# Patient Record
Sex: MALE | Race: WHITE | NOT HISPANIC OR LATINO
[De-identification: names, ages, dates, MRNs, and addresses within clinical notes are randomized per-mention and may not be internally consistent; named-entity substitution may affect disease eponyms.]

---

## 2017-01-19 ENCOUNTER — TRANSCRIPTION ENCOUNTER (OUTPATIENT)
Age: 77
End: 2017-01-19

## 2018-07-31 ENCOUNTER — APPOINTMENT (OUTPATIENT)
Dept: ANESTHESIOLOGY | Facility: CLINIC | Age: 78
End: 2018-07-31

## 2018-07-31 ENCOUNTER — OUTPATIENT (OUTPATIENT)
Dept: OUTPATIENT SERVICES | Facility: HOSPITAL | Age: 78
LOS: 1 days | End: 2018-07-31
Payer: MEDICARE

## 2018-07-31 DIAGNOSIS — M54.17 RADICULOPATHY, LUMBOSACRAL REGION: ICD-10-CM

## 2018-07-31 DIAGNOSIS — Z98.890 OTHER SPECIFIED POSTPROCEDURAL STATES: Chronic | ICD-10-CM

## 2018-07-31 DIAGNOSIS — M54.16 RADICULOPATHY, LUMBAR REGION: ICD-10-CM

## 2018-07-31 DIAGNOSIS — Z90.79 ACQUIRED ABSENCE OF OTHER GENITAL ORGAN(S): Chronic | ICD-10-CM

## 2018-07-31 PROCEDURE — 64484 NJX AA&/STRD TFRM EPI L/S EA: CPT

## 2018-07-31 PROCEDURE — 64483 NJX AA&/STRD TFRM EPI L/S 1: CPT

## 2019-05-13 ENCOUNTER — APPOINTMENT (OUTPATIENT)
Dept: ORTHOPEDIC SURGERY | Facility: CLINIC | Age: 79
End: 2019-05-13
Payer: MEDICARE

## 2019-05-13 VITALS
BODY MASS INDEX: 29.73 KG/M2 | SYSTOLIC BLOOD PRESSURE: 171 MMHG | HEIGHT: 66 IN | HEART RATE: 62 BPM | DIASTOLIC BLOOD PRESSURE: 79 MMHG | WEIGHT: 185 LBS

## 2019-05-13 DIAGNOSIS — Z78.9 OTHER SPECIFIED HEALTH STATUS: ICD-10-CM

## 2019-05-13 PROCEDURE — 99214 OFFICE O/P EST MOD 30 MIN: CPT

## 2019-05-13 NOTE — PHYSICAL EXAM
[de-identified] : The patient is noted to have functional stable range of motion to the lumbar spine with forward flexion at 70°. Negative straight leg raise.\par \par However the patient is noted to have a radicular pain element running down the right leg with associated diminished sensation and weakness to the right lower extremity.\par \par Deep tendon reflexes at the knee on the left side is 2+ and on the right side is 1+. [de-identified] : repeat x-rays of the lumbosacral spine were deferred at this time.

## 2019-05-13 NOTE — ASSESSMENT
[FreeTextEntry1] : Since the patient is having renewed back pain with right-sided radiculopathy as before, he was advised of following up with his pain management specialist for consideration of further spinal epidural actions.\par \par The patient is advised to return to our office in 6-8 weeks to check his progress.

## 2019-05-13 NOTE — HISTORY OF PRESENT ILLNESS
[Pain Location] : pain [___ yrs] : [unfilled] year(s) ago [Stable] : stable [Bending] : worsened by bending [de-identified] : Pt presents with  pain to his left back  lifting heavy object.. Patient is pointing to his  back, took Tylenol extra strength. He has seen Dr Herman for SEIT injections  saw him over a year ago.

## 2020-08-26 ENCOUNTER — INPATIENT (INPATIENT)
Facility: HOSPITAL | Age: 80
LOS: 8 days | Discharge: HOME | DRG: 853 | End: 2020-09-04
Attending: INTERNAL MEDICINE | Admitting: INTERNAL MEDICINE
Payer: MEDICARE

## 2020-08-26 VITALS
DIASTOLIC BLOOD PRESSURE: 63 MMHG | HEIGHT: 70 IN | OXYGEN SATURATION: 99 % | SYSTOLIC BLOOD PRESSURE: 127 MMHG | TEMPERATURE: 99 F | RESPIRATION RATE: 18 BRPM | HEART RATE: 122 BPM | WEIGHT: 220.02 LBS

## 2020-08-26 DIAGNOSIS — N18.3 CHRONIC KIDNEY DISEASE, STAGE 3 (MODERATE): ICD-10-CM

## 2020-08-26 DIAGNOSIS — M79.605 PAIN IN LEFT LEG: ICD-10-CM

## 2020-08-26 DIAGNOSIS — A41.9 SEPSIS, UNSPECIFIED ORGANISM: ICD-10-CM

## 2020-08-26 DIAGNOSIS — M10.9 GOUT, UNSPECIFIED: ICD-10-CM

## 2020-08-26 DIAGNOSIS — D63.8 ANEMIA IN OTHER CHRONIC DISEASES CLASSIFIED ELSEWHERE: ICD-10-CM

## 2020-08-26 DIAGNOSIS — E78.00 PURE HYPERCHOLESTEROLEMIA, UNSPECIFIED: ICD-10-CM

## 2020-08-26 DIAGNOSIS — K80.20 CALCULUS OF GALLBLADDER WITHOUT CHOLECYSTITIS WITHOUT OBSTRUCTION: ICD-10-CM

## 2020-08-26 DIAGNOSIS — I10 ESSENTIAL (PRIMARY) HYPERTENSION: ICD-10-CM

## 2020-08-26 DIAGNOSIS — J18.9 PNEUMONIA, UNSPECIFIED ORGANISM: ICD-10-CM

## 2020-08-26 DIAGNOSIS — Z98.890 OTHER SPECIFIED POSTPROCEDURAL STATES: Chronic | ICD-10-CM

## 2020-08-26 DIAGNOSIS — Z90.79 ACQUIRED ABSENCE OF OTHER GENITAL ORGAN(S): Chronic | ICD-10-CM

## 2020-08-26 LAB
ALBUMIN SERPL ELPH-MCNC: 3.7 G/DL — SIGNIFICANT CHANGE UP (ref 3.3–5)
ALP SERPL-CCNC: 70 U/L — SIGNIFICANT CHANGE UP (ref 40–120)
ALT FLD-CCNC: 53 U/L — HIGH (ref 10–45)
ANION GAP SERPL CALC-SCNC: 15 MMOL/L — SIGNIFICANT CHANGE UP (ref 5–17)
ANISOCYTOSIS BLD QL: SLIGHT — SIGNIFICANT CHANGE UP
APPEARANCE UR: ABNORMAL
APTT BLD: 27 SEC — LOW (ref 27.5–35.5)
AST SERPL-CCNC: 60 U/L — HIGH (ref 10–40)
BACTERIA # UR AUTO: NEGATIVE — SIGNIFICANT CHANGE UP
BASOPHILS # BLD AUTO: 0 K/UL — SIGNIFICANT CHANGE UP (ref 0–0.2)
BASOPHILS NFR BLD AUTO: 0 % — SIGNIFICANT CHANGE UP (ref 0–2)
BILIRUB SERPL-MCNC: 0.8 MG/DL — SIGNIFICANT CHANGE UP (ref 0.2–1.2)
BILIRUB UR-MCNC: NEGATIVE — SIGNIFICANT CHANGE UP
BUN SERPL-MCNC: 44 MG/DL — HIGH (ref 7–23)
CALCIUM SERPL-MCNC: 8.5 MG/DL — SIGNIFICANT CHANGE UP (ref 8.4–10.5)
CHLORIDE SERPL-SCNC: 102 MMOL/L — SIGNIFICANT CHANGE UP (ref 96–108)
CO2 SERPL-SCNC: 19 MMOL/L — LOW (ref 22–31)
COLOR SPEC: YELLOW — SIGNIFICANT CHANGE UP
CREAT SERPL-MCNC: 2.61 MG/DL — HIGH (ref 0.5–1.3)
DACRYOCYTES BLD QL SMEAR: SLIGHT — SIGNIFICANT CHANGE UP
DIFF PNL FLD: ABNORMAL
ELLIPTOCYTES BLD QL SMEAR: SLIGHT — SIGNIFICANT CHANGE UP
EOSINOPHIL # BLD AUTO: 0.23 K/UL — SIGNIFICANT CHANGE UP (ref 0–0.5)
EOSINOPHIL NFR BLD AUTO: 3 % — SIGNIFICANT CHANGE UP (ref 0–6)
EPI CELLS # UR: 5 — SIGNIFICANT CHANGE UP
GLUCOSE SERPL-MCNC: 217 MG/DL — HIGH (ref 70–99)
GLUCOSE UR QL: NEGATIVE — SIGNIFICANT CHANGE UP
HCT VFR BLD CALC: 33.1 % — LOW (ref 39–50)
HGB BLD-MCNC: 11 G/DL — LOW (ref 13–17)
HYALINE CASTS # UR AUTO: 2 /LPF — SIGNIFICANT CHANGE UP (ref 0–7)
INR BLD: 1.47 RATIO — HIGH (ref 0.88–1.16)
KETONES UR-MCNC: NEGATIVE — SIGNIFICANT CHANGE UP
LACTATE BLDV-MCNC: 1.8 MMOL/L — SIGNIFICANT CHANGE UP (ref 0.7–2)
LEUKOCYTE ESTERASE UR-ACNC: NEGATIVE — SIGNIFICANT CHANGE UP
LYMPHOCYTES # BLD AUTO: 0.08 K/UL — LOW (ref 1–3.3)
LYMPHOCYTES # BLD AUTO: 1 % — LOW (ref 13–44)
MACROCYTES BLD QL: SIGNIFICANT CHANGE UP
MANUAL SMEAR VERIFICATION: SIGNIFICANT CHANGE UP
MCHC RBC-ENTMCNC: 33.2 GM/DL — SIGNIFICANT CHANGE UP (ref 32–36)
MCHC RBC-ENTMCNC: 35.9 PG — HIGH (ref 27–34)
MCV RBC AUTO: 108.2 FL — HIGH (ref 80–100)
MICROCYTES BLD QL: SLIGHT — SIGNIFICANT CHANGE UP
MONOCYTES # BLD AUTO: 0.23 K/UL — SIGNIFICANT CHANGE UP (ref 0–0.9)
MONOCYTES NFR BLD AUTO: 3 % — SIGNIFICANT CHANGE UP (ref 2–14)
NEUTROPHILS # BLD AUTO: 7.02 K/UL — SIGNIFICANT CHANGE UP (ref 1.8–7.4)
NEUTROPHILS NFR BLD AUTO: 86 % — HIGH (ref 43–77)
NEUTS BAND # BLD: 4 % — SIGNIFICANT CHANGE UP (ref 0–8)
NITRITE UR-MCNC: NEGATIVE — SIGNIFICANT CHANGE UP
NRBC # BLD: 0 /100 — SIGNIFICANT CHANGE UP (ref 0–0)
PH UR: 6 — SIGNIFICANT CHANGE UP (ref 5–8)
PLAT MORPH BLD: NORMAL — SIGNIFICANT CHANGE UP
PLATELET # BLD AUTO: 118 K/UL — LOW (ref 150–400)
POLYCHROMASIA BLD QL SMEAR: SLIGHT — SIGNIFICANT CHANGE UP
POTASSIUM SERPL-MCNC: 4 MMOL/L — SIGNIFICANT CHANGE UP (ref 3.5–5.3)
POTASSIUM SERPL-SCNC: 4 MMOL/L — SIGNIFICANT CHANGE UP (ref 3.5–5.3)
PROT SERPL-MCNC: 6.6 G/DL — SIGNIFICANT CHANGE UP (ref 6–8.3)
PROT UR-MCNC: ABNORMAL
PROTHROM AB SERPL-ACNC: 17.2 SEC — HIGH (ref 10.6–13.6)
RBC # BLD: 3.06 M/UL — LOW (ref 4.2–5.8)
RBC # FLD: 17.2 % — HIGH (ref 10.3–14.5)
RBC BLD AUTO: ABNORMAL
RBC CASTS # UR COMP ASSIST: 2 /HPF — SIGNIFICANT CHANGE UP (ref 0–4)
SARS-COV-2 RNA SPEC QL NAA+PROBE: SIGNIFICANT CHANGE UP
SODIUM SERPL-SCNC: 136 MMOL/L — SIGNIFICANT CHANGE UP (ref 135–145)
SP GR SPEC: 1.02 — SIGNIFICANT CHANGE UP (ref 1.01–1.02)
UROBILINOGEN FLD QL: NEGATIVE — SIGNIFICANT CHANGE UP
VARIANT LYMPHS # BLD: 3 % — SIGNIFICANT CHANGE UP (ref 0–6)
WBC # BLD: 7.8 K/UL — SIGNIFICANT CHANGE UP (ref 3.8–10.5)
WBC # FLD AUTO: 7.8 K/UL — SIGNIFICANT CHANGE UP (ref 3.8–10.5)
WBC UR QL: 5 /HPF — SIGNIFICANT CHANGE UP (ref 0–5)

## 2020-08-26 PROCEDURE — 93010 ELECTROCARDIOGRAM REPORT: CPT

## 2020-08-26 PROCEDURE — 99285 EMERGENCY DEPT VISIT HI MDM: CPT

## 2020-08-26 PROCEDURE — 74176 CT ABD & PELVIS W/O CONTRAST: CPT | Mod: 26

## 2020-08-26 PROCEDURE — 71045 X-RAY EXAM CHEST 1 VIEW: CPT | Mod: 26

## 2020-08-26 PROCEDURE — 71250 CT THORAX DX C-: CPT | Mod: 26

## 2020-08-26 PROCEDURE — 99222 1ST HOSP IP/OBS MODERATE 55: CPT

## 2020-08-26 RX ORDER — SODIUM CHLORIDE 9 MG/ML
1000 INJECTION INTRAMUSCULAR; INTRAVENOUS; SUBCUTANEOUS ONCE
Refills: 0 | Status: COMPLETED | OUTPATIENT
Start: 2020-08-26 | End: 2020-08-26

## 2020-08-26 RX ORDER — PIPERACILLIN AND TAZOBACTAM 4; .5 G/20ML; G/20ML
3.38 INJECTION, POWDER, LYOPHILIZED, FOR SOLUTION INTRAVENOUS ONCE
Refills: 0 | Status: COMPLETED | OUTPATIENT
Start: 2020-08-26 | End: 2020-08-26

## 2020-08-26 RX ORDER — LOSARTAN POTASSIUM 100 MG/1
100 TABLET, FILM COATED ORAL DAILY
Refills: 0 | Status: DISCONTINUED | OUTPATIENT
Start: 2020-08-26 | End: 2020-08-27

## 2020-08-26 RX ORDER — HEPARIN SODIUM 5000 [USP'U]/ML
5000 INJECTION INTRAVENOUS; SUBCUTANEOUS EVERY 12 HOURS
Refills: 0 | Status: DISCONTINUED | OUTPATIENT
Start: 2020-08-26 | End: 2020-09-04

## 2020-08-26 RX ORDER — AZITHROMYCIN 500 MG/1
500 TABLET, FILM COATED ORAL ONCE
Refills: 0 | Status: COMPLETED | OUTPATIENT
Start: 2020-08-26 | End: 2020-08-26

## 2020-08-26 RX ORDER — VANCOMYCIN HCL 1 G
1000 VIAL (EA) INTRAVENOUS ONCE
Refills: 0 | Status: COMPLETED | OUTPATIENT
Start: 2020-08-26 | End: 2020-08-26

## 2020-08-26 RX ORDER — ATORVASTATIN CALCIUM 80 MG/1
20 TABLET, FILM COATED ORAL AT BEDTIME
Refills: 0 | Status: DISCONTINUED | OUTPATIENT
Start: 2020-08-26 | End: 2020-08-30

## 2020-08-26 RX ORDER — CARVEDILOL PHOSPHATE 80 MG/1
6.25 CAPSULE, EXTENDED RELEASE ORAL EVERY 12 HOURS
Refills: 0 | Status: DISCONTINUED | OUTPATIENT
Start: 2020-08-26 | End: 2020-08-27

## 2020-08-26 RX ORDER — ASPIRIN AND DIPYRIDAMOLE 25; 200 MG/1; MG/1
1 CAPSULE, EXTENDED RELEASE ORAL
Refills: 0 | Status: DISCONTINUED | OUTPATIENT
Start: 2020-08-26 | End: 2020-09-01

## 2020-08-26 RX ORDER — CEFTRIAXONE 500 MG/1
1000 INJECTION, POWDER, FOR SOLUTION INTRAMUSCULAR; INTRAVENOUS ONCE
Refills: 0 | Status: COMPLETED | OUTPATIENT
Start: 2020-08-26 | End: 2020-08-26

## 2020-08-26 RX ORDER — CEFTRIAXONE 500 MG/1
1000 INJECTION, POWDER, FOR SOLUTION INTRAMUSCULAR; INTRAVENOUS EVERY 24 HOURS
Refills: 0 | Status: DISCONTINUED | OUTPATIENT
Start: 2020-08-26 | End: 2020-08-28

## 2020-08-26 RX ORDER — CALCITRIOL 0.5 UG/1
0.25 CAPSULE ORAL DAILY
Refills: 0 | Status: DISCONTINUED | OUTPATIENT
Start: 2020-08-26 | End: 2020-09-04

## 2020-08-26 RX ORDER — AZITHROMYCIN 500 MG/1
TABLET, FILM COATED ORAL
Refills: 0 | Status: DISCONTINUED | OUTPATIENT
Start: 2020-08-26 | End: 2020-08-28

## 2020-08-26 RX ORDER — AZITHROMYCIN 500 MG/1
500 TABLET, FILM COATED ORAL EVERY 24 HOURS
Refills: 0 | Status: DISCONTINUED | OUTPATIENT
Start: 2020-08-27 | End: 2020-08-28

## 2020-08-26 RX ORDER — COLCHICINE 0.6 MG
0.6 TABLET ORAL DAILY
Refills: 0 | Status: DISCONTINUED | OUTPATIENT
Start: 2020-08-26 | End: 2020-08-27

## 2020-08-26 RX ADMIN — AZITHROMYCIN 250 MILLIGRAM(S): 500 TABLET, FILM COATED ORAL at 22:46

## 2020-08-26 RX ADMIN — Medication 250 MILLIGRAM(S): at 20:19

## 2020-08-26 RX ADMIN — PIPERACILLIN AND TAZOBACTAM 200 GRAM(S): 4; .5 INJECTION, POWDER, LYOPHILIZED, FOR SOLUTION INTRAVENOUS at 19:19

## 2020-08-26 RX ADMIN — SODIUM CHLORIDE 1000 MILLILITER(S): 9 INJECTION INTRAMUSCULAR; INTRAVENOUS; SUBCUTANEOUS at 17:57

## 2020-08-26 RX ADMIN — ASPIRIN AND DIPYRIDAMOLE 1 CAPSULE(S): 25; 200 CAPSULE, EXTENDED RELEASE ORAL at 22:30

## 2020-08-26 RX ADMIN — CEFTRIAXONE 100 MILLIGRAM(S): 500 INJECTION, POWDER, FOR SOLUTION INTRAMUSCULAR; INTRAVENOUS at 17:54

## 2020-08-26 RX ADMIN — SODIUM CHLORIDE 1000 MILLILITER(S): 9 INJECTION INTRAMUSCULAR; INTRAVENOUS; SUBCUTANEOUS at 16:54

## 2020-08-26 RX ADMIN — ATORVASTATIN CALCIUM 20 MILLIGRAM(S): 80 TABLET, FILM COATED ORAL at 22:30

## 2020-08-26 NOTE — ED ADULT TRIAGE NOTE - CHIEF COMPLAINT QUOTE
Fever Poss urosepsis  Foul smelling urine Fever Poss urosepsis  Foul smelling urine Frequent urination Bilat leg redness

## 2020-08-26 NOTE — ED PROVIDER NOTE - PMH
Gout    HTN - Hypertension    Hypercholesterolemia    PC (prostate cancer)  s/p surgical resection 3 years ago

## 2020-08-26 NOTE — ED PROVIDER NOTE - OBJECTIVE STATEMENT
80yom pmhx of HTN HLD prostate CA bib ems for weakness and fever for past 2 days. unable to get off the couch since yesterday. No chest pain no sob, no nausea or vomiting. +incontinence of urine; seen by PMD this week for arthritis and started on Tramadol.

## 2020-08-26 NOTE — ED ADULT NURSE NOTE - OBJECTIVE STATEMENT
79 yo male presents to ED via EMS from home for fever, weakness x2 days. Per EMS, patient has had increasing weakness and fever tmax 103 at home for the past day. Upon arrival at home, EMS states patient was found with urine stained clothing. EMS states patient received 975 Tylenol approximately 1 hours prior to arrival. Upon arrival, patient is sweaty, airway patent, breathing spontaneously, bl clear lungs, abdomen nontender, +pulses, cap refill <2 seconds. Patient has small wound to L lower calf. Patient denies SOB, CP, nvd, sick contacts, cough, falls/loc. Patient resting in bed, side rails up, plan of care explained.  Cardiac monitor in place. Code Sepsis initiated. MD Rodriguez at bedside.

## 2020-08-26 NOTE — H&P ADULT - NEGATIVE CARDIOVASCULAR SYMPTOMS
no orthopnea/no peripheral edema/no chest pain/no palpitations/no paroxysmal nocturnal dyspnea/no dyspnea on exertion

## 2020-08-26 NOTE — ED PROVIDER NOTE - PHYSICAL EXAMINATION
Gen: AAO x 3, uncomfortable, unkept   Skin: No rashes or lesions  HEENT: NC/AT, PERRLA, EOMI, MMM  Resp: unlabored CTAB  Cardiac: tachy s1s2   GI: ND, +BS, Soft, NT  Ext: no pedal edema, FROM in all extremities; b/l shin skin changes and mild redness, No warmth.   Neuro: no focal deficits

## 2020-08-26 NOTE — ED PROVIDER NOTE - SHIFT CHANGE DETAILS
Attending MD Segura: Concern for urosepsis, ?renal dysfxn, abx'ed Ceftriaxone, cx, IVFs, will need admission

## 2020-08-26 NOTE — ED PROVIDER NOTE - PROGRESS NOTE DETAILS
Attending MD Segura: Labs noted, CXR noted, no clear etiology, will obtain CTCAP, discussed with Dr. Wiggins, will admit to his service.  No acute pathology on prelim read of CTs.

## 2020-08-26 NOTE — H&P ADULT - HISTORY OF PRESENT ILLNESS
80yom pmhx of HTN HLD prostate CA bib ems for weakness and fever for past 2 days. unable to get off the couch since yesterday. No chest pain no sob, no nausea or vomiting. +incontinence of urine; seen by PMD this week for arthritis and started on Tramadol. 80yom pmhx of HTN , HLD  ,prostate CA , PVD bib ems for weakness and fever for past 2 days. Unable to get off the couch since yesterday. No chest pain no sob, no nausea or vomiting. +incontinence of urine; seen by PMD this week for arthritis and started on Tramadol.

## 2020-08-26 NOTE — ED ADULT NURSE REASSESSMENT NOTE - NS ED NURSE REASSESS COMMENT FT1
Patient straight cathed for sterile urine sample with 2nd RN at bedside. Patient tolerated well, 100cc of dark yellow urine obtained. UA/UC sent to lab.

## 2020-08-26 NOTE — ED PROVIDER NOTE - ATTENDING CONTRIBUTION TO CARE
81 y/o m with pmhx gout, HTN, HLD,  prostate ca, presents for eval of fever. patient arrived by EMS who found him at home sitting on sofa, for the last 24 hrs. daughter called EMS for him concerned of infection. patient denies cough. no urinary symptoms. no  pain. no vomiting no diarrhea. no chest pain. no recent covid exp or hx pos test. HR fast by ems but improved with ivf.    Gen.  elderly male. no acute distress  HEENT:  dry mm  Lungs:  b/l bs  CVS: S1S2   Abd;  soft non tender no distention  Ext: no erythema  Neuro: aaox3 clear speech  MSK: moving all ext spon

## 2020-08-26 NOTE — ED ADULT NURSE NOTE - NSIMPLEMENTINTERV_GEN_ALL_ED
Implemented All Fall Risk Interventions:  [place] to call system. Call bell, personal items and telephone within reach. Instruct patient to call for assistance. Room bathroom lighting operational. Non-slip footwear when patient is off stretcher. Physically safe environment: no spills, clutter or unnecessary equipment. Stretcher in lowest position, wheels locked, appropriate side rails in place. Provide visual cue, wrist band, yellow gown, etc. Monitor gait and stability. Monitor for mental status changes and reorient to person, place, and time. Review medications for side effects contributing to fall risk. Reinforce activity limits and safety measures with patient and family.

## 2020-08-26 NOTE — H&P ADULT - ASSESSMENT
80yom pmhx of HTN HLD prostate CA bib ems for weakness and fever for past 2 days. unable to get off the couch since yesterday. No chest pain no sob, no nausea or vomiting. +incontinence of urine; seen by PMD this week for arthritis and started on Tramadol. 80yom pmhx of HTN HLD ,PVD prostate CA bib ems for weakness and fever for past 2 days. unable to get off the couch since yesterday. No chest pain no sob, no nausea or vomiting. +incontinence of urine; seen by PMD this week for arthritis and started on Tramadol.

## 2020-08-26 NOTE — H&P ADULT - NSICDXPASTMEDICALHX_GEN_ALL_CORE_FT
PAST MEDICAL HISTORY:  Gout     HTN - Hypertension     Hypercholesterolemia     PC (prostate cancer) s/p surgical resection 3 years ago

## 2020-08-26 NOTE — ED PROVIDER NOTE - NS ED ROS FT
Constitutional: +fever +chills  Eyes: No visual changes, eye pain or redness  HEENT: No throat pain, ear pain, nasal pain. No nose bleeding.  CV: No chest pain or lower extremity edema  Resp: No SOB no cough  GI: No abd pain. No nausea or vomiting. No diarrhea. No constipation.   : No dysuria, hematuria.   MSK: No musculoskeletal pain +weakness   Skin: No rash  Neuro: No headache. No numbness or tingling. No weakness.

## 2020-08-26 NOTE — ED PROVIDER NOTE - CLINICAL SUMMARY MEDICAL DECISION MAKING FREE TEXT BOX
ATTG: : fever concerns include pna / uti. check labs, check urinalysis, check xray chest, re eval for dispo

## 2020-08-26 NOTE — ED ADULT NURSE REASSESSMENT NOTE - NS ED NURSE REASSESS COMMENT FT1
Report received from PARIS Connell in purple. Pt AxOx3, observed sitting up in stretcher conversing with RN without difficulty. Breathing spontaneous and unlabored with pulse ox >95% on room air. Pt updated on plan of care awaiting bed assignment. Upon assessment, abdomen soft and nontender, +strong peripheral pulses, moving all extremities without difficulty, lungs clear, following commands with equal strength in all extremities. Pt repositioned and provided clean linen for comfort. No acute distress noted. Call bell within reach.

## 2020-08-27 DIAGNOSIS — I70.219 ATHEROSCLEROSIS OF NATIVE ARTERIES OF EXTREMITIES WITH INTERMITTENT CLAUDICATION, UNSPECIFIED EXTREMITY: ICD-10-CM

## 2020-08-27 DIAGNOSIS — I73.9 PERIPHERAL VASCULAR DISEASE, UNSPECIFIED: ICD-10-CM

## 2020-08-27 DIAGNOSIS — N18.9 CHRONIC KIDNEY DISEASE, UNSPECIFIED: ICD-10-CM

## 2020-08-27 LAB
ALBUMIN SERPL ELPH-MCNC: 3.4 G/DL — SIGNIFICANT CHANGE UP (ref 3.3–5)
ALP SERPL-CCNC: 68 U/L — SIGNIFICANT CHANGE UP (ref 40–120)
ALT FLD-CCNC: 63 U/L — HIGH (ref 10–45)
ANION GAP SERPL CALC-SCNC: 15 MMOL/L — SIGNIFICANT CHANGE UP (ref 5–17)
AST SERPL-CCNC: 64 U/L — HIGH (ref 10–40)
BILIRUB SERPL-MCNC: 0.7 MG/DL — SIGNIFICANT CHANGE UP (ref 0.2–1.2)
BUN SERPL-MCNC: 47 MG/DL — HIGH (ref 7–23)
CALCIUM SERPL-MCNC: 8.3 MG/DL — LOW (ref 8.4–10.5)
CHLORIDE SERPL-SCNC: 102 MMOL/L — SIGNIFICANT CHANGE UP (ref 96–108)
CO2 SERPL-SCNC: 19 MMOL/L — LOW (ref 22–31)
CREAT SERPL-MCNC: 3 MG/DL — HIGH (ref 0.5–1.3)
CULTURE RESULTS: NO GROWTH — SIGNIFICANT CHANGE UP
GLUCOSE SERPL-MCNC: 120 MG/DL — HIGH (ref 70–99)
HCT VFR BLD CALC: 32.6 % — LOW (ref 39–50)
HGB BLD-MCNC: 10.8 G/DL — LOW (ref 13–17)
MCHC RBC-ENTMCNC: 33.1 GM/DL — SIGNIFICANT CHANGE UP (ref 32–36)
MCHC RBC-ENTMCNC: 36.1 PG — HIGH (ref 27–34)
MCV RBC AUTO: 109 FL — HIGH (ref 80–100)
NRBC # BLD: 0 /100 WBCS — SIGNIFICANT CHANGE UP (ref 0–0)
PLATELET # BLD AUTO: 106 K/UL — LOW (ref 150–400)
POTASSIUM SERPL-MCNC: 4 MMOL/L — SIGNIFICANT CHANGE UP (ref 3.5–5.3)
POTASSIUM SERPL-SCNC: 4 MMOL/L — SIGNIFICANT CHANGE UP (ref 3.5–5.3)
PROT SERPL-MCNC: 6.3 G/DL — SIGNIFICANT CHANGE UP (ref 6–8.3)
RBC # BLD: 2.99 M/UL — LOW (ref 4.2–5.8)
RBC # FLD: 17.5 % — HIGH (ref 10.3–14.5)
SODIUM SERPL-SCNC: 136 MMOL/L — SIGNIFICANT CHANGE UP (ref 135–145)
SPECIMEN SOURCE: SIGNIFICANT CHANGE UP
WBC # BLD: 5.92 K/UL — SIGNIFICANT CHANGE UP (ref 3.8–10.5)
WBC # FLD AUTO: 5.92 K/UL — SIGNIFICANT CHANGE UP (ref 3.8–10.5)

## 2020-08-27 PROCEDURE — 93971 EXTREMITY STUDY: CPT | Mod: 26

## 2020-08-27 PROCEDURE — 99232 SBSQ HOSP IP/OBS MODERATE 35: CPT

## 2020-08-27 PROCEDURE — 93923 UPR/LXTR ART STDY 3+ LVLS: CPT | Mod: 26

## 2020-08-27 PROCEDURE — 99223 1ST HOSP IP/OBS HIGH 75: CPT

## 2020-08-27 RX ORDER — SODIUM CHLORIDE 9 MG/ML
1000 INJECTION, SOLUTION INTRAVENOUS
Refills: 0 | Status: DISCONTINUED | OUTPATIENT
Start: 2020-08-27 | End: 2020-08-28

## 2020-08-27 RX ORDER — COLCHICINE 0.6 MG
0.3 TABLET ORAL DAILY
Refills: 0 | Status: DISCONTINUED | OUTPATIENT
Start: 2020-08-27 | End: 2020-09-04

## 2020-08-27 RX ORDER — ACETAMINOPHEN 500 MG
650 TABLET ORAL EVERY 6 HOURS
Refills: 0 | Status: DISCONTINUED | OUTPATIENT
Start: 2020-08-27 | End: 2020-09-04

## 2020-08-27 RX ORDER — ACETAMINOPHEN 500 MG
1000 TABLET ORAL ONCE
Refills: 0 | Status: COMPLETED | OUTPATIENT
Start: 2020-08-27 | End: 2020-08-27

## 2020-08-27 RX ORDER — CARVEDILOL PHOSPHATE 80 MG/1
6.25 CAPSULE, EXTENDED RELEASE ORAL EVERY 12 HOURS
Refills: 0 | Status: DISCONTINUED | OUTPATIENT
Start: 2020-08-27 | End: 2020-08-28

## 2020-08-27 RX ADMIN — ATORVASTATIN CALCIUM 20 MILLIGRAM(S): 80 TABLET, FILM COATED ORAL at 21:28

## 2020-08-27 RX ADMIN — Medication 650 MILLIGRAM(S): at 17:35

## 2020-08-27 RX ADMIN — SODIUM CHLORIDE 50 MILLILITER(S): 9 INJECTION, SOLUTION INTRAVENOUS at 18:02

## 2020-08-27 RX ADMIN — LOSARTAN POTASSIUM 100 MILLIGRAM(S): 100 TABLET, FILM COATED ORAL at 05:13

## 2020-08-27 RX ADMIN — CARVEDILOL PHOSPHATE 6.25 MILLIGRAM(S): 80 CAPSULE, EXTENDED RELEASE ORAL at 17:11

## 2020-08-27 RX ADMIN — Medication 0.3 MILLIGRAM(S): at 11:47

## 2020-08-27 RX ADMIN — CARVEDILOL PHOSPHATE 6.25 MILLIGRAM(S): 80 CAPSULE, EXTENDED RELEASE ORAL at 05:13

## 2020-08-27 RX ADMIN — HEPARIN SODIUM 5000 UNIT(S): 5000 INJECTION INTRAVENOUS; SUBCUTANEOUS at 17:12

## 2020-08-27 RX ADMIN — CEFTRIAXONE 100 MILLIGRAM(S): 500 INJECTION, POWDER, FOR SOLUTION INTRAMUSCULAR; INTRAVENOUS at 16:58

## 2020-08-27 RX ADMIN — ASPIRIN AND DIPYRIDAMOLE 1 CAPSULE(S): 25; 200 CAPSULE, EXTENDED RELEASE ORAL at 17:12

## 2020-08-27 RX ADMIN — ASPIRIN AND DIPYRIDAMOLE 1 CAPSULE(S): 25; 200 CAPSULE, EXTENDED RELEASE ORAL at 05:13

## 2020-08-27 RX ADMIN — Medication 650 MILLIGRAM(S): at 17:11

## 2020-08-27 RX ADMIN — CALCITRIOL 0.25 MICROGRAM(S): 0.5 CAPSULE ORAL at 11:48

## 2020-08-27 RX ADMIN — Medication 400 MILLIGRAM(S): at 05:12

## 2020-08-27 RX ADMIN — HEPARIN SODIUM 5000 UNIT(S): 5000 INJECTION INTRAVENOUS; SUBCUTANEOUS at 05:13

## 2020-08-27 RX ADMIN — AZITHROMYCIN 250 MILLIGRAM(S): 500 TABLET, FILM COATED ORAL at 21:28

## 2020-08-27 NOTE — CONSULT NOTE ADULT - ASSESSMENT
80 year old with history of prostate ca, UTIs, prostatectomy, presented with weakness, leg pain which seems to have resolved, not walking well, and currently complains only of left flank pain  Denies SOB, fever, chills     CT scan reviewed  suggested right lower pna but clinically presentation is not really suggestive of pna.  He has left flank pain but UA is only modestly abnormal and ct scan does not demonstrate obstruction.     It is not clear what is the problem ( pyelo, pna, or PVD).    check legionella ag.  change ceftriaxone to cefepime to cover gram negatives in urine better.   dc Zithromax    await urine and bc.  attempt to walk ; no weakness on direct exam but he has not walked   ID to cover next 3 days . 80 year old with history of prostate ca, UTIs, prostatectomy, presented with weakness, leg pain which seems to have resolved, not walking well, and currently complains only of left flank pain  Denies SOB, fever, chills     CT scan reviewed  suggested right lower pna but clinically presentation is not really suggestive of pna.  He has left flank pain but UA is only modestly abnormal and ct scan does not demonstrate obstruction.     It is not clear what is the problem ( pyelo, pna, or PVD).    check legionella ag.  change ceftriaxone to cefepime to cover gram negatives in urine better.   dc Zithromax if legionella is negative     await urine and bc.  attempt to walk ; no weakness on direct exam but he has not walked   ID to cover next 3 days .

## 2020-08-27 NOTE — CONSULT NOTE ADULT - PROBLEM SELECTOR RECOMMENDATION 2
-Vascular f/u
I Agus Alarcon MD performed a history and physical exam of the patient and discussed  the findings and plan with the house officer. I reviewed the resident note and agree with the findings and plan

## 2020-08-27 NOTE — CONSULT NOTE ADULT - ASSESSMENT
81 y/o M with PMH of HTN, HLD, prostate CA, PVD. Presents to ED with weakness, L leg pain and fever for past 2 days. Unable to get off the couch since yesterday. Seen by PMD this week for arthritis and started on Tramadol. Found to have LLL consolidation concerning for PNA.

## 2020-08-27 NOTE — PROGRESS NOTE ADULT - SUBJECTIVE AND OBJECTIVE BOX
INTERVAL HPI/OVERNIGHT EVENTS: I feel much better.   Vital Signs Last 24 Hrs  T(C): 36.8 (27 Aug 2020 11:15), Max: 39.3 (27 Aug 2020 04:59)  T(F): 98.2 (27 Aug 2020 11:15), Max: 102.8 (27 Aug 2020 04:59)  HR: 82 (27 Aug 2020 11:15) (74 - 122)  BP: 107/63 (27 Aug 2020 11:15) (95/57 - 147/77)  BP(mean): 92 (26 Aug 2020 19:28) (87 - 92)  RR: 16 (27 Aug 2020 11:15) (16 - 23)  SpO2: 95% (27 Aug 2020 11:15) (91% - 99%)  I&O's Summary    26 Aug 2020 07:  -  27 Aug 2020 07:00  --------------------------------------------------------  IN: 340 mL / OUT: 200 mL / NET: 140 mL    27 Aug 2020 07:01  -  27 Aug 2020 13:43  --------------------------------------------------------  IN: 0 mL / OUT: 120 mL / NET: -120 mL      MEDICATIONS  (STANDING):  atorvastatin 20 milliGRAM(s) Oral at bedtime  azithromycin  IVPB      azithromycin  IVPB 500 milliGRAM(s) IV Intermittent every 24 hours  calcitriol   Capsule 0.25 MICROGram(s) Oral daily  carvedilol 6.25 milliGRAM(s) Oral every 12 hours  cefTRIAXone   IVPB 1000 milliGRAM(s) IV Intermittent every 24 hours  colchicine 0.3 milliGRAM(s) Oral daily  dipyridamole 200 mG/aspirin 25 mG 1 Capsule(s) Oral two times a day  heparin   Injectable 5000 Unit(s) SubCutaneous every 12 hours    MEDICATIONS  (PRN):    LABS:                        10.8   5.92  )-----------( 106      ( 27 Aug 2020 07:09 )             32.6     08    136  |  102  |  47<H>  ----------------------------<  120<H>  4.0   |  19<L>  |  3.00<H>    Ca    8.3<L>      27 Aug 2020 07:06    TPro  6.3  /  Alb  3.4  /  TBili  0.7  /  DBili  x   /  AST  64<H>  /  ALT  63<H>  /  AlkPhos  68      PT/INR - ( 26 Aug 2020 17:22 )   PT: 17.2 sec;   INR: 1.47 ratio         PTT - ( 26 Aug 2020 17:22 )  PTT:27.0 sec  Urinalysis Basic - ( 26 Aug 2020 17:22 )    Color: Yellow / Appearance: Slightly Turbid / S.018 / pH: x  Gluc: x / Ketone: Negative  / Bili: Negative / Urobili: Negative   Blood: x / Protein: 300 mg/dL / Nitrite: Negative   Leuk Esterase: Negative / RBC: 2 /hpf / WBC 5 /HPF   Sq Epi: x / Non Sq Epi: 5 / Bacteria: Negative      CAPILLARY BLOOD GLUCOSE            Urinalysis Basic - ( 26 Aug 2020 17:22 )    Color: Yellow / Appearance: Slightly Turbid / S.018 / pH: x  Gluc: x / Ketone: Negative  / Bili: Negative / Urobili: Negative   Blood: x / Protein: 300 mg/dL / Nitrite: Negative   Leuk Esterase: Negative / RBC: 2 /hpf / WBC 5 /HPF   Sq Epi: x / Non Sq Epi: 5 / Bacteria: Negative      REVIEW OF SYSTEMS:  CONSTITUTIONAL: No fever, weight loss, or fatigue  EYES: No eye pain, visual disturbances, or discharge  RESPIRATORY: No cough, wheezing, chills or hemoptysis; No shortness of breath  CARDIOVASCULAR: No chest pain, palpitations, dizziness, or leg swelling  GASTROINTESTINAL: No abdominal or epigastric pain. No nausea, vomiting, or hematemesis; No diarrhea or constipation. No melena or hematochezia.  GENITOURINARY: No dysuria, frequency, hematuria, or incontinence  NEUROLOGICAL: No headaches, memory loss, loss of strength, numbness, or tremors      Consultant(s) Notes Reviewed:  [x ] YES  [ ] NO    PHYSICAL EXAM:  GENERAL: NAD, well-groomed, well-developed ,not in any distress ,  HEAD:  Atraumatic, Normocephalic  EYES: EOMI, PERRLA, conjunctiva and sclera clear  ENMT: No tonsillar erythema, exudates, or enlargement; Moist mucous membranes, Good dentition, No lesions  NECK: Supple, No JVD, Normal thyroid  NERVOUS SYSTEM:  Alert & Oriented X3, No focal deficit   CHEST/LUNG: Good air entry bilateral with no  rales, rhonchi, wheezing, or rubs  HEART: Regular rate and rhythm; No murmurs, rubs, or gallops  ABDOMEN: Soft, Nontender, Nondistended; Bowel sounds present  EXTREMITIES: No clubbing, cyanosis, or edema  SKIN: No rashes or lesions    Care Discussed with Consultants/Other Providers [ x] YES  [ ] NO

## 2020-08-27 NOTE — PROGRESS NOTE ADULT - SUBJECTIVE AND OBJECTIVE BOX
Vascular Progress Note    S: Patient seen and examined. No acute events overnight.  O:  Physical Exam:  Gen: Laying in bed, NAD  HEENT: atrumatic, EMOI  Resp: Unlabored breathing  Abd: soft, NT,ND, no rebound or guarding.   Vascular: Left DP/PT palpable pulses. Warm to palpation. No wounds appreciated.       Vital Signs Last 24 Hrs  T(C): 36.8 (27 Aug 2020 11:15), Max: 39.3 (27 Aug 2020 04:59)  T(F): 98.2 (27 Aug 2020 11:15), Max: 102.8 (27 Aug 2020 04:59)  HR: 82 (27 Aug 2020 11:15) (74 - 122)  BP: 107/63 (27 Aug 2020 11:15) (95/57 - 147/77)  BP(mean): 92 (26 Aug 2020 19:28) (87 - 92)  RR: 16 (27 Aug 2020 11:15) (16 - 23)  SpO2: 95% (27 Aug 2020 11:15) (91% - 99%)    I&O's Detail    26 Aug 2020 07:01  -  27 Aug 2020 07:00  --------------------------------------------------------  IN:    IV PiggyBack: 100 mL    Oral Fluid: 240 mL  Total IN: 340 mL    OUT:    Voided: 200 mL  Total OUT: 200 mL    Total NET: 140 mL      27 Aug 2020 07:01  -  27 Aug 2020 12:33  --------------------------------------------------------  IN:  Total IN: 0 mL    OUT:    Voided: 120 mL  Total OUT: 120 mL    Total NET: -120 mL                                10.8   5.92  )-----------( 106      ( 27 Aug 2020 07:09 )             32.6       08-27    136  |  102  |  47<H>  ----------------------------<  120<H>  4.0   |  19<L>  |  3.00<H>    Ca    8.3<L>      27 Aug 2020 07:06    TPro  6.3  /  Alb  3.4  /  TBili  0.7  /  DBili  x   /  AST  64<H>  /  ALT  63<H>  /  AlkPhos  68  08-27 Vascular Progress Note    S: Patient seen and examined. No acute events overnight.  O:  Physical Exam:  Gen: Laying in bed, NAD  HEENT: atrumatic, EMOI  Resp: Unlabored breathing  Abd: soft, NT,ND, no rebound or guarding.   Vascular: raghu le warm w good perfusion  no acute changes       Vital Signs Last 24 Hrs  T(C): 36.8 (27 Aug 2020 11:15), Max: 39.3 (27 Aug 2020 04:59)  T(F): 98.2 (27 Aug 2020 11:15), Max: 102.8 (27 Aug 2020 04:59)  HR: 82 (27 Aug 2020 11:15) (74 - 122)  BP: 107/63 (27 Aug 2020 11:15) (95/57 - 147/77)  BP(mean): 92 (26 Aug 2020 19:28) (87 - 92)  RR: 16 (27 Aug 2020 11:15) (16 - 23)  SpO2: 95% (27 Aug 2020 11:15) (91% - 99%)    I&O's Detail    26 Aug 2020 07:01  -  27 Aug 2020 07:00  --------------------------------------------------------  IN:    IV PiggyBack: 100 mL    Oral Fluid: 240 mL  Total IN: 340 mL    OUT:    Voided: 200 mL  Total OUT: 200 mL    Total NET: 140 mL      27 Aug 2020 07:01  -  27 Aug 2020 12:33  --------------------------------------------------------  IN:  Total IN: 0 mL    OUT:    Voided: 120 mL  Total OUT: 120 mL    Total NET: -120 mL                            10.8   5.92  )-----------( 106      ( 27 Aug 2020 07:09 )             32.6       08-27    136  |  102  |  47<H>  ----------------------------<  120<H>  4.0   |  19<L>  |  3.00<H>    Ca    8.3<L>      27 Aug 2020 07:06    TPro  6.3  /  Alb  3.4  /  TBili  0.7  /  DBili  x   /  AST  64<H>  /  ALT  63<H>  /  AlkPhos  68  08-27

## 2020-08-27 NOTE — CHART NOTE - NSCHARTNOTEFT_GEN_A_CORE
CC: Fever    HPI: Asked by RN to evaluate patient for fever with oral temperature of . Patient seen and examined at the bedside. Denies rigors, diaphoresis, headaches, dizziness, syncope, visual changes, chest pain, cough, palpitations, SOB, abdominal pain, N/V/D.     Vital Signs Last 24 Hrs  T(C): 39.3 (27 Aug 2020 04:59), Max: 39.3 (27 Aug 2020 04:59)  T(F): 102.8 (27 Aug 2020 04:59), Max: 102.8 (27 Aug 2020 04:59)  HR: 98 (27 Aug 2020 04:59) (84 - 122)  BP: 139/75 (27 Aug 2020 04:59) (117/91 - 147/77)  BP(mean): 92 (26 Aug 2020 19:28) (87 - 92)  RR: 19 (27 Aug 2020 04:59) (18 - 23)  SpO2: 94% (27 Aug 2020 04:59) (94% - 99%)    PHYSICAL EXAM:  General: NAD, A&Ox3  Respiratory: Lungs clear to auscultation bilaterally. No wheezes, rales, rhonchi. Normal respiratory effort.   Cardiovascular: S1, S2 present. Regular rate and rhythm. No murmurs, rubs, or gallops  Gastrointestinal: BS x4 normoactive. Soft, non-tender, non-distended.   Extremities: 2+ peripheral pulses. No edema, cyanosis.    LABS:                        11.0   7.80  )-----------( 118      ( 26 Aug 2020 17:22 )             33.1     08-26    136  |  102  |  44<H>  ----------------------------<  217<H>  4.0   |  19<L>  |  2.61<H>    Ca    8.5      26 Aug 2020 17:22    TPro  6.6  /  Alb  3.7  /  TBili  0.8  /  DBili  x   /  AST  60<H>  /  ALT  53<H>  /  AlkPhos  70  08-26      A/P  HPI:  80yom pmhx of HTN , HLD  ,prostate CA , PVD bib ems for weakness and fever for past 2 days. Unable to get off the couch since yesterday. No chest pain no sob, no nausea or vomiting. +incontinence of urine; seen by PMD this week for arthritis and started on Tramadol. (26 Aug 2020 19:28)    Now, patient with neutropenic fever with an oral temperature of . Patient is currently asymptomatic.     #Fever  -Repeat vitals were stable  -Blood cultures (/): No growth to date   -CXR (/): Clear lungs   -Continue antipyretics/cooling measures    -Continue current antimicrobials  -Continue IVF for hydration  -Discussed with RN  -Will continue to monitor  -Will endorse to AM team      Erica Baer PA-C  #[id number] CC: Fever    HPI: Asked by RN to evaluate patient for fever with oral temperature of 102.8. Patient seen and examined at the bedside. Denies rigors, diaphoresis, headaches, dizziness, syncope, visual changes, chest pain, cough, palpitations, SOB, abdominal pain, N/V/D.     Vital Signs Last 24 Hrs  T(C): 39.3 (27 Aug 2020 04:59), Max: 39.3 (27 Aug 2020 04:59)  T(F): 102.8 (27 Aug 2020 04:59), Max: 102.8 (27 Aug 2020 04:59)  HR: 98 (27 Aug 2020 04:59) (84 - 122)  BP: 139/75 (27 Aug 2020 04:59) (117/91 - 147/77)  BP(mean): 92 (26 Aug 2020 19:28) (87 - 92)  RR: 19 (27 Aug 2020 04:59) (18 - 23)  SpO2: 94% (27 Aug 2020 04:59) (94% - 99%)    PHYSICAL EXAM:  General: NAD, A&Ox3  Respiratory: Lungs clear to auscultation bilaterally. No wheezes, rales, rhonchi. Normal respiratory effort.   Cardiovascular: S1, S2 present. Regular rate and rhythm. No murmurs, rubs, or gallops  Gastrointestinal: BS x4 normoactive. Soft, non-tender, non-distended.   Extremities: 2+ peripheral pulses. No edema, cyanosis.    LABS:                        11.0   7.80  )-----------( 118      ( 26 Aug 2020 17:22 )             33.1     08-26    136  |  102  |  44<H>  ----------------------------<  217<H>  4.0   |  19<L>  |  2.61<H>    Ca    8.5      26 Aug 2020 17:22    TPro  6.6  /  Alb  3.7  /  TBili  0.8  /  DBili  x   /  AST  60<H>  /  ALT  53<H>  /  AlkPhos  70  08-26    RADIOLOGY:  < from: CT Chest, Abdomen, Pelvis No Cont (08.26.20 @ 19:07) >    Left lower lobe pneumonia.    No acute intra-abdominal pathology.    Extensive atherosclerotic changes within the intra-abdominal vasculature.    Diminutive right kidney.    Cholelithiasis.    Follow-up official report in a.m.    < end of copied text >      A/P  81 y/o M with PMHx of HTN, HLD, PVD, CKD III, prostate CA presents with weakness and fever x 2 days.   Now, patient with fever with an oral temperature of 102.8. Patient is currently asymptomatic.     #Fever likely 2/2 LLL PNA  -Repeat vitals were stable  -F/u BCx, UCx  -Pre-howe CT C/A/P (8/26/2020): LLL PNA. Extensive atherosclerotic changes within the intra-abdominal vasculature. Cholelithiasis   -IV Tylenol 1gm x 1 STAT given   -Ice packs given  -Continue current antimicrobials (IV zithro and ceftriaxone)  -Discussed with RN  -Will continue to monitor  -Will endorse to AM team      KOJO ConnerC  #[id number]

## 2020-08-27 NOTE — PROVIDER CONTACT NOTE (OTHER) - ASSESSMENT
Patient manual BP 92/63. Patient asymptomatic. Other VSS. Denies Dizziness, nausea, vomiting, chest pain. Patient alert and oriented x4.

## 2020-08-27 NOTE — CONSULT NOTE ADULT - SUBJECTIVE AND OBJECTIVE BOX
HPI:  Mr. Moya is a 80 year-old man with history of multiple medical issues including hypertension, gout, peripheral vascular disease, and prostate cancer s/p resection, who presented last night to the [facility] ER with weakness and fever for 2 days, as well as urinary incontinence. He was started on Tramadol this past week by his PCP for arthritis.      PAST MEDICAL & SURGICAL HISTORY:  Gout  PVD  PC (prostate cancer): s/p surgical resection 3 years ago  Hypercholesterolemia  HTN - Hypertension  Arthritis  H/O carotid endarterectomy    Allergies  No Known Allergies    SOCIAL HISTORY:  Denies ETOh,Smoking,     FAMILY HISTORY:  No pertinent family history in first degree relatives    REVIEW OF SYSTEMS:  CONSTITUTIONAL: (+)weakness, (+)fever  EYES/ENT: No visual changes;  No vertigo or throat pain   NECK: No pain or stiffness  RESPIRATORY: No cough, wheezing, hemoptysis; No shortness of breath  CARDIOVASCULAR: No chest pain or palpitations  GASTROINTESTINAL: No abdominal or epigastric pain. No nausea, vomiting, or hematemesis; No diarrhea or constipation. No melena or hematochezia.  GENITOURINARY: No dysuria, frequency or hematuria  NEUROLOGICAL: No numbness; (+)arthritis  SKIN: No itching, burning, rashes, or lesions   All other review of systems is negative unless indicated above.    VITAL:  T(C): , Max: 39.3 (20 @ 04:59)  T(F): , Max: 102.8 (20 @ 04:59)  HR: 74 (20 @ 08:22)  BP: 95/57 (20 @ 08:22)  BP(mean): 92 (20 @ 19:28)  RR: 18 (20 @ 08:22)  SpO2: 91% (20 @ 08:22)    PHYSICAL EXAM:  Constitutional: NAD, Alert  HEENT: NCAT, MMM  Neck: Supple, No JVD  Respiratory: CTA-b/l  Cardiovascular: RRR s1s2, no m/r/g  Gastrointestinal: BS+, soft, NT/ND  Extremities: No peripheral edema b/l  Neurological: no focal deficits; strength grossly intact  Back: no CVAT b/l  Skin: No rashes, no nevi    LABS:                        10.8   5.92  )-----------( 106      ( 27 Aug 2020 07:09 )             32.6     Na(136)/K(4.0)/Cl(102)/HCO3(19)/BUN(47)/Cr(3.00)Glu(120)/Ca(8.3)/Mg(--)/PO4(--)     @ 07:06  Na(136)/K(4.0)/Cl(102)/HCO3(19)/BUN(44)/Cr(2.61)Glu(217)/Ca(8.5)/Mg(--)/PO4(--)     @ 17:22    Urinalysis Basic - ( 26 Aug 2020 17:22 )  Color: Yellow / Appearance: Slightly Turbid / S.018 / pH: x  Gluc: x / Ketone: Negative  / Bili: Negative / Urobili: Negative   Blood: x / Protein: 300 mg/dL / Nitrite: Negative   Leuk Esterase: Negative / RBC: 2 /hpf / WBC 5 /HPF   Sq Epi: x / Non Sq Epi: 5 / Bacteria: Negative      (2016)- BUN 40, Cr 1.82        IMAGING:  < from: CT Abdomen and Pelvis No Cont (20 @ 19:07) >  KIDNEYS/URETERS: Atrophic right kidney. Left renal cyst.  BLADDER: Contains a focus of air. Correlate for recent instrumentation.  IMPRESSION:  Lower lobe consolidation concerning for pneumonia.      ASSESSMENT:  (1)Renal - CKD stage 3-4; given how high his creatinine was back in 2016, there is reason to expect that his baseline would have further risen over the past 4 years - a creatinine in the mid-2s may very well be his baseline. Atrophic right kidney; no hydronephrosis of the left kidney based on imaging from yesterday. Notable proteinuria; likely chronic glomerulonephritis; unclear etiology. Creatinine up today relative to yesterday evening - likely hemodynamically mediated in association with his tenuous hemodynamics    (2)Metabolic acidosis - likely primarily renally mediated    (3)Fever/weakness - presumed due to pneumonia    RECOMMEND:  (1)D/C Losartan for now  (2)Meds for GFR 15ml/min:        (a)Reduce Colchicine from 0.6mg qd to 0.3mg po qd   (3)Gentle IVF - 1/2NS+75meq/L NaHCO3, 50cc/h x 1L  (4)BMP+Mg+PO4 daily  (5)Urine: protein, creatinine  (6)Hold Coreg for SBP<100      Thank you for involving [facility] in this patient's care.    With warm regards,    Edis Cabral MD   [facility]  Office: [phone]  Cell: [phone] HPI:  Mr. Moya is a 80 year-old man with history of multiple medical issues including hypertension, gout, peripheral vascular disease, and prostate cancer s/p resection, who presented last night to the [facility] ER with weakness and fever for 2 days, as well as urinary incontinence. He was started on Tramadol this past week by his PCP for arthritis.    Mr. Moya tells me that he has CKD, and that he follows with nephrologist Dr. Mares in [place]; he has been followed by Dr. Mares for approximately 1 year. He does not know the cause of his CKD. He does not use NSAIDs. He tells me that he has been advised in the past to limit potassium intake. He denies any notable urinary changes over the past few days.    PAST MEDICAL & SURGICAL HISTORY:  Gout  PVD  CKD  PC (prostate cancer): s/p surgical resection 3 years ago  Hypercholesterolemia  HTN - Hypertension  Arthritis  H/O carotid endarterectomy    Allergies  No Known Allergies    SOCIAL HISTORY:  Denies ETOh,Smoking,     FAMILY HISTORY:  No pertinent family history in first degree relatives    REVIEW OF SYSTEMS:  CONSTITUTIONAL: (+)weakness, (+)fever  EYES/ENT: No visual changes;  No vertigo or throat pain   NECK: No pain or stiffness  RESPIRATORY: No cough, wheezing, hemoptysis; No shortness of breath  CARDIOVASCULAR: No chest pain or palpitations  GASTROINTESTINAL: No abdominal or epigastric pain. No nausea, vomiting, or hematemesis; No diarrhea or constipation. No melena or hematochezia.  GENITOURINARY: No dysuria, frequency or hematuria  NEUROLOGICAL: No numbness; (+)arthritis  SKIN: No itching, burning, rashes, or lesions   All other review of systems is negative unless indicated above.    VITAL:  T(C): , Max: 39.3 (20 @ 04:59)  T(F): , Max: 102.8 (20 @ 04:59)  HR: 74 (20 @ 08:22)  BP: 95/57 (20 @ 08:22)  BP(mean): 92 (20 @ 19:28)  RR: 18 (20 @ 08:22)  SpO2: 91% (20 @ 08:22)    PHYSICAL EXAM:  Constitutional: NAD, Alert  HEENT: NCAT, DMM  Neck: Supple, No JVD  Respiratory: CTA-b/l  Cardiovascular: RRR s1s2, no m/r/g  Gastrointestinal: BS+, soft, NT/ND  Extremities: No peripheral edema b/l  Neurological: no focal deficits; strength grossly intact  Back: no CVAT b/l  Skin: No rashes, no nevi    LABS:                        10.8   5.92  )-----------( 106      ( 27 Aug 2020 07:09 )             32.6     Na(136)/K(4.0)/Cl(102)/HCO3(19)/BUN(47)/Cr(3.00)Glu(120)/Ca(8.3)/Mg(--)/PO4(--)     @ 07:06  Na(136)/K(4.0)/Cl(102)/HCO3(19)/BUN(44)/Cr(2.61)Glu(217)/Ca(8.5)/Mg(--)/PO4(--)     @ 17:22    Urinalysis Basic - ( 26 Aug 2020 17:22 )  Color: Yellow / Appearance: Slightly Turbid / S.018 / pH: x  Gluc: x / Ketone: Negative  / Bili: Negative / Urobili: Negative   Blood: x / Protein: 300 mg/dL / Nitrite: Negative   Leuk Esterase: Negative / RBC: 2 /hpf / WBC 5 /HPF   Sq Epi: x / Non Sq Epi: 5 / Bacteria: Negative      (2016)- BUN 40, Cr 1.82        IMAGING:  < from: CT Abdomen and Pelvis No Cont (20 @ 19:07) >  KIDNEYS/URETERS: Atrophic right kidney. Left renal cyst.  BLADDER: Contains a focus of air. Correlate for recent instrumentation.  IMPRESSION:  Lower lobe consolidation concerning for pneumonia.      ASSESSMENT:  (1)Renal - CKD stage 3-4; given how high his creatinine was back in 2016, there is reason to expect that his baseline would have further risen over the past 4 years - a creatinine in the mid-2s may very well be his baseline. Atrophic right kidney; no hydronephrosis of the left kidney based on imaging from yesterday. Notable proteinuria; likely chronic glomerulonephritis; unclear etiology. Creatinine up today relative to yesterday evening - likely hemodynamically mediated in association with his tenuous hemodynamics    (2)Metabolic acidosis - likely primarily renally mediated    (3)Fever/weakness - presumed due to pneumonia    RECOMMEND:  (1)D/C Losartan for now  (2)Meds for GFR 15ml/min:        (a)Reduce Colchicine from 0.6mg qd to 0.3mg po qd   (3)Gentle IVF - 1/2NS+75meq/L NaHCO3, 50cc/h x 1L  (4)BMP+Mg+PO4 daily  (5)Urine: protein, creatinine  (6)Hold Coreg for SBP<100      Thank you for involving [facility] in this patient's care.    With warm regards,    Edis Cabral MD   [facility]  Office: [phone]  Cell: [phone]

## 2020-08-27 NOTE — CONSULT NOTE ADULT - PROBLEM SELECTOR RECOMMENDATION 9
LLL consolidation on CT chest   -He is asymptomatic from a respiratory standpoint (no cough, dyspnea)  -Tmax 102.8 F oral overnight  -F/u blood cultures  -F/u urine legionella  -Abx as per ID LLL consolidation on CT chest   -He is asymptomatic from a respiratory standpoint (no cough, dyspnea) but has clear infiltrate LLL that corresponds to LLL rales.  -Tmax 102.8 F oral overnight  -F/u blood cultures  -F/u urine legionella  -Abx as per ID

## 2020-08-27 NOTE — CONSULT NOTE ADULT - SUBJECTIVE AND OBJECTIVE BOX
Patient is a 80y old  Male who presents with a chief complaint of weakness and fever (27 Aug 2020 01:08)    HPI:  80yom pmhx of HTN , HLD  ,prostate CA , PVD bib ems for weakness and fever for past 2 days. Unable to get off the couch since yesterday. No chest pain no sob, no nausea or vomiting. +incontinence of urine; seen by PMD this week for arthritis and started on Tramadol. (26 Aug 2020 19:28)      PAST MEDICAL & SURGICAL HISTORY:  Gout  PC (prostate cancer): s/p surgical resection 3 years ago  Hypercholesterolemia  HTN - Hypertension  H/O carotid endarterectomy  H/O prostatectomy      Social history:    FAMILY HISTORY:  No pertinent family history in first degree relatives            Allergic/Immunologic:	No hives or rash   Allergies    No Known Allergies    Intolerances        Antimicrobials:    azithromycin  IVPB      azithromycin  IVPB 500 milliGRAM(s) IV Intermittent every 24 hours  cefTRIAXone   IVPB 1000 milliGRAM(s) IV Intermittent every 24 hours        Vital Signs Last 24 Hrs  T(C): 36.9 (27 Aug 2020 08:22), Max: 39.3 (27 Aug 2020 04:59)  T(F): 98.5 (27 Aug 2020 08:22), Max: 102.8 (27 Aug 2020 04:59)  HR: 74 (27 Aug 2020 08:22) (74 - 122)  BP: 139/75 (27 Aug 2020 04:59) (117/91 - 147/77)  BP(mean): 92 (26 Aug 2020 19:28) (87 - 92)  RR: 18 (27 Aug 2020 08:22) (18 - 23)  SpO2: 91% (27 Aug 2020 08:22) (91% - 99%)         Eyes:PERRL EOMI.NO discharge or conjunctival injection    ENMT:No sinus tenderness.No thrush.No pharyngeal exudate or erythema.Fair dental hygiene    Neck:Supple,No LN,no JVD      Respiratory:Good air entry bilaterally,CTA    Cardiovascular:S1 S2 wnl, No murmurs,rub or gallops    Gastrointestinal:Soft BS(+) no tenderness no masses ,No rebound or guarding    Genitourinary:  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     Rectal:    Extremities:No cyanosis,clubbing or edema.                                          10.8   5.92  )-----------( 106      ( 27 Aug 2020 07:09 )             32.6         08-27    136  |  102  |  47<H>  ----------------------------<  120<H>  4.0   |  19<L>  |  3.00<H>    Ca    8.3<L>      27 Aug 2020 07:06    TPro  6.3  /  Alb  3.4  /  TBili  0.7  /  DBili  x   /  AST  64<H>  /  ALT  63<H>  /  AlkPhos  68  08-27      RECENT CULTURES:      MICROBIOLOGY:          Radiology:      Assessment:        Recommendations and Plan:    Pager [phone]  After 5 pm/weekends or if no response :[phone]

## 2020-08-27 NOTE — CONSULT NOTE ADULT - SUBJECTIVE AND OBJECTIVE BOX
VASCULAR SURGERY CONSULT NOTE  --------------------------------------------------------------------------------------------    HPI:   Patient is a 80y old  Male who presents with a chief complaint of weakness and fever (26 Aug 2020 19:28)    HPI:  Mr. Moya is a 81yo M pmhx of HTN , HLD, TIA/possible stroke in  s/p carotid endarterectomy, past inferior MI ,prostate CA s/p prostatectomy, OA of hips and legs, CKD3, gout, presented with inability to "move his legs". Patient states that pain is worse on the L than R, worsens with movement and is alleviated when still. Has seen outside vascular surgeon last year for evaluation --no procedures done then. Patient denies any history of claudication of the lower extremities.       PAST MEDICAL & SURGICAL HISTORY:  Gout  PC (prostate cancer): s/p surgical resection 3 years ago  Hypercholesterolemia  HTN - Hypertension  H/O carotid endarterectomy  H/O prostatectomy    FAMILY HISTORY:  No pertinent family history in first degree relatives    [] Family history not pertinent as reviewed with the patient and family    SOCIAL HISTORY:  ***    ALLERGIES: No Known Allergies      HOME MEDICATIONS:  ***    CURRENT MEDICATIONS  MEDICATIONS (STANDING): atorvastatin 20 milliGRAM(s) Oral at bedtime  azithromycin  IVPB      azithromycin  IVPB 500 milliGRAM(s) IV Intermittent every 24 hours  calcitriol   Capsule 0.25 MICROGram(s) Oral daily  carvedilol 6.25 milliGRAM(s) Oral every 12 hours  cefTRIAXone   IVPB 1000 milliGRAM(s) IV Intermittent every 24 hours  colchicine 0.6 milliGRAM(s) Oral daily  dipyridamole 200 mG/aspirin 25 mG 1 Capsule(s) Oral two times a day  heparin   Injectable 5000 Unit(s) SubCutaneous every 12 hours  losartan 100 milliGRAM(s) Oral daily    MEDICATIONS (PRN):  --------------------------------------------------------------------------------------------    Vitals:   T(C): 37.2 (20 @ 00:27), Max: 37.4 (20 @ 16:30)  HR: 94 (20 @ 00:27) (84 - 122)  BP: 145/80 (20 @ 00:27) (117/91 - 147/77)  RR: 20 (20 @ 00:27) (18 - 23)  SpO2: 96% (20 @ 00:27) (95% - 99%)  CAPILLARY BLOOD GLUCOSE       @ 07:01  -   @ 01:09  --------------------------------------------------------  IN:  Total IN: 0 mL    OUT:    Voided: 200 mL  Total OUT: 200 mL    Total NET: -200 mL        Height (cm): 177.8 ( @ 16:30)  Weight (kg): 99.8 ( @ 16:30)  BMI (kg/m2): 31.6 ( 16:30)  BSA (m2): 2.17 ( @ 16:30)    PHYSICAL EXAM:  General: NAD, Lying in bed comfortably  Neuro: A+Ox3  Cardio: RRR, nml S1/S2  Resp: Good effort  Vascular: All 4 extremities warm, B/l radial pulses palpable, AVF in UE with palpable thrill, b/l Femoral pulse palpable,  b/l DP/PT not palpable, + doppler signals at b/l DP/PT  Skin: b/l anterior shin pink, scaly patches, scab on anterior chin of L leg, no wounds on the feet or toes  Musculoskeletal: Lower extremity ROM decreased b/l L>R. No pain on palpation. Pain on movement of the legs L>R generally, focal point of pain behind the L knee at the upper calf with passive motion, no pain on palpation  --------------------------------------------------------------------------------------------    LABS  CBC (:22)                              11.0<L>                         7.80    )----------------(  118<L>     86.0<H>% Neutrophils, 1.0<L>% Lymphocytes, ANC: 7.02                                33.1<L>    BMP (:)             136     |  102     |  44<H> 		Ca++ --      Ca 8.5                ---------------------------------( 217<H>		Mg --                 4.0     |  19<L>   |  2.61<H>			Ph --        LFTs (:22)      TPro 6.6 / Alb 3.7 / TBili 0.8 / DBili -- / AST 60<H> / ALT 53<H> / AlkPhos 70    Coags ( 17:22)  aPTT 27.0<L> / INR 1.47<H> / PT 17.2<H>    Cardiac Markers (:22)     HSTrop: -- / CKMB: -- / CK: 322      VBG (:22)     -- / -- / -- / -- / -- / --%     Lactate: 1.8    --------------------------------------------------------------------------------------------    MICROBIOLOGY  Urinalysis ( 17:22):     Color: Yellow / Appearance: Slightly Turbid<!> / S.018 / pH: 6.0 / Gluc: Negative / Ketones: Negative / Bili: Negative / Urobili: Negative / Protein :300 mg/dL<!> / Nitrites: Negative / Leuk.Est: Negative / RBC: 2 / WBC: 5 / Sq Epi:  / Non Sq Epi: 5 / Bacteria Negative         --------------------------------------------------------------------------------------------    IMAGING VASCULAR SURGERY CONSULT NOTE  --------------------------------------------------------------------------------------------    HPI:   Patient is a 80y old  Male who presents with a chief complaint of weakness and fever (26 Aug 2020 19:28)    HPI:  Mr. Moya is a 81yo M pmhx of HTN , HLD, TIA/possible stroke in 2012 s/p carotid endarterectomy, past inferior MI ,prostate CA s/p prostatectomy, OA of hips and legs, CKD3, gout, presented with inability to "move his legs". Patient states that pain is worse on the L than R, worsens with movement and is alleviated when still. However, he describes the pain as more of an arthritic than claudication. Has seen outside vascular surgeon last year for evaluation --no procedures indicated/performed at that time. Patient denies any history of claudication of the lower extremities.       PAST MEDICAL & SURGICAL HISTORY:  Gout  PC (prostate cancer): s/p surgical resection 3 years ago  Hypercholesterolemia  HTN - Hypertension  H/O carotid endarterectomy  H/O prostatectomy    FAMILY HISTORY:  No pertinent family history in first degree relatives    [x] Family history not pertinent as reviewed with the patient and family    SOCIAL HISTORY:  Former smoker    ALLERGIES: No Known Allergies      CURRENT MEDICATIONS  MEDICATIONS (STANDING): atorvastatin 20 milliGRAM(s) Oral at bedtime  azithromycin  IVPB      azithromycin  IVPB 500 milliGRAM(s) IV Intermittent every 24 hours  calcitriol   Capsule 0.25 MICROGram(s) Oral daily  carvedilol 6.25 milliGRAM(s) Oral every 12 hours  cefTRIAXone   IVPB 1000 milliGRAM(s) IV Intermittent every 24 hours  colchicine 0.6 milliGRAM(s) Oral daily  dipyridamole 200 mG/aspirin 25 mG 1 Capsule(s) Oral two times a day  heparin   Injectable 5000 Unit(s) SubCutaneous every 12 hours  losartan 100 milliGRAM(s) Oral daily    MEDICATIONS (PRN):  --------------------------------------------------------------------------------------------    Vitals:   T(C): 37.2 (20 @ 00:27), Max: 37.4 (20 @ 16:30)  HR: 94 (20 @ 00:27) (84 - 122)  BP: 145/80 (20 @ 00:27) (117/91 - 147/77)  RR: 20 (20 @ 00:27) (18 - 23)  SpO2: 96% (20 @ 00:27) (95% - 99%)  CAPILLARY BLOOD GLUCOSE       @ 07:01  -   @ 01:09  --------------------------------------------------------  IN:  Total IN: 0 mL    OUT:    Voided: 200 mL  Total OUT: 200 mL    Total NET: -200 mL        Height (cm): 177.8 ( @ 16:30)  Weight (kg): 99.8 ( @ 16:30)  BMI (kg/m2): 31.6 ( @ 16:30)  BSA (m2): 2.17 ( 16:30)    PHYSICAL EXAM:  General: NAD, Lying in bed comfortably  Neuro: A+Ox3  Cardio: RRR, nml S1/S2  Resp: Good effort  Vascular: All 4 extremities warm, B/l radial pulses palpable, AVF in UE with palpable thrill, b/l Femoral pulse palpable,  b/l DP/PT not palpable, + doppler signals at b/l DP/PT  Skin: b/l anterior shin pink, scaly patches, scab on anterior chin of L leg, no wounds on the feet or toes  Musculoskeletal: Lower extremity ROM decreased b/l L>R. No pain on palpation. Pain on movement of the legs L>R generally, focal point of pain behind the L knee at the upper calf with passive motion, no pain on palpation  --------------------------------------------------------------------------------------------    LABS  CBC (:)                              11.0<L>                         7.80    )----------------(  118<L>     86.0<H>% Neutrophils, 1.0<L>% Lymphocytes, ANC: 7.02                                33.1<L>    BMP (:)             136     |  102     |  44<H> 		Ca++ --      Ca 8.5                ---------------------------------( 217<H>		Mg --                 4.0     |  19<L>   |  2.61<H>			Ph --        LFTs (:)      TPro 6.6 / Alb 3.7 / TBili 0.8 / DBili -- / AST 60<H> / ALT 53<H> / AlkPhos 70    Coags (:22)  aPTT 27.0<L> / INR 1.47<H> / PT 17.2<H>    Cardiac Markers (:22)     HSTrop: -- / CKMB: -- / CK: 322      VBG (:22)     -- / -- / -- / -- / -- / --%     Lactate: 1.8    --------------------------------------------------------------------------------------------    MICROBIOLOGY  Urinalysis ( @ 17:22):     Color: Yellow / Appearance: Slightly Turbid<!> / S.018 / pH: 6.0 / Gluc: Negative / Ketones: Negative / Bili: Negative / Urobili: Negative / Protein :300 mg/dL<!> / Nitrites: Negative / Leuk.Est: Negative / RBC: 2 / WBC: 5 / Sq Epi:  / Non Sq Epi: 5 / Bacteria Negative         --------------------------------------------------------------------------------------------    IMAGING    No relevant imaging VASCULAR SURGERY CONSULT NOTE  --------------------------------------------------------------------------------------------    HPI:   Patient is a 80y old  Male who presents with a chief complaint of weakness and fever (26 Aug 2020 19:28)    HPI:  Mr. Moya is a 81yo M pmhx of HTN , HLD, TIA/possible stroke in 2012 s/p carotid endarterectomy, past inferior MI ,prostate CA s/p prostatectomy, OA of hips and legs, CKD3, gout, presented with inability to "move his legs". Patient states that pain is worse on the L than R, worsens with movement and is alleviated when still. However, he describes the pain as more of an arthritic than claudication. Has seen outside vascular surgeon last year for evaluation --no procedures indicated/performed at that time. Patient denies any history of claudication of the lower extremities.       PAST MEDICAL & SURGICAL HISTORY:  Gout  PC (prostate cancer): s/p surgical resection 3 years ago  Hypercholesterolemia  HTN - Hypertension  H/O carotid endarterectomy  H/O prostatectomy    FAMILY HISTORY:  No pertinent family history in first degree relatives    [x] Family history not pertinent as reviewed with the patient and family    SOCIAL HISTORY:  Former smoker    ALLERGIES: No Known Allergies      CURRENT MEDICATIONS  MEDICATIONS (STANDING): atorvastatin 20 milliGRAM(s) Oral at bedtime  azithromycin  IVPB      azithromycin  IVPB 500 milliGRAM(s) IV Intermittent every 24 hours  calcitriol   Capsule 0.25 MICROGram(s) Oral daily  carvedilol 6.25 milliGRAM(s) Oral every 12 hours  cefTRIAXone   IVPB 1000 milliGRAM(s) IV Intermittent every 24 hours  colchicine 0.6 milliGRAM(s) Oral daily  dipyridamole 200 mG/aspirin 25 mG 1 Capsule(s) Oral two times a day  heparin   Injectable 5000 Unit(s) SubCutaneous every 12 hours  losartan 100 milliGRAM(s) Oral daily    MEDICATIONS (PRN):  --------------------------------------------------------------------------------------------    Vitals:   T(C): 37.2 (20 @ 00:27), Max: 37.4 (20 @ 16:30)  HR: 94 (20 @ 00:27) (84 - 122)  BP: 145/80 (20 @ 00:27) (117/91 - 147/77)  RR: 20 (20 @ 00:27) (18 - 23)  SpO2: 96% (20 @ 00:27) (95% - 99%)  CAPILLARY BLOOD GLUCOSE       @ 07:01  -   @ 01:09  --------------------------------------------------------  IN:  Total IN: 0 mL    OUT:    Voided: 200 mL  Total OUT: 200 mL    Total NET: -200 mL        Height (cm): 177.8 ( @ 16:30)  Weight (kg): 99.8 ( @ 16:30)  BMI (kg/m2): 31.6 ( 16:30)  BSA (m2): 2.17 ( 16:30)    PHYSICAL EXAM:  General: NAD, Lying in bed comfortably  Neuro: A+Ox3  Cardio: RRR, nml S1/S2  Resp: Good effort  Vascular: All 4 extremities warm, B/l radial pulses palpable, b/l Femoral pulse palpable,  b/l DP/PT not palpable, + doppler signals at b/l DP/PT  Skin: b/l anterior shin pink, scaly patches, scab on anterior chin of L leg, no wounds on the feet or toes  Musculoskeletal: Lower extremity ROM decreased b/l L>R. No pain on palpation. Pain on movement of the legs L>R generally, focal point of pain behind the L knee at the upper calf with passive motion, no pain on palpation  --------------------------------------------------------------------------------------------    LABS  CBC (:22)                              11.0<L>                         7.80    )----------------(  118<L>     86.0<H>% Neutrophils, 1.0<L>% Lymphocytes, ANC: 7.02                                33.1<L>    BMP (:)             136     |  102     |  44<H> 		Ca++ --      Ca 8.5                ---------------------------------( 217<H>		Mg --                 4.0     |  19<L>   |  2.61<H>			Ph --        LFTs (:22)      TPro 6.6 / Alb 3.7 / TBili 0.8 / DBili -- / AST 60<H> / ALT 53<H> / AlkPhos 70    Coags (:22)  aPTT 27.0<L> / INR 1.47<H> / PT 17.2<H>    Cardiac Markers (:22)     HSTrop: -- / CKMB: -- / CK: 322      VBG (:22)     -- / -- / -- / -- / -- / --%     Lactate: 1.8    --------------------------------------------------------------------------------------------    MICROBIOLOGY  Urinalysis (08-26 @ 17:22):     Color: Yellow / Appearance: Slightly Turbid<!> / S.018 / pH: 6.0 / Gluc: Negative / Ketones: Negative / Bili: Negative / Urobili: Negative / Protein :300 mg/dL<!> / Nitrites: Negative / Leuk.Est: Negative / RBC: 2 / WBC: 5 / Sq Epi:  / Non Sq Epi: 5 / Bacteria Negative         --------------------------------------------------------------------------------------------    IMAGING    No relevant imaging VASCULAR SURGERY CONSULT NOTE  --------------------------------------------------------------------------------------------    HPI:   Patient is a 80y old  Male who presents with a chief complaint of weakness and fever (26 Aug 2020 19:28)    HPI:  Mr. Moya is a 79yo M pmhx of HTN , HLD, TIA/possible stroke in 2012 s/p carotid endarterectomy, past inferior MI ,prostate CA s/p prostatectomy, OA of hips and legs, CKD3, gout, presented with inability to "move his legs". Patient states that pain is worse on the L than R, worsens with movement and is alleviated when still. However, he describes the pain as more of an arthritic than claudication. Has seen outside vascular surgeon last year for evaluation --no procedures indicated/performed at that time.   pt denies nocturnal leg and foot cramsp  pt c/o of raghu le  1/2 block intermittent claudication , and states that 12 mo ago he was able to ambulate pain free  at lest 5-6 blocks     PAST MEDICAL & SURGICAL HISTORY:  Gout  PC (prostate cancer): s/p surgical resection 3 years ago  Hypercholesterolemia  HTN - Hypertension  H/O carotid endarterectomy  H/O prostatectomy    FAMILY HISTORY:  No pertinent family history in first degree relatives    [x] Family history not pertinent as reviewed with the patient and family    SOCIAL HISTORY:  Former smoker    ALLERGIES: No Known Allergies      CURRENT MEDICATIONS  MEDICATIONS (STANDING): atorvastatin 20 milliGRAM(s) Oral at bedtime  azithromycin  IVPB      azithromycin  IVPB 500 milliGRAM(s) IV Intermittent every 24 hours  calcitriol   Capsule 0.25 MICROGram(s) Oral daily  carvedilol 6.25 milliGRAM(s) Oral every 12 hours  cefTRIAXone   IVPB 1000 milliGRAM(s) IV Intermittent every 24 hours  colchicine 0.6 milliGRAM(s) Oral daily  dipyridamole 200 mG/aspirin 25 mG 1 Capsule(s) Oral two times a day  heparin   Injectable 5000 Unit(s) SubCutaneous every 12 hours  losartan 100 milliGRAM(s) Oral daily    MEDICATIONS (PRN):  --------------------------------------------------------------------------------------------    Vitals:   T(C): 37.2 (20 @ 00:27), Max: 37.4 (20 @ 16:30)  HR: 94 (20 @ 00:27) (84 - 122)  BP: 145/80 (20 @ 00:27) (117/91 - 147/77)  RR: 20 (20 @ 00:27) (18 - 23)  SpO2: 96% (20 @ 00:27) (95% - 99%)  CAPILLARY BLOOD GLUCOSE       @ 07:01  -   01:09  --------------------------------------------------------  IN:  Total IN: 0 mL    OUT:    Voided: 200 mL  Total OUT: 200 mL    Total NET: -200 mL        Height (cm): 177.8 ( @ 16:30)  Weight (kg): 99.8 ( 16:30)  BMI (kg/m2): 31.6 ( 16:30)  BSA (m2): 2.17 ( 16:30)    PHYSICAL EXAM:  General: NAD, Lying in bed comfortably  Neuro: A+Ox3  Cardio: RRR, nml S1/S2  Resp: Good effort  Vascular: All 4 extremities warm, B/l radial pulses palpable, b/l Femoral pulse palpable,  b/l DP/PT not palpable, + doppler signals at b/l DP/PT  Skin: b/l anterior shin pink, scaly patches, scab on anterior chin of L leg, no wounds on the feet or toes  Musculoskeletal: Lower extremity ROM decreased b/l L>R. No pain on palpation. Pain on movement of the legs L>R generally, focal point of pain behind the L knee at the upper calf with passive motion, no pain on palpation  --------------------------------------------------------------------------------------------    LABS  CBC (:22)                              11.0<L>                         7.80    )----------------(  118<L>     86.0<H>% Neutrophils, 1.0<L>% Lymphocytes, ANC: 7.02                                33.1<L>    BMP ( 17:22)             136     |  102     |  44<H> 		Ca++ --      Ca 8.5                ---------------------------------( 217<H>		Mg --                 4.0     |  19<L>   |  2.61<H>			Ph --        LFTs (:22)      TPro 6.6 / Alb 3.7 / TBili 0.8 / DBili -- / AST 60<H> / ALT 53<H> / AlkPhos 70    Coags (:22)  aPTT 27.0<L> / INR 1.47<H> / PT 17.2<H>    Cardiac Markers (:22)     HSTrop: -- / CKMB: -- / CK: 322      VBG (:22)     -- / -- / -- / -- / -- / --%     Lactate: 1.8    --------------------------------------------------------------------------------------------    MICROBIOLOGY  Urinalysis ( @ 17:22):     Color: Yellow / Appearance: Slightly Turbid<!> / S.018 / pH: 6.0 / Gluc: Negative / Ketones: Negative / Bili: Negative / Urobili: Negative / Protein :300 mg/dL<!> / Nitrites: Negative / Leuk.Est: Negative / RBC: 2 / WBC: 5 / Sq Epi:  / Non Sq Epi: 5 / Bacteria Negative         --------------------------------------------------------------------------------------------    IMAGING    No relevant imaging

## 2020-08-27 NOTE — CONSULT NOTE ADULT - ASSESSMENT
ASSESSMENT: Patient is a 80y old m with ***    PLAN:   - CTAP done --official read in AM  - CHER/PVR  - r/o DVT with LLE doppler    - to be discussed with Vascular in AM ASSESSMENT: Pt is a 79yo M pmhx of HTN, HLD, TIA/possible stroke in 2012 s/p carotid endarterectomy, past inferior MI ,prostate CA s/p prostatectomy, OA of hips and legs, CKD3, gout, presented with LLE pain and difficulty ambulating. Given vasculopathy, consider PVD, however, clinical characteristics of pain     PLAN:   - CTAP done --official read in AM  - CHER/PVR  - r/o DVT with LLE doppler    - to be discussed with Vascular in AM ASSESSMENT: Pt is a 79yo M pmhx of HTN, HLD, TIA/possible stroke in 2012 s/p carotid endarterectomy, past inferior MI ,prostate CA s/p prostatectomy, OA of hips and legs, CKD3, gout, presented with LLE pain and difficulty ambulating. Given vasculopathy, consider PVD vs other etiology given the nature of his pain.     PLAN:   - CTAP done --official read in AM  - CHER/PVR  - r/o DVT with LLE doppler    - to be discussed with Vascular in AM ASSESSMENT: Pt is a 81yo M pmhx of HTN, HLD, TIA/possible stroke in 2012 s/p carotid endarterectomy, past inferior MI ,prostate CA s/p prostatectomy, OA of hips and legs, CKD3, gout, presented with LLE pain and difficulty ambulating. Given vasculopathy, consider PVD vs other etiology given the nature of his pain.     PLAN:   - CTAP done --official read in AM  - CHER/PVR    - to be discussed with Dr. Alarcon    Vascular Surgery #[id number] ASSESSMENT: Pt is a 81yo M pmhx of HTN, HLD, TIA/possible stroke in 2012 s/p carotid endarterectomy, past inferior MI ,prostate CA s/p prostatectomy, OA of hips and legs, CKD3, gout, presented with LLE pain and difficulty ambulating.       PLAN:   - CTAP done --official read in AM  - CHER/PVR    - to be discussed with Dr. Alarcon    Vascular Surgery #[id number]

## 2020-08-27 NOTE — CONSULT NOTE ADULT - SUBJECTIVE AND OBJECTIVE BOX
PULMONARY CONSULT    HPI: 81 y/o M with PMH of HTN, HLD, prostate CA, PVD. Presents to ED with weakness, L leg pain and fever for past 2 days. Unable to get off the couch since yesterday. Seen by PMD this week for arthritis and started on Tramadol. Found to have LLL consolidation concerning for PNA. C/o L flank pain. Denies SOB, cough, CP, pleuritic CP.    PAST MEDICAL & SURGICAL HISTORY:  Gout  PC (prostate cancer): s/p surgical resection 3 years ago  Hypercholesterolemia  HTN - Hypertension  H/O carotid endarterectomy  H/O prostatectomy    Allergies  No Known Allergies    FAMILY HISTORY:  No pertinent family history in first degree relatives    Social history: former smoker - quit about 54 years ago    Review of Systems:  CONSTITUTIONAL: No fever, chills, or fatigue  EYES: No eye pain, visual disturbances, or discharge  ENMT:  No difficulty hearing, tinnitus, vertigo; No sinus or throat pain  NECK: No pain or stiffness  RESPIRATORY: Per above  CARDIOVASCULAR: No chest pain, palpitations, dizziness, or leg swelling  GASTROINTESTINAL: No abdominal or epigastric pain. No nausea, vomiting, or hematemesis; No diarrhea or constipation. No melena or hematochezia.  GENITOURINARY: No dysuria, frequency, hematuria, or incontinence  NEUROLOGICAL: No headaches, memory loss, loss of strength, numbness, or tremors  SKIN: No itching, burning, rashes, or lesions   MUSCULOSKELETAL: No joint pain or swelling; No muscle, back, or extremity pain  PSYCHIATRIC: No depression, anxiety, mood swings, or difficulty sleeping      Medications:  MEDICATIONS  (STANDING):  atorvastatin 20 milliGRAM(s) Oral at bedtime  azithromycin  IVPB      azithromycin  IVPB 500 milliGRAM(s) IV Intermittent every 24 hours  calcitriol   Capsule 0.25 MICROGram(s) Oral daily  carvedilol 6.25 milliGRAM(s) Oral every 12 hours  cefTRIAXone   IVPB 1000 milliGRAM(s) IV Intermittent every 24 hours  colchicine 0.3 milliGRAM(s) Oral daily  dipyridamole 200 mG/aspirin 25 mG 1 Capsule(s) Oral two times a day  heparin   Injectable 5000 Unit(s) SubCutaneous every 12 hours      Vital Signs Last 24 Hrs  T(C): 36.8 (27 Aug 2020 11:15), Max: 39.3 (27 Aug 2020 04:59)  T(F): 98.2 (27 Aug 2020 11:15), Max: 102.8 (27 Aug 2020 04:59)  HR: 82 (27 Aug 2020 11:15) (74 - 122)  BP: 107/63 (27 Aug 2020 11:15) (95/57 - 147/77)  BP(mean): 92 (26 Aug 2020 19:28) (87 - 92)  RR: 16 (27 Aug 2020 11:15) (16 - 23)  SpO2: 95% (27 Aug 2020 11:15) (91% - 99%)      VBG pH --  @ 17:22  VBG pCO2 --  @ 17:22  VBG O2 sat --  @ 17:22  VBG lactate 1.8  @ 17:22         @ 07:01  -   @ 07:00  --------------------------------------------------------  IN: 340 mL / OUT: 200 mL / NET: 140 mL          LABS:                        10.8   5.92  )-----------( 106      ( 27 Aug 2020 07:09 )             32.6     0827    136  |  102  |  47<H>  ----------------------------<  120<H>  4.0   |  19<L>  |  3.00<H>    Ca    8.3<L>      27 Aug 2020 07:06    TPro  6.3  /  Alb  3.4  /  TBili  0.7  /  DBili  x   /  AST  64<H>  /  ALT  63<H>  /  AlkPhos  68  0827      CARDIAC MARKERS ( 26 Aug 2020 17:22 )  x     / x     / 322 U/L / x     / x            PT/INR - ( 26 Aug 2020 17:22 )   PT: 17.2 sec;   INR: 1.47 ratio         PTT - ( 26 Aug 2020 17:22 )  PTT:27.0 sec  Urinalysis Basic - ( 26 Aug 2020 17:22 )    Color: Yellow / Appearance: Slightly Turbid / S.018 / pH: x  Gluc: x / Ketone: Negative  / Bili: Negative / Urobili: Negative   Blood: x / Protein: 300 mg/dL / Nitrite: Negative   Leuk Esterase: Negative / RBC: 2 /hpf / WBC 5 /HPF   Sq Epi: x / Non Sq Epi: 5 / Bacteria: Negative          Physical Examination:    General: No acute distress.      HEENT: Pupils equal, reactive to light.  Symmetric.    PULM: Clear to auscultation bilaterally, no significant sputum production    CVS: RRR    ABD: Soft, nondistended, nontender, normoactive bowel sounds, no masses    EXT: No edema, nontender    SKIN: Warm and well perfused, no rashes noted.    NEURO: Alert, oriented, interactive, nonfocal      RADIOLOGY REVIEWED  CT chest: < from: CT Chest No Cont (20 @ 19:07) >  FINDINGS:  CHEST:  LUNGS AND LARGE AIRWAYS: Patent central airways. Left lower lobe consolidation.  PLEURA: No pleural effusion.  VESSELS: Atherosclerotic changes of the aorta and coronary arteries.  HEART: Heart size is normal. No pericardial effusion.  MEDIASTINUM AND CARLTON: No lymphadenopathy.  CHEST WALL AND LOWER NECK: Within normal limits.    ABDOMEN AND PELVIS:  LIVER: Within normal limits.  BILE DUCTS: Normal caliber.  GALLBLADDER: Cholelithiasis.  SPLEEN: Within normal limits.  PANCREAS: Within normal limits.  ADRENALS: Within normal limits.  KIDNEYS/URETERS: Atrophic right kidney. Left renal cyst.    BLADDER: Contains a focus of air. Correlate for recent instrumentation.  REPRODUCTIVE ORGANS: Prostate not visualized. Correlate with surgical history.    BOWEL: Colonic diverticulosis without diverticulitis. Nobowel obstruction. Appendix is normal.  PERITONEUM: No ascites.  VESSELS: Atherosclerotic changes.  RETROPERITONEUM/LYMPH NODES: No lymphadenopathy.  ABDOMINAL WALL: Within normal limits.  BONES: Degenerative changes.    IMPRESSION:  Lower lobe consolidation concerning for pneumonia.    No acute pathology in the abdomen or pelvis.    < end of copied text > PULMONARY CONSULT    HPI: 79 y/o M with PMH of HTN, HLD, prostate CA, PVD. Presents to ED with weakness, L leg pain and fever for past 2 days. Unable to get off the couch since yesterday. Seen by PMD this week for arthritis and started on Tramadol. Found to have LLL consolidation concerning for PNA. C/o L flank pain. Denies SOB, cough, CP, pleuritic CP.    PAST MEDICAL & SURGICAL HISTORY:  Gout  PC (prostate cancer): s/p surgical resection 3 years ago  Hypercholesterolemia  HTN - Hypertension  H/O carotid endarterectomy  H/O prostatectomy    Allergies  No Known Allergies    FAMILY HISTORY:  No pertinent family history in first degree relatives    Social history: former smoker - quit about 54 years ago    Review of Systems:  CONSTITUTIONAL: No fever, chills, or fatigue  EYES: No eye pain, visual disturbances, or discharge  ENMT:  No difficulty hearing, tinnitus, vertigo; No sinus or throat pain  NECK: No pain or stiffness  RESPIRATORY: Per above  CARDIOVASCULAR: No chest pain, palpitations, dizziness, or leg swelling  GASTROINTESTINAL: No abdominal or epigastric pain. No nausea, vomiting, or hematemesis; No diarrhea or constipation. No melena or hematochezia.  GENITOURINARY: No dysuria, frequency, hematuria  NEUROLOGICAL: No headaches, memory loss, loss of strength, numbness, or tremors  SKIN: No itching, burning, rashes, or lesions   MUSCULOSKELETAL: Per above  PSYCHIATRIC: No depression, anxiety, mood swings, or difficulty sleeping      Medications:  MEDICATIONS  (STANDING):  atorvastatin 20 milliGRAM(s) Oral at bedtime  azithromycin  IVPB      azithromycin  IVPB 500 milliGRAM(s) IV Intermittent every 24 hours  calcitriol   Capsule 0.25 MICROGram(s) Oral daily  carvedilol 6.25 milliGRAM(s) Oral every 12 hours  cefTRIAXone   IVPB 1000 milliGRAM(s) IV Intermittent every 24 hours  colchicine 0.3 milliGRAM(s) Oral daily  dipyridamole 200 mG/aspirin 25 mG 1 Capsule(s) Oral two times a day  heparin   Injectable 5000 Unit(s) SubCutaneous every 12 hours      Vital Signs Last 24 Hrs  T(C): 36.8 (27 Aug 2020 11:15), Max: 39.3 (27 Aug 2020 04:59)  T(F): 98.2 (27 Aug 2020 11:15), Max: 102.8 (27 Aug 2020 04:59)  HR: 82 (27 Aug 2020 11:15) (74 - 122)  BP: 107/63 (27 Aug 2020 11:15) (95/57 - 147/77)  BP(mean): 92 (26 Aug 2020 19:28) (87 - 92)  RR: 16 (27 Aug 2020 11:15) (16 - 23)  SpO2: 95% (27 Aug 2020 11:15) (91% - 99%)      VBG pH --  @ 17:22  VBG pCO2 --  @ 17:22  VBG O2 sat --  @ 17:22  VBG lactate 1.8  @ 17:22         @ 07:01  -   @ 07:00  --------------------------------------------------------  IN: 340 mL / OUT: 200 mL / NET: 140 mL          LABS:                        10.8   5.92  )-----------( 106      ( 27 Aug 2020 07:09 )             32.6     08-27    136  |  102  |  47<H>  ----------------------------<  120<H>  4.0   |  19<L>  |  3.00<H>    Ca    8.3<L>      27 Aug 2020 07:06    TPro  6.3  /  Alb  3.4  /  TBili  0.7  /  DBili  x   /  AST  64<H>  /  ALT  63<H>  /  AlkPhos  68        CARDIAC MARKERS ( 26 Aug 2020 17:22 )  x     / x     / 322 U/L / x     / x            PT/INR - ( 26 Aug 2020 17:22 )   PT: 17.2 sec;   INR: 1.47 ratio         PTT - ( 26 Aug 2020 17:22 )  PTT:27.0 sec  Urinalysis Basic - ( 26 Aug 2020 17:22 )    Color: Yellow / Appearance: Slightly Turbid / S.018 / pH: x  Gluc: x / Ketone: Negative  / Bili: Negative / Urobili: Negative   Blood: x / Protein: 300 mg/dL / Nitrite: Negative   Leuk Esterase: Negative / RBC: 2 /hpf / WBC 5 /HPF   Sq Epi: x / Non Sq Epi: 5 / Bacteria: Negative          Physical Examination:    General: No acute distress.      HEENT: Pupils equal, reactive to light.  Symmetric.    PULM: Clear to auscultation bilaterally, no significant sputum production    CVS: RRR    ABD: Soft, nondistended, nontender, normoactive bowel sounds, no masses    EXT: No edema, nontender    SKIN: Warm and well perfused, no rashes noted.    NEURO: Alert, oriented, interactive, nonfocal      RADIOLOGY REVIEWED  CT chest: < from: CT Chest No Cont (20 @ 19:07) >  FINDINGS:  CHEST:  LUNGS AND LARGE AIRWAYS: Patent central airways. Left lower lobe consolidation.  PLEURA: No pleural effusion.  VESSELS: Atherosclerotic changes of the aorta and coronary arteries.  HEART: Heart size is normal. No pericardial effusion.  MEDIASTINUM AND CARLTON: No lymphadenopathy.  CHEST WALL AND LOWER NECK: Within normal limits.    ABDOMEN AND PELVIS:  LIVER: Within normal limits.  BILE DUCTS: Normal caliber.  GALLBLADDER: Cholelithiasis.  SPLEEN: Within normal limits.  PANCREAS: Within normal limits.  ADRENALS: Within normal limits.  KIDNEYS/URETERS: Atrophic right kidney. Left renal cyst.    BLADDER: Contains a focus of air. Correlate for recent instrumentation.  REPRODUCTIVE ORGANS: Prostate not visualized. Correlate with surgical history.    BOWEL: Colonic diverticulosis without diverticulitis. Nobowel obstruction. Appendix is normal.  PERITONEUM: No ascites.  VESSELS: Atherosclerotic changes.  RETROPERITONEUM/LYMPH NODES: No lymphadenopathy.  ABDOMINAL WALL: Within normal limits.  BONES: Degenerative changes.    IMPRESSION:  Lower lobe consolidation concerning for pneumonia.    No acute pathology in the abdomen or pelvis.    < end of copied text > PULMONARY CONSULT    HPI: 81 y/o M with PMH of HTN, HLD, prostate CA, PVD. Presents to ED with weakness, L leg pain and fever for past 2 days. Unable to get off the couch since yesterday. Seen by PMD this week for arthritis and started on Tramadol. Found to have LLL consolidation concerning for PNA. C/o L flank pain. Denies SOB, cough, CP, pleuritic CP.    PAST MEDICAL & SURGICAL HISTORY:  Gout  PC (prostate cancer): s/p surgical resection 3 years ago  Hypercholesterolemia  HTN - Hypertension  H/O carotid endarterectomy  H/O prostatectomy    Allergies  No Known Allergies    FAMILY HISTORY:  No pertinent family history in first degree relatives    Social history: former smoker - quit about 54 years ago    Review of Systems:  CONSTITUTIONAL: No fever, chills, or fatigue  EYES: No eye pain, visual disturbances, or discharge  ENMT:  No difficulty hearing, tinnitus, vertigo; No sinus or throat pain  NECK: No pain or stiffness  RESPIRATORY: Per above  CARDIOVASCULAR: No chest pain, palpitations, dizziness, or leg swelling  GASTROINTESTINAL: No abdominal or epigastric pain. No nausea, vomiting, or hematemesis; No diarrhea or constipation. No melena or hematochezia.  GENITOURINARY: No dysuria, frequency, hematuria  NEUROLOGICAL: No headaches, memory loss, loss of strength, numbness, or tremors  SKIN: No itching, burning, rashes, or lesions   MUSCULOSKELETAL: Per above  PSYCHIATRIC: No depression, anxiety, mood swings, or difficulty sleeping      Medications:  MEDICATIONS  (STANDING):  atorvastatin 20 milliGRAM(s) Oral at bedtime  azithromycin  IVPB      azithromycin  IVPB 500 milliGRAM(s) IV Intermittent every 24 hours  calcitriol   Capsule 0.25 MICROGram(s) Oral daily  carvedilol 6.25 milliGRAM(s) Oral every 12 hours  cefTRIAXone   IVPB 1000 milliGRAM(s) IV Intermittent every 24 hours  colchicine 0.3 milliGRAM(s) Oral daily  dipyridamole 200 mG/aspirin 25 mG 1 Capsule(s) Oral two times a day  heparin   Injectable 5000 Unit(s) SubCutaneous every 12 hours      Vital Signs Last 24 Hrs  T(C): 36.8 (27 Aug 2020 11:15), Max: 39.3 (27 Aug 2020 04:59)  T(F): 98.2 (27 Aug 2020 11:15), Max: 102.8 (27 Aug 2020 04:59)  HR: 82 (27 Aug 2020 11:15) (74 - 122)  BP: 107/63 (27 Aug 2020 11:15) (95/57 - 147/77)  BP(mean): 92 (26 Aug 2020 19:28) (87 - 92)  RR: 16 (27 Aug 2020 11:15) (16 - 23)  SpO2: 95% (27 Aug 2020 11:15) (91% - 99%)      VBG pH --  @ 17:22  VBG pCO2 --  @ 17:22  VBG O2 sat --  @ 17:22  VBG lactate 1.8  @ 17:22         @ 07:01  -   @ 07:00  --------------------------------------------------------  IN: 340 mL / OUT: 200 mL / NET: 140 mL          LABS:                        10.8   5.92  )-----------( 106      ( 27 Aug 2020 07:09 )             32.6     08-27    136  |  102  |  47<H>  ----------------------------<  120<H>  4.0   |  19<L>  |  3.00<H>    Ca    8.3<L>      27 Aug 2020 07:06    TPro  6.3  /  Alb  3.4  /  TBili  0.7  /  DBili  x   /  AST  64<H>  /  ALT  63<H>  /  AlkPhos  68        CARDIAC MARKERS ( 26 Aug 2020 17:22 )  x     / x     / 322 U/L / x     / x            PT/INR - ( 26 Aug 2020 17:22 )   PT: 17.2 sec;   INR: 1.47 ratio         PTT - ( 26 Aug 2020 17:22 )  PTT:27.0 sec  Urinalysis Basic - ( 26 Aug 2020 17:22 )    Color: Yellow / Appearance: Slightly Turbid / S.018 / pH: x  Gluc: x / Ketone: Negative  / Bili: Negative / Urobili: Negative   Blood: x / Protein: 300 mg/dL / Nitrite: Negative   Leuk Esterase: Negative / RBC: 2 /hpf / WBC 5 /HPF   Sq Epi: x / Non Sq Epi: 5 / Bacteria: Negative          Physical Examination:    General: No acute distress.      HEENT: Pupils equal, reactive to light.  Symmetric.    PULM: crackles L base    CVS: RRR    ABD: Soft, nondistended, nontender, normoactive bowel sounds, no masses    EXT: No edema, nontender    SKIN: Warm and well perfused, no rashes noted.    NEURO: Alert, oriented, interactive, nonfocal      RADIOLOGY REVIEWED  CT chest: < from: CT Chest No Cont (20 @ 19:07) >  FINDINGS:  CHEST:  LUNGS AND LARGE AIRWAYS: Patent central airways. Left lower lobe consolidation.  PLEURA: No pleural effusion.  VESSELS: Atherosclerotic changes of the aorta and coronary arteries.  HEART: Heart size is normal. No pericardial effusion.  MEDIASTINUM AND CARLTON: No lymphadenopathy.  CHEST WALL AND LOWER NECK: Within normal limits.    ABDOMEN AND PELVIS:  LIVER: Within normal limits.  BILE DUCTS: Normal caliber.  GALLBLADDER: Cholelithiasis.  SPLEEN: Within normal limits.  PANCREAS: Within normal limits.  ADRENALS: Within normal limits.  KIDNEYS/URETERS: Atrophic right kidney. Left renal cyst.    BLADDER: Contains a focus of air. Correlate for recent instrumentation.  REPRODUCTIVE ORGANS: Prostate not visualized. Correlate with surgical history.    BOWEL: Colonic diverticulosis without diverticulitis. Nobowel obstruction. Appendix is normal.  PERITONEUM: No ascites.  VESSELS: Atherosclerotic changes.  RETROPERITONEUM/LYMPH NODES: No lymphadenopathy.  ABDOMINAL WALL: Within normal limits.  BONES: Degenerative changes.    IMPRESSION:  Lower lobe consolidation concerning for pneumonia.    No acute pathology in the abdomen or pelvis.    < end of copied text > PULMONARY CONSULT    HPI: 79 y/o M with PMH of HTN, HLD, prostate CA, PVD. Presents to ED with weakness, L leg pain and fever for past 2 days. Unable to get off the couch since yesterday. Seen by PMD this week for arthritis and started on Tramadol. Found to have LLL consolidation concerning for PNA. C/o L flank pain. Denies SOB, cough, CP, pleuritic CP.    PAST MEDICAL & SURGICAL HISTORY:  Gout  PC (prostate cancer): s/p surgical resection 3 years ago  Hypercholesterolemia  HTN - Hypertension  H/O carotid endarterectomy  H/O prostatectomy    Allergies  No Known Allergies    FAMILY HISTORY:  No pertinent family history in first degree relatives    Social history: former smoker - quit about 54 years ago    Review of Systems:  CONSTITUTIONAL: No fever, chills, or fatigue  EYES: No eye pain, visual disturbances, or discharge  ENMT:  No difficulty hearing, tinnitus, vertigo; No sinus or throat pain  NECK: No pain or stiffness  RESPIRATORY: Per above  CARDIOVASCULAR: No chest pain, palpitations, dizziness, or leg swelling  GASTROINTESTINAL: No abdominal or epigastric pain. No nausea, vomiting, or hematemesis; No diarrhea or constipation. No melena or hematochezia.  GENITOURINARY: No dysuria, frequency, hematuria  NEUROLOGICAL: No headaches, memory loss, loss of strength, numbness, or tremors  SKIN: No itching, burning, rashes, or lesions   MUSCULOSKELETAL: Per above  PSYCHIATRIC: No depression, anxiety, mood swings, or difficulty sleeping      Medications:  MEDICATIONS  (STANDING):  atorvastatin 20 milliGRAM(s) Oral at bedtime  azithromycin  IVPB      azithromycin  IVPB 500 milliGRAM(s) IV Intermittent every 24 hours  calcitriol   Capsule 0.25 MICROGram(s) Oral daily  carvedilol 6.25 milliGRAM(s) Oral every 12 hours  cefTRIAXone   IVPB 1000 milliGRAM(s) IV Intermittent every 24 hours  colchicine 0.3 milliGRAM(s) Oral daily  dipyridamole 200 mG/aspirin 25 mG 1 Capsule(s) Oral two times a day  heparin   Injectable 5000 Unit(s) SubCutaneous every 12 hours      Vital Signs Last 24 Hrs  T(C): 36.8 (27 Aug 2020 11:15), Max: 39.3 (27 Aug 2020 04:59)  T(F): 98.2 (27 Aug 2020 11:15), Max: 102.8 (27 Aug 2020 04:59)  HR: 82 (27 Aug 2020 11:15) (74 - 122)  BP: 107/63 (27 Aug 2020 11:15) (95/57 - 147/77)  BP(mean): 92 (26 Aug 2020 19:28) (87 - 92)  RR: 16 (27 Aug 2020 11:15) (16 - 23)  SpO2: 95% (27 Aug 2020 11:15) (91% - 99%)      VBG pH --  @ 17:22  VBG pCO2 --  @ 17:22  VBG O2 sat --  @ 17:22  VBG lactate 1.8  @ 17:22         @ 07:01  -   @ 07:00  --------------------------------------------------------  IN: 340 mL / OUT: 200 mL / NET: 140 mL          LABS:                        10.8   5.92  )-----------( 106      ( 27 Aug 2020 07:09 )             32.6     08-27    136  |  102  |  47<H>  ----------------------------<  120<H>  4.0   |  19<L>  |  3.00<H>    Ca    8.3<L>      27 Aug 2020 07:06    TPro  6.3  /  Alb  3.4  /  TBili  0.7  /  DBili  x   /  AST  64<H>  /  ALT  63<H>  /  AlkPhos  68        CARDIAC MARKERS ( 26 Aug 2020 17:22 )  x     / x     / 322 U/L / x     / x            PT/INR - ( 26 Aug 2020 17:22 )   PT: 17.2 sec;   INR: 1.47 ratio         PTT - ( 26 Aug 2020 17:22 )  PTT:27.0 sec  Urinalysis Basic - ( 26 Aug 2020 17:22 )    Color: Yellow / Appearance: Slightly Turbid / S.018 / pH: x  Gluc: x / Ketone: Negative  / Bili: Negative / Urobili: Negative   Blood: x / Protein: 300 mg/dL / Nitrite: Negative   Leuk Esterase: Negative / RBC: 2 /hpf / WBC 5 /HPF   Sq Epi: x / Non Sq Epi: 5 / Bacteria: Negative          Physical Examination:    General: No acute distress.      HEENT: Pupils equal, reactive to light.  Symmetric.    PULM: crackles L base    CVS: RRR    ABD: Soft, nondistended, nontender, normoactive bowel sounds, no masses    EXT: No edema, nontender    SKIN: Warm and well perfused, no rashes noted.    NEURO: Alert, oriented, interactive, nonfocal      RADIOLOGY REVIEWED  CT chest: < from: CT Chest No Cont (20 @ 19:07) >  FINDINGS:  CHEST:  LUNGS AND LARGE AIRWAYS: Patent central airways. Left lower lobe consolidation.  PLEURA: No pleural effusion.  VESSELS: Atherosclerotic changes of the aorta and coronary arteries.  HEART: Heart size is normal. No pericardial effusion.  MEDIASTINUM AND CARLTON: No lymphadenopathy.  CHEST WALL AND LOWER NECK: Within normal limits.    ABDOMEN AND PELVIS:  LIVER: Within normal limits.  BILE DUCTS: Normal caliber.  GALLBLADDER: Cholelithiasis.  SPLEEN: Within normal limits.  PANCREAS: Within normal limits.  ADRENALS: Within normal limits.  KIDNEYS/URETERS: Atrophic right kidney. Left renal cyst.    BLADDER: Contains a focus of air. Correlate for recent instrumentation.  REPRODUCTIVE ORGANS: Prostate not visualized. Correlate with surgical history.    BOWEL: Colonic diverticulosis without diverticulitis. Nobowel obstruction. Appendix is normal.  PERITONEUM: No ascites.  VESSELS: Atherosclerotic changes.  RETROPERITONEUM/LYMPH NODES: No lymphadenopathy.  ABDOMINAL WALL: Within normal limits.  BONES: Degenerative changes.    IMPRESSION:  Lower lobe pneumonia.    No acute pathology in the abdomen or pelvis.    < end of copied text >

## 2020-08-27 NOTE — PROGRESS NOTE ADULT - ASSESSMENT
81yo M pmhx of HTN, HLD, TIA/possible stroke in 2012 s/p carotid endarterectomy, past inferior MI ,prostate CA s/p prostatectomy, OA of hips and legs, CKD3, gout, presented with LLE pain and difficulty ambulating. Given vasculopathy, consider PVD vs other etiology given the nature of his pain. CHER/PVR completed.    Plan:  - Will review CHER/PVR results  - Care per primary team    Vascular Surgery  [id number] 81yo M pmhx of HTN, HLD, TIA/possible stroke in 2012 s/p carotid endarterectomy, past inferior MI ,prostate CA s/p prostatectomy, OA of hips and legs, CKD3, gout, presented with LLE pain and difficulty ambulating.   s/o art insuff       Plan:  -recommend himanshu/pvr  will follow

## 2020-08-27 NOTE — CONSULT NOTE ADULT - PROBLEM SELECTOR RECOMMENDATION 9
I Agus Alarcon MD performed a history and physical exam of the patient and discussed  the findings and plan with the house officer. I reviewed the resident note and agree with the findings and plan

## 2020-08-27 NOTE — PROGRESS NOTE ADULT - ASSESSMENT
80yom pmhx of HTN HLD ,PVD prostate CA bib ems for weakness and fever for past 2 days. unable to get off the couch since yesterday. No chest pain no sob, no nausea or vomiting. +incontinence of urine; seen by PMD this week for arthritis and started on Tramadol.     Problem/Plan - 1:  ·  Problem: Sepsis.  Plan: Hemodynamically stable. S/P cultures . IV Abxs. ID help appreciated.     Clinically better.      Problem/Plan - 2:  ·  Problem: Pneumonia.  Plan: IV Abxs.      Problem/Plan - 3:  ·  Problem: Left leg pain.  Plan: Has PVD . Vascular consulted. . Duplex pending.      Problem/Plan - 4:  ·  Problem: CKD (chronic kidney disease) stage 3, GFR 30-59 ml/min with Metabolic Acidosis .  Plan: Renal following.      Problem/Plan - 5:  ·  Problem: Hypertension.  Plan: BP meds with hold parameters.      Problem/Plan - 6:  Problem: Hypercholesterolemia. Plan: Statin.     Problem/Plan - 7:  ·  Problem: Gout.  Plan: Colchicine.      Problem/Plan - 8:  ·  Problem: Anemia, chronic disease.  Plan: Work up pending.      Problem/Plan - 9:  ·  Problem:  PC (prostate cancer).  Plan: Outpt follow up.      Problem/Plan - 10:  Problem: Cholelithiasis. Plan; No RUQ pain but elevated LFT and sepsis  so surgery consulted.

## 2020-08-28 LAB
ALBUMIN SERPL ELPH-MCNC: 3.2 G/DL — LOW (ref 3.3–5)
ALP SERPL-CCNC: 100 U/L — SIGNIFICANT CHANGE UP (ref 40–120)
ALT FLD-CCNC: 282 U/L — HIGH (ref 10–45)
ANION GAP SERPL CALC-SCNC: 17 MMOL/L — SIGNIFICANT CHANGE UP (ref 5–17)
AST SERPL-CCNC: 315 U/L — HIGH (ref 10–40)
BILIRUB SERPL-MCNC: 0.8 MG/DL — SIGNIFICANT CHANGE UP (ref 0.2–1.2)
BUN SERPL-MCNC: 68 MG/DL — HIGH (ref 7–23)
CALCIUM SERPL-MCNC: 8.1 MG/DL — LOW (ref 8.4–10.5)
CHLORIDE SERPL-SCNC: 100 MMOL/L — SIGNIFICANT CHANGE UP (ref 96–108)
CHLORIDE UR-SCNC: <35 MMOL/L — SIGNIFICANT CHANGE UP
CK SERPL-CCNC: 138 U/L — SIGNIFICANT CHANGE UP (ref 30–200)
CO2 SERPL-SCNC: 20 MMOL/L — LOW (ref 22–31)
CREAT ?TM UR-MCNC: 77 MG/DL — SIGNIFICANT CHANGE UP
CREAT ?TM UR-MCNC: 95 MG/DL — SIGNIFICANT CHANGE UP
CREAT SERPL-MCNC: 4.6 MG/DL — HIGH (ref 0.5–1.3)
GLUCOSE SERPL-MCNC: 94 MG/DL — SIGNIFICANT CHANGE UP (ref 70–99)
HCT VFR BLD CALC: 30.5 % — LOW (ref 39–50)
HGB BLD-MCNC: 10 G/DL — LOW (ref 13–17)
LEGIONELLA AG UR QL: NEGATIVE — SIGNIFICANT CHANGE UP
MCHC RBC-ENTMCNC: 32.8 GM/DL — SIGNIFICANT CHANGE UP (ref 32–36)
MCHC RBC-ENTMCNC: 36 PG — HIGH (ref 27–34)
MCV RBC AUTO: 109.7 FL — HIGH (ref 80–100)
NRBC # BLD: 0 /100 WBCS — SIGNIFICANT CHANGE UP (ref 0–0)
PLATELET # BLD AUTO: 95 K/UL — LOW (ref 150–400)
POTASSIUM SERPL-MCNC: 3.9 MMOL/L — SIGNIFICANT CHANGE UP (ref 3.5–5.3)
POTASSIUM SERPL-SCNC: 3.9 MMOL/L — SIGNIFICANT CHANGE UP (ref 3.5–5.3)
POTASSIUM UR-SCNC: 16 MMOL/L — SIGNIFICANT CHANGE UP
PROT ?TM UR-MCNC: 97 MG/DL — HIGH (ref 0–12)
PROT SERPL-MCNC: 6 G/DL — SIGNIFICANT CHANGE UP (ref 6–8.3)
PROT/CREAT UR-RTO: 1 RATIO — HIGH (ref 0–0.2)
RBC # BLD: 2.78 M/UL — LOW (ref 4.2–5.8)
RBC # FLD: 17.9 % — HIGH (ref 10.3–14.5)
SODIUM SERPL-SCNC: 137 MMOL/L — SIGNIFICANT CHANGE UP (ref 135–145)
SODIUM UR-SCNC: 50 MMOL/L — SIGNIFICANT CHANGE UP
WBC # BLD: 4.73 K/UL — SIGNIFICANT CHANGE UP (ref 3.8–10.5)
WBC # FLD AUTO: 4.73 K/UL — SIGNIFICANT CHANGE UP (ref 3.8–10.5)

## 2020-08-28 PROCEDURE — 73700 CT LOWER EXTREMITY W/O DYE: CPT | Mod: 26,LT

## 2020-08-28 PROCEDURE — 99233 SBSQ HOSP IP/OBS HIGH 50: CPT

## 2020-08-28 PROCEDURE — 99232 SBSQ HOSP IP/OBS MODERATE 35: CPT

## 2020-08-28 RX ORDER — SODIUM CHLORIDE 9 MG/ML
1000 INJECTION, SOLUTION INTRAVENOUS
Refills: 0 | Status: DISCONTINUED | OUTPATIENT
Start: 2020-08-28 | End: 2020-08-31

## 2020-08-28 RX ORDER — CARVEDILOL PHOSPHATE 80 MG/1
6.25 CAPSULE, EXTENDED RELEASE ORAL EVERY 12 HOURS
Refills: 0 | Status: DISCONTINUED | OUTPATIENT
Start: 2020-08-28 | End: 2020-09-03

## 2020-08-28 RX ORDER — CEFEPIME 1 G/1
1000 INJECTION, POWDER, FOR SOLUTION INTRAMUSCULAR; INTRAVENOUS EVERY 24 HOURS
Refills: 0 | Status: COMPLETED | OUTPATIENT
Start: 2020-08-28 | End: 2020-09-03

## 2020-08-28 RX ORDER — SODIUM CHLORIDE 9 MG/ML
1000 INJECTION, SOLUTION INTRAVENOUS
Refills: 0 | Status: DISCONTINUED | OUTPATIENT
Start: 2020-08-28 | End: 2020-08-28

## 2020-08-28 RX ADMIN — CEFEPIME 100 MILLIGRAM(S): 1 INJECTION, POWDER, FOR SOLUTION INTRAMUSCULAR; INTRAVENOUS at 11:48

## 2020-08-28 RX ADMIN — ASPIRIN AND DIPYRIDAMOLE 1 CAPSULE(S): 25; 200 CAPSULE, EXTENDED RELEASE ORAL at 05:24

## 2020-08-28 RX ADMIN — CARVEDILOL PHOSPHATE 6.25 MILLIGRAM(S): 80 CAPSULE, EXTENDED RELEASE ORAL at 19:27

## 2020-08-28 RX ADMIN — CARVEDILOL PHOSPHATE 6.25 MILLIGRAM(S): 80 CAPSULE, EXTENDED RELEASE ORAL at 05:24

## 2020-08-28 RX ADMIN — SODIUM CHLORIDE 100 MILLILITER(S): 9 INJECTION, SOLUTION INTRAVENOUS at 19:27

## 2020-08-28 RX ADMIN — HEPARIN SODIUM 5000 UNIT(S): 5000 INJECTION INTRAVENOUS; SUBCUTANEOUS at 05:26

## 2020-08-28 RX ADMIN — CARVEDILOL PHOSPHATE 6.25 MILLIGRAM(S): 80 CAPSULE, EXTENDED RELEASE ORAL at 17:23

## 2020-08-28 RX ADMIN — Medication 0.3 MILLIGRAM(S): at 11:30

## 2020-08-28 RX ADMIN — CALCITRIOL 0.25 MICROGRAM(S): 0.5 CAPSULE ORAL at 11:31

## 2020-08-28 RX ADMIN — ATORVASTATIN CALCIUM 20 MILLIGRAM(S): 80 TABLET, FILM COATED ORAL at 21:31

## 2020-08-28 RX ADMIN — ASPIRIN AND DIPYRIDAMOLE 1 CAPSULE(S): 25; 200 CAPSULE, EXTENDED RELEASE ORAL at 17:23

## 2020-08-28 RX ADMIN — HEPARIN SODIUM 5000 UNIT(S): 5000 INJECTION INTRAVENOUS; SUBCUTANEOUS at 17:23

## 2020-08-28 NOTE — PROGRESS NOTE ADULT - ASSESSMENT
80yom pmhx of HTN HLD ,PVD prostate CA bib ems for weakness and fever for past 2 days. unable to get off the couch since yesterday. No chest pain no sob, no nausea or vomiting. +incontinence of urine; seen by PMD this week for arthritis and started on Tramadol.     Problem/Plan - 1:  ·  Problem: Sepsis.  Plan: Hemodynamically stable. S/P cultures . IV Abxs. ID help appreciated.     Clinically better.      Problem/Plan - 2:  ·  Problem: Pneumonia.  Plan: IV Abxs. Pulmonary following.      Problem/Plan - 3:  ·  Problem: Left leg pain with PVD .  Plan:  Vascular helping.      Problem/Plan - 4:  ·  Problem: CKD (chronic kidney disease) stage 3, GFR 30-59 ml/min with Metabolic Acidosis with JUAN RAMON  .  Plan: Renal following. BMP worsening.      Problem/Plan - 5:  ·  Problem: Hypertension.  Plan: BP meds with hold parameters.      Problem/Plan - 6:  Problem: Hypercholesterolemia. Plan: Statin.     Problem/Plan - 7:  ·  Problem: Gout.  Plan: Colchicine.      Problem/Plan - 8:  ·  Problem: Anemia, chronic disease with Thrombocytopenia .  Plan: Work up pending. Hematology consulted.      Problem/Plan - 9:  ·  Problem:  PC (prostate cancer).  Plan: Outpt follow up.      Problem/Plan - 10:  Problem: Cholelithiasis. Plan; LFT trending Up so will hold statin and get US RUQ.

## 2020-08-28 NOTE — PROGRESS NOTE ADULT - SUBJECTIVE AND OBJECTIVE BOX
INTERVAL HPI/OVERNIGHT EVENTS: i feel fine and sitting in chair.   Vital Signs Last 24 Hrs  T(C): 36.8 (28 Aug 2020 17:20), Max: 37.1 (27 Aug 2020 20:41)  T(F): 98.3 (28 Aug 2020 17:20), Max: 98.7 (27 Aug 2020 20:41)  HR: 81 (28 Aug 2020 17:20) (73 - 87)  BP: 122/71 (28 Aug 2020 17:20) (92/63 - 122/71)  BP(mean): --  RR: 18 (28 Aug 2020 17:20) (16 - 18)  SpO2: 97% (28 Aug 2020 17:20) (93% - 98%)  I&O's Summary    27 Aug 2020 07:01  -  28 Aug 2020 07:00  --------------------------------------------------------  IN: 420 mL / OUT: 370 mL / NET: 50 mL    28 Aug 2020 07:01  -  28 Aug 2020 20:09  --------------------------------------------------------  IN: 1470 mL / OUT: 0 mL / NET: 1470 mL      MEDICATIONS  (STANDING):  atorvastatin 20 milliGRAM(s) Oral at bedtime  calcitriol   Capsule 0.25 MICROGram(s) Oral daily  carvedilol 6.25 milliGRAM(s) Oral every 12 hours  cefepime   IVPB 1000 milliGRAM(s) IV Intermittent every 24 hours  colchicine 0.3 milliGRAM(s) Oral daily  dipyridamole 200 mG/aspirin 25 mG 1 Capsule(s) Oral two times a day  heparin   Injectable 5000 Unit(s) SubCutaneous every 12 hours  sodium chloride 0.45% 1000 milliLiter(s) (100 mL/Hr) IV Continuous <Continuous>    MEDICATIONS  (PRN):  acetaminophen   Tablet .. 650 milliGRAM(s) Oral every 6 hours PRN Temp greater or equal to 38C (100.4F), Moderate Pain (4 - 6)    LABS:                        10.0   4.73  )-----------( 95       ( 28 Aug 2020 06:44 )             30.5     08-28    137  |  100  |  68<H>  ----------------------------<  94  3.9   |  20<L>  |  4.60<H>    Ca    8.1<L>      28 Aug 2020 06:44    TPro  6.0  /  Alb  3.2<L>  /  TBili  0.8  /  DBili  x   /  AST  315<H>  /  ALT  282<H>  /  AlkPhos  100  08-28        CAPILLARY BLOOD GLUCOSE              REVIEW OF SYSTEMS:  CONSTITUTIONAL: No fever, weight loss, or fatigue  EYES: No eye pain, visual disturbances, or discharge  ENMT:  No difficulty hearing, tinnitus, vertigo; No sinus or throat pain  NECK: No pain or stiffness  RESPIRATORY: No cough, wheezing, chills or hemoptysis; No shortness of breath  CARDIOVASCULAR: No chest pain, palpitations, dizziness, or leg swelling  GASTROINTESTINAL: No abdominal or epigastric pain. No nausea, vomiting, or hematemesis; No diarrhea or constipation. No melena or hematochezia.  GENITOURINARY: No dysuria, frequency, hematuria, or incontinence  NEUROLOGICAL: No headaches, memory loss, loss of strength, numbness, or tremors      Consultant(s) Notes Reviewed:  [x ] YES  [ ] NO    PHYSICAL EXAM:  GENERAL: NAD, well-groomed, well-developed, not in any distress ,  HEAD:  Atraumatic, Normocephalic  EYES: EOMI, PERRLA, conjunctiva and sclera clear  ENMT: No tonsillar erythema, exudates, or enlargement; Moist mucous membranes, Good dentition, No lesions  NECK: Supple, No JVD, Normal thyroid  NERVOUS SYSTEM:  Alert & Oriented X3, No focal deficit   CHEST/LUNG: Good air entry bilateral with no  rales, rhonchi, wheezing, or rubs  HEART: Regular rate and rhythm; No murmurs, rubs, or gallops  ABDOMEN: Soft, Nontender, Nondistended; Bowel sounds present  EXTREMITIES:   No clubbing, cyanosis, or edema  \    Care Discussed with Consultants/Other Providers [ x] YES  [ ] NO

## 2020-08-28 NOTE — CONSULT NOTE ADULT - SUBJECTIVE AND OBJECTIVE BOX
Surgery Consult Note  Pager     HPI:  80yom pmhx of HTN, HLD, prostate CA presented with fevers and weakness, found to have rising transaminitis on labs. General surgery consulted to evaluate for biliary etiology of abnormal labs. Patient initially complained of left leg pain and left flank pain. Denies nausea/vomiting, right sided abdominal pain.       PAST MEDICAL & SURGICAL HISTORY:  Gout  PC (prostate cancer): s/p surgical resection 3 years ago  Hypercholesterolemia  HTN - Hypertension  H/O carotid endarterectomy  H/O prostatectomy      ALLERGIES:  NKA      HOME MEDICATIONS:  aspirin-dipyridamole 25 mg-200 mg oral capsule, extended release: 1 cap(s) orally 2 times a day (26 Aug 2020 19:04)  calcitriol 0.25 mcg oral capsule: 1 cap(s) orally 3 times a week  note: last dispensed March 2020 as 90 day supply (26 Aug 2020 19:04)  Colcrys 0.6 mg oral tablet: 1 tab(s) orally once a day (26 Aug 2020 19:04)  Coreg 12.5 mg oral tablet: 1 tab(s) orally once a day (26 Aug 2020 19:04)  losartan 100 mg oral tablet: 1 tab(s) orally once a day (26 Aug 2020 19:04)  rosuvastatin 10 mg oral tablet: 1 tab(s) orally once a day (26 Aug 2020 19:04)  traMADol-acetaminophen 37.5mg-325mg oral tablet: 2 tab(s) orally 2 times a day, As Needed (26 Aug 2020 19:04)  Tylenol: as needed (26 Aug 2020 19:04)    MEDICATIONS  (STANDING):  atorvastatin 20 milliGRAM(s) Oral at bedtime  calcitriol   Capsule 0.25 MICROGram(s) Oral daily  carvedilol 6.25 milliGRAM(s) Oral every 12 hours  cefepime   IVPB 1000 milliGRAM(s) IV Intermittent every 24 hours  colchicine 0.3 milliGRAM(s) Oral daily  dipyridamole 200 mG/aspirin 25 mG 1 Capsule(s) Oral two times a day  heparin   Injectable 5000 Unit(s) SubCutaneous every 12 hours  sodium chloride 0.45% 1000 milliLiter(s) (100 mL/Hr) IV Continuous <Continuous>      SOCIAL HISTORY:  Denies smoking and ETOH use. Lives with family.    FAMILY HISTORY:  No pertinent history in first degree relatives.  ___________________________________________  REVIEW OF SYSTEMS:  Constitutional: No fevers, chills, no recent weight loss  ENMT: No changes in hearing, no changes in vision, no sore throat, no cough  Respiratory: No shortness of breath  Cardiovascular: No chest pain, palpitations  Gastrointestinal: No abdominal pain, no diarrhea/constipation  Genitourinary: No dysuria, frequency, or urgency    Extremities: No joint swelling, no limited range of movement  Neurological: No paresthesia  Skin: No rashes  ___________________________________________  PHYSICAL EXAM:  Vital Signs Last 24 Hrs  T(C): 36.8 (28 Aug 2020 17:20), Max: 37.1 (28 Aug 2020 08:26)  T(F): 98.3 (28 Aug 2020 17:20), Max: 98.7 (28 Aug 2020 08:26)  HR: 81 (28 Aug 2020 17:20) (73 - 87)  BP: 122/71 (28 Aug 2020 17:20) (94/59 - 122/71)  BP(mean): --  RR: 18 (28 Aug 2020 17:20) (17 - 18)  SpO2: 97% (28 Aug 2020 17:20) (93% - 98%)CAPILLARY BLOOD GLUCOSE        I&O's Detail    27 Aug 2020 07:01  -  28 Aug 2020 07:00  --------------------------------------------------------  IN:    Oral Fluid: 370 mL    sodium chloride 0.45%: 50 mL  Total IN: 420 mL    OUT:    Voided: 370 mL  Total OUT: 370 mL    Total NET: 50 mL      28 Aug 2020 07:01  -  28 Aug 2020 21:08  --------------------------------------------------------  IN:    IV PiggyBack: 50 mL    Oral Fluid: 720 mL    sodium chloride 0.45%: 200 mL    sodium chloride 0.45%: 500 mL  Total IN: 1470 mL    OUT:  Total OUT: 0 mL    Total NET: 1470 mL      General: A&Ox3, NAD.  Neuro: Motor and sensory grossly intact with no focal deficits.  HEENT: Anicteric sclerae.  Respiratory: Unlabored breathing.   CVS: Regular rate and rhythm.  Abdomen: Soft, obese abdomen. Nontender.   Extremities: Warm bilaterally w/ palpable pulses.   MSK: Intact ROM.  ____________________________________________  LABS:  CBC Full  -  ( 28 Aug 2020 06:44 )  WBC Count : 4.73 K/uL  RBC Count : 2.78 M/uL  Hemoglobin : 10.0 g/dL  Hematocrit : 30.5 %  Platelet Count - Automated : 95 K/uL  Mean Cell Volume : 109.7 fl  Mean Cell Hemoglobin : 36.0 pg  Mean Cell Hemoglobin Concentration : 32.8 gm/dL  Auto Neutrophil # : x  Auto Lymphocyte # : x  Auto Monocyte # : x  Auto Eosinophil # : x  Auto Basophil # : x  Auto Neutrophil % : x  Auto Lymphocyte % : x  Auto Monocyte % : x  Auto Eosinophil % : x  Auto Basophil % : x    08-28    137  |  100  |  68<H>  ----------------------------<  94  3.9   |  20<L>  |  4.60<H>    Ca    8.1<L>      28 Aug 2020 06:44    TPro  6.0  /  Alb  3.2<L>  /  TBili  0.8  /  DBili  x   /  AST  315<H>  /  ALT  282<H>  /  AlkPhos  100  08-28    LIVER FUNCTIONS - ( 28 Aug 2020 06:44 )  Alb: 3.2 g/dL / Pro: 6.0 g/dL / ALK PHOS: 100 U/L / ALT: 282 U/L / AST: 315 U/L / GGT: x               CARDIAC MARKERS ( 28 Aug 2020 06:44 )  x     / x     / 138 U/L / x     / x          ____________________________________________  RADIOLOGY:  CT Abdomen and Pelvis No Cont (08.26.20 @ 19:07)   CHEST:  LUNGS AND LARGE AIRWAYS: Patent central airways. Left lower lobe consolidation.  PLEURA: No pleural effusion.  VESSELS: Atherosclerotic changes of the aorta and coronary arteries.  HEART: Heart size is normal. No pericardial effusion.  MEDIASTINUM AND CARLTON: No lymphadenopathy.  CHEST WALL AND LOWER NECK: Within normal limits.    ABDOMEN AND PELVIS:  LIVER: Within normal limits.  BILE DUCTS: Normal caliber.  GALLBLADDER: Cholelithiasis.  SPLEEN: Within normal limits.  PANCREAS: Within normal limits.  ADRENALS: Within normal limits.  KIDNEYS/URETERS: Atrophic right kidney. Left renal cyst.    BLADDER: Contains a focus of air. Correlate for recent instrumentation.  REPRODUCTIVE ORGANS: Prostate not visualized. Correlate with surgical history.    BOWEL: Colonic diverticulosis without diverticulitis. Nobowel obstruction. Appendix is normal.  PERITONEUM: No ascites.  VESSELS: Atherosclerotic changes.  RETROPERITONEUM/LYMPH NODES: No lymphadenopathy.  ABDOMINAL WALL: Within normal limits.  BONES: Degenerative changes.    IMPRESSION:  Lower lobe consolidation concerning for pneumonia.    No acute pathology in the abdomen or pelvis.

## 2020-08-28 NOTE — PHYSICAL THERAPY INITIAL EVALUATION ADULT - ADDITIONAL COMMENTS
per 's note: Pt states he lives alone in a private home with 4 steps to enter and 1 flight of stairs inside home and states he was independent in ADLs prior to hospitalization and uses a cane/walker as needed. per pt: lives alone in private house with 6 steps to enter with bilateral rails, 1 flight inside with 1 rail, right hand dominant, sometimes uses a straight cane for ambulation, +glasses for distance    per 's note: Pt states he lives alone in a private home with 4 steps to enter and 1 flight of stairs inside home and states he was independent in ADLs prior to hospitalization and uses a cane/walker as needed.

## 2020-08-28 NOTE — PROGRESS NOTE ADULT - SUBJECTIVE AND OBJECTIVE BOX
Overnight events noted      VITAL:  T(C): , Max: 38.6 (20 @ 17:00)  T(F): , Max: 101.5 (20 @ 17:00)  HR: 73 (20 @ 13:11)  BP: 108/64 (20 @ 13:11)  BP(mean): --  RR: 18 (20 @ 13:11)  SpO2: 93% (20 @ 13:11)      PHYSICAL EXAM:  Constitutional: NAD, Alert  HEENT: NCAT, DMM  Neck: Supple, No JVD  Respiratory: CTA-b/l  Cardiovascular: RRR s1s2, no m/r/g  Gastrointestinal: BS+, soft, NT/ND  Extremities: No peripheral edema b/l  Neurological: no focal deficits; strength grossly intact  Back: no CVAT b/l  Skin: No rashes, no nevi    LABS:                        10.0   4.73  )-----------( 95       ( 28 Aug 2020 06:44 )             30.5     Na(137)/K(3.9)/Cl(100)/HCO3(20)/BUN(68)/Cr(4.60)Glu(94)/Ca(8.1)/Mg(--)/PO4(--)     @ 06:44  Na(136)/K(4.0)/Cl(102)/HCO3(19)/BUN(47)/Cr(3.00)Glu(120)/Ca(8.3)/Mg(--)/PO4(--)     @ 07:06  Na(136)/K(4.0)/Cl(102)/HCO3(19)/BUN(44)/Cr(2.61)Glu(217)/Ca(8.5)/Mg(--)/PO4(--)     @ 17:22    Urinalysis Basic - ( 26 Aug 2020 17:22 )  Color: Yellow / Appearance: Slightly Turbid / S.018 / pH: x  Gluc: x / Ketone: Negative  / Bili: Negative / Urobili: Negative   Blood: x / Protein: 300 mg/dL / Nitrite: Negative   Leuk Esterase: Negative / RBC: 2 /hpf / WBC 5 /HPF   Sq Epi: x / Non Sq Epi: 5 / Bacteria: Negative  Creatinine, Random Urine: 95 mg/dL ( @ 00:15)  Protein/Creatinine Ratio Calculation: 1.0 Ratio ( @ 00:15)      IMPRESSION: 80M w/ HTN, gout, PAD, and prostate CA s/p resection, 20 a/w PNA    (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    (2)JUAN RAMON -  unclear etiology - numbers rapidly worsening as of today, despite IVF yesterday. Numbers worsening too quickly for RPGN to be high on the differential here. His SBP (90s-110s) is significantly lower than it was upon admission (130s-140s). Seems most likely that his JUAN RAMON is hemodynamically mediated (prerenal azotemia and/or ischemic ATN). Given that he has only 1 functioning kidney, and given that his creatinine appears to start rising after admission (and potentially after the CT from admission), I would look to perform a renal ultrasound at this point to rule out obstruction on the left. Performance of renal artery/venous dopplers could be worthwhile here as well if the creatinine continues to climb; he has PAD and is at risk for acute disease of the renal vasculature as well. Is Cefepime causing JUAN RAMON here? Seems unlikely, but we check (another) UA to rule out AIN (ie large increase in urine WBC)      RECOMMEND:  (1)Continue IVF - 1/2NS+75meq/L NaHCO3, 50cc/h   (2)BMP+Mg+PO4 daily  (3)Check seurm uric acid in a.m.  (4)Urine: UA(repeat- for WBC/to rule out AIN) and urine lytes+creat  (5)Renal ultrasound+renal art/vein dopplers  (6)Hold antihypertensives for SBP<120  (7)Dose meds for GFR <15 (cefepime dosing is ok)            Edis Cabral MD  [facility]  Office: [phone]  Cell: [phone] (+)diarrhea; no pain. No SOB. Reduced urine output, per patient.     VITAL:  T(C): , Max: 38.6 (20 @ 17:00)  T(F): , Max: 101.5 (20 @ 17:00)  HR: 73 (20 @ 13:11)  BP: 108/64 (20 @ 13:11)  RR: 18 (20 @ 13:11)  SpO2: 93% (20 @ 13:11)      PHYSICAL EXAM:  Constitutional: NAD, Alert  HEENT: NCAT, DMM  Neck: Supple, No JVD  Respiratory: CTA-b/l  Cardiovascular: RRR s1s2, no m/r/g  Gastrointestinal: BS+, soft, NT/ND  Extremities: No peripheral edema b/l  Neurological: no focal deficits; strength grossly intact  Back: no CVAT b/l  Skin: No rashes, no nevi    LABS:                        10.0   4.73  )-----------( 95       ( 28 Aug 2020 06:44 )             30.5     Na(137)/K(3.9)/Cl(100)/HCO3(20)/BUN(68)/Cr(4.60)Glu(94)/Ca(8.1)/Mg(--)/PO4(--)     @ 06:44  Na(136)/K(4.0)/Cl(102)/HCO3(19)/BUN(47)/Cr(3.00)Glu(120)/Ca(8.3)/Mg(--)/PO4(--)     @ 07:06  Na(136)/K(4.0)/Cl(102)/HCO3(19)/BUN(44)/Cr(2.61)Glu(217)/Ca(8.5)/Mg(--)/PO4(--)     @ 17:22    Urinalysis Basic - ( 26 Aug 2020 17:22 )  Color: Yellow / Appearance: Slightly Turbid / S.018 / pH: x  Gluc: x / Ketone: Negative  / Bili: Negative / Urobili: Negative   Blood: x / Protein: 300 mg/dL / Nitrite: Negative   Leuk Esterase: Negative / RBC: 2 /hpf / WBC 5 /HPF   Sq Epi: x / Non Sq Epi: 5 / Bacteria: Negative  Creatinine, Random Urine: 95 mg/dL ( @ 00:15)  Protein/Creatinine Ratio Calculation: 1.0 Ratio ( @ 00:15)      IMPRESSION: 80M w/ HTN, gout, PAD, and prostate CA s/p resection, 20 a/w PNA    (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    (2)JUAN RAMON -  unclear etiology - numbers rapidly worsening as of today, despite IVF yesterday. Numbers worsening too quickly for RPGN to be high on the differential here. His SBP (90s-110s) is significantly lower than it was upon admission (130s-140s). Seems most likely that his JUAN RAMON is hemodynamically mediated (prerenal azotemia and/or ischemic ATN). Given that he has only 1 functioning kidney, and given that his creatinine appears to start rising after admission (and potentially after the CT from admission), I would look to perform a renal ultrasound at this point to rule out obstruction on the left. Performance of renal artery/venous dopplers could be worthwhile here as well if the creatinine continues to climb; he has PAD and is at risk for acute disease of the renal vasculature as well. Is Cefepime causing JUAN RAMON here? Seems unlikely, but we check (another) UA to rule out AIN (ie large increase in urine WBC)      RECOMMEND:  (1)Increase IVF (1/2NS+75meq/L NaHCO3) from 50cc/h to 100cc/h   (2)BMP+Mg+PO4 daily  (3)Check seurm uric acid in a.m.  (4)Urine: UA(repeat- for WBC/to rule out AIN) and urine lytes+creat  (5)Renal ultrasound+renal art/vein dopplers  (6)Hold antihypertensives for SBP<120  (7)Dose meds for GFR <15 (cefepime dosing is ok)            Edis Cabral MD  [facility]  Office: [phone]  Cell: [phone]

## 2020-08-28 NOTE — PROGRESS NOTE ADULT - ASSESSMENT
79 y/o M with PMH of HTN, HLD, prostate CA, PVD. Presents to ED with weakness, L leg pain and fever for past 2 days. Unable to get off the couch since yesterday. Seen by PMD this week for arthritis and started on Tramadol. Found to have LLL consolidation concerning for PNA.

## 2020-08-28 NOTE — PROGRESS NOTE ADULT - SUBJECTIVE AND OBJECTIVE BOX
80y old  Male who presents with a chief complaint of weakness and fever (28 Aug 2020 13:53)      Interval history:  Febrile yesterday, no cough, no SOB, no chest pain. Lt thigh and calf pain.       Allergies:   No Known Allergies      Antimicrobials:  azithromycin  IVPB 500 milliGRAM(s) IV Intermittent every 24 hours  cefepime   IVPB 1000 milliGRAM(s) IV Intermittent every 24 hours      REVIEW OF SYSTEMS:  No N/V/D, no abdominal pain  No dysuria  No rash.       Vital Signs Last 24 Hrs  T(C): 36.7 (08-28-20 @ 13:11), Max: 38.6 (08-27-20 @ 17:00)  T(F): 98.1 (08-28-20 @ 13:11), Max: 101.5 (08-27-20 @ 17:00)  HR: 73 (08-28-20 @ 13:11) (73 - 87)  BP: 108/64 (08-28-20 @ 13:11) (92/63 - 114/57)  BP(mean): --  RR: 18 (08-28-20 @ 13:11) (16 - 18)  SpO2: 93% (08-28-20 @ 13:11) (93% - 98%)      PHYSICAL EXAM:  Patient in no acute distress. AAOX3.  No icterus, no oral ulcers.  Cardiovascular: S1S2 normal.  Lungs: rales lt lung base.  Gastrointestinal: soft, nontender, nondistended.  Extremities: no edema. Induration lt thigh, no warmth or erythema.   IV sites not inflamed.                             10.0   4.73  )-----------( 95       ( 28 Aug 2020 06:44 )             30.5   08-28    137  |  100  |  68<H>  ----------------------------<  94  3.9   |  20<L>  |  4.60<H>    Ca    8.1<L>      28 Aug 2020 06:44    TPro  6.0  /  Alb  3.2<L>  /  TBili  0.8  /  DBili  x   /  AST  315<H>  /  ALT  282<H>  /  AlkPhos  100  08-28      LIVER FUNCTIONS - ( 28 Aug 2020 06:44 )  Alb: 3.2 g/dL / Pro: 6.0 g/dL / ALK PHOS: 100 U/L / ALT: 282 U/L / AST: 315 U/L / GGT: x             Culture - Urine (collected 27 Aug 2020 00:37)  Source: .Urine Clean Catch (Midstream)  Final Report (27 Aug 2020 20:27):    No growth    Culture - Blood (collected 26 Aug 2020 23:06)  Source: .Blood Blood-Peripheral  Preliminary Report (28 Aug 2020 01:02):    No growth to date.    Culture - Blood (collected 26 Aug 2020 23:06)  Source: .Blood Blood-Peripheral  Preliminary Report (28 Aug 2020 01:02):    No growth to date.      Radiology:  < from: CT Chest No Cont (08.26.20 @ 19:07) >  IMPRESSION:  Lower lobe consolidation concerning for pneumonia.    No acute pathology in the abdomen or pelvis. 80y old  Male who presents with a chief complaint of weakness and fever (28 Aug 2020 13:53)      Interval history:  Febrile yesterday, no cough, no SOB, no chest pain. Lt thigh and calf pain.       Allergies:   No Known Allergies      Antimicrobials:  azithromycin  IVPB 500 milliGRAM(s) IV Intermittent every 24 hours  cefepime   IVPB 1000 milliGRAM(s) IV Intermittent every 24 hours      REVIEW OF SYSTEMS:  No N/V/D, no abdominal pain  No dysuria  No rash.       Vital Signs Last 24 Hrs  T(C): 36.7 (08-28-20 @ 13:11), Max: 38.6 (08-27-20 @ 17:00)  T(F): 98.1 (08-28-20 @ 13:11), Max: 101.5 (08-27-20 @ 17:00)  HR: 73 (08-28-20 @ 13:11) (73 - 87)  BP: 108/64 (08-28-20 @ 13:11) (92/63 - 114/57)  BP(mean): --  RR: 18 (08-28-20 @ 13:11) (16 - 18)  SpO2: 93% (08-28-20 @ 13:11) (93% - 98%)      PHYSICAL EXAM:  Patient in no acute distress. Alert, awake, sitting in chair   No icterus, no oral ulcers.  Cardiovascular: S1S2 normal.  Lungs: rales lt lung base.  Gastrointestinal: soft, nontender, nondistended.  Extremities: no edema. Induration lt thigh, no warmth or erythema.   IV sites not inflamed.                             10.0   4.73  )-----------( 95       ( 28 Aug 2020 06:44 )             30.5   08-28    137  |  100  |  68<H>  ----------------------------<  94  3.9   |  20<L>  |  4.60<H>    Ca    8.1<L>      28 Aug 2020 06:44    TPro  6.0  /  Alb  3.2<L>  /  TBili  0.8  /  DBili  x   /  AST  315<H>  /  ALT  282<H>  /  AlkPhos  100  08-28      LIVER FUNCTIONS - ( 28 Aug 2020 06:44 )  Alb: 3.2 g/dL / Pro: 6.0 g/dL / ALK PHOS: 100 U/L / ALT: 282 U/L / AST: 315 U/L / GGT: x             Culture - Urine (collected 27 Aug 2020 00:37)  Source: .Urine Clean Catch (Midstream)  Final Report (27 Aug 2020 20:27):    No growth    Culture - Blood (collected 26 Aug 2020 23:06)  Source: .Blood Blood-Peripheral  Preliminary Report (28 Aug 2020 01:02):    No growth to date.    Culture - Blood (collected 26 Aug 2020 23:06)  Source: .Blood Blood-Peripheral  Preliminary Report (28 Aug 2020 01:02):    No growth to date.       Radiology: Imaging reviewed personally and interpretation as mentioned below.     < from: CT Chest No Cont (08.26.20 @ 19:07) >  IMPRESSION:  Lower lobe consolidation concerning for pneumonia.    No acute pathology in the abdomen or pelvis.

## 2020-08-28 NOTE — PROGRESS NOTE ADULT - SUBJECTIVE AND OBJECTIVE BOX
Vascular Surgery Team Daily Progress Note    SUBJECTIVE: Patient seen and examined during the morning round, no over night event, no acute complaint.     OBJECTIVE:   Vital Signs Last 24 Hrs  T(C): 36.8 (28 Aug 2020 05:23), Max: 38.6 (27 Aug 2020 17:00)  T(F): 98.3 (28 Aug 2020 05:23), Max: 101.5 (27 Aug 2020 17:00)  HR: 79 (28 Aug 2020 05:23) (74 - 86)  BP: 114/57 (28 Aug 2020 05:23) (92/63 - 114/57)  BP(mean): --  RR: 17 (28 Aug 2020 05:23) (16 - 18)  SpO2: 98% (28 Aug 2020 05:23) (91% - 98%)    PHYSICAL EXAM:  Constitutional: resting in bed with no acute distress  Respiratory:  unlabored breathing  Gastrointestinal: Abdomen soft, non distended, non tenderness   Extremities:  No edema, no calf tenderness; left DP palpable, rest pedal pulse signal only     RADIOLOGY & ADDITIONAL STUDIES:   EXAM:  PHYSIOL EXTREM LOW 3+ LEV BI                        PROCEDURE DATE:  08/27/2020      IMPRESSION:  Left: There is mild left-sided left femoral-popliteal disease. There is more severe disease noted at the left trifurcation.  Right: There is moderate right-sided femoral-popliteal disease. Moderate to severe right trifurcation disease is noted.  The right ankle-brachial index could not be obtained the left ankle-brachial index is erroneously elevated. Noncompressibility of the arteries of the right and left lower extremities limits this examination. Vascular Surgery Team Daily Progress Note    SUBJECTIVE: Patient seen and examined during the morning round, no over night event, no acute complaint.     OBJECTIVE:   Vital Signs Last 24 Hrs  T(C): 36.8 (28 Aug 2020 05:23), Max: 38.6 (27 Aug 2020 17:00)  T(F): 98.3 (28 Aug 2020 05:23), Max: 101.5 (27 Aug 2020 17:00)  HR: 79 (28 Aug 2020 05:23) (74 - 86)  BP: 114/57 (28 Aug 2020 05:23) (92/63 - 114/57)  BP(mean): --  RR: 17 (28 Aug 2020 05:23) (16 - 18)  SpO2: 98% (28 Aug 2020 05:23) (91% - 98%)    PHYSICAL EXAM:  Constitutional: resting in bed with no acute distress  Respiratory:  unlabored breathing  Gastrointestinal: Abdomen soft, non distended, non tenderness   Extremities:  No edema, no calf tenderness; left DP palpable, rest pedal pulse signal only     RADIOLOGY & ADDITIONAL STUDIES:   EXAM:  PHYSIOL EXTREM LOW 3+ LEV BI                        PROCEDURE DATE:  08/27/2020      IMPRESSION:  Left: There is mild left-sided left femoral-popliteal disease. There is more severe disease noted at the left trifurcation.  Right: There is moderate right-sided femoral-popliteal disease. Moderate to severe right trifurcation disease is noted.  The right ankle-brachial index could not be obtained the left ankle-brachial index is erroneously elevated. Noncompressibility of the arteries of the right and left lower extremities limits this examination.      CHER/PVR reviewed

## 2020-08-28 NOTE — PHYSICAL THERAPY INITIAL EVALUATION ADULT - PLANNED THERAPY INTERVENTIONS, PT EVAL
gait training/stair training: GOAL: (to be met in 4 wks) negotiate 1 flight of stairs with 1 rail and appropriate assistive device with step to step pattern independently/balance training/bed mobility training/strengthening/transfer training

## 2020-08-28 NOTE — PROGRESS NOTE ADULT - ASSESSMENT
81yo M pmhx of HTN, HLD, TIA/possible stroke in 2012 s/p carotid endarterectomy, past inferior MI ,prostate CA s/p prostatectomy, OA of hips and legs, CKD3, gout, presented with LLE pain and difficulty ambulating.     Plan:  - CHER/PVR completed with detailed report above, there is evidence of both femoral-popliteal as well as trifurcation disease. Patient may benefit from angiogram with intervention, though the option is limited given patient's CKD. Would recommend medical management with antiplatelet and statin as well as symptomatic management of claudication with trental   - plan discussed with Dr. Alarcon 79yo M pmhx of HTN, HLD, TIA/possible stroke in 2012 s/p carotid endarterectomy, past inferior MI ,prostate CA s/p prostatectomy, OA of hips and legs, CKD3, gout, presented with LLE pain and difficulty ambulating.     Plan:  - given pt's renal insuff  will hold off on le angio   recommend med/conserv managemnt   recommend pletal 50 bid and if pt is not cleared then recommend trental 400 tid  will follow

## 2020-08-28 NOTE — CONSULT NOTE ADULT - ASSESSMENT
Assessment/Plan: 80y Male presents with weakness found to have transaminitis.   Denies abdominal pain currently.   No signs of biliary obstruction on exam or labs.  Has gallstones on CT.     - RUQ US to evaluate for gallbladder etiology of abnormal LFTs  - If US does not show signs of cholecystitis, defer to GI for further workup     Seen with Dr. Garcia  Pager [phone] Assessment/Plan: 80y Male presents with weakness found to have transaminitis.   Denies abdominal pain currently.   No signs of biliary obstruction on exam or labs.  Has gallstones on CT.     - RUQ US to evaluate for gallbladder etiology of abnormal LFTs  - If US does not show signs of cholecystitis, defer to GI for further workup, ?MRCP    Seen with Dr. Garcia  Pager [phone]

## 2020-08-28 NOTE — PROGRESS NOTE ADULT - SUBJECTIVE AND OBJECTIVE BOX
Follow-up Pulm Progress Note    No new respiratory events overnight.  Denies SOB/CP.     Medications:  MEDICATIONS  (STANDING):  atorvastatin 20 milliGRAM(s) Oral at bedtime  azithromycin  IVPB      azithromycin  IVPB 500 milliGRAM(s) IV Intermittent every 24 hours  calcitriol   Capsule 0.25 MICROGram(s) Oral daily  carvedilol 6.25 milliGRAM(s) Oral every 12 hours  cefepime   IVPB 1000 milliGRAM(s) IV Intermittent every 24 hours  colchicine 0.3 milliGRAM(s) Oral daily  dipyridamole 200 mG/aspirin 25 mG 1 Capsule(s) Oral two times a day  heparin   Injectable 5000 Unit(s) SubCutaneous every 12 hours  sodium chloride 0.45% 1000 milliLiter(s) (50 mL/Hr) IV Continuous <Continuous>    MEDICATIONS  (PRN):  acetaminophen   Tablet .. 650 milliGRAM(s) Oral every 6 hours PRN Temp greater or equal to 38C (100.4F), Moderate Pain (4 - 6)          Vital Signs Last 24 Hrs  T(C): 36.7 (28 Aug 2020 13:11), Max: 38.6 (27 Aug 2020 17:00)  T(F): 98.1 (28 Aug 2020 13:11), Max: 101.5 (27 Aug 2020 17:00)  HR: 73 (28 Aug 2020 13:11) (73 - 87)  BP: 108/64 (28 Aug 2020 13:11) (92/63 - 114/57)  BP(mean): --  RR: 18 (28 Aug 2020 13:11) (16 - 18)  SpO2: 93% (28 Aug 2020 13:11) (93% - 98%)      VBG pH --  @ 17:22    VBG pCO2 --  @ 17:22    VBG O2 sat --  @ 17:22    VBG lactate 1.8  @ 17:22       @ 07:01  -   @ 07:00  --------------------------------------------------------  IN: 420 mL / OUT: 370 mL / NET: 50 mL          LABS:                        10.0   4.73  )-----------( 95       ( 28 Aug 2020 06:44 )             30.5         137  |  100  |  68<H>  ----------------------------<  94  3.9   |  20<L>  |  4.60<H>    Ca    8.1<L>      28 Aug 2020 06:44    TPro  6.0  /  Alb  3.2<L>  /  TBili  0.8  /  DBili  x   /  AST  315<H>  /  ALT  282<H>  /  AlkPhos  100        CARDIAC MARKERS ( 28 Aug 2020 06:44 )  x     / x     / 138 U/L / x     / x      CARDIAC MARKERS ( 26 Aug 2020 17:22 )  x     / x     / 322 U/L / x     / x          CAPILLARY BLOOD GLUCOSE        PT/INR - ( 26 Aug 2020 17:22 )   PT: 17.2 sec;   INR: 1.47 ratio         PTT - ( 26 Aug 2020 17:22 )  PTT:27.0 sec  Urinalysis Basic - ( 26 Aug 2020 17:22 )    Color: Yellow / Appearance: Slightly Turbid / S.018 / pH: x  Gluc: x / Ketone: Negative  / Bili: Negative / Urobili: Negative   Blood: x / Protein: 300 mg/dL / Nitrite: Negative   Leuk Esterase: Negative / RBC: 2 /hpf / WBC 5 /HPF   Sq Epi: x / Non Sq Epi: 5 / Bacteria: Negative            CULTURES:     Culture - Blood (collected 20 @ 23:06)  Source: .Blood Blood-Peripheral  Preliminary Report (20 @ 01:02):    No growth to date.    Culture - Blood (collected 20 @ 23:06)  Source: .Blood Blood-Peripheral  Preliminary Report (20 @ 01:02):    No growth to date.        Culture - Urine (collected 20 @ 00:37)  Source: .Urine Clean Catch (Midstream)  Final Report (20 @ 20:27):    No growth        Physical Examination:  PULM: crackles L base  CVS: RRR    RADIOLOGY REVIEWED  CT chest: < from: CT Chest No Cont (20 @ 19:07) >  FINDINGS:  CHEST:  LUNGS AND LARGE AIRWAYS: Patent central airways. Left lower lobe consolidation.  PLEURA: No pleural effusion.  VESSELS: Atherosclerotic changes of the aorta and coronary arteries.  HEART: Heart size is normal. No pericardial effusion.  MEDIASTINUM AND CARLTON: No lymphadenopathy.  CHEST WALL AND LOWER NECK: Within normal limits.    ABDOMEN AND PELVIS:  LIVER: Within normal limits.  BILE DUCTS: Normal caliber.  GALLBLADDER: Cholelithiasis.  SPLEEN: Within normal limits.  PANCREAS: Within normal limits.  ADRENALS: Within normal limits.  KIDNEYS/URETERS: Atrophic right kidney. Left renal cyst.    BLADDER: Contains a focus of air. Correlate for recent instrumentation.  REPRODUCTIVE ORGANS: Prostate not visualized. Correlate with surgical history.    BOWEL: Colonic diverticulosis without diverticulitis. Nobowel obstruction. Appendix is normal.  PERITONEUM: No ascites.  VESSELS: Atherosclerotic changes.  RETROPERITONEUM/LYMPH NODES: No lymphadenopathy.  ABDOMINAL WALL: Within normal limits.  BONES: Degenerative changes.    IMPRESSION:  Lower lobe pneumonia.    No acute pathology in the abdomen or pelvis.      < end of copied text >

## 2020-08-28 NOTE — PROGRESS NOTE ADULT - ASSESSMENT
80 year old with history of prostate ca, UTIs, prostatectomy, presented with weakness, leg pain which seems to have resolved, not walking well, and currently complains only of left flank pain  Denies SOB, fever, chills     CT scan reviewed  suggested right lower pna but clinically presentation is not really suggestive of pna.  He has left flank pain but UA is only modestly abnormal and ct scan does not demonstrate obstruction.     It is not clear what is the problem ( pyelo, pna, or PVD).    check legionella ag.  change ceftriaxone to cefepime to cover gram negatives in urine better.   dc Zithromax if legionella is negative     await urine and bc.  attempt to walk ; no weakness on direct exam but he has not walked   ID to cover next 3 days . 80 year old with history of prostate ca, UTIs, prostatectomy, presented with weakness, leg pain which seems to have resolved, not walking well, and currently complains only of left flank pain  Denies SOB, fever, chills.     It is not clear what is the problem ( pyelo, pna, or PVD).  leg pain can be due to intermittent claudication from PVD but that does not explain fever.     Overall fever, tachy, sepsis/sirs, ? etiology       Plan:   changed ceftriaxone to cefepime to cover gram negatives better.  urine cx negative   blood cx NTD   legionella is negative, stopped alayna  reviewed CT with radiology 3 differentials are either COVID,  or pulm infarct   covid ab ordered  d dimer ordered  consider lt LE CT to ensure no collections.

## 2020-08-28 NOTE — PHYSICAL THERAPY INITIAL EVALUATION ADULT - PERTINENT HX OF CURRENT PROBLEM, REHAB EVAL
PMHx: HTN , HLD, prostate CA , gout, PVD, CKD III. brought by EMS for weakness & fever x2 days. Unable to get off the couch since yesterday. +incontinence of urine; seen by PMD this week for arthritis & started on Tramadol. +fever. CT chest 8/26: LLL consolidation concerning for PNA. LLE doppler: neg. Arterial insufficiency of lower extremity, Intermittent claudication due to atherosclerosis of artery of extremity

## 2020-08-29 LAB
ALBUMIN SERPL ELPH-MCNC: 3.4 G/DL — SIGNIFICANT CHANGE UP (ref 3.3–5)
ALP SERPL-CCNC: 169 U/L — HIGH (ref 40–120)
ALT FLD-CCNC: 510 U/L — HIGH (ref 10–45)
ANION GAP SERPL CALC-SCNC: 19 MMOL/L — HIGH (ref 5–17)
APPEARANCE UR: ABNORMAL
AST SERPL-CCNC: 468 U/L — HIGH (ref 10–40)
BACTERIA # UR AUTO: NEGATIVE — SIGNIFICANT CHANGE UP
BILIRUB SERPL-MCNC: 0.7 MG/DL — SIGNIFICANT CHANGE UP (ref 0.2–1.2)
BILIRUB UR-MCNC: NEGATIVE — SIGNIFICANT CHANGE UP
BUN SERPL-MCNC: 84 MG/DL — HIGH (ref 7–23)
CALCIUM SERPL-MCNC: 8.3 MG/DL — LOW (ref 8.4–10.5)
CHLORIDE SERPL-SCNC: 101 MMOL/L — SIGNIFICANT CHANGE UP (ref 96–108)
CO2 SERPL-SCNC: 21 MMOL/L — LOW (ref 22–31)
COLOR SPEC: YELLOW — SIGNIFICANT CHANGE UP
COMMENT - URINE: SIGNIFICANT CHANGE UP
CREAT SERPL-MCNC: 5.04 MG/DL — HIGH (ref 0.5–1.3)
D DIMER BLD IA.RAPID-MCNC: 1279 NG/ML DDU — HIGH
DIFF PNL FLD: ABNORMAL
EPI CELLS # UR: 1 /HPF — SIGNIFICANT CHANGE UP (ref 0–5)
GLUCOSE SERPL-MCNC: 95 MG/DL — SIGNIFICANT CHANGE UP (ref 70–99)
GLUCOSE UR QL: NEGATIVE — SIGNIFICANT CHANGE UP
HCT VFR BLD CALC: 29.3 % — LOW (ref 39–50)
HGB BLD-MCNC: 9.7 G/DL — LOW (ref 13–17)
HYALINE CASTS # UR AUTO: 1 /LPF — SIGNIFICANT CHANGE UP (ref 0–7)
KETONES UR-MCNC: NEGATIVE — SIGNIFICANT CHANGE UP
LEUKOCYTE ESTERASE UR-ACNC: NEGATIVE — SIGNIFICANT CHANGE UP
MAGNESIUM SERPL-MCNC: 2.7 MG/DL — HIGH (ref 1.6–2.6)
MCHC RBC-ENTMCNC: 33.1 GM/DL — SIGNIFICANT CHANGE UP (ref 32–36)
MCHC RBC-ENTMCNC: 36.3 PG — HIGH (ref 27–34)
MCV RBC AUTO: 109.7 FL — HIGH (ref 80–100)
NITRITE UR-MCNC: NEGATIVE — SIGNIFICANT CHANGE UP
NRBC # BLD: 0 /100 WBCS — SIGNIFICANT CHANGE UP (ref 0–0)
PH UR: 6 — SIGNIFICANT CHANGE UP (ref 5–8)
PHOSPHATE SERPL-MCNC: 4.8 MG/DL — HIGH (ref 2.5–4.5)
PLATELET # BLD AUTO: 109 K/UL — LOW (ref 150–400)
POTASSIUM SERPL-MCNC: 3.7 MMOL/L — SIGNIFICANT CHANGE UP (ref 3.5–5.3)
POTASSIUM SERPL-SCNC: 3.7 MMOL/L — SIGNIFICANT CHANGE UP (ref 3.5–5.3)
PROT SERPL-MCNC: 6.2 G/DL — SIGNIFICANT CHANGE UP (ref 6–8.3)
PROT UR-MCNC: ABNORMAL
RBC # BLD: 2.67 M/UL — LOW (ref 4.2–5.8)
RBC # FLD: 18.1 % — HIGH (ref 10.3–14.5)
RBC CASTS # UR COMP ASSIST: 1 /HPF — SIGNIFICANT CHANGE UP (ref 0–4)
SODIUM SERPL-SCNC: 141 MMOL/L — SIGNIFICANT CHANGE UP (ref 135–145)
SP GR SPEC: 1.01 — SIGNIFICANT CHANGE UP (ref 1.01–1.02)
URATE SERPL-MCNC: 11.7 MG/DL — HIGH (ref 3.4–8.8)
UROBILINOGEN FLD QL: SIGNIFICANT CHANGE UP
WBC # BLD: 4.4 K/UL — SIGNIFICANT CHANGE UP (ref 3.8–10.5)
WBC # FLD AUTO: 4.4 K/UL — SIGNIFICANT CHANGE UP (ref 3.8–10.5)
WBC UR QL: 4 /HPF — SIGNIFICANT CHANGE UP (ref 0–5)

## 2020-08-29 PROCEDURE — 99233 SBSQ HOSP IP/OBS HIGH 50: CPT

## 2020-08-29 RX ADMIN — CEFEPIME 100 MILLIGRAM(S): 1 INJECTION, POWDER, FOR SOLUTION INTRAMUSCULAR; INTRAVENOUS at 10:42

## 2020-08-29 RX ADMIN — ATORVASTATIN CALCIUM 20 MILLIGRAM(S): 80 TABLET, FILM COATED ORAL at 21:34

## 2020-08-29 RX ADMIN — ASPIRIN AND DIPYRIDAMOLE 1 CAPSULE(S): 25; 200 CAPSULE, EXTENDED RELEASE ORAL at 17:30

## 2020-08-29 RX ADMIN — HEPARIN SODIUM 5000 UNIT(S): 5000 INJECTION INTRAVENOUS; SUBCUTANEOUS at 05:26

## 2020-08-29 RX ADMIN — CARVEDILOL PHOSPHATE 6.25 MILLIGRAM(S): 80 CAPSULE, EXTENDED RELEASE ORAL at 05:26

## 2020-08-29 RX ADMIN — Medication 0.3 MILLIGRAM(S): at 12:49

## 2020-08-29 RX ADMIN — SODIUM CHLORIDE 100 MILLILITER(S): 9 INJECTION, SOLUTION INTRAVENOUS at 07:58

## 2020-08-29 RX ADMIN — HEPARIN SODIUM 5000 UNIT(S): 5000 INJECTION INTRAVENOUS; SUBCUTANEOUS at 17:30

## 2020-08-29 RX ADMIN — ASPIRIN AND DIPYRIDAMOLE 1 CAPSULE(S): 25; 200 CAPSULE, EXTENDED RELEASE ORAL at 05:26

## 2020-08-29 RX ADMIN — CALCITRIOL 0.25 MICROGRAM(S): 0.5 CAPSULE ORAL at 12:48

## 2020-08-29 RX ADMIN — CARVEDILOL PHOSPHATE 6.25 MILLIGRAM(S): 80 CAPSULE, EXTENDED RELEASE ORAL at 17:30

## 2020-08-29 NOTE — PROGRESS NOTE ADULT - ASSESSMENT
80yom pmhx of HTN HLD ,PVD prostate CA bib ems for weakness and fever for past 2 days. unable to get off the couch since yesterday. No chest pain no sob, no nausea or vomiting. +incontinence of urine; seen by PMD this week for arthritis and started on Tramadol.     Problem/Plan - 1:  ·  Problem: Sepsis.  Plan: Hemodynamically stable. S/P cultures . IV Abxs. ID help appreciated.     Clinically better.      Problem/Plan - 2:  ·  Problem: Pneumonia.  Plan: IV Abxs. Pulmonary following.      Problem/Plan - 3:  ·  Problem: Left leg pain with PVD .  Plan:  Vascular helping.      Problem/Plan - 4:  ·  Problem: CKD (chronic kidney disease) stage 3, GFR 30-59 ml/min with Metabolic Acidosis with JUAN RAMON  .  Plan: Renal following. BMP worsening.      Problem/Plan - 5:  ·  Problem: Hypertension.  Plan: BP meds with hold parameters.      Problem/Plan - 6:  Problem: Hypercholesterolemia. Plan: Statin.     Problem/Plan - 7:  ·  Problem: Gout.  Plan: Colchicine.      Problem/Plan - 8:  ·  Problem: Anemia, chronic disease with Thrombocytopenia .  Plan: Work up pending. Hematology consulted.      Problem/Plan - 9:  ·  Problem:  PC (prostate cancer).  Plan: Outpt follow up.      Problem/Plan - 10:  Problem: Cholelithiasis. Plan; LFT trending Up so will hold statin and getting  US RUQ.   Surgery consult noted.  May need MRI .

## 2020-08-29 NOTE — CONSULT NOTE ADULT - SUBJECTIVE AND OBJECTIVE BOX
80yom pmhx of HTN , HLD  ,prostate CA , PVD bib ems for weakness and fevers. He was noted to have possible PNA. Heme consulted for anemia and thrombocytopenia. Hgb about 9.7, plts 100-110s. Pt feeling fine, no complaints.    PAST MEDICAL & SURGICAL HISTORY:  Gout  PC (prostate cancer): s/p surgical resection 3 years ago  Hypercholesterolemia  HTN - Hypertension  H/O carotid endarterectomy  H/O prostatectomy      FAMILY HISTORY:  No pertinent family history in first degree relatives      Alochol: Denied  Smoking: Nonsmoker  Drug Use: Denied  Marital Status:         Allergies    No Known Allergies    Intolerances        MEDICATIONS  (STANDING):  atorvastatin 20 milliGRAM(s) Oral at bedtime  calcitriol   Capsule 0.25 MICROGram(s) Oral daily  carvedilol 6.25 milliGRAM(s) Oral every 12 hours  cefepime   IVPB 1000 milliGRAM(s) IV Intermittent every 24 hours  colchicine 0.3 milliGRAM(s) Oral daily  dipyridamole 200 mG/aspirin 25 mG 1 Capsule(s) Oral two times a day  heparin   Injectable 5000 Unit(s) SubCutaneous every 12 hours  sodium chloride 0.45% 1000 milliLiter(s) (100 mL/Hr) IV Continuous <Continuous>    MEDICATIONS  (PRN):  acetaminophen   Tablet .. 650 milliGRAM(s) Oral every 6 hours PRN Temp greater or equal to 38C (100.4F), Moderate Pain (4 - 6)      ROS  no pain, no bleeding, limited 2/2 participation    T(C): 36.9 (08-29-20 @ 13:59), Max: 37.1 (08-29-20 @ 01:01)  HR: 74 (08-29-20 @ 13:59) (62 - 81)  BP: 143/73 (08-29-20 @ 13:59) (103/58 - 144/77)  RR: 18 (08-29-20 @ 13:59) (18 - 20)  SpO2: 96% (08-29-20 @ 13:59) (95% - 97%)  Wt(kg): --    PE  NAD  Awake, alert                          9.7    4.40  )-----------( 109      ( 29 Aug 2020 07:07 )             29.3       08-29    141  |  101  |  84<H>  ----------------------------<  95  3.7   |  21<L>  |  5.04<H>    Ca    8.3<L>      29 Aug 2020 07:05  Phos  4.8     08-29  Mg     2.7     08-29    TPro  6.2  /  Alb  3.4  /  TBili  0.7  /  DBili  x   /  AST  468<H>  /  ALT  510<H>  /  AlkPhos  169<H>  08-29

## 2020-08-29 NOTE — PROGRESS NOTE ADULT - SUBJECTIVE AND OBJECTIVE BOX
INTERVAL HPI/OVERNIGHT EVENTS: I feel fine and ate well.   Vital Signs Last 24 Hrs  T(C): 36.9 (29 Aug 2020 13:59), Max: 37.1 (29 Aug 2020 01:01)  T(F): 98.5 (29 Aug 2020 13:59), Max: 98.7 (29 Aug 2020 01:01)  HR: 74 (29 Aug 2020 13:59) (62 - 81)  BP: 143/73 (29 Aug 2020 13:59) (103/58 - 144/77)  BP(mean): --  RR: 18 (29 Aug 2020 13:59) (18 - 20)  SpO2: 96% (29 Aug 2020 13:59) (95% - 97%)  I&O's Summary    28 Aug 2020 07:  -  29 Aug 2020 07:00  --------------------------------------------------------  IN: 2990 mL / OUT: 300 mL / NET: 2690 mL    29 Aug 2020 07:01  -  29 Aug 2020 17:03  --------------------------------------------------------  IN: 360 mL / OUT: 0 mL / NET: 360 mL      MEDICATIONS  (STANDING):  atorvastatin 20 milliGRAM(s) Oral at bedtime  calcitriol   Capsule 0.25 MICROGram(s) Oral daily  carvedilol 6.25 milliGRAM(s) Oral every 12 hours  cefepime   IVPB 1000 milliGRAM(s) IV Intermittent every 24 hours  colchicine 0.3 milliGRAM(s) Oral daily  dipyridamole 200 mG/aspirin 25 mG 1 Capsule(s) Oral two times a day  heparin   Injectable 5000 Unit(s) SubCutaneous every 12 hours  sodium chloride 0.45% 1000 milliLiter(s) (100 mL/Hr) IV Continuous <Continuous>    MEDICATIONS  (PRN):  acetaminophen   Tablet .. 650 milliGRAM(s) Oral every 6 hours PRN Temp greater or equal to 38C (100.4F), Moderate Pain (4 - 6)    LABS:                        9.7    4.40  )-----------( 109      ( 29 Aug 2020 07:07 )             29.3         141  |  101  |  84<H>  ----------------------------<  95  3.7   |  21<L>  |  5.04<H>    Ca    8.3<L>      29 Aug 2020 07:05  Phos  4.8       Mg     2.7         TPro  6.2  /  Alb  3.4  /  TBili  0.7  /  DBili  x   /  AST  468<H>  /  ALT  510<H>  /  AlkPhos  169<H>        Urinalysis Basic - ( 29 Aug 2020 00:30 )    Color: Yellow / Appearance: Slightly Turbid / S.014 / pH: x  Gluc: x / Ketone: Negative  / Bili: Negative / Urobili: <2 mg/dL   Blood: x / Protein: 100 mg/dL / Nitrite: Negative   Leuk Esterase: Negative / RBC: 1 /HPF / WBC 4 /HPF   Sq Epi: x / Non Sq Epi: 1 /HPF / Bacteria: Negative      CAPILLARY BLOOD GLUCOSE            Urinalysis Basic - ( 29 Aug 2020 00:30 )    Color: Yellow / Appearance: Slightly Turbid / S.014 / pH: x  Gluc: x / Ketone: Negative  / Bili: Negative / Urobili: <2 mg/dL   Blood: x / Protein: 100 mg/dL / Nitrite: Negative   Leuk Esterase: Negative / RBC: 1 /HPF / WBC 4 /HPF   Sq Epi: x / Non Sq Epi: 1 /HPF / Bacteria: Negative      REVIEW OF SYSTEMS:  CONSTITUTIONAL: No fever, weight loss, or fatigue  EYES: No eye pain, visual disturbances, or discharge  ENMT:  No difficulty hearing, tinnitus, vertigo; No sinus or throat pain  RESPIRATORY: No cough, wheezing, chills or hemoptysis; No shortness of breath  CARDIOVASCULAR: No chest pain, palpitations, dizziness, or leg swelling  GASTROINTESTINAL: No abdominal or epigastric pain. No nausea, vomiting, or hematemesis; No diarrhea or constipation. No melena or hematochezia.  GENITOURINARY: No dysuria, frequency, hematuria, or incontinence  NEUROLOGICAL: No headaches, memory loss, loss of strength, numbness, or tremors      Consultant(s) Notes Reviewed:  [x ] YES  [ ] NO    PHYSICAL EXAM:  GENERAL: NAD, well-groomed, well-developed, not in any distress ,  HEAD:  Atraumatic, Normocephalic  EYES: EOMI, PERRLA, conjunctiva and sclera clear  ENMT: No tonsillar erythema, exudates, or enlargement; Moist mucous membranes, Good dentition, No lesions  NECK: Supple, No JVD, Normal thyroid  NERVOUS SYSTEM:  Alert & Oriented X3, No focal deficit   CHEST/LUNG: Good air entry bilateral with no  rales, rhonchi, wheezing, or rubs  HEART: Regular rate and rhythm; No murmurs, rubs, or gallops  ABDOMEN: Soft, Nontender, Nondistended; Bowel sounds present  EXTREMITIES: No clubbing, cyanosis, or edema    Care Discussed with Consultants/Other Providers [ x] YES  [ ] NO

## 2020-08-29 NOTE — PROGRESS NOTE ADULT - SUBJECTIVE AND OBJECTIVE BOX
INFECTIOUS DISEASES FOLLOW UP--Jhonatan Carpenter MD  Pager [phone]    This is a follow up note for this  80y Male with  resolved fevers.  worsening kidney function.  developing pancytopenia (w elevated mcv)   rising AST/ALT  feels weak.    Further ROS:  CONSTITUTIONAL:  No fever, good appetite  CARDIOVASCULAR:  No chest pain or palpitations  RESPIRATORY:  No dyspnea  GASTROINTESTINAL:  No nausea, vomiting, diarrhea, or abdominal pain  GENITOURINARY:  No dysuria  NEUROLOGIC:  No headache,     Allergies  No Known Allergies    ANTIBIOTICS/RELEVANT:  antimicrobials  cefepime   IVPB 1000 milliGRAM(s) IV Intermittent every 24 hours    OTHER:  acetaminophen   Tablet .. 650 milliGRAM(s) Oral every 6 hours PRN  atorvastatin 20 milliGRAM(s) Oral at bedtime  calcitriol   Capsule 0.25 MICROGram(s) Oral daily  carvedilol 6.25 milliGRAM(s) Oral every 12 hours  colchicine 0.3 milliGRAM(s) Oral daily  dipyridamole 200 mG/aspirin 25 mG 1 Capsule(s) Oral two times a day  heparin   Injectable 5000 Unit(s) SubCutaneous every 12 hours  sodium chloride 0.45% 1000 milliLiter(s) IV Continuous <Continuous>    Objective:  Vital Signs Last 24 Hrs  T(C): 36.8 (29 Aug 2020 04:24), Max: 37.1 (29 Aug 2020 01:01)  T(F): 98.3 (29 Aug 2020 04:24), Max: 98.7 (29 Aug 2020 01:01)  HR: 71 (29 Aug 2020 05:24) (62 - 81)  BP: 144/77 (29 Aug 2020 05:24) (103/58 - 144/77)  BP(mean): --  RR: 20 (29 Aug 2020 04:24) (18 - 20)  SpO2: 95% (29 Aug 2020 04:24) (93% - 97%)    PHYSICAL EXAM:  Constitutional:no acute distress  Ear/Nose/Throat: no oral lesions, 	  Respiratory: clear BL  Cardiovascular: S1S2  Gastrointestinal:soft, (+) BS, no tenderness  Extremities:no e/e/c  No Lymphadenopathy  IV sites not inflammed.    LABS:                        9.7    4.40  )-----------( 109      ( 29 Aug 2020 07:07 )             29.3     08-29    141  |  101  |  84<H>  ----------------------------<  95  3.7   |  21<L>  |  5.04<H>    Ca    8.3<L>      29 Aug 2020 07:05  Phos  4.8       Mg     2.7         TPro  6.2  /  Alb  3.4  /  TBili  0.7  /  DBili  x   /  AST  468<H>  /  ALT  510<H>  /  AlkPhos  169<H>      Urinalysis Basic - ( 29 Aug 2020 00:30 )    Color: Yellow / Appearance: Slightly Turbid / S.014 / pH: x  Gluc: x / Ketone: Negative  / Bili: Negative / Urobili: <2 mg/dL   Blood: x / Protein: 100 mg/dL / Nitrite: Negative   Leuk Esterase: Negative / RBC: 1 /HPF / WBC 4 /HPF   Sq Epi: x / Non Sq Epi: 1 /HPF / Bacteria: Negative    MICROBIOLOGY: no + cx    RADIOLOGY & ADDITIONAL STUDIES:    < from: CT Abdomen and Pelvis No Cont (.26.20 @ 19:07) >    Lower lobe consolidation concerning for pneumonia.    No acute pathology in the abdomen or pelvis.      < end of copied text >

## 2020-08-29 NOTE — PROGRESS NOTE ADULT - ASSESSMENT
79yo m presents with weakness and found to have elevated transaminitis and gallstones on CT 8/26.      Plan:   - F/u Liver and GB US to evaluate the etiology of elevated LFT's. IF no signs of cholecystitis, defer to GI for further workup. IF found to have biliary dilatation please complete evaluation by obtaining MRCP.       Team surgery [id number]

## 2020-08-29 NOTE — PROGRESS NOTE ADULT - ASSESSMENT
imp/rx:  systemic process leading to injury to kidneys, liver and marrow.  no skin changes.  possible pna.  sepsis could explain a multisystem change, but unclear if any infection.  Lungs noted and on cefepime empirically.  blood cx currently neg.    would renal biopsy be useful?  would check acute hepatitis panel.  check [place] serology.  is vasculitis possible, particularly with the multisystem involvement.    no recent travel or wooded exposure.  no rash to suggest disseminated vzv/hsv.    for now stay on the empiric cefepime.

## 2020-08-29 NOTE — PROGRESS NOTE ADULT - SUBJECTIVE AND OBJECTIVE BOX
No acute event reported over the past 24 h    Patient seen and examined at bedside, pleasant and cooperative. Denies abdominal pain, N/V. Patient with +BM/+Gaz    Physical Exam  General: NAD, pleasant, and cooperative  Respiratory: Nl chest expansion, no labored breathing, cyanosis, or CP.  Abdominal: RUQ tenderness, - Huynh sign, S, ND, no rebound or guarding.  Extremities: No ISABELL.     Vital Signs Last 24 Hrs  T(C): 36.6 (30 Aug 2020 06:15), Max: 36.9 (29 Aug 2020 13:59)  T(F): 97.8 (30 Aug 2020 06:15), Max: 98.5 (29 Aug 2020 13:59)  HR: 68 (30 Aug 2020 06:15) (68 - 74)  BP: 144/67 (30 Aug 2020 06:15) (121/75 - 159/74)  BP(mean): --  RR: 20 (30 Aug 2020 06:15) (18 - 20)  SpO2: 94% (30 Aug 2020 06:15) (94% - 96%)    I&O's Detail    29 Aug 2020 07:01  -  30 Aug 2020 07:00  --------------------------------------------------------  IN:    IV PiggyBack: 50 mL    Oral Fluid: 840 mL    sodium chloride 0.45%: 2400 mL  Total IN: 3290 mL    OUT:    Voided: 1950 mL  Total OUT: 1950 mL    Total NET: 1340 mL      30 Aug 2020 07:01  -  30 Aug 2020 10:44  --------------------------------------------------------  IN:    Oral Fluid: 360 mL  Total IN: 360 mL    OUT:    Voided: 100 mL  Total OUT: 100 mL    Total NET: 260 mL          08-29    141  |  101  |  84<H>  ----------------------------<  95  3.7   |  21<L>  |  5.04<H>    Ca    8.3<L>      29 Aug 2020 07:05  Phos  4.8     08-29  Mg     2.7     08-29    TPro  5.5<L>  /  Alb  x   /  TBili  x   /  DBili  x   /  AST  x   /  ALT  x   /  AlkPhos  x   08-30                 9.7    4.40  )-----------( 109      ( 29 Aug 2020 07:07 )             29.3   MEDICATIONS  (STANDING):  atorvastatin 20 milliGRAM(s) Oral at bedtime  calcitriol   Capsule 0.25 MICROGram(s) Oral daily  carvedilol 6.25 milliGRAM(s) Oral every 12 hours  cefepime   IVPB 1000 milliGRAM(s) IV Intermittent every 24 hours  colchicine 0.3 milliGRAM(s) Oral daily  dipyridamole 200 mG/aspirin 25 mG 1 Capsule(s) Oral two times a day  heparin   Injectable 5000 Unit(s) SubCutaneous every 12 hours  sodium chloride 0.45% 1000 milliLiter(s) (100 mL/Hr) IV Continuous <Continuous>    MEDICATIONS  (PRN):  acetaminophen   Tablet .. 650 milliGRAM(s) Oral every 6 hours PRN Temp greater or equal to 38C (100.4F), Moderate Pain (4 - 6)

## 2020-08-29 NOTE — CONSULT NOTE ADULT - ASSESSMENT
80yom pmhx of HTN , HLD  ,prostate CA , PVD bib ems for weakness and fevers. He was noted to have possible PNA. Heme consulted for anemia and thrombocytopenia    Anemia -- MCV elevated, ? MDS   -- check B12, folate, TSH  -- check SPEP, MARIO, hapto  -- transfuse for hgb <7, monitor CBC    thrombocytopenia -- ? MDS vs acute reactive process  -- check labs as above  -- check fibrinogen, low suspicion for DIC though  -- plts adequate, monitor for now    fever --  ID eval noted    prostate ca -- outpt f/u    Will follow, will d/w primary team, [phone] 80yom pmhx of HTN , HLD  ,prostate CA , PVD bib ems for weakness and fevers. He was noted to have possible PNA. Heme consulted for anemia and thrombocytopenia    Anemia -- MCV elevated, ? MDS   -- check B12, folate, TSH, Fe panel  -- check SPEP, MARIO, hapto  -- transfuse for hgb <7, monitor CBC    thrombocytopenia -- ? MDS vs acute reactive process  -- check labs as above  -- check fibrinogen, low suspicion for DIC though  -- plts adequate, monitor for now    fever --  ID eval noted    prostate ca -- outpt f/u    Will follow, will d/w primary team, [phone]

## 2020-08-30 LAB
FERRITIN SERPL-MCNC: 5333 NG/ML — HIGH (ref 30–400)
FIBRINOGEN PPP-MCNC: 894 MG/DL — HIGH (ref 290–520)
FOLATE SERPL-MCNC: 19.3 NG/ML — SIGNIFICANT CHANGE UP
HAPTOGLOB SERPL-MCNC: 345 MG/DL — HIGH (ref 34–200)
HAV IGM SER-ACNC: SIGNIFICANT CHANGE UP
HBV CORE IGM SER-ACNC: SIGNIFICANT CHANGE UP
HBV SURFACE AG SER-ACNC: SIGNIFICANT CHANGE UP
HCV AB S/CO SERPL IA: 0.09 S/CO — SIGNIFICANT CHANGE UP (ref 0–0.99)
HCV AB SERPL-IMP: SIGNIFICANT CHANGE UP
IRON SATN MFR SERPL: 52 % — SIGNIFICANT CHANGE UP (ref 16–55)
IRON SATN MFR SERPL: 93 UG/DL — SIGNIFICANT CHANGE UP (ref 45–165)
PROT SERPL-MCNC: 5.5 G/DL — LOW (ref 6–8.3)
PROT SERPL-MCNC: 5.5 G/DL — LOW (ref 6–8.3)
TIBC SERPL-MCNC: 179 UG/DL — LOW (ref 220–430)
TSH SERPL-MCNC: 2.6 UIU/ML — SIGNIFICANT CHANGE UP (ref 0.27–4.2)
UIBC SERPL-MCNC: 86 UG/DL — LOW (ref 110–370)
VIT B12 SERPL-MCNC: 1688 PG/ML — HIGH (ref 232–1245)

## 2020-08-30 RX ADMIN — CEFEPIME 100 MILLIGRAM(S): 1 INJECTION, POWDER, FOR SOLUTION INTRAMUSCULAR; INTRAVENOUS at 11:41

## 2020-08-30 RX ADMIN — CALCITRIOL 0.25 MICROGRAM(S): 0.5 CAPSULE ORAL at 11:41

## 2020-08-30 RX ADMIN — SODIUM CHLORIDE 100 MILLILITER(S): 9 INJECTION, SOLUTION INTRAVENOUS at 00:30

## 2020-08-30 RX ADMIN — Medication 0.3 MILLIGRAM(S): at 11:40

## 2020-08-30 RX ADMIN — HEPARIN SODIUM 5000 UNIT(S): 5000 INJECTION INTRAVENOUS; SUBCUTANEOUS at 06:30

## 2020-08-30 RX ADMIN — ASPIRIN AND DIPYRIDAMOLE 1 CAPSULE(S): 25; 200 CAPSULE, EXTENDED RELEASE ORAL at 17:14

## 2020-08-30 RX ADMIN — HEPARIN SODIUM 5000 UNIT(S): 5000 INJECTION INTRAVENOUS; SUBCUTANEOUS at 17:13

## 2020-08-30 RX ADMIN — ASPIRIN AND DIPYRIDAMOLE 1 CAPSULE(S): 25; 200 CAPSULE, EXTENDED RELEASE ORAL at 06:30

## 2020-08-30 RX ADMIN — CARVEDILOL PHOSPHATE 6.25 MILLIGRAM(S): 80 CAPSULE, EXTENDED RELEASE ORAL at 17:14

## 2020-08-30 RX ADMIN — CARVEDILOL PHOSPHATE 6.25 MILLIGRAM(S): 80 CAPSULE, EXTENDED RELEASE ORAL at 06:30

## 2020-08-30 NOTE — PROGRESS NOTE ADULT - ASSESSMENT
81yo m presents with weakness and found to have elevated transaminitis and gallstones on CT 8/26.      Plan:   - F/u Liver and GB US to evaluate the etiology of elevated LFT's. IF no signs of cholecystitis, defer to GI for further workup. IF found to have biliary dilatation please complete evaluation by obtaining MRCP.       Team surgery [id number]

## 2020-08-30 NOTE — PROGRESS NOTE ADULT - ASSESSMENT
80yom pmhx of HTN , HLD  ,prostate CA , PVD bib ems for weakness and fevers. He was noted to have possible PNA. Heme consulted for anemia and thrombocytopenia    Anemia -- MCV elevated, ? MDS   b12, folate, tsh, iron panel adeq  -ferritin extremely elevated- acute phase reactant  -- check SPEP, MARIO, hapto  -- transfuse for hgb <7, monitor CBC    thrombocytopenia -- ? MDS vs acute reactive process  -- check labs as above  -- fibrinogen also elev as it is acute phase reactant, no DIC  -- plts adequate, monitor for now    fever --  ID eval noted    prostate ca -- outpt f/u- check PSA    Elev LFT  -f/u GI, ID w/u    Thank you for the courtesy of this consultation and we will continue to follow.    Cruz Sanabria MD  [facility]  Cell: [phone]

## 2020-08-30 NOTE — PROGRESS NOTE ADULT - ASSESSMENT
80yom pmhx of HTN HLD ,PVD prostate CA bib ems for weakness and fever for past 2 days. unable to get off the couch since yesterday. No chest pain no sob, no nausea or vomiting. +incontinence of urine; seen by PMD this week for arthritis and started on Tramadol.     Problem/Plan - 1:  ·  Problem: Sepsis.  Plan: Hemodynamically stable. S/P cultures . IV Abxs. ID help appreciated.     Clinically better.      Problem/Plan - 2:  ·  Problem: Pneumonia.  Plan: IV Abxs. Pulmonary following.      Problem/Plan - 3:  ·  Problem: Left leg pain with PVD .  Plan:  Vascular helping.      Problem/Plan - 4:  ·  Problem: CKD (chronic kidney disease) stage 3, GFR 30-59 ml/min with Metabolic Acidosis with JUAN RAMON  .  Plan: Renal following.   BMP worsening.      Problem/Plan - 5:  ·  Problem: Hypertension.  Plan: BP meds with hold parameters.      Problem/Plan - 6:  Problem: Hypercholesterolemia. Plan: Statin.     Problem/Plan - 7:  ·  Problem: Gout.  Plan: Colchicine.      Problem/Plan - 8:  ·  Problem: Anemia, chronic disease with Thrombocytopenia .  Plan: Work up noted.  Hematology following.      Problem/Plan - 9:  ·  Problem:  PC (prostate cancer).  Plan: Outpt follow up.      Problem/Plan - 10:  Problem: Cholelithiasis. Plan; LFT trending Up so will hold statin and getting  US RUQ.   Surgery consult noted.  May need MRI .   Rpt labs pending.

## 2020-08-30 NOTE — PROGRESS NOTE ADULT - SUBJECTIVE AND OBJECTIVE BOX
Pt seen. comfortable      MEDICATIONS  (STANDING):  atorvastatin 20 milliGRAM(s) Oral at bedtime  calcitriol   Capsule 0.25 MICROGram(s) Oral daily  carvedilol 6.25 milliGRAM(s) Oral every 12 hours  cefepime   IVPB 1000 milliGRAM(s) IV Intermittent every 24 hours  colchicine 0.3 milliGRAM(s) Oral daily  dipyridamole 200 mG/aspirin 25 mG 1 Capsule(s) Oral two times a day  heparin   Injectable 5000 Unit(s) SubCutaneous every 12 hours  sodium chloride 0.45% 1000 milliLiter(s) (100 mL/Hr) IV Continuous <Continuous>    MEDICATIONS  (PRN):  acetaminophen   Tablet .. 650 milliGRAM(s) Oral every 6 hours PRN Temp greater or equal to 38C (100.4F), Moderate Pain (4 - 6)      ROS  No fever, sweats, chills  No epistaxis, HA, sore throat  No CP, SOB, cough, sputum  No n/v/d, abd pain, melena, hematochezia  No edema  No rash  No anxiety  No back pain, joint pain  No bleeding, bruising  No dysuria, hematuria    Vital Signs Last 24 Hrs  T(C): 36.6 (30 Aug 2020 06:15), Max: 36.9 (29 Aug 2020 13:59)  T(F): 97.8 (30 Aug 2020 06:15), Max: 98.5 (29 Aug 2020 13:59)  HR: 68 (30 Aug 2020 06:15) (68 - 74)  BP: 144/67 (30 Aug 2020 06:15) (121/75 - 159/74)  BP(mean): --  RR: 20 (30 Aug 2020 06:15) (18 - 20)  SpO2: 94% (30 Aug 2020 06:15) (94% - 96%)    PE  NAD  Awake, alert  Anicteric, MMM  RRR  CTAB  Abd soft, NT, ND  No c/c/e  No rash grossly  FROM                          9.7    4.40  )-----------( 109      ( 29 Aug 2020 07:07 )             29.3       08-29    141  |  101  |  84<H>  ----------------------------<  95  3.7   |  21<L>  |  5.04<H>    Ca    8.3<L>      29 Aug 2020 07:05  Phos  4.8     08-29  Mg     2.7     08-29    TPro  5.5<L>  /  Alb  x   /  TBili  x   /  DBili  x   /  AST  x   /  ALT  x   /  AlkPhos  x   08-30

## 2020-08-30 NOTE — PROGRESS NOTE ADULT - SUBJECTIVE AND OBJECTIVE BOX
INTERVAL HPI/OVERNIGHT EVENTS: I feel fine and eat well.   Vital Signs Last 24 Hrs  T(C): 36.6 (30 Aug 2020 06:15), Max: 36.7 (29 Aug 2020 19:22)  T(F): 97.8 (30 Aug 2020 06:15), Max: 98.1 (29 Aug 2020 19:22)  HR: 68 (30 Aug 2020 06:15) (68 - 74)  BP: 144/67 (30 Aug 2020 06:15) (121/75 - 159/74)  BP(mean): --  RR: 20 (30 Aug 2020 06:15) (18 - 20)  SpO2: 94% (30 Aug 2020 06:15) (94% - 95%)  I&O's Summary    29 Aug 2020 07:01  -  30 Aug 2020 07:00  --------------------------------------------------------  IN: 3290 mL / OUT: 1950 mL / NET: 1340 mL    30 Aug 2020 07:01  -  30 Aug 2020 16:07  --------------------------------------------------------  IN: 720 mL / OUT: 500 mL / NET: 220 mL      MEDICATIONS  (STANDING):  atorvastatin 20 milliGRAM(s) Oral at bedtime  calcitriol   Capsule 0.25 MICROGram(s) Oral daily  carvedilol 6.25 milliGRAM(s) Oral every 12 hours  cefepime   IVPB 1000 milliGRAM(s) IV Intermittent every 24 hours  colchicine 0.3 milliGRAM(s) Oral daily  dipyridamole 200 mG/aspirin 25 mG 1 Capsule(s) Oral two times a day  heparin   Injectable 5000 Unit(s) SubCutaneous every 12 hours  sodium chloride 0.45% 1000 milliLiter(s) (100 mL/Hr) IV Continuous <Continuous>    MEDICATIONS  (PRN):  acetaminophen   Tablet .. 650 milliGRAM(s) Oral every 6 hours PRN Temp greater or equal to 38C (100.4F), Moderate Pain (4 - 6)    LABS:                        9.7    4.40  )-----------( 109      ( 29 Aug 2020 07:07 )             29.3     08-    141  |  101  |  84<H>  ----------------------------<  95  3.7   |  21<L>  |  5.04<H>    Ca    8.3<L>      29 Aug 2020 07:05  Phos  4.8       Mg     2.7         TPro  5.5<L>  /  Alb  x   /  TBili  x   /  DBili  x   /  AST  x   /  ALT  x   /  AlkPhos  x         Urinalysis Basic - ( 29 Aug 2020 00:30 )    Color: Yellow / Appearance: Slightly Turbid / S.014 / pH: x  Gluc: x / Ketone: Negative  / Bili: Negative / Urobili: <2 mg/dL   Blood: x / Protein: 100 mg/dL / Nitrite: Negative   Leuk Esterase: Negative / RBC: 1 /HPF / WBC 4 /HPF   Sq Epi: x / Non Sq Epi: 1 /HPF / Bacteria: Negative      CAPILLARY BLOOD GLUCOSE            Urinalysis Basic - ( 29 Aug 2020 00:30 )    Color: Yellow / Appearance: Slightly Turbid / S.014 / pH: x  Gluc: x / Ketone: Negative  / Bili: Negative / Urobili: <2 mg/dL   Blood: x / Protein: 100 mg/dL / Nitrite: Negative   Leuk Esterase: Negative / RBC: 1 /HPF / WBC 4 /HPF   Sq Epi: x / Non Sq Epi: 1 /HPF / Bacteria: Negative      REVIEW OF SYSTEMS:  CONSTITUTIONAL: No fever, weight loss, or fatigue  EYES: No eye pain, visual disturbances, or discharge  ENMT:  No difficulty hearing, tinnitus, vertigo; No sinus or throat pain  NECK: No pain or stiffness  RESPIRATORY: No cough, wheezing, chills or hemoptysis; No shortness of breath  CARDIOVASCULAR: No chest pain, palpitations, dizziness, or leg swelling  GASTROINTESTINAL: No abdominal or epigastric pain. No nausea, vomiting, or hematemesis; No diarrhea or constipation. No melena or hematochezia.  GENITOURINARY: No dysuria, frequency, hematuria, or incontinence  NEUROLOGICAL: No headaches, memory loss, loss of strength, numbness, or tremors      RADIOLOGY & ADDITIONAL TESTS:    Consultant(s) Notes Reviewed:  [x ] YES  [ ] NO    PHYSICAL EXAM:  GENERAL: NAD, well-groomed, well-developed, not in any distress ,  HEAD:  Atraumatic, Normocephalic  EYES: EOMI, PERRLA, conjunctiva and sclera clear  ENMT: No tonsillar erythema, exudates, or enlargement; Moist mucous membranes, Good dentition, No lesions  NECK: Supple, No JVD, Normal thyroid  NERVOUS SYSTEM:  Alert & Oriented X3, No focal deficit   CHEST/LUNG: Good air entry bilateral with no  rales, rhonchi, wheezing, or rubs  HEART: Regular rate and rhythm; No murmurs, rubs, or gallops  ABDOMEN: Soft, Nontender, Nondistended; Bowel sounds present  EXTREMITIES:  No clubbing, cyanosis, or edema    Care Discussed with Consultants/Other Providers [ x] YES  [ ] NO

## 2020-08-31 DIAGNOSIS — R79.89 OTHER SPECIFIED ABNORMAL FINDINGS OF BLOOD CHEMISTRY: ICD-10-CM

## 2020-08-31 LAB
% ALBUMIN: 54.8 % — SIGNIFICANT CHANGE UP
% ALPHA 1: 8.7 % — SIGNIFICANT CHANGE UP
% ALPHA 2: 14.9 % — SIGNIFICANT CHANGE UP
% BETA: 11.1 % — SIGNIFICANT CHANGE UP
% GAMMA: 10.5 % — SIGNIFICANT CHANGE UP
ALBUMIN SERPL ELPH-MCNC: 3 G/DL — LOW (ref 3.6–5.5)
ALBUMIN SERPL ELPH-MCNC: 3.4 G/DL — SIGNIFICANT CHANGE UP (ref 3.3–5)
ALBUMIN/GLOB SERPL ELPH: 1.2 RATIO — SIGNIFICANT CHANGE UP
ALP SERPL-CCNC: 179 U/L — HIGH (ref 40–120)
ALPHA1 GLOB SERPL ELPH-MCNC: 0.5 G/DL — HIGH (ref 0.1–0.4)
ALPHA2 GLOB SERPL ELPH-MCNC: 0.8 G/DL — SIGNIFICANT CHANGE UP (ref 0.5–1)
ALT FLD-CCNC: 358 U/L — HIGH (ref 10–45)
ANION GAP SERPL CALC-SCNC: 14 MMOL/L — SIGNIFICANT CHANGE UP (ref 5–17)
AST SERPL-CCNC: 185 U/L — HIGH (ref 10–40)
B-GLOBULIN SERPL ELPH-MCNC: 0.6 G/DL — SIGNIFICANT CHANGE UP (ref 0.5–1)
BILIRUB SERPL-MCNC: 0.4 MG/DL — SIGNIFICANT CHANGE UP (ref 0.2–1.2)
BUN SERPL-MCNC: 69 MG/DL — HIGH (ref 7–23)
CALCIUM SERPL-MCNC: 8.3 MG/DL — LOW (ref 8.4–10.5)
CHLORIDE SERPL-SCNC: 102 MMOL/L — SIGNIFICANT CHANGE UP (ref 96–108)
CO2 SERPL-SCNC: 25 MMOL/L — SIGNIFICANT CHANGE UP (ref 22–31)
CREAT SERPL-MCNC: 3.13 MG/DL — HIGH (ref 0.5–1.3)
CRP SERPL-MCNC: 6.88 MG/DL — HIGH (ref 0–0.4)
ERYTHROCYTE [SEDIMENTATION RATE] IN BLOOD: 118 MM/HR — HIGH (ref 0–20)
GAMMA GLOBULIN: 0.6 G/DL — SIGNIFICANT CHANGE UP (ref 0.6–1.6)
GLUCOSE SERPL-MCNC: 122 MG/DL — HIGH (ref 70–99)
HAV IGM SER-ACNC: SIGNIFICANT CHANGE UP
HBV CORE IGM SER-ACNC: SIGNIFICANT CHANGE UP
HBV SURFACE AG SER-ACNC: SIGNIFICANT CHANGE UP
HCT VFR BLD CALC: 29.6 % — LOW (ref 39–50)
HCV AB S/CO SERPL IA: 0.11 S/CO — SIGNIFICANT CHANGE UP (ref 0–0.99)
HCV AB SERPL-IMP: SIGNIFICANT CHANGE UP
HGB BLD-MCNC: 9.7 G/DL — LOW (ref 13–17)
INTERPRETATION SERPL IFE-IMP: SIGNIFICANT CHANGE UP
MCHC RBC-ENTMCNC: 32.8 GM/DL — SIGNIFICANT CHANGE UP (ref 32–36)
MCHC RBC-ENTMCNC: 35.9 PG — HIGH (ref 27–34)
MCV RBC AUTO: 109.6 FL — HIGH (ref 80–100)
NRBC # BLD: 0 /100 WBCS — SIGNIFICANT CHANGE UP (ref 0–0)
PLATELET # BLD AUTO: 141 K/UL — LOW (ref 150–400)
POTASSIUM SERPL-MCNC: 3.7 MMOL/L — SIGNIFICANT CHANGE UP (ref 3.5–5.3)
POTASSIUM SERPL-SCNC: 3.7 MMOL/L — SIGNIFICANT CHANGE UP (ref 3.5–5.3)
PROT PATTERN SERPL ELPH-IMP: SIGNIFICANT CHANGE UP
PROT SERPL-MCNC: 6.2 G/DL — SIGNIFICANT CHANGE UP (ref 6–8.3)
RBC # BLD: 2.7 M/UL — LOW (ref 4.2–5.8)
RBC # FLD: 17.6 % — HIGH (ref 10.3–14.5)
RHEUMATOID FACT SERPL-ACNC: <10 IU/ML — SIGNIFICANT CHANGE UP (ref 0–13)
SARS-COV-2 IGG SERPL IA-ACNC: <0.3 RATIO — SIGNIFICANT CHANGE UP
SARS-COV-2 IGG SERPL QL IA: NEGATIVE — SIGNIFICANT CHANGE UP
SARS-COV-2 IGG SERPL QL IA: NEGATIVE — SIGNIFICANT CHANGE UP
SARS-COV-2 IGM SERPL IA-ACNC: 0.25 RATIO — SIGNIFICANT CHANGE UP
SODIUM SERPL-SCNC: 141 MMOL/L — SIGNIFICANT CHANGE UP (ref 135–145)
WBC # BLD: 2.94 K/UL — LOW (ref 3.8–10.5)
WBC # FLD AUTO: 2.94 K/UL — LOW (ref 3.8–10.5)

## 2020-08-31 PROCEDURE — 93975 VASCULAR STUDY: CPT | Mod: 26

## 2020-08-31 PROCEDURE — 99232 SBSQ HOSP IP/OBS MODERATE 35: CPT

## 2020-08-31 PROCEDURE — 99223 1ST HOSP IP/OBS HIGH 75: CPT

## 2020-08-31 PROCEDURE — 76700 US EXAM ABDOM COMPLETE: CPT | Mod: 26,59

## 2020-08-31 RX ORDER — SODIUM CHLORIDE 9 MG/ML
1000 INJECTION INTRAMUSCULAR; INTRAVENOUS; SUBCUTANEOUS
Refills: 0 | Status: DISCONTINUED | OUTPATIENT
Start: 2020-08-31 | End: 2020-08-31

## 2020-08-31 RX ORDER — SODIUM CHLORIDE 9 MG/ML
1000 INJECTION INTRAMUSCULAR; INTRAVENOUS; SUBCUTANEOUS
Refills: 0 | Status: DISCONTINUED | OUTPATIENT
Start: 2020-08-31 | End: 2020-09-03

## 2020-08-31 RX ADMIN — SODIUM CHLORIDE 100 MILLILITER(S): 9 INJECTION, SOLUTION INTRAVENOUS at 12:08

## 2020-08-31 RX ADMIN — HEPARIN SODIUM 5000 UNIT(S): 5000 INJECTION INTRAVENOUS; SUBCUTANEOUS at 17:11

## 2020-08-31 RX ADMIN — CARVEDILOL PHOSPHATE 6.25 MILLIGRAM(S): 80 CAPSULE, EXTENDED RELEASE ORAL at 17:11

## 2020-08-31 RX ADMIN — ASPIRIN AND DIPYRIDAMOLE 1 CAPSULE(S): 25; 200 CAPSULE, EXTENDED RELEASE ORAL at 17:12

## 2020-08-31 RX ADMIN — SODIUM CHLORIDE 100 MILLILITER(S): 9 INJECTION, SOLUTION INTRAVENOUS at 00:41

## 2020-08-31 RX ADMIN — ASPIRIN AND DIPYRIDAMOLE 1 CAPSULE(S): 25; 200 CAPSULE, EXTENDED RELEASE ORAL at 06:40

## 2020-08-31 RX ADMIN — Medication 0.3 MILLIGRAM(S): at 12:08

## 2020-08-31 RX ADMIN — SODIUM CHLORIDE 50 MILLILITER(S): 9 INJECTION INTRAMUSCULAR; INTRAVENOUS; SUBCUTANEOUS at 18:52

## 2020-08-31 RX ADMIN — CEFEPIME 100 MILLIGRAM(S): 1 INJECTION, POWDER, FOR SOLUTION INTRAMUSCULAR; INTRAVENOUS at 12:08

## 2020-08-31 RX ADMIN — CALCITRIOL 0.25 MICROGRAM(S): 0.5 CAPSULE ORAL at 12:08

## 2020-08-31 RX ADMIN — SODIUM CHLORIDE 100 MILLILITER(S): 9 INJECTION INTRAMUSCULAR; INTRAVENOUS; SUBCUTANEOUS at 23:13

## 2020-08-31 RX ADMIN — HEPARIN SODIUM 5000 UNIT(S): 5000 INJECTION INTRAVENOUS; SUBCUTANEOUS at 06:40

## 2020-08-31 RX ADMIN — CARVEDILOL PHOSPHATE 6.25 MILLIGRAM(S): 80 CAPSULE, EXTENDED RELEASE ORAL at 06:40

## 2020-08-31 NOTE — CONSULT NOTE ADULT - REASON FOR ADMISSION
Weakness and fever

## 2020-08-31 NOTE — PROGRESS NOTE ADULT - SUBJECTIVE AND OBJECTIVE BOX
infectious diseases progress note:    Patient is a 80y old  Male who presents with a chief complaint of weakness and fever (31 Aug 2020 06:39)        Sepsis             Allergies    No Known Allergies    Intolerances        ANTIBIOTICS/RELEVANT:  antimicrobials  cefepime   IVPB 1000 milliGRAM(s) IV Intermittent every 24 hours    immunologic:    OTHER:  acetaminophen   Tablet .. 650 milliGRAM(s) Oral every 6 hours PRN  calcitriol   Capsule 0.25 MICROGram(s) Oral daily  carvedilol 6.25 milliGRAM(s) Oral every 12 hours  colchicine 0.3 milliGRAM(s) Oral daily  dipyridamole 200 mG/aspirin 25 mG 1 Capsule(s) Oral two times a day  heparin   Injectable 5000 Unit(s) SubCutaneous every 12 hours  sodium chloride 0.45% 1000 milliLiter(s) IV Continuous <Continuous>      Objective:  Vital Signs Last 24 Hrs  T(C): 36.9 (31 Aug 2020 06:07), Max: 37 (30 Aug 2020 20:07)  T(F): 98.4 (31 Aug 2020 06:07), Max: 98.6 (30 Aug 2020 20:07)  HR: 67 (31 Aug 2020 06:07) (67 - 73)  BP: 168/79 (31 Aug 2020 06:07) (150/78 - 168/79)  BP(mean): --  RR: 20 (31 Aug 2020 06:07) (18 - 20)  SpO2: 95% (31 Aug 2020 06:07) (92% - 95%)       Eyes:CRESCENCIO, EOMI  Ear/Nose/Throat: no oral lesion, no sinus tenderness on percussion	  Neck:no JVD, no lymphadenopathy, supple  Respiratory: CTA raghu  Cardiovascular: S1S2 RRR, no murmurs  Gastrointestinal:soft, (+) BS, no HSM  Extremities:no e/e/c        LABS:                        9.7    2.94  )-----------( 141      ( 31 Aug 2020 06:49 )             29.6     08-31    141  |  102  |  69<H>  ----------------------------<  122<H>  3.7   |  25  |  3.13<H>    Ca    8.3<L>      31 Aug 2020 06:49    TPro  6.2  /  Alb  3.4  /  TBili  0.4  /  DBili  x   /  AST  185<H>  /  ALT  358<H>  /  AlkPhos  179<H>  08-31            MICROBIOLOGY:    RECENT CULTURES:  08-28 @ 15:19 .Blood Blood-Venous                No growth to date.    08-27 @ 00:37 .Urine Clean Catch (Midstream)                No growth    08-26 @ 23:06 .Blood Blood-Peripheral                No growth to date.          RESPIRATORY CULTURES:              RADIOLOGY & ADDITIONAL STUDIES:        Pager [phone]  After 5 pm/weekends or if no response :[phone]

## 2020-08-31 NOTE — PROGRESS NOTE ADULT - SUBJECTIVE AND OBJECTIVE BOX
Overnight events noted      VITAL:  T(C): , Max: 37 (08-30-20 @ 20:07)  T(F): , Max: 98.6 (08-30-20 @ 20:07)  HR: 68 (08-31-20 @ 12:28)  BP: 171/76 (08-31-20 @ 12:28)  RR: 18 (08-31-20 @ 12:28)  SpO2: 97% (08-31-20 @ 12:28)      PHYSICAL EXAM:  Constitutional: NAD, Alert  HEENT: NCAT, DMM  Neck: Supple, No JVD  Respiratory: CTA-b/l  Cardiovascular: RRR s1s2, no m/r/g  Gastrointestinal: BS+, soft, NT/ND  Extremities: No peripheral edema b/l  Neurological: no focal deficits; strength grossly intact  Back: no CVAT b/l  Skin: No rashes, no nevi    LABS:                        9.7    2.94  )-----------( 141      ( 31 Aug 2020 06:49 )             29.6     Na(141)/K(3.7)/Cl(102)/HCO3(25)/BUN(69)/Cr(3.13)Glu(122)/Ca(8.3)/Mg(--)/PO4(--)    08-31 @ 06:49  Na(141)/K(3.7)/Cl(101)/HCO3(21)/BUN(84)/Cr(5.04)Glu(95)/Ca(8.3)/Mg(2.7)/PO4(4.8)    08-29 @ 07:05    Uric Acid, Serum (08.29.20 @ 07:05)    Uric Acid, Serum: 11.7 mg/dL    Urine Microscopic-Add On (NC) (08.29.20 @ 00:30)    Epithelial Cells: 1 /HPF    Bacteria: Negative    White Blood Cell - Urine: 4 /HPF    Red Blood Cell - Urine: 1 /HPF    Hyaline Casts: 1 /LPF    Comment - Urine: Moderate Yeast      IMAGING:  < from: US Duplex Kidneys (08.31.20 @ 10:07) >  Right kidney:  5.7 cm. No renal mass, hydronephrosis or calculi.  Left kidney:  12.1 cm. increased cortical echogenicity.Fetal lobulations. No hydronephrosis. 1.9 cm upper pole cyst.  Urinary bladder: Underdistended.  RIGHTRenal Artery: Not measured.  LEFTRenal Artery: Peak systolic velocity is 323 cm/sec origin, 296 cm/sec proximal, 73 cm/sec mid, 30 cm/sec distal and 53 cm/sec hilum. Tardus parvus waveforms from the mid artery to hilum  UpperSegmental Artery:  RI = 1.0  Middle Segmental Artery: RI = 1.0  Lower Segmental Artery: RI = 1.0  IMPRESSION:  Hemodynamically significant stenosis of the left renal artery origin to proximal segments with downstream effects noted.        IMPRESSION: 80M w/ HTN, gout, PAD, and prostate CA s/p resection, 8/26/20 a/w PNA    (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 Atrophic right kidney; likely due to renal artery stenosis. Appears that he has significant renal artery stenosis on the left side as well.     (2)JUAN RAMON -  given his left renal artery stenosis, he likely tends to need high systolic BPs to allow for adequate perfusion of his left kidney. Most likely his low-normal BP days ago was too low to allow for the needed left renal perfusion, causing both prerenal azotemia, and a component of mild ischemic ATN as well. Of note, urinalysis not consistent with AIN. And whereas the serum uric acid level is high, it was not high enough to induce JUAN RAMON. Numbers significantly improved relative to 2 days ago.     (3)Metabolic acidosis - resolved    (4)Vasc - severe left renal artery stenosis. Given that he only has 1 functioning kidney, we must do all we can to preserve it...likely could benefit from intervention on the stenotic vessel.      RECOMMEND:  (1)Can d/c IVF at this point  (2)BMP+Mg+PO4 daily  (3)Renal artery interventionalist evaluation        Edis Cabral MD  [facility]  Office: [phone]  Cell: [phone] No pain, no sob      VITAL:  T(C): , Max: 37 (08-30-20 @ 20:07)  T(F): , Max: 98.6 (08-30-20 @ 20:07)  HR: 68 (08-31-20 @ 12:28)  BP: 171/76 (08-31-20 @ 12:28)  RR: 18 (08-31-20 @ 12:28)  SpO2: 97% (08-31-20 @ 12:28)      PHYSICAL EXAM:  Constitutional: NAD, Alert  HEENT: NCAT, DMM  Neck: Supple, No JVD  Respiratory: CTA-b/l  Cardiovascular: RRR s1s2, no m/r/g  Gastrointestinal: BS+, soft, NT/ND  Extremities: No peripheral edema b/l  Neurological: no focal deficits; strength grossly intact  Back: no CVAT b/l  Skin: No rashes, no nevi    LABS:                        9.7    2.94  )-----------( 141      ( 31 Aug 2020 06:49 )             29.6     Na(141)/K(3.7)/Cl(102)/HCO3(25)/BUN(69)/Cr(3.13)Glu(122)/Ca(8.3)/Mg(--)/PO4(--)    08-31 @ 06:49  Na(141)/K(3.7)/Cl(101)/HCO3(21)/BUN(84)/Cr(5.04)Glu(95)/Ca(8.3)/Mg(2.7)/PO4(4.8)    08-29 @ 07:05    Uric Acid, Serum (08.29.20 @ 07:05)    Uric Acid, Serum: 11.7 mg/dL    Urine Microscopic-Add On (NC) (08.29.20 @ 00:30)    Epithelial Cells: 1 /HPF    Bacteria: Negative    White Blood Cell - Urine: 4 /HPF    Red Blood Cell - Urine: 1 /HPF    Hyaline Casts: 1 /LPF    Comment - Urine: Moderate Yeast      IMAGING:  < from: US Duplex Kidneys (08.31.20 @ 10:07) >  Right kidney:  5.7 cm. No renal mass, hydronephrosis or calculi.  Left kidney:  12.1 cm. increased cortical echogenicity.Fetal lobulations. No hydronephrosis. 1.9 cm upper pole cyst.  Urinary bladder: Underdistended.  RIGHTRenal Artery: Not measured.  LEFTRenal Artery: Peak systolic velocity is 323 cm/sec origin, 296 cm/sec proximal, 73 cm/sec mid, 30 cm/sec distal and 53 cm/sec hilum. Tardus parvus waveforms from the mid artery to hilum  UpperSegmental Artery:  RI = 1.0  Middle Segmental Artery: RI = 1.0  Lower Segmental Artery: RI = 1.0  IMPRESSION:  Hemodynamically significant stenosis of the left renal artery origin to proximal segments with downstream effects noted.        IMPRESSION: 80M w/ HTN, gout, PAD, and prostate CA s/p resection, 8/26/20 a/w PNA    (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 Atrophic right kidney; likely due to renal artery stenosis. Appears that he has significant renal artery stenosis on the left side as well.     (2)JUAN RAMON -  given his left renal artery stenosis, he likely tends to need high systolic BPs to allow for adequate perfusion of his left kidney. Most likely his low-normal BP days ago was too low to allow for the needed left renal perfusion, causing both prerenal azotemia, and a component of mild ischemic ATN as well. Of note, urinalysis not consistent with AIN. And whereas the serum uric acid level is high, it was not high enough to induce JUAN RAMON. Numbers significantly improved relative to 2 days ago.     (3)Metabolic acidosis - resolved    (4)Vasc - severe left renal artery stenosis. Given that he only has 1 functioning kidney, we must do all we can to preserve it...likely could benefit from intervention on the stenotic vessel.      RECOMMEND:  (1)Can d/c IVF at this point  (2)BMP+Mg+PO4 daily  (3)Renal artery interventionalist evaluation        Edis Cabral MD  [facility]  Office: [phone]  Cell: [phone]

## 2020-08-31 NOTE — PROGRESS NOTE ADULT - ASSESSMENT
imp/rx:  systemic process leading to injury to kidneys, liver and marrow.  no skin changes.  possible pna.  sepsis could explain a multisystem change, but unclear if any infection.  Lungs noted and on cefepime empirically.  blood cx currently neg.     bi2bxhoy better and probably could complete ab with po but LFts abnormal and no clinical signs of cholecystitis   await ultrasound and decision of workup.  no uniforming diagnosis yet   possible liver biopsy ??

## 2020-08-31 NOTE — PROGRESS NOTE ADULT - ASSESSMENT
Assessment:  80M p/w weakness and found to have elevated transaminitis and gallstones on CT 8/26. Currently without abdominal complaints.     Plan:   - RUQ US to evaluate the etiology of elevated LFT's  - If no signs of cholecystitis, defer to GI for further workup  - Further recommendations pending above workup    Please contact [facility] (p. [phone]) with any questions.     Alma Power, PGY2  Surgery, Red Team   Pager [phone]  [facility]

## 2020-08-31 NOTE — PROGRESS NOTE ADULT - SUBJECTIVE AND OBJECTIVE BOX
GENERAL SURGERY PROGRESS NOTE    80yMale    SUBJECTIVE:  Patient seen and examined at bedside. No acute events overnight. Denies abdominal pain. Tolerating diet.     --------------------------------------------------------------------------------------------------  OBJECTIVE:     Vital Signs:  Vital Signs Last 24 Hrs  T(C): 36.9 (31 Aug 2020 06:07), Max: 37 (30 Aug 2020 20:07)  T(F): 98.4 (31 Aug 2020 06:07), Max: 98.6 (30 Aug 2020 20:07)  HR: 67 (31 Aug 2020 06:07) (67 - 73)  BP: 168/79 (31 Aug 2020 06:07) (150/78 - 168/79)  BP(mean): --  RR: 20 (31 Aug 2020 06:07) (18 - 20)  SpO2: 95% (31 Aug 2020 06:07) (92% - 95%)    --------------------------------------------------------------------------------------------------  Inputs/Outputs:    29 Aug 2020 07:01  -  30 Aug 2020 07:00  --------------------------------------------------------  IN:    IV PiggyBack: 50 mL    Oral Fluid: 840 mL    sodium chloride 0.45%: 2400 mL  Total IN: 3290 mL    OUT:    Voided: 1950 mL  Total OUT: 1950 mL    Total NET: 1340 mL      30 Aug 2020 07:01  -  31 Aug 2020 06:40  --------------------------------------------------------  IN:    Oral Fluid: 1080 mL  Total IN: 1080 mL    OUT:    Voided: 2050 mL  Total OUT: 2050 mL    Total NET: -970 mL        --------------------------------------------------------------------------------------------------  Laboratories:                        9.7    4.40  )-----------( 109      ( 29 Aug 2020 07:07 )             29.3     LIVER FUNCTIONS - ( 30 Aug 2020 09:37 )  Alb: x     / Pro: 5.5 g/dL / ALK PHOS: x     / ALT: x     / AST: x     / GGT: x             Culture - Blood (collected 28 Aug 2020 15:19)  Source: .Blood Blood-Peripheral  Preliminary Report (29 Aug 2020 16:01):    No growth to date.    Culture - Blood (collected 28 Aug 2020 15:19)  Source: .Blood Blood-Venous  Preliminary Report (29 Aug 2020 16:01):    No growth to date.      29 Aug 2020 07:05    141    |  101    |  84     ----------------------------<  95     3.7     |  21     |  5.04     Ca    8.3        29 Aug 2020 07:05  Phos  4.8       29 Aug 2020 07:05  Mg     2.7       29 Aug 2020 07:05    TPro  5.5    /  Alb  x      /  TBili  x      /  DBili  x      /  AST  x      /  ALT  x      /  AlkPhos  x      30 Aug 2020 09:37        --------------------------------------------------------------------------------------------------  Physical Exam:  General: AAOx3, NAD, resting comfortably   HEENT: NC/AT  Respiratory: no increased work of breathing   Abdomen: soft, nontender, nondistended, no rebound or guarding; negative callahan's sign  Extremities: warm and well perfused  --------------------------------------------------------------------------------------------------  Medications:  MEDICATIONS  (STANDING):  calcitriol   Capsule 0.25 MICROGram(s) Oral daily  carvedilol 6.25 milliGRAM(s) Oral every 12 hours  cefepime   IVPB 1000 milliGRAM(s) IV Intermittent every 24 hours  colchicine 0.3 milliGRAM(s) Oral daily  dipyridamole 200 mG/aspirin 25 mG 1 Capsule(s) Oral two times a day  heparin   Injectable 5000 Unit(s) SubCutaneous every 12 hours  sodium chloride 0.45% 1000 milliLiter(s) (100 mL/Hr) IV Continuous <Continuous>    MEDICATIONS  (PRN):  acetaminophen   Tablet .. 650 milliGRAM(s) Oral every 6 hours PRN Temp greater or equal to 38C (100.4F), Moderate Pain (4 - 6)

## 2020-08-31 NOTE — PROGRESS NOTE ADULT - PROBLEM SELECTOR PLAN 3
JUAN RAMON on CKD  -Creatinine now downtrending   -L renal artery stenosis seen on duplex. Possible percutaneous intervention.  -Renal f/u.

## 2020-08-31 NOTE — PROGRESS NOTE ADULT - ASSESSMENT
80yom pmhx of HTN HLD ,PVD prostate CA bib ems for weakness and fever for past 2 days. unable to get off the couch since yesterday. No chest pain no sob, no nausea or vomiting. +incontinence of urine; seen by PMD this week for arthritis and started on Tramadol.     Problem/Plan - 1:  ·  Problem: Sepsis.  Plan: Hemodynamically stable. S/P cultures . IV Abxs. ID help appreciated.     Clinically better.      Problem/Plan - 2:  ·  Problem: Pneumonia.  Plan: IV Abxs. Pulmonary following.      Problem/Plan - 3:  ·  Problem: Left leg pain with PVD .  Plan:  Vascular helping.      Problem/Plan - 4:  ·  Problem: CKD (chronic kidney disease) stage 3, GFR 30-59 ml/min with Metabolic Acidosis with JUAN RAMON  .  Plan: Renal following.   Creatinine  trending down.      Problem/Plan - 5:  ·  Problem: Hypertension.  Plan: BP meds with hold parameters. Doppler noted.  RA Stenosis so vascular consulted.      Problem/Plan - 6:  Problem: Hypercholesterolemia. Plan: Statin.     Problem/Plan - 7:  ·  Problem: Gout.  Plan: Colchicine.      Problem/Plan - 8:  ·  Problem: Anemia, chronic disease with Thrombocytopenia .  Plan: Work up noted.  Hematology following.      Problem/Plan - 9:  ·  Problem:  PC (prostate cancer).  Plan: Outpt follow up.      Problem/Plan - 10:  Problem: Cholelithiasis. Plan; LFT trending down . so will hold statin and getting  US RUQ.   Surgery consult noted.  May need MRI .   Rpt labs pending.

## 2020-08-31 NOTE — PROGRESS NOTE ADULT - ASSESSMENT
81yo M pmhx of HTN, HLD, TIA/possible stroke in 2012 s/p carotid endarterectomy, past inferior MI ,prostate CA s/p prostatectomy, OA of hips and legs, CKD3, gout, presented with LLE pain and difficulty ambulating.     Plan:  - recommend pletal 50 bid  bedside d/w pt and pt's daughter who inquired re risks and benefits of renal intervention   above reviewed w them and advised them to d/w renal the degree if acute vs chronic renal insuff that the pt currently has which will determine the indication for renal intervention  will follow

## 2020-08-31 NOTE — PROGRESS NOTE ADULT - SUBJECTIVE AND OBJECTIVE BOX
INTERVAL HPI/OVERNIGHT EVENTS: I feel fine.   Vital Signs Last 24 Hrs  T(C): 36.7 (31 Aug 2020 12:28), Max: 37 (30 Aug 2020 20:07)  T(F): 98.1 (31 Aug 2020 12:28), Max: 98.6 (30 Aug 2020 20:07)  HR: 68 (31 Aug 2020 12:28) (67 - 73)  BP: 171/76 (31 Aug 2020 12:28) (149/76 - 171/76)  BP(mean): --  RR: 18 (31 Aug 2020 12:28) (18 - 20)  SpO2: 97% (31 Aug 2020 12:28) (92% - 97%)  I&O's Summary    30 Aug 2020 07:01  -  31 Aug 2020 07:00  --------------------------------------------------------  IN: 2280 mL / OUT: 2050 mL / NET: 230 mL    31 Aug 2020 07:01  -  31 Aug 2020 13:16  --------------------------------------------------------  IN: 350 mL / OUT: 650 mL / NET: -300 mL      MEDICATIONS  (STANDING):  calcitriol   Capsule 0.25 MICROGram(s) Oral daily  carvedilol 6.25 milliGRAM(s) Oral every 12 hours  cefepime   IVPB 1000 milliGRAM(s) IV Intermittent every 24 hours  colchicine 0.3 milliGRAM(s) Oral daily  dipyridamole 200 mG/aspirin 25 mG 1 Capsule(s) Oral two times a day  heparin   Injectable 5000 Unit(s) SubCutaneous every 12 hours  sodium chloride 0.45% 1000 milliLiter(s) (100 mL/Hr) IV Continuous <Continuous>    MEDICATIONS  (PRN):  acetaminophen   Tablet .. 650 milliGRAM(s) Oral every 6 hours PRN Temp greater or equal to 38C (100.4F), Moderate Pain (4 - 6)    LABS:                        9.7    2.94  )-----------( 141      ( 31 Aug 2020 06:49 )             29.6     08-31    141  |  102  |  69<H>  ----------------------------<  122<H>  3.7   |  25  |  3.13<H>    Ca    8.3<L>      31 Aug 2020 06:49    TPro  6.2  /  Alb  3.4  /  TBili  0.4  /  DBili  x   /  AST  185<H>  /  ALT  358<H>  /  AlkPhos  179<H>  08-31        CAPILLARY BLOOD GLUCOSE              REVIEW OF SYSTEMS:  CONSTITUTIONAL: No fever, weight loss, or fatigue  EYES: No eye pain, visual disturbances, or discharge  ENMT:  No difficulty hearing, tinnitus, vertigo; No sinus or throat pain  NECK: No pain or stiffness  RESPIRATORY: No cough, wheezing, chills or hemoptysis; No shortness of breath  CARDIOVASCULAR: No chest pain, palpitations, dizziness, or leg swelling  GASTROINTESTINAL: No abdominal or epigastric pain. No nausea, vomiting, or hematemesis; No diarrhea or constipation. No melena or hematochezia.  GENITOURINARY: No dysuria, frequency, hematuria, or incontinence  NEUROLOGICAL: No headaches, memory loss, loss of strength, numbness, or tremors      Consultant(s) Notes Reviewed:  [x ] YES  [ ] NO    PHYSICAL EXAM:  GENERAL: NAD, well-groomed, well-developed,not in any distress ,  HEAD:  Atraumatic, Normocephalic  EYES: EOMI, PERRLA, conjunctiva and sclera clear  ENMT: No tonsillar erythema, exudates, or enlargement; Moist mucous membranes, Good dentition, No lesions  NECK: Supple, No JVD, Normal thyroid  NERVOUS SYSTEM:  Alert & Oriented X3, No focal deficit   CHEST/LUNG: Good air entry bilateral with no  rales, rhonchi, wheezing, or rubs  HEART: Regular rate and rhythm; No murmurs, rubs, or gallops  ABDOMEN: Soft, Nontender, Nondistended; Bowel sounds present  EXTREMITIES:  2+ Peripheral Pulses, No clubbing, cyanosis, or edema    Care Discussed with Consultants/Other Providers [ x] YES  [ ] NO

## 2020-08-31 NOTE — PROGRESS NOTE ADULT - SUBJECTIVE AND OBJECTIVE BOX
Patient is a 80y old  Male who presents with a chief complaint of weakness and fever (31 Aug 2020 14:37)      Vascular Surgery Attending Progress Note    Interval HPI: pt states no new c/o  pt states that he is festus w interv cardiology for renal intervention     Medications:  acetaminophen   Tablet .. 650 milliGRAM(s) Oral every 6 hours PRN  calcitriol   Capsule 0.25 MICROGram(s) Oral daily  carvedilol 6.25 milliGRAM(s) Oral every 12 hours  cefepime   IVPB 1000 milliGRAM(s) IV Intermittent every 24 hours  colchicine 0.3 milliGRAM(s) Oral daily  dipyridamole 200 mG/aspirin 25 mG 1 Capsule(s) Oral two times a day  heparin   Injectable 5000 Unit(s) SubCutaneous every 12 hours  sodium chloride 0.9%. 1000 milliLiter(s) IV Continuous <Continuous>      Vital Signs Last 24 Hrs  T(C): 36.8 (31 Aug 2020 16:26), Max: 37 (30 Aug 2020 20:07)  T(F): 98.3 (31 Aug 2020 16:26), Max: 98.6 (30 Aug 2020 20:07)  HR: 73 (31 Aug 2020 18:19) (62 - 73)  BP: 156/71 (31 Aug 2020 18:19) (149/76 - 178/82)  BP(mean): --  RR: 18 (31 Aug 2020 16:26) (18 - 20)  SpO2: 96% (31 Aug 2020 16:26) (92% - 97%)  I&O's Summary    30 Aug 2020 07:01  -  31 Aug 2020 07:00  --------------------------------------------------------  IN: 2280 mL / OUT: 2050 mL / NET: 230 mL    31 Aug 2020 07:01  -  31 Aug 2020 19:03  --------------------------------------------------------  IN: 1140 mL / OUT: 950 mL / NET: 190 mL        Physical Exam:  Neuro  A&Ox3 VSS  Vascular:  le vasc exam  stable     LABS:                        9.7    2.94  )-----------( 141      ( 31 Aug 2020 06:49 )             29.6     08-31    141  |  102  |  69<H>  ----------------------------<  122<H>  3.7   |  25  |  3.13<H>    Ca    8.3<L>      31 Aug 2020 06:49    TPro  6.2  /  Alb  3.4  /  TBili  0.4  /  DBili  x   /  AST  185<H>  /  ALT  358<H>  /  AlkPhos  179<H>  08-31        PRISCILLA BAINS MD  [phone] [phone]

## 2020-08-31 NOTE — CONSULT NOTE ADULT - ATTENDING COMMENTS
Unilateral renal artery stenosis in solitary functioning kidney with HTN and now renal dysfunction  D/W Dr. Cabral  Favor renal artery intervention/stent in attempt to salvage kidney  Scheduled for Wednesday   Continue IV fluid hydration for now    Barry
I Agus Alarcon MD performed a history and physical exam of the patient and discussed  the findings and plan with the house officer. I reviewed the resident note and agree with the findings and plan

## 2020-08-31 NOTE — PROGRESS NOTE ADULT - SUBJECTIVE AND OBJECTIVE BOX
Follow-up Pulm Progress Note    No new respiratory events overnight.  Denies SOB/CP.     Medications:  MEDICATIONS  (STANDING):  calcitriol   Capsule 0.25 MICROGram(s) Oral daily  carvedilol 6.25 milliGRAM(s) Oral every 12 hours  cefepime   IVPB 1000 milliGRAM(s) IV Intermittent every 24 hours  colchicine 0.3 milliGRAM(s) Oral daily  dipyridamole 200 mG/aspirin 25 mG 1 Capsule(s) Oral two times a day  heparin   Injectable 5000 Unit(s) SubCutaneous every 12 hours  sodium chloride 0.45% 1000 milliLiter(s) (100 mL/Hr) IV Continuous <Continuous>    MEDICATIONS  (PRN):  acetaminophen   Tablet .. 650 milliGRAM(s) Oral every 6 hours PRN Temp greater or equal to 38C (100.4F), Moderate Pain (4 - 6)          Vital Signs Last 24 Hrs  T(C): 36.7 (31 Aug 2020 12:28), Max: 37 (30 Aug 2020 20:07)  T(F): 98.1 (31 Aug 2020 12:28), Max: 98.6 (30 Aug 2020 20:07)  HR: 70 (31 Aug 2020 14:16) (67 - 73)  BP: 171/76 (31 Aug 2020 14:16) (149/76 - 171/76)  BP(mean): --  RR: 18 (31 Aug 2020 14:16) (18 - 20)  SpO2: 97% (31 Aug 2020 14:16) (92% - 97%)          08-30 @ 07:01  -  08-31 @ 07:00  --------------------------------------------------------  IN: 2280 mL / OUT: 2050 mL / NET: 230 mL          LABS:                        9.7    2.94  )-----------( 141      ( 31 Aug 2020 06:49 )             29.6     08-31    141  |  102  |  69<H>  ----------------------------<  122<H>  3.7   |  25  |  3.13<H>    Ca    8.3<L>      31 Aug 2020 06:49    TPro  6.2  /  Alb  3.4  /  TBili  0.4  /  DBili  x   /  AST  185<H>  /  ALT  358<H>  /  AlkPhos  179<H>  08-31        ECHO -- 08-31 @ 08:48  Anti SS-1 --  Anti SS-2 --  Anti RNP --  RF <10 08-31 @ 08:48    Atypical ANCA -- 08-31 @ 08:48  c-ANCA titer -- 08-31 @ 08:48  c-ANCA -- 08-31 @ 08:48  p-ANCA -- 08-31 @ 08:48        CULTURES:     Culture - Blood (collected 08-28-20 @ 15:19)  Source: .Blood Blood-Peripheral  Preliminary Report (08-29-20 @ 16:01):    No growth to date.    Culture - Blood (collected 08-28-20 @ 15:19)  Source: .Blood Blood-Venous  Preliminary Report (08-29-20 @ 16:01):    No growth to date.    Culture - Blood (collected 08-26-20 @ 23:06)  Source: .Blood Blood-Peripheral  Preliminary Report (08-28-20 @ 01:02):    No growth to date.    Culture - Blood (collected 08-26-20 @ 23:06)  Source: .Blood Blood-Peripheral  Preliminary Report (08-28-20 @ 01:02):    No growth to date.        Culture - Urine (collected 08-27-20 @ 00:37)  Source: .Urine Clean Catch (Midstream)  Final Report (08-27-20 @ 20:27):    No growth        Physical Examination:  PULM: improved crackles L base  CVS: RRR    RADIOLOGY REVIEWED  CT chest: < from: CT Chest No Cont (08.26.20 @ 19:07) >  FINDINGS:  CHEST:  LUNGS AND LARGE AIRWAYS: Patent central airways. Left lower lobe consolidation.  PLEURA: No pleural effusion.  VESSELS: Atherosclerotic changes of the aorta and coronary arteries.  HEART: Heart size is normal. No pericardial effusion.  MEDIASTINUM AND CARLTON: No lymphadenopathy.  CHEST WALL AND LOWER NECK: Within normal limits.    ABDOMEN AND PELVIS:  LIVER: Within normal limits.  BILE DUCTS: Normal caliber.  GALLBLADDER: Cholelithiasis.  SPLEEN: Within normal limits.  PANCREAS: Within normal limits.  ADRENALS: Within normal limits.  KIDNEYS/URETERS: Atrophic right kidney. Left renal cyst.    BLADDER: Contains a focus of air. Correlate for recent instrumentation.  REPRODUCTIVE ORGANS: Prostate not visualized. Correlate with surgical history.    BOWEL: Colonic diverticulosis without diverticulitis. Nobowel obstruction. Appendix is normal.  PERITONEUM: No ascites.  VESSELS: Atherosclerotic changes.  RETROPERITONEUM/LYMPH NODES: No lymphadenopathy.  ABDOMINAL WALL: Within normal limits.  BONES: Degenerative changes.    IMPRESSION:  Lower lobe consolidation concerning for pneumonia.    No acute pathology in the abdomen or pelvis.    < end of copied text >

## 2020-08-31 NOTE — CONSULT NOTE ADULT - SUBJECTIVE AND OBJECTIVE BOX
Vascular Cardiology Consult Note    DIRECT SERVICE NUMBER:  [phone]           EMAIL [email or web address]   OFFICE [phone]    CC: L leg weakness    HPI:    Briefly, 81 y/o M w/ PMH of HTN, HLD, prostate CA, PVD c/o LLE pain while attemping to walk. As per primary team, he was endorsing fevers and weakness x 2D, and w/u has revealed CT chest w/ L lung consolidation concerning for PNA for which he is receiving cefepime. As per pt, he came to ED because he was having severe LLE pain. Pt unable to ID any inciting cause/event. Pt states that pain was sharp. No relation to exertion; no obvious aggravating/alleviating factors. Pain was constant. He does report fatigue and aching after ambulating short distances; he states he was following w/ PMD who advised that correction of his condition could cause worsening kidney injury so he opted for conservative mgmt.    While admitted, pt noted to have JUAN RAMON and elev BPs. He had renal US w/ duplex, which revealed sig L renal A stenosis.       Allergies    No Known Allergies    Intolerances    	    MEDICATIONS:  carvedilol 6.25 milliGRAM(s) Oral every 12 hours  dipyridamole 200 mG/aspirin 25 mG 1 Capsule(s) Oral two times a day  heparin   Injectable 5000 Unit(s) SubCutaneous every 12 hours    cefepime   IVPB 1000 milliGRAM(s) IV Intermittent every 24 hours      acetaminophen   Tablet .. 650 milliGRAM(s) Oral every 6 hours PRN      colchicine 0.3 milliGRAM(s) Oral daily    calcitriol   Capsule 0.25 MICROGram(s) Oral daily  sodium chloride 0.45% 1000 milliLiter(s) IV Continuous <Continuous>      PAST MEDICAL & SURGICAL HISTORY:  Gout  PC (prostate cancer): s/p surgical resection 3 years ago  Hypercholesterolemia  HTN - Hypertension  H/O carotid endarterectomy  H/O prostatectomy      FAMILY HISTORY:  No pertinent family history in first degree relatives      SOCIAL HISTORY:  unchanged    REVIEW OF SYSTEMS:  CONSTITUTIONAL: No fever, weight loss, or fatigue  EYES: No eye pain, visual disturbances, or discharge  ENT:  No difficulty hearing, tinnitus, vertigo; No sinus or throat pain  NECK: No pain or stiffness  RESPIRATORY:    CARDIOVASCULAR:    GASTROINTESTINAL: No abdominal or epigastric pain. No nausea, vomiting, or hematemesis; No diarrhea or constipation. No melena or hematochezia.  GENITOURINARY: No dysuria, frequency, hematuria, or incontinence  NEUROLOGICAL: No headaches, memory loss, loss of strength, numbness, or tremors  SKIN:   LYMPH Nodes: No enlarged glands  ENDOCRINE: No heat or cold intolerance; No hair loss  MUSCULOSKELETAL: No joint pain or swelling; No muscle, back, or extremity pain  PSYCHIATRIC: No depression, anxiety, mood swings, or difficulty sleeping  HEME/LYMPH: No easy bruising, or bleeding gums  ALLERGY AND IMMUNOLOGIC: No hives or eczema	    [ x] All others negative	  [ ] Unable to obtain    PHYSICAL EXAM:  T(C): 36.7 (08-31-20 @ 12:28), Max: 37 (08-30-20 @ 20:07)  HR: 68 (08-31-20 @ 12:28) (67 - 73)  BP: 171/76 (08-31-20 @ 12:28) (149/76 - 171/76)  RR: 18 (08-31-20 @ 12:28) (18 - 20)  SpO2: 97% (08-31-20 @ 12:28) (92% - 97%)  Wt(kg): --  I&O's Summary    30 Aug 2020 07:01  -  31 Aug 2020 07:00  --------------------------------------------------------  IN: 2280 mL / OUT: 2050 mL / NET: 230 mL    31 Aug 2020 07:01  -  31 Aug 2020 14:02  --------------------------------------------------------  IN: 350 mL / OUT: 650 mL / NET: -300 mL        Appearance: NAD  HEENT:  Normal oral mucosa, PERRL, EOMI	  Carotid:   Right: No bruits Left: L carotid bruit  Lymphatic: No lymphadenopathy  Cardiovascular: Normal S1, S2. RRR. No MRG.  Respiratory: CTAB. No WRR.  Psychiatry:  AAO x 3  Gastrointestinal:  Soft, Non-tender, + BS	  Skin: No rashes, No ecchymoses, No cyanosis	  Neurologic: Non-focal  Extremities: WWP. No edema.    LABS:	    CBC Full  -  ( 31 Aug 2020 06:49 )  WBC Count : 2.94 K/uL  Hemoglobin : 9.7 g/dL  Hematocrit : 29.6 %  Platelet Count - Automated : 141 K/uL  Mean Cell Volume : 109.6 fl  Mean Cell Hemoglobin : 35.9 pg  Mean Cell Hemoglobin Concentration : 32.8 gm/dL  Auto Neutrophil # : x  Auto Lymphocyte # : x  Auto Monocyte # : x  Auto Eosinophil # : x  Auto Basophil # : x  Auto Neutrophil % : x  Auto Lymphocyte % : x  Auto Monocyte % : x  Auto Eosinophil % : x  Auto Basophil % : x    08-31    141  |  102  |  69<H>  ----------------------------<  122<H>  3.7   |  25  |  3.13<H>    Ca    8.3<L>      31 Aug 2020 06:49    TPro  6.2  /  Alb  3.4  /  TBili  0.4  /  DBili  x   /  AST  185<H>  /  ALT  358<H>  /  AlkPhos  179<H>  08-31  TPro  5.5<L>  /  Alb  x   /  TBili  x   /  DBili  x   /  AST  x   /  ALT  x   /  AlkPhos  x   08-30          Assessment:  81 y/o M w/ PMH of HTN, HLD, prostate CA, s/p L CEA, PVD c/o LLE pain while attempting to walk. Also found to have sig L renal A stenosis.           Plan:  1. L renal A stenosis - Sig L renal A stenosis. Pt w/ solitary functional kidney and sig JUAN RAMON; likely unable to control BP w/ RAASi given JUAN RAMON. Intervention w/ stent may be reasonable.  2. PAD - Pt w/ severe PAD on imaging that is symptomatic.    -Renal duplex results noted  -Favor percutaneous intervention; hopefully can optimize renal fxn prior to any planned procedure  -C/w Aggrenox  -Would start atorva 40 once liver tests normalize  -C/w trend Cr; renally dose meds. Avoid Ntoxins.  -No e/o CHF; if sxs persist, can consider cilostazol for symptomatic relief of PAD         Thank you      Vascular Cardiology Service    Please call with any questions:   DIRECT SERVICE NUMBER:  [phone]  Office [phone]  email:  [email or web address] Vascular Cardiology Consult Note    DIRECT SERVICE NUMBER:  [phone]           EMAIL [email or web address]   OFFICE [phone]    CC: L leg weakness    HPI:    Briefly, 81 y/o M w/ PMH of HTN, HLD, prostate CA, PVD c/o LLE pain while attemping to walk. As per primary team, he was endorsing fevers and weakness x 2D, and w/u has revealed CT chest w/ L lung consolidation concerning for PNA for which he is receiving cefepime. As per pt, he came to ED because he was having severe LLE pain. Pt unable to ID any inciting cause/event. Pt states that pain was sharp. No relation to exertion; no obvious aggravating/alleviating factors. Pain was constant. He does report fatigue and aching after ambulating short distances; he states he was following w/ PMD who advised that correction of his condition could cause worsening kidney injury so he opted for conservative mgmt.    While admitted, pt noted to have JUAN RAMON and elev BPs. He had renal US w/ duplex, which revealed sig L renal A stenosis.       Allergies    No Known Allergies    Intolerances    	    MEDICATIONS:  carvedilol 6.25 milliGRAM(s) Oral every 12 hours  dipyridamole 200 mG/aspirin 25 mG 1 Capsule(s) Oral two times a day  heparin   Injectable 5000 Unit(s) SubCutaneous every 12 hours    cefepime   IVPB 1000 milliGRAM(s) IV Intermittent every 24 hours      acetaminophen   Tablet .. 650 milliGRAM(s) Oral every 6 hours PRN      colchicine 0.3 milliGRAM(s) Oral daily    calcitriol   Capsule 0.25 MICROGram(s) Oral daily  sodium chloride 0.45% 1000 milliLiter(s) IV Continuous <Continuous>      PAST MEDICAL & SURGICAL HISTORY:  Gout  PC (prostate cancer): s/p surgical resection 3 years ago  Hypercholesterolemia  HTN - Hypertension  H/O carotid endarterectomy  H/O prostatectomy      FAMILY HISTORY:  No pertinent family history in first degree relatives      SOCIAL HISTORY:  unchanged    REVIEW OF SYSTEMS:  CONSTITUTIONAL: No fever, weight loss, or fatigue  EYES: No eye pain, visual disturbances, or discharge  ENT:  No difficulty hearing, tinnitus, vertigo; No sinus or throat pain  NECK: No pain or stiffness  RESPIRATORY:  No dyspnea  CARDIOVASCULAR:  No chest pain  GASTROINTESTINAL: No abdominal or epigastric pain. No nausea, vomiting, or hematemesis; No diarrhea or constipation. No melena or hematochezia.  GENITOURINARY: No dysuria, frequency, hematuria, or incontinence  NEUROLOGICAL: No headaches, memory loss, loss of strength, numbness, or tremors  SKIN: No rash  LYMPH Nodes: No enlarged glands  ENDOCRINE: No heat or cold intolerance; No hair loss  MUSCULOSKELETAL: No joint pain or swelling; No muscle, back, or extremity pain  PSYCHIATRIC: No depression, anxiety, mood swings, or difficulty sleeping  HEME/LYMPH: No easy bruising, or bleeding gums  ALLERGY AND IMMUNOLOGIC: No hives or eczema	    [ x] All others negative	  [ ] Unable to obtain    PHYSICAL EXAM:  T(C): 36.7 (08-31-20 @ 12:28), Max: 37 (08-30-20 @ 20:07)  HR: 68 (08-31-20 @ 12:28) (67 - 73)  BP: 171/76 (08-31-20 @ 12:28) (149/76 - 171/76)  RR: 18 (08-31-20 @ 12:28) (18 - 20)  SpO2: 97% (08-31-20 @ 12:28) (92% - 97%)  Wt(kg): --  I&O's Summary    30 Aug 2020 07:01  -  31 Aug 2020 07:00  --------------------------------------------------------  IN: 2280 mL / OUT: 2050 mL / NET: 230 mL    31 Aug 2020 07:01  -  31 Aug 2020 14:02  --------------------------------------------------------  IN: 350 mL / OUT: 650 mL / NET: -300 mL        Appearance: NAD  HEENT:  Normal oral mucosa, PERRL, EOMI	  Carotid: Right: No bruits Left: L carotid bruit  Lymphatic: No lymphadenopathy  Cardiovascular: Normal S1, S2. RRR. No MRG.  Respiratory: CTAB. No WRR.  Psychiatry:  AAO x 3  Gastrointestinal:  Soft, Non-tender, + BS	  Skin: No rashes, No ecchymoses, No cyanosis	  Neurologic: Non-focal  Extremities: WWP. No edema.    LABS:	    CBC Full  -  ( 31 Aug 2020 06:49 )  WBC Count : 2.94 K/uL  Hemoglobin : 9.7 g/dL  Hematocrit : 29.6 %  Platelet Count - Automated : 141 K/uL  Mean Cell Volume : 109.6 fl  Mean Cell Hemoglobin : 35.9 pg  Mean Cell Hemoglobin Concentration : 32.8 gm/dL  Auto Neutrophil # : x  Auto Lymphocyte # : x  Auto Monocyte # : x  Auto Eosinophil # : x  Auto Basophil # : x  Auto Neutrophil % : x  Auto Lymphocyte % : x  Auto Monocyte % : x  Auto Eosinophil % : x  Auto Basophil % : x    08-31    141  |  102  |  69<H>  ----------------------------<  122<H>  3.7   |  25  |  3.13<H>    Ca    8.3<L>      31 Aug 2020 06:49    TPro  6.2  /  Alb  3.4  /  TBili  0.4  /  DBili  x   /  AST  185<H>  /  ALT  358<H>  /  AlkPhos  179<H>  08-31  TPro  5.5<L>  /  Alb  x   /  TBili  x   /  DBili  x   /  AST  x   /  ALT  x   /  AlkPhos  x   08-30          Assessment:  81 y/o M w/ PMH of HTN, HLD, prostate CA, s/p L CEA, PVD c/o LLE pain while attempting to walk. Also found to have sig L renal A stenosis.           Plan:  1. L renal A stenosis - Sig L renal A stenosis. Pt w/ solitary functional kidney and sig JUAN RAMON; likely unable to control BP w/ RAASi given JUAN RAMON. Intervention w/ stent may be reasonable.  2. PAD - Pt w/ severe PAD on imaging that is symptomatic.    1. Favor percutaneous intervention on Wednesday 9/2; hopefully can optimize renal fxn prior to any planned procedure  2. Would order standing IVF to optimize renal fxn. Monitor resp status while receiving IVF  3. C/w Aggrenox  4. Would start atorva 40 once liver tests normalize  5. C/w trend Cr; renally dose meds. Avoid Ntoxins.  6. No e/o CHF; if LE sxs persist, can consider cilostazol for symptomatic relief of PAD         Thank you      Vascular Cardiology Service    Please call with any questions:   DIRECT SERVICE NUMBER:  [phone]  Office [phone]  email:  [email or web address]

## 2020-09-01 LAB
ALBUMIN SERPL ELPH-MCNC: 3.5 G/DL — SIGNIFICANT CHANGE UP (ref 3.3–5)
ALP SERPL-CCNC: 155 U/L — HIGH (ref 40–120)
ALT FLD-CCNC: 276 U/L — HIGH (ref 10–45)
ANA TITR SER: NEGATIVE — SIGNIFICANT CHANGE UP
ANION GAP SERPL CALC-SCNC: 13 MMOL/L — SIGNIFICANT CHANGE UP (ref 5–17)
AST SERPL-CCNC: 110 U/L — HIGH (ref 10–40)
BILIRUB SERPL-MCNC: 0.5 MG/DL — SIGNIFICANT CHANGE UP (ref 0.2–1.2)
BUN SERPL-MCNC: 60 MG/DL — HIGH (ref 7–23)
CALCIUM SERPL-MCNC: 8.1 MG/DL — LOW (ref 8.4–10.5)
CHLORIDE SERPL-SCNC: 107 MMOL/L — SIGNIFICANT CHANGE UP (ref 96–108)
CO2 SERPL-SCNC: 23 MMOL/L — SIGNIFICANT CHANGE UP (ref 22–31)
CREAT SERPL-MCNC: 2.77 MG/DL — HIGH (ref 0.5–1.3)
CULTURE RESULTS: SIGNIFICANT CHANGE UP
CULTURE RESULTS: SIGNIFICANT CHANGE UP
GLUCOSE SERPL-MCNC: 124 MG/DL — HIGH (ref 70–99)
HCT VFR BLD CALC: 29.4 % — LOW (ref 39–50)
HGB BLD-MCNC: 9.2 G/DL — LOW (ref 13–17)
MCHC RBC-ENTMCNC: 31.3 GM/DL — LOW (ref 32–36)
MCHC RBC-ENTMCNC: 34.8 PG — HIGH (ref 27–34)
MCV RBC AUTO: 111.4 FL — HIGH (ref 80–100)
NRBC # BLD: 0 /100 WBCS — SIGNIFICANT CHANGE UP (ref 0–0)
PLATELET # BLD AUTO: 169 K/UL — SIGNIFICANT CHANGE UP (ref 150–400)
POTASSIUM SERPL-MCNC: 4.1 MMOL/L — SIGNIFICANT CHANGE UP (ref 3.5–5.3)
POTASSIUM SERPL-SCNC: 4.1 MMOL/L — SIGNIFICANT CHANGE UP (ref 3.5–5.3)
PROT SERPL-MCNC: 6 G/DL — SIGNIFICANT CHANGE UP (ref 6–8.3)
RBC # BLD: 2.64 M/UL — LOW (ref 4.2–5.8)
RBC # FLD: 17.6 % — HIGH (ref 10.3–14.5)
SODIUM SERPL-SCNC: 143 MMOL/L — SIGNIFICANT CHANGE UP (ref 135–145)
SPECIMEN SOURCE: SIGNIFICANT CHANGE UP
SPECIMEN SOURCE: SIGNIFICANT CHANGE UP
WBC # BLD: 3.11 K/UL — LOW (ref 3.8–10.5)
WBC # FLD AUTO: 3.11 K/UL — LOW (ref 3.8–10.5)

## 2020-09-01 PROCEDURE — 99232 SBSQ HOSP IP/OBS MODERATE 35: CPT

## 2020-09-01 PROCEDURE — 99233 SBSQ HOSP IP/OBS HIGH 50: CPT

## 2020-09-01 RX ORDER — CLOPIDOGREL BISULFATE 75 MG/1
75 TABLET, FILM COATED ORAL DAILY
Refills: 0 | Status: DISCONTINUED | OUTPATIENT
Start: 2020-09-02 | End: 2020-09-04

## 2020-09-01 RX ORDER — CLOPIDOGREL BISULFATE 75 MG/1
600 TABLET, FILM COATED ORAL ONCE
Refills: 0 | Status: COMPLETED | OUTPATIENT
Start: 2020-09-01 | End: 2020-09-01

## 2020-09-01 RX ORDER — AMLODIPINE BESYLATE 2.5 MG/1
5 TABLET ORAL DAILY
Refills: 0 | Status: DISCONTINUED | OUTPATIENT
Start: 2020-09-01 | End: 2020-09-04

## 2020-09-01 RX ORDER — ASPIRIN/CALCIUM CARB/MAGNESIUM 324 MG
81 TABLET ORAL DAILY
Refills: 0 | Status: DISCONTINUED | OUTPATIENT
Start: 2020-09-01 | End: 2020-09-04

## 2020-09-01 RX ADMIN — CALCITRIOL 0.25 MICROGRAM(S): 0.5 CAPSULE ORAL at 13:17

## 2020-09-01 RX ADMIN — CEFEPIME 100 MILLIGRAM(S): 1 INJECTION, POWDER, FOR SOLUTION INTRAMUSCULAR; INTRAVENOUS at 13:18

## 2020-09-01 RX ADMIN — Medication 0.1 MILLIGRAM(S): at 18:44

## 2020-09-01 RX ADMIN — HEPARIN SODIUM 5000 UNIT(S): 5000 INJECTION INTRAVENOUS; SUBCUTANEOUS at 05:10

## 2020-09-01 RX ADMIN — Medication 81 MILLIGRAM(S): at 13:17

## 2020-09-01 RX ADMIN — SODIUM CHLORIDE 100 MILLILITER(S): 9 INJECTION INTRAMUSCULAR; INTRAVENOUS; SUBCUTANEOUS at 13:18

## 2020-09-01 RX ADMIN — AMLODIPINE BESYLATE 5 MILLIGRAM(S): 2.5 TABLET ORAL at 13:17

## 2020-09-01 RX ADMIN — CLOPIDOGREL BISULFATE 600 MILLIGRAM(S): 75 TABLET, FILM COATED ORAL at 13:17

## 2020-09-01 RX ADMIN — CARVEDILOL PHOSPHATE 6.25 MILLIGRAM(S): 80 CAPSULE, EXTENDED RELEASE ORAL at 04:15

## 2020-09-01 RX ADMIN — HEPARIN SODIUM 5000 UNIT(S): 5000 INJECTION INTRAVENOUS; SUBCUTANEOUS at 18:01

## 2020-09-01 RX ADMIN — CARVEDILOL PHOSPHATE 6.25 MILLIGRAM(S): 80 CAPSULE, EXTENDED RELEASE ORAL at 18:01

## 2020-09-01 RX ADMIN — ASPIRIN AND DIPYRIDAMOLE 1 CAPSULE(S): 25; 200 CAPSULE, EXTENDED RELEASE ORAL at 05:10

## 2020-09-01 RX ADMIN — Medication 0.3 MILLIGRAM(S): at 13:17

## 2020-09-01 RX ADMIN — SODIUM CHLORIDE 100 MILLILITER(S): 9 INJECTION INTRAMUSCULAR; INTRAVENOUS; SUBCUTANEOUS at 07:01

## 2020-09-01 NOTE — DIETITIAN INITIAL EVALUATION ADULT. - PERTINENT MEDS FT
MEDICATIONS  (STANDING):  amLODIPine   Tablet 5 milliGRAM(s) Oral daily  aspirin  chewable 81 milliGRAM(s) Oral daily  calcitriol   Capsule 0.25 MICROGram(s) Oral daily  carvedilol 6.25 milliGRAM(s) Oral every 12 hours  cefepime   IVPB 1000 milliGRAM(s) IV Intermittent every 24 hours  colchicine 0.3 milliGRAM(s) Oral daily  heparin   Injectable 5000 Unit(s) SubCutaneous every 12 hours  sodium chloride 0.9%. 1000 milliLiter(s) (100 mL/Hr) IV Continuous <Continuous>    MEDICATIONS  (PRN):  acetaminophen   Tablet .. 650 milliGRAM(s) Oral every 6 hours PRN Temp greater or equal to 38C (100.4F), Moderate Pain (4 - 6)

## 2020-09-01 NOTE — PROGRESS NOTE ADULT - ASSESSMENT
80yom pmhx of HTN HLD ,PVD prostate CA bib ems for weakness and fever for past 2 days. unable to get off the couch since yesterday. No chest pain no sob, no nausea or vomiting. +incontinence of urine; seen by PMD this week for arthritis and started on Tramadol.     Problem/Plan - 1:  ·  Problem: Sepsis.  Plan: Hemodynamically stable. S/P cultures . IV Abxs. ID help appreciated.     Clinically better.      Problem/Plan - 2:  ·  Problem: Pneumonia.  Plan: IV Abxs. Pulmonary following.      Problem/Plan - 3:  ·  Problem: Left leg pain with PVD .  Plan:  Vascular helping.      Problem/Plan - 4:  ·  Problem: CKD (chronic kidney disease) stage 3, GFR 30-59 ml/min with Metabolic Acidosis with JUAN RAMON  .  Plan: Renal following.   Creatinine  trending down.      Problem/Plan - 5:  ·  Problem: Hypertension.  Plan: BP meds with hold parameters. Doppler noted.  RA Stenosis so vascular consulted.      Problem/Plan - 6:  Problem: Hypercholesterolemia. Plan: Statin.     Problem/Plan - 7:  ·  Problem: Gout.  Plan: Colchicine.      Problem/Plan - 8:  ·  Problem: Anemia, chronic disease with Thrombocytopenia .  Plan: Work up noted.  Hematology following.      Problem/Plan - 9:  ·  Problem:  PC (prostate cancer).  Plan: Outpt follow up.      Problem/Plan - 10:  Problem: Cholelithiasis. Plan; LFT trending down . Holding statin and  US RUQ noted.    Surgery consult noted.       Problem/Plan - 11:  Problem: Renal Artery Stenosis . Plan; Awaiting Vascular intervention .

## 2020-09-01 NOTE — PROGRESS NOTE ADULT - SUBJECTIVE AND OBJECTIVE BOX
INTERVAL HPI/OVERNIGHT EVENTS: i feel fine and going for the procedure tomorrow.   Vital Signs Last 24 Hrs  T(C): 36.6 (01 Sep 2020 17:56), Max: 36.9 (01 Sep 2020 11:32)  T(F): 97.9 (01 Sep 2020 17:56), Max: 98.4 (01 Sep 2020 11:32)  HR: 73 (01 Sep 2020 17:56) (67 - 73)  BP: 185/66 (01 Sep 2020 17:56) (148/75 - 193/79)  BP(mean): --  RR: 18 (01 Sep 2020 17:56) (18 - 20)  SpO2: 97% (01 Sep 2020 17:56) (94% - 97%)  I&O's Summary    31 Aug 2020 07:01  -  01 Sep 2020 07:00  --------------------------------------------------------  IN: 2820 mL / OUT: 1900 mL / NET: 920 mL    01 Sep 2020 07:01  -  01 Sep 2020 20:00  --------------------------------------------------------  IN: 1700 mL / OUT: 950 mL / NET: 750 mL      MEDICATIONS  (STANDING):  amLODIPine   Tablet 5 milliGRAM(s) Oral daily  aspirin  chewable 81 milliGRAM(s) Oral daily  calcitriol   Capsule 0.25 MICROGram(s) Oral daily  carvedilol 6.25 milliGRAM(s) Oral every 12 hours  cefepime   IVPB 1000 milliGRAM(s) IV Intermittent every 24 hours  colchicine 0.3 milliGRAM(s) Oral daily  heparin   Injectable 5000 Unit(s) SubCutaneous every 12 hours  sodium chloride 0.9%. 1000 milliLiter(s) (100 mL/Hr) IV Continuous <Continuous>    MEDICATIONS  (PRN):  acetaminophen   Tablet .. 650 milliGRAM(s) Oral every 6 hours PRN Temp greater or equal to 38C (100.4F), Moderate Pain (4 - 6)  cloNIDine 0.1 milliGRAM(s) Oral every 6 hours PRN for sbp greater than 160    LABS:                        9.2    3.11  )-----------( 169      ( 01 Sep 2020 07:39 )             29.4     09-01    143  |  107  |  60<H>  ----------------------------<  124<H>  4.1   |  23  |  2.77<H>    Ca    8.1<L>      01 Sep 2020 07:39    TPro  6.0  /  Alb  3.5  /  TBili  0.5  /  DBili  x   /  AST  110<H>  /  ALT  276<H>  /  AlkPhos  155<H>  09-01        CAPILLARY BLOOD GLUCOSE              REVIEW OF SYSTEMS:  CONSTITUTIONAL: No fever, weight loss, or fatigue  EYES: No eye pain, visual disturbances, or discharge  ENMT:  No difficulty hearing, tinnitus, vertigo; No sinus or throat pain  NECK: No pain or stiffness  RESPIRATORY: No cough, wheezing, chills or hemoptysis; No shortness of breath  CARDIOVASCULAR: No chest pain, palpitations, dizziness, or leg swelling  GASTROINTESTINAL: No abdominal or epigastric pain. No nausea, vomiting, or hematemesis; No diarrhea or constipation. No melena or hematochezia.  GENITOURINARY: No dysuria, frequency, hematuria, or incontinence  NEUROLOGICAL: No headaches, memory loss, loss of strength, numbness, or tremors      RADIOLOGY & ADDITIONAL TESTS:    Consultant(s) Notes Reviewed:  [x ] YES  [ ] NO    PHYSICAL EXAM:  GENERAL: NAD, well-groomed, well-developed,not in any distress ,  HEAD:  Atraumatic, Normocephalic  EYES: EOMI, PERRLA, conjunctiva and sclera clear  ENMT: No tonsillar erythema, exudates, or enlargement; Moist mucous membranes, Good dentition, No lesions  NECK: Supple, No JVD, Normal thyroid  NERVOUS SYSTEM:  Alert & Oriented X3, No focal deficit   CHEST/LUNG: Good air entry bilateral with no  rales, rhonchi, wheezing, or rubs  HEART: Regular rate and rhythm; No murmurs, rubs, or gallops  ABDOMEN: Soft, Nontender, Nondistended; Bowel sounds present  EXTREMITIES:  2+ Peripheral Pulses, No clubbing, cyanosis, or edema  SKIN: No rashes or lesions    Care Discussed with Consultants/Other Providers [ x] YES  [ ] NO

## 2020-09-01 NOTE — DIETITIAN INITIAL EVALUATION ADULT. - PERTINENT LABORATORY DATA
09-01 Na 143 mmol/L Glu 124 mg/dL<H> K+ 4.1 mmol/L Cr  2.77 mg/dL<H> BUN 60 mg/dL<H> Phos n/a   Alb 3.5 g/dL PAB n/a   Hgb 9.2 g/dL<L> Hct 29.4 %<L>

## 2020-09-01 NOTE — DIETITIAN INITIAL EVALUATION ADULT. - PHYSICAL APPEARANCE
overweight/other (specify) Ht: 70in, Wt: 220lbs, BMI: 31.6kg/m2, IBW: 166lbs +/- 10%  Edema: 2+ (bilateral legs)   Skin per nursing flowsheets: no pressure injury documented

## 2020-09-01 NOTE — PROGRESS NOTE ADULT - SUBJECTIVE AND OBJECTIVE BOX
Follow-up Pulm Progress Note    No new respiratory events overnight.  Denies SOB/CP.     Medications:  MEDICATIONS  (STANDING):  amLODIPine   Tablet 5 milliGRAM(s) Oral daily  aspirin  chewable 81 milliGRAM(s) Oral daily  calcitriol   Capsule 0.25 MICROGram(s) Oral daily  carvedilol 6.25 milliGRAM(s) Oral every 12 hours  cefepime   IVPB 1000 milliGRAM(s) IV Intermittent every 24 hours  colchicine 0.3 milliGRAM(s) Oral daily  heparin   Injectable 5000 Unit(s) SubCutaneous every 12 hours  sodium chloride 0.9%. 1000 milliLiter(s) (100 mL/Hr) IV Continuous <Continuous>    MEDICATIONS  (PRN):  acetaminophen   Tablet .. 650 milliGRAM(s) Oral every 6 hours PRN Temp greater or equal to 38C (100.4F), Moderate Pain (4 - 6)          Vital Signs Last 24 Hrs  T(C): 36.8 (01 Sep 2020 13:09), Max: 36.9 (01 Sep 2020 11:32)  T(F): 98.3 (01 Sep 2020 13:09), Max: 98.4 (01 Sep 2020 11:32)  HR: 71 (01 Sep 2020 13:09) (62 - 73)  BP: 183/78 (01 Sep 2020 13:09) (137/76 - 193/79)  BP(mean): --  RR: 18 (01 Sep 2020 13:09) (18 - 20)  SpO2: 97% (01 Sep 2020 13:09) (94% - 97%)          08-31 @ 07:01  -  09-01 @ 07:00  --------------------------------------------------------  IN: 2820 mL / OUT: 1900 mL / NET: 920 mL          LABS:                        9.2    3.11  )-----------( 169      ( 01 Sep 2020 07:39 )             29.4     09-01    143  |  107  |  60<H>  ----------------------------<  124<H>  4.1   |  23  |  2.77<H>    Ca    8.1<L>      01 Sep 2020 07:39    TPro  6.0  /  Alb  3.5  /  TBili  0.5  /  DBili  x   /  AST  110<H>  /  ALT  276<H>  /  AlkPhos  155<H>  09-01              ECHO -- 08-31 @ 08:48  Anti SS-1 --  Anti SS-2 --  Anti RNP --  RF <10 08-31 @ 08:48    Atypical ANCA -- 08-31 @ 08:48  c-ANCA titer -- 08-31 @ 08:48  c-ANCA -- 08-31 @ 08:48  p-ANCA -- 08-31 @ 08:48        CULTURES:     Culture - Blood (collected 08-28-20 @ 15:19)  Source: .Blood Blood-Peripheral  Preliminary Report (08-29-20 @ 16:01):    No growth to date.    Culture - Blood (collected 08-28-20 @ 15:19)  Source: .Blood Blood-Venous  Preliminary Report (08-29-20 @ 16:01):    No growth to date.    Culture - Blood (collected 08-26-20 @ 23:06)  Source: .Blood Blood-Peripheral  Final Report (09-01-20 @ 01:00):    No Growth Final    Culture - Blood (collected 08-26-20 @ 23:06)  Source: .Blood Blood-Peripheral  Final Report (09-01-20 @ 01:00):    No Growth Final        Culture - Urine (collected 08-27-20 @ 00:37)  Source: .Urine Clean Catch (Midstream)  Final Report (08-27-20 @ 20:27):    No growth          Physical Examination:  PULM: Clear to auscultation bilaterally, no significant sputum production  CVS: RRR    RADIOLOGY REVIEWED  CT chest: < from: CT Chest No Cont (08.26.20 @ 19:07) >    FINDINGS:  CHEST:  LUNGS AND LARGE AIRWAYS: Patent central airways. Left lower lobe consolidation.  PLEURA: No pleural effusion.  VESSELS: Atherosclerotic changes of the aorta and coronary arteries.  HEART: Heart size is normal. No pericardial effusion.  MEDIASTINUM AND CARLTON: No lymphadenopathy.  CHEST WALL AND LOWER NECK: Within normal limits.    < end of copied text >

## 2020-09-01 NOTE — PROGRESS NOTE ADULT - ASSESSMENT
81yo M pmhx of HTN, HLD, TIA/possible stroke in 2012 s/p carotid endarterectomy, past inferior MI ,prostate CA s/p prostatectomy, OA of hips and legs, CKD3, gout, presented with LLE pain and difficulty ambulating.     Plan:  - recommend pletal 50 bid  will follow

## 2020-09-01 NOTE — DIETITIAN INITIAL EVALUATION ADULT. - OTHER INFO
Visited pt at bedside. Pt reports having a good appetite both PTA and in-house; consuming >75% of most meals. Pt denies any known food allergies or intolerances. Pt denies any chewing/swallowing difficulty, self-feeding difficulty, nausea/vomiting, constipation, or diarrhea. Last BM 9/1 per flowsheet and pt report. Pt Multivitamin and vitamin C supplementation at home, as well as CoQ10. Pt unable to recall UBW but endorses some possible weight gain.     Education: Encouraged heart healthy diet. Pt not interested in diet education at this time. Pt made aware that RD remains available.

## 2020-09-01 NOTE — PROGRESS NOTE ADULT - SUBJECTIVE AND OBJECTIVE BOX
Patient is a 80y old  Male who presents with a chief complaint of weakness and fever (01 Sep 2020 14:01)      Vascular Surgery Attending Progress Note    Interval HPI: pt w/o new c/o     Medications:  acetaminophen   Tablet .. 650 milliGRAM(s) Oral every 6 hours PRN  amLODIPine   Tablet 5 milliGRAM(s) Oral daily  aspirin  chewable 81 milliGRAM(s) Oral daily  calcitriol   Capsule 0.25 MICROGram(s) Oral daily  carvedilol 6.25 milliGRAM(s) Oral every 12 hours  cefepime   IVPB 1000 milliGRAM(s) IV Intermittent every 24 hours  cloNIDine 0.1 milliGRAM(s) Oral every 6 hours PRN  colchicine 0.3 milliGRAM(s) Oral daily  heparin   Injectable 5000 Unit(s) SubCutaneous every 12 hours  sodium chloride 0.9%. 1000 milliLiter(s) IV Continuous <Continuous>      Vital Signs Last 24 Hrs  T(C): 36.6 (01 Sep 2020 17:56), Max: 36.9 (01 Sep 2020 11:32)  T(F): 97.9 (01 Sep 2020 17:56), Max: 98.4 (01 Sep 2020 11:32)  HR: 73 (01 Sep 2020 17:56) (67 - 73)  BP: 185/66 (01 Sep 2020 17:56) (148/75 - 193/79)  BP(mean): --  RR: 18 (01 Sep 2020 17:56) (18 - 20)  SpO2: 97% (01 Sep 2020 17:56) (94% - 97%)  I&O's Summary    31 Aug 2020 07:01  -  01 Sep 2020 07:00  --------------------------------------------------------  IN: 2820 mL / OUT: 1900 mL / NET: 920 mL    01 Sep 2020 07:01  -  01 Sep 2020 20:19  --------------------------------------------------------  IN: 1700 mL / OUT: 950 mL / NET: 750 mL        Physical Exam:  Neuro  A&Ox3 VSS  Vascular:  stable le exam     LABS:                        9.2    3.11  )-----------( 169      ( 01 Sep 2020 07:39 )             29.4     09-01    143  |  107  |  60<H>  ----------------------------<  124<H>  4.1   |  23  |  2.77<H>    Ca    8.1<L>      01 Sep 2020 07:39    TPro  6.0  /  Alb  3.5  /  TBili  0.5  /  DBili  x   /  AST  110<H>  /  ALT  276<H>  /  AlkPhos  155<H>  09-01        PRISCILLA BAINS MD  [phone] Cell [phone]

## 2020-09-01 NOTE — PROGRESS NOTE ADULT - SUBJECTIVE AND OBJECTIVE BOX
infectious diseases progress note:    Patient is a 80y old  Male who presents with a chief complaint of weakness and fever (31 Aug 2020 19:03)        Sepsis        ROS:  CONSTITUTIONAL:  Negative fever or chills, feels well, good appetite  EYES:  Negative  blurry vision or double vision  CARDIOVASCULAR:  Negative for chest pain or palpitations  RESPIRATORY:  Negative for cough, wheezing, or SOB   GASTROINTESTINAL:  Negative for nausea, vomiting, diarrhea, constipation, or abdominal pain  GENITOURINARY:  Negative frequency, urgency or dysuria  NEUROLOGIC:  No headache, confusion, dizziness, lightheadedness    Allergies    No Known Allergies    Intolerances        ANTIBIOTICS/RELEVANT:  antimicrobials  cefepime   IVPB 1000 milliGRAM(s) IV Intermittent every 24 hours    immunologic:    OTHER:  acetaminophen   Tablet .. 650 milliGRAM(s) Oral every 6 hours PRN  calcitriol   Capsule 0.25 MICROGram(s) Oral daily  carvedilol 6.25 milliGRAM(s) Oral every 12 hours  colchicine 0.3 milliGRAM(s) Oral daily  dipyridamole 200 mG/aspirin 25 mG 1 Capsule(s) Oral two times a day  heparin   Injectable 5000 Unit(s) SubCutaneous every 12 hours  sodium chloride 0.9%. 1000 milliLiter(s) IV Continuous <Continuous>      Objective:  Vital Signs Last 24 Hrs  T(C): 36.5 (01 Sep 2020 04:11), Max: 36.8 (31 Aug 2020 16:26)  T(F): 97.7 (01 Sep 2020 04:11), Max: 98.3 (31 Aug 2020 16:26)  HR: 69 (01 Sep 2020 04:11) (62 - 73)  BP: 168/76 (01 Sep 2020 05:05) (137/76 - 193/79)  BP(mean): --  RR: 20 (01 Sep 2020 04:11) (18 - 20)  SpO2: 94% (01 Sep 2020 04:11) (94% - 97%)       Eyes:CRESCENCIO, EOMI  Ear/Nose/Throat: no oral lesion, no sinus tenderness on percussion	  Neck:no JVD, no lymphadenopathy, supple  Respiratory: CTA raghu  Cardiovascular: S1S2 RRR, no murmurs  Gastrointestinal:soft, (+) BS, no HSM  Extremities:no e/e/c        LABS:                        9.2    3.11  )-----------( 169      ( 01 Sep 2020 07:39 )             29.4     09-01    143  |  107  |  60<H>  ----------------------------<  124<H>  4.1   |  23  |  2.77<H>    Ca    8.1<L>      01 Sep 2020 07:39    TPro  6.0  /  Alb  3.5  /  TBili  0.5  /  DBili  x   /  AST  110<H>  /  ALT  276<H>  /  AlkPhos  155<H>  09-01            MICROBIOLOGY:    RECENT CULTURES:  08-28 @ 15:19 .Blood Blood-Venous                No growth to date.    08-27 @ 00:37 .Urine Clean Catch (Midstream)                No growth    08-26 @ 23:06 .Blood Blood-Peripheral                No Growth Final          RESPIRATORY CULTURES:              RADIOLOGY & ADDITIONAL STUDIES:        Pager [phone]  After 5 pm/weekends or if no response :[phone]

## 2020-09-01 NOTE — DIETITIAN INITIAL EVALUATION ADULT. - PROBLEM/PLAN-10
Patient scheduled 3/10/2020 via [facility]. [id number] desk staff flagged this appt due to symptoms: \"having a lot of anxiety, chest tightness, feeling down, no energy. \"    Call placed to patient for additional information on these symptoms.    LMOM to return ca DISPLAY PLAN FREE TEXT

## 2020-09-01 NOTE — PROGRESS NOTE ADULT - PROBLEM SELECTOR PLAN 3
JUAN RAMON on CKD  -Creatinine now downtrending   -L renal artery stenosis seen on duplex. Plan for intervention tomorrow.  -Renal f/u.

## 2020-09-01 NOTE — DIETITIAN INITIAL EVALUATION ADULT. - REASON INDICATOR FOR ASSESSMENT
Pt seen for length of stay.   Source: pt and EMR  Pertinent chart information: 80yom pmhx of HTN HLD ,PVD prostate CA bib ems for weakness and fever

## 2020-09-01 NOTE — PROGRESS NOTE ADULT - SUBJECTIVE AND OBJECTIVE BOX
Overnight events noted      VITAL:  T(C): , Max: 36.8 (08-31-20 @ 16:26)  T(F): , Max: 98.3 (08-31-20 @ 16:26)  HR: 69 (09-01-20 @ 04:11)  BP: 168/76 (09-01-20 @ 05:05)  RR: 20 (09-01-20 @ 04:11)  SpO2: 94% (09-01-20 @ 04:11)      PHYSICAL EXAM:  Constitutional: NAD, Alert  HEENT: NCAT, DMM  Neck: Supple, No JVD  Respiratory: CTA-b/l  Cardiovascular: RRR s1s2, no m/r/g  Gastrointestinal: BS+, soft, NT/ND  Extremities: No peripheral edema b/l  Neurological: no focal deficits; strength grossly intact  Back: no CVAT b/l  Skin: No rashes, no nevi      LABS:                        9.2    3.11  )-----------( 169      ( 01 Sep 2020 07:39 )             29.4     Na(143)/K(4.1)/Cl(107)/HCO3(23)/BUN(60)/Cr(2.77)Glu(124)/Ca(8.1)/Mg(--)/PO4(--)    09-01 @ 07:39  Na(141)/K(3.7)/Cl(102)/HCO3(25)/BUN(69)/Cr(3.13)Glu(122)/Ca(8.3)/Mg(--)/PO4(--)    08-31 @ 06:49      IMPRESSION: 80M w/ HTN, gout, PAD, and prostate CA s/p resection, 8/26/20 a/w PNA    (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 Atrophic right kidney; likely due to renal artery stenosis. Appears that he has significant renal artery stenosis on the left side as well.     (2)JUAN RAMON -  combination of prerenal azotemia and ischemic ATN. Resolving.    (3)Vasc - severe left renal artery stenosis. Given that he only has 1 functioning kidney, we must do all we can to preserve it. Appreciate input from Interventional Cardiology Dr. Gabriel Reddy - planned for renal artery angio tomorrow. Indicated that we continue the NS, 100cc/h for now, to optimize renal function for the procedure/minimize contrast nephropathy risk      RECOMMEND:  (1)NS 100cc/h as ordered - continue at least through 6 hours post-procedure tomorrow.  (2)BMP+Mg+PO4 daily  (3)No renal objection to proceeding with L renal artery angio tomorrow            Edis Cabral MD  [facility]  Office: [phone]  Cell: [phone] Overnight events noted      VITAL:  T(C): , Max: 36.8 (08-31-20 @ 16:26)  T(F): , Max: 98.3 (08-31-20 @ 16:26)  HR: 69 (09-01-20 @ 04:11)  BP: 168/76 (09-01-20 @ 05:05)  RR: 20 (09-01-20 @ 04:11)  SpO2: 94% (09-01-20 @ 04:11)      PHYSICAL EXAM:  Constitutional: NAD, Alert  HEENT: NCAT, DMM  Neck: Supple, No JVD  Respiratory: CTA-b/l  Cardiovascular: RRR s1s2, no m/r/g  Gastrointestinal: BS+, soft, NT/ND  Extremities: No peripheral edema b/l  Neurological: no focal deficits; strength grossly intact  Back: no CVAT b/l  Skin: No rashes, no nevi      LABS:                        9.2    3.11  )-----------( 169      ( 01 Sep 2020 07:39 )             29.4     Na(143)/K(4.1)/Cl(107)/HCO3(23)/BUN(60)/Cr(2.77)Glu(124)/Ca(8.1)/Mg(--)/PO4(--)    09-01 @ 07:39  Na(141)/K(3.7)/Cl(102)/HCO3(25)/BUN(69)/Cr(3.13)Glu(122)/Ca(8.3)/Mg(--)/PO4(--)    08-31 @ 06:49      IMPRESSION: 80M w/ HTN, gout, PAD, and prostate CA s/p resection, 8/26/20 a/w PNA    (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 Atrophic right kidney; likely due to renal artery stenosis. Appears that he has significant renal artery stenosis on the left side as well.     (2)JUAN RAMON -  combination of prerenal azotemia and ischemic ATN. Resolving.    (3)Vasc - severe left renal artery stenosis. Given that he only has 1 functioning kidney, we must do all we can to preserve it. Appreciate input from Interventional Cardiology Dr. Gabriel Reddy - planned for renal artery angio tomorrow. Indicated that we continue the NS, 100cc/h for now, to optimize renal function for the procedure/minimize contrast nephropathy risk    (4)CV - hypertensive - could benefit from addition of Norvasc today      RECOMMEND:  (1)NS 100cc/h as ordered - continue at least through 6 hours post-procedure tomorrow.  (2)Norvasc 5qd  (3)BMP+Mg+PO4 daily  (4)No renal objection to proceeding with L renal artery angio tomorrow            Edis Cabral MD  [facility]  Office: [phone]  Cell: [phone] No pain, no sob      VITAL:  T(C): , Max: 36.8 (08-31-20 @ 16:26)  T(F): , Max: 98.3 (08-31-20 @ 16:26)  HR: 69 (09-01-20 @ 04:11)  BP: 168/76 (09-01-20 @ 05:05)  RR: 20 (09-01-20 @ 04:11)  SpO2: 94% (09-01-20 @ 04:11)      PHYSICAL EXAM:  Constitutional: NAD, Alert  HEENT: NCAT, DMM  Neck: Supple, No JVD  Respiratory: CTA-b/l  Cardiovascular: RRR s1s2, no m/r/g  Gastrointestinal: BS+, soft, NT/ND  Extremities: No peripheral edema b/l  Neurological: no focal deficits; strength grossly intact  Back: no CVAT b/l  Skin: No rashes, no nevi      LABS:                        9.2    3.11  )-----------( 169      ( 01 Sep 2020 07:39 )             29.4     Na(143)/K(4.1)/Cl(107)/HCO3(23)/BUN(60)/Cr(2.77)Glu(124)/Ca(8.1)/Mg(--)/PO4(--)    09-01 @ 07:39  Na(141)/K(3.7)/Cl(102)/HCO3(25)/BUN(69)/Cr(3.13)Glu(122)/Ca(8.3)/Mg(--)/PO4(--)    08-31 @ 06:49      IMPRESSION: 80M w/ HTN, gout, PAD, and prostate CA s/p resection, 8/26/20 a/w PNA    (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 Atrophic right kidney; likely due to renal artery stenosis. Appears that he has significant renal artery stenosis on the left side as well.     (2)JUAN RAMON -  combination of prerenal azotemia and ischemic ATN. Resolving.    (3)Vasc - severe left renal artery stenosis. Given that he only has 1 functioning kidney, we must do all we can to preserve it. Appreciate input from Interventional Cardiology Dr. Gabriel Reddy - planned for renal artery angio tomorrow. Indicated that we continue the NS, 100cc/h for now, to optimize renal function for the procedure/minimize contrast nephropathy risk    (4)CV - hypertensive - could benefit from addition of Norvasc today      RECOMMEND:  (1)NS 100cc/h as ordered - continue at least through 6 hours post-procedure tomorrow.  (2)Norvasc 5qd  (3)BMP+Mg+PO4 daily  (4)No renal objection to proceeding with L renal artery angio tomorrow            Edis Cabral MD  [facility]  Office: [phone]  Cell: [phone]

## 2020-09-01 NOTE — PROGRESS NOTE ADULT - SUBJECTIVE AND OBJECTIVE BOX
Vascular Cardiology  Progress note  DIRECT SERVICE NUMBER: [phone]            EMAIL [email or web address]   OFFICE [phone]    CC: Weakness    INTERVAL HISTORY: NAEO. This AM, pt denies any complaints. No CP/dyspnea/palps. His renal fxn is improved today.            Allergies    No Known Allergies    Intolerances    	    MEDICATIONS:  carvedilol 6.25 milliGRAM(s) Oral every 12 hours  dipyridamole 200 mG/aspirin 25 mG 1 Capsule(s) Oral two times a day  heparin   Injectable 5000 Unit(s) SubCutaneous every 12 hours    cefepime   IVPB 1000 milliGRAM(s) IV Intermittent every 24 hours      acetaminophen   Tablet .. 650 milliGRAM(s) Oral every 6 hours PRN      colchicine 0.3 milliGRAM(s) Oral daily    calcitriol   Capsule 0.25 MICROGram(s) Oral daily  sodium chloride 0.9%. 1000 milliLiter(s) IV Continuous <Continuous>      PAST MEDICAL & SURGICAL HISTORY:  Gout  PC (prostate cancer): s/p surgical resection 3 years ago  Hypercholesterolemia  HTN - Hypertension  H/O carotid endarterectomy  H/O prostatectomy      FAMILY HISTORY:  No pertinent family history in first degree relatives      SOCIAL HISTORY:  unchanged    REVIEW OF SYSTEMS:  CONSTITUTIONAL: No fever, weight loss, or fatigue  EYES: No eye pain, visual disturbances, or discharge  ENMT:  No difficulty hearing, tinnitus, vertigo; No sinus or throat pain  NECK: No pain or stiffness  RESPIRATORY: No cough, wheezing, chills or hemoptysis; No Shortness of Breath  CARDIOVASCULAR: No chest pain, palpitations, passing out, dizziness, or leg swelling  GASTROINTESTINAL: No abdominal or epigastric pain. No nausea, vomiting, or hematemesis; No diarrhea or constipation. No melena or hematochezia.  GENITOURINARY: No dysuria, frequency, hematuria, or incontinence  NEUROLOGICAL: No headaches, memory loss, loss of strength, numbness, or tremors  SKIN: No itching, burning, rashes, or lesions   LYMPH Nodes: No enlarged glands  ENDOCRINE: No heat or cold intolerance; No hair loss  MUSCULOSKELETAL: No joint pain or swelling; No muscle, back, or extremity pain  PSYCHIATRIC: No depression, anxiety, mood swings, or difficulty sleeping  HEME/LYMPH: No easy bruising, or bleeding gums  ALLERY AND IMMUNOLOGIC: No hives or eczema	    [ x] All others negative	  [ ] Unable to obtain    PHYSICAL EXAM:  T(C): 36.5 (09-01-20 @ 04:11), Max: 36.8 (08-31-20 @ 16:26)  HR: 69 (09-01-20 @ 04:11) (62 - 73)  BP: 168/76 (09-01-20 @ 05:05) (137/76 - 193/79)  RR: 20 (09-01-20 @ 04:11) (18 - 20)  SpO2: 94% (09-01-20 @ 04:11) (94% - 97%)  Wt(kg): --  I&O's Summary    31 Aug 2020 07:01  -  01 Sep 2020 07:00  --------------------------------------------------------  IN: 2820 mL / OUT: 1900 mL / NET: 920 mL    01 Sep 2020 07:01  -  01 Sep 2020 09:21  --------------------------------------------------------  IN: 0 mL / OUT: 300 mL / NET: -300 mL        Appearance: Normal	  HEENT:   Normal oral mucosa, PERRL, EOMI	  Carotid:  Right: No bruit    Left:  +Bruit  Lymphatic: No lymphadenopathy  Cardiovascular: Normal S1 S2, No JVD, No murmurs, No edema  Respiratory: Lungs clear to auscultation	  Psychiatry: A & O x 3, Mood & affect appropriate  Gastrointestinal:  Soft, Non-tender, + BS	  Skin: No rashes, No ecchymoses, No cyanosis	  Neurologic: Non-focal  Extremities: Normal range of motion, No clubbing, cyanosis.  Vascular:   Right DP:  Palpable             Left DP:  Palpable      LABS:	 	    CBC Full  -  ( 01 Sep 2020 07:39 )  WBC Count : 3.11 K/uL  Hemoglobin : 9.2 g/dL  Hematocrit : 29.4 %  Platelet Count - Automated : 169 K/uL  Mean Cell Volume : 111.4 fl  Mean Cell Hemoglobin : 34.8 pg  Mean Cell Hemoglobin Concentration : 31.3 gm/dL  Auto Neutrophil # : x  Auto Lymphocyte # : x  Auto Monocyte # : x  Auto Eosinophil # : x  Auto Basophil # : x  Auto Neutrophil % : x  Auto Lymphocyte % : x  Auto Monocyte % : x  Auto Eosinophil % : x  Auto Basophil % : x    09-01    143  |  107  |  60<H>  ----------------------------<  124<H>  4.1   |  23  |  2.77<H>  08-31    141  |  102  |  69<H>  ----------------------------<  122<H>  3.7   |  25  |  3.13<H>    Ca    8.1<L>      01 Sep 2020 07:39  Ca    8.3<L>      31 Aug 2020 06:49    TPro  6.0  /  Alb  3.5  /  TBili  0.5  /  DBili  x   /  AST  110<H>  /  ALT  276<H>  /  AlkPhos  155<H>  09-01  TPro  6.2  /  Alb  3.4  /  TBili  0.4  /  DBili  x   /  AST  185<H>  /  ALT  358<H>  /  AlkPhos  179<H>  08-31          Assessment:  1. L renal A stenosis - Sig L renal A stenosis. Pt w/ solitary functional kidney and sig JUAN RAMON; likely unable to control BP w/ RAASi given JUAN RAMON. Intervention w/ stent may be reasonable.  2. PAD - Pt w/ severe PAD on imaging that is symptomatic.    1. Percutaneous intervention planned for tomorrow  2. Light breakfast only. NPO thereafter.  3. C/w IVF to optimize renal fxn. Monitor resp status while receiving IVF  4. C/w Aggrenox  5. Would start atorva 40 once liver tests normalize  6. C/w trend Cr; renally dose meds. Avoid Ntoxins.  7. No e/o CHF; if LE sxs persist, can consider cilostazol for symptomatic relief of PAD    Thank you      Vascular Cardiology Service  DIRECT SERVICE NUMBER [phone]  Office [phone]  email:   [email or web address]  Progress note  DIRECT SERVICE NUMBER: [phone]            EMAIL [email or web address]   OFFICE [phone]    CC: Weakness    INTERVAL HISTORY: NAEO. This AM, pt denies any complaints. No CP/dyspnea/palps. His renal fxn is improved today.            Allergies    No Known Allergies    Intolerances    	    MEDICATIONS:  carvedilol 6.25 milliGRAM(s) Oral every 12 hours  dipyridamole 200 mG/aspirin 25 mG 1 Capsule(s) Oral two times a day  heparin   Injectable 5000 Unit(s) SubCutaneous every 12 hours    cefepime   IVPB 1000 milliGRAM(s) IV Intermittent every 24 hours      acetaminophen   Tablet .. 650 milliGRAM(s) Oral every 6 hours PRN      colchicine 0.3 milliGRAM(s) Oral daily    calcitriol   Capsule 0.25 MICROGram(s) Oral daily  sodium chloride 0.9%. 1000 milliLiter(s) IV Continuous <Continuous>      PAST MEDICAL & SURGICAL HISTORY:  Gout  PC (prostate cancer): s/p surgical resection 3 years ago  Hypercholesterolemia  HTN - Hypertension  H/O carotid endarterectomy  H/O prostatectomy      FAMILY HISTORY:  No pertinent family history in first degree relatives      SOCIAL HISTORY:  unchanged    REVIEW OF SYSTEMS:  CONSTITUTIONAL: No fever, weight loss, or fatigue  EYES: No eye pain, visual disturbances, or discharge  ENMT:  No difficulty hearing, tinnitus, vertigo; No sinus or throat pain  NECK: No pain or stiffness  RESPIRATORY: No cough, wheezing, chills or hemoptysis; No Shortness of Breath  CARDIOVASCULAR: No chest pain, palpitations, passing out, dizziness, or leg swelling  GASTROINTESTINAL: No abdominal or epigastric pain. No nausea, vomiting, or hematemesis; No diarrhea or constipation. No melena or hematochezia.  GENITOURINARY: No dysuria, frequency, hematuria, or incontinence  NEUROLOGICAL: No headaches, memory loss, loss of strength, numbness, or tremors  SKIN: No itching, burning, rashes, or lesions   LYMPH Nodes: No enlarged glands  ENDOCRINE: No heat or cold intolerance; No hair loss  MUSCULOSKELETAL: No joint pain or swelling; No muscle, back, or extremity pain  PSYCHIATRIC: No depression, anxiety, mood swings, or difficulty sleeping  HEME/LYMPH: No easy bruising, or bleeding gums  ALLERY AND IMMUNOLOGIC: No hives or eczema	    [ x] All others negative	  [ ] Unable to obtain    PHYSICAL EXAM:  T(C): 36.5 (09-01-20 @ 04:11), Max: 36.8 (08-31-20 @ 16:26)  HR: 69 (09-01-20 @ 04:11) (62 - 73)  BP: 168/76 (09-01-20 @ 05:05) (137/76 - 193/79)  RR: 20 (09-01-20 @ 04:11) (18 - 20)  SpO2: 94% (09-01-20 @ 04:11) (94% - 97%)  Wt(kg): --  I&O's Summary    31 Aug 2020 07:01  -  01 Sep 2020 07:00  --------------------------------------------------------  IN: 2820 mL / OUT: 1900 mL / NET: 920 mL    01 Sep 2020 07:01  -  01 Sep 2020 09:21  --------------------------------------------------------  IN: 0 mL / OUT: 300 mL / NET: -300 mL        Appearance: Normal	  HEENT:   Normal oral mucosa, PERRL, EOMI	  Carotid:  Right: No bruit    Left:  +Bruit  Lymphatic: No lymphadenopathy  Cardiovascular: Normal S1 S2, No JVD, No murmurs, No edema  Respiratory: Lungs clear to auscultation	  Psychiatry: A & O x 3, Mood & affect appropriate  Gastrointestinal:  Soft, Non-tender, + BS	  Skin: No rashes, No ecchymoses, No cyanosis	  Neurologic: Non-focal  Extremities: Normal range of motion, No clubbing, cyanosis.  Vascular:   Right DP:  Palpable             Left DP:  Palpable      LABS:	 	    CBC Full  -  ( 01 Sep 2020 07:39 )  WBC Count : 3.11 K/uL  Hemoglobin : 9.2 g/dL  Hematocrit : 29.4 %  Platelet Count - Automated : 169 K/uL  Mean Cell Volume : 111.4 fl  Mean Cell Hemoglobin : 34.8 pg  Mean Cell Hemoglobin Concentration : 31.3 gm/dL  Auto Neutrophil # : x  Auto Lymphocyte # : x  Auto Monocyte # : x  Auto Eosinophil # : x  Auto Basophil # : x  Auto Neutrophil % : x  Auto Lymphocyte % : x  Auto Monocyte % : x  Auto Eosinophil % : x  Auto Basophil % : x    09-01    143  |  107  |  60<H>  ----------------------------<  124<H>  4.1   |  23  |  2.77<H>  08-31    141  |  102  |  69<H>  ----------------------------<  122<H>  3.7   |  25  |  3.13<H>    Ca    8.1<L>      01 Sep 2020 07:39  Ca    8.3<L>      31 Aug 2020 06:49    TPro  6.0  /  Alb  3.5  /  TBili  0.5  /  DBili  x   /  AST  110<H>  /  ALT  276<H>  /  AlkPhos  155<H>  09-01  TPro  6.2  /  Alb  3.4  /  TBili  0.4  /  DBili  x   /  AST  185<H>  /  ALT  358<H>  /  AlkPhos  179<H>  08-31          Assessment:  1. L renal A stenosis - Sig L renal A stenosis. Pt w/ solitary functional kidney and sig JUAN RAMON; likely unable to control BP w/ RAASi given JUAN RAMON. Intervention w/ stent may be reasonable.  2. PAD - Pt w/ severe PAD on imaging that is symptomatic.    1. Percutaneous intervention planned for tomorrow  2. Light breakfast only. NPO thereafter.  3. C/w IVF to optimize renal fxn. Monitor resp status while receiving IVF  4. C/w Aggrenox  5. Would start atorva 40 once liver tests normalize  6. C/w trend Cr; renally dose meds. Avoid Ntoxins.  7. No e/o CHF; if LE sxs persist, can consider cilostazol for symptomatic relief of PAD  8. Pt remains HTNsive, would add amlodipine 5mg daily    Thank you      Vascular Cardiology Service  DIRECT SERVICE NUMBER [phone]  Office [phone]  email:   [email or web address]  Progress note  DIRECT SERVICE NUMBER: [phone]            EMAIL [email or web address]   OFFICE [phone]    CC: Weakness    INTERVAL HISTORY: NAEO. This AM, pt denies any complaints. No CP/dyspnea/palps. His renal fxn is improved today.            Allergies    No Known Allergies    Intolerances    	    MEDICATIONS:  carvedilol 6.25 milliGRAM(s) Oral every 12 hours  dipyridamole 200 mG/aspirin 25 mG 1 Capsule(s) Oral two times a day  heparin   Injectable 5000 Unit(s) SubCutaneous every 12 hours    cefepime   IVPB 1000 milliGRAM(s) IV Intermittent every 24 hours      acetaminophen   Tablet .. 650 milliGRAM(s) Oral every 6 hours PRN      colchicine 0.3 milliGRAM(s) Oral daily    calcitriol   Capsule 0.25 MICROGram(s) Oral daily  sodium chloride 0.9%. 1000 milliLiter(s) IV Continuous <Continuous>      PAST MEDICAL & SURGICAL HISTORY:  Gout  PC (prostate cancer): s/p surgical resection 3 years ago  Hypercholesterolemia  HTN - Hypertension  H/O carotid endarterectomy  H/O prostatectomy      FAMILY HISTORY:  No pertinent family history in first degree relatives      SOCIAL HISTORY:  unchanged    REVIEW OF SYSTEMS:  CONSTITUTIONAL: No fever, weight loss, or fatigue  EYES: No eye pain, visual disturbances, or discharge  ENMT:  No difficulty hearing, tinnitus, vertigo; No sinus or throat pain  NECK: No pain or stiffness  RESPIRATORY: No cough, wheezing, chills or hemoptysis; No Shortness of Breath  CARDIOVASCULAR: No chest pain, palpitations, passing out, dizziness, or leg swelling  GASTROINTESTINAL: No abdominal or epigastric pain. No nausea, vomiting, or hematemesis; No diarrhea or constipation. No melena or hematochezia.  GENITOURINARY: No dysuria, frequency, hematuria, or incontinence  NEUROLOGICAL: No headaches, memory loss, loss of strength, numbness, or tremors  SKIN: No itching, burning, rashes, or lesions   LYMPH Nodes: No enlarged glands  ENDOCRINE: No heat or cold intolerance; No hair loss  MUSCULOSKELETAL: No joint pain or swelling; No muscle, back, or extremity pain  PSYCHIATRIC: No depression, anxiety, mood swings, or difficulty sleeping  HEME/LYMPH: No easy bruising, or bleeding gums  ALLERY AND IMMUNOLOGIC: No hives or eczema	    [ x] All others negative	  [ ] Unable to obtain    PHYSICAL EXAM:  T(C): 36.5 (09-01-20 @ 04:11), Max: 36.8 (08-31-20 @ 16:26)  HR: 69 (09-01-20 @ 04:11) (62 - 73)  BP: 168/76 (09-01-20 @ 05:05) (137/76 - 193/79)  RR: 20 (09-01-20 @ 04:11) (18 - 20)  SpO2: 94% (09-01-20 @ 04:11) (94% - 97%)  Wt(kg): --  I&O's Summary    31 Aug 2020 07:01  -  01 Sep 2020 07:00  --------------------------------------------------------  IN: 2820 mL / OUT: 1900 mL / NET: 920 mL    01 Sep 2020 07:01  -  01 Sep 2020 09:21  --------------------------------------------------------  IN: 0 mL / OUT: 300 mL / NET: -300 mL        Appearance: Normal	  HEENT:   Normal oral mucosa, PERRL, EOMI	  Carotid:  Right: No bruit    Left:  +Bruit  Lymphatic: No lymphadenopathy  Cardiovascular: Normal S1 S2, No JVD, No murmurs, No edema  Respiratory: Lungs clear to auscultation	  Psychiatry: A & O x 3, Mood & affect appropriate  Gastrointestinal:  Soft, Non-tender, + BS	  Skin: No rashes, No ecchymoses, No cyanosis	  Neurologic: Non-focal  Extremities: Normal range of motion, No clubbing, cyanosis.  Vascular:   Right DP:  Palpable             Left DP:  Palpable      LABS:	 	    CBC Full  -  ( 01 Sep 2020 07:39 )  WBC Count : 3.11 K/uL  Hemoglobin : 9.2 g/dL  Hematocrit : 29.4 %  Platelet Count - Automated : 169 K/uL  Mean Cell Volume : 111.4 fl  Mean Cell Hemoglobin : 34.8 pg  Mean Cell Hemoglobin Concentration : 31.3 gm/dL  Auto Neutrophil # : x  Auto Lymphocyte # : x  Auto Monocyte # : x  Auto Eosinophil # : x  Auto Basophil # : x  Auto Neutrophil % : x  Auto Lymphocyte % : x  Auto Monocyte % : x  Auto Eosinophil % : x  Auto Basophil % : x    09-01    143  |  107  |  60<H>  ----------------------------<  124<H>  4.1   |  23  |  2.77<H>  08-31    141  |  102  |  69<H>  ----------------------------<  122<H>  3.7   |  25  |  3.13<H>    Ca    8.1<L>      01 Sep 2020 07:39  Ca    8.3<L>      31 Aug 2020 06:49    TPro  6.0  /  Alb  3.5  /  TBili  0.5  /  DBili  x   /  AST  110<H>  /  ALT  276<H>  /  AlkPhos  155<H>  09-01  TPro  6.2  /  Alb  3.4  /  TBili  0.4  /  DBili  x   /  AST  185<H>  /  ALT  358<H>  /  AlkPhos  179<H>  08-31          Assessment:  1. L renal A stenosis - Sig L renal A stenosis. Pt w/ solitary functional kidney and sig JUAN RAMON; likely unable to control BP w/ RAASi given JUAN RAMON. Intervention w/ stent may be reasonable.  2. PAD - Pt w/ severe PAD on imaging that is symptomatic.    1. Percutaneous intervention to L renal A planned for tomorrow  2. Light breakfast only tomorrow AM  3. Given plan for renal A stent, would d/c Aggrenox now. Start ASA 81mg PO daily. Also load w/ clopidogrel 600mg x 1 now then start 75mg daily tomorrow.  4. C/w IVF to optimize renal fxn. Monitor resp status while receiving IVF  5. Would start atorva 40 once liver tests normalize  6. C/w trend Cr; renally dose meds. Avoid Ntoxins.  7. No e/o CHF; if LE sxs persist, can consider cilostazol for symptomatic relief of PAD  8. Pt remains HTNsive, would add amlodipine 5mg daily    Thank you      Vascular Cardiology Service  DIRECT SERVICE NUMBER [phone]  Office [phone]  email:   [email or web address]  Progress note  DIRECT SERVICE NUMBER: [phone]            EMAIL [email or web address]   OFFICE [phone]    CC: Weakness    INTERVAL HISTORY: NAEO. This AM, pt denies any complaints. No CP/dyspnea/palps. His renal fxn is improved today. He remains hypertensive.            Allergies    No Known Allergies    Intolerances    	    MEDICATIONS:  carvedilol 6.25 milliGRAM(s) Oral every 12 hours  dipyridamole 200 mG/aspirin 25 mG 1 Capsule(s) Oral two times a day  heparin   Injectable 5000 Unit(s) SubCutaneous every 12 hours    cefepime   IVPB 1000 milliGRAM(s) IV Intermittent every 24 hours      acetaminophen   Tablet .. 650 milliGRAM(s) Oral every 6 hours PRN      colchicine 0.3 milliGRAM(s) Oral daily    calcitriol   Capsule 0.25 MICROGram(s) Oral daily  sodium chloride 0.9%. 1000 milliLiter(s) IV Continuous <Continuous>      PAST MEDICAL & SURGICAL HISTORY:  Gout  PC (prostate cancer): s/p surgical resection 3 years ago  Hypercholesterolemia  HTN - Hypertension  H/O carotid endarterectomy  H/O prostatectomy      FAMILY HISTORY:  No pertinent family history in first degree relatives      SOCIAL HISTORY:  unchanged    REVIEW OF SYSTEMS:  CONSTITUTIONAL: No fever, weight loss, or fatigue  EYES: No eye pain, visual disturbances, or discharge  ENMT:  No difficulty hearing, tinnitus, vertigo; No sinus or throat pain  NECK: No pain or stiffness  RESPIRATORY: No cough, wheezing, chills or hemoptysis; No Shortness of Breath  CARDIOVASCULAR: No chest pain, palpitations, passing out, dizziness, or leg swelling  GASTROINTESTINAL: No abdominal or epigastric pain. No nausea, vomiting, or hematemesis; No diarrhea or constipation. No melena or hematochezia.  GENITOURINARY: No dysuria, frequency, hematuria, or incontinence  NEUROLOGICAL: No headaches, memory loss, loss of strength, numbness, or tremors  SKIN: No itching, burning, rashes, or lesions   LYMPH Nodes: No enlarged glands  ENDOCRINE: No heat or cold intolerance; No hair loss  MUSCULOSKELETAL: No joint pain or swelling; No muscle, back, or extremity pain  PSYCHIATRIC: No depression, anxiety, mood swings, or difficulty sleeping  HEME/LYMPH: No easy bruising, or bleeding gums  ALLERY AND IMMUNOLOGIC: No hives or eczema	    [ x] All others negative	  [ ] Unable to obtain    PHYSICAL EXAM:  T(C): 36.5 (09-01-20 @ 04:11), Max: 36.8 (08-31-20 @ 16:26)  HR: 69 (09-01-20 @ 04:11) (62 - 73)  BP: 168/76 (09-01-20 @ 05:05) (137/76 - 193/79)  RR: 20 (09-01-20 @ 04:11) (18 - 20)  SpO2: 94% (09-01-20 @ 04:11) (94% - 97%)  Wt(kg): --  I&O's Summary    31 Aug 2020 07:01  -  01 Sep 2020 07:00  --------------------------------------------------------  IN: 2820 mL / OUT: 1900 mL / NET: 920 mL    01 Sep 2020 07:01  -  01 Sep 2020 09:21  --------------------------------------------------------  IN: 0 mL / OUT: 300 mL / NET: -300 mL        Appearance: Normal	  HEENT:   Normal oral mucosa, PERRL, EOMI	  Carotid:  Right: No bruit    Left:  +Bruit  Lymphatic: No lymphadenopathy  Cardiovascular: Normal S1 S2, No JVD, No murmurs, No edema  Respiratory: Lungs clear to auscultation	  Psychiatry: A & O x 3, Mood & affect appropriate  Gastrointestinal:  Soft, Non-tender, + BS	  Skin: No rashes, No ecchymoses, No cyanosis	  Neurologic: Non-focal  Extremities: Normal range of motion, No clubbing, cyanosis.         LABS:	 	    CBC Full  -  ( 01 Sep 2020 07:39 )  WBC Count : 3.11 K/uL  Hemoglobin : 9.2 g/dL  Hematocrit : 29.4 %  Platelet Count - Automated : 169 K/uL  Mean Cell Volume : 111.4 fl  Mean Cell Hemoglobin : 34.8 pg  Mean Cell Hemoglobin Concentration : 31.3 gm/dL  Auto Neutrophil # : x  Auto Lymphocyte # : x  Auto Monocyte # : x  Auto Eosinophil # : x  Auto Basophil # : x  Auto Neutrophil % : x  Auto Lymphocyte % : x  Auto Monocyte % : x  Auto Eosinophil % : x  Auto Basophil % : x    09-01    143  |  107  |  60<H>  ----------------------------<  124<H>  4.1   |  23  |  2.77<H>  08-31    141  |  102  |  69<H>  ----------------------------<  122<H>  3.7   |  25  |  3.13<H>    Ca    8.1<L>      01 Sep 2020 07:39  Ca    8.3<L>      31 Aug 2020 06:49    TPro  6.0  /  Alb  3.5  /  TBili  0.5  /  DBili  x   /  AST  110<H>  /  ALT  276<H>  /  AlkPhos  155<H>  09-01  TPro  6.2  /  Alb  3.4  /  TBili  0.4  /  DBili  x   /  AST  185<H>  /  ALT  358<H>  /  AlkPhos  179<H>  08-31          Assessment:  1. L renal A stenosis - Sig L renal A stenosis. Pt w/ solitary functional kidney and sig JUAN ARMON; likely unable to control BP w/ RAASi given JUAN RAMON. Intervention w/ stent may be reasonable.  2. PAD - Pt w/ severe PAD on imaging that is symptomatic.    1. Percutaneous intervention to L renal A planned for tomorrow  2. Light breakfast only tomorrow AM  3. Given plan for renal A stent, would d/c Aggrenox now. Start ASA 81mg PO daily. Also load w/ clopidogrel 600mg x 1 now then start 75mg daily tomorrow.  4. C/w IVF to optimize renal fxn. Monitor resp status while receiving IVF  5. Would start atorva 40 once liver tests normalize  6. C/w trend Cr; renally dose meds. Avoid Ntoxins.  7.. Pt remains HTNsive, would add amlodipine 5mg daily    Thank you      Vascular Cardiology Service  DIRECT SERVICE NUMBER [phone]  Office [phone]  email:   [email or web address]

## 2020-09-01 NOTE — PROGRESS NOTE ADULT - ASSESSMENT
imp/rx:  systemic process leading to injury to kidneys, liver and marrow.  no skin changes.  possible pna.  sepsis could explain a multisystem change, but unclear if any infection.  Lungs noted and on cefepime empirically.  blood cx currently neg.     [id number] better      ultrasound noted   day 6/7 of cefepime  for possible vascular intervention this week   no signs of cholecystitis

## 2020-09-01 NOTE — CHART NOTE - NSCHARTNOTEFT_GEN_A_CORE
Patient with 's sbp , pt seen and examined , denies all complaints   - D/w /Misha + Clonidine 0.1mg po Q6hrs prn for sbp greater than 160   -  made aware

## 2020-09-02 LAB
ALBUMIN SERPL ELPH-MCNC: 3.2 G/DL — LOW (ref 3.3–5)
ALP SERPL-CCNC: 126 U/L — HIGH (ref 40–120)
ALT FLD-CCNC: 189 U/L — HIGH (ref 10–45)
ANION GAP SERPL CALC-SCNC: 11 MMOL/L — SIGNIFICANT CHANGE UP (ref 5–17)
AST SERPL-CCNC: 54 U/L — HIGH (ref 10–40)
BILIRUB SERPL-MCNC: 0.4 MG/DL — SIGNIFICANT CHANGE UP (ref 0.2–1.2)
BUN SERPL-MCNC: 52 MG/DL — HIGH (ref 7–23)
CALCIUM SERPL-MCNC: 8 MG/DL — LOW (ref 8.4–10.5)
CHLORIDE SERPL-SCNC: 110 MMOL/L — HIGH (ref 96–108)
CO2 SERPL-SCNC: 22 MMOL/L — SIGNIFICANT CHANGE UP (ref 22–31)
CREAT SERPL-MCNC: 2.29 MG/DL — HIGH (ref 0.5–1.3)
CULTURE RESULTS: SIGNIFICANT CHANGE UP
CULTURE RESULTS: SIGNIFICANT CHANGE UP
GLUCOSE SERPL-MCNC: 135 MG/DL — HIGH (ref 70–99)
HCT VFR BLD CALC: 27.8 % — LOW (ref 39–50)
HGB BLD-MCNC: 8.7 G/DL — LOW (ref 13–17)
MCHC RBC-ENTMCNC: 31.3 GM/DL — LOW (ref 32–36)
MCHC RBC-ENTMCNC: 35.4 PG — HIGH (ref 27–34)
MCV RBC AUTO: 113 FL — HIGH (ref 80–100)
NRBC # BLD: 0 /100 WBCS — SIGNIFICANT CHANGE UP (ref 0–0)
PLATELET # BLD AUTO: 194 K/UL — SIGNIFICANT CHANGE UP (ref 150–400)
POTASSIUM SERPL-MCNC: 4.4 MMOL/L — SIGNIFICANT CHANGE UP (ref 3.5–5.3)
POTASSIUM SERPL-SCNC: 4.4 MMOL/L — SIGNIFICANT CHANGE UP (ref 3.5–5.3)
PROT SERPL-MCNC: 5.6 G/DL — LOW (ref 6–8.3)
RBC # BLD: 2.46 M/UL — LOW (ref 4.2–5.8)
RBC # FLD: 17.3 % — HIGH (ref 10.3–14.5)
SARS-COV-2 RNA SPEC QL NAA+PROBE: SIGNIFICANT CHANGE UP
SODIUM SERPL-SCNC: 143 MMOL/L — SIGNIFICANT CHANGE UP (ref 135–145)
SPECIMEN SOURCE: SIGNIFICANT CHANGE UP
SPECIMEN SOURCE: SIGNIFICANT CHANGE UP
WBC # BLD: 3.82 K/UL — SIGNIFICANT CHANGE UP (ref 3.8–10.5)
WBC # FLD AUTO: 3.82 K/UL — SIGNIFICANT CHANGE UP (ref 3.8–10.5)
WNV AB SPEC QL: SIGNIFICANT CHANGE UP
WNV IGG TITR FLD: NEGATIVE — SIGNIFICANT CHANGE UP
WNV IGM SPEC QL: NEGATIVE — SIGNIFICANT CHANGE UP

## 2020-09-02 PROCEDURE — 36251 INS CATH REN ART 1ST UNILAT: CPT

## 2020-09-02 PROCEDURE — 99232 SBSQ HOSP IP/OBS MODERATE 35: CPT

## 2020-09-02 PROCEDURE — 37236 OPEN/PERQ PLACE STENT 1ST: CPT

## 2020-09-02 PROCEDURE — 99233 SBSQ HOSP IP/OBS HIGH 50: CPT

## 2020-09-02 RX ORDER — CILOSTAZOL 100 MG/1
50 TABLET ORAL EVERY 12 HOURS
Refills: 0 | Status: DISCONTINUED | OUTPATIENT
Start: 2020-09-02 | End: 2020-09-04

## 2020-09-02 RX ORDER — LABETALOL HCL 100 MG
10 TABLET ORAL ONCE
Refills: 0 | Status: COMPLETED | OUTPATIENT
Start: 2020-09-02 | End: 2020-09-02

## 2020-09-02 RX ADMIN — CARVEDILOL PHOSPHATE 6.25 MILLIGRAM(S): 80 CAPSULE, EXTENDED RELEASE ORAL at 06:24

## 2020-09-02 RX ADMIN — CLOPIDOGREL BISULFATE 75 MILLIGRAM(S): 75 TABLET, FILM COATED ORAL at 10:54

## 2020-09-02 RX ADMIN — CARVEDILOL PHOSPHATE 6.25 MILLIGRAM(S): 80 CAPSULE, EXTENDED RELEASE ORAL at 17:50

## 2020-09-02 RX ADMIN — Medication 0.1 MILLIGRAM(S): at 01:06

## 2020-09-02 RX ADMIN — CILOSTAZOL 50 MILLIGRAM(S): 100 TABLET ORAL at 20:10

## 2020-09-02 RX ADMIN — HEPARIN SODIUM 5000 UNIT(S): 5000 INJECTION INTRAVENOUS; SUBCUTANEOUS at 06:23

## 2020-09-02 RX ADMIN — AMLODIPINE BESYLATE 5 MILLIGRAM(S): 2.5 TABLET ORAL at 06:23

## 2020-09-02 RX ADMIN — HEPARIN SODIUM 5000 UNIT(S): 5000 INJECTION INTRAVENOUS; SUBCUTANEOUS at 17:49

## 2020-09-02 RX ADMIN — Medication 81 MILLIGRAM(S): at 10:34

## 2020-09-02 RX ADMIN — CALCITRIOL 0.25 MICROGRAM(S): 0.5 CAPSULE ORAL at 17:49

## 2020-09-02 RX ADMIN — SODIUM CHLORIDE 100 MILLILITER(S): 9 INJECTION INTRAMUSCULAR; INTRAVENOUS; SUBCUTANEOUS at 04:54

## 2020-09-02 RX ADMIN — Medication 10 MILLIGRAM(S): at 16:10

## 2020-09-02 RX ADMIN — CEFEPIME 100 MILLIGRAM(S): 1 INJECTION, POWDER, FOR SOLUTION INTRAMUSCULAR; INTRAVENOUS at 10:52

## 2020-09-02 RX ADMIN — Medication 0.3 MILLIGRAM(S): at 17:50

## 2020-09-02 NOTE — PROGRESS NOTE ADULT - SUBJECTIVE AND OBJECTIVE BOX
Overnight events noted      VITAL:  T(C): , Max: 36.9 (09-01-20 @ 11:32)  T(F): , Max: 98.4 (09-01-20 @ 11:32)  HR: 72 (09-02-20 @ 04:48)  BP: 157/78 (09-02-20 @ 04:48)  RR: 16 (09-02-20 @ 04:48)  SpO2: 95% (09-02-20 @ 04:48)      PHYSICAL EXAM:  Constitutional: NAD, Alert  HEENT: NCAT, DMM  Neck: Supple, No JVD  Respiratory: CTA-b/l  Cardiovascular: RRR s1s2, no m/r/g  Gastrointestinal: BS+, soft, NT/ND  Extremities: No peripheral edema b/l  Neurological: no focal deficits; strength grossly intact  Back: no CVAT b/l  Skin: No rashes, no nevi    LABS:                        8.7    3.82  )-----------( 194      ( 02 Sep 2020 08:01 )             27.8     Na(143)/K(4.4)/Cl(110)/HCO3(22)/BUN(52)/Cr(2.29)Glu(135)/Ca(8.0)/Mg(--)/PO4(--)    09-02 @ 08:01  Na(143)/K(4.1)/Cl(107)/HCO3(23)/BUN(60)/Cr(2.77)Glu(124)/Ca(8.1)/Mg(--)/PO4(--)    09-01 @ 07:39  Na(141)/K(3.7)/Cl(102)/HCO3(25)/BUN(69)/Cr(3.13)Glu(122)/Ca(8.3)/Mg(--)/PO4(--)    08-31 @ 06:49      IMPRESSION: 80M w/ HTN, gout, PAD, and prostate CA s/p resection, 8/26/20 a/w PNA    (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 Atrophic right kidney; likely due to renal artery stenosis. Appears that he has significant renal artery stenosis on the left side as well.     (2)JUAN RAMON -  combination of prerenal azotemia and ischemic ATN. Resolved    (3)Vasc - severe left renal artery stenosis. Planned for L renal arterogram/potential intervention today with Dr. Gabriel Reddy    (4)CV - BP mildly high, but acceptable for now      RECOMMEND:  (1)NS 100cc/h as ordered - continue at least through 6 hours post-procedure today  (2)Antihypertensives as ordered  (3)BMP+Mg+PO4 daily  (4)No renal objection to proceeding with L renal artery angio today              Edis Cabral MD  [facility]  Office: [phone]  Cell: [phone] No pain, no sob      VITAL:  T(C): , Max: 36.9 (09-01-20 @ 11:32)  T(F): , Max: 98.4 (09-01-20 @ 11:32)  HR: 72 (09-02-20 @ 04:48)  BP: 157/78 (09-02-20 @ 04:48)  RR: 16 (09-02-20 @ 04:48)  SpO2: 95% (09-02-20 @ 04:48)      PHYSICAL EXAM:  Constitutional: NAD, Alert  HEENT: NCAT, DMM  Neck: Supple, No JVD  Respiratory: CTA-b/l  Cardiovascular: RRR s1s2, no m/r/g  Gastrointestinal: BS+, soft, NT/ND  Extremities: No peripheral edema b/l  Neurological: no focal deficits; strength grossly intact  Back: no CVAT b/l  Skin: No rashes, no nevi    LABS:                        8.7    3.82  )-----------( 194      ( 02 Sep 2020 08:01 )             27.8     Na(143)/K(4.4)/Cl(110)/HCO3(22)/BUN(52)/Cr(2.29)Glu(135)/Ca(8.0)/Mg(--)/PO4(--)    09-02 @ 08:01  Na(143)/K(4.1)/Cl(107)/HCO3(23)/BUN(60)/Cr(2.77)Glu(124)/Ca(8.1)/Mg(--)/PO4(--)    09-01 @ 07:39  Na(141)/K(3.7)/Cl(102)/HCO3(25)/BUN(69)/Cr(3.13)Glu(122)/Ca(8.3)/Mg(--)/PO4(--)    08-31 @ 06:49      IMPRESSION: 80M w/ HTN, gout, PAD, and prostate CA s/p resection, 8/26/20 a/w PNA    (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 Atrophic right kidney; likely due to renal artery stenosis. Appears that he has significant renal artery stenosis on the left side as well.     (2)JUAN RAMON -  combination of prerenal azotemia and ischemic ATN. Resolved    (3)Vasc - severe left renal artery stenosis. Planned for L renal arterogram/potential intervention today with Dr. Gabriel Reddy    (4)CV - BP mildly high, but acceptable for now      RECOMMEND:  (1)NS 100cc/h as ordered - continue at least through 6 hours post-procedure today  (2)Antihypertensives as ordered  (3)BMP+Mg+PO4 daily  (4)No renal objection to proceeding with L renal artery angio today              Edis Cabral MD  [facility]  Office: [phone]  Cell: [phone]

## 2020-09-02 NOTE — PROGRESS NOTE ADULT - ASSESSMENT
79yo M pmhx of HTN, HLD, TIA/possible stroke in 2012 s/p carotid endarterectomy, past inferior MI ,prostate CA s/p prostatectomy, OA of hips and legs, CKD3, gout, presented with LLE pain and difficulty ambulating.     Plan:  - recommend pletal 50 bid for le art insuff  will follow

## 2020-09-02 NOTE — PROGRESS NOTE ADULT - ASSESSMENT
80yom pmhx of HTN HLD ,PVD prostate CA bib ems for weakness and fever for past 2 days. unable to get off the couch since yesterday. No chest pain no sob, no nausea or vomiting. +incontinence of urine; seen by PMD this week for arthritis and started on Tramadol.     Problem/Plan - 1:  ·  Problem: Sepsis.  Plan: Hemodynamically stable. S/P cultures . IV Abxs. ID help appreciated.     Clinically better.      Problem/Plan - 2:  ·  Problem: Pneumonia.  Plan: IV Abxs. Pulmonary following.      Problem/Plan - 3:  ·  Problem: Left leg pain with PVD .  Plan:  Vascular helping.      Problem/Plan - 4:  ·  Problem: CKD (chronic kidney disease) stage 3, GFR 30-59 ml/min with Metabolic Acidosis with JUAN RAMON  .  Plan: Renal following.   Creatinine  trending down.   s/p angiogram today     Problem/Plan - 5:  ·  Problem: Hypertension.  Plan: BP meds with hold parameters. Doppler noted.  RA Stenosis so vascular consulted.   s/p angiogram today     Problem/Plan - 6:  Problem: Hypercholesterolemia. Plan: Statin.     Problem/Plan - 7:  ·  Problem: Gout.  Plan: Colchicine.      Problem/Plan - 8:  ·  Problem: Anemia, chronic disease with Thrombocytopenia .  Plan: Work up noted.  Hematology following.      Problem/Plan - 9:  ·  Problem:  PC (prostate cancer).  Plan: Outpt follow up.      Problem/Plan - 10:  Problem: Cholelithiasis. Plan; LFT trending down . Holding statin and  US RUQ noted.    Surgery consult noted.       Problem/Plan - 11:  Problem: Renal Artery Stenosis . Plan; s/p angiogram today .

## 2020-09-02 NOTE — CHART NOTE - NSCHARTNOTEFT_GEN_A_CORE
Removal of Femoral Sheath    Pulses in the right lower extremity are palpable. The patient was placed in the supine position. The insertion site was identified and the sutures were removed per protocol.  The 6 Divehi femoral sheath was then removed by Raul GOLDBERG. Direct pressure was applied for  ___20___ minutes.     Monitoring of the right groin and both lower extremities including neuro-vascular checks and vital signs every 15 minutes x 4, then every 30 minutes x 2, then every 1 hour was ordered.    Complications: None    Comments: Activity restrictions and reportable symptoms discussed with patient.     Nisha Persaud ANP-C  [id number]

## 2020-09-02 NOTE — PROGRESS NOTE ADULT - SUBJECTIVE AND OBJECTIVE BOX
INTERVAL HPI/OVERNIGHT EVENTS:  s/p angiogram       Vital Signs Last 24 Hrs  T(C): 36.6 (02 Sep 2020 20:28), Max: 36.8 (02 Sep 2020 09:18)  T(F): 97.9 (02 Sep 2020 20:28), Max: 98.2 (02 Sep 2020 09:18)  HR: 76 (02 Sep 2020 20:28) (61 - 93)  BP: 147/75 (02 Sep 2020 20:28) (147/75 - 201/89)  BP(mean): --  RR: 18 (02 Sep 2020 20:28) (16 - 18)  SpO2: 94% (02 Sep 2020 20:28) (94% - 99%)      MEDICATIONS  (STANDING):  amLODIPine   Tablet 5 milliGRAM(s) Oral daily  aspirin  chewable 81 milliGRAM(s) Oral daily  calcitriol   Capsule 0.25 MICROGram(s) Oral daily  carvedilol 6.25 milliGRAM(s) Oral every 12 hours  cefepime   IVPB 1000 milliGRAM(s) IV Intermittent every 24 hours  cilostazol 50 milliGRAM(s) Oral every 12 hours  clopidogrel Tablet 75 milliGRAM(s) Oral daily  colchicine 0.3 milliGRAM(s) Oral daily  heparin   Injectable 5000 Unit(s) SubCutaneous every 12 hours  sodium chloride 0.9%. 1000 milliLiter(s) (100 mL/Hr) IV Continuous <Continuous>    MEDICATIONS  (PRN):  acetaminophen   Tablet .. 650 milliGRAM(s) Oral every 6 hours PRN Temp greater or equal to 38C (100.4F), Moderate Pain (4 - 6)  cloNIDine 0.1 milliGRAM(s) Oral every 6 hours PRN for sbp greater than 160      LABS:                                              8.7    3.82  )-----------( 194      ( 02 Sep 2020 08:01 )             27.8   09-02    143  |  110<H>  |  52<H>  ----------------------------<  135<H>  4.4   |  22  |  2.29<H>    Ca    8.0<L>      02 Sep 2020 08:01    TPro  5.6<L>  /  Alb  3.2<L>  /  TBili  0.4  /  DBili  x   /  AST  54<H>  /  ALT  189<H>  /  AlkPhos  126<H>  09-02      CAPILLARY BLOOD GLUCOSE              REVIEW OF SYSTEMS:  CONSTITUTIONAL: No fever, weight loss, or fatigue  EYES: No eye pain, visual disturbances, or discharge  ENMT:  No difficulty hearing, tinnitus, vertigo; No sinus or throat pain  NECK: No pain or stiffness  RESPIRATORY: No cough, wheezing, chills or hemoptysis; No shortness of breath  CARDIOVASCULAR: No chest pain, palpitations, dizziness, or leg swelling  GASTROINTESTINAL: No abdominal or epigastric pain. No nausea, vomiting, or hematemesis; No diarrhea or constipation. No melena or hematochezia.  GENITOURINARY: No dysuria, frequency, hematuria, or incontinence  NEUROLOGICAL: No headaches, memory loss, loss of strength, numbness, or tremors      RADIOLOGY & ADDITIONAL TESTS:    Consultant(s) Notes Reviewed:  [x ] YES  [ ] NO    PHYSICAL EXAM:  GENERAL: NAD, well-groomed, well-developed,not in any distress ,  HEAD:  Atraumatic, Normocephalic  EYES: EOMI, PERRLA, conjunctiva and sclera clear  ENMT: No tonsillar erythema, exudates, or enlargement; Moist mucous membranes, Good dentition, No lesions  NECK: Supple, No JVD, Normal thyroid  NERVOUS SYSTEM:  Alert & Oriented X3, No focal deficit   CHEST/LUNG: Good air entry bilateral with no  rales, rhonchi, wheezing, or rubs  HEART: Regular rate and rhythm; No murmurs, rubs, or gallops  ABDOMEN: Soft, Nontender, Nondistended; Bowel sounds present  EXTREMITIES:  2+ Peripheral Pulses, No clubbing, cyanosis, or edema  SKIN: No rashes or lesions    Care Discussed with Consultants/Other Providers [ x] YES  [ ] NO

## 2020-09-02 NOTE — PROGRESS NOTE ADULT - SUBJECTIVE AND OBJECTIVE BOX
Heme/onc brief note    Pt not seen, off the floor for study. Labs reviewed, hgb slightly downtrending but iron, B12, folate adequate, SPEP neg.     monitor CBC  transfuse prn  will follow

## 2020-09-02 NOTE — PROGRESS NOTE ADULT - SUBJECTIVE AND OBJECTIVE BOX
infectious diseases progress note:    Patient is a 80y old  Male who presents with a chief complaint of weakness and fever (01 Sep 2020 20:19)        Sepsis        R   Allergies    No Known Allergies    Intolerances        ANTIBIOTICS/RELEVANT:  antimicrobials  cefepime   IVPB 1000 milliGRAM(s) IV Intermittent every 24 hours    immunologic:    OTHER:  acetaminophen   Tablet .. 650 milliGRAM(s) Oral every 6 hours PRN  amLODIPine   Tablet 5 milliGRAM(s) Oral daily  aspirin  chewable 81 milliGRAM(s) Oral daily  calcitriol   Capsule 0.25 MICROGram(s) Oral daily  carvedilol 6.25 milliGRAM(s) Oral every 12 hours  cloNIDine 0.1 milliGRAM(s) Oral every 6 hours PRN  clopidogrel Tablet 75 milliGRAM(s) Oral daily  colchicine 0.3 milliGRAM(s) Oral daily  heparin   Injectable 5000 Unit(s) SubCutaneous every 12 hours  sodium chloride 0.9%. 1000 milliLiter(s) IV Continuous <Continuous>      Objective:  Vital Signs Last 24 Hrs  T(C): 36.4 (02 Sep 2020 04:48), Max: 36.9 (01 Sep 2020 11:32)  T(F): 97.6 (02 Sep 2020 04:48), Max: 98.4 (01 Sep 2020 11:32)  HR: 72 (02 Sep 2020 04:48) (68 - 100)  BP: 157/78 (02 Sep 2020 04:48) (133/76 - 185/66)  BP(mean): --  RR: 16 (02 Sep 2020 04:48) (16 - 20)  SpO2: 95% (02 Sep 2020 04:48) (95% - 97%)       Eyes:CRESCENCIO, EOMI  Ear/Nose/Throat: no oral lesion, no sinus tenderness on percussion	  Neck:no JVD, no lymphadenopathy, supple  Respiratory: CTA raghu  Cardiovascular: S1S2 RRR, no murmurs  Gastrointestinal:soft, (+) BS, no HSM  Extremities:no e/e/c        LABS:                        8.7    3.82  )-----------( 194      ( 02 Sep 2020 08:01 )             27.8     09-01    143  |  107  |  60<H>  ----------------------------<  124<H>  4.1   |  23  |  2.77<H>    Ca    8.1<L>      01 Sep 2020 07:39    TPro  6.0  /  Alb  3.5  /  TBili  0.5  /  DBili  x   /  AST  110<H>  /  ALT  276<H>  /  AlkPhos  155<H>  09-01            MICROBIOLOGY:    RECENT CULTURES:  08-28 @ 15:19 .Blood Blood-Venous                No growth to date.    08-27 @ 00:37 .Urine Clean Catch (Midstream)                No growth    08-26 @ 23:06 .Blood Blood-Peripheral                No Growth Final          RESPIRATORY CULTURES:              RADIOLOGY & ADDITIONAL STUDIES:        Pager [phone]  After 5 pm/weekends or if no response :[phone]

## 2020-09-02 NOTE — PROGRESS NOTE ADULT - ASSESSMENT
imp/rx:  systemic process leading to injury to kidneys, liver and marrow.  no skin changes.  possible pna.  sepsis could explain a multisystem change, but unclear if any infection.  Lungs noted and on cefepime empirically.  blood cx currently neg.     [id number] better      ultrasound noted   day 7/7 of cefepime     no signs of cholecystitis   for vascular procedure

## 2020-09-02 NOTE — PROGRESS NOTE ADULT - SUBJECTIVE AND OBJECTIVE BOX
Vascular Cardiology  Progress note  DIRECT SERVICE NUMBER: [phone]            EMAIL [email or web address]   OFFICE [phone]    CC: Weakness    Interval Events:  Denies C/P or SOB.  No LE pain at rest.  No LE edema.  -180s. Received Clonidine PRN.     Allergies  No Known Allergies    MEDICATIONS  (STANDING):  amLODIPine   Tablet 5 milliGRAM(s) Oral daily  aspirin  chewable 81 milliGRAM(s) Oral daily  calcitriol   Capsule 0.25 MICROGram(s) Oral daily  carvedilol 6.25 milliGRAM(s) Oral every 12 hours  cefepime   IVPB 1000 milliGRAM(s) IV Intermittent every 24 hours  clopidogrel Tablet 75 milliGRAM(s) Oral daily  colchicine 0.3 milliGRAM(s) Oral daily  heparin   Injectable 5000 Unit(s) SubCutaneous every 12 hours  sodium chloride 0.9%. 1000 milliLiter(s) (100 mL/Hr) IV Continuous <Continuous>      PAST MEDICAL & SURGICAL HISTORY:  Gout  PC (prostate cancer): s/p surgical resection 3 years ago  Hypercholesterolemia  HTN - Hypertension  H/O carotid endarterectomy  H/O prostatectomy    FAMILY HISTORY:  No pertinent family history in first degree relatives      SOCIAL HISTORY:  unchanged    REVIEW OF SYSTEMS:  CONSTITUTIONAL: No fever  EYES: No eye pain  ENT:  No difficulty hearing  NECK: No pain  RESPIRATORY: No SOB  CARDIOVASCULAR: No C/P  GASTROINTESTINAL: No abdominal or epigastric pain.  No melena or hematochezia.  GENITOURINARY: No dysuria  NEUROLOGICAL: No headaches, memory loss  SKIN: No rash  LYMPH Nodes: No enlarged glands noted  ENDOCRINE: No heat or cold intolerance noted  MUSCULOSKELETAL: No joint pain.   PSYCHIATRIC: No depression, anxiety noted   HEME/LYMPH: No bleeding gums  ALLERGY AND IMMUNOLOGIC: No hives    [ x] All others negative	    PHYSICAL EXAM:  T(C): 36.5 (09-01-20 @ 04:11), Max: 36.8 (08-31-20 @ 16:26)  HR: 69 (09-01-20 @ 04:11) (62 - 73)  BP: 168/76 (09-01-20 @ 05:05) (137/76 - 193/79)  RR: 20 (09-01-20 @ 04:11) (18 - 20)  SpO2: 94% (09-01-20 @ 04:11) (94% - 97%)  I&O's Summary    31 Aug 2020 07:01  -  01 Sep 2020 07:00  --------------------------------------------------------  IN: 2820 mL / OUT: 1900 mL / NET: 920 mL    01 Sep 2020 07:01  -  01 Sep 2020 09:21  --------------------------------------------------------  IN: 0 mL / OUT: 300 mL / NET: -300 mL    Appearance: NAD  HEENT: NC/AT  Cardiovascular: Normal S1 S2, No JVD,  Respiratory: Lungs clear to auscultation	  Psychiatry: A & O x 3, Mood & affect appropriate  Gastrointestinal:  Soft, Non-tender, + BS	  Skin: No rashes, No No cyanosis	  Neurologic: Non-focal  Extremities: No LE Edema  Vascular Pulse Exam: +Doppler B/L DP and PT      LABS:	 	                        8.7    3.82  )-----------( 194      ( 02 Sep 2020 08:01 )             27.8     09-02    143  |  110<H>  |  52<H>  ----------------------------<  135<H>  4.4   |  22  |  2.29<H>    Ca    8.0<L>      02 Sep 2020 08:01    TPro  5.6<L>  /  Alb  3.2<L>  /  TBili  0.4  /  DBili  x   /  AST  54<H>  /  ALT  189<H>  /  AlkPhos  126<H>  09-02    Assessment:  1. Severe L Renal Artery Stenosis     Solitary Functioning Kidney     JUAN RAMON     Uncontrolled BP  2. PAD      Bartholomew Classification 3 Claudication      No LE rest pain or Ulceration  3. HTN  4. HLD  5. PNA  6. CKD stage 3-4      Cr. down to 2.2    Plan:  1. Continue ASA 81mg and Plavix 75mg daily.  2. Plan for L Renal Angiogram today.      Informed consent obtained by MD and in chart.  3. Recommend Statin after LFTs normalize.   4. Renal function improving.      IV hydration as per Renal.   5. Continue Coreg / Norvasc.      Monitor BP post Renal Angioplasty.     Thank you  ASHLYN Garduno, [facility]    Vascular Cardiology Service  DIRECT SERVICE NUMBER [phone]  Office [phone]  email:   [email or web address] Vascular Cardiology  Progress note  DIRECT SERVICE NUMBER: [phone]            EMAIL [email or web address]   OFFICE [phone]    CC: Weakness    Interval Events:  Denies C/P or SOB.  No LE pain at rest.  No LE edema.  -180s. Received Clonidine PRN.     Allergies  No Known Allergies    MEDICATIONS  (STANDING):  amLODIPine   Tablet 5 milliGRAM(s) Oral daily  aspirin  chewable 81 milliGRAM(s) Oral daily  calcitriol   Capsule 0.25 MICROGram(s) Oral daily  carvedilol 6.25 milliGRAM(s) Oral every 12 hours  cefepime   IVPB 1000 milliGRAM(s) IV Intermittent every 24 hours  clopidogrel Tablet 75 milliGRAM(s) Oral daily  colchicine 0.3 milliGRAM(s) Oral daily  heparin   Injectable 5000 Unit(s) SubCutaneous every 12 hours  sodium chloride 0.9%. 1000 milliLiter(s) (100 mL/Hr) IV Continuous <Continuous>      PAST MEDICAL & SURGICAL HISTORY:  Gout  PC (prostate cancer): s/p surgical resection 3 years ago  Hypercholesterolemia  HTN - Hypertension  H/O carotid endarterectomy  H/O prostatectomy    FAMILY HISTORY:  No pertinent family history in first degree relatives      SOCIAL HISTORY:  unchanged    REVIEW OF SYSTEMS:  CONSTITUTIONAL: No fever  EYES: No eye pain  ENT:  No difficulty hearing  NECK: No pain  RESPIRATORY: No SOB  CARDIOVASCULAR: No C/P  GASTROINTESTINAL: No abdominal or epigastric pain.  No melena or hematochezia.  GENITOURINARY: No dysuria  NEUROLOGICAL: No headaches, memory loss  SKIN: No rash  LYMPH Nodes: No enlarged glands noted  ENDOCRINE: No heat or cold intolerance noted  MUSCULOSKELETAL: No joint pain.   PSYCHIATRIC: No depression, anxiety noted   HEME/LYMPH: No bleeding gums  ALLERGY AND IMMUNOLOGIC: No hives    [ x] All others negative	    PHYSICAL EXAM:  T(C): 36.5 (09-01-20 @ 04:11), Max: 36.8 (08-31-20 @ 16:26)  HR: 69 (09-01-20 @ 04:11) (62 - 73)  BP: 168/76 (09-01-20 @ 05:05) (137/76 - 193/79)  RR: 20 (09-01-20 @ 04:11) (18 - 20)  SpO2: 94% (09-01-20 @ 04:11) (94% - 97%)  I&O's Summary    31 Aug 2020 07:01  -  01 Sep 2020 07:00  --------------------------------------------------------  IN: 2820 mL / OUT: 1900 mL / NET: 920 mL    01 Sep 2020 07:01  -  01 Sep 2020 09:21  --------------------------------------------------------  IN: 0 mL / OUT: 300 mL / NET: -300 mL    Appearance: NAD  HEENT: NC/AT  Cardiovascular: Normal S1 S2, No JVD,  Respiratory: Lungs clear to auscultation	  Psychiatry: A & O x 3, Mood & affect appropriate  Gastrointestinal:  Soft, Non-tender, + BS	  Skin: No rashes, No No cyanosis	  Neurologic: Non-focal  Extremities: No LE Edema  Vascular Pulse Exam: +Doppler B/L DP and PT      LABS:	 	                        8.7    3.82  )-----------( 194      ( 02 Sep 2020 08:01 )             27.8     09-02    143  |  110<H>  |  52<H>  ----------------------------<  135<H>  4.4   |  22  |  2.29<H>    Ca    8.0<L>      02 Sep 2020 08:01    TPro  5.6<L>  /  Alb  3.2<L>  /  TBili  0.4  /  DBili  x   /  AST  54<H>  /  ALT  189<H>  /  AlkPhos  126<H>  09-02    Assessment:  1. Severe L Renal Artery Stenosis     Solitary Functioning Kidney     JUAN RAMON     Uncontrolled BP  2. PAD      Motley Classification 3 Claudication      No LE rest pain or Ulceration  3. HTN  4. HLD  5. PNA  6. CKD stage 3-4      Cr. down to 2.2    Plan:  1. Continue ASA 81mg and Plavix 75mg daily.  2. Plan for L Renal Angiogram today.      Informed consent obtained by MD and in chart.  3. Recommend Statin after LFTs normalize.   4. Renal function improving.      IV hydration as per Renal.   5. Continue Coreg / Norvasc.      Monitor BP post Renal Angioplasty.       BP likely to improve post Renal Artery Intervention.     Thank you  ASHLYN Garduno, [facility]    Vascular Cardiology Service  DIRECT SERVICE NUMBER [phone]  Office [phone]  email:   [email or web address]

## 2020-09-03 LAB
ALBUMIN SERPL ELPH-MCNC: 3.6 G/DL — SIGNIFICANT CHANGE UP (ref 3.3–5)
ALP SERPL-CCNC: 125 U/L — HIGH (ref 40–120)
ALT FLD-CCNC: 149 U/L — HIGH (ref 10–45)
ANION GAP SERPL CALC-SCNC: 11 MMOL/L — SIGNIFICANT CHANGE UP (ref 5–17)
AST SERPL-CCNC: 29 U/L — SIGNIFICANT CHANGE UP (ref 10–40)
BILIRUB SERPL-MCNC: 0.5 MG/DL — SIGNIFICANT CHANGE UP (ref 0.2–1.2)
BUN SERPL-MCNC: 46 MG/DL — HIGH (ref 7–23)
CALCIUM SERPL-MCNC: 8.6 MG/DL — SIGNIFICANT CHANGE UP (ref 8.4–10.5)
CHLORIDE SERPL-SCNC: 108 MMOL/L — SIGNIFICANT CHANGE UP (ref 96–108)
CO2 SERPL-SCNC: 21 MMOL/L — LOW (ref 22–31)
CREAT SERPL-MCNC: 2.09 MG/DL — HIGH (ref 0.5–1.3)
GLUCOSE SERPL-MCNC: 134 MG/DL — HIGH (ref 70–99)
HCT VFR BLD CALC: 30.6 % — LOW (ref 39–50)
HGB BLD-MCNC: 9.6 G/DL — LOW (ref 13–17)
MAGNESIUM SERPL-MCNC: 1.9 MG/DL — SIGNIFICANT CHANGE UP (ref 1.6–2.6)
MCHC RBC-ENTMCNC: 31.4 GM/DL — LOW (ref 32–36)
MCHC RBC-ENTMCNC: 35.7 PG — HIGH (ref 27–34)
MCV RBC AUTO: 113.8 FL — HIGH (ref 80–100)
NRBC # BLD: 0 /100 WBCS — SIGNIFICANT CHANGE UP (ref 0–0)
PHOSPHATE SERPL-MCNC: 3.8 MG/DL — SIGNIFICANT CHANGE UP (ref 2.5–4.5)
PLATELET # BLD AUTO: 274 K/UL — SIGNIFICANT CHANGE UP (ref 150–400)
POTASSIUM SERPL-MCNC: 4.4 MMOL/L — SIGNIFICANT CHANGE UP (ref 3.5–5.3)
POTASSIUM SERPL-SCNC: 4.4 MMOL/L — SIGNIFICANT CHANGE UP (ref 3.5–5.3)
PROT SERPL-MCNC: 6.2 G/DL — SIGNIFICANT CHANGE UP (ref 6–8.3)
RBC # BLD: 2.69 M/UL — LOW (ref 4.2–5.8)
RBC # FLD: 17.2 % — HIGH (ref 10.3–14.5)
SODIUM SERPL-SCNC: 140 MMOL/L — SIGNIFICANT CHANGE UP (ref 135–145)
WBC # BLD: 5.56 K/UL — SIGNIFICANT CHANGE UP (ref 3.8–10.5)
WBC # FLD AUTO: 5.56 K/UL — SIGNIFICANT CHANGE UP (ref 3.8–10.5)

## 2020-09-03 PROCEDURE — 93306 TTE W/DOPPLER COMPLETE: CPT | Mod: 26

## 2020-09-03 PROCEDURE — 99232 SBSQ HOSP IP/OBS MODERATE 35: CPT

## 2020-09-03 PROCEDURE — 71250 CT THORAX DX C-: CPT | Mod: 26

## 2020-09-03 PROCEDURE — 99233 SBSQ HOSP IP/OBS HIGH 50: CPT

## 2020-09-03 RX ORDER — CARVEDILOL PHOSPHATE 80 MG/1
12.5 CAPSULE, EXTENDED RELEASE ORAL EVERY 12 HOURS
Refills: 0 | Status: DISCONTINUED | OUTPATIENT
Start: 2020-09-03 | End: 2020-09-04

## 2020-09-03 RX ADMIN — HEPARIN SODIUM 5000 UNIT(S): 5000 INJECTION INTRAVENOUS; SUBCUTANEOUS at 17:57

## 2020-09-03 RX ADMIN — CARVEDILOL PHOSPHATE 6.25 MILLIGRAM(S): 80 CAPSULE, EXTENDED RELEASE ORAL at 06:25

## 2020-09-03 RX ADMIN — CILOSTAZOL 50 MILLIGRAM(S): 100 TABLET ORAL at 10:05

## 2020-09-03 RX ADMIN — CEFEPIME 100 MILLIGRAM(S): 1 INJECTION, POWDER, FOR SOLUTION INTRAMUSCULAR; INTRAVENOUS at 12:10

## 2020-09-03 RX ADMIN — AMLODIPINE BESYLATE 5 MILLIGRAM(S): 2.5 TABLET ORAL at 06:25

## 2020-09-03 RX ADMIN — Medication 81 MILLIGRAM(S): at 12:05

## 2020-09-03 RX ADMIN — CLOPIDOGREL BISULFATE 75 MILLIGRAM(S): 75 TABLET, FILM COATED ORAL at 12:06

## 2020-09-03 RX ADMIN — HEPARIN SODIUM 5000 UNIT(S): 5000 INJECTION INTRAVENOUS; SUBCUTANEOUS at 06:25

## 2020-09-03 RX ADMIN — Medication 0.3 MILLIGRAM(S): at 12:06

## 2020-09-03 RX ADMIN — CARVEDILOL PHOSPHATE 12.5 MILLIGRAM(S): 80 CAPSULE, EXTENDED RELEASE ORAL at 17:57

## 2020-09-03 RX ADMIN — CALCITRIOL 0.25 MICROGRAM(S): 0.5 CAPSULE ORAL at 12:06

## 2020-09-03 RX ADMIN — CILOSTAZOL 50 MILLIGRAM(S): 100 TABLET ORAL at 17:58

## 2020-09-03 NOTE — PROVIDER CONTACT NOTE (OTHER) - ASSESSMENT
pt AOx4, in no acute distress, VSS, pt initial dressing on right groin from procedure gauze saturated w/ blood, no oozing or drainage noted, pt denies pain at the site

## 2020-09-03 NOTE — PROGRESS NOTE ADULT - SUBJECTIVE AND OBJECTIVE BOX
Patient is a 80y old  Male who presents with a chief complaint of Weakness and fever (03 Sep 2020 09:14)      Vascular Surgery Attending Progress Note    Interval HPI: pt w/o new c/o   s/p RA BAS     Medications:  acetaminophen   Tablet .. 650 milliGRAM(s) Oral every 6 hours PRN  amLODIPine   Tablet 5 milliGRAM(s) Oral daily  aspirin  chewable 81 milliGRAM(s) Oral daily  calcitriol   Capsule 0.25 MICROGram(s) Oral daily  carvedilol 12.5 milliGRAM(s) Oral every 12 hours  cefepime   IVPB 1000 milliGRAM(s) IV Intermittent every 24 hours  cilostazol 50 milliGRAM(s) Oral every 12 hours  cloNIDine 0.1 milliGRAM(s) Oral every 6 hours PRN  clopidogrel Tablet 75 milliGRAM(s) Oral daily  colchicine 0.3 milliGRAM(s) Oral daily  heparin   Injectable 5000 Unit(s) SubCutaneous every 12 hours  sodium chloride 0.9%. 1000 milliLiter(s) IV Continuous <Continuous>      Vital Signs Last 24 Hrs  T(C): 36.7 (03 Sep 2020 06:22), Max: 36.7 (03 Sep 2020 06:22)  T(F): 98 (03 Sep 2020 06:22), Max: 98 (03 Sep 2020 06:22)  HR: 77 (03 Sep 2020 06:22) (61 - 77)  BP: 162/81 (03 Sep 2020 06:22) (146/79 - 201/89)  BP(mean): --  RR: 18 (03 Sep 2020 06:22) (16 - 18)  SpO2: 97% (03 Sep 2020 06:22) (94% - 99%)  I&O's Summary    02 Sep 2020 07:01  -  03 Sep 2020 07:00  --------------------------------------------------------  IN: 1840 mL / OUT: 1500 mL / NET: 340 mL    03 Sep 2020 07:01  -  03 Sep 2020 10:54  --------------------------------------------------------  IN: 240 mL / OUT: 0 mL / NET: 240 mL        Physical Exam:  Neuro  A&Ox3 VSS  Vascular:  stable no le acute changes           LABS:                        9.6    5.56  )-----------( 274      ( 03 Sep 2020 06:37 )             30.6     09-03    140  |  108  |  46<H>  ----------------------------<  134<H>  4.4   |  21<L>  |  2.09<H>    Ca    8.6      03 Sep 2020 06:37  Phos  3.8     09-03  Mg     1.9     09-03    TPro  6.2  /  Alb  3.6  /  TBili  0.5  /  DBili  x   /  AST  29  /  ALT  149<H>  /  AlkPhos  125<H>  09-03        PRISCILLA BAINS MD  [phone] Cell [phone]

## 2020-09-03 NOTE — PROGRESS NOTE ADULT - SUBJECTIVE AND OBJECTIVE BOX
Follow-up Pulm Progress Note    No new respiratory events overnight.  Denies SOB/CP.     Medications:  MEDICATIONS  (STANDING):  amLODIPine   Tablet 5 milliGRAM(s) Oral daily  aspirin  chewable 81 milliGRAM(s) Oral daily  calcitriol   Capsule 0.25 MICROGram(s) Oral daily  carvedilol 12.5 milliGRAM(s) Oral every 12 hours  cefepime   IVPB 1000 milliGRAM(s) IV Intermittent every 24 hours  cilostazol 50 milliGRAM(s) Oral every 12 hours  clopidogrel Tablet 75 milliGRAM(s) Oral daily  colchicine 0.3 milliGRAM(s) Oral daily  heparin   Injectable 5000 Unit(s) SubCutaneous every 12 hours  sodium chloride 0.9%. 1000 milliLiter(s) (100 mL/Hr) IV Continuous <Continuous>    MEDICATIONS  (PRN):  acetaminophen   Tablet .. 650 milliGRAM(s) Oral every 6 hours PRN Temp greater or equal to 38C (100.4F), Moderate Pain (4 - 6)  cloNIDine 0.1 milliGRAM(s) Oral every 6 hours PRN for sbp greater than 160          Vital Signs Last 24 Hrs  T(C): 36.7 (03 Sep 2020 06:22), Max: 36.7 (03 Sep 2020 06:22)  T(F): 98 (03 Sep 2020 06:22), Max: 98 (03 Sep 2020 06:22)  HR: 77 (03 Sep 2020 06:22) (61 - 77)  BP: 162/81 (03 Sep 2020 06:22) (146/79 - 201/89)  BP(mean): --  RR: 18 (03 Sep 2020 06:22) (16 - 18)  SpO2: 97% (03 Sep 2020 06:22) (94% - 99%)          09-02 @ 07:01  -  09-03 @ 07:00  --------------------------------------------------------  IN: 1840 mL / OUT: 1500 mL / NET: 340 mL          LABS:                        9.6    5.56  )-----------( 274      ( 03 Sep 2020 06:37 )             30.6     09-03    140  |  108  |  46<H>  ----------------------------<  134<H>  4.4   |  21<L>  |  2.09<H>    Ca    8.6      03 Sep 2020 06:37  Phos  3.8     09-03  Mg     1.9     09-03    TPro  6.2  /  Alb  3.6  /  TBili  0.5  /  DBili  x   /  AST  29  /  ALT  149<H>  /  AlkPhos  125<H>  09-03          ECHO Negative 08-31 @ 08:51  Anti SS-1 --  Anti SS-2 --  Anti RNP --  RF -- 08-31 @ 08:51    Atypical ANCA -- 08-31 @ 08:51  c-ANCA titer -- 08-31 @ 08:51  c-ANCA -- 08-31 @ 08:51  p-ANCA -- 08-31 @ 08:51  ECHO -- 08-31 @ 08:48  Anti SS-1 --  Anti SS-2 --  Anti RNP --  RF <10 08-31 @ 08:48    Atypical ANCA -- 08-31 @ 08:48  c-ANCA titer -- 08-31 @ 08:48  c-ANCA -- 08-31 @ 08:48  p-ANCA -- 08-31 @ 08:48              CULTURES:     Culture - Blood (collected 08-28-20 @ 15:19)  Source: .Blood Blood-Peripheral  Final Report (09-02-20 @ 16:01):    No Growth Final    Culture - Blood (collected 08-28-20 @ 15:19)  Source: .Blood Blood-Venous  Final Report (09-02-20 @ 16:01):    No Growth Final    Culture - Blood (collected 08-26-20 @ 23:06)  Source: .Blood Blood-Peripheral  Final Report (09-01-20 @ 01:00):    No Growth Final    Culture - Blood (collected 08-26-20 @ 23:06)  Source: .Blood Blood-Peripheral  Final Report (09-01-20 @ 01:00):    No Growth Final        Culture - Urine (collected 08-27-20 @ 00:37)  Source: .Urine Clean Catch (Midstream)  Final Report (08-27-20 @ 20:27):    No growth        Physical Examination:  PULM: Clear to auscultation bilaterally, no significant sputum production  CVS: RRR      RADIOLOGY REVIEWED  CT chest: < from: CT Chest No Cont (08.26.20 @ 19:07) >    FINDINGS:  CHEST:  LUNGS AND LARGE AIRWAYS: Patent central airways. Left lower lobe consolidation.  PLEURA: No pleural effusion.  VESSELS: Atherosclerotic changes of the aorta and coronary arteries.  HEART: Heart size is normal. No pericardial effusion.  MEDIASTINUM AND CARLTON: No lymphadenopathy.  CHEST WALL AND LOWER NECK: Within normal limits.    < end of copied text >

## 2020-09-03 NOTE — PROVIDER CONTACT NOTE (OTHER) - ACTION/TREATMENT ORDERED:
PA notified. Will monitor patient for now.
as per NP, leave dressing as is, no need to reinforce, assess dressing throughout shift
as per PA orders , IV tylenol will be ordered, apply ice packs as well. will continue to monitor

## 2020-09-03 NOTE — PROGRESS NOTE ADULT - ASSESSMENT
80yom pmhx of HTN HLD ,PVD prostate CA bib ems for weakness and fever for past 2 days. unable to get off the couch since yesterday. No chest pain no sob, no nausea or vomiting. +incontinence of urine; seen by PMD this week for arthritis and started on Tramadol.     Problem/Plan - 1:  ·  Problem: Sepsis.  Plan: Hemodynamically stable. S/P cultures . IV Abxs. ID help appreciated.     Clinically better.      Problem/Plan - 2:  ·  Problem: Pneumonia.  Plan: IV Abxs. Pulmonary following.      Problem/Plan - 3:  ·  Problem: Left leg pain with PVD .  Plan:  Vascular helping.      Problem/Plan - 4:  ·  Problem: CKD (chronic kidney disease) stage 3, GFR 30-59 ml/min with Metabolic Acidosis with JUAN RAMON  .  Plan: Renal following.   Creatinine  trending down.      Problem/Plan - 5:  ·  Problem: Uncontrolled Hypertension.  Plan: BP meds with hold parameters. Doppler noted.  RA Stenosis S/P successful PTRA of L renal A     Problem/Plan - 6:  Problem: Hypercholesterolemia. Plan: Statin.     Problem/Plan - 7:  ·  Problem: Gout.  Plan: Colchicine.      Problem/Plan - 8:  ·  Problem: Anemia, chronic disease with Thrombocytopenia .  Plan: CBC better.   Hematology following.      Problem/Plan - 9:  ·  Problem:  PC (prostate cancer).  Plan: Outpt follow up.      Problem/Plan - 10:  Problem: Cholelithiasis. Plan; LFT trending down . Holding statin and  US RUQ noted.    Surgery consult noted.       Problem/Plan - 11:  Problem: Renal Artery Stenosis . Plan;  S/p successful PTRA of L renal A and Vascular help appreciated.      Problem/Plan - 12:  ·  Problem:  PVD .  Plan: Management per Vascular .     Disposition : DC planning .

## 2020-09-03 NOTE — PROGRESS NOTE ADULT - ASSESSMENT
imp/rx:  systemic process leading to injury to kidneys, liver and marrow.  no skin changes.  possible pna.  sepsis could explain a multisystem change, but unclear if any infection.  Lungs noted and on cefepime empirically.  blood cx currently neg.     [id number] better      ultrasound noted         no signs of cholecystitis     sp renal angiogram\    to dc antibiotics

## 2020-09-03 NOTE — PROGRESS NOTE ADULT - PROBLEM SELECTOR PLAN 3
JUAN RAMON on CKD  -Creatinine now downtrending   -L renal artery stenosis seen on duplex, s/p PTRA 9/2  -Renal f/u.

## 2020-09-03 NOTE — PROGRESS NOTE ADULT - SUBJECTIVE AND OBJECTIVE BOX
Vascular Cardiology  Progress note  DIRECT SERVICE NUMBER: [phone]            EMAIL [email or web address]   OFFICE [phone]    CC: Fever, weakness    INTERVAL HISTORY: Yest, pt had successful PTRA of L renal A. This AM, pt denies any CP/dyspnea/palps. He denies any R groin pain/swelling. No RLE weakness/numbness.           Allergies    No Known Allergies    Intolerances    	    MEDICATIONS:  amLODIPine   Tablet 5 milliGRAM(s) Oral daily  aspirin  chewable 81 milliGRAM(s) Oral daily  carvedilol 6.25 milliGRAM(s) Oral every 12 hours  cilostazol 50 milliGRAM(s) Oral every 12 hours  cloNIDine 0.1 milliGRAM(s) Oral every 6 hours PRN  clopidogrel Tablet 75 milliGRAM(s) Oral daily  heparin   Injectable 5000 Unit(s) SubCutaneous every 12 hours    cefepime   IVPB 1000 milliGRAM(s) IV Intermittent every 24 hours      acetaminophen   Tablet .. 650 milliGRAM(s) Oral every 6 hours PRN      colchicine 0.3 milliGRAM(s) Oral daily    calcitriol   Capsule 0.25 MICROGram(s) Oral daily  sodium chloride 0.9%. 1000 milliLiter(s) IV Continuous <Continuous>      PAST MEDICAL & SURGICAL HISTORY:  Gout  PC (prostate cancer): s/p surgical resection 3 years ago  Hypercholesterolemia  HTN - Hypertension  H/O carotid endarterectomy  H/O prostatectomy      FAMILY HISTORY:  No pertinent family history in first degree relatives      SOCIAL HISTORY:  unchanged    REVIEW OF SYSTEMS:  CONSTITUTIONAL: No fever, weight loss, or fatigue  EYES: No eye pain, visual disturbances, or discharge  ENMT:  No difficulty hearing, tinnitus, vertigo; No sinus or throat pain  NECK: No pain or stiffness  RESPIRATORY: No cough, wheezing, chills or hemoptysis; No Shortness of Breath  CARDIOVASCULAR: No chest pain, palpitations, passing out, dizziness, or leg swelling  GASTROINTESTINAL: No abdominal or epigastric pain. No nausea, vomiting, or hematemesis; No diarrhea or constipation. No melena or hematochezia.  GENITOURINARY: No dysuria, frequency, hematuria, or incontinence  NEUROLOGICAL: No headaches, memory loss, loss of strength, numbness, or tremors  SKIN: No itching, burning, rashes, or lesions   LYMPH Nodes: No enlarged glands  ENDOCRINE: No heat or cold intolerance; No hair loss  MUSCULOSKELETAL: No joint pain or swelling; No muscle, back, or extremity pain  PSYCHIATRIC: No depression, anxiety, mood swings, or difficulty sleeping  HEME/LYMPH: No easy bruising, or bleeding gums  ALLERY AND IMMUNOLOGIC: No hives or eczema	    [ x] All others negative	  [ ] Unable to obtain    PHYSICAL EXAM:  T(C): 36.7 (09-03-20 @ 06:22), Max: 36.8 (09-02-20 @ 09:18)  HR: 77 (09-03-20 @ 06:22) (61 - 77)  BP: 162/81 (09-03-20 @ 06:22) (146/79 - 201/89)  RR: 18 (09-03-20 @ 06:22) (16 - 18)  SpO2: 97% (09-03-20 @ 06:22) (94% - 99%)  Wt(kg): --  I&O's Summary    02 Sep 2020 07:01  -  03 Sep 2020 07:00  --------------------------------------------------------  IN: 1840 mL / OUT: 1500 mL / NET: 340 mL        Appearance: Normal	  HEENT:   Normal oral mucosa, PERRL, EOMI	  Carotid:  Right: No bruit    Left: +L bruit  Lymphatic: No lymphadenopathy  Cardiovascular: Normal S1 S2, No JVD, No murmurs, No edema  Respiratory: Lungs clear to auscultation	  Psychiatry: A & O x 3, Mood & affect appropriate  Gastrointestinal:  Soft, Non-tender, + BS	  Skin: No rashes, No ecchymoses, No cyanosis	  Neurologic: Non-focal  Extremities: Normal range of motion, No clubbing, cyanosis.  Vascular:   Right DP:  Palpable             Left DP:  Palpable      LABS:	 	    CBC Full  -  ( 03 Sep 2020 06:37 )  WBC Count : 5.56 K/uL  Hemoglobin : 9.6 g/dL  Hematocrit : 30.6 %  Platelet Count - Automated : 274 K/uL  Mean Cell Volume : 113.8 fl  Mean Cell Hemoglobin : 35.7 pg  Mean Cell Hemoglobin Concentration : 31.4 gm/dL  Auto Neutrophil # : x  Auto Lymphocyte # : x  Auto Monocyte # : x  Auto Eosinophil # : x  Auto Basophil # : x  Auto Neutrophil % : x  Auto Lymphocyte % : x  Auto Monocyte % : x  Auto Eosinophil % : x  Auto Basophil % : x    09-03    140  |  108  |  46<H>  ----------------------------<  134<H>  4.4   |  21<L>  |  2.09<H>  09-02    143  |  110<H>  |  52<H>  ----------------------------<  135<H>  4.4   |  22  |  2.29<H>    Ca    8.6      03 Sep 2020 06:37  Ca    8.0<L>      02 Sep 2020 08:01  Phos  3.8     09-03  Mg     1.9     09-03    TPro  6.2  /  Alb  3.6  /  TBili  0.5  /  DBili  x   /  AST  29  /  ALT  149<H>  /  AlkPhos  125<H>  09-03  TPro  5.6<L>  /  Alb  3.2<L>  /  TBili  0.4  /  DBili  x   /  AST  54<H>  /  ALT  189<H>  /  AlkPhos  126<H>  09-02          Assessment:  1. Severe L Renal Artery Stenosis     Solitary Functioning Kidney     JUAN RAMON     Uncontrolled BP  2. PAD      [place] Classification 3 Claudication      No LE rest pain or Ulceration  3. HTN  4. HLD  5. PNA  6. CKD stage 3-4      Cr. down to 2.1    Plan:  1. Continue ASA 81mg and Plavix 75mg daily  2. S/p successful PTRA of L renal A yest. Groin CDI and RLE neurovasc intact.  3. Recommend Statin after LFTs normalize.   4. Renal function improving.      IV hydration as per Renal.   5. Continue Coreg / Norvasc; consider up-titration today.      Monitor BP post Renal Angioplasty.       BP likely to improve post Renal Artery Intervention.       Thank you      Vascular Cardiology Service  DIRECT SERVICE NUMBER [phone]  Office [phone]  email:   [email or web address] Vascular Cardiology  Progress note  DIRECT SERVICE NUMBER: [phone]            EMAIL [email or web address]   OFFICE [phone]    CC: Fever, weakness    INTERVAL HISTORY: Yest, pt had successful PTRA of L renal A. This AM, pt denies any CP/dyspnea/palps. He denies any R groin pain/swelling. No RLE weakness/numbness.           Allergies    No Known Allergies    Intolerances    	    MEDICATIONS:  amLODIPine   Tablet 5 milliGRAM(s) Oral daily  aspirin  chewable 81 milliGRAM(s) Oral daily  carvedilol 6.25 milliGRAM(s) Oral every 12 hours  cilostazol 50 milliGRAM(s) Oral every 12 hours  cloNIDine 0.1 milliGRAM(s) Oral every 6 hours PRN  clopidogrel Tablet 75 milliGRAM(s) Oral daily  heparin   Injectable 5000 Unit(s) SubCutaneous every 12 hours    cefepime   IVPB 1000 milliGRAM(s) IV Intermittent every 24 hours      acetaminophen   Tablet .. 650 milliGRAM(s) Oral every 6 hours PRN      colchicine 0.3 milliGRAM(s) Oral daily    calcitriol   Capsule 0.25 MICROGram(s) Oral daily  sodium chloride 0.9%. 1000 milliLiter(s) IV Continuous <Continuous>      PAST MEDICAL & SURGICAL HISTORY:  Gout  PC (prostate cancer): s/p surgical resection 3 years ago  Hypercholesterolemia  HTN - Hypertension  H/O carotid endarterectomy  H/O prostatectomy      FAMILY HISTORY:  No pertinent family history in first degree relatives      SOCIAL HISTORY:  unchanged    REVIEW OF SYSTEMS:  CONSTITUTIONAL: No fever, weight loss, or fatigue  EYES: No eye pain, visual disturbances, or discharge  ENMT:  No difficulty hearing, tinnitus, vertigo; No sinus or throat pain  NECK: No pain or stiffness  RESPIRATORY: No cough, wheezing, chills or hemoptysis; No Shortness of Breath  CARDIOVASCULAR: No chest pain, palpitations, passing out, dizziness, or leg swelling  GASTROINTESTINAL: No abdominal or epigastric pain. No nausea, vomiting, or hematemesis; No diarrhea or constipation. No melena or hematochezia.  GENITOURINARY: No dysuria, frequency, hematuria, or incontinence  NEUROLOGICAL: No headaches, memory loss, loss of strength, numbness, or tremors  SKIN: No itching, burning, rashes, or lesions   LYMPH Nodes: No enlarged glands  ENDOCRINE: No heat or cold intolerance; No hair loss  MUSCULOSKELETAL: No joint pain or swelling; No muscle, back, or extremity pain  PSYCHIATRIC: No depression, anxiety, mood swings, or difficulty sleeping  HEME/LYMPH: No easy bruising, or bleeding gums  ALLERY AND IMMUNOLOGIC: No hives or eczema	    [ x] All others negative	  [ ] Unable to obtain    PHYSICAL EXAM:  T(C): 36.7 (09-03-20 @ 06:22), Max: 36.8 (09-02-20 @ 09:18)  HR: 77 (09-03-20 @ 06:22) (61 - 77)  BP: 162/81 (09-03-20 @ 06:22) (146/79 - 201/89)  RR: 18 (09-03-20 @ 06:22) (16 - 18)  SpO2: 97% (09-03-20 @ 06:22) (94% - 99%)  Wt(kg): --  I&O's Summary    02 Sep 2020 07:01  -  03 Sep 2020 07:00  --------------------------------------------------------  IN: 1840 mL / OUT: 1500 mL / NET: 340 mL        Appearance: Normal	  HEENT:   Normal oral mucosa, PERRL, EOMI	  Carotid:  Right: No bruit    Left: +L bruit  Lymphatic: No lymphadenopathy  Cardiovascular: Normal S1 S2, No JVD, No murmurs, No edema  Respiratory: Lungs clear to auscultation	  Psychiatry: A & O x 3, Mood & affect appropriate  Gastrointestinal:  Soft, Non-tender, + BS	  Skin: No rashes, No ecchymoses, No cyanosis	  Neurologic: Non-focal  Extremities: Normal range of motion, No clubbing, cyanosis.  Vascular:   Right DP:  Palpable             Left DP:  Palpable      LABS:	 	    CBC Full  -  ( 03 Sep 2020 06:37 )  WBC Count : 5.56 K/uL  Hemoglobin : 9.6 g/dL  Hematocrit : 30.6 %  Platelet Count - Automated : 274 K/uL  Mean Cell Volume : 113.8 fl  Mean Cell Hemoglobin : 35.7 pg  Mean Cell Hemoglobin Concentration : 31.4 gm/dL  Auto Neutrophil # : x  Auto Lymphocyte # : x  Auto Monocyte # : x  Auto Eosinophil # : x  Auto Basophil # : x  Auto Neutrophil % : x  Auto Lymphocyte % : x  Auto Monocyte % : x  Auto Eosinophil % : x  Auto Basophil % : x    09-03    140  |  108  |  46<H>  ----------------------------<  134<H>  4.4   |  21<L>  |  2.09<H>  09-02    143  |  110<H>  |  52<H>  ----------------------------<  135<H>  4.4   |  22  |  2.29<H>    Ca    8.6      03 Sep 2020 06:37  Ca    8.0<L>      02 Sep 2020 08:01  Phos  3.8     09-03  Mg     1.9     09-03    TPro  6.2  /  Alb  3.6  /  TBili  0.5  /  DBili  x   /  AST  29  /  ALT  149<H>  /  AlkPhos  125<H>  09-03  TPro  5.6<L>  /  Alb  3.2<L>  /  TBili  0.4  /  DBili  x   /  AST  54<H>  /  ALT  189<H>  /  AlkPhos  126<H>  09-02          Assessment:  1. Severe L Renal Artery Stenosis     Solitary Functioning Kidney     JUAN RAMON     Uncontrolled BP  2. PAD      [place] Classification 3 Claudication      No LE rest pain or Ulceration  3. HTN  4. HLD  5. PNA  6. CKD stage 3-4      Cr. down to 2.1    Plan:  1. Continue ASA 81mg and Plavix 75mg daily  2. S/p successful PTRA of L renal A yest. Groin CDI and RLE neurovasc intact.  3. Recommend Statin after LFTs normalize.   4. Renal function improving.      IV hydration as per Renal.   5. Continue amlod 5mg daily. Would incr carvedilol to 12.5mg BID      Monitor BP post Renal Angioplasty.       BP likely to improve post Renal Artery Intervention.   6. Pt started on cilostazol for PAD. Would check TTE to lencho connors.      Thank you      Vascular Cardiology Service  DIRECT SERVICE NUMBER [phone]  Office [phone]  email:   [email or web address] [email or web address]  Progress note  DIRECT SERVICE NUMBER: [phone]            EMAIL [email or web address]   OFFICE [phone]    CC: Fever, weakness    INTERVAL HISTORY: Yest, pt had successful PTRA of L renal A. This AM, pt denies any CP/dyspnea/palps. He denies any R groin pain/swelling. No RLE weakness/numbness.           Allergies    No Known Allergies    Intolerances    	    MEDICATIONS:  amLODIPine   Tablet 5 milliGRAM(s) Oral daily  aspirin  chewable 81 milliGRAM(s) Oral daily  carvedilol 6.25 milliGRAM(s) Oral every 12 hours  cilostazol 50 milliGRAM(s) Oral every 12 hours  cloNIDine 0.1 milliGRAM(s) Oral every 6 hours PRN  clopidogrel Tablet 75 milliGRAM(s) Oral daily  heparin   Injectable 5000 Unit(s) SubCutaneous every 12 hours    cefepime   IVPB 1000 milliGRAM(s) IV Intermittent every 24 hours      acetaminophen   Tablet .. 650 milliGRAM(s) Oral every 6 hours PRN      colchicine 0.3 milliGRAM(s) Oral daily    calcitriol   Capsule 0.25 MICROGram(s) Oral daily  sodium chloride 0.9%. 1000 milliLiter(s) IV Continuous <Continuous>      PAST MEDICAL & SURGICAL HISTORY:  Gout  PC (prostate cancer): s/p surgical resection 3 years ago  Hypercholesterolemia  HTN - Hypertension  H/O carotid endarterectomy  H/O prostatectomy      FAMILY HISTORY:  No pertinent family history in first degree relatives      SOCIAL HISTORY:  unchanged    REVIEW OF SYSTEMS:  CONSTITUTIONAL: No fever, weight loss, or fatigue  EYES: No eye pain, visual disturbances, or discharge  ENMT:  No difficulty hearing, tinnitus, vertigo; No sinus or throat pain  NECK: No pain or stiffness  RESPIRATORY: No cough, wheezing, chills or hemoptysis; No Shortness of Breath  CARDIOVASCULAR: No chest pain, palpitations, passing out, dizziness, or leg swelling  GASTROINTESTINAL: No abdominal or epigastric pain. No nausea, vomiting, or hematemesis; No diarrhea or constipation. No melena or hematochezia.  GENITOURINARY: No dysuria, frequency, hematuria, or incontinence  NEUROLOGICAL: No headaches, memory loss, loss of strength, numbness, or tremors  SKIN: No itching, burning, rashes, or lesions   LYMPH Nodes: No enlarged glands  ENDOCRINE: No heat or cold intolerance; No hair loss  MUSCULOSKELETAL: No joint pain or swelling; No muscle, back, or extremity pain  PSYCHIATRIC: No depression, anxiety, mood swings, or difficulty sleeping  HEME/LYMPH: No easy bruising, or bleeding gums  ALLERY AND IMMUNOLOGIC: No hives or eczema	    [ x] All others negative	  [ ] Unable to obtain    PHYSICAL EXAM:  T(C): 36.7 (09-03-20 @ 06:22), Max: 36.8 (09-02-20 @ 09:18)  HR: 77 (09-03-20 @ 06:22) (61 - 77)  BP: 162/81 (09-03-20 @ 06:22) (146/79 - 201/89)  RR: 18 (09-03-20 @ 06:22) (16 - 18)  SpO2: 97% (09-03-20 @ 06:22) (94% - 99%)  Wt(kg): --  I&O's Summary    02 Sep 2020 07:01  -  03 Sep 2020 07:00  --------------------------------------------------------  IN: 1840 mL / OUT: 1500 mL / NET: 340 mL        Appearance: Normal	  HEENT:   Normal oral mucosa, PERRL, EOMI	  Carotid:  Right: No bruit    Left: +L bruit  Lymphatic: No lymphadenopathy  Cardiovascular: Normal S1 S2, No JVD, No murmurs, No edema  Respiratory: Lungs clear to auscultation	  Psychiatry: A & O x 3, Mood & affect appropriate  Gastrointestinal:  Soft, Non-tender, + BS	  Skin: No rashes, No ecchymoses, No cyanosis	  Neurologic: Non-focal  Extremities: Normal range of motion, No clubbing, cyanosis.         LABS:	 	    CBC Full  -  ( 03 Sep 2020 06:37 )  WBC Count : 5.56 K/uL  Hemoglobin : 9.6 g/dL  Hematocrit : 30.6 %  Platelet Count - Automated : 274 K/uL  Mean Cell Volume : 113.8 fl  Mean Cell Hemoglobin : 35.7 pg  Mean Cell Hemoglobin Concentration : 31.4 gm/dL  Auto Neutrophil # : x  Auto Lymphocyte # : x  Auto Monocyte # : x  Auto Eosinophil # : x  Auto Basophil # : x  Auto Neutrophil % : x  Auto Lymphocyte % : x  Auto Monocyte % : x  Auto Eosinophil % : x  Auto Basophil % : x    09-03    140  |  108  |  46<H>  ----------------------------<  134<H>  4.4   |  21<L>  |  2.09<H>  09-02    143  |  110<H>  |  52<H>  ----------------------------<  135<H>  4.4   |  22  |  2.29<H>    Ca    8.6      03 Sep 2020 06:37  Ca    8.0<L>      02 Sep 2020 08:01  Phos  3.8     09-03  Mg     1.9     09-03    TPro  6.2  /  Alb  3.6  /  TBili  0.5  /  DBili  x   /  AST  29  /  ALT  149<H>  /  AlkPhos  125<H>  09-03  TPro  5.6<L>  /  Alb  3.2<L>  /  TBili  0.4  /  DBili  x   /  AST  54<H>  /  ALT  189<H>  /  AlkPhos  126<H>  09-02          Assessment:  1. Severe L Renal Artery Stenosis     Solitary Functioning Kidney     JUAN RAMON     Uncontrolled BP  2. PAD      [place] Classification 3 Claudication      No LE rest pain or Ulceration  3. HTN  4. HLD  5. PNA  6. CKD stage 3-4      Cr. down to 2.1    Plan:  1. Continue ASA 81mg and Plavix 75mg daily  2. S/p successful PTRA of L renal A yest. Groin CDI and RLE neurovasc intact.  3. Recommend Statin after LFTs normalize.   4. Renal function improving.      IV hydration as per Renal.   5. Continue amlod 5mg daily. Would incr carvedilol to 12.5mg BID      Monitor BP post Renal Angioplasty.       BP likely to improve post Renal Artery Intervention.   6. Pt started on cilostazol for PAD. Would check TTE to eval LV fxn.      Thank you      Vascular Cardiology Service  DIRECT SERVICE NUMBER [phone]  Office [phone]  email:   [email or web address]

## 2020-09-03 NOTE — PROGRESS NOTE ADULT - SUBJECTIVE AND OBJECTIVE BOX
infectious diseases progress note:    Patient is a 80y old  Male who presents with a chief complaint of weakness and fever (02 Sep 2020 21:57)        Sepsis             No Known Allergies    Intolerances        ANTIBIOTICS/RELEVANT:  antimicrobials  cefepime   IVPB 1000 milliGRAM(s) IV Intermittent every 24 hours    immunologic:    OTHER:  acetaminophen   Tablet .. 650 milliGRAM(s) Oral every 6 hours PRN  amLODIPine   Tablet 5 milliGRAM(s) Oral daily  aspirin  chewable 81 milliGRAM(s) Oral daily  calcitriol   Capsule 0.25 MICROGram(s) Oral daily  carvedilol 6.25 milliGRAM(s) Oral every 12 hours  cilostazol 50 milliGRAM(s) Oral every 12 hours  cloNIDine 0.1 milliGRAM(s) Oral every 6 hours PRN  clopidogrel Tablet 75 milliGRAM(s) Oral daily  colchicine 0.3 milliGRAM(s) Oral daily  heparin   Injectable 5000 Unit(s) SubCutaneous every 12 hours  sodium chloride 0.9%. 1000 milliLiter(s) IV Continuous <Continuous>      Objective:  Vital Signs Last 24 Hrs  T(C): 36.7 (03 Sep 2020 06:22), Max: 36.8 (02 Sep 2020 09:18)  T(F): 98 (03 Sep 2020 06:22), Max: 98.2 (02 Sep 2020 09:18)  HR: 77 (03 Sep 2020 06:22) (61 - 77)  BP: 162/81 (03 Sep 2020 06:22) (146/79 - 201/89)  BP(mean): --  RR: 18 (03 Sep 2020 06:22) (16 - 18)  SpO2: 97% (03 Sep 2020 06:22) (94% - 99%)       Eyes:CRESCENCIO, EOMI  Ear/Nose/Throat: no oral lesion, no sinus tenderness on percussion	  Neck:no JVD, no lymphadenopathy, supple  Respiratory: CTA raghu  Cardiovascular: S1S2 RRR, no murmurs  Gastrointestinal:soft, (+) BS, no HSM  Extremities:no e/e/c        LABS:                        9.6    5.56  )-----------( 274      ( 03 Sep 2020 06:37 )             30.6     09-03    140  |  108  |  46<H>  ----------------------------<  134<H>  4.4   |  21<L>  |  2.09<H>    Ca    8.6      03 Sep 2020 06:37  Phos  3.8     09-03  Mg     1.9     09-03    TPro  6.2  /  Alb  3.6  /  TBili  0.5  /  DBili  x   /  AST  29  /  ALT  149<H>  /  AlkPhos  125<H>  09-03            MICROBIOLOGY:    RECENT CULTURES:  08-28 @ 15:19 .Blood Blood-Venous                No Growth Final          RESPIRATORY CULTURES:              RADIOLOGY & ADDITIONAL STUDIES:        Pager [phone]  After 5 pm/weekends or if no response :[phone]

## 2020-09-03 NOTE — PROGRESS NOTE ADULT - SUBJECTIVE AND OBJECTIVE BOX
Overnight events noted      VITAL:  T(C): , Max: 36.7 (09-03-20 @ 06:22)  T(F): , Max: 98 (09-03-20 @ 06:22)  HR: 77 (09-03-20 @ 06:22)  BP: 162/81 (09-03-20 @ 06:22)  BP(mean): --  RR: 18 (09-03-20 @ 06:22)  SpO2: 97% (09-03-20 @ 06:22)      PHYSICAL EXAM:  Constitutional: NAD, Alert  HEENT: NCAT, DMM  Neck: Supple, No JVD  Respiratory: CTA-b/l  Cardiovascular: RRR s1s2, no m/r/g  Gastrointestinal: BS+, soft, NT/ND  Extremities: No peripheral edema b/l  Neurological: no focal deficits; strength grossly intact  Back: no CVAT b/l  Skin: No rashes, no nevi    LABS:                        9.6    5.56  )-----------( 274      ( 03 Sep 2020 06:37 )             30.6     Na(140)/K(4.4)/Cl(108)/HCO3(21)/BUN(46)/Cr(2.09)Glu(134)/Ca(8.6)/Mg(1.9)/PO4(3.8)    09-03 @ 06:37  Na(143)/K(4.4)/Cl(110)/HCO3(22)/BUN(52)/Cr(2.29)Glu(135)/Ca(8.0)/Mg(--)/PO4(--)    09-02 @ 08:01  Na(143)/K(4.1)/Cl(107)/HCO3(23)/BUN(60)/Cr(2.77)Glu(124)/Ca(8.1)/Mg(--)/PO4(--)    09-01 @ 07:39      IMPRESSION: 80M w/ HTN, gout, PAD, and prostate CA s/p resection, 8/26/20 a/w PNA    (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 Atrophic right kidney; due to renal artery stenosis.    (2)JUAN RAMON -  hemodynamic/USHA - improving with time, IVF, and further improved as of today s/p left PTRA yesterday    (3)Vasc - severe left renal artery stenosis; s/p L-PTRA yesterday    (4)CV - BP high       RECOMMEND:  (1)Increase Coreg to 12.5mg po bid; maintain Norvasc 5qd as ordered  (2)No renal objection to discharge; would have him f/u at my office in 1-2 weeks          Edis Cabral MD  [facility]  Office: [phone]  Cell: [phone] No pain, no sob. Voiding well.      VITAL:  T(C): , Max: 36.7 (09-03-20 @ 06:22)  T(F): , Max: 98 (09-03-20 @ 06:22)  HR: 77 (09-03-20 @ 06:22)  BP: 162/81 (09-03-20 @ 06:22)  RR: 18 (09-03-20 @ 06:22)  SpO2: 97% (09-03-20 @ 06:22)      PHYSICAL EXAM:  Constitutional: NAD, Alert  HEENT: NCAT, DMM  Neck: Supple, No JVD  Respiratory: CTA-b/l  Cardiovascular: RRR s1s2, no m/r/g  Gastrointestinal: BS+, soft, NT/ND  Extremities: No peripheral edema b/l  Neurological: no focal deficits; strength grossly intact  Back: no CVAT b/l  Skin: No rashes, no nevi    LABS:                        9.6    5.56  )-----------( 274      ( 03 Sep 2020 06:37 )             30.6     Na(140)/K(4.4)/Cl(108)/HCO3(21)/BUN(46)/Cr(2.09)Glu(134)/Ca(8.6)/Mg(1.9)/PO4(3.8)    09-03 @ 06:37  Na(143)/K(4.4)/Cl(110)/HCO3(22)/BUN(52)/Cr(2.29)Glu(135)/Ca(8.0)/Mg(--)/PO4(--)    09-02 @ 08:01  Na(143)/K(4.1)/Cl(107)/HCO3(23)/BUN(60)/Cr(2.77)Glu(124)/Ca(8.1)/Mg(--)/PO4(--)    09-01 @ 07:39      IMPRESSION: 80M w/ HTN, gout, PAD, and prostate CA s/p resection, 8/26/20 a/w PNA    (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 Atrophic right kidney; due to renal artery stenosis.    (2)JUAN RAMON -  hemodynamic/USHA - improving with time, IVF, and further improved as of today s/p left PTRA yesterday    (3)Vasc - severe left renal artery stenosis; s/p L-PTRA yesterday    (4)CV - BP high       RECOMMEND:  (1)Increase Coreg to 12.5mg po bid; maintain Norvasc 5qd as ordered  (2)No renal objection to discharge; would have him f/u at my office in 1-2 weeks          Edis Cabral MD  [facility]  Office: [phone]  Cell: [phone]

## 2020-09-03 NOTE — PROGRESS NOTE ADULT - SUBJECTIVE AND OBJECTIVE BOX
INTERVAL HPI/OVERNIGHT EVENTS: I feel fine.   Vital Signs Last 24 Hrs  T(C): 36.8 (03 Sep 2020 16:54), Max: 36.9 (03 Sep 2020 12:58)  T(F): 98.3 (03 Sep 2020 16:54), Max: 98.5 (03 Sep 2020 12:58)  HR: 91 (03 Sep 2020 16:54) (69 - 91)  BP: 125/74 (03 Sep 2020 16:54) (125/74 - 173/79)  BP(mean): --  RR: 18 (03 Sep 2020 16:54) (18 - 18)  SpO2: 97% (03 Sep 2020 16:54) (94% - 98%)  I&O's Summary    02 Sep 2020 07:01  -  03 Sep 2020 07:00  --------------------------------------------------------  IN: 1840 mL / OUT: 1500 mL / NET: 340 mL    03 Sep 2020 07:01  -  03 Sep 2020 17:46  --------------------------------------------------------  IN: 530 mL / OUT: 0 mL / NET: 530 mL      MEDICATIONS  (STANDING):  amLODIPine   Tablet 5 milliGRAM(s) Oral daily  aspirin  chewable 81 milliGRAM(s) Oral daily  calcitriol   Capsule 0.25 MICROGram(s) Oral daily  carvedilol 12.5 milliGRAM(s) Oral every 12 hours  cilostazol 50 milliGRAM(s) Oral every 12 hours  clopidogrel Tablet 75 milliGRAM(s) Oral daily  colchicine 0.3 milliGRAM(s) Oral daily  heparin   Injectable 5000 Unit(s) SubCutaneous every 12 hours    MEDICATIONS  (PRN):  acetaminophen   Tablet .. 650 milliGRAM(s) Oral every 6 hours PRN Temp greater or equal to 38C (100.4F), Moderate Pain (4 - 6)  cloNIDine 0.1 milliGRAM(s) Oral every 6 hours PRN for sbp greater than 160    LABS:                        9.6    5.56  )-----------( 274      ( 03 Sep 2020 06:37 )             30.6     09-03    140  |  108  |  46<H>  ----------------------------<  134<H>  4.4   |  21<L>  |  2.09<H>    Ca    8.6      03 Sep 2020 06:37  Phos  3.8     09-03  Mg     1.9     09-03    TPro  6.2  /  Alb  3.6  /  TBili  0.5  /  DBili  x   /  AST  29  /  ALT  149<H>  /  AlkPhos  125<H>  09-03        CAPILLARY BLOOD GLUCOSE              REVIEW OF SYSTEMS:  CONSTITUTIONAL: No fever, weight loss, or fatigue  EYES: No eye pain, visual disturbances, or discharge  ENMT:  No difficulty hearing, tinnitus, vertigo; No sinus or throat pain  RESPIRATORY: No cough, wheezing, chills or hemoptysis; No shortness of breath  CARDIOVASCULAR: No chest pain, palpitations, dizziness, or leg swelling  GASTROINTESTINAL: No abdominal or epigastric pain. No nausea, vomiting, or hematemesis; No diarrhea or constipation. No melena or hematochezia.  GENITOURINARY: No dysuria, frequency, hematuria, or incontinence  NEUROLOGICAL: No headaches, memory loss, loss of strength, numbness, or tremors      RADIOLOGY & ADDITIONAL TESTS:    Consultant(s) Notes Reviewed:  [x ] YES  [ ] NO    PHYSICAL EXAM:  GENERAL: NAD, well-groomed, well-developed,not in any distress ,  HEAD:  Atraumatic, Normocephalic  EYES: EOMI, PERRLA, conjunctiva and sclera clear  ENMT: No tonsillar erythema, exudates, or enlargement; Moist mucous membranes, Good dentition, No lesions  NECK: Supple, No JVD, Normal thyroid  NERVOUS SYSTEM:  Alert & Oriented X3, No focal deficit   CHEST/LUNG: Good air entry bilateral with no  rales, rhonchi, wheezing, or rubs  HEART: Regular rate and rhythm; No murmurs, rubs, or gallops  ABDOMEN: Soft, Nontender, Nondistended; Bowel sounds present  EXTREMITIES:  2+ Peripheral Pulses, No clubbing, cyanosis, or edema  SKIN: No rashes or lesions    Care Discussed with Consultants/Other Providers [ x] YES  [ ] NO

## 2020-09-03 NOTE — PROGRESS NOTE ADULT - ASSESSMENT
81yo M pmhx of HTN, HLD, TIA/possible stroke in 2012 s/p carotid endarterectomy, past inferior MI ,prostate CA s/p prostatectomy, OA of hips and legs, CKD3, gout, presented with LLE pain and difficulty ambulating.     Plan:  - pletal 50 bid for le art insuff  stable from vasc surg standpoint for d/c   f/u as outpt in 2 mo  reconsult prn

## 2020-09-04 ENCOUNTER — TRANSCRIPTION ENCOUNTER (OUTPATIENT)
Age: 80
End: 2020-09-04

## 2020-09-04 VITALS
SYSTOLIC BLOOD PRESSURE: 155 MMHG | RESPIRATION RATE: 18 BRPM | HEART RATE: 78 BPM | TEMPERATURE: 98 F | DIASTOLIC BLOOD PRESSURE: 77 MMHG | OXYGEN SATURATION: 99 %

## 2020-09-04 LAB
ALBUMIN SERPL ELPH-MCNC: 3.5 G/DL — SIGNIFICANT CHANGE UP (ref 3.3–5)
ALP SERPL-CCNC: 103 U/L — SIGNIFICANT CHANGE UP (ref 40–120)
ALT FLD-CCNC: 106 U/L — HIGH (ref 10–45)
ANION GAP SERPL CALC-SCNC: 12 MMOL/L — SIGNIFICANT CHANGE UP (ref 5–17)
AST SERPL-CCNC: 21 U/L — SIGNIFICANT CHANGE UP (ref 10–40)
BILIRUB SERPL-MCNC: 0.4 MG/DL — SIGNIFICANT CHANGE UP (ref 0.2–1.2)
BUN SERPL-MCNC: 46 MG/DL — HIGH (ref 7–23)
CALCIUM SERPL-MCNC: 8.8 MG/DL — SIGNIFICANT CHANGE UP (ref 8.4–10.5)
CHLORIDE SERPL-SCNC: 109 MMOL/L — HIGH (ref 96–108)
CO2 SERPL-SCNC: 21 MMOL/L — LOW (ref 22–31)
CREAT SERPL-MCNC: 2.27 MG/DL — HIGH (ref 0.5–1.3)
GLUCOSE SERPL-MCNC: 132 MG/DL — HIGH (ref 70–99)
HCT VFR BLD CALC: 28.2 % — LOW (ref 39–50)
HGB BLD-MCNC: 8.9 G/DL — LOW (ref 13–17)
MCHC RBC-ENTMCNC: 31.6 GM/DL — LOW (ref 32–36)
MCHC RBC-ENTMCNC: 35.7 PG — HIGH (ref 27–34)
MCV RBC AUTO: 113.3 FL — HIGH (ref 80–100)
NRBC # BLD: 0 /100 WBCS — SIGNIFICANT CHANGE UP (ref 0–0)
PLATELET # BLD AUTO: 259 K/UL — SIGNIFICANT CHANGE UP (ref 150–400)
POTASSIUM SERPL-MCNC: 4.6 MMOL/L — SIGNIFICANT CHANGE UP (ref 3.5–5.3)
POTASSIUM SERPL-SCNC: 4.6 MMOL/L — SIGNIFICANT CHANGE UP (ref 3.5–5.3)
PROT SERPL-MCNC: 5.7 G/DL — LOW (ref 6–8.3)
RBC # BLD: 2.49 M/UL — LOW (ref 4.2–5.8)
RBC # FLD: 17.3 % — HIGH (ref 10.3–14.5)
SODIUM SERPL-SCNC: 142 MMOL/L — SIGNIFICANT CHANGE UP (ref 135–145)
WBC # BLD: 5.89 K/UL — SIGNIFICANT CHANGE UP (ref 3.8–10.5)
WBC # FLD AUTO: 5.89 K/UL — SIGNIFICANT CHANGE UP (ref 3.8–10.5)

## 2020-09-04 PROCEDURE — 86789 WEST NILE VIRUS ANTIBODY: CPT

## 2020-09-04 PROCEDURE — 93005 ELECTROCARDIOGRAM TRACING: CPT | Mod: XU

## 2020-09-04 PROCEDURE — 84133 ASSAY OF URINE POTASSIUM: CPT

## 2020-09-04 PROCEDURE — 82436 ASSAY OF URINE CHLORIDE: CPT

## 2020-09-04 PROCEDURE — 87040 BLOOD CULTURE FOR BACTERIA: CPT

## 2020-09-04 PROCEDURE — 80053 COMPREHEN METABOLIC PANEL: CPT

## 2020-09-04 PROCEDURE — 84100 ASSAY OF PHOSPHORUS: CPT

## 2020-09-04 PROCEDURE — 93923 UPR/LXTR ART STDY 3+ LVLS: CPT

## 2020-09-04 PROCEDURE — 71045 X-RAY EXAM CHEST 1 VIEW: CPT

## 2020-09-04 PROCEDURE — 85384 FIBRINOGEN ACTIVITY: CPT

## 2020-09-04 PROCEDURE — 73700 CT LOWER EXTREMITY W/O DYE: CPT

## 2020-09-04 PROCEDURE — 51701 INSERT BLADDER CATHETER: CPT

## 2020-09-04 PROCEDURE — 81001 URINALYSIS AUTO W/SCOPE: CPT

## 2020-09-04 PROCEDURE — 99232 SBSQ HOSP IP/OBS MODERATE 35: CPT

## 2020-09-04 PROCEDURE — 83540 ASSAY OF IRON: CPT

## 2020-09-04 PROCEDURE — 84550 ASSAY OF BLOOD/URIC ACID: CPT

## 2020-09-04 PROCEDURE — 80074 ACUTE HEPATITIS PANEL: CPT

## 2020-09-04 PROCEDURE — 99285 EMERGENCY DEPT VISIT HI MDM: CPT | Mod: 25

## 2020-09-04 PROCEDURE — 85027 COMPLETE CBC AUTOMATED: CPT

## 2020-09-04 PROCEDURE — 82607 VITAMIN B-12: CPT

## 2020-09-04 PROCEDURE — 93306 TTE W/DOPPLER COMPLETE: CPT

## 2020-09-04 PROCEDURE — 85730 THROMBOPLASTIN TIME PARTIAL: CPT

## 2020-09-04 PROCEDURE — 71250 CT THORAX DX C-: CPT

## 2020-09-04 PROCEDURE — 86431 RHEUMATOID FACTOR QUANT: CPT

## 2020-09-04 PROCEDURE — 86769 SARS-COV-2 COVID-19 ANTIBODY: CPT

## 2020-09-04 PROCEDURE — 86334 IMMUNOFIX E-PHORESIS SERUM: CPT

## 2020-09-04 PROCEDURE — 83010 ASSAY OF HAPTOGLOBIN QUANT: CPT

## 2020-09-04 PROCEDURE — 84155 ASSAY OF PROTEIN SERUM: CPT

## 2020-09-04 PROCEDURE — 74176 CT ABD & PELVIS W/O CONTRAST: CPT

## 2020-09-04 PROCEDURE — 82728 ASSAY OF FERRITIN: CPT

## 2020-09-04 PROCEDURE — C1769: CPT

## 2020-09-04 PROCEDURE — 82746 ASSAY OF FOLIC ACID SERUM: CPT

## 2020-09-04 PROCEDURE — U0003: CPT

## 2020-09-04 PROCEDURE — 83605 ASSAY OF LACTIC ACID: CPT

## 2020-09-04 PROCEDURE — 85379 FIBRIN DEGRADATION QUANT: CPT

## 2020-09-04 PROCEDURE — 84300 ASSAY OF URINE SODIUM: CPT

## 2020-09-04 PROCEDURE — C1887: CPT

## 2020-09-04 PROCEDURE — 83550 IRON BINDING TEST: CPT

## 2020-09-04 PROCEDURE — 82570 ASSAY OF URINE CREATININE: CPT

## 2020-09-04 PROCEDURE — 84156 ASSAY OF PROTEIN URINE: CPT

## 2020-09-04 PROCEDURE — 93971 EXTREMITY STUDY: CPT

## 2020-09-04 PROCEDURE — C1894: CPT

## 2020-09-04 PROCEDURE — 97116 GAIT TRAINING THERAPY: CPT

## 2020-09-04 PROCEDURE — 86788 WEST NILE VIRUS AB IGM: CPT

## 2020-09-04 PROCEDURE — 85652 RBC SED RATE AUTOMATED: CPT

## 2020-09-04 PROCEDURE — 96374 THER/PROPH/DIAG INJ IV PUSH: CPT | Mod: XU

## 2020-09-04 PROCEDURE — 97161 PT EVAL LOW COMPLEX 20 MIN: CPT

## 2020-09-04 PROCEDURE — 87086 URINE CULTURE/COLONY COUNT: CPT

## 2020-09-04 PROCEDURE — 96361 HYDRATE IV INFUSION ADD-ON: CPT | Mod: XU

## 2020-09-04 PROCEDURE — 86038 ANTINUCLEAR ANTIBODIES: CPT

## 2020-09-04 PROCEDURE — 86140 C-REACTIVE PROTEIN: CPT

## 2020-09-04 PROCEDURE — 85610 PROTHROMBIN TIME: CPT

## 2020-09-04 PROCEDURE — C1876: CPT

## 2020-09-04 PROCEDURE — 37236 OPEN/PERQ PLACE STENT 1ST: CPT

## 2020-09-04 PROCEDURE — 97530 THERAPEUTIC ACTIVITIES: CPT

## 2020-09-04 PROCEDURE — 82550 ASSAY OF CK (CPK): CPT

## 2020-09-04 PROCEDURE — 84165 PROTEIN E-PHORESIS SERUM: CPT

## 2020-09-04 PROCEDURE — 36251 INS CATH REN ART 1ST UNILAT: CPT

## 2020-09-04 PROCEDURE — 93975 VASCULAR STUDY: CPT

## 2020-09-04 PROCEDURE — 76700 US EXAM ABDOM COMPLETE: CPT

## 2020-09-04 PROCEDURE — 87449 NOS EACH ORGANISM AG IA: CPT

## 2020-09-04 PROCEDURE — 83735 ASSAY OF MAGNESIUM: CPT

## 2020-09-04 PROCEDURE — 84443 ASSAY THYROID STIM HORMONE: CPT

## 2020-09-04 RX ORDER — ACETAMINOPHEN 500 MG
0 TABLET ORAL
Qty: 0 | Refills: 0 | DISCHARGE

## 2020-09-04 RX ORDER — LOSARTAN POTASSIUM 100 MG/1
1 TABLET, FILM COATED ORAL
Qty: 0 | Refills: 0 | DISCHARGE

## 2020-09-04 RX ORDER — CARVEDILOL PHOSPHATE 80 MG/1
1 CAPSULE, EXTENDED RELEASE ORAL
Qty: 60 | Refills: 0
Start: 2020-09-04 | End: 2020-10-03

## 2020-09-04 RX ORDER — CILOSTAZOL 100 MG/1
1 TABLET ORAL
Qty: 60 | Refills: 0
Start: 2020-09-04 | End: 2020-10-03

## 2020-09-04 RX ADMIN — CARVEDILOL PHOSPHATE 12.5 MILLIGRAM(S): 80 CAPSULE, EXTENDED RELEASE ORAL at 06:33

## 2020-09-04 RX ADMIN — Medication 0.3 MILLIGRAM(S): at 12:04

## 2020-09-04 RX ADMIN — HEPARIN SODIUM 5000 UNIT(S): 5000 INJECTION INTRAVENOUS; SUBCUTANEOUS at 06:31

## 2020-09-04 RX ADMIN — HEPARIN SODIUM 5000 UNIT(S): 5000 INJECTION INTRAVENOUS; SUBCUTANEOUS at 17:37

## 2020-09-04 RX ADMIN — CILOSTAZOL 50 MILLIGRAM(S): 100 TABLET ORAL at 06:31

## 2020-09-04 RX ADMIN — AMLODIPINE BESYLATE 5 MILLIGRAM(S): 2.5 TABLET ORAL at 06:31

## 2020-09-04 RX ADMIN — CALCITRIOL 0.25 MICROGRAM(S): 0.5 CAPSULE ORAL at 12:04

## 2020-09-04 RX ADMIN — CLOPIDOGREL BISULFATE 75 MILLIGRAM(S): 75 TABLET, FILM COATED ORAL at 12:04

## 2020-09-04 RX ADMIN — Medication 81 MILLIGRAM(S): at 12:04

## 2020-09-04 RX ADMIN — CARVEDILOL PHOSPHATE 12.5 MILLIGRAM(S): 80 CAPSULE, EXTENDED RELEASE ORAL at 17:36

## 2020-09-04 RX ADMIN — CILOSTAZOL 50 MILLIGRAM(S): 100 TABLET ORAL at 17:36

## 2020-09-04 NOTE — DISCHARGE NOTE PROVIDER - NSDCCPCAREPLAN_GEN_ALL_CORE_FT
PRINCIPAL DISCHARGE DIAGNOSIS  Diagnosis: Sepsis  Assessment and Plan of Treatment: You completed course of  IV antibiotics.  Call you Health care provider upon arrival home to make a one week follow up appointment.  If you develop fever, chills, malaise, or change in mental status call your Health Care Provider or go to the Emergency Department.  Nutrition is important, eat small frequent meals to help ensure you get adequate calories.  Do not stay in bed all day!  Increase your activity daily as tolerated.        SECONDARY DISCHARGE DIAGNOSES  Diagnosis: Arterial insufficiency of lower extremity  Assessment and Plan of Treatment: Follow up with Dr Reddy in 1 week.    Diagnosis: CKD (chronic kidney disease)  Assessment and Plan of Treatment: Follow up with Dr Cabral in 1 week.    Diagnosis: Hypertension  Assessment and Plan of Treatment: Take your medication as prescribed.  Follow up with your medical doctor for routine blood pressure monitoring, and to establish long term blood pressure treatment goals.  Low salt diet  Activity as tolerated.  Notify your doctor if you have any of the following symptoms:   Dizziness, Lightheadedness, Blurry vision, Headache, Chest pain, Shortness of breath      Diagnosis: Gout  Assessment and Plan of Treatment: Take your medication as prescribed.   Follow up with your medical doctor for routine blood  work monitoring, and to establish long term treatment goals.

## 2020-09-04 NOTE — DISCHARGE NOTE PROVIDER - NSDCMRMEDTOKEN_GEN_ALL_CORE_FT
3:1 Commode:   aspirin-dipyridamole 25 mg-200 mg oral capsule, extended release: 1 cap(s) orally 2 times a day  calcitriol 0.25 mcg oral capsule: 1 cap(s) orally 3 times a week  note: last dispensed March 2020 as 90 day supply  Colcrys 0.6 mg oral tablet: 1 tab(s) orally once a day  Coreg 12.5 mg oral tablet: 1 tab(s) orally once a day  losartan 100 mg oral tablet: 1 tab(s) orally once a day  rosuvastatin 10 mg oral tablet: 1 tab(s) orally once a day  traMADol-acetaminophen 37.5mg-325mg oral tablet: 2 tab(s) orally 2 times a day, As Needed  Tylenol: as needed 3:1 Commode:   amLODIPine 5 mg oral tablet: 1 tab(s) orally once a day  aspirin 81 mg oral tablet, chewable: 1 tab(s) orally once a day  calcitriol 0.25 mcg oral capsule: 1 cap(s) orally 3 times a week  note: last dispensed March 2020 as 90 day supply  carvedilol 12.5 mg oral tablet: 1 tab(s) orally every 12 hours  cilostazol 50 mg oral tablet: 1 tab(s) orally every 12 hours  clopidogrel 75 mg oral tablet: 1 tab(s) orally once a day  Colcrys 0.6 mg oral tablet: 1 tab(s) orally once a day  rosuvastatin 10 mg oral tablet: 1 tab(s) orally once a day  traMADol-acetaminophen 37.5mg-325mg oral tablet: 2 tab(s) orally 2 times a day, As Needed

## 2020-09-04 NOTE — DISCHARGE NOTE PROVIDER - HOSPITAL COURSE
80yom pmhx of HTN HLD ,PVD prostate CA bib ems for weakness and fever for past 2 days. unable to get off the couch since yesterday. No chest pain no sob, no nausea or vomiting. +incontinence of urine; seen by PMD this week for arthritis and started on Tramadol.    Sepsis: Hemodynamically stable. S/P cultures . IV Abxs. ID help appreciated.      Clinically better.     Pneumonia: Pulmonary following. Completed course of IV Abxs.     Left leg pain with PVD:  Vascular helping.     CKD (chronic kidney disease) stage 3, GFR 30-59 ml/min with Metabolic Acidosis with JUAN RAMON  .  Plan: Renal following.     Creatinine  trending down.     Uncontrolled Hypertension: BP meds with hold parameters. Doppler noted.    RA Stenosis S/P successful PTRA of L renal A    Hypercholesterolemia: Statin.    Gout: Colchicine.     Anemia, chronic disease with Thrombocytopenia: CBC better.   Hematology following.         PC (prostate cancer): Outpt follow up.         Cholelithiasis. Plan; LFT trending down . Holding statin and  US RUQ noted.      Surgery consult noted.      Renal Artery Stenosis ;  S/p successful PTRA of L renal A and Vascular help appreciated.     PVD: Management per Vascular.    Pt medically stable for discharge per Med Attending Dr Wiggins.

## 2020-09-04 NOTE — PROGRESS NOTE ADULT - SUBJECTIVE AND OBJECTIVE BOX
Vascular Cardiology  Progress note  DIRECT SERVICE NUMBER: [phone]            EMAIL [email or web address]   OFFICE [phone]    CC: Weakness and fever    INTERVAL HISTORY: NAEO. This AM, pt remains well w/o complaints. His BP is much improved and normotensive range now. He denies any LH/dizziness w/ walking. He denies any CP/dyspnea/palps. No groin pain/swelling. RLE remains neurovasc intact.           Allergies    No Known Allergies    Intolerances    	    MEDICATIONS:  amLODIPine   Tablet 5 milliGRAM(s) Oral daily  aspirin  chewable 81 milliGRAM(s) Oral daily  carvedilol 12.5 milliGRAM(s) Oral every 12 hours  cilostazol 50 milliGRAM(s) Oral every 12 hours  clopidogrel Tablet 75 milliGRAM(s) Oral daily  heparin   Injectable 5000 Unit(s) SubCutaneous every 12 hours        acetaminophen   Tablet .. 650 milliGRAM(s) Oral every 6 hours PRN      colchicine 0.3 milliGRAM(s) Oral daily    calcitriol   Capsule 0.25 MICROGram(s) Oral daily      PAST MEDICAL & SURGICAL HISTORY:  Gout  PC (prostate cancer): s/p surgical resection 3 years ago  Hypercholesterolemia  HTN - Hypertension  H/O carotid endarterectomy  H/O prostatectomy      FAMILY HISTORY:  No pertinent family history in first degree relatives      SOCIAL HISTORY:  unchanged    REVIEW OF SYSTEMS:  CONSTITUTIONAL: No fever, weight loss, or fatigue  EYES: No eye pain, visual disturbances, or discharge  ENMT:  No difficulty hearing, tinnitus, vertigo; No sinus or throat pain  NECK: No pain or stiffness  RESPIRATORY: No cough, wheezing, chills or hemoptysis; No Shortness of Breath  CARDIOVASCULAR: No chest pain, palpitations, passing out, dizziness, or leg swelling  GASTROINTESTINAL: No abdominal or epigastric pain. No nausea, vomiting, or hematemesis; No diarrhea or constipation. No melena or hematochezia.  GENITOURINARY: No dysuria, frequency, hematuria, or incontinence  NEUROLOGICAL: No headaches, memory loss, loss of strength, numbness, or tremors  SKIN: No itching, burning, rashes, or lesions   LYMPH Nodes: No enlarged glands  ENDOCRINE: No heat or cold intolerance; No hair loss  MUSCULOSKELETAL: No joint pain or swelling; No muscle, back, or extremity pain  PSYCHIATRIC: No depression, anxiety, mood swings, or difficulty sleeping  HEME/LYMPH: No easy bruising, or bleeding gums  ALLERY AND IMMUNOLOGIC: No hives or eczema	    [ x] All others negative	  [ ] Unable to obtain    PHYSICAL EXAM:  T(C): 36.8 (09-04-20 @ 06:29), Max: 36.9 (09-03-20 @ 12:58)  HR: 85 (09-04-20 @ 06:29) (77 - 91)  BP: 136/78 (09-04-20 @ 06:29) (101/62 - 138/69)  RR: 18 (09-04-20 @ 06:29) (18 - 18)  SpO2: 94% (09-04-20 @ 06:29) (94% - 97%)  Wt(kg): --  I&O's Summary    03 Sep 2020 07:01  -  04 Sep 2020 07:00  --------------------------------------------------------  IN: 1010 mL / OUT: 850 mL / NET: 160 mL    04 Sep 2020 07:01  -  04 Sep 2020 09:37  --------------------------------------------------------  IN: 120 mL / OUT: 0 mL / NET: 120 mL        Appearance: Normal	  HEENT:   Normal oral mucosa, PERRL, EOMI	  Carotid:  Right: +bruit    Left:  +bruit  Lymphatic: No lymphadenopathy  Cardiovascular: Normal S1 S2, No JVD, No murmurs, No edema  Respiratory: Lungs clear to auscultation	  Psychiatry: A & O x 3, Mood & affect appropriate  Gastrointestinal:  Soft, Non-tender, + BS	  Skin: No rashes, No ecchymoses, No cyanosis	  Neurologic: Non-focal  Extremities: Normal range of motion, No clubbing, cyanosis.  Vascular:   Right DP:  Palpable             Left DP:  Palpable    LABS:	 	    CBC Full  -  ( 04 Sep 2020 07:15 )  WBC Count : 5.89 K/uL  Hemoglobin : 8.9 g/dL  Hematocrit : 28.2 %  Platelet Count - Automated : 259 K/uL  Mean Cell Volume : 113.3 fl  Mean Cell Hemoglobin : 35.7 pg  Mean Cell Hemoglobin Concentration : 31.6 gm/dL  Auto Neutrophil # : x  Auto Lymphocyte # : x  Auto Monocyte # : x  Auto Eosinophil # : x  Auto Basophil # : x  Auto Neutrophil % : x  Auto Lymphocyte % : x  Auto Monocyte % : x  Auto Eosinophil % : x  Auto Basophil % : x    09-04    142  |  109<H>  |  46<H>  ----------------------------<  132<H>  4.6   |  21<L>  |  2.27<H>  09-03    140  |  108  |  46<H>  ----------------------------<  134<H>  4.4   |  21<L>  |  2.09<H>    Ca    8.8      04 Sep 2020 07:14  Ca    8.6      03 Sep 2020 06:37  Phos  3.8     09-03  Mg     1.9     09-03    TPro  5.7<L>  /  Alb  3.5  /  TBili  0.4  /  DBili  x   /  AST  21  /  ALT  106<H>  /  AlkPhos  103  09-04  TPro  6.2  /  Alb  3.6  /  TBili  0.5  /  DBili  x   /  AST  29  /  ALT  149<H>  /  AlkPhos  125<H>  09-03          Assessment:  1. Severe L Renal Artery Stenosis     Solitary Functioning Kidney     JUAN RAMON     Uncontrolled BP     S/p L PTRA now  2. PAD      Chapis Classification 3 Claudication      No LE rest pain or Ulceration  3. HTN  4. HLD  5. PNA  6. CKD stage 3-4      Cr. down to 2.1    Plan:  1. Continue ASA 81mg and Plavix 75mg daily; will need new Rx for stent protection  2. Recommend Statin after LFTs normalize.   3. Trend Cr; renal fxn likely stabilized now w/ Cr ~2.1     IV hydration as per Renal.   4. Continue amlod 5mg daily. C/w carvedilol 12.5mg BID      Monitor BP post Renal Angioplasty.       BP normotensive now; will need to be cautious to avoid over-correction. Pt advised to ambulate cautiously.  5. Pt started on cilostazol for PAD. TTE w/ normal LV fxn  6. Will need b/l carotid duplex as outpt         Thank you      Vascular Cardiology Service  DIRECT SERVICE NUMBER [phone]  Office [phone]  email:   [email or web address] Vascular [email or web address]  Progress note  DIRECT SERVICE NUMBER: [phone]            EMAIL [email or web address]   OFFICE [phone]    CC: Weakness and fever    INTERVAL HISTORY: NAEO. This AM, pt remains well w/o complaints. His BP is much improved and normotensive range now. He denies any LH/dizziness w/ walking. He denies any CP/dyspnea/palps. No groin pain/swelling. RLE remains neurovasc intact.           Allergies    No Known Allergies    Intolerances    	    MEDICATIONS:  amLODIPine   Tablet 5 milliGRAM(s) Oral daily  aspirin  chewable 81 milliGRAM(s) Oral daily  carvedilol 12.5 milliGRAM(s) Oral every 12 hours  cilostazol 50 milliGRAM(s) Oral every 12 hours  clopidogrel Tablet 75 milliGRAM(s) Oral daily  heparin   Injectable 5000 Unit(s) SubCutaneous every 12 hours        acetaminophen   Tablet .. 650 milliGRAM(s) Oral every 6 hours PRN      colchicine 0.3 milliGRAM(s) Oral daily    calcitriol   Capsule 0.25 MICROGram(s) Oral daily      PAST MEDICAL & SURGICAL HISTORY:  Gout  PC (prostate cancer): s/p surgical resection 3 years ago  Hypercholesterolemia  HTN - Hypertension  H/O carotid endarterectomy  H/O prostatectomy      FAMILY HISTORY:  No pertinent family history in first degree relatives      SOCIAL HISTORY:  unchanged    REVIEW OF SYSTEMS:  CONSTITUTIONAL: No fever, weight loss, or fatigue  EYES: No eye pain, visual disturbances, or discharge  ENMT:  No difficulty hearing, tinnitus, vertigo; No sinus or throat pain  NECK: No pain or stiffness  RESPIRATORY: No cough, wheezing, chills or hemoptysis; No Shortness of Breath  CARDIOVASCULAR: No chest pain, palpitations, passing out, dizziness, or leg swelling  GASTROINTESTINAL: No abdominal or epigastric pain. No nausea, vomiting, or hematemesis; No diarrhea or constipation. No melena or hematochezia.  GENITOURINARY: No dysuria, frequency, hematuria, or incontinence  NEUROLOGICAL: No headaches, memory loss, loss of strength, numbness, or tremors  SKIN: No itching, burning, rashes, or lesions   LYMPH Nodes: No enlarged glands  ENDOCRINE: No heat or cold intolerance; No hair loss  MUSCULOSKELETAL: No joint pain or swelling; No muscle, back, or extremity pain  PSYCHIATRIC: No depression, anxiety, mood swings, or difficulty sleeping  HEME/LYMPH: No easy bruising, or bleeding gums  ALLERY AND IMMUNOLOGIC: No hives or eczema	    [ x] All others negative	  [ ] Unable to obtain    PHYSICAL EXAM:  T(C): 36.8 (09-04-20 @ 06:29), Max: 36.9 (09-03-20 @ 12:58)  HR: 85 (09-04-20 @ 06:29) (77 - 91)  BP: 136/78 (09-04-20 @ 06:29) (101/62 - 138/69)  RR: 18 (09-04-20 @ 06:29) (18 - 18)  SpO2: 94% (09-04-20 @ 06:29) (94% - 97%)  Wt(kg): --  I&O's Summary    03 Sep 2020 07:01  -  04 Sep 2020 07:00  --------------------------------------------------------  IN: 1010 mL / OUT: 850 mL / NET: 160 mL    04 Sep 2020 07:01  -  04 Sep 2020 09:37  --------------------------------------------------------  IN: 120 mL / OUT: 0 mL / NET: 120 mL        Appearance: Normal	  HEENT:   Normal oral mucosa, PERRL, EOMI	  Carotid:  Right: +bruit    Left:  +bruit  Lymphatic: No lymphadenopathy  Cardiovascular: Normal S1 S2, No JVD, No murmurs, No edema  Respiratory: Lungs clear to auscultation	  Psychiatry: A & O x 3, Mood & affect appropriate  Gastrointestinal:  Soft, Non-tender, + BS	  Skin: No rashes, No ecchymoses, No cyanosis	  Neurologic: Non-focal  Extremities: Normal range of motion, No clubbing, cyanosis.  Vascular:   Right DP:  Palpable             Left DP:  Palpable    LABS:	 	    CBC Full  -  ( 04 Sep 2020 07:15 )  WBC Count : 5.89 K/uL  Hemoglobin : 8.9 g/dL  Hematocrit : 28.2 %  Platelet Count - Automated : 259 K/uL  Mean Cell Volume : 113.3 fl  Mean Cell Hemoglobin : 35.7 pg  Mean Cell Hemoglobin Concentration : 31.6 gm/dL  Auto Neutrophil # : x  Auto Lymphocyte # : x  Auto Monocyte # : x  Auto Eosinophil # : x  Auto Basophil # : x  Auto Neutrophil % : x  Auto Lymphocyte % : x  Auto Monocyte % : x  Auto Eosinophil % : x  Auto Basophil % : x    09-04    142  |  109<H>  |  46<H>  ----------------------------<  132<H>  4.6   |  21<L>  |  2.27<H>  09-03    140  |  108  |  46<H>  ----------------------------<  134<H>  4.4   |  21<L>  |  2.09<H>    Ca    8.8      04 Sep 2020 07:14  Ca    8.6      03 Sep 2020 06:37  Phos  3.8     09-03  Mg     1.9     09-03    TPro  5.7<L>  /  Alb  3.5  /  TBili  0.4  /  DBili  x   /  AST  21  /  ALT  106<H>  /  AlkPhos  103  09-04  TPro  6.2  /  Alb  3.6  /  TBili  0.5  /  DBili  x   /  AST  29  /  ALT  149<H>  /  AlkPhos  125<H>  09-03          Assessment:  1. Severe L Renal Artery Stenosis     Solitary Functioning Kidney     JUAN RAMON     Uncontrolled BP     S/p L PTRA now  2. PAD      Chapis Classification 3 Claudication      No LE rest pain or Ulceration  3. HTN  4. HLD  5. PNA  6. CKD stage 3-4      Cr. down to 2.1    Plan:  1. Continue ASA 81mg and Plavix 75mg daily; will need new Rx for stent protection  2. Recommend Statin after LFTs normalize.   3. Trend Cr; renal fxn likely stabilized now w/ Cr ~2.1     IV hydration as per Renal.   4. Continue amlod 5mg daily. C/w carvedilol 12.5mg BID      Monitor BP post Renal Angioplasty.       BP normotensive now; will need to be cautious to avoid over-correction. Pt advised to ambulate cautiously.  5. Pt started on cilostazol for PAD. TTE w/ normal LV fxn  6. Will need b/l carotid duplex as outpt  7. No barriers to d/c from cards perspective. Pt will be arranged for f/u in 1-2 weeks.         Thank you      Vascular Cardiology Service  DIRECT SERVICE NUMBER [phone]  Office [phone]  email:   [email or web address] Vascular [email or web address]  Progress note  DIRECT SERVICE NUMBER: [phone]            EMAIL [email or web address]   OFFICE [phone]    CC: Weakness and fever    INTERVAL HISTORY: NAEO. This AM, pt remains well w/o complaints. His BP is much improved and normotensive range now. He denies any LH/dizziness w/ walking. He denies any CP/dyspnea/palps. No groin pain/swelling. RLE remains neurovasc intact.           Allergies    No Known Allergies    Intolerances    	    MEDICATIONS:  amLODIPine   Tablet 5 milliGRAM(s) Oral daily  aspirin  chewable 81 milliGRAM(s) Oral daily  carvedilol 12.5 milliGRAM(s) Oral every 12 hours  cilostazol 50 milliGRAM(s) Oral every 12 hours  clopidogrel Tablet 75 milliGRAM(s) Oral daily  heparin   Injectable 5000 Unit(s) SubCutaneous every 12 hours        acetaminophen   Tablet .. 650 milliGRAM(s) Oral every 6 hours PRN      colchicine 0.3 milliGRAM(s) Oral daily    calcitriol   Capsule 0.25 MICROGram(s) Oral daily      PAST MEDICAL & SURGICAL HISTORY:  Gout  PC (prostate cancer): s/p surgical resection 3 years ago  Hypercholesterolemia  HTN - Hypertension  H/O carotid endarterectomy  H/O prostatectomy      FAMILY HISTORY:  No pertinent family history in first degree relatives      SOCIAL HISTORY:  unchanged    REVIEW OF SYSTEMS:  CONSTITUTIONAL: No fever, weight loss, or fatigue  EYES: No eye pain, visual disturbances, or discharge  ENMT:  No difficulty hearing, tinnitus, vertigo; No sinus or throat pain  NECK: No pain or stiffness  RESPIRATORY: No cough, wheezing, chills or hemoptysis; No Shortness of Breath  CARDIOVASCULAR: No chest pain, palpitations, passing out, dizziness, or leg swelling  GASTROINTESTINAL: No abdominal or epigastric pain. No nausea, vomiting, or hematemesis; No diarrhea or constipation. No melena or hematochezia.  GENITOURINARY: No dysuria, frequency, hematuria, or incontinence  NEUROLOGICAL: No headaches, memory loss, loss of strength, numbness, or tremors  SKIN: No itching, burning, rashes, or lesions   LYMPH Nodes: No enlarged glands  ENDOCRINE: No heat or cold intolerance; No hair loss  MUSCULOSKELETAL: No joint pain or swelling; No muscle, back, or extremity pain  PSYCHIATRIC: No depression, anxiety, mood swings, or difficulty sleeping  HEME/LYMPH: No easy bruising, or bleeding gums  ALLERY AND IMMUNOLOGIC: No hives or eczema	    [ x] All others negative	  [ ] Unable to obtain    PHYSICAL EXAM:  T(C): 36.8 (09-04-20 @ 06:29), Max: 36.9 (09-03-20 @ 12:58)  HR: 85 (09-04-20 @ 06:29) (77 - 91)  BP: 136/78 (09-04-20 @ 06:29) (101/62 - 138/69)  RR: 18 (09-04-20 @ 06:29) (18 - 18)  SpO2: 94% (09-04-20 @ 06:29) (94% - 97%)  Wt(kg): --  I&O's Summary    03 Sep 2020 07:01  -  04 Sep 2020 07:00  --------------------------------------------------------  IN: 1010 mL / OUT: 850 mL / NET: 160 mL    04 Sep 2020 07:01  -  04 Sep 2020 09:37  --------------------------------------------------------  IN: 120 mL / OUT: 0 mL / NET: 120 mL        Appearance: Normal	  HEENT:   Normal oral mucosa, PERRL, EOMI	  Carotid:  Right: +bruit    Left:  +bruit  Lymphatic: No lymphadenopathy  Cardiovascular: Normal S1 S2, No JVD, No murmurs, No edema  Respiratory: Lungs clear to auscultation	  Psychiatry: A & O x 3, Mood & affect appropriate  Gastrointestinal:  Soft, Non-tender, + BS	  Skin: No rashes, No ecchymoses, No cyanosis	  Neurologic: Non-focal  Extremities: Normal range of motion, No clubbing, cyanosis.       LABS:	 	    CBC Full  -  ( 04 Sep 2020 07:15 )  WBC Count : 5.89 K/uL  Hemoglobin : 8.9 g/dL  Hematocrit : 28.2 %  Platelet Count - Automated : 259 K/uL  Mean Cell Volume : 113.3 fl  Mean Cell Hemoglobin : 35.7 pg  Mean Cell Hemoglobin Concentration : 31.6 gm/dL  Auto Neutrophil # : x  Auto Lymphocyte # : x  Auto Monocyte # : x  Auto Eosinophil # : x  Auto Basophil # : x  Auto Neutrophil % : x  Auto Lymphocyte % : x  Auto Monocyte % : x  Auto Eosinophil % : x  Auto Basophil % : x    09-04    142  |  109<H>  |  46<H>  ----------------------------<  132<H>  4.6   |  21<L>  |  2.27<H>  09-03    140  |  108  |  46<H>  ----------------------------<  134<H>  4.4   |  21<L>  |  2.09<H>    Ca    8.8      04 Sep 2020 07:14  Ca    8.6      03 Sep 2020 06:37  Phos  3.8     09-03  Mg     1.9     09-03    TPro  5.7<L>  /  Alb  3.5  /  TBili  0.4  /  DBili  x   /  AST  21  /  ALT  106<H>  /  AlkPhos  103  09-04  TPro  6.2  /  Alb  3.6  /  TBili  0.5  /  DBili  x   /  AST  29  /  ALT  149<H>  /  AlkPhos  125<H>  09-03          Assessment:  1. Severe L Renal Artery Stenosis     Solitary Functioning Kidney     JUAN RAMON     Uncontrolled BP     S/p L PTRA now  2. PAD      Chapis Classification 3 Claudication      No LE rest pain or Ulceration  3. HTN  4. HLD  5. PNA  6. CKD stage 3-4      Cr. down to 2.1    Plan:  1. Continue ASA 81mg and Plavix 75mg daily; will need new Rx for stent protection  2. Recommend Statin after LFTs normalize.   3. Trend Cr; renal fxn likely stabilized now w/ Cr ~2.1     IV hydration as per Renal.   4. Continue amlod 5mg daily. C/w carvedilol 12.5mg BID      Monitor BP post Renal Angioplasty.       BP normotensive now; will need to be cautious to avoid over-correction. Pt advised to ambulate cautiously.  5. Pt started on cilostazol for PAD. TTE w/ normal LV fxn  6. Will need b/l carotid duplex as outpt  7. No barriers to d/c from cards perspective. Pt will be arranged for f/u in 1-2 weeks.         Thank you      Vascular Cardiology Service  DIRECT SERVICE NUMBER [phone]  Office [phone]  email:   [email or web address]

## 2020-09-04 NOTE — DISCHARGE NOTE PROVIDER - CARE PROVIDER_API CALL
Gabriel Reddy  CARDIOVASCULAR DISEASE  [address] [place] [address]  Phone: [phone]  Fax: [phone]  Follow Up Time: 1 week    Edis Cabral)  Internal Medicine; Nephrology  [address] [facility] [address]  [place] [address]  Phone: [phone]  Fax: [phone]  Follow Up Time: 1 week    Issac Doyle  CRITICAL CARE MEDICINE  [address] [place] [facility] [address]  [place] [address]  Phone: [phone]  Fax: [phone]  Follow Up Time: 1 week

## 2020-09-04 NOTE — PROGRESS NOTE ADULT - SUBJECTIVE AND OBJECTIVE BOX
infectious diseases progress note:    Patient is a 80y old  Male who presents with a chief complaint of weakness and fever (03 Sep 2020 11:45)        Sepsis             Allergies    No Known Allergies    Intolerances        ANTIBIOTICS/RELEVANT:  antimicrobials    immunologic:    OTHER:  acetaminophen   Tablet .. 650 milliGRAM(s) Oral every 6 hours PRN  amLODIPine   Tablet 5 milliGRAM(s) Oral daily  aspirin  chewable 81 milliGRAM(s) Oral daily  calcitriol   Capsule 0.25 MICROGram(s) Oral daily  carvedilol 12.5 milliGRAM(s) Oral every 12 hours  cilostazol 50 milliGRAM(s) Oral every 12 hours  clopidogrel Tablet 75 milliGRAM(s) Oral daily  colchicine 0.3 milliGRAM(s) Oral daily  heparin   Injectable 5000 Unit(s) SubCutaneous every 12 hours      Objective:  Vital Signs Last 24 Hrs  T(C): 36.8 (04 Sep 2020 06:29), Max: 36.9 (03 Sep 2020 12:58)  T(F): 98.2 (04 Sep 2020 06:29), Max: 98.5 (03 Sep 2020 12:58)  HR: 85 (04 Sep 2020 06:29) (77 - 91)  BP: 136/78 (04 Sep 2020 06:29) (101/62 - 138/69)  BP(mean): --  RR: 18 (04 Sep 2020 06:29) (18 - 18)  SpO2: 94% (04 Sep 2020 06:29) (94% - 97%)       Eyes:CRESCENCIO, EOMI  Ear/Nose/Throat: no oral lesion, no sinus tenderness on percussion	  Neck:no JVD, no lymphadenopathy, supple  Respiratory: CTA raghu  Cardiovascular: S1S2 RRR, no murmurs  Gastrointestinal:soft, (+) BS, no HSM  Extremities:no e/e/c        LABS:                        8.9    5.89  )-----------( 259      ( 04 Sep 2020 07:15 )             28.2     09-04    142  |  109<H>  |  46<H>  ----------------------------<  132<H>  4.6   |  21<L>  |  2.27<H>    Ca    8.8      04 Sep 2020 07:14  Phos  3.8     09-03  Mg     1.9     09-03    TPro  5.7<L>  /  Alb  3.5  /  TBili  0.4  /  DBili  x   /  AST  21  /  ALT  106<H>  /  AlkPhos  103  09-04            MICROBIOLOGY:    RECENT CULTURES:  08-28 @ 15:19 .Blood Blood-Venous                No Growth Final          RESPIRATORY CULTURES:              RADIOLOGY & ADDITIONAL STUDIES:        Pager [phone]  After 5 pm/weekends or if no response :[phone]

## 2020-09-04 NOTE — PROGRESS NOTE ADULT - PROVIDER SPECIALTY LIST ADULT
Heme/Onc
Heme/Onc
Infectious Disease
Internal Medicine
Nephrology
Pulmonology
Surgery
Vascular Cardiology
Vascular Surgery
Surgery
Infectious Disease
Infectious Disease
Surgery
Internal Medicine
Internal Medicine
Vascular Surgery
Vascular Surgery

## 2020-09-04 NOTE — PROGRESS NOTE ADULT - ASSESSMENT
imp/rx:  systemic process leading to injury to kidneys, liver and marrow.  no skin changes.  possible pna.  sepsis could explain a multisystem change, but unclear if any infection.  Lungs noted and on cefepime empirically.  blood cx currently neg.     [id number] better      ultrasound noted         no signs of cholecystitis     sp renal angiogram\    off  antibiotics   discharge planning

## 2020-09-04 NOTE — DISCHARGE NOTE PROVIDER - CARE PROVIDERS DIRECT ADDRESSES
[email or web address]

## 2020-09-04 NOTE — PROGRESS NOTE ADULT - ASSESSMENT
80yom pmhx of HTN HLD ,PVD prostate CA bib ems for weakness and fever for past 2 days. unable to get off the couch since yesterday. No chest pain no sob, no nausea or vomiting. +incontinence of urine; seen by PMD this week for arthritis and started on Tramadol.     Problem/Plan - 1:  ·  Problem: Sepsis.  Plan: Likely sec to Pneumonia .Hemodynamically stable. S/P cultures . IV Abxs. ID help appreciated.     Clinically better.      Problem/Plan - 2:  ·  Problem: Pneumonia.  Plan: IV Abxs. Pulmonary following. Resolving .     Problem/Plan - 3:  ·  Problem: Left leg pain with PVD .  Plan:  Vascular helping.      Problem/Plan - 4:  ·  Problem: CKD (chronic kidney disease) stage 3, GFR 30-59 ml/min with Metabolic Acidosis with JUAN RAMON  .  Plan: Renal following.   Creatinine  trending down.      Problem/Plan - 5:  ·  Problem: Uncontrolled Hypertension.  Plan: BP meds with hold parameters. Doppler noted.  RA Stenosis S/P successful PTRA of L renal A     Problem/Plan - 6:  Problem: Hypercholesterolemia. Plan: Statin.     Problem/Plan - 7:  ·  Problem: Gout.  Plan: Colchicine.      Problem/Plan - 8:  ·  Problem: Anemia, chronic disease with Thrombocytopenia .  Plan: CBC better.   Hematology following.      Problem/Plan - 9:  ·  Problem:  PC (prostate cancer).  Plan: Outpt follow up.      Problem/Plan - 10:  Problem: Cholelithiasis. Plan; LFT trending down . Holding statin and  US RUQ noted.    Surgery consult noted.       Problem/Plan - 11:  Problem: Renal Artery Stenosis . Plan;  S/p successful PTRA of L renal A and Vascular help appreciated.      Problem/Plan - 12:  ·  Problem:  PVD .  Plan: Management per Vascular .     Disposition : DC planning home to f/u outpt with PCP,Urology Renal  and Vascular  .

## 2020-09-04 NOTE — PROGRESS NOTE ADULT - NSHPATTENDINGPLANDISCUSS_GEN_ALL_CORE
pt
pt and acp
pt
pt and NP
pt and acp
pt and renal attending.
surg ho
pt and acp
medicine np
surg ho

## 2020-09-04 NOTE — PROGRESS NOTE ADULT - SUBJECTIVE AND OBJECTIVE BOX
Follow-up Pulm Progress Note    No new respiratory events overnight.  Denies SOB/CP.     Medications:  MEDICATIONS  (STANDING):  amLODIPine   Tablet 5 milliGRAM(s) Oral daily  aspirin  chewable 81 milliGRAM(s) Oral daily  calcitriol   Capsule 0.25 MICROGram(s) Oral daily  carvedilol 12.5 milliGRAM(s) Oral every 12 hours  cilostazol 50 milliGRAM(s) Oral every 12 hours  clopidogrel Tablet 75 milliGRAM(s) Oral daily  colchicine 0.3 milliGRAM(s) Oral daily  heparin   Injectable 5000 Unit(s) SubCutaneous every 12 hours    MEDICATIONS  (PRN):  acetaminophen   Tablet .. 650 milliGRAM(s) Oral every 6 hours PRN Temp greater or equal to 38C (100.4F), Moderate Pain (4 - 6)          Vital Signs Last 24 Hrs  T(C): 36.8 (04 Sep 2020 06:29), Max: 36.9 (03 Sep 2020 12:58)  T(F): 98.2 (04 Sep 2020 06:29), Max: 98.5 (03 Sep 2020 12:58)  HR: 85 (04 Sep 2020 06:29) (77 - 91)  BP: 136/78 (04 Sep 2020 06:29) (101/62 - 138/69)  BP(mean): --  RR: 18 (04 Sep 2020 06:29) (18 - 18)  SpO2: 94% (04 Sep 2020 06:29) (94% - 97%)          09-03 @ 07:01  -  09-04 @ 07:00  --------------------------------------------------------  IN: 1010 mL / OUT: 850 mL / NET: 160 mL          LABS:                        8.9    5.89  )-----------( 259      ( 04 Sep 2020 07:15 )             28.2     09-04    142  |  109<H>  |  46<H>  ----------------------------<  132<H>  4.6   |  21<L>  |  2.27<H>    Ca    8.8      04 Sep 2020 07:14  Phos  3.8     09-03  Mg     1.9     09-03    TPro  5.7<L>  /  Alb  3.5  /  TBili  0.4  /  DBili  x   /  AST  21  /  ALT  106<H>  /  AlkPhos  103  09-04            ECHO Negative 08-31 @ 08:51  Anti SS-1 --  Anti SS-2 --  Anti RNP --  RF -- 08-31 @ 08:51    Atypical ANCA -- 08-31 @ 08:51  c-ANCA titer -- 08-31 @ 08:51  c-ANCA -- 08-31 @ 08:51  p-ANCA -- 08-31 @ 08:51  ECHO -- 08-31 @ 08:48  Anti SS-1 --  Anti SS-2 --  Anti RNP --  RF <10 08-31 @ 08:48    Atypical ANCA -- 08-31 @ 08:48  c-ANCA titer -- 08-31 @ 08:48  c-ANCA -- 08-31 @ 08:48  p-ANCA -- 08-31 @ 08:48              CULTURES:     Culture - Blood (collected 08-28-20 @ 15:19)  Source: .Blood Blood-Peripheral  Final Report (09-02-20 @ 16:01):    No Growth Final    Culture - Blood (collected 08-28-20 @ 15:19)  Source: .Blood Blood-Venous  Final Report (09-02-20 @ 16:01):    No Growth Final    Culture - Blood (collected 08-26-20 @ 23:06)  Source: .Blood Blood-Peripheral  Final Report (09-01-20 @ 01:00):    No Growth Final    Culture - Blood (collected 08-26-20 @ 23:06)  Source: .Blood Blood-Peripheral  Final Report (09-01-20 @ 01:00):    No Growth Final        Culture - Urine (collected 08-27-20 @ 00:37)  Source: .Urine Clean Catch (Midstream)  Final Report (08-27-20 @ 20:27):    No growth        Physical Examination:  PULM: Clear to auscultation bilaterally, no significant sputum production  CVS: RRR    RADIOLOGY REVIEWED  CT chest: < from: CT Chest No Cont (09.03.20 @ 18:16) >  FINDINGS:    AIRWAYS AND LUNGS: The central tracheobronchial tree is patent.  Patchy left lower lobe opacity is decreased but not resolved compared to the prior study.    MEDIASTINUM AND PLEURA: There are no enlarged mediastinal, hilar or axillary lymph nodes. The visualized portion of the thyroid gland is unremarkable. Small left pleural effusion. There is no pneumothorax.    HEART AND VESSELS: There is mild cardiomegaly.  There are atherosclerotic calcifications of the aorta and coronary arteries.  There is no pericardial effusion.    UPPER ABDOMEN: Images of the upper abdomen demonstrate cholelithiasis. Atrophic right kidney. Small hiatal hernia.    BONES AND SOFT TISSUES: There are mild degenerative changes of the spine.  The soft tissues are unremarkable.    TUBES/LINES: None.    IMPRESSION:  Patchy left lower lobe opacity is decreased but not resolved compared to the prior study.        < end of copied text >

## 2020-09-04 NOTE — PROGRESS NOTE ADULT - ATTENDING COMMENTS
Patient seen and examined  Denies nausea or vomiting  Denies abdominal pain    Abd is soft, not tender and not distended  No rebound, no guarding    - no emergent surgical intervention required at this time  - follow up US
I Agus Alarcon MD have personally  seen and examined the patient today and have noted the findings and formulated the plan of care.  I Agus Alarcon MD have personally seen and examined the patient at bedside today at  4pm
S/P L renal artery stent with good patency post  Watch BP and creatinine closely  Only ~25-30 cc of contrast used    d/w Rosalie Alarcon and Misha Reddy
Patient seen and examined  Denies nausea or vomiting  Denies abdominal pain    Abd is soft, not tender and not distended  No rebound, no guarding    - no emergent surgical intervention required at this time  - follow up US
Renal function improving  S/P successful L renal artery stent placement  Continue antiplatelet with aspirin and plavix    need to check EF if we are giving pletal - get echo today    R groin is stable  Increase coreg to 12.5mg BID    If he does well, then anticipate d/c tomorrow    Discussed with Misha Rojo Rosca and patient's son in detail    Barry
Watch for symptoms of hypotension  Followup in the office in 2-3 weeks    Barry
Discussed with Dr. Cabral, Dr. Wiggins, patient and his son  Plan for renal artery stenosis intervention tomorrow with Dr. Jimenez  He may have breakfast, then NPO  Load Plavix 600mg today  stop aggrenox  Start aspirin 81mg daily     Start norvasc 5mg daily     Barry
Kelly Sy  Pager: [phone]. If no response or past 5 pm call [phone].     Please call ID service for questions over weekend at [phone].
I Agus Alarcon MD have personally  seen and examined the patient today and have noted the findings and formulated the plan of care.  I Agus Alarcon MD have personally seen and examined the patient at bedside today at  9am
I Agus Alarcon MD have personally  seen and examined the patient today and have noted the findings and formulated the plan of care.  I Agus Alarcon MD have personally seen and examined the patient at bedside today at  6pm
I Agus Alarcon MD have personally  seen and examined the patient today and have noted the findings and formulated the plan of care.  I Agus Alarcon MD have personally seen and examined the patient at bedside today at  7pm
I Agus Alarcon MD have seen and examined the patient today and agree with  the  evaluation, assessment and plan of the surgical house officer  ALTAF Alarcon MD have personally seen and examined the patient at bedside today at  10 am
I Agus Alarcon MD have seen and examined the patient today and agree with  the  evaluation, assessment and plan of the surgical house officer  ALTAF Alarcon MD have personally seen and examined the patient at bedside today at  8 am
LLL pneumonia with significant improvement on repeat ct chest. Off abx.  FU in office in 2 weeks.

## 2020-09-04 NOTE — PROGRESS NOTE ADULT - PROBLEM SELECTOR PROBLEM 2
Intermittent claudication due to atherosclerosis of artery of extremity
Left leg pain
Intermittent claudication due to atherosclerosis of artery of extremity
Intermittent claudication due to atherosclerosis of artery of extremity

## 2020-09-04 NOTE — PROGRESS NOTE ADULT - REASON FOR ADMISSION
weakness and fever
Weakness
Weakness and fever

## 2020-09-04 NOTE — DISCHARGE NOTE NURSING/CASE MANAGEMENT/SOCIAL WORK - PATIENT PORTAL LINK FT
You can access the FollowMyHealth Patient Portal offered by [facility] by registering at the following website: [email or web address]. By joining [employer]’s FollowMyHealth portal, you will also be able to view your health information using other applications (apps) compatible with our system.

## 2020-09-04 NOTE — PROGRESS NOTE ADULT - SUBJECTIVE AND OBJECTIVE BOX
Overnight events noted      VITAL:  T(C): , Max: 36.9 (09-03-20 @ 12:58)  T(F): , Max: 98.5 (09-03-20 @ 12:58)  HR: 85 (09-04-20 @ 06:29)  BP: 136/78 (09-04-20 @ 06:29)  BP(mean): --  RR: 18 (09-04-20 @ 06:29)  SpO2: 94% (09-04-20 @ 06:29)      PHYSICAL EXAM:  Constitutional: NAD, Alert  HEENT: NCAT, DMM  Neck: Supple, No JVD  Respiratory: CTA-b/l  Cardiovascular: RRR s1s2, no m/r/g  Gastrointestinal: BS+, soft, NT/ND  Extremities: No peripheral edema b/l  Neurological: no focal deficits; strength grossly intact  Back: no CVAT b/l  Skin: No rashes, no nevi    LABS:                        8.9    5.89  )-----------( 259      ( 04 Sep 2020 07:15 )             28.2     Na(142)/K(4.6)/Cl(109)/HCO3(21)/BUN(46)/Cr(2.27)Glu(132)/Ca(8.8)/Mg(--)/PO4(--)    09-04 @ 07:14  Na(140)/K(4.4)/Cl(108)/HCO3(21)/BUN(46)/Cr(2.09)Glu(134)/Ca(8.6)/Mg(1.9)/PO4(3.8)    09-03 @ 06:37  Na(143)/K(4.4)/Cl(110)/HCO3(22)/BUN(52)/Cr(2.29)Glu(135)/Ca(8.0)/Mg(--)/PO4(--)    09-02 @ 08:01      IMPRESSION: 80M w/ HTN, gout, PAD, and prostate CA s/p resection, 8/26/20 a/w PNA    (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 Atrophic right kidney; due to renal artery stenosis.    (2)JUAN RAMON -  hemodynamic/USHA - now resolved    (3)Vasc - severe left renal artery stenosis; s/p L-PTRA 9/2/20    (4)CV - BP highly acceptable s/p increase in Coreg dosage yesterday. on Norvasc as well.       RECOMMEND:  (1)Antihypertensives as ordered  (2)No renal objection to discharge; would have him f/u at my office in 1-2 weeks              Edis Cabral MD  [facility]  Office: [phone]  Cell: [phone] No pain, no SOB      VITAL:  T(C): , Max: 36.9 (09-03-20 @ 12:58)  T(F): , Max: 98.5 (09-03-20 @ 12:58)  HR: 85 (09-04-20 @ 06:29)  BP: 136/78 (09-04-20 @ 06:29)  RR: 18 (09-04-20 @ 06:29)  SpO2: 94% (09-04-20 @ 06:29)      PHYSICAL EXAM:  Constitutional: NAD, Alert  HEENT: NCAT, DMM  Neck: Supple, No JVD  Respiratory: CTA-b/l  Cardiovascular: RRR s1s2, no m/r/g  Gastrointestinal: BS+, soft, NT/ND  Extremities: No peripheral edema b/l  Neurological: no focal deficits; strength grossly intact  Back: no CVAT b/l  Skin: No rashes, no nevi    LABS:                        8.9    5.89  )-----------( 259      ( 04 Sep 2020 07:15 )             28.2     Na(142)/K(4.6)/Cl(109)/HCO3(21)/BUN(46)/Cr(2.27)Glu(132)/Ca(8.8)/Mg(--)/PO4(--)    09-04 @ 07:14  Na(140)/K(4.4)/Cl(108)/HCO3(21)/BUN(46)/Cr(2.09)Glu(134)/Ca(8.6)/Mg(1.9)/PO4(3.8)    09-03 @ 06:37  Na(143)/K(4.4)/Cl(110)/HCO3(22)/BUN(52)/Cr(2.29)Glu(135)/Ca(8.0)/Mg(--)/PO4(--)    09-02 @ 08:01      IMPRESSION: 80M w/ HTN, gout, PAD, and prostate CA s/p resection, 8/26/20 a/w PNA    (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 Atrophic right kidney; due to renal artery stenosis.    (2)JUAN RAMON -  hemodynamic/USHA - now resolved    (3)Vasc - severe left renal artery stenosis; s/p L-PTRA 9/2/20    (4)CV - BP highly acceptable s/p increase in Coreg dosage yesterday. on Norvasc as well.       RECOMMEND:  (1)Antihypertensives as ordered  (2)No renal objection to discharge; would have him f/u at my office in 1-2 weeks              Edis Cabral MD  [facility]  Office: [phone]  Cell: [phone]

## 2020-09-04 NOTE — PROGRESS NOTE ADULT - PROBLEM SELECTOR PLAN 2
ALTAF Alarcon MD have personally  seen and examined the patient today and have noted the findings and formulated the plan of care.
ALTAF Alarcon MD have personally  seen and examined the patient today and have noted the findings and formulated the plan of care.
-Improving  -Vascular f/u.
-Vascular f/u.
ALTAF Alarcon MD have personally  seen and examined the patient today and have noted the findings and formulated the plan of care.
ALTAF Alarcon MD have personally  seen and examined the patient today and have noted the findings and formulated the plan of care.
I Agus Alarcon MD have seen and examined the patient today and agree with  the  evaluation, assessment and plan of the surgical house officer
I Agus Alarcon MD have seen and examined the patient today and agree with  the  evaluation, assessment and plan of the surgical house officer

## 2020-09-04 NOTE — PROGRESS NOTE ADULT - SUBJECTIVE AND OBJECTIVE BOX
INTERVAL HPI/OVERNIGHT EVENTS: No new concerns. I feel great so want to go home.   Vital Signs Last 24 Hrs  T(C): 36.9 (04 Sep 2020 11:03), Max: 36.9 (04 Sep 2020 11:03)  T(F): 98.4 (04 Sep 2020 11:03), Max: 98.4 (04 Sep 2020 11:03)  HR: 82 (04 Sep 2020 11:03) (78 - 91)  BP: 159/72 (04 Sep 2020 11:03) (101/62 - 159/72)  BP(mean): --  RR: 18 (04 Sep 2020 11:03) (18 - 18)  SpO2: 99% (04 Sep 2020 11:03) (94% - 99%)  I&O's Summary    03 Sep 2020 07:01  -  04 Sep 2020 07:00  --------------------------------------------------------  IN: 1010 mL / OUT: 850 mL / NET: 160 mL    04 Sep 2020 07:01  -  04 Sep 2020 16:37  --------------------------------------------------------  IN: 360 mL / OUT: 0 mL / NET: 360 mL      MEDICATIONS  (STANDING):  amLODIPine   Tablet 5 milliGRAM(s) Oral daily  aspirin  chewable 81 milliGRAM(s) Oral daily  calcitriol   Capsule 0.25 MICROGram(s) Oral daily  carvedilol 12.5 milliGRAM(s) Oral every 12 hours  cilostazol 50 milliGRAM(s) Oral every 12 hours  clopidogrel Tablet 75 milliGRAM(s) Oral daily  colchicine 0.3 milliGRAM(s) Oral daily  heparin   Injectable 5000 Unit(s) SubCutaneous every 12 hours    MEDICATIONS  (PRN):  acetaminophen   Tablet .. 650 milliGRAM(s) Oral every 6 hours PRN Temp greater or equal to 38C (100.4F), Moderate Pain (4 - 6)    LABS:                        8.9    5.89  )-----------( 259      ( 04 Sep 2020 07:15 )             28.2     09-04    142  |  109<H>  |  46<H>  ----------------------------<  132<H>  4.6   |  21<L>  |  2.27<H>    Ca    8.8      04 Sep 2020 07:14  Phos  3.8     09-03  Mg     1.9     09-03    TPro  5.7<L>  /  Alb  3.5  /  TBili  0.4  /  DBili  x   /  AST  21  /  ALT  106<H>  /  AlkPhos  103  09-04        CAPILLARY BLOOD GLUCOSE              REVIEW OF SYSTEMS:  CONSTITUTIONAL: No fever, weight loss, or fatigue  EYES: No eye pain, visual disturbances, or discharge  ENMT:  No difficulty hearing, tinnitus, vertigo; No sinus or throat pain  NECK: No pain or stiffness  RESPIRATORY: No cough, wheezing, chills or hemoptysis; No shortness of breath  CARDIOVASCULAR: No chest pain, palpitations, dizziness, or leg swelling  GASTROINTESTINAL: No abdominal or epigastric pain. No nausea, vomiting, or hematemesis; No diarrhea or constipation. No melena or hematochezia.  GENITOURINARY: No dysuria, frequency, hematuria, or incontinence  NEUROLOGICAL: No headaches, memory loss, loss of strength, numbness, or tremors      Consultant(s) Notes Reviewed:  [x ] YES  [ ] NO    PHYSICAL EXAM:  GENERAL: NAD, well-groomed, well-developed,not in any distress ,  HEAD:  Atraumatic, Normocephalic  EYES: EOMI, PERRLA, conjunctiva and sclera clear  ENMT: No tonsillar erythema, exudates, or enlargement; Moist mucous membranes, Good dentition, No lesions  NECK: Supple, No JVD, Normal thyroid  NERVOUS SYSTEM:  Alert & Oriented X3, No focal deficit   CHEST/LUNG: Good air entry bilateral with no  rales, rhonchi, wheezing, or rubs  HEART: Regular rate and rhythm; No murmurs, rubs, or gallops  ABDOMEN: Soft, Nontender, Nondistended; Bowel sounds present  EXTREMITIES:  2+ Peripheral Pulses, No clubbing, cyanosis, or edema  SKIN: No rashes or lesions    Care Discussed with Consultants/Other Providers [ x] YES  [ ] NO

## 2020-09-04 NOTE — PROGRESS NOTE ADULT - PROBLEM SELECTOR PROBLEM 1
Arterial insufficiency of lower extremity
Pneumonia
Arterial insufficiency of lower extremity

## 2020-09-04 NOTE — PROGRESS NOTE ADULT - PROBLEM SELECTOR PLAN 1
ALTAF Alarcon MD have personally  seen and examined the patient today and have noted the findings and formulated the plan of care.
LLL consolidation on CT chest   -He is asymptomatic from a respiratory standpoint (no cough, dyspnea) but has clear infiltrate LLL that corresponded to LLL rales.  -S/p 7 days cefempine  -F/u CT chest 9/3 with improvement in LLL opacity   -No objections from pulmonary perspective for d/c planning.   -F/u in office
LLL consolidation on CT chest   -He is asymptomatic from a respiratory standpoint (no cough, dyspnea) but has clear infiltrate LLL that corresponds to LLL rales.  -Last fever 8/27  -BC NGTD  -Abx as per ID.
LLL consolidation on CT chest   -He is asymptomatic from a respiratory standpoint (no cough, dyspnea) but has clear infiltrate LLL that corresponds to LLL rales.  -Last fever 8/27  -BC NGTD  -Abx as per ID.  -Will consider repeat CT chest during this admission
LLL consolidation on CT chest   -He is asymptomatic from a respiratory standpoint (no cough, dyspnea) but has clear infiltrate LLL that corresponds to LLL rales.  -Last fever 8/27  -BC NGTD  -Abx as per ID.  -f/u CT chest non contrast ordered
LLL consolidation on CT chest   -He is asymptomatic from a respiratory standpoint (no cough, dyspnea) but has clear infiltrate LLL that corresponds to LLL rales.  -Tmax 102.8 F oral overnight  -BC NGTD  -Urine legionella, consider d/c azithromycin   -Abx as per ID.
I Agus Alarcon MD have seen and examined the patient today and agree with  the  evaluation, assessment and plan of the surgical house officer
I Agus Alarcon MD have seen and examined the patient today and agree with  the  evaluation, assessment and plan of the surgical house officer
ALTAF Alarcon MD have personally  seen and examined the patient today and have noted the findings and formulated the plan of care.

## 2020-09-23 ENCOUNTER — APPOINTMENT (OUTPATIENT)
Dept: CARDIOLOGY | Facility: CLINIC | Age: 80
End: 2020-09-23
Payer: MEDICARE

## 2020-09-23 VITALS
TEMPERATURE: 99.1 F | BODY MASS INDEX: 31.39 KG/M2 | OXYGEN SATURATION: 98 % | SYSTOLIC BLOOD PRESSURE: 147 MMHG | WEIGHT: 194.5 LBS | DIASTOLIC BLOOD PRESSURE: 71 MMHG | HEART RATE: 70 BPM

## 2020-09-23 PROCEDURE — 99214 OFFICE O/P EST MOD 30 MIN: CPT

## 2020-09-23 NOTE — PHYSICAL EXAM
[General Appearance - Well Developed] : well developed [Normal Appearance] : normal appearance [Well Groomed] : well groomed [General Appearance - Well Nourished] : well nourished [No Deformities] : no deformities [General Appearance - In No Acute Distress] : no acute distress [Normal Conjunctiva] : the conjunctiva exhibited no abnormalities [Eyelids - No Xanthelasma] : the eyelids demonstrated no xanthelasmas [Normal Oral Mucosa] : normal oral mucosa [No Oral Pallor] : no oral pallor [No Oral Cyanosis] : no oral cyanosis [Normal Jugular Venous A Waves Present] : normal jugular venous A waves present [Normal Jugular Venous V Waves Present] : normal jugular venous V waves present [No Jugular Venous Stearns A Waves] : no jugular venous stearns A waves [Respiration, Rhythm And Depth] : normal respiratory rhythm and effort [Exaggerated Use Of Accessory Muscles For Inspiration] : no accessory muscle use [Auscultation Breath Sounds / Voice Sounds] : lungs were clear to auscultation bilaterally [Heart Rate And Rhythm] : heart rate and rhythm were normal [Heart Sounds] : normal S1 and S2 [Murmurs] : no murmurs present [Abdomen Soft] : soft [Abdomen Tenderness] : non-tender [Abdomen Mass (___ Cm)] : no abdominal mass palpated [Abnormal Walk] : normal gait [Gait - Sufficient For Exercise Testing] : the gait was sufficient for exercise testing [Nail Clubbing] : no clubbing of the fingernails [Cyanosis, Localized] : no localized cyanosis [Petechial Hemorrhages (___cm)] : no petechial hemorrhages [Skin Color & Pigmentation] : normal skin color and pigmentation [] : no rash [No Venous Stasis] : no venous stasis [Skin Lesions] : no skin lesions [No Skin Ulcers] : no skin ulcer [No Xanthoma] : no  xanthoma was observed [Oriented To Time, Place, And Person] : oriented to person, place, and time [Affect] : the affect was normal [Mood] : the mood was normal [No Anxiety] : not feeling anxious [FreeTextEntry1] : trace edema.  nonpalp pedal pulses.  no ulcerations.

## 2020-09-23 NOTE — REASON FOR VISIT
[Follow-Up - From Hospitalization] : follow-up of a recent hospitalization for [FreeTextEntry1] : Here for followup .\par Had followed up with nephrology Dr. Cabral\par \par Medicaitons:\par Amlodipine 5mg\par Aspirin 81\par Calcitriol \par Coreg 12.5mg BID\par Ciolostazol 50mg dialy \par Plavix 75mg daily \par Colcholicne 0.6mg daily \par crestor 10mg daily \par Furosemide 40mg (started last week)\par \par He statest that he needs a new primary care doctor.  \par Breathing is ok\par \par He does have some calf cramping with ambulation.\par He does have some lower back pain\par He can walk 1-2 blocks.  He stops due to fatigue.\par He is making urine.  \par The urine is yellow in color.  \par \par He had his creatinine checked with nephrology - was told renal function is stable.  \par \par Creat: 9/14 :  2.35\par Bun 37\par Hemoglobin:  9.8

## 2020-09-23 NOTE — ASSESSMENT
[FreeTextEntry1] : Assessment:\par 1.  Renal artery stenosis\par -s/p LRA stent\par 2.  HTN\par 3.  CKD\par 4.  PAD\par \par Plan\par 1.  Continue current medications\par 2.  Stop Cilostazol.  Decrease colchicine to every other day \par 3.  Daily walking, healthy diet, exercise\par 4.  Monitor BP at home\par 5.  Return in 3 months.  \par 6.  establish care with PCP.

## 2020-09-23 NOTE — REVIEW OF SYSTEMS
[Leg Claudication] : intermittent leg claudication [Negative] : Heme/Lymph [Lower Ext Edema] : no extremity edema

## 2020-12-02 ENCOUNTER — APPOINTMENT (OUTPATIENT)
Dept: CARDIOLOGY | Facility: CLINIC | Age: 80
End: 2020-12-02
Payer: MEDICARE

## 2020-12-02 VITALS
DIASTOLIC BLOOD PRESSURE: 68 MMHG | SYSTOLIC BLOOD PRESSURE: 124 MMHG | TEMPERATURE: 98.7 F | WEIGHT: 192 LBS | BODY MASS INDEX: 30.99 KG/M2 | OXYGEN SATURATION: 99 % | HEART RATE: 62 BPM

## 2020-12-02 PROCEDURE — 99072 ADDL SUPL MATRL&STAF TM PHE: CPT

## 2020-12-02 PROCEDURE — 99214 OFFICE O/P EST MOD 30 MIN: CPT

## 2020-12-02 NOTE — ASSESSMENT
[FreeTextEntry1] : Assessment:\par 1.  Renal artery stenosis\par -s/p LRA stent\par 2.  HTN\par 3.  CKD\par 4.  PAD\par \par Plan\par 1.  Continue current medications\par 2. 3.  Daily walking, healthy diet, exercise\par 3.  LE arterial duplex to assess for PAD and claudication. \par 4.  Monitor BP at home\par 5.  Return in 3 months.  \par 6.  establish care with PCP\par 7.  Consider stopping aspirin next visit (continue plavix)\par 8.  Echo in Sept 2020 was normal.  He may proceed with cataract procedure without any additional testing.  Must continue aspirin and plavix.  \par

## 2020-12-02 NOTE — REASON FOR VISIT
[Follow-Up - Clinic] : a clinic follow-up of [FreeTextEntry1] : Here for followup  12/2/2020\par \par Had followed up with nephrology Dr. Cabral 2 weeks ago\par All good.  Had labwork done, states all is ok\par Needs tingling of the right leg, bottom of the foot\par Hard to move it  sometimes\par Not worse with walking\par he gets tired with walking.  The right is worse than the left.  \par \par Urinating no problems\par \par \par Medicaitons:\par Amlodipine 5mg\par Aspirin 81\par Calcitriol \par Coreg 12.5mg BID\par Plavix 75mg daily \par Colcholicne 0.6mg every other day\par crestor 10mg daily \par Furosemide 40mg every other day. \par \par

## 2020-12-02 NOTE — PHYSICAL EXAM
[General Appearance - Well Developed] : well developed [Normal Appearance] : normal appearance [Well Groomed] : well groomed [General Appearance - Well Nourished] : well nourished [No Deformities] : no deformities [General Appearance - In No Acute Distress] : no acute distress [Normal Conjunctiva] : the conjunctiva exhibited no abnormalities [Eyelids - No Xanthelasma] : the eyelids demonstrated no xanthelasmas [Normal Oral Mucosa] : normal oral mucosa [No Oral Pallor] : no oral pallor [No Oral Cyanosis] : no oral cyanosis [Normal Jugular Venous A Waves Present] : normal jugular venous A waves present [Normal Jugular Venous V Waves Present] : normal jugular venous V waves present [No Jugular Venous Stearns A Waves] : no jugular venous stearns A waves [Respiration, Rhythm And Depth] : normal respiratory rhythm and effort [Exaggerated Use Of Accessory Muscles For Inspiration] : no accessory muscle use [Auscultation Breath Sounds / Voice Sounds] : lungs were clear to auscultation bilaterally [Heart Rate And Rhythm] : heart rate and rhythm were normal [Heart Sounds] : normal S1 and S2 [Murmurs] : no murmurs present [Abdomen Soft] : soft [Abdomen Tenderness] : non-tender [Abdomen Mass (___ Cm)] : no abdominal mass palpated [Abnormal Walk] : normal gait [Gait - Sufficient For Exercise Testing] : the gait was sufficient for exercise testing [Nail Clubbing] : no clubbing of the fingernails [Cyanosis, Localized] : no localized cyanosis [Petechial Hemorrhages (___cm)] : no petechial hemorrhages [Skin Color & Pigmentation] : normal skin color and pigmentation [] : no rash [No Venous Stasis] : no venous stasis [Skin Lesions] : no skin lesions [No Skin Ulcers] : no skin ulcer [No Xanthoma] : no  xanthoma was observed [Oriented To Time, Place, And Person] : oriented to person, place, and time [Affect] : the affect was normal [Mood] : the mood was normal [No Anxiety] : not feeling anxious [FreeTextEntry1] : nonpalp pedal pulses.  no ulcerations.

## 2020-12-04 ENCOUNTER — NON-APPOINTMENT (OUTPATIENT)
Age: 80
End: 2020-12-04

## 2021-01-15 ENCOUNTER — APPOINTMENT (OUTPATIENT)
Dept: ULTRASOUND IMAGING | Facility: CLINIC | Age: 81
End: 2021-01-15
Payer: MEDICARE

## 2021-01-15 ENCOUNTER — OUTPATIENT (OUTPATIENT)
Dept: OUTPATIENT SERVICES | Facility: HOSPITAL | Age: 81
LOS: 1 days | End: 2021-01-15
Payer: MEDICARE

## 2021-01-15 DIAGNOSIS — Z98.890 OTHER SPECIFIED POSTPROCEDURAL STATES: Chronic | ICD-10-CM

## 2021-01-15 DIAGNOSIS — M54.16 RADICULOPATHY, LUMBAR REGION: ICD-10-CM

## 2021-01-15 DIAGNOSIS — Z90.79 ACQUIRED ABSENCE OF OTHER GENITAL ORGAN(S): Chronic | ICD-10-CM

## 2021-01-15 PROCEDURE — 93925 LOWER EXTREMITY STUDY: CPT

## 2021-01-15 PROCEDURE — 93925 LOWER EXTREMITY STUDY: CPT | Mod: 26

## 2021-01-22 ENCOUNTER — APPOINTMENT (OUTPATIENT)
Dept: CARDIOLOGY | Facility: CLINIC | Age: 81
End: 2021-01-22
Payer: MEDICARE

## 2021-01-22 ENCOUNTER — LABORATORY RESULT (OUTPATIENT)
Age: 81
End: 2021-01-22

## 2021-01-22 VITALS
HEIGHT: 66 IN | BODY MASS INDEX: 28.93 KG/M2 | HEART RATE: 63 BPM | OXYGEN SATURATION: 100 % | DIASTOLIC BLOOD PRESSURE: 75 MMHG | SYSTOLIC BLOOD PRESSURE: 148 MMHG | WEIGHT: 180 LBS

## 2021-01-22 PROCEDURE — 99215 OFFICE O/P EST HI 40 MIN: CPT

## 2021-01-22 PROCEDURE — 99072 ADDL SUPL MATRL&STAF TM PHE: CPT

## 2021-01-22 NOTE — REASON FOR VISIT
[Follow-Up - Clinic] : a clinic follow-up of [FreeTextEntry1] : 1/22/2021\par Reports bilateral LE tiredness on ambulation of 1 block.\par Relieved with rest.\par Denies or SOB.\par No C/P or SOB.\par Has eschar / lesion to R lower shin and L mid shin. Scabbed.\par \par Noted to have coronary calcifications in 3 vessels on prior CT chest.\par Denies prior cardiac angiography. No recent stress test.\par \par S/p L Renal Artery Stent 9/2/20. \par SBP improved from 180s to 140s. \par Repeat labs pending.\par Creatinine above 2 during last admission. \par \par Medications:\par Amlodipine 5 mg\par Aspirin 81\par Calcitriol \par Coreg 12.5 mg BID\par Plavix 75 mg daily \par Colchicine 0.6 mg every other day\par Crestor 10 mg daily \par Furosemide 40 mg every other day \par \par \par 1/15/2021\par Discussed arterial duplex results with patient, diffuse disease noted.\par [place] Class 3 claudication.\par Also reporting bilateral healed / healing sores.\par Plans for labs and office visit next week.\par Decision if to proceed with peripheral angiogram on office visit. \par Call office if any worsening symptoms occurring. \par \par Here for followup  12/2/2020\par \par Had followed up with nephrology Dr. Cabral 2 weeks ago\par All good.  Had labwork done, states all is ok\par Needs tingling of the right leg, bottom of the foot\par Hard to move it  sometimes\par Not worse with walking\par he gets tired with walking.  The right is worse than the left.  \par \par Urinating no problems\par \par \par \par

## 2021-01-22 NOTE — ASSESSMENT
[FreeTextEntry1] : Assessment:\par 1.  Renal artery stenosis\par -s/p L RA stent\par 2.  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  PAD\par 5. 3 vessel coronary calcifications on CT\par \par Plan\par 1.  Continue current medications.\par      ASA/Plavix/ Statin / BB / CCB / Diuretic\par 2. Medical management for PAD at this time.\par      No rest pain. Eschar to bilateral shins.\par 3. Daily feet monitoring and moisturizing.\par 4. Dermatology follow-up.\par 5. Nuclear Stress Test\par 6. Home BP monitoring.\par 7. Daily walking, healthy diet, exercise\par 8. Labs pending from this AM\par 9. Continue to follow with Renal, Dr. Edis Cabral.\par 10. Will call with test results.\par     Follow-up in 3 months\par

## 2021-01-22 NOTE — PHYSICAL EXAM
[General Appearance - Well Developed] : well developed [Normal Appearance] : normal appearance [Well Groomed] : well groomed [General Appearance - Well Nourished] : well nourished [No Deformities] : no deformities [General Appearance - In No Acute Distress] : no acute distress [Normal Conjunctiva] : the conjunctiva exhibited no abnormalities [Eyelids - No Xanthelasma] : the eyelids demonstrated no xanthelasmas [Normal Oral Mucosa] : normal oral mucosa [No Oral Pallor] : no oral pallor [No Oral Cyanosis] : no oral cyanosis [Normal Jugular Venous A Waves Present] : normal jugular venous A waves present [Normal Jugular Venous V Waves Present] : normal jugular venous V waves present [No Jugular Venous Stearns A Waves] : no jugular venous stearns A waves [Respiration, Rhythm And Depth] : normal respiratory rhythm and effort [Exaggerated Use Of Accessory Muscles For Inspiration] : no accessory muscle use [Auscultation Breath Sounds / Voice Sounds] : lungs were clear to auscultation bilaterally [Heart Rate And Rhythm] : heart rate and rhythm were normal [Heart Sounds] : normal S1 and S2 [Murmurs] : no murmurs present [Abdomen Soft] : soft [Abdomen Tenderness] : non-tender [Abnormal Walk] : normal gait [Abdomen Mass (___ Cm)] : no abdominal mass palpated [Cyanosis, Localized] : no localized cyanosis [] : no rash [No Venous Stasis] : no venous stasis [Oriented To Time, Place, And Person] : oriented to person, place, and time [Affect] : the affect was normal [Mood] : the mood was normal [No Anxiety] : not feeling anxious [FreeTextEntry1] : Audible Biphasic to Triphasic Doppler Pulses B/L, Has eschar / lesion to R lower shin and L mid shin. Scabbed.

## 2021-01-25 LAB
ALBUMIN SERPL ELPH-MCNC: 4.8 G/DL
ALP BLD-CCNC: 66 U/L
ALT SERPL-CCNC: 16 U/L
ANION GAP SERPL CALC-SCNC: 15 MMOL/L
AST SERPL-CCNC: 14 U/L
BASOPHILS # BLD AUTO: 0.07 K/UL
BASOPHILS NFR BLD AUTO: 1 %
BILIRUB SERPL-MCNC: 0.6 MG/DL
BUN SERPL-MCNC: 35 MG/DL
CALCIUM SERPL-MCNC: 9.2 MG/DL
CHLORIDE SERPL-SCNC: 104 MMOL/L
CO2 SERPL-SCNC: 23 MMOL/L
CREAT SERPL-MCNC: 1.87 MG/DL
EOSINOPHIL # BLD AUTO: 0.21 K/UL
EOSINOPHIL NFR BLD AUTO: 3 %
GLUCOSE SERPL-MCNC: 124 MG/DL
HCT VFR BLD CALC: 33.5 %
HGB BLD-MCNC: 11.1 G/DL
IMM GRANULOCYTES NFR BLD AUTO: 1.6 %
LYMPHOCYTES # BLD AUTO: 1.25 K/UL
LYMPHOCYTES NFR BLD AUTO: 17.9 %
MAN DIFF?: NORMAL
MCHC RBC-ENTMCNC: 33.1 GM/DL
MCHC RBC-ENTMCNC: 36.9 PG
MCV RBC AUTO: 111.3 FL
MONOCYTES # BLD AUTO: 0.59 K/UL
MONOCYTES NFR BLD AUTO: 8.5 %
NEUTROPHILS # BLD AUTO: 4.75 K/UL
NEUTROPHILS NFR BLD AUTO: 68 %
PLATELET # BLD AUTO: 203 K/UL
POTASSIUM SERPL-SCNC: 4.4 MMOL/L
PROT SERPL-MCNC: 7.1 G/DL
RBC # BLD: 3.01 M/UL
RBC # FLD: 16.3 %
SODIUM SERPL-SCNC: 142 MMOL/L
WBC # FLD AUTO: 6.98 K/UL

## 2021-01-30 ENCOUNTER — APPOINTMENT (OUTPATIENT)
Dept: DISASTER EMERGENCY | Facility: CLINIC | Age: 81
End: 2021-01-30

## 2021-01-31 LAB — SARS-COV-2 N GENE NPH QL NAA+PROBE: NOT DETECTED

## 2021-02-02 ENCOUNTER — OUTPATIENT (OUTPATIENT)
Dept: OUTPATIENT SERVICES | Facility: HOSPITAL | Age: 81
LOS: 1 days | End: 2021-02-02
Payer: MEDICARE

## 2021-02-02 ENCOUNTER — APPOINTMENT (OUTPATIENT)
Dept: CV DIAGNOSTICS | Facility: HOSPITAL | Age: 81
End: 2021-02-02

## 2021-02-02 DIAGNOSIS — Z98.890 OTHER SPECIFIED POSTPROCEDURAL STATES: Chronic | ICD-10-CM

## 2021-02-02 DIAGNOSIS — I25.10 ATHEROSCLEROTIC HEART DISEASE OF NATIVE CORONARY ARTERY WITHOUT ANGINA PECTORIS: ICD-10-CM

## 2021-02-02 DIAGNOSIS — Z90.79 ACQUIRED ABSENCE OF OTHER GENITAL ORGAN(S): Chronic | ICD-10-CM

## 2021-02-02 PROCEDURE — 78452 HT MUSCLE IMAGE SPECT MULT: CPT

## 2021-02-02 PROCEDURE — 93018 CV STRESS TEST I&R ONLY: CPT

## 2021-02-02 PROCEDURE — 78452 HT MUSCLE IMAGE SPECT MULT: CPT | Mod: 26

## 2021-02-02 PROCEDURE — 93016 CV STRESS TEST SUPVJ ONLY: CPT

## 2021-02-02 PROCEDURE — A9500: CPT

## 2021-02-02 PROCEDURE — 93017 CV STRESS TEST TRACING ONLY: CPT

## 2021-03-03 ENCOUNTER — APPOINTMENT (OUTPATIENT)
Dept: CARDIOLOGY | Facility: CLINIC | Age: 81
End: 2021-03-03
Payer: MEDICARE

## 2021-03-03 VITALS
DIASTOLIC BLOOD PRESSURE: 82 MMHG | TEMPERATURE: 97.8 F | HEIGHT: 66 IN | SYSTOLIC BLOOD PRESSURE: 162 MMHG | HEART RATE: 71 BPM | OXYGEN SATURATION: 98 %

## 2021-03-03 PROCEDURE — 99072 ADDL SUPL MATRL&STAF TM PHE: CPT

## 2021-03-03 PROCEDURE — 99215 OFFICE O/P EST HI 40 MIN: CPT

## 2021-03-03 NOTE — REASON FOR VISIT
[Follow-Up - Clinic] : a clinic follow-up of [FreeTextEntry1] : 3/3/2021\par \par Had abnormal nuclear stress\par Looks like 3VD on non-con CT chest\par Legs are bothering him with walking\par We tried to call his son Will during the visit. \par He is limited in his mobility by his legs.  Cannot elicit dyspnea because he is limited by his caludicaiton. \par \par Medications:\par Amlodipine 5 mg\par Aspirin 81\par Calcitriol \par Coreg 12.5 mg BID\par Plavix 75 mg daily \par Colchicine 0.6 mg daily for gout.  \par Crestor 10 mg daily \par Lasix 40mg every other day\par Metoprolol 12.5 daily \par \par \par BP at home is 160-170 at home. \par His BP here is 148/75\par \par He complains of leg pains, both, entire legs, with walking.  \par \par \par Furosemide 40 mg every other day \par \par \par 1/15/2021\par Discussed arterial duplex results with patient, diffuse disease noted.\par [place] Class 3 claudication.\par Also reporting bilateral healed / healing sores.\par Plans for labs and office visit next week.\par Decision if to proceed with peripheral angiogram on office visit. \par Call office if any worsening symptoms occurring. \par \par Here for followup  12/2/2020\par \par Had followed up with nephrology Dr. Cabral 2 weeks ago\par All good.  Had labwork done, states all is ok\par Needs tingling of the right leg, bottom of the foot\par Hard to move it  sometimes\par Not worse with walking\par he gets tired with walking.  The right is worse than the left.  \par \par Urinating no problems\par \par \par \par

## 2021-03-03 NOTE — PHYSICAL EXAM
[General Appearance - Well Developed] : well developed [Normal Appearance] : normal appearance [Well Groomed] : well groomed [General Appearance - Well Nourished] : well nourished [No Deformities] : no deformities [General Appearance - In No Acute Distress] : no acute distress [Normal Conjunctiva] : the conjunctiva exhibited no abnormalities [Eyelids - No Xanthelasma] : the eyelids demonstrated no xanthelasmas [Normal Oral Mucosa] : normal oral mucosa [No Oral Pallor] : no oral pallor [No Oral Cyanosis] : no oral cyanosis [Normal Jugular Venous A Waves Present] : normal jugular venous A waves present [Normal Jugular Venous V Waves Present] : normal jugular venous V waves present [No Jugular Venous Stearns A Waves] : no jugular venous stearns A waves [Respiration, Rhythm And Depth] : normal respiratory rhythm and effort [Exaggerated Use Of Accessory Muscles For Inspiration] : no accessory muscle use [Auscultation Breath Sounds / Voice Sounds] : lungs were clear to auscultation bilaterally [Heart Rate And Rhythm] : heart rate and rhythm were normal [Heart Sounds] : normal S1 and S2 [Murmurs] : no murmurs present [Abdomen Soft] : soft [Abdomen Tenderness] : non-tender [Abdomen Mass (___ Cm)] : no abdominal mass palpated [Abnormal Walk] : normal gait [Cyanosis, Localized] : no localized cyanosis [] : no rash [No Venous Stasis] : no venous stasis [Oriented To Time, Place, And Person] : oriented to person, place, and time [Affect] : the affect was normal [Mood] : the mood was normal [No Anxiety] : not feeling anxious [FreeTextEntry1] : Audible Biphasic to Triphasic Doppler Pulses B/L, Has eschar / lesion to R lower shin and L mid shin. Scabbed.

## 2021-03-03 NOTE — ASSESSMENT
[FreeTextEntry1] : Assessment:\par 1.  Renal artery stenosis\par -s/p L RA stent\par 2.  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  PAD\par 5. 3 vessel coronary calcifications on CT\par \par Plan\par 1.  He has what looks like 3V on non-con CT chest and ischemia on his stress test, will arrange for a left heart cath to assess for coronary dz\par 2.  Stop metoprolol, now sure why he is on carvedilol AND metoprolol\par 3.  Instead will increase his coreg to 25mg BID\par 4.  Continue aspirin and plavix and statin \par 5.  Hold lasix on the day of angiogram\par 6.  He will see his nephrologist Dr. Cabral next week for renal clearance for angio.  \par \par discussed with his daughter Alyssa in [place].  message left for his son Will\par Also discussed with Dr. Edis Cabral nephrology

## 2021-03-15 ENCOUNTER — APPOINTMENT (OUTPATIENT)
Dept: DISASTER EMERGENCY | Facility: CLINIC | Age: 81
End: 2021-03-15

## 2021-03-15 ENCOUNTER — NON-APPOINTMENT (OUTPATIENT)
Age: 81
End: 2021-03-15

## 2021-03-16 LAB — SARS-COV-2 N GENE NPH QL NAA+PROBE: NOT DETECTED

## 2021-03-18 ENCOUNTER — OUTPATIENT (OUTPATIENT)
Dept: OUTPATIENT SERVICES | Facility: HOSPITAL | Age: 81
LOS: 1 days | End: 2021-03-18
Payer: MEDICARE

## 2021-03-18 VITALS
WEIGHT: 184.97 LBS | RESPIRATION RATE: 16 BRPM | TEMPERATURE: 98 F | SYSTOLIC BLOOD PRESSURE: 160 MMHG | HEIGHT: 67 IN | OXYGEN SATURATION: 99 % | HEART RATE: 73 BPM | DIASTOLIC BLOOD PRESSURE: 74 MMHG

## 2021-03-18 VITALS
DIASTOLIC BLOOD PRESSURE: 78 MMHG | RESPIRATION RATE: 15 BRPM | SYSTOLIC BLOOD PRESSURE: 148 MMHG | HEART RATE: 65 BPM | OXYGEN SATURATION: 97 %

## 2021-03-18 DIAGNOSIS — Z98.49 CATARACT EXTRACTION STATUS, UNSPECIFIED EYE: Chronic | ICD-10-CM

## 2021-03-18 DIAGNOSIS — Z98.890 OTHER SPECIFIED POSTPROCEDURAL STATES: Chronic | ICD-10-CM

## 2021-03-18 DIAGNOSIS — Z86.79 PERSONAL HISTORY OF OTHER DISEASES OF THE CIRCULATORY SYSTEM: Chronic | ICD-10-CM

## 2021-03-18 DIAGNOSIS — I25.10 ATHEROSCLEROTIC HEART DISEASE OF NATIVE CORONARY ARTERY WITHOUT ANGINA PECTORIS: ICD-10-CM

## 2021-03-18 DIAGNOSIS — Z90.79 ACQUIRED ABSENCE OF OTHER GENITAL ORGAN(S): Chronic | ICD-10-CM

## 2021-03-18 LAB
ANION GAP SERPL CALC-SCNC: 14 MMOL/L — SIGNIFICANT CHANGE UP (ref 5–17)
BUN SERPL-MCNC: 31 MG/DL — HIGH (ref 7–23)
CALCIUM SERPL-MCNC: 9.1 MG/DL — SIGNIFICANT CHANGE UP (ref 8.4–10.5)
CHLORIDE SERPL-SCNC: 104 MMOL/L — SIGNIFICANT CHANGE UP (ref 96–108)
CO2 SERPL-SCNC: 19 MMOL/L — LOW (ref 22–31)
CREAT SERPL-MCNC: 2.12 MG/DL — HIGH (ref 0.5–1.3)
GLUCOSE SERPL-MCNC: 148 MG/DL — HIGH (ref 70–99)
HCT VFR BLD CALC: 31 % — LOW (ref 39–50)
HGB BLD-MCNC: 10.2 G/DL — LOW (ref 13–17)
MCHC RBC-ENTMCNC: 32.9 GM/DL — SIGNIFICANT CHANGE UP (ref 32–36)
MCHC RBC-ENTMCNC: 36.2 PG — HIGH (ref 27–34)
MCV RBC AUTO: 109.9 FL — HIGH (ref 80–100)
NRBC # BLD: 0 /100 WBCS — SIGNIFICANT CHANGE UP (ref 0–0)
PLATELET # BLD AUTO: 197 K/UL — SIGNIFICANT CHANGE UP (ref 150–400)
POTASSIUM SERPL-MCNC: 4.1 MMOL/L — SIGNIFICANT CHANGE UP (ref 3.5–5.3)
POTASSIUM SERPL-SCNC: 4.1 MMOL/L — SIGNIFICANT CHANGE UP (ref 3.5–5.3)
RBC # BLD: 2.82 M/UL — LOW (ref 4.2–5.8)
RBC # FLD: 16.8 % — HIGH (ref 10.3–14.5)
SODIUM SERPL-SCNC: 137 MMOL/L — SIGNIFICANT CHANGE UP (ref 135–145)
WBC # BLD: 5.93 K/UL — SIGNIFICANT CHANGE UP (ref 3.8–10.5)
WBC # FLD AUTO: 5.93 K/UL — SIGNIFICANT CHANGE UP (ref 3.8–10.5)

## 2021-03-18 PROCEDURE — C1769: CPT

## 2021-03-18 PROCEDURE — 85027 COMPLETE CBC AUTOMATED: CPT

## 2021-03-18 PROCEDURE — 93010 ELECTROCARDIOGRAM REPORT: CPT

## 2021-03-18 PROCEDURE — C1894: CPT

## 2021-03-18 PROCEDURE — 80048 BASIC METABOLIC PNL TOTAL CA: CPT

## 2021-03-18 PROCEDURE — 93454 CORONARY ARTERY ANGIO S&I: CPT

## 2021-03-18 PROCEDURE — C1887: CPT

## 2021-03-18 PROCEDURE — 93005 ELECTROCARDIOGRAM TRACING: CPT

## 2021-03-18 PROCEDURE — 93454 CORONARY ARTERY ANGIO S&I: CPT | Mod: 26

## 2021-03-18 RX ORDER — SODIUM CHLORIDE 9 MG/ML
1000 INJECTION INTRAMUSCULAR; INTRAVENOUS; SUBCUTANEOUS
Refills: 0 | Status: DISCONTINUED | OUTPATIENT
Start: 2021-03-18 | End: 2021-04-01

## 2021-03-18 RX ORDER — SODIUM CHLORIDE 9 MG/ML
3 INJECTION INTRAMUSCULAR; INTRAVENOUS; SUBCUTANEOUS EVERY 8 HOURS
Refills: 0 | Status: DISCONTINUED | OUTPATIENT
Start: 2021-03-18 | End: 2021-04-01

## 2021-03-18 NOTE — ASU DISCHARGE PLAN (ADULT/PEDIATRIC) - PROCEDURE
You had a diagnostic left heart catheterization via your right radial artery with your RCA having a chronic total occlusion that will be managed medically

## 2021-03-18 NOTE — H&P CARDIOLOGY - HISTORY OF PRESENT ILLNESS
81 y/o  male with pmh of HTN, HLD, CAD (on imaging), USHA s/p Left RA POBA/Stent on Asa/Plavix (9/2020) c/b CKD Stage 3-4 (EGFR 26-29), prostate CA , PAD noted on [facility] 8/2020 (mild Left fem-pop disease and more severe disease at the trifurcation and moderate right-sided femoral-popliteal disease. Moderate to severe right trifurcation), Lumbar radiculopathy followed by Dr. Gabriel Reddy, Cardiology with cardiac workup revealing abnormal CT of chest revealing 3V calcification on 9/2020 now s/p Pharmacological NST on 2/2/21 with no ischemic changes on ECG from baseline, VPDs during rest, stress and recovery and there are medium-sized, moderate to severe defects in the inferior, basal to mid inferolateral, and basal septal walls that are partially reversible suggestive of infarct with moderate richard-infarct ischemia with LVEF 62 %.  He presents today for evaluation of his CAD and denies cp, sob or palpitations, fever, chills, or sick contacts.  His COVID 19 PCR is negative on 3/15/21.  79 y/o  male with pmh of HTN, HLD, CAD (on imaging), Carotid Artery disease s/p Left CEA, USHA s/p Left RA POBA/Stent on Asa/Plavix (9/2020) c/b CKD Stage 3-4 (EGFR 26-29), prostate CA s/p Prostatectomy, PAD noted on [facility] 8/2020 (mild Left fem-pop disease and more severe disease at the trifurcation and moderate right-sided femoral-popliteal disease. Moderate to severe right trifurcation), Lumbar radiculopathy followed by Dr. Gabriel Reddy, Cardiology with cardiac workup revealing abnormal CT of chest revealing 3V calcification on 9/2020 now s/p Pharmacological NST on 2/2/21 with no ischemic changes on ECG from baseline, VPDs during rest, stress and recovery and there are medium-sized, moderate to severe defects in the inferior, basal to mid inferolateral, and basal septal walls that are partially reversible suggestive of infarct with moderate richard-infarct ischemia with LVEF 62 %.  He presents today for evaluation of his CAD and denies cp, sob or palpitations, fever, chills, or sick contacts.  His COVID 19 PCR is negative on 3/15/21. He denies implantable devices.      Renal - Dr. Cabral    Of Note: Pt will be hydrated pre and post cardiac cath will start hydration at 200cc/hr.

## 2021-03-18 NOTE — H&P CARDIOLOGY - PSH
H/O carotid endarterectomy    H/O prostatectomy     H/O carotid endarterectomy    H/O prostatectomy    H/O renal artery stenosis  s/p stent in Left RA  Status post cataract extraction, unspecified laterality

## 2021-03-18 NOTE — ASU DISCHARGE PLAN (ADULT/PEDIATRIC) - CARE PROVIDER_API CALL
Edis Becker)  Cardiology; Internal Medicine  [address] [facility] [address]  [place] [address]  Phone: [phone]  Fax: [phone]  Established Patient  Follow Up Time: 1-3 days

## 2021-03-18 NOTE — ASU DISCHARGE PLAN (ADULT/PEDIATRIC) - ASU DC SPECIAL INSTRUCTIONSFT
Please followup up with your  PCP/Cardiologist/Kidney doctor for repeat labs to check your kidney function and when to resume your Lasix.      Wound Care:   the day AFTER your procedure remove bandage GENTLY, and clean using  mild soap and gentle warm, water stream, pat dry. leave OPEN to air. YOU MAY SHOWER   DO NOT apply lotions, creams, ointments, powder, perfumes to your incision site  DO NOT SOAK your site for 1 week ( no baths, no pools, no tubs, etc...)  Check  your groin and /or wrist daily. A small amount of bruising, and soreness are normal    ACTIVITY: for 24 hours   - DO NOT DRIVE  - DO NOT make any important decisions or sign legal documents   - DO NOT operate heavy machineries   - you may resume sexual activity in 48 hours, unless otherwise instructed by your cardiologist     If your procedure was done through the WRIST: for the NEXT 3DAYS:  - avoid pushing, pulling, with that affected wrist   - avoid repeated movement of that hand and wrist ( eg: typing, hammering)  - DO NOT LIFT anything more than 5 lbs     If your procedure was done through the GROIN: for the NEXT 5 DAYS  - Limit climbing stairs, DO NOT soak in bathtub or pool  - no strenous activities, pushing, pulling, straining  - Do not lift anything 10 lbs or heavier     MEDICATION:   take your medications as explained (see discharge paperwork)   If you received a STENT, you will be taking antiplatelet medications to KEEP YOUR STENT OPEN (eg: Aspirin, Plavix, Brilinta, Effient, etc).  Take as prescribed DO NOT STOP taking them without consulting with your cardiologist first.     Follow heart healthy diet recommended by your doctor,  if you smoke STOP SMOKING (may call [phone] for center of tobacco control if you need assistance)     CALL your doctor to make appointment in 2 WEEKS     ***CALL YOUR DOCTOR***  if you experience: fever, chills, body aches, or severe pain, swelling, redness, heat or yellow discharge at incision site  If you experience Bleeding or excruciating pain at the procedural site, swelling (golf ball size) at your procedural site  If you experience CHEST PAIN  If you experience extremity numbness, tingling, temperature change (of your procedural site)   If you are unable to reach your doctor, you may contact:   -Cardiology Office at [facility] at [phone] or   - [facility] [phone]  - [phone]

## 2021-03-31 ENCOUNTER — APPOINTMENT (OUTPATIENT)
Dept: CARDIOLOGY | Facility: CLINIC | Age: 81
End: 2021-03-31
Payer: MEDICARE

## 2021-03-31 VITALS
SYSTOLIC BLOOD PRESSURE: 127 MMHG | HEART RATE: 71 BPM | TEMPERATURE: 97.3 F | DIASTOLIC BLOOD PRESSURE: 65 MMHG | WEIGHT: 197 LBS | HEIGHT: 66 IN | BODY MASS INDEX: 31.66 KG/M2 | OXYGEN SATURATION: 98 %

## 2021-03-31 PROCEDURE — 99072 ADDL SUPL MATRL&STAF TM PHE: CPT

## 2021-03-31 PROCEDURE — 99214 OFFICE O/P EST MOD 30 MIN: CPT

## 2021-03-31 NOTE — PHYSICAL EXAM
[General Appearance - Well Developed] : well developed [Normal Appearance] : normal appearance [Well Groomed] : well groomed [General Appearance - Well Nourished] : well nourished [No Deformities] : no deformities [General Appearance - In No Acute Distress] : no acute distress [Normal Conjunctiva] : the conjunctiva exhibited no abnormalities [Eyelids - No Xanthelasma] : the eyelids demonstrated no xanthelasmas [Normal Oral Mucosa] : normal oral mucosa [No Oral Pallor] : no oral pallor [No Oral Cyanosis] : no oral cyanosis [Normal Jugular Venous A Waves Present] : normal jugular venous A waves present [Normal Jugular Venous V Waves Present] : normal jugular venous V waves present [No Jugular Venous Stearns A Waves] : no jugular venous stearns A waves [Respiration, Rhythm And Depth] : normal respiratory rhythm and effort [Exaggerated Use Of Accessory Muscles For Inspiration] : no accessory muscle use [Auscultation Breath Sounds / Voice Sounds] : lungs were clear to auscultation bilaterally [Heart Rate And Rhythm] : heart rate and rhythm were normal [Heart Sounds] : normal S1 and S2 [Murmurs] : no murmurs present [Abdomen Soft] : soft [Abdomen Tenderness] : non-tender [Abdomen Mass (___ Cm)] : no abdominal mass palpated [Abnormal Walk] : normal gait [Cyanosis, Localized] : no localized cyanosis [FreeTextEntry1] : Audible Biphasic to Triphasic Doppler Pulses B/L, Has eschar / lesion to R lower shin and L mid shin. Scabbed. [] : no rash [No Venous Stasis] : no venous stasis [Oriented To Time, Place, And Person] : oriented to person, place, and time [Affect] : the affect was normal [Mood] : the mood was normal [No Anxiety] : not feeling anxious

## 2021-03-31 NOTE — REVIEW OF SYSTEMS
[Lower Ext Edema] : no extremity edema [Leg Claudication] : intermittent leg claudication [Negative] : Heme/Lymph

## 2021-03-31 NOTE — REASON FOR VISIT
[Follow-Up - Clinic] : a clinic follow-up of [FreeTextEntry1] : 3/31/2021\par \par He had R Radial cath\par  of the RCA\par otherwise 60% stenosis\par No chest pain \par no angina\par he has mild to mod claudcation both legs, no rest pain.  not really active.  Will medically managae\par \par Seen by Mohsen Cabral nephrology, all stable.  \par \par Medications:\par Amlodipine 5 mg\par Aspirin 81\par Calcitriol \par Coreg 2 5mg BID\par Plavix 75 mg daily \par Colchicine 0.6 mg daily for gout.  \par Crestor 10 mg daily \par Lasix 40mg every other day\par  \par \par \par BP at home is 160-170 at home. \par His BP here is 148/75\par \par He complains of leg pains, both, entire legs, with walking.  \par \par \par Furosemide 40 mg every other day \par \par \par 1/15/2021\par Discussed arterial duplex results with patient, diffuse disease noted.\par [place] Class 3 claudication.\par Also reporting bilateral healed / healing sores.\par Plans for labs and office visit next week.\par Decision if to proceed with peripheral angiogram on office visit. \par Call office if any worsening symptoms occurring. \par \par Here for followup  12/2/2020\par \par Had followed up with nephrology Dr. Cabral 2 weeks ago\par All good.  Had labwork done, states all is ok\par Needs tingling of the right leg, bottom of the foot\par Hard to move it  sometimes\par Not worse with walking\par he gets tired with walking.  The right is worse than the left.  \par \par Urinating no problems\par \par \par \par

## 2021-03-31 NOTE — ASSESSMENT
[FreeTextEntry1] : Assessment:\par 1.  Renal artery stenosis\par -s/p L RA stent\par 2.  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  PAD\par 5. 3 vessel coronary calcifications on CT\par s/p CATH March 18th:  m LAD 60%, OM2 60%, RCA \par \par Plan\par 1.BP and HR well controlled, he has no coronary symptoms at the present moment\par 2.  Continue current meds, DAPT, BB, statin therapy\par 3.  increase his activity, daily walking, daily foot checks\par 4.  Must lose some weight\par 5.  Establish care with PCP, dr. Reddy\par 6.  RTC in 3 months\par 7.  Discussed red flags of angina, cp, pressure, which would warrant urgent attention\par

## 2021-05-03 ENCOUNTER — LABORATORY RESULT (OUTPATIENT)
Age: 81
End: 2021-05-03

## 2021-05-03 ENCOUNTER — NON-APPOINTMENT (OUTPATIENT)
Age: 81
End: 2021-05-03

## 2021-05-03 ENCOUNTER — APPOINTMENT (OUTPATIENT)
Dept: INTERNAL MEDICINE | Facility: CLINIC | Age: 81
End: 2021-05-03
Payer: MEDICARE

## 2021-05-03 VITALS
TEMPERATURE: 97.4 F | HEART RATE: 76 BPM | WEIGHT: 198 LBS | OXYGEN SATURATION: 98 % | SYSTOLIC BLOOD PRESSURE: 136 MMHG | HEIGHT: 63 IN | BODY MASS INDEX: 35.08 KG/M2 | DIASTOLIC BLOOD PRESSURE: 66 MMHG

## 2021-05-03 VITALS — DIASTOLIC BLOOD PRESSURE: 66 MMHG | SYSTOLIC BLOOD PRESSURE: 134 MMHG

## 2021-05-03 DIAGNOSIS — K76.0 FATTY (CHANGE OF) LIVER, NOT ELSEWHERE CLASSIFIED: ICD-10-CM

## 2021-05-03 DIAGNOSIS — Z85.46 PERSONAL HISTORY OF MALIGNANT NEOPLASM OF PROSTATE: ICD-10-CM

## 2021-05-03 DIAGNOSIS — Z01.818 ENCOUNTER FOR OTHER PREPROCEDURAL EXAMINATION: ICD-10-CM

## 2021-05-03 PROCEDURE — 36415 COLL VENOUS BLD VENIPUNCTURE: CPT

## 2021-05-03 PROCEDURE — 99072 ADDL SUPL MATRL&STAF TM PHE: CPT

## 2021-05-03 PROCEDURE — 99204 OFFICE O/P NEW MOD 45 MIN: CPT | Mod: 25

## 2021-05-03 PROCEDURE — 93000 ELECTROCARDIOGRAM COMPLETE: CPT

## 2021-05-03 RX ORDER — AMLODIPINE BESYLATE 5 MG/1
5 TABLET ORAL DAILY
Qty: 30 | Refills: 1 | Status: ACTIVE | COMMUNITY
Start: 2021-05-03

## 2021-05-07 ENCOUNTER — APPOINTMENT (OUTPATIENT)
Dept: ULTRASOUND IMAGING | Facility: CLINIC | Age: 81
End: 2021-05-07
Payer: MEDICARE

## 2021-05-07 ENCOUNTER — OUTPATIENT (OUTPATIENT)
Dept: OUTPATIENT SERVICES | Facility: HOSPITAL | Age: 81
LOS: 1 days | End: 2021-05-07
Payer: MEDICARE

## 2021-05-07 DIAGNOSIS — Z86.79 PERSONAL HISTORY OF OTHER DISEASES OF THE CIRCULATORY SYSTEM: Chronic | ICD-10-CM

## 2021-05-07 DIAGNOSIS — Z98.49 CATARACT EXTRACTION STATUS, UNSPECIFIED EYE: Chronic | ICD-10-CM

## 2021-05-07 DIAGNOSIS — Z90.79 ACQUIRED ABSENCE OF OTHER GENITAL ORGAN(S): Chronic | ICD-10-CM

## 2021-05-07 DIAGNOSIS — Z98.890 OTHER SPECIFIED POSTPROCEDURAL STATES: Chronic | ICD-10-CM

## 2021-05-07 DIAGNOSIS — I25.10 ATHEROSCLEROTIC HEART DISEASE OF NATIVE CORONARY ARTERY WITHOUT ANGINA PECTORIS: ICD-10-CM

## 2021-05-07 DIAGNOSIS — N18.32 CHRONIC KIDNEY DISEASE, STAGE 3B: ICD-10-CM

## 2021-05-07 PROCEDURE — 93880 EXTRACRANIAL BILAT STUDY: CPT

## 2021-05-07 PROCEDURE — 93880 EXTRACRANIAL BILAT STUDY: CPT | Mod: 26

## 2021-05-27 ENCOUNTER — RX RENEWAL (OUTPATIENT)
Age: 81
End: 2021-05-27

## 2021-05-28 LAB
25(OH)D3 SERPL-MCNC: 34 NG/ML
ALBUMIN MFR SERPL ELPH: 63.4 %
ALBUMIN SERPL ELPH-MCNC: 4.7 G/DL
ALBUMIN SERPL-MCNC: 4.5 G/DL
ALBUMIN/GLOB SERPL: 1.7 RATIO
ALP BLD-CCNC: 63 U/L
ALPHA1 GLOB MFR SERPL ELPH: 4.8 %
ALPHA1 GLOB SERPL ELPH-MCNC: 0.3 G/DL
ALPHA2 GLOB MFR SERPL ELPH: 8.2 %
ALPHA2 GLOB SERPL ELPH-MCNC: 0.6 G/DL
ALT SERPL-CCNC: 15 U/L
ANION GAP SERPL CALC-SCNC: 14 MMOL/L
APPEARANCE: CLEAR
AST SERPL-CCNC: 16 U/L
B-GLOBULIN MFR SERPL ELPH: 10.5 %
B-GLOBULIN SERPL ELPH-MCNC: 0.7 G/DL
BACTERIA: NEGATIVE
BASOPHILS # BLD AUTO: 0.05 K/UL
BASOPHILS NFR BLD AUTO: 0.9 %
BILIRUB SERPL-MCNC: 0.6 MG/DL
BILIRUBIN URINE: NEGATIVE
BLOOD URINE: NEGATIVE
BUN SERPL-MCNC: 37 MG/DL
CALCIUM SERPL-MCNC: 9.1 MG/DL
CALCIUM SERPL-MCNC: 9.2 MG/DL
CHLORIDE SERPL-SCNC: 109 MMOL/L
CHOLEST SERPL-MCNC: 105 MG/DL
CO2 SERPL-SCNC: 20 MMOL/L
COLOR: NORMAL
CREAT SERPL-MCNC: 1.96 MG/DL
EOSINOPHIL # BLD AUTO: 0.18 K/UL
EOSINOPHIL NFR BLD AUTO: 3.5 %
ESTIMATED AVERAGE GLUCOSE: 114 MG/DL
FERRITIN SERPL-MCNC: 139 NG/ML
FOLATE SERPL-MCNC: 18.3 NG/ML
GAMMA GLOB FLD ELPH-MCNC: 0.9 G/DL
GAMMA GLOB MFR SERPL ELPH: 13.1 %
GLUCOSE QUALITATIVE U: NEGATIVE
GLUCOSE SERPL-MCNC: 129 MG/DL
HBA1C MFR BLD HPLC: 5.6 %
HCT VFR BLD CALC: 32.9 %
HDLC SERPL-MCNC: 35 MG/DL
HGB BLD-MCNC: 10.8 G/DL
HYALINE CASTS: 0 /LPF
INTERPRETATION SERPL IEP-IMP: NORMAL
KAPPA TOTAL LIGHT CHAIN, URINE: 3.41 MG/DL
KAPPA/LAMBDA TOTAL LIGHT CHAIN RATIO, URINE: 2.49
KETONES URINE: NEGATIVE
LAMBDA TOTAL LIGHT CHAIN, URINE: 1.37 MG/DL
LDLC SERPL CALC-MCNC: 39 MG/DL
LEUKOCYTE ESTERASE URINE: NEGATIVE
LYMPHOCYTES # BLD AUTO: 1.06 K/UL
LYMPHOCYTES NFR BLD AUTO: 20.2 %
M PROTEIN MFR SERPL ELPH: NORMAL
MAN DIFF?: NORMAL
MCHC RBC-ENTMCNC: 32.8 GM/DL
MCHC RBC-ENTMCNC: 37.4 PG
MCV RBC AUTO: 113.8 FL
MICROSCOPIC-UA: NORMAL
MONOCLON BAND OBS SERPL: NORMAL
MONOCYTES # BLD AUTO: 0.55 K/UL
MONOCYTES NFR BLD AUTO: 10.5 %
NEUTROPHILS # BLD AUTO: 3.28 K/UL
NEUTROPHILS NFR BLD AUTO: 62.3 %
NITRITE URINE: NEGATIVE
NONHDLC SERPL-MCNC: 70 MG/DL
NT-PROBNP SERPL-MCNC: 424 PG/ML
PARATHYROID HORMONE INTACT: 42 PG/ML
PH URINE: 6
PLATELET # BLD AUTO: 187 K/UL
POTASSIUM SERPL-SCNC: 4.4 MMOL/L
PROT SERPL-MCNC: 7 G/DL
PROT SERPL-MCNC: 7.1 G/DL
PROT SERPL-MCNC: 7.1 G/DL
PROTEIN URINE: ABNORMAL
PSA SERPL-MCNC: <0.01 NG/ML
RBC # BLD: 2.89 M/UL
RBC # FLD: 18.1 %
RED BLOOD CELLS URINE: 1 /HPF
SODIUM SERPL-SCNC: 142 MMOL/L
SPECIFIC GRAVITY URINE: 1.01
SQUAMOUS EPITHELIAL CELLS: 0 /HPF
TRIGL SERPL-MCNC: 157 MG/DL
TSH SERPL-ACNC: 2.82 UIU/ML
URATE SERPL-MCNC: 8 MG/DL
UROBILINOGEN URINE: NORMAL
VIT B12 SERPL-MCNC: 539 PG/ML
WBC # FLD AUTO: 5.26 K/UL
WHITE BLOOD CELLS URINE: 0 /HPF

## 2021-06-08 ENCOUNTER — LABORATORY RESULT (OUTPATIENT)
Age: 81
End: 2021-06-08

## 2021-06-08 ENCOUNTER — APPOINTMENT (OUTPATIENT)
Dept: INTERNAL MEDICINE | Facility: CLINIC | Age: 81
End: 2021-06-08
Payer: MEDICARE

## 2021-06-08 VITALS
SYSTOLIC BLOOD PRESSURE: 120 MMHG | BODY MASS INDEX: 34.38 KG/M2 | HEIGHT: 63 IN | TEMPERATURE: 97.3 F | WEIGHT: 194 LBS | HEART RATE: 73 BPM | OXYGEN SATURATION: 96 % | DIASTOLIC BLOOD PRESSURE: 65 MMHG

## 2021-06-08 PROCEDURE — 99214 OFFICE O/P EST MOD 30 MIN: CPT | Mod: 25

## 2021-06-08 PROCEDURE — 36415 COLL VENOUS BLD VENIPUNCTURE: CPT

## 2021-06-08 PROCEDURE — 99072 ADDL SUPL MATRL&STAF TM PHE: CPT

## 2021-06-08 NOTE — HISTORY OF PRESENT ILLNESS
[FreeTextEntry1] : \par \par Follow-up for multiple medical reasons.\par \par  [de-identified] : Patient is a 80-year-old male with history of coronary arteries, hypertension, obesity, peripheral artery disease, chronic kidney disease IIIB, anemia, lumbar radiculopathy history of stroke/TIA, stasis dermatitis with edema who presents for follow-up patient feels well complains of dark-colored stools no diarrhea abdominal pain nausea vomiting chest pain palpitations. Patient feels otherwise well. Patient only able to walk half a block secondary to leg pain

## 2021-06-08 NOTE — HISTORY OF PRESENT ILLNESS
[FreeTextEntry1] : Establishment in care and follow-up for multiple medical issues [de-identified] : \par \par The patient is a 80-year-old male with history of coronary artery disease, peripheral vascular disease, right renal artery stenosis, chronic renal insufficiency stage III/4, macrocytic anemia, fatty liver disease, atrophic right kidney, gout, hypertension, hyperlipidemia, who presents for establishment of care. Patient feels well denies chest pain, shortness of breath distant exertion recent Gout flares has use colchicine noncompliant with allopurinol.

## 2021-06-08 NOTE — HEALTH RISK ASSESSMENT
[Fair] :  ~his/her~ mood as fair [No] : No [No falls in past year] : Patient reported no falls in the past year [0] : 2) Feeling down, depressed, or hopeless: Not at all (0) [HIV test declined] : HIV test declined [Hepatitis C test declined] : Hepatitis C test declined [None] : None [Alone] : lives alone [Retired] : retired [] :  [# Of Children ___] : has [unfilled] children [Feels Safe at Home] : Feels safe at home [Fully functional (bathing, dressing, toileting, transferring, walking, feeding)] : Fully functional (bathing, dressing, toileting, transferring, walking, feeding) [Fully functional (using the telephone, shopping, preparing meals, housekeeping, doing laundry, using] : Fully functional and needs no help or supervision to perform IADLs (using the telephone, shopping, preparing meals, housekeeping, doing laundry, using transportation, managing medications and managing finances) [Reports changes in vision] : Reports changes in vision [Smoke Detector] : smoke detector [Carbon Monoxide Detector] : carbon monoxide detector [Safety elements used in home] : safety elements used in home [Seat Belt] :  uses seat belt [Sunscreen] : uses sunscreen [] : No [Audit-CScore] : 0 [de-identified] : Occasionally walks [de-identified] : Fairly healthy [[id number]] : o [Change in mental status noted] : No change in mental status noted [Language] : denies difficulty with language [Behavior] : denies difficulty with behavior [Handling Complex Tasks] : denies difficulty handling complex tasks [Reasoning] : denies difficulty with reasoning [Spatial Ability and Orientation] : denies difficulty with spatial ability and orientation [Sexually Active] : not sexually active [High Risk Behavior] : no high risk behavior [Reports changes in hearing] : Reports no changes in hearing [Reports normal functional visual acuity (ie: able to read med bottle)] : Reports poor functional visual acuity.  [Reports changes in dental health] : Reports no changes in dental health [Guns at Home] : no guns at home [Travel to Developing Areas] : does not  travel to developing areas [TB Exposure] : is not being exposed to tuberculosis [Caregiver Concerns] : does not have caregiver concerns

## 2021-06-08 NOTE — REVIEW OF SYSTEMS
[Frequency] : frequency [Negative] : Gastrointestinal [Dysuria] : no dysuria [Incontinence] : no incontinence [Hesitancy] : no hesitancy [Nocturia] : no nocturia [Hematuria] : no hematuria [Impotence] : no impotency [Poor Libido] : libido not poor

## 2021-06-08 NOTE — ASSESSMENT
[FreeTextEntry1] : Patient is a 80-year-old male with history of coronary disease, renal artery stenosis status post stent, peripheral vascular disease, chronic kidney disease, prostate cancer by history status post prostatectomy, gout, hypertension, hyperlipidemia, fatty liver disease, obesity, atrophic right kidney, who presents for establishment of care and follow-up for multiple medical issues\par \par 1 coronary disease\par continue aggressive cholesterol-lowering and Plavix aspirin carvedilol\par follow-up with cardiology\par \par 2. Chronic renal disease\par check UA, CBC, kidney function test, urine from light change, serum electron protein paresis\par follow-up with renal\par \par 3  macrocytic anemia\par check CBC folate and vitamin B12 ferritin\par consider referral to hematology\par \par 4. Gout\par check BUN creatinine Watkins colchicine and albuterol\par \par 5. Peripheral vascular disease\par continue aggressive cholesterol-lowering never took parental\par follow-up with vascular check carotid Doppler's.\par \par 6. History of prostate cancer\par check PSA\par \par 7. Urinary frequency\par check electrolytes UA and PSA\par \par 8 stasis dermatitis\par elevation of legs unable to use elastic stockings secondary peripheral vascular disease\par trial Lamisil cream on the feet\par consider lack hydration mild steroid cream next visit\par \par 9. Hyperlipidemia\par continue Crestor 10 check lipid profile consider more aggressive treatment\par \par 10 hypertension\par well controlled with amlodipine five\par \par  11 health maintenance\par will update the future\par suggested covert vaccine\par follow-up in one month\par \par 12 history of fatty liver by sonogram\par check LFTs.

## 2021-06-08 NOTE — CURRENT MEDS
[Lack of understanding] : lack of understanding [No] : Did not review medication list for presence of high-risk medications. [Takes medication as prescribed] : does not take

## 2021-06-08 NOTE — REVIEW OF SYSTEMS
[Claudication] : leg claudication [Lower Ext Edema] : lower extremity edema [Melena] : meltaryn [Muscle Pain] : muscle pain [Negative] : Integumentary [Chest Pain] : no chest pain [Palpitations] : no palpitations [Orthopena] : no orthopnea [Paroxysmal Nocturnal Dyspnea] : no paroxysmal nocturnal dyspnea [Abdominal Pain] : no abdominal pain [Constipation] : no constipation [Diarrhea] : no diarrhea [Vomiting] : no vomiting [Heartburn] : no heartburn [Joint Pain] : no joint pain [Joint Stiffness] : no joint stiffness [Muscle Weakness] : no muscle weakness [Back Pain] : no back pain [Joint Swelling] : no joint swelling

## 2021-06-08 NOTE — ASSESSMENT
[FreeTextEntry1] : Patient is a 80-year-old male with history of obesity, coronary disease, peripheral artery disease, carotid artery disease, hyperlipidemia, hypertension, stasis dermatitis, gout, history of strokes who presents for follow-up\par \par 1. Stage IV kidney disease\par follow-up with renal Dr. Edis Cabral\par monitor closely we check BUN creatinine\par \par 2 anemia\par most likely secondary to chronic kidney disease\par \par 3 dark stools\par guaiac  negative\par check iron studies CBC\par \par 4 peripheral vascular coronary artery disease and carotid disease\par continue aspirin Plavix and aggressive cholesterol-lowering\par discuss with cardiology tomorrow\par \par 5 history of gout\par check uric acid\par \par 6 stasis dermatitis\par elevation\par \par 7 health maintenance\par patient states Mateo pneumococcal vaccine considering shingles vaccine\par

## 2021-06-08 NOTE — PHYSICAL EXAM
[Normal Sclera/Conjunctiva] : normal sclera/conjunctiva [Normal Outer Ear/Nose] : the outer ears and nose were normal in appearance [No JVD] : no jugular venous distention [Normal] : no CVA or spinal tenderness

## 2021-06-08 NOTE — PHYSICAL EXAM
[Normal Sclera/Conjunctiva] : normal sclera/conjunctiva [Normal Outer Ear/Nose] : the outer ears and nose were normal in appearance [Normal Rate] : normal rate  [Regular Rhythm] : with a regular rhythm [Normal S1, S2] : normal S1 and S2 [No Carotid Bruits] : no carotid bruits [No Abdominal Bruit] : a ~M bruit was not heard ~T in the abdomen [No Varicosities] : no varicosities [No Palpable Aorta] : no palpable aorta [No Extremity Clubbing/Cyanosis] : no extremity clubbing/cyanosis [Normal Appearance] : normal in appearance [No Masses] : no palpable masses [No Nipple Discharge] : no nipple discharge [No Axillary Lymphadenopathy] : no axillary lymphadenopathy [Normal] : affect was normal and insight and judgment were intact [de-identified] : 3/6 systolic murmur [de-identified] : bilateral edema decrease pedal pulses [de-identified] : stasis changes on legs rash on feet scaly

## 2021-06-16 ENCOUNTER — APPOINTMENT (OUTPATIENT)
Dept: CARDIOLOGY | Facility: CLINIC | Age: 81
End: 2021-06-16

## 2021-06-22 LAB
ANION GAP SERPL CALC-SCNC: 14 MMOL/L
BASOPHILS # BLD AUTO: 0.09 K/UL
BASOPHILS NFR BLD AUTO: 1.7 %
BUN SERPL-MCNC: 29 MG/DL
CALCIUM SERPL-MCNC: 8.7 MG/DL
CHLORIDE SERPL-SCNC: 104 MMOL/L
CO2 SERPL-SCNC: 22 MMOL/L
CREAT SERPL-MCNC: 2.39 MG/DL
EOSINOPHIL # BLD AUTO: 0.05 K/UL
EOSINOPHIL NFR BLD AUTO: 0.9 %
FERRITIN SERPL-MCNC: 109 NG/ML
GLUCOSE SERPL-MCNC: 185 MG/DL
HCT VFR BLD CALC: 31.5 %
HGB BLD-MCNC: 10.2 G/DL
IRON SATN MFR SERPL: 43 %
IRON SERPL-MCNC: 108 UG/DL
LYMPHOCYTES # BLD AUTO: 1.06 K/UL
LYMPHOCYTES NFR BLD AUTO: 19.1 %
MAN DIFF?: NORMAL
MCHC RBC-ENTMCNC: 32.4 GM/DL
MCHC RBC-ENTMCNC: 38.2 PG
MCV RBC AUTO: 118 FL
MONOCYTES # BLD AUTO: 0.24 K/UL
MONOCYTES NFR BLD AUTO: 4.4 %
NEUTROPHILS # BLD AUTO: 3.89 K/UL
NEUTROPHILS NFR BLD AUTO: 70.4 %
PLATELET # BLD AUTO: 163 K/UL
POTASSIUM SERPL-SCNC: 4.1 MMOL/L
RBC # BLD: 2.67 M/UL
RBC # BLD: 2.67 M/UL
RBC # FLD: 18 %
RETICS # AUTO: 3.3 %
RETICS AGGREG/RBC NFR: 87.6 K/UL
SODIUM SERPL-SCNC: 141 MMOL/L
TIBC SERPL-MCNC: 253 UG/DL
UIBC SERPL-MCNC: 145 UG/DL
URATE SERPL-MCNC: 9.3 MG/DL
WBC # FLD AUTO: 5.53 K/UL

## 2021-06-30 ENCOUNTER — APPOINTMENT (OUTPATIENT)
Dept: CARDIOLOGY | Facility: CLINIC | Age: 81
End: 2021-06-30
Payer: MEDICARE

## 2021-06-30 VITALS
DIASTOLIC BLOOD PRESSURE: 70 MMHG | HEIGHT: 63 IN | TEMPERATURE: 97.3 F | SYSTOLIC BLOOD PRESSURE: 132 MMHG | WEIGHT: 188 LBS | HEART RATE: 73 BPM | BODY MASS INDEX: 33.31 KG/M2 | OXYGEN SATURATION: 96 %

## 2021-06-30 PROCEDURE — 99215 OFFICE O/P EST HI 40 MIN: CPT

## 2021-06-30 PROCEDURE — 99072 ADDL SUPL MATRL&STAF TM PHE: CPT

## 2021-06-30 NOTE — REASON FOR VISIT
[Follow-Up - Clinic] : a clinic follow-up of [FreeTextEntry1] : 6/30/2021\par Complains of calf pain with walking\par R hip pains\par Pins and needs bottom of right foot\par most recent creatinine is 2.39\par He remains on cilostazol 50mg BID\par He remains on plavix\par BP is well controlled\par The left leg is not as bad as the right\par Complains of some ankle pains\par No open wounds or tissue loss on the feet. \par No chest pain \par no chest pressure.  \par \par 3/31/2021\par \par He had R Radial cath\par  of the RCA\par otherwise 60% stenosis\par No chest pain \par no angina\par he has mild to mod claudcation both legs, no rest pain.  not really active.  Will medically managae\par \par Seen by Mohsen Cabral nephrology, all stable.  \par \par Medications:\par Amlodipine 5 mg\par Aspirin 81\par Calcitriol \par Coreg 2 5mg BID\par Plavix 75 mg daily \par Colchicine 0.6 mg daily for gout.  \par Crestor 10 mg daily \par Lasix 40mg every other day\par  \par \par \par BP at home is 160-170 at home. \par His BP here is 148/75\par \par He complains of leg pains, both, entire legs, with walking.  \par \par \par Furosemide 40 mg every other day \par \par \par 1/15/2021\par Discussed arterial duplex results with patient, diffuse disease noted.\par [place] Class 3 claudication.\par Also reporting bilateral healed / healing sores.\par Plans for labs and office visit next week.\par Decision if to proceed with peripheral angiogram on office visit. \par Call office if any worsening symptoms occurring. \par \par Here for followup  12/2/2020\par \par Had followed up with nephrology Dr. Cabral 2 weeks ago\par All good.  Had labwork done, states all is ok\par Needs tingling of the right leg, bottom of the foot\par Hard to move it  sometimes\par Not worse with walking\par he gets tired with walking.  The right is worse than the left.  \par \par Urinating no problems\par \par \par \par

## 2021-06-30 NOTE — ASSESSMENT
[FreeTextEntry1] : Assessment:\par 1.  Renal artery stenosis\par -s/p L RA stent\par 2.  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  PAD\par 5. 3 vessel coronary calcifications on CT\par s/p CATH March 18th:  m LAD 60%, OM2 60%, RCA \par \par Plan\par 1. BP and HR well controlled, he has no coronary symptoms at the present moment\par 2.  Continue current meds, DAPT, BB, statin therapy\par 3.  increase his activity, daily walking, daily foot checks\par 4.  Must lose some weight\par 5.   For his PAD - will repeat arterial testing.  Increase cilostazol to 100mg BID, referral to supervised exercise therapy offered, however he does not want to do this.  I think that an intervention of the PAD will put him at risk for JUAN RAMON and favor conservative therapy for now.  \par 6.  repeat carotid duplex in 1 year.  Spring of 2022\par 7.  LDL is controlled \par 8.  Ortho referral - is there hip arthritis?  He states hes had injections in the past\par 9.  Return in 3 months.

## 2021-06-30 NOTE — PHYSICAL EXAM
[General Appearance - Well Developed] : well developed [Normal Appearance] : normal appearance [Well Groomed] : well groomed [General Appearance - Well Nourished] : well nourished [No Deformities] : no deformities [General Appearance - In No Acute Distress] : no acute distress [Normal Conjunctiva] : the conjunctiva exhibited no abnormalities [Eyelids - No Xanthelasma] : the eyelids demonstrated no xanthelasmas [Normal Oral Mucosa] : normal oral mucosa [No Oral Pallor] : no oral pallor [No Oral Cyanosis] : no oral cyanosis [Normal Jugular Venous A Waves Present] : normal jugular venous A waves present [Normal Jugular Venous V Waves Present] : normal jugular venous V waves present [No Jugular Venous Stearns A Waves] : no jugular venous stearns A waves [Respiration, Rhythm And Depth] : normal respiratory rhythm and effort [Exaggerated Use Of Accessory Muscles For Inspiration] : no accessory muscle use [Heart Rate And Rhythm] : heart rate and rhythm were normal [Auscultation Breath Sounds / Voice Sounds] : lungs were clear to auscultation bilaterally [Heart Sounds] : normal S1 and S2 [Murmurs] : no murmurs present [Abdomen Soft] : soft [Abdomen Tenderness] : non-tender [Abdomen Mass (___ Cm)] : no abdominal mass palpated [Abnormal Walk] : normal gait [Cyanosis, Localized] : no localized cyanosis [] : no rash [No Venous Stasis] : no venous stasis [Oriented To Time, Place, And Person] : oriented to person, place, and time [Affect] : the affect was normal [Mood] : the mood was normal [No Anxiety] : not feeling anxious [FreeTextEntry1] : Audible Biphasic to Triphasic Doppler Pulses B/L, Has eschar / lesion to R lower shin and L mid shin. Scabbed.

## 2021-07-07 ENCOUNTER — APPOINTMENT (OUTPATIENT)
Dept: ORTHOPEDIC SURGERY | Facility: CLINIC | Age: 81
End: 2021-07-07
Payer: MEDICARE

## 2021-07-07 VITALS
SYSTOLIC BLOOD PRESSURE: 138 MMHG | DIASTOLIC BLOOD PRESSURE: 66 MMHG | HEART RATE: 73 BPM | HEIGHT: 63 IN | BODY MASS INDEX: 33.31 KG/M2 | WEIGHT: 188 LBS

## 2021-07-07 DIAGNOSIS — M47.817 SPONDYLOSIS W/OUT MYELOPATHY OR RADICULOPATHY, LUMBOSACRAL REGION: ICD-10-CM

## 2021-07-07 PROCEDURE — 99214 OFFICE O/P EST MOD 30 MIN: CPT

## 2021-07-07 PROCEDURE — 99072 ADDL SUPL MATRL&STAF TM PHE: CPT

## 2021-07-07 PROCEDURE — 72100 X-RAY EXAM L-S SPINE 2/3 VWS: CPT

## 2021-07-07 NOTE — DISCUSSION/SUMMARY
[de-identified] : Given his worsening symptoms have failed to improve with physical therapy a lumbar spine MRI will be obtained.  Follow-up afterwards.

## 2021-07-07 NOTE — HISTORY OF PRESENT ILLNESS
[de-identified] : Mr. MASOOD MACARIO  is a 81 year old male who presents with a chronic history of low back pain and right leg tingling. Normal bowel and bladder control.   Denies any recent fevers, chills, sweats, weight loss, or infection.\par \par The patients past medical history, past surgical history, medications, allergies, and social history were reviewed by me today with the patient and documented accordingly.  In addition, the patient's family history, which is noncontributory to their visit, was also reviewed.\par

## 2021-07-07 NOTE — PHYSICAL EXAM
[Antalgic] : antalgic [Cane] : ambulates with cane [de-identified] : Examination of the lumbar spine reveals no midline tenderness palpation, step-offs, or skin lesions. Decreased range of motion with respect to flexion, extension, lateral bending, and rotation. No tenderness to palpation of the sciatic notch. No tenderness palpation of the bilateral greater trochanters. No pain with passive internal/external rotation of the hips. No instability of bilateral lower extremities.  Negative MICHAEL. Negative straight leg raise bilaterally. No bowstring. Negative femoral stretch. 5 out of 5 iliopsoas, hip abductors, hips adductors, quadriceps, hamstrings, gastrocsoleus, tibialis anterior, extensor hallucis longus, peroneals. Grossly intact sensation to light touch bilateral lower extremities. 1+ patellar and Achilles reflexes. Downgoing Babinski. No clonus. Intact proprioception. Palpable pulses. No skin lesion and no edema on the right and left lower extremities. [de-identified] : AP lateral lumbar x-rays reveals lumbar spondylosis and kyphosis.  He appears to have some blocked vertebrae as well

## 2021-07-20 ENCOUNTER — APPOINTMENT (OUTPATIENT)
Dept: INTERNAL MEDICINE | Facility: CLINIC | Age: 81
End: 2021-07-20
Payer: MEDICARE

## 2021-07-20 ENCOUNTER — LABORATORY RESULT (OUTPATIENT)
Age: 81
End: 2021-07-20

## 2021-07-20 VITALS
OXYGEN SATURATION: 99 % | DIASTOLIC BLOOD PRESSURE: 57 MMHG | WEIGHT: 200 LBS | HEIGHT: 63 IN | HEART RATE: 63 BPM | SYSTOLIC BLOOD PRESSURE: 117 MMHG | TEMPERATURE: 98 F | BODY MASS INDEX: 35.44 KG/M2

## 2021-07-20 DIAGNOSIS — Z23 ENCOUNTER FOR IMMUNIZATION: ICD-10-CM

## 2021-07-20 PROCEDURE — 99072 ADDL SUPL MATRL&STAF TM PHE: CPT

## 2021-07-20 PROCEDURE — 36415 COLL VENOUS BLD VENIPUNCTURE: CPT

## 2021-07-20 PROCEDURE — 99214 OFFICE O/P EST MOD 30 MIN: CPT | Mod: 25

## 2021-07-20 NOTE — HISTORY OF PRESENT ILLNESS
[FreeTextEntry1] : Follow-up for multiple medical issues [de-identified] : The patient is a 81-year-old male with history of hypertension, hyperlipidemia, obesity, fatty liver disease, coronary disease, peripheral vascular disease, stroke syndrome/TIA, gout, chronic kidney disease IIIB anemia, right external carotid artery disease who presents for follow-up patient feels well denies chest pain, shortness of breath distant exertion palpitation lightheadedness dizziness complains of right hip pain getting MRI and bilateral leg edema.

## 2021-07-20 NOTE — PHYSICAL EXAM
[Normal Sclera/Conjunctiva] : normal sclera/conjunctiva [Normal Outer Ear/Nose] : the outer ears and nose were normal in appearance [No JVD] : no jugular venous distention [No Lymphadenopathy] : no lymphadenopathy [Supple] : supple [Normal Rate] : normal rate  [Regular Rhythm] : with a regular rhythm [Normal S1, S2] : normal S1 and S2 [No Abdominal Bruit] : a ~M bruit was not heard ~T in the abdomen [No Palpable Aorta] : no palpable aorta [Normal] : no CVA or spinal tenderness [de-identified] : Right bruit [de-identified] : Bilateral one plus edema [de-identified] : 2/6 systolic murmur

## 2021-07-20 NOTE — REVIEW OF SYSTEMS
[Lower Ext Edema] : lower extremity edema [Muscle Pain] : muscle pain [Negative] : Genitourinary [Palpitations] : no palpitations [Claudication] : no  leg claudication [Orthopena] : no orthopnea [Paroxysmal Nocturnal Dyspnea] : no paroxysmal nocturnal dyspnea [Joint Pain] : no joint pain [Joint Stiffness] : no joint stiffness [Back Pain] : no back pain [Joint Swelling] : no joint swelling

## 2021-07-20 NOTE — ASSESSMENT
[FreeTextEntry1] : The patient is a 81-year-old male with history of chronic kidney disease stage III B, hypertension, hyperlipidemia, coronary disease, peripheral vascular disease, carotid artery disease, macrocytic anemia, obesity, gout, and history of stroke/TIA syndrome\par \par 1. High-grade right external carotid artery stenosis\par continue aggressive cholesterol and aspirin\par discuss with cardiology\par \par 2. Chronic kidney disease\par repeat BUN creatinine\par follow-up with nephrology\par \par 3. Anemia\par etiology unclear most likely related to kidney disease\par MCV microcytic\par \par 4. Peripheral vascular disease\par follow-up with vascular cardiology\par \par 5 coronary disease\par continue aggressive cholesterol and aspirin\par \par 6. Hypertension\par continue amlodipine five\par \par 7. Hyperlipidemia\par unclear whether on Lipitor 80 or Crestor 10\par \par 8 leg edema\par most likely venous insufficiency and amlodipine continue Lasix 40\par \par 9 gout/hyperuricemia.\par consider using allopurinol if develops\par follow-up in two months\par

## 2021-07-29 ENCOUNTER — OUTPATIENT (OUTPATIENT)
Dept: OUTPATIENT SERVICES | Facility: HOSPITAL | Age: 81
LOS: 1 days | End: 2021-07-29
Payer: MEDICARE

## 2021-07-29 ENCOUNTER — APPOINTMENT (OUTPATIENT)
Dept: MRI IMAGING | Facility: CLINIC | Age: 81
End: 2021-07-29
Payer: MEDICARE

## 2021-07-29 DIAGNOSIS — Z98.49 CATARACT EXTRACTION STATUS, UNSPECIFIED EYE: Chronic | ICD-10-CM

## 2021-07-29 DIAGNOSIS — Z90.79 ACQUIRED ABSENCE OF OTHER GENITAL ORGAN(S): Chronic | ICD-10-CM

## 2021-07-29 DIAGNOSIS — Z86.79 PERSONAL HISTORY OF OTHER DISEASES OF THE CIRCULATORY SYSTEM: Chronic | ICD-10-CM

## 2021-07-29 DIAGNOSIS — Z98.890 OTHER SPECIFIED POSTPROCEDURAL STATES: Chronic | ICD-10-CM

## 2021-07-29 DIAGNOSIS — M54.16 RADICULOPATHY, LUMBAR REGION: ICD-10-CM

## 2021-07-29 PROCEDURE — 72148 MRI LUMBAR SPINE W/O DYE: CPT

## 2021-07-29 PROCEDURE — 72148 MRI LUMBAR SPINE W/O DYE: CPT | Mod: 26

## 2021-08-06 LAB
ANION GAP SERPL CALC-SCNC: 17 MMOL/L
BASOPHILS # BLD AUTO: 0.04 K/UL
BASOPHILS NFR BLD AUTO: 0.7 %
BUN SERPL-MCNC: 36 MG/DL
CALCIUM SERPL-MCNC: 9.1 MG/DL
CHLORIDE SERPL-SCNC: 108 MMOL/L
CHOLEST SERPL-MCNC: 107 MG/DL
CO2 SERPL-SCNC: 19 MMOL/L
CREAT SERPL-MCNC: 2.5 MG/DL
EOSINOPHIL # BLD AUTO: 0.16 K/UL
EOSINOPHIL NFR BLD AUTO: 3 %
GLUCOSE SERPL-MCNC: 131 MG/DL
HCT VFR BLD CALC: 30.3 %
HDLC SERPL-MCNC: 37 MG/DL
HGB BLD-MCNC: 9.9 G/DL
IMM GRANULOCYTES NFR BLD AUTO: 0.9 %
LDLC SERPL CALC-MCNC: 46 MG/DL
LYMPHOCYTES # BLD AUTO: 0.93 K/UL
LYMPHOCYTES NFR BLD AUTO: 17.3 %
MAN DIFF?: NORMAL
MCHC RBC-ENTMCNC: 32.7 GM/DL
MCHC RBC-ENTMCNC: 39 PG
MCV RBC AUTO: 119.3 FL
MONOCYTES # BLD AUTO: 0.45 K/UL
MONOCYTES NFR BLD AUTO: 8.3 %
NEUTROPHILS # BLD AUTO: 3.76 K/UL
NEUTROPHILS NFR BLD AUTO: 69.8 %
NONHDLC SERPL-MCNC: 70 MG/DL
PLATELET # BLD AUTO: 167 K/UL
POTASSIUM SERPL-SCNC: 4.8 MMOL/L
RBC # BLD: 2.54 M/UL
RBC # FLD: 18.5 %
SODIUM SERPL-SCNC: 144 MMOL/L
TRIGL SERPL-MCNC: 119 MG/DL
WBC # FLD AUTO: 5.39 K/UL

## 2021-08-11 ENCOUNTER — APPOINTMENT (OUTPATIENT)
Dept: ORTHOPEDIC SURGERY | Facility: CLINIC | Age: 81
End: 2021-08-11
Payer: MEDICARE

## 2021-08-11 DIAGNOSIS — M40.209 UNSPECIFIED KYPHOSIS, SITE UNSPECIFIED: ICD-10-CM

## 2021-08-11 DIAGNOSIS — M43.16 SPONDYLOLISTHESIS, LUMBAR REGION: ICD-10-CM

## 2021-08-11 PROCEDURE — 99214 OFFICE O/P EST MOD 30 MIN: CPT

## 2021-08-11 NOTE — HISTORY OF PRESENT ILLNESS
[de-identified] : Mr. MASOOD MACARIO  is a 81 year old male who presents to the office for a follow-up visit.  He is here to review his MRI results.

## 2021-08-11 NOTE — DISCUSSION/SUMMARY
[de-identified] : We discussed further treatment options both nonsurgical and surgical.  At this point he wished to continue with nonsurgical treatment.  He will be evaluated for possible epidural injections.  Follow-up afterwards or sooner with any changes or worsening of his symptoms.

## 2021-08-11 NOTE — PHYSICAL EXAM
[Antalgic] : antalgic [Cane] : ambulates with cane [de-identified] : Examination of the lumbar spine reveals no midline tenderness palpation, step-offs, or skin lesions. Decreased range of motion with respect to flexion, extension, lateral bending, and rotation. No tenderness to palpation of the sciatic notch. No tenderness palpation of the bilateral greater trochanters. No pain with passive internal/external rotation of the hips. No instability of bilateral lower extremities.  Negative MICHAEL. Negative straight leg raise bilaterally. No bowstring. Negative femoral stretch. 5 out of 5 iliopsoas, hip abductors, hips adductors, quadriceps, hamstrings, gastrocsoleus, tibialis anterior, extensor hallucis longus, peroneals. Grossly intact sensation to light touch bilateral lower extremities. 1+ patellar and Achilles reflexes. Downgoing Babinski. No clonus. Intact proprioception. Palpable pulses. No skin lesion and no edema on the right and left lower extremities. [de-identified] : AP lateral lumbar x-rays reveals lumbar spondylosis and kyphosis.  He appears to have some blocked vertebrae as well\par \par Lumbar spine MRI does reveal moderate stenosis from L3-S1 centrally as well as some severe foraminal stenosis from L4-S1.  L4-5 spondylolisthesis.

## 2021-08-26 ENCOUNTER — RX RENEWAL (OUTPATIENT)
Age: 81
End: 2021-08-26

## 2021-09-21 ENCOUNTER — LABORATORY RESULT (OUTPATIENT)
Age: 81
End: 2021-09-21

## 2021-09-21 ENCOUNTER — APPOINTMENT (OUTPATIENT)
Dept: INTERNAL MEDICINE | Facility: CLINIC | Age: 81
End: 2021-09-21
Payer: MEDICARE

## 2021-09-21 VITALS
DIASTOLIC BLOOD PRESSURE: 87 MMHG | HEIGHT: 63 IN | HEART RATE: 68 BPM | SYSTOLIC BLOOD PRESSURE: 134 MMHG | BODY MASS INDEX: 35.08 KG/M2 | OXYGEN SATURATION: 98 % | WEIGHT: 198 LBS | TEMPERATURE: 98.7 F

## 2021-09-21 PROCEDURE — 36415 COLL VENOUS BLD VENIPUNCTURE: CPT

## 2021-09-21 PROCEDURE — G0008: CPT

## 2021-09-21 PROCEDURE — 90662 IIV NO PRSV INCREASED AG IM: CPT

## 2021-09-21 PROCEDURE — 99214 OFFICE O/P EST MOD 30 MIN: CPT | Mod: 25

## 2021-09-21 NOTE — PHYSICAL EXAM
[Normal Sclera/Conjunctiva] : normal sclera/conjunctiva [Normal Outer Ear/Nose] : the outer ears and nose were normal in appearance [Normal] : no CVA or spinal tenderness [de-identified] : Bilateral 2+ edema stasis changes

## 2021-09-21 NOTE — HISTORY OF PRESENT ILLNESS
[FreeTextEntry1] : Follow-up for hypertension, gout, kidney disease, anemia [de-identified] : Patient is an 81-year-old male with history of coronary disease, obesity, stage III B kidney disease, hypertension, hyperlipidemia, lumbar stenosis, history of fatty liver, peripheral vascular disease, venous insufficiency history of TIA stroke syndrome obesity history of renal artery stenosis status post stent prostate cancer who presents for follow-up. Patient feels well complains of back pain and leg weakness getting acupuncture physical therapy will discuss APIs with vascular cardiology denies chest pain, shortness of breath polyuria polydipsia

## 2021-09-21 NOTE — REVIEW OF SYSTEMS
[Muscle Pain] : muscle pain [Muscle Weakness] : muscle weakness [Negative] : Psychiatric [Joint Pain] : no joint pain [Joint Stiffness] : no joint stiffness [Back Pain] : no back pain [Joint Swelling] : no joint swelling

## 2021-09-21 NOTE — ASSESSMENT
[FreeTextEntry1] : Patient is a 81-year-old male with history of obesity, hypertension, hyperlipidemia, fatty liver, gout, chronic kidney disease stage III B, coronary disease, anemia, fatty liver, peripheral vascular disease, venous insufficiency, external carotid disease, stroke syndrome/TIA macrocytic anemia who presents for follow-up\par \par 1. Macrocytic  anemia\par etiology unclear has slight elevated retake count\par normal LFTs, most likely multifactorial from kidney disease and hemolysis of unclear etiology\par check have to globulins, CBC will discuss with renal\par \par 2. Chronic renal insufficiency\par avoid all nonsteroidal's colchicine\par use prednisone\par follow-up with renal check BUN creatinine\par \par 3 leg edema\par cannot tolerate stockings secondary peripheral vascular\par elevation\par Lasix 40 mg a day check electrolytes\par \par 4 hypertension\par continue amlodipine 5 mg carvedilol 25\par \par 5, coronary disease\par continue aspirin Plavix carvedilol 25 asymptomatic\par \par 6 hyperlipidemia \par unclear whether Lipitor 80 or Crestor 10 will discuss with cardiology\par \par 7 health maintenance\par patient received J&J in July\par hi dose flu shot\par follow-up in 3 to 4 months\par \par

## 2021-09-30 LAB
ALBUMIN SERPL ELPH-MCNC: 5 G/DL
ALP BLD-CCNC: 76 U/L
ALT SERPL-CCNC: 16 U/L
ANION GAP SERPL CALC-SCNC: 15 MMOL/L
AST SERPL-CCNC: 14 U/L
BASOPHILS # BLD AUTO: 0.06 K/UL
BASOPHILS NFR BLD AUTO: 1.2 %
BILIRUB SERPL-MCNC: 0.8 MG/DL
BUN SERPL-MCNC: 26 MG/DL
CA SERPL QL: NORMAL
CALCIUM SERPL-MCNC: 9.3 MG/DL
CHLORIDE SERPL-SCNC: 107 MMOL/L
CO2 SERPL-SCNC: 22 MMOL/L
CREAT SERPL-MCNC: 2.1 MG/DL
EOSINOPHIL # BLD AUTO: 0.21 K/UL
EOSINOPHIL NFR BLD AUTO: 4.2 %
GLUCOSE SERPL-MCNC: 145 MG/DL
HAPTOGLOB SERPL-MCNC: 68 MG/DL
HCT VFR BLD CALC: 35.6 %
HGB BLD-MCNC: 11.1 G/DL
IMM GRANULOCYTES NFR BLD AUTO: 1 %
LYMPHOCYTES # BLD AUTO: 1.04 K/UL
LYMPHOCYTES NFR BLD AUTO: 20.6 %
MAN DIFF?: NORMAL
MCHC RBC-ENTMCNC: 31.2 GM/DL
MCHC RBC-ENTMCNC: 37.8 PG
MCV RBC AUTO: 121.1 FL
MONOCYTES # BLD AUTO: 0.44 K/UL
MONOCYTES NFR BLD AUTO: 8.7 %
NEUTROPHILS # BLD AUTO: 3.25 K/UL
NEUTROPHILS NFR BLD AUTO: 64.3 %
PLATELET # BLD AUTO: 189 K/UL
POTASSIUM SERPL-SCNC: 4.8 MMOL/L
PROT SERPL-MCNC: 7 G/DL
RBC # BLD: 2.94 M/UL
RBC # FLD: 19.2 %
SODIUM SERPL-SCNC: 144 MMOL/L
WBC # FLD AUTO: 5.05 K/UL

## 2021-10-06 ENCOUNTER — APPOINTMENT (OUTPATIENT)
Dept: CARDIOLOGY | Facility: CLINIC | Age: 81
End: 2021-10-06
Payer: MEDICARE

## 2021-10-06 VITALS
DIASTOLIC BLOOD PRESSURE: 60 MMHG | HEIGHT: 63 IN | HEART RATE: 71 BPM | TEMPERATURE: 97.8 F | OXYGEN SATURATION: 97 % | SYSTOLIC BLOOD PRESSURE: 120 MMHG | BODY MASS INDEX: 34.91 KG/M2 | WEIGHT: 197 LBS

## 2021-10-06 PROCEDURE — 99214 OFFICE O/P EST MOD 30 MIN: CPT

## 2021-10-06 NOTE — PHYSICAL EXAM
[General Appearance - Well Developed] : well developed [Normal Appearance] : normal appearance [Well Groomed] : well groomed [General Appearance - Well Nourished] : well nourished [No Deformities] : no deformities [General Appearance - In No Acute Distress] : no acute distress [Normal Conjunctiva] : the conjunctiva exhibited no abnormalities [Eyelids - No Xanthelasma] : the eyelids demonstrated no xanthelasmas [Normal Oral Mucosa] : normal oral mucosa [No Oral Pallor] : no oral pallor [No Oral Cyanosis] : no oral cyanosis [Normal Jugular Venous A Waves Present] : normal jugular venous A waves present [Normal Jugular Venous V Waves Present] : normal jugular venous V waves present [No Jugular Venous Stearns A Waves] : no jugular venous stearns A waves [Respiration, Rhythm And Depth] : normal respiratory rhythm and effort [Exaggerated Use Of Accessory Muscles For Inspiration] : no accessory muscle use [Auscultation Breath Sounds / Voice Sounds] : lungs were clear to auscultation bilaterally [Heart Rate And Rhythm] : heart rate and rhythm were normal [Heart Sounds] : normal S1 and S2 [Murmurs] : no murmurs present [Abdomen Soft] : soft [Abdomen Tenderness] : non-tender [Abdomen Mass (___ Cm)] : no abdominal mass palpated [Abnormal Walk] : normal gait [Cyanosis, Localized] : no localized cyanosis [] : no rash [No Venous Stasis] : no venous stasis [Oriented To Time, Place, And Person] : oriented to person, place, and time [Affect] : the affect was normal [Mood] : the mood was normal [No Anxiety] : not feeling anxious [FreeTextEntry1] : Audible Biphasic to Triphasic Doppler Pulses B/L.  Mild edema

## 2021-10-06 NOTE — REVIEW OF SYSTEMS
[Leg Claudication] : intermittent leg claudication [Negative] : Heme/Lymph [Lower Ext Edema] : lower extremity edema

## 2021-10-06 NOTE — ASSESSMENT
[FreeTextEntry1] : Assessment:\par 1.  Renal artery stenosis\par -s/p L RA stent\par 2.  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  PAD\par 5. 3 vessel coronary calcifications on CT\par s/p CATH March 18th:  m LAD 60%, OM2 60%, RCA \par \par Plan\par 1. BP and HR well controlled, he has no coronary symptoms at the present moment\par 2.  Creatinine has stabliized\par 3.  Daily walking, daily foot checks\par 4.  Followup with ortho spine for his leg pains\par 5.  Conservative management of the PAD\par 6.  No changes to his medicaitons.\par 7.  Conitnue lasix every other day.\par 8.  Continue cilostazol 50mg BID\par 9.  Continue DAPT with aspirin and plavix.\par 10.  Will order echocardiogram to assess murmer on exam and LE edema (especially with his know CAD)\par 11.  Return in 6 months.

## 2021-10-06 NOTE — REASON FOR VISIT
[Follow-Up - Clinic] : a clinic follow-up of [FreeTextEntry1] : 10/6/2021\par \par He is going for accupuncture\par it is helping somewhat\par Seen by Dr. Medel - he has spinal dz L3-S1\par Undergoing accupunture\par no PT - he declined. \par Otherwise he has no chest pain\par Breathing ok .\par Diuretic every other day, helps with leg swelling\par no wounds on the feet or toes. \par Mild edema of the legs\par \par Labs :\par \par hemoglobin 11.1 (previously 9.9)\par Creatinine 2.10 (previously 2.5)\par \par \par \par \par 6/30/2021\par Complains of calf pain with walking\par R hip pains\par Pins and needs bottom of right foot\par most recent creatinine is 2.39\par He remains on cilostazol 50mg BID\par He remains on plavix\par BP is well controlled\par The left leg is not as bad as the right\par Complains of some ankle pains\par No open wounds or tissue loss on the feet. \par No chest pain \par no chest pressure.  \par \par 3/31/2021\par \par He had R Radial cath\par  of the RCA\par otherwise 60% stenosis\par No chest pain \par no angina\par he has mild to mod claudcation both legs, no rest pain.  not really active.  Will medically managae\par \par Seen by Mohsen Cabral nephrology, all stable.  \par \par Medications:\par Amlodipine 5 mg\par Aspirin 81\par Calcitriol \par Coreg 2 5mg BID\par Plavix 75 mg daily \par Colchicine 0.6 mg daily for gout.  \par Crestor 10 mg daily \par Lasix 40mg every other day\par  \par \par \par BP at home is 160-170 at home. \par His BP here is 148/75\par \par He complains of leg pains, both, entire legs, with walking.  \par \par \par Furosemide 40 mg every other day \par \par \par 1/15/2021\par Discussed arterial duplex results with patient, diffuse disease noted.\par Chap[place] Class 3 claudication.\par Also reporting bilateral healed / healing sores.\par Plans for labs and office visit next week.\par Decision if to proceed with peripheral angiogram on office visit. \par Call office if any worsening symptoms occurring. \par \par Here for followup  12/2/2020\par \par Had followed up with nephrology Dr. Cabral 2 weeks ago\par All good.  Had labwork done, states all is ok\par Needs tingling of the right leg, bottom of the foot\par Hard to move it  sometimes\par Not worse with walking\par he gets tired with walking.  The right is worse than the left.  \par \par Urinating no problems\par \par \par \par

## 2021-11-09 ENCOUNTER — APPOINTMENT (OUTPATIENT)
Dept: CARDIOLOGY | Facility: CLINIC | Age: 81
End: 2021-11-09
Payer: MEDICARE

## 2021-11-09 PROCEDURE — 93306 TTE W/DOPPLER COMPLETE: CPT

## 2021-11-12 ENCOUNTER — NON-APPOINTMENT (OUTPATIENT)
Age: 81
End: 2021-11-12

## 2021-11-12 DIAGNOSIS — I77.810 THORACIC AORTIC ECTASIA: ICD-10-CM

## 2021-12-26 ENCOUNTER — INPATIENT (INPATIENT)
Facility: HOSPITAL | Age: 81
LOS: 9 days | Discharge: NURSING, REHAB OR LONG-TERM CARE | DRG: 392 | End: 2022-01-05
Attending: STUDENT IN AN ORGANIZED HEALTH CARE EDUCATION/TRAINING PROGRAM | Admitting: HOSPITALIST
Payer: MEDICARE

## 2021-12-26 VITALS
WEIGHT: 190.04 LBS | DIASTOLIC BLOOD PRESSURE: 66 MMHG | SYSTOLIC BLOOD PRESSURE: 152 MMHG | HEART RATE: 70 BPM | HEIGHT: 67 IN | OXYGEN SATURATION: 98 %

## 2021-12-26 DIAGNOSIS — Z29.9 ENCOUNTER FOR PROPHYLACTIC MEASURES, UNSPECIFIED: ICD-10-CM

## 2021-12-26 DIAGNOSIS — Z98.49 CATARACT EXTRACTION STATUS, UNSPECIFIED EYE: Chronic | ICD-10-CM

## 2021-12-26 DIAGNOSIS — C61 MALIGNANT NEOPLASM OF PROSTATE: ICD-10-CM

## 2021-12-26 DIAGNOSIS — R11.10 VOMITING, UNSPECIFIED: ICD-10-CM

## 2021-12-26 DIAGNOSIS — E78.5 HYPERLIPIDEMIA, UNSPECIFIED: ICD-10-CM

## 2021-12-26 DIAGNOSIS — Z98.890 OTHER SPECIFIED POSTPROCEDURAL STATES: Chronic | ICD-10-CM

## 2021-12-26 DIAGNOSIS — R42 DIZZINESS AND GIDDINESS: ICD-10-CM

## 2021-12-26 DIAGNOSIS — I73.9 PERIPHERAL VASCULAR DISEASE, UNSPECIFIED: ICD-10-CM

## 2021-12-26 DIAGNOSIS — N18.30 CHRONIC KIDNEY DISEASE, STAGE 3 UNSPECIFIED: ICD-10-CM

## 2021-12-26 DIAGNOSIS — Z90.79 ACQUIRED ABSENCE OF OTHER GENITAL ORGAN(S): Chronic | ICD-10-CM

## 2021-12-26 DIAGNOSIS — I10 ESSENTIAL (PRIMARY) HYPERTENSION: ICD-10-CM

## 2021-12-26 DIAGNOSIS — Z86.79 PERSONAL HISTORY OF OTHER DISEASES OF THE CIRCULATORY SYSTEM: Chronic | ICD-10-CM

## 2021-12-26 LAB
ALBUMIN SERPL ELPH-MCNC: 5 G/DL — SIGNIFICANT CHANGE UP (ref 3.3–5)
ALP SERPL-CCNC: 71 U/L — SIGNIFICANT CHANGE UP (ref 40–120)
ALT FLD-CCNC: 11 U/L — SIGNIFICANT CHANGE UP (ref 10–45)
ANION GAP SERPL CALC-SCNC: 15 MMOL/L — SIGNIFICANT CHANGE UP (ref 5–17)
ANISOCYTOSIS BLD QL: SLIGHT — SIGNIFICANT CHANGE UP
APPEARANCE UR: CLEAR — SIGNIFICANT CHANGE UP
AST SERPL-CCNC: 12 U/L — SIGNIFICANT CHANGE UP (ref 10–40)
BACTERIA # UR AUTO: NEGATIVE — SIGNIFICANT CHANGE UP
BASE EXCESS BLDV CALC-SCNC: -1.6 MMOL/L — SIGNIFICANT CHANGE UP (ref -2–2)
BASOPHILS # BLD AUTO: 0.06 K/UL — SIGNIFICANT CHANGE UP (ref 0–0.2)
BASOPHILS NFR BLD AUTO: 0.9 % — SIGNIFICANT CHANGE UP (ref 0–2)
BILIRUB SERPL-MCNC: 0.8 MG/DL — SIGNIFICANT CHANGE UP (ref 0.2–1.2)
BILIRUB UR-MCNC: NEGATIVE — SIGNIFICANT CHANGE UP
BUN SERPL-MCNC: 35 MG/DL — HIGH (ref 7–23)
CA-I SERPL-SCNC: 1.24 MMOL/L — SIGNIFICANT CHANGE UP (ref 1.15–1.33)
CALCIUM SERPL-MCNC: 9.4 MG/DL — SIGNIFICANT CHANGE UP (ref 8.4–10.5)
CHLORIDE BLDV-SCNC: 105 MMOL/L — SIGNIFICANT CHANGE UP (ref 96–108)
CHLORIDE SERPL-SCNC: 104 MMOL/L — SIGNIFICANT CHANGE UP (ref 96–108)
CO2 BLDV-SCNC: 27 MMOL/L — HIGH (ref 22–26)
CO2 SERPL-SCNC: 22 MMOL/L — SIGNIFICANT CHANGE UP (ref 22–31)
COLOR SPEC: SIGNIFICANT CHANGE UP
CREAT SERPL-MCNC: 1.88 MG/DL — HIGH (ref 0.5–1.3)
DACRYOCYTES BLD QL SMEAR: SLIGHT — SIGNIFICANT CHANGE UP
DIFF PNL FLD: ABNORMAL
ELLIPTOCYTES BLD QL SMEAR: SLIGHT — SIGNIFICANT CHANGE UP
EOSINOPHIL # BLD AUTO: 0 K/UL — SIGNIFICANT CHANGE UP (ref 0–0.5)
EOSINOPHIL NFR BLD AUTO: 0 % — SIGNIFICANT CHANGE UP (ref 0–6)
EPI CELLS # UR: 1 /HPF — SIGNIFICANT CHANGE UP
GAS PNL BLDV: 140 MMOL/L — SIGNIFICANT CHANGE UP (ref 136–145)
GAS PNL BLDV: SIGNIFICANT CHANGE UP
GAS PNL BLDV: SIGNIFICANT CHANGE UP
GLUCOSE BLDV-MCNC: 254 MG/DL — HIGH (ref 70–99)
GLUCOSE SERPL-MCNC: 253 MG/DL — HIGH (ref 70–99)
GLUCOSE UR QL: ABNORMAL
HCO3 BLDV-SCNC: 25 MMOL/L — SIGNIFICANT CHANGE UP (ref 22–29)
HCT VFR BLD CALC: 35.3 % — LOW (ref 39–50)
HCT VFR BLDA CALC: 37 % — LOW (ref 39–51)
HGB BLD CALC-MCNC: 12.4 G/DL — LOW (ref 12.6–17.4)
HGB BLD-MCNC: 12.2 G/DL — LOW (ref 13–17)
HYALINE CASTS # UR AUTO: 0 /LPF — SIGNIFICANT CHANGE UP (ref 0–2)
KETONES UR-MCNC: NEGATIVE — SIGNIFICANT CHANGE UP
LACTATE BLDV-MCNC: 1.4 MMOL/L — SIGNIFICANT CHANGE UP (ref 0.7–2)
LEUKOCYTE ESTERASE UR-ACNC: NEGATIVE — SIGNIFICANT CHANGE UP
LIDOCAIN IGE QN: 36 U/L — SIGNIFICANT CHANGE UP (ref 7–60)
LYMPHOCYTES # BLD AUTO: 0.76 K/UL — LOW (ref 1–3.3)
LYMPHOCYTES # BLD AUTO: 11.5 % — LOW (ref 13–44)
MACROCYTES BLD QL: SLIGHT — SIGNIFICANT CHANGE UP
MAGNESIUM SERPL-MCNC: 2.3 MG/DL — SIGNIFICANT CHANGE UP (ref 1.6–2.6)
MANUAL SMEAR VERIFICATION: SIGNIFICANT CHANGE UP
MCHC RBC-ENTMCNC: 34.6 GM/DL — SIGNIFICANT CHANGE UP (ref 32–36)
MCHC RBC-ENTMCNC: 38 PG — HIGH (ref 27–34)
MCV RBC AUTO: 110 FL — HIGH (ref 80–100)
MONOCYTES # BLD AUTO: 0.11 K/UL — SIGNIFICANT CHANGE UP (ref 0–0.9)
MONOCYTES NFR BLD AUTO: 1.7 % — LOW (ref 2–14)
NEUTROPHILS # BLD AUTO: 5.68 K/UL — SIGNIFICANT CHANGE UP (ref 1.8–7.4)
NEUTROPHILS NFR BLD AUTO: 84.1 % — HIGH (ref 43–77)
NEUTS BAND # BLD: 1.8 % — SIGNIFICANT CHANGE UP (ref 0–8)
NITRITE UR-MCNC: NEGATIVE — SIGNIFICANT CHANGE UP
NT-PROBNP SERPL-SCNC: 334 PG/ML — HIGH (ref 0–300)
PCO2 BLDV: 50 MMHG — SIGNIFICANT CHANGE UP (ref 42–55)
PH BLDV: 7.31 — LOW (ref 7.32–7.43)
PH UR: 6.5 — SIGNIFICANT CHANGE UP (ref 5–8)
PLAT MORPH BLD: NORMAL — SIGNIFICANT CHANGE UP
PLATELET # BLD AUTO: 176 K/UL — SIGNIFICANT CHANGE UP (ref 150–400)
PO2 BLDV: 23 MMHG — LOW (ref 25–45)
POIKILOCYTOSIS BLD QL AUTO: SIGNIFICANT CHANGE UP
POLYCHROMASIA BLD QL SMEAR: SLIGHT — SIGNIFICANT CHANGE UP
POTASSIUM BLDV-SCNC: 4.3 MMOL/L — SIGNIFICANT CHANGE UP (ref 3.5–5.1)
POTASSIUM SERPL-MCNC: 4.4 MMOL/L — SIGNIFICANT CHANGE UP (ref 3.5–5.3)
POTASSIUM SERPL-SCNC: 4.4 MMOL/L — SIGNIFICANT CHANGE UP (ref 3.5–5.3)
PROT SERPL-MCNC: 7.5 G/DL — SIGNIFICANT CHANGE UP (ref 6–8.3)
PROT UR-MCNC: 100 — SIGNIFICANT CHANGE UP
RBC # BLD: 3.21 M/UL — LOW (ref 4.2–5.8)
RBC # FLD: 16.6 % — HIGH (ref 10.3–14.5)
RBC BLD AUTO: ABNORMAL
RBC CASTS # UR COMP ASSIST: 1 /HPF — SIGNIFICANT CHANGE UP (ref 0–4)
SAO2 % BLDV: 36.5 % — LOW (ref 67–88)
SARS-COV-2 RNA SPEC QL NAA+PROBE: SIGNIFICANT CHANGE UP
SODIUM SERPL-SCNC: 141 MMOL/L — SIGNIFICANT CHANGE UP (ref 135–145)
SP GR SPEC: 1.01 — SIGNIFICANT CHANGE UP (ref 1.01–1.02)
TROPONIN T, HIGH SENSITIVITY RESULT: 42 NG/L — SIGNIFICANT CHANGE UP (ref 0–51)
TROPONIN T, HIGH SENSITIVITY RESULT: 43 NG/L — SIGNIFICANT CHANGE UP (ref 0–51)
UROBILINOGEN FLD QL: NEGATIVE — SIGNIFICANT CHANGE UP
WBC # BLD: 6.61 K/UL — SIGNIFICANT CHANGE UP (ref 3.8–10.5)
WBC # FLD AUTO: 6.61 K/UL — SIGNIFICANT CHANGE UP (ref 3.8–10.5)
WBC UR QL: 2 /HPF — SIGNIFICANT CHANGE UP (ref 0–5)

## 2021-12-26 PROCEDURE — 70450 CT HEAD/BRAIN W/O DYE: CPT | Mod: 26,MA

## 2021-12-26 PROCEDURE — 71045 X-RAY EXAM CHEST 1 VIEW: CPT | Mod: 26

## 2021-12-26 PROCEDURE — 74176 CT ABD & PELVIS W/O CONTRAST: CPT | Mod: 26,MA

## 2021-12-26 PROCEDURE — 93010 ELECTROCARDIOGRAM REPORT: CPT

## 2021-12-26 PROCEDURE — 99223 1ST HOSP IP/OBS HIGH 75: CPT

## 2021-12-26 PROCEDURE — 99285 EMERGENCY DEPT VISIT HI MDM: CPT

## 2021-12-26 RX ORDER — POLYETHYLENE GLYCOL 3350 17 G/17G
17 POWDER, FOR SOLUTION ORAL DAILY
Refills: 0 | Status: DISCONTINUED | OUTPATIENT
Start: 2021-12-26 | End: 2022-01-05

## 2021-12-26 RX ORDER — ASPIRIN/CALCIUM CARB/MAGNESIUM 324 MG
81 TABLET ORAL DAILY
Refills: 0 | Status: DISCONTINUED | OUTPATIENT
Start: 2021-12-26 | End: 2022-01-05

## 2021-12-26 RX ORDER — CILOSTAZOL 100 MG/1
100 TABLET ORAL
Refills: 0 | Status: DISCONTINUED | OUTPATIENT
Start: 2021-12-26 | End: 2022-01-05

## 2021-12-26 RX ORDER — METOCLOPRAMIDE HCL 10 MG
10 TABLET ORAL ONCE
Refills: 0 | Status: COMPLETED | OUTPATIENT
Start: 2021-12-26 | End: 2021-12-26

## 2021-12-26 RX ORDER — ATORVASTATIN CALCIUM 80 MG/1
40 TABLET, FILM COATED ORAL AT BEDTIME
Refills: 0 | Status: DISCONTINUED | OUTPATIENT
Start: 2021-12-26 | End: 2022-01-05

## 2021-12-26 RX ORDER — AMLODIPINE BESYLATE 2.5 MG/1
5 TABLET ORAL DAILY
Refills: 0 | Status: DISCONTINUED | OUTPATIENT
Start: 2021-12-26 | End: 2022-01-05

## 2021-12-26 RX ORDER — CLOPIDOGREL BISULFATE 75 MG/1
75 TABLET, FILM COATED ORAL DAILY
Refills: 0 | Status: DISCONTINUED | OUTPATIENT
Start: 2021-12-26 | End: 2022-01-05

## 2021-12-26 RX ORDER — ACETAMINOPHEN 500 MG
650 TABLET ORAL EVERY 6 HOURS
Refills: 0 | Status: DISCONTINUED | OUTPATIENT
Start: 2021-12-26 | End: 2022-01-05

## 2021-12-26 RX ORDER — CALCITRIOL 0.5 UG/1
0.25 CAPSULE ORAL
Refills: 0 | Status: DISCONTINUED | OUTPATIENT
Start: 2021-12-26 | End: 2022-01-05

## 2021-12-26 RX ORDER — SENNA PLUS 8.6 MG/1
2 TABLET ORAL AT BEDTIME
Refills: 0 | Status: DISCONTINUED | OUTPATIENT
Start: 2021-12-26 | End: 2022-01-05

## 2021-12-26 RX ORDER — INFLUENZA VIRUS VACCINE 15; 15; 15; 15 UG/.5ML; UG/.5ML; UG/.5ML; UG/.5ML
0.7 SUSPENSION INTRAMUSCULAR ONCE
Refills: 0 | Status: COMPLETED | OUTPATIENT
Start: 2021-12-26 | End: 2021-12-26

## 2021-12-26 RX ORDER — HEPARIN SODIUM 5000 [USP'U]/ML
5000 INJECTION INTRAVENOUS; SUBCUTANEOUS EVERY 8 HOURS
Refills: 0 | Status: DISCONTINUED | OUTPATIENT
Start: 2021-12-26 | End: 2022-01-05

## 2021-12-26 RX ORDER — CARVEDILOL PHOSPHATE 80 MG/1
25 CAPSULE, EXTENDED RELEASE ORAL EVERY 12 HOURS
Refills: 0 | Status: DISCONTINUED | OUTPATIENT
Start: 2021-12-26 | End: 2022-01-05

## 2021-12-26 RX ORDER — SODIUM CHLORIDE 9 MG/ML
1000 INJECTION, SOLUTION INTRAVENOUS ONCE
Refills: 0 | Status: COMPLETED | OUTPATIENT
Start: 2021-12-26 | End: 2021-12-26

## 2021-12-26 RX ORDER — COLCHICINE 0.6 MG
1 TABLET ORAL
Qty: 0 | Refills: 0 | DISCHARGE

## 2021-12-26 RX ORDER — SODIUM CHLORIDE 9 MG/ML
1000 INJECTION INTRAMUSCULAR; INTRAVENOUS; SUBCUTANEOUS
Refills: 0 | Status: DISCONTINUED | OUTPATIENT
Start: 2021-12-26 | End: 2021-12-27

## 2021-12-26 RX ORDER — FUROSEMIDE 40 MG
40 TABLET ORAL
Refills: 0 | Status: DISCONTINUED | OUTPATIENT
Start: 2021-12-26 | End: 2021-12-30

## 2021-12-26 RX ORDER — ONDANSETRON 8 MG/1
4 TABLET, FILM COATED ORAL EVERY 8 HOURS
Refills: 0 | Status: DISCONTINUED | OUTPATIENT
Start: 2021-12-26 | End: 2022-01-05

## 2021-12-26 RX ADMIN — CILOSTAZOL 100 MILLIGRAM(S): 100 TABLET ORAL at 19:53

## 2021-12-26 RX ADMIN — CARVEDILOL PHOSPHATE 25 MILLIGRAM(S): 80 CAPSULE, EXTENDED RELEASE ORAL at 18:49

## 2021-12-26 RX ADMIN — SODIUM CHLORIDE 65 MILLILITER(S): 9 INJECTION INTRAMUSCULAR; INTRAVENOUS; SUBCUTANEOUS at 18:49

## 2021-12-26 RX ADMIN — SODIUM CHLORIDE 1000 MILLILITER(S): 9 INJECTION, SOLUTION INTRAVENOUS at 14:07

## 2021-12-26 RX ADMIN — Medication 10 MILLIGRAM(S): at 14:08

## 2021-12-26 NOTE — ED PROVIDER NOTE - AXIS
Normal
PAST MEDICAL HISTORY:  GERD (gastroesophageal reflux disease)     History of basal cell cancer     HTN (hypertension)     OAB (overactive bladder)     Thyroid disease

## 2021-12-26 NOTE — H&P ADULT - PROBLEM/PLAN-6
"-- DO NOT REPLY / DO NOT REPLY ALL --  -- Message is from the [employer]--    COVID-19 Universal Screening: N/A - Not about scheduling    General Patient Message      Reason for Call: PATIENT WOULD [address] OF HIS PORTABLE OXYGEN  TO  [address] [place] [address] MVJ:[phone]. HE WOULD LIKE A CALL FROM THE OFFICE ONCE THIS IS COMPLETE. Caller Information       Type Contact Phone    04/13/2021 02:35 PM CDT Phone (Incoming) Avelino Johnson (Self) [phone] (H)          Alternative phone number: N/A    Turnaround time given to caller: ""This message will be sent to [facility] Provider's name]. The clinical team will fulfill your request as soon as they review your message. \""    " DISPLAY PLAN FREE TEXT

## 2021-12-26 NOTE — PATIENT PROFILE ADULT - FALL HARM RISK - HARM RISK INTERVENTIONS
Assistance with ambulation/Assistance OOB with selected safe patient handling equipment/Communicate Risk of Fall with Harm to all staff/Reinforce activity limits and safety measures with patient and family/Sit up slowly, dangle for a short time, stand at bedside before walking/Tailored Fall Risk Interventions/Visual Cue: Yellow wristband and red socks/Bed in lowest position, wheels locked, appropriate side rails in place/Call bell, personal items and telephone in reach/Instruct patient to call for assistance before getting out of bed or chair/Non-slip footwear when patient is out of bed/[place] to call system/Physically safe environment - no spills, clutter or unnecessary equipment/Purposeful Proactive Rounding/Room/bathroom lighting operational, light cord in reach

## 2021-12-26 NOTE — ED PROVIDER NOTE - CLINICAL SUMMARY MEDICAL DECISION MAKING FREE TEXT BOX
Guille - Pt is a 80 yo M with a h/o prostate CA, HLD, HTN who presents with emesis, found down by EMS. broad workup, infectious vs metabolic most likely. Will check blood work, cxr, head ct, ua, uc, covid

## 2021-12-26 NOTE — ED ADULT NURSE REASSESSMENT NOTE - COMFORT CARE
plan of care explained/side rails up/wait time explained/warm blanket provided plan of care explained/repositioned/side rails up/warm blanket provided

## 2021-12-26 NOTE — ED PROVIDER NOTE - OBJECTIVE STATEMENT
Pt is a 82 yo M with a h/o prostate CA, HLD, HTN who presents with emesis, found down by EMS. Unclear what happened, pt was found covered in stool and vomiting. Pt does not remember what happened. denies any pain, SOB, chest pain, fevers, chills. Endorses mild abd pain. Pt is a 82 yo M with a h/o prostate CA, HLD, HTN who presents with emesis and dizziness, found down by EMS. pt says that yesterday evening he felt dizzy so he lay down but was unable to get up again due to dizziness. He waited to call his daughter until this am bc he hoped it would resolve. daughter called ems. pt was found covered in stool and vomiting. denies any pain, SOB, chest pain, fevers, chills. Endorses mild abd pain.

## 2021-12-26 NOTE — H&P ADULT - PROBLEM SELECTOR PLAN 8
Hep sq for dvt ppx   PT eval   D/w daughter 12/26 updated on full plan of care   Daughter thinks pt may have a MOLST form at home, to check/bring it in

## 2021-12-26 NOTE — ED PROVIDER NOTE - NSICDXPASTSURGICALHX_GEN_ALL_CORE_FT
PAST SURGICAL HISTORY:  H/O carotid endarterectomy     H/O prostatectomy     H/O renal artery stenosis s/p stent in Left RA    Status post cataract extraction, unspecified laterality

## 2021-12-26 NOTE — ED ADULT NURSE NOTE - NSIMPLEMENTINTERV_GEN_ALL_ED
Implemented All Fall Risk Interventions:  Joes to call system. Call bell, personal items and telephone within reach. Instruct patient to call for assistance. Room bathroom lighting operational. Non-slip footwear when patient is off stretcher. Physically safe environment: no spills, clutter or unnecessary equipment. Stretcher in lowest position, wheels locked, appropriate side rails in place. Provide visual cue, wrist band, yellow gown, etc. Monitor gait and stability. Monitor for mental status changes and reorient to person, place, and time. Review medications for side effects contributing to fall risk. Reinforce activity limits and safety measures with patient and family.

## 2021-12-26 NOTE — H&P ADULT - PROBLEM SELECTOR PLAN 3
Renal artery stenosis s/p left ptra  Cr at baseline  Avoid nephrotoxins  Trend cr  C/w calcitrol 0.25 mwf

## 2021-12-26 NOTE — ED ADULT NURSE REASSESSMENT NOTE - NS ED NURSE REASSESS COMMENT FT1
Blood pressure elevated (182/102), pt asymptomatic. Admitting doc at bedside, states will order pt's home antihypertensive.

## 2021-12-26 NOTE — H&P ADULT - HISTORY OF PRESENT ILLNESS
81M with hx prostate CA, HLD, HTN, PAD, renal artery stenosis s/p left ptra, CKD stage 3-4, hx of vertigo who presents from home with emesis and dizziness. History and collateral also obtained from pt's daughter over the phone. Per daughter family had a get together last night, pt had leftovers, salad, artichoke pie, no alcohol. Around 2am pt woke up with dizziness described as room spinning, nausea, fatigue, and feeling of malaise. Pt lives at home alone, uses a walker. Daughter went to his house the next morning as he felt weak. Daughter called EMS and when EMS tried to sit him up he had an episode of nbnb emesis. Pt reports the episode was similar to his prior episode of vertigo that happened 8 years ago, pt has never seen a neurologist. Denies chest pain, sob, palpitations, fevers, chills, loc during the episode. No known sick contacts or travel history.

## 2021-12-26 NOTE — H&P ADULT - PROBLEM SELECTOR PLAN 4
BP elevated on admission, pt did not take his meds this morning   Start home med norvasc 5mg, coreg 25mg bid

## 2021-12-26 NOTE — H&P ADULT - PROBLEM SELECTOR PLAN 1
Pt with emesis, weakness, nausea, vomiting now improved  ?Gastroenteritis from food   Troponins negative, EKG without st changes   CT A/P negative for SBO, with colonic diverticulosis, no acute changes   LFTs wnl, lipase normal   No diarrhea, ua, cxr negative for infectious etiology   Clears diet, advance as tolerated   IVF NS @ 60cc/hr   Send blood cultures  Trend wbc, temp curve, cmp Pt with emesis, weakness, nausea, vomiting now improved  ?Gastroenteritis from food   Troponins negative, EKG without st changes   CT A/P negative for SBO, with colonic diverticulosis, no acute changes   LFTs wnl, lipase normal   No diarrhea, ua, cxr negative for infectious etiology   Clears diet, advance as tolerated   IVF NS @ 65cc/hr   Send blood cultures  Trend wbc, temp curve, cmp

## 2021-12-26 NOTE — PATIENT PROFILE ADULT - FUNCTIONAL ASSESSMENT - BASIC MOBILITY 3.
Aware of PMR referral from [facility]. POD #4 s/p elective lumbar fusion posterior L3-5 redo - lumbar decompression.    2 = A lot of assistance

## 2021-12-26 NOTE — ED ADULT NURSE NOTE - OBJECTIVE STATEMENT
Pt A&OX4, NAD noted. BIBEMS for N/V and dizziness. Pt reports that he felt nauseous last night so he laid on the floor, denies fall/head injury/LOC. As per EMS, pt was found on the ground in vomit and stool. Pt endorses mild abdominal pain at this time. Respirations non-labored and easy. Abdomen softly distended. Skin cool, dry, color normal for race. Incontinence care performed. Warm blankets and socks provided. Plan of care discussed. Safety maintained. ED workup in progress.

## 2021-12-26 NOTE — ED ADULT TRIAGE NOTE - BMI (KG/M2)
[facility] Hospitalist Progress Note    Date of admission: 10/30/2020    Subjective: Speech and language continue to improve, though she is still nervous about swallowing.    Medications Prior to Admission   Medication Sig Dispense Refill   • acetaminophen (TYLENOL) 325 MG tablet Take 650 mg by mouth every 6 hours as needed for Pain or Fever.     • ascorbic acid (VITAMIN C) 250 MG tablet Take 250 mg by mouth daily.     • amoxicillin-clavulanate (AUGMENTIN) 875-125 MG per tablet Take 1 tablet by mouth 2 times daily. For UTI     • VITAMIN D, CHOLECALCIFEROL, PO Take 1.25 mg by mouth every 7 days. On Friday     • furosemide (LASIX) 20 MG tablet Take 20 mg by mouth daily.     • guaifenesin 100 MG/5ML Take 100 mg by mouth every 4 hours as needed (cough).     • hydroCORTisone (CORTIZONE) 2.5 % cream Apply 1 application topically 2 times daily. RUE     • loratadine (CLARITIN) 10 MG tablet Take 10 mg by mouth daily.     • losartan (COZAAR) 100 MG tablet Take 100 mg by mouth daily.     • Multiple Vitamins-Minerals (MULTIVITAL-M PO) Take 1 tablet by mouth daily.     • HYDROcodone-acetaminophen (NORCO) 5-325 MG per tablet Take 1 tablet by mouth every 8 hours as needed for Pain.     • oxybutynin (DITROPAN-XL) 5 MG 24 hr tablet Take 5 mg by mouth daily.     • potassium CHLORIDE (KLOR-CON M) 20 MEQ natalia ER tablet Take 20 mEq by mouth daily.     • pyridostigmine (MESTINON) 60 MG/5ML solution Take 90 mg by mouth 3 times daily.     • sildenafil (REVATIO) 20 MG tablet Take 20 mg by mouth daily.     • sotalol (BETAPACE) 80 MG tablet Take 40 mg by mouth 2 times daily.     • Cranberry-Vitamin C-Inulin (UTI-STAT PO) Take 30 mLs by mouth daily.     • Rhopressa 0.02 % Solution Place 1 drop into both eyes.        Current Facility-Administered Medications   Medication Dose Route Frequency Provider Last Rate Last Dose   • guaiFENesin-DM) (ROBITUSSIN DM) 100-10 MG/5ML syrup 10 mL  10 mL Oral Q4H PRN Carmela Funez MD       • ammonium lactate  (AMLACTIN) 12 % lotion   Topical PRN Carmela Funez MD       • NON FORMULARY    Daily Carmela Funez MD       • predniSONE (DELTASONE) tablet 60 mg  60 mg Oral Daily with breakfast Hubert Nichols MD   60 mg at 11/04/20 0902   • ketotifen (ZADITOR) 0.025 % ophthalmic solution 1 drop  1 drop Both Eyes BID PRN Hubert Nichols MD       • dextrose 50 % injection 25 g  25 g Intravenous PRN Carmela Funez MD       • dextrose 50 % injection 12.5 g  12.5 g Intravenous PRN Carmela Funez MD   50 mL at 11/01/20 0853   • glucagon (GLUCAGEN) injection 1 mg  1 mg Intramuscular PRN Carmela Funez MD       • dextrose (GLUTOSE) 40 % gel 15 g  15 g Oral PRN Carmela Funez MD       • dextrose (GLUTOSE) 40 % gel 30 g  30 g Oral PRN Carmela Funez MD       • potassium CHLORIDE (KLOR-CON) packet 20 mEq  20 mEq Oral Daily with breakfast Nain Hoffman MD   20 mEq at 11/04/20 0901   • latanoprost (XALATAN) 0.005 % ophthalmic solution 1 drop  1 drop Both Eyes Nightly Caitlin Purcell MD   1 drop at 11/03/20 2141   • oxybutynin (DITROPAN) tablet 5 mg  5 mg Oral Daily Caitlin Purcell MD   5 mg at 11/04/20 0901   • timolol (TIMOPTIC) 0.25 % ophthalmic solution 1 drop  1 drop Left Eye BID Caitlin Purcell MD   1 drop at 11/04/20 0902   • hydrALAZINE (APRESOLINE) injection 10 mg  10 mg Intravenous Q6H PRN Corry Tomas MD   10 mg at 11/02/20 0846   • metoPROLOL (LOPRESSOR) injection 5 mg  5 mg Intravenous Q6H PRN Caitlin Purcell MD       • acetaminophen (TYLENOL) suppository 650 mg  650 mg Rectal Q24H Asha Rees MD   650 mg at 11/03/20 2103   • pyridostigmine (REGONOL) injection 2 mg  2 mg Intravenous TID Asha Rees MD   2 mg at 11/04/20 0901   • sodium chloride 0.9 % flush bag 25 mL  25 mL Intravenous PRN Carmela Funez MD       • sodium chloride (PF) 0.9 % injection 2 mL  2 mL Intracatheter 2 times per day Carmela Funez MD   2 mL at 11/04/20 0905   • furosemide (LASIX)  tablet 20 mg  20 mg Oral Daily Nain Hoffman MD   20 mg at 11/04/20 0902   • losartan (COZAAR) tablet 100 mg  100 mg Oral Daily Nain Hoffman MD   100 mg at 11/04/20 0901   • [Held by provider] sotalol (BETAPACE) tablet 40 mg  40 mg Oral BID Nain Hoffman MD   Stopped at 10/31/20 0900   • acetaminophen (TYLENOL) tablet 650 mg  650 mg Oral Q6H PRN Nain Hoffman MD       • ascorbic acid (VITAMIN C) tablet 250 mg  250 mg Oral Daily Nain Hoffman MD   250 mg at 11/04/20 0901   • hydroCORTisone (CORTIZONE) 2.5 % cream 1 application  1 application Topical BID Nain Hoffman MD   1 application at 11/04/20 0902   • enoxaparin (LOVENOX) injection 40 mg  40 mg Subcutaneous Daily Nain Hoffman MD   40 mg at 11/04/20 0901   • sildenafil (REVATIO) tablet 20 mg  20 mg Oral Daily Nain Hoffman MD   20 mg at 11/04/20 0900       Objective:  Visit Vitals  BP (!) 154/105 (BP Location: RUE - Right upper extremity)   Pulse 94   Temp 97.3 °F (36.3 °C) (Oral)   Resp 16   Ht 5' 7\" (1.702 m)   Wt 76 kg (167 lb 8.8 oz)   SpO2 96%   BMI 26.24 kg/m²       I/O's    Intake/Output Summary (Last 24 hours) at 11/4/2020 1332  Last data filed at 11/3/2020 1800  Gross per 24 hour   Intake 200 ml   Output 150 ml   Net 50 ml     Physical Exam  Constitutional:       Appearance: Normal appearance.   Cardiovascular:      Rate and Rhythm: Normal rate and regular rhythm.      Pulses: Normal pulses.      Heart sounds: Normal heart sounds. No murmur. No friction rub. No gallop.    Pulmonary:      Breath sounds: Normal breath sounds. No wheezing, rhonchi or rales.   Abdominal:      General: Abdomen is flat. Bowel sounds are normal. There is no distension.      Palpations: Abdomen is soft.      Tenderness: There is no abdominal tenderness. There is no guarding.   Musculoskeletal: Normal range of motion.         General: No tenderness or deformity.   Skin:     General: Skin is warm and dry.      Capillary  Refill: Capillary refill takes less than 2 seconds.      Findings: No rash.      Comments: Large sacral decubitus ulcer   Neurological:      Mental Status: She is alert and oriented to person, place, and time.      Sensory: No sensory deficit.      Motor: No weakness.      Comments: Strength 3+/5  of lower extremities, upper extremities 4+/5   Psychiatric:         Mood and Affect: Mood normal.         Behavior: Behavior normal.         Thought Content: Thought content normal.         Judgment: Judgment normal.         Labs     Recent Results (from the past 24 hour(s))   CBC with Automated Differential    Collection Time: 11/04/20  4:48 AM   Result Value Ref Range    WBC 3.9 (L) 4.2 - 11.0 K/mcL    RBC 3.46 (L) 4.00 - 5.20 mil/mcL    HGB 8.9 (L) 12.0 - 15.5 g/dL    HCT 29.8 (L) 36.0 - 46.5 %    MCV 86.1 78.0 - 100.0 fl    MCH 25.7 (L) 26.0 - 34.0 pg    MCHC 29.9 (L) 32.0 - 36.5 g/dL    RDW-CV 16.1 (H) 11.0 - 15.0 %     140 - 450 K/mcL    NRBC 0 0 /100 WBC    DIFF TYPE AUTOMATED DIFFERENTIAL     Neutrophil 66 %    LYMPH 26 %    MONO 8 %    EOSIN 0 %    BASO 0 %    Percent Immature Granuloctyes 0 %    Absolute Neutrophil 2.5 1.8 - 7.7 K/mcL    Absolute Lymph 1.0 1.0 - 4.0 K/mcL    Absolute Mono 0.3 0.3 - 0.9 K/mcL    Absolute Eos 0.0 (L) 0.1 - 0.5 K/mcL    Absolute Baso 0.0 0.0 - 0.3 K/mcL    Absolute Immature Granulocytes 0.0 0 - 0.2 K/mcl   Comprehensive Metabolic Panel    Collection Time: 11/04/20  4:48 AM   Result Value Ref Range    Sodium 142 135 - 145 mmol/L    Potassium 3.6 3.4 - 5.1 mmol/L    Chloride 110 (H) 98 - 107 mmol/L    Carbon Dioxide 27 21 - 32 mmol/L    Anion Gap 9 (L) 10 - 20 mmol/L    Glucose 118 (H) 65 - 99 mg/dL    BUN 8 6 - 20 mg/dL    Creatinine 0.44 (L) 0.51 - 0.95 mg/dL    GFR Estimate,  >90     GFR Estimate, Non African American >90     BUN/Creatinine Ratio 18 7 - 25    CALCIUM 7.9 (L) 8.4 - 10.2 mg/dL    TOTAL BILIRUBIN 0.2 0.2 - 1.0 mg/dL    AST/SGOT 14 <38 Units/L     ALT/SGPT 10 <64 Units/L    ALK PHOSPHATASE 101 45 - 117 Units/L    TOTAL PROTEIN 7.0 6.4 - 8.2 g/dL    Albumin 2.0 (L) 3.6 - 5.1 g/dL    GLOBULIN 5.0 (H) 2.0 - 4.0 g/dL    A/G Ratio, Serum 0.4 (L) 1.0 - 2.4   Metered blood glucose    Collection Time: 11/04/20  7:57 AM   Result Value Ref Range    Glucose Bedside POC 90 70 - 99 mg/dL   Metered blood glucose    Collection Time: 11/04/20 12:10 PM   Result Value Ref Range    Glucose Bedside  (H) 70 - 99 mg/dL     Microbiology Results  (Last 10 results in the past 7 days)    Specimen   Gram Smear   Culture Result   Status      10/30/20  1834   10/30/20  1834  10/30/20  1834     URINE, CLEAN CATCH/MIDSTREAM   NO GROWTH. 11/01/2020 FINAL    10/30/20  1730   10/30/20  1730  10/30/20  1730     BLOOD BLOOD   NO GROWTH 3 DAYS. PENDING    10/30/20  1640   10/30/20  1640  10/30/20  1640     BLOOD BLOOD   NO GROWTH 3 DAYS. PENDING            Imaging  No results found.         Assessment/Plan:  78-year-old female with history of myasthenia gravis, pulmonary hypertension, paroxysmal atrial fibrillation who presented from her nursing home with confusion and decreased ability to swallow.  #.  Acute myasthenia gravis exacerbation.  The patient is now improving after initially getting neostigmine and IVIG.  She has passed a swallow eval for dysphagia level 2 diet with nectar thick liquids.  May ultimately still benefit from a PEG.  PT/OT/ST. Neurology also recommended adding prednisone, so that was done  #.  Recent VRE UTI.  She completed antibiotics.  Repeat urine culture negative.  #.  Pulmonary hypertension.  Continue sildenafil  #.  Paroxysmal atrial fibrillation.  We were holding sotalol due to prolonged QTC and inability to swallow.  Likely need to resume an antiarrhythmic.  #.  Hypertension with hypertensive heart disease.  Continue losartan  #.  Large sacral decubitus ulcer.  Due to quadriplegia from myasthenia gravis.  Dr. Jiménez from wound care is seeing.  She would  likely benefit from a diverging colostomy, and is contemplating this.  #.  Anemia.  Likely inflammatory anemia.  #.  Severe protein calorie malnutrition.  Encourage oral supplements    DVT Prophylaxis   Lovenox    Consultants  IP Consult Orders (From admission, onward)     Start     Ordered    11/02/20 1453  Inpatient Consult to Wound Care Medical Provider  (IP consult to Wound Care Medical Provider Panel)  ONE TIME     Provider:  Kyle Fontaine DO    11/02/20 1452    11/02/20 1341  Inpatient consult to General Surgery  ONE TIME     Provider:  Deep Cook MD    11/02/20 1342    10/31/20 1449  Inpatient consult to Infectious Diseases  ONE TIME     Provider:  Staci Mendez MD    10/31/20 1449    10/30/20 2214  Inpatient consult to Neurology  ONE TIME     Provider:  Jimmie Escalera MD    10/30/20 2214                Primary Care Physician:  MD PEDRO Pulliam MD  [facility] Hospitalist   29.8

## 2021-12-26 NOTE — H&P ADULT - PROBLEM SELECTOR PLAN 2
Pt with prior hx of vertigo, current episode of dizziness consistent with ?bpv  Troponins negative, EKG without st changes   Denies chest pain, no syncope   Check orthostatics   IVF  Dizziness has improved on exam   Neuro eval as outpt

## 2021-12-26 NOTE — ED PROVIDER NOTE - ATTENDING CONTRIBUTION TO CARE
81 M w/ hx of prostate CA, HLD, HTN here after being found down by EMS, pt complaining of n/v and dizziness, he states that last night he was dizzy so he laid down on the ground and this AM was unable to stand up, pt states he called daughter in AM, pt daughter called EMS, pt denies any cp, no head trauma, no fevers, no chills, when EMS arrived, pt in stool and emesis. Pt upon arrival ot the ER is aaox3, he is holding a blanket over his face, he doesn't want to change positions due to intense dizziness, he has clear lungs soft abdomen 5/5 upper and lower extremity strenght, no facial droop, no nystagmus pupils are 3 mm and reactive bilaterally, plan for labs imaging and reassessment, when pt reassessed still w/ persistent sx, but improving, will admit for cardiac eitiology ECHO and further monitoring, ?neurology consult, pt given tx for possible peripheral eitiology of dizziness. EKG nonischemic,

## 2021-12-26 NOTE — ED PROVIDER NOTE - PHYSICAL EXAMINATION
Cheryle Han MD  GENERAL: Patient awake alert NAD.  HEENT: NC/AT, Moist mucous membranes, PERRL, EOMI.  LUNGS: CTAB, no wheezes or crackles.   CARDIAC: RRR, no m/r/g.    ABDOMEN: Soft, NT, ND, No rebound, guarding. No CVA tenderness.   EXT: No edema. No calf tenderness.   MSK: No spinal tenderness, no pain with movement, no deformities.  NEURO: A&Ox3. Moving all extremities. cn 2-12 intact. 5/5 strength bilat  SKIN: Warm and dry. No rash.  PSYCH: Normal affect.

## 2021-12-26 NOTE — H&P ADULT - ASSESSMENT
81M with hx prostate CA, HLD, HTN, PAD, renal artery stenosis s/p left ptra, CKD stage 3-4, hx of vertigo who presents from home with emesis and dizziness

## 2021-12-27 LAB
A1C WITH ESTIMATED AVERAGE GLUCOSE RESULT: 6 % — HIGH (ref 4–5.6)
ALBUMIN SERPL ELPH-MCNC: 4 G/DL — SIGNIFICANT CHANGE UP (ref 3.3–5)
ALP SERPL-CCNC: 59 U/L — SIGNIFICANT CHANGE UP (ref 40–120)
ALT FLD-CCNC: 7 U/L — LOW (ref 10–45)
AMYLASE P1 CFR SERPL: 33 U/L — SIGNIFICANT CHANGE UP (ref 25–125)
ANION GAP SERPL CALC-SCNC: 12 MMOL/L — SIGNIFICANT CHANGE UP (ref 5–17)
AST SERPL-CCNC: 8 U/L — LOW (ref 10–40)
BASOPHILS # BLD AUTO: 0.02 K/UL — SIGNIFICANT CHANGE UP (ref 0–0.2)
BASOPHILS NFR BLD AUTO: 0.3 % — SIGNIFICANT CHANGE UP (ref 0–2)
BILIRUB SERPL-MCNC: 0.8 MG/DL — SIGNIFICANT CHANGE UP (ref 0.2–1.2)
BUN SERPL-MCNC: 28 MG/DL — HIGH (ref 7–23)
CALCIUM SERPL-MCNC: 8.8 MG/DL — SIGNIFICANT CHANGE UP (ref 8.4–10.5)
CHLORIDE SERPL-SCNC: 109 MMOL/L — HIGH (ref 96–108)
CO2 SERPL-SCNC: 20 MMOL/L — LOW (ref 22–31)
CREAT SERPL-MCNC: 1.75 MG/DL — HIGH (ref 0.5–1.3)
CULTURE RESULTS: SIGNIFICANT CHANGE UP
EOSINOPHIL # BLD AUTO: 0.13 K/UL — SIGNIFICANT CHANGE UP (ref 0–0.5)
EOSINOPHIL NFR BLD AUTO: 1.8 % — SIGNIFICANT CHANGE UP (ref 0–6)
ESTIMATED AVERAGE GLUCOSE: 126 MG/DL — HIGH (ref 68–114)
GLUCOSE SERPL-MCNC: 143 MG/DL — HIGH (ref 70–99)
HCT VFR BLD CALC: 30.6 % — LOW (ref 39–50)
HGB BLD-MCNC: 10.4 G/DL — LOW (ref 13–17)
IMM GRANULOCYTES NFR BLD AUTO: 0.7 % — SIGNIFICANT CHANGE UP (ref 0–1.5)
LIDOCAIN IGE QN: 25 U/L — SIGNIFICANT CHANGE UP (ref 7–60)
LYMPHOCYTES # BLD AUTO: 0.82 K/UL — LOW (ref 1–3.3)
LYMPHOCYTES # BLD AUTO: 11.3 % — LOW (ref 13–44)
MAGNESIUM SERPL-MCNC: 2 MG/DL — SIGNIFICANT CHANGE UP (ref 1.6–2.6)
MCHC RBC-ENTMCNC: 34 GM/DL — SIGNIFICANT CHANGE UP (ref 32–36)
MCHC RBC-ENTMCNC: 37.7 PG — HIGH (ref 27–34)
MCV RBC AUTO: 110.9 FL — HIGH (ref 80–100)
MONOCYTES # BLD AUTO: 0.42 K/UL — SIGNIFICANT CHANGE UP (ref 0–0.9)
MONOCYTES NFR BLD AUTO: 5.8 % — SIGNIFICANT CHANGE UP (ref 2–14)
NEUTROPHILS # BLD AUTO: 5.84 K/UL — SIGNIFICANT CHANGE UP (ref 1.8–7.4)
NEUTROPHILS NFR BLD AUTO: 80.1 % — HIGH (ref 43–77)
NRBC # BLD: 0 /100 WBCS — SIGNIFICANT CHANGE UP (ref 0–0)
PHOSPHATE SERPL-MCNC: 3.5 MG/DL — SIGNIFICANT CHANGE UP (ref 2.5–4.5)
PLATELET # BLD AUTO: 171 K/UL — SIGNIFICANT CHANGE UP (ref 150–400)
POTASSIUM SERPL-MCNC: 3.7 MMOL/L — SIGNIFICANT CHANGE UP (ref 3.5–5.3)
POTASSIUM SERPL-SCNC: 3.7 MMOL/L — SIGNIFICANT CHANGE UP (ref 3.5–5.3)
PROT SERPL-MCNC: 6.4 G/DL — SIGNIFICANT CHANGE UP (ref 6–8.3)
RBC # BLD: 2.76 M/UL — LOW (ref 4.2–5.8)
RBC # FLD: 17.6 % — HIGH (ref 10.3–14.5)
SODIUM SERPL-SCNC: 141 MMOL/L — SIGNIFICANT CHANGE UP (ref 135–145)
SPECIMEN SOURCE: SIGNIFICANT CHANGE UP
TSH SERPL-MCNC: 2.06 UIU/ML — SIGNIFICANT CHANGE UP (ref 0.27–4.2)
WBC # BLD: 7.28 K/UL — SIGNIFICANT CHANGE UP (ref 3.8–10.5)
WBC # FLD AUTO: 7.28 K/UL — SIGNIFICANT CHANGE UP (ref 3.8–10.5)

## 2021-12-27 PROCEDURE — 99223 1ST HOSP IP/OBS HIGH 75: CPT

## 2021-12-27 PROCEDURE — 99233 SBSQ HOSP IP/OBS HIGH 50: CPT

## 2021-12-27 RX ORDER — SODIUM CHLORIDE 9 MG/ML
1000 INJECTION, SOLUTION INTRAVENOUS
Refills: 0 | Status: DISCONTINUED | OUTPATIENT
Start: 2021-12-27 | End: 2022-01-02

## 2021-12-27 RX ADMIN — SODIUM CHLORIDE 75 MILLILITER(S): 9 INJECTION, SOLUTION INTRAVENOUS at 15:28

## 2021-12-27 RX ADMIN — Medication 40 MILLIGRAM(S): at 05:02

## 2021-12-27 RX ADMIN — HEPARIN SODIUM 5000 UNIT(S): 5000 INJECTION INTRAVENOUS; SUBCUTANEOUS at 05:02

## 2021-12-27 RX ADMIN — ONDANSETRON 4 MILLIGRAM(S): 8 TABLET, FILM COATED ORAL at 17:05

## 2021-12-27 RX ADMIN — CALCITRIOL 0.25 MICROGRAM(S): 0.5 CAPSULE ORAL at 05:02

## 2021-12-27 RX ADMIN — HEPARIN SODIUM 5000 UNIT(S): 5000 INJECTION INTRAVENOUS; SUBCUTANEOUS at 15:28

## 2021-12-27 RX ADMIN — CLOPIDOGREL BISULFATE 75 MILLIGRAM(S): 75 TABLET, FILM COATED ORAL at 12:33

## 2021-12-27 RX ADMIN — HEPARIN SODIUM 5000 UNIT(S): 5000 INJECTION INTRAVENOUS; SUBCUTANEOUS at 21:43

## 2021-12-27 RX ADMIN — AMLODIPINE BESYLATE 5 MILLIGRAM(S): 2.5 TABLET ORAL at 05:02

## 2021-12-27 RX ADMIN — CILOSTAZOL 100 MILLIGRAM(S): 100 TABLET ORAL at 05:02

## 2021-12-27 RX ADMIN — CILOSTAZOL 100 MILLIGRAM(S): 100 TABLET ORAL at 17:05

## 2021-12-27 RX ADMIN — CARVEDILOL PHOSPHATE 25 MILLIGRAM(S): 80 CAPSULE, EXTENDED RELEASE ORAL at 17:05

## 2021-12-27 RX ADMIN — Medication 81 MILLIGRAM(S): at 12:33

## 2021-12-27 RX ADMIN — ATORVASTATIN CALCIUM 40 MILLIGRAM(S): 80 TABLET, FILM COATED ORAL at 21:43

## 2021-12-27 RX ADMIN — CARVEDILOL PHOSPHATE 25 MILLIGRAM(S): 80 CAPSULE, EXTENDED RELEASE ORAL at 05:02

## 2021-12-27 NOTE — PROGRESS NOTE ADULT - SUBJECTIVE AND OBJECTIVE BOX
Vascular Cardiology Consult Note    DIRECT SERVICE NUMBER:  [phone]           EMAIL [email or web address]   OFFICE [phone]    CC:  hx of USHA s/p stents.   HPI:    81M with hx prostate CA, HLD, HTN, PAD, renal artery stenosis s/p left ptra, CKD stage 3-4, hx of vertigo who presents from home with emesis and dizziness. Patient still has some dizziness, but is improved since admission. Vascular cardiology consulted for history of renal stenosis. Patient currently has had no change in his urine output or creatinine. There are no complaint of leg pain, chest pain, dyspnea or extremity swelling.          Allergies    No Known Allergies    Intolerances    	    MEDICATIONS:  amLODIPine   Tablet 5 milliGRAM(s) Oral daily  aspirin enteric coated 81 milliGRAM(s) Oral daily  carvedilol 25 milliGRAM(s) Oral every 12 hours  cilostazol 100 milliGRAM(s) Oral two times a day  clopidogrel Tablet 75 milliGRAM(s) Oral daily  furosemide    Tablet 40 milliGRAM(s) Oral <User Schedule>  heparin   Injectable 5000 Unit(s) SubCutaneous every 8 hours        acetaminophen     Tablet .. 650 milliGRAM(s) Oral every 6 hours PRN  ondansetron Injectable 4 milliGRAM(s) IV Push every 8 hours PRN    aluminum hydroxide/magnesium hydroxide/simethicone Suspension 30 milliLiter(s) Oral every 6 hours PRN  polyethylene glycol 3350 17 Gram(s) Oral daily  senna 2 Tablet(s) Oral at bedtime    atorvastatin 40 milliGRAM(s) Oral at bedtime    calcitriol   Capsule 0.25 MICROGram(s) Oral <User Schedule>  influenza  Vaccine (HIGH DOSE) 0.7 milliLiter(s) IntraMuscular once  sodium chloride 0.9%. 1000 milliLiter(s) IV Continuous <Continuous>      PAST MEDICAL & SURGICAL HISTORY:  HTN - Hypertension    Hypercholesterolemia    PC (prostate cancer)  s/p surgical resection 3 years ago    Gout    H/O prostatectomy    H/O carotid endarterectomy    H/O renal artery stenosis  s/p stent in Left RA    Status post cataract extraction, unspecified laterality        FAMILY HISTORY:      SOCIAL HISTORY:  unchanged    REVIEW OF SYSTEMS:  CONSTITUTIONAL: No fever, weight loss. Fatigue present.   EYES: No eye pain, visual disturbances, or discharge  ENT:  No difficulty hearing, tinnitus; No sinus or throat pain. Vertigo present.   NECK: No pain or stiffness  RESPIRATORY:  no respiratory distress.   CARDIOVASCULAR:  no CP  GASTROINTESTINAL: No abdominal or epigastric pain. No nausea, vomiting, or hematemesis; No diarrhea or constipation. No melena or hematochezia.  GENITOURINARY: No dysuria, frequency, hematuria, or incontinence  NEUROLOGICAL: No headaches, memory loss, loss of strength, numbness, or tremors  SKIN: no rash   LYMPH Nodes: No enlarged glands  ENDOCRINE: No heat or cold intolerance; No hair loss  MUSCULOSKELETAL: No joint pain or swelling; No muscle, back, or extremity pain  PSYCHIATRIC: No depression, anxiety, mood swings, or difficulty sleeping  HEME/LYMPH: No easy bruising, or bleeding gums  ALLERGY AND IMMUNOLOGIC: No hives or eczema	    [ x] All others negative	  [ ] Unable to obtain    PHYSICAL EXAM:  T(C): 36.9 (12-27-21 @ 12:47), Max: 36.9 (12-27-21 @ 12:47)  HR: 69 (12-27-21 @ 12:47) (69 - 108)  BP: 121/70 (12-27-21 @ 12:47) (121/70 - 182/102)  RR: 17 (12-27-21 @ 12:47) (17 - 19)  SpO2: 94% (12-27-21 @ 12:47) (94% - 99%)  Wt(kg): --  I&O's Summary    26 Dec 2021 07:01  -  27 Dec 2021 07:00  --------------------------------------------------------  IN: 0 mL / OUT: 150 mL / NET: -150 mL    27 Dec 2021 07:01  -  27 Dec 2021 13:43  --------------------------------------------------------  IN: 800 mL / OUT: 100 mL / NET: 700 mL        Appearance:  	  HEENT:   Normal oral mucosa, PERRL, EOMI	  Carotid: no brut   Lymphatic: No lymphadenopathy  Cardiovascular:  RRR, (-) murmur  Respiratory: CTAB 	  Psychiatry:  AAO x3   Gastrointestinal:  Soft, Non-tender, + BS	  Skin: No rashes, No ecchymoses, No cyanosis	  Neurologic:  no focal deficit  Extremities:  no edema    Vascular Pulse Exam:  Right DP: [x]palpable []non-palpable []audible      Left DP :   [x]palpable []non-palpable []audible  Right PT: [x]palpable [] non-palpable []audible   Left PT:  [x] palpable [] non-palpable []audible           LABS:	 	    CBC Full  -  ( 27 Dec 2021 07:53 )  WBC Count : 7.28 K/uL  Hemoglobin : 10.4 g/dL  Hematocrit : 30.6 %  Platelet Count - Automated : 171 K/uL  Mean Cell Volume : 110.9 fl  Mean Cell Hemoglobin : 37.7 pg  Mean Cell Hemoglobin Concentration : 34.0 gm/dL  Auto Neutrophil # : 5.84 K/uL  Auto Lymphocyte # : 0.82 K/uL  Auto Monocyte # : 0.42 K/uL  Auto Eosinophil # : 0.13 K/uL  Auto Basophil # : 0.02 K/uL  Auto Neutrophil % : 80.1 %  Auto Lymphocyte % : 11.3 %  Auto Monocyte % : 5.8 %  Auto Eosinophil % : 1.8 %  Auto Basophil % : 0.3 %    12-27    141  |  109<H>  |  28<H>  ----------------------------<  143<H>  3.7   |  20<L>  |  1.75<H>  12-26    141  |  104  |  35<H>  ----------------------------<  253<H>  4.4   |  22  |  1.88<H>    Ca    8.8      27 Dec 2021 07:50  Ca    9.4      26 Dec 2021 11:49  Phos  3.5     12-27  Mg     2.0     12-27  Mg     2.3     12-26    TPro  6.4  /  Alb  4.0  /  TBili  0.8  /  DBili  x   /  AST  8<L>  /  ALT  7<L>  /  AlkPhos  59  12-27  TPro  7.5  /  Alb  5.0  /  TBili  0.8  /  DBili  x   /  AST  12  /  ALT  11  /  AlkPhos  71  12-26

## 2021-12-27 NOTE — PHYSICAL THERAPY INITIAL EVALUATION ADULT - ADDITIONAL COMMENTS
Pt ambulates independently w/o device in home. Will use straight cane for distance. Independent w/ ADLs.

## 2021-12-27 NOTE — PHYSICAL THERAPY INITIAL EVALUATION ADULT - IMPAIRMENTS FOUND, PT EVAL
aerobic capacity/endurance/gait, locomotion, and balance aerobic capacity/endurance/gait, locomotion, and balance/gross motor/muscle strength

## 2021-12-27 NOTE — PHYSICAL THERAPY INITIAL EVALUATION ADULT - PLANNED THERAPY INTERVENTIONS, PT EVAL
LTG 1: Stairs - Pt will be independent with negotiation of 6 steps within 4 weeks./balance training/bed mobility training/gait training/transfer training

## 2021-12-27 NOTE — PROGRESS NOTE ADULT - SUBJECTIVE AND OBJECTIVE BOX
PROGRESS NOTE:   Radha Mcclendon DO  Hospitalist  Pager [phone]  After 5pm/weekends or if no answer ext: [phone]      Patient is a 81y old  Male who presents with a chief complaint of Dizziness, nausea/vomiting (27 Dec 2021 13:42)      SUBJECTIVE / OVERNIGHT EVENTS:  Still having some nausea but no more vomiting.  Mild headache    ADDITIONAL REVIEW OF SYSTEMS:  no fever or chills    MEDICATIONS  (STANDING):  amLODIPine   Tablet 5 milliGRAM(s) Oral daily  aspirin enteric coated 81 milliGRAM(s) Oral daily  atorvastatin 40 milliGRAM(s) Oral at bedtime  calcitriol   Capsule 0.25 MICROGram(s) Oral <User Schedule>  carvedilol 25 milliGRAM(s) Oral every 12 hours  cilostazol 100 milliGRAM(s) Oral two times a day  clopidogrel Tablet 75 milliGRAM(s) Oral daily  furosemide    Tablet 40 milliGRAM(s) Oral <User Schedule>  heparin   Injectable 5000 Unit(s) SubCutaneous every 8 hours  influenza  Vaccine (HIGH DOSE) 0.7 milliLiter(s) IntraMuscular once  lactated ringers. 1000 milliLiter(s) (75 mL/Hr) IV Continuous <Continuous>  polyethylene glycol 3350 17 Gram(s) Oral daily  senna 2 Tablet(s) Oral at bedtime    MEDICATIONS  (PRN):  acetaminophen     Tablet .. 650 milliGRAM(s) Oral every 6 hours PRN Temp greater or equal to 38C (100.4F), Mild Pain (1 - 3)  aluminum hydroxide/magnesium hydroxide/simethicone Suspension 30 milliLiter(s) Oral every 6 hours PRN Dyspepsia  ondansetron Injectable 4 milliGRAM(s) IV Push every 8 hours PRN Nausea and/or Vomiting      CAPILLARY BLOOD GLUCOSE        I&O's Summary    26 Dec 2021 07:  -  27 Dec 2021 07:00  --------------------------------------------------------  IN: 0 mL / OUT: 150 mL / NET: -150 mL    27 Dec 2021 07:  -  27 Dec 2021 16:49  --------------------------------------------------------  IN: 800 mL / OUT: 100 mL / NET: 700 mL        PHYSICAL EXAM:  Vital Signs Last 24 Hrs  T(C): 36.9 (27 Dec 2021 12:47), Max: 36.9 (27 Dec 2021 12:47)  T(F): 98.5 (27 Dec 2021 12:47), Max: 98.5 (27 Dec 2021 12:47)  HR: 74 (27 Dec 2021 14:05) (69 - 108)  BP: 131/71 (27 Dec 2021 14:05) (121/70 - 182/102)  BP(mean): --  RR: 17 (27 Dec 2021 12:47) (17 - 19)  SpO2: 95% (27 Dec 2021 14:05) (94% - 99%)    CONSTITUTIONAL: NAD, well-developed; non toxic appearing; answering questions  RESPIRATORY: Normal respiratory effort; lungs are clear to auscultation bilaterally  CARDIOVASCULAR: Regular rate and rhythm, normal S1 and S2, no murmur/rub/gallop; No lower extremity edema; Peripheral pulses are 2+ bilaterally  ABDOMEN: Nontender to palpation, normoactive bowel sounds, no rebound/guarding; No hepatosplenomegaly  MUSCLOSKELETAL: no clubbing or cyanosis of digits; no joint swelling or tenderness to palpation  PSYCH: A+O to person, place, and time; affect appropriate    LABS:                        10.4   7.28  )-----------( 171      ( 27 Dec 2021 07:53 )             30.6         141  |  109<H>  |  28<H>  ----------------------------<  143<H>  3.7   |  20<L>  |  1.75<H>    Ca    8.8      27 Dec 2021 07:50  Phos  3.5       Mg     2.0         TPro  6.4  /  Alb  4.0  /  TBili  0.8  /  DBili  x   /  AST  8<L>  /  ALT  7<L>  /  AlkPhos  59            Urinalysis Basic - ( 26 Dec 2021 13:19 )    Color: Light Yellow / Appearance: Clear / S.013 / pH: x  Gluc: x / Ketone: Negative  / Bili: Negative / Urobili: Negative   Blood: x / Protein: 100 / Nitrite: Negative   Leuk Esterase: Negative / RBC: 1 /hpf / WBC 2 /HPF   Sq Epi: x / Non Sq Epi: 1 /hpf / Bacteria: Negative          RADIOLOGY & ADDITIONAL TESTS:  Results Reviewed:   Imaging Personally Reviewed:  Electrocardiogram Personally Reviewed:    COORDINATION OF CARE:  Care Discussed with Consultants/Other Providers [Y/N]:  Prior or Outpatient Records Reviewed [Y/N]:

## 2021-12-27 NOTE — CONSULT NOTE ADULT - SUBJECTIVE AND OBJECTIVE BOX
HPI: Mr. 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 He presented yesterday to the [facility] ER with nausea fatigue, dizziness, and vertigo for 1 day. He attests to an episode of vertigo 8 years ago; he did not see a neurologist back then. He is well-known to me; I last saw him at my office on 21. I see that overnight he was noted to have a severely elevated BP, at 182/102.      PAST MEDICAL & SURGICAL HISTORY:  HTN - Hypertension  Hypercholesterolemia  PC (prostate cancer) -s/p surgical resection 3 years ago  Gout  CKD 3-4  USHA - atrophic right kidney; L renal artery stent  H/O prostatectomy  H/O carotid endarterectomy  Status post cataract extraction, unspecified laterality      Allergies  No Known Allergies    SOCIAL HISTORY:  Denies ETOh,Smoking,     FAMILY HISTORY:  No CKD    REVIEW OF SYSTEMS:  CONSTITUTIONAL: (+)dizziness, (+)fatigue, (+)malaise  EYES/ENT: No visual changes;  No vertigo or throat pain   NECK: No pain or stiffness  RESPIRATORY: No cough, wheezing, hemoptysis; No shortness of breath  CARDIOVASCULAR: No chest pain or palpitations  GASTROINTESTINAL: (+)nausea, (+) vomiting, no abdominal pain  GENITOURINARY: No dysuria, frequency or hematuria  NEUROLOGICAL: No numbness or weakness  SKIN: No itching, burning, rashes, or lesions   All other review of systems is negative unless indicated above.    VITAL:  T(C): , Max: 36.7 (21 @ 04:36)  T(F): , Max: 98.1 (21 @ 04:36)  HR: 75 (21 @ 04:36)  BP: 129/67 (21 @ 04:36)  RR: 17 (21 @ 04:36)  SpO2: 95% (21 @ 04:36)    PHYSICAL EXAM:  Constitutional: NAD, Alert  HEENT: NCAT, MMM  Neck: Supple, No JVD  Respiratory: CTA-b/l  Cardiovascular: RRR s1s2, no m/r/g  Gastrointestinal: BS+, soft, NT/ND  Extremities: No peripheral edema b/l  Neurological: no focal deficits; strength grossly intact  Back: no CVAT b/l  Skin: No rashes, no nevi    LABS:                        12.2   6.61  )-----------( 176      ( 26 Dec 2021 11:49 )             35.3     Na(141)/K(4.4)/Cl(104)/HCO3(22)/BUN(35)/Cr(1.88)Glu(253)/Ca(9.4)/Mg(2.3)/PO4(--)     @ 11:49    Urinalysis Basic - ( 26 Dec 2021 13:19 )  Color: Light Yellow / Appearance: Clear / S.013 / pH: x  Gluc: x / Ketone: Negative  / Bili: Negative / Urobili: Negative   Blood: x / Protein: 100 / Nitrite: Negative   Leuk Esterase: Negative / RBC: 1 /hpf / WBC 2 /HPF   Sq Epi: x / Non Sq Epi: 1 /hpf / Bacteria: Negative    (21) - BUN 34, Cr 2.29, K 4.2, HCO3 23, Ca 8.9, Alb 4.8, U 9.1, PTH 53, Uprot 5.3mg/dL  (3/22/21) - BUN 33, Cr 1.98, K 4.2, HCO3 23  (21) - BUN 35, Cr 1.87, K 4.4, HCO3 23, eGFR 33  (20) - BUN 37, Cr 2.35, K 4.9, HCO3 23, Hb 9.8, TSat 55, Ferr 279, Uric 7.6, PO4 4.0, PTH 30  (20) - BUN 84, Cr 5.04, K 3.7, HCO3 21; Urine: Na 50, K 16, Cl <35, Cr 77, UA-100prot,mod blood      IMAGING:  < from: CT Abdomen and Pelvis No Cont (21 @ 14:28) >  No bowel obstruction.  Colonic diverticulosis, without associated inflammatory changes.  Status post interval placement of left renal artery stent since 2020.    (20) - renal artery duplex - L 12.1, R 5.7; Left renal artery origin with severe/hemodynamically significant stenosis    (3/18/21) - cardiac cath ==>medical management    (21) - NST - medium mod-severe defects - partially reversible       ASSESSMENT:  (1)Renal - CKD3-4 - due to renal artery stenosis. At/near baseline GFR    (2)Lytes - highly acceptable    (3)CV - hypertension due to USHA - BP high overnight - in setting of his antihypertensives not having been taken at their normally scheduled times? Or due to worsening of his USHA? I doubt that the latter is the case here; his creatinine is at baseline. Were his USHA worsening, I would have expected a bump in his creatinine.    (4)GI - nausea/vomiting - vertigo-associated?    RECOMMEND:  (1)Antihypertensives as taken at home, for now  (2)Antiemetics/Meclizine per primary team  (3)Neuro eval  (4)Dose new meds for GFR ~30ml/min    Thank you for involving [facility] in this patient's care.    With warm regards,    Edis Cabral MD   [facility]  Office: [phone]  Cell: [phone]               HPI: Mr. 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 He presented yesterday to the [facility] ER with nausea, vomiting, night sweats, fatigue, dizziness, and vertigo for 1 day. He attests to an episode of vertigo 8 years ago; he did not see a neurologist back then. He is well-known to me; I last saw him at my office on 21. I see that overnight he was noted to have a severely elevated BP, at 182/102.    Mr. Moya feels somewhat better today than he did 1-2 days ago. He denies any notable urinary changes.    PAST MEDICAL & SURGICAL HISTORY:  HTN - Hypertension  Hypercholesterolemia  PC (prostate cancer) -s/p surgical resection 3 years ago  Gout  CKD 3-4  USHA - atrophic right kidney; L renal artery stent  H/O prostatectomy  H/O carotid endarterectomy  Status post cataract extraction, unspecified laterality      Allergies  No Known Allergies    SOCIAL HISTORY:  Denies ETOh,Smoking,     FAMILY HISTORY:  No CKD    REVIEW OF SYSTEMS:  CONSTITUTIONAL: (+)dizziness, (+)fatigue, (+)malaise, (+)night sweats  EYES/ENT: No visual changes;  No vertigo or throat pain   NECK: No pain or stiffness  RESPIRATORY: No cough, wheezing, hemoptysis; No shortness of breath  CARDIOVASCULAR: No chest pain or palpitations  GASTROINTESTINAL: (+)nausea, (+) vomiting, no abdominal pain  GENITOURINARY: No dysuria, frequency or hematuria  NEUROLOGICAL: No numbness or weakness  SKIN: No itching, burning, rashes, or lesions   All other review of systems is negative unless indicated above.    VITAL:  T(C): , Max: 36.7 (21 @ 04:36)  T(F): , Max: 98.1 (21 @ 04:36)  HR: 75 (21 @ 04:36)  BP: 129/67 (21 @ 04:36)  RR: 17 (21 @ 04:36)  SpO2: 95% (21 @ 04:36)    PHYSICAL EXAM:  Constitutional: NAD, Alert  HEENT: NCAT, DMM  Neck: Supple, No JVD  Respiratory: CTA-b/l  Cardiovascular: RRR s1s2, no m/r/g  Gastrointestinal: BS+, soft, NT/ND  Extremities: No peripheral edema b/l  Neurological: no focal deficits; strength grossly intact  Back: no CVAT b/l  Skin: No rashes, no nevi    LABS:                        12.2   6.61  )-----------( 176      ( 26 Dec 2021 11:49 )             35.3     Na(141)/K(4.4)/Cl(104)/HCO3(22)/BUN(35)/Cr(1.88)Glu(253)/Ca(9.4)/Mg(2.3)/PO4(--)     @ 11:49    Urinalysis Basic - ( 26 Dec 2021 13:19 )  Color: Light Yellow / Appearance: Clear / S.013 / pH: x  Gluc: x / Ketone: Negative  / Bili: Negative / Urobili: Negative   Blood: x / Protein: 100 / Nitrite: Negative   Leuk Esterase: Negative / RBC: 1 /hpf / WBC 2 /HPF   Sq Epi: x / Non Sq Epi: 1 /hpf / Bacteria: Negative    (21) - BUN 34, Cr 2.29, K 4.2, HCO3 23, Ca 8.9, Alb 4.8, U 9.1, PTH 53, Uprot 5.3mg/dL  (3/22/21) - BUN 33, Cr 1.98, K 4.2, HCO3 23  (21) - BUN 35, Cr 1.87, K 4.4, HCO3 23, eGFR 33  (20) - BUN 37, Cr 2.35, K 4.9, HCO3 23, Hb 9.8, TSat 55, Ferr 279, Uric 7.6, PO4 4.0, PTH 30  (20) - BUN 84, Cr 5.04, K 3.7, HCO3 21; Urine: Na 50, K 16, Cl <35, Cr 77, UA-100prot,mod blood      IMAGING:  < from: CT Abdomen and Pelvis No Cont (21 @ 14:28) >  No bowel obstruction.  Colonic diverticulosis, without associated inflammatory changes.  Status post interval placement of left renal artery stent since 2020.    (8/31/20) - renal artery duplex - L 12.1, R 5.7; Left renal artery origin with severe/hemodynamically significant stenosis    (3/18/21) - cardiac cath ==>medical management    (21) - NST - medium mod-severe defects - partially reversible       ASSESSMENT:  (1)Renal - CKD3-4 - due to renal artery stenosis. At/near baseline GFR    (2)Lytes - highly acceptable    (3)CV - hypertension due to USHA - BP high overnight - in setting of his antihypertensives not having been taken at their normally scheduled times? Or due to worsening of his USHA? I doubt that the latter is the case here; his creatinine is at baseline. Were his USHA worsening, I would have expected a bump in his creatinine.    (4)GI/Neuro - nausea, vomiting, vertigo - are these all symptoms due to mild infection/associated hypovolemia? Clinically improving.    RECOMMEND:  (1)Antihypertensives as taken at home, for now  (2)IVF x 24h, as ordered  (3)Antiemetics/Meclizine per primary team  (4)Dose new meds for GFR ~30ml/min    Thank you for involving [facility] in this patient's care.    With warm regards,    Edis Cabral MD   [facility]  Office: [phone]  Cell: [phone]

## 2021-12-27 NOTE — PROGRESS NOTE ADULT - ASSESSMENT
Assessment:  1. hx prostate CA,D  2. HLD  3. HTN  4. PAD  5. Renal artery stenosis s/p left ptra  6. CKD stage 3-4    Plan:  1. Obtain echocardiogram  2. No issues with his renal stents- his renal function is stable.   3. Continue DAPT, atorvastatin and cilostazol at current doses.       Thank you  Vascular Cardiology Service  Please call with any questions:   DIRECT SERVICE NUMBER:  [phone]  Office [phone]  email:  [email or web address]

## 2021-12-27 NOTE — PHYSICAL THERAPY INITIAL EVALUATION ADULT - CRITERIA FOR SKILLED THERAPEUTIC INTERVENTIONS
impairments found/functional limitations in following categories impairments found/functional limitations in following categories/anticipated discharge recommendation

## 2021-12-27 NOTE — PHYSICAL THERAPY INITIAL EVALUATION ADULT - PERTINENT HX OF CURRENT PROBLEM, REHAB EVAL
81M with hx prostate CA, HLD, HTN, PAD, renal artery stenosis s/p left ptra, CKD stage 3-4, hx of vertigo who presents from home with emesis and dizziness. History and collateral also obtained from pt's daughter over the phone. Per daughter family had a get together last night, pt had leftovers, salad, artichoke pie, no alcohol. Around 2am pt woke up with dizziness described as room spinning, nausea, fatigue, and feeling of malaise. Pt lives at home alone, uses a walker.

## 2021-12-27 NOTE — PHYSICAL THERAPY INITIAL EVALUATION ADULT - PRECAUTIONS/LIMITATIONS, REHAB EVAL
June 5, 2020     Patient: Lindsey Zelaya   YOB: 1953   Date of Visit: 6/3/2020       To Whom it May Concern:       This is to certify Lindsey Zelaya was evaluated with Destinee Hilliard MD on 05/27/20 and is unable to return to work until reevaluation on 06/19/2020.     RESTRICTIONS: per physical therapy       Sincerely,         Destinee Hilliard MD    Medical information is confidential and cannot be disclosed without the written consent of the patient or her representative.       no known precautions/limitations fall precautions

## 2021-12-28 LAB
ANION GAP SERPL CALC-SCNC: 13 MMOL/L — SIGNIFICANT CHANGE UP (ref 5–17)
BLD GP AB SCN SERPL QL: NEGATIVE — SIGNIFICANT CHANGE UP
BUN SERPL-MCNC: 25 MG/DL — HIGH (ref 7–23)
CALCIUM SERPL-MCNC: 9.1 MG/DL — SIGNIFICANT CHANGE UP (ref 8.4–10.5)
CHLORIDE SERPL-SCNC: 108 MMOL/L — SIGNIFICANT CHANGE UP (ref 96–108)
CO2 SERPL-SCNC: 22 MMOL/L — SIGNIFICANT CHANGE UP (ref 22–31)
CREAT SERPL-MCNC: 1.7 MG/DL — HIGH (ref 0.5–1.3)
GLUCOSE SERPL-MCNC: 128 MG/DL — HIGH (ref 70–99)
GRAM STN FLD: SIGNIFICANT CHANGE UP
POTASSIUM SERPL-MCNC: 3.9 MMOL/L — SIGNIFICANT CHANGE UP (ref 3.5–5.3)
POTASSIUM SERPL-SCNC: 3.9 MMOL/L — SIGNIFICANT CHANGE UP (ref 3.5–5.3)
RH IG SCN BLD-IMP: NEGATIVE — SIGNIFICANT CHANGE UP
SODIUM SERPL-SCNC: 143 MMOL/L — SIGNIFICANT CHANGE UP (ref 135–145)

## 2021-12-28 PROCEDURE — 76770 US EXAM ABDO BACK WALL COMP: CPT | Mod: 26

## 2021-12-28 PROCEDURE — 99233 SBSQ HOSP IP/OBS HIGH 50: CPT

## 2021-12-28 PROCEDURE — 99232 SBSQ HOSP IP/OBS MODERATE 35: CPT

## 2021-12-28 PROCEDURE — 93306 TTE W/DOPPLER COMPLETE: CPT | Mod: 26

## 2021-12-28 RX ADMIN — SENNA PLUS 2 TABLET(S): 8.6 TABLET ORAL at 22:20

## 2021-12-28 RX ADMIN — POLYETHYLENE GLYCOL 3350 17 GRAM(S): 17 POWDER, FOR SOLUTION ORAL at 12:24

## 2021-12-28 RX ADMIN — HEPARIN SODIUM 5000 UNIT(S): 5000 INJECTION INTRAVENOUS; SUBCUTANEOUS at 12:24

## 2021-12-28 RX ADMIN — HEPARIN SODIUM 5000 UNIT(S): 5000 INJECTION INTRAVENOUS; SUBCUTANEOUS at 22:20

## 2021-12-28 RX ADMIN — AMLODIPINE BESYLATE 5 MILLIGRAM(S): 2.5 TABLET ORAL at 05:56

## 2021-12-28 RX ADMIN — CLOPIDOGREL BISULFATE 75 MILLIGRAM(S): 75 TABLET, FILM COATED ORAL at 12:23

## 2021-12-28 RX ADMIN — ATORVASTATIN CALCIUM 40 MILLIGRAM(S): 80 TABLET, FILM COATED ORAL at 22:20

## 2021-12-28 RX ADMIN — SODIUM CHLORIDE 75 MILLILITER(S): 9 INJECTION, SOLUTION INTRAVENOUS at 02:30

## 2021-12-28 RX ADMIN — CILOSTAZOL 100 MILLIGRAM(S): 100 TABLET ORAL at 17:36

## 2021-12-28 RX ADMIN — Medication 81 MILLIGRAM(S): at 12:24

## 2021-12-28 RX ADMIN — HEPARIN SODIUM 5000 UNIT(S): 5000 INJECTION INTRAVENOUS; SUBCUTANEOUS at 05:56

## 2021-12-28 RX ADMIN — CILOSTAZOL 100 MILLIGRAM(S): 100 TABLET ORAL at 05:55

## 2021-12-28 RX ADMIN — CARVEDILOL PHOSPHATE 25 MILLIGRAM(S): 80 CAPSULE, EXTENDED RELEASE ORAL at 05:55

## 2021-12-28 RX ADMIN — ONDANSETRON 4 MILLIGRAM(S): 8 TABLET, FILM COATED ORAL at 22:20

## 2021-12-28 RX ADMIN — CARVEDILOL PHOSPHATE 25 MILLIGRAM(S): 80 CAPSULE, EXTENDED RELEASE ORAL at 17:36

## 2021-12-28 RX ADMIN — SODIUM CHLORIDE 75 MILLILITER(S): 9 INJECTION, SOLUTION INTRAVENOUS at 22:20

## 2021-12-28 NOTE — PROGRESS NOTE ADULT - ASSESSMENT
Assessment:  1. hx prostate CA,D  2. HLD  3. HTN  4. PAD  5. Renal artery stenosis s/p left ptra  6. CKD stage 3-4    Plan:  1. Awaiting echocardiogram  2. No issues with his renal stents- his renal function is stable.   3. Continue DAPT, atorvastatin and cilostazol at current doses.       Thank you  Vascular Cardiology Service  Please call with any questions:   DIRECT SERVICE NUMBER:  [phone]  Office [phone]  email:  [email or web address]

## 2021-12-28 NOTE — PROGRESS NOTE ADULT - SUBJECTIVE AND OBJECTIVE BOX
Vascular Cardiology  Progress note  DIRECT SERVICE NUMBER: [phone]            EMAIL [email or web address]   OFFICE [phone]    CC:      INTERVAL HISTORY:  No acute events overnight.         Allergies    No Known Allergies    Intolerances    	    MEDICATIONS:  amLODIPine   Tablet 5 milliGRAM(s) Oral daily  aspirin enteric coated 81 milliGRAM(s) Oral daily  carvedilol 25 milliGRAM(s) Oral every 12 hours  cilostazol 100 milliGRAM(s) Oral two times a day  clopidogrel Tablet 75 milliGRAM(s) Oral daily  furosemide    Tablet 40 milliGRAM(s) Oral <User Schedule>  heparin   Injectable 5000 Unit(s) SubCutaneous every 8 hours        acetaminophen     Tablet .. 650 milliGRAM(s) Oral every 6 hours PRN  ondansetron Injectable 4 milliGRAM(s) IV Push every 8 hours PRN    aluminum hydroxide/magnesium hydroxide/simethicone Suspension 30 milliLiter(s) Oral every 6 hours PRN  polyethylene glycol 3350 17 Gram(s) Oral daily  senna 2 Tablet(s) Oral at bedtime    atorvastatin 40 milliGRAM(s) Oral at bedtime    calcitriol   Capsule 0.25 MICROGram(s) Oral <User Schedule>  influenza  Vaccine (HIGH DOSE) 0.7 milliLiter(s) IntraMuscular once  lactated ringers. 1000 milliLiter(s) IV Continuous <Continuous>      PAST MEDICAL & SURGICAL HISTORY:  HTN - Hypertension    Hypercholesterolemia    PC (prostate cancer)  s/p surgical resection 3 years ago    Gout    H/O prostatectomy    H/O carotid endarterectomy    H/O renal artery stenosis  s/p stent in Left RA    Status post cataract extraction, unspecified laterality        FAMILY HISTORY:      SOCIAL HISTORY:  unchanged    REVIEW OF SYSTEMS:  CONSTITUTIONAL: No fever, weight loss. Fatigue present.   EYES: No eye pain, visual disturbances, or discharge  ENT:  No difficulty hearing, tinnitus; No sinus or throat pain. Vertigo present.   NECK: No pain or stiffness  RESPIRATORY:  no respiratory distress.   CARDIOVASCULAR:  no CP  GASTROINTESTINAL: No abdominal or epigastric pain. No nausea, vomiting, or hematemesis; No diarrhea or constipation. No melena or hematochezia.  GENITOURINARY: No dysuria, frequency, hematuria, or incontinence  NEUROLOGICAL: No headaches, memory loss, loss of strength, numbness, or tremors  SKIN: no rash   LYMPH Nodes: No enlarged glands  ENDOCRINE: No heat or cold intolerance; No hair loss  MUSCULOSKELETAL: No joint pain or swelling; No muscle, back, or extremity pain  PSYCHIATRIC: No depression, anxiety, mood swings, or difficulty sleeping  HEME/LYMPH: No easy bruising, or bleeding gums  ALLERGY AND IMMUNOLOGIC: No hives or eczema    [ x] All others negative	  [ ] Unable to obtain    PHYSICAL EXAM:  T(C): 36.8 (12-28-21 @ 04:19), Max: 36.9 (12-27-21 @ 12:47)  HR: 74 (12-28-21 @ 04:19) (69 - 76)  BP: 133/71 (12-28-21 @ 04:19) (110/65 - 133/71)  RR: 17 (12-28-21 @ 04:19) (15 - 17)  SpO2: 94% (12-28-21 @ 04:19) (94% - 96%)  Wt(kg): --  I&O's Summary    27 Dec 2021 07:01  -  28 Dec 2021 07:00  --------------------------------------------------------  IN: 800 mL / OUT: 890 mL / NET: -90 mL        Appearance:  	  HEENT:   Normal oral mucosa, PERRL, EOMI	  Carotid: no brut   Lymphatic: No lymphadenopathy  Cardiovascular:  RRR, (-) murmur  Respiratory: CTAB 	  Psychiatry:  AAO x3   Gastrointestinal:  Soft, Non-tender, + BS	  Skin: No rashes, No ecchymoses, No cyanosis	  Neurologic:  no focal deficit  Extremities:  no edema    Vascular Pulse Exam:  Right DP: [x]palpable []non-palpable []audible      Left DP :   [x]palpable []non-palpable []audible  Right PT: [x]palpable [] non-palpable []audible   Left PT:  [x] palpable [] non-palpable []audible  [] Palpable      LABS:	 	    CBC Full  -  ( 27 Dec 2021 07:53 )  WBC Count : 7.28 K/uL  Hemoglobin : 10.4 g/dL  Hematocrit : 30.6 %  Platelet Count - Automated : 171 K/uL  Mean Cell Volume : 110.9 fl  Mean Cell Hemoglobin : 37.7 pg  Mean Cell Hemoglobin Concentration : 34.0 gm/dL  Auto Neutrophil # : 5.84 K/uL  Auto Lymphocyte # : 0.82 K/uL  Auto Monocyte # : 0.42 K/uL  Auto Eosinophil # : 0.13 K/uL  Auto Basophil # : 0.02 K/uL  Auto Neutrophil % : 80.1 %  Auto Lymphocyte % : 11.3 %  Auto Monocyte % : 5.8 %  Auto Eosinophil % : 1.8 %  Auto Basophil % : 0.3 %    12-27    141  |  109<H>  |  28<H>  ----------------------------<  143<H>  3.7   |  20<L>  |  1.75<H>  12-26    141  |  104  |  35<H>  ----------------------------<  253<H>  4.4   |  22  |  1.88<H>    Ca    8.8      27 Dec 2021 07:50  Ca    9.4      26 Dec 2021 11:49  Phos  3.5     12-27  Mg     2.0     12-27  Mg     2.3     12-26    TPro  6.4  /  Alb  4.0  /  TBili  0.8  /  DBili  x   /  AST  8<L>  /  ALT  7<L>  /  AlkPhos  59  12-27  TPro  7.5  /  Alb  5.0  /  TBili  0.8  /  DBili  x   /  AST  12  /  ALT  11  /  AlkPhos  71  12-26       Vascular Cardiology  Progress note  DIRECT SERVICE NUMBER: [phone]            EMAIL [email or web address]   OFFICE [phone]    CC:  dizzness. nausea    INTERVAL HISTORY:  No acute events overnight.         Allergies    No Known Allergies    Intolerances    	    MEDICATIONS:  amLODIPine   Tablet 5 milliGRAM(s) Oral daily  aspirin enteric coated 81 milliGRAM(s) Oral daily  carvedilol 25 milliGRAM(s) Oral every 12 hours  cilostazol 100 milliGRAM(s) Oral two times a day  clopidogrel Tablet 75 milliGRAM(s) Oral daily  furosemide    Tablet 40 milliGRAM(s) Oral <User Schedule>  heparin   Injectable 5000 Unit(s) SubCutaneous every 8 hours        acetaminophen     Tablet .. 650 milliGRAM(s) Oral every 6 hours PRN  ondansetron Injectable 4 milliGRAM(s) IV Push every 8 hours PRN    aluminum hydroxide/magnesium hydroxide/simethicone Suspension 30 milliLiter(s) Oral every 6 hours PRN  polyethylene glycol 3350 17 Gram(s) Oral daily  senna 2 Tablet(s) Oral at bedtime    atorvastatin 40 milliGRAM(s) Oral at bedtime    calcitriol   Capsule 0.25 MICROGram(s) Oral <User Schedule>  influenza  Vaccine (HIGH DOSE) 0.7 milliLiter(s) IntraMuscular once  lactated ringers. 1000 milliLiter(s) IV Continuous <Continuous>      PAST MEDICAL & SURGICAL HISTORY:  HTN - Hypertension    Hypercholesterolemia    PC (prostate cancer)  s/p surgical resection 3 years ago    Gout    H/O prostatectomy    H/O carotid endarterectomy    H/O renal artery stenosis  s/p stent in Left RA    Status post cataract extraction, unspecified laterality        FAMILY HISTORY:      SOCIAL HISTORY:  unchanged    REVIEW OF SYSTEMS:  CONSTITUTIONAL: No fever, weight loss. Fatigue present.   EYES: No eye pain, visual disturbances, or discharge  ENT:  No difficulty hearing, tinnitus; No sinus or throat pain. Vertigo present.   NECK: No pain or stiffness  RESPIRATORY:  no respiratory distress.   CARDIOVASCULAR:  no CP  GASTROINTESTINAL: No abdominal or epigastric pain. No nausea, vomiting, or hematemesis; No diarrhea or constipation. No melena or hematochezia.  GENITOURINARY: No dysuria, frequency, hematuria, or incontinence  NEUROLOGICAL: No headaches, memory loss, loss of strength, numbness, or tremors  SKIN: no rash   LYMPH Nodes: No enlarged glands  ENDOCRINE: No heat or cold intolerance; No hair loss  MUSCULOSKELETAL: No joint pain or swelling; No muscle, back, or extremity pain  PSYCHIATRIC: No depression, anxiety, mood swings, or difficulty sleeping  HEME/LYMPH: No easy bruising, or bleeding gums  ALLERGY AND IMMUNOLOGIC: No hives or eczema    [ x] All others negative	  [ ] Unable to obtain    PHYSICAL EXAM:  T(C): 36.8 (12-28-21 @ 04:19), Max: 36.9 (12-27-21 @ 12:47)  HR: 74 (12-28-21 @ 04:19) (69 - 76)  BP: 133/71 (12-28-21 @ 04:19) (110/65 - 133/71)  RR: 17 (12-28-21 @ 04:19) (15 - 17)  SpO2: 94% (12-28-21 @ 04:19) (94% - 96%)  Wt(kg): --  I&O's Summary    27 Dec 2021 07:01  -  28 Dec 2021 07:00  --------------------------------------------------------  IN: 800 mL / OUT: 890 mL / NET: -90 mL        Appearance:  	  HEENT:   Normal oral mucosa, PERRL, EOMI	  Carotid: no brut   Lymphatic: No lymphadenopathy  Cardiovascular:  RRR, (-) murmur  Respiratory: CTAB 	  Psychiatry:  AAO x3   Gastrointestinal:  Soft, Non-tender, + BS	  Skin: No rashes, No ecchymoses, No cyanosis	  Neurologic:  no focal deficit  Extremities:  no edema    Vascular Pulse Exam:  Right DP: [x]palpable []non-palpable []audible      Left DP :   [x]palpable []non-palpable []audible  Right PT: [x]palpable [] non-palpable []audible   Left PT:  [x] palpable [] non-palpable []audible  [] Palpable      LABS:	 	    CBC Full  -  ( 27 Dec 2021 07:53 )  WBC Count : 7.28 K/uL  Hemoglobin : 10.4 g/dL  Hematocrit : 30.6 %  Platelet Count - Automated : 171 K/uL  Mean Cell Volume : 110.9 fl  Mean Cell Hemoglobin : 37.7 pg  Mean Cell Hemoglobin Concentration : 34.0 gm/dL  Auto Neutrophil # : 5.84 K/uL  Auto Lymphocyte # : 0.82 K/uL  Auto Monocyte # : 0.42 K/uL  Auto Eosinophil # : 0.13 K/uL  Auto Basophil # : 0.02 K/uL  Auto Neutrophil % : 80.1 %  Auto Lymphocyte % : 11.3 %  Auto Monocyte % : 5.8 %  Auto Eosinophil % : 1.8 %  Auto Basophil % : 0.3 %    12-27    141  |  109<H>  |  28<H>  ----------------------------<  143<H>  3.7   |  20<L>  |  1.75<H>  12-26    141  |  104  |  35<H>  ----------------------------<  253<H>  4.4   |  22  |  1.88<H>    Ca    8.8      27 Dec 2021 07:50  Ca    9.4      26 Dec 2021 11:49  Phos  3.5     12-27  Mg     2.0     12-27  Mg     2.3     12-26    TPro  6.4  /  Alb  4.0  /  TBili  0.8  /  DBili  x   /  AST  8<L>  /  ALT  7<L>  /  AlkPhos  59  12-27  TPro  7.5  /  Alb  5.0  /  TBili  0.8  /  DBili  x   /  AST  12  /  ALT  11  /  AlkPhos  71  12-26

## 2021-12-28 NOTE — PROGRESS NOTE ADULT - SUBJECTIVE AND OBJECTIVE BOX
Overnight events noted      VITAL:  T(C): , Max: 36.9 (21 @ 12:47)  T(F): , Max: 98.5 (21 @ 12:47)  HR: 74 (21 @ 04:19)  BP: 133/71 (21 @ 04:19)  BP(mean): --  RR: 17 (21 @ 04:19)  SpO2: 94% (21 @ 04:19)      PHYSICAL EXAM:  Constitutional: NAD, Alert  HEENT: NCAT, DMM  Neck: Supple, No JVD  Respiratory: CTA-b/l  Cardiovascular: RRR s1s2, no m/r/g  Gastrointestinal: BS+, soft, NT/ND  Extremities: No peripheral edema b/l  Neurological: no focal deficits; strength grossly intact  Back: no CVAT b/l  Skin: No rashes, no nevi      LABS:                        10.4   7.28  )-----------( 171      ( 27 Dec 2021 07:53 )             30.6     Na(143)/K(3.9)/Cl(108)/HCO3(22)/BUN(25)/Cr(1.70)Glu(128)/Ca(9.1)/Mg(--)/PO4(--)     @ 06:31  Na(141)/K(3.7)/Cl(109)/HCO3(20)/BUN(28)/Cr(1.75)Glu(143)/Ca(8.8)/Mg(2.0)/PO4(3.5)     @ 07:50  Na(141)/K(4.4)/Cl(104)/HCO3(22)/BUN(35)/Cr(1.88)Glu(253)/Ca(9.4)/Mg(2.3)/PO4(--)     @ 11:49    Urinalysis Basic - ( 26 Dec 2021 13:19 )  Color: Light Yellow / Appearance: Clear / S.013 / pH: x  Gluc: x / Ketone: Negative  / Bili: Negative / Urobili: Negative   Blood: x / Protein: 100 / Nitrite: Negative   Leuk Esterase: Negative / RBC: 1 /hpf / WBC 2 /HPF   Sq Epi: x / Non Sq Epi: 1 /hpf / Bacteria: Negative      IMPRESSION: 81M w/ HTN, USHA, and CKD 3-4, 21 p/w night sweats, vomiting, and vertigo    (1)Renal - CKD3-4 - due to renal artery stenosis. At/near baseline GFR    (2)Lytes - highly acceptable    (3)CV - resolving hypovolemia    (4)GI/Neuro - nausea, vomiting, vertigo - are these all symptoms due to mild infection/associated hypovolemia? Clinically improving.      RECOMMEND:  (1)IVF as ordered  (2)Trend orthostatics  (3)Dose new meds for GFR ~30ml/min        Edis Cabral MD  [facility]  Office: [phone]  Cell: [phone]       Nausea improving; dizziness/vertigo improving      VITAL:  T(C): , Max: 36.9 (21 @ 12:47)  T(F): , Max: 98.5 (21 @ 12:47)  HR: 74 (21 @ 04:19)  BP: 133/71 (21 @ 04:19)  BP(mean): --  RR: 17 (21 @ 04:19)  SpO2: 94% (21 @ 04:19)      PHYSICAL EXAM:  Constitutional: NAD, Alert  HEENT: NCAT, DMM  Neck: Supple, No JVD  Respiratory: CTA-b/l  Cardiovascular: RRR s1s2, no m/r/g  Gastrointestinal: BS+, soft, NT/ND  Extremities: No peripheral edema b/l  Neurological: no focal deficits; strength grossly intact  Back: no CVAT b/l  Skin: No rashes, no nevi      LABS:                        10.4   7.28  )-----------( 171      ( 27 Dec 2021 07:53 )             30.6     Na(143)/K(3.9)/Cl(108)/HCO3(22)/BUN(25)/Cr(1.70)Glu(128)/Ca(9.1)/Mg(--)/PO4(--)     @ 06:31  Na(141)/K(3.7)/Cl(109)/HCO3(20)/BUN(28)/Cr(1.75)Glu(143)/Ca(8.8)/Mg(2.0)/PO4(3.5)     @ 07:50  Na(141)/K(4.4)/Cl(104)/HCO3(22)/BUN(35)/Cr(1.88)Glu(253)/Ca(9.4)/Mg(2.3)/PO4(--)     @ 11:49    Urinalysis Basic - ( 26 Dec 2021 13:19 )  Color: Light Yellow / Appearance: Clear / S.013 / pH: x  Gluc: x / Ketone: Negative  / Bili: Negative / Urobili: Negative   Blood: x / Protein: 100 / Nitrite: Negative   Leuk Esterase: Negative / RBC: 1 /hpf / WBC 2 /HPF   Sq Epi: x / Non Sq Epi: 1 /hpf / Bacteria: Negative      IMPRESSION: 81M w/ HTN, USHA, and CKD 3-4, 21 p/w night sweats, vomiting, and vertigo    (1)Renal - CKD3-4 - due to renal artery stenosis. At/near baseline GFR    (2)Lytes - highly acceptable    (3)CV - resolving hypovolemia    (4)ID - gastroenteritis? Clinically improving.      RECOMMEND:  (1)IVF as ordered  (2)Trend orthostatics  (3)Dose new meds for GFR ~30ml/min        Edis Cabral MD  [facility]  Office: [phone]  Cell: [phone]

## 2021-12-28 NOTE — PROGRESS NOTE ADULT - SUBJECTIVE AND OBJECTIVE BOX
PROGRESS NOTE:   Radha Mcclendon DO  Hospitalist  Pager [phone]  After 5pm/weekends or if no answer ext: [phone]      Patient is a 81y old  Male who presents with a chief complaint of Dizziness, nausea/vomiting (28 Dec 2021 08:58)      SUBJECTIVE / OVERNIGHT EVENTS:  Still having some nausea but no more headache and says overall improved. Still no appetite though.     ADDITIONAL REVIEW OF SYSTEMS:  no fever or chills  + nausea, vomited yesterday    MEDICATIONS  (STANDING):  amLODIPine   Tablet 5 milliGRAM(s) Oral daily  aspirin enteric coated 81 milliGRAM(s) Oral daily  atorvastatin 40 milliGRAM(s) Oral at bedtime  calcitriol   Capsule 0.25 MICROGram(s) Oral <User Schedule>  carvedilol 25 milliGRAM(s) Oral every 12 hours  cilostazol 100 milliGRAM(s) Oral two times a day  clopidogrel Tablet 75 milliGRAM(s) Oral daily  furosemide    Tablet 40 milliGRAM(s) Oral <User Schedule>  heparin   Injectable 5000 Unit(s) SubCutaneous every 8 hours  influenza  Vaccine (HIGH DOSE) 0.7 milliLiter(s) IntraMuscular once  lactated ringers. 1000 milliLiter(s) (75 mL/Hr) IV Continuous <Continuous>  polyethylene glycol 3350 17 Gram(s) Oral daily  senna 2 Tablet(s) Oral at bedtime    MEDICATIONS  (PRN):  acetaminophen     Tablet .. 650 milliGRAM(s) Oral every 6 hours PRN Temp greater or equal to 38C (100.4F), Mild Pain (1 - 3)  aluminum hydroxide/magnesium hydroxide/simethicone Suspension 30 milliLiter(s) Oral every 6 hours PRN Dyspepsia  ondansetron Injectable 4 milliGRAM(s) IV Push every 8 hours PRN Nausea and/or Vomiting      CAPILLARY BLOOD GLUCOSE      POCT Blood Glucose.: 152 mg/dL (27 Dec 2021 21:59)    I&O's Summary    27 Dec 2021 07:01  -  28 Dec 2021 07:00  --------------------------------------------------------  IN: 800 mL / OUT: 890 mL / NET: -90 mL        PHYSICAL EXAM:  Vital Signs Last 24 Hrs  T(C): 36.8 (28 Dec 2021 04:19), Max: 36.9 (27 Dec 2021 12:47)  T(F): 98.2 (28 Dec 2021 04:19), Max: 98.5 (27 Dec 2021 12:47)  HR: 74 (28 Dec 2021 04:19) (69 - 76)  BP: 133/71 (28 Dec 2021 04:19) (110/65 - 133/71)  BP(mean): --  RR: 17 (28 Dec 2021 04:19) (15 - 17)  SpO2: 94% (28 Dec 2021 04:19) (94% - 96%)    CONSTITUTIONAL: NAD, well-developed, non toxic appering  RESPIRATORY: Normal respiratory effort; lungs are clear to auscultation bilaterally  CARDIOVASCULAR: Regular rate and rhythm, normal S1 and S2, no murmur/rub/gallop; No lower extremity edema; Peripheral pulses are 2+ bilaterally  ABDOMEN: Nontender to palpation, normoactive bowel sounds, no rebound/guarding; No hepatosplenomegaly  MUSCLOSKELETAL: no clubbing or cyanosis of digits; no joint swelling or tenderness to palpation  PSYCH: A+O to person, place, and time; affect appropriate    LABS:                        10.4   7.28  )-----------( 171      ( 27 Dec 2021 07:53 )             30.6         143  |  108  |  25<H>  ----------------------------<  128<H>  3.9   |  22  |  1.70<H>    Ca    9.1      28 Dec 2021 06:31  Phos  3.5       Mg     2.0         TPro  6.4  /  Alb  4.0  /  TBili  0.8  /  DBili  x   /  AST  8<L>  /  ALT  7<L>  /  AlkPhos  59            Urinalysis Basic - ( 26 Dec 2021 13:19 )    Color: Light Yellow / Appearance: Clear / S.013 / pH: x  Gluc: x / Ketone: Negative  / Bili: Negative / Urobili: Negative   Blood: x / Protein: 100 / Nitrite: Negative   Leuk Esterase: Negative / RBC: 1 /hpf / WBC 2 /HPF   Sq Epi: x / Non Sq Epi: 1 /hpf / Bacteria: Negative        Culture - Urine (collected 27 Dec 2021 01:16)  Source: Clean Catch Clean Catch (Midstream)  Final Report (27 Dec 2021 22:13):    <10,000 CFU/mL Normal Urogenital Lori    Culture - Blood (collected 26 Dec 2021 23:04)  Source: .Blood Blood-Peripheral  Preliminary Report (28 Dec 2021 01:02):    No growth to date.    Culture - Blood (collected 26 Dec 2021 23:04)  Source: .Blood Blood-Peripheral  Preliminary Report (28 Dec 2021 01:02):    No growth to date.        RADIOLOGY & ADDITIONAL TESTS:  Results Reviewed:   Imaging Personally Reviewed:  Electrocardiogram Personally Reviewed:    COORDINATION OF CARE:  Care Discussed with Consultants/Other Providers [Y/N]:  Prior or Outpatient Records Reviewed [Y/N]:

## 2021-12-29 DIAGNOSIS — R78.81 BACTEREMIA: ICD-10-CM

## 2021-12-29 LAB
ANION GAP SERPL CALC-SCNC: 13 MMOL/L — SIGNIFICANT CHANGE UP (ref 5–17)
BUN SERPL-MCNC: 22 MG/DL — SIGNIFICANT CHANGE UP (ref 7–23)
CALCIUM SERPL-MCNC: 8.8 MG/DL — SIGNIFICANT CHANGE UP (ref 8.4–10.5)
CHLORIDE SERPL-SCNC: 108 MMOL/L — SIGNIFICANT CHANGE UP (ref 96–108)
CO2 SERPL-SCNC: 21 MMOL/L — LOW (ref 22–31)
CREAT SERPL-MCNC: 1.73 MG/DL — HIGH (ref 0.5–1.3)
CULTURE RESULTS: SIGNIFICANT CHANGE UP
GLUCOSE SERPL-MCNC: 124 MG/DL — HIGH (ref 70–99)
POTASSIUM SERPL-MCNC: 3.9 MMOL/L — SIGNIFICANT CHANGE UP (ref 3.5–5.3)
POTASSIUM SERPL-SCNC: 3.9 MMOL/L — SIGNIFICANT CHANGE UP (ref 3.5–5.3)
SODIUM SERPL-SCNC: 142 MMOL/L — SIGNIFICANT CHANGE UP (ref 135–145)
SPECIMEN SOURCE: SIGNIFICANT CHANGE UP

## 2021-12-29 PROCEDURE — 99233 SBSQ HOSP IP/OBS HIGH 50: CPT

## 2021-12-29 PROCEDURE — 99232 SBSQ HOSP IP/OBS MODERATE 35: CPT

## 2021-12-29 PROCEDURE — 99222 1ST HOSP IP/OBS MODERATE 55: CPT

## 2021-12-29 RX ORDER — AMPICILLIN TRIHYDRATE 250 MG
CAPSULE ORAL
Refills: 0 | Status: DISCONTINUED | OUTPATIENT
Start: 2021-12-29 | End: 2021-12-30

## 2021-12-29 RX ORDER — MECLIZINE HCL 12.5 MG
25 TABLET ORAL EVERY 12 HOURS
Refills: 0 | Status: DISCONTINUED | OUTPATIENT
Start: 2021-12-29 | End: 2022-01-05

## 2021-12-29 RX ORDER — AMPICILLIN TRIHYDRATE 250 MG
CAPSULE ORAL
Refills: 0 | Status: DISCONTINUED | OUTPATIENT
Start: 2021-12-29 | End: 2021-12-29

## 2021-12-29 RX ORDER — AMPICILLIN TRIHYDRATE 250 MG
2 CAPSULE ORAL EVERY 6 HOURS
Refills: 0 | Status: DISCONTINUED | OUTPATIENT
Start: 2021-12-29 | End: 2021-12-30

## 2021-12-29 RX ORDER — AMPICILLIN TRIHYDRATE 250 MG
2 CAPSULE ORAL ONCE
Refills: 0 | Status: COMPLETED | OUTPATIENT
Start: 2021-12-29 | End: 2021-12-29

## 2021-12-29 RX ADMIN — CILOSTAZOL 100 MILLIGRAM(S): 100 TABLET ORAL at 17:33

## 2021-12-29 RX ADMIN — Medication 216 GRAM(S): at 23:41

## 2021-12-29 RX ADMIN — POLYETHYLENE GLYCOL 3350 17 GRAM(S): 17 POWDER, FOR SOLUTION ORAL at 12:43

## 2021-12-29 RX ADMIN — ATORVASTATIN CALCIUM 40 MILLIGRAM(S): 80 TABLET, FILM COATED ORAL at 22:11

## 2021-12-29 RX ADMIN — HEPARIN SODIUM 5000 UNIT(S): 5000 INJECTION INTRAVENOUS; SUBCUTANEOUS at 05:40

## 2021-12-29 RX ADMIN — Medication 25 MILLIGRAM(S): at 12:42

## 2021-12-29 RX ADMIN — HEPARIN SODIUM 5000 UNIT(S): 5000 INJECTION INTRAVENOUS; SUBCUTANEOUS at 12:43

## 2021-12-29 RX ADMIN — CARVEDILOL PHOSPHATE 25 MILLIGRAM(S): 80 CAPSULE, EXTENDED RELEASE ORAL at 17:33

## 2021-12-29 RX ADMIN — CLOPIDOGREL BISULFATE 75 MILLIGRAM(S): 75 TABLET, FILM COATED ORAL at 12:43

## 2021-12-29 RX ADMIN — Medication 216 GRAM(S): at 17:32

## 2021-12-29 RX ADMIN — CILOSTAZOL 100 MILLIGRAM(S): 100 TABLET ORAL at 05:40

## 2021-12-29 RX ADMIN — Medication 216 GRAM(S): at 09:38

## 2021-12-29 RX ADMIN — HEPARIN SODIUM 5000 UNIT(S): 5000 INJECTION INTRAVENOUS; SUBCUTANEOUS at 22:11

## 2021-12-29 RX ADMIN — AMLODIPINE BESYLATE 5 MILLIGRAM(S): 2.5 TABLET ORAL at 05:40

## 2021-12-29 RX ADMIN — Medication 40 MILLIGRAM(S): at 05:39

## 2021-12-29 RX ADMIN — Medication 81 MILLIGRAM(S): at 12:43

## 2021-12-29 RX ADMIN — Medication 25 MILLIGRAM(S): at 23:52

## 2021-12-29 RX ADMIN — CALCITRIOL 0.25 MICROGRAM(S): 0.5 CAPSULE ORAL at 05:39

## 2021-12-29 RX ADMIN — CARVEDILOL PHOSPHATE 25 MILLIGRAM(S): 80 CAPSULE, EXTENDED RELEASE ORAL at 05:40

## 2021-12-29 NOTE — DIETITIAN INITIAL EVALUATION ADULT. - PHYSCIAL ASSESSMENT
Skin: pressure ulcer in raghu. heel stage 1 as per documentation.  No visual signs of muscle/fat loss noted. overweight

## 2021-12-29 NOTE — PROGRESS NOTE ADULT - SUBJECTIVE AND OBJECTIVE BOX
Overnight events noted      VITAL:  T(C): , Max: 37 (12-28-21 @ 19:53)  T(F): , Max: 98.6 (12-28-21 @ 19:53)  HR: 83 (12-29-21 @ 12:24)  BP: 128/78 (12-29-21 @ 12:24)  BP(mean): --  RR: 17 (12-29-21 @ 12:24)  SpO2: 97% (12-29-21 @ 12:24)      PHYSICAL EXAM:  Constitutional: NAD, Alert  HEENT: NCAT, DMM  Neck: Supple, No JVD  Respiratory: CTA-b/l  Cardiovascular: RRR s1s2, no m/r/g  Gastrointestinal: BS+, soft, NT/ND  Extremities: No peripheral edema b/l  Neurological: no focal deficits; strength grossly intact  Back: no CVAT b/l  Skin: No rashes, no nevi    LABS:    Na(142)/K(3.9)/Cl(108)/HCO3(21)/BUN(22)/Cr(1.73)Glu(124)/Ca(8.8)/Mg(--)/PO4(--)    12-29 @ 07:13  Na(143)/K(3.9)/Cl(108)/HCO3(22)/BUN(25)/Cr(1.70)Glu(128)/Ca(9.1)/Mg(--)/PO4(--)    12-28 @ 06:31  Na(141)/K(3.7)/Cl(109)/HCO3(20)/BUN(28)/Cr(1.75)Glu(143)/Ca(8.8)/Mg(2.0)/PO4(3.5)    12-27 @ 07:50      IMPRESSION: 81M w/ HTN, USHA, and CKD 3-4, 12/26/21 p/w night sweats, vomiting, and vertigo    (1)Renal - CKD3-4 - due to renal artery stenosis. At/near baseline GFR    (2)Lytes - highly acceptable    (3)CV - resolved hypovolemia. Not orthostatic    (4)ID - gastroenteritis? Clinically improved      RECOMMEND:  (1)D/C planning per primary team/followup at my office as previously planned          MD [facility]  Office: [phone]  Cell: [phone]       (+)dizziness upon standing      VITAL:  T(C): , Max: 37 (12-28-21 @ 19:53)  T(F): , Max: 98.6 (12-28-21 @ 19:53)  HR: 83 (12-29-21 @ 12:24)  BP: 128/78 (12-29-21 @ 12:24)  BP(mean): --  RR: 17 (12-29-21 @ 12:24)  SpO2: 97% (12-29-21 @ 12:24)      PHYSICAL EXAM:  Constitutional: NAD, Alert  HEENT: NCAT, DMM  Neck: Supple, No JVD  Respiratory: CTA-b/l  Cardiovascular: RRR s1s2, no m/r/g  Gastrointestinal: BS+, soft, NT/ND  Extremities: No peripheral edema b/l  Neurological: no focal deficits; strength grossly intact  Back: no CVAT b/l  Skin: No rashes, no nevi    LABS:    Na(142)/K(3.9)/Cl(108)/HCO3(21)/BUN(22)/Cr(1.73)Glu(124)/Ca(8.8)/Mg(--)/PO4(--)    12-29 @ 07:13  Na(143)/K(3.9)/Cl(108)/HCO3(22)/BUN(25)/Cr(1.70)Glu(128)/Ca(9.1)/Mg(--)/PO4(--)    12-28 @ 06:31  Na(141)/K(3.7)/Cl(109)/HCO3(20)/BUN(28)/Cr(1.75)Glu(143)/Ca(8.8)/Mg(2.0)/PO4(3.5)    12-27 @ 07:50      IMPRESSION: 81M w/ HTN, USHA, and CKD 3-4, 12/26/21 p/w night sweats, vomiting, and vertigo    (1)Renal - CKD3-4 - due to renal artery stenosis. At/near baseline GFR    (2)Lytes - highly acceptable    (3)CV - resolved hypovolemia. Not orthostatic    (4)ID - GPR from BCx 12/26 - contaminant? ID on board      RECOMMEND:  (1)Treatment of dizziness per primary team  (2)Dose new meds for GFR 30ml/min        Edis Cabral MD  [facility]  Office: [phone]  Cell: [phone]

## 2021-12-29 NOTE — DIETITIAN INITIAL EVALUATION ADULT. - REASON FOR ADMISSION
Pt 80 y/o M with PMH as per chart: prostate CA, HLD, HTN, PAD, renal artery stenosis S/P left PTA, CKD stage 3-4, hx of vertigo, gout, admitted with emesis, nausea, and dizziness, unclear etiology.

## 2021-12-29 NOTE — DIETITIAN INITIAL EVALUATION ADULT. - ADD RECOMMEND
1. Will continue to monitor PO intake when applicable, weight, labs, skin, GI status, diet. 2. Once applicable, gently encourage PO intake and honor food preferences. 3. Consider Multivitamin if medically feasible to optimize nutrient intake and help with pressure ulcer healing. 4. Provided recommendations to help with nausea and vomiting - made aware RD remains available.

## 2021-12-29 NOTE — PROGRESS NOTE ADULT - ASSESSMENT
Assessment:  1. Renal artery stenosis s/p left PTA  2. PAD  3. HTN  4. HLD  5. History of Prostate CA  6. CKD stage 3-4  7. Vertigo    Plan:  1. TTE reviewed showing normal wall motion, preserved LVEF, mild AS.  2. Remains hemodynamically stable.   3. No issues with his prior renal artery intervention - his renal function remains stable.   4. Continue ASA/Plavix, and Pletal. Continue Statin.  5. BP stable on Norvasc and Coreg.   6. Continue Heparin Sub. Cut. for DVT PPX.  7. Patient wants to work with PT today and will try to increase his PO intake.     Thank you  ASHLYN Garduno, MPAS  Vascular Cardiology Service  Please call with any questions:   [phone]

## 2021-12-29 NOTE — DIETITIAN INITIAL EVALUATION ADULT. - DIET TYPE
1. Recommend Ensure Clear 3x daily (540 saul and 24 gm protein) while on clear liquid diet. 2. Once medically feasible, consider advance to regular diet. Will continue to monitor as able and adjust as needed.

## 2021-12-29 NOTE — DIETITIAN INITIAL EVALUATION ADULT. - REASON INDICATOR FOR ASSESSMENT
Pt seen for clear liquid diet x 4 days.   Information obtained from: medical record, previous RD note, pt, and MD.

## 2021-12-29 NOTE — PROGRESS NOTE ADULT - SUBJECTIVE AND OBJECTIVE BOX
Vascular Cardiology  Progress note  DIRECT SERVICE NUMBER: [phone]           OFFICE [phone]    CC:  dizziness & N/V    INTERVAL HISTORY:  No acute events overnight.   No C/P or SOB.  No LE pain or edema.  Reports feeling better.  Wants to get OOB with PT.  Wants to try to increase PO intake.   TTE showed normal wall motion, preserved LVEF, mild AS.    Allergies  No Known Allergies    MEDICATIONS  (STANDING):  amLODIPine   Tablet 5 milliGRAM(s) Oral daily  ampicillin  IVPB 2 Gram(s) IV Intermittent every 6 hours  ampicillin  IVPB      aspirin enteric coated 81 milliGRAM(s) Oral daily  atorvastatin 40 milliGRAM(s) Oral at bedtime  calcitriol   Capsule 0.25 MICROGram(s) Oral <User Schedule>  carvedilol 25 milliGRAM(s) Oral every 12 hours  cilostazol 100 milliGRAM(s) Oral two times a day  clopidogrel Tablet 75 milliGRAM(s) Oral daily  furosemide    Tablet 40 milliGRAM(s) Oral <User Schedule>  heparin   Injectable 5000 Unit(s) SubCutaneous every 8 hours  influenza  Vaccine (HIGH DOSE) 0.7 milliLiter(s) IntraMuscular once  lactated ringers. 1000 milliLiter(s) (75 mL/Hr) IV Continuous <Continuous>  meclizine 25 milliGRAM(s) Oral every 12 hours  polyethylene glycol 3350 17 Gram(s) Oral daily  senna 2 Tablet(s) Oral at bedtime    PAST MEDICAL & SURGICAL HISTORY:  HTN - Hypertension  Hypercholesterolemia  PC (prostate cancer)  s/p surgical resection 3 years ago  Gout  H/O prostatectomy  H/O carotid endarterectomy  H/O renal artery stenosis  s/p stent in Left RA  Status post cataract extraction    FAMILY HISTORY: see HPI    SOCIAL HISTORY:  unchanged    REVIEW OF SYSTEMS:  CONSTITUTIONAL: No fever  EYES: No eye pain  ENT:  No throat pain. Vertigo present.   NECK: No pain  RESPIRATORY:  No SOB  CARDIOVASCULAR: No C/P  GASTROINTESTINAL: No abdominal pain. No melena or hematochezia.  GENITOURINARY: No hematuria  NEUROLOGICAL: No memory loss  SKIN: no rash   LYMPH Nodes: No enlarged glands noted  ENDOCRINE: No heat or cold intolerance noted  MUSCULOSKELETAL: No KE edema   PSYCHIATRIC: No depression, anxiety  HEME/LYMPH: No bleeding gums  ALLERGY AND IMMUNOLOGIC: No hives    [ x] All others negative	    PHYSICAL EXAM:  Vital Signs Last 24 Hrs  T(C): 36.8 (29 Dec 2021 04:59), Max: 37 (28 Dec 2021 19:53)  T(F): 98.3 (29 Dec 2021 04:59), Max: 98.6 (28 Dec 2021 19:53)  HR: 71 (29 Dec 2021 04:59) (64 - 77)  BP: 129/74 (29 Dec 2021 04:59) (129/74 - 139/76)  RR: 16 (29 Dec 2021 04:59) (15 - 16)  SpO2: 93% (29 Dec 2021 04:59) (93% - 94%)    Appearance: NAD	  HEENT: NC/AT  Cardiovascular:  RRR, S1 and S2  Respiratory: CTA B/L  Psychiatry:  AAO x3   Gastrointestinal:  Soft, Non-tender, + BS	  Skin: No rashes, No ecchymoses, No cyanosis	  Neurologic:  no focal deficit  Extremities:  no LE Edema, bilateral calves soft  Feet: no tissue loss or ulceration      LABS:	 	    12-29    142  |  108  |  22  ----------------------------<  124<H>  3.9   |  21<L>  |  1.73<H>    Ca    8.8      29 Dec 2021 07:13

## 2021-12-29 NOTE — DIETITIAN INITIAL EVALUATION ADULT. - OTHER INFO
Pt on clear liquid diet x ~4 days. As per MD, will advance diet once pt has no more nausea and vomiting. Pt reports tolerating clear liquid diet, reports last emesis episode was 2 days ago (12/27). Reports last BM was 2 days ago as well, denies diarrhea or constipation. Pt agreed to try Ensure Clear while on clear liquid diet.    Pt reports 5 pounds weight gain PTA from 185 to 190 pounds, unable to recall reason or time frames, states "recently" -?accuracy. Weight as per previous RD note (09/01/2020) 220 pounds with edema. Weight as per flow sheets (12/26) 190 pounds.    Provided recommendations to help with nausea and vomiting once diet is advanced. Pt denies having further questions/concerns about diet and nutrition at this time - made aware RD remains available. Pt on clear liquid diet x ~4 days. As per MD, will advance diet once nausea and vomiting are resolved. Pt reports tolerating clear liquid diet, reports last emesis episode was 2 days ago (12/27). Reports last BM was 2 days ago as well, denies diarrhea or constipation. Pt agreed to try Ensure Clear while on clear liquid diet.    Pt reports 5 pounds weight gain PTA from 185 to 190 pounds, unable to recall reason or time frames, states "recently". Weight as per previous RD note (09/01/2020) 220 pounds with edema. Weight as per flow sheets (12/26) 190 pounds.    Provided recommendations to help with nausea and vomiting. Pt denies having further questions/concerns about diet and nutrition at this time - made aware RD remains available.

## 2021-12-29 NOTE — DIETITIAN INITIAL EVALUATION ADULT. - PROBLEM SELECTOR PLAN 1
Pt with emesis, weakness, nausea, vomiting now improved  ?Gastroenteritis from food   Troponins negative, EKG without st changes   CT A/P negative for SBO, with colonic diverticulosis, no acute changes   LFTs wnl, lipase normal   No diarrhea, ua, cxr negative for infectious etiology   Clears diet, advance as tolerated   IVF NS @ 65cc/hr   Send blood cultures  Trend wbc, temp curve, cmp

## 2021-12-29 NOTE — DIETITIAN INITIAL EVALUATION ADULT. - ORAL INTAKE PTA/DIET HISTORY
Pt reports good appetite and PO intake at home, denies changes in PO intake. Confirms NKFA. Pt reports following a low K+ diet for "renal disease" at home and states consuming less portion sizes and no soda to lose weight. Denies hx of difficulty chewing/swallowing. Pt reports not taking any vitamins or nutritional supplements PTA. Pt reports good appetite and PO intake at home. Confirms NKFA. Pt reports following a low K+ diet for "renal disease" at home and states consuming less portion sizes and no soda to lose weight. Denies hx of difficulty chewing/swallowing. Pt reports not taking any vitamins or nutritional supplements PTA. Pt denies hx of DM or pre-DM - noted HbA1c (12/27) 6.0%.

## 2021-12-29 NOTE — CONSULT NOTE ADULT - SUBJECTIVE AND OBJECTIVE BOX
HPI:  81M with hx prostate CA, HLD, HTN, PAD, renal artery stenosis s/p left ptra, CKD stage 3-4, hx of vertigo who presents from home with emesis and dizziness. History and collateral also obtained from pt's daughter over the phone. Per daughter family had a get together last night, pt had leftovers, salad, artichoke pie, no alcohol. Around 2am pt woke up with dizziness described as room spinning, nausea, fatigue, and feeling of malaise. Pt lives at home alone, uses a walker. Daughter went to his house the next morning as he felt weak. Daughter called EMS and when EMS tried to sit him up he had an episode of nbnb emesis. Pt reports the episode was similar to his prior episode of vertigo that happened 8 years ago, pt has never seen a neurologist. Denies chest pain, sob, palpitations, fevers, chills, loc during the episode. No known sick contacts or travel history.      (26 Dec 2021 17:29)      PAST MEDICAL & SURGICAL HISTORY:  HTN - Hypertension    Hypercholesterolemia    PC (prostate cancer)  s/p surgical resection 3 years ago    Gout    H/O prostatectomy    H/O carotid endarterectomy    H/O renal artery stenosis  s/p stent in Left RA    Status post cataract extraction, unspecified laterality        Allergies    No Known Allergies    Intolerances        ANTIMICROBIALS:  ampicillin  IVPB 2 every 6 hours  ampicillin  IVPB        OTHER MEDS:  acetaminophen     Tablet .. 650 milliGRAM(s) Oral every 6 hours PRN  aluminum hydroxide/magnesium hydroxide/simethicone Suspension 30 milliLiter(s) Oral every 6 hours PRN  amLODIPine   Tablet 5 milliGRAM(s) Oral daily  aspirin enteric coated 81 milliGRAM(s) Oral daily  atorvastatin 40 milliGRAM(s) Oral at bedtime  calcitriol   Capsule 0.25 MICROGram(s) Oral <User Schedule>  carvedilol 25 milliGRAM(s) Oral every 12 hours  cilostazol 100 milliGRAM(s) Oral two times a day  clopidogrel Tablet 75 milliGRAM(s) Oral daily  furosemide    Tablet 40 milliGRAM(s) Oral <User Schedule>  heparin   Injectable 5000 Unit(s) SubCutaneous every 8 hours  influenza  Vaccine (HIGH DOSE) 0.7 milliLiter(s) IntraMuscular once  lactated ringers. 1000 milliLiter(s) IV Continuous <Continuous>  meclizine 25 milliGRAM(s) Oral every 12 hours  ondansetron Injectable 4 milliGRAM(s) IV Push every 8 hours PRN  polyethylene glycol 3350 17 Gram(s) Oral daily  senna 2 Tablet(s) Oral at bedtime      SOCIAL HISTORY:  lives alone,  has 3 children, former smoker  no  alcohol or drug abuse  no recent travel    FAMILY HISTORY:  no recent febrile illness in family members    ROS:  Unobtainable because:   All other systems negative     Constitutional: no fever, no chills  Eye: no eye pain, no redness, no vision changes  ENT:  no sore throat, no rhinorrhea  Cardiovascular:  no chest pain, no palpitation  Respiratory:  no SOB, no cough  GI:  no abd pain, had nausea, vomiting but not now, no diarrhea  urinary: no dysuria, no hematuria, no flank pain  : no  discharge or bleeding  musculoskeletal:  no joint pain, no joint swelling  skin:  no rash  neurology:  no headache, no seizure, +vertigo  psych: no anxiety, no depression     Physical Exam:    General:    NAD, non toxic  Head: atraumatic, normocephalic  Eyes: normal sclera and conjunctiva  ENT:   no oropharyngeal lesions, no LAD, neck supple  Cardio:    regular S1,S2  Respiratory:   clear b/l, no wheezing  abd:   soft, BS +, not tender  :     no CVAT, no suprapubic tenderness, no sandhu  Musculoskeletal : no joint swelling, no edema  Skin:    no rash  vascular: no phlebitis  Neurologic:     no focal deficits  psych: normal affect      Drug Dosing Weight  Height (cm): 170.2 (26 Dec 2021 10:42)  Weight (kg): 86.2 (26 Dec 2021 10:42)  BMI (kg/m2): 29.8 (26 Dec 2021 10:42)  BSA (m2): 1.98 (26 Dec 2021 10:42)    Vital Signs Last 24 Hrs  T(F): 97.9 (12-29-21 @ 12:24), Max: 98.6 (12-28-21 @ 19:53)    Vital Signs Last 24 Hrs  HR: 83 (12-29-21 @ 12:24) (64 - 83)  BP: 128/78 (12-29-21 @ 12:24) (128/78 - 139/76)  RR: 17 (12-29-21 @ 12:24)  SpO2: 97% (12-29-21 @ 12:24) (93% - 97%)  Wt(kg): --        12-29    142  |  108  |  22  ----------------------------<  124<H>  3.9   |  21<L>  |  1.73<H>    Ca    8.8      29 Dec 2021 07:13            MICROBIOLOGY:  v  Clean Catch Clean Catch (Midstream)  12-27-21   <10,000 CFU/mL Normal Urogenital Lori  --  --      .Blood Blood-Peripheral  12-26-21   No growth to date.  --    Growth in aerobic bottle: Gram Positive Rods                  RADIOLOGY:    Images independently visualized and reviewed personally,  findings as below    < from: US Kidney and Bladder (12.28.21 @ 20:33) >    IMPRESSION:  Atrophic right kidney.    Increased renal cortical echogenicity bilaterally, which may be secondary   to medical renal disease.    No hydronephrosis.    < end of copied text >  < from: CT Abdomen and Pelvis No Cont (12.26.21 @ 14:28) >  No bowel obstruction.    Colonic diverticulosis, without associated inflammatory changes.    Status post interval placement of left renal artery stent since 8/26/2020.      < end of copied text >  < from: CT Head No Cont (12.26.21 @ 14:28) >  IMPRESSION:    No acute intracranial hemorrhage or depressed calvarial fracture. Chronic   findings as above.      < end of copied text >

## 2021-12-29 NOTE — PROGRESS NOTE ADULT - SUBJECTIVE AND OBJECTIVE BOX
PROGRESS NOTE:   Radha Mcclendon DO  Hospitalist  Pager [phone]  After 5pm/weekends or if no answer ext: [phone]      Patient is a 81y old  Male who presents with a chief complaint of Pt 80 y/o M with PMH as per chart: prostate CA, HLD, HTN, PAD, renal artery stenosis S/P left PTA, CKD stage 3-4, hx of vertigo, gout, admitted with emesis, nausea, and dizziness, unclear etiology. (29 Dec 2021 10:54)      SUBJECTIVE / OVERNIGHT EVENTS: Still dizzy when he stands up.  Nauseated and doesnt want to advance diet just yet.     ADDITIONAL REVIEW OF SYSTEMS:  no fever or chills  no n/v/d    MEDICATIONS  (STANDING):  amLODIPine   Tablet 5 milliGRAM(s) Oral daily  ampicillin  IVPB 2 Gram(s) IV Intermittent every 6 hours  ampicillin  IVPB      aspirin enteric coated 81 milliGRAM(s) Oral daily  atorvastatin 40 milliGRAM(s) Oral at bedtime  calcitriol   Capsule 0.25 MICROGram(s) Oral <User Schedule>  carvedilol 25 milliGRAM(s) Oral every 12 hours  cilostazol 100 milliGRAM(s) Oral two times a day  clopidogrel Tablet 75 milliGRAM(s) Oral daily  furosemide    Tablet 40 milliGRAM(s) Oral <User Schedule>  heparin   Injectable 5000 Unit(s) SubCutaneous every 8 hours  influenza  Vaccine (HIGH DOSE) 0.7 milliLiter(s) IntraMuscular once  lactated ringers. 1000 milliLiter(s) (75 mL/Hr) IV Continuous <Continuous>  meclizine 25 milliGRAM(s) Oral every 12 hours  polyethylene glycol 3350 17 Gram(s) Oral daily  senna 2 Tablet(s) Oral at bedtime    MEDICATIONS  (PRN):  acetaminophen     Tablet .. 650 milliGRAM(s) Oral every 6 hours PRN Temp greater or equal to 38C (100.4F), Mild Pain (1 - 3)  aluminum hydroxide/magnesium hydroxide/simethicone Suspension 30 milliLiter(s) Oral every 6 hours PRN Dyspepsia  ondansetron Injectable 4 milliGRAM(s) IV Push every 8 hours PRN Nausea and/or Vomiting      CAPILLARY BLOOD GLUCOSE      POCT Blood Glucose.: 144 mg/dL (28 Dec 2021 21:57)  POCT Blood Glucose.: 117 mg/dL (28 Dec 2021 17:24)    I&O's Summary    28 Dec 2021 07:01  -  29 Dec 2021 07:00  --------------------------------------------------------  IN: 0 mL / OUT: 300 mL / NET: -300 mL    29 Dec 2021 07:01  -  29 Dec 2021 11:36  --------------------------------------------------------  IN: 360 mL / OUT: 300 mL / NET: 60 mL        PHYSICAL EXAM:  Vital Signs Last 24 Hrs  T(C): 36.8 (29 Dec 2021 04:59), Max: 37 (28 Dec 2021 19:53)  T(F): 98.3 (29 Dec 2021 04:59), Max: 98.6 (28 Dec 2021 19:53)  HR: 71 (29 Dec 2021 04:59) (64 - 77)  BP: 129/74 (29 Dec 2021 04:59) (129/74 - 139/76)  BP(mean): --  RR: 16 (29 Dec 2021 04:59) (15 - 16)  SpO2: 93% (29 Dec 2021 04:59) (93% - 94%)    CONSTITUTIONAL: NAD, well-developed, non toxic apeparing  RESPIRATORY: Normal respiratory effort; lungs are clear to auscultation bilaterally  CARDIOVASCULAR: Regular rate and rhythm, normal S1 and S2, no murmur/rub/gallop; No lower extremity edema; Peripheral pulses are 2+ bilaterally  ABDOMEN: Nontender to palpation, normoactive bowel sounds, no rebound/guarding; No hepatosplenomegaly  MUSCLOSKELETAL: no clubbing or cyanosis of digits; no joint swelling or tenderness to palpation  PSYCH: A+O to person, place, and time; affect appropriate    LABS:    12-29    142  |  108  |  22  ----------------------------<  124<H>  3.9   |  21<L>  |  1.73<H>    Ca    8.8      29 Dec 2021 07:13                Culture - Urine (collected 27 Dec 2021 01:16)  Source: Clean Catch Clean Catch (Midstream)  Final Report (27 Dec 2021 22:13):    <10,000 CFU/mL Normal Urogenital Lori    Culture - Blood (collected 26 Dec 2021 23:04)  Source: .Blood Blood-Peripheral  Gram Stain (28 Dec 2021 23:29):    Growth in aerobic bottle: Gram Positive Rods  Preliminary Report (28 Dec 2021 23:30):    Growth in aerobic bottle: Gram Positive Rods    **BCID performed. No targets detected.**    ***Blood Panel PCR results on this specimen are available    approximately 3 hours after the Gram stain result.***    Gram stain, PCR, and/or culture results may not always    correspond due to difference in methodologies.    ************************************************************    This PCR assay was performed by multiplex PCR. This    Assay tests for 66 bacterial and resistance gene targets.    Please refer to [facility] test directory    at [email or web address] for details.    Culture - Blood (collected 26 Dec 2021 23:04)  Source: .Blood Blood-Peripheral  Preliminary Report (28 Dec 2021 01:02):    No growth to date.        RADIOLOGY & ADDITIONAL TESTS:  Results Reviewed:   Imaging Personally Reviewed:  Electrocardiogram Personally Reviewed:  Conclusions:  1. Mitral annular calcification, otherwise normal mitral  valve.  2. Calcified trileaflet aortic valve with normal opening.  Peak transaortic valve gradient equals 19 mm Hg, mean  transaortic valve gradient equals 11 mm Hg, estimated  aortic valve area equals 1.8 sqcm (by continuity equation),  aortic valve velocity time integral equals 47 cm,  consistent with mild aortic stenosis.  3. Normal left ventricular systolic function. No segmental  wall motion abnormalities.  4. Normal right ventricular size and function.  *** Compared with echocardiogram of 9/3/2020, no significant changes noted.      COORDINATION OF CARE:  Care Discussed with Consultants/Other Providers [Y/N]:  Prior or Outpatient Records Reviewed [Y/N]:

## 2021-12-30 ENCOUNTER — TRANSCRIPTION ENCOUNTER (OUTPATIENT)
Age: 81
End: 2021-12-30

## 2021-12-30 LAB
ANION GAP SERPL CALC-SCNC: 14 MMOL/L — SIGNIFICANT CHANGE UP (ref 5–17)
BASOPHILS # BLD AUTO: 0.04 K/UL — SIGNIFICANT CHANGE UP (ref 0–0.2)
BASOPHILS NFR BLD AUTO: 0.6 % — SIGNIFICANT CHANGE UP (ref 0–2)
BUN SERPL-MCNC: 23 MG/DL — SIGNIFICANT CHANGE UP (ref 7–23)
CALCIUM SERPL-MCNC: 9.2 MG/DL — SIGNIFICANT CHANGE UP (ref 8.4–10.5)
CHLORIDE SERPL-SCNC: 102 MMOL/L — SIGNIFICANT CHANGE UP (ref 96–108)
CO2 SERPL-SCNC: 24 MMOL/L — SIGNIFICANT CHANGE UP (ref 22–31)
CREAT SERPL-MCNC: 1.94 MG/DL — HIGH (ref 0.5–1.3)
EOSINOPHIL # BLD AUTO: 0.15 K/UL — SIGNIFICANT CHANGE UP (ref 0–0.5)
EOSINOPHIL NFR BLD AUTO: 2.4 % — SIGNIFICANT CHANGE UP (ref 0–6)
GLUCOSE SERPL-MCNC: 118 MG/DL — HIGH (ref 70–99)
HCT VFR BLD CALC: 30.2 % — LOW (ref 39–50)
HGB BLD-MCNC: 10.5 G/DL — LOW (ref 13–17)
IMM GRANULOCYTES NFR BLD AUTO: 1.4 % — SIGNIFICANT CHANGE UP (ref 0–1.5)
LYMPHOCYTES # BLD AUTO: 1.33 K/UL — SIGNIFICANT CHANGE UP (ref 1–3.3)
LYMPHOCYTES # BLD AUTO: 21.2 % — SIGNIFICANT CHANGE UP (ref 13–44)
MCHC RBC-ENTMCNC: 34.8 GM/DL — SIGNIFICANT CHANGE UP (ref 32–36)
MCHC RBC-ENTMCNC: 36.8 PG — HIGH (ref 27–34)
MCV RBC AUTO: 106 FL — HIGH (ref 80–100)
MONOCYTES # BLD AUTO: 0.45 K/UL — SIGNIFICANT CHANGE UP (ref 0–0.9)
MONOCYTES NFR BLD AUTO: 7.2 % — SIGNIFICANT CHANGE UP (ref 2–14)
NEUTROPHILS # BLD AUTO: 4.2 K/UL — SIGNIFICANT CHANGE UP (ref 1.8–7.4)
NEUTROPHILS NFR BLD AUTO: 67.2 % — SIGNIFICANT CHANGE UP (ref 43–77)
NRBC # BLD: 0 /100 WBCS — SIGNIFICANT CHANGE UP (ref 0–0)
PLATELET # BLD AUTO: 153 K/UL — SIGNIFICANT CHANGE UP (ref 150–400)
POTASSIUM SERPL-MCNC: 3.6 MMOL/L — SIGNIFICANT CHANGE UP (ref 3.5–5.3)
POTASSIUM SERPL-SCNC: 3.6 MMOL/L — SIGNIFICANT CHANGE UP (ref 3.5–5.3)
RBC # BLD: 2.85 M/UL — LOW (ref 4.2–5.8)
RBC # FLD: 16.9 % — HIGH (ref 10.3–14.5)
SODIUM SERPL-SCNC: 140 MMOL/L — SIGNIFICANT CHANGE UP (ref 135–145)
WBC # BLD: 6.26 K/UL — SIGNIFICANT CHANGE UP (ref 3.8–10.5)
WBC # FLD AUTO: 6.26 K/UL — SIGNIFICANT CHANGE UP (ref 3.8–10.5)

## 2021-12-30 PROCEDURE — 99231 SBSQ HOSP IP/OBS SF/LOW 25: CPT

## 2021-12-30 PROCEDURE — 99233 SBSQ HOSP IP/OBS HIGH 50: CPT

## 2021-12-30 PROCEDURE — 99232 SBSQ HOSP IP/OBS MODERATE 35: CPT

## 2021-12-30 PROCEDURE — 99222 1ST HOSP IP/OBS MODERATE 55: CPT | Mod: GC

## 2021-12-30 RX ADMIN — SENNA PLUS 2 TABLET(S): 8.6 TABLET ORAL at 21:01

## 2021-12-30 RX ADMIN — Medication 81 MILLIGRAM(S): at 09:00

## 2021-12-30 RX ADMIN — HEPARIN SODIUM 5000 UNIT(S): 5000 INJECTION INTRAVENOUS; SUBCUTANEOUS at 05:51

## 2021-12-30 RX ADMIN — AMLODIPINE BESYLATE 5 MILLIGRAM(S): 2.5 TABLET ORAL at 05:50

## 2021-12-30 RX ADMIN — Medication 25 MILLIGRAM(S): at 21:00

## 2021-12-30 RX ADMIN — HEPARIN SODIUM 5000 UNIT(S): 5000 INJECTION INTRAVENOUS; SUBCUTANEOUS at 17:00

## 2021-12-30 RX ADMIN — Medication 216 GRAM(S): at 05:50

## 2021-12-30 RX ADMIN — CLOPIDOGREL BISULFATE 75 MILLIGRAM(S): 75 TABLET, FILM COATED ORAL at 09:00

## 2021-12-30 RX ADMIN — HEPARIN SODIUM 5000 UNIT(S): 5000 INJECTION INTRAVENOUS; SUBCUTANEOUS at 21:00

## 2021-12-30 RX ADMIN — ATORVASTATIN CALCIUM 40 MILLIGRAM(S): 80 TABLET, FILM COATED ORAL at 21:00

## 2021-12-30 RX ADMIN — Medication 25 MILLIGRAM(S): at 09:00

## 2021-12-30 RX ADMIN — CARVEDILOL PHOSPHATE 25 MILLIGRAM(S): 80 CAPSULE, EXTENDED RELEASE ORAL at 16:59

## 2021-12-30 RX ADMIN — CARVEDILOL PHOSPHATE 25 MILLIGRAM(S): 80 CAPSULE, EXTENDED RELEASE ORAL at 05:50

## 2021-12-30 RX ADMIN — CILOSTAZOL 100 MILLIGRAM(S): 100 TABLET ORAL at 05:50

## 2021-12-30 RX ADMIN — CILOSTAZOL 100 MILLIGRAM(S): 100 TABLET ORAL at 17:00

## 2021-12-30 RX ADMIN — POLYETHYLENE GLYCOL 3350 17 GRAM(S): 17 POWDER, FOR SOLUTION ORAL at 08:59

## 2021-12-30 NOTE — DISCHARGE NOTE NURSING/CASE MANAGEMENT/SOCIAL WORK - NSDPLANG ASIS_GEN_ALL_CORE
No Otolaryngologist Procedure Text (C): After obtaining clear surgical margins the patient was sent to otolaryngology for surgical repair.  The patient understands they will receive post-surgical care and follow-up from the referring physician's office.

## 2021-12-30 NOTE — PROGRESS NOTE ADULT - ASSESSMENT
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

## 2021-12-30 NOTE — DISCHARGE NOTE NURSING/CASE MANAGEMENT/SOCIAL WORK - NSDCPEFALRISK_GEN_ALL_CORE
For information on Fall & Injury Prevention, visit: [email or web address] OR  [email or web address] OR  https://www.cdc.gov/steadi/patient.html

## 2021-12-30 NOTE — PROGRESS NOTE ADULT - SUBJECTIVE AND OBJECTIVE BOX
PROGRESS NOTE:   Radha Mcclendon DO  Hospitalist  Pager [phone]  After 5pm/weekends or if no answer ext: [phone]      Patient is a 81y old  Male who presents with a chief complaint of Dizziness, nausea/vomiting (30 Dec 2021 09:11)      SUBJECTIVE / OVERNIGHT EVENTS: Still having severe vertigo on rising up.     ADDITIONAL REVIEW OF SYSTEMS:  no fever or chills  + n/v    MEDICATIONS  (STANDING):  amLODIPine   Tablet 5 milliGRAM(s) Oral daily  aspirin enteric coated 81 milliGRAM(s) Oral daily  atorvastatin 40 milliGRAM(s) Oral at bedtime  calcitriol   Capsule 0.25 MICROGram(s) Oral <User Schedule>  carvedilol 25 milliGRAM(s) Oral every 12 hours  cilostazol 100 milliGRAM(s) Oral two times a day  clopidogrel Tablet 75 milliGRAM(s) Oral daily  heparin   Injectable 5000 Unit(s) SubCutaneous every 8 hours  influenza  Vaccine (HIGH DOSE) 0.7 milliLiter(s) IntraMuscular once  lactated ringers. 1000 milliLiter(s) (75 mL/Hr) IV Continuous <Continuous>  meclizine 25 milliGRAM(s) Oral every 12 hours  polyethylene glycol 3350 17 Gram(s) Oral daily  senna 2 Tablet(s) Oral at bedtime    MEDICATIONS  (PRN):  acetaminophen     Tablet .. 650 milliGRAM(s) Oral every 6 hours PRN Temp greater or equal to 38C (100.4F), Mild Pain (1 - 3)  aluminum hydroxide/magnesium hydroxide/simethicone Suspension 30 milliLiter(s) Oral every 6 hours PRN Dyspepsia  ondansetron Injectable 4 milliGRAM(s) IV Push every 8 hours PRN Nausea and/or Vomiting      CAPILLARY BLOOD GLUCOSE      POCT Blood Glucose.: 146 mg/dL (29 Dec 2021 22:12)    I&O's Summary    29 Dec 2021 07:01  -  30 Dec 2021 07:00  --------------------------------------------------------  IN: 720 mL / OUT: 1300 mL / NET: -580 mL    30 Dec 2021 07:01  -  30 Dec 2021 12:33  --------------------------------------------------------  IN: 360 mL / OUT: 0 mL / NET: 360 mL        PHYSICAL EXAM:  Vital Signs Last 24 Hrs  T(C): 36.8 (30 Dec 2021 04:17), Max: 36.8 (30 Dec 2021 04:17)  T(F): 98.3 (30 Dec 2021 04:17), Max: 98.3 (30 Dec 2021 04:17)  HR: 77 (30 Dec 2021 04:17) (77 - 80)  BP: 128/76 (30 Dec 2021 04:17) (128/76 - 131/76)  BP(mean): --  RR: 18 (30 Dec 2021 04:17) (16 - 18)  SpO2: 95% (30 Dec 2021 04:17) (95% - 96%)    CONSTITUTIONAL: NAD, well-developed, non toxic appearing  RESPIRATORY: Normal respiratory effort; lungs are clear to auscultation bilaterally  CARDIOVASCULAR: Regular rate and rhythm, normal S1 and S2, no murmur/rub/gallop; No lower extremity edema; Peripheral pulses are 2+ bilaterally  ABDOMEN: Nontender to palpation, normoactive bowel sounds, no rebound/guarding; No hepatosplenomegaly  MUSCLOSKELETAL: no clubbing or cyanosis of digits; no joint swelling or tenderness to palpation  PSYCH: A+O to person, place, and time; affect appropriate    LABS:                        10.5   6.26  )-----------( 153      ( 30 Dec 2021 07:18 )             30.2     12-30    140  |  102  |  23  ----------------------------<  118<H>  3.6   |  24  |  1.94<H>    Ca    9.2      30 Dec 2021 07:18                Culture - Blood (collected 29 Dec 2021 03:56)  Source: .Blood Blood-Peripheral  Preliminary Report (30 Dec 2021 04:01):    No growth to date.    Culture - Blood (collected 29 Dec 2021 03:56)  Source: .Blood Blood-Peripheral  Preliminary Report (30 Dec 2021 04:01):    No growth to date.        RADIOLOGY & ADDITIONAL TESTS:  Results Reviewed:   Imaging Personally Reviewed:  Electrocardiogram Personally Reviewed:    COORDINATION OF CARE:  Care Discussed with Consultants/Other Providers [Y/N]:  Prior or Outpatient Records Reviewed [Y/N]:

## 2021-12-30 NOTE — DISCHARGE NOTE NURSING/CASE MANAGEMENT/SOCIAL WORK - NSDCPNINST_GEN_ALL_CORE
Education given regarding medication/ follow up appointments. Pt understood and was able to provide teach back.

## 2021-12-30 NOTE — PROGRESS NOTE ADULT - ASSESSMENT
Assessment:  1. Renal artery stenosis s/p left PTA  2. PAD  3. HTN  4. HLD  5. History of Prostate CA  6. CKD stage 3-4  7. Vertigo    Plan:  1. TTE reviewed showing normal wall motion, preserved LVEF, mild AS.  2. Remains hemodynamically stable.   3. No issues with his prior renal artery intervention - his renal function remains stable.   4. Continue ASA/Plavix, and Pletal. Continue Statin.  5. BP stable on Norvasc and Coreg.   6. Continue Heparin Sub. Cut. for DVT PPX.  7. Appreciate Neurology consultation for his persistent vertigo and disequilibrium.

## 2021-12-30 NOTE — PROGRESS NOTE ADULT - SUBJECTIVE AND OBJECTIVE BOX
Overnight events noted      VITAL:  T(C): , Max: 36.8 (12-30-21 @ 04:17)  T(F): , Max: 98.3 (12-30-21 @ 04:17)  HR: 77 (12-30-21 @ 04:17)  BP: 128/76 (12-30-21 @ 04:17)  BP(mean): --  RR: 18 (12-30-21 @ 04:17)  SpO2: 95% (12-30-21 @ 04:17)      PHYSICAL EXAM:  Constitutional: NAD, Alert  HEENT: NCAT, DMM  Neck: Supple, No JVD  Respiratory: CTA-b/l  Cardiovascular: RRR s1s2, no m/r/g  Gastrointestinal: BS+, soft, NT/ND  Extremities: No peripheral edema b/l  Neurological: no focal deficits; strength grossly intact  Back: no CVAT b/l  Skin: No rashes, no nevi    LABS:                        10.5   6.26  )-----------( 153      ( 30 Dec 2021 07:18 )             30.2     Na(142)/K(3.9)/Cl(108)/HCO3(21)/BUN(22)/Cr(1.73)Glu(124)/Ca(8.8)/Mg(--)/PO4(--)    12-29 @ 07:13  Na(143)/K(3.9)/Cl(108)/HCO3(22)/BUN(25)/Cr(1.70)Glu(128)/Ca(9.1)/Mg(--)/PO4(--)    12-28 @ 06:31      IMPRESSION: 81M w/ HTN, USHA, and CKD 3-4, 12/26/21 p/w night sweats, vomiting, and vertigo    (1)Renal - CKD3-4 - due to renal artery stenosis. At/near baseline GFR, based on bloodwork 12/29    (2CV - resolved hypovolemia. That being said, seems best that we discontinue the diuretic for now    (3ID - GPR from BCx 12/26 - contaminant? ID on board      RECOMMEND:  (1)D/C TIW Lasix  (2)Prn Meclizine  (3)Upon discharge, he can f/u at my office as previously scheduled              Edis Cabral MD  [facility]  Office: [phone]  Cell: [phone]       (+)dizziness upon standing  no other complaints      VITAL:  T(C): , Max: 36.8 (12-30-21 @ 04:17)  T(F): , Max: 98.3 (12-30-21 @ 04:17)  HR: 77 (12-30-21 @ 04:17)  BP: 128/76 (12-30-21 @ 04:17)  BP(mean): --  RR: 18 (12-30-21 @ 04:17)  SpO2: 95% (12-30-21 @ 04:17)      PHYSICAL EXAM:  Constitutional: NAD, Alert  HEENT: NCAT, DMM  Neck: Supple, No JVD  Respiratory: CTA-b/l  Cardiovascular: RRR s1s2, no m/r/g  Gastrointestinal: BS+, soft, NT/ND  Extremities: No peripheral edema b/l  Neurological: no focal deficits; strength grossly intact  Back: no CVAT b/l  Skin: No rashes, no nevi    LABS:                        10.5   6.26  )-----------( 153      ( 30 Dec 2021 07:18 )             30.2     Na(142)/K(3.9)/Cl(108)/HCO3(21)/BUN(22)/Cr(1.73)Glu(124)/Ca(8.8)/Mg(--)/PO4(--)    12-29 @ 07:13  Na(143)/K(3.9)/Cl(108)/HCO3(22)/BUN(25)/Cr(1.70)Glu(128)/Ca(9.1)/Mg(--)/PO4(--)    12-28 @ 06:31      IMPRESSION: 81M w/ HTN, USHA, and CKD 3-4, 12/26/21 p/w night sweats, vomiting, and vertigo    (1)Renal - CKD3-4 - due to renal artery stenosis. At/near baseline GFR, based on bloodwork 12/29    (2)CV - resolved hypovolemia. That being said, seems best that we discontinue the diuretic for now    (3ID - GPR from BCx 12/26 - contaminant? ID on board    (4)Neuro - vertigo - etiology?    RECOMMEND:  (1)D/C TIW Lasix  (2)Prn Meclizine  (3)Neuro input  (4)Upon discharge, he can f/u at my office as previously scheduled              Edis Cabral MD  [facility]  Office: [phone]  Cell: [phone]

## 2021-12-30 NOTE — DISCHARGE NOTE NURSING/CASE MANAGEMENT/SOCIAL WORK - PATIENT PORTAL LINK FT
You can access the FollowMyHealth Patient Portal offered by [facility] by registering at the following website: [email or web address]. By joining [employer]’s FollowMyHealth portal, you will also be able to view your health information using other applications (apps) compatible with our system.

## 2021-12-30 NOTE — CONSULT NOTE ADULT - ATTENDING COMMENTS
81M with hx prostate CA, HLD, HTN, PAD, renal artery stenosis s/p left ptra, CKD stage 3-4, chronic back pain and spondylosis p/w positional dizziness. Dizziness started when he rolled over in bed. Is worse when moving, better when lying down on his left side. On exam he has right beating nystagmus, slow phase to the left and none on left gaze. He feels the dizziness when sat  up but subsides after a few seconds. Unable to do eply and HIT exam due to back stiffness and pain.   Continue with zofran and meclizine or clonazepam PRN.   Would benefit from vestibular rehab outpatient.   Out of bed and ambulation encouraged as tolerated.

## 2021-12-30 NOTE — CONSULT NOTE ADULT - ASSESSMENT
Patient is a 81M with hx prostate CA, HLD, HTN, PAD, renal artery stenosis s/p left ptra, CKD stage 3-4, hx of vertigo who presents from home with emesis and dizziness. Neurology consulted for persistent dizziness.  Sudden onset at home after waking up. Exacerbated by positional changes. Neuro exam w/ + HINTS exam. Positional dizziness likely peripheral vestibular etiology     Recommendation   - Epley Maneuver   - Meclizine prn or Clonzepam 0.5mg Q8H PRN   - Reglan 4mg  PRN Q8H  - Vestibular therapy outpatient   - Follow up neurology outpatient: [address] [place] [address] [phone]    Case discussed with neurology attending Dr. Cordova 
81 m with HTN, HLD, CAD, carotid stenosis s/p CEA, renal artery stenosis s/p stent, prostate CA, s/p prostatectomy, CKD, vertigo, p/w vertigo  afebrile, normal WBC  u/a negative  abd/pelvis CT: diverticulosis  head CT: no acute findings  blood cx 1/4, GPR    1/4 gram positive pal bacteremia, with no fever or WBC, likely contaminant  vertigo    * f/u the repeat blood cx  * monitor off antibiotics  * monitor the WBC/diff and temp    The above assessment and plan was discussed with the primary team    Afsaneh Canada MD  Pager [phone]  After 5pm and on weekends call [phone]

## 2021-12-30 NOTE — PROGRESS NOTE ADULT - SUBJECTIVE AND OBJECTIVE BOX
Vascular Cardiology  Progress note  DIRECT SERVICE NUMBER: [phone]            EMAIL [email or web address]   OFFICE [phone]    CC:      INTERVAL HISTORY:  Patient continues feel severe vertigo.   No chest pain, dyspnea, leg pain.     Allergies  No Known Allergies  Intolerances      MEDICATIONS:  amLODIPine   Tablet 5 milliGRAM(s) Oral daily  aspirin enteric coated 81 milliGRAM(s) Oral daily  carvedilol 25 milliGRAM(s) Oral every 12 hours  cilostazol 100 milliGRAM(s) Oral two times a day  clopidogrel Tablet 75 milliGRAM(s) Oral daily  heparin   Injectable 5000 Unit(s) SubCutaneous every 8 hours  acetaminophen     Tablet .. 650 milliGRAM(s) Oral every 6 hours PRN  meclizine 25 milliGRAM(s) Oral every 12 hours  ondansetron Injectable 4 milliGRAM(s) IV Push every 8 hours PRN  aluminum hydroxide/magnesium hydroxide/simethicone Suspension 30 milliLiter(s) Oral every 6 hours PRN  polyethylene glycol 3350 17 Gram(s) Oral daily  senna 2 Tablet(s) Oral at bedtime  atorvastatin 40 milliGRAM(s) Oral at bedtime  calcitriol   Capsule 0.25 MICROGram(s) Oral <User Schedule>  influenza  Vaccine (HIGH DOSE) 0.7 milliLiter(s) IntraMuscular once  lactated ringers. 1000 milliLiter(s) IV Continuous <Continuous>      PAST MEDICAL & SURGICAL HISTORY:  HTN - Hypertension    Hypercholesterolemia    PC (prostate cancer)  s/p surgical resection 3 years ago    Gout    H/O prostatectomy    H/O carotid endarterectomy    H/O renal artery stenosis  s/p stent in Left RA    Status post cataract extraction, unspecified laterality        FAMILY HISTORY:      SOCIAL HISTORY:  unchanged    REVIEW OF SYSTEMS:  CONSTITUTIONAL: No fever, weight loss, or fatigue  EYES: No eye pain, visual disturbances, or discharge  ENMT:  No difficulty hearing, tinnitus, vertigo; No sinus or throat pain  NECK: No pain or stiffness  RESPIRATORY: No cough, wheezing, chills or hemoptysis; No Shortness of Breath  CARDIOVASCULAR: No chest pain, palpitations, passing out, dizziness, or leg swelling  GASTROINTESTINAL: No abdominal or epigastric pain. No nausea, vomiting, or hematemesis; No diarrhea or constipation. No melena or hematochezia.  GENITOURINARY: No dysuria, frequency, hematuria, or incontinence  NEUROLOGICAL: Disequilibrium and vertigo present.   SKIN: No itching, burning, rashes, or lesions   LYMPH Nodes: No enlarged glands  ENDOCRINE: No heat or cold intolerance; No hair loss  MUSCULOSKELETAL: No joint pain or swelling; No muscle, back, or extremity pain  PSYCHIATRIC: No depression, anxiety, mood swings, or difficulty sleeping  HEME/LYMPH: No easy bruising, or bleeding gums  ALLERY AND IMMUNOLOGIC: No hives or eczema	    [ x] All others negative	  [ ] Unable to obtain    PHYSICAL EXAM:  T(C): 36.8 (12-30-21 @ 04:17), Max: 36.8 (12-30-21 @ 04:17)  HR: 77 (12-30-21 @ 04:17) (77 - 83)  BP: 128/76 (12-30-21 @ 04:17) (128/76 - 131/76)  RR: 18 (12-30-21 @ 04:17) (16 - 18)  SpO2: 95% (12-30-21 @ 04:17) (95% - 97%)  Wt(kg): --  I&O's Summary    29 Dec 2021 07:01  -  30 Dec 2021 07:00  --------------------------------------------------------  IN: 720 mL / OUT: 1300 mL / NET: -580 mL    Appearance: NAD	  HEENT: NC/AT  Cardiovascular:  RRR, S1 and S2  Respiratory: CTA B/L  Psychiatry:  AAO x3   Gastrointestinal:  Soft, Non-tender, + BS	  Skin: No rashes, No ecchymoses, No cyanosis	  Neurologic:  no focal deficit  Extremities:  no LE Edema, bilateral calves soft  Feet: no tissue loss or ulceration          LABS:	 	    CBC Full  -  ( 30 Dec 2021 07:18 )  WBC Count : 6.26 K/uL  Hemoglobin : 10.5 g/dL  Hematocrit : 30.2 %  Platelet Count - Automated : 153 K/uL  Mean Cell Volume : 106.0 fl  Mean Cell Hemoglobin : 36.8 pg  Mean Cell Hemoglobin Concentration : 34.8 gm/dL  Auto Neutrophil # : 4.20 K/uL  Auto Lymphocyte # : 1.33 K/uL  Auto Monocyte # : 0.45 K/uL  Auto Eosinophil # : 0.15 K/uL  Auto Basophil # : 0.04 K/uL  Auto Neutrophil % : 67.2 %  Auto Lymphocyte % : 21.2 %  Auto Monocyte % : 7.2 %  Auto Eosinophil % : 2.4 %  Auto Basophil % : 0.6 %    12-30    140  |  102  |  23  ----------------------------<  118<H>  3.6   |  24  |  1.94<H>  12-29    142  |  108  |  22  ----------------------------<  124<H>  3.9   |  21<L>  |  1.73<H>    Ca    9.2      30 Dec 2021 07:18  Ca    8.8      29 Dec 2021 07:13     Vascular Cardiology  Progress note  DIRECT SERVICE NUMBER: [phone]            EMAIL [email or web address]   OFFICE [phone]    CC:  dizziness    INTERVAL HISTORY:  Patient continues feel severe vertigo.   No chest pain, dyspnea, leg pain.     Allergies  No Known Allergies  Intolerances      MEDICATIONS:  amLODIPine   Tablet 5 milliGRAM(s) Oral daily  aspirin enteric coated 81 milliGRAM(s) Oral daily  carvedilol 25 milliGRAM(s) Oral every 12 hours  cilostazol 100 milliGRAM(s) Oral two times a day  clopidogrel Tablet 75 milliGRAM(s) Oral daily  heparin   Injectable 5000 Unit(s) SubCutaneous every 8 hours  acetaminophen     Tablet .. 650 milliGRAM(s) Oral every 6 hours PRN  meclizine 25 milliGRAM(s) Oral every 12 hours  ondansetron Injectable 4 milliGRAM(s) IV Push every 8 hours PRN  aluminum hydroxide/magnesium hydroxide/simethicone Suspension 30 milliLiter(s) Oral every 6 hours PRN  polyethylene glycol 3350 17 Gram(s) Oral daily  senna 2 Tablet(s) Oral at bedtime  atorvastatin 40 milliGRAM(s) Oral at bedtime  calcitriol   Capsule 0.25 MICROGram(s) Oral <User Schedule>  influenza  Vaccine (HIGH DOSE) 0.7 milliLiter(s) IntraMuscular once  lactated ringers. 1000 milliLiter(s) IV Continuous <Continuous>      PAST MEDICAL & SURGICAL HISTORY:  HTN - Hypertension    Hypercholesterolemia    PC (prostate cancer)  s/p surgical resection 3 years ago    Gout    H/O prostatectomy    H/O carotid endarterectomy    H/O renal artery stenosis  s/p stent in Left RA    Status post cataract extraction, unspecified laterality        FAMILY HISTORY:      SOCIAL HISTORY:  unchanged    REVIEW OF SYSTEMS:  CONSTITUTIONAL: No fever, weight loss, or fatigue  EYES: No eye pain, visual disturbances, or discharge  ENMT:  No difficulty hearing, tinnitus, vertigo; No sinus or throat pain  NECK: No pain or stiffness  RESPIRATORY: No cough, wheezing, chills or hemoptysis; No Shortness of Breath  CARDIOVASCULAR: No chest pain, palpitations, passing out, dizziness, or leg swelling  GASTROINTESTINAL: No abdominal or epigastric pain. No nausea, vomiting, or hematemesis; No diarrhea or constipation. No melena or hematochezia.  GENITOURINARY: No dysuria, frequency, hematuria, or incontinence  NEUROLOGICAL: Disequilibrium and vertigo present.   SKIN: No itching, burning, rashes, or lesions   LYMPH Nodes: No enlarged glands  ENDOCRINE: No heat or cold intolerance; No hair loss  MUSCULOSKELETAL: No joint pain or swelling; No muscle, back, or extremity pain  PSYCHIATRIC: No depression, anxiety, mood swings, or difficulty sleeping  HEME/LYMPH: No easy bruising, or bleeding gums  ALLERY AND IMMUNOLOGIC: No hives or eczema	    [ x] All others negative	  [ ] Unable to obtain    PHYSICAL EXAM:  T(C): 36.8 (12-30-21 @ 04:17), Max: 36.8 (12-30-21 @ 04:17)  HR: 77 (12-30-21 @ 04:17) (77 - 83)  BP: 128/76 (12-30-21 @ 04:17) (128/76 - 131/76)  RR: 18 (12-30-21 @ 04:17) (16 - 18)  SpO2: 95% (12-30-21 @ 04:17) (95% - 97%)  Wt(kg): --  I&O's Summary    29 Dec 2021 07:01  -  30 Dec 2021 07:00  --------------------------------------------------------  IN: 720 mL / OUT: 1300 mL / NET: -580 mL    Appearance: NAD	  HEENT: NC/AT  Cardiovascular:  RRR, S1 and S2  Respiratory: CTA B/L  Psychiatry:  AAO x3   Gastrointestinal:  Soft, Non-tender, + BS	  Skin: No rashes, No ecchymoses, No cyanosis	  Neurologic:  no focal deficit  Extremities:  no LE Edema, bilateral calves soft  Feet: no tissue loss or ulceration          LABS:	 	    CBC Full  -  ( 30 Dec 2021 07:18 )  WBC Count : 6.26 K/uL  Hemoglobin : 10.5 g/dL  Hematocrit : 30.2 %  Platelet Count - Automated : 153 K/uL  Mean Cell Volume : 106.0 fl  Mean Cell Hemoglobin : 36.8 pg  Mean Cell Hemoglobin Concentration : 34.8 gm/dL  Auto Neutrophil # : 4.20 K/uL  Auto Lymphocyte # : 1.33 K/uL  Auto Monocyte # : 0.45 K/uL  Auto Eosinophil # : 0.15 K/uL  Auto Basophil # : 0.04 K/uL  Auto Neutrophil % : 67.2 %  Auto Lymphocyte % : 21.2 %  Auto Monocyte % : 7.2 %  Auto Eosinophil % : 2.4 %  Auto Basophil % : 0.6 %    12-30    140  |  102  |  23  ----------------------------<  118<H>  3.6   |  24  |  1.94<H>  12-29    142  |  108  |  22  ----------------------------<  124<H>  3.9   |  21<L>  |  1.73<H>    Ca    9.2      30 Dec 2021 07:18  Ca    8.8      29 Dec 2021 07:13

## 2021-12-30 NOTE — CONSULT NOTE ADULT - SUBJECTIVE AND OBJECTIVE BOX
HPI:  Patient is a 81M with hx prostate CA, HLD, HTN, PAD, renal artery stenosis s/p left ptra, CKD stage 3-4, hx of vertigo who presents from home with emesis and dizziness. Neurology consulted for persistent dizziness. Patient reports sudden onset dizziness while at home. He states that he fell asleep on his couch and then when he woke up and turned had sudden dizziness. Currently denies room spinning sensation but reports feeling of unsteadiness/imbalance with position changes. Reports hx of similar symptoms 20yrs ago. Does not recall how it was treated at the time. Denies vision changes.     Rest of collateral per chart review as follows: Per daughter family had a get together last night, pt had leftovers, salad, artichoke pie, no alcohol. Around 2am pt woke up with dizziness described as room spinning, nausea, fatigue, and feeling of malaise. Pt lives at home alone, uses a walker. Daughter went to his house the next morning as he felt weak. Daughter called EMS and when EMS tried to sit him up he had an episode of nbnb emesis. Pt reports the episode was similar to his prior episode of vertigo that happened 8 years ago, pt has never seen a neurologist. Denies chest pain, sob, palpitations, fevers, chills, loc during the episode. No known sick contacts or travel history.       ROS: A 10-system ROS was performed and is negative except for those items noted above and/or in the HPI.    PAST MEDICAL & SURGICAL HISTORY:  HTN - Hypertension    Hypercholesterolemia    PC (prostate cancer)  s/p surgical resection 3 years ago    Gout    H/O prostatectomy    H/O carotid endarterectomy    H/O renal artery stenosis  s/p stent in Left RA    Status post cataract extraction, unspecified laterality      FAMILY HISTORY:      SOCIAL HISTORY: SOCIAL HISTORY:     Marital Status: (  )   (  ) Single  (  )   (  )      Occupation:      Lives: (  ) alone  (  ) with children   (  ) with spouse  (  ) with parents  (  ) other     Illicit Drug Use: (  ) never used  (  ) other _____     Tobacco Use:  (  ) never smoked  (  ) former smoker  (  ) current smoker  (  ) pack year  (  ) last cigarette date     Alcohol Use:      Sexual History:        MEDICATIONS  Home Medications:  aspirin 81 mg oral tablet, chewable: 1 tab(s) orally once a day (26 Dec 2021 17:15)  calcitriol 0.25 mcg oral capsule: 1 cap(s) orally 3 times a week - MWF (26 Dec 2021 17:15)  carvedilol 25 mg oral tablet: 1 tab(s) orally 2 times a day (26 Dec 2021 17:15)  cilostazol 100 mg oral tablet: 1 tab(s) orally 2 times a day (26 Dec 2021 17:15)  furosemide 40 mg oral tablet: 1 tab(s) orally every other day  note: last dose unknown (26 Dec 2021 17:15)  rosuvastatin 10 mg oral tablet: 1 tab(s) orally once a day (26 Dec 2021 17:15)      MEDICATIONS  (STANDING):  amLODIPine   Tablet 5 milliGRAM(s) Oral daily  aspirin enteric coated 81 milliGRAM(s) Oral daily  atorvastatin 40 milliGRAM(s) Oral at bedtime  calcitriol   Capsule 0.25 MICROGram(s) Oral <User Schedule>  carvedilol 25 milliGRAM(s) Oral every 12 hours  cilostazol 100 milliGRAM(s) Oral two times a day  clopidogrel Tablet 75 milliGRAM(s) Oral daily  heparin   Injectable 5000 Unit(s) SubCutaneous every 8 hours  influenza  Vaccine (HIGH DOSE) 0.7 milliLiter(s) IntraMuscular once  lactated ringers. 1000 milliLiter(s) (75 mL/Hr) IV Continuous <Continuous>  meclizine 25 milliGRAM(s) Oral every 12 hours  polyethylene glycol 3350 17 Gram(s) Oral daily  senna 2 Tablet(s) Oral at bedtime    MEDICATIONS  (PRN):  acetaminophen     Tablet .. 650 milliGRAM(s) Oral every 6 hours PRN Temp greater or equal to 38C (100.4F), Mild Pain (1 - 3)  aluminum hydroxide/magnesium hydroxide/simethicone Suspension 30 milliLiter(s) Oral every 6 hours PRN Dyspepsia  ondansetron Injectable 4 milliGRAM(s) IV Push every 8 hours PRN Nausea and/or Vomiting      ALLERGIES/INTOLERANCES:  Allergies  No Known Allergies    Intolerances      OBJECTIVE:  VITALS   Vital Signs Last 24 Hrs  T(C): 36.7 (30 Dec 2021 12:59), Max: 36.8 (30 Dec 2021 04:17)  T(F): 98.1 (30 Dec 2021 12:59), Max: 98.3 (30 Dec 2021 04:17)  HR: 87 (30 Dec 2021 12:59) (77 - 87)  BP: 131/74 (30 Dec 2021 12:59) (128/76 - 131/76)  BP(mean): --  RR: 18 (30 Dec 2021 12:59) (16 - 18)  SpO2: 95% (30 Dec 2021 12:59) (95% - 96%)    PHYSICAL EXAM:  Neurological Exam:  Mental Status: Orientated to self, date and place.  Attention intact.  No dysarthria, aphasia or neglect.  Knowledge intact.  Registration intact.  Short and long term memory grossly intact.    Cranial Nerves: PERRL, EOMI, VFF, few beats, down and right beating nystagmus on R end gaze. No diplopia.  CN V1-3 intact to light touch.  No facial asymmetry.  Hearing intact.  Tongue midline.  Sternocleidomastoid and Trapezius intact bilaterally.  HINTS: No skew, few beats, down and right beating nystagmus on R end gaze. + Corrective saccade with head impulse to the L   Motor:   Tone: normal            Strength:     Upper extremity                      Delt       Bicep    Tricep                                               R         5/5        5/5        5/5       5/5                                            L          5/5        5/5        5/5       5/5  Lower extremity                       HF          KE          KF        DF         PF                                            R        5/5        5/5        5/5       5/5       5/5                                            L         5/5        5/5       5/5       5/5        5/5  Pronator drift: none                 Dysmetria: None to finger-nose-finger  No truncal ataxia.    Tremor: No resting, postural or action tremor.  No myoclonus.  Sensation: intact to light touch  Deep Tendon Reflexes: 1+ bilateral biceps, triceps, brachioradialis, knee and ankle  Toes flexor bilaterally  Gait: Deferred     LABORATORY:  CBC                       10.5   6.26  )-----------( 153      ( 30 Dec 2021 07:18 )             30.2     Chem 12-30    140  |  102  |  23  ----------------------------<  118<H>  3.6   |  24  |  1.94<H>    Ca    9.2      30 Dec 2021 07:18      LFTs   Coagulopathy   Lipid Panel   A1c   Cardiac enzymes     U/A   CSF  Immunological  Other    STUDIES & IMAGING:  Studies (EKG, EEG, EMG, etc):     Radiology (XR, CT, MR, U/S, TTE/JEAN): HPI:  Patient is a 81M with hx prostate CA, HLD, HTN, PAD, renal artery stenosis s/p left ptra, CKD stage 3-4, hx of vertigo who presents from home with emesis and dizziness. Neurology consulted for persistent dizziness. Patient reports sudden onset dizziness while at home. He states that he fell asleep on his couch and then when he woke up and turned had sudden dizziness. Currently denies room spinning sensation but reports feeling of unsteadiness/imbalance with position changes. Reports hx of similar symptoms 20yrs ago. Does not recall how it was treated at the time. Denies vision changes.     Rest of collateral per chart review as follows: Per daughter family had a get together last night, pt had leftovers, salad, artichoke pie, no alcohol. Around 2am pt woke up with dizziness described as room spinning, nausea, fatigue, and feeling of malaise. Pt lives at home alone, uses a walker. Daughter went to his house the next morning as he felt weak. Daughter called EMS and when EMS tried to sit him up he had an episode of nbnb emesis. Pt reports the episode was similar to his prior episode of vertigo that happened 8 years ago, pt has never seen a neurologist. Denies chest pain, sob, palpitations, fevers, chills, loc during the episode. No known sick contacts or travel history.       ROS: A 10-system ROS was performed and is negative except for those items noted above and/or in the HPI.    PAST MEDICAL & SURGICAL HISTORY:  HTN - Hypertension    Hypercholesterolemia    PC (prostate cancer)  s/p surgical resection 3 years ago    Gout    H/O prostatectomy    H/O carotid endarterectomy    H/O renal artery stenosis  s/p stent in Left RA    Status post cataract extraction, unspecified laterality      FAMILY HISTORY: no significant neurologic hx      SOCIAL HISTORY: Social History:  Denies smoking or etoh use (26 Dec 2021 17:29)         MEDICATIONS  Home Medications:  aspirin 81 mg oral tablet, chewable: 1 tab(s) orally once a day (26 Dec 2021 17:15)  calcitriol 0.25 mcg oral capsule: 1 cap(s) orally 3 times a week - MWF (26 Dec 2021 17:15)  carvedilol 25 mg oral tablet: 1 tab(s) orally 2 times a day (26 Dec 2021 17:15)  cilostazol 100 mg oral tablet: 1 tab(s) orally 2 times a day (26 Dec 2021 17:15)  furosemide 40 mg oral tablet: 1 tab(s) orally every other day  note: last dose unknown (26 Dec 2021 17:15)  rosuvastatin 10 mg oral tablet: 1 tab(s) orally once a day (26 Dec 2021 17:15)      MEDICATIONS  (STANDING):  amLODIPine   Tablet 5 milliGRAM(s) Oral daily  aspirin enteric coated 81 milliGRAM(s) Oral daily  atorvastatin 40 milliGRAM(s) Oral at bedtime  calcitriol   Capsule 0.25 MICROGram(s) Oral <User Schedule>  carvedilol 25 milliGRAM(s) Oral every 12 hours  cilostazol 100 milliGRAM(s) Oral two times a day  clopidogrel Tablet 75 milliGRAM(s) Oral daily  heparin   Injectable 5000 Unit(s) SubCutaneous every 8 hours  influenza  Vaccine (HIGH DOSE) 0.7 milliLiter(s) IntraMuscular once  lactated ringers. 1000 milliLiter(s) (75 mL/Hr) IV Continuous <Continuous>  meclizine 25 milliGRAM(s) Oral every 12 hours  polyethylene glycol 3350 17 Gram(s) Oral daily  senna 2 Tablet(s) Oral at bedtime    MEDICATIONS  (PRN):  acetaminophen     Tablet .. 650 milliGRAM(s) Oral every 6 hours PRN Temp greater or equal to 38C (100.4F), Mild Pain (1 - 3)  aluminum hydroxide/magnesium hydroxide/simethicone Suspension 30 milliLiter(s) Oral every 6 hours PRN Dyspepsia  ondansetron Injectable 4 milliGRAM(s) IV Push every 8 hours PRN Nausea and/or Vomiting      ALLERGIES/INTOLERANCES:  Allergies  No Known Allergies    Intolerances      OBJECTIVE:  VITALS   Vital Signs Last 24 Hrs  T(C): 36.7 (30 Dec 2021 12:59), Max: 36.8 (30 Dec 2021 04:17)  T(F): 98.1 (30 Dec 2021 12:59), Max: 98.3 (30 Dec 2021 04:17)  HR: 87 (30 Dec 2021 12:59) (77 - 87)  BP: 131/74 (30 Dec 2021 12:59) (128/76 - 131/76)  BP(mean): --  RR: 18 (30 Dec 2021 12:59) (16 - 18)  SpO2: 95% (30 Dec 2021 12:59) (95% - 96%)    PHYSICAL EXAM:      Neurological Exam:  Mental Status: Orientated to self, date and place.  Attention intact.  No dysarthria, aphasia or neglect.  Knowledge intact.  Registration intact.  Short and long term memory grossly intact.    Cranial Nerves: PERRL, EOMI, VFF, few beats, down and right beating nystagmus on R end gaze. No diplopia.  CN V1-3 intact to light touch.  No facial asymmetry.  Hearing intact.  Tongue midline.  Sternocleidomastoid and Trapezius intact bilaterally.  HINTS: No skew, few beats, down and right beating nystagmus on R end gaze. + Corrective saccade with head impulse to the L   Motor:   Tone: normal            Strength:     Upper extremity                      Delt       Bicep    Tricep                                               R         5/5        5/5        5/5       5/5                                            L          5/5        5/5        5/5       5/5  Lower extremity                       HF          KE          KF        DF         PF                                            R        5/5        5/5        5/5       5/5       5/5                                            L         5/5        5/5       5/5       5/5        5/5  Pronator drift: none                 Dysmetria: None to finger-nose-finger  No truncal ataxia.    Tremor: No resting, postural or action tremor.  No myoclonus.  Sensation: intact to light touch  Deep Tendon Reflexes: 1+ bilateral biceps, triceps, brachioradialis, knee and ankle  Toes flexor bilaterally  Gait: Deferred     LABORATORY:  CBC                       10.5   6.26  )-----------( 153      ( 30 Dec 2021 07:18 )             30.2     Chem 12-30    140  |  102  |  23  ----------------------------<  118<H>  3.6   |  24  |  1.94<H>    Ca    9.2      30 Dec 2021 07:18      LFTs   Coagulopathy   Lipid Panel   A1c   Cardiac enzymes     U/A   CSF  Immunological  Other    STUDIES & IMAGING:  Studies (EKG, EEG, EMG, etc):     Radiology (XR, CT, MR, U/S, TTE/JEAN):

## 2021-12-30 NOTE — PROGRESS NOTE ADULT - SUBJECTIVE AND OBJECTIVE BOX
Follow Up:  bacteremia     Interval History: pt afebrile and repeat blood cx negative, the GPR was identified as corynebacterium imitans     ROS:      All other systems negative    Constitutional: no fever, no chills  Cardiovascular:  no chest pain, no palpitation  Respiratory:  no SOB, no cough  GI:  no abd pain, no vomiting, no diarrhea  urinary: no dysuria, no hematuria, no flank pain  musculoskeletal:  no joint pain, no joint swelling  skin:  no rash  neurology:  + headache and vertigo, no seizure    Allergies  No Known Allergies        ANTIMICROBIALS:      OTHER MEDS:  acetaminophen     Tablet .. 650 milliGRAM(s) Oral every 6 hours PRN  aluminum hydroxide/magnesium hydroxide/simethicone Suspension 30 milliLiter(s) Oral every 6 hours PRN  amLODIPine   Tablet 5 milliGRAM(s) Oral daily  aspirin enteric coated 81 milliGRAM(s) Oral daily  atorvastatin 40 milliGRAM(s) Oral at bedtime  calcitriol   Capsule 0.25 MICROGram(s) Oral <User Schedule>  carvedilol 25 milliGRAM(s) Oral every 12 hours  cilostazol 100 milliGRAM(s) Oral two times a day  clopidogrel Tablet 75 milliGRAM(s) Oral daily  heparin   Injectable 5000 Unit(s) SubCutaneous every 8 hours  influenza  Vaccine (HIGH DOSE) 0.7 milliLiter(s) IntraMuscular once  lactated ringers. 1000 milliLiter(s) IV Continuous <Continuous>  meclizine 25 milliGRAM(s) Oral every 12 hours  ondansetron Injectable 4 milliGRAM(s) IV Push every 8 hours PRN  polyethylene glycol 3350 17 Gram(s) Oral daily  senna 2 Tablet(s) Oral at bedtime      Vital Signs Last 24 Hrs  T(C): 36.7 (30 Dec 2021 12:59), Max: 36.8 (30 Dec 2021 04:17)  T(F): 98.1 (30 Dec 2021 12:59), Max: 98.3 (30 Dec 2021 04:17)  HR: 87 (30 Dec 2021 12:59) (77 - 87)  BP: 131/74 (30 Dec 2021 12:59) (128/76 - 131/76)  BP(mean): --  RR: 18 (30 Dec 2021 12:59) (16 - 18)  SpO2: 95% (30 Dec 2021 12:59) (95% - 96%)    Physical Exam:  General:    NAD,  non toxic  Cardio:     regular S1, S2  Respiratory:    clear b/l,    no wheezing  abd:     soft,   BS +,   no tenderness  :   no CVAT,  no suprapubic tenderness,   no  sandhu  Musculoskeletal:   no joint swelling  vascular: no phlebitis  Skin:    no rash                        10.5   6.26  )-----------( 153      ( 30 Dec 2021 07:18 )             30.2       12-30    140  |  102  |  23  ----------------------------<  118<H>  3.6   |  24  |  1.94<H>    Ca    9.2      30 Dec 2021 07:18            MICROBIOLOGY:  v  .Blood Blood-Peripheral  12-29-21   No growth to date.  --  --      Clean Catch Clean Catch (Midstream)  12-27-21   <10,000 CFU/mL Normal Urogenital Lori  --  --      .Blood Blood-Peripheral  12-26-21   No growth to date.  --    Growth in aerobic bottle: Gram Positive Rods                RADIOLOGY:  Images independently visualized and reviewed personally, findings as below  < from: US Kidney and Bladder (12.28.21 @ 20:33) >  IMPRESSION:  Atrophic right kidney.    Increased renal cortical echogenicity bilaterally, which may be secondary   to medical renal disease.    No hydronephrosis.      < end of copied text >  < from: CT Abdomen and Pelvis No Cont (12.26.21 @ 14:28) >    IMPRESSION:  No bowel obstruction.    Colonic diverticulosis, without associated inflammatory changes.    Status post interval placement of left renal artery stent since 8/26/2020.    < end of copied text >  < from: CT Head No Cont (12.26.21 @ 14:28) >    IMPRESSION:    No acute intracranial hemorrhage or depressed calvarial fracture. Chronic   findings as above.    < end of copied text >

## 2021-12-31 LAB
ANION GAP SERPL CALC-SCNC: 14 MMOL/L — SIGNIFICANT CHANGE UP (ref 5–17)
BUN SERPL-MCNC: 25 MG/DL — HIGH (ref 7–23)
CALCIUM SERPL-MCNC: 8.4 MG/DL — SIGNIFICANT CHANGE UP (ref 8.4–10.5)
CHLORIDE SERPL-SCNC: 101 MMOL/L — SIGNIFICANT CHANGE UP (ref 96–108)
CO2 SERPL-SCNC: 21 MMOL/L — LOW (ref 22–31)
CREAT SERPL-MCNC: 2.01 MG/DL — HIGH (ref 0.5–1.3)
GLUCOSE SERPL-MCNC: 128 MG/DL — HIGH (ref 70–99)
HCT VFR BLD CALC: 27.2 % — LOW (ref 39–50)
HCT VFR BLD CALC: 28.3 % — LOW (ref 39–50)
HGB BLD-MCNC: 10.1 G/DL — LOW (ref 13–17)
HGB BLD-MCNC: 9.5 G/DL — LOW (ref 13–17)
MCHC RBC-ENTMCNC: 34.9 GM/DL — SIGNIFICANT CHANGE UP (ref 32–36)
MCHC RBC-ENTMCNC: 35.7 GM/DL — SIGNIFICANT CHANGE UP (ref 32–36)
MCHC RBC-ENTMCNC: 37.5 PG — HIGH (ref 27–34)
MCHC RBC-ENTMCNC: 37.8 PG — HIGH (ref 27–34)
MCV RBC AUTO: 106 FL — HIGH (ref 80–100)
MCV RBC AUTO: 107.5 FL — HIGH (ref 80–100)
NRBC # BLD: 0 /100 WBCS — SIGNIFICANT CHANGE UP (ref 0–0)
NRBC # BLD: 0 /100 WBCS — SIGNIFICANT CHANGE UP (ref 0–0)
PLATELET # BLD AUTO: 138 K/UL — LOW (ref 150–400)
PLATELET # BLD AUTO: 142 K/UL — LOW (ref 150–400)
POTASSIUM SERPL-MCNC: 3.5 MMOL/L — SIGNIFICANT CHANGE UP (ref 3.5–5.3)
POTASSIUM SERPL-SCNC: 3.5 MMOL/L — SIGNIFICANT CHANGE UP (ref 3.5–5.3)
RBC # BLD: 2.53 M/UL — LOW (ref 4.2–5.8)
RBC # BLD: 2.67 M/UL — LOW (ref 4.2–5.8)
RBC # FLD: 16.8 % — HIGH (ref 10.3–14.5)
RBC # FLD: 16.9 % — HIGH (ref 10.3–14.5)
SODIUM SERPL-SCNC: 136 MMOL/L — SIGNIFICANT CHANGE UP (ref 135–145)
WBC # BLD: 6.17 K/UL — SIGNIFICANT CHANGE UP (ref 3.8–10.5)
WBC # BLD: 6.45 K/UL — SIGNIFICANT CHANGE UP (ref 3.8–10.5)
WBC # FLD AUTO: 6.17 K/UL — SIGNIFICANT CHANGE UP (ref 3.8–10.5)
WBC # FLD AUTO: 6.45 K/UL — SIGNIFICANT CHANGE UP (ref 3.8–10.5)

## 2021-12-31 PROCEDURE — 99232 SBSQ HOSP IP/OBS MODERATE 35: CPT

## 2021-12-31 RX ORDER — ACETAMINOPHEN 500 MG
1000 TABLET ORAL ONCE
Refills: 0 | Status: COMPLETED | OUTPATIENT
Start: 2021-12-31 | End: 2021-12-31

## 2021-12-31 RX ADMIN — CILOSTAZOL 100 MILLIGRAM(S): 100 TABLET ORAL at 17:38

## 2021-12-31 RX ADMIN — Medication 400 MILLIGRAM(S): at 21:37

## 2021-12-31 RX ADMIN — HEPARIN SODIUM 5000 UNIT(S): 5000 INJECTION INTRAVENOUS; SUBCUTANEOUS at 13:25

## 2021-12-31 RX ADMIN — HEPARIN SODIUM 5000 UNIT(S): 5000 INJECTION INTRAVENOUS; SUBCUTANEOUS at 21:19

## 2021-12-31 RX ADMIN — SENNA PLUS 2 TABLET(S): 8.6 TABLET ORAL at 21:19

## 2021-12-31 RX ADMIN — HEPARIN SODIUM 5000 UNIT(S): 5000 INJECTION INTRAVENOUS; SUBCUTANEOUS at 06:27

## 2021-12-31 RX ADMIN — CLOPIDOGREL BISULFATE 75 MILLIGRAM(S): 75 TABLET, FILM COATED ORAL at 11:04

## 2021-12-31 RX ADMIN — CALCITRIOL 0.25 MICROGRAM(S): 0.5 CAPSULE ORAL at 06:26

## 2021-12-31 RX ADMIN — Medication 650 MILLIGRAM(S): at 11:30

## 2021-12-31 RX ADMIN — Medication 25 MILLIGRAM(S): at 08:44

## 2021-12-31 RX ADMIN — Medication 1000 MILLIGRAM(S): at 22:00

## 2021-12-31 RX ADMIN — Medication 650 MILLIGRAM(S): at 04:00

## 2021-12-31 RX ADMIN — ATORVASTATIN CALCIUM 40 MILLIGRAM(S): 80 TABLET, FILM COATED ORAL at 21:19

## 2021-12-31 RX ADMIN — AMLODIPINE BESYLATE 5 MILLIGRAM(S): 2.5 TABLET ORAL at 06:27

## 2021-12-31 RX ADMIN — Medication 81 MILLIGRAM(S): at 11:04

## 2021-12-31 RX ADMIN — CARVEDILOL PHOSPHATE 25 MILLIGRAM(S): 80 CAPSULE, EXTENDED RELEASE ORAL at 17:38

## 2021-12-31 RX ADMIN — Medication 25 MILLIGRAM(S): at 20:05

## 2021-12-31 RX ADMIN — Medication 650 MILLIGRAM(S): at 11:04

## 2021-12-31 RX ADMIN — CILOSTAZOL 100 MILLIGRAM(S): 100 TABLET ORAL at 06:26

## 2021-12-31 RX ADMIN — Medication 650 MILLIGRAM(S): at 03:57

## 2021-12-31 RX ADMIN — POLYETHYLENE GLYCOL 3350 17 GRAM(S): 17 POWDER, FOR SOLUTION ORAL at 11:04

## 2021-12-31 RX ADMIN — CARVEDILOL PHOSPHATE 25 MILLIGRAM(S): 80 CAPSULE, EXTENDED RELEASE ORAL at 06:26

## 2021-12-31 NOTE — PROGRESS NOTE ADULT - SUBJECTIVE AND OBJECTIVE BOX
[facility] Division of Hospital Medicine  Neno PerazaDO  Pager ([facility], 3J-2V): [phone]  Other Times:  [phone]    Patient is a 81y old  Male who presents with a chief complaint of Dizziness, nausea/vomiting (30 Dec 2021 15:55)    SUBJECTIVE / OVERNIGHT EVENTS: No acute events overnight. Patient seen and examined at bedside this morning, states that dizziness and nausea have improved Does endorse constipation today.    REVIEW OF SYSTEMS:    CONSTITUTIONAL: No weakness, fevers or chills  EYES/ENT: No visual changes;  No vertigo or throat pain   NECK: No pain or stiffness  RESPIRATORY: No cough, wheezing, hemoptysis; No shortness of breath  CARDIOVASCULAR: No chest pain or palpitations  GASTROINTESTINAL: No abdominal or epigastric pain. No nausea, vomiting, or hematemesis; Does endorse constipation  GENITOURINARY: No dysuria, frequency or hematuria  NEUROLOGICAL: No numbness or weakness  SKIN: No itching, burning, rashes, or lesions  MSK: No joint pain, no back pain  HEME: No easy bleeding, no easy bruising  All other review of systems is negative unless indicated above.    MEDICATIONS  (STANDING):  amLODIPine   Tablet 5 milliGRAM(s) Oral daily  aspirin enteric coated 81 milliGRAM(s) Oral daily  atorvastatin 40 milliGRAM(s) Oral at bedtime  calcitriol   Capsule 0.25 MICROGram(s) Oral <User Schedule>  carvedilol 25 milliGRAM(s) Oral every 12 hours  cilostazol 100 milliGRAM(s) Oral two times a day  clopidogrel Tablet 75 milliGRAM(s) Oral daily  heparin   Injectable 5000 Unit(s) SubCutaneous every 8 hours  influenza  Vaccine (HIGH DOSE) 0.7 milliLiter(s) IntraMuscular once  lactated ringers. 1000 milliLiter(s) (75 mL/Hr) IV Continuous <Continuous>  meclizine 25 milliGRAM(s) Oral every 12 hours  polyethylene glycol 3350 17 Gram(s) Oral daily  senna 2 Tablet(s) Oral at bedtime    MEDICATIONS  (PRN):  acetaminophen     Tablet .. 650 milliGRAM(s) Oral every 6 hours PRN Temp greater or equal to 38C (100.4F), Mild Pain (1 - 3)  aluminum hydroxide/magnesium hydroxide/simethicone Suspension 30 milliLiter(s) Oral every 6 hours PRN Dyspepsia  ondansetron Injectable 4 milliGRAM(s) IV Push every 8 hours PRN Nausea and/or Vomiting      CAPILLARY BLOOD GLUCOSE        I&O's Summary    30 Dec 2021 07:01  -  31 Dec 2021 07:00  --------------------------------------------------------  IN: 600 mL / OUT: 1040 mL / NET: -440 mL        PHYSICAL EXAM:  Vital Signs Last 24 Hrs  T(C): 36.7 (31 Dec 2021 12:09), Max: 36.8 (30 Dec 2021 20:43)  T(F): 98.1 (31 Dec 2021 12:09), Max: 98.3 (30 Dec 2021 20:43)  HR: 84 (31 Dec 2021 12:09) (81 - 87)  BP: 146/80 (31 Dec 2021 12:09) (123/67 - 146/80)  BP(mean): --  RR: 17 (31 Dec 2021 12:09) (17 - 18)  SpO2: 94% (31 Dec 2021 12:09) (94% - 97%)    CONSTITUTIONAL: Elderly male laying in bed in NAD, well-developed, well-groomed  EYES: EOMI; conjunctiva and sclera clear  RESPIRATORY: Normal respiratory effort; lungs are clear to auscultation bilaterally  CARDIOVASCULAR: Regular rate and rhythm, normal S1 and S2, no murmur/rub/gallop; No lower extremity edema  ABDOMEN: Nontender to palpation, normoactive bowel sounds, no rebound/guarding  MUSCULOSKELETAL: No clubbing or cyanosis of digits; no joint swelling or tenderness to palpation  PSYCH: A+O to person, place, and time; affect appropriate  NEUROLOGY: CN 2-12 are intact and symmetric; no gross sensory deficits   SKIN: No rashes; no palpable lesions    LABS:                        10.1   6.17  )-----------( 138      ( 31 Dec 2021 13:24 )             28.3     12-31    136  |  101  |  25<H>  ----------------------------<  128<H>  3.5   |  21<L>  |  2.01<H>    Ca    8.4      31 Dec 2021 06:58                Culture - Blood (collected 29 Dec 2021 03:56)  Source: .Blood Blood-Peripheral  Preliminary Report (30 Dec 2021 04:01):    No growth to date.    Culture - Blood (collected 29 Dec 2021 03:56)  Source: .Blood Blood-Peripheral  Preliminary Report (30 Dec 2021 04:01):    No growth to date.

## 2022-01-01 LAB
ANION GAP SERPL CALC-SCNC: 14 MMOL/L — SIGNIFICANT CHANGE UP (ref 5–17)
BUN SERPL-MCNC: 26 MG/DL — HIGH (ref 7–23)
CALCIUM SERPL-MCNC: 8.6 MG/DL — SIGNIFICANT CHANGE UP (ref 8.4–10.5)
CHLORIDE SERPL-SCNC: 102 MMOL/L — SIGNIFICANT CHANGE UP (ref 96–108)
CO2 SERPL-SCNC: 21 MMOL/L — LOW (ref 22–31)
CREAT SERPL-MCNC: 2.01 MG/DL — HIGH (ref 0.5–1.3)
CULTURE RESULTS: SIGNIFICANT CHANGE UP
GLUCOSE SERPL-MCNC: 139 MG/DL — HIGH (ref 70–99)
HCT VFR BLD CALC: 29.4 % — LOW (ref 39–50)
HGB BLD-MCNC: 10.3 G/DL — LOW (ref 13–17)
MCHC RBC-ENTMCNC: 35 GM/DL — SIGNIFICANT CHANGE UP (ref 32–36)
MCHC RBC-ENTMCNC: 37.2 PG — HIGH (ref 27–34)
MCV RBC AUTO: 106.1 FL — HIGH (ref 80–100)
NRBC # BLD: 0 /100 WBCS — SIGNIFICANT CHANGE UP (ref 0–0)
PLATELET # BLD AUTO: 160 K/UL — SIGNIFICANT CHANGE UP (ref 150–400)
POTASSIUM SERPL-MCNC: 3.8 MMOL/L — SIGNIFICANT CHANGE UP (ref 3.5–5.3)
POTASSIUM SERPL-SCNC: 3.8 MMOL/L — SIGNIFICANT CHANGE UP (ref 3.5–5.3)
RBC # BLD: 2.77 M/UL — LOW (ref 4.2–5.8)
RBC # FLD: 16.9 % — HIGH (ref 10.3–14.5)
SODIUM SERPL-SCNC: 137 MMOL/L — SIGNIFICANT CHANGE UP (ref 135–145)
SPECIMEN SOURCE: SIGNIFICANT CHANGE UP
WBC # BLD: 7.61 K/UL — SIGNIFICANT CHANGE UP (ref 3.8–10.5)
WBC # FLD AUTO: 7.61 K/UL — SIGNIFICANT CHANGE UP (ref 3.8–10.5)

## 2022-01-01 PROCEDURE — 99232 SBSQ HOSP IP/OBS MODERATE 35: CPT

## 2022-01-01 RX ORDER — OXYCODONE AND ACETAMINOPHEN 5; 325 MG/1; MG/1
1 TABLET ORAL ONCE
Refills: 0 | Status: DISCONTINUED | OUTPATIENT
Start: 2022-01-01 | End: 2022-01-01

## 2022-01-01 RX ADMIN — Medication 25 MILLIGRAM(S): at 08:59

## 2022-01-01 RX ADMIN — Medication 81 MILLIGRAM(S): at 12:01

## 2022-01-01 RX ADMIN — CILOSTAZOL 100 MILLIGRAM(S): 100 TABLET ORAL at 18:01

## 2022-01-01 RX ADMIN — OXYCODONE AND ACETAMINOPHEN 1 TABLET(S): 5; 325 TABLET ORAL at 19:09

## 2022-01-01 RX ADMIN — CLOPIDOGREL BISULFATE 75 MILLIGRAM(S): 75 TABLET, FILM COATED ORAL at 12:04

## 2022-01-01 RX ADMIN — CARVEDILOL PHOSPHATE 25 MILLIGRAM(S): 80 CAPSULE, EXTENDED RELEASE ORAL at 18:02

## 2022-01-01 RX ADMIN — CILOSTAZOL 100 MILLIGRAM(S): 100 TABLET ORAL at 05:49

## 2022-01-01 RX ADMIN — AMLODIPINE BESYLATE 5 MILLIGRAM(S): 2.5 TABLET ORAL at 05:49

## 2022-01-01 RX ADMIN — CARVEDILOL PHOSPHATE 25 MILLIGRAM(S): 80 CAPSULE, EXTENDED RELEASE ORAL at 05:48

## 2022-01-01 RX ADMIN — POLYETHYLENE GLYCOL 3350 17 GRAM(S): 17 POWDER, FOR SOLUTION ORAL at 12:02

## 2022-01-01 RX ADMIN — HEPARIN SODIUM 5000 UNIT(S): 5000 INJECTION INTRAVENOUS; SUBCUTANEOUS at 05:49

## 2022-01-01 RX ADMIN — ONDANSETRON 4 MILLIGRAM(S): 8 TABLET, FILM COATED ORAL at 06:44

## 2022-01-01 RX ADMIN — HEPARIN SODIUM 5000 UNIT(S): 5000 INJECTION INTRAVENOUS; SUBCUTANEOUS at 14:22

## 2022-01-01 RX ADMIN — OXYCODONE AND ACETAMINOPHEN 1 TABLET(S): 5; 325 TABLET ORAL at 20:14

## 2022-01-01 RX ADMIN — Medication 25 MILLIGRAM(S): at 20:08

## 2022-01-01 NOTE — PROGRESS NOTE ADULT - SUBJECTIVE AND OBJECTIVE BOX
[facility] Division of Hospital Medicine  Neno Peraza   Pager ([facility], 3U-2F): [phone]  Other Times:  [phone]    Patient is a 81y old  Male who presents with a chief complaint of Dizziness, nausea/vomiting (31 Dec 2021 15:27)    SUBJECTIVE / OVERNIGHT EVENTS: No acute events overnight. Patient seen and examined at bedside this morning, states that dizziness and nausea have improved. Still endorses constipation today.    REVIEW OF SYSTEMS:    CONSTITUTIONAL: No weakness, fevers or chills  EYES/ENT: No visual changes;  No vertigo or throat pain   NECK: No pain or stiffness  RESPIRATORY: No cough, wheezing, hemoptysis; No shortness of breath  CARDIOVASCULAR: No chest pain or palpitations  GASTROINTESTINAL: No abdominal or epigastric pain. No nausea, vomiting, or hematemesis; Does endorse constipation  GENITOURINARY: No dysuria, frequency or hematuria  NEUROLOGICAL: No numbness or weakness  SKIN: No itching, burning, rashes, or lesions  MSK: No joint pain, no back pain  HEME: No easy bleeding, no easy bruising  All other review of systems is negative unless indicated above.    MEDICATIONS  (STANDING):  amLODIPine   Tablet 5 milliGRAM(s) Oral daily  aspirin enteric coated 81 milliGRAM(s) Oral daily  atorvastatin 40 milliGRAM(s) Oral at bedtime  calcitriol   Capsule 0.25 MICROGram(s) Oral <User Schedule>  carvedilol 25 milliGRAM(s) Oral every 12 hours  cilostazol 100 milliGRAM(s) Oral two times a day  clopidogrel Tablet 75 milliGRAM(s) Oral daily  heparin   Injectable 5000 Unit(s) SubCutaneous every 8 hours  influenza  Vaccine (HIGH DOSE) 0.7 milliLiter(s) IntraMuscular once  lactated ringers. 1000 milliLiter(s) (75 mL/Hr) IV Continuous <Continuous>  meclizine 25 milliGRAM(s) Oral every 12 hours  polyethylene glycol 3350 17 Gram(s) Oral daily  senna 2 Tablet(s) Oral at bedtime    MEDICATIONS  (PRN):  acetaminophen     Tablet .. 650 milliGRAM(s) Oral every 6 hours PRN Temp greater or equal to 38C (100.4F), Mild Pain (1 - 3)  aluminum hydroxide/magnesium hydroxide/simethicone Suspension 30 milliLiter(s) Oral every 6 hours PRN Dyspepsia  ondansetron Injectable 4 milliGRAM(s) IV Push every 8 hours PRN Nausea and/or Vomiting      CAPILLARY BLOOD GLUCOSE        I&O's Summary    01 Jan 2022 07:01  -  01 Jan 2022 14:53  --------------------------------------------------------  IN: 240 mL / OUT: 0 mL / NET: 240 mL        PHYSICAL EXAM:  Vital Signs Last 24 Hrs  T(C): 36.9 (01 Jan 2022 11:07), Max: 36.9 (01 Jan 2022 11:07)  T(F): 98.4 (01 Jan 2022 11:07), Max: 98.4 (01 Jan 2022 11:07)  HR: 61 (01 Jan 2022 11:07) (61 - 86)  BP: 131/71 (01 Jan 2022 11:07) (105/63 - 164/86)  BP(mean): --  RR: 18 (01 Jan 2022 11:07) (18 - 18)  SpO2: 96% (01 Jan 2022 11:07) (93% - 96%)    CONSTITUTIONAL: Elderly male laying in bed in NAD, well-developed, well-groomed  EYES: EOMI; conjunctiva and sclera clear  RESPIRATORY: Normal respiratory effort; lungs are clear to auscultation bilaterally  CARDIOVASCULAR: Regular rate and rhythm, normal S1 and S2, no murmur/rub/gallop; No lower extremity edema  ABDOMEN: Nontender to palpation, normoactive bowel sounds, no rebound/guarding  MUSCULOSKELETAL: No clubbing or cyanosis of digits; no joint swelling or tenderness to palpation  PSYCH: A+O to person, place, and time; affect appropriate  NEUROLOGY: CN 2-12 are intact and symmetric; no gross sensory deficits   SKIN: No rashes; no palpable lesions    LABS:                        10.3   7.61  )-----------( 160      ( 01 Jan 2022 07:19 )             29.4     01-01    137  |  102  |  26<H>  ----------------------------<  139<H>  3.8   |  21<L>  |  2.01<H>    Ca    8.6      01 Jan 2022 07:23

## 2022-01-02 LAB
ALBUMIN SERPL ELPH-MCNC: 3.7 G/DL — SIGNIFICANT CHANGE UP (ref 3.3–5)
ALP SERPL-CCNC: 63 U/L — SIGNIFICANT CHANGE UP (ref 40–120)
ALT FLD-CCNC: 6 U/L — LOW (ref 10–45)
ANION GAP SERPL CALC-SCNC: 15 MMOL/L — SIGNIFICANT CHANGE UP (ref 5–17)
AST SERPL-CCNC: 9 U/L — LOW (ref 10–40)
BILIRUB SERPL-MCNC: 1 MG/DL — SIGNIFICANT CHANGE UP (ref 0.2–1.2)
BUN SERPL-MCNC: 31 MG/DL — HIGH (ref 7–23)
CALCIUM SERPL-MCNC: 8.4 MG/DL — SIGNIFICANT CHANGE UP (ref 8.4–10.5)
CHLORIDE SERPL-SCNC: 100 MMOL/L — SIGNIFICANT CHANGE UP (ref 96–108)
CO2 SERPL-SCNC: 20 MMOL/L — LOW (ref 22–31)
CREAT ?TM UR-MCNC: 137 MG/DL — SIGNIFICANT CHANGE UP
CREAT SERPL-MCNC: 2.3 MG/DL — HIGH (ref 0.5–1.3)
GLUCOSE SERPL-MCNC: 136 MG/DL — HIGH (ref 70–99)
HCT VFR BLD CALC: 28.2 % — LOW (ref 39–50)
HGB BLD-MCNC: 9.7 G/DL — LOW (ref 13–17)
MAGNESIUM SERPL-MCNC: 2 MG/DL — SIGNIFICANT CHANGE UP (ref 1.6–2.6)
MCHC RBC-ENTMCNC: 34.4 GM/DL — SIGNIFICANT CHANGE UP (ref 32–36)
MCHC RBC-ENTMCNC: 37.5 PG — HIGH (ref 27–34)
MCV RBC AUTO: 108.9 FL — HIGH (ref 80–100)
NRBC # BLD: 0 /100 WBCS — SIGNIFICANT CHANGE UP (ref 0–0)
PHOSPHATE SERPL-MCNC: 3.6 MG/DL — SIGNIFICANT CHANGE UP (ref 2.5–4.5)
PLATELET # BLD AUTO: 144 K/UL — LOW (ref 150–400)
POTASSIUM SERPL-MCNC: 4 MMOL/L — SIGNIFICANT CHANGE UP (ref 3.5–5.3)
POTASSIUM SERPL-SCNC: 4 MMOL/L — SIGNIFICANT CHANGE UP (ref 3.5–5.3)
PROT SERPL-MCNC: 6.4 G/DL — SIGNIFICANT CHANGE UP (ref 6–8.3)
RBC # BLD: 2.59 M/UL — LOW (ref 4.2–5.8)
RBC # FLD: 17.2 % — HIGH (ref 10.3–14.5)
SARS-COV-2 RNA SPEC QL NAA+PROBE: SIGNIFICANT CHANGE UP
SODIUM SERPL-SCNC: 135 MMOL/L — SIGNIFICANT CHANGE UP (ref 135–145)
SODIUM UR-SCNC: 16 MMOL/L — SIGNIFICANT CHANGE UP
WBC # BLD: 6.97 K/UL — SIGNIFICANT CHANGE UP (ref 3.8–10.5)
WBC # FLD AUTO: 6.97 K/UL — SIGNIFICANT CHANGE UP (ref 3.8–10.5)

## 2022-01-02 PROCEDURE — 99232 SBSQ HOSP IP/OBS MODERATE 35: CPT

## 2022-01-02 RX ORDER — ACETAMINOPHEN 500 MG
1000 TABLET ORAL ONCE
Refills: 0 | Status: COMPLETED | OUTPATIENT
Start: 2022-01-02 | End: 2022-01-02

## 2022-01-02 RX ORDER — SODIUM CHLORIDE 9 MG/ML
1000 INJECTION, SOLUTION INTRAVENOUS
Refills: 0 | Status: COMPLETED | OUTPATIENT
Start: 2022-01-02 | End: 2022-01-02

## 2022-01-02 RX ORDER — SODIUM CHLORIDE 0.65 %
1 AEROSOL, SPRAY (ML) NASAL EVERY 12 HOURS
Refills: 0 | Status: DISCONTINUED | OUTPATIENT
Start: 2022-01-02 | End: 2022-01-05

## 2022-01-02 RX ADMIN — SENNA PLUS 2 TABLET(S): 8.6 TABLET ORAL at 21:25

## 2022-01-02 RX ADMIN — ATORVASTATIN CALCIUM 40 MILLIGRAM(S): 80 TABLET, FILM COATED ORAL at 21:25

## 2022-01-02 RX ADMIN — HEPARIN SODIUM 5000 UNIT(S): 5000 INJECTION INTRAVENOUS; SUBCUTANEOUS at 05:28

## 2022-01-02 RX ADMIN — Medication 25 MILLIGRAM(S): at 21:24

## 2022-01-02 RX ADMIN — CILOSTAZOL 100 MILLIGRAM(S): 100 TABLET ORAL at 17:17

## 2022-01-02 RX ADMIN — Medication 1000 MILLIGRAM(S): at 06:54

## 2022-01-02 RX ADMIN — Medication 25 MILLIGRAM(S): at 08:32

## 2022-01-02 RX ADMIN — SODIUM CHLORIDE 100 MILLILITER(S): 9 INJECTION, SOLUTION INTRAVENOUS at 16:47

## 2022-01-02 RX ADMIN — Medication 400 MILLIGRAM(S): at 05:27

## 2022-01-02 RX ADMIN — CILOSTAZOL 100 MILLIGRAM(S): 100 TABLET ORAL at 05:28

## 2022-01-02 RX ADMIN — HEPARIN SODIUM 5000 UNIT(S): 5000 INJECTION INTRAVENOUS; SUBCUTANEOUS at 13:12

## 2022-01-02 RX ADMIN — CLOPIDOGREL BISULFATE 75 MILLIGRAM(S): 75 TABLET, FILM COATED ORAL at 12:11

## 2022-01-02 RX ADMIN — HEPARIN SODIUM 5000 UNIT(S): 5000 INJECTION INTRAVENOUS; SUBCUTANEOUS at 21:25

## 2022-01-02 RX ADMIN — Medication 81 MILLIGRAM(S): at 12:11

## 2022-01-02 RX ADMIN — CARVEDILOL PHOSPHATE 25 MILLIGRAM(S): 80 CAPSULE, EXTENDED RELEASE ORAL at 17:18

## 2022-01-02 RX ADMIN — POLYETHYLENE GLYCOL 3350 17 GRAM(S): 17 POWDER, FOR SOLUTION ORAL at 12:10

## 2022-01-02 RX ADMIN — Medication 1 SPRAY(S): at 17:18

## 2022-01-02 RX ADMIN — AMLODIPINE BESYLATE 5 MILLIGRAM(S): 2.5 TABLET ORAL at 05:28

## 2022-01-02 RX ADMIN — CARVEDILOL PHOSPHATE 25 MILLIGRAM(S): 80 CAPSULE, EXTENDED RELEASE ORAL at 05:28

## 2022-01-02 NOTE — PROGRESS NOTE ADULT - SUBJECTIVE AND OBJECTIVE BOX
NEPHROLOGY     Patient seen and examined.    MEDICATIONS  (STANDING):  amLODIPine   Tablet 5 milliGRAM(s) Oral daily  aspirin enteric coated 81 milliGRAM(s) Oral daily  atorvastatin 40 milliGRAM(s) Oral at bedtime  calcitriol   Capsule 0.25 MICROGram(s) Oral <User Schedule>  carvedilol 25 milliGRAM(s) Oral every 12 hours  cilostazol 100 milliGRAM(s) Oral two times a day  clopidogrel Tablet 75 milliGRAM(s) Oral daily  heparin   Injectable 5000 Unit(s) SubCutaneous every 8 hours  influenza  Vaccine (HIGH DOSE) 0.7 milliLiter(s) IntraMuscular once  lactated ringers. 1000 milliLiter(s) (100 mL/Hr) IV Continuous <Continuous>  meclizine 25 milliGRAM(s) Oral every 12 hours  polyethylene glycol 3350 17 Gram(s) Oral daily  senna 2 Tablet(s) Oral at bedtime  sodium chloride 0.65% Nasal 1 Spray(s) Both Nostrils every 12 hours    VITALS:  T(C): , Max: 37.1 (01-02-22 @ 12:43)  T(F): , Max: 98.7 (01-02-22 @ 12:43)  HR: 96 (01-02-22 @ 12:43)  BP: 118/67 (01-02-22 @ 12:43)  RR: 18 (01-02-22 @ 12:43)  SpO2: 92% (01-02-22 @ 12:43)    I and O's:    01-01 @ 07:01  -  01-02 @ 07:00  --------------------------------------------------------  IN: 360 mL / OUT: 700 mL / NET: -340 mL    PHYSICAL EXAM:      LABS:                        9.7    6.97  )-----------( 144      ( 02 Jan 2022 07:12 )             28.2     01-02    135  |  100  |  31<H>  ----------------------------<  136<H>  4.0   |  20<L>  |  2.30<H>    Ca    8.4      02 Jan 2022 07:11  Phos  3.6     01-02  Mg     2.0     01-02    TPro  6.4  /  Alb  3.7  /  TBili  1.0  /  DBili  x   /  AST  9<L>  /  ALT  6<L>  /  AlkPhos  63  01-02    Urine Studies:    Creatinine, Random Urine: 137 mg/dL (01-02 @ 10:00)  Sodium, Random Urine: 16 mmol/L (01-02 @ 10:00)   NEPHROLOGY     Patient seen and examined. Pt reports feeling ok, dizziness is improving, though continues to reports of leg pain, currently in no acute distress.     MEDICATIONS  (STANDING):  amLODIPine   Tablet 5 milliGRAM(s) Oral daily  aspirin enteric coated 81 milliGRAM(s) Oral daily  atorvastatin 40 milliGRAM(s) Oral at bedtime  calcitriol   Capsule 0.25 MICROGram(s) Oral <User Schedule>  carvedilol 25 milliGRAM(s) Oral every 12 hours  cilostazol 100 milliGRAM(s) Oral two times a day  clopidogrel Tablet 75 milliGRAM(s) Oral daily  heparin   Injectable 5000 Unit(s) SubCutaneous every 8 hours  influenza  Vaccine (HIGH DOSE) 0.7 milliLiter(s) IntraMuscular once  lactated ringers. 1000 milliLiter(s) (100 mL/Hr) IV Continuous <Continuous>  meclizine 25 milliGRAM(s) Oral every 12 hours  polyethylene glycol 3350 17 Gram(s) Oral daily  senna 2 Tablet(s) Oral at bedtime  sodium chloride 0.65% Nasal 1 Spray(s) Both Nostrils every 12 hours    VITALS:  T(C): , Max: 37.1 (01-02-22 @ 12:43)  T(F): , Max: 98.7 (01-02-22 @ 12:43)  HR: 96 (01-02-22 @ 12:43)  BP: 118/67 (01-02-22 @ 12:43)  RR: 18 (01-02-22 @ 12:43)  SpO2: 92% (01-02-22 @ 12:43)    I and O's:    01-01 @ 07:01  -  01-02 @ 07:00  --------------------------------------------------------  IN: 360 mL / OUT: 700 mL / NET: -340 mL    PHYSICAL EXAM:  Constitutional: NAD, Alert  HEENT: NCAT, DMM  Neck: Supple, No JVD  Respiratory: CTA-b/l  Cardiovascular: RRR s1s2, no m/r/g  Gastrointestinal: BS+, soft, NT/ND  Extremities: No peripheral edema b/l  Neurological: no focal deficits; strength grossly intact  Back: no CVAT b/l  Skin: No rashes, no nevi    LABS:                        9.7    6.97  )-----------( 144      ( 02 Jan 2022 07:12 )             28.2     01-02    135  |  100  |  31<H>  ----------------------------<  136<H>  4.0   |  20<L>  |  2.30<H>    Ca    8.4      02 Jan 2022 07:11  Phos  3.6     01-02  Mg     2.0     01-02    TPro  6.4  /  Alb  3.7  /  TBili  1.0  /  DBili  x   /  AST  9<L>  /  ALT  6<L>  /  AlkPhos  63  01-02    Urine Studies:    Creatinine, Random Urine: 137 mg/dL (01-02 @ 10:00)  Sodium, Random Urine: 16 mmol/L (01-02 @ 10:00)

## 2022-01-02 NOTE — PROGRESS NOTE ADULT - ASSESSMENT
IMPRESSION: 81M w/ HTN, USHA, and CKD 3-4, 12/26/21 p/w night sweats, vomiting, and vertigo    (1)Renal - CKD3-4 - due to renal artery stenosis. Creatinine slightly higher     (2)CV - resolved hypovolemia, diuretic discontinued     (3ID - GPR from [facility] 12/26 - contaminant? ID on board , monitor off abx    (4)Neuro - vertigo - etiology? - vestibular rehab outpatient    RECOMMEND:  (1)IVF: LR as ordered for now  (2)Prn Meclizine  (3)BMP in the am    D/w Dr. Zachariah Wilson, NP-C  [facility]  [phone]

## 2022-01-02 NOTE — PROGRESS NOTE ADULT - SUBJECTIVE AND OBJECTIVE BOX
[facility] Division of Hospital Medicine  Neno DestinyDO jeremiah  Pager ([facility], 9N-2W): [phone]  Other Times:  [phone]    Patient is a 81y old  Male who presents with a chief complaint of Dizziness, nausea/vomiting (01 Jan 2022 14:53)    SUBJECTIVE / OVERNIGHT EVENTS: No acute events overnight. Patient seen and examined at bedside this morning, complaining of back and leg pain (chronic). Dizziness has improved with meclizine.    REVIEW OF SYSTEMS:    CONSTITUTIONAL: No weakness, fevers or chills  EYES/ENT: No visual changes;  No vertigo or throat pain   NECK: No pain or stiffness  RESPIRATORY: No cough, wheezing, hemoptysis; No shortness of breath  CARDIOVASCULAR: No chest pain or palpitations  GASTROINTESTINAL: No abdominal or epigastric pain. No nausea, vomiting, or hematemesis; No diarrhea or constipation. No melena or hematochezia.  GENITOURINARY: No dysuria, frequency or hematuria  NEUROLOGICAL: No numbness or weakness  SKIN: No itching, burning, rashes, or lesions  MSK: Endorses back pain, bilateral lower extremity pain  HEME: No easy bleeding, no easy bruising  All other review of systems is negative unless indicated above.    MEDICATIONS  (STANDING):  amLODIPine   Tablet 5 milliGRAM(s) Oral daily  aspirin enteric coated 81 milliGRAM(s) Oral daily  atorvastatin 40 milliGRAM(s) Oral at bedtime  calcitriol   Capsule 0.25 MICROGram(s) Oral <User Schedule>  carvedilol 25 milliGRAM(s) Oral every 12 hours  cilostazol 100 milliGRAM(s) Oral two times a day  clopidogrel Tablet 75 milliGRAM(s) Oral daily  heparin   Injectable 5000 Unit(s) SubCutaneous every 8 hours  influenza  Vaccine (HIGH DOSE) 0.7 milliLiter(s) IntraMuscular once  lactated ringers. 1000 milliLiter(s) (100 mL/Hr) IV Continuous <Continuous>  meclizine 25 milliGRAM(s) Oral every 12 hours  polyethylene glycol 3350 17 Gram(s) Oral daily  senna 2 Tablet(s) Oral at bedtime  sodium chloride 0.65% Nasal 1 Spray(s) Both Nostrils every 12 hours    MEDICATIONS  (PRN):  acetaminophen     Tablet .. 650 milliGRAM(s) Oral every 6 hours PRN Temp greater or equal to 38C (100.4F), Mild Pain (1 - 3)  aluminum hydroxide/magnesium hydroxide/simethicone Suspension 30 milliLiter(s) Oral every 6 hours PRN Dyspepsia  ondansetron Injectable 4 milliGRAM(s) IV Push every 8 hours PRN Nausea and/or Vomiting      CAPILLARY BLOOD GLUCOSE        I&O's Summary    01 Jan 2022 07:01  -  02 Jan 2022 07:00  --------------------------------------------------------  IN: 360 mL / OUT: 700 mL / NET: -340 mL        PHYSICAL EXAM:  Vital Signs Last 24 Hrs  T(C): 37.1 (02 Jan 2022 12:43), Max: 37.1 (02 Jan 2022 12:43)  T(F): 98.7 (02 Jan 2022 12:43), Max: 98.7 (02 Jan 2022 12:43)  HR: 96 (02 Jan 2022 12:43) (84 - 96)  BP: 118/67 (02 Jan 2022 12:43) (118/67 - 148/76)  BP(mean): --  RR: 18 (02 Jan 2022 12:43) (18 - 19)  SpO2: 92% (02 Jan 2022 12:43) (92% - 95%)    CONSTITUTIONAL: Elderly male laying in bed in NAD, well-developed, well-groomed  EYES: EOMI; conjunctiva and sclera clear  RESPIRATORY: Normal respiratory effort; lungs are clear to auscultation bilaterally  CARDIOVASCULAR: Regular rate and rhythm, normal S1 and S2, no murmur/rub/gallop; No lower extremity edema  ABDOMEN: Nontender to palpation, normoactive bowel sounds, no rebound/guarding  MUSCULOSKELETAL: No clubbing or cyanosis of digits; no joint swelling or tenderness to palpation  PSYCH: A+O to person, place, and time; affect appropriate  NEUROLOGY: CN 2-12 are intact and symmetric; no gross sensory deficits   SKIN: No rashes; no palpable lesions    LABS:                        9.7    6.97  )-----------( 144      ( 02 Jan 2022 07:12 )             28.2     01-02    135  |  100  |  31<H>  ----------------------------<  136<H>  4.0   |  20<L>  |  2.30<H>    Ca    8.4      02 Jan 2022 07:11  Phos  3.6     01-02  Mg     2.0     01-02    TPro  6.4  /  Alb  3.7  /  TBili  1.0  /  DBili  x   /  AST  9<L>  /  ALT  6<L>  /  AlkPhos  63  01-02

## 2022-01-03 LAB
ALBUMIN SERPL ELPH-MCNC: 3.5 G/DL — SIGNIFICANT CHANGE UP (ref 3.3–5)
ALP SERPL-CCNC: 62 U/L — SIGNIFICANT CHANGE UP (ref 40–120)
ALT FLD-CCNC: 6 U/L — LOW (ref 10–45)
ANION GAP SERPL CALC-SCNC: 14 MMOL/L — SIGNIFICANT CHANGE UP (ref 5–17)
AST SERPL-CCNC: 5 U/L — LOW (ref 10–40)
BILIRUB SERPL-MCNC: 0.8 MG/DL — SIGNIFICANT CHANGE UP (ref 0.2–1.2)
BUN SERPL-MCNC: 33 MG/DL — HIGH (ref 7–23)
CALCIUM SERPL-MCNC: 8.4 MG/DL — SIGNIFICANT CHANGE UP (ref 8.4–10.5)
CHLORIDE SERPL-SCNC: 101 MMOL/L — SIGNIFICANT CHANGE UP (ref 96–108)
CO2 SERPL-SCNC: 21 MMOL/L — LOW (ref 22–31)
CREAT SERPL-MCNC: 2.35 MG/DL — HIGH (ref 0.5–1.3)
CULTURE RESULTS: SIGNIFICANT CHANGE UP
CULTURE RESULTS: SIGNIFICANT CHANGE UP
GLUCOSE SERPL-MCNC: 151 MG/DL — HIGH (ref 70–99)
HCT VFR BLD CALC: 25.1 % — LOW (ref 39–50)
HGB BLD-MCNC: 8.6 G/DL — LOW (ref 13–17)
MAGNESIUM SERPL-MCNC: 2.3 MG/DL — SIGNIFICANT CHANGE UP (ref 1.6–2.6)
MCHC RBC-ENTMCNC: 34.3 GM/DL — SIGNIFICANT CHANGE UP (ref 32–36)
MCHC RBC-ENTMCNC: 37.4 PG — HIGH (ref 27–34)
MCV RBC AUTO: 109.1 FL — HIGH (ref 80–100)
NRBC # BLD: 0 /100 WBCS — SIGNIFICANT CHANGE UP (ref 0–0)
PHOSPHATE SERPL-MCNC: 3.6 MG/DL — SIGNIFICANT CHANGE UP (ref 2.5–4.5)
PLATELET # BLD AUTO: 129 K/UL — LOW (ref 150–400)
POTASSIUM SERPL-MCNC: 4.1 MMOL/L — SIGNIFICANT CHANGE UP (ref 3.5–5.3)
POTASSIUM SERPL-SCNC: 4.1 MMOL/L — SIGNIFICANT CHANGE UP (ref 3.5–5.3)
PROT SERPL-MCNC: 6 G/DL — SIGNIFICANT CHANGE UP (ref 6–8.3)
RBC # BLD: 2.3 M/UL — LOW (ref 4.2–5.8)
RBC # FLD: 17 % — HIGH (ref 10.3–14.5)
SODIUM SERPL-SCNC: 136 MMOL/L — SIGNIFICANT CHANGE UP (ref 135–145)
SPECIMEN SOURCE: SIGNIFICANT CHANGE UP
SPECIMEN SOURCE: SIGNIFICANT CHANGE UP
WBC # BLD: 6.1 K/UL — SIGNIFICANT CHANGE UP (ref 3.8–10.5)
WBC # FLD AUTO: 6.1 K/UL — SIGNIFICANT CHANGE UP (ref 3.8–10.5)

## 2022-01-03 PROCEDURE — 99232 SBSQ HOSP IP/OBS MODERATE 35: CPT

## 2022-01-03 RX ORDER — LANOLIN ALCOHOL/MO/W.PET/CERES
3 CREAM (GRAM) TOPICAL ONCE
Refills: 0 | Status: COMPLETED | OUTPATIENT
Start: 2022-01-03 | End: 2022-01-03

## 2022-01-03 RX ADMIN — CARVEDILOL PHOSPHATE 25 MILLIGRAM(S): 80 CAPSULE, EXTENDED RELEASE ORAL at 05:27

## 2022-01-03 RX ADMIN — AMLODIPINE BESYLATE 5 MILLIGRAM(S): 2.5 TABLET ORAL at 05:27

## 2022-01-03 RX ADMIN — Medication 81 MILLIGRAM(S): at 11:55

## 2022-01-03 RX ADMIN — CILOSTAZOL 100 MILLIGRAM(S): 100 TABLET ORAL at 17:32

## 2022-01-03 RX ADMIN — CLOPIDOGREL BISULFATE 75 MILLIGRAM(S): 75 TABLET, FILM COATED ORAL at 11:55

## 2022-01-03 RX ADMIN — HEPARIN SODIUM 5000 UNIT(S): 5000 INJECTION INTRAVENOUS; SUBCUTANEOUS at 21:10

## 2022-01-03 RX ADMIN — Medication 1 SPRAY(S): at 17:32

## 2022-01-03 RX ADMIN — CALCITRIOL 0.25 MICROGRAM(S): 0.5 CAPSULE ORAL at 05:27

## 2022-01-03 RX ADMIN — ATORVASTATIN CALCIUM 40 MILLIGRAM(S): 80 TABLET, FILM COATED ORAL at 21:09

## 2022-01-03 RX ADMIN — CARVEDILOL PHOSPHATE 25 MILLIGRAM(S): 80 CAPSULE, EXTENDED RELEASE ORAL at 17:32

## 2022-01-03 RX ADMIN — CILOSTAZOL 100 MILLIGRAM(S): 100 TABLET ORAL at 05:27

## 2022-01-03 RX ADMIN — HEPARIN SODIUM 5000 UNIT(S): 5000 INJECTION INTRAVENOUS; SUBCUTANEOUS at 05:27

## 2022-01-03 RX ADMIN — Medication 25 MILLIGRAM(S): at 21:10

## 2022-01-03 RX ADMIN — Medication 25 MILLIGRAM(S): at 08:18

## 2022-01-03 RX ADMIN — HEPARIN SODIUM 5000 UNIT(S): 5000 INJECTION INTRAVENOUS; SUBCUTANEOUS at 13:30

## 2022-01-03 RX ADMIN — POLYETHYLENE GLYCOL 3350 17 GRAM(S): 17 POWDER, FOR SOLUTION ORAL at 11:56

## 2022-01-03 RX ADMIN — Medication 3 MILLIGRAM(S): at 22:24

## 2022-01-03 NOTE — CHART NOTE - NSCHARTNOTEFT_GEN_A_CORE
81M with hx prostate CA, HLD, HTN, PAD, renal artery stenosis s/p left ptra, CKD stage 3-4, chronic back pain and spondylosis p/w positional dizziness. Dizziness started when he rolled over in bed. Is worse when moving, better when lying down on his left side. On exam he has right beating nystagmus, slow phase to the left and none on left gaze. He feels the dizziness when sat  up but subsides after a few seconds. Unable to do Eply and HIT exam due to back stiffness and pain.     Impression: Pt with BPPV    Please send script for vestibular rehab.   Discharged planning as per primary team.

## 2022-01-03 NOTE — PROGRESS NOTE ADULT - SUBJECTIVE AND OBJECTIVE BOX
[facility] Division of Hospital Medicine  Neno DestinyDO jeremiah  Pager ([facility], 5I-0J): [phone]  Other Times:  [phone]    Patient is a 81y old  Male who presents with a chief complaint of Dizziness, nausea/vomiting (03 Jan 2022 10:23)    SUBJECTIVE / OVERNIGHT EVENTS: No acute events overnight. Patient seen and examined at bedside this morning, endorses chronic back and leg pain, states that dizziness and nausea has improved.    REVIEW OF SYSTEMS:    CONSTITUTIONAL: No weakness, fevers or chills  EYES/ENT: No visual changes;  No vertigo or throat pain   NECK: No pain or stiffness  RESPIRATORY: No cough, wheezing, hemoptysis; No shortness of breath  CARDIOVASCULAR: No chest pain or palpitations  GASTROINTESTINAL: No abdominal or epigastric pain. No nausea, vomiting, or hematemesis; No diarrhea or constipation. No melena or hematochezia.  GENITOURINARY: No dysuria, frequency or hematuria  NEUROLOGICAL: No numbness or weakness  SKIN: No itching, burning, rashes, or lesions  MSK: Endorses back pain, bilateral lower extremity pain  HEME: No easy bleeding, no easy bruising  All other review of systems is negative unless indicated above.    MEDICATIONS  (STANDING):  amLODIPine   Tablet 5 milliGRAM(s) Oral daily  aspirin enteric coated 81 milliGRAM(s) Oral daily  atorvastatin 40 milliGRAM(s) Oral at bedtime  calcitriol   Capsule 0.25 MICROGram(s) Oral <User Schedule>  carvedilol 25 milliGRAM(s) Oral every 12 hours  cilostazol 100 milliGRAM(s) Oral two times a day  clopidogrel Tablet 75 milliGRAM(s) Oral daily  heparin   Injectable 5000 Unit(s) SubCutaneous every 8 hours  influenza  Vaccine (HIGH DOSE) 0.7 milliLiter(s) IntraMuscular once  meclizine 25 milliGRAM(s) Oral every 12 hours  polyethylene glycol 3350 17 Gram(s) Oral daily  senna 2 Tablet(s) Oral at bedtime  sodium chloride 0.65% Nasal 1 Spray(s) Both Nostrils every 12 hours    MEDICATIONS  (PRN):  acetaminophen     Tablet .. 650 milliGRAM(s) Oral every 6 hours PRN Temp greater or equal to 38C (100.4F), Mild Pain (1 - 3)  aluminum hydroxide/magnesium hydroxide/simethicone Suspension 30 milliLiter(s) Oral every 6 hours PRN Dyspepsia  ondansetron Injectable 4 milliGRAM(s) IV Push every 8 hours PRN Nausea and/or Vomiting      CAPILLARY BLOOD GLUCOSE        I&O's Summary    02 Jan 2022 07:01  -  03 Jan 2022 07:00  --------------------------------------------------------  IN: 0 mL / OUT: 200 mL / NET: -200 mL        PHYSICAL EXAM:  Vital Signs Last 24 Hrs  T(C): 37.1 (03 Jan 2022 04:22), Max: 37.1 (03 Jan 2022 04:22)  T(F): 98.7 (03 Jan 2022 04:22), Max: 98.7 (03 Jan 2022 04:22)  HR: 91 (03 Jan 2022 04:22) (91 - 98)  BP: 147/74 (03 Jan 2022 04:22) (107/70 - 147/74)  BP(mean): --  RR: 18 (03 Jan 2022 04:22) (18 - 18)  SpO2: 95% (03 Jan 2022 04:22) (95% - 95%)    CONSTITUTIONAL: Elderly male laying in bed in NAD, well-developed, well-groomed  EYES: EOMI; conjunctiva and sclera clear  ENMT: Moist oral mucosa, no pharyngeal injection or exudates  RESPIRATORY: Normal respiratory effort; lungs are clear to auscultation bilaterally  CARDIOVASCULAR: Regular rate and rhythm, normal S1 and S2, systolic murmur noted; No lower extremity edema  ABDOMEN: Nontender to palpation, normoactive bowel sounds, no rebound/guarding  MUSCULOSKELETAL: No clubbing or cyanosis of digits; no joint swelling or tenderness to palpation  PSYCH: A+O to person, place, and time; affect appropriate  NEUROLOGY: CN 2-12 are intact and symmetric; no gross sensory deficits   SKIN: No rashes; no palpable lesions    LABS:                        8.6    6.10  )-----------( 129      ( 03 Jan 2022 07:11 )             25.1     01-03    136  |  101  |  33<H>  ----------------------------<  151<H>  4.1   |  21<L>  |  2.35<H>    Ca    8.4      03 Jan 2022 07:06  Phos  3.6     01-03  Mg     2.3     01-03    TPro  6.0  /  Alb  3.5  /  TBili  0.8  /  DBili  x   /  AST  5<L>  /  ALT  6<L>  /  AlkPhos  62  01-03

## 2022-01-03 NOTE — PROGRESS NOTE ADULT - ASSESSMENT
Assessment:  1. Renal artery stenosis s/p left PTA  2. PAD  3. HTN  4. HLD  5. History of Prostate CA  6. CKD stage 3-4  7. Vertigo    Plan:  1. TTE reviewed showing normal wall motion, preserved LVEF, mild AS.  2. Remains hemodynamically stable.   3. No issues with his prior renal artery intervention.  4. Continue ASA/Plavix, and Pletal. Continue Statin.  5. BP stable on Norvasc and Coreg.   6. Continue Heparin Sub. Cut. for DVT PPX.  7. Continue PT.  8. Further recommendations to follow during afternoon rounds.     Thank you  ASHLYN Garduno, MPAS  Vascular Cardiology Service  Please call with any questions:   [phone]   Assessment:  1. Renal artery stenosis s/p left PTA  2. PAD  3. HTN  4. HLD  5. History of Prostate CA  6. CKD stage 3-4  7. Vertigo    Plan:  1. TTE reviewed showing normal wall motion, preserved LVEF, mild AS.  2. Remains hemodynamically stable.   3. No issues with his prior renal artery intervention.  4. Continue ASA/Plavix, and Pletal. Continue Statin.  5. BP stable on Norvasc and Coreg.   6. Continue Heparin Sub. Cut. for DVT PPX.  7. Continue PT.  8. Dizziness / vertigo management as per neurology and primary team.     Thank you  ASHLYN Garduno, MPAS  Vascular Cardiology Service  Please call with any questions:   [phone]

## 2022-01-03 NOTE — PROGRESS NOTE ADULT - NSPROGADDITIONALINFOA_GEN_ALL_CORE
Discussed with patient, [facility] ACP, daughter Digna, Dr. Reddy and Dr. Cordova.

## 2022-01-03 NOTE — PROGRESS NOTE ADULT - SUBJECTIVE AND OBJECTIVE BOX
Overnight events noted      VITAL:  T(C): , Max: 37.1 (01-02-22 @ 12:43)  T(F): , Max: 98.7 (01-02-22 @ 12:43)  HR: 91 (01-03-22 @ 04:22)  BP: 147/74 (01-03-22 @ 04:22)  BP(mean): --  RR: 18 (01-03-22 @ 04:22)  SpO2: 95% (01-03-22 @ 04:22)    PHYSICAL EXAM:  Constitutional: NAD, Alert  HEENT: NCAT, DMM  Neck: Supple, No JVD  Respiratory: CTA-b/l  Cardiovascular: RRR s1s2, no m/r/g  Gastrointestinal: BS+, soft, NT/ND  Extremities: No peripheral edema b/l  Neurological: no focal deficits; strength grossly intact  Back: no CVAT b/l  Skin: No rashes, no nevi    LABS:                        8.6    6.10  )-----------( 129      ( 03 Jan 2022 07:11 )             25.1     Na(136)/K(4.1)/Cl(101)/HCO3(21)/BUN(33)/Cr(2.35)Glu(151)/Ca(8.4)/Mg(2.3)/PO4(3.6)    01-03 @ 07:06  Na(135)/K(4.0)/Cl(100)/HCO3(20)/BUN(31)/Cr(2.30)Glu(136)/Ca(8.4)/Mg(2.0)/PO4(3.6)    01-02 @ 07:11  Na(137)/K(3.8)/Cl(102)/HCO3(21)/BUN(26)/Cr(2.01)Glu(139)/Ca(8.6)/Mg(--)/PO4(--)    01-01 @ 07:23    Creatinine, Random Urine: 137 mg/dL (01-02 @ 10:00)  Sodium, Random Urine: 16 mmol/L (01-02 @ 10:00)        IMPRESSION: 81M w/ HTN, USHA, and CKD 3-4, 12/26/21 p/w night sweats, vomiting, and vertigo    (1)Renal - CKD3-4 - due to renal artery stenosis. Fluctuating numbers based on hemodynamic status    (2)Neuro - vertigo - improved with Meclizine    (3)HTN - BP acceptable    RECOMMEND:  (1)Meds as ordered  (2)D/C planning per primary team; f/u at my office as previously planned        Edis Cabral MD  [facility]  Office: [phone]  Cell: [phone]       No dizziness or nausea at rest; (+)symptoms triggered by movement      VITAL:  T(C): , Max: 37.1 (01-02-22 @ 12:43)  T(F): , Max: 98.7 (01-02-22 @ 12:43)  HR: 91 (01-03-22 @ 04:22)  BP: 147/74 (01-03-22 @ 04:22)  BP(mean): --  RR: 18 (01-03-22 @ 04:22)  SpO2: 95% (01-03-22 @ 04:22)    PHYSICAL EXAM:  Constitutional: NAD, Alert  HEENT: NCAT, DMM  Neck: Supple, No JVD  Respiratory: CTA-b/l  Cardiovascular: RRR s1s2, no m/r/g  Gastrointestinal: BS+, soft, NT/ND  Extremities: No peripheral edema b/l  Neurological: no focal deficits; strength grossly intact  Back: no CVAT b/l  Skin: No rashes, no nevi    LABS:                        8.6    6.10  )-----------( 129      ( 03 Jan 2022 07:11 )             25.1     Na(136)/K(4.1)/Cl(101)/HCO3(21)/BUN(33)/Cr(2.35)Glu(151)/Ca(8.4)/Mg(2.3)/PO4(3.6)    01-03 @ 07:06  Na(135)/K(4.0)/Cl(100)/HCO3(20)/BUN(31)/Cr(2.30)Glu(136)/Ca(8.4)/Mg(2.0)/PO4(3.6)    01-02 @ 07:11  Na(137)/K(3.8)/Cl(102)/HCO3(21)/BUN(26)/Cr(2.01)Glu(139)/Ca(8.6)/Mg(--)/PO4(--)    01-01 @ 07:23    Creatinine, Random Urine: 137 mg/dL (01-02 @ 10:00)  Sodium, Random Urine: 16 mmol/L (01-02 @ 10:00)        IMPRESSION: 81M w/ HTN, USHA, and CKD 3-4, 12/26/21 p/w night sweats, vomiting, and vertigo    (1)Renal - CKD3-4 - due to renal artery stenosis. Fluctuating numbers based on hemodynamic status    (2)Neuro - vertigo - improved with Meclizine    (3)HTN - BP acceptable      RECOMMEND:  (1)Meds as ordered  (2)D/C planning per primary team; f/u at my office as previously planned        Edis Cabral MD  [facility]  Office: [phone]  Cell: [phone]

## 2022-01-03 NOTE — PROGRESS NOTE ADULT - SUBJECTIVE AND OBJECTIVE BOX
Vascular Cardiology  Progress note  DIRECT SERVICE NUMBER: [phone]           OFFICE [phone]    CC:  dizziness & N/V    INTERVAL HISTORY:  No C/P or SOB.  No LE edema.  Worked with PT yesterday, reports sitting at the edge of the bed.  PO intake improved and without any issues.   Reports chronic numbness and paresthesias to bilateral feet.     Allergies  No Known Allergies    MEDICATIONS  (STANDING):  amLODIPine   Tablet 5 milliGRAM(s) Oral daily  aspirin enteric coated 81 milliGRAM(s) Oral daily  atorvastatin 40 milliGRAM(s) Oral at bedtime  calcitriol   Capsule 0.25 MICROGram(s) Oral <User Schedule>  carvedilol 25 milliGRAM(s) Oral every 12 hours  cilostazol 100 milliGRAM(s) Oral two times a day  clopidogrel Tablet 75 milliGRAM(s) Oral daily  heparin   Injectable 5000 Unit(s) SubCutaneous every 8 hours  influenza  Vaccine (HIGH DOSE) 0.7 milliLiter(s) IntraMuscular once  meclizine 25 milliGRAM(s) Oral every 12 hours  polyethylene glycol 3350 17 Gram(s) Oral daily  senna 2 Tablet(s) Oral at bedtime  sodium chloride 0.65% Nasal 1 Spray(s) Both Nostrils every 12 hours    PAST MEDICAL & SURGICAL HISTORY:  HTN - Hypertension  Hypercholesterolemia  PC (prostate cancer)  s/p surgical resection 3 years ago  Gout  H/O prostatectomy  H/O carotid endarterectomy  H/O renal artery stenosis  s/p stent in Left RA  Status post cataract extraction    FAMILY HISTORY: see HPI    SOCIAL HISTORY:  unchanged    REVIEW OF SYSTEMS:  CONSTITUTIONAL: No fever  EYES: No eye pain  ENT:  No throat pain. Vertigo present.   NECK: No pain  RESPIRATORY:  No SOB  CARDIOVASCULAR: No C/P  GASTROINTESTINAL: No abdominal pain. No melena or hematochezia.  GENITOURINARY: No hematuria  NEUROLOGICAL: No memory loss  SKIN: no rash   LYMPH Nodes: No enlarged glands noted  ENDOCRINE: No heat or cold intolerance noted  MUSCULOSKELETAL: No KE edema   PSYCHIATRIC: No depression, anxiety  HEME/LYMPH: No bleeding gums  ALLERGY AND IMMUNOLOGIC: No hives    [ x] All others negative	    PHYSICAL EXAM:  Vital Signs Last 24 Hrs  T(C): 37.1 (03 Jan 2022 04:22), Max: 37.1 (02 Jan 2022 12:43)  T(F): 98.7 (03 Jan 2022 04:22), Max: 98.7 (02 Jan 2022 12:43)  HR: 91 (03 Jan 2022 04:22) (91 - 98)  BP: 147/74 (03 Jan 2022 04:22) (107/70 - 147/74)  RR: 18 (03 Jan 2022 04:22) (18 - 18)  SpO2: 95% (03 Jan 2022 04:22) (92% - 95%)    Appearance: NAD	  HEENT: NC/AT  Cardiovascular:  RRR, S1 and S2  Respiratory: CTA B/L  Psychiatry:  AAO x3   Gastrointestinal:  Soft, Non-tender, + BS	  Skin: No rashes, No ecchymoses, No cyanosis	  Neurologic:  no focal deficit  Extremities:  no LE Edema, bilateral calves soft  Vascular: Audible DP and PT bilaterally       LABS:	 	                        8.6    6.10  )-----------( 129      ( 03 Jan 2022 07:11 )             25.1     01-03    136  |  101  |  33<H>  ----------------------------<  151<H>  4.1   |  21<L>  |  2.35<H>    Ca    8.4      03 Jan 2022 07:06  Phos  3.6     01-03  Mg     2.3     01-03    TPro  6.0  /  Alb  3.5  /  TBili  0.8  /  DBili  x   /  AST  5<L>  /  ALT  6<L>  /  AlkPhos  62  01-03 Vascular Cardiology  Progress note  DIRECT SERVICE NUMBER: [phone]           OFFICE [phone]    CC:  dizziness & N/V    INTERVAL HISTORY:  No C/P or SOB.  No LE edema.  Worked with PT yesterday, reports sitting at the edge of the bed.  PO intake improved and without any issues.   Reports chronic numbness and paresthesias to bilateral feet.   Still reports dizziness / vertigo when OOB, on meclizine.      Allergies  No Known Allergies    MEDICATIONS  (STANDING):  amLODIPine   Tablet 5 milliGRAM(s) Oral daily  aspirin enteric coated 81 milliGRAM(s) Oral daily  atorvastatin 40 milliGRAM(s) Oral at bedtime  calcitriol   Capsule 0.25 MICROGram(s) Oral <User Schedule>  carvedilol 25 milliGRAM(s) Oral every 12 hours  cilostazol 100 milliGRAM(s) Oral two times a day  clopidogrel Tablet 75 milliGRAM(s) Oral daily  heparin   Injectable 5000 Unit(s) SubCutaneous every 8 hours  influenza  Vaccine (HIGH DOSE) 0.7 milliLiter(s) IntraMuscular once  meclizine 25 milliGRAM(s) Oral every 12 hours  polyethylene glycol 3350 17 Gram(s) Oral daily  senna 2 Tablet(s) Oral at bedtime  sodium chloride 0.65% Nasal 1 Spray(s) Both Nostrils every 12 hours    PAST MEDICAL & SURGICAL HISTORY:  HTN - Hypertension  Hypercholesterolemia  PC (prostate cancer)  s/p surgical resection 3 years ago  Gout  H/O prostatectomy  H/O carotid endarterectomy  H/O renal artery stenosis  s/p stent in Left RA  Status post cataract extraction    FAMILY HISTORY: see HPI    SOCIAL HISTORY:  unchanged    REVIEW OF SYSTEMS:  CONSTITUTIONAL: No fever  EYES: No eye pain  ENT:  No throat pain. Vertigo present.   NECK: No pain  RESPIRATORY:  No SOB  CARDIOVASCULAR: No C/P  GASTROINTESTINAL: No abdominal pain. No melena or hematochezia.  GENITOURINARY: No hematuria  NEUROLOGICAL: No memory loss  SKIN: no rash   LYMPH Nodes: No enlarged glands noted  ENDOCRINE: No heat or cold intolerance noted  MUSCULOSKELETAL: No KE edema   PSYCHIATRIC: No depression, anxiety  HEME/LYMPH: No bleeding gums  ALLERGY AND IMMUNOLOGIC: No hives    [ x] All others negative	    PHYSICAL EXAM:  Vital Signs Last 24 Hrs  T(C): 37.1 (03 Jan 2022 04:22), Max: 37.1 (02 Jan 2022 12:43)  T(F): 98.7 (03 Jan 2022 04:22), Max: 98.7 (02 Jan 2022 12:43)  HR: 91 (03 Jan 2022 04:22) (91 - 98)  BP: 147/74 (03 Jan 2022 04:22) (107/70 - 147/74)  RR: 18 (03 Jan 2022 04:22) (18 - 18)  SpO2: 95% (03 Jan 2022 04:22) (92% - 95%)    Appearance: NAD	  HEENT: NC/AT  Cardiovascular:  RRR, S1 and S2  Respiratory: CTA B/L  Psychiatry:  AAO x3   Gastrointestinal:  Soft, Non-tender, + BS	  Skin: No rashes, No ecchymoses, No cyanosis	  Neurologic:  no focal deficit  Extremities:  no LE Edema, bilateral calves soft  Vascular: Audible DP and PT bilaterally       LABS:	 	                        8.6    6.10  )-----------( 129      ( 03 Jan 2022 07:11 )             25.1     01-03    136  |  101  |  33<H>  ----------------------------<  151<H>  4.1   |  21<L>  |  2.35<H>    Ca    8.4      03 Jan 2022 07:06  Phos  3.6     01-03  Mg     2.3     01-03    TPro  6.0  /  Alb  3.5  /  TBili  0.8  /  DBili  x   /  AST  5<L>  /  ALT  6<L>  /  AlkPhos  62  01-03

## 2022-01-04 PROCEDURE — 99232 SBSQ HOSP IP/OBS MODERATE 35: CPT

## 2022-01-04 RX ORDER — LIDOCAINE 4 G/100G
1 CREAM TOPICAL DAILY
Refills: 0 | Status: DISCONTINUED | OUTPATIENT
Start: 2022-01-04 | End: 2022-01-05

## 2022-01-04 RX ORDER — LANOLIN ALCOHOL/MO/W.PET/CERES
5 CREAM (GRAM) TOPICAL ONCE
Refills: 0 | Status: COMPLETED | OUTPATIENT
Start: 2022-01-04 | End: 2022-01-04

## 2022-01-04 RX ADMIN — POLYETHYLENE GLYCOL 3350 17 GRAM(S): 17 POWDER, FOR SOLUTION ORAL at 12:36

## 2022-01-04 RX ADMIN — CILOSTAZOL 100 MILLIGRAM(S): 100 TABLET ORAL at 17:29

## 2022-01-04 RX ADMIN — Medication 5 MILLIGRAM(S): at 22:38

## 2022-01-04 RX ADMIN — Medication 1 SPRAY(S): at 17:30

## 2022-01-04 RX ADMIN — Medication 25 MILLIGRAM(S): at 21:38

## 2022-01-04 RX ADMIN — Medication 1 SPRAY(S): at 05:34

## 2022-01-04 RX ADMIN — Medication 81 MILLIGRAM(S): at 12:37

## 2022-01-04 RX ADMIN — HEPARIN SODIUM 5000 UNIT(S): 5000 INJECTION INTRAVENOUS; SUBCUTANEOUS at 13:59

## 2022-01-04 RX ADMIN — Medication 25 MILLIGRAM(S): at 09:15

## 2022-01-04 RX ADMIN — AMLODIPINE BESYLATE 5 MILLIGRAM(S): 2.5 TABLET ORAL at 05:34

## 2022-01-04 RX ADMIN — HEPARIN SODIUM 5000 UNIT(S): 5000 INJECTION INTRAVENOUS; SUBCUTANEOUS at 21:37

## 2022-01-04 RX ADMIN — HEPARIN SODIUM 5000 UNIT(S): 5000 INJECTION INTRAVENOUS; SUBCUTANEOUS at 05:34

## 2022-01-04 RX ADMIN — CARVEDILOL PHOSPHATE 25 MILLIGRAM(S): 80 CAPSULE, EXTENDED RELEASE ORAL at 17:29

## 2022-01-04 RX ADMIN — CLOPIDOGREL BISULFATE 75 MILLIGRAM(S): 75 TABLET, FILM COATED ORAL at 12:36

## 2022-01-04 RX ADMIN — CARVEDILOL PHOSPHATE 25 MILLIGRAM(S): 80 CAPSULE, EXTENDED RELEASE ORAL at 05:33

## 2022-01-04 RX ADMIN — LIDOCAINE 1 PATCH: 4 CREAM TOPICAL at 17:28

## 2022-01-04 RX ADMIN — ATORVASTATIN CALCIUM 40 MILLIGRAM(S): 80 TABLET, FILM COATED ORAL at 21:37

## 2022-01-04 RX ADMIN — CILOSTAZOL 100 MILLIGRAM(S): 100 TABLET ORAL at 05:34

## 2022-01-04 RX ADMIN — Medication 650 MILLIGRAM(S): at 06:42

## 2022-01-04 NOTE — PROGRESS NOTE ADULT - SUBJECTIVE AND OBJECTIVE BOX
Vascular Cardiology  Progress note  DIRECT SERVICE NUMBER: [phone]           OFFICE [phone]    CC:  dizziness & N/V    INTERVAL HISTORY:  No C/P or SOB.  No LE edema.  PO intake improved.  Still reports dizziness / vertigo when OOB, on meclizine.      Allergies  No Known Allergies    MEDICATIONS  (STANDING):  amLODIPine   Tablet 5 milliGRAM(s) Oral daily  aspirin enteric coated 81 milliGRAM(s) Oral daily  atorvastatin 40 milliGRAM(s) Oral at bedtime  calcitriol   Capsule 0.25 MICROGram(s) Oral <User Schedule>  carvedilol 25 milliGRAM(s) Oral every 12 hours  cilostazol 100 milliGRAM(s) Oral two times a day  clopidogrel Tablet 75 milliGRAM(s) Oral daily  heparin   Injectable 5000 Unit(s) SubCutaneous every 8 hours  influenza  Vaccine (HIGH DOSE) 0.7 milliLiter(s) IntraMuscular once  lidocaine   4% Patch 1 Patch Transdermal daily  meclizine 25 milliGRAM(s) Oral every 12 hours  polyethylene glycol 3350 17 Gram(s) Oral daily  senna 2 Tablet(s) Oral at bedtime  sodium chloride 0.65% Nasal 1 Spray(s) Both Nostrils every 12 hours    PAST MEDICAL & SURGICAL HISTORY:  HTN - Hypertension  Hypercholesterolemia  PC (prostate cancer)  s/p surgical resection 3 years ago  Gout  H/O prostatectomy  H/O carotid endarterectomy  H/O renal artery stenosis  s/p stent in Left RA  Status post cataract extraction    FAMILY HISTORY: see HPI    SOCIAL HISTORY:  unchanged    REVIEW OF SYSTEMS:  CONSTITUTIONAL: No fever  EYES: No eye pain  ENT:  No throat pain. Vertigo present.   NECK: No pain  RESPIRATORY:  No SOB  CARDIOVASCULAR: No C/P  GASTROINTESTINAL: No abdominal pain. No melena or hematochezia.  GENITOURINARY: No hematuria  NEUROLOGICAL: No memory loss  SKIN: no rash   LYMPH Nodes: No enlarged glands noted  ENDOCRINE: No heat or cold intolerance noted  MUSCULOSKELETAL: No KE edema   PSYCHIATRIC: No depression, anxiety  HEME/LYMPH: No bleeding gums  ALLERGY AND IMMUNOLOGIC: No hives    [ x] All others negative	    PHYSICAL EXAM:  Vital Signs Last 24 Hrs  T(C): 36.8 (04 Jan 2022 05:13), Max: 37.6 (03 Jan 2022 21:37)  T(F): 98.3 (04 Jan 2022 05:13), Max: 99.6 (03 Jan 2022 21:37)  HR: 91 (04 Jan 2022 05:13) (82 - 91)  BP: 113/64 (04 Jan 2022 05:13) (113/64 - 122/67)  RR: 16 (04 Jan 2022 05:13) (16 - 16)  SpO2: 95% (04 Jan 2022 05:13) (95% - 95%)    Appearance: NAD	  HEENT: NC/AT  Cardiovascular:  RRR, S1 and S2  Respiratory: CTA B/L  Psychiatry:  AAO x3   Gastrointestinal:  Soft, Non-tender, + BS	  Skin: No rashes, No ecchymoses, No cyanosis	  Neurologic:  no focal deficit  Extremities:  no LE Edema, bilateral calves soft  Vascular: Audible DP and PT bilaterally       LABS:	 	                        8.6    6.10  )-----------( 129      ( 03 Jan 2022 07:11 )             25.1     01-03    136  |  101  |  33<H>  ----------------------------<  151<H>  4.1   |  21<L>  |  2.35<H>    Ca    8.4      03 Jan 2022 07:06  Phos  3.6     01-03  Mg     2.3     01-03    TPro  6.0  /  Alb  3.5  /  TBili  0.8  /  DBili  x   /  AST  5<L>  /  ALT  6<L>  /  AlkPhos  62  01-03

## 2022-01-04 NOTE — PROGRESS NOTE ADULT - SUBJECTIVE AND OBJECTIVE BOX
Overnight events noted      VITAL:  T(C): , Max: 37.6 (01-03-22 @ 21:37)  T(F): , Max: 99.6 (01-03-22 @ 21:37)  HR: 91 (01-04-22 @ 05:13)  BP: 113/64 (01-04-22 @ 05:13)  BP(mean): --  RR: 16 (01-04-22 @ 05:13)  SpO2: 95% (01-04-22 @ 05:13)      PHYSICAL EXAM:  Constitutional: NAD, Alert  HEENT: NCAT, DMM  Neck: Supple, No JVD  Respiratory: CTA-b/l  Cardiovascular: RRR s1s2, no m/r/g  Gastrointestinal: BS+, soft, NT/ND  Extremities: No peripheral edema b/l  Neurological: no focal deficits; strength grossly intact  Back: no CVAT b/l  Skin: No rashes, no nevi      LABS:                        8.6    6.10  )-----------( 129      ( 03 Jan 2022 07:11 )             25.1     Na(136)/K(4.1)/Cl(101)/HCO3(21)/BUN(33)/Cr(2.35)Glu(151)/Ca(8.4)/Mg(2.3)/PO4(3.6)    01-03 @ 07:06  Na(135)/K(4.0)/Cl(100)/HCO3(20)/BUN(31)/Cr(2.30)Glu(136)/Ca(8.4)/Mg(2.0)/PO4(3.6)    01-02 @ 07:11    Creatinine, Random Urine: 137 mg/dL (01-02 @ 10:00)  Sodium, Random Urine: 16 mmol/L (01-02 @ 10:00)      IMPRESSION: 81M w/ HTN, USHA, and CKD 3-4, 12/26/21 p/w night sweats, vomiting, and vertigo    (1)Renal - CKD3-4 - due to renal artery stenosis. Fluctuating numbers based on hemodynamic status    (2)Neuro - vertigo - improved with Meclizine    (3)HTN - BP acceptable      RECOMMEND:  (1)Meds as ordered  (2)D/C planning per primary team; f/u at my office as previously planned          Edis Cabral MD  [facility]  Office: [phone]  Cell: [phone]       (+)intermittent dizziness/vertigo; somewhat improved from days ago      VITAL:  T(C): , Max: 37.6 (01-03-22 @ 21:37)  T(F): , Max: 99.6 (01-03-22 @ 21:37)  HR: 91 (01-04-22 @ 05:13)  BP: 113/64 (01-04-22 @ 05:13)  BP(mean): --  RR: 16 (01-04-22 @ 05:13)  SpO2: 95% (01-04-22 @ 05:13)      PHYSICAL EXAM:  Constitutional: NAD, Alert  HEENT: NCAT, DMM  Neck: Supple, No JVD  Respiratory: CTA-b/l  Cardiovascular: RRR s1s2, no m/r/g  Gastrointestinal: BS+, soft, NT/ND  Extremities: No peripheral edema b/l  Neurological: no focal deficits; strength grossly intact  Back: no CVAT b/l  Skin: No rashes, no nevi      LABS:                        8.6    6.10  )-----------( 129      ( 03 Jan 2022 07:11 )             25.1     Na(136)/K(4.1)/Cl(101)/HCO3(21)/BUN(33)/Cr(2.35)Glu(151)/Ca(8.4)/Mg(2.3)/PO4(3.6)    01-03 @ 07:06  Na(135)/K(4.0)/Cl(100)/HCO3(20)/BUN(31)/Cr(2.30)Glu(136)/Ca(8.4)/Mg(2.0)/PO4(3.6)    01-02 @ 07:11    Creatinine, Random Urine: 137 mg/dL (01-02 @ 10:00)  Sodium, Random Urine: 16 mmol/L (01-02 @ 10:00)      IMPRESSION: 81M w/ HTN, USHA, and CKD 3-4, 12/26/21 p/w night sweats, vomiting, and vertigo    (1)Renal - CKD3-4 - due to renal artery stenosis. Fluctuating numbers based on hemodynamic status    (2)Neuro - vertigo - improved with Meclizine    (3)HTN - BP acceptable      RECOMMEND:  (1)Meds as ordered  (2)D/C planning per primary team; f/u at my office as previously planned          Edis Cabral MD  [facility]  Office: [phone]  Cell: [phone]

## 2022-01-04 NOTE — PROGRESS NOTE ADULT - ASSESSMENT
Assessment:  1. Renal artery stenosis s/p left PTA  2. PAD  3. HTN  4. HLD  5. History of Prostate CA  6. CKD stage 3-4  7. Vertigo    Plan:  1. TTE reviewed showing normal wall motion, preserved LVEF, mild AS.  2. Remains hemodynamically stable.   3. No issues with his prior renal artery intervention.  4. Continue ASA/Plavix, and Pletal. Continue Statin.  5. BP stable on Norvasc and Coreg.   6. Continue Heparin Sub. Cut. for DVT PPX.  7. Continue PT.  8. Dizziness / vertigo management as per neurology and primary team.     Thank you  ASHLYN Garduno, MPAS  Vascular Cardiology Service  Please call with any questions:   [phone]

## 2022-01-04 NOTE — PROGRESS NOTE ADULT - NSPROGADDITIONALINFOA_GEN_ALL_CORE
Discussed with patient, daughter and [facility] ACP. Discussed with patient and [facility] ACP.

## 2022-01-04 NOTE — PROGRESS NOTE ADULT - SUBJECTIVE AND OBJECTIVE BOX
[facility] Division of Hospital Medicine  Neno DestinyclovisthomasDO  Pager ([place], 0E-2H): [phone]  Other Times:  [phone]    Patient is a 81y old  Male who presents with a chief complaint of Dizziness, nausea/vomiting (04 Jan 2022 09:02)    SUBJECTIVE / OVERNIGHT EVENTS: No acute events overnight. Patient seen and examined at bedside this morning, denies acute complaints such as chest pain, shortness of breath, nausea, vomiting or diarrhea.    REVIEW OF SYSTEMS:    CONSTITUTIONAL: No weakness, fevers or chills  EYES/ENT: No visual changes;  No vertigo or throat pain   NECK: No pain or stiffness  RESPIRATORY: No cough, wheezing, hemoptysis; No shortness of breath  CARDIOVASCULAR: No chest pain or palpitations  GASTROINTESTINAL: No abdominal or epigastric pain. No nausea, vomiting, or hematemesis; No diarrhea or constipation. No melena or hematochezia.  GENITOURINARY: No dysuria, frequency or hematuria  NEUROLOGICAL: No numbness or weakness  SKIN: No itching, burning, rashes, or lesions  MSK: Endorses chronic back pain and bilateral lower extremity pain  HEME: No easy bleeding, no easy bruising  All other review of systems is negative unless indicated above.    MEDICATIONS  (STANDING):  amLODIPine   Tablet 5 milliGRAM(s) Oral daily  aspirin enteric coated 81 milliGRAM(s) Oral daily  atorvastatin 40 milliGRAM(s) Oral at bedtime  calcitriol   Capsule 0.25 MICROGram(s) Oral <User Schedule>  carvedilol 25 milliGRAM(s) Oral every 12 hours  cilostazol 100 milliGRAM(s) Oral two times a day  clopidogrel Tablet 75 milliGRAM(s) Oral daily  heparin   Injectable 5000 Unit(s) SubCutaneous every 8 hours  influenza  Vaccine (HIGH DOSE) 0.7 milliLiter(s) IntraMuscular once  lidocaine   4% Patch 1 Patch Transdermal daily  meclizine 25 milliGRAM(s) Oral every 12 hours  polyethylene glycol 3350 17 Gram(s) Oral daily  senna 2 Tablet(s) Oral at bedtime  sodium chloride 0.65% Nasal 1 Spray(s) Both Nostrils every 12 hours    MEDICATIONS  (PRN):  acetaminophen     Tablet .. 650 milliGRAM(s) Oral every 6 hours PRN Temp greater or equal to 38C (100.4F), Mild Pain (1 - 3)  aluminum hydroxide/magnesium hydroxide/simethicone Suspension 30 milliLiter(s) Oral every 6 hours PRN Dyspepsia  ondansetron Injectable 4 milliGRAM(s) IV Push every 8 hours PRN Nausea and/or Vomiting      CAPILLARY BLOOD GLUCOSE        I&O's Summary    03 Jan 2022 07:01  -  04 Jan 2022 07:00  --------------------------------------------------------  IN: 720 mL / OUT: 400 mL / NET: 320 mL    04 Jan 2022 07:01  -  04 Jan 2022 13:06  --------------------------------------------------------  IN: 0 mL / OUT: 300 mL / NET: -300 mL        PHYSICAL EXAM:  Vital Signs Last 24 Hrs  T(C): 36.8 (04 Jan 2022 05:13), Max: 37.6 (03 Jan 2022 21:37)  T(F): 98.3 (04 Jan 2022 05:13), Max: 99.6 (03 Jan 2022 21:37)  HR: 91 (04 Jan 2022 05:13) (82 - 91)  BP: 113/64 (04 Jan 2022 05:13) (113/64 - 122/67)  BP(mean): --  RR: 16 (04 Jan 2022 05:13) (16 - 16)  SpO2: 95% (04 Jan 2022 05:13) (95% - 95%)    CONSTITUTIONAL: Elderly male laying in bed in NAD, well-developed, well-groomed  EYES: EOMI; conjunctiva and sclera clear  ENMT: Moist oral mucosa, no pharyngeal injection or exudates  RESPIRATORY: Normal respiratory effort; lungs are clear to auscultation bilaterally  CARDIOVASCULAR: Regular rate and rhythm, normal S1 and S2, systolic murmur noted; No lower extremity edema  ABDOMEN: Nontender to palpation, normoactive bowel sounds, no rebound/guarding  MUSCULOSKELETAL: No clubbing or cyanosis of digits; no joint swelling or tenderness to palpation  PSYCH: A+O to person, place, and time; affect appropriate  NEUROLOGY: CN 2-12 are intact and symmetric; no gross sensory deficits   SKIN: No rashes; no palpable lesions    LABS:                        8.6    6.10  )-----------( 129      ( 03 Jan 2022 07:11 )             25.1     01-03    136  |  101  |  33<H>  ----------------------------<  151<H>  4.1   |  21<L>  |  2.35<H>    Ca    8.4      03 Jan 2022 07:06  Phos  3.6     01-03  Mg     2.3     01-03    TPro  6.0  /  Alb  3.5  /  TBili  0.8  /  DBili  x   /  AST  5<L>  /  ALT  6<L>  /  AlkPhos  62  01-03

## 2022-01-05 ENCOUNTER — TRANSCRIPTION ENCOUNTER (OUTPATIENT)
Age: 82
End: 2022-01-05

## 2022-01-05 VITALS
DIASTOLIC BLOOD PRESSURE: 61 MMHG | TEMPERATURE: 99 F | HEART RATE: 83 BPM | SYSTOLIC BLOOD PRESSURE: 109 MMHG | RESPIRATION RATE: 18 BRPM | OXYGEN SATURATION: 95 %

## 2022-01-05 PROCEDURE — 93005 ELECTROCARDIOGRAM TRACING: CPT

## 2022-01-05 PROCEDURE — 82947 ASSAY GLUCOSE BLOOD QUANT: CPT

## 2022-01-05 PROCEDURE — 81001 URINALYSIS AUTO W/SCOPE: CPT

## 2022-01-05 PROCEDURE — 70450 CT HEAD/BRAIN W/O DYE: CPT | Mod: MA

## 2022-01-05 PROCEDURE — 83036 HEMOGLOBIN GLYCOSYLATED A1C: CPT

## 2022-01-05 PROCEDURE — 80053 COMPREHEN METABOLIC PANEL: CPT

## 2022-01-05 PROCEDURE — 85014 HEMATOCRIT: CPT

## 2022-01-05 PROCEDURE — 82330 ASSAY OF CALCIUM: CPT

## 2022-01-05 PROCEDURE — 85027 COMPLETE CBC AUTOMATED: CPT

## 2022-01-05 PROCEDURE — 82803 BLOOD GASES ANY COMBINATION: CPT

## 2022-01-05 PROCEDURE — 87086 URINE CULTURE/COLONY COUNT: CPT

## 2022-01-05 PROCEDURE — 83880 ASSAY OF NATRIURETIC PEPTIDE: CPT

## 2022-01-05 PROCEDURE — 36415 COLL VENOUS BLD VENIPUNCTURE: CPT

## 2022-01-05 PROCEDURE — 76770 US EXAM ABDO BACK WALL COMP: CPT

## 2022-01-05 PROCEDURE — 96374 THER/PROPH/DIAG INJ IV PUSH: CPT

## 2022-01-05 PROCEDURE — 80048 BASIC METABOLIC PNL TOTAL CA: CPT

## 2022-01-05 PROCEDURE — 97110 THERAPEUTIC EXERCISES: CPT

## 2022-01-05 PROCEDURE — 82435 ASSAY OF BLOOD CHLORIDE: CPT

## 2022-01-05 PROCEDURE — U0005: CPT

## 2022-01-05 PROCEDURE — 87077 CULTURE AEROBIC IDENTIFY: CPT

## 2022-01-05 PROCEDURE — 86850 RBC ANTIBODY SCREEN: CPT

## 2022-01-05 PROCEDURE — 83735 ASSAY OF MAGNESIUM: CPT

## 2022-01-05 PROCEDURE — 87040 BLOOD CULTURE FOR BACTERIA: CPT

## 2022-01-05 PROCEDURE — 83605 ASSAY OF LACTIC ACID: CPT

## 2022-01-05 PROCEDURE — 84443 ASSAY THYROID STIM HORMONE: CPT

## 2022-01-05 PROCEDURE — 85025 COMPLETE CBC W/AUTO DIFF WBC: CPT

## 2022-01-05 PROCEDURE — 74176 CT ABD & PELVIS W/O CONTRAST: CPT | Mod: MA

## 2022-01-05 PROCEDURE — 99232 SBSQ HOSP IP/OBS MODERATE 35: CPT

## 2022-01-05 PROCEDURE — 82150 ASSAY OF AMYLASE: CPT

## 2022-01-05 PROCEDURE — 84100 ASSAY OF PHOSPHORUS: CPT

## 2022-01-05 PROCEDURE — 82570 ASSAY OF URINE CREATININE: CPT

## 2022-01-05 PROCEDURE — 84484 ASSAY OF TROPONIN QUANT: CPT

## 2022-01-05 PROCEDURE — 99285 EMERGENCY DEPT VISIT HI MDM: CPT

## 2022-01-05 PROCEDURE — 84132 ASSAY OF SERUM POTASSIUM: CPT

## 2022-01-05 PROCEDURE — 97530 THERAPEUTIC ACTIVITIES: CPT

## 2022-01-05 PROCEDURE — 84295 ASSAY OF SERUM SODIUM: CPT

## 2022-01-05 PROCEDURE — 85018 HEMOGLOBIN: CPT

## 2022-01-05 PROCEDURE — 86900 BLOOD TYPING SEROLOGIC ABO: CPT

## 2022-01-05 PROCEDURE — 97161 PT EVAL LOW COMPLEX 20 MIN: CPT

## 2022-01-05 PROCEDURE — 86901 BLOOD TYPING SEROLOGIC RH(D): CPT

## 2022-01-05 PROCEDURE — 82962 GLUCOSE BLOOD TEST: CPT

## 2022-01-05 PROCEDURE — U0003: CPT

## 2022-01-05 PROCEDURE — 84300 ASSAY OF URINE SODIUM: CPT

## 2022-01-05 PROCEDURE — 71045 X-RAY EXAM CHEST 1 VIEW: CPT

## 2022-01-05 PROCEDURE — 83690 ASSAY OF LIPASE: CPT

## 2022-01-05 PROCEDURE — 99239 HOSP IP/OBS DSCHRG MGMT >30: CPT

## 2022-01-05 PROCEDURE — 93306 TTE W/DOPPLER COMPLETE: CPT

## 2022-01-05 RX ORDER — FUROSEMIDE 40 MG
1 TABLET ORAL
Qty: 0 | Refills: 0 | DISCHARGE

## 2022-01-05 RX ORDER — MECLIZINE HCL 12.5 MG
1 TABLET ORAL
Qty: 0 | Refills: 0 | DISCHARGE
Start: 2022-01-05

## 2022-01-05 RX ORDER — JNJ-78436735 50000000000 [PFU]/.5ML
0.5 SUSPENSION INTRAMUSCULAR ONCE
Refills: 0 | Status: DISCONTINUED | OUTPATIENT
Start: 2022-01-05 | End: 2022-01-05

## 2022-01-05 RX ORDER — LIDOCAINE 4 G/100G
1 CREAM TOPICAL
Qty: 0 | Refills: 0 | DISCHARGE
Start: 2022-01-05

## 2022-01-05 RX ORDER — ACETAMINOPHEN 500 MG
2 TABLET ORAL
Qty: 0 | Refills: 0 | DISCHARGE
Start: 2022-01-05

## 2022-01-05 RX ORDER — POLYETHYLENE GLYCOL 3350 17 G/17G
17 POWDER, FOR SOLUTION ORAL
Qty: 0 | Refills: 0 | DISCHARGE
Start: 2022-01-05

## 2022-01-05 RX ORDER — SENNA PLUS 8.6 MG/1
2 TABLET ORAL
Qty: 0 | Refills: 0 | DISCHARGE
Start: 2022-01-05

## 2022-01-05 RX ADMIN — Medication 25 MILLIGRAM(S): at 08:31

## 2022-01-05 RX ADMIN — CALCITRIOL 0.25 MICROGRAM(S): 0.5 CAPSULE ORAL at 06:13

## 2022-01-05 RX ADMIN — AMLODIPINE BESYLATE 5 MILLIGRAM(S): 2.5 TABLET ORAL at 06:12

## 2022-01-05 RX ADMIN — Medication 650 MILLIGRAM(S): at 14:22

## 2022-01-05 RX ADMIN — CILOSTAZOL 100 MILLIGRAM(S): 100 TABLET ORAL at 06:13

## 2022-01-05 RX ADMIN — LIDOCAINE 1 PATCH: 4 CREAM TOPICAL at 06:19

## 2022-01-05 RX ADMIN — LIDOCAINE 1 PATCH: 4 CREAM TOPICAL at 12:21

## 2022-01-05 RX ADMIN — Medication 1 SPRAY(S): at 06:12

## 2022-01-05 RX ADMIN — Medication 81 MILLIGRAM(S): at 12:20

## 2022-01-05 RX ADMIN — CARVEDILOL PHOSPHATE 25 MILLIGRAM(S): 80 CAPSULE, EXTENDED RELEASE ORAL at 06:12

## 2022-01-05 RX ADMIN — Medication 650 MILLIGRAM(S): at 12:20

## 2022-01-05 RX ADMIN — CLOPIDOGREL BISULFATE 75 MILLIGRAM(S): 75 TABLET, FILM COATED ORAL at 12:20

## 2022-01-05 RX ADMIN — HEPARIN SODIUM 5000 UNIT(S): 5000 INJECTION INTRAVENOUS; SUBCUTANEOUS at 06:12

## 2022-01-05 NOTE — DISCHARGE NOTE PROVIDER - NSDCFUADDAPPT_GEN_ALL_CORE_FT
Please follow up with Dr. Cabral and Dr. Reddy within 1-2 weeks of discharge Please follow up with Dr. Cabral and Dr. Reddy within 1-2 weeks of discharge.  Please follow up with vestibular rehab as an outpatient. A list of [facility] rehabs has been provided to you.

## 2022-01-05 NOTE — PROGRESS NOTE ADULT - PROBLEM SELECTOR PLAN 8
Hep sq for dvt ppx   PT eval    Daughter thinks pt may have a MOLST form at home, to check/bring it in
No acute issues, outpt follow up
No acute issues, outpt follow up
Hep sq for dvt ppx   PT eval pending
No acute issues, outpt follow up

## 2022-01-05 NOTE — PROGRESS NOTE ADULT - PROBLEM SELECTOR PLAN 6
Lipitor 40mg interchange for crestor 10mg
Home meds asprin 81mg, plavix 75mg , cilostazol 100mg bid
Home meds asprin 81mg, plavix 75mg , cilostazol 100mg bid
Lipitor 40mg interchange for crestor 10mg
Home meds asprin 81mg, plavix 75mg , cilostazol 100mg bid

## 2022-01-05 NOTE — PROGRESS NOTE ADULT - ASSESSMENT
Assessment:  1. Renal artery stenosis s/p left PTA  2. PAD  3. HTN  4. HLD  5. History of Prostate CA  6. CKD stage 3-4  7. Vertigo    Plan:  1. TTE reviewed showing normal wall motion, preserved LVEF, mild AS.  2. Remains hemodynamically stable.   3. No issues with his prior renal artery intervention.  4. Continue ASA/Plavix, and Pletal. Continue Statin.  5. BP stable on Norvasc and Coreg.   6. Continue Heparin Sub. Cut. for DVT PPX.  7. Continue PT.  8. Dizziness / vertigo management as per neurology and primary team.     Thank you  ASHLYN Garduno, MPAS  Vascular Cardiology Service  Please call with any questions:   [phone]

## 2022-01-05 NOTE — PROGRESS NOTE ADULT - PROBLEM SELECTOR PROBLEM 8
Prostate CA
Prophylactic measure
Prophylactic measure
Prostate CA

## 2022-01-05 NOTE — PROGRESS NOTE ADULT - PROBLEM SELECTOR PLAN 1
Pt with emesis, weakness, nausea, vomiting now improved  ?Gastroenteritis from food - CT negative  Troponins negative, EKG without st changes    LFTs wnl, lipase normal   No diarrhea to send stool studies, ua, cxr negative for infectious etiology   Clears diet, advance as tolerated per pt wants to wait for now  IVF LR @ 75cc/hr     blood cultures negative
Gram positive rods  -Awaiting speciation but suspect possible Listeria or clostridium or Bacillus but could also be corynebacterium skin contaminant  -Started Ampicillin until ID consult  -TTE no vegetations 12/28
Gram positive rods- Corynebacterium so contaminated   -DC abx   -TTE no vegetations 12/28  -Repeat cultures are negative
Pt with emesis, weakness, nausea, vomiting now improved  ?Gastroenteritis from food - CT negative  Troponins negative, EKG without st changes    LFTs wnl, lipase normal   No diarrhea to send stool studies, ua, cxr negative for infectious etiology   Clears diet, advance as tolerated per pt wants to wait for now  IVF LR @ 75cc/hr   F/U blood cultures
Gram positive rods- Corynebacterium so contaminated   -DC abx   -TTE no vegetations 12/28  -Repeat cultures are negative

## 2022-01-05 NOTE — PROGRESS NOTE ADULT - PROBLEM SELECTOR PROBLEM 3
Vertigo
Stage 3 chronic kidney disease
Stage 3 chronic kidney disease
Vertigo

## 2022-01-05 NOTE — PROGRESS NOTE ADULT - PROBLEM SELECTOR PLAN 4
Renal artery stenosis s/p left ptra   Renal US shows chronic disease and renal cyst  Avoid nephrotoxins  Trend cr  C/w calcitrol 0.25 mwf
BP elevated on admission, pt did not take his meds this morning   Start home med norvasc 5mg, coreg 25mg bid
Renal artery stenosis s/p left ptra   Renal US shows chronic disease and renal cyst  Avoid nephrotoxins  Trend cr  C/w calcitrol 0.25 mwf
Renal artery stenosis s/p left ptra   Renal US shows chronic disease and renal cyst  Avoid nephrotoxins  Trend cr  C/w calcitrol 0.25 mwf
BP elevated on admission, pt did not take his meds this morning   resumed norvasc 5mg, coreg 25mg bid
Renal artery stenosis s/p left ptra   Renal US shows chronic disease   Avoid nephrotoxins  Trend cr  C/w calcitrol 0.25 mwf
Renal artery stenosis s/p left ptra   Renal US shows chronic disease and renal cyst  Avoid nephrotoxins  Trend cr  C/w calcitrol 0.25 mwf
Renal artery stenosis s/p left ptra   Renal US shows chronic disease and renal cyst  Avoid nephrotoxins  Trend cr  C/w calcitrol 0.25 mwf

## 2022-01-05 NOTE — PROGRESS NOTE ADULT - PROBLEM SELECTOR PLAN 3
Troponins negative, EKG without st changes, as above r/o atypical CP   Started Meclizine BID 12/29, dizziness improved  Denies chest pain, no syncope   orthostatics negative  Neuro eval, agree with meclizine and vestibular rehab
Renal artery stenosis s/p left ptra   Renal US pending  Avoid nephrotoxins  Trend cr  C/w calcitrol 0.25 mwf
Now he is saying it is more nausea than dizziness but not able to check orthostatics 2/2 nausea  Troponins negative, EKG without st changes, as above r/o atypical CP   Started Meclizine BID 12/29  Denies chest pain, no syncope   orthostatics negative  IVF   Neuro eval as outpt
Renal artery stenosis s/p left ptra  Cr at baseline  Avoid nephrotoxins  Trend cr  C/w calcitrol 0.25 mwf
Troponins negative, EKG without st changes, as above r/o atypical CP   Started Meclizine BID 12/29, dizziness improved  Denies chest pain, no syncope   orthostatics negative  Neuro eval, agree with meclizine and vestibular rehab
Troponins negative, EKG without st changes, as above r/o atypical CP   Started Meclizine BID 12/29, dizziness improved  Denies chest pain, no syncope   orthostatics negative  Neuro eval, agree with meclizine and vestibular rehab
Troponins negative, EKG without st changes, as above r/o atypical CP   Started Meclizine BID 12/29 w/o improvement  Denies chest pain, no syncope   orthostatics negative  IVF   Neuro eval here and MRI head to r/o cerebellar lesions/stroke
Troponins negative, EKG without st changes, as above r/o atypical CP   Started Meclizine BID 12/29, dizziness improved  Denies chest pain, no syncope   orthostatics negative  Neuro eval, agree with meclizine and vestibular rehab

## 2022-01-05 NOTE — PROGRESS NOTE ADULT - PROBLEM SELECTOR PROBLEM 1
Gram-positive bacteremia
Gram-positive bacteremia
Emesis
Gram-positive bacteremia
Gram-positive bacteremia
Emesis
Gram-positive bacteremia

## 2022-01-05 NOTE — PROGRESS NOTE ADULT - NSPROGADDITIONALINFOA_GEN_ALL_CORE
Patient medically ready for discharge to [facility] with outpatient nephrology and cardiology follow up as well as vestibular rehab. Total time spent discharge planning 43 minutes. Discussed with patient and [facility] ACP.

## 2022-01-05 NOTE — PROGRESS NOTE ADULT - REASON FOR ADMISSION
Dizziness, nausea/vomiting
dizziness & N/V
Dizziness, nausea/vomiting

## 2022-01-05 NOTE — DISCHARGE NOTE PROVIDER - HOSPITAL COURSE
81M with hx prostate CA, HLD, HTN, PAD, renal artery stenosis s/p left ptra, CKD stage 3-4, hx of vertigo who presents from home with emesis and dizziness  ·Gram-positive bacteremia: · Corynebacterium so contaminated, -DC abx ,-TTE no vegetations 12/28, -Repeat cultures are negative.   Emesis. Pt with emesis, weakness, nausea, vomiting now improved  ?Gastroenteritis from food - CT negative  Troponin negative, EKG without st changes and echo normal so not related to ACS  LFTs wnl, lipase normal   No diarrhea to send stool studies, ua, cxr negative for infectious etiology.  #Vertigo.   Troponins negative, EKG without st changes, as above r/o atypical CP   Started Meclizine BID 12/29, dizziness improved  Denies chest pain, no syncope   orthostatics negative  Neuro eval, agree with meclizine and vestibular rehab.  #Stage 3 chronic kidney disease.   ·  Plan: Renal artery stenosis s/p left ptra   Renal US shows chronic disease and renal cyst  Avoid nephrotoxins  Trend cr  C/w calcitrol 0.25 mwf.  #Hypertension.   BP elevated on admission,   resumed norvasc 5mg, coreg 25mg bid.  #PAD (peripheral artery disease).   ·Home meds asprin 81mg, plavix 75mg , cilostazol 100mg bid.  # Hyperlipidemia.   Lipitor 40mg interchange for crestor 10mg.  #Prostate CA.   ·  Plan: No acute issues, outpt follow up.

## 2022-01-05 NOTE — PROGRESS NOTE ADULT - ASSESSMENT
IMPRESSION: 81M w/ HTN, USHA, and CKD 3-4, 12/26/21 p/w night sweats, vomiting, and vertigo    (1)Renal - CKD3-4 - due to renal artery stenosis. Fluctuating numbers based on hemodynamic status    (2)Neuro - vertigo - improved with Meclizine    (3)HTN - BP acceptable/ stable     RECOMMEND:  (1)Meds as ordered  (2)D/C planning per primary team; f/u with Dr. Cabral in office as previously planned    Carlie Wilson NP-C  [facility]  [phone]              IMPRESSION: 81M w/ HTN, USHA, and CKD 3-4, 12/26/21 p/w night sweats, vomiting, and vertigo    (1)Renal - CKD3-4 - due to renal artery stenosis. Fluctuating numbers based on hemodynamic status    (2)Neuro - vertigo - improved with Meclizine    (3)HTN - BP acceptable/ stable     RECOMMEND:  (1)Meds as ordered  (2)D/C planning per primary team; f/u with Dr. Cabral in office as previously planned    Carlie Wilson NP-C  [facility]  [phone]       RENAL ATTENDING NOTE  Patient seen and examined with NP. Agree with assessment and plan as above.    Edis Cabral MD  [facility]  [phone]

## 2022-01-05 NOTE — DISCHARGE NOTE PROVIDER - NSDCCPCAREPLAN_GEN_ALL_CORE_FT
Pt's son Thalia Montoya called into the office  He needs written scripts for pt's medications to give to pt's new assisted living facility  I have queued up the script that pt is receiving from our office which pt would like to  today or tomorrow morning at the latest  The scripts are set to print and then will need to be signed by Dr Marlene Fragoso, please assist and call Thalia Montoya once scripts are signed and ready for    [phone] PRINCIPAL DISCHARGE DIAGNOSIS  Diagnosis: Emesis  Assessment and Plan of Treatment: emesis, weakness, nausea, vomiting now improved  ?Gastroenteritis from food - CT negative  Troponin negative, EKG without st changes and echo normal so not related to ACS  LFTs wnl, lipase normal   No diarrhea to send stool studies, ua, cxr negative for infectious etiology.      SECONDARY DISCHARGE DIAGNOSES  Diagnosis: PAD (peripheral artery disease)  Assessment and Plan of Treatment: Home meds asprin 81mg, plavix 75mg , cilostazol 100mg bid. cilostazol 100mg bid.    Diagnosis: Stage 3 chronic kidney disease  Assessment and Plan of Treatment: Stable   CKD3-4 - due to renal artery stenosis.  -Follow-up with renal outpt for further monitoring    Diagnosis: Gram-positive bacteremia  Assessment and Plan of Treatment: sitive bacteremia.·  Plan: Gram positive rods- Corynebacterium so contaminated   -TTE no vegetations 12/28  -Repeat cultures are negative.      Diagnosis: Hypertension  Assessment and Plan of Treatment: Low salt diet  Activity as tolerated.  Take all medication as prescribed.  Follow up with your medical doctor for routine blood pressure monitoring at your next visit.  Notify your doctor if you have any of the following symptoms:   Dizziness, Lightheadedness, Blurry vision, Headache, Chest pain, Shortness of breath    Diagnosis: Vertigo  Assessment and Plan of Treatment: Improved   -continue meclizine   -vestibular rehab.        PRINCIPAL DISCHARGE DIAGNOSIS  Diagnosis: Emesis  Assessment and Plan of Treatment: You presented due to emesis, weakness, nausea, vomiting now improved  Possible Gastroenteritis from food   Continue to eat and drink to stay hydrated      SECONDARY DISCHARGE DIAGNOSES  Diagnosis: Vertigo  Assessment and Plan of Treatment: Improved   -continue meclizine   -vestibular rehab.       Diagnosis: Stage 3 chronic kidney disease  Assessment and Plan of Treatment: Stable   CKD3-4 - due to renal artery stenosis.  -Follow-up with renal Dr. Cabral outpatient for further monitoring  -Do not take your lasix for now, discuss with Dr. Cabral when you can restart it    Diagnosis: PAD (peripheral artery disease)  Assessment and Plan of Treatment: Home meds asprin 81mg, plavix 75mg , cilostazol 100mg bid. cilostazol 100mg bid. Follow up with cardiologist Dr. Reddy    Diagnosis: Hypertension  Assessment and Plan of Treatment: Low salt diet  Activity as tolerated.  Take all medication as prescribed.  Follow up with your medical doctor for routine blood pressure monitoring at your next visit.  Notify your doctor if you have any of the following symptoms:   Dizziness, Lightheadedness, Blurry vision, Headache, Chest pain, Shortness of breath     PRINCIPAL DISCHARGE DIAGNOSIS  Diagnosis: Emesis  Assessment and Plan of Treatment: You presented due to emesis, weakness, nausea, vomiting now improved  Possible Gastroenteritis from food   Continue to eat and drink to stay hydrated      SECONDARY DISCHARGE DIAGNOSES  Diagnosis: Vertigo  Assessment and Plan of Treatment: Improved   -continue meclizine   -vestibular rehab. You have been provided a list of outpatient vestibular rehabs through [facility] and a prescription, please follow up upon your discharge from subacute rehab to go to outpatient vestibular rehab      Diagnosis: Stage 3 chronic kidney disease  Assessment and Plan of Treatment: Stable   CKD3-4 - due to renal artery stenosis.  -Follow-up with renal Dr. Cabral outpatient for further monitoring  -Do not take your lasix for now, discuss with Dr. Cabral when you can restart it    Diagnosis: PAD (peripheral artery disease)  Assessment and Plan of Treatment: Home meds asprin 81mg, plavix 75mg , cilostazol 100mg bid. cilostazol 100mg bid. Follow up with cardiologist Dr. Reddy    Diagnosis: Hypertension  Assessment and Plan of Treatment: Low salt diet  Activity as tolerated.  Take all medication as prescribed.  Follow up with your medical doctor for routine blood pressure monitoring at your next visit.  Notify your doctor if you have any of the following symptoms:   Dizziness, Lightheadedness, Blurry vision, Headache, Chest pain, Shortness of breath

## 2022-01-05 NOTE — PROGRESS NOTE ADULT - ATTENDING COMMENTS
Continue current meds  Await echo  primary for dizzness/vertigo    Barry [phone]
Continue current meds  BP well controlled  Outpatient followup      Barry [phone]
No cardiac/vascular cause of his symptoms  appreciate internal medicine care  ID laurenal for blood cultures      Barry [phone]
States he was able to stand with PT  no further cardiac / vascular workup needed  neurology followup for dizziness    Barry [phone]
Vertiginous symptoms  Will ask for echo to assure no cardiac cause  BP and renal function stable  D/W dr. Misha Reddy [phone]
Symptomatic dizziness  Would ask neurology to evaluate    Barry
No further cardiac or vascular testing needed  Appreciate Neuro lencho Reddy [phone]

## 2022-01-05 NOTE — PROGRESS NOTE ADULT - PROBLEM SELECTOR PLAN 5
BP elevated on admission, pt did not take his meds this morning   resumed norvasc 5mg, coreg 25mg bid
Home meds asprin 81mg, plavix 75mg , cilostazol 100mg bid
BP elevated on admission,   resumed norvasc 5mg, coreg 25mg bid
Home meds asprin 81mg, plavix 75mg , cilostazol 100mg bid
BP elevated on admission,   resumed norvasc 5mg, coreg 25mg bid
BP elevated on admission, pt did not take his meds this morning   resumed norvasc 5mg, coreg 25mg bid
BP elevated on admission,   resumed norvasc 5mg, coreg 25mg bid

## 2022-01-05 NOTE — PROGRESS NOTE ADULT - PROVIDER SPECIALTY LIST ADULT
Hospitalist
Infectious Disease
Nephrology
Vascular Cardiology
Vascular Cardiology
Nephrology
Nephrology
Vascular Cardiology
Hospitalist

## 2022-01-05 NOTE — PROGRESS NOTE ADULT - PROBLEM SELECTOR PLAN 2
Pt with emesis, weakness, nausea, vomiting now improved  ?Gastroenteritis from food - CT negative  Troponin negative, EKG without st changes and echo normal so not related to ACS  LFTs wnl, lipase normal   No diarrhea to send stool studies, ua, cxr negative for infectious etiology
Now he is saying it is more nausea than dizziness but not able to check orthostatics 2/2 nausea  Troponins negative, EKG without st changes, tte pending to r/o atypical CP   Denies chest pain, no syncope   Check orthostatics when able  IVF   Neuro eval as outpt
Pt with emesis, weakness, nausea, vomiting now improved  ?Gastroenteritis from food - CT negative  Troponin negative, EKG without st changes and echo normal so not related to ACS  LFTs wnl, lipase normal   No diarrhea to send stool studies, ua, cxr negative for infectious etiology
Pt with prior hx of vertigo, current episode of dizziness consistent with ?bpv  Troponins negative, EKG without st changes   Denies chest pain, no syncope   Check orthostatics   IVF  Dizziness has improved on exam   Neuro eval as outpt
Pt with emesis, weakness, nausea, vomiting now improved  ?Gastroenteritis from food - CT negative  Troponin negative, EKG without st changes and echo normal so not related to ACS  LFTs wnl, lipase normal   No diarrhea to send stool studies, ua, cxr negative for infectious etiology   Clears diet, advance as tolerated per pt wants to wait for now  IVF LR @ 75cc/hr
Pt with emesis, weakness, nausea, vomiting now improved  ?Gastroenteritis from food - CT negative  Troponin negative, EKG without st changes and echo normal so not related to ACS  LFTs wnl, lipase normal   No diarrhea to send stool studies, ua, cxr negative for infectious etiology
Pt with emesis, weakness, nausea, vomiting now improved  ?Gastroenteritis from food - CT negative  Troponin negative, EKG without st changes and echo normal so not related to ACS  LFTs wnl, lipase normal   No diarrhea to send stool studies, ua, cxr negative for infectious etiology   Clears diet, advance as tolerated per pt wants to wait for now  IVF LR @ 75cc/hr

## 2022-01-05 NOTE — PROGRESS NOTE ADULT - SUBJECTIVE AND OBJECTIVE BOX
[facility] Division of Hospital Medicine  Neno DestinyDO jeremiah  Pager ([place], 3U-0K): [phone]  Other Times:  [phone]    Patient is a 81y old  Male who presents with a chief complaint of Dizziness, nausea/vomiting (05 Jan 2022 09:32)    SUBJECTIVE / OVERNIGHT EVENTS: No acute events overnight. Patient seen and examined at bedside this morning, denies chest pain, shortness of breath, nausea, vomiting or diarrhea.    REVIEW OF SYSTEMS:    CONSTITUTIONAL: No weakness, fevers or chills  EYES/ENT: No visual changes;  No vertigo or throat pain   NECK: No pain or stiffness  RESPIRATORY: No cough, wheezing, hemoptysis; No shortness of breath  CARDIOVASCULAR: No chest pain or palpitations  GASTROINTESTINAL: No abdominal or epigastric pain. No nausea, vomiting, or hematemesis; No diarrhea or constipation. No melena or hematochezia.  GENITOURINARY: No dysuria, frequency or hematuria  NEUROLOGICAL: No numbness or weakness  SKIN: No itching, burning, rashes, or lesions  MSK: Endorses chronic back pain and bilateral lower extremity pain  HEME: No easy bleeding, no easy bruising  All other review of systems is negative unless indicated above.    MEDICATIONS  (STANDING):  amLODIPine   Tablet 5 milliGRAM(s) Oral daily  aspirin enteric coated 81 milliGRAM(s) Oral daily  atorvastatin 40 milliGRAM(s) Oral at bedtime  calcitriol   Capsule 0.25 MICROGram(s) Oral <User Schedule>  carvedilol 25 milliGRAM(s) Oral every 12 hours  cilostazol 100 milliGRAM(s) Oral two times a day  clopidogrel Tablet 75 milliGRAM(s) Oral daily  coronavirus (EUA) Booster Vaccine (Edufii) 0.5 milliLiter(s) IntraMuscular once  heparin   Injectable 5000 Unit(s) SubCutaneous every 8 hours  lidocaine   4% Patch 1 Patch Transdermal daily  meclizine 25 milliGRAM(s) Oral every 12 hours  polyethylene glycol 3350 17 Gram(s) Oral daily  senna 2 Tablet(s) Oral at bedtime  sodium chloride 0.65% Nasal 1 Spray(s) Both Nostrils every 12 hours    MEDICATIONS  (PRN):  acetaminophen     Tablet .. 650 milliGRAM(s) Oral every 6 hours PRN Temp greater or equal to 38C (100.4F), Mild Pain (1 - 3)  aluminum hydroxide/magnesium hydroxide/simethicone Suspension 30 milliLiter(s) Oral every 6 hours PRN Dyspepsia  ondansetron Injectable 4 milliGRAM(s) IV Push every 8 hours PRN Nausea and/or Vomiting      CAPILLARY BLOOD GLUCOSE        I&O's Summary    04 Jan 2022 07:01  -  05 Jan 2022 07:00  --------------------------------------------------------  IN: 0 mL / OUT: 625 mL / NET: -625 mL    05 Jan 2022 07:01  -  05 Jan 2022 13:29  --------------------------------------------------------  IN: 360 mL / OUT: 0 mL / NET: 360 mL        PHYSICAL EXAM:  Vital Signs Last 24 Hrs  T(C): 37 (05 Jan 2022 12:10), Max: 37.1 (04 Jan 2022 21:42)  T(F): 98.6 (05 Jan 2022 12:10), Max: 98.8 (04 Jan 2022 21:42)  HR: 83 (05 Jan 2022 12:10) (83 - 96)  BP: 109/61 (05 Jan 2022 12:10) (102/65 - 121/96)  BP(mean): --  RR: 18 (05 Jan 2022 12:10) (18 - 18)  SpO2: 95% (05 Jan 2022 12:10) (94% - 96%)    CONSTITUTIONAL: Elderly male laying in bed in NAD, well-developed, well-groomed  EYES: EOMI; conjunctiva and sclera clear  ENMT: Moist oral mucosa, no pharyngeal injection or exudates  RESPIRATORY: Normal respiratory effort; lungs are clear to auscultation bilaterally  CARDIOVASCULAR: Regular rate and rhythm, normal S1 and S2, systolic murmur noted; No lower extremity edema  ABDOMEN: Nontender to palpation, normoactive bowel sounds, no rebound/guarding  MUSCULOSKELETAL: No clubbing or cyanosis of digits; no joint swelling or tenderness to palpation  PSYCH: A+O to person, place, and time; affect appropriate  NEUROLOGY: CN 2-12 are intact and symmetric; no gross sensory deficits   SKIN: No rashes; no palpable lesions

## 2022-01-05 NOTE — PROGRESS NOTE ADULT - SUBJECTIVE AND OBJECTIVE BOX
NEPHROLOGY     Patient seen and examined. Pt reports feeling better, dizziness improved, no complaints of pain, no sob,  currently in no acute distress.     MEDICATIONS  (STANDING):  amLODIPine   Tablet 5 milliGRAM(s) Oral daily  aspirin enteric coated 81 milliGRAM(s) Oral daily  atorvastatin 40 milliGRAM(s) Oral at bedtime  calcitriol   Capsule 0.25 MICROGram(s) Oral <User Schedule>  carvedilol 25 milliGRAM(s) Oral every 12 hours  cilostazol 100 milliGRAM(s) Oral two times a day  clopidogrel Tablet 75 milliGRAM(s) Oral daily  coronavirus (EUA) Booster Vaccine (Veveo) 0.5 milliLiter(s) IntraMuscular once  heparin   Injectable 5000 Unit(s) SubCutaneous every 8 hours  lidocaine   4% Patch 1 Patch Transdermal daily  meclizine 25 milliGRAM(s) Oral every 12 hours  polyethylene glycol 3350 17 Gram(s) Oral daily  senna 2 Tablet(s) Oral at bedtime  sodium chloride 0.65% Nasal 1 Spray(s) Both Nostrils every 12 hours    VITALS:  T(C): , Max: 37.1 (01-04-22 @ 21:42)  T(F): , Max: 98.8 (01-04-22 @ 21:42)  HR: 83 (01-05-22 @ 12:10)  BP: 109/61 (01-05-22 @ 12:10)  RR: 18 (01-05-22 @ 12:10)  SpO2: 95% (01-05-22 @ 12:10)    I and O's:    01-04 @ 07:01  -  01-05 @ 07:00  --------------------------------------------------------  IN: 0 mL / OUT: 625 mL / NET: -625 mL    01-05 @ 07:01  -  01-05 @ 13:59  --------------------------------------------------------  IN: 360 mL / OUT: 0 mL / NET: 360 mL    PHYSICAL EXAM:  Constitutional: NAD, Alert  HEENT: NCAT, DMM  Neck: Supple, No JVD  Respiratory: CTA-b/l  Cardiovascular: RRR s1s2, no m/r/g  Gastrointestinal: BS+, soft, NT/ND  Extremities: No peripheral edema b/l  Neurological: no focal deficits; strength grossly intact  Back: no CVAT b/l  Skin: No rashes, no nevi      LABS:    No labs today

## 2022-01-05 NOTE — DISCHARGE NOTE PROVIDER - CARE PROVIDER_API CALL
Gabriel Reddy (DO)  Cardiovascular Disease; Internal Medicine; Nuclear Cardiology  [address] [place] [address]  Phone: [phone]  Fax: [phone]  Follow Up Time:     Edis Cabral)  Internal Medicine; Nephrology  [address] [facility] [facility] [address]  [place] [address]  Phone: [phone]  Fax: [phone]  Follow Up Time:

## 2022-01-05 NOTE — PROGRESS NOTE ADULT - SUBJECTIVE AND OBJECTIVE BOX
Vascular Cardiology  Progress note  DIRECT SERVICE NUMBER: [phone]           OFFICE [phone]    CC:  dizziness & N/V    INTERVAL HISTORY:  No C/P or SOB.  No LE edema.  PO intake improved.  Looking better.     Allergies  No Known Allergies    MEDICATIONS  (STANDING):  amLODIPine   Tablet 5 milliGRAM(s) Oral daily  aspirin enteric coated 81 milliGRAM(s) Oral daily  atorvastatin 40 milliGRAM(s) Oral at bedtime  calcitriol   Capsule 0.25 MICROGram(s) Oral <User Schedule>  carvedilol 25 milliGRAM(s) Oral every 12 hours  cilostazol 100 milliGRAM(s) Oral two times a day  clopidogrel Tablet 75 milliGRAM(s) Oral daily  heparin   Injectable 5000 Unit(s) SubCutaneous every 8 hours  influenza  Vaccine (HIGH DOSE) 0.7 milliLiter(s) IntraMuscular once  lidocaine   4% Patch 1 Patch Transdermal daily  meclizine 25 milliGRAM(s) Oral every 12 hours  polyethylene glycol 3350 17 Gram(s) Oral daily  senna 2 Tablet(s) Oral at bedtime  sodium chloride 0.65% Nasal 1 Spray(s) Both Nostrils every 12 hours    PAST MEDICAL & SURGICAL HISTORY:  HTN - Hypertension  Hypercholesterolemia  PC (prostate cancer)  s/p surgical resection 3 years ago  Gout  H/O prostatectomy  H/O carotid endarterectomy  H/O renal artery stenosis  s/p stent in Left RA  Status post cataract extraction    FAMILY HISTORY: see HPI    SOCIAL HISTORY:  unchanged    REVIEW OF SYSTEMS:  CONSTITUTIONAL: No fever  EYES: No eye pain  ENT:  No throat pain. Vertigo present.   NECK: No pain  RESPIRATORY:  No SOB  CARDIOVASCULAR: No C/P  GASTROINTESTINAL: No abdominal pain. No melena or hematochezia.  GENITOURINARY: No hematuria  NEUROLOGICAL: No memory loss  SKIN: no rash   LYMPH Nodes: No enlarged glands noted  ENDOCRINE: No heat or cold intolerance noted  MUSCULOSKELETAL: No KE edema   PSYCHIATRIC: No depression, anxiety  HEME/LYMPH: No bleeding gums  ALLERGY AND IMMUNOLOGIC: No hives    [ x] All others negative	    PHYSICAL EXAM:  Vital Signs Last 24 Hrs  T(C): 37.1 (05 Jan 2022 04:36), Max: 37.1 (04 Jan 2022 21:42)  T(F): 98.8 (05 Jan 2022 04:36), Max: 98.8 (04 Jan 2022 21:42)  HR: 84 (05 Jan 2022 04:36) (84 - 96)  BP: 102/65 (05 Jan 2022 04:36) (102/65 - 121/96)  RR: 18 (05 Jan 2022 04:36) (18 - 18)  SpO2: 96% (05 Jan 2022 04:36) (94% - 96%)    Appearance: NAD	  HEENT: NC/AT  Cardiovascular:  RRR, S1 and S2  Respiratory: CTA B/L  Psychiatry:  AAO x3   Gastrointestinal:  Soft, Non-tender, + BS	  Skin: No rashes, No ecchymoses, No cyanosis	  Neurologic:  no focal deficit  Extremities:  no LE Edema, bilateral calves soft  Vascular: Audible DP and PT bilaterally       LABS: see HPI

## 2022-01-05 NOTE — PROGRESS NOTE ADULT - PROBLEM SELECTOR PROBLEM 6
PAD (peripheral artery disease)
PAD (peripheral artery disease)
Hyperlipidemia
PAD (peripheral artery disease)
Hyperlipidemia
PAD (peripheral artery disease)

## 2022-01-05 NOTE — PROGRESS NOTE ADULT - PROBLEM SELECTOR PROBLEM 4
Stage 3 chronic kidney disease
Hypertension
Stage 3 chronic kidney disease
Hypertension
Stage 3 chronic kidney disease

## 2022-01-05 NOTE — PROGRESS NOTE ADULT - PROBLEM SELECTOR PROBLEM 7
Hyperlipidemia
Prostate CA
Hyperlipidemia
Hyperlipidemia
Prostate CA
Hyperlipidemia

## 2022-01-05 NOTE — PROGRESS NOTE ADULT - PROBLEM SELECTOR PROBLEM 5
Hypertension
PAD (peripheral artery disease)
PAD (peripheral artery disease)
Hypertension

## 2022-01-05 NOTE — DISCHARGE NOTE PROVIDER - NSDCMRMEDTOKEN_GEN_ALL_CORE_FT
amLODIPine 5 mg oral tablet: 1 tab(s) orally once a day  aspirin 81 mg oral tablet, chewable: 1 tab(s) orally once a day  calcitriol 0.25 mcg oral capsule: 1 cap(s) orally 3 times a week - [facility]  carvedilol 25 mg oral tablet: 1 tab(s) orally 2 times a day  cilostazol 100 mg oral tablet: 1 tab(s) orally 2 times a day  clopidogrel 75 mg oral tablet: 1 tab(s) orally once a day  furosemide 40 mg oral tablet: 1 tab(s) orally every other day  note: last dose unknown  rosuvastatin 10 mg oral tablet: 1 tab(s) orally once a day   acetaminophen 325 mg oral tablet: 2 tab(s) orally every 6 hours, As needed, Temp greater or equal to 38C (100.4F), Mild Pain (1 - 3)  aluminum hydroxide-magnesium hydroxide 200 mg-200 mg/5 mL oral suspension: 30 milliliter(s) orally every 6 hours, As needed, Dyspepsia  amLODIPine 5 mg oral tablet: 1 tab(s) orally once a day  aspirin 81 mg oral tablet, chewable: 1 tab(s) orally once a day  calcitriol 0.25 mcg oral capsule: 1 cap(s) orally 3 times a week - MWF  carvedilol 25 mg oral tablet: 1 tab(s) orally 2 times a day  cilostazol 100 mg oral tablet: 1 tab(s) orally 2 times a day  clopidogrel 75 mg oral tablet: 1 tab(s) orally once a day  lidocaine 4% topical film: Apply topically to affected area once a day  meclizine 25 mg oral tablet: 1 tab(s) orally every 12 hours  polyethylene glycol 3350 oral powder for reconstitution: 17 gram(s) orally once a day  rosuvastatin 10 mg oral tablet: 1 tab(s) orally once a day  senna oral tablet: 2 tab(s) orally once a day (at bedtime)    vestibular rehab. :   acetaminophen 325 mg oral tablet: 2 tab(s) orally every 6 hours, As needed, Temp greater or equal to 38C (100.4F), Mild Pain (1 - 3)  aluminum hydroxide-magnesium hydroxide 200 mg-200 mg/5 mL oral suspension: 30 milliliter(s) orally every 6 hours, As needed, Dyspepsia  amLODIPine 5 mg oral tablet: 1 tab(s) orally once a day  aspirin 81 mg oral tablet, chewable: 1 tab(s) orally once a day  calcitriol 0.25 mcg oral capsule: 1 cap(s) orally 3 times a week - [facility]  carvedilol 25 mg oral tablet: 1 tab(s) orally 2 times a day  cilostazol 100 mg oral tablet: 1 tab(s) orally 2 times a day  clopidogrel 75 mg oral tablet: 1 tab(s) orally once a day  lidocaine 4% topical film: Apply topically to affected area once a day  meclizine 25 mg oral tablet: 1 tab(s) orally every 12 hours  polyethylene glycol 3350 oral powder for reconstitution: 17 gram(s) orally once a day  rosuvastatin 10 mg oral tablet: 1 tab(s) orally once a day  senna oral tablet: 2 tab(s) orally once a day (at bedtime)

## 2022-01-05 NOTE — DISCHARGE NOTE PROVIDER - CARE PROVIDERS DIRECT ADDRESSES
[email or web address]

## 2022-01-05 NOTE — PROGRESS NOTE ADULT - PROBLEM SELECTOR PLAN 9
Hep sq for dvt ppx   DispO: PT eval pending
Hep sq for dvt ppx   DispO: PT eval pending, neuro eval and MRI head
Hep sq for dvt ppx   DispO: PT recommending [facility]
Hep sq for dvt ppx   PT eval pending
Hep sq for dvt ppx   DispO: PT eval pending
Hep sq for dvt ppx   DispO: PT eval pending
Hep sq for dvt ppx   DispO: PT recommending [facility]
Hep sq for dvt ppx   DispO: PT recommending [facility]-patient wants to go home, will discuss with family

## 2022-01-05 NOTE — PROGRESS NOTE ADULT - PROBLEM SELECTOR PLAN 7
Lipitor 40mg interchange for crestor 10mg
No acute issues, outpt follow up
Lipitor 40mg interchange for crestor 10mg
No acute issues, outpt follow up
Lipitor 40mg interchange for crestor 10mg

## 2022-01-06 ENCOUNTER — NON-APPOINTMENT (OUTPATIENT)
Age: 82
End: 2022-01-06

## 2022-02-14 ENCOUNTER — NON-APPOINTMENT (OUTPATIENT)
Age: 82
End: 2022-02-14

## 2022-02-14 NOTE — ED PROVIDER NOTE - NS ED MD DISPO SPECIAL CONSIDERATION1
RN called patient to check on how much medication patient has left, since he has a follow up on 2/18 with Breana Brian. Patient reports he only has one day left of medication. Medication refill for one month sent to patient's requested pharmacy. No changes. Patient last seen 11/5/21. Follow up on 2/18/22.       Gali Darden RN    
Low Suspicion of COVID-19

## 2022-03-01 ENCOUNTER — APPOINTMENT (OUTPATIENT)
Dept: INTERNAL MEDICINE | Facility: CLINIC | Age: 82
End: 2022-03-01
Payer: MEDICARE

## 2022-03-01 ENCOUNTER — LABORATORY RESULT (OUTPATIENT)
Age: 82
End: 2022-03-01

## 2022-03-01 VITALS
OXYGEN SATURATION: 98 % | DIASTOLIC BLOOD PRESSURE: 70 MMHG | HEART RATE: 72 BPM | TEMPERATURE: 98.4 F | SYSTOLIC BLOOD PRESSURE: 124 MMHG

## 2022-03-01 VITALS — DIASTOLIC BLOOD PRESSURE: 60 MMHG | SYSTOLIC BLOOD PRESSURE: 138 MMHG

## 2022-03-01 PROCEDURE — 36415 COLL VENOUS BLD VENIPUNCTURE: CPT

## 2022-03-01 PROCEDURE — 99495 TRANSJ CARE MGMT MOD F2F 14D: CPT | Mod: 25

## 2022-03-01 RX ORDER — ROSUVASTATIN CALCIUM 10 MG/1
10 TABLET, FILM COATED ORAL
Qty: 90 | Refills: 0 | Status: DISCONTINUED | COMMUNITY
Start: 2020-10-12 | End: 2022-03-01

## 2022-03-01 NOTE — PHYSICAL EXAM
[Normal Sclera/Conjunctiva] : normal sclera/conjunctiva [Normal Outer Ear/Nose] : the outer ears and nose were normal in appearance [No JVD] : no jugular venous distention [No Lymphadenopathy] : no lymphadenopathy [Supple] : supple [Thyroid Normal, No Nodules] : the thyroid was normal and there were no nodules present [Normal Rate] : normal rate  [Regular Rhythm] : with a regular rhythm [Normal S1, S2] : normal S1 and S2 [No Abdominal Bruit] : a ~M bruit was not heard ~T in the abdomen [Normal] : affect was normal and insight and judgment were intact [de-identified] : 3/6 systolic murmur [de-identified] : Bilateral one plus edema [de-identified] : Strength five out of five grossly intact

## 2022-03-01 NOTE — REVIEW OF SYSTEMS
[Lower Ext Edema] : lower extremity edema [Muscle Weakness] : muscle weakness [Negative] : Genitourinary [Chest Pain] : no chest pain [Palpitations] : no palpitations [Claudication] : no  leg claudication [Orthopena] : no orthopnea [Paroxysmal Nocturnal Dyspnea] : no paroxysmal nocturnal dyspnea [Joint Pain] : no joint pain [Joint Stiffness] : no joint stiffness [Muscle Pain] : no muscle pain [Back Pain] : no back pain [Joint Swelling] : no joint swelling

## 2022-03-01 NOTE — HISTORY OF PRESENT ILLNESS
[Post-hospitalization from ___ Hospital] : Post-hospitalization from [unfilled] Hospital [Admitted on: ___] : The patient was admitted on [unfilled] [Discharged on ___] : discharged on [unfilled] [Patient Contacted By: ____] : and contacted by [unfilled] [FreeTextEntry3] : Vertigo [FreeTextEntry2] : The patient is a 81-year-old male with history of coronary artery disease peripheral vascular artery renal stenosis status post stent, hypertension hyperlipidemia gout, lumbar disc disease, carotid artery disease, nonalcoholic fatty liver, anemia, aortic dilatation who presents for follow-up of hospitalization for vertigo. Patient presented to hospital unable to walk vomiting workup included CT negative rule out for MRI was referred to rehab short-term now presents for follow-up patient feels well feels weak of the legs denies dizziness, lightheadedness chest pain palpitation shortness of breath

## 2022-03-01 NOTE — ASSESSMENT
[FreeTextEntry1] : Patient is a 81-year-old male with history of recent hospitalization for vertigo vomiting, history of coronary disease peripheral vascular disease carotid artery disease renal artery disease, hypertension hyperlipidemia, fatty liver, lumbar disc disease who presents for follow-up of hospitalization for vertigo\par \par 1. Vertigo\par etiology unclear will out benign positional vertigo\par CT negative\par referral for vestibular rehab\par referral to ENT for evaluation\par \par 2. Renal insufficiency\par check BUN creatinine\par \par \par 3. Coronary disease/peripheral vascular disease\par /carotid artery disease/renal artery stenosis\par continue aspirin\par follow-up with cardiology\par \par 4. Hypertension\par blood pressure controlled on amlodipine and carvedilol\par Dami 60 C\par \par 5 edema\par elevation consider elastic stockings by cardiology\par patient instructions DC Lasix in hospital\par check BNP\par \par 7 gout\par check uric acid off allopurinol\par follow-up in six weeks

## 2022-03-15 LAB
ALBUMIN SERPL ELPH-MCNC: 4.5 G/DL
ALP BLD-CCNC: 74 U/L
ALT SERPL-CCNC: 8 U/L
ANION GAP SERPL CALC-SCNC: 14 MMOL/L
AST SERPL-CCNC: 9 U/L
BASOPHILS # BLD AUTO: 0.04 K/UL
BASOPHILS NFR BLD AUTO: 0.9 %
BILIRUB SERPL-MCNC: 0.7 MG/DL
BUN SERPL-MCNC: 32 MG/DL
CALCIUM SERPL-MCNC: 8.5 MG/DL
CHLORIDE SERPL-SCNC: 109 MMOL/L
CHOLEST SERPL-MCNC: 109 MG/DL
CO2 SERPL-SCNC: 18 MMOL/L
CREAT SERPL-MCNC: 2.18 MG/DL
EGFR: 30 ML/MIN/1.73M2
EOSINOPHIL # BLD AUTO: 0.16 K/UL
EOSINOPHIL NFR BLD AUTO: 3.7 %
GLUCOSE SERPL-MCNC: 124 MG/DL
HCT VFR BLD CALC: 25 %
HDLC SERPL-MCNC: 38 MG/DL
HGB BLD-MCNC: 8 G/DL
IMM GRANULOCYTES NFR BLD AUTO: 1.2 %
LDLC SERPL CALC-MCNC: 52 MG/DL
LYMPHOCYTES # BLD AUTO: 1.12 K/UL
LYMPHOCYTES NFR BLD AUTO: 26.2 %
MAN DIFF?: NORMAL
MCHC RBC-ENTMCNC: 32 GM/DL
MCHC RBC-ENTMCNC: 36.9 PG
MCV RBC AUTO: 115.2 FL
MONOCYTES # BLD AUTO: 0.4 K/UL
MONOCYTES NFR BLD AUTO: 9.3 %
NEUTROPHILS # BLD AUTO: 2.51 K/UL
NEUTROPHILS NFR BLD AUTO: 58.7 %
NONHDLC SERPL-MCNC: 71 MG/DL
NT-PROBNP SERPL-MCNC: 772 PG/ML
PLATELET # BLD AUTO: 189 K/UL
POTASSIUM SERPL-SCNC: 4.5 MMOL/L
PROT SERPL-MCNC: 6.2 G/DL
RBC # BLD: 2.17 M/UL
RBC # FLD: 22.8 %
SODIUM SERPL-SCNC: 140 MMOL/L
TRIGL SERPL-MCNC: 96 MG/DL
TSH SERPL-ACNC: 4.09 UIU/ML
URATE SERPL-MCNC: 10.1 MG/DL
WBC # FLD AUTO: 4.28 K/UL

## 2022-04-06 ENCOUNTER — LABORATORY RESULT (OUTPATIENT)
Age: 82
End: 2022-04-06

## 2022-04-06 ENCOUNTER — APPOINTMENT (OUTPATIENT)
Dept: CARDIOLOGY | Facility: CLINIC | Age: 82
End: 2022-04-06
Payer: MEDICARE

## 2022-04-06 VITALS
OXYGEN SATURATION: 98 % | TEMPERATURE: 98.5 F | DIASTOLIC BLOOD PRESSURE: 99 MMHG | HEART RATE: 74 BPM | SYSTOLIC BLOOD PRESSURE: 142 MMHG

## 2022-04-06 VITALS
TEMPERATURE: 99.4 F | OXYGEN SATURATION: 99 % | HEART RATE: 70 BPM | SYSTOLIC BLOOD PRESSURE: 137 MMHG | DIASTOLIC BLOOD PRESSURE: 85 MMHG

## 2022-04-06 PROCEDURE — 99214 OFFICE O/P EST MOD 30 MIN: CPT

## 2022-04-06 NOTE — ASSESSMENT
[FreeTextEntry1] : Assessment:\par 1.  Renal artery stenosis\par -s/p L RA stent\par 2.  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  PAD\par 5. 3 vessel coronary calcifications on CT\par s/p CATH March 18th:  m LAD 60%, OM2 60%, RCA \par \par Plan\par 1. BP and HR well controlled, he has no coronary symptoms at the present moment\par 2.  Creatinine has stabliized\par 3.  Daily walking, daily foot checks\par 4.  Conservative management of the PAD\par 5 No changes to his medicaitons.\par 6.  Conitnue lasix every other day.\par 7.  Continue cilostazol 50mg BID\par 8.  Continue DAPT with aspirin and plavix.\par 9.  Anemia labs today\par 10.  I reached out to Dr. Reddy to see if he needs more PT and also to address anemia

## 2022-04-06 NOTE — REASON FOR VISIT
[Follow-Up - Clinic] : a clinic follow-up of [FreeTextEntry2] : PAD [FreeTextEntry1] : 4/6/2022\par Recent admission - vertigo \par Labs with Dr. Reddy - Creatinine is  2.18, Sodium 140\par Hemoglobin was 8 *** (was anemic in hospital as well)\par BNP was 772\par Echo Dec 2021:  mild AS.  normal LV function .  normal RV function \par Cath March 2021:  LAD 60%, Om1 60%, pRCA 100% (chronic total occlusion )\par \par He is home now.\par Has a 24 hour aid name is [employer].\par Balance is ok, walks with a walker\par No falls\par Eating ok, no more vomiting\par \par No wounds on the feet or toes. \par No blood in the stool or the urine.\par He does not take iron\par he remains on aspirin and plavix. \par \par 10/6/2021\par \par He is going for accupuncture\par it is helping somewhat\par Seen by Dr. Medel - he has spinal dz L3-S1\par Undergoing accupunture\par no PT - he declined. \par Otherwise he has no chest pain\par Breathing ok .\par Diuretic every other day, helps with leg swelling\par no wounds on the feet or toes. \par Mild edema of the legs\par \par Labs :\par \par hemoglobin 11.1 (previously 9.9)\par Creatinine 2.10 (previously 2.5)\par \par \par \par \par 6/30/2021\par Complains of calf pain with walking\par R hip pains\par Pins and needs bottom of right foot\par most recent creatinine is 2.39\par He remains on cilostazol 50mg BID\par He remains on plavix\par BP is well controlled\par The left leg is not as bad as the right\par Complains of some ankle pains\par No open wounds or tissue loss on the feet. \par No chest pain \par no chest pressure.  \par \par 3/31/2021\par \par He had R Radial cath\par  of the RCA\par otherwise 60% stenosis\par No chest pain \par no angina\par he has mild to mod claudcation both legs, no rest pain.  not really active.  Will medically managae\par \par Seen by Mohsen Cabral nephrology, all stable.  \par \par Medications:\par Amlodipine 5 mg\par Aspirin 81\par Calcitriol \par Coreg 2 5mg BID\par Plavix 75 mg daily \par Colchicine 0.6 mg daily for gout.  \par Crestor 10 mg daily \par Lasix 40mg every other day\par  \par \par \par BP at home is 160-170 at home. \par His BP here is 148/75\par \par He complains of leg pains, both, entire legs, with walking.  \par \par \par Furosemide 40 mg every other day \par \par \par 1/15/2021\par Discussed arterial duplex results with patient, diffuse disease noted.\par [place] Class 3 claudication.\par Also reporting bilateral healed / healing sores.\par Plans for labs and office visit next week.\par Decision if to proceed with peripheral angiogram on office visit. \par Call office if any worsening symptoms occurring. \par \par Here for followup  12/2/2020\par \par Had followed up with nephrology Dr. Cabral 2 weeks ago\par All good.  Had labwork done, states all is ok\par Needs tingling of the right leg, bottom of the foot\par Hard to move it  sometimes\par Not worse with walking\par he gets tired with walking.  The right is worse than the left.  \par \par Urinating no problems\par \par \par \par

## 2022-04-11 LAB
BASOPHILS # BLD AUTO: 0.05 K/UL
BASOPHILS NFR BLD AUTO: 0.9 %
EOSINOPHIL # BLD AUTO: 0.24 K/UL
EOSINOPHIL NFR BLD AUTO: 4.5 %
FERRITIN SERPL-MCNC: 126 NG/ML
FOLATE SERPL-MCNC: >20 NG/ML
HCT VFR BLD CALC: 29.9 %
HGB BLD-MCNC: 9.5 G/DL
IMM GRANULOCYTES NFR BLD AUTO: 0.6 %
IRON SATN MFR SERPL: 42 %
IRON SERPL-MCNC: 117 UG/DL
LYMPHOCYTES # BLD AUTO: 1.39 K/UL
LYMPHOCYTES NFR BLD AUTO: 25.9 %
MAN DIFF?: NORMAL
MCHC RBC-ENTMCNC: 31.8 GM/DL
MCHC RBC-ENTMCNC: 38.2 PG
MCV RBC AUTO: 120.1 FL
MONOCYTES # BLD AUTO: 0.48 K/UL
MONOCYTES NFR BLD AUTO: 9 %
NEUTROPHILS # BLD AUTO: 3.17 K/UL
NEUTROPHILS NFR BLD AUTO: 59.1 %
PLATELET # BLD AUTO: 209 K/UL
RBC # BLD: 2.49 M/UL
RBC # FLD: 19.5 %
TIBC SERPL-MCNC: 276 UG/DL
TRANSFERRIN SERPL-MCNC: 206 MG/DL
UIBC SERPL-MCNC: 159 UG/DL
VIT B12 SERPL-MCNC: 539 PG/ML
WBC # FLD AUTO: 5.36 K/UL

## 2022-05-10 ENCOUNTER — OUTPATIENT (OUTPATIENT)
Dept: OUTPATIENT SERVICES | Facility: HOSPITAL | Age: 82
LOS: 1 days | Discharge: HOME | End: 2022-05-10

## 2022-05-10 ENCOUNTER — APPOINTMENT (OUTPATIENT)
Dept: OTOLARYNGOLOGY | Facility: CLINIC | Age: 82
End: 2022-05-10

## 2022-05-10 DIAGNOSIS — D64.9 ANEMIA, UNSPECIFIED: ICD-10-CM

## 2022-05-10 DIAGNOSIS — Z90.79 ACQUIRED ABSENCE OF OTHER GENITAL ORGAN(S): Chronic | ICD-10-CM

## 2022-05-10 DIAGNOSIS — Z98.49 CATARACT EXTRACTION STATUS, UNSPECIFIED EYE: Chronic | ICD-10-CM

## 2022-05-10 DIAGNOSIS — Z98.890 OTHER SPECIFIED POSTPROCEDURAL STATES: Chronic | ICD-10-CM

## 2022-05-10 DIAGNOSIS — Z86.79 PERSONAL HISTORY OF OTHER DISEASES OF THE CIRCULATORY SYSTEM: Chronic | ICD-10-CM

## 2022-05-12 ENCOUNTER — RESULT REVIEW (OUTPATIENT)
Age: 82
End: 2022-05-12

## 2022-05-12 ENCOUNTER — APPOINTMENT (OUTPATIENT)
Dept: HEMATOLOGY ONCOLOGY | Facility: CLINIC | Age: 82
End: 2022-05-12
Payer: MEDICARE

## 2022-05-12 ENCOUNTER — NON-APPOINTMENT (OUTPATIENT)
Age: 82
End: 2022-05-12

## 2022-05-12 ENCOUNTER — OUTPATIENT (OUTPATIENT)
Dept: OUTPATIENT SERVICES | Facility: HOSPITAL | Age: 82
LOS: 1 days | End: 2022-05-12
Payer: MEDICARE

## 2022-05-12 VITALS
HEIGHT: 64.96 IN | TEMPERATURE: 97.5 F | BODY MASS INDEX: 31.55 KG/M2 | SYSTOLIC BLOOD PRESSURE: 150 MMHG | OXYGEN SATURATION: 98 % | WEIGHT: 189.36 LBS | DIASTOLIC BLOOD PRESSURE: 72 MMHG | RESPIRATION RATE: 16 BRPM | HEART RATE: 74 BPM

## 2022-05-12 DIAGNOSIS — Z90.79 ACQUIRED ABSENCE OF OTHER GENITAL ORGAN(S): Chronic | ICD-10-CM

## 2022-05-12 DIAGNOSIS — D53.9 NUTRITIONAL ANEMIA, UNSPECIFIED: ICD-10-CM

## 2022-05-12 DIAGNOSIS — Z98.49 CATARACT EXTRACTION STATUS, UNSPECIFIED EYE: Chronic | ICD-10-CM

## 2022-05-12 DIAGNOSIS — Z98.890 OTHER SPECIFIED POSTPROCEDURAL STATES: Chronic | ICD-10-CM

## 2022-05-12 DIAGNOSIS — Z86.79 PERSONAL HISTORY OF OTHER DISEASES OF THE CIRCULATORY SYSTEM: Chronic | ICD-10-CM

## 2022-05-12 LAB
ANISOCYTOSIS BLD QL: SLIGHT — SIGNIFICANT CHANGE UP
BASOPHILS # BLD AUTO: 0.03 K/UL — SIGNIFICANT CHANGE UP (ref 0–0.2)
BASOPHILS NFR BLD AUTO: 0.6 % — SIGNIFICANT CHANGE UP (ref 0–2)
DACRYOCYTES BLD QL SMEAR: SLIGHT — SIGNIFICANT CHANGE UP
DAT POLY-SP REAG RBC QL: NEGATIVE — SIGNIFICANT CHANGE UP
ELLIPTOCYTES BLD QL SMEAR: SLIGHT — SIGNIFICANT CHANGE UP
EOSINOPHIL # BLD AUTO: 0.2 K/UL — SIGNIFICANT CHANGE UP (ref 0–0.5)
EOSINOPHIL NFR BLD AUTO: 3.7 % — SIGNIFICANT CHANGE UP (ref 0–6)
HCT VFR BLD CALC: 31.5 % — LOW (ref 39–50)
HGB BLD-MCNC: 10.6 G/DL — LOW (ref 13–17)
IMM GRANULOCYTES NFR BLD AUTO: 0.9 % — SIGNIFICANT CHANGE UP (ref 0–1.5)
LYMPHOCYTES # BLD AUTO: 1.11 K/UL — SIGNIFICANT CHANGE UP (ref 1–3.3)
LYMPHOCYTES # BLD AUTO: 20.7 % — SIGNIFICANT CHANGE UP (ref 13–44)
MACROCYTES BLD QL: SLIGHT — SIGNIFICANT CHANGE UP
MCHC RBC-ENTMCNC: 33.7 G/DL — SIGNIFICANT CHANGE UP (ref 32–36)
MCHC RBC-ENTMCNC: 37.7 PG — HIGH (ref 27–34)
MCV RBC AUTO: 112.1 FL — HIGH (ref 80–100)
MICROCYTES BLD QL: SLIGHT — SIGNIFICANT CHANGE UP
MONOCYTES # BLD AUTO: 0.42 K/UL — SIGNIFICANT CHANGE UP (ref 0–0.9)
MONOCYTES NFR BLD AUTO: 7.9 % — SIGNIFICANT CHANGE UP (ref 2–14)
NEUTROPHILS # BLD AUTO: 3.54 K/UL — SIGNIFICANT CHANGE UP (ref 1.8–7.4)
NEUTROPHILS NFR BLD AUTO: 66.2 % — SIGNIFICANT CHANGE UP (ref 43–77)
NRBC # BLD: 0 /100 WBCS — SIGNIFICANT CHANGE UP (ref 0–0)
PLAT MORPH BLD: NORMAL — SIGNIFICANT CHANGE UP
PLATELET # BLD AUTO: 180 K/UL — SIGNIFICANT CHANGE UP (ref 150–400)
POIKILOCYTOSIS BLD QL AUTO: SLIGHT — SIGNIFICANT CHANGE UP
RBC # BLD: 2.81 M/UL — LOW (ref 4.2–5.8)
RBC # FLD: 17.1 % — HIGH (ref 10.3–14.5)
RBC BLD AUTO: ABNORMAL
RETICS #: 71.5 K/UL — SIGNIFICANT CHANGE UP (ref 25–125)
RETICS/RBC NFR: 2.6 % — HIGH (ref 0.5–2.5)
SCHISTOCYTES BLD QL AUTO: SLIGHT — SIGNIFICANT CHANGE UP
WBC # BLD: 5.35 K/UL — SIGNIFICANT CHANGE UP (ref 3.8–10.5)
WBC # FLD AUTO: 5.35 K/UL — SIGNIFICANT CHANGE UP (ref 3.8–10.5)

## 2022-05-12 PROCEDURE — 86880 COOMBS TEST DIRECT: CPT

## 2022-05-12 PROCEDURE — 86901 BLOOD TYPING SEROLOGIC RH(D): CPT

## 2022-05-12 PROCEDURE — 99205 OFFICE O/P NEW HI 60 MIN: CPT

## 2022-05-12 PROCEDURE — 86850 RBC ANTIBODY SCREEN: CPT

## 2022-05-12 PROCEDURE — 86900 BLOOD TYPING SEROLOGIC ABO: CPT

## 2022-05-12 NOTE — HISTORY OF PRESENT ILLNESS
[de-identified] : 81 year-old Mr. MACARIO is seen in consult for macrocytic  anemia (Hgb 9.5, MCV ~125). Patient has multiple medical conditions including stage-3 CKD. He has no symptoms of anemia and has no complaints.

## 2022-05-12 NOTE — ASSESSMENT
[FreeTextEntry1] : 81 year-old Mr. MACARIO is seen in consult for macrocytic anemia (HGB ~9.5, MCV ~125), WBC and platelet counts are preserved. Patient is asymptomatic. The available labs reviewed, could not determine the cause of macrocytosis. The anemia is multifactorial -- AOCD/AORF and primary bone marrow disorder like MDS. It may also represent anemia of old age (age related clonal hematopoiesis, ARCH). He does not need any treatment yet for his anemia, but may be a candidate for EUGENIA later. He will need a bone marrow biopsy.  \par \par \par [] Anemia, macrocytosis\par - HGB ~9.5, MCV ~125\par - Uncertain etiology \par - Possible bone marrow disorder\par - WIll plan on a bone marrow biopsy \par - Repeat anemia w/u today \par - RTC in 2 months

## 2022-05-12 NOTE — RESULTS/DATA
[FreeTextEntry1] : 5/12/2022\par Available labs reviewed. \par Macrocytic anemia, no etiology could be determined. \par

## 2022-05-13 LAB
ALBUMIN SERPL ELPH-MCNC: 4.7 G/DL
ALP BLD-CCNC: 55 U/L
ALT SERPL-CCNC: 10 U/L
ANION GAP SERPL CALC-SCNC: 14 MMOL/L
AST SERPL-CCNC: 14 U/L
BILIRUB SERPL-MCNC: 0.7 MG/DL
BUN SERPL-MCNC: 38 MG/DL
CALCIUM SERPL-MCNC: 9.2 MG/DL
CHLORIDE SERPL-SCNC: 106 MMOL/L
CO2 SERPL-SCNC: 23 MMOL/L
CREAT SERPL-MCNC: 2.35 MG/DL
EGFR: 27 ML/MIN/1.73M2
EPO SERPL-MCNC: 75.2 MIU/ML
FERRITIN SERPL-MCNC: 118 NG/ML
FOLATE SERPL-MCNC: >20 NG/ML
GLUCOSE SERPL-MCNC: 94 MG/DL
HAPTOGLOB SERPL-MCNC: 107 MG/DL
HCYS SERPL-MCNC: 14.4 UMOL/L
IRON SATN MFR SERPL: 39 %
IRON SERPL-MCNC: 102 UG/DL
LDH SERPL-CCNC: 186 U/L
POTASSIUM SERPL-SCNC: 4.6 MMOL/L
PROT SERPL-MCNC: 7 G/DL
SODIUM SERPL-SCNC: 144 MMOL/L
TIBC SERPL-MCNC: 261 UG/DL
TSH SERPL-ACNC: 3.96 UIU/ML
UIBC SERPL-MCNC: 159 UG/DL
URATE SERPL-MCNC: 9.2 MG/DL
VIT B12 SERPL-MCNC: 572 PG/ML

## 2022-05-16 LAB — CRYOGLOB SERPL-MCNC: NEGATIVE

## 2022-05-17 LAB
CA SERPL QL: NORMAL
COPPER SERPL-MCNC: 87 UG/DL

## 2022-05-20 ENCOUNTER — RX RENEWAL (OUTPATIENT)
Age: 82
End: 2022-05-20

## 2022-05-31 ENCOUNTER — APPOINTMENT (OUTPATIENT)
Dept: INTERNAL MEDICINE | Facility: CLINIC | Age: 82
End: 2022-05-31
Payer: MEDICARE

## 2022-05-31 ENCOUNTER — LABORATORY RESULT (OUTPATIENT)
Age: 82
End: 2022-05-31

## 2022-05-31 ENCOUNTER — NON-APPOINTMENT (OUTPATIENT)
Age: 82
End: 2022-05-31

## 2022-05-31 VITALS — SYSTOLIC BLOOD PRESSURE: 136 MMHG | DIASTOLIC BLOOD PRESSURE: 70 MMHG

## 2022-05-31 DIAGNOSIS — I65.21 OCCLUSION AND STENOSIS OF RIGHT CAROTID ARTERY: ICD-10-CM

## 2022-05-31 PROCEDURE — 93000 ELECTROCARDIOGRAM COMPLETE: CPT

## 2022-05-31 PROCEDURE — 36415 COLL VENOUS BLD VENIPUNCTURE: CPT

## 2022-05-31 PROCEDURE — G0439: CPT

## 2022-05-31 NOTE — DIETITIAN INITIAL EVALUATION ADULT. - ENERGY INTAKE
MD Angela Blanchard RN  No need to see urology   Follow up in 12 months with US             Previous Messages       ----- Message -----   From: Angela Shannon RN   Sent: 5/27/2022   8:31 AM CDT   To: Josef Conte MD     Pt doesn't follow with urology. Cyst is simple based on ultrasound and has been present since 2016. Do you want a consult with urology for this?   ----- Message -----   From: Josef Conte MD   Sent: 5/26/2022   3:51 PM CDT   To: Maritza Hoffman RN     Hi ,   Can you please make sure urology evaluated this US      Good (>75%)

## 2022-06-06 ENCOUNTER — APPOINTMENT (OUTPATIENT)
Dept: ORTHOPEDIC SURGERY | Facility: CLINIC | Age: 82
End: 2022-06-06
Payer: MEDICARE

## 2022-06-06 ENCOUNTER — RX RENEWAL (OUTPATIENT)
Age: 82
End: 2022-06-06

## 2022-06-06 PROCEDURE — 99214 OFFICE O/P EST MOD 30 MIN: CPT

## 2022-06-07 NOTE — PHYSICAL EXAM
[Antalgic] : antalgic [Cane] : ambulates with cane [de-identified] : Examination of the lumbar spine reveals no midline tenderness palpation, step-offs, or skin lesions. Decreased range of motion with respect to flexion, extension, lateral bending, and rotation. No tenderness to palpation of the sciatic notch. No tenderness palpation of the bilateral greater trochanters.  He does have some pain today with range of motion of his right hip which appears to reproduce much of his symptoms. No instability of bilateral lower extremities.  Negative MICHAEL. Negative straight leg raise bilaterally. No bowstring. Negative femoral stretch. 5 out of 5 iliopsoas, hip abductors, hips adductors, quadriceps, hamstrings, gastrocsoleus, tibialis anterior, extensor hallucis longus, peroneals. Grossly intact sensation to light touch bilateral lower extremities. 1+ patellar and Achilles reflexes. Downgoing Babinski. No clonus. Intact proprioception. Palpable pulses. No skin lesion and no edema on the right and left lower extremities. [de-identified] : AP lateral lumbar x-rays reveals lumbar spondylosis and kyphosis.  He appears to have some blocked vertebrae as well.  He does have arthrosis of his hips bilaterally.\par \par Lumbar spine MRI does reveal moderate stenosis from L3-S1 centrally as well as some severe foraminal stenosis from L4-S1.  L4-5 spondylolisthesis.

## 2022-06-07 NOTE — HISTORY OF PRESENT ILLNESS
[de-identified] : Mr. MASOOD MACARIO  is a 81 year old male who presents to the office for a follow-up visit.  He is complaining of right buttock and groin pain/stiffness.  He also has tingling down his right leg.

## 2022-06-07 NOTE — DISCUSSION/SUMMARY
[de-identified] : We discussed further treatment options.  He has been evaluated for possible epidural injections but has been told that he is not a good candidate.  On today's exam symptomatology appears more related to his right hip.  We discussed the role of a therapeutic and diagnostic hip injection.  He would like to proceed with this option.  Referral was given.  Follow-up afterwards or sooner with any changes or worsening of his symptoms.

## 2022-06-09 LAB
ANION GAP SERPL CALC-SCNC: 14 MMOL/L
APPEARANCE: ABNORMAL
BASOPHILS # BLD AUTO: 0.14 K/UL
BASOPHILS NFR BLD AUTO: 2.6 %
BILIRUBIN URINE: NEGATIVE
BLOOD URINE: NEGATIVE
BUN SERPL-MCNC: 26 MG/DL
CALCIUM SERPL-MCNC: 8.8 MG/DL
CHLORIDE SERPL-SCNC: 109 MMOL/L
CHOLEST SERPL-MCNC: 103 MG/DL
CK SERPL-CCNC: 26 U/L
CO2 SERPL-SCNC: 21 MMOL/L
COLOR: YELLOW
CREAT SERPL-MCNC: 2.03 MG/DL
EGFR: 33 ML/MIN/1.73M2
EOSINOPHIL # BLD AUTO: 0.1 K/UL
EOSINOPHIL NFR BLD AUTO: 1.8 %
ESTIMATED AVERAGE GLUCOSE: 114 MG/DL
GLUCOSE QUALITATIVE U: NEGATIVE
GLUCOSE SERPL-MCNC: 121 MG/DL
HBA1C MFR BLD HPLC: 5.6 %
HCT VFR BLD CALC: 28.3 %
HDLC SERPL-MCNC: 39 MG/DL
HGB BLD-MCNC: 9.2 G/DL
KETONES URINE: NEGATIVE
LDLC SERPL CALC-MCNC: 51 MG/DL
LEUKOCYTE ESTERASE URINE: ABNORMAL
LYMPHOCYTES # BLD AUTO: 1.04 K/UL
LYMPHOCYTES NFR BLD AUTO: 19.1 %
MAN DIFF?: NORMAL
MCHC RBC-ENTMCNC: 32.5 GM/DL
MCHC RBC-ENTMCNC: 36.5 PG
MCV RBC AUTO: 112.3 FL
MONOCYTES # BLD AUTO: 0.28 K/UL
MONOCYTES NFR BLD AUTO: 5.2 %
NEUTROPHILS # BLD AUTO: 3.86 K/UL
NEUTROPHILS NFR BLD AUTO: 71.3 %
NITRITE URINE: POSITIVE
NONHDLC SERPL-MCNC: 64 MG/DL
PH URINE: 6
PLATELET # BLD AUTO: 187 K/UL
POTASSIUM SERPL-SCNC: 4.4 MMOL/L
PROTEIN URINE: ABNORMAL
RBC # BLD: 2.52 M/UL
RBC # FLD: 17.1 %
SODIUM SERPL-SCNC: 144 MMOL/L
SPECIFIC GRAVITY URINE: 1.02
TRIGL SERPL-MCNC: 67 MG/DL
UROBILINOGEN URINE: NORMAL
WBC # FLD AUTO: 5.42 K/UL

## 2022-06-30 ENCOUNTER — OUTPATIENT (OUTPATIENT)
Dept: OUTPATIENT SERVICES | Facility: HOSPITAL | Age: 82
LOS: 1 days | Discharge: HOME | End: 2022-06-30

## 2022-06-30 DIAGNOSIS — Z98.49 CATARACT EXTRACTION STATUS, UNSPECIFIED EYE: Chronic | ICD-10-CM

## 2022-06-30 DIAGNOSIS — Z86.79 PERSONAL HISTORY OF OTHER DISEASES OF THE CIRCULATORY SYSTEM: Chronic | ICD-10-CM

## 2022-06-30 DIAGNOSIS — Z90.79 ACQUIRED ABSENCE OF OTHER GENITAL ORGAN(S): Chronic | ICD-10-CM

## 2022-06-30 DIAGNOSIS — D64.9 ANEMIA, UNSPECIFIED: ICD-10-CM

## 2022-06-30 DIAGNOSIS — Z98.890 OTHER SPECIFIED POSTPROCEDURAL STATES: Chronic | ICD-10-CM

## 2022-08-17 ENCOUNTER — APPOINTMENT (OUTPATIENT)
Dept: OTOLARYNGOLOGY | Facility: CLINIC | Age: 82
End: 2022-08-17

## 2022-08-17 VITALS
WEIGHT: 185 LBS | SYSTOLIC BLOOD PRESSURE: 143 MMHG | HEART RATE: 65 BPM | BODY MASS INDEX: 29.73 KG/M2 | HEIGHT: 66 IN | DIASTOLIC BLOOD PRESSURE: 72 MMHG

## 2022-08-17 DIAGNOSIS — H90.3 SENSORINEURAL HEARING LOSS, BILATERAL: ICD-10-CM

## 2022-08-17 PROCEDURE — 92504 EAR MICROSCOPY EXAMINATION: CPT

## 2022-08-17 PROCEDURE — 99204 OFFICE O/P NEW MOD 45 MIN: CPT | Mod: 25

## 2022-08-17 PROCEDURE — 92567 TYMPANOMETRY: CPT

## 2022-08-17 PROCEDURE — 92557 COMPREHENSIVE HEARING TEST: CPT

## 2022-08-17 RX ORDER — AMOXICILLIN AND CLAVULANATE POTASSIUM 875; 125 MG/1; MG/1
875-125 TABLET, COATED ORAL
Qty: 20 | Refills: 0 | Status: COMPLETED | COMMUNITY
Start: 2022-06-07 | End: 2022-08-17

## 2022-08-17 RX ORDER — ALLOPURINOL 300 MG/1
300 TABLET ORAL DAILY
Qty: 30 | Refills: 2 | Status: COMPLETED | COMMUNITY
Start: 2021-09-21 | End: 2022-08-17

## 2022-08-18 PROBLEM — H90.3 SENSORINEURAL HEARING LOSS (SNHL) OF BOTH EARS: Status: ACTIVE | Noted: 2022-08-18

## 2022-08-18 NOTE — DATA REVIEWED
[de-identified] : An audiogram was ordered and performed including pure tones, tympanometry and speech testing for the patients complaint of hearing loss\par I have independently reviewed the patient's audiogram from today and my findings include AD Mild to moderate SNHL 250-8k hz. AS Mild to profound SNHL 250-8k hz. AU Tymp A

## 2022-08-18 NOTE — PHYSICAL EXAM
[Hearing Loss Right Only] : diminished [Hearing Loss Left Only] : diminished [Rinne Test Air Conduction Persists > Bone Conduction Right] : air conduction greater than bone conduction on the right [Rinne Test Air Conduction Persists > Bone Conduction Left] : air conduction greater than bone conduction on the left [Hearing Chou Test (Tuning Fork On Forehead)] : no lateralization of tone [FreeTextEntry8] : Cerumen Impaction. Removed [de-identified] : Bilateral Hypertropy [Normal] : mucosa is normal [Midline] : trachea located in midline position

## 2022-08-18 NOTE — ASSESSMENT
[FreeTextEntry1] : 82 year M with right cerumen impaction, bilateral SNHL and chronic nasal congestion. Patient tolerated cerumen removed without complaints. Audiogram shows AD Mild to moderate SNHL 250-8k hz. AS Mild to profound SNHL 250-8k hz. AU Tymp A\par \par Recommend:\par Cerumen Impaction\par -Discussed not using q-tips or instruments to remove wax\par -Olive or mineral oil 1x times every 2 week to keep ear canal lubricated. Discussed that the ear is a self cleaning structure and just allow it clean itself. If wax builds up can try debrox. Once it gets impacted recommend return to get it cleaned out.\par \par Nasal Congestion\par -Send to pharmacy Flonase nasal spray to be used after completing daily Sinus Rinse\par -Discussed the importance of rinsing the sinus before using nasal spray so that excess mucus lining the nasal cavity can be wash away so medication can penetration the nose.\par -If normal saline sinus rinse + nasal spray is not effective can discuss medicated nasal rinses and imaging for additional evaluation\par \par SNHL\par -Discussed Benefit of Hearings Aids and their value of helping keep brain stimulated by helping hear conversation which keeps a person active and socially connected. Stressed also the association with a lower risk of incident dementia and slower cognitive decline.\par -Clearance Hearing Aid Evaluation Given\par \par -Return to clinic in 2 months or sooner if new/worsen symptoms present

## 2022-08-18 NOTE — REASON FOR VISIT
[Initial Evaluation] : an initial evaluation for [Formal Caregiver] : formal caregiver [FreeTextEntry2] : bilateral clogged ears.

## 2022-08-18 NOTE — HISTORY OF PRESENT ILLNESS
[de-identified] : 82 year old male presents for bilateral clogged ears for the past several months\par States scheduled to get bilateral hearing aids. \par Patient also has noticed he has been having nasal congestion for several years\par Patient has not tried any nasal sprays or sinus rinse\par NO history of recurrent ear or sinus infections\par Denies otalgia, otorrhea, ear infections, dizziness, vertigo, headaches related to hearing.

## 2022-09-06 ENCOUNTER — LABORATORY RESULT (OUTPATIENT)
Age: 82
End: 2022-09-06

## 2022-09-06 ENCOUNTER — APPOINTMENT (OUTPATIENT)
Dept: INTERNAL MEDICINE | Facility: CLINIC | Age: 82
End: 2022-09-06

## 2022-09-06 VITALS — DIASTOLIC BLOOD PRESSURE: 60 MMHG | SYSTOLIC BLOOD PRESSURE: 130 MMHG

## 2022-09-06 VITALS
HEART RATE: 71 BPM | WEIGHT: 196 LBS | HEIGHT: 66 IN | SYSTOLIC BLOOD PRESSURE: 125 MMHG | TEMPERATURE: 97.9 F | BODY MASS INDEX: 31.5 KG/M2 | OXYGEN SATURATION: 98 % | DIASTOLIC BLOOD PRESSURE: 60 MMHG

## 2022-09-06 DIAGNOSIS — R42 DIZZINESS AND GIDDINESS: ICD-10-CM

## 2022-09-06 PROCEDURE — 36415 COLL VENOUS BLD VENIPUNCTURE: CPT

## 2022-09-06 PROCEDURE — 99214 OFFICE O/P EST MOD 30 MIN: CPT | Mod: 25

## 2022-09-06 NOTE — ASSESSMENT
[FreeTextEntry1] : Patient is a 82-year-old male with history of coronary disease, valvular heart disease, peripheral vascular disease, hypertension, hyperlipidemia, ascending aortic dilatation, chronic kidney disease stage III B, anemia possibly secondary to kidney disease, obesity, hyperlipidemia, gout, lumbar disc disease urinary frequency hearing loss vertigo who presents for follow-up\par \par 1. Anemia\par etiology unclear whether it's purely secondary renal disease\par follow-up with hematology\par check CBC\par \par 2 urinary frequency\par UA with reflect the culture await results.\par \par 3 vertigo\par etiology unclear non-orthostatic today\par maybe secondary benign positional vertigo\par continue Antivert referral for rehab gait training\par \par 4. Coronary disease/valvular heart disease/hypertension\par presently asymptomatic\par continue Plavix amlodipine carvedilol 25 BID and Lasix 40 mg once a day\par \par 5 hyperlipidemia\par continue Lipitor 80 mg once a day\par \par 6. Chronic kidney disease\par follow-up with nephrology\par check BUN creatinine\par \par 7. Carotid artery disease mainly external\par  on diet. And Plavix

## 2022-09-06 NOTE — PHYSICAL EXAM
[Normal Sclera/Conjunctiva] : normal sclera/conjunctiva [Normal Outer Ear/Nose] : the outer ears and nose were normal in appearance [Normal Rate] : normal rate  [Regular Rhythm] : with a regular rhythm [Normal S1, S2] : normal S1 and S2 [Normal] : affect was normal and insight and judgment were intact [de-identified] : Born six systolic murmur

## 2022-09-06 NOTE — REVIEW OF SYSTEMS
[Headache] : no headache [Dizziness] : dizziness [Fainting] : no fainting [Confusion] : no confusion [Unsteady Walk] : ataxia [Memory Loss] : no memory loss [Negative] : Musculoskeletal

## 2022-09-06 NOTE — HISTORY OF PRESENT ILLNESS
[FreeTextEntry1] : Hearing loss frequency urination and vertigo [de-identified] : Patient is a 82-year-old male with history of coronary artery disease, ascending aortic dilatation, valvular heart disease, hypertension, obesity, gait instability, gout, leg edema, lumbar radiculopathy, peripheral vascular disease, chronic kidney disease three stage III B, fatty liver disease, history of TIA, vertigo, stroke syndrome, right external carotid disease who presents for follow-up patient complains of mostly instability vertigo usually improved with Antivert denies chest pain, shortness of breath this exertion but has unsteady gait uses walker denies polyuria polydipsia tinnitus headache

## 2022-09-28 LAB
ANION GAP SERPL CALC-SCNC: 15 MMOL/L
APPEARANCE: ABNORMAL
BASOPHILS # BLD AUTO: 0.03 K/UL
BASOPHILS NFR BLD AUTO: 0.6 %
BILIRUBIN URINE: NEGATIVE
BLOOD URINE: ABNORMAL
BUN SERPL-MCNC: 40 MG/DL
CALCIUM SERPL-MCNC: 9 MG/DL
CHLORIDE SERPL-SCNC: 107 MMOL/L
CO2 SERPL-SCNC: 20 MMOL/L
COLOR: ABNORMAL
CREAT SERPL-MCNC: 2.36 MG/DL
EGFR: 27 ML/MIN/1.73M2
EOSINOPHIL # BLD AUTO: 0.14 K/UL
EOSINOPHIL NFR BLD AUTO: 2.6 %
GLUCOSE QUALITATIVE U: NEGATIVE
GLUCOSE SERPL-MCNC: 199 MG/DL
HCT VFR BLD CALC: 28.2 %
HGB BLD-MCNC: 9.5 G/DL
IMM GRANULOCYTES NFR BLD AUTO: 1.5 %
KETONES URINE: NEGATIVE
LEUKOCYTE ESTERASE URINE: ABNORMAL
LYMPHOCYTES # BLD AUTO: 0.85 K/UL
LYMPHOCYTES NFR BLD AUTO: 15.8 %
MAN DIFF?: NORMAL
MCHC RBC-ENTMCNC: 33.7 GM/DL
MCHC RBC-ENTMCNC: 38.3 PG
MCV RBC AUTO: 113.7 FL
MONOCYTES # BLD AUTO: 0.31 K/UL
MONOCYTES NFR BLD AUTO: 5.8 %
NEUTROPHILS # BLD AUTO: 3.97 K/UL
NEUTROPHILS NFR BLD AUTO: 73.7 %
NITRITE URINE: POSITIVE
PH URINE: 6
PLATELET # BLD AUTO: 180 K/UL
POTASSIUM SERPL-SCNC: 4 MMOL/L
PROTEIN URINE: ABNORMAL
RBC # BLD: 2.48 M/UL
RBC # FLD: 20.2 %
SODIUM SERPL-SCNC: 142 MMOL/L
SPECIFIC GRAVITY URINE: 1.02
UROBILINOGEN URINE: NORMAL
WBC # FLD AUTO: 5.38 K/UL

## 2022-10-03 ENCOUNTER — APPOINTMENT (OUTPATIENT)
Dept: CV DIAGNOSITCS | Facility: HOSPITAL | Age: 82
End: 2022-10-03

## 2022-10-03 ENCOUNTER — OUTPATIENT (OUTPATIENT)
Dept: OUTPATIENT SERVICES | Facility: HOSPITAL | Age: 82
LOS: 1 days | End: 2022-10-03
Payer: MEDICARE

## 2022-10-03 DIAGNOSIS — Z86.79 PERSONAL HISTORY OF OTHER DISEASES OF THE CIRCULATORY SYSTEM: Chronic | ICD-10-CM

## 2022-10-03 DIAGNOSIS — I77.810 THORACIC AORTIC ECTASIA: ICD-10-CM

## 2022-10-03 DIAGNOSIS — Z90.79 ACQUIRED ABSENCE OF OTHER GENITAL ORGAN(S): Chronic | ICD-10-CM

## 2022-10-03 DIAGNOSIS — Z98.49 CATARACT EXTRACTION STATUS, UNSPECIFIED EYE: Chronic | ICD-10-CM

## 2022-10-03 DIAGNOSIS — Z98.890 OTHER SPECIFIED POSTPROCEDURAL STATES: Chronic | ICD-10-CM

## 2022-10-03 PROCEDURE — 93306 TTE W/DOPPLER COMPLETE: CPT

## 2022-10-03 PROCEDURE — 93306 TTE W/DOPPLER COMPLETE: CPT | Mod: 26

## 2022-10-11 ENCOUNTER — LABORATORY RESULT (OUTPATIENT)
Age: 82
End: 2022-10-11

## 2022-10-11 ENCOUNTER — APPOINTMENT (OUTPATIENT)
Dept: INTERNAL MEDICINE | Facility: CLINIC | Age: 82
End: 2022-10-11

## 2022-10-11 VITALS
TEMPERATURE: 97.6 F | HEIGHT: 66 IN | WEIGHT: 194 LBS | HEART RATE: 84 BPM | DIASTOLIC BLOOD PRESSURE: 70 MMHG | BODY MASS INDEX: 31.18 KG/M2 | SYSTOLIC BLOOD PRESSURE: 110 MMHG | OXYGEN SATURATION: 96 %

## 2022-10-11 VITALS — DIASTOLIC BLOOD PRESSURE: 60 MMHG | SYSTOLIC BLOOD PRESSURE: 120 MMHG

## 2022-10-11 DIAGNOSIS — R09.81 NASAL CONGESTION: ICD-10-CM

## 2022-10-11 DIAGNOSIS — H93.8X3 OTHER SPECIFIED DISORDERS OF EAR, BILATERAL: ICD-10-CM

## 2022-10-11 DIAGNOSIS — R82.71 BACTERIURIA: ICD-10-CM

## 2022-10-11 DIAGNOSIS — R21 RASH AND OTHER NONSPECIFIC SKIN ERUPTION: ICD-10-CM

## 2022-10-11 DIAGNOSIS — N39.0 URINARY TRACT INFECTION, SITE NOT SPECIFIED: ICD-10-CM

## 2022-10-11 DIAGNOSIS — H61.21 IMPACTED CERUMEN, RIGHT EAR: ICD-10-CM

## 2022-10-11 DIAGNOSIS — M79.642 PAIN IN LEFT HAND: ICD-10-CM

## 2022-10-11 DIAGNOSIS — Z87.898 PERSONAL HISTORY OF OTHER SPECIFIED CONDITIONS: ICD-10-CM

## 2022-10-11 DIAGNOSIS — R82.81 BACTERIURIA: ICD-10-CM

## 2022-10-11 DIAGNOSIS — Z86.73 PERSONAL HISTORY OF TRANSIENT ISCHEMIC ATTACK (TIA), AND CEREBRAL INFARCTION W/OUT RESIDUAL DEFICITS: ICD-10-CM

## 2022-10-11 LAB
BILIRUB UR QL STRIP: NORMAL
CLARITY UR: CLEAR
COLLECTION METHOD: NORMAL
GLUCOSE UR-MCNC: NORMAL
HCG UR QL: 0.2 EU/DL
HGB UR QL STRIP.AUTO: NORMAL
KETONES UR-MCNC: NORMAL
LEUKOCYTE ESTERASE UR QL STRIP: NORMAL
NITRITE UR QL STRIP: NORMAL
PH UR STRIP: 7
PROT UR STRIP-MCNC: 100
SP GR UR STRIP: 1.02

## 2022-10-11 PROCEDURE — 81003 URINALYSIS AUTO W/O SCOPE: CPT | Mod: QW

## 2022-10-11 PROCEDURE — 99214 OFFICE O/P EST MOD 30 MIN: CPT | Mod: 25

## 2022-10-11 PROCEDURE — 36415 COLL VENOUS BLD VENIPUNCTURE: CPT

## 2022-10-11 PROCEDURE — G0008: CPT

## 2022-10-11 PROCEDURE — 90662 IIV NO PRSV INCREASED AG IM: CPT

## 2022-10-11 RX ORDER — FUROSEMIDE 40 MG/1
40 TABLET ORAL
Qty: 40 | Refills: 1 | Status: DISCONTINUED | COMMUNITY
Start: 2021-05-03 | End: 2022-10-11

## 2022-10-11 RX ORDER — SULFAMETHOXAZOLE AND TRIMETHOPRIM 800; 160 MG/1; MG/1
800-160 TABLET ORAL TWICE DAILY
Qty: 14 | Refills: 0 | Status: DISCONTINUED | COMMUNITY
Start: 2022-09-28 | End: 2022-10-11

## 2022-10-11 NOTE — PHYSICAL EXAM
[Normal Sclera/Conjunctiva] : normal sclera/conjunctiva [Normal Outer Ear/Nose] : the outer ears and nose were normal in appearance [Normal Rate] : normal rate  [Regular Rhythm] : with a regular rhythm [Normal S1, S2] : normal S1 and S2 [Normal Supraclavicular Nodes] : no supraclavicular lymphadenopathy [Normal Posterior Cervical Nodes] : no posterior cervical lymphadenopathy [Normal Anterior Cervical Nodes] : no anterior cervical lymphadenopathy [Normal] : affect was normal and insight and judgment were intact [de-identified] : 2/6 systolic murmur with occasional ectopic. [de-identified] : Bilateral rash on shins

## 2022-10-11 NOTE — HISTORY OF PRESENT ILLNESS
[FreeTextEntry1] : Follow-up for anemia, dysuria, coronary disease [de-identified] : The patient is a 82-year-old male with history of overweight, gait instability, coronary artery disease, ascending aortic dilatation, carotid artery disease, gout, venous insufficiency, anemia, hypertension, hyperlipidemia, nonalcoholic fatty liver disease, peripheral vascular disease, spinal stenosis lumbar disc disease history of TIA who presents for follow-up patient feels well notes decreased in urination also deseed furosemide denies chest pain, shortness of breath this exertion palpitation lightheadedness dizziness feels well

## 2022-10-11 NOTE — REVIEW OF SYSTEMS
[Constipation] : constipation [Dysuria] : dysuria [Incontinence] : incontinence [Frequency] : frequency [Joint Pain] : joint pain [Muscle Pain] : muscle pain [Negative] : Heme/Lymph [Abdominal Pain] : no abdominal pain [Nausea] : no nausea [Diarrhea] : no diarrhea [Vomiting] : no vomiting [Heartburn] : no heartburn [Melena] : no melena [Hesitancy] : no hesitancy [Nocturia] : no nocturia [Hematuria] : no hematuria [Impotence] : no impotency [Poor Libido] : libido not poor [Joint Stiffness] : no joint stiffness [Muscle Weakness] : no muscle weakness [Back Pain] : no back pain [Joint Swelling] : no joint swelling

## 2022-10-11 NOTE — ASSESSMENT
[FreeTextEntry1] : HyperPatient is a 82-year-old male with history of coronary disease, peripheral vascular disease, renal artery disease, carotid artery disease, tension, hyperlipidemia, obesity, ascending aortic dilatation, chronic renal disease stage III B gout, gait instability, lumbar disc disease, anemia, history of stroke, fatty liver disease, who presents for follow-up\par \par 1 polyuria/UTI\par patient symptoms have improved will check point-of-care urine\par repeat urine showed trace leukocytes protein\par \par 2 coronary disease peripheral vascular disease renal artery disease status post stent/carotid artery disease\par aggressive cholesterol-lowering\par follow-up with cardiology\par aspirin a day and Plavix 75 mg a day\par check lipid profile\par \par 3 chronic renal disease\par check BUN creatinine\par \par 4. Macrocytic anemia\par check CBC follow-up with hematology\par \par 5 hyperlipidemia\par continue statin follow-up with cardiology check lipid profile\par continue atorvastatin 80 mg once a day \par \par 6. Hypertension\par continue amlodipine five carvedilol 25 mg twice a day\par \par 7 bilateral rash on shins stasis dermatitis/eczema\par will start steroid cream bilaterally and elevation\par \par 8 health maintenance\par hi dose flu shot today\par follow-up in three months\par recommended COVID booster

## 2022-10-11 NOTE — HEALTH RISK ASSESSMENT
[Fair] :  ~his/her~ mood as fair [Never] : Never [No] : No [No falls in past year] : Patient reported no falls in the past year [0] : 2) Feeling down, depressed, or hopeless: Not at all (0) [PHQ-2 Negative - No further assessment needed] : PHQ-2 Negative - No further assessment needed [HIV test declined] : HIV test declined [Hepatitis C test declined] : Hepatitis C test declined [None] : None [With Family] : lives with family [# of Members in Household ___] :  household currently consist of [unfilled] member(s) [Retired] : retired [College] : College [Audit-CScore] : 0 [de-identified] : Minimal walking [de-identified] : Low sodium [[id number]] : 0 [Change in mental status noted] : No change in mental status noted [Language] : denies difficulty with language [Behavior] : denies difficulty with behavior [Learning/Retaining New Information] : denies difficulty learning/retaining new information [Handling Complex Tasks] : denies difficulty handling complex tasks [Reasoning] : denies difficulty with reasoning [Spatial Ability and Orientation] : denies difficulty with spatial ability and orientation

## 2022-10-11 NOTE — PHYSICAL EXAM
[Normal Sclera/Conjunctiva] : normal sclera/conjunctiva [Normal Outer Ear/Nose] : the outer ears and nose were normal in appearance [Normal Rate] : normal rate  [Regular Rhythm] : with a regular rhythm [No Carotid Bruits] : no carotid bruits [No Abdominal Bruit] : a ~M bruit was not heard ~T in the abdomen [No Varicosities] : no varicosities [No Palpable Aorta] : no palpable aorta [No Extremity Clubbing/Cyanosis] : no extremity clubbing/cyanosis [Normal Appearance] : normal in appearance [No Masses] : no palpable masses [No Axillary Lymphadenopathy] : no axillary lymphadenopathy [Normal Sphincter Tone] : normal sphincter tone [No Mass] : no mass [Penis Abnormality] : normal uncircumcised penis [Scrotum] : the scrotum was normal [Prostate Enlarged] : was enlarged [Normal] : affect was normal and insight and judgment were intact [Stool Occult Blood] : stool negative for occult blood [Prostate Nodule] : did not have a nodule [Prostate Tenderness] : was not tender [Prostate Fluctuant] : was not fluctuant [de-identified] : 3/6 systolic murmur [de-identified] : Bilateral edema with stasis changes [FreeTextEntry1] : Doc stools quiet negative [de-identified] : Some rash scaling the sun chest

## 2022-10-11 NOTE — HISTORY OF PRESENT ILLNESS
[FreeTextEntry1] : Comprehensive annual physical examination follow-up for multiple medical issues [de-identified] : The patient is a 81-year-old male with history of obesity, coronary disease, aortic dilatation ascending, peripheral vascular disease, carotid artery disease, renal artery disease status post stent, hypertension, hyperlipidemia, gout, nonalcoholic fatty liver, gout, chronic constipation, lumbar sacral disease and stasis dermatitis with edema Who presents for comprehensive annual physical examination patient main account complains of right groin and hip area pain with standing notes some left hand swelling pain denies chest pain, shortness of breath distant exertion palpitation lightheadedness dizziness patient notes using Pepto-Bismol for constipation over the last few days

## 2022-10-12 LAB
ANION GAP SERPL CALC-SCNC: 12 MMOL/L
BASOPHILS # BLD AUTO: 0.02 K/UL
BASOPHILS NFR BLD AUTO: 0.4 %
BUN SERPL-MCNC: 28 MG/DL
CALCIUM SERPL-MCNC: 8.6 MG/DL
CHLORIDE SERPL-SCNC: 107 MMOL/L
CHOLEST SERPL-MCNC: 98 MG/DL
CO2 SERPL-SCNC: 24 MMOL/L
CREAT SERPL-MCNC: 2.32 MG/DL
EGFR: 28 ML/MIN/1.73M2
EOSINOPHIL # BLD AUTO: 0.16 K/UL
EOSINOPHIL NFR BLD AUTO: 3.5 %
GLUCOSE SERPL-MCNC: 208 MG/DL
HCT VFR BLD CALC: 25.8 %
HDLC SERPL-MCNC: 38 MG/DL
HGB BLD-MCNC: 8.4 G/DL
IMM GRANULOCYTES NFR BLD AUTO: 0.9 %
LDLC SERPL CALC-MCNC: 37 MG/DL
LYMPHOCYTES # BLD AUTO: 0.88 K/UL
LYMPHOCYTES NFR BLD AUTO: 19.2 %
MAN DIFF?: NORMAL
MCHC RBC-ENTMCNC: 32.6 GM/DL
MCHC RBC-ENTMCNC: 37.7 PG
MCV RBC AUTO: 115.7 FL
MONOCYTES # BLD AUTO: 0.3 K/UL
MONOCYTES NFR BLD AUTO: 6.6 %
NEUTROPHILS # BLD AUTO: 3.18 K/UL
NEUTROPHILS NFR BLD AUTO: 69.4 %
NONHDLC SERPL-MCNC: 61 MG/DL
PLATELET # BLD AUTO: 163 K/UL
POTASSIUM SERPL-SCNC: 4.2 MMOL/L
RBC # BLD: 2.23 M/UL
RBC # FLD: 20 %
SODIUM SERPL-SCNC: 143 MMOL/L
TRIGL SERPL-MCNC: 117 MG/DL
WBC # FLD AUTO: 4.58 K/UL

## 2022-10-18 ENCOUNTER — LABORATORY RESULT (OUTPATIENT)
Age: 82
End: 2022-10-18

## 2022-10-27 ENCOUNTER — APPOINTMENT (OUTPATIENT)
Dept: HEMATOLOGY ONCOLOGY | Facility: CLINIC | Age: 82
End: 2022-10-27

## 2022-10-27 VITALS
BODY MASS INDEX: 29.73 KG/M2 | DIASTOLIC BLOOD PRESSURE: 56 MMHG | TEMPERATURE: 97 F | RESPIRATION RATE: 16 BRPM | HEART RATE: 77 BPM | OXYGEN SATURATION: 100 % | SYSTOLIC BLOOD PRESSURE: 123 MMHG | HEIGHT: 66 IN | WEIGHT: 184.99 LBS

## 2022-10-27 PROCEDURE — 99213 OFFICE O/P EST LOW 20 MIN: CPT

## 2022-10-28 ENCOUNTER — OUTPATIENT (OUTPATIENT)
Dept: OUTPATIENT SERVICES | Facility: HOSPITAL | Age: 82
LOS: 1 days | Discharge: HOME | End: 2022-10-28

## 2022-10-28 DIAGNOSIS — Z86.79 PERSONAL HISTORY OF OTHER DISEASES OF THE CIRCULATORY SYSTEM: Chronic | ICD-10-CM

## 2022-10-28 DIAGNOSIS — Z98.890 OTHER SPECIFIED POSTPROCEDURAL STATES: Chronic | ICD-10-CM

## 2022-10-28 DIAGNOSIS — Z98.49 CATARACT EXTRACTION STATUS, UNSPECIFIED EYE: Chronic | ICD-10-CM

## 2022-10-28 DIAGNOSIS — Z90.79 ACQUIRED ABSENCE OF OTHER GENITAL ORGAN(S): Chronic | ICD-10-CM

## 2022-10-28 DIAGNOSIS — D64.9 ANEMIA, UNSPECIFIED: ICD-10-CM

## 2022-10-28 NOTE — HISTORY OF PRESENT ILLNESS
[de-identified] : 81 year-old Mr. MACARIO is seen in consult for macrocytic  anemia (Hgb 9.5, MCV ~125). Patient has multiple medical conditions including stage-3 CKD. He has no symptoms of anemia and has no complaints.  [de-identified] : 05/12/22:\par 81 year-old Mr. MACARIO is seen in consult for macrocytic  anemia (Hgb 9.5, MCV ~125). Patient has multiple medical conditions including stage-3 CKD. He has no symptoms of anemia and has no complaints. \par \par 10/27/22:\par Patient presenting today for follow up for macrocytic anemia. Overall feels well. Endorses some fatigue and shortness of breath with activity. Denies headache, dizziness, Ambulates with walker. He is being followed by his nephrologist who manages his hypertension. Endorses taking diuretics every other day.

## 2022-10-28 NOTE — RESULTS/DATA
[FreeTextEntry1] : 10/27/2022\par (Labs on 10/18/2022): Hgb stable at 9.0, normal WBC, platelets\par Creatinine 2.32\par eGFR 28\par \par \par 5/12/2022\par Available labs reviewed. \par Macrocytic anemia, no etiology could be determined. \par

## 2022-10-28 NOTE — ASSESSMENT
[FreeTextEntry1] : 81 year-old Mr. MACARIO is seen in follow up for macrocytic anemia (HGB ~9.5, MCV ~125), WBC and platelet counts are preserved. Patient is asymptomatic. The available labs reviewed, could not determine the cause of macrocytosis. The anemia is multifactorial -- AOCD/AORF and primary bone marrow disorder like MDS. It may also represent anemia of old age (age related clonal hematopoiesis, ARCH). He is agreeable to bone marrow biopsy. He will be started on EUGENIA. \par \par \par [] Anemia, macrocytosis\par - 10/18/22: HGB ~9.0, MCV ~112.2\par - Anemia is likely AOCD/AORF  \par - Uncertain etiology of macrocytosis as nutrients are WNL \par - Possible bone marrow disorder - planned for bone marrow biopsy \par - In the mean time, will start him on EUGENIA\par - Discussed and reviewed bone marrow procedure and EUGENIA with the patient and daughter  \par - RTC in 2 months

## 2022-10-31 ENCOUNTER — RESULT REVIEW (OUTPATIENT)
Age: 82
End: 2022-10-31

## 2022-10-31 ENCOUNTER — LABORATORY RESULT (OUTPATIENT)
Age: 82
End: 2022-10-31

## 2022-10-31 ENCOUNTER — APPOINTMENT (OUTPATIENT)
Dept: HEMATOLOGY ONCOLOGY | Facility: CLINIC | Age: 82
End: 2022-10-31

## 2022-10-31 VITALS
OXYGEN SATURATION: 99 % | RESPIRATION RATE: 16 BRPM | DIASTOLIC BLOOD PRESSURE: 85 MMHG | HEART RATE: 74 BPM | TEMPERATURE: 98 F | BODY MASS INDEX: 29.73 KG/M2 | HEIGHT: 66 IN | SYSTOLIC BLOOD PRESSURE: 144 MMHG | WEIGHT: 184.99 LBS

## 2022-10-31 LAB
BASOPHILS # BLD AUTO: 0.08 K/UL — SIGNIFICANT CHANGE UP (ref 0–0.2)
BASOPHILS NFR BLD AUTO: 1.4 % — SIGNIFICANT CHANGE UP (ref 0–2)
EOSINOPHIL # BLD AUTO: 0.19 K/UL — SIGNIFICANT CHANGE UP (ref 0–0.5)
EOSINOPHIL NFR BLD AUTO: 3.3 % — SIGNIFICANT CHANGE UP (ref 0–6)
HCT VFR BLD CALC: 25.4 % — LOW (ref 39–50)
HGB BLD-MCNC: 8.3 G/DL — LOW (ref 13–17)
IMM GRANULOCYTES NFR BLD AUTO: 4.3 % — HIGH (ref 0–0.9)
LYMPHOCYTES # BLD AUTO: 1.18 K/UL — SIGNIFICANT CHANGE UP (ref 1–3.3)
LYMPHOCYTES # BLD AUTO: 20.5 % — SIGNIFICANT CHANGE UP (ref 13–44)
MCHC RBC-ENTMCNC: 32.7 G/DL — SIGNIFICANT CHANGE UP (ref 32–36)
MCHC RBC-ENTMCNC: 35.5 PG — HIGH (ref 27–34)
MCV RBC AUTO: 108.5 FL — HIGH (ref 80–100)
MONOCYTES # BLD AUTO: 0.4 K/UL — SIGNIFICANT CHANGE UP (ref 0–0.9)
MONOCYTES NFR BLD AUTO: 7 % — SIGNIFICANT CHANGE UP (ref 2–14)
NEUTROPHILS # BLD AUTO: 3.65 K/UL — SIGNIFICANT CHANGE UP (ref 1.8–7.4)
NEUTROPHILS NFR BLD AUTO: 63.5 % — SIGNIFICANT CHANGE UP (ref 43–77)
NRBC # BLD: 0 /100 WBCS — SIGNIFICANT CHANGE UP (ref 0–0)
PLATELET # BLD AUTO: 236 K/UL — SIGNIFICANT CHANGE UP (ref 150–400)
RBC # BLD: 2.34 M/UL — LOW (ref 4.2–5.8)
RBC # FLD: 18.5 % — HIGH (ref 10.3–14.5)
WBC # BLD: 5.75 K/UL — SIGNIFICANT CHANGE UP (ref 3.8–10.5)
WBC # FLD AUTO: 5.75 K/UL — SIGNIFICANT CHANGE UP (ref 3.8–10.5)

## 2022-10-31 PROCEDURE — 38221 DX BONE MARROW BIOPSIES: CPT | Mod: RT

## 2022-10-31 NOTE — REASON FOR VISIT
[Bone Marrow Biopsy] : bone marrow biopsy [Bone Marrow Aspiration] : bone marrow aspiration [Family Member] : family member [FreeTextEntry2] : Hx of macrocytic anemia with CKD, r/o primary bone marrow disorder.

## 2022-10-31 NOTE — PROCEDURE
[Bone Marrow Biopsy] : bone marrow biopsy [Bone Marrow Aspiration] : bone marrow aspiration  [Patient] : the patient [Verbal Consent Obtained] : verbal consent was obtained prior to the procedure [Patient identification verified] : patient identification verified [Procedure verified and consent obtained] : procedure verified and consent obtained [Laterality verified and correct site marked] : laterality verified and correct site marked [Right] : site: right [Correct positioning] : correct positioning [Prone] : prone [Superior iliac spine was identified] : the superior iliac spine was identified. [The right posterior iliac crest was prepped with betadine and draped, using sterile technique.] : The right posterior iliac crest was prepped with betadine and draped, using sterile technique. [Lidocaine was injected and into the periosteum overlying the site.] : Lidocaine was injected and into the periosteum overlying the site. [Aspirate] : aspirate [Cytogenetics] : cytogenetics [FISH] : FISH [Biopsy] : biopsy [Flow Cytometry] : flow cytometry [] : The patient was instructed to remove the bandage the following AM. The patient may bathe. Acetaminophen may be taken for discomfort, as per package directions.If there are any other problems, the patient was instructed to call the office. The patient verbalized understanding, and is aware of the office contact numbers. [FreeTextEntry1] : Hx of macrocytic anemia with CKD, r/o primary bone marrow disorder.  [FreeTextEntry2] : 6 cc of lidocaine was used for the procedure.\par \par WBC: 5.75\par Hgb: 8.3\par Hct: 25.4\par Plts: 236\par \par Bone marrow aspiration and biopsy were done. MDS panel sent, [facility] myeloid panel requested.\par On ASA< and plavix, post-procedure applied >20min of pressure.

## 2022-11-07 ENCOUNTER — NON-APPOINTMENT (OUTPATIENT)
Age: 82
End: 2022-11-07

## 2022-11-16 ENCOUNTER — APPOINTMENT (OUTPATIENT)
Dept: CARDIOLOGY | Facility: CLINIC | Age: 82
End: 2022-11-16

## 2022-11-29 NOTE — H&P ADULT - NS MD HP PULSE DORSALIS
Patient calling to check status. Please have provider review and sign form    right normal/left normal

## 2022-11-30 ENCOUNTER — RX RENEWAL (OUTPATIENT)
Age: 82
End: 2022-11-30

## 2022-12-16 ENCOUNTER — RESULT REVIEW (OUTPATIENT)
Age: 82
End: 2022-12-16

## 2022-12-16 ENCOUNTER — APPOINTMENT (OUTPATIENT)
Dept: HEMATOLOGY ONCOLOGY | Facility: CLINIC | Age: 82
End: 2022-12-16

## 2022-12-16 ENCOUNTER — NON-APPOINTMENT (OUTPATIENT)
Age: 82
End: 2022-12-16

## 2022-12-16 VITALS
HEART RATE: 109 BPM | TEMPERATURE: 97.9 F | SYSTOLIC BLOOD PRESSURE: 157 MMHG | WEIGHT: 193.34 LBS | BODY MASS INDEX: 31.21 KG/M2 | RESPIRATION RATE: 16 BRPM | OXYGEN SATURATION: 98 % | DIASTOLIC BLOOD PRESSURE: 79 MMHG

## 2022-12-16 LAB
ALBUMIN SERPL ELPH-MCNC: 4.9 G/DL
ALP BLD-CCNC: 63 U/L
ALT SERPL-CCNC: 11 U/L
ANION GAP SERPL CALC-SCNC: 12 MMOL/L
AST SERPL-CCNC: 15 U/L
BASOPHILS # BLD AUTO: 0.04 K/UL — SIGNIFICANT CHANGE UP (ref 0–0.2)
BASOPHILS NFR BLD AUTO: 0.7 % — SIGNIFICANT CHANGE UP (ref 0–2)
BILIRUB SERPL-MCNC: 0.9 MG/DL
BUN SERPL-MCNC: 28 MG/DL
CALCIUM SERPL-MCNC: 9.4 MG/DL
CHLORIDE SERPL-SCNC: 102 MMOL/L
CO2 SERPL-SCNC: 26 MMOL/L
CREAT SERPL-MCNC: 2.23 MG/DL
EGFR: 29 ML/MIN/1.73M2
EOSINOPHIL # BLD AUTO: 0.16 K/UL — SIGNIFICANT CHANGE UP (ref 0–0.5)
EOSINOPHIL NFR BLD AUTO: 2.7 % — SIGNIFICANT CHANGE UP (ref 0–6)
FERRITIN SERPL-MCNC: 193 NG/ML
GLUCOSE SERPL-MCNC: 139 MG/DL
HAPTOGLOB SERPL-MCNC: 92 MG/DL
HCT VFR BLD CALC: 27.4 % — LOW (ref 39–50)
HGB BLD-MCNC: 9.1 G/DL — LOW (ref 13–17)
IMM GRANULOCYTES NFR BLD AUTO: 0.8 % — SIGNIFICANT CHANGE UP (ref 0–0.9)
LDH SERPL-CCNC: 206 U/L
LYMPHOCYTES # BLD AUTO: 1.03 K/UL — SIGNIFICANT CHANGE UP (ref 1–3.3)
LYMPHOCYTES # BLD AUTO: 17.5 % — SIGNIFICANT CHANGE UP (ref 13–44)
MCHC RBC-ENTMCNC: 33.2 G/DL — SIGNIFICANT CHANGE UP (ref 32–36)
MCHC RBC-ENTMCNC: 36.5 PG — HIGH (ref 27–34)
MCV RBC AUTO: 110 FL — HIGH (ref 80–100)
MONOCYTES # BLD AUTO: 0.44 K/UL — SIGNIFICANT CHANGE UP (ref 0–0.9)
MONOCYTES NFR BLD AUTO: 7.5 % — SIGNIFICANT CHANGE UP (ref 2–14)
NEUTROPHILS # BLD AUTO: 4.18 K/UL — SIGNIFICANT CHANGE UP (ref 1.8–7.4)
NEUTROPHILS NFR BLD AUTO: 70.8 % — SIGNIFICANT CHANGE UP (ref 43–77)
NRBC # BLD: 0 /100 WBCS — SIGNIFICANT CHANGE UP (ref 0–0)
PLATELET # BLD AUTO: 191 K/UL — SIGNIFICANT CHANGE UP (ref 150–400)
POTASSIUM SERPL-SCNC: 4.7 MMOL/L
PROT SERPL-MCNC: 7.3 G/DL
RBC # BLD: 2.49 M/UL — LOW (ref 4.2–5.8)
RBC # FLD: 20.8 % — HIGH (ref 10.3–14.5)
SODIUM SERPL-SCNC: 140 MMOL/L
WBC # BLD: 5.9 K/UL — SIGNIFICANT CHANGE UP (ref 3.8–10.5)
WBC # FLD AUTO: 5.9 K/UL — SIGNIFICANT CHANGE UP (ref 3.8–10.5)

## 2022-12-16 PROCEDURE — 99213 OFFICE O/P EST LOW 20 MIN: CPT

## 2022-12-16 NOTE — RESULTS/DATA
[FreeTextEntry1] : 12/16/2022\par HGB on 10/31/2022; 8.3, \par Bone marrow biopsy: \par Patient:   MASOOD MACARIO\par \par \par Accession:                             [id number]\par \par Collected Date/Time:                   10/31/2022 16:49 EDT\par Received Date/Time:                    10/31/2022 16:50 EDT\par \par Hematopathology Addendum Report - Auth (Verified)\par \par Hematopathology Addendum\par Additional immunohistochemical stains (block 1A: p53, ) show\par  increased  positive interstitial mast cells without aggregates. TP53\par  shows less than 1% bright positive cells.\par \par There is no change in the diagnosis.\par \par Verified by: Brooklynn Erazo\par (Electronic Signature)\par Reported on: 11/17/22 09:24 EST, [facility], [address]  [facility] [address] [place] [address]\par Phone: [phone]   Fax: [phone]\par _________________________________________________________________\par \par Disclaimer\par Slide(s) with built in immunohistochemical study control(s) and negative\par  control associated with this case has/have been verified by the sign out\par  pathologist.\par \par For slide(s) without built in controls positive control slides has/have\par  been reviewed and approved by immunohistochemistry lab\par \par These immunohistochemical/ in-situ hybridization tests have been developed\par  and their performance characteristics determined by [facility], Department of Pathology, Division of Immunopathology,\par  [phone] [place] [address].  It has not been cleared\par  or approved by the U.S. Food and Drug Administration.  The FDA has\par  determined that such clearance or approval is not necessary.  This test\par  is used for clinical purposes.  The laboratory is certified under the\par  CLIA-88 as qualified to perform high complexity clinical testing.\par Hematopathology Addendum Report - Auth (Verified)\par \par Hematopathology Addendum\par       Comprehensive Hematopathology Report\par \par Final Diagnosis :\par 1, 2. 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 The bone marrow\par  shows hypercellularity with erythroid hyperplasia and increased ring\par  sideroblasts and dysplastic megakaryocytosis. Interstitial mast cells\par  are increased with normal morphology, few CD25 positive, negative CD30.\par  The findings show myelodysplastic syndrome with multilineage dysplasia\par  (hypolobulated megakaryocytes) and ring sideroblasts. Correlation with\par  cytogenetics, FISH, and somatic mutation analysis to evaluate for complex\par  karyotype, del(5q), -7, del(7q) SF3B1, TP53, other abnormalities is\par  done.\par Please note findings of an   abnormal male karyotype, 46,XY,del(5)(q15q33)\par [13]/46,XY[7],  a positive MDS FISH panel and [facility] Advanced NGS\par  Myeloid Report showing   Tier I: Variants of Strong Clinical Significance:\par  SF3B1 p.Sae577Spy (VAF: 39%),   Tier II: Variants of Potential Clinical\par \par  Significance: DNMT3A p.?  (VAF: 4%), Tier III: Variants of Unknown\par  Clinical Significance:   KIT p.Fcj604Cos  (VAF: 50%) . Based on the\par  additional findings, the MDS classification (ICC) is MDS with del(5q) and\par  with WHO is MDS with low blasts and 5q deletion.\par \par Dr. Mcmahon was notified of the diagnosis on 11/07/2022.\par \par Morphology:\par Microscopic description:\par 1. Biopsy:   Sections of bone marrow biopsy and bone marrow fragments in\par  clot show hypercellularity (50-85% cellularity), erythroid predominance\par  with maturation and increased pronormoblasts, maturing and mature\par  myeloid elements, megakaryocytes at least normal in number with abnormal\par  morphology (increased hypolobulated/small forms), increased interstitial\par  mast cells without aggregates, and iron stores increased.\par \par 2. Aspirate:   Cellular spicules are present, adequate for interpretation.\par   Maturing and mature myeloid and erythroid elements are present with\par  erythroid predominance (M:E ratio (1.16:1).  Megakaryocytes appear at\par  least normal in number with small/hypolobulated morphology. Mast cells\par  are increased in the spicules (round and normally granular).\par \par Bone Marrow Aspirate Differential: (100 Cells).\par Type  % Normal*\par Blast  0% 0-3\par Neutrophil and\par    Precursors   47% 33-63\par Eosinophil  3% 1-5\par Basophil  0% 0-1\par Pronormoblast  5% 0-2\par Normoblast  43% 15-25\par Monocyte  0% 0-2\par Lymphocyte  7% 10-15\par Plasma cell  0% 0-1\par   *Adult Range\par \par Comment\par Iron stain (examined to evaluate for iron stores; see microscopic\par  description) and Giemsa stain (shows appropriate staining pattern) are\par  performed and evaluated on block(s): 1A, 1B\par \par Ancillary Studies:\par Bone marrow aspirate iron stain:   Iron stores are increased; numerous ring\par  sideroblasts are present (greater than 15%).\par \par Flow cytometry:   The myeloid immunophenotypic findings show decreased\par  myeloid granularity, no increase in myeloid immaturity (myeloblasts,\par  0.32% of cells, with normal immunophenotype), normal myeloid antigen\par  maturation pattern, few mast cells, and the presence of hematogones\par  (which are reported to be low in myelodysplasia).\par The lymphocyte immunophenotypic findings show no diagnostic abnormalities.\par \par Immunohistochemical stains   (block 1A: CD34, , myeloperoxidase,\par  CD71, E-cadherin, factor VIII, CD15, CD61, CD25, CD30, p53) show no\par  increase in CD34 positive cells (less than 3%), erythroid predominance\par  (positive with CD71), with clusters of pronormoblasts (positive with\par  E-cadherin); diminished myeloid elements with maturation (positive\par \par  with myeloperoxidase and CD15), and increased megakaryocyte number with\par  dysplastic, small/hypolobulated morphology (positive with factor VIII,\par  CD61). CD30 is negative in mast cells; a few show dim CD25 positivity.\par \par Cytogenetics:  [id number]\par Date Collected:  10/31/2022\par Result:  Abnormal male karyotype\par Karyotype: 46,XY,del(5)(q15q33)[13]/46,XY[7]\par \par Fluorescence in situ Hybridization (FISH):    [id number]\par Result: ABNORMAL FISH MDS PANEL - 5q deletion detected (65%)\par Probe(s) and Location(s):   P8E822/ D5S23 (5p15.2), EGR1 (5q31), D7Z1\par  (7p11.1-q11.1), H3M748 (7q31), CEP-8/D8Z2   ? (8p11.1-q11.1), I74J262\par  (20q12)\par ISCN Nomenclature:  nuc clif(B0Q412/F6H54w9,EGR1x1)[130/200],\par  (D7Z1,F8T918)x2[200], (D8Z2,C06C603)x2[197/200], (TP53x2)[197/200]\par \par Molecular Analyses:\par [facility] Advanced NGS Myeloid Report\par Specimen ID:  [id number]\par Date Collected:  10/31/2022\par Specimen Source:  Bone Marrow\par \par RESULT SUMMARY:  ABNORMAL\par DETECTED GENOMIC ALTERATIONS:\par Tier I: Variants of Strong Clinical Significance\par SF3B1 p.Urq099Phg\par Tier II: Variants of Potential Clinical Significance\par DNMT3A p.?\par Tier III: Variants of Unknown Clinical Significance\par KIT p.Hzv718Nga\par \par PERTINENT NEGATIVE RESULTS:\par The following genes are NEGATIVE for clinically relevant mutations.\par  Mutational hotspots and surrounding exonic regions were interrogated for\par  DNA level point mutations and indels (fusions not assayed).\par ABL1, ANKRD26, ASXL1, ATRX, BCOR, BCORL1, BRAF, CALR, CBL, CCND2, CDKN2A,\par  CEBPA, CSF3R, CUX1, DDX41, ETNK1, ETV6, EZH2, FBXW7, FLT3, GATA2, HRAS,\par  IDH1, IDH2, JAK2, KDM6A, KMT2A, KRAS, MAP2K1, MPL, MYD88, NF1, NPM1,\par  NRAS, PDGFRA, PHF6, PTEN, PTPN11, RUNX1, SETBP1, SRSF2, STAG2, TET2,\par  TP53, U2AF1, WT1, ZRSR2\par TECHNICAL SUMMARY :\par DNMT3A\par p.?\par c.2174-1G>A\par 4% allele frequency\par Exon 19\par [id number]\par \par SF3B1\par p.Beh868Voo\par c.1998G>C\par 39% allele frequency\par Exon 14\par [id number]\par \par \par KIT\par p.Ppw678Brr\par c.1879C>T\par 50% allele frequency\par Exon 12\par [id number]\par \par Clinical History/Data  :\par History of macrocytic anemia with CKD rule out primary bone marrow\par  disorder\par \par CBC, 10/31/2022\par \par Test Code       Result         Reference\par                  Range\par WBC             5.75 K/uL      3.80 - 10.50\par RBC             2.34 M/uL L    4.20 - 5.80\par HGB             8.3 g/dL L     13.0 - 17.0\par HCT             25.4 % L       39.0 - 50.0\par MCV             108.5 fl H     80.0 - 100.0\par MCH             35.5 pg H      27.0 - 34.0\par MCHC            32.7 g/dL      32.0 - 36.0\par RDW             18.5 % H       10.3 - 14.5\par PLT             236 K/uL       150 - 400\par Auto NRBC       0 /100 WBCs    0 - 0\par NEUT%           63.5 %         43.0 - 77.0\par \par NEUT#           3.65 K/uL      1.80 - 7.40\par LYMPH%          20.5 %         13.0 - 44.0\par LYMPH#          1.18 K/uL      1.00 - 3.30\par MONO%           7.0 %          2.0 - 14.0\par MONO#           0.40 K/uL      0.00 - 0.90\par EOS%            3.3 %          0.0 - 6.0\par EOS#            0.19 K/uL      0.00 - 0.50\par BASO%           1.4 %          0.0 - 2.0\par BASO#           0.08 K/uL      0.00 - 0.20\par BUN             28 mg/dL H     7 - 23\par Creatinine      2.32 mg/dL H   0.50 - 1.30\par \par Verified by: Brooklynn Erazo\par (Electronic Signature)\par Reported on: 11/09/22 16:40 EST, [facility], [address]  [facility]. [address], [place] [address]\par Phone: [phone]   Fax: [phone]\par \par 10/27/2022\par (Labs on 10/18/2022): Hgb stable at 9.0, normal WBC, platelets\par Creatinine 2.32\par eGFR 28\par \par \par 5/12/2022\par Available labs reviewed. \par Macrocytic anemia, no etiology could be determined. \par

## 2022-12-16 NOTE — ASSESSMENT
[FreeTextEntry1] : 81 year-old Mr. MACARIO is seen in follow up for macrocytic anemia (initial evaluation: HGB ~9.5, MCV ~125), WBC and platelet counts are preserved. Patient now has some symptoms of anemia. 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

## 2022-12-16 NOTE — HISTORY OF PRESENT ILLNESS
[de-identified] : CHART REVIEW: 81 year-old Mr. MACAIRO is seen in consult for macrocytic  anemia (Hgb 9.5, MCV ~125). Patient has multiple medical conditions including stage-3 CKD. He has no symptoms of anemia and has no complaints.  [de-identified] : 05/12/22:\par 81 year-old Mr. MACARIO is seen in consult for macrocytic  anemia (Hgb 9.5, MCV ~125). Patient has multiple medical conditions including stage-3 CKD. He has no symptoms of anemia and has no complaints. \par \par 10/27/22:\par Patient presenting today for follow up for macrocytic anemia. Overall feels well. Endorses some fatigue and shortness of breath with activity. Denies headache, dizziness, Ambulates with walker. He is being followed by his nephrologist who manages his hypertension. Endorses taking diuretics every other day.  \par \par 12/16/2022\par Patient returns for a follow up. He endorses no change in his health status. 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 Patient has some symptoms of fatigue and tiredness.

## 2023-01-19 ENCOUNTER — OUTPATIENT (OUTPATIENT)
Dept: OUTPATIENT SERVICES | Facility: HOSPITAL | Age: 83
LOS: 1 days | Discharge: HOME | End: 2023-01-19

## 2023-01-19 DIAGNOSIS — Z90.79 ACQUIRED ABSENCE OF OTHER GENITAL ORGAN(S): Chronic | ICD-10-CM

## 2023-01-19 DIAGNOSIS — Z98.890 OTHER SPECIFIED POSTPROCEDURAL STATES: Chronic | ICD-10-CM

## 2023-01-19 DIAGNOSIS — Z86.79 PERSONAL HISTORY OF OTHER DISEASES OF THE CIRCULATORY SYSTEM: Chronic | ICD-10-CM

## 2023-01-19 DIAGNOSIS — D64.9 ANEMIA, UNSPECIFIED: ICD-10-CM

## 2023-01-19 DIAGNOSIS — Z98.49 CATARACT EXTRACTION STATUS, UNSPECIFIED EYE: Chronic | ICD-10-CM

## 2023-01-26 ENCOUNTER — RESULT REVIEW (OUTPATIENT)
Age: 83
End: 2023-01-26

## 2023-01-26 ENCOUNTER — APPOINTMENT (OUTPATIENT)
Dept: HEMATOLOGY ONCOLOGY | Facility: CLINIC | Age: 83
End: 2023-01-26
Payer: MEDICARE

## 2023-01-26 ENCOUNTER — NON-APPOINTMENT (OUTPATIENT)
Age: 83
End: 2023-01-26

## 2023-01-26 ENCOUNTER — OUTPATIENT (OUTPATIENT)
Dept: OUTPATIENT SERVICES | Facility: HOSPITAL | Age: 83
LOS: 1 days | End: 2023-01-26
Payer: MEDICARE

## 2023-01-26 VITALS
HEART RATE: 105 BPM | RESPIRATION RATE: 20 BRPM | DIASTOLIC BLOOD PRESSURE: 71 MMHG | SYSTOLIC BLOOD PRESSURE: 127 MMHG | TEMPERATURE: 97 F | OXYGEN SATURATION: 93 % | BODY MASS INDEX: 29.89 KG/M2 | WEIGHT: 185.19 LBS

## 2023-01-26 DIAGNOSIS — Z86.79 PERSONAL HISTORY OF OTHER DISEASES OF THE CIRCULATORY SYSTEM: Chronic | ICD-10-CM

## 2023-01-26 DIAGNOSIS — K21.9 GASTRO-ESOPHAGEAL REFLUX DISEASE W/OUT ESOPHAGITIS: ICD-10-CM

## 2023-01-26 DIAGNOSIS — D46.9 MYELODYSPLASTIC SYNDROME, UNSPECIFIED: ICD-10-CM

## 2023-01-26 DIAGNOSIS — Z98.49 CATARACT EXTRACTION STATUS, UNSPECIFIED EYE: Chronic | ICD-10-CM

## 2023-01-26 DIAGNOSIS — Z98.890 OTHER SPECIFIED POSTPROCEDURAL STATES: Chronic | ICD-10-CM

## 2023-01-26 DIAGNOSIS — Z90.79 ACQUIRED ABSENCE OF OTHER GENITAL ORGAN(S): Chronic | ICD-10-CM

## 2023-01-26 LAB
BASOPHILS # BLD AUTO: 0.03 K/UL — SIGNIFICANT CHANGE UP (ref 0–0.2)
BASOPHILS NFR BLD AUTO: 1.1 % — SIGNIFICANT CHANGE UP (ref 0–2)
EOSINOPHIL # BLD AUTO: 0.09 K/UL — SIGNIFICANT CHANGE UP (ref 0–0.5)
EOSINOPHIL NFR BLD AUTO: 3.3 % — SIGNIFICANT CHANGE UP (ref 0–6)
HCT VFR BLD CALC: 20.8 % — CRITICAL LOW (ref 39–50)
HGB BLD-MCNC: 6.8 G/DL — CRITICAL LOW (ref 13–17)
IMM GRANULOCYTES NFR BLD AUTO: 1.5 % — HIGH (ref 0–0.9)
LYMPHOCYTES # BLD AUTO: 0.4 K/UL — LOW (ref 1–3.3)
LYMPHOCYTES # BLD AUTO: 14.5 % — SIGNIFICANT CHANGE UP (ref 13–44)
MCHC RBC-ENTMCNC: 32.7 G/DL — SIGNIFICANT CHANGE UP (ref 32–36)
MCHC RBC-ENTMCNC: 36 PG — HIGH (ref 27–34)
MCV RBC AUTO: 110.1 FL — HIGH (ref 80–100)
MONOCYTES # BLD AUTO: 0.24 K/UL — SIGNIFICANT CHANGE UP (ref 0–0.9)
MONOCYTES NFR BLD AUTO: 8.7 % — SIGNIFICANT CHANGE UP (ref 2–14)
NEUTROPHILS # BLD AUTO: 1.95 K/UL — SIGNIFICANT CHANGE UP (ref 1.8–7.4)
NEUTROPHILS NFR BLD AUTO: 70.9 % — SIGNIFICANT CHANGE UP (ref 43–77)
NRBC # BLD: 0 /100 WBCS — SIGNIFICANT CHANGE UP (ref 0–0)
PLATELET # BLD AUTO: 123 K/UL — LOW (ref 150–400)
RBC # BLD: 1.89 M/UL — LOW (ref 4.2–5.8)
RBC # FLD: 19.9 % — HIGH (ref 10.3–14.5)
WBC # BLD: 2.75 K/UL — LOW (ref 3.8–10.5)
WBC # FLD AUTO: 2.75 K/UL — LOW (ref 3.8–10.5)

## 2023-01-26 PROCEDURE — 86900 BLOOD TYPING SEROLOGIC ABO: CPT

## 2023-01-26 PROCEDURE — 86901 BLOOD TYPING SEROLOGIC RH(D): CPT

## 2023-01-26 PROCEDURE — 86923 COMPATIBILITY TEST ELECTRIC: CPT

## 2023-01-26 PROCEDURE — 99213 OFFICE O/P EST LOW 20 MIN: CPT

## 2023-01-26 PROCEDURE — 86850 RBC ANTIBODY SCREEN: CPT

## 2023-01-27 ENCOUNTER — NON-APPOINTMENT (OUTPATIENT)
Age: 83
End: 2023-01-27

## 2023-01-27 ENCOUNTER — INPATIENT (INPATIENT)
Facility: HOSPITAL | Age: 83
LOS: 10 days | Discharge: HOME HEALTH | End: 2023-02-07
Attending: HOSPITALIST | Admitting: HOSPITALIST
Payer: MEDICARE

## 2023-01-27 ENCOUNTER — APPOINTMENT (OUTPATIENT)
Dept: HEMATOLOGY ONCOLOGY | Facility: CLINIC | Age: 83
End: 2023-01-27
Payer: MEDICARE

## 2023-01-27 ENCOUNTER — APPOINTMENT (OUTPATIENT)
Dept: HEMATOLOGY ONCOLOGY | Facility: CLINIC | Age: 83
End: 2023-01-27

## 2023-01-27 ENCOUNTER — APPOINTMENT (OUTPATIENT)
Dept: INFUSION THERAPY | Facility: HOSPITAL | Age: 83
End: 2023-01-27

## 2023-01-27 VITALS
RESPIRATION RATE: 18 BRPM | OXYGEN SATURATION: 99 % | HEART RATE: 122 BPM | SYSTOLIC BLOOD PRESSURE: 105 MMHG | TEMPERATURE: 98 F | DIASTOLIC BLOOD PRESSURE: 65 MMHG

## 2023-01-27 DIAGNOSIS — D64.9 ANEMIA, UNSPECIFIED: ICD-10-CM

## 2023-01-27 DIAGNOSIS — Z86.79 PERSONAL HISTORY OF OTHER DISEASES OF THE CIRCULATORY SYSTEM: Chronic | ICD-10-CM

## 2023-01-27 DIAGNOSIS — Z98.49 CATARACT EXTRACTION STATUS, UNSPECIFIED EYE: Chronic | ICD-10-CM

## 2023-01-27 DIAGNOSIS — Z90.79 ACQUIRED ABSENCE OF OTHER GENITAL ORGAN(S): Chronic | ICD-10-CM

## 2023-01-27 DIAGNOSIS — Z98.890 OTHER SPECIFIED POSTPROCEDURAL STATES: Chronic | ICD-10-CM

## 2023-01-27 LAB
ALBUMIN SERPL ELPH-MCNC: 3.6 G/DL — SIGNIFICANT CHANGE UP (ref 3.3–5)
ALP SERPL-CCNC: 138 U/L — HIGH (ref 40–120)
ALT FLD-CCNC: 195 U/L — HIGH (ref 4–41)
ANION GAP SERPL CALC-SCNC: 15 MMOL/L — HIGH (ref 7–14)
ANISOCYTOSIS BLD QL: SIGNIFICANT CHANGE UP
APTT BLD: 19.5 SEC — LOW (ref 27–36.3)
AST SERPL-CCNC: 213 U/L — HIGH (ref 4–40)
BASOPHILS # BLD AUTO: 0 K/UL — SIGNIFICANT CHANGE UP (ref 0–0.2)
BASOPHILS NFR BLD AUTO: 0 % — SIGNIFICANT CHANGE UP (ref 0–2)
BILIRUB SERPL-MCNC: 1.5 MG/DL — HIGH (ref 0.2–1.2)
BUN SERPL-MCNC: 62 MG/DL — HIGH (ref 7–23)
CALCIUM SERPL-MCNC: 8.7 MG/DL — SIGNIFICANT CHANGE UP (ref 8.4–10.5)
CHLORIDE SERPL-SCNC: 101 MMOL/L — SIGNIFICANT CHANGE UP (ref 98–107)
CO2 SERPL-SCNC: 22 MMOL/L — SIGNIFICANT CHANGE UP (ref 22–31)
CREAT SERPL-MCNC: 3.22 MG/DL — HIGH (ref 0.5–1.3)
DACRYOCYTES BLD QL SMEAR: SLIGHT — SIGNIFICANT CHANGE UP
EGFR: 19 ML/MIN/1.73M2 — LOW
ELLIPTOCYTES BLD QL SMEAR: SLIGHT — SIGNIFICANT CHANGE UP
EOSINOPHIL # BLD AUTO: 0.02 K/UL — SIGNIFICANT CHANGE UP (ref 0–0.5)
EOSINOPHIL NFR BLD AUTO: 0.9 % — SIGNIFICANT CHANGE UP (ref 0–6)
FLUAV AG NPH QL: SIGNIFICANT CHANGE UP
FLUBV AG NPH QL: SIGNIFICANT CHANGE UP
GIANT PLATELETS BLD QL SMEAR: PRESENT — SIGNIFICANT CHANGE UP
GLUCOSE SERPL-MCNC: 131 MG/DL — HIGH (ref 70–99)
HCT VFR BLD CALC: 19.3 % — CRITICAL LOW (ref 39–50)
HCT VFR BLD CALC: 20.1 % — CRITICAL LOW (ref 39–50)
HGB BLD-MCNC: 6.3 G/DL — CRITICAL LOW (ref 13–17)
HGB BLD-MCNC: 6.5 G/DL — CRITICAL LOW (ref 13–17)
HYPOCHROMIA BLD QL: SLIGHT — SIGNIFICANT CHANGE UP
IANC: 1.38 K/UL — LOW (ref 1.8–7.4)
INR BLD: 1.5 RATIO — HIGH (ref 0.88–1.16)
LYMPHOCYTES # BLD AUTO: 0.27 K/UL — LOW (ref 1–3.3)
LYMPHOCYTES # BLD AUTO: 13.8 % — SIGNIFICANT CHANGE UP (ref 13–44)
MACROCYTES BLD QL: SIGNIFICANT CHANGE UP
MAGNESIUM SERPL-MCNC: 2.6 MG/DL — SIGNIFICANT CHANGE UP (ref 1.6–2.6)
MCHC RBC-ENTMCNC: 32.3 GM/DL — SIGNIFICANT CHANGE UP (ref 32–36)
MCHC RBC-ENTMCNC: 32.6 GM/DL — SIGNIFICANT CHANGE UP (ref 32–36)
MCHC RBC-ENTMCNC: 34.2 PG — HIGH (ref 27–34)
MCHC RBC-ENTMCNC: 36.2 PG — HIGH (ref 27–34)
MCV RBC AUTO: 105.8 FL — HIGH (ref 80–100)
MCV RBC AUTO: 110.9 FL — HIGH (ref 80–100)
MONOCYTES # BLD AUTO: 0.12 K/UL — SIGNIFICANT CHANGE UP (ref 0–0.9)
MONOCYTES NFR BLD AUTO: 6 % — SIGNIFICANT CHANGE UP (ref 2–14)
NEUTROPHILS # BLD AUTO: 1.54 K/UL — LOW (ref 1.8–7.4)
NEUTROPHILS NFR BLD AUTO: 71.5 % — SIGNIFICANT CHANGE UP (ref 43–77)
NEUTS BAND # BLD: 7.8 % — HIGH (ref 0–6)
NRBC # BLD: 0 /100 WBCS — SIGNIFICANT CHANGE UP (ref 0–0)
NRBC # FLD: 0 K/UL — SIGNIFICANT CHANGE UP (ref 0–0)
NT-PROBNP SERPL-SCNC: HIGH PG/ML
OB PNL STL: POSITIVE
OVALOCYTES BLD QL SMEAR: SIGNIFICANT CHANGE UP
PLAT MORPH BLD: NORMAL — SIGNIFICANT CHANGE UP
PLATELET # BLD AUTO: 137 K/UL — LOW (ref 150–400)
PLATELET # BLD AUTO: 143 K/UL — LOW (ref 150–400)
PLATELET COUNT - ESTIMATE: NORMAL — SIGNIFICANT CHANGE UP
POIKILOCYTOSIS BLD QL AUTO: SIGNIFICANT CHANGE UP
POLYCHROMASIA BLD QL SMEAR: SIGNIFICANT CHANGE UP
POTASSIUM SERPL-MCNC: 3.4 MMOL/L — LOW (ref 3.5–5.3)
POTASSIUM SERPL-SCNC: 3.4 MMOL/L — LOW (ref 3.5–5.3)
PROT SERPL-MCNC: 6.7 G/DL — SIGNIFICANT CHANGE UP (ref 6–8.3)
PROTHROM AB SERPL-ACNC: 17.5 SEC — HIGH (ref 10.5–13.4)
RBC # BLD: 1.74 M/UL — LOW (ref 4.2–5.8)
RBC # BLD: 1.9 M/UL — LOW (ref 4.2–5.8)
RBC # FLD: 20.6 % — HIGH (ref 10.3–14.5)
RBC # FLD: 24.3 % — HIGH (ref 10.3–14.5)
RBC BLD AUTO: ABNORMAL
RSV RNA NPH QL NAA+NON-PROBE: SIGNIFICANT CHANGE UP
SARS-COV-2 RNA SPEC QL NAA+PROBE: SIGNIFICANT CHANGE UP
SCHISTOCYTES BLD QL AUTO: SLIGHT — SIGNIFICANT CHANGE UP
SODIUM SERPL-SCNC: 138 MMOL/L — SIGNIFICANT CHANGE UP (ref 135–145)
TROPONIN T, HIGH SENSITIVITY RESULT: 411 NG/L — CRITICAL HIGH
TROPONIN T, HIGH SENSITIVITY RESULT: 416 NG/L — CRITICAL HIGH
TSH SERPL-MCNC: 1.65 UIU/ML — SIGNIFICANT CHANGE UP (ref 0.27–4.2)
WBC # BLD: 1.94 K/UL — LOW (ref 3.8–10.5)
WBC # BLD: 2.08 K/UL — LOW (ref 3.8–10.5)
WBC # FLD AUTO: 1.94 K/UL — LOW (ref 3.8–10.5)
WBC # FLD AUTO: 2.08 K/UL — LOW (ref 3.8–10.5)

## 2023-01-27 PROCEDURE — 99215 OFFICE O/P EST HI 40 MIN: CPT

## 2023-01-27 PROCEDURE — 99223 1ST HOSP IP/OBS HIGH 75: CPT

## 2023-01-27 PROCEDURE — 93010 ELECTROCARDIOGRAM REPORT: CPT

## 2023-01-27 PROCEDURE — 71045 X-RAY EXAM CHEST 1 VIEW: CPT | Mod: 26

## 2023-01-27 PROCEDURE — 99291 CRITICAL CARE FIRST HOUR: CPT

## 2023-01-27 RX ORDER — CEFEPIME 1 G/1
1000 INJECTION, POWDER, FOR SOLUTION INTRAMUSCULAR; INTRAVENOUS EVERY 24 HOURS
Refills: 0 | Status: DISCONTINUED | OUTPATIENT
Start: 2023-01-27 | End: 2023-01-28

## 2023-01-27 RX ORDER — CEFEPIME 1 G/1
INJECTION, POWDER, FOR SOLUTION INTRAMUSCULAR; INTRAVENOUS
Refills: 0 | Status: DISCONTINUED | OUTPATIENT
Start: 2023-01-27 | End: 2023-01-27

## 2023-01-27 RX ORDER — CALCITRIOL 0.5 UG/1
1 CAPSULE ORAL
Qty: 0 | Refills: 0 | DISCHARGE

## 2023-01-27 RX ADMIN — CEFEPIME 100 MILLIGRAM(S): 1 INJECTION, POWDER, FOR SOLUTION INTRAMUSCULAR; INTRAVENOUS at 22:57

## 2023-01-27 NOTE — ED ADULT NURSE REASSESSMENT NOTE - NS ED NURSE REASSESS COMMENT FT1
Sleeping, but able to be aroused with verbal commands. Patient to receive 2 units of PRBCs. No signs of distress, n/v or SOB. Respirations even and unlabored. Safety precautions in place. Will continue to follow current plan of care.

## 2023-01-27 NOTE — H&P ADULT - NSICDXPASTMEDICALHX_GEN_ALL_CORE_FT
PAST MEDICAL HISTORY:  Gout     HTN - Hypertension     Hypercholesterolemia     PC (prostate cancer) s/p surgical resection 3 years ago     PAST MEDICAL HISTORY:  Aneurysm, ascending aorta     Gout     HTN - Hypertension     Hypercholesterolemia     PC (prostate cancer) s/p surgical resection 3 years ago

## 2023-01-27 NOTE — ED PROVIDER NOTE - PROGRESS NOTE DETAILS
Yang Nelson, DO PGY-3: new troponinemia likely 2/2 demand from nemia, radha, however iso new ekg changes, cardiology consulted for r/o nstemi

## 2023-01-27 NOTE — H&P ADULT - ASSESSMENT
[  ]  Lab studies personally reviewed  [  ]  Radiology personally reviewed  [  ]  Old records personally reviewed    82 year old male, with past history significant for MDS, Stage II diastolic dysfunction, HTN, HLD, Prostate cancer - s/p Prostatectomy, Carotid artery stenosis - s/p Endarterectomy, PAD, Renal artery stenosis, and CKD, and presented to the ED, after being sent by outpatient rheumatologist, secondary to new onset atrial fibrillation.  Diagnosed with Anemia in the ED. [ x ]  Lab studies personally reviewed  [ x ]  Radiology personally reviewed  [ x ]  Old records personally reviewed    82 year old male, with past history significant for MDS, Stage II diastolic dysfunction, HTN, HLD, Prostate cancer - s/p Prostatectomy, Carotid artery stenosis - s/p Endarterectomy, PAD, Renal artery stenosis, and CKD, and presented to the ED, after being sent by outpatient rheumatologist, secondary to new onset atrial fibrillation.  Diagnosed with Anemia in the ED.

## 2023-01-27 NOTE — H&P ADULT - PROBLEM SELECTOR PLAN 8
- history of NAFLD  - has mild tenderness over the RUQ area  - T-bili = 1.5, ALP = 138, AST = 213, ALT = 195; elevations likely impacted by current medical state  - f/u abdominal ultrasound (ordered)  - trend liver function in the Am

## 2023-01-27 NOTE — ED PROVIDER NOTE - SEVERE SEPSIS ALERT DETAILS
Dr. Harris: Noted elevated HR and LFTs which are more likely related to the anemia from MDS than an infectious etiology.

## 2023-01-27 NOTE — H&P ADULT - NSHPREVIEWOFSYSTEMS_GEN_ALL_CORE
REVIEW OF SYSTEMS:    CONSTITUTIONAL: Generalized weakness and fatigue.  Loss of appetite over the past month.  No fever, chills or sweating  EYES/ENT: No visual changes.  No dysphagia  NECK: No pain or stiffness  RESPIRATORY: No cough or hemoptysis.  No shortness of breath  CARDIOVASCULAR: No chest pain or palpitations.  (+) lower extremity edema  GASTROINTESTINAL:  Pain of the right abdomen over the recent past.  No nausea, vomiting or hematemesis.  Erratic bowel movement; constipated frequently and w/ diarrhea after taking stool softener.  No melena or hematochezia.  GENITOURINARY: No dysuria, frequency or hematuria  MUSCULOSKELETAL: Severe pain of the lower back related to herniated discs, spinal stenosis.  Reports sciatica.  (+) edema of the feet.  Decreased ROM of the back and lower extremities due to pain  NEUROLOGICAL: No numbness or weakness  PSYCHIATRY: Reports anger related to his medical condition.  No anxiety, or depression.  SKIN: No itching, burning, rashes, or lesions   All other review of systems is negative unless indicated above.

## 2023-01-27 NOTE — H&P ADULT - NSHPSOCIALHISTORY_GEN_ALL_CORE
SOCIAL HISTORY:    Marital Status:  (  )    (  ) Single        (  )        (  )        (  )   Lives with:         (  ) Alone       (  ) Spouse     (  ) Children         (  ) Parents              (  ) Other    No history of smoking  No history of alcohol abuse  No history of illegal drug use    Occupation: SOCIAL HISTORY:    Marital Status:  ( x )    (  ) Single        (  )        (  )        (  )   Lives with:         (  ) Alone       (  ) Spouse     (  ) Children         (  ) Parents              (  ) Other    History of smoking; stopped at age 26 years after smoking ~ 1ppd since age 14 years  No history of alcohol abuse  No history of illegal drug use    Occupation:

## 2023-01-27 NOTE — H&P ADULT - PROBLEM SELECTOR PLAN 3
- troponin = 416 --> 411.  Pro-BNP = 38244  - ECG = Sinus rhythm w/ occasional PVCs and PACs at 99 bpm, QTc = 503  - [id number] score = 5  - sent in for evaluation/management of same of new onset a-fib  - A-fib likely of multifactorial etiology; infection, dehydration, anemia.  Unlikely CHF (pro-BNP unlikely of cardiac origin)  - receiving antibiotic, being hydrated and being transfused p-RBCs  - has ~ II/VI systolic murmur (pronounced over aortic and mitral valve areas)  - being followed by Cardiology team (appreciated)  - continuing with reduced dose of carvedilol (from 25 mg to 6.25 mg), (reduced due to borderline low blood pressure)  - continuing w/ statin  - aspirin and Plavix on hold presently (pls d/w Cardiology team re restarting same)  - f/u TTE (ordered)

## 2023-01-27 NOTE — H&P ADULT - PROBLEM SELECTOR PLAN 6
- corrected QTc = 503  - may be due to medication side effect versus electrolyte imbalance  - ECG as noted above  - f/u electrolytes; mildly hypokalemic.  Giving 20 meq KCl (in the setting of acute on CKD)  - magnesium = 2.6  - f/u repeat ECG in the AM (ordered)

## 2023-01-27 NOTE — H&P ADULT - PROBLEM SELECTOR PLAN 5
- pro-BNP = 76913.  Troponin = 416 --> 411  - ECG = Sinus rhythm w/ occasional PVCs and PACs at 99 bpm, QTc = 503  - TTE of 10/2022 w/ Stage II diastolic dysfunction  - has pedal edema, but elevated pro-BNP unlikely of cardiac origin.  Edema more-so due to anasarca or, possibly amlodipine (held for now)  - intake/output Q6H, Weight daily  - holding off on fluid restriction presently; please f/u for initiation  - being followed by Cardiology team (appreciated)  - continues on cardioprotective meds as above

## 2023-01-27 NOTE — ED ADULT NURSE NOTE - OBJECTIVE STATEMENT
Lethargic but can be aroused with verbal commands. A&Ox4. Respirations even and unlabored. Patient was scheduled  for outpatient procedure to receive blood transfusion. On assessment they noticed that his heart rate was abnormal and sent him to the ED. Patient states feeling tired and pain in the LLQ of  the abdomen, on inspection patients abdomen appears to be distended, but soft. Skin yellow and pale. Redness noticed on shins and ankles swollen on inspection. Patient denies SOB, dizziness, or n/v. 20 gauge IV placed in left ac. Safety precautions in place. Labs drawn as per current care plan.

## 2023-01-27 NOTE — H&P ADULT - NSHPPHYSICALEXAM_GEN_ALL_CORE
Vital Signs Last 24 Hrs  T(C): 37.6 (27 Jan 2023 20:04), Max: 37.7 (27 Jan 2023 10:20)  T(F): 99.6 (27 Jan 2023 20:04), Max: 99.9 (27 Jan 2023 10:20)  HR: 94 (27 Jan 2023 20:04) (66 - 127)  BP: 124/84 (27 Jan 2023 20:04) (100/57 - 124/84)  BP(mean): --  RR: 18 (27 Jan 2023 20:04) (18 - 20)  SpO2: 96% (27 Jan 2023 20:04) (90% - 99%)    Parameters below as of 27 Jan 2023 20:04  Patient On (Oxygen Delivery Method): nasal cannula  O2 Flow (L/min): 2    ========================================================================================== Vital Signs Last 24 Hrs  T(C): 37.6 (27 Jan 2023 20:04), Max: 37.7 (27 Jan 2023 10:20)  T(F): 99.6 (27 Jan 2023 20:04), Max: 99.9 (27 Jan 2023 10:20)  HR: 94 (27 Jan 2023 20:04) (66 - 127)  BP: 124/84 (27 Jan 2023 20:04) (100/57 - 124/84)  BP(mean): --  RR: 18 (27 Jan 2023 20:04) (18 - 20)  SpO2: 96% (27 Jan 2023 20:04) (90% - 99%)    Parameters below as of 27 Jan 2023 20:04  Patient On (Oxygen Delivery Method): nasal cannula  O2 Flow (L/min): 2    ==========================================================================================    PHYSICAL EXAMINATION:    APPEARANCE: Adequately groomed male, ill appearing, lying supine in bed in guarded position; appears to experience significant painful discomfort of the lower back when trying to reposition in bed  HEENT: Mildly dry oral mucosa.  Pupils reactive to light.  EOMI  LYMPHATIC: No lymphadenopathy appreciated  CARDIOVASCULAR: (+) S1 S2.  (+) murmur - especially over the aortic and mitral valve areas (~ II/VI).  (+) edema of the pedal areas  RESPIRATORY: No wheezing, rhonchi, crackles appreciated  GASTROINTESTINAL: Soft.  Non-tender.  (+) BS  GENITOURINARY: No suprapubic tenderness.  No CVA tenderness B/L  EXTREMITIES: Normal range of motion.  No clubbing, cyanosis or edema  MUSCULOSKELETAL: Some generalized muscle wasting appreciated.  Decreased ROM of the lower back due to pain.  No asymmetry  SKIN: No rashes. No ecchymoses.  No cyanosis  PSYCHIATRIC: A&O x 3.  Mood & affect appropriate to situation  NEUROLOGICAL: Non-focal, RICHARDSON x 4 against gravity  VASCULAR: Peripheral pulses palpable

## 2023-01-27 NOTE — ASSESSMENT
[FreeTextEntry1] : 81 year-old Mr. MACARIO is seen in follow up for macrocytic anemia (initial evaluation: HGB ~9.5, MCV ~125), WBC and platelet counts are preserved. Patient now has some symptoms of anemia. 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 Will schedule PRBC transfusion. Will also give trial of Luspatercept (Reblozyl) \par \par

## 2023-01-27 NOTE — ED ADULT NURSE NOTE - NSIMPLEMENTINTERV_GEN_ALL_ED
Implemented All Universal Safety Interventions:  [place] to call system. Call bell, personal items and telephone within reach. Instruct patient to call for assistance. Room bathroom lighting operational. Non-slip footwear when patient is off stretcher. Physically safe environment: no spills, clutter or unnecessary equipment. Stretcher in lowest position, wheels locked, appropriate side rails in place.

## 2023-01-27 NOTE — H&P ADULT - SOCIAL HISTORY
-Keep dressings clean and dry. Change dressings once between wound clinic visits, and if they become over saturated, soiled or fall off.     -Avoid prolonged sitting.    -Should you experience any significant changes in your wound(s), such as signs of infection (increasing redness, swelling, localized heat, increased pain, fever > 101 F, chills) or have any questions regarding your home care instructions, please contact the wound center at [phone]. If after hours, contact your primary care physician or go to the hospital emergency room.     -If you are 5 or more minutes late for an appointment, we reserve the right to cancel and reschedule that appointment. Additionally, if you are habitually late or not showing (3 late cancellations and/or no shows), we reserve the right to cancel your remaining appointments and it will be your responsibility to obtain a new referral if services are still needed.         Yes

## 2023-01-27 NOTE — ED PROVIDER NOTE - OBJECTIVE STATEMENT
Gina DOBSON PGY-3:  82-year-old male past medical history of prostate cancer status post prostatectomy, hypertension, hyperlipidemia, PAD, renal artery stenosis, CKD and currently being treated by rheumatology for MDS presents from rheumatology office secondary to new atrial fibrillation.  Patient was scheduled to receive a blood transfusion today.  Patient denies any lightheadedness, chest pain, shortness of breath.  Patient denies melanotic stools.  Patient is on aspirin Plavix.

## 2023-01-27 NOTE — ED PROVIDER NOTE - CLINICAL SUMMARY MEDICAL DECISION MAKING FREE TEXT BOX
Gina DO PGY-3:  82-year-old male past medical history of prostate cancer status post prostatectomy, hypertension, hyperlipidemia, PAD, renal artery stenosis, CKD and currently being treated by rheumatology for MDS presents from rheumatology office secondary to new atrial fibrillation.  Patient was scheduled to receive a blood transfusion today.  Patient denies any lightheadedness, chest pain, shortness of breath.  Patient denies melanotic stools.  Patient is on aspirin Plavix.  Will check h/h again along w/ electrotype. Possible that level of anemia maybe contributing to new afib. Will obtain cardiac labs. Probable transfusion in the ED. Will reassess. Dispo pending workup.

## 2023-01-27 NOTE — ED PROVIDER NOTE - PHYSICAL EXAMINATION
CONSTITUTIONAL: Well-developed; well-nourished; in no acute distress.   SKIN: warm, dry, +pale   HEAD: Normocephalic; atraumatic.  EYES: no conjunctival injection. PERRL.   ENT: No nasal discharge; airway clear.  NECK: Supple; non tender.  CARD: S1, S2 normal; Regular rate and rhythm.   RESP: No wheezes, rales or rhonchi. Good air movement bilaterally.   ABD: soft ntnd, no guarding, no distention, no rigidity.   EXT: No cyanosis or edema.   NEURO: AOx3  PSYCH: Cooperative, appropriate. CONSTITUTIONAL: Well-developed; well-nourished; in no acute distress.   SKIN: warm, dry, +pale   HEAD: Normocephalic; atraumatic.  EYES: no conjunctival injection. PERRL.   ENT: No nasal discharge; airway clear.  NECK: Supple; non tender.  CARD: S1, S2 normal; Regular rate  RESP: No wheezes, rales or rhonchi. Good air movement bilaterally.   ABD: soft ntnd, no guarding, no distention, no rigidity.   EXT: No cyanosis or edema.   NEURO: AOx3  PSYCH: Cooperative, appropriate.

## 2023-01-27 NOTE — PROGRESS NOTE ADULT - SUBJECTIVE AND OBJECTIVE BOX
Patient seen and evaluated at bedside    Chief Complaint:    HPI:  81 yo M w/ PMH HTN, CKD, renal artery stenosis s/p stent, CAD (LAD 60%, OM2 60%, RCA ), PAD, MDS who presents with anemia and abnormal EKG. Patient was at his heme/onc office for blood transfusion and was found to have Afib on EKG. On admission to ED, EKG showed sinus rhythm with multiple PACs/atrial ectopy. Patient denies any chest pain, SOB, lightheadedness, dizziness, abd pain, LE edema, orthopnea.     In the ED, HR 120s although on tele, had multiple atrial ectopy and PVCs, rest of vitals stable.     PMHx:   HTN - Hypertension    Hypercholesterolemia    PC (prostate cancer)    Gout        PSHx:   H/O prostatectomy    H/O carotid endarterectomy    H/O renal artery stenosis    Status post cataract extraction, unspecified laterality        Allergies:  No Known Allergies      Home Meds:    Current Medications:       FAMILY HISTORY:  No pertinent family history in first degree relatives        Social History:  Smoking History:  Alcohol Use:  Drug Use:    REVIEW OF SYSTEMS:  As above       Physical Exam:  T(F): 98 (01-27), Max: 99.9 (01-27)  HR: 120 (01-27) (120 - 127)  BP: 100/57 (01-27) (100/57 - 112/70)  RR: 18 (01-27)  SpO2: 94% (01-27)  GENERAL: No acute distress   ENT: No JVD   CHEST/LUNG: Clear to auscultation bilaterally; No wheeze, equal breath sounds bilaterally   HEART: Regular rate and rhythm; No murmurs, rubs, or gallops  EXTREMITIES:  No clubbing, cyanosis, or edema  PSYCH: Nl behavior, nl affect  NEUROLOGY: AAOx3, non-focal   SKIN: Normal color, No rashes or lesions  LINES:    Cardiovascular Diagnostic Testing:    ECG: Personally reviewed:    Echo:   Echo in 8/2022 outpatient records showed normal EF, mild AS     Stress Testing:     Cath:    Imaging:    CXR:     Labs: Personally reviewed                        6.3    1.94  )-----------( 143      ( 27 Jan 2023 14:00 )             19.3     01-27    138  |  101  |  62<H>  ----------------------------<  131<H>  3.4<L>   |  22  |  3.22<H>    Ca    8.7      27 Jan 2023 14:00  Mg     2.60     01-27    TPro  6.7  /  Alb  3.6  /  TBili  1.5<H>  /  DBili  x   /  AST  213<H>  /  ALT  195<H>  /  AlkPhos  138<H>  01-27    PT/INR - ( 27 Jan 2023 14:00 )   PT: 17.5 sec;   INR: 1.50 ratio         PTT - ( 27 Jan 2023 14:00 )  PTT:19.5 sec  Serum Pro-Brain Natriuretic Peptide: 20464 pg/mL (01-27 @ 14:00)        Thyroid Stimulating Hormone, Serum: 1.65 uIU/mL (01-27 @ 14:00)

## 2023-01-27 NOTE — H&P ADULT - PROBLEM SELECTOR PLAN 7
- lack of appetite for the past month - after starting med - Lenalidomide  - consequently w/ decreased oral intake, leading to dehydration  - also complicated by periodic diarrheal episodes  - evaluate for IVF hydration after transfusion completed  - on clear liquid diet  - f/u renal function, electrolytes  - Nephrology consult in the AM (please call)

## 2023-01-27 NOTE — CONSULT NOTE ADULT - ASSESSMENT
83 yo M w/ PMH HTN, CKD, renal artery stenosis s/p stent, CAD (LAD 60%, OM2 60%, RCA ), PAD, MDS who presents with anemia and abnormal EKG. Cardiology called for NSTEMI.     #NSTEMI   Most likely type II given hx of CAD, cath in 2021 2/2 anemia. No anginal pain, EKG does show STD in I and II along with V5-6 which is new for patient. Very low suspicion for type I NSTEMI.   -Transfuse PRBC for goal >8   -Recommend echo   -c/w patient's aspirin, plavix   -c/w patient's atorvastatin and carvedilol     #Atrial Ectopy   -Tele   -c/w patient's carvedilol  81 yo M w/ PMH HTN, CKD, renal artery stenosis s/p stent, CAD (LAD 60%, OM2 60%, RCA ), PAD, MDS who presents with anemia and abnormal EKG. Cardiology called for NSTEMI.     #NSTEMI   Most likely type II given hx of CAD 2/2 anemia, cath in 2021. No anginal pain, EKG does show STD in I and II along with V5-6 which is new for patient. Very low suspicion for type I NSTEMI or PE.   -Transfuse PRBC for goal Hgb > 8   -Recommend echo   -c/w patient's aspirin, plavix   -c/w patient's atorvastatin and carvedilol   -Can trend troponin to peak     #Atrial Ectopy   Asymptomatic, likely in setting of anemia   -Tele   -c/w patient's carvedilol

## 2023-01-27 NOTE — ED ADULT TRIAGE NOTE - CHIEF COMPLAINT QUOTE
Patient brought to ER from MD office by EMS for blood transfusion but was noted to have new onset afib,

## 2023-01-27 NOTE — CONSULT NOTE ADULT - SUBJECTIVE AND OBJECTIVE BOX
HPI: Mr. Moya is an 82 year-old man with history of multiple medical issues including hypertension, gout, prostate cancer, renal artery stenosis, and stage 4 chronic kidney disease. He is well-known to me. He was noted as outpatient by his PCP Dr. Maksim Reddy to be anemia with Hb of <7g/dL; therefore he was sent to the ER for transfusion. He was noted in the ER to be in atrial fibrillation    PAST MEDICAL & SURGICAL HISTORY:  HTN - Hypertension  Hypercholesterolemia  PC (prostate cancer) -s/p surgical resection 3 years ago - prostatectomy  Gout  CKD4  H/O carotid endarterectomy  H/O renal artery stenosis - s/p stent in Left RA  Status post cataract extraction, unspecified laterality    Allergies  No Known Allergies    SOCIAL HISTORY:  Denies ETOh,Smoking,     FAMILY HISTORY:  No CKD    REVIEW OF SYSTEMS:  CONSTITUTIONAL: No weakness, fevers or chills  EYES/ENT: No visual changes;  No vertigo or throat pain   NECK: No pain or stiffness  RESPIRATORY: No cough, wheezing, hemoptysis; No shortness of breath  CARDIOVASCULAR: No chest pain or palpitations  GASTROINTESTINAL: No abdominal or epigastric pain. No nausea, vomiting, or hematemesis; No diarrhea or constipation. No melena or hematochezia.  GENITOURINARY: No dysuria, frequency or hematuria  NEUROLOGICAL: No numbness or weakness  SKIN: No itching, burning, rashes, or lesions   All other review of systems is negative unless indicated above.    VITAL:  T(C): , Max: 37.7 (01-27-23 @ 10:20)  T(F): , Max: 99.9 (01-27-23 @ 10:20)  HR: 122 (01-27-23 @ 12:40)  BP: 105/65 (01-27-23 @ 12:40)  RR: 18 (01-27-23 @ 12:40)  SpO2: 99% (01-27-23 @ 12:40)    PHYSICAL EXAM:  Constitutional: NAD, Alert  HEENT: NCAT, MMM  Neck: Supple, No JVD  Respiratory: CTA-b/l  Cardiovascular: RRR s1s2, no m/r/g  Gastrointestinal: BS+, soft, NT/ND  Extremities: No peripheral edema b/l  Neurological: no focal deficits; strength grossly intact  Back: no CVAT b/l  Skin: No rashes, no nevi    LABS:                        6.8    2.75  )-----------( 123      ( 26 Jan 2023 13:31 )             20.8       (12/16/22) - BUN 28, Cr 2.23, K 4.7, HCO3 26, Ca 9.4, Alb 4.9, Hb 9.1  (8/24/22) - A1c 5.4; UA-trace blood, 30prot, large LE, nit (+)  (5/25/22) - (+)faint band on SPEP  (5/12/22) - BUN38, Cr 2.35, K 4.6, HCO3 23, U 9.2, Ferr 118, TSat 39, Hb 10.6  (7/21/21) - BUN 34, Cr 2.29, K 4.2, HCO3 23, Ca 8.9, Alb 4.8, U 9.1, PTH 53, Uprot 5.3mg/dL  (3/22/21) - BUN 33, Cr 1.98, K 4.2, HCO3 23  (9/14/20) - BUN 37, Cr 2.35, K 4.9, HCO3 23, Hb 9.8, TSat 55, Ferr 279, Uric 7.6, PO4 4.0, PTH 30  (8/29/20) - BUN 84, Cr 5.04, K 3.7, HCO3 21; Urine: Na 50, K 16, Cl <35, Cr 77, UA-100prot,mod blood      IMAGING:  < from: US Kidney and Bladder (12.28.21 @ 20:33) >  Right kidney: 7.3 cm. Atrophic. No hydronephrosis. Increased cortical   echogenicity. Punctate nonobstructing renal calculus.  Left kidney: 11.1 cm. No hydronephrosis. Increased cortical echogenicity.   Upper pole cyst measuring 2.5 x 2.7 x 2.2 cm.  Urinary bladder: Within normal limits.    (10/3/22) - TTE - mild AS, mild MR, EF 65%, mod diast dysfunction  (3/18/21) - cardiac cath ==>medical management  (2/2/21) - NST - medium mod-severe defects - partially reversible  (8/31/20) - RAD - L 12.1, R 5.7; Left renal artery origin with severe/hemodynamically significant stenosis      ASSESSMENT:  (1)CKD - stage 4, with GFR 20-30ml/min - in setting of atrophic right kidney, from [facility].   (2)Anemia - planned for PRBCs today  (3)EP - rapid afib    RECOMMEND:      Thank you for involving [facility] in this patient's care.    With warm regards,    Edis Cabral MD   [facility]  Office: [phone]  Cell: [phone]             HPI: Mr. Moya is an 82 year-old man with history of multiple medical issues including hypertension, gout, prostate cancer, renal artery stenosis, and stage 4 chronic kidney disease. He is well-known to me. He was noted as outpatient by his PCP Dr. Maksim Reddy to be anemia with Hb of <7g/dL; therefore he was sent to the ER for transfusion. He was noted in the ER to be in atrial fibrillation. Labwork from the ER is pending.    Mr. Moya shares that he was in his usual state of health until 1 month ago when he was prescribed Lenalidomide by his hematologist. Since then, his energy and appetite has waned. He has hardly eaten at all of late. He attests to intermittent nausea; he denies vomiting. He attests to good urine output. He complains of pins/needles pain of feet.        PAST MEDICAL & SURGICAL HISTORY:  HTN - Hypertension  Hypercholesterolemia  PC (prostate cancer) -s/p surgical resection 3 years ago - prostatectomy  Gout  CKD4  H/O carotid endarterectomy  H/O renal artery stenosis - s/p stent in Left RA  Status post cataract extraction, unspecified laterality    Allergies  No Known Allergies    SOCIAL HISTORY:  Denies ETOh,Smoking,     FAMILY HISTORY:  No CKD    REVIEW OF SYSTEMS:  CONSTITUTIONAL: (+)weakness, (+)fatigue, (+)loss of appetite; no fever  EYES/ENT: No visual changes;  No vertigo or throat pain   NECK: No pain or stiffness  RESPIRATORY: No cough, wheezing, hemoptysis; No shortness of breath  CARDIOVASCULAR: No chest pain or palpitations  GASTROINTESTINAL: No abdominal or epigastric pain. No nausea, vomiting, or hematemesis; No diarrhea or constipation. No melena or hematochezia.  GENITOURINARY: No dysuria, frequency or hematuria  NEUROLOGICAL: (+)pins/needles pain of feet  SKIN: No itching, burning, rashes, or lesions   All other review of systems is negative unless indicated above.    VITAL:  T(C): , Max: 37.7 (01-27-23 @ 10:20)  T(F): , Max: 99.9 (01-27-23 @ 10:20)  HR: 122 (01-27-23 @ 12:40)  BP: 105/65 (01-27-23 @ 12:40)  RR: 18 (01-27-23 @ 12:40)  SpO2: 99% (01-27-23 @ 12:40)    PHYSICAL EXAM:  Constitutional: NAD, Alert  HEENT: NCAT, DMM  Neck: Supple, No JVD  Respiratory: CTA-b/l  Cardiovascular: irreg tachy s1s2  Gastrointestinal: BS+, soft, NT/ND  Extremities: 1+ b/l LE edema  Neurological: no focal deficits; strength grossly intact  Back: no CVAT b/l  Skin: (+)pallor    LABS:                        6.8    2.75  )-----------( 123      ( 26 Jan 2023 13:31 )             20.8       (12/16/22) - BUN 28, Cr 2.23, K 4.7, HCO3 26, Ca 9.4, Alb 4.9, Hb 9.1  (8/24/22) - A1c 5.4; UA-trace blood, 30prot, large LE, nit (+)  (5/25/22) - (+)faint band on SPEP  (5/12/22) - BUN38, Cr 2.35, K 4.6, HCO3 23, U 9.2, Ferr 118, TSat 39, Hb 10.6  (7/21/21) - BUN 34, Cr 2.29, K 4.2, HCO3 23, Ca 8.9, Alb 4.8, U 9.1, PTH 53, Uprot 5.3mg/dL  (3/22/21) - BUN 33, Cr 1.98, K 4.2, HCO3 23  (9/14/20) - BUN 37, Cr 2.35, K 4.9, HCO3 23, Hb 9.8, TSat 55, Ferr 279, Uric 7.6, PO4 4.0, PTH 30  (8/29/20) - BUN 84, Cr 5.04, K 3.7, HCO3 21; Urine: Na 50, K 16, Cl <35, Cr 77, UA-100prot,mod blood      IMAGING:  < from: US Kidney and Bladder (12.28.21 @ 20:33) >  Right kidney: 7.3 cm. Atrophic. No hydronephrosis. Increased cortical   echogenicity. Punctate nonobstructing renal calculus.  Left kidney: 11.1 cm. No hydronephrosis. Increased cortical echogenicity.   Upper pole cyst measuring 2.5 x 2.7 x 2.2 cm.  Urinary bladder: Within normal limits.    (10/3/22) - TTE - mild AS, mild MR, EF 65%, mod diast dysfunction  (3/18/21) - cardiac cath ==>medical management  (2/2/21) - NST - medium mod-severe defects - partially reversible  (8/31/20) - RAD - L 12.1, R 5.7; Left renal artery origin with severe/hemodynamically significant stenosis      ASSESSMENT:  (1)CKD - stage 4, with GFR 20-30ml/min - in setting of atrophic right kidney, from [facility].   (2)JUAN RAMON - is he in JUAN RAMON/is he uremic? He appears quiet ill.  (3)Anemia - planned for PRBCs today  (4)EP - rapid afib  (5)CV - dry    RECOMMEND:  (1)F/U pending metabolic parameters  (2)IVF; Is>Os  (3)PRBCs   (4)D/C Lenalidomide  (5)Meds for GFR <15  (6)Admit    will follow      Thank you for involving [facility] in this patient's care.    With warm regards,    Edis Cabral MD   [facility]  Office: [phone]  Cell: [phone]

## 2023-01-27 NOTE — H&P ADULT - PROBLEM SELECTOR PLAN 2
- low grade temp to 37.7 C (99.9 F).  Bands = 7.8  - IANC = 1.38  - started on cefepime 1 gram Q24H (modified dose due to CKD)  - f/u blood cultures  - ensure optimal hydration  - f/u electrolytes  - ID consult in the AM, please call - low grade temp to 37.7 C (99.9 F).  Bands = 7.8.  IANC = 1.38  - CXR = "Lower lobe predominant B/L opacities, right greater than left. Interstitial prominence..." (personally reviewed)  - started on cefepime 1 gram Q24H (modified dose due to CKD)  - f/u blood cultures  - f/u UA (ordered)  - ensure optimal hydration  - f/u electrolytes  - ID consult in the AM, please call

## 2023-01-27 NOTE — H&P ADULT - HISTORY OF PRESENT ILLNESS
82 year old male, with past history significant for MDS, Stage II diastolic dysfunction, HTN, HLD, Prostate cancer - s/p Prostatectomy, Carotid artery stenosis - s/p Endarterectomy, PAD, Renal artery stenosis, and CKD, and presented to the ED, after being sent by outpatient rheumatologist, secondary to new onset atrial fibrillation.  Seen and evaluated at bedside;    Vital signs upon ED presentation as follows: BP = 105/65, HR = 122, RR = 18, T = 36.7 C (98 F), O2 Sat = 99% on RA.  Diagnosed with Anemia and prescribed 2 units p-RBCs in the ED. 82 year old male, with past history significant for MDS, Stage II diastolic dysfunction, HTN, HLD, Prostate cancer - s/p Prostatectomy, Carotid artery stenosis - s/p Endarterectomy, PAD, Renal artery stenosis, and CKD, and presented to the ED, after being sent by outpatient rheumatologist, secondary to new onset atrial fibrillation.  Seen and evaluated at bedside; ill appearing, but in NAD.  Confirms being sent in by outpatient provider due to dysrhythmia, after presenting to undergo blood transfusion.  Patient reports complete loss of appetite and insomnia since taking Lenalidomide, which was prescribed ~ one month ago.  Has not noted any signs of blood in the urine or stools.  No hematemesis, hemoptysis or epistaxis.  States no chest pain, palpitations, shortness of breath, headache, dizziness or lightheadedness.  Complains of significant fatigue.  Has had some right sided abdominal pain.  Complains of erratic bowel movements; constipated at times for 2 to 3 days, then has diarrhea after taking stool softener.  Last bowel movement was approximately 2 days ago.  Reports severe lower back pain with difficulty ambulating due to pain of the legs, which is related to spinal stenosis, herniated discs and sciatica.    Vital signs upon ED presentation as follows: BP = 105/65, HR = 122, RR = 18, T = 36.7 C (98 F), O2 Sat = 99% on RA.  Diagnosed with Anemia and prescribed 2 units p-RBCs in the ED.

## 2023-01-27 NOTE — H&P ADULT - NSHPLABSRESULTS_GEN_ALL_CORE
LAB-WORK/STUDIES:                          6.3    1.94  )-----------( 143      ( 27 Jan 2023 14:00 )             19.3     27 Jan 2023 14:00    138    |  101    |  62     ----------------------------<  131    3.4     |  22     |  3.22     Ca    8.7        27 Jan 2023 14:00  Mg     2.60      27 Jan 2023 14:00    TPro  6.7    /  Alb  3.6    /  TBili  1.5    /  DBili  x      /  AST  213    /  ALT  195    /  AlkPhos  138    27 Jan 2023 14:00    LIVER FUNCTIONS - ( 27 Jan 2023 14:00 )  Alb: 3.6 g/dL / Pro: 6.7 g/dL / ALK PHOS: 138 U/L / ALT: 195 U/L / AST: 213 U/L / GGT: x           PT/INR - ( 27 Jan 2023 14:00 )   PT: 17.5 sec;   INR: 1.50 ratio         PTT - ( 27 Jan 2023 14:00 )  PTT:19.5 sec  CAPILLARY BLOOD GLUCOSE    =========================================================================        ========================================================================= LAB-WORK/STUDIES:                          6.3    1.94  )-----------( 143      ( 27 Jan 2023 14:00 )             19.3     27 Jan 2023 14:00    138    |  101    |  62     ----------------------------<  131    3.4     |  22     |  3.22     Ca    8.7        27 Jan 2023 14:00  Mg     2.60      27 Jan 2023 14:00    TPro  6.7    /  Alb  3.6    /  TBili  1.5    /  DBili  x      /  AST  213    /  ALT  195    /  AlkPhos  138    27 Jan 2023 14:00    LIVER FUNCTIONS - ( 27 Jan 2023 14:00 )  Alb: 3.6 g/dL / Pro: 6.7 g/dL / ALK PHOS: 138 U/L / ALT: 195 U/L / AST: 213 U/L / GGT: x           PT/INR - ( 27 Jan 2023 14:00 )   PT: 17.5 sec;   INR: 1.50 ratio         PTT - ( 27 Jan 2023 14:00 )  PTT:19.5 sec  CAPILLARY BLOOD GLUCOSE    =========================================================================    ECG = Sinus rhythm w/ occasional PVCs and PACs at 99 bpm, QTc = 503    =========================================================================

## 2023-01-27 NOTE — H&P ADULT - PROBLEM SELECTOR PLAN 1
- macrocytic.  Hgb = 6.3, MCV = 110.9, in the context of MDS  - presented for blood transfusion as outpatient and sent in due to new onset A-fib  - FOBT positive  - initially tachycardic to 122 in the ED  - being transfused 2 units of p-RBCs  - anemia is likely of multifactorial etiology; chronic disease, blood loss, dietary deficiency  - f/u anemia profile (added to initial lab-work)  - f/u PT-CBC  - limited doses of PPI prescribed (please f/u platelet trend; currently 143)  - folic acid started  - Hem/Onc consult in the AM (please call)  - GI consult in the AM (will e-mail)

## 2023-01-27 NOTE — CONSULT NOTE ADULT - SUBJECTIVE AND OBJECTIVE BOX
Patient seen and evaluated at bedside    Chief Complaint:    HPI:  83 yo M w/ PMH HTN, CKD, renal artery stenosis s/p stent, CAD (LAD 60%, OM2 60%, RCA ), PAD, MDS who presents with anemia and abnormal EKG. Patient was at his heme/onc office for blood transfusion and was found to have Afib on EKG. On admission to ED, EKG showed sinus rhythm with multiple PACs/atrial ectopy. Patient denies any chest pain, SOB, lightheadedness, dizziness, abd pain, LE edema, orthopnea.     In the ED, HR 120s although on tele, had multiple atrial ectopy and PVCs, rest of vitals stable.     PMHx:   HTN - Hypertension    Hypercholesterolemia    PC (prostate cancer)    Gout        PSHx:   H/O prostatectomy    H/O carotid endarterectomy    H/O renal artery stenosis    Status post cataract extraction, unspecified laterality        Allergies:  No Known Allergies      Home Meds:    Current Medications:       FAMILY HISTORY:  No pertinent family history in first degree relatives        Social History:  Smoking History:  Alcohol Use:  Drug Use:    REVIEW OF SYSTEMS:  As above       Physical Exam:  T(F): 98 (01-27), Max: 99.9 (01-27)  HR: 120 (01-27) (120 - 127)  BP: 100/57 (01-27) (100/57 - 112/70)  RR: 18 (01-27)  SpO2: 94% (01-27)  GENERAL: No acute distress   ENT: No JVD   CHEST/LUNG: Clear to auscultation bilaterally; No wheeze, equal breath sounds bilaterally   HEART: Regular rate and rhythm; No murmurs, rubs, or gallops  EXTREMITIES:  No clubbing, cyanosis, or edema  PSYCH: Nl behavior, nl affect  NEUROLOGY: AAOx3, non-focal   SKIN: Normal color, No rashes or lesions  LINES:    Cardiovascular Diagnostic Testing:    ECG: Personally reviewed:    Echo:   Echo in 8/2022 outpatient records showed normal EF, mild AS     Stress Testing:     Cath:    Imaging:    CXR:     Labs: Personally reviewed                        6.3    1.94  )-----------( 143      ( 27 Jan 2023 14:00 )             19.3     01-27    138  |  101  |  62<H>  ----------------------------<  131<H>  3.4<L>   |  22  |  3.22<H>    Ca    8.7      27 Jan 2023 14:00  Mg     2.60     01-27    TPro  6.7  /  Alb  3.6  /  TBili  1.5<H>  /  DBili  x   /  AST  213<H>  /  ALT  195<H>  /  AlkPhos  138<H>  01-27    PT/INR - ( 27 Jan 2023 14:00 )   PT: 17.5 sec;   INR: 1.50 ratio         PTT - ( 27 Jan 2023 14:00 )  PTT:19.5 sec  Serum Pro-Brain Natriuretic Peptide: 48327 pg/mL (01-27 @ 14:00)        Thyroid Stimulating Hormone, Serum: 1.65 uIU/mL (01-27 @ 14:00)

## 2023-01-27 NOTE — PROGRESS NOTE ADULT - ASSESSMENT
83 yo M w/ PMH HTN, CKD, renal artery stenosis s/p stent, CAD (LAD 60%, OM2 60%, RCA ), PAD, MDS who presents with anemia and abnormal EKG. Cardiology called for NSTEMI.     #NSTEMI   Most likely type II given hx of CAD, cath in 2021 2/2 anemia. No anginal pain, EKG does show STD in I and II along with V5-6 which is new for patient. Very low suspicion for type I NSTEMI.   -Transfuse PRBC for goal >8   -Recommend echo   -c/w patient's aspirin, plavix   -c/w patient's atorvastatin and carvedilol

## 2023-01-27 NOTE — H&P ADULT - PROBLEM SELECTOR PLAN 4
- acute on chronic  - patient reports herniated discs, spinal stenosis, sciatica  - has limitations in ROM - especially of the back and lower extremities  - Dilaudid 0.5 mg Q4H PRN mild pain and 1 mg Q4H PRN severe pain; modify therapy, as needed  - ensure optimal hydration  - may benefit from Physical therapy eval; please obtain when medical condition stabilizes  - fall precautions  - continues on calcitriol

## 2023-01-27 NOTE — ED PROVIDER NOTE - ATTENDING CONTRIBUTION TO CARE
Pt was seen and evaluated by me. Pt is a 81 y/o male with PMHx of Prostate cancer s/p proctectomy, HTN, HLD, PAD, renal artery stenosis, CKD, and MDS who presented to the ED for low Hg X days and abnormal EKG. Pt was being followed by rheumatology and was noted to have a Hg of 6.7 and was going to have a transfusion but it was noted his EKG was abnormal. Pt admits to weakness with some SOB with exertion. Pt denies any headache, fever, chills, nausea, vomiting, SOB, chest pain, or abd pain.   VITALS: Vitals have been reviewed.  GEN APPEARANCE: Alert and cooperative, non-toxic appearing and in NAD  HEAD: Atraumatic, normocephalic.   EYES: PERRL.  EARS: Gross hearing intact.   NOSE: No nasal discharge.   THROAT: MMM. Oral cavity and pharynx normal. Uvula midline. No swelling. No exudate.    NECK: Supple, no lymphadenopathy  CV: RRR, S1S2, no c/r/m/g. No cyanosis or pallor.   LUNGS: CTAB. No wheezing. No rales. No rhonchi. No diminished breath sounds.   ABDOMEN: Soft, NTND. No guarding or rebound.   MSK/EXT: Spine appears normal, no spine point tenderness.   NEURO: Alert, follows commands. Speech normal. Sensation and motor normal x4 extremities.   SKIN: Yellowish tinge to skin  81 y/o male with PMHx of Prostate cancer s/p proctectomy, HTN, HLD, PAD, renal artery stenosis, CKD, and MDS who presented to the ED for low Hg X days and abnormal EKG.  Concern for Symptomatic anemia/MDS/Cancer/Hemolysis  Labs, EKG, Pt was seen and evaluated by me. Pt is a 81 y/o male with PMHx of Prostate cancer s/p proctectomy, HTN, HLD, PAD, renal artery stenosis, CKD, and MDS who presented to the ED for low Hg X days and abnormal EKG. Pt was being followed by rheumatology and was noted to have a Hg of 6.7 and was going to have a transfusion but it was noted his EKG was abnormal. Pt admits to weakness with some SOB with exertion. Pt denies any headache, fever, chills, nausea, vomiting, SOB, chest pain, or abd pain.   VITALS: Vitals have been reviewed.  GEN APPEARANCE: Alert and cooperative, non-toxic appearing and in NAD  HEAD: Atraumatic, normocephalic.   EYES: PERRL.  EARS: Gross hearing intact.   NOSE: No nasal discharge.   THROAT: MMM. Oral cavity and pharynx normal. Uvula midline. No swelling. No exudate.    NECK: Supple, no lymphadenopathy  CV: RRR, S1S2, no c/r/m/g. No cyanosis or pallor.   LUNGS: CTAB. No wheezing. No rales. No rhonchi. No diminished breath sounds.   ABDOMEN: Soft, NTND. No guarding or rebound.   MSK/EXT: Spine appears normal, no spine point tenderness.   NEURO: Alert, follows commands. Speech normal. Sensation and motor normal x4 extremities.   SKIN: Yellowish tinge to skin  81 y/o male with PMHx of Prostate cancer s/p proctectomy, HTN, HLD, PAD, renal artery stenosis, CKD, and MDS who presented to the ED for low Hg X days and abnormal EKG.  Concern for Symptomatic anemia/MDS/Cancer/Hemolysis  Labs, EKG, Likely transfusion Pt was seen and evaluated by me. Pt is a 81 y/o male with PMHx of Prostate cancer s/p proctectomy, HTN, HLD, PAD, renal artery stenosis, CKD, and MDS who presented to the ED for low Hg X days and abnormal EKG. Pt was being followed by rheumatology and was noted to have a Hg of 6.7 and was going to have a transfusion but it was noted his EKG was abnormal. Pt admits to weakness with some SOB with exertion. Pt denies any headache, fever, chills, nausea, vomiting, SOB, chest pain, or abd pain.   VITALS: Vitals have been reviewed.  GEN APPEARANCE: Alert and cooperative, non-toxic appearing and in NAD  HEAD: Atraumatic, normocephalic.   EYES: PERRL.  EARS: Gross hearing intact.   NOSE: No nasal discharge.   THROAT: MMM. Oral cavity and pharynx normal. Uvula midline. No swelling. No exudate.    NECK: Supple, no lymphadenopathy  CV: Regular rate, irregular rhythm. No cyanosis or pallor.   LUNGS: CTAB. No wheezing. No rales. No rhonchi. No diminished breath sounds.   ABDOMEN: Soft, NTND. No guarding or rebound.   MSK/EXT: Spine appears normal, no spine point tenderness.   NEURO: Alert, follows commands. Speech normal. Sensation and motor normal x4 extremities.   SKIN: Yellowish tinge to skin  81 y/o male with PMHx of Prostate cancer s/p proctectomy, HTN, HLD, PAD, renal artery stenosis, CKD, and MDS who presented to the ED for low Hg X days and abnormal EKG.  Concern for Symptomatic anemia/MDS/Cancer/Hemolysis  Labs, EKG, Likely transfusion

## 2023-01-27 NOTE — HISTORY OF PRESENT ILLNESS
[de-identified] : CHART REVIEW: 81 year-old Mr. MACARIO is seen in consult for macrocytic  anemia (Hgb 9.5, MCV ~125). Patient has multiple medical conditions including stage-3 CKD. He has no symptoms of anemia and has no complaints.  [de-identified] : 05/12/22:\par 81 year-old Mr. MACARIO is seen in consult for macrocytic  anemia (Hgb 9.5, MCV ~125). Patient has multiple medical conditions including stage-3 CKD. He has no symptoms of anemia and has no complaints. \par \par 10/27/22:\par Patient presenting today for follow up for macrocytic anemia. Overall feels well. Endorses some fatigue and shortness of breath with activity. Denies headache, dizziness, Ambulates with walker. He is being followed by his nephrologist who manages his hypertension. Endorses taking diuretics every other day.  \par \par 12/16/2022\par Patient returns for a follow up. He endorses no change in his health status. 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 Patient has some symptoms of fatigue and tiredness. \par \par 1/26/2023\par Patient returns for a follow up. He started taking Revlimid on 12/30/2022. About 2 weeks later he started developing side effects like-- loss  of appetite, body ache, constipation, pins and needle sensation. He also appears to have developed Jaundice.

## 2023-01-28 DIAGNOSIS — R94.31 ABNORMAL ELECTROCARDIOGRAM [ECG] [EKG]: ICD-10-CM

## 2023-01-28 DIAGNOSIS — M54.50 LOW BACK PAIN, UNSPECIFIED: ICD-10-CM

## 2023-01-28 DIAGNOSIS — Z29.9 ENCOUNTER FOR PROPHYLACTIC MEASURES, UNSPECIFIED: ICD-10-CM

## 2023-01-28 DIAGNOSIS — D70.9 NEUTROPENIA, UNSPECIFIED: ICD-10-CM

## 2023-01-28 DIAGNOSIS — D64.9 ANEMIA, UNSPECIFIED: ICD-10-CM

## 2023-01-28 DIAGNOSIS — I48.91 UNSPECIFIED ATRIAL FIBRILLATION: ICD-10-CM

## 2023-01-28 DIAGNOSIS — R79.89 OTHER SPECIFIED ABNORMAL FINDINGS OF BLOOD CHEMISTRY: ICD-10-CM

## 2023-01-28 DIAGNOSIS — R62.7 ADULT FAILURE TO THRIVE: ICD-10-CM

## 2023-01-28 DIAGNOSIS — E87.6 HYPOKALEMIA: ICD-10-CM

## 2023-01-28 DIAGNOSIS — K52.9 NONINFECTIVE GASTROENTERITIS AND COLITIS, UNSPECIFIED: ICD-10-CM

## 2023-01-28 DIAGNOSIS — N17.9 ACUTE KIDNEY FAILURE, UNSPECIFIED: ICD-10-CM

## 2023-01-28 LAB
24R-OH-CALCIDIOL SERPL-MCNC: 25.5 NG/ML — LOW (ref 30–80)
ALBUMIN SERPL ELPH-MCNC: 3.1 G/DL — LOW (ref 3.3–5)
ALBUMIN SERPL ELPH-MCNC: 3.4 G/DL — SIGNIFICANT CHANGE UP (ref 3.3–5)
ALP SERPL-CCNC: 143 U/L — HIGH (ref 40–120)
ALP SERPL-CCNC: 156 U/L — HIGH (ref 40–120)
ALT FLD-CCNC: 272 U/L — HIGH (ref 4–41)
ALT FLD-CCNC: 292 U/L — HIGH (ref 4–41)
ANION GAP SERPL CALC-SCNC: 10 MMOL/L — SIGNIFICANT CHANGE UP (ref 7–14)
ANION GAP SERPL CALC-SCNC: 12 MMOL/L — SIGNIFICANT CHANGE UP (ref 7–14)
APPEARANCE UR: CLEAR — SIGNIFICANT CHANGE UP
AST SERPL-CCNC: 141 U/L — HIGH (ref 4–40)
AST SERPL-CCNC: 230 U/L — HIGH (ref 4–40)
BACTERIA # UR AUTO: NEGATIVE — SIGNIFICANT CHANGE UP
BASOPHILS # BLD AUTO: 0.03 K/UL — SIGNIFICANT CHANGE UP (ref 0–0.2)
BASOPHILS NFR BLD AUTO: 1.5 % — SIGNIFICANT CHANGE UP (ref 0–2)
BILIRUB SERPL-MCNC: 1.8 MG/DL — HIGH (ref 0.2–1.2)
BILIRUB SERPL-MCNC: 2.3 MG/DL — HIGH (ref 0.2–1.2)
BILIRUB UR-MCNC: NEGATIVE — SIGNIFICANT CHANGE UP
BUN SERPL-MCNC: 64 MG/DL — HIGH (ref 7–23)
BUN SERPL-MCNC: 69 MG/DL — HIGH (ref 7–23)
CALCIUM SERPL-MCNC: 8.2 MG/DL — LOW (ref 8.4–10.5)
CALCIUM SERPL-MCNC: 8.5 MG/DL — SIGNIFICANT CHANGE UP (ref 8.4–10.5)
CHLORIDE SERPL-SCNC: 107 MMOL/L — SIGNIFICANT CHANGE UP (ref 98–107)
CHLORIDE SERPL-SCNC: 108 MMOL/L — HIGH (ref 98–107)
CHOLEST SERPL-MCNC: 63 MG/DL — SIGNIFICANT CHANGE UP
CO2 SERPL-SCNC: 20 MMOL/L — LOW (ref 22–31)
CO2 SERPL-SCNC: 20 MMOL/L — LOW (ref 22–31)
COLOR SPEC: YELLOW — SIGNIFICANT CHANGE UP
CREAT SERPL-MCNC: 2.7 MG/DL — HIGH (ref 0.5–1.3)
CREAT SERPL-MCNC: 2.98 MG/DL — HIGH (ref 0.5–1.3)
DIFF PNL FLD: ABNORMAL
EGFR: 20 ML/MIN/1.73M2 — LOW
EGFR: 23 ML/MIN/1.73M2 — LOW
EOSINOPHIL # BLD AUTO: 0.07 K/UL — SIGNIFICANT CHANGE UP (ref 0–0.5)
EOSINOPHIL NFR BLD AUTO: 3.5 % — SIGNIFICANT CHANGE UP (ref 0–6)
EPI CELLS # UR: 0 /HPF — SIGNIFICANT CHANGE UP (ref 0–5)
GLUCOSE SERPL-MCNC: 124 MG/DL — HIGH (ref 70–99)
GLUCOSE SERPL-MCNC: 160 MG/DL — HIGH (ref 70–99)
GLUCOSE UR QL: NEGATIVE — SIGNIFICANT CHANGE UP
HCT VFR BLD CALC: 21.9 % — LOW (ref 39–50)
HCT VFR BLD CALC: 24.7 % — LOW (ref 39–50)
HCYS SERPL-MCNC: 8.7 UMOL/L — SIGNIFICANT CHANGE UP
HDLC SERPL-MCNC: 10 MG/DL — LOW
HGB BLD-MCNC: 7.1 G/DL — LOW (ref 13–17)
HGB BLD-MCNC: 8 G/DL — LOW (ref 13–17)
HYALINE CASTS # UR AUTO: 1 /LPF — SIGNIFICANT CHANGE UP (ref 0–7)
IANC: 1.32 K/UL — LOW (ref 1.8–7.4)
IMM GRANULOCYTES NFR BLD AUTO: 2 % — HIGH (ref 0–0.9)
KETONES UR-MCNC: NEGATIVE — SIGNIFICANT CHANGE UP
LEUKOCYTE ESTERASE UR-ACNC: ABNORMAL
LIPID PNL WITH DIRECT LDL SERPL: 16 MG/DL — SIGNIFICANT CHANGE UP
LYMPHOCYTES # BLD AUTO: 0.36 K/UL — LOW (ref 1–3.3)
LYMPHOCYTES # BLD AUTO: 18 % — SIGNIFICANT CHANGE UP (ref 13–44)
MAGNESIUM SERPL-MCNC: 2.4 MG/DL — SIGNIFICANT CHANGE UP (ref 1.6–2.6)
MAGNESIUM SERPL-MCNC: 2.6 MG/DL — SIGNIFICANT CHANGE UP (ref 1.6–2.6)
MCHC RBC-ENTMCNC: 32.4 GM/DL — SIGNIFICANT CHANGE UP (ref 32–36)
MCHC RBC-ENTMCNC: 32.4 GM/DL — SIGNIFICANT CHANGE UP (ref 32–36)
MCHC RBC-ENTMCNC: 33.5 PG — SIGNIFICANT CHANGE UP (ref 27–34)
MCHC RBC-ENTMCNC: 33.9 PG — SIGNIFICANT CHANGE UP (ref 27–34)
MCV RBC AUTO: 103.3 FL — HIGH (ref 80–100)
MCV RBC AUTO: 104.7 FL — HIGH (ref 80–100)
MONOCYTES # BLD AUTO: 0.18 K/UL — SIGNIFICANT CHANGE UP (ref 0–0.9)
MONOCYTES NFR BLD AUTO: 9 % — SIGNIFICANT CHANGE UP (ref 2–14)
MRSA PCR RESULT.: SIGNIFICANT CHANGE UP
NEUTROPHILS # BLD AUTO: 1.32 K/UL — LOW (ref 1.8–7.4)
NEUTROPHILS NFR BLD AUTO: 66 % — SIGNIFICANT CHANGE UP (ref 43–77)
NITRITE UR-MCNC: POSITIVE
NON HDL CHOLESTEROL: 53 MG/DL — SIGNIFICANT CHANGE UP
NRBC # BLD: 0 /100 WBCS — SIGNIFICANT CHANGE UP (ref 0–0)
NRBC # BLD: 0 /100 WBCS — SIGNIFICANT CHANGE UP (ref 0–0)
NRBC # FLD: 0 K/UL — SIGNIFICANT CHANGE UP (ref 0–0)
NRBC # FLD: 0 K/UL — SIGNIFICANT CHANGE UP (ref 0–0)
NT-PROBNP SERPL-SCNC: HIGH PG/ML
PH UR: 7.5 — SIGNIFICANT CHANGE UP (ref 5–8)
PHOSPHATE SERPL-MCNC: 3.1 MG/DL — SIGNIFICANT CHANGE UP (ref 2.5–4.5)
PHOSPHATE SERPL-MCNC: 4.1 MG/DL — SIGNIFICANT CHANGE UP (ref 2.5–4.5)
PLATELET # BLD AUTO: 122 K/UL — LOW (ref 150–400)
PLATELET # BLD AUTO: 122 K/UL — LOW (ref 150–400)
POTASSIUM SERPL-MCNC: 3.3 MMOL/L — LOW (ref 3.5–5.3)
POTASSIUM SERPL-MCNC: 3.9 MMOL/L — SIGNIFICANT CHANGE UP (ref 3.5–5.3)
POTASSIUM SERPL-SCNC: 3.3 MMOL/L — LOW (ref 3.5–5.3)
POTASSIUM SERPL-SCNC: 3.9 MMOL/L — SIGNIFICANT CHANGE UP (ref 3.5–5.3)
PROT SERPL-MCNC: 6.1 G/DL — SIGNIFICANT CHANGE UP (ref 6–8.3)
PROT SERPL-MCNC: 6.2 G/DL — SIGNIFICANT CHANGE UP (ref 6–8.3)
PROT UR-MCNC: ABNORMAL
RBC # BLD: 2.12 M/UL — LOW (ref 4.2–5.8)
RBC # BLD: 2.36 M/UL — LOW (ref 4.2–5.8)
RBC # FLD: SIGNIFICANT CHANGE UP (ref 10.3–14.5)
RBC # FLD: SIGNIFICANT CHANGE UP (ref 10.3–14.5)
RBC CASTS # UR COMP ASSIST: 2 /HPF — SIGNIFICANT CHANGE UP (ref 0–4)
S AUREUS DNA NOSE QL NAA+PROBE: SIGNIFICANT CHANGE UP
SODIUM SERPL-SCNC: 138 MMOL/L — SIGNIFICANT CHANGE UP (ref 135–145)
SODIUM SERPL-SCNC: 139 MMOL/L — SIGNIFICANT CHANGE UP (ref 135–145)
SP GR SPEC: 1.02 — SIGNIFICANT CHANGE UP (ref 1.01–1.05)
TRIGL SERPL-MCNC: 186 MG/DL — HIGH
TROPONIN T, HIGH SENSITIVITY RESULT: 478 NG/L — CRITICAL HIGH
TSH SERPL-MCNC: 1.6 UIU/ML — SIGNIFICANT CHANGE UP (ref 0.27–4.2)
UROBILINOGEN FLD QL: SIGNIFICANT CHANGE UP
WBC # BLD: 2 K/UL — LOW (ref 3.8–10.5)
WBC # BLD: 2.78 K/UL — LOW (ref 3.8–10.5)
WBC # FLD AUTO: 2 K/UL — LOW (ref 3.8–10.5)
WBC # FLD AUTO: 2.78 K/UL — LOW (ref 3.8–10.5)
WBC UR QL: 63 /HPF — HIGH (ref 0–5)

## 2023-01-28 PROCEDURE — 99222 1ST HOSP IP/OBS MODERATE 55: CPT

## 2023-01-28 PROCEDURE — 99222 1ST HOSP IP/OBS MODERATE 55: CPT | Mod: GC

## 2023-01-28 PROCEDURE — 93010 ELECTROCARDIOGRAM REPORT: CPT

## 2023-01-28 PROCEDURE — 99223 1ST HOSP IP/OBS HIGH 75: CPT

## 2023-01-28 PROCEDURE — 99233 SBSQ HOSP IP/OBS HIGH 50: CPT

## 2023-01-28 RX ORDER — POLYETHYLENE GLYCOL 3350 17 G/17G
17 POWDER, FOR SOLUTION ORAL DAILY
Refills: 0 | Status: DISCONTINUED | OUTPATIENT
Start: 2023-01-28 | End: 2023-02-07

## 2023-01-28 RX ORDER — POTASSIUM CHLORIDE 20 MEQ
20 PACKET (EA) ORAL ONCE
Refills: 0 | Status: COMPLETED | OUTPATIENT
Start: 2023-01-28 | End: 2023-01-28

## 2023-01-28 RX ORDER — ATORVASTATIN CALCIUM 80 MG/1
40 TABLET, FILM COATED ORAL AT BEDTIME
Refills: 0 | Status: DISCONTINUED | OUTPATIENT
Start: 2023-01-28 | End: 2023-01-30

## 2023-01-28 RX ORDER — SENNA PLUS 8.6 MG/1
2 TABLET ORAL AT BEDTIME
Refills: 0 | Status: DISCONTINUED | OUTPATIENT
Start: 2023-01-28 | End: 2023-02-07

## 2023-01-28 RX ORDER — CARVEDILOL PHOSPHATE 80 MG/1
6.25 CAPSULE, EXTENDED RELEASE ORAL EVERY 12 HOURS
Refills: 0 | Status: DISCONTINUED | OUTPATIENT
Start: 2023-01-28 | End: 2023-02-07

## 2023-01-28 RX ORDER — LANOLIN ALCOHOL/MO/W.PET/CERES
3 CREAM (GRAM) TOPICAL AT BEDTIME
Refills: 0 | Status: DISCONTINUED | OUTPATIENT
Start: 2023-01-28 | End: 2023-02-07

## 2023-01-28 RX ORDER — ALLOPURINOL 300 MG
100 TABLET ORAL DAILY
Refills: 0 | Status: DISCONTINUED | OUTPATIENT
Start: 2023-01-28 | End: 2023-02-07

## 2023-01-28 RX ORDER — CILOSTAZOL 100 MG/1
100 TABLET ORAL
Refills: 0 | Status: DISCONTINUED | OUTPATIENT
Start: 2023-01-28 | End: 2023-02-07

## 2023-01-28 RX ORDER — HYDROMORPHONE HYDROCHLORIDE 2 MG/ML
0.5 INJECTION INTRAMUSCULAR; INTRAVENOUS; SUBCUTANEOUS EVERY 4 HOURS
Refills: 0 | Status: DISCONTINUED | OUTPATIENT
Start: 2023-01-28 | End: 2023-02-01

## 2023-01-28 RX ORDER — FOLIC ACID 0.8 MG
1 TABLET ORAL DAILY
Refills: 0 | Status: DISCONTINUED | OUTPATIENT
Start: 2023-01-28 | End: 2023-02-07

## 2023-01-28 RX ORDER — PANTOPRAZOLE SODIUM 20 MG/1
40 TABLET, DELAYED RELEASE ORAL DAILY
Refills: 0 | Status: DISCONTINUED | OUTPATIENT
Start: 2023-01-29 | End: 2023-01-29

## 2023-01-28 RX ORDER — CHLORHEXIDINE GLUCONATE 213 G/1000ML
1 SOLUTION TOPICAL DAILY
Refills: 0 | Status: DISCONTINUED | OUTPATIENT
Start: 2023-01-28 | End: 2023-02-07

## 2023-01-28 RX ORDER — ACETAMINOPHEN 500 MG
650 TABLET ORAL EVERY 6 HOURS
Refills: 0 | Status: DISCONTINUED | OUTPATIENT
Start: 2023-01-28 | End: 2023-02-07

## 2023-01-28 RX ORDER — CALCITRIOL 0.5 UG/1
0.5 CAPSULE ORAL DAILY
Refills: 0 | Status: DISCONTINUED | OUTPATIENT
Start: 2023-01-28 | End: 2023-02-07

## 2023-01-28 RX ORDER — MECLIZINE HCL 12.5 MG
25 TABLET ORAL EVERY 12 HOURS
Refills: 0 | Status: DISCONTINUED | OUTPATIENT
Start: 2023-01-28 | End: 2023-02-07

## 2023-01-28 RX ORDER — POTASSIUM CHLORIDE 20 MEQ
40 PACKET (EA) ORAL ONCE
Refills: 0 | Status: COMPLETED | OUTPATIENT
Start: 2023-01-28 | End: 2023-01-28

## 2023-01-28 RX ORDER — HYDROMORPHONE HYDROCHLORIDE 2 MG/ML
1 INJECTION INTRAMUSCULAR; INTRAVENOUS; SUBCUTANEOUS EVERY 4 HOURS
Refills: 0 | Status: DISCONTINUED | OUTPATIENT
Start: 2023-01-28 | End: 2023-02-04

## 2023-01-28 RX ORDER — PANTOPRAZOLE SODIUM 20 MG/1
40 TABLET, DELAYED RELEASE ORAL ONCE
Refills: 0 | Status: COMPLETED | OUTPATIENT
Start: 2023-01-28 | End: 2023-01-28

## 2023-01-28 RX ADMIN — HYDROMORPHONE HYDROCHLORIDE 0.5 MILLIGRAM(S): 2 INJECTION INTRAMUSCULAR; INTRAVENOUS; SUBCUTANEOUS at 01:15

## 2023-01-28 RX ADMIN — HYDROMORPHONE HYDROCHLORIDE 0.5 MILLIGRAM(S): 2 INJECTION INTRAMUSCULAR; INTRAVENOUS; SUBCUTANEOUS at 02:15

## 2023-01-28 RX ADMIN — HYDROMORPHONE HYDROCHLORIDE 1 MILLIGRAM(S): 2 INJECTION INTRAMUSCULAR; INTRAVENOUS; SUBCUTANEOUS at 21:30

## 2023-01-28 RX ADMIN — HYDROMORPHONE HYDROCHLORIDE 0.5 MILLIGRAM(S): 2 INJECTION INTRAMUSCULAR; INTRAVENOUS; SUBCUTANEOUS at 06:25

## 2023-01-28 RX ADMIN — ATORVASTATIN CALCIUM 40 MILLIGRAM(S): 80 TABLET, FILM COATED ORAL at 21:07

## 2023-01-28 RX ADMIN — HYDROMORPHONE HYDROCHLORIDE 1 MILLIGRAM(S): 2 INJECTION INTRAMUSCULAR; INTRAVENOUS; SUBCUTANEOUS at 21:07

## 2023-01-28 RX ADMIN — Medication 40 MILLIEQUIVALENT(S): at 13:37

## 2023-01-28 RX ADMIN — CHLORHEXIDINE GLUCONATE 1 APPLICATION(S): 213 SOLUTION TOPICAL at 13:37

## 2023-01-28 RX ADMIN — HYDROMORPHONE HYDROCHLORIDE 0.5 MILLIGRAM(S): 2 INJECTION INTRAMUSCULAR; INTRAVENOUS; SUBCUTANEOUS at 05:25

## 2023-01-28 RX ADMIN — Medication 1 MILLIGRAM(S): at 13:36

## 2023-01-28 RX ADMIN — CILOSTAZOL 100 MILLIGRAM(S): 100 TABLET ORAL at 13:37

## 2023-01-28 RX ADMIN — Medication 100 MILLIGRAM(S): at 13:36

## 2023-01-28 RX ADMIN — Medication 20 MILLIEQUIVALENT(S): at 01:31

## 2023-01-28 RX ADMIN — PANTOPRAZOLE SODIUM 40 MILLIGRAM(S): 20 TABLET, DELAYED RELEASE ORAL at 01:31

## 2023-01-28 RX ADMIN — CILOSTAZOL 100 MILLIGRAM(S): 100 TABLET ORAL at 18:26

## 2023-01-28 RX ADMIN — CALCITRIOL 0.5 MICROGRAM(S): 0.5 CAPSULE ORAL at 13:36

## 2023-01-28 RX ADMIN — CARVEDILOL PHOSPHATE 6.25 MILLIGRAM(S): 80 CAPSULE, EXTENDED RELEASE ORAL at 18:25

## 2023-01-28 RX ADMIN — Medication 3 MILLIGRAM(S): at 21:08

## 2023-01-28 RX ADMIN — Medication 30 MILLIGRAM(S): at 18:25

## 2023-01-28 NOTE — PROGRESS NOTE ADULT - SUBJECTIVE AND OBJECTIVE BOX
Patient is a 82y old  Male who presents with a chief complaint of Anemia (2023 14:41)      SUBJECTIVE / OVERNIGHT EVENTS: Pt seen and examined at 12:30pm, no overnight events, denies any bleeding, reports chronic b/l leg pain, no dysuria, no other new issues reported.    MEDICATIONS  (STANDING):  allopurinol 100 milliGRAM(s) Oral daily  atorvastatin 40 milliGRAM(s) Oral at bedtime  calcitriol   Capsule 0.5 MICROGram(s) Oral daily  carvedilol 6.25 milliGRAM(s) Oral every 12 hours  cefepime   IVPB 1000 milliGRAM(s) IV Intermittent every 24 hours  chlorhexidine 2% Cloths 1 Application(s) Topical daily  cilostazol 100 milliGRAM(s) Oral two times a day  folic acid 1 milliGRAM(s) Oral daily    MEDICATIONS  (PRN):  acetaminophen     Tablet .. 650 milliGRAM(s) Oral every 6 hours PRN Temp greater or equal to 38C (100.4F), Mild Pain (1 - 3)  aluminum hydroxide/magnesium hydroxide/simethicone Suspension 30 milliLiter(s) Oral every 6 hours PRN Dyspepsia  HYDROmorphone  Injectable 1 milliGRAM(s) IV Push every 4 hours PRN Severe Pain (7 - 10)  HYDROmorphone  Injectable 0.5 milliGRAM(s) IV Push every 4 hours PRN Moderate Pain (4 - 6)  meclizine 25 milliGRAM(s) Oral every 12 hours PRN Dizziness  melatonin 3 milliGRAM(s) Oral at bedtime PRN Insomnia  polyethylene glycol 3350 17 Gram(s) Oral daily PRN Constipation  senna 2 Tablet(s) Oral at bedtime PRN Constipation      Vital Signs Last 24 Hrs  T(C): 37 (2023 12:31), Max: 37.7 (2023 00:00)  T(F): 98.6 (2023 12:31), Max: 99.9 (2023 00:00)  HR: 78 (2023 12:31) (66 - 120)  BP: 123/54 (2023 12:31) (100/59 - 124/84)  BP(mean): --  RR: 16 (2023 12:31) (16 - 19)  SpO2: 95% (2023 03:00) (94% - 96%)    Parameters below as of 2023 12:31  Patient On (Oxygen Delivery Method): nasal cannula  O2 Flow (L/min): 2    CAPILLARY BLOOD GLUCOSE        I&O's Summary    2023 07:01  -  2023 07:00  --------------------------------------------------------  IN: 850 mL / OUT: 400 mL / NET: 450 mL    2023 07:01  -  2023 16:15  --------------------------------------------------------  IN: 100 mL / OUT: 150 mL / NET: -50 mL        PHYSICAL EXAM:  GENERAL: NAD, well-developed  CHEST/LUNG: Clear to auscultation bilaterally; No wheeze  HEART: Regular rate and rhythm, + LESA  ABDOMEN: Soft, Nontender, Nondistended  EXTREMITIES: + b/l LE edema  PSYCH: Calm  NEUROLOGY: AAOx3  SKIN: No rashes or lesions    LABS:                        7.1    2.00  )-----------( 122      ( 2023 06:12 )             21.9     28    139  |  107  |  69<H>  ----------------------------<  124<H>  3.3<L>   |  20<L>  |  2.98<H>    Ca    8.2<L>      2023 06:12  Phos  4.1       Mg     2.60         TPro  6.1  /  Alb  3.1<L>  /  TBili  1.8<H>  /  DBili  x   /  AST  230<H>  /  ALT  292<H>  /  AlkPhos  143<H>      PT/INR - ( 2023 14:00 )   PT: 17.5 sec;   INR: 1.50 ratio         PTT - ( 2023 14:00 )  PTT:19.5 sec      Urinalysis Basic - ( 2023 09:15 )    Color: Yellow / Appearance: Clear / S.018 / pH: x  Gluc: x / Ketone: Negative  / Bili: Negative / Urobili: <2 mg/dL   Blood: x / Protein: 100 mg/dL / Nitrite: Positive   Leuk Esterase: Large / RBC: 2 /HPF / WBC 63 /HPF   Sq Epi: x / Non Sq Epi: 0 /HPF / Bacteria: Negative        RADIOLOGY & ADDITIONAL TESTS:    Imaging Personally Reviewed:    Consultant(s) Notes Reviewed:      Care Discussed with Consultants/Other Providers:

## 2023-01-28 NOTE — CONSULT NOTE ADULT - SUBJECTIVE AND OBJECTIVE BOX
Patient is a 82y old  Male who presents with a chief complaint of Anemia (2023 11:30)    HPI:  is a 82 year old male, with past history significant for MDS, Stage II diastolic dysfunction, HTN, HLD, Prostate cancer s/p Prostatectomy, Carotid artery stenosis s/p Endarterectomy, PAD, Renal artery stenosis, and CKD, who was sent by outpatient rheumatologist for new onset atrial fibrillation after presenting to undergo blood transfusion.  Patient reports complete loss of appetite and insomnia since taking Lenalidomide, which was prescribed ~ one month ago and complains of significant fatigue. He has had some right sided abdominal pain. He also reports severe lower back pain with difficulty ambulating due to pain of the legs, which is related to spinal stenosis, herniated discs and sciatica.  Here pt was admitted for management of A-fib and severe anemia s/p 2 units PRBC. Pt also noted to have JUAN RAMON on CKD and elevated Transaminases. He had a low grade temp to 37.7 C (99.9 F).  Bands = 7.8.  IANC = 1.38. Pt was started on cefepime 1 gram Q24H  and ID consulted for the same.      REVIEW OF SYSTEMS  [  ] ROS unobtainable because:    [ x ] All other systems negative except as noted below    Constitutional:  [ ] fever [ ] chills  [ ] weight loss  [ ]night sweat  [ ]poor appetite/PO intake [ ]fatigue   Skin:  [ ] rash [ ] phlebitis	  Eyes: [ ] icterus [ ] pain  [ ] discharge	  ENMT: [ ] sore throat  [ ] thrush [ ] ulcers [ ] exudates [ ]anosmia  Respiratory: [ ] dyspnea [ ] hemoptysis [ ] cough [ ] sputum	  Cardiovascular:  [ ] chest pain [ ] palpitations [ ] edema	  Gastrointestinal:  [ ] nausea [ ] vomiting [ ] diarrhea [ ] constipation [ ] pain	  Genitourinary:  [ ] dysuria [ ] frequency [ ] hematuria [ ] discharge [ ] flank pain  [ ] incontinence  Musculoskeletal:  [ ] myalgias [ ] arthralgias [ ] arthritis  [ ] back pain  Neurological:  [ ] headache [ ] weakness [ ] seizures  [ ] confusion/altered mental status    prior hospital charts reviewed [V]  primary team notes reviewed [V]  other consultant notes reviewed [V]    PAST MEDICAL & SURGICAL HISTORY:  HTN - Hypertension  Hypercholesterolemia  PC (prostate cancer) s/p surgical resection 3 years ago  Gout  Aneurysm, ascending aorta  H/O prostatectomy  H/O carotid endarterectomy  H/O renal artery stenosis s/p stent in Left RA  Status post cataract extraction, unspecified laterality    SOCIAL HISTORY:  - Denied smoking/vaping/alcohol/recreational drug use    FAMILY HISTORY:  No pertinent family history in first degree relatives        Allergies  No Known Allergies        ANTIMICROBIALS:  cefepime   IVPB 1000 every 24 hours      ANTIMICROBIALS (past 90 days):  MEDICATIONS  (STANDING):    cefepime   IVPB   100 mL/Hr IV Intermittent (23 @ 22:57)        OTHER MEDS:   MEDICATIONS  (STANDING):  acetaminophen     Tablet .. 650 every 6 hours PRN  allopurinol 100 daily  aluminum hydroxide/magnesium hydroxide/simethicone Suspension 30 every 6 hours PRN  atorvastatin 40 at bedtime  carvedilol 6.25 every 12 hours  cilostazol 100 two times a day  HYDROmorphone  Injectable 1 every 4 hours PRN  HYDROmorphone  Injectable 0.5 every 4 hours PRN  meclizine 25 every 12 hours PRN  melatonin 3 at bedtime PRN  polyethylene glycol 3350 17 daily PRN  senna 2 at bedtime PRN      VITALS:  Vital Signs Last 24 Hrs  T(F): 99 (23 @ 03:00), Max: 99.9 (23 @ 10:20)    Vital Signs Last 24 Hrs  HR: 83 (23 @ 03:00) (66 - 122)  BP: 111/70 (23 @ 03:00) (100/57 - 124/84)  RR: 19 (23 @ 03:00)  SpO2: 95% (23 @ 03:00) (94% - 99%)  Wt(kg): --    EXAM:    GA: NAD, AOx3  HEENT: oral cavity no lesion  CV: nl S1/S2, no RMG  Lungs: CTAB, No distress  Abd: BS+, soft, nontender, no rebounding pain  Ext: no edema  Neuro: No focal deficits  Skin: Intact  IV: no phlebitis    Labs:                        7.1    2.00  )-----------( 122      ( 2023 06:12 )             21.9         139  |  107  |  69<H>  ----------------------------<  124<H>  3.3<L>   |  20<L>  |  2.98<H>    Ca    8.2<L>      2023 06:12  Phos  4.1       Mg     2.60         TPro  6.1  /  Alb  3.1<L>  /  TBili  1.8<H>  /  DBili  x   /  AST  230<H>  /  ALT  292<H>  /  AlkPhos  143<H>        WBC Trend:  WBC Count: 2.00 (23 @ 06:12)  WBC Count: 2.08 (23 @ 23:00)  WBC Count: 1.94 (23 @ 14:00)  WBC Count: 2.75 (23 @ 13:31)      Auto Neutrophil #: 1.32 K/uL (23 @ 06:12)  Auto Neutrophil #: 1.54 K/uL (23 @ 14:00)  Band Neutrophils %: 7.8 % (23 @ 14:00)  Auto Neutrophil #: 1.95 K/uL (23 @ 13:31)  Auto Neutrophil #: 4.18 K/uL (22 @ 10:40)      Creatine Trend:  Creatinine, Serum: 2.98 ()  Creatinine, Serum: 3.22 ()      Liver Biochemical Testing Trend:  Alanine Aminotransferase (ALT/SGPT): 292 *H* ()  Alanine Aminotransferase (ALT/SGPT): 195 *H* ()  Aspartate Aminotransferase (AST/SGOT): 230 (23 @ 06:12)  Aspartate Aminotransferase (AST/SGOT): 213 (23 @ 14:00)  Bilirubin Total, Serum: 1.8 ()  Bilirubin Total, Serum: 1.5 ()      Trend LDH      Auto Eosinophil %: 3.5 % (23 @ 06:12)  Auto Eosinophil %: 0.9 % (23 @ 14:00)  Auto Eosinophil %: 3.3 % (23 @ 13:31)      Urinalysis Basic - ( 2023 09:15 )    Color: Yellow / Appearance: Clear / S.018 / pH: x  Gluc: x / Ketone: Negative  / Bili: Negative / Urobili: <2 mg/dL   Blood: x / Protein: 100 mg/dL / Nitrite: Positive   Leuk Esterase: Large / RBC: 2 /HPF / WBC 63 /HPF   Sq Epi: x / Non Sq Epi: 0 /HPF / Bacteria: Negative        MICROBIOLOGY:    Procalcitonin, Serum: 0.44 ()  Ferritin, Serum: 2281 ()  Serum Pro-Brain Natriuretic Peptide: 66586 ()  Serum Pro-Brain Natriuretic Peptide: 10897 ()    Troponin T, High Sensitivity Result: 478 ()  Troponin T, High Sensitivity Result: 411 ()  Troponin T, High Sensitivity Result: 416 ()      RADIOLOGY:  imaging below personally reviewed    < from: Xray Chest 1 View AP/PA (23 @ 19:04) >  Generalized hazy indistinct increased lung markings with slightly more localized confluent hazy opacity in medial right lung base suggesting degree of congestion with edema. No pleural effusions or pneumothorax. Heart size and mediastinal width inaccurately assessed on the projection. Scant aortic arch calcifications.  Trachea midline.  Mild spinal degenerative changes.   < end of copied text >       Patient is a 82y old  Male who presents with a chief complaint of Anemia (2023 11:30)    HPI:  is a 82 year old male, with past history significant for MDS, Stage II diastolic dysfunction, HTN, HLD, Prostate cancer s/p Prostatectomy, Carotid artery stenosis s/p Endarterectomy, PAD, Renal artery stenosis, and CKD, who was sent by outpatient rheumatologist for new onset atrial fibrillation after presenting to undergo blood transfusion.  Patient reports complete loss of appetite and insomnia since taking Lenalidomide, which was prescribed ~ one month ago and complains of significant fatigue. He has chronic pain of the legs, which is related to spinal stenosis, herniated discs and sciatica.  Here pt was admitted for management of A-fib and severe anemia s/p 2 units PRBC. Pt also noted to have JUAN RAMON on CKD and elevated Transaminases. He had a low grade temp to 37.7 C (99.9 F).  Bands = 7.8.  IANC = 1.38. Pt was started on cefepime 1 gram Q24H  and ID consulted for the same. on exam, pt reports significant LE pain on movement. pt noted to have right tow swelling. pt reports he has a hx of gout in his toe in the past      REVIEW OF SYSTEMS  [  ] ROS unobtainable because:    [ x ] All other systems negative except as noted below    Constitutional:  [ ] fever [ ] chills  [ ] weight loss  [ ]night sweat  [ ]poor appetite/PO intake [ ]fatigue   Skin:  [ ] rash [ ] phlebitis	  Eyes: [ ] icterus [ ] pain  [ ] discharge	  ENMT: [ ] sore throat  [ ] thrush [ ] ulcers [ ] exudates [ ]anosmia  Respiratory: [ ] dyspnea [ ] hemoptysis [ ] cough [ ] sputum	  Cardiovascular:  [ ] chest pain [ ] palpitations [ ] edema	  Gastrointestinal:  [ ] nausea [ ] vomiting [ ] diarrhea [ ] constipation [ ] pain	  Genitourinary:  [ ] dysuria [ ] frequency [ ] hematuria [ ] discharge [ ] flank pain  [ ] incontinence  Musculoskeletal:  [ ] myalgias [ ] arthralgias [ ] arthritis  [ ] back pain  Neurological:  [ ] headache [ ] weakness [ ] seizures  [ ] confusion/altered mental status    prior hospital charts reviewed [V]  primary team notes reviewed [V]  other consultant notes reviewed [V]    PAST MEDICAL & SURGICAL HISTORY:  HTN - Hypertension  Hypercholesterolemia  PC (prostate cancer) s/p surgical resection 3 years ago  Gout  Aneurysm, ascending aorta  H/O prostatectomy  H/O carotid endarterectomy  H/O renal artery stenosis s/p stent in Left RA  Status post cataract extraction, unspecified laterality    SOCIAL HISTORY:  - Denied smoking/vaping/alcohol/recreational drug use    FAMILY HISTORY:  No pertinent family history in first degree relatives        Allergies  No Known Allergies        ANTIMICROBIALS:  cefepime   IVPB 1000 every 24 hours      ANTIMICROBIALS (past 90 days):  MEDICATIONS  (STANDING):    cefepime   IVPB   100 mL/Hr IV Intermittent (23 @ 22:57)        OTHER MEDS:   MEDICATIONS  (STANDING):  acetaminophen     Tablet .. 650 every 6 hours PRN  allopurinol 100 daily  aluminum hydroxide/magnesium hydroxide/simethicone Suspension 30 every 6 hours PRN  atorvastatin 40 at bedtime  carvedilol 6.25 every 12 hours  cilostazol 100 two times a day  HYDROmorphone  Injectable 1 every 4 hours PRN  HYDROmorphone  Injectable 0.5 every 4 hours PRN  meclizine 25 every 12 hours PRN  melatonin 3 at bedtime PRN  polyethylene glycol 3350 17 daily PRN  senna 2 at bedtime PRN      VITALS:  Vital Signs Last 24 Hrs  T(F): 99 (23 @ 03:00), Max: 99.9 (23 @ 10:20)    Vital Signs Last 24 Hrs  HR: 83 (23 @ 03:00) (66 - 122)  BP: 111/70 (23 @ 03:00) (100/57 - 124/84)  RR: 19 (23 @ 03:00)  SpO2: 95% (23 @ 03:00) (94% - 99%)  Wt(kg): --    EXAM:    GA: NAD, AOx3  HEENT: oral cavity no lesion  CV: nl S1/S2, no RMG  Lungs: CTAB, No distress  Abd: BS+, soft, nontender, no rebounding pain  Ext: no edema  Neuro: No focal deficits  Skin: Intact  IV: no phlebitis    Labs:                        7.1    2.00  )-----------( 122      ( 2023 06:12 )             21.9         139  |  107  |  69<H>  ----------------------------<  124<H>  3.3<L>   |  20<L>  |  2.98<H>    Ca    8.2<L>      2023 06:12  Phos  4.1       Mg     2.60         TPro  6.1  /  Alb  3.1<L>  /  TBili  1.8<H>  /  DBili  x   /  AST  230<H>  /  ALT  292<H>  /  AlkPhos  143<H>        WBC Trend:  WBC Count: 2.00 (23 @ 06:12)  WBC Count: 2.08 (23 @ 23:00)  WBC Count: 1.94 (23 @ 14:00)  WBC Count: 2.75 (23 @ 13:31)      Auto Neutrophil #: 1.32 K/uL (23 @ 06:12)  Auto Neutrophil #: 1.54 K/uL (23 @ 14:00)  Band Neutrophils %: 7.8 % (23 @ 14:00)  Auto Neutrophil #: 1.95 K/uL (23 @ 13:31)  Auto Neutrophil #: 4.18 K/uL (22 @ 10:40)      Creatine Trend:  Creatinine, Serum: 2.98 ()  Creatinine, Serum: 3.22 ()      Liver Biochemical Testing Trend:  Alanine Aminotransferase (ALT/SGPT): 292 *H* ()  Alanine Aminotransferase (ALT/SGPT): 195 *H* ()  Aspartate Aminotransferase (AST/SGOT): 230 (23 @ 06:12)  Aspartate Aminotransferase (AST/SGOT): 213 (23 @ 14:00)  Bilirubin Total, Serum: 1.8 ()  Bilirubin Total, Serum: 1.5 ()      Trend LDH      Auto Eosinophil %: 3.5 % (23 @ 06:12)  Auto Eosinophil %: 0.9 % (23 @ 14:00)  Auto Eosinophil %: 3.3 % (23 @ 13:31)      Urinalysis Basic - ( 2023 09:15 )    Color: Yellow / Appearance: Clear / S.018 / pH: x  Gluc: x / Ketone: Negative  / Bili: Negative / Urobili: <2 mg/dL   Blood: x / Protein: 100 mg/dL / Nitrite: Positive   Leuk Esterase: Large / RBC: 2 /HPF / WBC 63 /HPF   Sq Epi: x / Non Sq Epi: 0 /HPF / Bacteria: Negative        MICROBIOLOGY:    Procalcitonin, Serum: 0.44 ()  Ferritin, Serum: 2281 ()  Serum Pro-Brain Natriuretic Peptide: 24118 ()  Serum Pro-Brain Natriuretic Peptide: 36872 ()    Troponin T, High Sensitivity Result: 478 ()  Troponin T, High Sensitivity Result: 411 ()  Troponin T, High Sensitivity Result: 416 ()      RADIOLOGY:  imaging below personally reviewed    < from: Xray Chest 1 View AP/PA (23 @ 19:04) >  Generalized hazy indistinct increased lung markings with slightly more localized confluent hazy opacity in medial right lung base suggesting degree of congestion with edema. No pleural effusions or pneumothorax. Heart size and mediastinal width inaccurately assessed on the projection. Scant aortic arch calcifications.  Trachea midline.  Mild spinal degenerative changes.   < end of copied text >       Patient is a 82y old  Male who presents with a chief complaint of Anemia (2023 11:30)    HPI:  is a 82 year old male, with past history significant for MDS, Stage II diastolic dysfunction, HTN, HLD, Prostate cancer s/p Prostatectomy, Carotid artery stenosis s/p Endarterectomy, PAD, Renal artery stenosis, and CKD, who was sent by outpatient rheumatologist for new onset atrial fibrillation after presenting to undergo blood transfusion.  Patient reports complete loss of appetite and insomnia since taking Lenalidomide, which was prescribed ~ one month ago and complains of significant fatigue. He has chronic pain of the legs, which is related to spinal stenosis, herniated discs and sciatica.  Here pt was admitted for management of A-fib and severe anemia s/p 2 units PRBC. Pt also noted to have JUAN RAMON on CKD and elevated Transaminases. He had a low grade temp to 37.7 C (99.9 F).  Bands = 7.8.  IANC = 1.38. Pt was started on cefepime 1 gram Q24H  and ID consulted for the same. on exam, pt reports significant LE pain on movement. pt noted to have right toe swelling. pt reports he has a hx of gout in his toe in the past      REVIEW OF SYSTEMS  [  ] ROS unobtainable because:    [ x ] All other systems negative except as noted below    Constitutional:  [ ] fever [ ] chills  [ ] weight loss  [ ]night sweat  [ ]poor appetite/PO intake [ ]fatigue   Skin:  [ ] rash [ ] phlebitis	  Eyes: [ ] icterus [ ] pain  [ ] discharge	  ENMT: [ ] sore throat  [ ] thrush [ ] ulcers [ ] exudates [ ]anosmia  Respiratory: [ ] dyspnea [ ] hemoptysis [ ] cough [ ] sputum	  Cardiovascular:  [ ] chest pain [ ] palpitations [ ] edema	  Gastrointestinal:  [ ] nausea [ ] vomiting [ ] diarrhea [ ] constipation [ ] pain	  Genitourinary:  [ ] dysuria [ ] frequency [ ] hematuria [ ] discharge [ ] flank pain  [ ] incontinence  Musculoskeletal:  [ ] myalgias [ ] arthralgias [ ] arthritis  [ ] back pain  Neurological:  [ ] headache [ ] weakness [ ] seizures  [ ] confusion/altered mental status    prior hospital charts reviewed [V]  primary team notes reviewed [V]  other consultant notes reviewed [V]    PAST MEDICAL & SURGICAL HISTORY:  HTN - Hypertension  Hypercholesterolemia  PC (prostate cancer) s/p surgical resection 3 years ago  Gout  Aneurysm, ascending aorta  H/O prostatectomy  H/O carotid endarterectomy  H/O renal artery stenosis s/p stent in Left RA  Status post cataract extraction, unspecified laterality    SOCIAL HISTORY:  - Denied smoking/vaping/alcohol/recreational drug use    FAMILY HISTORY:  No pertinent family history in first degree relatives        Allergies  No Known Allergies        ANTIMICROBIALS:  cefepime   IVPB 1000 every 24 hours      ANTIMICROBIALS (past 90 days):  MEDICATIONS  (STANDING):    cefepime   IVPB   100 mL/Hr IV Intermittent (23 @ 22:57)        OTHER MEDS:   MEDICATIONS  (STANDING):  acetaminophen     Tablet .. 650 every 6 hours PRN  allopurinol 100 daily  aluminum hydroxide/magnesium hydroxide/simethicone Suspension 30 every 6 hours PRN  atorvastatin 40 at bedtime  carvedilol 6.25 every 12 hours  cilostazol 100 two times a day  HYDROmorphone  Injectable 1 every 4 hours PRN  HYDROmorphone  Injectable 0.5 every 4 hours PRN  meclizine 25 every 12 hours PRN  melatonin 3 at bedtime PRN  polyethylene glycol 3350 17 daily PRN  senna 2 at bedtime PRN      VITALS:  Vital Signs Last 24 Hrs  T(F): 99 (23 @ 03:00), Max: 99.9 (23 @ 10:20)    Vital Signs Last 24 Hrs  HR: 83 (23 @ 03:00) (66 - 122)  BP: 111/70 (23 @ 03:00) (100/57 - 124/84)  RR: 19 (23 @ 03:00)  SpO2: 95% (23 @ 03:00) (94% - 99%)  Wt(kg): --    EXAM:    GA: NAD, AOx3  HEENT: oral cavity no lesion  CV: nl S1/S2, no RMG  Lungs: CTAB, No distress  Abd: BS+, soft, nontender, no rebounding pain  Ext: Right great toe is swollen red and tender  Neuro: No focal deficits  Skin: Intact  IV: no phlebitis    Labs:                        7.1    2.00  )-----------( 122      ( 2023 06:12 )             21.9         139  |  107  |  69<H>  ----------------------------<  124<H>  3.3<L>   |  20<L>  |  2.98<H>    Ca    8.2<L>      2023 06:12  Phos  4.1       Mg     2.60         TPro  6.1  /  Alb  3.1<L>  /  TBili  1.8<H>  /  DBili  x   /  AST  230<H>  /  ALT  292<H>  /  AlkPhos  143<H>        WBC Trend:  WBC Count: 2.00 (23 @ 06:12)  WBC Count: 2.08 (23 @ 23:00)  WBC Count: 1.94 (23 @ 14:00)  WBC Count: 2.75 (23 @ 13:31)      Auto Neutrophil #: 1.32 K/uL (23 @ 06:12)  Auto Neutrophil #: 1.54 K/uL (23 @ 14:00)  Band Neutrophils %: 7.8 % (23 @ 14:00)  Auto Neutrophil #: 1.95 K/uL (23 @ 13:31)  Auto Neutrophil #: 4.18 K/uL (22 @ 10:40)      Creatine Trend:  Creatinine, Serum: 2.98 ()  Creatinine, Serum: 3.22 ()      Liver Biochemical Testing Trend:  Alanine Aminotransferase (ALT/SGPT): 292 *H* ()  Alanine Aminotransferase (ALT/SGPT): 195 *H* ()  Aspartate Aminotransferase (AST/SGOT): 230 (23 @ 06:12)  Aspartate Aminotransferase (AST/SGOT): 213 (23 @ 14:00)  Bilirubin Total, Serum: 1.8 ()  Bilirubin Total, Serum: 1.5 ()      Trend LDH      Auto Eosinophil %: 3.5 % (23 @ 06:12)  Auto Eosinophil %: 0.9 % (23 @ 14:00)  Auto Eosinophil %: 3.3 % (23 @ 13:31)      Urinalysis Basic - ( 2023 09:15 )    Color: Yellow / Appearance: Clear / S.018 / pH: x  Gluc: x / Ketone: Negative  / Bili: Negative / Urobili: <2 mg/dL   Blood: x / Protein: 100 mg/dL / Nitrite: Positive   Leuk Esterase: Large / RBC: 2 /HPF / WBC 63 /HPF   Sq Epi: x / Non Sq Epi: 0 /HPF / Bacteria: Negative        MICROBIOLOGY:    Procalcitonin, Serum: 0.44 ()  Ferritin, Serum: 2281 ()  Serum Pro-Brain Natriuretic Peptide: 06129 ()  Serum Pro-Brain Natriuretic Peptide: 62482 ()    Troponin T, High Sensitivity Result: 478 ()  Troponin T, High Sensitivity Result: 411 ()  Troponin T, High Sensitivity Result: 416 ()      RADIOLOGY:  imaging below personally reviewed    < from: Xray Chest 1 View AP/PA (23 @ 19:04) >  Generalized hazy indistinct increased lung markings with slightly more localized confluent hazy opacity in medial right lung base suggesting degree of congestion with edema. No pleural effusions or pneumothorax. Heart size and mediastinal width inaccurately assessed on the projection. Scant aortic arch calcifications.  Trachea midline.  Mild spinal degenerative changes.   < end of copied text >

## 2023-01-28 NOTE — CONSULT NOTE ADULT - ASSESSMENT
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 Mcmahon), Stage II diastolic dysfunction, HTN, HLD, Prostate cancer - s/p Prostatectomy, Carotid artery stenosis - s/p Endarterectomy, PAD, Renal artery stenosis, and CKD, and presented to the ED, after being sent for new onset atrial fibrillation.  Seen and evaluated at bedside; ill appearing, but in NAD.  Confirms being sent in by outpatient provider due to dysrhythmia, after presenting to undergo blood transfusion.  Patient reports complete loss of appetite and insomnia since taking Lenalidomide, which was prescribed ~ one month ago.  Has not noted any signs of blood in the urine or stools.  No hematemesis, hemoptysis or epistaxis.  States no chest pain, palpitations, shortness of breath, headache, dizziness or lightheadedness.  Complains of significant fatigue.  Has had some right sided abdominal pain.  Complains of erratic bowel movements; constipated at times for 2 to 3 days, then has diarrhea after taking stool softener.  Last bowel movement was approximately 2 days ago.  Reports severe lower back pain with difficulty ambulating due to pain of the legs, which is related to spinal stenosis, herniated discs and sciatica.    Vital signs upon ED presentation as follows: BP = 105/65, HR = 122, RR = 18, T = 36.7 C (98 F), O2 Sat = 99% on RA.  Diagnosed with Anemia and prescribed 2 units p-RBCs in the ED. 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 Follows with Dr. Mcmahon.  Started on Revlimid 5 mg daily 12/30/22. Revlimid and Allopurinol was on hold for 2 week   #Anemia, macrocytic-worsening, s/p 2U PRBC. FOBT is positive. Retic 0.8%. likely combined etiology of MDS with blood loss   #PNA, on cefepime. IANC 1.32. 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 Mcmahon), Stage II diastolic dysfunction, HTN, HLD, Prostate cancer - s/p Prostatectomy, Carotid artery stenosis - s/p Endarterectomy, PAD, Renal artery stenosis, and CKD, and presented to the ED, after being sent for new onset atrial fibrillation.  Seen and evaluated at bedside; ill appearing, but in NAD.  Confirms being sent in by outpatient provider due to dysrhythmia, after presenting to undergo blood transfusion.  Patient reports complete loss of appetite and insomnia since taking Lenalidomide, which was prescribed ~ one month ago.  Has not noted any signs of blood in the urine or stools.  No hematemesis, hemoptysis or epistaxis.  States no chest pain, palpitations, shortness of breath, headache, dizziness or lightheadedness.  Complains of significant fatigue.  Has had some right sided abdominal pain.  Complains of erratic bowel movements; constipated at times for 2 to 3 days, then has diarrhea after taking stool softener.  Last bowel movement was approximately 2 days ago.  Reports severe lower back pain with difficulty ambulating due to pain of the legs, which is related to spinal stenosis, herniated discs and sciatica.    Vital signs upon ED presentation as follows: BP = 105/65, HR = 122, RR = 18, T = 36.7 C (98 F), O2 Sat = 99% on RA.  Diagnosed with Anemia and prescribed 2 units p-RBCs in the ED. 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 Follows with Dr. Mcmahon.  Started on Revlimid 5 mg daily 12/30/22. Revlimid and Allopurinol was on hold for 2 week   #Anemia (acute worsening from ~9 to 5), macrocytic, s/p 2U PRBC. FOBT is posimbtive. Retic 0.8%. likely combined etiology of MDS with blood loss.    #PNA, on cefepime. IANC 1.32. Not neutropenic. Leukopenia may  2/2 to acute infection     Plan:  -Monitor daily CBC+Diff  -If ANC<500 will consider Zarxio  -Maintain Hb >7, PLT>20K if no bleeding  -Active type and screen   -Cont hold Lenalidomide   -Would obtain GI on board   -Would obtain cardiology on board in view of PAF  and high CHADS-VASC score, with acute bleeding. Unclear appropriateness of AC   -Patient to follow with Dr. Mcmahon       Case discussed with Dr. 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 Mcmahon), Stage II diastolic dysfunction, HTN, HLD, Prostate cancer - s/p Prostatectomy, Carotid artery stenosis - s/p Endarterectomy, PAD, Renal artery stenosis, and CKD, and presented to the ED, after being sent for new onset atrial fibrillation.  Seen and evaluated at bedside; ill appearing, but in NAD.  Confirms being sent in by outpatient provider due to dysrhythmia, after presenting to undergo blood transfusion.  Patient reports complete loss of appetite and insomnia since taking Lenalidomide, which was prescribed ~ one month ago.  Has not noted any signs of blood in the urine or stools.  No hematemesis, hemoptysis or epistaxis.  States no chest pain, palpitations, shortness of breath, headache, dizziness or lightheadedness.  Complains of significant fatigue.  Has had some right sided abdominal pain.  Complains of erratic bowel movements; constipated at times for 2 to 3 days, then has diarrhea after taking stool softener.  Last bowel movement was approximately 2 days ago.  Reports severe lower back pain with difficulty ambulating due to pain of the legs, which is related to spinal stenosis, herniated discs and sciatica.        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 Follows with Dr. Mcmahon.  Started on Revlimid 5 mg daily 12/30/22. Revlimid and Allopurinol was on hold for 2 week   #Anemia (acute worsening from ~9 to 6.8), macrocytic, s/p 2U PRBC. FOBT is posimbtive. Retic 0.8%. likely combined etiology of MDS with blood loss.    #PNA, on cefepime. IANC 1.32. Not neutropenic. Leukopenia may  2/2 to acute infection     Plan:  -Monitor daily CBC+Diff  -If ANC<500 will consider Zarxio  -Maintain Hb >7, PLT>20K if no bleeding  -Active type and screen   -Cont hold Lenalidomide   -Would obtain GI on board   -Would obtain cardiology on board in view of PAF  and high CHADS-VASC score, with acute bleeding. Unclear appropriateness of AC   -Patient to follow with Dr. Mcmahon       Case discussed with Dr. 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 Mcmahon), Stage II diastolic dysfunction, HTN, HLD, Prostate cancer - s/p Prostatectomy, Carotid artery stenosis - s/p Endarterectomy, PAD, Renal artery stenosis, and CKD, and presented to the ED, after being sent for new onset atrial fibrillation.  Seen and evaluated at bedside; ill appearing, but in NAD.  Confirms being sent in by outpatient provider due to dysrhythmia, after presenting to undergo blood transfusion.  Patient reports complete loss of appetite and insomnia since taking Lenalidomide, which was prescribed ~ one month ago.  Has not noted any signs of blood in the urine or stools.  No hematemesis, hemoptysis or epistaxis.  States no chest pain, palpitations, shortness of breath, headache, dizziness or lightheadedness.  Complains of significant fatigue.  Has had some right sided abdominal pain.  Complains of erratic bowel movements; constipated at times for 2 to 3 days, then has diarrhea after taking stool softener.  Last bowel movement was approximately 2 days ago.  Reports severe lower back pain with difficulty ambulating due to pain of the legs, which is related to spinal stenosis, herniated discs and sciatica.    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 Follows with Dr. Mcmahon.  Started on Revlimid 5 mg daily 12/30/22. Revlimid and Allopurinol was on hold for 2 week   #Anemia (acute worsening from ~9 to 6.8), macrocytic, s/p 2U PRBC. FOBT is posimbtive. Retic 0.8%. likely combined etiology of MDS with blood loss.    #PNA, on cefepime. IANC 1.32. Not neutropenic. Leukopenia may  2/2 to acute infection     Plan:  -Monitor daily CBC+Diff  -If ANC<500 will consider Zarxio  -Maintain Hb >7, PLT>20K if no bleeding  -Active type and screen   -Cont hold Lenalidomide   -Would obtain GI on board   -Would obtain cardiology on board in view of PAF  and high CHADS-VASC score, with acute bleeding. Unclear appropriateness of AC   -Patient to follow with Dr. Mcmahon       Case discussed with Dr. Hanks

## 2023-01-28 NOTE — PATIENT PROFILE ADULT - FALL HARM RISK - HARM RISK INTERVENTIONS

## 2023-01-28 NOTE — PROGRESS NOTE ADULT - ASSESSMENT
[ x ]  Lab studies personally reviewed  [ x ]  Radiology personally reviewed  [ x ]  Old records personally reviewed    82 year old male, with past history significant for MDS, Stage II diastolic dysfunction, HTN, HLD, Prostate cancer - s/p Prostatectomy, Carotid artery stenosis - s/p Endarterectomy, PAD, Renal artery stenosis, and CKD, and presented to the ED, after being sent by outpatient rheumatologist, secondary to new onset atrial fibrillation.  Diagnosed with Anemia in the ED.

## 2023-01-28 NOTE — PROGRESS NOTE ADULT - PROBLEM SELECTOR PLAN 2
- low grade temp to 37.7 C (99.9 F).  Bands = 7.8.  IANC = 1.38  - CXR = "Lower lobe predominant B/L opacities, right greater than left. Interstitial prominence..." (personally reviewed)  - started on cefepime 1 gram Q24H (modified dose due to CKD), discussed with ID, per ID pt with rt toe pain, likely gout flare rec to stop abx and start treatment for gout  - will give steroids as pt with gib and unable to give colchicine due to elevated creatinine  - f/u blood cultures  - +UA but pt asymptomatic, no need to treat with abx  - ensure optimal hydration  - f/u electrolytes  - ID consulted, f/u consult recs

## 2023-01-28 NOTE — CONSULT NOTE ADULT - SUBJECTIVE AND OBJECTIVE BOX
Hematology Oncology Consult Note    HPI:  82 year old male, with past history significant for MDS (, mds, Stage II diastolic dysfunction, HTN, HLD, Prostate cancer - s/p Prostatectomy, Carotid artery stenosis - s/p Endarterectomy, PAD, Renal artery stenosis, and CKD, and presented to the ED, after being sent by outpatient rheumatologist, secondary to new onset atrial fibrillation.  Seen and evaluated at bedside; ill appearing, but in NAD.  Confirms being sent in by outpatient provider due to dysrhythmia, after presenting to undergo blood transfusion.  Patient reports complete loss of appetite and insomnia since taking Lenalidomide, which was prescribed ~ one month ago.  Has not noted any signs of blood in the urine or stools.  No hematemesis, hemoptysis or epistaxis.  States no chest pain, palpitations, shortness of breath, headache, dizziness or lightheadedness.  Complains of significant fatigue.  Has had some right sided abdominal pain.  Complains of erratic bowel movements; constipated at times for 2 to 3 days, then has diarrhea after taking stool softener.  Last bowel movement was approximately 2 days ago.  Reports severe lower back pain with difficulty ambulating due to pain of the legs, which is related to spinal stenosis, herniated discs and sciatica.    Vital signs upon ED presentation as follows: BP = 105/65, HR = 122, RR = 18, T = 36.7 C (98 F), O2 Sat = 99% on RA.  Diagnosed with Anemia and prescribed 2 units p-RBCs in the ED. (27 Jan 2023 21:49)      PAST MEDICAL & SURGICAL HISTORY:  HTN - Hypertension      Hypercholesterolemia      PC (prostate cancer)  s/p surgical resection 3 years ago      Gout      Aneurysm, ascending aorta      H/O prostatectomy      H/O carotid endarterectomy      H/O renal artery stenosis  s/p stent in Left RA      Status post cataract extraction, unspecified laterality          FAMILY HISTORY:  No pertinent family history in first degree relatives        MEDICATIONS  (STANDING):  allopurinol 100 milliGRAM(s) Oral daily  atorvastatin 40 milliGRAM(s) Oral at bedtime  calcitriol   Capsule 0.5 MICROGram(s) Oral daily  carvedilol 6.25 milliGRAM(s) Oral every 12 hours  cefepime   IVPB 1000 milliGRAM(s) IV Intermittent every 24 hours  chlorhexidine 2% Cloths 1 Application(s) Topical daily  cilostazol 100 milliGRAM(s) Oral two times a day  folic acid 1 milliGRAM(s) Oral daily  potassium chloride    Tablet ER 40 milliEquivalent(s) Oral once    MEDICATIONS  (PRN):  acetaminophen     Tablet .. 650 milliGRAM(s) Oral every 6 hours PRN Temp greater or equal to 38C (100.4F), Mild Pain (1 - 3)  aluminum hydroxide/magnesium hydroxide/simethicone Suspension 30 milliLiter(s) Oral every 6 hours PRN Dyspepsia  HYDROmorphone  Injectable 1 milliGRAM(s) IV Push every 4 hours PRN Severe Pain (7 - 10)  HYDROmorphone  Injectable 0.5 milliGRAM(s) IV Push every 4 hours PRN Moderate Pain (4 - 6)  meclizine 25 milliGRAM(s) Oral every 12 hours PRN Dizziness  melatonin 3 milliGRAM(s) Oral at bedtime PRN Insomnia  polyethylene glycol 3350 17 Gram(s) Oral daily PRN Constipation  senna 2 Tablet(s) Oral at bedtime PRN Constipation      Allergies    No Known Allergies    Intolerances        SOCIAL HISTORY: No EtOH, no tobacco    Review of Systems:  General: denies fevers/chills  Respiratory: denies cough, shortness of breath  Cardiovascular: denies chest pain, palpitations  Gastrointestinal: denies nausea, vomiting, abdominal pain, constipation, diarrhea, melena, hematochezia  MSK: denies joint pain or muscle pain  Neuro: denies headache, weakness, or parasthesias  Skin: denies rash, petichiae, echymoses  Psych: denies anxiety or sleep disturbances        T(F): 99 (01-28-23 @ 03:00), Max: 99.9 (01-28-23 @ 00:00)  HR: 83 (01-28-23 @ 03:00)  BP: 111/70 (01-28-23 @ 03:00)  RR: 19 (01-28-23 @ 03:00)  SpO2: 95% (01-28-23 @ 03:00)  Wt(kg): --    PHYSICAL EXAM:    Constitutional: NAD  Respiratory: CTA b/l, symmetric chest rise, with normal respiratory effort  Cardiovascular: RRR  Gastrointestinal: soft, NTND  Extremities:  no edema  MSK: no obvious abnormalities  Neurological: Grossly intact  Skin: no rash, no echymoses, no petichiae  Psych: normal affect                          7.1    2.00  )-----------( 122      ( 28 Jan 2023 06:12 )             21.9       01-28    139  |  107  |  69<H>  ----------------------------<  124<H>  3.3<L>   |  20<L>  |  2.98<H>    Ca    8.2<L>      28 Jan 2023 06:12  Phos  4.1     01-28  Mg     2.60     01-28    TPro  6.1  /  Alb  3.1<L>  /  TBili  1.8<H>  /  DBili  x   /  AST  230<H>  /  ALT  292<H>  /  AlkPhos  143<H>  01-28      Magnesium, Serum: 2.60 mg/dL (01-28 @ 06:12)  Phosphorus Level, Serum: 4.1 mg/dL (01-28 @ 06:12)  Magnesium, Serum: 2.60 mg/dL (01-27 @ 14:00)       Hematology Oncology Consult Note    HPI:  82 year old male, with past history significant for MDS ( 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 Mcmahon), Stage II diastolic dysfunction, HTN, HLD, Prostate cancer - s/p Prostatectomy, Carotid artery stenosis - s/p Endarterectomy, PAD, Renal artery stenosis, and CKD, and presented to the ED, after being sent for new onset atrial fibrillation.  Seen and evaluated at bedside; ill appearing, but in NAD.  Confirms being sent in by outpatient provider due to dysrhythmia, after presenting to undergo blood transfusion.  Patient reports complete loss of appetite and insomnia since taking Lenalidomide, which was prescribed ~ one month ago.  Has not noted any signs of blood in the urine or stools.  No hematemesis, hemoptysis or epistaxis.  States no chest pain, palpitations, shortness of breath, headache, dizziness or lightheadedness.  Complains of significant fatigue.  Has had some right sided abdominal pain.  Complains of erratic bowel movements; constipated at times for 2 to 3 days, then has diarrhea after taking stool softener.  Last bowel movement was approximately 2 days ago.  Reports severe lower back pain with difficulty ambulating due to pain of the legs, which is related to spinal stenosis, herniated discs and sciatica.    Vital signs upon ED presentation as follows: BP = 105/65, HR = 122, RR = 18, T = 36.7 C (98 F), O2 Sat = 99% on RA.  Diagnosed with Anemia and prescribed 2 units p-RBCs in the ED. 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 Revlimid and Allopurinol was on hold for 2 week            PAST MEDICAL & SURGICAL HISTORY:  HTN - Hypertension      Hypercholesterolemia      PC (prostate cancer)  s/p surgical resection 3 years ago      Gout      Aneurysm, ascending aorta      H/O prostatectomy      H/O carotid endarterectomy      H/O renal artery stenosis  s/p stent in Left RA      Status post cataract extraction, unspecified laterality          FAMILY HISTORY:  No pertinent family history in first degree relatives        MEDICATIONS  (STANDING):  allopurinol 100 milliGRAM(s) Oral daily  atorvastatin 40 milliGRAM(s) Oral at bedtime  calcitriol   Capsule 0.5 MICROGram(s) Oral daily  carvedilol 6.25 milliGRAM(s) Oral every 12 hours  cefepime   IVPB 1000 milliGRAM(s) IV Intermittent every 24 hours  chlorhexidine 2% Cloths 1 Application(s) Topical daily  cilostazol 100 milliGRAM(s) Oral two times a day  folic acid 1 milliGRAM(s) Oral daily  potassium chloride    Tablet ER 40 milliEquivalent(s) Oral once    MEDICATIONS  (PRN):  acetaminophen     Tablet .. 650 milliGRAM(s) Oral every 6 hours PRN Temp greater or equal to 38C (100.4F), Mild Pain (1 - 3)  aluminum hydroxide/magnesium hydroxide/simethicone Suspension 30 milliLiter(s) Oral every 6 hours PRN Dyspepsia  HYDROmorphone  Injectable 1 milliGRAM(s) IV Push every 4 hours PRN Severe Pain (7 - 10)  HYDROmorphone  Injectable 0.5 milliGRAM(s) IV Push every 4 hours PRN Moderate Pain (4 - 6)  meclizine 25 milliGRAM(s) Oral every 12 hours PRN Dizziness  melatonin 3 milliGRAM(s) Oral at bedtime PRN Insomnia  polyethylene glycol 3350 17 Gram(s) Oral daily PRN Constipation  senna 2 Tablet(s) Oral at bedtime PRN Constipation      Allergies    No Known Allergies    Intolerances        SOCIAL HISTORY: No EtOH, no tobacco    Review of Systems:  General: denies fevers/chills  Respiratory: denies cough, shortness of breath  Cardiovascular: denies chest pain, palpitations  Gastrointestinal: denies nausea, vomiting, abdominal pain, constipation, diarrhea, melena, hematochezia  MSK: denies joint pain or muscle pain  Neuro: denies headache, weakness, or parasthesias  Skin: denies rash, petichiae, echymoses  Psych: denies anxiety or sleep disturbances        T(F): 99 (01-28-23 @ 03:00), Max: 99.9 (01-28-23 @ 00:00)  HR: 83 (01-28-23 @ 03:00)  BP: 111/70 (01-28-23 @ 03:00)  RR: 19 (01-28-23 @ 03:00)  SpO2: 95% (01-28-23 @ 03:00)  Wt(kg): --    PHYSICAL EXAM:    Constitutional: NAD  Respiratory: CTA b/l, symmetric chest rise, with normal respiratory effort  Cardiovascular: RRR  Gastrointestinal: soft, NTND  Extremities:  no edema  MSK: no obvious abnormalities                            7.1    2.00  )-----------( 122      ( 28 Jan 2023 06:12 )             21.9       01-28    139  |  107  |  69<H>  ----------------------------<  124<H>  3.3<L>   |  20<L>  |  2.98<H>    Ca    8.2<L>      28 Jan 2023 06:12  Phos  4.1     01-28  Mg     2.60     01-28    TPro  6.1  /  Alb  3.1<L>  /  TBili  1.8<H>  /  DBili  x   /  AST  230<H>  /  ALT  292<H>  /  AlkPhos  143<H>  01-28      Magnesium, Serum: 2.60 mg/dL (01-28 @ 06:12)  Phosphorus Level, Serum: 4.1 mg/dL (01-28 @ 06:12)  Magnesium, Serum: 2.60 mg/dL (01-27 @ 14:00)       Hematology Oncology Consult Note    HPI:  82 year old male, with past history significant for MDS ( 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 Mcmahon), Stage II diastolic dysfunction, HTN, HLD, Prostate cancer - s/p Prostatectomy, Carotid artery stenosis - s/p Endarterectomy, PAD, Renal artery stenosis, and CKD, and presented to the ED, after being sent for new onset atrial fibrillation.  Seen and evaluated at bedside; ill appearing, but in NAD.  Confirms being sent in by outpatient provider due to dysrhythmia, after presenting to undergo blood transfusion.  Patient reports complete loss of appetite and insomnia since taking Lenalidomide, which was prescribed ~ one month ago.  Has not noted any signs of blood in the urine or stools.  No hematemesis, hemoptysis or epistaxis.  States no chest pain, palpitations, shortness of breath, headache, dizziness or lightheadedness.  Complains of significant fatigue.  Has had some right sided abdominal pain.  Complains of erratic bowel movements; constipated at times for 2 to 3 days, then has diarrhea after taking stool softener.  Last bowel movement was approximately 2 days ago.  Reports severe lower back pain with difficulty ambulating due to pain of the legs, which is related to spinal stenosis, herniated discs and sciatica.    Vital signs upon ED presentation as follows: BP = 105/65, HR = 122, RR = 18, T = 36.7 C (98 F), O2 Sat = 99% on RA.  Diagnosed with Anemia and prescribed 2 units p-RBCs in the ED. 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 Revlimid and Allopurinol was on hold for 2 week        PAST MEDICAL & SURGICAL HISTORY:  HTN - Hypertension      Hypercholesterolemia      PC (prostate cancer)  s/p surgical resection 3 years ago      Gout      Aneurysm, ascending aorta      H/O prostatectomy      H/O carotid endarterectomy      H/O renal artery stenosis  s/p stent in Left RA      Status post cataract extraction, unspecified laterality          FAMILY HISTORY:  No pertinent family history in first degree relatives        MEDICATIONS  (STANDING):  allopurinol 100 milliGRAM(s) Oral daily  atorvastatin 40 milliGRAM(s) Oral at bedtime  calcitriol   Capsule 0.5 MICROGram(s) Oral daily  carvedilol 6.25 milliGRAM(s) Oral every 12 hours  cefepime   IVPB 1000 milliGRAM(s) IV Intermittent every 24 hours  chlorhexidine 2% Cloths 1 Application(s) Topical daily  cilostazol 100 milliGRAM(s) Oral two times a day  folic acid 1 milliGRAM(s) Oral daily  potassium chloride    Tablet ER 40 milliEquivalent(s) Oral once    MEDICATIONS  (PRN):  acetaminophen     Tablet .. 650 milliGRAM(s) Oral every 6 hours PRN Temp greater or equal to 38C (100.4F), Mild Pain (1 - 3)  aluminum hydroxide/magnesium hydroxide/simethicone Suspension 30 milliLiter(s) Oral every 6 hours PRN Dyspepsia  HYDROmorphone  Injectable 1 milliGRAM(s) IV Push every 4 hours PRN Severe Pain (7 - 10)  HYDROmorphone  Injectable 0.5 milliGRAM(s) IV Push every 4 hours PRN Moderate Pain (4 - 6)  meclizine 25 milliGRAM(s) Oral every 12 hours PRN Dizziness  melatonin 3 milliGRAM(s) Oral at bedtime PRN Insomnia  polyethylene glycol 3350 17 Gram(s) Oral daily PRN Constipation  senna 2 Tablet(s) Oral at bedtime PRN Constipation      Allergies    No Known Allergies    Intolerances        SOCIAL HISTORY: No EtOH, no tobacco    Review of Systems:  General: denies fevers/chills  Respiratory: denies cough, shortness of breath  Cardiovascular: denies chest pain, palpitations  Gastrointestinal: denies nausea, vomiting, abdominal pain, constipation, diarrhea, melena, hematochezia  MSK: denies joint pain or muscle pain  Neuro: denies headache, weakness, or parasthesias  Skin: denies rash, petichiae, echymoses  Psych: denies anxiety or sleep disturbances        T(F): 99 (01-28-23 @ 03:00), Max: 99.9 (01-28-23 @ 00:00)  HR: 83 (01-28-23 @ 03:00)  BP: 111/70 (01-28-23 @ 03:00)  RR: 19 (01-28-23 @ 03:00)  SpO2: 95% (01-28-23 @ 03:00)  Wt(kg): --    PHYSICAL EXAM:    Constitutional: NAD  Respiratory: CTA b/l, symmetric chest rise, with normal respiratory effort  Cardiovascular: RRR  Gastrointestinal: soft, NTND  Extremities:  no edema  MSK: no obvious abnormalities                            7.1    2.00  )-----------( 122      ( 28 Jan 2023 06:12 )             21.9       01-28    139  |  107  |  69<H>  ----------------------------<  124<H>  3.3<L>   |  20<L>  |  2.98<H>    Ca    8.2<L>      28 Jan 2023 06:12  Phos  4.1     01-28  Mg     2.60     01-28    TPro  6.1  /  Alb  3.1<L>  /  TBili  1.8<H>  /  DBili  x   /  AST  230<H>  /  ALT  292<H>  /  AlkPhos  143<H>  01-28      Magnesium, Serum: 2.60 mg/dL (01-28 @ 06:12)  Phosphorus Level, Serum: 4.1 mg/dL (01-28 @ 06:12)  Magnesium, Serum: 2.60 mg/dL (01-27 @ 14:00)       Hematology Oncology Consult Note    HPI:  82 year old male, with past history significant for MDS ( 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 Mcmahon), Stage II diastolic dysfunction, HTN, HLD, Prostate cancer - s/p Prostatectomy, Carotid artery stenosis - s/p Endarterectomy, PAD, Renal artery stenosis, and CKD, and presented to the ED, after being sent for new onset atrial fibrillation.  Seen and evaluated at bedside; ill appearing, but in NAD.  Confirms being sent in by outpatient provider due to dysrhythmia, after presenting to undergo blood transfusion.  Patient reports complete loss of appetite and insomnia since taking Lenalidomide, which was prescribed ~ one month ago.  Has not noted any signs of blood in the urine or stools.  No hematemesis, hemoptysis or epistaxis.  States no chest pain, palpitations, shortness of breath, headache, dizziness or lightheadedness.  Complains of significant fatigue.  Has had some right sided abdominal pain.  Complains of erratic bowel movements; constipated at times for 2 to 3 days, then has diarrhea after taking stool softener.  Last bowel movement was approximately 2 days ago.  Reports severe lower back pain with difficulty ambulating due to pain of the legs, which is related to spinal stenosis, herniated discs and sciatica.    Vital signs upon ED presentation as follows: BP = 105/65, HR = 122, RR = 18, T = 36.7 C (98 F), O2 Sat = 99% on RA.  Hb 6.8 from ~9 and prescribed 2 units p-RBCs in the ED. 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 Revlimid and Allopurinol was on hold for 2 week        PAST MEDICAL & SURGICAL HISTORY:  HTN - Hypertension      Hypercholesterolemia      PC (prostate cancer)  s/p surgical resection 3 years ago      Gout      Aneurysm, ascending aorta      H/O prostatectomy      H/O carotid endarterectomy      H/O renal artery stenosis  s/p stent in Left RA      Status post cataract extraction, unspecified laterality          FAMILY HISTORY:  No pertinent family history in first degree relatives        MEDICATIONS  (STANDING):  allopurinol 100 milliGRAM(s) Oral daily  atorvastatin 40 milliGRAM(s) Oral at bedtime  calcitriol   Capsule 0.5 MICROGram(s) Oral daily  carvedilol 6.25 milliGRAM(s) Oral every 12 hours  cefepime   IVPB 1000 milliGRAM(s) IV Intermittent every 24 hours  chlorhexidine 2% Cloths 1 Application(s) Topical daily  cilostazol 100 milliGRAM(s) Oral two times a day  folic acid 1 milliGRAM(s) Oral daily  potassium chloride    Tablet ER 40 milliEquivalent(s) Oral once    MEDICATIONS  (PRN):  acetaminophen     Tablet .. 650 milliGRAM(s) Oral every 6 hours PRN Temp greater or equal to 38C (100.4F), Mild Pain (1 - 3)  aluminum hydroxide/magnesium hydroxide/simethicone Suspension 30 milliLiter(s) Oral every 6 hours PRN Dyspepsia  HYDROmorphone  Injectable 1 milliGRAM(s) IV Push every 4 hours PRN Severe Pain (7 - 10)  HYDROmorphone  Injectable 0.5 milliGRAM(s) IV Push every 4 hours PRN Moderate Pain (4 - 6)  meclizine 25 milliGRAM(s) Oral every 12 hours PRN Dizziness  melatonin 3 milliGRAM(s) Oral at bedtime PRN Insomnia  polyethylene glycol 3350 17 Gram(s) Oral daily PRN Constipation  senna 2 Tablet(s) Oral at bedtime PRN Constipation      Allergies    No Known Allergies    Intolerances        SOCIAL HISTORY: No EtOH, no tobacco    Review of Systems:  General: denies fevers/chills  Respiratory: denies cough, shortness of breath  Cardiovascular: denies chest pain, palpitations  Gastrointestinal: denies nausea, vomiting, abdominal pain, constipation, diarrhea, melena, hematochezia  MSK: denies joint pain or muscle pain  Neuro: denies headache, weakness, or parasthesias  Skin: denies rash, petichiae, echymoses  Psych: denies anxiety or sleep disturbances        T(F): 99 (01-28-23 @ 03:00), Max: 99.9 (01-28-23 @ 00:00)  HR: 83 (01-28-23 @ 03:00)  BP: 111/70 (01-28-23 @ 03:00)  RR: 19 (01-28-23 @ 03:00)  SpO2: 95% (01-28-23 @ 03:00)  Wt(kg): --    PHYSICAL EXAM:    Constitutional: NAD  Respiratory: CTA b/l, symmetric chest rise, with normal respiratory effort  Cardiovascular: RRR  Gastrointestinal: soft, NTND  Extremities:  no edema  MSK: no obvious abnormalities                            7.1    2.00  )-----------( 122      ( 28 Jan 2023 06:12 )             21.9       01-28    139  |  107  |  69<H>  ----------------------------<  124<H>  3.3<L>   |  20<L>  |  2.98<H>    Ca    8.2<L>      28 Jan 2023 06:12  Phos  4.1     01-28  Mg     2.60     01-28    TPro  6.1  /  Alb  3.1<L>  /  TBili  1.8<H>  /  DBili  x   /  AST  230<H>  /  ALT  292<H>  /  AlkPhos  143<H>  01-28      Magnesium, Serum: 2.60 mg/dL (01-28 @ 06:12)  Phosphorus Level, Serum: 4.1 mg/dL (01-28 @ 06:12)  Magnesium, Serum: 2.60 mg/dL (01-27 @ 14:00)

## 2023-01-28 NOTE — CONSULT NOTE ADULT - ASSESSMENT
is a 82 year old male, with past history significant for MDS, Stage II diastolic dysfunction, HTN, HLD, Prostate cancer s/p Prostatectomy, Carotid artery stenosis s/p Endarterectomy, PAD, Renal artery stenosis, and CKD, who was sent by outpatient rheumatologist for new onset atrial fibrillation after presenting to undergo blood transfusion. Here pt was admitted for management of A-fib and severe anemia s/p 2 units PRBC. He had a low grade temp to 37.7 C (99.9 F).  Bands = 7.8.  IANC = 1.38. Pt was started on cefepime 1 gram Q24H  and ID consulted for the same.    WORKUP  UA (1/27):  Nitrite: Positive, LE Large/ WBC 63 /HPF  Bacteria: Negative  Xray Chest (01.27): Generalized hazy indistinct increased lung markings with slightly more localized confluent hazy opacity in medial right lung base suggesting degree of congestion with edema.   Procalcitonin, Serum: 0.44 (01-27)    DIAGNOSIS and IMPRESSION  ·	Pancytopenia 2/2 MDS  ·	Elevated Transaminases  ·	JUAN RAMON/CKD  ·	Pyuria    ANC is 1.4k today and Tamx is 99.9  Pt is currently on cefepime   IVPB 1000 every 24 hours (1/27)    RECOMMENDATIONS  F/u US abd  f/u Blood cx      PT TO BE SEEN. PRELIM NOTE  PENDING RECS. PLEASE WAIT FOR FINAL RECS AFTER DISCUSSION WITH ATTENDINGJanis Young MD, PGY5  ID fellow  [employer] Teams Preferred  After 5pm/weekends call [phone]    is a 82 year old male, with past history significant for MDS, Stage II diastolic dysfunction, HTN, HLD, Prostate cancer s/p Prostatectomy, Carotid artery stenosis s/p Endarterectomy, PAD, Renal artery stenosis, and CKD, who was sent by outpatient rheumatologist for new onset atrial fibrillation after presenting to undergo blood transfusion. Here pt was admitted for management of A-fib and severe anemia s/p 2 units PRBC. He had a low grade temp to 37.7 C (99.9 F).  Bands = 7.8.  IANC = 1.38. Pt was started on cefepime 1 gram Q24H  and ID consulted for the same.    WORKUP  UA (1/27):  Nitrite: Positive, LE Large/ WBC 63 /HPF  Bacteria: Negative  Xray Chest (01.27): Generalized hazy indistinct increased lung markings with slightly more localized confluent hazy opacity in medial right lung base suggesting degree of congestion with edema.   Procalcitonin, Serum: 0.44 (01-27)    DIAGNOSIS and IMPRESSION  ·	Right toe Gout  ·	Pancytopenia 2/2 MDS  ·	Elevated Transaminases  ·	JUAN RAMON/CKD  ·	Pyuria    ANC is 1.4k today and Tamx is 99.9  Pt is currently on cefepime   IVPB 1000 every 24 hours (1/27)  NO urinary symptoms  Hx of gout in toes and allopurinol was recently stopped few weeks back and right toe is red, swollen and tender    RECOMMENDATIONS  F/u US abd  f/u Blood cx  Recommend Xray of foot  Gout treatment as per medicine  Recommend monitor off abx    Pt seen and examined. Case d/w attending   Recommendation provided to primary team    Christian Young MD, PGY5  ID fellow  [employer] Teams Preferred  After 5pm/weekends call [phone]

## 2023-01-28 NOTE — PROGRESS NOTE ADULT - ASSESSMENT
ASSESSMENT:  (1)CKD - stage 4, with GFR 20-30ml/min - in setting of atrophic right kidney, from [facility].   (2)JUAN RAMON - is he in JUAN RAMON/is he uremic? He appears quiet ill.  (3)Anemia - s/p 2 units PRBC   (4)EP - rapid afib  (5)CV - dry     ASSESSMENT:  (1)CKD - stage 4, with GFR 20-30ml/min - in setting of atrophic right kidney, from [facility]  (2)JUAN RAMON - is he in JUAN RAMON/is he uremic? He appears quiet ill; creatinine slightly lower   (3)Anemia - s/p 2 units PRBCs   (4)EP - rapid afib   (5)CV - BP stable/ acceptable    RECOMMEND:  (1)Defer IVF for now, trend renal fxn  (2)Monitor I/Os  (3)PRBCs per primary team  (4)GI eval pending  (5)F/u echo  (6)D/C Lenalidomide  (7)Meds for GFR <15  (8)BMP, Mg, Phos     D/w Dr. Michael Wilson, NP-C  [facility]  [phone]

## 2023-01-28 NOTE — CONSULT NOTE ADULT - ATTENDING COMMENTS
Here for Afib.   Unclear significance of 99.9F and mild bandemia.   Clinically nontoxic, no acute complaints, would not have come to the hospital if not referred.   Monitor off antibiotics.   Looks like he has gout though. Was he seeing his Rheumatologist for this?   Associated cellulitis? Will monitor.   Monitor ANC - downtrending, has MDS.     Demario Aviles MD   Infectious Disease   Available on TEAMS. After 5PM and on weekends please page fellow on call or call [phone]

## 2023-01-28 NOTE — CONSULT NOTE ADULT - ATTENDING COMMENTS
82-year-old female with hypertension/hyperlipidemia/diabetes/CHF with EF of 45%/CAD status post PCI/CABG/A. fib/pacemaker found with recent upper GI bleed 10/2022 with ulcerated gastric antral polyp status post hot snare polypectomy presented with cholangitis on this admission 1/17/2023 status post ERCP with plastic biliary stent placement for decompression, hospital course complicated by volume overload, development of post ERCP pancreatitis, status post flexible sigmoidoscopy/EGD yesterday.  EGD demonstrated oozing at the cardia gastric polyp intervened on previously seen, status post polypectomy with hemostatic clip placement, epinephrine injection, bipolar cautery and hemospray injection.    1.  Clear liquid diet  2.  PPI IV twice daily  3.  Trend hemoglobin Q 24h, transfuse if hemoglobin less than 7.5 addendum- attestation placed on the incorrect patient addendum- attestation placed on the incorrect patient    This is a 82 year old male with MDS receiving transfusions as outpt here with worsening anemia  83 yo M, w/ PMHx significant for MDS, Stage II diastolic dysfunction, HTN, HLD, Prostate cancer - s/p Prostatectomy, Carotid artery stenosis - s/p Endarterectomy, PAD, Renal artery stenosis, and CKD, and presented to the ED, after being sent by outpatient rheumatologist, secondary to new onset atrial fibrillation. Pt found to have pancytopenia, elevated TBili/transaminases, JUAN RAMON. GI consulted for anemia without overt bleeding.    No evidence of acute GI bleed  baseline 8-9, but found with 6.3 on admission  FOBT+  Neutropenic fever on admission    Rec  1- acute on chronic anemia with history of MDS, no overt bleed with pos FOBT  2- needs medical management in setting of neutropenia, no planned procedures given risk of translocation of bacteria in this settig  3- regular diet  4- PPI daily

## 2023-01-28 NOTE — PROGRESS NOTE ADULT - PROBLEM SELECTOR PLAN 1
- macrocytic.  Hgb = 6.3, MCV = 110.9, in the context of MDS  - presented for blood transfusion as outpatient and sent in due to new onset A-fib  - FOBT positive  - initially tachycardic to 122 in the ED  - s/p 2 units of p-RBCs  - anemia is likely of multifactorial etiology; chronic disease, blood loss, dietary deficiency  - ferritin elevated  - GI consulted, cont liquid diet, ppi  - monitor H&H and tranfuse prn  - folic acid started  - Hem/Onc consulted, per heme hold revlimid  - check abd sono with doppler - macrocytic.  Hgb = 6.3, MCV = 110.9, in the context of MDS  - presented for blood transfusion as outpatient and sent in due to new onset A-fib  - FOBT positive  - initially tachycardic to 122 in the ED  - s/p 2 units of p-RBCs  - anemia is likely of multifactorial etiology; chronic disease, blood loss, dietary deficiency  - ferritin elevated  - GI consulted, cont liquid diet, ppi  - monitor H&H and tranfuse prn if hb<7.5, rpt hb if less <7.5 transfuse prbc  - folic acid started  - Hem/Onc consulted, per heme hold revlimid  - check abd sono with doppler

## 2023-01-28 NOTE — CONSULT NOTE ADULT - SUBJECTIVE AND OBJECTIVE BOX
HPI:  81 yo M, w/ PMHx significant for MDS, Stage II diastolic dysfunction, HTN, HLD, Prostate cancer - s/p Prostatectomy, Carotid artery stenosis - s/p Endarterectomy, PAD, Renal artery stenosis, and CKD, and presented to the ED, after being sent by outpatient rheumatologist, secondary to new onset atrial fibrillation.  Seen and evaluated at bedside; ill appearing, but in NAD. Confirms being sent in by outpatient provider due to dysrhythmia, after presenting to undergo blood transfusion. Patient reports complete loss of appetite and insomnia since taking Lenalidomide, which was prescribed about one month ago for his MDS. He stopped taking it and the allopurinol about 2 weeks ago. He denies melena, hematochezia, abdominal pain, nausea, vomiting. States he suffers from constipation, will take stool softener and subsequently have diarrhea. Denies taking nsaids. Takes plavix and cilostazol    Patient tachycardic to 120s on admission. Noted to have pancytopenia. Pt also found to have JUAN RAMON, elevated pro-BNP and HST. Elevated Tbili and transaminases    Allergies:  No Known Allergies    Home Medications:  acetaminophen 325 mg oral tablet: 2 tab(s) orally every 6 hours, As needed, Temp greater or equal to 38C (100.4F), Mild Pain (1 - 3) (2023 23:57)  allopurinol 100 mg oral tablet: orally once a day (2023 23:57)  aluminum hydroxide-magnesium hydroxide 200 mg-200 mg/5 mL oral suspension: 30 milliliter(s) orally every 6 hours, As needed, Dyspepsia (2023 23:57)  aspirin 81 mg oral tablet, chewable: 1 tab(s) orally once a day (2023 23:57)  calcitriol 0.5 mcg oral capsule: 1 cap(s) orally once a day (2023 23:57)  carvedilol 25 mg oral tablet: 1 tab(s) orally 2 times a day (2023 23:57)  cilostazol 100 mg oral tablet: 1 tab(s) orally 2 times a day (2023 23:57)  ketoconazole 2% topical cream: Apply topically to affected area 2 times a day (2023 23:57)  Lenalidomide 5 mg oral capsule: 1 cap(s) orally once a day (2023 23:57)  meclizine 25 mg oral tablet: 1 tab(s) orally every 12 hours (2023 23:57)  mometasone 0.1% topical cream: Apply topically to affected area 2 times a day (apply sparingly0 (2023 23:57)  polyethylene glycol 3350 oral powder for reconstitution: 17 gram(s) orally once a day (2023 23:57)  rosuvastatin 10 mg oral tablet: 1 tab(s) orally once a day (2023 23:57)  senna oral tablet: 2 tab(s) orally once a day (at bedtime) (2023 23:57)    Hospital Medications:  acetaminophen     Tablet .. 650 milliGRAM(s) Oral every 6 hours PRN  allopurinol 100 milliGRAM(s) Oral daily  aluminum hydroxide/magnesium hydroxide/simethicone Suspension 30 milliLiter(s) Oral every 6 hours PRN  atorvastatin 40 milliGRAM(s) Oral at bedtime  calcitriol   Capsule 0.5 MICROGram(s) Oral daily  carvedilol 6.25 milliGRAM(s) Oral every 12 hours  cefepime   IVPB 1000 milliGRAM(s) IV Intermittent every 24 hours  chlorhexidine 2% Cloths 1 Application(s) Topical daily  cilostazol 100 milliGRAM(s) Oral two times a day  folic acid 1 milliGRAM(s) Oral daily  HYDROmorphone  Injectable 1 milliGRAM(s) IV Push every 4 hours PRN  HYDROmorphone  Injectable 0.5 milliGRAM(s) IV Push every 4 hours PRN  meclizine 25 milliGRAM(s) Oral every 12 hours PRN  melatonin 3 milliGRAM(s) Oral at bedtime PRN  polyethylene glycol 3350 17 Gram(s) Oral daily PRN  senna 2 Tablet(s) Oral at bedtime PRN      PMHX/PSHX:  HTN - Hypertension    Hypercholesterolemia    PC (prostate cancer)    Gout    Aneurysm, ascending aorta    H/O prostatectomy    H/O carotid endarterectomy    H/O renal artery stenosis    Status post cataract extraction, unspecified laterality        Family history:  No pertinent family history in first degree relatives    No pertinent family history in first degree relatives        Denies family history of colon cancer/polyps, stomach cancer/polyps, pancreatic cancer/masses, liver cancer/disease, ovarian cancer and endometrial cancer.    Social History:   Tob: Denies  EtOH: Denies  Illicit Drugs: Denies    ROS:     General:  No wt loss, fevers, chills, night sweats, fatigue  Eyes:  Good vision, no reported pain  ENT:  No sore throat, pain, runny nose, dysphagia  CV:  No pain, palpitations, hypo/hypertension  Pulm:  No dyspnea, cough, tachypnea, wheezing  GI:  see HPI  :  No pain, bleeding, incontinence, nocturia  Muscle:  No pain, weakness  Neuro:  No weakness, tingling, memory problems  Psych:  No fatigue, insomnia, mood problems, depression  Endocrine:  No polyuria, polydipsia, cold/heat intolerance  Heme:  No petechiae, ecchymosis, easy bruisability  Skin:  No rash, tattoos, scars, edema    PHYSICAL EXAM:   GENERAL:  on NC   HEENT:  NCAT, no scleral icterus   CHEST:  no respiratory distress  HEART:  Regular rate and rhythm  ABDOMEN:  Soft, non-tender, non-distended   EXTREMITIES: No edema  SKIN:  No rash/erythema/ecchymoses   NEURO:  Alert and oriented x 3     Vital Signs:  Vital Signs Last 24 Hrs  T(C): 37 (2023 12:31), Max: 37.7 (2023 00:00)  T(F): 98.6 (2023 12:31), Max: 99.9 (2023 00:00)  HR: 78 (2023 12:31) (66 - 120)  BP: 123/54 (2023 12:31) (100/59 - 124/84)  BP(mean): --  RR: 16 (2023 12:31) (16 - 19)  SpO2: 95% (2023 03:00) (94% - 96%)    Parameters below as of 2023 12:31  Patient On (Oxygen Delivery Method): nasal cannula  O2 Flow (L/min): 2    Daily     Daily     LABS:                        7.1    2.00  )-----------( 122      ( 2023 06:12 )             21.9     Mean Cell Volume: 103.3 fL (-23 @ 06:12)        139  |  107  |  69<H>  ----------------------------<  124<H>  3.3<L>   |  20<L>  |  2.98<H>    Ca    8.2<L>      2023 06:12  Phos  4.1       Mg     2.60         TPro  6.1  /  Alb  3.1<L>  /  TBili  1.8<H>  /  DBili  x   /  AST  230<H>  /  ALT  292<H>  /  AlkPhos  143<H>      LIVER FUNCTIONS - ( 2023 06:12 )  Alb: 3.1 g/dL / Pro: 6.1 g/dL / ALK PHOS: 143 U/L / ALT: 292 U/L / AST: 230 U/L / GGT: x           PT/INR - ( 2023 14:00 )   PT: 17.5 sec;   INR: 1.50 ratio         PTT - ( 2023 14:00 )  PTT:19.5 sec  Urinalysis Basic - ( 2023 09:15 )    Color: Yellow / Appearance: Clear / S.018 / pH: x  Gluc: x / Ketone: Negative  / Bili: Negative / Urobili: <2 mg/dL   Blood: x / Protein: 100 mg/dL / Nitrite: Positive   Leuk Esterase: Large / RBC: 2 /HPF / WBC 63 /HPF   Sq Epi: x / Non Sq Epi: 0 /HPF / Bacteria: Negative                              7.1    2.00  )-----------( 122      ( 2023 06:12 )             21.9                         6.5    2.08  )-----------( 137      ( 2023 23:00 )             20.1                         6.3    1.94  )-----------( 143      ( 2023 14:00 )             19.3                         6.8    2.75  )-----------( 123      ( 2023 13:31 )             20.8       Imaging:

## 2023-01-28 NOTE — PROGRESS NOTE ADULT - SUBJECTIVE AND OBJECTIVE BOX
Patient seen and examined at bedside.    Overnight Events: no events, issues or complaints    Review of Systems:  REVIEW OF SYSTEMS:  CONSTITUTIONAL: No weakness, fevers or chills  EYES/ENT: No visual changes;  No dysphagia  NECK: No pain or stiffness  RESPIRATORY: No cough, wheezing, hemoptysis; No shortness of breath  CARDIOVASCULAR: No chest pain or palpitations; lower extremity edema  GASTROINTESTINAL: No abdominal or epigastric pain. No nausea, vomiting, or hematemesis; No diarrhea or constipation. No melena or hematochezia.  BACK: No back pain  GENITOURINARY: No dysuria, frequency or hematuria  NEUROLOGICAL: No numbness or weakness  SKIN: No itching, burning, rashes, or lesions   All other review of systems is negative unless indicated above.    [ ] All other systems negative  [ ] Unable to assess ROS due to    Current Meds:  acetaminophen     Tablet .. 650 milliGRAM(s) Oral every 6 hours PRN  allopurinol 100 milliGRAM(s) Oral daily  aluminum hydroxide/magnesium hydroxide/simethicone Suspension 30 milliLiter(s) Oral every 6 hours PRN  atorvastatin 40 milliGRAM(s) Oral at bedtime  calcitriol   Capsule 0.5 MICROGram(s) Oral daily  carvedilol 6.25 milliGRAM(s) Oral every 12 hours  cefepime   IVPB 1000 milliGRAM(s) IV Intermittent every 24 hours  chlorhexidine 2% Cloths 1 Application(s) Topical daily  cilostazol 100 milliGRAM(s) Oral two times a day  folic acid 1 milliGRAM(s) Oral daily  HYDROmorphone  Injectable 1 milliGRAM(s) IV Push every 4 hours PRN  HYDROmorphone  Injectable 0.5 milliGRAM(s) IV Push every 4 hours PRN  meclizine 25 milliGRAM(s) Oral every 12 hours PRN  melatonin 3 milliGRAM(s) Oral at bedtime PRN  polyethylene glycol 3350 17 Gram(s) Oral daily PRN  potassium chloride    Tablet ER 40 milliEquivalent(s) Oral once  senna 2 Tablet(s) Oral at bedtime PRN      PAST MEDICAL & SURGICAL HISTORY:  HTN - Hypertension      Hypercholesterolemia      PC (prostate cancer)  s/p surgical resection 3 years ago      Gout      Aneurysm, ascending aorta      H/O prostatectomy      H/O carotid endarterectomy      H/O renal artery stenosis  s/p stent in Left RA      Status post cataract extraction, unspecified laterality          Vitals:  T(F): 99 (), Max: 99.9 ()  HR: 83 () (66 - 127)  BP: 111/70 () (100/57 - 124/84)  RR: 19 ()  SpO2: 95% ()  I&O's Summary    2023 07:  -  2023 07:00  --------------------------------------------------------  IN: 850 mL / OUT: 400 mL / NET: 450 mL        Physical Exam:  Appearance: No acute distress; well appearing  Eyes: PERRL, EOMI, pink conjunctiva  HENT: Normal oral mucosa  Cardiovascular: irregularly irregular, S1, S2, 4/6 ezio-decres murmur at AV position; 1+ edema b/l to knees; JVD to mid neck  Respiratory: Crackles at bases  Gastrointestinal: soft, non-tender, non-distended with normal bowel sounds  Musculoskeletal: No clubbing; no joint deformity   Neurologic: Non-focal  Lymphatic: No lymphadenopathy  Psychiatry: AAOx3, mood & affect appropriate  Skin: No rashes, ecchymoses, or cyanosis                          7.1    2.00  )-----------( 122      ( 2023 06:12 )             21.9         139  |  107  |  69<H>  ----------------------------<  124<H>  3.3<L>   |  20<L>  |  2.98<H>    Ca    8.2<L>      2023 06:12  Phos  4.1       Mg     2.60         TPro  6.1  /  Alb  3.1<L>  /  TBili  1.8<H>  /  DBili  x   /  AST  230<H>  /  ALT  292<H>  /  AlkPhos  143<H>      PT/INR - ( 2023 14:00 )   PT: 17.5 sec;   INR: 1.50 ratio         PTT - ( 2023 14:00 )  PTT:19.5 sec      Serum Pro-Brain Natriuretic Peptide: 51905 pg/mL ( @ 06:12)  Serum Pro-Brain Natriuretic Peptide: 77435 pg/mL ( @ 14:00)    Total Cholesterol: 63  LDL: --  HDL: 10  T           Patient seen and examined at bedside.    Overnight Events: no events, issues or complaints  Tele: brief episodes of atrial tach (<10 seconds)    Review of Systems:  REVIEW OF SYSTEMS:  CONSTITUTIONAL: No weakness, fevers or chills  EYES/ENT: No visual changes;  No dysphagia  NECK: No pain or stiffness  RESPIRATORY: No cough, wheezing, hemoptysis; No shortness of breath  CARDIOVASCULAR: No chest pain or palpitations; lower extremity edema  GASTROINTESTINAL: No abdominal or epigastric pain. No nausea, vomiting, or hematemesis; No diarrhea or constipation. No melena or hematochezia.  BACK: No back pain  GENITOURINARY: No dysuria, frequency or hematuria  NEUROLOGICAL: No numbness or weakness  SKIN: No itching, burning, rashes, or lesions   All other review of systems is negative unless indicated above.    [ ] All other systems negative  [ ] Unable to assess ROS due to    Current Meds:  acetaminophen     Tablet .. 650 milliGRAM(s) Oral every 6 hours PRN  allopurinol 100 milliGRAM(s) Oral daily  aluminum hydroxide/magnesium hydroxide/simethicone Suspension 30 milliLiter(s) Oral every 6 hours PRN  atorvastatin 40 milliGRAM(s) Oral at bedtime  calcitriol   Capsule 0.5 MICROGram(s) Oral daily  carvedilol 6.25 milliGRAM(s) Oral every 12 hours  cefepime   IVPB 1000 milliGRAM(s) IV Intermittent every 24 hours  chlorhexidine 2% Cloths 1 Application(s) Topical daily  cilostazol 100 milliGRAM(s) Oral two times a day  folic acid 1 milliGRAM(s) Oral daily  HYDROmorphone  Injectable 1 milliGRAM(s) IV Push every 4 hours PRN  HYDROmorphone  Injectable 0.5 milliGRAM(s) IV Push every 4 hours PRN  meclizine 25 milliGRAM(s) Oral every 12 hours PRN  melatonin 3 milliGRAM(s) Oral at bedtime PRN  polyethylene glycol 3350 17 Gram(s) Oral daily PRN  potassium chloride    Tablet ER 40 milliEquivalent(s) Oral once  senna 2 Tablet(s) Oral at bedtime PRN      PAST MEDICAL & SURGICAL HISTORY:  HTN - Hypertension      Hypercholesterolemia      PC (prostate cancer)  s/p surgical resection 3 years ago      Gout      Aneurysm, ascending aorta      H/O prostatectomy      H/O carotid endarterectomy      H/O renal artery stenosis  s/p stent in Left RA      Status post cataract extraction, unspecified laterality          Vitals:  T(F): 99 (), Max: 99.9 ()  HR: 83 () (66 - 127)  BP: 111/70 () (100/57 - 124/84)  RR: 19 ()  SpO2: 95% ()  I&O's Summary    2023 07:  -  2023 07:00  --------------------------------------------------------  IN: 850 mL / OUT: 400 mL / NET: 450 mL        Physical Exam:  Appearance: No acute distress; well appearing  Eyes: PERRL, EOMI, pink conjunctiva  HENT: Normal oral mucosa  Cardiovascular:, S1, S2, 4/6 ezio-decres murmur at AV position; 1+ edema b/l to knees; JVD to mid neck  Respiratory: Crackles at bases  Gastrointestinal: soft, non-tender, non-distended with normal bowel sounds  Musculoskeletal: No clubbing; no joint deformity   Neurologic: Non-focal  Lymphatic: No lymphadenopathy  Psychiatry: AAOx3, mood & affect appropriate  Skin: No rashes, ecchymoses, or cyanosis                          7.1    2.00  )-----------( 122      ( 2023 06:12 )             21.9         139  |  107  |  69<H>  ----------------------------<  124<H>  3.3<L>   |  20<L>  |  2.98<H>    Ca    8.2<L>      2023 06:12  Phos  4.1       Mg     2.60         TPro  6.1  /  Alb  3.1<L>  /  TBili  1.8<H>  /  DBili  x   /  AST  230<H>  /  ALT  292<H>  /  AlkPhos  143<H>      PT/INR - ( 2023 14:00 )   PT: 17.5 sec;   INR: 1.50 ratio         PTT - ( 2023 14:00 )  PTT:19.5 sec      Serum Pro-Brain Natriuretic Peptide: 89649 pg/mL ( @ 06:12)  Serum Pro-Brain Natriuretic Peptide: 28274 pg/mL ( @ 14:00)    Total Cholesterol: 63  LDL: --  HDL: 10  T

## 2023-01-28 NOTE — PROGRESS NOTE ADULT - SUBJECTIVE AND OBJECTIVE BOX
NEPHROLOGY     Patient seen and examined.    MEDICATIONS  (STANDING):  allopurinol 100 milliGRAM(s) Oral daily  atorvastatin 40 milliGRAM(s) Oral at bedtime  calcitriol   Capsule 0.5 MICROGram(s) Oral daily  carvedilol 6.25 milliGRAM(s) Oral every 12 hours  cefepime   IVPB 1000 milliGRAM(s) IV Intermittent every 24 hours  chlorhexidine 2% Cloths 1 Application(s) Topical daily  cilostazol 100 milliGRAM(s) Oral two times a day  folic acid 1 milliGRAM(s) Oral daily  potassium chloride    Tablet ER 40 milliEquivalent(s) Oral once    VITALS:  T(C): , Max: 37.7 (01-27-23 @ 10:20)  T(F): , Max: 99.9 (01-27-23 @ 10:20)  HR: 83 (01-28-23 @ 03:00)  BP: 111/70 (01-28-23 @ 03:00)  RR: 19 (01-28-23 @ 03:00)  SpO2: 95% (01-28-23 @ 03:00)    I and O's:    01-27 @ 07:01 - 01-28 @ 07:00  --------------------------------------------------------  IN: 850 mL / OUT: 400 mL / NET: 450 mL    PHYSICAL EXAM:  Constitutional: NAD, Alert  HEENT: NCAT, DMM  Neck: Supple, No JVD  Respiratory: CTA-b/l  Cardiovascular: irreg s1s2  Gastrointestinal: BS+, soft, NT/ND  Extremities: 1+ b/l LE edema  Neurological: no focal deficits; strength grossly intact  Back: no CVAT b/l  Skin: (+)pallor    LABS:                        6.5    2.08  )-----------( 137      ( 27 Jan 2023 23:00 )             20.1     01-28    139  |  107  |  69<H>  ----------------------------<  124<H>  3.3<L>   |  20<L>  |  2.98<H>    Ca    8.2<L>      28 Jan 2023 06:12  Phos  4.1     01-28  Mg     2.60     01-28    TPro  6.1  /  Alb  3.1<L>  /  TBili  1.8<H>  /  DBili  x   /  AST  230<H>  /  ALT  292<H>  /  AlkPhos  143<H>  01-28    RADIOLOGY & ADDITIONAL STUDIES:    ACC: [id number] EXAM:  XR CHEST AP OR PA 1V   ORDERED BY: EYAD CLEMENTS     PROCEDURE DATE:  01/27/2023          INTERPRETATION:  EXAMINATION: XR CHEST    CLINICAL INDICATION: Elevated HR    TECHNIQUE: Single frontal view of the chest was obtained.    COMPARISON: Chest x-ray 12/26/2021.    IMPRESSION:  Generalized hazy indistinct increased lung markings with slightly more   localized confluent hazy opacity in medial right lung base suggesting   degree of congestion with edema. No pleural effusions or pneumothorax.    Heart size and mediastinal width inaccurately assessed on the projection.   Scant aortic arch calcifications.    Trachea midline.    Mild spinal degenerative changes.    --- End of Report ---    PATRICA WIGGINS MD; Resident Radiologist  This document has been electronically signed.  ALLA LYON MD; Attending Radiologist  This document has been electronically signed. Jan 28 2023  8:33AM   NEPHROLOGY     Patient seen and examined resting comfortably, no complaints, denies pain or sob, comfortable on 2L NC, in no acute distress.     MEDICATIONS  (STANDING):  allopurinol 100 milliGRAM(s) Oral daily  atorvastatin 40 milliGRAM(s) Oral at bedtime  calcitriol   Capsule 0.5 MICROGram(s) Oral daily  carvedilol 6.25 milliGRAM(s) Oral every 12 hours  cefepime   IVPB 1000 milliGRAM(s) IV Intermittent every 24 hours  chlorhexidine 2% Cloths 1 Application(s) Topical daily  cilostazol 100 milliGRAM(s) Oral two times a day  folic acid 1 milliGRAM(s) Oral daily  potassium chloride    Tablet ER 40 milliEquivalent(s) Oral once    VITALS:  T(C): , Max: 37.7 (01-27-23 @ 10:20)  T(F): , Max: 99.9 (01-27-23 @ 10:20)  HR: 83 (01-28-23 @ 03:00)  BP: 111/70 (01-28-23 @ 03:00)  RR: 19 (01-28-23 @ 03:00)  SpO2: 95% (01-28-23 @ 03:00)    I and O's:    01-27 @ 07:01  -  01-28 @ 07:00  --------------------------------------------------------  IN: 850 mL / OUT: 400 mL / NET: 450 mL    PHYSICAL EXAM:  Constitutional: NAD, Alert  HEENT: NCAT, DMM  Neck: Supple, No JVD  Respiratory: CTA-b/l  Cardiovascular: irreg s1s2  Gastrointestinal: BS+, soft, NT/ND  Extremities: 1+ b/l LE edema  Neurological: no focal deficits; strength grossly intact  Back: no CVAT b/l  Skin: (+)pallor    LABS:                        6.5    2.08  )-----------( 137      ( 27 Jan 2023 23:00 )             20.1     01-28    139  |  107  |  69<H>  ----------------------------<  124<H>  3.3<L>   |  20<L>  |  2.98<H>    Ca    8.2<L>      28 Jan 2023 06:12  Phos  4.1     01-28  Mg     2.60     01-28    TPro  6.1  /  Alb  3.1<L>  /  TBili  1.8<H>  /  DBili  x   /  AST  230<H>  /  ALT  292<H>  /  AlkPhos  143<H>  01-28    RADIOLOGY & ADDITIONAL STUDIES:    ACC: [id number] EXAM:  XR CHEST AP OR PA 1V   ORDERED BY: EYAD CLEMENTS     PROCEDURE DATE:  01/27/2023          INTERPRETATION:  EXAMINATION: XR CHEST    CLINICAL INDICATION: Elevated HR    TECHNIQUE: Single frontal view of the chest was obtained.    COMPARISON: Chest x-ray 12/26/2021.    IMPRESSION:  Generalized hazy indistinct increased lung markings with slightly more   localized confluent hazy opacity in medial right lung base suggesting   degree of congestion with edema. No pleural effusions or pneumothorax.    Heart size and mediastinal width inaccurately assessed on the projection.   Scant aortic arch calcifications.    Trachea midline.    Mild spinal degenerative changes.    --- End of Report ---    PATRICA WIGGINS MD; Resident Radiologist  This document has been electronically signed.  ALLA LYON MD; Attending Radiologist  This document has been electronically signed. Jan 28 2023  8:33AM   [Cervical Pap Smear] : cervical Pap smear [Liquid Base] : liquid base [GC & Chlamydia via Pap] : GC & Chlamydia via Pap [Tolerated Well] : the patient tolerated the procedure well [No Complications] : there were no complications

## 2023-01-28 NOTE — PROGRESS NOTE ADULT - PROBLEM SELECTOR PLAN 9
- on clear liquid diet presently due to suspected GIB (FOBT positive0  - would benefit from Nutrition Consult  - may benefit from appetite stimulating medication  - - on clear liquid diet presently due to suspected GIB (FOBT positive0  - would benefit from Nutrition Consult  - may benefit from appetite stimulating medication

## 2023-01-28 NOTE — CONSULT NOTE ADULT - ASSESSMENT
81 yo M, w/ PMHx significant for MDS, Stage II diastolic dysfunction, HTN, HLD, Prostate cancer - s/p Prostatectomy, Carotid artery stenosis - s/p Endarterectomy, PAD, Renal artery stenosis, and CKD, and presented to the ED, after being sent by outpatient rheumatologist, secondary to new onset atrial fibrillation. Pt found to have pancytopenia, elevated TBili/transaminases, JUAN RAMON. GI consulted for anemia without overt bleeding.    #Pancytopenia: Pt denies any hematochezia, melena, BRBPR. Hgb 6.3 on admission, s/p 2U pRBC with inadequate response. Pancytopenia likely in setting of his MDS, low likelihood for underlying GI bleed  #MDS: On revlimid and allopurinol as outpatient  #Elevated Tbili, transaminases: may be 2/2 DILI (revlimid can cause toxicity in cholestatic pattern) in background of underlying existing hepatic steatosis (seen on past US)  #Elevated pro-BNP  #Elevated HST  #JUAN RAMON    Recommendations  - patient without evidence of GI bleeding at this time. Endoscopic intervention not indicated at this time given a likely alternate etiology (MDS) and his current acute illness  - RUQ US w/ doppler as work   - trend CBC, CMP  - appreciate hematology recs    All recommendations are tentative until note is attested by attending.     Brittney Drake, PGY5  Gastroenterology/Hepatology Fellow  Available on [employer] Teams  [phone] ([employer] Short Range Pager)  [phone] (Long Range Pager)    After 5pm, please contact the on-call GI fellow. [phone]

## 2023-01-29 DIAGNOSIS — M10.9 GOUT, UNSPECIFIED: ICD-10-CM

## 2023-01-29 LAB
ALBUMIN SERPL ELPH-MCNC: 3.4 G/DL — SIGNIFICANT CHANGE UP (ref 3.3–5)
ALP SERPL-CCNC: 148 U/L — HIGH (ref 40–120)
ALT FLD-CCNC: 266 U/L — HIGH (ref 4–41)
ANION GAP SERPL CALC-SCNC: 14 MMOL/L — SIGNIFICANT CHANGE UP (ref 7–14)
AST SERPL-CCNC: 101 U/L — HIGH (ref 4–40)
BASOPHILS # BLD AUTO: 0.04 K/UL — SIGNIFICANT CHANGE UP (ref 0–0.2)
BASOPHILS NFR BLD AUTO: 1.1 % — SIGNIFICANT CHANGE UP (ref 0–2)
BILIRUB SERPL-MCNC: 1.8 MG/DL — HIGH (ref 0.2–1.2)
BUN SERPL-MCNC: 65 MG/DL — HIGH (ref 7–23)
CALCIUM SERPL-MCNC: 8.6 MG/DL — SIGNIFICANT CHANGE UP (ref 8.4–10.5)
CHLORIDE SERPL-SCNC: 105 MMOL/L — SIGNIFICANT CHANGE UP (ref 98–107)
CO2 SERPL-SCNC: 20 MMOL/L — LOW (ref 22–31)
CREAT SERPL-MCNC: 2.76 MG/DL — HIGH (ref 0.5–1.3)
EGFR: 22 ML/MIN/1.73M2 — LOW
EOSINOPHIL # BLD AUTO: 0.03 K/UL — SIGNIFICANT CHANGE UP (ref 0–0.5)
EOSINOPHIL NFR BLD AUTO: 0.8 % — SIGNIFICANT CHANGE UP (ref 0–6)
GLUCOSE SERPL-MCNC: 242 MG/DL — HIGH (ref 70–99)
HCT VFR BLD CALC: 24.9 % — LOW (ref 39–50)
HGB BLD-MCNC: 8 G/DL — LOW (ref 13–17)
IANC: 2.91 K/UL — SIGNIFICANT CHANGE UP (ref 1.8–7.4)
IMM GRANULOCYTES NFR BLD AUTO: 2.2 % — HIGH (ref 0–0.9)
LYMPHOCYTES # BLD AUTO: 0.35 K/UL — LOW (ref 1–3.3)
LYMPHOCYTES # BLD AUTO: 9.7 % — LOW (ref 13–44)
MAGNESIUM SERPL-MCNC: 2.7 MG/DL — HIGH (ref 1.6–2.6)
MCHC RBC-ENTMCNC: 32.1 GM/DL — SIGNIFICANT CHANGE UP (ref 32–36)
MCHC RBC-ENTMCNC: 34 PG — SIGNIFICANT CHANGE UP (ref 27–34)
MCV RBC AUTO: 106 FL — HIGH (ref 80–100)
MONOCYTES # BLD AUTO: 0.18 K/UL — SIGNIFICANT CHANGE UP (ref 0–0.9)
MONOCYTES NFR BLD AUTO: 5 % — SIGNIFICANT CHANGE UP (ref 2–14)
NEUTROPHILS # BLD AUTO: 2.91 K/UL — SIGNIFICANT CHANGE UP (ref 1.8–7.4)
NEUTROPHILS NFR BLD AUTO: 81.2 % — HIGH (ref 43–77)
NRBC # BLD: 0 /100 WBCS — SIGNIFICANT CHANGE UP (ref 0–0)
NRBC # FLD: 0 K/UL — SIGNIFICANT CHANGE UP (ref 0–0)
PHOSPHATE SERPL-MCNC: 4.3 MG/DL — SIGNIFICANT CHANGE UP (ref 2.5–4.5)
PLATELET # BLD AUTO: 112 K/UL — LOW (ref 150–400)
POTASSIUM SERPL-MCNC: 4.1 MMOL/L — SIGNIFICANT CHANGE UP (ref 3.5–5.3)
POTASSIUM SERPL-SCNC: 4.1 MMOL/L — SIGNIFICANT CHANGE UP (ref 3.5–5.3)
PROT SERPL-MCNC: 6.5 G/DL — SIGNIFICANT CHANGE UP (ref 6–8.3)
RBC # BLD: 2.35 M/UL — LOW (ref 4.2–5.8)
RBC # FLD: SIGNIFICANT CHANGE UP (ref 10.3–14.5)
SODIUM SERPL-SCNC: 139 MMOL/L — SIGNIFICANT CHANGE UP (ref 135–145)
URATE SERPL-MCNC: 8.4 MG/DL — SIGNIFICANT CHANGE UP (ref 3.4–8.8)
WBC # BLD: 3.59 K/UL — LOW (ref 3.8–10.5)
WBC # FLD AUTO: 3.59 K/UL — LOW (ref 3.8–10.5)

## 2023-01-29 PROCEDURE — 99232 SBSQ HOSP IP/OBS MODERATE 35: CPT | Mod: GC

## 2023-01-29 PROCEDURE — 73630 X-RAY EXAM OF FOOT: CPT | Mod: 26,RT

## 2023-01-29 PROCEDURE — 76700 US EXAM ABDOM COMPLETE: CPT | Mod: 26

## 2023-01-29 PROCEDURE — 99233 SBSQ HOSP IP/OBS HIGH 50: CPT

## 2023-01-29 RX ORDER — PANTOPRAZOLE SODIUM 20 MG/1
40 TABLET, DELAYED RELEASE ORAL DAILY
Refills: 0 | Status: DISCONTINUED | OUTPATIENT
Start: 2023-01-29 | End: 2023-02-07

## 2023-01-29 RX ORDER — CHOLECALCIFEROL (VITAMIN D3) 125 MCG
2000 CAPSULE ORAL DAILY
Refills: 0 | Status: DISCONTINUED | OUTPATIENT
Start: 2023-01-29 | End: 2023-02-07

## 2023-01-29 RX ADMIN — CHLORHEXIDINE GLUCONATE 1 APPLICATION(S): 213 SOLUTION TOPICAL at 14:11

## 2023-01-29 RX ADMIN — Medication 3 MILLIGRAM(S): at 22:57

## 2023-01-29 RX ADMIN — Medication 30 MILLIGRAM(S): at 05:16

## 2023-01-29 RX ADMIN — PANTOPRAZOLE SODIUM 40 MILLIGRAM(S): 20 TABLET, DELAYED RELEASE ORAL at 14:12

## 2023-01-29 RX ADMIN — Medication 100 MILLIGRAM(S): at 14:10

## 2023-01-29 RX ADMIN — CALCITRIOL 0.5 MICROGRAM(S): 0.5 CAPSULE ORAL at 14:10

## 2023-01-29 RX ADMIN — Medication 1 MILLIGRAM(S): at 14:10

## 2023-01-29 RX ADMIN — ATORVASTATIN CALCIUM 40 MILLIGRAM(S): 80 TABLET, FILM COATED ORAL at 21:59

## 2023-01-29 RX ADMIN — CILOSTAZOL 100 MILLIGRAM(S): 100 TABLET ORAL at 05:15

## 2023-01-29 RX ADMIN — CARVEDILOL PHOSPHATE 6.25 MILLIGRAM(S): 80 CAPSULE, EXTENDED RELEASE ORAL at 18:23

## 2023-01-29 RX ADMIN — CARVEDILOL PHOSPHATE 6.25 MILLIGRAM(S): 80 CAPSULE, EXTENDED RELEASE ORAL at 05:15

## 2023-01-29 RX ADMIN — CILOSTAZOL 100 MILLIGRAM(S): 100 TABLET ORAL at 18:23

## 2023-01-29 NOTE — PROGRESS NOTE ADULT - PROBLEM SELECTOR PLAN 2
Rt great toe with pain, erythema and edema  - will give steroids as pt with gib and unable to give colchicine due to elevated creatinine  - cont steroids, ppi ppx  - blood cultures neg to date Rt great toe with pain, erythema and edema  - will give steroids as pt with gib and unable to give colchicine due to elevated creatinine  - cont steroids, ppi ppx  - blood cultures neg to date  - f/u rt foot xray

## 2023-01-29 NOTE — PROGRESS NOTE ADULT - PROBLEM SELECTOR PLAN 9
- history of NAFLD  - has mild tenderness over the RUQ area  - T-bili = 1.5, ALP = 138, AST = 213, ALT = 195; elevations likely impacted by current medical state  - f/u abdominal ultrasound showed Cholelithiasis withoutultrasound evidence of acute cholecystitis. Right renal atrophy, unchanged. Small right pleural effusion..  - trend liver function in the Am

## 2023-01-29 NOTE — PROGRESS NOTE ADULT - ASSESSMENT
83 yo M, w/ PMHx significant for MDS, Stage II diastolic dysfunction, HTN, HLD, Prostate cancer - s/p Prostatectomy, Carotid artery stenosis - s/p Endarterectomy, PAD, Renal artery stenosis, and CKD, and presented to the ED, after being sent by outpatient rheumatologist, secondary to new onset atrial fibrillation. Pt found to have pancytopenia, elevated TBili/transaminases, JUAN RAMON. GI consulted for anemia without overt bleeding.    #Pancytopenia: Pt denies any hematochezia, melena, BRBPR. Hgb 6.3 on admission, s/p 2U pRBC with inadequate response. Pancytopenia likely in setting of his MDS, low likelihood for underlying GI bleed  #MDS: On revlimid and allopurinol as outpatient  #Elevated Tbili, transaminases: may be 2/2 DILI (revlimid can cause toxicity in cholestatic pattern) in background of underlying existing hepatic steatosis (seen on past US)  #Elevated pro-BNP  #Elevated HST  #JUAN RAMON  #New afib    Recommendations  - patient without evidence of GI bleeding at this time   - RUQ US w/ doppler    - trend CBC, CMP  - can consider endoscopy to evaluate drop in Hgb when patient is medically optimized    All recommendations are tentative until note is attested by attending.     Brittney Drake, PGY5  Gastroenterology/Hepatology Fellow  Available on [employer] Teams  [phone] ([employer] Short Range Pager)  [phone] (Long Range Pager)    After 5pm, please contact the on-call GI fellow. [phone]

## 2023-01-29 NOTE — PROGRESS NOTE ADULT - PROBLEM SELECTOR PLAN 4
- troponin = 416 --> 411.  Pro-BNP = 80064  - ECG = Sinus rhythm w/ occasional PVCs and PACs at 99 bpm, QTc = 503  - [id number] score = 5  - sent in for evaluation/management of same of new onset a-fib  - A-fib likely of multifactorial etiology; infection, dehydration, anemia.  Unlikely CHF (pro-BNP unlikely of cardiac origin)  - receiving antibiotic, being hydrated and being transfused p-RBCs  - has ~ II/VI systolic murmur (pronounced over aortic and mitral valve areas)  - being followed by Cardiology team (appreciated)  - continuing with reduced dose of carvedilol (from 25 mg to 6.25 mg), (reduced due to borderline low blood pressure)  - continuing w/ statin  - aspirin and Plavix on hold presently (pls d/w Cardiology team re restarting same)  - f/u TTE (ordered)

## 2023-01-29 NOTE — PROGRESS NOTE ADULT - SUBJECTIVE AND OBJECTIVE BOX
Gastroenterology/Hepatology Progress Note      Interval Events:     Allergies:  No Known Allergies      Hospital Medications:  acetaminophen     Tablet .. 650 milliGRAM(s) Oral every 6 hours PRN  allopurinol 100 milliGRAM(s) Oral daily  aluminum hydroxide/magnesium hydroxide/simethicone Suspension 30 milliLiter(s) Oral every 6 hours PRN  atorvastatin 40 milliGRAM(s) Oral at bedtime  calcitriol   Capsule 0.5 MICROGram(s) Oral daily  carvedilol 6.25 milliGRAM(s) Oral every 12 hours  chlorhexidine 2% Cloths 1 Application(s) Topical daily  cilostazol 100 milliGRAM(s) Oral two times a day  folic acid 1 milliGRAM(s) Oral daily  HYDROmorphone  Injectable 1 milliGRAM(s) IV Push every 4 hours PRN  HYDROmorphone  Injectable 0.5 milliGRAM(s) IV Push every 4 hours PRN  meclizine 25 milliGRAM(s) Oral every 12 hours PRN  melatonin 3 milliGRAM(s) Oral at bedtime PRN  pantoprazole  Injectable 40 milliGRAM(s) IV Push daily  polyethylene glycol 3350 17 Gram(s) Oral daily PRN  predniSONE   Tablet 30 milliGRAM(s) Oral daily  senna 2 Tablet(s) Oral at bedtime PRN      ROS: 14 point ROS negative unless otherwise state in subjective    PHYSICAL EXAM:   Vital Signs:  Vital Signs Last 24 Hrs  T(C): 36.6 (2023 10:48), Max: 37.2 (2023 20:00)  T(F): 97.8 (2023 10:48), Max: 99 (2023 20:00)  HR: 72 (2023 10:48) (72 - 89)  BP: 117/55 (2023 10:48) (110/47 - 138/73)  BP(mean): --  RR: 17 (2023 10:48) (17 - 17)  SpO2: 95% (2023 10:48) (95% - 97%)    Parameters below as of 2023 10:48  Patient On (Oxygen Delivery Method): nasal cannula  O2 Flow (L/min): 2    Daily     Daily     GENERAL:  No acute distress  HEENT:  NCAT, no scleral icterus  CHEST: no resp distress  HEART:  RRR  ABDOMEN:  Soft, non-tender, non-distended, normoactive bowel sounds, no masses  EXTREMITIES:  No cyanosis, clubbing, or edema  SKIN:  No rash/erythema/ecchymoses/petechiae/wounds/abscess/warm/dry  NEURO:  Alert and oriented x 3, no asterixis, no tremor    LABS:                        8.0    3.59  )-----------( 112      ( 2023 05:48 )             24.9     Mean Cell Volume: 106.0 fL (23 @ 05:48)        139  |  105  |  65<H>  ----------------------------<  242<H>  4.1   |  20<L>  |  2.76<H>    Ca    8.6      2023 05:48  Phos  4.3       Mg     2.70         TPro  6.5  /  Alb  3.4  /  TBili  1.8<H>  /  DBili  x   /  AST  101<H>  /  ALT  266<H>  /  AlkPhos  148<H>      LIVER FUNCTIONS - ( 2023 05:48 )  Alb: 3.4 g/dL / Pro: 6.5 g/dL / ALK PHOS: 148 U/L / ALT: 266 U/L / AST: 101 U/L / GGT: x             Urinalysis Basic - ( 2023 09:15 )    Color: Yellow / Appearance: Clear / S.018 / pH: x  Gluc: x / Ketone: Negative  / Bili: Negative / Urobili: <2 mg/dL   Blood: x / Protein: 100 mg/dL / Nitrite: Positive   Leuk Esterase: Large / RBC: 2 /HPF / WBC 63 /HPF   Sq Epi: x / Non Sq Epi: 0 /HPF / Bacteria: Negative            Imaging:             Gastroenterology/Hepatology Progress Note    Interval Events: No events overnight, no GI bleeding    Allergies:  No Known Allergies      Hospital Medications:  acetaminophen     Tablet .. 650 milliGRAM(s) Oral every 6 hours PRN  allopurinol 100 milliGRAM(s) Oral daily  aluminum hydroxide/magnesium hydroxide/simethicone Suspension 30 milliLiter(s) Oral every 6 hours PRN  atorvastatin 40 milliGRAM(s) Oral at bedtime  calcitriol   Capsule 0.5 MICROGram(s) Oral daily  carvedilol 6.25 milliGRAM(s) Oral every 12 hours  chlorhexidine 2% Cloths 1 Application(s) Topical daily  cilostazol 100 milliGRAM(s) Oral two times a day  folic acid 1 milliGRAM(s) Oral daily  HYDROmorphone  Injectable 1 milliGRAM(s) IV Push every 4 hours PRN  HYDROmorphone  Injectable 0.5 milliGRAM(s) IV Push every 4 hours PRN  meclizine 25 milliGRAM(s) Oral every 12 hours PRN  melatonin 3 milliGRAM(s) Oral at bedtime PRN  pantoprazole  Injectable 40 milliGRAM(s) IV Push daily  polyethylene glycol 3350 17 Gram(s) Oral daily PRN  predniSONE   Tablet 30 milliGRAM(s) Oral daily  senna 2 Tablet(s) Oral at bedtime PRN      ROS: 14 point ROS negative unless otherwise state in subjective    PHYSICAL EXAM:   Vital Signs:  Vital Signs Last 24 Hrs  T(C): 36.6 (2023 10:48), Max: 37.2 (2023 20:00)  T(F): 97.8 (2023 10:48), Max: 99 (2023 20:00)  HR: 72 (2023 10:48) (72 - 89)  BP: 117/55 (2023 10:48) (110/47 - 138/73)  BP(mean): --  RR: 17 (2023 10:48) (17 - 17)  SpO2: 95% (2023 10:48) (95% - 97%)    Parameters below as of 2023 10:48  Patient On (Oxygen Delivery Method): nasal cannula  O2 Flow (L/min): 2    Daily     Daily     GENERAL:  No acute distress  HEENT:  NCAT, no scleral icterus  CHEST: no resp distress  HEART:  RRR  ABDOMEN:  Soft, non-tender, non-distended   EXTREMITIES:  No cyanosis, clubbing, or edema  SKIN:  No rash/erythema/ecchymoses   NEURO:  Alert and oriented x 3     LABS:                        8.0    3.59  )-----------( 112      ( 2023 05:48 )             24.9     Mean Cell Volume: 106.0 fL (23 @ 05:48)        139  |  105  |  65<H>  ----------------------------<  242<H>  4.1   |  20<L>  |  2.76<H>    Ca    8.6      2023 05:48  Phos  4.3       Mg     2.70         TPro  6.5  /  Alb  3.4  /  TBili  1.8<H>  /  DBili  x   /  AST  101<H>  /  ALT  266<H>  /  AlkPhos  148<H>      LIVER FUNCTIONS - ( 2023 05:48 )  Alb: 3.4 g/dL / Pro: 6.5 g/dL / ALK PHOS: 148 U/L / ALT: 266 U/L / AST: 101 U/L / GGT: x             Urinalysis Basic - ( 2023 09:15 )    Color: Yellow / Appearance: Clear / S.018 / pH: x  Gluc: x / Ketone: Negative  / Bili: Negative / Urobili: <2 mg/dL   Blood: x / Protein: 100 mg/dL / Nitrite: Positive   Leuk Esterase: Large / RBC: 2 /HPF / WBC 63 /HPF   Sq Epi: x / Non Sq Epi: 0 /HPF / Bacteria: Negative            Imaging:

## 2023-01-29 NOTE — PROGRESS NOTE ADULT - PROBLEM SELECTOR PLAN 1
- macrocytic.  Hgb = 6.3, MCV = 110.9, in the context of MDS  - presented for blood transfusion as outpatient and sent in due to new onset A-fib  - FOBT positive  - initially tachycardic to 122 in the ED  - s/p 2 units of p-RBCs  - anemia is likely of multifactorial etiology; chronic disease, blood loss, dietary deficiency  - ferritin elevated  - GI consulted and follg, per gi to start on regular diet, cont ppi  - monitor H&H and tranfuse prn if hb<7.5, rpt hb if less <7.5 transfuse prbc  - folic acid started  - Hem/Onc consulted, per heme hold revlimid  - check abd sono with doppler - macrocytic.  Hgb = 6.3, MCV = 110.9, in the context of MDS  - presented for blood transfusion as outpatient and sent in due to new onset A-fib  - FOBT positive  - initially tachycardic to 122 in the ED  - s/p 2 units of p-RBCs  - anemia is likely of multifactorial etiology; chronic disease, blood loss, dietary deficiency  - ferritin elevated  - GI consulted and follg, per gi to start on regular diet, cont ppi  - monitor H&H and tranfuse prn if hb<7.5, rpt hb if less <7.5 transfuse prbc  - folic acid started  - Hem/Onc consulted, per heme hold revlimid

## 2023-01-29 NOTE — PROGRESS NOTE ADULT - SUBJECTIVE AND OBJECTIVE BOX
Patient is a 82y old  Male who presents with a chief complaint of Anemia (2023 14:55)      SUBJECTIVE / OVERNIGHT EVENTS: Pt seen and examined at 11:45am, no overnight events, denies any bleeding, reports improvement in his b/l leg pain and rt toe pain, no other new issues reported.    MEDICATIONS  (STANDING):  allopurinol 100 milliGRAM(s) Oral daily  atorvastatin 40 milliGRAM(s) Oral at bedtime  calcitriol   Capsule 0.5 MICROGram(s) Oral daily  carvedilol 6.25 milliGRAM(s) Oral every 12 hours  chlorhexidine 2% Cloths 1 Application(s) Topical daily  cilostazol 100 milliGRAM(s) Oral two times a day  folic acid 1 milliGRAM(s) Oral daily  pantoprazole  Injectable 40 milliGRAM(s) IV Push daily  predniSONE   Tablet 30 milliGRAM(s) Oral daily    MEDICATIONS  (PRN):  acetaminophen     Tablet .. 650 milliGRAM(s) Oral every 6 hours PRN Temp greater or equal to 38C (100.4F), Mild Pain (1 - 3)  aluminum hydroxide/magnesium hydroxide/simethicone Suspension 30 milliLiter(s) Oral every 6 hours PRN Dyspepsia  HYDROmorphone  Injectable 1 milliGRAM(s) IV Push every 4 hours PRN Severe Pain (7 - 10)  HYDROmorphone  Injectable 0.5 milliGRAM(s) IV Push every 4 hours PRN Moderate Pain (4 - 6)  meclizine 25 milliGRAM(s) Oral every 12 hours PRN Dizziness  melatonin 3 milliGRAM(s) Oral at bedtime PRN Insomnia  polyethylene glycol 3350 17 Gram(s) Oral daily PRN Constipation  senna 2 Tablet(s) Oral at bedtime PRN Constipation      Vital Signs Last 24 Hrs  T(C): 36.6 (2023 10:48), Max: 37.2 (2023 20:00)  T(F): 97.8 (2023 10:48), Max: 99 (2023 20:00)  HR: 72 (2023 10:48) (72 - 89)  BP: 117/55 (2023 10:48) (110/47 - 138/73)  BP(mean): --  RR: 17 (2023 10:48) (17 - 17)  SpO2: 95% (2023 10:48) (95% - 97%)    Parameters below as of 2023 10:48  Patient On (Oxygen Delivery Method): nasal cannula  O2 Flow (L/min): 2    CAPILLARY BLOOD GLUCOSE        I&O's Summary    2023 07:01  -  2023 07:00  --------------------------------------------------------  IN: 100 mL / OUT: 150 mL / NET: -50 mL        PHYSICAL EXAM:  GENERAL: NAD, well-developed  CHEST/LUNG: Clear to auscultation bilaterally; No wheeze  HEART: Regular rate and rhythm, + LESA  ABDOMEN: Soft, Nontender, Nondistended  EXTREMITIES: + b/l LE edema, rt great toe with erythema, edema, ttp  PSYCH: Calm  NEUROLOGY: AAOx3  SKIN: No rashes or lesions      LABS:                        8.0    3.59  )-----------( 112      ( 2023 05:48 )             24.9         139  |  105  |  65<H>  ----------------------------<  242<H>  4.1   |  20<L>  |  2.76<H>    Ca    8.6      2023 05:48  Phos  4.3       Mg     2.70         TPro  6.5  /  Alb  3.4  /  TBili  1.8<H>  /  DBili  x   /  AST  101<H>  /  ALT  266<H>  /  AlkPhos  148<H>            Urinalysis Basic - ( 2023 09:15 )    Color: Yellow / Appearance: Clear / S.018 / pH: x  Gluc: x / Ketone: Negative  / Bili: Negative / Urobili: <2 mg/dL   Blood: x / Protein: 100 mg/dL / Nitrite: Positive   Leuk Esterase: Large / RBC: 2 /HPF / WBC 63 /HPF   Sq Epi: x / Non Sq Epi: 0 /HPF / Bacteria: Negative        RADIOLOGY & ADDITIONAL TESTS:  < from: US Abdomen Complete (23 @ 13:26) >   US ABDOMEN COMPLETE     < end of copied text >  < from: US Abdomen Complete (23 @ 13:26) >  Cholelithiasis withoutultrasound evidence of acute cholecystitis.    Right renal atrophy, unchanged.    Small right pleural effusion..      < end of copied text >    Imaging Personally Reviewed:    Consultant(s) Notes Reviewed:      Care Discussed with Consultants/Other Providers:

## 2023-01-29 NOTE — PROGRESS NOTE ADULT - SUBJECTIVE AND OBJECTIVE BOX
NEPHROLOGY     Patient seen and examined.    MEDICATIONS  (STANDING):  allopurinol 100 milliGRAM(s) Oral daily  atorvastatin 40 milliGRAM(s) Oral at bedtime  calcitriol   Capsule 0.5 MICROGram(s) Oral daily  carvedilol 6.25 milliGRAM(s) Oral every 12 hours  chlorhexidine 2% Cloths 1 Application(s) Topical daily  cilostazol 100 milliGRAM(s) Oral two times a day  folic acid 1 milliGRAM(s) Oral daily  pantoprazole  Injectable 40 milliGRAM(s) IV Push daily  predniSONE   Tablet 30 milliGRAM(s) Oral daily    VITALS:  T(C): , Max: 37.2 (23 @ 20:00)  T(F): , Max: 99 (23 @ 20:00)  HR: 72 (23 @ 10:48)  BP: 117/55 (23 @ 10:48)  RR: 17 (23 @ 10:48)  SpO2: 95% (23 @ 10:48)    I and O's:     @ 07:  -   @ 07:00  --------------------------------------------------------  IN: 100 mL / OUT: 150 mL / NET: -50 mL    PHYSICAL EXAM:  Constitutional: NAD, Alert  HEENT: NCAT, DMM  Neck: Supple, No JVD  Respiratory: CTA-b/l  Cardiovascular: irreg s1s2  Gastrointestinal: BS+, soft, NT/ND  Extremities: 1+ b/l LE edema  Neurological: no focal deficits; strength grossly intact  Back: no CVAT b/l  Skin: (+)pallor  LABS:                        8.0    3.59  )-----------( 112      ( 2023 05:48 )             24.9         139  |  105  |  65<H>  ----------------------------<  242<H>  4.1   |  20<L>  |  2.76<H>    Ca    8.6      2023 05:48  Phos  4.3       Mg     2.70         TPro  6.5  /  Alb  3.4  /  TBili  1.8<H>  /  DBili  x   /  AST  101<H>  /  ALT  266<H>  /  AlkPhos  148<H>      Urine Studies:  Urinalysis Basic - ( 2023 09:15 )    Color: Yellow / Appearance: Clear / S.018 / pH: x  Gluc: x / Ketone: Negative  / Bili: Negative / Urobili: <2 mg/dL   Blood: x / Protein: 100 mg/dL / Nitrite: Positive   Leuk Esterase: Large / RBC: 2 /HPF / WBC 63 /HPF   Sq Epi: x / Non Sq Epi: 0 /HPF / Bacteria: Negative     NEPHROLOGY     Patient seen and examined resting comfortably, no new complaints, no sob, comfortable on 2L NC, currently in no acute distress.     MEDICATIONS  (STANDING):  allopurinol 100 milliGRAM(s) Oral daily  atorvastatin 40 milliGRAM(s) Oral at bedtime  calcitriol   Capsule 0.5 MICROGram(s) Oral daily  carvedilol 6.25 milliGRAM(s) Oral every 12 hours  chlorhexidine 2% Cloths 1 Application(s) Topical daily  cilostazol 100 milliGRAM(s) Oral two times a day  folic acid 1 milliGRAM(s) Oral daily  pantoprazole  Injectable 40 milliGRAM(s) IV Push daily  predniSONE   Tablet 30 milliGRAM(s) Oral daily    VITALS:  T(C): , Max: 37.2 (23 @ 20:00)  T(F): , Max: 99 (23 @ 20:00)  HR: 72 (23 @ 10:48)  BP: 117/55 (23 @ 10:48)  RR: 17 (23 @ 10:48)  SpO2: 95% (23 @ 10:48)    I and O's:     @ 07:  -   @ 07:00  --------------------------------------------------------  IN: 100 mL / OUT: 150 mL / NET: -50 mL    PHYSICAL EXAM:  Constitutional: NAD, Alert  HEENT: NCAT, DMM  Neck: Supple, No JVD  Respiratory: CTA-b/l  Cardiovascular: irreg s1s2  Gastrointestinal: BS+, soft, NT/ND  Extremities: 1+ b/l LE edema  Neurological: no focal deficits; strength grossly intact  Back: no CVAT b/l  Skin: (+)pallor  LABS:                        8.0    3.59  )-----------( 112      ( 2023 05:48 )             24.9         139  |  105  |  65<H>  ----------------------------<  242<H>  4.1   |  20<L>  |  2.76<H>    Ca    8.6      2023 05:48  Phos  4.3       Mg     2.70         TPro  6.5  /  Alb  3.4  /  TBili  1.8<H>  /  DBili  x   /  AST  101<H>  /  ALT  266<H>  /  AlkPhos  148<H>      Urine Studies:  Urinalysis Basic - ( 2023 09:15 )    Color: Yellow / Appearance: Clear / S.018 / pH: x  Gluc: x / Ketone: Negative  / Bili: Negative / Urobili: <2 mg/dL   Blood: x / Protein: 100 mg/dL / Nitrite: Positive   Leuk Esterase: Large / RBC: 2 /HPF / WBC 63 /HPF   Sq Epi: x / Non Sq Epi: 0 /HPF / Bacteria: Negative

## 2023-01-29 NOTE — PROGRESS NOTE ADULT - ASSESSMENT
ASSESSMENT:  (1)CKD - stage 4, with GFR 20-30ml/min - in setting of atrophic right kidney, from [facility]  (2)JUAN RAMON - is he in JUAN RAMON/is he uremic? He appears quiet ill; creatinine trending down  (3)Anemia - hgb improved s/p 2 units PRBCs   (4)EP - rapid afib, improved  (5)CV - BP stable/ acceptable  (6)ID - s/p IV abx, afebrile   (7)R toe pain - gout flare - on po steroids     RECOMMEND:  (1)Defer IVF, continue to trend renal fxn  (2)Monitor I/Os  (3)PRBCs per primary team  (5)F/u echo  (6)continue to hold Lenalidomide  (7)Meds for GFR <15  (8)BMP, Mg, Phos     Carlie Wilson NP-C  [facility]  [phone]

## 2023-01-30 ENCOUNTER — APPOINTMENT (OUTPATIENT)
Dept: HEMATOLOGY ONCOLOGY | Facility: CLINIC | Age: 83
End: 2023-01-30

## 2023-01-30 ENCOUNTER — TRANSCRIPTION ENCOUNTER (OUTPATIENT)
Age: 83
End: 2023-01-30

## 2023-01-30 LAB
ALBUMIN SERPL ELPH-MCNC: 3 G/DL — LOW (ref 3.3–5)
ALBUMIN SERPL ELPH-MCNC: 3.7 G/DL
ALP BLD-CCNC: 134 U/L
ALP SERPL-CCNC: 132 U/L — HIGH (ref 40–120)
ALT FLD-CCNC: 255 U/L — HIGH (ref 4–41)
ALT SERPL-CCNC: 111 U/L
ANION GAP SERPL CALC-SCNC: 13 MMOL/L — SIGNIFICANT CHANGE UP (ref 7–14)
ANION GAP SERPL CALC-SCNC: 16 MMOL/L
AST SERPL-CCNC: 130 U/L — HIGH (ref 4–40)
AST SERPL-CCNC: 67 U/L
BASOPHILS # BLD AUTO: 0.04 K/UL — SIGNIFICANT CHANGE UP (ref 0–0.2)
BASOPHILS NFR BLD AUTO: 1 % — SIGNIFICANT CHANGE UP (ref 0–2)
BILIRUB SERPL-MCNC: 1.1 MG/DL
BILIRUB SERPL-MCNC: 1.2 MG/DL — SIGNIFICANT CHANGE UP (ref 0.2–1.2)
BLD GP AB SCN SERPL QL: NEGATIVE — SIGNIFICANT CHANGE UP
BUN SERPL-MCNC: 55 MG/DL
BUN SERPL-MCNC: 74 MG/DL — HIGH (ref 7–23)
CALCIUM SERPL-MCNC: 8.4 MG/DL — SIGNIFICANT CHANGE UP (ref 8.4–10.5)
CALCIUM SERPL-MCNC: 8.8 MG/DL
CHLORIDE SERPL-SCNC: 101 MMOL/L
CHLORIDE SERPL-SCNC: 104 MMOL/L — SIGNIFICANT CHANGE UP (ref 98–107)
CO2 SERPL-SCNC: 19 MMOL/L — LOW (ref 22–31)
CO2 SERPL-SCNC: 22 MMOL/L
CREAT SERPL-MCNC: 2.65 MG/DL — HIGH (ref 0.5–1.3)
CREAT SERPL-MCNC: 3.32 MG/DL
EGFR: 18 ML/MIN/1.73M2
EGFR: 23 ML/MIN/1.73M2 — LOW
EOSINOPHIL # BLD AUTO: 0.22 K/UL — SIGNIFICANT CHANGE UP (ref 0–0.5)
EOSINOPHIL NFR BLD AUTO: 5.6 % — SIGNIFICANT CHANGE UP (ref 0–6)
FERRITIN SERPL-MCNC: 1040 NG/ML
GLUCOSE SERPL-MCNC: 155 MG/DL — HIGH (ref 70–99)
GLUCOSE SERPL-MCNC: 162 MG/DL
HAPTOGLOB SERPL-MCNC: 313 MG/DL
HCT VFR BLD CALC: 22.1 % — LOW (ref 39–50)
HCT VFR BLD CALC: 25.4 % — LOW (ref 39–50)
HGB BLD-MCNC: 7.1 G/DL — LOW (ref 13–17)
HGB BLD-MCNC: 8 G/DL — LOW (ref 13–17)
IANC: 2.8 K/UL — SIGNIFICANT CHANGE UP (ref 1.8–7.4)
IMM GRANULOCYTES NFR BLD AUTO: 1.3 % — HIGH (ref 0–0.9)
LDH SERPL-CCNC: 244 U/L
LYMPHOCYTES # BLD AUTO: 0.54 K/UL — LOW (ref 1–3.3)
LYMPHOCYTES # BLD AUTO: 13.7 % — SIGNIFICANT CHANGE UP (ref 13–44)
MAGNESIUM SERPL-MCNC: 2.5 MG/DL — SIGNIFICANT CHANGE UP (ref 1.6–2.6)
MCHC RBC-ENTMCNC: 31.5 GM/DL — LOW (ref 32–36)
MCHC RBC-ENTMCNC: 32.1 GM/DL — SIGNIFICANT CHANGE UP (ref 32–36)
MCHC RBC-ENTMCNC: 33.1 PG — SIGNIFICANT CHANGE UP (ref 27–34)
MCHC RBC-ENTMCNC: 33.6 PG — SIGNIFICANT CHANGE UP (ref 27–34)
MCV RBC AUTO: 104.7 FL — HIGH (ref 80–100)
MCV RBC AUTO: 105 FL — HIGH (ref 80–100)
MONOCYTES # BLD AUTO: 0.3 K/UL — SIGNIFICANT CHANGE UP (ref 0–0.9)
MONOCYTES NFR BLD AUTO: 7.6 % — SIGNIFICANT CHANGE UP (ref 2–14)
NEUTROPHILS # BLD AUTO: 2.8 K/UL — SIGNIFICANT CHANGE UP (ref 1.8–7.4)
NEUTROPHILS NFR BLD AUTO: 70.8 % — SIGNIFICANT CHANGE UP (ref 43–77)
NRBC # BLD: 0 /100 WBCS — SIGNIFICANT CHANGE UP (ref 0–0)
NRBC # BLD: 0 /100 WBCS — SIGNIFICANT CHANGE UP (ref 0–0)
NRBC # FLD: 0 K/UL — SIGNIFICANT CHANGE UP (ref 0–0)
NRBC # FLD: 0 K/UL — SIGNIFICANT CHANGE UP (ref 0–0)
PHOSPHATE SERPL-MCNC: 3.7 MG/DL — SIGNIFICANT CHANGE UP (ref 2.5–4.5)
PLATELET # BLD AUTO: 104 K/UL — LOW (ref 150–400)
PLATELET # BLD AUTO: 122 K/UL — LOW (ref 150–400)
POTASSIUM SERPL-MCNC: 3.6 MMOL/L — SIGNIFICANT CHANGE UP (ref 3.5–5.3)
POTASSIUM SERPL-SCNC: 3.6 MMOL/L — SIGNIFICANT CHANGE UP (ref 3.5–5.3)
POTASSIUM SERPL-SCNC: 3.8 MMOL/L
PROT SERPL-MCNC: 5.8 G/DL — LOW (ref 6–8.3)
PROT SERPL-MCNC: 6.4 G/DL
RBC # BLD: 2.11 M/UL — LOW (ref 4.2–5.8)
RBC # BLD: 2.42 M/UL — LOW (ref 4.2–5.8)
RBC # FLD: 25.4 % — HIGH (ref 10.3–14.5)
RBC # FLD: SIGNIFICANT CHANGE UP (ref 10.3–14.5)
RH IG SCN BLD-IMP: NEGATIVE — SIGNIFICANT CHANGE UP
SODIUM SERPL-SCNC: 136 MMOL/L — SIGNIFICANT CHANGE UP (ref 135–145)
SODIUM SERPL-SCNC: 140 MMOL/L
URATE SERPL-MCNC: 8.4 MG/DL
WBC # BLD: 3.95 K/UL — SIGNIFICANT CHANGE UP (ref 3.8–10.5)
WBC # BLD: 4.57 K/UL — SIGNIFICANT CHANGE UP (ref 3.8–10.5)
WBC # FLD AUTO: 3.95 K/UL — SIGNIFICANT CHANGE UP (ref 3.8–10.5)
WBC # FLD AUTO: 4.57 K/UL — SIGNIFICANT CHANGE UP (ref 3.8–10.5)

## 2023-01-30 PROCEDURE — 99232 SBSQ HOSP IP/OBS MODERATE 35: CPT

## 2023-01-30 PROCEDURE — 99232 SBSQ HOSP IP/OBS MODERATE 35: CPT | Mod: GC

## 2023-01-30 PROCEDURE — 99233 SBSQ HOSP IP/OBS HIGH 50: CPT

## 2023-01-30 RX ORDER — COLCHICINE 0.6 MG
0.3 TABLET ORAL DAILY
Refills: 0 | Status: DISCONTINUED | OUTPATIENT
Start: 2023-01-30 | End: 2023-02-01

## 2023-01-30 RX ADMIN — CHLORHEXIDINE GLUCONATE 1 APPLICATION(S): 213 SOLUTION TOPICAL at 12:06

## 2023-01-30 RX ADMIN — Medication 2000 UNIT(S): at 12:05

## 2023-01-30 RX ADMIN — Medication 1 MILLIGRAM(S): at 12:05

## 2023-01-30 RX ADMIN — HYDROMORPHONE HYDROCHLORIDE 0.5 MILLIGRAM(S): 2 INJECTION INTRAMUSCULAR; INTRAVENOUS; SUBCUTANEOUS at 04:56

## 2023-01-30 RX ADMIN — CILOSTAZOL 100 MILLIGRAM(S): 100 TABLET ORAL at 05:04

## 2023-01-30 RX ADMIN — CALCITRIOL 0.5 MICROGRAM(S): 0.5 CAPSULE ORAL at 12:05

## 2023-01-30 RX ADMIN — Medication 30 MILLIGRAM(S): at 05:04

## 2023-01-30 RX ADMIN — HYDROMORPHONE HYDROCHLORIDE 0.5 MILLIGRAM(S): 2 INJECTION INTRAMUSCULAR; INTRAVENOUS; SUBCUTANEOUS at 04:24

## 2023-01-30 RX ADMIN — CARVEDILOL PHOSPHATE 6.25 MILLIGRAM(S): 80 CAPSULE, EXTENDED RELEASE ORAL at 17:48

## 2023-01-30 RX ADMIN — Medication 100 MILLIGRAM(S): at 12:06

## 2023-01-30 RX ADMIN — CARVEDILOL PHOSPHATE 6.25 MILLIGRAM(S): 80 CAPSULE, EXTENDED RELEASE ORAL at 05:03

## 2023-01-30 RX ADMIN — CILOSTAZOL 100 MILLIGRAM(S): 100 TABLET ORAL at 17:47

## 2023-01-30 RX ADMIN — PANTOPRAZOLE SODIUM 40 MILLIGRAM(S): 20 TABLET, DELAYED RELEASE ORAL at 12:05

## 2023-01-30 NOTE — PHYSICAL THERAPY INITIAL EVALUATION ADULT - PASSIVE RANGE OF MOTION EXAMINATION, REHAB EVAL
unable to check RLE ROM due to pt with increased pain with ROM/Left LE Passive ROM was WFL (within functional limits)

## 2023-01-30 NOTE — DISCHARGE NOTE PROVIDER - NSDCCPCAREPLAN_GEN_ALL_CORE_FT
PRINCIPAL DISCHARGE DIAGNOSIS  Diagnosis: Anemia  Assessment and Plan of Treatment: LIkely contributed by your MDS. You were given multiple units of packed red blood cells during your stay.      SECONDARY DISCHARGE DIAGNOSES  Diagnosis: Gout  Assessment and Plan of Treatment: You were treated with Colchicine while inpatient.     PRINCIPAL DISCHARGE DIAGNOSIS  Diagnosis: Anemia  Assessment and Plan of Treatment: LIkely contributed by your MDS. You were given multiple units of packed red blood cells during your stay. You were evaluated by gastroenterologist and feels that GI bleed was not contributory to your anemia at this time.  Please follow up with your outpatient doctor for routine colonoscopy. Hematologist was managing your MDS while inpatient. Continue to follow up with them as outpatient.      SECONDARY DISCHARGE DIAGNOSES  Diagnosis: Gout  Assessment and Plan of Treatment: You were treated with Colchicine while inpatient.     PRINCIPAL DISCHARGE DIAGNOSIS  Diagnosis: Anemia  Assessment and Plan of Treatment: LIkely contributed by your MDS. You were given multiple units of packed red blood cells during your stay. You were evaluated by gastroenterologist and feels that GI bleed was not contributory to your anemia at this time.  Please follow up with your outpatient doctor for routine colonoscopy. Hematologist was managing your MDS while inpatient. Continue to follow up with them as outpatient.  STOP aspirin and Plavix due to low blood count and low platelet count. Follow up [facility] PCP and Cardiologist when to resume.      SECONDARY DISCHARGE DIAGNOSES  Diagnosis: Gout  Assessment and Plan of Treatment: You were treated with Colchicine while inpatient. Continue Colchicine and Allopurinol. You had an incision and drainage with cultures taken that came back with no bacteria growth.  - Follow up: Please follow up with Dr. Hatfield within 1 week of discharge from the hospital, please call [phone] for appointment and discuss that you recently were seen in the hospital.  - Wound Care: Please apply aquacell, 4x4 gauze and fernando to 6 foot daily.  - Weight bearing: Please weight bearing as tolerated in a surgical to R foot.   - Antibiotics: Please continue as instructed.  follow up w/ Rheumatology Dr. Duams    Diagnosis: Acute renal failure superimposed on stage 4 chronic kidney disease  Assessment and Plan of Treatment: Kidney function was elevated on admission now stable.  Follow up with Nephrologist Dr. Cabral in 2-6 weeks.    Diagnosis: Elevated liver function tests  Assessment and Plan of Treatment: STOP your Rosuvastatin due to elevated liver enzymes. Please get repeat labs (CBC, BMP) in 1 week to check your blood count, kidney function, and liver enzymes.    Diagnosis: NSTEMI (non-ST elevation myocardial infarction)  Assessment and Plan of Treatment: You had elevated cardiac enzymes likely due to your anemia therefore no intervention was needed. Echo showed moderate aortic stenosis which will need to be monitored closely with repeat echos.  Continue medical management with Carvedilol however dose reduced due to low blood pressures.  STOP aspirin and Plavix due to low blood count and low platelet count. Follow up [facility] PCP and Cardiologist when to resume.  You have an appointment with your PCP Dr. Reddy 2/13/23 at 10:40 AM.  You have an appointment with your Cardiologist Dr. Meraz 03/08/23 at 11:00 AM.    Diagnosis: MDS (myelodysplastic syndrome)  Assessment and Plan of Treatment: Continue to hold Revlimid then follow up with Dr. Mcmahon at the [facility].    Diagnosis: Acute low back pain  Assessment and Plan of Treatment: Pain control with Oxycodone x5 days as needed for severe pain. FOllow up with PCP for further pain management.

## 2023-01-30 NOTE — PHYSICAL THERAPY INITIAL EVALUATION ADULT - GENERAL OBSERVATIONS, REHAB EVAL
Pt received semi supine in bed , +R hip/knee in flexed position - unable to extend R hip/R knee due to pain +swelling +tightness of RLE, pain on LLE with movements/activities/palpation - PARIS Hung made aware of pt's bilateral leg pain/swelling /stiffness

## 2023-01-30 NOTE — DISCHARGE NOTE PROVIDER - NSDCMRMEDTOKEN_GEN_ALL_CORE_FT
vestibular rehab. :   acetaminophen 325 mg oral tablet: 2 tab(s) orally every 6 hours, As needed, Temp greater or equal to 38C (100.4F), Mild Pain (1 - 3)  allopurinol 100 mg oral tablet: orally once a day  aluminum hydroxide-magnesium hydroxide 200 mg-200 mg/5 mL oral suspension: 30 milliliter(s) orally every 6 hours, As needed, Dyspepsia  amLODIPine 5 mg oral tablet: 1 tab(s) orally once a day  aspirin 81 mg oral tablet, chewable: 1 tab(s) orally once a day  calcitriol 0.5 mcg oral capsule: 1 cap(s) orally once a day  carvedilol 25 mg oral tablet: 1 tab(s) orally 2 times a day  cilostazol 100 mg oral tablet: 1 tab(s) orally 2 times a day  clopidogrel 75 mg oral tablet: 1 tab(s) orally once a day  ketoconazole 2% topical cream: Apply topically to affected area 2 times a day  Lenalidomide 5 mg oral capsule: 1 cap(s) orally once a day  meclizine 25 mg oral tablet: 1 tab(s) orally every 12 hours  mometasone 0.1% topical cream: Apply topically to affected area 2 times a day (apply sparingly0  polyethylene glycol 3350 oral powder for reconstitution: 17 gram(s) orally once a day  rosuvastatin 10 mg oral tablet: 1 tab(s) orally once a day  senna oral tablet: 2 tab(s) orally once a day (at bedtime)   allopurinol 100 mg oral tablet: 1 tab(s) orally once a day   aluminum hydroxide-magnesium hydroxide 200 mg-200 mg/5 mL oral suspension: 30 milliliter(s) orally every 6 hours, As needed, Dyspepsia  calcitriol 0.5 mcg oral capsule: 1 cap(s) orally once a day  carvedilol 6.25 mg oral tablet: 1 tab(s) orally every 12 hours  cholecalciferol oral tablet: 2000 unit(s) orally once a day  cilostazol 100 mg oral tablet: 1 tab(s) orally 2 times a day  colchicine 0.6 mg oral tablet: 1 tab(s) orally every other day  folic acid 1 mg oral tablet: 1 tab(s) orally once a day  meclizine 25 mg oral tablet: 1 tab(s) orally every 12 hours, As needed, Dizziness  oxyCODONE 5 mg oral tablet: 1 tab(s) orally every 6 hours, As needed, Severe Pain (7 - 10) MDD:4 tabs  polyethylene glycol 3350 oral powder for reconstitution: 17 gram(s) orally once a day  Protonix 40 mg oral delayed release tablet: 1 tab(s) orally once a day   senna oral tablet: 2 tab(s) orally once a day (at bedtime)

## 2023-01-30 NOTE — DISCHARGE NOTE PROVIDER - CARE PROVIDERS DIRECT ADDRESSES
,DirectAddress_Unknown ,DirectAddress_Unknown,[email or web address]_Unknown,[email or web address]

## 2023-01-30 NOTE — PROGRESS NOTE ADULT - ASSESSMENT
82M MDS, dCHF, PAD, CKD, Prostate cancer s/p prostatectomy.   Here 89 for new onset Afib and acute on chronic anemia.   Unclear significance of 99.9F and bands 7.8%.   Started empirically on Cefepime but clinically not infectious and workup for one unrevealing.   Has right hallux gout, low suspicion for secondary infection at this time.   Elevated LFTs with reassuring US.     Suggest  -monitor off antibiotics     Will sign off, call back if needed    Demario Aviles MD   Infectious Disease   Available on TEAMS. After 5PM and on weekends please page fellow on call or call [phone]

## 2023-01-30 NOTE — DISCHARGE NOTE PROVIDER - NSDCCPTREATMENT_GEN_ALL_CORE_FT
PRINCIPAL PROCEDURE  Procedure: Complete x-ray of foot  Findings and Treatment:   < end of copied text >  IMPRESSION:  Generalized soft tissue swelling. No tracking gas collections or   radiopaque foreign bodies or gross radiographic evidence for   osteomyelitis.  No fractures or dislocations.  Tarsometatarsal alignment maintained without evidence for a Lisfranc   injury.  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 Preserved remaining visualized joint spaces.  Plantar and posterior calcaneal enthesophytes.  Generalized osteopenia otherwise no discrete lytic or blastic lesions.  Posterior tibial distribution vascular calcifications.< from: Xray Foot AP + Lateral + Oblique, Right (01.29.23 @ 19:36) >         PRINCIPAL PROCEDURE  Procedure: Complete x-ray of foot  Findings and Treatment:   < end of copied text >  IMPRESSION:  Generalized soft tissue swelling. No tracking gas collections or   radiopaque foreign bodies or gross radiographic evidence for   osteomyelitis.  No fractures or dislocations.  Tarsometatarsal alignment maintained without evidence for a Lisfranc   injury.  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 Preserved remaining visualized joint spaces.  Plantar and posterior calcaneal enthesophytes.  Generalized osteopenia otherwise no discrete lytic or blastic lesions.  Posterior tibial distribution vascular calcifications.< from: Xray Foot AP + Lateral + Oblique, Right (01.29.23 @ 19:36) >        SECONDARY PROCEDURE  Procedure: Transthoracic echo  Findings and Treatment: Mitral annular calcification, otherwise normal mitral  valve. Mild-moderate mitral regurgitation.  2. Calcified trileaflet aortic valve with decreased  opening. Peak transaortic valve gradient equals 29 mm Hg,  mean transaortic valve gradient equals 20 mm Hg, estimated  aortic valve area equals 1.4 sqcm (by continuity equation),  aortic valve velocity time integral equals 59 cm,  consistent with moderate aortic stenosis. Minimal aortic  regurgitation.  3. Normal left ventricular internal dimensions and wall  thicknesses.  4. Normal left ventricular systolic function. No segmental  wall motion abnormalities.  5. Normal right ventricular size and function.

## 2023-01-30 NOTE — CHART NOTE - NSCHARTNOTEFT_GEN_A_CORE
Notified by RN that patients IV infiltrated while receiving blood transfusion (received 2 hours of blood transfusion). Transfusion was stopped and arm elevated. Upon exam, +ecchymosis LUE with associated swelling and mild tenderness. Palpable passes. RN instructed to monitor for compartment syndrome and pt instructed to let the team know if his pain gets worse. If pt develops any concerning signs or symptoms of compartment syndrome, will consult vascular STAT. RN to place new IV in RUE to finish the last hour of the blood transfusion. Will get post transfusion CBC. Discussed with Dr. Aldrich.

## 2023-01-30 NOTE — PROGRESS NOTE ADULT - ASSESSMENT
82M MDS, chronic diastolic CHF, HTN, Prostate CA s/p Prostectomy, Carotid Art stenosis s/p CEA, PAD, Renal Art Stenosis s/p stent, CKD, p/w Acute on chronic anemia from MDS c/b New Afib, Gout flare, JUAN RAMON on CKD.

## 2023-01-30 NOTE — PROGRESS NOTE ADULT - PROBLEM SELECTOR PLAN 4
- ECG = Sinus rhythm w/ occasional PVCs and PACs at 99 bpm, QTc = 503  - [id number] score = 5  - A-fib likely of multifactorial etiology; infection, dehydration, anemia.  Unlikely CHF (pro-BNP unlikely of cardiac origin)  - receiving antibiotic, being hydrated and being transfused PRBCs  - has ~ II/VI systolic murmur (pronounced over aortic and mitral valve areas)  - being followed by Cardiology team (appreciated)  - continuing with reduced dose of carvedilol (from 25 mg to 6.25 mg), (reduced due to borderline low blood pressure)  - continuing w/ statin  - aspirin and Plavix on hold presently (pls d/w Cardiology team re restarting same)  - f/u TTE (ordered)

## 2023-01-30 NOTE — DISCHARGE NOTE PROVIDER - CARE PROVIDER_API CALL
Erica Hatfield  FOOT AND ANKLE SURGERY  [address] [facility], [address]  [place] [address]  Phone: [phone]  Fax: [phone]  Follow Up Time:    Erica Hatfield  FOOT AND ANKLE SURGERY  [address] [facility] [address] [facility], [address]  [place] [address]  Phone: [phone]  Fax: [phone]  Follow Up Time:     Maksim Reddy)  Internal Medicine  [address] [place] [address]  Phone: [phone]  Fax: [phone]  Follow Up Time:     Zita Mcmahon; PhD)  Hematology; Internal Medicine; Oncology  [address] [place] [address]  Phone: [phone]  Fax: [phone]  Follow Up Time:     Gabriel Reddy (DO)  Cardiovascular Disease; Internal Medicine; Nuclear Cardiology  [address] [place] [address]  Phone: [phone]  Fax: [phone]  Follow Up Time:     Edis Cabral)  Internal Medicine; Nephrology  [address] [facility], [facility] [address]  [place] [address]  Phone: [phone]  Fax: [phone]  Follow Up Time:     Jenny Dumas)  Internal Medicine; Rheumatology  [address] [facility], [facility] [address]  [place] [address]  Phone: [phone]  Fax: [phone]  Follow Up Time:

## 2023-01-30 NOTE — PROGRESS NOTE ADULT - PROBLEM SELECTOR PLAN 2
Rt great toe with pain, erythema and edema  - Prednisone started as pt with gib and unable to give colchicine due to elevated creatinine  - cont steroids, ppi ppx  - blood cultures neg to date  - PT eval once pain is improved enough to ambulate. Rt great toe with pain, erythema and edema  - ineffective improvement with Prednisone  - d/w Renal - ok for Colchicine. Will start on 1/30.  - PT eval once pain is improved enough to ambulate.

## 2023-01-30 NOTE — DISCHARGE NOTE PROVIDER - NSDCFUADDAPPT_GEN_ALL_CORE_FT
Podiatry Discharge Instructions:  - Follow up: Please follow up with Dr. Hatfield within 1 week of discharge from the hospital, please call [phone] for appointment and discuss that you recently were seen in the hospital.  - Wound Care: Please apply packing to R  foot wound daily, followed by 4x4 gauze and Kerlex.   - Weight bearing: Please weight bearing as tolerated in a surgical to R foot.   - Antibiotics: Please continue as instructed.    ======================== Podiatry Discharge Instructions:  - Follow up: Please follow up with Dr. Hatfield within 1 week of discharge from the hospital, please call [phone] for appointment and discuss that you recently were seen in the hospital.  - Wound Care: Please apply aquacell, 4x4 gauze and fernando to 6 foot daily.  - Weight bearing: Please weight bearing as tolerated in a surgical to R foot.   - Antibiotics: Please continue as instructed.    ======================== You have an appointment with your PCP Dr. Reddy 2/13/23 at 10:40 AM. You have an appointment with your Cardiologist Dr. Reddy 03/08/23 at 11:00 AM.  Follow up with Hematology/oncology Dr. Mcmahon in 1-2 weeks. You have an appointment on 2/23/23 at 11:00 AM.  Follow up with Nephrologist Dr. Cabral in 2-6 weeks.  Follow up with Rheumatology Dr. Dumas    Podiatry Discharge Instructions:  - Follow up: Please follow up with Dr. Hatfield within 1 week of discharge from the hospital, please call [phone] for appointment and discuss that you recently were seen in the hospital.  - Wound Care: Please apply aquacell, 4x4 gauze and fernando to 6 foot daily.  - Weight bearing: Please weight bearing as tolerated in a surgical to R foot.   - Antibiotics: Please continue as instructed.    ========================

## 2023-01-30 NOTE — PROGRESS NOTE ADULT - SUBJECTIVE AND OBJECTIVE BOX
Overnight events noted      VITAL:  T(C): , Max: 36.8 (01-29-23 @ 18:20)  T(F): , Max: 98.3 (01-30-23 @ 10:06)  HR: 82 (01-30-23 @ 10:30)  BP: 116/71 (01-30-23 @ 10:30)  BP(mean): --  RR: 18 (01-30-23 @ 10:30)  SpO2: 97% (01-30-23 @ 10:30)      PHYSICAL EXAM:  Constitutional: NAD, Alert  HEENT: NCAT, DMM  Neck: Supple, No JVD  Respiratory: CTA-b/l  Cardiovascular: irreg tachy s1s2  Gastrointestinal: BS+, soft, NT/ND  Extremities: 1+ b/l LE edema  Neurological: no focal deficits; strength grossly intact  Back: no CVAT b/l  Skin: (+)pallor    LABS:                        7.1    3.95  )-----------( 104      ( 30 Jan 2023 05:30 )             22.1     Na(136)/K(3.6)/Cl(104)/HCO3(19)/BUN(74)/Cr(2.65)Glu(155)/Ca(8.4)/Mg(2.50)/PO4(3.7)    01-30 @ 05:30  Na(139)/K(4.1)/Cl(105)/HCO3(20)/BUN(65)/Cr(2.76)Glu(242)/Ca(8.6)/Mg(2.70)/PO4(4.3)    01-29 @ 05:48  Na(138)/K(3.9)/Cl(108)/HCO3(20)/BUN(64)/Cr(2.70)Glu(160)/Ca(8.5)/Mg(2.40)/PO4(3.1)    01-28 @ 19:36  Na(139)/K(3.3)/Cl(107)/HCO3(20)/BUN(69)/Cr(2.98)Glu(124)/Ca(8.2)/Mg(2.60)/PO4(4.1)    01-28 @ 06:12  Na(138)/K(3.4)/Cl(101)/HCO3(22)/BUN(62)/Cr(3.22)Glu(131)/Ca(8.7)/Mg(2.60)/PO4(--)    01-27 @ 14:00      IMPRESSION: 82M w/ HTN, gout, prostate CA, USHA, and CKD4, 1/27/23 p/w anemia/new AFib    (1)CKD - stage 4, with GFR 20-30ml/min - largely due to renal artery stenosis  (2)JUAN RAMON - prerenal - improved relative to admission  (3)Anemia - ?GIB - GI on board  (4)EP - rapid afib on admission  (5)Gout - on steroids - I would be okay with using low-dose Colchicine here, provided it is okay from a Heme standpoint (risk of further myelosuppression from use)    RECOMMEND:  (1)PRBCs per primary team/Heme/GI  (2)Endoscopic workup per GI  (3)No objection to adding Colchicine 0.3mg po qd, provided it is okay from Heme standpoint  (4)Dose new meds for GFR 20-30ml/min      Edis Cabral MD  [facility]  Office: [phone]  Cell: [phone]       (+)R foot pain  No SOB    VITAL:  T(C): , Max: 36.8 (01-29-23 @ 18:20)  T(F): , Max: 98.3 (01-30-23 @ 10:06)  HR: 82 (01-30-23 @ 10:30)  BP: 116/71 (01-30-23 @ 10:30)  RR: 18 (01-30-23 @ 10:30)  SpO2: 97% (01-30-23 @ 10:30)      PHYSICAL EXAM:  Constitutional: NAD, Alert  HEENT: NCAT, DMM  Neck: Supple, No JVD  Respiratory: CTA-b/l  Cardiovascular: s1s2  Gastrointestinal: BS+, soft, NT/ND  Extremities: 1+ b/l LE edema  Neurological: no focal deficits; strength grossly intact  Back: no CVAT b/l  Skin: (+)pallor; (+)erythema of R 1st toe    LABS:                        7.1    3.95  )-----------( 104      ( 30 Jan 2023 05:30 )             22.1     Na(136)/K(3.6)/Cl(104)/HCO3(19)/BUN(74)/Cr(2.65)Glu(155)/Ca(8.4)/Mg(2.50)/PO4(3.7)    01-30 @ 05:30  Na(139)/K(4.1)/Cl(105)/HCO3(20)/BUN(65)/Cr(2.76)Glu(242)/Ca(8.6)/Mg(2.70)/PO4(4.3)    01-29 @ 05:48  Na(138)/K(3.9)/Cl(108)/HCO3(20)/BUN(64)/Cr(2.70)Glu(160)/Ca(8.5)/Mg(2.40)/PO4(3.1)    01-28 @ 19:36  Na(139)/K(3.3)/Cl(107)/HCO3(20)/BUN(69)/Cr(2.98)Glu(124)/Ca(8.2)/Mg(2.60)/PO4(4.1)    01-28 @ 06:12  Na(138)/K(3.4)/Cl(101)/HCO3(22)/BUN(62)/Cr(3.22)Glu(131)/Ca(8.7)/Mg(2.60)/PO4(--)    01-27 @ 14:00      IMPRESSION: 82M w/ HTN, gout, prostate CA, USHA, and CKD4, 1/27/23 p/w anemia/new AFib    (1)CKD - stage 4, with GFR 20-30ml/min - largely due to renal artery stenosis  (2)JUAN RAMON - prerenal - improved relative to admission  (3)Anemia - ?GIB - GI on board  (4)EP - rapid afib on admission  (5)Gout - on steroids - I would be okay with using low-dose Colchicine here, provided it is okay from a Heme standpoint (risk of further myelosuppression from use)    RECOMMEND:  (1)PRBCs per primary team/Heme/GI  (2)Endoscopic workup per GI  (3)No objection to adding Colchicine 0.3mg po qd, provided it is okay from Heme standpoint  (4)Dose new meds for GFR 20-30ml/min      Edis Cabral MD  [facility]  Office: [phone]  Cell: [phone]

## 2023-01-30 NOTE — PROGRESS NOTE ADULT - SUBJECTIVE AND OBJECTIVE BOX
Patient seen and examined at bedside.    Overnight Events:     No AEON     Denies any chest pain, SOB, palpitations     Reporting R LLE/toe pain     Review Of Systems: No chest pain, shortness of breath, or palpitations            Current Meds:  acetaminophen     Tablet .. 650 milliGRAM(s) Oral every 6 hours PRN  allopurinol 100 milliGRAM(s) Oral daily  aluminum hydroxide/magnesium hydroxide/simethicone Suspension 30 milliLiter(s) Oral every 6 hours PRN  atorvastatin 40 milliGRAM(s) Oral at bedtime  calcitriol   Capsule 0.5 MICROGram(s) Oral daily  carvedilol 6.25 milliGRAM(s) Oral every 12 hours  chlorhexidine 2% Cloths 1 Application(s) Topical daily  cholecalciferol 2000 Unit(s) Oral daily  cilostazol 100 milliGRAM(s) Oral two times a day  folic acid 1 milliGRAM(s) Oral daily  HYDROmorphone  Injectable 1 milliGRAM(s) IV Push every 4 hours PRN  HYDROmorphone  Injectable 0.5 milliGRAM(s) IV Push every 4 hours PRN  meclizine 25 milliGRAM(s) Oral every 12 hours PRN  melatonin 3 milliGRAM(s) Oral at bedtime PRN  pantoprazole  Injectable 40 milliGRAM(s) IV Push daily  polyethylene glycol 3350 17 Gram(s) Oral daily PRN  predniSONE   Tablet 30 milliGRAM(s) Oral daily  senna 2 Tablet(s) Oral at bedtime PRN      Vitals:  T(F): 98 (01-30), Max: 98.2 (01-29)  HR: 73 (01-30) (71 - 74)  BP: 125/63 (01-30) (117/55 - 129/60)  RR: 18 (01-30)  SpO2: 95% (01-30)  I&O's Summary      Physical Exam:  Appearance: In mild distress   Cardiovascular: RRR, S1, S2, systolic murmur; LE edmea   Respiratory: Clear to auscultation bilaterally  Musculoskeletal: R first toe swelling, erythema   Neurologic: Non-focal  Psychiatry: AAOx3, mood & affect appropriate                            7.1    3.95  )-----------( 104      ( 30 Jan 2023 05:30 )             22.1     01-29    139  |  105  |  65<H>  ----------------------------<  242<H>  4.1   |  20<L>  |  2.76<H>    Ca    8.6      29 Jan 2023 05:48  Phos  4.3     01-29  Mg     2.70     01-29    TPro  6.5  /  Alb  3.4  /  TBili  1.8<H>  /  DBili  x   /  AST  101<H>  /  ALT  266<H>  /  AlkPhos  148<H>  01-29      CARDIAC MARKERS ( 28 Jan 2023 06:12 )  478 ng/L / x     / x     / x     / x     / x      CARDIAC MARKERS ( 27 Jan 2023 16:20 )  411 ng/L / x     / x     / x     / x     / x      CARDIAC MARKERS ( 27 Jan 2023 14:00 )  416 ng/L / x     / x     / x     / x     / x          Serum Pro-Brain Natriuretic Peptide: 01482 pg/mL (01-28 @ 06:12)  Serum Pro-Brain Natriuretic Peptide: 57777 pg/mL (01-27 @ 14:00)          New ECG(s):     Echo:    Stress Testing:     Cath:    Imaging:    Interpretation of Telemetry:

## 2023-01-30 NOTE — DISCHARGE NOTE PROVIDER - HOSPITAL COURSE
82M MDS, chronic diastolic CHF, HTN, Prostate CA s/p Prostectomy, Carotid Art stenosis s/p CEA, PAD, Renal Art Stenosis s/p stent, CKD, p/w Acute on chronic anemia from MDS c/b New Afib, acute Gout flare, JUAN RAMON on CKD.     Problem/Plan :  ·  Problem: Acute gout.   ·  Plan: Appreciate podiatry consult - s/p I+D of the tophi  - likely acute gout with delayed clinical improvement, low clinical suspicion for infection, recent Foot xray did not yield obvious infectious etiology.  - Continue Colchicine qOD, cont allopurinol  - Rheum consult requested for assistance in management.  - PT eval for safe disposition - patient and family prefers home.     Problem/Plan   ·  Problem: Acute on chronic anemia.   ·  Plan: - macrocytic.  Hgb = 6.3, MCV = 110.9, in the context of MDS  - s/p 3 units of p-RBCs  - anemia is likely of multifactorial etiology; chronic disease, blood loss, dietary deficiency  - ferritin elevated  - GI consulted - on regular diet, cont ppi - low clinical suspicion for GI bleed  - folic acid started, Hem/Onc consulted, per heme hold revlimid  - IV infiltration of the PRBC transfusion on 1/30 - will elevate and monitor clinical status.  - another 1u PRBC transfusion on 2/2 with good response.     Problem/Plan   ·  Problem: Neutropenic fever.   ·  Plan: - started on cefepime 1 gram Q24H (modified dose due to CKD), discussed with ID, per ID pt with rt toe pain, likely gout flare rec to stop abx and start treatment for gout  - now improving, on colchicine for gout  - blood cultures neg to date  - +UA but pt asymptomatic, no need to treat with abx     Problem/Plan   ·  Problem: Atrial fibrillation, new onset.   ·  Plan: - ECG = Sinus rhythm w/ occasional PVCs and PACs at 99 bpm, QTc = 503  - [id number] score = 5  - A-fib likely of multifactorial etiology; infection, dehydration, anemia.  Unlikely CHF (pro-BNP unlikely of cardiac origin)  - receiving antibiotic, being hydrated and being transfused PRBCs  - has ~ II/VI systolic murmur (pronounced over aortic and mitral valve areas)  - being followed by Cardiology team (appreciated)  - continuing with reduced dose of carvedilol (from 25 mg to 6.25 mg), (reduced due to borderline low blood pressure)  - continuing w/ statin  - aspirin and Plavix on hold presently, pt to f/u as outpt to assess for restarting it, discussed with daughter Digna  -  TTE showed . Normal left ventricular systolic function. No segmental wall motion abnormalities.     Problem/Plan   ·  Problem: Acute low back pain.   ·  Plan: - acute on chronic  - patient reports herniated discs, spinal stenosis, sciatica  - has limitations in ROM - especially of the back and lower extremities  - cont pain control  - continues on calcitriol.     Problem/Plan   ·  Problem: Elevated brain natriuretic peptide (BNP) level.   ·  Plan: - pro-BNP = 47638.  Troponin = 416 --> 411  - ECG = Sinus rhythm w/ occasional PVCs and PACs at 99 bpm, QTc = 503  - TTE of 10/2022 w/ Stage II diastolic dysfunction  - has pedal edema, but elevated pro-BNP unlikely of cardiac origin.  Edema more-so due to anasarca or, possibly amlodipine (held for now)  -echo results as above.       Problem/Plan   ·  Problem: Acute renal failure superimposed on stage 4 chronic kidney disease.   ·  Plan: - lack of appetite for the past month - after starting med - Lenalidomide  - consequently w/ decreased oral intake, leading to dehydration  - also complicated by periodic diarrheal episodes  - evaluate for IVF hydration after transfusion completed  - f/u renal function, electrolytes   Nephrology consulted    ·  Problem: Elevated liver function tests.   ·  Plan: - history of NAFLD  - has mild tenderness over the RUQ area  - T-bili = 1.5, ALP = 138, AST = 213, ALT = 195; elevations likely impacted by current medical state  - f/u abdominal ultrasound showed Cholelithiasis withoutultrasound evidence of acute cholecystitis. Right renal atrophy, unchanged. Small right pleural effusion..     Problem/Plan   ·  Problem: Adult failure to thrive.   ·  Plan; - Nutrition Consult  - may benefit from appetite stimulating medication.    - aspirin and Plavix on hold due to suspected GIB and low hb and low plt count, discussed with daughter to f/u as outpt soon for restarting it if hb and plts are stable  - stable for dc, advised outpt f/u

## 2023-01-30 NOTE — DISCHARGE NOTE PROVIDER - PROVIDER TOKENS
PROVIDER:[TOKEN:[[id number]]] PROVIDER:[TOKEN:[[id number]]],PROVIDER:[TOKEN:[[id number]]],PROVIDER:[TOKEN:[[id number]]],PROVIDER:[TOKEN:[[id number]]],PROVIDER:[TOKEN:[[id number]]],PROVIDER:[TOKEN:[[id number]]]

## 2023-01-30 NOTE — PROGRESS NOTE ADULT - PROBLEM SELECTOR PLAN 1
- macrocytic.  Hgb = 6.3, MCV = 110.9, in the context of MDS  - s/p 2 units of p-RBCs  - anemia is likely of multifactorial etiology; chronic disease, blood loss, dietary deficiency  - ferritin elevated  - GI consulted and follg, per gi to start on regular diet, cont ppi  - monitor H&H and tranfuse prn if hb<7.5, rpt hb if less <7.5 transfuse prbc  - folic acid started  - Hem/Onc consulted, per heme hold revlimid  - IV infiltration of the PRBC transfusion on 1/30 - will elevate and monitor clinical status.

## 2023-01-30 NOTE — PROGRESS NOTE ADULT - SUBJECTIVE AND OBJECTIVE BOX
Follow Up: bandemia    Interval History/ROS: Afebrile. Feels the same. Chronic aches and pains. Right hallux still painful, unclear if better. No cough, diarrhea or dysuria.     Allergies  No Known Allergies        ANTIMICROBIALS:      OTHER MEDS:  acetaminophen     Tablet .. 650 milliGRAM(s) Oral every 6 hours PRN  allopurinol 100 milliGRAM(s) Oral daily  aluminum hydroxide/magnesium hydroxide/simethicone Suspension 30 milliLiter(s) Oral every 6 hours PRN  calcitriol   Capsule 0.5 MICROGram(s) Oral daily  carvedilol 6.25 milliGRAM(s) Oral every 12 hours  chlorhexidine 2% Cloths 1 Application(s) Topical daily  cholecalciferol 2000 Unit(s) Oral daily  cilostazol 100 milliGRAM(s) Oral two times a day  colchicine 0.3 milliGRAM(s) Oral daily  folic acid 1 milliGRAM(s) Oral daily  HYDROmorphone  Injectable 1 milliGRAM(s) IV Push every 4 hours PRN  HYDROmorphone  Injectable 0.5 milliGRAM(s) IV Push every 4 hours PRN  meclizine 25 milliGRAM(s) Oral every 12 hours PRN  melatonin 3 milliGRAM(s) Oral at bedtime PRN  pantoprazole  Injectable 40 milliGRAM(s) IV Push daily  polyethylene glycol 3350 17 Gram(s) Oral daily PRN  predniSONE   Tablet 30 milliGRAM(s) Oral daily  senna 2 Tablet(s) Oral at bedtime PRN      Vital Signs Last 24 Hrs  T(C): 37 (30 Jan 2023 12:05), Max: 37 (30 Jan 2023 12:05)  T(F): 98.6 (30 Jan 2023 12:05), Max: 98.6 (30 Jan 2023 12:05)  HR: 79 (30 Jan 2023 12:05) (71 - 83)  BP: 124/60 (30 Jan 2023 12:05) (101/59 - 129/60)  BP(mean): --  RR: 18 (30 Jan 2023 12:05) (17 - 18)  SpO2: 96% (30 Jan 2023 12:05) (94% - 97%)    Parameters below as of 30 Jan 2023 12:05  Patient On (Oxygen Delivery Method): nasal cannula        Physical Exam:  General: non toxic   Cardio: regular rate   Respiratory: nonlabored on room air  abd: nondistended, soft  Musculoskeletal: right hallux tophi, associated erythema appears less,    Skin: no rash                          7.1    3.95  )-----------( 104      ( 30 Jan 2023 05:30 )             22.1       01-30    136  |  104  |  74<H>  ----------------------------<  155<H>  3.6   |  19<L>  |  2.65<H>    Ca    8.4      30 Jan 2023 05:30  Phos  3.7     01-30  Mg     2.50     01-30    TPro  5.8<L>  /  Alb  3.0<L>  /  TBili  1.2  /  DBili  x   /  AST  130<H>  /  ALT  255<H>  /  AlkPhos  132<H>  01-30          MICROBIOLOGY:  Culture - Blood (collected 01-27-23 @ 23:10)  Source: .Blood Blood-Peripheral  Preliminary Report (01-29-23 @ 02:02):    No growth to date.    Culture - Blood (collected 01-27-23 @ 23:00)  Source: .Blood Blood-Venous  Preliminary Report (01-29-23 @ 02:02):    No growth to date.    RADIOLOGY:  Images below reviewed personally  Xray Foot AP + Lateral + Oblique, Right (01.29.23 @ 19:36)   Generalized soft tissue swelling. No tracking gas collections or   radiopaque foreign bodies or gross radiographic evidence for   osteomyelitis.  No fractures or dislocations.  Tarsometatarsal alignment maintained without evidence for a Lisfranc injury.  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 Preserved remaining visualized joint spaces.  Plantar and posterior calcaneal enthesophytes.  Generalized osteopenia otherwise no discrete lytic or blastic lesions.  Posterior tibial distribution vascular calcifications.    US Abdomen Complete (01.29.23 @ 13:26)   Cholelithiasis withoutultrasound evidence of acute cholecystitis.  Right renal atrophy, unchanged.  Small right pleural effusion..    Xray Chest 1 View AP/PA (01.27.23 @ 19:04)   Generalized hazy indistinct increased lung markings with slightly more   localized confluent hazy opacity in medial right lung base suggesting   degree of congestion with edema. No pleural effusions or pneumothorax.  Heart size and mediastinal width inaccurately assessed on the projection.   Scant aortic arch calcifications.  Trachea midline.  Mild spinal degenerative changes.

## 2023-01-30 NOTE — PROGRESS NOTE ADULT - ASSESSMENT
81 yo M, w/ PMHx significant for MDS, Stage II diastolic dysfunction, HTN, HLD, Prostate cancer - s/p Prostatectomy, Carotid artery stenosis - s/p Endarterectomy, PAD, Renal artery stenosis, and CKD, and presented to the ED, after being sent by outpatient rheumatologist, secondary to new onset atrial fibrillation. Pt found to have pancytopenia, elevated TBili/transaminases, JUAN RAMON. GI consulted for anemia without overt bleeding.    #Pancytopenia: Pt denies any hematochezia, melena, BRBPR. Hgb 6.3 on admission, s/p 2U pRBC with inadequate response. Pancytopenia likely in setting of his MDS, low likelihood for underlying GI bleed  #MDS: On revlimid and allopurinol as outpatient  #Elevated Tbili, transaminases: may be 2/2 DILI (revlimid can cause toxicity in cholestatic pattern) in background of underlying existing hepatic steatosis (seen on past US)  #Elevated pro-BNP  #Elevated HST  #JUAN RAMON  #New afib    Recommendations  - patient without evidence of GI bleeding at this time   - RUQ US w/ doppler    - trend CBC, CMP  - can consider endoscopy to evaluate drop in Hgb when patient is medically optimized    All recommendations are tentative until note is attested by attending.     Brittney Drake, PGY5  Gastroenterology/Hepatology Fellow  Available on [employer] Teams  [phone] ([email or web address] Short Range Pager)  [phone] (Long Range Pager)    After 5pm, please contact the on-call GI fellow. [phone] 81 yo M, w/ PMHx significant for MDS, Stage II diastolic dysfunction, HTN, HLD, Prostate cancer - s/p Prostatectomy, Carotid artery stenosis - s/p Endarterectomy, PAD, Renal artery stenosis, and CKD, and presented to the ED, after being sent by outpatient rheumatologist, secondary to new onset atrial fibrillation. Pt found to have pancytopenia, elevated TBili/transaminases, JUAN RAMON. GI consulted for anemia without overt bleeding.    #Pancytopenia: Pt denies any hematochezia, melena, BRBPR. Hgb 6.3 on admission, s/p 2U pRBC with inadequate response. Pancytopenia likely in setting of his MDS, low likelihood for underlying GI bleed  #MDS: On revlimid and allopurinol as outpatient  #Elevated Tbili, transaminases: may be 2/2 DILI (revlimid can cause toxicity in cholestatic pattern) in background of underlying existing hepatic steatosis (seen on past US)  #Elevated pro-BNP  #Elevated HST, NSTEMI  #JUAN RAMON  #New afib    Recommendations  - patient without evidence of GI bleeding at this time   - if counts remain stable and patient continues not to have any overt bleeding, then no absolute contraindication from GI standpoint to starting antiplatelet agent  - RUQ US w/ doppler    - trend CBC, CMP  - can consider endoscopy to evaluate drop in Hgb when patient is medically optimized, however this can also occur in the outpatient setting since patient has no overt GI bleed    All recommendations are tentative until note is attested by attending.     Brittney Drake, PGY5  Gastroenterology/Hepatology Fellow  Available on Microsoft Teams  [phone] ([employer] Short Range Pager)  [phone] (Long Range Pager)    After 5pm, please contact the on-call GI fellow. [phone] 83 yo M, w/ PMHx significant for MDS, Stage II diastolic dysfunction, HTN, HLD, Prostate cancer - s/p Prostatectomy, Carotid artery stenosis - s/p Endarterectomy, PAD, Renal artery stenosis, and CKD, and presented to the ED, after being sent by outpatient rheumatologist, secondary to new onset atrial fibrillation. Pt found to have pancytopenia, elevated TBili/transaminases, JUAN RAMON. GI consulted for anemia without overt bleeding.    #Pancytopenia: Pt denies any hematochezia, melena, BRBPR. Hgb 6.3 on admission, s/p 2U pRBC with inadequate response. Pancytopenia likely in setting of his MDS, low likelihood for underlying GI bleed  #MDS: On revlimid and allopurinol as outpatient  #Elevated Tbili, transaminases: may be 2/2 DILI (revlimid can cause toxicity in cholestatic pattern) in background of underlying existing hepatic steatosis (seen on past US)  #Elevated pro-BNP  #Elevated HST, NSTEMI  #JUAN RAMON  #New afib    Recommendations  - patient without evidence of GI bleeding at this time   - if counts remain stable and patient continues not to have any overt bleeding, then no absolute contraindication from GI standpoint to starting antiplatelet agent  - RUQ US w/ doppler    - trend CBC, CMP  - can consider endoscopy to evaluate drop in Hgb pending clinical course and optimization, but given absence of overt bleeding and other active issues, would defer at this time  - If liver tests continue to rise, would consult hepatology    All recommendations are tentative until note is attested by attending.     Brittney Drake, PGY5  Gastroenterology/Hepatology Fellow  Available on Microsoft Teams  [phone] ([employer] Short Range Pager)  [phone] (Long Range Pager)    After 5pm, please contact the on-call GI fellow. [phone]

## 2023-01-30 NOTE — PROGRESS NOTE ADULT - SUBJECTIVE AND OBJECTIVE BOX
Gastroenterology/Hepatology Progress Note    Interval Events: No melena, hematochezia. Continues to have fluctuating Hgb.    Allergies:  No Known Allergies    Hospital Medications:  acetaminophen     Tablet .. 650 milliGRAM(s) Oral every 6 hours PRN  allopurinol 100 milliGRAM(s) Oral daily  aluminum hydroxide/magnesium hydroxide/simethicone Suspension 30 milliLiter(s) Oral every 6 hours PRN  atorvastatin 40 milliGRAM(s) Oral at bedtime  calcitriol   Capsule 0.5 MICROGram(s) Oral daily  carvedilol 6.25 milliGRAM(s) Oral every 12 hours  chlorhexidine 2% Cloths 1 Application(s) Topical daily  cholecalciferol 2000 Unit(s) Oral daily  cilostazol 100 milliGRAM(s) Oral two times a day  folic acid 1 milliGRAM(s) Oral daily  HYDROmorphone  Injectable 1 milliGRAM(s) IV Push every 4 hours PRN  HYDROmorphone  Injectable 0.5 milliGRAM(s) IV Push every 4 hours PRN  meclizine 25 milliGRAM(s) Oral every 12 hours PRN  melatonin 3 milliGRAM(s) Oral at bedtime PRN  pantoprazole  Injectable 40 milliGRAM(s) IV Push daily  polyethylene glycol 3350 17 Gram(s) Oral daily PRN  predniSONE   Tablet 30 milliGRAM(s) Oral daily  senna 2 Tablet(s) Oral at bedtime PRN      ROS: 14 point ROS negative unless otherwise state in subjective    PHYSICAL EXAM:   Vital Signs:  Vital Signs Last 24 Hrs  T(C): 36.7 (2023 04:14), Max: 36.8 (2023 18:20)  T(F): 98 (2023 04:14), Max: 98.2 (2023 18:20)  HR: 73 (2023 04:14) (71 - 74)  BP: 125/63 (2023 04:14) (117/55 - 129/60)  BP(mean): --  RR: 18 (2023 04:14) (17 - 18)  SpO2: 95% (2023 04:14) (94% - 95%)    Parameters below as of 2023 20:08  Patient On (Oxygen Delivery Method): nasal cannula      Daily     Daily     GENERAL:  No acute distress  HEENT:  NCAT, no scleral icterus  CHEST: no resp distress  HEART:  RRR  ABDOMEN:  Soft, non-tender, non-distended   EXTREMITIES:  No cyanosis, clubbing, or edema  SKIN:  No rash/erythema/ecchymoses   NEURO:  Alert and oriented x 3     LABS:                        7.1    3.95  )-----------( 104      ( 2023 05:30 )             22.1     Mean Cell Volume: 104.7 fL (-23 @ 05:30)        136  |  104  |  74<H>  ----------------------------<  155<H>  3.6   |  19<L>  |  2.65<H>    Ca    8.4      2023 05:30  Phos  3.7       Mg     2.50         TPro  5.8<L>  /  Alb  3.0<L>  /  TBili  1.2  /  DBili  x   /  AST  130<H>  /  ALT  255<H>  /  AlkPhos  132<H>      LIVER FUNCTIONS - ( 2023 05:30 )  Alb: 3.0 g/dL / Pro: 5.8 g/dL / ALK PHOS: 132 U/L / ALT: 255 U/L / AST: 130 U/L / GGT: x             Urinalysis Basic - ( 2023 09:15 )    Color: Yellow / Appearance: Clear / S.018 / pH: x  Gluc: x / Ketone: Negative  / Bili: Negative / Urobili: <2 mg/dL   Blood: x / Protein: 100 mg/dL / Nitrite: Positive   Leuk Esterase: Large / RBC: 2 /HPF / WBC 63 /HPF   Sq Epi: x / Non Sq Epi: 0 /HPF / Bacteria: Negative            Imaging:  reviewed   Gastroenterology/Hepatology Progress Note    Interval Events: No melena, hematochezia. Continues to have fluctuating Hgb.    Allergies:  No Known Allergies    Hospital Medications:  acetaminophen     Tablet .. 650 milliGRAM(s) Oral every 6 hours PRN  allopurinol 100 milliGRAM(s) Oral daily  aluminum hydroxide/magnesium hydroxide/simethicone Suspension 30 milliLiter(s) Oral every 6 hours PRN  atorvastatin 40 milliGRAM(s) Oral at bedtime  calcitriol   Capsule 0.5 MICROGram(s) Oral daily  carvedilol 6.25 milliGRAM(s) Oral every 12 hours  chlorhexidine 2% Cloths 1 Application(s) Topical daily  cholecalciferol 2000 Unit(s) Oral daily  cilostazol 100 milliGRAM(s) Oral two times a day  folic acid 1 milliGRAM(s) Oral daily  HYDROmorphone  Injectable 1 milliGRAM(s) IV Push every 4 hours PRN  HYDROmorphone  Injectable 0.5 milliGRAM(s) IV Push every 4 hours PRN  meclizine 25 milliGRAM(s) Oral every 12 hours PRN  melatonin 3 milliGRAM(s) Oral at bedtime PRN  pantoprazole  Injectable 40 milliGRAM(s) IV Push daily  polyethylene glycol 3350 17 Gram(s) Oral daily PRN  predniSONE   Tablet 30 milliGRAM(s) Oral daily  senna 2 Tablet(s) Oral at bedtime PRN      ROS: 14 point ROS negative unless otherwise state in subjective    PHYSICAL EXAM:   Vital Signs:  Vital Signs Last 24 Hrs  T(C): 36.7 (2023 04:14), Max: 36.8 (2023 18:20)  T(F): 98 (2023 04:14), Max: 98.2 (2023 18:20)  HR: 73 (2023 04:14) (71 - 74)  BP: 125/63 (2023 04:14) (117/55 - 129/60)  BP(mean): --  RR: 18 (2023 04:14) (17 - 18)  SpO2: 95% (2023 04:14) (94% - 95%)    Parameters below as of 2023 20:08  Patient On (Oxygen Delivery Method): nasal cannula      Daily     Daily     GENERAL:  No acute distress  HEENT:  NCAT, no scleral icterus  CHEST: no resp distress  HEART:  RRR  ABDOMEN:  Soft, non-tender, non-distended   EXTREMITIES:  No cyanosis, clubbing, or edema  SKIN:  No rash/erythema/ecchymoses   NEURO:  Awake and alert    LABS:                        7.1    3.95  )-----------( 104      ( 2023 05:30 )             22.1     Mean Cell Volume: 104.7 fL (-23 @ 05:30)        136  |  104  |  74<H>  ----------------------------<  155<H>  3.6   |  19<L>  |  2.65<H>    Ca    8.4      2023 05:30  Phos  3.7       Mg     2.50         TPro  5.8<L>  /  Alb  3.0<L>  /  TBili  1.2  /  DBili  x   /  AST  130<H>  /  ALT  255<H>  /  AlkPhos  132<H>      LIVER FUNCTIONS - ( 2023 05:30 )  Alb: 3.0 g/dL / Pro: 5.8 g/dL / ALK PHOS: 132 U/L / ALT: 255 U/L / AST: 130 U/L / GGT: x             Urinalysis Basic - ( 2023 09:15 )    Color: Yellow / Appearance: Clear / S.018 / pH: x  Gluc: x / Ketone: Negative  / Bili: Negative / Urobili: <2 mg/dL   Blood: x / Protein: 100 mg/dL / Nitrite: Positive   Leuk Esterase: Large / RBC: 2 /HPF / WBC 63 /HPF   Sq Epi: x / Non Sq Epi: 0 /HPF / Bacteria: Negative

## 2023-01-30 NOTE — CHART NOTE - NSCHARTNOTEFT_GEN_A_CORE
Pt w/ no improvement with prednisone for his gout. Colchicine was on hold for his JUAN RAMON however per renal, no objection to adding Colchicine as long as it is okay with Heme. Spoke with Heme fellow who is OK with this. Colchicine 0.3mg daily started. Discussed with Dr. Aldrich.

## 2023-01-30 NOTE — PROGRESS NOTE ADULT - SUBJECTIVE AND OBJECTIVE BOX
Gastroenterology/Hepatology Progress Note    Interval Events:     Allergies:  No Known Allergies      Hospital Medications:  acetaminophen     Tablet .. 650 milliGRAM(s) Oral every 6 hours PRN  allopurinol 100 milliGRAM(s) Oral daily  aluminum hydroxide/magnesium hydroxide/simethicone Suspension 30 milliLiter(s) Oral every 6 hours PRN  calcitriol   Capsule 0.5 MICROGram(s) Oral daily  carvedilol 6.25 milliGRAM(s) Oral every 12 hours  chlorhexidine 2% Cloths 1 Application(s) Topical daily  cholecalciferol 2000 Unit(s) Oral daily  cilostazol 100 milliGRAM(s) Oral two times a day  folic acid 1 milliGRAM(s) Oral daily  HYDROmorphone  Injectable 1 milliGRAM(s) IV Push every 4 hours PRN  HYDROmorphone  Injectable 0.5 milliGRAM(s) IV Push every 4 hours PRN  meclizine 25 milliGRAM(s) Oral every 12 hours PRN  melatonin 3 milliGRAM(s) Oral at bedtime PRN  pantoprazole  Injectable 40 milliGRAM(s) IV Push daily  polyethylene glycol 3350 17 Gram(s) Oral daily PRN  predniSONE   Tablet 30 milliGRAM(s) Oral daily  senna 2 Tablet(s) Oral at bedtime PRN      ROS: 14 point ROS negative unless otherwise state in subjective    PHYSICAL EXAM:   Vital Signs:  Vital Signs Last 24 Hrs  T(C): 36.7 (2023 04:14), Max: 36.8 (2023 18:20)  T(F): 98 (2023 04:14), Max: 98.2 (2023 18:20)  HR: 73 (2023 04:14) (71 - 74)  BP: 125/63 (2023 04:14) (117/55 - 129/60)  BP(mean): --  RR: 18 (2023 04:14) (17 - 18)  SpO2: 95% (2023 04:14) (94% - 95%)    Parameters below as of 2023 20:08  Patient On (Oxygen Delivery Method): nasal cannula      Daily     Daily     GENERAL:  No acute distress  HEENT:  NCAT, no scleral icterus  CHEST: no resp distress  HEART:  RRR  ABDOMEN:  Soft, non-tender, non-distended, normoactive bowel sounds, no masses  EXTREMITIES:  No cyanosis, clubbing, or edema  SKIN:  No rash/erythema/ecchymoses/petechiae/wounds/abscess/warm/dry  NEURO:  Alert and oriented x 3, no asterixis, no tremor    LABS:                        7.1    3.95  )-----------( 104      ( 2023 05:30 )             22.1     Mean Cell Volume: 104.7 fL (-23 @ 05:30)        136  |  104  |  74<H>  ----------------------------<  155<H>  3.6   |  19<L>  |  2.65<H>    Ca    8.4      2023 05:30  Phos  3.7       Mg     2.50         TPro  5.8<L>  /  Alb  3.0<L>  /  TBili  1.2  /  DBili  x   /  AST  130<H>  /  ALT  255<H>  /  AlkPhos  132<H>      LIVER FUNCTIONS - ( 2023 05:30 )  Alb: 3.0 g/dL / Pro: 5.8 g/dL / ALK PHOS: 132 U/L / ALT: 255 U/L / AST: 130 U/L / GGT: x             Urinalysis Basic - ( 2023 09:15 )    Color: Yellow / Appearance: Clear / S.018 / pH: x  Gluc: x / Ketone: Negative  / Bili: Negative / Urobili: <2 mg/dL   Blood: x / Protein: 100 mg/dL / Nitrite: Positive   Leuk Esterase: Large / RBC: 2 /HPF / WBC 63 /HPF   Sq Epi: x / Non Sq Epi: 0 /HPF / Bacteria: Negative            Imaging:  reviewed

## 2023-01-30 NOTE — PHYSICAL THERAPY INITIAL EVALUATION ADULT - PATIENT PROFILE REVIEW, REHAB EVAL
activity order- bed rest 1/27, ok for activities in bed only today per RN Anabell as pt 's Hgb is 7.1./yes

## 2023-01-30 NOTE — PHYSICAL THERAPY INITIAL EVALUATION ADULT - PERTINENT HX OF CURRENT PROBLEM, REHAB EVAL
This is an 82 year old male, with past history significant for MDS, presented to the ED, after being sent by outpatient rheumatologist, secondary to new onset atrial fibrillation.  Diagnosed with Anemia in the ED.X-ray R foot- no fracture or dislocation.

## 2023-01-30 NOTE — PROGRESS NOTE ADULT - SUBJECTIVE AND OBJECTIVE BOX
[facility] Division of Hospital Medicine  Jonathon Aldrich MD  Pager (M-F, 8A-5P): [phone]  Other Times:  [phone]    Patient is a 82y old  Male who presents with a chief complaint of Anemia (30 Jan 2023 10:35)    SUBJECTIVE / OVERNIGHT EVENTS:  Patient receiving PRBC transfusion. Noted infiltration of IV on LUE while receiving PRBC. No F/C, N/V, CP, SOB, Cough, lightheadedness, dizziness, abdominal pain, diarrhea, dysuria.    MEDICATIONS  (STANDING):  allopurinol 100 milliGRAM(s) Oral daily  calcitriol   Capsule 0.5 MICROGram(s) Oral daily  carvedilol 6.25 milliGRAM(s) Oral every 12 hours  chlorhexidine 2% Cloths 1 Application(s) Topical daily  cholecalciferol 2000 Unit(s) Oral daily  cilostazol 100 milliGRAM(s) Oral two times a day  folic acid 1 milliGRAM(s) Oral daily  pantoprazole  Injectable 40 milliGRAM(s) IV Push daily  predniSONE   Tablet 30 milliGRAM(s) Oral daily    MEDICATIONS  (PRN):  acetaminophen     Tablet .. 650 milliGRAM(s) Oral every 6 hours PRN Temp greater or equal to 38C (100.4F), Mild Pain (1 - 3)  aluminum hydroxide/magnesium hydroxide/simethicone Suspension 30 milliLiter(s) Oral every 6 hours PRN Dyspepsia  HYDROmorphone  Injectable 1 milliGRAM(s) IV Push every 4 hours PRN Severe Pain (7 - 10)  HYDROmorphone  Injectable 0.5 milliGRAM(s) IV Push every 4 hours PRN Moderate Pain (4 - 6)  meclizine 25 milliGRAM(s) Oral every 12 hours PRN Dizziness  melatonin 3 milliGRAM(s) Oral at bedtime PRN Insomnia  polyethylene glycol 3350 17 Gram(s) Oral daily PRN Constipation  senna 2 Tablet(s) Oral at bedtime PRN Constipation      Vital Signs Last 24 Hrs  T(C): 37 (30 Jan 2023 12:05), Max: 37 (30 Jan 2023 12:05)  T(F): 98.6 (30 Jan 2023 12:05), Max: 98.6 (30 Jan 2023 12:05)  HR: 79 (30 Jan 2023 12:05) (71 - 83)  BP: 124/60 (30 Jan 2023 12:05) (101/59 - 129/60)  BP(mean): --  RR: 18 (30 Jan 2023 12:05) (17 - 18)  SpO2: 96% (30 Jan 2023 12:05) (94% - 97%)    Parameters below as of 30 Jan 2023 12:05  Patient On (Oxygen Delivery Method): nasal cannula      CAPILLARY BLOOD GLUCOSE        I&O's Summary      PHYSICAL EXAM:  CONSTITUTIONAL: NAD  EYES: PERRLA; conjunctiva and sclera clear  ENMT: Moist oral mucosa, no pharyngeal injection or exudates; normal dentition  NECK: Supple, no palpable masses; no thyromegaly  RESPIRATORY: Normal respiratory effort; lungs are clear to auscultation bilaterally  CARDIOVASCULAR: Regular rate and rhythm, normal S1 and S2, no murmur/rub/gallop; No lower extremity edema; Peripheral pulses are 2+ bilaterally; LUE edema and erythema at the IV access site.  ABDOMEN: Nontender to palpation, normoactive bowel sounds, no rebound/guarding; No hepatosplenomegaly  MUSCULOSKELETAL:  Normal gait; no clubbing or cyanosis of digits; no joint swelling or tenderness to palpation; limited ROM of RLE due to pain.  PSYCH: A+O to person, place, and time; affect appropriate  NEUROLOGY: CN 2-12 are intact and symmetric; no gross sensory deficits   SKIN: No rashes; no palpable lesions    LABS:                        7.1    3.95  )-----------( 104      ( 30 Jan 2023 05:30 )             22.1     01-30    136  |  104  |  74<H>  ----------------------------<  155<H>  3.6   |  19<L>  |  2.65<H>    Ca    8.4      30 Jan 2023 05:30  Phos  3.7     01-30  Mg     2.50     01-30    TPro  5.8<L>  /  Alb  3.0<L>  /  TBili  1.2  /  DBili  x   /  AST  130<H>  /  ALT  255<H>  /  AlkPhos  132<H>  01-30              RADIOLOGY & ADDITIONAL TESTS:    Imaging Personally Reviewed:    Care Discussed with Consultants/Other Providers:

## 2023-01-30 NOTE — PROGRESS NOTE ADULT - ASSESSMENT
83 yo M w/ PMH HTN, CKD, renal artery stenosis s/p stent, CAD (LAD 60%, OM2 60%, RCA ), PAD, MDS who presents with anemia and abnormal EKG. Cardiology called for NSTEMI.     #NSTEMI   Most likely type II given hx of CAD 2/2 anemia, cath in 2021. No anginal pain, EKG does show STD in I and II along with V5-6 which is new for patient. Very low suspicion for type I NSTEMI or PE.   -Transfuse PRBC for goal Hgb > 8   -Recommend echo   -Would recommend continuing plavix given patient's hx of renal stent but holding at this time given possible GIB   -c/w patient's atorvastatin and carvedilol   -Can trend troponin to peak     #Atrial Ectopy   Asymptomatic, likely in setting of anemia, reportedly Afib in outpatient but cannot view EKG, has not had Afib during admission   -Please obtain outside record of EKG to determine if patient had Afib   -Tele   -c/w patient's carvedilol  83 yo M w/ PMH HTN, CKD, renal artery stenosis s/p stent, CAD (LAD 60%, OM2 60%, RCA ), PAD, MDS who presents with anemia and abnormal EKG. Cardiology called for NSTEMI.     #NSTEMI   Most likely type II given hx of CAD 2/2 anemia, cath in 2021. No anginal pain, EKG does show STD in I and II along with V5-6 which is new for patient. Very low suspicion for type I NSTEMI or PE.   -Transfuse PRBC for goal Hgb > 8   -Recommend echo   -Would recommend continuing plavix given patient's hx of renal stent but holding at this time given possible GIB   -c/w patient's carvedilol   -Hold statin given elevated LFT   -Can trend troponin to peak     #Atrial Ectopy   Asymptomatic, likely in setting of anemia, reportedly Afib in outpatient but cannot view EKG, has not had Afib during admission   -Please obtain outside record of EKG to determine if patient had Afib   -Tele   -c/w patient's carvedilol

## 2023-01-30 NOTE — DISCHARGE NOTE PROVIDER - NSDCFUSCHEDAPPT_GEN_ALL_CORE_FT
Maksim Reddy  [facility]ician [facility]  INTMED [address]  Scheduled Appointment: 02/13/2023    Zita Mcmahon  [facility]ician [facility]  [facility]  Scheduled Appointment: 02/23/2023    Gabriel Reddy  [facility]ician [facility]  CARDIOLOGY [address]  Scheduled Appointment: 03/08/2023

## 2023-01-31 LAB
ALBUMIN SERPL ELPH-MCNC: 3 G/DL — LOW (ref 3.3–5)
ALP SERPL-CCNC: 141 U/L — HIGH (ref 40–120)
ALT FLD-CCNC: 249 U/L — HIGH (ref 4–41)
ANION GAP SERPL CALC-SCNC: 13 MMOL/L — SIGNIFICANT CHANGE UP (ref 7–14)
AST SERPL-CCNC: 112 U/L — HIGH (ref 4–40)
BILIRUB SERPL-MCNC: 1.6 MG/DL — HIGH (ref 0.2–1.2)
BUN SERPL-MCNC: 70 MG/DL — HIGH (ref 7–23)
CALCIUM SERPL-MCNC: 8.3 MG/DL — LOW (ref 8.4–10.5)
CHLORIDE SERPL-SCNC: 107 MMOL/L — SIGNIFICANT CHANGE UP (ref 98–107)
CO2 SERPL-SCNC: 17 MMOL/L — LOW (ref 22–31)
CREAT SERPL-MCNC: 2.31 MG/DL — HIGH (ref 0.5–1.3)
EGFR: 28 ML/MIN/1.73M2 — LOW
GLUCOSE SERPL-MCNC: 162 MG/DL — HIGH (ref 70–99)
HCT VFR BLD CALC: 23.1 % — LOW (ref 39–50)
HCT VFR BLD CALC: 24.7 % — LOW (ref 39–50)
HGB BLD-MCNC: 7.5 G/DL — LOW (ref 13–17)
HGB BLD-MCNC: 7.9 G/DL — LOW (ref 13–17)
MAGNESIUM SERPL-MCNC: 2.3 MG/DL — SIGNIFICANT CHANGE UP (ref 1.6–2.6)
MCHC RBC-ENTMCNC: 32 GM/DL — SIGNIFICANT CHANGE UP (ref 32–36)
MCHC RBC-ENTMCNC: 32.5 GM/DL — SIGNIFICANT CHANGE UP (ref 32–36)
MCHC RBC-ENTMCNC: 33.3 PG — SIGNIFICANT CHANGE UP (ref 27–34)
MCHC RBC-ENTMCNC: 34.1 PG — HIGH (ref 27–34)
MCV RBC AUTO: 104.2 FL — HIGH (ref 80–100)
MCV RBC AUTO: 105 FL — HIGH (ref 80–100)
NRBC # BLD: 0 /100 WBCS — SIGNIFICANT CHANGE UP (ref 0–0)
NRBC # BLD: 0 /100 WBCS — SIGNIFICANT CHANGE UP (ref 0–0)
NRBC # FLD: 0 K/UL — SIGNIFICANT CHANGE UP (ref 0–0)
NRBC # FLD: 0 K/UL — SIGNIFICANT CHANGE UP (ref 0–0)
PHOSPHATE SERPL-MCNC: 4 MG/DL — SIGNIFICANT CHANGE UP (ref 2.5–4.5)
PLATELET # BLD AUTO: 103 K/UL — LOW (ref 150–400)
PLATELET # BLD AUTO: 116 K/UL — LOW (ref 150–400)
POTASSIUM SERPL-MCNC: 4 MMOL/L — SIGNIFICANT CHANGE UP (ref 3.5–5.3)
POTASSIUM SERPL-SCNC: 4 MMOL/L — SIGNIFICANT CHANGE UP (ref 3.5–5.3)
PROT SERPL-MCNC: 5.9 G/DL — LOW (ref 6–8.3)
RBC # BLD: 2.2 M/UL — LOW (ref 4.2–5.8)
RBC # BLD: 2.37 M/UL — LOW (ref 4.2–5.8)
RBC # FLD: 24.7 % — HIGH (ref 10.3–14.5)
RBC # FLD: 25.2 % — HIGH (ref 10.3–14.5)
SODIUM SERPL-SCNC: 137 MMOL/L — SIGNIFICANT CHANGE UP (ref 135–145)
WBC # BLD: 4.26 K/UL — SIGNIFICANT CHANGE UP (ref 3.8–10.5)
WBC # BLD: 5.18 K/UL — SIGNIFICANT CHANGE UP (ref 3.8–10.5)
WBC # FLD AUTO: 4.26 K/UL — SIGNIFICANT CHANGE UP (ref 3.8–10.5)
WBC # FLD AUTO: 5.18 K/UL — SIGNIFICANT CHANGE UP (ref 3.8–10.5)

## 2023-01-31 PROCEDURE — 99233 SBSQ HOSP IP/OBS HIGH 50: CPT

## 2023-01-31 PROCEDURE — 93306 TTE W/DOPPLER COMPLETE: CPT | Mod: 26

## 2023-01-31 PROCEDURE — 99232 SBSQ HOSP IP/OBS MODERATE 35: CPT

## 2023-01-31 RX ADMIN — Medication 0.3 MILLIGRAM(S): at 12:28

## 2023-01-31 RX ADMIN — CHLORHEXIDINE GLUCONATE 1 APPLICATION(S): 213 SOLUTION TOPICAL at 12:28

## 2023-01-31 RX ADMIN — CARVEDILOL PHOSPHATE 6.25 MILLIGRAM(S): 80 CAPSULE, EXTENDED RELEASE ORAL at 05:55

## 2023-01-31 RX ADMIN — HYDROMORPHONE HYDROCHLORIDE 1 MILLIGRAM(S): 2 INJECTION INTRAMUSCULAR; INTRAVENOUS; SUBCUTANEOUS at 07:03

## 2023-01-31 RX ADMIN — Medication 1 MILLIGRAM(S): at 12:27

## 2023-01-31 RX ADMIN — CALCITRIOL 0.5 MICROGRAM(S): 0.5 CAPSULE ORAL at 12:27

## 2023-01-31 RX ADMIN — Medication 2000 UNIT(S): at 12:25

## 2023-01-31 RX ADMIN — CARVEDILOL PHOSPHATE 6.25 MILLIGRAM(S): 80 CAPSULE, EXTENDED RELEASE ORAL at 17:53

## 2023-01-31 RX ADMIN — Medication 100 MILLIGRAM(S): at 12:27

## 2023-01-31 RX ADMIN — CILOSTAZOL 100 MILLIGRAM(S): 100 TABLET ORAL at 05:56

## 2023-01-31 RX ADMIN — Medication 30 MILLIGRAM(S): at 05:56

## 2023-01-31 RX ADMIN — CILOSTAZOL 100 MILLIGRAM(S): 100 TABLET ORAL at 17:53

## 2023-01-31 RX ADMIN — HYDROMORPHONE HYDROCHLORIDE 1 MILLIGRAM(S): 2 INJECTION INTRAMUSCULAR; INTRAVENOUS; SUBCUTANEOUS at 17:53

## 2023-01-31 RX ADMIN — PANTOPRAZOLE SODIUM 40 MILLIGRAM(S): 20 TABLET, DELAYED RELEASE ORAL at 12:39

## 2023-01-31 NOTE — PROGRESS NOTE ADULT - SUBJECTIVE AND OBJECTIVE BOX
[facility] Division of Hospital Medicine  Jonathon Aldrich MD  Pager (ANGELO-F, 8A-5P): [phone]  Other Times:  [phone]    Patient is a 82y old  Male who presents with a chief complaint of Anemia (31 Jan 2023 07:39)    SUBJECTIVE / OVERNIGHT EVENTS:  Daughter by the bedside. Patient with still significant pain at the Rt foot from the gout.  Unable to ambulate due to pain. Left UE ecchymoses but no pain. No F/C, N/V, CP, SOB, Cough, lightheadedness, dizziness, abdominal pain, diarrhea, dysuria.    MEDICATIONS  (STANDING):  allopurinol 100 milliGRAM(s) Oral daily  calcitriol   Capsule 0.5 MICROGram(s) Oral daily  carvedilol 6.25 milliGRAM(s) Oral every 12 hours  chlorhexidine 2% Cloths 1 Application(s) Topical daily  cholecalciferol 2000 Unit(s) Oral daily  cilostazol 100 milliGRAM(s) Oral two times a day  colchicine 0.3 milliGRAM(s) Oral daily  folic acid 1 milliGRAM(s) Oral daily  pantoprazole  Injectable 40 milliGRAM(s) IV Push daily    MEDICATIONS  (PRN):  acetaminophen     Tablet .. 650 milliGRAM(s) Oral every 6 hours PRN Temp greater or equal to 38C (100.4F), Mild Pain (1 - 3)  aluminum hydroxide/magnesium hydroxide/simethicone Suspension 30 milliLiter(s) Oral every 6 hours PRN Dyspepsia  HYDROmorphone  Injectable 1 milliGRAM(s) IV Push every 4 hours PRN Severe Pain (7 - 10)  HYDROmorphone  Injectable 0.5 milliGRAM(s) IV Push every 4 hours PRN Moderate Pain (4 - 6)  meclizine 25 milliGRAM(s) Oral every 12 hours PRN Dizziness  melatonin 3 milliGRAM(s) Oral at bedtime PRN Insomnia  polyethylene glycol 3350 17 Gram(s) Oral daily PRN Constipation  senna 2 Tablet(s) Oral at bedtime PRN Constipation      Vital Signs Last 24 Hrs  T(C): 36.8 (31 Jan 2023 12:05), Max: 37.2 (31 Jan 2023 05:00)  T(F): 98.2 (31 Jan 2023 12:05), Max: 98.9 (31 Jan 2023 05:00)  HR: 87 (31 Jan 2023 12:05) (79 - 87)  BP: 106/53 (31 Jan 2023 12:05) (106/53 - 138/69)  BP(mean): --  RR: 18 (31 Jan 2023 12:05) (18 - 18)  SpO2: 97% (31 Jan 2023 12:05) (96% - 98%)    Parameters below as of 31 Jan 2023 12:05  Patient On (Oxygen Delivery Method): room air      CAPILLARY BLOOD GLUCOSE        I&O's Summary      PHYSICAL EXAM:  CONSTITUTIONAL: NAD  EYES: PERRLA; conjunctiva and sclera clear  ENMT: Moist oral mucosa, no pharyngeal injection or exudates; normal dentition  NECK: Supple, no palpable masses; no thyromegaly  RESPIRATORY: Normal respiratory effort; lungs are clear to auscultation bilaterally  CARDIOVASCULAR: Regular rate and rhythm, normal S1 and S2, no murmur/rub/gallop; No lower extremity edema; Peripheral pulses are 2+ bilaterally; LUE edema and erythema at the IV access site.  ABDOMEN: Nontender to palpation, normoactive bowel sounds, no rebound/guarding; No hepatosplenomegaly  MUSCULOSKELETAL:  Unable to assess gait; no clubbing or cyanosis of digits; no joint swelling or tenderness to palpation; limited ROM of RLE due to pain; Rt hallux erythematous, edema, tender to palpation.  PSYCH: A+O to person, place, and time; affect appropriate  NEUROLOGY: CN 2-12 are intact and symmetric; no gross sensory deficits   SKIN: No rashes; no palpable lesions    LABS:                        7.9    4.26  )-----------( 103      ( 31 Jan 2023 06:48 )             24.7     01-31    137  |  107  |  70<H>  ----------------------------<  162<H>  4.0   |  17<L>  |  2.31<H>    Ca    8.3<L>      31 Jan 2023 06:48  Phos  4.0     01-31  Mg     2.30     01-31    TPro  5.9<L>  /  Alb  3.0<L>  /  TBili  1.6<H>  /  DBili  x   /  AST  112<H>  /  ALT  249<H>  /  AlkPhos  141<H>  01-31              RADIOLOGY & ADDITIONAL TESTS:    Imaging Personally Reviewed:    Care Discussed with Consultants/Other Providers:

## 2023-01-31 NOTE — PROGRESS NOTE ADULT - ASSESSMENT
81 yo M w/ PMH HTN, CKD, renal artery stenosis s/p stent, CAD (LAD 60%, OM2 60%, RCA ), PAD, MDS who presents with anemia and abnormal EKG. Cardiology called for NSTEMI.     #NSTEMI   Most likely type II given hx of CAD 2/2 anemia, cath in 2021. No anginal pain, EKG does show STD in I and II along with V5-6 which is new for patient. Very low suspicion for type I NSTEMI or PE.   -Recommend echo   -Would recommend continuing plavix given patient's hx of renal stent but holding at this time given possible GIB   -c/w patient's carvedilol   -Hold statin given elevated LFT   -Can trend troponin to peak     #Atrial Ectopy   Asymptomatic, likely in setting of anemia, reportedly Afib in outpatient but cannot view EKG, has not had Afib during admission   -Please obtain outside record of EKG to determine if patient had Afib   -Tele   -c/w patient's carvedilol  81 yo M w/ PMH HTN, CKD, renal artery stenosis s/p stent, CAD (LAD 60%, OM2 60%, RCA ), PAD, MDS who presents with anemia and abnormal EKG. Cardiology called for NSTEMI.     #NSTEMI   Most likely type II given hx of CAD 2/2 anemia, cath in 2021. No anginal pain, EKG does show STD in I and II along with V5-6 which is new for patient. Very low suspicion for type I NSTEMI or PE.   -Recommend echo-moderate AS noted on echo, will need outpatient cardiology f/u with repeat echo   -Would recommend continuing plavix given patient's hx of renal stent but holding at this time given possible GIB   -c/w patient's carvedilol   -Hold statin given elevated LFT   -Can trend troponin to peak     #Atrial Ectopy   Asymptomatic, likely in setting of anemia, reportedly Afib in outpatient but cannot view EKG, has not had Afib during admission   -Please obtain outside record of EKG to determine if patient had Afib   -Tele   -c/w patient's carvedilol

## 2023-01-31 NOTE — PROGRESS NOTE ADULT - SUBJECTIVE AND OBJECTIVE BOX
Patient seen and examined at bedside.    Overnight Events:     No AEON     Reporting LE pain     Review Of Systems: No chest pain, shortness of breath, or palpitations            Current Meds:  acetaminophen     Tablet .. 650 milliGRAM(s) Oral every 6 hours PRN  allopurinol 100 milliGRAM(s) Oral daily  aluminum hydroxide/magnesium hydroxide/simethicone Suspension 30 milliLiter(s) Oral every 6 hours PRN  calcitriol   Capsule 0.5 MICROGram(s) Oral daily  carvedilol 6.25 milliGRAM(s) Oral every 12 hours  chlorhexidine 2% Cloths 1 Application(s) Topical daily  cholecalciferol 2000 Unit(s) Oral daily  cilostazol 100 milliGRAM(s) Oral two times a day  colchicine 0.3 milliGRAM(s) Oral daily  folic acid 1 milliGRAM(s) Oral daily  HYDROmorphone  Injectable 1 milliGRAM(s) IV Push every 4 hours PRN  HYDROmorphone  Injectable 0.5 milliGRAM(s) IV Push every 4 hours PRN  meclizine 25 milliGRAM(s) Oral every 12 hours PRN  melatonin 3 milliGRAM(s) Oral at bedtime PRN  pantoprazole  Injectable 40 milliGRAM(s) IV Push daily  polyethylene glycol 3350 17 Gram(s) Oral daily PRN  predniSONE   Tablet 30 milliGRAM(s) Oral daily  senna 2 Tablet(s) Oral at bedtime PRN      Vitals:  T(F): 98.9 (01-31), Max: 98.9 (01-31)  HR: 81 (01-31) (75 - 83)  BP: 125/60 (01-31) (101/59 - 138/69)  RR: 18 (01-31)  SpO2: 98% (01-31)  I&O's Summary      Physical Exam:  Appearance: In mild distress   Cardiovascular: RRR, S1, S2, no murmurs, rubs, or gallops; no edema; no JVD  Respiratory: Clear to auscultation bilaterally  Neurologic: Non-focal  Psychiatry: AAOx3, mood & affect appropriate                            7.9    4.26  )-----------( 103      ( 31 Jan 2023 06:48 )             24.7     01-30    136  |  104  |  74<H>  ----------------------------<  155<H>  3.6   |  19<L>  |  2.65<H>    Ca    8.4      30 Jan 2023 05:30  Phos  3.7     01-30  Mg     2.50     01-30    TPro  5.8<L>  /  Alb  3.0<L>  /  TBili  1.2  /  DBili  x   /  AST  130<H>  /  ALT  255<H>  /  AlkPhos  132<H>  01-30      CARDIAC MARKERS ( 28 Jan 2023 06:12 )  478 ng/L / x     / x     / x     / x     / x      CARDIAC MARKERS ( 27 Jan 2023 16:20 )  411 ng/L / x     / x     / x     / x     / x      CARDIAC MARKERS ( 27 Jan 2023 14:00 )  416 ng/L / x     / x     / x     / x     / x          Serum Pro-Brain Natriuretic Peptide: 09937 pg/mL (01-28 @ 06:12)  Serum Pro-Brain Natriuretic Peptide: 38027 pg/mL (01-27 @ 14:00)          New ECG(s):     Echo:    Stress Testing:     Cath:    Imaging:    Interpretation of Telemetry:

## 2023-01-31 NOTE — PROGRESS NOTE ADULT - SUBJECTIVE AND OBJECTIVE BOX
NEPHROLOGY     Patient seen and examined resting comfortably, reports right foot pain improving, denies sob, in no acute distress.     MEDICATIONS  (STANDING):  allopurinol 100 milliGRAM(s) Oral daily  calcitriol   Capsule 0.5 MICROGram(s) Oral daily  carvedilol 6.25 milliGRAM(s) Oral every 12 hours  chlorhexidine 2% Cloths 1 Application(s) Topical daily  cholecalciferol 2000 Unit(s) Oral daily  cilostazol 100 milliGRAM(s) Oral two times a day  colchicine 0.3 milliGRAM(s) Oral daily  folic acid 1 milliGRAM(s) Oral daily  pantoprazole  Injectable 40 milliGRAM(s) IV Push daily    VITALS:  T(C): , Max: 37.2 (01-31-23 @ 05:00)  T(F): , Max: 98.9 (01-31-23 @ 05:00)  HR: 87 (01-31-23 @ 12:05)  BP: 106/53 (01-31-23 @ 12:05)  RR: 18 (01-31-23 @ 12:05)  SpO2: 97% (01-31-23 @ 12:05)    I and O's:    Height (cm): 170.2 (01-31 @ 12:45)  Weight (kg): 89.1 (01-31 @ 12:45)  BMI (kg/m2): 30.8 (01-31 @ 12:45)  BSA (m2): 2.01 (01-31 @ 12:45)    PHYSICAL EXAM:  Constitutional: NAD, Alert  HEENT: NCAT, DMM  Neck: Supple, No JVD  Respiratory: CTA-b/l  Cardiovascular: s1s2  Gastrointestinal: BS+, soft, NT/ND  Extremities: 1+ b/l LE edema  Neurological: no focal deficits; strength grossly intact  Back: no CVAT b/l  Skin: (+)pallor; (+)erythema of R 1st toe    LABS:                        7.9    4.26  )-----------( 103      ( 31 Jan 2023 06:48 )             24.7     01-31    137  |  107  |  70<H>  ----------------------------<  162<H>  4.0   |  17<L>  |  2.31<H>    Ca    8.3<L>      31 Jan 2023 06:48  Phos  4.0     01-31  Mg     2.30     01-31    TPro  5.9<L>  /  Alb  3.0<L>  /  TBili  1.6<H>  /  DBili  x   /  AST  112<H>  /  ALT  249<H>  /  AlkPhos  141<H>  01-31

## 2023-01-31 NOTE — PROGRESS NOTE ADULT - PROBLEM SELECTOR PLAN 4
- ECG = Sinus rhythm w/ occasional PVCs and PACs at 99 bpm, QTc = 503  - [id number] score = 5  - A-fib likely of multifactorial etiology; infection, dehydration, anemia.  Unlikely CHF (pro-BNP unlikely of cardiac origin)  - receiving antibiotic, being hydrated and being transfused PRBCs  - has ~ II/VI systolic murmur (pronounced over aortic and mitral valve areas)  - being followed by Cardiology team (appreciated)  - continuing with reduced dose of carvedilol (from 25 mg to 6.25 mg), (reduced due to borderline low blood pressure)  - continuing w/ statin  - aspirin and Plavix on hold presently (pls d/w Cardiology team re restarting same)  - f/u TTE (ordered)

## 2023-01-31 NOTE — PROGRESS NOTE ADULT - ASSESSMENT
IMPRESSION: 82M w/ HTN, gout, prostate CA, USHA, and CKD4, 1/27/23 p/w anemia/new AFib    (1)CKD - stage 4, with GFR 20-30ml/min - largely due to renal artery stenosis  (2)JUAN RAMON - prerenal - improved relative to admission  (3)Anemia - ?GIB - GI on board - no endoscopic workup for now   (4)EP - rapid afib on admission  (5)Gout - s/p po steroids - started on colchicine 0.3mg po daily (ok with heme)    RECOMMEND:  (1)PRBCs per primary team/Heme/G  (2)No objection Colchicine 0.3mg po qd  (3)Dose new meds for GFR 20-30ml/min    MARIA DEL CARMEN CarrilloC  [facility]  [phone]    IMPRESSION: 82M w/ HTN, gout, prostate CA, USHA, and CKD4, 1/27/23 p/w anemia/new AFib    (1)CKD - stage 4, with GFR 20-30ml/min - largely due to renal artery stenosis  (2)JUAN RAMON - prerenal - improved relative to admission  (3)Anemia - ?GIB - GI on board - no endoscopic workup for now   (4)EP - rapid afib on admission  (5)Gout - s/p po steroids - started on colchicine 0.3mg po daily (ok with heme)    RECOMMEND:  (1)PRBCs per primary team/Heme/G  (2)No objection Colchicine 0.3mg po qd  (3)Dose new meds for GFR 20-30ml/min    Carlie Wilson NP-C  [facility]  [phone]       RENAL ATTENDING NOTE  Patient seen and examined with NP. Agree with assessment and plan as above.    Edis Cabral MD  [facility]  [phone]

## 2023-01-31 NOTE — PROGRESS NOTE ADULT - PROBLEM SELECTOR PLAN 2
Rt great toe with pain, erythema and edema  - ineffective improvement with Prednisone  - d/w Renal - ok for Colchicine. Started on 1/30.  - PT eval once pain is improved enough to ambulate.

## 2023-02-01 LAB
ALBUMIN SERPL ELPH-MCNC: 3 G/DL — LOW (ref 3.3–5)
ALP SERPL-CCNC: 134 U/L — HIGH (ref 40–120)
ALT FLD-CCNC: 201 U/L — HIGH (ref 4–41)
ANION GAP SERPL CALC-SCNC: 11 MMOL/L — SIGNIFICANT CHANGE UP (ref 7–14)
AST SERPL-CCNC: 47 U/L — HIGH (ref 4–40)
BILIRUB SERPL-MCNC: 1.6 MG/DL — HIGH (ref 0.2–1.2)
BUN SERPL-MCNC: 74 MG/DL — HIGH (ref 7–23)
CALCIUM SERPL-MCNC: 8.5 MG/DL — SIGNIFICANT CHANGE UP (ref 8.4–10.5)
CHLORIDE SERPL-SCNC: 107 MMOL/L — SIGNIFICANT CHANGE UP (ref 98–107)
CO2 SERPL-SCNC: 20 MMOL/L — LOW (ref 22–31)
CREAT SERPL-MCNC: 2.24 MG/DL — HIGH (ref 0.5–1.3)
EGFR: 29 ML/MIN/1.73M2 — LOW
GLUCOSE SERPL-MCNC: 168 MG/DL — HIGH (ref 70–99)
HCT VFR BLD CALC: 24.2 % — LOW (ref 39–50)
HGB BLD-MCNC: 7.6 G/DL — LOW (ref 13–17)
MCHC RBC-ENTMCNC: 31.4 GM/DL — LOW (ref 32–36)
MCHC RBC-ENTMCNC: 33.3 PG — SIGNIFICANT CHANGE UP (ref 27–34)
MCV RBC AUTO: 106.1 FL — HIGH (ref 80–100)
NRBC # BLD: 0 /100 WBCS — SIGNIFICANT CHANGE UP (ref 0–0)
NRBC # FLD: 0 K/UL — SIGNIFICANT CHANGE UP (ref 0–0)
PLATELET # BLD AUTO: 74 K/UL — LOW (ref 150–400)
POTASSIUM SERPL-MCNC: 4.3 MMOL/L — SIGNIFICANT CHANGE UP (ref 3.5–5.3)
POTASSIUM SERPL-SCNC: 4.3 MMOL/L — SIGNIFICANT CHANGE UP (ref 3.5–5.3)
PROT SERPL-MCNC: 6 G/DL — SIGNIFICANT CHANGE UP (ref 6–8.3)
RBC # BLD: 2.28 M/UL — LOW (ref 4.2–5.8)
RBC # FLD: SIGNIFICANT CHANGE UP (ref 10.3–14.5)
SODIUM SERPL-SCNC: 138 MMOL/L — SIGNIFICANT CHANGE UP (ref 135–145)
WBC # BLD: 4.61 K/UL — SIGNIFICANT CHANGE UP (ref 3.8–10.5)
WBC # FLD AUTO: 4.61 K/UL — SIGNIFICANT CHANGE UP (ref 3.8–10.5)

## 2023-02-01 PROCEDURE — 93971 EXTREMITY STUDY: CPT | Mod: 26

## 2023-02-01 PROCEDURE — 99232 SBSQ HOSP IP/OBS MODERATE 35: CPT | Mod: GC

## 2023-02-01 PROCEDURE — 99233 SBSQ HOSP IP/OBS HIGH 50: CPT

## 2023-02-01 PROCEDURE — 99232 SBSQ HOSP IP/OBS MODERATE 35: CPT

## 2023-02-01 RX ORDER — HEPARIN SODIUM 5000 [USP'U]/ML
5000 INJECTION INTRAVENOUS; SUBCUTANEOUS EVERY 8 HOURS
Refills: 0 | Status: DISCONTINUED | OUTPATIENT
Start: 2023-02-01 | End: 2023-02-07

## 2023-02-01 RX ORDER — COLCHICINE 0.6 MG
0.6 TABLET ORAL DAILY
Refills: 0 | Status: DISCONTINUED | OUTPATIENT
Start: 2023-02-01 | End: 2023-02-04

## 2023-02-01 RX ADMIN — HEPARIN SODIUM 5000 UNIT(S): 5000 INJECTION INTRAVENOUS; SUBCUTANEOUS at 22:46

## 2023-02-01 RX ADMIN — Medication 650 MILLIGRAM(S): at 20:17

## 2023-02-01 RX ADMIN — HYDROMORPHONE HYDROCHLORIDE 0.5 MILLIGRAM(S): 2 INJECTION INTRAMUSCULAR; INTRAVENOUS; SUBCUTANEOUS at 06:55

## 2023-02-01 RX ADMIN — HYDROMORPHONE HYDROCHLORIDE 1 MILLIGRAM(S): 2 INJECTION INTRAMUSCULAR; INTRAVENOUS; SUBCUTANEOUS at 15:22

## 2023-02-01 RX ADMIN — Medication 650 MILLIGRAM(S): at 06:12

## 2023-02-01 RX ADMIN — CARVEDILOL PHOSPHATE 6.25 MILLIGRAM(S): 80 CAPSULE, EXTENDED RELEASE ORAL at 06:13

## 2023-02-01 RX ADMIN — HYDROMORPHONE HYDROCHLORIDE 0.5 MILLIGRAM(S): 2 INJECTION INTRAMUSCULAR; INTRAVENOUS; SUBCUTANEOUS at 01:24

## 2023-02-01 RX ADMIN — Medication 1 MILLIGRAM(S): at 17:18

## 2023-02-01 RX ADMIN — HYDROMORPHONE HYDROCHLORIDE 1 MILLIGRAM(S): 2 INJECTION INTRAMUSCULAR; INTRAVENOUS; SUBCUTANEOUS at 15:37

## 2023-02-01 RX ADMIN — Medication 2000 UNIT(S): at 17:18

## 2023-02-01 RX ADMIN — PANTOPRAZOLE SODIUM 40 MILLIGRAM(S): 20 TABLET, DELAYED RELEASE ORAL at 17:17

## 2023-02-01 RX ADMIN — CARVEDILOL PHOSPHATE 6.25 MILLIGRAM(S): 80 CAPSULE, EXTENDED RELEASE ORAL at 17:17

## 2023-02-01 RX ADMIN — CALCITRIOL 0.5 MICROGRAM(S): 0.5 CAPSULE ORAL at 17:17

## 2023-02-01 RX ADMIN — Medication 650 MILLIGRAM(S): at 06:16

## 2023-02-01 RX ADMIN — CHLORHEXIDINE GLUCONATE 1 APPLICATION(S): 213 SOLUTION TOPICAL at 17:18

## 2023-02-01 RX ADMIN — CILOSTAZOL 100 MILLIGRAM(S): 100 TABLET ORAL at 20:17

## 2023-02-01 RX ADMIN — Medication 0.6 MILLIGRAM(S): at 17:19

## 2023-02-01 RX ADMIN — Medication 100 MILLIGRAM(S): at 17:19

## 2023-02-01 NOTE — PROGRESS NOTE ADULT - ASSESSMENT
81 yo M w/ PMH HTN, CKD, renal artery stenosis s/p stent, CAD (LAD 60%, OM2 60%, RCA ), PAD, MDS who presents with anemia and abnormal EKG. Cardiology called for NSTEMI.     #NSTEMI   Most likely type II given hx of CAD 2/2 anemia, cath in 2021. No anginal pain, EKG does show STD in I and II along with V5-6 which is new for patient. Very low suspicion for type I NSTEMI or PE.   -Recommend echo-moderate AS noted on echo, will need outpatient cardiology f/u with repeat echo   -Would recommend continuing plavix given patient's hx of renal stent but holding at this time given anemia/thrombocytopenia  -c/w patient's carvedilol   -Hold statin given elevated LFT       #Atrial Ectopy   Asymptomatic, likely in setting of anemia, reportedly Afib in outpatient but cannot view EKG, has not had Afib during admission though has had AT  -Tele   -c/w patient's carvedilol     will follow as needed    All Cardiology service information can be found 24/7 on [email or web address], password: [email or web address]

## 2023-02-01 NOTE — PROGRESS NOTE ADULT - SUBJECTIVE AND OBJECTIVE BOX
Overnight events noted      VITAL:  T(C): , Max: 37.8 (02-01-23 @ 04:40)  T(F): , Max: 100 (02-01-23 @ 04:40)  HR: 77 (02-01-23 @ 04:40)  BP: 127/99 (02-01-23 @ 04:40)  BP(mean): --  RR: 17 (02-01-23 @ 04:40)  SpO2: 96% (02-01-23 @ 04:40)  Wt(kg): --      PHYSICAL EXAM:  Constitutional: NAD, Alert  HEENT: NCAT, DMM  Neck: Supple, No JVD  Respiratory: CTA-b/l  Cardiovascular: s1s2  Gastrointestinal: BS+, soft, NT/ND  Extremities: 1+ b/l LE edema  Neurological: no focal deficits; strength grossly intact  Back: no CVAT b/l  Skin: (+)pallor; (+)erythema of R 1st toe        LABS:                        7.6    4.61  )-----------( 74       ( 01 Feb 2023 05:44 )             24.2     Na(138)/K(4.3)/Cl(107)/HCO3(20)/BUN(74)/Cr(2.24)Glu(168)/Ca(8.5)/Mg(--)/PO4(--)    02-01 @ 05:44  Na(137)/K(4.0)/Cl(107)/HCO3(17)/BUN(70)/Cr(2.31)Glu(162)/Ca(8.3)/Mg(2.30)/PO4(4.0)    01-31 @ 06:48  Na(136)/K(3.6)/Cl(104)/HCO3(19)/BUN(74)/Cr(2.65)Glu(155)/Ca(8.4)/Mg(2.50)/PO4(3.7)    01-30 @ 05:30        · Assessment	    IMPRESSION: 82M w/ HTN, gout, prostate CA, USHA, and CKD4, 1/27/23 p/w anemia/new AFib    (1)CKD - stage 4, with GFR 20-30ml/min - largely due to renal artery stenosis  (2)JUAN RAMON - prerenal - improved relative to admission  (3)Anemia - ?GIB - GI on board - no endoscopic workup for now   (4)EP - rapid afib on admission  (5)Gout - s/p po steroids - started on colchicine 0.3mg po daily (ok with heme)    RECOMMEND:  (1)PRBCs per primary team/Heme/G  (2)No objection Colchicine 0.3mg po qd  (3)Dose new meds for GFR 20-30ml/min                       VITAL:  T(C): , Max: 37.8 (02-01-23 @ 04:40)  T(F): , Max: 100 (02-01-23 @ 04:40)  HR: 77 (02-01-23 @ 04:40)  BP: 127/99 (02-01-23 @ 04:40)  BP(mean): --  RR: 17 (02-01-23 @ 04:40)  SpO2: 96% (02-01-23 @ 04:40)  Wt(kg): --      PHYSICAL EXAM:  Constitutional: NAD, Alert  HEENT: NCAT, DMM  Neck: Supple, No JVD  Respiratory: CTA-b/l  Cardiovascular: s1s2  Gastrointestinal: BS+, soft, NT/ND  Extremities: 1+ b/l LE edema  Neurological: no focal deficits; strength grossly intact  Back: no CVAT b/l  Skin: (+)pallor; (+)erythema of R 1st toe        LABS:                        7.6    4.61  )-----------( 74       ( 01 Feb 2023 05:44 )             24.2     Na(138)/K(4.3)/Cl(107)/HCO3(20)/BUN(74)/Cr(2.24)Glu(168)/Ca(8.5)/Mg(--)/PO4(--)    02-01 @ 05:44  Na(137)/K(4.0)/Cl(107)/HCO3(17)/BUN(70)/Cr(2.31)Glu(162)/Ca(8.3)/Mg(2.30)/PO4(4.0)    01-31 @ 06:48  Na(136)/K(3.6)/Cl(104)/HCO3(19)/BUN(74)/Cr(2.65)Glu(155)/Ca(8.4)/Mg(2.50)/PO4(3.7)    01-30 @ 05:30        · Assessment	    IMPRESSION: 82M w/ HTN, gout, prostate CA, USHA, and CKD4, 1/27/23 p/w anemia/new AFib    (1)CKD - stage 4, with GFR 20-30ml/min - largely due to renal artery stenosis  (2)JUAN RAMON - prerenal - improved relative to admission  (3)Anemia - ?GIB - GI on board - no endoscopic workup for now   (4)EP - rapid afib on admission  (5)Gout - s/p po steroids - started on colchicine 0.3mg po daily (ok with heme)    RECOMMEND:  (1)PRBCs per primary team/Heme/G  (2)No objection Colchicine 0.3mg po qd  (3)Dose new meds for GFR 20-30ml/min                   Resting comfortably, still complains of R foot pain     VITAL:  T(C): , Max: 37.8 (02-01-23 @ 04:40)  T(F): , Max: 100 (02-01-23 @ 04:40)  HR: 77 (02-01-23 @ 04:40)  BP: 127/99 (02-01-23 @ 04:40)  BP(mean): --  RR: 17 (02-01-23 @ 04:40)  SpO2: 96% (02-01-23 @ 04:40)  Wt(kg): --      PHYSICAL EXAM:  Constitutional: NAD, Alert  HEENT: NCAT, DMM  Neck: Supple, No JVD  Respiratory: CTA-b/l  Cardiovascular: s1s2  Gastrointestinal: BS+, soft, NT/ND  Extremities: 1+ b/l LE edema  Neurological: no focal deficits; strength grossly intact  Back: no CVAT b/l  Skin: (+)pallor; (+)erythema of R 1st toe        LABS:                        7.6    4.61  )-----------( 74       ( 01 Feb 2023 05:44 )             24.2     Na(138)/K(4.3)/Cl(107)/HCO3(20)/BUN(74)/Cr(2.24)Glu(168)/Ca(8.5)/Mg(--)/PO4(--)    02-01 @ 05:44  Na(137)/K(4.0)/Cl(107)/HCO3(17)/BUN(70)/Cr(2.31)Glu(162)/Ca(8.3)/Mg(2.30)/PO4(4.0)    01-31 @ 06:48  Na(136)/K(3.6)/Cl(104)/HCO3(19)/BUN(74)/Cr(2.65)Glu(155)/Ca(8.4)/Mg(2.50)/PO4(3.7)    01-30 @ 05:30          IMPRESSION: 82M w/ HTN, gout, prostate CA, USHA, and CKD4, 1/27/23 p/w anemia/new AFib    (1)CKD - stage 4, with GFR 20-30ml/min - largely due to renal artery stenosis  (2)JUAN RAMON - prerenal - improved relative to admission  (3)Anemia - ?GIB - GI on board - no endoscopic workup for now   (4)EP - rapid afib on admission  (5)Gout - s/p po steroids - started on colchicine 0.3mg po daily (ok with heme)    RECOMMEND:  (1)PRBCs per primary team/Heme/G  (2)No objection Colchicine 0.3mg po qd  (3)Dose new meds for GFR 20-30ml/min                   Resting comfortably, still complains of R foot pain     VITAL:  T(C): , Max: 37.8 (02-01-23 @ 04:40)  T(F): , Max: 100 (02-01-23 @ 04:40)  HR: 77 (02-01-23 @ 04:40)  BP: 127/99 (02-01-23 @ 04:40)  BP(mean): --  RR: 17 (02-01-23 @ 04:40)  SpO2: 96% (02-01-23 @ 04:40)  Wt(kg): --      PHYSICAL EXAM:  Constitutional: NAD, Alert  HEENT: NCAT, DMM  Neck: Supple, No JVD  Respiratory: CTA-b/l  Cardiovascular: s1s2  Gastrointestinal: BS+, soft, NT/ND  Extremities: 1+ b/l LE edema  Neurological: no focal deficits; strength grossly intact  Back: no CVAT b/l  Skin: (+)pallor; (+)erythema of R 1st toe        LABS:                        7.6    4.61  )-----------( 74       ( 01 Feb 2023 05:44 )             24.2     Na(138)/K(4.3)/Cl(107)/HCO3(20)/BUN(74)/Cr(2.24)Glu(168)/Ca(8.5)/Mg(--)/PO4(--)    02-01 @ 05:44  Na(137)/K(4.0)/Cl(107)/HCO3(17)/BUN(70)/Cr(2.31)Glu(162)/Ca(8.3)/Mg(2.30)/PO4(4.0)    01-31 @ 06:48  Na(136)/K(3.6)/Cl(104)/HCO3(19)/BUN(74)/Cr(2.65)Glu(155)/Ca(8.4)/Mg(2.50)/PO4(3.7)    01-30 @ 05:30          IMPRESSION: 82M w/ HTN, gout, prostate CA, USHA, and CKD4, 1/27/23 p/w anemia/new AFib    (1)CKD - stage 4, with GFR 20-30ml/min - largely due to renal artery stenosis  (2)JUAN RAMON - prerenal - improved relative to admission  (3)Anemia - ?GIB - GI on board - no endoscopic workup for now   (4)EP - rapid afib on admission  (5)Gout - s/p po steroids - started on colchicine 0.3mg po daily (ok with heme)    RECOMMEND:  (1)PRBCs per primary team/Heme/GI  (2)No objection Colchicine 0.3mg po qd  (3)Dose new meds for GFR 20-30ml/min      RENAL ATTENDING NOTE  Patient seen and examined with NP. Agree with assessment and plan as above.  still with foot pain - may benefit patient to increase Colchicine to 0.6qd at this point. Would not object from renal standpoint    Edis Cabral MD  [facility]  [phone]

## 2023-02-01 NOTE — PROGRESS NOTE ADULT - PROBLEM SELECTOR PLAN 1
- macrocytic.  Hgb = 6.3, MCV = 110.9, in the context of MDS  - s/p 3 units of p-RBCs  - anemia is likely of multifactorial etiology; chronic disease, blood loss, dietary deficiency  - ferritin elevated  - GI consulted and follg, per gi to start on regular diet, cont ppi  - monitor H&H and tranfuse prn if hb<7.5, rpt hb if less <7.5 transfuse prbc  - folic acid started  - Hem/Onc consulted, per heme hold revlimid  - IV infiltration of the PRBC transfusion on 1/30 - will elevate and monitor clinical status.

## 2023-02-01 NOTE — PROGRESS NOTE ADULT - ASSESSMENT
82M MDS, chronic diastolic CHF, HTN, Prostate CA s/p Prostectomy, Carotid Art stenosis s/p CEA, PAD, Renal Art Stenosis s/p stent, CKD, p/w Acute on chronic anemia from MDS c/b New Afib, acute Gout flare, JUAN RAMON on CKD.

## 2023-02-01 NOTE — PROGRESS NOTE ADULT - ASSESSMENT
83 yo M, w/ PMHx significant for MDS, Stage II diastolic dysfunction, HTN, HLD, Prostate cancer - s/p Prostatectomy, Carotid artery stenosis - s/p Endarterectomy, PAD, Renal artery stenosis, and CKD, and presented to the ED, after being sent by outpatient rheumatologist, secondary to new onset atrial fibrillation. Pt found to have pancytopenia, elevated TBili/transaminases, JUAN RAMON. GI consulted for anemia without overt bleeding.    #Pancytopenia: Pt denies any hematochezia, melena, BRBPR. Hgb 6.3 on admission, s/p 2U pRBC with inadequate response. Pancytopenia likely in setting of his MDS, low likelihood for underlying GI bleed  #MDS: On revlimid and allopurinol as outpatient  #Elevated Tbili, transaminases: may be 2/2 DILI (revlimid can cause toxicity in cholestatic pattern) in background of underlying existing hepatic steatosis (seen on past US)  #Elevated pro-BNP  #Elevated HST, NSTEMI  #JUAN RAMON  #New afib    Recommendations  - patient without evidence of GI bleeding at this time   - if counts remain stable and patient continues not to have any overt bleeding, then no absolute contraindication from GI standpoint to starting antiplatelet agent  - RUQ US w/ doppler    - trend CBC, CMP  - can consider endoscopy to evaluate drop in Hgb pending clinical course and optimization, but given absence of overt bleeding and other active issues, would defer at this time  - If liver tests continue to rise, would consult hepatology    All recommendations are tentative until note is attested by attending.     Brittney Drake, PGY5  Gastroenterology/Hepatology Fellow  Available on [employer] Teams  [phone] ([employer] Short Range Pager)  [phone] (Long Range Pager)    After 5pm, please contact the on-call GI fellow. [phone] 81 yo M, w/ PMHx significant for MDS, Stage II diastolic dysfunction, HTN, HLD, Prostate cancer - s/p Prostatectomy, Carotid artery stenosis - s/p Endarterectomy, PAD, Renal artery stenosis, and CKD, and presented to the ED, after being sent by outpatient rheumatologist, secondary to new onset atrial fibrillation. Pt found to have pancytopenia, elevated TBili/transaminases, JUAN RAMON. GI consulted for anemia without overt bleeding.    #Pancytopenia: Pt denies any hematochezia, melena, BRBPR. Hgb 6.3 on admission, s/p 2U pRBC with inadequate response. Pancytopenia likely in setting of his MDS, low likelihood for underlying GI bleed  #MDS: On revlimid and allopurinol as outpatient  #Elevated Tbili, transaminases: may be 2/2 DILI (revlimid can cause toxicity in cholestatic pattern) in background of underlying existing hepatic steatosis (seen on past US)  #Elevated pro-BNP  #Elevated HST, NSTEMI  #JUAN RAMON  #New afib    Recommendations  - patient without evidence of GI bleeding at this time   - if counts remain stable and patient continues not to have any overt bleeding, then no absolute contraindication from GI standpoint to starting antiplatelet agent  - can consider endoscopy to evaluate drop in Hgb pending clinical course and optimization, but given absence of overt bleeding and other active issues, would defer at this time  - If liver tests continue to rise, would consult hepatology    GI will sign off at this time. Please reconsult as needed.    All recommendations are tentative until note is attested by attending.     Brittney Drake, PGY5  Gastroenterology/Hepatology Fellow  Available on [employer] Teams  [phone] ([employer] Short Range Pager)  [phone] (Long Range Pager)    After 5pm, please contact the on-call GI fellow. [phone]

## 2023-02-01 NOTE — PROGRESS NOTE ADULT - SUBJECTIVE AND OBJECTIVE BOX
[facility] Progress Note    Interval Events:  No significant events      MEDICATIONS:  carvedilol 6.25 milliGRAM(s) Oral every 12 hours  cilostazol 100 milliGRAM(s) Oral two times a day  acetaminophen     Tablet .. 650 milliGRAM(s) Oral every 6 hours PRN  HYDROmorphone  Injectable 1 milliGRAM(s) IV Push every 4 hours PRN  HYDROmorphone  Injectable 0.5 milliGRAM(s) IV Push every 4 hours PRN  meclizine 25 milliGRAM(s) Oral every 12 hours PRN  melatonin 3 milliGRAM(s) Oral at bedtime PRN  aluminum hydroxide/magnesium hydroxide/simethicone Suspension 30 milliLiter(s) Oral every 6 hours PRN  pantoprazole  Injectable 40 milliGRAM(s) IV Push daily  polyethylene glycol 3350 17 Gram(s) Oral daily PRN  senna 2 Tablet(s) Oral at bedtime PRN  allopurinol 100 milliGRAM(s) Oral daily  colchicine 0.3 milliGRAM(s) Oral daily  calcitriol   Capsule 0.5 MICROGram(s) Oral daily  chlorhexidine 2% Cloths 1 Application(s) Topical daily  cholecalciferol 2000 Unit(s) Oral daily  folic acid 1 milliGRAM(s) Oral daily    PHYSICAL EXAM:  T(C): 36.3 (02-01-23 @ 12:05), Max: 37.8 (02-01-23 @ 04:40)  HR: 74 (02-01-23 @ 12:05) (74 - 81)  BP: 117/54 (02-01-23 @ 12:05) (117/54 - 140/69)  RR: 18 (02-01-23 @ 12:05) (17 - 20)  SpO2: 98% (02-01-23 @ 12:05) (96% - 100%)  Wt(kg): --  I&O's Summary    Appearance: No acute distress  HEENT:   mmm  Cardiovascular: Normal S1 S2, no elevated JVP, no murmurs, no edema  Respiratory: Lungs clear to auscultation	, good air movement  Psychiatry: mood & affect appropriate  Gastrointestinal:  soft nt    LABS:	 	  CBC Full  -  ( 01 Feb 2023 05:44 )  WBC Count : 4.61 K/uL  Hemoglobin : 7.6 g/dL  Hematocrit : 24.2 %  Platelet Count - Automated : 74 K/uL    02-01    138  |  107  |  74<H>  ----------------------------<  168<H>  4.3   |  20<L>  |  2.24<H>  01-31    137  |  107  |  70<H>  ----------------------------<  162<H>  4.0   |  17<L>  |  2.31<H>    Ca    8.5      01 Feb 2023 05:44  Ca    8.3<L>      31 Jan 2023 06:48  Phos  4.0     01-31  Mg     2.30     01-31    TPro  6.0  /  Alb  3.0<L>  /  TBili  1.6<H>  /  DBili  x   /  AST  47<H>  /  ALT  201<H>  /  AlkPhos  134<H>  02-01  TPro  5.9<L>  /  Alb  3.0<L>  /  TBili  1.6<H>  /  DBili  x   /  AST  112<H>  /  ALT  249<H>  /  AlkPhos  141<H>  01-31      proBNP:   Lipid Profile:   HgA1c:   TSH:     CARDIAC MARKERS:      TELEMETRY: 	  SR, PAT  ECG:  	  RADIOLOGY:  OTHER: 	    PREVIOUS DIAGNOSTIC TESTING:    [x] Echocardiogram: < from: Transthoracic Echocardiogram (01.31.23 @ 08:45) >  CONCLUSIONS:  1. Mitral annular calcification, otherwise normal mitral  valve. Mild-moderate mitral regurgitation.  2. Calcified trileaflet aortic valve with decreased  opening. Peak transaortic valve gradient equals 29 mm Hg,  mean transaortic valve gradient equals 20 mm Hg, estimated  aortic valve area equals 1.4 sqcm (by continuity equation),  aortic valve velocity time integral equals 59 cm,  consistent with moderate aortic stenosis. Minimal aortic  regurgitation.  3. Normal left ventricular internal dimensions and wall  thicknesses.  4. Normal left ventricular systolic function. No segmental  wall motion abnormalities.  5. Normal right ventricular size and function.    < end of copied text >    [ ] Catheterization:  [ ] Stress Test:

## 2023-02-01 NOTE — PROGRESS NOTE ADULT - SUBJECTIVE AND OBJECTIVE BOX
[facility] Division of Hospital Medicine  Jonathon Aldrich MD  Pager (M-F, 8A-5P): [phone]  Other Times:  [phone]    Patient is a 82y old  Male who presents with a chief complaint of Anemia (01 Feb 2023 14:13)    SUBJECTIVE / OVERNIGHT EVENTS:  Patient still with significant pain at right hallux - unable to weight bear from the pain. But overall improved. No F/C, N/V, CP, SOB, Cough, lightheadedness, dizziness, abdominal pain, diarrhea, dysuria.    MEDICATIONS  (STANDING):  allopurinol 100 milliGRAM(s) Oral daily  calcitriol   Capsule 0.5 MICROGram(s) Oral daily  carvedilol 6.25 milliGRAM(s) Oral every 12 hours  chlorhexidine 2% Cloths 1 Application(s) Topical daily  cholecalciferol 2000 Unit(s) Oral daily  cilostazol 100 milliGRAM(s) Oral two times a day  colchicine 0.3 milliGRAM(s) Oral daily  folic acid 1 milliGRAM(s) Oral daily  pantoprazole  Injectable 40 milliGRAM(s) IV Push daily    MEDICATIONS  (PRN):  acetaminophen     Tablet .. 650 milliGRAM(s) Oral every 6 hours PRN Temp greater or equal to 38C (100.4F), Mild Pain (1 - 3)  aluminum hydroxide/magnesium hydroxide/simethicone Suspension 30 milliLiter(s) Oral every 6 hours PRN Dyspepsia  HYDROmorphone  Injectable 1 milliGRAM(s) IV Push every 4 hours PRN Severe Pain (7 - 10)  HYDROmorphone  Injectable 0.5 milliGRAM(s) IV Push every 4 hours PRN Moderate Pain (4 - 6)  meclizine 25 milliGRAM(s) Oral every 12 hours PRN Dizziness  melatonin 3 milliGRAM(s) Oral at bedtime PRN Insomnia  polyethylene glycol 3350 17 Gram(s) Oral daily PRN Constipation  senna 2 Tablet(s) Oral at bedtime PRN Constipation      Vital Signs Last 24 Hrs  T(C): 36.3 (01 Feb 2023 12:05), Max: 37.8 (01 Feb 2023 04:40)  T(F): 97.4 (01 Feb 2023 12:05), Max: 100 (01 Feb 2023 04:40)  HR: 74 (01 Feb 2023 12:05) (74 - 81)  BP: 117/54 (01 Feb 2023 12:05) (117/54 - 140/69)  BP(mean): --  RR: 18 (01 Feb 2023 12:05) (17 - 20)  SpO2: 98% (01 Feb 2023 12:05) (96% - 100%)    Parameters below as of 01 Feb 2023 12:05  Patient On (Oxygen Delivery Method): nasal cannula      CAPILLARY BLOOD GLUCOSE        I&O's Summary      PHYSICAL EXAM:  CONSTITUTIONAL: NAD  EYES: PERRLA; conjunctiva and sclera clear  ENMT: Moist oral mucosa, no pharyngeal injection or exudates; normal dentition  NECK: Supple, no palpable masses; no thyromegaly  RESPIRATORY: Normal respiratory effort; lungs are clear to auscultation bilaterally  CARDIOVASCULAR: Regular rate and rhythm, normal S1 and S2, no murmur/rub/gallop; No lower extremity edema; Peripheral pulses are 2+ bilaterally; LUE edema and erythema at the IV access site.  ABDOMEN: Nontender to palpation, normoactive bowel sounds, no rebound/guarding; No hepatosplenomegaly  MUSCULOSKELETAL:  Unable to assess gait; no clubbing or cyanosis of digits; no joint swelling or tenderness to palpation; limited ROM of RLE due to pain; Rt hallux erythematous, edema, tender to palpation.  PSYCH: A+O to person, place, and time; affect appropriate  NEUROLOGY: CN 2-12 are intact and symmetric; no gross sensory deficits   SKIN: No rashes; no palpable lesions    LABS:                        7.6    4.61  )-----------( 74       ( 01 Feb 2023 05:44 )             24.2     02-01    138  |  107  |  74<H>  ----------------------------<  168<H>  4.3   |  20<L>  |  2.24<H>    Ca    8.5      01 Feb 2023 05:44  Phos  4.0     01-31  Mg     2.30     01-31    TPro  6.0  /  Alb  3.0<L>  /  TBili  1.6<H>  /  DBili  x   /  AST  47<H>  /  ALT  201<H>  /  AlkPhos  134<H>  02-01              RADIOLOGY & ADDITIONAL TESTS:    Imaging Personally Reviewed:    Care Discussed with Consultants/Other Providers:

## 2023-02-01 NOTE — PROGRESS NOTE ADULT - SUBJECTIVE AND OBJECTIVE BOX
Gastroenterology/Hepatology Progress Note      Interval Events:     Allergies:  No Known Allergies      Hospital Medications:  acetaminophen     Tablet .. 650 milliGRAM(s) Oral every 6 hours PRN  allopurinol 100 milliGRAM(s) Oral daily  aluminum hydroxide/magnesium hydroxide/simethicone Suspension 30 milliLiter(s) Oral every 6 hours PRN  calcitriol   Capsule 0.5 MICROGram(s) Oral daily  carvedilol 6.25 milliGRAM(s) Oral every 12 hours  chlorhexidine 2% Cloths 1 Application(s) Topical daily  cholecalciferol 2000 Unit(s) Oral daily  cilostazol 100 milliGRAM(s) Oral two times a day  colchicine 0.3 milliGRAM(s) Oral daily  folic acid 1 milliGRAM(s) Oral daily  HYDROmorphone  Injectable 1 milliGRAM(s) IV Push every 4 hours PRN  HYDROmorphone  Injectable 0.5 milliGRAM(s) IV Push every 4 hours PRN  meclizine 25 milliGRAM(s) Oral every 12 hours PRN  melatonin 3 milliGRAM(s) Oral at bedtime PRN  pantoprazole  Injectable 40 milliGRAM(s) IV Push daily  polyethylene glycol 3350 17 Gram(s) Oral daily PRN  senna 2 Tablet(s) Oral at bedtime PRN      ROS: 14 point ROS negative unless otherwise state in subjective    PHYSICAL EXAM:   Vital Signs:  Vital Signs Last 24 Hrs  T(C): 36.3 (01 Feb 2023 12:05), Max: 37.8 (01 Feb 2023 04:40)  T(F): 97.4 (01 Feb 2023 12:05), Max: 100 (01 Feb 2023 04:40)  HR: 74 (01 Feb 2023 12:05) (74 - 81)  BP: 117/54 (01 Feb 2023 12:05) (117/54 - 140/69)  BP(mean): --  RR: 18 (01 Feb 2023 12:05) (17 - 20)  SpO2: 98% (01 Feb 2023 12:05) (96% - 100%)    Parameters below as of 01 Feb 2023 12:05  Patient On (Oxygen Delivery Method): nasal cannula      Daily     Daily     GENERAL:  No acute distress  HEENT:  NCAT, no scleral icterus  CHEST: no resp distress  HEART:  RRR  ABDOMEN:  Soft, non-tender, non-distended, normoactive bowel sounds, no masses  EXTREMITIES:  No cyanosis, clubbing, or edema  SKIN:  No rash/erythema/ecchymoses/petechiae/wounds/abscess/warm/dry  NEURO:  Alert and oriented x 3, no asterixis, no tremor    LABS:                        7.6    4.61  )-----------( 74       ( 01 Feb 2023 05:44 )             24.2     Mean Cell Volume: 106.1 fL (02-01-23 @ 05:44)    02-01    138  |  107  |  74<H>  ----------------------------<  168<H>  4.3   |  20<L>  |  2.24<H>    Ca    8.5      01 Feb 2023 05:44  Phos  4.0     01-31  Mg     2.30     01-31    TPro  6.0  /  Alb  3.0<L>  /  TBili  1.6<H>  /  DBili  x   /  AST  47<H>  /  ALT  201<H>  /  AlkPhos  134<H>  02-01    LIVER FUNCTIONS - ( 01 Feb 2023 05:44 )  Alb: 3.0 g/dL / Pro: 6.0 g/dL / ALK PHOS: 134 U/L / ALT: 201 U/L / AST: 47 U/L / GGT: x                     Imaging:           Gastroenterology/Hepatology Progress Note    Interval Events: Hgb stable, no BMs in a number of days. No abdominal pain    Allergies:  No Known Allergies    Hospital Medications:  acetaminophen     Tablet .. 650 milliGRAM(s) Oral every 6 hours PRN  allopurinol 100 milliGRAM(s) Oral daily  aluminum hydroxide/magnesium hydroxide/simethicone Suspension 30 milliLiter(s) Oral every 6 hours PRN  calcitriol   Capsule 0.5 MICROGram(s) Oral daily  carvedilol 6.25 milliGRAM(s) Oral every 12 hours  chlorhexidine 2% Cloths 1 Application(s) Topical daily  cholecalciferol 2000 Unit(s) Oral daily  cilostazol 100 milliGRAM(s) Oral two times a day  colchicine 0.3 milliGRAM(s) Oral daily  folic acid 1 milliGRAM(s) Oral daily  HYDROmorphone  Injectable 1 milliGRAM(s) IV Push every 4 hours PRN  HYDROmorphone  Injectable 0.5 milliGRAM(s) IV Push every 4 hours PRN  meclizine 25 milliGRAM(s) Oral every 12 hours PRN  melatonin 3 milliGRAM(s) Oral at bedtime PRN  pantoprazole  Injectable 40 milliGRAM(s) IV Push daily  polyethylene glycol 3350 17 Gram(s) Oral daily PRN  senna 2 Tablet(s) Oral at bedtime PRN      ROS: 14 point ROS negative unless otherwise state in subjective    PHYSICAL EXAM:   Vital Signs:  Vital Signs Last 24 Hrs  T(C): 36.3 (01 Feb 2023 12:05), Max: 37.8 (01 Feb 2023 04:40)  T(F): 97.4 (01 Feb 2023 12:05), Max: 100 (01 Feb 2023 04:40)  HR: 74 (01 Feb 2023 12:05) (74 - 81)  BP: 117/54 (01 Feb 2023 12:05) (117/54 - 140/69)  BP(mean): --  RR: 18 (01 Feb 2023 12:05) (17 - 20)  SpO2: 98% (01 Feb 2023 12:05) (96% - 100%)    Parameters below as of 01 Feb 2023 12:05  Patient On (Oxygen Delivery Method): nasal cannula      Daily     Daily     GENERAL:  No acute distress, on NC  HEENT:  NCAT, no scleral icterus  CHEST: no resp distress  HEART:  RRR  ABDOMEN:  Soft, non-tender, non-distended   EXTREMITIES:  No cyanosis, clubbing. +LE erythema  SKIN:  No rash/erythema/ecchymoses   NEURO:  Alert and oriented x 3     LABS:                        7.6    4.61  )-----------( 74       ( 01 Feb 2023 05:44 )             24.2     Mean Cell Volume: 106.1 fL (02-01-23 @ 05:44)    02-01    138  |  107  |  74<H>  ----------------------------<  168<H>  4.3   |  20<L>  |  2.24<H>    Ca    8.5      01 Feb 2023 05:44  Phos  4.0     01-31  Mg     2.30     01-31    TPro  6.0  /  Alb  3.0<L>  /  TBili  1.6<H>  /  DBili  x   /  AST  47<H>  /  ALT  201<H>  /  AlkPhos  134<H>  02-01    LIVER FUNCTIONS - ( 01 Feb 2023 05:44 )  Alb: 3.0 g/dL / Pro: 6.0 g/dL / ALK PHOS: 134 U/L / ALT: 201 U/L / AST: 47 U/L / GGT: x             Imaging:  reviewed           Gastroenterology/Hepatology Progress Note    Interval Events: Hgb stable, no BMs in a number of days. No abdominal pain    Allergies:  No Known Allergies    Hospital Medications:  acetaminophen     Tablet .. 650 milliGRAM(s) Oral every 6 hours PRN  allopurinol 100 milliGRAM(s) Oral daily  aluminum hydroxide/magnesium hydroxide/simethicone Suspension 30 milliLiter(s) Oral every 6 hours PRN  calcitriol   Capsule 0.5 MICROGram(s) Oral daily  carvedilol 6.25 milliGRAM(s) Oral every 12 hours  chlorhexidine 2% Cloths 1 Application(s) Topical daily  cholecalciferol 2000 Unit(s) Oral daily  cilostazol 100 milliGRAM(s) Oral two times a day  colchicine 0.3 milliGRAM(s) Oral daily  folic acid 1 milliGRAM(s) Oral daily  HYDROmorphone  Injectable 1 milliGRAM(s) IV Push every 4 hours PRN  HYDROmorphone  Injectable 0.5 milliGRAM(s) IV Push every 4 hours PRN  meclizine 25 milliGRAM(s) Oral every 12 hours PRN  melatonin 3 milliGRAM(s) Oral at bedtime PRN  pantoprazole  Injectable 40 milliGRAM(s) IV Push daily  polyethylene glycol 3350 17 Gram(s) Oral daily PRN  senna 2 Tablet(s) Oral at bedtime PRN      ROS: 14 point ROS negative unless otherwise state in subjective    PHYSICAL EXAM:   Vital Signs:  Vital Signs Last 24 Hrs  T(C): 36.3 (01 Feb 2023 12:05), Max: 37.8 (01 Feb 2023 04:40)  T(F): 97.4 (01 Feb 2023 12:05), Max: 100 (01 Feb 2023 04:40)  HR: 74 (01 Feb 2023 12:05) (74 - 81)  BP: 117/54 (01 Feb 2023 12:05) (117/54 - 140/69)  BP(mean): --  RR: 18 (01 Feb 2023 12:05) (17 - 20)  SpO2: 98% (01 Feb 2023 12:05) (96% - 100%)    Parameters below as of 01 Feb 2023 12:05  Patient On (Oxygen Delivery Method): nasal cannula      Daily     Daily     GENERAL:  No acute distress, on NC  HEENT:  NCAT, no scleral icterus  CHEST: no resp distress  HEART:  RRR  ABDOMEN:  Soft, non-tender, non-distended   EXTREMITIES:  No cyanosis, clubbing. +LE erythema  SKIN:  No rash/erythema/ecchymoses   NEURO:  Alert and oriented x 3     LABS:                        7.6    4.61  )-----------( 74       ( 01 Feb 2023 05:44 )             24.2     Mean Cell Volume: 106.1 fL (02-01-23 @ 05:44)    02-01    138  |  107  |  74<H>  ----------------------------<  168<H>  4.3   |  20<L>  |  2.24<H>    Ca    8.5      01 Feb 2023 05:44  Phos  4.0     01-31  Mg     2.30     01-31    TPro  6.0  /  Alb  3.0<L>  /  TBili  1.6<H>  /  DBili  x   /  AST  47<H>  /  ALT  201<H>  /  AlkPhos  134<H>  02-01    LIVER FUNCTIONS - ( 01 Feb 2023 05:44 )  Alb: 3.0 g/dL / Pro: 6.0 g/dL / ALK PHOS: 134 U/L / ALT: 201 U/L / AST: 47 U/L / GGT: x

## 2023-02-01 NOTE — PROGRESS NOTE ADULT - PROBLEM SELECTOR PLAN 4
- ECG = Sinus rhythm w/ occasional PVCs and PACs at 99 bpm, QTc = 503  - [id number] score = 5  - A-fib likely of multifactorial etiology; infection, dehydration, anemia.  Unlikely CHF (pro-BNP unlikely of cardiac origin)  - receiving antibiotic, being hydrated and being transfused PRBCs  - has ~ II/VI systolic murmur (pronounced over aortic and mitral valve areas)  - being followed by Cardiology team (appreciated)  - continuing with reduced dose of carvedilol (from 25 mg to 6.25 mg), (reduced due to borderline low blood pressure)  - continuing w/ statin  - aspirin and Plavix on hold presently (pls d/w Cardiology team re restarting same)  - f/u TTE (ordered)

## 2023-02-02 ENCOUNTER — RESULT REVIEW (OUTPATIENT)
Age: 83
End: 2023-02-02

## 2023-02-02 DIAGNOSIS — L08.9 LOCAL INFECTION OF THE SKIN AND SUBCUTANEOUS TISSUE, UNSPECIFIED: ICD-10-CM

## 2023-02-02 LAB
ANION GAP SERPL CALC-SCNC: 13 MMOL/L — SIGNIFICANT CHANGE UP (ref 7–14)
APPEARANCE UR: ABNORMAL
BACTERIA # UR AUTO: NEGATIVE — SIGNIFICANT CHANGE UP
BILIRUB UR-MCNC: NEGATIVE — SIGNIFICANT CHANGE UP
BLD GP AB SCN SERPL QL: NEGATIVE — SIGNIFICANT CHANGE UP
BUN SERPL-MCNC: 74 MG/DL — HIGH (ref 7–23)
CALCIUM SERPL-MCNC: 8.4 MG/DL — SIGNIFICANT CHANGE UP (ref 8.4–10.5)
CHLORIDE SERPL-SCNC: 109 MMOL/L — HIGH (ref 98–107)
CO2 SERPL-SCNC: 19 MMOL/L — LOW (ref 22–31)
COLOR SPEC: YELLOW — SIGNIFICANT CHANGE UP
CREAT SERPL-MCNC: 2.3 MG/DL — HIGH (ref 0.5–1.3)
CRP SERPL-MCNC: 40.4 MG/L — HIGH
CULTURE RESULTS: SIGNIFICANT CHANGE UP
CULTURE RESULTS: SIGNIFICANT CHANGE UP
DIFF PNL FLD: ABNORMAL
EGFR: 28 ML/MIN/1.73M2 — LOW
EPI CELLS # UR: 2 /HPF — SIGNIFICANT CHANGE UP (ref 0–5)
ERYTHROCYTE [SEDIMENTATION RATE] IN BLOOD: 13 MM/HR — SIGNIFICANT CHANGE UP (ref 1–15)
GLUCOSE SERPL-MCNC: 167 MG/DL — HIGH (ref 70–99)
GLUCOSE UR QL: ABNORMAL
HCT VFR BLD CALC: 23.3 % — LOW (ref 39–50)
HGB BLD-MCNC: 7.3 G/DL — LOW (ref 13–17)
HYALINE CASTS # UR AUTO: 2 /LPF — SIGNIFICANT CHANGE UP (ref 0–7)
KETONES UR-MCNC: NEGATIVE — SIGNIFICANT CHANGE UP
LEUKOCYTE ESTERASE UR-ACNC: NEGATIVE — SIGNIFICANT CHANGE UP
MAGNESIUM SERPL-MCNC: 2.4 MG/DL — SIGNIFICANT CHANGE UP (ref 1.6–2.6)
MCHC RBC-ENTMCNC: 31.3 GM/DL — LOW (ref 32–36)
MCHC RBC-ENTMCNC: 33.3 PG — SIGNIFICANT CHANGE UP (ref 27–34)
MCV RBC AUTO: 106.4 FL — HIGH (ref 80–100)
NITRITE UR-MCNC: NEGATIVE — SIGNIFICANT CHANGE UP
NRBC # BLD: 0 /100 WBCS — SIGNIFICANT CHANGE UP (ref 0–0)
NRBC # FLD: 0 K/UL — SIGNIFICANT CHANGE UP (ref 0–0)
PH UR: 6 — SIGNIFICANT CHANGE UP (ref 5–8)
PHOSPHATE SERPL-MCNC: 5.2 MG/DL — HIGH (ref 2.5–4.5)
PLATELET # BLD AUTO: 62 K/UL — LOW (ref 150–400)
POTASSIUM SERPL-MCNC: 4.5 MMOL/L — SIGNIFICANT CHANGE UP (ref 3.5–5.3)
POTASSIUM SERPL-SCNC: 4.5 MMOL/L — SIGNIFICANT CHANGE UP (ref 3.5–5.3)
PROT UR-MCNC: ABNORMAL
RBC # BLD: 2.19 M/UL — LOW (ref 4.2–5.8)
RBC # FLD: 24.3 % — HIGH (ref 10.3–14.5)
RBC CASTS # UR COMP ASSIST: 23 /HPF — HIGH (ref 0–4)
RH IG SCN BLD-IMP: NEGATIVE — SIGNIFICANT CHANGE UP
SODIUM SERPL-SCNC: 141 MMOL/L — SIGNIFICANT CHANGE UP (ref 135–145)
SP GR SPEC: 1.02 — SIGNIFICANT CHANGE UP (ref 1.01–1.05)
SPECIMEN SOURCE: SIGNIFICANT CHANGE UP
SPECIMEN SOURCE: SIGNIFICANT CHANGE UP
UROBILINOGEN FLD QL: ABNORMAL
WBC # BLD: 3.78 K/UL — LOW (ref 3.8–10.5)
WBC # FLD AUTO: 3.78 K/UL — LOW (ref 3.8–10.5)
WBC UR QL: 2 /HPF — SIGNIFICANT CHANGE UP (ref 0–5)

## 2023-02-02 PROCEDURE — 88304 TISSUE EXAM BY PATHOLOGIST: CPT | Mod: 26

## 2023-02-02 PROCEDURE — 99233 SBSQ HOSP IP/OBS HIGH 50: CPT

## 2023-02-02 RX ORDER — VANCOMYCIN HCL 1 G
1000 VIAL (EA) INTRAVENOUS ONCE
Refills: 0 | Status: COMPLETED | OUTPATIENT
Start: 2023-02-02 | End: 2023-02-02

## 2023-02-02 RX ORDER — CEFEPIME 1 G/1
1000 INJECTION, POWDER, FOR SOLUTION INTRAMUSCULAR; INTRAVENOUS EVERY 12 HOURS
Refills: 0 | Status: DISCONTINUED | OUTPATIENT
Start: 2023-02-03 | End: 2023-02-03

## 2023-02-02 RX ORDER — AMPICILLIN SODIUM AND SULBACTAM SODIUM 250; 125 MG/ML; MG/ML
INJECTION, POWDER, FOR SUSPENSION INTRAMUSCULAR; INTRAVENOUS
Refills: 0 | Status: DISCONTINUED | OUTPATIENT
Start: 2023-02-02 | End: 2023-02-02

## 2023-02-02 RX ORDER — CEFEPIME 1 G/1
INJECTION, POWDER, FOR SOLUTION INTRAMUSCULAR; INTRAVENOUS
Refills: 0 | Status: DISCONTINUED | OUTPATIENT
Start: 2023-02-02 | End: 2023-02-03

## 2023-02-02 RX ORDER — CEFEPIME 1 G/1
1000 INJECTION, POWDER, FOR SOLUTION INTRAMUSCULAR; INTRAVENOUS ONCE
Refills: 0 | Status: COMPLETED | OUTPATIENT
Start: 2023-02-02 | End: 2023-02-02

## 2023-02-02 RX ADMIN — Medication 250 MILLIGRAM(S): at 16:25

## 2023-02-02 RX ADMIN — Medication 650 MILLIGRAM(S): at 02:07

## 2023-02-02 RX ADMIN — Medication 3 MILLIGRAM(S): at 22:36

## 2023-02-02 RX ADMIN — PANTOPRAZOLE SODIUM 40 MILLIGRAM(S): 20 TABLET, DELAYED RELEASE ORAL at 18:08

## 2023-02-02 RX ADMIN — CHLORHEXIDINE GLUCONATE 1 APPLICATION(S): 213 SOLUTION TOPICAL at 18:10

## 2023-02-02 RX ADMIN — CEFEPIME 100 MILLIGRAM(S): 1 INJECTION, POWDER, FOR SOLUTION INTRAMUSCULAR; INTRAVENOUS at 15:01

## 2023-02-02 RX ADMIN — Medication 2000 UNIT(S): at 18:09

## 2023-02-02 RX ADMIN — HEPARIN SODIUM 5000 UNIT(S): 5000 INJECTION INTRAVENOUS; SUBCUTANEOUS at 13:48

## 2023-02-02 RX ADMIN — CILOSTAZOL 100 MILLIGRAM(S): 100 TABLET ORAL at 05:44

## 2023-02-02 RX ADMIN — CARVEDILOL PHOSPHATE 6.25 MILLIGRAM(S): 80 CAPSULE, EXTENDED RELEASE ORAL at 18:10

## 2023-02-02 RX ADMIN — CALCITRIOL 0.5 MICROGRAM(S): 0.5 CAPSULE ORAL at 18:10

## 2023-02-02 RX ADMIN — Medication 100 MILLIGRAM(S): at 18:10

## 2023-02-02 RX ADMIN — Medication 1 MILLIGRAM(S): at 18:10

## 2023-02-02 RX ADMIN — HEPARIN SODIUM 5000 UNIT(S): 5000 INJECTION INTRAVENOUS; SUBCUTANEOUS at 05:44

## 2023-02-02 RX ADMIN — HEPARIN SODIUM 5000 UNIT(S): 5000 INJECTION INTRAVENOUS; SUBCUTANEOUS at 22:35

## 2023-02-02 RX ADMIN — Medication 0.6 MILLIGRAM(S): at 18:09

## 2023-02-02 RX ADMIN — CILOSTAZOL 100 MILLIGRAM(S): 100 TABLET ORAL at 18:09

## 2023-02-02 RX ADMIN — HYDROMORPHONE HYDROCHLORIDE 1 MILLIGRAM(S): 2 INJECTION INTRAMUSCULAR; INTRAVENOUS; SUBCUTANEOUS at 14:36

## 2023-02-02 RX ADMIN — HYDROMORPHONE HYDROCHLORIDE 1 MILLIGRAM(S): 2 INJECTION INTRAMUSCULAR; INTRAVENOUS; SUBCUTANEOUS at 14:21

## 2023-02-02 NOTE — ADVANCED PRACTICE NURSE CONSULT - REASON FOR CONSULT
Patient seen on skin care rounds after wound care referral received for assessment of skin impairment and recommendations of topical management for bilateral foot wounds. Chart reviewed: WBC 3.78, H/H 7.3/23.3, platelets 62, INR 1.50, BMI 30.8, Xray right foot (+) tophi gout and Posterior tibial distribution vascular calcifications, Reyes 12. Patient H/O of MDS, chronic diastolic CHF, HTN, Prostate CA s/p Prostectomy, Carotid Art stenosis s/p CEA, PAD, Renal Art Stenosis s/p stent, CKD, p/w Acute on chronic anemia from MDS c/b New Afib, acute Gout flare, JUAN RAMON on CKD. Patient seen by nephrology for azotemia, Cardiology for NSTEMI, Hematology/Oncology for MDS, Infectious disease for neutropenic fever and GI for anemia.

## 2023-02-02 NOTE — H&P ADULT - NSHPSOURCEINFORD_GEN_ALL_CORE
PROCEDURE: US Thyroid.



TECHNIQUE: Multiple real-time grayscale images were obtained of

the thyroid in various projections.



INDICATION:  Thyroid mass.



COMPARISON with study date 09/12/2016.



Dominant hypoechoic right lobe mass is well-defined, vascularized

and solid without calcification. It measures 3.8 x 1.5 x 2.9 cm,

previously 1.6 x 1.4 x 1.4 cm. This appears to have been biopsied

back in 2016; however, given its significant increase in size,

consideration for resampling with fine-needle aspiration is

suggested for this TI-RADS 4 lesion. The left thyroid lobe

contains some tiny cyst in the lower pole, has benign findings,

otherwise normal. 



IMPRESSION: Large TI-RADS 4 hypoechoic solid vascularized right

thyroid lobe masses 3.8 cm today and previously measured 1.6 cm.

Fine-needle aspiration biopsy recommended. 



Dictated by: 



  Dictated on workstation # [id number]

## 2023-02-02 NOTE — PROGRESS NOTE ADULT - PROBLEM SELECTOR PLAN 1
Had treated with assumption of Acute Gout for foot pain and erythema - was on Prednisone and Colchicine with minimal response. However, prior to Prednisone/Colchicine, he was already on Cefepime for some period of time.  - Given fever overnight, concern for foot infection.  - however, recent Foot xray did not yield obvious infectious etiology.  - podiatry consult  - D/C Colchicine  - pain control  - initiate Cefepime/Vanco IV after BCx, UA/Ucx, and CXR are obtained.

## 2023-02-02 NOTE — PROGRESS NOTE ADULT - PROBLEM SELECTOR PLAN 2
- macrocytic.  Hgb = 6.3, MCV = 110.9, in the context of MDS  - s/p 3 units of p-RBCs  - anemia is likely of multifactorial etiology; chronic disease, blood loss, dietary deficiency  - ferritin elevated  - GI consulted - on regular diet, cont ppi  - monitor H&H and tranfuse prn if hb<7.5, rpt hb if less <7.5 transfuse prbc  - folic acid started  - Hem/Onc consulted, per heme hold revlimid  - IV infiltration of the PRBC transfusion on 1/30 - will elevate and monitor clinical status.  - another 1u PRBC transfusion on 2/2.

## 2023-02-02 NOTE — PROGRESS NOTE ADULT - SUBJECTIVE AND OBJECTIVE BOX
[facility] Division of Hospital Medicine  Jonathon Aldrich MD  Pager (ANGEL[facility], 8A-5P): [phone]  Other Times:  [phone]    Patient is a 82y old  Male who presents with a chief complaint of Anemia (02 Feb 2023 08:38)    SUBJECTIVE / OVERNIGHT EVENTS:  Episode of fever overnight Tm 102. Patient unable to recall if he had febrile episode but still with significant discomfort of the Rt foot. No chills, N/V, CP, SOB, Cough, lightheadedness, dizziness, abdominal pain, diarrhea, dysuria.    MEDICATIONS  (STANDING):  allopurinol 100 milliGRAM(s) Oral daily  ampicillin/sulbactam  IVPB      calcitriol   Capsule 0.5 MICROGram(s) Oral daily  carvedilol 6.25 milliGRAM(s) Oral every 12 hours  chlorhexidine 2% Cloths 1 Application(s) Topical daily  cholecalciferol 2000 Unit(s) Oral daily  cilostazol 100 milliGRAM(s) Oral two times a day  colchicine 0.6 milliGRAM(s) Oral daily  folic acid 1 milliGRAM(s) Oral daily  heparin   Injectable 5000 Unit(s) SubCutaneous every 8 hours  pantoprazole  Injectable 40 milliGRAM(s) IV Push daily    MEDICATIONS  (PRN):  acetaminophen     Tablet .. 650 milliGRAM(s) Oral every 6 hours PRN Temp greater or equal to 38C (100.4F), Mild Pain (1 - 3)  aluminum hydroxide/magnesium hydroxide/simethicone Suspension 30 milliLiter(s) Oral every 6 hours PRN Dyspepsia  HYDROmorphone  Injectable 1 milliGRAM(s) IV Push every 4 hours PRN Severe Pain (7 - 10)  HYDROmorphone  Injectable 0.5 milliGRAM(s) IV Push every 4 hours PRN Moderate Pain (4 - 6)  meclizine 25 milliGRAM(s) Oral every 12 hours PRN Dizziness  melatonin 3 milliGRAM(s) Oral at bedtime PRN Insomnia  polyethylene glycol 3350 17 Gram(s) Oral daily PRN Constipation  senna 2 Tablet(s) Oral at bedtime PRN Constipation      Vital Signs Last 24 Hrs  T(C): 36.8 (02 Feb 2023 13:05), Max: 38.7 (01 Feb 2023 19:38)  T(F): 98.2 (02 Feb 2023 13:05), Max: 101.7 (01 Feb 2023 19:38)  HR: 119 (02 Feb 2023 13:05) (74 - 119)  BP: 123/62 (02 Feb 2023 13:05) (110/54 - 145/60)  BP(mean): 64 (02 Feb 2023 04:43) (64 - 64)  RR: 16 (02 Feb 2023 13:05) (15 - 17)  SpO2: 98% (02 Feb 2023 13:05) (95% - 99%)    Parameters below as of 02 Feb 2023 13:05  Patient On (Oxygen Delivery Method): room air      CAPILLARY BLOOD GLUCOSE        I&O's Summary    01 Feb 2023 07:01  -  02 Feb 2023 07:00  --------------------------------------------------------  IN: 375 mL / OUT: 600 mL / NET: -225 mL        PHYSICAL EXAM:  CONSTITUTIONAL: NAD  EYES: PERRLA; conjunctiva and sclera clear  ENMT: Moist oral mucosa, no pharyngeal injection or exudates; normal dentition  NECK: Supple, no palpable masses; no thyromegaly  RESPIRATORY: Normal respiratory effort; lungs are clear to auscultation bilaterally  CARDIOVASCULAR: Regular rate and rhythm, normal S1 and S2, no murmur/rub/gallop; No lower extremity edema; Peripheral pulses are 2+ bilaterally; LUE edema and erythema at the IV access site.  ABDOMEN: Nontender to palpation, normoactive bowel sounds, no rebound/guarding; No hepatosplenomegaly  MUSCULOSKELETAL:  Unable to assess gait; no clubbing or cyanosis of digits; no joint swelling or tenderness to palpation; limited ROM of RLE due to pain; Rt foot erythematous, edema, tender to palpation.  PSYCH: A+O to person, place, and time; affect appropriate  NEUROLOGY: CN 2-12 are intact and symmetric; no gross sensory deficits   SKIN: No rashes; no palpable lesions    LABS:                        7.3    3.78  )-----------( 62       ( 02 Feb 2023 06:35 )             23.3     02-02    141  |  109<H>  |  74<H>  ----------------------------<  167<H>  4.5   |  19<L>  |  2.30<H>    Ca    8.4      02 Feb 2023 06:35  Phos  5.2     02-02  Mg     2.40     02-02    TPro  6.0  /  Alb  3.0<L>  /  TBili  1.6<H>  /  DBili  x   /  AST  47<H>  /  ALT  201<H>  /  AlkPhos  134<H>  02-01              RADIOLOGY & ADDITIONAL TESTS:    Imaging Personally Reviewed:    Care Discussed with Consultants/Other Providers:

## 2023-02-02 NOTE — CONSULT NOTE ADULT - SUBJECTIVE AND OBJECTIVE BOX
Patient is a 82y old  Male who presents with a chief complaint of Anemia (02 Feb 2023 13:18)      HPI:  82 year old male, with past history significant for MDS, Stage II diastolic dysfunction, HTN, HLD, Prostate cancer - s/p Prostatectomy, Carotid artery stenosis - s/p Endarterectomy, PAD, Renal artery stenosis, and CKD, and presented to the ED, after being sent by outpatient rheumatologist, secondary to new onset atrial fibrillation.  Seen and evaluated at bedside; ill appearing, but in NAD.  Confirms being sent in by outpatient provider due to dysrhythmia, after presenting to undergo blood transfusion.  Patient reports complete loss of appetite and insomnia since taking Lenalidomide, which was prescribed ~ one month ago.  Has not noted any signs of blood in the urine or stools.  No hematemesis, hemoptysis or epistaxis.  States no chest pain, palpitations, shortness of breath, headache, dizziness or lightheadedness.  Complains of significant fatigue.  Has had some right sided abdominal pain.  Complains of erratic bowel movements; constipated at times for 2 to 3 days, then has diarrhea after taking stool softener.  Last bowel movement was approximately 2 days ago.  Reports severe lower back pain with difficulty ambulating due to pain of the legs, which is related to spinal stenosis, herniated discs and sciatica.    Vital signs upon ED presentation as follows: BP = 105/65, HR = 122, RR = 18, T = 36.7 C (98 F), O2 Sat = 99% on RA.  Diagnosed with Anemia and prescribed 2 units p-RBCs in the ED. (27 Jan 2023 21:49)      PAST MEDICAL & SURGICAL HISTORY:  HTN - Hypertension      Hypercholesterolemia      PC (prostate cancer)  s/p surgical resection 3 years ago      Gout      Aneurysm, ascending aorta      H/O prostatectomy      H/O carotid endarterectomy      H/O renal artery stenosis  s/p stent in Left RA      Status post cataract extraction, unspecified laterality          MEDICATIONS  (STANDING):  allopurinol 100 milliGRAM(s) Oral daily  calcitriol   Capsule 0.5 MICROGram(s) Oral daily  carvedilol 6.25 milliGRAM(s) Oral every 12 hours  cefepime   IVPB      chlorhexidine 2% Cloths 1 Application(s) Topical daily  cholecalciferol 2000 Unit(s) Oral daily  cilostazol 100 milliGRAM(s) Oral two times a day  colchicine 0.6 milliGRAM(s) Oral daily  folic acid 1 milliGRAM(s) Oral daily  heparin   Injectable 5000 Unit(s) SubCutaneous every 8 hours  pantoprazole  Injectable 40 milliGRAM(s) IV Push daily    MEDICATIONS  (PRN):  acetaminophen     Tablet .. 650 milliGRAM(s) Oral every 6 hours PRN Temp greater or equal to 38C (100.4F), Mild Pain (1 - 3)  aluminum hydroxide/magnesium hydroxide/simethicone Suspension 30 milliLiter(s) Oral every 6 hours PRN Dyspepsia  HYDROmorphone  Injectable 1 milliGRAM(s) IV Push every 4 hours PRN Severe Pain (7 - 10)  HYDROmorphone  Injectable 0.5 milliGRAM(s) IV Push every 4 hours PRN Moderate Pain (4 - 6)  meclizine 25 milliGRAM(s) Oral every 12 hours PRN Dizziness  melatonin 3 milliGRAM(s) Oral at bedtime PRN Insomnia  polyethylene glycol 3350 17 Gram(s) Oral daily PRN Constipation  senna 2 Tablet(s) Oral at bedtime PRN Constipation      Allergies    No Known Allergies    Intolerances        VITALS:    Vital Signs Last 24 Hrs  T(C): 36.8 (02 Feb 2023 20:05), Max: 37.4 (02 Feb 2023 04:43)  T(F): 98.2 (02 Feb 2023 20:05), Max: 99.4 (02 Feb 2023 04:43)  HR: 83 (02 Feb 2023 20:05) (74 - 119)  BP: 135/57 (02 Feb 2023 20:05) (110/54 - 135/57)  BP(mean): 64 (02 Feb 2023 04:43) (64 - 64)  RR: 17 (02 Feb 2023 20:05) (15 - 18)  SpO2: 97% (02 Feb 2023 20:05) (95% - 98%)    Parameters below as of 02 Feb 2023 20:05  Patient On (Oxygen Delivery Method): nasal cannula  O2 Flow (L/min): 3      LABS:                          7.3    3.78  )-----------( 62       ( 02 Feb 2023 06:35 )             23.3       02-02    141  |  109<H>  |  74<H>  ----------------------------<  167<H>  4.5   |  19<L>  |  2.30<H>    Ca    8.4      02 Feb 2023 06:35  Phos  5.2     02-02  Mg     2.40     02-02    TPro  6.0  /  Alb  3.0<L>  /  TBili  1.6<H>  /  DBili  x   /  AST  47<H>  /  ALT  201<H>  /  AlkPhos  134<H>  02-01      CAPILLARY BLOOD GLUCOSE              LOWER EXTREMITY PHYSICAL EXAM:    Vascular: DP/PT _/4, B/L, CFT <_ seconds B/L, Temperature gradient _, B/L.   Neuro: Epicritic sensation _ to the level of _, B/L.  Musculoskeletal/Ortho:  Skin:  Wound #1:   Location:  Size:  Depth:  Wound bed:   Drainage:   Odor:   Periwound:  Etiology:     RADIOLOGY & ADDITIONAL STUDIES:     Patient is a 82y old  Male who presents with a chief complaint of right foot pain       HPI:  82 year old male, with past history significant for MDS, Stage II diastolic dysfunction, HTN, HLD, Prostate cancer - s/p Prostatectomy, Carotid artery stenosis - s/p Endarterectomy, PAD, Renal artery stenosis, and CKD, and presented to the ED, after being sent by outpatient rheumatologist, secondary to new onset atrial fibrillation.  Seen and evaluated at bedside; ill appearing, but in NAD.  Confirms being sent in by outpatient provider due to dysrhythmia, after presenting to undergo blood transfusion.  Patient reports complete loss of appetite and insomnia since taking Lenalidomide, which was prescribed ~ one month ago.  Has not noted any signs of blood in the urine or stools.  No hematemesis, hemoptysis or epistaxis.  States no chest pain, palpitations, shortness of breath, headache, dizziness or lightheadedness.  Complains of significant fatigue.  Has had some right sided abdominal pain.  Complains of erratic bowel movements; constipated at times for 2 to 3 days, then has diarrhea after taking stool softener.  Last bowel movement was approximately 2 days ago.  Reports severe lower back pain with difficulty ambulating due to pain of the legs, which is related to spinal stenosis, herniated discs and sciatica.    Vital signs upon ED presentation as follows: BP = 105/65, HR = 122, RR = 18, T = 36.7 C (98 F), O2 Sat = 99% on RA.  Diagnosed with Anemia and prescribed 2 units p-RBCs in the ED. (27 Jan 2023 21:49)      PAST MEDICAL & SURGICAL HISTORY:  HTN - Hypertension      Hypercholesterolemia      PC (prostate cancer)  s/p surgical resection 3 years ago      Gout      Aneurysm, ascending aorta      H/O prostatectomy      H/O carotid endarterectomy      H/O renal artery stenosis  s/p stent in Left RA      Status post cataract extraction, unspecified laterality          MEDICATIONS  (STANDING):  allopurinol 100 milliGRAM(s) Oral daily  calcitriol   Capsule 0.5 MICROGram(s) Oral daily  carvedilol 6.25 milliGRAM(s) Oral every 12 hours  cefepime   IVPB      chlorhexidine 2% Cloths 1 Application(s) Topical daily  cholecalciferol 2000 Unit(s) Oral daily  cilostazol 100 milliGRAM(s) Oral two times a day  colchicine 0.6 milliGRAM(s) Oral daily  folic acid 1 milliGRAM(s) Oral daily  heparin   Injectable 5000 Unit(s) SubCutaneous every 8 hours  pantoprazole  Injectable 40 milliGRAM(s) IV Push daily    MEDICATIONS  (PRN):  acetaminophen     Tablet .. 650 milliGRAM(s) Oral every 6 hours PRN Temp greater or equal to 38C (100.4F), Mild Pain (1 - 3)  aluminum hydroxide/magnesium hydroxide/simethicone Suspension 30 milliLiter(s) Oral every 6 hours PRN Dyspepsia  HYDROmorphone  Injectable 1 milliGRAM(s) IV Push every 4 hours PRN Severe Pain (7 - 10)  HYDROmorphone  Injectable 0.5 milliGRAM(s) IV Push every 4 hours PRN Moderate Pain (4 - 6)  meclizine 25 milliGRAM(s) Oral every 12 hours PRN Dizziness  melatonin 3 milliGRAM(s) Oral at bedtime PRN Insomnia  polyethylene glycol 3350 17 Gram(s) Oral daily PRN Constipation  senna 2 Tablet(s) Oral at bedtime PRN Constipation      Allergies    No Known Allergies    Intolerances        VITALS:    Vital Signs Last 24 Hrs  T(C): 36.8 (02 Feb 2023 20:05), Max: 37.4 (02 Feb 2023 04:43)  T(F): 98.2 (02 Feb 2023 20:05), Max: 99.4 (02 Feb 2023 04:43)  HR: 83 (02 Feb 2023 20:05) (74 - 119)  BP: 135/57 (02 Feb 2023 20:05) (110/54 - 135/57)  BP(mean): 64 (02 Feb 2023 04:43) (64 - 64)  RR: 17 (02 Feb 2023 20:05) (15 - 18)  SpO2: 97% (02 Feb 2023 20:05) (95% - 98%)    Parameters below as of 02 Feb 2023 20:05  Patient On (Oxygen Delivery Method): nasal cannula  O2 Flow (L/min): 3      LABS:                          7.3    3.78  )-----------( 62       ( 02 Feb 2023 06:35 )             23.3       02-02    141  |  109<H>  |  74<H>  ----------------------------<  167<H>  4.5   |  19<L>  |  2.30<H>    Ca    8.4      02 Feb 2023 06:35  Phos  5.2     02-02  Mg     2.40     02-02    TPro  6.0  /  Alb  3.0<L>  /  TBili  1.6<H>  /  DBili  x   /  AST  47<H>  /  ALT  201<H>  /  AlkPhos  134<H>  02-01      CAPILLARY BLOOD GLUCOSE              LOWER EXTREMITY PHYSICAL EXAM:    Vascular: DP/PT 2/4, B/L, CFT <3 seconds B/L, Temperature gradient warm to cool B/L.   Neuro: Epicritic sensation to level of digits   Musculoskeletal/Ortho: 1st MPJ HAV, lesser digit contractures B/L  Skin: Right foot 1st MPJ and DIPJ localized erythema, no open wounds. Left foot with no signs of infection       RADIOLOGY & ADDITIONAL STUDIES:    < from: Xray Foot AP + Lateral + Oblique, Right (01.29.23 @ 19:36) >    ACC: [id number] EXAM:  XR FOOT COMP MIN 3 VIEWS RT   ORDERED BY: MARTHA ARNDT     PROCEDURE DATE:  01/29/2023          INTERPRETATION:  CLINICAL INDICATION: right foot pain    EXAM:  Frontal oblique lateral right foot from 1/29/2023 at 1936. Compared prior   study from 12/10/2016.    IMPRESSION:  Generalized soft tissue swelling. No tracking gas collections or   radiopaque foreign bodies or gross radiographic evidence for   osteomyelitis.    No fractures or dislocations.    Tarsometatarsal alignment maintained without evidence for a Lisfranc   injury.    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 Preserved remaining visualized joint spaces.    Plantar and posterior calcaneal enthesophytes.    Generalized osteopenia otherwise no discrete lytic or blastic lesions.    Posterior tibial distribution vascular calcifications.    --- End of Report ---            ALLA LYON MD; Attending Radiologist  This document has been electronically signed. Jan 30 2    < end of copied text >

## 2023-02-02 NOTE — PROGRESS NOTE ADULT - SUBJECTIVE AND OBJECTIVE BOX
Overnight events noted      VITAL:  T(C): , Max: 38.7 (02-01-23 @ 19:38)  T(F): , Max: 101.7 (02-01-23 @ 19:38)  HR: 74 (02-02-23 @ 04:43)  BP: 115/49 (02-02-23 @ 04:43)  BP(mean): 64 (02-02-23 @ 04:43)  RR: 15 (02-02-23 @ 04:43)  SpO2: 97% (02-02-23 @ 04:43)      PHYSICAL EXAM:  Constitutional: NAD, Alert  HEENT: NCAT, DMM  Neck: Supple, No JVD  Respiratory: CTA-b/l  Cardiovascular: s1s2  Gastrointestinal: BS+, soft, NT/ND  Extremities: 1+ b/l LE edema  Neurological: no focal deficits; strength grossly intact  Back: no CVAT b/l  Skin: (+)pallor; (+)erythema of R 1st toe      LABS:                        7.3    3.78  )-----------( 62       ( 02 Feb 2023 06:35 )             23.3     Na(141)/K(4.5)/Cl(109)/HCO3(19)/BUN(74)/Cr(2.30)Glu(167)/Ca(8.4)/Mg(2.40)/PO4(--)    02-02 @ 06:35  Na(138)/K(4.3)/Cl(107)/HCO3(20)/BUN(74)/Cr(2.24)Glu(168)/Ca(8.5)/Mg(--)/PO4(--)    02-01 @ 05:44  Na(137)/K(4.0)/Cl(107)/HCO3(17)/BUN(70)/Cr(2.31)Glu(162)/Ca(8.3)/Mg(2.30)/PO4(4.0)    01-31 @ 06:48      IMPRESSION: 82M w/ HTN, gout, prostate CA, MDS, USHA, and CKD4, 1/27/23 p/w anemia/new AFib    (1)Renal - CKD4 - USHA; resolved prerenally mediated JUAN RAMON from admission   (2)Anemia - Heme+GI on board - off Revlimid for now  (3)Metabolic acidosis - renally mediated - mild/okay for now  (4)EP - Cards on board - unclear if patient truly had AFib - not in AFib at present  (5)Gout - on Colchicine 0.6qd    RECOMMEND:  (1)Colchicine as ordered  (2)Dose new meds for GFR 20-30ml/min            Edis Cabral MD  [facility]  Office: [phone]  Cell: [phone]       still with b/l foot pain      VITAL:  T(C): , Max: 38.7 (02-01-23 @ 19:38)  T(F): , Max: 101.7 (02-01-23 @ 19:38)  HR: 74 (02-02-23 @ 04:43)  BP: 115/49 (02-02-23 @ 04:43)  BP(mean): 64 (02-02-23 @ 04:43)  RR: 15 (02-02-23 @ 04:43)  SpO2: 97% (02-02-23 @ 04:43)      PHYSICAL EXAM:  Constitutional: NAD, Alert  HEENT: NCAT, DMM  Neck: Supple, No JVD  Respiratory: CTA-b/l  Cardiovascular: s1s2  Gastrointestinal: BS+, soft, NT/ND  Extremities: 1+ b/l LE edema; (+)b/l foot tenderness  Neurological: no focal deficits; strength grossly intact  Back: no CVAT b/l      LABS:                        7.3    3.78  )-----------( 62       ( 02 Feb 2023 06:35 )             23.3     Na(141)/K(4.5)/Cl(109)/HCO3(19)/BUN(74)/Cr(2.30)Glu(167)/Ca(8.4)/Mg(2.40)/PO4(--)    02-02 @ 06:35  Na(138)/K(4.3)/Cl(107)/HCO3(20)/BUN(74)/Cr(2.24)Glu(168)/Ca(8.5)/Mg(--)/PO4(--)    02-01 @ 05:44  Na(137)/K(4.0)/Cl(107)/HCO3(17)/BUN(70)/Cr(2.31)Glu(162)/Ca(8.3)/Mg(2.30)/PO4(4.0)    01-31 @ 06:48      IMPRESSION: 82M w/ HTN, gout, prostate CA, MDS, USHA, and CKD4, 1/27/23 p/w anemia/new AFib    (1)Renal - CKD4 - USHA; resolved prerenally mediated JUAN RAMON from admission   (2)Anemia - Heme+GI on board - off Revlimid for now  (3)Metabolic acidosis - renally mediated - mild/okay for now  (4)EP - Cards on board - unclear if patient truly had AFib - not in AFib at present  (5)Foot pain - is this gout? High fever overnight - septic arthritis?    RECOMMEND:  (1)ID f/u regarding ?foot infection  (2)Colchicine as ordered  (3)Dose new meds for GFR 20-30ml/min            Edis Cabral MD  [facility]  Office: [phone]  Cell: [phone]

## 2023-02-02 NOTE — PROGRESS NOTE ADULT - PROBLEM SELECTOR PLAN 4
- ECG = Sinus rhythm w/ occasional PVCs and PACs at 99 bpm, QTc = 503  - [id number] score = 5  - A-fib likely of multifactorial etiology; infection, dehydration, anemia.  Unlikely CHF (pro-BNP unlikely of cardiac origin)  - receiving antibiotic, being hydrated and being transfused PRBCs  - has ~ II/VI systolic murmur (pronounced over aortic and mitral valve areas)  - being followed by Cardiology team (appreciated)  - continuing with reduced dose of carvedilol (from 25 mg to 6.25 mg), (reduced due to borderline low blood pressure)  - continuing w/ statin  - aspirin and Plavix on hold presently (pls d/w Cardiology team re restarting same)  - f/u TTE (ordered)

## 2023-02-02 NOTE — ADVANCED PRACTICE NURSE CONSULT - RECOMMEDATIONS
Recommend CHER/PVRs - monophasic doppler sounds, h/o PAD.    Recommend outpatient follow up with Podiatry.     Topical recommendations:     Right lateral fifth toe, left lateral malleolus and bilateral heels- Apply liquid barrier film daily, monitor for tissue type changes, continue to offload.     Continue low air loss bed therapy,  heel elevation with offloading boots, turn & reposition q2h with Z-flow fluidized pillow, continue moisture management with barrier creams, single breathable pad, continue measures to decrease friction/shear.   Plan discussed with patient.     Please contact Wound/Ostomy Care Service Line if we can be of further assistance (ext [phone]).

## 2023-02-02 NOTE — PROGRESS NOTE ADULT - PROBLEM SELECTOR PLAN 9
Pt's wife states pt has sinus congestion and request RX to RA(W).             Health Maintenance   Topic Date Due    Varicella vaccine (1 of 2 - 2-dose childhood series) 06/23/1984    HIV screen  06/23/1998    DTaP/Tdap/Td vaccine (1 - Tdap) 06/23/2002    Flu vaccine (1) 09/01/2020    Potassium monitoring  09/25/2021    Creatinine monitoring  09/25/2021    Hepatitis A vaccine  Aged Out    Hepatitis B vaccine  Aged Out    Hib vaccine  Aged Out    Meningococcal (ACWY) vaccine  Aged Out    Pneumococcal 0-64 years Vaccine  Aged Out             (applicable per patient's age: Cancer Screenings, Depression Screening, Fall Risk Screening, Immunizations)    LDL Cholesterol (mg/dL)   Date Value   09/25/2020 112     LDL Calculated (mg/dL)   Date Value   10/10/2018 113     AST (U/L)   Date Value   09/25/2020 35     ALT (U/L)   Date Value   09/25/2020 34     BUN (mg/dL)   Date Value   09/25/2020 14      (goal A1C is < 7)   (goal LDL is <100) need 30-50% reduction from baseline     BP Readings from Last 3 Encounters:   10/16/20 (!) 152/98   09/25/20 132/87   08/27/19 134/82    (goal /80)      All Future Testing planned in [facility]:  Lab Frequency Next Occurrence       Next Visit Date:  Future Appointments   Date Time Provider Amy Cifuentes   3/26/2021  1:15 PM Clementina Ventura MD [facility] [address]            Patient Active Problem List:     Hypertension     Hearing decreased     Atrial fibrillation (HCC)     Vitamin D deficiency     Hyperglycemia     SHERIE on CPAP - history of NAFLD  - has mild tenderness over the RUQ area  - T-bili = 1.5, ALP = 138, AST = 213, ALT = 195; elevations likely impacted by current medical state  - f/u abdominal ultrasound showed Cholelithiasis withoutultrasound evidence of acute cholecystitis. Right renal atrophy, unchanged. Small right pleural effusion..  - trend liver function in the Am

## 2023-02-02 NOTE — CONSULT NOTE ADULT - ASSESSMENT
- Pt was seen and evaluated.   - Afebrile,   - X-Ray: Cortical erosions noted on ?. No signs of gas in the ? first toe, foot, or leg.  - Right foot dorsal PIPJ erythema, right foot 1st mpj erythema, no open wounds. Left foot with no signs of infection   - Using Sterile suture removal kit and #15 blade, the wound was explored and stab incision was made to area of fluctuance to the right foot 1st MPJ to the level of subQ and not beyond, 5cc of tophi expressed, no probe to bone, no purulence.   - The wound was flushed and packed, and dressed with dry sterile dressing   - Pt tolerated the procedure well  - Right foot wound cultured  and sent to cytology   - Right foot tophi sent to pathology   - Ordered MRI of the right foot   - Pod Plan: Local wound care pending Rheum recs   - Discussed with attending.   82 y.o M w/ Right foot pain   - Pt was seen and evaluated.   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   - Right foot dorsal PIPJ erythema, right foot 1st mpj erythema, no open wounds. Left foot with no signs of infection   - Using Sterile suture removal kit and #15 blade, the wound was explored and stab incision was made to area of fluctuance to the right foot 1st MPJ to the level of subQ and not beyond, 5cc of tophi expressed, no probe to bone, no purulence.   - The wound was flushed and packed, and dressed with dry sterile dressing   - Pt tolerated the procedure well  - Right foot wound cultured  and sent to cytology   - Right foot tophi sent to pathology   - Ordered MRI of the right foot   - Recommend rheum consult   - Pod Plan: Local wound care pending Rheum recs   - Discussed with attending.   82 y.o M w/ Right foot pain   - Pt was seen and evaluated.   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   - Right foot dorsal PIPJ erythema, right foot 1st mpj erythema, no open wounds. Left foot with no signs of infection   - Using Sterile suture removal kit and #15 blade, the wound was explored and stab incision was made to area of fluctuance to the right foot 1st MPJ to the level of subQ and not beyond, 5cc of tophi expressed, no probe to bone, no purulence.   - The wound was flushed and packed, and dressed with dry sterile dressing   - Pt tolerated the procedure well  - Right foot wound cultured  and sent to cytology   - Right foot tophi sent to pathology   - Recommend rheum consult   - Pod Plan: Local wound care pending Rheum recs   - Discussed with attending.

## 2023-02-02 NOTE — ADVANCED PRACTICE NURSE CONSULT - ASSESSMENT
General: A&Ox4, obese with large abdominal pannus, continent of urine and stool. Blood transfusion in progress. Skin warm, dry,  adequate skin turgor, Blanchable erythema on bilateral heels.     Vascular: Bilateral lower extremities with scattered areas of hyper and hypopigmentation. Thickened toenails. Multiple wounds present. No temperature changes noted. Trace Edema. Capillary refill >3 seconds. Nonpalable DP/PT pulses, with monophasic doppler sounds. Bilateral lower legs with pain upon movement and/or light palpation.     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     Right lateral fifth toe arterial wound complicated by pressure measuring [id number]. Well demarcated, punched out in appearance. Periwound skin with erythema circumferentially extending 0.3cm. No s/s of soft tissue infection. No induration, no increased warmth. Goals of care: Monitor for tissue type changes, continue to offload.     Left lateral malleolus- arterial wound complicated by pressure measuring [id number] presenting with 100% purple maroon discoloration, well demarcated, regular borders, no tissue type changes. Periwound skin intact. No s/s of soft tissue infection. No induration, no increased warmth. Goals of care: Monitor for tissue type changes, continue to offload.     Patient continues to be at high risk for skin breakdown. On assessment patient on a low air loss surface, heel offloading boots in place, Z-flow positioning pillow utilized, moisture management in place with use of one incontinence pad. Turning and positioning Q2hrs.

## 2023-02-03 DIAGNOSIS — M10.9 GOUT, UNSPECIFIED: ICD-10-CM

## 2023-02-03 LAB
ALBUMIN SERPL ELPH-MCNC: 2.8 G/DL — LOW (ref 3.3–5)
ALP SERPL-CCNC: 152 U/L — HIGH (ref 40–120)
ALT FLD-CCNC: 371 U/L — HIGH (ref 4–41)
ANION GAP SERPL CALC-SCNC: 13 MMOL/L — SIGNIFICANT CHANGE UP (ref 7–14)
AST SERPL-CCNC: 106 U/L — HIGH (ref 4–40)
BASOPHILS # BLD AUTO: 0.06 K/UL — SIGNIFICANT CHANGE UP (ref 0–0.2)
BASOPHILS NFR BLD AUTO: 1.5 % — SIGNIFICANT CHANGE UP (ref 0–2)
BILIRUB SERPL-MCNC: 2.3 MG/DL — HIGH (ref 0.2–1.2)
BUN SERPL-MCNC: 72 MG/DL — HIGH (ref 7–23)
CALCIUM SERPL-MCNC: 8.7 MG/DL — SIGNIFICANT CHANGE UP (ref 8.4–10.5)
CHLORIDE SERPL-SCNC: 110 MMOL/L — HIGH (ref 98–107)
CLOSURE TME COLL+EPINEP BLD: SIGNIFICANT CHANGE UP K/UL (ref 150–400)
CO2 SERPL-SCNC: 18 MMOL/L — LOW (ref 22–31)
CREAT SERPL-MCNC: 2.17 MG/DL — HIGH (ref 0.5–1.3)
EGFR: 30 ML/MIN/1.73M2 — LOW
EOSINOPHIL # BLD AUTO: 0.22 K/UL — SIGNIFICANT CHANGE UP (ref 0–0.5)
EOSINOPHIL NFR BLD AUTO: 5.4 % — SIGNIFICANT CHANGE UP (ref 0–6)
GLUCOSE SERPL-MCNC: 187 MG/DL — HIGH (ref 70–99)
HAPTOGLOB SERPL-MCNC: 327 MG/DL — HIGH (ref 34–200)
HCT VFR BLD CALC: 26.3 % — LOW (ref 39–50)
HGB BLD-MCNC: 8.2 G/DL — LOW (ref 13–17)
IANC: 2.91 K/UL — SIGNIFICANT CHANGE UP (ref 1.8–7.4)
IMM GRANULOCYTES NFR BLD AUTO: 0.7 % — SIGNIFICANT CHANGE UP (ref 0–0.9)
LDH SERPL L TO P-CCNC: 201 U/L — SIGNIFICANT CHANGE UP (ref 135–225)
LYMPHOCYTES # BLD AUTO: 0.58 K/UL — LOW (ref 1–3.3)
LYMPHOCYTES # BLD AUTO: 14.4 % — SIGNIFICANT CHANGE UP (ref 13–44)
MAGNESIUM SERPL-MCNC: 2.5 MG/DL — SIGNIFICANT CHANGE UP (ref 1.6–2.6)
MCHC RBC-ENTMCNC: 31.2 GM/DL — LOW (ref 32–36)
MCHC RBC-ENTMCNC: 33.2 PG — SIGNIFICANT CHANGE UP (ref 27–34)
MCV RBC AUTO: 106.5 FL — HIGH (ref 80–100)
MONOCYTES # BLD AUTO: 0.24 K/UL — SIGNIFICANT CHANGE UP (ref 0–0.9)
MONOCYTES NFR BLD AUTO: 5.9 % — SIGNIFICANT CHANGE UP (ref 2–14)
NEUTROPHILS # BLD AUTO: 2.91 K/UL — SIGNIFICANT CHANGE UP (ref 1.8–7.4)
NEUTROPHILS NFR BLD AUTO: 72.1 % — SIGNIFICANT CHANGE UP (ref 43–77)
NRBC # BLD: 0 /100 WBCS — SIGNIFICANT CHANGE UP (ref 0–0)
NRBC # FLD: 0 K/UL — SIGNIFICANT CHANGE UP (ref 0–0)
PHOSPHATE SERPL-MCNC: 5 MG/DL — HIGH (ref 2.5–4.5)
PLATELET # BLD AUTO: 56 K/UL — LOW (ref 150–400)
POTASSIUM SERPL-MCNC: 4.2 MMOL/L — SIGNIFICANT CHANGE UP (ref 3.5–5.3)
POTASSIUM SERPL-SCNC: 4.2 MMOL/L — SIGNIFICANT CHANGE UP (ref 3.5–5.3)
PROT SERPL-MCNC: 6 G/DL — SIGNIFICANT CHANGE UP (ref 6–8.3)
RBC # BLD: 2.47 M/UL — LOW (ref 4.2–5.8)
RBC # BLD: 2.47 M/UL — LOW (ref 4.2–5.8)
RBC # FLD: 23.9 % — HIGH (ref 10.3–14.5)
RETICS #: 24.9 K/UL — LOW (ref 25–125)
RETICS/RBC NFR: 1 % — SIGNIFICANT CHANGE UP (ref 0.5–2.5)
SODIUM SERPL-SCNC: 141 MMOL/L — SIGNIFICANT CHANGE UP (ref 135–145)
VANCOMYCIN FLD-MCNC: 6.9 UG/ML — SIGNIFICANT CHANGE UP
WBC # BLD: 4.04 K/UL — SIGNIFICANT CHANGE UP (ref 3.8–10.5)
WBC # FLD AUTO: 4.04 K/UL — SIGNIFICANT CHANGE UP (ref 3.8–10.5)

## 2023-02-03 PROCEDURE — 99233 SBSQ HOSP IP/OBS HIGH 50: CPT

## 2023-02-03 PROCEDURE — 99232 SBSQ HOSP IP/OBS MODERATE 35: CPT | Mod: GC

## 2023-02-03 RX ADMIN — Medication 100 MILLIGRAM(S): at 11:44

## 2023-02-03 RX ADMIN — CALCITRIOL 0.5 MICROGRAM(S): 0.5 CAPSULE ORAL at 11:44

## 2023-02-03 RX ADMIN — HYDROMORPHONE HYDROCHLORIDE 1 MILLIGRAM(S): 2 INJECTION INTRAMUSCULAR; INTRAVENOUS; SUBCUTANEOUS at 02:00

## 2023-02-03 RX ADMIN — Medication 1 MILLIGRAM(S): at 11:44

## 2023-02-03 RX ADMIN — Medication 0.6 MILLIGRAM(S): at 11:44

## 2023-02-03 RX ADMIN — HEPARIN SODIUM 5000 UNIT(S): 5000 INJECTION INTRAVENOUS; SUBCUTANEOUS at 16:10

## 2023-02-03 RX ADMIN — HEPARIN SODIUM 5000 UNIT(S): 5000 INJECTION INTRAVENOUS; SUBCUTANEOUS at 21:58

## 2023-02-03 RX ADMIN — Medication 2000 UNIT(S): at 11:44

## 2023-02-03 RX ADMIN — PANTOPRAZOLE SODIUM 40 MILLIGRAM(S): 20 TABLET, DELAYED RELEASE ORAL at 12:59

## 2023-02-03 RX ADMIN — CEFEPIME 100 MILLIGRAM(S): 1 INJECTION, POWDER, FOR SOLUTION INTRAMUSCULAR; INTRAVENOUS at 05:47

## 2023-02-03 RX ADMIN — CARVEDILOL PHOSPHATE 6.25 MILLIGRAM(S): 80 CAPSULE, EXTENDED RELEASE ORAL at 05:47

## 2023-02-03 RX ADMIN — HEPARIN SODIUM 5000 UNIT(S): 5000 INJECTION INTRAVENOUS; SUBCUTANEOUS at 05:47

## 2023-02-03 RX ADMIN — CILOSTAZOL 100 MILLIGRAM(S): 100 TABLET ORAL at 11:44

## 2023-02-03 NOTE — PROGRESS NOTE ADULT - SUBJECTIVE AND OBJECTIVE BOX
Subjective:     Pt seen and examined at bedside. Denies any fevers, chills, nausea.       MEDICATIONS  (STANDING):  allopurinol 100 milliGRAM(s) Oral daily  calcitriol   Capsule 0.5 MICROGram(s) Oral daily  carvedilol 6.25 milliGRAM(s) Oral every 12 hours  chlorhexidine 2% Cloths 1 Application(s) Topical daily  cholecalciferol 2000 Unit(s) Oral daily  cilostazol 100 milliGRAM(s) Oral two times a day  colchicine 0.6 milliGRAM(s) Oral daily  folic acid 1 milliGRAM(s) Oral daily  heparin   Injectable 5000 Unit(s) SubCutaneous every 8 hours  pantoprazole  Injectable 40 milliGRAM(s) IV Push daily    MEDICATIONS  (PRN):  acetaminophen     Tablet .. 650 milliGRAM(s) Oral every 6 hours PRN Temp greater or equal to 38C (100.4F), Mild Pain (1 - 3)  aluminum hydroxide/magnesium hydroxide/simethicone Suspension 30 milliLiter(s) Oral every 6 hours PRN Dyspepsia  HYDROmorphone  Injectable 1 milliGRAM(s) IV Push every 4 hours PRN Severe Pain (7 - 10)  HYDROmorphone  Injectable 0.5 milliGRAM(s) IV Push every 4 hours PRN Moderate Pain (4 - 6)  meclizine 25 milliGRAM(s) Oral every 12 hours PRN Dizziness  melatonin 3 milliGRAM(s) Oral at bedtime PRN Insomnia  polyethylene glycol 3350 17 Gram(s) Oral daily PRN Constipation  senna 2 Tablet(s) Oral at bedtime PRN Constipation      Allergies    No Known Allergies    Intolerances        DVT Prophylaxis: [ ] YES [ ] NO      Antibiotics: [ ] YES [ ] NO    Pain Scale (1-10):       Location:    Vital Signs Last 24 Hrs  T(C): 36.7 (2023 12:26), Max: 37.1 (2023 05:20)  T(F): 98.1 (2023 12:26), Max: 98.7 (2023 05:20)  HR: 72 (2023 12:26) (70 - 83)  BP: 120/63 (2023 12:26) (120/63 - 135/57)  BP(mean): --  RR: 19 (2023 12:26) (17 - 19)  SpO2: 99% (2023 12:26) (97% - 99%)    Parameters below as of 2023 12:26  Patient On (Oxygen Delivery Method): room air        Drug Dosing Weight  Height (cm): 170.2 (2023 12:45)  Weight (kg): 89.1 (2023 12:45)  BMI (kg/m2): 30.8 (2023 12:45)  BSA (m2): 2.01 (2023 12:45)      PHYSICAL EXAM:  GENERAL: NAD, well-developed  HEAD:  Atraumatic, Normocephalic  EYES: EOMI, PERRLA, conjunctiva and sclera clear  NECK: Supple, No JVD  CHEST/LUNG: Clear to auscultation bilaterally; No wheeze  HEART: Regular rate and rhythm; No murmurs, rubs, or gallops  ABDOMEN: Soft, Nontender, Nondistended; Bowel sounds present  EXTREMITIES:  right leg swollen and erythematous  PSYCH: AAOx3  NEUROLOGY: non-focal  SKIN: No rashes or lesions    URINARY CATHETER: [ ] YES [ ] NO     LABS:  CBC Full  -  ( 2023 05:26 )  WBC Count : 4.04 K/uL  RBC Count : 2.47 M/uL  Hemoglobin : 8.2 g/dL  Hematocrit : 26.3 %  Platelet Count - Automated : 56 K/uL  Mean Cell Volume : 106.5 fL  Mean Cell Hemoglobin : 33.2 pg  Mean Cell Hemoglobin Concentration : 31.2 gm/dL  Auto Neutrophil # : 2.91 K/uL  Auto Lymphocyte # : 0.58 K/uL  Auto Monocyte # : 0.24 K/uL  Auto Eosinophil # : 0.22 K/uL  Auto Basophil # : 0.06 K/uL  Auto Neutrophil % : 72.1 %  Auto Lymphocyte % : 14.4 %  Auto Monocyte % : 5.9 %  Auto Eosinophil % : 5.4 %  Auto Basophil % : 1.5 %    02    141  |  110<H>  |  72<H>  ----------------------------<  187<H>  4.2   |  18<L>  |  2.17<H>    Ca    8.7      2023 05:26  Phos  5.0     02-03  Mg     2.50     02-03    TPro  6.0  /  Alb  2.8<L>  /  TBili  2.3<H>  /  DBili  x   /  AST  106<H>  /  ALT  371<H>  /  AlkPhos  152<H>  -      Urinalysis Basic - ( 2023 17:00 )    Color: Yellow / Appearance: Slightly Turbid / S.018 / pH: x  Gluc: x / Ketone: Negative  / Bili: Negative / Urobili: 3 mg/dL   Blood: x / Protein: 30 mg/dL / Nitrite: Negative   Leuk Esterase: Negative / RBC: 23 /HPF / WBC 2 /HPF   Sq Epi: x / Non Sq Epi: 2 /HPF / Bacteria: Negative        CULTURES:    RADIOLOGY & ADDITIONAL STUDIES: Subjective:     Pt seen and examined at bedside. Denies any fevers, chills, nausea.       MEDICATIONS  (STANDING):  allopurinol 100 milliGRAM(s) Oral daily  calcitriol   Capsule 0.5 MICROGram(s) Oral daily  carvedilol 6.25 milliGRAM(s) Oral every 12 hours  chlorhexidine 2% Cloths 1 Application(s) Topical daily  cholecalciferol 2000 Unit(s) Oral daily  cilostazol 100 milliGRAM(s) Oral two times a day  colchicine 0.6 milliGRAM(s) Oral daily  folic acid 1 milliGRAM(s) Oral daily  heparin   Injectable 5000 Unit(s) SubCutaneous every 8 hours  pantoprazole  Injectable 40 milliGRAM(s) IV Push daily    MEDICATIONS  (PRN):  acetaminophen     Tablet .. 650 milliGRAM(s) Oral every 6 hours PRN Temp greater or equal to 38C (100.4F), Mild Pain (1 - 3)  aluminum hydroxide/magnesium hydroxide/simethicone Suspension 30 milliLiter(s) Oral every 6 hours PRN Dyspepsia  HYDROmorphone  Injectable 1 milliGRAM(s) IV Push every 4 hours PRN Severe Pain (7 - 10)  HYDROmorphone  Injectable 0.5 milliGRAM(s) IV Push every 4 hours PRN Moderate Pain (4 - 6)  meclizine 25 milliGRAM(s) Oral every 12 hours PRN Dizziness  melatonin 3 milliGRAM(s) Oral at bedtime PRN Insomnia  polyethylene glycol 3350 17 Gram(s) Oral daily PRN Constipation  senna 2 Tablet(s) Oral at bedtime PRN Constipation      Allergies    No Known Allergies    Intolerances        DVT Prophylaxis: [ ] YES [ ] NO      Antibiotics: [ ] YES [ ] NO    Pain Scale (1-10):       Location:    Vital Signs Last 24 Hrs  T(C): 36.7 (2023 12:26), Max: 37.1 (2023 05:20)  T(F): 98.1 (2023 12:26), Max: 98.7 (2023 05:20)  HR: 72 (2023 12:26) (70 - 83)  BP: 120/63 (2023 12:26) (120/63 - 135/57)  BP(mean): --  RR: 19 (2023 12:26) (17 - 19)  SpO2: 99% (2023 12:26) (97% - 99%)    Parameters below as of 2023 12:26  Patient On (Oxygen Delivery Method): room air        Drug Dosing Weight  Height (cm): 170.2 (2023 12:45)  Weight (kg): 89.1 (2023 12:45)  BMI (kg/m2): 30.8 (2023 12:45)  BSA (m2): 2.01 (2023 12:45)      PHYSICAL EXAM:  GENERAL: NAD, well-developed  HEAD:  Atraumatic, Normocephalic  EYES: EOMI, PERRLA, conjunctiva and sclera clear  NECK: Supple, No JVD  CHEST/LUNG: Clear to auscultation bilaterally; No wheeze  HEART: Regular rate and rhythm; No murmurs, rubs, or gallops  ABDOMEN: Soft, Nontender, Nondistended; Bowel sounds present  EXTREMITIES:  right leg swollen and erythematous  PSYCH: AAOx3  NEUROLOGY: non-focal  SKIN: No rashes or lesions    URINARY CATHETER: [ ] YES [ ] NO     LABS:  CBC Full  -  ( 2023 05:26 )  WBC Count : 4.04 K/uL  RBC Count : 2.47 M/uL  Hemoglobin : 8.2 g/dL  Hematocrit : 26.3 %  Platelet Count - Automated : 56 K/uL  Mean Cell Volume : 106.5 fL  Mean Cell Hemoglobin : 33.2 pg  Mean Cell Hemoglobin Concentration : 31.2 gm/dL  Auto Neutrophil # : 2.91 K/uL  Auto Lymphocyte # : 0.58 K/uL  Auto Monocyte # : 0.24 K/uL  Auto Eosinophil # : 0.22 K/uL  Auto Basophil # : 0.06 K/uL  Auto Neutrophil % : 72.1 %  Auto Lymphocyte % : 14.4 %  Auto Monocyte % : 5.9 %  Auto Eosinophil % : 5.4 %  Auto Basophil % : 1.5 %    02    141  |  110<H>  |  72<H>  ----------------------------<  187<H>  4.2   |  18<L>  |  2.17<H>    Ca    8.7      2023 05:26  Phos  5.0     02-03  Mg     2.50     02-03    TPro  6.0  /  Alb  2.8<L>  /  TBili  2.3<H>  /  DBili  x   /  AST  106<H>  /  ALT  371<H>  /  AlkPhos  152<H>  02-03    LDH - 201  Hapto - 327    Urinalysis Basic - ( 2023 17:00 )    Color: Yellow / Appearance: Slightly Turbid / S.018 / pH: x  Gluc: x / Ketone: Negative  / Bili: Negative / Urobili: 3 mg/dL   Blood: x / Protein: 30 mg/dL / Nitrite: Negative   Leuk Esterase: Negative / RBC: 23 /HPF / WBC 2 /HPF   Sq Epi: x / Non Sq Epi: 2 /HPF / Bacteria: Negative

## 2023-02-03 NOTE — PROGRESS NOTE ADULT - PROBLEM SELECTOR PLAN 11
- Venodyne boots  - aspirin and Plavix on hold due to suspected GIB    Plan discussed with ACP    Encourage OOB with assist.

## 2023-02-03 NOTE — PROGRESS NOTE ADULT - SUBJECTIVE AND OBJECTIVE BOX
Patient is a 82y old  Male who presents with a chief complaint of Anemia (2023 20:39)       INTERVAL HPI/OVERNIGHT EVENTS:  Patient seen and evaluated at bedside.  Pt is resting comfortable in NAD. Denies N/V/F/C.    Allergies    No Known Allergies    Intolerances        Vital Signs Last 24 Hrs  T(C): 37.1 (2023 05:20), Max: 37.2 (2023 15:47)  T(F): 98.7 (2023 05:20), Max: 99 (2023 15:47)  HR: 70 (2023 05:20) (70 - 119)  BP: 135/50 (2023 05:20) (110/54 - 135/57)  BP(mean): --  RR: 19 (2023 05:20) (16 - 19)  SpO2: 97% (2023 05:20) (95% - 98%)    Parameters below as of 2023 05:20  Patient On (Oxygen Delivery Method): room air        LABS:                        8.2    4.04  )-----------( 56       ( 2023 05:26 )             26.3     02-03    141  |  110<H>  |  72<H>  ----------------------------<  187<H>  4.2   |  18<L>  |  2.17<H>    Ca    8.7      2023 05:26  Phos  5.0     02-  Mg     2.50     -    TPro  6.0  /  Alb  2.8<L>  /  TBili  2.3<H>  /  DBili  x   /  AST  106<H>  /  ALT  371<H>  /  AlkPhos  152<H>  02-03      Urinalysis Basic - ( 2023 17:00 )    Color: Yellow / Appearance: Slightly Turbid / S.018 / pH: x  Gluc: x / Ketone: Negative  / Bili: Negative / Urobili: 3 mg/dL   Blood: x / Protein: 30 mg/dL / Nitrite: Negative   Leuk Esterase: Negative / RBC: 23 /HPF / WBC 2 /HPF   Sq Epi: x / Non Sq Epi: 2 /HPF / Bacteria: Negative      CAPILLARY BLOOD GLUCOSE      POCT Blood Glucose.: 193 mg/dL (2023 08:57)      Lower Extremity Physical Exam:    Vascular: DP/PT 2/4, B/L, CFT <3 seconds B/L, Temperature gradient warm to cool B/L.   Neuro: Epicritic sensation to level of digits   Musculoskeletal/Ortho: 1st MPJ HAV, lesser digit contractures B/L  Skin: Right foot 1st MPJ and DIPJ localized erythema, no open wounds. Left foot with no signs of infection       RADIOLOGY & ADDITIONAL TESTS:

## 2023-02-03 NOTE — PROGRESS NOTE ADULT - PROBLEM SELECTOR PLAN 1
Appreciate podiatry consult - s/p I+D of the tophi  - likely acute gout with delayed clinical improvement  - low clinical suspicion for infection.   - recent Foot xray did not yield obvious infectious etiology.  - Continue Colchicine  - pain control

## 2023-02-03 NOTE — PROGRESS NOTE ADULT - ASSESSMENT
IMPRESSION: 82M w/ HTN, gout, prostate CA, MDS, USHA, and CKD4, 1/27/23 p/w anemia/new AFib    (1)Renal - CKD4 - USHA; resolved prerenally mediated JUAN RAMON from admission   (2)Anemia - Heme+GI on board - off Revlimid for now  (3)Metabolic acidosis - renally mediated - mild/okay for now  (4)EP - Cards on board - unclear if patient truly had AFib - not in AFib at present  (5)Foot pain - is this gout? High fever overnight - septic arthritis?   IMPRESSION: 82M w/ HTN, gout, prostate CA, MDS, USAH, and CKD4, 1/27/23 p/w anemia/new AFib    (1)Renal - CKD4 - USHA; resolved prerenally mediated JUAN RAMON from admission   (2)Anemia - Heme+GI on board - off Revlimid for now  (3)Metabolic acidosis - renally mediated - mild/okay for now  (4)EP - Cards on board - unclear if patient truly had AFib - not in AFib at present  (5)Foot pain - is this gout? septic arthritis? Afebrile;  s/p IV Cefepime and Vanco    RECOMMEND:  (1)ID f/u regarding ?foot infection  (2)Colchicine as ordered  (3)Dose new meds for GFR 20-30ml/min    MARIA DEL CARMEN CarrilloC  [facility]  [phone]    IMPRESSION: 82M w/ HTN, gout, prostate CA, MDS, USHA, and CKD4, 1/27/23 p/w anemia/new AFib    (1)Renal - CKD4 - USHA; resolved prerenally mediated JUAN RAMON from admission   (2)Anemia - Heme+GI on board - off Revlimid for now  (3)Metabolic acidosis - renally mediated - mild/okay for now  (4)EP - Cards on board - unclear if patient truly had AFib - not in AFib at present  (5)Foot pain - is this gout? septic arthritis? Afebrile;  s/p IV Cefepime and Vanco    RECOMMEND:  (1)ID f/u regarding ?foot infection  (2)Colchicine as ordered  (3)Dose new meds for GFR 20-30ml/min    SACHIN Carrillo  [facility]  [phone]     RENAL ATTENDING NOTE  Patient seen and examined with NP. Agree with assessment and plan as above.  Medicine and Podiatry input noted - s/p biopsy of R foot tophus. Starting to improve clinically, on Colchicine.    Edis Cabral MD  [facility]  [phone]

## 2023-02-03 NOTE — DIETITIAN INITIAL EVALUATION ADULT - NSICDXPASTMEDICALHX_GEN_ALL_CORE_FT
PAST MEDICAL HISTORY:  Aneurysm, ascending aorta     Gout     HTN - Hypertension     Hypercholesterolemia     PC (prostate cancer) s/p surgical resection 3 years ago

## 2023-02-03 NOTE — DIETITIAN INITIAL EVALUATION ADULT - PERTINENT LABORATORY DATA
02-03 Na 141 mmol/L Glu 187 mg/dL<H> K+ 4.2 mmol/L Cr 2.17 mg/dL<H> BUN 72 mg/dL<H> Phos 5.0 mg/dL<H>  02-03 @ 08:57 POCT 193 mg/dL    POCT Blood Glucose.: 193 mg/dL (02-03-23 @ 08:57)

## 2023-02-03 NOTE — DIETITIAN INITIAL EVALUATION ADULT - PERTINENT MEDS FT
MEDICATIONS  (STANDING):  allopurinol 100 milliGRAM(s) Oral daily  calcitriol   Capsule 0.5 MICROGram(s) Oral daily  carvedilol 6.25 milliGRAM(s) Oral every 12 hours  chlorhexidine 2% Cloths 1 Application(s) Topical daily  cholecalciferol 2000 Unit(s) Oral daily  cilostazol 100 milliGRAM(s) Oral two times a day  colchicine 0.6 milliGRAM(s) Oral daily  folic acid 1 milliGRAM(s) Oral daily  heparin   Injectable 5000 Unit(s) SubCutaneous every 8 hours  pantoprazole  Injectable 40 milliGRAM(s) IV Push daily    MEDICATIONS  (PRN):  acetaminophen     Tablet .. 650 milliGRAM(s) Oral every 6 hours PRN Temp greater or equal to 38C (100.4F), Mild Pain (1 - 3)  aluminum hydroxide/magnesium hydroxide/simethicone Suspension 30 milliLiter(s) Oral every 6 hours PRN Dyspepsia  HYDROmorphone  Injectable 1 milliGRAM(s) IV Push every 4 hours PRN Severe Pain (7 - 10)  HYDROmorphone  Injectable 0.5 milliGRAM(s) IV Push every 4 hours PRN Moderate Pain (4 - 6)  meclizine 25 milliGRAM(s) Oral every 12 hours PRN Dizziness  melatonin 3 milliGRAM(s) Oral at bedtime PRN Insomnia  polyethylene glycol 3350 17 Gram(s) Oral daily PRN Constipation  senna 2 Tablet(s) Oral at bedtime PRN Constipation

## 2023-02-03 NOTE — DIETITIAN INITIAL EVALUATION ADULT - OTHER INFO
Per chart, 81 y/o male with MDS, chronic diastolic CHF, HTN, Prostate CA s/p Prostectomy, Carotid Art stenosis s/p CEA, PAD, Renal Art Stenosis s/p stent, CKD, p/w Acute on chronic anemia from MDS c/b New Afib, acute Gout flare, JUAN RAMON on CKD.      Pt asleep during time of interview unable to arouse. No recent episodes of nausea, vomiting, diarrhea or constipation per EMR, BM noted on 1/30 per RN flowsheets. No reports of any chewing/swallowing difficulties. No food allergies per EMR. No intake reported per RN flowsheets, continue to monitor and document PO intake in flowsheets. Feeding skills: set up help required. RD to remain available for nutrition interventions as warranted.  Per chart, 83 y/o male with MDS, chronic diastolic CHF, HTN, Prostate CA s/p Prostectomy, Carotid Art stenosis s/p CEA, PAD, Renal Art Stenosis s/p stent, CKD, p/w Acute on chronic anemia from MDS c/b New Afib, acute Gout flare, JUAN RAMON on CKD.    Pt asleep during time of interview unable to arouse. No recent episodes of nausea, vomiting, diarrhea or constipation per EMR, BM noted on 1/30 per RN flowsheets. No reports of any chewing/swallowing difficulties. No food allergies per EMR. Per HIE Pt previous weights fluctuate between 83-89kg since May 2022. No significant loss or gain x9 months. No intake reported per RN flowsheets, continue to monitor and document PO intake in flowsheets. Feeding skills: set up help required. RD to remain available for nutrition interventions as warranted.

## 2023-02-03 NOTE — PROGRESS NOTE ADULT - SUBJECTIVE AND OBJECTIVE BOX
[facility] Division of Hospital Medicine  Jonathon Aldrich MD  Pager ([facility], 8A-5P): [phone]  Other Times:  [phone]    Patient is a 82y old  Male who presents with a chief complaint of Anemia (2023 09:19)    SUBJECTIVE / OVERNIGHT EVENTS:  States that his foot pain feels better after podiatry procedure yesterday.  No new complaints. No F/C, N/V, CP, SOB, Cough, lightheadedness, dizziness, abdominal pain, diarrhea, dysuria.    MEDICATIONS  (STANDING):  allopurinol 100 milliGRAM(s) Oral daily  calcitriol   Capsule 0.5 MICROGram(s) Oral daily  carvedilol 6.25 milliGRAM(s) Oral every 12 hours  chlorhexidine 2% Cloths 1 Application(s) Topical daily  cholecalciferol 2000 Unit(s) Oral daily  cilostazol 100 milliGRAM(s) Oral two times a day  colchicine 0.6 milliGRAM(s) Oral daily  folic acid 1 milliGRAM(s) Oral daily  heparin   Injectable 5000 Unit(s) SubCutaneous every 8 hours  pantoprazole  Injectable 40 milliGRAM(s) IV Push daily    MEDICATIONS  (PRN):  acetaminophen     Tablet .. 650 milliGRAM(s) Oral every 6 hours PRN Temp greater or equal to 38C (100.4F), Mild Pain (1 - 3)  aluminum hydroxide/magnesium hydroxide/simethicone Suspension 30 milliLiter(s) Oral every 6 hours PRN Dyspepsia  HYDROmorphone  Injectable 1 milliGRAM(s) IV Push every 4 hours PRN Severe Pain (7 - 10)  HYDROmorphone  Injectable 0.5 milliGRAM(s) IV Push every 4 hours PRN Moderate Pain (4 - 6)  meclizine 25 milliGRAM(s) Oral every 12 hours PRN Dizziness  melatonin 3 milliGRAM(s) Oral at bedtime PRN Insomnia  polyethylene glycol 3350 17 Gram(s) Oral daily PRN Constipation  senna 2 Tablet(s) Oral at bedtime PRN Constipation      Vital Signs Last 24 Hrs  T(C): 36.7 (2023 12:26), Max: 37.2 (2023 15:47)  T(F): 98.1 (2023 12:26), Max: 99 (2023 15:47)  HR: 72 (2023 12:26) (70 - 83)  BP: 120/63 (2023 12:26) (120/63 - 135/57)  BP(mean): --  RR: 19 (2023 12:26) (17 - 19)  SpO2: 99% (2023 12:26) (96% - 99%)    Parameters below as of 2023 12:26  Patient On (Oxygen Delivery Method): room air      CAPILLARY BLOOD GLUCOSE      POCT Blood Glucose.: 193 mg/dL (2023 08:57)    I&O's Summary      PHYSICAL EXAM:  CONSTITUTIONAL: NAD  EYES: PERRLA; conjunctiva and sclera clear  ENMT: Moist oral mucosa, no pharyngeal injection or exudates; normal dentition  NECK: Supple, no palpable masses; no thyromegaly  RESPIRATORY: Normal respiratory effort; lungs are clear to auscultation bilaterally  CARDIOVASCULAR: Regular rate and rhythm, normal S1 and S2, no murmur/rub/gallop; No lower extremity edema; Peripheral pulses are 2+ bilaterally; LUE edema and erythema at the IV access site.  ABDOMEN: Nontender to palpation, normoactive bowel sounds, no rebound/guarding; No hepatosplenomegaly  MUSCULOSKELETAL:  Unable to assess gait; no clubbing or cyanosis of digits; no joint swelling or tenderness to palpation; limited ROM of RLE due to pain; Rt foot erythematous, edema, tender to palpation but improving.  PSYCH: A+O to person, place, and time; affect appropriate  NEUROLOGY: CN 2-12 are intact and symmetric; no gross sensory deficits   SKIN: No rashes; no palpable lesions    LABS:                        8.2    4.04  )-----------( 56       ( 2023 05:26 )             26.3     02-03    141  |  110<H>  |  72<H>  ----------------------------<  187<H>  4.2   |  18<L>  |  2.17<H>    Ca    8.7      2023 05:26  Phos  5.0     02-03  Mg     2.50     02-03    TPro  6.0  /  Alb  2.8<L>  /  TBili  2.3<H>  /  DBili  x   /  AST  106<H>  /  ALT  371<H>  /  AlkPhos  152<H>            Urinalysis Basic - ( 2023 17:00 )    Color: Yellow / Appearance: Slightly Turbid / S.018 / pH: x  Gluc: x / Ketone: Negative  / Bili: Negative / Urobili: 3 mg/dL   Blood: x / Protein: 30 mg/dL / Nitrite: Negative   Leuk Esterase: Negative / RBC: 23 /HPF / WBC 2 /HPF   Sq Epi: x / Non Sq Epi: 2 /HPF / Bacteria: Negative        RADIOLOGY & ADDITIONAL TESTS:    Imaging Personally Reviewed:    Care Discussed with Consultants/Other Providers:

## 2023-02-03 NOTE — PROGRESS NOTE ADULT - PROBLEM SELECTOR PLAN 4
- ECG = Sinus rhythm w/ occasional PVCs and PACs at 99 bpm, QTc = 503  - [id number] score = 5  - A-fib likely of multifactorial etiology; infection, dehydration, anemia.  Unlikely CHF (pro-BNP unlikely of cardiac origin)  - receiving antibiotic, being hydrated and being transfused PRBCs  - has ~ II/VI systolic murmur (pronounced over aortic and mitral valve areas)  - being followed by Cardiology team (appreciated)  - continuing with reduced dose of carvedilol (from 25 mg to 6.25 mg), (reduced due to borderline low blood pressure)  - continuing w/ statin  - aspirin and Plavix on hold presently (pls d/w Cardiology team re restarting same)  - f/u TTE (ordered)

## 2023-02-03 NOTE — PROGRESS NOTE ADULT - PROBLEM SELECTOR PLAN 2
"Chief complaint:   Chief Complaint   Patient presents with   â¢ Follow-up     no concerns, needs refills all meds       Vitals:  Visit Vitals  /74 (BP Location: RUE - Right upper extremity, Patient Position: Sitting, Cuff Size: Large Adult)   Pulse 65   Temp 98.4 Â°F (36.9 Â°C) (Temporal)   Resp 16   Ht 5' 2.5"" (1.588 m)   Wt 59 kg   SpO2 92%   BMI 23.40 kg/mÂ²       HISTORY OF PRESENT ILLNESS     HPI  This is an 79-year-old lady with history of HTN, DM 2, CKD Stage 3, diabetic neuropathy, COPD, osteoporosis, history of lung cancer, S/P lobectomy, history of breast cancer, S/P left mastectomy, presented for follow-up. Patient has been doing well, she has not had any swelling of the legs, she cut back on the furosemide to 2 tablets once a day. She has started metformin but only to 100 mg b.i.d.. Her blood sugars has been running in 150s 160s, she forgot to bring her log book but her average has been 150s. She gets some help for bathing but otherwise she is independent in her activities of daily living. She is no longer using any oxygen, and denies any shortness of breath recently. She is accompanied by her daughter, who support to her with her needs.   Other significant problems:  Patient Active Problem List    Diagnosis Date Noted   â¢ Dermatophytosis of nail 11/29/2012     Priority: Medium   â¢ Essential hypertension      Priority: Medium   â¢ Creatinine elevation 08/25/2020     Priority: Low   â¢ Acute exacerbation of CHF (congestive heart failure) (CMS/[facility]) 07/01/2020     Priority: Low   â¢ Transient neurological symptoms 12/16/2019     Priority: Low   â¢ Facial droop 12/16/2019     Priority: Low   â¢ Ulnar neuropathy of both upper extremities 08/07/2019     Priority: Low   â¢ Depression 05/10/2019     Priority: Low   â¢ Influenza B 05/01/2018     Priority: Low   â¢ Hyperkalemia 04/13/2018     Priority: Low   â¢ Renal insufficiency 04/13/2018     Priority: Low   â¢ COPD exacerbation (CMS/[facility]) 04/13/2018     Priority: " Low   â¢ Diabetes mellitus without complication (CMS/HCC) 58/99/6312     Priority: Low   â¢ Onychomycosis 08/20/2015     Priority: Low   â¢ Bilateral pneumonia 06/25/2015     Priority: Low   â¢ Leukocytosis 06/25/2015     Priority: Low   â¢ Pain in both feet 06/11/2015     Priority: Low   â¢ Leg swelling 02/27/2015     Priority: Low   â¢ Microalbuminuria 08/19/2014     Priority: Low   â¢ History of tobacco use 07/14/2014     Priority: Low   â¢ Pleural effusion 07/14/2014     Priority: Low   â¢ Adenocarcinoma of lung (CMS/HCC) 03/04/2013     Priority: Low   â¢ Polymyalgia rheumatica (CMS/HCC) 01/23/2013     Priority: Low   â¢ Breast cancer (CMS/HCC) 01/23/2013     Priority: Low   â¢ Atrophic vaginitis 01/23/2013     Priority: Low   â¢ COPD, severe (CMS/HCC)      Priority: Low   â¢ Osteoporosis      Priority: Low   â¢ Hyperlipidemia      Priority: Low   â¢ Acquired hypothyroidism      Priority: Low   â¢ Coarse tremors      Priority: Low       PAST MEDICAL, FAMILY AND SOCIAL HISTORY     Medications:  Current Outpatient Medications   Medication   â¢ carvedilol (COREG) 25 MG tablet   â¢ furosemide (LASIX) 20 MG tablet   â¢ gabapentin (NEURONTIN) 100 MG capsule   â¢ insulin glargine (Lantus SoloStar) 100 UNIT/ML pen-injector   â¢ levothyroxine 125 MCG tablet   â¢ losartan (COZAAR) 25 MG tablet   â¢ mirtazapine (REMERON) 15 MG tablet   â¢ rosuvastatin (CRESTOR) 10 MG tablet   â¢ DISPENSE   â¢ oxygen (O2) gas   â¢ vitamin B-12 (CYANOCOBALAMIN) 1000 MCG tablet   â¢ Insulin Pen Needle (PEN NEEDLES) 32G X 6 MM Misc   â¢ Glucose Blood (BLOOD GLUCOSE TEST STRIPS) Strip   â¢ blood glucose test strip   â¢ ipratropium-albuterol (DUONEB) 0.5-2.5 (3) MG/3ML nebulizer solution   â¢ albuterol 108 (90 Base) MCG/ACT inhaler   â¢ fluticasone-salmeterol (ADVAIR DISKUS) 500-50 MCG/DOSE inhaler   â¢ HYDROcodone-acetaminophen (NORCO) 5-325 MG per tablet   â¢ Blood Glucose Monitoring Suppl (BLOOD GLUCOSE MONITOR SYSTEM) w/Device Kit   â¢ Acetaminophen (TYLENOL ARTHRITIS PAIN PO) â¢ Multiple Vitamins-Minerals (HAIR SKIN AND NAILS FORMULA PO)   â¢ fluticasone (FLONASE) 50 MCG/ACT nasal spray   â¢ Blood Glucose Monitoring Suppl ( BLOOD GLUCOSE MONITOR) Device   â¢ fluocinolone (SYNALAR) 0.025 % ointment   â¢ Cholecalciferol (VITAMIN D) 2000 UNITS capsule   â¢ Multiple Vitamins-Minerals (MULTIVITAMIN PO)   â¢ aspirin 81 MG tablet   â¢ metFORMIN (GLUCOPHAGE) 500 MG tablet     No current facility-administered medications for this visit. Allergies:  ALLERGIES:   Allergen Reactions   â¢ Avandia [Rosiglitazone Maleate] SHORTNESS OF BREATH   â¢ Tarceva [Erlotinib] SHORTNESS OF BREATH     Sores all over body   â¢ Ace Inhibitors Cough   â¢ Ativan RASH and Other (See Comments)     Altered mental status   â¢ Lorazepam RASH     Swollen eyes and rash all over face   â¢ Phenobarbital RASH   â¢ Vicodin [Hydrocodone-Acetaminophen] Other (See Comments)     Extreme confusion       Past Medical  History/Surgeries:  Past Medical History:   Diagnosis Date   â¢ Age-related macular degeneration    â¢ Anemia    â¢ Anxiety    â¢ Arthritis    â¢ Bilateral cataracts    â¢ Breast Cancer     Left   â¢ Bronchitis    â¢ Callus    â¢ Chronic respiratory failure with hypoxia (CMS/HCC)     1 L O2 at rest, 4 L O2 w/ exertion (Oxygen One). Followed by Dr. Sherry Clement [phone]   â¢ Claustrophobia    â¢ Congestive cardiac failure (CMS/HCC)    â¢ COPD, severe (CMS/HCC)     Followed by Dr. Sherry Clement [phone]    â¢ Diabetes mellitus, type 2 (CMS/HCC)    â¢ Difficulty in walking(189.7)    â¢ Environmental allergies    â¢ H/O: lung cancer 11/06/2009    KATERIN, nonsmall cell.  Followed by Dr. Sherry Clement [phone]    â¢ Hyperlipidemia    â¢ Hypertension    â¢ Hypothyroidism    â¢ Liver mass    â¢ Onychomycosis    â¢ Osteoporosis    â¢ Peripheral edema    â¢ Pleural effusion     Bilateral; Followed by Dr. Sherry Clement [phone]   â¢ Pneumonia    â¢ Polymyalgia rheumatica (CMS/HCC)    â¢ PONV (postoperative nausea and vomiting)    â¢ Postmastectomy lymphedema syndrome    â¢ Pseudophakia    â¢ Pulmonary nodule 2007    [facility], Followed by Dr. Obie Miranda [phone]   â¢ Rash    â¢ Sinusitis, chronic    â¢ TREMORS ESSENTIAL    â¢ Vegetarian diet        Past Surgical History:   Procedure Laterality Date   â¢ Appendectomy     â¢ Breast surgery Left    â¢ Cyber knife lung Left 2009   â¢ Eye surgery      Cataracts, bilaterally   â¢ Mastec,mod radical     â¢ Removal of tonsils,<14 y/o     â¢ Temporal artery ligatn or bx  10/14/2011   â¢ Thoracentesis  2011   â¢ Tonsillectomy and adenoidectomy     â¢ Us guided breast core biopsy  2009    Lung       Family History:  Family History   Problem Relation Age of Onset   â¢ Respiratory Mother         chronic bronchitis   â¢ High blood pressure Father    â¢ Kidney disease Father    â¢ Endocrine Disorder Daughter    â¢ Diabetes Daughter    â¢ Heart disease Daughter    â¢ Endocrine Disorder Daughter    â¢ Endocrine Disorder Daughter        Social History:  Social History     Tobacco Use   â¢ Smoking status: Former Smoker     Packs/day: 2.00     Years: 44.00     Pack years: 88.00     Types: Cigarettes     Start date: 5/10/1949     Quit date: 1994     Years since quittin.9   â¢ Smokeless tobacco: Never Used   â¢ Tobacco comment: Lives in a senior apartment, daughter checks up on her   Substance Use Topics   â¢ Alcohol use: No     Alcohol/week: 0.0 standard drinks     Frequency: Never     Binge frequency: Never       REVIEW OF SYSTEMS     Review of Systems  Constitutional:  Denies fever, chills. Eyes:  She does have slight blurriness with the vision, denies red eye. HENT:  Denies nasal congestion or sore throat. Respiratory:  Denies cough or shortness of breath. Cardiovascular:  Denies chest pain or edema. Gastrointestinal:  Denies abdominal pain, N/V, constipation or diarrhea, melena. Genitourinary:  Denies dysuria, frequency of urination, hematuria.      PHYSICAL EXAM     Physical Exam  General: Well developed, well nourished. In no apparent distress. Eyes:  PERRL, EOMI. Conjunctivae pink. Sclerae anicteric. HENT:  Normocephalic. Bilateral external ears are normal.  Mucosal membranes moist.  External nose is normal.  Oropharynx is clear. Neck:  Supple. No masses. No thyromegaly. Trachea midline. Respiratory:  Normal respiratory effort. Lungs clear to auscultation bilaterally. Symmetrical chest expansion. No dullness on percussion. Cardiovascular:  Regular rate and rhythm. Normal S1 and S2. No JVD. No carotid bruits. No peripheral edema. Gastrointestinal:  Soft. Nontender. Normal bowel sounds. No  abdominal masses. No hepatosplenomegaly. Genitourinary:  No costovertebral angle tenderness. Musculoskeletal:  No clubbing or cyanosis. Integumentary:  Warm. Dry. Pink. No rashes or lesions. Lymphatic:  No lymphadenopathy in submental, submandibular or cervical chain. No supraclavicular or infraclavicular lymphadenopathy. Psychiatric:  Cooperative. Appropriate mood and affect. Normal judgment. ASSESSMENT/PLAN     1. Essential hypertension  Well-controlled, continue losartan 25 mg daily with carvedilol 25 mg b.i.d.  - carvedilol (COREG) 25 MG tablet; Take 1 tablet by mouth 2 times daily (with meals). Dispense: 180 tablet; Refill: 3    2. Type 2 diabetes mellitus with diabetic autonomic neuropathy, with long-term current use of insulin (CMS/[facility])  Following labs ordered, she takes Lantus 10 units daily, metformin 500 mg b.i.d.  - CBC WITH DIFFERENTIAL; Future  - COMPREHENSIVE METABOLIC PANEL; Future  - LIPID PANEL WITH REFLEX; Future  - GLYCOHEMOGLOBIN; Future  - MICROALBUMIN URINE RANDOM; Future  - insulin glargine (Lantus SoloStar) 100 UNIT/ML pen-injector; Inject 10 Units into the skin every morning. Prime 2 units before each dose. Dispense: 15 mL; Refill: 12    3.  Chronic diastolic CHF (congestive heart failure) (CMS/HCC)  Well-compensated, she will continue furosemide 40 mg daily. Medication refilled. 4. COPD, severe (CMS/HCC)  Stable, following with pulmonologist, she was weaned off of the oxygen, currently feeling good. 5. Hypothyroidism, unspecified type  Currently on levothyroxine 125 mcg daily, TSH will be repeated. - THYROID STIMULATING HORMONE; Future    6. Iron deficiency anemia, unspecified iron deficiency anemia type  Repeat CBC. 7. Polymyalgia rheumatica (CMS/HCC)  Asymptomatic. She is not currently taking any medication. 8. Need for influenza vaccination  - INFLUENZA QUADRIVALENT HIGH DOSE PRES FREE 0.7 ML VACC,IM    Follow-up in 5 months.     Perez Yoder MD - macrocytic.  Hgb = 6.3, MCV = 110.9, in the context of MDS  - s/p 3 units of p-RBCs  - anemia is likely of multifactorial etiology; chronic disease, blood loss, dietary deficiency  - ferritin elevated  - GI consulted - on regular diet, cont ppi  - monitor H&H and tranfuse prn if hb<7.5, rpt hb if less <7.5 transfuse prbc  - folic acid started  - Hem/Onc consulted, per heme hold revlimid  - IV infiltration of the PRBC transfusion on 1/30 - will elevate and monitor clinical status.  - another 1u PRBC transfusion on 2/2 with good response

## 2023-02-03 NOTE — DIETITIAN INITIAL EVALUATION ADULT - NS FNS DIET ORDER
Diet, Regular:   DASH/TLC {Sodium & Cholesterol Restricted} (DASH) (01-29-23 @ 16:07) [Active]

## 2023-02-03 NOTE — PROGRESS NOTE ADULT - ASSESSMENT
82 y.o M w/ Right foot pain   - Pt was seen and evaluated.   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   - s/p bedside Incision and Drainage to R foot Hallux IPJ: today 3cc tophaceous / serosanguineous drainage mix expressed, no pus, no malodor, Right foot dorsal PIPJ erythema, right foot 1st MPJ erythema.   - Right foot wound cultured, pending   - Right foot Tophi sent to pathology, pending   - Recommend rheum consult   - Pod Plan: Local wound care pending Rheum Recs   - Seen with attending.   82 y.o M w/ Right foot pain   - Pt was seen and evaluated.   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   - s/p bedside Incision and Drainage to R foot Hallux IPJ: today 3cc tophaceous / serosanguineous drainage mix expressed, no pus, no malodor, Right foot dorsal PIPJ erythema, right foot 1st MPJ erythema.   - Right foot wound cultured, pending   - Right foot Tophi sent to pathology, pending   - Pod Plan: Local wound care  - Seen with attending.

## 2023-02-04 LAB
ALBUMIN SERPL ELPH-MCNC: 2.7 G/DL — LOW (ref 3.3–5)
ALP SERPL-CCNC: 148 U/L — HIGH (ref 40–120)
ALT FLD-CCNC: 279 U/L — HIGH (ref 4–41)
ANION GAP SERPL CALC-SCNC: 15 MMOL/L — HIGH (ref 7–14)
ANISOCYTOSIS BLD QL: SLIGHT — SIGNIFICANT CHANGE UP
AST SERPL-CCNC: 102 U/L — HIGH (ref 4–40)
BASOPHILS # BLD AUTO: 0.09 K/UL — SIGNIFICANT CHANGE UP (ref 0–0.2)
BASOPHILS NFR BLD AUTO: 3.5 % — HIGH (ref 0–2)
BILIRUB SERPL-MCNC: 1.7 MG/DL — HIGH (ref 0.2–1.2)
BUN SERPL-MCNC: 69 MG/DL — HIGH (ref 7–23)
BURR CELLS BLD QL SMEAR: PRESENT — SIGNIFICANT CHANGE UP
CALCIUM SERPL-MCNC: 8.4 MG/DL — SIGNIFICANT CHANGE UP (ref 8.4–10.5)
CHLORIDE SERPL-SCNC: 107 MMOL/L — SIGNIFICANT CHANGE UP (ref 98–107)
CO2 SERPL-SCNC: 17 MMOL/L — LOW (ref 22–31)
CREAT SERPL-MCNC: 1.92 MG/DL — HIGH (ref 0.5–1.3)
EGFR: 35 ML/MIN/1.73M2 — LOW
ELLIPTOCYTES BLD QL SMEAR: SIGNIFICANT CHANGE UP
EOSINOPHIL # BLD AUTO: 0.19 K/UL — SIGNIFICANT CHANGE UP (ref 0–0.5)
EOSINOPHIL NFR BLD AUTO: 7.8 % — HIGH (ref 0–6)
GIANT PLATELETS BLD QL SMEAR: PRESENT — SIGNIFICANT CHANGE UP
GLUCOSE SERPL-MCNC: 216 MG/DL — HIGH (ref 70–99)
HCT VFR BLD CALC: 25.3 % — LOW (ref 39–50)
HGB BLD-MCNC: 7.9 G/DL — LOW (ref 13–17)
IANC: 1.51 K/UL — LOW (ref 1.8–7.4)
LYMPHOCYTES # BLD AUTO: 0.21 K/UL — LOW (ref 1–3.3)
LYMPHOCYTES # BLD AUTO: 8.6 % — LOW (ref 13–44)
MACROCYTES BLD QL: SIGNIFICANT CHANGE UP
MAGNESIUM SERPL-MCNC: 2.3 MG/DL — SIGNIFICANT CHANGE UP (ref 1.6–2.6)
MCHC RBC-ENTMCNC: 31.2 GM/DL — LOW (ref 32–36)
MCHC RBC-ENTMCNC: 32.8 PG — SIGNIFICANT CHANGE UP (ref 27–34)
MCV RBC AUTO: 105 FL — HIGH (ref 80–100)
MONOCYTES # BLD AUTO: 0.08 K/UL — SIGNIFICANT CHANGE UP (ref 0–0.9)
MONOCYTES NFR BLD AUTO: 3.4 % — SIGNIFICANT CHANGE UP (ref 2–14)
NEUTROPHILS # BLD AUTO: 1.87 K/UL — SIGNIFICANT CHANGE UP (ref 1.8–7.4)
NEUTROPHILS NFR BLD AUTO: 72.4 % — SIGNIFICANT CHANGE UP (ref 43–77)
NEUTS BAND # BLD: 3.4 % — SIGNIFICANT CHANGE UP (ref 0–6)
OVALOCYTES BLD QL SMEAR: SIGNIFICANT CHANGE UP
PHOSPHATE SERPL-MCNC: 4 MG/DL — SIGNIFICANT CHANGE UP (ref 2.5–4.5)
PLAT MORPH BLD: NORMAL — SIGNIFICANT CHANGE UP
PLATELET # BLD AUTO: 72 K/UL — LOW (ref 150–400)
PLATELET COUNT - ESTIMATE: ABNORMAL
POIKILOCYTOSIS BLD QL AUTO: SIGNIFICANT CHANGE UP
POLYCHROMASIA BLD QL SMEAR: SLIGHT — SIGNIFICANT CHANGE UP
POTASSIUM SERPL-MCNC: 4.6 MMOL/L — SIGNIFICANT CHANGE UP (ref 3.5–5.3)
POTASSIUM SERPL-SCNC: 4.6 MMOL/L — SIGNIFICANT CHANGE UP (ref 3.5–5.3)
PROT SERPL-MCNC: 5.9 G/DL — LOW (ref 6–8.3)
RBC # BLD: 2.41 M/UL — LOW (ref 4.2–5.8)
RBC # FLD: 23.3 % — HIGH (ref 10.3–14.5)
RBC BLD AUTO: ABNORMAL
SODIUM SERPL-SCNC: 139 MMOL/L — SIGNIFICANT CHANGE UP (ref 135–145)
VARIANT LYMPHS # BLD: 0.9 % — SIGNIFICANT CHANGE UP (ref 0–6)
WBC # BLD: 2.47 K/UL — LOW (ref 3.8–10.5)
WBC # FLD AUTO: 2.47 K/UL — LOW (ref 3.8–10.5)

## 2023-02-04 PROCEDURE — 99222 1ST HOSP IP/OBS MODERATE 55: CPT

## 2023-02-04 PROCEDURE — 99233 SBSQ HOSP IP/OBS HIGH 50: CPT

## 2023-02-04 RX ORDER — COLCHICINE 0.6 MG
0.6 TABLET ORAL EVERY OTHER DAY
Refills: 0 | Status: DISCONTINUED | OUTPATIENT
Start: 2023-02-06 | End: 2023-02-07

## 2023-02-04 RX ADMIN — HYDROMORPHONE HYDROCHLORIDE 1 MILLIGRAM(S): 2 INJECTION INTRAMUSCULAR; INTRAVENOUS; SUBCUTANEOUS at 16:40

## 2023-02-04 RX ADMIN — CILOSTAZOL 100 MILLIGRAM(S): 100 TABLET ORAL at 05:15

## 2023-02-04 RX ADMIN — HEPARIN SODIUM 5000 UNIT(S): 5000 INJECTION INTRAVENOUS; SUBCUTANEOUS at 21:11

## 2023-02-04 RX ADMIN — Medication 0.6 MILLIGRAM(S): at 13:16

## 2023-02-04 RX ADMIN — HYDROMORPHONE HYDROCHLORIDE 1 MILLIGRAM(S): 2 INJECTION INTRAMUSCULAR; INTRAVENOUS; SUBCUTANEOUS at 07:44

## 2023-02-04 RX ADMIN — HYDROMORPHONE HYDROCHLORIDE 1 MILLIGRAM(S): 2 INJECTION INTRAMUSCULAR; INTRAVENOUS; SUBCUTANEOUS at 07:04

## 2023-02-04 RX ADMIN — CHLORHEXIDINE GLUCONATE 1 APPLICATION(S): 213 SOLUTION TOPICAL at 13:16

## 2023-02-04 RX ADMIN — CARVEDILOL PHOSPHATE 6.25 MILLIGRAM(S): 80 CAPSULE, EXTENDED RELEASE ORAL at 18:04

## 2023-02-04 RX ADMIN — Medication 100 MILLIGRAM(S): at 13:16

## 2023-02-04 RX ADMIN — HYDROMORPHONE HYDROCHLORIDE 1 MILLIGRAM(S): 2 INJECTION INTRAMUSCULAR; INTRAVENOUS; SUBCUTANEOUS at 16:16

## 2023-02-04 RX ADMIN — Medication 1 MILLIGRAM(S): at 13:16

## 2023-02-04 RX ADMIN — HEPARIN SODIUM 5000 UNIT(S): 5000 INJECTION INTRAVENOUS; SUBCUTANEOUS at 13:14

## 2023-02-04 RX ADMIN — CILOSTAZOL 100 MILLIGRAM(S): 100 TABLET ORAL at 18:07

## 2023-02-04 RX ADMIN — CILOSTAZOL 100 MILLIGRAM(S): 100 TABLET ORAL at 00:13

## 2023-02-04 RX ADMIN — CALCITRIOL 0.5 MICROGRAM(S): 0.5 CAPSULE ORAL at 13:15

## 2023-02-04 RX ADMIN — PANTOPRAZOLE SODIUM 40 MILLIGRAM(S): 20 TABLET, DELAYED RELEASE ORAL at 13:15

## 2023-02-04 RX ADMIN — CARVEDILOL PHOSPHATE 6.25 MILLIGRAM(S): 80 CAPSULE, EXTENDED RELEASE ORAL at 05:15

## 2023-02-04 RX ADMIN — HEPARIN SODIUM 5000 UNIT(S): 5000 INJECTION INTRAVENOUS; SUBCUTANEOUS at 05:16

## 2023-02-04 RX ADMIN — Medication 2000 UNIT(S): at 13:16

## 2023-02-04 NOTE — CONSULT NOTE ADULT - ATTENDING COMMENTS
Agree with above  continue renally dosed colchicine.   Will follow with you.       Jenny Dumas MD  [phone]

## 2023-02-04 NOTE — PROGRESS NOTE ADULT - SUBJECTIVE AND OBJECTIVE BOX
[facility] Division of Hospital Medicine  Jonathon Aldrich MD  Pager ([place], 8A-5P): [phone]  Other Times:  [phone]    Patient is a 82y old  Male who presents with a chief complaint of Anemia     (2023 17:29)    SUBJECTIVE / OVERNIGHT EVENTS:  Patient offers no new complaints.  When pressed about his foot pain, he states that it feels better. Promised to try to work with PT to see if he weight bear. No F/C, N/V, CP, SOB, Cough, lightheadedness, dizziness, abdominal pain, diarrhea, dysuria.    MEDICATIONS  (STANDING):  allopurinol 100 milliGRAM(s) Oral daily  calcitriol   Capsule 0.5 MICROGram(s) Oral daily  carvedilol 6.25 milliGRAM(s) Oral every 12 hours  chlorhexidine 2% Cloths 1 Application(s) Topical daily  cholecalciferol 2000 Unit(s) Oral daily  cilostazol 100 milliGRAM(s) Oral two times a day  colchicine 0.6 milliGRAM(s) Oral daily  folic acid 1 milliGRAM(s) Oral daily  heparin   Injectable 5000 Unit(s) SubCutaneous every 8 hours  pantoprazole  Injectable 40 milliGRAM(s) IV Push daily    MEDICATIONS  (PRN):  acetaminophen     Tablet .. 650 milliGRAM(s) Oral every 6 hours PRN Temp greater or equal to 38C (100.4F), Mild Pain (1 - 3)  aluminum hydroxide/magnesium hydroxide/simethicone Suspension 30 milliLiter(s) Oral every 6 hours PRN Dyspepsia  HYDROmorphone  Injectable 1 milliGRAM(s) IV Push every 4 hours PRN Severe Pain (7 - 10)  HYDROmorphone  Injectable 0.5 milliGRAM(s) IV Push every 4 hours PRN Moderate Pain (4 - 6)  meclizine 25 milliGRAM(s) Oral every 12 hours PRN Dizziness  melatonin 3 milliGRAM(s) Oral at bedtime PRN Insomnia  polyethylene glycol 3350 17 Gram(s) Oral daily PRN Constipation  senna 2 Tablet(s) Oral at bedtime PRN Constipation      Vital Signs Last 24 Hrs  T(C): 36.7 (2023 07:04), Max: 36.8 (2023 22:00)  T(F): 98.1 (2023 07:04), Max: 98.2 (2023 22:00)  HR: 82 (2023 07:04) (74 - 82)  BP: 126/62 (2023 07:04) (126/62 - 143/66)  BP(mean): --  RR: 18 (2023 07:04) (18 - 18)  SpO2: 97% (2023 07:04) (93% - 100%)    Parameters below as of 2023 07:04  Patient On (Oxygen Delivery Method): room air      CAPILLARY BLOOD GLUCOSE        I&O's Summary    2023 07:01  -  2023 07:00  --------------------------------------------------------  IN: 450 mL / OUT: 600 mL / NET: -150 mL        PHYSICAL EXAM:  CONSTITUTIONAL: NAD  EYES: PERRLA; conjunctiva and sclera clear  ENMT: Moist oral mucosa, no pharyngeal injection or exudates; normal dentition  NECK: Supple, no palpable masses; no thyromegaly  RESPIRATORY: Normal respiratory effort; lungs are clear to auscultation bilaterally  CARDIOVASCULAR: Regular rate and rhythm, normal S1 and S2, no murmur/rub/gallop; No lower extremity edema; Peripheral pulses are 2+ bilaterally; LUE edema and erythema at the IV access site.  ABDOMEN: Nontender to palpation, normoactive bowel sounds, no rebound/guarding; No hepatosplenomegaly  MUSCULOSKELETAL:  Unable to assess gait; no clubbing or cyanosis of digits; no joint swelling or tenderness to palpation; limited ROM of RLE due to pain; Rt foot erythematous, edema, tender to palpation but improving.  PSYCH: A+O to person, place, and time; affect appropriate  NEUROLOGY: CN 2-12 are intact and symmetric; no gross sensory deficits   SKIN: No rashes; no palpable lesions      LABS:                        7.9    2.47  )-----------( 72       ( 2023 03:30 )             25.3     02-04    139  |  107  |  69<H>  ----------------------------<  216<H>  4.6   |  17<L>  |  1.92<H>    Ca    8.4      2023 03:30  Phos  4.0       Mg     2.30         TPro  5.9<L>  /  Alb  2.7<L>  /  TBili  1.7<H>  /  DBili  x   /  AST  102<H>  /  ALT  279<H>  /  AlkPhos  148<H>            Urinalysis Basic - ( 2023 17:00 )    Color: Yellow / Appearance: Slightly Turbid / S.018 / pH: x  Gluc: x / Ketone: Negative  / Bili: Negative / Urobili: 3 mg/dL   Blood: x / Protein: 30 mg/dL / Nitrite: Negative   Leuk Esterase: Negative / RBC: 23 /HPF / WBC 2 /HPF   Sq Epi: x / Non Sq Epi: 2 /HPF / Bacteria: Negative        RADIOLOGY & ADDITIONAL TESTS:    Imaging Personally Reviewed:    Care Discussed with Consultants/Other Providers:

## 2023-02-04 NOTE — PROGRESS NOTE ADULT - SUBJECTIVE AND OBJECTIVE BOX
Patient is a 82y old  Male who presents with a chief complaint of Anemia (2023 13:01)       INTERVAL HPI/OVERNIGHT EVENTS:  Patient seen and evaluated at bedside.  Pt is resting comfortable in NAD. Denies N/V/F/C.    Allergies    No Known Allergies    Intolerances        Vital Signs Last 24 Hrs  T(C): 36.7 (2023 07:04), Max: 36.8 (2023 22:00)  T(F): 98.1 (2023 07:04), Max: 98.2 (2023 22:00)  HR: 82 (2023 07:04) (74 - 82)  BP: 126/62 (2023 07:04) (126/62 - 143/66)  BP(mean): --  RR: 18 (2023 07:04) (18 - 18)  SpO2: 97% (2023 07:04) (93% - 100%)    Parameters below as of 2023 07:04  Patient On (Oxygen Delivery Method): room air        LABS:                        7.9    2.47  )-----------( 72       ( 2023 03:30 )             25.3     02-04    139  |  107  |  69<H>  ----------------------------<  216<H>  4.6   |  17<L>  |  1.92<H>    Ca    8.4      2023 03:30  Phos  4.0     02-04  Mg     2.30     -04    TPro  5.9<L>  /  Alb  2.7<L>  /  TBili  1.7<H>  /  DBili  x   /  AST  102<H>  /  ALT  279<H>  /  AlkPhos  148<H>  02-04      Urinalysis Basic - ( 2023 17:00 )    Color: Yellow / Appearance: Slightly Turbid / S.018 / pH: x  Gluc: x / Ketone: Negative  / Bili: Negative / Urobili: 3 mg/dL   Blood: x / Protein: 30 mg/dL / Nitrite: Negative   Leuk Esterase: Negative / RBC: 23 /HPF / WBC 2 /HPF   Sq Epi: x / Non Sq Epi: 2 /HPF / Bacteria: Negative      CAPILLARY BLOOD GLUCOSE          Lower Extremity Physical Exam:  Vascular: DP/PT 2/4, B/L, CFT <3 seconds B/L, Temperature gradient warm to cool B/L.   Neuro: Epicritic sensation to level of digits   Musculoskeletal/Ortho: 1st MPJ HAV, lesser digit contractures B/L  Skin: Right foot 1st MPJ and DIPJ localized erythema, no open wounds. Left foot with no signs of infection

## 2023-02-04 NOTE — PROGRESS NOTE ADULT - NSPROGADDITIONALINFOA_GEN_ALL_CORE
Discussed with daughter Digna on 2/3 for 20 minutes on the phone. Answered all the questions.
Discussed with daughter Digna by the bedside for 20 minutes on 1/31.  Answered all the questions.
Discussed with daughter Digna by the bedside for 20 minutes on 1/31.  Answered all the questions.
Discussed with daughter Digna on 2/3 for 20 minutes on the phone. Answered all the questions.
Discussed with daughter Digna on the phone for 20 minutes on 1/30.  Answered all the questions.

## 2023-02-04 NOTE — CONSULT NOTE ADULT - SUBJECTIVE AND OBJECTIVE BOX
MASOOD MACARIO  [id number]    HISTORY OF PRESENT ILLNESS:  82M MDS, chronic diastolic CHF, HTN, Prostate CA s/p Prostectomy, Carotid Art stenosis s/p CEA, PAD, Renal Art Stenosis s/p stent, CKD, p/w Acute on chronic anemia from MDS c/b New Afib, acute Gout flare, and JUAN RAMON on CKD.     Per pt, has hx of Gout for many years, was on medications for gout ( unable to recall names), medication was stopped and resumed 1 week ago by PCP      last fever on 2/1, Tmax 101.7    BCx negative         PAST MEDICAL & SURGICAL HISTORY:  HTN - Hypertension      Hypercholesterolemia      PC (prostate cancer)  s/p surgical resection 3 years ago      Gout      Aneurysm, ascending aorta      H/O prostatectomy      H/O carotid endarterectomy      H/O renal artery stenosis  s/p stent in Left RA      Status post cataract extraction, unspecified laterality          Review of Systems:  Gen:  No fevers/chills, weight loss  HEENT: No blurry vision, no difficulty swallowing, no oral or nasal ulcers  CVS: No chest pain/palpitations  Resp: No SOB/wheezing  GI: No N/V/C/D/abdominal pain  MSK:  Skin: No new rashes  Neuro: No headaches    MEDICATIONS  (STANDING):  allopurinol 100 milliGRAM(s) Oral daily  calcitriol   Capsule 0.5 MICROGram(s) Oral daily  carvedilol 6.25 milliGRAM(s) Oral every 12 hours  chlorhexidine 2% Cloths 1 Application(s) Topical daily  cholecalciferol 2000 Unit(s) Oral daily  cilostazol 100 milliGRAM(s) Oral two times a day  colchicine 0.6 milliGRAM(s) Oral daily  folic acid 1 milliGRAM(s) Oral daily  heparin   Injectable 5000 Unit(s) SubCutaneous every 8 hours  pantoprazole  Injectable 40 milliGRAM(s) IV Push daily    MEDICATIONS  (PRN):  acetaminophen     Tablet .. 650 milliGRAM(s) Oral every 6 hours PRN Temp greater or equal to 38C (100.4F), Mild Pain (1 - 3)  aluminum hydroxide/magnesium hydroxide/simethicone Suspension 30 milliLiter(s) Oral every 6 hours PRN Dyspepsia  HYDROmorphone  Injectable 1 milliGRAM(s) IV Push every 4 hours PRN Severe Pain (7 - 10)  HYDROmorphone  Injectable 0.5 milliGRAM(s) IV Push every 4 hours PRN Moderate Pain (4 - 6)  meclizine 25 milliGRAM(s) Oral every 12 hours PRN Dizziness  melatonin 3 milliGRAM(s) Oral at bedtime PRN Insomnia  polyethylene glycol 3350 17 Gram(s) Oral daily PRN Constipation  senna 2 Tablet(s) Oral at bedtime PRN Constipation      Allergies    No Known Allergies    Intolerances        PERTINENT MEDICATION HISTORY:    SOCIAL HISTORY:  OCCUPATION:  TRAVEL HISTORY:    FAMILY HISTORY:  No pertinent family history in first degree relatives        Vital Signs Last 24 Hrs  T(C): 36.8 (04 Feb 2023 14:20), Max: 36.8 (03 Feb 2023 22:00)  T(F): 98.3 (04 Feb 2023 14:20), Max: 98.3 (04 Feb 2023 14:20)  HR: 78 (04 Feb 2023 14:20) (74 - 82)  BP: 134/70 (04 Feb 2023 14:20) (126/62 - 143/66)  BP(mean): --  RR: 18 (04 Feb 2023 14:20) (18 - 18)  SpO2: 100% (04 Feb 2023 14:20) (93% - 100%)    Parameters below as of 04 Feb 2023 14:20  Patient On (Oxygen Delivery Method): room air        Physical Exam:  General: No apparent distress  HEENT: EOMI, MMM  CVS: +S1/S2, RRR, no murmurs/rubs/gallops  Resp: CTA b/l. No crackles/wheezing  GI: Soft, NT/ND +BS  MSK:  + tophi on L. ear,   s/p drain on R foot 1st DIP joint    Neuro: AAOx3  Skin: no visible rashes    LABS:                        7.9    2.47  )-----------( 72       ( 04 Feb 2023 03:30 )             25.3     02-04    139  |  107  |  69<H>  ----------------------------<  216<H>  4.6   |  17<L>  |  1.92<H>    Ca    8.4      04 Feb 2023 03:30  Phos  4.0     02-04  Mg     2.30     02-04    TPro  5.9<L>  /  Alb  2.7<L>  /  TBili  1.7<H>  /  DBili  x   /  AST  102<H>  /  ALT  279<H>  /  AlkPhos  148<H>  02-04          RADIOLOGY & ADDITIONAL STUDIES:     MASOOD AMIRAH  [id number]    HISTORY OF PRESENT ILLNESS:  82M MDS, chronic diastolic CHF, HTN, Prostate CA s/p Prostectomy, Carotid Art stenosis s/p CEA, PAD, Renal Art Stenosis s/p stent, CKD, p/w Acute on chronic anemia from MDS c/b New Afib, acute Gout flare, and JUAN RAMON on CKD.     Gout hx:    Per pt, has hx of Gout for many years, was Allopurinol for gout,  Allopurinol was stopped and resumed 1 week ago by PCP    pt cannot recall last gout flare - pt also a poor historian   last fever on 2/1, Tmax 101.7    BCx negative 2/3    Risk factors for gout: CKD, high red meat consumption       PAST MEDICAL & SURGICAL HISTORY:  HTN - Hypertension  Hypercholesterolemia  PC (prostate cancer)  s/p surgical resection 3 years ago    Gout  Aneurysm, ascending aorta    H/O prostatectomy    H/O carotid endarterectomy    H/O renal artery stenosis  s/p stent in Left RA    Status post cataract extraction, unspecified laterality      Review of Systems:  Gen:  No fevers/chills, weight loss  HEENT: No blurry vision, no difficulty swallowing, no oral or nasal ulcers  CVS: No chest pain/palpitations  Resp: No SOB/wheezing  GI: No N/V/C/D/abdominal pain  MSK: + pain in b/l foot    Skin: No new rashes  Neuro: No headaches    MEDICATIONS  (STANDING):  allopurinol 100 milliGRAM(s) Oral daily  calcitriol   Capsule 0.5 MICROGram(s) Oral daily  carvedilol 6.25 milliGRAM(s) Oral every 12 hours  chlorhexidine 2% Cloths 1 Application(s) Topical daily  cholecalciferol 2000 Unit(s) Oral daily  cilostazol 100 milliGRAM(s) Oral two times a day  colchicine 0.6 milliGRAM(s) Oral daily  folic acid 1 milliGRAM(s) Oral daily  heparin   Injectable 5000 Unit(s) SubCutaneous every 8 hours  pantoprazole  Injectable 40 milliGRAM(s) IV Push daily    MEDICATIONS  (PRN):  acetaminophen     Tablet .. 650 milliGRAM(s) Oral every 6 hours PRN Temp greater or equal to 38C (100.4F), Mild Pain (1 - 3)  aluminum hydroxide/magnesium hydroxide/simethicone Suspension 30 milliLiter(s) Oral every 6 hours PRN Dyspepsia  HYDROmorphone  Injectable 1 milliGRAM(s) IV Push every 4 hours PRN Severe Pain (7 - 10)  HYDROmorphone  Injectable 0.5 milliGRAM(s) IV Push every 4 hours PRN Moderate Pain (4 - 6)  meclizine 25 milliGRAM(s) Oral every 12 hours PRN Dizziness  melatonin 3 milliGRAM(s) Oral at bedtime PRN Insomnia  polyethylene glycol 3350 17 Gram(s) Oral daily PRN Constipation  senna 2 Tablet(s) Oral at bedtime PRN Constipation      Allergies  No Known Allergies  Intolerances    FAMILY HISTORY:  No pertinent family history in first degree relatives    Vital Signs Last 24 Hrs  T(C): 36.8 (04 Feb 2023 14:20), Max: 36.8 (03 Feb 2023 22:00)  T(F): 98.3 (04 Feb 2023 14:20), Max: 98.3 (04 Feb 2023 14:20)  HR: 78 (04 Feb 2023 14:20) (74 - 82)  BP: 134/70 (04 Feb 2023 14:20) (126/62 - 143/66)  BP(mean): --  RR: 18 (04 Feb 2023 14:20) (18 - 18)  SpO2: 100% (04 Feb 2023 14:20) (93% - 100%)    Parameters below as of 04 Feb 2023 14:20  Patient On (Oxygen Delivery Method): room air    Physical Exam:  General: NAD  HEENT: EOMI, MMM,   + tophi on L. ear   GI: Soft, NT/ND +BS  MSK: b/l knee hypertrophy with limited ROM,   limited ROM also at b/l hip    s/p drain on R foot 1st DIP joint    Neuro: AAOx3  Skin: no visible rashes    LABS:                        7.9    2.47  )-----------( 72       ( 04 Feb 2023 03:30 )             25.3     02-04    139  |  107  |  69<H>  ----------------------------<  216<H>  4.6   |  17<L>  |  1.92<H>    Ca    8.4      04 Feb 2023 03:30  Phos  4.0     02-04  Mg     2.30     02-04    TPro  5.9<L>  /  Alb  2.7<L>  /  TBili  1.7<H>  /  DBili  x   /  AST  102<H>  /  ALT  279<H>  /  AlkPhos  148<H>  02-04      RADIOLOGY & ADDITIONAL STUDIES:  x< from: Xray Foot AP + Lateral + Oblique, Right (01.29.23 @ 19:36) >  IMPRESSION:  Generalized soft tissue swelling. No tracking gas collections or   radiopaque foreign bodies or gross radiographic evidence for   osteomyelitis.    No fractures or dislocations.    Tarsometatarsal alignment maintained without evidence for a Lisfranc   injury.    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 Preserved remaining visualized joint spaces.    Plantar and posterior calcaneal enthesophytes.    Generalized osteopenia otherwise no discrete lytic or blastic lesions.    Posterior tibial distribution vascular calcifications.    --- End of Report ---    < end of copied text >     MASOOD AMIRAH  [id number]    HISTORY OF PRESENT ILLNESS:  82M MDS, chronic diastolic CHF, HTN, Prostate CA s/p Prostectomy, Carotid Art stenosis s/p CEA, PAD, Renal Art Stenosis s/p stent, CKD, p/w Acute on chronic anemia from MDS c/b New Afib, acute Gout flare, and JUAN RAMON on CKD.     Gout hx:    Per pt, has hx of Gout for many years, was Allopurinol for gout,  Allopurinol was stopped and resumed 1 week ago by PCP    pt cannot recall last gout flare - pt also a poor historian   last fever on 2/1, Tmax 101.7    BCx negative 2/3    Risk factors for gout: CKD, high red meat consumption     PAST MEDICAL & SURGICAL HISTORY:  HTN - Hypertension  Hypercholesterolemia  PC (prostate cancer)  s/p surgical resection 3 years ago  Gout  Aneurysm, ascending aorta  H/O prostatectomy  H/O carotid endarterectomy  H/O renal artery stenosis  s/p stent in Left RA  Status post cataract extraction, unspecified laterality      Review of Systems:  Gen:  No fevers/chills, weight loss  HEENT: No blurry vision, no difficulty swallowing, no oral or nasal ulcers  CVS: No chest pain/palpitations  Resp: No SOB/wheezing  GI: No N/V/C/D/abdominal pain  MSK: + pain in b/l foot    Skin: No new rashes  Neuro: No headaches    MEDICATIONS  (STANDING):  allopurinol 100 milliGRAM(s) Oral daily  calcitriol   Capsule 0.5 MICROGram(s) Oral daily  carvedilol 6.25 milliGRAM(s) Oral every 12 hours  chlorhexidine 2% Cloths 1 Application(s) Topical daily  cholecalciferol 2000 Unit(s) Oral daily  cilostazol 100 milliGRAM(s) Oral two times a day  colchicine 0.6 milliGRAM(s) Oral daily  folic acid 1 milliGRAM(s) Oral daily  heparin   Injectable 5000 Unit(s) SubCutaneous every 8 hours  pantoprazole  Injectable 40 milliGRAM(s) IV Push daily    MEDICATIONS  (PRN):  acetaminophen     Tablet .. 650 milliGRAM(s) Oral every 6 hours PRN Temp greater or equal to 38C (100.4F), Mild Pain (1 - 3)  aluminum hydroxide/magnesium hydroxide/simethicone Suspension 30 milliLiter(s) Oral every 6 hours PRN Dyspepsia  HYDROmorphone  Injectable 1 milliGRAM(s) IV Push every 4 hours PRN Severe Pain (7 - 10)  HYDROmorphone  Injectable 0.5 milliGRAM(s) IV Push every 4 hours PRN Moderate Pain (4 - 6)  meclizine 25 milliGRAM(s) Oral every 12 hours PRN Dizziness  melatonin 3 milliGRAM(s) Oral at bedtime PRN Insomnia  polyethylene glycol 3350 17 Gram(s) Oral daily PRN Constipation  senna 2 Tablet(s) Oral at bedtime PRN Constipation      Allergies  No Known Allergies  Intolerances    FAMILY HISTORY:  No pertinent family history in first degree relatives    Vital Signs Last 24 Hrs  T(C): 36.8 (04 Feb 2023 14:20), Max: 36.8 (03 Feb 2023 22:00)  T(F): 98.3 (04 Feb 2023 14:20), Max: 98.3 (04 Feb 2023 14:20)  HR: 78 (04 Feb 2023 14:20) (74 - 82)  BP: 134/70 (04 Feb 2023 14:20) (126/62 - 143/66)  BP(mean): --  RR: 18 (04 Feb 2023 14:20) (18 - 18)  SpO2: 100% (04 Feb 2023 14:20) (93% - 100%)    Parameters below as of 04 Feb 2023 14:20  Patient On (Oxygen Delivery Method): room air    Physical Exam:  General: NAD  HEENT: EOMI, MMM,   + tophi on L. ear   GI: Soft, NT/ND +BS  MSK: b/l knee hypertrophy with limited ROM,   limited ROM also at b/l hip    s/p drain on R foot 1st DIP joint    Neuro: AAOx3  Skin: no visible rashes    LABS:                        7.9    2.47  )-----------( 72       ( 04 Feb 2023 03:30 )             25.3     02-04    139  |  107  |  69<H>  ----------------------------<  216<H>  4.6   |  17<L>  |  1.92<H>    Ca    8.4      04 Feb 2023 03:30  Phos  4.0     02-04  Mg     2.30     02-04    TPro  5.9<L>  /  Alb  2.7<L>  /  TBili  1.7<H>  /  DBili  x   /  AST  102<H>  /  ALT  279<H>  /  AlkPhos  148<H>  02-04      RADIOLOGY & ADDITIONAL STUDIES:  x< from: Xray Foot AP + Lateral + Oblique, Right (01.29.23 @ 19:36) >  IMPRESSION:  Generalized soft tissue swelling. No tracking gas collections or   radiopaque foreign bodies or gross radiographic evidence for   osteomyelitis.    No fractures or dislocations.    Tarsometatarsal alignment maintained without evidence for a Lisfranc   injury.    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 Preserved remaining visualized joint spaces.    Plantar and posterior calcaneal enthesophytes.    Generalized osteopenia otherwise no discrete lytic or blastic lesions.    Posterior tibial distribution vascular calcifications.    --- End of Report ---    < end of copied text >

## 2023-02-04 NOTE — PROGRESS NOTE ADULT - PROBLEM SELECTOR PLAN 4
- ECG = Sinus rhythm w/ occasional PVCs and PACs at 99 bpm, QTc = 503  - [id number] score = 5  - A-fib likely of multifactorial etiology; infection, dehydration, anemia.  Unlikely CHF (pro-BNP unlikely of cardiac origin)  - receiving antibiotic, being hydrated and being transfused PRBCs  - has ~ II/VI systolic murmur (pronounced over aortic and mitral valve areas)  - being followed by Cardiology team (appreciated)  - continuing with reduced dose of carvedilol (from 25 mg to 6.25 mg), (reduced due to borderline low blood pressure)  - continuing w/ statin  - aspirin and Plavix on hold presently (pls d/w Cardiology team re restarting same)  - f/u TTE (ordered)

## 2023-02-04 NOTE — PROGRESS NOTE ADULT - ASSESSMENT
on room air  afebrile  attest to foot pain much better- podiatry on board    acetaminophen     Tablet .. 650 milliGRAM(s) Oral every 6 hours PRN  allopurinol 100 milliGRAM(s) Oral daily  aluminum hydroxide/magnesium hydroxide/simethicone Suspension 30 milliLiter(s) Oral every 6 hours PRN  calcitriol   Capsule 0.5 MICROGram(s) Oral daily  carvedilol 6.25 milliGRAM(s) Oral every 12 hours  chlorhexidine 2% Cloths 1 Application(s) Topical daily  cholecalciferol 2000 Unit(s) Oral daily  cilostazol 100 milliGRAM(s) Oral two times a day  colchicine 0.6 milliGRAM(s) Oral daily  folic acid 1 milliGRAM(s) Oral daily  heparin   Injectable 5000 Unit(s) SubCutaneous every 8 hours  HYDROmorphone  Injectable 1 milliGRAM(s) IV Push every 4 hours PRN  HYDROmorphone  Injectable 0.5 milliGRAM(s) IV Push every 4 hours PRN  meclizine 25 milliGRAM(s) Oral every 12 hours PRN  melatonin 3 milliGRAM(s) Oral at bedtime PRN  pantoprazole  Injectable 40 milliGRAM(s) IV Push daily  polyethylene glycol 3350 17 Gram(s) Oral daily PRN  senna 2 Tablet(s) Oral at bedtime PRN      VITAL:  T(C): , Max: 36.8 (02-03-23 @ 22:00)  T(F): , Max: 98.3 (02-04-23 @ 14:20)  HR: 78 (02-04-23 @ 14:20)  BP: 134/70 (02-04-23 @ 14:20)  BP(mean): --  RR: 18 (02-04-23 @ 14:20)  SpO2: 100% (02-04-23 @ 14:20)  Wt(kg): --    02-03-23 @ 07:01  -  02-04-23 @ 07:00  --------------------------------------------------------  IN: 450 mL / OUT: 600 mL / NET: -150 mL        PHYSICAL EXAM:  Constitutional: NAD, Alert  HEENT: NCAT, DMM  Neck: Supple, No JVD  Respiratory: CTA-b/l  Cardiovascular: s1s2  Gastrointestinal: BS+, soft, NT/ND  Extremities: 1+ b/l LE edema; (+)b/l foot tenderness, R foot dressed  Neurological: no focal deficits; strength grossly intact  Back: no CVAT b/l    LABS:                          7.9    2.47  )-----------( 72       ( 04 Feb 2023 03:30 )             25.3     Na(139)/K(4.6)/Cl(107)/HCO3(17)/BUN(69)/Cr(1.92)Glu(216)/Ca(8.4)/Mg(2.30)/PO4(4.0)    02-04 @ 03:30  Na(141)/K(4.2)/Cl(110)/HCO3(18)/BUN(72)/Cr(2.17)Glu(187)/Ca(8.7)/Mg(2.50)/PO4(5.0)    02-03 @ 05:26  Na(141)/K(4.5)/Cl(109)/HCO3(19)/BUN(74)/Cr(2.30)Glu(167)/Ca(8.4)/Mg(2.40)/PO4(5.2)    02-02 @ 06:35            IMPRESSION: 82M w/ HTN, gout, prostate CA, MDS, USHA, and CKD4, 1/27/23 p/w anemia/new AFib    (1)Renal - CKD4 - USHA; resolved prerenally mediated JUAN RAMON from admission   (2)Anemia - hgb down relative to yesterday; Heme+GI on board - off Revlimid for now  (3)Metabolic acidosis - renally mediated - mild/okay for now  (4)EP - Cards on board - unclear if patient truly had AFib - not in AFib at present  (5)Foot pain - is this gout? septic arthritis? Afebrile;  s/p IV Cefepime and Vanco; podiatry on board    RECOMMEND:  (1)ID f/u regarding ?foot infection  (2)Colchicine as ordered  (3)Dose new meds for GFR 20-30ml/min      Moises Grant NP-[facility] [employer] [id number]  [phone]

## 2023-02-04 NOTE — PROGRESS NOTE ADULT - ASSESSMENT
82 y.o M w/ Right foot pain   - Pt was seen and evaluated.   - Afebrile, no leukocytosis, ESR 13, CRP 4.04  - Right X-Ray: Suspected gouty tophi with underlying well marginated erosions/pressure remodeling involving medial and lateral aspects of hallux proximal phalanx head and medial 1st metatarsal head.   - s/p bedside Incision and Drainage to R foot Hallux IPJ: today additional 4cc tophaceous / serosanguineous drainage mix expressed, no pus, no malodor, Right foot dorsal PIPJ erythema, right foot 1st MPJ erythema.   - Right foot wound culture showing no growth (prelim)  - Right foot Tophi sent to pathology, pending   - Pod Plan: Local wound care  - Discussed w/ Attending        82 y.o M w/ Right foot pain   - Pt was seen and evaluated.   - Afebrile, no leukocytosis, ESR 13, CRP 4.04  - Right X-Ray: Suspected gouty tophi with underlying well marginated erosions/pressure remodeling involving medial and lateral aspects of hallux proximal phalanx head and medial 1st metatarsal head.   - s/p bedside Incision and Drainage to R foot Hallux IPJ: today additional 4cc tophi mixed w/ serosanguineous drainage mix expressed, no pus, no malodor, Right foot dorsal PIPJ erythema, right foot 1st MPJ erythema.   - Right foot wound culture showing no growth (prelim)  - Right foot Tophi sent to pathology, pending   - Pod Plan: Local wound care  - Discussed w/ Attending

## 2023-02-04 NOTE — PROGRESS NOTE ADULT - PROBLEM SELECTOR PLAN 2
- macrocytic.  Hgb = 6.3, MCV = 110.9, in the context of MDS  - s/p 3 units of p-RBCs  - anemia is likely of multifactorial etiology; chronic disease, blood loss, dietary deficiency  - ferritin elevated  - GI consulted - on regular diet, cont ppi - unlikely to have continued GI bleed.  - monitor H&H and tranfuse prn if hb<7.5, rpt hb if less <7.5 transfuse prbc  - folic acid started  - Hem/Onc consulted, per heme hold revlimid  - IV infiltration of the PRBC transfusion on 1/30 - will elevate and monitor clinical status.  - another 1u PRBC transfusion on 2/2 with good response

## 2023-02-04 NOTE — PROGRESS NOTE ADULT - PROBLEM SELECTOR PLAN 1
Appreciate podiatry consult - s/p I+D of the tophi  - likely acute gout with delayed clinical improvement  - low clinical suspicion for infection.   - recent Foot xray did not yield obvious infectious etiology.  - Continue Colchicine - however improvement is very slow  - pain control  - Rheum consult requested for assistance in management.  - PT eval for safe disposition.

## 2023-02-04 NOTE — CONSULT NOTE ADULT - CONSULT REQUESTED DATE/TIME
02-Feb-2023 20:40
27-Jan-2023
28-Jan-2023
27-Jan-2023 14:01
04-Feb-2023 17:57
28-Jan-2023 12:14
28-Jan-2023 14:41
46 y/o F presents ambulatory to ED c/o 2 days dry nonproductive cough, no with pleuritic chest pain when coughing, 10/10. Pt has been using Robitussin at home with no relief. Pt currently denies SOB. Pt denies headache, dizziness, palpitations, fevers, chills, weakness at this time. No sick contacts. Safety maintained, family member at bedside.

## 2023-02-05 LAB
ALBUMIN SERPL ELPH-MCNC: 2.7 G/DL — LOW (ref 3.3–5)
ALP SERPL-CCNC: 145 U/L — HIGH (ref 40–120)
ALT FLD-CCNC: 198 U/L — HIGH (ref 4–41)
ANION GAP SERPL CALC-SCNC: 11 MMOL/L — SIGNIFICANT CHANGE UP (ref 7–14)
ANISOCYTOSIS BLD QL: SIGNIFICANT CHANGE UP
AST SERPL-CCNC: 57 U/L — HIGH (ref 4–40)
BASOPHILS # BLD AUTO: 0.25 K/UL — HIGH (ref 0–0.2)
BASOPHILS NFR BLD AUTO: 10.7 % — HIGH (ref 0–2)
BILIRUB SERPL-MCNC: 1.3 MG/DL — HIGH (ref 0.2–1.2)
BUN SERPL-MCNC: 59 MG/DL — HIGH (ref 7–23)
CALCIUM SERPL-MCNC: 8.4 MG/DL — SIGNIFICANT CHANGE UP (ref 8.4–10.5)
CHLORIDE SERPL-SCNC: 112 MMOL/L — HIGH (ref 98–107)
CO2 SERPL-SCNC: 19 MMOL/L — LOW (ref 22–31)
CREAT SERPL-MCNC: 1.85 MG/DL — HIGH (ref 0.5–1.3)
EGFR: 36 ML/MIN/1.73M2 — LOW
EOSINOPHIL # BLD AUTO: 0.31 K/UL — SIGNIFICANT CHANGE UP (ref 0–0.5)
EOSINOPHIL NFR BLD AUTO: 13.4 % — HIGH (ref 0–6)
GIANT PLATELETS BLD QL SMEAR: PRESENT — SIGNIFICANT CHANGE UP
GLUCOSE SERPL-MCNC: 117 MG/DL — HIGH (ref 70–99)
HCT VFR BLD CALC: 25.6 % — LOW (ref 39–50)
HGB BLD-MCNC: 7.9 G/DL — LOW (ref 13–17)
IANC: 1.18 K/UL — LOW (ref 1.8–7.4)
LYMPHOCYTES # BLD AUTO: 0.31 K/UL — LOW (ref 1–3.3)
LYMPHOCYTES # BLD AUTO: 13.4 % — SIGNIFICANT CHANGE UP (ref 13–44)
MACROCYTES BLD QL: SLIGHT — SIGNIFICANT CHANGE UP
MAGNESIUM SERPL-MCNC: 2.2 MG/DL — SIGNIFICANT CHANGE UP (ref 1.6–2.6)
MCHC RBC-ENTMCNC: 30.9 GM/DL — LOW (ref 32–36)
MCHC RBC-ENTMCNC: 32.9 PG — SIGNIFICANT CHANGE UP (ref 27–34)
MCV RBC AUTO: 106.7 FL — HIGH (ref 80–100)
MONOCYTES # BLD AUTO: 0.06 K/UL — SIGNIFICANT CHANGE UP (ref 0–0.9)
MONOCYTES NFR BLD AUTO: 2.7 % — SIGNIFICANT CHANGE UP (ref 2–14)
NEUTROPHILS # BLD AUTO: 1.41 K/UL — LOW (ref 1.8–7.4)
NEUTROPHILS NFR BLD AUTO: 59.8 % — SIGNIFICANT CHANGE UP (ref 43–77)
OVALOCYTES BLD QL SMEAR: SLIGHT — SIGNIFICANT CHANGE UP
PHOSPHATE SERPL-MCNC: 4.5 MG/DL — SIGNIFICANT CHANGE UP (ref 2.5–4.5)
PLAT MORPH BLD: NORMAL — SIGNIFICANT CHANGE UP
PLATELET # BLD AUTO: 53 K/UL — LOW (ref 150–400)
PLATELET COUNT - ESTIMATE: ABNORMAL
POIKILOCYTOSIS BLD QL AUTO: SLIGHT — SIGNIFICANT CHANGE UP
POLYCHROMASIA BLD QL SMEAR: SLIGHT — SIGNIFICANT CHANGE UP
POTASSIUM SERPL-MCNC: 4.7 MMOL/L — SIGNIFICANT CHANGE UP (ref 3.5–5.3)
POTASSIUM SERPL-SCNC: 4.7 MMOL/L — SIGNIFICANT CHANGE UP (ref 3.5–5.3)
PROT SERPL-MCNC: 5.7 G/DL — LOW (ref 6–8.3)
RBC # BLD: 2.4 M/UL — LOW (ref 4.2–5.8)
RBC # FLD: 22.7 % — HIGH (ref 10.3–14.5)
RBC BLD AUTO: NORMAL — SIGNIFICANT CHANGE UP
SCHISTOCYTES BLD QL AUTO: SLIGHT — SIGNIFICANT CHANGE UP
SODIUM SERPL-SCNC: 142 MMOL/L — SIGNIFICANT CHANGE UP (ref 135–145)
WBC # BLD: 2.35 K/UL — LOW (ref 3.8–10.5)
WBC # FLD AUTO: 2.35 K/UL — LOW (ref 3.8–10.5)

## 2023-02-05 PROCEDURE — 99233 SBSQ HOSP IP/OBS HIGH 50: CPT

## 2023-02-05 RX ORDER — HYDROMORPHONE HYDROCHLORIDE 2 MG/ML
1 INJECTION INTRAMUSCULAR; INTRAVENOUS; SUBCUTANEOUS EVERY 4 HOURS
Refills: 0 | Status: DISCONTINUED | OUTPATIENT
Start: 2023-02-05 | End: 2023-02-06

## 2023-02-05 RX ADMIN — HYDROMORPHONE HYDROCHLORIDE 1 MILLIGRAM(S): 2 INJECTION INTRAMUSCULAR; INTRAVENOUS; SUBCUTANEOUS at 20:44

## 2023-02-05 RX ADMIN — Medication 2000 UNIT(S): at 18:37

## 2023-02-05 RX ADMIN — HEPARIN SODIUM 5000 UNIT(S): 5000 INJECTION INTRAVENOUS; SUBCUTANEOUS at 13:32

## 2023-02-05 RX ADMIN — CARVEDILOL PHOSPHATE 6.25 MILLIGRAM(S): 80 CAPSULE, EXTENDED RELEASE ORAL at 05:40

## 2023-02-05 RX ADMIN — CILOSTAZOL 100 MILLIGRAM(S): 100 TABLET ORAL at 05:40

## 2023-02-05 RX ADMIN — Medication 100 MILLIGRAM(S): at 18:37

## 2023-02-05 RX ADMIN — CHLORHEXIDINE GLUCONATE 1 APPLICATION(S): 213 SOLUTION TOPICAL at 18:38

## 2023-02-05 RX ADMIN — HYDROMORPHONE HYDROCHLORIDE 1 MILLIGRAM(S): 2 INJECTION INTRAMUSCULAR; INTRAVENOUS; SUBCUTANEOUS at 00:44

## 2023-02-05 RX ADMIN — HYDROMORPHONE HYDROCHLORIDE 1 MILLIGRAM(S): 2 INJECTION INTRAMUSCULAR; INTRAVENOUS; SUBCUTANEOUS at 00:24

## 2023-02-05 RX ADMIN — CILOSTAZOL 100 MILLIGRAM(S): 100 TABLET ORAL at 18:37

## 2023-02-05 RX ADMIN — HYDROMORPHONE HYDROCHLORIDE 1 MILLIGRAM(S): 2 INJECTION INTRAMUSCULAR; INTRAVENOUS; SUBCUTANEOUS at 09:15

## 2023-02-05 RX ADMIN — HYDROMORPHONE HYDROCHLORIDE 1 MILLIGRAM(S): 2 INJECTION INTRAMUSCULAR; INTRAVENOUS; SUBCUTANEOUS at 08:49

## 2023-02-05 RX ADMIN — CALCITRIOL 0.5 MICROGRAM(S): 0.5 CAPSULE ORAL at 21:03

## 2023-02-05 RX ADMIN — PANTOPRAZOLE SODIUM 40 MILLIGRAM(S): 20 TABLET, DELAYED RELEASE ORAL at 18:38

## 2023-02-05 RX ADMIN — HYDROMORPHONE HYDROCHLORIDE 1 MILLIGRAM(S): 2 INJECTION INTRAMUSCULAR; INTRAVENOUS; SUBCUTANEOUS at 20:59

## 2023-02-05 RX ADMIN — HEPARIN SODIUM 5000 UNIT(S): 5000 INJECTION INTRAVENOUS; SUBCUTANEOUS at 05:41

## 2023-02-05 RX ADMIN — HEPARIN SODIUM 5000 UNIT(S): 5000 INJECTION INTRAVENOUS; SUBCUTANEOUS at 21:03

## 2023-02-05 RX ADMIN — Medication 1 MILLIGRAM(S): at 18:38

## 2023-02-05 NOTE — PROGRESS NOTE ADULT - SUBJECTIVE AND OBJECTIVE BOX
Patient is a 82y old  Male who presents with a chief complaint of Anemia (04 Feb 2023 17:57)       INTERVAL HPI/OVERNIGHT EVENTS:  Patient seen and evaluated at bedside.  Pt is resting comfortable in NAD. Denies N/V/F/C.    Allergies    No Known Allergies    Intolerances        Vital Signs Last 24 Hrs  T(C): 36.6 (05 Feb 2023 05:40), Max: 36.9 (05 Feb 2023 00:24)  T(F): 97.9 (05 Feb 2023 05:40), Max: 98.4 (05 Feb 2023 00:24)  HR: 113 (05 Feb 2023 05:40) (70 - 113)  BP: 139/74 (05 Feb 2023 05:40) (120/67 - 151/75)  BP(mean): --  RR: 18 (05 Feb 2023 05:40) (18 - 18)  SpO2: 98% (05 Feb 2023 05:40) (96% - 100%)    Parameters below as of 05 Feb 2023 05:40  Patient On (Oxygen Delivery Method): nasal cannula  O2 Flow (L/min): 2      LABS:                        7.9    2.35  )-----------( 53       ( 05 Feb 2023 05:39 )             25.6     02-05    142  |  112<H>  |  59<H>  ----------------------------<  117<H>  4.7   |  19<L>  |  1.85<H>    Ca    8.4      05 Feb 2023 05:39  Phos  4.5     02-05  Mg     2.20     02-05    TPro  5.7<L>  /  Alb  2.7<L>  /  TBili  1.3<H>  /  DBili  x   /  AST  57<H>  /  ALT  198<H>  /  AlkPhos  145<H>  02-05        CAPILLARY BLOOD GLUCOSE          Lower Extremity Physical Exam:    Lower Extremity Physical Exam:  Vascular: DP/PT 2/4, B/L, CFT <3 seconds B/L, Temperature gradient warm to cool B/L.   Neuro: Epicritic sensation to level of digits   Musculoskeletal/Ortho: 1st MPJ HAV, lesser digit contractures B/L  Skin:  s/p bedside Incision and Drainage to R foot Hallux IPJ 2/2/23: today additional 7cc tophi mixed w/ serosanguineous drainage mix expressed, no pus, no malodor, Right foot dorsal PIPJ erythema, right foot 1st MPJ erythema.      RADIOLOGY & ADDITIONAL TESTS:

## 2023-02-05 NOTE — PROGRESS NOTE ADULT - PROBLEM SELECTOR PLAN 2
- macrocytic.  Hgb = 6.3, MCV = 110.9, in the context of MDS  - s/p 3 units of p-RBCs  - anemia is likely of multifactorial etiology; chronic disease, blood loss, dietary deficiency  - ferritin elevated  - GI consulted - on regular diet, cont ppi - low clinical suspicion for GI bleed; GI signed off.  - monitor H&H and tranfuse prn if hb<7.5, rpt hb if less <7.5 transfuse prbc  - folic acid started  - Hem/Onc consulted, per heme hold revlimid  - IV infiltration of the PRBC transfusion on 1/30 - will elevate and monitor clinical status.  - another 1u PRBC transfusion on 2/2 with good response

## 2023-02-05 NOTE — PROGRESS NOTE ADULT - SUBJECTIVE AND OBJECTIVE BOX
[facility] Division of Hospital Medicine  Jonathon Aldrich MD  Pager ([facility], 8A-5P): [phone]  Other Times:  [phone]    Patient is a 82y old  Male who presents with a chief complaint of Anemia (05 Feb 2023 09:49)    SUBJECTIVE / OVERNIGHT EVENTS:  Patient still not motivated to get OOB with PT yesterday.  Advised patient to really get himself OOB as his foot pain is improved. No F/C, N/V, CP, SOB, Cough, lightheadedness, dizziness, abdominal pain, diarrhea, dysuria.    MEDICATIONS  (STANDING):  allopurinol 100 milliGRAM(s) Oral daily  calcitriol   Capsule 0.5 MICROGram(s) Oral daily  carvedilol 6.25 milliGRAM(s) Oral every 12 hours  chlorhexidine 2% Cloths 1 Application(s) Topical daily  cholecalciferol 2000 Unit(s) Oral daily  cilostazol 100 milliGRAM(s) Oral two times a day  folic acid 1 milliGRAM(s) Oral daily  heparin   Injectable 5000 Unit(s) SubCutaneous every 8 hours  pantoprazole  Injectable 40 milliGRAM(s) IV Push daily    MEDICATIONS  (PRN):  acetaminophen     Tablet .. 650 milliGRAM(s) Oral every 6 hours PRN Temp greater or equal to 38C (100.4F), Mild Pain (1 - 3)  aluminum hydroxide/magnesium hydroxide/simethicone Suspension 30 milliLiter(s) Oral every 6 hours PRN Dyspepsia  HYDROmorphone  Injectable 1 milliGRAM(s) IV Push every 4 hours PRN Severe Pain (7 - 10)  meclizine 25 milliGRAM(s) Oral every 12 hours PRN Dizziness  melatonin 3 milliGRAM(s) Oral at bedtime PRN Insomnia  polyethylene glycol 3350 17 Gram(s) Oral daily PRN Constipation  senna 2 Tablet(s) Oral at bedtime PRN Constipation      Vital Signs Last 24 Hrs  T(C): 36.6 (05 Feb 2023 05:40), Max: 36.9 (05 Feb 2023 00:24)  T(F): 97.9 (05 Feb 2023 05:40), Max: 98.4 (05 Feb 2023 00:24)  HR: 113 (05 Feb 2023 05:40) (70 - 113)  BP: 139/74 (05 Feb 2023 05:40) (120/67 - 151/75)  BP(mean): --  RR: 18 (05 Feb 2023 05:40) (18 - 18)  SpO2: 98% (05 Feb 2023 05:40) (96% - 100%)    Parameters below as of 05 Feb 2023 05:40  Patient On (Oxygen Delivery Method): nasal cannula  O2 Flow (L/min): 2    CAPILLARY BLOOD GLUCOSE        I&O's Summary    04 Feb 2023 07:01  -  05 Feb 2023 07:00  --------------------------------------------------------  IN: 400 mL / OUT: 400 mL / NET: 0 mL        PHYSICAL EXAM:  CONSTITUTIONAL: NAD  EYES: PERRLA; conjunctiva and sclera clear  ENMT: Moist oral mucosa, no pharyngeal injection or exudates; normal dentition  NECK: Supple, no palpable masses; no thyromegaly  RESPIRATORY: Normal respiratory effort; lungs are clear to auscultation bilaterally  CARDIOVASCULAR: Regular rate and rhythm, normal S1 and S2, no murmur/rub/gallop; No lower extremity edema; Peripheral pulses are 2+ bilaterally; LUE edema and erythema at the IV access site.  ABDOMEN: Nontender to palpation, normoactive bowel sounds, no rebound/guarding; No hepatosplenomegaly  MUSCULOSKELETAL:  Unable to assess gait; no clubbing or cyanosis of digits; no joint swelling or tenderness to palpation; limited ROM of RLE due to pain; Rt foot erythematous, edema, tender to palpation but improving.  PSYCH: A+O to person, place, and time; affect appropriate  NEUROLOGY: CN 2-12 are intact and symmetric; no gross sensory deficits   SKIN: No rashes; no palpable lesions    LABS:                        7.9    2.35  )-----------( 53       ( 05 Feb 2023 05:39 )             25.6     02-05    142  |  112<H>  |  59<H>  ----------------------------<  117<H>  4.7   |  19<L>  |  1.85<H>    Ca    8.4      05 Feb 2023 05:39  Phos  4.5     02-05  Mg     2.20     02-05    TPro  5.7<L>  /  Alb  2.7<L>  /  TBili  1.3<H>  /  DBili  x   /  AST  57<H>  /  ALT  198<H>  /  AlkPhos  145<H>  02-05              RADIOLOGY & ADDITIONAL TESTS:    Imaging Personally Reviewed:    Care Discussed with Consultants/Other Providers:

## 2023-02-05 NOTE — PROGRESS NOTE ADULT - ASSESSMENT
82 y.o M w/ Right foot pain   - Pt was seen and evaluated.   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   - s/p bedside Incision and Drainage to R foot Hallux IPJ 2/2/23: today additional 7cc tophi mixed w/ serosanguineous drainage mix expressed, no pus, no malodor, Right foot dorsal PIPJ erythema, right foot 1st MPJ erythema.   - Right foot wound culture showing no growth (prelim)  - Right foot Tophi sent to pathology, pending  - Rheumatology recs:   - recommend pt to continue Colchicine 0.6mg every other day (renally dose) - avoid NSAID  - Chronic management:  pt is on uric acid lowering agents, continue home Allopurinol 100mg qD, goal uric acid should be < 5 . appreciatd.   - Pod Plan: Local wound care.  - Discussed w/ Attending    82 y.o M w/ Right foot pain   - Pt was seen and evaluated.   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   - s/p bedside Incision and Drainage to R foot Hallux IPJ 2/2/23: today additional 7cc tophi mixed w/ serosanguineous drainage mix expressed, no pus, no malodor, Right foot dorsal PIPJ erythema, right foot 1st MPJ erythema.   - Right foot wound culture showing no growth (prelim)  - Right foot Tophi sent to pathology, pending  - Rheumatology recs:   - recommend pt to continue Colchicine 0.6mg every other day (renally dose) - avoid NSAID  - Chronic management:  pt is on uric acid lowering agents, continue home Allopurinol 100mg qD, goal uric acid should be < 5 . appreciatd.   - Podiatry stable for D/C pending FINAL Rheum recs,  follow up info in Discharge provider note.   - Discussed w/ Attending

## 2023-02-05 NOTE — PROGRESS NOTE ADULT - PROBLEM SELECTOR PLAN 1
Appreciate podiatry consult - s/p I+D of the tophi  - likely acute gout with delayed clinical improvement  - low clinical suspicion for infection.   - recent Foot xray did not yield obvious infectious etiology.  - Continue Colchicine qOD  - pain control  - Rheum consult requested for assistance in management.  - PT eval for safe disposition - patient and family prefers home.

## 2023-02-05 NOTE — PROGRESS NOTE ADULT - SUBJECTIVE AND OBJECTIVE BOX
Foot pain improved, resting comfortably in bed.       VITAL:  T(C): , Max: 36.9 (02-05-23 @ 00:24)  T(F): , Max: 98.4 (02-05-23 @ 00:24)  HR: 112 (02-05-23 @ 12:44)  BP: 142/75 (02-05-23 @ 12:44)  BP(mean): --  RR: 18 (02-05-23 @ 12:44)  SpO2: 100% (02-05-23 @ 12:44)  Wt(kg): --      PHYSICAL EXAM:  Constitutional: NAD, Alert  HEENT: NCAT, DMM  Neck: Supple, No JVD  Respiratory: CTA-b/l  Cardiovascular: s1s2  Gastrointestinal: BS+, soft, NT/ND  Extremities: 1+ b/l LE edema; (+)b/l foot tenderness, R foot dressed  Neurological: no focal deficits; strength grossly intact  Back: no CVAT b/l      LABS:                        7.9    2.35  )-----------( 53       ( 05 Feb 2023 05:39 )             25.6     Na(142)/K(4.7)/Cl(112)/HCO3(19)/BUN(59)/Cr(1.85)Glu(117)/Ca(8.4)/Mg(2.20)/PO4(4.5)    02-05 @ 05:39  Na(139)/K(4.6)/Cl(107)/HCO3(17)/BUN(69)/Cr(1.92)Glu(216)/Ca(8.4)/Mg(2.30)/PO4(4.0)    02-04 @ 03:30  Na(141)/K(4.2)/Cl(110)/HCO3(18)/BUN(72)/Cr(2.17)Glu(187)/Ca(8.7)/Mg(2.50)/PO4(5.0)    02-03 @ 05:26      IMPRESSION: 82M w/ HTN, gout, prostate CA, MDS, USHA, and CKD4, 1/27/23 p/w anemia/new AFib    (1)Renal - CKD4 - USHA; resolved prerenally mediated JUAN RAMON from admission   (2)Anemia - hgb down relative to yesterday; Heme+GI on board - off Revlimid for now  (3)Metabolic acidosis - renally mediated - mild/okay for now  (4)EP - Cards on board - unclear if patient truly had AFib - not in AFib at present  (5)Foot pain - is this gout? septic arthritis? Afebrile;  s/p IV Cefepime and Vanco; podiatry on board    RECOMMEND:  Colchicine as ordered)Dose new meds for GFR 20-30ml/min  no concern for infectious etiology of foot pain on xray per primary team   prbc transfusion per primary team  physical therapy, discharge planning  no renal objection to discharge       darien wardc   [facility]  Office: [phone]

## 2023-02-05 NOTE — PROGRESS NOTE ADULT - PROBLEM SELECTOR PLAN 4
- ECG = Sinus rhythm w/ occasional PVCs and PACs at 99 bpm, QTc = 503  - [id number] score = 5  - A-fib likely of multifactorial etiology; infection, dehydration, anemia.  Unlikely CHF (pro-BNP unlikely of cardiac origin)  - receiving antibiotic, being hydrated and being transfused PRBCs  - has ~ II/VI systolic murmur (pronounced over aortic and mitral valve areas)  - being followed by Cardiology team (appreciated)  - continuing with reduced dose of carvedilol (from 25 mg to 6.25 mg), (reduced due to borderline low blood pressure)  - continuing w/ statin  - aspirin and Plavix on hold presently (pls d/w Cardiology team re restarting same)  - f/u TTE (ordered)

## 2023-02-06 ENCOUNTER — NON-APPOINTMENT (OUTPATIENT)
Age: 83
End: 2023-02-06

## 2023-02-06 LAB
ALBUMIN SERPL ELPH-MCNC: 2.6 G/DL — LOW (ref 3.3–5)
ALP SERPL-CCNC: 123 U/L — HIGH (ref 40–120)
ALT FLD-CCNC: 136 U/L — HIGH (ref 4–41)
ANION GAP SERPL CALC-SCNC: 10 MMOL/L — SIGNIFICANT CHANGE UP (ref 7–14)
AST SERPL-CCNC: 33 U/L — SIGNIFICANT CHANGE UP (ref 4–40)
BASOPHILS # BLD AUTO: 0.07 K/UL — SIGNIFICANT CHANGE UP (ref 0–0.2)
BASOPHILS NFR BLD AUTO: 2.6 % — HIGH (ref 0–2)
BILIRUB SERPL-MCNC: 1 MG/DL — SIGNIFICANT CHANGE UP (ref 0.2–1.2)
BUN SERPL-MCNC: 64 MG/DL — HIGH (ref 7–23)
CALCIUM SERPL-MCNC: 8.7 MG/DL — SIGNIFICANT CHANGE UP (ref 8.4–10.5)
CHLORIDE SERPL-SCNC: 113 MMOL/L — HIGH (ref 98–107)
CO2 SERPL-SCNC: 18 MMOL/L — LOW (ref 22–31)
CREAT SERPL-MCNC: 1.86 MG/DL — HIGH (ref 0.5–1.3)
EGFR: 36 ML/MIN/1.73M2 — LOW
EOSINOPHIL # BLD AUTO: 0.33 K/UL — SIGNIFICANT CHANGE UP (ref 0–0.5)
EOSINOPHIL NFR BLD AUTO: 12.2 % — HIGH (ref 0–6)
GLUCOSE SERPL-MCNC: 131 MG/DL — HIGH (ref 70–99)
HCT VFR BLD CALC: 27.2 % — LOW (ref 39–50)
HGB BLD-MCNC: 8.5 G/DL — LOW (ref 13–17)
IANC: 1.55 K/UL — LOW (ref 1.8–7.4)
IMM GRANULOCYTES NFR BLD AUTO: 0.7 % — SIGNIFICANT CHANGE UP (ref 0–0.9)
LYMPHOCYTES # BLD AUTO: 0.62 K/UL — LOW (ref 1–3.3)
LYMPHOCYTES # BLD AUTO: 22.9 % — SIGNIFICANT CHANGE UP (ref 13–44)
MAGNESIUM SERPL-MCNC: 2.1 MG/DL — SIGNIFICANT CHANGE UP (ref 1.6–2.6)
MCHC RBC-ENTMCNC: 31.3 GM/DL — LOW (ref 32–36)
MCHC RBC-ENTMCNC: 33.2 PG — SIGNIFICANT CHANGE UP (ref 27–34)
MCV RBC AUTO: 106.3 FL — HIGH (ref 80–100)
MONOCYTES # BLD AUTO: 0.12 K/UL — SIGNIFICANT CHANGE UP (ref 0–0.9)
MONOCYTES NFR BLD AUTO: 4.4 % — SIGNIFICANT CHANGE UP (ref 2–14)
NEUTROPHILS # BLD AUTO: 1.55 K/UL — LOW (ref 1.8–7.4)
NEUTROPHILS NFR BLD AUTO: 57.2 % — SIGNIFICANT CHANGE UP (ref 43–77)
NRBC # BLD: 0 /100 WBCS — SIGNIFICANT CHANGE UP (ref 0–0)
NRBC # FLD: 0 K/UL — SIGNIFICANT CHANGE UP (ref 0–0)
PHOSPHATE SERPL-MCNC: 4.7 MG/DL — HIGH (ref 2.5–4.5)
PLATELET # BLD AUTO: 74 K/UL — LOW (ref 150–400)
POTASSIUM SERPL-MCNC: 4.5 MMOL/L — SIGNIFICANT CHANGE UP (ref 3.5–5.3)
POTASSIUM SERPL-SCNC: 4.5 MMOL/L — SIGNIFICANT CHANGE UP (ref 3.5–5.3)
PROT SERPL-MCNC: 5.6 G/DL — LOW (ref 6–8.3)
RBC # BLD: 2.56 M/UL — LOW (ref 4.2–5.8)
RBC # FLD: 21.8 % — HIGH (ref 10.3–14.5)
SODIUM SERPL-SCNC: 141 MMOL/L — SIGNIFICANT CHANGE UP (ref 135–145)
SURGICAL PATHOLOGY STUDY: SIGNIFICANT CHANGE UP
WBC # BLD: 2.71 K/UL — LOW (ref 3.8–10.5)
WBC # FLD AUTO: 2.71 K/UL — LOW (ref 3.8–10.5)

## 2023-02-06 PROCEDURE — 99232 SBSQ HOSP IP/OBS MODERATE 35: CPT | Mod: GC

## 2023-02-06 PROCEDURE — 99232 SBSQ HOSP IP/OBS MODERATE 35: CPT

## 2023-02-06 RX ORDER — OXYCODONE HYDROCHLORIDE 5 MG/1
5 TABLET ORAL EVERY 6 HOURS
Refills: 0 | Status: DISCONTINUED | OUTPATIENT
Start: 2023-02-06 | End: 2023-02-07

## 2023-02-06 RX ADMIN — Medication 3 MILLIGRAM(S): at 21:25

## 2023-02-06 RX ADMIN — Medication 0.6 MILLIGRAM(S): at 17:40

## 2023-02-06 RX ADMIN — HEPARIN SODIUM 5000 UNIT(S): 5000 INJECTION INTRAVENOUS; SUBCUTANEOUS at 21:22

## 2023-02-06 RX ADMIN — HYDROMORPHONE HYDROCHLORIDE 1 MILLIGRAM(S): 2 INJECTION INTRAMUSCULAR; INTRAVENOUS; SUBCUTANEOUS at 10:29

## 2023-02-06 RX ADMIN — CALCITRIOL 0.5 MICROGRAM(S): 0.5 CAPSULE ORAL at 17:40

## 2023-02-06 RX ADMIN — Medication 100 MILLIGRAM(S): at 17:39

## 2023-02-06 RX ADMIN — Medication 2000 UNIT(S): at 17:40

## 2023-02-06 RX ADMIN — HEPARIN SODIUM 5000 UNIT(S): 5000 INJECTION INTRAVENOUS; SUBCUTANEOUS at 15:05

## 2023-02-06 RX ADMIN — CHLORHEXIDINE GLUCONATE 1 APPLICATION(S): 213 SOLUTION TOPICAL at 17:42

## 2023-02-06 RX ADMIN — CILOSTAZOL 100 MILLIGRAM(S): 100 TABLET ORAL at 17:39

## 2023-02-06 RX ADMIN — CILOSTAZOL 100 MILLIGRAM(S): 100 TABLET ORAL at 06:34

## 2023-02-06 RX ADMIN — CARVEDILOL PHOSPHATE 6.25 MILLIGRAM(S): 80 CAPSULE, EXTENDED RELEASE ORAL at 00:02

## 2023-02-06 RX ADMIN — PANTOPRAZOLE SODIUM 40 MILLIGRAM(S): 20 TABLET, DELAYED RELEASE ORAL at 17:40

## 2023-02-06 RX ADMIN — OXYCODONE HYDROCHLORIDE 5 MILLIGRAM(S): 5 TABLET ORAL at 21:00

## 2023-02-06 RX ADMIN — OXYCODONE HYDROCHLORIDE 5 MILLIGRAM(S): 5 TABLET ORAL at 20:00

## 2023-02-06 RX ADMIN — Medication 1 MILLIGRAM(S): at 17:40

## 2023-02-06 RX ADMIN — CARVEDILOL PHOSPHATE 6.25 MILLIGRAM(S): 80 CAPSULE, EXTENDED RELEASE ORAL at 06:34

## 2023-02-06 RX ADMIN — CARVEDILOL PHOSPHATE 6.25 MILLIGRAM(S): 80 CAPSULE, EXTENDED RELEASE ORAL at 17:39

## 2023-02-06 RX ADMIN — HEPARIN SODIUM 5000 UNIT(S): 5000 INJECTION INTRAVENOUS; SUBCUTANEOUS at 06:35

## 2023-02-06 RX ADMIN — HYDROMORPHONE HYDROCHLORIDE 1 MILLIGRAM(S): 2 INJECTION INTRAMUSCULAR; INTRAVENOUS; SUBCUTANEOUS at 10:07

## 2023-02-06 NOTE — PROGRESS NOTE ADULT - SUBJECTIVE AND OBJECTIVE BOX
[facility] [phone]        Patient seen and examined no new complaints, reports he just received pain medication for right foot.         MEDICATIONS  (STANDING):  allopurinol 100 milliGRAM(s) Oral daily  calcitriol   Capsule 0.5 MICROGram(s) Oral daily  carvedilol 6.25 milliGRAM(s) Oral every 12 hours  chlorhexidine 2% Cloths 1 Application(s) Topical daily  cholecalciferol 2000 Unit(s) Oral daily  cilostazol 100 milliGRAM(s) Oral two times a day  colchicine 0.6 milliGRAM(s) Oral every other day  folic acid 1 milliGRAM(s) Oral daily  heparin   Injectable 5000 Unit(s) SubCutaneous every 8 hours  pantoprazole  Injectable 40 milliGRAM(s) IV Push daily      VITAL:  T(C): , Max: 37.2 (02-05-23 @ 20:50)  T(F): , Max: 99 (02-05-23 @ 20:50)  HR: 72 (02-06-23 @ 06:30)  BP: 120/57 (02-06-23 @ 06:30)  BP(mean): --  RR: 18 (02-06-23 @ 06:30)  SpO2: 99% (02-06-23 @ 06:30)  Wt(kg): --    I and O's:    02-05 @ 07:01  -  02-06 @ 07:00  --------------------------------------------------------  IN: 250 mL / OUT: 800 mL / NET: -550 mL          PHYSICAL EXAM:    Constitutional: NAD  Neck:  No JVD  Respiratory: CTAB/L  Cardiovascular: S1 and S2  Gastrointestinal: BS+, soft, NT/ND  Extremities: No peripheral edema + R foot dressing   Neurological: A/O x 3, no focal deficits  Psychiatric: Normal mood, normal affect  : No Min  Skin: No rashes  Access: Not applicable    LABS:                        8.5    2.71  )-----------( 74       ( 06 Feb 2023 05:55 )             27.2     02-06    141  |  113<H>  |  64<H>  ----------------------------<  131<H>  4.5   |  18<L>  |  1.86<H>    Ca    8.7      06 Feb 2023 05:55  Phos  4.7     02-06  Mg     2.10     02-06    TPro  5.6<L>  /  Alb  2.6<L>  /  TBili  1.0  /  DBili  x   /  AST  33  /  ALT  136<H>  /  AlkPhos  123<H>  02-06        IMPRESSION: 82M w/ HTN, gout, prostate CA, MDS, USHA, and CKD4, 1/27/23 p/w anemia/new AFib    (1)Renal - CKD4 - USHA; resolved prerenally mediated JUAN RAMON from admission stable   (2)Anemia - Heme+GI on board - off Revlimid for now  (3)Metabolic acidosis - renally mediated - mild/okay for now  (4)EP - Cards on board - unclear if patient truly had AFib - not in AFib at present  (5)Foot pain - is this gout? septic arthritis? s/p I&D follow up cx,  Afebrile;  s/p IV Cefepime and Vanco  (6)Hyperphosphatemia- due to JUAN RAMON, mild     RECOMMEND:  (1)Colchicine as ordered  (2)Dose new meds for GFR 35- 40 ml/min    Karina Agarwal NP   [facility]  [phone]          [facility] [phone]        Patient seen and examined no new complaints, reports he just received pain medication for right foot.         MEDICATIONS  (STANDING):  allopurinol 100 milliGRAM(s) Oral daily  calcitriol   Capsule 0.5 MICROGram(s) Oral daily  carvedilol 6.25 milliGRAM(s) Oral every 12 hours  chlorhexidine 2% Cloths 1 Application(s) Topical daily  cholecalciferol 2000 Unit(s) Oral daily  cilostazol 100 milliGRAM(s) Oral two times a day  colchicine 0.6 milliGRAM(s) Oral every other day  folic acid 1 milliGRAM(s) Oral daily  heparin   Injectable 5000 Unit(s) SubCutaneous every 8 hours  pantoprazole  Injectable 40 milliGRAM(s) IV Push daily      VITAL:  T(C): , Max: 37.2 (02-05-23 @ 20:50)  T(F): , Max: 99 (02-05-23 @ 20:50)  HR: 72 (02-06-23 @ 06:30)  BP: 120/57 (02-06-23 @ 06:30)  BP(mean): --  RR: 18 (02-06-23 @ 06:30)  SpO2: 99% (02-06-23 @ 06:30)  Wt(kg): --    I and O's:    02-05 @ 07:01  -  02-06 @ 07:00  --------------------------------------------------------  IN: 250 mL / OUT: 800 mL / NET: -550 mL          PHYSICAL EXAM:    Constitutional: NAD  Neck:  No JVD  Respiratory: CTAB/L  Cardiovascular: S1 and S2  Gastrointestinal: BS+, soft, NT/ND  Extremities: No peripheral edema + R foot dressing   Neurological: A/O x 3, no focal deficits  Psychiatric: Normal mood, normal affect  : No Min  Skin: No rashes  Access: Not applicable    LABS:                        8.5    2.71  )-----------( 74       ( 06 Feb 2023 05:55 )             27.2     02-06    141  |  113<H>  |  64<H>  ----------------------------<  131<H>  4.5   |  18<L>  |  1.86<H>    Ca    8.7      06 Feb 2023 05:55  Phos  4.7     02-06  Mg     2.10     02-06    TPro  5.6<L>  /  Alb  2.6<L>  /  TBili  1.0  /  DBili  x   /  AST  33  /  ALT  136<H>  /  AlkPhos  123<H>  02-06        IMPRESSION: 82M w/ HTN, gout, prostate CA, MDS, USHA, and CKD4, 1/27/23 p/w anemia/new AFib    (1)Renal - CKD4 - USHA; resolved prerenally mediated JUAN RAMON from admission stable   (2)Anemia - Heme+GI on board - off Revlimid for now  (3)Metabolic acidosis - renally mediated - mild/okay for now  (4)EP - Cards on board - unclear if patient truly had AFib - not in AFib at present  (5)Foot pain - is this gout? septic arthritis? s/p I&D follow up cx,  Afebrile;  s/p IV Cefepime and Vanco  (6)Hyperphosphatemia- due to JUAN RAMON, mild     RECOMMEND:  (1)Colchicine as ordered  (2)Dose new meds for GFR 35- 40 ml/min    Karina Agarwal NP   [facility]  [phone]       RENAL ATTENDING NOTE  Patient seen and examined with NP. Agree with assessment and plan as above.    Edis Cabral MD  [facility]  [phone]     [facility] [phone]        Patient seen and examined no new complaints, reports he just received pain medication for right foot.         MEDICATIONS  (STANDING):  allopurinol 100 milliGRAM(s) Oral daily  calcitriol   Capsule 0.5 MICROGram(s) Oral daily  carvedilol 6.25 milliGRAM(s) Oral every 12 hours  chlorhexidine 2% Cloths 1 Application(s) Topical daily  cholecalciferol 2000 Unit(s) Oral daily  cilostazol 100 milliGRAM(s) Oral two times a day  colchicine 0.6 milliGRAM(s) Oral every other day  folic acid 1 milliGRAM(s) Oral daily  heparin   Injectable 5000 Unit(s) SubCutaneous every 8 hours  pantoprazole  Injectable 40 milliGRAM(s) IV Push daily      VITAL:  T(C): , Max: 37.2 (02-05-23 @ 20:50)  T(F): , Max: 99 (02-05-23 @ 20:50)  HR: 72 (02-06-23 @ 06:30)  BP: 120/57 (02-06-23 @ 06:30)  BP(mean): --  RR: 18 (02-06-23 @ 06:30)  SpO2: 99% (02-06-23 @ 06:30)  Wt(kg): --    I and O's:    02-05 @ 07:01  -  02-06 @ 07:00  --------------------------------------------------------  IN: 250 mL / OUT: 800 mL / NET: -550 mL          PHYSICAL EXAM:    Constitutional: NAD  Neck:  No JVD  Respiratory: CTAB/L  Cardiovascular: S1 and S2  Gastrointestinal: BS+, soft, NT/ND  Extremities: No peripheral edema + R foot dressing   Neurological: A/O x 3, no focal deficits  Psychiatric: Normal mood, normal affect  : No Min  Skin: No rashes  Access: Not applicable    LABS:                        8.5    2.71  )-----------( 74       ( 06 Feb 2023 05:55 )             27.2     02-06    141  |  113<H>  |  64<H>  ----------------------------<  131<H>  4.5   |  18<L>  |  1.86<H>    Ca    8.7      06 Feb 2023 05:55  Phos  4.7     02-06  Mg     2.10     02-06    TPro  5.6<L>  /  Alb  2.6<L>  /  TBili  1.0  /  DBili  x   /  AST  33  /  ALT  136<H>  /  AlkPhos  123<H>  02-06        IMPRESSION: 82M w/ HTN, gout, prostate CA, MDS, USHA, and CKD4, 1/27/23 p/w anemia/new AFib    (1)Renal - CKD4 - USHA; resolved prerenally mediated JUAN RAMON from admission stable   (2)Anemia - Heme+GI on board - off Revlimid for now  (3)Metabolic acidosis - renally mediated - mild/okay for now  (4)EP - Cards on board - unclear if patient truly had AFib - not in AFib at present  (5)Foot pain - is this gout? septic arthritis? s/p I&D follow up cx,  Afebrile;  s/p IV Cefepime and Vanco  (6)Hyperphosphatemia- due to JUAN RAMON, mild     RECOMMEND:  (1)Colchicine as ordered  (2)Dose new meds for GFR 35- 40 ml/min    Karina Agarwal NP   [facility]  [phone]       RENAL ATTENDING NOTE  Patient seen and examined with NP. Agree with assessment and plan as above.  If for discharge, patient can f/u at my office in 2-6 weeks    Edis Cabral MD  [facility]  [phone]

## 2023-02-06 NOTE — CHART NOTE - NSCHARTNOTEFT_GEN_A_CORE
Patient with a diagnosis of ICD 10: M10.9 R26.7 is in need of standard wheelchair. Pt cannot perform ADLs with the use of walker, cane, or crutches. Pt can self propell to move about in home. PATRIZIA: 99 months.

## 2023-02-06 NOTE — PROVIDER CONTACT NOTE (OTHER) - RECOMMENDATIONS
notify provider
notify provider
Notify provider.
notify provider and pause transfusion immediately
Notify provider.

## 2023-02-06 NOTE — PROGRESS NOTE ADULT - PROBLEM SELECTOR PLAN 4
- ECG = Sinus rhythm w/ occasional PVCs and PACs at 99 bpm, QTc = 503  - [id number] score = 5  - A-fib likely of multifactorial etiology; infection, dehydration, anemia.  Unlikely CHF (pro-BNP unlikely of cardiac origin)  - receiving antibiotic, being hydrated and being transfused PRBCs  - has ~ II/VI systolic murmur (pronounced over aortic and mitral valve areas)  - being followed by Cardiology team (appreciated)  - continuing with reduced dose of carvedilol (from 25 mg to 6.25 mg), (reduced due to borderline low blood pressure)  - continuing w/ statin  - aspirin and Plavix on hold presently (pls d/w Cardiology team re restarting same)  -  TTE showed . Normal left ventricular systolic function. No segmental wall motion abnormalities.

## 2023-02-06 NOTE — PROGRESS NOTE ADULT - SUBJECTIVE AND OBJECTIVE BOX
Patient is a 82y old  Male who presents with a chief complaint of Anemia (05 Feb 2023 15:02)       INTERVAL HPI/OVERNIGHT EVENTS:  Patient seen and evaluated at bedside.  Pt is resting comfortable in NAD. Denies N/V/F/C.    Allergies    No Known Allergies    Intolerances        Vital Signs Last 24 Hrs  T(C): 36.3 (06 Feb 2023 06:30), Max: 37.2 (05 Feb 2023 20:50)  T(F): 97.4 (06 Feb 2023 06:30), Max: 99 (05 Feb 2023 20:50)  HR: 72 (06 Feb 2023 06:30) (69 - 112)  BP: 120/57 (06 Feb 2023 06:30) (114/60 - 142/75)  BP(mean): --  RR: 18 (06 Feb 2023 06:30) (18 - 18)  SpO2: 99% (06 Feb 2023 06:30) (95% - 100%)    Parameters below as of 06 Feb 2023 06:30  Patient On (Oxygen Delivery Method): nasal cannula  O2 Flow (L/min): 2      LABS:                        8.5    2.71  )-----------( 74       ( 06 Feb 2023 05:55 )             27.2     02-06    141  |  113<H>  |  64<H>  ----------------------------<  131<H>  4.5   |  18<L>  |  1.86<H>    Ca    8.7      06 Feb 2023 05:55  Phos  4.7     02-06  Mg     2.10     02-06    TPro  5.6<L>  /  Alb  2.6<L>  /  TBili  1.0  /  DBili  x   /  AST  33  /  ALT  136<H>  /  AlkPhos  123<H>  02-06        CAPILLARY BLOOD GLUCOSE      POCT Blood Glucose.: 156 mg/dL (05 Feb 2023 22:43)      Lower Extremity Physical Exam:  Vascular: DP/PT 2/4, B/L, CFT <3 seconds B/L, Temperature gradient warm to cool B/L.   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  RADIOLOGY & ADDITIONAL TESTS:

## 2023-02-06 NOTE — PROVIDER CONTACT NOTE (OTHER) - NAME OF MD/NP/PA/DO NOTIFIED:
Ayana Meng
ASHLYN Herrera
Artemio Meng
Meenakshi Eller
ASHLYN Santiago
Meenakshi Eller ACP
Ayana Meng

## 2023-02-06 NOTE — PROGRESS NOTE ADULT - SUBJECTIVE AND OBJECTIVE BOX
MASOOD AMIRAH  [id number]    INTERVAL HPI/OVERNIGHT EVENTS: Pt says he is feeling better. Pain in foot improved. Says he wants to go home. Denies fever, chills, chest pain, sob, rash.    MEDICATIONS  (STANDING):  allopurinol 100 milliGRAM(s) Oral daily  calcitriol   Capsule 0.5 MICROGram(s) Oral daily  carvedilol 6.25 milliGRAM(s) Oral every 12 hours  chlorhexidine 2% Cloths 1 Application(s) Topical daily  cholecalciferol 2000 Unit(s) Oral daily  cilostazol 100 milliGRAM(s) Oral two times a day  colchicine 0.6 milliGRAM(s) Oral every other day  folic acid 1 milliGRAM(s) Oral daily  heparin   Injectable 5000 Unit(s) SubCutaneous every 8 hours  pantoprazole  Injectable 40 milliGRAM(s) IV Push daily    MEDICATIONS  (PRN):  acetaminophen     Tablet .. 650 milliGRAM(s) Oral every 6 hours PRN Temp greater or equal to 38C (100.4F), Mild Pain (1 - 3)  aluminum hydroxide/magnesium hydroxide/simethicone Suspension 30 milliLiter(s) Oral every 6 hours PRN Dyspepsia  meclizine 25 milliGRAM(s) Oral every 12 hours PRN Dizziness  melatonin 3 milliGRAM(s) Oral at bedtime PRN Insomnia  oxyCODONE    IR 5 milliGRAM(s) Oral every 6 hours PRN Severe Pain (7 - 10)  polyethylene glycol 3350 17 Gram(s) Oral daily PRN Constipation  senna 2 Tablet(s) Oral at bedtime PRN Constipation      Allergies    No Known Allergies    Intolerances        Review of Systems: As above    Vital Signs Last 24 Hrs  T(C): 36.8 (06 Feb 2023 17:38), Max: 37.2 (05 Feb 2023 20:50)  T(F): 98.2 (06 Feb 2023 17:38), Max: 99 (05 Feb 2023 20:50)  HR: 80 (06 Feb 2023 17:38) (69 - 80)  BP: 118/61 (06 Feb 2023 17:38) (110/60 - 130/75)  BP(mean): --  RR: 18 (06 Feb 2023 17:38) (18 - 18)  SpO2: 99% (06 Feb 2023 17:38) (95% - 99%)    Parameters below as of 06 Feb 2023 17:38  Patient On (Oxygen Delivery Method): room air        Physical Exam:  General: Breathing comfortably on room air, no distress  HEENT: EOMI, MMM, no oral ulcers  CVS: +S1/S2, RRR  Resp: CTA b/l. No crackles/wheezing  GI: Soft, NT/ND +BS  MSK: no synovitis. L toe wrapped in gauze.  Skin: no visible rashes. No visible tophi    LABS:                        8.5    2.71  )-----------( 74       ( 06 Feb 2023 05:55 )             27.2     02-06    141  |  113<H>  |  64<H>  ----------------------------<  131<H>  4.5   |  18<L>  |  1.86<H>    Ca    8.7      06 Feb 2023 05:55  Phos  4.7     02-06  Mg     2.10     02-06    TPro  5.6<L>  /  Alb  2.6<L>  /  TBili  1.0  /  DBili  x   /  AST  33  /  ALT  136<H>  /  AlkPhos  123<H>  02-06         MASOOD AMIRAH  [id number]    INTERVAL HPI/OVERNIGHT EVENTS: Pt says he is feeling better. Pain in foot improved. Says he wants to go home. Denies fever, chills, chest pain, sob, rash.    MEDICATIONS  (STANDING):  allopurinol 100 milliGRAM(s) Oral daily  calcitriol   Capsule 0.5 MICROGram(s) Oral daily  carvedilol 6.25 milliGRAM(s) Oral every 12 hours  chlorhexidine 2% Cloths 1 Application(s) Topical daily  cholecalciferol 2000 Unit(s) Oral daily  cilostazol 100 milliGRAM(s) Oral two times a day  colchicine 0.6 milliGRAM(s) Oral every other day  folic acid 1 milliGRAM(s) Oral daily  heparin   Injectable 5000 Unit(s) SubCutaneous every 8 hours  pantoprazole  Injectable 40 milliGRAM(s) IV Push daily    MEDICATIONS  (PRN):  acetaminophen     Tablet .. 650 milliGRAM(s) Oral every 6 hours PRN Temp greater or equal to 38C (100.4F), Mild Pain (1 - 3)  aluminum hydroxide/magnesium hydroxide/simethicone Suspension 30 milliLiter(s) Oral every 6 hours PRN Dyspepsia  meclizine 25 milliGRAM(s) Oral every 12 hours PRN Dizziness  melatonin 3 milliGRAM(s) Oral at bedtime PRN Insomnia  oxyCODONE    IR 5 milliGRAM(s) Oral every 6 hours PRN Severe Pain (7 - 10)  polyethylene glycol 3350 17 Gram(s) Oral daily PRN Constipation  senna 2 Tablet(s) Oral at bedtime PRN Constipation      Allergies    No Known Allergies    Intolerances        Review of Systems: As above    Vital Signs Last 24 Hrs  T(C): 36.8 (06 Feb 2023 17:38), Max: 37.2 (05 Feb 2023 20:50)  T(F): 98.2 (06 Feb 2023 17:38), Max: 99 (05 Feb 2023 20:50)  HR: 80 (06 Feb 2023 17:38) (69 - 80)  BP: 118/61 (06 Feb 2023 17:38) (110/60 - 130/75)  BP(mean): --  RR: 18 (06 Feb 2023 17:38) (18 - 18)  SpO2: 99% (06 Feb 2023 17:38) (95% - 99%)    Parameters below as of 06 Feb 2023 17:38  Patient On (Oxygen Delivery Method): room air    Physical Exam:  General: Breathing comfortably on room air, no distress  HEENT: EOMI, MMM, no oral ulcers  CVS: +S1/S2, RRR  Resp: CTA b/l. No crackles/wheezing  GI: Soft, NT/ND +BS  MSK: no synovitis. L toe wrapped in gauze.  Skin: no visible rashes. No visible tophi    LABS:                        8.5    2.71  )-----------( 74       ( 06 Feb 2023 05:55 )             27.2     02-06    141  |  113<H>  |  64<H>  ----------------------------<  131<H>  4.5   |  18<L>  |  1.86<H>    Ca    8.7      06 Feb 2023 05:55  Phos  4.7     02-06  Mg     2.10     02-06    TPro  5.6<L>  /  Alb  2.6<L>  /  TBili  1.0  /  DBili  x   /  AST  33  /  ALT  136<H>  /  AlkPhos  123<H>  02-06    RADIOLOGY   Xray Foot AP + Lateral + Oblique, Right (01.29.23 @ 19:36) >  Generalized soft tissue swelling. No tracking gas collections or   radiopaque foreign bodies or gross radiographic evidence for   osteomyelitis.    No fractures or dislocations.    Tarsometatarsal alignment maintained without evidence for a Lisfranc   injury.    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 Preserved remaining visualized joint spaces.    Plantar and posterior calcaneal enthesophytes.    Generalized osteopenia otherwise no discrete lytic or blastic lesions.    Posterior tibial distribution vascular calcifications.      < end of copied text >

## 2023-02-06 NOTE — PROGRESS NOTE ADULT - SUBJECTIVE AND OBJECTIVE BOX
Patient is a 82y old  Male who presents with a chief complaint of Anemia (06 Feb 2023 10:47)      SUBJECTIVE / OVERNIGHT EVENTS: Pt seen and examined at 11:35am, overnight events noted ( per nsg had blood on the pad), Pt reports improvement in his rt leg/toe pain, had bm yesterday, does not want to go to rehab, wants to go home, waiting for Physical therapy, no other new issues reported.        MEDICATIONS  (STANDING):  allopurinol 100 milliGRAM(s) Oral daily  calcitriol   Capsule 0.5 MICROGram(s) Oral daily  carvedilol 6.25 milliGRAM(s) Oral every 12 hours  chlorhexidine 2% Cloths 1 Application(s) Topical daily  cholecalciferol 2000 Unit(s) Oral daily  cilostazol 100 milliGRAM(s) Oral two times a day  colchicine 0.6 milliGRAM(s) Oral every other day  folic acid 1 milliGRAM(s) Oral daily  heparin   Injectable 5000 Unit(s) SubCutaneous every 8 hours  pantoprazole  Injectable 40 milliGRAM(s) IV Push daily    MEDICATIONS  (PRN):  acetaminophen     Tablet .. 650 milliGRAM(s) Oral every 6 hours PRN Temp greater or equal to 38C (100.4F), Mild Pain (1 - 3)  aluminum hydroxide/magnesium hydroxide/simethicone Suspension 30 milliLiter(s) Oral every 6 hours PRN Dyspepsia  HYDROmorphone  Injectable 1 milliGRAM(s) IV Push every 4 hours PRN Severe Pain (7 - 10)  meclizine 25 milliGRAM(s) Oral every 12 hours PRN Dizziness  melatonin 3 milliGRAM(s) Oral at bedtime PRN Insomnia  polyethylene glycol 3350 17 Gram(s) Oral daily PRN Constipation  senna 2 Tablet(s) Oral at bedtime PRN Constipation      Vital Signs Last 24 Hrs  T(C): 36.3 (06 Feb 2023 06:30), Max: 37.2 (05 Feb 2023 20:50)  T(F): 97.4 (06 Feb 2023 06:30), Max: 99 (05 Feb 2023 20:50)  HR: 72 (06 Feb 2023 06:30) (69 - 74)  BP: 120/57 (06 Feb 2023 06:30) (114/60 - 130/75)  BP(mean): --  RR: 18 (06 Feb 2023 06:30) (18 - 18)  SpO2: 99% (06 Feb 2023 06:30) (95% - 99%)    Parameters below as of 06 Feb 2023 06:30  Patient On (Oxygen Delivery Method): nasal cannula  O2 Flow (L/min): 2    CAPILLARY BLOOD GLUCOSE      POCT Blood Glucose.: 156 mg/dL (05 Feb 2023 22:43)    I&O's Summary    05 Feb 2023 07:01  -  06 Feb 2023 07:00  --------------------------------------------------------  IN: 250 mL / OUT: 800 mL / NET: -550 mL        PHYSICAL EXAM:   GENERAL: NAD, well-developed  CHEST/LUNG: Clear to auscultation bilaterally; No wheeze  HEART: Regular rate and rhythm, + LESA  ABDOMEN: Soft, Nontender, Nondistended  EXTREMITIES: + b/l LE edema, rt foot wrapped, noted rt great toe erythema improving  PSYCH: Calm  NEUROLOGY: AAOx3  SKIN: No rashes or lesions                       8.5    2.71  )-----------( 74       ( 06 Feb 2023 05:55 )             27.2     02-06    141  |  113<H>  |  64<H>  ----------------------------<  131<H>  4.5   |  18<L>  |  1.86<H>    Ca    8.7      06 Feb 2023 05:55  Phos  4.7     02-06  Mg     2.10     02-06    TPro  5.6<L>  /  Alb  2.6<L>  /  TBili  1.0  /  DBili  x   /  AST  33  /  ALT  136<H>  /  AlkPhos  123<H>  02-06              RADIOLOGY & ADDITIONAL TESTS:  < from: Transthoracic Echocardiogram (01.31.23 @ 08:45) >   Mitral annular calcification, otherwise normal mitral  valve. Mild-moderate mitral regurgitation.  2. Calcified trileaflet aortic valve with decreased  opening. Peak transaortic valve gradient equals 29 mm Hg,  mean transaortic valve gradient equals 20 mm Hg, estimated  aortic valve area equals 1.4 sqcm (by continuity equation),  aortic valve velocity time integral equals 59 cm,  consistent with moderate aortic stenosis. Minimal aortic  regurgitation.  3. Normal left ventricular internal dimensions and wall  thicknesses.  4. Normal left ventricular systolic function. No segmental  wall motion abnormalities.  5. Normal right ventricular size and function.    < end of copied text >    Imaging Personally Reviewed:    Consultant(s) Notes Reviewed:      Care Discussed with Consultants/Other Providers:

## 2023-02-06 NOTE — PROVIDER CONTACT NOTE (OTHER) - ACTION/TREATMENT ORDERED:
PA made aware
transfusion resumed as per ACP Ayana Meng
PA made aware
Provider notified and aware, ordered to give scheduled AM Carvedilol.
provider notified.
continuing to monitor patient on telemetry . no new orders at this time
Provider notified and aware. As per ACP to just do am labs in the morning and continue to monitor.

## 2023-02-06 NOTE — PROGRESS NOTE ADULT - ASSESSMENT
82M MDS, chronic diastolic CHF, HTN, Prostate CA s/p Prostectomy, Carotid Art stenosis s/p CEA, PAD, Renal Art Stenosis s/p stent, CKD, p/w Acute on chronic anemia from MDS c/b New Afib, acute Gout flare, and JUAN RAMON on CKD. Rheumatology consult for gout management      Tophaceous erosive gout   - Risk factors for gout flare: CKD, high red meat consumption  -s/p drain of R. foot 1st PIPJ - 3cc of tophaceous fluid, culture growing staph - however is s/p treatment per podiatry  - already received prednisone 30mg x 4 days (1/28-1/31), received colchicine 0.6mg x 3 days  - avoid NSAID    - uric acid=6.7    Plan:  -c/w Colchicine 0.6mg every other day (renally dose) for gout prophylaxis  -continue home Allopurinol 100mg qD, goal uric acid should be < 5   -patient can follow up w/ Rheumatology Dr. Dumas. We will arrange follow up    Jenny Dumas MD  [address] [facility] [address], [place] [address]  Phone: [phone]    Will sign off. Please call/TEAMS if any questions  Discussed with Attending Dr. Piter Mora DO  Rheumatology Fellow  Pager: [phone]  Available on TEAMS

## 2023-02-06 NOTE — PROVIDER CONTACT NOTE (OTHER) - DATE AND TIME:
01-Feb-2023 11:10
30-Jan-2023 12:15
29-Jan-2023 10:50
06-Feb-2023 03:00
02-Feb-2023 10:47
02-Feb-2023 13:05
05-Feb-2023 05:35

## 2023-02-06 NOTE — PROGRESS NOTE ADULT - PROBLEM SELECTOR PLAN 1
Appreciate podiatry consult - s/p I+D of the tophi  - likely acute gout with delayed clinical improvement  - low clinical suspicion for infection.   - recent Foot xray did not yield obvious infectious etiology.  - Continue Colchicine qOD, cont allopurinol  - pain control  - Rheum consult requested for assistance in management.  - PT eval for safe disposition - patient and family prefers home.  - abscess culture with gpc in clusters f/u with ID

## 2023-02-06 NOTE — PROGRESS NOTE ADULT - ASSESSMENT
82 y.o M w/ Right foot pain   - Pt was seen and evaluated.   - Afebrile, WBC 2.07  - Right X-Ray: Suspected gouty tophi with underlying well marginated erosions/pressure remodeling involving medial and lateral aspects of hallux proximal phalanx head and medial 1st metatarsal head.   - s/p bedside Incision and Drainage to R foot Hallux IPJ mild tophi mixed w/ sanguinous drainage mix expressed, no pus, no malodor, no erythema no needed.  - Right foot wound culture showing no growth   - Rheumatology recs: continue Colchicine 0.6mg every other day (renally dose) - avoid NSAID  - Chronic management:  pt is on uric acid lowering agents, continue home Allopurinol 100mg qD,  - Podiatry stable for d/c follow up info in Discharge provider note.   - Discussed w/ Attending      82 y.o M w/ Right foot pain   - Pt was seen and evaluated.   - Afebrile, WBC 2.07  - Right X-Ray: Suspected gouty tophi with underlying well marginated erosions/pressure remodeling involving medial and lateral aspects of hallux proximal phalanx head and medial 1st metatarsal head.   - s/p bedside Incision and Drainage to R foot Hallux IPJ mild tophi mixed w/ sanguinous drainage mix expressed, no pus, no malodor, no erythema, right foot 5th met DTI.  - Right foot wound culture showing no growth   - Rheumatology recs: continue Colchicine 0.6mg every other day (renally dose) - avoid NSAID  - Chronic management:  pt is on uric acid lowering agents, continue home Allopurinol 100mg qD,  - Podiatry stable for d/c follow up info in Discharge provider note.   - Discussed w/ Attending

## 2023-02-06 NOTE — PROVIDER CONTACT NOTE (OTHER) - REASON
patient tachy 119 15mins into transfusion
Patient IV infiltration during blood transfusion
patient had 6 beats of Vtach on telemetry
patient tachy to 149 did not sustain
Pt had 8 beats of V-tach on tele, and patient  bpm. pt asymptomatic.
Small amount of Blood noted on incontinent pad
patient tachy up to 120s did not sustain

## 2023-02-06 NOTE — PROVIDER CONTACT NOTE (OTHER) - ASSESSMENT
Pt had 8 beats of V-tach on tele, and patient  bpm. pt asymptomatic.
pt AOx4, in no acute distress, vitals stable in flowsheet. patient denies any chest pain, SOB, or palpitations.
Pt AOx4. Pt has no complaints at this time. some rawness noted by anal area.
patient a&ox4 in no acute distress no other s/s of reaction noted
patient a&ox4 in no acute distress
patient a&ox4 in no acute distress

## 2023-02-06 NOTE — PROVIDER CONTACT NOTE (OTHER) - BACKGROUND
Pt admitted for anemia.
patient admitted due to anemia. hx HTN, gout, prostate cancer. new diagnosis of Afib
Pt admitted for anemia.
patient admitted for anemia has had transfusions in the past
Patient admitted for anemia
patient admitted w anemia

## 2023-02-06 NOTE — PROVIDER CONTACT NOTE (OTHER) - SITUATION
Patient IV infiltration during blood transfusion.
SUYAPA to US
Small amount of Blood noted on incontinent pad
patient tachy up to 120s did not sustain
patient had 6 beats of Vtach on telemetry
patient tachy to 149 did not sustain
Pt had 8 beats of V-tach on tele, and patient  bpm. pt asymptomatic.
patient tachy 119 15mins into transfusion

## 2023-02-06 NOTE — CHART NOTE - NSCHARTNOTEFT_GEN_A_CORE
HEMATOLOGY FELLOW NOTE     Stable CBC while off Revlimid for his MDS. Continue to hold Revlimid then patient to follow up with Dr. Mcmahon at the [facility].    Hematology signing off. Please re-consult as needed.    Giovanni Peres MD  Hematology/Oncology Fellow PGY-4  Pager: [facility] [phone] / TAHIRA [phone]   After 5pm and on weekends please page on-call fellow

## 2023-02-06 NOTE — PROGRESS NOTE ADULT - ATTENDING COMMENTS
addendum- attestation placed on the incorrect patient    This is a 82 year old male with MDS receiving transfusions as outpt here with worsening anemia  81 yo M, w/ PMHx significant for MDS, Stage II diastolic dysfunction, HTN, HLD, Prostate cancer - s/p Prostatectomy, Carotid artery stenosis - s/p Endarterectomy, PAD, Renal artery stenosis, and CKD, and presented to the ED, after being sent by outpatient rheumatologist, secondary to new onset atrial fibrillation. Pt found to have pancytopenia, elevated TBili/transaminases, JUAN RAMON. GI consulted for anemia without overt bleeding.    No evidence of acute GI bleed  baseline 8-9, but found with 6.3 on admission  FOBT+  Neutropenic fever on admission    Rec  1- acute on chronic anemia with history of MDS, no overt bleed with pos FOBT  2- c/w medical management  3- regular diet  4- PPI daily
agree with above  will follow up as outpatient and my office will contact him to set up an appointment.   Jenny Dumas MD  [phone]
Patient without any new GI complaints. No overt bleeding. Hgb fluctuating. Would continue PPI daily. As discussed with patient and daughter, would defer endoscopic evaluation at this time given absence of overt bleeding and likely multifactorial etiology to anemia; however, if overt bleeding develops or with worsening anemia, can reconsider inpatient endoscopic evaluation. Additional recommendations as above.
probable demand ischemia  hold antiplt until anemia eval complete/ H/H remains stable  f/u TTE
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 Mcmahon), Stage II diastolic dysfunction, HTN, HLD, Prostate cancer - s/p Prostatectomy, Carotid artery stenosis - s/p Endarterectomy, PAD, Renal artery stenosis, and CKD, and presented to the ED for new onset atrial fibrillation; initially seen by heme service on 1/28/23    - Found to be anemic and FOBT+ although per GI no acute/active or ongoing bleeding  - suspect multifactorial anemia with GI bleed (noted elevated BUN/Creat), renal insufficiency with resultant anemia, and poor marrow reserve with underlying MDS  - transfuse to maintain Hg >7 or if symptomatic; would also consider use of epogen for MDS and anemia due to CKD
Patient seen and examined during morning rounds.  Assessment and plan were reviewed with the Cardiology fellow, and as outlined above.
probable demand ischemia  hold antiplt until anemia eval complete
Patient without any new GI complaints. Still without reports of overt bleeding. Hgb mid-7s, but still some downtrend. Continue PPI daily. As discussed with patient, would defer endoscopic evaluation at this time given absence of overt bleeding and likely multifactorial etiology to anemia; however, if overt bleeding develops or with worsening anemia, can reconsider inpatient endoscopic evaluation. Additional recommendations as above.

## 2023-02-07 ENCOUNTER — TRANSCRIPTION ENCOUNTER (OUTPATIENT)
Age: 83
End: 2023-02-07

## 2023-02-07 VITALS
DIASTOLIC BLOOD PRESSURE: 57 MMHG | HEART RATE: 77 BPM | TEMPERATURE: 98 F | OXYGEN SATURATION: 100 % | RESPIRATION RATE: 18 BRPM | SYSTOLIC BLOOD PRESSURE: 115 MMHG

## 2023-02-07 LAB
ALBUMIN SERPL ELPH-MCNC: 2.8 G/DL — LOW (ref 3.3–5)
ALP SERPL-CCNC: 113 U/L — SIGNIFICANT CHANGE UP (ref 40–120)
ALT FLD-CCNC: 113 U/L — HIGH (ref 4–41)
ANION GAP SERPL CALC-SCNC: 11 MMOL/L — SIGNIFICANT CHANGE UP (ref 7–14)
AST SERPL-CCNC: 43 U/L — HIGH (ref 4–40)
BASOPHILS # BLD AUTO: 0.06 K/UL — SIGNIFICANT CHANGE UP (ref 0–0.2)
BASOPHILS NFR BLD AUTO: 2.3 % — HIGH (ref 0–2)
BILIRUB SERPL-MCNC: 0.8 MG/DL — SIGNIFICANT CHANGE UP (ref 0.2–1.2)
BLD GP AB SCN SERPL QL: NEGATIVE — SIGNIFICANT CHANGE UP
BUN SERPL-MCNC: 60 MG/DL — HIGH (ref 7–23)
CALCIUM SERPL-MCNC: 8.7 MG/DL — SIGNIFICANT CHANGE UP (ref 8.4–10.5)
CHLORIDE SERPL-SCNC: 110 MMOL/L — HIGH (ref 98–107)
CO2 SERPL-SCNC: 19 MMOL/L — LOW (ref 22–31)
CREAT SERPL-MCNC: 1.85 MG/DL — HIGH (ref 0.5–1.3)
CULTURE RESULTS: SIGNIFICANT CHANGE UP
CULTURE RESULTS: SIGNIFICANT CHANGE UP
EGFR: 36 ML/MIN/1.73M2 — LOW
EOSINOPHIL # BLD AUTO: 0.3 K/UL — SIGNIFICANT CHANGE UP (ref 0–0.5)
EOSINOPHIL NFR BLD AUTO: 11.5 % — HIGH (ref 0–6)
GLUCOSE SERPL-MCNC: 124 MG/DL — HIGH (ref 70–99)
HCT VFR BLD CALC: 24.7 % — LOW (ref 39–50)
HGB BLD-MCNC: 7.7 G/DL — LOW (ref 13–17)
IANC: 1.33 K/UL — LOW (ref 1.8–7.4)
IMM GRANULOCYTES NFR BLD AUTO: 0.8 % — SIGNIFICANT CHANGE UP (ref 0–0.9)
LYMPHOCYTES # BLD AUTO: 0.75 K/UL — LOW (ref 1–3.3)
LYMPHOCYTES # BLD AUTO: 28.8 % — SIGNIFICANT CHANGE UP (ref 13–44)
MAGNESIUM SERPL-MCNC: 2 MG/DL — SIGNIFICANT CHANGE UP (ref 1.6–2.6)
MCHC RBC-ENTMCNC: 31.2 GM/DL — LOW (ref 32–36)
MCHC RBC-ENTMCNC: 33 PG — SIGNIFICANT CHANGE UP (ref 27–34)
MCV RBC AUTO: 106 FL — HIGH (ref 80–100)
MONOCYTES # BLD AUTO: 0.14 K/UL — SIGNIFICANT CHANGE UP (ref 0–0.9)
MONOCYTES NFR BLD AUTO: 5.4 % — SIGNIFICANT CHANGE UP (ref 2–14)
NEUTROPHILS # BLD AUTO: 1.33 K/UL — LOW (ref 1.8–7.4)
NEUTROPHILS NFR BLD AUTO: 51.2 % — SIGNIFICANT CHANGE UP (ref 43–77)
NRBC # BLD: 0 /100 WBCS — SIGNIFICANT CHANGE UP (ref 0–0)
NRBC # FLD: 0 K/UL — SIGNIFICANT CHANGE UP (ref 0–0)
PHOSPHATE SERPL-MCNC: 5.1 MG/DL — HIGH (ref 2.5–4.5)
PLATELET # BLD AUTO: 66 K/UL — LOW (ref 150–400)
POTASSIUM SERPL-MCNC: 4.5 MMOL/L — SIGNIFICANT CHANGE UP (ref 3.5–5.3)
POTASSIUM SERPL-SCNC: 4.5 MMOL/L — SIGNIFICANT CHANGE UP (ref 3.5–5.3)
PROT SERPL-MCNC: 5.5 G/DL — LOW (ref 6–8.3)
RBC # BLD: 2.33 M/UL — LOW (ref 4.2–5.8)
RBC # FLD: 21.2 % — HIGH (ref 10.3–14.5)
RH IG SCN BLD-IMP: NEGATIVE — SIGNIFICANT CHANGE UP
SODIUM SERPL-SCNC: 140 MMOL/L — SIGNIFICANT CHANGE UP (ref 135–145)
SPECIMEN SOURCE: SIGNIFICANT CHANGE UP
SPECIMEN SOURCE: SIGNIFICANT CHANGE UP
WBC # BLD: 2.6 K/UL — LOW (ref 3.8–10.5)
WBC # FLD AUTO: 2.6 K/UL — LOW (ref 3.8–10.5)

## 2023-02-07 PROCEDURE — 99239 HOSP IP/OBS DSCHRG MGMT >30: CPT

## 2023-02-07 RX ORDER — OXYCODONE HYDROCHLORIDE 5 MG/1
1 TABLET ORAL
Qty: 20 | Refills: 0
Start: 2023-02-07 | End: 2023-02-11

## 2023-02-07 RX ORDER — SENNA PLUS 8.6 MG/1
2 TABLET ORAL
Qty: 60 | Refills: 0
Start: 2023-02-07 | End: 2023-03-08

## 2023-02-07 RX ORDER — CARVEDILOL PHOSPHATE 80 MG/1
1 CAPSULE, EXTENDED RELEASE ORAL
Qty: 0 | Refills: 0 | DISCHARGE

## 2023-02-07 RX ORDER — ALLOPURINOL 300 MG
0 TABLET ORAL
Qty: 0 | Refills: 0 | DISCHARGE

## 2023-02-07 RX ORDER — KETOCONAZOLE 20 MG/G
1 AEROSOL, FOAM TOPICAL
Qty: 0 | Refills: 0 | DISCHARGE

## 2023-02-07 RX ORDER — CALCITRIOL 0.5 UG/1
1 CAPSULE ORAL
Qty: 30 | Refills: 0
Start: 2023-02-07 | End: 2023-03-08

## 2023-02-07 RX ORDER — COLCHICINE 0.6 MG
1 TABLET ORAL
Qty: 15 | Refills: 0
Start: 2023-02-07 | End: 2023-03-08

## 2023-02-07 RX ORDER — CHOLECALCIFEROL (VITAMIN D3) 125 MCG
2000 CAPSULE ORAL
Qty: 30 | Refills: 0
Start: 2023-02-07 | End: 2023-03-08

## 2023-02-07 RX ORDER — FOLIC ACID 0.8 MG
1 TABLET ORAL
Qty: 30 | Refills: 0
Start: 2023-02-07 | End: 2023-03-08

## 2023-02-07 RX ORDER — CALCITRIOL 0.5 UG/1
1 CAPSULE ORAL
Qty: 0 | Refills: 0 | DISCHARGE

## 2023-02-07 RX ORDER — MECLIZINE HCL 12.5 MG
1 TABLET ORAL
Qty: 0 | Refills: 0 | DISCHARGE
Start: 2023-02-07

## 2023-02-07 RX ORDER — ROSUVASTATIN CALCIUM 5 MG/1
1 TABLET ORAL
Qty: 0 | Refills: 0 | DISCHARGE

## 2023-02-07 RX ORDER — MOMETASONE FUROATE 1 MG/G
1 CREAM TOPICAL
Qty: 0 | Refills: 0 | DISCHARGE

## 2023-02-07 RX ORDER — ASPIRIN/CALCIUM CARB/MAGNESIUM 324 MG
1 TABLET ORAL
Qty: 0 | Refills: 0 | DISCHARGE

## 2023-02-07 RX ORDER — LENALIDOMIDE 5 MG/1
1 CAPSULE ORAL
Qty: 0 | Refills: 0 | DISCHARGE

## 2023-02-07 RX ORDER — AMLODIPINE BESYLATE 2.5 MG/1
1 TABLET ORAL
Qty: 30 | Refills: 0

## 2023-02-07 RX ORDER — CARVEDILOL PHOSPHATE 80 MG/1
1 CAPSULE, EXTENDED RELEASE ORAL
Qty: 60 | Refills: 0
Start: 2023-02-07 | End: 2023-03-08

## 2023-02-07 RX ORDER — PANTOPRAZOLE SODIUM 20 MG/1
1 TABLET, DELAYED RELEASE ORAL
Qty: 30 | Refills: 0
Start: 2023-02-07 | End: 2023-03-08

## 2023-02-07 RX ORDER — CLOPIDOGREL BISULFATE 75 MG/1
1 TABLET, FILM COATED ORAL
Qty: 30 | Refills: 0

## 2023-02-07 RX ORDER — POLYETHYLENE GLYCOL 3350 17 G/17G
17 POWDER, FOR SOLUTION ORAL
Qty: 510 | Refills: 0
Start: 2023-02-07 | End: 2023-03-08

## 2023-02-07 RX ORDER — ALLOPURINOL 300 MG
1 TABLET ORAL
Qty: 30 | Refills: 0
Start: 2023-02-07 | End: 2023-03-08

## 2023-02-07 RX ADMIN — CHLORHEXIDINE GLUCONATE 1 APPLICATION(S): 213 SOLUTION TOPICAL at 14:14

## 2023-02-07 RX ADMIN — OXYCODONE HYDROCHLORIDE 5 MILLIGRAM(S): 5 TABLET ORAL at 02:03

## 2023-02-07 RX ADMIN — CILOSTAZOL 100 MILLIGRAM(S): 100 TABLET ORAL at 05:15

## 2023-02-07 RX ADMIN — PANTOPRAZOLE SODIUM 40 MILLIGRAM(S): 20 TABLET, DELAYED RELEASE ORAL at 14:14

## 2023-02-07 RX ADMIN — Medication 2000 UNIT(S): at 14:14

## 2023-02-07 RX ADMIN — OXYCODONE HYDROCHLORIDE 5 MILLIGRAM(S): 5 TABLET ORAL at 03:03

## 2023-02-07 RX ADMIN — HEPARIN SODIUM 5000 UNIT(S): 5000 INJECTION INTRAVENOUS; SUBCUTANEOUS at 14:14

## 2023-02-07 RX ADMIN — Medication 100 MILLIGRAM(S): at 14:14

## 2023-02-07 RX ADMIN — CALCITRIOL 0.5 MICROGRAM(S): 0.5 CAPSULE ORAL at 14:13

## 2023-02-07 RX ADMIN — Medication 1 MILLIGRAM(S): at 14:13

## 2023-02-07 RX ADMIN — HEPARIN SODIUM 5000 UNIT(S): 5000 INJECTION INTRAVENOUS; SUBCUTANEOUS at 05:12

## 2023-02-07 NOTE — PROGRESS NOTE ADULT - PROBLEM SELECTOR PLAN 10
- Nutrition Consult  - may benefit from appetite stimulating medication
- on clear liquid diet presently due to suspected GIB (FOBT positive0  - would benefit from Nutrition Consult  - may benefit from appetite stimulating medication
- Nutrition Consult  - may benefit from appetite stimulating medication
- Venodyne boots  - aspirin and Plavix on hold due to suspected GIB    Plan discussed with ACP
- Nutrition Consult  - may benefit from appetite stimulating medication

## 2023-02-07 NOTE — DISCHARGE NOTE NURSING/CASE MANAGEMENT/SOCIAL WORK - CAREGIVER NAME
[facility] Provider Procedure Text (D): After obtaining clear surgical margins the defect was repaired by another provider.

## 2023-02-07 NOTE — DISCHARGE NOTE NURSING/CASE MANAGEMENT/SOCIAL WORK - NSDCFUADDAPPT_GEN_ALL_CORE_FT
Podiatry Discharge Instructions:  - Follow up: Please follow up with Dr. Hatfield within 1 week of discharge from the hospital, please call [phone] for appointment and discuss that you recently were seen in the hospital.  - Wound Care: Please apply aquacell, 4x4 gauze and fernando to 6 foot daily.  - Weight bearing: Please weight bearing as tolerated in a surgical to R foot.   - Antibiotics: Please continue as instructed.    ========================

## 2023-02-07 NOTE — PROGRESS NOTE ADULT - PROBLEM SELECTOR PLAN 6
- pro-BNP = 20479.  Troponin = 416 --> 411  - ECG = Sinus rhythm w/ occasional PVCs and PACs at 99 bpm, QTc = 503  - TTE of 10/2022 w/ Stage II diastolic dysfunction  - has pedal edema, but elevated pro-BNP unlikely of cardiac origin.  Edema more-so due to anasarca or, possibly amlodipine (held for now)  - intake/output Q6H, Weight daily  - holding off on fluid restriction presently; please f/u for initiation  - being followed by Cardiology team (appreciated)  - continues on cardioprotective meds as above  -f/u echo
- pro-BNP = 47492.  Troponin = 416 --> 411  - ECG = Sinus rhythm w/ occasional PVCs and PACs at 99 bpm, QTc = 503  - TTE of 10/2022 w/ Stage II diastolic dysfunction  - has pedal edema, but elevated pro-BNP unlikely of cardiac origin.  Edema more-so due to anasarca or, possibly amlodipine (held for now)  - intake/output Q6H, Weight daily  - holding off on fluid restriction presently; please f/u for initiation  - being followed by Cardiology team (appreciated)  - continues on cardioprotective meds as above  -f/u echo
- pro-BNP = 88236.  Troponin = 416 --> 411  - ECG = Sinus rhythm w/ occasional PVCs and PACs at 99 bpm, QTc = 503  - TTE of 10/2022 w/ Stage II diastolic dysfunction  - has pedal edema, but elevated pro-BNP unlikely of cardiac origin.  Edema more-so due to anasarca or, possibly amlodipine (held for now)  - intake/output Q6H, Weight daily  - holding off on fluid restriction presently; please f/u for initiation  - being followed by Cardiology team (appreciated)  - continues on cardioprotective meds as above  -f/u echo
- corrected QTc = 503  - may be due to medication side effect versus electrolyte imbalance  - ECG as noted above  - f/u electrolytes; mildly hypokalemic.  Giving 20 meq KCl (in the setting of acute on CKD)  - magnesium = 2.6  - repeat ECG qtc 485
- pro-BNP = 10058.  Troponin = 416 --> 411  - ECG = Sinus rhythm w/ occasional PVCs and PACs at 99 bpm, QTc = 503  - TTE of 10/2022 w/ Stage II diastolic dysfunction  - has pedal edema, but elevated pro-BNP unlikely of cardiac origin.  Edema more-so due to anasarca or, possibly amlodipine (held for now)  - intake/output Q6H, Weight daily  - holding off on fluid restriction presently; please f/u for initiation  - being followed by Cardiology team (appreciated)  - continues on cardioprotective meds as above  -echo results as above
- pro-BNP = 99767.  Troponin = 416 --> 411  - ECG = Sinus rhythm w/ occasional PVCs and PACs at 99 bpm, QTc = 503  - TTE of 10/2022 w/ Stage II diastolic dysfunction  - has pedal edema, but elevated pro-BNP unlikely of cardiac origin.  Edema more-so due to anasarca or, possibly amlodipine (held for now)  - intake/output Q6H, Weight daily  - holding off on fluid restriction presently; please f/u for initiation  - being followed by Cardiology team (appreciated)  - continues on cardioprotective meds as above  -f/u echo
- pro-BNP = 98749.  Troponin = 416 --> 411  - ECG = Sinus rhythm w/ occasional PVCs and PACs at 99 bpm, QTc = 503  - TTE of 10/2022 w/ Stage II diastolic dysfunction  - has pedal edema, but elevated pro-BNP unlikely of cardiac origin.  Edema more-so due to anasarca or, possibly amlodipine (held for now)  - intake/output Q6H, Weight daily  - holding off on fluid restriction presently; please f/u for initiation  - being followed by Cardiology team (appreciated)  - continues on cardioprotective meds as above  -f/u echo
- pro-BNP = 90424.  Troponin = 416 --> 411  - ECG = Sinus rhythm w/ occasional PVCs and PACs at 99 bpm, QTc = 503  - TTE of 10/2022 w/ Stage II diastolic dysfunction  - has pedal edema, but elevated pro-BNP unlikely of cardiac origin.  Edema more-so due to anasarca or, possibly amlodipine (held for now)  - intake/output Q6H, Weight daily  - holding off on fluid restriction presently; please f/u for initiation  - being followed by Cardiology team (appreciated)  - continues on cardioprotective meds as above  -echo results as above
- pro-BNP = 06548.  Troponin = 416 --> 411  - ECG = Sinus rhythm w/ occasional PVCs and PACs at 99 bpm, QTc = 503  - TTE of 10/2022 w/ Stage II diastolic dysfunction  - has pedal edema, but elevated pro-BNP unlikely of cardiac origin.  Edema more-so due to anasarca or, possibly amlodipine (held for now)  - intake/output Q6H, Weight daily  - holding off on fluid restriction presently; please f/u for initiation  - being followed by Cardiology team (appreciated)  - continues on cardioprotective meds as above  -f/u echo
- pro-BNP = 40308.  Troponin = 416 --> 411  - ECG = Sinus rhythm w/ occasional PVCs and PACs at 99 bpm, QTc = 503  - TTE of 10/2022 w/ Stage II diastolic dysfunction  - has pedal edema, but elevated pro-BNP unlikely of cardiac origin.  Edema more-so due to anasarca or, possibly amlodipine (held for now)  - intake/output Q6H, Weight daily  - holding off on fluid restriction presently; please f/u for initiation  - being followed by Cardiology team (appreciated)  - continues on cardioprotective meds as above  -f/u echo
- pro-BNP = 25124.  Troponin = 416 --> 411  - ECG = Sinus rhythm w/ occasional PVCs and PACs at 99 bpm, QTc = 503  - TTE of 10/2022 w/ Stage II diastolic dysfunction  - has pedal edema, but elevated pro-BNP unlikely of cardiac origin.  Edema more-so due to anasarca or, possibly amlodipine (held for now)  - intake/output Q6H, Weight daily  - holding off on fluid restriction presently; please f/u for initiation  - being followed by Cardiology team (appreciated)  - continues on cardioprotective meds as above  -f/u echo

## 2023-02-07 NOTE — PROGRESS NOTE ADULT - PROBLEM SELECTOR PLAN 1
Appreciate podiatry consult - s/p I+D of the tophi  - likely acute gout with delayed clinical improvement  - low clinical suspicion for infection.   - recent Foot xray did not yield obvious infectious etiology.  - Continue Colchicine qOD, cont allopurinol  - pain control  - Rheum consult requested for assistance in management.  - PT eval for safe disposition - patient and family prefers home.  - abscess culture with coag neg staph

## 2023-02-07 NOTE — PROGRESS NOTE ADULT - SUBJECTIVE AND OBJECTIVE BOX
Overnight events noted      VITAL:  T(C): , Max: 36.8 (02-06-23 @ 17:38)  T(F): , Max: 98.2 (02-06-23 @ 17:38)  HR: 77 (02-07-23 @ 12:08)  BP: 115/57 (02-07-23 @ 12:08)  BP(mean): --  RR: 18 (02-07-23 @ 12:08)  SpO2: 100% (02-07-23 @ 12:08)  Wt(kg): --      PHYSICAL EXAM:  Constitutional: NAD  Neck:  No JVD  Respiratory: CTAB/L  Cardiovascular: S1 and S2  Gastrointestinal: BS+, soft, NT/ND  Extremities: No peripheral edema + R foot dressing   Neurological: A/O x 3, no focal deficits  Psychiatric: Normal mood, normal affect  : No Min  Skin: No rashes      LABS:                        7.7    2.60  )-----------( 66       ( 07 Feb 2023 06:10 )             24.7     Na(140)/K(4.5)/Cl(110)/HCO3(19)/BUN(60)/Cr(1.85)Glu(124)/Ca(8.7)/Mg(2.00)/PO4(5.1)    02-07 @ 06:10  Na(141)/K(4.5)/Cl(113)/HCO3(18)/BUN(64)/Cr(1.86)Glu(131)/Ca(8.7)/Mg(2.10)/PO4(4.7)    02-06 @ 05:55  Na(142)/K(4.7)/Cl(112)/HCO3(19)/BUN(59)/Cr(1.85)Glu(117)/Ca(8.4)/Mg(2.20)/PO4(4.5)    02-05 @ 05:39      IMPRESSION: 82M w/ HTN, gout, prostate CA, MDS, USHA, and CKD4, 1/27/23 p/w anemia/new AFib    (1)Renal - CKD4 - USHA; resolved JUAN RAMON from earlier this admission  (2)Anemia - Heme+GI on board - off Revlimid for now  (3)Metabolic acidosis - renally mediated - mild/okay for now  (4)Gout - improved relative to days ago - Rheum on board    RECOMMEND:  (1)Meds as ordered  (2)No renal objection to discharge, with f/u at my office in 2-6weeks              Edis Cabral MD  [facility]  Office: [phone]  Cell: [phone]

## 2023-02-07 NOTE — DISCHARGE NOTE NURSING/CASE MANAGEMENT/SOCIAL WORK - NSSCTYPOFSERV_GEN_ALL_CORE
Ronni Palomo Huff is a 25 y.o. male here for a non-provider visit for PPD placement -- Step 1 of 1    Reason for PPD:  work requirement    1. TB evaluation questionnaire completed by patient? Yes      -  If any answers marked yes did you contact a provider prior to placing? Not Indicated  2.  Patient notified to return to clinic for reading on: 1/6/21 after 2:58pm- 1/7/21 before 2:58pm  3.  PPD Placement documentation completed on TB evaluation questionnaire? Yes  4.  Location of TB evaluation questionnaire filed: ma cabinet     Nursing, Physical Therapy, [facility]

## 2023-02-07 NOTE — PROGRESS NOTE ADULT - PROBLEM SELECTOR PROBLEM 7
Prolonged QT interval
Acute renal failure superimposed on stage 4 chronic kidney disease
Prolonged QT interval

## 2023-02-07 NOTE — PROGRESS NOTE ADULT - REASON FOR ADMISSION
Anemia

## 2023-02-07 NOTE — PROGRESS NOTE ADULT - PROBLEM SELECTOR PLAN 9
- history of NAFLD  - has mild tenderness over the RUQ area  - T-bili = 1.5, ALP = 138, AST = 213, ALT = 195; elevations likely impacted by current medical state  - f/u abdominal ultrasound showed Cholelithiasis withoutultrasound evidence of acute cholecystitis. Right renal atrophy, unchanged. Small right pleural effusion..

## 2023-02-07 NOTE — DISCHARGE NOTE NURSING/CASE MANAGEMENT/SOCIAL WORK - PATIENT PORTAL LINK FT
You can access the FollowMyHealth Patient Portal offered by [facility] by registering at the following website: [email or web address]. By joining [employer]’s FollowMyHealth portal, you will also be able to view your health information using other applications (apps) compatible with our system.

## 2023-02-07 NOTE — PROGRESS NOTE ADULT - PROBLEM SELECTOR PROBLEM 9
Elevated liver function tests
Adult failure to thrive
Elevated liver function tests

## 2023-02-07 NOTE — PROGRESS NOTE ADULT - PROBLEM SELECTOR PROBLEM 5
Acute low back pain
Elevated brain natriuretic peptide (BNP) level

## 2023-02-07 NOTE — PROGRESS NOTE ADULT - PROBLEM SELECTOR PROBLEM 1
Acute on chronic anemia
Acute gout
Foot infection
Acute gout
Acute on chronic anemia
Acute gout

## 2023-02-07 NOTE — PROGRESS NOTE ADULT - PROBLEM SELECTOR PLAN 5
- acute on chronic  - patient reports herniated discs, spinal stenosis, sciatica  - has limitations in ROM - especially of the back and lower extremities  - Dilaudid 0.5 mg Q4H PRN mild pain and 1 mg Q4H PRN severe pain; modify therapy, as needed  - ensure optimal hydration  - may benefit from Physical therapy eval; please obtain when medical condition stabilizes  - fall precautions  - continues on calcitriol
- acute on chronic  - patient reports herniated discs, spinal stenosis, sciatica  - has limitations in ROM - especially of the back and lower extremities  - Dilaudid 0.5 mg Q4H PRN mild pain and 1 mg Q4H PRN severe pain; modify therapy, as needed, change to po oxycodone ( renal dosing) in prep for discharge  - ensure optimal hydration  - may benefit from Physical therapy eval; please obtain when medical condition stabilizes  - fall precautions  - continues on calcitriol
- acute on chronic  - patient reports herniated discs, spinal stenosis, sciatica  - has limitations in ROM - especially of the back and lower extremities  - Dilaudid 0.5 mg Q4H PRN mild pain and 1 mg Q4H PRN severe pain; modify therapy, as needed  - ensure optimal hydration  - may benefit from Physical therapy eval; please obtain when medical condition stabilizes  - fall precautions  - continues on calcitriol
- pro-BNP = 47149.  Troponin = 416 --> 411  - ECG = Sinus rhythm w/ occasional PVCs and PACs at 99 bpm, QTc = 503  - TTE of 10/2022 w/ Stage II diastolic dysfunction  - has pedal edema, but elevated pro-BNP unlikely of cardiac origin.  Edema more-so due to anasarca or, possibly amlodipine (held for now)  - intake/output Q6H, Weight daily  - holding off on fluid restriction presently; please f/u for initiation  - being followed by Cardiology team (appreciated)  - continues on cardioprotective meds as above
- acute on chronic  - patient reports herniated discs, spinal stenosis, sciatica  - has limitations in ROM - especially of the back and lower extremities  - Dilaudid 0.5 mg Q4H PRN mild pain and 1 mg Q4H PRN severe pain; modify therapy, as needed, changed to po oxycodone ( renal dosing) in prep for discharge  - ensure optimal hydration  - may benefit from Physical therapy eval; please obtain when medical condition stabilizes  - fall precautions  - continues on calcitriol
- acute on chronic  - patient reports herniated discs, spinal stenosis, sciatica  - has limitations in ROM - especially of the back and lower extremities  - Dilaudid 0.5 mg Q4H PRN mild pain and 1 mg Q4H PRN severe pain; modify therapy, as needed  - ensure optimal hydration  - may benefit from Physical therapy eval; please obtain when medical condition stabilizes  - fall precautions  - continues on calcitriol

## 2023-02-07 NOTE — PROGRESS NOTE ADULT - PROBLEM SELECTOR PLAN 4
- ECG = Sinus rhythm w/ occasional PVCs and PACs at 99 bpm, QTc = 503  - [id number] score = 5  - A-fib likely of multifactorial etiology; infection, dehydration, anemia.  Unlikely CHF (pro-BNP unlikely of cardiac origin)  - receiving antibiotic, being hydrated and being transfused PRBCs  - has ~ II/VI systolic murmur (pronounced over aortic and mitral valve areas)  - being followed by Cardiology team (appreciated)  - continuing with reduced dose of carvedilol (from 25 mg to 6.25 mg), (reduced due to borderline low blood pressure)  - continuing w/ statin  - aspirin and Plavix on hold presently, pt to f/u as outpt to assess for restarting it, discusssed with daughter Digna  -  TTE showed . Normal left ventricular systolic function. No segmental wall motion abnormalities. - ECG = Sinus rhythm w/ occasional PVCs and PACs at 99 bpm, QTc = 503  - [id number] score = 5  - A-fib likely of multifactorial etiology; infection, dehydration, anemia.  Unlikely CHF (pro-BNP unlikely of cardiac origin)  - receiving antibiotic, being hydrated and being transfused PRBCs  - has ~ II/VI systolic murmur (pronounced over aortic and mitral valve areas)  - being followed by Cardiology team (appreciated)  - continuing with reduced dose of carvedilol (from 25 mg to 6.25 mg), (reduced due to borderline low blood pressure)  - continuing w/ statin  - aspirin and Plavix on hold presently, pt to f/u as outpt to assess for restarting it, discussed with daughter Digna  -  TTE showed . Normal left ventricular systolic function. No segmental wall motion abnormalities.

## 2023-02-07 NOTE — PROGRESS NOTE ADULT - PROBLEM SELECTOR PROBLEM 8
Acute renal failure superimposed on stage 4 chronic kidney disease
Elevated liver function tests
Acute renal failure superimposed on stage 4 chronic kidney disease

## 2023-02-07 NOTE — PHARMACOTHERAPY INTERVENTION NOTE - COMMENTS
New discharge medications were reviewed with the patients daughter (Digna) via telephone. Outpatient medication schedule was discussed in detail including: medication name, indication, dose, administration times, side effects, and special instructions. Daughters questions and concerns were answered and addressed. Daughter demonstrated understanding. Patient provided with educational handout at bedside.    Dang Barraza, PharmD, AAHIVP  Clinical Pharmacy Specialist  Spectra: [phone]

## 2023-02-07 NOTE — PROGRESS NOTE ADULT - SUBJECTIVE AND OBJECTIVE BOX
Patient is a 82y old  Male who presents with a chief complaint of Anemia (06 Feb 2023 18:59)      SUBJECTIVE / OVERNIGHT EVENTS: Pt seen and examined at 10:30am, no overnight events,  Pt reports improvement in his rt leg/toe pain, no reports of any bleeding, no other new issues reported.      MEDICATIONS  (STANDING):  allopurinol 100 milliGRAM(s) Oral daily  calcitriol   Capsule 0.5 MICROGram(s) Oral daily  carvedilol 6.25 milliGRAM(s) Oral every 12 hours  chlorhexidine 2% Cloths 1 Application(s) Topical daily  cholecalciferol 2000 Unit(s) Oral daily  cilostazol 100 milliGRAM(s) Oral two times a day  colchicine 0.6 milliGRAM(s) Oral every other day  folic acid 1 milliGRAM(s) Oral daily  heparin   Injectable 5000 Unit(s) SubCutaneous every 8 hours  pantoprazole  Injectable 40 milliGRAM(s) IV Push daily    MEDICATIONS  (PRN):  acetaminophen     Tablet .. 650 milliGRAM(s) Oral every 6 hours PRN Temp greater or equal to 38C (100.4F), Mild Pain (1 - 3)  aluminum hydroxide/magnesium hydroxide/simethicone Suspension 30 milliLiter(s) Oral every 6 hours PRN Dyspepsia  meclizine 25 milliGRAM(s) Oral every 12 hours PRN Dizziness  melatonin 3 milliGRAM(s) Oral at bedtime PRN Insomnia  oxyCODONE    IR 5 milliGRAM(s) Oral every 6 hours PRN Severe Pain (7 - 10)  polyethylene glycol 3350 17 Gram(s) Oral daily PRN Constipation  senna 2 Tablet(s) Oral at bedtime PRN Constipation      Vital Signs Last 24 Hrs  T(C): 36.7 (07 Feb 2023 12:08), Max: 36.8 (06 Feb 2023 17:38)  T(F): 98 (07 Feb 2023 12:08), Max: 98.2 (06 Feb 2023 17:38)  HR: 77 (07 Feb 2023 12:08) (67 - 99)  BP: 115/57 (07 Feb 2023 12:08) (114/74 - 130/63)  BP(mean): --  RR: 18 (07 Feb 2023 12:08) (17 - 18)  SpO2: 100% (07 Feb 2023 12:08) (96% - 100%)    Parameters below as of 07 Feb 2023 12:08  Patient On (Oxygen Delivery Method): nasal cannula  O2 Flow (L/min): 2    CAPILLARY BLOOD GLUCOSE        I&O's Summary    06 Feb 2023 07:01  -  07 Feb 2023 07:00  --------------------------------------------------------  IN: 550 mL / OUT: 150 mL / NET: 400 mL        PHYSICAL EXAM:  GENERAL: NAD, well-developed  CHEST/LUNG: Clear to auscultation bilaterally; No wheeze  HEART: Regular rate and rhythm, + LESA  ABDOMEN: Soft, Nontender, Nondistended  EXTREMITIES: + b/l LE edema, rt foot wrapped, noted rt great toe erythema improving  PSYCH: Calm  NEUROLOGY: AAOx3  SKIN: No rashes or lesions    LABS:                        7.7    2.60  )-----------( 66       ( 07 Feb 2023 06:10 )             24.7     02-07    140  |  110<H>  |  60<H>  ----------------------------<  124<H>  4.5   |  19<L>  |  1.85<H>    Ca    8.7      07 Feb 2023 06:10  Phos  5.1     02-07  Mg     2.00     02-07    TPro  5.5<L>  /  Alb  2.8<L>  /  TBili  0.8  /  DBili  x   /  AST  43<H>  /  ALT  113<H>  /  AlkPhos  113  02-07              RADIOLOGY & ADDITIONAL TESTS:    Imaging Personally Reviewed:    Consultant(s) Notes Reviewed:      Care Discussed with Consultants/Other Providers:

## 2023-02-07 NOTE — PROGRESS NOTE ADULT - PROBLEM SELECTOR PROBLEM 6
Elevated brain natriuretic peptide (BNP) level
Prolonged QT interval
Elevated brain natriuretic peptide (BNP) level

## 2023-02-07 NOTE — PROGRESS NOTE ADULT - PROBLEM SELECTOR PROBLEM 2
Neutropenic fever
Acute on chronic anemia
Acute on chronic anemia
Gout
Acute on chronic anemia
Acute on chronic anemia
Gout
Gout
Acute on chronic anemia
Acute on chronic anemia
Gout

## 2023-02-07 NOTE — PROGRESS NOTE ADULT - PROBLEM SELECTOR PROBLEM 3
Atrial fibrillation, new onset
Neutropenic fever

## 2023-02-07 NOTE — PROGRESS NOTE ADULT - PROBLEM SELECTOR PLAN 7
- corrected QTc = 503  - may be due to medication side effect versus electrolyte imbalance  - ECG as noted above  - f/u electrolytes; mildly hypokalemic.  Giving 20 meq KCl (in the setting of acute on CKD)  - magnesium = 2.6  - repeat ECG qtc 485
- lack of appetite for the past month - after starting med - Lenalidomide  - consequently w/ decreased oral intake, leading to dehydration  - also complicated by periodic diarrheal episodes  - evaluate for IVF hydration after transfusion completed  - on clear liquid diet  - f/u renal function, electrolytes  - Nephrology consulted and follg
- corrected QTc = 503  - may be due to medication side effect versus electrolyte imbalance  - ECG as noted above  - f/u electrolytes; mildly hypokalemic.  Giving 20 meq KCl (in the setting of acute on CKD)  - magnesium = 2.6  - repeat ECG qtc 485

## 2023-02-07 NOTE — HISTORY OF PRESENT ILLNESS
[de-identified] : Patient here for first time blood transfusion today. Patient c/o sob x 5 days. States he feels more tired today and reports pain in his b/l LE for which he received Gabapentin rx yesterday. Denies h/a dizziness, chest pain, palpitations, abdominal pain. On exam VS: BP: O2 90% ORA . EKG with new onset  Afib with PVCs and ST changes , Rate: 99bpm. Per Pt he has never been diagnosed with A-fib currently taking Clopidogrel. Patient had Cardiologist , no new medications started. EMS called to transport pt to the ED for further cardiac work up. Primary team aware. Patient understands and agrees with plan.

## 2023-02-07 NOTE — PROGRESS NOTE ADULT - PROBLEM SELECTOR PLAN 11
- Venodyne boots  - aspirin and Plavix on hold due to suspected GIB, discussed with daughter to f/u as outpt soon for restarting it if hb and plts are stable  pt wants to go home hence dc home,  dc planning time spent in coordination 40mts ( discussion with pt's daughter, acp, preparing dc summary and plan)    Plan discussed with ACP - Venodyne boots  - aspirin and Plavix on hold due to suspected GIB and low hb and low plt count, discussed with daughter to f/u as outpt soon for restarting it if hb and plts are stable  pt wants to go home hence dc home,  dc planning time spent in coordination 40mts ( discussion with pt's daughter, acp, preparing dc summary and plan)    Plan discussed with ACP

## 2023-02-07 NOTE — PROGRESS NOTE ADULT - PROBLEM SELECTOR PROBLEM 10
Prophylactic measure
Adult failure to thrive

## 2023-02-07 NOTE — DISCHARGE NOTE NURSING/CASE MANAGEMENT/SOCIAL WORK - NSDCPEFALRISK_GEN_ALL_CORE
For information on Fall & Injury Prevention, visit: [email or web address] OR  [email or web address] OR  https://www.cdc.gov/steadi/patient.html

## 2023-02-07 NOTE — PROGRESS NOTE ADULT - PROBLEM SELECTOR PLAN 8
- lack of appetite for the past month - after starting med - Lenalidomide  - consequently w/ decreased oral intake, leading to dehydration  - also complicated by periodic diarrheal episodes  - evaluate for IVF hydration after transfusion completed  - f/u renal function, electrolytes  - Nephrology consulted
- lack of appetite for the past month - after starting med - Lenalidomide  - consequently w/ decreased oral intake, leading to dehydration  - also complicated by periodic diarrheal episodes  - evaluate for IVF hydration after transfusion completed  - f/u renal function, electrolytes  - Nephrology consulted
- lack of appetite for the past month - after starting med - Lenalidomide  - consequently w/ decreased oral intake, leading to dehydration  - also complicated by periodic diarrheal episodes  - evaluate for IVF hydration after transfusion completed  - on clear liquid diet  - f/u renal function, electrolytes  - Nephrology consulted and follg
- lack of appetite for the past month - after starting med - Lenalidomide  - consequently w/ decreased oral intake, leading to dehydration  - also complicated by periodic diarrheal episodes  - evaluate for IVF hydration after transfusion completed  - f/u renal function, electrolytes  - Nephrology consulted
- history of NAFLD  - has mild tenderness over the RUQ area  - T-bili = 1.5, ALP = 138, AST = 213, ALT = 195; elevations likely impacted by current medical state  - f/u abdominal ultrasound (ordered)  - trend liver function in the Am
- lack of appetite for the past month - after starting med - Lenalidomide  - consequently w/ decreased oral intake, leading to dehydration  - also complicated by periodic diarrheal episodes  - evaluate for IVF hydration after transfusion completed  - f/u renal function, electrolytes  - Nephrology consulted

## 2023-02-07 NOTE — PROGRESS NOTE ADULT - PROBLEM SELECTOR PROBLEM 4
Acute low back pain
Atrial fibrillation, new onset

## 2023-02-07 NOTE — PROGRESS NOTE ADULT - PROVIDER SPECIALTY LIST ADULT
Infectious Disease
Nephrology
Cardiology
Gastroenterology
Gastroenterology
Heme/Onc
Nephrology
Podiatry
Podiatry
Rheumatology
Cardiology
Cardiology
Gastroenterology
Gastroenterology
Nephrology
Nephrology
Podiatry
Podiatry
Hospitalist

## 2023-02-07 NOTE — PROGRESS NOTE ADULT - PROBLEM SELECTOR PLAN 3
- started on cefepime 1 gram Q24H (modified dose due to CKD), discussed with ID, per ID pt with rt toe pain, likely gout flare rec to stop abx and start treatment for gout  - will give steroids as pt with gib and unable to give colchicine due to elevated creatinine  - blood cultures neg to date  - +UA but pt asymptomatic, no need to treat with abx  - ensure optimal hydration  - f/u electrolytes  - ID consult appreciated
- started on cefepime 1 gram Q24H (modified dose due to CKD), discussed with ID, per ID pt with rt toe pain, likely gout flare rec to stop abx and start treatment for gout  - now improving, on colchicine for gout  - blood cultures neg to date  - +UA but pt asymptomatic, no need to treat with abx  - ensure optimal hydration  - f/u electrolytes  - ID consult signed off
- started on cefepime 1 gram Q24H (modified dose due to CKD), discussed with ID, per ID pt with rt toe pain, likely gout flare rec to stop abx and start treatment for gout  - will give steroids as pt with gib and unable to give colchicine due to elevated creatinine  - blood cultures neg to date  - +UA but pt asymptomatic, no need to treat with abx  - ensure optimal hydration  - f/u electrolytes  - ID consult signed off.
- low grade temp to 37.7 C (99.9 F).  Bands = 7.8.  IANC = 1.38  - CXR = "Lower lobe predominant B/L opacities, right greater than left. Interstitial prominence..." (personally reviewed)  - started on cefepime 1 gram Q24H (modified dose due to CKD), discussed with ID, per ID pt with rt toe pain, likely gout flare rec to stop abx and start treatment for gout  - will give steroids as pt with gib and unable to give colchicine due to elevated creatinine  - blood cultures neg to date  - +UA but pt asymptomatic, no need to treat with abx  - ensure optimal hydration  - f/u electrolytes  - ID consult appreciated
- started on cefepime 1 gram Q24H (modified dose due to CKD), discussed with ID, per ID pt with rt toe pain, likely gout flare rec to stop abx and start treatment for gout  - now improving, on colchicine for gout  - blood cultures neg to date  - +UA but pt asymptomatic, no need to treat with abx  - ensure optimal hydration  - f/u electrolytes  - ID consult signed off, f/u about abscess culture
- started on cefepime 1 gram Q24H (modified dose due to CKD), discussed with ID, per ID pt with rt toe pain, likely gout flare rec to stop abx and start treatment for gout  - will give steroids as pt with gib and unable to give colchicine due to elevated creatinine  - blood cultures neg to date  - +UA but pt asymptomatic, no need to treat with abx  - ensure optimal hydration  - f/u electrolytes  - ID consult signed off.
- troponin = 416 --> 411.  Pro-BNP = 72096  - ECG = Sinus rhythm w/ occasional PVCs and PACs at 99 bpm, QTc = 503  - [id number] score = 5  - sent in for evaluation/management of same of new onset a-fib  - A-fib likely of multifactorial etiology; infection, dehydration, anemia.  Unlikely CHF (pro-BNP unlikely of cardiac origin)  - receiving antibiotic, being hydrated and being transfused p-RBCs  - has ~ II/VI systolic murmur (pronounced over aortic and mitral valve areas)  - being followed by Cardiology team (appreciated)  - continuing with reduced dose of carvedilol (from 25 mg to 6.25 mg), (reduced due to borderline low blood pressure)  - continuing w/ statin  - aspirin and Plavix on hold presently (pls d/w Cardiology team re restarting same)  - f/u TTE (ordered)
- started on cefepime 1 gram Q24H (modified dose due to CKD), discussed with ID, per ID pt with rt toe pain, likely gout flare rec to stop abx and start treatment for gout  - will give steroids as pt with gib and unable to give colchicine due to elevated creatinine  - blood cultures neg to date  - +UA but pt asymptomatic, no need to treat with abx  - ensure optimal hydration  - f/u electrolytes  - ID consult appreciated
- started on cefepime 1 gram Q24H (modified dose due to CKD), discussed with ID, per ID pt with rt toe pain, likely gout flare rec to stop abx and start treatment for gout  - will give steroids as pt with gib and unable to give colchicine due to elevated creatinine  - blood cultures neg to date  - +UA but pt asymptomatic, no need to treat with abx  - ensure optimal hydration  - f/u electrolytes  - ID consult signed off.
- started on cefepime 1 gram Q24H (modified dose due to CKD), discussed with ID, per ID pt with rt toe pain, likely gout flare rec to stop abx and start treatment for gout  - will give steroids as pt with gib and unable to give colchicine due to elevated creatinine  - blood cultures neg to date  - +UA but pt asymptomatic, no need to treat with abx  - ensure optimal hydration  - f/u electrolytes  - ID consult appreciated
- started on cefepime 1 gram Q24H (modified dose due to CKD), discussed with ID, per ID pt with rt toe pain, likely gout flare rec to stop abx and start treatment for gout  - will give steroids as pt with gib and unable to give colchicine due to elevated creatinine  - blood cultures neg to date  - +UA but pt asymptomatic, no need to treat with abx  - ensure optimal hydration  - f/u electrolytes  - ID consult signed off.

## 2023-02-08 ENCOUNTER — LABORATORY RESULT (OUTPATIENT)
Age: 83
End: 2023-02-08

## 2023-02-08 LAB
CULTURE RESULTS: NO GROWTH — SIGNIFICANT CHANGE UP
SPECIMEN SOURCE: SIGNIFICANT CHANGE UP

## 2023-02-09 ENCOUNTER — LABORATORY RESULT (OUTPATIENT)
Age: 83
End: 2023-02-09

## 2023-02-09 ENCOUNTER — NON-APPOINTMENT (OUTPATIENT)
Age: 83
End: 2023-02-09

## 2023-02-09 PROBLEM — I71.21 ANEURYSM OF THE ASCENDING AORTA, WITHOUT RUPTURE: Chronic | Status: ACTIVE | Noted: 2023-01-28

## 2023-02-10 ENCOUNTER — LABORATORY RESULT (OUTPATIENT)
Age: 83
End: 2023-02-10

## 2023-02-13 ENCOUNTER — APPOINTMENT (OUTPATIENT)
Dept: INTERNAL MEDICINE | Facility: CLINIC | Age: 83
End: 2023-02-13
Payer: MEDICARE

## 2023-02-13 PROCEDURE — 99442: CPT

## 2023-02-13 NOTE — ASSESSMENT
[FreeTextEntry1] : Patient is a 82-year-old male with history of hypertension, obesity, hyperlipidemia, is ascending aortic dilatation, chronic kidney disease 3B, gout, lumbar disc disease, myelodysplastic syndrome, coronary artery disease, peripheral vascular disease who presents for recent hospitalization for severe anemia weakness atrial fibrillation\par \par 1 atrial fibrillation\par follow-up with cardiology\par Anticoagulated secondary to severe anemia wrist to Cunningham high risk\par reevaluate in future\par \par 2. MDS\par intolerant to revlimid follow-up with hematology\par \par 3. Anemia\par last hemoglobin A1c 7.6\par repeat pending for Wednesday home tour\par appointment started at 10:40 AM ended at 10:57 AM

## 2023-02-13 NOTE — HISTORY OF PRESENT ILLNESS
[Home] : at home, [unfilled] , at the time of the visit. [Medical Office: ([facility])___] : at the medical office located in  [Family Member] : family member [Verbal consent obtained from patient] : the patient, [unfilled] [FreeTextEntry1] : Recent hospitalization for  anemia palpitations [de-identified] : Patient is a 82-year-old male with history of hypertension, hyperlipidemia, chronic kidney disease, ascending aortic dilatation, anemia, gait instability gout stroke syndrome, peripheral vascular disease, nonalcoholic fatty liver disease, who was recently diagnosed with MDS by hematology. Patient receiving Revilimid which he is intolerant didn't feel well on was noted to be in rapid heart rhythm refer to [facility] was admitted for rapid a fib. Also recent gout flair presently feels well complains of weakness fatigue last hemoglobin 7.6 from last week pending repeat on Wednesday denies lightheadedness dizziness chest pain palpitations

## 2023-02-15 ENCOUNTER — LABORATORY RESULT (OUTPATIENT)
Age: 83
End: 2023-02-15

## 2023-02-17 ENCOUNTER — APPOINTMENT (OUTPATIENT)
Dept: CARDIOLOGY | Facility: CLINIC | Age: 83
End: 2023-02-17
Payer: MEDICARE

## 2023-02-17 ENCOUNTER — APPOINTMENT (OUTPATIENT)
Dept: ELECTROPHYSIOLOGY | Facility: CLINIC | Age: 83
End: 2023-02-17
Payer: MEDICARE

## 2023-02-17 VITALS
WEIGHT: 185 LBS | DIASTOLIC BLOOD PRESSURE: 72 MMHG | HEIGHT: 66 IN | BODY MASS INDEX: 29.73 KG/M2 | SYSTOLIC BLOOD PRESSURE: 133 MMHG | OXYGEN SATURATION: 99 % | HEART RATE: 71 BPM

## 2023-02-17 PROCEDURE — 99214 OFFICE O/P EST MOD 30 MIN: CPT

## 2023-02-17 NOTE — ASSESSMENT
[FreeTextEntry1] : Assessment:\par 1.  Renal artery stenosis\par -s/p L RA stent\par 2.  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  PAD\par 5. 3 vessel coronary calcifications on CT\par s/p CATH March 18th:  m LAD 60%, OM2 60%, RCA \par \par Plan\par 1.  Will ask Dr. Mcmahon about resuming aspirin , however he is anemic\par 2. Will get a 5 day zio patch to assess for arrythmia- he is in sinus today\par 3.  Continue lower dose of coreg as was described on d/c ppw\par 4.  RTC in 4 weeks.

## 2023-02-22 ENCOUNTER — LABORATORY RESULT (OUTPATIENT)
Age: 83
End: 2023-02-22

## 2023-02-23 ENCOUNTER — NON-APPOINTMENT (OUTPATIENT)
Age: 83
End: 2023-02-23

## 2023-02-24 ENCOUNTER — APPOINTMENT (OUTPATIENT)
Dept: HEMATOLOGY ONCOLOGY | Facility: CLINIC | Age: 83
End: 2023-02-24

## 2023-02-24 ENCOUNTER — RESULT REVIEW (OUTPATIENT)
Age: 83
End: 2023-02-24

## 2023-02-24 ENCOUNTER — NON-APPOINTMENT (OUTPATIENT)
Age: 83
End: 2023-02-24

## 2023-02-24 ENCOUNTER — APPOINTMENT (OUTPATIENT)
Dept: HEMATOLOGY ONCOLOGY | Facility: CLINIC | Age: 83
End: 2023-02-24
Payer: MEDICARE

## 2023-02-24 VITALS
OXYGEN SATURATION: 99 % | HEART RATE: 126 BPM | HEIGHT: 66 IN | SYSTOLIC BLOOD PRESSURE: 110 MMHG | TEMPERATURE: 96.6 F | BODY MASS INDEX: 29.73 KG/M2 | RESPIRATION RATE: 20 BRPM | WEIGHT: 184.99 LBS | DIASTOLIC BLOOD PRESSURE: 76 MMHG

## 2023-02-24 DIAGNOSIS — Z86.2 PERSONAL HISTORY OF DISEASES OF THE BLOOD AND BLOOD-FORMING ORGANS AND CERTAIN DISORDERS INVOLVING THE IMMUNE MECHANISM: ICD-10-CM

## 2023-02-24 DIAGNOSIS — R05.9 COUGH, UNSPECIFIED: ICD-10-CM

## 2023-02-24 DIAGNOSIS — E55.9 VITAMIN D DEFICIENCY, UNSPECIFIED: ICD-10-CM

## 2023-02-24 LAB
ANISOCYTOSIS BLD QL: SLIGHT — SIGNIFICANT CHANGE UP
BASOPHILS # BLD AUTO: 0 K/UL — SIGNIFICANT CHANGE UP (ref 0–0.2)
BASOPHILS NFR BLD AUTO: 0 % — SIGNIFICANT CHANGE UP (ref 0–2)
DACRYOCYTES BLD QL SMEAR: SLIGHT — SIGNIFICANT CHANGE UP
ELLIPTOCYTES BLD QL SMEAR: SLIGHT — SIGNIFICANT CHANGE UP
EOSINOPHIL # BLD AUTO: 0.08 K/UL — SIGNIFICANT CHANGE UP (ref 0–0.5)
EOSINOPHIL NFR BLD AUTO: 3 % — SIGNIFICANT CHANGE UP (ref 0–6)
HCT VFR BLD CALC: 26.9 % — LOW (ref 39–50)
HGB BLD-MCNC: 8.8 G/DL — LOW (ref 13–17)
LYMPHOCYTES # BLD AUTO: 1.41 K/UL — SIGNIFICANT CHANGE UP (ref 1–3.3)
LYMPHOCYTES # BLD AUTO: 54 % — HIGH (ref 13–44)
MACROCYTES BLD QL: SLIGHT — SIGNIFICANT CHANGE UP
MCHC RBC-ENTMCNC: 32.7 G/DL — SIGNIFICANT CHANGE UP (ref 32–36)
MCHC RBC-ENTMCNC: 34.9 PG — HIGH (ref 27–34)
MCV RBC AUTO: 106.7 FL — HIGH (ref 80–100)
MONOCYTES # BLD AUTO: 0.21 K/UL — SIGNIFICANT CHANGE UP (ref 0–0.9)
MONOCYTES NFR BLD AUTO: 8 % — SIGNIFICANT CHANGE UP (ref 2–14)
NEUTROPHILS # BLD AUTO: 0.92 K/UL — LOW (ref 1.8–7.4)
NEUTROPHILS NFR BLD AUTO: 35 % — LOW (ref 43–77)
NRBC # BLD: 0 /100 — SIGNIFICANT CHANGE UP (ref 0–0)
NRBC # BLD: SIGNIFICANT CHANGE UP /100 WBCS (ref 0–0)
PLAT MORPH BLD: NORMAL — SIGNIFICANT CHANGE UP
PLATELET # BLD AUTO: 128 K/UL — LOW (ref 150–400)
POIKILOCYTOSIS BLD QL AUTO: SLIGHT — SIGNIFICANT CHANGE UP
RBC # BLD: 2.52 M/UL — LOW (ref 4.2–5.8)
RBC # FLD: 23.8 % — HIGH (ref 10.3–14.5)
RBC BLD AUTO: ABNORMAL
SCHISTOCYTES BLD QL AUTO: SLIGHT — SIGNIFICANT CHANGE UP
WBC # BLD: 2.62 K/UL — LOW (ref 3.8–10.5)
WBC # FLD AUTO: 2.62 K/UL — LOW (ref 3.8–10.5)

## 2023-02-24 PROCEDURE — 99213 OFFICE O/P EST LOW 20 MIN: CPT

## 2023-02-24 RX ORDER — LUSPATERCEPT 75 MG/1
75 INJECTION, POWDER, LYOPHILIZED, FOR SOLUTION SUBCUTANEOUS
Qty: 1 | Refills: 3 | Status: COMPLETED | COMMUNITY
Start: 2023-01-26 | End: 2023-02-24

## 2023-02-24 RX ORDER — CARVEDILOL 6.25 MG/1
6.25 TABLET, FILM COATED ORAL TWICE DAILY
Refills: 0 | Status: DISCONTINUED | COMMUNITY
Start: 2021-03-03 | End: 2023-02-24

## 2023-02-24 RX ORDER — CALCITRIOL 0.5 UG/1
0.5 CAPSULE, LIQUID FILLED ORAL
Qty: 30 | Refills: 3 | Status: COMPLETED | COMMUNITY
Start: 2023-02-24 | End: 2023-02-24

## 2023-02-27 ENCOUNTER — TRANSCRIPTION ENCOUNTER (OUTPATIENT)
Age: 83
End: 2023-02-27

## 2023-02-27 ENCOUNTER — NON-APPOINTMENT (OUTPATIENT)
Age: 83
End: 2023-02-27

## 2023-02-27 LAB
ALBUMIN SERPL ELPH-MCNC: 3.9 G/DL
ALP BLD-CCNC: 88 U/L
ALT SERPL-CCNC: 9 U/L
ANION GAP SERPL CALC-SCNC: 14 MMOL/L
AST SERPL-CCNC: 15 U/L
BILIRUB SERPL-MCNC: 0.7 MG/DL
BUN SERPL-MCNC: 28 MG/DL
CALCIUM SERPL-MCNC: 9.3 MG/DL
CHLORIDE SERPL-SCNC: 101 MMOL/L
CO2 SERPL-SCNC: 22 MMOL/L
CREAT SERPL-MCNC: 2.42 MG/DL
EGFR: 26 ML/MIN/1.73M2
FERRITIN SERPL-MCNC: 910 NG/ML
FOLATE SERPL-MCNC: >20 NG/ML
GLUCOSE SERPL-MCNC: 112 MG/DL
IRON SATN MFR SERPL: 19 %
IRON SERPL-MCNC: 37 UG/DL
LDH SERPL-CCNC: 255 U/L
POTASSIUM SERPL-SCNC: 4.6 MMOL/L
PROT SERPL-MCNC: 6.2 G/DL
SODIUM SERPL-SCNC: 137 MMOL/L
TIBC SERPL-MCNC: 190 UG/DL
UIBC SERPL-MCNC: 153 UG/DL
URATE SERPL-MCNC: 8.2 MG/DL
VIT B12 SERPL-MCNC: 749 PG/ML

## 2023-02-27 RX ORDER — ASPIRIN 81 MG/1
81 TABLET, CHEWABLE ORAL DAILY
Qty: 30 | Refills: 6 | Status: ACTIVE | COMMUNITY
Start: 2022-12-28 | End: 1900-01-01

## 2023-02-27 RX ORDER — DARBEPOETIN ALFA 100 UG/.5ML
100 INJECTION, SOLUTION INTRAVENOUS; SUBCUTANEOUS
Qty: 5 | Refills: 3 | Status: DISCONTINUED | COMMUNITY
Start: 2023-02-24 | End: 2023-02-27

## 2023-02-27 NOTE — REVIEW OF SYSTEMS
[Fatigue] : fatigue [Cough] : cough [Negative] : Allergic/Immunologic [Fever] : no fever [Chills] : no chills [Night Sweats] : no night sweats [Recent Change In Weight] : ~T no recent weight change [Chest Pain] : no chest pain [Palpitations] : no palpitations [Leg Claudication] : no intermittent leg claudication [Lower Ext Edema] : no lower extremity edema [Shortness Of Breath] : no shortness of breath [Wheezing] : no wheezing

## 2023-02-27 NOTE — RESULTS/DATA
[FreeTextEntry1] : 2/24/2023\par WBC 2.62\par Hgb 8.8\par Platelet 128\par Pending CMP, LDH, UA\par \par 1/26/2023\par Last Hgb 9.1, Cr 2.3 (12/16/2022) \par \par \par 12/16/2022\par HGB on 10/31/2022; 8.3, \par Bone marrow biopsy: \par Patient:   MASOOD MACARIO\par \par \par Accession:                             [id number]\par \par Collected Date/Time:                   10/31/2022 16:49 EDT\par Received Date/Time:                    10/31/2022 16:50 EDT\par \par Hematopathology Addendum Report - Auth (Verified)\par \par Hematopathology Addendum\par Additional immunohistochemical stains (block 1A: p53, ) show\par  increased  positive interstitial mast cells without aggregates. TP53\par  shows less than 1% bright positive cells.\par \par There is no change in the diagnosis.\par \par Verified by: Brooklynn Erazo\par (Electronic Signature)\par Reported on: 11/17/22 09:24 EST, [facility], [address]\[place]  [place] [address]\par Phone: [phone]   Fax: [phone]\par _________________________________________________________________\par \par Disclaimer\par Slide(s) with built in immunohistochemical study control(s) and negative\par  control associated with this case has/have been verified by the sign out\par  pathologist.\par \par For slide(s) without built in controls positive control slides has/have\par  been reviewed and approved by immunohistochemistry lab\par \par These immunohistochemical/ in-situ hybridization tests have been developed\par  and their performance characteristics determined by [facility], Department of Pathology, Division of Immunopathology,\par  [phone] [place] [address].  It has not been cleared\par  or approved by the U.S. Food and Drug Administration.  The FDA has\par  determined that such clearance or approval is not necessary.  This test\par  is used for clinical purposes.  The laboratory is certified under the\par  CLIA-88 as qualified to perform high complexity clinical testing.\par Hematopathology Addendum Report - Auth (Verified)\par \par Hematopathology Addendum\par       Comprehensive Hematopathology Report\par \par Final Diagnosis :\par 1, 2. 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 The bone marrow\par  shows hypercellularity with erythroid hyperplasia and increased ring\par  sideroblasts and dysplastic megakaryocytosis. Interstitial mast cells\par  are increased with normal morphology, few CD25 positive, negative CD30.\par  The findings show myelodysplastic syndrome with multilineage dysplasia\par  (hypolobulated megakaryocytes) and ring sideroblasts. Correlation with\par  cytogenetics, FISH, and somatic mutation analysis to evaluate for complex\par  karyotype, del(5q), -7, del(7q) SF3B1, TP53, other abnormalities is\par  done.\par Please note findings of an   abnormal male karyotype, 46,XY,del(5)(q15q33)\par [13]/46,XY[7],  a positive MDS FISH panel and [facility] NGS\par  Myeloid Report showing   Tier I: Variants of Strong Clinical Significance:\par  SF3B1 p.Aje182Oot (VAF: 39%),   Tier II: Variants of Potential Clinical\par \par  Significance: DNMT3A p.?  (VAF: 4%), Tier III: Variants of Unknown\par  Clinical Significance:   KIT p.Mzi430Yjj  (VAF: 50%) . Based on the\par  additional findings, the MDS classification (ICC) is MDS with del(5q) and\par  with WHO is MDS with low blasts and 5q deletion.\par \par Dr. Mcmahon was notified of the diagnosis on 11/07/2022.\par \par Morphology:\par Microscopic description:\par 1. Biopsy:   Sections of bone marrow biopsy and bone marrow fragments in\par  clot show hypercellularity (50-85% cellularity), erythroid predominance\par  with maturation and increased pronormoblasts, maturing and mature\par  myeloid elements, megakaryocytes at least normal in number with abnormal\par  morphology (increased hypolobulated/small forms), increased interstitial\par  mast cells without aggregates, and iron stores increased.\par \par 2. Aspirate:   Cellular spicules are present, adequate for interpretation.\par   Maturing and mature myeloid and erythroid elements are present with\par  erythroid predominance (M:E ratio (1.16:1).  Megakaryocytes appear at\par  least normal in number with small/hypolobulated morphology. Mast cells\par  are increased in the spicules (round and normally granular).\par \par Bone Marrow Aspirate Differential: (100 Cells).\par Type  % Normal*\par Blast  0% 0-3\par Neutrophil and\par    Precursors   47% 33-63\par Eosinophil  3% 1-5\par Basophil  0% 0-1\par Pronormoblast  5% 0-2\par Normoblast  43% 15-25\par Monocyte  0% 0-2\par Lymphocyte  7% 10-15\par Plasma cell  0% 0-1\par   *Adult Range\par \par Comment\par Iron stain (examined to evaluate for iron stores; see microscopic\par  description) and Giemsa stain (shows appropriate staining pattern) are\par  performed and evaluated on block(s): 1A, 1B\par \par Ancillary Studies:\par Bone marrow aspirate iron stain:   Iron stores are increased; numerous ring\par  sideroblasts are present (greater than 15%).\par \par Flow cytometry:   The myeloid immunophenotypic findings show decreased\par  myeloid granularity, no increase in myeloid immaturity (myeloblasts,\par  0.32% of cells, with normal immunophenotype), normal myeloid antigen\par  maturation pattern, few mast cells, and the presence of hematogones\par  (which are reported to be low in myelodysplasia).\par The lymphocyte immunophenotypic findings show no diagnostic abnormalities.\par \par Immunohistochemical stains   (block 1A: CD34, , myeloperoxidase,\par  CD71, E-cadherin, factor VIII, CD15, CD61, CD25, CD30, p53) show no\par  increase in CD34 positive cells (less than 3%), erythroid predominance\par  (positive with CD71), with clusters of pronormoblasts (positive with\par  E-cadherin); diminished myeloid elements with maturation (positive\par \par  with myeloperoxidase and CD15), and increased megakaryocyte number with\par  dysplastic, small/hypolobulated morphology (positive with factor VIII,\par  CD61). CD30 is negative in mast cells; a few show dim CD25 positivity.\par \par Cytogenetics:  [id number]\par Date Collected:  10/31/2022\par Result:  Abnormal male karyotype\par Karyotype: 46,XY,del(5)(q15q33)[13]/46,XY[7]\par \par Fluorescence in situ Hybridization (FISH):    [id number]\par Result: ABNORMAL FISH MDS PANEL - 5q deletion detected (65%)\par Probe(s) and Location(s):   H3Y224/ D5S23 (5p15.2), EGR1 (5q31), D7Z1\par  (7p11.1-q11.1), L8R959 (7q31), CEP-8/D8Z2   ? (8p11.1-q11.1), E17O948\par  (20q12)\par ISCN Nomenclature:  nuc clif(V6N724/T0Y30m6,EGR1x1)[130/200],\par  (D7Z1,Z9M279)x2[200], (D8Z2,S59E350)x2[197/200], (TP53x2)[197/200]\par \par Molecular Analyses:\par [facility] Myeloid Report\par Specimen ID:  [id number]\par Date Collected:  10/31/2022\par Specimen Source:  Bone Marrow\par \par RESULT SUMMARY:  ABNORMAL\par DETECTED GENOMIC ALTERATIONS:\par Tier I: Variants of Strong Clinical Significance\par SF3B1 p.Vlg452Hag\par Tier II: Variants of Potential Clinical Significance\par DNMT3A p.?\par Tier III: Variants of Unknown Clinical Significance\par KIT p.Epk998Ksv\par \par PERTINENT NEGATIVE RESULTS:\par The following genes are NEGATIVE for clinically relevant mutations.\par  Mutational hotspots and surrounding exonic regions were interrogated for\par  DNA level point mutations and indels (fusions not assayed).\par ABL1, ANKRD26, ASXL1, ATRX, BCOR, BCORL1, BRAF, CALR, CBL, CCND2, CDKN2A,\par  CEBPA, CSF3R, CUX1, DDX41, ETNK1, ETV6, EZH2, FBXW7, FLT3, GATA2, HRAS,\par  IDH1, IDH2, JAK2, KDM6A, KMT2A, KRAS, MAP2K1, MPL, MYD88, NF1, NPM1,\par  NRAS, PDGFRA, PHF6, PTEN, PTPN11, RUNX1, SETBP1, SRSF2, STAG2, TET2,\par  TP53, U2AF1, WT1, ZRSR2\par TECHNICAL SUMMARY :\par DNMT3A\par p.?\par c.2174-1G>A\par 4% allele frequency\par Exon 19\par [id number]\par \par SF3B1\par p.Juw864Uwm\par c.1998G>C\par 39% allele frequency\par Exon 14\par [id number]\par \par \par KIT\par p.Llf049Ibs\par c.1879C>T\par 50% allele frequency\par Exon 12\par [id number]\par \par Clinical History/Data  :\par History of macrocytic anemia with CKD rule out primary bone marrow\par  disorder\par \par CBC, 10/31/2022\par \par Test Code       Result         Reference\par                  Range\par WBC             5.75 K/uL      3.80 - 10.50\par RBC             2.34 M/uL L    4.20 - 5.80\par HGB             8.3 g/dL L     13.0 - 17.0\par HCT             25.4 % L       39.0 - 50.0\par MCV             108.5 fl H     80.0 - 100.0\par MCH             35.5 pg H      27.0 - 34.0\par MCHC            32.7 g/dL      32.0 - 36.0\par RDW             18.5 % H       10.3 - 14.5\par PLT             236 K/uL       150 - 400\par Auto NRBC       0 /100 WBCs    0 - 0\par NEUT%           63.5 %         43.0 - 77.0\par \par NEUT#           3.65 K/uL      1.80 - 7.40\par LYMPH%          20.5 %         13.0 - 44.0\par LYMPH#          1.18 K/uL      1.00 - 3.30\par MONO%           7.0 %          2.0 - 14.0\par MONO#           0.40 K/uL      0.00 - 0.90\par EOS%            3.3 %          0.0 - 6.0\par EOS#            0.19 K/uL      0.00 - 0.50\par BASO%           1.4 %          0.0 - 2.0\par BASO#           0.08 K/uL      0.00 - 0.20\par BUN             28 mg/dL H     7 - 23\par Creatinine      2.32 mg/dL H   0.50 - 1.30\par \par Verified by: Brooklynn Erazo\par (Electronic Signature)\par Reported on: 11/09/22 16:40 EST, [facility], [address]\par  [facility] [address], [place] [address]\par Phone: [phone]   Fax: [phone]\par \par 10/27/2022\par (Labs on 10/18/2022): Hgb stable at 9.0, normal WBC, platelets\par Creatinine 2.32\par eGFR 28\par \par \par 5/12/2022\par Available labs reviewed. \par Macrocytic anemia, no etiology could be determined. \par

## 2023-02-27 NOTE — HISTORY OF PRESENT ILLNESS
[de-identified] : CHART REVIEW: 81 year-old Mr. MACARIO is seen in consult for macrocytic  anemia (Hgb 9.5, MCV ~125). Patient has multiple medical conditions including stage-3 CKD. He has no symptoms of anemia and has no complaints.  [de-identified] : 05/12/22:\par 81 year-old Mr. MACARIO is seen in consult for macrocytic  anemia (Hgb 9.5, MCV ~125). Patient has multiple medical conditions including stage-3 CKD. He has no symptoms of anemia and has no complaints. \par \par 10/27/22:\par Patient presenting today for follow up for macrocytic anemia. Overall feels well. Endorses some fatigue and shortness of breath with activity. Denies headache, dizziness, Ambulates with walker. He is being followed by his nephrologist who manages his hypertension. Endorses taking diuretics every other day.  \par \par 12/16/2022\par Patient returns for a follow up. He endorses no change in his health status. 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 Patient has some symptoms of fatigue and tiredness. \par \par 1/26/2023\par Patient returns for a follow up. He started taking Revlimid on 12/30/2022. About 2 weeks later he started developing side effects like-- loss  of appetite, body ache, constipation, pins and needle sensation. He also appears to have developed Jaundice. \par \par 2/24/2023\par Patient seen in follow up, accompanied by Preeti ceja, at bedside. He had a recent hospitalization from 1/27 - 2/7 for new onset afib that was detected in the treatment room prior to his blood transfusion appointment. He was found to be in afib, anemic (Hgb 6.3) requiring 4 PRBCs throughout his stay. His course was c/b acute gout flare, neutropenic fever, transaminitis, and JUAN RAMON on CKD. 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 Had an episode of neutropenic fever started on Cefepime but stopped as per ID, most likely due to gout flare. UA + but asymptomatic, not treated. Bld cx neg x2. Syncopal episode while being on the commode yesterday 2/24. As per Preeti, EKG revealed rate controlled afib. Today, his HR was 127 in office. Denies dizziness, SOB, chest pain, palpitations. As per pt, was on a holter monitor that was submitted yesterday. He is f/u with Dr. Reddy (cards). He also have been having a dry cough for few days. + chills. No fever, body aches, night sweats. No sick contacts. Working with PT. Still feels weak, ambulating via wheelchair. Appetite is okay. Weight is stable.

## 2023-02-27 NOTE — PHYSICAL EXAM
[Ambulatory and capable of all self care but unable to carry out any work activities] : Status 2- Ambulatory and capable of all self care but unable to carry out any work activities. Up and about more than 50% of waking hours [Normal] : affect appropriate [de-identified] : R big toe slightly swollen - I&D incision site non erythematous, without warmth to touch. No drainage. No lower ext edema. Palpable pulses, DP/PT +1

## 2023-02-27 NOTE — ASSESSMENT
[FreeTextEntry1] : 81 year-old Mr. MACARIO is seen in follow up for macrocytic anemia (initial evaluation: HGB ~9.5, MCV ~125), WBC and platelet counts are preserved. Patient now has some symptoms of anemia. 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 He was started on Revlimid in December but was unable to tolerate due to multiple side effects. Recent hospitalization for new onset afib + anemia. Course c/b acute gout flare, neutropenic fever, transaminitis, and JUAN RAMON on CKD. He is following up with cards since discharge for afib (not on AC at this time). Will start him on Aranesp injections and support with transfusions when needed. Continue to monitor with treatment and consider restarting Revlimid once he is more stable. 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 along with primary bone marrow disorder \par - Bone marrow biopsy: MDS with 5q deletion (65% cells) and MDS-RS with SF3B1 (39% allele frequency) \par - Discussed and reviewed bone marrow findings with the patient  \par - Started on Revlimid 5 mg daily (recommended dose 10 mg) \par - Side effects from medication noted \par - Revlimid held since January 2023\par - Start 100 mcg of Aranesp every other week (ordered)\par - C/w trending CBC qw (order in)\par - Transfusion support per protocol \par - PT referral in\par - CBC, CMP, LDH, UA, RVP panel sent and pending\par - RTC in 1 month \par \par [ ] Afib\par - New onset afib\par - Likely multifactorial etiology of dehydration and anemia \par - [id number] score = 5\par - On 6.25mg of Carvedilol but held ASA and Plavix due to anemia\par - Continue f/u with Dr. Reddy \par \par [ ] Gout\par - C/w Allopurinol and Colchicine \par - Right toe without S&S of infection\par - UA sent and pending\par \par Patient seen and examined with Dr. Mcmahon \par

## 2023-02-28 ENCOUNTER — APPOINTMENT (OUTPATIENT)
Dept: RHEUMATOLOGY | Facility: CLINIC | Age: 83
End: 2023-02-28
Payer: MEDICARE

## 2023-02-28 VITALS
RESPIRATION RATE: 16 BRPM | TEMPERATURE: 97.6 F | SYSTOLIC BLOOD PRESSURE: 113 MMHG | BODY MASS INDEX: 29.73 KG/M2 | OXYGEN SATURATION: 98 % | HEIGHT: 66 IN | HEART RATE: 81 BPM | WEIGHT: 185 LBS | DIASTOLIC BLOOD PRESSURE: 74 MMHG

## 2023-02-28 PROCEDURE — 99214 OFFICE O/P EST MOD 30 MIN: CPT

## 2023-02-28 NOTE — ASSESSMENT
[FreeTextEntry1] : 82yM with erosive tophaceous gout here for post-hospitalization follow up \par \par risk factors: CKD, diuretic use, Hx of MDS\par uric acid 8.4 in Feb 2023  \par will switch colchicine to 0.6mg 3x per week  (due to interaction with carvedilol) \par increase allopurinol to 200mg qD\par goal uric acid < 5  \par Xray foot 1/ 2023 - erosive disease \par encourage f/u Podiatry, continue PT \par \par due for repeat labs in 2 weeks \par f/u in 1 month   \par \par Case discussed with Dr. Dumas \par \par Can Hu\par PGY-5 \par More than 50% of the encounter was spent counselling  MASOOD MACARIO on differential, workup, disease course, and treatment/management.   Education was provided to MASOOD MACARIO during this encounter. All questions and concerns were answered and addressed in detail.  MASOOD MACARIO verbalized understanding and agreed to plan\par \par MASOOD MACARIO has been instructed to call for an earlier appointment if new symptoms develop \par MASOOD MACARIO has been instructed to make a follow up appointment 1 month

## 2023-02-28 NOTE — DATA REVIEWED
[FreeTextEntry1] : Laboratory and radiology studies that were personally reviewed at today's visit are summarized in above

## 2023-02-28 NOTE — HISTORY OF PRESENT ILLNESS
[History of Tophi] : the patient has a history of tophi [Erosions on Xrays] : erosions on xrays [FreeTextEntry1] : 82yM with hx of MDS, chronic diastolic CHF, HTN, Prostate CA s/p Prostectomy, Carotid Art stenosis s/p CEA, PAD, Renal Art Stenosis s/p stent, CKD, p/w Acute on chronic anemia from MDS c/b New Afib, acute Gout flare, and JUAN RAMON on CKD, here for post hospitalization follow up \par \par Gout hx\par risk factors: CKD, diuretic use, Hx of MDS\par SH: denies EtOH usage, reports little red meat/sea food intake  \par recent hospitalization at [facility] from Jan-Feb 2023 for acute on chronic anemia, and acute gout flare involving R. 1st toe  \par last gout flare prior to Feb 2023 was 7-8 years ago involving toes \par maintained on Allopurinol 100mg qD  \par \par current meds: \par Allopurinol 100mg qD \par colchicine 0.6mg every other day \par Lasix 40mg qD (new - added by cardiology)  \par Metoprolol 25ng BID \par \par no pain in the R. toe, but has pins and needles sensation in b/l LE \par no complaint with other joints \par  [Current Joint Pain] : no current joint pain [FreeTextEntry7] : 1-2023 [FreeTextEntry4] : a

## 2023-02-28 NOTE — PHYSICAL EXAM
[General Appearance - Alert] : alert [General Appearance - In No Acute Distress] : in no acute distress [FreeTextEntry1] : + tophi in L ear and R. elbow,  possible tophi on the R. 1st toe,  NTP

## 2023-02-28 NOTE — REASON FOR VISIT
[Post Hospitalization] : a post hospitalization visit [Formal Caregiver] : formal caregiver [Source: ______] : History obtained from [unfilled] [FreeTextEntry1] : gout management

## 2023-03-01 ENCOUNTER — LABORATORY RESULT (OUTPATIENT)
Age: 83
End: 2023-03-01

## 2023-03-01 PROCEDURE — 93244 EXT ECG>48HR<7D REV&INTERPJ: CPT

## 2023-03-03 ENCOUNTER — APPOINTMENT (OUTPATIENT)
Dept: INFUSION THERAPY | Facility: HOSPITAL | Age: 83
End: 2023-03-03

## 2023-03-08 ENCOUNTER — LABORATORY RESULT (OUTPATIENT)
Age: 83
End: 2023-03-08

## 2023-03-09 ENCOUNTER — TRANSCRIPTION ENCOUNTER (OUTPATIENT)
Age: 83
End: 2023-03-09

## 2023-03-10 ENCOUNTER — NON-APPOINTMENT (OUTPATIENT)
Age: 83
End: 2023-03-10

## 2023-03-10 ENCOUNTER — APPOINTMENT (OUTPATIENT)
Dept: ELECTROPHYSIOLOGY | Facility: CLINIC | Age: 83
End: 2023-03-10
Payer: MEDICARE

## 2023-03-10 VITALS
BODY MASS INDEX: 29.73 KG/M2 | HEART RATE: 71 BPM | OXYGEN SATURATION: 98 % | WEIGHT: 185 LBS | HEIGHT: 66 IN | SYSTOLIC BLOOD PRESSURE: 123 MMHG | DIASTOLIC BLOOD PRESSURE: 68 MMHG

## 2023-03-10 PROCEDURE — 93000 ELECTROCARDIOGRAM COMPLETE: CPT

## 2023-03-10 PROCEDURE — 99203 OFFICE O/P NEW LOW 30 MIN: CPT | Mod: 25

## 2023-03-10 NOTE — CARDIOLOGY SUMMARY
[de-identified] : ECG from 3/10/2023: Sinus rhythm at 79bpm with APCs\par ECG from 5/31/2022: Sinus with PA: 162ms, QRSd: 102ms, normal axis, no LVH [de-identified] : [facility] XT from 2/17/2023-2/22/2023: min HR: 59, max HR: 176, mean HR: 90, no patient triggered events, no AF, 8 SVT events - fastest 4 beats at 176bpm, longest 16 beats at 129bpm, APCs: 9.2% [de-identified] : TTE from 10/3/2022: LA: 4.7, PABLITO: 40 (mild dilation), EF: 65%, stage II diastolic dysfunction, no pericardial effusion, normal RA, normal RV size and function, min PI, no PFO, normal PA pressures\par \par JEAN from 7/23/2012: mild MR, Lambl's excrescence, no AI, severe ulcerated complex plaque in the mid to distal aortic arch and descending aorta 0.5-0.8cm in thickness, concentric LV remodeling, EF: 55%, stageI diastolic dysfunction, borderline pHTN, no PFO [de-identified] : [facility] from 3/2021: LAD 60%, OM1 60%, pRCA 100% (chronic total occlusion) [de-identified] : Labs from 2/24/2023: WBC: 2.62, Hb: 8.8, Plts: 128 (prior CBCs in [place] demonstrate thrombocytopenia - jens 53), Cr: 1.85 (from 2/7/2023 GFR: 36)

## 2023-03-10 NOTE — REASON FOR VISIT
[Family Member] : family member [Other: _____] : [unfilled] [FreeTextEntry3] : Dr. Gabriel Reddy [FreeTextEntry1] : Renal: Dr. Mohsen Cabral

## 2023-03-10 NOTE — PHYSICAL EXAM
[Well Developed] : well developed [Well Nourished] : well nourished [No Acute Distress] : no acute distress [Normal Venous Pressure] : normal venous pressure [Normal S1, S2] : normal S1, S2 [Clear Lung Fields] : clear lung fields [Good Air Entry] : good air entry [Soft] : abdomen soft [Non Tender] : non-tender [No Edema] : no edema [No Rash] : no rash [No Skin Lesions] : no skin lesions [Moves all extremities] : moves all extremities [No Focal Deficits] : no focal deficits [Alert and Oriented] : alert and oriented [Normal memory] : normal memory [de-identified] : Was in wheelchair [de-identified] : 2/6 AS murmur

## 2023-03-10 NOTE — DISCUSSION/SUMMARY
[FreeTextEntry1] : Mr. Richi Moya is a 82 year old man with gout, myelodysplastic syndrome, coronary artery disease, carotid artery stenosis, hypertension, hyperlipidemia, chronic kidney disease stage III, renal artery stenosis (status post a left renal artery stent). He presents today for an initial evaluation of reported atrial fibrillation. There are no strips available for my review. In the setting of his gait instability, anemia requiring transfusions and multiple other medications that predispose to bleeding it would be prudent to confirm the diagnosis of atrial fibrillation prior to consideration of therapeutic anticoagulation. With regards to the patient's reported episodes of syncope, the history is consistent with likely vagally mediated events. \par \par Given that an outpatient monitor did not reveal any atrial fibrillation or bradyarrhythmic events, we discussed the role of an implantable loop recorder for extended monitoring. The patient will call the office if he wishes to move forward with the loop recorder. We discussed that atrial fibrillation can cause thromboembolic events including cerebrovascular accidents. We also discussed that his current medications would not be sufficient for thromboembolic prophylaxis in the event his reported diagnosis of atrial fibrillation is confirmed. In the event atrial fibrillation was detected, we would have to consider the risks and adding an oral anticoagulant to Cilostazol and Aspirin. \par \par CHADS2-VASC: 4 (Age: 2, CAD: 1, HTN: 1) [EKG obtained to assist in diagnosis and management of assessed problem(s)] : EKG obtained to assist in diagnosis and management of assessed problem(s)

## 2023-03-10 NOTE — HISTORY OF PRESENT ILLNESS
[FreeTextEntry1] : Mr. Richi Moya is a 82 year old man with gout, myelodysplastic syndrome, coronary artery disease, carotid artery stenosis, hypertension, hyperlipidemia, chronic kidney disease stage III, renal artery stenosis (status post a left renal artery stent). He presents today for an initial evaluation of reported atrial fibrillation.\par \par The patient's atrial fibrillation was reportedly diagnosed at his Hematologist's office. There are no electrocardiograms or strips available for my review. The patient was given a Tilsono XT monitor from 2/17/2023-2/22/2023 that did not demonstrate atrial fibrillation. The patient's current medication list includes Cilostazol, Plavix, Aspirin and Metoprolol 25mg twice daily. He reports that he is not taking Plavix.\par \par The patient also has also had two episodes of syncope. He describes these events as "nodding out" while on the toilet. He has not had any other events but was reportedly difficult to arouse while on the toilet. He denies chest pain, palpitations or shortness of breath. He uses a walker when moving around his home and a wheelchair when he leaves the house. He notes mild shortness of breath while getting dressed but not while walking.  \par \par CHADS2-VASC: 4 (Age: 2, CAD: 1, HTN: 1)

## 2023-03-14 ENCOUNTER — TRANSCRIPTION ENCOUNTER (OUTPATIENT)
Age: 83
End: 2023-03-14

## 2023-03-15 ENCOUNTER — LABORATORY RESULT (OUTPATIENT)
Age: 83
End: 2023-03-15

## 2023-03-16 ENCOUNTER — NON-APPOINTMENT (OUTPATIENT)
Age: 83
End: 2023-03-16

## 2023-03-20 ENCOUNTER — TRANSCRIPTION ENCOUNTER (OUTPATIENT)
Age: 83
End: 2023-03-20

## 2023-03-20 ENCOUNTER — OUTPATIENT (OUTPATIENT)
Dept: OUTPATIENT SERVICES | Facility: HOSPITAL | Age: 83
LOS: 1 days | Discharge: HOME | End: 2023-03-20

## 2023-03-20 DIAGNOSIS — Z98.49 CATARACT EXTRACTION STATUS, UNSPECIFIED EYE: Chronic | ICD-10-CM

## 2023-03-20 DIAGNOSIS — D64.9 ANEMIA, UNSPECIFIED: ICD-10-CM

## 2023-03-20 DIAGNOSIS — Z98.890 OTHER SPECIFIED POSTPROCEDURAL STATES: Chronic | ICD-10-CM

## 2023-03-20 DIAGNOSIS — Z90.79 ACQUIRED ABSENCE OF OTHER GENITAL ORGAN(S): Chronic | ICD-10-CM

## 2023-03-20 DIAGNOSIS — Z86.79 PERSONAL HISTORY OF OTHER DISEASES OF THE CIRCULATORY SYSTEM: Chronic | ICD-10-CM

## 2023-03-22 ENCOUNTER — LABORATORY RESULT (OUTPATIENT)
Age: 83
End: 2023-03-22

## 2023-03-22 ENCOUNTER — RX RENEWAL (OUTPATIENT)
Age: 83
End: 2023-03-22

## 2023-03-24 ENCOUNTER — APPOINTMENT (OUTPATIENT)
Dept: HEMATOLOGY ONCOLOGY | Facility: CLINIC | Age: 83
End: 2023-03-24
Payer: MEDICARE

## 2023-03-24 ENCOUNTER — RESULT REVIEW (OUTPATIENT)
Age: 83
End: 2023-03-24

## 2023-03-24 ENCOUNTER — APPOINTMENT (OUTPATIENT)
Dept: CARDIOLOGY | Facility: CLINIC | Age: 83
End: 2023-03-24

## 2023-03-24 VITALS
SYSTOLIC BLOOD PRESSURE: 111 MMHG | RESPIRATION RATE: 16 BRPM | BODY MASS INDEX: 30.05 KG/M2 | DIASTOLIC BLOOD PRESSURE: 58 MMHG | HEIGHT: 65.98 IN | OXYGEN SATURATION: 97 % | HEART RATE: 70 BPM | WEIGHT: 186.97 LBS | TEMPERATURE: 97 F

## 2023-03-24 LAB
BASOPHILS # BLD AUTO: 0.03 K/UL — SIGNIFICANT CHANGE UP (ref 0–0.2)
BASOPHILS NFR BLD AUTO: 0.6 % — SIGNIFICANT CHANGE UP (ref 0–2)
EOSINOPHIL # BLD AUTO: 0.14 K/UL — SIGNIFICANT CHANGE UP (ref 0–0.5)
EOSINOPHIL NFR BLD AUTO: 2.6 % — SIGNIFICANT CHANGE UP (ref 0–6)
HCT VFR BLD CALC: 26.9 % — LOW (ref 39–50)
HGB BLD-MCNC: 9 G/DL — LOW (ref 13–17)
IMM GRANULOCYTES NFR BLD AUTO: 1.1 % — HIGH (ref 0–0.9)
LYMPHOCYTES # BLD AUTO: 1.59 K/UL — SIGNIFICANT CHANGE UP (ref 1–3.3)
LYMPHOCYTES # BLD AUTO: 29.4 % — SIGNIFICANT CHANGE UP (ref 13–44)
MCHC RBC-ENTMCNC: 33.5 G/DL — SIGNIFICANT CHANGE UP (ref 32–36)
MCHC RBC-ENTMCNC: 37.7 PG — HIGH (ref 27–34)
MCV RBC AUTO: 112.6 FL — HIGH (ref 80–100)
MONOCYTES # BLD AUTO: 0.41 K/UL — SIGNIFICANT CHANGE UP (ref 0–0.9)
MONOCYTES NFR BLD AUTO: 7.6 % — SIGNIFICANT CHANGE UP (ref 2–14)
NEUTROPHILS # BLD AUTO: 3.17 K/UL — SIGNIFICANT CHANGE UP (ref 1.8–7.4)
NEUTROPHILS NFR BLD AUTO: 58.7 % — SIGNIFICANT CHANGE UP (ref 43–77)
NRBC # BLD: 0 /100 WBCS — SIGNIFICANT CHANGE UP (ref 0–0)
PLATELET # BLD AUTO: 160 K/UL — SIGNIFICANT CHANGE UP (ref 150–400)
RBC # BLD: 2.39 M/UL — LOW (ref 4.2–5.8)
RBC # FLD: 23.1 % — HIGH (ref 10.3–14.5)
WBC # BLD: 5.4 K/UL — SIGNIFICANT CHANGE UP (ref 3.8–10.5)
WBC # FLD AUTO: 5.4 K/UL — SIGNIFICANT CHANGE UP (ref 3.8–10.5)

## 2023-03-24 PROCEDURE — 99213 OFFICE O/P EST LOW 20 MIN: CPT

## 2023-03-24 NOTE — HISTORY OF PRESENT ILLNESS
[de-identified] : CHART REVIEW: 81 year-old Mr. MACARIO is seen in consult for macrocytic  anemia (Hgb 9.5, MCV ~125). Patient has multiple medical conditions including stage-3 CKD. He has no symptoms of anemia and has no complaints.  [de-identified] : 05/12/22:\par 81 year-old Mr. MACARIO is seen in consult for macrocytic  anemia (Hgb 9.5, MCV ~125). Patient has multiple medical conditions including stage-3 CKD. He has no symptoms of anemia and has no complaints. \par \par 10/27/22:\par Patient presenting today for follow up for macrocytic anemia. Overall feels well. Endorses some fatigue and shortness of breath with activity. Denies headache, dizziness, Ambulates with walker. He is being followed by his nephrologist who manages his hypertension. Endorses taking diuretics every other day.  \par \par 12/16/2022\par Patient returns for a follow up. He endorses no change in his health status. 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 Patient has some symptoms of fatigue and tiredness. \par \par 1/26/2023\par Patient returns for a follow up. He started taking Revlimid on 12/30/2022. About 2 weeks later he started developing side effects like-- loss  of appetite, body ache, constipation, pins and needle sensation. He also appears to have developed Jaundice. \par \par 2/24/2023\par Patient seen in follow up, accompanied by Preeti ceja, at bedside. He had a recent hospitalization from 1/27 - 2/7 for new onset afib that was detected in the treatment room prior to his blood transfusion appointment. He was found to be in afib, anemic (Hgb 6.3) requiring 4 PRBCs throughout his stay. His course was c/b acute gout flare, neutropenic fever, transaminitis, and JUAN RAMON on CKD. 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 Had an episode of neutropenic fever started on Cefepime but stopped as per ID, most likely due to gout flare. UA + but asymptomatic, not treated. Bld cx neg x2. Syncopal episode while being on the commode yesterday 2/24. As per Preeti, EKG revealed rate controlled afib. Today, his HR was 127 in office. Denies dizziness, SOB, chest pain, palpitations. As per pt, was on a holter monitor that was submitted yesterday. He is f/u with Dr. Reddy (cards). He also have been having a dry cough for few days. + chills. No fever, body aches, night sweats. No sick contacts. Working with PT. Still feels weak, ambulating via wheelchair. Appetite is okay. Weight is stable. \par \par 3/24/2023\par Patient seen in follow up. Accompanied by Preeti ceja at bedside. He has been responding well to 20k U Procrit weekly injections. Last Hgb 10.2, BP stable 111/58. Since his last visit, has been evaluated by cards for his afib (now rate controlled) currently on Metoprolol 25mg BID. He reports feeling much better since last encounter - he is now able to ambulate longer distances with a walker. Endorses unsteady gait (vertigo). Appetite is good, weight is stable. \par

## 2023-03-24 NOTE — RESULTS/DATA
[FreeTextEntry1] : 3/24/2023\par WBC 6.28\par Hgb 10.2\par .7\par Platelet 224\par Last Cr (2/24/23) - 2.42\par Pending repeat CBC, CMP, LDH, UA\par \par 2/24/2023\par WBC 2.62\par Hgb 8.8\par Platelet 128\par Pending CMP, LDH, UA\par \par 1/26/2023\par Last Hgb 9.1, Cr 2.3 (12/16/2022) \par \par \par 12/16/2022\par HGB on 10/31/2022; 8.3, \par Bone marrow biopsy: \par Patient:   MASOOD MACARIO\par \par \par Accession:                             [id number]\par \par Collected Date/Time:                   10/31/2022 16:49 EDT\par Received Date/Time:                    10/31/2022 16:50 EDT\par \par Hematopathology Addendum Report - Auth (Verified)\par \par Hematopathology Addendum\par Additional immunohistochemical stains (block 1A: p53, ) show\par  increased  positive interstitial mast cells without aggregates. TP53\par  shows less than 1% bright positive cells.\par \par There is no change in the diagnosis.\par \par Verified by: Brooklynn Erazo\par (Electronic Signature)\par Reported on: 11/17/22 09:24 EST, [facility], [address]\[place]  [place] [address]\par Phone: [phone]   Fax: [phone]\par _________________________________________________________________\par \par Disclaimer\par Slide(s) with built in immunohistochemical study control(s) and negative\par  control associated with this case has/have been verified by the sign out\par  pathologist.\par \par For slide(s) without built in controls positive control slides has/have\par  been reviewed and approved by immunohistochemistry lab\par \par These immunohistochemical/ in-situ hybridization tests have been developed\par  and their performance characteristics determined by [facility], Department of Pathology, Division of Immunopathology,\par  [id number] [place] [address].  It has not been cleared\par  or approved by the U.S. Food and Drug Administration.  The FDA has\par  determined that such clearance or approval is not necessary.  This test\par  is used for clinical purposes.  The laboratory is certified under the\par  CLIA-88 as qualified to perform high complexity clinical testing.\par Hematopathology Addendum Report - Auth (Verified)\par \par Hematopathology Addendum\par       Comprehensive Hematopathology Report\par \par Final Diagnosis :\par 1, 2. 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 The bone marrow\par  shows hypercellularity with erythroid hyperplasia and increased ring\par  sideroblasts and dysplastic megakaryocytosis. Interstitial mast cells\par  are increased with normal morphology, few CD25 positive, negative CD30.\par  The findings show myelodysplastic syndrome with multilineage dysplasia\par  (hypolobulated megakaryocytes) and ring sideroblasts. Correlation with\par  cytogenetics, FISH, and somatic mutation analysis to evaluate for complex\par  karyotype, del(5q), -7, del(7q) SF3B1, TP53, other abnormalities is\par  done.\par Please note findings of an   abnormal male karyotype, 46,XY,del(5)(q15q33)\par [13]/46,XY[7],  a positive MDS FISH panel and [facility] Advanced NGS\par  Myeloid Report showing   Tier I: Variants of Strong Clinical Significance:\par  SF3B1 p.[id number] (VAF: 39%),   Tier II: Variants of Potential Clinical\par \par  Significance: DNMT3A p.?  (VAF: 4%), Tier III: Variants of Unknown\par  Clinical Significance:   KIT p.[id number]  (VAF: 50%) . Based on the\par  additional findings, the MDS classification (ICC) is MDS with del(5q) and\par  with WHO is MDS with low blasts and 5q deletion.\par \par Dr. Mcmahon was notified of the diagnosis on 11/07/2022.\par \par Morphology:\par Microscopic description:\par 1. Biopsy:   Sections of bone marrow biopsy and bone marrow fragments in\par  clot show hypercellularity (50-85% cellularity), erythroid predominance\par  with maturation and increased pronormoblasts, maturing and mature\par  myeloid elements, megakaryocytes at least normal in number with abnormal\par  morphology (increased hypolobulated/small forms), increased interstitial\par  mast cells without aggregates, and iron stores increased.\par \par 2. Aspirate:   Cellular spicules are present, adequate for interpretation.\par   Maturing and mature myeloid and erythroid elements are present with\par  erythroid predominance (M:E ratio (1.16:1).  Megakaryocytes appear at\par  least normal in number with small/hypolobulated morphology. Mast cells\par  are increased in the spicules (round and normally granular).\par \par Bone Marrow Aspirate Differential: (100 Cells).\par Type  % Normal*\par Blast  0% 0-3\par Neutrophil and\par    Precursors   47% 33-63\par Eosinophil  3% 1-5\par Basophil  0% 0-1\par Pronormoblast  5% 0-2\par Normoblast  43% 15-25\par Monocyte  0% 0-2\par Lymphocyte  7% 10-15\par Plasma cell  0% 0-1\par   *Adult Range\par \par Comment\par Iron stain (examined to evaluate for iron stores; see microscopic\par  description) and Giemsa stain (shows appropriate staining pattern) are\par  performed and evaluated on block(s): 1A, 1B\par \par Ancillary Studies:\par Bone marrow aspirate iron stain:   Iron stores are increased; numerous ring\par  sideroblasts are present (greater than 15%).\par \par Flow cytometry:   The myeloid immunophenotypic findings show decreased\par  myeloid granularity, no increase in myeloid immaturity (myeloblasts,\par  0.32% of cells, with normal immunophenotype), normal myeloid antigen\par  maturation pattern, few mast cells, and the presence of hematogones\par  (which are reported to be low in myelodysplasia).\par The lymphocyte immunophenotypic findings show no diagnostic abnormalities.\par \par Immunohistochemical stains   (block 1A: CD34, , myeloperoxidase,\par  CD71, E-cadherin, factor VIII, CD15, CD61, CD25, CD30, p53) show no\par  increase in CD34 positive cells (less than 3%), erythroid predominance\par  (positive with CD71), with clusters of pronormoblasts (positive with\par  E-cadherin); diminished myeloid elements with maturation (positive\par \par  with myeloperoxidase and CD15), and increased megakaryocyte number with\par  dysplastic, small/hypolobulated morphology (positive with factor VIII,\par  CD61). CD30 is negative in mast cells; a few show dim CD25 positivity.\par \par Cytogenetics:  [id number]\par Date Collected:  10/31/2022\par Result:  Abnormal male karyotype\par Karyotype: 46,XY,del(5)(q15q33)[13]/46,XY[7]\par \par Fluorescence in situ Hybridization (FISH):    [id number]\par Result: ABNORMAL FISH MDS PANEL - 5q deletion detected (65%)\par Probe(s) and Location(s):   V1B904/ D5S23 (5p15.2), EGR1 (5q31), D7Z1\par  (7p11.1-q11.1), V7J208 (7q31), CEP-8/D8Z2   ? (8p11.1-q11.1), L43O692\par  (20q12)\par ISCN Nomenclature:  nuc clif(U3H620/E8S39w8,EGR1x1)[130/200],\par  (D7Z1,D1R570)x2[200], (D8Z2,U79U588)x2[197/200], (TP53x2)[197/200]\par \par Molecular Analyses:\par [facility] Myeloid Report\par Specimen ID:  [id number]\par Date Collected:  10/31/2022\par Specimen Source:  Bone Marrow\par \par RESULT SUMMARY:  ABNORMAL\par DETECTED GENOMIC ALTERATIONS:\par Tier I: Variants of Strong Clinical Significance\par SF3B1 p.Wbh404Qtn\par Tier II: Variants of Potential Clinical Significance\par DNMT3A p.?\par Tier III: Variants of Unknown Clinical Significance\par KIT p.[id number]\par \par PERTINENT NEGATIVE RESULTS:\par The following genes are NEGATIVE for clinically relevant mutations.\par  Mutational hotspots and surrounding exonic regions were interrogated for\par  DNA level point mutations and indels (fusions not assayed).\par ABL1, ANKRD26, ASXL1, ATRX, BCOR, BCORL1, BRAF, CALR, CBL, CCND2, CDKN2A,\par  CEBPA, CSF3R, CUX1, DDX41, ETNK1, ETV6, EZH2, FBXW7, FLT3, GATA2, HRAS,\par  IDH1, IDH2, JAK2, KDM6A, KMT2A, KRAS, MAP2K1, MPL, MYD88, NF1, NPM1,\par  NRAS, PDGFRA, PHF6, PTEN, PTPN11, RUNX1, SETBP1, SRSF2, STAG2, TET2,\par  TP53, U2AF1, WT1, ZRSR2\par TECHNICAL SUMMARY :\par DNMT3A\par p.?\par c.2174-1G>A\par 4% allele frequency\par Exon 19\par [id number]\par \par SF3B1\par p.[id number]\par c.1998G>C\par 39% allele frequency\par Exon 14\par [id number]\par \par \par KIT\par p.Zvb891Xqd\par c.1879C>T\par 50% allele frequency\par Exon 12\par [id number]\par \par Clinical History/Data  :\par History of macrocytic anemia with CKD rule out primary bone marrow\par  disorder\par \par CBC, 10/31/2022\par \par Test Code       Result         Reference\par                  Range\par WBC             5.75 K/uL      3.80 - 10.50\par RBC             2.34 M/uL L    4.20 - 5.80\par HGB             8.3 g/dL L     13.0 - 17.0\par HCT             25.4 % L       39.0 - 50.0\par MCV             108.5 fl H     80.0 - 100.0\par MCH             35.5 pg H      27.0 - 34.0\par MCHC            32.7 g/dL      32.0 - 36.0\par RDW             18.5 % H       10.3 - 14.5\par PLT             236 K/uL       150 - 400\par Auto NRBC       0 /100 WBCs    0 - 0\par NEUT%           63.5 %         43.0 - 77.0\par \par NEUT#           3.65 K/uL      1.80 - 7.40\par LYMPH%          20.5 %         13.0 - 44.0\par LYMPH#          1.18 K/uL      1.00 - 3.30\par MONO%           7.0 %          2.0 - 14.0\par MONO#           0.40 K/uL      0.00 - 0.90\par EOS%            3.3 %          0.0 - 6.0\par EOS#            0.19 K/uL      0.00 - 0.50\par BASO%           1.4 %          0.0 - 2.0\par BASO#           0.08 K/uL      0.00 - 0.20\par BUN             28 mg/dL H     7 - 23\par Creatinine      2.32 mg/dL H   0.50 - 1.30\par \par Verified by: Brooklynn Erazo\par (Electronic Signature)\par Reported on: 11/09/22 16:40 EST, [facility], [address]\par  [facility] [address], [place] [address]\par Phone: [phone]   Fax: [phone]\par \par 10/27/2022\par (Labs on 10/18/2022): Hgb stable at 9.0, normal WBC, platelets\par Creatinine 2.32\par eGFR 28\par \par \par 5/12/2022\par Available labs reviewed. \par Macrocytic anemia, no etiology could be determined. \par

## 2023-03-24 NOTE — REVIEW OF SYSTEMS
[Negative] : Respiratory [Chest Pain] : no chest pain [Palpitations] : no palpitations [Leg Claudication] : no intermittent leg claudication [Lower Ext Edema] : no lower extremity edema

## 2023-03-24 NOTE — PHYSICAL EXAM
[Ambulatory and capable of all self care but unable to carry out any work activities] : Status 2- Ambulatory and capable of all self care but unable to carry out any work activities. Up and about more than 50% of waking hours [Normal] : affect appropriate [de-identified] : afib rate controlled HR 70 [de-identified] : seen in wheelchair, when off the wheel chair unsteady gait but is able to walk from door to the wheelchair  [de-identified] : unsteady gait

## 2023-03-24 NOTE — ASSESSMENT
[FreeTextEntry1] : 81 year-old Mr. MACARIO is seen in follow up for macrocytic anemia (initial evaluation: HGB ~9.5, MCV ~125), WBC and platelet counts are preserved. Patient now has some symptoms of anemia. 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 He was started on Revlimid in December but was unable to tolerate due to multiple side effects. Recent hospitalization for new onset afib + anemia. Course c/b acute gout flare, neutropenic fever, transaminitis, and JUAN RAMON on CKD. Since his discharge from the hospital, he has had two vagal episodes with afib RVR which is now being controlled by 25mg Metoprolol BID. Clinically, he is responding well to the Procrit injections with his Hgb ~10 and appears with more energy and strength. Will consider re-starting Revlimid when his Hgb continues to be stable (Hgb >10). 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 along with primary bone marrow disorder \par - Bone marrow biopsy: MDS with 5q deletion (65% cells) and MDS-RS with SF3B1 (39% allele frequency) \par - Discussed and reviewed bone marrow findings with the patient  \par - Started on Revlimid 5 mg daily (recommended dose 10 mg) \par - Side effects from medication noted requiring hospitalization\par - Revlimid held since January 2023\par - Started 20,000U Procrit injections since February with great response\par - Hgb ~10\par - Continue 20,000U Procrit weekly injections (hold for Hgb >/= 11)\par - C/w trending CBC qw (order in)\par - Transfusion support per protocol \par - PT transportation referral arranged with  \par - CBC, CMP, LDH, UA sent and pending\par - RTC in 1 month \par \par [ ] Afib, rate controlled \par - New onset afib\par - Likely multifactorial etiology of dehydration and anemia \par - [id number] score = 5\par - On 6.25mg of Carvedilol but held ASA and Plavix due to anemia\par - Evaluated by cards and started on Metoprolol \par - Now afib rate controlled\par \par [ ] Gout\par - C/w Allopurinol and Colchicine \par - Right toe without S&S of infection\par - Last uric acid (2/24/23) 8.2 - continue Allopurinol\par \par Patient seen and examined with Dr. Mcmahon \par

## 2023-03-27 ENCOUNTER — NON-APPOINTMENT (OUTPATIENT)
Age: 83
End: 2023-03-27

## 2023-03-27 LAB
ALBUMIN SERPL ELPH-MCNC: 4 G/DL
ALP BLD-CCNC: 74 U/L
ALT SERPL-CCNC: 8 U/L
ANION GAP SERPL CALC-SCNC: 13 MMOL/L
AST SERPL-CCNC: 13 U/L
BILIRUB SERPL-MCNC: 0.5 MG/DL
BUN SERPL-MCNC: 23 MG/DL
CALCIUM SERPL-MCNC: 8.7 MG/DL
CHLORIDE SERPL-SCNC: 101 MMOL/L
CO2 SERPL-SCNC: 26 MMOL/L
CREAT SERPL-MCNC: 2.29 MG/DL
EGFR: 28 ML/MIN/1.73M2
GLUCOSE SERPL-MCNC: 169 MG/DL
HAPTOGLOB SERPL-MCNC: 78 MG/DL
LDH SERPL-CCNC: 209 U/L
MAGNESIUM SERPL-MCNC: 1.7 MG/DL
PHOSPHATE SERPL-MCNC: 3 MG/DL
POTASSIUM SERPL-SCNC: 3.7 MMOL/L
PROT SERPL-MCNC: 6.3 G/DL
SODIUM SERPL-SCNC: 141 MMOL/L
URATE SERPL-MCNC: 8.2 MG/DL

## 2023-03-28 ENCOUNTER — APPOINTMENT (OUTPATIENT)
Dept: RHEUMATOLOGY | Facility: CLINIC | Age: 83
End: 2023-03-28

## 2023-03-29 ENCOUNTER — LABORATORY RESULT (OUTPATIENT)
Age: 83
End: 2023-03-29

## 2023-04-05 ENCOUNTER — LABORATORY RESULT (OUTPATIENT)
Age: 83
End: 2023-04-05

## 2023-04-06 ENCOUNTER — LABORATORY RESULT (OUTPATIENT)
Age: 83
End: 2023-04-06

## 2023-04-12 ENCOUNTER — LABORATORY RESULT (OUTPATIENT)
Age: 83
End: 2023-04-12

## 2023-04-12 NOTE — ED ADULT TRIAGE NOTE - NS ED NURSE AMBULANCES
"BOTULINUM TOXIN PROCEDURE - CERVICAL DYSTONIA - NOTE       PHYSICAL EXAM:  Head is tilted to the right     CD PHYSICAL EXAM:  HEAD, NECK AND TRUNK PATTERN:   Tremor:   Not Observed  Head & Neck Flexion:  Not Present  Head & Neck Extension:   Present  Sub-Occipital Extension:   Not Present  Head & Neck Rotation:  limited turning to the right   Head & Neck Lateral Bend:  left lateral bending is limited by tight muscles at the base of the left neck   Shoulder Elevation:   left        SPASTICITY PATTERN, HISTORY & PHYSICAL:  Christina Tietz presents with history of chronic migraines which were periodic. She started having migraines at age 25 years. She has a history of TBI       Mechanism of injury: she notes that she was at home, when she slipped on spilled juice. She had LOC, she woke up and heard her children screaming 'mommy don't die\". She also noted swelling on the right temple area. She did got up and drove the children to school. She noted she had pain in the neck/head and speech was slurred. She developed nausea. She also realized that she could not do math homework.     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 Since last surgery, she notes that her migraines have gotten worse, she gets more than 2 migraines a week.     She was diagnosed with Lyme's disease     HPI:  We reviewed the recommended safety guidelines for  Botox from any vaccine injection, such as the seasonal flu vaccine, by a minimum of 10-14 days with Christina Tietz. She acknowledged understanding.    She has had severe dizziness, and spasms that go down her neck. She has numbness in both hands.     DX for Botox: G24.3  Cervical dystonia    RESPONSE TO PREVIOUS TREATMENT:  Last injection on 01/19/2023  CESAR 04/13/2023    She notes her last few weeks have excellent response.  Prior to that her headcahes were much better. However her tightness have improved in the neck with easier ROM " with activities of daily living.     She had an ED visit on 3/18/23 for vomiting and black outs.         BOTULINUM NEUROTOXIN INJECTION PROCEDURES:      VERIFICATION OF PATIENT IDENTIFICATION AND PROCEDURE     Initials   Patient Name fi   Patient  fi   Procedure Verified by: trung     Prior to the start of the procedure and with procedural staff participation, I verbally confirmed the patient s identity using two indicators, relevant allergies, that the procedure was appropriate and matched the consent or emergent situation, and that the correct equipment/implants were available. Immediately prior to starting the procedure I conducted the Time Out with the procedural staff and re-confirmed the patient s name, procedure, and site/side. (The Joint Commission universal protocol was followed.)  Yes    Sedation (Moderate or Deep): None    Above assessments performed by:  Brenda Lewis MD, [facility]   Department of Rehabilitation         INDICATIONS FOR PROCEDURES:  Christina Tietz is a 45 year old patient with cervical dystonia associated with oromandibular components.     Her baseline symptoms have been recalcitrant to oral medications and conservative therapy.  She is here today for reinjection with Botox.    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      GOAL OF PROCEDURE:  The goal of this procedure is to increase active range of motion and decrease pain .    TOTAL DOSE ADMINISTERED:  Dose Administered:  245 units  Botox (Botulinum Toxin Type A)       2:1 Dilution   Unavoidable waste 55 units     Diluent Used: Bupivacaine 0.5%   Total Volume of Diluent Used:  4 ml  Please see MAR for Lot # and Expiration Date information   NDC #: Botox 100u ([address]-80)     Bupivacaine 0.5%   Batch number [id number]  Exp date 24    Medication guide was offered to patient and was accepted.        CONSENT:  The risks, benefits,  and treatment options were discussed with Christina Tietz and she agreed to proceed.    Written consent was obtained by .         EQUIPMENT USED:  Needle-25mm stimulating/recording  EMG/NCS Machine        SKIN PREPARATION:  Skin preparation was performed using an alcohol wipe.        GUIDANCE DESCRIPTION:  Electro-myographic guidance was necessary throughout the procedure to accurately identify all areas of dystonic muscles while avoiding injection of non-dystonic muscles, neighboring nerves and nearby vascular structures.       AREA/MUSCLE INJECTED:    1 & 2. SHOULDER GIRDLE & NECK MUSCLES: 160 units Botox = Total Dose, 2:1 Dilution      Right lateral upper Trapezius - 10 units of Botox at 3 site/s.   Left lateral upper Trapezius -  15 units of Botox at 3 site/s.    Right longissimus 5 units in 1 site  Left longissimus 5 units in 1 site    Right splenius 10 units in 1 site  Left splenius 10 units in 1 site    Right Levator scapulae 15 units in 1 site  Left Levator scapulae 10 units in 1 site    Right semispinalis 10 units in 1 site  Left semispinalis 10 units in 1 site    Right rhomboid  10 units in 1 site    Left rhomboid 10 units in 1 site    Left ant scalene  15 units in 2 sites   Right ant scalene 15 units in 2 sites     Right SCM 5 units in 1 site  Left SCM 5 units in 1 site    3. JAW, HEAD & SCALP MUSCLES: 85 units Botox = Total Dose, 2:1 Dilution\        Right Temporalis - 20 units of Botox at 4 site/s.  Left Temporalis - 20 units of Botox at 5 site/s.     Right Frontalis - 10 units of Botox at 2 site/s.  Left Frontalis - 10 units of Botox at 2 site/s.    Right  - 2.5 units of Botox at 1 site/s.              Left  - 2.5 units of Botox at 1 site/s.     Procerus - 5 units of Botox at 1 site/s.    Right  Masseter 7.5 units    Left Masseter 7.5 units           RESPONSE TO PROCEDURE:  Christina Tietz tolerated the procedure well and there were no immediate complications.   She was allowed to  recover for an appropriate period of time and was discharged home in stable condition.        FOLLOW UP:  Christina Tietz was asked to follow up by phone in 7-14 days with Mirta Beltran RN, Care Coordinator, to report her response to this series of injections.  Based on the patient's previous response to this therapy, Christina Tietz was rescheduled for the next series of injections in 12 weeks.        PLAN (Medication Changes, Therapy Orders, Work or Disability Issues, etc.): Patient will continue to monitor response to today's injections.     Patient reports tightness along the entire side of the left body, which is keeping her awake at night. We discussed baclofen, her ANNETTE has been negative but will defer baclofen.   Will prescribe flexeril at bedtime prn which will also help with insomnia. Will not change valium usage for now.        [employer]

## 2023-04-19 ENCOUNTER — LABORATORY RESULT (OUTPATIENT)
Age: 83
End: 2023-04-19

## 2023-04-20 ENCOUNTER — LABORATORY RESULT (OUTPATIENT)
Age: 83
End: 2023-04-20

## 2023-04-24 ENCOUNTER — RX RENEWAL (OUTPATIENT)
Age: 83
End: 2023-04-24

## 2023-04-27 ENCOUNTER — LABORATORY RESULT (OUTPATIENT)
Age: 83
End: 2023-04-27

## 2023-04-28 NOTE — ED ADULT TRIAGE NOTE - PATIENT ON (OXYGEN DELIVERY METHOD)
Date of Consult:    4/28/2023    INDICATION(s) FOR CARDIOLOGY CONSULT:    Elevated troponin , cardiomyopathy, acute HFrEF    Referring Physician:    Dr. Gonzalez     HPI:    25 year old female without significant pmh , presented 3/27/23 with week hx of URI sx, fevers, weakness, passed out today at home. EMS found BP 60/40s. CXR and CT scan with multifocal pneumonia. She remained hypotensive and hypoxic in ED, requiring Levophed, vasopressin and epinephrine gtts and BiPap. She did receive 1 mg of Epi in ED, HR increased to 160s and BP 200s temporarily. Admitted to ICU with acute hypoxic respiratory failure, shock, pneumonia. Troponin was negative on presentation, subsequent 1,201. pBNP 2,192. WBC 13. Lactate 6.2 -> 4.9. EKGs reviewed, ST anterior lateral ST depression. Diffuse ST depression (was after epi 1 mg dose in ED). Subsequent EKG NSR, improved ST changes. Echo EF 31% global. She remains in ICU on BiPap, currently on vasopressin and Levophed.       Patient Active Problem List    Diagnosis Date Noted   • Pneumonia of both lungs due to infectious organism, unspecified part of lung 04/28/2023     Priority: Low   • Encounter for female birth control 11/11/2016     Priority: Low   • Acne vulgaris 07/01/2016     Priority: Low     Sees [facility]  Was on Doxycycline, currently on minocycline as well as topicals     • Exercise-induced asthma 07/01/2016     Albuterol PRN with cardio       Past Medical History:   Diagnosis Date   • Anxiety    • Encounter for female birth control 11/11/2016   • Melanoma (CMD) 2005   • Scoliosis      Past Surgical History:   Procedure Laterality Date   • Mole removal  2005     Prior to Admission medications    Medication Sig Start Date End Date Taking? Authorizing Provider   norgestimate-ethinyl estradiol, triphasic, (Tri Femynor) 0.18/0.215/0.25 MG-35 MCG tablet Take 1 tablet by mouth daily.   Yes Provider, Outside   Probiotic Product (PROBIOTIC PO) Take 1 Capful by mouth  daily.   Yes Provider, Outside   TRI-PREVIFEM 0.18/0.215/0.25 MG-35 MCG tablet TAKE 1 TABLET BY MOUTH DAILY 8/8/18 4/28/23  Ana Monsalve PA   albuterol 108 (90 BASE) MCG/ACT inhaler Inhale 2 puffs into the lungs every 4 hours as needed for Shortness of Breath or Wheezing. 7/1/16   Nirmal Rodriges, APNP   minocycline (MINOCIN,DYNACIN) 100 MG capsule Take 1 capsule by mouth 2 times daily. 7/1/16 4/28/23  AntelmoNirmal resendiz APNP   clindamycin (CLEOCIN-T) 1 % lotion  7/1/16 4/28/23  Nirmal Rodriges APNP   tazarotene (TAZORAC) 0.1 % gel  7/1/16 4/28/23  Nirmal Rodriges, APNP       ALLERGIES:  No Known Allergies  Social History     Tobacco Use   • Smoking status: Never   • Smokeless tobacco: Never   Vaping Use   • Vaping status: never used   Substance Use Topics   • Alcohol use: Not Currently     History reviewed. No pertinent family history.    Review of Systems  Constitutional:  Negative for fever, chills, diaphoresis, activity change, appetite change, fatigue and unexpected weight change.  HENT:  Negative for hearing loss, nosebleeds, congestion and neck pain.  Eyes:  Negative for photophobia and visual disturbance.  Respiratory:  Negative for apnea, cough, choking, chest tightness, shortness of breath, wheezing and stridor.  Cardiovascular:  Negative for chest pain, palpitations, lightheadedness, near-syncope, syncope or leg swelling.  Gastrointestinal:  Negative for nausea, vomiting, abdominal pain, abdominal distention, diarrhea, constipation, blood in stool, throwing up blood and anal bleeding.  Genitourinary:  Negative for dysuria, frequency, hematuria and difficulty urinating.  Musculoskeletal:  Negative for myalgias, back pain, joint swelling, arthralgias and gait problem.  Skin:  Negative for color change, pallor, rash and wound.  Neurological:  Negative for dizziness, vision changes, seizures, numbness, new motor or sensory deficits or headaches.  Hematological:  Negative for adenopathy.  Does not bruise/bleed  easily.  Psychiatric/Behavioral:  Negative for hallucinations, behavioral problems, confusion, sleep disturbance, dysphoric mood, decreased concentration and agitation.    Objective:    Vitals Last Value 24-Hour Range   Temperature 98.6 °F (37 °C) (04/28/23 0400) Temp  Min: 97.7 °F (36.5 °C)  Max: 98.6 °F (37 °C)   Pulse 93 (04/28/23 0706) Pulse  Min: 51  Max: 168   Respiratory (!) 25 (04/28/23 0706) Resp  Min: 10  Max: 43   Non-Invasive  Blood Pressure 100/55 (04/28/23 0705) BP  Min: 61/31  Max: 215/141   Arterial  Blood Pressure   No data recorded   Pulse Oximetry 92 % (04/28/23 0706) SpO2  Min: 61 %  Max: 100 %     Vitals Today Admission   Weight 71.4 kg (157 lb 6.5 oz) (04/28/23 0500) Weight: 71.4 kg (157 lb 6.5 oz) (04/28/23 0500)     Weight over the past 48 hours:  Patient Vitals for the past 48 hrs:   Weight   04/28/23 0500 71.4 kg (157 lb 6.5 oz)        Intake/Output:  I/O last 3 completed shifts:  In: 5305.6 [I.V.:4305.6; IV Piggyback:1000]  Out: 0     Intake/Output Summary (Last 24 hours) at 4/28/2023 0826  Last data filed at 4/28/2023 0600  Gross per 24 hour   Intake 5305.56 ml   Output 0 ml   Net 5305.56 ml       Physical Exam  Constitutional:  In icu, ill appearing   HENT:  No obvious deformity.  Head:  Normocephalic and atraumatic.  Eyes:  Conjunctivae are normal.  Pupils are equal, round.  No scleral pallor, icterus or cyanosis.  Neck:  Normal range of motion.  Neck supple.  No JVD (jugular venous distention) present.  Carotid bruit is not present.  No tracheal deviation present.  No thyromegaly present.  Cardiovascular:  Normal rate, regular rhythm and intact distal pulses.  Exam reveals no gallop and no friction rub. No murmur heard.   Pulmonary/Chest:    No wheezes.  No rales.  Diminished   Abdominal:  Soft.  Bowel sounds are normal.  No distention and no mass.  There is no tenderness.  There is no rebound and no guarding.  Musculoskeletal:  Normal range of motion.  No edema and no  tenderness.  Lymphadenopathy:  No cervical adenopathy.  Neurological:  Alert and oriented to person, place, and time.  Skin:  Skin is warm and dry.  No rash noted.  Not diaphoretic.  No erythema.  No pallor.  Psychiatric:  Normal mood and affect.  Behavior is normal.  Judgment and thought content normal.    Laboratory Results:  Lab Results   Component Value Date    SODIUM 138 04/27/2023    POTASSIUM 3.1 (L) 04/27/2023    BUN 10 04/27/2023    CREATININE 0.70 04/27/2023    WBC 13.3 (H) 04/27/2023    HCT 35.2 (L) 04/27/2023    HGB 11.2 (L) 04/27/2023    GLUCOSE 140 (H) 04/27/2023    MG 1.6 (L) 04/27/2023    DDIMER 0.87 (H) 04/27/2023     Echo 4/28/23  Final Impressions  Dilated Cardiomyopathy. LVEF:31%. There is global hypokinesia with mid ventricular akinesia which can be seen in stress induced  midventricular variant Takotsubo cardiomyopathy, provided coronary disease is ruled out.  Normal LV filling pressures.  No prior studies.    Assessment:  25 year old female without significant pmh , presented 3/27/23 with week hx of URI sx, fevers, weakness, passed out today at home. EMS found BP 60/40s. CXR and CT scan with multifocal pneumonia. She remained hypotensive and hypoxic in ED, requiring Levophed, vasopressin and epinephrine gtts and BiPap. She did receive 1 mg of Epi in ED, HR increased to 160s and BP 200s temporarily. Admitted to ICU with acute hypoxic respiratory failure, shock, pneumonia. Troponin was negative on presentation, subsequent 1,201. pBNP 2,192. WBC 13. Lactate 6.2 -> 4.9. EKGs reviewed, ST anterior lateral ST depression. Diffuse ST depression (was after epi 1 mg dose in ED). Subsequent EKG NSR, improved ST changes. Echo EF 31% global. She remains in ICU on BiPap, currently on vasopressin and Levophed.     Acute cardiomyopathy / elevated troponin   Echo EF 31% global, likely stress induced CMP with severe sepsis / pneumonia, hypotension, hypoxia    Troponin 1,201 - trend to peak   EKGs reviewed,  ischemic changes noted and have normalized on subsequent EKG  Continued treatment for pneumonia, vasopressor support as needed, IVF  Start asa, heparin drip     MARJORIE Domingo seen in conjunction with the attending cardiologist, Dr. Whelan    Patient seen and examined with Ms Julia Rooney NP. Agree with the history and physical, past medical history, surgical history, social history, and review of systems. Medication list, vitals, and labs reviewed. I have reviewed and edited the above note as needed. I have personaly formulated the clinical impression and recommendations as stated.      Thank you, Ravi Gaytan DO, for the opportunity to participate in her care. Please call me if any questions or concerns at [phone], any time of the day.     Joni Whelan MD, MS, [facility], [facility]  Interventional Cardiologist   Pager [phone]    Likely stress induced cardiomyopathy with low left sided filling pressures. Agree with IV hydration and Heparin for 48 hours. Tn has already peaked. Risk averse to angiography. Will follow along     room air

## 2023-05-02 ENCOUNTER — APPOINTMENT (OUTPATIENT)
Dept: INTERNAL MEDICINE | Facility: CLINIC | Age: 83
End: 2023-05-02
Payer: MEDICARE

## 2023-05-02 VITALS
HEART RATE: 72 BPM | OXYGEN SATURATION: 96 % | TEMPERATURE: 97.2 F | HEIGHT: 65 IN | DIASTOLIC BLOOD PRESSURE: 77 MMHG | SYSTOLIC BLOOD PRESSURE: 138 MMHG | WEIGHT: 185 LBS | BODY MASS INDEX: 30.82 KG/M2

## 2023-05-02 VITALS — DIASTOLIC BLOOD PRESSURE: 70 MMHG | SYSTOLIC BLOOD PRESSURE: 126 MMHG

## 2023-05-02 PROCEDURE — 36415 COLL VENOUS BLD VENIPUNCTURE: CPT

## 2023-05-02 PROCEDURE — 99214 OFFICE O/P EST MOD 30 MIN: CPT | Mod: 25

## 2023-05-02 NOTE — PHYSICAL EXAM
[Normal Sclera/Conjunctiva] : normal sclera/conjunctiva [Normal Outer Ear/Nose] : the outer ears and nose were normal in appearance [Normal] : affect was normal and insight and judgment were intact [de-identified] : Bilateral edema one plus

## 2023-05-02 NOTE — REVIEW OF SYSTEMS
[Joint Pain] : no joint pain [Joint Stiffness] : no joint stiffness [Muscle Pain] : no muscle pain [Muscle Weakness] : muscle weakness [Back Pain] : no back pain [Joint Swelling] : no joint swelling [Headache] : no headache [Dizziness] : no dizziness [Fainting] : no fainting [Confusion] : no confusion [Memory Loss] : no memory loss [Unsteady Walk] : ataxia [Negative] : Psychiatric

## 2023-05-02 NOTE — ASSESSMENT
[FreeTextEntry1] : Patient is a 82-year-old male with history of obesity, mild dysplastic syndrome, carotid artery disease, coronary artery disease, peripheral vascular disease, renal artery stenosis, hyperlipidemia, hypertension, history of atrial fibrillation, gout, venous insufficiency, who presents for follow-up\par \par 1. Mild dysplastic syndrome\par continue Procrit\par last hemoglobin A1c five days ago 11.6 follow-up with hematology\par \par 2. Chronic kidney disease\par less creatinine 2.36\par recheck basic metabolic panel and UA if possible\par follow-up with renal\par \par 3. Carotid artery disease/coronary disease/peripheral vascular disease\par continue aggressive cholesterol-lowering\par follow-up with cardiology\par continue aspirin 81 mg and cilostazol three times a day\par \par 4. Hyperlipidemia\par continue Lipitor 80 mg\par check lipid profile\par \par 5. Hypertension\par continue amlodipine 5 mg once a day and furosemide\par \par 6 gout\par continue allopurinol hundred milligrams two tabs daily check uric acid\par and colchicine as Monday Wednesday Saturday\par \par 7 history of prostate cancer\par continue lenalidomide follow-up with neurology\par \par 8 possible atrial fibrillation\par weight confirmation loop recorder follow-up cardiology\par \par 9 health maintenance CPE in one month\par

## 2023-05-02 NOTE — HISTORY OF PRESENT ILLNESS
[FreeTextEntry1] : Follow-up for multiple medical problems [de-identified] : The patient is an 82-year-old male with history of mild dysplastic syndrome on Procrit last hemoglobin 11.6, history of recurrent prostate cancer on lenalidomide, peripheral vascular disease, venous insufficiency renal artery stenosis status post stent, carotid artery disease, possible atrial fibrillation, hypercholesterolemia, topherus gout, chronic kidney disease stage III B, fatty liver disease, spinal issues who presents for follow-up. Patient feels weak unable to walk long distances denies chest pain, shortness of breath palpitation lightheadedness

## 2023-05-03 ENCOUNTER — LABORATORY RESULT (OUTPATIENT)
Age: 83
End: 2023-05-03

## 2023-05-03 LAB — URATE SERPL-MCNC: 7.1 MG/DL

## 2023-05-04 LAB
ALBUMIN SERPL ELPH-MCNC: 4.7 G/DL
ALP BLD-CCNC: 92 U/L
ALT SERPL-CCNC: 10 U/L
ANION GAP SERPL CALC-SCNC: 17 MMOL/L
APPEARANCE: CLEAR
AST SERPL-CCNC: 17 U/L
BACTERIA: ABNORMAL /HPF
BILIRUB SERPL-MCNC: 0.6 MG/DL
BILIRUBIN URINE: NEGATIVE
BLOOD URINE: NEGATIVE
BUN SERPL-MCNC: 36 MG/DL
CALCIUM SERPL-MCNC: 9.3 MG/DL
CAST: 1 /LPF
CHLORIDE SERPL-SCNC: 103 MMOL/L
CHOLEST SERPL-MCNC: 147 MG/DL
CO2 SERPL-SCNC: 22 MMOL/L
COLOR: NORMAL
CREAT SERPL-MCNC: 1.79 MG/DL
EGFR: 38 ML/MIN/1.73M2
EPITHELIAL CELLS: 1 /HPF
GLUCOSE QUALITATIVE U: NEGATIVE MG/DL
GLUCOSE SERPL-MCNC: 115 MG/DL
HDLC SERPL-MCNC: 32 MG/DL
KETONES URINE: NEGATIVE MG/DL
LDLC SERPL CALC-MCNC: 59 MG/DL
LEUKOCYTE ESTERASE URINE: ABNORMAL
MICROSCOPIC-UA: NORMAL
NITRITE URINE: NEGATIVE
NONHDLC SERPL-MCNC: 115 MG/DL
PH URINE: 6
POTASSIUM SERPL-SCNC: 4.8 MMOL/L
PROT SERPL-MCNC: 7.2 G/DL
PROTEIN URINE: 100 MG/DL
RED BLOOD CELLS URINE: 5 /HPF
REVIEW: NORMAL
SODIUM SERPL-SCNC: 142 MMOL/L
SPECIFIC GRAVITY URINE: 1.02
TRIGL SERPL-MCNC: 279 MG/DL
UROBILINOGEN URINE: 0.2 MG/DL
WHITE BLOOD CELLS URINE: 193 /HPF

## 2023-05-05 ENCOUNTER — LABORATORY RESULT (OUTPATIENT)
Age: 83
End: 2023-05-05

## 2023-05-08 ENCOUNTER — APPOINTMENT (OUTPATIENT)
Dept: HEMATOLOGY ONCOLOGY | Facility: CLINIC | Age: 83
End: 2023-05-08

## 2023-05-11 ENCOUNTER — NON-APPOINTMENT (OUTPATIENT)
Age: 83
End: 2023-05-11

## 2023-05-11 ENCOUNTER — LABORATORY RESULT (OUTPATIENT)
Age: 83
End: 2023-05-11

## 2023-05-12 ENCOUNTER — NON-APPOINTMENT (OUTPATIENT)
Age: 83
End: 2023-05-12

## 2023-05-16 ENCOUNTER — OUTPATIENT (OUTPATIENT)
Dept: OUTPATIENT SERVICES | Facility: HOSPITAL | Age: 83
LOS: 1 days | Discharge: HOME | End: 2023-05-16

## 2023-05-16 DIAGNOSIS — Z86.79 PERSONAL HISTORY OF OTHER DISEASES OF THE CIRCULATORY SYSTEM: Chronic | ICD-10-CM

## 2023-05-16 DIAGNOSIS — Z98.890 OTHER SPECIFIED POSTPROCEDURAL STATES: Chronic | ICD-10-CM

## 2023-05-16 DIAGNOSIS — Z98.49 CATARACT EXTRACTION STATUS, UNSPECIFIED EYE: Chronic | ICD-10-CM

## 2023-05-16 DIAGNOSIS — D64.9 ANEMIA, UNSPECIFIED: ICD-10-CM

## 2023-05-16 DIAGNOSIS — Z90.79 ACQUIRED ABSENCE OF OTHER GENITAL ORGAN(S): Chronic | ICD-10-CM

## 2023-05-17 ENCOUNTER — LABORATORY RESULT (OUTPATIENT)
Age: 83
End: 2023-05-17

## 2023-05-22 ENCOUNTER — RESULT REVIEW (OUTPATIENT)
Age: 83
End: 2023-05-22

## 2023-05-22 ENCOUNTER — RX RENEWAL (OUTPATIENT)
Age: 83
End: 2023-05-22

## 2023-05-22 ENCOUNTER — APPOINTMENT (OUTPATIENT)
Dept: HEMATOLOGY ONCOLOGY | Facility: CLINIC | Age: 83
End: 2023-05-22
Payer: MEDICARE

## 2023-05-22 VITALS
HEART RATE: 79 BPM | WEIGHT: 184.99 LBS | TEMPERATURE: 98.4 F | DIASTOLIC BLOOD PRESSURE: 72 MMHG | OXYGEN SATURATION: 99 % | RESPIRATION RATE: 16 BRPM | SYSTOLIC BLOOD PRESSURE: 115 MMHG | BODY MASS INDEX: 30.79 KG/M2

## 2023-05-22 LAB
ALBUMIN SERPL ELPH-MCNC: 4.6 G/DL
ALP BLD-CCNC: 97 U/L
ALT SERPL-CCNC: 10 U/L
ANION GAP SERPL CALC-SCNC: 11 MMOL/L
AST SERPL-CCNC: 12 U/L
BASOPHILS # BLD AUTO: 0.05 K/UL — SIGNIFICANT CHANGE UP (ref 0–0.2)
BASOPHILS NFR BLD AUTO: 0.7 % — SIGNIFICANT CHANGE UP (ref 0–2)
BILIRUB SERPL-MCNC: 0.6 MG/DL
BUN SERPL-MCNC: 40 MG/DL
CALCIUM SERPL-MCNC: 9.6 MG/DL
CHLORIDE SERPL-SCNC: 102 MMOL/L
CO2 SERPL-SCNC: 29 MMOL/L
CREAT SERPL-MCNC: 1.99 MG/DL
EGFR: 33 ML/MIN/1.73M2
EOSINOPHIL # BLD AUTO: 0.11 K/UL — SIGNIFICANT CHANGE UP (ref 0–0.5)
EOSINOPHIL NFR BLD AUTO: 1.6 % — SIGNIFICANT CHANGE UP (ref 0–6)
GLUCOSE SERPL-MCNC: 120 MG/DL
HCT VFR BLD CALC: 34.4 % — LOW (ref 39–50)
HGB BLD-MCNC: 11.6 G/DL — LOW (ref 13–17)
IMM GRANULOCYTES NFR BLD AUTO: 1.8 % — HIGH (ref 0–0.9)
LDH SERPL-CCNC: 191 U/L
LYMPHOCYTES # BLD AUTO: 1.68 K/UL — SIGNIFICANT CHANGE UP (ref 1–3.3)
LYMPHOCYTES # BLD AUTO: 25.1 % — SIGNIFICANT CHANGE UP (ref 13–44)
MCHC RBC-ENTMCNC: 33.7 G/DL — SIGNIFICANT CHANGE UP (ref 32–36)
MCHC RBC-ENTMCNC: 37.3 PG — HIGH (ref 27–34)
MCV RBC AUTO: 110.6 FL — HIGH (ref 80–100)
MONOCYTES # BLD AUTO: 0.58 K/UL — SIGNIFICANT CHANGE UP (ref 0–0.9)
MONOCYTES NFR BLD AUTO: 8.7 % — SIGNIFICANT CHANGE UP (ref 2–14)
NEUTROPHILS # BLD AUTO: 4.14 K/UL — SIGNIFICANT CHANGE UP (ref 1.8–7.4)
NEUTROPHILS NFR BLD AUTO: 62.1 % — SIGNIFICANT CHANGE UP (ref 43–77)
NRBC # BLD: 0 /100 WBCS — SIGNIFICANT CHANGE UP (ref 0–0)
PLATELET # BLD AUTO: 204 K/UL — SIGNIFICANT CHANGE UP (ref 150–400)
POTASSIUM SERPL-SCNC: 4.8 MMOL/L
PROT SERPL-MCNC: 7.2 G/DL
RBC # BLD: 3.11 M/UL — LOW (ref 4.2–5.8)
RBC # FLD: 14.7 % — HIGH (ref 10.3–14.5)
SODIUM SERPL-SCNC: 142 MMOL/L
URATE SERPL-MCNC: 7 MG/DL
WBC # BLD: 6.68 K/UL — SIGNIFICANT CHANGE UP (ref 3.8–10.5)
WBC # FLD AUTO: 6.68 K/UL — SIGNIFICANT CHANGE UP (ref 3.8–10.5)

## 2023-05-22 PROCEDURE — 99214 OFFICE O/P EST MOD 30 MIN: CPT

## 2023-05-23 ENCOUNTER — LABORATORY RESULT (OUTPATIENT)
Age: 83
End: 2023-05-23

## 2023-05-23 ENCOUNTER — APPOINTMENT (OUTPATIENT)
Dept: INTERNAL MEDICINE | Facility: CLINIC | Age: 83
End: 2023-05-23
Payer: MEDICARE

## 2023-05-23 VITALS
HEART RATE: 81 BPM | BODY MASS INDEX: 30.79 KG/M2 | OXYGEN SATURATION: 96 % | WEIGHT: 185 LBS | TEMPERATURE: 97.7 F | SYSTOLIC BLOOD PRESSURE: 128 MMHG | DIASTOLIC BLOOD PRESSURE: 79 MMHG

## 2023-05-23 DIAGNOSIS — G62.9 POLYNEUROPATHY, UNSPECIFIED: ICD-10-CM

## 2023-05-23 DIAGNOSIS — D63.1 PERSONAL HISTORY OF DISEASES OF THE BLOOD AND BLOOD-FORMING ORGANS AND CERTAIN DISORDERS INVOLVING THE IMMUNE MECHANISM: ICD-10-CM

## 2023-05-23 DIAGNOSIS — M54.16 RADICULOPATHY, LUMBAR REGION: ICD-10-CM

## 2023-05-23 DIAGNOSIS — Z86.2 PERSONAL HISTORY OF DISEASES OF THE BLOOD AND BLOOD-FORMING ORGANS AND CERTAIN DISORDERS INVOLVING THE IMMUNE MECHANISM: ICD-10-CM

## 2023-05-23 PROCEDURE — 99214 OFFICE O/P EST MOD 30 MIN: CPT

## 2023-05-23 NOTE — ASSESSMENT
[FreeTextEntry1] : 81 year-old Mr. MACARIO is seen in follow up for macrocytic anemia (initial evaluation: HGB ~9.5, MCV ~125), WBC and platelet counts are preserved. Patient now has some symptoms of anemia. 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 He was started on Revlimid in December but was unable to tolerate due to multiple side effects. Recent hospitalization for new onset afib + anemia. Course c/b acute gout flare, neutropenic fever, transaminitis, and JUAN RAMON on CKD. Since his discharge from the hospital, he has had two vagal episodes with afib RVR which is now being controlled by 25mg Metoprolol BID. Clinically, he is responding well to the Procrit injections with his Hgb ~10 and appears with more energy and strength. We will restart Revlimid 5mg daily with weekly CBC and CMP to monitor his blood counts as well as liver/kidney function. Continue Procrit injections weekly to maintain Hgb > 11. 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 along with primary bone marrow disorder \par - Bone marrow biopsy: MDS with 5q deletion (65% cells) and MDS-RS with SF3B1 (39% allele frequency) \par - Discussed and reviewed bone marrow findings with the patient  \par - Started on Revlimid 5 mg daily (recommended dose 10 mg) and held in January 2023 due to cytopenias req hospitalization\par - Started 20,000U Procrit injections since February with great response (Hgb 10+)\par - Continue 20,000U Procrit weekly injections (hold for Hgb >/= 11)\par - Restart Revlimid 5 mg daily \par - C/w trending CBC, CMP, LDH weekly home draws (order in)\par - Transfusion support per protocol \par - Check CBC, CMP, LDH, UA today\par - RTC in 1 month \par \par [ ] Afib, rate controlled \par - New onset afib\par - Likely multifactorial etiology of dehydration and anemia \par - [id number] score = 5\par - On 6.25mg of Carvedilol but held ASA and Plavix due to anemia\par - Evaluated by cards and started on Metoprolol \par - Now afib rate controlled\par \par [ ] Gout\par - C/w Allopurinol and Colchicine \par - Right toe without S&S of infection\par - Last uric acid (5/3/2023) 7.1 - continue Allopurinol\par \par [ ] Back pain \par - Last MRI in 2021 with lumbar spondylosis, with spinal canal narrowing \par - Left flank pain may be r/t the stenosis causing pinched nerve pains\par - Currently on Gabapentin 200mg daily\par - Increased to Gabapentin 300mg TID (renewed)\par - Repeat MRI with no contrast to evaluate back pain and refer to neuro (order in) \par \par Patient seen and examined with Dr. Mcmahon

## 2023-05-23 NOTE — HISTORY OF PRESENT ILLNESS
[FreeTextEntry8] : \par \par CC:  left sided back pain for the past week\par \par HPI the patient is a 82-year-old male with history of obesity, coronary disease, atrial fibrillation, ascending aortic dilatation, myelodysplastic syndrome, chronic kidney disease stage III B, lumbar radiculopathy with spinal stenosis, nonalcoholic fatty liver disease, hyperlipidemia peripheral vascular disease peripheral neuropathy who presents for acute back pain. Patient describes the pain as severe localized to the left side nonradiating no weakness pain happens mainly once start standing or bending improved with walking.\par \par Patient also notes persistent frequency of urination in spite of treatment for urinary tract infection\par \par Also complains of bilateral leg weakness

## 2023-05-23 NOTE — REVIEW OF SYSTEMS
[Negative] : Allergic/Immunologic [Chest Pain] : no chest pain [Palpitations] : no palpitations [Leg Claudication] : no intermittent leg claudication [Lower Ext Edema] : no lower extremity edema

## 2023-05-23 NOTE — ASSESSMENT
[FreeTextEntry1] : The patient is a 82-year-old male with history of coronary disease, dilated order, peripheral vascular disease, coronary artery disease, atrial fibrillation, obesity, hyperlipidemia hypertension lumbar disc disease lumbar stenosis, peripheral vascular disease, peripheral neuropathy gout, chronic kidney disease, bilateral carotid disease who presents for complaint of back pain frequency urination and weakness of legs\par \par 1.  back pain\par sounds most consistent with musculoskeletal deconditioning\par referral physical therapy Tylenol three times a day trial of short muscle relaxing 1\par \par . 2  frequency urination\par rule out check point-of-care UA with reflex to culture\par \par 3 Legs weakness\par continue physical therapy multifactorial from lumbar stenosis to peripheral vascular disease to peripheral neuropathy\par observe for now\par

## 2023-05-23 NOTE — PHYSICAL EXAM
[Ambulatory and capable of all self care but unable to carry out any work activities] : Status 2- Ambulatory and capable of all self care but unable to carry out any work activities. Up and about more than 50% of waking hours [Normal] : affect appropriate [de-identified] : afib rate controlled  [de-identified] : seen in wheelchair, when off the wheel chair unsteady gait but is able to walk from door to the wheelchair  [de-identified] : unsteady gait

## 2023-05-23 NOTE — RESULTS/DATA
[FreeTextEntry1] : 5/22/2023\par WBC and platelet WNL\par Hgb 11.1 \par .9\par CMP from 5/4- \par Cr 1.79, eGFR 38\par Pending CMP LDH UA today\par \par 3/24/2023\par WBC 6.28\par Hgb 10.2\par .7\par Platelet 224\par Last Cr (2/24/23) - 2.42\par Pending repeat CBC, CMP, LDH, UA\par \par 2/24/2023\par WBC 2.62\par Hgb 8.8\par Platelet 128\par Pending CMP, LDH, UA\par \par 1/26/2023\par Last Hgb 9.1, Cr 2.3 (12/16/2022) \par \par \par 12/16/2022\par HGB on 10/31/2022; 8.3, \par Bone marrow biopsy: \par Patient:   MASOOD MACARIO\par \par \par Accession:                             [id number]\par \par Collected Date/Time:                   10/31/2022 16:49 EDT\par Received Date/Time:                    10/31/2022 16:50 EDT\par \par Hematopathology Addendum Report - Auth (Verified)\par \par Hematopathology Addendum\par Additional immunohistochemical stains (block 1A: p53, ) show\par  increased  positive interstitial mast cells without aggregates. TP53\par  shows less than 1% bright positive cells.\par \par There is no change in the diagnosis.\par \par Verified by: Brooklynn Erazo\par (Electronic Signature)\par Reported on: 11/17/22 09:24 EST, [facility], [address] [address], [place] [address]\par Phone: [phone]   Fax: [phone]\par _________________________________________________________________\par \par Disclaimer\par Slide(s) with built in immunohistochemical study control(s) and negative\par  control associated with this case has/have been verified by the sign out\par  pathologist.\par \par For slide(s) without built in controls positive control slides has/have\par  been reviewed and approved by immunohistochemistry lab\par \par These immunohistochemical/ in-situ hybridization tests have been developed\par  and their performance characteristics determined by [facility], Department of Pathology, Division of Immunopathology,\par  [phone] [place] [address].  It has not been cleared\par  or approved by the U.S. Food and Drug Administration.  The FDA has\par  determined that such clearance or approval is not necessary.  This test\par  is used for clinical purposes.  The laboratory is certified under the\par  CLIA-88 as qualified to perform high complexity clinical testing.\par Hematopathology Addendum Report - Auth (Verified)\par \par Hematopathology Addendum\par       Comprehensive Hematopathology Report\par \par Final Diagnosis :\par 1, 2. 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 The bone marrow\par  shows hypercellularity with erythroid hyperplasia and increased ring\par  sideroblasts and dysplastic megakaryocytosis. Interstitial mast cells\par  are increased with normal morphology, few CD25 positive, negative CD30.\par  The findings show myelodysplastic syndrome with multilineage dysplasia\par  (hypolobulated megakaryocytes) and ring sideroblasts. Correlation with\par  cytogenetics, FISH, and somatic mutation analysis to evaluate for complex\par  karyotype, del(5q), -7, del(7q) SF3B1, TP53, other abnormalities is\par  done.\par Please note findings of an   abnormal male karyotype, 46,XY,del(5)(q15q33)\par [13]/46,XY[7],  a positive MDS FISH panel and [facility] Advanced NGS\par  Myeloid Report showing   Tier I: Variants of Strong Clinical Significance:\par  SF3B1 p.[id number] (VAF: 39%),   Tier II: Variants of Potential Clinical\par \par  Significance: DNMT3A p.?  (VAF: 4%), Tier III: Variants of Unknown\par  Clinical Significance:   KIT p.Igg775Ldc  (VAF: 50%) . Based on the\par  additional findings, the MDS classification (ICC) is MDS with del(5q) and\par  with WHO is MDS with low blasts and 5q deletion.\par \par Dr. Mcmahon was notified of the diagnosis on 11/07/2022.\par \par Morphology:\par Microscopic description:\par 1. Biopsy:   Sections of bone marrow biopsy and bone marrow fragments in\par  clot show hypercellularity (50-85% cellularity), erythroid predominance\par  with maturation and increased pronormoblasts, maturing and mature\par  myeloid elements, megakaryocytes at least normal in number with abnormal\par  morphology (increased hypolobulated/small forms), increased interstitial\par  mast cells without aggregates, and iron stores increased.\par \par 2. Aspirate:   Cellular spicules are present, adequate for interpretation.\par   Maturing and mature myeloid and erythroid elements are present with\par  erythroid predominance (M:E ratio (1.16:1).  Megakaryocytes appear at\par  least normal in number with small/hypolobulated morphology. Mast cells\par  are increased in the spicules (round and normally granular).\par \par Bone Marrow Aspirate Differential: (100 Cells).\par Type  % Normal*\par Blast  0% 0-3\par Neutrophil and\par    Precursors   47% 33-63\par Eosinophil  3% 1-5\par Basophil  0% 0-1\par Pronormoblast  5% 0-2\par Normoblast  43% 15-25\par Monocyte  0% 0-2\par Lymphocyte  7% 10-15\par Plasma cell  0% 0-1\par   *Adult Range\par \par Comment\par Iron stain (examined to evaluate for iron stores; see microscopic\par  description) and Giemsa stain (shows appropriate staining pattern) are\par  performed and evaluated on block(s): 1A, 1B\par \par Ancillary Studies:\par Bone marrow aspirate iron stain:   Iron stores are increased; numerous ring\par  sideroblasts are present (greater than 15%).\par \par Flow cytometry:   The myeloid immunophenotypic findings show decreased\par  myeloid granularity, no increase in myeloid immaturity (myeloblasts,\par  0.32% of cells, with normal immunophenotype), normal myeloid antigen\par  maturation pattern, few mast cells, and the presence of hematogones\par  (which are reported to be low in myelodysplasia).\par The lymphocyte immunophenotypic findings show no diagnostic abnormalities.\par \par Immunohistochemical stains   (block 1A: CD34, , myeloperoxidase,\par  CD71, E-cadherin, factor VIII, CD15, CD61, CD25, CD30, p53) show no\par  increase in CD34 positive cells (less than 3%), erythroid predominance\par  (positive with CD71), with clusters of pronormoblasts (positive with\par  E-cadherin); diminished myeloid elements with maturation (positive\par \par  with myeloperoxidase and CD15), and increased megakaryocyte number with\par  dysplastic, small/hypolobulated morphology (positive with factor VIII,\par  CD61). CD30 is negative in mast cells; a few show dim CD25 positivity.\par \par Cytogenetics:  [id number]\par Date Collected:  10/31/2022\par Result:  Abnormal male karyotype\par Karyotype: 46,XY,del(5)(q15q33)[13]/46,XY[7]\par \par Fluorescence in situ Hybridization (FISH):    [id number]\par Result: ABNORMAL FISH MDS PANEL - 5q deletion detected (65%)\par Probe(s) and Location(s):   R2D183/ D5S23 (5p15.2), EGR1 (5q31), D7Z1\par  (7p11.1-q11.1), H2N212 (7q31), CEP-8/D8Z2   ? (8p11.1-q11.1), V66Y376\par  (20q12)\par ISCN Nomenclature:  nuc clif(K1L100/V4K38i7,EGR1x1)[130/200],\par  (D7Z1,W8O042)x2[200], (D8Z2,B11J195)x2[197/200], (TP53x2)[197/200]\par \par Molecular Analyses:\par [facility] Advanced NGS Myeloid Report\par Specimen ID:  [id number]\par Date Collected:  10/31/2022\par Specimen Source:  Bone Marrow\par \par RESULT SUMMARY:  ABNORMAL\par DETECTED GENOMIC ALTERATIONS:\par Tier I: Variants of Strong Clinical Significance\par SF3B1 p.Dmo987Mfs\par Tier II: Variants of Potential Clinical Significance\par DNMT3A p.?\par Tier III: Variants of Unknown Clinical Significance\par KIT p.Ewn682Jbj\par \par PERTINENT NEGATIVE RESULTS:\par The following genes are NEGATIVE for clinically relevant mutations.\par  Mutational hotspots and surrounding exonic regions were interrogated for\par  DNA level point mutations and indels (fusions not assayed).\par ABL1, ANKRD26, ASXL1, ATRX, BCOR, BCORL1, BRAF, CALR, CBL, CCND2, CDKN2A,\par  CEBPA, CSF3R, CUX1, DDX41, ETNK1, ETV6, EZH2, FBXW7, FLT3, GATA2, HRAS,\par  IDH1, IDH2, JAK2, KDM6A, KMT2A, KRAS, MAP2K1, MPL, MYD88, NF1, NPM1,\par  NRAS, PDGFRA, PHF6, PTEN, PTPN11, RUNX1, SETBP1, SRSF2, STAG2, TET2,\par  TP53, U2AF1, WT1, ZRSR2\par TECHNICAL SUMMARY :\par DNMT3A\par p.?\par c.2174-1G>A\par 4% allele frequency\par Exon 19\par [id number]\par \par SF3B1\par p.Akt692Kvg\par c.1998G>C\par 39% allele frequency\par Exon 14\par [id number]\par \par \par KIT\par p.Iae495Nly\par c.1879C>T\par 50% allele frequency\par Exon 12\par [id number]\par \par Clinical History/Data  :\par History of macrocytic anemia with CKD rule out primary bone marrow\par  disorder\par \par CBC, 10/31/2022\par \par Test Code       Result         Reference\par                  Range\par WBC             5.75 K/uL      3.80 - 10.50\par RBC             2.34 M/uL L    4.20 - 5.80\par HGB             8.3 g/dL L     13.0 - 17.0\par HCT             25.4 % L       39.0 - 50.0\par MCV             108.5 fl H     80.0 - 100.0\par MCH             35.5 pg H      27.0 - 34.0\par MCHC            32.7 g/dL      32.0 - 36.0\par RDW             18.5 % H       10.3 - 14.5\par PLT             236 K/uL       150 - 400\par Auto NRBC       0 /100 WBCs    0 - 0\par NEUT%           63.5 %         43.0 - 77.0\par \par NEUT#           3.65 K/uL      1.80 - 7.40\par LYMPH%          20.5 %         13.0 - 44.0\par LYMPH#          1.18 K/uL      1.00 - 3.30\par MONO%           7.0 %          2.0 - 14.0\par MONO#           0.40 K/uL      0.00 - 0.90\par EOS%            3.3 %          0.0 - 6.0\par EOS#            0.19 K/uL      0.00 - 0.50\par BASO%           1.4 %          0.0 - 2.0\par BASO#           0.08 K/uL      0.00 - 0.20\par BUN             28 mg/dL H     7 - 23\par Creatinine      2.32 mg/dL H   0.50 - 1.30\par \par Verified by: Brooklynn Erazo\par (Electronic Signature)\par Reported on: 11/09/22 16:40 EST, [facility], [address]  [facility]. [address], [place] [address]\par Phone: [phone]   Fax: [phone]\par \par 10/27/2022\par (Labs on 10/18/2022): Hgb stable at 9.0, normal WBC, platelets\par Creatinine 2.32\par eGFR 28\par \par \par 5/12/2022\par Available labs reviewed. \par Macrocytic anemia, no etiology could be determined. \par

## 2023-05-23 NOTE — HISTORY OF PRESENT ILLNESS
[de-identified] : CHART REVIEW: 81 year-old Mr. MACARIO is seen in consult for macrocytic  anemia (Hgb 9.5, MCV ~125). Patient has multiple medical conditions including stage-3 CKD. He has no symptoms of anemia and has no complaints.  [de-identified] : 05/12/22:\par 81 year-old Mr. MACARIO is seen in consult for macrocytic  anemia (Hgb 9.5, MCV ~125). Patient has multiple medical conditions including stage-3 CKD. He has no symptoms of anemia and has no complaints. \par \par 10/27/22:\par Patient presenting today for follow up for macrocytic anemia. Overall feels well. Endorses some fatigue and shortness of breath with activity. Denies headache, dizziness, Ambulates with walker. He is being followed by his nephrologist who manages his hypertension. Endorses taking diuretics every other day.  \par \par 12/16/2022\par Patient returns for a follow up. He endorses no change in his health status. 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 Patient has some symptoms of fatigue and tiredness. \par \par 1/26/2023\par Patient returns for a follow up. He started taking Revlimid on 12/30/2022. About 2 weeks later he started developing side effects like-- loss  of appetite, body ache, constipation, pins and needle sensation. He also appears to have developed Jaundice. \par \par 2/24/2023\par Patient seen in follow up, accompanied by Preeti ceja, at bedside. He had a recent hospitalization from 1/27 - 2/7 for new onset afib that was detected in the treatment room prior to his blood transfusion appointment. He was found to be in afib, anemic (Hgb 6.3) requiring 4 PRBCs throughout his stay. His course was c/b acute gout flare, neutropenic fever, transaminitis, and JUAN RAMON on CKD. 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 Had an episode of neutropenic fever started on Cefepime but stopped as per ID, most likely due to gout flare. UA + but asymptomatic, not treated. Bld cx neg x2. Syncopal episode while being on the commode yesterday 2/24. As per Preeti, EKG revealed rate controlled afib. Today, his HR was 127 in office. Denies dizziness, SOB, chest pain, palpitations. As per pt, was on a holter monitor that was submitted yesterday. He is f/u with Dr. Reddy (cards). He also have been having a dry cough for few days. + chills. No fever, body aches, night sweats. No sick contacts. Working with PT. Still feels weak, ambulating via wheelchair. Appetite is okay. Weight is stable. \par \par 3/24/2023\par Patient seen in follow up. Accompanied by Preeti ceja at bedside. He has been responding well to 20k U Procrit weekly injections. Last Hgb 10.2, BP stable 111/58. Since his last visit, has been evaluated by cards for his afib (now rate controlled) currently on Metoprolol 25mg BID. He reports feeling much better since last encounter - he is now able to ambulate longer distances with a walker. Endorses unsteady gait (vertigo). Appetite is good, weight is stable. \par \par 5/22/2023\par Patient seen in follow up. Accompanied by aide and daughter at bedside. Had a recent UTI, treated with Amoxicillin. He'll be following up with his PCP tomorrow. He endorses left flank pain that started after his UTI that is hindering him from ambulating. Describes the pain to be "shocking" and sharp in nature, pain reproduced by movements. His last MRI was in 2021 which revealed lumbar spondylosis with mild to moderate spinal canal narrowing. + peripheral neuropathy of his right lower ext. Currently on Gabapentin 200mg daily. His Hgb has been stable since April 27th. Last Hgb 11.1 last week. Otherwise, no complaints. Appetite is good and weight is stable.

## 2023-05-23 NOTE — PHYSICAL EXAM
[Normal Sclera/Conjunctiva] : normal sclera/conjunctiva [Normal Outer Ear/Nose] : the outer ears and nose were normal in appearance [Normal] : soft, non-tender, non-distended, no masses palpated, no HSM and normal bowel sounds [No CVA Tenderness] : no CVA  tenderness [No Spinal Tenderness] : no spinal tenderness [de-identified] : No CVA tenderness

## 2023-05-24 ENCOUNTER — LABORATORY RESULT (OUTPATIENT)
Age: 83
End: 2023-05-24

## 2023-05-25 ENCOUNTER — LABORATORY RESULT (OUTPATIENT)
Age: 83
End: 2023-05-25

## 2023-05-30 LAB
APPEARANCE: CLEAR
BILIRUBIN URINE: NEGATIVE
BLOOD URINE: NEGATIVE
COLOR: YELLOW
GLUCOSE QUALITATIVE U: NEGATIVE MG/DL
KETONES URINE: NEGATIVE MG/DL
LEUKOCYTE ESTERASE URINE: ABNORMAL
NITRITE URINE: POSITIVE
PH URINE: 5.5
PROTEIN URINE: 30 MG/DL
SPECIFIC GRAVITY URINE: 1.01
UROBILINOGEN URINE: 0.2 MG/DL

## 2023-05-31 ENCOUNTER — LABORATORY RESULT (OUTPATIENT)
Age: 83
End: 2023-05-31

## 2023-06-02 ENCOUNTER — NON-APPOINTMENT (OUTPATIENT)
Age: 83
End: 2023-06-02

## 2023-06-06 ENCOUNTER — LABORATORY RESULT (OUTPATIENT)
Age: 83
End: 2023-06-06

## 2023-06-07 ENCOUNTER — LABORATORY RESULT (OUTPATIENT)
Age: 83
End: 2023-06-07

## 2023-06-12 ENCOUNTER — RX RENEWAL (OUTPATIENT)
Age: 83
End: 2023-06-12

## 2023-06-14 ENCOUNTER — LABORATORY RESULT (OUTPATIENT)
Age: 83
End: 2023-06-14

## 2023-06-15 ENCOUNTER — LABORATORY RESULT (OUTPATIENT)
Age: 83
End: 2023-06-15

## 2023-06-20 ENCOUNTER — LABORATORY RESULT (OUTPATIENT)
Age: 83
End: 2023-06-20

## 2023-06-20 ENCOUNTER — OUTPATIENT (OUTPATIENT)
Dept: OUTPATIENT SERVICES | Facility: HOSPITAL | Age: 83
LOS: 1 days | End: 2023-06-20
Payer: MEDICARE

## 2023-06-20 ENCOUNTER — APPOINTMENT (OUTPATIENT)
Dept: MRI IMAGING | Facility: IMAGING CENTER | Age: 83
End: 2023-06-20
Payer: MEDICARE

## 2023-06-20 DIAGNOSIS — Z98.890 OTHER SPECIFIED POSTPROCEDURAL STATES: Chronic | ICD-10-CM

## 2023-06-20 DIAGNOSIS — Z90.79 ACQUIRED ABSENCE OF OTHER GENITAL ORGAN(S): Chronic | ICD-10-CM

## 2023-06-20 DIAGNOSIS — Z98.49 CATARACT EXTRACTION STATUS, UNSPECIFIED EYE: Chronic | ICD-10-CM

## 2023-06-20 DIAGNOSIS — Z86.79 PERSONAL HISTORY OF OTHER DISEASES OF THE CIRCULATORY SYSTEM: Chronic | ICD-10-CM

## 2023-06-20 DIAGNOSIS — M54.9 DORSALGIA, UNSPECIFIED: ICD-10-CM

## 2023-06-20 PROCEDURE — 72148 MRI LUMBAR SPINE W/O DYE: CPT

## 2023-06-20 PROCEDURE — 72148 MRI LUMBAR SPINE W/O DYE: CPT | Mod: 26

## 2023-06-23 NOTE — DISCHARGE NOTE PROVIDER - PROVIDER TOKENS
PROVIDER:[TOKEN:[[id number]],FOLLOWUP:[1 week]],PROVIDER:[TOKEN:[[id number]],FOLLOWUP:[1 week]],PROVIDER:[TOKEN:[[id number]],FOLLOWUP:[1 week]] High Dose Vitamin A Counseling: Side effects reviewed, pt to contact office should one occur.

## 2023-06-27 ENCOUNTER — TRANSCRIPTION ENCOUNTER (OUTPATIENT)
Age: 83
End: 2023-06-27

## 2023-06-28 ENCOUNTER — TRANSCRIPTION ENCOUNTER (OUTPATIENT)
Age: 83
End: 2023-06-28

## 2023-06-28 ENCOUNTER — LABORATORY RESULT (OUTPATIENT)
Age: 83
End: 2023-06-28

## 2023-07-05 ENCOUNTER — LABORATORY RESULT (OUTPATIENT)
Age: 83
End: 2023-07-05

## 2023-07-05 ENCOUNTER — APPOINTMENT (OUTPATIENT)
Dept: HEMATOLOGY ONCOLOGY | Facility: CLINIC | Age: 83
End: 2023-07-05

## 2023-07-05 ENCOUNTER — RESULT REVIEW (OUTPATIENT)
Age: 83
End: 2023-07-05

## 2023-07-05 ENCOUNTER — APPOINTMENT (OUTPATIENT)
Dept: HEMATOLOGY ONCOLOGY | Facility: CLINIC | Age: 83
End: 2023-07-05
Payer: MEDICARE

## 2023-07-05 VITALS
DIASTOLIC BLOOD PRESSURE: 64 MMHG | WEIGHT: 185.19 LBS | RESPIRATION RATE: 16 BRPM | OXYGEN SATURATION: 97 % | TEMPERATURE: 98.2 F | BODY MASS INDEX: 30.82 KG/M2 | HEART RATE: 109 BPM | SYSTOLIC BLOOD PRESSURE: 124 MMHG

## 2023-07-05 DIAGNOSIS — E87.6 HYPOKALEMIA: ICD-10-CM

## 2023-07-05 LAB
ALBUMIN SERPL ELPH-MCNC: 4.2 G/DL
ALP BLD-CCNC: 105 U/L
ALT SERPL-CCNC: 14 U/L
ANION GAP SERPL CALC-SCNC: 14 MMOL/L
ANISOCYTOSIS BLD QL: SLIGHT — SIGNIFICANT CHANGE UP
AST SERPL-CCNC: 22 U/L
BASOPHILS # BLD AUTO: 0.04 K/UL — SIGNIFICANT CHANGE UP (ref 0–0.2)
BASOPHILS NFR BLD AUTO: 1 % — SIGNIFICANT CHANGE UP (ref 0–2)
BILIRUB SERPL-MCNC: 0.7 MG/DL
BUN SERPL-MCNC: 23 MG/DL
CALCIUM SERPL-MCNC: 8.9 MG/DL
CHLORIDE SERPL-SCNC: 99 MMOL/L
CO2 SERPL-SCNC: 27 MMOL/L
CREAT SERPL-MCNC: 2.3 MG/DL
DACRYOCYTES BLD QL SMEAR: SLIGHT — SIGNIFICANT CHANGE UP
EGFR: 28 ML/MIN/1.73M2
EOSINOPHIL # BLD AUTO: 0.24 K/UL — SIGNIFICANT CHANGE UP (ref 0–0.5)
EOSINOPHIL NFR BLD AUTO: 6 % — SIGNIFICANT CHANGE UP (ref 0–6)
GLUCOSE SERPL-MCNC: 146 MG/DL
HCT VFR BLD CALC: 28.3 % — LOW (ref 39–50)
HGB BLD-MCNC: 9.9 G/DL — LOW (ref 13–17)
LDH SERPL-CCNC: 219 U/L
LYMPHOCYTES # BLD AUTO: 0.99 K/UL — LOW (ref 1–3.3)
LYMPHOCYTES # BLD AUTO: 25 % — SIGNIFICANT CHANGE UP (ref 13–44)
MACROCYTES BLD QL: SLIGHT — SIGNIFICANT CHANGE UP
MCHC RBC-ENTMCNC: 35 G/DL — SIGNIFICANT CHANGE UP (ref 32–36)
MCHC RBC-ENTMCNC: 36.5 PG — HIGH (ref 27–34)
MCV RBC AUTO: 104.4 FL — HIGH (ref 80–100)
METAMYELOCYTES # FLD: 1 % — HIGH (ref 0–0)
MONOCYTES # BLD AUTO: 0.32 K/UL — SIGNIFICANT CHANGE UP (ref 0–0.9)
MONOCYTES NFR BLD AUTO: 8 % — SIGNIFICANT CHANGE UP (ref 2–14)
MYELOCYTES NFR BLD: 4 % — HIGH (ref 0–0)
NEUTROPHILS # BLD AUTO: 2.17 K/UL — SIGNIFICANT CHANGE UP (ref 1.8–7.4)
NEUTROPHILS NFR BLD AUTO: 55 % — SIGNIFICANT CHANGE UP (ref 43–77)
NRBC # BLD: 0 /100 — SIGNIFICANT CHANGE UP (ref 0–0)
NRBC # BLD: SIGNIFICANT CHANGE UP /100 WBCS (ref 0–0)
PLAT MORPH BLD: NORMAL — SIGNIFICANT CHANGE UP
PLATELET # BLD AUTO: 158 K/UL — SIGNIFICANT CHANGE UP (ref 150–400)
POIKILOCYTOSIS BLD QL AUTO: SLIGHT — SIGNIFICANT CHANGE UP
POTASSIUM SERPL-SCNC: 3.6 MMOL/L
PROT SERPL-MCNC: 6.5 G/DL
RBC # BLD: 2.71 M/UL — LOW (ref 4.2–5.8)
RBC # FLD: 15.6 % — HIGH (ref 10.3–14.5)
RBC BLD AUTO: ABNORMAL
SODIUM SERPL-SCNC: 140 MMOL/L
WBC # BLD: 3.94 K/UL — SIGNIFICANT CHANGE UP (ref 3.8–10.5)
WBC # FLD AUTO: 3.94 K/UL — SIGNIFICANT CHANGE UP (ref 3.8–10.5)

## 2023-07-05 PROCEDURE — 99213 OFFICE O/P EST LOW 20 MIN: CPT

## 2023-07-06 ENCOUNTER — LABORATORY RESULT (OUTPATIENT)
Age: 83
End: 2023-07-06

## 2023-07-07 NOTE — PHYSICAL EXAM
[Ambulatory and capable of all self care but unable to carry out any work activities] : Status 2- Ambulatory and capable of all self care but unable to carry out any work activities. Up and about more than 50% of waking hours [Normal] : affect appropriate [de-identified] : seen in wheelchair [de-identified] : afib rate controlled  [de-identified] : unsteady gait, 2 ppl assist

## 2023-07-07 NOTE — ASSESSMENT
[FreeTextEntry1] : 81 year-old Mr. MACARIO is seen in follow up for macrocytic anemia (initial evaluation: HGB ~9.5, MCV ~125), WBC and platelet counts are preserved. Patient now has some symptoms of anemia. 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 He was started on Revlimid in December but was unable to tolerate due to multiple side effects. Recent hospitalization for new onset afib + anemia. Course c/b acute gout flare, neutropenic fever, transaminitis, and JUAN RAMON on CKD. Since his discharge from the hospital, he has had two vagal episodes with afib RVR which is now being controlled by 25mg Metoprolol BID. Clinically, he is responding well to the Procrit injections with his Hgb ~10 and appears with more energy and strength. Restarted Revlimid 5mg daily, tolerating well. Weekly CBC and CMP to monitor his blood counts as well as liver/kidney function. Continue Procrit injections weekly to maintain Hgb > 11. 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 along with primary bone marrow disorder \par - Bone marrow biopsy: MDS with 5q deletion (65% cells) and MDS-RS with SF3B1 (39% allele frequency) \par - Discussed and reviewed bone marrow findings with the patient  \par - Started on Revlimid 5 mg daily (recommended dose 10 mg) and held in January 2023 due to cytopenias req hospitalization\par - Started 20,000U Procrit injections since February with great response (Hgb 10+)\par - Continue 20,000U Procrit weekly injections (hold for Hgb >/= 11)\par - Restarted Revlimid 5 mg daily (June 2023), tolerating well\par - C/w trending CBC, CMP, LDH weekly home draws (order in)\par - Transfusion support per protocol \par - Check CBC, CMP, LDH, UA today\par \par [ ] Afib, rate controlled\par - New onset afib\par - Likely multifactorial etiology of dehydration and anemia \par - [id number] score = 5\par - On 6.25mg of Carvedilol but held ASA and Plavix due to anemia\par - Evaluated by cards and started on Metoprolol \par - Now afib rate controlled\par \par [ ] Gout\par - C/w Allopurinol and Colchicine \par - Right toe without S&S of infection\par - Continue Allopurinol\par - Continue f/u with rheum \par \par [ ] Back pain \par - Last MRI in 2021 with lumbar spondylosis, with spinal canal narrowing \par - Left flank pain may be r/t the stenosis causing pinched nerve pains\par - Currently on Gabapentin 200mg daily\par - Increased to Gabapentin 300mg TID \par - Repeat MRI with no contrast (6/20) - chronic lumbar spondylosis\par - Recommended spine specialist - pt deferred at this time and will f/u w/ rheum\par \par [ ] Hypokalemia\par - K of 3.3 on 6/28 \par - Rx sent for 20meq klor x2 to be taken every other day reevaluate next week\par - Caution with potassium replenishment as pt has CKD  \par \par Case and management discussed with Dr. Mcmahon

## 2023-07-07 NOTE — RESULTS/DATA
[FreeTextEntry1] : 7/2/2023\par Hgb 9.9, .4\par WBC platelet normal \par Cr 2.3 eGFR 28 \par Hypokalemic on 6/28 3.3\par \par 5/22/2023\par WBC and platelet WNL\par Hgb 11.1 \par .9\par CMP from 5/4- \par Cr 1.79, eGFR 38\par Pending CMP LDH UA today\par \par 3/24/2023\par WBC 6.28\par Hgb 10.2\par .7\par Platelet 224\par Last Cr (2/24/23) - 2.42\par Pending repeat CBC, CMP, LDH, UA\par \par 2/24/2023\par WBC 2.62\par Hgb 8.8\par Platelet 128\par Pending CMP, LDH, UA\par \par 1/26/2023\par Last Hgb 9.1, Cr 2.3 (12/16/2022) \par \par \par 12/16/2022\par HGB on 10/31/2022; 8.3, \par Bone marrow biopsy: \par Patient:   MASOOD MACARIO\par \par \par Accession:                             [id number]\par \par Collected Date/Time:                   10/31/2022 16:49 EDT\par Received Date/Time:                    10/31/2022 16:50 EDT\par \par Hematopathology Addendum Report - Auth (Verified)\par \par Hematopathology Addendum\par Additional immunohistochemical stains (block 1A: p53, ) show\par  increased  positive interstitial mast cells without aggregates. TP53\par  shows less than 1% bright positive cells.\par \par There is no change in the diagnosis.\par \par Verified by: Brooklynn Erazo\par (Electronic Signature)\par Reported on: 11/17/22 09:24 EST, [facility] [address], [address]\par  [address] [address]\par Phone: [phone]   Fax: [phone]\par _________________________________________________________________\par \par Disclaimer\par Slide(s) with built in immunohistochemical study control(s) and negative\par  control associated with this case has/have been verified by the sign out\par  pathologist.\par \par For slide(s) without built in controls positive control slides has/have\par  been reviewed and approved by immunohistochemistry lab\par \par These immunohistochemical/ in-situ hybridization tests have been developed\par  and their performance characteristics determined by [facility], Department of Pathology, Division of Immunopathology,\par  [address] [place] [address].  It has not been cleared\par  or approved by the U.S. Food and Drug Administration.  The FDA has\par  determined that such clearance or approval is not necessary.  This test\par  is used for clinical purposes.  The laboratory is certified under the\par  CLIA-88 as qualified to perform high complexity clinical testing.\par Hematopathology Addendum Report - Auth (Verified)\par \par Hematopathology Addendum\par       Comprehensive Hematopathology Report\par \par Final Diagnosis :\par 1, 2. 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 The bone marrow\par  shows hypercellularity with erythroid hyperplasia and increased ring\par  sideroblasts and dysplastic megakaryocytosis. Interstitial mast cells\par  are increased with normal morphology, few CD25 positive, negative CD30.\par  The findings show myelodysplastic syndrome with multilineage dysplasia\par  (hypolobulated megakaryocytes) and ring sideroblasts. Correlation with\par  cytogenetics, FISH, and somatic mutation analysis to evaluate for complex\par  karyotype, del(5q), -7, del(7q) SF3B1, TP53, other abnormalities is\par  done.\par Please note findings of an   abnormal male karyotype, 46,XY,del(5)(q15q33)\par [13]/46,XY[7],  a positive MDS FISH panel and [facility] Advanced NGS\par  Myeloid Report showing   Tier I: Variants of Strong Clinical Significance:\par  SF3B1 p.[id number] (VAF: 39%),   Tier II: Variants of Potential Clinical\par \par  Significance: DNMT3A p.?  (VAF: 4%), Tier III: Variants of Unknown\par  Clinical Significance:   KIT p.Jzt387Ccd  (VAF: 50%) . Based on the\par  additional findings, the MDS classification (ICC) is MDS with del(5q) and\par  with WHO is MDS with low blasts and 5q deletion.\par \par Dr. Mcmahon was notified of the diagnosis on 11/07/2022.\par \par Morphology:\par Microscopic description:\par 1. Biopsy:   Sections of bone marrow biopsy and bone marrow fragments in\par  clot show hypercellularity (50-85% cellularity), erythroid predominance\par  with maturation and increased pronormoblasts, maturing and mature\par  myeloid elements, megakaryocytes at least normal in number with abnormal\par  morphology (increased hypolobulated/small forms), increased interstitial\par  mast cells without aggregates, and iron stores increased.\par \par 2. Aspirate:   Cellular spicules are present, adequate for interpretation.\par   Maturing and mature myeloid and erythroid elements are present with\par  erythroid predominance (M:E ratio (1.16:1).  Megakaryocytes appear at\par  least normal in number with small/hypolobulated morphology. Mast cells\par  are increased in the spicules (round and normally granular).\par \par Bone Marrow Aspirate Differential: (100 Cells).\par Type  % Normal*\par Blast  0% 0-3\par Neutrophil and\par    Precursors   47% 33-63\par Eosinophil  3% 1-5\par Basophil  0% 0-1\par Pronormoblast  5% 0-2\par Normoblast  43% 15-25\par Monocyte  0% 0-2\par Lymphocyte  7% 10-15\par Plasma cell  0% 0-1\par   *Adult Range\par \par Comment\par Iron stain (examined to evaluate for iron stores; see microscopic\par  description) and Giemsa stain (shows appropriate staining pattern) are\par  performed and evaluated on block(s): 1A, 1B\par \par Ancillary Studies:\par Bone marrow aspirate iron stain:   Iron stores are increased; numerous ring\par  sideroblasts are present (greater than 15%).\par \par Flow cytometry:   The myeloid immunophenotypic findings show decreased\par  myeloid granularity, no increase in myeloid immaturity (myeloblasts,\par  0.32% of cells, with normal immunophenotype), normal myeloid antigen\par  maturation pattern, few mast cells, and the presence of hematogones\par  (which are reported to be low in myelodysplasia).\par The lymphocyte immunophenotypic findings show no diagnostic abnormalities.\par \par Immunohistochemical stains   (block 1A: CD34, , myeloperoxidase,\par  CD71, E-cadherin, factor VIII, CD15, CD61, CD25, CD30, p53) show no\par  increase in CD34 positive cells (less than 3%), erythroid predominance\par  (positive with CD71), with clusters of pronormoblasts (positive with\par  E-cadherin); diminished myeloid elements with maturation (positive\par \par  with myeloperoxidase and CD15), and increased megakaryocyte number with\par  dysplastic, small/hypolobulated morphology (positive with factor VIII,\par  CD61). CD30 is negative in mast cells; a few show dim CD25 positivity.\par \par Cytogenetics:  [id number]\par Date Collected:  10/31/2022\par Result:  Abnormal male karyotype\par Karyotype: 46,XY,del(5)(q15q33)[13]/46,XY[7]\par \par Fluorescence in situ Hybridization (FISH):    [id number]\par Result: ABNORMAL FISH MDS PANEL - 5q deletion detected (65%)\par Probe(s) and Location(s):   G0M487/ D5S23 (5p15.2), EGR1 (5q31), D7Z1\par  (7p11.1-q11.1), W4D464 (7q31), CEP-8/D8Z2   ? (8p11.1-q11.1), X54R681\par  (20q12)\par ISCN Nomenclature:  nuc clif(Y6P138/V8G81b7,EGR1x1)[130/200],\par  (D7Z1,V4S398)x2[200], (D8Z2,U31A193)x2[197/200], (TP53x2)[197/200]\par \par Molecular Analyses:\par [facility] Advanced NGS Myeloid Report\par Specimen ID:  [id number]\par Date Collected:  10/31/2022\par Specimen Source:  Bone Marrow\par \par RESULT SUMMARY:  ABNORMAL\par DETECTED GENOMIC ALTERATIONS:\par Tier I: Variants of Strong Clinical Significance\par SF3B1 p.Qlz431Zgj\par Tier II: Variants of Potential Clinical Significance\par DNMT3A p.?\par Tier III: Variants of Unknown Clinical Significance\par KIT p.Ubz344Cfs\par \par PERTINENT NEGATIVE RESULTS:\par The following genes are NEGATIVE for clinically relevant mutations.\par  Mutational hotspots and surrounding exonic regions were interrogated for\par  DNA level point mutations and indels (fusions not assayed).\par ABL1, ANKRD26, ASXL1, ATRX, BCOR, BCORL1, BRAF, CALR, CBL, CCND2, CDKN2A,\par  CEBPA, CSF3R, CUX1, DDX41, ETNK1, ETV6, EZH2, FBXW7, FLT3, GATA2, HRAS,\par  IDH1, IDH2, JAK2, KDM6A, KMT2A, KRAS, MAP2K1, MPL, MYD88, NF1, NPM1,\par  NRAS, PDGFRA, PHF6, PTEN, PTPN11, RUNX1, SETBP1, SRSF2, STAG2, TET2,\par  TP53, U2AF1, WT1, ZRSR2\par TECHNICAL SUMMARY :\par DNMT3A\par p.?\par c.2174-1G>A\par 4% allele frequency\par Exon 19\par [id number]\par \par SF3B1\par p.Myl371Rgp\par c.1998G>C\par 39% allele frequency\par Exon 14\par [id number]\par \par \par KIT\par p.Cej825Ppf\par c.1879C>T\par 50% allele frequency\par Exon 12\par [id number]\par \par Clinical History/Data  :\par History of macrocytic anemia with CKD rule out primary bone marrow\par  disorder\par \par CBC, 10/31/2022\par \par Test Code       Result         Reference\par                  Range\par WBC             5.75 K/uL      3.80 - 10.50\par RBC             2.34 M/uL L    4.20 - 5.80\par HGB             8.3 g/dL L     13.0 - 17.0\par HCT             25.4 % L       39.0 - 50.0\par MCV             108.5 fl H     80.0 - 100.0\par MCH             35.5 pg H      27.0 - 34.0\par MCHC            32.7 g/dL      32.0 - 36.0\par RDW             18.5 % H       10.3 - 14.5\par PLT             236 K/uL       150 - 400\par Auto NRBC       0 /100 WBCs    0 - 0\par NEUT%           63.5 %         43.0 - 77.0\par \par NEUT#           3.65 K/uL      1.80 - 7.40\par LYMPH%          20.5 %         13.0 - 44.0\par LYMPH#          1.18 K/uL      1.00 - 3.30\par MONO%           7.0 %          2.0 - 14.0\par MONO#           0.40 K/uL      0.00 - 0.90\par EOS%            3.3 %          0.0 - 6.0\par EOS#            0.19 K/uL      0.00 - 0.50\par BASO%           1.4 %          0.0 - 2.0\par BASO#           0.08 K/uL      0.00 - 0.20\par BUN             28 mg/dL H     7 - 23\par Creatinine      2.32 mg/dL H   0.50 - 1.30\par \par Verified by: Brooklynn Erazo\par (Electronic Signature)\par Reported on: 11/09/22 16:40 EST, [facility] [address], [address]\par  [facility]. [address], [place] [address]\par Phone: [phone]   Fax: [phone]\par \par 10/27/2022\par (Labs on 10/18/2022): Hgb stable at 9.0, normal WBC, platelets\par Creatinine 2.32\par eGFR 28\par \par \par 5/12/2022\par Available labs reviewed. \par Macrocytic anemia, no etiology could be determined. \par

## 2023-07-07 NOTE — HISTORY OF PRESENT ILLNESS
[de-identified] : CHART REVIEW: 81 year-old Mr. MACARIO is seen in consult for macrocytic  anemia (Hgb 9.5, MCV ~125). Patient has multiple medical conditions including stage-3 CKD. He has no symptoms of anemia and has no complaints.  [de-identified] : 05/12/22:\par 81 year-old Mr. MACARIO is seen in consult for macrocytic  anemia (Hgb 9.5, MCV ~125). Patient has multiple medical conditions including stage-3 CKD. He has no symptoms of anemia and has no complaints. \par \par 10/27/22:\par Patient presenting today for follow up for macrocytic anemia. Overall feels well. Endorses some fatigue and shortness of breath with activity. Denies headache, dizziness, Ambulates with walker. He is being followed by his nephrologist who manages his hypertension. Endorses taking diuretics every other day.  \par \par 12/16/2022\par Patient returns for a follow up. He endorses no change in his health status. 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 Patient has some symptoms of fatigue and tiredness. \par \par 1/26/2023\par Patient returns for a follow up. He started taking Revlimid on 12/30/2022. About 2 weeks later he started developing side effects like-- loss  of appetite, body ache, constipation, pins and needle sensation. He also appears to have developed Jaundice. \par \par 2/24/2023\par Patient seen in follow up, accompanied by Preeti ceja, at bedside. He had a recent hospitalization from 1/27 - 2/7 for new onset afib that was detected in the treatment room prior to his blood transfusion appointment. He was found to be in afib, anemic (Hgb 6.3) requiring 4 PRBCs throughout his stay. His course was c/b acute gout flare, neutropenic fever, transaminitis, and JUAN RAMON on CKD. 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 Had an episode of neutropenic fever started on Cefepime but stopped as per ID, most likely due to gout flare. UA + but asymptomatic, not treated. Bld cx neg x2. Syncopal episode while being on the commode yesterday 2/24. As per Preeti, EKG revealed rate controlled afib. Today, his HR was 127 in office. Denies dizziness, SOB, chest pain, palpitations. As per pt, was on a holter monitor that was submitted yesterday. He is f/u with Dr. Reddy (cards). He also have been having a dry cough for few days. + chills. No fever, body aches, night sweats. No sick contacts. Working with PT. Still feels weak, ambulating via wheelchair. Appetite is okay. Weight is stable. \par \par 3/24/2023\par Patient seen in follow up. Accompanied by Preeti ceja at bedside. He has been responding well to 20k U Procrit weekly injections. Last Hgb 10.2, BP stable 111/58. Since his last visit, has been evaluated by cards for his afib (now rate controlled) currently on Metoprolol 25mg BID. He reports feeling much better since last encounter - he is now able to ambulate longer distances with a walker. Endorses unsteady gait (vertigo). Appetite is good, weight is stable. \par \par 5/22/2023\par Patient seen in follow up. Accompanied by aide and daughter at bedside. Had a recent UTI, treated with Amoxicillin. He'll be following up with his PCP tomorrow. He endorses left flank pain that started after his UTI that is hindering him from ambulating. Describes the pain to be "shocking" and sharp in nature, pain reproduced by movements. His last MRI was in 2021 which revealed lumbar spondylosis with mild to moderate spinal canal narrowing. + peripheral neuropathy of his right lower ext. Currently on Gabapentin 200mg daily. His Hgb has been stable since April 27th. Last Hgb 11.1 last week. Otherwise, no complaints. Appetite is good and weight is stable.\par \par 7/5/2023\par Patient seen in follow up. With aide and daughter at bedside. Interim, he's been feeling well. He completed first month of starting Revlimid without any side effects. His most recent CBC from last week 9.7, he plans on self administering Procrit today after the visit. He had MRI done 6/20 which showed degenerative vs inflammation of his lumbar spine. He continues to experience back pain and some scapular pain from pulled muscle last week. Recommended ortho / pain management for chronic back pain but pt denied at this time. He reports feeling weaker in his lower extremities but does not want to participate in PT at this time. Otherwise, feeling well. Appetite is good, weight is stable.

## 2023-07-10 ENCOUNTER — RX RENEWAL (OUTPATIENT)
Age: 83
End: 2023-07-10

## 2023-07-12 ENCOUNTER — LABORATORY RESULT (OUTPATIENT)
Age: 83
End: 2023-07-12

## 2023-07-18 ENCOUNTER — LABORATORY RESULT (OUTPATIENT)
Age: 83
End: 2023-07-18

## 2023-07-19 ENCOUNTER — LABORATORY RESULT (OUTPATIENT)
Age: 83
End: 2023-07-19

## 2023-07-26 ENCOUNTER — LABORATORY RESULT (OUTPATIENT)
Age: 83
End: 2023-07-26

## 2023-08-02 ENCOUNTER — LABORATORY RESULT (OUTPATIENT)
Age: 83
End: 2023-08-02

## 2023-08-02 ENCOUNTER — RX RENEWAL (OUTPATIENT)
Age: 83
End: 2023-08-02

## 2023-08-03 ENCOUNTER — OUTPATIENT (OUTPATIENT)
Dept: OUTPATIENT SERVICES | Facility: HOSPITAL | Age: 83
LOS: 1 days | Discharge: HOME | End: 2023-08-03

## 2023-08-03 DIAGNOSIS — Z86.79 PERSONAL HISTORY OF OTHER DISEASES OF THE CIRCULATORY SYSTEM: Chronic | ICD-10-CM

## 2023-08-03 DIAGNOSIS — Z90.79 ACQUIRED ABSENCE OF OTHER GENITAL ORGAN(S): Chronic | ICD-10-CM

## 2023-08-03 DIAGNOSIS — Z98.49 CATARACT EXTRACTION STATUS, UNSPECIFIED EYE: Chronic | ICD-10-CM

## 2023-08-03 DIAGNOSIS — D64.9 ANEMIA, UNSPECIFIED: ICD-10-CM

## 2023-08-03 DIAGNOSIS — Z98.890 OTHER SPECIFIED POSTPROCEDURAL STATES: Chronic | ICD-10-CM

## 2023-08-09 ENCOUNTER — LABORATORY RESULT (OUTPATIENT)
Age: 83
End: 2023-08-09

## 2023-08-10 ENCOUNTER — APPOINTMENT (OUTPATIENT)
Dept: HEMATOLOGY ONCOLOGY | Facility: CLINIC | Age: 83
End: 2023-08-10
Payer: MEDICARE

## 2023-08-10 VITALS
RESPIRATION RATE: 16 BRPM | TEMPERATURE: 96.7 F | SYSTOLIC BLOOD PRESSURE: 119 MMHG | HEART RATE: 94 BPM | BODY MASS INDEX: 30.79 KG/M2 | WEIGHT: 185 LBS | OXYGEN SATURATION: 97 % | DIASTOLIC BLOOD PRESSURE: 57 MMHG

## 2023-08-10 PROCEDURE — 99213 OFFICE O/P EST LOW 20 MIN: CPT

## 2023-08-10 NOTE — ASSESSMENT
[FreeTextEntry1] : 81 year-old Mr. MACARIO is seen in follow up for macrocytic anemia (initial evaluation: HGB ~9.5, MCV ~125), WBC and platelet counts are preserved. Patient now has some symptoms of anemia. 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 He was started on Revlimid in December but was unable to tolerate due to multiple side effects. Recent hospitalization for new onset afib + anemia. Course c/b acute gout flare, neutropenic fever, transaminitis, and JUAN RAMON on CKD. Since his discharge from the hospital, he has had two vagal episodes with afib RVR which is now being controlled by 25mg Metoprolol BID. Clinically, he is responding well to the Procrit injections with his Hgb ~10 and appears with more energy and strength. Restarted Revlimid 5mg daily, tolerating well. Weekly CBC and CMP to monitor his blood counts as well as liver/kidney function. Continue Procrit injections weekly to maintain Hgb > 11. 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 along with primary bone marrow disorder  - Bone marrow biopsy: MDS with 5q deletion (65% cells) and MDS-RS with SF3B1 (39% allele frequency)  - Discussed and reviewed bone marrow findings with the patient   - Started on Revlimid 5 mg daily (recommended dose 10 mg) and held in January 2023 due to cytopenias req hospitalization - Started 20,000U Procrit injections since February with great response (Hgb 10+)  - Increase Procrit to 40,000 units QW (8/10/2023); hold for Hgb >/= 11 - Restarted Revlimid 5 mg daily (June 2023), tolerating well - Will hold Revlimid as of today (8/10/2023) for worsening pancytopenia - C/w trending CBC, CMP, LDH weekly home draws (order in) - Transfusion support per protocol  - RTC in 2 months  [ ] Afib, rate controlled - New onset afib - Likely multifactorial etiology of dehydration and anemia  - [id number] score = 5 - On 6.25mg of Carvedilol but held ASA and Plavix due to anemia - Evaluated by cards and started on Metoprolol  - Now afib rate controlled  [ ] Gout - C/w Allopurinol and Colchicine  - Right toe without S&S of infection - Continue Allopurinol - Continue f/u with rheum   [ ] Back pain  - Last MRI in 2021 with lumbar spondylosis, with spinal canal narrowing  - Left flank pain may be r/t the stenosis causing pinched nerve pains - Currently on Gabapentin 200mg daily - Increased to Gabapentin 300mg TID  - Repeat MRI with no contrast (6/20) - chronic lumbar spondylosis - Recommended spine specialist - pt deferred at this time and will f/u w/ rheum  [ ] Hypokalemia - K of 3.3 on 6/28  - Rx sent for 20meq klor x2 to be taken every other day reevaluate next week - Caution with potassium replenishment as pt has CKD

## 2023-08-10 NOTE — HISTORY OF PRESENT ILLNESS
[de-identified] : CHART REVIEW: 81-year-old Mr. MACARIO is seen in consult for macrocytic anemia (Hgb 9.5, MCV ~125). Patient has multiple medical conditions including stage-3 CKD. He has no symptoms of anemia and has no complaints.  [de-identified] : 05/12/22: 81 year-old Mr. MACARIO is seen in consult for macrocytic  anemia (Hgb 9.5, MCV ~125). Patient has multiple medical conditions including stage-3 CKD. He has no symptoms of anemia and has no complaints.   10/27/22: Patient presenting today for follow up for macrocytic anemia. Overall feels well. Endorses some fatigue and shortness of breath with activity. Denies headache, dizziness, Ambulates with walker. He is being followed by his nephrologist who manages his hypertension. Endorses taking diuretics every other day.    12/16/2022 Patient returns for a follow up. He endorses no change in his health status. 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 Patient has some symptoms of fatigue and tiredness.   1/26/2023 Patient returns for a follow up. He started taking Revlimid on 12/30/2022. About 2 weeks later he started developing side effects like-- loss  of appetite, body ache, constipation, pins and needle sensation. He also appears to have developed Jaundice.   2/24/2023 Patient seen in follow up, accompanied by Preeti ceja, at bedside. He had a recent hospitalization from 1/27 - 2/7 for new onset afib that was detected in the treatment room prior to his blood transfusion appointment. He was found to be in afib, anemic (Hgb 6.3) requiring 4 PRBCs throughout his stay. His course was c/b acute gout flare, neutropenic fever, transaminitis, and JUAN RAMON on CKD. 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 Had an episode of neutropenic fever started on Cefepime but stopped as per ID, most likely due to gout flare. UA + but asymptomatic, not treated. Bld cx neg x2. Syncopal episode while being on the commode yesterday 2/24. As per Preeti, EKG revealed rate controlled afib. Today, his HR was 127 in office. Denies dizziness, SOB, chest pain, palpitations. As per pt, was on a holter monitor that was submitted yesterday. He is f/u with Dr. Reddy (cards). He also have been having a dry cough for few days. + chills. No fever, body aches, night sweats. No sick contacts. Working with PT. Still feels weak, ambulating via wheelchair. Appetite is okay. Weight is stable.   3/24/2023 Patient seen in follow up. Accompanied by Preeti ceja at bedside. He has been responding well to 20k U Procrit weekly injections. Last Hgb 10.2, BP stable 111/58. Since his last visit, has been evaluated by cards for his afib (now rate controlled) currently on Metoprolol 25mg BID. He reports feeling much better since last encounter - he is now able to ambulate longer distances with a walker. Endorses unsteady gait (vertigo). Appetite is good, weight is stable.   5/22/2023 Patient seen in follow up. Accompanied by aide and daughter at bedside. Had a recent UTI, treated with Amoxicillin. He'll be following up with his PCP tomorrow. He endorses left flank pain that started after his UTI that is hindering him from ambulating. Describes the pain to be "shocking" and sharp in nature, pain reproduced by movements. His last MRI was in 2021 which revealed lumbar spondylosis with mild to moderate spinal canal narrowing. + peripheral neuropathy of his right lower ext. Currently on Gabapentin 200mg daily. His Hgb has been stable since April 27th. Last Hgb 11.1 last week. Otherwise, no complaints. Appetite is good and weight is stable.  7/5/2023 Patient seen in follow up. With aide and daughter at bedside. Interim, he's been feeling well. He completed first month of starting Revlimid without any side effects. His most recent CBC from last week 9.7, he plans on self administering Procrit today after the visit. He had MRI done 6/20 which showed degenerative vs inflammation of his lumbar spine. He continues to experience back pain and some scapular pain from pulled muscle last week. Recommended ortho / pain management for chronic back pain but pt denied at this time. He reports feeling weaker in his lower extremities but does not want to participate in PT at this time. Otherwise, feeling well. Appetite is good, weight is stable.   8/10/2023 Patient presents for a follow up. He appears pale. He feels weak and sleepy after taking the Lenalidomide pill. He is not doing any PT. He is not able to walk by himself yet. Hs anemia has become worse despite EUGENIA. Lenalidomide may not be helping to improve his disease but worsening the pancytopenia.

## 2023-08-10 NOTE — RESULTS/DATA
[FreeTextEntry1] : 8/9/2023 Most recent blood work from 8/9/2023 WBC 1.14  Hgb 7.3     7/2/2023 Hgb 9.9, .4 WBC platelet normal  Cr 2.3 eGFR 28  Hypokalemic on 6/28 3.3  5/22/2023 WBC and platelet WNL Hgb 11.1  .9 CMP from 5/4-  Cr 1.79, eGFR 38 Pending CMP LDH UA today  3/24/2023 WBC 6.28 Hgb 10.2 .7 Platelet 224 Last Cr (2/24/23) - 2.42 Pending repeat CBC, CMP, LDH, UA  2/24/2023 WBC 2.62 Hgb 8.8 Platelet 128 Pending CMP, LDH, UA  1/26/2023 Last Hgb 9.1, Cr 2.3 (12/16/2022)    12/16/2022 HGB on 10/31/2022; 8.3,  Bone marrow biopsy:  Patient:   MASOOD MACARIO   Accession:                             [id number]  Collected Date/Time:                   10/31/2022 16:49 EDT Received Date/Time:                    10/31/2022 16:50 EDT  Hematopathology Addendum Report - Auth (Verified)  Hematopathology Addendum Additional immunohistochemical stains (block 1A: p53, ) show  increased  positive interstitial mast cells without aggregates. TP53  shows less than 1% bright positive cells.  There is no change in the diagnosis.  Verified by: Brooklynn Erazo (Electronic Signature) Reported on: 11/17/22 09:24 [facility], [facility], [address] [place] [address] Phone: [phone]   Fax: [phone] _________________________________________________________________  Disclaimer Slide(s) with built in immunohistochemical study control(s) and negative  control associated with this case has/have been verified by the sign out  pathologist.  For slide(s) without built in controls positive control slides has/have  been reviewed and approved by immunohistochemistry lab  These immunohistochemical/ in-situ hybridization tests have been developed  and their performance characteristics determined by [facility], Department of Pathology, Division of Immunopathology,  [facility] [place] [address].  It has not been cleared  or approved by the U.S. Food and Drug Administration.  The FDA has  determined that such clearance or approval is not necessary.  This test  is used for clinical purposes.  The laboratory is certified under the  CLIA-88 as qualified to perform high complexity clinical testing. Hematopathology Addendum Report - Auth (Verified)  Hematopathology Addendum       Comprehensive Hematopathology Report  Final Diagnosis : 1, 2. 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 The bone marrow  shows hypercellularity with erythroid hyperplasia and increased ring  sideroblasts and dysplastic megakaryocytosis. Interstitial mast cells  are increased with normal morphology, few CD25 positive, negative CD30.  The findings show myelodysplastic syndrome with multilineage dysplasia  (hypolobulated megakaryocytes) and ring sideroblasts. Correlation with  cytogenetics, FISH, and somatic mutation analysis to evaluate for complex  karyotype, del(5q), -7, del(7q) SF3B1, TP53, other abnormalities is  done. Please note findings of an   abnormal male karyotype, 46,XY,del(5)(q15q33) [13]/46,XY[7],  a positive MDS FISH panel and [facility]  Myeloid Report showing   Tier I: Variants of Strong Clinical Significance:  SF3B1 p.Iqh737Vjd (VAF: 39%),   Tier II: Variants of Potential Clinical   Significance: DNMT3A p.?  (VAF: 4%), Tier III: Variants of Unknown  Clinical Significance:   KIT p.Pdn506Zlh  (VAF: 50%) . Based on the  additional findings, the MDS classification (ICC) is MDS with del(5q) and  with WHO is MDS with low blasts and 5q deletion.  Dr. Mcmahon was notified of the diagnosis on 11/07/2022.  Morphology: Microscopic description: 1. Biopsy:   Sections of bone marrow biopsy and bone marrow fragments in  clot show hypercellularity (50-85% cellularity), erythroid predominance  with maturation and increased pronormoblasts, maturing and mature  myeloid elements, megakaryocytes at least normal in number with abnormal  morphology (increased hypolobulated/small forms), increased interstitial  mast cells without aggregates, and iron stores increased.  2. Aspirate:   Cellular spicules are present, adequate for interpretation.   Maturing and mature myeloid and erythroid elements are present with  erythroid predominance (M:E ratio (1.16:1).  Megakaryocytes appear at  least normal in number with small/hypolobulated morphology. Mast cells  are increased in the spicules (round and normally granular).  Bone Marrow Aspirate Differential: (100 Cells). Type  % Normal* Blast  0% 0-3 Neutrophil and    Precursors   47% 33-63 Eosinophil  3% 1-5 Basophil  0% 0-1 Pronormoblast  5% 0-2 Normoblast  43% 15-25 Monocyte  0% 0-2 Lymphocyte  7% 10-15 Plasma cell  0% 0-1   *Adult Range  Comment Iron stain (examined to evaluate for iron stores; see microscopic  description) and Giemsa stain (shows appropriate staining pattern) are  performed and evaluated on block(s): 1A, 1B  Ancillary Studies: Bone marrow aspirate iron stain:   Iron stores are increased; numerous ring  sideroblasts are present (greater than 15%).  Flow cytometry:   The myeloid immunophenotypic findings show decreased  myeloid granularity, no increase in myeloid immaturity (myeloblasts,  0.32% of cells, with normal immunophenotype), normal myeloid antigen  maturation pattern, few mast cells, and the presence of hematogones  (which are reported to be low in myelodysplasia). The lymphocyte immunophenotypic findings show no diagnostic abnormalities.  Immunohistochemical stains   (block 1A: CD34, , myeloperoxidase,  CD71, E-cadherin, factor VIII, CD15, CD61, CD25, CD30, p53) show no  increase in CD34 positive cells (less than 3%), erythroid predominance  (positive with CD71), with clusters of pronormoblasts (positive with  E-cadherin); diminished myeloid elements with maturation (positive   with myeloperoxidase and CD15), and increased megakaryocyte number with  dysplastic, small/hypolobulated morphology (positive with factor VIII,  CD61). CD30 is negative in mast cells; a few show dim CD25 positivity.  Cytogenetics:  [id number] Date Collected:  10/31/2022 Result:  Abnormal male karyotype Karyotype: 46,XY,del(5)(q15q33)[13]/46,XY[7]  Fluorescence in situ Hybridization (FISH):    [id number] Result: ABNORMAL FISH MDS PANEL - 5q deletion detected (65%) Probe(s) and Location(s):   N1A659/ D5S23 (5p15.2), EGR1 (5q31), D7Z1  (7p11.1-q11.1), U0H597 (7q31), CEP-8/D8Z2   ? (8p11.1-q11.1), P63F869  (20q12) ISCN Nomenclature:  nuc clif(M5V248/V4Y56f2,EGR1x1)[130/200],  (D7Z1,E3K996)x2[200], (D8Z2,Q54E137)x2[197/200], (TP53x2)[197/200]  Molecular Analyses: [employer] Advanced NGS Myeloid Report Specimen ID:  [id number] Date Collected:  10/31/2022 Specimen Source:  Bone Marrow  RESULT SUMMARY:  ABNORMAL DETECTED GENOMIC ALTERATIONS: Tier I: Variants of Strong Clinical Significance SF3B1 p.Qgz389Rwc Tier II: Variants of Potential Clinical Significance DNMT3A p.? Tier III: Variants of Unknown Clinical Significance KIT p.Zxk206Uzk  PERTINENT NEGATIVE RESULTS: The following genes are NEGATIVE for clinically relevant mutations.  Mutational hotspots and surrounding exonic regions were interrogated for  DNA level point mutations and indels (fusions not assayed). ABL1, ANKRD26, ASXL1, ATRX, BCOR, BCORL1, BRAF, CALR, CBL, CCND2, CDKN2A,  CEBPA, CSF3R, CUX1, DDX41, ETNK1, ETV6, EZH2, FBXW7, FLT3, GATA2, HRAS,  IDH1, IDH2, JAK2, KDM6A, KMT2A, KRAS, MAP2K1, MPL, MYD88, NF1, NPM1,  NRAS, PDGFRA, PHF6, PTEN, PTPN11, RUNX1, SETBP1, SRSF2, STAG2, TET2,  TP53, U2AF1, WT1, ZRSR2 TECHNICAL SUMMARY : DNMT3A p.? c.2174-1G>A 4% allele frequency Exon 19 [id number]  SF3B1 p.Msl223Ggs c.1998G>C 39% allele frequency Exon 14 [id number]   KIT p.Vig774Aqp c.1879C>T 50% allele frequency Exon 12 [id number]  Clinical History/Data  : History of macrocytic anemia with CKD rule out primary bone marrow  disorder  CBC, 10/31/2022  Test Code       Result         Reference                  Range WBC             5.75 K/uL      3.80 - 10.50 RBC             2.34 M/uL L    4.20 - 5.80 HGB             8.3 g/dL L     13.0 - 17.0 HCT             25.4 % L       39.0 - 50.0 MCV             108.5 fl H     80.0 - 100.0 MCH             35.5 pg H      27.0 - 34.0 MCHC            32.7 g/dL      32.0 - 36.0 RDW             18.5 % H       10.3 - 14.5 PLT             236 K/uL       150 - 400 Auto NRBC       0 /100 WBCs    0 - 0 NEUT%           63.5 %         43.0 - 77.0  NEUT#           3.65 K/uL      1.80 - 7.40 LYMPH%          20.5 %         13.0 - 44.0 LYMPH#          1.18 K/uL      1.00 - 3.30 MONO%           7.0 %          2.0 - 14.0 MONO#           0.40 K/uL      0.00 - 0.90 EOS%            3.3 %          0.0 - 6.0 EOS#            0.19 K/uL      0.00 - 0.50 BASO%           1.4 %          0.0 - 2.0 BASO#           0.08 K/uL      0.00 - 0.20 BUN             28 mg/dL H     7 - 23 Creatinine      2.32 mg/dL H   0.50 - 1.30  Verified by: Brooklynn Erazo (Electronic Signature) Reported on: 11/09/22 16:40 [facility], [facility], [address] [place] [address] Phone: [phone]   Fax: [phone]  10/27/2022 (Labs on 10/18/2022): Hgb stable at 9.0, normal WBC, platelets Creatinine 2.32 eGFR 28   5/12/2022 Available labs reviewed.  Macrocytic anemia, no etiology could be determined.

## 2023-08-10 NOTE — PHYSICAL EXAM
[Ambulatory and capable of all self care but unable to carry out any work activities] : Status 2- Ambulatory and capable of all self care but unable to carry out any work activities. Up and about more than 50% of waking hours [Normal] : affect appropriate [de-identified] : seen in wheelchair [de-identified] : afib rate controlled  [de-identified] : unsteady gait, 2 ppl assist

## 2023-08-16 ENCOUNTER — LABORATORY RESULT (OUTPATIENT)
Age: 83
End: 2023-08-16

## 2023-08-18 ENCOUNTER — RESULT REVIEW (OUTPATIENT)
Age: 83
End: 2023-08-18

## 2023-08-18 ENCOUNTER — OUTPATIENT (OUTPATIENT)
Dept: OUTPATIENT SERVICES | Facility: HOSPITAL | Age: 83
LOS: 1 days | End: 2023-08-18
Payer: MEDICARE

## 2023-08-18 ENCOUNTER — APPOINTMENT (OUTPATIENT)
Dept: HEMATOLOGY ONCOLOGY | Facility: CLINIC | Age: 83
End: 2023-08-18

## 2023-08-18 ENCOUNTER — NON-APPOINTMENT (OUTPATIENT)
Age: 83
End: 2023-08-18

## 2023-08-18 DIAGNOSIS — Z86.79 PERSONAL HISTORY OF OTHER DISEASES OF THE CIRCULATORY SYSTEM: Chronic | ICD-10-CM

## 2023-08-18 DIAGNOSIS — D64.9 ANEMIA, UNSPECIFIED: ICD-10-CM

## 2023-08-18 DIAGNOSIS — Z98.890 OTHER SPECIFIED POSTPROCEDURAL STATES: Chronic | ICD-10-CM

## 2023-08-18 DIAGNOSIS — Z90.79 ACQUIRED ABSENCE OF OTHER GENITAL ORGAN(S): Chronic | ICD-10-CM

## 2023-08-18 DIAGNOSIS — Z98.49 CATARACT EXTRACTION STATUS, UNSPECIFIED EYE: Chronic | ICD-10-CM

## 2023-08-18 LAB
ANISOCYTOSIS BLD QL: SLIGHT — SIGNIFICANT CHANGE UP
BASOPHILS # BLD AUTO: 0.03 K/UL — SIGNIFICANT CHANGE UP (ref 0–0.2)
BASOPHILS NFR BLD AUTO: 1 % — SIGNIFICANT CHANGE UP (ref 0–2)
DACRYOCYTES BLD QL SMEAR: SLIGHT — SIGNIFICANT CHANGE UP
ELLIPTOCYTES BLD QL SMEAR: SLIGHT — SIGNIFICANT CHANGE UP
EOSINOPHIL # BLD AUTO: 0 K/UL — SIGNIFICANT CHANGE UP (ref 0–0.5)
EOSINOPHIL NFR BLD AUTO: 0 % — SIGNIFICANT CHANGE UP (ref 0–6)
HCT VFR BLD CALC: 20.8 % — CRITICAL LOW (ref 39–50)
HGB BLD-MCNC: 7.1 G/DL — LOW (ref 13–17)
HYPOCHROMIA BLD QL: SLIGHT — SIGNIFICANT CHANGE UP
LYMPHOCYTES # BLD AUTO: 1.15 K/UL — SIGNIFICANT CHANGE UP (ref 1–3.3)
LYMPHOCYTES # BLD AUTO: 44 % — SIGNIFICANT CHANGE UP (ref 13–44)
MACROCYTES BLD QL: SLIGHT — SIGNIFICANT CHANGE UP
MCHC RBC-ENTMCNC: 34.1 G/DL — SIGNIFICANT CHANGE UP (ref 32–36)
MCHC RBC-ENTMCNC: 38.2 PG — HIGH (ref 27–34)
MCV RBC AUTO: 111.8 FL — HIGH (ref 80–100)
MONOCYTES # BLD AUTO: 0.26 K/UL — SIGNIFICANT CHANGE UP (ref 0–0.9)
MONOCYTES NFR BLD AUTO: 10 % — SIGNIFICANT CHANGE UP (ref 2–14)
NEUTROPHILS # BLD AUTO: 1.17 K/UL — LOW (ref 1.8–7.4)
NEUTROPHILS NFR BLD AUTO: 45 % — SIGNIFICANT CHANGE UP (ref 43–77)
NRBC # BLD: 1 /100 — HIGH (ref 0–0)
NRBC # BLD: SIGNIFICANT CHANGE UP /100 WBCS (ref 0–0)
PLAT MORPH BLD: NORMAL — SIGNIFICANT CHANGE UP
PLATELET # BLD AUTO: 161 K/UL — SIGNIFICANT CHANGE UP (ref 150–400)
POIKILOCYTOSIS BLD QL AUTO: SLIGHT — SIGNIFICANT CHANGE UP
RBC # BLD: 1.86 M/UL — LOW (ref 4.2–5.8)
RBC # FLD: 21.1 % — HIGH (ref 10.3–14.5)
RBC BLD AUTO: ABNORMAL
WBC # BLD: 2.61 K/UL — LOW (ref 3.8–10.5)
WBC # FLD AUTO: 2.61 K/UL — LOW (ref 3.8–10.5)

## 2023-08-19 ENCOUNTER — APPOINTMENT (OUTPATIENT)
Dept: INFUSION THERAPY | Facility: HOSPITAL | Age: 83
End: 2023-08-19

## 2023-08-21 DIAGNOSIS — Z51.89 ENCOUNTER FOR OTHER SPECIFIED AFTERCARE: ICD-10-CM

## 2023-08-21 DIAGNOSIS — D46.9 MYELODYSPLASTIC SYNDROME, UNSPECIFIED: ICD-10-CM

## 2023-08-24 ENCOUNTER — LABORATORY RESULT (OUTPATIENT)
Age: 83
End: 2023-08-24

## 2023-08-29 ENCOUNTER — TRANSCRIPTION ENCOUNTER (OUTPATIENT)
Age: 83
End: 2023-08-29

## 2023-08-30 ENCOUNTER — LABORATORY RESULT (OUTPATIENT)
Age: 83
End: 2023-08-30

## 2023-08-30 DIAGNOSIS — R39.89 OTHER SYMPTOMS AND SIGNS INVOLVING THE GENITOURINARY SYSTEM: ICD-10-CM

## 2023-08-31 ENCOUNTER — TRANSCRIPTION ENCOUNTER (OUTPATIENT)
Age: 83
End: 2023-08-31

## 2023-08-31 NOTE — H&P ADULT - PROBLEM SELECTOR PROBLEM 2
EDMD at bedside - mother/guardian of Pt requesting to leave. Guardian aware that Pt advised to stay in ED until his gait returns to baseline. Guardian accepts all risks and is willing to sign AMA paperwork. Wishes to leave now   Neutropenic fever

## 2023-09-01 ENCOUNTER — LABORATORY RESULT (OUTPATIENT)
Age: 83
End: 2023-09-01

## 2023-09-05 ENCOUNTER — LABORATORY RESULT (OUTPATIENT)
Age: 83
End: 2023-09-05

## 2023-09-06 ENCOUNTER — LABORATORY RESULT (OUTPATIENT)
Age: 83
End: 2023-09-06

## 2023-09-07 ENCOUNTER — APPOINTMENT (OUTPATIENT)
Dept: INTERNAL MEDICINE | Facility: CLINIC | Age: 83
End: 2023-09-07
Payer: MEDICARE

## 2023-09-07 ENCOUNTER — RESULT REVIEW (OUTPATIENT)
Age: 83
End: 2023-09-07

## 2023-09-07 ENCOUNTER — APPOINTMENT (OUTPATIENT)
Dept: HEMATOLOGY ONCOLOGY | Facility: CLINIC | Age: 83
End: 2023-09-07

## 2023-09-07 VITALS — SYSTOLIC BLOOD PRESSURE: 130 MMHG | DIASTOLIC BLOOD PRESSURE: 70 MMHG

## 2023-09-07 VITALS
HEART RATE: 91 BPM | DIASTOLIC BLOOD PRESSURE: 77 MMHG | TEMPERATURE: 98.1 F | SYSTOLIC BLOOD PRESSURE: 144 MMHG | OXYGEN SATURATION: 97 %

## 2023-09-07 DIAGNOSIS — F41.9 ANXIETY DISORDER, UNSPECIFIED: ICD-10-CM

## 2023-09-07 LAB
BASOPHILS # BLD AUTO: 0.04 K/UL — SIGNIFICANT CHANGE UP (ref 0–0.2)
BASOPHILS NFR BLD AUTO: 0.8 % — SIGNIFICANT CHANGE UP (ref 0–2)
EOSINOPHIL # BLD AUTO: 0.12 K/UL — SIGNIFICANT CHANGE UP (ref 0–0.5)
EOSINOPHIL NFR BLD AUTO: 2.3 % — SIGNIFICANT CHANGE UP (ref 0–6)
HCT VFR BLD CALC: 28.4 % — LOW (ref 39–50)
HGB BLD-MCNC: 9.3 G/DL — LOW (ref 13–17)
IMM GRANULOCYTES NFR BLD AUTO: 1.8 % — HIGH (ref 0–0.9)
LYMPHOCYTES # BLD AUTO: 1.23 K/UL — SIGNIFICANT CHANGE UP (ref 1–3.3)
LYMPHOCYTES # BLD AUTO: 24 % — SIGNIFICANT CHANGE UP (ref 13–44)
MCHC RBC-ENTMCNC: 32.7 G/DL — SIGNIFICANT CHANGE UP (ref 32–36)
MCHC RBC-ENTMCNC: 38.8 PG — HIGH (ref 27–34)
MCV RBC AUTO: 118.3 FL — HIGH (ref 80–100)
MONOCYTES # BLD AUTO: 0.32 K/UL — SIGNIFICANT CHANGE UP (ref 0–0.9)
MONOCYTES NFR BLD AUTO: 6.2 % — SIGNIFICANT CHANGE UP (ref 2–14)
NEUTROPHILS # BLD AUTO: 3.33 K/UL — SIGNIFICANT CHANGE UP (ref 1.8–7.4)
NEUTROPHILS NFR BLD AUTO: 64.9 % — SIGNIFICANT CHANGE UP (ref 43–77)
NRBC # BLD: 0 /100 WBCS — SIGNIFICANT CHANGE UP (ref 0–0)
PLATELET # BLD AUTO: 180 K/UL — SIGNIFICANT CHANGE UP (ref 150–400)
RBC # BLD: 2.4 M/UL — LOW (ref 4.2–5.8)
RBC # FLD: 22.8 % — HIGH (ref 10.3–14.5)
WBC # BLD: 5.13 K/UL — SIGNIFICANT CHANGE UP (ref 3.8–10.5)
WBC # FLD AUTO: 5.13 K/UL — SIGNIFICANT CHANGE UP (ref 3.8–10.5)

## 2023-09-07 PROCEDURE — 86901 BLOOD TYPING SEROLOGIC RH(D): CPT

## 2023-09-07 PROCEDURE — 86900 BLOOD TYPING SEROLOGIC ABO: CPT

## 2023-09-07 PROCEDURE — 86923 COMPATIBILITY TEST ELECTRIC: CPT

## 2023-09-07 PROCEDURE — 99214 OFFICE O/P EST MOD 30 MIN: CPT

## 2023-09-07 PROCEDURE — 86850 RBC ANTIBODY SCREEN: CPT

## 2023-09-07 NOTE — REVIEW OF SYSTEMS
[Dysuria] : dysuria [Incontinence] : no incontinence [Hesitancy] : hesitancy [Nocturia] : nocturia [Hematuria] : no hematuria [Frequency] : frequency [Impotence] : no impotency [Poor Libido] : libido not poor [Joint Pain] : no joint pain [Joint Stiffness] : no joint stiffness [Muscle Pain] : no muscle pain [Muscle Weakness] : muscle weakness [Back Pain] : no back pain [Joint Swelling] : no joint swelling [Negative] : Neurological

## 2023-09-07 NOTE — PHYSICAL EXAM
[Normal Sclera/Conjunctiva] : normal sclera/conjunctiva [Normal Outer Ear/Nose] : the outer ears and nose were normal in appearance [No Carotid Bruits] : no carotid bruits [No Abdominal Bruit] : a ~M bruit was not heard ~T in the abdomen [No Palpable Aorta] : no palpable aorta [Normal] : affect was normal and insight and judgment were intact [de-identified] : 2/6 systolic murmur regular with frequent irregularity [de-identified] : ,Bilateral 1+ edema

## 2023-09-07 NOTE — ASSESSMENT
[FreeTextEntry1] : Patient is a 83-year-old male with history of obesity, atrial fibrillation chronic not a candidate for anticoagulation, hypertension, hyperlipidemia coronary artery disease aortic dilatation, gout, mild dysplastic syndrome, chronic kidney disease 3B, GERD, who presents for complaint of anxiety and dysuria  1 urinary tract infection rule out prostatitis Await culture large leukocytes clinically symptomatic  2.  Anxiety We will start Zoloft 25 mg once a day follow-up in 1 month  3.  Hypertension Adequate controlled on amlodipine 5 mg May contribute to peripheral edema  4 hyperlipidemia Continue Lipitor 80 mg next visit check labs  5.  Peripheral vascular disease Getting gabapentin May use nightly  6.  Coronary artery disease Follow-up with cardiology continue aspirin and Plavix  7 gout Continue allopurinol 100 mg once a day  8 myelodysplastic  syndrome/anemia Negative transfusion last hemoglobin 8 over weekend  9.  Atrial fibrillation Not a candidate for anticoagulation  #10 chronic kidney disease Creatinine stable at 2.4

## 2023-09-07 NOTE — HISTORY OF PRESENT ILLNESS
[FreeTextEntry8] :  CC: Anxiety and follow-up for urinary frequency HPI the patient is a 83-year-old male with history of mild dysplastic syndrome anemia obesity, ascending aortic dilatation, hypertension hyperlipidemia chronic atrial fibrillation not a candidate for anticoagulation, coronary artery disease, chronic kidney disease 3B, fatty liver, peripheral vascular disease, lumbar stenosis radiculopathy bilateral leg edema stroke syndrome stenosis right carotid who presents for complaint of urinary frequency denies fever, chills flank pain and anxiety interfering with sleep worries about his health especially with diagnosis of MDS [Formal Caregiver] : formal caregiver

## 2023-09-09 ENCOUNTER — APPOINTMENT (OUTPATIENT)
Dept: INFUSION THERAPY | Facility: HOSPITAL | Age: 83
End: 2023-09-09

## 2023-09-12 NOTE — CONSULT NOTE ADULT - ASSESSMENT
82M MDS, chronic diastolic CHF, HTN, Prostate CA s/p Prostectomy, Carotid Art stenosis s/p CEA, PAD, Renal Art Stenosis s/p stent, CKD, p/w Acute on chronic anemia from MDS c/b New Afib, acute Gout flare, and JUAN RAMON on CKD. Rheumatology consult for gout management      Tophaceous erosive gout   - Risk factors for gout flare: CKD, high red meat consumption  -s/p drain of R. foot 1st PIPJ - 3cc of tophaceous fluid, culture pending    - will f/u Cx    - already received prednisone 30mg x 4 days (1/28-1/31), received colchicine 0.6mg x 3 days  - recommend pt to continue Colchicine 0.6mg daily (renally dose, if CrCl< 30mg, should be giving colchicine every other day)    - Chronic management:  pt is on uric acid lowering agents, continue home Allopurinol 100mg qD, goal uric acid should be < 5     Case discussed with attending     Christo Hercules, PGY-5  Rheumatology Fellow   Pager: [phone], also available on TEAMS   please enter a full 10 digit number for call back   During weekends, please page on call fellow     82M MDS, chronic diastolic CHF, HTN, Prostate CA s/p Prostectomy, Carotid Art stenosis s/p CEA, PAD, Renal Art Stenosis s/p stent, CKD, p/w Acute on chronic anemia from MDS c/b New Afib, acute Gout flare, and JUAN RAMON on CKD. Rheumatology consult for gout management      Tophaceous erosive gout   - Risk factors for gout flare: CKD, high red meat consumption  -s/p drain of R. foot 1st PIPJ - 3cc of tophaceous fluid, culture pending    - will f/u Cx    - already received prednisone 30mg x 4 days (1/28-1/31), received colchicine 0.6mg x 3 days  - recommend pt to continue Colchicine 0.6mg every other day (renally dose)   - avoid NSAID    - Chronic management:  pt is on uric acid lowering agents, continue home Allopurinol 100mg qD, goal uric acid should be < 5     Case discussed with attending     Christo Hercules, PGY-5  Rheumatology Fellow   Pager: [phone], also available on TEAMS   please enter a full 10 digit number for call back   During weekends, please page on call fellow     Information: Selecting Yes will display possible errors in your note based on the variables you have selected. This validation is only offered as a suggestion for you. PLEASE NOTE THAT THE VALIDATION TEXT WILL BE REMOVED WHEN YOU FINALIZE YOUR NOTE. IF YOU WANT TO FAX A PRELIMINARY NOTE YOU WILL NEED TO TOGGLE THIS TO 'NO' IF YOU DO NOT WANT IT IN YOUR FAXED NOTE.

## 2023-09-13 ENCOUNTER — LABORATORY RESULT (OUTPATIENT)
Age: 83
End: 2023-09-13

## 2023-09-14 ENCOUNTER — NON-APPOINTMENT (OUTPATIENT)
Age: 83
End: 2023-09-14

## 2023-09-18 ENCOUNTER — APPOINTMENT (OUTPATIENT)
Dept: HEMATOLOGY ONCOLOGY | Facility: CLINIC | Age: 83
End: 2023-09-18
Payer: MEDICARE

## 2023-09-18 VITALS
RESPIRATION RATE: 16 BRPM | WEIGHT: 184.99 LBS | SYSTOLIC BLOOD PRESSURE: 131 MMHG | TEMPERATURE: 97.5 F | HEART RATE: 88 BPM | DIASTOLIC BLOOD PRESSURE: 78 MMHG | HEIGHT: 65 IN | OXYGEN SATURATION: 96 % | BODY MASS INDEX: 30.82 KG/M2

## 2023-09-18 PROCEDURE — 99214 OFFICE O/P EST MOD 30 MIN: CPT

## 2023-09-20 ENCOUNTER — LABORATORY RESULT (OUTPATIENT)
Age: 83
End: 2023-09-20

## 2023-09-20 ENCOUNTER — TRANSCRIPTION ENCOUNTER (OUTPATIENT)
Age: 83
End: 2023-09-20

## 2023-09-22 ENCOUNTER — LABORATORY RESULT (OUTPATIENT)
Age: 83
End: 2023-09-22

## 2023-09-22 NOTE — PHYSICAL THERAPY INITIAL EVALUATION ADULT - STRENGTHENING, PT EVAL
GOAL: (to be met in 4 wks) increased BUE/BLE strength to 5/5 to decrease assistance with functional activities Bed/Stretcher in lowest position, wheels locked, appropriate side rails in place/Call bell, personal items and telephone in reach/Instruct patient to call for assistance before getting out of bed/chair/stretcher/Non-slip footwear applied when patient is off stretcher/[place] to call system/Physically safe environment - no spills, clutter or unnecessary equipment/Purposeful proactive rounding/Room/bathroom lighting operational, light cord in reach

## 2023-09-25 ENCOUNTER — TRANSCRIPTION ENCOUNTER (OUTPATIENT)
Age: 83
End: 2023-09-25

## 2023-09-26 ENCOUNTER — TRANSCRIPTION ENCOUNTER (OUTPATIENT)
Age: 83
End: 2023-09-26

## 2023-09-27 ENCOUNTER — LABORATORY RESULT (OUTPATIENT)
Age: 83
End: 2023-09-27

## 2023-10-02 ENCOUNTER — APPOINTMENT (OUTPATIENT)
Dept: INFUSION THERAPY | Facility: HOSPITAL | Age: 83
End: 2023-10-02

## 2023-10-02 ENCOUNTER — RESULT REVIEW (OUTPATIENT)
Age: 83
End: 2023-10-02

## 2023-10-02 ENCOUNTER — NON-APPOINTMENT (OUTPATIENT)
Age: 83
End: 2023-10-02

## 2023-10-02 ENCOUNTER — OUTPATIENT (OUTPATIENT)
Dept: OUTPATIENT SERVICES | Facility: HOSPITAL | Age: 83
LOS: 1 days | Discharge: HOME | End: 2023-10-02

## 2023-10-02 DIAGNOSIS — D64.9 ANEMIA, UNSPECIFIED: ICD-10-CM

## 2023-10-02 DIAGNOSIS — Z90.79 ACQUIRED ABSENCE OF OTHER GENITAL ORGAN(S): Chronic | ICD-10-CM

## 2023-10-02 DIAGNOSIS — Z98.890 OTHER SPECIFIED POSTPROCEDURAL STATES: Chronic | ICD-10-CM

## 2023-10-02 DIAGNOSIS — Z86.79 PERSONAL HISTORY OF OTHER DISEASES OF THE CIRCULATORY SYSTEM: Chronic | ICD-10-CM

## 2023-10-02 DIAGNOSIS — D46.9 MYELODYSPLASTIC SYNDROME, UNSPECIFIED: ICD-10-CM

## 2023-10-02 DIAGNOSIS — Z98.49 CATARACT EXTRACTION STATUS, UNSPECIFIED EYE: Chronic | ICD-10-CM

## 2023-10-02 LAB
APTT BLD: 27.6 SEC — SIGNIFICANT CHANGE UP (ref 24.5–35.6)
BASOPHILS # BLD AUTO: 0.03 K/UL — SIGNIFICANT CHANGE UP (ref 0–0.2)
BASOPHILS NFR BLD AUTO: 0.5 % — SIGNIFICANT CHANGE UP (ref 0–2)
EOSINOPHIL # BLD AUTO: 0.1 K/UL — SIGNIFICANT CHANGE UP (ref 0–0.5)
EOSINOPHIL NFR BLD AUTO: 1.7 % — SIGNIFICANT CHANGE UP (ref 0–6)
HCT VFR BLD CALC: 29.6 % — LOW (ref 39–50)
HGB BLD-MCNC: 10.1 G/DL — LOW (ref 13–17)
IMM GRANULOCYTES NFR BLD AUTO: 1.7 % — HIGH (ref 0–0.9)
INR BLD: 1.02 RATIO — SIGNIFICANT CHANGE UP (ref 0.85–1.18)
LYMPHOCYTES # BLD AUTO: 1.56 K/UL — SIGNIFICANT CHANGE UP (ref 1–3.3)
LYMPHOCYTES # BLD AUTO: 25.8 % — SIGNIFICANT CHANGE UP (ref 13–44)
MCHC RBC-ENTMCNC: 34.1 G/DL — SIGNIFICANT CHANGE UP (ref 32–36)
MCHC RBC-ENTMCNC: 39.3 PG — HIGH (ref 27–34)
MCV RBC AUTO: 115.2 FL — HIGH (ref 80–100)
MONOCYTES # BLD AUTO: 0.46 K/UL — SIGNIFICANT CHANGE UP (ref 0–0.9)
MONOCYTES NFR BLD AUTO: 7.6 % — SIGNIFICANT CHANGE UP (ref 2–14)
NEUTROPHILS # BLD AUTO: 3.79 K/UL — SIGNIFICANT CHANGE UP (ref 1.8–7.4)
NEUTROPHILS NFR BLD AUTO: 62.7 % — SIGNIFICANT CHANGE UP (ref 43–77)
NRBC # BLD: 0 /100 WBCS — SIGNIFICANT CHANGE UP (ref 0–0)
PLATELET # BLD AUTO: 153 K/UL — SIGNIFICANT CHANGE UP (ref 150–400)
PROTHROM AB SERPL-ACNC: 11.5 SEC — SIGNIFICANT CHANGE UP (ref 9.5–13)
RBC # BLD: 2.57 M/UL — LOW (ref 4.2–5.8)
RBC # FLD: 18.4 % — HIGH (ref 10.3–14.5)
WBC # BLD: 6.04 K/UL — SIGNIFICANT CHANGE UP (ref 3.8–10.5)
WBC # FLD AUTO: 6.04 K/UL — SIGNIFICANT CHANGE UP (ref 3.8–10.5)

## 2023-10-03 ENCOUNTER — APPOINTMENT (OUTPATIENT)
Dept: INFUSION THERAPY | Facility: HOSPITAL | Age: 83
End: 2023-10-03

## 2023-10-03 DIAGNOSIS — Z51.11 ENCOUNTER FOR ANTINEOPLASTIC CHEMOTHERAPY: ICD-10-CM

## 2023-10-03 LAB
ALBUMIN SERPL ELPH-MCNC: 4.3 G/DL — SIGNIFICANT CHANGE UP (ref 3.3–5)
ALP SERPL-CCNC: 95 U/L — SIGNIFICANT CHANGE UP (ref 40–120)
ALT FLD-CCNC: 12 U/L — SIGNIFICANT CHANGE UP (ref 10–45)
ANION GAP SERPL CALC-SCNC: 17 MMOL/L — SIGNIFICANT CHANGE UP (ref 5–17)
AST SERPL-CCNC: 20 U/L — SIGNIFICANT CHANGE UP (ref 10–40)
BILIRUB SERPL-MCNC: 0.7 MG/DL — SIGNIFICANT CHANGE UP (ref 0.2–1.2)
BUN SERPL-MCNC: 24 MG/DL — HIGH (ref 7–23)
CALCIUM SERPL-MCNC: 9.1 MG/DL — SIGNIFICANT CHANGE UP (ref 8.4–10.5)
CHLORIDE SERPL-SCNC: 101 MMOL/L — SIGNIFICANT CHANGE UP (ref 96–108)
CO2 SERPL-SCNC: 22 MMOL/L — SIGNIFICANT CHANGE UP (ref 22–31)
CREAT SERPL-MCNC: 1.84 MG/DL — HIGH (ref 0.5–1.3)
EGFR: 36 ML/MIN/1.73M2 — LOW
GLUCOSE SERPL-MCNC: 88 MG/DL — SIGNIFICANT CHANGE UP (ref 70–99)
HBV CORE AB SER-ACNC: SIGNIFICANT CHANGE UP
HBV SURFACE AB SER-ACNC: SIGNIFICANT CHANGE UP
HBV SURFACE AG SER-ACNC: SIGNIFICANT CHANGE UP
HCV AB S/CO SERPL IA: 0.07 S/CO — SIGNIFICANT CHANGE UP (ref 0–0.99)
HCV AB SERPL-IMP: SIGNIFICANT CHANGE UP
LDH SERPL L TO P-CCNC: 279 U/L — HIGH (ref 50–242)
POTASSIUM SERPL-MCNC: 4.1 MMOL/L — SIGNIFICANT CHANGE UP (ref 3.5–5.3)
POTASSIUM SERPL-SCNC: 4.1 MMOL/L — SIGNIFICANT CHANGE UP (ref 3.5–5.3)
PROT SERPL-MCNC: 7.2 G/DL — SIGNIFICANT CHANGE UP (ref 6–8.3)
SODIUM SERPL-SCNC: 140 MMOL/L — SIGNIFICANT CHANGE UP (ref 135–145)
URATE SERPL-MCNC: 6.2 MG/DL — SIGNIFICANT CHANGE UP (ref 3.4–8.8)

## 2023-10-04 ENCOUNTER — LABORATORY RESULT (OUTPATIENT)
Age: 83
End: 2023-10-04

## 2023-10-04 ENCOUNTER — NON-APPOINTMENT (OUTPATIENT)
Age: 83
End: 2023-10-04

## 2023-10-04 ENCOUNTER — APPOINTMENT (OUTPATIENT)
Dept: ELECTROPHYSIOLOGY | Facility: CLINIC | Age: 83
End: 2023-10-04
Payer: MEDICARE

## 2023-10-04 ENCOUNTER — APPOINTMENT (OUTPATIENT)
Dept: CARDIOLOGY | Facility: CLINIC | Age: 83
End: 2023-10-04
Payer: MEDICARE

## 2023-10-04 ENCOUNTER — APPOINTMENT (OUTPATIENT)
Dept: INFUSION THERAPY | Facility: HOSPITAL | Age: 83
End: 2023-10-04

## 2023-10-04 VITALS
DIASTOLIC BLOOD PRESSURE: 82 MMHG | WEIGHT: 184 LBS | HEIGHT: 65 IN | BODY MASS INDEX: 30.66 KG/M2 | HEART RATE: 88 BPM | SYSTOLIC BLOOD PRESSURE: 168 MMHG | OXYGEN SATURATION: 100 %

## 2023-10-04 PROCEDURE — 99214 OFFICE O/P EST MOD 30 MIN: CPT

## 2023-10-05 ENCOUNTER — TRANSCRIPTION ENCOUNTER (OUTPATIENT)
Age: 83
End: 2023-10-05

## 2023-10-05 ENCOUNTER — RESULT REVIEW (OUTPATIENT)
Age: 83
End: 2023-10-05

## 2023-10-05 ENCOUNTER — OUTPATIENT (OUTPATIENT)
Dept: OUTPATIENT SERVICES | Facility: HOSPITAL | Age: 83
LOS: 1 days | End: 2023-10-05
Payer: MEDICARE

## 2023-10-05 ENCOUNTER — APPOINTMENT (OUTPATIENT)
Dept: INFUSION THERAPY | Facility: HOSPITAL | Age: 83
End: 2023-10-05

## 2023-10-05 VITALS
HEIGHT: 66 IN | OXYGEN SATURATION: 100 % | WEIGHT: 179.9 LBS | DIASTOLIC BLOOD PRESSURE: 74 MMHG | SYSTOLIC BLOOD PRESSURE: 124 MMHG | RESPIRATION RATE: 18 BRPM | HEART RATE: 52 BPM | TEMPERATURE: 99 F

## 2023-10-05 DIAGNOSIS — Z98.890 OTHER SPECIFIED POSTPROCEDURAL STATES: Chronic | ICD-10-CM

## 2023-10-05 DIAGNOSIS — Z98.49 CATARACT EXTRACTION STATUS, UNSPECIFIED EYE: Chronic | ICD-10-CM

## 2023-10-05 DIAGNOSIS — Z90.79 ACQUIRED ABSENCE OF OTHER GENITAL ORGAN(S): Chronic | ICD-10-CM

## 2023-10-05 DIAGNOSIS — D46.C MYELODYSPLASTIC SYNDROME WITH ISOLATED DEL(5Q) CHROMOSOMAL ABNORMALITY: ICD-10-CM

## 2023-10-05 DIAGNOSIS — Z86.79 PERSONAL HISTORY OF OTHER DISEASES OF THE CIRCULATORY SYSTEM: Chronic | ICD-10-CM

## 2023-10-05 LAB
BASOPHILS # BLD AUTO: 0.03 K/UL — SIGNIFICANT CHANGE UP (ref 0–0.2)
BASOPHILS NFR BLD AUTO: 0.5 % — SIGNIFICANT CHANGE UP (ref 0–2)
EOSINOPHIL # BLD AUTO: 0.14 K/UL — SIGNIFICANT CHANGE UP (ref 0–0.5)
EOSINOPHIL NFR BLD AUTO: 2.5 % — SIGNIFICANT CHANGE UP (ref 0–6)
HCT VFR BLD CALC: 29.1 % — LOW (ref 39–50)
HGB BLD-MCNC: 10 G/DL — LOW (ref 13–17)
IMM GRANULOCYTES NFR BLD AUTO: 1.1 % — HIGH (ref 0–0.9)
LYMPHOCYTES # BLD AUTO: 1.37 K/UL — SIGNIFICANT CHANGE UP (ref 1–3.3)
LYMPHOCYTES # BLD AUTO: 24.1 % — SIGNIFICANT CHANGE UP (ref 13–44)
MCHC RBC-ENTMCNC: 34.4 G/DL — SIGNIFICANT CHANGE UP (ref 32–36)
MCHC RBC-ENTMCNC: 38.9 PG — HIGH (ref 27–34)
MCV RBC AUTO: 113.2 FL — HIGH (ref 80–100)
MONOCYTES # BLD AUTO: 0.35 K/UL — SIGNIFICANT CHANGE UP (ref 0–0.9)
MONOCYTES NFR BLD AUTO: 6.2 % — SIGNIFICANT CHANGE UP (ref 2–14)
NEUTROPHILS # BLD AUTO: 3.73 K/UL — SIGNIFICANT CHANGE UP (ref 1.8–7.4)
NEUTROPHILS NFR BLD AUTO: 65.6 % — SIGNIFICANT CHANGE UP (ref 43–77)
NRBC # BLD: 0 /100 WBCS — SIGNIFICANT CHANGE UP (ref 0–0)
PLATELET # BLD AUTO: 153 K/UL — SIGNIFICANT CHANGE UP (ref 150–400)
RBC # BLD: 2.57 M/UL — LOW (ref 4.2–5.8)
RBC # FLD: 17.8 % — HIGH (ref 10.3–14.5)
WBC # BLD: 5.68 K/UL — SIGNIFICANT CHANGE UP (ref 3.8–10.5)
WBC # FLD AUTO: 5.68 K/UL — SIGNIFICANT CHANGE UP (ref 3.8–10.5)

## 2023-10-05 PROCEDURE — C1751: CPT

## 2023-10-05 PROCEDURE — 36573 INSJ PICC RS&I 5 YR+: CPT

## 2023-10-05 NOTE — ASU DISCHARGE PLAN (ADULT/PEDIATRIC) - ASU DC SPECIAL INSTRUCTIONSFT
Paracentesis    Discharge Instructions  - You have had a paracentesis.  - You may shower in 24 hours.  - Keep the area covered and dry for the next 24 hours.  - Do not perform any heavy lifting until the site is healed.  - You may resume your normal diet.  - You may resume your normal medications however you should wait 24 hours before restarting aspirin, Plavix, or blood thinners.  - It is normal to experience some pain over the site for the next few days. You may take apply ice to the area (20 minutes on, 20 minutes off) and take Tylenol for that pain. Do not take more frequently than every 6 hours and do not exceed more than 3000mg of Tylenol in a 24 hour period.    Notify your primary physician and/or Interventional Radiology IMMEDIATELY if you experience any of the following       - Fever of 100.4F or 38C       - Chills or Rigors/ Shakes       - Swelling and/or Redness in the area around the biopsy site       - Worsening Pain       - Blood soaked bandages or worsening bleeding       - Lightheadedness and/or dizziness upon standing       - Chest Pain/ Tightness       - Shortness of Breath       - Difficulty walking    If you have a problem that you believe requires IMMEDIATE attention, please go to your NEAREST Emergency Room. If you believe your problem can safely wait until you speak to a physician, please call Interventional Radiology for any concerns.    During Normal Weekday Business Hours- You can contact the Interventional Radiology department during normal business hours via telephone.  During Evenings and Weekends- If you need to contact Interventional Radiology during off hours, do so by calling the hospital and requesting to be connected to the Interventional Radiologist on call. PICC Placement    Discharge Instructions  - You have had a PICC implanted in your arm.   - The PICC is ready for use.  - You may shower as long as the PICC and dressing remains dry.  -  No soaking or swimming until the PICC is removed or when the site is completely healed.  - Keep the area covered and dry for as long as the PICC remains in. It may be removed and changed by a nurse as needed.  - Do not perform any heavy lifting or put tension on the area for the next week or until the site is healed.  - You may resume your normal diet.  - You may resume your normal medications however you should wait 48 hours before restarting aspirin, plavix, or blood thinners.  - It is normal to experience some pain over the site for the next few days. You may take apply ice to the area (20 minutes on, 20 minutes off) and take Tylenol for that pain. Do not take more frequently than every 6 hours and do not exceed more than 3000mg of Tylenol in a 24 hour period.    Notify your primary physician and/or Interventional Radiology IMMEDIATELY if you experience any of the following       - Fever of 100.4F or 38C       - Chills or Rigors/ Shakes       - Swelling and/or Redness in the area around the port       - Worsening Pain       - Blood soaked bandages or worsening bleeding       - Lightheadedness and/or dizziness upon standing       - Chest Pain/ Tightness       - Shortness of Breath       - Difficulty walking    If you have a problem that you believe requires IMMEDIATE attention, please go to your NEAREST Emergency Room. If you believe your problem can safely wait until you speak to a physician, please call Interventional Radiology for any concerns.      Please feel free to contact us at [phone] if any problems arise. After 6PM, Monday through Friday, on weekends and on holidays, please call [phone] and ask for the radiology resident on call to be paged.

## 2023-10-05 NOTE — H&P ADULT - HISTORY OF PRESENT ILLNESS
Interventional Radiology    HPI: 83y Male with multiple comorbidities including HTN, afib, macrocytic anemia, stage-3 CKD, myelodysplastic syndrome in need of  PICC for chemo.  Discussed with NP Oriana Woodward from Dr. Mcmahon's office who confirmed request is for double lumen PICC.  Home aid reports patient is taking aspirin and plavix.    Review of Systems:   Constitutional: No fever  Respiratory: No shortness of breath  Cardiovascular: No chest pain  Gastrointestinal: No abdominal or epigastric pain.     Allergies: No Known Allergies    Medications (Abx/Cardiac/Anticoagulation/Blood Products)      Data:  167.6  81.6  T(C): 37.1  HR: 52  BP: 124/74  RR: 18  SpO2: 100%    -WBC 6.04 / HgB 10.1 / Hct 29.6 / Plt 153  -Na 140 / Cl 101 / BUN 24 / Glucose 88  -K 4.1 / CO2 22 / Cr 1.84  -ALT 12 / Alk Phos 95 / T.Bili 0.7  -INR1.02      Physical Exam  General: No acute distress, nontoxic, A&Ox3  Chest: Non labored breathing  Abdomen: Non-distended      RADIOLOGY & ADDITIONAL TESTS:    H & P Note Reviewed from: __hem/onc 9/18/23_____    Plan: 83y Male presents for PICC line insertion  -Risks/Benefits/alternatives explained with the patient and/or healthcare proxy and witnessed informed consent obtained.

## 2023-10-05 NOTE — ASU PATIENT PROFILE, ADULT - FALL HARM RISK - HARM RISK INTERVENTIONS
Assistance with ambulation/Assistance OOB with selected safe patient handling equipment/Communicate Risk of Fall with Harm to all staff/Discuss with provider need for PT consult/Monitor gait and stability/Provide patient with walking aids - walker, cane, crutches/Reinforce activity limits and safety measures with patient and family/Tailored Fall Risk Interventions/Use of alarms - bed, chair and/or voice tab/Visual Cue: Yellow wristband and red socks/Bed in lowest position, wheels locked, appropriate side rails in place/Call bell, personal items and telephone in reach/Instruct patient to call for assistance before getting out of bed or chair/Non-slip footwear when patient is out of bed/[place] to call system/Physically safe environment - no spills, clutter or unnecessary equipment/Purposeful Proactive Rounding/Room/bathroom lighting operational, light cord in reach

## 2023-10-05 NOTE — ASU PREOP CHECKLIST - ALLERGIES REVIEWED
Impression: Exposure keratoconjunctivitis, bilateral
 -0.8mm PP applied in IA LLL, removed RLL due to excessive tearing Plan: Patient has been noticing irritation with punctal plug OS. Discussed punctal plug in sitting well with no signs of infection, however does appear to be inflamed in area. Offered the option of removing plug, patient declined; as it significant helps KARISHMA. Will start Maxitrol ointment to see if symptoms improve. Recommend the following at home dry eye regimen:
1) Restasis BID OU 
2) PF AT's at least 3-4 times per day or more OU 3) Gel/ ointment in the evenings before going to bed 4) Warm compresses for 8-10 minutes per day if no improvement in symptoms 5) Avoid direct air flow from ceiling fans 6) Sleep with sleep mask or goggles 7) START Maxitrol ointment BID OS over puncta (ERx'd to pharmacy) RTC if no improvement in symptoms. If no improvement in symptoms can discuss removing punctal plug and referring for cauterization with [employer].
done

## 2023-10-05 NOTE — ASU PATIENT PROFILE, ADULT - ABILITY TO HEAR (WITH HEARING AID OR HEARING APPLIANCE IF NORMALLY USED):
[place]/Mildly to Moderately Impaired: difficulty hearing in some environments or speaker may need to increase volume or speak distinctly

## 2023-10-06 ENCOUNTER — APPOINTMENT (OUTPATIENT)
Dept: INFUSION THERAPY | Facility: HOSPITAL | Age: 83
End: 2023-10-06

## 2023-10-06 ENCOUNTER — NON-APPOINTMENT (OUTPATIENT)
Age: 83
End: 2023-10-06

## 2023-10-09 ENCOUNTER — OUTPATIENT (OUTPATIENT)
Dept: OUTPATIENT SERVICES | Facility: HOSPITAL | Age: 83
LOS: 1 days | End: 2023-10-09
Payer: MEDICARE

## 2023-10-09 ENCOUNTER — RESULT REVIEW (OUTPATIENT)
Age: 83
End: 2023-10-09

## 2023-10-09 ENCOUNTER — APPOINTMENT (OUTPATIENT)
Dept: INFUSION THERAPY | Facility: HOSPITAL | Age: 83
End: 2023-10-09

## 2023-10-09 ENCOUNTER — NON-APPOINTMENT (OUTPATIENT)
Age: 83
End: 2023-10-09

## 2023-10-09 DIAGNOSIS — Z98.49 CATARACT EXTRACTION STATUS, UNSPECIFIED EYE: Chronic | ICD-10-CM

## 2023-10-09 DIAGNOSIS — D64.9 ANEMIA, UNSPECIFIED: ICD-10-CM

## 2023-10-09 DIAGNOSIS — Z98.890 OTHER SPECIFIED POSTPROCEDURAL STATES: Chronic | ICD-10-CM

## 2023-10-09 DIAGNOSIS — Z86.79 PERSONAL HISTORY OF OTHER DISEASES OF THE CIRCULATORY SYSTEM: Chronic | ICD-10-CM

## 2023-10-09 DIAGNOSIS — Z90.79 ACQUIRED ABSENCE OF OTHER GENITAL ORGAN(S): Chronic | ICD-10-CM

## 2023-10-09 LAB
ANISOCYTOSIS BLD QL: SLIGHT — SIGNIFICANT CHANGE UP
BASOPHILS # BLD AUTO: 0 K/UL — SIGNIFICANT CHANGE UP (ref 0–0.2)
BASOPHILS NFR BLD AUTO: 0 % — SIGNIFICANT CHANGE UP (ref 0–2)
DACRYOCYTES BLD QL SMEAR: SLIGHT — SIGNIFICANT CHANGE UP
ELLIPTOCYTES BLD QL SMEAR: SLIGHT — SIGNIFICANT CHANGE UP
EOSINOPHIL # BLD AUTO: 0.05 K/UL — SIGNIFICANT CHANGE UP (ref 0–0.5)
EOSINOPHIL NFR BLD AUTO: 1 % — SIGNIFICANT CHANGE UP (ref 0–6)
HCT VFR BLD CALC: 27.7 % — LOW (ref 39–50)
HGB BLD-MCNC: 9.5 G/DL — LOW (ref 13–17)
LYMPHOCYTES # BLD AUTO: 1.1 K/UL — SIGNIFICANT CHANGE UP (ref 1–3.3)
LYMPHOCYTES # BLD AUTO: 23 % — SIGNIFICANT CHANGE UP (ref 13–44)
MACROCYTES BLD QL: SLIGHT — SIGNIFICANT CHANGE UP
MCHC RBC-ENTMCNC: 34.3 G/DL — SIGNIFICANT CHANGE UP (ref 32–36)
MCHC RBC-ENTMCNC: 38 PG — HIGH (ref 27–34)
MCV RBC AUTO: 110.8 FL — HIGH (ref 80–100)
METAMYELOCYTES # FLD: 1 % — HIGH (ref 0–0)
MONOCYTES # BLD AUTO: 0.19 K/UL — SIGNIFICANT CHANGE UP (ref 0–0.9)
MONOCYTES NFR BLD AUTO: 4 % — SIGNIFICANT CHANGE UP (ref 2–14)
NEUTROPHILS # BLD AUTO: 3.39 K/UL — SIGNIFICANT CHANGE UP (ref 1.8–7.4)
NEUTROPHILS NFR BLD AUTO: 71 % — SIGNIFICANT CHANGE UP (ref 43–77)
NRBC # BLD: 0 /100 — SIGNIFICANT CHANGE UP (ref 0–0)
NRBC # BLD: SIGNIFICANT CHANGE UP /100 WBCS (ref 0–0)
PLAT MORPH BLD: NORMAL — SIGNIFICANT CHANGE UP
PLATELET # BLD AUTO: 128 K/UL — LOW (ref 150–400)
POIKILOCYTOSIS BLD QL AUTO: SLIGHT — SIGNIFICANT CHANGE UP
RBC # BLD: 2.5 M/UL — LOW (ref 4.2–5.8)
RBC # FLD: 17.1 % — HIGH (ref 10.3–14.5)
RBC BLD AUTO: ABNORMAL
WBC # BLD: 4.78 K/UL — SIGNIFICANT CHANGE UP (ref 3.8–10.5)
WBC # FLD AUTO: 4.78 K/UL — SIGNIFICANT CHANGE UP (ref 3.8–10.5)

## 2023-10-10 ENCOUNTER — NON-APPOINTMENT (OUTPATIENT)
Age: 83
End: 2023-10-10

## 2023-10-11 ENCOUNTER — APPOINTMENT (OUTPATIENT)
Dept: INFUSION THERAPY | Facility: HOSPITAL | Age: 83
End: 2023-10-11

## 2023-10-11 DIAGNOSIS — Z45.2 ENCOUNTER FOR ADJUSTMENT AND MANAGEMENT OF VASCULAR ACCESS DEVICE: ICD-10-CM

## 2023-10-11 DIAGNOSIS — D46.9 MYELODYSPLASTIC SYNDROME, UNSPECIFIED: ICD-10-CM

## 2023-10-13 ENCOUNTER — RESULT REVIEW (OUTPATIENT)
Age: 83
End: 2023-10-13

## 2023-10-13 ENCOUNTER — APPOINTMENT (OUTPATIENT)
Dept: INFUSION THERAPY | Facility: HOSPITAL | Age: 83
End: 2023-10-13

## 2023-10-13 LAB
BASOPHILS # BLD AUTO: 0.03 K/UL — SIGNIFICANT CHANGE UP (ref 0–0.2)
BASOPHILS NFR BLD AUTO: 0.7 % — SIGNIFICANT CHANGE UP (ref 0–2)
EOSINOPHIL # BLD AUTO: 0.1 K/UL — SIGNIFICANT CHANGE UP (ref 0–0.5)
EOSINOPHIL NFR BLD AUTO: 2.4 % — SIGNIFICANT CHANGE UP (ref 0–6)
HCT VFR BLD CALC: 24.6 % — LOW (ref 39–50)
HGB BLD-MCNC: 8.9 G/DL — LOW (ref 13–17)
IMM GRANULOCYTES NFR BLD AUTO: 1.4 % — HIGH (ref 0–0.9)
LYMPHOCYTES # BLD AUTO: 1.29 K/UL — SIGNIFICANT CHANGE UP (ref 1–3.3)
LYMPHOCYTES # BLD AUTO: 30.7 % — SIGNIFICANT CHANGE UP (ref 13–44)
MCHC RBC-ENTMCNC: 36.2 G/DL — HIGH (ref 32–36)
MCHC RBC-ENTMCNC: 38.9 PG — HIGH (ref 27–34)
MCV RBC AUTO: 107.4 FL — HIGH (ref 80–100)
MONOCYTES # BLD AUTO: 0.17 K/UL — SIGNIFICANT CHANGE UP (ref 0–0.9)
MONOCYTES NFR BLD AUTO: 4 % — SIGNIFICANT CHANGE UP (ref 2–14)
NEUTROPHILS # BLD AUTO: 2.55 K/UL — SIGNIFICANT CHANGE UP (ref 1.8–7.4)
NEUTROPHILS NFR BLD AUTO: 60.8 % — SIGNIFICANT CHANGE UP (ref 43–77)
NRBC # BLD: 0 /100 WBCS — SIGNIFICANT CHANGE UP (ref 0–0)
PLATELET # BLD AUTO: 79 K/UL — LOW (ref 150–400)
RBC # BLD: 2.29 M/UL — LOW (ref 4.2–5.8)
RBC # FLD: 16.8 % — HIGH (ref 10.3–14.5)
WBC # BLD: 4.2 K/UL — SIGNIFICANT CHANGE UP (ref 3.8–10.5)
WBC # FLD AUTO: 4.2 K/UL — SIGNIFICANT CHANGE UP (ref 3.8–10.5)

## 2023-10-16 ENCOUNTER — NON-APPOINTMENT (OUTPATIENT)
Age: 83
End: 2023-10-16

## 2023-10-16 ENCOUNTER — APPOINTMENT (OUTPATIENT)
Dept: INFUSION THERAPY | Facility: HOSPITAL | Age: 83
End: 2023-10-16

## 2023-10-16 ENCOUNTER — RESULT REVIEW (OUTPATIENT)
Age: 83
End: 2023-10-16

## 2023-10-16 ENCOUNTER — LABORATORY RESULT (OUTPATIENT)
Age: 83
End: 2023-10-16

## 2023-10-16 ENCOUNTER — APPOINTMENT (OUTPATIENT)
Dept: INTERNAL MEDICINE | Facility: CLINIC | Age: 83
End: 2023-10-16
Payer: MEDICARE

## 2023-10-16 VITALS
WEIGHT: 185 LBS | DIASTOLIC BLOOD PRESSURE: 68 MMHG | HEART RATE: 80 BPM | HEIGHT: 65 IN | OXYGEN SATURATION: 97 % | BODY MASS INDEX: 30.82 KG/M2 | SYSTOLIC BLOOD PRESSURE: 124 MMHG

## 2023-10-16 DIAGNOSIS — L21.9 SEBORRHEIC DERMATITIS, UNSPECIFIED: ICD-10-CM

## 2023-10-16 DIAGNOSIS — R73.09 OTHER ABNORMAL GLUCOSE: ICD-10-CM

## 2023-10-16 LAB
BASOPHILS # BLD AUTO: 0.03 K/UL — SIGNIFICANT CHANGE UP (ref 0–0.2)
BASOPHILS NFR BLD AUTO: 1.1 % — SIGNIFICANT CHANGE UP (ref 0–2)
EOSINOPHIL # BLD AUTO: 0.05 K/UL — SIGNIFICANT CHANGE UP (ref 0–0.5)
EOSINOPHIL NFR BLD AUTO: 1.8 % — SIGNIFICANT CHANGE UP (ref 0–6)
HCT VFR BLD CALC: 22.8 % — LOW (ref 39–50)
HGB BLD-MCNC: 8 G/DL — LOW (ref 13–17)
IMM GRANULOCYTES NFR BLD AUTO: 1.8 % — HIGH (ref 0–0.9)
LYMPHOCYTES # BLD AUTO: 1.18 K/UL — SIGNIFICANT CHANGE UP (ref 1–3.3)
LYMPHOCYTES # BLD AUTO: 42.8 % — SIGNIFICANT CHANGE UP (ref 13–44)
MCHC RBC-ENTMCNC: 35.1 G/DL — SIGNIFICANT CHANGE UP (ref 32–36)
MCHC RBC-ENTMCNC: 38.5 PG — HIGH (ref 27–34)
MCV RBC AUTO: 109.6 FL — HIGH (ref 80–100)
MONOCYTES # BLD AUTO: 0.14 K/UL — SIGNIFICANT CHANGE UP (ref 0–0.9)
MONOCYTES NFR BLD AUTO: 5.1 % — SIGNIFICANT CHANGE UP (ref 2–14)
NEUTROPHILS # BLD AUTO: 1.31 K/UL — LOW (ref 1.8–7.4)
NEUTROPHILS NFR BLD AUTO: 47.4 % — SIGNIFICANT CHANGE UP (ref 43–77)
NRBC # BLD: 0 /100 WBCS — SIGNIFICANT CHANGE UP (ref 0–0)
PLATELET # BLD AUTO: 41 K/UL — LOW (ref 150–400)
RBC # BLD: 2.08 M/UL — LOW (ref 4.2–5.8)
RBC # FLD: 17.2 % — HIGH (ref 10.3–14.5)
WBC # BLD: 2.76 K/UL — LOW (ref 3.8–10.5)
WBC # FLD AUTO: 2.76 K/UL — LOW (ref 3.8–10.5)

## 2023-10-16 PROCEDURE — 86850 RBC ANTIBODY SCREEN: CPT

## 2023-10-16 PROCEDURE — 86900 BLOOD TYPING SEROLOGIC ABO: CPT

## 2023-10-16 PROCEDURE — 86901 BLOOD TYPING SEROLOGIC RH(D): CPT

## 2023-10-16 PROCEDURE — 99214 OFFICE O/P EST MOD 30 MIN: CPT

## 2023-10-16 PROCEDURE — 86923 COMPATIBILITY TEST ELECTRIC: CPT

## 2023-10-16 RX ORDER — LEVOFLOXACIN 500 MG/1
500 TABLET, FILM COATED ORAL DAILY
Qty: 30 | Refills: 0 | Status: DISCONTINUED | COMMUNITY
Start: 2023-09-29 | End: 2023-10-16

## 2023-10-17 ENCOUNTER — NON-APPOINTMENT (OUTPATIENT)
Age: 83
End: 2023-10-17

## 2023-10-17 RX ORDER — AMLODIPINE BESYLATE 5 MG/1
5 TABLET ORAL
Qty: 90 | Refills: 1 | Status: ACTIVE | COMMUNITY

## 2023-10-17 RX ORDER — LENALIDOMIDE 5 MG/1
5 CAPSULE ORAL
Qty: 30 | Refills: 3 | Status: DISCONTINUED | COMMUNITY
Start: 2022-12-28 | End: 2023-10-17

## 2023-10-17 RX ORDER — LIDOCAINE 5% 700 MG/1
5 PATCH TOPICAL
Qty: 1 | Refills: 1 | Status: DISCONTINUED | COMMUNITY
Start: 2023-05-12 | End: 2023-10-17

## 2023-10-17 RX ORDER — ONDANSETRON 8 MG/1
8 TABLET ORAL
Qty: 20 | Refills: 3 | Status: DISCONTINUED | COMMUNITY
Start: 2023-09-29 | End: 2023-10-17

## 2023-10-17 RX ORDER — FLUCONAZOLE 200 MG/1
200 TABLET ORAL DAILY
Qty: 30 | Refills: 0 | Status: DISCONTINUED | COMMUNITY
Start: 2023-09-29 | End: 2023-10-17

## 2023-10-17 RX ORDER — SULFAMETHOXAZOLE AND TRIMETHOPRIM 800; 160 MG/1; MG/1
800-160 TABLET ORAL TWICE DAILY
Qty: 28 | Refills: 0 | Status: DISCONTINUED | COMMUNITY
Start: 2023-09-11 | End: 2023-10-17

## 2023-10-17 RX ORDER — CIPROFLOXACIN HYDROCHLORIDE 500 MG/1
500 TABLET, FILM COATED ORAL
Qty: 28 | Refills: 0 | Status: DISCONTINUED | COMMUNITY
Start: 2023-05-30 | End: 2023-10-17

## 2023-10-17 RX ORDER — FOLIC ACID 1 MG/1
1 TABLET ORAL DAILY
Refills: 0 | Status: DISCONTINUED | COMMUNITY
Start: 2023-02-24 | End: 2023-10-17

## 2023-10-17 RX ORDER — KETOCONAZOLE 20 MG/G
2 CREAM TOPICAL TWICE DAILY
Qty: 1 | Refills: 1 | Status: DISCONTINUED | COMMUNITY
Start: 2022-05-31 | End: 2023-10-17

## 2023-10-17 RX ORDER — POTASSIUM CHLORIDE 1500 MG/1
20 TABLET, FILM COATED, EXTENDED RELEASE ORAL
Qty: 30 | Refills: 3 | Status: DISCONTINUED | COMMUNITY
Start: 2023-07-05 | End: 2023-10-17

## 2023-10-17 RX ORDER — COLCHICINE 0.6 MG/1
0.6 TABLET ORAL
Qty: 36 | Refills: 0 | Status: DISCONTINUED | COMMUNITY
Start: 2023-02-28 | End: 2023-10-17

## 2023-10-17 RX ORDER — ROSUVASTATIN CALCIUM 10 MG/1
10 TABLET, FILM COATED ORAL
Qty: 90 | Refills: 3 | Status: ACTIVE | COMMUNITY

## 2023-10-17 RX ORDER — SENNOSIDES 8.6 MG/1
CAPSULE, GELATIN COATED ORAL
Refills: 0 | Status: ACTIVE | COMMUNITY

## 2023-10-17 RX ORDER — MULTIVIT WITH MIN/MFOLATE/K2 340-15/3 G
50 MCG POWDER (GRAM) ORAL
Qty: 30 | Refills: 0 | Status: DISCONTINUED | COMMUNITY
Start: 2023-02-24 | End: 2023-10-17

## 2023-10-17 RX ORDER — PANTOPRAZOLE SODIUM 40 MG/1
40 TABLET, DELAYED RELEASE ORAL DAILY
Refills: 0 | Status: DISCONTINUED | COMMUNITY
Start: 2023-02-24 | End: 2023-10-17

## 2023-10-17 RX ORDER — CILOSTAZOL 100 MG/1
100 TABLET ORAL
Qty: 180 | Refills: 2 | Status: DISCONTINUED | COMMUNITY
Start: 2021-05-03 | End: 2023-10-17

## 2023-10-17 RX ORDER — METOPROLOL TARTRATE 25 MG/1
25 TABLET, FILM COATED ORAL
Qty: 60 | Refills: 2 | Status: DISCONTINUED | COMMUNITY
Start: 2023-02-24 | End: 2023-10-17

## 2023-10-17 RX ORDER — AMOXICILLIN AND CLAVULANATE POTASSIUM 875; 125 MG/1; MG/1
875-125 TABLET, COATED ORAL TWICE DAILY
Qty: 14 | Refills: 0 | Status: DISCONTINUED | COMMUNITY
Start: 2023-05-09 | End: 2023-10-17

## 2023-10-17 RX ORDER — MOMETASONE FUROATE 1 MG/G
0.1 CREAM TOPICAL
Qty: 15 | Refills: 0 | Status: DISCONTINUED | COMMUNITY
Start: 2022-05-31 | End: 2023-10-17

## 2023-10-18 ENCOUNTER — RESULT REVIEW (OUTPATIENT)
Age: 83
End: 2023-10-18

## 2023-10-18 ENCOUNTER — APPOINTMENT (OUTPATIENT)
Dept: INFUSION THERAPY | Facility: HOSPITAL | Age: 83
End: 2023-10-18

## 2023-10-18 LAB
ANISOCYTOSIS BLD QL: SLIGHT — SIGNIFICANT CHANGE UP
ANISOCYTOSIS BLD QL: SLIGHT — SIGNIFICANT CHANGE UP
BASO STIPL BLD QL SMEAR: PRESENT — SIGNIFICANT CHANGE UP
BASO STIPL BLD QL SMEAR: PRESENT — SIGNIFICANT CHANGE UP
BASOPHILS # BLD AUTO: 0.01 K/UL — SIGNIFICANT CHANGE UP (ref 0–0.2)
BASOPHILS # BLD AUTO: 0.01 K/UL — SIGNIFICANT CHANGE UP (ref 0–0.2)
BASOPHILS NFR BLD AUTO: 0.5 % — SIGNIFICANT CHANGE UP (ref 0–2)
BASOPHILS NFR BLD AUTO: 0.5 % — SIGNIFICANT CHANGE UP (ref 0–2)
DACRYOCYTES BLD QL SMEAR: SLIGHT — SIGNIFICANT CHANGE UP
DACRYOCYTES BLD QL SMEAR: SLIGHT — SIGNIFICANT CHANGE UP
ELLIPTOCYTES BLD QL SMEAR: SLIGHT — SIGNIFICANT CHANGE UP
ELLIPTOCYTES BLD QL SMEAR: SLIGHT — SIGNIFICANT CHANGE UP
EOSINOPHIL # BLD AUTO: 0.03 K/UL — SIGNIFICANT CHANGE UP (ref 0–0.5)
EOSINOPHIL # BLD AUTO: 0.03 K/UL — SIGNIFICANT CHANGE UP (ref 0–0.5)
EOSINOPHIL NFR BLD AUTO: 1.5 % — SIGNIFICANT CHANGE UP (ref 0–6)
EOSINOPHIL NFR BLD AUTO: 1.5 % — SIGNIFICANT CHANGE UP (ref 0–6)
HCT VFR BLD CALC: 21.7 % — LOW (ref 39–50)
HCT VFR BLD CALC: 21.7 % — LOW (ref 39–50)
HGB BLD-MCNC: 7.7 G/DL — LOW (ref 13–17)
HGB BLD-MCNC: 7.7 G/DL — LOW (ref 13–17)
IMM GRANULOCYTES NFR BLD AUTO: 1 % — HIGH (ref 0–0.9)
IMM GRANULOCYTES NFR BLD AUTO: 1 % — HIGH (ref 0–0.9)
LYMPHOCYTES # BLD AUTO: 0.87 K/UL — LOW (ref 1–3.3)
LYMPHOCYTES # BLD AUTO: 0.87 K/UL — LOW (ref 1–3.3)
LYMPHOCYTES # BLD AUTO: 43.5 % — SIGNIFICANT CHANGE UP (ref 13–44)
LYMPHOCYTES # BLD AUTO: 43.5 % — SIGNIFICANT CHANGE UP (ref 13–44)
MACROCYTES BLD QL: SLIGHT — SIGNIFICANT CHANGE UP
MACROCYTES BLD QL: SLIGHT — SIGNIFICANT CHANGE UP
MCHC RBC-ENTMCNC: 35.5 G/DL — SIGNIFICANT CHANGE UP (ref 32–36)
MCHC RBC-ENTMCNC: 35.5 G/DL — SIGNIFICANT CHANGE UP (ref 32–36)
MCHC RBC-ENTMCNC: 39.1 PG — HIGH (ref 27–34)
MCHC RBC-ENTMCNC: 39.1 PG — HIGH (ref 27–34)
MCV RBC AUTO: 110.2 FL — HIGH (ref 80–100)
MCV RBC AUTO: 110.2 FL — HIGH (ref 80–100)
MONOCYTES # BLD AUTO: 0.11 K/UL — SIGNIFICANT CHANGE UP (ref 0–0.9)
MONOCYTES # BLD AUTO: 0.11 K/UL — SIGNIFICANT CHANGE UP (ref 0–0.9)
MONOCYTES NFR BLD AUTO: 5.5 % — SIGNIFICANT CHANGE UP (ref 2–14)
MONOCYTES NFR BLD AUTO: 5.5 % — SIGNIFICANT CHANGE UP (ref 2–14)
NEUTROPHILS # BLD AUTO: 0.96 K/UL — LOW (ref 1.8–7.4)
NEUTROPHILS # BLD AUTO: 0.96 K/UL — LOW (ref 1.8–7.4)
NEUTROPHILS NFR BLD AUTO: 48 % — SIGNIFICANT CHANGE UP (ref 43–77)
NEUTROPHILS NFR BLD AUTO: 48 % — SIGNIFICANT CHANGE UP (ref 43–77)
NRBC # BLD: 0 /100 WBCS — SIGNIFICANT CHANGE UP (ref 0–0)
NRBC # BLD: 0 /100 WBCS — SIGNIFICANT CHANGE UP (ref 0–0)
PLAT MORPH BLD: NORMAL — SIGNIFICANT CHANGE UP
PLAT MORPH BLD: NORMAL — SIGNIFICANT CHANGE UP
PLATELET # BLD AUTO: 32 K/UL — LOW (ref 150–400)
PLATELET # BLD AUTO: 32 K/UL — LOW (ref 150–400)
POIKILOCYTOSIS BLD QL AUTO: SLIGHT — SIGNIFICANT CHANGE UP
POIKILOCYTOSIS BLD QL AUTO: SLIGHT — SIGNIFICANT CHANGE UP
RBC # BLD: 1.97 M/UL — LOW (ref 4.2–5.8)
RBC # BLD: 1.97 M/UL — LOW (ref 4.2–5.8)
RBC # FLD: 18 % — HIGH (ref 10.3–14.5)
RBC # FLD: 18 % — HIGH (ref 10.3–14.5)
RBC BLD AUTO: ABNORMAL
RBC BLD AUTO: ABNORMAL
WBC # BLD: 2 K/UL — LOW (ref 3.8–10.5)
WBC # BLD: 2 K/UL — LOW (ref 3.8–10.5)
WBC # FLD AUTO: 2 K/UL — LOW (ref 3.8–10.5)
WBC # FLD AUTO: 2 K/UL — LOW (ref 3.8–10.5)

## 2023-10-18 PROCEDURE — 93248 EXT ECG>7D<15D REV&INTERPJ: CPT

## 2023-10-19 ENCOUNTER — TRANSCRIPTION ENCOUNTER (OUTPATIENT)
Age: 83
End: 2023-10-19

## 2023-10-20 ENCOUNTER — RESULT REVIEW (OUTPATIENT)
Age: 83
End: 2023-10-20

## 2023-10-20 ENCOUNTER — APPOINTMENT (OUTPATIENT)
Dept: INFUSION THERAPY | Facility: HOSPITAL | Age: 83
End: 2023-10-20

## 2023-10-20 ENCOUNTER — TRANSCRIPTION ENCOUNTER (OUTPATIENT)
Age: 83
End: 2023-10-20

## 2023-10-20 LAB
ANISOCYTOSIS BLD QL: SLIGHT — SIGNIFICANT CHANGE UP
ANISOCYTOSIS BLD QL: SLIGHT — SIGNIFICANT CHANGE UP
BASOPHILS # BLD AUTO: 0.02 K/UL — SIGNIFICANT CHANGE UP (ref 0–0.2)
BASOPHILS # BLD AUTO: 0.02 K/UL — SIGNIFICANT CHANGE UP (ref 0–0.2)
BASOPHILS NFR BLD AUTO: 1 % — SIGNIFICANT CHANGE UP (ref 0–2)
BASOPHILS NFR BLD AUTO: 1 % — SIGNIFICANT CHANGE UP (ref 0–2)
DACRYOCYTES BLD QL SMEAR: SLIGHT — SIGNIFICANT CHANGE UP
DACRYOCYTES BLD QL SMEAR: SLIGHT — SIGNIFICANT CHANGE UP
ELLIPTOCYTES BLD QL SMEAR: SLIGHT — SIGNIFICANT CHANGE UP
ELLIPTOCYTES BLD QL SMEAR: SLIGHT — SIGNIFICANT CHANGE UP
EOSINOPHIL # BLD AUTO: 0.04 K/UL — SIGNIFICANT CHANGE UP (ref 0–0.5)
EOSINOPHIL # BLD AUTO: 0.04 K/UL — SIGNIFICANT CHANGE UP (ref 0–0.5)
EOSINOPHIL NFR BLD AUTO: 2 % — SIGNIFICANT CHANGE UP (ref 0–6)
EOSINOPHIL NFR BLD AUTO: 2 % — SIGNIFICANT CHANGE UP (ref 0–6)
HCT VFR BLD CALC: 25.7 % — LOW (ref 39–50)
HCT VFR BLD CALC: 25.7 % — LOW (ref 39–50)
HGB BLD-MCNC: 9.4 G/DL — LOW (ref 13–17)
HGB BLD-MCNC: 9.4 G/DL — LOW (ref 13–17)
LYMPHOCYTES # BLD AUTO: 1.15 K/UL — SIGNIFICANT CHANGE UP (ref 1–3.3)
LYMPHOCYTES # BLD AUTO: 1.15 K/UL — SIGNIFICANT CHANGE UP (ref 1–3.3)
LYMPHOCYTES # BLD AUTO: 62 % — HIGH (ref 13–44)
LYMPHOCYTES # BLD AUTO: 62 % — HIGH (ref 13–44)
MACROCYTES BLD QL: SLIGHT — SIGNIFICANT CHANGE UP
MACROCYTES BLD QL: SLIGHT — SIGNIFICANT CHANGE UP
MCHC RBC-ENTMCNC: 36.6 G/DL — HIGH (ref 32–36)
MCHC RBC-ENTMCNC: 36.6 G/DL — HIGH (ref 32–36)
MCHC RBC-ENTMCNC: 38.4 PG — HIGH (ref 27–34)
MCHC RBC-ENTMCNC: 38.4 PG — HIGH (ref 27–34)
MCV RBC AUTO: 104.9 FL — HIGH (ref 80–100)
MCV RBC AUTO: 104.9 FL — HIGH (ref 80–100)
MONOCYTES # BLD AUTO: 0.07 K/UL — SIGNIFICANT CHANGE UP (ref 0–0.9)
MONOCYTES # BLD AUTO: 0.07 K/UL — SIGNIFICANT CHANGE UP (ref 0–0.9)
MONOCYTES NFR BLD AUTO: 4 % — SIGNIFICANT CHANGE UP (ref 2–14)
MONOCYTES NFR BLD AUTO: 4 % — SIGNIFICANT CHANGE UP (ref 2–14)
NEUTROPHILS # BLD AUTO: 0.58 K/UL — LOW (ref 1.8–7.4)
NEUTROPHILS # BLD AUTO: 0.58 K/UL — LOW (ref 1.8–7.4)
NEUTROPHILS NFR BLD AUTO: 31 % — LOW (ref 43–77)
NEUTROPHILS NFR BLD AUTO: 31 % — LOW (ref 43–77)
NRBC # BLD: 1 /100 — HIGH (ref 0–0)
NRBC # BLD: 1 /100 — HIGH (ref 0–0)
NRBC # BLD: SIGNIFICANT CHANGE UP /100 WBCS (ref 0–0)
NRBC # BLD: SIGNIFICANT CHANGE UP /100 WBCS (ref 0–0)
PLAT MORPH BLD: NORMAL — SIGNIFICANT CHANGE UP
PLAT MORPH BLD: NORMAL — SIGNIFICANT CHANGE UP
PLATELET # BLD AUTO: 43 K/UL — LOW (ref 150–400)
PLATELET # BLD AUTO: 43 K/UL — LOW (ref 150–400)
POIKILOCYTOSIS BLD QL AUTO: SLIGHT — SIGNIFICANT CHANGE UP
POIKILOCYTOSIS BLD QL AUTO: SLIGHT — SIGNIFICANT CHANGE UP
RBC # BLD: 2.45 M/UL — LOW (ref 4.2–5.8)
RBC # BLD: 2.45 M/UL — LOW (ref 4.2–5.8)
RBC # FLD: 22.2 % — HIGH (ref 10.3–14.5)
RBC # FLD: 22.2 % — HIGH (ref 10.3–14.5)
RBC BLD AUTO: ABNORMAL
RBC BLD AUTO: ABNORMAL
WBC # BLD: 1.86 K/UL — LOW (ref 3.8–10.5)
WBC # BLD: 1.86 K/UL — LOW (ref 3.8–10.5)
WBC # FLD AUTO: 1.86 K/UL — LOW (ref 3.8–10.5)
WBC # FLD AUTO: 1.86 K/UL — LOW (ref 3.8–10.5)

## 2023-10-23 ENCOUNTER — RESULT REVIEW (OUTPATIENT)
Age: 83
End: 2023-10-23

## 2023-10-23 ENCOUNTER — APPOINTMENT (OUTPATIENT)
Dept: INFUSION THERAPY | Facility: HOSPITAL | Age: 83
End: 2023-10-23

## 2023-10-23 ENCOUNTER — NON-APPOINTMENT (OUTPATIENT)
Age: 83
End: 2023-10-23

## 2023-10-23 LAB
ANISOCYTOSIS BLD QL: SLIGHT — SIGNIFICANT CHANGE UP
ANISOCYTOSIS BLD QL: SLIGHT — SIGNIFICANT CHANGE UP
BASO STIPL BLD QL SMEAR: PRESENT — SIGNIFICANT CHANGE UP
BASO STIPL BLD QL SMEAR: PRESENT — SIGNIFICANT CHANGE UP
BASOPHILS # BLD AUTO: 0 K/UL — SIGNIFICANT CHANGE UP (ref 0–0.2)
BASOPHILS # BLD AUTO: 0 K/UL — SIGNIFICANT CHANGE UP (ref 0–0.2)
BASOPHILS NFR BLD AUTO: 0 % — SIGNIFICANT CHANGE UP (ref 0–2)
BASOPHILS NFR BLD AUTO: 0 % — SIGNIFICANT CHANGE UP (ref 0–2)
DACRYOCYTES BLD QL SMEAR: SLIGHT — SIGNIFICANT CHANGE UP
DACRYOCYTES BLD QL SMEAR: SLIGHT — SIGNIFICANT CHANGE UP
ELLIPTOCYTES BLD QL SMEAR: SLIGHT — SIGNIFICANT CHANGE UP
ELLIPTOCYTES BLD QL SMEAR: SLIGHT — SIGNIFICANT CHANGE UP
EOSINOPHIL # BLD AUTO: 0.08 K/UL — SIGNIFICANT CHANGE UP (ref 0–0.5)
EOSINOPHIL # BLD AUTO: 0.08 K/UL — SIGNIFICANT CHANGE UP (ref 0–0.5)
EOSINOPHIL NFR BLD AUTO: 6 % — SIGNIFICANT CHANGE UP (ref 0–6)
EOSINOPHIL NFR BLD AUTO: 6 % — SIGNIFICANT CHANGE UP (ref 0–6)
HCT VFR BLD CALC: 25.7 % — LOW (ref 39–50)
HCT VFR BLD CALC: 25.7 % — LOW (ref 39–50)
HGB BLD-MCNC: 9.2 G/DL — LOW (ref 13–17)
HGB BLD-MCNC: 9.2 G/DL — LOW (ref 13–17)
LYMPHOCYTES # BLD AUTO: 0.81 K/UL — LOW (ref 1–3.3)
LYMPHOCYTES # BLD AUTO: 0.81 K/UL — LOW (ref 1–3.3)
LYMPHOCYTES # BLD AUTO: 58 % — HIGH (ref 13–44)
LYMPHOCYTES # BLD AUTO: 58 % — HIGH (ref 13–44)
MACROCYTES BLD QL: SLIGHT — SIGNIFICANT CHANGE UP
MACROCYTES BLD QL: SLIGHT — SIGNIFICANT CHANGE UP
MCHC RBC-ENTMCNC: 35.8 G/DL — SIGNIFICANT CHANGE UP (ref 32–36)
MCHC RBC-ENTMCNC: 35.8 G/DL — SIGNIFICANT CHANGE UP (ref 32–36)
MCHC RBC-ENTMCNC: 37.9 PG — HIGH (ref 27–34)
MCHC RBC-ENTMCNC: 37.9 PG — HIGH (ref 27–34)
MCV RBC AUTO: 105.8 FL — HIGH (ref 80–100)
MCV RBC AUTO: 105.8 FL — HIGH (ref 80–100)
MONOCYTES # BLD AUTO: 0.03 K/UL — SIGNIFICANT CHANGE UP (ref 0–0.9)
MONOCYTES # BLD AUTO: 0.03 K/UL — SIGNIFICANT CHANGE UP (ref 0–0.9)
MONOCYTES NFR BLD AUTO: 2 % — SIGNIFICANT CHANGE UP (ref 2–14)
MONOCYTES NFR BLD AUTO: 2 % — SIGNIFICANT CHANGE UP (ref 2–14)
NEUTROPHILS # BLD AUTO: 0.48 K/UL — LOW (ref 1.8–7.4)
NEUTROPHILS # BLD AUTO: 0.48 K/UL — LOW (ref 1.8–7.4)
NEUTROPHILS NFR BLD AUTO: 34 % — LOW (ref 43–77)
NEUTROPHILS NFR BLD AUTO: 34 % — LOW (ref 43–77)
NRBC # BLD: 0 /100 — SIGNIFICANT CHANGE UP (ref 0–0)
NRBC # BLD: 0 /100 — SIGNIFICANT CHANGE UP (ref 0–0)
NRBC # BLD: SIGNIFICANT CHANGE UP /100 WBCS (ref 0–0)
NRBC # BLD: SIGNIFICANT CHANGE UP /100 WBCS (ref 0–0)
PLAT MORPH BLD: NORMAL — SIGNIFICANT CHANGE UP
PLAT MORPH BLD: NORMAL — SIGNIFICANT CHANGE UP
PLATELET # BLD AUTO: 98 K/UL — LOW (ref 150–400)
PLATELET # BLD AUTO: 98 K/UL — LOW (ref 150–400)
POIKILOCYTOSIS BLD QL AUTO: SLIGHT — SIGNIFICANT CHANGE UP
POIKILOCYTOSIS BLD QL AUTO: SLIGHT — SIGNIFICANT CHANGE UP
POLYCHROMASIA BLD QL SMEAR: SLIGHT — SIGNIFICANT CHANGE UP
POLYCHROMASIA BLD QL SMEAR: SLIGHT — SIGNIFICANT CHANGE UP
RBC # BLD: 2.43 M/UL — LOW (ref 4.2–5.8)
RBC # BLD: 2.43 M/UL — LOW (ref 4.2–5.8)
RBC # FLD: 21.7 % — HIGH (ref 10.3–14.5)
RBC # FLD: 21.7 % — HIGH (ref 10.3–14.5)
RBC BLD AUTO: ABNORMAL
RBC BLD AUTO: ABNORMAL
WBC # BLD: 1.4 K/UL — LOW (ref 3.8–10.5)
WBC # BLD: 1.4 K/UL — LOW (ref 3.8–10.5)
WBC # FLD AUTO: 1.4 K/UL — LOW (ref 3.8–10.5)
WBC # FLD AUTO: 1.4 K/UL — LOW (ref 3.8–10.5)

## 2023-10-25 ENCOUNTER — APPOINTMENT (OUTPATIENT)
Dept: INFUSION THERAPY | Facility: HOSPITAL | Age: 83
End: 2023-10-25

## 2023-10-26 ENCOUNTER — APPOINTMENT (OUTPATIENT)
Dept: ELECTROPHYSIOLOGY | Facility: CLINIC | Age: 83
End: 2023-10-26
Payer: MEDICARE

## 2023-10-26 ENCOUNTER — NON-APPOINTMENT (OUTPATIENT)
Age: 83
End: 2023-10-26

## 2023-10-26 VITALS
HEART RATE: 109 BPM | WEIGHT: 180 LBS | BODY MASS INDEX: 29.95 KG/M2 | SYSTOLIC BLOOD PRESSURE: 110 MMHG | DIASTOLIC BLOOD PRESSURE: 70 MMHG | OXYGEN SATURATION: 98 %

## 2023-10-26 DIAGNOSIS — I47.10 SUPRAVENTRICULAR TACHYCARDIA, UNSPECIFIED: ICD-10-CM

## 2023-10-26 PROCEDURE — 93000 ELECTROCARDIOGRAM COMPLETE: CPT

## 2023-10-26 PROCEDURE — 99214 OFFICE O/P EST MOD 30 MIN: CPT | Mod: 25

## 2023-10-27 ENCOUNTER — RESULT REVIEW (OUTPATIENT)
Age: 83
End: 2023-10-27

## 2023-10-27 ENCOUNTER — APPOINTMENT (OUTPATIENT)
Dept: INFUSION THERAPY | Facility: HOSPITAL | Age: 83
End: 2023-10-27

## 2023-10-27 LAB
ANISOCYTOSIS BLD QL: SLIGHT — SIGNIFICANT CHANGE UP
ANISOCYTOSIS BLD QL: SLIGHT — SIGNIFICANT CHANGE UP
BASO STIPL BLD QL SMEAR: PRESENT — SIGNIFICANT CHANGE UP
BASO STIPL BLD QL SMEAR: PRESENT — SIGNIFICANT CHANGE UP
BASOPHILS # BLD AUTO: 0 K/UL — SIGNIFICANT CHANGE UP (ref 0–0.2)
BASOPHILS # BLD AUTO: 0 K/UL — SIGNIFICANT CHANGE UP (ref 0–0.2)
BASOPHILS NFR BLD AUTO: 0 % — SIGNIFICANT CHANGE UP (ref 0–2)
BASOPHILS NFR BLD AUTO: 0 % — SIGNIFICANT CHANGE UP (ref 0–2)
DACRYOCYTES BLD QL SMEAR: SLIGHT — SIGNIFICANT CHANGE UP
DACRYOCYTES BLD QL SMEAR: SLIGHT — SIGNIFICANT CHANGE UP
ELLIPTOCYTES BLD QL SMEAR: SLIGHT — SIGNIFICANT CHANGE UP
ELLIPTOCYTES BLD QL SMEAR: SLIGHT — SIGNIFICANT CHANGE UP
EOSINOPHIL # BLD AUTO: 0.03 K/UL — SIGNIFICANT CHANGE UP (ref 0–0.5)
EOSINOPHIL # BLD AUTO: 0.03 K/UL — SIGNIFICANT CHANGE UP (ref 0–0.5)
EOSINOPHIL NFR BLD AUTO: 2 % — SIGNIFICANT CHANGE UP (ref 0–6)
EOSINOPHIL NFR BLD AUTO: 2 % — SIGNIFICANT CHANGE UP (ref 0–6)
HCT VFR BLD CALC: 27.9 % — LOW (ref 39–50)
HCT VFR BLD CALC: 27.9 % — LOW (ref 39–50)
HGB BLD-MCNC: 9.9 G/DL — LOW (ref 13–17)
HGB BLD-MCNC: 9.9 G/DL — LOW (ref 13–17)
LYMPHOCYTES # BLD AUTO: 1.07 K/UL — SIGNIFICANT CHANGE UP (ref 1–3.3)
LYMPHOCYTES # BLD AUTO: 1.07 K/UL — SIGNIFICANT CHANGE UP (ref 1–3.3)
LYMPHOCYTES # BLD AUTO: 72 % — HIGH (ref 13–44)
LYMPHOCYTES # BLD AUTO: 72 % — HIGH (ref 13–44)
MACROCYTES BLD QL: SLIGHT — SIGNIFICANT CHANGE UP
MACROCYTES BLD QL: SLIGHT — SIGNIFICANT CHANGE UP
MCHC RBC-ENTMCNC: 35.5 G/DL — SIGNIFICANT CHANGE UP (ref 32–36)
MCHC RBC-ENTMCNC: 35.5 G/DL — SIGNIFICANT CHANGE UP (ref 32–36)
MCHC RBC-ENTMCNC: 37.9 PG — HIGH (ref 27–34)
MCHC RBC-ENTMCNC: 37.9 PG — HIGH (ref 27–34)
MCV RBC AUTO: 106.9 FL — HIGH (ref 80–100)
MCV RBC AUTO: 106.9 FL — HIGH (ref 80–100)
MONOCYTES # BLD AUTO: 0.04 K/UL — SIGNIFICANT CHANGE UP (ref 0–0.9)
MONOCYTES # BLD AUTO: 0.04 K/UL — SIGNIFICANT CHANGE UP (ref 0–0.9)
MONOCYTES NFR BLD AUTO: 3 % — SIGNIFICANT CHANGE UP (ref 2–14)
MONOCYTES NFR BLD AUTO: 3 % — SIGNIFICANT CHANGE UP (ref 2–14)
NEUTROPHILS # BLD AUTO: 0.34 K/UL — LOW (ref 1.8–7.4)
NEUTROPHILS # BLD AUTO: 0.34 K/UL — LOW (ref 1.8–7.4)
NEUTROPHILS NFR BLD AUTO: 23 % — LOW (ref 43–77)
NEUTROPHILS NFR BLD AUTO: 23 % — LOW (ref 43–77)
NRBC # BLD: 0 /100 — SIGNIFICANT CHANGE UP (ref 0–0)
NRBC # BLD: 0 /100 — SIGNIFICANT CHANGE UP (ref 0–0)
NRBC # BLD: SIGNIFICANT CHANGE UP /100 WBCS (ref 0–0)
NRBC # BLD: SIGNIFICANT CHANGE UP /100 WBCS (ref 0–0)
PLAT MORPH BLD: NORMAL — SIGNIFICANT CHANGE UP
PLAT MORPH BLD: NORMAL — SIGNIFICANT CHANGE UP
PLATELET # BLD AUTO: 200 K/UL — SIGNIFICANT CHANGE UP (ref 150–400)
PLATELET # BLD AUTO: 200 K/UL — SIGNIFICANT CHANGE UP (ref 150–400)
POIKILOCYTOSIS BLD QL AUTO: SLIGHT — SIGNIFICANT CHANGE UP
POIKILOCYTOSIS BLD QL AUTO: SLIGHT — SIGNIFICANT CHANGE UP
POLYCHROMASIA BLD QL SMEAR: SLIGHT — SIGNIFICANT CHANGE UP
POLYCHROMASIA BLD QL SMEAR: SLIGHT — SIGNIFICANT CHANGE UP
RBC # BLD: 2.61 M/UL — LOW (ref 4.2–5.8)
RBC # BLD: 2.61 M/UL — LOW (ref 4.2–5.8)
RBC # FLD: 21.3 % — HIGH (ref 10.3–14.5)
RBC # FLD: 21.3 % — HIGH (ref 10.3–14.5)
RBC BLD AUTO: ABNORMAL
RBC BLD AUTO: ABNORMAL
WBC # BLD: 1.48 K/UL — LOW (ref 3.8–10.5)
WBC # BLD: 1.48 K/UL — LOW (ref 3.8–10.5)
WBC # FLD AUTO: 1.48 K/UL — LOW (ref 3.8–10.5)
WBC # FLD AUTO: 1.48 K/UL — LOW (ref 3.8–10.5)

## 2023-10-30 ENCOUNTER — RESULT REVIEW (OUTPATIENT)
Age: 83
End: 2023-10-30

## 2023-10-30 ENCOUNTER — APPOINTMENT (OUTPATIENT)
Dept: INFUSION THERAPY | Facility: HOSPITAL | Age: 83
End: 2023-10-30

## 2023-10-30 ENCOUNTER — APPOINTMENT (OUTPATIENT)
Dept: HEMATOLOGY ONCOLOGY | Facility: CLINIC | Age: 83
End: 2023-10-30

## 2023-10-30 LAB
ANISOCYTOSIS BLD QL: SLIGHT — SIGNIFICANT CHANGE UP
ANISOCYTOSIS BLD QL: SLIGHT — SIGNIFICANT CHANGE UP
BASOPHILS # BLD AUTO: 0 K/UL — SIGNIFICANT CHANGE UP (ref 0–0.2)
BASOPHILS # BLD AUTO: 0 K/UL — SIGNIFICANT CHANGE UP (ref 0–0.2)
BASOPHILS NFR BLD AUTO: 0 % — SIGNIFICANT CHANGE UP (ref 0–2)
BASOPHILS NFR BLD AUTO: 0 % — SIGNIFICANT CHANGE UP (ref 0–2)
DACRYOCYTES BLD QL SMEAR: SLIGHT — SIGNIFICANT CHANGE UP
DACRYOCYTES BLD QL SMEAR: SLIGHT — SIGNIFICANT CHANGE UP
ELLIPTOCYTES BLD QL SMEAR: SLIGHT — SIGNIFICANT CHANGE UP
ELLIPTOCYTES BLD QL SMEAR: SLIGHT — SIGNIFICANT CHANGE UP
EOSINOPHIL # BLD AUTO: 0.01 K/UL — SIGNIFICANT CHANGE UP (ref 0–0.5)
EOSINOPHIL # BLD AUTO: 0.01 K/UL — SIGNIFICANT CHANGE UP (ref 0–0.5)
EOSINOPHIL NFR BLD AUTO: 1 % — SIGNIFICANT CHANGE UP (ref 0–6)
EOSINOPHIL NFR BLD AUTO: 1 % — SIGNIFICANT CHANGE UP (ref 0–6)
HCT VFR BLD CALC: 28.8 % — LOW (ref 39–50)
HCT VFR BLD CALC: 28.8 % — LOW (ref 39–50)
HGB BLD-MCNC: 10.3 G/DL — LOW (ref 13–17)
HGB BLD-MCNC: 10.3 G/DL — LOW (ref 13–17)
LYMPHOCYTES # BLD AUTO: 1.13 K/UL — SIGNIFICANT CHANGE UP (ref 1–3.3)
LYMPHOCYTES # BLD AUTO: 1.13 K/UL — SIGNIFICANT CHANGE UP (ref 1–3.3)
LYMPHOCYTES # BLD AUTO: 81 % — HIGH (ref 13–44)
LYMPHOCYTES # BLD AUTO: 81 % — HIGH (ref 13–44)
MACROCYTES BLD QL: SLIGHT — SIGNIFICANT CHANGE UP
MACROCYTES BLD QL: SLIGHT — SIGNIFICANT CHANGE UP
MCHC RBC-ENTMCNC: 35.8 G/DL — SIGNIFICANT CHANGE UP (ref 32–36)
MCHC RBC-ENTMCNC: 35.8 G/DL — SIGNIFICANT CHANGE UP (ref 32–36)
MCHC RBC-ENTMCNC: 38.1 PG — HIGH (ref 27–34)
MCHC RBC-ENTMCNC: 38.1 PG — HIGH (ref 27–34)
MCV RBC AUTO: 106.7 FL — HIGH (ref 80–100)
MCV RBC AUTO: 106.7 FL — HIGH (ref 80–100)
MONOCYTES # BLD AUTO: 0.08 K/UL — SIGNIFICANT CHANGE UP (ref 0–0.9)
MONOCYTES # BLD AUTO: 0.08 K/UL — SIGNIFICANT CHANGE UP (ref 0–0.9)
MONOCYTES NFR BLD AUTO: 6 % — SIGNIFICANT CHANGE UP (ref 2–14)
MONOCYTES NFR BLD AUTO: 6 % — SIGNIFICANT CHANGE UP (ref 2–14)
NEUTROPHILS # BLD AUTO: 0.17 K/UL — LOW (ref 1.8–7.4)
NEUTROPHILS # BLD AUTO: 0.17 K/UL — LOW (ref 1.8–7.4)
NEUTROPHILS NFR BLD AUTO: 12 % — LOW (ref 43–77)
NEUTROPHILS NFR BLD AUTO: 12 % — LOW (ref 43–77)
NRBC # BLD: 0 /100 — SIGNIFICANT CHANGE UP (ref 0–0)
NRBC # BLD: 0 /100 — SIGNIFICANT CHANGE UP (ref 0–0)
NRBC # BLD: SIGNIFICANT CHANGE UP /100 WBCS (ref 0–0)
NRBC # BLD: SIGNIFICANT CHANGE UP /100 WBCS (ref 0–0)
PLAT MORPH BLD: NORMAL — SIGNIFICANT CHANGE UP
PLAT MORPH BLD: NORMAL — SIGNIFICANT CHANGE UP
PLATELET # BLD AUTO: 218 K/UL — SIGNIFICANT CHANGE UP (ref 150–400)
PLATELET # BLD AUTO: 218 K/UL — SIGNIFICANT CHANGE UP (ref 150–400)
POIKILOCYTOSIS BLD QL AUTO: SLIGHT — SIGNIFICANT CHANGE UP
POIKILOCYTOSIS BLD QL AUTO: SLIGHT — SIGNIFICANT CHANGE UP
POLYCHROMASIA BLD QL SMEAR: SLIGHT — SIGNIFICANT CHANGE UP
POLYCHROMASIA BLD QL SMEAR: SLIGHT — SIGNIFICANT CHANGE UP
RBC # BLD: 2.7 M/UL — LOW (ref 4.2–5.8)
RBC # BLD: 2.7 M/UL — LOW (ref 4.2–5.8)
RBC # FLD: 21.3 % — HIGH (ref 10.3–14.5)
RBC # FLD: 21.3 % — HIGH (ref 10.3–14.5)
RBC BLD AUTO: ABNORMAL
RBC BLD AUTO: ABNORMAL
WBC # BLD: 1.4 K/UL — LOW (ref 3.8–10.5)
WBC # BLD: 1.4 K/UL — LOW (ref 3.8–10.5)
WBC # FLD AUTO: 1.4 K/UL — LOW (ref 3.8–10.5)
WBC # FLD AUTO: 1.4 K/UL — LOW (ref 3.8–10.5)

## 2023-10-31 ENCOUNTER — APPOINTMENT (OUTPATIENT)
Dept: INFUSION THERAPY | Facility: HOSPITAL | Age: 83
End: 2023-10-31

## 2023-11-01 ENCOUNTER — APPOINTMENT (OUTPATIENT)
Dept: INFUSION THERAPY | Facility: HOSPITAL | Age: 83
End: 2023-11-01

## 2023-11-02 ENCOUNTER — RESULT REVIEW (OUTPATIENT)
Age: 83
End: 2023-11-02

## 2023-11-02 ENCOUNTER — APPOINTMENT (OUTPATIENT)
Dept: HEMATOLOGY ONCOLOGY | Facility: CLINIC | Age: 83
End: 2023-11-02

## 2023-11-02 ENCOUNTER — APPOINTMENT (OUTPATIENT)
Dept: INFUSION THERAPY | Facility: HOSPITAL | Age: 83
End: 2023-11-02

## 2023-11-02 LAB
ANISOCYTOSIS BLD QL: SLIGHT — SIGNIFICANT CHANGE UP
ANISOCYTOSIS BLD QL: SLIGHT — SIGNIFICANT CHANGE UP
BASOPHILS # BLD AUTO: 0 K/UL — SIGNIFICANT CHANGE UP (ref 0–0.2)
BASOPHILS # BLD AUTO: 0 K/UL — SIGNIFICANT CHANGE UP (ref 0–0.2)
BASOPHILS NFR BLD AUTO: 0 % — SIGNIFICANT CHANGE UP (ref 0–2)
BASOPHILS NFR BLD AUTO: 0 % — SIGNIFICANT CHANGE UP (ref 0–2)
DACRYOCYTES BLD QL SMEAR: SLIGHT — SIGNIFICANT CHANGE UP
DACRYOCYTES BLD QL SMEAR: SLIGHT — SIGNIFICANT CHANGE UP
ELLIPTOCYTES BLD QL SMEAR: SLIGHT — SIGNIFICANT CHANGE UP
ELLIPTOCYTES BLD QL SMEAR: SLIGHT — SIGNIFICANT CHANGE UP
EOSINOPHIL # BLD AUTO: 0.05 K/UL — SIGNIFICANT CHANGE UP (ref 0–0.5)
EOSINOPHIL # BLD AUTO: 0.05 K/UL — SIGNIFICANT CHANGE UP (ref 0–0.5)
EOSINOPHIL NFR BLD AUTO: 3 % — SIGNIFICANT CHANGE UP (ref 0–6)
EOSINOPHIL NFR BLD AUTO: 3 % — SIGNIFICANT CHANGE UP (ref 0–6)
HCT VFR BLD CALC: 29.1 % — LOW (ref 39–50)
HCT VFR BLD CALC: 29.1 % — LOW (ref 39–50)
HGB BLD-MCNC: 10 G/DL — LOW (ref 13–17)
HGB BLD-MCNC: 10 G/DL — LOW (ref 13–17)
LYMPHOCYTES # BLD AUTO: 1.01 K/UL — SIGNIFICANT CHANGE UP (ref 1–3.3)
LYMPHOCYTES # BLD AUTO: 1.01 K/UL — SIGNIFICANT CHANGE UP (ref 1–3.3)
LYMPHOCYTES # BLD AUTO: 65 % — HIGH (ref 13–44)
LYMPHOCYTES # BLD AUTO: 65 % — HIGH (ref 13–44)
MACROCYTES BLD QL: SLIGHT — SIGNIFICANT CHANGE UP
MACROCYTES BLD QL: SLIGHT — SIGNIFICANT CHANGE UP
MCHC RBC-ENTMCNC: 34.4 G/DL — SIGNIFICANT CHANGE UP (ref 32–36)
MCHC RBC-ENTMCNC: 34.4 G/DL — SIGNIFICANT CHANGE UP (ref 32–36)
MCHC RBC-ENTMCNC: 37.5 PG — HIGH (ref 27–34)
MCHC RBC-ENTMCNC: 37.5 PG — HIGH (ref 27–34)
MCV RBC AUTO: 109 FL — HIGH (ref 80–100)
MCV RBC AUTO: 109 FL — HIGH (ref 80–100)
MONOCYTES # BLD AUTO: 0.12 K/UL — SIGNIFICANT CHANGE UP (ref 0–0.9)
MONOCYTES # BLD AUTO: 0.12 K/UL — SIGNIFICANT CHANGE UP (ref 0–0.9)
MONOCYTES NFR BLD AUTO: 8 % — SIGNIFICANT CHANGE UP (ref 2–14)
MONOCYTES NFR BLD AUTO: 8 % — SIGNIFICANT CHANGE UP (ref 2–14)
MYELOCYTES NFR BLD: 1 % — HIGH (ref 0–0)
MYELOCYTES NFR BLD: 1 % — HIGH (ref 0–0)
NEUTROPHILS # BLD AUTO: 0.36 K/UL — LOW (ref 1.8–7.4)
NEUTROPHILS # BLD AUTO: 0.36 K/UL — LOW (ref 1.8–7.4)
NEUTROPHILS NFR BLD AUTO: 23 % — LOW (ref 43–77)
NEUTROPHILS NFR BLD AUTO: 23 % — LOW (ref 43–77)
NRBC # BLD: 0 /100 — SIGNIFICANT CHANGE UP (ref 0–0)
NRBC # BLD: 0 /100 — SIGNIFICANT CHANGE UP (ref 0–0)
NRBC # BLD: SIGNIFICANT CHANGE UP /100 WBCS (ref 0–0)
NRBC # BLD: SIGNIFICANT CHANGE UP /100 WBCS (ref 0–0)
PLAT MORPH BLD: NORMAL — SIGNIFICANT CHANGE UP
PLAT MORPH BLD: NORMAL — SIGNIFICANT CHANGE UP
PLATELET # BLD AUTO: 176 K/UL — SIGNIFICANT CHANGE UP (ref 150–400)
PLATELET # BLD AUTO: 176 K/UL — SIGNIFICANT CHANGE UP (ref 150–400)
POIKILOCYTOSIS BLD QL AUTO: SLIGHT — SIGNIFICANT CHANGE UP
POIKILOCYTOSIS BLD QL AUTO: SLIGHT — SIGNIFICANT CHANGE UP
POLYCHROMASIA BLD QL SMEAR: SLIGHT — SIGNIFICANT CHANGE UP
POLYCHROMASIA BLD QL SMEAR: SLIGHT — SIGNIFICANT CHANGE UP
RBC # BLD: 2.67 M/UL — LOW (ref 4.2–5.8)
RBC # BLD: 2.67 M/UL — LOW (ref 4.2–5.8)
RBC # FLD: 21.7 % — HIGH (ref 10.3–14.5)
RBC # FLD: 21.7 % — HIGH (ref 10.3–14.5)
RBC BLD AUTO: ABNORMAL
RBC BLD AUTO: ABNORMAL
WBC # BLD: 1.56 K/UL — LOW (ref 3.8–10.5)
WBC # BLD: 1.56 K/UL — LOW (ref 3.8–10.5)
WBC # FLD AUTO: 1.56 K/UL — LOW (ref 3.8–10.5)
WBC # FLD AUTO: 1.56 K/UL — LOW (ref 3.8–10.5)

## 2023-11-03 ENCOUNTER — APPOINTMENT (OUTPATIENT)
Dept: INFUSION THERAPY | Facility: HOSPITAL | Age: 83
End: 2023-11-03

## 2023-11-06 ENCOUNTER — APPOINTMENT (OUTPATIENT)
Dept: INFUSION THERAPY | Facility: HOSPITAL | Age: 83
End: 2023-11-06

## 2023-11-08 ENCOUNTER — APPOINTMENT (OUTPATIENT)
Dept: INFUSION THERAPY | Facility: HOSPITAL | Age: 83
End: 2023-11-08

## 2023-11-08 ENCOUNTER — RESULT REVIEW (OUTPATIENT)
Age: 83
End: 2023-11-08

## 2023-11-08 LAB
ANISOCYTOSIS BLD QL: SLIGHT — SIGNIFICANT CHANGE UP
ANISOCYTOSIS BLD QL: SLIGHT — SIGNIFICANT CHANGE UP
BASOPHILS # BLD AUTO: 0 K/UL — SIGNIFICANT CHANGE UP (ref 0–0.2)
BASOPHILS # BLD AUTO: 0 K/UL — SIGNIFICANT CHANGE UP (ref 0–0.2)
BASOPHILS NFR BLD AUTO: 0 % — SIGNIFICANT CHANGE UP (ref 0–2)
BASOPHILS NFR BLD AUTO: 0 % — SIGNIFICANT CHANGE UP (ref 0–2)
DACRYOCYTES BLD QL SMEAR: SLIGHT — SIGNIFICANT CHANGE UP
DACRYOCYTES BLD QL SMEAR: SLIGHT — SIGNIFICANT CHANGE UP
ELLIPTOCYTES BLD QL SMEAR: SLIGHT — SIGNIFICANT CHANGE UP
ELLIPTOCYTES BLD QL SMEAR: SLIGHT — SIGNIFICANT CHANGE UP
EOSINOPHIL # BLD AUTO: 0.07 K/UL — SIGNIFICANT CHANGE UP (ref 0–0.5)
EOSINOPHIL # BLD AUTO: 0.07 K/UL — SIGNIFICANT CHANGE UP (ref 0–0.5)
EOSINOPHIL NFR BLD AUTO: 3 % — SIGNIFICANT CHANGE UP (ref 0–6)
EOSINOPHIL NFR BLD AUTO: 3 % — SIGNIFICANT CHANGE UP (ref 0–6)
HCT VFR BLD CALC: 25.1 % — LOW (ref 39–50)
HCT VFR BLD CALC: 25.1 % — LOW (ref 39–50)
HGB BLD-MCNC: 9 G/DL — LOW (ref 13–17)
HGB BLD-MCNC: 9 G/DL — LOW (ref 13–17)
LYMPHOCYTES # BLD AUTO: 0.74 K/UL — LOW (ref 1–3.3)
LYMPHOCYTES # BLD AUTO: 0.74 K/UL — LOW (ref 1–3.3)
LYMPHOCYTES # BLD AUTO: 33 % — SIGNIFICANT CHANGE UP (ref 13–44)
LYMPHOCYTES # BLD AUTO: 33 % — SIGNIFICANT CHANGE UP (ref 13–44)
MACROCYTES BLD QL: SLIGHT — SIGNIFICANT CHANGE UP
MACROCYTES BLD QL: SLIGHT — SIGNIFICANT CHANGE UP
MCHC RBC-ENTMCNC: 35.9 G/DL — SIGNIFICANT CHANGE UP (ref 32–36)
MCHC RBC-ENTMCNC: 35.9 G/DL — SIGNIFICANT CHANGE UP (ref 32–36)
MCHC RBC-ENTMCNC: 38.3 PG — HIGH (ref 27–34)
MCHC RBC-ENTMCNC: 38.3 PG — HIGH (ref 27–34)
MCV RBC AUTO: 106.8 FL — HIGH (ref 80–100)
MCV RBC AUTO: 106.8 FL — HIGH (ref 80–100)
MONOCYTES # BLD AUTO: 0.16 K/UL — SIGNIFICANT CHANGE UP (ref 0–0.9)
MONOCYTES # BLD AUTO: 0.16 K/UL — SIGNIFICANT CHANGE UP (ref 0–0.9)
MONOCYTES NFR BLD AUTO: 7 % — SIGNIFICANT CHANGE UP (ref 2–14)
MONOCYTES NFR BLD AUTO: 7 % — SIGNIFICANT CHANGE UP (ref 2–14)
NEUTROPHILS # BLD AUTO: 1.28 K/UL — LOW (ref 1.8–7.4)
NEUTROPHILS # BLD AUTO: 1.28 K/UL — LOW (ref 1.8–7.4)
NEUTROPHILS NFR BLD AUTO: 57 % — SIGNIFICANT CHANGE UP (ref 43–77)
NEUTROPHILS NFR BLD AUTO: 57 % — SIGNIFICANT CHANGE UP (ref 43–77)
NRBC # BLD: 0 /100 — SIGNIFICANT CHANGE UP (ref 0–0)
NRBC # BLD: 0 /100 — SIGNIFICANT CHANGE UP (ref 0–0)
NRBC # BLD: SIGNIFICANT CHANGE UP /100 WBCS (ref 0–0)
NRBC # BLD: SIGNIFICANT CHANGE UP /100 WBCS (ref 0–0)
PLAT MORPH BLD: NORMAL — SIGNIFICANT CHANGE UP
PLAT MORPH BLD: NORMAL — SIGNIFICANT CHANGE UP
PLATELET # BLD AUTO: 92 K/UL — LOW (ref 150–400)
PLATELET # BLD AUTO: 92 K/UL — LOW (ref 150–400)
POIKILOCYTOSIS BLD QL AUTO: SLIGHT — SIGNIFICANT CHANGE UP
POIKILOCYTOSIS BLD QL AUTO: SLIGHT — SIGNIFICANT CHANGE UP
RBC # BLD: 2.35 M/UL — LOW (ref 4.2–5.8)
RBC # BLD: 2.35 M/UL — LOW (ref 4.2–5.8)
RBC # FLD: 20.4 % — HIGH (ref 10.3–14.5)
RBC # FLD: 20.4 % — HIGH (ref 10.3–14.5)
RBC BLD AUTO: ABNORMAL
RBC BLD AUTO: ABNORMAL
WBC # BLD: 2.25 K/UL — LOW (ref 3.8–10.5)
WBC # BLD: 2.25 K/UL — LOW (ref 3.8–10.5)
WBC # FLD AUTO: 2.25 K/UL — LOW (ref 3.8–10.5)
WBC # FLD AUTO: 2.25 K/UL — LOW (ref 3.8–10.5)

## 2023-11-10 ENCOUNTER — APPOINTMENT (OUTPATIENT)
Dept: INFUSION THERAPY | Facility: HOSPITAL | Age: 83
End: 2023-11-10

## 2023-11-10 ENCOUNTER — NON-APPOINTMENT (OUTPATIENT)
Age: 83
End: 2023-11-10

## 2023-11-10 LAB
APPEARANCE: CLEAR
BILIRUBIN URINE: NEGATIVE
BLOOD URINE: NEGATIVE
COLOR: YELLOW
GLUCOSE QUALITATIVE U: NEGATIVE MG/DL
KETONES URINE: NEGATIVE MG/DL
LEUKOCYTE ESTERASE URINE: ABNORMAL
NITRITE URINE: NEGATIVE
PH URINE: 7
PROTEIN URINE: 30 MG/DL
SPECIFIC GRAVITY URINE: 1.02
UROBILINOGEN URINE: 1 MG/DL

## 2023-11-13 ENCOUNTER — LABORATORY RESULT (OUTPATIENT)
Age: 83
End: 2023-11-13

## 2023-11-13 ENCOUNTER — RESULT REVIEW (OUTPATIENT)
Age: 83
End: 2023-11-13

## 2023-11-13 ENCOUNTER — APPOINTMENT (OUTPATIENT)
Dept: INTERNAL MEDICINE | Facility: CLINIC | Age: 83
End: 2023-11-13
Payer: MEDICARE

## 2023-11-13 ENCOUNTER — OUTPATIENT (OUTPATIENT)
Dept: OUTPATIENT SERVICES | Facility: HOSPITAL | Age: 83
LOS: 1 days | End: 2023-11-13
Payer: MEDICARE

## 2023-11-13 ENCOUNTER — APPOINTMENT (OUTPATIENT)
Dept: INFUSION THERAPY | Facility: HOSPITAL | Age: 83
End: 2023-11-13

## 2023-11-13 VITALS
BODY MASS INDEX: 28.25 KG/M2 | WEIGHT: 180 LBS | DIASTOLIC BLOOD PRESSURE: 74 MMHG | HEIGHT: 67 IN | SYSTOLIC BLOOD PRESSURE: 126 MMHG | HEART RATE: 73 BPM | OXYGEN SATURATION: 96 %

## 2023-11-13 DIAGNOSIS — Z86.79 PERSONAL HISTORY OF OTHER DISEASES OF THE CIRCULATORY SYSTEM: Chronic | ICD-10-CM

## 2023-11-13 DIAGNOSIS — D64.9 ANEMIA, UNSPECIFIED: ICD-10-CM

## 2023-11-13 DIAGNOSIS — Z90.79 ACQUIRED ABSENCE OF OTHER GENITAL ORGAN(S): Chronic | ICD-10-CM

## 2023-11-13 DIAGNOSIS — Z98.49 CATARACT EXTRACTION STATUS, UNSPECIFIED EYE: Chronic | ICD-10-CM

## 2023-11-13 DIAGNOSIS — Z98.890 OTHER SPECIFIED POSTPROCEDURAL STATES: Chronic | ICD-10-CM

## 2023-11-13 DIAGNOSIS — R60.0 LOCALIZED EDEMA: ICD-10-CM

## 2023-11-13 LAB
ANISOCYTOSIS BLD QL: SLIGHT — SIGNIFICANT CHANGE UP
ANISOCYTOSIS BLD QL: SLIGHT — SIGNIFICANT CHANGE UP
BASOPHILS # BLD AUTO: 0 K/UL — SIGNIFICANT CHANGE UP (ref 0–0.2)
BASOPHILS # BLD AUTO: 0 K/UL — SIGNIFICANT CHANGE UP (ref 0–0.2)
BASOPHILS NFR BLD AUTO: 0 % — SIGNIFICANT CHANGE UP (ref 0–2)
BASOPHILS NFR BLD AUTO: 0 % — SIGNIFICANT CHANGE UP (ref 0–2)
DACRYOCYTES BLD QL SMEAR: SLIGHT — SIGNIFICANT CHANGE UP
DACRYOCYTES BLD QL SMEAR: SLIGHT — SIGNIFICANT CHANGE UP
EOSINOPHIL # BLD AUTO: 0.16 K/UL — SIGNIFICANT CHANGE UP (ref 0–0.5)
EOSINOPHIL # BLD AUTO: 0.16 K/UL — SIGNIFICANT CHANGE UP (ref 0–0.5)
EOSINOPHIL NFR BLD AUTO: 7 % — HIGH (ref 0–6)
EOSINOPHIL NFR BLD AUTO: 7 % — HIGH (ref 0–6)
HCT VFR BLD CALC: 25.4 % — LOW (ref 39–50)
HCT VFR BLD CALC: 25.4 % — LOW (ref 39–50)
HGB BLD-MCNC: 9.2 G/DL — LOW (ref 13–17)
HGB BLD-MCNC: 9.2 G/DL — LOW (ref 13–17)
LYMPHOCYTES # BLD AUTO: 1.06 K/UL — SIGNIFICANT CHANGE UP (ref 1–3.3)
LYMPHOCYTES # BLD AUTO: 1.06 K/UL — SIGNIFICANT CHANGE UP (ref 1–3.3)
LYMPHOCYTES # BLD AUTO: 45 % — HIGH (ref 13–44)
LYMPHOCYTES # BLD AUTO: 45 % — HIGH (ref 13–44)
MACROCYTES BLD QL: SLIGHT — SIGNIFICANT CHANGE UP
MACROCYTES BLD QL: SLIGHT — SIGNIFICANT CHANGE UP
MCHC RBC-ENTMCNC: 36.2 G/DL — HIGH (ref 32–36)
MCHC RBC-ENTMCNC: 36.2 G/DL — HIGH (ref 32–36)
MCHC RBC-ENTMCNC: 39.1 PG — HIGH (ref 27–34)
MCHC RBC-ENTMCNC: 39.1 PG — HIGH (ref 27–34)
MCV RBC AUTO: 108.1 FL — HIGH (ref 80–100)
MCV RBC AUTO: 108.1 FL — HIGH (ref 80–100)
MONOCYTES # BLD AUTO: 0.12 K/UL — SIGNIFICANT CHANGE UP (ref 0–0.9)
MONOCYTES # BLD AUTO: 0.12 K/UL — SIGNIFICANT CHANGE UP (ref 0–0.9)
MONOCYTES NFR BLD AUTO: 5 % — SIGNIFICANT CHANGE UP (ref 2–14)
MONOCYTES NFR BLD AUTO: 5 % — SIGNIFICANT CHANGE UP (ref 2–14)
NEUTROPHILS # BLD AUTO: 1.01 K/UL — LOW (ref 1.8–7.4)
NEUTROPHILS # BLD AUTO: 1.01 K/UL — LOW (ref 1.8–7.4)
NEUTROPHILS NFR BLD AUTO: 43 % — SIGNIFICANT CHANGE UP (ref 43–77)
NEUTROPHILS NFR BLD AUTO: 43 % — SIGNIFICANT CHANGE UP (ref 43–77)
NRBC # BLD: 0 /100 — SIGNIFICANT CHANGE UP (ref 0–0)
NRBC # BLD: 0 /100 — SIGNIFICANT CHANGE UP (ref 0–0)
NRBC # BLD: SIGNIFICANT CHANGE UP /100 WBCS (ref 0–0)
NRBC # BLD: SIGNIFICANT CHANGE UP /100 WBCS (ref 0–0)
PLAT MORPH BLD: NORMAL — SIGNIFICANT CHANGE UP
PLAT MORPH BLD: NORMAL — SIGNIFICANT CHANGE UP
PLATELET # BLD AUTO: 58 K/UL — LOW (ref 150–400)
PLATELET # BLD AUTO: 58 K/UL — LOW (ref 150–400)
POIKILOCYTOSIS BLD QL AUTO: SLIGHT — SIGNIFICANT CHANGE UP
POIKILOCYTOSIS BLD QL AUTO: SLIGHT — SIGNIFICANT CHANGE UP
RBC # BLD: 2.35 M/UL — LOW (ref 4.2–5.8)
RBC # BLD: 2.35 M/UL — LOW (ref 4.2–5.8)
RBC # FLD: 20.1 % — HIGH (ref 10.3–14.5)
RBC # FLD: 20.1 % — HIGH (ref 10.3–14.5)
RBC BLD AUTO: ABNORMAL
RBC BLD AUTO: ABNORMAL
WBC # BLD: 2.35 K/UL — LOW (ref 3.8–10.5)
WBC # BLD: 2.35 K/UL — LOW (ref 3.8–10.5)
WBC # FLD AUTO: 2.35 K/UL — LOW (ref 3.8–10.5)
WBC # FLD AUTO: 2.35 K/UL — LOW (ref 3.8–10.5)

## 2023-11-13 PROCEDURE — 36415 COLL VENOUS BLD VENIPUNCTURE: CPT

## 2023-11-13 PROCEDURE — 99214 OFFICE O/P EST MOD 30 MIN: CPT | Mod: 25

## 2023-11-15 ENCOUNTER — APPOINTMENT (OUTPATIENT)
Dept: INFUSION THERAPY | Facility: HOSPITAL | Age: 83
End: 2023-11-15

## 2023-11-16 LAB
ANION GAP SERPL CALC-SCNC: 14 MMOL/L
APPEARANCE: CLEAR
BILIRUBIN URINE: NEGATIVE
BLOOD URINE: NEGATIVE
BUN SERPL-MCNC: 24 MG/DL
CALCIUM SERPL-MCNC: 8.5 MG/DL
CHLORIDE SERPL-SCNC: 105 MMOL/L
CO2 SERPL-SCNC: 21 MMOL/L
COLOR: YELLOW
CREAT SERPL-MCNC: 2.19 MG/DL
EGFR: 29 ML/MIN/1.73M2
ESTIMATED AVERAGE GLUCOSE: 105 MG/DL
GLUCOSE QUALITATIVE U: NEGATIVE MG/DL
GLUCOSE SERPL-MCNC: 151 MG/DL
HBA1C MFR BLD HPLC: 5.3 %
KETONES URINE: NEGATIVE MG/DL
LEUKOCYTE ESTERASE URINE: ABNORMAL
NITRITE URINE: NEGATIVE
PH URINE: 6.5
POTASSIUM SERPL-SCNC: 5.5 MMOL/L
PROTEIN URINE: NEGATIVE MG/DL
SODIUM SERPL-SCNC: 140 MMOL/L
SPECIFIC GRAVITY URINE: 1.01
UROBILINOGEN URINE: 0.2 MG/DL

## 2023-11-16 RX ORDER — SULFAMETHOXAZOLE AND TRIMETHOPRIM 800; 160 MG/1; MG/1
800-160 TABLET ORAL TWICE DAILY
Qty: 14 | Refills: 0 | Status: DISCONTINUED | COMMUNITY
Start: 2023-11-16 | End: 2023-11-16

## 2023-11-17 ENCOUNTER — APPOINTMENT (OUTPATIENT)
Dept: INFUSION THERAPY | Facility: HOSPITAL | Age: 83
End: 2023-11-17

## 2023-11-17 ENCOUNTER — RESULT REVIEW (OUTPATIENT)
Age: 83
End: 2023-11-17

## 2023-11-17 ENCOUNTER — TRANSCRIPTION ENCOUNTER (OUTPATIENT)
Age: 83
End: 2023-11-17

## 2023-11-17 LAB
ANISOCYTOSIS BLD QL: SLIGHT — SIGNIFICANT CHANGE UP
ANISOCYTOSIS BLD QL: SLIGHT — SIGNIFICANT CHANGE UP
BASOPHILS # BLD AUTO: 0.02 K/UL — SIGNIFICANT CHANGE UP (ref 0–0.2)
BASOPHILS # BLD AUTO: 0.02 K/UL — SIGNIFICANT CHANGE UP (ref 0–0.2)
BASOPHILS NFR BLD AUTO: 1 % — SIGNIFICANT CHANGE UP (ref 0–2)
BASOPHILS NFR BLD AUTO: 1 % — SIGNIFICANT CHANGE UP (ref 0–2)
DACRYOCYTES BLD QL SMEAR: SLIGHT — SIGNIFICANT CHANGE UP
DACRYOCYTES BLD QL SMEAR: SLIGHT — SIGNIFICANT CHANGE UP
ELLIPTOCYTES BLD QL SMEAR: SLIGHT — SIGNIFICANT CHANGE UP
ELLIPTOCYTES BLD QL SMEAR: SLIGHT — SIGNIFICANT CHANGE UP
EOSINOPHIL # BLD AUTO: 0.03 K/UL — SIGNIFICANT CHANGE UP (ref 0–0.5)
EOSINOPHIL # BLD AUTO: 0.03 K/UL — SIGNIFICANT CHANGE UP (ref 0–0.5)
EOSINOPHIL NFR BLD AUTO: 2 % — SIGNIFICANT CHANGE UP (ref 0–6)
EOSINOPHIL NFR BLD AUTO: 2 % — SIGNIFICANT CHANGE UP (ref 0–6)
HCT VFR BLD CALC: 21.5 % — LOW (ref 39–50)
HCT VFR BLD CALC: 21.5 % — LOW (ref 39–50)
HGB BLD-MCNC: 7.8 G/DL — LOW (ref 13–17)
HGB BLD-MCNC: 7.8 G/DL — LOW (ref 13–17)
HYPOCHROMIA BLD QL: SLIGHT — SIGNIFICANT CHANGE UP
HYPOCHROMIA BLD QL: SLIGHT — SIGNIFICANT CHANGE UP
LYMPHOCYTES # BLD AUTO: 1.04 K/UL — SIGNIFICANT CHANGE UP (ref 1–3.3)
LYMPHOCYTES # BLD AUTO: 1.04 K/UL — SIGNIFICANT CHANGE UP (ref 1–3.3)
LYMPHOCYTES # BLD AUTO: 63 % — HIGH (ref 13–44)
LYMPHOCYTES # BLD AUTO: 63 % — HIGH (ref 13–44)
MACROCYTES BLD QL: SLIGHT — SIGNIFICANT CHANGE UP
MACROCYTES BLD QL: SLIGHT — SIGNIFICANT CHANGE UP
MCHC RBC-ENTMCNC: 36.3 G/DL — HIGH (ref 32–36)
MCHC RBC-ENTMCNC: 36.3 G/DL — HIGH (ref 32–36)
MCHC RBC-ENTMCNC: 39 PG — HIGH (ref 27–34)
MCHC RBC-ENTMCNC: 39 PG — HIGH (ref 27–34)
MCV RBC AUTO: 107.5 FL — HIGH (ref 80–100)
MCV RBC AUTO: 107.5 FL — HIGH (ref 80–100)
MONOCYTES # BLD AUTO: 0.15 K/UL — SIGNIFICANT CHANGE UP (ref 0–0.9)
MONOCYTES # BLD AUTO: 0.15 K/UL — SIGNIFICANT CHANGE UP (ref 0–0.9)
MONOCYTES NFR BLD AUTO: 9 % — SIGNIFICANT CHANGE UP (ref 2–14)
MONOCYTES NFR BLD AUTO: 9 % — SIGNIFICANT CHANGE UP (ref 2–14)
NEUTROPHILS # BLD AUTO: 0.41 K/UL — LOW (ref 1.8–7.4)
NEUTROPHILS # BLD AUTO: 0.41 K/UL — LOW (ref 1.8–7.4)
NEUTROPHILS NFR BLD AUTO: 25 % — LOW (ref 43–77)
NEUTROPHILS NFR BLD AUTO: 25 % — LOW (ref 43–77)
NRBC # BLD: 0 /100 — SIGNIFICANT CHANGE UP (ref 0–0)
NRBC # BLD: 0 /100 — SIGNIFICANT CHANGE UP (ref 0–0)
NRBC # BLD: SIGNIFICANT CHANGE UP /100 WBCS (ref 0–0)
NRBC # BLD: SIGNIFICANT CHANGE UP /100 WBCS (ref 0–0)
PLAT MORPH BLD: NORMAL — SIGNIFICANT CHANGE UP
PLAT MORPH BLD: NORMAL — SIGNIFICANT CHANGE UP
PLATELET # BLD AUTO: 55 K/UL — LOW (ref 150–400)
PLATELET # BLD AUTO: 55 K/UL — LOW (ref 150–400)
POIKILOCYTOSIS BLD QL AUTO: SLIGHT — SIGNIFICANT CHANGE UP
POIKILOCYTOSIS BLD QL AUTO: SLIGHT — SIGNIFICANT CHANGE UP
RBC # BLD: 2 M/UL — LOW (ref 4.2–5.8)
RBC # BLD: 2 M/UL — LOW (ref 4.2–5.8)
RBC # FLD: 20.7 % — HIGH (ref 10.3–14.5)
RBC # FLD: 20.7 % — HIGH (ref 10.3–14.5)
RBC BLD AUTO: ABNORMAL
RBC BLD AUTO: ABNORMAL
WBC # BLD: 1.65 K/UL — LOW (ref 3.8–10.5)
WBC # BLD: 1.65 K/UL — LOW (ref 3.8–10.5)
WBC # FLD AUTO: 1.65 K/UL — LOW (ref 3.8–10.5)
WBC # FLD AUTO: 1.65 K/UL — LOW (ref 3.8–10.5)

## 2023-11-17 PROCEDURE — 86923 COMPATIBILITY TEST ELECTRIC: CPT

## 2023-11-17 PROCEDURE — 86901 BLOOD TYPING SEROLOGIC RH(D): CPT

## 2023-11-17 PROCEDURE — 86850 RBC ANTIBODY SCREEN: CPT

## 2023-11-17 PROCEDURE — 86900 BLOOD TYPING SEROLOGIC ABO: CPT

## 2023-11-20 ENCOUNTER — RESULT REVIEW (OUTPATIENT)
Age: 83
End: 2023-11-20

## 2023-11-20 ENCOUNTER — INPATIENT (INPATIENT)
Facility: HOSPITAL | Age: 83
LOS: 7 days | Discharge: HOME HEALTH | DRG: 280 | End: 2023-11-28
Attending: STUDENT IN AN ORGANIZED HEALTH CARE EDUCATION/TRAINING PROGRAM | Admitting: HOSPITALIST
Payer: MEDICARE

## 2023-11-20 ENCOUNTER — APPOINTMENT (OUTPATIENT)
Dept: INFUSION THERAPY | Facility: HOSPITAL | Age: 83
End: 2023-11-20

## 2023-11-20 ENCOUNTER — OUTPATIENT (OUTPATIENT)
Dept: OUTPATIENT SERVICES | Facility: HOSPITAL | Age: 83
LOS: 1 days | Discharge: HOME | End: 2023-11-20

## 2023-11-20 VITALS
HEIGHT: 67 IN | OXYGEN SATURATION: 92 % | RESPIRATION RATE: 22 BRPM | HEART RATE: 110 BPM | TEMPERATURE: 98 F | WEIGHT: 184.97 LBS | DIASTOLIC BLOOD PRESSURE: 71 MMHG | SYSTOLIC BLOOD PRESSURE: 130 MMHG

## 2023-11-20 DIAGNOSIS — Z98.49 CATARACT EXTRACTION STATUS, UNSPECIFIED EYE: Chronic | ICD-10-CM

## 2023-11-20 DIAGNOSIS — J96.01 ACUTE RESPIRATORY FAILURE WITH HYPOXIA: ICD-10-CM

## 2023-11-20 DIAGNOSIS — Z98.890 OTHER SPECIFIED POSTPROCEDURAL STATES: Chronic | ICD-10-CM

## 2023-11-20 DIAGNOSIS — D64.9 ANEMIA, UNSPECIFIED: ICD-10-CM

## 2023-11-20 DIAGNOSIS — Z86.79 PERSONAL HISTORY OF OTHER DISEASES OF THE CIRCULATORY SYSTEM: Chronic | ICD-10-CM

## 2023-11-20 DIAGNOSIS — D46.9 MYELODYSPLASTIC SYNDROME, UNSPECIFIED: ICD-10-CM

## 2023-11-20 DIAGNOSIS — Z90.79 ACQUIRED ABSENCE OF OTHER GENITAL ORGAN(S): Chronic | ICD-10-CM

## 2023-11-20 LAB
ALBUMIN SERPL ELPH-MCNC: 4.1 G/DL — SIGNIFICANT CHANGE UP (ref 3.3–5)
ALBUMIN SERPL ELPH-MCNC: 4.1 G/DL — SIGNIFICANT CHANGE UP (ref 3.3–5)
ALBUMIN SERPL ELPH-MCNC: 4.2 G/DL — SIGNIFICANT CHANGE UP (ref 3.3–5)
ALBUMIN SERPL ELPH-MCNC: 4.2 G/DL — SIGNIFICANT CHANGE UP (ref 3.3–5)
ALP SERPL-CCNC: 97 U/L — SIGNIFICANT CHANGE UP (ref 40–120)
ALT FLD-CCNC: 11 U/L — SIGNIFICANT CHANGE UP (ref 10–45)
ALT FLD-CCNC: 11 U/L — SIGNIFICANT CHANGE UP (ref 10–45)
ALT FLD-CCNC: 9 U/L — LOW (ref 10–45)
ALT FLD-CCNC: 9 U/L — LOW (ref 10–45)
ANION GAP SERPL CALC-SCNC: 11 MMOL/L — SIGNIFICANT CHANGE UP (ref 5–17)
ANION GAP SERPL CALC-SCNC: 11 MMOL/L — SIGNIFICANT CHANGE UP (ref 5–17)
ANION GAP SERPL CALC-SCNC: 13 MMOL/L — SIGNIFICANT CHANGE UP (ref 5–17)
ANION GAP SERPL CALC-SCNC: 13 MMOL/L — SIGNIFICANT CHANGE UP (ref 5–17)
ANISOCYTOSIS BLD QL: SIGNIFICANT CHANGE UP
ANISOCYTOSIS BLD QL: SIGNIFICANT CHANGE UP
ANISOCYTOSIS BLD QL: SLIGHT — SIGNIFICANT CHANGE UP
ANISOCYTOSIS BLD QL: SLIGHT — SIGNIFICANT CHANGE UP
APTT BLD: 26.8 SEC — SIGNIFICANT CHANGE UP (ref 24.5–35.6)
APTT BLD: 26.8 SEC — SIGNIFICANT CHANGE UP (ref 24.5–35.6)
AST SERPL-CCNC: 10 U/L — SIGNIFICANT CHANGE UP (ref 10–40)
AST SERPL-CCNC: 10 U/L — SIGNIFICANT CHANGE UP (ref 10–40)
AST SERPL-CCNC: 15 U/L — SIGNIFICANT CHANGE UP (ref 10–40)
AST SERPL-CCNC: 15 U/L — SIGNIFICANT CHANGE UP (ref 10–40)
BASE EXCESS BLDV CALC-SCNC: -4.8 MMOL/L — LOW (ref -2–3)
BASOPHILS # BLD AUTO: 0 K/UL — SIGNIFICANT CHANGE UP (ref 0–0.2)
BASOPHILS # BLD AUTO: 0 K/UL — SIGNIFICANT CHANGE UP (ref 0–0.2)
BASOPHILS # BLD AUTO: 0.02 K/UL — SIGNIFICANT CHANGE UP (ref 0–0.2)
BASOPHILS # BLD AUTO: 0.02 K/UL — SIGNIFICANT CHANGE UP (ref 0–0.2)
BASOPHILS NFR BLD AUTO: 0 % — SIGNIFICANT CHANGE UP (ref 0–2)
BASOPHILS NFR BLD AUTO: 0 % — SIGNIFICANT CHANGE UP (ref 0–2)
BASOPHILS NFR BLD AUTO: 2 % — SIGNIFICANT CHANGE UP (ref 0–2)
BASOPHILS NFR BLD AUTO: 2 % — SIGNIFICANT CHANGE UP (ref 0–2)
BILIRUB SERPL-MCNC: 0.5 MG/DL — SIGNIFICANT CHANGE UP (ref 0.2–1.2)
BILIRUB SERPL-MCNC: 0.5 MG/DL — SIGNIFICANT CHANGE UP (ref 0.2–1.2)
BILIRUB SERPL-MCNC: 0.6 MG/DL — SIGNIFICANT CHANGE UP (ref 0.2–1.2)
BILIRUB SERPL-MCNC: 0.6 MG/DL — SIGNIFICANT CHANGE UP (ref 0.2–1.2)
BLD GP AB SCN SERPL QL: NEGATIVE — SIGNIFICANT CHANGE UP
BLD GP AB SCN SERPL QL: NEGATIVE — SIGNIFICANT CHANGE UP
BUN SERPL-MCNC: 35 MG/DL — HIGH (ref 7–23)
BUN SERPL-MCNC: 35 MG/DL — HIGH (ref 7–23)
BUN SERPL-MCNC: 36 MG/DL — HIGH (ref 7–23)
BUN SERPL-MCNC: 36 MG/DL — HIGH (ref 7–23)
CA-I SERPL-SCNC: 1.18 MMOL/L — SIGNIFICANT CHANGE UP (ref 1.15–1.33)
CA-I SERPL-SCNC: 1.18 MMOL/L — SIGNIFICANT CHANGE UP (ref 1.15–1.33)
CA-I SERPL-SCNC: 1.19 MMOL/L — SIGNIFICANT CHANGE UP (ref 1.15–1.33)
CA-I SERPL-SCNC: 1.19 MMOL/L — SIGNIFICANT CHANGE UP (ref 1.15–1.33)
CALCIUM SERPL-MCNC: 9 MG/DL — SIGNIFICANT CHANGE UP (ref 8.4–10.5)
CHLORIDE BLDV-SCNC: 104 MMOL/L — SIGNIFICANT CHANGE UP (ref 96–108)
CHLORIDE BLDV-SCNC: 104 MMOL/L — SIGNIFICANT CHANGE UP (ref 96–108)
CHLORIDE BLDV-SCNC: 105 MMOL/L — SIGNIFICANT CHANGE UP (ref 96–108)
CHLORIDE BLDV-SCNC: 105 MMOL/L — SIGNIFICANT CHANGE UP (ref 96–108)
CHLORIDE SERPL-SCNC: 104 MMOL/L — SIGNIFICANT CHANGE UP (ref 96–108)
CHLORIDE SERPL-SCNC: 104 MMOL/L — SIGNIFICANT CHANGE UP (ref 96–108)
CHLORIDE SERPL-SCNC: 105 MMOL/L — SIGNIFICANT CHANGE UP (ref 96–108)
CHLORIDE SERPL-SCNC: 105 MMOL/L — SIGNIFICANT CHANGE UP (ref 96–108)
CO2 BLDV-SCNC: 22 MMOL/L — SIGNIFICANT CHANGE UP (ref 22–26)
CO2 SERPL-SCNC: 20 MMOL/L — LOW (ref 22–31)
CO2 SERPL-SCNC: 20 MMOL/L — LOW (ref 22–31)
CO2 SERPL-SCNC: 21 MMOL/L — LOW (ref 22–31)
CO2 SERPL-SCNC: 21 MMOL/L — LOW (ref 22–31)
CREAT SERPL-MCNC: 2.62 MG/DL — HIGH (ref 0.5–1.3)
CREAT SERPL-MCNC: 2.62 MG/DL — HIGH (ref 0.5–1.3)
CREAT SERPL-MCNC: 2.64 MG/DL — HIGH (ref 0.5–1.3)
CREAT SERPL-MCNC: 2.64 MG/DL — HIGH (ref 0.5–1.3)
DACRYOCYTES BLD QL SMEAR: SLIGHT — SIGNIFICANT CHANGE UP
EGFR: 23 ML/MIN/1.73M2 — LOW
EGFR: 23 ML/MIN/1.73M2 — LOW
EGFR: 24 ML/MIN/1.73M2 — LOW
EGFR: 24 ML/MIN/1.73M2 — LOW
ELLIPTOCYTES BLD QL SMEAR: SLIGHT — SIGNIFICANT CHANGE UP
EOSINOPHIL # BLD AUTO: 0 K/UL — SIGNIFICANT CHANGE UP (ref 0–0.5)
EOSINOPHIL # BLD AUTO: 0 K/UL — SIGNIFICANT CHANGE UP (ref 0–0.5)
EOSINOPHIL # BLD AUTO: 0.05 K/UL — SIGNIFICANT CHANGE UP (ref 0–0.5)
EOSINOPHIL # BLD AUTO: 0.05 K/UL — SIGNIFICANT CHANGE UP (ref 0–0.5)
EOSINOPHIL NFR BLD AUTO: 0 % — SIGNIFICANT CHANGE UP (ref 0–6)
EOSINOPHIL NFR BLD AUTO: 0 % — SIGNIFICANT CHANGE UP (ref 0–6)
EOSINOPHIL NFR BLD AUTO: 4 % — SIGNIFICANT CHANGE UP (ref 0–6)
EOSINOPHIL NFR BLD AUTO: 4 % — SIGNIFICANT CHANGE UP (ref 0–6)
FLUAV AG NPH QL: SIGNIFICANT CHANGE UP
FLUAV AG NPH QL: SIGNIFICANT CHANGE UP
FLUBV AG NPH QL: SIGNIFICANT CHANGE UP
FLUBV AG NPH QL: SIGNIFICANT CHANGE UP
GAS PNL BLDV: 135 MMOL/L — LOW (ref 136–145)
GAS PNL BLDV: SIGNIFICANT CHANGE UP
GLUCOSE BLDV-MCNC: 111 MG/DL — HIGH (ref 70–99)
GLUCOSE BLDV-MCNC: 111 MG/DL — HIGH (ref 70–99)
GLUCOSE BLDV-MCNC: 129 MG/DL — HIGH (ref 70–99)
GLUCOSE BLDV-MCNC: 129 MG/DL — HIGH (ref 70–99)
GLUCOSE SERPL-MCNC: 114 MG/DL — HIGH (ref 70–99)
GLUCOSE SERPL-MCNC: 114 MG/DL — HIGH (ref 70–99)
GLUCOSE SERPL-MCNC: 131 MG/DL — HIGH (ref 70–99)
GLUCOSE SERPL-MCNC: 131 MG/DL — HIGH (ref 70–99)
HCO3 BLDV-SCNC: 20 MMOL/L — LOW (ref 22–29)
HCT VFR BLD CALC: 22.9 % — LOW (ref 39–50)
HCT VFR BLD CALC: 22.9 % — LOW (ref 39–50)
HCT VFR BLD CALC: 23.7 % — LOW (ref 39–50)
HCT VFR BLD CALC: 23.7 % — LOW (ref 39–50)
HCT VFR BLDA CALC: 25 % — LOW (ref 39–51)
HCT VFR BLDA CALC: 25 % — LOW (ref 39–51)
HCT VFR BLDA CALC: 26 % — LOW (ref 39–51)
HCT VFR BLDA CALC: 26 % — LOW (ref 39–51)
HGB BLD CALC-MCNC: 8.2 G/DL — LOW (ref 12.6–17.4)
HGB BLD CALC-MCNC: 8.2 G/DL — LOW (ref 12.6–17.4)
HGB BLD CALC-MCNC: 8.5 G/DL — LOW (ref 12.6–17.4)
HGB BLD CALC-MCNC: 8.5 G/DL — LOW (ref 12.6–17.4)
HGB BLD-MCNC: 7.9 G/DL — LOW (ref 13–17)
HGB BLD-MCNC: 7.9 G/DL — LOW (ref 13–17)
HGB BLD-MCNC: 8.1 G/DL — LOW (ref 13–17)
HGB BLD-MCNC: 8.1 G/DL — LOW (ref 13–17)
HYPOCHROMIA BLD QL: SLIGHT — SIGNIFICANT CHANGE UP
HYPOCHROMIA BLD QL: SLIGHT — SIGNIFICANT CHANGE UP
INR BLD: 1.2 RATIO — HIGH (ref 0.85–1.18)
INR BLD: 1.2 RATIO — HIGH (ref 0.85–1.18)
LACTATE BLDV-MCNC: 1 MMOL/L — SIGNIFICANT CHANGE UP (ref 0.5–2)
LACTATE BLDV-MCNC: 1 MMOL/L — SIGNIFICANT CHANGE UP (ref 0.5–2)
LACTATE BLDV-MCNC: 1.6 MMOL/L — SIGNIFICANT CHANGE UP (ref 0.5–2)
LACTATE BLDV-MCNC: 1.6 MMOL/L — SIGNIFICANT CHANGE UP (ref 0.5–2)
LYMPHOCYTES # BLD AUTO: 0.79 K/UL — LOW (ref 1–3.3)
LYMPHOCYTES # BLD AUTO: 0.79 K/UL — LOW (ref 1–3.3)
LYMPHOCYTES # BLD AUTO: 1.19 K/UL — SIGNIFICANT CHANGE UP (ref 1–3.3)
LYMPHOCYTES # BLD AUTO: 1.19 K/UL — SIGNIFICANT CHANGE UP (ref 1–3.3)
LYMPHOCYTES # BLD AUTO: 64 % — HIGH (ref 13–44)
LYMPHOCYTES # BLD AUTO: 64 % — HIGH (ref 13–44)
LYMPHOCYTES # BLD AUTO: 73 % — HIGH (ref 13–44)
LYMPHOCYTES # BLD AUTO: 73 % — HIGH (ref 13–44)
MACROCYTES BLD QL: SIGNIFICANT CHANGE UP
MACROCYTES BLD QL: SIGNIFICANT CHANGE UP
MACROCYTES BLD QL: SLIGHT — SIGNIFICANT CHANGE UP
MACROCYTES BLD QL: SLIGHT — SIGNIFICANT CHANGE UP
MANUAL SMEAR VERIFICATION: SIGNIFICANT CHANGE UP
MANUAL SMEAR VERIFICATION: SIGNIFICANT CHANGE UP
MCHC RBC-ENTMCNC: 33.3 GM/DL — SIGNIFICANT CHANGE UP (ref 32–36)
MCHC RBC-ENTMCNC: 33.3 GM/DL — SIGNIFICANT CHANGE UP (ref 32–36)
MCHC RBC-ENTMCNC: 35.4 G/DL — SIGNIFICANT CHANGE UP (ref 32–36)
MCHC RBC-ENTMCNC: 35.4 G/DL — SIGNIFICANT CHANGE UP (ref 32–36)
MCHC RBC-ENTMCNC: 37.3 PG — HIGH (ref 27–34)
MCHC RBC-ENTMCNC: 37.3 PG — HIGH (ref 27–34)
MCHC RBC-ENTMCNC: 38.4 PG — HIGH (ref 27–34)
MCHC RBC-ENTMCNC: 38.4 PG — HIGH (ref 27–34)
MCV RBC AUTO: 108.5 FL — HIGH (ref 80–100)
MCV RBC AUTO: 108.5 FL — HIGH (ref 80–100)
MCV RBC AUTO: 111.8 FL — HIGH (ref 80–100)
MCV RBC AUTO: 111.8 FL — HIGH (ref 80–100)
MICROCYTES BLD QL: SLIGHT — SIGNIFICANT CHANGE UP
MICROCYTES BLD QL: SLIGHT — SIGNIFICANT CHANGE UP
MONOCYTES # BLD AUTO: 0.01 K/UL — SIGNIFICANT CHANGE UP (ref 0–0.9)
MONOCYTES # BLD AUTO: 0.01 K/UL — SIGNIFICANT CHANGE UP (ref 0–0.9)
MONOCYTES # BLD AUTO: 0.03 K/UL — SIGNIFICANT CHANGE UP (ref 0–0.9)
MONOCYTES # BLD AUTO: 0.03 K/UL — SIGNIFICANT CHANGE UP (ref 0–0.9)
MONOCYTES NFR BLD AUTO: 1 % — LOW (ref 2–14)
MONOCYTES NFR BLD AUTO: 1 % — LOW (ref 2–14)
MONOCYTES NFR BLD AUTO: 2 % — SIGNIFICANT CHANGE UP (ref 2–14)
MONOCYTES NFR BLD AUTO: 2 % — SIGNIFICANT CHANGE UP (ref 2–14)
NEUTROPHILS # BLD AUTO: 0.36 K/UL — LOW (ref 1.8–7.4)
NEUTROPHILS # BLD AUTO: 0.36 K/UL — LOW (ref 1.8–7.4)
NEUTROPHILS # BLD AUTO: 0.41 K/UL — LOW (ref 1.8–7.4)
NEUTROPHILS # BLD AUTO: 0.41 K/UL — LOW (ref 1.8–7.4)
NEUTROPHILS NFR BLD AUTO: 25 % — LOW (ref 43–77)
NEUTS BAND # BLD: 4 % — SIGNIFICANT CHANGE UP (ref 0–8)
NEUTS BAND # BLD: 4 % — SIGNIFICANT CHANGE UP (ref 0–8)
NRBC # BLD: 0 /100 — SIGNIFICANT CHANGE UP (ref 0–0)
NRBC # BLD: 0 /100 — SIGNIFICANT CHANGE UP (ref 0–0)
NRBC # BLD: 2 /100 — HIGH (ref 0–0)
NRBC # BLD: 2 /100 — HIGH (ref 0–0)
NRBC # BLD: SIGNIFICANT CHANGE UP /100 WBCS (ref 0–0)
NRBC # BLD: SIGNIFICANT CHANGE UP /100 WBCS (ref 0–0)
OVALOCYTES BLD QL SMEAR: SLIGHT — SIGNIFICANT CHANGE UP
OVALOCYTES BLD QL SMEAR: SLIGHT — SIGNIFICANT CHANGE UP
PCO2 BLDV: 37 MMHG — LOW (ref 42–55)
PH BLDV: 7.35 — SIGNIFICANT CHANGE UP (ref 7.32–7.43)
PLAT MORPH BLD: NORMAL — SIGNIFICANT CHANGE UP
PLATELET # BLD AUTO: 100 K/UL — LOW (ref 150–400)
PLATELET # BLD AUTO: 100 K/UL — LOW (ref 150–400)
PLATELET # BLD AUTO: 103 K/UL — LOW (ref 150–400)
PLATELET # BLD AUTO: 103 K/UL — LOW (ref 150–400)
PO2 BLDV: 49 MMHG — HIGH (ref 25–45)
PO2 BLDV: 49 MMHG — HIGH (ref 25–45)
PO2 BLDV: 52 MMHG — HIGH (ref 25–45)
PO2 BLDV: 52 MMHG — HIGH (ref 25–45)
POIKILOCYTOSIS BLD QL AUTO: SLIGHT — SIGNIFICANT CHANGE UP
POLYCHROMASIA BLD QL SMEAR: SLIGHT — SIGNIFICANT CHANGE UP
POLYCHROMASIA BLD QL SMEAR: SLIGHT — SIGNIFICANT CHANGE UP
POTASSIUM BLDV-SCNC: 4.7 MMOL/L — SIGNIFICANT CHANGE UP (ref 3.5–5.1)
POTASSIUM BLDV-SCNC: 4.7 MMOL/L — SIGNIFICANT CHANGE UP (ref 3.5–5.1)
POTASSIUM BLDV-SCNC: 5.3 MMOL/L — HIGH (ref 3.5–5.1)
POTASSIUM BLDV-SCNC: 5.3 MMOL/L — HIGH (ref 3.5–5.1)
POTASSIUM SERPL-MCNC: 4.6 MMOL/L — SIGNIFICANT CHANGE UP (ref 3.5–5.3)
POTASSIUM SERPL-MCNC: 4.6 MMOL/L — SIGNIFICANT CHANGE UP (ref 3.5–5.3)
POTASSIUM SERPL-MCNC: 4.8 MMOL/L — SIGNIFICANT CHANGE UP (ref 3.5–5.3)
POTASSIUM SERPL-MCNC: 4.8 MMOL/L — SIGNIFICANT CHANGE UP (ref 3.5–5.3)
POTASSIUM SERPL-SCNC: 4.6 MMOL/L — SIGNIFICANT CHANGE UP (ref 3.5–5.3)
POTASSIUM SERPL-SCNC: 4.6 MMOL/L — SIGNIFICANT CHANGE UP (ref 3.5–5.3)
POTASSIUM SERPL-SCNC: 4.8 MMOL/L — SIGNIFICANT CHANGE UP (ref 3.5–5.3)
POTASSIUM SERPL-SCNC: 4.8 MMOL/L — SIGNIFICANT CHANGE UP (ref 3.5–5.3)
PROT SERPL-MCNC: 6.7 G/DL — SIGNIFICANT CHANGE UP (ref 6–8.3)
PROT SERPL-MCNC: 6.7 G/DL — SIGNIFICANT CHANGE UP (ref 6–8.3)
PROT SERPL-MCNC: 7 G/DL — SIGNIFICANT CHANGE UP (ref 6–8.3)
PROT SERPL-MCNC: 7 G/DL — SIGNIFICANT CHANGE UP (ref 6–8.3)
PROTHROM AB SERPL-ACNC: 12.5 SEC — SIGNIFICANT CHANGE UP (ref 9.5–13)
PROTHROM AB SERPL-ACNC: 12.5 SEC — SIGNIFICANT CHANGE UP (ref 9.5–13)
RBC # BLD: 2.11 M/UL — LOW (ref 4.2–5.8)
RBC # BLD: 2.11 M/UL — LOW (ref 4.2–5.8)
RBC # BLD: 2.12 M/UL — LOW (ref 4.2–5.8)
RBC # BLD: 2.12 M/UL — LOW (ref 4.2–5.8)
RBC # FLD: 21.6 % — HIGH (ref 10.3–14.5)
RBC # FLD: 21.6 % — HIGH (ref 10.3–14.5)
RBC # FLD: 21.8 % — HIGH (ref 10.3–14.5)
RBC # FLD: 21.8 % — HIGH (ref 10.3–14.5)
RBC BLD AUTO: ABNORMAL
RH IG SCN BLD-IMP: NEGATIVE — SIGNIFICANT CHANGE UP
RH IG SCN BLD-IMP: NEGATIVE — SIGNIFICANT CHANGE UP
RSV RNA NPH QL NAA+NON-PROBE: SIGNIFICANT CHANGE UP
RSV RNA NPH QL NAA+NON-PROBE: SIGNIFICANT CHANGE UP
SAO2 % BLDV: 72.5 % — SIGNIFICANT CHANGE UP (ref 67–88)
SAO2 % BLDV: 72.5 % — SIGNIFICANT CHANGE UP (ref 67–88)
SAO2 % BLDV: 74.7 % — SIGNIFICANT CHANGE UP (ref 67–88)
SAO2 % BLDV: 74.7 % — SIGNIFICANT CHANGE UP (ref 67–88)
SARS-COV-2 RNA SPEC QL NAA+PROBE: SIGNIFICANT CHANGE UP
SARS-COV-2 RNA SPEC QL NAA+PROBE: SIGNIFICANT CHANGE UP
SODIUM SERPL-SCNC: 137 MMOL/L — SIGNIFICANT CHANGE UP (ref 135–145)
TROPONIN T, HIGH SENSITIVITY RESULT: 145 NG/L — HIGH (ref 0–51)
TROPONIN T, HIGH SENSITIVITY RESULT: 145 NG/L — HIGH (ref 0–51)
TROPONIN T, HIGH SENSITIVITY RESULT: 154 NG/L — HIGH (ref 0–51)
TROPONIN T, HIGH SENSITIVITY RESULT: 154 NG/L — HIGH (ref 0–51)
WBC # BLD: 1.24 K/UL — LOW (ref 3.8–10.5)
WBC # BLD: 1.24 K/UL — LOW (ref 3.8–10.5)
WBC # BLD: 1.63 K/UL — LOW (ref 3.8–10.5)
WBC # BLD: 1.63 K/UL — LOW (ref 3.8–10.5)
WBC # FLD AUTO: 1.24 K/UL — LOW (ref 3.8–10.5)
WBC # FLD AUTO: 1.24 K/UL — LOW (ref 3.8–10.5)
WBC # FLD AUTO: 1.63 K/UL — LOW (ref 3.8–10.5)
WBC # FLD AUTO: 1.63 K/UL — LOW (ref 3.8–10.5)

## 2023-11-20 PROCEDURE — 74174 CTA ABD&PLVS W/CONTRAST: CPT | Mod: 26,MA

## 2023-11-20 PROCEDURE — 71045 X-RAY EXAM CHEST 1 VIEW: CPT | Mod: 26

## 2023-11-20 PROCEDURE — 71275 CT ANGIOGRAPHY CHEST: CPT | Mod: 26,MA

## 2023-11-20 PROCEDURE — 99291 CRITICAL CARE FIRST HOUR: CPT

## 2023-11-20 RX ORDER — CEFEPIME 1 G/1
1000 INJECTION, POWDER, FOR SOLUTION INTRAMUSCULAR; INTRAVENOUS ONCE
Refills: 0 | Status: COMPLETED | OUTPATIENT
Start: 2023-11-20 | End: 2023-11-20

## 2023-11-20 RX ORDER — FUROSEMIDE 40 MG
60 TABLET ORAL ONCE
Refills: 0 | Status: COMPLETED | OUTPATIENT
Start: 2023-11-20 | End: 2023-11-20

## 2023-11-20 RX ADMIN — CEFEPIME 1000 MILLIGRAM(S): 1 INJECTION, POWDER, FOR SOLUTION INTRAMUSCULAR; INTRAVENOUS at 20:24

## 2023-11-20 RX ADMIN — Medication 60 MILLIGRAM(S): at 19:46

## 2023-11-20 RX ADMIN — CEFEPIME 100 MILLIGRAM(S): 1 INJECTION, POWDER, FOR SOLUTION INTRAMUSCULAR; INTRAVENOUS at 19:46

## 2023-11-20 NOTE — ED ADULT NURSE NOTE - SEPSIS REFERENCE DATA FOR CRITERIA 1 WBC
0300 - Reassessment completed. Vital signs documented. Lung sounds clear/diminished t/o. Patient sleeping with respirations easy and unlabored. Will continue to monitor. Abnormal WBC: < 4,000 OR > 12,000

## 2023-11-20 NOTE — ED ADULT NURSE REASSESSMENT NOTE - NS ED NURSE REASSESS COMMENT FT1
Report received from PARIS Leyva. Pt A&Ox3, IV intact and patent, sinus tachy, stating at 88% on 6L NC, MD Baker at bedside, pt c/o "acid reflux". Safety and comfort measures maintained.

## 2023-11-20 NOTE — ED ADULT NURSE REASSESSMENT NOTE - NS ED NURSE REASSESS COMMENT FT1
Report taken from MAU TOLEDO. Pt and aide at bedside introduced to oncoming RN and updated on plan of care. pt is A&OX4, VSS, bipap. Call bell in reach, pt educated on use. Bed locked and in lowest position. Denies any needs or complaints at this time.

## 2023-11-20 NOTE — ED PROVIDER NOTE - OBJECTIVE STATEMENT
83 y old morbidly obese male with history of hypertension ,HLD ,Gout prostatic cancer ,sp resection 3 y ago ,ascending aortic aneurysm  chronic AFIB and CAD ,GERD , ,9 mo ago  was diagnosed with MDS and macrocytic anemia  on chemo and blood transfusion ,stage 3 renal failure ,send today from hematology clinic with hypoxia ,ps oxymetry 88 % ,was started on oxygen and send to ED   He denies any chest pain or SOB , no fever chills ,co of difficulty walking because of numbness and parastases of both legs 83 y old morbidly obese male with history of hypertension ,HLD ,Gout prostatic cancer ,sp resection 3 y ago ,ascending aortic aneurysm  chronic AFIB and CAD ,GERD ,9 mo ago  was diagnosed with MDS and macrocytic anemia  on chemo and blood transfusion ,stage 3 renal failure ,send today from hematology clinic with hypoxia ,ps oxymetry 88 % ,was started on oxygen and send to ED   He denies any chest pain or SOB , no fever chills ,co of difficulty walking because of numbness and parastases of both legs

## 2023-11-20 NOTE — ED PROVIDER NOTE - PHYSICAL EXAMINATION
Gen: NAD, on 5L NC  HEENT: mucous membranes moist, conjunctival palpebral pallor  CV: tachycardic, +S1/S2, no M/R/G, 2+ radial pulses b/l  Resp: CTAB, no W/R/R, no accessory muscle use, no increased work of breathing  GI: Abdomen soft non-distended, NTTP  MSK: no LE edema  Neuro: A&Ox4, following commands, moving all four extremities spontaneously  Psych: appropriate mood

## 2023-11-20 NOTE — ED ADULT TRIAGE NOTE - CHIEF COMPLAINT QUOTE
shortness of breath   sent from [facility] for room air pulse ox of 82%, placed on 6L NC  was at [facility] for blood transfusion

## 2023-11-20 NOTE — ED ADULT NURSE NOTE - CHIEF COMPLAINT QUOTE
shortness of breath   sent from [facility] for room air pulse ox of 82%, placed on 6L NC  was at [facility] for blood transfusion

## 2023-11-20 NOTE — ED ADULT NURSE REASSESSMENT NOTE - NS ED NURSE REASSESS COMMENT FT1
Pt O2 stat 84% on 6LNC, pt lethargic, sleepy, diaphoretic, MD Baker at bedside. Pads placed on pt, crash cart pulled in room, nonrebreather 15L, second peripheral line placed labs drawn, respiratory called for BiPAP.     2015: Respiratory, 2 ED RNs, ED Tech, and ED MD at bedside. Pt placed on BiPAP stating 96%, talking and responding A&Ox3    2040: Pt at CT scan on pads, monitor, and BiPAP.

## 2023-11-20 NOTE — ED ADULT TRIAGE NOTE - NS ED NURSE AMBULANCES
Render Path Notes In Note?: No Anesthesia Type: 1% lidocaine with epinephrine Notification Instructions: Patient will be notified of biopsy results. However, patient instructed to call the office if not contacted within 2 weeks. Billing Type: Third-Party Bill Hemostasis: Drysol Was A Bandage Applied: Yes X Size Of Lesion In Cm: 0 Biopsy Method: Dermablade Anesthesia Volume In Cc (Will Not Render If 0): 0.5 Information: Selecting Yes will display possible errors in your note based on the variables you have selected. This validation is only offered as a suggestion for you. PLEASE NOTE THAT THE VALIDATION TEXT WILL BE REMOVED WHEN YOU FINALIZE YOUR NOTE. IF YOU WANT TO FAX A PRELIMINARY NOTE YOU WILL NEED TO TOGGLE THIS TO 'NO' IF YOU DO NOT WANT IT IN YOUR FAXED NOTE. Cryotherapy Text: The wound bed was treated with cryotherapy after the biopsy was performed. Detail Level: Detailed Consent: Written consent was obtained and risks were reviewed including but not limited to scarring, infection, bleeding, scabbing, incomplete removal, nerve damage and allergy to anesthesia. Wound Care: Petrolatum [facility] Dressing: bandage Type Of Destruction Used: Curettage Silver Nitrate Text: The wound bed was treated with silver nitrate after the biopsy was performed. Electrodesiccation Text: The wound bed was treated with electrodesiccation after the biopsy was performed. Biopsy Type: H and E Electrodesiccation And Curettage Text: The wound bed was treated with electrodesiccation and curettage after the biopsy was performed. Post-Care Instructions: I reviewed with the patient in detail post-care instructions. Patient is to keep the biopsy site dry overnight, and then apply bacitracin twice daily until healed. Patient may apply hydrogen peroxide soaks to remove any crusting. Curettage Text: The wound bed was treated with curettage after the biopsy was performed. Depth Of Biopsy: dermis

## 2023-11-20 NOTE — ED PROVIDER NOTE - CARE PLAN
Principal Discharge DX:	Acute respiratory failure with hypoxia   1 Principal Discharge DX:	Acute respiratory failure with hypoxia  Secondary Diagnosis:	Bilateral pleural effusion

## 2023-11-20 NOTE — ED PROVIDER NOTE - PROGRESS NOTE DETAILS
Jessie Wang, PGY-2 DO:  Patient CR elevated, patient in respiratory distress, concern for PE vs dissection, will scan the patient despite elevated CR, patient lethargic, discussion had with family and patient and agreeable to scan. Dr. Baker:  Pt s/o to me pending CTs for r/o PE and dissection. Had been put on hold due to Cr, but had not been discussed w ED provider to my knowledge. Pt treated for poss infection and poss CHF exac. Bedside US w BL plural effusions, plethoric IVC. Pt noting epigastric chest discomfort, however not initially in resp distress despite O2 sat 83-88% on 4L. Initial EKG non ischemic. Trop indet range. Will send repeat. Pt placed on NRB as he later started to show signs of resp distress. Repeat labs sent and resp called for BIPAP. Pt placed on monitor and expedited to CT. Pt and family aware of plan and concerns. Jessie Wang, PGY-2 DO:  Ctr negative for dissection and PE. patient to be admitted for hypoxic respiratory failure.

## 2023-11-20 NOTE — ED ADULT NURSE REASSESSMENT NOTE - NS ED NURSE REASSESS COMMENT FT1
Condom catheter unsuccessful. Indwelling sandhu cathter order placed by MD Baker for monitoring I's & O's of critically ill patient. Procedure explained to pt, pt verbalized understanding. Sandhu placed with RN Friend to ensure sterility. Pt tolerated procedure well, 500 cc of urine drained.

## 2023-11-20 NOTE — ED ADULT NURSE NOTE - NSFALLHARMRISKINTERV_ED_ALL_ED
Assistance OOB with selected safe patient handling equipment if applicable/Communicate risk of Fall with Harm to all staff, patient, and family/Provide visual cue: red socks, yellow wristband, yellow gown, etc/Reinforce activity limits and safety measures with patient and family/Bed in lowest position, wheels locked, appropriate side rails in place/Call bell, personal items and telephone in reach/Instruct patient to call for assistance before getting out of bed/chair/stretcher/Non-slip footwear applied when patient is off stretcher/[place] to call system/Physically safe environment - no spills, clutter or unnecessary equipment/Purposeful Proactive Rounding/Room/bathroom lighting operational, light cord in reach

## 2023-11-20 NOTE — ED ADULT NURSE NOTE - OBJECTIVE STATEMENT
Patient BIBA, accompanied by HHA. As per EMS patient was supposed to receive blood transfusion & clinic staff noted Oxygen saturation at 82% RA. Placed on 5-6 liters, increasing 02 sat to 90% & to 95%. Upon received patient alert & oriented x4, respiration at 22/min, easy & non-labored. Denies difficulty breathing, chest pain, dizziness. ST HR-114/min. On continuous cardiac monitoring. Seen & examined by MD. Afebrile- 98.4/ oral.

## 2023-11-20 NOTE — ED PROVIDER NOTE - CLINICAL SUMMARY MEDICAL DECISION MAKING FREE TEXT BOX
Ortega Otoole MD, PGY2  83-year-old male with past medical history of hypertension, hyperlipidemia, ascending aortic aneurysm, atrial fibrillation, CAD, GERD, MDS that was diagnosed 9 months ago on chemotherapy presenting to emergency department sent in by outpatient clinic for hypoxia to the low 80s on room air.  Patient was put on 5 L nasal cannula with oxygen saturations of 88%.  Was planned to have a blood transfusion today, patient unsure what his hemoglobin was.  Patient otherwise currently asymptomatic.  Denies fever, headache, cough, chest pain, shortness of breath, abdominal pain, nausea, vomiting, diarrhea, extremity edema.    In emergency department patient was saturating 88% on 5 L nasal cannula.  Rest of vital signs showing tachycardia to 115, normotensive, afebrile.  On exam patient has clear lung sounds bilaterally.  No extremity edema.  Differential including but not limited to pulmonary embolism, aortic dissection, anemia, electrolyte abnormalities, pneumonia, viral illness, ACS.  Went to obtain labs, CTA chest and abdomen, chest x-ray.  Will reassess.  Will likely require admission given oxygen requirement. 83-year-old male morbid obesity  with past medical history of hypertension, hyperlipidemia, ascending aortic aneurysm, atrial fibrillation, CAD, GERD, MDS that was diagnosed 9 months ago on chemotherapy presenting to emergency department sent in by outpatient clinic for hypoxia to the low 80s on room air.  Patient was put on 5 L nasal cannula with oxygen saturations of 88%.  Was planned to have a blood transfusion today, patient unsure what his hemoglobin was.  Patient otherwise currently asymptomatic.  Denies fever, headache, cough, chest pain, shortness of breath, abdominal pain, nausea, vomiting, diarrhea, extremity edema.    In emergency department patient was saturating 88% on 5 L nasal cannula.  Rest of vital signs showing tachycardia to 115, normotensive, afebrile.  On exam patient has clear lung sounds bilaterally.  No extremity edema.  Differential including but not limited to pulmonary embolism, aortic dissection, anemia, electrolyte abnormalities, pneumonia, viral illness, ACS.  Went to obtain labs, CTA chest and abdomen, chest x-ray.  Will reassess.  Will likely require admission given oxygen requirement. ZR

## 2023-11-21 ENCOUNTER — APPOINTMENT (OUTPATIENT)
Dept: ULTRASOUND IMAGING | Facility: CLINIC | Age: 83
End: 2023-11-21

## 2023-11-21 DIAGNOSIS — I48.20 CHRONIC ATRIAL FIBRILLATION, UNSPECIFIED: ICD-10-CM

## 2023-11-21 DIAGNOSIS — I73.9 PERIPHERAL VASCULAR DISEASE, UNSPECIFIED: ICD-10-CM

## 2023-11-21 DIAGNOSIS — Z29.9 ENCOUNTER FOR PROPHYLACTIC MEASURES, UNSPECIFIED: ICD-10-CM

## 2023-11-21 DIAGNOSIS — M10.9 GOUT, UNSPECIFIED: ICD-10-CM

## 2023-11-21 DIAGNOSIS — J96.01 ACUTE RESPIRATORY FAILURE WITH HYPOXIA: ICD-10-CM

## 2023-11-21 DIAGNOSIS — I47.19 OTHER SUPRAVENTRICULAR TACHYCARDIA: ICD-10-CM

## 2023-11-21 DIAGNOSIS — I10 ESSENTIAL (PRIMARY) HYPERTENSION: ICD-10-CM

## 2023-11-21 DIAGNOSIS — N17.9 ACUTE KIDNEY FAILURE, UNSPECIFIED: ICD-10-CM

## 2023-11-21 DIAGNOSIS — L03.116 CELLULITIS OF LEFT LOWER LIMB: ICD-10-CM

## 2023-11-21 DIAGNOSIS — R94.31 ABNORMAL ELECTROCARDIOGRAM [ECG] [EKG]: ICD-10-CM

## 2023-11-21 DIAGNOSIS — R79.89 OTHER SPECIFIED ABNORMAL FINDINGS OF BLOOD CHEMISTRY: ICD-10-CM

## 2023-11-21 DIAGNOSIS — D46.9 MYELODYSPLASTIC SYNDROME, UNSPECIFIED: ICD-10-CM

## 2023-11-21 LAB
A1C WITH ESTIMATED AVERAGE GLUCOSE RESULT: 5.5 % — SIGNIFICANT CHANGE UP (ref 4–5.6)
A1C WITH ESTIMATED AVERAGE GLUCOSE RESULT: 5.5 % — SIGNIFICANT CHANGE UP (ref 4–5.6)
ALBUMIN SERPL ELPH-MCNC: 3.9 G/DL — SIGNIFICANT CHANGE UP (ref 3.3–5)
ALBUMIN SERPL ELPH-MCNC: 3.9 G/DL — SIGNIFICANT CHANGE UP (ref 3.3–5)
ALP SERPL-CCNC: 94 U/L — SIGNIFICANT CHANGE UP (ref 40–120)
ALP SERPL-CCNC: 94 U/L — SIGNIFICANT CHANGE UP (ref 40–120)
ALT FLD-CCNC: 7 U/L — LOW (ref 10–45)
ALT FLD-CCNC: 7 U/L — LOW (ref 10–45)
ANION GAP SERPL CALC-SCNC: 16 MMOL/L — SIGNIFICANT CHANGE UP (ref 5–17)
ANION GAP SERPL CALC-SCNC: 16 MMOL/L — SIGNIFICANT CHANGE UP (ref 5–17)
ANISOCYTOSIS BLD QL: SIGNIFICANT CHANGE UP
ANISOCYTOSIS BLD QL: SIGNIFICANT CHANGE UP
APPEARANCE UR: CLEAR — SIGNIFICANT CHANGE UP
APPEARANCE UR: CLEAR — SIGNIFICANT CHANGE UP
AST SERPL-CCNC: 13 U/L — SIGNIFICANT CHANGE UP (ref 10–40)
AST SERPL-CCNC: 13 U/L — SIGNIFICANT CHANGE UP (ref 10–40)
BACTERIA # UR AUTO: NEGATIVE /HPF — SIGNIFICANT CHANGE UP
BACTERIA # UR AUTO: NEGATIVE /HPF — SIGNIFICANT CHANGE UP
BASOPHILS # BLD AUTO: 0.02 K/UL — SIGNIFICANT CHANGE UP (ref 0–0.2)
BASOPHILS # BLD AUTO: 0.02 K/UL — SIGNIFICANT CHANGE UP (ref 0–0.2)
BASOPHILS NFR BLD AUTO: 1.7 % — SIGNIFICANT CHANGE UP (ref 0–2)
BASOPHILS NFR BLD AUTO: 1.7 % — SIGNIFICANT CHANGE UP (ref 0–2)
BILIRUB SERPL-MCNC: 0.4 MG/DL — SIGNIFICANT CHANGE UP (ref 0.2–1.2)
BILIRUB SERPL-MCNC: 0.4 MG/DL — SIGNIFICANT CHANGE UP (ref 0.2–1.2)
BILIRUB UR-MCNC: NEGATIVE — SIGNIFICANT CHANGE UP
BILIRUB UR-MCNC: NEGATIVE — SIGNIFICANT CHANGE UP
BUN SERPL-MCNC: 35 MG/DL — HIGH (ref 7–23)
BUN SERPL-MCNC: 35 MG/DL — HIGH (ref 7–23)
CALCIUM SERPL-MCNC: 8.8 MG/DL — SIGNIFICANT CHANGE UP (ref 8.4–10.5)
CALCIUM SERPL-MCNC: 8.8 MG/DL — SIGNIFICANT CHANGE UP (ref 8.4–10.5)
CAST: 9 /LPF — HIGH (ref 0–4)
CAST: 9 /LPF — HIGH (ref 0–4)
CHLORIDE SERPL-SCNC: 103 MMOL/L — SIGNIFICANT CHANGE UP (ref 96–108)
CHLORIDE SERPL-SCNC: 103 MMOL/L — SIGNIFICANT CHANGE UP (ref 96–108)
CHLORIDE UR-SCNC: 94 MMOL/L — SIGNIFICANT CHANGE UP
CHLORIDE UR-SCNC: 94 MMOL/L — SIGNIFICANT CHANGE UP
CO2 SERPL-SCNC: 19 MMOL/L — LOW (ref 22–31)
CO2 SERPL-SCNC: 19 MMOL/L — LOW (ref 22–31)
COARSE GRAN CASTS #/AREA URNS AUTO: PRESENT
COARSE GRAN CASTS #/AREA URNS AUTO: PRESENT
COLOR SPEC: YELLOW — SIGNIFICANT CHANGE UP
COLOR SPEC: YELLOW — SIGNIFICANT CHANGE UP
CREAT ?TM UR-MCNC: 39 MG/DL — SIGNIFICANT CHANGE UP
CREAT ?TM UR-MCNC: 39 MG/DL — SIGNIFICANT CHANGE UP
CREAT SERPL-MCNC: 2.84 MG/DL — HIGH (ref 0.5–1.3)
CREAT SERPL-MCNC: 2.84 MG/DL — HIGH (ref 0.5–1.3)
DACRYOCYTES BLD QL SMEAR: SLIGHT — SIGNIFICANT CHANGE UP
DACRYOCYTES BLD QL SMEAR: SLIGHT — SIGNIFICANT CHANGE UP
DIFF PNL FLD: ABNORMAL
DIFF PNL FLD: ABNORMAL
EGFR: 21 ML/MIN/1.73M2 — LOW
EGFR: 21 ML/MIN/1.73M2 — LOW
ELLIPTOCYTES BLD QL SMEAR: SLIGHT — SIGNIFICANT CHANGE UP
ELLIPTOCYTES BLD QL SMEAR: SLIGHT — SIGNIFICANT CHANGE UP
EOSINOPHIL # BLD AUTO: 0.05 K/UL — SIGNIFICANT CHANGE UP (ref 0–0.5)
EOSINOPHIL # BLD AUTO: 0.05 K/UL — SIGNIFICANT CHANGE UP (ref 0–0.5)
EOSINOPHIL NFR BLD AUTO: 5.3 % — SIGNIFICANT CHANGE UP (ref 0–6)
EOSINOPHIL NFR BLD AUTO: 5.3 % — SIGNIFICANT CHANGE UP (ref 0–6)
ESTIMATED AVERAGE GLUCOSE: 111 MG/DL — SIGNIFICANT CHANGE UP (ref 68–114)
ESTIMATED AVERAGE GLUCOSE: 111 MG/DL — SIGNIFICANT CHANGE UP (ref 68–114)
GLUCOSE SERPL-MCNC: 123 MG/DL — HIGH (ref 70–99)
GLUCOSE SERPL-MCNC: 123 MG/DL — HIGH (ref 70–99)
GLUCOSE UR QL: NEGATIVE MG/DL — SIGNIFICANT CHANGE UP
GLUCOSE UR QL: NEGATIVE MG/DL — SIGNIFICANT CHANGE UP
HCT VFR BLD CALC: 22.2 % — LOW (ref 39–50)
HCT VFR BLD CALC: 22.2 % — LOW (ref 39–50)
HGB BLD-MCNC: 7.5 G/DL — LOW (ref 13–17)
HGB BLD-MCNC: 7.5 G/DL — LOW (ref 13–17)
HYALINE CASTS # UR AUTO: PRESENT
HYALINE CASTS # UR AUTO: PRESENT
KETONES UR-MCNC: NEGATIVE MG/DL — SIGNIFICANT CHANGE UP
KETONES UR-MCNC: NEGATIVE MG/DL — SIGNIFICANT CHANGE UP
LEUKOCYTE ESTERASE UR-ACNC: ABNORMAL
LEUKOCYTE ESTERASE UR-ACNC: ABNORMAL
LYMPHOCYTES # BLD AUTO: 0.64 K/UL — LOW (ref 1–3.3)
LYMPHOCYTES # BLD AUTO: 0.64 K/UL — LOW (ref 1–3.3)
LYMPHOCYTES # BLD AUTO: 66.4 % — HIGH (ref 13–44)
LYMPHOCYTES # BLD AUTO: 66.4 % — HIGH (ref 13–44)
MACROCYTES BLD QL: SLIGHT — SIGNIFICANT CHANGE UP
MACROCYTES BLD QL: SLIGHT — SIGNIFICANT CHANGE UP
MAGNESIUM SERPL-MCNC: 2.4 MG/DL — SIGNIFICANT CHANGE UP (ref 1.6–2.6)
MAGNESIUM SERPL-MCNC: 2.4 MG/DL — SIGNIFICANT CHANGE UP (ref 1.6–2.6)
MANUAL SMEAR VERIFICATION: SIGNIFICANT CHANGE UP
MANUAL SMEAR VERIFICATION: SIGNIFICANT CHANGE UP
MCHC RBC-ENTMCNC: 33.8 GM/DL — SIGNIFICANT CHANGE UP (ref 32–36)
MCHC RBC-ENTMCNC: 33.8 GM/DL — SIGNIFICANT CHANGE UP (ref 32–36)
MCHC RBC-ENTMCNC: 37.5 PG — HIGH (ref 27–34)
MCHC RBC-ENTMCNC: 37.5 PG — HIGH (ref 27–34)
MCV RBC AUTO: 111 FL — HIGH (ref 80–100)
MCV RBC AUTO: 111 FL — HIGH (ref 80–100)
MONOCYTES # BLD AUTO: 0.02 K/UL — SIGNIFICANT CHANGE UP (ref 0–0.9)
MONOCYTES # BLD AUTO: 0.02 K/UL — SIGNIFICANT CHANGE UP (ref 0–0.9)
MONOCYTES NFR BLD AUTO: 1.8 % — LOW (ref 2–14)
MONOCYTES NFR BLD AUTO: 1.8 % — LOW (ref 2–14)
NEUTROPHILS # BLD AUTO: 0.24 K/UL — LOW (ref 1.8–7.4)
NEUTROPHILS # BLD AUTO: 0.24 K/UL — LOW (ref 1.8–7.4)
NEUTROPHILS NFR BLD AUTO: 24.8 % — LOW (ref 43–77)
NEUTROPHILS NFR BLD AUTO: 24.8 % — LOW (ref 43–77)
NITRITE UR-MCNC: NEGATIVE — SIGNIFICANT CHANGE UP
NITRITE UR-MCNC: NEGATIVE — SIGNIFICANT CHANGE UP
NRBC # BLD: 3 /100 — HIGH (ref 0–0)
NRBC # BLD: 3 /100 — HIGH (ref 0–0)
NT-PROBNP SERPL-SCNC: 6702 PG/ML — HIGH (ref 0–300)
NT-PROBNP SERPL-SCNC: 6702 PG/ML — HIGH (ref 0–300)
PH UR: 5 — SIGNIFICANT CHANGE UP (ref 5–8)
PH UR: 5 — SIGNIFICANT CHANGE UP (ref 5–8)
PHOSPHATE SERPL-MCNC: 4.3 MG/DL — SIGNIFICANT CHANGE UP (ref 2.5–4.5)
PHOSPHATE SERPL-MCNC: 4.3 MG/DL — SIGNIFICANT CHANGE UP (ref 2.5–4.5)
PLAT MORPH BLD: NORMAL — SIGNIFICANT CHANGE UP
PLAT MORPH BLD: NORMAL — SIGNIFICANT CHANGE UP
PLATELET # BLD AUTO: 100 K/UL — LOW (ref 150–400)
PLATELET # BLD AUTO: 100 K/UL — LOW (ref 150–400)
POIKILOCYTOSIS BLD QL AUTO: SIGNIFICANT CHANGE UP
POIKILOCYTOSIS BLD QL AUTO: SIGNIFICANT CHANGE UP
POLYCHROMASIA BLD QL SMEAR: SLIGHT — SIGNIFICANT CHANGE UP
POLYCHROMASIA BLD QL SMEAR: SLIGHT — SIGNIFICANT CHANGE UP
POTASSIUM SERPL-MCNC: 4.5 MMOL/L — SIGNIFICANT CHANGE UP (ref 3.5–5.3)
POTASSIUM SERPL-MCNC: 4.5 MMOL/L — SIGNIFICANT CHANGE UP (ref 3.5–5.3)
POTASSIUM SERPL-SCNC: 4.5 MMOL/L — SIGNIFICANT CHANGE UP (ref 3.5–5.3)
POTASSIUM SERPL-SCNC: 4.5 MMOL/L — SIGNIFICANT CHANGE UP (ref 3.5–5.3)
POTASSIUM UR-SCNC: 37 MMOL/L — SIGNIFICANT CHANGE UP
POTASSIUM UR-SCNC: 37 MMOL/L — SIGNIFICANT CHANGE UP
PROT SERPL-MCNC: 6.4 G/DL — SIGNIFICANT CHANGE UP (ref 6–8.3)
PROT SERPL-MCNC: 6.4 G/DL — SIGNIFICANT CHANGE UP (ref 6–8.3)
PROT UR-MCNC: 30 MG/DL
PROT UR-MCNC: 30 MG/DL
RBC # BLD: 2 M/UL — LOW (ref 4.2–5.8)
RBC # BLD: 2 M/UL — LOW (ref 4.2–5.8)
RBC # FLD: 22.1 % — HIGH (ref 10.3–14.5)
RBC # FLD: 22.1 % — HIGH (ref 10.3–14.5)
RBC BLD AUTO: ABNORMAL
RBC BLD AUTO: ABNORMAL
RBC CASTS # UR COMP ASSIST: 1 /HPF — SIGNIFICANT CHANGE UP (ref 0–4)
RBC CASTS # UR COMP ASSIST: 1 /HPF — SIGNIFICANT CHANGE UP (ref 0–4)
REVIEW: SIGNIFICANT CHANGE UP
REVIEW: SIGNIFICANT CHANGE UP
SODIUM SERPL-SCNC: 138 MMOL/L — SIGNIFICANT CHANGE UP (ref 135–145)
SODIUM SERPL-SCNC: 138 MMOL/L — SIGNIFICANT CHANGE UP (ref 135–145)
SODIUM UR-SCNC: 80 MMOL/L — SIGNIFICANT CHANGE UP
SODIUM UR-SCNC: 80 MMOL/L — SIGNIFICANT CHANGE UP
SP GR SPEC: 1.02 — SIGNIFICANT CHANGE UP (ref 1–1.03)
SP GR SPEC: 1.02 — SIGNIFICANT CHANGE UP (ref 1–1.03)
SQUAMOUS # UR AUTO: 2 /HPF — SIGNIFICANT CHANGE UP (ref 0–5)
SQUAMOUS # UR AUTO: 2 /HPF — SIGNIFICANT CHANGE UP (ref 0–5)
UROBILINOGEN FLD QL: 0.2 MG/DL — SIGNIFICANT CHANGE UP (ref 0.2–1)
UROBILINOGEN FLD QL: 0.2 MG/DL — SIGNIFICANT CHANGE UP (ref 0.2–1)
WBC # BLD: 0.96 K/UL — CRITICAL LOW (ref 3.8–10.5)
WBC # BLD: 0.96 K/UL — CRITICAL LOW (ref 3.8–10.5)
WBC # FLD AUTO: 0.96 K/UL — CRITICAL LOW (ref 3.8–10.5)
WBC # FLD AUTO: 0.96 K/UL — CRITICAL LOW (ref 3.8–10.5)
WBC UR QL: 3 /HPF — SIGNIFICANT CHANGE UP (ref 0–5)
WBC UR QL: 3 /HPF — SIGNIFICANT CHANGE UP (ref 0–5)

## 2023-11-21 PROCEDURE — 99497 ADVNCD CARE PLAN 30 MIN: CPT | Mod: 25

## 2023-11-21 PROCEDURE — 99223 1ST HOSP IP/OBS HIGH 75: CPT | Mod: GC

## 2023-11-21 PROCEDURE — 93306 TTE W/DOPPLER COMPLETE: CPT | Mod: 26

## 2023-11-21 PROCEDURE — 99223 1ST HOSP IP/OBS HIGH 75: CPT | Mod: GC,25

## 2023-11-21 RX ORDER — METOPROLOL TARTRATE 50 MG
50 TABLET ORAL DAILY
Refills: 0 | Status: DISCONTINUED | OUTPATIENT
Start: 2023-11-21 | End: 2023-11-21

## 2023-11-21 RX ORDER — GABAPENTIN 400 MG/1
300 CAPSULE ORAL
Refills: 0 | Status: DISCONTINUED | OUTPATIENT
Start: 2023-11-21 | End: 2023-11-23

## 2023-11-21 RX ORDER — PANTOPRAZOLE SODIUM 20 MG/1
40 TABLET, DELAYED RELEASE ORAL
Refills: 0 | Status: DISCONTINUED | OUTPATIENT
Start: 2023-11-21 | End: 2023-11-28

## 2023-11-21 RX ORDER — CYCLOBENZAPRINE HYDROCHLORIDE 10 MG/1
5 TABLET, FILM COATED ORAL THREE TIMES A DAY
Refills: 0 | Status: DISCONTINUED | OUTPATIENT
Start: 2023-11-21 | End: 2023-11-28

## 2023-11-21 RX ORDER — GABAPENTIN 400 MG/1
300 CAPSULE ORAL THREE TIMES A DAY
Refills: 0 | Status: DISCONTINUED | OUTPATIENT
Start: 2023-11-21 | End: 2023-11-21

## 2023-11-21 RX ORDER — ACYCLOVIR SODIUM 500 MG
400 VIAL (EA) INTRAVENOUS
Refills: 0 | Status: DISCONTINUED | OUTPATIENT
Start: 2023-11-21 | End: 2023-11-23

## 2023-11-21 RX ORDER — CEFEPIME 1 G/1
INJECTION, POWDER, FOR SOLUTION INTRAMUSCULAR; INTRAVENOUS
Refills: 0 | Status: DISCONTINUED | OUTPATIENT
Start: 2023-11-21 | End: 2023-11-22

## 2023-11-21 RX ORDER — FLUCONAZOLE 150 MG/1
200 TABLET ORAL DAILY
Refills: 0 | Status: DISCONTINUED | OUTPATIENT
Start: 2023-11-21 | End: 2023-11-21

## 2023-11-21 RX ORDER — CEFEPIME 1 G/1
2000 INJECTION, POWDER, FOR SOLUTION INTRAMUSCULAR; INTRAVENOUS EVERY 24 HOURS
Refills: 0 | Status: DISCONTINUED | OUTPATIENT
Start: 2023-11-22 | End: 2023-11-22

## 2023-11-21 RX ORDER — ASPIRIN/CALCIUM CARB/MAGNESIUM 324 MG
81 TABLET ORAL DAILY
Refills: 0 | Status: DISCONTINUED | OUTPATIENT
Start: 2023-11-21 | End: 2023-11-28

## 2023-11-21 RX ORDER — SERTRALINE 25 MG/1
25 TABLET, FILM COATED ORAL DAILY
Refills: 0 | Status: DISCONTINUED | OUTPATIENT
Start: 2023-11-21 | End: 2023-11-28

## 2023-11-21 RX ORDER — ATORVASTATIN CALCIUM 80 MG/1
40 TABLET, FILM COATED ORAL AT BEDTIME
Refills: 0 | Status: DISCONTINUED | OUTPATIENT
Start: 2023-11-21 | End: 2023-11-23

## 2023-11-21 RX ORDER — ALLOPURINOL 300 MG
100 TABLET ORAL DAILY
Refills: 0 | Status: DISCONTINUED | OUTPATIENT
Start: 2023-11-21 | End: 2023-11-21

## 2023-11-21 RX ORDER — FUROSEMIDE 40 MG
40 TABLET ORAL EVERY 12 HOURS
Refills: 0 | Status: DISCONTINUED | OUTPATIENT
Start: 2023-11-21 | End: 2023-11-22

## 2023-11-21 RX ORDER — ROSUVASTATIN CALCIUM 5 MG/1
1 TABLET ORAL
Refills: 0 | DISCHARGE

## 2023-11-21 RX ORDER — CEFEPIME 1 G/1
2000 INJECTION, POWDER, FOR SOLUTION INTRAMUSCULAR; INTRAVENOUS ONCE
Refills: 0 | Status: COMPLETED | OUTPATIENT
Start: 2023-11-21 | End: 2023-11-21

## 2023-11-21 RX ORDER — ACYCLOVIR SODIUM 500 MG
1 VIAL (EA) INTRAVENOUS
Refills: 0 | DISCHARGE

## 2023-11-21 RX ORDER — SERTRALINE 25 MG/1
1 TABLET, FILM COATED ORAL
Refills: 0 | DISCHARGE

## 2023-11-21 RX ORDER — CILOSTAZOL 100 MG/1
100 TABLET ORAL
Refills: 0 | Status: DISCONTINUED | OUTPATIENT
Start: 2023-11-21 | End: 2023-11-21

## 2023-11-21 RX ORDER — CLOPIDOGREL BISULFATE 75 MG/1
75 TABLET, FILM COATED ORAL DAILY
Refills: 0 | Status: DISCONTINUED | OUTPATIENT
Start: 2023-11-21 | End: 2023-11-28

## 2023-11-21 RX ORDER — METOPROLOL TARTRATE 50 MG
50 TABLET ORAL ONCE
Refills: 0 | Status: COMPLETED | OUTPATIENT
Start: 2023-11-21 | End: 2023-11-21

## 2023-11-21 RX ORDER — FUROSEMIDE 40 MG
80 TABLET ORAL
Refills: 0 | Status: DISCONTINUED | OUTPATIENT
Start: 2023-11-21 | End: 2023-11-21

## 2023-11-21 RX ADMIN — Medication 30 MILLILITER(S): at 22:10

## 2023-11-21 RX ADMIN — Medication 50 MILLIGRAM(S): at 05:35

## 2023-11-21 RX ADMIN — CLOPIDOGREL BISULFATE 75 MILLIGRAM(S): 75 TABLET, FILM COATED ORAL at 12:22

## 2023-11-21 RX ADMIN — Medication 80 MILLIGRAM(S): at 05:35

## 2023-11-21 RX ADMIN — CILOSTAZOL 100 MILLIGRAM(S): 100 TABLET ORAL at 05:35

## 2023-11-21 RX ADMIN — PANTOPRAZOLE SODIUM 40 MILLIGRAM(S): 20 TABLET, DELAYED RELEASE ORAL at 05:36

## 2023-11-21 RX ADMIN — Medication 40 MILLIGRAM(S): at 18:01

## 2023-11-21 RX ADMIN — Medication 400 MILLIGRAM(S): at 18:24

## 2023-11-21 RX ADMIN — Medication 81 MILLIGRAM(S): at 12:22

## 2023-11-21 RX ADMIN — GABAPENTIN 300 MILLIGRAM(S): 400 CAPSULE ORAL at 18:01

## 2023-11-21 RX ADMIN — CEFEPIME 100 MILLIGRAM(S): 1 INJECTION, POWDER, FOR SOLUTION INTRAMUSCULAR; INTRAVENOUS at 18:00

## 2023-11-21 RX ADMIN — Medication 50 MILLIGRAM(S): at 19:53

## 2023-11-21 RX ADMIN — GABAPENTIN 300 MILLIGRAM(S): 400 CAPSULE ORAL at 05:35

## 2023-11-21 RX ADMIN — SERTRALINE 25 MILLIGRAM(S): 25 TABLET, FILM COATED ORAL at 12:22

## 2023-11-21 RX ADMIN — Medication 400 MILLIGRAM(S): at 05:35

## 2023-11-21 RX ADMIN — ATORVASTATIN CALCIUM 40 MILLIGRAM(S): 80 TABLET, FILM COATED ORAL at 22:10

## 2023-11-21 NOTE — H&P ADULT - PROBLEM SELECTOR PLAN 8
Patient on home amlodipine, metoprolol    Plan:  > continue with home meds Patient on home allopurinol    Plan:  > hold for now given JUAN RAMON on CKD Patient with symptoms suggestive of claudication  - blisters may be sequelae of PAD  - severe functional limitation    Plan:  > continue with home cilostazol  > follow up CHER/PVR

## 2023-11-21 NOTE — H&P ADULT - PROBLEM SELECTOR PLAN 6
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 Cabral (private nephro)    Plan:  > continue to monitor, expect improvement with continued diuresis  > avoid nephrotoxic meds Patient with known atrial tachycardia, diagnosed during admission 1/2023   - patient followed by Dr. Reddy for cardiology, and Dr. Feldman for EP  - recently had event monitor outpatient revealing significant runs of supraventricular tachycardia, though asymptomatic during these events  - restarted on BB recently by EP    Plan:  > continue with home beta blocker, can inc if needed

## 2023-11-21 NOTE — H&P ADULT - PROBLEM SELECTOR PLAN 9
DVT PPx: SCDs given pancytopenia  Diet: regular  Code: DNR/DNI  Dispo: medically active  Communication:    Discharge information:  Pharmacy:  PCP: Patient on home amlodipine, metoprolol    Plan:  > continue with home meds Patient on home allopurinol    Plan:  > hold for now given JUNA RAMON on CKD  > Recommend starting uloric on discharge

## 2023-11-21 NOTE — PROGRESS NOTE ADULT - PROBLEM SELECTOR PLAN 3
Patient presented to outpt clinic 1 week ago for feet blisters with initial concern for cellulitis  - blisters appear distal, may be representative of underlying PAD (especially given subjective symptoms of claudication)  - may have superimposed cellulitis, though is now s/p 3 days of augmentin and 4 days of bactrim outpatient  - erythema of anterior shins is chronic per patient and HHA; likely venous stasis changes    Plan:  > patient likely adequately treated outpatient given a total of 7 days of abx  > currently afebrile, no other evidence of infection, will monitor off abx  > wound care consult

## 2023-11-21 NOTE — PROGRESS NOTE ADULT - PROBLEM SELECTOR PLAN 4
Patient follows with Dr. Zita Mcmahon at [facility]  - was previously on lenolidamide but held given pancytopenia  - patient currently pancytopenic. but appears to be near baseline labs per outpt review  - receiving procrit injections with improvement in pancytopenia    Plan:  > continue with acyclovir for ppx  > fluconazole ppx on hold given prolonged QTc, restart if QTc improves  > hematology on board, follow up recs Patient follows with Dr. Zita Mcmahon at [facility]  - was previously on lenolidamide but held given pancytopenia  - patient currently pancytopenic. but appears to be near baseline labs per outpt review  - receiving procrit injections with improvement in pancytopenia    Plan:  > continue with acyclovir for ppx  > fluconazole ppx on hold given prolonged QTc, restart if QTc improves  > hematology on board, appreciate recs

## 2023-11-21 NOTE — PROGRESS NOTE ADULT - PROBLEM SELECTOR PLAN 7
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 Cabral (private nephro)    Plan:  > continue to monitor, expect improvement with continued diuresis  > avoid nephrotoxic meds

## 2023-11-21 NOTE — CONSULT NOTE ADULT - ASSESSMENT
***Note Incomplete*** This is an 83 year old M with gout, myelodysplastic syndrome, coronary artery disease, carotid artery stenosis, hypertension, hyperlipidemia, chronic kidney disease stage III, renal artery stenosis (s/p left renal artery stent), spinal stenosis, and atrial tachycardia who presents for hypoxia noted at his hematology clinic, admitted for acute hypoxic respiratory failure 2/2 to CHF exacerbation from HFpEF. Hematology consulted for management of MDS.     #MDS  #Pancytopenia  On admission, WBC count 0.96, ANC 0.24, Hgb 7.5, Plt 100, close to baseline on outpatient lab review.   - daily CBC w/ diff       ***Note Incomplete*** This is an 83 year old M with gout, myelodysplastic syndrome, coronary artery disease, carotid artery stenosis, hypertension, hyperlipidemia, chronic kidney disease stage III, renal artery stenosis (s/p left renal artery stent), spinal stenosis, and atrial tachycardia who presents for hypoxia noted at his hematology clinic, admitted for acute hypoxic respiratory failure 2/2 to CHF exacerbation from HFpEF. Hematology consulted for management of MDS.     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 On/off Revlimid due to pancytopenia, also received 2 cycles of Decitabine. On admission, WBC count 0.96, ANC 0.24, Hgb 7.5, Plt 100. Pancytopenia currently stable on review of outpatient labs.   - will review peripheral smear   - daily CBC w/ diff     #AHRF  - hypoxia workup per primary team   - symptoms unlikely related to recent chemo       ***Note Incomplete*** This is an 83 year old M with gout, myelodysplastic syndrome, coronary artery disease, carotid artery stenosis, hypertension, hyperlipidemia, chronic kidney disease stage III, renal artery stenosis (s/p left renal artery stent), spinal stenosis, and atrial tachycardia who presents for hypoxia noted at his hematology clinic, admitted for acute hypoxic respiratory failure 2/2 to CHF exacerbation from HFpEF. Hematology consulted for management of MDS.     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 On/off Revlimid due to pancytopenia, also received 2 cycles of Decitabine (10/2-6, 10/3-11/3). On admission, WBC count 0.96, ANC 0.24, Hgb 7.5, Plt 100. Pancytopenia currently stable on review of outpatient labs.   - will review peripheral smear   - daily CBC w/ diff   - no plan for inpatient chemo, f/u with Dr. Mcmahon at [facility] after discharge     #AHRF  - hypoxia workup per primary team   - symptoms unlikely related to recent chemo       ***Note Incomplete*** This is an 83 year old M with gout, myelodysplastic syndrome, coronary artery disease, carotid artery stenosis, hypertension, hyperlipidemia, chronic kidney disease stage III, renal artery stenosis (s/p left renal artery stent), spinal stenosis, and atrial tachycardia who presents for hypoxia noted at his hematology clinic, admitted for acute hypoxic respiratory failure 2/2 to CHF exacerbation from HFpEF. Hematology consulted for management of MDS.     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 On/off Revlimid due to pancytopenia, also received 2 cycles of Decitabine (10/2-6, 10/3-11/3). On admission, WBC count 0.96, ANC 0.24, Hgb 7.5, Plt 100. Pancytopenia currently stable on review of outpatient labs.   - will review peripheral smear   - daily CBC w/ diff   - maintain active type and screen, transfuse for goal hgb >7   - continue acyclovir for HSV ppx  - tranfuse plt if <50k w/ active bleeding, <20k w/ fever, <10k   - no plan for inpatient chemo, f/u with Dr. Mcmahon at [facility] [place] after discharge     #AHRF  - hypoxia workup per primary team   - symptoms unlikely related to recent chemo       ***Note Incomplete*** This is an 83 year old M with gout, myelodysplastic syndrome, coronary artery disease, carotid artery stenosis, hypertension, hyperlipidemia, chronic kidney disease stage III, renal artery stenosis (s/p left renal artery stent), spinal stenosis, and atrial tachycardia who presents for hypoxia noted at his hematology clinic, admitted for acute hypoxic respiratory failure 2/2 to CHF exacerbation from HFpEF. Hematology consulted for management of MDS.     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 On/off Revlimid due to pancytopenia, also received 2 cycles of Decitabine (10/2-6, 10/30-11/3). On admission, WBC count 0.96, ANC 0.24, Hgb 7.5, Plt 100. Pancytopenia currently stable on review of outpatient labs.   - will review peripheral smear   - daily CBC w/ diff   - maintain active type and screen, transfuse for goal hgb >7   - continue acyclovir for HSV ppx  - tranfuse plt if <50k w/ active bleeding, <20k w/ fever, <10k   - no plan for inpatient chemo, f/u with Dr. Mcmahon at [facility] after discharge     #AHRF  - hypoxia workup per primary team   - symptoms unlikely related to recent chemo       ***Note Incomplete*** This is an 83 year old M with gout, myelodysplastic syndrome, coronary artery disease, carotid artery stenosis, hypertension, hyperlipidemia, chronic kidney disease stage III, renal artery stenosis (s/p left renal artery stent), spinal stenosis, and atrial tachycardia who presents for hypoxia noted at his hematology clinic, admitted for acute hypoxic respiratory failure 2/2 to CHF exacerbation from HFpEF. Hematology consulted for management of MDS.     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 On/off Revlimid due to pancytopenia, also received 2 cycles of Decitabine (10/2-6, 10/30-11/3). On admission, WBC count 0.96, ANC 0.24, Hgb 7.5, Plt 100. Pancytopenia currently stable on review of outpatient labs.   Peripheral smear reviewed, which was notable for anisocytosis. Over 10 platelets seen per high power field. Marked leukopenia noted.   Continue supportive care for now for cytopenias:  Maintain active type and screen, transfuse for goal hgb >7. Transfuse plt if <50k w/ active bleeding, <20k w/ fever, <10k   Trend daily CBC with differential We suspect that cytopenias are likely related to infection in setting of known MDS, and will monitor for count recovery as he improves clinically.   - Continue acyclovir for HSV ppx  - no plan for inpatient chemo, f/u with Dr. Mcmahon at [facility] after discharge     #AHRF  - hypoxia workup per primary team   - symptoms unlikely related to recent chemo       ***Note Incomplete***

## 2023-11-21 NOTE — PROGRESS NOTE ADULT - PROBLEM SELECTOR PLAN 2
Troponins elevated, downtrending  - Likely due to Type 2 MI from demand ischemia in the setting of hypoxia and reduced clearance in the setting of CKD  - No chest pain Troponins elevated, downtrending  - Likely due to Type 2 MI from demand ischemia in the setting of hypoxia and reduced clearance in the setting of CKD  - No chest pain  - f/u TTE

## 2023-11-21 NOTE — CONSULT NOTE ADULT - SUBJECTIVE AND OBJECTIVE BOX
Hematology/Oncology Consult Note    HPI:  Patient is an 83 year old M with gout, myelodysplastic syndrome, coronary artery disease, carotid artery stenosis, hypertension, hyperlipidemia, chronic kidney disease stage III, renal artery stenosis (s/p left renal artery stent), spinal stenosis, and atrial tachycardia who presents for hypoxia noted at his hematology clinic. The patient reports that he went to the hematology clinic for a regular platelet transfusion, and was incidentally noted to be hypoxic without any respiratory symptoms. He reports that a couple of nights ago, he had a night where he had persistent epigastric pain that kept him up at night, and for which he took several tums. Aside from that instance, the patient has been feeling well and has no acute complaints. He reports that he continues to have lower extremity edema, which he states is chronic, in addition to the chronic bilateral LE "tightness" with ambulation. The patient also reports that nearly 10 days ago he began having blistering of his feet with associated redness and some drainage. He was seen by his PCP on 11/13 for swollen feet with blistering and found to have suspected cellulitis of the legs. The patient was initially prescribed augmentin, but continued to have symptoms so was prescribed bactrim on 11/16. The patient reports that since then, he has felt well and has no acute complaints. He denies any orthopnea, PND, worsening LE edema, chest pain or shortness of breath.     ED Course:  VS: -120, -140/60-80s, Tmax 36.9C, SpO2 88% on 5L NC with tachypnea, improved on BIPAP. Patient given 1g cefepime and 60 of IV lasix.  (21 Nov 2023 00:54)      Oncology Hx:      Allergies    No Known Allergies    Intolerances        MEDICATIONS  (STANDING):  acyclovir   Oral Tab/Cap 400 milliGRAM(s) Oral two times a day  aspirin enteric coated 81 milliGRAM(s) Oral daily  atorvastatin 40 milliGRAM(s) Oral at bedtime  cilostazol 100 milliGRAM(s) Oral two times a day  clopidogrel Tablet 75 milliGRAM(s) Oral daily  furosemide   Injectable 80 milliGRAM(s) IV Push two times a day  gabapentin 300 milliGRAM(s) Oral three times a day  metoprolol succinate ER 50 milliGRAM(s) Oral daily  pantoprazole    Tablet 40 milliGRAM(s) Oral before breakfast  sertraline 25 milliGRAM(s) Oral daily    MEDICATIONS  (PRN):  cyclobenzaprine 5 milliGRAM(s) Oral three times a day PRN Muscle Spasm      PAST MEDICAL & SURGICAL HISTORY:  HTN - Hypertension      Hypercholesterolemia      PC (prostate cancer)  s/p surgical resection 3 years ago      Gout      Aneurysm, ascending aorta      H/O prostatectomy      H/O carotid endarterectomy      H/O renal artery stenosis  s/p stent in Left RA      Status post cataract extraction, unspecified laterality          FAMILY HISTORY:      SOCIAL HISTORY: No EtOH, no tobacco    REVIEW OF SYSTEMS:    CONSTITUTIONAL: No weakness, fevers or chills  EYES/ENT: No visual changes;  No vertigo or throat pain   NECK: No pain or stiffness  RESPIRATORY: No cough, wheezing, hemoptysis; No shortness of breath  CARDIOVASCULAR: No chest pain or palpitations  GASTROINTESTINAL: No abdominal or epigastric pain. No nausea, vomiting, or hematemesis; No diarrhea or constipation. No melena or hematochezia.  GENITOURINARY: No dysuria, frequency or hematuria  NEUROLOGICAL: No numbness or weakness  SKIN: No itching, burning, rashes, or lesions   All other review of systems is negative unless indicated above.    Height (cm): 170.2 (11-20 @ 17:09)  Weight (kg): 83.9 (11-20 @ 17:09)  BMI (kg/m2): 29 (11-20 @ 17:09)  BSA (m2): 1.96 (11-20 @ 17:09)    T(F): 97.4 (11-21-23 @ 10:42), Max: 98.5 (11-20-23 @ 22:01)  HR: 89 (11-21-23 @ 10:42)  BP: 130/68 (11-21-23 @ 10:42)  RR: 17 (11-21-23 @ 10:43)  SpO2: 95% (11-21-23 @ 10:43)  Wt(kg): --    GENERAL: NAD, well-developed  HEAD:  Atraumatic, Normocephalic  EYES: EOMI, PERRLA, conjunctiva and sclera clear  NECK: Supple, No JVD  CHEST/LUNG: Clear to auscultation bilaterally; No wheeze  HEART: Regular rate and rhythm; No murmurs, rubs, or gallops  ABDOMEN: Soft, Nontender, Nondistended; Bowel sounds present  EXTREMITIES:  2+ Peripheral Pulses, No clubbing, cyanosis, or edema  NEUROLOGY: non-focal  SKIN: No rashes or lesions                          7.5    0.96  )-----------( 100      ( 21 Nov 2023 07:49 )             22.2       11-21    138  |  103  |  35<H>  ----------------------------<  123<H>  4.5   |  19<L>  |  2.84<H>    Ca    8.8      21 Nov 2023 07:25  Phos  4.3     11-21  Mg     2.4     11-21    TPro  6.4  /  Alb  3.9  /  TBili  0.4  /  DBili  x   /  AST  13  /  ALT  7<L>  /  AlkPhos  94  11-21      Magnesium: 2.4 mg/dL (11-21 @ 07:25)  Phosphorus: 4.3 mg/dL (11-21 @ 07:25)

## 2023-11-21 NOTE — H&P ADULT - NSHPREVIEWOFSYSTEMS_GEN_ALL_CORE
REVIEW OF SYSTEMS:    CONSTITUTIONAL: No weakness, fevers or chills  EYES/ENT: No visual changes;  No vertigo or throat pain   NECK: No pain or stiffness  RESPIRATORY: No cough, wheezing, hemoptysis; No shortness of breath  CARDIOVASCULAR: No chest pain or palpitations  GASTROINTESTINAL: +epigastric pain; No nausea, vomiting, or hematemesis; No diarrhea or constipation. No melena or hematochezia.  GENITOURINARY: No dysuria, frequency or hematuria  NEUROLOGICAL: No numbness or weakness  SKIN: + blistering

## 2023-11-21 NOTE — PROGRESS NOTE ADULT - SUBJECTIVE AND OBJECTIVE BOX
PROGRESS NOTE:   Authored by Dr. Michelle Bravo MD (PGY-3). Pager [facility] [phone]/ [id number]    Patient is a 83y old  Male who presents with a chief complaint of acute hypoxic respiratory failure (21 Nov 2023 10:34)      SUBJECTIVE / OVERNIGHT EVENTS:  No acute events overnight.     MEDICATIONS  (STANDING):  acyclovir   Oral Tab/Cap 400 milliGRAM(s) Oral two times a day  aspirin enteric coated 81 milliGRAM(s) Oral daily  atorvastatin 40 milliGRAM(s) Oral at bedtime  clopidogrel Tablet 75 milliGRAM(s) Oral daily  furosemide   Injectable 40 milliGRAM(s) IV Push every 12 hours  gabapentin 300 milliGRAM(s) Oral two times a day  pantoprazole    Tablet 40 milliGRAM(s) Oral before breakfast  sertraline 25 milliGRAM(s) Oral daily    MEDICATIONS  (PRN):  cyclobenzaprine 5 milliGRAM(s) Oral three times a day PRN Muscle Spasm      CAPILLARY BLOOD GLUCOSE        I&O's Summary    20 Nov 2023 07:01  -  21 Nov 2023 07:00  --------------------------------------------------------  IN: 0 mL / OUT: 1500 mL / NET: -1500 mL    21 Nov 2023 07:01  -  21 Nov 2023 14:34  --------------------------------------------------------  IN: 0 mL / OUT: 1100 mL / NET: -1100 mL        PHYSICAL EXAM:  Vital Signs Last 24 Hrs  T(C): 36.3 (21 Nov 2023 10:42), Max: 36.9 (20 Nov 2023 17:41)  T(F): 97.4 (21 Nov 2023 10:42), Max: 98.5 (20 Nov 2023 22:01)  HR: 89 (21 Nov 2023 10:42) (89 - 127)  BP: 130/68 (21 Nov 2023 10:42) (116/70 - 141/74)  BP(mean): 95 (20 Nov 2023 23:30) (81 - 97)  RR: 17 (21 Nov 2023 10:43) (16 - 23)  SpO2: 95% (21 Nov 2023 10:43) (84% - 100%)    Parameters below as of 21 Nov 2023 10:43  Patient On (Oxygen Delivery Method): nasal cannula  O2 Flow (L/min): 3      CONSTITUTIONAL: NAD  HEENT: MMM, EOMI, PERRLA  NECK: supple  RESPIRATORY: Normal respiratory effort; lungs are clear to auscultation bilaterally  CARDIOVASCULAR: Regular rate and rhythm, normal S1 and S2, no murmur/rub/gallop; No lower extremity edema  ABDOMEN: Nontender to palpation, normoactive bowel sounds, no rebound/guarding; No hepatosplenomegaly  MUSCULOSKELETAL: no clubbing or cyanosis of digits; no joint swelling or tenderness to palpation  NEURO: alert and oriented to person, place, and time. Moving all four extremities, sensation grossly intact  PSYCH: alert and oriented to person, place, and time; affect appropriate  SKIN: No rash    LABS:                        7.5    0.96  )-----------( 100      ( 21 Nov 2023 07:49 )             22.2     11-21    138  |  103  |  35<H>  ----------------------------<  123<H>  4.5   |  19<L>  |  2.84<H>    Ca    8.8      21 Nov 2023 07:25  Phos  4.3     11-21  Mg     2.4     11-21    TPro  6.4  /  Alb  3.9  /  TBili  0.4  /  DBili  x   /  AST  13  /  ALT  7<L>  /  AlkPhos  94  11-21    PT/INR - ( 20 Nov 2023 18:51 )   PT: 12.5 sec;   INR: 1.20 ratio         PTT - ( 20 Nov 2023 18:51 )  PTT:26.8 sec      Urinalysis Basic - ( 21 Nov 2023 07:25 )    Color: x / Appearance: x / SG: x / pH: x  Gluc: 123 mg/dL / Ketone: x  / Bili: x / Urobili: x   Blood: x / Protein: x / Nitrite: x   Leuk Esterase: x / RBC: x / WBC x   Sq Epi: x / Non Sq Epi: x / Bacteria: x          Tele Reviewed:    RADIOLOGY & ADDITIONAL TESTS:  Results Reviewed:   Imaging Personally Reviewed:  Electrocardiogram Personally Reviewed:     PROGRESS NOTE:   Authored by Dr. Michelle Bravo MD (PGY-3). Pager [facility] [phone]/ [facility] [id number]    Patient is a 83y old  Male who presents with a chief complaint of acute hypoxic respiratory failure (21 Nov 2023 10:34)      SUBJECTIVE / OVERNIGHT EVENTS:  No acute events overnight.     Pt denies epigastric pain, chest pain, SOB.     MEDICATIONS  (STANDING):  acyclovir   Oral Tab/Cap 400 milliGRAM(s) Oral two times a day  aspirin enteric coated 81 milliGRAM(s) Oral daily  atorvastatin 40 milliGRAM(s) Oral at bedtime  clopidogrel Tablet 75 milliGRAM(s) Oral daily  furosemide   Injectable 40 milliGRAM(s) IV Push every 12 hours  gabapentin 300 milliGRAM(s) Oral two times a day  pantoprazole    Tablet 40 milliGRAM(s) Oral before breakfast  sertraline 25 milliGRAM(s) Oral daily    MEDICATIONS  (PRN):  cyclobenzaprine 5 milliGRAM(s) Oral three times a day PRN Muscle Spasm      CAPILLARY BLOOD GLUCOSE        I&O's Summary    20 Nov 2023 07:01  -  21 Nov 2023 07:00  --------------------------------------------------------  IN: 0 mL / OUT: 1500 mL / NET: -1500 mL    21 Nov 2023 07:01  -  21 Nov 2023 14:34  --------------------------------------------------------  IN: 0 mL / OUT: 1100 mL / NET: -1100 mL        PHYSICAL EXAM:  Vital Signs Last 24 Hrs  T(C): 36.3 (21 Nov 2023 10:42), Max: 36.9 (20 Nov 2023 17:41)  T(F): 97.4 (21 Nov 2023 10:42), Max: 98.5 (20 Nov 2023 22:01)  HR: 89 (21 Nov 2023 10:42) (89 - 127)  BP: 130/68 (21 Nov 2023 10:42) (116/70 - 141/74)  BP(mean): 95 (20 Nov 2023 23:30) (81 - 97)  RR: 17 (21 Nov 2023 10:43) (16 - 23)  SpO2: 95% (21 Nov 2023 10:43) (84% - 100%)    Parameters below as of 21 Nov 2023 10:43  Patient On (Oxygen Delivery Method): nasal cannula  O2 Flow (L/min): 3      CONSTITUTIONAL: NAD  HEENT: MMM, EOMI, PERRLA  NECK: supple  RESPIRATORY: Normal respiratory effort; lungs are clear to auscultation bilaterally  CARDIOVASCULAR: Regular rate and rhythm, normal S1 and S2, no murmur/rub/gallop; No lower extremity edema  ABDOMEN: Nontender to palpation, normoactive bowel sounds, no rebound/guarding; No hepatosplenomegaly  MUSCULOSKELETAL: no clubbing or cyanosis of digits; no joint swelling or tenderness to palpation  NEURO: A&Ox3. Moving all four extremities, sensation grossly intact  PSYCH: affect appropriate  SKIN: venous stasis of bilateral LE. No rash    LABS:                        7.5    0.96  )-----------( 100      ( 21 Nov 2023 07:49 )             22.2     11-21    138  |  103  |  35<H>  ----------------------------<  123<H>  4.5   |  19<L>  |  2.84<H>    Ca    8.8      21 Nov 2023 07:25  Phos  4.3     11-21  Mg     2.4     11-21    TPro  6.4  /  Alb  3.9  /  TBili  0.4  /  DBili  x   /  AST  13  /  ALT  7<L>  /  AlkPhos  94  11-21    PT/INR - ( 20 Nov 2023 18:51 )   PT: 12.5 sec;   INR: 1.20 ratio         PTT - ( 20 Nov 2023 18:51 )  PTT:26.8 sec      Urinalysis Basic - ( 21 Nov 2023 07:25 )    Color: x / Appearance: x / SG: x / pH: x  Gluc: 123 mg/dL / Ketone: x  / Bili: x / Urobili: x   Blood: x / Protein: x / Nitrite: x   Leuk Esterase: x / RBC: x / WBC x   Sq Epi: x / Non Sq Epi: x / Bacteria: x          Tele Reviewed:    RADIOLOGY & ADDITIONAL TESTS:  Results Reviewed:   Imaging Personally Reviewed:  Electrocardiogram Personally Reviewed:

## 2023-11-21 NOTE — CONSULT NOTE ADULT - SUBJECTIVE AND OBJECTIVE BOX
HEMATOLOGY ONCOLOGY CONSULT     Patient is a 83y old  Male who presents with a chief complaint of acute hypoxic respiratory failure (21 Nov 2023 09:20)      HPI:  Patient is an 83 year old M with gout, myelodysplastic syndrome, coronary artery disease, carotid artery stenosis, hypertension, hyperlipidemia, chronic kidney disease stage III, renal artery stenosis (s/p left renal artery stent), spinal stenosis, and atrial tachycardia who presents for hypoxia noted at his hematology clinic. The patient reports that he went to the hematology clinic for a regular platelet transfusion, and was incidentally noted to be hypoxic without any respiratory symptoms. He reports that a couple of nights ago, he had a night where he had persistent epigastric pain that kept him up at night, and for which he took several tums. Aside from that instance, the patient has been feeling well and has no acute complaints. He reports that he continues to have lower extremity edema, which he states is chronic, in addition to the chronic bilateral LE "tightness" with ambulation. The patient also reports that nearly 10 days ago he began having blistering of his feet with associated redness and some drainage. He was seen by his PCP on 11/13 for swollen feet with blistering and found to have suspected cellulitis of the legs. The patient was initially prescribed augmentin, but continued to have symptoms so was prescribed bactrim on 11/16. The patient reports that since then, he has felt well and has no acute complaints. He denies any orthopnea, PND, worsening LE edema, chest pain or shortness of breath.     ED Course:  VS: -120, -140/60-80s, Tmax 36.9C, SpO2 88% on 5L NC with tachypnea, improved on BIPAP. Patient given 1g cefepime and 60 of IV lasix.  (21 Nov 2023 00:54)       ROS:  Negative except for:    PAST MEDICAL & SURGICAL HISTORY:  HTN - Hypertension      Hypercholesterolemia      PC (prostate cancer)  s/p surgical resection 3 years ago      Gout      Aneurysm, ascending aorta      H/O prostatectomy      H/O carotid endarterectomy      H/O renal artery stenosis  s/p stent in Left RA      Status post cataract extraction, unspecified laterality          SOCIAL HISTORY:    FAMILY HISTORY:      MEDICATIONS  (STANDING):  acyclovir   Oral Tab/Cap 400 milliGRAM(s) Oral two times a day  aspirin enteric coated 81 milliGRAM(s) Oral daily  atorvastatin 40 milliGRAM(s) Oral at bedtime  cilostazol 100 milliGRAM(s) Oral two times a day  clopidogrel Tablet 75 milliGRAM(s) Oral daily  furosemide   Injectable 80 milliGRAM(s) IV Push two times a day  gabapentin 300 milliGRAM(s) Oral three times a day  metoprolol succinate ER 50 milliGRAM(s) Oral daily  pantoprazole    Tablet 40 milliGRAM(s) Oral before breakfast  sertraline 25 milliGRAM(s) Oral daily    MEDICATIONS  (PRN):  cyclobenzaprine 5 milliGRAM(s) Oral three times a day PRN Muscle Spasm      Allergies    No Known Allergies    Intolerances        Vital Signs Last 24 Hrs  T(C): 36.6 (21 Nov 2023 05:06), Max: 36.9 (20 Nov 2023 17:41)  T(F): 97.9 (21 Nov 2023 05:06), Max: 98.5 (20 Nov 2023 22:01)  HR: 109 (21 Nov 2023 09:55) (99 - 127)  BP: 118/79 (21 Nov 2023 05:06) (116/70 - 141/74)  BP(mean): 95 (20 Nov 2023 23:30) (81 - 97)  RR: 20 (21 Nov 2023 08:33) (16 - 23)  SpO2: 95% (21 Nov 2023 09:55) (84% - 100%)    Parameters below as of 21 Nov 2023 08:33  Patient On (Oxygen Delivery Method): nasal cannula  O2 Flow (L/min): 4      PHYSICAL EXAM  General: adult in NAD  HEENT: clear oropharynx, anicteric sclera, pink conjunctiva  Neck: supple  CV: normal S1/S2 with no murmur rubs or gallops  Lungs: positive air movement b/l ant lungs,clear to auscultation, no wheezes, no rales  Abdomen: soft non-tender non-distended, no hepatosplenomegaly  Ext: no clubbing cyanosis or edema  Skin: no rashes and no petechiae  Neuro: alert and oriented X 4, no focal deficits      11-20-23 @ 07:01  -  11-21-23 @ 07:00  --------------------------------------------------------  IN: 0 mL / OUT: 1500 mL / NET: -1500 mL    11-21-23 @ 07:01  -  11-21-23 @ 10:34  --------------------------------------------------------  IN: 0 mL / OUT: 1100 mL / NET: -1100 mL      LABS:                          7.5    0.96  )-----------( 100      ( 21 Nov 2023 07:49 )             22.2         Mean Cell Volume : 111.0 fl  Mean Cell Hemoglobin : 37.5 pg  Mean Cell Hemoglobin Concentration : 33.8 gm/dL  Auto Neutrophil # : 0.24 K/uL  Auto Lymphocyte # : 0.64 K/uL  Auto Monocyte # : 0.02 K/uL  Auto Eosinophil # : 0.05 K/uL  Auto Basophil # : 0.02 K/uL  Auto Neutrophil % : 24.8 %  Auto Lymphocyte % : 66.4 %  Auto Monocyte % : 1.8 %  Auto Eosinophil % : 5.3 %  Auto Basophil % : 1.7 %      11-21    138  |  103  |  35<H>  ----------------------------<  123<H>  4.5   |  19<L>  |  2.84<H>    Ca    8.8      21 Nov 2023 07:25  Phos  4.3     11-21  Mg     2.4     11-21    TPro  6.4  /  Alb  3.9  /  TBili  0.4  /  DBili  x   /  AST  13  /  ALT  7<L>  /  AlkPhos  94  11-21      PT/INR - ( 20 Nov 2023 18:51 )   PT: 12.5 sec;   INR: 1.20 ratio         PTT - ( 20 Nov 2023 18:51 )  PTT:26.8 sec                BLOOD SMEAR INTERPRETATION:       RADIOLOGY & ADDITIONAL STUDIES:       HEMATOLOGY ONCOLOGY CONSULT     Patient is a 83y old  Male who presents with a chief complaint of acute hypoxic respiratory failure (21 Nov 2023 09:20)    HPI:  Patient is an 83 year old M with gout, myelodysplastic syndrome, coronary artery disease, carotid artery stenosis, hypertension, hyperlipidemia, chronic kidney disease stage III, renal artery stenosis (s/p left renal artery stent), spinal stenosis, and atrial tachycardia who presents for hypoxia noted at his hematology clinic. The patient reports that he went to the hematology clinic for a regular platelet transfusion, and was incidentally noted to be hypoxic without any respiratory symptoms. He reports that a couple of nights ago, he had a night where he had persistent epigastric pain that kept him up at night, and for which he took several tums. Aside from that instance, the patient has been feeling well and has no acute complaints. He reports that he continues to have lower extremity edema, which he states is chronic, in addition to the chronic bilateral LE "tightness" with ambulation. The patient also reports that nearly 10 days ago he began having blistering of his feet with associated redness and some drainage. He was seen by his PCP on 11/13 for swollen feet with blistering and found to have suspected cellulitis of the legs. The patient was initially prescribed augmentin, but continued to have symptoms so was prescribed bactrim on 11/16. The patient reports that since then, he has felt well and has no acute complaints. He denies any orthopnea, PND, worsening LE edema, chest pain or shortness of breath.     ED Course:  VS: -120, -140/60-80s, Tmax 36.9C, SpO2 88% on 5L NC with tachypnea, improved on BIPAP. Patient given 1g cefepime and 60 of IV lasix.  (21 Nov 2023 00:54)    Onc Hx:   10/31/22: Bone marrow biopsy: MDS with 5q deletion (65% cells) and MDS-RS with SF3B1 (39% allele frequency)  12/30/2022: started on revlimid   1/2023: revlimid held in setting to cytopenia requiring hospitalization   2/2023: started 20,000U procrit injections  6/2023: restarted revlimid  8/2023: held revlimid again due to worsening cytopenias,  increased procrit injections to 40,000u qweekly  10/2-10/6/2023: cycle 1 of decitibine  10/30-11/3/2023: cycle 2 of decitibine    ROS:  Negative except for:    PAST MEDICAL & SURGICAL HISTORY:  HTN - Hypertension      Hypercholesterolemia      PC (prostate cancer)  s/p surgical resection 3 years ago      Gout      Aneurysm, ascending aorta      H/O prostatectomy      H/O carotid endarterectomy      H/O renal artery stenosis  s/p stent in Left RA      Status post cataract extraction, unspecified laterality          SOCIAL HISTORY:    FAMILY HISTORY:      MEDICATIONS  (STANDING):  acyclovir   Oral Tab/Cap 400 milliGRAM(s) Oral two times a day  aspirin enteric coated 81 milliGRAM(s) Oral daily  atorvastatin 40 milliGRAM(s) Oral at bedtime  cilostazol 100 milliGRAM(s) Oral two times a day  clopidogrel Tablet 75 milliGRAM(s) Oral daily  furosemide   Injectable 80 milliGRAM(s) IV Push two times a day  gabapentin 300 milliGRAM(s) Oral three times a day  metoprolol succinate ER 50 milliGRAM(s) Oral daily  pantoprazole    Tablet 40 milliGRAM(s) Oral before breakfast  sertraline 25 milliGRAM(s) Oral daily    MEDICATIONS  (PRN):  cyclobenzaprine 5 milliGRAM(s) Oral three times a day PRN Muscle Spasm      Allergies    No Known Allergies    Intolerances        Vital Signs Last 24 Hrs  T(C): 36.6 (21 Nov 2023 05:06), Max: 36.9 (20 Nov 2023 17:41)  T(F): 97.9 (21 Nov 2023 05:06), Max: 98.5 (20 Nov 2023 22:01)  HR: 109 (21 Nov 2023 09:55) (99 - 127)  BP: 118/79 (21 Nov 2023 05:06) (116/70 - 141/74)  BP(mean): 95 (20 Nov 2023 23:30) (81 - 97)  RR: 20 (21 Nov 2023 08:33) (16 - 23)  SpO2: 95% (21 Nov 2023 09:55) (84% - 100%)    Parameters below as of 21 Nov 2023 08:33  Patient On (Oxygen Delivery Method): nasal cannula  O2 Flow (L/min): 4      PHYSICAL EXAM  General: adult in NAD  HEENT: clear oropharynx, anicteric sclera, pink conjunctiva  Neck: supple  CV: normal S1/S2 with no murmur rubs or gallops  Lungs: positive air movement b/l ant lungs,clear to auscultation, no wheezes, no rales  Abdomen: soft non-tender non-distended, no hepatosplenomegaly  Ext: no clubbing cyanosis or edema  Skin: no rashes and no petechiae  Neuro: alert and oriented X 4, no focal deficits      11-20-23 @ 07:01  -  11-21-23 @ 07:00  --------------------------------------------------------  IN: 0 mL / OUT: 1500 mL / NET: -1500 mL    11-21-23 @ 07:01  -  11-21-23 @ 10:34  --------------------------------------------------------  IN: 0 mL / OUT: 1100 mL / NET: -1100 mL      LABS:                          7.5    0.96  )-----------( 100      ( 21 Nov 2023 07:49 )             22.2         Mean Cell Volume : 111.0 fl  Mean Cell Hemoglobin : 37.5 pg  Mean Cell Hemoglobin Concentration : 33.8 gm/dL  Auto Neutrophil # : 0.24 K/uL  Auto Lymphocyte # : 0.64 K/uL  Auto Monocyte # : 0.02 K/uL  Auto Eosinophil # : 0.05 K/uL  Auto Basophil # : 0.02 K/uL  Auto Neutrophil % : 24.8 %  Auto Lymphocyte % : 66.4 %  Auto Monocyte % : 1.8 %  Auto Eosinophil % : 5.3 %  Auto Basophil % : 1.7 %      11-21    138  |  103  |  35<H>  ----------------------------<  123<H>  4.5   |  19<L>  |  2.84<H>    Ca    8.8      21 Nov 2023 07:25  Phos  4.3     11-21  Mg     2.4     11-21    TPro  6.4  /  Alb  3.9  /  TBili  0.4  /  DBili  x   /  AST  13  /  ALT  7<L>  /  AlkPhos  94  11-21      PT/INR - ( 20 Nov 2023 18:51 )   PT: 12.5 sec;   INR: 1.20 ratio         PTT - ( 20 Nov 2023 18:51 )  PTT:26.8 sec                BLOOD SMEAR INTERPRETATION:       RADIOLOGY & ADDITIONAL STUDIES:       HEMATOLOGY ONCOLOGY CONSULT     Patient is a 83y old  Male who presents with a chief complaint of acute hypoxic respiratory failure (21 Nov 2023 09:20)    HPI:  Patient is an 83 year old M with gout, myelodysplastic syndrome, coronary artery disease, carotid artery stenosis, hypertension, hyperlipidemia, chronic kidney disease stage III, renal artery stenosis (s/p left renal artery stent), spinal stenosis, and atrial tachycardia who presents for hypoxia noted at his hematology clinic. The patient reports that he went to the hematology clinic for a regular platelet transfusion, and was incidentally noted to be hypoxic without any respiratory symptoms. He reports that a couple of nights ago, he had a night where he had persistent epigastric pain that kept him up at night, and for which he took several tums. Aside from that instance, the patient has been feeling well and has no acute complaints. He reports that he continues to have lower extremity edema, which he states is chronic, in addition to the chronic bilateral LE "tightness" with ambulation. The patient also reports that nearly 10 days ago he began having blistering of his feet with associated redness and some drainage. He was seen by his PCP on 11/13 for swollen feet with blistering and found to have suspected cellulitis of the legs. The patient was initially prescribed augmentin, but continued to have symptoms so was prescribed bactrim on 11/16. The patient reports that since then, he has felt well and has no acute complaints. He denies any orthopnea, PND, worsening LE edema, chest pain or shortness of breath.     ED Course:  VS: -120, -140/60-80s, Tmax 36.9C, SpO2 88% on 5L NC with tachypnea, improved on BIPAP. Patient given 1g cefepime and 60 of IV lasix.  (21 Nov 2023 00:54)    Interval Hx:     Onc Hx:   Patient follows with Dr. 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% cells) and MDS-RS with SF3B1 (39% allele frequency)  12/30/2022: started on revlimid   1/2023: revlimid held in setting of cytopenia requiring hospitalization   2/2023: started 20,000U procrit injections  6/2023: restarted revlimid  8/2023: held revlimid again due to worsening cytopenias,  increased procrit injections to 40,000u qweekly  10/2-10/6/2023: cycle 1 of decitibine  10/30-11/3/2023: cycle 2 of decitibine    ROS:  Negative except for:    PAST MEDICAL & SURGICAL HISTORY:  HTN - Hypertension  Hypercholesterolemia  PC (prostate cancer) s/p prostatectomy 3 years ago  Gout  Aneurysm, ascending aorta  H/O carotid endarterectomy  H/O renal artery stenosis s/p stent in Left RA  Status post cataract extraction, unspecified laterality    SOCIAL HISTORY:    FAMILY HISTORY:    MEDICATIONS  (STANDING):  acyclovir   Oral Tab/Cap 400 milliGRAM(s) Oral two times a day  aspirin enteric coated 81 milliGRAM(s) Oral daily  atorvastatin 40 milliGRAM(s) Oral at bedtime  cilostazol 100 milliGRAM(s) Oral two times a day  clopidogrel Tablet 75 milliGRAM(s) Oral daily  furosemide   Injectable 80 milliGRAM(s) IV Push two times a day  gabapentin 300 milliGRAM(s) Oral three times a day  metoprolol succinate ER 50 milliGRAM(s) Oral daily  pantoprazole    Tablet 40 milliGRAM(s) Oral before breakfast  sertraline 25 milliGRAM(s) Oral daily    MEDICATIONS  (PRN):  cyclobenzaprine 5 milliGRAM(s) Oral three times a day PRN Muscle Spasm    Allergies  No Known Allergies    Intolerances    Vital Signs Last 24 Hrs  T(C): 36.6 (21 Nov 2023 05:06), Max: 36.9 (20 Nov 2023 17:41)  T(F): 97.9 (21 Nov 2023 05:06), Max: 98.5 (20 Nov 2023 22:01)  HR: 109 (21 Nov 2023 09:55) (99 - 127)  BP: 118/79 (21 Nov 2023 05:06) (116/70 - 141/74)  BP(mean): 95 (20 Nov 2023 23:30) (81 - 97)  RR: 20 (21 Nov 2023 08:33) (16 - 23)  SpO2: 95% (21 Nov 2023 09:55) (84% - 100%)    Parameters below as of 21 Nov 2023 08:33  Patient On (Oxygen Delivery Method): nasal cannula  O2 Flow (L/min): 4    PHYSICAL EXAM  General: adult in NAD  HEENT: clear oropharynx, anicteric sclera, pink conjunctiva  Neck: supple  CV: normal S1/S2 with no murmur rubs or gallops  Lungs: positive air movement b/l ant lungs,clear to auscultation, no wheezes, no rales  Abdomen: soft non-tender non-distended, no hepatosplenomegaly  Ext: no clubbing cyanosis or edema  Skin: no rashes and no petechiae  Neuro: alert and oriented X 4, no focal deficits      11-20-23 @ 07:01  -  11-21-23 @ 07:00  --------------------------------------------------------  IN: 0 mL / OUT: 1500 mL / NET: -1500 mL    11-21-23 @ 07:01  -  11-21-23 @ 10:34  --------------------------------------------------------  IN: 0 mL / OUT: 1100 mL / NET: -1100 mL      LABS:                      7.5    0.96  )-----------( 100      ( 21 Nov 2023 07:49 )             22.2   Mean Cell Volume : 111.0 fl  Mean Cell Hemoglobin : 37.5 pg  Mean Cell Hemoglobin Concentration : 33.8 gm/dL  Auto Neutrophil # : 0.24 K/uL  Auto Lymphocyte # : 0.64 K/uL  Auto Monocyte # : 0.02 K/uL  Auto Eosinophil # : 0.05 K/uL  Auto Basophil # : 0.02 K/uL  Auto Neutrophil % : 24.8 %  Auto Lymphocyte % : 66.4 %  Auto Monocyte % : 1.8 %  Auto Eosinophil % : 5.3 %  Auto Basophil % : 1.7 %    11-21    138  |  103  |  35<H>  ----------------------------<  123<H>  4.5   |  19<L>  |  2.84<H>    Ca    8.8      21 Nov 2023 07:25  Phos  4.3     11-21  Mg     2.4     11-21    TPro  6.4  /  Alb  3.9  /  TBili  0.4  /  DBili  x   /  AST  13  /  ALT  7<L>  /  AlkPhos  94  11-21  PT/INR - ( 20 Nov 2023 18:51 )   PT: 12.5 sec;   INR: 1.20 ratio    PTT - ( 20 Nov 2023 18:51 )  PTT:26.8 sec    BLOOD SMEAR INTERPRETATION:     RADIOLOGY & ADDITIONAL STUDIES:       HEMATOLOGY ONCOLOGY CONSULT     Patient is a 83y old  Male who presents with a chief complaint of acute hypoxic respiratory failure (21 Nov 2023 09:20)    HPI:  Patient is an 83 year old M with gout, myelodysplastic syndrome, coronary artery disease, carotid artery stenosis, hypertension, hyperlipidemia, chronic kidney disease stage III, renal artery stenosis (s/p left renal artery stent), spinal stenosis, and atrial tachycardia who presents for hypoxia noted at his hematology clinic. The patient reports that he went to the hematology clinic for a regular platelet transfusion, and was incidentally noted to be hypoxic without any respiratory symptoms. He reports that a couple of nights ago, he had a night where he had persistent epigastric pain that kept him up at night, and for which he took several tums. Aside from that instance, the patient has been feeling well and has no acute complaints. He reports that he continues to have lower extremity edema, which he states is chronic, in addition to the chronic bilateral LE "tightness" with ambulation. The patient also reports that nearly 10 days ago he began having blistering of his feet with associated redness and some drainage. He was seen by his PCP on 11/13 for swollen feet with blistering and found to have suspected cellulitis of the legs. The patient was initially prescribed augmentin, but continued to have symptoms so was prescribed bactrim on 11/16. The patient reports that since then, he has felt well and has no acute complaints. He denies any orthopnea, PND, worsening LE edema, chest pain or shortness of breath.     ED Course:  VS: -120, -140/60-80s, Tmax 36.9C, SpO2 88% on 5L NC with tachypnea, improved on BIPAP. Patient given 1g cefepime and 60 of IV lasix.  (21 Nov 2023 00:54)    Interval Hx:     Onc Hx:   Patient follows with Dr. Zita Mcmahon at 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% cells) and MDS-RS with SF3B1 (39% allele frequency)  12/30/2022: started on Revlimid   1/2023: Revlimid held in setting of cytopenia requiring hospitalization   2/2023: started 20,000U procrit injections  6/2023: restarted Revlimid  8/2023: held Revlimid again due to worsening cytopenias, increased procrit injections to 40,000U  10/2-10/6/2023: cycle 1 of Decitabine  10/30-11/3/2023: cycle 2 of Decitabine    ROS:  Negative except for:    PAST MEDICAL & SURGICAL HISTORY:  HTN - Hypertension  Hypercholesterolemia  PC (prostate cancer) s/p prostatectomy 3 years ago  Gout  Aneurysm, ascending aorta  H/O carotid endarterectomy  H/O renal artery stenosis s/p stent in Left RA  Status post cataract extraction, unspecified laterality    SOCIAL HISTORY:    FAMILY HISTORY:    MEDICATIONS  (STANDING):  acyclovir   Oral Tab/Cap 400 milliGRAM(s) Oral two times a day  aspirin enteric coated 81 milliGRAM(s) Oral daily  atorvastatin 40 milliGRAM(s) Oral at bedtime  cilostazol 100 milliGRAM(s) Oral two times a day  clopidogrel Tablet 75 milliGRAM(s) Oral daily  furosemide   Injectable 80 milliGRAM(s) IV Push two times a day  gabapentin 300 milliGRAM(s) Oral three times a day  metoprolol succinate ER 50 milliGRAM(s) Oral daily  pantoprazole    Tablet 40 milliGRAM(s) Oral before breakfast  sertraline 25 milliGRAM(s) Oral daily    MEDICATIONS  (PRN):  cyclobenzaprine 5 milliGRAM(s) Oral three times a day PRN Muscle Spasm    Allergies  No Known Allergies    Intolerances    Vital Signs Last 24 Hrs  T(C): 36.6 (21 Nov 2023 05:06), Max: 36.9 (20 Nov 2023 17:41)  T(F): 97.9 (21 Nov 2023 05:06), Max: 98.5 (20 Nov 2023 22:01)  HR: 109 (21 Nov 2023 09:55) (99 - 127)  BP: 118/79 (21 Nov 2023 05:06) (116/70 - 141/74)  BP(mean): 95 (20 Nov 2023 23:30) (81 - 97)  RR: 20 (21 Nov 2023 08:33) (16 - 23)  SpO2: 95% (21 Nov 2023 09:55) (84% - 100%)    Parameters below as of 21 Nov 2023 08:33  Patient On (Oxygen Delivery Method): nasal cannula  O2 Flow (L/min): 4    PHYSICAL EXAM  General: adult in NAD  HEENT: clear oropharynx, anicteric sclera, pink conjunctiva  Neck: supple  CV: normal S1/S2 with no murmur rubs or gallops  Lungs: positive air movement b/l ant lungs,clear to auscultation, no wheezes, no rales  Abdomen: soft non-tender non-distended, no hepatosplenomegaly  Ext: no clubbing cyanosis or edema  Skin: no rashes and no petechiae  Neuro: alert and oriented X 4, no focal deficits      11-20-23 @ 07:01  -  11-21-23 @ 07:00  --------------------------------------------------------  IN: 0 mL / OUT: 1500 mL / NET: -1500 mL    11-21-23 @ 07:01  -  11-21-23 @ 10:34  --------------------------------------------------------  IN: 0 mL / OUT: 1100 mL / NET: -1100 mL      LABS:                      7.5    0.96  )-----------( 100      ( 21 Nov 2023 07:49 )             22.2   Mean Cell Volume : 111.0 fl  Mean Cell Hemoglobin : 37.5 pg  Mean Cell Hemoglobin Concentration : 33.8 gm/dL  Auto Neutrophil # : 0.24 K/uL  Auto Lymphocyte # : 0.64 K/uL  Auto Monocyte # : 0.02 K/uL  Auto Eosinophil # : 0.05 K/uL  Auto Basophil # : 0.02 K/uL  Auto Neutrophil % : 24.8 %  Auto Lymphocyte % : 66.4 %  Auto Monocyte % : 1.8 %  Auto Eosinophil % : 5.3 %  Auto Basophil % : 1.7 %    11-21    138  |  103  |  35<H>  ----------------------------<  123<H>  4.5   |  19<L>  |  2.84<H>    Ca    8.8      21 Nov 2023 07:25  Phos  4.3     11-21  Mg     2.4     11-21    TPro  6.4  /  Alb  3.9  /  TBili  0.4  /  DBili  x   /  AST  13  /  ALT  7<L>  /  AlkPhos  94  11-21  PT/INR - ( 20 Nov 2023 18:51 )   PT: 12.5 sec;   INR: 1.20 ratio    PTT - ( 20 Nov 2023 18:51 )  PTT:26.8 sec    BLOOD SMEAR INTERPRETATION:     RADIOLOGY & ADDITIONAL STUDIES:       HEMATOLOGY ONCOLOGY CONSULT     Patient is a 83y old  Male who presents with a chief complaint of acute hypoxic respiratory failure (21 Nov 2023 09:20)    HPI:  Patient is an 83 year old M with gout, myelodysplastic syndrome, coronary artery disease, carotid artery stenosis, hypertension, hyperlipidemia, chronic kidney disease stage III, renal artery stenosis (s/p left renal artery stent), spinal stenosis, and atrial tachycardia who presents for hypoxia noted at his hematology clinic. The patient reports that he went to the hematology clinic for a regular platelet transfusion, and was incidentally noted to be hypoxic without any respiratory symptoms. He reports that a couple of nights ago, he had a night where he had persistent epigastric pain that kept him up at night, and for which he took several tums. Aside from that instance, the patient has been feeling well and has no acute complaints. He reports that he continues to have lower extremity edema, which he states is chronic, in addition to the chronic bilateral LE "tightness" with ambulation. The patient also reports that nearly 10 days ago he began having blistering of his feet with associated redness and some drainage. He was seen by his PCP on 11/13 for swollen feet with blistering and found to have suspected cellulitis of the legs. The patient was initially prescribed augmentin, but continued to have symptoms so was prescribed bactrim on 11/16. The patient reports that since then, he has felt well and has no acute complaints. He denies any orthopnea, PND, worsening LE edema, chest pain or shortness of breath.     ED Course:  VS: -120, -140/60-80s, Tmax 36.9C, SpO2 88% on 5L NC with tachypnea, improved on BIPAP. Patient given 1g cefepime and 60 of IV lasix.  (21 Nov 2023 00:54)    Interval Hx:   Patient seen at bedside this morning. Reported occasional epigastric pain for the past few days, relieved w/ tums and omeprazole. Denied any fever, chills, chest pain, sob, n/v.     Onc Hx:   Patient follows with Dr. 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)  12/30/2022: started on Revlimid   1/2023: Revlimid held in setting of cytopenia requiring hospitalization   2/2023: started 20,000U procrit injections  6/2023: restarted Revlimid  8/2023: held Revlimid again due to worsening cytopenias, increased procrit injections to 40,000U  10/2-10/6/2023: cycle 1 of Decitabine  10/30-11/3/2023: cycle 2 of Decitabine    ROS:  Negative     PAST MEDICAL & SURGICAL HISTORY:  HTN - Hypertension  Hypercholesterolemia  PC (prostate cancer) s/p prostatectomy 3 years ago  Gout  Aneurysm, ascending aorta  H/O carotid endarterectomy  H/O renal artery stenosis s/p stent in Left RA  Status post cataract extraction, unspecified laterality    SOCIAL HISTORY:  Lives at home w/ 24/7 HHA. Ambulates w/ cane at baseline.     FAMILY HISTORY:  No pertinent family history.     MEDICATIONS  (STANDING):  acyclovir   Oral Tab/Cap 400 milliGRAM(s) Oral two times a day  aspirin enteric coated 81 milliGRAM(s) Oral daily  atorvastatin 40 milliGRAM(s) Oral at bedtime  cilostazol 100 milliGRAM(s) Oral two times a day  clopidogrel Tablet 75 milliGRAM(s) Oral daily  furosemide   Injectable 80 milliGRAM(s) IV Push two times a day  gabapentin 300 milliGRAM(s) Oral three times a day  metoprolol succinate ER 50 milliGRAM(s) Oral daily  pantoprazole    Tablet 40 milliGRAM(s) Oral before breakfast  sertraline 25 milliGRAM(s) Oral daily    MEDICATIONS  (PRN):  cyclobenzaprine 5 milliGRAM(s) Oral three times a day PRN Muscle Spasm    Allergies  No Known Allergies    Intolerances    Vital Signs Last 24 Hrs  T(C): 36.6 (21 Nov 2023 05:06), Max: 36.9 (20 Nov 2023 17:41)  T(F): 97.9 (21 Nov 2023 05:06), Max: 98.5 (20 Nov 2023 22:01)  HR: 109 (21 Nov 2023 09:55) (99 - 127)  BP: 118/79 (21 Nov 2023 05:06) (116/70 - 141/74)  BP(mean): 95 (20 Nov 2023 23:30) (81 - 97)  RR: 20 (21 Nov 2023 08:33) (16 - 23)  SpO2: 95% (21 Nov 2023 09:55) (84% - 100%)    Parameters below as of 21 Nov 2023 08:33  Patient On (Oxygen Delivery Method): nasal cannula  O2 Flow (L/min): 4    PHYSICAL EXAM  General: adult in NAD  HEENT: clear oropharynx, anicteric sclera, pink conjunctiva  Neck: supple, trachea midline   CV: sinus tachycardia, normal S1/S2, systolic murmur  Lungs: on 4L NC, positive air movement b/l ant lungs, clear to auscultation, no wheezes, no rales  Abdomen: obese, soft, nondistended, nontender   Ext: able to move all extremities, erythematous venous stasis on b/l shins, no clubbing cyanosis or edema, bandage over R big toe   Skin: no rashes and no petechiae  Neuro: alert and oriented X 4, no focal deficits      11-20-23 @ 07:01  -  11-21-23 @ 07:00  --------------------------------------------------------  IN: 0 mL / OUT: 1500 mL / NET: -1500 mL    11-21-23 @ 07:01  -  11-21-23 @ 10:34  --------------------------------------------------------  IN: 0 mL / OUT: 1100 mL / NET: -1100 mL      LABS:                      7.5    0.96  )-----------( 100      ( 21 Nov 2023 07:49 )             22.2   Mean Cell Volume : 111.0 fl  Mean Cell Hemoglobin : 37.5 pg  Mean Cell Hemoglobin Concentration : 33.8 gm/dL  Auto Neutrophil # : 0.24 K/uL  Auto Lymphocyte # : 0.64 K/uL  Auto Monocyte # : 0.02 K/uL  Auto Eosinophil # : 0.05 K/uL  Auto Basophil # : 0.02 K/uL  Auto Neutrophil % : 24.8 %  Auto Lymphocyte % : 66.4 %  Auto Monocyte % : 1.8 %  Auto Eosinophil % : 5.3 %  Auto Basophil % : 1.7 %    11-21    138  |  103  |  35<H>  ----------------------------<  123<H>  4.5   |  19<L>  |  2.84<H>    Ca    8.8      21 Nov 2023 07:25  Phos  4.3     11-21  Mg     2.4     11-21    TPro  6.4  /  Alb  3.9  /  TBili  0.4  /  DBili  x   /  AST  13  /  ALT  7<L>  /  AlkPhos  94  11-21  PT/INR - ( 20 Nov 2023 18:51 )   PT: 12.5 sec;   INR: 1.20 ratio    PTT - ( 20 Nov 2023 18:51 )  PTT:26.8 sec    BLOOD SMEAR INTERPRETATION:     RADIOLOGY & ADDITIONAL STUDIES:  < from: CT Angio Abdomen and Pelvis w/ IV Cont (11.20.23 @ 21:02) >  IMPRESSION:  No thoracic or abdominal aortic aneurysm or dissection.    Small bilateral pleural effusions with mild interlobular septal   thickening which may reflect mild pulmonary interstitial edema.   Compressive bilateral lower lobe atelectasis    < end of copied text >

## 2023-11-21 NOTE — CONSULT NOTE ADULT - SUBJECTIVE AND OBJECTIVE BOX
HPI: Mr. Moya is an 83 year-old man with history of multiple medical issues including hypertension, renal artery stenosis s/p left renal artery stenting, stage 4 chronic kidney disease, gout, and myelodysplastic syndrome. He was sent last night to the [facility] ER after being noted at Hematology clinic to be hypoxic. He denied associated shortness of breath/other respiratory symptoms. He admitted to epigastric pain for which he took TUMS. He also shared that he had blistering of his feet with associated redness/drainage, for which he was prescribed Augmentin.     In the ER, his SpO2 was 885 on 5LNC; improved on BIPAP. He received IV Cefepime and 60mg of IV Lasix in the ER.    I last saw Mr. Moya at my office last week (11/15/23). I found out that he was taking Diclofenac cream for back pain; I advised that he discontinue it, especially given that it was not alleviating his pain. I advised him at that time to sell his car, as his strength was not improving to the point where it would be reasonable for him to drive a car anytime soon.      PAST MEDICAL & SURGICAL HISTORY:  HTN  HLD  Coronary artery disease  Renal artery stenosis - left renal artery stent  CKD4  Carotid stenosis - CEA  Gout  Spinal stenosis  Myelodysplastic syndrome  Prostate CA - surgical resection   Ascending aortic aneurysm  Cataract extraction    Allergies  No Known Allergies    SOCIAL HISTORY:  Denies ETOh,Smoking,     FAMILY HISTORY:  No CKD    REVIEW OF SYSTEMS:  CONSTITUTIONAL: No weakness, fevers or chills  EYES/ENT: No visual changes;  No vertigo or throat pain   NECK: No pain or stiffness  RESPIRATORY: No cough, wheezing, hemoptysis; No shortness of breath  CARDIOVASCULAR: No chest pain or palpitations  GASTROINTESTINAL: (+)abdominal pain  GENITOURINARY: No dysuria, frequency or hematuria  NEUROLOGICAL: (+)back pain; (+)LE weakness  SKIN: (+)blistering of feet  All other review of systems is negative unless indicated above.    VITAL:  T(C): , Max: 36.9 (11-20-23 @ 17:41)  T(F): , Max: 98.5 (11-20-23 @ 22:01)  HR: 121 (11-21-23 @ 05:06)  BP: 118/79 (11-21-23 @ 05:06)  BP(mean): 95 (11-20-23 @ 23:30)  RR: 20 (11-21-23 @ 08:33)  SpO2: 96% (11-21-23 @ 08:33)    PHYSICAL EXAM:  Constitutional: NAD, Alert  HEENT: NCAT, MMM  Neck: Supple, No JVD  Respiratory: CTA-b/l  Cardiovascular: tachy s1s2  Gastrointestinal: BS+, soft, NT/ND  Extremities: No peripheral edema b/l  Neurological: no focal deficits; strength grossly intact  Back: no CVAT b/l  Skin: No rashes, no nevi    LABS:                        7.5    0.96  )-----------( 100      ( 21 Nov 2023 07:49 )             22.2     Na(138)/K(4.5)/Cl(103)/HCO3(19)/BUN(35)/Cr(2.84)Glu(123)/Ca(8.8)/Mg(2.4)/PO4(4.3)    11-21 @ 07:25  Na(137)/K(4.6)/Cl(105)/HCO3(21)/BUN(35)/Cr(2.64)Glu(131)/Ca(9.0)/Mg(--)/PO4(--)    11-20 @ 20:24  Na(137)/K(4.8)/Cl(104)/HCO3(20)/BUN(36)/Cr(2.62)Glu(114)/Ca(9.0)/Mg(--)/PO4(--)    11-20 @ 18:51    (11/13/23) - BUN 24, Cr 2.19, K 5.5, HCO3 21, Ca 8.5, Alb 4.3, WBC 2.35, Hb 9.2, Plt 58  (10/2/23) - BUN/creatinine: 24/1.84      IMAGING:  < from: CT Angio Abdomen and Pelvis w/ IV Cont (11.20.23 @ 21:02) >  No thoracic or abdominal aortic aneurysm or dissection.  Small bilateral pleural effusions with mild interlobular septal   thickening which may reflect mild pulmonary interstitial edema.   Compressive bilateral lower lobe atelectasis  KIDNEYS/URETERS: No hydronephrosis. Bilateral renal cortical scarring.   Right renal atrophy. Punctate nonobstructing right renal stone. Left   renal cyst.        ASSESSMENT:  (1)CKD - nonproteinuric CKD4 - largely due to renal artery stenosis; nephrolithiasis, hypertensive nephrosclerosis, and renal atheroembolic disease also may be playing roles here     (2)JUAN RAMON - hemodynamic, from decompensated CHF? Does not appear to be from [facility] - the CT was performed after the renal insult had already taken place.    (3)Metabolic acidosis - mild - renally mediated    (4)Pancytopenia - MDS    (5)Hypoxia - cardiogenic pulmonary edema? CT not overly impressive. On high-dose IV Lasix for now        RECOMMEND:  (1)Can continue IV Lasix as ordered for now  (2)Strict I/Os  (3)BMP+Mg+PO4 daily  (4)Check CPK and uric acid with a.m. labwork  (5)Check urine: UA, lytes, urea nitrogen, creatinine  (6)Dose new meds for GFR 20-25ml/min (present dosing is acceptable)    Thank you for involving [facility] in this patient's care.    With warm regards,    Edis Cabral MD   [facility]  Office: [phone]  Cell: [phone]             HPI: Mr. Moya is an 83 year-old man with history of multiple medical issues including hypertension, renal artery stenosis s/p left renal artery stenting, stage 4 chronic kidney disease, gout, and myelodysplastic syndrome. He was sent last night to the [facility] ER after being noted at Hematology clinic to be hypoxic. He denied associated shortness of breath/other respiratory symptoms. He admitted to epigastric pain for which he took TUMS. He also shared that he had blistering of his feet with associated redness/drainage, for which he was prescribed Augmentin.     In the ER, his SpO2 was 885 on 5LNC; improved on BIPAP. He received IV Cefepime and 60mg of IV Lasix in the ER.    I last saw Mr. Moya at my office last week (11/15/23). I found out that he was taking Diclofenac cream for back pain; I advised that he discontinue it, especially given that it was not alleviating his pain. I advised him at that time to sell his car, as his strength was not improving to the point where it would be reasonable for him to drive a car anytime soon.    Mr. Moya attests to good urine output of late, and he denies any recent urinary changes. He has not used NSAIDs since I saw him at the office last week.    PAST MEDICAL & SURGICAL HISTORY:  HTN  HLD  Coronary artery disease  Renal artery stenosis - left renal artery stent  CKD4  Carotid stenosis - CEA  Gout  Spinal stenosis  Myelodysplastic syndrome  Prostate CA - surgical resection   Ascending aortic aneurysm  Cataract extraction    Allergies  No Known Allergies    SOCIAL HISTORY:  Denies ETOh,Smoking,     FAMILY HISTORY:  No CKD    REVIEW OF SYSTEMS:  CONSTITUTIONAL: No weakness, fevers or chills  EYES/ENT: No visual changes;  No vertigo or throat pain   NECK: No pain or stiffness  RESPIRATORY: No cough, wheezing, hemoptysis; No shortness of breath  CARDIOVASCULAR: No chest pain or palpitations  GASTROINTESTINAL: (+)abdominal pain  GENITOURINARY: No dysuria, frequency or hematuria  NEUROLOGICAL: (+)back pain; (+)LE weakness  SKIN: (+)blistering of feet  All other review of systems is negative unless indicated above.    VITAL:  T(C): , Max: 36.9 (11-20-23 @ 17:41)  T(F): , Max: 98.5 (11-20-23 @ 22:01)  HR: 121 (11-21-23 @ 05:06)  BP: 118/79 (11-21-23 @ 05:06)  BP(mean): 95 (11-20-23 @ 23:30)  RR: 20 (11-21-23 @ 08:33)  SpO2: 96% (11-21-23 @ 08:33)    PHYSICAL EXAM:  Constitutional: NAD at rest on NCO2  HEENT: NCAT, DMM  Neck: Supple, No JVD  Respiratory: (+)bibasilar rales  Cardiovascular: tachy s1s2, (+)1/6 LESA  Gastrointestinal: BS+, soft, NT/ND  Extremities: No peripheral edema b/l  Neurological: reduced generalized strength  Back: no CVAT b/l  Skin: (+)erythema b/l shins    LABS:                        7.5    0.96  )-----------( 100      ( 21 Nov 2023 07:49 )             22.2     Na(138)/K(4.5)/Cl(103)/HCO3(19)/BUN(35)/Cr(2.84)Glu(123)/Ca(8.8)/Mg(2.4)/PO4(4.3)    11-21 @ 07:25  Na(137)/K(4.6)/Cl(105)/HCO3(21)/BUN(35)/Cr(2.64)Glu(131)/Ca(9.0)/Mg(--)/PO4(--)    11-20 @ 20:24  Na(137)/K(4.8)/Cl(104)/HCO3(20)/BUN(36)/Cr(2.62)Glu(114)/Ca(9.0)/Mg(--)/PO4(--)    11-20 @ 18:51    (11/13/23) - BUN 24, Cr 2.19, K 5.5, HCO3 21, Ca 8.5, Alb 4.3, WBC 2.35, Hb 9.2, Plt 58  (10/2/23) - BUN/creatinine: 24/1.84      IMAGING:  < from: CT Angio Abdomen and Pelvis w/ IV Cont (11.20.23 @ 21:02) >  No thoracic or abdominal aortic aneurysm or dissection.  Small bilateral pleural effusions with mild interlobular septal   thickening which may reflect mild pulmonary interstitial edema.   Compressive bilateral lower lobe atelectasis  KIDNEYS/URETERS: No hydronephrosis. Bilateral renal cortical scarring.   Right renal atrophy. Punctate nonobstructing right renal stone. Left   renal cyst.        ASSESSMENT:  (1)CKD - nonproteinuric CKD4 - largely due to renal artery stenosis; nephrolithiasis, hypertensive nephrosclerosis, and renal atheroembolic disease also may be playing roles here     (2)JUAN RAMON - hemodynamic, from decompensated CHF? Does not appear to be from [facility] - the CT was performed after the renal insult had already taken place.    (3)Metabolic acidosis - mild - renally mediated    (4)Pancytopenia - MDS    (5)Hypoxia - cardiogenic pulmonary edema? CT not overly impressive. On high-dose IV Lasix for now        RECOMMEND:  (1)Can continue IV Lasix as ordered for now  (2)Strict I/Os  (3)BMP+Mg+PO4 daily  (4)Check CPK and uric acid with a.m. labwork  (5)Check urine: UA, lytes, urea nitrogen, creatinine  (6)Dose new meds for GFR 20-25ml/min (present dosing is acceptable)    Thank you for involving [facility] in this patient's care.    With warm regards,    Edis Cabral MD   [facility]  Office: [phone]  Cell: [phone]             HPI: Mr. Moya is an 83 year-old man with history of multiple medical issues including hypertension, renal artery stenosis s/p left renal artery stenting, stage 4 chronic kidney disease, gout, and myelodysplastic syndrome. He was sent last night to the [facility] ER after being noted at Hematology clinic to be hypoxic. He denied associated shortness of breath/other respiratory symptoms. He admitted to epigastric pain for which he took TUMS. He also shared that he had blistering of his feet with associated redness/drainage, for which he was prescribed Augmentin.     In the ER, his SpO2 was 88% on 5LNC; improved on BIPAP. He received IV Cefepime and 60mg of IV Lasix in the ER.    I last saw Mr. Moya at my office last week (11/15/23). I found out that he was taking Diclofenac cream for back pain; I advised that he discontinue it, especially given that it was not alleviating his pain. I advised him at that time to sell his car, as his strength was not improving to the point where it would be reasonable for him to drive a car anytime soon.    Mr. Moya attests to good urine output of late, and he denies any recent urinary changes. He has not used NSAIDs since I saw him at the office last week. He shares that his heart murmur is chronic.    PAST MEDICAL & SURGICAL HISTORY:  HTN  HLD  Coronary artery disease  Renal artery stenosis - left renal artery stent  CKD4  Carotid stenosis - CEA  Gout  Spinal stenosis  Myelodysplastic syndrome  Prostate CA - surgical resection   Ascending aortic aneurysm  Cataract extraction    Allergies  No Known Allergies    SOCIAL HISTORY:  Denies ETOh,Smoking,     FAMILY HISTORY:  No CKD    REVIEW OF SYSTEMS:  CONSTITUTIONAL: No weakness, fevers or chills  EYES/ENT: No visual changes;  No vertigo or throat pain   NECK: No pain or stiffness  RESPIRATORY: No cough, wheezing, hemoptysis; No shortness of breath  CARDIOVASCULAR: No chest pain or palpitations  GASTROINTESTINAL: (+)abdominal pain  GENITOURINARY: No dysuria, frequency or hematuria  NEUROLOGICAL: (+)back pain; (+)LE weakness  SKIN: (+)blistering of feet  All other review of systems is negative unless indicated above.    VITAL:  T(C): , Max: 36.9 (11-20-23 @ 17:41)  T(F): , Max: 98.5 (11-20-23 @ 22:01)  HR: 121 (11-21-23 @ 05:06)  BP: 118/79 (11-21-23 @ 05:06)  BP(mean): 95 (11-20-23 @ 23:30)  RR: 20 (11-21-23 @ 08:33)  SpO2: 96% (11-21-23 @ 08:33)    PHYSICAL EXAM:  Constitutional: NAD at rest on NCO2  HEENT: NCAT, DMM  Neck: Supple, No JVD  Respiratory: (+)bibasilar rales  Cardiovascular: tachy s1s2, (+)1/6 LESA  Gastrointestinal: BS+, soft, NT/ND  Extremities: No peripheral edema b/l  Neurological: reduced generalized strength  Back: no CVAT b/l  Skin: (+)erythema b/l shins    LABS:                        7.5    0.96  )-----------( 100      ( 21 Nov 2023 07:49 )             22.2     Na(138)/K(4.5)/Cl(103)/HCO3(19)/BUN(35)/Cr(2.84)Glu(123)/Ca(8.8)/Mg(2.4)/PO4(4.3)    11-21 @ 07:25  Na(137)/K(4.6)/Cl(105)/HCO3(21)/BUN(35)/Cr(2.64)Glu(131)/Ca(9.0)/Mg(--)/PO4(--)    11-20 @ 20:24  Na(137)/K(4.8)/Cl(104)/HCO3(20)/BUN(36)/Cr(2.62)Glu(114)/Ca(9.0)/Mg(--)/PO4(--)    11-20 @ 18:51    (11/13/23) - BUN 24, Cr 2.19, K 5.5, HCO3 21, Ca 8.5, Alb 4.3, WBC 2.35, Hb 9.2, Plt 58  (10/2/23) - BUN/creatinine: 24/1.84      IMAGING:  < from: CT Angio Abdomen and Pelvis w/ IV Cont (11.20.23 @ 21:02) >  No thoracic or abdominal aortic aneurysm or dissection.  Small bilateral pleural effusions with mild interlobular septal   thickening which may reflect mild pulmonary interstitial edema.   Compressive bilateral lower lobe atelectasis  KIDNEYS/URETERS: No hydronephrosis. Bilateral renal cortical scarring.   Right renal atrophy. Punctate nonobstructing right renal stone. Left   renal cyst.        ASSESSMENT:  (1)CKD - nonproteinuric CKD4 - largely due to renal artery stenosis; nephrolithiasis, hypertensive nephrosclerosis, and renal atheroembolic disease also may be playing roles here     (2)JUAN RAMON - hemodynamic, from decompensated CHF? Does not appear to be from [facility] - the CT was performed after the renal insult had already taken place.    (3)Metabolic acidosis - mild - renally mediated    (4)Pancytopenia - MDS    (5)Hypoxia - cardiogenic pulmonary edema? CT not overly impressive. On high-dose IV Lasix for now        RECOMMEND:  (1)Can continue IV Lasix as ordered for now  (2)Strict I/Os  (3)BMP+Mg+PO4 daily  (4)Check CPK and uric acid with a.m. labwork  (5)Check urine: UA, lytes, urea nitrogen, creatinine  (6)Dose new meds for GFR 20-25ml/min (present dosing is acceptable)    Thank you for involving [facility] in this patient's care.    With warm regards,    Edis Cabral MD   [facility]  Office: [phone]  Cell: [phone]

## 2023-11-21 NOTE — H&P ADULT - HISTORY OF PRESENT ILLNESS
Patient is an 83 year old M with gout, myelodysplastic syndrome, coronary artery disease, carotid artery stenosis, hypertension, hyperlipidemia, chronic kidney disease stage III, renal artery stenosis (s/p left renal artery stent), spinal stenosis, and chronic atrial fibrillation who presents for hypoxia noted at his hematology clinic.      The patient was seen by his PCP on 11/13 for swollen feet with blistering and found to have suspected cellulitis of the legs. The patient was initially prescribed augmentin, but continued to have symptoms so was prescribed bactrim on 11/16.     ED Course:  VS: -120, -140/60-80s, Tmax 36.9C, SpO2 88% on 5L NC with tachypnea, improved on BIPAP. Patient 1g cefepime and 60 of IV lasix.  Patient is an 83 year old M with gout, myelodysplastic syndrome, coronary artery disease, carotid artery stenosis, hypertension, hyperlipidemia, chronic kidney disease stage III, renal artery stenosis (s/p left renal artery stent), spinal stenosis, and atrial tachycardia who presents for hypoxia noted at his hematology clinic. The patient reports that he went to the hematology clinic . He reports that a couple of nights ago, he had a night where he had persistent epigastric pain that kept him up at night, and for which he took several tums. Aside from that instance, the patient has been feeling well and has no acute complaints. He reports that he continues to have lower extremity edema, which he states is chronic, in addition to the chronic bilateral LE "tightness" with ambulation. The patient also reports that nearly 10 days ago he began having blistering of his feet with associated redness and some drainage. He was seen by his PCP on 11/13 for swollen feet with blistering and found to have suspected cellulitis of the legs. The patient was initially prescribed augmentin, but continued to have symptoms so was prescribed bactrim on 11/16. The patient reports that since then, he has felt well and has no acute complaints. He denies any orthopnea, PND, worsening LE edema, chest pain or shortness of breath.     ED Course:  VS: -120, -140/60-80s, Tmax 36.9C, SpO2 88% on 5L NC with tachypnea, improved on BIPAP. Patient given 1g cefepime and 60 of IV lasix.  Patient is an 83 year old M with gout, myelodysplastic syndrome, coronary artery disease, carotid artery stenosis, hypertension, hyperlipidemia, chronic kidney disease stage III, renal artery stenosis (s/p left renal artery stent), spinal stenosis, and atrial tachycardia who presents for hypoxia noted at his hematology clinic. The patient reports that he went to the hematology clinic for a regular platelet transfusion, and was incidentally noted to be hypoxic without any respiratory symptoms. He reports that a couple of nights ago, he had a night where he had persistent epigastric pain that kept him up at night, and for which he took several tums. Aside from that instance, the patient has been feeling well and has no acute complaints. He reports that he continues to have lower extremity edema, which he states is chronic, in addition to the chronic bilateral LE "tightness" with ambulation. The patient also reports that nearly 10 days ago he began having blistering of his feet with associated redness and some drainage. He was seen by his PCP on 11/13 for swollen feet with blistering and found to have suspected cellulitis of the legs. The patient was initially prescribed augmentin, but continued to have symptoms so was prescribed bactrim on 11/16. The patient reports that since then, he has felt well and has no acute complaints. He denies any orthopnea, PND, worsening LE edema, chest pain or shortness of breath.     ED Course:  VS: -120, -140/60-80s, Tmax 36.9C, SpO2 88% on 5L NC with tachypnea, improved on BIPAP. Patient given 1g cefepime and 60 of IV lasix.

## 2023-11-21 NOTE — H&P ADULT - ASSESSMENT
Patient is an 83 year old M with gout, myelodysplastic syndrome, coronary artery disease, carotid artery stenosis, hypertension, hyperlipidemia, chronic kidney disease stage III, renal artery stenosis (s/p left renal artery stent), spinal stenosis, and atrial tachycardia who presents for hypoxia noted at his hematology clinic. Patient admitted for acute hypoxemic respiratory failure likely 2/2 to pulmonary edema from acute heart failure.

## 2023-11-21 NOTE — PROGRESS NOTE ADULT - PROBLEM SELECTOR PLAN 9
Patient on home allopurinol    Plan:  > hold for now given JUAN RAMON on CKD  > Recommend starting uloric on discharge

## 2023-11-21 NOTE — H&P ADULT - PROBLEM SELECTOR PLAN 10
DVT PPx: SCDs given pancytopenia  Diet: regular  Code: DNR/DNI  Dispo: medically active  Communication:    Discharge information:  Pharmacy:  PCP: Dr. Maksim Reddy Patient on home amlodipine, metoprolol    Plan:  > continue with home meds

## 2023-11-21 NOTE — PROGRESS NOTE ADULT - PROBLEM SELECTOR PLAN 1
Patient presented with incidental, asymptomatic hypoxemia at hematology clinic, down to 88% on room air  - in the ED, patient noted to be persistently hypoxemic to 88% on 5L NC with tachypnea, requiring BIPAP  - CT chest without PE, small bilateral pleural effusions, mild pulmonary edema  - ProBNP elevated to 6700, higher in previous admission  - had episode of epigastric "lower chest" pain in ED with associated diaphoresis and respiratory distress, may be atypical presentation of cardiac ischemic pain, but since resolved  - TTE 1/2023: moderate AS, normal RV/LV function  - suspect current presentation may be from volume overload from HF and CKD     Plan:  > continue with diuresis, assess response to lasix 80 mg IV and downtitrate as tolerated  > obtain repeat TTE  > consider cards consult in AM for potential ischemic eval given severe epigastric pain with associated diaphoresis and respiratrory distress, possibly atypical ischemic symptoms + questionable new HF vs. HFpEF  > continue home metoprolol given significant ectopy  > incentive spirometer given atelectasis on CT  > wean off BIPAP as tolerated, currently back on NC Patient presented with incidental, asymptomatic hypoxemia at hematology clinic, down to 88% on room air  - in the ED, patient noted to be persistently hypoxemic to 88% on 5L NC with tachypnea, requiring BIPAP  - CT chest without PE, small bilateral pleural effusions, mild pulmonary edema  - ProBNP elevated to 6700, higher in previous admission  - had episode of epigastric "lower chest" pain in ED with associated diaphoresis and respiratory distress, may be atypical presentation of cardiac ischemic pain, but since resolved  - TTE 1/2023: moderate AS, normal RV/LV function  - suspect current presentation may be from volume overload from HF and CKD     Plan:  > continue with diuresis, assess response to lasix 80 mg IV and downtitrate as tolerated  > f/u TTE  > consider cards consult in AM for potential ischemic eval given severe epigastric pain with associated diaphoresis and respiratrory distress, possibly atypical ischemic symptoms + questionable new HF vs. HFpEF  > continue home metoprolol given significant ectopy  > incentive spirometer given atelectasis on CT  > wean off BIPAP as tolerated, currently back on NC

## 2023-11-21 NOTE — H&P ADULT - PROBLEM SELECTOR PLAN 11
DVT PPx: SCDs given pancytopenia  Diet: regular  Code: DNR/DNI  Dispo: medically active  Communication:    Discharge information:  Pharmacy:  PCP: Dr. Maksim Reddy

## 2023-11-21 NOTE — H&P ADULT - PROBLEM SELECTOR PLAN 2
Patient presented to outpt clinic 1 week ago for feet blisters with initial concern for cellulitis  - blisters appear distal, may be representative of underlying PAD (especially given subjective symptoms of claudication)  - may have superimposed cellulitis, though is now s/p 3 days of augmentin and 4 days of bactrim outpatient  - erythema of anterior shins is chronic per patient and HHA    Plan:  > given high risk and neutropenia, will continue with antibiotics?  > wound care consult Patient presented to outpt clinic 1 week ago for feet blisters with initial concern for cellulitis  - blisters appear distal, may be representative of underlying PAD (especially given subjective symptoms of claudication)  - may have superimposed cellulitis, though is now s/p 3 days of augmentin and 4 days of bactrim outpatient  - erythema of anterior shins is chronic per patient and HHA; likely venous stasis changes    Plan:  > patient likely adequately treated outpatient given a total of 7 days of abx  > currently afebrile, no other evidence of infection, will monitor off abx  > wound care consult Troponins elevated, downtrending  - Likely due to Type 2 MI from demand ischemia in the setting of hypoxia and reduced clearance in the setting of CKD  - No chest pain

## 2023-11-21 NOTE — PROGRESS NOTE ADULT - PROBLEM SELECTOR PLAN 8
Patient with symptoms suggestive of claudication  - blisters may be sequelae of PAD  - severe functional limitation    Plan:  > continue with home cilostazol  > follow up CHER/PVR

## 2023-11-21 NOTE — H&P ADULT - PROBLEM SELECTOR PLAN 5
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 Cabral (private nephro)    Plan:  > continue to monitor, expect improvement with continued diuresis  > avoid nephrotoxic meds Patient with known atrial tachycardia, diagnosed during admission 1/2023   - patient followed by Dr. Reddy for cardiology, and Dr. Feldman for EP  - recently had event monitor outpatient revealing significant runs of supraventricular tachycardia, though asymptomatic during these events  - restarted on BB recently by EP    Plan:  > continue with home beta blocker, can inc if needed Patient found to have prolonged QTc to 505  - may be falsely elevated in the setting of tachycardia with significant ectopic beats  - on fluconazole at home    Plan:  > recheck EKG in AM  > Optimize K, Mg, Ca  > avoid QTc prolonging meds, (fluconazole currently held)

## 2023-11-21 NOTE — H&P ADULT - NSHPSOCIALHISTORY_GEN_ALL_CORE
Lives at home alone with his 24/7 HHA. Ambulates with a walker at baseline. Denies substance use. Used to work as a .

## 2023-11-21 NOTE — H&P ADULT - PROBLEM SELECTOR PLAN 7
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 Cabral (private nephro)    Plan:  > continue to monitor, expect improvement with continued diuresis  > avoid nephrotoxic meds

## 2023-11-21 NOTE — H&P ADULT - ATTENDING COMMENTS
Patient seen and observed at bedside. Admitted for acute hypoxic respiratory failure. Downtitrated to NC and respirating comfortably now. Likely 2/2 to CHF exacerbation from HFpEF. Will diurese, obtain TTE and monitor. QTc prolonged, will hold alexander fluconazole until improved. F/u repeat EKG. Consier cardiology consult if no improvement or pending TTE results. Patient with venous stasis changes on b/l LE. Previous cellulitis seems to have resolved from 7 day course of augmentin and bactrim, asymptomatic now, received cefepime in ED. Will hold off on abx for now.

## 2023-11-21 NOTE — H&P ADULT - NSHPPHYSICALEXAM_GEN_ALL_CORE
VITALS:   T(C): 36.4 (11-21-23 @ 02:00), Max: 36.9 (11-20-23 @ 17:41)  HR: 118 (11-21-23 @ 02:00) (99 - 124)  BP: 139/76 (11-21-23 @ 02:00) (116/70 - 141/74)  RR: 18 (11-21-23 @ 02:00) (16 - 23)  SpO2: 94% (11-21-23 @ 02:00) (84% - 100%)    GENERAL: NAD, lying in bed comfortably on BIPAP  HEAD:  Atraumatic, normocephalic  EYES: EOMI, PERRLA, conjunctiva and sclera clear  ENT: Moist mucous membranes  NECK: Supple, no JVD  HEART: Tachycardic (sinus w PACs on tele at bedside), III/VI systolic murmur  LUNGS: Unlabored respirations.  Clear to auscultation bilaterally, no crackles, wheezing, or rhonchi  ABDOMEN: Distended, but soft, nontender  EXTREMITIES: 2+ lower extremity edema extending to the shins, warm and well perfused, faintly palpable DP pulses equal bilaterally  NERVOUS SYSTEM: A&Ox3, no focal deficits   SKIN: bilateral erythematous skin changes along the shins without skin breaks, L foot: dark, crusted lesion along big toe with multiple small flat blisters along 2nd through 4th digits, R foot: dark crusted lesion along 2nd toe VITALS:   T(C): 36.4 (11-21-23 @ 02:00), Max: 36.9 (11-20-23 @ 17:41)  HR: 118 (11-21-23 @ 02:00) (99 - 124)  BP: 139/76 (11-21-23 @ 02:00) (116/70 - 141/74)  RR: 18 (11-21-23 @ 02:00) (16 - 23)  SpO2: 94% (11-21-23 @ 02:00) (84% - 100%)    GENERAL: NAD, lying in bed comfortably on BIPAP  HEAD:  Atraumatic, normocephalic  EYES: EOMI, PERRLA, conjunctiva and sclera clear  ENT: Moist mucous membranes  NECK: Supple, no JVD  HEART: Tachycardic (sinus w PACs on tele at bedside), III/VI systolic murmur  LUNGS: Unlabored respirations.  Clear to auscultation bilaterally, no crackles, wheezing, or rhonchi  ABDOMEN: Distended, but soft, nontender  EXTREMITIES: 2+ lower extremity edema extending to the shins, warm and well perfused, faintly palpable DP pulses equal bilaterally  NERVOUS SYSTEM: A&Ox3, no focal deficits   SKIN: bilateral erythematous venous stasis skin changes along the shins without skin breaks, L foot: dark, crusted lesion along big toe with multiple small flat blisters along 2nd through 4th digits, R foot: dark crusted lesion along 2nd toe

## 2023-11-21 NOTE — H&P ADULT - PROBLEM SELECTOR PLAN 3
Patient follows with Dr. Zita Mcmahon at [facility]  - was previously on lenolidamide but held given pancytopenia  - patient currently pancytopenic. but appears to be near baseline labs per outpt review  - receiving procrit injections with improvement in pancytopenia    Plan:  > continue with acyclovir and fluconazole ppx  > hematology on board, follow up recs Patient follows with Dr. Zita Mcmahon at [facility]  - was previously on lenolidamide but held given pancytopenia  - patient currently pancytopenic. but appears to be near baseline labs per outpt review  - receiving procrit injections with improvement in pancytopenia    Plan:  > continue with acyclovir for ppx  > fluconazole ppx on hold given prolonged QTc  > hematology on board, follow up recs Patient presented to outpt clinic 1 week ago for feet blisters with initial concern for cellulitis  - blisters appear distal, may be representative of underlying PAD (especially given subjective symptoms of claudication)  - may have superimposed cellulitis, though is now s/p 3 days of augmentin and 4 days of bactrim outpatient  - erythema of anterior shins is chronic per patient and HHA; likely venous stasis changes    Plan:  > patient likely adequately treated outpatient given a total of 7 days of abx  > currently afebrile, no other evidence of infection, will monitor off abx  > wound care consult

## 2023-11-21 NOTE — H&P ADULT - PROBLEM SELECTOR PLAN 4
Patient with known atrial tachycardia, diagnosed during admission 1/2023   - patient followed by Dr. Reddy for cardiology, and Dr. Feldman for EP  - recently had event monitor outpatient revealing significant runs of supraventricular tachycardia, though asymptomatic during these events  - restarted on BB recently by EP    Plan:  > continue with home beta blocker, can inc if needed Patient found to have prolonged QTc to 505  - may be falsely elevated in the setting of tachycardia with significant ectopic beats  - on fluconazole at home    Plan:  > recheck EKG in AM  > avoid QTc prolonging meds, (fluconazole currently held) Patient follows with Dr. Zita Mcmahon at [facility]  - was previously on lenolidamide but held given pancytopenia  - patient currently pancytopenic. but appears to be near baseline labs per outpt review  - receiving procrit injections with improvement in pancytopenia    Plan:  > continue with acyclovir for ppx  > fluconazole ppx on hold given prolonged QTc, restart if QTc improves  > hematology on board, follow up recs

## 2023-11-21 NOTE — H&P ADULT - TIME BILLING
Independently obtaining a history, performing a physical examination, discussing the plan with the patient, ordering medications/tests, documenting clinical information, and coordinating care.

## 2023-11-21 NOTE — PROGRESS NOTE ADULT - PROBLEM SELECTOR PLAN 6
Patient with known atrial tachycardia, diagnosed during admission 1/2023   - patient followed by Dr. Reddy for cardiology, and Dr. Feldman for EP  - recently had event monitor outpatient revealing significant runs of supraventricular tachycardia, though asymptomatic during these events  - restarted on BB recently by EP    Plan:  > continue with home beta blocker, can inc if needed

## 2023-11-21 NOTE — H&P ADULT - CONVERSATION DETAILS
Patient reports that he has 2 children (son and daughter) whom he would like to make decisions in the event that he could not do so. He reports having paperwork to designate them as HCPs. He also reports that he would not like to receive CPR in the event of cardiac arrest or be intubated in the event of respiratory distress not improving with NIPPV.    Patient made DNR/DNI. MOLST in chart.

## 2023-11-21 NOTE — H&P ADULT - NSHPLABSRESULTS_GEN_ALL_CORE
LABS:                          7.9    1.24  )-----------( 103      ( 20 Nov 2023 18:51 )             23.7     11-20    137  |  105  |  35<H>  ----------------------------<  131<H>  4.6   |  21<L>  |  2.64<H>    Ca    9.0      20 Nov 2023 20:24    TPro  6.7  /  Alb  4.1  /  TBili  0.5  /  DBili  x   /  AST  10  /  ALT  9<L>  /  AlkPhos  97  11-20    PT/INR - ( 20 Nov 2023 18:51 )   PT: 12.5 sec;   INR: 1.20 ratio         PTT - ( 20 Nov 2023 18:51 )  PTT:26.8 sec      ECG: sinus rhythm with significant atrial ectopy, no new ST changes      < from: CT Angio Abdomen and Pelvis w/ IV Cont (11.20.23 @ 21:02) >      IMPRESSION:  No thoracic or abdominal aortic aneurysm or dissection.    Small bilateral pleural effusions with mild interlobular septal   thickening which may reflect mild pulmonary interstitial edema.   Compressive bilateral lower lobe atelectasis    --- End of Report ---    < end of copied text >

## 2023-11-21 NOTE — PROGRESS NOTE ADULT - PROBLEM SELECTOR PLAN 5
Patient found to have prolonged QTc to 505  - may be falsely elevated in the setting of tachycardia with significant ectopic beats  - on fluconazole at home    Plan:  > recheck EKG in AM  > Optimize K, Mg, Ca  > avoid QTc prolonging meds, (fluconazole currently held)

## 2023-11-21 NOTE — H&P ADULT - PROBLEM SELECTOR PLAN 1
Patient presented with incidental, asymptomatic hypoxemia at hematology clinic, down to 88% on room air  - in the ED, patient noted to be persistently hypoxemic to 88% on 5L NC with tachypnea, requiring BIPAP  - CT chest without PE, small bilateral pleural effusions, mild pulmonary edema  - ProBNP elevated to 6700, higher in previous admission for afib RVR  - had episode of epigastric "lower chest" pain in ED with associated diaphoresis and respiratory distress, may be atypical presentation of cardiac ischemic pain  - TTE 1/2023: moderate AS, normal RV/LV function  - suspect current presentation may be from volume overload from HF and CKD     Plan:  > continue with diuresis, assess response to lasix 80 mg IV  > obtain repeat TTE  > consider cards consult in AM for potential ischemic eval given severe epigastric pain with associated diaphoresis and respiratrory distress, possibly atypical ischemic symptoms + questionable new HF  > continue home metoprolol given significant ectopy  > incentive spirometer given atelectasis on CT  > wean off BIPAP as tolerated, currently back on NC Patient presented with incidental, asymptomatic hypoxemia at hematology clinic, down to 88% on room air  - in the ED, patient noted to be persistently hypoxemic to 88% on 5L NC with tachypnea, requiring BIPAP  - CT chest without PE, small bilateral pleural effusions, mild pulmonary edema  - ProBNP elevated to 6700, higher in previous admission  - had episode of epigastric "lower chest" pain in ED with associated diaphoresis and respiratory distress, may be atypical presentation of cardiac ischemic pain, but since resolved  - TTE 1/2023: moderate AS, normal RV/LV function  - suspect current presentation may be from volume overload from HF and CKD     Plan:  > continue with diuresis, assess response to lasix 80 mg IV and downtitrate as tolerated  > obtain repeat TTE  > consider cards consult in AM for potential ischemic eval given severe epigastric pain with associated diaphoresis and respiratrory distress, possibly atypical ischemic symptoms + questionable new HF vs. HFpEF  > continue home metoprolol given significant ectopy  > incentive spirometer given atelectasis on CT  > wean off BIPAP as tolerated, currently back on NC

## 2023-11-21 NOTE — H&P ADULT - NSHPOUTPATIENTPROVIDERS_GEN_ALL_CORE
PCP: Dr. Maksim Reddy  Cardiology: Dr. Gabriel Reddy   Hematologist: Dr. Zita Mcmahon  EP: Dr. Luisito Feldman

## 2023-11-22 ENCOUNTER — APPOINTMENT (OUTPATIENT)
Dept: INFUSION THERAPY | Facility: HOSPITAL | Age: 83
End: 2023-11-22

## 2023-11-22 LAB
ALBUMIN SERPL ELPH-MCNC: 3.4 G/DL — SIGNIFICANT CHANGE UP (ref 3.3–5)
ALBUMIN SERPL ELPH-MCNC: 3.4 G/DL — SIGNIFICANT CHANGE UP (ref 3.3–5)
ALP SERPL-CCNC: 94 U/L — SIGNIFICANT CHANGE UP (ref 40–120)
ALP SERPL-CCNC: 94 U/L — SIGNIFICANT CHANGE UP (ref 40–120)
ALT FLD-CCNC: 9 U/L — LOW (ref 10–45)
ALT FLD-CCNC: 9 U/L — LOW (ref 10–45)
ANION GAP SERPL CALC-SCNC: 14 MMOL/L — SIGNIFICANT CHANGE UP (ref 5–17)
ANION GAP SERPL CALC-SCNC: 14 MMOL/L — SIGNIFICANT CHANGE UP (ref 5–17)
ANION GAP SERPL CALC-SCNC: 15 MMOL/L — SIGNIFICANT CHANGE UP (ref 5–17)
ANION GAP SERPL CALC-SCNC: 15 MMOL/L — SIGNIFICANT CHANGE UP (ref 5–17)
AST SERPL-CCNC: 16 U/L — SIGNIFICANT CHANGE UP (ref 10–40)
AST SERPL-CCNC: 16 U/L — SIGNIFICANT CHANGE UP (ref 10–40)
BASOPHILS # BLD AUTO: 0.01 K/UL — SIGNIFICANT CHANGE UP (ref 0–0.2)
BASOPHILS # BLD AUTO: 0.01 K/UL — SIGNIFICANT CHANGE UP (ref 0–0.2)
BASOPHILS NFR BLD AUTO: 1 % — SIGNIFICANT CHANGE UP (ref 0–2)
BASOPHILS NFR BLD AUTO: 1 % — SIGNIFICANT CHANGE UP (ref 0–2)
BILIRUB SERPL-MCNC: 0.5 MG/DL — SIGNIFICANT CHANGE UP (ref 0.2–1.2)
BILIRUB SERPL-MCNC: 0.5 MG/DL — SIGNIFICANT CHANGE UP (ref 0.2–1.2)
BUN SERPL-MCNC: 40 MG/DL — HIGH (ref 7–23)
CALCIUM SERPL-MCNC: 8.7 MG/DL — SIGNIFICANT CHANGE UP (ref 8.4–10.5)
CALCIUM SERPL-MCNC: 8.7 MG/DL — SIGNIFICANT CHANGE UP (ref 8.4–10.5)
CALCIUM SERPL-MCNC: 8.8 MG/DL — SIGNIFICANT CHANGE UP (ref 8.4–10.5)
CALCIUM SERPL-MCNC: 8.8 MG/DL — SIGNIFICANT CHANGE UP (ref 8.4–10.5)
CHLORIDE SERPL-SCNC: 100 MMOL/L — SIGNIFICANT CHANGE UP (ref 96–108)
CHLORIDE SERPL-SCNC: 100 MMOL/L — SIGNIFICANT CHANGE UP (ref 96–108)
CHLORIDE SERPL-SCNC: 102 MMOL/L — SIGNIFICANT CHANGE UP (ref 96–108)
CHLORIDE SERPL-SCNC: 102 MMOL/L — SIGNIFICANT CHANGE UP (ref 96–108)
CHOLEST SERPL-MCNC: 124 MG/DL — SIGNIFICANT CHANGE UP
CHOLEST SERPL-MCNC: 124 MG/DL — SIGNIFICANT CHANGE UP
CK SERPL-CCNC: 45 U/L — SIGNIFICANT CHANGE UP (ref 30–200)
CK SERPL-CCNC: 45 U/L — SIGNIFICANT CHANGE UP (ref 30–200)
CO2 SERPL-SCNC: 21 MMOL/L — LOW (ref 22–31)
CO2 SERPL-SCNC: 21 MMOL/L — LOW (ref 22–31)
CO2 SERPL-SCNC: 22 MMOL/L — SIGNIFICANT CHANGE UP (ref 22–31)
CO2 SERPL-SCNC: 22 MMOL/L — SIGNIFICANT CHANGE UP (ref 22–31)
CREAT SERPL-MCNC: 3.37 MG/DL — HIGH (ref 0.5–1.3)
CREAT SERPL-MCNC: 3.37 MG/DL — HIGH (ref 0.5–1.3)
CREAT SERPL-MCNC: 3.58 MG/DL — HIGH (ref 0.5–1.3)
CREAT SERPL-MCNC: 3.58 MG/DL — HIGH (ref 0.5–1.3)
EGFR: 16 ML/MIN/1.73M2 — LOW
EGFR: 16 ML/MIN/1.73M2 — LOW
EGFR: 17 ML/MIN/1.73M2 — LOW
EGFR: 17 ML/MIN/1.73M2 — LOW
EOSINOPHIL # BLD AUTO: 0.03 K/UL — SIGNIFICANT CHANGE UP (ref 0–0.5)
EOSINOPHIL # BLD AUTO: 0.03 K/UL — SIGNIFICANT CHANGE UP (ref 0–0.5)
EOSINOPHIL NFR BLD AUTO: 2.9 % — SIGNIFICANT CHANGE UP (ref 0–6)
EOSINOPHIL NFR BLD AUTO: 2.9 % — SIGNIFICANT CHANGE UP (ref 0–6)
GLUCOSE SERPL-MCNC: 128 MG/DL — HIGH (ref 70–99)
GLUCOSE SERPL-MCNC: 128 MG/DL — HIGH (ref 70–99)
GLUCOSE SERPL-MCNC: 134 MG/DL — HIGH (ref 70–99)
GLUCOSE SERPL-MCNC: 134 MG/DL — HIGH (ref 70–99)
HCT VFR BLD CALC: 21.5 % — LOW (ref 39–50)
HCT VFR BLD CALC: 21.5 % — LOW (ref 39–50)
HDLC SERPL-MCNC: 27 MG/DL — LOW
HDLC SERPL-MCNC: 27 MG/DL — LOW
HGB BLD-MCNC: 7.3 G/DL — LOW (ref 13–17)
HGB BLD-MCNC: 7.3 G/DL — LOW (ref 13–17)
LIPID PNL WITH DIRECT LDL SERPL: 66 MG/DL — SIGNIFICANT CHANGE UP
LIPID PNL WITH DIRECT LDL SERPL: 66 MG/DL — SIGNIFICANT CHANGE UP
LYMPHOCYTES # BLD AUTO: 0.68 K/UL — LOW (ref 1–3.3)
LYMPHOCYTES # BLD AUTO: 0.68 K/UL — LOW (ref 1–3.3)
LYMPHOCYTES # BLD AUTO: 65.4 % — HIGH (ref 13–44)
LYMPHOCYTES # BLD AUTO: 65.4 % — HIGH (ref 13–44)
MAGNESIUM SERPL-MCNC: 2.4 MG/DL — SIGNIFICANT CHANGE UP (ref 1.6–2.6)
MAGNESIUM SERPL-MCNC: 2.4 MG/DL — SIGNIFICANT CHANGE UP (ref 1.6–2.6)
MCHC RBC-ENTMCNC: 34 GM/DL — SIGNIFICANT CHANGE UP (ref 32–36)
MCHC RBC-ENTMCNC: 34 GM/DL — SIGNIFICANT CHANGE UP (ref 32–36)
MCHC RBC-ENTMCNC: 37.8 PG — HIGH (ref 27–34)
MCHC RBC-ENTMCNC: 37.8 PG — HIGH (ref 27–34)
MCV RBC AUTO: 111.4 FL — HIGH (ref 80–100)
MCV RBC AUTO: 111.4 FL — HIGH (ref 80–100)
MONOCYTES # BLD AUTO: 0.01 K/UL — SIGNIFICANT CHANGE UP (ref 0–0.9)
MONOCYTES # BLD AUTO: 0.01 K/UL — SIGNIFICANT CHANGE UP (ref 0–0.9)
MONOCYTES NFR BLD AUTO: 1 % — LOW (ref 2–14)
MONOCYTES NFR BLD AUTO: 1 % — LOW (ref 2–14)
MRSA PCR RESULT.: SIGNIFICANT CHANGE UP
MRSA PCR RESULT.: SIGNIFICANT CHANGE UP
NEUTROPHILS # BLD AUTO: 0.31 K/UL — LOW (ref 1.8–7.4)
NEUTROPHILS # BLD AUTO: 0.31 K/UL — LOW (ref 1.8–7.4)
NEUTROPHILS NFR BLD AUTO: 29.7 % — LOW (ref 43–77)
NEUTROPHILS NFR BLD AUTO: 29.7 % — LOW (ref 43–77)
NON HDL CHOLESTEROL: 97 MG/DL — SIGNIFICANT CHANGE UP
NON HDL CHOLESTEROL: 97 MG/DL — SIGNIFICANT CHANGE UP
NRBC # BLD: 2 /100 WBCS — HIGH (ref 0–0)
NRBC # BLD: 2 /100 WBCS — HIGH (ref 0–0)
PHOSPHATE SERPL-MCNC: 4.4 MG/DL — SIGNIFICANT CHANGE UP (ref 2.5–4.5)
PHOSPHATE SERPL-MCNC: 4.4 MG/DL — SIGNIFICANT CHANGE UP (ref 2.5–4.5)
PLATELET # BLD AUTO: 117 K/UL — LOW (ref 150–400)
PLATELET # BLD AUTO: 117 K/UL — LOW (ref 150–400)
POTASSIUM SERPL-MCNC: 4.3 MMOL/L — SIGNIFICANT CHANGE UP (ref 3.5–5.3)
POTASSIUM SERPL-MCNC: 4.3 MMOL/L — SIGNIFICANT CHANGE UP (ref 3.5–5.3)
POTASSIUM SERPL-MCNC: 4.5 MMOL/L — SIGNIFICANT CHANGE UP (ref 3.5–5.3)
POTASSIUM SERPL-MCNC: 4.5 MMOL/L — SIGNIFICANT CHANGE UP (ref 3.5–5.3)
POTASSIUM SERPL-SCNC: 4.3 MMOL/L — SIGNIFICANT CHANGE UP (ref 3.5–5.3)
POTASSIUM SERPL-SCNC: 4.3 MMOL/L — SIGNIFICANT CHANGE UP (ref 3.5–5.3)
POTASSIUM SERPL-SCNC: 4.5 MMOL/L — SIGNIFICANT CHANGE UP (ref 3.5–5.3)
POTASSIUM SERPL-SCNC: 4.5 MMOL/L — SIGNIFICANT CHANGE UP (ref 3.5–5.3)
PROT SERPL-MCNC: 6.4 G/DL — SIGNIFICANT CHANGE UP (ref 6–8.3)
PROT SERPL-MCNC: 6.4 G/DL — SIGNIFICANT CHANGE UP (ref 6–8.3)
RBC # BLD: 1.93 M/UL — LOW (ref 4.2–5.8)
RBC # BLD: 1.93 M/UL — LOW (ref 4.2–5.8)
RBC # FLD: 21.8 % — HIGH (ref 10.3–14.5)
RBC # FLD: 21.8 % — HIGH (ref 10.3–14.5)
S AUREUS DNA NOSE QL NAA+PROBE: SIGNIFICANT CHANGE UP
S AUREUS DNA NOSE QL NAA+PROBE: SIGNIFICANT CHANGE UP
SODIUM SERPL-SCNC: 136 MMOL/L — SIGNIFICANT CHANGE UP (ref 135–145)
SODIUM SERPL-SCNC: 136 MMOL/L — SIGNIFICANT CHANGE UP (ref 135–145)
SODIUM SERPL-SCNC: 138 MMOL/L — SIGNIFICANT CHANGE UP (ref 135–145)
SODIUM SERPL-SCNC: 138 MMOL/L — SIGNIFICANT CHANGE UP (ref 135–145)
TRIGL SERPL-MCNC: 183 MG/DL — HIGH
TRIGL SERPL-MCNC: 183 MG/DL — HIGH
URATE SERPL-MCNC: 7.8 MG/DL — SIGNIFICANT CHANGE UP (ref 3.4–8.8)
URATE SERPL-MCNC: 7.8 MG/DL — SIGNIFICANT CHANGE UP (ref 3.4–8.8)
UUN UR-MCNC: 254 MG/DL — SIGNIFICANT CHANGE UP
UUN UR-MCNC: 254 MG/DL — SIGNIFICANT CHANGE UP
WBC # BLD: 1.1 K/UL — LOW (ref 3.8–10.5)
WBC # BLD: 1.1 K/UL — LOW (ref 3.8–10.5)
WBC # FLD AUTO: 1.1 K/UL — LOW (ref 3.8–10.5)
WBC # FLD AUTO: 1.1 K/UL — LOW (ref 3.8–10.5)

## 2023-11-22 PROCEDURE — 99223 1ST HOSP IP/OBS HIGH 75: CPT

## 2023-11-22 PROCEDURE — 99233 SBSQ HOSP IP/OBS HIGH 50: CPT | Mod: GC

## 2023-11-22 RX ORDER — FUROSEMIDE 40 MG
40 TABLET ORAL DAILY
Refills: 0 | Status: DISCONTINUED | OUTPATIENT
Start: 2023-11-22 | End: 2023-11-23

## 2023-11-22 RX ORDER — FLUCONAZOLE 150 MG/1
200 TABLET ORAL DAILY
Refills: 0 | Status: DISCONTINUED | OUTPATIENT
Start: 2023-11-23 | End: 2023-11-23

## 2023-11-22 RX ORDER — FLUCONAZOLE 150 MG/1
200 TABLET ORAL ONCE
Refills: 0 | Status: COMPLETED | OUTPATIENT
Start: 2023-11-22 | End: 2023-11-22

## 2023-11-22 RX ORDER — CHLORHEXIDINE GLUCONATE 213 G/1000ML
1 SOLUTION TOPICAL DAILY
Refills: 0 | Status: DISCONTINUED | OUTPATIENT
Start: 2023-11-22 | End: 2023-11-28

## 2023-11-22 RX ORDER — ISAVUCONAZONIUM SULFATE 40 MG/1
CAPSULE ORAL
Refills: 0 | Status: DISCONTINUED | OUTPATIENT
Start: 2023-11-22 | End: 2023-11-22

## 2023-11-22 RX ORDER — FLUCONAZOLE 150 MG/1
200 TABLET ORAL EVERY 24 HOURS
Refills: 0 | Status: DISCONTINUED | OUTPATIENT
Start: 2023-11-22 | End: 2023-11-22

## 2023-11-22 RX ORDER — CEFEPIME 1 G/1
500 INJECTION, POWDER, FOR SOLUTION INTRAMUSCULAR; INTRAVENOUS DAILY
Refills: 0 | Status: DISCONTINUED | OUTPATIENT
Start: 2023-11-22 | End: 2023-11-22

## 2023-11-22 RX ORDER — METOPROLOL TARTRATE 50 MG
100 TABLET ORAL
Refills: 0 | Status: DISCONTINUED | OUTPATIENT
Start: 2023-11-22 | End: 2023-11-23

## 2023-11-22 RX ADMIN — Medication 400 MILLIGRAM(S): at 05:46

## 2023-11-22 RX ADMIN — Medication 40 MILLIGRAM(S): at 05:46

## 2023-11-22 RX ADMIN — CEFEPIME 100 MILLIGRAM(S): 1 INJECTION, POWDER, FOR SOLUTION INTRAMUSCULAR; INTRAVENOUS at 12:17

## 2023-11-22 RX ADMIN — CHLORHEXIDINE GLUCONATE 1 APPLICATION(S): 213 SOLUTION TOPICAL at 22:04

## 2023-11-22 RX ADMIN — Medication 100 MILLIGRAM(S): at 10:48

## 2023-11-22 RX ADMIN — PANTOPRAZOLE SODIUM 40 MILLIGRAM(S): 20 TABLET, DELAYED RELEASE ORAL at 05:46

## 2023-11-22 RX ADMIN — Medication 40 MILLIGRAM(S): at 22:12

## 2023-11-22 RX ADMIN — SERTRALINE 25 MILLIGRAM(S): 25 TABLET, FILM COATED ORAL at 10:47

## 2023-11-22 RX ADMIN — FLUCONAZOLE 200 MILLIGRAM(S): 150 TABLET ORAL at 16:13

## 2023-11-22 RX ADMIN — GABAPENTIN 300 MILLIGRAM(S): 400 CAPSULE ORAL at 17:38

## 2023-11-22 RX ADMIN — CLOPIDOGREL BISULFATE 75 MILLIGRAM(S): 75 TABLET, FILM COATED ORAL at 10:56

## 2023-11-22 RX ADMIN — Medication 81 MILLIGRAM(S): at 10:48

## 2023-11-22 RX ADMIN — ATORVASTATIN CALCIUM 40 MILLIGRAM(S): 80 TABLET, FILM COATED ORAL at 22:06

## 2023-11-22 RX ADMIN — Medication 400 MILLIGRAM(S): at 17:38

## 2023-11-22 RX ADMIN — GABAPENTIN 300 MILLIGRAM(S): 400 CAPSULE ORAL at 05:46

## 2023-11-22 NOTE — PROGRESS NOTE ADULT - SUBJECTIVE AND OBJECTIVE BOX
Overnight events noted      VITAL:  T(C): , Max: 36.5 (11-21-23 @ 19:48)  T(F): , Max: 97.7 (11-21-23 @ 19:48)  HR: 86 (11-22-23 @ 05:07)  BP: 115/61 (11-22-23 @ 05:07)  BP(mean): --  RR: 18 (11-22-23 @ 05:07)  SpO2: 95% (11-22-23 @ 05:07)  Urine output 4050cc/24h      PHYSICAL EXAM:  Constitutional: NAD at rest on NCO2  HEENT: NCAT, DMM  Neck: Supple, No JVD  Respiratory: (+)bibasilar rales  Cardiovascular: tachy s1s2, (+)1/6 LESA  Gastrointestinal: BS+, soft, NT/ND  Extremities: No peripheral edema b/l  Neurological: reduced generalized strength  Back: no CVAT b/l  Skin: (+)erythema b/l shins    LABS:                        7.3    1.10  )-----------( 117      ( 22 Nov 2023 06:46 )             21.5     Na(136)/K(4.3)/Cl(100)/HCO3(22)/BUN(40)/Cr(3.58)Glu(134)/Ca(8.7)/Mg(2.4)/PO4(4.4)    11-22 @ 06:46  Na(138)/K(4.5)/Cl(103)/HCO3(19)/BUN(35)/Cr(2.84)Glu(123)/Ca(8.8)/Mg(2.4)/PO4(4.3)    11-21 @ 07:25  Na(137)/K(4.6)/Cl(105)/HCO3(21)/BUN(35)/Cr(2.64)Glu(131)/Ca(9.0)/Mg(--)/PO4(--)    11-20 @ 20:24  Na(137)/K(4.8)/Cl(104)/HCO3(20)/BUN(36)/Cr(2.62)Glu(114)/Ca(9.0)/Mg(--)/PO4(--)    11-20 @ 18:51    CPK 45  Uric 7.8    Sodium, Random Urine: 80 mmol/L (11-21 @ 22:30)  Potassium, Random Urine: 37 mmol/L (11-21 @ 22:30)  Chloride, Random Urine: 94 mmol/L (11-21 @ 22:30)  Creatinine, Random Urine: 39 mg/dL (11-21 @ 22:30)    IMAGING:  < from: TTE W or WO Ultrasound Enhancing Agent (11.21.23 @ 09:58) >   1. Technically difficult image quality.   2. Left ventricular systolic function is moderately decreased with an ejection fraction of 40 % by Andersen's method of disks.   3. Basal and mid anterolateral wall, basal and mid inferior wall, basal and mid inferolateral wall, and basal anteroseptal segment are abnormal.   4. There is moderate (grade 2) left ventricular diastolic dysfunction, with elevated filling pressure.   5. Normal right ventricular cavity size and systolic function.   6. There is moderate aortic stenosis. The peak transaortic velocity is 3.00 m/s, peak transaortic gradient is 36.0 mmHg and mean transaortic gradient is 23.0 mmHg with an LVOT/aortic valve VTI ratio of 0.34. The aortic valve area is estimated at 1.06 cm² by the continuity equation. There is no evidence of aortic regurgitation.              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 He had a CT with IV contrast on admission; whereas this could not have accounted for the initial rise in creatinine, it highly likely accounts for the further rise in creatinine from today (as well as the granular casts on UA). Nonoliguric with superb urine output (over 4L) - we should cut back on the diuretics, as the high urine output may be further exacerbating the JUAN RAMON here    (3)Metabolic acidosis - improving, with diuresis    (4)Pancytopenia - MDS    (5)Hypoxia - cardiogenic pulmonary edema, it appears. Improving, with diuresis    (6)ID - on empiric Cefepime - is this needed here? If so, we must reduce the dose, given the JUAN RAMON      RECOMMEND:  (1)If Cefepime is to be continued, then would reduce dose to 500mg iv q24h  (2)Reduce Lasix to 40mg iv q24h  (3)Reduce Gabapentin to 300mg po q24  (4)Continue strict I/Os  (5)BMP+Mg+PO4 daily  (6)No HD for now    Edis Cabral MD  [facility]  Office/on call physician: [phone]  Cell (7a-7p): [phone]       No pain, no sob  shares that he is set for transfusion today      VITAL:  T(C): , Max: 36.5 (11-21-23 @ 19:48)  T(F): , Max: 97.7 (11-21-23 @ 19:48)  HR: 86 (11-22-23 @ 05:07)  BP: 115/61 (11-22-23 @ 05:07)  BP(mean): --  RR: 18 (11-22-23 @ 05:07)  SpO2: 95% (11-22-23 @ 05:07)  Urine output 4050cc/24h      PHYSICAL EXAM:  Constitutional: NAD at rest on NCO2  HEENT: NCAT, DMM  Neck: Supple, No JVD  Respiratory: (+)bibasilar rales  Cardiovascular: reg s1s2, (+)1/6 LESA  Gastrointestinal: BS+, soft, NT/ND  Extremities: No peripheral edema b/l  Neurological: reduced generalized strength  Back: no CVAT b/l  Skin: (+)erythema b/l shins    LABS:                        7.3    1.10  )-----------( 117      ( 22 Nov 2023 06:46 )             21.5     Na(136)/K(4.3)/Cl(100)/HCO3(22)/BUN(40)/Cr(3.58)Glu(134)/Ca(8.7)/Mg(2.4)/PO4(4.4)    11-22 @ 06:46  Na(138)/K(4.5)/Cl(103)/HCO3(19)/BUN(35)/Cr(2.84)Glu(123)/Ca(8.8)/Mg(2.4)/PO4(4.3)    11-21 @ 07:25  Na(137)/K(4.6)/Cl(105)/HCO3(21)/BUN(35)/Cr(2.64)Glu(131)/Ca(9.0)/Mg(--)/PO4(--)    11-20 @ 20:24  Na(137)/K(4.8)/Cl(104)/HCO3(20)/BUN(36)/Cr(2.62)Glu(114)/Ca(9.0)/Mg(--)/PO4(--)    11-20 @ 18:51    CPK 45  Uric 7.8    Sodium, Random Urine: 80 mmol/L (11-21 @ 22:30)  Potassium, Random Urine: 37 mmol/L (11-21 @ 22:30)  Chloride, Random Urine: 94 mmol/L (11-21 @ 22:30)  Creatinine, Random Urine: 39 mg/dL (11-21 @ 22:30)    IMAGING:  < from: TTE W or WO Ultrasound Enhancing Agent (11.21.23 @ 09:58) >   1. Technically difficult image quality.   2. Left ventricular systolic function is moderately decreased with an ejection fraction of 40 % by Andersen's method of disks.   3. Basal and mid anterolateral wall, basal and mid inferior wall, basal and mid inferolateral wall, and basal anteroseptal segment are abnormal.   4. There is moderate (grade 2) left ventricular diastolic dysfunction, with elevated filling pressure.   5. Normal right ventricular cavity size and systolic function.   6. There is moderate aortic stenosis. The peak transaortic velocity is 3.00 m/s, peak transaortic gradient is 36.0 mmHg and mean transaortic gradient is 23.0 mmHg with an LVOT/aortic valve VTI ratio of 0.34. The aortic valve area is estimated at 1.06 cm² by the continuity equation. There is no evidence of aortic regurgitation.              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 He had a CT with IV contrast on admission; whereas this could not have accounted for the initial rise in creatinine, it highly likely accounts for the further rise in creatinine from today (as well as the granular casts on UA). Nonoliguric with superb urine output (over 4L) - we should cut back on the diuretics, as the high urine output may be further exacerbating the JUAN RAMON here    (3)Metabolic acidosis - improving, with diuresis    (4)Pancytopenia - MDS    (5)Hypoxia - cardiogenic pulmonary edema, it appears. Improving, with diuresis    (6)ID - on empiric Cefepime - is this needed here? If so, we must reduce the dose, given the JUAN RAMON      RECOMMEND:  (1)If Cefepime is to be continued, then would reduce dose to 500mg iv q24h  (2)Reduce Lasix to 40mg iv q24h  (3)Reduce Gabapentin to 300mg po q24  (4)Continue strict I/Os  (5)BMP+Mg+PO4 daily  (6)No HD for now    Edis Cabral MD  [facility]  Office/on call physician: [phone]  Cell (7a-7p): [phone]

## 2023-11-22 NOTE — CONSULT NOTE ADULT - SUBJECTIVE AND OBJECTIVE BOX
Cardiology Consult Note   Shayan Ortiz, PGY3  Internal Medicine Resident on Cardiology Consult Service   -[phone] - [facility] [id number]    Patient is a 83y old  Male who presents with a chief complaint of acute hypoxic respiratory failure (22 Nov 2023 08:38)      HPI:  Patient is an 83 year old M with gout, myelodysplastic syndrome, coronary artery disease, carotid artery stenosis, hypertension, hyperlipidemia, chronic kidney disease stage III, renal artery stenosis (s/p left renal artery stent), spinal stenosis, and atrial tachycardia who presents for hypoxia noted at his hematology clinic. The patient reports that he went to the hematology clinic for a regular platelet transfusion, and was incidentally noted to be hypoxic without any respiratory symptoms. He reports that a couple of nights ago, he had a night where he had persistent epigastric pain that kept him up at night, and for which he took several tums. Aside from that instance, the patient has been feeling well and has no acute complaints. He reports that he continues to have lower extremity edema, which he states is chronic, in addition to the chronic bilateral LE "tightness" with ambulation. The patient also reports that nearly 10 days ago he began having blistering of his feet with associated redness and some drainage. He was seen by his PCP on 11/13 for swollen feet with blistering and found to have suspected cellulitis of the legs. The patient was initially prescribed augmentin, but continued to have symptoms so was prescribed bactrim on 11/16. The patient reports that since then, he has felt well and has no acute complaints. He denies any orthopnea, PND, worsening LE edema, chest pain or shortness of breath.     ED Course:  VS: -120, -140/60-80s, Tmax 36.9C, SpO2 88% on 5L NC with tachypnea, improved on BIPAP. Patient given 1g cefepime and 60 of IV lasix.  (21 Nov 2023 00:54)      PAST MEDICAL & SURGICAL HISTORY:  HTN - Hypertension      Hypercholesterolemia      PC (prostate cancer)  s/p surgical resection 3 years ago      Gout      Aneurysm, ascending aorta      H/O prostatectomy      H/O carotid endarterectomy      H/O renal artery stenosis  s/p stent in Left RA      Status post cataract extraction, unspecified laterality             MEDICATIONS  (STANDING):  acyclovir   Oral Tab/Cap 400 milliGRAM(s) Oral two times a day  aspirin enteric coated 81 milliGRAM(s) Oral daily  atorvastatin 40 milliGRAM(s) Oral at bedtime  cefepime   IVPB 500 milliGRAM(s) IV Intermittent daily  chlorhexidine 2% Cloths 1 Application(s) Topical daily  clopidogrel Tablet 75 milliGRAM(s) Oral daily  furosemide   Injectable 40 milliGRAM(s) IV Push daily  gabapentin 300 milliGRAM(s) Oral two times a day  metoprolol succinate  milliGRAM(s) Oral <User Schedule>  pantoprazole    Tablet 40 milliGRAM(s) Oral before breakfast  sertraline 25 milliGRAM(s) Oral daily    MEDICATIONS  (PRN):  aluminum hydroxide/magnesium hydroxide/simethicone Suspension 30 milliLiter(s) Oral every 6 hours PRN Dyspepsia  cyclobenzaprine 5 milliGRAM(s) Oral three times a day PRN Muscle Spasm      FAMILY HISTORY:      SOCIAL HISTORY:    Vital Signs Last 24 Hrs  T(C): 36.5 (22 Nov 2023 05:07), Max: 36.5 (21 Nov 2023 19:48)  T(F): 97.7 (22 Nov 2023 05:07), Max: 97.7 (21 Nov 2023 19:48)  HR: 82 (22 Nov 2023 09:55) (82 - 120)  BP: 115/61 (22 Nov 2023 05:07) (115/61 - 128/75)  BP(mean): --  RR: 18 (22 Nov 2023 05:07) (18 - 18)  SpO2: 94% (22 Nov 2023 09:55) (91% - 96%)    Parameters below as of 22 Nov 2023 05:07  Patient On (Oxygen Delivery Method): nasal cannula  O2 Flow (L/min): 3      General: No acute distress.  HEENT: NCAT.  PERRL.  EOMI.  No scleral icterus or injection.  Moist MM.  No oropharyngeal exudates.    Neck: Supple.  Full ROM.  No JVD.  No thyromegaly. No lymphadenopathy.   Heart: RRR.  Normal S1 and S2.  No murmurs, rubs, or gallops.   Lungs: CTAB. No wheezes, crackles, or rhonchi.    Abdomen: BS+, soft, NT/ND.  No organomegaly.  Skin: Warm and dry.  No rashes.  Extremities: No edema, clubbing, or cyanosis.  2+ peripheral pulses b/l.  Musculoskeletal: No deformities.  No spinal or paraspinal tenderness.  Neuro: A&Ox3.  CN II-XII intact.  5/5 strength in UE and LE b/l.  Tactile sensation intact in UE and LE b/l.  Cerebellar function intact         LABS:                        7.3    1.10  )-----------( 117      ( 22 Nov 2023 06:46 )             21.5     11-22    136  |  100  |  40<H>  ----------------------------<  134<H>  4.3   |  22  |  3.58<H>    Ca    8.7      22 Nov 2023 06:46  Phos  4.4     11-22  Mg     2.4     11-22    TPro  6.4  /  Alb  3.4  /  TBili  0.5  /  DBili  x   /  AST  16  /  ALT  9<L>  /  AlkPhos  94  11-22    CARDIAC MARKERS ( 22 Nov 2023 06:46 )  x     / x     / 45 U/L / x     / x          PT/INR - ( 20 Nov 2023 18:51 )   PT: 12.5 sec;   INR: 1.20 ratio         PTT - ( 20 Nov 2023 18:51 )  PTT:26.8 sec  Urinalysis Basic - ( 22 Nov 2023 06:46 )    Color: x / Appearance: x / SG: x / pH: x  Gluc: 134 mg/dL / Ketone: x  / Bili: x / Urobili: x   Blood: x / Protein: x / Nitrite: x   Leuk Esterase: x / RBC: x / WBC x   Sq Epi: x / Non Sq Epi: x / Bacteria: x      I&O's Summary    21 Nov 2023 07:01  -  22 Nov 2023 07:00  --------------------------------------------------------  IN: 0 mL / OUT: 4050 mL / NET: -4050 mL      BNP  RADIOLOGY & ADDITIONAL STUDIES: Cardiology Consult Note   Shayan Ortiz, PGY3  Internal Medicine Resident on Cardiology Consult Service   -[phone] - [facility] [address]    Patient is a 83y old  Male who presents with a chief complaint of acute hypoxic respiratory failure (22 Nov 2023 08:38)      HPI:  Patient is an 83 year old M with gout, myelodysplastic syndrome, coronary artery disease, carotid artery stenosis, hypertension, hyperlipidemia, chronic kidney disease stage III, renal artery stenosis (s/p left renal artery stent 2020), spinal stenosis, and atrial tachycardia who presents for hypoxia noted at his hematology clinic. The patient reports that he went to the hematology clinic for a regular platelet transfusion, and was incidentally noted to be hypoxic without any respiratory symptoms. He reports that a couple of nights ago, he had a night where he had persistent epigastric pain that kept him up at night, and for which he took several tums. Aside from that instance, the patient has been feeling well and has no acute complaints. He reports that he continues to have lower extremity edema, which he states is chronic, in addition to the chronic bilateral LE "tightness" with ambulation. The patient also reports that nearly 10 days ago he began having blistering of his feet with associated redness and some drainage. He was seen by his PCP on 11/13 for swollen feet with blistering and found to have suspected cellulitis of the legs. The patient was initially prescribed augmentin, but continued to have symptoms so was prescribed bactrim on 11/16. The patient reports that since then, he has felt well and has no acute complaints. He denies any orthopnea, PND, worsening LE edema, chest pain or shortness of breath.     ED Course:  VS: -120, -140/60-80s, Tmax 36.9C, SpO2 88% on 5L NC with tachypnea, improved on BIPAP. Patient given 1g cefepime and 60 of IV lasix.  (21 Nov 2023 00:54)      PAST MEDICAL & SURGICAL HISTORY:  HTN - Hypertension      Hypercholesterolemia      PC (prostate cancer)  s/p surgical resection 3 years ago      Gout      Aneurysm, ascending aorta      H/O prostatectomy      H/O carotid endarterectomy      H/O renal artery stenosis  s/p stent in Left RA      Status post cataract extraction, unspecified laterality             MEDICATIONS  (STANDING):  acyclovir   Oral Tab/Cap 400 milliGRAM(s) Oral two times a day  aspirin enteric coated 81 milliGRAM(s) Oral daily  atorvastatin 40 milliGRAM(s) Oral at bedtime  cefepime   IVPB 500 milliGRAM(s) IV Intermittent daily  chlorhexidine 2% Cloths 1 Application(s) Topical daily  clopidogrel Tablet 75 milliGRAM(s) Oral daily  furosemide   Injectable 40 milliGRAM(s) IV Push daily  gabapentin 300 milliGRAM(s) Oral two times a day  metoprolol succinate  milliGRAM(s) Oral <User Schedule>  pantoprazole    Tablet 40 milliGRAM(s) Oral before breakfast  sertraline 25 milliGRAM(s) Oral daily    MEDICATIONS  (PRN):  aluminum hydroxide/magnesium hydroxide/simethicone Suspension 30 milliLiter(s) Oral every 6 hours PRN Dyspepsia  cyclobenzaprine 5 milliGRAM(s) Oral three times a day PRN Muscle Spasm      FAMILY HISTORY:      SOCIAL HISTORY:    Vital Signs Last 24 Hrs  T(C): 36.5 (22 Nov 2023 05:07), Max: 36.5 (21 Nov 2023 19:48)  T(F): 97.7 (22 Nov 2023 05:07), Max: 97.7 (21 Nov 2023 19:48)  HR: 82 (22 Nov 2023 09:55) (82 - 120)  BP: 115/61 (22 Nov 2023 05:07) (115/61 - 128/75)  BP(mean): --  RR: 18 (22 Nov 2023 05:07) (18 - 18)  SpO2: 94% (22 Nov 2023 09:55) (91% - 96%)    Parameters below as of 22 Nov 2023 05:07  Patient On (Oxygen Delivery Method): nasal cannula  O2 Flow (L/min): 3      General: No acute distress.  HEENT: NCAT.  Moist MM.   Heart: RRR.  Normal S1 and S2.  systolic murmur   Lungs: decreased breath sounds at bases, wearing nasal cannula O2  Abdomen: BS+, soft, NT/ND.  Skin: Warm and dry.  No rash. blisters on underside of toes, healing  Extremities: No edema, clubbing, or cyanosis.  2+ peripheral pulses b/l.  Neuro: A&Ox3.        LABS:                        7.3    1.10  )-----------( 117      ( 22 Nov 2023 06:46 )             21.5     11-22    136  |  100  |  40<H>  ----------------------------<  134<H>  4.3   |  22  |  3.58<H>    Ca    8.7      22 Nov 2023 06:46  Phos  4.4     11-22  Mg     2.4     11-22    TPro  6.4  /  Alb  3.4  /  TBili  0.5  /  DBili  x   /  AST  16  /  ALT  9<L>  /  AlkPhos  94  11-22    CARDIAC MARKERS ( 22 Nov 2023 06:46 )  x     / x     / 45 U/L / x     / x          PT/INR - ( 20 Nov 2023 18:51 )   PT: 12.5 sec;   INR: 1.20 ratio         PTT - ( 20 Nov 2023 18:51 )  PTT:26.8 sec  Urinalysis Basic - ( 22 Nov 2023 06:46 )    Color: x / Appearance: x / SG: x / pH: x  Gluc: 134 mg/dL / Ketone: x  / Bili: x / Urobili: x   Blood: x / Protein: x / Nitrite: x   Leuk Esterase: x / RBC: x / WBC x   Sq Epi: x / Non Sq Epi: x / Bacteria: x      I&O's Summary    21 Nov 2023 07:01  -  22 Nov 2023 07:00  --------------------------------------------------------  IN: 0 mL / OUT: 4050 mL / NET: -4050 mL      BNP  RADIOLOGY & ADDITIONAL STUDIES:

## 2023-11-22 NOTE — PROGRESS NOTE ADULT - PROBLEM SELECTOR PLAN 6
Patient with known atrial tachycardia, diagnosed during admission 1/2023   - patient followed by Dr. Reddy for cardiology, and Dr. 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 Cabral (private nephro)  - FeUrea 58.3%, c/w instrinsic kidney disease    Plan:  > continue to monitor, expect improvement with continued diuresis  > avoid nephrotoxic meds  -decrease lasix to 40 mg qD

## 2023-11-22 NOTE — PROGRESS NOTE ADULT - SUBJECTIVE AND OBJECTIVE BOX
PROGRESS NOTE:   Authored by Dr. Michelle Bravo MD (PGY-3). Pager [facility] [phone]/ [facility] [id number]    Patient is a 83y old  Male who presents with a chief complaint of acute hypoxic respiratory failure (22 Nov 2023 10:50)      SUBJECTIVE / OVERNIGHT EVENTS:  No acute events overnight.     MEDICATIONS  (STANDING):  acyclovir   Oral Tab/Cap 400 milliGRAM(s) Oral two times a day  aspirin enteric coated 81 milliGRAM(s) Oral daily  atorvastatin 40 milliGRAM(s) Oral at bedtime  chlorhexidine 2% Cloths 1 Application(s) Topical daily  clopidogrel Tablet 75 milliGRAM(s) Oral daily  fluconAZOLE   Tablet 200 milliGRAM(s) Oral every 24 hours  furosemide   Injectable 40 milliGRAM(s) IV Push daily  gabapentin 300 milliGRAM(s) Oral two times a day  metoprolol succinate  milliGRAM(s) Oral <User Schedule>  pantoprazole    Tablet 40 milliGRAM(s) Oral before breakfast  sertraline 25 milliGRAM(s) Oral daily    MEDICATIONS  (PRN):  aluminum hydroxide/magnesium hydroxide/simethicone Suspension 30 milliLiter(s) Oral every 6 hours PRN Dyspepsia  cyclobenzaprine 5 milliGRAM(s) Oral three times a day PRN Muscle Spasm      CAPILLARY BLOOD GLUCOSE        I&O's Summary    21 Nov 2023 07:01  -  22 Nov 2023 07:00  --------------------------------------------------------  IN: 0 mL / OUT: 4050 mL / NET: -4050 mL        PHYSICAL EXAM:  Vital Signs Last 24 Hrs  T(C): 36.8 (22 Nov 2023 11:05), Max: 36.8 (22 Nov 2023 11:05)  T(F): 98.2 (22 Nov 2023 11:05), Max: 98.2 (22 Nov 2023 11:05)  HR: 115 (22 Nov 2023 11:05) (82 - 120)  BP: 123/69 (22 Nov 2023 11:05) (115/61 - 128/75)  BP(mean): --  RR: 18 (22 Nov 2023 11:05) (18 - 18)  SpO2: 99% (22 Nov 2023 11:05) (91% - 99%)    Parameters below as of 22 Nov 2023 11:05  Patient On (Oxygen Delivery Method): nasal cannula  O2 Flow (L/min): 3      CONSTITUTIONAL: NAD  HEENT: MMM, EOMI, PERRLA  NECK: supple  RESPIRATORY: Normal respiratory effort; lungs are clear to auscultation bilaterally  CARDIOVASCULAR: Regular rate and rhythm, normal S1 and S2, no murmur/rub/gallop; No lower extremity edema  ABDOMEN: Nontender to palpation, normoactive bowel sounds, no rebound/guarding; No hepatosplenomegaly  MUSCULOSKELETAL: no clubbing or cyanosis of digits; no joint swelling or tenderness to palpation  NEURO: alert and oriented to person, place, and time. Moving all four extremities, sensation grossly intact  PSYCH: alert and oriented to person, place, and time; affect appropriate  SKIN: No rash    LABS:                        7.3    1.10  )-----------( 117      ( 22 Nov 2023 06:46 )             21.5     11-22    136  |  100  |  40<H>  ----------------------------<  134<H>  4.3   |  22  |  3.58<H>    Ca    8.7      22 Nov 2023 06:46  Phos  4.4     11-22  Mg     2.4     11-22    TPro  6.4  /  Alb  3.4  /  TBili  0.5  /  DBili  x   /  AST  16  /  ALT  9<L>  /  AlkPhos  94  11-22    PT/INR - ( 20 Nov 2023 18:51 )   PT: 12.5 sec;   INR: 1.20 ratio         PTT - ( 20 Nov 2023 18:51 )  PTT:26.8 sec  CARDIAC MARKERS ( 22 Nov 2023 06:46 )  x     / x     / 45 U/L / x     / x          Urinalysis Basic - ( 22 Nov 2023 06:46 )    Color: x / Appearance: x / SG: x / pH: x  Gluc: 134 mg/dL / Ketone: x  / Bili: x / Urobili: x   Blood: x / Protein: x / Nitrite: x   Leuk Esterase: x / RBC: x / WBC x   Sq Epi: x / Non Sq Epi: x / Bacteria: x        Culture - Blood (collected 20 Nov 2023 19:50)  Source: .Blood Blood  Preliminary Report (22 Nov 2023 01:03):    No growth at 24 hours    Culture - Blood (collected 20 Nov 2023 19:30)  Source: .Blood Blood  Preliminary Report (22 Nov 2023 01:03):    No growth at 24 hours        Tele Reviewed:    RADIOLOGY & ADDITIONAL TESTS:  Results Reviewed:   Imaging Personally Reviewed:  Electrocardiogram Personally Reviewed:     PROGRESS NOTE:   Authored by Dr. Michelle Bravo MD (PGY-3). Pager [facility] [phone]/ [facility] [id number]    Patient is a 83y old  Male who presents with a chief complaint of acute hypoxic respiratory failure (22 Nov 2023 10:50)    SUBJECTIVE / OVERNIGHT EVENTS:  No acute events overnight.     Patient states he feels well. He denies chest pain, heartburn, SOB, orthopnea.    MEDICATIONS  (STANDING):  acyclovir   Oral Tab/Cap 400 milliGRAM(s) Oral two times a day  aspirin enteric coated 81 milliGRAM(s) Oral daily  atorvastatin 40 milliGRAM(s) Oral at bedtime  chlorhexidine 2% Cloths 1 Application(s) Topical daily  clopidogrel Tablet 75 milliGRAM(s) Oral daily  fluconAZOLE   Tablet 200 milliGRAM(s) Oral every 24 hours  furosemide   Injectable 40 milliGRAM(s) IV Push daily  gabapentin 300 milliGRAM(s) Oral two times a day  metoprolol succinate  milliGRAM(s) Oral <User Schedule>  pantoprazole    Tablet 40 milliGRAM(s) Oral before breakfast  sertraline 25 milliGRAM(s) Oral daily    MEDICATIONS  (PRN):  aluminum hydroxide/magnesium hydroxide/simethicone Suspension 30 milliLiter(s) Oral every 6 hours PRN Dyspepsia  cyclobenzaprine 5 milliGRAM(s) Oral three times a day PRN Muscle Spasm      CAPILLARY BLOOD GLUCOSE        I&O's Summary    21 Nov 2023 07:01  -  22 Nov 2023 07:00  --------------------------------------------------------  IN: 0 mL / OUT: 4050 mL / NET: -4050 mL        PHYSICAL EXAM:  Vital Signs Last 24 Hrs  T(C): 36.8 (22 Nov 2023 11:05), Max: 36.8 (22 Nov 2023 11:05)  T(F): 98.2 (22 Nov 2023 11:05), Max: 98.2 (22 Nov 2023 11:05)  HR: 115 (22 Nov 2023 11:05) (82 - 120)  BP: 123/69 (22 Nov 2023 11:05) (115/61 - 128/75)  BP(mean): --  RR: 18 (22 Nov 2023 11:05) (18 - 18)  SpO2: 99% (22 Nov 2023 11:05) (91% - 99%)    Parameters below as of 22 Nov 2023 11:05  Patient On (Oxygen Delivery Method): nasal cannula  O2 Flow (L/min): 3      CONSTITUTIONAL: NAD  HEENT: MMM, EOMI, PERRLA  NECK: supple, +JVD  RESPIRATORY: Normal respiratory effort; lungs are clear to auscultation bilaterally  CARDIOVASCULAR: Regular rate and rhythm, normal S1 and S2, no murmur/rub/gallop; No LE edema  ABDOMEN: Nontender to palpation, normoactive bowel sounds, no rebound/guarding; No hepatosplenomegaly  MUSCULOSKELETAL: no clubbing or cyanosis of digits; no joint swelling or tenderness to palpation  NEURO: A&Ox4, no focal neuro deficits, moving all four extremities, sensation grossly intact  PSYCH:  affect appropriate  SKIN: venous stasis on b/l shins. No rash    LABS:                        7.3    1.10  )-----------( 117      ( 22 Nov 2023 06:46 )             21.5     11-22    136  |  100  |  40<H>  ----------------------------<  134<H>  4.3   |  22  |  3.58<H>    Ca    8.7      22 Nov 2023 06:46  Phos  4.4     11-22  Mg     2.4     11-22    TPro  6.4  /  Alb  3.4  /  TBili  0.5  /  DBili  x   /  AST  16  /  ALT  9<L>  /  AlkPhos  94  11-22    PT/INR - ( 20 Nov 2023 18:51 )   PT: 12.5 sec;   INR: 1.20 ratio         PTT - ( 20 Nov 2023 18:51 )  PTT:26.8 sec  CARDIAC MARKERS ( 22 Nov 2023 06:46 )  x     / x     / 45 U/L / x     / x          Urinalysis Basic - ( 22 Nov 2023 06:46 )    Color: x / Appearance: x / SG: x / pH: x  Gluc: 134 mg/dL / Ketone: x  / Bili: x / Urobili: x   Blood: x / Protein: x / Nitrite: x   Leuk Esterase: x / RBC: x / WBC x   Sq Epi: x / Non Sq Epi: x / Bacteria: x        Culture - Blood (collected 20 Nov 2023 19:50)  Source: .Blood Blood  Preliminary Report (22 Nov 2023 01:03):    No growth at 24 hours    Culture - Blood (collected 20 Nov 2023 19:30)  Source: .Blood Blood  Preliminary Report (22 Nov 2023 01:03):    No growth at 24 hours        Tele Reviewed:    RADIOLOGY & ADDITIONAL TESTS:  Results Reviewed:   Imaging Personally Reviewed:  Electrocardiogram Personally Reviewed:

## 2023-11-22 NOTE — PROGRESS NOTE ADULT - PROBLEM SELECTOR PLAN 7
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 Cabral (private nephro)    Plan:  > continue to monitor, expect improvement with continued diuresis  > avoid nephrotoxic meds Patient with symptoms suggestive of claudication  - blisters may be sequelae of PAD  - severe functional limitation    Plan:  > hold home cilostazol due to    > follow up CH[facility]/PVR

## 2023-11-22 NOTE — PROGRESS NOTE ADULT - PROBLEM SELECTOR PLAN 4
Patient follows with Dr. Zita Mcmahon at [facility]  - was previously on lenolidamide but held given pancytopenia  - patient currently pancytopenic. but appears to be near baseline labs per outpt review  - receiving procrit injections with improvement in pancytopenia    Plan:  > continue with acyclovir for ppx  > fluconazole ppx on hold given prolonged QTc, restart if QTc improves  > hematology on board, appreciate recs RESOLVED   Patient found to have prolonged QTc to 505  - may be falsely elevated in the setting of tachycardia with significant ectopic beats  - on fluconazole at home    Plan:  > QTc 470 today. resume home fluconazole for fungal ppx  > Optimize K, Mg, Ca  > avoid QTc prolonging meds

## 2023-11-22 NOTE — CONSULT NOTE ADULT - ASSESSMENT
84 y/o M with PMH of gout, myelodysplastic syndrome, coronary artery disease, carotid artery stenosis, hypertension, hyperlipidemia, chronic kidney disease stage III, renal artery stenosis (s/p left renal artery stent), spinal stenosis, and atrial tachycardia initially presenting with AHRF, concern for volume overload with episodic lower chest pain. Cardiology consulted for new HFrEF with WMA.      #HFrEF likely 2/2 known CAD  Previous TTE 1/2023: moderate AS, normal RV/LV function  TTE 11/2023: LVEF 40% - basal and mid anterolateral, basal and mid inferior, basal and mid inferolateral, and basal anteroseptal WMAs noted. Grade II diastolic dysfunction. Moderate AS, KEENA 1.06 cm.  pBNP 6702, trop 154 -> 145  [facility] 3/18/2021: pRCA 100% , mLAD 60%, OM1 60% - medical management, PCI of  deferred at that time      Shayan Ortiz MD MPH  PGY-3. Internal Medicine      ** Recommendations are not final until note is cosigned by attending ** 84 y/o M with PMH of gout, myelodysplastic syndrome, coronary artery disease, carotid artery stenosis, hypertension, hyperlipidemia, chronic kidney disease stage III, renal artery stenosis (s/p left renal artery stent), spinal stenosis, and atrial tachycardia initially presenting with AHRF, concern for volume overload with episodic lower chest pain. Cardiology consulted for new HFrEF with WMA.      #HFrEF likely 2/2 known CAD  Previous TTE 1/2023: moderate AS, normal RV/LV function  TTE 11/2023: LVEF 40% - basal and mid anterolateral, basal and mid inferior, basal and mid inferolateral, and basal anteroseptal WMAs noted. Grade II diastolic dysfunction. Moderate AS, KEENA 1.06 cm.  pBNP 6702, trop 154 -> 145  C 3/18/2021: pRCA 100% , mLAD 60%, OM1 60% - medical management, PCI of  deferred at that time    -would c/w toprol  mg daily for now  -increase atorvastatin 40 mg to 80 mg  -c/w aspirin and plavix  -trend SCr, avoid nephrotoxins, would like to see improvement in kidney function prior to further ischemic workup  -c/w lasix 40 mg IV daily for diuresis  -replete K > 4, Mg > 2  -daily standing weights, strict I/Os      Shayan Ortiz MD MPH  PGY-3. Internal Medicine      ** Recommendations are not final until note is cosigned by attending ** 84 y/o M with PMH of gout, myelodysplastic syndrome, coronary artery disease, carotid artery stenosis, hypertension, hyperlipidemia, chronic kidney disease stage III, renal artery stenosis (s/p left renal artery stent), spinal stenosis, and atrial tachycardia initially presenting with AHRF, concern for volume overload with episodic lower chest pain. Cardiology consulted for new HFrEF with WMA.      #HFrEF likely 2/2 known CAD  Previous TTE 1/2023: moderate AS, normal RV/LV function  TTE 11/2023: LVEF 40% - basal and mid anterolateral, basal and mid inferior, basal and mid inferolateral, and basal anteroseptal WMAs noted. Grade II diastolic dysfunction. Moderate AS, KEENA 1.06 cm.  pBNP 6702, trop 154 -> 145  C 3/18/2021: pRCA 100% , mLAD 60%, OM1 60% - medical management, PCI of  deferred at that time    -would c/w toprol  mg daily for now  -increase atorvastatin 40 mg to 80 mg  -c/w aspirin and plavix  -trend SCr, avoid nephrotoxins, would like to see improvement in kidney function prior to further ischemic workup  -c/w lasix 40 mg IV daily for diuresis  -replete K > 4, Mg > 2  -daily standing weights, strict I/Os      Shayan Ortiz MD MPH  PGY-3. Internal Medicine

## 2023-11-22 NOTE — PROGRESS NOTE ADULT - PROBLEM SELECTOR PLAN 1
Patient presented with incidental, asymptomatic hypoxemia at hematology clinic, down to 88% on room air  - in the ED, patient noted to be persistently hypoxemic to 88% on 5L NC with tachypnea, requiring BIPAP  - CT chest without PE, small bilateral pleural effusions, mild pulmonary edema  - ProBNP elevated to 6700, higher in previous admission  - had episode of epigastric "lower chest" pain in ED with associated diaphoresis and respiratory distress, may be atypical presentation of cardiac ischemic pain, but since resolved  - TTE 1/2023: moderate AS, normal RV/LV function  - suspect current presentation may be from volume overload from HF and CKD     Plan:  > continue with diuresis, assess response to lasix 80 mg IV and downtitrate as tolerated  > f/u TTE  > consider cards consult in AM for potential ischemic eval given severe epigastric pain with associated diaphoresis and respiratrory distress, possibly atypical ischemic symptoms + questionable new HF vs. HFpEF  > continue home metoprolol given significant ectopy  > incentive spirometer given atelectasis on CT  > wean off BIPAP as tolerated, currently back on NC Patient presented with incidental, asymptomatic hypoxemia at hematology clinic, down to 88% on room air  - in the ED, patient noted to be persistently hypoxemic to 88% on 5L NC with tachypnea, requiring BIPAP  - CT chest without PE, small bilateral pleural effusions, mild pulmonary edema  - ProBNP elevated to 6700, higher in previous admission  - had episode of epigastric "lower chest" pain in ED with associated diaphoresis and respiratory distress, may be atypical presentation of cardiac ischemic pain, but since resolved  - TTE 1/2023: moderate AS, normal RV/LV function    Plan:  > continue with lasix   > TTE 11/21 EF 40%, moderate LV diastolic dysfunction, basal and mid anterolateral wall, basal and mid inferior wall, basal and mid inferolateral wall, and basal anteroseptal segment are abnormal, moderate AS  > cards consulted for newly-reduced EF with new wall motional abnl and atypical angina  > increase home metoprolol to 100 mg ER qD given significant ectopy and atrial tachycardia  > incentive spirometer given atelectasis on CT  > wean off supplemental O2 as tolerated

## 2023-11-22 NOTE — PROGRESS NOTE ADULT - PROBLEM SELECTOR PLAN 3
Patient presented to outpt clinic 1 week ago for feet blisters with initial concern for cellulitis  - blisters appear distal, may be representative of underlying PAD (especially given subjective symptoms of claudication)  - may have superimposed cellulitis, though is now s/p 3 days of augmentin and 4 days of bactrim outpatient  - erythema of anterior shins is chronic per patient and HHA; likely venous stasis changes    Plan:  > patient likely adequately treated outpatient given a total of 7 days of abx  > currently afebrile, no other evidence of infection, will monitor off abx  > wound care consult Patient follows with Dr. Zita Mcmahon at [facility]  - was previously on lenolidamide but held given pancytopenia  - patient currently pancytopenic. but appears to be near baseline labs per outpt review  - receiving procrit injections with improvement in pancytopenia    Plan:  > continue with acyclovir for ppx  > fluconazole ppx  > hematology on board, appreciate recs  > maintain Hgb>8 given history of CAD

## 2023-11-22 NOTE — PROGRESS NOTE ADULT - PROBLEM SELECTOR PLAN 5
Patient found to have prolonged QTc to 505  - may be falsely elevated in the setting of tachycardia with significant ectopic beats  - on fluconazole at home    Plan:  > recheck EKG in AM  > Optimize K, Mg, Ca  > avoid QTc prolonging meds, (fluconazole currently held) Patient with known atrial tachycardia, diagnosed during admission 1/2023   - patient followed by Dr. Reddy for cardiology, and Dr. Feldman for EP  - recently had event monitor outpatient revealing significant runs of supraventricular tachycardia, though asymptomatic during these events  - restarted on BB recently by EP    Plan:  > increase metoprolol succinate to 100 mg qD

## 2023-11-22 NOTE — PROGRESS NOTE ADULT - PROBLEM SELECTOR PLAN 2
Troponins elevated, downtrending  - Likely due to Type 2 MI from demand ischemia in the setting of hypoxia and reduced clearance in the setting of CKD  - No chest pain  - f/u TTE Troponins elevated, downtrending  - Likely due to Type 2 MI from demand ischemia in the setting of hypoxia and reduced clearance in the setting of CKD  - ?atypical angina. 2 week history of heartburn and epigastric pain, not worsened by exertion

## 2023-11-22 NOTE — PROGRESS NOTE ADULT - PROBLEM SELECTOR PLAN 10
Patient on home amlodipine, metoprolol    Plan:  > continue with home meds
Patient on home amlodipine, metoprolol    Plan:  > continue with home meds

## 2023-11-22 NOTE — PROGRESS NOTE ADULT - PROBLEM SELECTOR PLAN 9
Patient on home allopurinol    Plan:  > hold for now given JUAN RAMON on CKD  > Recommend starting uloric on discharge Patient on home amlodipine, metoprolol    Plan:  > continue with home meds

## 2023-11-22 NOTE — CONSULT NOTE ADULT - ATTENDING COMMENTS
83 year old man with MDS and atherosclerotic vascular disease with prior coronary, carotid, and renal artery interventions (no history of resistant hypertension) who is seen of acute decompensated heart failure with pulmonary edema in the setting of new reduction in LVEF and rising creatinine.    He is improving with diuresis.    He denies chest pain, though recent gastritis may be anginal equivalent.  MDS has been managed with lenalidomide and supportive transfusions. Decitabine was recently added.    To continue to optimize volume status with IV furosemide for now  Optimize medical therapy for atherosclerosis and HF (intensify statin)  Would benefit from and SGLT-2 inhibitor given CKD and HF  Would repeat chest imaging (non-contrast) in a day or two to re-assess pleural effusions    UBALDO Dennis MD [facility]  Cardio-Oncology
SHOAIB Moya is a 83 year old male with myelodysplasia seen in the [place] ER for evaluation of hypoxia and pulmonary edema. He is comfortable at bed rest on nasal oxygen and use of diuresis has improved his blood oxygen levels. Treatment of packed red cells may optimize his therapy ; would not exceed HGB of HgB given his limited cardiac reserve.

## 2023-11-22 NOTE — PROGRESS NOTE ADULT - PROBLEM SELECTOR PLAN 8
Patient with symptoms suggestive of claudication  - blisters may be sequelae of PAD  - severe functional limitation    Plan:  > continue with home cilostazol  > follow up [facility] Patient on home allopurinol    Plan:  > hold for now given JUAN RAMON on CKD  > Recommend starting uloric on discharge

## 2023-11-23 DIAGNOSIS — I50.9 HEART FAILURE, UNSPECIFIED: ICD-10-CM

## 2023-11-23 LAB
ALBUMIN SERPL ELPH-MCNC: 3.7 G/DL — SIGNIFICANT CHANGE UP (ref 3.3–5)
ALBUMIN SERPL ELPH-MCNC: 3.7 G/DL — SIGNIFICANT CHANGE UP (ref 3.3–5)
ALP SERPL-CCNC: 92 U/L — SIGNIFICANT CHANGE UP (ref 40–120)
ALP SERPL-CCNC: 92 U/L — SIGNIFICANT CHANGE UP (ref 40–120)
ALT FLD-CCNC: 9 U/L — LOW (ref 10–45)
ALT FLD-CCNC: 9 U/L — LOW (ref 10–45)
ANION GAP SERPL CALC-SCNC: 13 MMOL/L — SIGNIFICANT CHANGE UP (ref 5–17)
ANION GAP SERPL CALC-SCNC: 13 MMOL/L — SIGNIFICANT CHANGE UP (ref 5–17)
APTT BLD: 27.2 SEC — SIGNIFICANT CHANGE UP (ref 24.5–35.6)
APTT BLD: 27.2 SEC — SIGNIFICANT CHANGE UP (ref 24.5–35.6)
AST SERPL-CCNC: 17 U/L — SIGNIFICANT CHANGE UP (ref 10–40)
AST SERPL-CCNC: 17 U/L — SIGNIFICANT CHANGE UP (ref 10–40)
BASOPHILS # BLD AUTO: 0.02 K/UL — SIGNIFICANT CHANGE UP (ref 0–0.2)
BASOPHILS # BLD AUTO: 0.02 K/UL — SIGNIFICANT CHANGE UP (ref 0–0.2)
BASOPHILS NFR BLD AUTO: 1.7 % — SIGNIFICANT CHANGE UP (ref 0–2)
BASOPHILS NFR BLD AUTO: 1.7 % — SIGNIFICANT CHANGE UP (ref 0–2)
BILIRUB SERPL-MCNC: 0.4 MG/DL — SIGNIFICANT CHANGE UP (ref 0.2–1.2)
BILIRUB SERPL-MCNC: 0.4 MG/DL — SIGNIFICANT CHANGE UP (ref 0.2–1.2)
BUN SERPL-MCNC: 46 MG/DL — HIGH (ref 7–23)
BUN SERPL-MCNC: 46 MG/DL — HIGH (ref 7–23)
CALCIUM SERPL-MCNC: 8.5 MG/DL — SIGNIFICANT CHANGE UP (ref 8.4–10.5)
CALCIUM SERPL-MCNC: 8.5 MG/DL — SIGNIFICANT CHANGE UP (ref 8.4–10.5)
CHLORIDE SERPL-SCNC: 102 MMOL/L — SIGNIFICANT CHANGE UP (ref 96–108)
CHLORIDE SERPL-SCNC: 102 MMOL/L — SIGNIFICANT CHANGE UP (ref 96–108)
CO2 SERPL-SCNC: 23 MMOL/L — SIGNIFICANT CHANGE UP (ref 22–31)
CO2 SERPL-SCNC: 23 MMOL/L — SIGNIFICANT CHANGE UP (ref 22–31)
CREAT SERPL-MCNC: 3.76 MG/DL — HIGH (ref 0.5–1.3)
CREAT SERPL-MCNC: 3.76 MG/DL — HIGH (ref 0.5–1.3)
EGFR: 15 ML/MIN/1.73M2 — LOW
EGFR: 15 ML/MIN/1.73M2 — LOW
EOSINOPHIL # BLD AUTO: 0.04 K/UL — SIGNIFICANT CHANGE UP (ref 0–0.5)
EOSINOPHIL # BLD AUTO: 0.04 K/UL — SIGNIFICANT CHANGE UP (ref 0–0.5)
EOSINOPHIL NFR BLD AUTO: 3.5 % — SIGNIFICANT CHANGE UP (ref 0–6)
EOSINOPHIL NFR BLD AUTO: 3.5 % — SIGNIFICANT CHANGE UP (ref 0–6)
GIANT PLATELETS BLD QL SMEAR: PRESENT — SIGNIFICANT CHANGE UP
GIANT PLATELETS BLD QL SMEAR: PRESENT — SIGNIFICANT CHANGE UP
GLUCOSE SERPL-MCNC: 175 MG/DL — HIGH (ref 70–99)
GLUCOSE SERPL-MCNC: 175 MG/DL — HIGH (ref 70–99)
HCT VFR BLD CALC: 23.6 % — LOW (ref 39–50)
HCT VFR BLD CALC: 23.6 % — LOW (ref 39–50)
HGB BLD-MCNC: 8.3 G/DL — LOW (ref 13–17)
HGB BLD-MCNC: 8.3 G/DL — LOW (ref 13–17)
INR BLD: 1.17 RATIO — SIGNIFICANT CHANGE UP (ref 0.85–1.18)
INR BLD: 1.17 RATIO — SIGNIFICANT CHANGE UP (ref 0.85–1.18)
LYMPHOCYTES # BLD AUTO: 0.64 K/UL — LOW (ref 1–3.3)
LYMPHOCYTES # BLD AUTO: 0.64 K/UL — LOW (ref 1–3.3)
LYMPHOCYTES # BLD AUTO: 61.4 % — HIGH (ref 13–44)
LYMPHOCYTES # BLD AUTO: 61.4 % — HIGH (ref 13–44)
MAGNESIUM SERPL-MCNC: 2.4 MG/DL — SIGNIFICANT CHANGE UP (ref 1.6–2.6)
MAGNESIUM SERPL-MCNC: 2.4 MG/DL — SIGNIFICANT CHANGE UP (ref 1.6–2.6)
MANUAL SMEAR VERIFICATION: SIGNIFICANT CHANGE UP
MANUAL SMEAR VERIFICATION: SIGNIFICANT CHANGE UP
MCHC RBC-ENTMCNC: 35.2 GM/DL — SIGNIFICANT CHANGE UP (ref 32–36)
MCHC RBC-ENTMCNC: 35.2 GM/DL — SIGNIFICANT CHANGE UP (ref 32–36)
MCHC RBC-ENTMCNC: 37.4 PG — HIGH (ref 27–34)
MCHC RBC-ENTMCNC: 37.4 PG — HIGH (ref 27–34)
MCV RBC AUTO: 106.3 FL — HIGH (ref 80–100)
MCV RBC AUTO: 106.3 FL — HIGH (ref 80–100)
MONOCYTES # BLD AUTO: 0.02 K/UL — SIGNIFICANT CHANGE UP (ref 0–0.9)
MONOCYTES # BLD AUTO: 0.02 K/UL — SIGNIFICANT CHANGE UP (ref 0–0.9)
MONOCYTES NFR BLD AUTO: 1.8 % — LOW (ref 2–14)
MONOCYTES NFR BLD AUTO: 1.8 % — LOW (ref 2–14)
NEUTROPHILS # BLD AUTO: 0.33 K/UL — LOW (ref 1.8–7.4)
NEUTROPHILS # BLD AUTO: 0.33 K/UL — LOW (ref 1.8–7.4)
NEUTROPHILS NFR BLD AUTO: 30.7 % — LOW (ref 43–77)
NEUTROPHILS NFR BLD AUTO: 30.7 % — LOW (ref 43–77)
NEUTS BAND # BLD: 0.9 % — SIGNIFICANT CHANGE UP (ref 0–8)
NEUTS BAND # BLD: 0.9 % — SIGNIFICANT CHANGE UP (ref 0–8)
PHOSPHATE SERPL-MCNC: 4.7 MG/DL — HIGH (ref 2.5–4.5)
PHOSPHATE SERPL-MCNC: 4.7 MG/DL — HIGH (ref 2.5–4.5)
PLAT MORPH BLD: NORMAL — SIGNIFICANT CHANGE UP
PLAT MORPH BLD: NORMAL — SIGNIFICANT CHANGE UP
PLATELET # BLD AUTO: 127 K/UL — LOW (ref 150–400)
PLATELET # BLD AUTO: 127 K/UL — LOW (ref 150–400)
POTASSIUM SERPL-MCNC: 4.1 MMOL/L — SIGNIFICANT CHANGE UP (ref 3.5–5.3)
POTASSIUM SERPL-MCNC: 4.1 MMOL/L — SIGNIFICANT CHANGE UP (ref 3.5–5.3)
POTASSIUM SERPL-SCNC: 4.1 MMOL/L — SIGNIFICANT CHANGE UP (ref 3.5–5.3)
POTASSIUM SERPL-SCNC: 4.1 MMOL/L — SIGNIFICANT CHANGE UP (ref 3.5–5.3)
PROT SERPL-MCNC: 6.4 G/DL — SIGNIFICANT CHANGE UP (ref 6–8.3)
PROT SERPL-MCNC: 6.4 G/DL — SIGNIFICANT CHANGE UP (ref 6–8.3)
PROTHROM AB SERPL-ACNC: 12.8 SEC — SIGNIFICANT CHANGE UP (ref 9.5–13)
PROTHROM AB SERPL-ACNC: 12.8 SEC — SIGNIFICANT CHANGE UP (ref 9.5–13)
RBC # BLD: 2.22 M/UL — LOW (ref 4.2–5.8)
RBC # BLD: 2.22 M/UL — LOW (ref 4.2–5.8)
RBC # FLD: 23.8 % — HIGH (ref 10.3–14.5)
RBC # FLD: 23.8 % — HIGH (ref 10.3–14.5)
RBC BLD AUTO: SIGNIFICANT CHANGE UP
RBC BLD AUTO: SIGNIFICANT CHANGE UP
SODIUM SERPL-SCNC: 138 MMOL/L — SIGNIFICANT CHANGE UP (ref 135–145)
SODIUM SERPL-SCNC: 138 MMOL/L — SIGNIFICANT CHANGE UP (ref 135–145)
WBC # BLD: 1.05 K/UL — LOW (ref 3.8–10.5)
WBC # BLD: 1.05 K/UL — LOW (ref 3.8–10.5)
WBC # FLD AUTO: 1.05 K/UL — LOW (ref 3.8–10.5)
WBC # FLD AUTO: 1.05 K/UL — LOW (ref 3.8–10.5)

## 2023-11-23 PROCEDURE — 99233 SBSQ HOSP IP/OBS HIGH 50: CPT | Mod: GC

## 2023-11-23 PROCEDURE — 99232 SBSQ HOSP IP/OBS MODERATE 35: CPT | Mod: GC

## 2023-11-23 PROCEDURE — 93010 ELECTROCARDIOGRAM REPORT: CPT

## 2023-11-23 RX ORDER — MAGNESIUM SULFATE 500 MG/ML
1 VIAL (ML) INJECTION ONCE
Refills: 0 | Status: COMPLETED | OUTPATIENT
Start: 2023-11-23 | End: 2023-11-23

## 2023-11-23 RX ORDER — METOPROLOL TARTRATE 50 MG
150 TABLET ORAL DAILY
Refills: 0 | Status: DISCONTINUED | OUTPATIENT
Start: 2023-11-24 | End: 2023-11-28

## 2023-11-23 RX ORDER — GABAPENTIN 400 MG/1
300 CAPSULE ORAL DAILY
Refills: 0 | Status: DISCONTINUED | OUTPATIENT
Start: 2023-11-24 | End: 2023-11-27

## 2023-11-23 RX ORDER — METOPROLOL TARTRATE 50 MG
50 TABLET ORAL ONCE
Refills: 0 | Status: COMPLETED | OUTPATIENT
Start: 2023-11-23 | End: 2023-11-23

## 2023-11-23 RX ORDER — ATORVASTATIN CALCIUM 80 MG/1
80 TABLET, FILM COATED ORAL AT BEDTIME
Refills: 0 | Status: DISCONTINUED | OUTPATIENT
Start: 2023-11-23 | End: 2023-11-28

## 2023-11-23 RX ADMIN — GABAPENTIN 300 MILLIGRAM(S): 400 CAPSULE ORAL at 05:38

## 2023-11-23 RX ADMIN — Medication 100 GRAM(S): at 11:44

## 2023-11-23 RX ADMIN — CHLORHEXIDINE GLUCONATE 1 APPLICATION(S): 213 SOLUTION TOPICAL at 11:23

## 2023-11-23 RX ADMIN — CLOPIDOGREL BISULFATE 75 MILLIGRAM(S): 75 TABLET, FILM COATED ORAL at 11:24

## 2023-11-23 RX ADMIN — SERTRALINE 25 MILLIGRAM(S): 25 TABLET, FILM COATED ORAL at 11:24

## 2023-11-23 RX ADMIN — Medication 81 MILLIGRAM(S): at 11:23

## 2023-11-23 RX ADMIN — Medication 40 MILLIGRAM(S): at 05:38

## 2023-11-23 RX ADMIN — Medication 400 MILLIGRAM(S): at 05:38

## 2023-11-23 RX ADMIN — Medication 100 MILLIGRAM(S): at 09:09

## 2023-11-23 RX ADMIN — PANTOPRAZOLE SODIUM 40 MILLIGRAM(S): 20 TABLET, DELAYED RELEASE ORAL at 05:38

## 2023-11-23 RX ADMIN — Medication 50 MILLIGRAM(S): at 11:24

## 2023-11-23 RX ADMIN — ATORVASTATIN CALCIUM 80 MILLIGRAM(S): 80 TABLET, FILM COATED ORAL at 21:30

## 2023-11-23 NOTE — PATIENT PROFILE ADULT - FALL HARM RISK - HARM RISK INTERVENTIONS

## 2023-11-23 NOTE — PROGRESS NOTE ADULT - PROBLEM SELECTOR PLAN 2
Troponins elevated, downtrending  - Likely due to Type 2 MI from demand ischemia in the setting of hypoxia and reduced clearance in the setting of CKD  - ?atypical angina. 2 week history of heartburn and epigastric pain, not worsened by exertion Troponins elevated, downtrending  - Likely due to Type 2 MI from demand ischemia in the setting of hypoxia and reduced clearance in the setting of CKD  - r/o atypical angina given 2 week history of heartburn and epigastric pain, not worsened by exertion

## 2023-11-23 NOTE — PROGRESS NOTE ADULT - SUBJECTIVE AND OBJECTIVE BOX
PROGRESS NOTE:   Authored by Dr. Michelle Bravo MD (PGY-3). Pager [facility] [phone]/ [facility] [id number]    Patient is a 83y old  Male who presents with a chief complaint of acute hypoxic respiratory failure (23 Nov 2023 06:59)      SUBJECTIVE / OVERNIGHT EVENTS:  No acute events overnight.     MEDICATIONS  (STANDING):  acyclovir   Oral Tab/Cap 400 milliGRAM(s) Oral two times a day  aspirin enteric coated 81 milliGRAM(s) Oral daily  atorvastatin 80 milliGRAM(s) Oral at bedtime  chlorhexidine 2% Cloths 1 Application(s) Topical daily  clopidogrel Tablet 75 milliGRAM(s) Oral daily  fluconAZOLE   Tablet 200 milliGRAM(s) Oral daily  furosemide   Injectable 40 milliGRAM(s) IV Push daily  gabapentin 300 milliGRAM(s) Oral two times a day  metoprolol succinate  milliGRAM(s) Oral <User Schedule>  pantoprazole    Tablet 40 milliGRAM(s) Oral before breakfast  sertraline 25 milliGRAM(s) Oral daily    MEDICATIONS  (PRN):  aluminum hydroxide/magnesium hydroxide/simethicone Suspension 30 milliLiter(s) Oral every 6 hours PRN Dyspepsia  cyclobenzaprine 5 milliGRAM(s) Oral three times a day PRN Muscle Spasm      CAPILLARY BLOOD GLUCOSE        I&O's Summary    22 Nov 2023 07:01  -  23 Nov 2023 07:00  --------------------------------------------------------  IN: 0 mL / OUT: 1540 mL / NET: -1540 mL        PHYSICAL EXAM:  Vital Signs Last 24 Hrs  T(C): 36.6 (23 Nov 2023 04:28), Max: 37.2 (22 Nov 2023 22:09)  T(F): 97.8 (23 Nov 2023 04:28), Max: 98.9 (22 Nov 2023 22:09)  HR: 85 (23 Nov 2023 04:28) (70 - 115)  BP: 117/72 (23 Nov 2023 04:28) (113/66 - 136/80)  BP(mean): --  RR: 18 (23 Nov 2023 04:28) (18 - 19)  SpO2: 91% (23 Nov 2023 04:28) (91% - 99%)    Parameters below as of 23 Nov 2023 04:28  Patient On (Oxygen Delivery Method): nasal cannula  O2 Flow (L/min): 2      CONSTITUTIONAL: NAD  HEENT: MMM, EOMI, PERRLA  NECK: supple  RESPIRATORY: Normal respiratory effort; lungs are clear to auscultation bilaterally  CARDIOVASCULAR: Regular rate and rhythm, normal S1 and S2, no murmur/rub/gallop; No lower extremity edema  ABDOMEN: Nontender to palpation, normoactive bowel sounds, no rebound/guarding; No hepatosplenomegaly  MUSCULOSKELETAL: no clubbing or cyanosis of digits; no joint swelling or tenderness to palpation  NEURO: alert and oriented to person, place, and time. Moving all four extremities, sensation grossly intact  PSYCH: alert and oriented to person, place, and time; affect appropriate  SKIN: No rash    LABS:                        7.3    1.10  )-----------( 117      ( 22 Nov 2023 06:46 )             21.5     11-22    136  |  100  |  40<H>  ----------------------------<  134<H>  4.3   |  22  |  3.58<H>    Ca    8.7      22 Nov 2023 06:46  Phos  4.4     11-22  Mg     2.4     11-22    TPro  6.4  /  Alb  3.4  /  TBili  0.5  /  DBili  x   /  AST  16  /  ALT  9<L>  /  AlkPhos  94  11-22      CARDIAC MARKERS ( 22 Nov 2023 06:46 )  x     / x     / 45 U/L / x     / x          Urinalysis Basic - ( 22 Nov 2023 06:46 )    Color: x / Appearance: x / SG: x / pH: x  Gluc: 134 mg/dL / Ketone: x  / Bili: x / Urobili: x   Blood: x / Protein: x / Nitrite: x   Leuk Esterase: x / RBC: x / WBC x   Sq Epi: x / Non Sq Epi: x / Bacteria: x        Culture - Blood (collected 20 Nov 2023 19:50)  Source: .Blood Blood  Preliminary Report (23 Nov 2023 01:02):    No growth at 48 Hours    Culture - Blood (collected 20 Nov 2023 19:30)  Source: .Blood Blood  Preliminary Report (23 Nov 2023 01:02):    No growth at 48 Hours        Tele Reviewed:    RADIOLOGY & ADDITIONAL TESTS:  Results Reviewed:   Imaging Personally Reviewed:  Electrocardiogram Personally Reviewed:     PROGRESS NOTE:   Authored by Dr. Michelle Bravo MD (PGY-3). Pager [facility] [phone]/ [facility] [id number]    Patient is a 83y old  Male who presents with a chief complaint of acute hypoxic respiratory failure (23 Nov 2023 06:59)      SUBJECTIVE / OVERNIGHT EVENTS:  No acute events overnight. 30 beats of Vtach this AM.     Patient complaining of fatigue. Denies chest pain, epigastric pain, SOB, palpitations. Has not gotten out of bed    MEDICATIONS  (STANDING):  acyclovir   Oral Tab/Cap 400 milliGRAM(s) Oral two times a day  aspirin enteric coated 81 milliGRAM(s) Oral daily  atorvastatin 80 milliGRAM(s) Oral at bedtime  chlorhexidine 2% Cloths 1 Application(s) Topical daily  clopidogrel Tablet 75 milliGRAM(s) Oral daily  fluconAZOLE   Tablet 200 milliGRAM(s) Oral daily  furosemide   Injectable 40 milliGRAM(s) IV Push daily  gabapentin 300 milliGRAM(s) Oral two times a day  metoprolol succinate  milliGRAM(s) Oral <User Schedule>  pantoprazole    Tablet 40 milliGRAM(s) Oral before breakfast  sertraline 25 milliGRAM(s) Oral daily    MEDICATIONS  (PRN):  aluminum hydroxide/magnesium hydroxide/simethicone Suspension 30 milliLiter(s) Oral every 6 hours PRN Dyspepsia  cyclobenzaprine 5 milliGRAM(s) Oral three times a day PRN Muscle Spasm      CAPILLARY BLOOD GLUCOSE        I&O's Summary    22 Nov 2023 07:01  -  23 Nov 2023 07:00  --------------------------------------------------------  IN: 0 mL / OUT: 1540 mL / NET: -1540 mL        PHYSICAL EXAM:  Vital Signs Last 24 Hrs  T(C): 36.6 (23 Nov 2023 04:28), Max: 37.2 (22 Nov 2023 22:09)  T(F): 97.8 (23 Nov 2023 04:28), Max: 98.9 (22 Nov 2023 22:09)  HR: 85 (23 Nov 2023 04:28) (70 - 115)  BP: 117/72 (23 Nov 2023 04:28) (113/66 - 136/80)  BP(mean): --  RR: 18 (23 Nov 2023 04:28) (18 - 19)  SpO2: 91% (23 Nov 2023 04:28) (91% - 99%)    Parameters below as of 23 Nov 2023 04:28  Patient On (Oxygen Delivery Method): nasal cannula  O2 Flow (L/min): 2      CONSTITUTIONAL: NAD  HEENT: MMM, EOMI, PERRLA  NECK: supple, no JVD  RESPIRATORY: Normal respiratory effort; lungs are clear to auscultation bilaterally  CARDIOVASCULAR: Regular rate and rhythm, normal S1 and S2, no murmur/rub/gallop; systolic murmur 3/5 best heard at RUSB. No LE edema  ABDOMEN: Nontender to palpation, normoactive bowel sounds, no rebound/guarding; No hepatosplenomegaly  MUSCULOSKELETAL: no clubbing or cyanosis of digits; no joint swelling or tenderness to palpation  NEURO: alert and oriented to person, place, and time. Moving all four extremities, sensation grossly intact  PSYCH: alert and oriented to person, place, and time; affect appropriate  SKIN: No rash    LABS:                        7.3    1.10  )-----------( 117      ( 22 Nov 2023 06:46 )             21.5     11-22    136  |  100  |  40<H>  ----------------------------<  134<H>  4.3   |  22  |  3.58<H>    Ca    8.7      22 Nov 2023 06:46  Phos  4.4     11-22  Mg     2.4     11-22    TPro  6.4  /  Alb  3.4  /  TBili  0.5  /  DBili  x   /  AST  16  /  ALT  9<L>  /  AlkPhos  94  11-22      CARDIAC MARKERS ( 22 Nov 2023 06:46 )  x     / x     / 45 U/L / x     / x          Urinalysis Basic - ( 22 Nov 2023 06:46 )    Color: x / Appearance: x / SG: x / pH: x  Gluc: 134 mg/dL / Ketone: x  / Bili: x / Urobili: x   Blood: x / Protein: x / Nitrite: x   Leuk Esterase: x / RBC: x / WBC x   Sq Epi: x / Non Sq Epi: x / Bacteria: x        Culture - Blood (collected 20 Nov 2023 19:50)  Source: .Blood Blood  Preliminary Report (23 Nov 2023 01:02):    No growth at 48 Hours    Culture - Blood (collected 20 Nov 2023 19:30)  Source: .Blood Blood  Preliminary Report (23 Nov 2023 01:02):    No growth at 48 Hours        Tele Reviewed:    RADIOLOGY & ADDITIONAL TESTS:  Results Reviewed:   Imaging Personally Reviewed:  Electrocardiogram Personally Reviewed:

## 2023-11-23 NOTE — PROGRESS NOTE ADULT - PROBLEM SELECTOR PLAN 6
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 Cabral (private nephro)  - FeUrea 58.3%, c/w intrinsic kidney disease    Plan:  > continue to monitor, expect improvement with continued diuresis  > avoid nephrotoxic meds  > hold lasix  > f/u kidney and bladder US

## 2023-11-23 NOTE — PROGRESS NOTE ADULT - SUBJECTIVE AND OBJECTIVE BOX
Subjective    Patient seen and examined at bedside.  No chest pain, shortness of breath, or palpitations            Telemetry review:     Current Meds:  acyclovir   Oral Tab/Cap 400 milliGRAM(s) Oral two times a day  aluminum hydroxide/magnesium hydroxide/simethicone Suspension 30 milliLiter(s) Oral every 6 hours PRN  aspirin enteric coated 81 milliGRAM(s) Oral daily  atorvastatin 40 milliGRAM(s) Oral at bedtime  chlorhexidine 2% Cloths 1 Application(s) Topical daily  clopidogrel Tablet 75 milliGRAM(s) Oral daily  cyclobenzaprine 5 milliGRAM(s) Oral three times a day PRN  fluconAZOLE   Tablet 200 milliGRAM(s) Oral daily  furosemide   Injectable 40 milliGRAM(s) IV Push daily  gabapentin 300 milliGRAM(s) Oral two times a day  metoprolol succinate  milliGRAM(s) Oral <User Schedule>  pantoprazole    Tablet 40 milliGRAM(s) Oral before breakfast  sertraline 25 milliGRAM(s) Oral daily      Vitals:  T(F): 97.8 (11-23), Max: 98.9 (11-22)  HR: 85 (11-23) (70 - 115)  BP: 117/72 (11-23) (113/66 - 136/80)  RR: 18 (11-23)  SpO2: 91% (11-23)  I&O's Summary    22 Nov 2023 07:01  -  23 Nov 2023 07:00  --------------------------------------------------------  IN: 0 mL / OUT: 1540 mL / NET: -1540 mL        Physical Exam:  General: No acute distress; well appearing  Eyes: PERRL, EOMI, pink conjunctiva  HEENT: Normal oral mucosa  Cardiovascular: RRR, S1, S2, no murmurs, rubs, or gallops; no edema; no JVD  Respiratory: Clear to auscultation bilaterally, speaking full sentences  Gastrointestinal: soft, non-tender, non-distended with normal bowel sounds  Extremities: 2+ pulses, no clubbing; no joint deformity, or edema  Neurologic: AAOx3,  Non-focal  Skin: No rashes, ecchymoses, or cyanosis                          7.3    1.10  )-----------( 117      ( 22 Nov 2023 06:46 )             21.5     11-22    136  |  100  |  40<H>  ----------------------------<  134<H>  4.3   |  22  |  3.58<H>    Ca    8.7      22 Nov 2023 06:46  Phos  4.4     11-22  Mg     2.4     11-22    TPro  6.4  /  Alb  3.4  /  TBili  0.5  /  DBili  x   /  AST  16  /  ALT  9<L>  /  AlkPhos  94  11-22      CARDIAC MARKERS ( 22 Nov 2023 06:46 )  x     / x     / x     / 45 U/L / x     / x      CARDIAC MARKERS ( 20 Nov 2023 20:24 )  145 ng/L / x     / x     / x     / x     / x      CARDIAC MARKERS ( 20 Nov 2023 18:51 )  154 ng/L / x     / x     / x     / x     / x                   Subjective    Patient seen and examined at bedside.  No chest pain, shortness of breath, or palpitations            Telemetry review: Sinus rhythm with PACs and occasional PVCs    Current Meds:  acyclovir   Oral Tab/Cap 400 milliGRAM(s) Oral two times a day  aluminum hydroxide/magnesium hydroxide/simethicone Suspension 30 milliLiter(s) Oral every 6 hours PRN  aspirin enteric coated 81 milliGRAM(s) Oral daily  atorvastatin 40 milliGRAM(s) Oral at bedtime  chlorhexidine 2% Cloths 1 Application(s) Topical daily  clopidogrel Tablet 75 milliGRAM(s) Oral daily  cyclobenzaprine 5 milliGRAM(s) Oral three times a day PRN  fluconAZOLE   Tablet 200 milliGRAM(s) Oral daily  furosemide   Injectable 40 milliGRAM(s) IV Push daily  gabapentin 300 milliGRAM(s) Oral two times a day  metoprolol succinate  milliGRAM(s) Oral <User Schedule>  pantoprazole    Tablet 40 milliGRAM(s) Oral before breakfast  sertraline 25 milliGRAM(s) Oral daily    Vitals:  T(F): 97.8 (11-23), Max: 98.9 (11-22)  HR: 85 (11-23) (70 - 115)  BP: 117/72 (11-23) (113/66 - 136/80)  RR: 18 (11-23)  SpO2: 91% (11-23)  I&O's Summary    22 Nov 2023 07:01  -  23 Nov 2023 07:00  --------------------------------------------------------  IN: 0 mL / OUT: 1540 mL / NET: -1540 mL    Physical Exam:  General: No acute distress; well appearing  Cardiovascular: RRR, S1, S2, no murmurs, rubs, or gallops; no edema; no JVD  Respiratory: Speaking full sentences, very mild bibasila crackles  Gastrointestinal: soft, non-tender, non-distended with normal bowel sounds  Extremities: 2+ pulses, no clubbing; no joint deformity, or edema  Neurologic: AAOx3,  Non-focal  Skin: No rashes, ecchymoses, or cyanosis                        7.3    1.10  )-----------( 117      ( 22 Nov 2023 06:46 )             21.5     11-22    136  |  100  |  40<H>  ----------------------------<  134<H>  4.3   |  22  |  3.58<H>    Ca    8.7      22 Nov 2023 06:46  Phos  4.4     11-22  Mg     2.4     11-22    TPro  6.4  /  Alb  3.4  /  TBili  0.5  /  DBili  x   /  AST  16  /  ALT  9<L>  /  AlkPhos  94  11-22    CARDIAC MARKERS ( 22 Nov 2023 06:46 )  x     / x     / x     / 45 U/L / x     / x      CARDIAC MARKERS ( 20 Nov 2023 20:24 )  145 ng/L / x     / x     / x     / x     / x      CARDIAC MARKERS ( 20 Nov 2023 18:51 )  154 ng/L / x     / x     / x     / x     / x        TTE 11/21  CONCLUSIONS:      1. Technically difficult image quality.   2. Left ventricular systolic function is moderately decreased with an ejection fraction of 40 % by Andersen's method of disks.   3. Basal and mid anterolateral wall, basal and mid inferior wall, basal and mid inferolateral wall, and basal anteroseptal segment are abnormal.   4. There is moderate (grade 2) left ventricular diastolic dysfunction, with elevated filling pressure.   5. Normal right ventricular cavity size and systolic function.   6. There is moderate aortic stenosis. The peak transaortic velocity is 3.00 m/s, peak transaortic gradient is 36.0 mmHg and mean transaortic gradient is 23.0 mmHg with an LVOT/aortic valve VTI ratio of 0.34. The aortic valve area is estimated at 1.06 cm² by the continuity equation. There is no evidence of aortic regurgitation.

## 2023-11-23 NOTE — PROGRESS NOTE ADULT - PROBLEM SELECTOR PLAN 5
Patient with known atrial tachycardia, diagnosed during admission 1/2023   - patient followed by Dr. Reddy for cardiology, and Dr. Feldman for EP  - recently had event monitor outpatient revealing significant runs of supraventricular tachycardia, though asymptomatic during these events  - restarted on BB recently by EP    Plan:  > increase metoprolol succinate to 100 mg qD Patient with known atrial tachycardia, diagnosed during admission 1/2023   - patient followed by Dr. Reddy for cardiology, and Dr. Feldman for EP  - recently had event monitor outpatient revealing significant runs of supraventricular tachycardia, though asymptomatic during these events  - restarted on BB recently by EP    Plan:  - increase metoprolol succinate to 150 mg qD  - EKG today: frequent PACs

## 2023-11-23 NOTE — PROGRESS NOTE ADULT - PROBLEM SELECTOR PLAN 1
Patient presented with incidental, asymptomatic hypoxemia at hematology clinic, down to 88% on room air  - in the ED, patient noted to be persistently hypoxemic to 88% on 5L NC with tachypnea, requiring BIPAP  - CT chest without PE, small bilateral pleural effusions, mild pulmonary edema  - ProBNP elevated to 6700, higher in previous admission  - had episode of epigastric "lower chest" pain in ED with associated diaphoresis and respiratory distress, may be atypical presentation of cardiac ischemic pain, but since resolved  - TTE 1/2023: moderate AS, normal RV/LV function    Plan:  > continue with lasix   > TTE 11/21 EF 40%, moderate LV diastolic dysfunction, basal and mid anterolateral wall, basal and mid inferior wall, basal and mid inferolateral wall, and basal anteroseptal segment are abnormal, moderate AS  > cards consulted for newly-reduced EF with new wall motional abnl and atypical angina  > increase home metoprolol to 100 mg ER qD given significant ectopy and atrial tachycardia  > incentive spirometer given atelectasis on CT  > wean off supplemental O2 as tolerated Patient presented with incidental, asymptomatic hypoxemia at hematology clinic, down to 88% on room air  - in the ED, patient noted to be persistently hypoxemic to 88% on 5L NC with tachypnea, requiring BIPAP  - CT chest without PE, small bilateral pleural effusions, mild pulmonary edema  - ProBNP elevated to 6700, higher in previous admission  - had episode of epigastric "lower chest" pain in ED with associated diaphoresis and respiratory distress, may be atypical presentation of cardiac ischemic pain, but since resolved  - TTE 1/2023: moderate AS, normal RV/LV function    Plan:  > hold lasix due to JUAN RAMON. Pt euvolemic  > TTE 11/21 EF 40%, moderate LV diastolic dysfunction, basal and mid anterolateral wall, basal and mid inferior wall, basal and mid inferolateral wall, and basal anteroseptal segment are abnormal, moderate AS  > cards consulted for ischemic eval for newly-reduced EF with new wall motional abnl and atypical angina  > increase home metoprolol to 150 mg ER qD given significant ectopy (PAC, PVCs, Vtach) on tele  > incentive spirometer given atelectasis on CT  > wean off supplemental O2 as tolerated

## 2023-11-23 NOTE — PROGRESS NOTE ADULT - ASSESSMENT
84 y/o M with PMH of gout, myelodysplastic syndrome, coronary artery disease, carotid artery stenosis, hypertension, hyperlipidemia, chronic kidney disease stage III, renal artery stenosis (s/p left renal artery stent), spinal stenosis, and atrial tachycardia initially presenting with AHRF, concern for volume overload with episodic lower chest pain. Cardiology consulted for new HFrEF with WMA.      #HFrEF likely 2/2 known CAD  Previous TTE 1/2023: moderate AS, normal RV/LV function  TTE 11/2023: LVEF 40% - basal and mid anterolateral, basal and mid inferior, basal and mid inferolateral, and basal anteroseptal WMAs noted. Grade II diastolic dysfunction. Moderate AS, KEENA 1.06 cm.  pBNP 6702, trop 154 -> 145  C 3/18/2021: pRCA 100% , mLAD 60%, OM1 60% - medical management, PCI of  deferred at that time   84 y/o M with PMH of gout, myelodysplastic syndrome, coronary artery disease, carotid artery stenosis, hypertension, hyperlipidemia, chronic kidney disease stage III, renal artery stenosis (s/p left renal artery stent), spinal stenosis, and atrial tachycardia initially presenting with AHRF, concern for volume overload with episodic lower chest pain. Cardiology consulted for new HFrEF with WMA.      #HFrEF likely 2/2 known CAD  Previous TTE 1/2023: moderate AS, normal RV/LV function  TTE 11/2023: LVEF 40% - basal and mid anterolateral, basal and mid inferior, basal and mid inferolateral, and basal anteroseptal WMAs noted. Grade II diastolic dysfunction. Moderate AS, KEENA 1.06 cm.  pBNP 6702, trop 154 -> 145  LHC 3/18/2021: pRCA 100% , mLAD 60%, OM1 60% - medical management, PCI of  deferred at that time    - Asprin 81mg daily  - Plavix 75mg daily  - Toprolol 100mg QD  - Appears euvolemic can transition to PO lasix 40mg daily starting 11/24  - Replete K > 4, Mg > 2  - Daily standing weights, strict I/Os  - Holding off further ischemic work up pending improvement in renal function    Recommendations are preliminary until attending attestation.    Samantha Smiley MD  Cardiology Fellow

## 2023-11-23 NOTE — PROGRESS NOTE ADULT - PROBLEM SELECTOR PLAN 4
RESOLVED   Patient found to have prolonged QTc to 505  - may be falsely elevated in the setting of tachycardia with significant ectopic beats  - on fluconazole at home    Plan:  > QTc 470 today. resume home fluconazole for fungal ppx  > Optimize K, Mg, Ca  > avoid QTc prolonging meds Patient found to have prolonged QTc to 505  - may be falsely elevated in the setting of tachycardia with significant ectopic beats  - on fluconazole at home    Plan:  > QTc 517 today. hold home fluconazole  > Optimize K, Mg, Ca  > avoid QTc prolonging meds

## 2023-11-23 NOTE — PROGRESS NOTE ADULT - PROBLEM SELECTOR PLAN 3
Patient follows with Dr. Zita Mcmahon at [facility]  - was previously on lenolidamide but held given pancytopenia  - patient currently pancytopenic. but appears to be near baseline labs per outpt review  - receiving procrit injections with improvement in pancytopenia    Plan:  > continue with acyclovir for ppx  > fluconazole ppx  > hematology on board, appreciate recs  > maintain Hgb>8 given history of CAD Patient follows with Dr. Zita Mcmahon at [facility]  - was previously on lenolidamide but held given pancytopenia. Last chemo on 10/30 with decitabine   - patient currently pancytopenic. but appears to be near baseline labs per outpt review  - receiving procrit injections with improvement in pancytopenia    Plan:  > continue with acyclovir for ppx  > fluconazole ppx  > hematology on board, appreciate recs  > maintain Hgb>8 given history of CAD. s/p 1 unit of pRBC

## 2023-11-23 NOTE — PATIENT PROFILE ADULT - FUNCTIONAL ASSESSMENT - BASIC MOBILITY 6.
2-calculated by average/Not able to assess (calculate score using [facility] averaging method)

## 2023-11-23 NOTE — PROVIDER CONTACT NOTE (OTHER) - ACTION/TREATMENT ORDERED:
T6 Michelle Bravo notified. Do ECG.
Monitor and notify if any changes
provider notified, no interventions at this time, pt previously had 30 beats wct. will continue to monitor

## 2023-11-23 NOTE — PROVIDER CONTACT NOTE (OTHER) - ASSESSMENT
Pt A&Ox4, able to make needs known. Pt asymptomatic. VSS. No s/s of bleeding. Pt denies cp, denies SOB, denies pain.
Pt is A&Ox4. Vitals are stable, but pulse was slightly high, 123.
Pt A&Ox4, able to make needs known. Pt asymptomatic. VSS. No s/s of bleeding. Pt denies cp, denies SOB, denies pain.

## 2023-11-23 NOTE — PROGRESS NOTE ADULT - PROBLEM SELECTOR PLAN 7
Patient with symptoms suggestive of claudication  - blisters may be sequelae of PAD  - severe functional limitation    Plan:  > hold home cilostazol due to    > follow up CHER/PVR Patient with symptoms suggestive of claudication  - blisters may be sequelae of PAD  - severe functional limitation    Plan:  > hold home cilostazol due to ADHF  > follow up CHER/PVR

## 2023-11-23 NOTE — PROVIDER CONTACT NOTE (OTHER) - BACKGROUND
Patient is an 83 year old M with gout, myelodysplastic syndrome, coronary artery disease, carotid artery stenosis, hypertension, hyperlipidemia, chronic kidney disease stage III
DX: acute respiratory failure with hypoxia
PT came in from hematology office diagnosed with acute resp failure w/ hypoxia. 82% on RA. BiPap was put on then 6LNC.

## 2023-11-23 NOTE — PROGRESS NOTE ADULT - SUBJECTIVE AND OBJECTIVE BOX
No pain, no sob. doing fine today. decent UOP.       VITAL:  T(C): , Max: 36.5 (11-21-23 @ 19:48)  T(F): , Max: 97.7 (11-21-23 @ 19:48)  HR: 86 (11-22-23 @ 05:07)  BP: 115/61 (11-22-23 @ 05:07)  BP(mean): --  RR: 18 (11-22-23 @ 05:07)  SpO2: 95% (11-22-23 @ 05:07)  Urine output 4050cc/24h      PHYSICAL EXAM:  Constitutional: NAD at rest on NCO2  HEENT: NCAT, DMM  Neck: Supple, No JVD  Respiratory: (+)bibasilar rales  Cardiovascular: reg s1s2, (+)1/6 LESA  Gastrointestinal: BS+, soft, NT/ND  Extremities: No peripheral edema b/l  Neurological: reduced generalized strength  Back: no CVAT b/l  Skin: (+)erythema b/l shins    LABS:                        7.3    1.10  )-----------( 117      ( 22 Nov 2023 06:46 )             21.5     Na(136)/K(4.3)/Cl(100)/HCO3(22)/BUN(40)/Cr(3.58)Glu(134)/Ca(8.7)/Mg(2.4)/PO4(4.4)    11-22 @ 06:46  Na(138)/K(4.5)/Cl(103)/HCO3(19)/BUN(35)/Cr(2.84)Glu(123)/Ca(8.8)/Mg(2.4)/PO4(4.3)    11-21 @ 07:25  Na(137)/K(4.6)/Cl(105)/HCO3(21)/BUN(35)/Cr(2.64)Glu(131)/Ca(9.0)/Mg(--)/PO4(--)    11-20 @ 20:24  Na(137)/K(4.8)/Cl(104)/HCO3(20)/BUN(36)/Cr(2.62)Glu(114)/Ca(9.0)/Mg(--)/PO4(--)    11-20 @ 18:51    CPK 45  Uric 7.8    Sodium, Random Urine: 80 mmol/L (11-21 @ 22:30)  Potassium, Random Urine: 37 mmol/L (11-21 @ 22:30)  Chloride, Random Urine: 94 mmol/L (11-21 @ 22:30)  Creatinine, Random Urine: 39 mg/dL (11-21 @ 22:30)    IMAGING:  < from: TTE W or WO Ultrasound Enhancing Agent (11.21.23 @ 09:58) >   1. Technically difficult image quality.   2. Left ventricular systolic function is moderately decreased with an ejection fraction of 40 % by Andersen's method of disks.   3. Basal and mid anterolateral wall, basal and mid inferior wall, basal and mid inferolateral wall, and basal anteroseptal segment are abnormal.   4. There is moderate (grade 2) left ventricular diastolic dysfunction, with elevated filling pressure.   5. Normal right ventricular cavity size and systolic function.   6. There is moderate aortic stenosis. The peak transaortic velocity is 3.00 m/s, peak transaortic gradient is 36.0 mmHg and mean transaortic gradient is 23.0 mmHg with an LVOT/aortic valve VTI ratio of 0.34. The aortic valve area is estimated at 1.06 cm² by the continuity equation. There is no evidence of aortic regurgitation.              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 He had a CT with IV contrast on admission; whereas this could not have accounted for the initial rise in creatinine, it highly likely accounts for the further rise in creatinine from today (as well as the granular casts on UA). still awaiting recovery from ATN/CA-JUAN RAMON> consider holding diuresis x1-2 days while RF attempts to recover    (3)Metabolic acidosis - improving, with diuresis    (4)Pancytopenia - MDS    (5)Hypoxia - cardiogenic pulmonary edema, it appears. Improving, with diuresis    (6)ID - on empiric Cefepime - is this needed here? recommend dose reduction       RECOMMEND:  (1)If Cefepime is to be continued, then would reduce dose to 500mg iv q24h, eGFR is 15  (2) Lasix to 40mg iv q24h  (3)Gabapentin to 300mg po q24  (4)Continue strict I/Os  (5)BMP+Mg+PO4 daily  (6)No HD for now, conservative management for now in context of ATN and CA-JUAN RAMON.     Anibal Saini DO  [facility]  Office/on call physician: [phone]       No pain, no sob. doing fine today. decent UOP.       VITAL:  T(C): , Max: 36.5 (11-21-23 @ 19:48)  T(F): , Max: 97.7 (11-21-23 @ 19:48)  HR: 86 (11-22-23 @ 05:07)  BP: 115/61 (11-22-23 @ 05:07)  BP(mean): --  RR: 18 (11-22-23 @ 05:07)  SpO2: 95% (11-22-23 @ 05:07)  Urine output 4050cc/24h      PHYSICAL EXAM:  Constitutional: NAD at rest on NCO2  HEENT: NCAT, DMM  Neck: Supple, No JVD  Respiratory: (+)bibasilar rales  Cardiovascular: reg s1s2, (+)1/6 LESA  Gastrointestinal: BS+, soft, NT/ND  Extremities: No peripheral edema b/l  Neurological: reduced generalized strength  Back: no CVAT b/l  Skin: (+)erythema b/l shins    LABS:                        7.3    1.10  )-----------( 117      ( 22 Nov 2023 06:46 )             21.5     Na(136)/K(4.3)/Cl(100)/HCO3(22)/BUN(40)/Cr(3.58)Glu(134)/Ca(8.7)/Mg(2.4)/PO4(4.4)    11-22 @ 06:46  Na(138)/K(4.5)/Cl(103)/HCO3(19)/BUN(35)/Cr(2.84)Glu(123)/Ca(8.8)/Mg(2.4)/PO4(4.3)    11-21 @ 07:25  Na(137)/K(4.6)/Cl(105)/HCO3(21)/BUN(35)/Cr(2.64)Glu(131)/Ca(9.0)/Mg(--)/PO4(--)    11-20 @ 20:24  Na(137)/K(4.8)/Cl(104)/HCO3(20)/BUN(36)/Cr(2.62)Glu(114)/Ca(9.0)/Mg(--)/PO4(--)    11-20 @ 18:51    CPK 45  Uric 7.8    Sodium, Random Urine: 80 mmol/L (11-21 @ 22:30)  Potassium, Random Urine: 37 mmol/L (11-21 @ 22:30)  Chloride, Random Urine: 94 mmol/L (11-21 @ 22:30)  Creatinine, Random Urine: 39 mg/dL (11-21 @ 22:30)    IMAGING:  < from: TTE W or WO Ultrasound Enhancing Agent (11.21.23 @ 09:58) >   1. Technically difficult image quality.   2. Left ventricular systolic function is moderately decreased with an ejection fraction of 40 % by Andersen's method of disks.   3. Basal and mid anterolateral wall, basal and mid inferior wall, basal and mid inferolateral wall, and basal anteroseptal segment are abnormal.   4. There is moderate (grade 2) left ventricular diastolic dysfunction, with elevated filling pressure.   5. Normal right ventricular cavity size and systolic function.   6. There is moderate aortic stenosis. The peak transaortic velocity is 3.00 m/s, peak transaortic gradient is 36.0 mmHg and mean transaortic gradient is 23.0 mmHg with an LVOT/aortic valve VTI ratio of 0.34. The aortic valve area is estimated at 1.06 cm² by the continuity equation. There is no evidence of aortic regurgitation.              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 He had a CT with IV contrast on admission; whereas this could not have accounted for the initial rise in creatinine, it highly likely accounts for the further rise in creatinine from today (as well as the granular casts on UA). still awaiting recovery from ATN/CA-JUAN RAMON> consider holding diuresis x1-2 days while RF attempts to recover    (3)Metabolic acidosis - improving, with diuresis    (4)Pancytopenia - MDS    (5)Hypoxia - cardiogenic pulmonary edema, it appears. Improving, with diuresis    (6)ID - on empiric Cefepime - is this needed here? recommend dose reduction       RECOMMEND:  (1)If Cefepime is to be continued, then would reduce dose to 500mg iv q24h, eGFR is 15  (2) would like to hold diuresis if possible for now.   (3)Gabapentin to 300mg po q24  (4)Continue strict I/Os  (5)BMP+Mg+PO4 daily  (6)No HD for now, conservative management for now in context of ATN and CA-JUAN RAMON.     Anibal Saini DO  [facility]  Office/on call physician: [phone]

## 2023-11-24 ENCOUNTER — APPOINTMENT (OUTPATIENT)
Dept: HEMATOLOGY ONCOLOGY | Facility: CLINIC | Age: 83
End: 2023-11-24

## 2023-11-24 ENCOUNTER — APPOINTMENT (OUTPATIENT)
Dept: INFUSION THERAPY | Facility: HOSPITAL | Age: 83
End: 2023-11-24

## 2023-11-24 LAB
ALBUMIN SERPL ELPH-MCNC: 3.6 G/DL — SIGNIFICANT CHANGE UP (ref 3.3–5)
ALBUMIN SERPL ELPH-MCNC: 3.6 G/DL — SIGNIFICANT CHANGE UP (ref 3.3–5)
ALP SERPL-CCNC: 86 U/L — SIGNIFICANT CHANGE UP (ref 40–120)
ALP SERPL-CCNC: 86 U/L — SIGNIFICANT CHANGE UP (ref 40–120)
ALT FLD-CCNC: 8 U/L — LOW (ref 10–45)
ALT FLD-CCNC: 8 U/L — LOW (ref 10–45)
ANION GAP SERPL CALC-SCNC: 13 MMOL/L — SIGNIFICANT CHANGE UP (ref 5–17)
ANION GAP SERPL CALC-SCNC: 13 MMOL/L — SIGNIFICANT CHANGE UP (ref 5–17)
ANISOCYTOSIS BLD QL: SLIGHT — SIGNIFICANT CHANGE UP
ANISOCYTOSIS BLD QL: SLIGHT — SIGNIFICANT CHANGE UP
AST SERPL-CCNC: 14 U/L — SIGNIFICANT CHANGE UP (ref 10–40)
AST SERPL-CCNC: 14 U/L — SIGNIFICANT CHANGE UP (ref 10–40)
BASOPHILS # BLD AUTO: 0.02 K/UL — SIGNIFICANT CHANGE UP (ref 0–0.2)
BASOPHILS # BLD AUTO: 0.02 K/UL — SIGNIFICANT CHANGE UP (ref 0–0.2)
BASOPHILS NFR BLD AUTO: 2 % — SIGNIFICANT CHANGE UP (ref 0–2)
BASOPHILS NFR BLD AUTO: 2 % — SIGNIFICANT CHANGE UP (ref 0–2)
BILIRUB SERPL-MCNC: 0.4 MG/DL — SIGNIFICANT CHANGE UP (ref 0.2–1.2)
BILIRUB SERPL-MCNC: 0.4 MG/DL — SIGNIFICANT CHANGE UP (ref 0.2–1.2)
BUN SERPL-MCNC: 47 MG/DL — HIGH (ref 7–23)
BUN SERPL-MCNC: 47 MG/DL — HIGH (ref 7–23)
CALCIUM SERPL-MCNC: 8.4 MG/DL — SIGNIFICANT CHANGE UP (ref 8.4–10.5)
CALCIUM SERPL-MCNC: 8.4 MG/DL — SIGNIFICANT CHANGE UP (ref 8.4–10.5)
CHLORIDE SERPL-SCNC: 104 MMOL/L — SIGNIFICANT CHANGE UP (ref 96–108)
CHLORIDE SERPL-SCNC: 104 MMOL/L — SIGNIFICANT CHANGE UP (ref 96–108)
CO2 SERPL-SCNC: 22 MMOL/L — SIGNIFICANT CHANGE UP (ref 22–31)
CO2 SERPL-SCNC: 22 MMOL/L — SIGNIFICANT CHANGE UP (ref 22–31)
CREAT SERPL-MCNC: 3.52 MG/DL — HIGH (ref 0.5–1.3)
CREAT SERPL-MCNC: 3.52 MG/DL — HIGH (ref 0.5–1.3)
DACRYOCYTES BLD QL SMEAR: SLIGHT — SIGNIFICANT CHANGE UP
DACRYOCYTES BLD QL SMEAR: SLIGHT — SIGNIFICANT CHANGE UP
EGFR: 17 ML/MIN/1.73M2 — LOW
EGFR: 17 ML/MIN/1.73M2 — LOW
EOSINOPHIL # BLD AUTO: 0.05 K/UL — SIGNIFICANT CHANGE UP (ref 0–0.5)
EOSINOPHIL # BLD AUTO: 0.05 K/UL — SIGNIFICANT CHANGE UP (ref 0–0.5)
EOSINOPHIL NFR BLD AUTO: 5.1 % — SIGNIFICANT CHANGE UP (ref 0–6)
EOSINOPHIL NFR BLD AUTO: 5.1 % — SIGNIFICANT CHANGE UP (ref 0–6)
GLUCOSE SERPL-MCNC: 133 MG/DL — HIGH (ref 70–99)
GLUCOSE SERPL-MCNC: 133 MG/DL — HIGH (ref 70–99)
HCT VFR BLD CALC: 24 % — LOW (ref 39–50)
HCT VFR BLD CALC: 24 % — LOW (ref 39–50)
HGB BLD-MCNC: 8.2 G/DL — LOW (ref 13–17)
HGB BLD-MCNC: 8.2 G/DL — LOW (ref 13–17)
LYMPHOCYTES # BLD AUTO: 0.64 K/UL — LOW (ref 1–3.3)
LYMPHOCYTES # BLD AUTO: 0.64 K/UL — LOW (ref 1–3.3)
LYMPHOCYTES # BLD AUTO: 62.6 % — HIGH (ref 13–44)
LYMPHOCYTES # BLD AUTO: 62.6 % — HIGH (ref 13–44)
MACROCYTES BLD QL: SLIGHT — SIGNIFICANT CHANGE UP
MACROCYTES BLD QL: SLIGHT — SIGNIFICANT CHANGE UP
MAGNESIUM SERPL-MCNC: 2.7 MG/DL — HIGH (ref 1.6–2.6)
MAGNESIUM SERPL-MCNC: 2.7 MG/DL — HIGH (ref 1.6–2.6)
MANUAL SMEAR VERIFICATION: SIGNIFICANT CHANGE UP
MANUAL SMEAR VERIFICATION: SIGNIFICANT CHANGE UP
MCHC RBC-ENTMCNC: 34.2 GM/DL — SIGNIFICANT CHANGE UP (ref 32–36)
MCHC RBC-ENTMCNC: 34.2 GM/DL — SIGNIFICANT CHANGE UP (ref 32–36)
MCHC RBC-ENTMCNC: 37.1 PG — HIGH (ref 27–34)
MCHC RBC-ENTMCNC: 37.1 PG — HIGH (ref 27–34)
MCV RBC AUTO: 108.6 FL — HIGH (ref 80–100)
MCV RBC AUTO: 108.6 FL — HIGH (ref 80–100)
MONOCYTES # BLD AUTO: 0.04 K/UL — SIGNIFICANT CHANGE UP (ref 0–0.9)
MONOCYTES # BLD AUTO: 0.04 K/UL — SIGNIFICANT CHANGE UP (ref 0–0.9)
MONOCYTES NFR BLD AUTO: 4 % — SIGNIFICANT CHANGE UP (ref 2–14)
MONOCYTES NFR BLD AUTO: 4 % — SIGNIFICANT CHANGE UP (ref 2–14)
NEUTROPHILS # BLD AUTO: 0.27 K/UL — LOW (ref 1.8–7.4)
NEUTROPHILS # BLD AUTO: 0.27 K/UL — LOW (ref 1.8–7.4)
NEUTROPHILS NFR BLD AUTO: 26.3 % — LOW (ref 43–77)
NEUTROPHILS NFR BLD AUTO: 26.3 % — LOW (ref 43–77)
OVALOCYTES BLD QL SMEAR: SLIGHT — SIGNIFICANT CHANGE UP
OVALOCYTES BLD QL SMEAR: SLIGHT — SIGNIFICANT CHANGE UP
PHOSPHATE SERPL-MCNC: 5.2 MG/DL — HIGH (ref 2.5–4.5)
PHOSPHATE SERPL-MCNC: 5.2 MG/DL — HIGH (ref 2.5–4.5)
PLAT MORPH BLD: NORMAL — SIGNIFICANT CHANGE UP
PLAT MORPH BLD: NORMAL — SIGNIFICANT CHANGE UP
PLATELET # BLD AUTO: 146 K/UL — LOW (ref 150–400)
PLATELET # BLD AUTO: 146 K/UL — LOW (ref 150–400)
POIKILOCYTOSIS BLD QL AUTO: SLIGHT — SIGNIFICANT CHANGE UP
POIKILOCYTOSIS BLD QL AUTO: SLIGHT — SIGNIFICANT CHANGE UP
POTASSIUM SERPL-MCNC: 4.3 MMOL/L — SIGNIFICANT CHANGE UP (ref 3.5–5.3)
POTASSIUM SERPL-MCNC: 4.3 MMOL/L — SIGNIFICANT CHANGE UP (ref 3.5–5.3)
POTASSIUM SERPL-SCNC: 4.3 MMOL/L — SIGNIFICANT CHANGE UP (ref 3.5–5.3)
POTASSIUM SERPL-SCNC: 4.3 MMOL/L — SIGNIFICANT CHANGE UP (ref 3.5–5.3)
PROT SERPL-MCNC: 6.2 G/DL — SIGNIFICANT CHANGE UP (ref 6–8.3)
PROT SERPL-MCNC: 6.2 G/DL — SIGNIFICANT CHANGE UP (ref 6–8.3)
RBC # BLD: 2.21 M/UL — LOW (ref 4.2–5.8)
RBC # BLD: 2.21 M/UL — LOW (ref 4.2–5.8)
RBC # FLD: 23.8 % — HIGH (ref 10.3–14.5)
RBC # FLD: 23.8 % — HIGH (ref 10.3–14.5)
RBC BLD AUTO: ABNORMAL
RBC BLD AUTO: ABNORMAL
SODIUM SERPL-SCNC: 139 MMOL/L — SIGNIFICANT CHANGE UP (ref 135–145)
SODIUM SERPL-SCNC: 139 MMOL/L — SIGNIFICANT CHANGE UP (ref 135–145)
WBC # BLD: 1.02 K/UL — LOW (ref 3.8–10.5)
WBC # BLD: 1.02 K/UL — LOW (ref 3.8–10.5)
WBC # FLD AUTO: 1.02 K/UL — LOW (ref 3.8–10.5)
WBC # FLD AUTO: 1.02 K/UL — LOW (ref 3.8–10.5)

## 2023-11-24 PROCEDURE — 71045 X-RAY EXAM CHEST 1 VIEW: CPT | Mod: 26

## 2023-11-24 PROCEDURE — 99233 SBSQ HOSP IP/OBS HIGH 50: CPT | Mod: GC

## 2023-11-24 PROCEDURE — 99233 SBSQ HOSP IP/OBS HIGH 50: CPT

## 2023-11-24 PROCEDURE — 76770 US EXAM ABDO BACK WALL COMP: CPT | Mod: 26

## 2023-11-24 RX ORDER — FUROSEMIDE 40 MG
40 TABLET ORAL DAILY
Refills: 0 | Status: DISCONTINUED | OUTPATIENT
Start: 2023-11-25 | End: 2023-11-25

## 2023-11-24 RX ORDER — FUROSEMIDE 40 MG
20 TABLET ORAL DAILY
Refills: 0 | Status: DISCONTINUED | OUTPATIENT
Start: 2023-11-24 | End: 2023-11-24

## 2023-11-24 RX ADMIN — SERTRALINE 25 MILLIGRAM(S): 25 TABLET, FILM COATED ORAL at 13:47

## 2023-11-24 RX ADMIN — PANTOPRAZOLE SODIUM 40 MILLIGRAM(S): 20 TABLET, DELAYED RELEASE ORAL at 05:16

## 2023-11-24 RX ADMIN — GABAPENTIN 300 MILLIGRAM(S): 400 CAPSULE ORAL at 13:46

## 2023-11-24 RX ADMIN — ATORVASTATIN CALCIUM 80 MILLIGRAM(S): 80 TABLET, FILM COATED ORAL at 21:01

## 2023-11-24 RX ADMIN — Medication 150 MILLIGRAM(S): at 05:16

## 2023-11-24 RX ADMIN — Medication 81 MILLIGRAM(S): at 13:46

## 2023-11-24 RX ADMIN — CLOPIDOGREL BISULFATE 75 MILLIGRAM(S): 75 TABLET, FILM COATED ORAL at 13:46

## 2023-11-24 RX ADMIN — CHLORHEXIDINE GLUCONATE 1 APPLICATION(S): 213 SOLUTION TOPICAL at 11:20

## 2023-11-24 NOTE — PHYSICAL THERAPY INITIAL EVALUATION ADULT - NSPTDISCHREC_GEN_A_CORE
If pt goes home, recommend Home PT, caregiver assist with all OOB mobility/ADLs. 3:1 commode, ambulance transport home for safe home entry, encourage energy conservation and reduce fall risk. Patient is confined to a single room without a bathroom and requires a commode for safe home discharge./Sub-acute Rehab

## 2023-11-24 NOTE — PROGRESS NOTE ADULT - PROBLEM SELECTOR PLAN 7
Patient with symptoms suggestive of claudication  - blisters may be sequelae of PAD  - severe functional limitation    Plan:  > hold home cilostazol due to ADHF  > follow up CHER/PVR

## 2023-11-24 NOTE — PROGRESS NOTE ADULT - ASSESSMENT
82 y/o M with PMH of gout, myelodysplastic syndrome, coronary artery disease, carotid artery stenosis, hypertension, hyperlipidemia, chronic kidney disease stage III, renal artery stenosis (s/p left renal artery stent), spinal stenosis, and atrial tachycardia initially presenting with AHRF, concern for volume overload with episodic lower chest pain. Cardiology consulted for new HFrEF with WMA.    #HFrEF likely 2/2 known CAD  Previous TTE 1/2023: moderate AS, normal RV/LV function  TTE 11/2023: LVEF 40% - basal and mid anterolateral, basal and mid inferior, basal and mid inferolateral, and basal anteroseptal WMAs noted. Grade II diastolic dysfunction. Moderate AS, KEENA 1.06 cm.  pBNP 6702, trop 154 -> 145  LHC 3/18/2021: pRCA 100% , mLAD 60%, OM1 60% - medical management, PCI of  deferred at that time    - please repeat CXR today  - Asprin 81mg daily  - Plavix 75mg daily  - Toprol 100mg QD  - would resume diuresis- would dose at least 40mg based on renal function  - Replete K > 4, Mg > 2  - Daily standing weights, strict I/Os  - Holding off further ischemic work up pending improvement in renal function    Janet Cortes MD  PGY-4, Cardiology Fellow    Please check [email or web address]: [email or web address] for cardiology service schedule and contact information.   *Please note that all recommendations are incomplete until attending attestation* 82 y/o M with PMH of gout, myelodysplastic syndrome, coronary artery disease, carotid artery stenosis, hypertension, hyperlipidemia, chronic kidney disease stage III, renal artery stenosis (s/p left renal artery stent), spinal stenosis, and atrial tachycardia initially presenting with AHRF, concern for volume overload with episodic lower chest pain. Cardiology consulted for new HFrEF with WMA.    #HFrEF likely 2/2 known CAD  Previous TTE 1/2023: moderate AS, normal RV/LV function  TTE 11/2023: LVEF 40% - basal and mid anterolateral, basal and mid inferior, basal and mid inferolateral, and basal anteroseptal WMAs noted. Grade II diastolic dysfunction. Moderate AS, KEENA 1.06 cm.  pBNP 6702, trop 154 -> 145  LHC 3/18/2021: pRCA 100% , mLAD 60%, OM1 60% - medical management, PCI of  deferred at that time    - please repeat CXR today  - Asprin 81mg daily  - Plavix 75mg daily  - Toprol 100mg QD  - would resume diuresis- would dose at least 40mg based on renal function  - Replete K > 4, Mg > 2  - Daily standing weights, strict I/Os  - Holding off further ischemic work up pending improvement in renal function    Janet Cortes MD  PGY-4, Cardiology Fellow    Please check [email or web address]: [email or web address] for cardiology service schedule and contact information.

## 2023-11-24 NOTE — PROGRESS NOTE ADULT - PROBLEM SELECTOR PLAN 2
Troponins elevated, downtrending  - Likely due to Type 2 MI from demand ischemia in the setting of hypoxia and reduced clearance in the setting of CKD  - r/o atypical angina given 2 week history of heartburn and epigastric pain, not worsened by exertion

## 2023-11-24 NOTE — PROGRESS NOTE ADULT - SUBJECTIVE AND OBJECTIVE BOX
Cardiology Progress Note  ------------------------------------------------------------------------------------------  SUBJECTIVE:   - Tele: NSR with run of NSVT this AM as well as last night  - No events overnight. Denies CP, SOB or Palpitations.     VS:  T(F): 98.2 (), Max: 98.2 ()  HR: 68 () (65 - 87)  BP: 136/71 () (130/73 - 136/71)  RR: 18 ()  SpO2: 90% ()  I&O's Summary    2023 07: 07:00  --------------------------------------------------------  IN: 680 mL / OUT: 1600 mL / NET: -920 mL    2023 07:  -  2023 11:14  --------------------------------------------------------  IN: 310 mL / OUT: 0 mL / NET: 310 mL      PHYSICAL EXAM:  GENERAL: NAD  HEAD:  Atraumatic, Normocephalic.  EYES: EOMI, PERRLA, conjunctiva and sclera clear.  ENT: Moist mucous membranes.  NECK: Supple, No JVD.  CHEST/LUNG: Clear to auscultation bilaterally; No rales, rhonchi, wheezing, or rubs. Unlabored respirations.  HEART: Regular rate and rhythm; No murmurs, rubs, or gallops.  ABDOMEN: Bowel sounds present; Soft, Nontender, Nondistended.   EXTREMITIES: trace edema in LE. necrotic wound on left great toe  PSYCH: Normal affect.  SKIN: No rashes or lesions.  -------------------------------------------------------------------------------------------  LABS:                          8.2    1.02  )-----------( 146      ( 2023 07:02 )             24.0     11-24    139  |  104  |  47<H>  ----------------------------<  133<H>  4.3   |  22  |  3.52<H>    Ca    8.4      2023 07:02  Phos  5.2     11-24  Mg     2.7     11-24    TPro  6.2  /  Alb  3.6  /  TBili  0.4  /  DBili  x   /  AST  14  /  ALT  8<L>  /  AlkPhos  86  11-24    PT/INR - ( 2023 09:15 )   PT: 12.8 sec;   INR: 1.17 ratio         PTT - ( 2023 09:15 )  PTT:27.2 sec  CARDIAC MARKERS ( 2023 06:46 )  x     / x     / x     / 45 U/L / x     / x      CARDIAC MARKERS ( 2023 20:24 )  145 ng/L / x     / x     / x     / x     / x      CARDIAC MARKERS ( 2023 18:51 )  154 ng/L / x     / x     / x     / x     / x          Total Cholesterol: 124  LDL: --  HDL: 27  T        -------------------------------------------------------------------------------------------  Meds:  aluminum hydroxide/magnesium hydroxide/simethicone Suspension 30 milliLiter(s) Oral every 6 hours PRN  aspirin enteric coated 81 milliGRAM(s) Oral daily  atorvastatin 80 milliGRAM(s) Oral at bedtime  chlorhexidine 2% Cloths 1 Application(s) Topical daily  clopidogrel Tablet 75 milliGRAM(s) Oral daily  cyclobenzaprine 5 milliGRAM(s) Oral three times a day PRN  gabapentin 300 milliGRAM(s) Oral daily  metoprolol succinate  milliGRAM(s) Oral daily  pantoprazole    Tablet 40 milliGRAM(s) Oral before breakfast  sertraline 25 milliGRAM(s) Oral daily

## 2023-11-24 NOTE — PROGRESS NOTE ADULT - PROBLEM SELECTOR PLAN 3
Patient follows with Dr. Zita Mcmahon at [facility]  - was previously on lenolidamide but held given pancytopenia. Last chemo on 10/30 with decitabine   - patient currently pancytopenic. but appears to be near baseline labs per outpt review  - receiving procrit injections with improvement in pancytopenia    Plan:  > continue with acyclovir for ppx  > fluconazole ppx  > hematology on board, appreciate recs  > maintain Hgb>8 given history of CAD. s/p 1 unit of pRBC

## 2023-11-24 NOTE — PHYSICAL THERAPY INITIAL EVALUATION ADULT - PERTINENT HX OF CURRENT PROBLEM, REHAB EVAL
Pt is a 83 year old M with gout, myelodysplastic syndrome, coronary artery disease, carotid artery stenosis, hypertension, hyperlipidemia, chronic kidney disease stage III, renal artery stenosis (s/p left renal artery stent), spinal stenosis, and atrial tachycardia who presents for hypoxia noted at his hematology clinic. The patient reports that he went to the hematology clinic for a regular platelet transfusion, and was incidentally noted to be hypoxic without any respiratory symptoms. He reports that a couple of nights ago, he had a night where he had persistent epigastric pain that kept him up at night, and for which he took several tums. Aside from that instance, the patient has been feeling well and has no acute complaints. He reports that he continues to have lower extremity edema, which he states is chronic, in addition to the chronic bilateral LE "tightness" with ambulation. The patient also reports that nearly 10 days ago he began having blistering of his feet with associated redness and some drainage. He was seen by his PCP on 11/13 for swollen feet with blistering and found to have suspected cellulitis of the legs. The patient was initially prescribed augmentin, but continued to have symptoms so was prescribed bactrim on 11/16. The patient reports that since then, he has felt well and has no acute complaints. He denies any orthopnea, PND, worsening LE edema, chest pain or shortness of breath.  IMPRESSION: No thoracic or abdominal aortic aneurysm or dissection. Small bilateral pleural effusions with mild interlobular septal thickening which may reflect mild pulmonary interstitial edema. Compressive bilateral lower lobe atelectasis  CXR: Layering small bilateral pleural effusions with or without components of atelectasis. Mild pulmonary edema.

## 2023-11-24 NOTE — PHYSICAL THERAPY INITIAL EVALUATION ADULT - GENERAL OBSERVATIONS, REHAB EVAL
Pt received supine in NAD, with PIV, RUE PICC, 2L NC, sandhu, A&Ox4, VS screened and stable. H/H/PLT stable.

## 2023-11-24 NOTE — PROGRESS NOTE ADULT - PROBLEM SELECTOR PLAN 5
Patient with known atrial tachycardia, diagnosed during admission 1/2023   - patient followed by Dr. Reddy for cardiology, and Dr. Feldman for EP  - recently had event monitor outpatient revealing significant runs of supraventricular tachycardia, though asymptomatic during these events  - restarted on BB recently by EP    Plan:  - increase metoprolol succinate to 150 mg qD  - EKG today: frequent PACs

## 2023-11-24 NOTE — PROGRESS NOTE ADULT - PROBLEM SELECTOR PLAN 4
Patient found to have prolonged QTc to 505  - may be falsely elevated in the setting of tachycardia with significant ectopic beats  - on fluconazole at home    Plan:  > QTc 517 today. hold home fluconazole  > Optimize K, Mg, Ca  > avoid QTc prolonging meds

## 2023-11-24 NOTE — PROGRESS NOTE ADULT - PROBLEM SELECTOR PLAN 6
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 Cabral (private nephro)  - FeUrea 58.3%, c/w intrinsic kidney disease    Plan:  > continue to monitor, expect improvement with continued diuresis  > avoid nephrotoxic meds  > hold lasix  > f/u kidney and bladder US

## 2023-11-24 NOTE — PROGRESS NOTE ADULT - PROBLEM SELECTOR PLAN 1
Patient presented with incidental, asymptomatic hypoxemia at hematology clinic, down to 88% on room air  - in the ED, patient noted to be persistently hypoxemic to 88% on 5L NC with tachypnea, requiring BIPAP  - CT chest without PE, small bilateral pleural effusions, mild pulmonary edema  - ProBNP elevated to 6700, higher in previous admission  - had episode of epigastric "lower chest" pain in ED with associated diaphoresis and respiratory distress, may be atypical presentation of cardiac ischemic pain, but since resolved  - TTE 1/2023: moderate AS, normal RV/LV function    Plan:  > hold lasix due to JUAN RAMON. Pt euvolemic  > TTE 11/21 EF 40%, moderate LV diastolic dysfunction, basal and mid anterolateral wall, basal and mid inferior wall, basal and mid inferolateral wall, and basal anteroseptal segment are abnormal, moderate AS  > cards consulted for ischemic eval for newly-reduced EF with new wall motional abnl and atypical angina  > increase home metoprolol to 150 mg ER qD given significant ectopy (PAC, PVCs, Vtach) on tele  > incentive spirometer given atelectasis on CT  > wean off supplemental O2 as tolerated

## 2023-11-24 NOTE — PHYSICAL THERAPY INITIAL EVALUATION ADULT - ADDITIONAL COMMENTS
Pt lives alone in private house with 6 steps to enter +handrail and 12 steps to bed/bath - no bathroom on first floor. Pt performs ADLs and ambulates with RW, has Home Health Aide 24/7 per pt, assisting with all ADLs and ambulation. Per pt he was able to negotiate stairs but had difficulty. Pt lives alone in private house with 6 steps to enter +handrail and 12 steps to bed/bath - no bathroom on first floor. Pt performs ADLs and ambulates with RW, has Home Health Aide 24/7 per pt, assisting with all ADLs and ambulation. Per pt he was able to negotiate stairs but had difficulty. Owns: rolling walker, cane, w/c, tub shower with shower chair, grab bars in shower and by toilet.

## 2023-11-24 NOTE — PROGRESS NOTE ADULT - SUBJECTIVE AND OBJECTIVE BOX
Overnight events noted      VITAL:  T(C): , Max: 36.8 (11-24-23 @ 06:30)  T(F): , Max: 98.2 (11-24-23 @ 06:30)  HR: 70 (11-24-23 @ 06:30)  BP: 136/71 (11-24-23 @ 06:30)  BP(mean): --  RR: 18 (11-24-23 @ 06:30)  SpO2: 95% (11-24-23 @ 06:30)  urine output 1600cc/24h      PHYSICAL EXAM:  Constitutional: NAD at rest on NCO2  HEENT: NCAT, DMM  Neck: Supple, No JVD  Respiratory: (+)bibasilar rales  Cardiovascular: reg s1s2, (+)1/6 LESA  Gastrointestinal: BS+, soft, NT/ND  Extremities: No peripheral edema b/l  Neurological: reduced generalized strength  Back: no CVAT b/l  Skin: (+)erythema b/l shins    LABS:                        8.2    1.02  )-----------( 146      ( 24 Nov 2023 07:02 )             24.0     Na(139)/K(4.3)/Cl(104)/HCO3(22)/BUN(47)/Cr(3.52)Glu(133)/Ca(8.4)/Mg(2.7)/PO4(5.2)    11-24 @ 07:02  Na(138)/K(4.1)/Cl(102)/HCO3(23)/BUN(46)/Cr(3.76)Glu(175)/Ca(8.5)/Mg(2.4)/PO4(4.7)    11-23 @ 09:16  Na(136)/K(4.3)/Cl(100)/HCO3(22)/BUN(40)/Cr(3.58)Glu(134)/Ca(8.7)/Mg(2.4)/PO4(4.4)    11-22 @ 06:46        IMPRESSION: 83M w/ HTN, gout, MDS, USHA, and CKD, 11/20/23 p/w hypoxia    (1)CKD - nonproteinuric CKD4 - largely due to renal artery stenosis; nephrolithiasis, hypertensive nephrosclerosis, and renal atheroembolic disease also 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 Improved, with diuresis      RECOMMEND:  (1)Can add back his home dose of Lasix (40mg po qd)  (2)BMP+Mg+PO4 daily while admitted  (3)Dose new meds for GFR 15-20ml/min          Edis Cabral MD  [facility]  Office/on call physician: [phone]  Cell (7a-7p): [phone]       No pain, no sob      VITAL:  T(C): , Max: 36.8 (11-24-23 @ 06:30)  T(F): , Max: 98.2 (11-24-23 @ 06:30)  HR: 70 (11-24-23 @ 06:30)  BP: 136/71 (11-24-23 @ 06:30)  BP(mean): --  RR: 18 (11-24-23 @ 06:30)  SpO2: 95% (11-24-23 @ 06:30)  urine output 1600cc/24h      PHYSICAL EXAM:  Constitutional: NAD at rest on NCO2  HEENT: NCAT, DMM  Neck: Supple, No JVD  Respiratory: (+)bibasilar rales  Cardiovascular: reg s1s2, (+)1/6 LESA  Gastrointestinal: BS+, soft, NT/ND  Extremities: No peripheral edema b/l  Neurological: reduced generalized strength  Back: no CVAT b/l  Skin: (+)erythema b/l shins    LABS:                        8.2    1.02  )-----------( 146      ( 24 Nov 2023 07:02 )             24.0     Na(139)/K(4.3)/Cl(104)/HCO3(22)/BUN(47)/Cr(3.52)Glu(133)/Ca(8.4)/Mg(2.7)/PO4(5.2)    11-24 @ 07:02  Na(138)/K(4.1)/Cl(102)/HCO3(23)/BUN(46)/Cr(3.76)Glu(175)/Ca(8.5)/Mg(2.4)/PO4(4.7)    11-23 @ 09:16  Na(136)/K(4.3)/Cl(100)/HCO3(22)/BUN(40)/Cr(3.58)Glu(134)/Ca(8.7)/Mg(2.4)/PO4(4.4)    11-22 @ 06:46        IMPRESSION: 83M w/ HTN, gout, MDS, USHA, and CKD, 11/20/23 p/w hypoxia    (1)CKD - nonproteinuric CKD4 - largely due to renal artery stenosis; nephrolithiasis, hypertensive nephrosclerosis, and renal atheroembolic disease also 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 Improved, with diuresis      RECOMMEND:  (1)Can add back his home dose of Lasix (40mg po qd)  (2)BMP+Mg+PO4 daily while admitted  (3)Dose new meds for GFR 15-20ml/min          Edis Cabral MD  [facility]  Office/on call physician: [phone]  Cell (7a-7p): [phone]       No pain, no sob      VITAL:  T(C): , Max: 36.8 (11-24-23 @ 06:30)  T(F): , Max: 98.2 (11-24-23 @ 06:30)  HR: 70 (11-24-23 @ 06:30)  BP: 136/71 (11-24-23 @ 06:30)  BP(mean): --  RR: 18 (11-24-23 @ 06:30)  SpO2: 95% (11-24-23 @ 06:30)  urine output 1600cc/24h      PHYSICAL EXAM:  Constitutional: NAD at rest on NCO2  HEENT: NCAT, DMM  Neck: Supple, No JVD  Respiratory: (+)bibasilar rales  Cardiovascular: reg s1s2, (+)1/6 LESA  Gastrointestinal: BS+, soft, NT/ND  Extremities: No peripheral edema b/l  Neurological: reduced generalized strength  Back: no CVAT b/l  Skin: (+)erythema b/l shins    LABS:                        8.2    1.02  )-----------( 146      ( 24 Nov 2023 07:02 )             24.0     Na(139)/K(4.3)/Cl(104)/HCO3(22)/BUN(47)/Cr(3.52)Glu(133)/Ca(8.4)/Mg(2.7)/PO4(5.2)    11-24 @ 07:02  Na(138)/K(4.1)/Cl(102)/HCO3(23)/BUN(46)/Cr(3.76)Glu(175)/Ca(8.5)/Mg(2.4)/PO4(4.7)    11-23 @ 09:16  Na(136)/K(4.3)/Cl(100)/HCO3(22)/BUN(40)/Cr(3.58)Glu(134)/Ca(8.7)/Mg(2.4)/PO4(4.4)    11-22 @ 06:46        IMPRESSION: 83M w/ HTN, gout, MDS, USHA, and CKD, 11/20/23 p/w hypoxia    (1)CKD - nonproteinuric CKD4 - largely due to renal artery stenosis; nephrolithiasis, hypertensive nephrosclerosis, and renal atheroembolic disease also 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 Improved, with diuresis. Potentially for eventual cardiac cath this admission? Would hold off until creatinine trends down below 2.5      RECOMMEND:  (1)Can add back his home dose of Lasix (40mg po qd)  (2)BMP+Mg+PO4 daily while admitted  (3)Dose new meds for GFR 15-20ml/min  (4)No cardiac cath until creat trends down below 2.5 (unless urgently needed)        Edis Cabral MD  [facility]  Office/on call physician: [phone]  Cell (7a-7p): [phone]

## 2023-11-25 LAB
ALBUMIN SERPL ELPH-MCNC: 3.3 G/DL — SIGNIFICANT CHANGE UP (ref 3.3–5)
ALBUMIN SERPL ELPH-MCNC: 3.3 G/DL — SIGNIFICANT CHANGE UP (ref 3.3–5)
ALP SERPL-CCNC: 87 U/L — SIGNIFICANT CHANGE UP (ref 40–120)
ALP SERPL-CCNC: 87 U/L — SIGNIFICANT CHANGE UP (ref 40–120)
ALT FLD-CCNC: 9 U/L — LOW (ref 10–45)
ALT FLD-CCNC: 9 U/L — LOW (ref 10–45)
ANION GAP SERPL CALC-SCNC: 13 MMOL/L — SIGNIFICANT CHANGE UP (ref 5–17)
ANION GAP SERPL CALC-SCNC: 13 MMOL/L — SIGNIFICANT CHANGE UP (ref 5–17)
AST SERPL-CCNC: 13 U/L — SIGNIFICANT CHANGE UP (ref 10–40)
AST SERPL-CCNC: 13 U/L — SIGNIFICANT CHANGE UP (ref 10–40)
BILIRUB SERPL-MCNC: 0.4 MG/DL — SIGNIFICANT CHANGE UP (ref 0.2–1.2)
BILIRUB SERPL-MCNC: 0.4 MG/DL — SIGNIFICANT CHANGE UP (ref 0.2–1.2)
BUN SERPL-MCNC: 42 MG/DL — HIGH (ref 7–23)
BUN SERPL-MCNC: 42 MG/DL — HIGH (ref 7–23)
CALCIUM SERPL-MCNC: 8.5 MG/DL — SIGNIFICANT CHANGE UP (ref 8.4–10.5)
CALCIUM SERPL-MCNC: 8.5 MG/DL — SIGNIFICANT CHANGE UP (ref 8.4–10.5)
CHLORIDE SERPL-SCNC: 105 MMOL/L — SIGNIFICANT CHANGE UP (ref 96–108)
CHLORIDE SERPL-SCNC: 105 MMOL/L — SIGNIFICANT CHANGE UP (ref 96–108)
CO2 SERPL-SCNC: 22 MMOL/L — SIGNIFICANT CHANGE UP (ref 22–31)
CO2 SERPL-SCNC: 22 MMOL/L — SIGNIFICANT CHANGE UP (ref 22–31)
CREAT SERPL-MCNC: 3.16 MG/DL — HIGH (ref 0.5–1.3)
CREAT SERPL-MCNC: 3.16 MG/DL — HIGH (ref 0.5–1.3)
EGFR: 19 ML/MIN/1.73M2 — LOW
EGFR: 19 ML/MIN/1.73M2 — LOW
GLUCOSE SERPL-MCNC: 96 MG/DL — SIGNIFICANT CHANGE UP (ref 70–99)
GLUCOSE SERPL-MCNC: 96 MG/DL — SIGNIFICANT CHANGE UP (ref 70–99)
HCT VFR BLD CALC: 25.6 % — LOW (ref 39–50)
HCT VFR BLD CALC: 25.6 % — LOW (ref 39–50)
HGB BLD-MCNC: 8.5 G/DL — LOW (ref 13–17)
HGB BLD-MCNC: 8.5 G/DL — LOW (ref 13–17)
MAGNESIUM SERPL-MCNC: 2.7 MG/DL — HIGH (ref 1.6–2.6)
MAGNESIUM SERPL-MCNC: 2.7 MG/DL — HIGH (ref 1.6–2.6)
MCHC RBC-ENTMCNC: 33.2 GM/DL — SIGNIFICANT CHANGE UP (ref 32–36)
MCHC RBC-ENTMCNC: 33.2 GM/DL — SIGNIFICANT CHANGE UP (ref 32–36)
MCHC RBC-ENTMCNC: 37.1 PG — HIGH (ref 27–34)
MCHC RBC-ENTMCNC: 37.1 PG — HIGH (ref 27–34)
MCV RBC AUTO: 111.8 FL — HIGH (ref 80–100)
MCV RBC AUTO: 111.8 FL — HIGH (ref 80–100)
NRBC # BLD: 0 /100 WBCS — SIGNIFICANT CHANGE UP (ref 0–0)
NRBC # BLD: 0 /100 WBCS — SIGNIFICANT CHANGE UP (ref 0–0)
PHOSPHATE SERPL-MCNC: 4.9 MG/DL — HIGH (ref 2.5–4.5)
PHOSPHATE SERPL-MCNC: 4.9 MG/DL — HIGH (ref 2.5–4.5)
PLATELET # BLD AUTO: 170 K/UL — SIGNIFICANT CHANGE UP (ref 150–400)
PLATELET # BLD AUTO: 170 K/UL — SIGNIFICANT CHANGE UP (ref 150–400)
POTASSIUM SERPL-MCNC: 4.5 MMOL/L — SIGNIFICANT CHANGE UP (ref 3.5–5.3)
POTASSIUM SERPL-MCNC: 4.5 MMOL/L — SIGNIFICANT CHANGE UP (ref 3.5–5.3)
POTASSIUM SERPL-SCNC: 4.5 MMOL/L — SIGNIFICANT CHANGE UP (ref 3.5–5.3)
POTASSIUM SERPL-SCNC: 4.5 MMOL/L — SIGNIFICANT CHANGE UP (ref 3.5–5.3)
PROT SERPL-MCNC: 6.3 G/DL — SIGNIFICANT CHANGE UP (ref 6–8.3)
PROT SERPL-MCNC: 6.3 G/DL — SIGNIFICANT CHANGE UP (ref 6–8.3)
RBC # BLD: 2.29 M/UL — LOW (ref 4.2–5.8)
RBC # BLD: 2.29 M/UL — LOW (ref 4.2–5.8)
RBC # FLD: 23.7 % — HIGH (ref 10.3–14.5)
RBC # FLD: 23.7 % — HIGH (ref 10.3–14.5)
SODIUM SERPL-SCNC: 140 MMOL/L — SIGNIFICANT CHANGE UP (ref 135–145)
SODIUM SERPL-SCNC: 140 MMOL/L — SIGNIFICANT CHANGE UP (ref 135–145)
WBC # BLD: 1.24 K/UL — LOW (ref 3.8–10.5)
WBC # BLD: 1.24 K/UL — LOW (ref 3.8–10.5)
WBC # FLD AUTO: 1.24 K/UL — LOW (ref 3.8–10.5)
WBC # FLD AUTO: 1.24 K/UL — LOW (ref 3.8–10.5)

## 2023-11-25 PROCEDURE — 99233 SBSQ HOSP IP/OBS HIGH 50: CPT | Mod: GC

## 2023-11-25 RX ORDER — ACYCLOVIR SODIUM 500 MG
200 VIAL (EA) INTRAVENOUS EVERY 12 HOURS
Refills: 0 | Status: DISCONTINUED | OUTPATIENT
Start: 2023-11-26 | End: 2023-11-28

## 2023-11-25 RX ORDER — FUROSEMIDE 40 MG
40 TABLET ORAL DAILY
Refills: 0 | Status: DISCONTINUED | OUTPATIENT
Start: 2023-11-26 | End: 2023-11-28

## 2023-11-25 RX ADMIN — ATORVASTATIN CALCIUM 80 MILLIGRAM(S): 80 TABLET, FILM COATED ORAL at 21:07

## 2023-11-25 RX ADMIN — CHLORHEXIDINE GLUCONATE 1 APPLICATION(S): 213 SOLUTION TOPICAL at 11:42

## 2023-11-25 RX ADMIN — Medication 81 MILLIGRAM(S): at 11:43

## 2023-11-25 RX ADMIN — CLOPIDOGREL BISULFATE 75 MILLIGRAM(S): 75 TABLET, FILM COATED ORAL at 11:43

## 2023-11-25 RX ADMIN — Medication 150 MILLIGRAM(S): at 05:34

## 2023-11-25 RX ADMIN — SERTRALINE 25 MILLIGRAM(S): 25 TABLET, FILM COATED ORAL at 11:44

## 2023-11-25 RX ADMIN — PANTOPRAZOLE SODIUM 40 MILLIGRAM(S): 20 TABLET, DELAYED RELEASE ORAL at 05:42

## 2023-11-25 RX ADMIN — Medication 40 MILLIGRAM(S): at 05:34

## 2023-11-25 RX ADMIN — GABAPENTIN 300 MILLIGRAM(S): 400 CAPSULE ORAL at 11:43

## 2023-11-25 NOTE — PROGRESS NOTE ADULT - PROBLEM SELECTOR PLAN 7
Patient with symptoms suggestive of claudication  - blisters may be sequelae of PAD  - severe functional limitation    Plan:  > hold home cilostazol due to ADHF  > follow up CHER/PVR Patient with symptoms suggestive of claudication  - blisters may be sequelae of PAD  - severe functional limitation    Plan:  > hold home cilostazol due to ADHF

## 2023-11-25 NOTE — PROGRESS NOTE ADULT - SUBJECTIVE AND OBJECTIVE BOX
PROGRESS NOTE:   Authored by Dr. Michelle Bravo MD (PGY-3). Pager [facility] [phone]/ [facility] [id number]    Patient is a 83y old  Male who presents with a chief complaint of acute hypoxic respiratory failure (24 Nov 2023 11:13)      SUBJECTIVE / OVERNIGHT EVENTS:  No acute events overnight.     MEDICATIONS  (STANDING):  aspirin enteric coated 81 milliGRAM(s) Oral daily  atorvastatin 80 milliGRAM(s) Oral at bedtime  chlorhexidine 2% Cloths 1 Application(s) Topical daily  clopidogrel Tablet 75 milliGRAM(s) Oral daily  furosemide   Injectable 40 milliGRAM(s) IV Push daily  gabapentin 300 milliGRAM(s) Oral daily  metoprolol succinate  milliGRAM(s) Oral daily  pantoprazole    Tablet 40 milliGRAM(s) Oral before breakfast  sertraline 25 milliGRAM(s) Oral daily    MEDICATIONS  (PRN):  aluminum hydroxide/magnesium hydroxide/simethicone Suspension 30 milliLiter(s) Oral every 6 hours PRN Dyspepsia  cyclobenzaprine 5 milliGRAM(s) Oral three times a day PRN Muscle Spasm      CAPILLARY BLOOD GLUCOSE        I&O's Summary    24 Nov 2023 07:01  -  25 Nov 2023 07:00  --------------------------------------------------------  IN: 810 mL / OUT: 975 mL / NET: -165 mL        PHYSICAL EXAM:  Vital Signs Last 24 Hrs  T(C): 36.4 (25 Nov 2023 04:30), Max: 36.8 (24 Nov 2023 20:30)  T(F): 97.6 (25 Nov 2023 04:30), Max: 98.2 (24 Nov 2023 20:30)  HR: 67 (25 Nov 2023 04:30) (67 - 72)  BP: 143/81 (25 Nov 2023 04:30) (114/64 - 143/81)  BP(mean): --  RR: 18 (25 Nov 2023 04:30) (18 - 18)  SpO2: 94% (25 Nov 2023 04:30) (90% - 98%)    Parameters below as of 25 Nov 2023 04:30  Patient On (Oxygen Delivery Method): nasal cannula  O2 Flow (L/min): 2      CONSTITUTIONAL: NAD  HEENT: MMM, EOMI, PERRLA  NECK: supple  RESPIRATORY: Normal respiratory effort; lungs are clear to auscultation bilaterally  CARDIOVASCULAR: Regular rate and rhythm, normal S1 and S2, no murmur/rub/gallop; No lower extremity edema  ABDOMEN: Nontender to palpation, normoactive bowel sounds, no rebound/guarding; No hepatosplenomegaly  MUSCULOSKELETAL: no clubbing or cyanosis of digits; no joint swelling or tenderness to palpation  NEURO: alert and oriented to person, place, and time. Moving all four extremities, sensation grossly intact  PSYCH: alert and oriented to person, place, and time; affect appropriate  SKIN: No rash    LABS:                        8.5    1.24  )-----------( 170      ( 25 Nov 2023 06:49 )             25.6     11-25    140  |  105  |  42<H>  ----------------------------<  96  4.5   |  22  |  3.16<H>    Ca    8.5      25 Nov 2023 06:47  Phos  4.9     11-25  Mg     2.7     11-25    TPro  6.3  /  Alb  3.3  /  TBili  0.4  /  DBili  x   /  AST  13  /  ALT  9<L>  /  AlkPhos  87  11-25    PT/INR - ( 23 Nov 2023 09:15 )   PT: 12.8 sec;   INR: 1.17 ratio         PTT - ( 23 Nov 2023 09:15 )  PTT:27.2 sec      Urinalysis Basic - ( 25 Nov 2023 06:47 )    Color: x / Appearance: x / SG: x / pH: x  Gluc: 96 mg/dL / Ketone: x  / Bili: x / Urobili: x   Blood: x / Protein: x / Nitrite: x   Leuk Esterase: x / RBC: x / WBC x   Sq Epi: x / Non Sq Epi: x / Bacteria: x          Tele Reviewed:    RADIOLOGY & ADDITIONAL TESTS:  Results Reviewed:   Imaging Personally Reviewed:  Electrocardiogram Personally Reviewed:     PROGRESS NOTE:   Authored by Dr. Michelle Bravo MD (PGY-3). Pager [facility] [phone]/ [facility] [id number]    Patient is a 83y old  Male who presents with a chief complaint of acute hypoxic respiratory failure (24 Nov 2023 11:13)      SUBJECTIVE / OVERNIGHT EVENTS:  No acute events overnight.    Pt had episode of lightheadedness and sweating accompanied by bradycardia and hypotension while having a bowel movement this morning. Feeling better now. States he has had syncopal episodes while straining in the past. Currently without chest pain, SOB, dizziness, or lightheadedness    MEDICATIONS  (STANDING):  aspirin enteric coated 81 milliGRAM(s) Oral daily  atorvastatin 80 milliGRAM(s) Oral at bedtime  chlorhexidine 2% Cloths 1 Application(s) Topical daily  clopidogrel Tablet 75 milliGRAM(s) Oral daily  furosemide   Injectable 40 milliGRAM(s) IV Push daily  gabapentin 300 milliGRAM(s) Oral daily  metoprolol succinate  milliGRAM(s) Oral daily  pantoprazole    Tablet 40 milliGRAM(s) Oral before breakfast  sertraline 25 milliGRAM(s) Oral daily    MEDICATIONS  (PRN):  aluminum hydroxide/magnesium hydroxide/simethicone Suspension 30 milliLiter(s) Oral every 6 hours PRN Dyspepsia  cyclobenzaprine 5 milliGRAM(s) Oral three times a day PRN Muscle Spasm      CAPILLARY BLOOD GLUCOSE        I&O's Summary    24 Nov 2023 07:01  -  25 Nov 2023 07:00  --------------------------------------------------------  IN: 810 mL / OUT: 975 mL / NET: -165 mL        PHYSICAL EXAM:  Vital Signs Last 24 Hrs  T(C): 36.4 (25 Nov 2023 04:30), Max: 36.8 (24 Nov 2023 20:30)  T(F): 97.6 (25 Nov 2023 04:30), Max: 98.2 (24 Nov 2023 20:30)  HR: 67 (25 Nov 2023 04:30) (67 - 72)  BP: 143/81 (25 Nov 2023 04:30) (114/64 - 143/81)  BP(mean): --  RR: 18 (25 Nov 2023 04:30) (18 - 18)  SpO2: 94% (25 Nov 2023 04:30) (90% - 98%)    Parameters below as of 25 Nov 2023 04:30  Patient On (Oxygen Delivery Method): nasal cannula  O2 Flow (L/min): 2      CONSTITUTIONAL: NAD  HEENT: MMM, EOMI, PERRLA  NECK: supple, no JVD  RESPIRATORY: Normal respiratory effort; decreased BS at both lung bases, bibasilar crackles. No wheezing or   CARDIOVASCULAR: Regular rate and rhythm, normal S1 and S2, no murmur/rub/gallop; No lower extremity edema  ABDOMEN: Nontender to palpation, normoactive bowel sounds, no rebound/guarding; No hepatosplenomegaly  MUSCULOSKELETAL: no clubbing or cyanosis of digits; no joint swelling or tenderness to palpation  NEURO: alert and oriented to person, place, and time. Moving all four extremities, sensation grossly intact  SKIN: Dry. Pale. No rash    LABS:                        8.5    1.24  )-----------( 170      ( 25 Nov 2023 06:49 )             25.6     11-25    140  |  105  |  42<H>  ----------------------------<  96  4.5   |  22  |  3.16<H>    Ca    8.5      25 Nov 2023 06:47  Phos  4.9     11-25  Mg     2.7     11-25    TPro  6.3  /  Alb  3.3  /  TBili  0.4  /  DBili  x   /  AST  13  /  ALT  9<L>  /  AlkPhos  87  11-25    PT/INR - ( 23 Nov 2023 09:15 )   PT: 12.8 sec;   INR: 1.17 ratio         PTT - ( 23 Nov 2023 09:15 )  PTT:27.2 sec      Urinalysis Basic - ( 25 Nov 2023 06:47 )    Color: x / Appearance: x / SG: x / pH: x  Gluc: 96 mg/dL / Ketone: x  / Bili: x / Urobili: x   Blood: x / Protein: x / Nitrite: x   Leuk Esterase: x / RBC: x / WBC x   Sq Epi: x / Non Sq Epi: x / Bacteria: x          Tele Reviewed:    RADIOLOGY & ADDITIONAL TESTS:  Results Reviewed:   Imaging Personally Reviewed:  Electrocardiogram Personally Reviewed:

## 2023-11-25 NOTE — PROGRESS NOTE ADULT - PROBLEM SELECTOR PLAN 4
Patient found to have prolonged QTc to 505  - may be falsely elevated in the setting of tachycardia with significant ectopic beats  - on fluconazole at home    Plan:  > QTc 517 today. hold home fluconazole  > Optimize K, Mg, Ca  > avoid QTc prolonging meds Patient found to have prolonged QTc to 505  - may be falsely elevated in the setting of tachycardia with significant ectopic beats  - on fluconazole at home    Plan:  > QTc 517 11/23. hold home fluconazole  > Maintain K>4, Mg >2  > avoid QTc prolonging meds

## 2023-11-25 NOTE — PROVIDER CONTACT NOTE (CHANGE IN STATUS NOTIFICATION) - SITUATION
Pt had episodes of  reported by tele tech
patient felt light headed while having a bowel movement in the bathroom

## 2023-11-25 NOTE — PROVIDER CONTACT NOTE (CHANGE IN STATUS NOTIFICATION) - ASSESSMENT
initial Bp 62/48, patient pale and cool to touch   patient put back in bed /42  patient denies chest pain
Pt is AOx4, no CP or SOB. VS as charted. Tele tech reported pt with episodes of .

## 2023-11-25 NOTE — PROGRESS NOTE ADULT - ASSESSMENT
on room air  afebrile  Denies n/v/d,cp, sob  earlier event in Am noted      aluminum hydroxide/magnesium hydroxide/simethicone Suspension 30 milliLiter(s) Oral every 6 hours PRN  aspirin enteric coated 81 milliGRAM(s) Oral daily  atorvastatin 80 milliGRAM(s) Oral at bedtime  chlorhexidine 2% Cloths 1 Application(s) Topical daily  clopidogrel Tablet 75 milliGRAM(s) Oral daily  cyclobenzaprine 5 milliGRAM(s) Oral three times a day PRN  furosemide   Injectable 40 milliGRAM(s) IV Push daily  gabapentin 300 milliGRAM(s) Oral daily  metoprolol succinate  milliGRAM(s) Oral daily  pantoprazole    Tablet 40 milliGRAM(s) Oral before breakfast  sertraline 25 milliGRAM(s) Oral daily      VITAL:  T(C): , Max: 36.8 (11-24-23 @ 20:30)  T(F): , Max: 98.2 (11-24-23 @ 20:30)  HR: 53 (11-25-23 @ 08:20)  BP: 108/65 (11-25-23 @ 08:20)  BP(mean): --  RR: 18 (11-25-23 @ 08:20)  SpO2: 97% (11-25-23 @ 08:20)  Wt(kg): --    11-24-23 @ 07:01  -  11-25-23 @ 07:00  --------------------------------------------------------  IN: 810 mL / OUT: 975 mL / NET: -165 mL    11-25-23 @ 07:01  -  11-25-23 @ 10:46  --------------------------------------------------------  IN: 310 mL / OUT: 0 mL / NET: 310 mL        PHYSICAL EXAM:  Constitutional: NAD   HEENT: NCAT, DMM  Neck: Supple, No JVD  Respiratory: (+)bibasilar rales  Cardiovascular: reg s1s2, (+)1/6 LESA  Gastrointestinal: BS+, soft, NT/ND  Extremities: No peripheral edema b/l  Neurological: reduced generalized strength  Back: no CVAT b/l  Skin: (+)erythema b/l shins  LABS:                          8.5    1.24  )-----------( 170      ( 25 Nov 2023 06:49 )             25.6     Na(140)/K(4.5)/Cl(105)/HCO3(22)/BUN(42)/Cr(3.16)Glu(96)/Ca(8.5)/Mg(2.7)/PO4(4.9)    11-25 @ 06:47  Na(139)/K(4.3)/Cl(104)/HCO3(22)/BUN(47)/Cr(3.52)Glu(133)/Ca(8.4)/Mg(2.7)/PO4(5.2)    11-24 @ 07:02  Na(138)/K(4.1)/Cl(102)/HCO3(23)/BUN(46)/Cr(3.76)Glu(175)/Ca(8.5)/Mg(2.4)/PO4(4.7)    11-23 @ 09:16    Urinalysis Basic - ( 25 Nov 2023 06:47 )    Color: x / Appearance: x / SG: x / pH: x  Gluc: 96 mg/dL / Ketone: x  / Bili: x / Urobili: x   Blood: x / Protein: x / Nitrite: x   Leuk Esterase: x / RBC: x / WBC x   Sq Epi: x / Non Sq Epi: x / Bacteria: x        Imaging  ACC: [id number] EXAM:  US KIDNEYS AND BLADDER   ORDERED BY:  PARAMJIT NOGUEIRA     PROCEDURE DATE:  11/24/2023      INTERPRETATION:  CLINICAL INFORMATION: JUAN RAMON    COMPARISON: CT abdomen and pelvis 11/20/2023    TECHNIQUE: Sonography of the kidneys andbladder.    FINDINGS:  Right kidney: 6.2 cm. Atrophic. No renal mass, hydronephrosis or calculi.    Left kidney: 11.3 cm. No hydronephrosis or calculi. Upper pole cyst   measuring 2.3 x 2.3 x 2.3 cm.    Urinary bladder: Min catheter in place.    IMPRESSION:  Atrophic right kidney.  No hydronephrosis.        ACC: [id number] EXAM:  XR CHEST PORTABLE URGENT 1V   ORDERED BY:  PARAMJIT NOGUEIRA     PROCEDURE DATE:  11/24/2023          INTERPRETATION:  EXAMINATION: XR CHEST URGENT    CLINICAL INDICATION: Hypoxia    TECHNIQUE: Single frontal, portable view of the chest was obtained.    COMPARISON: Chest x-ray 11/20/2023.    FINDINGS:  Right PICC with tip overlying SVC.  The heart is normal in size.  Bibasilar patchy opacities, likely atelectasis and/or layering effusion.  Right lower mid lung field patchy opacity, likely subsegmental right   middle lobe atelectasis seen on CT.  There is no pneumothorax.  No acute bony abnormality.    IMPRESSION:  Bibasilar atelectasis and/or layering effusion.      ASSESSMENT/PLAN  IMPRESSION: 83M w/ HTN, gout, MDS, USHA, and CKD, 11/20/23 p/w hypoxia    (1)CKD - nonproteinuric CKD4 - largely due to renal artery stenosis; nephrolithiasis, hypertensive nephrosclerosis, and renal atheroembolic disease also may be playing roles here     (2)JUAN RAMON - scr improving  relative to yesterday    (3)Lytes - controlled at present    (4)Pancytopenia - MDS    (5)Hypoxia - cardiogenic pulmonary edema, it appears. Improving , with diuresis. on room air at present.  Potentially for eventual cardiac cath this admission? Would hold off until creatinine trends down below 2.5    (6)CV- BP acceptable at present    RECOMMEND:  (1)Continue  Lasix as prescribed for now  ( can change to the po route. he was taking 40mg qd  at home)  (2)BMP+Mg+PO4 daily while admitted  (3)Dose new meds for GFR 15-20ml/min  (4)No cardiac cath until creat trends down below 2.5 (unless urgently needed)          Moises Grant NP-BC  [facility]  [phone]

## 2023-11-25 NOTE — PROGRESS NOTE ADULT - PROBLEM SELECTOR PLAN 1
Patient presented with incidental, asymptomatic hypoxemia at hematology clinic, down to 88% on room air  - in the ED, patient noted to be persistently hypoxemic to 88% on 5L NC with tachypnea, requiring BIPAP  - CT chest without PE, small bilateral pleural effusions, mild pulmonary edema  - ProBNP elevated to 6700, higher in previous admission  - had episode of epigastric "lower chest" pain in ED with associated diaphoresis and respiratory distress, may be atypical presentation of cardiac ischemic pain, but since resolved  - TTE 1/2023: moderate AS, normal RV/LV function    Plan:  > hold lasix due to JUAN RAMON. Pt euvolemic  > TTE 11/21 EF 40%, moderate LV diastolic dysfunction, basal and mid anterolateral wall, basal and mid inferior wall, basal and mid inferolateral wall, and basal anteroseptal segment are abnormal, moderate AS  > cards consulted for ischemic eval for newly-reduced EF with new wall motional abnl and atypical angina  > increase home metoprolol to 150 mg ER qD given significant ectopy (PAC, PVCs, Vtach) on tele  > incentive spirometer given atelectasis on CT  > wean off supplemental O2 as tolerated Patient presented with incidental, asymptomatic hypoxemia at hematology clinic, down to 88% on room air  - in the ED, patient noted to be persistently hypoxemic to 88% on 5L NC with tachypnea, requiring BIPAP  - CT chest without PE, small bilateral pleural effusions, mild pulmonary edema  - ProBNP elevated to 6700, higher in previous admission  - had episode of epigastric "lower chest" pain in ED with associated diaphoresis and respiratory distress, may be atypical presentation of cardiac ischemic pain, but since resolved  - TTE 1/2023: moderate AS, normal RV/LV function    Plan:  > CXR 11/24 with bibasilar atelectasis vs layering effusion. Pt appears euvolemic and is now on room air. Resumed lasix 40 mg IV qD today in setting of improving JUAN RAMON, will transition to lasix 40 mg PO qD tomorrow  > TTE 11/21 EF 40%, moderate LV diastolic dysfunction, basal and mid anterolateral wall, basal and mid inferior wall, basal and mid inferolateral wall, and basal anteroseptal segment are abnormal, moderate AS  > cards consulted for ischemic eval for newly-reduced EF with new wall motional abnl and atypical angina. Plan for possible LHC, pending improvement in JUAN RAMON  > continue metoprolol 150 mg ER qD  > incentive spirometer given atelectasis on CT  > wean off supplemental O2 as tolerated

## 2023-11-25 NOTE — PROVIDER CONTACT NOTE (CHANGE IN STATUS NOTIFICATION) - ACTION/TREATMENT ORDERED:
MD examined patient at bedside, no new orders at this time
Provider notified, provider aware of Pt BP, keep monitoring pt and will notify provider if pt sustains as ordered.

## 2023-11-25 NOTE — PROVIDER CONTACT NOTE (CHANGE IN STATUS NOTIFICATION) - BACKGROUND
patient admitted for acute respiratory failure
Pt with PMH of atrial tachycardia, admitted for acute hypoxemic respiratory failure likely 2/2 to pulmonary edema from acute heart failure

## 2023-11-25 NOTE — PROGRESS NOTE ADULT - PROBLEM SELECTOR PLAN 3
Patient follows with Dr. Zita Mcmahon at [facility]  - was previously on lenolidamide but held given pancytopenia. Last chemo on 10/30 with decitabine   - patient currently pancytopenic. but appears to be near baseline labs per outpt review  - receiving procrit injections with improvement in pancytopenia    Plan:  > continue with acyclovir for ppx  > fluconazole ppx  > hematology on board, appreciate recs  > maintain Hgb>8 given history of CAD. s/p 1 unit of pRBC Patient follows with Dr. Zita Mcmahon at [facility]  - was previously on lenolidamide but held given pancytopenia. Last chemo on 10/30 with decitabine   - patient currently pancytopenic. but appears to be near baseline labs per outpt review  - receiving procrit injections with improvement in pancytopenia    Plan:  > continue with acyclovir for ppx  > holding fluconazole due to prolonged QTC  > hematology on board, appreciate recs  > maintain Hgb>8 given history of CAD. s/p 1 unit of pRBC

## 2023-11-25 NOTE — PROGRESS NOTE ADULT - PROBLEM SELECTOR PLAN 6
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 Cabral (private nephro)  - FeUrea 58.3%, c/w intrinsic kidney disease    Plan:  > improving  > transition to lasix 40 mg PO qD  in AM  > kidney and bladder US: atrophic right kidney, no hydronephrosis  > monitor urine output  > avoid nephrotoxic meds

## 2023-11-25 NOTE — PROGRESS NOTE ADULT - PROBLEM SELECTOR PLAN 5
Patient with known atrial tachycardia, diagnosed during admission 1/2023   - patient followed by Dr. Reddy for cardiology, and Dr. Feldman for EP  - recently had event monitor outpatient revealing significant runs of supraventricular tachycardia, though asymptomatic during these events  - restarted on BB recently by EP    Plan:  - increase metoprolol succinate to 150 mg qD  - EKG today: frequent PACs Patient with known atrial tachycardia, diagnosed during admission 1/2023   - patient followed by Dr. Reddy for cardiology, and Dr. Feldman for EP  - recently had event monitor outpatient revealing significant runs of supraventricular tachycardia, though asymptomatic during these events  - restarted on BB recently by EP    Plan:  - improved  - continue metoprolol succinate 150 mg qD

## 2023-11-25 NOTE — PROGRESS NOTE ADULT - ASSESSMENT
Patient is an 83 year old M with gout, myelodysplastic syndrome, coronary artery disease, carotid artery stenosis, hypertension, hyperlipidemia, chronic kidney disease stage III, renal artery stenosis (s/p left renal artery stent), spinal stenosis, and atrial tachycardia who presents for hypoxia noted at his hematology clinic. Patient admitted for acute hypoxemic respiratory failure likely 2/2 to pulmonary edema from acute heart failure.  Patient is an 83 year old M with gout, myelodysplastic syndrome, coronary artery disease, carotid artery stenosis, hypertension, hyperlipidemia, chronic kidney disease stage III, renal artery stenosis (s/p left renal artery stent), spinal stenosis, and atrial tachycardia who presents for hypoxia noted at his hematology clinic. Patient admitted for acute hypoxemic respiratory failure likely 2/2 to pulmonary edema from acute heart failure. Course c/b JUAN RAMON

## 2023-11-26 LAB
ALBUMIN SERPL ELPH-MCNC: 3.7 G/DL — SIGNIFICANT CHANGE UP (ref 3.3–5)
ALBUMIN SERPL ELPH-MCNC: 3.7 G/DL — SIGNIFICANT CHANGE UP (ref 3.3–5)
ALP SERPL-CCNC: 86 U/L — SIGNIFICANT CHANGE UP (ref 40–120)
ALP SERPL-CCNC: 86 U/L — SIGNIFICANT CHANGE UP (ref 40–120)
ALT FLD-CCNC: 7 U/L — LOW (ref 10–45)
ALT FLD-CCNC: 7 U/L — LOW (ref 10–45)
ANION GAP SERPL CALC-SCNC: 14 MMOL/L — SIGNIFICANT CHANGE UP (ref 5–17)
ANION GAP SERPL CALC-SCNC: 14 MMOL/L — SIGNIFICANT CHANGE UP (ref 5–17)
AST SERPL-CCNC: 13 U/L — SIGNIFICANT CHANGE UP (ref 10–40)
AST SERPL-CCNC: 13 U/L — SIGNIFICANT CHANGE UP (ref 10–40)
BILIRUB SERPL-MCNC: 0.4 MG/DL — SIGNIFICANT CHANGE UP (ref 0.2–1.2)
BILIRUB SERPL-MCNC: 0.4 MG/DL — SIGNIFICANT CHANGE UP (ref 0.2–1.2)
BUN SERPL-MCNC: 43 MG/DL — HIGH (ref 7–23)
BUN SERPL-MCNC: 43 MG/DL — HIGH (ref 7–23)
CALCIUM SERPL-MCNC: 8.1 MG/DL — LOW (ref 8.4–10.5)
CALCIUM SERPL-MCNC: 8.1 MG/DL — LOW (ref 8.4–10.5)
CHLORIDE SERPL-SCNC: 105 MMOL/L — SIGNIFICANT CHANGE UP (ref 96–108)
CHLORIDE SERPL-SCNC: 105 MMOL/L — SIGNIFICANT CHANGE UP (ref 96–108)
CO2 SERPL-SCNC: 20 MMOL/L — LOW (ref 22–31)
CO2 SERPL-SCNC: 20 MMOL/L — LOW (ref 22–31)
CREAT SERPL-MCNC: 2.67 MG/DL — HIGH (ref 0.5–1.3)
CREAT SERPL-MCNC: 2.67 MG/DL — HIGH (ref 0.5–1.3)
CULTURE RESULTS: SIGNIFICANT CHANGE UP
EGFR: 23 ML/MIN/1.73M2 — LOW
EGFR: 23 ML/MIN/1.73M2 — LOW
GLUCOSE SERPL-MCNC: 171 MG/DL — HIGH (ref 70–99)
GLUCOSE SERPL-MCNC: 171 MG/DL — HIGH (ref 70–99)
HCT VFR BLD CALC: 26.4 % — LOW (ref 39–50)
HCT VFR BLD CALC: 26.4 % — LOW (ref 39–50)
HGB BLD-MCNC: 8.8 G/DL — LOW (ref 13–17)
HGB BLD-MCNC: 8.8 G/DL — LOW (ref 13–17)
MAGNESIUM SERPL-MCNC: 2.5 MG/DL — SIGNIFICANT CHANGE UP (ref 1.6–2.6)
MAGNESIUM SERPL-MCNC: 2.5 MG/DL — SIGNIFICANT CHANGE UP (ref 1.6–2.6)
MCHC RBC-ENTMCNC: 33.3 GM/DL — SIGNIFICANT CHANGE UP (ref 32–36)
MCHC RBC-ENTMCNC: 33.3 GM/DL — SIGNIFICANT CHANGE UP (ref 32–36)
MCHC RBC-ENTMCNC: 37.8 PG — HIGH (ref 27–34)
MCHC RBC-ENTMCNC: 37.8 PG — HIGH (ref 27–34)
MCV RBC AUTO: 113.3 FL — HIGH (ref 80–100)
MCV RBC AUTO: 113.3 FL — HIGH (ref 80–100)
NRBC # BLD: 0 /100 WBCS — SIGNIFICANT CHANGE UP (ref 0–0)
NRBC # BLD: 0 /100 WBCS — SIGNIFICANT CHANGE UP (ref 0–0)
PHOSPHATE SERPL-MCNC: 5.1 MG/DL — HIGH (ref 2.5–4.5)
PHOSPHATE SERPL-MCNC: 5.1 MG/DL — HIGH (ref 2.5–4.5)
PLATELET # BLD AUTO: 195 K/UL — SIGNIFICANT CHANGE UP (ref 150–400)
PLATELET # BLD AUTO: 195 K/UL — SIGNIFICANT CHANGE UP (ref 150–400)
POTASSIUM SERPL-MCNC: 3.9 MMOL/L — SIGNIFICANT CHANGE UP (ref 3.5–5.3)
POTASSIUM SERPL-MCNC: 3.9 MMOL/L — SIGNIFICANT CHANGE UP (ref 3.5–5.3)
POTASSIUM SERPL-SCNC: 3.9 MMOL/L — SIGNIFICANT CHANGE UP (ref 3.5–5.3)
POTASSIUM SERPL-SCNC: 3.9 MMOL/L — SIGNIFICANT CHANGE UP (ref 3.5–5.3)
PROT SERPL-MCNC: 6.3 G/DL — SIGNIFICANT CHANGE UP (ref 6–8.3)
PROT SERPL-MCNC: 6.3 G/DL — SIGNIFICANT CHANGE UP (ref 6–8.3)
RBC # BLD: 2.33 M/UL — LOW (ref 4.2–5.8)
RBC # BLD: 2.33 M/UL — LOW (ref 4.2–5.8)
RBC # FLD: 23.7 % — HIGH (ref 10.3–14.5)
RBC # FLD: 23.7 % — HIGH (ref 10.3–14.5)
SODIUM SERPL-SCNC: 139 MMOL/L — SIGNIFICANT CHANGE UP (ref 135–145)
SODIUM SERPL-SCNC: 139 MMOL/L — SIGNIFICANT CHANGE UP (ref 135–145)
SPECIMEN SOURCE: SIGNIFICANT CHANGE UP
WBC # BLD: 1.48 K/UL — LOW (ref 3.8–10.5)
WBC # BLD: 1.48 K/UL — LOW (ref 3.8–10.5)
WBC # FLD AUTO: 1.48 K/UL — LOW (ref 3.8–10.5)
WBC # FLD AUTO: 1.48 K/UL — LOW (ref 3.8–10.5)

## 2023-11-26 PROCEDURE — 99233 SBSQ HOSP IP/OBS HIGH 50: CPT

## 2023-11-26 RX ADMIN — CYCLOBENZAPRINE HYDROCHLORIDE 5 MILLIGRAM(S): 10 TABLET, FILM COATED ORAL at 22:41

## 2023-11-26 RX ADMIN — Medication 40 MILLIGRAM(S): at 05:07

## 2023-11-26 RX ADMIN — Medication 150 MILLIGRAM(S): at 05:07

## 2023-11-26 RX ADMIN — ATORVASTATIN CALCIUM 80 MILLIGRAM(S): 80 TABLET, FILM COATED ORAL at 22:41

## 2023-11-26 RX ADMIN — GABAPENTIN 300 MILLIGRAM(S): 400 CAPSULE ORAL at 12:06

## 2023-11-26 RX ADMIN — SERTRALINE 25 MILLIGRAM(S): 25 TABLET, FILM COATED ORAL at 12:06

## 2023-11-26 RX ADMIN — Medication 81 MILLIGRAM(S): at 12:05

## 2023-11-26 RX ADMIN — PANTOPRAZOLE SODIUM 40 MILLIGRAM(S): 20 TABLET, DELAYED RELEASE ORAL at 05:07

## 2023-11-26 RX ADMIN — Medication 200 MILLIGRAM(S): at 05:06

## 2023-11-26 RX ADMIN — CHLORHEXIDINE GLUCONATE 1 APPLICATION(S): 213 SOLUTION TOPICAL at 12:06

## 2023-11-26 RX ADMIN — CLOPIDOGREL BISULFATE 75 MILLIGRAM(S): 75 TABLET, FILM COATED ORAL at 12:06

## 2023-11-26 RX ADMIN — Medication 200 MILLIGRAM(S): at 18:05

## 2023-11-26 NOTE — PROGRESS NOTE ADULT - PROBLEM SELECTOR PLAN 6
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 Cabral (private nephro)  - FeUrea 58.3%, c/w intrinsic kidney disease    Plan:  > improving  > transition to lasix 40 mg PO qD  in AM  > kidney and bladder US: atrophic right kidney, no hydronephrosis  > monitor urine output  > avoid nephrotoxic meds Patient with known atrial tachycardia, diagnosed during admission 1/2023   - patient followed by Dr. Reddy for cardiology, and Dr. Feldman for EP  - recently had event monitor outpatient revealing significant runs of supraventricular tachycardia, though asymptomatic during these events  - restarted on BB recently by EP    Plan:  - improved  - continue metoprolol succinate 150 mg qD

## 2023-11-26 NOTE — PROGRESS NOTE ADULT - PROBLEM SELECTOR PLAN 5
Patient with known atrial tachycardia, diagnosed during admission 1/2023   - patient followed by Dr. Reddy for cardiology, and Dr. Feldman for EP  - recently had event monitor outpatient revealing significant runs of supraventricular tachycardia, though asymptomatic during these events  - restarted on BB recently by EP    Plan:  - improved  - continue metoprolol succinate 150 mg qD Patient found to have prolonged QTc to 505  - may be falsely elevated in the setting of tachycardia with significant ectopic beats  - on fluconazole at home    Plan:  > QTc 517 11/23. hold home fluconazole  > Maintain K>4, Mg >2  > avoid QTc prolonging meds

## 2023-11-26 NOTE — PROGRESS NOTE ADULT - PROBLEM SELECTOR PLAN 2
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 Cabral (private nephro)  - FeUrea 58.3%, c/w intrinsic kidney disease    Plan:  > improving. Cr 3.76-->3.52-->3.16-->2.67  > transition to lasix 40 mg PO qD 11/26  > kidney and bladder US: atrophic right kidney, no hydronephrosis  > monitor urine output  > avoid nephrotoxic meds

## 2023-11-26 NOTE — PROGRESS NOTE ADULT - PROBLEM SELECTOR PLAN 1
Patient presented with incidental, asymptomatic hypoxemia at hematology clinic, down to 88% on room air  - in the ED, patient noted to be persistently hypoxemic to 88% on 5L NC with tachypnea, requiring BIPAP  - CT chest without PE, small bilateral pleural effusions, mild pulmonary edema  - ProBNP elevated to 6700, higher in previous admission  - had episode of epigastric "lower chest" pain in ED with associated diaphoresis and respiratory distress, may be atypical presentation of cardiac ischemic pain, but since resolved  - TTE 1/2023: moderate AS, normal RV/LV function    Plan:  > CXR 11/24 with bibasilar atelectasis vs layering effusion. Pt appears euvolemic and is now on room air. Resumed lasix 40 mg IV qD today in setting of improving JUAN RAMON, will transition to lasix 40 mg PO qD tomorrow  > TTE 11/21 EF 40%, moderate LV diastolic dysfunction, basal and mid anterolateral wall, basal and mid inferior wall, basal and mid inferolateral wall, and basal anteroseptal segment are abnormal, moderate AS  > cards consulted for ischemic eval for newly-reduced EF with new wall motional abnl and atypical angina. Plan for possible LHC, pending improvement in JUAN RAMON  > continue metoprolol 150 mg ER qD  > incentive spirometer given atelectasis on CT  > wean off supplemental O2 as tolerated Patient presented with incidental, asymptomatic hypoxemia at hematology clinic, down to 88% on room air  - In the ED, patient noted to be persistently hypoxemic to 88% on 5L NC with tachypnea, requiring BIPAP  - CT chest without PE, small bilateral pleural effusions, mild pulmonary edema  - ProBNP elevated to 6700, higher in previous admission  - Had episode of epigastric "lower chest" pain in ED with associated diaphoresis and respiratory distress, may be atypical presentation of cardiac ischemic pain, but since resolved  - TTE 1/2023: moderate AS, normal RV/LV function    Plan:  > CXR 11/24 with bibasilar atelectasis vs layering effusion. Pt appears euvolemic and is now on room air. Resumed lasix 40 mg IV qD 11/25 in setting of improving JUAN RAMON, will transition to lasix 40 mg PO 11/26  > TTE 11/21 EF 40%, moderate LV diastolic dysfunction, basal and mid anterolateral wall, basal and mid inferior wall, basal and mid inferolateral wall, and basal anteroseptal segment are abnormal, moderate AS  > cards consulted for ischemic eval for newly-reduced EF with new wall motional abnl and atypical angina. Plan for possible LHC, pending improvement in JUAN RAMON  > continue metoprolol 150 mg ER qD. Continue DAPT  > incentive spirometer given atelectasis on CT  - Now off of O2

## 2023-11-26 NOTE — PROGRESS NOTE ADULT - PROBLEM SELECTOR PLAN 4
Patient found to have prolonged QTc to 505  - may be falsely elevated in the setting of tachycardia with significant ectopic beats  - on fluconazole at home    Plan:  > QTc 517 11/23. hold home fluconazole  > Maintain K>4, Mg >2  > avoid QTc prolonging meds - Troponins elevated, downtrending  - Likely due to Type 2 MI from demand ischemia in the setting of hypoxia and reduced clearance in the setting of CKD  - Need for cath as above

## 2023-11-26 NOTE — PROGRESS NOTE ADULT - PROBLEM SELECTOR PLAN 9
Patient on home amlodipine, metoprolol    Plan:  > continue with home meds Diet: Dash  DVT Proph: Dapt + SCD  Dispo: Pending course

## 2023-11-26 NOTE — PROGRESS NOTE ADULT - PROBLEM SELECTOR PLAN 7
Patient with symptoms suggestive of claudication  - blisters may be sequelae of PAD  - severe functional limitation    Plan:  > hold home cilostazol due to ADHF

## 2023-11-26 NOTE — PROGRESS NOTE ADULT - PROBLEM SELECTOR PROBLEM 4
Discharge Summary  Beverley Schroeder 2 m o  female MRN: [id number]  Unit/Bed#: [facility] 125-43 Encounter: [id number]      Admit date:  Discharge date:    Diagnosis: BRUE, GERD       Disposition: Pt is medically stable and able to home with parents today  Follow up at outpt  Procedures Performed: CXR  Complications: None  Consultations: None  Pending Labs: Resp pathogen Profile    Hospital Course:  Patient presented to the ED last night due to change in color and difficulty breathing, ordered CMP UA respiratory pathogen profile CBC with diff, performed CXR, transferred to [facility]  It was found that the family was feeding patient 6 8 oz of formula every 3-4 hours  The amount of each feed was decreased to 3-4 oz, patient was placed on continuous cardiac monitoring, continuous pulse ox, and reflux precautions  Also monitored I/O's, weight, patient was also scheduled for Zantac 12 mg t i d  patient did not have any additional BRUE episodes, spO2's wnl on room air, no indications of hypoxia or cyanosis  Joaquin Encarnacioncassidy most likely secondary to overfeeding  Educated family on formula feeding, advice to feed 3-4 oz every 3 hours  Advised to follow up with PCP within 3-5 days advised to return to ED if any indications of increased work of breathing, SOB, cyanosis  Physical Exam:    Temp:  [98 °F (36 7 °C)-98 4 °F (36 9 °C)] 98 3 °F (36 8 °C)  HR:  [100-184] 100  Resp:  [30-48] 42  BP: (107-124)/(55-67) 114/55  Gen  : Well-appearing child, no acute distress  Head: Normocephalic  Eyes: PERRLA, red reflex b/l, no conjunctival injection  Ears: Tympanic membranes gray bilaterally, normal light reflex b/l, ear canals normal  Mouth: Mucous membranes moist, no lesions  Throat: No lesions, no erythema  Heart: Regular rate and rhythm, no murmurs, rubs, or gallops  Lungs: Clear to auscultation bilaterally, no wheezing, rales, or rhonchi, no accessory muscle use  Abdomen: Soft, nontender, nondistended, bowel sounds positive  Extremities: Warm and well perfused ×4, cap refill less than 2 seconds  Skin: No rashes  Neuro: Awake, alert, and active    Labs:  Recent Results (from the past 24 hour(s))   CBC and differential    Collection Time: 08/19/18  8:42 PM   Result Value Ref Range    WBC 6 80 5 50 - 18 00 Thousand/uL    RBC 3 64 2 70 - 4 90 Million/uL    Hemoglobin 11 0 9 0 - 14 0 g/dL    Hematocrit 32 8 28 0 - 42 0 %    MCV 90 77 - 115 fL    MCH 30 3 23 0 - 35 0 pg    MCHC 33 6 29 0 - 36 0 g/dL    RDW 14 0 <15 3 %    MPV 7 6 (L) 8 9 - 12 7 fL    Platelets 580 965 - 966 Thousands/uL   Comprehensive metabolic panel    Collection Time: 08/19/18  8:42 PM   Result Value Ref Range    Sodium 135 132 - 142 mmol/L    Potassium 5 1 3 4 - 6 0 mmol/L    Chloride 105 95 - 105 mmol/L    CO2 22 18 - 27 mmol/L    Anion Gap 8 5 - 14 mmol/L    BUN 8 5 - 23 mg/dL    Creatinine 0 16 (L) 0 20 - 0 40 mg/dL    Glucose 93 55 - 117 mg/dL    Calcium 10 1 (H) 8 7 - 9 8 mg/dL    AST 62 (H) 14 - 36 U/L    ALT 27 9 - 52 U/L    Alkaline Phosphatase 246 70 - 250 U/L    Total Protein 6 4 5 9 - 8 4 g/dL    Albumin 3 7 3 0 - 5 2 g/dL    Total Bilirubin 0 70 <1 30 mg/dL    eGFR  >60 ml/min/1 73sq m   Manual Differential (Non Wam)    Collection Time: 08/19/18  8:42 PM   Result Value Ref Range    Segmented % 20 (L) 45 - 65 %    Lymphocytes % 54 (H) 20 - 50 %    Monocytes % 5 1 - 10 %    Eosinophils % 4 0 - 6 %    Basophils % 1 0 - 1 %    Atypical Lymphocytes % 16 (H) 0 - 0 %    Neutrophils Absolute 1 36 (L) 1 80 - 7 80 Thousand/uL    Lymphocytes Absolute 3 67 0 50 - 4 00 Thousand/uL    Monocytes Absolute 0 34 0 20 - 0 90 Thousand/uL    Eosinophils Absolute 0 27 0 00 - 0 40 Thousand/uL    Basophils Absolute 0 07 0 00 - 0 10 Thousand/uL    Total Counted 100     RBC Morphology Present     Anisocytosis Present     Hypochromia Present     Poikilocytes Present     Platelet Estimate Adequate Adequate   UA w Reflex to Microscopic    Collection Time: 08/19/18 10:33 PM   Result Value Ref Range Color, UA Straw Straw, Yellow, Pale Yellow    Clarity, UA Clear Clear, Other    Specific [place], UA 1 010 1 003 - 1 040    pH, UA 7 0 4 5 - 8 0    Leukocytes,  0 (A) Negative    Nitrite, UA Negative Negative    Protein, UA 15 (Trace) (A) Negative mg/dl    Glucose, UA Negative Negative mg/dl    Ketones, UA Negative Negative mg/dl    Bilirubin, UA Negative Negative    Blood, UA Negative Negative    UROBILINOGEN UA Negative 1 0, Negative mg/dL   Urine Microscopic    Collection Time: 08/19/18 10:33 PM   Result Value Ref Range    RBC, UA None Seen None Seen, 0-5 /hpf    WBC, UA None Seen None Seen, 0-5, 5-55, 5-65 /hpf    Epithelial Cells Occasional None Seen, Occasional /hpf    Bacteria, UA None Seen None Seen, Occasional /hpf       Discharge instructions/Information to patient and family:   See after visit summary for information provided to patient and family  Discharge Statement   I spent  30 minutes discharging the patient  This time was spent on the day of discharge  I had direct contact with the patient on the day of discharge  Additional documentation is required if more than 30 minutes were spent on discharge  Discharge Medications:  See after visit summary for reconciled discharge medications provided to patient and family        Yina Chavez MD  [facility] PGY1  2018  9:42 AM Prolonged QT interval Elevated troponin level

## 2023-11-26 NOTE — PROGRESS NOTE ADULT - ASSESSMENT
Patient is an 83 year old M with gout, myelodysplastic syndrome, coronary artery disease, carotid artery stenosis, hypertension, hyperlipidemia, chronic kidney disease stage III, renal artery stenosis (s/p left renal artery stent), spinal stenosis, and atrial tachycardia who presents for hypoxia noted at his hematology clinic. Patient admitted for acute hypoxemic respiratory failure likely 2/2 to pulmonary edema from acute heart failure. Course c/b JUAN RAMON Patient is an 83 year old M with gout, myelodysplastic syndrome, coronary artery disease, carotid artery stenosis, hypertension, hyperlipidemia, chronic kidney disease stage III, renal artery stenosis (s/p left renal artery stent), spinal stenosis, and atrial tachycardia who presents for hypoxia noted at his hematology clinic. Patient admitted for acute hypoxemic respiratory failure likely 2/2 to pulmonary edema from new acute heart failure with EF 40%. Transitioned from IV to PO lasix and Cr decreasing.

## 2023-11-26 NOTE — PROGRESS NOTE ADULT - SUBJECTIVE AND OBJECTIVE BOX
PROGRESS NOTE:   Authored by: Andi Hernandez M.D.   Internal Medicine PGY-2  Please Contact Via Teams    Patient is a 83y old  Male who presents with a chief complaint of acute hypoxic respiratory failure (25 Nov 2023 10:46)      SUBJECTIVE / OVERNIGHT EVENTS:  No acute events overnight.     Tele:    Brief Daily Plan:    MEDICATIONS  (STANDING):  acyclovir   Oral Tab/Cap 200 milliGRAM(s) Oral every 12 hours  aspirin enteric coated 81 milliGRAM(s) Oral daily  atorvastatin 80 milliGRAM(s) Oral at bedtime  chlorhexidine 2% Cloths 1 Application(s) Topical daily  clopidogrel Tablet 75 milliGRAM(s) Oral daily  furosemide    Tablet 40 milliGRAM(s) Oral daily  gabapentin 300 milliGRAM(s) Oral daily  metoprolol succinate  milliGRAM(s) Oral daily  pantoprazole    Tablet 40 milliGRAM(s) Oral before breakfast  sertraline 25 milliGRAM(s) Oral daily    MEDICATIONS  (PRN):  aluminum hydroxide/magnesium hydroxide/simethicone Suspension 30 milliLiter(s) Oral every 6 hours PRN Dyspepsia  cyclobenzaprine 5 milliGRAM(s) Oral three times a day PRN Muscle Spasm      CAPILLARY BLOOD GLUCOSE        I&O's Summary    25 Nov 2023 07:01  -  26 Nov 2023 07:00  --------------------------------------------------------  IN: 860 mL / OUT: 150 mL / NET: 710 mL        PHYSICAL EXAM:  Vital Signs Last 24 Hrs  T(C): 36.5 (26 Nov 2023 04:33), Max: 36.6 (25 Nov 2023 11:52)  T(F): 97.7 (26 Nov 2023 04:33), Max: 97.9 (25 Nov 2023 11:52)  HR: 71 (26 Nov 2023 04:33) (53 - 72)  BP: 144/68 (26 Nov 2023 04:33) (108/65 - 150/68)  BP(mean): --  RR: 18 (26 Nov 2023 04:33) (18 - 18)  SpO2: 95% (26 Nov 2023 04:33) (95% - 97%)    Parameters below as of 26 Nov 2023 04:33  Patient On (Oxygen Delivery Method): room air        CONSTITUTIONAL: NAD, well-developed  RESPIRATORY: Normal respiratory effort; lungs are clear to auscultation bilaterally  CARDIOVASCULAR: Regular rate and rhythm, normal S1 and S2, no murmur/rub/gallop; No lower extremity edema; Peripheral pulses are 2+ bilaterally  ABDOMEN: Nontender to palpation, normoactive bowel sounds, no rebound/guarding; No hepatosplenomegaly  MUSCLOSKELETAL: no clubbing or cyanosis of digits; no joint swelling or tenderness to palpation  PSYCH: A+O to person, place, and time; affect appropriate    LABS:                        8.8    1.48  )-----------( 195      ( 26 Nov 2023 05:49 )             26.4     11-26    139  |  105  |  43<H>  ----------------------------<  171<H>  3.9   |  20<L>  |  2.67<H>    Ca    8.1<L>      26 Nov 2023 05:48  Phos  5.1     11-26  Mg     2.5     11-26    TPro  6.3  /  Alb  3.7  /  TBili  0.4  /  DBili  x   /  AST  13  /  ALT  7<L>  /  AlkPhos  86  11-26            MICRO:  Urinalysis Basic - ( 26 Nov 2023 05:48 )    Color: x / Appearance: x / SG: x / pH: x  Gluc: 171 mg/dL / Ketone: x  / Bili: x / Urobili: x   Blood: x / Protein: x / Nitrite: x   Leuk Esterase: x / RBC: x / WBC x   Sq Epi: x / Non Sq Epi: x / Bacteria: x          IMAGING: PROGRESS NOTE:   Authored by: Andi Hernandez M.D.   Internal Medicine PGY-2  Please Contact Via Teams    Patient is a 83y old  Male who presents with a chief complaint of acute hypoxic respiratory failure (25 Nov 2023 10:46)      SUBJECTIVE / OVERNIGHT EVENTS:  No acute events overnight. Pt feeling well this am with no complaints. Able to lay flat. Cr decreasing    Brief Daily Plan:  - F/u Cr  - Cards f/u    MEDICATIONS  (STANDING):  acyclovir   Oral Tab/Cap 200 milliGRAM(s) Oral every 12 hours  aspirin enteric coated 81 milliGRAM(s) Oral daily  atorvastatin 80 milliGRAM(s) Oral at bedtime  chlorhexidine 2% Cloths 1 Application(s) Topical daily  clopidogrel Tablet 75 milliGRAM(s) Oral daily  furosemide    Tablet 40 milliGRAM(s) Oral daily  gabapentin 300 milliGRAM(s) Oral daily  metoprolol succinate  milliGRAM(s) Oral daily  pantoprazole    Tablet 40 milliGRAM(s) Oral before breakfast  sertraline 25 milliGRAM(s) Oral daily    MEDICATIONS  (PRN):  aluminum hydroxide/magnesium hydroxide/simethicone Suspension 30 milliLiter(s) Oral every 6 hours PRN Dyspepsia  cyclobenzaprine 5 milliGRAM(s) Oral three times a day PRN Muscle Spasm      CAPILLARY BLOOD GLUCOSE        I&O's Summary    25 Nov 2023 07:01  -  26 Nov 2023 07:00  --------------------------------------------------------  IN: 860 mL / OUT: 150 mL / NET: 710 mL        PHYSICAL EXAM:  Vital Signs Last 24 Hrs  T(C): 36.5 (26 Nov 2023 04:33), Max: 36.6 (25 Nov 2023 11:52)  T(F): 97.7 (26 Nov 2023 04:33), Max: 97.9 (25 Nov 2023 11:52)  HR: 71 (26 Nov 2023 04:33) (53 - 72)  BP: 144/68 (26 Nov 2023 04:33) (108/65 - 150/68)  BP(mean): --  RR: 18 (26 Nov 2023 04:33) (18 - 18)  SpO2: 95% (26 Nov 2023 04:33) (95% - 97%)    Parameters below as of 26 Nov 2023 04:33  Patient On (Oxygen Delivery Method): room air        CONSTITUTIONAL: NAD, well-developed  RESPIRATORY: Normal respiratory effort; lungs are clear to auscultation bilaterally  CARDIOVASCULAR: Regular rate and rhythm, normal S1 and S2, no murmurs. No LE edema  ABDOMEN: Large, soft, nontender  PSYCH: A+O to person, place, and time; affect appropriate    LABS:                        8.8    1.48  )-----------( 195      ( 26 Nov 2023 05:49 )             26.4     11-26    139  |  105  |  43<H>  ----------------------------<  171<H>  3.9   |  20<L>  |  2.67<H>    Ca    8.1<L>      26 Nov 2023 05:48  Phos  5.1     11-26  Mg     2.5     11-26    TPro  6.3  /  Alb  3.7  /  TBili  0.4  /  DBili  x   /  AST  13  /  ALT  7<L>  /  AlkPhos  86  11-26            MICRO:  Urinalysis Basic - ( 26 Nov 2023 05:48 )    Color: x / Appearance: x / SG: x / pH: x  Gluc: 171 mg/dL / Ketone: x  / Bili: x / Urobili: x   Blood: x / Protein: x / Nitrite: x   Leuk Esterase: x / RBC: x / WBC x   Sq Epi: x / Non Sq Epi: x / Bacteria: x

## 2023-11-26 NOTE — PROGRESS NOTE ADULT - PROBLEM SELECTOR PLAN 3
Patient follows with Dr. Zita Mcmahon at [facility]  - was previously on lenolidamide but held given pancytopenia. Last chemo on 10/30 with decitabine   - patient currently pancytopenic. but appears to be near baseline labs per outpt review  - receiving procrit injections with improvement in pancytopenia    Plan:  > continue with acyclovir for ppx  > holding fluconazole due to prolonged QTC  > hematology on board, appreciate recs  > maintain Hgb>8 given history of CAD. s/p 1 unit of pRBC

## 2023-11-26 NOTE — PROGRESS NOTE ADULT - ASSESSMENT
on room air   afebrile  denies complaints    acyclovir   Oral Tab/Cap 200 milliGRAM(s) Oral every 12 hours  aluminum hydroxide/magnesium hydroxide/simethicone Suspension 30 milliLiter(s) Oral every 6 hours PRN  aspirin enteric coated 81 milliGRAM(s) Oral daily  atorvastatin 80 milliGRAM(s) Oral at bedtime  chlorhexidine 2% Cloths 1 Application(s) Topical daily  clopidogrel Tablet 75 milliGRAM(s) Oral daily  cyclobenzaprine 5 milliGRAM(s) Oral three times a day PRN  furosemide    Tablet 40 milliGRAM(s) Oral daily  gabapentin 300 milliGRAM(s) Oral daily  metoprolol succinate  milliGRAM(s) Oral daily  pantoprazole    Tablet 40 milliGRAM(s) Oral before breakfast  sertraline 25 milliGRAM(s) Oral daily      VITAL:  T(C): , Max: 36.6 (11-25-23 @ 11:52)  T(F): , Max: 97.9 (11-25-23 @ 11:52)  HR: 71 (11-26-23 @ 04:33)  BP: 144/68 (11-26-23 @ 04:33)  BP(mean): --  RR: 18 (11-26-23 @ 04:33)  SpO2: 95% (11-26-23 @ 04:33)  Wt(kg): --    11-25-23 @ 07:01  -  11-26-23 @ 07:00  --------------------------------------------------------  IN: 860 mL / OUT: 150 mL / NET: 710 mL        PHYSICAL EXAM:  Constitutional: NAD   HEENT: NCAT, DMM  Neck: Supple, No JVD  Respiratory: (+)bibasilar rales  Cardiovascular: reg s1s2, (+)1/6 LESA  Gastrointestinal: BS+, soft, NT/ND  Extremities: No peripheral edema b/l  Neurological: reduced generalized strength  Back: no CVAT b/l  Skin: (+)erythema b/l shins  LABS:                          8.8    1.48  )-----------( 195      ( 26 Nov 2023 05:49 )             26.4     Na(139)/K(3.9)/Cl(105)/HCO3(20)/BUN(43)/Cr(2.67)Glu(171)/Ca(8.1)/Mg(2.5)/PO4(5.1)    11-26 @ 05:48  Na(140)/K(4.5)/Cl(105)/HCO3(22)/BUN(42)/Cr(3.16)Glu(96)/Ca(8.5)/Mg(2.7)/PO4(4.9)    11-25 @ 06:47  Na(139)/K(4.3)/Cl(104)/HCO3(22)/BUN(47)/Cr(3.52)Glu(133)/Ca(8.4)/Mg(2.7)/PO4(5.2)    11-24 @ 07:02    Urinalysis Basic - ( 26 Nov 2023 05:48 )    Color: x / Appearance: x / SG: x / pH: x  Gluc: 171 mg/dL / Ketone: x  / Bili: x / Urobili: x   Blood: x / Protein: x / Nitrite: x   Leuk Esterase: x / RBC: x / WBC x   Sq Epi: x / Non Sq Epi: x / Bacteria: x            ASSESSMENT/PLAN  IMPRESSION: 83M w/ HTN, gout, MDS, USHA, and CKD, 11/20/23 p/w hypoxia    (1)CKD - nonproteinuric CKD4 - largely due to renal artery stenosis; nephrolithiasis, hypertensive nephrosclerosis, and renal atheroembolic disease also may be playing roles here     (2)JUAN RAMON - improving renal fxn     (3)Lytes - accepatble    (4)Pancytopenia - MDS    (5)Hypoxia - cardiogenic pulmonary edema, it appears. Improving , with diuresis. on room air at present.  Potentially for eventual cardiac cath this admission? Would hold off until creatinine trends down below 2.5    (6)CV- BP acceptable at present    (7)Anemia- hgb improving slightly    (8)Hyperphosphatemia- trend for now    RECOMMEND:  (1)Continue  Lasix as prescribed for now   (2)BMP+Mg+PO4 daily while admitted  (3)Dose new meds for GFR 15-20ml/min  (4)No cardiac cath until creat trends down below 2.5 (unless urgently needed)    Moises Grant NP-BC  [employer]  [phone]

## 2023-11-27 ENCOUNTER — APPOINTMENT (OUTPATIENT)
Dept: INFUSION THERAPY | Facility: HOSPITAL | Age: 83
End: 2023-11-27

## 2023-11-27 ENCOUNTER — TRANSCRIPTION ENCOUNTER (OUTPATIENT)
Age: 83
End: 2023-11-27

## 2023-11-27 LAB
ALBUMIN SERPL ELPH-MCNC: 3.7 G/DL — SIGNIFICANT CHANGE UP (ref 3.3–5)
ALBUMIN SERPL ELPH-MCNC: 3.7 G/DL — SIGNIFICANT CHANGE UP (ref 3.3–5)
ALP SERPL-CCNC: 210 U/L — HIGH (ref 40–120)
ALP SERPL-CCNC: 210 U/L — HIGH (ref 40–120)
ALT FLD-CCNC: 54 U/L — HIGH (ref 10–45)
ALT FLD-CCNC: 54 U/L — HIGH (ref 10–45)
ANION GAP SERPL CALC-SCNC: 12 MMOL/L — SIGNIFICANT CHANGE UP (ref 5–17)
ANION GAP SERPL CALC-SCNC: 12 MMOL/L — SIGNIFICANT CHANGE UP (ref 5–17)
ANISOCYTOSIS BLD QL: SIGNIFICANT CHANGE UP
ANISOCYTOSIS BLD QL: SIGNIFICANT CHANGE UP
AST SERPL-CCNC: 49 U/L — HIGH (ref 10–40)
AST SERPL-CCNC: 49 U/L — HIGH (ref 10–40)
BASOPHILS # BLD AUTO: 0.07 K/UL — SIGNIFICANT CHANGE UP (ref 0–0.2)
BASOPHILS # BLD AUTO: 0.07 K/UL — SIGNIFICANT CHANGE UP (ref 0–0.2)
BASOPHILS NFR BLD AUTO: 4.5 % — HIGH (ref 0–2)
BASOPHILS NFR BLD AUTO: 4.5 % — HIGH (ref 0–2)
BILIRUB SERPL-MCNC: 0.4 MG/DL — SIGNIFICANT CHANGE UP (ref 0.2–1.2)
BILIRUB SERPL-MCNC: 0.4 MG/DL — SIGNIFICANT CHANGE UP (ref 0.2–1.2)
BLD GP AB SCN SERPL QL: NEGATIVE — SIGNIFICANT CHANGE UP
BLD GP AB SCN SERPL QL: NEGATIVE — SIGNIFICANT CHANGE UP
BUN SERPL-MCNC: 40 MG/DL — HIGH (ref 7–23)
BUN SERPL-MCNC: 40 MG/DL — HIGH (ref 7–23)
CALCIUM SERPL-MCNC: 8.3 MG/DL — LOW (ref 8.4–10.5)
CALCIUM SERPL-MCNC: 8.3 MG/DL — LOW (ref 8.4–10.5)
CHLORIDE SERPL-SCNC: 107 MMOL/L — SIGNIFICANT CHANGE UP (ref 96–108)
CHLORIDE SERPL-SCNC: 107 MMOL/L — SIGNIFICANT CHANGE UP (ref 96–108)
CO2 SERPL-SCNC: 22 MMOL/L — SIGNIFICANT CHANGE UP (ref 22–31)
CO2 SERPL-SCNC: 22 MMOL/L — SIGNIFICANT CHANGE UP (ref 22–31)
CREAT SERPL-MCNC: 2.51 MG/DL — HIGH (ref 0.5–1.3)
CREAT SERPL-MCNC: 2.51 MG/DL — HIGH (ref 0.5–1.3)
DACRYOCYTES BLD QL SMEAR: SIGNIFICANT CHANGE UP
DACRYOCYTES BLD QL SMEAR: SIGNIFICANT CHANGE UP
EGFR: 25 ML/MIN/1.73M2 — LOW
EGFR: 25 ML/MIN/1.73M2 — LOW
ELLIPTOCYTES BLD QL SMEAR: SLIGHT — SIGNIFICANT CHANGE UP
ELLIPTOCYTES BLD QL SMEAR: SLIGHT — SIGNIFICANT CHANGE UP
EOSINOPHIL # BLD AUTO: 0.07 K/UL — SIGNIFICANT CHANGE UP (ref 0–0.5)
EOSINOPHIL # BLD AUTO: 0.07 K/UL — SIGNIFICANT CHANGE UP (ref 0–0.5)
EOSINOPHIL NFR BLD AUTO: 4.5 % — SIGNIFICANT CHANGE UP (ref 0–6)
EOSINOPHIL NFR BLD AUTO: 4.5 % — SIGNIFICANT CHANGE UP (ref 0–6)
GIANT PLATELETS BLD QL SMEAR: PRESENT — SIGNIFICANT CHANGE UP
GIANT PLATELETS BLD QL SMEAR: PRESENT — SIGNIFICANT CHANGE UP
GLUCOSE SERPL-MCNC: 94 MG/DL — SIGNIFICANT CHANGE UP (ref 70–99)
GLUCOSE SERPL-MCNC: 94 MG/DL — SIGNIFICANT CHANGE UP (ref 70–99)
HCT VFR BLD CALC: 27.3 % — LOW (ref 39–50)
HCT VFR BLD CALC: 27.3 % — LOW (ref 39–50)
HGB BLD-MCNC: 9 G/DL — LOW (ref 13–17)
HGB BLD-MCNC: 9 G/DL — LOW (ref 13–17)
LYMPHOCYTES # BLD AUTO: 1.02 K/UL — SIGNIFICANT CHANGE UP (ref 1–3.3)
LYMPHOCYTES # BLD AUTO: 1.02 K/UL — SIGNIFICANT CHANGE UP (ref 1–3.3)
LYMPHOCYTES # BLD AUTO: 68.7 % — HIGH (ref 13–44)
LYMPHOCYTES # BLD AUTO: 68.7 % — HIGH (ref 13–44)
MACROCYTES BLD QL: SIGNIFICANT CHANGE UP
MACROCYTES BLD QL: SIGNIFICANT CHANGE UP
MAGNESIUM SERPL-MCNC: 2.4 MG/DL — SIGNIFICANT CHANGE UP (ref 1.6–2.6)
MAGNESIUM SERPL-MCNC: 2.4 MG/DL — SIGNIFICANT CHANGE UP (ref 1.6–2.6)
MANUAL SMEAR VERIFICATION: SIGNIFICANT CHANGE UP
MANUAL SMEAR VERIFICATION: SIGNIFICANT CHANGE UP
MCHC RBC-ENTMCNC: 33 GM/DL — SIGNIFICANT CHANGE UP (ref 32–36)
MCHC RBC-ENTMCNC: 33 GM/DL — SIGNIFICANT CHANGE UP (ref 32–36)
MCHC RBC-ENTMCNC: 36.7 PG — HIGH (ref 27–34)
MCHC RBC-ENTMCNC: 36.7 PG — HIGH (ref 27–34)
MCV RBC AUTO: 111.4 FL — HIGH (ref 80–100)
MCV RBC AUTO: 111.4 FL — HIGH (ref 80–100)
MONOCYTES # BLD AUTO: 0.08 K/UL — SIGNIFICANT CHANGE UP (ref 0–0.9)
MONOCYTES # BLD AUTO: 0.08 K/UL — SIGNIFICANT CHANGE UP (ref 0–0.9)
MONOCYTES NFR BLD AUTO: 5.3 % — SIGNIFICANT CHANGE UP (ref 2–14)
MONOCYTES NFR BLD AUTO: 5.3 % — SIGNIFICANT CHANGE UP (ref 2–14)
NEUTROPHILS # BLD AUTO: 0.25 K/UL — LOW (ref 1.8–7.4)
NEUTROPHILS # BLD AUTO: 0.25 K/UL — LOW (ref 1.8–7.4)
NEUTROPHILS NFR BLD AUTO: 17 % — LOW (ref 43–77)
NEUTROPHILS NFR BLD AUTO: 17 % — LOW (ref 43–77)
NRBC # BLD: 0 /100 WBCS — SIGNIFICANT CHANGE UP (ref 0–0)
NRBC # BLD: 0 /100 WBCS — SIGNIFICANT CHANGE UP (ref 0–0)
NRBC # BLD: 1 /100 — HIGH (ref 0–0)
NRBC # BLD: 1 /100 — HIGH (ref 0–0)
PHOSPHATE SERPL-MCNC: 4.5 MG/DL — SIGNIFICANT CHANGE UP (ref 2.5–4.5)
PHOSPHATE SERPL-MCNC: 4.5 MG/DL — SIGNIFICANT CHANGE UP (ref 2.5–4.5)
PLAT MORPH BLD: NORMAL — SIGNIFICANT CHANGE UP
PLAT MORPH BLD: NORMAL — SIGNIFICANT CHANGE UP
PLATELET # BLD AUTO: 210 K/UL — SIGNIFICANT CHANGE UP (ref 150–400)
PLATELET # BLD AUTO: 210 K/UL — SIGNIFICANT CHANGE UP (ref 150–400)
POIKILOCYTOSIS BLD QL AUTO: SLIGHT — SIGNIFICANT CHANGE UP
POIKILOCYTOSIS BLD QL AUTO: SLIGHT — SIGNIFICANT CHANGE UP
POLYCHROMASIA BLD QL SMEAR: SLIGHT — SIGNIFICANT CHANGE UP
POLYCHROMASIA BLD QL SMEAR: SLIGHT — SIGNIFICANT CHANGE UP
POTASSIUM SERPL-MCNC: 4.4 MMOL/L — SIGNIFICANT CHANGE UP (ref 3.5–5.3)
POTASSIUM SERPL-MCNC: 4.4 MMOL/L — SIGNIFICANT CHANGE UP (ref 3.5–5.3)
POTASSIUM SERPL-SCNC: 4.4 MMOL/L — SIGNIFICANT CHANGE UP (ref 3.5–5.3)
POTASSIUM SERPL-SCNC: 4.4 MMOL/L — SIGNIFICANT CHANGE UP (ref 3.5–5.3)
PROT SERPL-MCNC: 6.8 G/DL — SIGNIFICANT CHANGE UP (ref 6–8.3)
PROT SERPL-MCNC: 6.8 G/DL — SIGNIFICANT CHANGE UP (ref 6–8.3)
RBC # BLD: 2.45 M/UL — LOW (ref 4.2–5.8)
RBC # BLD: 2.45 M/UL — LOW (ref 4.2–5.8)
RBC # FLD: 23.9 % — HIGH (ref 10.3–14.5)
RBC # FLD: 23.9 % — HIGH (ref 10.3–14.5)
RBC BLD AUTO: ABNORMAL
RBC BLD AUTO: ABNORMAL
RH IG SCN BLD-IMP: NEGATIVE — SIGNIFICANT CHANGE UP
RH IG SCN BLD-IMP: NEGATIVE — SIGNIFICANT CHANGE UP
SODIUM SERPL-SCNC: 141 MMOL/L — SIGNIFICANT CHANGE UP (ref 135–145)
SODIUM SERPL-SCNC: 141 MMOL/L — SIGNIFICANT CHANGE UP (ref 135–145)
WBC # BLD: 1.48 K/UL — LOW (ref 3.8–10.5)
WBC # BLD: 1.48 K/UL — LOW (ref 3.8–10.5)
WBC # FLD AUTO: 1.48 K/UL — LOW (ref 3.8–10.5)
WBC # FLD AUTO: 1.48 K/UL — LOW (ref 3.8–10.5)

## 2023-11-27 PROCEDURE — 71045 X-RAY EXAM CHEST 1 VIEW: CPT | Mod: 26

## 2023-11-27 PROCEDURE — 93010 ELECTROCARDIOGRAM REPORT: CPT

## 2023-11-27 PROCEDURE — 99233 SBSQ HOSP IP/OBS HIGH 50: CPT | Mod: GC

## 2023-11-27 PROCEDURE — 99232 SBSQ HOSP IP/OBS MODERATE 35: CPT | Mod: GC

## 2023-11-27 RX ORDER — GABAPENTIN 400 MG/1
300 CAPSULE ORAL
Refills: 0 | Status: DISCONTINUED | OUTPATIENT
Start: 2023-11-27 | End: 2023-11-28

## 2023-11-27 RX ORDER — HEPARIN SODIUM 5000 [USP'U]/ML
5000 INJECTION INTRAVENOUS; SUBCUTANEOUS EVERY 8 HOURS
Refills: 0 | Status: DISCONTINUED | OUTPATIENT
Start: 2023-11-27 | End: 2023-11-28

## 2023-11-27 RX ADMIN — Medication 200 MILLIGRAM(S): at 17:43

## 2023-11-27 RX ADMIN — GABAPENTIN 300 MILLIGRAM(S): 400 CAPSULE ORAL at 17:43

## 2023-11-27 RX ADMIN — Medication 40 MILLIGRAM(S): at 09:00

## 2023-11-27 RX ADMIN — Medication 81 MILLIGRAM(S): at 12:33

## 2023-11-27 RX ADMIN — SERTRALINE 25 MILLIGRAM(S): 25 TABLET, FILM COATED ORAL at 12:36

## 2023-11-27 RX ADMIN — PANTOPRAZOLE SODIUM 40 MILLIGRAM(S): 20 TABLET, DELAYED RELEASE ORAL at 09:02

## 2023-11-27 RX ADMIN — CLOPIDOGREL BISULFATE 75 MILLIGRAM(S): 75 TABLET, FILM COATED ORAL at 12:33

## 2023-11-27 RX ADMIN — CHLORHEXIDINE GLUCONATE 1 APPLICATION(S): 213 SOLUTION TOPICAL at 12:36

## 2023-11-27 RX ADMIN — Medication 150 MILLIGRAM(S): at 09:00

## 2023-11-27 RX ADMIN — Medication 200 MILLIGRAM(S): at 09:01

## 2023-11-27 RX ADMIN — HEPARIN SODIUM 5000 UNIT(S): 5000 INJECTION INTRAVENOUS; SUBCUTANEOUS at 14:36

## 2023-11-27 RX ADMIN — ATORVASTATIN CALCIUM 80 MILLIGRAM(S): 80 TABLET, FILM COATED ORAL at 22:15

## 2023-11-27 RX ADMIN — HEPARIN SODIUM 5000 UNIT(S): 5000 INJECTION INTRAVENOUS; SUBCUTANEOUS at 22:15

## 2023-11-27 NOTE — DISCHARGE NOTE PROVIDER - NSDCCPCAREPLAN_GEN_ALL_CORE_FT
PRINCIPAL DISCHARGE DIAGNOSIS  Diagnosis: Acute decompensated heart failure  Assessment and Plan of Treatment: You came to the hospital because you were found to have a low oxygen level at your hematologist's office. Your low oxygen level was due to heart failure, which occurs when you have a weak heart. The most common cause of a weak heart is due to blockages in the arteries that supply blood to the heart. In order to evaluate the arteries of your heart for blockages, a left heart catheterization or coronary angiogram is typically performed. We were unable to perform a left heart catheterization because of your worsening kidney function. This procedure requires intravenous contrast which could further damage your kidneys. Please follow up with your cardiologist within 1 week.   You should STOP taking cilastozol at home. This medication can contribute to flare-ups of your heart failure.   You were started on a medication called lasix to help prevent the build-up of fluid in your body. Please continue to take your aspirin and Plavix daily. It is important that you not miss any doses of these medications  If you develop shortness of breath, chest pain, palpitaitons, weight gain, or  leg swelling, please seek medical attention immediately. You should minimize your salt intake (<2000 mg/day) and follow a low cholesterol diet.      SECONDARY DISCHARGE DIAGNOSES  Diagnosis: Acute kidney injury superimposed on CKD  Assessment and Plan of Treatment: You kidney function worsened while you were in the hospital. Several of your medications required dosage adjustments due to your worsening kidney function. The doses of your allopurinol, acylovir, gabapentin were reduced. Please follow up with a nephrologist within 1 week.    Diagnosis: Myelodysplastic syndrome  Assessment and Plan of Treatment: Your blood counts in the hospital were low. The hematology team evaluated you and determined that there was no need for you to receive chemo while in the hospital.  You required 1 unit of blood for a low hemoglobin level. Please follow up with your hematologist within 1 week.    Diagnosis: Atrial tachycardia  Assessment and Plan of Treatment: You had several episodes of an irregular heart rhythm while you were in the hospital. Your metoprolol succinate was increased to 150 mg daily     PRINCIPAL DISCHARGE DIAGNOSIS  Diagnosis: Acute decompensated heart failure  Assessment and Plan of Treatment: You came to the hospital because you were found to have a low oxygen level at your hematologist's office. Your low oxygen level was due to heart failure, which occurs when you have a weak heart. The most common cause of a weak heart is due to blockages in the arteries that supply blood to the heart. In order to evaluate the arteries of your heart for blockages, a left heart catheterization or coronary angiogram is typically performed. We were unable to perform a left heart catheterization because of your worsening kidney function. This procedure requires intravenous contrast which could further damage your kidneys. Please follow up with your cardiologist within 1 week.   You should STOP taking cilastozol at home. This medication can contribute to flare-ups of your heart failure.   You were started on a medication called lasix to help prevent the build-up of fluid in your body. Please continue to take your aspirin and Plavix daily. It is important that you not miss any doses of these medications  If you develop shortness of breath, chest pain, palpitaitons, weight gain, or  leg swelling, please seek medical attention immediately. You should minimize your salt intake (<2000 mg/day) and follow a low cholesterol diet.      SECONDARY DISCHARGE DIAGNOSES  Diagnosis: Acute kidney injury superimposed on CKD  Assessment and Plan of Treatment: You kidney function worsened while you were in the hospital. Several of your medications required dosage adjustments due to your worsening kidney function.  Please follow up with your nephrologist within 1 week.    Diagnosis: Myelodysplastic syndrome  Assessment and Plan of Treatment: Your blood counts in the hospital were low. The hematology team evaluated you and determined that there was no need for you to receive chemo while in the hospital.  You required 1 unit of blood for a low hemoglobin level. Please follow up with your hematologist within 1 week.    Diagnosis: Atrial tachycardia  Assessment and Plan of Treatment: You had several episodes of an irregular heart rhythm while you were in the hospital. Your metoprolol succinate was increased to 150 mg daily

## 2023-11-27 NOTE — DISCHARGE NOTE PROVIDER - CARE PROVIDERS DIRECT ADDRESSES
,[email or web address]

## 2023-11-27 NOTE — PROGRESS NOTE ADULT - PROBLEM SELECTOR PLAN 1
Patient presented with incidental, asymptomatic hypoxemia at hematology clinic, down to 88% on room air  - In the ED, patient noted to be persistently hypoxemic to 88% on 5L NC with tachypnea, requiring BIPAP  - CT chest without PE, small bilateral pleural effusions, mild pulmonary edema  - ProBNP elevated to 6700, higher in previous admission  - Had episode of epigastric "lower chest" pain in ED with associated diaphoresis and respiratory distress, may be atypical presentation of cardiac ischemic pain, but since resolved  - TTE 1/2023: moderate AS, normal RV/LV function    Plan:  > CXR 11/24 with bibasilar atelectasis vs layering effusion. Pt appears euvolemic and is now on room air. Resumed lasix 40 mg IV qD 11/25 in setting of improving JUAN RAMON, will transition to lasix 40 mg PO 11/26  > TTE 11/21 EF 40%, moderate LV diastolic dysfunction, basal and mid anterolateral wall, basal and mid inferior wall, basal and mid inferolateral wall, and basal anteroseptal segment are abnormal, moderate AS  > cards consulted for ischemic eval for newly-reduced EF with new wall motional abnl and atypical angina. Plan for possible LHC, pending improvement in JUAN RAMON  > continue metoprolol 150 mg ER qD. Continue DAPT  > incentive spirometer given atelectasis on CT  - Now off of O2 Patient presented with incidental, asymptomatic hypoxemia at hematology clinic, down to 88% on room air  - In the ED, patient noted to be persistently hypoxemic to 88% on 5L NC with tachypnea, requiring BIPAP  - CT chest without PE, small bilateral pleural effusions, mild pulmonary edema  - ProBNP elevated to 6700, higher in previous admission  - Had episode of epigastric "lower chest" pain in ED with associated diaphoresis and respiratory distress, may be atypical presentation of cardiac ischemic pain, but since resolved  - TTE 1/2023: moderate AS, normal RV/LV function    Plan:  > CXR 11/24 with bibasilar atelectasis vs layering effusion. Pt appears euvolemic and is now on room air  > TTE 11/21 EF 40%, moderate LV diastolic dysfunction, basal and mid anterolateral wall, basal and mid inferior wall, basal and mid inferolateral wall, and basal anteroseptal segment are abnormal, moderate AS  > cards consulted for ischemic eval for newly-reduced EF with new wall motional abnl and atypical angina. Given JUAN RAMON, plan for outpatient LHC  > continue metoprolol 150 mg ER qD. Continue DAPT  > incentive spirometer given atelectasis on CT

## 2023-11-27 NOTE — PROGRESS NOTE ADULT - PROBLEM SELECTOR PLAN 6
Patient with known atrial tachycardia, diagnosed during admission 1/2023   - patient followed by Dr. Reddy for cardiology, and Dr. Feldman for EP  - recently had event monitor outpatient revealing significant runs of supraventricular tachycardia, though asymptomatic during these events  - restarted on BB recently by EP    Plan:  - improved  - continue metoprolol succinate 150 mg qD

## 2023-11-27 NOTE — PROGRESS NOTE ADULT - SUBJECTIVE AND OBJECTIVE BOX
Overnight events noted      VITAL:  T(C): , Max: 37.1 (11-27-23 @ 07:55)  T(F): , Max: 98.7 (11-27-23 @ 07:55)  HR: 63 (11-27-23 @ 08:55)  BP: 129/73 (11-27-23 @ 08:55)  BP(mean): --  RR: 18 (11-27-23 @ 08:55)  SpO2: 97% (11-27-23 @ 08:55)  Wt(kg): --      PHYSICAL EXAM:  Constitutional: NAD at rest on NCO2  HEENT: NCAT, DMM  Neck: Supple, No JVD  Respiratory: (+)bibasilar rales  Cardiovascular: reg s1s2, (+)1/6 LESA  Gastrointestinal: BS+, soft, NT/ND  Extremities: No peripheral edema b/l  Neurological: reduced generalized strength  Back: no CVAT b/l  Skin: (+)erythema b/l shins    LABS:                        9.0    1.48  )-----------( 210      ( 27 Nov 2023 05:10 )             27.3     Na(141)/K(4.4)/Cl(107)/HCO3(22)/BUN(40)/Cr(2.51)Glu(94)/Ca(8.3)/Mg(2.4)/PO4(4.5)    11-27 @ 05:09  Na(139)/K(3.9)/Cl(105)/HCO3(20)/BUN(43)/Cr(2.67)Glu(171)/Ca(8.1)/Mg(2.5)/PO4(5.1)    11-26 @ 05:48  Na(140)/K(4.5)/Cl(105)/HCO3(22)/BUN(42)/Cr(3.16)Glu(96)/Ca(8.5)/Mg(2.7)/PO4(4.9)    11-25 @ 06:47      IMPRESSION: 83M w/ HTN, gout, MDS, USHA, and CKD, 11/20/23 p/w hypoxia    (1)CKD - nonproteinuric CKD4 - largely due to renal artery stenosis; nephrolithiasis, hypertensive nephrosclerosis, and renal atheroembolic disease also may be playing roles here     (2)JUAN RAMON -ATN from contrast nephropathy - resolving    (3)Lytes - acceptable for now    (4)Pancytopenia - MDS    (5)Hypoxia - cardiogenic pulmonary edema, it appears. Improved, with diuresis. Potentially for eventual cardiac cath this admission? We should be able to move forward later this week, now that the creatinine is downtrending      RECOMMEND:  (1)Lasix as ordered  (2)No objection to cath once creatinine <2.5  (3)Dose new meds for GFR 20-30ml/min                Edis Cabral MD  [facility]  Office/on call physician: [phone]  Cell (7a-7p): [phone]       Overnight events noted      VITAL:  T(C): , Max: 37.1 (11-27-23 @ 07:55)  T(F): , Max: 98.7 (11-27-23 @ 07:55)  HR: 63 (11-27-23 @ 08:55)  BP: 129/73 (11-27-23 @ 08:55)  BP(mean): --  RR: 18 (11-27-23 @ 08:55)  SpO2: 97% (11-27-23 @ 08:55)  Wt(kg): --      PHYSICAL EXAM:  Constitutional: NAD at rest on NCO2  HEENT: NCAT, DMM  Neck: Supple, No JVD  Respiratory: (+)bibasilar rales  Cardiovascular: reg s1s2, (+)1/6 LESA  Gastrointestinal: BS+, soft, NT/ND  Extremities: No peripheral edema b/l  Neurological: reduced generalized strength  Back: no CVAT b/l  Skin: (+)erythema b/l shins    LABS:                        9.0    1.48  )-----------( 210      ( 27 Nov 2023 05:10 )             27.3     Na(141)/K(4.4)/Cl(107)/HCO3(22)/BUN(40)/Cr(2.51)Glu(94)/Ca(8.3)/Mg(2.4)/PO4(4.5)    11-27 @ 05:09  Na(139)/K(3.9)/Cl(105)/HCO3(20)/BUN(43)/Cr(2.67)Glu(171)/Ca(8.1)/Mg(2.5)/PO4(5.1)    11-26 @ 05:48  Na(140)/K(4.5)/Cl(105)/HCO3(22)/BUN(42)/Cr(3.16)Glu(96)/Ca(8.5)/Mg(2.7)/PO4(4.9)    11-25 @ 06:47      IMPRESSION: 83M w/ HTN, gout, MDS, USHA, and CKD, 11/20/23 p/w hypoxia    (1)CKD - nonproteinuric CKD4 - largely due to renal artery stenosis; nephrolithiasis, hypertensive nephrosclerosis, and renal atheroembolic disease also may be playing roles here     (2)JUAN RAMON -ATN from contrast nephropathy - resolving    (3)Lytes - acceptable for now    (4)Pancytopenia - MDS    (5)Hypoxia - cardiogenic pulmonary edema, it appears. Improved, with diuresis. Potentially for eventual cardiac cath this admission? We should be able to move forward later this week, now that the creatinine is downtrending      RECOMMEND:  (1)Lasix as ordered  (2)No objection to cath once creatinine <2.5  (3)Meds for GFR 20-30ml/min - can increase Gabapentin to 300bid                Edis Cabral MD  [facility]  Office/on call physician: [phone]  Cell (7a-7p): [phone]       No pain, no sob      VITAL:  T(C): , Max: 37.1 (11-27-23 @ 07:55)  T(F): , Max: 98.7 (11-27-23 @ 07:55)  HR: 63 (11-27-23 @ 08:55)  BP: 129/73 (11-27-23 @ 08:55)  BP(mean): --  RR: 18 (11-27-23 @ 08:55)  SpO2: 97% (11-27-23 @ 08:55)  Wt(kg): --      PHYSICAL EXAM:  Constitutional: NAD at rest on NCO2  HEENT: NCAT, DMM  Neck: Supple, No JVD  Respiratory: (+)bibasilar rales  Cardiovascular: reg s1s2, (+)1/6 LESA  Gastrointestinal: BS+, soft, NT/ND  Extremities: No peripheral edema b/l  Neurological: reduced generalized strength  Back: no CVAT b/l  Skin: (+)erythema b/l shins    LABS:                        9.0    1.48  )-----------( 210      ( 27 Nov 2023 05:10 )             27.3     Na(141)/K(4.4)/Cl(107)/HCO3(22)/BUN(40)/Cr(2.51)Glu(94)/Ca(8.3)/Mg(2.4)/PO4(4.5)    11-27 @ 05:09  Na(139)/K(3.9)/Cl(105)/HCO3(20)/BUN(43)/Cr(2.67)Glu(171)/Ca(8.1)/Mg(2.5)/PO4(5.1)    11-26 @ 05:48  Na(140)/K(4.5)/Cl(105)/HCO3(22)/BUN(42)/Cr(3.16)Glu(96)/Ca(8.5)/Mg(2.7)/PO4(4.9)    11-25 @ 06:47      IMPRESSION: 83M w/ HTN, gout, MDS, USHA, and CKD, 11/20/23 p/w hypoxia    (1)CKD - nonproteinuric CKD4 - largely due to renal artery stenosis; nephrolithiasis, hypertensive nephrosclerosis, and renal atheroembolic disease also may be playing roles here     (2)JUAN RAMON -ATN from contrast nephropathy - resolving    (3)Lytes - acceptable for now    (4)Pancytopenia - MDS    (5)Hypoxia - cardiogenic pulmonary edema, it appears. Improved, with diuresis. Potentially for eventual cardiac cath this admission? We should be able to move forward later this week, now that the creatinine is downtrending      RECOMMEND:  (1)Lasix as ordered  (2)No objection to cath once creatinine <2.5  (3)Meds for GFR 20-30ml/min - can increase Gabapentin to 300bid  (4)If for discharge, can f/u at my office in 1-4 weeks              Edis Cabral MD  [facility]  Office/on call physician: [phone]  Cell (7a-7p): [phone]

## 2023-11-27 NOTE — DISCHARGE NOTE PROVIDER - CARE PROVIDER_API CALL
Gabriel Reddy  Vascular Medicine  [address], [address] [place] [address]  Phone: [phone]  Fax: [phone]  Established Patient  Follow Up Time: 1 week    Maksim Reddy  Internal Medicine  [address], [address]  [place] [address]  Phone: [phone]  Fax: [phone]  Established Patient  Follow Up Time: 1 week    Zita Mcmahon  Hematology  [address] [place] [address]  Phone: [phone]  Fax: [phone]  Established Patient  Follow Up Time: 1 week   Gabriel Reddy  Vascular Medicine  [address], [address] [place] [address]  Phone: [phone]  Fax: [phone]  Established Patient  Follow Up Time: 1 week    Maksim Reddy  Internal Medicine  [address], [address]  [place] [address]  Phone: [phone]  Fax: [phone]  Established Patient  Follow Up Time: 1 week    Zita Mcmahon  Hematology  [address] [place] [address]  Phone: [phone]  Fax: [phone]  Established Patient  Follow Up Time: 1 week    Edis Cabral  Nephrology  [address] [facility] [address]  [place] [address]  Phone: [phone]  Fax: [phone]  Established Patient  Follow Up Time: 1 week

## 2023-11-27 NOTE — PROGRESS NOTE ADULT - ASSESSMENT
84 y/o M with PMH of gout, myelodysplastic syndrome, coronary artery disease, carotid artery stenosis, hypertension, hyperlipidemia, chronic kidney disease stage III, renal artery stenosis (s/p left renal artery stent), spinal stenosis, and atrial tachycardia initially presenting with AHRF, concern for volume overload with episodic lower chest pain. Cardiology consulted for new HFrEF with WMA.    #HFrEF likely 2/2 known CAD  Previous TTE 1/2023: moderate AS, normal RV/LV function  TTE 11/2023: LVEF 40% - basal and mid anterolateral, basal and mid inferior, basal and mid inferolateral, and basal anteroseptal WMAs noted. Grade II diastolic dysfunction. Moderate AS, KEENA 1.06 cm.  pBNP 6702, trop 154 -> 145  LHC 3/18/2021: pRCA 100% , mLAD 60%, OM1 60% - medical management, PCI of  deferred at that time      - Asprin 81mg daily  - Plavix 75mg daily  - Toprol 100mg QD  - would resume diuresis- would dose at least 40mg based on renal function  - Replete K > 4, Mg > 2  - Daily standing weights, strict I/Os  - Holding off further ischemic work up pending improvement in renal function        Please check [email or web address] password: [email or web address]" for cardiology service schedule and contact information.    82 y/o M with PMH of gout, myelodysplastic syndrome, coronary artery disease, carotid artery stenosis, hypertension, hyperlipidemia, chronic kidney disease stage III, renal artery stenosis (s/p left renal artery stent), spinal stenosis, and atrial tachycardia initially presenting with AHRF, concern for volume overload with episodic lower chest pain. Cardiology consulted for new HFrEF with WMA.    #HFrEF likely 2/2 known CAD  Previous TTE 1/2023: moderate AS, normal RV/LV function  TTE 11/2023: LVEF 40% - basal and mid anterolateral, basal and mid inferior, basal and mid inferolateral, and basal anteroseptal WMAs noted. Grade II diastolic dysfunction. Moderate AS, KEENA 1.06 cm.  pBNP 6702, trop 154 -> 145  LHC 3/18/2021: pRCA 100% , mLAD 60%, OM1 60% - medical management, PCI of  deferred at that time      - Asprin 81mg daily  - Plavix 75mg daily  - Toprol 100mg QD  - Now on RA, oxygenation status significantly improved from admission, CXR 11/27 with significant improvement in pleural effusions.  - can d/c on Lasix 40 mg daily PO, follow up with Cardiology outpatient  - Replete K > 4, Mg > 2  - Daily standing weights, strict I/Os  - Holding off further ischemic work up pending improvement in renal function, can follow up outpatient as pt likely high risk for PCI/CABG.      Will sign off, please call us back with questions.    Shayan Ortiz MD MPH  PGY-3, Internal Medicine    ** Recommendations are not final until note is cosigned by attending. **     84 y/o M with PMH of gout, myelodysplastic syndrome, coronary artery disease, carotid artery stenosis, hypertension, hyperlipidemia, chronic kidney disease stage III, renal artery stenosis (s/p left renal artery stent), spinal stenosis, and atrial tachycardia initially presenting with AHRF, concern for volume overload with episodic lower chest pain. Cardiology consulted for new HFrEF with WMA.    #HFrEF likely 2/2 known CAD  Previous TTE 1/2023: moderate AS, normal RV/LV function  TTE 11/2023: LVEF 40% - basal and mid anterolateral, basal and mid inferior, basal and mid inferolateral, and basal anteroseptal WMAs noted. Grade II diastolic dysfunction. Moderate AS, KEENA 1.06 cm.  pBNP 6702, trop 154 -> 145  LHC 3/18/2021: pRCA 100% , mLAD 60%, OM1 60% - medical management, PCI of  deferred at that time      - Asprin 81mg daily  - Plavix 75mg daily  - Toprol 100mg QD  - Now on RA, oxygenation status significantly improved from admission, CXR 11/27 with significant improvement in pleural effusions.  - can d/c on Lasix 40 mg daily PO, follow up with Cardiology outpatient, can follow up with Dr. Kaitlyn Jimenez  - Replete K > 4, Mg > 2  - Daily standing weights, strict I/Os  - Holding off further ischemic work up pending improvement in renal function, can follow up outpatient as pt likely high risk for PCI/CABG.      Will sign off, please call us back with questions.    Shayan Ortiz MD MPH  PGY-3, Internal Medicine    ** Recommendations are not final until note is cosigned by attending. **     82 y/o M with PMH of gout, myelodysplastic syndrome, coronary artery disease, carotid artery stenosis, hypertension, hyperlipidemia, chronic kidney disease stage III, renal artery stenosis (s/p left renal artery stent), spinal stenosis, and atrial tachycardia.  Initially presented with AHRF, concern for volume overload with episodic lower chest pain.   Cardiology consulted for new HFrEF with WMA.      #HFrEF likely 2/2 known CAD  Previous TTE 1/2023: moderate AS, normal RV/LV function  TTE 11/2023: LVEF 40% - basal and mid anterolateral, basal and mid inferior, basal and mid inferolateral, and basal anteroseptal WMAs noted. Grade II diastolic dysfunction. Moderate AS, KEENA 1.06 cm.  pBNP 6702, trop 154 -> 145  LHC 3/18/2021: pRCA 100% , mLAD 60%, OM1 60% - medical management, PCI of  deferred at that time    REC:  - Asprin 81mg daily  - Plavix 75mg daily  - Toprol XL 100mg QD  - Now on RA, oxygenation status significantly improved from admission, CXR 11/27 with significant improvement in pleural effusions.  - can d/c on Lasix 40 mg daily PO, follow up with Cardiology outpatient, can follow up with Dr. Kaitlyn Jimenez  - Replete K > 4, Mg > 2  - Daily standing weights, strict I/Os  - Holding off further ischemic work up pending improvement in renal function, can follow up outpatient as pt likely high risk for PCI/CABG.      Will sign off, please call us back with questions as needed.    Shayan Ortiz MD MPH  PGY-3, Internal Medicine    Plan discussed with cardiology fellow and resident.  Patient seen and examined.  Hx., exam and labs as above.  I agree with the assessment and recommendations, which I have reviewed and edited where appropriate.  Demario Ortega M.D.  Cardiology Attending, Consult Service    For Cardiology consults and questions, all Cardiology service information can be found 24/7 on [email or web address] - use password: [email or web address] to log in.

## 2023-11-27 NOTE — PROGRESS NOTE ADULT - SUBJECTIVE AND OBJECTIVE BOX
PROGRESS NOTE:   Authored by Dr. Michelle Bravo MD (PGY-3). Pager [facility] [phone]/ [facility] [id number]    Patient is a 83y old  Male who presents with a chief complaint of acute hypoxic respiratory failure (26 Nov 2023 11:28)      SUBJECTIVE / OVERNIGHT EVENTS:  No acute events overnight.     MEDICATIONS  (STANDING):  acyclovir   Oral Tab/Cap 200 milliGRAM(s) Oral every 12 hours  aspirin enteric coated 81 milliGRAM(s) Oral daily  atorvastatin 80 milliGRAM(s) Oral at bedtime  chlorhexidine 2% Cloths 1 Application(s) Topical daily  clopidogrel Tablet 75 milliGRAM(s) Oral daily  furosemide    Tablet 40 milliGRAM(s) Oral daily  gabapentin 300 milliGRAM(s) Oral daily  metoprolol succinate  milliGRAM(s) Oral daily  pantoprazole    Tablet 40 milliGRAM(s) Oral before breakfast  sertraline 25 milliGRAM(s) Oral daily    MEDICATIONS  (PRN):  aluminum hydroxide/magnesium hydroxide/simethicone Suspension 30 milliLiter(s) Oral every 6 hours PRN Dyspepsia  cyclobenzaprine 5 milliGRAM(s) Oral three times a day PRN Muscle Spasm      CAPILLARY BLOOD GLUCOSE        I&O's Summary    26 Nov 2023 07:01  -  27 Nov 2023 07:00  --------------------------------------------------------  IN: 650 mL / OUT: 1475 mL / NET: -825 mL        PHYSICAL EXAM:  Vital Signs Last 24 Hrs  T(C): 36.4 (27 Nov 2023 04:39), Max: 36.6 (26 Nov 2023 12:07)  T(F): 97.6 (27 Nov 2023 04:39), Max: 97.9 (26 Nov 2023 12:07)  HR: 67 (27 Nov 2023 04:39) (55 - 73)  BP: 130/80 (27 Nov 2023 04:39) (130/80 - 147/70)  BP(mean): --  RR: 18 (27 Nov 2023 04:39) (18 - 18)  SpO2: 94% (27 Nov 2023 04:39) (94% - 99%)    Parameters below as of 27 Nov 2023 04:39  Patient On (Oxygen Delivery Method): room air        CONSTITUTIONAL: NAD  HEENT: MMM, EOMI, PERRLA  NECK: supple  RESPIRATORY: Normal respiratory effort; lungs are clear to auscultation bilaterally  CARDIOVASCULAR: Regular rate and rhythm, normal S1 and S2, no murmur/rub/gallop; No lower extremity edema  ABDOMEN: Nontender to palpation, normoactive bowel sounds, no rebound/guarding; No hepatosplenomegaly  MUSCULOSKELETAL: no clubbing or cyanosis of digits; no joint swelling or tenderness to palpation  NEURO: alert and oriented to person, place, and time. Moving all four extremities, sensation grossly intact  PSYCH: alert and oriented to person, place, and time; affect appropriate  SKIN: No rash    LABS:                        9.0    1.48  )-----------( 210      ( 27 Nov 2023 05:10 )             27.3     11-27    141  |  107  |  40<H>  ----------------------------<  94  4.4   |  22  |  2.51<H>    Ca    8.3<L>      27 Nov 2023 05:09  Phos  4.5     11-27  Mg     2.4     11-27    TPro  6.8  /  Alb  3.7  /  TBili  0.4  /  DBili  x   /  AST  49<H>  /  ALT  54<H>  /  AlkPhos  210<H>  11-27          Urinalysis Basic - ( 27 Nov 2023 05:09 )    Color: x / Appearance: x / SG: x / pH: x  Gluc: 94 mg/dL / Ketone: x  / Bili: x / Urobili: x   Blood: x / Protein: x / Nitrite: x   Leuk Esterase: x / RBC: x / WBC x   Sq Epi: x / Non Sq Epi: x / Bacteria: x          Tele Reviewed:    RADIOLOGY & ADDITIONAL TESTS:  Results Reviewed:   Imaging Personally Reviewed:  Electrocardiogram Personally Reviewed:     PROGRESS NOTE:   Authored by Dr. Michelle Bravo MD (PGY-3). Pager [facility] [phone]/ [facility] [id number]    Patient is a 83y old  Male who presents with a chief complaint of acute hypoxic respiratory failure (26 Nov 2023 11:28)      SUBJECTIVE / OVERNIGHT EVENTS:  No acute events overnight.     Feeling well. Denies chest pain, SOB, palpitations. Last BM yesterday. No longer having diarrhea    tele: NSR, HR 50-60s  MEDICATIONS  (STANDING):  acyclovir   Oral Tab/Cap 200 milliGRAM(s) Oral every 12 hours  aspirin enteric coated 81 milliGRAM(s) Oral daily  atorvastatin 80 milliGRAM(s) Oral at bedtime  chlorhexidine 2% Cloths 1 Application(s) Topical daily  clopidogrel Tablet 75 milliGRAM(s) Oral daily  furosemide    Tablet 40 milliGRAM(s) Oral daily  gabapentin 300 milliGRAM(s) Oral daily  metoprolol succinate  milliGRAM(s) Oral daily  pantoprazole    Tablet 40 milliGRAM(s) Oral before breakfast  sertraline 25 milliGRAM(s) Oral daily    MEDICATIONS  (PRN):  aluminum hydroxide/magnesium hydroxide/simethicone Suspension 30 milliLiter(s) Oral every 6 hours PRN Dyspepsia  cyclobenzaprine 5 milliGRAM(s) Oral three times a day PRN Muscle Spasm      CAPILLARY BLOOD GLUCOSE        I&O's Summary    26 Nov 2023 07:01  -  27 Nov 2023 07:00  --------------------------------------------------------  IN: 650 mL / OUT: 1475 mL / NET: -825 mL        PHYSICAL EXAM:  Vital Signs Last 24 Hrs  T(C): 36.4 (27 Nov 2023 04:39), Max: 36.6 (26 Nov 2023 12:07)  T(F): 97.6 (27 Nov 2023 04:39), Max: 97.9 (26 Nov 2023 12:07)  HR: 67 (27 Nov 2023 04:39) (55 - 73)  BP: 130/80 (27 Nov 2023 04:39) (130/80 - 147/70)  BP(mean): --  RR: 18 (27 Nov 2023 04:39) (18 - 18)  SpO2: 94% (27 Nov 2023 04:39) (94% - 99%)    Parameters below as of 27 Nov 2023 04:39  Patient On (Oxygen Delivery Method): room air        CONSTITUTIONAL: NAD  HEENT: MMM, EOMI, PERRLA  NECK: supple  RESPIRATORY: Normal respiratory effort; decreased BS at bilateral bases with crackles, no wheezing or rhonchi  CARDIOVASCULAR: Regular rate and rhythm, normal S1 and S2, no murmur/rub/gallop; No lower extremity edema  ABDOMEN: Nontender to palpation, normoactive bowel sounds, no rebound/guarding; No hepatosplenomegaly  MUSCULOSKELETAL: no clubbing or cyanosis of digits; no joint swelling or tenderness to palpation  NEURO: alert and oriented to person, place, and time. Moving all four extremities, sensation grossly intact  SKIN: No rash    LABS:                        9.0    1.48  )-----------( 210      ( 27 Nov 2023 05:10 )             27.3     11-27    141  |  107  |  40<H>  ----------------------------<  94  4.4   |  22  |  2.51<H>    Ca    8.3<L>      27 Nov 2023 05:09  Phos  4.5     11-27  Mg     2.4     11-27    TPro  6.8  /  Alb  3.7  /  TBili  0.4  /  DBili  x   /  AST  49<H>  /  ALT  54<H>  /  AlkPhos  210<H>  11-27          Urinalysis Basic - ( 27 Nov 2023 05:09 )    Color: x / Appearance: x / SG: x / pH: x  Gluc: 94 mg/dL / Ketone: x  / Bili: x / Urobili: x   Blood: x / Protein: x / Nitrite: x   Leuk Esterase: x / RBC: x / WBC x   Sq Epi: x / Non Sq Epi: x / Bacteria: x          Tele Reviewed:    RADIOLOGY & ADDITIONAL TESTS:  Results Reviewed:   Imaging Personally Reviewed:  Electrocardiogram Personally Reviewed:

## 2023-11-27 NOTE — DISCHARGE NOTE PROVIDER - NSDCFUSCHEDAPPT_GEN_ALL_CORE_FT
[facility]  [facility]  Scheduled Appointment: 12/05/2023    [facility]  [facility]  Scheduled Appointment: 12/08/2023    [facility]  [facility]  Scheduled Appointment: 12/12/2023    [facility]  [facility]  Scheduled Appointment: 12/15/2023    [facility]  [facility]  Scheduled Appointment: 12/19/2023    [facility]  [facility]  Scheduled Appointment: 12/22/2023    Gabriel Reddy  [facility]  CARDIOLOGY [address]  Scheduled Appointment: 01/12/2024    Maksim Reddy  [facility]  [facility] [address]  Scheduled Appointment: 01/16/2024     [facility]  [facility]  Scheduled Appointment: 12/05/2023    [facility]  Scheduled Appointment: 12/07/2023    Zita Mcmahon  [facility]  [facility]  Scheduled Appointment: 12/07/2023    [facility]  [facility]  Scheduled Appointment: 12/08/2023    [facility]  [facility]  Scheduled Appointment: 12/12/2023    [facility]  Scheduled Appointment: 12/15/2023    [facility]  Scheduled Appointment: 12/19/2023    [facility]  Scheduled Appointment: 12/22/2023    Gabriel Reddy  [facility]  CARDIOLOGY [address]  Scheduled Appointment: 01/12/2024    Maksim Reddy  [facility]  [facility] [address]  Scheduled Appointment: 01/16/2024

## 2023-11-27 NOTE — DISCHARGE NOTE PROVIDER - NSDCFUADDAPPT_GEN_ALL_CORE_FT
APPTS ARE READY TO BE MADE: [ ] YES    Best Family or Patient Contact (if needed):    Additional Information about above appointments (if needed):    1: Dr. Gabriel Reddy, within 1 week  2: Dr. Maksim Reddy, within 2 weeks  3:     Other comments or requests:    APPTS ARE READY TO BE MADE: [X] YES    Best Family or Patient Contact (if needed):    Additional Information about above appointments (if needed):    1: Dr. Gabriel Reddy, within 1 week  2: Dr. Edis Cabral, within 1 week  3. Dr. Maksim Reddy, within 2 weeks  3:     Other comments or requests:    APPTS ARE READY TO BE MADE: [X] YES    Best Family or Patient Contact (if needed):    Additional Information about above appointments (if needed):    1: Dr. Gabriel Reddy, within 1 week  2: Dr. Edis Cabral, within 1 week  3. Dr. Maksim Reddy, within 2 weeks  3:     Other comments or requests:   Patient was previously scheduled for an appointment on 12/05 2:40p at [address] [place] [address] with Hem/ Onc  Patient/Caregiver was provided with follow up request details and prefers to call the providers office on their own to schedule.

## 2023-11-27 NOTE — PROGRESS NOTE ADULT - ASSESSMENT
Patient is an 83 year old M with gout, myelodysplastic syndrome, coronary artery disease, carotid artery stenosis, hypertension, hyperlipidemia, chronic kidney disease stage III, renal artery stenosis (s/p left renal artery stent), spinal stenosis, and atrial tachycardia who presents for hypoxia noted at his hematology clinic. Patient admitted for acute hypoxemic respiratory failure likely 2/2 to pulmonary edema from new acute heart failure with EF 40%. Transitioned from IV to PO lasix and Cr decreasing.

## 2023-11-27 NOTE — PROGRESS NOTE ADULT - PROBLEM SELECTOR PLAN 2
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 Cabral (private nephro)  - FeUrea 58.3%, c/w intrinsic kidney disease    Plan:  > improving. 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 Cabral (private nephro)  - FeUrea 58.3%, c/w intrinsic kidney disease    Plan:  > improving. Cr 3.76-->3.52-->3.16-->2.67  > transitioned to lasix 40 mg PO qD 11/25  > kidney and bladder US: atrophic right kidney, no hydronephrosis  > monitor urine output  > avoid nephrotoxic meds

## 2023-11-27 NOTE — DISCHARGE NOTE PROVIDER - NSDCCPTREATMENT_GEN_ALL_CORE_FT
PRINCIPAL PROCEDURE  Procedure: Transthoracic echo  Findings and Treatment: CONCLUSIONS:      1. Technically difficult image quality.   2. Left ventricular systolic function is moderately decreased with an ejection fraction of 40 % by Andersen's method of disks.   3. Basal and mid anterolateral wall, basal and mid inferior wall, basal and mid inferolateral wall, and basal anteroseptal segment are abnormal.   4. There is moderate (grade 2) left ventricular diastolic dysfunction, with elevated filling pressure.   5. Normal right ventricular cavity size and systolic function.   6. There is moderate aortic stenosis. The peak transaortic velocity is 3.00 m/s, peak transaortic gradient is 36.0 mmHg and mean transaortic gradient is 23.0 mmHg with an LVOT/aortic valve VTI ratio of 0.34. The aortic valve area is estimated at 1.06 cm² by the continuity equation. There is no evidence of aortic regurgitation.        ________________________________________________________________________________________

## 2023-11-27 NOTE — PROGRESS NOTE ADULT - SUBJECTIVE AND OBJECTIVE BOX
SUBJ:      MEDICATIONS  (STANDING):  acyclovir   Oral Tab/Cap 200 milliGRAM(s) Oral every 12 hours  aspirin enteric coated 81 milliGRAM(s) Oral daily  atorvastatin 80 milliGRAM(s) Oral at bedtime  chlorhexidine 2% Cloths 1 Application(s) Topical daily  clopidogrel Tablet 75 milliGRAM(s) Oral daily  furosemide    Tablet 40 milliGRAM(s) Oral daily  gabapentin 300 milliGRAM(s) Oral daily  heparin   Injectable 5000 Unit(s) SubCutaneous every 8 hours  metoprolol succinate  milliGRAM(s) Oral daily  pantoprazole    Tablet 40 milliGRAM(s) Oral before breakfast  sertraline 25 milliGRAM(s) Oral daily    MEDICATIONS  (PRN):  aluminum hydroxide/magnesium hydroxide/simethicone Suspension 30 milliLiter(s) Oral every 6 hours PRN Dyspepsia  cyclobenzaprine 5 milliGRAM(s) Oral three times a day PRN Muscle Spasm            Vital Signs Last 24 Hrs  T(C): 37.1 (27 Nov 2023 07:55), Max: 37.1 (27 Nov 2023 07:55)  T(F): 98.7 (27 Nov 2023 07:55), Max: 98.7 (27 Nov 2023 07:55)  HR: 69 (27 Nov 2023 07:55) (55 - 73)  BP: 132/76 (27 Nov 2023 07:55) (130/80 - 147/70)  BP(mean): --  RR: 18 (27 Nov 2023 07:55) (18 - 18)  SpO2: 97% (27 Nov 2023 07:55) (94% - 99%)    Parameters below as of 27 Nov 2023 07:55  Patient On (Oxygen Delivery Method): room air        REVIEW OF SYSTEMS:  CONSTITUTIONAL: No fever, weight loss, or fatigue  EYES: No eye pain, visual disturbances, or discharge  ENMT:  No difficulty hearing, tinnitus, vertigo; No sinus or throat pain  NECK: No pain or stiffness  RESPIRATORY: No cough, wheezing, chills or hemoptysis; No shortness of breath  CARDIOVASCULAR: No chest pain, palpitations, dizziness, or leg swelling  GASTROINTESTINAL: No abdominal or epigastric pain. No nausea, vomiting, or hematemesis; No diarrhea or constipation. No melena or hematochezia.  GENITOURINARY: No dysuria, frequency, hematuria, or incontinence  NEUROLOGICAL: No headaches, memory loss, loss of strength, numbness, or tremors  SKIN: No itching, burning, rashes, or lesions   LYMPH NODES: No enlarged glands  ENDOCRINE: No heat or cold intolerance; No hair loss  MUSCULOSKELETAL: No joint pain or swelling; No muscle, back, or extremity pain  PSYCHIATRIC: No depression, anxiety, mood swings, or difficulty sleeping  HEME/LYMPH: No easy bruising, or bleeding gums  ALLERY AND IMMUNOLOGIC: No hives or eczema    PHYSICAL EXAM:  · CONSTITUTIONAL:	Well-developed, well nourished    BMI-  · EYES:	EOMI; PERRL; no drainage or redness  · ENMT	No oral lesions; no gross abnormalities  · NECK:	No bruits; no thyromegaly or nodules  ·BACK:	No deformity or limitation of movement  ·RESPIRATORY:   airway patent; breath sounds equal; good air movement; respirations non-labored; clear to auscultation bilaterally; no chest wall tenderness; no intercostal retractions; no rales,rhonchi or wheeze  · CARDIOVASCULAR	regular rate and rhythm  no rub  no murmur  normal PMI  . GASTROINTESTINAL:  no distention; no masses palpable; bowel sounds normal; no rebound tenderness; no guarding; no rigidity; no organomegaly  · EXTREMITIES: No cyanosis, clubbing or edema  · VASCULAR: 	Equal and normal pulses (carotid, femoral, dorsalis pedis)  ·NEUROLOGICAL:   alert and oriented x 3; sensation intact; deep reflexes intact; cranial nerves intact; no spontaneous movement; superficial reflexes intact; normal strength  · SKIN:	No lesions; no rash  . LYMPH NODES:	No lymphadedenopathy  · MUSCULOSKELETAL:   No calf tenderness  no joint swelling	  TELEMETRY:    ECG:    TTE:    LABS:                        9.0    1.48  )-----------( 210      ( 27 Nov 2023 05:10 )             27.3     11-27    141  |  107  |  40<H>  ----------------------------<  94  4.4   |  22  |  2.51<H>    Ca    8.3<L>      27 Nov 2023 05:09  Phos  4.5     11-27  Mg     2.4     11-27    TPro  6.8  /  Alb  3.7  /  TBili  0.4  /  DBili  x   /  AST  49<H>  /  ALT  54<H>  /  AlkPhos  210<H>  11-27          Creatinine Trend: 2.51<--, 2.67<--, 3.16<--, 3.52<--, 3.76<--, 3.58<--  I&O's Summary    26 Nov 2023 07:01  -  27 Nov 2023 07:00  --------------------------------------------------------  IN: 650 mL / OUT: 1475 mL / NET: -825 mL      BNP  RADIOLOGY & ADDITIONAL STUDIES:    IMPRESSION AND PLAN:         SUBJ: Tele: no acute events  Overnight: No acute events. Pt reports feeling well, now off nasal cannula. Denied CP, SOB, palpitations, leg edema. Reports feeling well.      MEDICATIONS  (STANDING):  acyclovir   Oral Tab/Cap 200 milliGRAM(s) Oral every 12 hours  aspirin enteric coated 81 milliGRAM(s) Oral daily  atorvastatin 80 milliGRAM(s) Oral at bedtime  chlorhexidine 2% Cloths 1 Application(s) Topical daily  clopidogrel Tablet 75 milliGRAM(s) Oral daily  furosemide    Tablet 40 milliGRAM(s) Oral daily  gabapentin 300 milliGRAM(s) Oral daily  heparin   Injectable 5000 Unit(s) SubCutaneous every 8 hours  metoprolol succinate  milliGRAM(s) Oral daily  pantoprazole    Tablet 40 milliGRAM(s) Oral before breakfast  sertraline 25 milliGRAM(s) Oral daily    MEDICATIONS  (PRN):  aluminum hydroxide/magnesium hydroxide/simethicone Suspension 30 milliLiter(s) Oral every 6 hours PRN Dyspepsia  cyclobenzaprine 5 milliGRAM(s) Oral three times a day PRN Muscle Spasm            Vital Signs Last 24 Hrs  T(C): 37.1 (27 Nov 2023 07:55), Max: 37.1 (27 Nov 2023 07:55)  T(F): 98.7 (27 Nov 2023 07:55), Max: 98.7 (27 Nov 2023 07:55)  HR: 69 (27 Nov 2023 07:55) (55 - 73)  BP: 132/76 (27 Nov 2023 07:55) (130/80 - 147/70)  BP(mean): --  RR: 18 (27 Nov 2023 07:55) (18 - 18)  SpO2: 97% (27 Nov 2023 07:55) (94% - 99%)    Parameters below as of 27 Nov 2023 07:55  Patient On (Oxygen Delivery Method): room air        REVIEW OF SYSTEMS:  CONSTITUTIONAL: No fever, weight loss, or fatigue  EYES: No eye pain, visual disturbances, or discharge  ENMT:  No difficulty hearing, tinnitus, vertigo; No sinus or throat pain  NECK: No pain or stiffness  RESPIRATORY: No cough, wheezing, chills or hemoptysis; No shortness of breath  CARDIOVASCULAR: No chest pain, palpitations, dizziness, or leg swelling  GASTROINTESTINAL: No abdominal or epigastric pain. No nausea, vomiting, or hematemesis; No diarrhea or constipation. No melena or hematochezia.  GENITOURINARY: No dysuria, frequency, hematuria, or incontinence  NEUROLOGICAL: No headaches, memory loss, loss of strength, numbness, or tremors  SKIN: No itching, burning, rashes, or lesions   LYMPH NODES: No enlarged glands  ENDOCRINE: No heat or cold intolerance; No hair loss  MUSCULOSKELETAL: No joint pain or swelling; No muscle, back, or extremity pain  PSYCHIATRIC: No depression, anxiety, mood swings, or difficulty sleeping  HEME/LYMPH: No easy bruising, or bleeding gums  ALLERY AND IMMUNOLOGIC: No hives or eczema    PHYSICAL EXAM:  · CONSTITUTIONAL:	Well-developed, well nourished    BMI-  · EYES:	EOMI; PERRL; no drainage or redness  · ENMT	No oral lesions; no gross abnormalities  · NECK:	No bruits; no thyromegaly or nodules  ·BACK:	No deformity or limitation of movement  ·RESPIRATORY:   airway patent; breath sounds equal; good air movement; respirations non-labored; clear to auscultation bilaterally; no chest wall tenderness; no intercostal retractions; no rales,rhonchi or wheeze  · CARDIOVASCULAR	regular rate and rhythm  no rub  no murmur  normal PMI  . GASTROINTESTINAL:  no distention; no masses palpable; bowel sounds normal; no rebound tenderness; no guarding; no rigidity; no organomegaly  · EXTREMITIES: No cyanosis, clubbing or edema  · VASCULAR: 	Equal and normal pulses (carotid, femoral, dorsalis pedis)  ·NEUROLOGICAL:   alert and oriented x 3; sensation intact; deep reflexes intact; cranial nerves intact; no spontaneous movement; superficial reflexes intact; normal strength  · SKIN:	No lesions; no rash  . LYMPH NODES:	No lymphadedenopathy  · MUSCULOSKELETAL:   No calf tenderness  no joint swelling	  TELEMETRY:    ECG:    TTE:    LABS:                        9.0    1.48  )-----------( 210      ( 27 Nov 2023 05:10 )             27.3     11-27    141  |  107  |  40<H>  ----------------------------<  94  4.4   |  22  |  2.51<H>    Ca    8.3<L>      27 Nov 2023 05:09  Phos  4.5     11-27  Mg     2.4     11-27    TPro  6.8  /  Alb  3.7  /  TBili  0.4  /  DBili  x   /  AST  49<H>  /  ALT  54<H>  /  AlkPhos  210<H>  11-27          Creatinine Trend: 2.51<--, 2.67<--, 3.16<--, 3.52<--, 3.76<--, 3.58<--  I&O's Summary    26 Nov 2023 07:01  -  27 Nov 2023 07:00  --------------------------------------------------------  IN: 650 mL / OUT: 1475 mL / NET: -825 mL      BNP  RADIOLOGY & ADDITIONAL STUDIES:    IMPRESSION AND PLAN:         SUBJ: Tele: no acute events  Overnight: No acute events. Pt reports feeling well, now off nasal cannula. Denied CP, SOB, palpitations, leg edema. Reports feeling well.      MEDICATIONS  (STANDING):  acyclovir   Oral Tab/Cap 200 milliGRAM(s) Oral every 12 hours  aspirin enteric coated 81 milliGRAM(s) Oral daily  atorvastatin 80 milliGRAM(s) Oral at bedtime  chlorhexidine 2% Cloths 1 Application(s) Topical daily  clopidogrel Tablet 75 milliGRAM(s) Oral daily  furosemide    Tablet 40 milliGRAM(s) Oral daily  gabapentin 300 milliGRAM(s) Oral daily  heparin   Injectable 5000 Unit(s) SubCutaneous every 8 hours  metoprolol succinate  milliGRAM(s) Oral daily  pantoprazole    Tablet 40 milliGRAM(s) Oral before breakfast  sertraline 25 milliGRAM(s) Oral daily    MEDICATIONS  (PRN):  aluminum hydroxide/magnesium hydroxide/simethicone Suspension 30 milliLiter(s) Oral every 6 hours PRN Dyspepsia  cyclobenzaprine 5 milliGRAM(s) Oral three times a day PRN Muscle Spasm          REVIEW OF SYSTEMS:  CONSTITUTIONAL: No fever, weight loss, or fatigue  EYES: No eye pain, visual disturbances, or discharge  ENMT:  No difficulty hearing, tinnitus, vertigo; No sinus or throat pain  NECK: No pain or stiffness  RESPIRATORY: No cough, wheezing, chills or hemoptysis; No shortness of breath  CARDIOVASCULAR: No chest pain, palpitations, dizziness, or leg swelling  GASTROINTESTINAL: No abdominal or epigastric pain. No nausea, vomiting, or hematemesis; No diarrhea or constipation. No melena or hematochezia.  GENITOURINARY: No dysuria, frequency, hematuria, or incontinence  NEUROLOGICAL: No headaches, memory loss, loss of strength, numbness, or tremors  SKIN: No itching, burning, rashes, or lesions   LYMPH NODES: No enlarged glands  ENDOCRINE: No heat or cold intolerance; No hair loss  MUSCULOSKELETAL: No joint pain or swelling; No muscle, back, or extremity pain  PSYCHIATRIC: No depression, anxiety, mood swings, or difficulty sleeping  HEME/LYMPH: No easy bruising, or bleeding gums  ALLERGY AND IMMUNOLOGIC: No hives or eczema        Vital Signs Last 24 Hrs  T(C): 37.1 (27 Nov 2023 07:55), Max: 37.1 (27 Nov 2023 07:55)  T(F): 98.7 (27 Nov 2023 07:55), Max: 98.7 (27 Nov 2023 07:55)  HR: 69 (27 Nov 2023 07:55) (55 - 73)  BP: 132/76 (27 Nov 2023 07:55) (130/80 - 147/70)  RR: 18 (27 Nov 2023 07:55) (18 - 18)  SpO2: 97% (27 Nov 2023 07:55) (94% - 99%)    PHYSICAL EXAM:  · CONSTITUTIONAL:	Well-developed, well nourished    BMI-  · EYES:	EOMI; PERRL; no drainage or redness  · ENMT	No oral lesions; no gross abnormalities  · NECK:	No bruits; no thyromegaly or nodules  ·BACK:	No deformity or limitation of movement  ·RESPIRATORY:   airway patent; breath sounds equal; good air movement; respirations non-labored; clear to auscultation bilaterally; no chest wall tenderness; no intercostal retractions; no rales,rhonchi or wheeze  · CARDIOVASCULAR	regular rate and rhythm  no rub  no murmur  normal PMI  . GASTROINTESTINAL:  no distention; no masses palpable; bowel sounds normal; no rebound tenderness; no guarding; no rigidity; no organomegaly  · EXTREMITIES: No cyanosis, clubbing or edema  · VASCULAR: 	Equal and normal pulses (carotid, femoral, dorsalis pedis)  ·NEUROLOGICAL:   alert and oriented x 3; sensation intact; deep reflexes intact; cranial nerves intact; no spontaneous movement; superficial reflexes intact; normal strength  · SKIN:	No lesions; no rash  . LYMPH NODES:	No lymphadedenopathy  · MUSCULOSKELETAL:   No calf tenderness  no joint swelling      LABS:                      9.0    1.48  )-----------( 210      ( 27 Nov 2023 05:10 )             27.3     11-27  141  |  107  |  40<H>  ----------------------------<  94  4.4   |  22  |  2.51<H>    Ca    8.3<L>      27 Nov 2023 05:09  Phos  4.5     11-27  Mg     2.4     11-27    TPro  6.8  /  Alb  3.7  /  TBili  0.4  /  DBili  x   /  AST  49<H>  /  ALT  54<H>  /  AlkPhos  210<H>  11-27    Creatinine Trend: 2.51<--, 2.67<--, 3.16<--, 3.52<--, 3.76<--, 3.58<--    I&O's Summary  26 Nov 2023 07:01  -  27 Nov 2023 07:00  --------------------------------------------------------  IN: 650 mL / OUT: 1475 mL / NET: -825 mL

## 2023-11-27 NOTE — PROGRESS NOTE ADULT - PROBLEM SELECTOR PLAN 3
Patient follows with Dr. Zita Mcmahon at [facility]  - was previously on lenolidamide but held given pancytopenia. Last chemo on 10/30 with decitabine   - patient currently pancytopenic. but appears to be near baseline labs per outpt review  - receiving procrit injections with improvement in pancytopenia    Plan:  > continue with acyclovir for ppx  > holding fluconazole due to prolonged QTC  > hematology on board, appreciate recs  > maintain Hgb>8 given history of CAD. s/p 1 unit of pRBC

## 2023-11-27 NOTE — PROGRESS NOTE ADULT - PROBLEM SELECTOR PLAN 5
Patient found to have prolonged QTc to 505  - may be falsely elevated in the setting of tachycardia with significant ectopic beats  - on fluconazole at home    Plan:  > QTc 517 11/23. hold home fluconazole  > Maintain K>4, Mg >2  > avoid QTc prolonging meds

## 2023-11-27 NOTE — PROGRESS NOTE ADULT - PROBLEM SELECTOR PLAN 4
- Troponins elevated, downtrending  - Likely due to Type 2 MI from demand ischemia in the setting of hypoxia and reduced clearance in the setting of CKD  - Need for cath as above

## 2023-11-27 NOTE — DISCHARGE NOTE PROVIDER - NSDCMRMEDTOKEN_GEN_ALL_CORE_FT
acyclovir 400 mg oral tablet: 1 tab(s) orally 2 times a day  allopurinol 100 mg oral tablet: 1 tab(s) orally once a day   amLODIPine 5 mg oral tablet: 1 tab(s) orally once a day  aspirin 81 mg oral capsule: 1 cap(s) orally once a day  cholecalciferol oral tablet: 2000 unit(s) orally once a day  cilostazol 100 mg oral tablet: 1 tab(s) orally 2 times a day  cyclobenzaprine 5 mg oral tablet: 1 tab(s) orally 3 times a day as needed for  back pain  fluconazole 200 mg oral tablet: 1 tab(s) orally once a day  furosemide 40 mg oral tablet: 1 tab(s) orally once a day  gabapentin 100 mg oral capsule: 3 cap(s) orally 3 times a day  metoprolol succinate 50 mg oral tablet, extended release: 1 tab(s) orally once a day  omeprazole 20 mg oral delayed release tablet: 1 tab(s) orally once a day  Plavix 75 mg oral tablet: 1 tab(s) orally once a day  rosuvastatin 10 mg oral tablet: 1 tab(s) orally once a day  sertraline 25 mg oral tablet: 1 tab(s) orally once a day (at bedtime)   acyclovir 400 mg oral tablet: 1 tab(s) orally 2 times a day  allopurinol 100 mg oral tablet: 1 tab(s) orally once a day   amLODIPine 5 mg oral tablet: 1 tab(s) orally once a day  aspirin 81 mg oral capsule: 1 cap(s) orally once a day  cholecalciferol oral tablet: 2000 unit(s) orally once a day  cyclobenzaprine 5 mg oral tablet: 1 tab(s) orally 3 times a day as needed for  back pain  fluconazole 200 mg oral tablet: 1 tab(s) orally once a day  furosemide 40 mg oral tablet: 1 tab(s) orally once a day  gabapentin 100 mg oral capsule: 3 cap(s) orally 3 times a day  metoprolol succinate 50 mg oral tablet, extended release: 3 tab(s) orally once a day  omeprazole 20 mg oral delayed release tablet: 1 tab(s) orally once a day  Plavix 75 mg oral tablet: 1 tab(s) orally once a day  rosuvastatin 10 mg oral tablet: 1 tab(s) orally once a day  sertraline 25 mg oral tablet: 1 tab(s) orally once a day (at bedtime)

## 2023-11-27 NOTE — DISCHARGE NOTE PROVIDER - PROVIDER TOKENS
PROVIDER:[TOKEN:[[id number]],FOLLOWUP:[1 week],ESTABLISHEDPATIENT:[T]],PROVIDER:[TOKEN:[[id number]],FOLLOWUP:[1 week],ESTABLISHEDPATIENT:[T]],PROVIDER:[TOKEN:[[id number]],FOLLOWUP:[1 week],ESTABLISHEDPATIENT:[T]] PROVIDER:[TOKEN:[[id number]],FOLLOWUP:[1 week],ESTABLISHEDPATIENT:[T]],PROVIDER:[TOKEN:[[id number]],FOLLOWUP:[1 week],ESTABLISHEDPATIENT:[T]],PROVIDER:[TOKEN:[[id number]],FOLLOWUP:[1 week],ESTABLISHEDPATIENT:[T]],PROVIDER:[TOKEN:[[id number]],FOLLOWUP:[1 week],ESTABLISHEDPATIENT:[T]]

## 2023-11-27 NOTE — DISCHARGE NOTE PROVIDER - HOSPITAL COURSE
HPI    Patient is an 83 year old M with gout, myelodysplastic syndrome, coronary artery disease, carotid artery stenosis, hypertension, hyperlipidemia, chronic kidney disease stage III, renal artery stenosis (s/p left renal artery stent), spinal stenosis, and atrial tachycardia who presents for hypoxia noted at his hematology clinic. The patient reports that he went to the hematology clinic for a regular platelet transfusion, and was incidentally noted to be hypoxic without any respiratory symptoms. He reports that a couple of nights ago, he had a night where he had persistent epigastric pain that kept him up at night, and for which he took several tums. Aside from that instance, the patient has been feeling well and has no acute complaints. He reports that he continues to have lower extremity edema, which he states is chronic, in addition to the chronic bilateral LE "tightness" with ambulation. The patient also reports that nearly 10 days ago he began having blistering of his feet with associated redness and some drainage. He was seen by his PCP on 11/13 for swollen feet with blistering and found to have suspected cellulitis of the legs. The patient was initially prescribed augmentin, but continued to have symptoms so was prescribed bactrim on 11/16. The patient reports that since then, he has felt well and has no acute complaints. He denies any orthopnea, PND, worsening LE edema, chest pain or shortness of breath.     ED Course:  VS: -120, -140/60-80s, Tmax 36.9C, SpO2 88% on 5L NC with tachypnea, improved on BIPAP. Patient given 1g cefepime and 60 of IV lasix.       Hospital Course   HPI    Patient is an 83 year old M with gout, myelodysplastic syndrome, coronary artery disease, carotid artery stenosis, hypertension, hyperlipidemia, chronic kidney disease stage III, renal artery stenosis (s/p left renal artery stent), spinal stenosis, and atrial tachycardia who presents for hypoxia noted at his hematology clinic. The patient reports that he went to the hematology clinic for a regular platelet transfusion, and was incidentally noted to be hypoxic without any respiratory symptoms. He reports that a couple of nights ago, he had a night where he had persistent epigastric pain that kept him up at night, and for which he took several tums. Aside from that instance, the patient has been feeling well and has no acute complaints. He reports that he continues to have lower extremity edema, which he states is chronic, in addition to the chronic bilateral LE "tightness" with ambulation. The patient also reports that nearly 10 days ago he began having blistering of his feet with associated redness and some drainage. He was seen by his PCP on 11/13 for swollen feet with blistering and found to have suspected cellulitis of the legs. The patient was initially prescribed augmentin, but continued to have symptoms so was prescribed bactrim on 11/16. The patient reports that since then, he has felt well and has no acute complaints. He denies any orthopnea, PND, worsening LE edema, chest pain or shortness of breath.     ED Course:  VS: -120, -140/60-80s, Tmax 36.9C, SpO2 88% on 5L NC with tachypnea, improved on BIPAP. Patient given 1g cefepime and 60 of IV lasix.       Hospital Course  Patient admitted for acute hypoxic respiratory failure due to decompensated heart failure. Labs remarkable for pancytopenia (QBC 1.63, Hgb 8.1, Plt 100) and proBNP 6700. TTE showed newly reduced EF of 40%, moderate LV diastolic dysfunction, moderate AS, normal RV size and function, basal and mid anterolateral wall, basal and mid inferior wall, basal and mid inferolateral wall, and basal anteroseptal segment are abnormal. CTA C/A/P showed small bilateral pleural effusions with mild interlobular septal thickening which may reflect mild pulmonary interstitial edema and compressive bilateral lower lobe atelectasis    Patient initially required BIPAP for increased work of breathing and tachypnea. He was started on IV diuretics and was eventually weaned to room air. Cardiology consulted for ischemic evaluation due to newly reduced EF seen on echocardiogram. Patient developed JUAN RAMON on CKD so LHC was deferred. JUAN RAMON thought to be secondary to contrast induced nephropathy. Kidney and bladder US showed atrophic right kidney, no hydronephrosis. On discharge, serum creatinine was 2.51.     Patient had intermittent episodes of atrial tachycardia while hospitalized. Home metoprolol succinate was increased to 150 mg XL.     Heme was consulted for pancytopenia and recommended no inpatient chemo. Patient received 1 unit of pRBCs to maintain Hgb>8. He showed no signs or symptoms of bleeding. Fluconazole for fungal ppx was held due to prolonged QTc. Acyclovir ppx dose was adjusted due to JUAN RAMON.     PT evaluated patient and recommended NERI. Patient declined NERI and is being discharged home with home PT.     Patient should follow up with his cardiologist, nephrologist, hematologist, and PCP within 1 week.     Code Status: DNR/DNI    Medication Changes:  -Gabapentin dose reduced to 300 mg twice daily due to worsening renal function  -Allopurinol dose reduced to 50 mg every other day due to worsening renal function  -Acyclovir dose reduced to 200 mg twice daily due to worsening kidney function  -Fluconazole stopped due to prolonged QTc  -Increase metoprolol succinate to 150 mg XL daily  -STOP cilostazol due to black box warning for heart failure HPI    Patient is an 83 year old M with gout, myelodysplastic syndrome, coronary artery disease, carotid artery stenosis, hypertension, hyperlipidemia, chronic kidney disease stage III, renal artery stenosis (s/p left renal artery stent), spinal stenosis, and atrial tachycardia who presents for hypoxia noted at his hematology clinic. The patient reports that he went to the hematology clinic for a regular platelet transfusion, and was incidentally noted to be hypoxic without any respiratory symptoms. He reports that a couple of nights ago, he had a night where he had persistent epigastric pain that kept him up at night, and for which he took several tums. Aside from that instance, the patient has been feeling well and has no acute complaints. He reports that he continues to have lower extremity edema, which he states is chronic, in addition to the chronic bilateral LE "tightness" with ambulation. The patient also reports that nearly 10 days ago he began having blistering of his feet with associated redness and some drainage. He was seen by his PCP on 11/13 for swollen feet with blistering and found to have suspected cellulitis of the legs. The patient was initially prescribed augmentin, but continued to have symptoms so was prescribed bactrim on 11/16. The patient reports that since then, he has felt well and has no acute complaints. He denies any orthopnea, PND, worsening LE edema, chest pain or shortness of breath.     ED Course:  VS: -120, -140/60-80s, Tmax 36.9C, SpO2 88% on 5L NC with tachypnea, improved on BIPAP. Patient given 1g cefepime and 60 of IV lasix.       Hospital Course  Patient admitted for acute hypoxic respiratory failure due to decompensated heart failure. Labs remarkable for pancytopenia (QBC 1.63, Hgb 8.1, Plt 100) and proBNP 6700. TTE showed newly reduced EF of 40%, moderate LV diastolic dysfunction, moderate AS, normal RV size and function, basal and mid anterolateral wall, basal and mid inferior wall, basal and mid inferolateral wall, and basal anteroseptal segment are abnormal. CTA C/A/P showed small bilateral pleural effusions with mild interlobular septal thickening which may reflect mild pulmonary interstitial edema and compressive bilateral lower lobe atelectasis    Patient initially required BIPAP for increased work of breathing and tachypnea. He was started on IV diuretics and was eventually weaned to room air. Cardiology consulted for ischemic evaluation due to newly reduced EF seen on echocardiogram. Patient developed JUAN RAMON on CKD so LHC was deferred. JUAN RAMON thought to be secondary to contrast induced nephropathy. Kidney and bladder US showed atrophic right kidney, no hydronephrosis. On discharge, serum creatinine was 2.24.     Patient had intermittent episodes of atrial tachycardia while hospitalized. Home metoprolol succinate was increased to 150 mg XL.     Heme was consulted for pancytopenia and recommended no inpatient chemo. Patient received 1 unit of pRBCs to maintain Hgb>8. He showed no signs or symptoms of bleeding.    PT evaluated patient and recommended NERI. Patient declined NERI and is being discharged home with home PT.     Patient should follow up with his cardiologist, nephrologist, hematologist, and PCP within 1 week.     Code Status: DNR/DNI    Medication Changes:  -Increase metoprolol succinate to 150 mg XL daily  -STOP cilostazol due to black box warning for heart failure

## 2023-11-28 ENCOUNTER — TRANSCRIPTION ENCOUNTER (OUTPATIENT)
Age: 83
End: 2023-11-28

## 2023-11-28 ENCOUNTER — APPOINTMENT (OUTPATIENT)
Dept: INFUSION THERAPY | Facility: HOSPITAL | Age: 83
End: 2023-11-28

## 2023-11-28 VITALS
RESPIRATION RATE: 18 BRPM | DIASTOLIC BLOOD PRESSURE: 66 MMHG | SYSTOLIC BLOOD PRESSURE: 111 MMHG | HEART RATE: 63 BPM | TEMPERATURE: 98 F | OXYGEN SATURATION: 96 %

## 2023-11-28 LAB
ALBUMIN SERPL ELPH-MCNC: 3.8 G/DL — SIGNIFICANT CHANGE UP (ref 3.3–5)
ALBUMIN SERPL ELPH-MCNC: 3.8 G/DL — SIGNIFICANT CHANGE UP (ref 3.3–5)
ALP SERPL-CCNC: 182 U/L — HIGH (ref 40–120)
ALP SERPL-CCNC: 182 U/L — HIGH (ref 40–120)
ALT FLD-CCNC: 33 U/L — SIGNIFICANT CHANGE UP (ref 10–45)
ALT FLD-CCNC: 33 U/L — SIGNIFICANT CHANGE UP (ref 10–45)
ANION GAP SERPL CALC-SCNC: 11 MMOL/L — SIGNIFICANT CHANGE UP (ref 5–17)
ANION GAP SERPL CALC-SCNC: 11 MMOL/L — SIGNIFICANT CHANGE UP (ref 5–17)
AST SERPL-CCNC: 20 U/L — SIGNIFICANT CHANGE UP (ref 10–40)
AST SERPL-CCNC: 20 U/L — SIGNIFICANT CHANGE UP (ref 10–40)
BILIRUB SERPL-MCNC: 0.5 MG/DL — SIGNIFICANT CHANGE UP (ref 0.2–1.2)
BILIRUB SERPL-MCNC: 0.5 MG/DL — SIGNIFICANT CHANGE UP (ref 0.2–1.2)
BUN SERPL-MCNC: 41 MG/DL — HIGH (ref 7–23)
BUN SERPL-MCNC: 41 MG/DL — HIGH (ref 7–23)
CALCIUM SERPL-MCNC: 8.9 MG/DL — SIGNIFICANT CHANGE UP (ref 8.4–10.5)
CALCIUM SERPL-MCNC: 8.9 MG/DL — SIGNIFICANT CHANGE UP (ref 8.4–10.5)
CHLORIDE SERPL-SCNC: 106 MMOL/L — SIGNIFICANT CHANGE UP (ref 96–108)
CHLORIDE SERPL-SCNC: 106 MMOL/L — SIGNIFICANT CHANGE UP (ref 96–108)
CO2 SERPL-SCNC: 23 MMOL/L — SIGNIFICANT CHANGE UP (ref 22–31)
CO2 SERPL-SCNC: 23 MMOL/L — SIGNIFICANT CHANGE UP (ref 22–31)
CREAT SERPL-MCNC: 2.24 MG/DL — HIGH (ref 0.5–1.3)
CREAT SERPL-MCNC: 2.24 MG/DL — HIGH (ref 0.5–1.3)
EGFR: 29 ML/MIN/1.73M2 — LOW
EGFR: 29 ML/MIN/1.73M2 — LOW
GLUCOSE SERPL-MCNC: 104 MG/DL — HIGH (ref 70–99)
GLUCOSE SERPL-MCNC: 104 MG/DL — HIGH (ref 70–99)
HCT VFR BLD CALC: 31 % — LOW (ref 39–50)
HCT VFR BLD CALC: 31 % — LOW (ref 39–50)
HGB BLD-MCNC: 10.3 G/DL — LOW (ref 13–17)
HGB BLD-MCNC: 10.3 G/DL — LOW (ref 13–17)
MAGNESIUM SERPL-MCNC: 2.3 MG/DL — SIGNIFICANT CHANGE UP (ref 1.6–2.6)
MAGNESIUM SERPL-MCNC: 2.3 MG/DL — SIGNIFICANT CHANGE UP (ref 1.6–2.6)
MCHC RBC-ENTMCNC: 33.2 GM/DL — SIGNIFICANT CHANGE UP (ref 32–36)
MCHC RBC-ENTMCNC: 33.2 GM/DL — SIGNIFICANT CHANGE UP (ref 32–36)
MCHC RBC-ENTMCNC: 37.2 PG — HIGH (ref 27–34)
MCHC RBC-ENTMCNC: 37.2 PG — HIGH (ref 27–34)
MCV RBC AUTO: 111.9 FL — HIGH (ref 80–100)
MCV RBC AUTO: 111.9 FL — HIGH (ref 80–100)
NRBC # BLD: 0 /100 WBCS — SIGNIFICANT CHANGE UP (ref 0–0)
NRBC # BLD: 0 /100 WBCS — SIGNIFICANT CHANGE UP (ref 0–0)
PHOSPHATE SERPL-MCNC: 4.1 MG/DL — SIGNIFICANT CHANGE UP (ref 2.5–4.5)
PHOSPHATE SERPL-MCNC: 4.1 MG/DL — SIGNIFICANT CHANGE UP (ref 2.5–4.5)
PLATELET # BLD AUTO: 242 K/UL — SIGNIFICANT CHANGE UP (ref 150–400)
PLATELET # BLD AUTO: 242 K/UL — SIGNIFICANT CHANGE UP (ref 150–400)
POTASSIUM SERPL-MCNC: 4.4 MMOL/L — SIGNIFICANT CHANGE UP (ref 3.5–5.3)
POTASSIUM SERPL-MCNC: 4.4 MMOL/L — SIGNIFICANT CHANGE UP (ref 3.5–5.3)
POTASSIUM SERPL-SCNC: 4.4 MMOL/L — SIGNIFICANT CHANGE UP (ref 3.5–5.3)
POTASSIUM SERPL-SCNC: 4.4 MMOL/L — SIGNIFICANT CHANGE UP (ref 3.5–5.3)
PROT SERPL-MCNC: 6.8 G/DL — SIGNIFICANT CHANGE UP (ref 6–8.3)
PROT SERPL-MCNC: 6.8 G/DL — SIGNIFICANT CHANGE UP (ref 6–8.3)
RBC # BLD: 2.77 M/UL — LOW (ref 4.2–5.8)
RBC # BLD: 2.77 M/UL — LOW (ref 4.2–5.8)
RBC # FLD: 23.5 % — HIGH (ref 10.3–14.5)
RBC # FLD: 23.5 % — HIGH (ref 10.3–14.5)
SODIUM SERPL-SCNC: 140 MMOL/L — SIGNIFICANT CHANGE UP (ref 135–145)
SODIUM SERPL-SCNC: 140 MMOL/L — SIGNIFICANT CHANGE UP (ref 135–145)
WBC # BLD: 1.48 K/UL — LOW (ref 3.8–10.5)
WBC # BLD: 1.48 K/UL — LOW (ref 3.8–10.5)
WBC # FLD AUTO: 1.48 K/UL — LOW (ref 3.8–10.5)
WBC # FLD AUTO: 1.48 K/UL — LOW (ref 3.8–10.5)

## 2023-11-28 PROCEDURE — 84484 ASSAY OF TROPONIN QUANT: CPT

## 2023-11-28 PROCEDURE — 84132 ASSAY OF SERUM POTASSIUM: CPT

## 2023-11-28 PROCEDURE — 82436 ASSAY OF URINE CHLORIDE: CPT

## 2023-11-28 PROCEDURE — 86901 BLOOD TYPING SEROLOGIC RH(D): CPT

## 2023-11-28 PROCEDURE — 87040 BLOOD CULTURE FOR BACTERIA: CPT

## 2023-11-28 PROCEDURE — 86850 RBC ANTIBODY SCREEN: CPT

## 2023-11-28 PROCEDURE — P9040: CPT

## 2023-11-28 PROCEDURE — 81001 URINALYSIS AUTO W/SCOPE: CPT

## 2023-11-28 PROCEDURE — 97530 THERAPEUTIC ACTIVITIES: CPT

## 2023-11-28 PROCEDURE — 86923 COMPATIBILITY TEST ELECTRIC: CPT

## 2023-11-28 PROCEDURE — 76770 US EXAM ABDO BACK WALL COMP: CPT

## 2023-11-28 PROCEDURE — C8929: CPT

## 2023-11-28 PROCEDURE — 84540 ASSAY OF URINE/UREA-N: CPT

## 2023-11-28 PROCEDURE — 84100 ASSAY OF PHOSPHORUS: CPT

## 2023-11-28 PROCEDURE — 82435 ASSAY OF BLOOD CHLORIDE: CPT

## 2023-11-28 PROCEDURE — 71045 X-RAY EXAM CHEST 1 VIEW: CPT

## 2023-11-28 PROCEDURE — 85610 PROTHROMBIN TIME: CPT

## 2023-11-28 PROCEDURE — 83880 ASSAY OF NATRIURETIC PEPTIDE: CPT

## 2023-11-28 PROCEDURE — 36415 COLL VENOUS BLD VENIPUNCTURE: CPT

## 2023-11-28 PROCEDURE — 85018 HEMOGLOBIN: CPT

## 2023-11-28 PROCEDURE — 99239 HOSP IP/OBS DSCHRG MGMT >30: CPT | Mod: GC

## 2023-11-28 PROCEDURE — 84300 ASSAY OF URINE SODIUM: CPT

## 2023-11-28 PROCEDURE — 80048 BASIC METABOLIC PNL TOTAL CA: CPT

## 2023-11-28 PROCEDURE — 93005 ELECTROCARDIOGRAM TRACING: CPT

## 2023-11-28 PROCEDURE — 97116 GAIT TRAINING THERAPY: CPT

## 2023-11-28 PROCEDURE — 86900 BLOOD TYPING SEROLOGIC ABO: CPT

## 2023-11-28 PROCEDURE — 82803 BLOOD GASES ANY COMBINATION: CPT

## 2023-11-28 PROCEDURE — 96365 THER/PROPH/DIAG IV INF INIT: CPT

## 2023-11-28 PROCEDURE — 87637 SARSCOV2&INF A&B&RSV AMP PRB: CPT

## 2023-11-28 PROCEDURE — 80061 LIPID PANEL: CPT

## 2023-11-28 PROCEDURE — 85025 COMPLETE CBC W/AUTO DIFF WBC: CPT

## 2023-11-28 PROCEDURE — 87641 MR-STAPH DNA AMP PROBE: CPT

## 2023-11-28 PROCEDURE — 85730 THROMBOPLASTIN TIME PARTIAL: CPT

## 2023-11-28 PROCEDURE — 74174 CTA ABD&PLVS W/CONTRAST: CPT | Mod: MA

## 2023-11-28 PROCEDURE — 83605 ASSAY OF LACTIC ACID: CPT

## 2023-11-28 PROCEDURE — 99285 EMERGENCY DEPT VISIT HI MDM: CPT

## 2023-11-28 PROCEDURE — 82550 ASSAY OF CK (CPK): CPT

## 2023-11-28 PROCEDURE — 83036 HEMOGLOBIN GLYCOSYLATED A1C: CPT

## 2023-11-28 PROCEDURE — 85027 COMPLETE CBC AUTOMATED: CPT

## 2023-11-28 PROCEDURE — 71275 CT ANGIOGRAPHY CHEST: CPT | Mod: MA

## 2023-11-28 PROCEDURE — 83735 ASSAY OF MAGNESIUM: CPT

## 2023-11-28 PROCEDURE — 80053 COMPREHEN METABOLIC PANEL: CPT

## 2023-11-28 PROCEDURE — 97161 PT EVAL LOW COMPLEX 20 MIN: CPT

## 2023-11-28 PROCEDURE — 82947 ASSAY GLUCOSE BLOOD QUANT: CPT

## 2023-11-28 PROCEDURE — 84295 ASSAY OF SERUM SODIUM: CPT

## 2023-11-28 PROCEDURE — 87640 STAPH A DNA AMP PROBE: CPT

## 2023-11-28 PROCEDURE — 85014 HEMATOCRIT: CPT

## 2023-11-28 PROCEDURE — 82330 ASSAY OF CALCIUM: CPT

## 2023-11-28 PROCEDURE — 84550 ASSAY OF BLOOD/URIC ACID: CPT

## 2023-11-28 PROCEDURE — 82570 ASSAY OF URINE CREATININE: CPT

## 2023-11-28 PROCEDURE — 94660 CPAP INITIATION&MGMT: CPT

## 2023-11-28 PROCEDURE — 36430 TRANSFUSION BLD/BLD COMPNT: CPT

## 2023-11-28 PROCEDURE — 84133 ASSAY OF URINE POTASSIUM: CPT

## 2023-11-28 PROCEDURE — 96375 TX/PRO/DX INJ NEW DRUG ADDON: CPT

## 2023-11-28 RX ORDER — FLUCONAZOLE 150 MG/1
200 TABLET ORAL DAILY
Refills: 0 | Status: DISCONTINUED | OUTPATIENT
Start: 2023-11-28 | End: 2023-11-28

## 2023-11-28 RX ORDER — FUROSEMIDE 40 MG
1 TABLET ORAL
Refills: 0 | DISCHARGE

## 2023-11-28 RX ORDER — ACYCLOVIR SODIUM 500 MG
400 VIAL (EA) INTRAVENOUS
Refills: 0 | Status: DISCONTINUED | OUTPATIENT
Start: 2023-11-28 | End: 2023-11-28

## 2023-11-28 RX ORDER — GABAPENTIN 400 MG/1
300 CAPSULE ORAL THREE TIMES A DAY
Refills: 0 | Status: DISCONTINUED | OUTPATIENT
Start: 2023-11-28 | End: 2023-11-28

## 2023-11-28 RX ORDER — ALLOPURINOL 300 MG
50 TABLET ORAL
Refills: 0 | Status: DISCONTINUED | OUTPATIENT
Start: 2023-11-28 | End: 2023-11-28

## 2023-11-28 RX ORDER — ALLOPURINOL 300 MG
100 TABLET ORAL DAILY
Refills: 0 | Status: DISCONTINUED | OUTPATIENT
Start: 2023-11-28 | End: 2023-11-28

## 2023-11-28 RX ORDER — METOPROLOL TARTRATE 50 MG
1 TABLET ORAL
Refills: 0 | DISCHARGE

## 2023-11-28 RX ORDER — FUROSEMIDE 40 MG
1 TABLET ORAL
Qty: 30 | Refills: 0
Start: 2023-11-28 | End: 2023-12-27

## 2023-11-28 RX ORDER — CILOSTAZOL 100 MG/1
1 TABLET ORAL
Qty: 0 | Refills: 0 | DISCHARGE

## 2023-11-28 RX ORDER — METOPROLOL TARTRATE 50 MG
3 TABLET ORAL
Qty: 90 | Refills: 0
Start: 2023-11-28 | End: 2023-12-27

## 2023-11-28 RX ADMIN — CLOPIDOGREL BISULFATE 75 MILLIGRAM(S): 75 TABLET, FILM COATED ORAL at 12:04

## 2023-11-28 RX ADMIN — Medication 200 MILLIGRAM(S): at 05:56

## 2023-11-28 RX ADMIN — Medication 40 MILLIGRAM(S): at 05:56

## 2023-11-28 RX ADMIN — GABAPENTIN 300 MILLIGRAM(S): 400 CAPSULE ORAL at 05:56

## 2023-11-28 RX ADMIN — CHLORHEXIDINE GLUCONATE 1 APPLICATION(S): 213 SOLUTION TOPICAL at 12:04

## 2023-11-28 RX ADMIN — SERTRALINE 25 MILLIGRAM(S): 25 TABLET, FILM COATED ORAL at 12:05

## 2023-11-28 RX ADMIN — Medication 100 MILLIGRAM(S): at 12:07

## 2023-11-28 RX ADMIN — PANTOPRAZOLE SODIUM 40 MILLIGRAM(S): 20 TABLET, DELAYED RELEASE ORAL at 05:56

## 2023-11-28 RX ADMIN — FLUCONAZOLE 200 MILLIGRAM(S): 150 TABLET ORAL at 13:05

## 2023-11-28 RX ADMIN — Medication 150 MILLIGRAM(S): at 05:55

## 2023-11-28 RX ADMIN — GABAPENTIN 300 MILLIGRAM(S): 400 CAPSULE ORAL at 13:05

## 2023-11-28 RX ADMIN — Medication 81 MILLIGRAM(S): at 12:04

## 2023-11-28 RX ADMIN — HEPARIN SODIUM 5000 UNIT(S): 5000 INJECTION INTRAVENOUS; SUBCUTANEOUS at 05:56

## 2023-11-28 NOTE — PROGRESS NOTE ADULT - PROVIDER SPECIALTY LIST ADULT
Cardiology
Nephrology
Cardiology
Nephrology
Cardiology
Internal Medicine
Nephrology
Internal Medicine

## 2023-11-28 NOTE — PROGRESS NOTE ADULT - ATTENDING COMMENTS
--------------  Myelodysplastic syndrome on lenalidomide and decitabine  Obstructive CAD  Acute systolic heart failure  Pulmonary edema  Acute on chronic renal insufficiency with elevated creatinine    Symptomatically, he has improved and feels less short of breath.  To continue diuresis with monitoring of Creatinine  To discuss role of revascularization given complex CAD, MDS pending stabilization of renal function  Repeat chest CXR or non-contrast CT    UBALDO Dennis MD [facility]  Cardiology
84 y/o man with MDS, CAD, carotid artery stenosis, hypertension, hyperlipidemia, CKD, USHA, s/p stent, presenting with volume overload with episodic lower chest pain with new WMA on TTE and reduced LVEF.   --Patient appears comfortable lying flat in bed with nasal cannula.  --Continue with diuresis, strict Is and Os and daily weights.  --Optimization of GDMT somewhat limited by declining renal function with Cr up to 3.76 today; Renal input noted--continue to monitor.  --Patient with known extensive CAD with prior LHC revealing pRCA 100% , mLAD 60%, OM1 60% - medically managed at the time.    --Optimize HF and Cr before assessing ischemic evaluation.  --replete lytes as needed and monitor on telemetry.  --Will follow.
# acute respiratory failure with hypoxemia -- improved. Now on room air  # acute on chronic HFrEF  # MDS, transfusion dependent  # JUAN RAMON on CKD3    - acute onset of dyspnea/hypoxemia likely in setting of acute on chronic HFrEF  - TTE on this admission with reduced EF to 40% and WMA, concern for possible ischemic events leading to worsening heart failure  - cardiology consulted for further ischemic workups: [facility] as outpatient  - pt appears euvolemic on exam: continue PO lasix 40mg daily  - JUAN RAMON now improving; likely component of MINA. Creatinine 2.2 today (baseline 1.8). Will continue to trend  - appreciate heme recs: no plan for inpatient chemoRx  - PT consult given progressive deconditioning: home PT    Rest of plan as above.    updated son at bedside  stable for discharge  46 minutes spent on discharge process    Pratima Mazariegos MD  Division of Hospital Medicine  Contact via Microsoft Teams  Office: [phone]
# acute respiratory failure with hypoxemia  # acute on chronic HFrEF  # MDS, transfusion dependent  # JUAN RAMON on CKD3    - acute onset of dyspnea/hypoxemia likely in setting of acute on chronic HFrEF  - TTE on this admission with reduced EF to 40% and WMA  - concern for possible ischemic events leading to worsening heart failure  - cardiology consulted for further ischemic workups: timing TBD - inpatient vs. outpatient  - diuretics has been hold 2/2 JUAN RAMON; euvolemic on exam - resume lasix 40mg daily in AM  - JUAN RAMON now improving; likely component of MINA  - appreciate heme recs: no plan for inpatient chemoRx  - PT consult given progressive deconditioning  - wean off O2 as tolerated    Pratima Mazariegos MD  Division of Hospital Medicine  Contact via [employer] Teams  Office: [phone]
# acute respiratory failure with hypoxemia -- improved. Now on room air  # acute on chronic HFrEF  # MDS, transfusion dependent  # JUAN RAMON on CKD3    - acute onset of dyspnea/hypoxemia likely in setting of acute on chronic HFrEF  - TTE on this admission with reduced EF to 40% and WMA, concern for possible ischemic events leading to worsening heart failure  - cardiology consulted for further ischemic workups: [facility] as outpatient  - pt appears euvolemic on exam: continue PO lasix 40mg daily  - JUAN RAMON now improving; likely component of MINA. Creatinine 2.5 today (baseline 1.8). Will continue to trend  - appreciate heme recs: no plan for inpatient chemoRx  - PT consult given progressive deconditioning: home PT    Rest of plan as above.    dc planning once [facility] setup    Pratima Mazariegos MD  Division of Hospital Medicine  Contact via Microsoft Teams  Office: [phone]
# acute respiratory failure with hypoxemia  # acute on chronic HFrEF  # MDS, transfusion dependent  # JUAN RAMON on CKD3    - acute onset of dyspnea/hypoxemia likely in setting of acute on chronic HFrEF  - TTE on this admission with reduced EF to 40% and WMA  - concern for possible ischemic events leading to worsening heart failure  - cardiology consulted for further ischemic workups  - continue IV lasix 80mg BID for now and monitor volume status closely  - given neutropenia, start cefepime for ppx coverage  - monitor SCr closely, likely cardiorenal at this time  - appreciate heme recs: no plan for inpatient chemoRx  - PT consult given progressive deconditioning    will likely need LHC given TTE findings  d/w pt in detail
# acute respiratory failure with hypoxemia  # acute on chronic HFrEF  # MDS, transfusion dependent  # JUAN RAMON on CKD3    - acute onset of dyspnea/hypoxemia likely in setting of acute on chronic HFrEF  - TTE on this admission with reduced EF to 40% and WMA  - concern for possible ischemic events leading to worsening heart failure  - cardiology consulted for further ischemic workups  - continue IV lasix 80mg BID for now and monitor volume status closely  - given neutropenia, start cefepime for ppx coverage  - monitor SCr closely, likely cardiorenal at this time  - appreciate heme recs: no plan for inpatient chemoRx  - PT consult given progressive deconditioning    updated daughter at bedside      Pratima Mazariegos MD  Division of Hospital Medicine  Contact via [employer] Teams  Office: [phone]
# acute respiratory failure with hypoxemia -- improved. Now on room air  # acute on chronic HFrEF  # MDS, transfusion dependent  # JUAN RAMON on CKD3    - acute onset of dyspnea/hypoxemia likely in setting of acute on chronic HFrEF  - TTE on this admission with reduced EF to 40% and WMA  - concern for possible ischemic events leading to worsening heart failure  - cardiology consulted for further ischemic workups: timing TBD - inpatient vs. outpatient  - diuretics has been hold 2/2 JUAN RAMON; euvolemic on exam; however CXR shows bibasilar atelectasis v layering fluid  - agree with giving the patient lasix 40mg IV x1 today given the CXR findings  - tomorrow, would resume home dose of PO lasix 40mg daily   - JUAN RAMON now improving; likely component of MINA  - appreciate heme recs: no plan for inpatient chemoRx  - PT consult given progressive deconditioning    Rest of plan as above.
# acute respiratory failure with hypoxemia  # acute on chronic HFrEF  # MDS, transfusion dependent  # JUAN RAMON on CKD3    - acute onset of dyspnea/hypoxemia likely in setting of acute on chronic HFrEF  - TTE on this admission with reduced EF to 40% and WMA  - concern for possible ischemic events leading to worsening heart failure  - cardiology consulted for further ischemic workups  - pt now developing ATN, likely contrast induced  - appears near-euvolemic today, will hold further diuretics for now  - given neutropenia, on cefepime for ppx coverage  - monitor SCr closely, likely cardiorenal at this time  - obtain renal ultrasound  - appreciate heme recs: no plan for inpatient chemoRx  - PT consult given progressive deconditioning    will likely need LHC given TTE findings; however, now developing ATN. Will need to follow up with cardiology and nephrology in regards to best optimization prior to cath.    Rest of plan as per resident note above.
# acute respiratory failure with hypoxemia -- improved. Now on room air  # acute on chronic HFrEF  # MDS, transfusion dependent  # JUAN RAMON on CKD3    - acute onset of dyspnea/hypoxemia likely in setting of acute on chronic HFrEF  - TTE on this admission with reduced EF to 40% and WMA  - concern for possible ischemic events leading to worsening heart failure  - cardiology consulted for further ischemic workups: timing TBD - inpatient vs. outpatient  - diuretics has been hold 2/2 JUAN RAMON; euvolemic on exam; however CXR shows bibasilar atelectasis v layering fluid  - transition from IV lasix to PO lasix today, 40mg daily   - JUAN RAMON now improving; likely component of MINA. Creatinine 2.67 today. Will continue to trend  - appreciate heme recs: no plan for inpatient chemoRx  - PT consult given progressive deconditioning    Rest of plan as above.

## 2023-11-28 NOTE — DISCHARGE NOTE NURSING/CASE MANAGEMENT/SOCIAL WORK - NSDCPEFALRISK_GEN_ALL_CORE
For information on Fall & Injury Prevention, visit: [email or web address] OR  [email or web address] OR  https://www.cdc.gov/steadi/patient.html

## 2023-11-28 NOTE — PROGRESS NOTE ADULT - SUBJECTIVE AND OBJECTIVE BOX
NEPHROLOGY     Patient seen and examined no new complaints, in no acute distress.     MEDICATIONS  (STANDING):  acyclovir   Oral Tab/Cap 400 milliGRAM(s) Oral two times a day  allopurinol 100 milliGRAM(s) Oral daily  aspirin enteric coated 81 milliGRAM(s) Oral daily  atorvastatin 80 milliGRAM(s) Oral at bedtime  chlorhexidine 2% Cloths 1 Application(s) Topical daily  clopidogrel Tablet 75 milliGRAM(s) Oral daily  fluconAZOLE   Tablet 200 milliGRAM(s) Oral daily  furosemide    Tablet 40 milliGRAM(s) Oral daily  gabapentin 300 milliGRAM(s) Oral three times a day  heparin   Injectable 5000 Unit(s) SubCutaneous every 8 hours  metoprolol succinate  milliGRAM(s) Oral daily  pantoprazole    Tablet 40 milliGRAM(s) Oral before breakfast  sertraline 25 milliGRAM(s) Oral daily    VITALS:  T(C): , Max: 36.8 (11-27-23 @ 20:22)  T(F): , Max: 98.3 (11-27-23 @ 20:22)  HR: 63 (11-28-23 @ 12:21)  BP: 111/66 (11-28-23 @ 12:21)  RR: 18 (11-28-23 @ 12:21)  SpO2: 96% (11-28-23 @ 12:21)    I and O's:    11-27 @ 07:01  -  11-28 @ 07:00  --------------------------------------------------------  IN: 720 mL / OUT: 400 mL / NET: 320 mL    11-28 @ 07:01  -  11-28 @ 12:54  --------------------------------------------------------  IN: 0 mL / OUT: 200 mL / NET: -200 mL    PHYSICAL EXAM:  Constitutional: NAD at  HEENT: NCAT, DMM  Neck: Supple, No JVD  Respiratory: (+)bibasilar rales  Cardiovascular: reg s1s2, (+)1/6 LESA  Gastrointestinal: BS+, soft, NT/ND  Extremities: No peripheral edema b/l  Neurological: reduced generalized strength  Back: no CVAT b/l    Skin: (+)erythema b/l shins    LABS:                        10.3   1.48  )-----------( 242      ( 28 Nov 2023 06:29 )             31.0     11-28    140  |  106  |  41<H>  ----------------------------<  104<H>  4.4   |  23  |  2.24<H>    Ca    8.9      28 Nov 2023 06:29  Phos  4.1     11-28  Mg     2.3     11-28    TPro  6.8  /  Alb  3.8  /  TBili  0.5  /  DBili  x   /  AST  20  /  ALT  33  /  AlkPhos  182<H>  11-28    RADIOLOGY & ADDITIONAL STUDIES:  < from: Xray Chest 1 View- PORTABLE-Urgent (Xray Chest 1 View- PORTABLE-Urgent .) (11.27.23 @ 09:25) >  IMPRESSION:  Bibasilar atelectasis and/or small layering effusions. No pulmonary edema.    --- End of Report ---     JOSE SCHMDIT MD; Resident Radiologist  This document has been electronically signed.  LUCINDA CHE MD; Attending Radiologist  This document has been electronically signed. Nov 27 2023  9:53AM    < end of copied text >

## 2023-11-28 NOTE — DISCHARGE NOTE NURSING/CASE MANAGEMENT/SOCIAL WORK - PATIENT PORTAL LINK FT
You can access the FollowMyHealth Patient Portal offered by [facility] by registering at the following website: [email or web address] By joining [email or web address] FollowMyHealth portal, you will also be able to view your health information using other applications (apps) compatible with our system.

## 2023-11-28 NOTE — PROGRESS NOTE ADULT - PROBLEM SELECTOR PLAN 2
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 Cabral (private nephro)  - FeUrea 58.3%, c/w intrinsic kidney disease    Plan:  > improving. Cr 3.76-->3.52-->3.16-->2.67  > transitioned to lasix 40 mg PO qD 11/25  > kidney and bladder US: atrophic right kidney, no hydronephrosis  > monitor urine output  > avoid nephrotoxic meds

## 2023-11-28 NOTE — PROGRESS NOTE ADULT - PROBLEM SELECTOR PLAN 1
Patient presented with incidental, asymptomatic hypoxemia at hematology clinic, down to 88% on room air  - In the ED, patient noted to be persistently hypoxemic to 88% on 5L NC with tachypnea, requiring BIPAP  - CT chest without PE, small bilateral pleural effusions, mild pulmonary edema  - ProBNP elevated to 6700, higher in previous admission  - Had episode of epigastric "lower chest" pain in ED with associated diaphoresis and respiratory distress, may be atypical presentation of cardiac ischemic pain, but since resolved  - TTE 1/2023: moderate AS, normal RV/LV function    Plan:  > CXR 11/24 with bibasilar atelectasis vs layering effusion. Pt appears euvolemic and is now on room air  > TTE 11/21 EF 40%, moderate LV diastolic dysfunction, basal and mid anterolateral wall, basal and mid inferior wall, basal and mid inferolateral wall, and basal anteroseptal segment are abnormal, moderate AS  > cards consulted for ischemic eval for newly-reduced EF with new wall motional abnl and atypical angina. Given JUAN RAMON, plan for outpatient LHC  > continue metoprolol 150 mg ER qD. Continue DAPT  > incentive spirometer given atelectasis on CT

## 2023-11-28 NOTE — DISCHARGE NOTE NURSING/CASE MANAGEMENT/SOCIAL WORK - NSSCNAMETXT_GEN_ALL_CORE
A Nurse From [facility] will Contact You To Schedule A Visit 1 Day Post Discharge

## 2023-11-28 NOTE — PROGRESS NOTE ADULT - REASON FOR ADMISSION
acute hypoxic respiratory failure

## 2023-11-28 NOTE — DISCHARGE NOTE NURSING/CASE MANAGEMENT/SOCIAL WORK - NSDCFUADDAPPT_GEN_ALL_CORE_FT
APPTS ARE READY TO BE MADE: [ ] YES    Best Family or Patient Contact (if needed):    Additional Information about above appointments (if needed):    1: Dr. Gabriel Reddy, within 1 week  2: Dr. Maksim Reddy, within 2 weeks  3:     Other comments or requests:

## 2023-11-28 NOTE — PROGRESS NOTE ADULT - PROBLEM SELECTOR PLAN 3
Patient follows with Dr. Zita Mcmahon at [facility]  - was previously on lenolidamide but held given pancytopenia. Last chemo on 10/30 with decitabine   - patient currently pancytopenic. but appears to be near baseline labs per outpt review  - receiving procrit injections with improvement in pancytopenia    Plan:  > continue with acyclovir for ppx  > holding fluconazole due to prolonged QTC  > hematology on board, appreciate recs  > maintain Hgb>8 given history of CAD. s/p 1 unit of pRBC

## 2023-11-28 NOTE — PROGRESS NOTE ADULT - ASSESSMENT
IMPRESSION: 83M w/ HTN, gout, MDS, USHA, and CKD, 11/20/23 p/w hypoxia    (1)CKD - nonproteinuric CKD4 - largely due to renal artery stenosis; nephrolithiasis, hypertensive nephrosclerosis, and renal atheroembolic disease also may be playing roles here     (2)JUAN RAMON -ATN from contrast nephropathy - improving     (3)Lytes - acceptable for now    (4)Pancytopenia - MDS    (5)Hypoxia - cardiogenic pulmonary edema, it appears. Improved, with diuresis. Potentially for [facility] as outpatient     RECOMMEND:  (1)Lasix as ordered  (2)Meds for GFR 20-30ml/min  (3)If for discharge, can f/u in office with Dr. Cabral in 1-4 weeks    Carlie Wilson DNP  [facility]  [phone]

## 2023-11-28 NOTE — PROGRESS NOTE ADULT - SUBJECTIVE AND OBJECTIVE BOX
PROGRESS NOTE:   Authored by Dr. Michelle Bravo MD (PGY-3). Pager [facility] [phone]/ [id number]    Patient is a 83y old  Male who presents with a chief complaint of acute hypoxic respiratory failure (27 Nov 2023 14:08)      SUBJECTIVE / OVERNIGHT EVENTS:  No acute events overnight.     MEDICATIONS  (STANDING):  acyclovir   Oral Tab/Cap 200 milliGRAM(s) Oral every 12 hours  allopurinol 50 milliGRAM(s) Oral <User Schedule>  aspirin enteric coated 81 milliGRAM(s) Oral daily  atorvastatin 80 milliGRAM(s) Oral at bedtime  chlorhexidine 2% Cloths 1 Application(s) Topical daily  clopidogrel Tablet 75 milliGRAM(s) Oral daily  furosemide    Tablet 40 milliGRAM(s) Oral daily  gabapentin 300 milliGRAM(s) Oral two times a day  heparin   Injectable 5000 Unit(s) SubCutaneous every 8 hours  metoprolol succinate  milliGRAM(s) Oral daily  pantoprazole    Tablet 40 milliGRAM(s) Oral before breakfast  sertraline 25 milliGRAM(s) Oral daily    MEDICATIONS  (PRN):  aluminum hydroxide/magnesium hydroxide/simethicone Suspension 30 milliLiter(s) Oral every 6 hours PRN Dyspepsia  cyclobenzaprine 5 milliGRAM(s) Oral three times a day PRN Muscle Spasm      CAPILLARY BLOOD GLUCOSE        I&O's Summary    26 Nov 2023 07:01  -  27 Nov 2023 07:00  --------------------------------------------------------  IN: 650 mL / OUT: 1475 mL / NET: -825 mL    27 Nov 2023 07:01  -  28 Nov 2023 06:18  --------------------------------------------------------  IN: 720 mL / OUT: 400 mL / NET: 320 mL        PHYSICAL EXAM:  Vital Signs Last 24 Hrs  T(C): 36.7 (28 Nov 2023 04:21), Max: 37.2 (27 Nov 2023 11:33)  T(F): 98 (28 Nov 2023 04:21), Max: 98.9 (27 Nov 2023 11:33)  HR: 61 (28 Nov 2023 04:21) (58 - 69)  BP: 151/76 (28 Nov 2023 04:21) (119/70 - 151/76)  BP(mean): --  RR: 18 (28 Nov 2023 04:21) (18 - 18)  SpO2: 96% (28 Nov 2023 04:21) (96% - 97%)    Parameters below as of 28 Nov 2023 04:21  Patient On (Oxygen Delivery Method): room air        CONSTITUTIONAL: NAD  HEENT: MMM, EOMI, PERRLA  NECK: supple  RESPIRATORY: Normal respiratory effort; lungs are clear to auscultation bilaterally  CARDIOVASCULAR: Regular rate and rhythm, normal S1 and S2, no murmur/rub/gallop; No lower extremity edema  ABDOMEN: Nontender to palpation, normoactive bowel sounds, no rebound/guarding; No hepatosplenomegaly  MUSCULOSKELETAL: no clubbing or cyanosis of digits; no joint swelling or tenderness to palpation  NEURO: alert and oriented to person, place, and time. Moving all four extremities, sensation grossly intact  PSYCH: alert and oriented to person, place, and time; affect appropriate  SKIN: No rash    LABS:                        9.0    1.48  )-----------( 210      ( 27 Nov 2023 05:10 )             27.3     11-27    141  |  107  |  40<H>  ----------------------------<  94  4.4   |  22  |  2.51<H>    Ca    8.3<L>      27 Nov 2023 05:09  Phos  4.5     11-27  Mg     2.4     11-27    TPro  6.8  /  Alb  3.7  /  TBili  0.4  /  DBili  x   /  AST  49<H>  /  ALT  54<H>  /  AlkPhos  210<H>  11-27          Urinalysis Basic - ( 27 Nov 2023 05:09 )    Color: x / Appearance: x / SG: x / pH: x  Gluc: 94 mg/dL / Ketone: x  / Bili: x / Urobili: x   Blood: x / Protein: x / Nitrite: x   Leuk Esterase: x / RBC: x / WBC x   Sq Epi: x / Non Sq Epi: x / Bacteria: x          Tele Reviewed:    RADIOLOGY & ADDITIONAL TESTS:  Results Reviewed:   Imaging Personally Reviewed:  Electrocardiogram Personally Reviewed:

## 2023-11-29 ENCOUNTER — APPOINTMENT (OUTPATIENT)
Dept: INFUSION THERAPY | Facility: HOSPITAL | Age: 83
End: 2023-11-29

## 2023-11-29 ENCOUNTER — NON-APPOINTMENT (OUTPATIENT)
Age: 83
End: 2023-11-29

## 2023-11-30 ENCOUNTER — APPOINTMENT (OUTPATIENT)
Dept: INFUSION THERAPY | Facility: HOSPITAL | Age: 83
End: 2023-11-30

## 2023-11-30 ENCOUNTER — TRANSCRIPTION ENCOUNTER (OUTPATIENT)
Age: 83
End: 2023-11-30

## 2023-12-01 ENCOUNTER — APPOINTMENT (OUTPATIENT)
Dept: INFUSION THERAPY | Facility: HOSPITAL | Age: 83
End: 2023-12-01

## 2023-12-05 ENCOUNTER — APPOINTMENT (OUTPATIENT)
Dept: INFUSION THERAPY | Facility: HOSPITAL | Age: 83
End: 2023-12-05

## 2023-12-07 ENCOUNTER — APPOINTMENT (OUTPATIENT)
Dept: HEMATOLOGY ONCOLOGY | Facility: CLINIC | Age: 83
End: 2023-12-07
Payer: MEDICARE

## 2023-12-07 ENCOUNTER — NON-APPOINTMENT (OUTPATIENT)
Age: 83
End: 2023-12-07

## 2023-12-07 ENCOUNTER — RESULT REVIEW (OUTPATIENT)
Age: 83
End: 2023-12-07

## 2023-12-07 VITALS
WEIGHT: 152.69 LBS | DIASTOLIC BLOOD PRESSURE: 74 MMHG | BODY MASS INDEX: 24.54 KG/M2 | HEIGHT: 66 IN | OXYGEN SATURATION: 99 % | SYSTOLIC BLOOD PRESSURE: 111 MMHG | TEMPERATURE: 97.3 F | RESPIRATION RATE: 16 BRPM | HEART RATE: 53 BPM

## 2023-12-07 LAB
BASOPHILS # BLD AUTO: 0.04 K/UL — SIGNIFICANT CHANGE UP (ref 0–0.2)
BASOPHILS # BLD AUTO: 0.04 K/UL — SIGNIFICANT CHANGE UP (ref 0–0.2)
BASOPHILS NFR BLD AUTO: 1.2 % — SIGNIFICANT CHANGE UP (ref 0–2)
BASOPHILS NFR BLD AUTO: 1.2 % — SIGNIFICANT CHANGE UP (ref 0–2)
EOSINOPHIL # BLD AUTO: 0.08 K/UL — SIGNIFICANT CHANGE UP (ref 0–0.5)
EOSINOPHIL # BLD AUTO: 0.08 K/UL — SIGNIFICANT CHANGE UP (ref 0–0.5)
EOSINOPHIL NFR BLD AUTO: 2.3 % — SIGNIFICANT CHANGE UP (ref 0–6)
EOSINOPHIL NFR BLD AUTO: 2.3 % — SIGNIFICANT CHANGE UP (ref 0–6)
HCT VFR BLD CALC: 30.2 % — LOW (ref 39–50)
HCT VFR BLD CALC: 30.2 % — LOW (ref 39–50)
HGB BLD-MCNC: 10.4 G/DL — LOW (ref 13–17)
HGB BLD-MCNC: 10.4 G/DL — LOW (ref 13–17)
IMM GRANULOCYTES NFR BLD AUTO: 3.5 % — HIGH (ref 0–0.9)
IMM GRANULOCYTES NFR BLD AUTO: 3.5 % — HIGH (ref 0–0.9)
LYMPHOCYTES # BLD AUTO: 1.28 K/UL — SIGNIFICANT CHANGE UP (ref 1–3.3)
LYMPHOCYTES # BLD AUTO: 1.28 K/UL — SIGNIFICANT CHANGE UP (ref 1–3.3)
LYMPHOCYTES # BLD AUTO: 36.9 % — SIGNIFICANT CHANGE UP (ref 13–44)
LYMPHOCYTES # BLD AUTO: 36.9 % — SIGNIFICANT CHANGE UP (ref 13–44)
MCHC RBC-ENTMCNC: 34.4 G/DL — SIGNIFICANT CHANGE UP (ref 32–36)
MCHC RBC-ENTMCNC: 34.4 G/DL — SIGNIFICANT CHANGE UP (ref 32–36)
MCHC RBC-ENTMCNC: 38.2 PG — HIGH (ref 27–34)
MCHC RBC-ENTMCNC: 38.2 PG — HIGH (ref 27–34)
MCV RBC AUTO: 111 FL — HIGH (ref 80–100)
MCV RBC AUTO: 111 FL — HIGH (ref 80–100)
MONOCYTES # BLD AUTO: 0.37 K/UL — SIGNIFICANT CHANGE UP (ref 0–0.9)
MONOCYTES # BLD AUTO: 0.37 K/UL — SIGNIFICANT CHANGE UP (ref 0–0.9)
MONOCYTES NFR BLD AUTO: 10.7 % — SIGNIFICANT CHANGE UP (ref 2–14)
MONOCYTES NFR BLD AUTO: 10.7 % — SIGNIFICANT CHANGE UP (ref 2–14)
NEUTROPHILS # BLD AUTO: 1.58 K/UL — LOW (ref 1.8–7.4)
NEUTROPHILS # BLD AUTO: 1.58 K/UL — LOW (ref 1.8–7.4)
NEUTROPHILS NFR BLD AUTO: 45.4 % — SIGNIFICANT CHANGE UP (ref 43–77)
NEUTROPHILS NFR BLD AUTO: 45.4 % — SIGNIFICANT CHANGE UP (ref 43–77)
NRBC # BLD: 0 /100 WBCS — SIGNIFICANT CHANGE UP (ref 0–0)
NRBC # BLD: 0 /100 WBCS — SIGNIFICANT CHANGE UP (ref 0–0)
PLATELET # BLD AUTO: 175 K/UL — SIGNIFICANT CHANGE UP (ref 150–400)
PLATELET # BLD AUTO: 175 K/UL — SIGNIFICANT CHANGE UP (ref 150–400)
RBC # BLD: 2.72 M/UL — LOW (ref 4.2–5.8)
RBC # BLD: 2.72 M/UL — LOW (ref 4.2–5.8)
RBC # FLD: 23.2 % — HIGH (ref 10.3–14.5)
RBC # FLD: 23.2 % — HIGH (ref 10.3–14.5)
WBC # BLD: 3.47 K/UL — LOW (ref 3.8–10.5)
WBC # BLD: 3.47 K/UL — LOW (ref 3.8–10.5)
WBC # FLD AUTO: 3.47 K/UL — LOW (ref 3.8–10.5)
WBC # FLD AUTO: 3.47 K/UL — LOW (ref 3.8–10.5)

## 2023-12-07 PROCEDURE — 99214 OFFICE O/P EST MOD 30 MIN: CPT

## 2023-12-11 ENCOUNTER — RESULT REVIEW (OUTPATIENT)
Age: 83
End: 2023-12-11

## 2023-12-11 ENCOUNTER — LABORATORY RESULT (OUTPATIENT)
Age: 83
End: 2023-12-11

## 2023-12-11 ENCOUNTER — APPOINTMENT (OUTPATIENT)
Dept: INTERNAL MEDICINE | Facility: CLINIC | Age: 83
End: 2023-12-11
Payer: MEDICARE

## 2023-12-11 ENCOUNTER — APPOINTMENT (OUTPATIENT)
Dept: INFUSION THERAPY | Facility: HOSPITAL | Age: 83
End: 2023-12-11

## 2023-12-11 VITALS
OXYGEN SATURATION: 99 % | DIASTOLIC BLOOD PRESSURE: 72 MMHG | TEMPERATURE: 97.5 F | WEIGHT: 152 LBS | SYSTOLIC BLOOD PRESSURE: 126 MMHG | BODY MASS INDEX: 24.43 KG/M2 | HEIGHT: 66 IN | HEART RATE: 55 BPM

## 2023-12-11 DIAGNOSIS — N41.9 INFLAMMATORY DISEASE OF PROSTATE, UNSPECIFIED: ICD-10-CM

## 2023-12-11 DIAGNOSIS — I65.23 OCCLUSION AND STENOSIS OF BILATERAL CAROTID ARTERIES: ICD-10-CM

## 2023-12-11 DIAGNOSIS — E78.5 HYPERLIPIDEMIA, UNSPECIFIED: ICD-10-CM

## 2023-12-11 DIAGNOSIS — L03.119 CELLULITIS OF UNSPECIFIED PART OF LIMB: ICD-10-CM

## 2023-12-11 DIAGNOSIS — N39.0 URINARY TRACT INFECTION, SITE NOT SPECIFIED: ICD-10-CM

## 2023-12-11 LAB
ALBUMIN SERPL ELPH-MCNC: 4.4 G/DL — SIGNIFICANT CHANGE UP (ref 3.3–5)
ALBUMIN SERPL ELPH-MCNC: 4.4 G/DL — SIGNIFICANT CHANGE UP (ref 3.3–5)
ALP SERPL-CCNC: 128 U/L — HIGH (ref 40–120)
ALP SERPL-CCNC: 128 U/L — HIGH (ref 40–120)
ALT FLD-CCNC: <5 U/L — LOW (ref 10–45)
ALT FLD-CCNC: <5 U/L — LOW (ref 10–45)
ANION GAP SERPL CALC-SCNC: 15 MMOL/L — SIGNIFICANT CHANGE UP (ref 5–17)
ANION GAP SERPL CALC-SCNC: 15 MMOL/L — SIGNIFICANT CHANGE UP (ref 5–17)
AST SERPL-CCNC: 23 U/L — SIGNIFICANT CHANGE UP (ref 10–40)
AST SERPL-CCNC: 23 U/L — SIGNIFICANT CHANGE UP (ref 10–40)
BASOPHILS # BLD AUTO: 0.04 K/UL — SIGNIFICANT CHANGE UP (ref 0–0.2)
BASOPHILS # BLD AUTO: 0.04 K/UL — SIGNIFICANT CHANGE UP (ref 0–0.2)
BASOPHILS NFR BLD AUTO: 0.8 % — SIGNIFICANT CHANGE UP (ref 0–2)
BASOPHILS NFR BLD AUTO: 0.8 % — SIGNIFICANT CHANGE UP (ref 0–2)
BILIRUB SERPL-MCNC: 0.5 MG/DL — SIGNIFICANT CHANGE UP (ref 0.2–1.2)
BILIRUB SERPL-MCNC: 0.5 MG/DL — SIGNIFICANT CHANGE UP (ref 0.2–1.2)
BUN SERPL-MCNC: 30 MG/DL — HIGH (ref 7–23)
BUN SERPL-MCNC: 30 MG/DL — HIGH (ref 7–23)
CALCIUM SERPL-MCNC: 9.4 MG/DL — SIGNIFICANT CHANGE UP (ref 8.4–10.5)
CALCIUM SERPL-MCNC: 9.4 MG/DL — SIGNIFICANT CHANGE UP (ref 8.4–10.5)
CHLORIDE SERPL-SCNC: 103 MMOL/L — SIGNIFICANT CHANGE UP (ref 96–108)
CHLORIDE SERPL-SCNC: 103 MMOL/L — SIGNIFICANT CHANGE UP (ref 96–108)
CO2 SERPL-SCNC: 22 MMOL/L — SIGNIFICANT CHANGE UP (ref 22–31)
CO2 SERPL-SCNC: 22 MMOL/L — SIGNIFICANT CHANGE UP (ref 22–31)
CREAT SERPL-MCNC: 1.86 MG/DL — HIGH (ref 0.5–1.3)
CREAT SERPL-MCNC: 1.86 MG/DL — HIGH (ref 0.5–1.3)
EGFR: 36 ML/MIN/1.73M2 — LOW
EGFR: 36 ML/MIN/1.73M2 — LOW
EOSINOPHIL # BLD AUTO: 0.09 K/UL — SIGNIFICANT CHANGE UP (ref 0–0.5)
EOSINOPHIL # BLD AUTO: 0.09 K/UL — SIGNIFICANT CHANGE UP (ref 0–0.5)
EOSINOPHIL NFR BLD AUTO: 1.8 % — SIGNIFICANT CHANGE UP (ref 0–6)
EOSINOPHIL NFR BLD AUTO: 1.8 % — SIGNIFICANT CHANGE UP (ref 0–6)
GLUCOSE SERPL-MCNC: 102 MG/DL — HIGH (ref 70–99)
GLUCOSE SERPL-MCNC: 102 MG/DL — HIGH (ref 70–99)
HCT VFR BLD CALC: 30.6 % — LOW (ref 39–50)
HCT VFR BLD CALC: 30.6 % — LOW (ref 39–50)
HGB BLD-MCNC: 10.5 G/DL — LOW (ref 13–17)
HGB BLD-MCNC: 10.5 G/DL — LOW (ref 13–17)
IMM GRANULOCYTES NFR BLD AUTO: 3.6 % — HIGH (ref 0–0.9)
IMM GRANULOCYTES NFR BLD AUTO: 3.6 % — HIGH (ref 0–0.9)
LDH SERPL L TO P-CCNC: 264 U/L — HIGH (ref 50–242)
LDH SERPL L TO P-CCNC: 264 U/L — HIGH (ref 50–242)
LYMPHOCYTES # BLD AUTO: 1.44 K/UL — SIGNIFICANT CHANGE UP (ref 1–3.3)
LYMPHOCYTES # BLD AUTO: 1.44 K/UL — SIGNIFICANT CHANGE UP (ref 1–3.3)
LYMPHOCYTES # BLD AUTO: 28.5 % — SIGNIFICANT CHANGE UP (ref 13–44)
LYMPHOCYTES # BLD AUTO: 28.5 % — SIGNIFICANT CHANGE UP (ref 13–44)
MCHC RBC-ENTMCNC: 34.3 G/DL — SIGNIFICANT CHANGE UP (ref 32–36)
MCHC RBC-ENTMCNC: 34.3 G/DL — SIGNIFICANT CHANGE UP (ref 32–36)
MCHC RBC-ENTMCNC: 38.5 PG — HIGH (ref 27–34)
MCHC RBC-ENTMCNC: 38.5 PG — HIGH (ref 27–34)
MCV RBC AUTO: 112.1 FL — HIGH (ref 80–100)
MCV RBC AUTO: 112.1 FL — HIGH (ref 80–100)
MONOCYTES # BLD AUTO: 0.42 K/UL — SIGNIFICANT CHANGE UP (ref 0–0.9)
MONOCYTES # BLD AUTO: 0.42 K/UL — SIGNIFICANT CHANGE UP (ref 0–0.9)
MONOCYTES NFR BLD AUTO: 8.3 % — SIGNIFICANT CHANGE UP (ref 2–14)
MONOCYTES NFR BLD AUTO: 8.3 % — SIGNIFICANT CHANGE UP (ref 2–14)
NEUTROPHILS # BLD AUTO: 2.88 K/UL — SIGNIFICANT CHANGE UP (ref 1.8–7.4)
NEUTROPHILS # BLD AUTO: 2.88 K/UL — SIGNIFICANT CHANGE UP (ref 1.8–7.4)
NEUTROPHILS NFR BLD AUTO: 57 % — SIGNIFICANT CHANGE UP (ref 43–77)
NEUTROPHILS NFR BLD AUTO: 57 % — SIGNIFICANT CHANGE UP (ref 43–77)
NRBC # BLD: 0 /100 WBCS — SIGNIFICANT CHANGE UP (ref 0–0)
NRBC # BLD: 0 /100 WBCS — SIGNIFICANT CHANGE UP (ref 0–0)
PLATELET # BLD AUTO: 150 K/UL — SIGNIFICANT CHANGE UP (ref 150–400)
PLATELET # BLD AUTO: 150 K/UL — SIGNIFICANT CHANGE UP (ref 150–400)
POTASSIUM SERPL-MCNC: 5 MMOL/L — SIGNIFICANT CHANGE UP (ref 3.5–5.3)
POTASSIUM SERPL-MCNC: 5 MMOL/L — SIGNIFICANT CHANGE UP (ref 3.5–5.3)
POTASSIUM SERPL-SCNC: 5 MMOL/L — SIGNIFICANT CHANGE UP (ref 3.5–5.3)
POTASSIUM SERPL-SCNC: 5 MMOL/L — SIGNIFICANT CHANGE UP (ref 3.5–5.3)
PROT SERPL-MCNC: 7.6 G/DL — SIGNIFICANT CHANGE UP (ref 6–8.3)
PROT SERPL-MCNC: 7.6 G/DL — SIGNIFICANT CHANGE UP (ref 6–8.3)
RBC # BLD: 2.73 M/UL — LOW (ref 4.2–5.8)
RBC # BLD: 2.73 M/UL — LOW (ref 4.2–5.8)
RBC # FLD: 23.3 % — HIGH (ref 10.3–14.5)
RBC # FLD: 23.3 % — HIGH (ref 10.3–14.5)
SODIUM SERPL-SCNC: 139 MMOL/L — SIGNIFICANT CHANGE UP (ref 135–145)
SODIUM SERPL-SCNC: 139 MMOL/L — SIGNIFICANT CHANGE UP (ref 135–145)
WBC # BLD: 5.05 K/UL — SIGNIFICANT CHANGE UP (ref 3.8–10.5)
WBC # BLD: 5.05 K/UL — SIGNIFICANT CHANGE UP (ref 3.8–10.5)
WBC # FLD AUTO: 5.05 K/UL — SIGNIFICANT CHANGE UP (ref 3.8–10.5)
WBC # FLD AUTO: 5.05 K/UL — SIGNIFICANT CHANGE UP (ref 3.8–10.5)

## 2023-12-11 PROCEDURE — 99495 TRANSJ CARE MGMT MOD F2F 14D: CPT | Mod: 25

## 2023-12-11 PROCEDURE — 36415 COLL VENOUS BLD VENIPUNCTURE: CPT

## 2023-12-11 NOTE — PROGRESS NOTE ADULT - PROBLEM SELECTOR PROBLEM 7
Airway    Performed by: Gagan Garcia DO  Authorized by: Gagan Garcia DO    Final Airway Type:  Endotracheal airway  Final Endotracheal Airway*:  ETT  ETT Size (mm)*:  7.5  Cuff*:  Regular  Technique Used for Successful ETT Placement:  Direct laryngoscopy  Devices/Methods Used in Placement*:  Mask  Intubation Procedure*:  Preoxygenation, ETCO2, Atraumatic, Dentition Unchanged and Pharynx Clear  Insertion Site:  Oral  Blade Type*:  MAC  Blade Size*:  3  Cuff Volume (mL):  8  Secured at (cm)*:  22  Placement Verified by: auscultation and capnometry    Glottic View*:  1 - full view of glottis  Attempts*:  1  Number of Other Approaches Attempted:  0   Patient Identified, Procedure confirmed, Emergency equipment available and Safety protocols followed  Location:  OR  Urgency:  Elective  Difficult Airway: No    Indications for Airway Management:  Anesthesia  Spontaneous Ventilation: absent    Sedation Level:  Anesthetized  MILS Maintained Throughout: No    Mask Difficulty Assessment:  1 - vent by mask  Start Time: 12/11/2023 4:23 PM       Acute kidney injury superimposed on CKD

## 2023-12-12 ENCOUNTER — APPOINTMENT (OUTPATIENT)
Dept: INFUSION THERAPY | Facility: HOSPITAL | Age: 83
End: 2023-12-12

## 2023-12-12 DIAGNOSIS — R11.2 NAUSEA WITH VOMITING, UNSPECIFIED: ICD-10-CM

## 2023-12-12 DIAGNOSIS — Z51.11 ENCOUNTER FOR ANTINEOPLASTIC CHEMOTHERAPY: ICD-10-CM

## 2023-12-13 ENCOUNTER — APPOINTMENT (OUTPATIENT)
Dept: CARDIOLOGY | Facility: CLINIC | Age: 83
End: 2023-12-13
Payer: MEDICARE

## 2023-12-13 ENCOUNTER — APPOINTMENT (OUTPATIENT)
Dept: INFUSION THERAPY | Facility: HOSPITAL | Age: 83
End: 2023-12-13

## 2023-12-13 VITALS
DIASTOLIC BLOOD PRESSURE: 65 MMHG | BODY MASS INDEX: 24.43 KG/M2 | HEART RATE: 50 BPM | WEIGHT: 152 LBS | OXYGEN SATURATION: 100 % | SYSTOLIC BLOOD PRESSURE: 119 MMHG | HEIGHT: 66 IN

## 2023-12-13 DIAGNOSIS — Z00.00 ENCOUNTER FOR GENERAL ADULT MEDICAL EXAMINATION W/OUT ABNORMAL FINDINGS: ICD-10-CM

## 2023-12-13 PROCEDURE — 99215 OFFICE O/P EST HI 40 MIN: CPT

## 2023-12-13 NOTE — ASSESSMENT
[FreeTextEntry1] : Assessment: 1. Renal artery stenosis -s/p L RA stent 2. HTN 3. CKD stage 3-4 4. PAD 5. CAD 6. History of Afib 7. MDS 8. Lower extremity wounds. 9. Recent admission for HFrEF  Plan: 1. LE arterial duplex to assess PAD. 2. Repeat TTE. 3. Repeat CXR to re-evaluation of chest congestion. 4. Continue antiplatelet therapy and statin. 5. Continue excellent care with Heme. 6. Return in 6 weeks - will dicsuss role for [facility] at that time

## 2023-12-13 NOTE — REASON FOR VISIT
[Follow-Up - Clinic] : a clinic follow-up of [FreeTextEntry2] : PAD [FreeTextEntry1] : 12/13  Patient currently without C/P or SOB. No LE edema. R hallux and 3rd digit wounds. L hallux wound.   Cr. improved to 1.8. Platelet count currently 150. Last Hgb above 10.  Hospital Course 11/20-11/27  Patient admitted for acute hypoxic respiratory failure due to decompensated heart failure. Labs remarkable for pancytopenia (QBC 1.63, Hgb 8.1, Plt 100) and proBNP 6700. TTE showed newly reduced EF of 40%, moderate LV diastolic dysfunction, moderate AS, normal RV size and function, basal and mid anterolateral wall, basal and mid inferior wall, basal and mid inferolateral wall, and basal anteroseptal segment are abnormal. CTA C/A/P showed small bilateral pleural effusions with mild interlobular septal thickening which may reflect mild pulmonary interstitial edema and compressive bilateral lower lobe atelectasis  Patient initially required BIPAP for increased work of breathing and tachypnea. He was started on IV diuretics and was eventually weaned to room air. Cardiology consulted for ischemic evaluation due to newly reduced EF seen on echocardiogram. Patient developed JUAN RAMON on CKD so LHC was deferred. JUAN RAMON thought to be secondary to contrast induced nephropathy. Kidney and bladder US showed atrophic right kidney, no hydronephrosis. On discharge, serum creatinine was 2.24.  Patient had intermittent episodes of atrial tachycardia while hospitalized. Home metoprolol succinate was increased to 150 mg XL.  Heme was consulted for pancytopenia and recommended no inpatient chemo. Patient received 1 unit of pRBCs to maintain Hgb>8. He showed no signs or symptoms of bleeding.  PT evaluated patient and recommended NERI. Patient declined NERI and is being discharged home with home PT.   Medication Changes: -Increased metoprolol succinate to 150 mg XL daily. -Stopped cilostazol due to black box warning for heart failure.   Medications:  acyclovir 400 mg oral tablet: 1 tab(s) orally 2 times a day allopurinol 100 mg oral tablet: 1 tab(s) orally once a day amLODIPine 5 mg oral tablet: 1 tab(s) orally once a day aspirin 81 mg oral capsule: 1 cap(s) orally once a day cholecalciferol oral tablet: 2000 unit(s) orally once a day cyclobenzaprine 5 mg oral tablet: 1 tab(s) orally 3 times a day as needed for back pain fluconazole 200 mg oral tablet: 1 tab(s) orally once a day furosemide 40 mg oral tablet: 1 tab(s) orally once a day gabapentin 100 mg oral capsule: 3 cap(s) orally 3 times a day metoprolol succinate 50 mg oral tablet, extended release: 3 tab(s) orally once a day omeprazole 20 mg oral delayed release tablet: 1 tab(s) orally once a day Plavix 75 mg oral tablet: 1 tab(s) orally once a day rosuvastatin 10 mg oral tablet: 1 tab(s) orally once a day sertraline 25 mg oral tablet: 1 tab(s) orally once a day (at bedtime)   11/15  Patient with wounds to the toes. Recommend ER admission (patient refusing). Recommend LE arterial duplex and CHER this week. Podiatry evaluation this week.  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 Since last visit he was hospitalized for new onset Afib and anemia, course complicated acute gout flare, neutropenic fever, transaminitis, and JUAN RAMON on CKD.  Since his discharge from the hospital, he has had two vagal episodes with Afib RVR which is now being controlled by Metoprolol.  On Procrit for anemia.  Currently denies C/P or SOB, at rest or on ambulation.  Denies recent LOC. Reports history of LE edema which is improved with elevation. No lower extremity wounds. Uses a walker for ambulation.  Zio patch 2/17 showed: 8 runs SVT, longest 16 beats, frequent PVCs 5.5%, ventricular bigeminy and trigeminy.  LE venous duplex in 2/2023 showed no DVT.  TTE in 1/2023 showed: 1. Mitral annular calcification, otherwise normal mitral valve. Mild-moderate mitral regurgitation. 2. Calcified trileaflet aortic valve with decreased opening. Peak transaortic valve gradient equals 29 mm Hg, mean transaortic valve gradient equals 20 mm Hg, estimated aortic valve area equals 1.4 sqcm (by continuity equation), aortic valve velocity time integral equals 59 cm, consistent with moderate aortic stenosis. Minimal aortic regurgitation.  3. Normal left ventricular internal dimensions and wall thicknesses. 4. Normal left ventricular systolic function. No segmental wall motion abnormalities. 5. Normal right ventricular size and function.  Last Cr. 1.8 on 10/2 (improved from prior) Last Hgb 10.1  Reports portacath being placed tomorrow for treatment of MDS.  Medications: Patient unaware to verify medications   2/27/23  Daughter called and noted patient with a second syncopal episode while on the toilet. No head injury. Currently VSS. Currently asymptomatic. Recommend ER admission.   2/17/2023 He was having a blood transfusion with hematology end of jan Was noted to have a fast heart rate Was admitted to [facility], for 1 week Had 3-4 blood transfusions there. ? afib  Meds: Allopurinol calcitriol coreg 6.25mg BID Cilostazol 100mg BID Colchicine 0.6 Folic acid Protonix senna   Aspirin and plavix stopped norvasc stopped  crestor stopped   4/6/2022 Recent admission - vertigo  Labs with Dr. Reddy - Creatinine is  2.18, Sodium 140 Hemoglobin was 8 *** (was anemic in hospital as well) BNP was 772 Echo Dec 2021:  mild AS.  normal LV function .  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 Has a 24 hour aid name is [employer]. Balance is ok, walks with a walker No falls Eating ok, no more vomiting  No wounds on the feet or toes.  No blood in the stool or the urine. He does not take iron he remains on aspirin and plavix.   10/6/2021  He is going for accupuncture it is helping somewhat Seen by Dr. Medel - he has spinal dz L3-S1 Undergoing accupunture no PT - he declined.  Otherwise he has no chest pain Breathing ok . Diuretic every other day, helps with leg swelling no wounds on the feet or toes.  Mild edema of the legs  Labs :  hemoglobin 11.1 (previously 9.9) Creatinine 2.10 (previously 2.5)     6/30/2021 Complains of calf pain with walking R hip pains Pins and needs bottom of right foot most recent creatinine is 2.39 He remains on cilostazol 50mg BID He remains on plavix BP is well controlled The left leg is not as bad as the right Complains of some ankle pains No open wounds or tissue loss on the feet.  No chest pain  no chest pressure.    3/31/2021  He had R Radial cath  of the RCA otherwise 60% stenosis No chest pain  no angina he has mild to mod claudcation both legs, no rest pain.  not really active.  Will medically managae  Seen by Mohsen Cabral nephrology, all stable.    Medications: Amlodipine 5 mg Aspirin 81 Calcitriol  Coreg 2 5mg BID Plavix 75 mg daily  Colchicine 0.6 mg daily for gout.   Crestor 10 mg daily  Lasix 40mg every other day     BP at home is 160-170 at home.  His BP here is 148/75  He complains of leg pains, both, entire legs, with walking.     Furosemide 40 mg every other day    1/15/2021 Discussed arterial duplex results with patient, diffuse disease noted. Chapis Class 3 claudication. Also reporting bilateral healed / healing sores. Plans for labs and office visit next week. Decision if to proceed with peripheral angiogram on office visit.  Call office if any worsening symptoms occurring.   Here for followup  12/2/2020  Had followed up with nephrology Dr. Cabral 2 weeks ago All good.  Had labwork done, states all is ok Needs tingling of the right leg, bottom of the foot Hard to move it  sometimes Not worse with walking he gets tired with walking.  The right is worse than the left.    Urinating no problems

## 2023-12-13 NOTE — PHYSICAL EXAM
[General Appearance - Well Developed] : well developed [Normal Appearance] : normal appearance [Well Groomed] : well groomed [General Appearance - Well Nourished] : well nourished [No Deformities] : no deformities [General Appearance - In No Acute Distress] : no acute distress [Normal Conjunctiva] : the conjunctiva exhibited no abnormalities [Eyelids - No Xanthelasma] : the eyelids demonstrated no xanthelasmas [Normal Oral Mucosa] : normal oral mucosa [No Oral Pallor] : no oral pallor [No Oral Cyanosis] : no oral cyanosis [Normal Jugular Venous A Waves Present] : normal jugular venous A waves present [Normal Jugular Venous V Waves Present] : normal jugular venous V waves present [No Jugular Venous Stearns A Waves] : no jugular venous stearns A waves [Respiration, Rhythm And Depth] : normal respiratory rhythm and effort [Exaggerated Use Of Accessory Muscles For Inspiration] : no accessory muscle use [Auscultation Breath Sounds / Voice Sounds] : lungs were clear to auscultation bilaterally [Heart Rate And Rhythm] : heart rate and rhythm were normal [Heart Sounds] : normal S1 and S2 [Abdomen Soft] : soft [Abdomen Tenderness] : non-tender [Abdomen Mass (___ Cm)] : no abdominal mass palpated [Cyanosis, Localized] : no localized cyanosis [] : no rash [No Skin Ulcers] : no skin ulcer [Oriented To Time, Place, And Person] : oriented to person, place, and time [Affect] : the affect was normal [Mood] : the mood was normal [No Anxiety] : not feeling anxious [FreeTextEntry1] : No LE edema bilaterally, R hallux and 3rd digit wounds, L hallux wound

## 2023-12-14 ENCOUNTER — APPOINTMENT (OUTPATIENT)
Dept: INFUSION THERAPY | Facility: HOSPITAL | Age: 83
End: 2023-12-14

## 2023-12-15 ENCOUNTER — RESULT REVIEW (OUTPATIENT)
Age: 83
End: 2023-12-15

## 2023-12-15 ENCOUNTER — OUTPATIENT (OUTPATIENT)
Dept: OUTPATIENT SERVICES | Facility: HOSPITAL | Age: 83
LOS: 1 days | End: 2023-12-15

## 2023-12-15 ENCOUNTER — APPOINTMENT (OUTPATIENT)
Dept: INFUSION THERAPY | Facility: HOSPITAL | Age: 83
End: 2023-12-15

## 2023-12-15 DIAGNOSIS — Z98.890 OTHER SPECIFIED POSTPROCEDURAL STATES: Chronic | ICD-10-CM

## 2023-12-15 DIAGNOSIS — Z86.79 PERSONAL HISTORY OF OTHER DISEASES OF THE CIRCULATORY SYSTEM: Chronic | ICD-10-CM

## 2023-12-15 DIAGNOSIS — Z98.49 CATARACT EXTRACTION STATUS, UNSPECIFIED EYE: Chronic | ICD-10-CM

## 2023-12-15 DIAGNOSIS — D64.9 ANEMIA, UNSPECIFIED: ICD-10-CM

## 2023-12-15 LAB
ALBUMIN SERPL ELPH-MCNC: 4.1 G/DL — SIGNIFICANT CHANGE UP (ref 3.3–5)
ALBUMIN SERPL ELPH-MCNC: 4.1 G/DL — SIGNIFICANT CHANGE UP (ref 3.3–5)
ALP SERPL-CCNC: 113 U/L — SIGNIFICANT CHANGE UP (ref 40–120)
ALP SERPL-CCNC: 113 U/L — SIGNIFICANT CHANGE UP (ref 40–120)
ALT FLD-CCNC: <5 U/L — LOW (ref 10–45)
ALT FLD-CCNC: <5 U/L — LOW (ref 10–45)
ANION GAP SERPL CALC-SCNC: 11 MMOL/L — SIGNIFICANT CHANGE UP (ref 5–17)
ANION GAP SERPL CALC-SCNC: 11 MMOL/L — SIGNIFICANT CHANGE UP (ref 5–17)
AST SERPL-CCNC: 18 U/L — SIGNIFICANT CHANGE UP (ref 10–40)
AST SERPL-CCNC: 18 U/L — SIGNIFICANT CHANGE UP (ref 10–40)
BASOPHILS # BLD AUTO: 0.03 K/UL — SIGNIFICANT CHANGE UP (ref 0–0.2)
BASOPHILS # BLD AUTO: 0.03 K/UL — SIGNIFICANT CHANGE UP (ref 0–0.2)
BASOPHILS NFR BLD AUTO: 0.7 % — SIGNIFICANT CHANGE UP (ref 0–2)
BASOPHILS NFR BLD AUTO: 0.7 % — SIGNIFICANT CHANGE UP (ref 0–2)
BILIRUB SERPL-MCNC: 0.4 MG/DL — SIGNIFICANT CHANGE UP (ref 0.2–1.2)
BILIRUB SERPL-MCNC: 0.4 MG/DL — SIGNIFICANT CHANGE UP (ref 0.2–1.2)
BUN SERPL-MCNC: 30 MG/DL — HIGH (ref 7–23)
BUN SERPL-MCNC: 30 MG/DL — HIGH (ref 7–23)
CALCIUM SERPL-MCNC: 8.4 MG/DL — SIGNIFICANT CHANGE UP (ref 8.4–10.5)
CALCIUM SERPL-MCNC: 8.4 MG/DL — SIGNIFICANT CHANGE UP (ref 8.4–10.5)
CHLORIDE SERPL-SCNC: 107 MMOL/L — SIGNIFICANT CHANGE UP (ref 96–108)
CHLORIDE SERPL-SCNC: 107 MMOL/L — SIGNIFICANT CHANGE UP (ref 96–108)
CO2 SERPL-SCNC: 24 MMOL/L — SIGNIFICANT CHANGE UP (ref 22–31)
CO2 SERPL-SCNC: 24 MMOL/L — SIGNIFICANT CHANGE UP (ref 22–31)
CREAT SERPL-MCNC: 1.85 MG/DL — HIGH (ref 0.5–1.3)
CREAT SERPL-MCNC: 1.85 MG/DL — HIGH (ref 0.5–1.3)
EGFR: 36 ML/MIN/1.73M2 — LOW
EGFR: 36 ML/MIN/1.73M2 — LOW
EOSINOPHIL # BLD AUTO: 0.09 K/UL — SIGNIFICANT CHANGE UP (ref 0–0.5)
EOSINOPHIL # BLD AUTO: 0.09 K/UL — SIGNIFICANT CHANGE UP (ref 0–0.5)
EOSINOPHIL NFR BLD AUTO: 2.1 % — SIGNIFICANT CHANGE UP (ref 0–6)
EOSINOPHIL NFR BLD AUTO: 2.1 % — SIGNIFICANT CHANGE UP (ref 0–6)
GLUCOSE SERPL-MCNC: 79 MG/DL — SIGNIFICANT CHANGE UP (ref 70–99)
GLUCOSE SERPL-MCNC: 79 MG/DL — SIGNIFICANT CHANGE UP (ref 70–99)
HCT VFR BLD CALC: 27.8 % — LOW (ref 39–50)
HCT VFR BLD CALC: 27.8 % — LOW (ref 39–50)
HGB BLD-MCNC: 9.3 G/DL — LOW (ref 13–17)
HGB BLD-MCNC: 9.3 G/DL — LOW (ref 13–17)
IMM GRANULOCYTES NFR BLD AUTO: 1.9 % — HIGH (ref 0–0.9)
IMM GRANULOCYTES NFR BLD AUTO: 1.9 % — HIGH (ref 0–0.9)
LYMPHOCYTES # BLD AUTO: 1.12 K/UL — SIGNIFICANT CHANGE UP (ref 1–3.3)
LYMPHOCYTES # BLD AUTO: 1.12 K/UL — SIGNIFICANT CHANGE UP (ref 1–3.3)
LYMPHOCYTES # BLD AUTO: 26 % — SIGNIFICANT CHANGE UP (ref 13–44)
LYMPHOCYTES # BLD AUTO: 26 % — SIGNIFICANT CHANGE UP (ref 13–44)
MCHC RBC-ENTMCNC: 33.5 G/DL — SIGNIFICANT CHANGE UP (ref 32–36)
MCHC RBC-ENTMCNC: 33.5 G/DL — SIGNIFICANT CHANGE UP (ref 32–36)
MCHC RBC-ENTMCNC: 38.4 PG — HIGH (ref 27–34)
MCHC RBC-ENTMCNC: 38.4 PG — HIGH (ref 27–34)
MCV RBC AUTO: 114.9 FL — HIGH (ref 80–100)
MCV RBC AUTO: 114.9 FL — HIGH (ref 80–100)
MONOCYTES # BLD AUTO: 0.24 K/UL — SIGNIFICANT CHANGE UP (ref 0–0.9)
MONOCYTES # BLD AUTO: 0.24 K/UL — SIGNIFICANT CHANGE UP (ref 0–0.9)
MONOCYTES NFR BLD AUTO: 5.6 % — SIGNIFICANT CHANGE UP (ref 2–14)
MONOCYTES NFR BLD AUTO: 5.6 % — SIGNIFICANT CHANGE UP (ref 2–14)
NEUTROPHILS # BLD AUTO: 2.74 K/UL — SIGNIFICANT CHANGE UP (ref 1.8–7.4)
NEUTROPHILS # BLD AUTO: 2.74 K/UL — SIGNIFICANT CHANGE UP (ref 1.8–7.4)
NEUTROPHILS NFR BLD AUTO: 63.7 % — SIGNIFICANT CHANGE UP (ref 43–77)
NEUTROPHILS NFR BLD AUTO: 63.7 % — SIGNIFICANT CHANGE UP (ref 43–77)
NRBC # BLD: 0 /100 WBCS — SIGNIFICANT CHANGE UP (ref 0–0)
NRBC # BLD: 0 /100 WBCS — SIGNIFICANT CHANGE UP (ref 0–0)
PLATELET # BLD AUTO: 168 K/UL — SIGNIFICANT CHANGE UP (ref 150–400)
PLATELET # BLD AUTO: 168 K/UL — SIGNIFICANT CHANGE UP (ref 150–400)
POTASSIUM SERPL-MCNC: 4.2 MMOL/L — SIGNIFICANT CHANGE UP (ref 3.5–5.3)
POTASSIUM SERPL-MCNC: 4.2 MMOL/L — SIGNIFICANT CHANGE UP (ref 3.5–5.3)
POTASSIUM SERPL-SCNC: 4.2 MMOL/L — SIGNIFICANT CHANGE UP (ref 3.5–5.3)
POTASSIUM SERPL-SCNC: 4.2 MMOL/L — SIGNIFICANT CHANGE UP (ref 3.5–5.3)
PROT SERPL-MCNC: 6.8 G/DL — SIGNIFICANT CHANGE UP (ref 6–8.3)
PROT SERPL-MCNC: 6.8 G/DL — SIGNIFICANT CHANGE UP (ref 6–8.3)
RBC # BLD: 2.42 M/UL — LOW (ref 4.2–5.8)
RBC # BLD: 2.42 M/UL — LOW (ref 4.2–5.8)
RBC # FLD: 22.6 % — HIGH (ref 10.3–14.5)
RBC # FLD: 22.6 % — HIGH (ref 10.3–14.5)
SODIUM SERPL-SCNC: 142 MMOL/L — SIGNIFICANT CHANGE UP (ref 135–145)
SODIUM SERPL-SCNC: 142 MMOL/L — SIGNIFICANT CHANGE UP (ref 135–145)
WBC # BLD: 4.3 K/UL — SIGNIFICANT CHANGE UP (ref 3.8–10.5)
WBC # BLD: 4.3 K/UL — SIGNIFICANT CHANGE UP (ref 3.8–10.5)
WBC # FLD AUTO: 4.3 K/UL — SIGNIFICANT CHANGE UP (ref 3.8–10.5)
WBC # FLD AUTO: 4.3 K/UL — SIGNIFICANT CHANGE UP (ref 3.8–10.5)

## 2023-12-16 ENCOUNTER — RX RENEWAL (OUTPATIENT)
Age: 83
End: 2023-12-16

## 2023-12-18 ENCOUNTER — RESULT REVIEW (OUTPATIENT)
Age: 83
End: 2023-12-18

## 2023-12-18 ENCOUNTER — APPOINTMENT (OUTPATIENT)
Dept: HEMATOLOGY ONCOLOGY | Facility: CLINIC | Age: 83
End: 2023-12-18

## 2023-12-18 ENCOUNTER — APPOINTMENT (OUTPATIENT)
Dept: INFUSION THERAPY | Facility: HOSPITAL | Age: 83
End: 2023-12-18

## 2023-12-18 LAB
BASOPHILS # BLD AUTO: 0.03 K/UL — SIGNIFICANT CHANGE UP (ref 0–0.2)
BASOPHILS # BLD AUTO: 0.03 K/UL — SIGNIFICANT CHANGE UP (ref 0–0.2)
BASOPHILS NFR BLD AUTO: 0.7 % — SIGNIFICANT CHANGE UP (ref 0–2)
BASOPHILS NFR BLD AUTO: 0.7 % — SIGNIFICANT CHANGE UP (ref 0–2)
EOSINOPHIL # BLD AUTO: 0.1 K/UL — SIGNIFICANT CHANGE UP (ref 0–0.5)
EOSINOPHIL # BLD AUTO: 0.1 K/UL — SIGNIFICANT CHANGE UP (ref 0–0.5)
EOSINOPHIL NFR BLD AUTO: 2.5 % — SIGNIFICANT CHANGE UP (ref 0–6)
EOSINOPHIL NFR BLD AUTO: 2.5 % — SIGNIFICANT CHANGE UP (ref 0–6)
HCT VFR BLD CALC: 26.2 % — LOW (ref 39–50)
HCT VFR BLD CALC: 26.2 % — LOW (ref 39–50)
HGB BLD-MCNC: 9.2 G/DL — LOW (ref 13–17)
HGB BLD-MCNC: 9.2 G/DL — LOW (ref 13–17)
IMM GRANULOCYTES NFR BLD AUTO: 1 % — HIGH (ref 0–0.9)
IMM GRANULOCYTES NFR BLD AUTO: 1 % — HIGH (ref 0–0.9)
LYMPHOCYTES # BLD AUTO: 1.25 K/UL — SIGNIFICANT CHANGE UP (ref 1–3.3)
LYMPHOCYTES # BLD AUTO: 1.25 K/UL — SIGNIFICANT CHANGE UP (ref 1–3.3)
LYMPHOCYTES # BLD AUTO: 30.7 % — SIGNIFICANT CHANGE UP (ref 13–44)
LYMPHOCYTES # BLD AUTO: 30.7 % — SIGNIFICANT CHANGE UP (ref 13–44)
MCHC RBC-ENTMCNC: 35.1 G/DL — SIGNIFICANT CHANGE UP (ref 32–36)
MCHC RBC-ENTMCNC: 35.1 G/DL — SIGNIFICANT CHANGE UP (ref 32–36)
MCHC RBC-ENTMCNC: 39.5 PG — HIGH (ref 27–34)
MCHC RBC-ENTMCNC: 39.5 PG — HIGH (ref 27–34)
MCV RBC AUTO: 112.4 FL — HIGH (ref 80–100)
MCV RBC AUTO: 112.4 FL — HIGH (ref 80–100)
MONOCYTES # BLD AUTO: 0.11 K/UL — SIGNIFICANT CHANGE UP (ref 0–0.9)
MONOCYTES # BLD AUTO: 0.11 K/UL — SIGNIFICANT CHANGE UP (ref 0–0.9)
MONOCYTES NFR BLD AUTO: 2.7 % — SIGNIFICANT CHANGE UP (ref 2–14)
MONOCYTES NFR BLD AUTO: 2.7 % — SIGNIFICANT CHANGE UP (ref 2–14)
NEUTROPHILS # BLD AUTO: 2.54 K/UL — SIGNIFICANT CHANGE UP (ref 1.8–7.4)
NEUTROPHILS # BLD AUTO: 2.54 K/UL — SIGNIFICANT CHANGE UP (ref 1.8–7.4)
NEUTROPHILS NFR BLD AUTO: 62.4 % — SIGNIFICANT CHANGE UP (ref 43–77)
NEUTROPHILS NFR BLD AUTO: 62.4 % — SIGNIFICANT CHANGE UP (ref 43–77)
NRBC # BLD: 0 /100 WBCS — SIGNIFICANT CHANGE UP (ref 0–0)
NRBC # BLD: 0 /100 WBCS — SIGNIFICANT CHANGE UP (ref 0–0)
PLATELET # BLD AUTO: 149 K/UL — LOW (ref 150–400)
PLATELET # BLD AUTO: 149 K/UL — LOW (ref 150–400)
RBC # BLD: 2.33 M/UL — LOW (ref 4.2–5.8)
RBC # BLD: 2.33 M/UL — LOW (ref 4.2–5.8)
RBC # FLD: 21.9 % — HIGH (ref 10.3–14.5)
RBC # FLD: 21.9 % — HIGH (ref 10.3–14.5)
WBC # BLD: 4.07 K/UL — SIGNIFICANT CHANGE UP (ref 3.8–10.5)
WBC # BLD: 4.07 K/UL — SIGNIFICANT CHANGE UP (ref 3.8–10.5)
WBC # FLD AUTO: 4.07 K/UL — SIGNIFICANT CHANGE UP (ref 3.8–10.5)
WBC # FLD AUTO: 4.07 K/UL — SIGNIFICANT CHANGE UP (ref 3.8–10.5)

## 2023-12-19 ENCOUNTER — TRANSCRIPTION ENCOUNTER (OUTPATIENT)
Age: 83
End: 2023-12-19

## 2023-12-20 ENCOUNTER — RESULT REVIEW (OUTPATIENT)
Age: 83
End: 2023-12-20

## 2023-12-20 ENCOUNTER — APPOINTMENT (OUTPATIENT)
Dept: INFUSION THERAPY | Facility: HOSPITAL | Age: 83
End: 2023-12-20

## 2023-12-20 ENCOUNTER — APPOINTMENT (OUTPATIENT)
Dept: HEMATOLOGY ONCOLOGY | Facility: CLINIC | Age: 83
End: 2023-12-20

## 2023-12-20 LAB
BASOPHILS # BLD AUTO: 0.02 K/UL — SIGNIFICANT CHANGE UP (ref 0–0.2)
BASOPHILS # BLD AUTO: 0.02 K/UL — SIGNIFICANT CHANGE UP (ref 0–0.2)
BASOPHILS NFR BLD AUTO: 0.5 % — SIGNIFICANT CHANGE UP (ref 0–2)
BASOPHILS NFR BLD AUTO: 0.5 % — SIGNIFICANT CHANGE UP (ref 0–2)
EOSINOPHIL # BLD AUTO: 0.16 K/UL — SIGNIFICANT CHANGE UP (ref 0–0.5)
EOSINOPHIL # BLD AUTO: 0.16 K/UL — SIGNIFICANT CHANGE UP (ref 0–0.5)
EOSINOPHIL NFR BLD AUTO: 3.6 % — SIGNIFICANT CHANGE UP (ref 0–6)
EOSINOPHIL NFR BLD AUTO: 3.6 % — SIGNIFICANT CHANGE UP (ref 0–6)
HCT VFR BLD CALC: 27.2 % — LOW (ref 39–50)
HCT VFR BLD CALC: 27.2 % — LOW (ref 39–50)
HGB BLD-MCNC: 9.6 G/DL — LOW (ref 13–17)
HGB BLD-MCNC: 9.6 G/DL — LOW (ref 13–17)
IMM GRANULOCYTES NFR BLD AUTO: 1.6 % — HIGH (ref 0–0.9)
IMM GRANULOCYTES NFR BLD AUTO: 1.6 % — HIGH (ref 0–0.9)
LYMPHOCYTES # BLD AUTO: 1.2 K/UL — SIGNIFICANT CHANGE UP (ref 1–3.3)
LYMPHOCYTES # BLD AUTO: 1.2 K/UL — SIGNIFICANT CHANGE UP (ref 1–3.3)
LYMPHOCYTES # BLD AUTO: 27.1 % — SIGNIFICANT CHANGE UP (ref 13–44)
LYMPHOCYTES # BLD AUTO: 27.1 % — SIGNIFICANT CHANGE UP (ref 13–44)
MCHC RBC-ENTMCNC: 35.3 G/DL — SIGNIFICANT CHANGE UP (ref 32–36)
MCHC RBC-ENTMCNC: 35.3 G/DL — SIGNIFICANT CHANGE UP (ref 32–36)
MCHC RBC-ENTMCNC: 39.2 PG — HIGH (ref 27–34)
MCHC RBC-ENTMCNC: 39.2 PG — HIGH (ref 27–34)
MCV RBC AUTO: 111 FL — HIGH (ref 80–100)
MCV RBC AUTO: 111 FL — HIGH (ref 80–100)
MONOCYTES # BLD AUTO: 0.13 K/UL — SIGNIFICANT CHANGE UP (ref 0–0.9)
MONOCYTES # BLD AUTO: 0.13 K/UL — SIGNIFICANT CHANGE UP (ref 0–0.9)
MONOCYTES NFR BLD AUTO: 2.9 % — SIGNIFICANT CHANGE UP (ref 2–14)
MONOCYTES NFR BLD AUTO: 2.9 % — SIGNIFICANT CHANGE UP (ref 2–14)
NEUTROPHILS # BLD AUTO: 2.85 K/UL — SIGNIFICANT CHANGE UP (ref 1.8–7.4)
NEUTROPHILS # BLD AUTO: 2.85 K/UL — SIGNIFICANT CHANGE UP (ref 1.8–7.4)
NEUTROPHILS NFR BLD AUTO: 64.3 % — SIGNIFICANT CHANGE UP (ref 43–77)
NEUTROPHILS NFR BLD AUTO: 64.3 % — SIGNIFICANT CHANGE UP (ref 43–77)
NRBC # BLD: 0 /100 WBCS — SIGNIFICANT CHANGE UP (ref 0–0)
NRBC # BLD: 0 /100 WBCS — SIGNIFICANT CHANGE UP (ref 0–0)
PLATELET # BLD AUTO: 127 K/UL — LOW (ref 150–400)
PLATELET # BLD AUTO: 127 K/UL — LOW (ref 150–400)
RBC # BLD: 2.45 M/UL — LOW (ref 4.2–5.8)
RBC # BLD: 2.45 M/UL — LOW (ref 4.2–5.8)
RBC # FLD: 21.4 % — HIGH (ref 10.3–14.5)
RBC # FLD: 21.4 % — HIGH (ref 10.3–14.5)
WBC # BLD: 4.43 K/UL — SIGNIFICANT CHANGE UP (ref 3.8–10.5)
WBC # BLD: 4.43 K/UL — SIGNIFICANT CHANGE UP (ref 3.8–10.5)
WBC # FLD AUTO: 4.43 K/UL — SIGNIFICANT CHANGE UP (ref 3.8–10.5)
WBC # FLD AUTO: 4.43 K/UL — SIGNIFICANT CHANGE UP (ref 3.8–10.5)

## 2023-12-22 ENCOUNTER — RESULT REVIEW (OUTPATIENT)
Age: 83
End: 2023-12-22

## 2023-12-22 ENCOUNTER — APPOINTMENT (OUTPATIENT)
Dept: INFUSION THERAPY | Facility: HOSPITAL | Age: 83
End: 2023-12-22

## 2023-12-22 ENCOUNTER — APPOINTMENT (OUTPATIENT)
Dept: HEMATOLOGY ONCOLOGY | Facility: CLINIC | Age: 83
End: 2023-12-22

## 2023-12-22 LAB
ANISOCYTOSIS BLD QL: SLIGHT — SIGNIFICANT CHANGE UP
ANISOCYTOSIS BLD QL: SLIGHT — SIGNIFICANT CHANGE UP
BASOPHILS # BLD AUTO: 0.05 K/UL — SIGNIFICANT CHANGE UP (ref 0–0.2)
BASOPHILS # BLD AUTO: 0.05 K/UL — SIGNIFICANT CHANGE UP (ref 0–0.2)
BASOPHILS NFR BLD AUTO: 1 % — SIGNIFICANT CHANGE UP (ref 0–2)
BASOPHILS NFR BLD AUTO: 1 % — SIGNIFICANT CHANGE UP (ref 0–2)
DACRYOCYTES BLD QL SMEAR: SLIGHT — SIGNIFICANT CHANGE UP
DACRYOCYTES BLD QL SMEAR: SLIGHT — SIGNIFICANT CHANGE UP
ELLIPTOCYTES BLD QL SMEAR: SLIGHT — SIGNIFICANT CHANGE UP
ELLIPTOCYTES BLD QL SMEAR: SLIGHT — SIGNIFICANT CHANGE UP
EOSINOPHIL # BLD AUTO: 0.15 K/UL — SIGNIFICANT CHANGE UP (ref 0–0.5)
EOSINOPHIL # BLD AUTO: 0.15 K/UL — SIGNIFICANT CHANGE UP (ref 0–0.5)
EOSINOPHIL NFR BLD AUTO: 3 % — SIGNIFICANT CHANGE UP (ref 0–6)
EOSINOPHIL NFR BLD AUTO: 3 % — SIGNIFICANT CHANGE UP (ref 0–6)
HCT VFR BLD CALC: 25.8 % — LOW (ref 39–50)
HCT VFR BLD CALC: 25.8 % — LOW (ref 39–50)
HGB BLD-MCNC: 9.2 G/DL — LOW (ref 13–17)
HGB BLD-MCNC: 9.2 G/DL — LOW (ref 13–17)
LYMPHOCYTES # BLD AUTO: 1.85 K/UL — SIGNIFICANT CHANGE UP (ref 1–3.3)
LYMPHOCYTES # BLD AUTO: 1.85 K/UL — SIGNIFICANT CHANGE UP (ref 1–3.3)
LYMPHOCYTES # BLD AUTO: 38 % — SIGNIFICANT CHANGE UP (ref 13–44)
LYMPHOCYTES # BLD AUTO: 38 % — SIGNIFICANT CHANGE UP (ref 13–44)
MACROCYTES BLD QL: SLIGHT — SIGNIFICANT CHANGE UP
MACROCYTES BLD QL: SLIGHT — SIGNIFICANT CHANGE UP
MCHC RBC-ENTMCNC: 35.7 G/DL — SIGNIFICANT CHANGE UP (ref 32–36)
MCHC RBC-ENTMCNC: 35.7 G/DL — SIGNIFICANT CHANGE UP (ref 32–36)
MCHC RBC-ENTMCNC: 39.7 PG — HIGH (ref 27–34)
MCHC RBC-ENTMCNC: 39.7 PG — HIGH (ref 27–34)
MCV RBC AUTO: 111.2 FL — HIGH (ref 80–100)
MCV RBC AUTO: 111.2 FL — HIGH (ref 80–100)
MONOCYTES # BLD AUTO: 0.19 K/UL — SIGNIFICANT CHANGE UP (ref 0–0.9)
MONOCYTES # BLD AUTO: 0.19 K/UL — SIGNIFICANT CHANGE UP (ref 0–0.9)
MONOCYTES NFR BLD AUTO: 4 % — SIGNIFICANT CHANGE UP (ref 2–14)
MONOCYTES NFR BLD AUTO: 4 % — SIGNIFICANT CHANGE UP (ref 2–14)
MYELOCYTES NFR BLD: 1 % — HIGH (ref 0–0)
MYELOCYTES NFR BLD: 1 % — HIGH (ref 0–0)
NEUTROPHILS # BLD AUTO: 2.58 K/UL — SIGNIFICANT CHANGE UP (ref 1.8–7.4)
NEUTROPHILS # BLD AUTO: 2.58 K/UL — SIGNIFICANT CHANGE UP (ref 1.8–7.4)
NEUTROPHILS NFR BLD AUTO: 53 % — SIGNIFICANT CHANGE UP (ref 43–77)
NEUTROPHILS NFR BLD AUTO: 53 % — SIGNIFICANT CHANGE UP (ref 43–77)
NRBC # BLD: 0 /100 — SIGNIFICANT CHANGE UP (ref 0–0)
NRBC # BLD: 0 /100 — SIGNIFICANT CHANGE UP (ref 0–0)
NRBC # BLD: SIGNIFICANT CHANGE UP /100 WBCS (ref 0–0)
NRBC # BLD: SIGNIFICANT CHANGE UP /100 WBCS (ref 0–0)
PLAT MORPH BLD: NORMAL — SIGNIFICANT CHANGE UP
PLAT MORPH BLD: NORMAL — SIGNIFICANT CHANGE UP
PLATELET # BLD AUTO: 102 K/UL — LOW (ref 150–400)
PLATELET # BLD AUTO: 102 K/UL — LOW (ref 150–400)
POIKILOCYTOSIS BLD QL AUTO: SLIGHT — SIGNIFICANT CHANGE UP
POIKILOCYTOSIS BLD QL AUTO: SLIGHT — SIGNIFICANT CHANGE UP
RBC # BLD: 2.32 M/UL — LOW (ref 4.2–5.8)
RBC # BLD: 2.32 M/UL — LOW (ref 4.2–5.8)
RBC # FLD: 21.5 % — HIGH (ref 10.3–14.5)
RBC # FLD: 21.5 % — HIGH (ref 10.3–14.5)
RBC BLD AUTO: ABNORMAL
RBC BLD AUTO: ABNORMAL
SCHISTOCYTES BLD QL AUTO: SLIGHT — SIGNIFICANT CHANGE UP
SCHISTOCYTES BLD QL AUTO: SLIGHT — SIGNIFICANT CHANGE UP
WBC # BLD: 4.87 K/UL — SIGNIFICANT CHANGE UP (ref 3.8–10.5)
WBC # BLD: 4.87 K/UL — SIGNIFICANT CHANGE UP (ref 3.8–10.5)
WBC # FLD AUTO: 4.87 K/UL — SIGNIFICANT CHANGE UP (ref 3.8–10.5)
WBC # FLD AUTO: 4.87 K/UL — SIGNIFICANT CHANGE UP (ref 3.8–10.5)

## 2023-12-26 ENCOUNTER — APPOINTMENT (OUTPATIENT)
Dept: INFUSION THERAPY | Facility: HOSPITAL | Age: 83
End: 2023-12-26

## 2023-12-26 ENCOUNTER — RESULT REVIEW (OUTPATIENT)
Age: 83
End: 2023-12-26

## 2023-12-26 ENCOUNTER — APPOINTMENT (OUTPATIENT)
Dept: HEMATOLOGY ONCOLOGY | Facility: CLINIC | Age: 83
End: 2023-12-26

## 2023-12-26 ENCOUNTER — NON-APPOINTMENT (OUTPATIENT)
Age: 83
End: 2023-12-26

## 2023-12-26 LAB
ANISOCYTOSIS BLD QL: SLIGHT — SIGNIFICANT CHANGE UP
ANISOCYTOSIS BLD QL: SLIGHT — SIGNIFICANT CHANGE UP
BASOPHILS # BLD AUTO: 0 K/UL — SIGNIFICANT CHANGE UP (ref 0–0.2)
BASOPHILS # BLD AUTO: 0 K/UL — SIGNIFICANT CHANGE UP (ref 0–0.2)
BASOPHILS NFR BLD AUTO: 0 % — SIGNIFICANT CHANGE UP (ref 0–2)
BASOPHILS NFR BLD AUTO: 0 % — SIGNIFICANT CHANGE UP (ref 0–2)
DACRYOCYTES BLD QL SMEAR: SLIGHT — SIGNIFICANT CHANGE UP
DACRYOCYTES BLD QL SMEAR: SLIGHT — SIGNIFICANT CHANGE UP
ELLIPTOCYTES BLD QL SMEAR: SLIGHT — SIGNIFICANT CHANGE UP
ELLIPTOCYTES BLD QL SMEAR: SLIGHT — SIGNIFICANT CHANGE UP
EOSINOPHIL # BLD AUTO: 0.16 K/UL — SIGNIFICANT CHANGE UP (ref 0–0.5)
EOSINOPHIL # BLD AUTO: 0.16 K/UL — SIGNIFICANT CHANGE UP (ref 0–0.5)
EOSINOPHIL NFR BLD AUTO: 4 % — SIGNIFICANT CHANGE UP (ref 0–6)
EOSINOPHIL NFR BLD AUTO: 4 % — SIGNIFICANT CHANGE UP (ref 0–6)
HCT VFR BLD CALC: 24.6 % — LOW (ref 39–50)
HCT VFR BLD CALC: 24.6 % — LOW (ref 39–50)
HGB BLD-MCNC: 8.7 G/DL — LOW (ref 13–17)
HGB BLD-MCNC: 8.7 G/DL — LOW (ref 13–17)
LYMPHOCYTES # BLD AUTO: 1.88 K/UL — SIGNIFICANT CHANGE UP (ref 1–3.3)
LYMPHOCYTES # BLD AUTO: 1.88 K/UL — SIGNIFICANT CHANGE UP (ref 1–3.3)
LYMPHOCYTES # BLD AUTO: 47 % — HIGH (ref 13–44)
LYMPHOCYTES # BLD AUTO: 47 % — HIGH (ref 13–44)
MACROCYTES BLD QL: SLIGHT — SIGNIFICANT CHANGE UP
MACROCYTES BLD QL: SLIGHT — SIGNIFICANT CHANGE UP
MCHC RBC-ENTMCNC: 35.4 G/DL — SIGNIFICANT CHANGE UP (ref 32–36)
MCHC RBC-ENTMCNC: 35.4 G/DL — SIGNIFICANT CHANGE UP (ref 32–36)
MCHC RBC-ENTMCNC: 39.7 PG — HIGH (ref 27–34)
MCHC RBC-ENTMCNC: 39.7 PG — HIGH (ref 27–34)
MCV RBC AUTO: 112.3 FL — HIGH (ref 80–100)
MCV RBC AUTO: 112.3 FL — HIGH (ref 80–100)
MONOCYTES # BLD AUTO: 0.2 K/UL — SIGNIFICANT CHANGE UP (ref 0–0.9)
MONOCYTES # BLD AUTO: 0.2 K/UL — SIGNIFICANT CHANGE UP (ref 0–0.9)
MONOCYTES NFR BLD AUTO: 5 % — SIGNIFICANT CHANGE UP (ref 2–14)
MONOCYTES NFR BLD AUTO: 5 % — SIGNIFICANT CHANGE UP (ref 2–14)
NEUTROPHILS # BLD AUTO: 1.76 K/UL — LOW (ref 1.8–7.4)
NEUTROPHILS # BLD AUTO: 1.76 K/UL — LOW (ref 1.8–7.4)
NEUTROPHILS NFR BLD AUTO: 44 % — SIGNIFICANT CHANGE UP (ref 43–77)
NEUTROPHILS NFR BLD AUTO: 44 % — SIGNIFICANT CHANGE UP (ref 43–77)
NRBC # BLD: 0 /100 WBCS — SIGNIFICANT CHANGE UP (ref 0–0)
NRBC # BLD: 0 /100 WBCS — SIGNIFICANT CHANGE UP (ref 0–0)
NRBC # BLD: SIGNIFICANT CHANGE UP /100 WBCS (ref 0–0)
NRBC # BLD: SIGNIFICANT CHANGE UP /100 WBCS (ref 0–0)
PLAT MORPH BLD: NORMAL — SIGNIFICANT CHANGE UP
PLAT MORPH BLD: NORMAL — SIGNIFICANT CHANGE UP
PLATELET # BLD AUTO: 56 K/UL — LOW (ref 150–400)
PLATELET # BLD AUTO: 56 K/UL — LOW (ref 150–400)
POIKILOCYTOSIS BLD QL AUTO: SLIGHT — SIGNIFICANT CHANGE UP
POIKILOCYTOSIS BLD QL AUTO: SLIGHT — SIGNIFICANT CHANGE UP
RBC # BLD: 2.19 M/UL — LOW (ref 4.2–5.8)
RBC # BLD: 2.19 M/UL — LOW (ref 4.2–5.8)
RBC # FLD: 22.2 % — HIGH (ref 10.3–14.5)
RBC # FLD: 22.2 % — HIGH (ref 10.3–14.5)
RBC BLD AUTO: ABNORMAL
RBC BLD AUTO: ABNORMAL
SCHISTOCYTES BLD QL AUTO: SLIGHT — SIGNIFICANT CHANGE UP
SCHISTOCYTES BLD QL AUTO: SLIGHT — SIGNIFICANT CHANGE UP
WBC # BLD: 3.99 K/UL — SIGNIFICANT CHANGE UP (ref 3.8–10.5)
WBC # BLD: 3.99 K/UL — SIGNIFICANT CHANGE UP (ref 3.8–10.5)
WBC # FLD AUTO: 3.99 K/UL — SIGNIFICANT CHANGE UP (ref 3.8–10.5)
WBC # FLD AUTO: 3.99 K/UL — SIGNIFICANT CHANGE UP (ref 3.8–10.5)

## 2023-12-28 ENCOUNTER — APPOINTMENT (OUTPATIENT)
Dept: INFUSION THERAPY | Facility: HOSPITAL | Age: 83
End: 2023-12-28

## 2023-12-28 ENCOUNTER — APPOINTMENT (OUTPATIENT)
Dept: HEMATOLOGY ONCOLOGY | Facility: CLINIC | Age: 83
End: 2023-12-28

## 2023-12-30 ENCOUNTER — APPOINTMENT (OUTPATIENT)
Dept: HEMATOLOGY ONCOLOGY | Facility: CLINIC | Age: 83
End: 2023-12-30

## 2023-12-30 ENCOUNTER — RESULT REVIEW (OUTPATIENT)
Age: 83
End: 2023-12-30

## 2023-12-30 ENCOUNTER — APPOINTMENT (OUTPATIENT)
Dept: INFUSION THERAPY | Facility: HOSPITAL | Age: 83
End: 2023-12-30

## 2023-12-30 LAB
ANISOCYTOSIS BLD QL: SLIGHT — SIGNIFICANT CHANGE UP
ANISOCYTOSIS BLD QL: SLIGHT — SIGNIFICANT CHANGE UP
BASOPHILS # BLD AUTO: 0 K/UL — SIGNIFICANT CHANGE UP (ref 0–0.2)
BASOPHILS # BLD AUTO: 0 K/UL — SIGNIFICANT CHANGE UP (ref 0–0.2)
BASOPHILS NFR BLD AUTO: 0 % — SIGNIFICANT CHANGE UP (ref 0–2)
BASOPHILS NFR BLD AUTO: 0 % — SIGNIFICANT CHANGE UP (ref 0–2)
DACRYOCYTES BLD QL SMEAR: SLIGHT — SIGNIFICANT CHANGE UP
DACRYOCYTES BLD QL SMEAR: SLIGHT — SIGNIFICANT CHANGE UP
ELLIPTOCYTES BLD QL SMEAR: SLIGHT — SIGNIFICANT CHANGE UP
ELLIPTOCYTES BLD QL SMEAR: SLIGHT — SIGNIFICANT CHANGE UP
EOSINOPHIL # BLD AUTO: 0.04 K/UL — SIGNIFICANT CHANGE UP (ref 0–0.5)
EOSINOPHIL # BLD AUTO: 0.04 K/UL — SIGNIFICANT CHANGE UP (ref 0–0.5)
EOSINOPHIL NFR BLD AUTO: 2 % — SIGNIFICANT CHANGE UP (ref 0–6)
EOSINOPHIL NFR BLD AUTO: 2 % — SIGNIFICANT CHANGE UP (ref 0–6)
HCT VFR BLD CALC: 24.2 % — LOW (ref 39–50)
HCT VFR BLD CALC: 24.2 % — LOW (ref 39–50)
HGB BLD-MCNC: 8.3 G/DL — LOW (ref 13–17)
HGB BLD-MCNC: 8.3 G/DL — LOW (ref 13–17)
HYPOCHROMIA BLD QL: SLIGHT — SIGNIFICANT CHANGE UP
HYPOCHROMIA BLD QL: SLIGHT — SIGNIFICANT CHANGE UP
LYMPHOCYTES # BLD AUTO: 1.2 K/UL — SIGNIFICANT CHANGE UP (ref 1–3.3)
LYMPHOCYTES # BLD AUTO: 1.2 K/UL — SIGNIFICANT CHANGE UP (ref 1–3.3)
LYMPHOCYTES # BLD AUTO: 54 % — HIGH (ref 13–44)
LYMPHOCYTES # BLD AUTO: 54 % — HIGH (ref 13–44)
MACROCYTES BLD QL: SLIGHT — SIGNIFICANT CHANGE UP
MACROCYTES BLD QL: SLIGHT — SIGNIFICANT CHANGE UP
MCHC RBC-ENTMCNC: 34.3 G/DL — SIGNIFICANT CHANGE UP (ref 32–36)
MCHC RBC-ENTMCNC: 34.3 G/DL — SIGNIFICANT CHANGE UP (ref 32–36)
MCHC RBC-ENTMCNC: 39.5 PG — HIGH (ref 27–34)
MCHC RBC-ENTMCNC: 39.5 PG — HIGH (ref 27–34)
MCV RBC AUTO: 115.2 FL — HIGH (ref 80–100)
MCV RBC AUTO: 115.2 FL — HIGH (ref 80–100)
MONOCYTES # BLD AUTO: 0.11 K/UL — SIGNIFICANT CHANGE UP (ref 0–0.9)
MONOCYTES # BLD AUTO: 0.11 K/UL — SIGNIFICANT CHANGE UP (ref 0–0.9)
MONOCYTES NFR BLD AUTO: 5 % — SIGNIFICANT CHANGE UP (ref 2–14)
MONOCYTES NFR BLD AUTO: 5 % — SIGNIFICANT CHANGE UP (ref 2–14)
NEUTROPHILS # BLD AUTO: 0.87 K/UL — LOW (ref 1.8–7.4)
NEUTROPHILS # BLD AUTO: 0.87 K/UL — LOW (ref 1.8–7.4)
NEUTROPHILS NFR BLD AUTO: 39 % — LOW (ref 43–77)
NEUTROPHILS NFR BLD AUTO: 39 % — LOW (ref 43–77)
NRBC # BLD: 0 /100 WBCS — SIGNIFICANT CHANGE UP (ref 0–0)
NRBC # BLD: 0 /100 WBCS — SIGNIFICANT CHANGE UP (ref 0–0)
NRBC # BLD: SIGNIFICANT CHANGE UP /100 WBCS (ref 0–0)
NRBC # BLD: SIGNIFICANT CHANGE UP /100 WBCS (ref 0–0)
PLAT MORPH BLD: NORMAL — SIGNIFICANT CHANGE UP
PLAT MORPH BLD: NORMAL — SIGNIFICANT CHANGE UP
PLATELET # BLD AUTO: 54 K/UL — LOW (ref 150–400)
PLATELET # BLD AUTO: 54 K/UL — LOW (ref 150–400)
POIKILOCYTOSIS BLD QL AUTO: SLIGHT — SIGNIFICANT CHANGE UP
POIKILOCYTOSIS BLD QL AUTO: SLIGHT — SIGNIFICANT CHANGE UP
RBC # BLD: 2.1 M/UL — LOW (ref 4.2–5.8)
RBC # BLD: 2.1 M/UL — LOW (ref 4.2–5.8)
RBC # FLD: 22.9 % — HIGH (ref 10.3–14.5)
RBC # FLD: 22.9 % — HIGH (ref 10.3–14.5)
RBC BLD AUTO: ABNORMAL
RBC BLD AUTO: ABNORMAL
SCHISTOCYTES BLD QL AUTO: SLIGHT — SIGNIFICANT CHANGE UP
SCHISTOCYTES BLD QL AUTO: SLIGHT — SIGNIFICANT CHANGE UP
WBC # BLD: 2.22 K/UL — LOW (ref 3.8–10.5)
WBC # BLD: 2.22 K/UL — LOW (ref 3.8–10.5)
WBC # FLD AUTO: 2.22 K/UL — LOW (ref 3.8–10.5)
WBC # FLD AUTO: 2.22 K/UL — LOW (ref 3.8–10.5)

## 2024-01-02 ENCOUNTER — APPOINTMENT (OUTPATIENT)
Dept: HEMATOLOGY ONCOLOGY | Facility: CLINIC | Age: 84
End: 2024-01-02

## 2024-01-02 ENCOUNTER — RESULT REVIEW (OUTPATIENT)
Age: 84
End: 2024-01-02

## 2024-01-02 ENCOUNTER — APPOINTMENT (OUTPATIENT)
Dept: INFUSION THERAPY | Facility: HOSPITAL | Age: 84
End: 2024-01-02

## 2024-01-02 LAB
ANION GAP SERPL CALC-SCNC: 10 MMOL/L
ANISOCYTOSIS BLD QL: SLIGHT — SIGNIFICANT CHANGE UP
ANISOCYTOSIS BLD QL: SLIGHT — SIGNIFICANT CHANGE UP
APPEARANCE: CLEAR
BASOPHILS # BLD AUTO: 0 K/UL
BASOPHILS # BLD AUTO: 0.02 K/UL — SIGNIFICANT CHANGE UP (ref 0–0.2)
BASOPHILS # BLD AUTO: 0.02 K/UL — SIGNIFICANT CHANGE UP (ref 0–0.2)
BASOPHILS NFR BLD AUTO: 0 %
BASOPHILS NFR BLD AUTO: 0.9 % — SIGNIFICANT CHANGE UP (ref 0–2)
BASOPHILS NFR BLD AUTO: 0.9 % — SIGNIFICANT CHANGE UP (ref 0–2)
BILIRUBIN URINE: NEGATIVE
BLOOD URINE: NEGATIVE
BUN SERPL-MCNC: 32 MG/DL
CALCIUM SERPL-MCNC: 8.8 MG/DL
CHLORIDE SERPL-SCNC: 105 MMOL/L
CO2 SERPL-SCNC: 25 MMOL/L
COLOR: YELLOW
CREAT SERPL-MCNC: 1.91 MG/DL
DACRYOCYTES BLD QL SMEAR: SLIGHT — SIGNIFICANT CHANGE UP
DACRYOCYTES BLD QL SMEAR: SLIGHT — SIGNIFICANT CHANGE UP
EGFR: 35 ML/MIN/1.73M2
ELLIPTOCYTES BLD QL SMEAR: SLIGHT — SIGNIFICANT CHANGE UP
ELLIPTOCYTES BLD QL SMEAR: SLIGHT — SIGNIFICANT CHANGE UP
EOSINOPHIL # BLD AUTO: 0 K/UL
EOSINOPHIL # BLD AUTO: 0.04 K/UL — SIGNIFICANT CHANGE UP (ref 0–0.5)
EOSINOPHIL # BLD AUTO: 0.04 K/UL — SIGNIFICANT CHANGE UP (ref 0–0.5)
EOSINOPHIL NFR BLD AUTO: 0 %
EOSINOPHIL NFR BLD AUTO: 1.9 % — SIGNIFICANT CHANGE UP (ref 0–6)
EOSINOPHIL NFR BLD AUTO: 1.9 % — SIGNIFICANT CHANGE UP (ref 0–6)
GLUCOSE QUALITATIVE U: NEGATIVE MG/DL
GLUCOSE SERPL-MCNC: 96 MG/DL
HCT VFR BLD CALC: 24.3 % — LOW (ref 39–50)
HCT VFR BLD CALC: 24.3 % — LOW (ref 39–50)
HCT VFR BLD CALC: 31.9 %
HGB BLD-MCNC: 10.3 G/DL
HGB BLD-MCNC: 8.6 G/DL — LOW (ref 13–17)
HGB BLD-MCNC: 8.6 G/DL — LOW (ref 13–17)
IMM GRANULOCYTES NFR BLD AUTO: 1.9 % — HIGH (ref 0–0.9)
IMM GRANULOCYTES NFR BLD AUTO: 1.9 % — HIGH (ref 0–0.9)
KETONES URINE: NEGATIVE MG/DL
LEUKOCYTE ESTERASE URINE: ABNORMAL
LYMPHOCYTES # BLD AUTO: 0.99 K/UL
LYMPHOCYTES # BLD AUTO: 1.26 K/UL — SIGNIFICANT CHANGE UP (ref 1–3.3)
LYMPHOCYTES # BLD AUTO: 1.26 K/UL — SIGNIFICANT CHANGE UP (ref 1–3.3)
LYMPHOCYTES # BLD AUTO: 59.7 % — HIGH (ref 13–44)
LYMPHOCYTES # BLD AUTO: 59.7 % — HIGH (ref 13–44)
LYMPHOCYTES NFR BLD AUTO: 20.9 %
MACROCYTES BLD QL: SLIGHT — SIGNIFICANT CHANGE UP
MACROCYTES BLD QL: SLIGHT — SIGNIFICANT CHANGE UP
MAN DIFF?: NORMAL
MCHC RBC-ENTMCNC: 32.3 GM/DL
MCHC RBC-ENTMCNC: 35.4 G/DL — SIGNIFICANT CHANGE UP (ref 32–36)
MCHC RBC-ENTMCNC: 35.4 G/DL — SIGNIFICANT CHANGE UP (ref 32–36)
MCHC RBC-ENTMCNC: 38.4 PG
MCHC RBC-ENTMCNC: 41.1 PG — HIGH (ref 27–34)
MCHC RBC-ENTMCNC: 41.1 PG — HIGH (ref 27–34)
MCV RBC AUTO: 116.3 FL — HIGH (ref 80–100)
MCV RBC AUTO: 116.3 FL — HIGH (ref 80–100)
MCV RBC AUTO: 119 FL
MONOCYTES # BLD AUTO: 0.1 K/UL — SIGNIFICANT CHANGE UP (ref 0–0.9)
MONOCYTES # BLD AUTO: 0.1 K/UL — SIGNIFICANT CHANGE UP (ref 0–0.9)
MONOCYTES # BLD AUTO: 0.54 K/UL
MONOCYTES NFR BLD AUTO: 11.3 %
MONOCYTES NFR BLD AUTO: 4.7 % — SIGNIFICANT CHANGE UP (ref 2–14)
MONOCYTES NFR BLD AUTO: 4.7 % — SIGNIFICANT CHANGE UP (ref 2–14)
NEUTROPHILS # BLD AUTO: 0.65 K/UL — LOW (ref 1.8–7.4)
NEUTROPHILS # BLD AUTO: 0.65 K/UL — LOW (ref 1.8–7.4)
NEUTROPHILS # BLD AUTO: 3.1 K/UL
NEUTROPHILS NFR BLD AUTO: 30.9 % — LOW (ref 43–77)
NEUTROPHILS NFR BLD AUTO: 30.9 % — LOW (ref 43–77)
NEUTROPHILS NFR BLD AUTO: 63.5 %
NITRITE URINE: NEGATIVE
NRBC # BLD: 0 /100 WBCS — SIGNIFICANT CHANGE UP (ref 0–0)
NRBC # BLD: 0 /100 WBCS — SIGNIFICANT CHANGE UP (ref 0–0)
PH URINE: 5.5
PLAT MORPH BLD: NORMAL — SIGNIFICANT CHANGE UP
PLAT MORPH BLD: NORMAL — SIGNIFICANT CHANGE UP
PLATELET # BLD AUTO: 171 K/UL
PLATELET # BLD AUTO: 96 K/UL — LOW (ref 150–400)
PLATELET # BLD AUTO: 96 K/UL — LOW (ref 150–400)
POIKILOCYTOSIS BLD QL AUTO: SLIGHT — SIGNIFICANT CHANGE UP
POIKILOCYTOSIS BLD QL AUTO: SLIGHT — SIGNIFICANT CHANGE UP
POTASSIUM SERPL-SCNC: 5.4 MMOL/L
PROTEIN URINE: NORMAL MG/DL
RBC # BLD: 2.09 M/UL — LOW (ref 4.2–5.8)
RBC # BLD: 2.09 M/UL — LOW (ref 4.2–5.8)
RBC # BLD: 2.68 M/UL
RBC # FLD: 23.3 % — HIGH (ref 10.3–14.5)
RBC # FLD: 23.3 % — HIGH (ref 10.3–14.5)
RBC # FLD: 24.2 %
RBC BLD AUTO: ABNORMAL
RBC BLD AUTO: ABNORMAL
SCHISTOCYTES BLD QL AUTO: SLIGHT — SIGNIFICANT CHANGE UP
SCHISTOCYTES BLD QL AUTO: SLIGHT — SIGNIFICANT CHANGE UP
SODIUM SERPL-SCNC: 140 MMOL/L
SPECIFIC GRAVITY URINE: 1.01
UROBILINOGEN URINE: 0.2 MG/DL
WBC # BLD: 2.11 K/UL — LOW (ref 3.8–10.5)
WBC # BLD: 2.11 K/UL — LOW (ref 3.8–10.5)
WBC # FLD AUTO: 2.11 K/UL — LOW (ref 3.8–10.5)
WBC # FLD AUTO: 2.11 K/UL — LOW (ref 3.8–10.5)
WBC # FLD AUTO: 4.76 K/UL

## 2024-01-04 ENCOUNTER — APPOINTMENT (OUTPATIENT)
Dept: HEMATOLOGY ONCOLOGY | Facility: CLINIC | Age: 84
End: 2024-01-04

## 2024-01-04 ENCOUNTER — APPOINTMENT (OUTPATIENT)
Dept: INFUSION THERAPY | Facility: HOSPITAL | Age: 84
End: 2024-01-04

## 2024-01-06 ENCOUNTER — RESULT REVIEW (OUTPATIENT)
Age: 84
End: 2024-01-06

## 2024-01-06 ENCOUNTER — APPOINTMENT (OUTPATIENT)
Dept: INFUSION THERAPY | Facility: HOSPITAL | Age: 84
End: 2024-01-06

## 2024-01-06 ENCOUNTER — APPOINTMENT (OUTPATIENT)
Dept: HEMATOLOGY ONCOLOGY | Facility: CLINIC | Age: 84
End: 2024-01-06

## 2024-01-06 LAB
ANISOCYTOSIS BLD QL: SLIGHT — SIGNIFICANT CHANGE UP
ANISOCYTOSIS BLD QL: SLIGHT — SIGNIFICANT CHANGE UP
BASOPHILS # BLD AUTO: 0.06 K/UL — SIGNIFICANT CHANGE UP (ref 0–0.2)
BASOPHILS # BLD AUTO: 0.06 K/UL — SIGNIFICANT CHANGE UP (ref 0–0.2)
BASOPHILS NFR BLD AUTO: 2.4 % — HIGH (ref 0–2)
BASOPHILS NFR BLD AUTO: 2.4 % — HIGH (ref 0–2)
DACRYOCYTES BLD QL SMEAR: SLIGHT — SIGNIFICANT CHANGE UP
DACRYOCYTES BLD QL SMEAR: SLIGHT — SIGNIFICANT CHANGE UP
ELLIPTOCYTES BLD QL SMEAR: SLIGHT — SIGNIFICANT CHANGE UP
ELLIPTOCYTES BLD QL SMEAR: SLIGHT — SIGNIFICANT CHANGE UP
EOSINOPHIL # BLD AUTO: 0.04 K/UL — SIGNIFICANT CHANGE UP (ref 0–0.5)
EOSINOPHIL # BLD AUTO: 0.04 K/UL — SIGNIFICANT CHANGE UP (ref 0–0.5)
EOSINOPHIL NFR BLD AUTO: 1.6 % — SIGNIFICANT CHANGE UP (ref 0–6)
EOSINOPHIL NFR BLD AUTO: 1.6 % — SIGNIFICANT CHANGE UP (ref 0–6)
HCT VFR BLD CALC: 28 % — LOW (ref 39–50)
HCT VFR BLD CALC: 28 % — LOW (ref 39–50)
HGB BLD-MCNC: 9.5 G/DL — LOW (ref 13–17)
HGB BLD-MCNC: 9.5 G/DL — LOW (ref 13–17)
IMM GRANULOCYTES NFR BLD AUTO: 2 % — HIGH (ref 0–0.9)
IMM GRANULOCYTES NFR BLD AUTO: 2 % — HIGH (ref 0–0.9)
LYMPHOCYTES # BLD AUTO: 1.37 K/UL — SIGNIFICANT CHANGE UP (ref 1–3.3)
LYMPHOCYTES # BLD AUTO: 1.37 K/UL — SIGNIFICANT CHANGE UP (ref 1–3.3)
LYMPHOCYTES # BLD AUTO: 55.2 % — HIGH (ref 13–44)
LYMPHOCYTES # BLD AUTO: 55.2 % — HIGH (ref 13–44)
MACROCYTES BLD QL: SLIGHT — SIGNIFICANT CHANGE UP
MACROCYTES BLD QL: SLIGHT — SIGNIFICANT CHANGE UP
MCHC RBC-ENTMCNC: 33.9 G/DL — SIGNIFICANT CHANGE UP (ref 32–36)
MCHC RBC-ENTMCNC: 33.9 G/DL — SIGNIFICANT CHANGE UP (ref 32–36)
MCHC RBC-ENTMCNC: 39.6 PG — HIGH (ref 27–34)
MCHC RBC-ENTMCNC: 39.6 PG — HIGH (ref 27–34)
MCV RBC AUTO: 116.7 FL — HIGH (ref 80–100)
MCV RBC AUTO: 116.7 FL — HIGH (ref 80–100)
MONOCYTES # BLD AUTO: 0.1 K/UL — SIGNIFICANT CHANGE UP (ref 0–0.9)
MONOCYTES # BLD AUTO: 0.1 K/UL — SIGNIFICANT CHANGE UP (ref 0–0.9)
MONOCYTES NFR BLD AUTO: 4 % — SIGNIFICANT CHANGE UP (ref 2–14)
MONOCYTES NFR BLD AUTO: 4 % — SIGNIFICANT CHANGE UP (ref 2–14)
NEUTROPHILS # BLD AUTO: 0.86 K/UL — LOW (ref 1.8–7.4)
NEUTROPHILS # BLD AUTO: 0.86 K/UL — LOW (ref 1.8–7.4)
NEUTROPHILS NFR BLD AUTO: 34.8 % — LOW (ref 43–77)
NEUTROPHILS NFR BLD AUTO: 34.8 % — LOW (ref 43–77)
NRBC # BLD: 0 /100 WBCS — SIGNIFICANT CHANGE UP (ref 0–0)
NRBC # BLD: 0 /100 WBCS — SIGNIFICANT CHANGE UP (ref 0–0)
PLAT MORPH BLD: NORMAL — SIGNIFICANT CHANGE UP
PLAT MORPH BLD: NORMAL — SIGNIFICANT CHANGE UP
PLATELET # BLD AUTO: 164 K/UL — SIGNIFICANT CHANGE UP (ref 150–400)
PLATELET # BLD AUTO: 164 K/UL — SIGNIFICANT CHANGE UP (ref 150–400)
POIKILOCYTOSIS BLD QL AUTO: SLIGHT — SIGNIFICANT CHANGE UP
POIKILOCYTOSIS BLD QL AUTO: SLIGHT — SIGNIFICANT CHANGE UP
RBC # BLD: 2.4 M/UL — LOW (ref 4.2–5.8)
RBC # BLD: 2.4 M/UL — LOW (ref 4.2–5.8)
RBC # FLD: 22.3 % — HIGH (ref 10.3–14.5)
RBC # FLD: 22.3 % — HIGH (ref 10.3–14.5)
RBC BLD AUTO: ABNORMAL
RBC BLD AUTO: ABNORMAL
SCHISTOCYTES BLD QL AUTO: SLIGHT — SIGNIFICANT CHANGE UP
SCHISTOCYTES BLD QL AUTO: SLIGHT — SIGNIFICANT CHANGE UP
WBC # BLD: 2.48 K/UL — LOW (ref 3.8–10.5)
WBC # BLD: 2.48 K/UL — LOW (ref 3.8–10.5)
WBC # FLD AUTO: 2.48 K/UL — LOW (ref 3.8–10.5)
WBC # FLD AUTO: 2.48 K/UL — LOW (ref 3.8–10.5)

## 2024-01-08 ENCOUNTER — RESULT REVIEW (OUTPATIENT)
Age: 84
End: 2024-01-08

## 2024-01-08 ENCOUNTER — APPOINTMENT (OUTPATIENT)
Dept: HEMATOLOGY ONCOLOGY | Facility: CLINIC | Age: 84
End: 2024-01-08

## 2024-01-08 ENCOUNTER — APPOINTMENT (OUTPATIENT)
Dept: INFUSION THERAPY | Facility: HOSPITAL | Age: 84
End: 2024-01-08

## 2024-01-08 LAB
ANISOCYTOSIS BLD QL: SIGNIFICANT CHANGE UP
ANISOCYTOSIS BLD QL: SIGNIFICANT CHANGE UP
BASO STIPL BLD QL SMEAR: PRESENT — SIGNIFICANT CHANGE UP
BASO STIPL BLD QL SMEAR: PRESENT — SIGNIFICANT CHANGE UP
BASOPHILS # BLD AUTO: 0.06 K/UL — SIGNIFICANT CHANGE UP (ref 0–0.2)
BASOPHILS NFR BLD AUTO: 2 % — SIGNIFICANT CHANGE UP (ref 0–2)
BASOPHILS NFR BLD AUTO: 2 % — SIGNIFICANT CHANGE UP (ref 0–2)
BASOPHILS NFR BLD AUTO: 2.4 % — HIGH (ref 0–2)
BASOPHILS NFR BLD AUTO: 2.4 % — HIGH (ref 0–2)
DACRYOCYTES BLD QL SMEAR: SLIGHT — SIGNIFICANT CHANGE UP
DACRYOCYTES BLD QL SMEAR: SLIGHT — SIGNIFICANT CHANGE UP
ELLIPTOCYTES BLD QL SMEAR: SLIGHT — SIGNIFICANT CHANGE UP
ELLIPTOCYTES BLD QL SMEAR: SLIGHT — SIGNIFICANT CHANGE UP
EOSINOPHIL # BLD AUTO: 0.03 K/UL — SIGNIFICANT CHANGE UP (ref 0–0.5)
EOSINOPHIL # BLD AUTO: 0.03 K/UL — SIGNIFICANT CHANGE UP (ref 0–0.5)
EOSINOPHIL # BLD AUTO: 0.05 K/UL — SIGNIFICANT CHANGE UP (ref 0–0.5)
EOSINOPHIL # BLD AUTO: 0.05 K/UL — SIGNIFICANT CHANGE UP (ref 0–0.5)
EOSINOPHIL NFR BLD AUTO: 1 % — SIGNIFICANT CHANGE UP (ref 0–6)
EOSINOPHIL NFR BLD AUTO: 1 % — SIGNIFICANT CHANGE UP (ref 0–6)
EOSINOPHIL NFR BLD AUTO: 2 % — SIGNIFICANT CHANGE UP (ref 0–6)
EOSINOPHIL NFR BLD AUTO: 2 % — SIGNIFICANT CHANGE UP (ref 0–6)
HCT VFR BLD CALC: 28.9 % — LOW (ref 39–50)
HCT VFR BLD CALC: 28.9 % — LOW (ref 39–50)
HCT VFR BLD CALC: 30.4 % — LOW (ref 39–50)
HCT VFR BLD CALC: 30.4 % — LOW (ref 39–50)
HGB BLD-MCNC: 10 G/DL — LOW (ref 13–17)
IMM GRANULOCYTES NFR BLD AUTO: 1.6 % — HIGH (ref 0–0.9)
IMM GRANULOCYTES NFR BLD AUTO: 1.6 % — HIGH (ref 0–0.9)
LYMPHOCYTES # BLD AUTO: 1.18 K/UL — SIGNIFICANT CHANGE UP (ref 1–3.3)
LYMPHOCYTES # BLD AUTO: 1.18 K/UL — SIGNIFICANT CHANGE UP (ref 1–3.3)
LYMPHOCYTES # BLD AUTO: 1.53 K/UL — SIGNIFICANT CHANGE UP (ref 1–3.3)
LYMPHOCYTES # BLD AUTO: 1.53 K/UL — SIGNIFICANT CHANGE UP (ref 1–3.3)
LYMPHOCYTES # BLD AUTO: 46.3 % — HIGH (ref 13–44)
LYMPHOCYTES # BLD AUTO: 46.3 % — HIGH (ref 13–44)
LYMPHOCYTES # BLD AUTO: 55 % — HIGH (ref 13–44)
LYMPHOCYTES # BLD AUTO: 55 % — HIGH (ref 13–44)
MACROCYTES BLD QL: SIGNIFICANT CHANGE UP
MACROCYTES BLD QL: SIGNIFICANT CHANGE UP
MCHC RBC-ENTMCNC: 32.9 G/DL — SIGNIFICANT CHANGE UP (ref 32–36)
MCHC RBC-ENTMCNC: 32.9 G/DL — SIGNIFICANT CHANGE UP (ref 32–36)
MCHC RBC-ENTMCNC: 34.6 G/DL — SIGNIFICANT CHANGE UP (ref 32–36)
MCHC RBC-ENTMCNC: 34.6 G/DL — SIGNIFICANT CHANGE UP (ref 32–36)
MCHC RBC-ENTMCNC: 39.7 PG — HIGH (ref 27–34)
MCHC RBC-ENTMCNC: 39.7 PG — HIGH (ref 27–34)
MCHC RBC-ENTMCNC: 40.5 PG — HIGH (ref 27–34)
MCHC RBC-ENTMCNC: 40.5 PG — HIGH (ref 27–34)
MCV RBC AUTO: 117 FL — HIGH (ref 80–100)
MCV RBC AUTO: 117 FL — HIGH (ref 80–100)
MCV RBC AUTO: 120.6 FL — HIGH (ref 80–100)
MCV RBC AUTO: 120.6 FL — HIGH (ref 80–100)
MONOCYTES # BLD AUTO: 0.16 K/UL — SIGNIFICANT CHANGE UP (ref 0–0.9)
MONOCYTES # BLD AUTO: 0.16 K/UL — SIGNIFICANT CHANGE UP (ref 0–0.9)
MONOCYTES # BLD AUTO: 0.2 K/UL — SIGNIFICANT CHANGE UP (ref 0–0.9)
MONOCYTES # BLD AUTO: 0.2 K/UL — SIGNIFICANT CHANGE UP (ref 0–0.9)
MONOCYTES NFR BLD AUTO: 6.3 % — SIGNIFICANT CHANGE UP (ref 2–14)
MONOCYTES NFR BLD AUTO: 6.3 % — SIGNIFICANT CHANGE UP (ref 2–14)
MONOCYTES NFR BLD AUTO: 7 % — SIGNIFICANT CHANGE UP (ref 2–14)
MONOCYTES NFR BLD AUTO: 7 % — SIGNIFICANT CHANGE UP (ref 2–14)
NEUTROPHILS # BLD AUTO: 0.98 K/UL — LOW (ref 1.8–7.4)
NEUTROPHILS # BLD AUTO: 0.98 K/UL — LOW (ref 1.8–7.4)
NEUTROPHILS # BLD AUTO: 1.06 K/UL — LOW (ref 1.8–7.4)
NEUTROPHILS # BLD AUTO: 1.06 K/UL — LOW (ref 1.8–7.4)
NEUTROPHILS NFR BLD AUTO: 35 % — LOW (ref 43–77)
NEUTROPHILS NFR BLD AUTO: 35 % — LOW (ref 43–77)
NEUTROPHILS NFR BLD AUTO: 41.4 % — LOW (ref 43–77)
NEUTROPHILS NFR BLD AUTO: 41.4 % — LOW (ref 43–77)
NRBC # BLD: 1 /100 WBCS — HIGH (ref 0–0)
NRBC # BLD: 1 /100 WBCS — HIGH (ref 0–0)
NRBC # BLD: SIGNIFICANT CHANGE UP /100 WBCS (ref 0–0)
NRBC # BLD: SIGNIFICANT CHANGE UP /100 WBCS (ref 0–0)
PLAT MORPH BLD: NORMAL — SIGNIFICANT CHANGE UP
PLAT MORPH BLD: NORMAL — SIGNIFICANT CHANGE UP
PLATELET # BLD AUTO: 208 K/UL — SIGNIFICANT CHANGE UP (ref 150–400)
PLATELET # BLD AUTO: 208 K/UL — SIGNIFICANT CHANGE UP (ref 150–400)
PLATELET # BLD AUTO: 223 K/UL — SIGNIFICANT CHANGE UP (ref 150–400)
PLATELET # BLD AUTO: 223 K/UL — SIGNIFICANT CHANGE UP (ref 150–400)
POIKILOCYTOSIS BLD QL AUTO: SIGNIFICANT CHANGE UP
POIKILOCYTOSIS BLD QL AUTO: SIGNIFICANT CHANGE UP
POLYCHROMASIA BLD QL SMEAR: SLIGHT — SIGNIFICANT CHANGE UP
POLYCHROMASIA BLD QL SMEAR: SLIGHT — SIGNIFICANT CHANGE UP
RBC # BLD: 2.47 M/UL — LOW (ref 4.2–5.8)
RBC # BLD: 2.47 M/UL — LOW (ref 4.2–5.8)
RBC # BLD: 2.52 M/UL — LOW (ref 4.2–5.8)
RBC # BLD: 2.52 M/UL — LOW (ref 4.2–5.8)
RBC # FLD: 21.9 % — HIGH (ref 10.3–14.5)
RBC # FLD: 21.9 % — HIGH (ref 10.3–14.5)
RBC # FLD: 22.3 % — HIGH (ref 10.3–14.5)
RBC # FLD: 22.3 % — HIGH (ref 10.3–14.5)
RBC BLD AUTO: ABNORMAL
RBC BLD AUTO: ABNORMAL
SCHISTOCYTES BLD QL AUTO: SLIGHT — SIGNIFICANT CHANGE UP
SCHISTOCYTES BLD QL AUTO: SLIGHT — SIGNIFICANT CHANGE UP
WBC # BLD: 2.54 K/UL — LOW (ref 3.8–10.5)
WBC # BLD: 2.54 K/UL — LOW (ref 3.8–10.5)
WBC # BLD: 2.79 K/UL — LOW (ref 3.8–10.5)
WBC # BLD: 2.79 K/UL — LOW (ref 3.8–10.5)
WBC # FLD AUTO: 2.54 K/UL — LOW (ref 3.8–10.5)
WBC # FLD AUTO: 2.54 K/UL — LOW (ref 3.8–10.5)
WBC # FLD AUTO: 2.79 K/UL — LOW (ref 3.8–10.5)
WBC # FLD AUTO: 2.79 K/UL — LOW (ref 3.8–10.5)

## 2024-01-09 ENCOUNTER — APPOINTMENT (OUTPATIENT)
Dept: INFUSION THERAPY | Facility: HOSPITAL | Age: 84
End: 2024-01-09

## 2024-01-09 LAB
ALBUMIN SERPL ELPH-MCNC: 4.5 G/DL — SIGNIFICANT CHANGE UP (ref 3.3–5)
ALBUMIN SERPL ELPH-MCNC: 4.5 G/DL — SIGNIFICANT CHANGE UP (ref 3.3–5)
ALP SERPL-CCNC: 103 U/L — SIGNIFICANT CHANGE UP (ref 40–120)
ALP SERPL-CCNC: 103 U/L — SIGNIFICANT CHANGE UP (ref 40–120)
ALT FLD-CCNC: 9 U/L — LOW (ref 10–45)
ALT FLD-CCNC: 9 U/L — LOW (ref 10–45)
ANION GAP SERPL CALC-SCNC: 18 MMOL/L — HIGH (ref 5–17)
ANION GAP SERPL CALC-SCNC: 18 MMOL/L — HIGH (ref 5–17)
AST SERPL-CCNC: 19 U/L — SIGNIFICANT CHANGE UP (ref 10–40)
AST SERPL-CCNC: 19 U/L — SIGNIFICANT CHANGE UP (ref 10–40)
BILIRUB SERPL-MCNC: 0.4 MG/DL — SIGNIFICANT CHANGE UP (ref 0.2–1.2)
BILIRUB SERPL-MCNC: 0.4 MG/DL — SIGNIFICANT CHANGE UP (ref 0.2–1.2)
BUN SERPL-MCNC: 25 MG/DL — HIGH (ref 7–23)
BUN SERPL-MCNC: 25 MG/DL — HIGH (ref 7–23)
CALCIUM SERPL-MCNC: 8.9 MG/DL — SIGNIFICANT CHANGE UP (ref 8.4–10.5)
CALCIUM SERPL-MCNC: 8.9 MG/DL — SIGNIFICANT CHANGE UP (ref 8.4–10.5)
CHLORIDE SERPL-SCNC: 101 MMOL/L — SIGNIFICANT CHANGE UP (ref 96–108)
CHLORIDE SERPL-SCNC: 101 MMOL/L — SIGNIFICANT CHANGE UP (ref 96–108)
CO2 SERPL-SCNC: 20 MMOL/L — LOW (ref 22–31)
CO2 SERPL-SCNC: 20 MMOL/L — LOW (ref 22–31)
CREAT SERPL-MCNC: 1.78 MG/DL — HIGH (ref 0.5–1.3)
CREAT SERPL-MCNC: 1.78 MG/DL — HIGH (ref 0.5–1.3)
EGFR: 38 ML/MIN/1.73M2 — LOW
EGFR: 38 ML/MIN/1.73M2 — LOW
GLUCOSE SERPL-MCNC: 88 MG/DL — SIGNIFICANT CHANGE UP (ref 70–99)
GLUCOSE SERPL-MCNC: 88 MG/DL — SIGNIFICANT CHANGE UP (ref 70–99)
POTASSIUM SERPL-MCNC: 4.2 MMOL/L — SIGNIFICANT CHANGE UP (ref 3.5–5.3)
POTASSIUM SERPL-MCNC: 4.2 MMOL/L — SIGNIFICANT CHANGE UP (ref 3.5–5.3)
POTASSIUM SERPL-SCNC: 4.2 MMOL/L — SIGNIFICANT CHANGE UP (ref 3.5–5.3)
POTASSIUM SERPL-SCNC: 4.2 MMOL/L — SIGNIFICANT CHANGE UP (ref 3.5–5.3)
PROT SERPL-MCNC: 7.1 G/DL — SIGNIFICANT CHANGE UP (ref 6–8.3)
PROT SERPL-MCNC: 7.1 G/DL — SIGNIFICANT CHANGE UP (ref 6–8.3)
SODIUM SERPL-SCNC: 139 MMOL/L — SIGNIFICANT CHANGE UP (ref 135–145)
SODIUM SERPL-SCNC: 139 MMOL/L — SIGNIFICANT CHANGE UP (ref 135–145)

## 2024-01-10 ENCOUNTER — APPOINTMENT (OUTPATIENT)
Dept: INFUSION THERAPY | Facility: HOSPITAL | Age: 84
End: 2024-01-10

## 2024-01-11 ENCOUNTER — APPOINTMENT (OUTPATIENT)
Dept: INFUSION THERAPY | Facility: HOSPITAL | Age: 84
End: 2024-01-11

## 2024-01-11 ENCOUNTER — APPOINTMENT (OUTPATIENT)
Dept: INTERNAL MEDICINE | Facility: CLINIC | Age: 84
End: 2024-01-11

## 2024-01-12 ENCOUNTER — RESULT REVIEW (OUTPATIENT)
Age: 84
End: 2024-01-12

## 2024-01-12 ENCOUNTER — APPOINTMENT (OUTPATIENT)
Dept: INFUSION THERAPY | Facility: HOSPITAL | Age: 84
End: 2024-01-12

## 2024-01-13 LAB
ALBUMIN SERPL ELPH-MCNC: 4.2 G/DL — SIGNIFICANT CHANGE UP (ref 3.3–5)
ALBUMIN SERPL ELPH-MCNC: 4.2 G/DL — SIGNIFICANT CHANGE UP (ref 3.3–5)
ALP SERPL-CCNC: 94 U/L — SIGNIFICANT CHANGE UP (ref 40–120)
ALP SERPL-CCNC: 94 U/L — SIGNIFICANT CHANGE UP (ref 40–120)
ALT FLD-CCNC: 10 U/L — SIGNIFICANT CHANGE UP (ref 10–45)
ALT FLD-CCNC: 10 U/L — SIGNIFICANT CHANGE UP (ref 10–45)
ANION GAP SERPL CALC-SCNC: 17 MMOL/L — SIGNIFICANT CHANGE UP (ref 5–17)
ANION GAP SERPL CALC-SCNC: 17 MMOL/L — SIGNIFICANT CHANGE UP (ref 5–17)
AST SERPL-CCNC: 17 U/L — SIGNIFICANT CHANGE UP (ref 10–40)
AST SERPL-CCNC: 17 U/L — SIGNIFICANT CHANGE UP (ref 10–40)
BILIRUB SERPL-MCNC: 0.4 MG/DL — SIGNIFICANT CHANGE UP (ref 0.2–1.2)
BILIRUB SERPL-MCNC: 0.4 MG/DL — SIGNIFICANT CHANGE UP (ref 0.2–1.2)
BUN SERPL-MCNC: 22 MG/DL — SIGNIFICANT CHANGE UP (ref 7–23)
BUN SERPL-MCNC: 22 MG/DL — SIGNIFICANT CHANGE UP (ref 7–23)
CALCIUM SERPL-MCNC: 8.6 MG/DL — SIGNIFICANT CHANGE UP (ref 8.4–10.5)
CALCIUM SERPL-MCNC: 8.6 MG/DL — SIGNIFICANT CHANGE UP (ref 8.4–10.5)
CHLORIDE SERPL-SCNC: 101 MMOL/L — SIGNIFICANT CHANGE UP (ref 96–108)
CHLORIDE SERPL-SCNC: 101 MMOL/L — SIGNIFICANT CHANGE UP (ref 96–108)
CO2 SERPL-SCNC: 22 MMOL/L — SIGNIFICANT CHANGE UP (ref 22–31)
CO2 SERPL-SCNC: 22 MMOL/L — SIGNIFICANT CHANGE UP (ref 22–31)
CREAT SERPL-MCNC: 1.81 MG/DL — HIGH (ref 0.5–1.3)
CREAT SERPL-MCNC: 1.81 MG/DL — HIGH (ref 0.5–1.3)
EGFR: 37 ML/MIN/1.73M2 — LOW
EGFR: 37 ML/MIN/1.73M2 — LOW
GLUCOSE SERPL-MCNC: 161 MG/DL — HIGH (ref 70–99)
GLUCOSE SERPL-MCNC: 161 MG/DL — HIGH (ref 70–99)
HCT VFR BLD CALC: 29.9 % — LOW (ref 39–50)
HCT VFR BLD CALC: 29.9 % — LOW (ref 39–50)
HGB BLD-MCNC: 9.8 G/DL — LOW (ref 13–17)
HGB BLD-MCNC: 9.8 G/DL — LOW (ref 13–17)
MCHC RBC-ENTMCNC: 32.8 GM/DL — SIGNIFICANT CHANGE UP (ref 32–36)
MCHC RBC-ENTMCNC: 32.8 GM/DL — SIGNIFICANT CHANGE UP (ref 32–36)
MCHC RBC-ENTMCNC: 39.8 PG — HIGH (ref 27–34)
MCHC RBC-ENTMCNC: 39.8 PG — HIGH (ref 27–34)
MCV RBC AUTO: 121.5 FL — HIGH (ref 80–100)
MCV RBC AUTO: 121.5 FL — HIGH (ref 80–100)
PLATELET # BLD AUTO: 200 K/UL — SIGNIFICANT CHANGE UP (ref 150–400)
PLATELET # BLD AUTO: 200 K/UL — SIGNIFICANT CHANGE UP (ref 150–400)
POTASSIUM SERPL-MCNC: 4 MMOL/L — SIGNIFICANT CHANGE UP (ref 3.5–5.3)
POTASSIUM SERPL-MCNC: 4 MMOL/L — SIGNIFICANT CHANGE UP (ref 3.5–5.3)
POTASSIUM SERPL-SCNC: 4 MMOL/L — SIGNIFICANT CHANGE UP (ref 3.5–5.3)
POTASSIUM SERPL-SCNC: 4 MMOL/L — SIGNIFICANT CHANGE UP (ref 3.5–5.3)
PROT SERPL-MCNC: 6.7 G/DL — SIGNIFICANT CHANGE UP (ref 6–8.3)
PROT SERPL-MCNC: 6.7 G/DL — SIGNIFICANT CHANGE UP (ref 6–8.3)
RBC # BLD: 2.46 M/UL — LOW (ref 4.2–5.8)
RBC # BLD: 2.46 M/UL — LOW (ref 4.2–5.8)
RBC # FLD: 20.9 % — HIGH (ref 10.3–14.5)
RBC # FLD: 20.9 % — HIGH (ref 10.3–14.5)
SODIUM SERPL-SCNC: 140 MMOL/L — SIGNIFICANT CHANGE UP (ref 135–145)
SODIUM SERPL-SCNC: 140 MMOL/L — SIGNIFICANT CHANGE UP (ref 135–145)
WBC # BLD: 2.45 K/UL — LOW (ref 3.8–10.5)
WBC # BLD: 2.45 K/UL — LOW (ref 3.8–10.5)
WBC # FLD AUTO: 2.45 K/UL — LOW (ref 3.8–10.5)
WBC # FLD AUTO: 2.45 K/UL — LOW (ref 3.8–10.5)

## 2024-01-15 NOTE — DIETITIAN INITIAL EVALUATION ADULT. - FACTORS AFF FOOD INTAKE
"Scheduled date of colonoscopy (as of today):03/04/2024  Physician performing colonoscopy: YOSI  Location of colonoscopy: [facility]  Bowel prep reviewed with patient:  MIRALAX/DULCOLAX  Instructions reviewed with patient by: Nadira via telephone. Procedure directions sent via [employer].  Clearances: n/a      HT: 5'3\"  WT: 140  BMI: 24.8  "
ASC Screening    ASC Screening  BMI > than 45: No  Are you currently pregnant?: No  Do you rely on a wheelchair for mobility?: No  Do you need oxygen during the day?: No  Have you ever been informed by anesthesia that you have a difficult airway?: No  Have you been diagnosed with End Stage Renal Disease (ESRD)?: No  Are you actively on dialysis?: No  Have you been diagnosed with Pulmonary Hypertension?: No  Do you have a pacemaker or an Automatic Implantable Cardioverter Defibrillator (AICD)?: No  Have you ever had an organ transplant?: No  Have you had a stroke, heart attack, myocardial infarction (MI) within the last 6 months?: No  Have you ever been diagnosed with Aortic Stenosis?: No  Have you ever been diagnosed  with Congestive Heart Failure?: No  Have you ever been diagnosed with a heart valve disease?: No  Are you Diabetic?: No  If you are Diabetic, has your A1C been greater than 12 within the last six months?: N/A       
none

## 2024-01-16 ENCOUNTER — OUTPATIENT (OUTPATIENT)
Dept: OUTPATIENT SERVICES | Facility: HOSPITAL | Age: 84
LOS: 1 days | End: 2024-01-16

## 2024-01-16 ENCOUNTER — APPOINTMENT (OUTPATIENT)
Dept: INTERNAL MEDICINE | Facility: CLINIC | Age: 84
End: 2024-01-16

## 2024-01-16 DIAGNOSIS — D64.9 ANEMIA, UNSPECIFIED: ICD-10-CM

## 2024-01-16 DIAGNOSIS — Z98.890 OTHER SPECIFIED POSTPROCEDURAL STATES: Chronic | ICD-10-CM

## 2024-01-16 DIAGNOSIS — Z86.79 PERSONAL HISTORY OF OTHER DISEASES OF THE CIRCULATORY SYSTEM: Chronic | ICD-10-CM

## 2024-01-16 DIAGNOSIS — Z98.49 CATARACT EXTRACTION STATUS, UNSPECIFIED EYE: Chronic | ICD-10-CM

## 2024-01-16 DIAGNOSIS — Z90.79 ACQUIRED ABSENCE OF OTHER GENITAL ORGAN(S): Chronic | ICD-10-CM

## 2024-01-17 ENCOUNTER — RESULT REVIEW (OUTPATIENT)
Age: 84
End: 2024-01-17

## 2024-01-17 ENCOUNTER — APPOINTMENT (OUTPATIENT)
Dept: HEMATOLOGY ONCOLOGY | Facility: CLINIC | Age: 84
End: 2024-01-17

## 2024-01-17 ENCOUNTER — APPOINTMENT (OUTPATIENT)
Dept: INFUSION THERAPY | Facility: HOSPITAL | Age: 84
End: 2024-01-17

## 2024-01-17 LAB
BASOPHILS # BLD AUTO: 0.05 K/UL — SIGNIFICANT CHANGE UP (ref 0–0.2)
BASOPHILS NFR BLD AUTO: 1.4 % — SIGNIFICANT CHANGE UP (ref 0–2)
EOSINOPHIL # BLD AUTO: 0.13 K/UL — SIGNIFICANT CHANGE UP (ref 0–0.5)
EOSINOPHIL NFR BLD AUTO: 3.7 % — SIGNIFICANT CHANGE UP (ref 0–6)
HCT VFR BLD CALC: 29.1 % — LOW (ref 39–50)
HGB BLD-MCNC: 10.3 G/DL — LOW (ref 13–17)
IMM GRANULOCYTES NFR BLD AUTO: 2.6 % — HIGH (ref 0–0.9)
LYMPHOCYTES # BLD AUTO: 1.18 K/UL — SIGNIFICANT CHANGE UP (ref 1–3.3)
LYMPHOCYTES # BLD AUTO: 33.6 % — SIGNIFICANT CHANGE UP (ref 13–44)
MCHC RBC-ENTMCNC: 35.4 G/DL — SIGNIFICANT CHANGE UP (ref 32–36)
MCHC RBC-ENTMCNC: 40.6 PG — HIGH (ref 27–34)
MCV RBC AUTO: 114.6 FL — HIGH (ref 80–100)
MONOCYTES # BLD AUTO: 0.13 K/UL — SIGNIFICANT CHANGE UP (ref 0–0.9)
MONOCYTES NFR BLD AUTO: 3.7 % — SIGNIFICANT CHANGE UP (ref 2–14)
NEUTROPHILS # BLD AUTO: 1.93 K/UL — SIGNIFICANT CHANGE UP (ref 1.8–7.4)
NEUTROPHILS NFR BLD AUTO: 55 % — SIGNIFICANT CHANGE UP (ref 43–77)
NRBC # BLD: 0 /100 WBCS — SIGNIFICANT CHANGE UP (ref 0–0)
PLATELET # BLD AUTO: 147 K/UL — LOW (ref 150–400)
RBC # BLD: 2.54 M/UL — LOW (ref 4.2–5.8)
RBC # FLD: 19.3 % — HIGH (ref 10.3–14.5)
WBC # BLD: 3.51 K/UL — LOW (ref 3.8–10.5)
WBC # FLD AUTO: 3.51 K/UL — LOW (ref 3.8–10.5)

## 2024-01-19 ENCOUNTER — OUTPATIENT (OUTPATIENT)
Dept: OUTPATIENT SERVICES | Facility: HOSPITAL | Age: 84
LOS: 1 days | Discharge: HOME | End: 2024-01-19
Payer: MEDICARE

## 2024-01-19 ENCOUNTER — APPOINTMENT (OUTPATIENT)
Dept: HEMATOLOGY ONCOLOGY | Facility: CLINIC | Age: 84
End: 2024-01-19

## 2024-01-19 ENCOUNTER — APPOINTMENT (OUTPATIENT)
Dept: INFUSION THERAPY | Facility: HOSPITAL | Age: 84
End: 2024-01-19

## 2024-01-19 DIAGNOSIS — Z98.49 CATARACT EXTRACTION STATUS, UNSPECIFIED EYE: Chronic | ICD-10-CM

## 2024-01-19 DIAGNOSIS — Z98.890 OTHER SPECIFIED POSTPROCEDURAL STATES: Chronic | ICD-10-CM

## 2024-01-19 DIAGNOSIS — Z86.79 PERSONAL HISTORY OF OTHER DISEASES OF THE CIRCULATORY SYSTEM: Chronic | ICD-10-CM

## 2024-01-19 DIAGNOSIS — D46.9 MYELODYSPLASTIC SYNDROME, UNSPECIFIED: ICD-10-CM

## 2024-01-19 DIAGNOSIS — D64.9 ANEMIA, UNSPECIFIED: ICD-10-CM

## 2024-01-19 DIAGNOSIS — Z90.79 ACQUIRED ABSENCE OF OTHER GENITAL ORGAN(S): Chronic | ICD-10-CM

## 2024-01-22 ENCOUNTER — APPOINTMENT (OUTPATIENT)
Dept: INFUSION THERAPY | Facility: HOSPITAL | Age: 84
End: 2024-01-22

## 2024-01-22 ENCOUNTER — RESULT REVIEW (OUTPATIENT)
Age: 84
End: 2024-01-22

## 2024-01-22 ENCOUNTER — APPOINTMENT (OUTPATIENT)
Dept: HEMATOLOGY ONCOLOGY | Facility: CLINIC | Age: 84
End: 2024-01-22

## 2024-01-22 LAB
BASOPHILS # BLD AUTO: 0.04 K/UL — SIGNIFICANT CHANGE UP (ref 0–0.2)
BASOPHILS NFR BLD AUTO: 1.1 % — SIGNIFICANT CHANGE UP (ref 0–2)
EOSINOPHIL # BLD AUTO: 0.15 K/UL — SIGNIFICANT CHANGE UP (ref 0–0.5)
EOSINOPHIL NFR BLD AUTO: 4.1 % — SIGNIFICANT CHANGE UP (ref 0–6)
HCT VFR BLD CALC: 29.2 % — LOW (ref 39–50)
HGB BLD-MCNC: 10.2 G/DL — LOW (ref 13–17)
IMM GRANULOCYTES NFR BLD AUTO: 2.7 % — HIGH (ref 0–0.9)
LYMPHOCYTES # BLD AUTO: 1.24 K/UL — SIGNIFICANT CHANGE UP (ref 1–3.3)
LYMPHOCYTES # BLD AUTO: 34.1 % — SIGNIFICANT CHANGE UP (ref 13–44)
MCHC RBC-ENTMCNC: 34.9 G/DL — SIGNIFICANT CHANGE UP (ref 32–36)
MCHC RBC-ENTMCNC: 39.8 PG — HIGH (ref 27–34)
MCV RBC AUTO: 114.1 FL — HIGH (ref 80–100)
MONOCYTES # BLD AUTO: 0.21 K/UL — SIGNIFICANT CHANGE UP (ref 0–0.9)
MONOCYTES NFR BLD AUTO: 5.8 % — SIGNIFICANT CHANGE UP (ref 2–14)
NEUTROPHILS # BLD AUTO: 1.9 K/UL — SIGNIFICANT CHANGE UP (ref 1.8–7.4)
NEUTROPHILS NFR BLD AUTO: 52.2 % — SIGNIFICANT CHANGE UP (ref 43–77)
NRBC # BLD: 0 /100 WBCS — SIGNIFICANT CHANGE UP (ref 0–0)
PLATELET # BLD AUTO: 71 K/UL — LOW (ref 150–400)
RBC # BLD: 2.56 M/UL — LOW (ref 4.2–5.8)
RBC # FLD: 19.1 % — HIGH (ref 10.3–14.5)
WBC # BLD: 3.64 K/UL — LOW (ref 3.8–10.5)
WBC # FLD AUTO: 3.64 K/UL — LOW (ref 3.8–10.5)

## 2024-01-24 ENCOUNTER — APPOINTMENT (OUTPATIENT)
Dept: INFUSION THERAPY | Facility: HOSPITAL | Age: 84
End: 2024-01-24

## 2024-01-24 ENCOUNTER — APPOINTMENT (OUTPATIENT)
Dept: HEMATOLOGY ONCOLOGY | Facility: CLINIC | Age: 84
End: 2024-01-24

## 2024-01-26 ENCOUNTER — RESULT REVIEW (OUTPATIENT)
Age: 84
End: 2024-01-26

## 2024-01-26 ENCOUNTER — APPOINTMENT (OUTPATIENT)
Dept: HEMATOLOGY ONCOLOGY | Facility: CLINIC | Age: 84
End: 2024-01-26

## 2024-01-26 ENCOUNTER — APPOINTMENT (OUTPATIENT)
Dept: INFUSION THERAPY | Facility: HOSPITAL | Age: 84
End: 2024-01-26

## 2024-01-26 LAB
ANISOCYTOSIS BLD QL: SIGNIFICANT CHANGE UP
BASOPHILS # BLD AUTO: 0.02 K/UL — SIGNIFICANT CHANGE UP (ref 0–0.2)
BASOPHILS NFR BLD AUTO: 0.7 % — SIGNIFICANT CHANGE UP (ref 0–2)
DACRYOCYTES BLD QL SMEAR: SLIGHT — SIGNIFICANT CHANGE UP
ELLIPTOCYTES BLD QL SMEAR: SLIGHT — SIGNIFICANT CHANGE UP
EOSINOPHIL # BLD AUTO: 0.1 K/UL — SIGNIFICANT CHANGE UP (ref 0–0.5)
EOSINOPHIL NFR BLD AUTO: 3.5 % — SIGNIFICANT CHANGE UP (ref 0–6)
HCT VFR BLD CALC: 28.5 % — LOW (ref 39–50)
HGB BLD-MCNC: 9.9 G/DL — LOW (ref 13–17)
IMM GRANULOCYTES NFR BLD AUTO: 1.4 % — HIGH (ref 0–0.9)
LYMPHOCYTES # BLD AUTO: 1.39 K/UL — SIGNIFICANT CHANGE UP (ref 1–3.3)
LYMPHOCYTES # BLD AUTO: 48.3 % — HIGH (ref 13–44)
MACROCYTES BLD QL: SIGNIFICANT CHANGE UP
MCHC RBC-ENTMCNC: 34.7 G/DL — SIGNIFICANT CHANGE UP (ref 32–36)
MCHC RBC-ENTMCNC: 39.9 PG — HIGH (ref 27–34)
MCV RBC AUTO: 114.9 FL — HIGH (ref 80–100)
MONOCYTES # BLD AUTO: 0.21 K/UL — SIGNIFICANT CHANGE UP (ref 0–0.9)
MONOCYTES NFR BLD AUTO: 7.3 % — SIGNIFICANT CHANGE UP (ref 2–14)
NEUTROPHILS # BLD AUTO: 1.12 K/UL — LOW (ref 1.8–7.4)
NEUTROPHILS NFR BLD AUTO: 38.8 % — LOW (ref 43–77)
NRBC # BLD: 0 /100 WBCS — SIGNIFICANT CHANGE UP (ref 0–0)
PLAT MORPH BLD: NORMAL — SIGNIFICANT CHANGE UP
PLATELET # BLD AUTO: 77 K/UL — LOW (ref 150–400)
POIKILOCYTOSIS BLD QL AUTO: SIGNIFICANT CHANGE UP
POLYCHROMASIA BLD QL SMEAR: SLIGHT — SIGNIFICANT CHANGE UP
RBC # BLD: 2.48 M/UL — LOW (ref 4.2–5.8)
RBC # FLD: 19.6 % — HIGH (ref 10.3–14.5)
RBC BLD AUTO: ABNORMAL
SCHISTOCYTES BLD QL AUTO: SLIGHT — SIGNIFICANT CHANGE UP
WBC # BLD: 2.88 K/UL — LOW (ref 3.8–10.5)
WBC # FLD AUTO: 2.88 K/UL — LOW (ref 3.8–10.5)

## 2024-01-29 ENCOUNTER — RESULT REVIEW (OUTPATIENT)
Age: 84
End: 2024-01-29

## 2024-01-29 ENCOUNTER — APPOINTMENT (OUTPATIENT)
Dept: HEMATOLOGY ONCOLOGY | Facility: CLINIC | Age: 84
End: 2024-01-29

## 2024-01-29 ENCOUNTER — APPOINTMENT (OUTPATIENT)
Dept: INFUSION THERAPY | Facility: HOSPITAL | Age: 84
End: 2024-01-29

## 2024-01-29 LAB
ANISOCYTOSIS BLD QL: SIGNIFICANT CHANGE UP
BASOPHILS # BLD AUTO: 0.03 K/UL — SIGNIFICANT CHANGE UP (ref 0–0.2)
BASOPHILS NFR BLD AUTO: 1.6 % — SIGNIFICANT CHANGE UP (ref 0–2)
DACRYOCYTES BLD QL SMEAR: SLIGHT — SIGNIFICANT CHANGE UP
ELLIPTOCYTES BLD QL SMEAR: SIGNIFICANT CHANGE UP
EOSINOPHIL # BLD AUTO: 0.09 K/UL — SIGNIFICANT CHANGE UP (ref 0–0.5)
EOSINOPHIL NFR BLD AUTO: 4.7 % — SIGNIFICANT CHANGE UP (ref 0–6)
HCT VFR BLD CALC: 30.1 % — LOW (ref 39–50)
HGB BLD-MCNC: 10.3 G/DL — LOW (ref 13–17)
IMM GRANULOCYTES NFR BLD AUTO: 1.6 % — HIGH (ref 0–0.9)
LYMPHOCYTES # BLD AUTO: 1.1 K/UL — SIGNIFICANT CHANGE UP (ref 1–3.3)
LYMPHOCYTES # BLD AUTO: 57 % — HIGH (ref 13–44)
MACROCYTES BLD QL: SIGNIFICANT CHANGE UP
MCHC RBC-ENTMCNC: 34.2 G/DL — SIGNIFICANT CHANGE UP (ref 32–36)
MCHC RBC-ENTMCNC: 40.1 PG — HIGH (ref 27–34)
MCV RBC AUTO: 117.1 FL — HIGH (ref 80–100)
MONOCYTES # BLD AUTO: 0.1 K/UL — SIGNIFICANT CHANGE UP (ref 0–0.9)
MONOCYTES NFR BLD AUTO: 5.2 % — SIGNIFICANT CHANGE UP (ref 2–14)
NEUTROPHILS # BLD AUTO: 0.58 K/UL — LOW (ref 1.8–7.4)
NEUTROPHILS NFR BLD AUTO: 29.9 % — LOW (ref 43–77)
NRBC # BLD: 0 /100 WBCS — SIGNIFICANT CHANGE UP (ref 0–0)
PLAT MORPH BLD: NORMAL — SIGNIFICANT CHANGE UP
PLATELET # BLD AUTO: 111 K/UL — LOW (ref 150–400)
POIKILOCYTOSIS BLD QL AUTO: SIGNIFICANT CHANGE UP
POLYCHROMASIA BLD QL SMEAR: SLIGHT — SIGNIFICANT CHANGE UP
RBC # BLD: 2.57 M/UL — LOW (ref 4.2–5.8)
RBC # FLD: 20 % — HIGH (ref 10.3–14.5)
RBC BLD AUTO: ABNORMAL
SCHISTOCYTES BLD QL AUTO: SLIGHT — SIGNIFICANT CHANGE UP
WBC # BLD: 1.93 K/UL — LOW (ref 3.8–10.5)
WBC # FLD AUTO: 1.93 K/UL — LOW (ref 3.8–10.5)

## 2024-01-30 ENCOUNTER — NON-APPOINTMENT (OUTPATIENT)
Age: 84
End: 2024-01-30

## 2024-01-31 ENCOUNTER — APPOINTMENT (OUTPATIENT)
Dept: HEMATOLOGY ONCOLOGY | Facility: CLINIC | Age: 84
End: 2024-01-31

## 2024-01-31 ENCOUNTER — APPOINTMENT (OUTPATIENT)
Dept: INFUSION THERAPY | Facility: HOSPITAL | Age: 84
End: 2024-01-31

## 2024-02-02 ENCOUNTER — LABORATORY RESULT (OUTPATIENT)
Age: 84
End: 2024-02-02

## 2024-02-02 ENCOUNTER — RESULT REVIEW (OUTPATIENT)
Age: 84
End: 2024-02-02

## 2024-02-02 ENCOUNTER — APPOINTMENT (OUTPATIENT)
Dept: INFUSION THERAPY | Facility: HOSPITAL | Age: 84
End: 2024-02-02

## 2024-02-02 ENCOUNTER — APPOINTMENT (OUTPATIENT)
Dept: HEMATOLOGY ONCOLOGY | Facility: CLINIC | Age: 84
End: 2024-02-02

## 2024-02-02 ENCOUNTER — APPOINTMENT (OUTPATIENT)
Dept: HEMATOLOGY ONCOLOGY | Facility: CLINIC | Age: 84
End: 2024-02-02
Payer: MEDICARE

## 2024-02-02 VITALS
BODY MASS INDEX: 27.64 KG/M2 | DIASTOLIC BLOOD PRESSURE: 83 MMHG | SYSTOLIC BLOOD PRESSURE: 131 MMHG | WEIGHT: 171.98 LBS | HEART RATE: 62 BPM | TEMPERATURE: 97.7 F | OXYGEN SATURATION: 99 % | RESPIRATION RATE: 16 BRPM | HEIGHT: 66 IN

## 2024-02-02 LAB
ANISOCYTOSIS BLD QL: SLIGHT — SIGNIFICANT CHANGE UP
BASOPHILS # BLD AUTO: 0.02 K/UL — SIGNIFICANT CHANGE UP (ref 0–0.2)
BASOPHILS NFR BLD AUTO: 1 % — SIGNIFICANT CHANGE UP (ref 0–2)
DACRYOCYTES BLD QL SMEAR: SLIGHT — SIGNIFICANT CHANGE UP
ELLIPTOCYTES BLD QL SMEAR: SIGNIFICANT CHANGE UP
EOSINOPHIL # BLD AUTO: 0.02 K/UL — SIGNIFICANT CHANGE UP (ref 0–0.5)
EOSINOPHIL NFR BLD AUTO: 1 % — SIGNIFICANT CHANGE UP (ref 0–6)
HCT VFR BLD CALC: 29.6 % — LOW (ref 39–50)
HGB BLD-MCNC: 10.1 G/DL — LOW (ref 13–17)
LYMPHOCYTES # BLD AUTO: 1.26 K/UL — SIGNIFICANT CHANGE UP (ref 1–3.3)
LYMPHOCYTES # BLD AUTO: 51 % — HIGH (ref 13–44)
MACROCYTES BLD QL: SIGNIFICANT CHANGE UP
MCHC RBC-ENTMCNC: 34.1 G/DL — SIGNIFICANT CHANGE UP (ref 32–36)
MCHC RBC-ENTMCNC: 39.6 PG — HIGH (ref 27–34)
MCV RBC AUTO: 116.1 FL — HIGH (ref 80–100)
MONOCYTES # BLD AUTO: 0.02 K/UL — SIGNIFICANT CHANGE UP (ref 0–0.9)
MONOCYTES NFR BLD AUTO: 1 % — LOW (ref 2–14)
NEUTROPHILS # BLD AUTO: 1.14 K/UL — LOW (ref 1.8–7.4)
NEUTROPHILS NFR BLD AUTO: 46 % — SIGNIFICANT CHANGE UP (ref 43–77)
NRBC # BLD: 0 /100 WBCS — SIGNIFICANT CHANGE UP (ref 0–0)
NRBC # BLD: SIGNIFICANT CHANGE UP /100 WBCS (ref 0–0)
PLAT MORPH BLD: NORMAL — SIGNIFICANT CHANGE UP
PLATELET # BLD AUTO: 169 K/UL — SIGNIFICANT CHANGE UP (ref 150–400)
POIKILOCYTOSIS BLD QL AUTO: SIGNIFICANT CHANGE UP
POLYCHROMASIA BLD QL SMEAR: SLIGHT — SIGNIFICANT CHANGE UP
RBC # BLD: 2.55 M/UL — LOW (ref 4.2–5.8)
RBC # FLD: 19.1 % — HIGH (ref 10.3–14.5)
RBC BLD AUTO: ABNORMAL
SCHISTOCYTES BLD QL AUTO: SLIGHT — SIGNIFICANT CHANGE UP
WBC # BLD: 2.48 K/UL — LOW (ref 3.8–10.5)
WBC # FLD AUTO: 2.48 K/UL — LOW (ref 3.8–10.5)

## 2024-02-02 PROCEDURE — 38222 DX BONE MARROW BX & ASPIR: CPT | Mod: RT

## 2024-02-02 RX ORDER — CYCLOBENZAPRINE HYDROCHLORIDE 5 MG/1
5 TABLET, FILM COATED ORAL 3 TIMES DAILY
Qty: 21 | Refills: 1 | Status: COMPLETED | COMMUNITY
Start: 2023-05-23 | End: 2024-02-02

## 2024-02-02 RX ORDER — AMOXICILLIN AND CLAVULANATE POTASSIUM 875; 125 MG/1; MG/1
875-125 TABLET, COATED ORAL TWICE DAILY
Qty: 14 | Refills: 0 | Status: COMPLETED | COMMUNITY
Start: 2023-11-13 | End: 2024-02-02

## 2024-02-02 RX ORDER — SULFAMETHOXAZOLE AND TRIMETHOPRIM 800; 160 MG/1; MG/1
800-160 TABLET ORAL DAILY
Qty: 7 | Refills: 0 | Status: COMPLETED | COMMUNITY
Start: 2023-11-16 | End: 2024-02-02

## 2024-02-02 NOTE — PROCEDURE
[Bone Marrow Biopsy] : bone marrow biopsy [Bone Marrow Aspiration] : bone marrow aspiration  [Patient] : the patient [Verbal Consent Obtained] : verbal consent was obtained prior to the procedure [Patient identification verified] : patient identification verified [Procedure verified and consent obtained] : procedure verified and consent obtained [Laterality verified and correct site marked] : laterality verified and correct site marked [Right] : site: right [Correct positioning] : correct positioning [Prone] : prone [Superior iliac spine was identified] : the superior iliac spine was identified. [The right posterior iliac crest was prepped with betadine and draped, using sterile technique.] : The right posterior iliac crest was prepped with betadine and draped, using sterile technique. [Lidocaine was injected and into the periosteum overlying the site.] : Lidocaine was injected and into the periosteum overlying the site. [Aspirate] : aspirate [Cytogenetics] : cytogenetics [FISH] : FISH [Biopsy] : biopsy [Flow Cytometry] : flow cytometry [] : The patient was instructed to remove the bandage the following AM. The patient may bathe. Acetaminophen may be taken for discomfort, as per package directions.If there are any other problems, the patient was instructed to call the office. The patient verbalized understanding, and is aware of the office contact numbers. 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 Assess response to tx [FreeTextEntry2] : 7 cc of 1% Lidocaine was used for the procedure   WBC: 2.48K/ul Hgb: 10.1g/dL Hct: 29.6% Plts: 169K/uL   Bone marrow aspiration and biopsy were done. MDS, onkosight myeloid panel requested. Pressure applied > 10 mins - no S&S of bleeding noted.

## 2024-02-02 NOTE — REASON FOR VISIT
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 Assess response to tx

## 2024-02-05 ENCOUNTER — TRANSCRIPTION ENCOUNTER (OUTPATIENT)
Age: 84
End: 2024-02-05

## 2024-02-07 ENCOUNTER — APPOINTMENT (OUTPATIENT)
Dept: HEMATOLOGY ONCOLOGY | Facility: CLINIC | Age: 84
End: 2024-02-07

## 2024-02-09 ENCOUNTER — APPOINTMENT (OUTPATIENT)
Dept: CV DIAGNOSITCS | Facility: HOSPITAL | Age: 84
End: 2024-02-09

## 2024-02-09 ENCOUNTER — RESULT REVIEW (OUTPATIENT)
Age: 84
End: 2024-02-09

## 2024-02-09 ENCOUNTER — OUTPATIENT (OUTPATIENT)
Dept: OUTPATIENT SERVICES | Facility: HOSPITAL | Age: 84
LOS: 1 days | End: 2024-02-09
Payer: MEDICARE

## 2024-02-09 DIAGNOSIS — I50.20 UNSPECIFIED SYSTOLIC (CONGESTIVE) HEART FAILURE: ICD-10-CM

## 2024-02-09 DIAGNOSIS — Z98.49 CATARACT EXTRACTION STATUS, UNSPECIFIED EYE: Chronic | ICD-10-CM

## 2024-02-09 DIAGNOSIS — Z90.79 ACQUIRED ABSENCE OF OTHER GENITAL ORGAN(S): Chronic | ICD-10-CM

## 2024-02-09 DIAGNOSIS — Z98.890 OTHER SPECIFIED POSTPROCEDURAL STATES: Chronic | ICD-10-CM

## 2024-02-09 DIAGNOSIS — Z86.79 PERSONAL HISTORY OF OTHER DISEASES OF THE CIRCULATORY SYSTEM: Chronic | ICD-10-CM

## 2024-02-09 PROCEDURE — 93306 TTE W/DOPPLER COMPLETE: CPT | Mod: 26

## 2024-02-09 PROCEDURE — 93306 TTE W/DOPPLER COMPLETE: CPT

## 2024-02-12 ENCOUNTER — RESULT REVIEW (OUTPATIENT)
Age: 84
End: 2024-02-12

## 2024-02-12 ENCOUNTER — APPOINTMENT (OUTPATIENT)
Dept: HEMATOLOGY ONCOLOGY | Facility: CLINIC | Age: 84
End: 2024-02-12
Payer: MEDICARE

## 2024-02-12 ENCOUNTER — APPOINTMENT (OUTPATIENT)
Dept: INFUSION THERAPY | Facility: HOSPITAL | Age: 84
End: 2024-02-12

## 2024-02-12 ENCOUNTER — APPOINTMENT (OUTPATIENT)
Dept: HEMATOLOGY ONCOLOGY | Facility: CLINIC | Age: 84
End: 2024-02-12

## 2024-02-12 VITALS
SYSTOLIC BLOOD PRESSURE: 122 MMHG | DIASTOLIC BLOOD PRESSURE: 70 MMHG | RESPIRATION RATE: 16 BRPM | HEART RATE: 57 BPM | BODY MASS INDEX: 28.89 KG/M2 | TEMPERATURE: 97.6 F | WEIGHT: 178.99 LBS | OXYGEN SATURATION: 98 %

## 2024-02-12 LAB
ALBUMIN SERPL ELPH-MCNC: 4.1 G/DL — SIGNIFICANT CHANGE UP (ref 3.3–5)
ALP SERPL-CCNC: 99 U/L — SIGNIFICANT CHANGE UP (ref 40–120)
ALT FLD-CCNC: <5 U/L — LOW (ref 10–45)
ANION GAP SERPL CALC-SCNC: 13 MMOL/L — SIGNIFICANT CHANGE UP (ref 5–17)
AST SERPL-CCNC: 16 U/L — SIGNIFICANT CHANGE UP (ref 10–40)
BASOPHILS # BLD AUTO: 0.09 K/UL — SIGNIFICANT CHANGE UP (ref 0–0.2)
BASOPHILS NFR BLD AUTO: 2.8 % — HIGH (ref 0–2)
BILIRUB SERPL-MCNC: 0.3 MG/DL — SIGNIFICANT CHANGE UP (ref 0.2–1.2)
BUN SERPL-MCNC: 33 MG/DL — HIGH (ref 7–23)
CALCIUM SERPL-MCNC: 8.8 MG/DL — SIGNIFICANT CHANGE UP (ref 8.4–10.5)
CHLORIDE SERPL-SCNC: 103 MMOL/L — SIGNIFICANT CHANGE UP (ref 96–108)
CO2 SERPL-SCNC: 24 MMOL/L — SIGNIFICANT CHANGE UP (ref 22–31)
CREAT SERPL-MCNC: 1.97 MG/DL — HIGH (ref 0.5–1.3)
EGFR: 33 ML/MIN/1.73M2 — LOW
EOSINOPHIL # BLD AUTO: 0.12 K/UL — SIGNIFICANT CHANGE UP (ref 0–0.5)
EOSINOPHIL NFR BLD AUTO: 3.8 % — SIGNIFICANT CHANGE UP (ref 0–6)
GLUCOSE SERPL-MCNC: 82 MG/DL — SIGNIFICANT CHANGE UP (ref 70–99)
HCT VFR BLD CALC: 33.8 % — LOW (ref 39–50)
HGB BLD-MCNC: 11.3 G/DL — LOW (ref 13–17)
IMM GRANULOCYTES NFR BLD AUTO: 1.6 % — HIGH (ref 0–0.9)
LDH SERPL L TO P-CCNC: 209 U/L — SIGNIFICANT CHANGE UP (ref 50–242)
LYMPHOCYTES # BLD AUTO: 1.32 K/UL — SIGNIFICANT CHANGE UP (ref 1–3.3)
LYMPHOCYTES # BLD AUTO: 41.4 % — SIGNIFICANT CHANGE UP (ref 13–44)
MCHC RBC-ENTMCNC: 33.4 G/DL — SIGNIFICANT CHANGE UP (ref 32–36)
MCHC RBC-ENTMCNC: 39.2 PG — HIGH (ref 27–34)
MCV RBC AUTO: 117.4 FL — HIGH (ref 80–100)
MONOCYTES # BLD AUTO: 0.34 K/UL — SIGNIFICANT CHANGE UP (ref 0–0.9)
MONOCYTES NFR BLD AUTO: 10.7 % — SIGNIFICANT CHANGE UP (ref 2–14)
NEUTROPHILS # BLD AUTO: 1.27 K/UL — LOW (ref 1.8–7.4)
NEUTROPHILS NFR BLD AUTO: 39.7 % — LOW (ref 43–77)
NRBC # BLD: 0 /100 WBCS — SIGNIFICANT CHANGE UP (ref 0–0)
PLATELET # BLD AUTO: 216 K/UL — SIGNIFICANT CHANGE UP (ref 150–400)
POTASSIUM SERPL-MCNC: 4 MMOL/L — SIGNIFICANT CHANGE UP (ref 3.5–5.3)
POTASSIUM SERPL-SCNC: 4 MMOL/L — SIGNIFICANT CHANGE UP (ref 3.5–5.3)
PROT SERPL-MCNC: 6.9 G/DL — SIGNIFICANT CHANGE UP (ref 6–8.3)
RBC # BLD: 2.88 M/UL — LOW (ref 4.2–5.8)
RBC # FLD: 18.3 % — HIGH (ref 10.3–14.5)
SODIUM SERPL-SCNC: 140 MMOL/L — SIGNIFICANT CHANGE UP (ref 135–145)
WBC # BLD: 3.19 K/UL — LOW (ref 3.8–10.5)
WBC # FLD AUTO: 3.19 K/UL — LOW (ref 3.8–10.5)

## 2024-02-12 PROCEDURE — 99214 OFFICE O/P EST MOD 30 MIN: CPT

## 2024-02-12 NOTE — PHYSICAL EXAM
[Ambulatory and capable of all self care but unable to carry out any work activities] : Status 2- Ambulatory and capable of all self care but unable to carry out any work activities. Up and about more than 50% of waking hours [Normal] : affect appropriate [de-identified] : seen in wheelchair [de-identified] : afib rate controlled  [de-identified] : unsteady gait, 2 ppl assist

## 2024-02-12 NOTE — RESULTS/DATA
[FreeTextEntry1] : 2/12/2024  HGB 11.3   WBC 3.19  BONE MARROW  	 Patient:     MASOOD MACARIO   Accession:                             [id number]  Collected Date/Time:                   2/2/2024 11:08 EST Received Date/Time:                    2/3/2024 11:15 EST  Hematopathology Report - Auth (Verified)  Specimen(s) Submitted 1. Bone marrow biopsy MH 2. Bone marrow aspirate for Flow OP  Final Diagnosis 1, 2. Bone marrow biopsy and bone marrow aspirate      - Cellular bone marrow with   erythroid predominant  trilineage  hematopoiesis, reduced  myelopoiesis and  adequate  megakaryopoiesis See note and description.  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  Current biopsy shows cellular bone marrow with    erythroid predominant  trilineage  hematopoiesis, reduced  myelopoiesis and adequate  megakaryopoiesis  with few small  hypolobated forms. Aspirate smears show  dysplastic  features in erythroid  elements  including nuclear irregularities, budding and   megloblastoid  changes.  Suggest correlation with pending   cytogenetics , FISH and myeloid  NGS panel. Comprehensive report with results of pending ancillary studies to follow.  Ancillary studies Bone marrow aspirate iron stain:   Iron stores are increased; ring  sideroblasts are not increased. Flow  cytometry ([id number]):     -  The lymphocyte   immunophenotypic findings show no diagnostic abnormalities.     -  Myeloblasts (0.65% of cells), positive for   myeloperoxidase , HLA-DR, CD34, , CD33, CD13, partial dim CD7; negative for CD15, CD16, CD64, CD2, CD4.      - The  myeloid immunophenotypic  findings show normal  myeloid granularity, no increase in   myeloid immaturity, and normal  myeloid antigen maturation pattern, and the presence of    hematogones (which are reported to be low in   myelodysplasia). Immunohistochemical  stains:  Immunostains CD34, ,  Myeloperoxidase , CD15, CD71, E- cadherin , CD61, Factor VIII, p53 are performed on block 1A. CD34 stains less than 1% cells.  stains increased scattered mast cells.  Myeloperoxidase , CD15 highlight  myeloid elements. CD71 stains many clusters of   erythroid elements and show  erythroid predominance. E- cadherin  stains few pronormoblasts . CD61, factor VIII stains  megakaryocytes  and highlight small  hypolobated forms. p53 (dim) stains few scattered cells. Cytogenetics : Pending FISH:  Pending  Microscopic description: 1. Biopsy:  Sections of bone marrow biopsy and bone marrow fragments in clot show variable  cellularity  (5% -50% cellularity ) in a fragmented and hemorrhagic core biopsy,   erythroid predominant  trilineage hematopoiesis with maturation, reduced   myelopoiesis. M:E ratio is reduced.  Megakaryocytes  are normal in number with occasional small  hypolobated forms. Iron stores are increased.  2. Aspirate:  Spicular  and cellular; adequate for interpretation. Maturing and mature  myeloid and  erythroid elements are present.  Erythroid  elements are markedly increased.   Erythroid elements show progressive maturation with  dysplastic features including nuclear irregularities, budding and  megaloblastoid changes. M:E ratio (0.3:1) is reduced.    Megakaryocytes appear normal in number and occasional small forms are present. Many scattered mast cells are present.  	 Flow Cytometry Final Report ________________________________________________________________________ Specimen: Bone marrow aspirate Date Collected: 02/02/2024 Date Received: 02/02/2024 Date Processed: 02/02/2024 Date Reported: 02/07/2024 16:45 Accession #: [id number] ________________________________________________________________________ CLINICAL DATA: Clinical history of myelodysplastic syndrome, rule out acute myeloid leukemia  ________________________________________________________________________ CD45/Side Scatter Differential Granulocytes: 45 % ;    Lymphocytes: 29 % ;    Monocytes: 4 % ;    Dim CD45 and/or blast gate: 5 % ;    Erythroid/debris: 15 % ________________________________________________________________________ DIAGNOSIS: Bone marrow aspirate:     -  The lymphocyte immunophenotypic findings show no diagnostic abnormalities.     - Myeloblasts (0.65% of cells), positive for myeloperoxidase, HLA-DR, CD34, , CD33, CD13, partial dim CD7; negative for CD15, CD16, CD64, CD2, CD4.      - The myeloid immunophenotypic findings show normal myeloid granularity, no increase in myeloid immaturity, and normal myeloid antigen maturation pattern, and the presence of hematogones (which are reported to be low in myelodysplasia). Please see interpretation.  INTERPRETATION: MORPHOLOGY: Maturing trilineage hematopoiesis CYTOSPIN: Maturing and mature myeloid elements with no significant lymphocytosis or immaturity; pronormoblasts present.  IMMUNOPHENOTYPE: Lymphocytes (29% of cells): Heterogeneous population of T-cells (with normal CD4 to CD8 ratio, including CD57+ natural killer-like T-cells, gamma/delta T-cells), natural killer cells, and polytypic B-cells. The lymphocyte immunophenotypic findings show no diagnostic abnormalities.  Myeloblasts (0.65% of cells), positive for myeloperoxidase, HLA-DR, CD34, , CD33, CD13, partial dim, CD7; negative for CD15, CD16, CD64, CD2, CD4.  CD45/side scatter shows 0.65% myeloblast population and normal myeloid granularity. There is no increase in CD34, CD14/CD64 or  positive cells. Myeloid antigen pattern is normal with CD13, CD16, CD11b, CD15, and CD33. Granulocytes express CD56.  Hematogones are present. The myeloid immunophenotypic findings show normal myeloid granularity, no increase in myeloid immaturity, and normal myeloid antigen maturation pattern, and the presence of hematogones (which are reported to be low in myelodysplasia). _____________________________________________________________________  PATHOLOGY: MOLECULAR   [employer] Advanced NGS Myeloid Panel Final Report  RESULT SUMMARY: ABNORMAL  DETECTED GENOMIC ALTERATIONS:   Tier I: Variants of Strong Clinical Significance   SF3B1 p.Hep887Oxm   Tier II: Variants of Potential Clinical Significance   DNMT3A p.?   Tier III: Variants of Unknown Clinical Significance   KIT p.Hcl222Anf   12/7/2023 HGB 10.4 WBC 3.47    9/18/2023  (Before and after stopping Revlimid)  WBC 1.14 > 6.08 Hgb 7.3  > 8.8  > 210  8/9/2023 Most recent blood work from 8/9/2023 WBC 1.14  Hgb 7.3     7/2/2023 Hgb 9.9, .4 WBC platelet normal  Cr 2.3 eGFR 28  Hypokalemic on 6/28 3.3  5/22/2023 WBC and platelet WNL Hgb 11.1  .9 CMP from 5/4-  Cr 1.79, eGFR 38 Pending CMP LDH UA today  3/24/2023 WBC 6.28 Hgb 10.2 .7 Platelet 224 Last Cr (2/24/23) - 2.42 Pending repeat CBC, CMP, LDH, UA  2/24/2023 WBC 2.62 Hgb 8.8 Platelet 128 Pending CMP, LDH, UA  1/26/2023 Last Hgb 9.1, Cr 2.3 (12/16/2022)    12/16/2022 HGB on 10/31/2022; 8.3,  Bone marrow biopsy:  Patient:   MASOOD MACARIO   Accession:                             [id number]  Collected Date/Time:                   10/31/2022 16:49 EDT Received Date/Time:                    10/31/2022 16:50 EDT  Hematopathology Addendum Report - Auth (Verified)  Hematopathology Addendum Additional immunohistochemical stains (block 1A: p53, ) show  increased  positive interstitial mast cells without aggregates. TP53  shows less than 1% bright positive cells.  There is no change in the diagnosis.  Verified by: Brooklynn Erazo (Electronic Signature) Reported on: 11/17/22 09:[facility], [address] [address] Phone: [phone]   Fax: [phone] _________________________________________________________________  Disclaimer Slide(s) with built in immunohistochemical study control(s) and negative  control associated with this case has/have been verified by the sign out  pathologist.  For slide(s) without built in controls positive control slides has/have  been reviewed and approved by immunohistochemistry lab  These immunohistochemical/ in-situ hybridization tests have been developed  and their performance characteristics determined by [facility], Department of Pathology, Division of Immunopathology,  [facility] [place] [address].  It has not been cleared  or approved by the U.S. Food and Drug Administration.  The FDA has  determined that such clearance or approval is not necessary.  This test  is used for clinical purposes.  The laboratory is certified under the  CLIA-88 as qualified to perform high complexity clinical testing. Hematopathology Addendum Report - Auth (Verified)  Hematopathology Addendum       Comprehensive Hematopathology Report  Final Diagnosis : 1, 2. 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 The bone marrow  shows hypercellularity with erythroid hyperplasia and increased ring  sideroblasts and dysplastic megakaryocytosis. Interstitial mast cells  are increased with normal morphology, few CD25 positive, negative CD30.  The findings show myelodysplastic syndrome with multilineage dysplasia  (hypolobulated megakaryocytes) and ring sideroblasts. Correlation with  cytogenetics, FISH, and somatic mutation analysis to evaluate for complex  karyotype, del(5q), -7, del(7q) SF3B1, TP53, other abnormalities is  done. Please note findings of an   abnormal male karyotype, 46,XY,del(5)(q15q33) [13]/46,XY[7],  a positive MDS FISH panel and [employer] Advanced NGS  Myeloid Report showing   Tier I: Variants of Strong Clinical Significance:  SF3B1 p.Szb231Art (VAF: 39%),   Tier II: Variants of Potential Clinical   Significance: DNMT3A p.?  (VAF: 4%), Tier III: Variants of Unknown  Clinical Significance:   KIT p.Wuf174Ayq  (VAF: 50%) . Based on the  additional findings, the MDS classification (ICC) is MDS with del(5q) and  with WHO is MDS with low blasts and 5q deletion.  Dr. Mcmahon was notified of the diagnosis on 11/07/2022.  Morphology: Microscopic description: 1. Biopsy:   Sections of bone marrow biopsy and bone marrow fragments in  clot show hypercellularity (50-85% cellularity), erythroid predominance  with maturation and increased pronormoblasts, maturing and mature  myeloid elements, megakaryocytes at least normal in number with abnormal  morphology (increased hypolobulated/small forms), increased interstitial  mast cells without aggregates, and iron stores increased.  2. Aspirate:   Cellular spicules are present, adequate for interpretation.   Maturing and mature myeloid and erythroid elements are present with  erythroid predominance (M:E ratio (1.16:1).  Megakaryocytes appear at  least normal in number with small/hypolobulated morphology. Mast cells  are increased in the spicules (round and normally granular).  Bone Marrow Aspirate Differential: (100 Cells). Type  % Normal* Blast  0% 0-3 Neutrophil and    Precursors   47% 33-63 Eosinophil  3% 1-5 Basophil  0% 0-1 Pronormoblast  5% 0-2 Normoblast  43% 15-25 Monocyte  0% 0-2 Lymphocyte  7% 10-15 Plasma cell  0% 0-1   *Adult Range  Comment Iron stain (examined to evaluate for iron stores; see microscopic  description) and Giemsa stain (shows appropriate staining pattern) are  performed and evaluated on block(s): 1A, 1B  Ancillary Studies: Bone marrow aspirate iron stain:   Iron stores are increased; numerous ring  sideroblasts are present (greater than 15%).  Flow cytometry:   The myeloid immunophenotypic findings show decreased  myeloid granularity, no increase in myeloid immaturity (myeloblasts,  0.32% of cells, with normal immunophenotype), normal myeloid antigen  maturation pattern, few mast cells, and the presence of hematogones  (which are reported to be low in myelodysplasia). The lymphocyte immunophenotypic findings show no diagnostic abnormalities.  Immunohistochemical stains   (block 1A: CD34, , myeloperoxidase,  CD71, E-cadherin, factor VIII, CD15, CD61, CD25, CD30, p53) show no  increase in CD34 positive cells (less than 3%), erythroid predominance  (positive with CD71), with clusters of pronormoblasts (positive with  E-cadherin); diminished myeloid elements with maturation (positive   with myeloperoxidase and CD15), and increased megakaryocyte number with  dysplastic, small/hypolobulated morphology (positive with factor VIII,  CD61). CD30 is negative in mast cells; a few show dim CD25 positivity.  Cytogenetics:  [id number] Date Collected:  10/31/2022 Result:  Abnormal male karyotype Karyotype: 46,XY,del(5)(q15q33)[13]/46,XY[7]  Fluorescence in situ Hybridization (FISH):    [id number] Result: ABNORMAL FISH MDS PANEL - 5q deletion detected (65%) Probe(s) and Location(s):   Z2O629/ D5S23 (5p15.2), EGR1 (5q31), D7Z1  (7p11.1-q11.1), K3X637 (7q31), CEP-8/D8Z2   ? (8p11.1-q11.1), F27J640  (20q12) ISCN Nomenclature:  nuc clif(B8A910/I8F44d2,EGR1x1)[130/200],  (D7Z1,U6D770)x2[200], (D8Z2,Q79L818)x2[197/200], (TP53x2)[197/200]  Molecular Analyses: [employer] Advanced NGS Myeloid Report Specimen ID:  [id number] Date Collected:  10/31/2022 Specimen Source:  Bone Marrow  RESULT SUMMARY:  ABNORMAL DETECTED GENOMIC ALTERATIONS: Tier I: Variants of Strong Clinical Significance SF3B1 p.Rqz529Vhe Tier II: Variants of Potential Clinical Significance DNMT3A p.? Tier III: Variants of Unknown Clinical Significance KIT p.Ghk825Lma  PERTINENT NEGATIVE RESULTS: The following genes are NEGATIVE for clinically relevant mutations.  Mutational hotspots and surrounding exonic regions were interrogated for  DNA level point mutations and indels (fusions not assayed). ABL1, ANKRD26, ASXL1, ATRX, BCOR, BCORL1, BRAF, CALR, CBL, CCND2, CDKN2A,  CEBPA, CSF3R, CUX1, DDX41, ETNK1, ETV6, EZH2, FBXW7, FLT3, GATA2, HRAS,  IDH1, IDH2, JAK2, KDM6A, KMT2A, KRAS, MAP2K1, MPL, MYD88, NF1, NPM1,  NRAS, PDGFRA, PHF6, PTEN, PTPN11, RUNX1, SETBP1, SRSF2, STAG2, TET2,  TP53, U2AF1, WT1, ZRSR2 TECHNICAL SUMMARY : DNMT3A p.? c.2174-1G>A 4% allele frequency Exon 19 [id number]  SF3B1 p.Jnf329Pbz c.1998G>C 39% allele frequency Exon 14 [id number]_012433.3   KIT p.Jhy471Ptx c.1879C>T 50% allele frequency Exon 12 [id number]_000222.2  Clinical History/Data  : History of macrocytic anemia with CKD rule out primary bone marrow  disorder  CBC, 10/31/2022  Test Code       Result         Reference                  Range WBC             5.75 K/uL      3.80 - 10.50 RBC             2.34 M/uL L    4.20 - 5.80 HGB             8.3 g/dL L     13.0 - 17.0 HCT             25.4 % L       39.0 - 50.0 MCV             108.5 fl H     80.0 - 100.0 MCH             35.5 pg H      27.0 - 34.0 MCHC            32.7 g/dL      32.0 - 36.0 RDW             18.5 % H       10.3 - 14.5 PLT             236 K/uL       150 - 400 Auto NRBC       0 /100 WBCs    0 - 0 NEUT%           63.5 %         43.0 - 77.0  NEUT#           3.65 K/uL      1.80 - 7.40 LYMPH%          20.5 %         13.0 - 44.0 LYMPH#          1.18 K/uL      1.00 - 3.30 MONO%           7.0 %          2.0 - 14.0 MONO#           0.40 K/uL      0.00 - 0.90 EOS%            3.3 %          0.0 - 6.0 EOS#            0.19 K/uL      0.00 - 0.50 BASO%           1.4 %          0.0 - 2.0 BASO#           0.08 K/uL      0.00 - 0.20 BUN             28 mg/dL H     7 - 23 Creatinine      2.32 mg/dL H   0.50 - 1.30  Verified by: Brooklynn Erazo (Electronic Signature) Reported on: 11/09/22 16:40 [facility], [facility], [address] [place] [address]. [place] [address], [place] [address] Phone: [phone]   Fax: [phone]  10/27/2022 (Labs on 10/18/2022): Hgb stable at 9.0, normal WBC, platelets Creatinine 2.32 eGFR 28   5/12/2022 Available labs reviewed.  Macrocytic anemia, no etiology could be determined.

## 2024-02-12 NOTE — ASSESSMENT
[FreeTextEntry1] : 81-year-old Mr. MACARIO is seen in follow up for macrocytic anemia (initial evaluation: HGB ~9.5, MCV ~125), WBC and platelet counts are preserved. Patient now has some symptoms of anemia. 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 He was started on Revlimid in December but was unable to tolerate due to multiple side effects. Recent hospitalization for new onset afib + anemia. Course c/b acute gout flare, neutropenic fever, transaminitis, and JUAN RAMON on CKD. Since his discharge from the hospital, he has had two vagal episodes with afib RVR which is now being controlled by 25mg Metoprolol BID. Clinically, he is responding well to the Procrit injections with his Hgb ~10 and appears with more energy and strength. Restarted Revlimid 5mg daily, tolerating well. Weekly CBC and CMP to monitor his blood counts as well as liver/kidney function. Continue Procrit injections weekly to maintain Hgb > 11. 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) - Discussed and reviewed bone marrow findings with the patient - Started on Revlimid 5 mg daily (recommended dose 10 mg) and held in January 2023 due to cytopenias req hospitalization - Started 20,000U Procrit injections since February with great response (Hgb 10+)  - Restarted Revlimid 5 mg daily (June 2023), tolerating well but cytopenia worsened - Increase Procrit to 40,000 units QW (8/10/2023); hold for Hgb > 11 - Held Revlimid as of today (8/10/2023) for worsening pancytopenia - Cytopenia including HGB improved - Discussed and planned to start HMA (S/Q) - Patient consented (9/18/2023)  - C1D1 on 10/1/2023, C2 D1 on 10/30/2023 - Admitted to hospital for sepsis, Cycle 3 delayed - Last transfusion was in 11/2023 - Today (2/12/2024): Hgb 11.3, Plt 216, WBC 3.19, ANC 1.27 - Post C4 bone marrow: Discussed; response noted; Tri-lineage hematopoiesis, no increase in blasts  - He is cleared for the C5 treatment  - Transfusion support per protocol  - C/w trending CBC, CMP, LDH weekly home draws (order in)  - RTC in 1 month before C6 treatment   [] Afib, rate controlled - New onset afib - Likely multifactorial etiology of dehydration and anemia - [id number] score = 5 - On 6.25mg of Carvedilol - Cardiology held ASA and Plavix due to anemia - Evaluated by cards and started on Metoprolol - Now afib rate controlled  [] Gout - C/w Allopurinol and Colchicine - Right toe without S&S of infection - Continue Allopurinol - Continue f/u with rheum  [] Back pain - Last MRI in 2021 with lumbar spondylosis, with spinal canal narrowing - Left flank pain may be r/t the stenosis causing pinched nerve pains - Currently on Gabapentin 200mg daily - Increased to Gabapentin 300mg TID - Repeat MRI with no contrast (6/20) - chronic lumbar spondylosis - Recommended spine specialist - pt deferred at this time and will f/u w/ rheum  [] Hypokalemia - K of 3.3 on 6/28 - Rx sent for 20meq klor x2 to be taken every other day reevaluate next week - Caution with potassium replenishment as pt has CKD.

## 2024-02-12 NOTE — HISTORY OF PRESENT ILLNESS
[de-identified] : CHART REVIEW: 81-year-old Mr. MACARIO is seen in consult for macrocytic anemia (Hgb 9.5, MCV ~125). Patient has multiple medical conditions including stage-3 CKD. He has no symptoms of anemia and has no complaints.  [de-identified] : 05/12/22: 81 year-old Mr. MACARIO is seen in consult for macrocytic  anemia (Hgb 9.5, MCV ~125). Patient has multiple medical conditions including stage-3 CKD. He has no symptoms of anemia and has no complaints.   10/27/22: Patient presenting today for follow up for macrocytic anemia. Overall feels well. Endorses some fatigue and shortness of breath with activity. Denies headache, dizziness, Ambulates with walker. He is being followed by his nephrologist who manages his hypertension. Endorses taking diuretics every other day.    12/16/2022 Patient returns for a follow up. He endorses no change in his health status. 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 Patient has some symptoms of fatigue and tiredness.   1/26/2023 Patient returns for a follow up. He started taking Revlimid on 12/30/2022. About 2 weeks later he started developing side effects like-- loss  of appetite, body ache, constipation, pins and needle sensation. He also appears to have developed Jaundice.   2/24/2023 Patient seen in follow up, accompanied by Preeti ceja, at bedside. He had a recent hospitalization from 1/27 - 2/7 for new onset afib that was detected in the treatment room prior to his blood transfusion appointment. He was found to be in afib, anemic (Hgb 6.3) requiring 4 PRBCs throughout his stay. His course was c/b acute gout flare, neutropenic fever, transaminitis, and JUAN RAMON on CKD. 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 Had an episode of neutropenic fever started on Cefepime but stopped as per ID, most likely due to gout flare. UA + but asymptomatic, not treated. Bld cx neg x2. Syncopal episode while being on the commode yesterday 2/24. As per Preeti, EKG revealed rate controlled afib. Today, his HR was 127 in office. Denies dizziness, SOB, chest pain, palpitations. As per pt, was on a holter monitor that was submitted yesterday. He is f/u with Dr. Reddy (cards). He also have been having a dry cough for few days. + chills. No fever, body aches, night sweats. No sick contacts. Working with PT. Still feels weak, ambulating via wheelchair. Appetite is okay. Weight is stable.   3/24/2023 Patient seen in follow up. Accompanied by Preeti ceja at bedside. He has been responding well to 20k U Procrit weekly injections. Last Hgb 10.2, BP stable 111/58. Since his last visit, has been evaluated by cards for his afib (now rate controlled) currently on Metoprolol 25mg BID. He reports feeling much better since last encounter - he is now able to ambulate longer distances with a walker. Endorses unsteady gait (vertigo). Appetite is good, weight is stable.   5/22/2023 Patient seen in follow up. Accompanied by aide and daughter at bedside. Had a recent UTI, treated with Amoxicillin. He'll be following up with his PCP tomorrow. He endorses left flank pain that started after his UTI that is hindering him from ambulating. Describes the pain to be "shocking" and sharp in nature, pain reproduced by movements. His last MRI was in 2021 which revealed lumbar spondylosis with mild to moderate spinal canal narrowing. + peripheral neuropathy of his right lower ext. Currently on Gabapentin 200mg daily. His Hgb has been stable since April 27th. Last Hgb 11.1 last week. Otherwise, no complaints. Appetite is good and weight is stable.  7/5/2023 Patient seen in follow up. With aide and daughter at bedside. Interim, he's been feeling well. He completed first month of starting Revlimid without any side effects. His most recent CBC from last week 9.7, he plans on self administering Procrit today after the visit. He had MRI done 6/20 which showed degenerative vs inflammation of his lumbar spine. He continues to experience back pain and some scapular pain from pulled muscle last week. Recommended ortho / pain management for chronic back pain but pt denied at this time. He reports feeling weaker in his lower extremities but does not want to participate in PT at this time. Otherwise, feeling well. Appetite is good, weight is stable.   8/10/2023 Patient presents for a follow up. He appears pale. He feels weak and sleepy after taking the Lenalidomide pill. He is not doing any PT. He is not able to walk by himself yet. Hs anemia has become worse despite EUGENIA. Lenalidomide may not be helping to improve his disease but worsening the pancytopenia.    9/18/2023 Patient seen in follow up. He was taken off Revlimid in his last visit and his EUGENIA dose was increased to 40KU. His HGB increased to ~9g (from 7) and neutropenia and thrombocytopenia resolved. He looks and feels better. We discussed starting HMA.   12/7/2023 Patient presents for a follow up and evaluation for treatment. He completed 2 cycles of HMA (Dacogen). Before the 3rd cycle, he was admitted to the hospital for low O2Sat. He received IV abx for suspected sepsis. He was discharged a week ago. His physical condition has improved. His last transfusion (PRBC) was 3 weeks ago (11/2023). His Hgb is 10.4, platelets 175, WBC 3.47. He is continuing on Retacrit at home. He is holding his counts without transfusions. It appears that he is responding to the treatment.   2/12/2024

## 2024-02-13 ENCOUNTER — APPOINTMENT (OUTPATIENT)
Dept: INFUSION THERAPY | Facility: HOSPITAL | Age: 84
End: 2024-02-13

## 2024-02-13 DIAGNOSIS — Z51.11 ENCOUNTER FOR ANTINEOPLASTIC CHEMOTHERAPY: ICD-10-CM

## 2024-02-13 DIAGNOSIS — R11.2 NAUSEA WITH VOMITING, UNSPECIFIED: ICD-10-CM

## 2024-02-14 ENCOUNTER — APPOINTMENT (OUTPATIENT)
Dept: INFUSION THERAPY | Facility: HOSPITAL | Age: 84
End: 2024-02-14

## 2024-02-15 ENCOUNTER — APPOINTMENT (OUTPATIENT)
Dept: HEMATOLOGY ONCOLOGY | Facility: CLINIC | Age: 84
End: 2024-02-15

## 2024-02-15 ENCOUNTER — RESULT REVIEW (OUTPATIENT)
Age: 84
End: 2024-02-15

## 2024-02-15 ENCOUNTER — APPOINTMENT (OUTPATIENT)
Dept: INFUSION THERAPY | Facility: HOSPITAL | Age: 84
End: 2024-02-15

## 2024-02-15 LAB
BASOPHILS # BLD AUTO: 0.07 K/UL — SIGNIFICANT CHANGE UP (ref 0–0.2)
BASOPHILS NFR BLD AUTO: 1.5 % — SIGNIFICANT CHANGE UP (ref 0–2)
EOSINOPHIL # BLD AUTO: 0.27 K/UL — SIGNIFICANT CHANGE UP (ref 0–0.5)
EOSINOPHIL NFR BLD AUTO: 6 % — SIGNIFICANT CHANGE UP (ref 0–6)
HCT VFR BLD CALC: 33.4 % — LOW (ref 39–50)
HGB BLD-MCNC: 11.4 G/DL — LOW (ref 13–17)
IMM GRANULOCYTES NFR BLD AUTO: 3.1 % — HIGH (ref 0–0.9)
LYMPHOCYTES # BLD AUTO: 1.42 K/UL — SIGNIFICANT CHANGE UP (ref 1–3.3)
LYMPHOCYTES # BLD AUTO: 31.3 % — SIGNIFICANT CHANGE UP (ref 13–44)
MCHC RBC-ENTMCNC: 34.1 G/DL — SIGNIFICANT CHANGE UP (ref 32–36)
MCHC RBC-ENTMCNC: 39.4 PG — HIGH (ref 27–34)
MCV RBC AUTO: 115.6 FL — HIGH (ref 80–100)
MONOCYTES # BLD AUTO: 0.28 K/UL — SIGNIFICANT CHANGE UP (ref 0–0.9)
MONOCYTES NFR BLD AUTO: 6.2 % — SIGNIFICANT CHANGE UP (ref 2–14)
NEUTROPHILS # BLD AUTO: 2.35 K/UL — SIGNIFICANT CHANGE UP (ref 1.8–7.4)
NEUTROPHILS NFR BLD AUTO: 51.9 % — SIGNIFICANT CHANGE UP (ref 43–77)
NRBC # BLD: 0 /100 WBCS — SIGNIFICANT CHANGE UP (ref 0–0)
PLATELET # BLD AUTO: 181 K/UL — SIGNIFICANT CHANGE UP (ref 150–400)
RBC # BLD: 2.89 M/UL — LOW (ref 4.2–5.8)
RBC # FLD: 18.2 % — HIGH (ref 10.3–14.5)
WBC # BLD: 4.53 K/UL — SIGNIFICANT CHANGE UP (ref 3.8–10.5)
WBC # FLD AUTO: 4.53 K/UL — SIGNIFICANT CHANGE UP (ref 3.8–10.5)

## 2024-02-16 ENCOUNTER — RESULT REVIEW (OUTPATIENT)
Age: 84
End: 2024-02-16

## 2024-02-16 ENCOUNTER — APPOINTMENT (OUTPATIENT)
Dept: INFUSION THERAPY | Facility: HOSPITAL | Age: 84
End: 2024-02-16

## 2024-02-16 LAB
ALBUMIN SERPL ELPH-MCNC: 4.2 G/DL — SIGNIFICANT CHANGE UP (ref 3.3–5)
ALP SERPL-CCNC: 95 U/L — SIGNIFICANT CHANGE UP (ref 40–120)
ALT FLD-CCNC: <5 U/L — LOW (ref 10–45)
ANION GAP SERPL CALC-SCNC: 13 MMOL/L — SIGNIFICANT CHANGE UP (ref 5–17)
AST SERPL-CCNC: 18 U/L — SIGNIFICANT CHANGE UP (ref 10–40)
BASOPHILS # BLD AUTO: 0.07 K/UL — SIGNIFICANT CHANGE UP (ref 0–0.2)
BASOPHILS NFR BLD AUTO: 1.5 % — SIGNIFICANT CHANGE UP (ref 0–2)
BILIRUB SERPL-MCNC: 0.6 MG/DL — SIGNIFICANT CHANGE UP (ref 0.2–1.2)
BUN SERPL-MCNC: 30 MG/DL — HIGH (ref 7–23)
CALCIUM SERPL-MCNC: 8.8 MG/DL — SIGNIFICANT CHANGE UP (ref 8.4–10.5)
CHLORIDE SERPL-SCNC: 103 MMOL/L — SIGNIFICANT CHANGE UP (ref 96–108)
CO2 SERPL-SCNC: 26 MMOL/L — SIGNIFICANT CHANGE UP (ref 22–31)
CREAT SERPL-MCNC: 1.85 MG/DL — HIGH (ref 0.5–1.3)
EGFR: 36 ML/MIN/1.73M2 — LOW
EOSINOPHIL # BLD AUTO: 0.28 K/UL — SIGNIFICANT CHANGE UP (ref 0–0.5)
EOSINOPHIL NFR BLD AUTO: 6.1 % — HIGH (ref 0–6)
GLUCOSE SERPL-MCNC: 88 MG/DL — SIGNIFICANT CHANGE UP (ref 70–99)
HCT VFR BLD CALC: 34 % — LOW (ref 39–50)
HGB BLD-MCNC: 11.1 G/DL — LOW (ref 13–17)
IMM GRANULOCYTES NFR BLD AUTO: 2.4 % — HIGH (ref 0–0.9)
LYMPHOCYTES # BLD AUTO: 1.3 K/UL — SIGNIFICANT CHANGE UP (ref 1–3.3)
LYMPHOCYTES # BLD AUTO: 28.4 % — SIGNIFICANT CHANGE UP (ref 13–44)
MCHC RBC-ENTMCNC: 32.6 G/DL — SIGNIFICANT CHANGE UP (ref 32–36)
MCHC RBC-ENTMCNC: 39.1 PG — HIGH (ref 27–34)
MCV RBC AUTO: 119.7 FL — HIGH (ref 80–100)
MONOCYTES # BLD AUTO: 0.32 K/UL — SIGNIFICANT CHANGE UP (ref 0–0.9)
MONOCYTES NFR BLD AUTO: 7 % — SIGNIFICANT CHANGE UP (ref 2–14)
NEUTROPHILS # BLD AUTO: 2.49 K/UL — SIGNIFICANT CHANGE UP (ref 1.8–7.4)
NEUTROPHILS NFR BLD AUTO: 54.6 % — SIGNIFICANT CHANGE UP (ref 43–77)
NRBC # BLD: 0 /100 WBCS — SIGNIFICANT CHANGE UP (ref 0–0)
PLATELET # BLD AUTO: 191 K/UL — SIGNIFICANT CHANGE UP (ref 150–400)
POTASSIUM SERPL-MCNC: 4.3 MMOL/L — SIGNIFICANT CHANGE UP (ref 3.5–5.3)
POTASSIUM SERPL-SCNC: 4.3 MMOL/L — SIGNIFICANT CHANGE UP (ref 3.5–5.3)
PROT SERPL-MCNC: 7 G/DL — SIGNIFICANT CHANGE UP (ref 6–8.3)
RBC # BLD: 2.84 M/UL — LOW (ref 4.2–5.8)
RBC # FLD: 18.1 % — HIGH (ref 10.3–14.5)
SODIUM SERPL-SCNC: 142 MMOL/L — SIGNIFICANT CHANGE UP (ref 135–145)
WBC # BLD: 4.57 K/UL — SIGNIFICANT CHANGE UP (ref 3.8–10.5)
WBC # FLD AUTO: 4.57 K/UL — SIGNIFICANT CHANGE UP (ref 3.8–10.5)

## 2024-02-20 ENCOUNTER — LABORATORY RESULT (OUTPATIENT)
Age: 84
End: 2024-02-20

## 2024-02-20 ENCOUNTER — APPOINTMENT (OUTPATIENT)
Dept: INFUSION THERAPY | Facility: HOSPITAL | Age: 84
End: 2024-02-20

## 2024-02-20 ENCOUNTER — RX RENEWAL (OUTPATIENT)
Age: 84
End: 2024-02-20

## 2024-02-20 ENCOUNTER — APPOINTMENT (OUTPATIENT)
Dept: HEMATOLOGY ONCOLOGY | Facility: CLINIC | Age: 84
End: 2024-02-20

## 2024-02-22 ENCOUNTER — APPOINTMENT (OUTPATIENT)
Dept: INFUSION THERAPY | Facility: HOSPITAL | Age: 84
End: 2024-02-22

## 2024-02-22 ENCOUNTER — APPOINTMENT (OUTPATIENT)
Dept: HEMATOLOGY ONCOLOGY | Facility: CLINIC | Age: 84
End: 2024-02-22

## 2024-02-23 ENCOUNTER — LABORATORY RESULT (OUTPATIENT)
Age: 84
End: 2024-02-23

## 2024-02-26 ENCOUNTER — LABORATORY RESULT (OUTPATIENT)
Age: 84
End: 2024-02-26

## 2024-02-29 ENCOUNTER — LABORATORY RESULT (OUTPATIENT)
Age: 84
End: 2024-02-29

## 2024-03-04 ENCOUNTER — LABORATORY RESULT (OUTPATIENT)
Age: 84
End: 2024-03-04

## 2024-03-05 ENCOUNTER — LABORATORY RESULT (OUTPATIENT)
Age: 84
End: 2024-03-05

## 2024-03-05 ENCOUNTER — APPOINTMENT (OUTPATIENT)
Dept: INTERNAL MEDICINE | Facility: CLINIC | Age: 84
End: 2024-03-05
Payer: MEDICARE

## 2024-03-05 VITALS
HEIGHT: 66 IN | WEIGHT: 178 LBS | BODY MASS INDEX: 28.61 KG/M2 | OXYGEN SATURATION: 99 % | HEART RATE: 101 BPM | DIASTOLIC BLOOD PRESSURE: 75 MMHG | SYSTOLIC BLOOD PRESSURE: 126 MMHG

## 2024-03-05 DIAGNOSIS — I10 ESSENTIAL (PRIMARY) HYPERTENSION: ICD-10-CM

## 2024-03-05 DIAGNOSIS — M48.07 SPINAL STENOSIS, LUMBOSACRAL REGION: ICD-10-CM

## 2024-03-05 DIAGNOSIS — M54.9 DORSALGIA, UNSPECIFIED: ICD-10-CM

## 2024-03-05 DIAGNOSIS — E66.9 OBESITY, UNSPECIFIED: ICD-10-CM

## 2024-03-05 DIAGNOSIS — R26.81 UNSTEADINESS ON FEET: ICD-10-CM

## 2024-03-05 PROCEDURE — 36415 COLL VENOUS BLD VENIPUNCTURE: CPT

## 2024-03-05 PROCEDURE — 99214 OFFICE O/P EST MOD 30 MIN: CPT

## 2024-03-05 NOTE — PHYSICAL EXAM
[Normal Sclera/Conjunctiva] : normal sclera/conjunctiva [Normal Outer Ear/Nose] : the outer ears and nose were normal in appearance [Normal Rate] : normal rate  [Normal S1, S2] : normal S1 and S2 [Normal Supraclavicular Nodes] : no supraclavicular lymphadenopathy [Normal Posterior Cervical Nodes] : no posterior cervical lymphadenopathy [Normal Anterior Cervical Nodes] : no anterior cervical lymphadenopathy [Normal] : no joint swelling and grossly normal strength and tone [de-identified] : Irregular irregular with a 2/6 systolic murmur

## 2024-03-05 NOTE — REVIEW OF SYSTEMS
[Dysuria] : no dysuria [Hesitancy] : no hesitancy [Incontinence] : no incontinence [Nocturia] : nocturia [Hematuria] : no hematuria [Impotence] : no impotency [Frequency] : frequency [Poor Libido] : libido not poor [Joint Stiffness] : no joint stiffness [Joint Pain] : no joint pain [Muscle Pain] : no muscle pain [Muscle Weakness] : no muscle weakness [Back Pain] : back pain [Joint Swelling] : no joint swelling [Negative] : Heme/Lymph

## 2024-03-05 NOTE — HISTORY OF PRESENT ILLNESS
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 deletion on Revlimid developed cytopenia on hold, atrial fibrillation off anticoagulation due to anemia, peripheral vascular disease, mitral and aortic valve disease, coronary artery disease, chronic kidney disease, overweight, gout, venous insufficiency, lumbar sacral stenosis, fatty liver disease, peripheral neuropathy, carotid stenosis right, history of stroke syndrome, urinary frequency, vitamin D deficiency who presents for follow-up patient feels well complains of left-sided back pain with movement nonradiating denies fever, chills, chest pain, palpitations also complains of frequency urination fell out of bed several weeks ago. [FreeTextEntry1] : Follow-up for multiple medical issues and complaint of left-sided back pain and urinary frequency

## 2024-03-05 NOTE — ASSESSMENT
[FreeTextEntry1] : Patient is a 83-year-old male with history of obesity, MDS, coronary artery disease, peripheral artery disease, renal artery stent, hypertension, hyperlipidemia, peripheral neuropathy, gout, lumbar sacral stenosis, atrial fibrillation chronic, gait instability, who presents for follow-up and complains of frequency of urination back pain  1.  Back pain Most likely consistent with musculoskeletal pain Trial of Tylenol 2 tabs 3 times a day Home PT if can be arranged Follow-up in 6 weeks  2.  Urinary frequency Check UA rule out infection If develops fever go to ER due to pancytopenia  3.  Pancytopenia Secondary to Revlimid MDS Follow-up with hematology has repeat blood counts on March 7  4.  Gait instability uses walker or wheelchair getting around Referral for physical therapy for gait training  5 hyperlipidemia Continue Crestor 10 mg check lipid profile  #6 hypertension Continue amlodipine to 5 mg   7.  Gout Continue allopurinol 100 mg 2 tabs a day check uric acid  8 chronic A-fib Continue Plavix 75 mg unable to tolerate Eliquis Continue metoprolol ER 50 mg a day rate controlled  Follow-up in 4 to 6 weeks Labs drawn in office

## 2024-03-06 ENCOUNTER — NON-APPOINTMENT (OUTPATIENT)
Age: 84
End: 2024-03-06

## 2024-03-07 ENCOUNTER — LABORATORY RESULT (OUTPATIENT)
Age: 84
End: 2024-03-07

## 2024-03-11 ENCOUNTER — RESULT REVIEW (OUTPATIENT)
Age: 84
End: 2024-03-11

## 2024-03-11 ENCOUNTER — APPOINTMENT (OUTPATIENT)
Dept: HEMATOLOGY ONCOLOGY | Facility: CLINIC | Age: 84
End: 2024-03-11
Payer: MEDICARE

## 2024-03-11 ENCOUNTER — APPOINTMENT (OUTPATIENT)
Dept: HEMATOLOGY ONCOLOGY | Facility: CLINIC | Age: 84
End: 2024-03-11

## 2024-03-11 ENCOUNTER — APPOINTMENT (OUTPATIENT)
Dept: INFUSION THERAPY | Facility: HOSPITAL | Age: 84
End: 2024-03-11

## 2024-03-11 LAB
BASOPHILS # BLD AUTO: 0.1 K/UL — SIGNIFICANT CHANGE UP (ref 0–0.2)
BASOPHILS NFR BLD AUTO: 3.3 % — HIGH (ref 0–2)
EOSINOPHIL # BLD AUTO: 0.06 K/UL — SIGNIFICANT CHANGE UP (ref 0–0.5)
EOSINOPHIL NFR BLD AUTO: 2 % — SIGNIFICANT CHANGE UP (ref 0–6)
HCT VFR BLD CALC: 34.7 % — LOW (ref 39–50)
HGB BLD-MCNC: 11.6 G/DL — LOW (ref 13–17)
IMM GRANULOCYTES NFR BLD AUTO: 2.3 % — HIGH (ref 0–0.9)
LYMPHOCYTES # BLD AUTO: 1.37 K/UL — SIGNIFICANT CHANGE UP (ref 1–3.3)
LYMPHOCYTES # BLD AUTO: 44.9 % — HIGH (ref 13–44)
MCHC RBC-ENTMCNC: 33.4 G/DL — SIGNIFICANT CHANGE UP (ref 32–36)
MCHC RBC-ENTMCNC: 37.8 PG — HIGH (ref 27–34)
MCV RBC AUTO: 113 FL — HIGH (ref 80–100)
MONOCYTES # BLD AUTO: 0.18 K/UL — SIGNIFICANT CHANGE UP (ref 0–0.9)
MONOCYTES NFR BLD AUTO: 5.9 % — SIGNIFICANT CHANGE UP (ref 2–14)
NEUTROPHILS # BLD AUTO: 1.27 K/UL — LOW (ref 1.8–7.4)
NEUTROPHILS NFR BLD AUTO: 41.6 % — LOW (ref 43–77)
NRBC # BLD: 0 /100 WBCS — SIGNIFICANT CHANGE UP (ref 0–0)
PLATELET # BLD AUTO: 265 K/UL — SIGNIFICANT CHANGE UP (ref 150–400)
RBC # BLD: 3.07 M/UL — LOW (ref 4.2–5.8)
RBC # FLD: 18.2 % — HIGH (ref 10.3–14.5)
WBC # BLD: 3.05 K/UL — LOW (ref 3.8–10.5)
WBC # FLD AUTO: 3.05 K/UL — LOW (ref 3.8–10.5)

## 2024-03-11 PROCEDURE — 99213 OFFICE O/P EST LOW 20 MIN: CPT

## 2024-03-11 RX ORDER — SERTRALINE 25 MG/1
25 TABLET, FILM COATED ORAL
Qty: 30 | Refills: 3 | Status: COMPLETED | COMMUNITY
Start: 2023-09-07 | End: 2024-03-11

## 2024-03-12 ENCOUNTER — APPOINTMENT (OUTPATIENT)
Dept: INFUSION THERAPY | Facility: HOSPITAL | Age: 84
End: 2024-03-12

## 2024-03-13 ENCOUNTER — APPOINTMENT (OUTPATIENT)
Dept: INFUSION THERAPY | Facility: HOSPITAL | Age: 84
End: 2024-03-13

## 2024-03-13 NOTE — RESULTS/DATA
[FreeTextEntry1] : 3/13/2024 WBC 3.05 Hgb 11.6 Platelets 265 Normal LDH and UA Cr 1.99, eGFR 33  2/12/2024  HGB 11.3   WBC 3.19  BONE MARROW  	 Patient:     MASOOD MACARIO   Accession:                             [id number]  Collected Date/Time:                   2/2/2024 11:08 EST Received Date/Time:                    2/3/2024 11:15 EST  Hematopathology Report - Auth (Verified)  Specimen(s) Submitted 1. Bone marrow biopsy MH 2. Bone marrow aspirate for Flow OP  Final Diagnosis 1, 2. Bone marrow biopsy and bone marrow aspirate      - Cellular bone marrow with   erythroid predominant  trilineage  hematopoiesis, reduced  myelopoiesis and  adequate  megakaryopoiesis See note and description.  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  Current biopsy shows cellular bone marrow with    erythroid predominant  trilineage  hematopoiesis, reduced  myelopoiesis and adequate  megakaryopoiesis  with few small  hypolobated forms. Aspirate smears show  dysplastic  features in erythroid  elements  including nuclear irregularities, budding and   megloblastoid  changes.  Suggest correlation with pending   cytogenetics , FISH and myeloid  NGS panel. Comprehensive report with results of pending ancillary studies to follow.  Ancillary studies Bone marrow aspirate iron stain:   Iron stores are increased; ring  sideroblasts are not increased. Flow  cytometry ([id number]):     -  The lymphocyte   immunophenotypic findings show no diagnostic abnormalities.     -  Myeloblasts (0.65% of cells), positive for   myeloperoxidase , HLA-DR, CD34, , CD33, CD13, partial dim CD7; negative for CD15, CD16, CD64, CD2, CD4.      - The  myeloid immunophenotypic  findings show normal  myeloid granularity, no increase in   myeloid immaturity, and normal  myeloid antigen maturation pattern, and the presence of    hematogones (which are reported to be low in   myelodysplasia). Immunohistochemical  stains:  Immunostains CD34, ,  Myeloperoxidase , CD15, CD71, E- cadherin , CD61, Factor VIII, p53 are performed on block 1A. CD34 stains less than 1% cells.  stains increased scattered mast cells.  Myeloperoxidase , CD15 highlight  myeloid elements. CD71 stains many clusters of   erythroid elements and show  erythroid predominance. E- cadherin  stains few pronormoblasts . CD61, factor VIII stains  megakaryocytes  and highlight small  hypolobated forms. p53 (dim) stains few scattered cells. Cytogenetics : Pending FISH:  Pending  Microscopic description: 1. Biopsy:  Sections of bone marrow biopsy and bone marrow fragments in clot show variable  cellularity  (5% -50% cellularity ) in a fragmented and hemorrhagic core biopsy,   erythroid predominant  trilineage hematopoiesis with maturation, reduced   myelopoiesis. M:E ratio is reduced.  Megakaryocytes  are normal in number with occasional small  hypolobated forms. Iron stores are increased.  2. Aspirate:  Spicular  and cellular; adequate for interpretation. Maturing and mature  myeloid and  erythroid elements are present.  Erythroid  elements are markedly increased.   Erythroid elements show progressive maturation with  dysplastic features including nuclear irregularities, budding and  megaloblastoid changes. M:E ratio (0.3:1) is reduced.    Megakaryocytes appear normal in number and occasional small forms are present. Many scattered mast cells are present.  	 Flow Cytometry Final Report ________________________________________________________________________ Specimen: Bone marrow aspirate Date Collected: 02/02/2024 Date Received: 02/02/2024 Date Processed: 02/02/2024 Date Reported: 02/07/2024 16:45 Accession #: [id number] ________________________________________________________________________ CLINICAL DATA: Clinical history of myelodysplastic syndrome, rule out acute myeloid leukemia  ________________________________________________________________________ CD45/Side Scatter Differential Granulocytes: 45 % ;    Lymphocytes: 29 % ;    Monocytes: 4 % ;    Dim CD45 and/or blast gate: 5 % ;    Erythroid/debris: 15 % ________________________________________________________________________ DIAGNOSIS: Bone marrow aspirate:     -  The lymphocyte immunophenotypic findings show no diagnostic abnormalities.     - Myeloblasts (0.65% of cells), positive for myeloperoxidase, HLA-DR, CD34, , CD33, CD13, partial dim CD7; negative for CD15, CD16, CD64, CD2, CD4.      - The myeloid immunophenotypic findings show normal myeloid granularity, no increase in myeloid immaturity, and normal myeloid antigen maturation pattern, and the presence of hematogones (which are reported to be low in myelodysplasia). Please see interpretation.  INTERPRETATION: MORPHOLOGY: Maturing trilineage hematopoiesis CYTOSPIN: Maturing and mature myeloid elements with no significant lymphocytosis or immaturity; pronormoblasts present.  IMMUNOPHENOTYPE: Lymphocytes (29% of cells): Heterogeneous population of T-cells (with normal CD4 to CD8 ratio, including CD57+ natural killer-like T-cells, gamma/delta T-cells), natural killer cells, and polytypic B-cells. The lymphocyte immunophenotypic findings show no diagnostic abnormalities.  Myeloblasts (0.65% of cells), positive for myeloperoxidase, HLA-DR, CD34, , CD33, CD13, partial dim, CD7; negative for CD15, CD16, CD64, CD2, CD4.  CD45/side scatter shows 0.65% myeloblast population and normal myeloid granularity. There is no increase in CD34, CD14/CD64 or  positive cells. Myeloid antigen pattern is normal with CD13, CD16, CD11b, CD15, and CD33. Granulocytes express CD56.  Hematogones are present. The myeloid immunophenotypic findings show normal myeloid granularity, no increase in myeloid immaturity, and normal myeloid antigen maturation pattern, and the presence of hematogones (which are reported to be low in myelodysplasia). _____________________________________________________________________  PATHOLOGY: MOLECULAR   [employer] Advanced NGS Myeloid Panel Final Report  RESULT SUMMARY: ABNORMAL  DETECTED GENOMIC ALTERATIONS:   Tier I: Variants of Strong Clinical Significance   SF3B1 p.[id number]   Tier II: Variants of Potential Clinical Significance   DNMT3A p.?   Tier III: Variants of Unknown Clinical Significance   KIT p.[id number]   12/7/2023 HGB 10.4 WBC 3.47    9/18/2023  (Before and after stopping Revlimid)  WBC 1.14 > 6.08 Hgb 7.3  > 8.8  > 210  8/9/2023 Most recent blood work from 8/9/2023 WBC 1.14  Hgb 7.3     7/2/2023 Hgb 9.9, .4 WBC platelet normal  Cr 2.3 eGFR 28  Hypokalemic on 6/28 3.3  5/22/2023 WBC and platelet WNL Hgb 11.1  .9 CMP from 5/4-  Cr 1.79, eGFR 38 Pending CMP LDH UA today  3/24/2023 WBC 6.28 Hgb 10.2 .7 Platelet 224 Last Cr (2/24/23) - 2.42 Pending repeat CBC, CMP, LDH, UA  2/24/2023 WBC 2.62 Hgb 8.8 Platelet 128 Pending CMP, LDH, UA  1/26/2023 Last Hgb 9.1, Cr 2.3 (12/16/2022)    12/16/2022 HGB on 10/31/2022; 8.3,  Bone marrow biopsy:  Patient:   MASOOD MACARIO   Accession:                             [id number]  Collected Date/Time:                   10/31/2022 16:49 EDT Received Date/Time:                    10/31/2022 16:50 EDT  Hematopathology Addendum Report - Auth (Verified)  Hematopathology Addendum Additional immunohistochemical stains (block 1A: p53, ) show  increased  positive interstitial mast cells without aggregates. TP53  shows less than 1% bright positive cells.  There is no change in the diagnosis.  Verified by: Brooklynn Erazo (Electronic Signature) Reported on: 11/17/22 09:24 [facility], [facility], [address], [place] [address] Phone: [phone]   Fax: [phone] _________________________________________________________________  Disclaimer Slide(s) with built in immunohistochemical study control(s) and negative  control associated with this case has/have been verified by the sign out  pathologist.  For slide(s) without built in controls positive control slides has/have  been reviewed and approved by immunohistochemistry lab  These immunohistochemical/ in-situ hybridization tests have been developed  and their performance characteristics determined by [facility], Department of Pathology, Division of Immunopathology,  [address] [place] [address].  It has not been cleared  or approved by the U.S. Food and Drug Administration.  The FDA has  determined that such clearance or approval is not necessary.  This test  is used for clinical purposes.  The laboratory is certified under the  CLIA-88 as qualified to perform high complexity clinical testing. Hematopathology Addendum Report - Auth (Verified)  Hematopathology Addendum       Comprehensive Hematopathology Report  Final Diagnosis : 1, 2. 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 The bone marrow  shows hypercellularity with erythroid hyperplasia and increased ring  sideroblasts and dysplastic megakaryocytosis. Interstitial mast cells  are increased with normal morphology, few CD25 positive, negative CD30.  The findings show myelodysplastic syndrome with multilineage dysplasia  (hypolobulated megakaryocytes) and ring sideroblasts. Correlation with  cytogenetics, FISH, and somatic mutation analysis to evaluate for complex  karyotype, del(5q), -7, del(7q) SF3B1, TP53, other abnormalities is  done. Please note findings of an   abnormal male karyotype, 46,XY,del(5)(q15q33) [13]/46,XY[7],  a positive MDS FISH panel and [facility]  Myeloid Report showing   Tier I: Variants of Strong Clinical Significance:  SF3B1 p.Wdi232Owd (VAF: 39%),   Tier II: Variants of Potential Clinical   Significance: DNMT3A p.?  (VAF: 4%), Tier III: Variants of Unknown  Clinical Significance:   KIT p.Ytt484Gjd  (VAF: 50%) . Based on the  additional findings, the MDS classification (ICC) is MDS with del(5q) and  with WHO is MDS with low blasts and 5q deletion.  Dr. Mcmahon was notified of the diagnosis on 11/07/2022.  Morphology: Microscopic description: 1. Biopsy:   Sections of bone marrow biopsy and bone marrow fragments in  clot show hypercellularity (50-85% cellularity), erythroid predominance  with maturation and increased pronormoblasts, maturing and mature  myeloid elements, megakaryocytes at least normal in number with abnormal  morphology (increased hypolobulated/small forms), increased interstitial  mast cells without aggregates, and iron stores increased.  2. Aspirate:   Cellular spicules are present, adequate for interpretation.   Maturing and mature myeloid and erythroid elements are present with  erythroid predominance (M:E ratio (1.16:1).  Megakaryocytes appear at  least normal in number with small/hypolobulated morphology. Mast cells  are increased in the spicules (round and normally granular).  Bone Marrow Aspirate Differential: (100 Cells). Type  % Normal* Blast  0% 0-3 Neutrophil and    Precursors   47% 33-63 Eosinophil  3% 1-5 Basophil  0% 0-1 Pronormoblast  5% 0-2 Normoblast  43% 15-25 Monocyte  0% 0-2 Lymphocyte  7% 10-15 Plasma cell  0% 0-1   *Adult Range  Comment Iron stain (examined to evaluate for iron stores; see microscopic  description) and Giemsa stain (shows appropriate staining pattern) are  performed and evaluated on block(s): 1A, 1B  Ancillary Studies: Bone marrow aspirate iron stain:   Iron stores are increased; numerous ring  sideroblasts are present (greater than 15%).  Flow cytometry:   The myeloid immunophenotypic findings show decreased  myeloid granularity, no increase in myeloid immaturity (myeloblasts,  0.32% of cells, with normal immunophenotype), normal myeloid antigen  maturation pattern, few mast cells, and the presence of hematogones  (which are reported to be low in myelodysplasia). The lymphocyte immunophenotypic findings show no diagnostic abnormalities.  Immunohistochemical stains   (block 1A: CD34, , myeloperoxidase,  CD71, E-cadherin, factor VIII, CD15, CD61, CD25, CD30, p53) show no  increase in CD34 positive cells (less than 3%), erythroid predominance  (positive with CD71), with clusters of pronormoblasts (positive with  E-cadherin); diminished myeloid elements with maturation (positive   with myeloperoxidase and CD15), and increased megakaryocyte number with  dysplastic, small/hypolobulated morphology (positive with factor VIII,  CD61). CD30 is negative in mast cells; a few show dim CD25 positivity.  Cytogenetics:  [id number] Date Collected:  10/31/2022 Result:  Abnormal male karyotype Karyotype: 46,XY,del(5)(q15q33)[13]/46,XY[7]  Fluorescence in situ Hybridization (FISH):    [id number] Result: ABNORMAL FISH MDS PANEL - 5q deletion detected (65%) Probe(s) and Location(s):   Z3R617/ D5S23 (5p15.2), EGR1 (5q31), D7Z1  (7p11.1-q11.1), K5K477 (7q31), CEP-8/D8Z2   ? (8p11.1-q11.1), S44C835  (20q12) ISCN Nomenclature:  nuc clif(V6H627/K2K54u4,EGR1x1)[130/200],  (D7Z1,J6H873)x2[200], (D8Z2,G45W971)x2[197/200], (TP53x2)[197/200]  Molecular Analyses: [facility] Advanced NGS Myeloid Report Specimen ID:  [id number] Date Collected:  10/31/2022 Specimen Source:  Bone Marrow  RESULT SUMMARY:  ABNORMAL DETECTED GENOMIC ALTERATIONS: Tier I: Variants of Strong Clinical Significance SF3B1 p.Cpm466Bdd Tier II: Variants of Potential Clinical Significance DNMT3A p.? Tier III: Variants of Unknown Clinical Significance KIT p.Qzr104Fkg  PERTINENT NEGATIVE RESULTS: The following genes are NEGATIVE for clinically relevant mutations.  Mutational hotspots and surrounding exonic regions were interrogated for  DNA level point mutations and indels (fusions not assayed). ABL1, ANKRD26, ASXL1, ATRX, BCOR, BCORL1, BRAF, CALR, CBL, CCND2, CDKN2A,  CEBPA, CSF3R, CUX1, DDX41, ETNK1, ETV6, EZH2, FBXW7, FLT3, GATA2, HRAS,  IDH1, IDH2, JAK2, KDM6A, KMT2A, KRAS, MAP2K1, MPL, MYD88, NF1, NPM1,  NRAS, PDGFRA, PHF6, PTEN, PTPN11, RUNX1, SETBP1, SRSF2, STAG2, TET2,  TP53, U2AF1, WT1, ZRSR2 TECHNICAL SUMMARY : DNMT3A p.? c.2174-1G>A 4% allele frequency Exon 19 [id number]  SF3B1 p.Stl507Eoi c.1998G>C 39% allele frequency Exon 14 [id number]   KIT p.Gqi527Qdi c.1879C>T 50% allele frequency Exon 12 [id number]  Clinical History/Data  : History of macrocytic anemia with CKD rule out primary bone marrow  disorder  CBC, 10/31/2022  Test Code       Result         Reference                  Range WBC             5.75 K/uL      3.80 - 10.50 RBC             2.34 M/uL L    4.20 - 5.80 HGB             8.3 g/dL L     13.0 - 17.0 HCT             25.4 % L       39.0 - 50.0 MCV             108.5 fl H     80.0 - 100.0 MCH             35.5 pg H      27.0 - 34.0 MCHC            32.7 g/dL      32.0 - 36.0 RDW             18.5 % H       10.3 - 14.5 PLT             236 K/uL       150 - 400 Auto NRBC       0 /100 WBCs    0 - 0 NEUT%           63.5 %         43.0 - 77.0  NEUT#           3.65 K/uL      1.80 - 7.40 LYMPH%          20.5 %         13.0 - 44.0 LYMPH#          1.18 K/uL      1.00 - 3.30 MONO%           7.0 %          2.0 - 14.0 MONO#           0.40 K/uL      0.00 - 0.90 EOS%            3.3 %          0.0 - 6.0 EOS#            0.19 K/uL      0.00 - 0.50 BASO%           1.4 %          0.0 - 2.0 BASO#           0.08 K/uL      0.00 - 0.20 BUN             28 mg/dL H     7 - 23 Creatinine      2.32 mg/dL H   0.50 - 1.30  Verified by: Brooklynn Erazo (Electronic Signature) Reported on: 11/09/22 16:40 [facility], [facility], [address] [place] [address] Phone: [phone]   Fax: [phone]  10/27/2022 (Labs on 10/18/2022): Hgb stable at 9.0, normal WBC, platelets Creatinine 2.32 eGFR 28   5/12/2022 Available labs reviewed.  Macrocytic anemia, no etiology could be determined.

## 2024-03-13 NOTE — ASSESSMENT
[FreeTextEntry1] : 83-year-old Mr. MACARIO is seen in follow up for macrocytic anemia (initial evaluation: HGB ~9.5, MCV ~125), WBC and platelet counts are preserved. Patient now has some symptoms of anemia. 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 He was started on Revlimid in December but was unable to tolerate due to multiple side effects. Recent hospitalization for new onset afib + anemia. Course c/b acute gout flare, neutropenic fever, transaminitis, and JUAN RAMON on CKD. Since his discharge from the hospital, he has had two vagal episodes with afib RVR which is now being controlled by 25mg Metoprolol BID. Clinically, he is responding well to the Procrit injections with his Hgb ~10 and appears with more energy and strength. Restarted Revlimid 5mg daily, tolerating well. Weekly CBC and CMP to monitor his blood counts as well as liver/kidney function. Continue Procrit injections weekly to maintain Hgb > 11. 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) - Discussed and reviewed bone marrow findings with the patient - Started on Revlimid 5 mg daily (recommended dose 10 mg) and held in January 2023 due to cytopenias req hospitalization - Started 20,000U Procrit injections since February with great response (Hgb 10+)  - Restarted Revlimid 5 mg daily (June 2023), tolerating well but cytopenia worsened - Increase Procrit to 40,000 units QW (8/10/2023); hold for Hgb > 11 - Held Revlimid as of today (8/10/2023) for worsening pancytopenia - Cytopenia including HGB improved - Discussed and planned to start HMA (S/Q) - Patient consented (9/18/2023)  - C1D1 on 10/1/2023, C2 D1 on 10/30/2023 - Admitted to hospital for sepsis, Cycle 3 delayed - Last transfusion was in 11/2023 - Today (2/12/2024): Hgb 11.3, Plt 216, WBC 3.19, ANC 1.27 - Post C4 bone marrow: Discussed; response noted; Tri-lineage hematopoiesis, no increase in blasts  - He is cleared for the C6 treatment  - Transfuse for Hgb < 8 (if symptomatic) and platelets < 20k  - Home draw labs on Mondays and Thursdays for poss platelet transfusion on Tuesdays and Fridays - So far has not required transfusions since starting Dacogen tx - C/w trending CBC, CMP, LDH weekly home draws (order in)  - Self administer 40k U Procrit when Hgb < 11 - HOLD FOR HGb > 11**** - RTC in 1 month before C7 treatment   [ ] Afib, rate controlled, resolved - New onset afib - Likely multifactorial etiology of dehydration and anemia - [id number] score = 5 - On 6.25mg of Carvedilol - Cardiology held ASA and Plavix due to anemia - Evaluated by cards and started on Metoprolol - Now afib rate controlled  [ ] Gout - C/w Allopurinol and Colchicine - Right toe without S&S of infection - Continue Allopurinol - Continue f/u with rheum  [ ] Back pain - Last MRI in 2021 with lumbar spondylosis, with spinal canal narrowing - Left flank pain may be r/t the stenosis causing pinched nerve pains - Currently on Gabapentin 200mg daily - Increased to Gabapentin 300mg TID - Repeat MRI with no contrast (6/20) - chronic lumbar spondylosis - Recommended spine specialist - pt deferred at this time and will f/u w/ rheum  [ ] Hypokalemia - K of 3.3 on 6/28 - Rx sent for 20meq klor x2 to be taken every other day reevaluate next week - Caution with potassium replenishment as pt has CKD.

## 2024-03-13 NOTE — PHYSICAL EXAM
[Ambulatory and capable of all self care but unable to carry out any work activities] : Status 2- Ambulatory and capable of all self care but unable to carry out any work activities. Up and about more than 50% of waking hours [Normal] : affect appropriate [de-identified] : seen in wheelchair [de-identified] : afib rate controlled  [de-identified] : unsteady gait, 2 ppl assist

## 2024-03-13 NOTE — HISTORY OF PRESENT ILLNESS
[de-identified] : CHART REVIEW: 81-year-old Mr. MACARIO is seen in consult for macrocytic anemia (Hgb 9.5, MCV ~125). Patient has multiple medical conditions including stage-3 CKD. He has no symptoms of anemia and has no complaints.  [de-identified] : 05/12/22: 81 year-old Mr. MACARIO is seen in consult for macrocytic  anemia (Hgb 9.5, MCV ~125). Patient has multiple medical conditions including stage-3 CKD. He has no symptoms of anemia and has no complaints.   10/27/22: Patient presenting today for follow up for macrocytic anemia. Overall feels well. Endorses some fatigue and shortness of breath with activity. Denies headache, dizziness, Ambulates with walker. He is being followed by his nephrologist who manages his hypertension. Endorses taking diuretics every other day.    12/16/2022 Patient returns for a follow up. He endorses no change in his health status. 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 Patient has some symptoms of fatigue and tiredness.   1/26/2023 Patient returns for a follow up. He started taking Revlimid on 12/30/2022. About 2 weeks later he started developing side effects like-- loss  of appetite, body ache, constipation, pins and needle sensation. He also appears to have developed Jaundice.   2/24/2023 Patient seen in follow up, accompanied by Preeti ceja, at bedside. He had a recent hospitalization from 1/27 - 2/7 for new onset afib that was detected in the treatment room prior to his blood transfusion appointment. He was found to be in afib, anemic (Hgb 6.3) requiring 4 PRBCs throughout his stay. His course was c/b acute gout flare, neutropenic fever, transaminitis, and JUAN RAMON on CKD. 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 Had an episode of neutropenic fever started on Cefepime but stopped as per ID, most likely due to gout flare. UA + but asymptomatic, not treated. Bld cx neg x2. Syncopal episode while being on the commode yesterday 2/24. As per Preeti, EKG revealed rate controlled afib. Today, his HR was 127 in office. Denies dizziness, SOB, chest pain, palpitations. As per pt, was on a holter monitor that was submitted yesterday. He is f/u with Dr. Reddy (cards). He also have been having a dry cough for few days. + chills. No fever, body aches, night sweats. No sick contacts. Working with PT. Still feels weak, ambulating via wheelchair. Appetite is okay. Weight is stable.   3/24/2023 Patient seen in follow up. Accompanied by Preeti ceja at bedside. He has been responding well to 20k U Procrit weekly injections. Last Hgb 10.2, BP stable 111/58. Since his last visit, has been evaluated by cards for his afib (now rate controlled) currently on Metoprolol 25mg BID. He reports feeling much better since last encounter - he is now able to ambulate longer distances with a walker. Endorses unsteady gait (vertigo). Appetite is good, weight is stable.   5/22/2023 Patient seen in follow up. Accompanied by aide and daughter at bedside. Had a recent UTI, treated with Amoxicillin. He'll be following up with his PCP tomorrow. He endorses left flank pain that started after his UTI that is hindering him from ambulating. Describes the pain to be "shocking" and sharp in nature, pain reproduced by movements. His last MRI was in 2021 which revealed lumbar spondylosis with mild to moderate spinal canal narrowing. + peripheral neuropathy of his right lower ext. Currently on Gabapentin 200mg daily. His Hgb has been stable since April 27th. Last Hgb 11.1 last week. Otherwise, no complaints. Appetite is good and weight is stable.  7/5/2023 Patient seen in follow up. With aide and daughter at bedside. Interim, he's been feeling well. He completed first month of starting Revlimid without any side effects. His most recent CBC from last week 9.7, he plans on self administering Procrit today after the visit. He had MRI done 6/20 which showed degenerative vs inflammation of his lumbar spine. He continues to experience back pain and some scapular pain from pulled muscle last week. Recommended ortho / pain management for chronic back pain but pt denied at this time. He reports feeling weaker in his lower extremities but does not want to participate in PT at this time. Otherwise, feeling well. Appetite is good, weight is stable.   8/10/2023 Patient presents for a follow up. He appears pale. He feels weak and sleepy after taking the Lenalidomide pill. He is not doing any PT. He is not able to walk by himself yet. Hs anemia has become worse despite EUGENIA. Lenalidomide may not be helping to improve his disease but worsening the pancytopenia.    9/18/2023 Patient seen in follow up. He was taken off Revlimid in his last visit and his EUGENIA dose was increased to 40KU. His HGB increased to ~9g (from 7) and neutropenia and thrombocytopenia resolved. He looks and feels better. We discussed starting HMA.   12/7/2023 Patient presents for a follow up and evaluation for treatment. He completed 2 cycles of HMA (Dacogen). Before the 3rd cycle, he was admitted to the hospital for low O2Sat. He received IV abx for suspected sepsis. He was discharged a week ago. His physical condition has improved. His last transfusion (PRBC) was 3 weeks ago (11/2023). His Hgb is 10.4, platelets 175, WBC 3.47. He is continuing on Retacrit at home. He is holding his counts without transfusions. It appears that he is responding to the treatment.   2/12/2024  3/11/2024 Patient seen the tx room for a follow up appt. Today is C6D1. He is accompanied by Preeti, his aide and his son at bedside. Interim, he's been in good health. He is now able to climb up the stairs. He is slowly regaining his strength. He has been giving himself the Procrit despite his Hgb being > 11, we had a lengthy discussion again today going over the parameters for Procrit injections (ONLY ADMINISTER WHEN HGB < 11). The patient, as well as his aide and his son verbalized full understanding

## 2024-03-14 ENCOUNTER — APPOINTMENT (OUTPATIENT)
Dept: INFUSION THERAPY | Facility: HOSPITAL | Age: 84
End: 2024-03-14

## 2024-03-14 ENCOUNTER — RESULT REVIEW (OUTPATIENT)
Age: 84
End: 2024-03-14

## 2024-03-14 ENCOUNTER — APPOINTMENT (OUTPATIENT)
Dept: HEMATOLOGY ONCOLOGY | Facility: CLINIC | Age: 84
End: 2024-03-14

## 2024-03-14 LAB
BASOPHILS # BLD AUTO: 0.13 K/UL — SIGNIFICANT CHANGE UP (ref 0–0.2)
BASOPHILS NFR BLD AUTO: 4.1 % — HIGH (ref 0–2)
EOSINOPHIL # BLD AUTO: 0.08 K/UL — SIGNIFICANT CHANGE UP (ref 0–0.5)
EOSINOPHIL NFR BLD AUTO: 2.5 % — SIGNIFICANT CHANGE UP (ref 0–6)
HCT VFR BLD CALC: 33.7 % — LOW (ref 39–50)
HGB BLD-MCNC: 11.6 G/DL — LOW (ref 13–17)
IMM GRANULOCYTES NFR BLD AUTO: 1.3 % — HIGH (ref 0–0.9)
LYMPHOCYTES # BLD AUTO: 1.35 K/UL — SIGNIFICANT CHANGE UP (ref 1–3.3)
LYMPHOCYTES # BLD AUTO: 42.9 % — SIGNIFICANT CHANGE UP (ref 13–44)
MCHC RBC-ENTMCNC: 34.4 G/DL — SIGNIFICANT CHANGE UP (ref 32–36)
MCHC RBC-ENTMCNC: 38 PG — HIGH (ref 27–34)
MCV RBC AUTO: 110.5 FL — HIGH (ref 80–100)
MONOCYTES # BLD AUTO: 0.22 K/UL — SIGNIFICANT CHANGE UP (ref 0–0.9)
MONOCYTES NFR BLD AUTO: 7 % — SIGNIFICANT CHANGE UP (ref 2–14)
NEUTROPHILS # BLD AUTO: 1.33 K/UL — LOW (ref 1.8–7.4)
NEUTROPHILS NFR BLD AUTO: 42.2 % — LOW (ref 43–77)
NRBC # BLD: 0 /100 WBCS — SIGNIFICANT CHANGE UP (ref 0–0)
PLATELET # BLD AUTO: 270 K/UL — SIGNIFICANT CHANGE UP (ref 150–400)
RBC # BLD: 3.05 M/UL — LOW (ref 4.2–5.8)
RBC # FLD: 17.9 % — HIGH (ref 10.3–14.5)
WBC # BLD: 3.15 K/UL — LOW (ref 3.8–10.5)
WBC # FLD AUTO: 3.15 K/UL — LOW (ref 3.8–10.5)

## 2024-03-15 ENCOUNTER — APPOINTMENT (OUTPATIENT)
Dept: INFUSION THERAPY | Facility: HOSPITAL | Age: 84
End: 2024-03-15

## 2024-03-15 LAB
ALBUMIN SERPL ELPH-MCNC: 4.3 G/DL
ALP BLD-CCNC: 107 U/L
ALT SERPL-CCNC: 7 U/L
ANION GAP SERPL CALC-SCNC: 14 MMOL/L
AST SERPL-CCNC: 11 U/L
BILIRUB SERPL-MCNC: 0.6 MG/DL
BUN SERPL-MCNC: 22 MG/DL
CALCIUM SERPL-MCNC: 9.1 MG/DL
CHLORIDE SERPL-SCNC: 100 MMOL/L
CO2 SERPL-SCNC: 28 MMOL/L
CREAT SERPL-MCNC: 1.99 MG/DL
EGFR: 33 ML/MIN/1.73M2
GLUCOSE SERPL-MCNC: 103 MG/DL
LDH SERPL-CCNC: 235 U/L
POTASSIUM SERPL-SCNC: 4.4 MMOL/L
PROT SERPL-MCNC: 7.2 G/DL
SODIUM SERPL-SCNC: 142 MMOL/L
URATE SERPL-MCNC: 6.3 MG/DL

## 2024-03-18 ENCOUNTER — LABORATORY RESULT (OUTPATIENT)
Age: 84
End: 2024-03-18

## 2024-03-18 ENCOUNTER — RX RENEWAL (OUTPATIENT)
Age: 84
End: 2024-03-18

## 2024-03-18 ENCOUNTER — OUTPATIENT (OUTPATIENT)
Dept: OUTPATIENT SERVICES | Facility: HOSPITAL | Age: 84
LOS: 1 days | End: 2024-03-18

## 2024-03-18 ENCOUNTER — OUTPATIENT (OUTPATIENT)
Dept: OUTPATIENT SERVICES | Facility: HOSPITAL | Age: 84
LOS: 1 days | Discharge: HOME | End: 2024-03-18

## 2024-03-18 DIAGNOSIS — Z98.49 CATARACT EXTRACTION STATUS, UNSPECIFIED EYE: Chronic | ICD-10-CM

## 2024-03-18 DIAGNOSIS — D64.9 ANEMIA, UNSPECIFIED: ICD-10-CM

## 2024-03-18 DIAGNOSIS — Z98.890 OTHER SPECIFIED POSTPROCEDURAL STATES: Chronic | ICD-10-CM

## 2024-03-18 DIAGNOSIS — Z90.79 ACQUIRED ABSENCE OF OTHER GENITAL ORGAN(S): Chronic | ICD-10-CM

## 2024-03-18 DIAGNOSIS — Z86.79 PERSONAL HISTORY OF OTHER DISEASES OF THE CIRCULATORY SYSTEM: Chronic | ICD-10-CM

## 2024-03-18 DIAGNOSIS — D46.9 MYELODYSPLASTIC SYNDROME, UNSPECIFIED: ICD-10-CM

## 2024-03-19 ENCOUNTER — NON-APPOINTMENT (OUTPATIENT)
Age: 84
End: 2024-03-19

## 2024-03-19 ENCOUNTER — APPOINTMENT (OUTPATIENT)
Dept: INFUSION THERAPY | Facility: HOSPITAL | Age: 84
End: 2024-03-19

## 2024-03-19 ENCOUNTER — APPOINTMENT (OUTPATIENT)
Dept: HEMATOLOGY ONCOLOGY | Facility: CLINIC | Age: 84
End: 2024-03-19

## 2024-03-21 ENCOUNTER — LABORATORY RESULT (OUTPATIENT)
Age: 84
End: 2024-03-21

## 2024-03-22 ENCOUNTER — APPOINTMENT (OUTPATIENT)
Dept: INFUSION THERAPY | Facility: HOSPITAL | Age: 84
End: 2024-03-22

## 2024-03-22 ENCOUNTER — APPOINTMENT (OUTPATIENT)
Dept: HEMATOLOGY ONCOLOGY | Facility: CLINIC | Age: 84
End: 2024-03-22

## 2024-03-25 ENCOUNTER — LABORATORY RESULT (OUTPATIENT)
Age: 84
End: 2024-03-25

## 2024-03-26 ENCOUNTER — APPOINTMENT (OUTPATIENT)
Dept: INFUSION THERAPY | Facility: HOSPITAL | Age: 84
End: 2024-03-26

## 2024-03-26 ENCOUNTER — APPOINTMENT (OUTPATIENT)
Dept: HEMATOLOGY ONCOLOGY | Facility: CLINIC | Age: 84
End: 2024-03-26

## 2024-03-27 ENCOUNTER — NON-APPOINTMENT (OUTPATIENT)
Age: 84
End: 2024-03-27

## 2024-03-27 ENCOUNTER — APPOINTMENT (OUTPATIENT)
Dept: CARDIOLOGY | Facility: CLINIC | Age: 84
End: 2024-03-27
Payer: MEDICARE

## 2024-03-27 VITALS
WEIGHT: 180 LBS | DIASTOLIC BLOOD PRESSURE: 84 MMHG | HEIGHT: 66 IN | BODY MASS INDEX: 28.93 KG/M2 | OXYGEN SATURATION: 99 % | SYSTOLIC BLOOD PRESSURE: 153 MMHG | HEART RATE: 77 BPM

## 2024-03-27 DIAGNOSIS — I73.9 PERIPHERAL VASCULAR DISEASE, UNSPECIFIED: ICD-10-CM

## 2024-03-27 PROCEDURE — G2211 COMPLEX E/M VISIT ADD ON: CPT

## 2024-03-27 PROCEDURE — 99214 OFFICE O/P EST MOD 30 MIN: CPT

## 2024-03-27 NOTE — PHYSICAL EXAM
[General Appearance - Well Developed] : well developed [Normal Appearance] : normal appearance [Well Groomed] : well groomed [General Appearance - Well Nourished] : well nourished [No Deformities] : no deformities [General Appearance - In No Acute Distress] : no acute distress [Normal Conjunctiva] : the conjunctiva exhibited no abnormalities [Eyelids - No Xanthelasma] : the eyelids demonstrated no xanthelasmas [Normal Oral Mucosa] : normal oral mucosa [No Oral Pallor] : no oral pallor [No Oral Cyanosis] : no oral cyanosis [Normal Jugular Venous A Waves Present] : normal jugular venous A waves present [Normal Jugular Venous V Waves Present] : normal jugular venous V waves present [No Jugular Venous Stearns A Waves] : no jugular venous stearns A waves [Respiration, Rhythm And Depth] : normal respiratory rhythm and effort [Exaggerated Use Of Accessory Muscles For Inspiration] : no accessory muscle use [Auscultation Breath Sounds / Voice Sounds] : lungs were clear to auscultation bilaterally [Heart Rate And Rhythm] : heart rate and rhythm were normal [Heart Sounds] : normal S1 and S2 [Abdomen Soft] : soft [Abdomen Tenderness] : non-tender [] : no hepato-splenomegaly [Abdomen Mass (___ Cm)] : no abdominal mass palpated [Cyanosis, Localized] : no localized cyanosis [Oriented To Time, Place, And Person] : oriented to person, place, and time [Affect] : the affect was normal [Mood] : the mood was normal [No Anxiety] : not feeling anxious [FreeTextEntry1] : see above

## 2024-03-27 NOTE — REASON FOR VISIT
[Follow-Up - Clinic] : a clinic follow-up of [FreeTextEntry2] : PAD [FreeTextEntry1] : 3/27  Since last visit patient reports injured right shin last weekend.  Also has eschar to R 2-4 digits, and L 3rd digit. Denies fever / chills. LE arterial duplex ordered last visit, was not performed.   TTE 2/2024  1. Left ventricular wall thickness is normal. Left ventricular systolic function is mildly decreased with an ejection fraction of 46 % by Andersen's method of disks. Regional wall motion abnormalities present.  2. Basal and mid inferior wall, basal and mid inferolateral wall, and basal anterolateral segment are abnormal.  3. There is calcification of the aortic valve leaflets. There is moderate aortic stenosis. The peak transaortic velocity is 2.30 m/s, peak transaortic gradient is 21.2 mmHg and mean transaortic gradient is 12.0 mmHg with an LVOT/aortic valve VTI ratio of 0.31. The aortic valve area is estimated at 1.06 cm by the continuity equation. There is mild aortic regurgitation.  4. There is mild (grade 1) left ventricular diastolic dysfunction, with normal filling pressure.  5. Normal right ventricular cavity size, wall thickness, and normal systolic function.  6. Mild mitral regurgitation.  7. No pericardial effusion seen.  8. Compared to the transthoracic echocardiogram performed on 10/3/2022, The aortic stenosis is now moderate.  3/19 Patient reports feeling improved. Reports residual LE edema. Continue Lasix daily. Office visit next week. Will check labs during that visit.  3/6 BNP elevated. Last CXR with pulmonary congestion noted. Currently reports taking Lasix 40 mg three times weekly. Increase Lasix 40 mg daily.  12/13  Patient currently without C/P or SOB. No LE edema. R hallux and 3rd digit wounds. L hallux wound.   Cr. improved to 1.8. Platelet count currently 150. Last Hgb above 10.  Hospital Course 11/20-11/27  Patient admitted for acute hypoxic respiratory failure due to decompensated heart failure. Labs remarkable for pancytopenia (QBC 1.63, Hgb 8.1, Plt 100) and proBNP 6700. TTE showed newly reduced EF of 40%, moderate LV diastolic dysfunction, moderate AS, normal RV size and function, basal and mid anterolateral wall, basal and mid inferior wall, basal and mid inferolateral wall, and basal anteroseptal segment are abnormal. CTA C/A/P showed small bilateral pleural effusions with mild interlobular septal thickening which may reflect mild pulmonary interstitial edema and compressive bilateral lower lobe atelectasis  Patient initially required BIPAP for increased work of breathing and tachypnea. He was started on IV diuretics and was eventually weaned to room air. Cardiology consulted for ischemic evaluation due to newly reduced EF seen on echocardiogram. Patient developed JUAN RAMON on CKD so LHC was deferred. JUAN RAMON thought to be secondary to contrast induced nephropathy. Kidney and bladder US showed atrophic right kidney, no hydronephrosis. On discharge, serum creatinine was 2.24.  Patient had intermittent episodes of atrial tachycardia while hospitalized. Home metoprolol succinate was increased to 150 mg XL.  Heme was consulted for pancytopenia and recommended no inpatient chemo. Patient received 1 unit of pRBCs to maintain Hgb>8. He showed no signs or symptoms of bleeding.  PT evaluated patient and recommended NERI. Patient declined NERI and is being discharged home with home PT.   Medication Changes: -Increased metoprolol succinate to 150 mg XL daily. -Stopped cilostazol due to black box warning for heart failure.   Medications:  acyclovir 400 mg oral tablet: 1 tab(s) orally 2 times a day allopurinol 100 mg oral tablet: 1 tab(s) orally once a day amLODIPine 5 mg oral tablet: 1 tab(s) orally once a day aspirin 81 mg oral capsule: 1 cap(s) orally once a day cholecalciferol oral tablet: 2000 unit(s) orally once a day cyclobenzaprine 5 mg oral tablet: 1 tab(s) orally 3 times a day as needed for back pain fluconazole 200 mg oral tablet: 1 tab(s) orally once a day furosemide 40 mg oral tablet: 1 tab(s) orally once a day gabapentin 100 mg oral capsule: 3 cap(s) orally 3 times a day metoprolol succinate 50 mg oral tablet, extended release: 3 tab(s) orally once a day omeprazole 20 mg oral delayed release tablet: 1 tab(s) orally once a day Plavix 75 mg oral tablet: 1 tab(s) orally once a day rosuvastatin 10 mg oral tablet: 1 tab(s) orally once a day sertraline 25 mg oral tablet: 1 tab(s) orally once a day (at bedtime)   11/15  Patient with wounds to the toes. Recommend ER admission (patient refusing). Recommend LE arterial duplex and CHER this week. Podiatry evaluation this week.  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 Since last visit he was hospitalized for new onset Afib and anemia, course complicated acute gout flare, neutropenic fever, transaminitis, and JUAN RAMON on CKD.  Since his discharge from the hospital, he has had two vagal episodes with Afib RVR which is now being controlled by Metoprolol.  On Procrit for anemia.  Currently denies C/P or SOB, at rest or on ambulation.  Denies recent LOC. Reports history of LE edema which is improved with elevation. No lower extremity wounds. Uses a walker for ambulation.  Zio patch 2/17 showed: 8 runs SVT, longest 16 beats, frequent PVCs 5.5%, ventricular bigeminy and trigeminy.  LE venous duplex in 2/2023 showed no DVT.  TTE in 1/2023 showed: 1. Mitral annular calcification, otherwise normal mitral valve. Mild-moderate mitral regurgitation. 2. Calcified trileaflet aortic valve with decreased opening. Peak transaortic valve gradient equals 29 mm Hg, mean transaortic valve gradient equals 20 mm Hg, estimated aortic valve area equals 1.4 sqcm (by continuity equation), aortic valve velocity time integral equals 59 cm, consistent with moderate aortic stenosis. Minimal aortic regurgitation.  3. Normal left ventricular internal dimensions and wall thicknesses. 4. Normal left ventricular systolic function. No segmental wall motion abnormalities. 5. Normal right ventricular size and function.  Last Cr. 1.8 on 10/2 (improved from prior) Last Hgb 10.1  Reports portacath being placed tomorrow for treatment of MDS.  Medications: Patient unaware to verify medications   2/27/23  Daughter called and noted patient with a second syncopal episode while on the toilet. No head injury. Currently VSS. Currently asymptomatic. Recommend ER admission.   2/17/2023 He was having a blood transfusion with hematology end of jan Was noted to have a fast heart rate Was admitted to [facility], for 1 week Had 3-4 blood transfusions there. ? afib  Meds: Allopurinol calcitriol coreg 6.25mg BID Cilostazol 100mg BID Colchicine 0.6 Folic acid Protonix senna   Aspirin and plavix stopped norvasc stopped  crestor stopped   4/6/2022 Recent admission - vertigo  Labs with Dr. Reddy - Creatinine is  2.18, Sodium 140 Hemoglobin was 8 *** (was anemic in hospital as well) BNP was 772 Echo Dec 2021:  mild AS.  normal LV function .  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 Has a 24 hour aid name is Trinity. Balance is ok, walks with a walker No falls Eating ok, no more vomiting  No wounds on the feet or toes.  No blood in the stool or the urine. He does not take iron he remains on aspirin and plavix.   10/6/2021  He is going for accupuncture it is helping somewhat Seen by Dr. Medel - he has spinal dz L3-S1 Undergoing accupunture no PT - he declined.  Otherwise he has no chest pain Breathing ok . Diuretic every other day, helps with leg swelling no wounds on the feet or toes.  Mild edema of the legs  Labs :  hemoglobin 11.1 (previously 9.9) Creatinine 2.10 (previously 2.5)     6/30/2021 Complains of calf pain with walking R hip pains Pins and needs bottom of right foot most recent creatinine is 2.39 He remains on cilostazol 50mg BID He remains on plavix BP is well controlled The left leg is not as bad as the right Complains of some ankle pains No open wounds or tissue loss on the feet.  No chest pain  no chest pressure.    3/31/2021  He had R Radial cath  of the RCA otherwise 60% stenosis No chest pain  no angina he has mild to mod claudcation both legs, no rest pain.  not really active.  Will medically managae  Seen by Mohsen Cabral nephrology, all stable.    Medications: Amlodipine 5 mg Aspirin 81 Calcitriol  Coreg 2 5mg BID Plavix 75 mg daily  Colchicine 0.6 mg daily for gout.   Crestor 10 mg daily  Lasix 40mg every other day     BP at home is 160-170 at home.  His BP here is 148/75  He complains of leg pains, both, entire legs, with walking.     Furosemide 40 mg every other day    1/15/2021 Discussed arterial duplex results with patient, diffuse disease noted. [place] Class 3 claudication. Also reporting bilateral healed / healing sores. Plans for labs and office visit next week. Decision if to proceed with peripheral angiogram on office visit.  Call office if any worsening symptoms occurring.   Here for followup  12/2/2020  Had followed up with nephrology Dr. Cabral 2 weeks ago All good.  Had labwork done, states all is ok Needs tingling of the right leg, bottom of the foot Hard to move it  sometimes Not worse with walking he gets tired with walking.  The right is worse than the left.    Urinating no problems

## 2024-03-27 NOTE — ASSESSMENT
[FreeTextEntry1] : Assessment: 1. Renal artery stenosis -s/p L RA stent 2. HTN 3. CKD stage 3-4 4. PAD 5. CAD 6. History of Afib 7. MDS 8. Lower extremity wounds. 9. Recent admission for HFrEF  Plan: 1. LE arterial duplex to assess PAD in setting of lower extremity wounds. 2. Continue Lasix. 3. Continue antiplatelet therapy and statin. 4. Continue excellent care with Heme. 5. Return in 6 -8 weeks.  6. Due to wall motion abnormalities on TTE and reduced EF, will consider LHC later in time, will hold off for now due to CKD and he is asymptomatic from a cardiac perspective.  7. Notify office if progression of foot wounds and R shin wound (non-healing).     I, Dr. Gabriel Reddy, personally performed the evaluation and management (E/M) services for this established patient who presents today.  That E/M includes conducting the examination, assessing all new/exacerbated conditions, and establishing a new plan of care.  Today, my ACP, Sylvia Rincon, was here to observe my evaluation and management services for this new problem/exacerbated condition to be followed going forward.

## 2024-03-29 ENCOUNTER — APPOINTMENT (OUTPATIENT)
Dept: HEMATOLOGY ONCOLOGY | Facility: CLINIC | Age: 84
End: 2024-03-29

## 2024-03-29 ENCOUNTER — APPOINTMENT (OUTPATIENT)
Dept: INFUSION THERAPY | Facility: HOSPITAL | Age: 84
End: 2024-03-29

## 2024-04-02 ENCOUNTER — APPOINTMENT (OUTPATIENT)
Dept: INFUSION THERAPY | Facility: HOSPITAL | Age: 84
End: 2024-04-02

## 2024-04-02 ENCOUNTER — APPOINTMENT (OUTPATIENT)
Dept: HEMATOLOGY ONCOLOGY | Facility: CLINIC | Age: 84
End: 2024-04-02

## 2024-04-05 ENCOUNTER — APPOINTMENT (OUTPATIENT)
Dept: HEMATOLOGY ONCOLOGY | Facility: CLINIC | Age: 84
End: 2024-04-05

## 2024-04-05 ENCOUNTER — APPOINTMENT (OUTPATIENT)
Dept: INFUSION THERAPY | Facility: HOSPITAL | Age: 84
End: 2024-04-05

## 2024-04-06 ENCOUNTER — APPOINTMENT (OUTPATIENT)
Dept: ULTRASOUND IMAGING | Facility: CLINIC | Age: 84
End: 2024-04-06
Payer: MEDICARE

## 2024-04-06 ENCOUNTER — OUTPATIENT (OUTPATIENT)
Dept: OUTPATIENT SERVICES | Facility: HOSPITAL | Age: 84
LOS: 1 days | End: 2024-04-06
Payer: MEDICARE

## 2024-04-06 DIAGNOSIS — Z86.79 PERSONAL HISTORY OF OTHER DISEASES OF THE CIRCULATORY SYSTEM: Chronic | ICD-10-CM

## 2024-04-06 DIAGNOSIS — Z98.49 CATARACT EXTRACTION STATUS, UNSPECIFIED EYE: Chronic | ICD-10-CM

## 2024-04-06 DIAGNOSIS — Z98.890 OTHER SPECIFIED POSTPROCEDURAL STATES: Chronic | ICD-10-CM

## 2024-04-06 DIAGNOSIS — Z90.79 ACQUIRED ABSENCE OF OTHER GENITAL ORGAN(S): Chronic | ICD-10-CM

## 2024-04-06 DIAGNOSIS — I73.9 PERIPHERAL VASCULAR DISEASE, UNSPECIFIED: ICD-10-CM

## 2024-04-06 PROCEDURE — 93925 LOWER EXTREMITY STUDY: CPT | Mod: 26

## 2024-04-06 PROCEDURE — 93925 LOWER EXTREMITY STUDY: CPT

## 2024-04-08 ENCOUNTER — APPOINTMENT (OUTPATIENT)
Dept: HEMATOLOGY ONCOLOGY | Facility: CLINIC | Age: 84
End: 2024-04-08
Payer: MEDICARE

## 2024-04-08 ENCOUNTER — RESULT REVIEW (OUTPATIENT)
Age: 84
End: 2024-04-08

## 2024-04-08 ENCOUNTER — APPOINTMENT (OUTPATIENT)
Dept: INFUSION THERAPY | Facility: HOSPITAL | Age: 84
End: 2024-04-08

## 2024-04-08 LAB
ALBUMIN SERPL ELPH-MCNC: 4.6 G/DL — SIGNIFICANT CHANGE UP (ref 3.3–5)
ALP SERPL-CCNC: 108 U/L — SIGNIFICANT CHANGE UP (ref 40–120)
ALT FLD-CCNC: <5 U/L — LOW (ref 10–45)
ANION GAP SERPL CALC-SCNC: 14 MMOL/L — SIGNIFICANT CHANGE UP (ref 5–17)
AST SERPL-CCNC: 16 U/L — SIGNIFICANT CHANGE UP (ref 10–40)
BASOPHILS # BLD AUTO: 0.09 K/UL — SIGNIFICANT CHANGE UP (ref 0–0.2)
BASOPHILS # BLD AUTO: 0.11 K/UL — SIGNIFICANT CHANGE UP (ref 0–0.2)
BASOPHILS NFR BLD AUTO: 2.4 % — HIGH (ref 0–2)
BASOPHILS NFR BLD AUTO: 3.1 % — HIGH (ref 0–2)
BILIRUB SERPL-MCNC: 0.6 MG/DL — SIGNIFICANT CHANGE UP (ref 0.2–1.2)
BUN SERPL-MCNC: 20 MG/DL — SIGNIFICANT CHANGE UP (ref 7–23)
CALCIUM SERPL-MCNC: 9.1 MG/DL — SIGNIFICANT CHANGE UP (ref 8.4–10.5)
CHLORIDE SERPL-SCNC: 100 MMOL/L — SIGNIFICANT CHANGE UP (ref 96–108)
CO2 SERPL-SCNC: 26 MMOL/L — SIGNIFICANT CHANGE UP (ref 22–31)
CREAT SERPL-MCNC: 1.9 MG/DL — HIGH (ref 0.5–1.3)
EGFR: 35 ML/MIN/1.73M2 — LOW
EOSINOPHIL # BLD AUTO: 0.05 K/UL — SIGNIFICANT CHANGE UP (ref 0–0.5)
EOSINOPHIL # BLD AUTO: 0.06 K/UL — SIGNIFICANT CHANGE UP (ref 0–0.5)
EOSINOPHIL NFR BLD AUTO: 1.4 % — SIGNIFICANT CHANGE UP (ref 0–6)
EOSINOPHIL NFR BLD AUTO: 1.6 % — SIGNIFICANT CHANGE UP (ref 0–6)
GLUCOSE SERPL-MCNC: 125 MG/DL — HIGH (ref 70–99)
HCT VFR BLD CALC: 32.5 % — LOW (ref 39–50)
HCT VFR BLD CALC: 33.4 % — LOW (ref 39–50)
HGB BLD-MCNC: 11.2 G/DL — LOW (ref 13–17)
HGB BLD-MCNC: 11.4 G/DL — LOW (ref 13–17)
IMM GRANULOCYTES NFR BLD AUTO: 1.9 % — HIGH (ref 0–0.9)
IMM GRANULOCYTES NFR BLD AUTO: 2 % — HIGH (ref 0–0.9)
LDH SERPL L TO P-CCNC: 205 U/L — SIGNIFICANT CHANGE UP (ref 50–242)
LYMPHOCYTES # BLD AUTO: 1.22 K/UL — SIGNIFICANT CHANGE UP (ref 1–3.3)
LYMPHOCYTES # BLD AUTO: 1.29 K/UL — SIGNIFICANT CHANGE UP (ref 1–3.3)
LYMPHOCYTES # BLD AUTO: 33.1 % — SIGNIFICANT CHANGE UP (ref 13–44)
LYMPHOCYTES # BLD AUTO: 36.4 % — SIGNIFICANT CHANGE UP (ref 13–44)
MCHC RBC-ENTMCNC: 34.1 G/DL — SIGNIFICANT CHANGE UP (ref 32–36)
MCHC RBC-ENTMCNC: 34.5 G/DL — SIGNIFICANT CHANGE UP (ref 32–36)
MCHC RBC-ENTMCNC: 38.1 PG — HIGH (ref 27–34)
MCHC RBC-ENTMCNC: 38.3 PG — HIGH (ref 27–34)
MCV RBC AUTO: 110.5 FL — HIGH (ref 80–100)
MCV RBC AUTO: 112.1 FL — HIGH (ref 80–100)
MONOCYTES # BLD AUTO: 0.24 K/UL — SIGNIFICANT CHANGE UP (ref 0–0.9)
MONOCYTES # BLD AUTO: 0.25 K/UL — SIGNIFICANT CHANGE UP (ref 0–0.9)
MONOCYTES NFR BLD AUTO: 6.8 % — SIGNIFICANT CHANGE UP (ref 2–14)
MONOCYTES NFR BLD AUTO: 6.8 % — SIGNIFICANT CHANGE UP (ref 2–14)
NEUTROPHILS # BLD AUTO: 1.78 K/UL — LOW (ref 1.8–7.4)
NEUTROPHILS # BLD AUTO: 2 K/UL — SIGNIFICANT CHANGE UP (ref 1.8–7.4)
NEUTROPHILS NFR BLD AUTO: 50.3 % — SIGNIFICANT CHANGE UP (ref 43–77)
NEUTROPHILS NFR BLD AUTO: 54.2 % — SIGNIFICANT CHANGE UP (ref 43–77)
NRBC # BLD: 0 /100 WBCS — SIGNIFICANT CHANGE UP (ref 0–0)
NRBC # BLD: 0 /100 WBCS — SIGNIFICANT CHANGE UP (ref 0–0)
PLATELET # BLD AUTO: 299 K/UL — SIGNIFICANT CHANGE UP (ref 150–400)
PLATELET # BLD AUTO: 301 K/UL — SIGNIFICANT CHANGE UP (ref 150–400)
POTASSIUM SERPL-MCNC: 3.6 MMOL/L — SIGNIFICANT CHANGE UP (ref 3.5–5.3)
POTASSIUM SERPL-SCNC: 3.6 MMOL/L — SIGNIFICANT CHANGE UP (ref 3.5–5.3)
PROT SERPL-MCNC: 7.5 G/DL — SIGNIFICANT CHANGE UP (ref 6–8.3)
RBC # BLD: 2.94 M/UL — LOW (ref 4.2–5.8)
RBC # BLD: 2.98 M/UL — LOW (ref 4.2–5.8)
RBC # FLD: 18.5 % — HIGH (ref 10.3–14.5)
RBC # FLD: 18.6 % — HIGH (ref 10.3–14.5)
SODIUM SERPL-SCNC: 140 MMOL/L — SIGNIFICANT CHANGE UP (ref 135–145)
WBC # BLD: 3.54 K/UL — LOW (ref 3.8–10.5)
WBC # BLD: 3.69 K/UL — LOW (ref 3.8–10.5)
WBC # FLD AUTO: 3.54 K/UL — LOW (ref 3.8–10.5)
WBC # FLD AUTO: 3.69 K/UL — LOW (ref 3.8–10.5)

## 2024-04-08 PROCEDURE — 99213 OFFICE O/P EST LOW 20 MIN: CPT

## 2024-04-08 RX ORDER — METOPROLOL SUCCINATE 50 MG/1
50 TABLET, EXTENDED RELEASE ORAL DAILY
Qty: 90 | Refills: 3 | Status: COMPLETED | COMMUNITY
Start: 2021-02-03 | End: 2024-04-08

## 2024-04-09 ENCOUNTER — APPOINTMENT (OUTPATIENT)
Dept: INFUSION THERAPY | Facility: HOSPITAL | Age: 84
End: 2024-04-09

## 2024-04-09 DIAGNOSIS — Z94.84 STEM CELLS TRANSPLANT STATUS: ICD-10-CM

## 2024-04-09 DIAGNOSIS — Z51.11 ENCOUNTER FOR ANTINEOPLASTIC CHEMOTHERAPY: ICD-10-CM

## 2024-04-09 DIAGNOSIS — Z51.89 ENCOUNTER FOR OTHER SPECIFIED AFTERCARE: ICD-10-CM

## 2024-04-09 DIAGNOSIS — R11.2 NAUSEA WITH VOMITING, UNSPECIFIED: ICD-10-CM

## 2024-04-09 NOTE — RESULTS/DATA
[FreeTextEntry1] : 4/8/2024 WBC 3.69 Hgb 11.4 Platelet 301 Normal LDH CMP w/ Cr 1.9, eGFR 35   3/13/2024 WBC 3.05 Hgb 11.6 Platelets 265 Normal LDH and UA Cr 1.99, eGFR 33  2/12/2024  HGB 11.3   WBC 3.19  BONE MARROW  	 Patient:     MASOOD MACARIO   Accession:                             [id number]  Collected Date/Time:                   2/2/2024 11:08 EST Received Date/Time:                    2/3/2024 11:15 EST  Hematopathology Report - Auth (Verified)  Specimen(s) Submitted 1. Bone marrow biopsy MH 2. Bone marrow aspirate for Flow OP  Final Diagnosis 1, 2. Bone marrow biopsy and bone marrow aspirate      - Cellular bone marrow with   erythroid predominant  trilineage  hematopoiesis, reduced  myelopoiesis and  adequate  megakaryopoiesis See note and description.  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  Current biopsy shows cellular bone marrow with    erythroid predominant  trilineage  hematopoiesis, reduced  myelopoiesis and adequate  megakaryopoiesis  with few small  hypolobated forms. Aspirate smears show  dysplastic  features in erythroid  elements  including nuclear irregularities, budding and   megloblastoid  changes.  Suggest correlation with pending   cytogenetics , FISH and myeloid  NGS panel. Comprehensive report with results of pending ancillary studies to follow.  Ancillary studies Bone marrow aspirate iron stain:   Iron stores are increased; ring  sideroblasts are not increased. Flow  cytometry ([id number]):     -  The lymphocyte   immunophenotypic findings show no diagnostic abnormalities.     -  Myeloblasts (0.65% of cells), positive for   myeloperoxidase , HLA-DR, CD34, , CD33, CD13, partial dim CD7; negative for CD15, CD16, CD64, CD2, CD4.      - The  myeloid immunophenotypic  findings show normal  myeloid granularity, no increase in   myeloid immaturity, and normal  myeloid antigen maturation pattern, and the presence of    hematogones (which are reported to be low in   myelodysplasia). Immunohistochemical  stains:  Immunostains CD34, ,  Myeloperoxidase , CD15, CD71, E- cadherin , CD61, Factor VIII, p53 are performed on block 1A. CD34 stains less than 1% cells.  stains increased scattered mast cells.  Myeloperoxidase , CD15 highlight  myeloid elements. CD71 stains many clusters of   erythroid elements and show  erythroid predominance. E- cadherin  stains few pronormoblasts . CD61, factor VIII stains  megakaryocytes  and highlight small  hypolobated forms. p53 (dim) stains few scattered cells. Cytogenetics : Pending FISH:  Pending  Microscopic description: 1. Biopsy:  Sections of bone marrow biopsy and bone marrow fragments in clot show variable  cellularity  (5% -50% cellularity ) in a fragmented and hemorrhagic core biopsy,   erythroid predominant  trilineage hematopoiesis with maturation, reduced   myelopoiesis. M:E ratio is reduced.  Megakaryocytes  are normal in number with occasional small  hypolobated forms. Iron stores are increased.  2. Aspirate:  Spicular  and cellular; adequate for interpretation. Maturing and mature  myeloid and  erythroid elements are present.  Erythroid  elements are markedly increased.   Erythroid elements show progressive maturation with  dysplastic features including nuclear irregularities, budding and  megaloblastoid changes. M:E ratio (0.3:1) is reduced.    Megakaryocytes appear normal in number and occasional small forms are present. Many scattered mast cells are present.  	 Flow Cytometry Final Report ________________________________________________________________________ Specimen: Bone marrow aspirate Date Collected: 02/02/2024 Date Received: 02/02/2024 Date Processed: 02/02/2024 Date Reported: 02/07/2024 16:45 Accession #: [id number] ________________________________________________________________________ CLINICAL DATA: Clinical history of myelodysplastic syndrome, rule out acute myeloid leukemia  ________________________________________________________________________ CD45/Side Scatter Differential Granulocytes: 45 % ;    Lymphocytes: 29 % ;    Monocytes: 4 % ;    Dim CD45 and/or blast gate: 5 % ;    Erythroid/debris: 15 % ________________________________________________________________________ DIAGNOSIS: Bone marrow aspirate:     -  The lymphocyte immunophenotypic findings show no diagnostic abnormalities.     - Myeloblasts (0.65% of cells), positive for myeloperoxidase, HLA-DR, CD34, , CD33, CD13, partial dim CD7; negative for CD15, CD16, CD64, CD2, CD4.      - The myeloid immunophenotypic findings show normal myeloid granularity, no increase in myeloid immaturity, and normal myeloid antigen maturation pattern, and the presence of hematogones (which are reported to be low in myelodysplasia). Please see interpretation.  INTERPRETATION: MORPHOLOGY: Maturing trilineage hematopoiesis CYTOSPIN: Maturing and mature myeloid elements with no significant lymphocytosis or immaturity; pronormoblasts present.  IMMUNOPHENOTYPE: Lymphocytes (29% of cells): Heterogeneous population of T-cells (with normal CD4 to CD8 ratio, including CD57+ natural killer-like T-cells, gamma/delta T-cells), natural killer cells, and polytypic B-cells. The lymphocyte immunophenotypic findings show no diagnostic abnormalities.  Myeloblasts (0.65% of cells), positive for myeloperoxidase, HLA-DR, CD34, , CD33, CD13, partial dim, CD7; negative for CD15, CD16, CD64, CD2, CD4.  CD45/side scatter shows 0.65% myeloblast population and normal myeloid granularity. There is no increase in CD34, CD14/CD64 or  positive cells. Myeloid antigen pattern is normal with CD13, CD16, CD11b, CD15, and CD33. Granulocytes express CD56.  Hematogones are present. The myeloid immunophenotypic findings show normal myeloid granularity, no increase in myeloid immaturity, and normal myeloid antigen maturation pattern, and the presence of hematogones (which are reported to be low in myelodysplasia). _____________________________________________________________________  PATHOLOGY: MOLECULAR   [facility] Advanced NGS Myeloid Panel Final Report  RESULT SUMMARY: ABNORMAL  DETECTED GENOMIC ALTERATIONS:   Tier I: Variants of Strong Clinical Significance   SF3B1 p.[id number]   Tier II: Variants of Potential Clinical Significance   DNMT3A p.?   Tier III: Variants of Unknown Clinical Significance   KIT p.[id number]   12/7/2023 HGB 10.4 WBC 3.47    9/18/2023  (Before and after stopping Revlimid)  WBC 1.14 > 6.08 Hgb 7.3  > 8.8  > 210  8/9/2023 Most recent blood work from 8/9/2023 WBC 1.14  Hgb 7.3     7/2/2023 Hgb 9.9, .4 WBC platelet normal  Cr 2.3 eGFR 28  Hypokalemic on 6/28 3.3  5/22/2023 WBC and platelet WNL Hgb 11.1  .9 CMP from 5/4-  Cr 1.79, eGFR 38 Pending CMP LDH UA today  3/24/2023 WBC 6.28 Hgb 10.2 .7 Platelet 224 Last Cr (2/24/23) - 2.42 Pending repeat CBC, CMP, LDH, UA  2/24/2023 WBC 2.62 Hgb 8.8 Platelet 128 Pending CMP, LDH, UA  1/26/2023 Last Hgb 9.1, Cr 2.3 (12/16/2022)    12/16/2022 HGB on 10/31/2022; 8.3,  Bone marrow biopsy:  Patient:   MASOOD MACARIO   Accession:                             [id number]  Collected Date/Time:                   10/31/2022 16:49 EDT Received Date/Time:                    10/31/2022 16:50 EDT  Hematopathology Addendum Report - Auth (Verified)  Hematopathology Addendum Additional immunohistochemical stains (block 1A: p53, ) show  increased  positive interstitial mast cells without aggregates. TP53  shows less than 1% bright positive cells.  There is no change in the diagnosis.  Verified by: Brooklynn Erazo (Electronic Signature) Reported on: 11/17/22 09:24 EST, [facility], [address] [place] [address]. [place] [address] Phone: [phone]   Fax: [phone] _________________________________________________________________  Disclaimer Slide(s) with built in immunohistochemical study control(s) and negative  control associated with this case has/have been verified by the sign out  pathologist.  For slide(s) without built in controls positive control slides has/have  been reviewed and approved by immunohistochemistry lab  These immunohistochemical/ in-situ hybridization tests have been developed  and their performance characteristics determined by [facility], Department of Pathology, Division of Immunopathology,  [address] [place] [address].  It has not been cleared  or approved by the U.S. Food and Drug Administration.  The FDA has  determined that such clearance or approval is not necessary.  This test  is used for clinical purposes.  The laboratory is certified under the  CLIA-88 as qualified to perform high complexity clinical testing. Hematopathology Addendum Report - Auth (Verified)  Hematopathology Addendum       Comprehensive Hematopathology Report  Final Diagnosis : 1, 2. 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 The bone marrow  shows hypercellularity with erythroid hyperplasia and increased ring  sideroblasts and dysplastic megakaryocytosis. Interstitial mast cells  are increased with normal morphology, few CD25 positive, negative CD30.  The findings show myelodysplastic syndrome with multilineage dysplasia  (hypolobulated megakaryocytes) and ring sideroblasts. Correlation with  cytogenetics, FISH, and somatic mutation analysis to evaluate for complex  karyotype, del(5q), -7, del(7q) SF3B1, TP53, other abnormalities is  done. Please note findings of an   abnormal male karyotype, 46,XY,del(5)(q15q33) [13]/46,XY[7],  a positive MDS FISH panel and [facility] Advanced [facility]  Myeloid Report showing   Tier I: Variants of Strong Clinical Significance:  SF3B1 p.Ryy983Vro (VAF: 39%),   Tier II: Variants of Potential Clinical   Significance: DNMT3A p.?  (VAF: 4%), Tier III: Variants of Unknown  Clinical Significance:   KIT p.Koa782Hsd  (VAF: 50%) . Based on the  additional findings, the MDS classification (ICC) is MDS with del(5q) and  with WHO is MDS with low blasts and 5q deletion.  Dr. Mcmahon was notified of the diagnosis on 11/07/2022.  Morphology: Microscopic description: 1. Biopsy:   Sections of bone marrow biopsy and bone marrow fragments in  clot show hypercellularity (50-85% cellularity), erythroid predominance  with maturation and increased pronormoblasts, maturing and mature  myeloid elements, megakaryocytes at least normal in number with abnormal  morphology (increased hypolobulated/small forms), increased interstitial  mast cells without aggregates, and iron stores increased.  2. Aspirate:   Cellular spicules are present, adequate for interpretation.   Maturing and mature myeloid and erythroid elements are present with  erythroid predominance (M:E ratio (1.16:1).  Megakaryocytes appear at  least normal in number with small/hypolobulated morphology. Mast cells  are increased in the spicules (round and normally granular).  Bone Marrow Aspirate Differential: (100 Cells). Type  % Normal* Blast  0% 0-3 Neutrophil and    Precursors   47% 33-63 Eosinophil  3% 1-5 Basophil  0% 0-1 Pronormoblast  5% 0-2 Normoblast  43% 15-25 Monocyte  0% 0-2 Lymphocyte  7% 10-15 Plasma cell  0% 0-1   *Adult Range  Comment Iron stain (examined to evaluate for iron stores; see microscopic  description) and Giemsa stain (shows appropriate staining pattern) are  performed and evaluated on block(s): 1A, 1B  Ancillary Studies: Bone marrow aspirate iron stain:   Iron stores are increased; numerous ring  sideroblasts are present (greater than 15%).  Flow cytometry:   The myeloid immunophenotypic findings show decreased  myeloid granularity, no increase in myeloid immaturity (myeloblasts,  0.32% of cells, with normal immunophenotype), normal myeloid antigen  maturation pattern, few mast cells, and the presence of hematogones  (which are reported to be low in myelodysplasia). The lymphocyte immunophenotypic findings show no diagnostic abnormalities.  Immunohistochemical stains   (block 1A: CD34, , myeloperoxidase,  CD71, E-cadherin, factor VIII, CD15, CD61, CD25, CD30, p53) show no  increase in CD34 positive cells (less than 3%), erythroid predominance  (positive with CD71), with clusters of pronormoblasts (positive with  E-cadherin); diminished myeloid elements with maturation (positive   with myeloperoxidase and CD15), and increased megakaryocyte number with  dysplastic, small/hypolobulated morphology (positive with factor VIII,  CD61). CD30 is negative in mast cells; a few show dim CD25 positivity.  Cytogenetics:  [id number] Date Collected:  10/31/2022 Result:  Abnormal male karyotype Karyotype: 46,XY,del(5)(q15q33)[13]/46,XY[7]  Fluorescence in situ Hybridization (FISH):    [id number] Result: ABNORMAL FISH MDS PANEL - 5q deletion detected (65%) Probe(s) and Location(s):   J4N961/ D5S23 (5p15.2), EGR1 (5q31), D7Z1  (7p11.1-q11.1), L7L118 (7q31), CEP-8/D8Z2   ? (8p11.1-q11.1), A71K469  (20q12) ISCN Nomenclature:  nuc clif(G1B471/U7G95j2,EGR1x1)[130/200],  (D7Z1,F9H419)x2[200], (D8Z2,W50W832)x2[197/200], (TP53x2)[197/200]  Molecular Analyses: [facility] Advanced NGS Myeloid Report Specimen ID:  [id number] Date Collected:  10/31/2022 Specimen Source:  Bone Marrow  RESULT SUMMARY:  ABNORMAL DETECTED GENOMIC ALTERATIONS: Tier I: Variants of Strong Clinical Significance SF3B1 p.[id number] Tier II: Variants of Potential Clinical Significance DNMT3A p.? Tier III: Variants of Unknown Clinical Significance KIT p.Jsp821Coo  PERTINENT NEGATIVE RESULTS: The following genes are NEGATIVE for clinically relevant mutations.  Mutational hotspots and surrounding exonic regions were interrogated for  DNA level point mutations and indels (fusions not assayed). ABL1, ANKRD26, ASXL1, ATRX, BCOR, BCORL1, BRAF, CALR, CBL, CCND2, CDKN2A,  CEBPA, CSF3R, CUX1, DDX41, ETNK1, ETV6, EZH2, FBXW7, FLT3, GATA2, HRAS,  IDH1, IDH2, JAK2, KDM6A, KMT2A, KRAS, MAP2K1, MPL, MYD88, NF1, NPM1,  NRAS, PDGFRA, PHF6, PTEN, PTPN11, RUNX1, SETBP1, SRSF2, STAG2, TET2,  TP53, U2AF1, WT1, ZRSR2 TECHNICAL SUMMARY : DNMT3A p.? c.2174-1G>A 4% allele frequency Exon 19 [id number]  SF3B1 p.Ihl308Odm c.1998G>C 39% allele frequency Exon 14 [id number]_012433.3   KIT p.Bpi448Yjf c.1879C>T 50% allele frequency Exon 12 [id number]_000222.2  Clinical History/Data  : History of macrocytic anemia with CKD rule out primary bone marrow  disorder  CBC, 10/31/2022  Test Code       Result         Reference                  Range WBC             5.75 K/uL      3.80 - 10.50 RBC             2.34 M/uL L    4.20 - 5.80 HGB             8.3 g/dL L     13.0 - 17.0 HCT             25.4 % L       39.0 - 50.0 MCV             108.5 fl H     80.0 - 100.0 MCH             35.5 pg H      27.0 - 34.0 MCHC            32.7 g/dL      32.0 - 36.0 RDW             18.5 % H       10.3 - 14.5 PLT             236 K/uL       150 - 400 Auto NRBC       0 /100 WBCs    0 - 0 NEUT%           63.5 %         43.0 - 77.0  NEUT#           3.65 K/uL      1.80 - 7.40 LYMPH%          20.5 %         13.0 - 44.0 LYMPH#          1.18 K/uL      1.00 - 3.30 MONO%           7.0 %          2.0 - 14.0 MONO#           0.40 K/uL      0.00 - 0.90 EOS%            3.3 %          0.0 - 6.0 EOS#            0.19 K/uL      0.00 - 0.50 BASO%           1.4 %          0.0 - 2.0 BASO#           0.08 K/uL      0.00 - 0.20 BUN             28 mg/dL H     7 - 23 Creatinine      2.32 mg/dL H   0.50 - 1.30  Verified by: Brooklynn Erazo (Electronic Signature) Reported on: 11/09/22 16:40 [facility], [facility], [address]  [facility]. [facility] [address], [place] [address] Phone: [phone]   Fax: [phone]  10/27/2022 (Labs on 10/18/2022): Hgb stable at 9.0, normal WBC, platelets Creatinine 2.32 eGFR 28   5/12/2022 Available labs reviewed.  Macrocytic anemia, no etiology could be determined.

## 2024-04-09 NOTE — PHYSICAL EXAM
[Ambulatory and capable of all self care but unable to carry out any work activities] : Status 2- Ambulatory and capable of all self care but unable to carry out any work activities. Up and about more than 50% of waking hours [Normal] : affect appropriate [de-identified] : seen in wheelchair [de-identified] : afib rate controlled  [de-identified] : unsteady gait, 2 ppl assist

## 2024-04-09 NOTE — ASSESSMENT
[FreeTextEntry1] : 83-year-old Mr. MACARIO is seen in follow up for macrocytic anemia (initial evaluation: HGB ~9.5, MCV ~125), WBC and platelet counts are preserved. Patient now has some symptoms of anemia. 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 He was started on Revlimid in December but was unable to tolerate due to multiple side effects. Recent hospitalization for new onset afib + anemia. Course c/b acute gout flare, neutropenic fever, transaminitis, and JUAN RAMON on CKD. Since his discharge from the hospital, he has had two vagal episodes with afib RVR which is now being controlled by 25mg Metoprolol BID. Clinically, he is responding well to the Procrit injections with his Hgb ~10 and appears with more energy and strength. Restarted Revlimid 5mg daily, tolerating well. Weekly CBC and CMP to monitor his blood counts as well as liver/kidney function. Continue Procrit injections weekly to maintain Hgb > 11. 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 along with primary bone marrow disorder - Bone marrow biopsy: MDS with 5q deletion (65% cells) and MDS-RS with SF3B1 (39% allele frequency) - Discussed and reviewed bone marrow findings with the patient - Started on Revlimid 5 mg daily (recommended dose 10 mg) and held in January 2023 due to cytopenias req hospitalization - Started 20,000U Procrit injections since February with great response (Hgb 10+)  - Restarted Revlimid 5 mg daily (June 2023), tolerating well but cytopenia worsened - Increase Procrit to 40,000 units QW (8/10/2023); hold for Hgb > 11 - Held Revlimid as of today (8/10/2023) for worsening pancytopenia - Cytopenia including HGB improved - Discussed and planned to start HMA (S/Q) - Patient consented (9/18/2023)  - C1D1 on 10/1/2023, C2 D1 on 10/30/2023 - Admitted to hospital for sepsis, Cycle 3 delayed - Last transfusion was in 11/2023 - Post C4 bone marrow: Discussed; response noted; Tri-lineage hematopoiesis, no increase in blasts  - He is cleared for the C7 treatment today - Transfuse for Hgb < 8 (if symptomatic) and platelets < 20k  - Home draw labs on Mondays and Thursdays for poss platelet transfusion on Tuesdays and Fridays - So far has not required transfusions since starting Dacogen tx - C/w trending CBC, CMP, LDH weekly home draws (order in)  - Self administer 40k U Procrit when Hgb < 11 - HOLD FOR HGb > 11****  [ ] Afib, rate controlled, resolved - New onset afib - Likely multifactorial etiology of dehydration and anemia - [id number] score = 5 - On 6.25mg of Carvedilol - Cardiology held ASA and Plavix due to anemia - Evaluated by cards and started on Metoprolol - Now afib rate controlled  [ ] Gout, resolved - C/w Allopurinol and Colchicine - Right toe without S&S of infection - Continue Allopurinol - Continue f/u with rheum  [ ] Back pain - Last MRI in 2021 with lumbar spondylosis, with spinal canal narrowing - Left flank pain may be r/t the stenosis causing pinched nerve pains - Currently on Gabapentin 200mg daily - Increased to Gabapentin 300mg TID - Repeat MRI with no contrast (6/20) - chronic lumbar spondylosis - Recommended spine specialist - pt deferred at this time and will f/u w/ rheum  RTC in 1 month before C8 treatment  Case and management discussed wIsabel Mcmahon

## 2024-04-09 NOTE — HISTORY OF PRESENT ILLNESS
[Treatment Protocol] : Treatment Protocol [de-identified] : CHART REVIEW: 81-year-old Mr. MACARIO is seen in consult for macrocytic anemia (Hgb 9.5, MCV ~125). Patient has multiple medical conditions including stage-3 CKD. He has no symptoms of anemia and has no complaints.  [FreeTextEntry1] : Dacogen q28 days. C1: 10/2/23 - 10/6/23. C2: 10/30/23 - 11/3/23. C3:12/11/23 - 12/15/23 (delayed; cancelled). C4: 1/8/24-1/12/24. C5: 2/12/24-2/16/24. C6: 3/11/23-3/15/24. C7: 4/8/24-4/12/24 [de-identified] : 05/12/22: 81 year-old Mr. MACARIO is seen in consult for macrocytic  anemia (Hgb 9.5, MCV ~125). Patient has multiple medical conditions including stage-3 CKD. He has no symptoms of anemia and has no complaints.   10/27/22: Patient presenting today for follow up for macrocytic anemia. Overall feels well. Endorses some fatigue and shortness of breath with activity. Denies headache, dizziness, Ambulates with walker. He is being followed by his nephrologist who manages his hypertension. Endorses taking diuretics every other day.    12/16/2022 Patient returns for a follow up. He endorses no change in his health status. 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 Patient has some symptoms of fatigue and tiredness.   1/26/2023 Patient returns for a follow up. He started taking Revlimid on 12/30/2022. About 2 weeks later he started developing side effects like-- loss  of appetite, body ache, constipation, pins and needle sensation. He also appears to have developed Jaundice.   2/24/2023 Patient seen in follow up, accompanied by Preeti ceja, at bedside. He had a recent hospitalization from 1/27 - 2/7 for new onset afib that was detected in the treatment room prior to his blood transfusion appointment. He was found to be in afib, anemic (Hgb 6.3) requiring 4 PRBCs throughout his stay. His course was c/b acute gout flare, neutropenic fever, transaminitis, and JUAN RAMON on CKD. 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 Had an episode of neutropenic fever started on Cefepime but stopped as per ID, most likely due to gout flare. UA + but asymptomatic, not treated. Bld cx neg x2. Syncopal episode while being on the commode yesterday 2/24. As per Preeti, EKG revealed rate controlled afib. Today, his HR was 127 in office. Denies dizziness, SOB, chest pain, palpitations. As per pt, was on a holter monitor that was submitted yesterday. He is f/u with Dr. Reddy (cards). He also have been having a dry cough for few days. + chills. No fever, body aches, night sweats. No sick contacts. Working with PT. Still feels weak, ambulating via wheelchair. Appetite is okay. Weight is stable.   3/24/2023 Patient seen in follow up. Accompanied by Preeti ceja at bedside. He has been responding well to 20k U Procrit weekly injections. Last Hgb 10.2, BP stable 111/58. Since his last visit, has been evaluated by cards for his afib (now rate controlled) currently on Metoprolol 25mg BID. He reports feeling much better since last encounter - he is now able to ambulate longer distances with a walker. Endorses unsteady gait (vertigo). Appetite is good, weight is stable.   5/22/2023 Patient seen in follow up. Accompanied by aide and daughter at bedside. Had a recent UTI, treated with Amoxicillin. He'll be following up with his PCP tomorrow. He endorses left flank pain that started after his UTI that is hindering him from ambulating. Describes the pain to be "shocking" and sharp in nature, pain reproduced by movements. His last MRI was in 2021 which revealed lumbar spondylosis with mild to moderate spinal canal narrowing. + peripheral neuropathy of his right lower ext. Currently on Gabapentin 200mg daily. His Hgb has been stable since April 27th. Last Hgb 11.1 last week. Otherwise, no complaints. Appetite is good and weight is stable.  7/5/2023 Patient seen in follow up. With aide and daughter at bedside. Interim, he's been feeling well. He completed first month of starting Revlimid without any side effects. His most recent CBC from last week 9.7, he plans on self administering Procrit today after the visit. He had MRI done 6/20 which showed degenerative vs inflammation of his lumbar spine. He continues to experience back pain and some scapular pain from pulled muscle last week. Recommended ortho / pain management for chronic back pain but pt denied at this time. He reports feeling weaker in his lower extremities but does not want to participate in PT at this time. Otherwise, feeling well. Appetite is good, weight is stable.   8/10/2023 Patient presents for a follow up. He appears pale. He feels weak and sleepy after taking the Lenalidomide pill. He is not doing any PT. He is not able to walk by himself yet. Hs anemia has become worse despite EUGENIA. Lenalidomide may not be helping to improve his disease but worsening the pancytopenia.    9/18/2023 Patient seen in follow up. He was taken off Revlimid in his last visit and his EUGENIA dose was increased to 40KU. His HGB increased to ~9g (from 7) and neutropenia and thrombocytopenia resolved. He looks and feels better. We discussed starting HMA.   12/7/2023 Patient presents for a follow up and evaluation for treatment. He completed 2 cycles of HMA (Dacogen). Before the 3rd cycle, he was admitted to the hospital for low O2Sat. He received IV abx for suspected sepsis. He was discharged a week ago. His physical condition has improved. His last transfusion (PRBC) was 3 weeks ago (11/2023). His Hgb is 10.4, platelets 175, WBC 3.47. He is continuing on Retacrit at home. He is holding his counts without transfusions. It appears that he is responding to the treatment.   2/12/2024  3/11/2024 Patient seen the tx room for a follow up appt. Today is C6D1. He is accompanied by Preeti, his aide and his son at bedside. Interim, he's been in good health. He is now able to climb up the stairs. He is slowly regaining his strength. He has been giving himself the Procrit despite his Hgb being > 11, we had a lengthy discussion again today going over the parameters for Procrit injections (ONLY ADMINISTER WHEN HGB < 11). The patient, as well as his aide and his son verbalized full understanding  4/8/2024 Patient seen in the tx room for a f/u. Today is C7D1 of Dacogen. He is accompanied by Preeti, his aide. Interim, he's been in good health. He has stopped his Procrit since his Hgb has been > 11. He reports that his legs feel weak and cannot climb the stairs well without falling. He plans to resume PT to regain his strength

## 2024-04-10 ENCOUNTER — APPOINTMENT (OUTPATIENT)
Dept: INFUSION THERAPY | Facility: HOSPITAL | Age: 84
End: 2024-04-10

## 2024-04-11 ENCOUNTER — RESULT REVIEW (OUTPATIENT)
Age: 84
End: 2024-04-11

## 2024-04-11 ENCOUNTER — APPOINTMENT (OUTPATIENT)
Dept: INFUSION THERAPY | Facility: HOSPITAL | Age: 84
End: 2024-04-11

## 2024-04-11 ENCOUNTER — APPOINTMENT (OUTPATIENT)
Dept: HEMATOLOGY ONCOLOGY | Facility: CLINIC | Age: 84
End: 2024-04-11

## 2024-04-11 LAB
BASOPHILS # BLD AUTO: 0.1 K/UL — SIGNIFICANT CHANGE UP (ref 0–0.2)
BASOPHILS NFR BLD AUTO: 2.8 % — HIGH (ref 0–2)
EOSINOPHIL # BLD AUTO: 0.09 K/UL — SIGNIFICANT CHANGE UP (ref 0–0.5)
EOSINOPHIL NFR BLD AUTO: 2.5 % — SIGNIFICANT CHANGE UP (ref 0–6)
HCT VFR BLD CALC: 31.7 % — LOW (ref 39–50)
HGB BLD-MCNC: 11 G/DL — LOW (ref 13–17)
IMM GRANULOCYTES NFR BLD AUTO: 2 % — HIGH (ref 0–0.9)
LYMPHOCYTES # BLD AUTO: 1.16 K/UL — SIGNIFICANT CHANGE UP (ref 1–3.3)
LYMPHOCYTES # BLD AUTO: 32.4 % — SIGNIFICANT CHANGE UP (ref 13–44)
MCHC RBC-ENTMCNC: 34.7 G/DL — SIGNIFICANT CHANGE UP (ref 32–36)
MCHC RBC-ENTMCNC: 38.7 PG — HIGH (ref 27–34)
MCV RBC AUTO: 111.6 FL — HIGH (ref 80–100)
MONOCYTES # BLD AUTO: 0.25 K/UL — SIGNIFICANT CHANGE UP (ref 0–0.9)
MONOCYTES NFR BLD AUTO: 7 % — SIGNIFICANT CHANGE UP (ref 2–14)
NEUTROPHILS # BLD AUTO: 1.91 K/UL — SIGNIFICANT CHANGE UP (ref 1.8–7.4)
NEUTROPHILS NFR BLD AUTO: 53.3 % — SIGNIFICANT CHANGE UP (ref 43–77)
NRBC # BLD: 0 /100 WBCS — SIGNIFICANT CHANGE UP (ref 0–0)
PLATELET # BLD AUTO: 307 K/UL — SIGNIFICANT CHANGE UP (ref 150–400)
RBC # BLD: 2.84 M/UL — LOW (ref 4.2–5.8)
RBC # FLD: 17.9 % — HIGH (ref 10.3–14.5)
WBC # BLD: 3.58 K/UL — LOW (ref 3.8–10.5)
WBC # FLD AUTO: 3.58 K/UL — LOW (ref 3.8–10.5)

## 2024-04-11 RX ORDER — GABAPENTIN 400 MG/1
3 CAPSULE ORAL
Refills: 0 | DISCHARGE

## 2024-04-11 RX ORDER — ACYCLOVIR SODIUM 500 MG
1 VIAL (EA) INTRAVENOUS
Refills: 0 | DISCHARGE

## 2024-04-11 RX ORDER — FLUCONAZOLE 150 MG/1
1 TABLET ORAL
Refills: 0 | DISCHARGE

## 2024-04-11 RX ORDER — CYCLOBENZAPRINE HYDROCHLORIDE 10 MG/1
1 TABLET, FILM COATED ORAL
Refills: 0 | DISCHARGE

## 2024-04-11 RX ORDER — SERTRALINE 25 MG/1
1 TABLET, FILM COATED ORAL
Refills: 0 | DISCHARGE

## 2024-04-11 RX ORDER — CLOPIDOGREL BISULFATE 75 MG/1
1 TABLET, FILM COATED ORAL
Refills: 0 | DISCHARGE

## 2024-04-11 RX ORDER — ROSUVASTATIN CALCIUM 5 MG/1
1 TABLET ORAL
Refills: 0 | DISCHARGE

## 2024-04-11 RX ORDER — AMLODIPINE BESYLATE 2.5 MG/1
1 TABLET ORAL
Refills: 0 | DISCHARGE

## 2024-04-11 RX ORDER — OMEPRAZOLE 10 MG/1
1 CAPSULE, DELAYED RELEASE ORAL
Refills: 0 | DISCHARGE

## 2024-04-11 RX ORDER — ASPIRIN/CALCIUM CARB/MAGNESIUM 324 MG
1 TABLET ORAL
Refills: 0 | DISCHARGE

## 2024-04-12 ENCOUNTER — APPOINTMENT (OUTPATIENT)
Dept: INFUSION THERAPY | Facility: HOSPITAL | Age: 84
End: 2024-04-12

## 2024-04-12 ENCOUNTER — RESULT REVIEW (OUTPATIENT)
Age: 84
End: 2024-04-12

## 2024-04-12 DIAGNOSIS — D46.C MYELODYSPLASTIC SYNDROME WITH ISOLATED DEL(5Q) CHROMOSOMAL ABNORMALITY: ICD-10-CM

## 2024-04-12 LAB
BASOPHILS # BLD AUTO: 0.09 K/UL — SIGNIFICANT CHANGE UP (ref 0–0.2)
BASOPHILS NFR BLD AUTO: 3.3 % — HIGH (ref 0–2)
EOSINOPHIL # BLD AUTO: 0.08 K/UL — SIGNIFICANT CHANGE UP (ref 0–0.5)
EOSINOPHIL NFR BLD AUTO: 2.9 % — SIGNIFICANT CHANGE UP (ref 0–6)
HCT VFR BLD CALC: 31.6 % — LOW (ref 39–50)
HGB BLD-MCNC: 10.7 G/DL — LOW (ref 13–17)
IMM GRANULOCYTES NFR BLD AUTO: 2.2 % — HIGH (ref 0–0.9)
LYMPHOCYTES # BLD AUTO: 0.91 K/UL — LOW (ref 1–3.3)
LYMPHOCYTES # BLD AUTO: 33 % — SIGNIFICANT CHANGE UP (ref 13–44)
MCHC RBC-ENTMCNC: 33.9 G/DL — SIGNIFICANT CHANGE UP (ref 32–36)
MCHC RBC-ENTMCNC: 38.4 PG — HIGH (ref 27–34)
MCV RBC AUTO: 113.3 FL — HIGH (ref 80–100)
MONOCYTES # BLD AUTO: 0.19 K/UL — SIGNIFICANT CHANGE UP (ref 0–0.9)
MONOCYTES NFR BLD AUTO: 6.9 % — SIGNIFICANT CHANGE UP (ref 2–14)
NEUTROPHILS # BLD AUTO: 1.43 K/UL — LOW (ref 1.8–7.4)
NEUTROPHILS NFR BLD AUTO: 51.7 % — SIGNIFICANT CHANGE UP (ref 43–77)
NRBC # BLD: 0 /100 WBCS — SIGNIFICANT CHANGE UP (ref 0–0)
PLATELET # BLD AUTO: 308 K/UL — SIGNIFICANT CHANGE UP (ref 150–400)
RBC # BLD: 2.79 M/UL — LOW (ref 4.2–5.8)
RBC # FLD: 17.6 % — HIGH (ref 10.3–14.5)
WBC # BLD: 2.76 K/UL — LOW (ref 3.8–10.5)
WBC # FLD AUTO: 2.76 K/UL — LOW (ref 3.8–10.5)

## 2024-04-13 LAB
ALBUMIN SERPL ELPH-MCNC: 4 G/DL — SIGNIFICANT CHANGE UP (ref 3.3–5)
ALP SERPL-CCNC: 94 U/L — SIGNIFICANT CHANGE UP (ref 40–120)
ALT FLD-CCNC: 11 U/L — SIGNIFICANT CHANGE UP (ref 10–45)
ANION GAP SERPL CALC-SCNC: 13 MMOL/L — SIGNIFICANT CHANGE UP (ref 5–17)
AST SERPL-CCNC: 19 U/L — SIGNIFICANT CHANGE UP (ref 10–40)
BILIRUB SERPL-MCNC: 0.4 MG/DL — SIGNIFICANT CHANGE UP (ref 0.2–1.2)
BUN SERPL-MCNC: 20 MG/DL — SIGNIFICANT CHANGE UP (ref 7–23)
CALCIUM SERPL-MCNC: 8.8 MG/DL — SIGNIFICANT CHANGE UP (ref 8.4–10.5)
CHLORIDE SERPL-SCNC: 102 MMOL/L — SIGNIFICANT CHANGE UP (ref 96–108)
CO2 SERPL-SCNC: 25 MMOL/L — SIGNIFICANT CHANGE UP (ref 22–31)
CREAT SERPL-MCNC: 1.73 MG/DL — HIGH (ref 0.5–1.3)
EGFR: 39 ML/MIN/1.73M2 — LOW
GLUCOSE SERPL-MCNC: 109 MG/DL — HIGH (ref 70–99)
POTASSIUM SERPL-MCNC: 3.9 MMOL/L — SIGNIFICANT CHANGE UP (ref 3.5–5.3)
POTASSIUM SERPL-SCNC: 3.9 MMOL/L — SIGNIFICANT CHANGE UP (ref 3.5–5.3)
PROT SERPL-MCNC: 7.1 G/DL — SIGNIFICANT CHANGE UP (ref 6–8.3)
SODIUM SERPL-SCNC: 141 MMOL/L — SIGNIFICANT CHANGE UP (ref 135–145)

## 2024-04-15 ENCOUNTER — LABORATORY RESULT (OUTPATIENT)
Age: 84
End: 2024-04-15

## 2024-04-17 ENCOUNTER — RX RENEWAL (OUTPATIENT)
Age: 84
End: 2024-04-17

## 2024-04-17 RX ORDER — DICLOFENAC SODIUM 1% 10 MG/G
1 GEL TOPICAL 4 TIMES DAILY
Qty: 100 | Refills: 1 | Status: ACTIVE | COMMUNITY
Start: 2023-05-12 | End: 1900-01-01

## 2024-04-17 RX ORDER — OMEPRAZOLE 20 MG/1
20 CAPSULE, DELAYED RELEASE ORAL DAILY
Qty: 30 | Refills: 3 | Status: ACTIVE | COMMUNITY
Start: 2023-01-26 | End: 1900-01-01

## 2024-04-18 ENCOUNTER — LABORATORY RESULT (OUTPATIENT)
Age: 84
End: 2024-04-18

## 2024-04-22 ENCOUNTER — LABORATORY RESULT (OUTPATIENT)
Age: 84
End: 2024-04-22

## 2024-04-24 RX ORDER — GABAPENTIN 300 MG/1
300 CAPSULE ORAL 3 TIMES DAILY
Qty: 90 | Refills: 3 | Status: ACTIVE | COMMUNITY
Start: 2023-01-26 | End: 1900-01-01

## 2024-04-25 ENCOUNTER — NON-APPOINTMENT (OUTPATIENT)
Age: 84
End: 2024-04-25

## 2024-04-25 ENCOUNTER — APPOINTMENT (OUTPATIENT)
Dept: VASCULAR SURGERY | Facility: CLINIC | Age: 84
End: 2024-04-25
Payer: MEDICARE

## 2024-04-25 VITALS
HEIGHT: 67 IN | TEMPERATURE: 97.6 F | HEART RATE: 114 BPM | BODY MASS INDEX: 28.25 KG/M2 | SYSTOLIC BLOOD PRESSURE: 149 MMHG | WEIGHT: 180 LBS | DIASTOLIC BLOOD PRESSURE: 90 MMHG

## 2024-04-25 PROCEDURE — 99202 OFFICE O/P NEW SF 15 MIN: CPT

## 2024-04-26 ENCOUNTER — APPOINTMENT (OUTPATIENT)
Dept: HEMATOLOGY ONCOLOGY | Facility: CLINIC | Age: 84
End: 2024-04-26
Payer: MEDICARE

## 2024-04-26 ENCOUNTER — RESULT REVIEW (OUTPATIENT)
Age: 84
End: 2024-04-26

## 2024-04-26 ENCOUNTER — LABORATORY RESULT (OUTPATIENT)
Age: 84
End: 2024-04-26

## 2024-04-26 VITALS
TEMPERATURE: 97.3 F | DIASTOLIC BLOOD PRESSURE: 75 MMHG | HEART RATE: 78 BPM | OXYGEN SATURATION: 98 % | WEIGHT: 178.57 LBS | RESPIRATION RATE: 13 BRPM | SYSTOLIC BLOOD PRESSURE: 122 MMHG | BODY MASS INDEX: 27.97 KG/M2

## 2024-04-26 DIAGNOSIS — D53.9 NUTRITIONAL ANEMIA, UNSPECIFIED: ICD-10-CM

## 2024-04-26 DIAGNOSIS — N18.32 CHRONIC KIDNEY DISEASE, STAGE 3B: ICD-10-CM

## 2024-04-26 LAB
ALBUMIN SERPL ELPH-MCNC: 4.4 G/DL
ALP BLD-CCNC: 103 U/L
ALT SERPL-CCNC: 8 U/L
ANION GAP SERPL CALC-SCNC: 14 MMOL/L
APPEARANCE: CLEAR
AST SERPL-CCNC: 12 U/L
BASOPHILS # BLD AUTO: 0.02 K/UL — SIGNIFICANT CHANGE UP (ref 0–0.2)
BASOPHILS NFR BLD AUTO: 0.7 % — SIGNIFICANT CHANGE UP (ref 0–2)
BILIRUB SERPL-MCNC: 0.7 MG/DL
BILIRUBIN URINE: NEGATIVE
BLOOD URINE: NEGATIVE
BUN SERPL-MCNC: 26 MG/DL
CALCIUM SERPL-MCNC: 8.9 MG/DL
CHLORIDE SERPL-SCNC: 104 MMOL/L
CHOLEST SERPL-MCNC: 125 MG/DL
CO2 SERPL-SCNC: 24 MMOL/L
COLOR: NORMAL
CREAT SERPL-MCNC: 1.86 MG/DL
EGFR: 36 ML/MIN/1.73M2
EOSINOPHIL # BLD AUTO: 0.12 K/UL — SIGNIFICANT CHANGE UP (ref 0–0.5)
EOSINOPHIL NFR BLD AUTO: 4.1 % — SIGNIFICANT CHANGE UP (ref 0–6)
GLUCOSE QUALITATIVE U: NEGATIVE MG/DL
GLUCOSE SERPL-MCNC: 121 MG/DL
HCT VFR BLD CALC: 29.5 % — LOW (ref 39–50)
HDLC SERPL-MCNC: 32 MG/DL
HGB BLD-MCNC: 9.9 G/DL — LOW (ref 13–17)
IMM GRANULOCYTES NFR BLD AUTO: 0.7 % — SIGNIFICANT CHANGE UP (ref 0–0.9)
KETONES URINE: NEGATIVE MG/DL
LDLC SERPL CALC-MCNC: 52 MG/DL
LEUKOCYTE ESTERASE URINE: ABNORMAL
LYMPHOCYTES # BLD AUTO: 1.03 K/UL — SIGNIFICANT CHANGE UP (ref 1–3.3)
LYMPHOCYTES # BLD AUTO: 35.3 % — SIGNIFICANT CHANGE UP (ref 13–44)
MCHC RBC-ENTMCNC: 33.6 G/DL — SIGNIFICANT CHANGE UP (ref 32–36)
MCHC RBC-ENTMCNC: 39 PG — HIGH (ref 27–34)
MCV RBC AUTO: 116.1 FL — HIGH (ref 80–100)
MONOCYTES # BLD AUTO: 0.22 K/UL — SIGNIFICANT CHANGE UP (ref 0–0.9)
MONOCYTES NFR BLD AUTO: 7.5 % — SIGNIFICANT CHANGE UP (ref 2–14)
NEUTROPHILS # BLD AUTO: 1.51 K/UL — LOW (ref 1.8–7.4)
NEUTROPHILS NFR BLD AUTO: 51.7 % — SIGNIFICANT CHANGE UP (ref 43–77)
NITRITE URINE: NEGATIVE
NONHDLC SERPL-MCNC: 93 MG/DL
NRBC # BLD: 0 /100 WBCS — SIGNIFICANT CHANGE UP (ref 0–0)
NT-PROBNP SERPL-MCNC: 1968 PG/ML
PH URINE: 6
PLATELET # BLD AUTO: 132 K/UL — LOW (ref 150–400)
POTASSIUM SERPL-SCNC: 4.2 MMOL/L
PROT SERPL-MCNC: 6.8 G/DL
PROTEIN URINE: 100 MG/DL
RBC # BLD: 2.54 M/UL — LOW (ref 4.2–5.8)
RBC # FLD: 19.3 % — HIGH (ref 10.3–14.5)
SODIUM SERPL-SCNC: 142 MMOL/L
SPECIFIC GRAVITY URINE: 1.02
TRIGL SERPL-MCNC: 260 MG/DL
URATE SERPL-MCNC: 5.8 MG/DL
UROBILINOGEN URINE: 1 MG/DL
WBC # BLD: 2.92 K/UL — LOW (ref 3.8–10.5)
WBC # FLD AUTO: 2.92 K/UL — LOW (ref 3.8–10.5)

## 2024-04-26 PROCEDURE — 99214 OFFICE O/P EST MOD 30 MIN: CPT

## 2024-04-26 NOTE — RESULTS/DATA
[FreeTextEntry1] : 4/46/2024 Most recent labs from 4/22/2024 HGB 10.5 <10.2 <...11.1 WBC 4.26  < 180   CMP form  WNL except Cr 1.73   4/8/2024 WBC 3.69 Hgb 11.4 Platelet 301 Normal LDH CMP w/ Cr 1.9, eGFR 35   3/13/2024 WBC 3.05 Hgb 11.6 Platelets 265 Normal LDH and UA Cr 1.99, eGFR 33  2/12/2024  HGB 11.3   WBC 3.19  BONE MARROW  	 Patient:     MASOOD MACARIO   Accession:                             [id number]  Collected Date/Time:                   2/2/2024 11:08 EST Received Date/Time:                    2/3/2024 11:15 EST  Hematopathology Report - Auth (Verified)  Specimen(s) Submitted 1. Bone marrow biopsy MH 2. Bone marrow aspirate for Flow OP  Final Diagnosis 1, 2. Bone marrow biopsy and bone marrow aspirate      - Cellular bone marrow with   erythroid predominant  trilineage  hematopoiesis, reduced  myelopoiesis and  adequate  megakaryopoiesis See note and description.  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  Current biopsy shows cellular bone marrow with    erythroid predominant  trilineage  hematopoiesis, reduced  myelopoiesis and adequate  megakaryopoiesis  with few small  hypolobated forms. Aspirate smears show  dysplastic  features in erythroid  elements  including nuclear irregularities, budding and   megloblastoid  changes.  Suggest correlation with pending   cytogenetics , FISH and myeloid  NGS panel. Comprehensive report with results of pending ancillary studies to follow.  Ancillary studies Bone marrow aspirate iron stain:   Iron stores are increased; ring  sideroblasts are not increased. Flow  cytometry ([id number]):     -  The lymphocyte   immunophenotypic findings show no diagnostic abnormalities.     -  Myeloblasts (0.65% of cells), positive for   myeloperoxidase , HLA-DR, CD34, , CD33, CD13, partial dim CD7; negative for CD15, CD16, CD64, CD2, CD4.      - The  myeloid immunophenotypic  findings show normal  myeloid granularity, no increase in   myeloid immaturity, and normal  myeloid antigen maturation pattern, and the presence of    hematogones (which are reported to be low in   myelodysplasia). Immunohistochemical  stains:  Immunostains CD34, ,  Myeloperoxidase , CD15, CD71, E- cadherin , CD61, Factor VIII, p53 are performed on block 1A. CD34 stains less than 1% cells.  stains increased scattered mast cells.  Myeloperoxidase , CD15 highlight  myeloid elements. CD71 stains many clusters of   erythroid elements and show  erythroid predominance. E- cadherin  stains few pronormoblasts . CD61, factor VIII stains  megakaryocytes  and highlight small  hypolobated forms. p53 (dim) stains few scattered cells. Cytogenetics : Pending FISH:  Pending  Microscopic description: 1. Biopsy:  Sections of bone marrow biopsy and bone marrow fragments in clot show variable  cellularity  (5% -50% cellularity ) in a fragmented and hemorrhagic core biopsy,   erythroid predominant  trilineage hematopoiesis with maturation, reduced   myelopoiesis. M:E ratio is reduced.  Megakaryocytes  are normal in number with occasional small  hypolobated forms. Iron stores are increased.  2. Aspirate:  Spicular  and cellular; adequate for interpretation. Maturing and mature  myeloid and  erythroid elements are present.  Erythroid  elements are markedly increased.   Erythroid elements show progressive maturation with  dysplastic features including nuclear irregularities, budding and  megaloblastoid changes. M:E ratio (0.3:1) is reduced.    Megakaryocytes appear normal in number and occasional small forms are present. Many scattered mast cells are present.  	 Flow Cytometry Final Report ________________________________________________________________________ Specimen: Bone marrow aspirate Date Collected: 02/02/2024 Date Received: 02/02/2024 Date Processed: 02/02/2024 Date Reported: 02/07/2024 16:45 Accession #: [id number] ________________________________________________________________________ CLINICAL DATA: Clinical history of myelodysplastic syndrome, rule out acute myeloid leukemia  ________________________________________________________________________ CD45/Side Scatter Differential Granulocytes: 45 % ;    Lymphocytes: 29 % ;    Monocytes: 4 % ;    Dim CD45 and/or blast gate: 5 % ;    Erythroid/debris: 15 % ________________________________________________________________________ DIAGNOSIS: Bone marrow aspirate:     -  The lymphocyte immunophenotypic findings show no diagnostic abnormalities.     - Myeloblasts (0.65% of cells), positive for myeloperoxidase, HLA-DR, CD34, , CD33, CD13, partial dim CD7; negative for CD15, CD16, CD64, CD2, CD4.      - The myeloid immunophenotypic findings show normal myeloid granularity, no increase in myeloid immaturity, and normal myeloid antigen maturation pattern, and the presence of hematogones (which are reported to be low in myelodysplasia). Please see interpretation.  INTERPRETATION: MORPHOLOGY: Maturing trilineage hematopoiesis CYTOSPIN: Maturing and mature myeloid elements with no significant lymphocytosis or immaturity; pronormoblasts present.  IMMUNOPHENOTYPE: Lymphocytes (29% of cells): Heterogeneous population of T-cells (with normal CD4 to CD8 ratio, including CD57+ natural killer-like T-cells, gamma/delta T-cells), natural killer cells, and polytypic B-cells. The lymphocyte immunophenotypic findings show no diagnostic abnormalities.  Myeloblasts (0.65% of cells), positive for myeloperoxidase, HLA-DR, CD34, , CD33, CD13, partial dim, CD7; negative for CD15, CD16, CD64, CD2, CD4.  CD45/side scatter shows 0.65% myeloblast population and normal myeloid granularity. There is no increase in CD34, CD14/CD64 or  positive cells. Myeloid antigen pattern is normal with CD13, CD16, CD11b, CD15, and CD33. Granulocytes express CD56.  Hematogones are present. The myeloid immunophenotypic findings show normal myeloid granularity, no increase in myeloid immaturity, and normal myeloid antigen maturation pattern, and the presence of hematogones (which are reported to be low in myelodysplasia). _____________________________________________________________________  PATHOLOGY: MOLECULAR   [employer] Advanced NGS Myeloid Panel Final Report  RESULT SUMMARY: ABNORMAL  DETECTED GENOMIC ALTERATIONS:   Tier I: Variants of Strong Clinical Significance   SF3B1 p.[id number]   Tier II: Variants of Potential Clinical Significance   DNMT3A p.?   Tier III: Variants of Unknown Clinical Significance   KIT p.Oqg332Pob   12/7/2023 HGB 10.4 WBC 3.47    9/18/2023  (Before and after stopping Revlimid)  WBC 1.14 > 6.08 Hgb 7.3  > 8.8  > 210  8/9/2023 Most recent blood work from 8/9/2023 WBC 1.14  Hgb 7.3     7/2/2023 Hgb 9.9, .4 WBC platelet normal  Cr 2.3 eGFR 28  Hypokalemic on 6/28 3.3  5/22/2023 WBC and platelet WNL Hgb 11.1  .9 CMP from 5/4-  Cr 1.79, eGFR 38 Pending CMP LDH UA today  3/24/2023 WBC 6.28 Hgb 10.2 .7 Platelet 224 Last Cr (2/24/23) - 2.42 Pending repeat CBC, CMP, LDH, UA  2/24/2023 WBC 2.62 Hgb 8.8 Platelet 128 Pending CMP, LDH, UA  1/26/2023 Last Hgb 9.1, Cr 2.3 (12/16/2022)    12/16/2022 HGB on 10/31/2022; 8.3,  Bone marrow biopsy:  Patient:   MASOOD MACARIO   Accession:                             [id number]  Collected Date/Time:                   10/31/2022 16:49 EDT Received Date/Time:                    10/31/2022 16:50 EDT  Hematopathology Addendum Report - Auth (Verified)  Hematopathology Addendum Additional immunohistochemical stains (block 1A: p53, ) show  increased  positive interstitial mast cells without aggregates. TP53  shows less than 1% bright positive cells.  There is no change in the diagnosis.  Verified by: Brooklynn Erazo (Electronic Signature) Reported on: 11/17/22 09:24 [facility], [facility], [address] [place] [address]. [place] [address] Phone: [phone]   Fax: [phone] _________________________________________________________________  Disclaimer Slide(s) with built in immunohistochemical study control(s) and negative  control associated with this case has/have been verified by the sign out  pathologist.  For slide(s) without built in controls positive control slides has/have  been reviewed and approved by immunohistochemistry lab  These immunohistochemical/ in-situ hybridization tests have been developed  and their performance characteristics determined by [facility], Department of Pathology, Division of Immunopathology,  [address] [place] [address].  It has not been cleared  or approved by the U.S. Food and Drug Administration.  The FDA has  determined that such clearance or approval is not necessary.  This test  is used for clinical purposes.  The laboratory is certified under the  CLIA-88 as qualified to perform high complexity clinical testing. Hematopathology Addendum Report - Auth (Verified)  Hematopathology Addendum       Comprehensive Hematopathology Report  Final Diagnosis : 1, 2. 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 The bone marrow  shows hypercellularity with erythroid hyperplasia and increased ring  sideroblasts and dysplastic megakaryocytosis. Interstitial mast cells  are increased with normal morphology, few CD25 positive, negative CD30.  The findings show myelodysplastic syndrome with multilineage dysplasia  (hypolobulated megakaryocytes) and ring sideroblasts. Correlation with  cytogenetics, FISH, and somatic mutation analysis to evaluate for complex  karyotype, del(5q), -7, del(7q) SF3B1, TP53, other abnormalities is  done. Please note findings of an   abnormal male karyotype, 46,XY,del(5)(q15q33) [13]/46,XY[7],  a positive MDS FISH panel and [email or web address] Advanced NGS  Myeloid Report showing   Tier I: Variants of Strong Clinical Significance:  SF3B1 p.Umg137Ybx (VAF: 39%),   Tier II: Variants of Potential Clinical   Significance: DNMT3A p.?  (VAF: 4%), Tier III: Variants of Unknown  Clinical Significance:   KIT p.Ojv877Qma  (VAF: 50%) . Based on the  additional findings, the MDS classification (ICC) is MDS with del(5q) and  with WHO is MDS with low blasts and 5q deletion.  Dr. Mcmahon was notified of the diagnosis on 11/07/2022.  Morphology: Microscopic description: 1. Biopsy:   Sections of bone marrow biopsy and bone marrow fragments in  clot show hypercellularity (50-85% cellularity), erythroid predominance  with maturation and increased pronormoblasts, maturing and mature  myeloid elements, megakaryocytes at least normal in number with abnormal  morphology (increased hypolobulated/small forms), increased interstitial  mast cells without aggregates, and iron stores increased.  2. Aspirate:   Cellular spicules are present, adequate for interpretation.   Maturing and mature myeloid and erythroid elements are present with  erythroid predominance (M:E ratio (1.16:1).  Megakaryocytes appear at  least normal in number with small/hypolobulated morphology. Mast cells  are increased in the spicules (round and normally granular).  Bone Marrow Aspirate Differential: (100 Cells). Type  % Normal* Blast  0% 0-3 Neutrophil and    Precursors   47% 33-63 Eosinophil  3% 1-5 Basophil  0% 0-1 Pronormoblast  5% 0-2 Normoblast  43% 15-25 Monocyte  0% 0-2 Lymphocyte  7% 10-15 Plasma cell  0% 0-1   *Adult Range  Comment Iron stain (examined to evaluate for iron stores; see microscopic  description) and Giemsa stain (shows appropriate staining pattern) are  performed and evaluated on block(s): 1A, 1B  Ancillary Studies: Bone marrow aspirate iron stain:   Iron stores are increased; numerous ring  sideroblasts are present (greater than 15%).  Flow cytometry:   The myeloid immunophenotypic findings show decreased  myeloid granularity, no increase in myeloid immaturity (myeloblasts,  0.32% of cells, with normal immunophenotype), normal myeloid antigen  maturation pattern, few mast cells, and the presence of hematogones  (which are reported to be low in myelodysplasia). The lymphocyte immunophenotypic findings show no diagnostic abnormalities.  Immunohistochemical stains   (block 1A: CD34, , myeloperoxidase,  CD71, E-cadherin, factor VIII, CD15, CD61, CD25, CD30, p53) show no  increase in CD34 positive cells (less than 3%), erythroid predominance  (positive with CD71), with clusters of pronormoblasts (positive with  E-cadherin); diminished myeloid elements with maturation (positive   with myeloperoxidase and CD15), and increased megakaryocyte number with  dysplastic, small/hypolobulated morphology (positive with factor VIII,  CD61). CD30 is negative in mast cells; a few show dim CD25 positivity.  Cytogenetics:  [id number] Date Collected:  10/31/2022 Result:  Abnormal male karyotype Karyotype: 46,XY,del(5)(q15q33)[13]/46,XY[7]  Fluorescence in situ Hybridization (FISH):    [id number] Result: ABNORMAL FISH MDS PANEL - 5q deletion detected (65%) Probe(s) and Location(s):   T0P475/ D5S23 (5p15.2), EGR1 (5q31), D7Z1  (7p11.1-q11.1), Z3X617 (7q31), CEP-8/D8Z2   ? (8p11.1-q11.1), M70J744  (20q12) ISCN Nomenclature:  nuc clif(H6J764/C4N16u2,EGR1x1)[130/200],  (D7Z1,Y2K026)x2[200], (D8Z2,I15O905)x2[197/200], (TP53x2)[197/200]  Molecular Analyses: [facility] Advanced NGS Myeloid Report Specimen ID:  [id number] Date Collected:  10/31/2022 Specimen Source:  Bone Marrow  RESULT SUMMARY:  ABNORMAL DETECTED GENOMIC ALTERATIONS: Tier I: Variants of Strong Clinical Significance SF3B1 p.Cdu086Lsa Tier II: Variants of Potential Clinical Significance DNMT3A p.? Tier III: Variants of Unknown Clinical Significance KIT p.Fkx655Ign  PERTINENT NEGATIVE RESULTS: The following genes are NEGATIVE for clinically relevant mutations.  Mutational hotspots and surrounding exonic regions were interrogated for  DNA level point mutations and indels (fusions not assayed). ABL1, ANKRD26, ASXL1, ATRX, BCOR, BCORL1, BRAF, CALR, CBL, CCND2, CDKN2A,  CEBPA, CSF3R, CUX1, DDX41, ETNK1, ETV6, EZH2, FBXW7, FLT3, GATA2, HRAS,  IDH1, IDH2, JAK2, KDM6A, KMT2A, KRAS, MAP2K1, MPL, MYD88, NF1, NPM1,  NRAS, PDGFRA, PHF6, PTEN, PTPN11, RUNX1, SETBP1, SRSF2, STAG2, TET2,  TP53, U2AF1, WT1, ZRSR2 TECHNICAL SUMMARY : DNMT3A p.? c.2174-1G>A 4% allele frequency Exon 19 [id number]  SF3B1 p.Tni984Ull c.1998G>C 39% allele frequency Exon 14 [id number]   KIT p.Wbh963Rpl c.1879C>T 50% allele frequency Exon 12 [id number]  Clinical History/Data  : History of macrocytic anemia with CKD rule out primary bone marrow  disorder  CBC, 10/31/2022  Test Code       Result         Reference                  Range WBC             5.75 K/uL      3.80 - 10.50 RBC             2.34 M/uL L    4.20 - 5.80 HGB             8.3 g/dL L     13.0 - 17.0 HCT             25.4 % L       39.0 - 50.0 MCV             108.5 fl H     80.0 - 100.0 MCH             35.5 pg H      27.0 - 34.0 MCHC            32.7 g/dL      32.0 - 36.0 RDW             18.5 % H       10.3 - 14.5 PLT             236 K/uL       150 - 400 Auto NRBC       0 /100 WBCs    0 - 0 NEUT%           63.5 %         43.0 - 77.0  NEUT#           3.65 K/uL      1.80 - 7.40 LYMPH%          20.5 %         13.0 - 44.0 LYMPH#          1.18 K/uL      1.00 - 3.30 MONO%           7.0 %          2.0 - 14.0 MONO#           0.40 K/uL      0.00 - 0.90 EOS%            3.3 %          0.0 - 6.0 EOS#            0.19 K/uL      0.00 - 0.50 BASO%           1.4 %          0.0 - 2.0 BASO#           0.08 K/uL      0.00 - 0.20 BUN             28 mg/dL H     7 - 23 Creatinine      2.32 mg/dL H   0.50 - 1.30  Verified by: Brooklynn Erazo (Electronic Signature) Reported on: 11/09/22 16:40 [facility], [facility], [address] [place] [address] Phone: [phone]   Fax: [phone]  10/27/2022 (Labs on 10/18/2022): Hgb stable at 9.0, normal WBC, platelets Creatinine 2.32 eGFR 28   5/12/2022 Available labs reviewed.  Macrocytic anemia, no etiology could be determined.

## 2024-04-26 NOTE — PHYSICAL EXAM
[Ambulatory and capable of all self care but unable to carry out any work activities] : Status 2- Ambulatory and capable of all self care but unable to carry out any work activities. Up and about more than 50% of waking hours [Normal] : affect appropriate [de-identified] : seen in wheelchair [de-identified] : afib rate controlled  [de-identified] : unsteady gait, 2 ppl assist

## 2024-04-26 NOTE — ASSESSMENT
[FreeTextEntry1] : 83-year-old Mr. MACARIO is seen in follow up for macrocytic anemia (initial evaluation: HGB ~9.5, MCV ~125), WBC and platelet counts are preserved. Patient now has some symptoms of anemia. 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 He was started on Revlimid in December but was unable to tolerate due to multiple side effects. Recent hospitalization for new onset afib + anemia. Course c/b acute gout flare, neutropenic fever, transaminitis, and JUAN RAMON on CKD. Since his discharge from the hospital, he has had two vagal episodes with afib RVR which is now being controlled by 25mg Metoprolol BID. Clinically, he is responding well to the Procrit injections with his Hgb ~10 and appears with more energy and strength. Restarted Revlimid 5mg daily but discontinued on 8/10/2023, and started on HMA. Weekly CBC and CMP to monitor his blood counts as well as liver/kidney function. Procrit injections discontinued before C7 treatment.   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) - Discussed and reviewed bone marrow findings with the patient - Started on Revlimid 5 mg daily (recommended dose 10 mg) and held in January 2023 due to cytopenias req hospitalization - Started 20,000U Procrit injections since February with great response (Hgb 10+)  - Restarted Revlimid 5 mg daily (June 2023), tolerating well but cytopenia worsened - Increase Procrit to 40,000 units QW (8/10/2023); hold for Hgb > 11 - Held Revlimid as of today (8/10/2023) for worsening pancytopenia - Cytopenia including HGB improved - Discussed and planned to start HMA (S/Q) - Patient consented (9/18/2023)  - C1D1 on 10/1/2023, C2 D1 on 10/30/2023 - Admitted to hospital for sepsis, Cycle 3 delayed - Last transfusion was in 11/2023 - Post C4 bone marrow: Discussed; response noted; Tri-lineage hematopoiesis, no increase in blasts  - He is cleared for the C7 treatment today - Transfuse for Hgb < 8 (if symptomatic) and platelets < 20k  - Home draw labs on Mondays and Thursdays for poss platelet transfusion on Tuesdays and Fridays - So far has not required transfusions since starting Dacogen tx - C/w trending CBC, CMP, LDH weekly home draws (order in)  - Proceed C8 treatment with Decitabine as scheduled - Self-administer 40k U Procrit when Hgb < 11 - HOLD FOR HGB > 11 (DISCONTINUED 2 months ago)   [] Afib, rate controlled, resolved - New onset afib - Likely multifactorial etiology of dehydration and anemia - [id number] score = 5 - On 6.25mg of Carvedilol - Cardiology held ASA and Plavix due to anemia - Evaluated by cards and started on Metoprolol - Now afib rate controlled  [] Gout, resolved - C/w Allopurinol and Colchicine - Right toe without S&S of infection - Continue Allopurinol - Continue f/u with rheum  [] Back pain - Last MRI in 2021 with lumbar spondylosis, with spinal canal narrowing - Left flank pain may be r/t the stenosis causing pinched nerve pains - Currently on Gabapentin 200mg daily - Increased to Gabapentin 300mg TID - Repeat MRI with no contrast (6/20) - chronic lumbar spondylosis - Recommended spine specialist - pt deferred at this time and will f/u w/ rheum  RTC in 6 weeks before C9 treatment

## 2024-04-26 NOTE — HISTORY OF PRESENT ILLNESS
[Treatment Protocol] : Treatment Protocol [de-identified] : CHART REVIEW: 81-year-old Mr. MACARIO is seen in consult for macrocytic anemia (Hgb 9.5, MCV ~125). Patient has multiple medical conditions including stage-3 CKD. He has no symptoms of anemia and has no complaints.  [FreeTextEntry1] : Dacogen q28 days. C1: 10/2/23 - 10/6/23. C2: 10/30/23 - 11/3/23. C3:12/11/23 - 12/15/23 (delayed; cancelled). C4: 1/8/24-1/12/24. C5: 2/12/24-2/16/24. C6: 3/11/23-3/15/24. C7: 4/8/24-4/12/24 [de-identified] : 05/12/22: 81 year-old Mr. MACARIO is seen in consult for macrocytic  anemia (Hgb 9.5, MCV ~125). Patient has multiple medical conditions including stage-3 CKD. He has no symptoms of anemia and has no complaints.   10/27/22: Patient presenting today for follow up for macrocytic anemia. Overall feels well. Endorses some fatigue and shortness of breath with activity. Denies headache, dizziness, Ambulates with walker. He is being followed by his nephrologist who manages his hypertension. Endorses taking diuretics every other day.    12/16/2022 Patient returns for a follow up. He endorses no change in his health status. 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 Patient has some symptoms of fatigue and tiredness.   1/26/2023 Patient returns for a follow up. He started taking Revlimid on 12/30/2022. About 2 weeks later he started developing side effects like-- loss  of appetite, body ache, constipation, pins and needle sensation. He also appears to have developed Jaundice.   2/24/2023 Patient seen in follow up, accompanied by Preeti ceja, at bedside. He had a recent hospitalization from 1/27 - 2/7 for new onset afib that was detected in the treatment room prior to his blood transfusion appointment. He was found to be in afib, anemic (Hgb 6.3) requiring 4 PRBCs throughout his stay. His course was c/b acute gout flare, neutropenic fever, transaminitis, and JUAN RAMON on CKD. 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 Had an episode of neutropenic fever started on Cefepime but stopped as per ID, most likely due to gout flare. UA + but asymptomatic, not treated. Bld cx neg x2. Syncopal episode while being on the commode yesterday 2/24. As per Preeti, EKG revealed rate controlled afib. Today, his HR was 127 in office. Denies dizziness, SOB, chest pain, palpitations. As per pt, was on a holter monitor that was submitted yesterday. He is f/u with Dr. Reddy (cards). He also have been having a dry cough for few days. + chills. No fever, body aches, night sweats. No sick contacts. Working with PT. Still feels weak, ambulating via wheelchair. Appetite is okay. Weight is stable.   3/24/2023 Patient seen in follow up. Accompanied by Preeti ceja at bedside. He has been responding well to 20k U Procrit weekly injections. Last Hgb 10.2, BP stable 111/58. Since his last visit, has been evaluated by cards for his afib (now rate controlled) currently on Metoprolol 25mg BID. He reports feeling much better since last encounter - he is now able to ambulate longer distances with a walker. Endorses unsteady gait (vertigo). Appetite is good, weight is stable.   5/22/2023 Patient seen in follow up. Accompanied by aide and daughter at bedside. Had a recent UTI, treated with Amoxicillin. He'll be following up with his PCP tomorrow. He endorses left flank pain that started after his UTI that is hindering him from ambulating. Describes the pain to be "shocking" and sharp in nature, pain reproduced by movements. His last MRI was in 2021 which revealed lumbar spondylosis with mild to moderate spinal canal narrowing. + peripheral neuropathy of his right lower ext. Currently on Gabapentin 200mg daily. His Hgb has been stable since April 27th. Last Hgb 11.1 last week. Otherwise, no complaints. Appetite is good and weight is stable.  7/5/2023 Patient seen in follow up. With aide and daughter at bedside. Interim, he's been feeling well. He completed first month of starting Revlimid without any side effects. His most recent CBC from last week 9.7, he plans on self administering Procrit today after the visit. He had MRI done 6/20 which showed degenerative vs inflammation of his lumbar spine. He continues to experience back pain and some scapular pain from pulled muscle last week. Recommended ortho / pain management for chronic back pain but pt denied at this time. He reports feeling weaker in his lower extremities but does not want to participate in PT at this time. Otherwise, feeling well. Appetite is good, weight is stable.   8/10/2023 Patient presents for a follow up. He appears pale. He feels weak and sleepy after taking the Lenalidomide pill. He is not doing any PT. He is not able to walk by himself yet. Hs anemia has become worse despite EUGENIA. Lenalidomide may not be helping to improve his disease but worsening the pancytopenia.    9/18/2023 Patient seen in follow up. He was taken off Revlimid in his last visit and his EUGENIA dose was increased to 40KU. His HGB increased to ~9g (from 7) and neutropenia and thrombocytopenia resolved. He looks and feels better. We discussed starting HMA.   12/7/2023 Patient presents for a follow up and evaluation for treatment. He completed 2 cycles of HMA (Dacogen). Before the 3rd cycle, he was admitted to the hospital for low O2Sat. He received IV abx for suspected sepsis. He was discharged a week ago. His physical condition has improved. His last transfusion (PRBC) was 3 weeks ago (11/2023). His Hgb is 10.4, platelets 175, WBC 3.47. He is continuing on Retacrit at home. He is holding his counts without transfusions. It appears that he is responding to the treatment.   2/12/2024  3/11/2024 Patient seen the tx room for a follow up appt. Today is C6D1. He is accompanied by Preeti, his aide and his son at bedside. Interim, he's been in good health. He is now able to climb up the stairs. He is slowly regaining his strength. He has been giving himself the Procrit despite his Hgb being > 11, we had a lengthy discussion again today going over the parameters for Procrit injections (ONLY ADMINISTER WHEN HGB < 11). The patient, as well as his aide and his son verbalized full understanding  4/8/2024 Patient seen in the tx room for a f/u. Today is C7D1 of Dacogen. He is accompanied by Preeti, his aide. Interim, he's been in good health. He has stopped his Procrit since his Hgb has been > 11. He reports that his legs feel weak and cannot climb the stairs well without falling. He plans to resume PT to regain his strength.  4/26/2024  Patient presents for a follow up for MDS on HMA. He also has CHK (eGFR 39) He has been on Decitabine monthly. He responded well to the treatment. He became transfusion independent, his counts normalized, He is not on EUGENIA or GCSF or TPO-RA. He slowly gained strength and now able to ambulate with a walker. He is going for a vascular surgery evaluation for possible stent in the LE vessels. He has no complaints.    -

## 2024-04-29 ENCOUNTER — LABORATORY RESULT (OUTPATIENT)
Age: 84
End: 2024-04-29

## 2024-04-30 RX ORDER — FUROSEMIDE 40 MG/1
40 TABLET ORAL
Qty: 90 | Refills: 1 | Status: ACTIVE | COMMUNITY

## 2024-05-01 ENCOUNTER — APPOINTMENT (OUTPATIENT)
Dept: HEMATOLOGY ONCOLOGY | Facility: CLINIC | Age: 84
End: 2024-05-01

## 2024-05-01 LAB
ALBUMIN SERPL ELPH-MCNC: 4.4 G/DL
ALP BLD-CCNC: 98 U/L
ALT SERPL-CCNC: 8 U/L
ANION GAP SERPL CALC-SCNC: 14 MMOL/L
AST SERPL-CCNC: 10 U/L
BILIRUB SERPL-MCNC: 0.6 MG/DL
BUN SERPL-MCNC: 28 MG/DL
CALCIUM SERPL-MCNC: 8.9 MG/DL
CHLORIDE SERPL-SCNC: 103 MMOL/L
CO2 SERPL-SCNC: 26 MMOL/L
CREAT SERPL-MCNC: 1.94 MG/DL
EGFR: 34 ML/MIN/1.73M2
GLUCOSE SERPL-MCNC: 165 MG/DL
LDH SERPL-CCNC: 196 U/L
POTASSIUM SERPL-SCNC: 4.7 MMOL/L
PROT SERPL-MCNC: 6.8 G/DL
SODIUM SERPL-SCNC: 143 MMOL/L

## 2024-05-02 ENCOUNTER — LABORATORY RESULT (OUTPATIENT)
Age: 84
End: 2024-05-02

## 2024-05-02 NOTE — PHYSICAL EXAM
[JVD] : no jugular venous distention  [Normal Breath Sounds] : Normal breath sounds [Normal Heart Sounds] : normal heart sounds [FreeTextEntry1] : Palpable bilateral femoral pulses.  Left lower extremity swelling present

## 2024-05-02 NOTE — HISTORY OF PRESENT ILLNESS
[FreeTextEntry1] : Patient with complex past medical history as listed above presented for left lower extremity peripheral arterial disease examination.  He has a left lower extremity swelling and also nonhealing toe ulcers.  Past medical history as listed.

## 2024-05-02 NOTE — ASSESSMENT
[FreeTextEntry1] : 83-year-old with complex past medical history with left lower extremity swelling and toe ulcers.  Will perform left lower extremity arterial duplex examination and follow-up in 1 month.

## 2024-05-06 ENCOUNTER — LABORATORY RESULT (OUTPATIENT)
Age: 84
End: 2024-05-06

## 2024-05-06 ENCOUNTER — RESULT REVIEW (OUTPATIENT)
Age: 84
End: 2024-05-06

## 2024-05-06 ENCOUNTER — APPOINTMENT (OUTPATIENT)
Dept: INFUSION THERAPY | Facility: HOSPITAL | Age: 84
End: 2024-05-06

## 2024-05-06 ENCOUNTER — APPOINTMENT (OUTPATIENT)
Dept: HEMATOLOGY ONCOLOGY | Facility: CLINIC | Age: 84
End: 2024-05-06

## 2024-05-06 LAB
ALBUMIN SERPL ELPH-MCNC: 4.2 G/DL — SIGNIFICANT CHANGE UP (ref 3.3–5)
ALP SERPL-CCNC: 89 U/L — SIGNIFICANT CHANGE UP (ref 40–120)
ALT FLD-CCNC: <5 U/L — LOW (ref 10–45)
ANION GAP SERPL CALC-SCNC: 8 MMOL/L — SIGNIFICANT CHANGE UP (ref 5–17)
AST SERPL-CCNC: 15 U/L — SIGNIFICANT CHANGE UP (ref 10–40)
BASOPHILS # BLD AUTO: 0.09 K/UL — SIGNIFICANT CHANGE UP (ref 0–0.2)
BASOPHILS NFR BLD AUTO: 3.1 % — HIGH (ref 0–2)
BILIRUB SERPL-MCNC: 0.6 MG/DL — SIGNIFICANT CHANGE UP (ref 0.2–1.2)
BUN SERPL-MCNC: 22 MG/DL — SIGNIFICANT CHANGE UP (ref 7–23)
CALCIUM SERPL-MCNC: 8.9 MG/DL — SIGNIFICANT CHANGE UP (ref 8.4–10.5)
CHLORIDE SERPL-SCNC: 107 MMOL/L — SIGNIFICANT CHANGE UP (ref 96–108)
CO2 SERPL-SCNC: 24 MMOL/L — SIGNIFICANT CHANGE UP (ref 22–31)
CREAT SERPL-MCNC: 1.78 MG/DL — HIGH (ref 0.5–1.3)
EGFR: 38 ML/MIN/1.73M2 — LOW
EOSINOPHIL # BLD AUTO: 0.05 K/UL — SIGNIFICANT CHANGE UP (ref 0–0.5)
EOSINOPHIL NFR BLD AUTO: 1.7 % — SIGNIFICANT CHANGE UP (ref 0–6)
GLUCOSE SERPL-MCNC: 113 MG/DL — HIGH (ref 70–99)
HCT VFR BLD CALC: 29.3 % — LOW (ref 39–50)
HGB BLD-MCNC: 10 G/DL — LOW (ref 13–17)
IMM GRANULOCYTES NFR BLD AUTO: 2.8 % — HIGH (ref 0–0.9)
LDH SERPL L TO P-CCNC: 201 U/L — SIGNIFICANT CHANGE UP (ref 50–242)
LYMPHOCYTES # BLD AUTO: 1.07 K/UL — SIGNIFICANT CHANGE UP (ref 1–3.3)
LYMPHOCYTES # BLD AUTO: 37.4 % — SIGNIFICANT CHANGE UP (ref 13–44)
MCHC RBC-ENTMCNC: 34.1 G/DL — SIGNIFICANT CHANGE UP (ref 32–36)
MCHC RBC-ENTMCNC: 38.6 PG — HIGH (ref 27–34)
MCV RBC AUTO: 113.1 FL — HIGH (ref 80–100)
MONOCYTES # BLD AUTO: 0.19 K/UL — SIGNIFICANT CHANGE UP (ref 0–0.9)
MONOCYTES NFR BLD AUTO: 6.6 % — SIGNIFICANT CHANGE UP (ref 2–14)
NEUTROPHILS # BLD AUTO: 1.38 K/UL — LOW (ref 1.8–7.4)
NEUTROPHILS NFR BLD AUTO: 48.4 % — SIGNIFICANT CHANGE UP (ref 43–77)
NRBC # BLD: 0 /100 WBCS — SIGNIFICANT CHANGE UP (ref 0–0)
PLATELET # BLD AUTO: 262 K/UL — SIGNIFICANT CHANGE UP (ref 150–400)
POTASSIUM SERPL-MCNC: 4.4 MMOL/L — SIGNIFICANT CHANGE UP (ref 3.5–5.3)
POTASSIUM SERPL-SCNC: 4.4 MMOL/L — SIGNIFICANT CHANGE UP (ref 3.5–5.3)
PROT SERPL-MCNC: 6.8 G/DL — SIGNIFICANT CHANGE UP (ref 6–8.3)
RBC # BLD: 2.59 M/UL — LOW (ref 4.2–5.8)
RBC # FLD: 19.3 % — HIGH (ref 10.3–14.5)
SODIUM SERPL-SCNC: 138 MMOL/L — SIGNIFICANT CHANGE UP (ref 135–145)
WBC # BLD: 2.86 K/UL — LOW (ref 3.8–10.5)
WBC # FLD AUTO: 2.86 K/UL — LOW (ref 3.8–10.5)

## 2024-05-07 ENCOUNTER — LABORATORY RESULT (OUTPATIENT)
Age: 84
End: 2024-05-07

## 2024-05-07 ENCOUNTER — APPOINTMENT (OUTPATIENT)
Dept: INFUSION THERAPY | Facility: HOSPITAL | Age: 84
End: 2024-05-07

## 2024-05-08 ENCOUNTER — APPOINTMENT (OUTPATIENT)
Dept: INFUSION THERAPY | Facility: HOSPITAL | Age: 84
End: 2024-05-08

## 2024-05-09 ENCOUNTER — RESULT REVIEW (OUTPATIENT)
Age: 84
End: 2024-05-09

## 2024-05-09 ENCOUNTER — APPOINTMENT (OUTPATIENT)
Dept: HEMATOLOGY ONCOLOGY | Facility: CLINIC | Age: 84
End: 2024-05-09

## 2024-05-09 ENCOUNTER — APPOINTMENT (OUTPATIENT)
Dept: INFUSION THERAPY | Facility: HOSPITAL | Age: 84
End: 2024-05-09

## 2024-05-09 LAB
ANISOCYTOSIS BLD QL: SLIGHT — SIGNIFICANT CHANGE UP
BASOPHILS # BLD AUTO: 0.11 K/UL — SIGNIFICANT CHANGE UP (ref 0–0.2)
BASOPHILS NFR BLD AUTO: 3.7 % — HIGH (ref 0–2)
DACRYOCYTES BLD QL SMEAR: SLIGHT — SIGNIFICANT CHANGE UP
ELLIPTOCYTES BLD QL SMEAR: SLIGHT — SIGNIFICANT CHANGE UP
EOSINOPHIL # BLD AUTO: 0.06 K/UL — SIGNIFICANT CHANGE UP (ref 0–0.5)
EOSINOPHIL NFR BLD AUTO: 2 % — SIGNIFICANT CHANGE UP (ref 0–6)
HCT VFR BLD CALC: 30.5 % — LOW (ref 39–50)
HGB BLD-MCNC: 10.2 G/DL — LOW (ref 13–17)
IMM GRANULOCYTES NFR BLD AUTO: 2.7 % — HIGH (ref 0–0.9)
LYMPHOCYTES # BLD AUTO: 0.94 K/UL — LOW (ref 1–3.3)
LYMPHOCYTES # BLD AUTO: 31.5 % — SIGNIFICANT CHANGE UP (ref 13–44)
MACROCYTES BLD QL: SLIGHT — SIGNIFICANT CHANGE UP
MCHC RBC-ENTMCNC: 33.4 G/DL — SIGNIFICANT CHANGE UP (ref 32–36)
MCHC RBC-ENTMCNC: 38.1 PG — HIGH (ref 27–34)
MCV RBC AUTO: 113.8 FL — HIGH (ref 80–100)
MONOCYTES # BLD AUTO: 0.18 K/UL — SIGNIFICANT CHANGE UP (ref 0–0.9)
MONOCYTES NFR BLD AUTO: 6 % — SIGNIFICANT CHANGE UP (ref 2–14)
NEUTROPHILS # BLD AUTO: 1.61 K/UL — LOW (ref 1.8–7.4)
NEUTROPHILS NFR BLD AUTO: 54.1 % — SIGNIFICANT CHANGE UP (ref 43–77)
NRBC # BLD: 0 /100 WBCS — SIGNIFICANT CHANGE UP (ref 0–0)
PLAT MORPH BLD: NORMAL — SIGNIFICANT CHANGE UP
PLATELET # BLD AUTO: 242 K/UL — SIGNIFICANT CHANGE UP (ref 150–400)
POIKILOCYTOSIS BLD QL AUTO: SLIGHT — SIGNIFICANT CHANGE UP
RBC # BLD: 2.68 M/UL — LOW (ref 4.2–5.8)
RBC # FLD: 19.1 % — HIGH (ref 10.3–14.5)
RBC BLD AUTO: ABNORMAL
SCHISTOCYTES BLD QL AUTO: SLIGHT — SIGNIFICANT CHANGE UP
WBC # BLD: 2.98 K/UL — LOW (ref 3.8–10.5)
WBC # FLD AUTO: 2.98 K/UL — LOW (ref 3.8–10.5)

## 2024-05-10 ENCOUNTER — RESULT REVIEW (OUTPATIENT)
Age: 84
End: 2024-05-10

## 2024-05-10 ENCOUNTER — APPOINTMENT (OUTPATIENT)
Dept: INFUSION THERAPY | Facility: HOSPITAL | Age: 84
End: 2024-05-10

## 2024-05-10 LAB
ALBUMIN SERPL ELPH-MCNC: 4.4 G/DL — SIGNIFICANT CHANGE UP (ref 3.3–5)
ALP SERPL-CCNC: 91 U/L — SIGNIFICANT CHANGE UP (ref 40–120)
ALT FLD-CCNC: <5 U/L — LOW (ref 10–45)
ANION GAP SERPL CALC-SCNC: 12 MMOL/L — SIGNIFICANT CHANGE UP (ref 5–17)
AST SERPL-CCNC: 23 U/L — SIGNIFICANT CHANGE UP (ref 10–40)
BASOPHILS # BLD AUTO: 0.14 K/UL — SIGNIFICANT CHANGE UP (ref 0–0.2)
BASOPHILS NFR BLD AUTO: 3.8 % — HIGH (ref 0–2)
BILIRUB SERPL-MCNC: 0.7 MG/DL — SIGNIFICANT CHANGE UP (ref 0.2–1.2)
BUN SERPL-MCNC: 22 MG/DL — SIGNIFICANT CHANGE UP (ref 7–23)
CALCIUM SERPL-MCNC: 9 MG/DL — SIGNIFICANT CHANGE UP (ref 8.4–10.5)
CHLORIDE SERPL-SCNC: 105 MMOL/L — SIGNIFICANT CHANGE UP (ref 96–108)
CO2 SERPL-SCNC: 23 MMOL/L — SIGNIFICANT CHANGE UP (ref 22–31)
CREAT SERPL-MCNC: 1.61 MG/DL — HIGH (ref 0.5–1.3)
EGFR: 42 ML/MIN/1.73M2 — LOW
EOSINOPHIL # BLD AUTO: 0.06 K/UL — SIGNIFICANT CHANGE UP (ref 0–0.5)
EOSINOPHIL NFR BLD AUTO: 1.6 % — SIGNIFICANT CHANGE UP (ref 0–6)
GLUCOSE SERPL-MCNC: 75 MG/DL — SIGNIFICANT CHANGE UP (ref 70–99)
HCT VFR BLD CALC: 31.8 % — LOW (ref 39–50)
HGB BLD-MCNC: 10.4 G/DL — LOW (ref 13–17)
IMM GRANULOCYTES NFR BLD AUTO: 2.5 % — HIGH (ref 0–0.9)
LYMPHOCYTES # BLD AUTO: 1.02 K/UL — SIGNIFICANT CHANGE UP (ref 1–3.3)
LYMPHOCYTES # BLD AUTO: 27.9 % — SIGNIFICANT CHANGE UP (ref 13–44)
MCHC RBC-ENTMCNC: 32.7 G/DL — SIGNIFICANT CHANGE UP (ref 32–36)
MCHC RBC-ENTMCNC: 38 PG — HIGH (ref 27–34)
MCV RBC AUTO: 116.1 FL — HIGH (ref 80–100)
MONOCYTES # BLD AUTO: 0.21 K/UL — SIGNIFICANT CHANGE UP (ref 0–0.9)
MONOCYTES NFR BLD AUTO: 5.7 % — SIGNIFICANT CHANGE UP (ref 2–14)
NEUTROPHILS # BLD AUTO: 2.14 K/UL — SIGNIFICANT CHANGE UP (ref 1.8–7.4)
NEUTROPHILS NFR BLD AUTO: 58.5 % — SIGNIFICANT CHANGE UP (ref 43–77)
NRBC # BLD: 0 /100 WBCS — SIGNIFICANT CHANGE UP (ref 0–0)
PLATELET # BLD AUTO: 266 K/UL — SIGNIFICANT CHANGE UP (ref 150–400)
POTASSIUM SERPL-MCNC: 4.3 MMOL/L — SIGNIFICANT CHANGE UP (ref 3.5–5.3)
POTASSIUM SERPL-SCNC: 4.3 MMOL/L — SIGNIFICANT CHANGE UP (ref 3.5–5.3)
PROT SERPL-MCNC: 7 G/DL — SIGNIFICANT CHANGE UP (ref 6–8.3)
RBC # BLD: 2.74 M/UL — LOW (ref 4.2–5.8)
RBC # FLD: 19.2 % — HIGH (ref 10.3–14.5)
SODIUM SERPL-SCNC: 141 MMOL/L — SIGNIFICANT CHANGE UP (ref 135–145)
WBC # BLD: 3.66 K/UL — LOW (ref 3.8–10.5)
WBC # FLD AUTO: 3.66 K/UL — LOW (ref 3.8–10.5)

## 2024-05-10 RX ORDER — ACYCLOVIR 400 MG/1
400 TABLET ORAL
Qty: 60 | Refills: 3 | Status: ACTIVE | COMMUNITY
Start: 2023-09-29 | End: 1900-01-01

## 2024-05-13 ENCOUNTER — LABORATORY RESULT (OUTPATIENT)
Age: 84
End: 2024-05-13

## 2024-05-16 ENCOUNTER — LABORATORY RESULT (OUTPATIENT)
Age: 84
End: 2024-05-16

## 2024-05-19 ENCOUNTER — RX RENEWAL (OUTPATIENT)
Age: 84
End: 2024-05-19

## 2024-05-19 RX ORDER — ALLOPURINOL 100 MG/1
100 TABLET ORAL DAILY
Qty: 60 | Refills: 3 | Status: ACTIVE | COMMUNITY
Start: 2022-12-19 | End: 1900-01-01

## 2024-05-20 ENCOUNTER — LABORATORY RESULT (OUTPATIENT)
Age: 84
End: 2024-05-20

## 2024-05-23 ENCOUNTER — OUTPATIENT (OUTPATIENT)
Dept: OUTPATIENT SERVICES | Facility: HOSPITAL | Age: 84
LOS: 1 days | Discharge: HOME | End: 2024-05-23

## 2024-05-23 ENCOUNTER — LABORATORY RESULT (OUTPATIENT)
Age: 84
End: 2024-05-23

## 2024-05-23 DIAGNOSIS — D46.9 MYELODYSPLASTIC SYNDROME, UNSPECIFIED: ICD-10-CM

## 2024-05-23 DIAGNOSIS — Z98.890 OTHER SPECIFIED POSTPROCEDURAL STATES: Chronic | ICD-10-CM

## 2024-05-23 DIAGNOSIS — Z86.79 PERSONAL HISTORY OF OTHER DISEASES OF THE CIRCULATORY SYSTEM: Chronic | ICD-10-CM

## 2024-05-24 ENCOUNTER — NON-APPOINTMENT (OUTPATIENT)
Age: 84
End: 2024-05-24

## 2024-05-24 ENCOUNTER — APPOINTMENT (OUTPATIENT)
Dept: CARDIOLOGY | Facility: CLINIC | Age: 84
End: 2024-05-24
Payer: MEDICARE

## 2024-05-24 VITALS
HEIGHT: 67 IN | SYSTOLIC BLOOD PRESSURE: 126 MMHG | DIASTOLIC BLOOD PRESSURE: 84 MMHG | WEIGHT: 178 LBS | HEART RATE: 85 BPM | OXYGEN SATURATION: 99 % | BODY MASS INDEX: 27.94 KG/M2

## 2024-05-24 DIAGNOSIS — Z87.891 PERSONAL HISTORY OF NICOTINE DEPENDENCE: ICD-10-CM

## 2024-05-24 PROCEDURE — 99214 OFFICE O/P EST MOD 30 MIN: CPT

## 2024-05-24 PROCEDURE — G2211 COMPLEX E/M VISIT ADD ON: CPT

## 2024-05-24 NOTE — ED PROVIDER NOTE - SKIN, MLM
Received call from patient.  Patient stated she forgot to discuss with you yesterday that she would like to come off of her primidone.  Patient stated it was originally ordered by a neurologist for essential tremors and seizure prevention.  Patient stated she hasn't had a seizure in 10-20 years.  Patient stated now that she is not working she doesn't mind if she has tremors, stating she wants to try to get off some more of her medications and would like to start with primidone.  Patient would like to know if Dr. Pinto thinks it safe for her to come off primidone.    Patient stated she was also reviewing her note from yesterday's appointment and patient wanted to point out that she does not take trazodone, but does take tramadol.  Patient stated she hasn't taken trazodone in years.    Patient also stated she needed refill of levothyroxine, sent in refill encounter.    Please advise.   Yellowish tinge to skin

## 2024-05-28 ENCOUNTER — LABORATORY RESULT (OUTPATIENT)
Age: 84
End: 2024-05-28

## 2024-05-28 NOTE — PHYSICAL EXAM
[Normal Breath Sounds] : Normal breath sounds [Normal Heart Sounds] : normal heart sounds [General Appearance - Well Developed] : well developed [Normal Appearance] : normal appearance [Well Groomed] : well groomed [General Appearance - Well Nourished] : well nourished [No Deformities] : no deformities [General Appearance - In No Acute Distress] : no acute distress [Normal Oral Mucosa] : normal oral mucosa [No Oral Pallor] : no oral pallor [No Oral Cyanosis] : no oral cyanosis [Normal Jugular Venous A Waves Present] : normal jugular venous A waves present [Normal Jugular Venous V Waves Present] : normal jugular venous V waves present [No Jugular Venous Stearns A Waves] : no jugular venous stearns A waves [Exaggerated Use Of Accessory Muscles For Inspiration] : no accessory muscle use [Respiration, Rhythm And Depth] : normal respiratory rhythm and effort [Auscultation Breath Sounds / Voice Sounds] : lungs were clear to auscultation bilaterally [Heart Rate And Rhythm] : heart rate and rhythm were normal [Heart Sounds] : normal S1 and S2 [Abdomen Soft] : soft [Abdomen Tenderness] : non-tender [] : no hepato-splenomegaly [Abdomen Mass (___ Cm)] : no abdominal mass palpated [Cyanosis, Localized] : no localized cyanosis [Oriented To Time, Place, And Person] : oriented to person, place, and time [Affect] : the affect was normal [Mood] : the mood was normal [No Anxiety] : not feeling anxious [JVD] : no jugular venous distention  [FreeTextEntry1] : see above

## 2024-05-28 NOTE — REASON FOR VISIT
[Follow-Up - Clinic] : a clinic follow-up of [FreeTextEntry2] : [facility] [FreeTextEntry1] : 5/24  Since last visit patient reports R medial foot near posterior heel with wound (reports began 2-3 weeks ago)  L 4th digit wound.  Healed R 2nd to 4th digit eschar. Healed L 3rd digit wound.    R PICC line for MDS treatment.   LE arterial duplex 4/2024: *  Significant disease below the knees with bilateral monophasic and tardus parvus waveforms. *  Collateral vessel formation is noted suggesting nondocumented occlusive disease.  Medications: Crestor 10 mg daily Gabapentin Omeprazole Acyclovir Norvasc 5 mg daily Furosemide 40 mg daily Allopurinol ASA  Plavix   3/27  Since last visit patient reports injured right shin last weekend.  Also has eschar to R 2-4 digits, and L 3rd digit. Denies fever / chills. LE arterial duplex ordered last visit, was not performed.   TTE 2/2024  1. Left ventricular wall thickness is normal. Left ventricular systolic function is mildly decreased with an ejection fraction of 46 % by Andersen's method of disks. Regional wall motion abnormalities present.  2. Basal and mid inferior wall, basal and mid inferolateral wall, and basal anterolateral segment are abnormal.  3. There is calcification of the aortic valve leaflets. There is moderate aortic stenosis. The peak transaortic velocity is 2.30 m/s, peak transaortic gradient is 21.2 mmHg and mean transaortic gradient is 12.0 mmHg with an LVOT/aortic valve VTI ratio of 0.31. The aortic valve area is estimated at 1.06 cm by the continuity equation. There is mild aortic regurgitation.  4. There is mild (grade 1) left ventricular diastolic dysfunction, with normal filling pressure.  5. Normal right ventricular cavity size, wall thickness, and normal systolic function.  6. Mild mitral regurgitation.  7. No pericardial effusion seen.  8. Compared to the transthoracic echocardiogram performed on 10/3/2022, The aortic stenosis is now moderate.  3/19 Patient reports feeling improved. Reports residual LE edema. Continue Lasix daily. Office visit next week. Will check labs during that visit.  3/6 BNP elevated. Last CXR with pulmonary congestion noted. Currently reports taking Lasix 40 mg three times weekly. Increase Lasix 40 mg daily.  12/13  Patient currently without C/P or SOB. No LE edema. R hallux and 3rd digit wounds. L hallux wound.   Cr. improved to 1.8. Platelet count currently 150. Last Hgb above 10.  Hospital Course 11/20-11/27  Patient admitted for acute hypoxic respiratory failure due to decompensated heart failure. Labs remarkable for pancytopenia (QBC 1.63, Hgb 8.1, Plt 100) and proBNP 6700. TTE showed newly reduced EF of 40%, moderate LV diastolic dysfunction, moderate AS, normal RV size and function, basal and mid anterolateral wall, basal and mid inferior wall, basal and mid inferolateral wall, and basal anteroseptal segment are abnormal. CTA C/A/P showed small bilateral pleural effusions with mild interlobular septal thickening which may reflect mild pulmonary interstitial edema and compressive bilateral lower lobe atelectasis  Patient initially required BIPAP for increased work of breathing and tachypnea. He was started on IV diuretics and was eventually weaned to room air. Cardiology consulted for ischemic evaluation due to newly reduced EF seen on echocardiogram. Patient developed JUAN RAMON on CKD so LHC was deferred. JUAN RAMON thought to be secondary to contrast induced nephropathy. Kidney and bladder US showed atrophic right kidney, no hydronephrosis. On discharge, serum creatinine was 2.24.  Patient had intermittent episodes of atrial tachycardia while hospitalized. Home metoprolol succinate was increased to 150 mg XL.  Heme was consulted for pancytopenia and recommended no inpatient chemo. Patient received 1 unit of pRBCs to maintain Hgb>8. He showed no signs or symptoms of bleeding.  PT evaluated patient and recommended NERI. Patient declined NERI and is being discharged home with home PT.   Medication Changes: -Increased metoprolol succinate to 150 mg XL daily. -Stopped cilostazol due to black box warning for heart failure.   Medications:  acyclovir 400 mg oral tablet: 1 tab(s) orally 2 times a day allopurinol 100 mg oral tablet: 1 tab(s) orally once a day amLODIPine 5 mg oral tablet: 1 tab(s) orally once a day aspirin 81 mg oral capsule: 1 cap(s) orally once a day cholecalciferol oral tablet: 2000 unit(s) orally once a day cyclobenzaprine 5 mg oral tablet: 1 tab(s) orally 3 times a day as needed for back pain fluconazole 200 mg oral tablet: 1 tab(s) orally once a day furosemide 40 mg oral tablet: 1 tab(s) orally once a day gabapentin 100 mg oral capsule: 3 cap(s) orally 3 times a day metoprolol succinate 50 mg oral tablet, extended release: 3 tab(s) orally once a day omeprazole 20 mg oral delayed release tablet: 1 tab(s) orally once a day Plavix 75 mg oral tablet: 1 tab(s) orally once a day rosuvastatin 10 mg oral tablet: 1 tab(s) orally once a day sertraline 25 mg oral tablet: 1 tab(s) orally once a day (at bedtime)   11/15  Patient with wounds to the toes. Recommend ER admission (patient refusing). Recommend LE arterial duplex and CHER this week. Podiatry evaluation this week.  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 Since last visit he was hospitalized for new onset Afib and anemia, course complicated acute gout flare, neutropenic fever, transaminitis, and JUAN RAMON on CKD.  Since his discharge from the hospital, he has had two vagal episodes with Afib RVR which is now being controlled by Metoprolol.  On Procrit for anemia.  Currently denies C/P or SOB, at rest or on ambulation.  Denies recent LOC. Reports history of LE edema which is improved with elevation. No lower extremity wounds. Uses a walker for ambulation.  Zio patch 2/17 showed: 8 runs SVT, longest 16 beats, frequent PVCs 5.5%, ventricular bigeminy and trigeminy.  LE venous duplex in 2/2023 showed no DVT.  TTE in 1/2023 showed: 1. Mitral annular calcification, otherwise normal mitral valve. Mild-moderate mitral regurgitation. 2. Calcified trileaflet aortic valve with decreased opening. Peak transaortic valve gradient equals 29 mm Hg, mean transaortic valve gradient equals 20 mm Hg, estimated aortic valve area equals 1.4 sqcm (by continuity equation), aortic valve velocity time integral equals 59 cm, consistent with moderate aortic stenosis. Minimal aortic regurgitation.  3. Normal left ventricular internal dimensions and wall thicknesses. 4. Normal left ventricular systolic function. No segmental wall motion abnormalities. 5. Normal right ventricular size and function.  Last Cr. 1.8 on 10/2 (improved from prior) Last Hgb 10.1  Reports portacath being placed tomorrow for treatment of MDS.  Medications: Patient unaware to verify medications   2/27/23  Daughter called and noted patient with a second syncopal episode while on the toilet. No head injury. Currently VSS. Currently asymptomatic. Recommend ER admission.   2/17/2023 He was having a blood transfusion with hematology end of jan Was noted to have a fast heart rate Was admitted to [facility], for 1 week Had 3-4 blood transfusions there. ? afib  Meds: Allopurinol calcitriol coreg 6.25mg BID Cilostazol 100mg BID Colchicine 0.6 Folic acid Protonix senna   Aspirin and plavix stopped norvasc stopped  crestor stopped   4/6/2022 Recent admission - vertigo  Labs with Dr. Reddy - Creatinine is  2.18, Sodium 140 Hemoglobin was 8 *** (was anemic in hospital as well) BNP was 772 Echo Dec 2021:  mild AS.  normal LV function .  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 Has a 24 hour aid name is Trinity. Balance is ok, walks with a walker No falls Eating ok, no more vomiting  No wounds on the feet or toes.  No blood in the stool or the urine. He does not take iron he remains on aspirin and plavix.   10/6/2021  He is going for accupuncture it is helping somewhat Seen by Dr. Medel - he has spinal dz L3-S1 Undergoing accupunture no PT - he declined.  Otherwise he has no chest pain Breathing ok . Diuretic every other day, helps with leg swelling no wounds on the feet or toes.  Mild edema of the legs  Labs :  hemoglobin 11.1 (previously 9.9) Creatinine 2.10 (previously 2.5)     6/30/2021 Complains of calf pain with walking R hip pains Pins and needs bottom of right foot most recent creatinine is 2.39 He remains on cilostazol 50mg BID He remains on plavix BP is well controlled The left leg is not as bad as the right Complains of some ankle pains No open wounds or tissue loss on the feet.  No chest pain  no chest pressure.    3/31/2021  He had R Radial cath  of the RCA otherwise 60% stenosis No chest pain  no angina he has mild to mod claudcation both legs, no rest pain.  not really active.  Will medically managae  Seen by Mohsen Cabral nephrology, all stable.    Medications: Amlodipine 5 mg Aspirin 81 Calcitriol  Coreg 2 5mg BID Plavix 75 mg daily  Colchicine 0.6 mg daily for gout.   Crestor 10 mg daily  Lasix 40mg every other day     BP at home is 160-170 at home.  His BP here is 148/75  He complains of leg pains, both, entire legs, with walking.     Furosemide 40 mg every other day    1/15/2021 Discussed arterial duplex results with patient, diffuse disease noted. [place] Class 3 claudication. Also reporting bilateral healed / healing sores. Plans for labs and office visit next week. Decision if to proceed with peripheral angiogram on office visit.  Call office if any worsening symptoms occurring.   Here for followup  12/2/2020  Had followed up with nephrology Dr. Cabral 2 weeks ago All good.  Had labwork done, states all is ok Needs tingling of the right leg, bottom of the foot Hard to move it  sometimes Not worse with walking he gets tired with walking.  The right is worse than the left.    Urinating no problems

## 2024-05-28 NOTE — ASSESSMENT
[FreeTextEntry1] : Assessment: 1. Renal artery stenosis -s/p L RA stent 2. HTN 3. CKD stage 3-4 4. PAD 5. CAD 6. History of Afib 7. MDS 8. Lower extremity wounds. 9. G/o admission for HFrEF  Plan: 1. Continue Lasix. 2. Continue antiplatelet therapy and statin. 3. Appreciate care by Dr. Park.      He has an office visit in 2 weeks for vascular follow-up and LE arterial duplex.     Healed R 2nd to 4th digit eschar. Healed L 3rd digit wound.     His R posterior near medial heel wound is superficial.      Monitor L 4th digit wound.     Avoid trauma to feet.  4. Continue care with Heme. 5. Due to wall motion abnormalities on TTE and reduced EF, will consider LHC later in time, will hold off for now due to CKD. If he needs a vascular procedure, we will reconsider cardiac evaluation. 6. Moderate AS. Plan for repeat TTE in 3 months at next visit.  7. Notify office if progression of lower extremity wounds. 8. Follow-up in 3 months. Call with any questions.   I, Dr. Gabriel Reddy, personally performed the evaluation and management (E/M) services for this established patient who presents today.  That E/M includes conducting the examination, assessing all new/exacerbated conditions, and establishing a new plan of care.  Today, my ACP, Sylvia Rincon, was here to observe my evaluation and management services for this new problem/exacerbated condition to be followed going forward.

## 2024-05-29 ENCOUNTER — APPOINTMENT (OUTPATIENT)
Dept: HEMATOLOGY ONCOLOGY | Facility: CLINIC | Age: 84
End: 2024-05-29
Payer: MEDICARE

## 2024-05-29 ENCOUNTER — RESULT REVIEW (OUTPATIENT)
Age: 84
End: 2024-05-29

## 2024-05-29 VITALS
RESPIRATION RATE: 16 BRPM | WEIGHT: 185.85 LBS | BODY MASS INDEX: 29.11 KG/M2 | DIASTOLIC BLOOD PRESSURE: 82 MMHG | TEMPERATURE: 98.6 F | SYSTOLIC BLOOD PRESSURE: 148 MMHG | HEART RATE: 93 BPM | OXYGEN SATURATION: 97 %

## 2024-05-29 LAB
ANISOCYTOSIS BLD QL: SLIGHT — SIGNIFICANT CHANGE UP
BASOPHILS # BLD AUTO: 0.03 K/UL — SIGNIFICANT CHANGE UP (ref 0–0.2)
BASOPHILS NFR BLD AUTO: 1.1 % — SIGNIFICANT CHANGE UP (ref 0–2)
DACRYOCYTES BLD QL SMEAR: SLIGHT — SIGNIFICANT CHANGE UP
ELLIPTOCYTES BLD QL SMEAR: SLIGHT — SIGNIFICANT CHANGE UP
EOSINOPHIL # BLD AUTO: 0.09 K/UL — SIGNIFICANT CHANGE UP (ref 0–0.5)
EOSINOPHIL NFR BLD AUTO: 3.4 % — SIGNIFICANT CHANGE UP (ref 0–6)
HCT VFR BLD CALC: 30.4 % — LOW (ref 39–50)
HGB BLD-MCNC: 10.3 G/DL — LOW (ref 13–17)
IMM GRANULOCYTES NFR BLD AUTO: 1.5 % — HIGH (ref 0–0.9)
LYMPHOCYTES # BLD AUTO: 1.09 K/UL — SIGNIFICANT CHANGE UP (ref 1–3.3)
LYMPHOCYTES # BLD AUTO: 41.6 % — SIGNIFICANT CHANGE UP (ref 13–44)
MACROCYTES BLD QL: SLIGHT — SIGNIFICANT CHANGE UP
MCHC RBC-ENTMCNC: 33.9 G/DL — SIGNIFICANT CHANGE UP (ref 32–36)
MCHC RBC-ENTMCNC: 39 PG — HIGH (ref 27–34)
MCV RBC AUTO: 115.2 FL — HIGH (ref 80–100)
MONOCYTES # BLD AUTO: 0.21 K/UL — SIGNIFICANT CHANGE UP (ref 0–0.9)
MONOCYTES NFR BLD AUTO: 8 % — SIGNIFICANT CHANGE UP (ref 2–14)
NEUTROPHILS # BLD AUTO: 1.16 K/UL — LOW (ref 1.8–7.4)
NEUTROPHILS NFR BLD AUTO: 44.4 % — SIGNIFICANT CHANGE UP (ref 43–77)
NRBC # BLD: 0 /100 WBCS — SIGNIFICANT CHANGE UP (ref 0–0)
PLAT MORPH BLD: NORMAL — SIGNIFICANT CHANGE UP
PLATELET # BLD AUTO: 156 K/UL — SIGNIFICANT CHANGE UP (ref 150–400)
POIKILOCYTOSIS BLD QL AUTO: SLIGHT — SIGNIFICANT CHANGE UP
RBC # BLD: 2.64 M/UL — LOW (ref 4.2–5.8)
RBC # FLD: 19.9 % — HIGH (ref 10.3–14.5)
RBC BLD AUTO: ABNORMAL
WBC # BLD: 2.62 K/UL — LOW (ref 3.8–10.5)
WBC # FLD AUTO: 2.62 K/UL — LOW (ref 3.8–10.5)

## 2024-05-29 PROCEDURE — 99213 OFFICE O/P EST LOW 20 MIN: CPT

## 2024-05-29 RX ORDER — CALCITRIOL 0.5 UG/1
0.5 CAPSULE, LIQUID FILLED ORAL
Qty: 30 | Refills: 0 | Status: COMPLETED | COMMUNITY
Start: 2021-05-03 | End: 2024-05-29

## 2024-05-29 NOTE — PROGRESS NOTE ADULT - ASSESSMENT
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 Hematology is consulted for MDS on Lenalidomide    # MDS with   - Follows with Dr. 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 Follows with Dr. Mcmahon.    - Recently started on Revlimid 5 mg daily 12/30/22. Revlimid and Allopurinol was on hold for 2 week   - Continue to hold Revlimid.    #Anemia  #Thrombocytopenia  Likely combined etiology of MDS (decreased production of both) and blood loss consumptive thrombocytopenia)  - (acute worsening from ~9 to 6.8), macrocytic,   - s/p 2U PRBC.  - FOBT is positive. Retic 0.8%.   - Monitor daily CBC+Diff  - Maintain Hb >7, PLT>20K if no bleeding  -Active type and screen   - Continue to hold lenalidomide  - Appreciate GI input.    Case d/w Dr. Ashby.    Purnima Johnson M.D.  Hematology and Medical Oncology Fellow  Pager: [phone]  For weekends and evenings (5 pm - 8 am), please page Heme/Onc fellow on call.   [FreeTextEntry1] : 9y/o M with papular diffuse pruritic rash with some pustules.  Unclear origin.  Possibly amoxicillin drug rash given that it started 4d after a 10d amoxicillin course.  At that time was negative for strep.  Rapid strep is negative today.  - Discontinue all other creams.  Use fragrance free soap and Vaseline.  May use cool compress or keep topicals in refrigerator.  Zyrtec daily.  Benadryl once daily as needed.  Hydrocortisone ointment prn no more than 5 days. - Allergist referral - Return precautions given including for fever, vesicular eruption, worsening of symptoms, or other concerns.  Seek immediate medical attention for any SOB or emesis. - Return/ED precautions given.  RTC routine and prn.  Caretaker expressed understanding and all questions answered.

## 2024-05-30 ENCOUNTER — LABORATORY RESULT (OUTPATIENT)
Age: 84
End: 2024-05-30

## 2024-05-30 LAB
ALBUMIN SERPL ELPH-MCNC: 4.3 G/DL
ALP BLD-CCNC: 98 U/L
ALT SERPL-CCNC: 11 U/L
ANION GAP SERPL CALC-SCNC: 12 MMOL/L
AST SERPL-CCNC: 15 U/L
BILIRUB SERPL-MCNC: 0.6 MG/DL
BUN SERPL-MCNC: 20 MG/DL
CALCIUM SERPL-MCNC: 8.9 MG/DL
CHLORIDE SERPL-SCNC: 101 MMOL/L
CO2 SERPL-SCNC: 26 MMOL/L
CREAT SERPL-MCNC: 1.78 MG/DL
EGFR: 38 ML/MIN/1.73M2
GLUCOSE SERPL-MCNC: 161 MG/DL
LDH SERPL-CCNC: 211 U/L
POTASSIUM SERPL-SCNC: 4.9 MMOL/L
PROT SERPL-MCNC: 6.8 G/DL
SODIUM SERPL-SCNC: 140 MMOL/L

## 2024-06-03 ENCOUNTER — LABORATORY RESULT (OUTPATIENT)
Age: 84
End: 2024-06-03

## 2024-06-04 NOTE — RESULTS/DATA
[FreeTextEntry1] : 5/30/2024 WBC 2.62 Hgb 10.3 Platelet 156k  4/46/2024 Most recent labs from 4/22/2024 HGB 10.5 <10.2 <...11.1 WBC 4.26  < 180   CMP form  WNL except Cr 1.73   4/8/2024 WBC 3.69 Hgb 11.4 Platelet 301 Normal LDH CMP w/ Cr 1.9, eGFR 35   3/13/2024 WBC 3.05 Hgb 11.6 Platelets 265 Normal LDH and UA Cr 1.99, eGFR 33  2/12/2024  HGB 11.3   WBC 3.19  BONE MARROW  	 Patient:     MASOOD MACARIO   Accession:                             [id number]  Collected Date/Time:                   2/2/2024 11:08 EST Received Date/Time:                    2/3/2024 11:15 EST  Hematopathology Report - Auth (Verified)  Specimen(s) Submitted 1. Bone marrow biopsy MH 2. Bone marrow aspirate for Flow OP  Final Diagnosis 1, 2. Bone marrow biopsy and bone marrow aspirate      - Cellular bone marrow with   erythroid predominant  trilineage  hematopoiesis, reduced  myelopoiesis and  adequate  megakaryopoiesis See note and description.  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  Current biopsy shows cellular bone marrow with    erythroid predominant  trilineage  hematopoiesis, reduced  myelopoiesis and adequate  megakaryopoiesis  with few small  hypolobated forms. Aspirate smears show  dysplastic  features in erythroid  elements  including nuclear irregularities, budding and   megloblastoid  changes.  Suggest correlation with pending   cytogenetics , FISH and myeloid  NGS panel. Comprehensive report with results of pending ancillary studies to follow.  Ancillary studies Bone marrow aspirate iron stain:   Iron stores are increased; ring  sideroblasts are not increased. Flow  cytometry ([id number]):     -  The lymphocyte   immunophenotypic findings show no diagnostic abnormalities.     -  Myeloblasts (0.65% of cells), positive for   myeloperoxidase , HLA-DR, CD34, , CD33, CD13, partial dim CD7; negative for CD15, CD16, CD64, CD2, CD4.      - The  myeloid immunophenotypic  findings show normal  myeloid granularity, no increase in   myeloid immaturity, and normal  myeloid antigen maturation pattern, and the presence of    hematogones (which are reported to be low in   myelodysplasia). Immunohistochemical  stains:  Immunostains CD34, ,  Myeloperoxidase , CD15, CD71, E- cadherin , CD61, Factor VIII, p53 are performed on block 1A. CD34 stains less than 1% cells.  stains increased scattered mast cells.  Myeloperoxidase , CD15 highlight  myeloid elements. CD71 stains many clusters of   erythroid elements and show  erythroid predominance. E- cadherin  stains few pronormoblasts . CD61, factor VIII stains  megakaryocytes  and highlight small  hypolobated forms. p53 (dim) stains few scattered cells. Cytogenetics : Pending FISH:  Pending  Microscopic description: 1. Biopsy:  Sections of bone marrow biopsy and bone marrow fragments in clot show variable  cellularity  (5% -50% cellularity ) in a fragmented and hemorrhagic core biopsy,   erythroid predominant  trilineage hematopoiesis with maturation, reduced   myelopoiesis. M:E ratio is reduced.  Megakaryocytes  are normal in number with occasional small  hypolobated forms. Iron stores are increased.  2. Aspirate:  Spicular  and cellular; adequate for interpretation. Maturing and mature  myeloid and  erythroid elements are present.  Erythroid  elements are markedly increased.   Erythroid elements show progressive maturation with  dysplastic features including nuclear irregularities, budding and  megaloblastoid changes. M:E ratio (0.3:1) is reduced.    Megakaryocytes appear normal in number and occasional small forms are present. Many scattered mast cells are present.  	 Flow Cytometry Final Report ________________________________________________________________________ Specimen: Bone marrow aspirate Date Collected: 02/02/2024 Date Received: 02/02/2024 Date Processed: 02/02/2024 Date Reported: 02/07/2024 16:45 Accession #: [id number] ________________________________________________________________________ CLINICAL DATA: Clinical history of myelodysplastic syndrome, rule out acute myeloid leukemia  ________________________________________________________________________ CD45/Side Scatter Differential Granulocytes: 45 % ;    Lymphocytes: 29 % ;    Monocytes: 4 % ;    Dim CD45 and/or blast gate: 5 % ;    Erythroid/debris: 15 % ________________________________________________________________________ DIAGNOSIS: Bone marrow aspirate:     -  The lymphocyte immunophenotypic findings show no diagnostic abnormalities.     - Myeloblasts (0.65% of cells), positive for myeloperoxidase, HLA-DR, CD34, , CD33, CD13, partial dim CD7; negative for CD15, CD16, CD64, CD2, CD4.      - The myeloid immunophenotypic findings show normal myeloid granularity, no increase in myeloid immaturity, and normal myeloid antigen maturation pattern, and the presence of hematogones (which are reported to be low in myelodysplasia). Please see interpretation.  INTERPRETATION: MORPHOLOGY: Maturing trilineage hematopoiesis CYTOSPIN: Maturing and mature myeloid elements with no significant lymphocytosis or immaturity; pronormoblasts present.  IMMUNOPHENOTYPE: Lymphocytes (29% of cells): Heterogeneous population of T-cells (with normal CD4 to CD8 ratio, including CD57+ natural killer-like T-cells, gamma/delta T-cells), natural killer cells, and polytypic B-cells. The lymphocyte immunophenotypic findings show no diagnostic abnormalities.  Myeloblasts (0.65% of cells), positive for myeloperoxidase, HLA-DR, CD34, , CD33, CD13, partial dim, CD7; negative for CD15, CD16, CD64, CD2, CD4.  CD45/side scatter shows 0.65% myeloblast population and normal myeloid granularity. There is no increase in CD34, CD14/CD64 or  positive cells. Myeloid antigen pattern is normal with CD13, CD16, CD11b, CD15, and CD33. Granulocytes express CD56.  Hematogones are present. The myeloid immunophenotypic findings show normal myeloid granularity, no increase in myeloid immaturity, and normal myeloid antigen maturation pattern, and the presence of hematogones (which are reported to be low in myelodysplasia). _____________________________________________________________________  PATHOLOGY: MOLECULAR   [employer] Advanced NGS Myeloid Panel Final Report  RESULT SUMMARY: ABNORMAL  DETECTED GENOMIC ALTERATIONS:   Tier I: Variants of Strong Clinical Significance   SF3B1 p.[id number]   Tier II: Variants of Potential Clinical Significance   DNMT3A p.?   Tier III: Variants of Unknown Clinical Significance   KIT p.[id number]   12/7/2023 HGB 10.4 WBC 3.47    9/18/2023  (Before and after stopping Revlimid)  WBC 1.14 > 6.08 Hgb 7.3  > 8.8  > 210  8/9/2023 Most recent blood work from 8/9/2023 WBC 1.14  Hgb 7.3     7/2/2023 Hgb 9.9, .4 WBC platelet normal  Cr 2.3 eGFR 28  Hypokalemic on 6/28 3.3  5/22/2023 WBC and platelet WNL Hgb 11.1  .9 CMP from 5/4-  Cr 1.79, eGFR 38 Pending CMP LDH UA today  3/24/2023 WBC 6.28 Hgb 10.2 .7 Platelet 224 Last Cr (2/24/23) - 2.42 Pending repeat CBC, CMP, LDH, UA  2/24/2023 WBC 2.62 Hgb 8.8 Platelet 128 Pending CMP, LDH, UA  1/26/2023 Last Hgb 9.1, Cr 2.3 (12/16/2022)    12/16/2022 HGB on 10/31/2022; 8.3,  Bone marrow biopsy:  Patient:   AMIRAH MASOOD   Accession:                             [id number]  Collected Date/Time:                   10/31/2022 16:49 EDT Received Date/Time:                    10/31/2022 16:50 EDT  Hematopathology Addendum Report - Auth (Verified)  Hematopathology Addendum Additional immunohistochemical stains (block 1A: p53, ) show  increased  positive interstitial mast cells without aggregates. TP53  shows less than 1% bright positive cells.  There is no change in the diagnosis.  Verified by: Brooklynn Erazo (Electronic Signature) Reported on: 11/17/22 09:24 [facility], [facility], [address] [place] [address]. [place] [address] Phone: [phone]   Fax: [phone] _________________________________________________________________  Disclaimer Slide(s) with built in immunohistochemical study control(s) and negative  control associated with this case has/have been verified by the sign out  pathologist.  For slide(s) without built in controls positive control slides has/have  been reviewed and approved by immunohistochemistry lab  These immunohistochemical/ in-situ hybridization tests have been developed  and their performance characteristics determined by [facility], Department of Pathology, Division of Immunopathology,  [address] [place] [address].  It has not been cleared  or approved by the U.S. Food and Drug Administration.  The FDA has  determined that such clearance or approval is not necessary.  This test  is used for clinical purposes.  The laboratory is certified under the  CLIA-88 as qualified to perform high complexity clinical testing. Hematopathology Addendum Report - Auth (Verified)  Hematopathology Addendum       Comprehensive Hematopathology Report  Final Diagnosis : 1, 2. 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 The bone marrow  shows hypercellularity with erythroid hyperplasia and increased ring  sideroblasts and dysplastic megakaryocytosis. Interstitial mast cells  are increased with normal morphology, few CD25 positive, negative CD30.  The findings show myelodysplastic syndrome with multilineage dysplasia  (hypolobulated megakaryocytes) and ring sideroblasts. Correlation with  cytogenetics, FISH, and somatic mutation analysis to evaluate for complex  karyotype, del(5q), -7, del(7q) SF3B1, TP53, other abnormalities is  done. Please note findings of an   abnormal male karyotype, 46,XY,del(5)(q15q33) [13]/46,XY[7],  a positive MDS FISH panel and [employer] Advanced NGS  Myeloid Report showing   Tier I: Variants of Strong Clinical Significance:  SF3B1 p.[id number] (VAF: 39%),   Tier II: Variants of Potential Clinical   Significance: DNMT3A p.?  (VAF: 4%), Tier III: Variants of Unknown  Clinical Significance:   KIT p.Yhq319Qcl  (VAF: 50%) . Based on the  additional findings, the MDS classification (ICC) is MDS with del(5q) and  with WHO is MDS with low blasts and 5q deletion.  Dr. Mcmahon was notified of the diagnosis on 11/07/2022.  Morphology: Microscopic description: 1. Biopsy:   Sections of bone marrow biopsy and bone marrow fragments in  clot show hypercellularity (50-85% cellularity), erythroid predominance  with maturation and increased pronormoblasts, maturing and mature  myeloid elements, megakaryocytes at least normal in number with abnormal  morphology (increased hypolobulated/small forms), increased interstitial  mast cells without aggregates, and iron stores increased.  2. Aspirate:   Cellular spicules are present, adequate for interpretation.   Maturing and mature myeloid and erythroid elements are present with  erythroid predominance (M:E ratio (1.16:1).  Megakaryocytes appear at  least normal in number with small/hypolobulated morphology. Mast cells  are increased in the spicules (round and normally granular).  Bone Marrow Aspirate Differential: (100 Cells). Type  % Normal* Blast  0% 0-3 Neutrophil and    Precursors   47% 33-63 Eosinophil  3% 1-5 Basophil  0% 0-1 Pronormoblast  5% 0-2 Normoblast  43% 15-25 Monocyte  0% 0-2 Lymphocyte  7% 10-15 Plasma cell  0% 0-1   *Adult Range  Comment Iron stain (examined to evaluate for iron stores; see microscopic  description) and Giemsa stain (shows appropriate staining pattern) are  performed and evaluated on block(s): 1A, 1B  Ancillary Studies: Bone marrow aspirate iron stain:   Iron stores are increased; numerous ring  sideroblasts are present (greater than 15%).  Flow cytometry:   The myeloid immunophenotypic findings show decreased  myeloid granularity, no increase in myeloid immaturity (myeloblasts,  0.32% of cells, with normal immunophenotype), normal myeloid antigen  maturation pattern, few mast cells, and the presence of hematogones  (which are reported to be low in myelodysplasia). The lymphocyte immunophenotypic findings show no diagnostic abnormalities.  Immunohistochemical stains   (block 1A: CD34, , myeloperoxidase,  CD71, E-cadherin, factor VIII, CD15, CD61, CD25, CD30, p53) show no  increase in CD34 positive cells (less than 3%), erythroid predominance  (positive with CD71), with clusters of pronormoblasts (positive with  E-cadherin); diminished myeloid elements with maturation (positive   with myeloperoxidase and CD15), and increased megakaryocyte number with  dysplastic, small/hypolobulated morphology (positive with factor VIII,  CD61). CD30 is negative in mast cells; a few show dim CD25 positivity.  Cytogenetics:  [id number] Date Collected:  10/31/2022 Result:  Abnormal male karyotype Karyotype: 46,XY,del(5)(q15q33)[13]/46,XY[7]  Fluorescence in situ Hybridization (FISH):    [id number] Result: ABNORMAL FISH MDS PANEL - 5q deletion detected (65%) Probe(s) and Location(s):   E8T963/ D5S23 (5p15.2), EGR1 (5q31), D7Z1  (7p11.1-q11.1), U4L080 (7q31), CEP-8/D8Z2   ? (8p11.1-q11.1), Z29L953  (20q12) ISCN Nomenclature:  nuc clif(J3X160/P6O51w1,EGR1x1)[130/200],  (D7Z1,W7V007)x2[200], (D8Z2,U47K850)x2[197/200], (TP53x2)[197/200]  Molecular Analyses: [facility] Advanced NGS Myeloid Report Specimen ID:  [id number] Date Collected:  10/31/2022 Specimen Source:  Bone Marrow  RESULT SUMMARY:  ABNORMAL DETECTED GENOMIC ALTERATIONS: Tier I: Variants of Strong Clinical Significance SF3B1 p.[id number] Tier II: Variants of Potential Clinical Significance DNMT3A p.? Tier III: Variants of Unknown Clinical Significance KIT p.Uvy228Xeg  PERTINENT NEGATIVE RESULTS: The following genes are NEGATIVE for clinically relevant mutations.  Mutational hotspots and surrounding exonic regions were interrogated for  DNA level point mutations and indels (fusions not assayed). ABL1, ANKRD26, ASXL1, ATRX, BCOR, BCORL1, BRAF, CALR, CBL, CCND2, CDKN2A,  CEBPA, CSF3R, CUX1, DDX41, ETNK1, ETV6, EZH2, FBXW7, FLT3, GATA2, HRAS,  IDH1, IDH2, JAK2, KDM6A, KMT2A, KRAS, MAP2K1, MPL, MYD88, NF1, NPM1,  NRAS, PDGFRA, PHF6, PTEN, PTPN11, RUNX1, SETBP1, SRSF2, STAG2, TET2,  TP53, U2AF1, WT1, ZRSR2 TECHNICAL SUMMARY : DNMT3A p.? c.2174-1G>A 4% allele frequency Exon 19 [id number]  SF3B1 p.Zir543Sjz c.1998G>C 39% allele frequency Exon 14 [id number]   KIT p.Txm628Tuv c.1879C>T 50% allele frequency Exon 12 [id number]_[id number]  Clinical History/Data  : History of macrocytic anemia with CKD rule out primary bone marrow  disorder  CBC, 10/31/2022  Test Code       Result         Reference                  Range WBC             5.75 K/uL      3.80 - 10.50 RBC             2.34 M/uL L    4.20 - 5.80 HGB             8.3 g/dL L     13.0 - 17.0 HCT             25.4 % L       39.0 - 50.0 MCV             108.5 fl H     80.0 - 100.0 MCH             35.5 pg H      27.0 - 34.0 MCHC            32.7 g/dL      32.0 - 36.0 RDW             18.5 % H       10.3 - 14.5 PLT             236 K/uL       150 - 400 Auto NRBC       0 /100 WBCs    0 - 0 NEUT%           63.5 %         43.0 - 77.0  NEUT#           3.65 K/uL      1.80 - 7.40 LYMPH%          20.5 %         13.0 - 44.0 LYMPH#          1.18 K/uL      1.00 - 3.30 MONO%           7.0 %          2.0 - 14.0 MONO#           0.40 K/uL      0.00 - 0.90 EOS%            3.3 %          0.0 - 6.0 EOS#            0.19 K/uL      0.00 - 0.50 BASO%           1.4 %          0.0 - 2.0 BASO#           0.08 K/uL      0.00 - 0.20 BUN             28 mg/dL H     7 - 23 Creatinine      2.32 mg/dL H   0.50 - 1.30  Verified by: Brooklynn Erazo (Electronic Signature) Reported on: 11/09/22 16:40 [facility], [facility], [address] [place] [address] Phone: [phone]   Fax: [phone]  10/27/2022 (Labs on 10/18/2022): Hgb stable at 9.0, normal WBC, platelets Creatinine 2.32 eGFR 28   5/12/2022 Available labs reviewed.  Macrocytic anemia, no etiology could be determined.

## 2024-06-04 NOTE — HISTORY OF PRESENT ILLNESS
[Treatment Protocol] : Treatment Protocol [de-identified] : CHART REVIEW: 81-year-old Mr. MACARIO is seen in consult for macrocytic anemia (Hgb 9.5, MCV ~125). Patient has multiple medical conditions including stage-3 CKD. He has no symptoms of anemia and has no complaints.  [FreeTextEntry1] : Dacogen q28 days. C1: 10/2/23 - 10/6/23. C2: 10/30/23 - 11/3/23. C3:12/11/23 - 12/15/23 (delayed; cancelled). C4: 1/8/24-1/12/24. C5: 2/12/24-2/16/24. C6: 3/11/23-3/15/24. C7: 4/8/24-4/12/24. C8: 5/6-5/10. [de-identified] : 05/12/22: 81 year-old Mr. MACARIO is seen in consult for macrocytic  anemia (Hgb 9.5, MCV ~125). Patient has multiple medical conditions including stage-3 CKD. He has no symptoms of anemia and has no complaints.   10/27/22: Patient presenting today for follow up for macrocytic anemia. Overall feels well. Endorses some fatigue and shortness of breath with activity. Denies headache, dizziness, Ambulates with walker. He is being followed by his nephrologist who manages his hypertension. Endorses taking diuretics every other day.    12/16/2022 Patient returns for a follow up. He endorses no change in his health status. 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 Patient has some symptoms of fatigue and tiredness.   1/26/2023 Patient returns for a follow up. He started taking Revlimid on 12/30/2022. About 2 weeks later he started developing side effects like-- loss  of appetite, body ache, constipation, pins and needle sensation. He also appears to have developed Jaundice.   2/24/2023 Patient seen in follow up, accompanied by Preeti ceja, at bedside. He had a recent hospitalization from 1/27 - 2/7 for new onset afib that was detected in the treatment room prior to his blood transfusion appointment. He was found to be in afib, anemic (Hgb 6.3) requiring 4 PRBCs throughout his stay. His course was c/b acute gout flare, neutropenic fever, transaminitis, and JUAN RAMON on CKD. 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 Had an episode of neutropenic fever started on Cefepime but stopped as per ID, most likely due to gout flare. UA + but asymptomatic, not treated. Bld cx neg x2. Syncopal episode while being on the commode yesterday 2/24. As per Preeti, EKG revealed rate controlled afib. Today, his HR was 127 in office. Denies dizziness, SOB, chest pain, palpitations. As per pt, was on a holter monitor that was submitted yesterday. He is f/u with Dr. Reddy (cards). He also have been having a dry cough for few days. + chills. No fever, body aches, night sweats. No sick contacts. Working with PT. Still feels weak, ambulating via wheelchair. Appetite is okay. Weight is stable.   3/24/2023 Patient seen in follow up. Accompanied by Preeti ceja at bedside. He has been responding well to 20k U Procrit weekly injections. Last Hgb 10.2, BP stable 111/58. Since his last visit, has been evaluated by cards for his afib (now rate controlled) currently on Metoprolol 25mg BID. He reports feeling much better since last encounter - he is now able to ambulate longer distances with a walker. Endorses unsteady gait (vertigo). Appetite is good, weight is stable.   5/22/2023 Patient seen in follow up. Accompanied by aide and daughter at bedside. Had a recent UTI, treated with Amoxicillin. He'll be following up with his PCP tomorrow. He endorses left flank pain that started after his UTI that is hindering him from ambulating. Describes the pain to be "shocking" and sharp in nature, pain reproduced by movements. His last MRI was in 2021 which revealed lumbar spondylosis with mild to moderate spinal canal narrowing. + peripheral neuropathy of his right lower ext. Currently on Gabapentin 200mg daily. His Hgb has been stable since April 27th. Last Hgb 11.1 last week. Otherwise, no complaints. Appetite is good and weight is stable.  7/5/2023 Patient seen in follow up. With aide and daughter at bedside. Interim, he's been feeling well. He completed first month of starting Revlimid without any side effects. His most recent CBC from last week 9.7, he plans on self administering Procrit today after the visit. He had MRI done 6/20 which showed degenerative vs inflammation of his lumbar spine. He continues to experience back pain and some scapular pain from pulled muscle last week. Recommended ortho / pain management for chronic back pain but pt denied at this time. He reports feeling weaker in his lower extremities but does not want to participate in PT at this time. Otherwise, feeling well. Appetite is good, weight is stable.   8/10/2023 Patient presents for a follow up. He appears pale. He feels weak and sleepy after taking the Lenalidomide pill. He is not doing any PT. He is not able to walk by himself yet. Hs anemia has become worse despite EUGENIA. Lenalidomide may not be helping to improve his disease but worsening the pancytopenia.    9/18/2023 Patient seen in follow up. He was taken off Revlimid in his last visit and his EUGENIA dose was increased to 40KU. His HGB increased to ~9g (from 7) and neutropenia and thrombocytopenia resolved. He looks and feels better. We discussed starting HMA.   12/7/2023 Patient presents for a follow up and evaluation for treatment. He completed 2 cycles of HMA (Dacogen). Before the 3rd cycle, he was admitted to the hospital for low O2Sat. He received IV abx for suspected sepsis. He was discharged a week ago. His physical condition has improved. His last transfusion (PRBC) was 3 weeks ago (11/2023). His Hgb is 10.4, platelets 175, WBC 3.47. He is continuing on Retacrit at home. He is holding his counts without transfusions. It appears that he is responding to the treatment.   2/12/2024  3/11/2024 Patient seen the tx room for a follow up appt. Today is C6D1. He is accompanied by Preeti, his aide and his son at bedside. Interim, he's been in good health. He is now able to climb up the stairs. He is slowly regaining his strength. He has been giving himself the Procrit despite his Hgb being > 11, we had a lengthy discussion again today going over the parameters for Procrit injections (ONLY ADMINISTER WHEN HGB < 11). The patient, as well as his aide and his son verbalized full understanding  4/8/2024 Patient seen in the tx room for a f/u. Today is C7D1 of Dacogen. He is accompanied by Preeti, his aide. Interim, he's been in good health. He has stopped his Procrit since his Hgb has been > 11. He reports that his legs feel weak and cannot climb the stairs well without falling. He plans to resume PT to regain his strength.  4/26/2024  Patient presents for a follow up for MDS on HMA. He also has CHK (eGFR 39) He has been on Decitabine monthly. He responded well to the treatment. He became transfusion independent, his counts normalized, He is not on EUGENIA or GCSF or TPO-RA. He slowly gained strength and now able to ambulate with a walker. He is going for a vascular surgery evaluation for possible stent in the LE vessels. He has no complaints.   5/29/2024 Patient seen in follow up. Accompanied by his HHA. Interim, he's been in good health. Tolerated his C8 with no issues. Counts have been stable, receiving Procrit for Hgb <11

## 2024-06-04 NOTE — PHYSICAL EXAM
[Ambulatory and capable of all self care but unable to carry out any work activities] : Status 2- Ambulatory and capable of all self care but unable to carry out any work activities. Up and about more than 50% of waking hours [Normal] : affect appropriate [de-identified] : seen in wheelchair [de-identified] : afib rate controlled  [de-identified] : unsteady gait, 2 ppl assist

## 2024-06-06 ENCOUNTER — LABORATORY RESULT (OUTPATIENT)
Age: 84
End: 2024-06-06

## 2024-06-06 ENCOUNTER — APPOINTMENT (OUTPATIENT)
Dept: VASCULAR SURGERY | Facility: CLINIC | Age: 84
End: 2024-06-06
Payer: MEDICARE

## 2024-06-06 VITALS
HEART RATE: 84 BPM | SYSTOLIC BLOOD PRESSURE: 102 MMHG | TEMPERATURE: 98.1 F | WEIGHT: 180 LBS | HEIGHT: 67 IN | BODY MASS INDEX: 28.25 KG/M2 | DIASTOLIC BLOOD PRESSURE: 60 MMHG

## 2024-06-06 DIAGNOSIS — I73.9 PERIPHERAL VASCULAR DISEASE, UNSPECIFIED: ICD-10-CM

## 2024-06-06 PROCEDURE — 99213 OFFICE O/P EST LOW 20 MIN: CPT

## 2024-06-06 PROCEDURE — 93975 VASCULAR STUDY: CPT

## 2024-06-07 ENCOUNTER — LABORATORY RESULT (OUTPATIENT)
Age: 84
End: 2024-06-07

## 2024-06-10 ENCOUNTER — APPOINTMENT (OUTPATIENT)
Dept: INFUSION THERAPY | Facility: HOSPITAL | Age: 84
End: 2024-06-10

## 2024-06-10 ENCOUNTER — RESULT REVIEW (OUTPATIENT)
Age: 84
End: 2024-06-10

## 2024-06-10 ENCOUNTER — APPOINTMENT (OUTPATIENT)
Dept: HEMATOLOGY ONCOLOGY | Facility: CLINIC | Age: 84
End: 2024-06-10

## 2024-06-10 ENCOUNTER — INPATIENT (INPATIENT)
Facility: HOSPITAL | Age: 84
LOS: 8 days | Discharge: HOME HEALTH | DRG: 948 | End: 2024-06-19
Attending: STUDENT IN AN ORGANIZED HEALTH CARE EDUCATION/TRAINING PROGRAM | Admitting: HOSPITALIST
Payer: MEDICARE

## 2024-06-10 ENCOUNTER — LABORATORY RESULT (OUTPATIENT)
Age: 84
End: 2024-06-10

## 2024-06-10 VITALS
OXYGEN SATURATION: 95 % | HEART RATE: 128 BPM | RESPIRATION RATE: 22 BRPM | SYSTOLIC BLOOD PRESSURE: 148 MMHG | HEIGHT: 62.6 IN | DIASTOLIC BLOOD PRESSURE: 71 MMHG

## 2024-06-10 DIAGNOSIS — Z86.79 PERSONAL HISTORY OF OTHER DISEASES OF THE CIRCULATORY SYSTEM: Chronic | ICD-10-CM

## 2024-06-10 DIAGNOSIS — Z98.890 OTHER SPECIFIED POSTPROCEDURAL STATES: Chronic | ICD-10-CM

## 2024-06-10 DIAGNOSIS — Z90.79 ACQUIRED ABSENCE OF OTHER GENITAL ORGAN(S): Chronic | ICD-10-CM

## 2024-06-10 DIAGNOSIS — Z98.49 CATARACT EXTRACTION STATUS, UNSPECIFIED EYE: Chronic | ICD-10-CM

## 2024-06-10 LAB
ALBUMIN SERPL ELPH-MCNC: 3.9 G/DL — SIGNIFICANT CHANGE UP (ref 3.3–5)
ALBUMIN SERPL ELPH-MCNC: 4.2 G/DL — SIGNIFICANT CHANGE UP (ref 3.3–5)
ALP SERPL-CCNC: 283 U/L — HIGH (ref 40–120)
ALP SERPL-CCNC: 369 U/L — HIGH (ref 40–120)
ALT FLD-CCNC: 231 U/L — HIGH (ref 10–45)
ALT FLD-CCNC: 232 U/L — HIGH (ref 10–45)
ANION GAP SERPL CALC-SCNC: 15 MMOL/L — SIGNIFICANT CHANGE UP (ref 5–17)
ANION GAP SERPL CALC-SCNC: 16 MMOL/L — SIGNIFICANT CHANGE UP (ref 5–17)
ANION GAP SERPL CALC-SCNC: 23 MMOL/L — HIGH (ref 5–17)
ANISOCYTOSIS BLD QL: SIGNIFICANT CHANGE UP
ANISOCYTOSIS BLD QL: SIGNIFICANT CHANGE UP
APPEARANCE UR: CLEAR — SIGNIFICANT CHANGE UP
APTT BLD: 27.5 SEC — SIGNIFICANT CHANGE UP (ref 24.5–35.6)
AST SERPL-CCNC: 164 U/L — HIGH (ref 10–40)
AST SERPL-CCNC: 184 U/L — HIGH (ref 10–40)
BACTERIA # UR AUTO: NEGATIVE /HPF — SIGNIFICANT CHANGE UP
BASE EXCESS BLDV CALC-SCNC: -0.2 MMOL/L — SIGNIFICANT CHANGE UP (ref -2–3)
BASE EXCESS BLDV CALC-SCNC: -0.2 MMOL/L — SIGNIFICANT CHANGE UP (ref -2–3)
BASOPHILS # BLD AUTO: 0 K/UL — SIGNIFICANT CHANGE UP (ref 0–0.2)
BASOPHILS # BLD AUTO: 0 K/UL — SIGNIFICANT CHANGE UP (ref 0–0.2)
BASOPHILS NFR BLD AUTO: 0 % — SIGNIFICANT CHANGE UP (ref 0–2)
BASOPHILS NFR BLD AUTO: 0 % — SIGNIFICANT CHANGE UP (ref 0–2)
BILIRUB SERPL-MCNC: 2.4 MG/DL — HIGH (ref 0.2–1.2)
BILIRUB SERPL-MCNC: 4.3 MG/DL — HIGH (ref 0.2–1.2)
BILIRUB UR-MCNC: ABNORMAL
BLD GP AB SCN SERPL QL: NEGATIVE — SIGNIFICANT CHANGE UP
BUN SERPL-MCNC: 24 MG/DL — HIGH (ref 7–23)
BUN SERPL-MCNC: 24 MG/DL — HIGH (ref 7–23)
BUN SERPL-MCNC: 25 MG/DL — HIGH (ref 7–23)
CA-I SERPL-SCNC: 1.08 MMOL/L — LOW (ref 1.15–1.33)
CA-I SERPL-SCNC: 1.09 MMOL/L — LOW (ref 1.15–1.33)
CALCIUM SERPL-MCNC: 8.5 MG/DL — SIGNIFICANT CHANGE UP (ref 8.4–10.5)
CALCIUM SERPL-MCNC: 8.8 MG/DL — SIGNIFICANT CHANGE UP (ref 8.4–10.5)
CALCIUM SERPL-MCNC: 9 MG/DL — SIGNIFICANT CHANGE UP (ref 8.4–10.5)
CAST: 2 /LPF — SIGNIFICANT CHANGE UP (ref 0–4)
CHLORIDE BLDV-SCNC: 100 MMOL/L — SIGNIFICANT CHANGE UP (ref 96–108)
CHLORIDE BLDV-SCNC: 102 MMOL/L — SIGNIFICANT CHANGE UP (ref 96–108)
CHLORIDE SERPL-SCNC: 100 MMOL/L — SIGNIFICANT CHANGE UP (ref 96–108)
CHLORIDE SERPL-SCNC: 100 MMOL/L — SIGNIFICANT CHANGE UP (ref 96–108)
CHLORIDE SERPL-SCNC: 102 MMOL/L — SIGNIFICANT CHANGE UP (ref 96–108)
CO2 BLDV-SCNC: 25 MMOL/L — SIGNIFICANT CHANGE UP (ref 22–26)
CO2 BLDV-SCNC: 27 MMOL/L — HIGH (ref 22–26)
CO2 SERPL-SCNC: 19 MMOL/L — LOW (ref 22–31)
CO2 SERPL-SCNC: 22 MMOL/L — SIGNIFICANT CHANGE UP (ref 22–31)
CO2 SERPL-SCNC: 22 MMOL/L — SIGNIFICANT CHANGE UP (ref 22–31)
COLOR SPEC: SIGNIFICANT CHANGE UP
CREAT SERPL-MCNC: 2.23 MG/DL — HIGH (ref 0.5–1.3)
CREAT SERPL-MCNC: 2.23 MG/DL — HIGH (ref 0.5–1.3)
CREAT SERPL-MCNC: 2.26 MG/DL — HIGH (ref 0.5–1.3)
DACRYOCYTES BLD QL SMEAR: SLIGHT — SIGNIFICANT CHANGE UP
DIFF PNL FLD: ABNORMAL
EGFR: 28 ML/MIN/1.73M2 — LOW
EGFR: 29 ML/MIN/1.73M2 — LOW
EGFR: 29 ML/MIN/1.73M2 — LOW
ELLIPTOCYTES BLD QL SMEAR: SLIGHT — SIGNIFICANT CHANGE UP
EOSINOPHIL # BLD AUTO: 0 K/UL — SIGNIFICANT CHANGE UP (ref 0–0.5)
EOSINOPHIL # BLD AUTO: 0.22 K/UL — SIGNIFICANT CHANGE UP (ref 0–0.5)
EOSINOPHIL NFR BLD AUTO: 0 % — SIGNIFICANT CHANGE UP (ref 0–6)
EOSINOPHIL NFR BLD AUTO: 3 % — SIGNIFICANT CHANGE UP (ref 0–6)
FLUAV AG NPH QL: SIGNIFICANT CHANGE UP
FLUBV AG NPH QL: SIGNIFICANT CHANGE UP
GAS PNL BLDV: 135 MMOL/L — LOW (ref 136–145)
GAS PNL BLDV: 135 MMOL/L — LOW (ref 136–145)
GAS PNL BLDV: SIGNIFICANT CHANGE UP
GAS PNL BLDV: SIGNIFICANT CHANGE UP
GIANT PLATELETS BLD QL SMEAR: PRESENT — SIGNIFICANT CHANGE UP
GLUCOSE BLDV-MCNC: 120 MG/DL — HIGH (ref 70–99)
GLUCOSE BLDV-MCNC: 158 MG/DL — HIGH (ref 70–99)
GLUCOSE SERPL-MCNC: 128 MG/DL — HIGH (ref 70–99)
GLUCOSE SERPL-MCNC: 166 MG/DL — HIGH (ref 70–99)
GLUCOSE SERPL-MCNC: 166 MG/DL — HIGH (ref 70–99)
GLUCOSE UR QL: NEGATIVE MG/DL — SIGNIFICANT CHANGE UP
HCO3 BLDV-SCNC: 24 MMOL/L — SIGNIFICANT CHANGE UP (ref 22–29)
HCO3 BLDV-SCNC: 26 MMOL/L — SIGNIFICANT CHANGE UP (ref 22–29)
HCT VFR BLD CALC: 34.6 % — LOW (ref 39–50)
HCT VFR BLD CALC: 37.2 % — LOW (ref 39–50)
HCT VFR BLDA CALC: 32 % — LOW (ref 39–51)
HCT VFR BLDA CALC: 38 % — LOW (ref 39–51)
HGB BLD CALC-MCNC: 10.5 G/DL — LOW (ref 12.6–17.4)
HGB BLD CALC-MCNC: 12.6 G/DL — SIGNIFICANT CHANGE UP (ref 12.6–17.4)
HGB BLD-MCNC: 11.6 G/DL — LOW (ref 13–17)
HGB BLD-MCNC: 11.9 G/DL — LOW (ref 13–17)
INR BLD: 1.21 RATIO — HIGH (ref 0.85–1.18)
KETONES UR-MCNC: NEGATIVE MG/DL — SIGNIFICANT CHANGE UP
LACTATE BLDV-MCNC: 1.5 MMOL/L — SIGNIFICANT CHANGE UP (ref 0.5–2)
LACTATE BLDV-MCNC: 3 MMOL/L — HIGH (ref 0.5–2)
LDH SERPL L TO P-CCNC: 481 U/L — HIGH (ref 50–242)
LEUKOCYTE ESTERASE UR-ACNC: ABNORMAL
LG PLATELETS BLD QL AUTO: SLIGHT — SIGNIFICANT CHANGE UP
LYMPHOCYTES # BLD AUTO: 0.25 K/UL — LOW (ref 1–3.3)
LYMPHOCYTES # BLD AUTO: 1.61 K/UL — SIGNIFICANT CHANGE UP (ref 1–3.3)
LYMPHOCYTES # BLD AUTO: 22 % — SIGNIFICANT CHANGE UP (ref 13–44)
LYMPHOCYTES # BLD AUTO: 4 % — LOW (ref 13–44)
MACROCYTES BLD QL: SIGNIFICANT CHANGE UP
MACROCYTES BLD QL: SIGNIFICANT CHANGE UP
MAGNESIUM SERPL-MCNC: 2 MG/DL — SIGNIFICANT CHANGE UP (ref 1.6–2.6)
MANUAL SMEAR VERIFICATION: SIGNIFICANT CHANGE UP
MCHC RBC-ENTMCNC: 32 GM/DL — SIGNIFICANT CHANGE UP (ref 32–36)
MCHC RBC-ENTMCNC: 33.5 G/DL — SIGNIFICANT CHANGE UP (ref 32–36)
MCHC RBC-ENTMCNC: 37.5 PG — HIGH (ref 27–34)
MCHC RBC-ENTMCNC: 38.4 PG — HIGH (ref 27–34)
MCV RBC AUTO: 114.6 FL — HIGH (ref 80–100)
MCV RBC AUTO: 117.4 FL — HIGH (ref 80–100)
METAMYELOCYTES # FLD: 1 % — HIGH (ref 0–0)
MONOCYTES # BLD AUTO: 0.25 K/UL — SIGNIFICANT CHANGE UP (ref 0–0.9)
MONOCYTES # BLD AUTO: 0.37 K/UL — SIGNIFICANT CHANGE UP (ref 0–0.9)
MONOCYTES NFR BLD AUTO: 4 % — SIGNIFICANT CHANGE UP (ref 2–14)
MONOCYTES NFR BLD AUTO: 5 % — SIGNIFICANT CHANGE UP (ref 2–14)
MYELOCYTES NFR BLD: 4 % — HIGH (ref 0–0)
NEUTROPHILS # BLD AUTO: 4.75 K/UL — SIGNIFICANT CHANGE UP (ref 1.8–7.4)
NEUTROPHILS # BLD AUTO: 5.81 K/UL — SIGNIFICANT CHANGE UP (ref 1.8–7.4)
NEUTROPHILS NFR BLD AUTO: 65 % — SIGNIFICANT CHANGE UP (ref 43–77)
NEUTROPHILS NFR BLD AUTO: 85 % — HIGH (ref 43–77)
NEUTS BAND # BLD: 7 % — SIGNIFICANT CHANGE UP (ref 0–8)
NITRITE UR-MCNC: NEGATIVE — SIGNIFICANT CHANGE UP
NRBC # BLD: 0 /100 WBCS — SIGNIFICANT CHANGE UP (ref 0–0)
NRBC # BLD: 0 /100 WBCS — SIGNIFICANT CHANGE UP (ref 0–0)
NRBC # BLD: SIGNIFICANT CHANGE UP /100 WBCS (ref 0–0)
OVALOCYTES BLD QL SMEAR: SLIGHT — SIGNIFICANT CHANGE UP
PCO2 BLDV: 36 MMHG — LOW (ref 42–55)
PCO2 BLDV: 47 MMHG — SIGNIFICANT CHANGE UP (ref 42–55)
PH BLDV: 7.35 — SIGNIFICANT CHANGE UP (ref 7.32–7.43)
PH BLDV: 7.43 — SIGNIFICANT CHANGE UP (ref 7.32–7.43)
PH UR: 6 — SIGNIFICANT CHANGE UP (ref 5–8)
PHOSPHATE SERPL-MCNC: 3.8 MG/DL — SIGNIFICANT CHANGE UP (ref 2.5–4.5)
PLAT MORPH BLD: ABNORMAL
PLAT MORPH BLD: NORMAL — SIGNIFICANT CHANGE UP
PLATELET # BLD AUTO: 209 K/UL — SIGNIFICANT CHANGE UP (ref 150–400)
PLATELET # BLD AUTO: 226 K/UL — SIGNIFICANT CHANGE UP (ref 150–400)
PLATELET CLUMP BLD QL SMEAR: ABNORMAL
PO2 BLDV: 30 MMHG — SIGNIFICANT CHANGE UP (ref 25–45)
PO2 BLDV: 61 MMHG — HIGH (ref 25–45)
POIKILOCYTOSIS BLD QL AUTO: SLIGHT — SIGNIFICANT CHANGE UP
POIKILOCYTOSIS BLD QL AUTO: SLIGHT — SIGNIFICANT CHANGE UP
POLYCHROMASIA BLD QL SMEAR: SLIGHT — SIGNIFICANT CHANGE UP
POTASSIUM BLDV-SCNC: 3.5 MMOL/L — SIGNIFICANT CHANGE UP (ref 3.5–5.1)
POTASSIUM BLDV-SCNC: 4.5 MMOL/L — SIGNIFICANT CHANGE UP (ref 3.5–5.1)
POTASSIUM SERPL-MCNC: 3.5 MMOL/L — SIGNIFICANT CHANGE UP (ref 3.5–5.3)
POTASSIUM SERPL-MCNC: 4 MMOL/L — SIGNIFICANT CHANGE UP (ref 3.5–5.3)
POTASSIUM SERPL-MCNC: 4.6 MMOL/L — SIGNIFICANT CHANGE UP (ref 3.5–5.3)
POTASSIUM SERPL-SCNC: 3.5 MMOL/L — SIGNIFICANT CHANGE UP (ref 3.5–5.3)
POTASSIUM SERPL-SCNC: 4 MMOL/L — SIGNIFICANT CHANGE UP (ref 3.5–5.3)
POTASSIUM SERPL-SCNC: 4.6 MMOL/L — SIGNIFICANT CHANGE UP (ref 3.5–5.3)
PROCALCITONIN SERPL-MCNC: 1.71 NG/ML — HIGH (ref 0.02–0.1)
PROT SERPL-MCNC: 7.2 G/DL — SIGNIFICANT CHANGE UP (ref 6–8.3)
PROT SERPL-MCNC: 7.3 G/DL — SIGNIFICANT CHANGE UP (ref 6–8.3)
PROT UR-MCNC: 100 MG/DL
PROTHROM AB SERPL-ACNC: 12.6 SEC — SIGNIFICANT CHANGE UP (ref 9.5–13)
RBC # BLD: 3.02 M/UL — LOW (ref 4.2–5.8)
RBC # BLD: 3.17 M/UL — LOW (ref 4.2–5.8)
RBC # BLD: 3.17 M/UL — LOW (ref 4.2–5.8)
RBC # FLD: 19.1 % — HIGH (ref 10.3–14.5)
RBC # FLD: 19.2 % — HIGH (ref 10.3–14.5)
RBC BLD AUTO: ABNORMAL
RBC BLD AUTO: ABNORMAL
RBC CASTS # UR COMP ASSIST: 2 /HPF — SIGNIFICANT CHANGE UP (ref 0–4)
RETICS #: 148.4 K/UL — HIGH (ref 25–125)
RETICS/RBC NFR: 4.7 % — HIGH (ref 0.5–2.5)
RH IG SCN BLD-IMP: NEGATIVE — SIGNIFICANT CHANGE UP
RSV RNA NPH QL NAA+NON-PROBE: SIGNIFICANT CHANGE UP
SAO2 % BLDV: 50.2 % — LOW (ref 67–88)
SAO2 % BLDV: 92 % — HIGH (ref 67–88)
SARS-COV-2 RNA SPEC QL NAA+PROBE: SIGNIFICANT CHANGE UP
SODIUM SERPL-SCNC: 138 MMOL/L — SIGNIFICANT CHANGE UP (ref 135–145)
SODIUM SERPL-SCNC: 139 MMOL/L — SIGNIFICANT CHANGE UP (ref 135–145)
SODIUM SERPL-SCNC: 142 MMOL/L — SIGNIFICANT CHANGE UP (ref 135–145)
SP GR SPEC: 1.01 — SIGNIFICANT CHANGE UP (ref 1–1.03)
SQUAMOUS # UR AUTO: 3 /HPF — SIGNIFICANT CHANGE UP (ref 0–5)
UROBILINOGEN FLD QL: 2 MG/DL (ref 0.2–1)
WBC # BLD: 6.32 K/UL — SIGNIFICANT CHANGE UP (ref 3.8–10.5)
WBC # BLD: 7.3 K/UL — SIGNIFICANT CHANGE UP (ref 3.8–10.5)
WBC # FLD AUTO: 6.32 K/UL — SIGNIFICANT CHANGE UP (ref 3.8–10.5)
WBC # FLD AUTO: 7.3 K/UL — SIGNIFICANT CHANGE UP (ref 3.8–10.5)
WBC UR QL: 39 /HPF — HIGH (ref 0–5)

## 2024-06-10 PROCEDURE — 76705 ECHO EXAM OF ABDOMEN: CPT | Mod: 26

## 2024-06-10 PROCEDURE — 93010 ELECTROCARDIOGRAM REPORT: CPT

## 2024-06-10 PROCEDURE — 71046 X-RAY EXAM CHEST 2 VIEWS: CPT | Mod: 26

## 2024-06-10 PROCEDURE — 99291 CRITICAL CARE FIRST HOUR: CPT

## 2024-06-10 PROCEDURE — 99292 CRITICAL CARE ADDL 30 MIN: CPT

## 2024-06-10 RX ORDER — DEXTROSE MONOHYDRATE AND SODIUM CHLORIDE 5; .3 G/100ML; G/100ML
1000 INJECTION, SOLUTION INTRAVENOUS ONCE
Refills: 0 | Status: COMPLETED | OUTPATIENT
Start: 2024-06-10 | End: 2024-06-10

## 2024-06-10 RX ORDER — PIPERACILLIN SODIUM AND TAZOBACTAM SODIUM 3; .375 G/15ML; G/15ML
3.38 INJECTION, POWDER, LYOPHILIZED, FOR SOLUTION INTRAVENOUS ONCE
Refills: 0 | Status: COMPLETED | OUTPATIENT
Start: 2024-06-10 | End: 2024-06-10

## 2024-06-10 RX ORDER — VANCOMYCIN HYDROCHLORIDE 50 MG/ML
1000 KIT ORAL ONCE
Refills: 0 | Status: COMPLETED | OUTPATIENT
Start: 2024-06-10 | End: 2024-06-10

## 2024-06-10 RX ORDER — ACETAMINOPHEN 325 MG
1000 TABLET ORAL ONCE
Refills: 0 | Status: COMPLETED | OUTPATIENT
Start: 2024-06-10 | End: 2024-06-10

## 2024-06-10 RX ADMIN — Medication 400 MILLIGRAM(S): at 18:39

## 2024-06-10 RX ADMIN — DEXTROSE MONOHYDRATE AND SODIUM CHLORIDE 1000 MILLILITER(S): 5; .3 INJECTION, SOLUTION INTRAVENOUS at 21:44

## 2024-06-10 RX ADMIN — VANCOMYCIN HYDROCHLORIDE 250 MILLIGRAM(S): KIT at 20:17

## 2024-06-10 RX ADMIN — PIPERACILLIN SODIUM AND TAZOBACTAM SODIUM 200 GRAM(S): 3; .375 INJECTION, POWDER, LYOPHILIZED, FOR SOLUTION INTRAVENOUS at 19:28

## 2024-06-10 RX ADMIN — DEXTROSE MONOHYDRATE AND SODIUM CHLORIDE 2000 MILLILITER(S): 5; .3 INJECTION, SOLUTION INTRAVENOUS at 18:39

## 2024-06-11 ENCOUNTER — RESULT REVIEW (OUTPATIENT)
Age: 84
End: 2024-06-11

## 2024-06-11 ENCOUNTER — APPOINTMENT (OUTPATIENT)
Dept: INFUSION THERAPY | Facility: HOSPITAL | Age: 84
End: 2024-06-11

## 2024-06-11 DIAGNOSIS — I73.9 PERIPHERAL VASCULAR DISEASE, UNSPECIFIED: ICD-10-CM

## 2024-06-11 DIAGNOSIS — I47.19 OTHER SUPRAVENTRICULAR TACHYCARDIA: ICD-10-CM

## 2024-06-11 DIAGNOSIS — M10.9 GOUT, UNSPECIFIED: ICD-10-CM

## 2024-06-11 DIAGNOSIS — E80.6 OTHER DISORDERS OF BILIRUBIN METABOLISM: ICD-10-CM

## 2024-06-11 DIAGNOSIS — R11.2 NAUSEA WITH VOMITING, UNSPECIFIED: ICD-10-CM

## 2024-06-11 DIAGNOSIS — R53.81 OTHER MALAISE: ICD-10-CM

## 2024-06-11 DIAGNOSIS — Z29.9 ENCOUNTER FOR PROPHYLACTIC MEASURES, UNSPECIFIED: ICD-10-CM

## 2024-06-11 DIAGNOSIS — D46.9 MYELODYSPLASTIC SYNDROME, UNSPECIFIED: ICD-10-CM

## 2024-06-11 DIAGNOSIS — R94.31 ABNORMAL ELECTROCARDIOGRAM [ECG] [EKG]: ICD-10-CM

## 2024-06-11 DIAGNOSIS — Z51.11 ENCOUNTER FOR ANTINEOPLASTIC CHEMOTHERAPY: ICD-10-CM

## 2024-06-11 DIAGNOSIS — I10 ESSENTIAL (PRIMARY) HYPERTENSION: ICD-10-CM

## 2024-06-11 DIAGNOSIS — R78.81 BACTEREMIA: ICD-10-CM

## 2024-06-11 DIAGNOSIS — I70.1 ATHEROSCLEROSIS OF RENAL ARTERY: ICD-10-CM

## 2024-06-11 LAB
-  STAPHYLOCOCCUS EPIDERMIDIS, METHICILLIN RESISTANT: SIGNIFICANT CHANGE UP
-  STENOTROPHOMONAS MALTOPHILIA: SIGNIFICANT CHANGE UP
ADD ON TEST-SPECIMEN IN LAB: SIGNIFICANT CHANGE UP
BILIRUB DIRECT SERPL-MCNC: 3.2 MG/DL — HIGH (ref 0–0.3)
BILIRUB INDIRECT FLD-MCNC: 0.8 MG/DL — SIGNIFICANT CHANGE UP (ref 0.2–1)
BILIRUB SERPL-MCNC: 4 MG/DL — HIGH (ref 0.2–1.2)
GLUCOSE BLDC GLUCOMTR-MCNC: 168 MG/DL — HIGH (ref 70–99)
GRAM STN FLD: ABNORMAL
METHOD TYPE: SIGNIFICANT CHANGE UP
MRSA PCR RESULT.: SIGNIFICANT CHANGE UP
S AUREUS DNA NOSE QL NAA+PROBE: SIGNIFICANT CHANGE UP
SPECIMEN SOURCE: SIGNIFICANT CHANGE UP
SPECIMEN SOURCE: SIGNIFICANT CHANGE UP
T3 SERPL-MCNC: 67 NG/DL — LOW (ref 80–200)
T4 AB SER-ACNC: 7.8 UG/DL — SIGNIFICANT CHANGE UP (ref 4.6–12)
TSH SERPL-MCNC: 2.69 UIU/ML — SIGNIFICANT CHANGE UP (ref 0.27–4.2)

## 2024-06-11 PROCEDURE — 99222 1ST HOSP IP/OBS MODERATE 55: CPT

## 2024-06-11 PROCEDURE — 78226 HEPATOBILIARY SYSTEM IMAGING: CPT | Mod: 26

## 2024-06-11 PROCEDURE — 74183 MRI ABD W/O CNTR FLWD CNTR: CPT | Mod: 26

## 2024-06-11 PROCEDURE — 93306 TTE W/DOPPLER COMPLETE: CPT | Mod: 26

## 2024-06-11 PROCEDURE — 99223 1ST HOSP IP/OBS HIGH 75: CPT | Mod: GC

## 2024-06-11 RX ORDER — VANCOMYCIN HYDROCHLORIDE 50 MG/ML
KIT ORAL
Refills: 0 | Status: DISCONTINUED | OUTPATIENT
Start: 2024-06-11 | End: 2024-06-16

## 2024-06-11 RX ORDER — GABAPENTIN
300 POWDER (GRAM) MISCELLANEOUS THREE TIMES A DAY
Refills: 0 | Status: DISCONTINUED | OUTPATIENT
Start: 2024-06-11 | End: 2024-06-11

## 2024-06-11 RX ORDER — ACYCLOVIR SODIUM 50 MG/ML
400 VIAL (ML) INTRAVENOUS
Refills: 0 | Status: DISCONTINUED | OUTPATIENT
Start: 2024-06-11 | End: 2024-06-19

## 2024-06-11 RX ORDER — CLOPIDOGREL BISULFATE 75 MG/1
75 TABLET, FILM COATED ORAL DAILY
Refills: 0 | Status: DISCONTINUED | OUTPATIENT
Start: 2024-06-11 | End: 2024-06-19

## 2024-06-11 RX ORDER — PIPERACILLIN SODIUM AND TAZOBACTAM SODIUM 3; .375 G/15ML; G/15ML
3.38 INJECTION, POWDER, LYOPHILIZED, FOR SOLUTION INTRAVENOUS ONCE
Refills: 0 | Status: COMPLETED | OUTPATIENT
Start: 2024-06-11 | End: 2024-06-11

## 2024-06-11 RX ORDER — PIPERACILLIN SODIUM AND TAZOBACTAM SODIUM 3; .375 G/15ML; G/15ML
3.38 INJECTION, POWDER, LYOPHILIZED, FOR SOLUTION INTRAVENOUS EVERY 8 HOURS
Refills: 0 | Status: DISCONTINUED | OUTPATIENT
Start: 2024-06-11 | End: 2024-06-11

## 2024-06-11 RX ORDER — DEXTROSE MONOHYDRATE AND SODIUM CHLORIDE 5; .3 G/100ML; G/100ML
1000 INJECTION, SOLUTION INTRAVENOUS
Refills: 0 | Status: DISCONTINUED | OUTPATIENT
Start: 2024-06-11 | End: 2024-06-13

## 2024-06-11 RX ORDER — SULFAMETHOXAZOLE AND TRIMETHOPRIM 800; 160 MG/1; MG/1
200 TABLET ORAL EVERY 12 HOURS
Refills: 0 | Status: DISCONTINUED | OUTPATIENT
Start: 2024-06-11 | End: 2024-06-18

## 2024-06-11 RX ORDER — ACETAMINOPHEN 325 MG
650 TABLET ORAL EVERY 6 HOURS
Refills: 0 | Status: DISCONTINUED | OUTPATIENT
Start: 2024-06-11 | End: 2024-06-19

## 2024-06-11 RX ORDER — MINOCYCLINE HCL 50 MG
200 CAPSULE ORAL EVERY 12 HOURS
Refills: 0 | Status: DISCONTINUED | OUTPATIENT
Start: 2024-06-11 | End: 2024-06-13

## 2024-06-11 RX ORDER — VANCOMYCIN HYDROCHLORIDE 50 MG/ML
KIT ORAL
Refills: 0 | Status: DISCONTINUED | OUTPATIENT
Start: 2024-06-11 | End: 2024-06-11

## 2024-06-11 RX ORDER — METOPROLOL TARTRATE 50 MG
150 TABLET ORAL DAILY
Refills: 0 | Status: DISCONTINUED | OUTPATIENT
Start: 2024-06-11 | End: 2024-06-19

## 2024-06-11 RX ORDER — ASPIRIN 325 MG/1
81 TABLET, FILM COATED ORAL DAILY
Refills: 0 | Status: DISCONTINUED | OUTPATIENT
Start: 2024-06-11 | End: 2024-06-19

## 2024-06-11 RX ORDER — PIPERACILLIN SODIUM AND TAZOBACTAM SODIUM 3; .375 G/15ML; G/15ML
3.38 INJECTION, POWDER, LYOPHILIZED, FOR SOLUTION INTRAVENOUS EVERY 12 HOURS
Refills: 0 | Status: DISCONTINUED | OUTPATIENT
Start: 2024-06-11 | End: 2024-06-11

## 2024-06-11 RX ORDER — AMLODIPINE BESYLATE 2.5 MG/1
5 TABLET ORAL DAILY
Refills: 0 | Status: DISCONTINUED | OUTPATIENT
Start: 2024-06-11 | End: 2024-06-19

## 2024-06-11 RX ORDER — VANCOMYCIN HYDROCHLORIDE 50 MG/ML
1000 KIT ORAL EVERY 24 HOURS
Refills: 0 | Status: DISCONTINUED | OUTPATIENT
Start: 2024-06-12 | End: 2024-06-16

## 2024-06-11 RX ORDER — ALLOPURINOL 300 MG/1
100 TABLET ORAL DAILY
Refills: 0 | Status: DISCONTINUED | OUTPATIENT
Start: 2024-06-11 | End: 2024-06-19

## 2024-06-11 RX ORDER — MAGNESIUM, ALUMINUM HYDROXIDE 400-400
30 TABLET,CHEWABLE ORAL EVERY 4 HOURS
Refills: 0 | Status: DISCONTINUED | OUTPATIENT
Start: 2024-06-11 | End: 2024-06-17

## 2024-06-11 RX ORDER — FUROSEMIDE 10 MG/ML
40 INJECTION, SOLUTION INTRAMUSCULAR; INTRAVENOUS DAILY
Refills: 0 | Status: DISCONTINUED | OUTPATIENT
Start: 2024-06-11 | End: 2024-06-14

## 2024-06-11 RX ORDER — GABAPENTIN
300 POWDER (GRAM) MISCELLANEOUS
Refills: 0 | Status: DISCONTINUED | OUTPATIENT
Start: 2024-06-11 | End: 2024-06-19

## 2024-06-11 RX ORDER — SERTRALINE HYDROCHLORIDE 100 MG/1
25 TABLET, FILM COATED ORAL DAILY
Refills: 0 | Status: DISCONTINUED | OUTPATIENT
Start: 2024-06-11 | End: 2024-06-19

## 2024-06-11 RX ORDER — VANCOMYCIN HYDROCHLORIDE 50 MG/ML
1000 KIT ORAL ONCE
Refills: 0 | Status: COMPLETED | OUTPATIENT
Start: 2024-06-11 | End: 2024-06-11

## 2024-06-11 RX ORDER — ATORVASTATIN CALCIUM 20 MG/1
40 TABLET, FILM COATED ORAL AT BEDTIME
Refills: 0 | Status: DISCONTINUED | OUTPATIENT
Start: 2024-06-11 | End: 2024-06-11

## 2024-06-11 RX ADMIN — Medication 100 MILLIGRAM(S): at 22:58

## 2024-06-11 RX ADMIN — Medication 400 MILLIGRAM(S): at 16:14

## 2024-06-11 RX ADMIN — PIPERACILLIN SODIUM AND TAZOBACTAM SODIUM 25 GRAM(S): 3; .375 INJECTION, POWDER, LYOPHILIZED, FOR SOLUTION INTRAVENOUS at 09:36

## 2024-06-11 RX ADMIN — CLOPIDOGREL BISULFATE 75 MILLIGRAM(S): 75 TABLET, FILM COATED ORAL at 16:14

## 2024-06-11 RX ADMIN — ALLOPURINOL 100 MILLIGRAM(S): 300 TABLET ORAL at 16:14

## 2024-06-11 RX ADMIN — SULFAMETHOXAZOLE AND TRIMETHOPRIM 512.5 MILLIGRAM(S): 800; 160 TABLET ORAL at 22:58

## 2024-06-11 RX ADMIN — Medication 2000 UNIT(S): at 16:14

## 2024-06-11 RX ADMIN — VANCOMYCIN HYDROCHLORIDE 250 MILLIGRAM(S): KIT at 17:32

## 2024-06-11 RX ADMIN — SERTRALINE HYDROCHLORIDE 25 MILLIGRAM(S): 100 TABLET, FILM COATED ORAL at 16:14

## 2024-06-11 RX ADMIN — ASPIRIN 81 MILLIGRAM(S): 325 TABLET, FILM COATED ORAL at 16:14

## 2024-06-11 RX ADMIN — PIPERACILLIN SODIUM AND TAZOBACTAM SODIUM 200 GRAM(S): 3; .375 INJECTION, POWDER, LYOPHILIZED, FOR SOLUTION INTRAVENOUS at 06:34

## 2024-06-11 RX ADMIN — PIPERACILLIN SODIUM AND TAZOBACTAM SODIUM 25 GRAM(S): 3; .375 INJECTION, POWDER, LYOPHILIZED, FOR SOLUTION INTRAVENOUS at 16:15

## 2024-06-11 RX ADMIN — DEXTROSE MONOHYDRATE AND SODIUM CHLORIDE 65 MILLILITER(S): 5; .3 INJECTION, SOLUTION INTRAVENOUS at 12:30

## 2024-06-11 RX ADMIN — Medication 300 MILLIGRAM(S): at 16:13

## 2024-06-11 NOTE — HISTORY OF PRESENT ILLNESS
[FreeTextEntry1] : Patient with complex past medical history as listed above presented for left lower extremity peripheral arterial disease examination.  He has a left lower extremity swelling and also nonhealing toe ulcers.  Past medical history as listed.   The swelling and the toe ulcer improved and currently does not have any active wounds.  He lives at home and able to use walker.  He has caregiver for his medical needs.

## 2024-06-11 NOTE — PHYSICAL EXAM
[JVD] : no jugular venous distention  [Normal Breath Sounds] : Normal breath sounds [Normal Heart Sounds] : normal heart sounds [FreeTextEntry1] : Palpable bilateral femoral pulses.  Pedal pulses diminished.  Toe wound on the right side healing.

## 2024-06-11 NOTE — ASSESSMENT
[FreeTextEntry1] : 83-year-old with complex past medical history with left lower extremity swelling and toe ulcers.   Given patient complex past medical history I recommended to continue medical management with leg elevation, antiplatelet therapy.  Will do close follow-up.  Patient to follow-up in 3 months.

## 2024-06-12 ENCOUNTER — APPOINTMENT (OUTPATIENT)
Dept: INFUSION THERAPY | Facility: HOSPITAL | Age: 84
End: 2024-06-12

## 2024-06-12 ENCOUNTER — TRANSCRIPTION ENCOUNTER (OUTPATIENT)
Age: 84
End: 2024-06-12

## 2024-06-12 DIAGNOSIS — I50.21 ACUTE SYSTOLIC (CONGESTIVE) HEART FAILURE: ICD-10-CM

## 2024-06-12 LAB
A1C WITH ESTIMATED AVERAGE GLUCOSE RESULT: 5.3 % — SIGNIFICANT CHANGE UP (ref 4–5.6)
ALBUMIN SERPL ELPH-MCNC: 3.3 G/DL — SIGNIFICANT CHANGE UP (ref 3.3–5)
ALP SERPL-CCNC: 239 U/L — HIGH (ref 40–120)
ALT FLD-CCNC: 99 U/L — HIGH (ref 10–45)
ANION GAP SERPL CALC-SCNC: 16 MMOL/L — SIGNIFICANT CHANGE UP (ref 5–17)
AST SERPL-CCNC: 36 U/L — SIGNIFICANT CHANGE UP (ref 10–40)
BASOPHILS # BLD AUTO: 0.12 K/UL — SIGNIFICANT CHANGE UP (ref 0–0.2)
BASOPHILS NFR BLD AUTO: 2.1 % — HIGH (ref 0–2)
BILIRUB SERPL-MCNC: 1.1 MG/DL — SIGNIFICANT CHANGE UP (ref 0.2–1.2)
BUN SERPL-MCNC: 23 MG/DL — SIGNIFICANT CHANGE UP (ref 7–23)
CALCIUM SERPL-MCNC: 8.7 MG/DL — SIGNIFICANT CHANGE UP (ref 8.4–10.5)
CHLORIDE SERPL-SCNC: 102 MMOL/L — SIGNIFICANT CHANGE UP (ref 96–108)
CO2 SERPL-SCNC: 21 MMOL/L — LOW (ref 22–31)
CREAT SERPL-MCNC: 2.08 MG/DL — HIGH (ref 0.5–1.3)
EGFR: 31 ML/MIN/1.73M2 — LOW
EOSINOPHIL # BLD AUTO: 0.2 K/UL — SIGNIFICANT CHANGE UP (ref 0–0.5)
EOSINOPHIL NFR BLD AUTO: 3.4 % — SIGNIFICANT CHANGE UP (ref 0–6)
ESTIMATED AVERAGE GLUCOSE: 105 MG/DL — SIGNIFICANT CHANGE UP (ref 68–114)
FOLATE SERPL-MCNC: 16.6 NG/ML — SIGNIFICANT CHANGE UP
GLUCOSE SERPL-MCNC: 185 MG/DL — HIGH (ref 70–99)
HCT VFR BLD CALC: 30.6 % — LOW (ref 39–50)
HGB BLD-MCNC: 9.8 G/DL — LOW (ref 13–17)
IMM GRANULOCYTES NFR BLD AUTO: 5.9 % — HIGH (ref 0–0.9)
LYMPHOCYTES # BLD AUTO: 0.58 K/UL — LOW (ref 1–3.3)
LYMPHOCYTES # BLD AUTO: 10 % — LOW (ref 13–44)
MCHC RBC-ENTMCNC: 32 GM/DL — SIGNIFICANT CHANGE UP (ref 32–36)
MCHC RBC-ENTMCNC: 38 PG — HIGH (ref 27–34)
MCV RBC AUTO: 118.6 FL — HIGH (ref 80–100)
MONOCYTES # BLD AUTO: 0.35 K/UL — SIGNIFICANT CHANGE UP (ref 0–0.9)
MONOCYTES NFR BLD AUTO: 6 % — SIGNIFICANT CHANGE UP (ref 2–14)
NEUTROPHILS # BLD AUTO: 4.22 K/UL — SIGNIFICANT CHANGE UP (ref 1.8–7.4)
NEUTROPHILS NFR BLD AUTO: 72.6 % — SIGNIFICANT CHANGE UP (ref 43–77)
NRBC # BLD: 0 /100 WBCS — SIGNIFICANT CHANGE UP (ref 0–0)
PLATELET # BLD AUTO: 172 K/UL — SIGNIFICANT CHANGE UP (ref 150–400)
POTASSIUM SERPL-MCNC: 3.8 MMOL/L — SIGNIFICANT CHANGE UP (ref 3.5–5.3)
POTASSIUM SERPL-SCNC: 3.8 MMOL/L — SIGNIFICANT CHANGE UP (ref 3.5–5.3)
PROT SERPL-MCNC: 6.1 G/DL — SIGNIFICANT CHANGE UP (ref 6–8.3)
RBC # BLD: 2.58 M/UL — LOW (ref 4.2–5.8)
RBC # FLD: 18.9 % — HIGH (ref 10.3–14.5)
SODIUM SERPL-SCNC: 139 MMOL/L — SIGNIFICANT CHANGE UP (ref 135–145)
VIT B12 SERPL-MCNC: 499 PG/ML — SIGNIFICANT CHANGE UP (ref 232–1245)
WBC # BLD: 5.81 K/UL — SIGNIFICANT CHANGE UP (ref 3.8–10.5)
WBC # FLD AUTO: 5.81 K/UL — SIGNIFICANT CHANGE UP (ref 3.8–10.5)

## 2024-06-12 PROCEDURE — 99222 1ST HOSP IP/OBS MODERATE 55: CPT

## 2024-06-12 PROCEDURE — 99223 1ST HOSP IP/OBS HIGH 75: CPT

## 2024-06-12 PROCEDURE — 99232 SBSQ HOSP IP/OBS MODERATE 35: CPT | Mod: GC

## 2024-06-12 RX ORDER — SENNOSIDES 8.6 MG
2 TABLET ORAL AT BEDTIME
Refills: 0 | Status: DISCONTINUED | OUTPATIENT
Start: 2024-06-12 | End: 2024-06-19

## 2024-06-12 RX ORDER — CADEXOMER IODINE 0.9 %
1 GEL (GRAM) TOPICAL DAILY
Refills: 0 | Status: DISCONTINUED | OUTPATIENT
Start: 2024-06-12 | End: 2024-06-19

## 2024-06-12 RX ORDER — POLYETHYLENE GLYCOL 3350 1 G/G
17 POWDER ORAL DAILY
Refills: 0 | Status: DISCONTINUED | OUTPATIENT
Start: 2024-06-12 | End: 2024-06-18

## 2024-06-12 RX ADMIN — Medication 100 MILLIGRAM(S): at 21:25

## 2024-06-12 RX ADMIN — VANCOMYCIN HYDROCHLORIDE 250 MILLIGRAM(S): KIT at 18:05

## 2024-06-12 RX ADMIN — ALLOPURINOL 100 MILLIGRAM(S): 300 TABLET ORAL at 12:51

## 2024-06-12 RX ADMIN — SULFAMETHOXAZOLE AND TRIMETHOPRIM 512.5 MILLIGRAM(S): 800; 160 TABLET ORAL at 10:33

## 2024-06-12 RX ADMIN — AMLODIPINE BESYLATE 5 MILLIGRAM(S): 2.5 TABLET ORAL at 05:24

## 2024-06-12 RX ADMIN — DEXTROSE MONOHYDRATE AND SODIUM CHLORIDE 65 MILLILITER(S): 5; .3 INJECTION, SOLUTION INTRAVENOUS at 00:09

## 2024-06-12 RX ADMIN — Medication 100 MILLIGRAM(S): at 10:33

## 2024-06-12 RX ADMIN — FUROSEMIDE 40 MILLIGRAM(S): 10 INJECTION, SOLUTION INTRAMUSCULAR; INTRAVENOUS at 05:24

## 2024-06-12 RX ADMIN — Medication 2 TABLET(S): at 21:25

## 2024-06-12 RX ADMIN — POLYETHYLENE GLYCOL 3350 17 GRAM(S): 1 POWDER ORAL at 13:28

## 2024-06-12 RX ADMIN — Medication 2000 UNIT(S): at 12:51

## 2024-06-12 RX ADMIN — Medication 400 MILLIGRAM(S): at 21:25

## 2024-06-12 RX ADMIN — ASPIRIN 81 MILLIGRAM(S): 325 TABLET, FILM COATED ORAL at 12:51

## 2024-06-12 RX ADMIN — Medication 150 MILLIGRAM(S): at 05:24

## 2024-06-12 RX ADMIN — Medication 300 MILLIGRAM(S): at 18:05

## 2024-06-12 RX ADMIN — Medication 1 TABLET(S): at 13:28

## 2024-06-12 RX ADMIN — Medication 1 APPLICATION(S): at 13:27

## 2024-06-12 RX ADMIN — SERTRALINE HYDROCHLORIDE 25 MILLIGRAM(S): 100 TABLET, FILM COATED ORAL at 12:51

## 2024-06-12 RX ADMIN — SULFAMETHOXAZOLE AND TRIMETHOPRIM 512.5 MILLIGRAM(S): 800; 160 TABLET ORAL at 22:53

## 2024-06-12 RX ADMIN — CLOPIDOGREL BISULFATE 75 MILLIGRAM(S): 75 TABLET, FILM COATED ORAL at 12:52

## 2024-06-12 RX ADMIN — Medication 300 MILLIGRAM(S): at 05:24

## 2024-06-12 RX ADMIN — Medication 400 MILLIGRAM(S): at 05:24

## 2024-06-13 ENCOUNTER — APPOINTMENT (OUTPATIENT)
Dept: INFUSION THERAPY | Facility: HOSPITAL | Age: 84
End: 2024-06-13

## 2024-06-13 ENCOUNTER — LABORATORY RESULT (OUTPATIENT)
Age: 84
End: 2024-06-13

## 2024-06-13 ENCOUNTER — APPOINTMENT (OUTPATIENT)
Dept: HEMATOLOGY ONCOLOGY | Facility: CLINIC | Age: 84
End: 2024-06-13

## 2024-06-13 LAB
-  AMPICILLIN: SIGNIFICANT CHANGE UP
-  CIPROFLOXACIN: SIGNIFICANT CHANGE UP
-  CLINDAMYCIN: SIGNIFICANT CHANGE UP
-  ERYTHROMYCIN: SIGNIFICANT CHANGE UP
-  GENTAMICIN: SIGNIFICANT CHANGE UP
-  LEVOFLOXACIN: SIGNIFICANT CHANGE UP
-  LEVOFLOXACIN: SIGNIFICANT CHANGE UP
-  MINOCYCLINE: SIGNIFICANT CHANGE UP
-  NITROFURANTOIN: SIGNIFICANT CHANGE UP
-  OXACILLIN: SIGNIFICANT CHANGE UP
-  PENICILLIN: SIGNIFICANT CHANGE UP
-  RIFAMPIN: SIGNIFICANT CHANGE UP
-  TETRACYCLINE: SIGNIFICANT CHANGE UP
-  TETRACYCLINE: SIGNIFICANT CHANGE UP
-  TRIMETHOPRIM/SULFAMETHOXAZOLE: SIGNIFICANT CHANGE UP
-  TRIMETHOPRIM/SULFAMETHOXAZOLE: SIGNIFICANT CHANGE UP
-  VANCOMYCIN: SIGNIFICANT CHANGE UP
-  VANCOMYCIN: SIGNIFICANT CHANGE UP
ALBUMIN SERPL ELPH-MCNC: 3.3 G/DL — SIGNIFICANT CHANGE UP (ref 3.3–5)
ALP SERPL-CCNC: 211 U/L — HIGH (ref 40–120)
ALT FLD-CCNC: 68 U/L — HIGH (ref 10–45)
ANION GAP SERPL CALC-SCNC: 12 MMOL/L — SIGNIFICANT CHANGE UP (ref 5–17)
AST SERPL-CCNC: 20 U/L — SIGNIFICANT CHANGE UP (ref 10–40)
BASOPHILS # BLD AUTO: 0.13 K/UL — SIGNIFICANT CHANGE UP (ref 0–0.2)
BASOPHILS NFR BLD AUTO: 1.9 % — SIGNIFICANT CHANGE UP (ref 0–2)
BILIRUB SERPL-MCNC: 0.8 MG/DL — SIGNIFICANT CHANGE UP (ref 0.2–1.2)
BUN SERPL-MCNC: 22 MG/DL — SIGNIFICANT CHANGE UP (ref 7–23)
CALCIUM SERPL-MCNC: 8.9 MG/DL — SIGNIFICANT CHANGE UP (ref 8.4–10.5)
CHLORIDE SERPL-SCNC: 102 MMOL/L — SIGNIFICANT CHANGE UP (ref 96–108)
CO2 SERPL-SCNC: 23 MMOL/L — SIGNIFICANT CHANGE UP (ref 22–31)
CREAT SERPL-MCNC: 2.22 MG/DL — HIGH (ref 0.5–1.3)
CULTURE RESULTS: ABNORMAL
EGFR: 29 ML/MIN/1.73M2 — LOW
EOSINOPHIL # BLD AUTO: 0.2 K/UL — SIGNIFICANT CHANGE UP (ref 0–0.5)
EOSINOPHIL NFR BLD AUTO: 2.9 % — SIGNIFICANT CHANGE UP (ref 0–6)
GLUCOSE BLDC GLUCOMTR-MCNC: 107 MG/DL — HIGH (ref 70–99)
GLUCOSE SERPL-MCNC: 98 MG/DL — SIGNIFICANT CHANGE UP (ref 70–99)
HCT VFR BLD CALC: 30.8 % — LOW (ref 39–50)
HGB BLD-MCNC: 9.9 G/DL — LOW (ref 13–17)
IMM GRANULOCYTES NFR BLD AUTO: 5.3 % — HIGH (ref 0–0.9)
LYMPHOCYTES # BLD AUTO: 0.98 K/UL — LOW (ref 1–3.3)
LYMPHOCYTES # BLD AUTO: 14.5 % — SIGNIFICANT CHANGE UP (ref 13–44)
MCHC RBC-ENTMCNC: 32.1 GM/DL — SIGNIFICANT CHANGE UP (ref 32–36)
MCHC RBC-ENTMCNC: 37.6 PG — HIGH (ref 27–34)
MCV RBC AUTO: 117.1 FL — HIGH (ref 80–100)
METHOD TYPE: SIGNIFICANT CHANGE UP
MONOCYTES # BLD AUTO: 0.45 K/UL — SIGNIFICANT CHANGE UP (ref 0–0.9)
MONOCYTES NFR BLD AUTO: 6.6 % — SIGNIFICANT CHANGE UP (ref 2–14)
NEUTROPHILS # BLD AUTO: 4.66 K/UL — SIGNIFICANT CHANGE UP (ref 1.8–7.4)
NEUTROPHILS NFR BLD AUTO: 68.8 % — SIGNIFICANT CHANGE UP (ref 43–77)
NRBC # BLD: 0 /100 WBCS — SIGNIFICANT CHANGE UP (ref 0–0)
ORGANISM # SPEC MICROSCOPIC CNT: ABNORMAL
PLATELET # BLD AUTO: 193 K/UL — SIGNIFICANT CHANGE UP (ref 150–400)
POTASSIUM SERPL-MCNC: 3.9 MMOL/L — SIGNIFICANT CHANGE UP (ref 3.5–5.3)
POTASSIUM SERPL-SCNC: 3.9 MMOL/L — SIGNIFICANT CHANGE UP (ref 3.5–5.3)
PROT SERPL-MCNC: 6.3 G/DL — SIGNIFICANT CHANGE UP (ref 6–8.3)
RBC # BLD: 2.63 M/UL — LOW (ref 4.2–5.8)
RBC # FLD: 18.8 % — HIGH (ref 10.3–14.5)
SODIUM SERPL-SCNC: 137 MMOL/L — SIGNIFICANT CHANGE UP (ref 135–145)
SPECIMEN SOURCE: SIGNIFICANT CHANGE UP
VANCOMYCIN TROUGH SERPL-MCNC: 10.3 UG/ML — SIGNIFICANT CHANGE UP (ref 10–20)
WBC # BLD: 6.78 K/UL — SIGNIFICANT CHANGE UP (ref 3.8–10.5)
WBC # FLD AUTO: 6.78 K/UL — SIGNIFICANT CHANGE UP (ref 3.8–10.5)

## 2024-06-13 PROCEDURE — 99233 SBSQ HOSP IP/OBS HIGH 50: CPT

## 2024-06-13 PROCEDURE — 99232 SBSQ HOSP IP/OBS MODERATE 35: CPT | Mod: GC

## 2024-06-13 PROCEDURE — 99232 SBSQ HOSP IP/OBS MODERATE 35: CPT

## 2024-06-13 RX ADMIN — ASPIRIN 81 MILLIGRAM(S): 325 TABLET, FILM COATED ORAL at 12:32

## 2024-06-13 RX ADMIN — AMLODIPINE BESYLATE 5 MILLIGRAM(S): 2.5 TABLET ORAL at 05:48

## 2024-06-13 RX ADMIN — Medication 1 APPLICATION(S): at 12:50

## 2024-06-13 RX ADMIN — ALLOPURINOL 100 MILLIGRAM(S): 300 TABLET ORAL at 12:32

## 2024-06-13 RX ADMIN — Medication 150 MILLIGRAM(S): at 05:48

## 2024-06-13 RX ADMIN — SULFAMETHOXAZOLE AND TRIMETHOPRIM 512.5 MILLIGRAM(S): 800; 160 TABLET ORAL at 10:47

## 2024-06-13 RX ADMIN — Medication 2000 UNIT(S): at 12:32

## 2024-06-13 RX ADMIN — Medication 300 MILLIGRAM(S): at 17:05

## 2024-06-13 RX ADMIN — Medication 1 TABLET(S): at 12:33

## 2024-06-13 RX ADMIN — VANCOMYCIN HYDROCHLORIDE 250 MILLIGRAM(S): KIT at 17:06

## 2024-06-13 RX ADMIN — SERTRALINE HYDROCHLORIDE 25 MILLIGRAM(S): 100 TABLET, FILM COATED ORAL at 12:33

## 2024-06-13 RX ADMIN — CLOPIDOGREL BISULFATE 75 MILLIGRAM(S): 75 TABLET, FILM COATED ORAL at 12:32

## 2024-06-13 RX ADMIN — Medication 100 MILLIGRAM(S): at 10:01

## 2024-06-13 RX ADMIN — Medication 300 MILLIGRAM(S): at 05:47

## 2024-06-13 RX ADMIN — Medication 400 MILLIGRAM(S): at 10:01

## 2024-06-13 RX ADMIN — Medication 400 MILLIGRAM(S): at 17:06

## 2024-06-13 RX ADMIN — POLYETHYLENE GLYCOL 3350 17 GRAM(S): 1 POWDER ORAL at 12:33

## 2024-06-13 RX ADMIN — FUROSEMIDE 40 MILLIGRAM(S): 10 INJECTION, SOLUTION INTRAMUSCULAR; INTRAVENOUS at 05:47

## 2024-06-13 RX ADMIN — SULFAMETHOXAZOLE AND TRIMETHOPRIM 512.5 MILLIGRAM(S): 800; 160 TABLET ORAL at 21:38

## 2024-06-14 ENCOUNTER — LABORATORY RESULT (OUTPATIENT)
Age: 84
End: 2024-06-14

## 2024-06-14 ENCOUNTER — APPOINTMENT (OUTPATIENT)
Dept: INFUSION THERAPY | Facility: HOSPITAL | Age: 84
End: 2024-06-14

## 2024-06-14 LAB
ALBUMIN SERPL ELPH-MCNC: 3.7 G/DL — SIGNIFICANT CHANGE UP (ref 3.3–5)
ALP SERPL-CCNC: 188 U/L — HIGH (ref 40–120)
ALT FLD-CCNC: 50 U/L — HIGH (ref 10–45)
ANION GAP SERPL CALC-SCNC: 16 MMOL/L — SIGNIFICANT CHANGE UP (ref 5–17)
AST SERPL-CCNC: 18 U/L — SIGNIFICANT CHANGE UP (ref 10–40)
BASOPHILS # BLD AUTO: 0.2 K/UL — SIGNIFICANT CHANGE UP (ref 0–0.2)
BASOPHILS NFR BLD AUTO: 3.5 % — HIGH (ref 0–2)
BILIRUB SERPL-MCNC: 0.8 MG/DL — SIGNIFICANT CHANGE UP (ref 0.2–1.2)
BUN SERPL-MCNC: 28 MG/DL — HIGH (ref 7–23)
CALCIUM SERPL-MCNC: 8.8 MG/DL — SIGNIFICANT CHANGE UP (ref 8.4–10.5)
CHLORIDE SERPL-SCNC: 98 MMOL/L — SIGNIFICANT CHANGE UP (ref 96–108)
CO2 SERPL-SCNC: 21 MMOL/L — LOW (ref 22–31)
COPPER SERPL-MCNC: 100 UG/DL — SIGNIFICANT CHANGE UP (ref 69–132)
CREAT SERPL-MCNC: 2.47 MG/DL — HIGH (ref 0.5–1.3)
EGFR: 25 ML/MIN/1.73M2 — LOW
EOSINOPHIL # BLD AUTO: 0.2 K/UL — SIGNIFICANT CHANGE UP (ref 0–0.5)
EOSINOPHIL NFR BLD AUTO: 3.5 % — SIGNIFICANT CHANGE UP (ref 0–6)
GLUCOSE SERPL-MCNC: 90 MG/DL — SIGNIFICANT CHANGE UP (ref 70–99)
HCT VFR BLD CALC: 32.2 % — LOW (ref 39–50)
HGB BLD-MCNC: 10.4 G/DL — LOW (ref 13–17)
LYMPHOCYTES # BLD AUTO: 0.97 K/UL — LOW (ref 1–3.3)
LYMPHOCYTES # BLD AUTO: 16.7 % — SIGNIFICANT CHANGE UP (ref 13–44)
MAGNESIUM SERPL-MCNC: 2.6 MG/DL — SIGNIFICANT CHANGE UP (ref 1.6–2.6)
MANUAL SMEAR VERIFICATION: SIGNIFICANT CHANGE UP
MCHC RBC-ENTMCNC: 32.3 GM/DL — SIGNIFICANT CHANGE UP (ref 32–36)
MCHC RBC-ENTMCNC: 37.4 PG — HIGH (ref 27–34)
MCV RBC AUTO: 115.8 FL — HIGH (ref 80–100)
MONOCYTES # BLD AUTO: 0.3 K/UL — SIGNIFICANT CHANGE UP (ref 0–0.9)
MONOCYTES NFR BLD AUTO: 5.2 % — SIGNIFICANT CHANGE UP (ref 2–14)
NEUTROPHILS # BLD AUTO: 4.15 K/UL — SIGNIFICANT CHANGE UP (ref 1.8–7.4)
NEUTROPHILS NFR BLD AUTO: 70.2 % — SIGNIFICANT CHANGE UP (ref 43–77)
NEUTS BAND # BLD: 0.9 % — SIGNIFICANT CHANGE UP (ref 0–8)
PHOSPHATE SERPL-MCNC: 5.1 MG/DL — HIGH (ref 2.5–4.5)
PLAT MORPH BLD: NORMAL — SIGNIFICANT CHANGE UP
PLATELET # BLD AUTO: 205 K/UL — SIGNIFICANT CHANGE UP (ref 150–400)
POTASSIUM SERPL-MCNC: 4 MMOL/L — SIGNIFICANT CHANGE UP (ref 3.5–5.3)
POTASSIUM SERPL-SCNC: 4 MMOL/L — SIGNIFICANT CHANGE UP (ref 3.5–5.3)
PROT SERPL-MCNC: 6.8 G/DL — SIGNIFICANT CHANGE UP (ref 6–8.3)
RBC # BLD: 2.78 M/UL — LOW (ref 4.2–5.8)
RBC # FLD: 18.5 % — HIGH (ref 10.3–14.5)
RBC BLD AUTO: SIGNIFICANT CHANGE UP
SODIUM SERPL-SCNC: 135 MMOL/L — SIGNIFICANT CHANGE UP (ref 135–145)
VANCOMYCIN TROUGH SERPL-MCNC: 16.6 UG/ML — SIGNIFICANT CHANGE UP (ref 10–20)
WBC # BLD: 5.83 K/UL — SIGNIFICANT CHANGE UP (ref 3.8–10.5)
WBC # FLD AUTO: 5.83 K/UL — SIGNIFICANT CHANGE UP (ref 3.8–10.5)

## 2024-06-14 PROCEDURE — 99232 SBSQ HOSP IP/OBS MODERATE 35: CPT | Mod: GC

## 2024-06-14 PROCEDURE — 99232 SBSQ HOSP IP/OBS MODERATE 35: CPT

## 2024-06-14 RX ADMIN — CLOPIDOGREL BISULFATE 75 MILLIGRAM(S): 75 TABLET, FILM COATED ORAL at 11:12

## 2024-06-14 RX ADMIN — Medication 400 MILLIGRAM(S): at 05:47

## 2024-06-14 RX ADMIN — SULFAMETHOXAZOLE AND TRIMETHOPRIM 512.5 MILLIGRAM(S): 800; 160 TABLET ORAL at 21:18

## 2024-06-14 RX ADMIN — Medication 150 MILLIGRAM(S): at 05:47

## 2024-06-14 RX ADMIN — ASPIRIN 81 MILLIGRAM(S): 325 TABLET, FILM COATED ORAL at 11:12

## 2024-06-14 RX ADMIN — Medication 300 MILLIGRAM(S): at 06:01

## 2024-06-14 RX ADMIN — AMLODIPINE BESYLATE 5 MILLIGRAM(S): 2.5 TABLET ORAL at 05:47

## 2024-06-14 RX ADMIN — Medication 400 MILLIGRAM(S): at 17:11

## 2024-06-14 RX ADMIN — Medication 1 APPLICATION(S): at 11:13

## 2024-06-14 RX ADMIN — FUROSEMIDE 40 MILLIGRAM(S): 10 INJECTION, SOLUTION INTRAMUSCULAR; INTRAVENOUS at 05:47

## 2024-06-14 RX ADMIN — SERTRALINE HYDROCHLORIDE 25 MILLIGRAM(S): 100 TABLET, FILM COATED ORAL at 11:11

## 2024-06-14 RX ADMIN — Medication 1 TABLET(S): at 11:11

## 2024-06-14 RX ADMIN — Medication 2000 UNIT(S): at 11:11

## 2024-06-14 RX ADMIN — SULFAMETHOXAZOLE AND TRIMETHOPRIM 512.5 MILLIGRAM(S): 800; 160 TABLET ORAL at 10:28

## 2024-06-14 RX ADMIN — POLYETHYLENE GLYCOL 3350 17 GRAM(S): 1 POWDER ORAL at 11:12

## 2024-06-14 RX ADMIN — ALLOPURINOL 100 MILLIGRAM(S): 300 TABLET ORAL at 11:12

## 2024-06-14 RX ADMIN — Medication 300 MILLIGRAM(S): at 17:12

## 2024-06-14 RX ADMIN — VANCOMYCIN HYDROCHLORIDE 250 MILLIGRAM(S): KIT at 17:14

## 2024-06-15 LAB
ALBUMIN SERPL ELPH-MCNC: 3.5 G/DL — SIGNIFICANT CHANGE UP (ref 3.3–5)
ALP SERPL-CCNC: 166 U/L — HIGH (ref 40–120)
ALT FLD-CCNC: 35 U/L — SIGNIFICANT CHANGE UP (ref 10–45)
ANION GAP SERPL CALC-SCNC: 15 MMOL/L — SIGNIFICANT CHANGE UP (ref 5–17)
AST SERPL-CCNC: 13 U/L — SIGNIFICANT CHANGE UP (ref 10–40)
BASOPHILS # BLD AUTO: 0.12 K/UL — SIGNIFICANT CHANGE UP (ref 0–0.2)
BASOPHILS NFR BLD AUTO: 1.8 % — SIGNIFICANT CHANGE UP (ref 0–2)
BILIRUB SERPL-MCNC: 0.6 MG/DL — SIGNIFICANT CHANGE UP (ref 0.2–1.2)
BUN SERPL-MCNC: 32 MG/DL — HIGH (ref 7–23)
CALCIUM SERPL-MCNC: 8.6 MG/DL — SIGNIFICANT CHANGE UP (ref 8.4–10.5)
CHLORIDE SERPL-SCNC: 97 MMOL/L — SIGNIFICANT CHANGE UP (ref 96–108)
CO2 SERPL-SCNC: 22 MMOL/L — SIGNIFICANT CHANGE UP (ref 22–31)
CREAT SERPL-MCNC: 2.66 MG/DL — HIGH (ref 0.5–1.3)
EGFR: 23 ML/MIN/1.73M2 — LOW
EOSINOPHIL # BLD AUTO: 0.19 K/UL — SIGNIFICANT CHANGE UP (ref 0–0.5)
EOSINOPHIL NFR BLD AUTO: 2.9 % — SIGNIFICANT CHANGE UP (ref 0–6)
GLUCOSE SERPL-MCNC: 92 MG/DL — SIGNIFICANT CHANGE UP (ref 70–99)
HCT VFR BLD CALC: 31.4 % — LOW (ref 39–50)
HGB BLD-MCNC: 10.2 G/DL — LOW (ref 13–17)
IMM GRANULOCYTES NFR BLD AUTO: 4.9 % — HIGH (ref 0–0.9)
LYMPHOCYTES # BLD AUTO: 1.2 K/UL — SIGNIFICANT CHANGE UP (ref 1–3.3)
LYMPHOCYTES # BLD AUTO: 18.3 % — SIGNIFICANT CHANGE UP (ref 13–44)
MCHC RBC-ENTMCNC: 32.5 GM/DL — SIGNIFICANT CHANGE UP (ref 32–36)
MCHC RBC-ENTMCNC: 37.8 PG — HIGH (ref 27–34)
MCV RBC AUTO: 116.3 FL — HIGH (ref 80–100)
MONOCYTES # BLD AUTO: 0.41 K/UL — SIGNIFICANT CHANGE UP (ref 0–0.9)
MONOCYTES NFR BLD AUTO: 6.3 % — SIGNIFICANT CHANGE UP (ref 2–14)
NEUTROPHILS # BLD AUTO: 4.32 K/UL — SIGNIFICANT CHANGE UP (ref 1.8–7.4)
NEUTROPHILS NFR BLD AUTO: 65.8 % — SIGNIFICANT CHANGE UP (ref 43–77)
NRBC # BLD: 0 /100 WBCS — SIGNIFICANT CHANGE UP (ref 0–0)
PLATELET # BLD AUTO: 202 K/UL — SIGNIFICANT CHANGE UP (ref 150–400)
POTASSIUM SERPL-MCNC: 4 MMOL/L — SIGNIFICANT CHANGE UP (ref 3.5–5.3)
POTASSIUM SERPL-SCNC: 4 MMOL/L — SIGNIFICANT CHANGE UP (ref 3.5–5.3)
PROT SERPL-MCNC: 6.3 G/DL — SIGNIFICANT CHANGE UP (ref 6–8.3)
RBC # BLD: 2.7 M/UL — LOW (ref 4.2–5.8)
RBC # FLD: 18.3 % — HIGH (ref 10.3–14.5)
SODIUM SERPL-SCNC: 134 MMOL/L — LOW (ref 135–145)
WBC # BLD: 6.56 K/UL — SIGNIFICANT CHANGE UP (ref 3.8–10.5)
WBC # FLD AUTO: 6.56 K/UL — SIGNIFICANT CHANGE UP (ref 3.8–10.5)

## 2024-06-15 PROCEDURE — 99233 SBSQ HOSP IP/OBS HIGH 50: CPT

## 2024-06-15 RX ADMIN — AMLODIPINE BESYLATE 5 MILLIGRAM(S): 2.5 TABLET ORAL at 05:54

## 2024-06-15 RX ADMIN — Medication 1 TABLET(S): at 11:34

## 2024-06-15 RX ADMIN — Medication 150 MILLIGRAM(S): at 05:55

## 2024-06-15 RX ADMIN — Medication 300 MILLIGRAM(S): at 17:55

## 2024-06-15 RX ADMIN — Medication 1 APPLICATION(S): at 11:35

## 2024-06-15 RX ADMIN — Medication 400 MILLIGRAM(S): at 05:55

## 2024-06-15 RX ADMIN — Medication 400 MILLIGRAM(S): at 17:55

## 2024-06-15 RX ADMIN — SERTRALINE HYDROCHLORIDE 25 MILLIGRAM(S): 100 TABLET, FILM COATED ORAL at 11:34

## 2024-06-15 RX ADMIN — ALLOPURINOL 100 MILLIGRAM(S): 300 TABLET ORAL at 11:34

## 2024-06-15 RX ADMIN — VANCOMYCIN HYDROCHLORIDE 250 MILLIGRAM(S): KIT at 17:55

## 2024-06-15 RX ADMIN — Medication 2000 UNIT(S): at 11:34

## 2024-06-15 RX ADMIN — Medication 300 MILLIGRAM(S): at 05:55

## 2024-06-15 RX ADMIN — Medication 1 APPLICATION(S): at 13:50

## 2024-06-15 RX ADMIN — SULFAMETHOXAZOLE AND TRIMETHOPRIM 512.5 MILLIGRAM(S): 800; 160 TABLET ORAL at 10:19

## 2024-06-15 RX ADMIN — CLOPIDOGREL BISULFATE 75 MILLIGRAM(S): 75 TABLET, FILM COATED ORAL at 11:37

## 2024-06-15 RX ADMIN — ASPIRIN 81 MILLIGRAM(S): 325 TABLET, FILM COATED ORAL at 11:34

## 2024-06-16 LAB
ALBUMIN SERPL ELPH-MCNC: 3.6 G/DL — SIGNIFICANT CHANGE UP (ref 3.3–5)
ALP SERPL-CCNC: 158 U/L — HIGH (ref 40–120)
ALT FLD-CCNC: 30 U/L — SIGNIFICANT CHANGE UP (ref 10–45)
ANION GAP SERPL CALC-SCNC: 14 MMOL/L — SIGNIFICANT CHANGE UP (ref 5–17)
AST SERPL-CCNC: 15 U/L — SIGNIFICANT CHANGE UP (ref 10–40)
BASOPHILS # BLD AUTO: 0.12 K/UL — SIGNIFICANT CHANGE UP (ref 0–0.2)
BASOPHILS NFR BLD AUTO: 1.8 % — SIGNIFICANT CHANGE UP (ref 0–2)
BILIRUB SERPL-MCNC: 0.5 MG/DL — SIGNIFICANT CHANGE UP (ref 0.2–1.2)
BUN SERPL-MCNC: 33 MG/DL — HIGH (ref 7–23)
CALCIUM SERPL-MCNC: 8.8 MG/DL — SIGNIFICANT CHANGE UP (ref 8.4–10.5)
CHLORIDE SERPL-SCNC: 97 MMOL/L — SIGNIFICANT CHANGE UP (ref 96–108)
CO2 SERPL-SCNC: 24 MMOL/L — SIGNIFICANT CHANGE UP (ref 22–31)
CREAT SERPL-MCNC: 2.74 MG/DL — HIGH (ref 0.5–1.3)
EGFR: 22 ML/MIN/1.73M2 — LOW
EOSINOPHIL # BLD AUTO: 0.19 K/UL — SIGNIFICANT CHANGE UP (ref 0–0.5)
EOSINOPHIL NFR BLD AUTO: 2.8 % — SIGNIFICANT CHANGE UP (ref 0–6)
GLUCOSE SERPL-MCNC: 80 MG/DL — SIGNIFICANT CHANGE UP (ref 70–99)
HCT VFR BLD CALC: 32.5 % — LOW (ref 39–50)
HGB BLD-MCNC: 10.3 G/DL — LOW (ref 13–17)
IMM GRANULOCYTES NFR BLD AUTO: 3.9 % — HIGH (ref 0–0.9)
LYMPHOCYTES # BLD AUTO: 1.13 K/UL — SIGNIFICANT CHANGE UP (ref 1–3.3)
LYMPHOCYTES # BLD AUTO: 16.7 % — SIGNIFICANT CHANGE UP (ref 13–44)
MCHC RBC-ENTMCNC: 31.7 GM/DL — LOW (ref 32–36)
MCHC RBC-ENTMCNC: 37.9 PG — HIGH (ref 27–34)
MCV RBC AUTO: 119.5 FL — HIGH (ref 80–100)
MONOCYTES # BLD AUTO: 0.37 K/UL — SIGNIFICANT CHANGE UP (ref 0–0.9)
MONOCYTES NFR BLD AUTO: 5.5 % — SIGNIFICANT CHANGE UP (ref 2–14)
NEUTROPHILS # BLD AUTO: 4.68 K/UL — SIGNIFICANT CHANGE UP (ref 1.8–7.4)
NEUTROPHILS NFR BLD AUTO: 69.3 % — SIGNIFICANT CHANGE UP (ref 43–77)
NRBC # BLD: 0 /100 WBCS — SIGNIFICANT CHANGE UP (ref 0–0)
PLATELET # BLD AUTO: 208 K/UL — SIGNIFICANT CHANGE UP (ref 150–400)
POTASSIUM SERPL-MCNC: 4.1 MMOL/L — SIGNIFICANT CHANGE UP (ref 3.5–5.3)
POTASSIUM SERPL-SCNC: 4.1 MMOL/L — SIGNIFICANT CHANGE UP (ref 3.5–5.3)
PROT SERPL-MCNC: 6.7 G/DL — SIGNIFICANT CHANGE UP (ref 6–8.3)
RBC # BLD: 2.72 M/UL — LOW (ref 4.2–5.8)
RBC # FLD: 18.2 % — HIGH (ref 10.3–14.5)
SODIUM SERPL-SCNC: 135 MMOL/L — SIGNIFICANT CHANGE UP (ref 135–145)
VANCOMYCIN TROUGH SERPL-MCNC: 18.7 UG/ML — SIGNIFICANT CHANGE UP (ref 10–20)
WBC # BLD: 6.75 K/UL — SIGNIFICANT CHANGE UP (ref 3.8–10.5)
WBC # FLD AUTO: 6.75 K/UL — SIGNIFICANT CHANGE UP (ref 3.8–10.5)

## 2024-06-16 PROCEDURE — 99232 SBSQ HOSP IP/OBS MODERATE 35: CPT | Mod: GC

## 2024-06-16 RX ORDER — DEXTROSE MONOHYDRATE AND SODIUM CHLORIDE 5; .3 G/100ML; G/100ML
1000 INJECTION, SOLUTION INTRAVENOUS
Refills: 0 | Status: DISCONTINUED | OUTPATIENT
Start: 2024-06-16 | End: 2024-06-16

## 2024-06-16 RX ORDER — SODIUM CHLORIDE 0.9 % (FLUSH) 0.9 %
1000 SYRINGE (ML) INJECTION
Refills: 0 | Status: DISCONTINUED | OUTPATIENT
Start: 2024-06-16 | End: 2024-06-17

## 2024-06-16 RX ORDER — VANCOMYCIN HYDROCHLORIDE 50 MG/ML
750 KIT ORAL EVERY 24 HOURS
Refills: 0 | Status: DISCONTINUED | OUTPATIENT
Start: 2024-06-17 | End: 2024-06-19

## 2024-06-16 RX ADMIN — ALLOPURINOL 100 MILLIGRAM(S): 300 TABLET ORAL at 11:00

## 2024-06-16 RX ADMIN — Medication 1 APPLICATION(S): at 11:01

## 2024-06-16 RX ADMIN — Medication 2000 UNIT(S): at 11:00

## 2024-06-16 RX ADMIN — Medication 150 MILLIGRAM(S): at 05:21

## 2024-06-16 RX ADMIN — SULFAMETHOXAZOLE AND TRIMETHOPRIM 512.5 MILLIGRAM(S): 800; 160 TABLET ORAL at 09:59

## 2024-06-16 RX ADMIN — AMLODIPINE BESYLATE 5 MILLIGRAM(S): 2.5 TABLET ORAL at 05:21

## 2024-06-16 RX ADMIN — Medication 300 MILLIGRAM(S): at 05:21

## 2024-06-16 RX ADMIN — Medication 1 APPLICATION(S): at 11:04

## 2024-06-16 RX ADMIN — SULFAMETHOXAZOLE AND TRIMETHOPRIM 512.5 MILLIGRAM(S): 800; 160 TABLET ORAL at 21:49

## 2024-06-16 RX ADMIN — SERTRALINE HYDROCHLORIDE 25 MILLIGRAM(S): 100 TABLET, FILM COATED ORAL at 11:00

## 2024-06-16 RX ADMIN — POLYETHYLENE GLYCOL 3350 17 GRAM(S): 1 POWDER ORAL at 11:01

## 2024-06-16 RX ADMIN — Medication 1 TABLET(S): at 11:04

## 2024-06-16 RX ADMIN — ASPIRIN 81 MILLIGRAM(S): 325 TABLET, FILM COATED ORAL at 11:00

## 2024-06-16 RX ADMIN — CLOPIDOGREL BISULFATE 75 MILLIGRAM(S): 75 TABLET, FILM COATED ORAL at 11:00

## 2024-06-16 RX ADMIN — Medication 300 MILLIGRAM(S): at 17:09

## 2024-06-16 RX ADMIN — Medication 50 MILLILITER(S): at 10:53

## 2024-06-16 RX ADMIN — Medication 400 MILLIGRAM(S): at 17:10

## 2024-06-16 RX ADMIN — Medication 400 MILLIGRAM(S): at 05:21

## 2024-06-17 ENCOUNTER — LABORATORY RESULT (OUTPATIENT)
Age: 84
End: 2024-06-17

## 2024-06-17 LAB
ANION GAP SERPL CALC-SCNC: 13 MMOL/L — SIGNIFICANT CHANGE UP (ref 5–17)
BASOPHILS # BLD AUTO: 0.11 K/UL — SIGNIFICANT CHANGE UP (ref 0–0.2)
BASOPHILS NFR BLD AUTO: 1.5 % — SIGNIFICANT CHANGE UP (ref 0–2)
BUN SERPL-MCNC: 32 MG/DL — HIGH (ref 7–23)
CALCIUM SERPL-MCNC: 8.6 MG/DL — SIGNIFICANT CHANGE UP (ref 8.4–10.5)
CHLORIDE SERPL-SCNC: 100 MMOL/L — SIGNIFICANT CHANGE UP (ref 96–108)
CO2 SERPL-SCNC: 20 MMOL/L — LOW (ref 22–31)
CREAT SERPL-MCNC: 2.4 MG/DL — HIGH (ref 0.5–1.3)
EGFR: 26 ML/MIN/1.73M2 — LOW
EOSINOPHIL # BLD AUTO: 0.21 K/UL — SIGNIFICANT CHANGE UP (ref 0–0.5)
EOSINOPHIL NFR BLD AUTO: 2.8 % — SIGNIFICANT CHANGE UP (ref 0–6)
GLUCOSE BLDC GLUCOMTR-MCNC: 114 MG/DL — HIGH (ref 70–99)
GLUCOSE SERPL-MCNC: 87 MG/DL — SIGNIFICANT CHANGE UP (ref 70–99)
HCT VFR BLD CALC: 31.2 % — LOW (ref 39–50)
HGB BLD-MCNC: 10.3 G/DL — LOW (ref 13–17)
IMM GRANULOCYTES NFR BLD AUTO: 3.2 % — HIGH (ref 0–0.9)
LYMPHOCYTES # BLD AUTO: 1.1 K/UL — SIGNIFICANT CHANGE UP (ref 1–3.3)
LYMPHOCYTES # BLD AUTO: 14.9 % — SIGNIFICANT CHANGE UP (ref 13–44)
MCHC RBC-ENTMCNC: 33 GM/DL — SIGNIFICANT CHANGE UP (ref 32–36)
MCHC RBC-ENTMCNC: 37.9 PG — HIGH (ref 27–34)
MCV RBC AUTO: 114.7 FL — HIGH (ref 80–100)
MONOCYTES # BLD AUTO: 0.46 K/UL — SIGNIFICANT CHANGE UP (ref 0–0.9)
MONOCYTES NFR BLD AUTO: 6.2 % — SIGNIFICANT CHANGE UP (ref 2–14)
NEUTROPHILS # BLD AUTO: 5.27 K/UL — SIGNIFICANT CHANGE UP (ref 1.8–7.4)
NEUTROPHILS NFR BLD AUTO: 71.4 % — SIGNIFICANT CHANGE UP (ref 43–77)
NRBC # BLD: 0 /100 WBCS — SIGNIFICANT CHANGE UP (ref 0–0)
PLATELET # BLD AUTO: 185 K/UL — SIGNIFICANT CHANGE UP (ref 150–400)
POTASSIUM SERPL-MCNC: 4.4 MMOL/L — SIGNIFICANT CHANGE UP (ref 3.5–5.3)
POTASSIUM SERPL-SCNC: 4.4 MMOL/L — SIGNIFICANT CHANGE UP (ref 3.5–5.3)
RBC # BLD: 2.72 M/UL — LOW (ref 4.2–5.8)
RBC # FLD: 18.2 % — HIGH (ref 10.3–14.5)
SODIUM SERPL-SCNC: 133 MMOL/L — LOW (ref 135–145)
WBC # BLD: 7.39 K/UL — SIGNIFICANT CHANGE UP (ref 3.8–10.5)
WBC # FLD AUTO: 7.39 K/UL — SIGNIFICANT CHANGE UP (ref 3.8–10.5)

## 2024-06-17 PROCEDURE — 71045 X-RAY EXAM CHEST 1 VIEW: CPT | Mod: 26

## 2024-06-17 PROCEDURE — 93010 ELECTROCARDIOGRAM REPORT: CPT

## 2024-06-17 PROCEDURE — 99232 SBSQ HOSP IP/OBS MODERATE 35: CPT

## 2024-06-17 PROCEDURE — 99232 SBSQ HOSP IP/OBS MODERATE 35: CPT | Mod: GC

## 2024-06-17 RX ORDER — SACUBITRIL AND VALSARTAN 97; 103 MG/1; MG/1
1 TABLET, FILM COATED ORAL
Refills: 0 | Status: DISCONTINUED | OUTPATIENT
Start: 2024-06-17 | End: 2024-06-17

## 2024-06-17 RX ORDER — SULFAMETHOXAZOLE AND TRIMETHOPRIM 800; 160 MG/1; MG/1
200 TABLET ORAL
Qty: 1 | Refills: 0
Start: 2024-06-17 | End: 2024-06-30

## 2024-06-17 RX ORDER — FLUCONAZOLE 200 MG
200 TABLET ORAL DAILY
Refills: 0 | Status: DISCONTINUED | OUTPATIENT
Start: 2024-06-17 | End: 2024-06-19

## 2024-06-17 RX ORDER — VANCOMYCIN HYDROCHLORIDE 50 MG/ML
750 KIT ORAL
Qty: 1 | Refills: 0
Start: 2024-06-17 | End: 2024-06-30

## 2024-06-17 RX ADMIN — SULFAMETHOXAZOLE AND TRIMETHOPRIM 512.5 MILLIGRAM(S): 800; 160 TABLET ORAL at 22:13

## 2024-06-17 RX ADMIN — Medication 300 MILLIGRAM(S): at 17:01

## 2024-06-17 RX ADMIN — CLOPIDOGREL BISULFATE 75 MILLIGRAM(S): 75 TABLET, FILM COATED ORAL at 11:40

## 2024-06-17 RX ADMIN — ALLOPURINOL 100 MILLIGRAM(S): 300 TABLET ORAL at 11:40

## 2024-06-17 RX ADMIN — Medication 2000 UNIT(S): at 11:39

## 2024-06-17 RX ADMIN — Medication 2 TABLET(S): at 21:31

## 2024-06-17 RX ADMIN — Medication 1 TABLET(S): at 11:40

## 2024-06-17 RX ADMIN — Medication 400 MILLIGRAM(S): at 05:57

## 2024-06-17 RX ADMIN — Medication 400 MILLIGRAM(S): at 17:01

## 2024-06-17 RX ADMIN — SULFAMETHOXAZOLE AND TRIMETHOPRIM 512.5 MILLIGRAM(S): 800; 160 TABLET ORAL at 11:39

## 2024-06-17 RX ADMIN — AMLODIPINE BESYLATE 5 MILLIGRAM(S): 2.5 TABLET ORAL at 05:57

## 2024-06-17 RX ADMIN — Medication 300 MILLIGRAM(S): at 06:42

## 2024-06-17 RX ADMIN — ASPIRIN 81 MILLIGRAM(S): 325 TABLET, FILM COATED ORAL at 11:40

## 2024-06-17 RX ADMIN — Medication 1 APPLICATION(S): at 11:41

## 2024-06-17 RX ADMIN — Medication 150 MILLIGRAM(S): at 05:57

## 2024-06-17 RX ADMIN — VANCOMYCIN HYDROCHLORIDE 250 MILLIGRAM(S): KIT at 05:57

## 2024-06-17 RX ADMIN — SERTRALINE HYDROCHLORIDE 25 MILLIGRAM(S): 100 TABLET, FILM COATED ORAL at 11:40

## 2024-06-17 RX ADMIN — Medication 1 APPLICATION(S): at 11:40

## 2024-06-18 LAB
ANION GAP SERPL CALC-SCNC: 14 MMOL/L — SIGNIFICANT CHANGE UP (ref 5–17)
BASOPHILS # BLD AUTO: 0.13 K/UL — SIGNIFICANT CHANGE UP (ref 0–0.2)
BASOPHILS NFR BLD AUTO: 1.5 % — SIGNIFICANT CHANGE UP (ref 0–2)
BUN SERPL-MCNC: 31 MG/DL — HIGH (ref 7–23)
CALCIUM SERPL-MCNC: 9 MG/DL — SIGNIFICANT CHANGE UP (ref 8.4–10.5)
CHLORIDE SERPL-SCNC: 100 MMOL/L — SIGNIFICANT CHANGE UP (ref 96–108)
CO2 SERPL-SCNC: 21 MMOL/L — LOW (ref 22–31)
CREAT SERPL-MCNC: 2.42 MG/DL — HIGH (ref 0.5–1.3)
CULTURE RESULTS: SIGNIFICANT CHANGE UP
CULTURE RESULTS: SIGNIFICANT CHANGE UP
EGFR: 26 ML/MIN/1.73M2 — LOW
EOSINOPHIL # BLD AUTO: 0.24 K/UL — SIGNIFICANT CHANGE UP (ref 0–0.5)
EOSINOPHIL NFR BLD AUTO: 2.7 % — SIGNIFICANT CHANGE UP (ref 0–6)
GLUCOSE SERPL-MCNC: 97 MG/DL — SIGNIFICANT CHANGE UP (ref 70–99)
HCT VFR BLD CALC: 33.7 % — LOW (ref 39–50)
HGB BLD-MCNC: 11.3 G/DL — LOW (ref 13–17)
IMM GRANULOCYTES NFR BLD AUTO: 2.1 % — HIGH (ref 0–0.9)
LYMPHOCYTES # BLD AUTO: 1.31 K/UL — SIGNIFICANT CHANGE UP (ref 1–3.3)
LYMPHOCYTES # BLD AUTO: 14.8 % — SIGNIFICANT CHANGE UP (ref 13–44)
MCHC RBC-ENTMCNC: 33.5 GM/DL — SIGNIFICANT CHANGE UP (ref 32–36)
MCHC RBC-ENTMCNC: 38.3 PG — HIGH (ref 27–34)
MCV RBC AUTO: 114.2 FL — HIGH (ref 80–100)
MONOCYTES # BLD AUTO: 0.43 K/UL — SIGNIFICANT CHANGE UP (ref 0–0.9)
MONOCYTES NFR BLD AUTO: 4.9 % — SIGNIFICANT CHANGE UP (ref 2–14)
NEUTROPHILS # BLD AUTO: 6.54 K/UL — SIGNIFICANT CHANGE UP (ref 1.8–7.4)
NEUTROPHILS NFR BLD AUTO: 74 % — SIGNIFICANT CHANGE UP (ref 43–77)
NRBC # BLD: 0 /100 WBCS — SIGNIFICANT CHANGE UP (ref 0–0)
PLATELET # BLD AUTO: 204 K/UL — SIGNIFICANT CHANGE UP (ref 150–400)
POTASSIUM SERPL-MCNC: 4.5 MMOL/L — SIGNIFICANT CHANGE UP (ref 3.5–5.3)
POTASSIUM SERPL-SCNC: 4.5 MMOL/L — SIGNIFICANT CHANGE UP (ref 3.5–5.3)
RBC # BLD: 2.95 M/UL — LOW (ref 4.2–5.8)
RBC # FLD: 18.2 % — HIGH (ref 10.3–14.5)
SODIUM SERPL-SCNC: 135 MMOL/L — SIGNIFICANT CHANGE UP (ref 135–145)
SPECIMEN SOURCE: SIGNIFICANT CHANGE UP
SPECIMEN SOURCE: SIGNIFICANT CHANGE UP
VANCOMYCIN TROUGH SERPL-MCNC: 16.1 UG/ML — SIGNIFICANT CHANGE UP (ref 10–20)
WBC # BLD: 8.84 K/UL — SIGNIFICANT CHANGE UP (ref 3.8–10.5)
WBC # FLD AUTO: 8.84 K/UL — SIGNIFICANT CHANGE UP (ref 3.8–10.5)

## 2024-06-18 PROCEDURE — 99233 SBSQ HOSP IP/OBS HIGH 50: CPT | Mod: GC

## 2024-06-18 PROCEDURE — 99233 SBSQ HOSP IP/OBS HIGH 50: CPT

## 2024-06-18 RX ORDER — FUROSEMIDE 10 MG/ML
40 INJECTION, SOLUTION INTRAMUSCULAR; INTRAVENOUS ONCE
Refills: 0 | Status: COMPLETED | OUTPATIENT
Start: 2024-06-18 | End: 2024-06-18

## 2024-06-18 RX ORDER — FUROSEMIDE 10 MG/ML
40 INJECTION, SOLUTION INTRAMUSCULAR; INTRAVENOUS DAILY
Refills: 0 | Status: DISCONTINUED | OUTPATIENT
Start: 2024-06-19 | End: 2024-06-19

## 2024-06-18 RX ORDER — SULFAMETHOXAZOLE AND TRIMETHOPRIM 800; 160 MG/1; MG/1
1 TABLET ORAL
Refills: 0 | Status: DISCONTINUED | OUTPATIENT
Start: 2024-06-18 | End: 2024-06-19

## 2024-06-18 RX ADMIN — VANCOMYCIN HYDROCHLORIDE 250 MILLIGRAM(S): KIT at 05:43

## 2024-06-18 RX ADMIN — Medication 300 MILLIGRAM(S): at 17:12

## 2024-06-18 RX ADMIN — ALLOPURINOL 100 MILLIGRAM(S): 300 TABLET ORAL at 11:27

## 2024-06-18 RX ADMIN — Medication 1 APPLICATION(S): at 11:26

## 2024-06-18 RX ADMIN — Medication 650 MILLIGRAM(S): at 16:53

## 2024-06-18 RX ADMIN — SULFAMETHOXAZOLE AND TRIMETHOPRIM 1 TABLET(S): 800; 160 TABLET ORAL at 21:24

## 2024-06-18 RX ADMIN — Medication 200 MILLIGRAM(S): at 11:27

## 2024-06-18 RX ADMIN — Medication 1 TABLET(S): at 11:28

## 2024-06-18 RX ADMIN — Medication 400 MILLIGRAM(S): at 17:12

## 2024-06-18 RX ADMIN — SULFAMETHOXAZOLE AND TRIMETHOPRIM 512.5 MILLIGRAM(S): 800; 160 TABLET ORAL at 11:28

## 2024-06-18 RX ADMIN — Medication 2000 UNIT(S): at 11:28

## 2024-06-18 RX ADMIN — FUROSEMIDE 40 MILLIGRAM(S): 10 INJECTION, SOLUTION INTRAMUSCULAR; INTRAVENOUS at 11:28

## 2024-06-18 RX ADMIN — Medication 2 TABLET(S): at 21:23

## 2024-06-18 RX ADMIN — Medication 300 MILLIGRAM(S): at 05:40

## 2024-06-18 RX ADMIN — Medication 1 APPLICATION(S): at 11:25

## 2024-06-18 RX ADMIN — Medication 400 MILLIGRAM(S): at 05:40

## 2024-06-18 RX ADMIN — SERTRALINE HYDROCHLORIDE 25 MILLIGRAM(S): 100 TABLET, FILM COATED ORAL at 11:28

## 2024-06-18 RX ADMIN — Medication 650 MILLIGRAM(S): at 17:53

## 2024-06-18 RX ADMIN — CLOPIDOGREL BISULFATE 75 MILLIGRAM(S): 75 TABLET, FILM COATED ORAL at 11:27

## 2024-06-18 RX ADMIN — ASPIRIN 81 MILLIGRAM(S): 325 TABLET, FILM COATED ORAL at 11:27

## 2024-06-19 ENCOUNTER — TRANSCRIPTION ENCOUNTER (OUTPATIENT)
Age: 84
End: 2024-06-19

## 2024-06-19 VITALS
DIASTOLIC BLOOD PRESSURE: 73 MMHG | HEART RATE: 56 BPM | RESPIRATION RATE: 18 BRPM | SYSTOLIC BLOOD PRESSURE: 121 MMHG | OXYGEN SATURATION: 95 % | TEMPERATURE: 98 F

## 2024-06-19 LAB
CULTURE RESULTS: SIGNIFICANT CHANGE UP
CULTURE RESULTS: SIGNIFICANT CHANGE UP
SPECIMEN SOURCE: SIGNIFICANT CHANGE UP
SPECIMEN SOURCE: SIGNIFICANT CHANGE UP

## 2024-06-19 PROCEDURE — 84443 ASSAY THYROID STIM HORMONE: CPT

## 2024-06-19 PROCEDURE — 87077 CULTURE AEROBIC IDENTIFY: CPT

## 2024-06-19 PROCEDURE — 82248 BILIRUBIN DIRECT: CPT

## 2024-06-19 PROCEDURE — 82962 GLUCOSE BLOOD TEST: CPT

## 2024-06-19 PROCEDURE — 82947 ASSAY GLUCOSE BLOOD QUANT: CPT

## 2024-06-19 PROCEDURE — 80202 ASSAY OF VANCOMYCIN: CPT

## 2024-06-19 PROCEDURE — A9537: CPT

## 2024-06-19 PROCEDURE — 86900 BLOOD TYPING SEROLOGIC ABO: CPT

## 2024-06-19 PROCEDURE — 99291 CRITICAL CARE FIRST HOUR: CPT | Mod: 25

## 2024-06-19 PROCEDURE — 87086 URINE CULTURE/COLONY COUNT: CPT

## 2024-06-19 PROCEDURE — 82746 ASSAY OF FOLIC ACID SERUM: CPT

## 2024-06-19 PROCEDURE — 85045 AUTOMATED RETICULOCYTE COUNT: CPT

## 2024-06-19 PROCEDURE — 97161 PT EVAL LOW COMPLEX 20 MIN: CPT

## 2024-06-19 PROCEDURE — 82607 VITAMIN B-12: CPT

## 2024-06-19 PROCEDURE — 83921 ORGANIC ACID SINGLE QUANT: CPT

## 2024-06-19 PROCEDURE — 84100 ASSAY OF PHOSPHORUS: CPT

## 2024-06-19 PROCEDURE — 76705 ECHO EXAM OF ABDOMEN: CPT

## 2024-06-19 PROCEDURE — 83735 ASSAY OF MAGNESIUM: CPT

## 2024-06-19 PROCEDURE — 85025 COMPLETE CBC W/AUTO DIFF WBC: CPT

## 2024-06-19 PROCEDURE — 99232 SBSQ HOSP IP/OBS MODERATE 35: CPT

## 2024-06-19 PROCEDURE — 96375 TX/PRO/DX INJ NEW DRUG ADDON: CPT

## 2024-06-19 PROCEDURE — 82803 BLOOD GASES ANY COMBINATION: CPT

## 2024-06-19 PROCEDURE — 87040 BLOOD CULTURE FOR BACTERIA: CPT

## 2024-06-19 PROCEDURE — 87641 MR-STAPH DNA AMP PROBE: CPT

## 2024-06-19 PROCEDURE — 84145 PROCALCITONIN (PCT): CPT

## 2024-06-19 PROCEDURE — 82330 ASSAY OF CALCIUM: CPT

## 2024-06-19 PROCEDURE — 36415 COLL VENOUS BLD VENIPUNCTURE: CPT

## 2024-06-19 PROCEDURE — C8929: CPT

## 2024-06-19 PROCEDURE — 74183 MRI ABD W/O CNTR FLWD CNTR: CPT | Mod: MC

## 2024-06-19 PROCEDURE — 87150 DNA/RNA AMPLIFIED PROBE: CPT

## 2024-06-19 PROCEDURE — 85014 HEMATOCRIT: CPT

## 2024-06-19 PROCEDURE — 83036 HEMOGLOBIN GLYCOSYLATED A1C: CPT

## 2024-06-19 PROCEDURE — 84295 ASSAY OF SERUM SODIUM: CPT

## 2024-06-19 PROCEDURE — 71046 X-RAY EXAM CHEST 2 VIEWS: CPT

## 2024-06-19 PROCEDURE — 71045 X-RAY EXAM CHEST 1 VIEW: CPT

## 2024-06-19 PROCEDURE — 81001 URINALYSIS AUTO W/SCOPE: CPT

## 2024-06-19 PROCEDURE — 86850 RBC ANTIBODY SCREEN: CPT

## 2024-06-19 PROCEDURE — 85018 HEMOGLOBIN: CPT

## 2024-06-19 PROCEDURE — 85610 PROTHROMBIN TIME: CPT

## 2024-06-19 PROCEDURE — 99239 HOSP IP/OBS DSCHRG MGMT >30: CPT | Mod: GC

## 2024-06-19 PROCEDURE — 84480 ASSAY TRIIODOTHYRONINE (T3): CPT

## 2024-06-19 PROCEDURE — 83880 ASSAY OF NATRIURETIC PEPTIDE: CPT

## 2024-06-19 PROCEDURE — 87186 SC STD MICRODIL/AGAR DIL: CPT

## 2024-06-19 PROCEDURE — 36569 INSJ PICC 5 YR+ W/O IMAGING: CPT

## 2024-06-19 PROCEDURE — 99292 CRITICAL CARE ADDL 30 MIN: CPT

## 2024-06-19 PROCEDURE — 85730 THROMBOPLASTIN TIME PARTIAL: CPT

## 2024-06-19 PROCEDURE — 84484 ASSAY OF TROPONIN QUANT: CPT

## 2024-06-19 PROCEDURE — 83090 ASSAY OF HOMOCYSTEINE: CPT

## 2024-06-19 PROCEDURE — 99233 SBSQ HOSP IP/OBS HIGH 50: CPT

## 2024-06-19 PROCEDURE — 84550 ASSAY OF BLOOD/URIC ACID: CPT

## 2024-06-19 PROCEDURE — 84436 ASSAY OF TOTAL THYROXINE: CPT

## 2024-06-19 PROCEDURE — 84132 ASSAY OF SERUM POTASSIUM: CPT

## 2024-06-19 PROCEDURE — 93005 ELECTROCARDIOGRAM TRACING: CPT

## 2024-06-19 PROCEDURE — 80048 BASIC METABOLIC PNL TOTAL CA: CPT

## 2024-06-19 PROCEDURE — 82525 ASSAY OF COPPER: CPT

## 2024-06-19 PROCEDURE — 80053 COMPREHEN METABOLIC PANEL: CPT

## 2024-06-19 PROCEDURE — 87640 STAPH A DNA AMP PROBE: CPT

## 2024-06-19 PROCEDURE — 78226 HEPATOBILIARY SYSTEM IMAGING: CPT | Mod: MC

## 2024-06-19 PROCEDURE — 86901 BLOOD TYPING SEROLOGIC RH(D): CPT

## 2024-06-19 PROCEDURE — C1751: CPT

## 2024-06-19 PROCEDURE — 87184 SC STD DISK METHOD PER PLATE: CPT

## 2024-06-19 PROCEDURE — 96374 THER/PROPH/DIAG INJ IV PUSH: CPT

## 2024-06-19 PROCEDURE — 87637 SARSCOV2&INF A&B&RSV AMP PRB: CPT

## 2024-06-19 PROCEDURE — A9585: CPT

## 2024-06-19 PROCEDURE — 82435 ASSAY OF BLOOD CHLORIDE: CPT

## 2024-06-19 PROCEDURE — 83605 ASSAY OF LACTIC ACID: CPT

## 2024-06-19 PROCEDURE — 82247 BILIRUBIN TOTAL: CPT

## 2024-06-19 RX ORDER — SULFAMETHOXAZOLE AND TRIMETHOPRIM 800; 160 MG/1; MG/1
1 TABLET ORAL
Qty: 18 | Refills: 0
Start: 2024-06-19 | End: 2024-06-27

## 2024-06-19 RX ADMIN — SERTRALINE HYDROCHLORIDE 25 MILLIGRAM(S): 100 TABLET, FILM COATED ORAL at 11:41

## 2024-06-19 RX ADMIN — Medication 150 MILLIGRAM(S): at 05:14

## 2024-06-19 RX ADMIN — ALLOPURINOL 100 MILLIGRAM(S): 300 TABLET ORAL at 11:41

## 2024-06-19 RX ADMIN — Medication 400 MILLIGRAM(S): at 05:14

## 2024-06-19 RX ADMIN — Medication 1 TABLET(S): at 11:51

## 2024-06-19 RX ADMIN — Medication 300 MILLIGRAM(S): at 05:14

## 2024-06-19 RX ADMIN — FUROSEMIDE 40 MILLIGRAM(S): 10 INJECTION, SOLUTION INTRAMUSCULAR; INTRAVENOUS at 05:14

## 2024-06-19 RX ADMIN — Medication 1 APPLICATION(S): at 11:52

## 2024-06-19 RX ADMIN — ASPIRIN 81 MILLIGRAM(S): 325 TABLET, FILM COATED ORAL at 11:51

## 2024-06-19 RX ADMIN — Medication 1 APPLICATION(S): at 11:41

## 2024-06-19 RX ADMIN — VANCOMYCIN HYDROCHLORIDE 250 MILLIGRAM(S): KIT at 05:16

## 2024-06-19 RX ADMIN — Medication 2000 UNIT(S): at 11:41

## 2024-06-19 RX ADMIN — Medication 200 MILLIGRAM(S): at 11:51

## 2024-06-19 RX ADMIN — CLOPIDOGREL BISULFATE 75 MILLIGRAM(S): 75 TABLET, FILM COATED ORAL at 11:51

## 2024-06-19 RX ADMIN — AMLODIPINE BESYLATE 5 MILLIGRAM(S): 2.5 TABLET ORAL at 05:14

## 2024-06-19 RX ADMIN — SULFAMETHOXAZOLE AND TRIMETHOPRIM 1 TABLET(S): 800; 160 TABLET ORAL at 05:14

## 2024-06-23 LAB
HOMOCYSTEINE LEVEL: 16.9 UMOL/L — SIGNIFICANT CHANGE UP (ref 0–21.3)
METHYLMALONATE SERPL-SCNC: 578 NMOL/L — HIGH (ref 0–378)

## 2024-06-28 ENCOUNTER — RESULT REVIEW (OUTPATIENT)
Age: 84
End: 2024-06-28

## 2024-06-28 ENCOUNTER — APPOINTMENT (OUTPATIENT)
Dept: HEMATOLOGY ONCOLOGY | Facility: CLINIC | Age: 84
End: 2024-06-28
Payer: MEDICARE

## 2024-06-28 VITALS
DIASTOLIC BLOOD PRESSURE: 71 MMHG | OXYGEN SATURATION: 98 % | WEIGHT: 174.14 LBS | SYSTOLIC BLOOD PRESSURE: 155 MMHG | HEART RATE: 55 BPM | TEMPERATURE: 97.4 F | BODY MASS INDEX: 27.28 KG/M2 | RESPIRATION RATE: 16 BRPM

## 2024-06-28 DIAGNOSIS — Z51.11 ENCOUNTER FOR ANTINEOPLASTIC CHEMOTHERAPY: ICD-10-CM

## 2024-06-28 DIAGNOSIS — D64.9 ANEMIA, UNSPECIFIED: ICD-10-CM

## 2024-06-28 DIAGNOSIS — D61.818 OTHER PANCYTOPENIA: ICD-10-CM

## 2024-06-28 LAB
ALBUMIN SERPL ELPH-MCNC: 4.3 G/DL
ALP BLD-CCNC: 114 U/L
ALT SERPL-CCNC: 22 U/L
ANION GAP SERPL CALC-SCNC: 13 MMOL/L
AST SERPL-CCNC: 18 U/L
BASOPHILS # BLD AUTO: 0.05 K/UL — SIGNIFICANT CHANGE UP (ref 0–0.2)
BASOPHILS NFR BLD AUTO: 0.9 % — SIGNIFICANT CHANGE UP (ref 0–2)
BILIRUB SERPL-MCNC: 0.4 MG/DL
BUN SERPL-MCNC: 36 MG/DL
CALCIUM SERPL-MCNC: 9.4 MG/DL
CHLORIDE SERPL-SCNC: 103 MMOL/L
CO2 SERPL-SCNC: 24 MMOL/L
CREAT SERPL-MCNC: 2.45 MG/DL
EGFR: 25 ML/MIN/1.73M2
EOSINOPHIL # BLD AUTO: 0.22 K/UL — SIGNIFICANT CHANGE UP (ref 0–0.5)
EOSINOPHIL NFR BLD AUTO: 4.1 % — SIGNIFICANT CHANGE UP (ref 0–6)
GLUCOSE SERPL-MCNC: 155 MG/DL
HCT VFR BLD CALC: 33.6 % — LOW (ref 39–50)
HGB BLD-MCNC: 11.2 G/DL — LOW (ref 13–17)
IMM GRANULOCYTES NFR BLD AUTO: 0.6 % — SIGNIFICANT CHANGE UP (ref 0–0.9)
LDH SERPL-CCNC: 209 U/L
LYMPHOCYTES # BLD AUTO: 1.13 K/UL — SIGNIFICANT CHANGE UP (ref 1–3.3)
LYMPHOCYTES # BLD AUTO: 21.1 % — SIGNIFICANT CHANGE UP (ref 13–44)
MCHC RBC-ENTMCNC: 33.3 G/DL — SIGNIFICANT CHANGE UP (ref 32–36)
MCHC RBC-ENTMCNC: 39.2 PG — HIGH (ref 27–34)
MCV RBC AUTO: 117.5 FL — HIGH (ref 80–100)
MONOCYTES # BLD AUTO: 0.52 K/UL — SIGNIFICANT CHANGE UP (ref 0–0.9)
MONOCYTES NFR BLD AUTO: 9.7 % — SIGNIFICANT CHANGE UP (ref 2–14)
NEUTROPHILS # BLD AUTO: 3.4 K/UL — SIGNIFICANT CHANGE UP (ref 1.8–7.4)
NEUTROPHILS NFR BLD AUTO: 63.6 % — SIGNIFICANT CHANGE UP (ref 43–77)
NRBC # BLD: 0 /100 WBCS — SIGNIFICANT CHANGE UP (ref 0–0)
PLATELET # BLD AUTO: 133 K/UL — LOW (ref 150–400)
POTASSIUM SERPL-SCNC: 5.5 MMOL/L
PROT SERPL-MCNC: 7.4 G/DL
RBC # BLD: 2.86 M/UL — LOW (ref 4.2–5.8)
RBC # FLD: 18.2 % — HIGH (ref 10.3–14.5)
SODIUM SERPL-SCNC: 140 MMOL/L
WBC # BLD: 5.35 K/UL — SIGNIFICANT CHANGE UP (ref 3.8–10.5)
WBC # FLD AUTO: 5.35 K/UL — SIGNIFICANT CHANGE UP (ref 3.8–10.5)

## 2024-06-28 PROCEDURE — 99214 OFFICE O/P EST MOD 30 MIN: CPT

## 2024-07-02 ENCOUNTER — APPOINTMENT (OUTPATIENT)
Dept: INTERNAL MEDICINE | Facility: CLINIC | Age: 84
End: 2024-07-02
Payer: MEDICARE

## 2024-07-02 ENCOUNTER — LABORATORY RESULT (OUTPATIENT)
Age: 84
End: 2024-07-02

## 2024-07-02 VITALS
RESPIRATION RATE: 16 BRPM | WEIGHT: 174 LBS | OXYGEN SATURATION: 97 % | HEART RATE: 64 BPM | TEMPERATURE: 98.1 F | SYSTOLIC BLOOD PRESSURE: 125 MMHG | HEIGHT: 67 IN | BODY MASS INDEX: 27.31 KG/M2 | DIASTOLIC BLOOD PRESSURE: 77 MMHG

## 2024-07-02 DIAGNOSIS — I48.91 UNSPECIFIED ATRIAL FIBRILLATION: ICD-10-CM

## 2024-07-02 DIAGNOSIS — I25.10 ATHEROSCLEROTIC HEART DISEASE OF NATIVE CORONARY ARTERY W/OUT ANGINA PECTORIS: ICD-10-CM

## 2024-07-02 DIAGNOSIS — M10.9 GOUT, UNSPECIFIED: ICD-10-CM

## 2024-07-02 DIAGNOSIS — N18.30 CHRONIC KIDNEY DISEASE, STAGE 3 UNSPECIFIED: ICD-10-CM

## 2024-07-02 DIAGNOSIS — I50.20 UNSPECIFIED SYSTOLIC (CONGESTIVE) HEART FAILURE: ICD-10-CM

## 2024-07-02 DIAGNOSIS — R35.0 FREQUENCY OF MICTURITION: ICD-10-CM

## 2024-07-02 DIAGNOSIS — T85.79XS INFECTION AND INFLAMMATORY REACTION DUE TO OTHER INTERNAL PROSTHETIC DEVICES, IMPLANTS AND GRAFTS, SEQUELA: ICD-10-CM

## 2024-07-02 DIAGNOSIS — A41.9 INFECTION AND INFLAMMATORY REACTION DUE TO OTHER INTERNAL PROSTHETIC DEVICES, IMPLANTS AND GRAFTS, SEQUELA: ICD-10-CM

## 2024-07-02 DIAGNOSIS — D46.C MYELODYSPLASTIC SYNDROME WITH ISOLATED DEL(5Q) CHROMOSOMAL ABNORMALITY: ICD-10-CM

## 2024-07-02 DIAGNOSIS — I35.0 NONRHEUMATIC AORTIC (VALVE) STENOSIS: ICD-10-CM

## 2024-07-02 PROCEDURE — 36415 COLL VENOUS BLD VENIPUNCTURE: CPT

## 2024-07-02 PROCEDURE — 99495 TRANSJ CARE MGMT MOD F2F 14D: CPT

## 2024-07-08 ENCOUNTER — RESULT REVIEW (OUTPATIENT)
Age: 84
End: 2024-07-08

## 2024-07-08 ENCOUNTER — APPOINTMENT (OUTPATIENT)
Dept: INFUSION THERAPY | Facility: HOSPITAL | Age: 84
End: 2024-07-08

## 2024-07-08 ENCOUNTER — APPOINTMENT (OUTPATIENT)
Dept: HEMATOLOGY ONCOLOGY | Facility: CLINIC | Age: 84
End: 2024-07-08

## 2024-07-08 LAB
ALBUMIN SERPL ELPH-MCNC: 4.2 G/DL — SIGNIFICANT CHANGE UP (ref 3.3–5)
ALP SERPL-CCNC: 90 U/L — SIGNIFICANT CHANGE UP (ref 40–120)
ALT FLD-CCNC: 9 U/L — LOW (ref 10–45)
ANION GAP SERPL CALC-SCNC: 13 MMOL/L — SIGNIFICANT CHANGE UP (ref 5–17)
ANION GAP SERPL CALC-SCNC: 15 MMOL/L
APPEARANCE: ABNORMAL
AST SERPL-CCNC: 25 U/L — SIGNIFICANT CHANGE UP (ref 10–40)
BASOPHILS # BLD AUTO: 0.07 K/UL
BASOPHILS # BLD AUTO: 0.09 K/UL — SIGNIFICANT CHANGE UP (ref 0–0.2)
BASOPHILS NFR BLD AUTO: 1.1 %
BASOPHILS NFR BLD AUTO: 1.6 % — SIGNIFICANT CHANGE UP (ref 0–2)
BILIRUB SERPL-MCNC: 0.6 MG/DL — SIGNIFICANT CHANGE UP (ref 0.2–1.2)
BILIRUBIN URINE: NEGATIVE
BLOOD URINE: ABNORMAL
BUN SERPL-MCNC: 31 MG/DL — HIGH (ref 7–23)
BUN SERPL-MCNC: 37 MG/DL
CALCIUM SERPL-MCNC: 9.1 MG/DL
CALCIUM SERPL-MCNC: 9.2 MG/DL — SIGNIFICANT CHANGE UP (ref 8.4–10.5)
CHLORIDE SERPL-SCNC: 100 MMOL/L — SIGNIFICANT CHANGE UP (ref 96–108)
CHLORIDE SERPL-SCNC: 102 MMOL/L
CHOLEST SERPL-MCNC: 151 MG/DL
CO2 SERPL-SCNC: 24 MMOL/L
CO2 SERPL-SCNC: 26 MMOL/L — SIGNIFICANT CHANGE UP (ref 22–31)
COLOR: YELLOW
CREAT SERPL-MCNC: 1.93 MG/DL — HIGH (ref 0.5–1.3)
CREAT SERPL-MCNC: 2.35 MG/DL
EGFR: 27 ML/MIN/1.73M2
EGFR: 34 ML/MIN/1.73M2 — LOW
EOSINOPHIL # BLD AUTO: 0.11 K/UL — SIGNIFICANT CHANGE UP (ref 0–0.5)
EOSINOPHIL # BLD AUTO: 0.21 K/UL
EOSINOPHIL NFR BLD AUTO: 1.9 % — SIGNIFICANT CHANGE UP (ref 0–6)
EOSINOPHIL NFR BLD AUTO: 3.3 %
ESTIMATED AVERAGE GLUCOSE: 105 MG/DL
GLUCOSE QUALITATIVE U: NEGATIVE MG/DL
GLUCOSE SERPL-MCNC: 113 MG/DL — HIGH (ref 70–99)
GLUCOSE SERPL-MCNC: 89 MG/DL
HBA1C MFR BLD HPLC: 5.3 %
HCT VFR BLD CALC: 31.3 % — LOW (ref 39–50)
HCT VFR BLD CALC: 35 %
HDLC SERPL-MCNC: 31 MG/DL
HGB BLD-MCNC: 10.9 G/DL — LOW (ref 13–17)
HGB BLD-MCNC: 11.7 G/DL
IMM GRANULOCYTES NFR BLD AUTO: 1.1 %
IMM GRANULOCYTES NFR BLD AUTO: 4.2 % — HIGH (ref 0–0.9)
KETONES URINE: NEGATIVE MG/DL
LDH SERPL L TO P-CCNC: 205 U/L — SIGNIFICANT CHANGE UP (ref 50–242)
LDLC SERPL CALC-MCNC: 71 MG/DL
LEUKOCYTE ESTERASE URINE: ABNORMAL
LYMPHOCYTES # BLD AUTO: 1.36 K/UL — SIGNIFICANT CHANGE UP (ref 1–3.3)
LYMPHOCYTES # BLD AUTO: 1.57 K/UL
LYMPHOCYTES # BLD AUTO: 24.1 % — SIGNIFICANT CHANGE UP (ref 13–44)
LYMPHOCYTES NFR BLD AUTO: 24.8 %
MAN DIFF?: NORMAL
MCHC RBC-ENTMCNC: 33.4 GM/DL
MCHC RBC-ENTMCNC: 34.8 G/DL — SIGNIFICANT CHANGE UP (ref 32–36)
MCHC RBC-ENTMCNC: 38.9 PG — HIGH (ref 27–34)
MCHC RBC-ENTMCNC: 39 PG
MCV RBC AUTO: 111.8 FL — HIGH (ref 80–100)
MCV RBC AUTO: 116.7 FL
MONOCYTES # BLD AUTO: 0.41 K/UL — SIGNIFICANT CHANGE UP (ref 0–0.9)
MONOCYTES # BLD AUTO: 0.42 K/UL
MONOCYTES NFR BLD AUTO: 6.6 %
MONOCYTES NFR BLD AUTO: 7.3 % — SIGNIFICANT CHANGE UP (ref 2–14)
NEUTROPHILS # BLD AUTO: 3.44 K/UL — SIGNIFICANT CHANGE UP (ref 1.8–7.4)
NEUTROPHILS # BLD AUTO: 3.99 K/UL
NEUTROPHILS NFR BLD AUTO: 60.9 % — SIGNIFICANT CHANGE UP (ref 43–77)
NEUTROPHILS NFR BLD AUTO: 63.1 %
NITRITE URINE: POSITIVE
NONHDLC SERPL-MCNC: 120 MG/DL
NRBC # BLD: 0 /100 WBCS — SIGNIFICANT CHANGE UP (ref 0–0)
NT-PROBNP SERPL-MCNC: 2974 PG/ML
PH URINE: 6.5
PLATELET # BLD AUTO: 167 K/UL
PLATELET # BLD AUTO: 189 K/UL — SIGNIFICANT CHANGE UP (ref 150–400)
POTASSIUM SERPL-MCNC: 4 MMOL/L — SIGNIFICANT CHANGE UP (ref 3.5–5.3)
POTASSIUM SERPL-SCNC: 4 MMOL/L — SIGNIFICANT CHANGE UP (ref 3.5–5.3)
POTASSIUM SERPL-SCNC: 5.1 MMOL/L
PROT SERPL-MCNC: 7.4 G/DL — SIGNIFICANT CHANGE UP (ref 6–8.3)
PROTEIN URINE: 100 MG/DL
RBC # BLD: 2.8 M/UL — LOW (ref 4.2–5.8)
RBC # BLD: 3 M/UL
RBC # FLD: 17.5 % — HIGH (ref 10.3–14.5)
RBC # FLD: 18.4 %
SODIUM SERPL-SCNC: 139 MMOL/L — SIGNIFICANT CHANGE UP (ref 135–145)
SODIUM SERPL-SCNC: 141 MMOL/L
SPECIFIC GRAVITY URINE: 1.02
TRIGL SERPL-MCNC: 304 MG/DL
URATE SERPL-MCNC: 6.6 MG/DL
UROBILINOGEN URINE: 0.2 MG/DL
WBC # BLD: 5.65 K/UL — SIGNIFICANT CHANGE UP (ref 3.8–10.5)
WBC # FLD AUTO: 5.65 K/UL — SIGNIFICANT CHANGE UP (ref 3.8–10.5)
WBC # FLD AUTO: 6.33 K/UL

## 2024-07-08 RX ORDER — AMOXICILLIN AND CLAVULANATE POTASSIUM 875; 125 MG/1; 1/1
875-125 TABLET, FILM COATED ORAL
Refills: 0 | Status: ACTIVE | COMMUNITY

## 2024-07-09 ENCOUNTER — APPOINTMENT (OUTPATIENT)
Dept: INFUSION THERAPY | Facility: HOSPITAL | Age: 84
End: 2024-07-09

## 2024-07-10 ENCOUNTER — APPOINTMENT (OUTPATIENT)
Dept: INFUSION THERAPY | Facility: HOSPITAL | Age: 84
End: 2024-07-10

## 2024-07-11 ENCOUNTER — APPOINTMENT (OUTPATIENT)
Dept: INFUSION THERAPY | Facility: HOSPITAL | Age: 84
End: 2024-07-11

## 2024-07-11 ENCOUNTER — RESULT REVIEW (OUTPATIENT)
Age: 84
End: 2024-07-11

## 2024-07-11 ENCOUNTER — APPOINTMENT (OUTPATIENT)
Dept: HEMATOLOGY ONCOLOGY | Facility: CLINIC | Age: 84
End: 2024-07-11

## 2024-07-11 LAB
BASOPHILS # BLD AUTO: 0.08 K/UL — SIGNIFICANT CHANGE UP (ref 0–0.2)
BASOPHILS NFR BLD AUTO: 1.1 % — SIGNIFICANT CHANGE UP (ref 0–2)
EOSINOPHIL # BLD AUTO: 0.13 K/UL — SIGNIFICANT CHANGE UP (ref 0–0.5)
EOSINOPHIL NFR BLD AUTO: 1.8 % — SIGNIFICANT CHANGE UP (ref 0–6)
HCT VFR BLD CALC: 29.2 % — LOW (ref 39–50)
HGB BLD-MCNC: 10.2 G/DL — LOW (ref 13–17)
IMM GRANULOCYTES NFR BLD AUTO: 2.9 % — HIGH (ref 0–0.9)
LYMPHOCYTES # BLD AUTO: 1.22 K/UL — SIGNIFICANT CHANGE UP (ref 1–3.3)
LYMPHOCYTES # BLD AUTO: 16.8 % — SIGNIFICANT CHANGE UP (ref 13–44)
MCHC RBC-ENTMCNC: 34.9 G/DL — SIGNIFICANT CHANGE UP (ref 32–36)
MCHC RBC-ENTMCNC: 39.4 PG — HIGH (ref 27–34)
MCV RBC AUTO: 112.7 FL — HIGH (ref 80–100)
MONOCYTES # BLD AUTO: 0.38 K/UL — SIGNIFICANT CHANGE UP (ref 0–0.9)
MONOCYTES NFR BLD AUTO: 5.2 % — SIGNIFICANT CHANGE UP (ref 2–14)
NEUTROPHILS # BLD AUTO: 5.26 K/UL — SIGNIFICANT CHANGE UP (ref 1.8–7.4)
NEUTROPHILS NFR BLD AUTO: 72.2 % — SIGNIFICANT CHANGE UP (ref 43–77)
NRBC # BLD: 0 /100 WBCS — SIGNIFICANT CHANGE UP (ref 0–0)
PLATELET # BLD AUTO: 178 K/UL — SIGNIFICANT CHANGE UP (ref 150–400)
RBC # BLD: 2.59 M/UL — LOW (ref 4.2–5.8)
RBC # FLD: 17.4 % — HIGH (ref 10.3–14.5)
WBC # BLD: 7.28 K/UL — SIGNIFICANT CHANGE UP (ref 3.8–10.5)
WBC # FLD AUTO: 7.28 K/UL — SIGNIFICANT CHANGE UP (ref 3.8–10.5)

## 2024-07-12 ENCOUNTER — RESULT REVIEW (OUTPATIENT)
Age: 84
End: 2024-07-12

## 2024-07-12 ENCOUNTER — APPOINTMENT (OUTPATIENT)
Dept: INFUSION THERAPY | Facility: HOSPITAL | Age: 84
End: 2024-07-12

## 2024-07-12 LAB
ALBUMIN SERPL ELPH-MCNC: 4.2 G/DL — SIGNIFICANT CHANGE UP (ref 3.3–5)
ALP SERPL-CCNC: 88 U/L — SIGNIFICANT CHANGE UP (ref 40–120)
ALT FLD-CCNC: 7 U/L — LOW (ref 10–45)
ANION GAP SERPL CALC-SCNC: 12 MMOL/L — SIGNIFICANT CHANGE UP (ref 5–17)
AST SERPL-CCNC: 21 U/L — SIGNIFICANT CHANGE UP (ref 10–40)
BASOPHILS # BLD AUTO: 0.09 K/UL — SIGNIFICANT CHANGE UP (ref 0–0.2)
BASOPHILS NFR BLD AUTO: 1.2 % — SIGNIFICANT CHANGE UP (ref 0–2)
BILIRUB SERPL-MCNC: 0.7 MG/DL — SIGNIFICANT CHANGE UP (ref 0.2–1.2)
BUN SERPL-MCNC: 28 MG/DL — HIGH (ref 7–23)
CALCIUM SERPL-MCNC: 9.2 MG/DL — SIGNIFICANT CHANGE UP (ref 8.4–10.5)
CHLORIDE SERPL-SCNC: 101 MMOL/L — SIGNIFICANT CHANGE UP (ref 96–108)
CO2 SERPL-SCNC: 25 MMOL/L — SIGNIFICANT CHANGE UP (ref 22–31)
CREAT SERPL-MCNC: 1.87 MG/DL — HIGH (ref 0.5–1.3)
EGFR: 35 ML/MIN/1.73M2 — LOW
EOSINOPHIL # BLD AUTO: 0.13 K/UL — SIGNIFICANT CHANGE UP (ref 0–0.5)
EOSINOPHIL NFR BLD AUTO: 1.8 % — SIGNIFICANT CHANGE UP (ref 0–6)
GLUCOSE SERPL-MCNC: 111 MG/DL — HIGH (ref 70–99)
HCT VFR BLD CALC: 29.4 % — LOW (ref 39–50)
HGB BLD-MCNC: 10.1 G/DL — LOW (ref 13–17)
IMM GRANULOCYTES NFR BLD AUTO: 3.5 % — HIGH (ref 0–0.9)
LYMPHOCYTES # BLD AUTO: 1.18 K/UL — SIGNIFICANT CHANGE UP (ref 1–3.3)
LYMPHOCYTES # BLD AUTO: 16.3 % — SIGNIFICANT CHANGE UP (ref 13–44)
MCHC RBC-ENTMCNC: 34.4 G/DL — SIGNIFICANT CHANGE UP (ref 32–36)
MCHC RBC-ENTMCNC: 38.8 PG — HIGH (ref 27–34)
MCV RBC AUTO: 113.1 FL — HIGH (ref 80–100)
MONOCYTES # BLD AUTO: 0.36 K/UL — SIGNIFICANT CHANGE UP (ref 0–0.9)
MONOCYTES NFR BLD AUTO: 5 % — SIGNIFICANT CHANGE UP (ref 2–14)
NEUTROPHILS # BLD AUTO: 5.21 K/UL — SIGNIFICANT CHANGE UP (ref 1.8–7.4)
NEUTROPHILS NFR BLD AUTO: 72.2 % — SIGNIFICANT CHANGE UP (ref 43–77)
NRBC # BLD: 0 /100 WBCS — SIGNIFICANT CHANGE UP (ref 0–0)
PLATELET # BLD AUTO: 181 K/UL — SIGNIFICANT CHANGE UP (ref 150–400)
POTASSIUM SERPL-MCNC: 4 MMOL/L — SIGNIFICANT CHANGE UP (ref 3.5–5.3)
POTASSIUM SERPL-SCNC: 4 MMOL/L — SIGNIFICANT CHANGE UP (ref 3.5–5.3)
PROT SERPL-MCNC: 7.5 G/DL — SIGNIFICANT CHANGE UP (ref 6–8.3)
RBC # BLD: 2.6 M/UL — LOW (ref 4.2–5.8)
RBC # FLD: 17.6 % — HIGH (ref 10.3–14.5)
SODIUM SERPL-SCNC: 139 MMOL/L — SIGNIFICANT CHANGE UP (ref 135–145)
WBC # BLD: 7.22 K/UL — SIGNIFICANT CHANGE UP (ref 3.8–10.5)
WBC # FLD AUTO: 7.22 K/UL — SIGNIFICANT CHANGE UP (ref 3.8–10.5)

## 2024-07-26 ENCOUNTER — OUTPATIENT (OUTPATIENT)
Dept: OUTPATIENT SERVICES | Facility: HOSPITAL | Age: 84
LOS: 1 days | Discharge: HOME | End: 2024-07-26

## 2024-07-26 DIAGNOSIS — D64.9 ANEMIA, UNSPECIFIED: ICD-10-CM

## 2024-07-26 DIAGNOSIS — Z98.49 CATARACT EXTRACTION STATUS, UNSPECIFIED EYE: Chronic | ICD-10-CM

## 2024-07-26 DIAGNOSIS — D46.9 MYELODYSPLASTIC SYNDROME, UNSPECIFIED: ICD-10-CM

## 2024-07-26 DIAGNOSIS — Z90.79 ACQUIRED ABSENCE OF OTHER GENITAL ORGAN(S): Chronic | ICD-10-CM

## 2024-07-29 ENCOUNTER — RX RENEWAL (OUTPATIENT)
Age: 84
End: 2024-07-29

## 2024-08-02 ENCOUNTER — RESULT REVIEW (OUTPATIENT)
Age: 84
End: 2024-08-02

## 2024-08-02 ENCOUNTER — APPOINTMENT (OUTPATIENT)
Dept: HEMATOLOGY ONCOLOGY | Facility: CLINIC | Age: 84
End: 2024-08-02
Payer: MEDICARE

## 2024-08-02 VITALS
WEIGHT: 178.99 LBS | RESPIRATION RATE: 16 BRPM | DIASTOLIC BLOOD PRESSURE: 73 MMHG | SYSTOLIC BLOOD PRESSURE: 144 MMHG | HEART RATE: 80 BPM | BODY MASS INDEX: 28.04 KG/M2 | OXYGEN SATURATION: 99 % | TEMPERATURE: 98.1 F

## 2024-08-02 DIAGNOSIS — Z51.11 ENCOUNTER FOR ANTINEOPLASTIC CHEMOTHERAPY: ICD-10-CM

## 2024-08-02 DIAGNOSIS — D64.9 ANEMIA, UNSPECIFIED: ICD-10-CM

## 2024-08-02 DIAGNOSIS — N18.32 CHRONIC KIDNEY DISEASE, STAGE 3B: ICD-10-CM

## 2024-08-02 DIAGNOSIS — D61.818 OTHER PANCYTOPENIA: ICD-10-CM

## 2024-08-02 DIAGNOSIS — D53.9 NUTRITIONAL ANEMIA, UNSPECIFIED: ICD-10-CM

## 2024-08-02 LAB
ANISOCYTOSIS BLD QL: SIGNIFICANT CHANGE UP
BASOPHILS # BLD AUTO: 0.03 K/UL — SIGNIFICANT CHANGE UP (ref 0–0.2)
BASOPHILS NFR BLD AUTO: 1.7 % — SIGNIFICANT CHANGE UP (ref 0–2)
DACRYOCYTES BLD QL SMEAR: SLIGHT — SIGNIFICANT CHANGE UP
ELLIPTOCYTES BLD QL SMEAR: SLIGHT — SIGNIFICANT CHANGE UP
EOSINOPHIL # BLD AUTO: 0.02 K/UL — SIGNIFICANT CHANGE UP (ref 0–0.5)
EOSINOPHIL NFR BLD AUTO: 1.1 % — SIGNIFICANT CHANGE UP (ref 0–6)
HCT VFR BLD CALC: 29 % — LOW (ref 39–50)
HGB BLD-MCNC: 9.7 G/DL — LOW (ref 13–17)
IMM GRANULOCYTES NFR BLD AUTO: 1.7 % — HIGH (ref 0–0.9)
LYMPHOCYTES # BLD AUTO: 0.72 K/UL — LOW (ref 1–3.3)
LYMPHOCYTES # BLD AUTO: 39.8 % — SIGNIFICANT CHANGE UP (ref 13–44)
MACROCYTES BLD QL: SIGNIFICANT CHANGE UP
MCHC RBC-ENTMCNC: 33.4 G/DL — SIGNIFICANT CHANGE UP (ref 32–36)
MCHC RBC-ENTMCNC: 39.3 PG — HIGH (ref 27–34)
MCV RBC AUTO: 117.4 FL — HIGH (ref 80–100)
MONOCYTES # BLD AUTO: 0.09 K/UL — SIGNIFICANT CHANGE UP (ref 0–0.9)
MONOCYTES NFR BLD AUTO: 5 % — SIGNIFICANT CHANGE UP (ref 2–14)
NEUTROPHILS # BLD AUTO: 0.92 K/UL — LOW (ref 1.8–7.4)
NEUTROPHILS NFR BLD AUTO: 50.7 % — SIGNIFICANT CHANGE UP (ref 43–77)
NRBC # BLD: 0 /100 WBCS — SIGNIFICANT CHANGE UP (ref 0–0)
PLAT MORPH BLD: NORMAL — SIGNIFICANT CHANGE UP
PLATELET # BLD AUTO: 233 K/UL — SIGNIFICANT CHANGE UP (ref 150–400)
POIKILOCYTOSIS BLD QL AUTO: SLIGHT — SIGNIFICANT CHANGE UP
RBC # BLD: 2.47 M/UL — LOW (ref 4.2–5.8)
RBC # FLD: 19.6 % — HIGH (ref 10.3–14.5)
RBC BLD AUTO: ABNORMAL
WBC # BLD: 1.81 K/UL — LOW (ref 3.8–10.5)
WBC # FLD AUTO: 1.81 K/UL — LOW (ref 3.8–10.5)

## 2024-08-02 PROCEDURE — 99214 OFFICE O/P EST MOD 30 MIN: CPT

## 2024-08-02 NOTE — RESULTS/DATA
[FreeTextEntry1] : //2/2024  HGB 9.7 WBC 1.81    6/28/2024 Labs from today pending; Hb 10.3->11.6 on June 7th and 10th. While hospitalized, Hb ~11 with MCV in 110s  MRCP IMPRESSION (06/2024): No choledocholithiasis.  Cholelithiasis with nonspecific edematous thickening of the gallbladder wall.  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 Follow-up MR/MRCP of contrast in 2 years is  recommended to assess for stability.    5/30/2024 WBC 2.62 Hgb 10.3 Platelet 156k  4/46/2024 Most recent labs from 4/22/2024 HGB 10.5 <10.2 <...11.1 WBC 4.26  < 180   CMP form  WNL except Cr 1.73   4/8/2024 WBC 3.69 Hgb 11.4 Platelet 301 Normal LDH CMP w/ Cr 1.9, eGFR 35   3/13/2024 WBC 3.05 Hgb 11.6 Platelets 265 Normal LDH and UA Cr 1.99, eGFR 33  2/12/2024  HGB 11.3   WBC 3.19  BONE MARROW  	 Patient:     MASOOD MACARIO   Accession:                             [id number]  Collected Date/Time:                   2/2/2024 11:08 EST Received Date/Time:                    2/3/2024 11:15 EST  Hematopathology Report - Auth (Verified)  Specimen(s) Submitted 1. Bone marrow biopsy MH 2. Bone marrow aspirate for Flow OP  Final Diagnosis 1, 2. Bone marrow biopsy and bone marrow aspirate      - Cellular bone marrow with   erythroid predominant  trilineage  hematopoiesis, reduced  myelopoiesis and  adequate  megakaryopoiesis See note and description.  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  Current biopsy shows cellular bone marrow with    erythroid predominant  trilineage  hematopoiesis, reduced  myelopoiesis and adequate  megakaryopoiesis  with few small  hypolobated forms. Aspirate smears show  dysplastic  features in erythroid  elements  including nuclear irregularities, budding and   megloblastoid  changes.  Suggest correlation with pending   cytogenetics , FISH and myeloid  NGS panel. Comprehensive report with results of pending ancillary studies to follow.  Ancillary studies Bone marrow aspirate iron stain:   Iron stores are increased; ring  sideroblasts are not increased. Flow  cytometry ([id number]):     -  The lymphocyte   immunophenotypic findings show no diagnostic abnormalities.     -  Myeloblasts (0.65% of cells), positive for   myeloperoxidase , HLA-DR, CD34, , CD33, CD13, partial dim CD7; negative for CD15, CD16, CD64, CD2, CD4.      - The  myeloid immunophenotypic  findings show normal  myeloid granularity, no increase in   myeloid immaturity, and normal  myeloid antigen maturation pattern, and the presence of    hematogones (which are reported to be low in   myelodysplasia). Immunohistochemical  stains:  Immunostains CD34, ,  Myeloperoxidase , CD15, CD71, E- cadherin , CD61, Factor VIII, p53 are performed on block 1A. CD34 stains less than 1% cells.  stains increased scattered mast cells.  Myeloperoxidase , CD15 highlight  myeloid elements. CD71 stains many clusters of   erythroid elements and show  erythroid predominance. E- cadherin  stains few pronormoblasts . CD61, factor VIII stains  megakaryocytes  and highlight small  hypolobated forms. p53 (dim) stains few scattered cells. Cytogenetics : Pending FISH:  Pending  Microscopic description: 1. Biopsy:  Sections of bone marrow biopsy and bone marrow fragments in clot show variable  cellularity  (5% -50% cellularity ) in a fragmented and hemorrhagic core biopsy,   erythroid predominant  trilineage hematopoiesis with maturation, reduced   myelopoiesis. M:E ratio is reduced.  Megakaryocytes  are normal in number with occasional small  hypolobated forms. Iron stores are increased.  2. Aspirate:  Spicular  and cellular; adequate for interpretation. Maturing and mature  myeloid and  erythroid elements are present.  Erythroid  elements are markedly increased.   Erythroid elements show progressive maturation with  dysplastic features including nuclear irregularities, budding and  megaloblastoid changes. M:E ratio (0.3:1) is reduced.    Megakaryocytes appear normal in number and occasional small forms are present. Many scattered mast cells are present.  	 Flow Cytometry Final Report ________________________________________________________________________ Specimen: Bone marrow aspirate Date Collected: 02/02/2024 Date Received: 02/02/2024 Date Processed: 02/02/2024 Date Reported: 02/07/2024 16:45 Accession #: [id number] ________________________________________________________________________ CLINICAL DATA: Clinical history of myelodysplastic syndrome, rule out acute myeloid leukemia  ________________________________________________________________________ CD45/Side Scatter Differential Granulocytes: 45 % ;    Lymphocytes: 29 % ;    Monocytes: 4 % ;    Dim CD45 and/or blast gate: 5 % ;    Erythroid/debris: 15 % ________________________________________________________________________ DIAGNOSIS: Bone marrow aspirate:     -  The lymphocyte immunophenotypic findings show no diagnostic abnormalities.     - Myeloblasts (0.65% of cells), positive for myeloperoxidase, HLA-DR, CD34, , CD33, CD13, partial dim CD7; negative for CD15, CD16, CD64, CD2, CD4.      - The myeloid immunophenotypic findings show normal myeloid granularity, no increase in myeloid immaturity, and normal myeloid antigen maturation pattern, and the presence of hematogones (which are reported to be low in myelodysplasia). Please see interpretation.  INTERPRETATION: MORPHOLOGY: Maturing trilineage hematopoiesis CYTOSPIN: Maturing and mature myeloid elements with no significant lymphocytosis or immaturity; pronormoblasts present.  IMMUNOPHENOTYPE: Lymphocytes (29% of cells): Heterogeneous population of T-cells (with normal CD4 to CD8 ratio, including CD57+ natural killer-like T-cells, gamma/delta T-cells), natural killer cells, and polytypic B-cells. The lymphocyte immunophenotypic findings show no diagnostic abnormalities.  Myeloblasts (0.65% of cells), positive for myeloperoxidase, HLA-DR, CD34, , CD33, CD13, partial dim, CD7; negative for CD15, CD16, CD64, CD2, CD4.  CD45/side scatter shows 0.65% myeloblast population and normal myeloid granularity. There is no increase in CD34, CD14/CD64 or  positive cells. Myeloid antigen pattern is normal with CD13, CD16, CD11b, CD15, and CD33. Granulocytes express CD56.  Hematogones are present. The myeloid immunophenotypic findings show normal myeloid granularity, no increase in myeloid immaturity, and normal myeloid antigen maturation pattern, and the presence of hematogones (which are reported to be low in myelodysplasia). _____________________________________________________________________  PATHOLOGY: MOLECULAR   [employer] Advanced NGS Myeloid Panel Final Report  RESULT SUMMARY: ABNORMAL  DETECTED GENOMIC ALTERATIONS:   Tier I: Variants of Strong Clinical Significance   SF3B1 p.[id number]   Tier II: Variants of Potential Clinical Significance   DNMT3A p.?   Tier III: Variants of Unknown Clinical Significance   KIT p.[id number]   12/7/2023 HGB 10.4 WBC 3.47    9/18/2023  (Before and after stopping Revlimid)  WBC 1.14 > 6.08 Hgb 7.3  > 8.8  > 210  8/9/2023 Most recent blood work from 8/9/2023 WBC 1.14  Hgb 7.3     7/2/2023 Hgb 9.9, .4 WBC platelet normal  Cr 2.3 eGFR 28  Hypokalemic on 6/28 3.3  5/22/2023 WBC and platelet WNL Hgb 11.1  .9 CMP from 5/4-  Cr 1.79, eGFR 38 Pending CMP LDH UA today  3/24/2023 WBC 6.28 Hgb 10.2 .7 Platelet 224 Last Cr (2/24/23) - 2.42 Pending repeat CBC, CMP, LDH, UA  2/24/2023 WBC 2.62 Hgb 8.8 Platelet 128 Pending CMP, LDH, UA  1/26/2023 Last Hgb 9.1, Cr 2.3 (12/16/2022)    12/16/2022 HGB on 10/31/2022; 8.3,  Bone marrow biopsy:  Patient:   MASOOD MACARIO   Accession:                             [id number]  Collected Date/Time:                   10/31/2022 16:49 EDT Received Date/Time:                    10/31/2022 16:50 EDT  Hematopathology Addendum Report - Auth (Verified)  Hematopathology Addendum Additional immunohistochemical stains (block 1A: p53, ) show  increased  positive interstitial mast cells without aggregates. TP53  shows less than 1% bright positive cells.  There is no change in the diagnosis.  Verified by: Brooklynn Erazo (Electronic Signature) Reported on: 11/17/22 09:24 [facility], [facility], [address] [place] [address] Phone: [phone]   Fax: [phone] _________________________________________________________________  Disclaimer Slide(s) with built in immunohistochemical study control(s) and negative  control associated with this case has/have been verified by the sign out  pathologist.  For slide(s) without built in controls positive control slides has/have  been reviewed and approved by immunohistochemistry lab  These immunohistochemical/ in-situ hybridization tests have been developed  and their performance characteristics determined by [facility], Department of Pathology, Division of Immunopathology,  [phone] [place] [id number].  It has not been cleared  or approved by the U.S. Food and Drug Administration.  The FDA has  determined that such clearance or approval is not necessary.  This test  is used for clinical purposes.  The laboratory is certified under the  CLIA-88 as qualified to perform high complexity clinical testing. Hematopathology Addendum Report - Auth (Verified)  Hematopathology Addendum       Comprehensive Hematopathology Report  Final Diagnosis : 1, 2. 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 The bone marrow  shows hypercellularity with erythroid hyperplasia and increased ring  sideroblasts and dysplastic megakaryocytosis. Interstitial mast cells  are increased with normal morphology, few CD25 positive, negative CD30.  The findings show myelodysplastic syndrome with multilineage dysplasia  (hypolobulated megakaryocytes) and ring sideroblasts. Correlation with  cytogenetics, FISH, and somatic mutation analysis to evaluate for complex  karyotype, del(5q), -7, del(7q) SF3B1, TP53, other abnormalities is  done. Please note findings of an   abnormal male karyotype, 46,XY,del(5)(q15q33) [13]/46,XY[7],  a positive MDS FISH panel and [employer] Advanced NGS  Myeloid Report showing   Tier I: Variants of Strong Clinical Significance:  SF3B1 p.Rxs259Cbg (VAF: 39%),   Tier II: Variants of Potential Clinical   Significance: DNMT3A p.?  (VAF: 4%), Tier III: Variants of Unknown  Clinical Significance:   KIT p.Lhf382Wak  (VAF: 50%) . Based on the  additional findings, the MDS classification (ICC) is MDS with del(5q) and  with WHO is MDS with low blasts and 5q deletion.  Dr. Mcmahon was notified of the diagnosis on 11/07/2022.  Morphology: Microscopic description: 1. Biopsy:   Sections of bone marrow biopsy and bone marrow fragments in  clot show hypercellularity (50-85% cellularity), erythroid predominance  with maturation and increased pronormoblasts, maturing and mature  myeloid elements, megakaryocytes at least normal in number with abnormal  morphology (increased hypolobulated/small forms), increased interstitial  mast cells without aggregates, and iron stores increased.  2. Aspirate:   Cellular spicules are present, adequate for interpretation.   Maturing and mature myeloid and erythroid elements are present with  erythroid predominance (M:E ratio (1.16:1).  Megakaryocytes appear at  least normal in number with small/hypolobulated morphology. Mast cells  are increased in the spicules (round and normally granular).  Bone Marrow Aspirate Differential: (100 Cells). Type  % Normal* Blast  0% 0-3 Neutrophil and    Precursors   47% 33-63 Eosinophil  3% 1-5 Basophil  0% 0-1 Pronormoblast  5% 0-2 Normoblast  43% 15-25 Monocyte  0% 0-2 Lymphocyte  7% 10-15 Plasma cell  0% 0-1   *Adult Range  Comment Iron stain (examined to evaluate for iron stores; see microscopic  description) and Giemsa stain (shows appropriate staining pattern) are  performed and evaluated on block(s): 1A, 1B  Ancillary Studies: Bone marrow aspirate iron stain:   Iron stores are increased; numerous ring  sideroblasts are present (greater than 15%).  Flow cytometry:   The myeloid immunophenotypic findings show decreased  myeloid granularity, no increase in myeloid immaturity (myeloblasts,  0.32% of cells, with normal immunophenotype), normal myeloid antigen  maturation pattern, few mast cells, and the presence of hematogones  (which are reported to be low in myelodysplasia). The lymphocyte immunophenotypic findings show no diagnostic abnormalities.  Immunohistochemical stains   (block 1A: CD34, , myeloperoxidase,  CD71, E-cadherin, factor VIII, CD15, CD61, CD25, CD30, p53) show no  increase in CD34 positive cells (less than 3%), erythroid predominance  (positive with CD71), with clusters of pronormoblasts (positive with  E-cadherin); diminished myeloid elements with maturation (positive   with myeloperoxidase and CD15), and increased megakaryocyte number with  dysplastic, small/hypolobulated morphology (positive with factor VIII,  CD61). CD30 is negative in mast cells; a few show dim CD25 positivity.  Cytogenetics:  [id number] Date Collected:  10/31/2022 Result:  Abnormal male karyotype Karyotype: 46,XY,del(5)(q15q33)[13]/46,XY[7]  Fluorescence in situ Hybridization (FISH):    [id number] Result: ABNORMAL FISH MDS PANEL - 5q deletion detected (65%) Probe(s) and Location(s):   N6H274/ D5S23 (5p15.2), EGR1 (5q31), D7Z1  (7p11.1-q11.1), V2S606 (7q31), CEP-8/D8Z2   ? (8p11.1-q11.1), W05I014  (20q12) ISCN Nomenclature:  nuc clif(T9P121/A0Z88f8,EGR1x1)[130/200],  (D7Z1,Z5O635)x2[200], (D8Z2,G31T261)x2[197/200], (TP53x2)[197/200]  Molecular Analyses: [employer] Advanced NGS Myeloid Report Specimen ID:  [id number] Date Collected:  10/31/2022 Specimen Source:  Bone Marrow  RESULT SUMMARY:  ABNORMAL DETECTED GENOMIC ALTERATIONS: Tier I: Variants of Strong Clinical Significance SF3B1 p.Kzz576Ioo Tier II: Variants of Potential Clinical Significance DNMT3A p.? Tier III: Variants of Unknown Clinical Significance KIT p.Nmw907Abn  PERTINENT NEGATIVE RESULTS: The following genes are NEGATIVE for clinically relevant mutations.  Mutational hotspots and surrounding exonic regions were interrogated for  DNA level point mutations and indels (fusions not assayed). ABL1, ANKRD26, ASXL1, ATRX, BCOR, BCORL1, BRAF, CALR, CBL, CCND2, CDKN2A,  CEBPA, CSF3R, CUX1, DDX41, ETNK1, ETV6, EZH2, FBXW7, FLT3, GATA2, HRAS,  IDH1, IDH2, JAK2, KDM6A, KMT2A, KRAS, MAP2K1, MPL, MYD88, NF1, NPM1,  NRAS, PDGFRA, PHF6, PTEN, PTPN11, RUNX1, SETBP1, SRSF2, STAG2, TET2,  TP53, U2AF1, WT1, ZRSR2 TECHNICAL SUMMARY : DNMT3A p.? c.2174-1G>A 4% allele frequency Exon 19 [id number]  SF3B1 p.Zbo907Icn c.1998G>C 39% allele frequency Exon 14 [id number]   KIT p.Bfo846Yzv c.1879C>T 50% allele frequency Exon 12 [id number]  Clinical History/Data  : History of macrocytic anemia with CKD rule out primary bone marrow  disorder  CBC, 10/31/2022  Test Code       Result         Reference                  Range WBC             5.75 K/uL      3.80 - 10.50 RBC             2.34 M/uL L    4.20 - 5.80 HGB             8.3 g/dL L     13.0 - 17.0 HCT             25.4 % L       39.0 - 50.0 MCV             108.5 fl H     80.0 - 100.0 MCH             35.5 pg H      27.0 - 34.0 MCHC            32.7 g/dL      32.0 - 36.0 RDW             18.5 % H       10.3 - 14.5 PLT             236 K/uL       150 - 400 Auto NRBC       0 /100 WBCs    0 - 0 NEUT%           63.5 %         43.0 - 77.0  NEUT#           3.65 K/uL      1.80 - 7.40 LYMPH%          20.5 %         13.0 - 44.0 LYMPH#          1.18 K/uL      1.00 - 3.30 MONO%           7.0 %          2.0 - 14.0 MONO#           0.40 K/uL      0.00 - 0.90 EOS%            3.3 %          0.0 - 6.0 EOS#            0.19 K/uL      0.00 - 0.50 BASO%           1.4 %          0.0 - 2.0 BASO#           0.08 K/uL      0.00 - 0.20 BUN             28 mg/dL H     7 - 23 Creatinine      2.32 mg/dL H   0.50 - 1.30  Verified by: Brooklynn Erazo (Electronic Signature) Reported on: 11/09/22 16:40 [facility], [facility], [address] [place] [address] Phone: [phone]   Fax: [phone]  10/27/2022 (Labs on 10/18/2022): Hgb stable at 9.0, normal WBC, platelets Creatinine 2.32 eGFR 28   5/12/2022 Available labs reviewed.  Macrocytic anemia, no etiology could be determined.

## 2024-08-02 NOTE — ASSESSMENT
[FreeTextEntry1] : 84 y M seen in follow up for macrocytic anemia (initial evaluation: HGB ~9.5, MCV ~125), WBC and platelet counts are preserved. Patient now has some symptoms of anemia. 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 He was started on Revlimid in December but was unable to tolerate due to multiple side effects. Recent hospitalization for new onset afib + anemia. Course c/b acute gout flare, neutropenic fever, transaminitis, and JUAN RAMON on CKD. Since his discharge from the hospital, he has had two vagal episodes with afib RVR which is now being controlled by 25mg Metoprolol BID. Clinically, he is responding well to the Procrit injections with his Hgb ~10 and appears with more energy and strength. Restarted Revlimid 5mg daily but discontinued on 8/10/2023, and started on HMA. Weekly CBC and CMP to monitor his blood counts as well as liver/kidney function. Procrit injections discontinued before C7 treatment.      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 along with primary bone marrow disorder  - Bone marrow biopsy: MDS with 5q deletion (65% cells) and MDS-RS with SF3B1 (39% allele frequency)  - Discussed and reviewed bone marrow findings with the patient  - Started on Revlimid 5 mg daily (recommended dose 10 mg) and held in January 2023 due to cytopenias req hospitalization  - Started 20,000U Procrit injections since February with great response (Hgb 10+)  - Restarted Revlimid 5 mg daily (June 2023), tolerating well but cytopenia worsened  - Increase Procrit to 40,000 units QW (8/10/2023); hold for Hgb > 11  - Held Revlimid as of 8/10/2023 for worsening pancytopenia  - Cytopenia including HGB improved  - Discussed and planned to start HMA (S/Q)  - Patient consented (9/18/2023)  - C1D1 on 10/1/2023, C2 D1 on 10/30/2023  - Admitted to hospital for sepsis, Cycle 3 delayed  - Last transfusion was in 11/2023  - Post C4 bone marrow: Discussed; response noted; Tri-lineage hematopoiesis, no increase in blasts  - Transfuse for Hgb < 8 (if symptomatic) and platelets < 20k  - So far has not required transfusions since starting Dacogen tx  - Has not required Procrit since C7  - C/w trending CBC, CMP, LDH weekly home draws (order in)  - Proceed C11 treatment with Decitabine starting 8/5/2024 - Resume PPX antibacterial and antifungal  [] Afib, rate controlled, resolved  - New onset Afib  - Likely multifactorial etiology of dehydration and anemia  - [id number] score = 5  - On 6.25mg of Carvedilol  - Cardiology held ASA and Plavix due to anemia  - Evaluated by cards and started on Metoprolol  - Now afib rate controlled  - F/u w/ cards   [ ] Gout, resolved  - C/w Allopurinol and Colchicine  - Right toe without S&S of infection  - Continue Allopurinol  - Continue f/u with rheum   [ ] Back pain  - Last MRI in 2021 with lumbar spondylosis, with spinal canal narrowing  - Left flank pain may be r/t the stenosis causing pinched nerve pains  - Currently on Gabapentin 200mg daily  - Increased to Gabapentin 300mg TID  - Repeat MRI with no contrast (6/20) - chronic lumbar spondylosis  - Recommended spine specialist - pt deferred at this time and will f/u w/ rheum   RTC in 4 weeks before C12 treatment

## 2024-08-02 NOTE — PHYSICAL EXAM
[Ambulatory and capable of all self care but unable to carry out any work activities] : Status 2- Ambulatory and capable of all self care but unable to carry out any work activities. Up and about more than 50% of waking hours [Normal] : affect appropriate [de-identified] : seen in wheelchair [de-identified] : Mild conjunctival pallor.  [de-identified] : afib rate controlled  [de-identified] : unsteady gait, 2 ppl assist  [de-identified] : R medial component of ankle blister - wrapped and without purulent drainage.

## 2024-08-02 NOTE — HISTORY OF PRESENT ILLNESS
[Treatment Protocol] : Treatment Protocol [de-identified] : CHART REVIEW: 81-year-old Mr. MACARIO is seen in consult for macrocytic anemia (Hgb 9.5, MCV ~125). Patient has multiple medical conditions including stage-3 CKD. He has no symptoms of anemia and has no complaints.  [FreeTextEntry1] : Dacogen q28 days. C1: 10/2/23 - 10/6/23. C2: 10/30/23 - 11/3/23. C3:12/11/23 - 12/15/23 (delayed; cancelled). C4: 1/8/24-1/12/24. C5: 2/12/24-2/16/24. C6: 3/11/23-3/15/24. C7: 4/8/24-4/12/24. C8: 5/6-5/10. [de-identified] : 05/12/22: 81 year-old Mr. MACARIO is seen in consult for macrocytic  anemia (Hgb 9.5, MCV ~125). Patient has multiple medical conditions including stage-3 CKD. He has no symptoms of anemia and has no complaints.   10/27/22: Patient presenting today for follow up for macrocytic anemia. Overall feels well. Endorses some fatigue and shortness of breath with activity. Denies headache, dizziness, Ambulates with walker. He is being followed by his nephrologist who manages his hypertension. Endorses taking diuretics every other day.    12/16/2022 Patient returns for a follow up. He endorses no change in his health status. 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 Patient has some symptoms of fatigue and tiredness.   1/26/2023 Patient returns for a follow up. He started taking Revlimid on 12/30/2022. About 2 weeks later he started developing side effects like-- loss  of appetite, body ache, constipation, pins and needle sensation. He also appears to have developed Jaundice.   2/24/2023 Patient seen in follow up, accompanied by Preeti ceja, at bedside. He had a recent hospitalization from 1/27 - 2/7 for new onset afib that was detected in the treatment room prior to his blood transfusion appointment. He was found to be in afib, anemic (Hgb 6.3) requiring 4 PRBCs throughout his stay. His course was c/b acute gout flare, neutropenic fever, transaminitis, and JUAN RAMON on CKD. 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 Had an episode of neutropenic fever started on Cefepime but stopped as per ID, most likely due to gout flare. UA + but asymptomatic, not treated. Bld cx neg x2. Syncopal episode while being on the commode yesterday 2/24. As per Preeti, EKG revealed rate controlled afib. Today, his HR was 127 in office. Denies dizziness, SOB, chest pain, palpitations. As per pt, was on a holter monitor that was submitted yesterday. He is f/u with Dr. Reddy (cards). He also have been having a dry cough for few days. + chills. No fever, body aches, night sweats. No sick contacts. Working with PT. Still feels weak, ambulating via wheelchair. Appetite is okay. Weight is stable.   3/24/2023 Patient seen in follow up. Accompanied by Preeti ceja at bedside. He has been responding well to 20k U Procrit weekly injections. Last Hgb 10.2, BP stable 111/58. Since his last visit, has been evaluated by cards for his afib (now rate controlled) currently on Metoprolol 25mg BID. He reports feeling much better since last encounter - he is now able to ambulate longer distances with a walker. Endorses unsteady gait (vertigo). Appetite is good, weight is stable.   5/22/2023 Patient seen in follow up. Accompanied by aide and daughter at bedside. Had a recent UTI, treated with Amoxicillin. He'll be following up with his PCP tomorrow. He endorses left flank pain that started after his UTI that is hindering him from ambulating. Describes the pain to be "shocking" and sharp in nature, pain reproduced by movements. His last MRI was in 2021 which revealed lumbar spondylosis with mild to moderate spinal canal narrowing. + peripheral neuropathy of his right lower ext. Currently on Gabapentin 200mg daily. His Hgb has been stable since April 27th. Last Hgb 11.1 last week. Otherwise, no complaints. Appetite is good and weight is stable.  7/5/2023 Patient seen in follow up. With aide and daughter at bedside. Interim, he's been feeling well. He completed first month of starting Revlimid without any side effects. His most recent CBC from last week 9.7, he plans on self administering Procrit today after the visit. He had MRI done 6/20 which showed degenerative vs inflammation of his lumbar spine. He continues to experience back pain and some scapular pain from pulled muscle last week. Recommended ortho / pain management for chronic back pain but pt denied at this time. He reports feeling weaker in his lower extremities but does not want to participate in PT at this time. Otherwise, feeling well. Appetite is good, weight is stable.   8/10/2023 Patient presents for a follow up. He appears pale. He feels weak and sleepy after taking the Lenalidomide pill. He is not doing any PT. He is not able to walk by himself yet. Hs anemia has become worse despite EUGENIA. Lenalidomide may not be helping to improve his disease but worsening the pancytopenia.    9/18/2023 Patient seen in follow up. He was taken off Revlimid in his last visit and his EUGENIA dose was increased to 40KU. His HGB increased to ~9g (from 7) and neutropenia and thrombocytopenia resolved. He looks and feels better. We discussed starting HMA.   12/7/2023 Patient presents for a follow up and evaluation for treatment. He completed 2 cycles of HMA (Dacogen). Before the 3rd cycle, he was admitted to the hospital for low O2Sat. He received IV abx for suspected sepsis. He was discharged a week ago. His physical condition has improved. His last transfusion (PRBC) was 3 weeks ago (11/2023). His Hgb is 10.4, platelets 175, WBC 3.47. He is continuing on Retacrit at home. He is holding his counts without transfusions. It appears that he is responding to the treatment.   3/11/2024 Patient seen the tx room for a follow up appt. Today is C6D1. He is accompanied by Preeti, his aide and his son at bedside. Interim, he's been in good health. He is now able to climb up the stairs. He is slowly regaining his strength. He has been giving himself the Procrit despite his Hgb being > 11, we had a lengthy discussion again today going over the parameters for Procrit injections (ONLY ADMINISTER WHEN HGB < 11). The patient, as well as his aide and his son verbalized full understanding  4/8/2024 Patient seen in the tx room for a f/u. Today is C7D1 of Dacogen. He is accompanied by Preeti, his aide. Interim, he's been in good health. He has stopped his Procrit since his Hgb has been > 11. He reports that his legs feel weak and cannot climb the stairs well without falling. He plans to resume PT to regain his strength.  4/26/2024  Patient presents for a follow up for MDS on HMA. He also has CHK (eGFR 39) He has been on Decitabine monthly. He responded well to the treatment. He became transfusion independent, his counts normalized, He is not on EUGENIA or GCSF or TPO-RA. He slowly gained strength and now able to ambulate with a walker. He is going for a vascular surgery evaluation for possible stent in the LE vessels. He has no complaints.   5/29/2024 Patient seen in follow up. Accompanied by his HHA. Interim, he's been in good health. Tolerated his C8 with no issues. Counts have been stable, receiving Procrit for Hgb <11  6/28/2024  Patient seen in follow up for MDS, accompanied by HHA. Recently hospitalized 6/11 for sepsis with Stenotrophomonas, MRSE bacteremia requiring PICC line be pulled and replaced. He completed a course of bactrim as of the June 27th. MRCP completed and significant for choledocholithiasis and 3mm pancreatic cyst, no inpatient intervention required. He endorses 1 R foot blister evaluated by wound care on regular basis without purulent drainage. Denies fever, chills, cough, dyspnea. Still maintains adequate physical status - ambulates with assistance of walker.    8/2/2024 Patient returns for a follow up. He has completed 10 cycles of HMA (Decitabine) and seems to be holding response. He has not needed any transfusion since C2 Tx. He has also taken off EUGENIA at C5. His HGB harbors at 10.x,, normal WBC and platelet count. However, his today's counts have dropped. The patient however has recurrent UTI. He just finished a course of ABX and now having symptoms again.     *

## 2024-08-05 ENCOUNTER — RESULT REVIEW (OUTPATIENT)
Age: 84
End: 2024-08-05

## 2024-08-05 ENCOUNTER — APPOINTMENT (OUTPATIENT)
Dept: INFUSION THERAPY | Facility: HOSPITAL | Age: 84
End: 2024-08-05

## 2024-08-05 ENCOUNTER — APPOINTMENT (OUTPATIENT)
Dept: HEMATOLOGY ONCOLOGY | Facility: CLINIC | Age: 84
End: 2024-08-05

## 2024-08-05 LAB
ALBUMIN SERPL ELPH-MCNC: 4 G/DL — SIGNIFICANT CHANGE UP (ref 3.3–5)
ALP SERPL-CCNC: 90 U/L — SIGNIFICANT CHANGE UP (ref 40–120)
ALT FLD-CCNC: 14 U/L — SIGNIFICANT CHANGE UP (ref 10–45)
ANION GAP SERPL CALC-SCNC: 14 MMOL/L — SIGNIFICANT CHANGE UP (ref 5–17)
AST SERPL-CCNC: 20 U/L — SIGNIFICANT CHANGE UP (ref 10–40)
BASOPHILS # BLD AUTO: 0.1 K/UL — SIGNIFICANT CHANGE UP (ref 0–0.2)
BASOPHILS NFR BLD AUTO: 3.1 % — HIGH (ref 0–2)
BILIRUB SERPL-MCNC: 0.5 MG/DL — SIGNIFICANT CHANGE UP (ref 0.2–1.2)
BUN SERPL-MCNC: 36 MG/DL — HIGH (ref 7–23)
CALCIUM SERPL-MCNC: 9.2 MG/DL — SIGNIFICANT CHANGE UP (ref 8.4–10.5)
CHLORIDE SERPL-SCNC: 102 MMOL/L — SIGNIFICANT CHANGE UP (ref 96–108)
CO2 SERPL-SCNC: 27 MMOL/L — SIGNIFICANT CHANGE UP (ref 22–31)
CREAT SERPL-MCNC: 2.11 MG/DL — HIGH (ref 0.5–1.3)
EGFR: 30 ML/MIN/1.73M2 — LOW
EOSINOPHIL # BLD AUTO: 0.07 K/UL — SIGNIFICANT CHANGE UP (ref 0–0.5)
EOSINOPHIL NFR BLD AUTO: 2.2 % — SIGNIFICANT CHANGE UP (ref 0–6)
GLUCOSE SERPL-MCNC: 98 MG/DL — SIGNIFICANT CHANGE UP (ref 70–99)
HCT VFR BLD CALC: 28.3 % — LOW (ref 39–50)
HGB BLD-MCNC: 9.8 G/DL — LOW (ref 13–17)
IMM GRANULOCYTES NFR BLD AUTO: 3.8 % — HIGH (ref 0–0.9)
LDH SERPL L TO P-CCNC: 218 U/L — SIGNIFICANT CHANGE UP (ref 50–242)
LYMPHOCYTES # BLD AUTO: 1.33 K/UL — SIGNIFICANT CHANGE UP (ref 1–3.3)
LYMPHOCYTES # BLD AUTO: 41.6 % — SIGNIFICANT CHANGE UP (ref 13–44)
MCHC RBC-ENTMCNC: 34.6 G/DL — SIGNIFICANT CHANGE UP (ref 32–36)
MCHC RBC-ENTMCNC: 38.6 PG — HIGH (ref 27–34)
MCV RBC AUTO: 111.4 FL — HIGH (ref 80–100)
MONOCYTES # BLD AUTO: 0.29 K/UL — SIGNIFICANT CHANGE UP (ref 0–0.9)
MONOCYTES NFR BLD AUTO: 9.1 % — SIGNIFICANT CHANGE UP (ref 2–14)
NEUTROPHILS # BLD AUTO: 1.29 K/UL — LOW (ref 1.8–7.4)
NEUTROPHILS NFR BLD AUTO: 40.2 % — LOW (ref 43–77)
NRBC # BLD: 0 /100 WBCS — SIGNIFICANT CHANGE UP (ref 0–0)
PLATELET # BLD AUTO: 293 K/UL — SIGNIFICANT CHANGE UP (ref 150–400)
POTASSIUM SERPL-MCNC: 3.9 MMOL/L — SIGNIFICANT CHANGE UP (ref 3.5–5.3)
POTASSIUM SERPL-SCNC: 3.9 MMOL/L — SIGNIFICANT CHANGE UP (ref 3.5–5.3)
PROT SERPL-MCNC: 7.4 G/DL — SIGNIFICANT CHANGE UP (ref 6–8.3)
RBC # BLD: 2.54 M/UL — LOW (ref 4.2–5.8)
RBC # FLD: 18.7 % — HIGH (ref 10.3–14.5)
SODIUM SERPL-SCNC: 142 MMOL/L — SIGNIFICANT CHANGE UP (ref 135–145)
WBC # BLD: 3.2 K/UL — LOW (ref 3.8–10.5)
WBC # FLD AUTO: 3.2 K/UL — LOW (ref 3.8–10.5)

## 2024-08-06 ENCOUNTER — APPOINTMENT (OUTPATIENT)
Dept: INFUSION THERAPY | Facility: HOSPITAL | Age: 84
End: 2024-08-06

## 2024-08-06 DIAGNOSIS — R11.2 NAUSEA WITH VOMITING, UNSPECIFIED: ICD-10-CM

## 2024-08-06 DIAGNOSIS — Z51.11 ENCOUNTER FOR ANTINEOPLASTIC CHEMOTHERAPY: ICD-10-CM

## 2024-08-07 ENCOUNTER — APPOINTMENT (OUTPATIENT)
Dept: INFUSION THERAPY | Facility: HOSPITAL | Age: 84
End: 2024-08-07

## 2024-08-08 ENCOUNTER — RESULT REVIEW (OUTPATIENT)
Age: 84
End: 2024-08-08

## 2024-08-08 ENCOUNTER — APPOINTMENT (OUTPATIENT)
Dept: HEMATOLOGY ONCOLOGY | Facility: CLINIC | Age: 84
End: 2024-08-08

## 2024-08-08 ENCOUNTER — APPOINTMENT (OUTPATIENT)
Dept: INFUSION THERAPY | Facility: HOSPITAL | Age: 84
End: 2024-08-08

## 2024-08-08 LAB
ANISOCYTOSIS BLD QL: SLIGHT — SIGNIFICANT CHANGE UP
BASOPHILS # BLD AUTO: 0 K/UL — SIGNIFICANT CHANGE UP (ref 0–0.2)
BASOPHILS NFR BLD AUTO: 0 % — SIGNIFICANT CHANGE UP (ref 0–2)
DACRYOCYTES BLD QL SMEAR: SLIGHT — SIGNIFICANT CHANGE UP
EOSINOPHIL # BLD AUTO: 0.08 K/UL — SIGNIFICANT CHANGE UP (ref 0–0.5)
EOSINOPHIL NFR BLD AUTO: 2 % — SIGNIFICANT CHANGE UP (ref 0–6)
HCT VFR BLD CALC: 28.8 % — LOW (ref 39–50)
HGB BLD-MCNC: 10 G/DL — LOW (ref 13–17)
LYMPHOCYTES # BLD AUTO: 1.47 K/UL — SIGNIFICANT CHANGE UP (ref 1–3.3)
LYMPHOCYTES # BLD AUTO: 36 % — SIGNIFICANT CHANGE UP (ref 13–44)
MCHC RBC-ENTMCNC: 34.7 G/DL — SIGNIFICANT CHANGE UP (ref 32–36)
MCHC RBC-ENTMCNC: 38.6 PG — HIGH (ref 27–34)
MCV RBC AUTO: 111.2 FL — HIGH (ref 80–100)
METAMYELOCYTES # FLD: 1 % — HIGH (ref 0–0)
MONOCYTES # BLD AUTO: 0.12 K/UL — SIGNIFICANT CHANGE UP (ref 0–0.9)
MONOCYTES NFR BLD AUTO: 3 % — SIGNIFICANT CHANGE UP (ref 2–14)
MYELOCYTES NFR BLD: 2 % — HIGH (ref 0–0)
NEUTROPHILS # BLD AUTO: 2.29 K/UL — SIGNIFICANT CHANGE UP (ref 1.8–7.4)
NEUTROPHILS NFR BLD AUTO: 56 % — SIGNIFICANT CHANGE UP (ref 43–77)
NRBC # BLD: 0 /100 WBCS — SIGNIFICANT CHANGE UP (ref 0–0)
NRBC # BLD: SIGNIFICANT CHANGE UP /100 WBCS (ref 0–0)
PLAT MORPH BLD: NORMAL — SIGNIFICANT CHANGE UP
PLATELET # BLD AUTO: 283 K/UL — SIGNIFICANT CHANGE UP (ref 150–400)
POIKILOCYTOSIS BLD QL AUTO: SLIGHT — SIGNIFICANT CHANGE UP
POLYCHROMASIA BLD QL SMEAR: SLIGHT — SIGNIFICANT CHANGE UP
RBC # BLD: 2.59 M/UL — LOW (ref 4.2–5.8)
RBC # FLD: 18.2 % — HIGH (ref 10.3–14.5)
RBC BLD AUTO: ABNORMAL
WBC # BLD: 4.09 K/UL — SIGNIFICANT CHANGE UP (ref 3.8–10.5)
WBC # FLD AUTO: 4.09 K/UL — SIGNIFICANT CHANGE UP (ref 3.8–10.5)

## 2024-08-09 ENCOUNTER — RESULT REVIEW (OUTPATIENT)
Age: 84
End: 2024-08-09

## 2024-08-09 ENCOUNTER — APPOINTMENT (OUTPATIENT)
Dept: INFUSION THERAPY | Facility: HOSPITAL | Age: 84
End: 2024-08-09

## 2024-08-09 LAB
ALBUMIN SERPL ELPH-MCNC: 4 G/DL — SIGNIFICANT CHANGE UP (ref 3.3–5)
ALP SERPL-CCNC: 83 U/L — SIGNIFICANT CHANGE UP (ref 40–120)
ALT FLD-CCNC: 11 U/L — SIGNIFICANT CHANGE UP (ref 10–45)
ANION GAP SERPL CALC-SCNC: 14 MMOL/L — SIGNIFICANT CHANGE UP (ref 5–17)
AST SERPL-CCNC: 21 U/L — SIGNIFICANT CHANGE UP (ref 10–40)
BASOPHILS # BLD AUTO: 0.1 K/UL — SIGNIFICANT CHANGE UP (ref 0–0.2)
BASOPHILS NFR BLD AUTO: 2.6 % — HIGH (ref 0–2)
BILIRUB SERPL-MCNC: 0.4 MG/DL — SIGNIFICANT CHANGE UP (ref 0.2–1.2)
BUN SERPL-MCNC: 34 MG/DL — HIGH (ref 7–23)
CALCIUM SERPL-MCNC: 8.9 MG/DL — SIGNIFICANT CHANGE UP (ref 8.4–10.5)
CHLORIDE SERPL-SCNC: 100 MMOL/L — SIGNIFICANT CHANGE UP (ref 96–108)
CO2 SERPL-SCNC: 25 MMOL/L — SIGNIFICANT CHANGE UP (ref 22–31)
CREAT SERPL-MCNC: 2.06 MG/DL — HIGH (ref 0.5–1.3)
EGFR: 31 ML/MIN/1.73M2 — LOW
EOSINOPHIL # BLD AUTO: 0.13 K/UL — SIGNIFICANT CHANGE UP (ref 0–0.5)
EOSINOPHIL NFR BLD AUTO: 3.4 % — SIGNIFICANT CHANGE UP (ref 0–6)
GLUCOSE SERPL-MCNC: 168 MG/DL — HIGH (ref 70–99)
HCT VFR BLD CALC: 27.9 % — LOW (ref 39–50)
HGB BLD-MCNC: 9.5 G/DL — LOW (ref 13–17)
IMM GRANULOCYTES NFR BLD AUTO: 4.7 % — HIGH (ref 0–0.9)
LYMPHOCYTES # BLD AUTO: 1.04 K/UL — SIGNIFICANT CHANGE UP (ref 1–3.3)
LYMPHOCYTES # BLD AUTO: 27.2 % — SIGNIFICANT CHANGE UP (ref 13–44)
MCHC RBC-ENTMCNC: 34.1 G/DL — SIGNIFICANT CHANGE UP (ref 32–36)
MCHC RBC-ENTMCNC: 38.8 PG — HIGH (ref 27–34)
MCV RBC AUTO: 113.9 FL — HIGH (ref 80–100)
MONOCYTES # BLD AUTO: 0.22 K/UL — SIGNIFICANT CHANGE UP (ref 0–0.9)
MONOCYTES NFR BLD AUTO: 5.8 % — SIGNIFICANT CHANGE UP (ref 2–14)
NEUTROPHILS # BLD AUTO: 2.15 K/UL — SIGNIFICANT CHANGE UP (ref 1.8–7.4)
NEUTROPHILS NFR BLD AUTO: 56.3 % — SIGNIFICANT CHANGE UP (ref 43–77)
NRBC # BLD: 0 /100 WBCS — SIGNIFICANT CHANGE UP (ref 0–0)
PLATELET # BLD AUTO: 272 K/UL — SIGNIFICANT CHANGE UP (ref 150–400)
POTASSIUM SERPL-MCNC: 4.1 MMOL/L — SIGNIFICANT CHANGE UP (ref 3.5–5.3)
POTASSIUM SERPL-SCNC: 4.1 MMOL/L — SIGNIFICANT CHANGE UP (ref 3.5–5.3)
PROT SERPL-MCNC: 7 G/DL — SIGNIFICANT CHANGE UP (ref 6–8.3)
RBC # BLD: 2.45 M/UL — LOW (ref 4.2–5.8)
RBC # FLD: 18.6 % — HIGH (ref 10.3–14.5)
SODIUM SERPL-SCNC: 139 MMOL/L — SIGNIFICANT CHANGE UP (ref 135–145)
WBC # BLD: 3.82 K/UL — SIGNIFICANT CHANGE UP (ref 3.8–10.5)
WBC # FLD AUTO: 3.82 K/UL — SIGNIFICANT CHANGE UP (ref 3.8–10.5)

## 2024-08-10 ENCOUNTER — APPOINTMENT (OUTPATIENT)
Dept: INFUSION THERAPY | Facility: HOSPITAL | Age: 84
End: 2024-08-10

## 2024-08-20 ENCOUNTER — LABORATORY RESULT (OUTPATIENT)
Age: 84
End: 2024-08-20

## 2024-08-23 ENCOUNTER — APPOINTMENT (OUTPATIENT)
Dept: INFECTIOUS DISEASE | Facility: CLINIC | Age: 84
End: 2024-08-23
Payer: MEDICARE

## 2024-08-23 VITALS
OXYGEN SATURATION: 97 % | HEART RATE: 67 BPM | BODY MASS INDEX: 28.25 KG/M2 | DIASTOLIC BLOOD PRESSURE: 80 MMHG | SYSTOLIC BLOOD PRESSURE: 135 MMHG | HEIGHT: 67 IN | WEIGHT: 180 LBS | TEMPERATURE: 97.8 F

## 2024-08-23 DIAGNOSIS — K59.00 CONSTIPATION, UNSPECIFIED: ICD-10-CM

## 2024-08-23 DIAGNOSIS — R23.8 OTHER SKIN CHANGES: ICD-10-CM

## 2024-08-23 PROCEDURE — 99213 OFFICE O/P EST LOW 20 MIN: CPT

## 2024-08-23 RX ORDER — DOXYCYCLINE HYCLATE 100 MG/1
100 CAPSULE ORAL
Qty: 14 | Refills: 0 | Status: ACTIVE | COMMUNITY
Start: 2024-08-23 | End: 1900-01-01

## 2024-08-23 NOTE — PHYSICAL EXAM
[General Appearance - Alert] : alert [General Appearance - In No Acute Distress] : in no acute distress [General Appearance - Well Nourished] : well nourished [General Appearance - Well-Appearing] : healthy appearing [Sclera] : the sclera and conjunctiva were normal [Extraocular Movements] : extraocular movements were intact [Outer Ear] : the ears and nose were normal in appearance [Examination Of The Oral Cavity] : the lips and gums were normal [Oropharynx] : the oropharynx was normal with no thrush [Neck Appearance] : the appearance of the neck was normal [Neck Cervical Mass (___cm)] : no neck mass was observed [] : no respiratory distress [Respiration, Rhythm And Depth] : normal respiratory rhythm and effort [Exaggerated Use Of Accessory Muscles For Inspiration] : no accessory muscle use [Auscultation Breath Sounds / Voice Sounds] : lungs were clear to auscultation bilaterally [Heart Rate And Rhythm] : heart rate was normal and rhythm regular [Heart Sounds] : normal S1 and S2 [Heart Sounds Gallop] : no gallops [Murmurs] : no murmurs [Heart Sounds Pericardial Friction Rub] : no pericardial rub [Bowel Sounds] : normal bowel sounds [Abdomen Soft] : soft [No Palpable Adenopathy] : no palpable adenopathy [Musculoskeletal - Swelling] : no joint swelling [Oriented To Time, Place, And Person] : oriented to person, place, and time [Affect] : the affect was normal [FreeTextEntry1] : Awake, alert

## 2024-08-23 NOTE — REVIEW OF SYSTEMS
[Constipation] : constipation [Negative] : Heme/Lymph [Fever] : no fever [Chills] : no chills [Difficulty Sleeping] : no difficulty sleeping [Feeling Sick] : not feeling sick [Eyesight Problems] : no eyesight problems [Sore Throat] : no sore throat [Chest Pain] : no chest pain [Palpitations] : no palpitations [Shortness Of Breath] : no shortness of breath [Cough] : no cough [SOB on Exertion] : no shortness of breath during exertion [Sputum] : not coughing up ~M sputum [Abdominal Pain] : no abdominal pain [Vomiting] : no vomiting [Diarrhea] : no diarrhea [Dysuria] : no dysuria [Joint Pain] : no joint pain [Skin Lesions] : no skin lesions [Skin Wound] : no skin wound [Itching] : no itching [Confused] : no confusion

## 2024-08-23 NOTE — HISTORY OF PRESENT ILLNESS
[FreeTextEntry1] : 83-yo M w/ PMH of MDS on decitabine via R PICC and CAD, admitted at [facility] (6/11-19) w/ sepsis from Stenotrophomonas maltophilia and MRSE bacteremia. T 102.6 and HR 120s on presentation. LFT elevation. MRCP revealing choledocholithiasis. Findings concerning for 3-mm pancreatic cystic lesion. Started on Bactrim IV and minocycline, later minocycline d/c'ed (6/11-13). PICC removed 6/12. Repeat BCx 6/13 and 6/14 NGTD. PICC re-inserted 6/17. Continued on Bactrim DS 1 tab Q12H until 6/27 (2 wks from 1st neg culture).  Currently on prophylactic antimicrobials per Heme/Onc (fluc, levaquin, acyclovir). Patient reported having dysuria for the past several days, but not at this time. PICC line managed at home by home visit nursing. Last dressing change yesterday.  +constipation. No diarrhea. No headache, visual changes, SOB, or cough.

## 2024-08-27 ENCOUNTER — APPOINTMENT (OUTPATIENT)
Dept: HEMATOLOGY ONCOLOGY | Facility: CLINIC | Age: 84
End: 2024-08-27
Payer: MEDICARE

## 2024-08-27 ENCOUNTER — RESULT REVIEW (OUTPATIENT)
Age: 84
End: 2024-08-27

## 2024-08-27 ENCOUNTER — LABORATORY RESULT (OUTPATIENT)
Age: 84
End: 2024-08-27

## 2024-08-27 VITALS
BODY MASS INDEX: 28.73 KG/M2 | SYSTOLIC BLOOD PRESSURE: 136 MMHG | RESPIRATION RATE: 16 BRPM | HEART RATE: 80 BPM | OXYGEN SATURATION: 98 % | DIASTOLIC BLOOD PRESSURE: 77 MMHG | WEIGHT: 183.4 LBS | TEMPERATURE: 98.1 F

## 2024-08-27 DIAGNOSIS — R35.0 FREQUENCY OF MICTURITION: ICD-10-CM

## 2024-08-27 LAB
BASOPHILS # BLD AUTO: 0.01 K/UL — SIGNIFICANT CHANGE UP (ref 0–0.2)
BASOPHILS NFR BLD AUTO: 0.4 % — SIGNIFICANT CHANGE UP (ref 0–2)
EOSINOPHIL # BLD AUTO: 0.08 K/UL — SIGNIFICANT CHANGE UP (ref 0–0.5)
EOSINOPHIL NFR BLD AUTO: 3.4 % — SIGNIFICANT CHANGE UP (ref 0–6)
HCT VFR BLD CALC: 27.6 % — LOW (ref 39–50)
HGB BLD-MCNC: 9.6 G/DL — LOW (ref 13–17)
IMM GRANULOCYTES NFR BLD AUTO: 0.8 % — SIGNIFICANT CHANGE UP (ref 0–0.9)
LYMPHOCYTES # BLD AUTO: 0.97 K/UL — LOW (ref 1–3.3)
LYMPHOCYTES # BLD AUTO: 40.8 % — SIGNIFICANT CHANGE UP (ref 13–44)
MCHC RBC-ENTMCNC: 34.8 G/DL — SIGNIFICANT CHANGE UP (ref 32–36)
MCHC RBC-ENTMCNC: 40.2 PG — HIGH (ref 27–34)
MCV RBC AUTO: 115.5 FL — HIGH (ref 80–100)
MONOCYTES # BLD AUTO: 0.19 K/UL — SIGNIFICANT CHANGE UP (ref 0–0.9)
MONOCYTES NFR BLD AUTO: 8 % — SIGNIFICANT CHANGE UP (ref 2–14)
NEUTROPHILS # BLD AUTO: 1.11 K/UL — LOW (ref 1.8–7.4)
NEUTROPHILS NFR BLD AUTO: 46.6 % — SIGNIFICANT CHANGE UP (ref 43–77)
NRBC # BLD: 0 /100 WBCS — SIGNIFICANT CHANGE UP (ref 0–0)
PLATELET # BLD AUTO: 132 K/UL — LOW (ref 150–400)
RBC # BLD: 2.39 M/UL — LOW (ref 4.2–5.8)
RBC # FLD: 20 % — HIGH (ref 10.3–14.5)
WBC # BLD: 2.38 K/UL — LOW (ref 3.8–10.5)
WBC # FLD AUTO: 2.38 K/UL — LOW (ref 3.8–10.5)

## 2024-08-27 PROCEDURE — 99213 OFFICE O/P EST LOW 20 MIN: CPT

## 2024-08-28 LAB
APPEARANCE: CLEAR
BILIRUBIN URINE: NEGATIVE
BLOOD URINE: NEGATIVE
COLOR: YELLOW
GLUCOSE QUALITATIVE U: NEGATIVE MG/DL
KETONES URINE: NEGATIVE MG/DL
LEUKOCYTE ESTERASE URINE: NEGATIVE
NITRITE URINE: NEGATIVE
PH URINE: 6.5
PROTEIN URINE: 30 MG/DL
SPECIFIC GRAVITY URINE: 1.01
UROBILINOGEN URINE: 0.2 MG/DL

## 2024-08-28 NOTE — RESULTS/DATA
[FreeTextEntry1] : 8/28/2024 WBC 2.38 Hgb 9.6 Platelet 132   //2/2024  HGB 9.7 WBC 1.81    6/28/2024 Labs from today pending; Hb 10.3->11.6 on June 7th and 10th. While hospitalized, Hb ~11 with MCV in 110s  MRCP IMPRESSION (06/2024): No choledocholithiasis.  Cholelithiasis with nonspecific edematous thickening of the gallbladder wall.  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 Follow-up MR/MRCP of contrast in 2 years is  recommended to assess for stability.    5/30/2024 WBC 2.62 Hgb 10.3 Platelet 156k  4/46/2024 Most recent labs from 4/22/2024 HGB 10.5 <10.2 <...11.1 WBC 4.26  < 180   CMP form  WNL except Cr 1.73   4/8/2024 WBC 3.69 Hgb 11.4 Platelet 301 Normal LDH CMP w/ Cr 1.9, eGFR 35   3/13/2024 WBC 3.05 Hgb 11.6 Platelets 265 Normal LDH and UA Cr 1.99, eGFR 33  2/12/2024  HGB 11.3   WBC 3.19  BONE MARROW  	 Patient:     MASOOD MACARIO   Accession:                             [id number]  Collected Date/Time:                   2/2/2024 11:08 EST Received Date/Time:                    2/3/2024 11:15 EST  Hematopathology Report - Auth (Verified)  Specimen(s) Submitted 1. Bone marrow biopsy MH 2. Bone marrow aspirate for Flow OP  Final Diagnosis 1, 2. Bone marrow biopsy and bone marrow aspirate      - Cellular bone marrow with   erythroid predominant  trilineage  hematopoiesis, reduced  myelopoiesis and  adequate  megakaryopoiesis See note and description.  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  Current biopsy shows cellular bone marrow with    erythroid predominant  trilineage  hematopoiesis, reduced  myelopoiesis and adequate  megakaryopoiesis  with few small  hypolobated forms. Aspirate smears show  dysplastic  features in erythroid  elements  including nuclear irregularities, budding and   megloblastoid  changes.  Suggest correlation with pending   cytogenetics , FISH and myeloid  NGS panel. Comprehensive report with results of pending ancillary studies to follow.  Ancillary studies Bone marrow aspirate iron stain:   Iron stores are increased; ring  sideroblasts are not increased. Flow  cytometry ([id number]):     -  The lymphocyte   immunophenotypic findings show no diagnostic abnormalities.     -  Myeloblasts (0.65% of cells), positive for   myeloperoxidase , HLA-DR, CD34, , CD33, CD13, partial dim CD7; negative for CD15, CD16, CD64, CD2, CD4.      - The  myeloid immunophenotypic  findings show normal  myeloid granularity, no increase in   myeloid immaturity, and normal  myeloid antigen maturation pattern, and the presence of    hematogones (which are reported to be low in   myelodysplasia). Immunohistochemical  stains:  Immunostains CD34, ,  Myeloperoxidase , CD15, CD71, E- cadherin , CD61, Factor VIII, p53 are performed on block 1A. CD34 stains less than 1% cells.  stains increased scattered mast cells.  Myeloperoxidase , CD15 highlight  myeloid elements. CD71 stains many clusters of   erythroid elements and show  erythroid predominance. E- cadherin  stains few pronormoblasts . CD61, factor VIII stains  megakaryocytes  and highlight small  hypolobated forms. p53 (dim) stains few scattered cells. Cytogenetics : Pending FISH:  Pending  Microscopic description: 1. Biopsy:  Sections of bone marrow biopsy and bone marrow fragments in clot show variable  cellularity  (5% -50% cellularity ) in a fragmented and hemorrhagic core biopsy,   erythroid predominant  trilineage hematopoiesis with maturation, reduced   myelopoiesis. M:E ratio is reduced.  Megakaryocytes  are normal in number with occasional small  hypolobated forms. Iron stores are increased.  2. Aspirate:  Spicular  and cellular; adequate for interpretation. Maturing and mature  myeloid and  erythroid elements are present.  Erythroid  elements are markedly increased.   Erythroid elements show progressive maturation with  dysplastic features including nuclear irregularities, budding and  megaloblastoid changes. M:E ratio (0.3:1) is reduced.    Megakaryocytes appear normal in number and occasional small forms are present. Many scattered mast cells are present.  	 Flow Cytometry Final Report ________________________________________________________________________ Specimen: Bone marrow aspirate Date Collected: 02/02/2024 Date Received: 02/02/2024 Date Processed: 02/02/2024 Date Reported: 02/07/2024 16:45 Accession #: [id number] ________________________________________________________________________ CLINICAL DATA: Clinical history of myelodysplastic syndrome, rule out acute myeloid leukemia  ________________________________________________________________________ CD45/Side Scatter Differential Granulocytes: 45 % ;    Lymphocytes: 29 % ;    Monocytes: 4 % ;    Dim CD45 and/or blast gate: 5 % ;    Erythroid/debris: 15 % ________________________________________________________________________ DIAGNOSIS: Bone marrow aspirate:     -  The lymphocyte immunophenotypic findings show no diagnostic abnormalities.     - Myeloblasts (0.65% of cells), positive for myeloperoxidase, HLA-DR, CD34, , CD33, CD13, partial dim CD7; negative for CD15, CD16, CD64, CD2, CD4.      - The myeloid immunophenotypic findings show normal myeloid granularity, no increase in myeloid immaturity, and normal myeloid antigen maturation pattern, and the presence of hematogones (which are reported to be low in myelodysplasia). Please see interpretation.  INTERPRETATION: MORPHOLOGY: Maturing trilineage hematopoiesis CYTOSPIN: Maturing and mature myeloid elements with no significant lymphocytosis or immaturity; pronormoblasts present.  IMMUNOPHENOTYPE: Lymphocytes (29% of cells): Heterogeneous population of T-cells (with normal CD4 to CD8 ratio, including CD57+ natural killer-like T-cells, gamma/delta T-cells), natural killer cells, and polytypic B-cells. The lymphocyte immunophenotypic findings show no diagnostic abnormalities.  Myeloblasts (0.65% of cells), positive for myeloperoxidase, HLA-DR, CD34, , CD33, CD13, partial dim, CD7; negative for CD15, CD16, CD64, CD2, CD4.  CD45/side scatter shows 0.65% myeloblast population and normal myeloid granularity. There is no increase in CD34, CD14/CD64 or  positive cells. Myeloid antigen pattern is normal with CD13, CD16, CD11b, CD15, and CD33. Granulocytes express CD56.  Hematogones are present. The myeloid immunophenotypic findings show normal myeloid granularity, no increase in myeloid immaturity, and normal myeloid antigen maturation pattern, and the presence of hematogones (which are reported to be low in myelodysplasia). _____________________________________________________________________  PATHOLOGY: MOLECULAR   [employer] Advanced NGS Myeloid Panel Final Report  RESULT SUMMARY: ABNORMAL  DETECTED GENOMIC ALTERATIONS:   Tier I: Variants of Strong Clinical Significance   SF3B1 p.[id number]   Tier II: Variants of Potential Clinical Significance   DNMT3A p.?   Tier III: Variants of Unknown Clinical Significance   KIT p.[id number]   12/7/2023 HGB 10.4 WBC 3.47    9/18/2023  (Before and after stopping Revlimid)  WBC 1.14 > 6.08 Hgb 7.3  > 8.8  > 210  8/9/2023 Most recent blood work from 8/9/2023 WBC 1.14  Hgb 7.3     7/2/2023 Hgb 9.9, .4 WBC platelet normal  Cr 2.3 eGFR 28  Hypokalemic on 6/28 3.3  5/22/2023 WBC and platelet WNL Hgb 11.1  .9 CMP from 5/4-  Cr 1.79, eGFR 38 Pending CMP LDH UA today  3/24/2023 WBC 6.28 Hgb 10.2 .7 Platelet 224 Last Cr (2/24/23) - 2.42 Pending repeat CBC, CMP, LDH, UA  2/24/2023 WBC 2.62 Hgb 8.8 Platelet 128 Pending CMP, LDH, UA  1/26/2023 Last Hgb 9.1, Cr 2.3 (12/16/2022)    12/16/2022 HGB on 10/31/2022; 8.3,  Bone marrow biopsy:  Patient:   MASOOD MACARIO   Accession:                             [id number]  Collected Date/Time:                   10/31/2022 16:49 EDT Received Date/Time:                    10/31/2022 16:50 EDT  Hematopathology Addendum Report - Auth (Verified)  Hematopathology Addendum Additional immunohistochemical stains (block 1A: p53, ) show  increased  positive interstitial mast cells without aggregates. TP53  shows less than 1% bright positive cells.  There is no change in the diagnosis.  Verified by: Brooklynn Erazo (Electronic Signature) Reported on: 11/17/22 09:24 [facility], [facility], [address] [place] [address] Phone: [phone]   Fax: [phone] _________________________________________________________________  Disclaimer Slide(s) with built in immunohistochemical study control(s) and negative  control associated with this case has/have been verified by the sign out  pathologist.  For slide(s) without built in controls positive control slides has/have  been reviewed and approved by immunohistochemistry lab  These immunohistochemical/ in-situ hybridization tests have been developed  and their performance characteristics determined by [facility], Department of Pathology, Division of Immunopathology,  [address] [place] [address].  It has not been cleared  or approved by the U.S. Food and Drug Administration.  The FDA has  determined that such clearance or approval is not necessary.  This test  is used for clinical purposes.  The laboratory is certified under the  CLIA-88 as qualified to perform high complexity clinical testing. Hematopathology Addendum Report - Auth (Verified)  Hematopathology Addendum       Comprehensive Hematopathology Report  Final Diagnosis : 1, 2. 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 The bone marrow  shows hypercellularity with erythroid hyperplasia and increased ring  sideroblasts and dysplastic megakaryocytosis. Interstitial mast cells  are increased with normal morphology, few CD25 positive, negative CD30.  The findings show myelodysplastic syndrome with multilineage dysplasia  (hypolobulated megakaryocytes) and ring sideroblasts. Correlation with  cytogenetics, FISH, and somatic mutation analysis to evaluate for complex  karyotype, del(5q), -7, del(7q) SF3B1, TP53, other abnormalities is  done. Please note findings of an   abnormal male karyotype, 46,XY,del(5)(q15q33) [13]/46,XY[7],  a positive MDS FISH panel and [employer] Advanced NGS  Myeloid Report showing   Tier I: Variants of Strong Clinical Significance:  SF3B1 p.[id number] (VAF: 39%),   Tier II: Variants of Potential Clinical   Significance: DNMT3A p.?  (VAF: 4%), Tier III: Variants of Unknown  Clinical Significance:   KIT p.Pmo821Hgx  (VAF: 50%) . Based on the  additional findings, the MDS classification (ICC) is MDS with del(5q) and  with WHO is MDS with low blasts and 5q deletion.  Dr. Mcmahon was notified of the diagnosis on 11/07/2022.  Morphology: Microscopic description: 1. Biopsy:   Sections of bone marrow biopsy and bone marrow fragments in  clot show hypercellularity (50-85% cellularity), erythroid predominance  with maturation and increased pronormoblasts, maturing and mature  myeloid elements, megakaryocytes at least normal in number with abnormal  morphology (increased hypolobulated/small forms), increased interstitial  mast cells without aggregates, and iron stores increased.  2. Aspirate:   Cellular spicules are present, adequate for interpretation.   Maturing and mature myeloid and erythroid elements are present with  erythroid predominance (M:E ratio (1.16:1).  Megakaryocytes appear at  least normal in number with small/hypolobulated morphology. Mast cells  are increased in the spicules (round and normally granular).  Bone Marrow Aspirate Differential: (100 Cells). Type  % Normal* Blast  0% 0-3 Neutrophil and    Precursors   47% 33-63 Eosinophil  3% 1-5 Basophil  0% 0-1 Pronormoblast  5% 0-2 Normoblast  43% 15-25 Monocyte  0% 0-2 Lymphocyte  7% 10-15 Plasma cell  0% 0-1   *Adult Range  Comment Iron stain (examined to evaluate for iron stores; see microscopic  description) and Giemsa stain (shows appropriate staining pattern) are  performed and evaluated on block(s): 1A, 1B  Ancillary Studies: Bone marrow aspirate iron stain:   Iron stores are increased; numerous ring  sideroblasts are present (greater than 15%).  Flow cytometry:   The myeloid immunophenotypic findings show decreased  myeloid granularity, no increase in myeloid immaturity (myeloblasts,  0.32% of cells, with normal immunophenotype), normal myeloid antigen  maturation pattern, few mast cells, and the presence of hematogones  (which are reported to be low in myelodysplasia). The lymphocyte immunophenotypic findings show no diagnostic abnormalities.  Immunohistochemical stains   (block 1A: CD34, , myeloperoxidase,  CD71, E-cadherin, factor VIII, CD15, CD61, CD25, CD30, p53) show no  increase in CD34 positive cells (less than 3%), erythroid predominance  (positive with CD71), with clusters of pronormoblasts (positive with  E-cadherin); diminished myeloid elements with maturation (positive   with myeloperoxidase and CD15), and increased megakaryocyte number with  dysplastic, small/hypolobulated morphology (positive with factor VIII,  CD61). CD30 is negative in mast cells; a few show dim CD25 positivity.  Cytogenetics:  [id number] Date Collected:  10/31/2022 Result:  Abnormal male karyotype Karyotype: 46,XY,del(5)(q15q33)[13]/46,XY[7]  Fluorescence in situ Hybridization (FISH):    [id number] Result: ABNORMAL FISH MDS PANEL - 5q deletion detected (65%) Probe(s) and Location(s):   D2M664/ D5S23 (5p15.2), EGR1 (5q31), D7Z1  (7p11.1-q11.1), K9T596 (7q31), CEP-8/D8Z2   ? (8p11.1-q11.1), G02N904  (20q12) ISCN Nomenclature:  nuc clif(H8I419/D1I33z2,EGR1x1)[130/200],  (D7Z1,L5Q382)x2[200], (D8Z2,U20U293)x2[197/200], (TP53x2)[197/200]  Molecular Analyses: [employer] Advanced NGS Myeloid Report Specimen ID:  [id number] Date Collected:  10/31/2022 Specimen Source:  Bone Marrow  RESULT SUMMARY:  ABNORMAL DETECTED GENOMIC ALTERATIONS: Tier I: Variants of Strong Clinical Significance SF3B1 p.[id number] Tier II: Variants of Potential Clinical Significance DNMT3A p.? Tier III: Variants of Unknown Clinical Significance KIT p.[id number]  PERTINENT NEGATIVE RESULTS: The following genes are NEGATIVE for clinically relevant mutations.  Mutational hotspots and surrounding exonic regions were interrogated for  DNA level point mutations and indels (fusions not assayed). ABL1, ANKRD26, ASXL1, ATRX, BCOR, BCORL1, BRAF, CALR, CBL, CCND2, CDKN2A,  CEBPA, CSF3R, CUX1, DDX41, ETNK1, ETV6, EZH2, FBXW7, FLT3, GATA2, HRAS,  IDH1, IDH2, JAK2, KDM6A, KMT2A, KRAS, MAP2K1, MPL, MYD88, NF1, NPM1,  NRAS, PDGFRA, PHF6, PTEN, PTPN11, RUNX1, SETBP1, SRSF2, STAG2, TET2,  TP53, U2AF1, WT1, ZRSR2 TECHNICAL SUMMARY : DNMT3A p.? c.2174-1G>A 4% allele frequency Exon 19 [id number]  SF3B1 p.Fiz123Mdl c.1998G>C 39% allele frequency Exon 14 [id number]_012433.3   KIT p.Twp987Idf c.1879C>T 50% allele frequency Exon 12 [id number]_000222.2  Clinical History/Data  : History of macrocytic anemia with CKD rule out primary bone marrow  disorder  CBC, 10/31/2022  Test Code       Result         Reference                  Range WBC             5.75 K/uL      3.80 - 10.50 RBC             2.34 M/uL L    4.20 - 5.80 HGB             8.3 g/dL L     13.0 - 17.0 HCT             25.4 % L       39.0 - 50.0 MCV             108.5 fl H     80.0 - 100.0 MCH             35.5 pg H      27.0 - 34.0 MCHC            32.7 g/dL      32.0 - 36.0 RDW             18.5 % H       10.3 - 14.5 PLT             236 K/uL       150 - 400 Auto NRBC       0 /100 WBCs    0 - 0 NEUT%           63.5 %         43.0 - 77.0  NEUT#           3.65 K/uL      1.80 - 7.40 LYMPH%          20.5 %         13.0 - 44.0 LYMPH#          1.18 K/uL      1.00 - 3.30 MONO%           7.0 %          2.0 - 14.0 MONO#           0.40 K/uL      0.00 - 0.90 EOS%            3.3 %          0.0 - 6.0 EOS#            0.19 K/uL      0.00 - 0.50 BASO%           1.4 %          0.0 - 2.0 BASO#           0.08 K/uL      0.00 - 0.20 BUN             28 mg/dL H     7 - 23 Creatinine      2.32 mg/dL H   0.50 - 1.30  Verified by: Brooklynn Erazo (Electronic Signature) Reported on: 11/09/22 16:40 [facility], [facility], [address] [place] [address] Phone: [phone]   Fax: [phone]  10/27/2022 (Labs on 10/18/2022): Hgb stable at 9.0, normal WBC, platelets Creatinine 2.32 eGFR 28   5/12/2022 Available labs reviewed.  Macrocytic anemia, no etiology could be determined.

## 2024-08-28 NOTE — HISTORY OF PRESENT ILLNESS
[Treatment Protocol] : Treatment Protocol [de-identified] : CHART REVIEW: 81-year-old Mr. MACARIO is seen in consult for macrocytic anemia (Hgb 9.5, MCV ~125). Patient has multiple medical conditions including stage-3 CKD. He has no symptoms of anemia and has no complaints.  [FreeTextEntry1] : Dacogen q28 days. C1: 10/2/23 - 10/6/23. C2: 10/30/23 - 11/3/23. C3:12/11/23 - 12/15/23 (delayed; cancelled). C4: 1/8/24-1/12/24. C5: 2/12/24-2/16/24. C6: 3/11/23-3/15/24. C7: 4/8/24-4/12/24. C8: 5/6-5/10. C9 (delayed by a month d/t fevers): 7/8-7/12. C10: 8/5-8/9.  [de-identified] : 05/12/22: 81 year-old Mr. MACARIO is seen in consult for macrocytic  anemia (Hgb 9.5, MCV ~125). Patient has multiple medical conditions including stage-3 CKD. He has no symptoms of anemia and has no complaints.   10/27/22: Patient presenting today for follow up for macrocytic anemia. Overall feels well. Endorses some fatigue and shortness of breath with activity. Denies headache, dizziness, Ambulates with walker. He is being followed by his nephrologist who manages his hypertension. Endorses taking diuretics every other day.    12/16/2022 Patient returns for a follow up. He endorses no change in his health status. 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 Patient has some symptoms of fatigue and tiredness.   1/26/2023 Patient returns for a follow up. He started taking Revlimid on 12/30/2022. About 2 weeks later he started developing side effects like-- loss  of appetite, body ache, constipation, pins and needle sensation. He also appears to have developed Jaundice.   2/24/2023 Patient seen in follow up, accompanied by Preeti ceja, at bedside. He had a recent hospitalization from 1/27 - 2/7 for new onset afib that was detected in the treatment room prior to his blood transfusion appointment. He was found to be in afib, anemic (Hgb 6.3) requiring 4 PRBCs throughout his stay. His course was c/b acute gout flare, neutropenic fever, transaminitis, and JUAN RAMON on CKD. 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 Had an episode of neutropenic fever started on Cefepime but stopped as per ID, most likely due to gout flare. UA + but asymptomatic, not treated. Bld cx neg x2. Syncopal episode while being on the commode yesterday 2/24. As per Preeti, EKG revealed rate controlled afib. Today, his HR was 127 in office. Denies dizziness, SOB, chest pain, palpitations. As per pt, was on a holter monitor that was submitted yesterday. He is f/u with Dr. Reddy (cards). He also have been having a dry cough for few days. + chills. No fever, body aches, night sweats. No sick contacts. Working with PT. Still feels weak, ambulating via wheelchair. Appetite is okay. Weight is stable.   3/24/2023 Patient seen in follow up. Accompanied by Preeti ceja at bedside. He has been responding well to 20k U Procrit weekly injections. Last Hgb 10.2, BP stable 111/58. Since his last visit, has been evaluated by cards for his afib (now rate controlled) currently on Metoprolol 25mg BID. He reports feeling much better since last encounter - he is now able to ambulate longer distances with a walker. Endorses unsteady gait (vertigo). Appetite is good, weight is stable.   5/22/2023 Patient seen in follow up. Accompanied by aide and daughter at bedside. Had a recent UTI, treated with Amoxicillin. He'll be following up with his PCP tomorrow. He endorses left flank pain that started after his UTI that is hindering him from ambulating. Describes the pain to be "shocking" and sharp in nature, pain reproduced by movements. His last MRI was in 2021 which revealed lumbar spondylosis with mild to moderate spinal canal narrowing. + peripheral neuropathy of his right lower ext. Currently on Gabapentin 200mg daily. His Hgb has been stable since April 27th. Last Hgb 11.1 last week. Otherwise, no complaints. Appetite is good and weight is stable.  7/5/2023 Patient seen in follow up. With aide and daughter at bedside. Interim, he's been feeling well. He completed first month of starting Revlimid without any side effects. His most recent CBC from last week 9.7, he plans on self administering Procrit today after the visit. He had MRI done 6/20 which showed degenerative vs inflammation of his lumbar spine. He continues to experience back pain and some scapular pain from pulled muscle last week. Recommended ortho / pain management for chronic back pain but pt denied at this time. He reports feeling weaker in his lower extremities but does not want to participate in PT at this time. Otherwise, feeling well. Appetite is good, weight is stable.   8/10/2023 Patient presents for a follow up. He appears pale. He feels weak and sleepy after taking the Lenalidomide pill. He is not doing any PT. He is not able to walk by himself yet. Hs anemia has become worse despite EUGENIA. Lenalidomide may not be helping to improve his disease but worsening the pancytopenia.    9/18/2023 Patient seen in follow up. He was taken off Revlimid in his last visit and his EUGENIA dose was increased to 40KU. His HGB increased to ~9g (from 7) and neutropenia and thrombocytopenia resolved. He looks and feels better. We discussed starting HMA.   12/7/2023 Patient presents for a follow up and evaluation for treatment. He completed 2 cycles of HMA (Dacogen). Before the 3rd cycle, he was admitted to the hospital for low O2Sat. He received IV abx for suspected sepsis. He was discharged a week ago. His physical condition has improved. His last transfusion (PRBC) was 3 weeks ago (11/2023). His Hgb is 10.4, platelets 175, WBC 3.47. He is continuing on Retacrit at home. He is holding his counts without transfusions. It appears that he is responding to the treatment.   3/11/2024 Patient seen the tx room for a follow up appt. Today is C6D1. He is accompanied by Preeti, his aide and his son at bedside. Interim, he's been in good health. He is now able to climb up the stairs. He is slowly regaining his strength. He has been giving himself the Procrit despite his Hgb being > 11, we had a lengthy discussion again today going over the parameters for Procrit injections (ONLY ADMINISTER WHEN HGB < 11). The patient, as well as his aide and his son verbalized full understanding  4/8/2024 Patient seen in the tx room for a f/u. Today is C7D1 of Dacogen. He is accompanied by Preeti, his aide. Interim, he's been in good health. He has stopped his Procrit since his Hgb has been > 11. He reports that his legs feel weak and cannot climb the stairs well without falling. He plans to resume PT to regain his strength.  4/26/2024  Patient presents for a follow up for MDS on HMA. He also has CHK (eGFR 39) He has been on Decitabine monthly. He responded well to the treatment. He became transfusion independent, his counts normalized, He is not on EUGENIA or GCSF or TPO-RA. He slowly gained strength and now able to ambulate with a walker. He is going for a vascular surgery evaluation for possible stent in the LE vessels. He has no complaints.   5/29/2024 Patient seen in follow up. Accompanied by his HHA. Interim, he's been in good health. Tolerated his C8 with no issues. Counts have been stable, receiving Procrit for Hgb <11  6/28/2024  Patient seen in follow up for MDS, accompanied by HHA. Recently hospitalized 6/11 for sepsis with Stenotrophomonas, MRSE bacteremia requiring PICC line be pulled and replaced. He completed a course of bactrim as of the June 27th. MRCP completed and significant for choledocholithiasis and 3mm pancreatic cyst, no inpatient intervention required. He endorses 1 R foot blister evaluated by wound care on regular basis without purulent drainage. Denies fever, chills, cough, dyspnea. Still maintains adequate physical status - ambulates with assistance of walker.    8/2/2024 Patient returns for a follow up. He has completed 10 cycles of HMA (Decitabine) and seems to be holding response. He has not needed any transfusion since C2 Tx. He has also taken off EUGENIA at C5. His HGB harbors at 10.x,, normal WBC and platelet count. However, his today's counts have dropped. The patient however has recurrent UTI. He just finished a course of ABX and now having symptoms again.   8/27/2024 Patient seen in follow up. He completed 10 cycles of Decitabine. Interim, he has been in good health. He is gaining weight. He feels well. Reports difficulty with balance and uses walker to ambulate. He was seen by ID recently and was prescribed Doxy for redness at the insertion site. He denies any pain, swelling of the extremity, pus formation. UTI symptoms resolved.

## 2024-08-28 NOTE — ASSESSMENT
[FreeTextEntry1] : 84 y M seen in follow up for macrocytic anemia (initial evaluation: HGB ~9.5, MCV ~125), WBC and platelet counts are preserved. Patient now has some symptoms of anemia. 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 He was started on Revlimid in December but was unable to tolerate due to multiple side effects. Recent hospitalization for new onset afib + anemia. Course c/b acute gout flare, neutropenic fever, transaminitis, and JUAN RAMON on CKD. Since his discharge from the hospital, he has had two vagal episodes with afib RVR which is now being controlled by 25mg Metoprolol BID. Clinically, he is responding well to the Procrit injections with his Hgb ~10 and appears with more energy and strength. Restarted Revlimid 5mg daily but discontinued on 8/10/2023, and started on HMA. Weekly CBC and CMP to monitor his blood counts as well as liver/kidney function. Procrit injections discontinued before C7 treatment.    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 along with primary bone marrow disorder  - Bone marrow biopsy: MDS with 5q deletion (65% cells) and MDS-RS with SF3B1 (39% allele frequency)  - Discussed and reviewed bone marrow findings with the patient  - Started on Revlimid 5 mg daily (recommended dose 10 mg) and held in January 2023 due to cytopenias req hospitalization  - Started 20,000U Procrit injections since February with great response (Hgb 10+)  - Restarted Revlimid 5 mg daily (June 2023), tolerating well but cytopenia worsened  - Increase Procrit to 40,000 units QW (8/10/2023); hold for Hgb > 11  - Held Revlimid as of 8/10/2023 for worsening pancytopenia  - Cytopenia including HGB improved  - Discussed and planned to start HMA (S/Q)  - Patient consented (9/18/2023)  - C1D1 on 10/1/2023, C2 D1 on 10/30/2023  - Admitted to hospital for sepsis, Cycle 3 delayed  - Last transfusion was in 11/2023  - Post C4 bone marrow: Discussed; response noted; Tri-lineage hematopoiesis, no increase in blasts  - Transfuse for Hgb < 8 (if symptomatic) and platelets < 20k  - So far has not required transfusions since starting Dacogen tx  - Has not required Procrit since C7  - C/w trending CBC, CMP, [facility] weekly home draws (order in)  - Proceed C11 treatment with Decitabine starting 9/5/2024 - Resume PPX antibacterial and antifungal  [] Afib, rate controlled, resolved  - New onset Afib  - Likely multifactorial etiology of dehydration and anemia  - [id number] score = 5  - On 6.25mg of Carvedilol  - Cardiology held ASA and Plavix due to anemia  - Evaluated by cards and started on Metoprolol  - Now afib rate controlled  - F/u w/ cards   [ ] Gout, resolved  - C/w Allopurinol and Colchicine  - Right toe without S&S of infection  - Continue Allopurinol  - Continue f/u with rheum   [ ] Back pain  - Last MRI in 2021 with lumbar spondylosis, with spinal canal narrowing  - Left flank pain may be r/t the stenosis causing pinched nerve pains  - Currently on Gabapentin 200mg daily  - Increased to Gabapentin 300mg TID  - Repeat MRI with no contrast (6/20) - chronic lumbar spondylosis  - Recommended spine specialist - pt deferred at this time and will f/u w/ rheum   RTC a week prior to C12 treatment   Case and management discussed with Dr. Mcmahon  Addendum 8/28/24 Discussed BW results with the pt UA neg. Can discontinue Levo and Fluconazole.  Continue Acyclovir

## 2024-08-28 NOTE — HISTORY OF PRESENT ILLNESS
[Treatment Protocol] : Treatment Protocol [de-identified] : CHART REVIEW: 81-year-old Mr. MACARIO is seen in consult for macrocytic anemia (Hgb 9.5, MCV ~125). Patient has multiple medical conditions including stage-3 CKD. He has no symptoms of anemia and has no complaints.  [FreeTextEntry1] : Dacogen q28 days. C1: 10/2/23 - 10/6/23. C2: 10/30/23 - 11/3/23. C3:12/11/23 - 12/15/23 (delayed; cancelled). C4: 1/8/24-1/12/24. C5: 2/12/24-2/16/24. C6: 3/11/23-3/15/24. C7: 4/8/24-4/12/24. C8: 5/6-5/10. C9 (delayed by a month d/t fevers): 7/8-7/12. C10: 8/5-8/9.  [de-identified] : 05/12/22: 81 year-old Mr. MACARIO is seen in consult for macrocytic  anemia (Hgb 9.5, MCV ~125). Patient has multiple medical conditions including stage-3 CKD. He has no symptoms of anemia and has no complaints.   10/27/22: Patient presenting today for follow up for macrocytic anemia. Overall feels well. Endorses some fatigue and shortness of breath with activity. Denies headache, dizziness, Ambulates with walker. He is being followed by his nephrologist who manages his hypertension. Endorses taking diuretics every other day.    12/16/2022 Patient returns for a follow up. He endorses no change in his health status. 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 Patient has some symptoms of fatigue and tiredness.   1/26/2023 Patient returns for a follow up. He started taking Revlimid on 12/30/2022. About 2 weeks later he started developing side effects like-- loss  of appetite, body ache, constipation, pins and needle sensation. He also appears to have developed Jaundice.   2/24/2023 Patient seen in follow up, accompanied by Preeti ceja, at bedside. He had a recent hospitalization from 1/27 - 2/7 for new onset afib that was detected in the treatment room prior to his blood transfusion appointment. He was found to be in afib, anemic (Hgb 6.3) requiring 4 PRBCs throughout his stay. His course was c/b acute gout flare, neutropenic fever, transaminitis, and JUAN RAMON on CKD. 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 Had an episode of neutropenic fever started on Cefepime but stopped as per ID, most likely due to gout flare. UA + but asymptomatic, not treated. Bld cx neg x2. Syncopal episode while being on the commode yesterday 2/24. As per Preeti, EKG revealed rate controlled afib. Today, his HR was 127 in office. Denies dizziness, SOB, chest pain, palpitations. As per pt, was on a holter monitor that was submitted yesterday. He is f/u with Dr. Reddy (cards). He also have been having a dry cough for few days. + chills. No fever, body aches, night sweats. No sick contacts. Working with PT. Still feels weak, ambulating via wheelchair. Appetite is okay. Weight is stable.   3/24/2023 Patient seen in follow up. Accompanied by Preeti ceja at bedside. He has been responding well to 20k U Procrit weekly injections. Last Hgb 10.2, BP stable 111/58. Since his last visit, has been evaluated by cards for his afib (now rate controlled) currently on Metoprolol 25mg BID. He reports feeling much better since last encounter - he is now able to ambulate longer distances with a walker. Endorses unsteady gait (vertigo). Appetite is good, weight is stable.   5/22/2023 Patient seen in follow up. Accompanied by aide and daughter at bedside. Had a recent UTI, treated with Amoxicillin. He'll be following up with his PCP tomorrow. He endorses left flank pain that started after his UTI that is hindering him from ambulating. Describes the pain to be "shocking" and sharp in nature, pain reproduced by movements. His last MRI was in 2021 which revealed lumbar spondylosis with mild to moderate spinal canal narrowing. + peripheral neuropathy of his right lower ext. Currently on Gabapentin 200mg daily. His Hgb has been stable since April 27th. Last Hgb 11.1 last week. Otherwise, no complaints. Appetite is good and weight is stable.  7/5/2023 Patient seen in follow up. With aide and daughter at bedside. Interim, he's been feeling well. He completed first month of starting Revlimid without any side effects. His most recent CBC from last week 9.7, he plans on self administering Procrit today after the visit. He had MRI done 6/20 which showed degenerative vs inflammation of his lumbar spine. He continues to experience back pain and some scapular pain from pulled muscle last week. Recommended ortho / pain management for chronic back pain but pt denied at this time. He reports feeling weaker in his lower extremities but does not want to participate in PT at this time. Otherwise, feeling well. Appetite is good, weight is stable.   8/10/2023 Patient presents for a follow up. He appears pale. He feels weak and sleepy after taking the Lenalidomide pill. He is not doing any PT. He is not able to walk by himself yet. Hs anemia has become worse despite EUGENIA. Lenalidomide may not be helping to improve his disease but worsening the pancytopenia.    9/18/2023 Patient seen in follow up. He was taken off Revlimid in his last visit and his EUGENIA dose was increased to 40KU. His HGB increased to ~9g (from 7) and neutropenia and thrombocytopenia resolved. He looks and feels better. We discussed starting HMA.   12/7/2023 Patient presents for a follow up and evaluation for treatment. He completed 2 cycles of HMA (Dacogen). Before the 3rd cycle, he was admitted to the hospital for low O2Sat. He received IV abx for suspected sepsis. He was discharged a week ago. His physical condition has improved. His last transfusion (PRBC) was 3 weeks ago (11/2023). His Hgb is 10.4, platelets 175, WBC 3.47. He is continuing on Retacrit at home. He is holding his counts without transfusions. It appears that he is responding to the treatment.   3/11/2024 Patient seen the tx room for a follow up appt. Today is C6D1. He is accompanied by Preeti, his aide and his son at bedside. Interim, he's been in good health. He is now able to climb up the stairs. He is slowly regaining his strength. He has been giving himself the Procrit despite his Hgb being > 11, we had a lengthy discussion again today going over the parameters for Procrit injections (ONLY ADMINISTER WHEN HGB < 11). The patient, as well as his aide and his son verbalized full understanding  4/8/2024 Patient seen in the tx room for a f/u. Today is C7D1 of Dacogen. He is accompanied by Preeti, his aide. Interim, he's been in good health. He has stopped his Procrit since his Hgb has been > 11. He reports that his legs feel weak and cannot climb the stairs well without falling. He plans to resume PT to regain his strength.  4/26/2024  Patient presents for a follow up for MDS on HMA. He also has CHK (eGFR 39) He has been on Decitabine monthly. He responded well to the treatment. He became transfusion independent, his counts normalized, He is not on EUGENIA or GCSF or TPO-RA. He slowly gained strength and now able to ambulate with a walker. He is going for a vascular surgery evaluation for possible stent in the LE vessels. He has no complaints.   5/29/2024 Patient seen in follow up. Accompanied by his HHA. Interim, he's been in good health. Tolerated his C8 with no issues. Counts have been stable, receiving Procrit for Hgb <11  6/28/2024  Patient seen in follow up for MDS, accompanied by HHA. Recently hospitalized 6/11 for sepsis with Stenotrophomonas, MRSE bacteremia requiring PICC line be pulled and replaced. He completed a course of bactrim as of the June 27th. MRCP completed and significant for choledocholithiasis and 3mm pancreatic cyst, no inpatient intervention required. He endorses 1 R foot blister evaluated by wound care on regular basis without purulent drainage. Denies fever, chills, cough, dyspnea. Still maintains adequate physical status - ambulates with assistance of walker.    8/2/2024 Patient returns for a follow up. He has completed 10 cycles of HMA (Decitabine) and seems to be holding response. He has not needed any transfusion since C2 Tx. He has also taken off EUGENIA at C5. His HGB harbors at 10.x,, normal WBC and platelet count. However, his today's counts have dropped. The patient however has recurrent UTI. He just finished a course of ABX and now having symptoms again.   8/27/2024 Patient seen in follow up. He completed 10 cycles of Decitabine. Interim, he has been in good health. He is gaining weight. He feels well. Reports difficulty with balance and uses walker to ambulate. He was seen by ID recently and was prescribed Doxy for redness at the insertion site. He denies any pain, swelling of the extremity, pus formation. UTI symptoms resolved.

## 2024-08-28 NOTE — RESULTS/DATA
[FreeTextEntry1] : 8/28/2024 WBC 2.38 Hgb 9.6 Platelet 132   //2/2024  HGB 9.7 WBC 1.81    6/28/2024 Labs from today pending; Hb 10.3->11.6 on June 7th and 10th. While hospitalized, Hb ~11 with MCV in 110s  MRCP IMPRESSION (06/2024): No choledocholithiasis.  Cholelithiasis with nonspecific edematous thickening of the gallbladder wall.  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 Follow-up MR/MRCP of contrast in 2 years is  recommended to assess for stability.    5/30/2024 WBC 2.62 Hgb 10.3 Platelet 156k  4/46/2024 Most recent labs from 4/22/2024 HGB 10.5 <10.2 <...11.1 WBC 4.26  < 180   CMP form  WNL except Cr 1.73   4/8/2024 WBC 3.69 Hgb 11.4 Platelet 301 Normal LDH CMP w/ Cr 1.9, eGFR 35   3/13/2024 WBC 3.05 Hgb 11.6 Platelets 265 Normal LDH and UA Cr 1.99, eGFR 33  2/12/2024  HGB 11.3   WBC 3.19  BONE MARROW  	 Patient:     MASOOD MACARIO   Accession:                             [id number]  Collected Date/Time:                   2/2/2024 11:08 EST Received Date/Time:                    2/3/2024 11:15 EST  Hematopathology Report - Auth (Verified)  Specimen(s) Submitted 1. Bone marrow biopsy MH 2. Bone marrow aspirate for Flow OP  Final Diagnosis 1, 2. Bone marrow biopsy and bone marrow aspirate      - Cellular bone marrow with   erythroid predominant  trilineage  hematopoiesis, reduced  myelopoiesis and  adequate  megakaryopoiesis See note and description.  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  Current biopsy shows cellular bone marrow with    erythroid predominant  trilineage  hematopoiesis, reduced  myelopoiesis and adequate  megakaryopoiesis  with few small  hypolobated forms. Aspirate smears show  dysplastic  features in erythroid  elements  including nuclear irregularities, budding and   megloblastoid  changes.  Suggest correlation with pending   cytogenetics , FISH and myeloid  NGS panel. Comprehensive report with results of pending ancillary studies to follow.  Ancillary studies Bone marrow aspirate iron stain:   Iron stores are increased; ring  sideroblasts are not increased. Flow  cytometry ([id number]):     -  The lymphocyte   immunophenotypic findings show no diagnostic abnormalities.     -  Myeloblasts (0.65% of cells), positive for   myeloperoxidase , HLA-DR, CD34, , CD33, CD13, partial dim CD7; negative for CD15, CD16, CD64, CD2, CD4.      - The  myeloid immunophenotypic  findings show normal  myeloid granularity, no increase in   myeloid immaturity, and normal  myeloid antigen maturation pattern, and the presence of    hematogones (which are reported to be low in   myelodysplasia). Immunohistochemical  stains:  Immunostains CD34, ,  Myeloperoxidase , CD15, CD71, E- cadherin , CD61, Factor VIII, p53 are performed on block 1A. CD34 stains less than 1% cells.  stains increased scattered mast cells.  Myeloperoxidase , CD15 highlight  myeloid elements. CD71 stains many clusters of   erythroid elements and show  erythroid predominance. E- cadherin  stains few pronormoblasts . CD61, factor VIII stains  megakaryocytes  and highlight small  hypolobated forms. p53 (dim) stains few scattered cells. Cytogenetics : Pending FISH:  Pending  Microscopic description: 1. Biopsy:  Sections of bone marrow biopsy and bone marrow fragments in clot show variable  cellularity  (5% -50% cellularity ) in a fragmented and hemorrhagic core biopsy,   erythroid predominant  trilineage hematopoiesis with maturation, reduced   myelopoiesis. M:E ratio is reduced.  Megakaryocytes  are normal in number with occasional small  hypolobated forms. Iron stores are increased.  2. Aspirate:  Spicular  and cellular; adequate for interpretation. Maturing and mature  myeloid and  erythroid elements are present.  Erythroid  elements are markedly increased.   Erythroid elements show progressive maturation with  dysplastic features including nuclear irregularities, budding and  megaloblastoid changes. M:E ratio (0.3:1) is reduced.    Megakaryocytes appear normal in number and occasional small forms are present. Many scattered mast cells are present.  	 Flow Cytometry Final Report ________________________________________________________________________ Specimen: Bone marrow aspirate Date Collected: 02/02/2024 Date Received: 02/02/2024 Date Processed: 02/02/2024 Date Reported: 02/07/2024 16:45 Accession #: [id number] ________________________________________________________________________ CLINICAL DATA: Clinical history of myelodysplastic syndrome, rule out acute myeloid leukemia  ________________________________________________________________________ CD45/Side Scatter Differential Granulocytes: 45 % ;    Lymphocytes: 29 % ;    Monocytes: 4 % ;    Dim CD45 and/or blast gate: 5 % ;    Erythroid/debris: 15 % ________________________________________________________________________ DIAGNOSIS: Bone marrow aspirate:     -  The lymphocyte immunophenotypic findings show no diagnostic abnormalities.     - Myeloblasts (0.65% of cells), positive for myeloperoxidase, HLA-DR, CD34, , CD33, CD13, partial dim CD7; negative for CD15, CD16, CD64, CD2, CD4.      - The myeloid immunophenotypic findings show normal myeloid granularity, no increase in myeloid immaturity, and normal myeloid antigen maturation pattern, and the presence of hematogones (which are reported to be low in myelodysplasia). Please see interpretation.  INTERPRETATION: MORPHOLOGY: Maturing trilineage hematopoiesis CYTOSPIN: Maturing and mature myeloid elements with no significant lymphocytosis or immaturity; pronormoblasts present.  IMMUNOPHENOTYPE: Lymphocytes (29% of cells): Heterogeneous population of T-cells (with normal CD4 to CD8 ratio, including CD57+ natural killer-like T-cells, gamma/delta T-cells), natural killer cells, and polytypic B-cells. The lymphocyte immunophenotypic findings show no diagnostic abnormalities.  Myeloblasts (0.65% of cells), positive for myeloperoxidase, HLA-DR, CD34, , CD33, CD13, partial dim, CD7; negative for CD15, CD16, CD64, CD2, CD4.  CD45/side scatter shows 0.65% myeloblast population and normal myeloid granularity. There is no increase in CD34, CD14/CD64 or  positive cells. Myeloid antigen pattern is normal with CD13, CD16, CD11b, CD15, and CD33. Granulocytes express CD56.  Hematogones are present. The myeloid immunophenotypic findings show normal myeloid granularity, no increase in myeloid immaturity, and normal myeloid antigen maturation pattern, and the presence of hematogones (which are reported to be low in myelodysplasia). _____________________________________________________________________  PATHOLOGY: MOLECULAR   [employer] Advanced NGS Myeloid Panel Final Report  RESULT SUMMARY: ABNORMAL  DETECTED GENOMIC ALTERATIONS:   Tier I: Variants of Strong Clinical Significance   SF3B1 p.[id number]   Tier II: Variants of Potential Clinical Significance   DNMT3A p.?   Tier III: Variants of Unknown Clinical Significance   KIT p.Pif077Vgl   12/7/2023 HGB 10.4 WBC 3.47    9/18/2023  (Before and after stopping Revlimid)  WBC 1.14 > 6.08 Hgb 7.3  > 8.8  > 210  8/9/2023 Most recent blood work from 8/9/2023 WBC 1.14  Hgb 7.3     7/2/2023 Hgb 9.9, .4 WBC platelet normal  Cr 2.3 eGFR 28  Hypokalemic on 6/28 3.3  5/22/2023 WBC and platelet WNL Hgb 11.1  .9 CMP from 5/4-  Cr 1.79, eGFR 38 Pending CMP LDH UA today  3/24/2023 WBC 6.28 Hgb 10.2 .7 Platelet 224 Last Cr (2/24/23) - 2.42 Pending repeat CBC, CMP, LDH, UA  2/24/2023 WBC 2.62 Hgb 8.8 Platelet 128 Pending CMP, LDH, UA  1/26/2023 Last Hgb 9.1, Cr 2.3 (12/16/2022)    12/16/2022 HGB on 10/31/2022; 8.3,  Bone marrow biopsy:  Patient:   MASOOD MACARIO   Accession:                             [id number]  Collected Date/Time:                   10/31/2022 16:49 EDT Received Date/Time:                    10/31/2022 16:50 EDT  Hematopathology Addendum Report - Auth (Verified)  Hematopathology Addendum Additional immunohistochemical stains (block 1A: p53, ) show  increased  positive interstitial mast cells without aggregates. TP53  shows less than 1% bright positive cells.  There is no change in the diagnosis.  Verified by: Brooklynn Erazo (Electronic Signature) Reported on: 11/17/22 09:24 [facility], [facility], [address] [place] [address] Phone: [phone]   Fax: [phone] _________________________________________________________________  Disclaimer Slide(s) with built in immunohistochemical study control(s) and negative  control associated with this case has/have been verified by the sign out  pathologist.  For slide(s) without built in controls positive control slides has/have  been reviewed and approved by immunohistochemistry lab  These immunohistochemical/ in-situ hybridization tests have been developed  and their performance characteristics determined by [facility], Department of Pathology, Division of Immunopathology,  [address] [place] [address].  It has not been cleared  or approved by the U.S. Food and Drug Administration.  The FDA has  determined that such clearance or approval is not necessary.  This test  is used for clinical purposes.  The laboratory is certified under the  CLIA-88 as qualified to perform high complexity clinical testing. Hematopathology Addendum Report - Auth (Verified)  Hematopathology Addendum       Comprehensive Hematopathology Report  Final Diagnosis : 1, 2. 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 The bone marrow  shows hypercellularity with erythroid hyperplasia and increased ring  sideroblasts and dysplastic megakaryocytosis. Interstitial mast cells  are increased with normal morphology, few CD25 positive, negative CD30.  The findings show myelodysplastic syndrome with multilineage dysplasia  (hypolobulated megakaryocytes) and ring sideroblasts. Correlation with  cytogenetics, FISH, and somatic mutation analysis to evaluate for complex  karyotype, del(5q), -7, del(7q) SF3B1, TP53, other abnormalities is  done. Please note findings of an   abnormal male karyotype, 46,XY,del(5)(q15q33) [13]/46,XY[7],  a positive MDS FISH panel and [employer] Advanced NGS  Myeloid Report showing   Tier I: Variants of Strong Clinical Significance:  SF3B1 p.[id number] (VAF: 39%),   Tier II: Variants of Potential Clinical   Significance: DNMT3A p.?  (VAF: 4%), Tier III: Variants of Unknown  Clinical Significance:   KIT p.Mer497Cqu  (VAF: 50%) . Based on the  additional findings, the MDS classification (ICC) is MDS with del(5q) and  with WHO is MDS with low blasts and 5q deletion.  Dr. Mcmahon was notified of the diagnosis on 11/07/2022.  Morphology: Microscopic description: 1. Biopsy:   Sections of bone marrow biopsy and bone marrow fragments in  clot show hypercellularity (50-85% cellularity), erythroid predominance  with maturation and increased pronormoblasts, maturing and mature  myeloid elements, megakaryocytes at least normal in number with abnormal  morphology (increased hypolobulated/small forms), increased interstitial  mast cells without aggregates, and iron stores increased.  2. Aspirate:   Cellular spicules are present, adequate for interpretation.   Maturing and mature myeloid and erythroid elements are present with  erythroid predominance (M:E ratio (1.16:1).  Megakaryocytes appear at  least normal in number with small/hypolobulated morphology. Mast cells  are increased in the spicules (round and normally granular).  Bone Marrow Aspirate Differential: (100 Cells). Type  % Normal* Blast  0% 0-3 Neutrophil and    Precursors   47% 33-63 Eosinophil  3% 1-5 Basophil  0% 0-1 Pronormoblast  5% 0-2 Normoblast  43% 15-25 Monocyte  0% 0-2 Lymphocyte  7% 10-15 Plasma cell  0% 0-1   *Adult Range  Comment Iron stain (examined to evaluate for iron stores; see microscopic  description) and Giemsa stain (shows appropriate staining pattern) are  performed and evaluated on block(s): 1A, 1B  Ancillary Studies: Bone marrow aspirate iron stain:   Iron stores are increased; numerous ring  sideroblasts are present (greater than 15%).  Flow cytometry:   The myeloid immunophenotypic findings show decreased  myeloid granularity, no increase in myeloid immaturity (myeloblasts,  0.32% of cells, with normal immunophenotype), normal myeloid antigen  maturation pattern, few mast cells, and the presence of hematogones  (which are reported to be low in myelodysplasia). The lymphocyte immunophenotypic findings show no diagnostic abnormalities.  Immunohistochemical stains   (block 1A: CD34, , myeloperoxidase,  CD71, E-cadherin, factor VIII, CD15, CD61, CD25, CD30, p53) show no  increase in CD34 positive cells (less than 3%), erythroid predominance  (positive with CD71), with clusters of pronormoblasts (positive with  E-cadherin); diminished myeloid elements with maturation (positive   with myeloperoxidase and CD15), and increased megakaryocyte number with  dysplastic, small/hypolobulated morphology (positive with factor VIII,  CD61). CD30 is negative in mast cells; a few show dim CD25 positivity.  Cytogenetics:  [id number] Date Collected:  10/31/2022 Result:  Abnormal male karyotype Karyotype: 46,XY,del(5)(q15q33)[13]/46,XY[7]  Fluorescence in situ Hybridization (FISH):    [id number] Result: ABNORMAL FISH MDS PANEL - 5q deletion detected (65%) Probe(s) and Location(s):   P1Z247/ D5S23 (5p15.2), EGR1 (5q31), D7Z1  (7p11.1-q11.1), K6D998 (7q31), CEP-8/D8Z2   ? (8p11.1-q11.1), L83T150  (20q12) ISCN Nomenclature:  nuc clif(Q9F285/D6X81i7,EGR1x1)[130/200],  (D7Z1,C3E434)x2[200], (D8Z2,Q31F785)x2[197/200], (TP53x2)[197/200]  Molecular Analyses: [employer] Advanced NGS Myeloid Report Specimen ID:  [id number] Date Collected:  10/31/2022 Specimen Source:  Bone Marrow  RESULT SUMMARY:  ABNORMAL DETECTED GENOMIC ALTERATIONS: Tier I: Variants of Strong Clinical Significance SF3B1 p.[id number] Tier II: Variants of Potential Clinical Significance DNMT3A p.? Tier III: Variants of Unknown Clinical Significance KIT p.Kgv204Sil  PERTINENT NEGATIVE RESULTS: The following genes are NEGATIVE for clinically relevant mutations.  Mutational hotspots and surrounding exonic regions were interrogated for  DNA level point mutations and indels (fusions not assayed). ABL1, ANKRD26, ASXL1, ATRX, BCOR, BCORL1, BRAF, CALR, CBL, CCND2, CDKN2A,  CEBPA, CSF3R, CUX1, DDX41, ETNK1, ETV6, EZH2, FBXW7, FLT3, GATA2, HRAS,  IDH1, IDH2, JAK2, KDM6A, KMT2A, KRAS, MAP2K1, MPL, MYD88, NF1, NPM1,  NRAS, PDGFRA, PHF6, PTEN, PTPN11, RUNX1, SETBP1, SRSF2, STAG2, TET2,  TP53, U2AF1, WT1, ZRSR2 TECHNICAL SUMMARY : DNMT3A p.? c.2174-1G>A 4% allele frequency Exon 19 [id number]  SF3B1 p.Cpg706Jkd c.1998G>C 39% allele frequency Exon 14 [id number]   KIT p.Vfv207Gft c.1879C>T 50% allele frequency Exon 12 NM_000222.2  Clinical History/Data  : History of macrocytic anemia with CKD rule out primary bone marrow  disorder  CBC, 10/31/2022  Test Code       Result         Reference                  Range WBC             5.75 K/uL      3.80 - 10.50 RBC             2.34 M/uL L    4.20 - 5.80 HGB             8.3 g/dL L     13.0 - 17.0 HCT             25.4 % L       39.0 - 50.0 MCV             108.5 fl H     80.0 - 100.0 MCH             35.5 pg H      27.0 - 34.0 MCHC            32.7 g/dL      32.0 - 36.0 RDW             18.5 % H       10.3 - 14.5 PLT             236 K/uL       150 - 400 Auto NRBC       0 /100 WBCs    0 - 0 NEUT%           63.5 %         43.0 - 77.0  NEUT#           3.65 K/uL      1.80 - 7.40 LYMPH%          20.5 %         13.0 - 44.0 LYMPH#          1.18 K/uL      1.00 - 3.30 MONO%           7.0 %          2.0 - 14.0 MONO#           0.40 K/uL      0.00 - 0.90 EOS%            3.3 %          0.0 - 6.0 EOS#            0.19 K/uL      0.00 - 0.50 BASO%           1.4 %          0.0 - 2.0 BASO#           0.08 K/uL      0.00 - 0.20 BUN             28 mg/dL H     7 - 23 Creatinine      2.32 mg/dL H   0.50 - 1.30  Verified by: Brooklynn Erazo (Electronic Signature) Reported on: 11/09/22 16:40 [facility], [facility], [address] [place] [address] Phone: [phone]   Fax: [phone]  10/27/2022 (Labs on 10/18/2022): Hgb stable at 9.0, normal WBC, platelets Creatinine 2.32 eGFR 28   5/12/2022 Available labs reviewed.  Macrocytic anemia, no etiology could be determined.

## 2024-08-28 NOTE — ASSESSMENT
[FreeTextEntry1] : 84 y M seen in follow up for macrocytic anemia (initial evaluation: HGB ~9.5, MCV ~125), WBC and platelet counts are preserved. Patient now has some symptoms of anemia. 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 He was started on Revlimid in December but was unable to tolerate due to multiple side effects. Recent hospitalization for new onset afib + anemia. Course c/b acute gout flare, neutropenic fever, transaminitis, and JUAN RAMON on CKD. Since his discharge from the hospital, he has had two vagal episodes with afib RVR which is now being controlled by 25mg Metoprolol BID. Clinically, he is responding well to the Procrit injections with his Hgb ~10 and appears with more energy and strength. Restarted Revlimid 5mg daily but discontinued on 8/10/2023, and started on HMA. Weekly CBC and CMP to monitor his blood counts as well as liver/kidney function. Procrit injections discontinued before C7 treatment.    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 along with primary bone marrow disorder  - Bone marrow biopsy: MDS with 5q deletion (65% cells) and MDS-RS with SF3B1 (39% allele frequency)  - Discussed and reviewed bone marrow findings with the patient  - Started on Revlimid 5 mg daily (recommended dose 10 mg) and held in January 2023 due to cytopenias req hospitalization  - Started 20,000U Procrit injections since February with great response (Hgb 10+)  - Restarted Revlimid 5 mg daily (June 2023), tolerating well but cytopenia worsened  - Increase Procrit to 40,000 units QW (8/10/2023); hold for Hgb > 11  - Held Revlimid as of 8/10/2023 for worsening pancytopenia  - Cytopenia including HGB improved  - Discussed and planned to start HMA (S/Q)  - Patient consented (9/18/2023)  - C1D1 on 10/1/2023, C2 D1 on 10/30/2023  - Admitted to hospital for sepsis, Cycle 3 delayed  - Last transfusion was in 11/2023  - Post C4 bone marrow: Discussed; response noted; Tri-lineage hematopoiesis, no increase in blasts  - Transfuse for Hgb < 8 (if symptomatic) and platelets < 20k  - So far has not required transfusions since starting Dacogen tx  - Has not required Procrit since C7  - C/w trending CBC, CMP, LDH weekly home draws (order in)  - Proceed C11 treatment with Decitabine starting 9/5/2024 - Resume PPX antibacterial and antifungal  [] Afib, rate controlled, resolved  - New onset Afib  - Likely multifactorial etiology of dehydration and anemia  - [id number] score = 5  - On 6.25mg of Carvedilol  - Cardiology held ASA and Plavix due to anemia  - Evaluated by cards and started on Metoprolol  - Now afib rate controlled  - F/u w/ cards   [ ] Gout, resolved  - C/w Allopurinol and Colchicine  - Right toe without S&S of infection  - Continue Allopurinol  - Continue f/u with rheum   [ ] Back pain  - Last MRI in 2021 with lumbar spondylosis, with spinal canal narrowing  - Left flank pain may be r/t the stenosis causing pinched nerve pains  - Currently on Gabapentin 200mg daily  - Increased to Gabapentin 300mg TID  - Repeat MRI with no contrast (6/20) - chronic lumbar spondylosis  - Recommended spine specialist - pt deferred at this time and will f/u w/ rheum   RTC a week prior to C12 treatment   Case and management discussed with Dr. Mcmahon  Addendum 8/28/24 Discussed BW results with the pt UA neg. Can discontinue Levo and Fluconazole.  Continue Acyclovir

## 2024-08-28 NOTE — PHYSICAL EXAM
[Ambulatory and capable of all self care but unable to carry out any work activities] : Status 2- Ambulatory and capable of all self care but unable to carry out any work activities. Up and about more than 50% of waking hours [de-identified] : R medial component of ankle blister - wrapped and without purulent drainage.  [Normal] : normal appearance, no rash, nodules, vesicles, ulcers, erythema [de-identified] : seen in wheelchair [de-identified] : Mild conjunctival pallor.  [de-identified] : afib rate controlled  [de-identified] : ambulates w/ walker

## 2024-08-28 NOTE — PHYSICAL EXAM
[Ambulatory and capable of all self care but unable to carry out any work activities] : Status 2- Ambulatory and capable of all self care but unable to carry out any work activities. Up and about more than 50% of waking hours [de-identified] : R medial component of ankle blister - wrapped and without purulent drainage.  [Normal] : normal appearance, no rash, nodules, vesicles, ulcers, erythema [de-identified] : seen in wheelchair [de-identified] : Mild conjunctival pallor.  [de-identified] : afib rate controlled  [de-identified] : ambulates w/ walker

## 2024-08-28 NOTE — PHYSICAL EXAM
[Ambulatory and capable of all self care but unable to carry out any work activities] : Status 2- Ambulatory and capable of all self care but unable to carry out any work activities. Up and about more than 50% of waking hours [de-identified] : R medial component of ankle blister - wrapped and without purulent drainage.  [Normal] : normal appearance, no rash, nodules, vesicles, ulcers, erythema [de-identified] : seen in wheelchair [de-identified] : Mild conjunctival pallor.  [de-identified] : afib rate controlled  [de-identified] : ambulates w/ walker

## 2024-08-28 NOTE — ASSESSMENT
[FreeTextEntry1] : 84 y M seen in follow up for macrocytic anemia (initial evaluation: HGB ~9.5, MCV ~125), WBC and platelet counts are preserved. Patient now has some symptoms of anemia. 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 He was started on Revlimid in December but was unable to tolerate due to multiple side effects. Recent hospitalization for new onset afib + anemia. Course c/b acute gout flare, neutropenic fever, transaminitis, and JUAN RAMON on CKD. Since his discharge from the hospital, he has had two vagal episodes with afib RVR which is now being controlled by 25mg Metoprolol BID. Clinically, he is responding well to the Procrit injections with his Hgb ~10 and appears with more energy and strength. Restarted Revlimid 5mg daily but discontinued on 8/10/2023, and started on HMA. Weekly CBC and CMP to monitor his blood counts as well as liver/kidney function. Procrit injections discontinued before C7 treatment.    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 along with primary bone marrow disorder  - Bone marrow biopsy: MDS with 5q deletion (65% cells) and MDS-RS with SF3B1 (39% allele frequency)  - Discussed and reviewed bone marrow findings with the patient  - Started on Revlimid 5 mg daily (recommended dose 10 mg) and held in January 2023 due to cytopenias req hospitalization  - Started 20,000U Procrit injections since February with great response (Hgb 10+)  - Restarted Revlimid 5 mg daily (June 2023), tolerating well but cytopenia worsened  - Increase Procrit to 40,000 units QW (8/10/2023); hold for Hgb > 11  - Held Revlimid as of 8/10/2023 for worsening pancytopenia  - Cytopenia including HGB improved  - Discussed and planned to start HMA (S/Q)  - Patient consented (9/18/2023)  - C1D1 on 10/1/2023, C2 D1 on 10/30/2023  - Admitted to hospital for sepsis, Cycle 3 delayed  - Last transfusion was in 11/2023  - Post C4 bone marrow: Discussed; response noted; Tri-lineage hematopoiesis, no increase in blasts  - Transfuse for Hgb < 8 (if symptomatic) and platelets < 20k  - So far has not required transfusions since starting Dacogen tx  - Has not required Procrit since C7  - C/w trending CBC, CMP, LDH weekly home draws (order in)  - Proceed C11 treatment with Decitabine starting 9/5/2024 - Resume PPX antibacterial and antifungal  [] Afib, rate controlled, resolved  - New onset Afib  - Likely multifactorial etiology of dehydration and anemia  - [id number] score = 5  - On 6.25mg of Carvedilol  - Cardiology held ASA and Plavix due to anemia  - Evaluated by cards and started on Metoprolol  - Now afib rate controlled  - F/u w/ cards   [ ] Gout, resolved  - C/w Allopurinol and Colchicine  - Right toe without S&S of infection  - Continue Allopurinol  - Continue f/u with rheum   [ ] Back pain  - Last MRI in 2021 with lumbar spondylosis, with spinal canal narrowing  - Left flank pain may be r/t the stenosis causing pinched nerve pains  - Currently on Gabapentin 200mg daily  - Increased to Gabapentin 300mg TID  - Repeat MRI with no contrast (6/20) - chronic lumbar spondylosis  - Recommended spine specialist - pt deferred at this time and will f/u w/ rheum   RTC a week prior to C12 treatment   Case and management discussed with Dr. Mcmahon  Addendum 8/28/24 Discussed BW results with the pt UA neg. Can discontinue Levo and Fluconazole.  Continue Acyclovir

## 2024-08-28 NOTE — HISTORY OF PRESENT ILLNESS
[Treatment Protocol] : Treatment Protocol [de-identified] : CHART REVIEW: 81-year-old Mr. MACARIO is seen in consult for macrocytic anemia (Hgb 9.5, MCV ~125). Patient has multiple medical conditions including stage-3 CKD. He has no symptoms of anemia and has no complaints.  [FreeTextEntry1] : Dacogen q28 days. C1: 10/2/23 - 10/6/23. C2: 10/30/23 - 11/3/23. C3:12/11/23 - 12/15/23 (delayed; cancelled). C4: 1/8/24-1/12/24. C5: 2/12/24-2/16/24. C6: 3/11/23-3/15/24. C7: 4/8/24-4/12/24. C8: 5/6-5/10. C9 (delayed by a month d/t fevers): 7/8-7/12. C10: 8/5-8/9.  [de-identified] : 05/12/22: 81 year-old Mr. MACARIO is seen in consult for macrocytic  anemia (Hgb 9.5, MCV ~125). Patient has multiple medical conditions including stage-3 CKD. He has no symptoms of anemia and has no complaints.   10/27/22: Patient presenting today for follow up for macrocytic anemia. Overall feels well. Endorses some fatigue and shortness of breath with activity. Denies headache, dizziness, Ambulates with walker. He is being followed by his nephrologist who manages his hypertension. Endorses taking diuretics every other day.    12/16/2022 Patient returns for a follow up. He endorses no change in his health status. 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 Patient has some symptoms of fatigue and tiredness.   1/26/2023 Patient returns for a follow up. He started taking Revlimid on 12/30/2022. About 2 weeks later he started developing side effects like-- loss  of appetite, body ache, constipation, pins and needle sensation. He also appears to have developed Jaundice.   2/24/2023 Patient seen in follow up, accompanied by Preeti ceja, at bedside. He had a recent hospitalization from 1/27 - 2/7 for new onset afib that was detected in the treatment room prior to his blood transfusion appointment. He was found to be in afib, anemic (Hgb 6.3) requiring 4 PRBCs throughout his stay. His course was c/b acute gout flare, neutropenic fever, transaminitis, and JUAN RAOMN on CKD. 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 Had an episode of neutropenic fever started on Cefepime but stopped as per ID, most likely due to gout flare. UA + but asymptomatic, not treated. Bld cx neg x2. Syncopal episode while being on the commode yesterday 2/24. As per Preeti, EKG revealed rate controlled afib. Today, his HR was 127 in office. Denies dizziness, SOB, chest pain, palpitations. As per pt, was on a holter monitor that was submitted yesterday. He is f/u with Dr. Reddy (cards). He also have been having a dry cough for few days. + chills. No fever, body aches, night sweats. No sick contacts. Working with PT. Still feels weak, ambulating via wheelchair. Appetite is okay. Weight is stable.   3/24/2023 Patient seen in follow up. Accompanied by Preeti ceja at bedside. He has been responding well to 20k U Procrit weekly injections. Last Hgb 10.2, BP stable 111/58. Since his last visit, has been evaluated by cards for his afib (now rate controlled) currently on Metoprolol 25mg BID. He reports feeling much better since last encounter - he is now able to ambulate longer distances with a walker. Endorses unsteady gait (vertigo). Appetite is good, weight is stable.   5/22/2023 Patient seen in follow up. Accompanied by aide and daughter at bedside. Had a recent UTI, treated with Amoxicillin. He'll be following up with his PCP tomorrow. He endorses left flank pain that started after his UTI that is hindering him from ambulating. Describes the pain to be "shocking" and sharp in nature, pain reproduced by movements. His last MRI was in 2021 which revealed lumbar spondylosis with mild to moderate spinal canal narrowing. + peripheral neuropathy of his right lower ext. Currently on Gabapentin 200mg daily. His Hgb has been stable since April 27th. Last Hgb 11.1 last week. Otherwise, no complaints. Appetite is good and weight is stable.  7/5/2023 Patient seen in follow up. With aide and daughter at bedside. Interim, he's been feeling well. He completed first month of starting Revlimid without any side effects. His most recent CBC from last week 9.7, he plans on self administering Procrit today after the visit. He had MRI done 6/20 which showed degenerative vs inflammation of his lumbar spine. He continues to experience back pain and some scapular pain from pulled muscle last week. Recommended ortho / pain management for chronic back pain but pt denied at this time. He reports feeling weaker in his lower extremities but does not want to participate in PT at this time. Otherwise, feeling well. Appetite is good, weight is stable.   8/10/2023 Patient presents for a follow up. He appears pale. He feels weak and sleepy after taking the Lenalidomide pill. He is not doing any PT. He is not able to walk by himself yet. Hs anemia has become worse despite EUGENIA. Lenalidomide may not be helping to improve his disease but worsening the pancytopenia.    9/18/2023 Patient seen in follow up. He was taken off Revlimid in his last visit and his EUGENIA dose was increased to 40KU. His HGB increased to ~9g (from 7) and neutropenia and thrombocytopenia resolved. He looks and feels better. We discussed starting HMA.   12/7/2023 Patient presents for a follow up and evaluation for treatment. He completed 2 cycles of HMA (Dacogen). Before the 3rd cycle, he was admitted to the hospital for low O2Sat. He received IV abx for suspected sepsis. He was discharged a week ago. His physical condition has improved. His last transfusion (PRBC) was 3 weeks ago (11/2023). His Hgb is 10.4, platelets 175, WBC 3.47. He is continuing on Retacrit at home. He is holding his counts without transfusions. It appears that he is responding to the treatment.   3/11/2024 Patient seen the tx room for a follow up appt. Today is C6D1. He is accompanied by Preeti, his aide and his son at bedside. Interim, he's been in good health. He is now able to climb up the stairs. He is slowly regaining his strength. He has been giving himself the Procrit despite his Hgb being > 11, we had a lengthy discussion again today going over the parameters for Procrit injections (ONLY ADMINISTER WHEN HGB < 11). The patient, as well as his aide and his son verbalized full understanding  4/8/2024 Patient seen in the tx room for a f/u. Today is C7D1 of Dacogen. He is accompanied by Preeti, his aide. Interim, he's been in good health. He has stopped his Procrit since his Hgb has been > 11. He reports that his legs feel weak and cannot climb the stairs well without falling. He plans to resume PT to regain his strength.  4/26/2024  Patient presents for a follow up for MDS on HMA. He also has CHK (eGFR 39) He has been on Decitabine monthly. He responded well to the treatment. He became transfusion independent, his counts normalized, He is not on EUGENIA or GCSF or TPO-RA. He slowly gained strength and now able to ambulate with a walker. He is going for a vascular surgery evaluation for possible stent in the LE vessels. He has no complaints.   5/29/2024 Patient seen in follow up. Accompanied by his HHA. Interim, he's been in good health. Tolerated his C8 with no issues. Counts have been stable, receiving Procrit for Hgb <11  6/28/2024  Patient seen in follow up for MDS, accompanied by HHA. Recently hospitalized 6/11 for sepsis with Stenotrophomonas, MRSE bacteremia requiring PICC line be pulled and replaced. He completed a course of bactrim as of the June 27th. MRCP completed and significant for choledocholithiasis and 3mm pancreatic cyst, no inpatient intervention required. He endorses 1 R foot blister evaluated by wound care on regular basis without purulent drainage. Denies fever, chills, cough, dyspnea. Still maintains adequate physical status - ambulates with assistance of walker.    8/2/2024 Patient returns for a follow up. He has completed 10 cycles of HMA (Decitabine) and seems to be holding response. He has not needed any transfusion since C2 Tx. He has also taken off EUGENIA at C5. His HGB harbors at 10.x,, normal WBC and platelet count. However, his today's counts have dropped. The patient however has recurrent UTI. He just finished a course of ABX and now having symptoms again.   8/27/2024 Patient seen in follow up. He completed 10 cycles of Decitabine. Interim, he has been in good health. He is gaining weight. He feels well. Reports difficulty with balance and uses walker to ambulate. He was seen by ID recently and was prescribed Doxy for redness at the insertion site. He denies any pain, swelling of the extremity, pus formation. UTI symptoms resolved.

## 2024-08-30 LAB
ALBUMIN SERPL ELPH-MCNC: 4.1 G/DL
ALP BLD-CCNC: 78 U/L
ALT SERPL-CCNC: 13 U/L
ANION GAP SERPL CALC-SCNC: 14 MMOL/L
AST SERPL-CCNC: 23 U/L
BILIRUB SERPL-MCNC: 0.4 MG/DL
BUN SERPL-MCNC: 39 MG/DL
CALCIUM SERPL-MCNC: 9.2 MG/DL
CHLORIDE SERPL-SCNC: 105 MMOL/L
CO2 SERPL-SCNC: 23 MMOL/L
CREAT SERPL-MCNC: 2.35 MG/DL
EGFR: 27 ML/MIN/1.73M2
GLUCOSE SERPL-MCNC: 177 MG/DL
LDH SERPL-CCNC: 212 U/L
POTASSIUM SERPL-SCNC: 4.8 MMOL/L
PROT SERPL-MCNC: 6.7 G/DL
SODIUM SERPL-SCNC: 142 MMOL/L
URATE SERPL-MCNC: 7 MG/DL

## 2024-09-03 ENCOUNTER — APPOINTMENT (OUTPATIENT)
Dept: INTERNAL MEDICINE | Facility: CLINIC | Age: 84
End: 2024-09-03
Payer: MEDICARE

## 2024-09-03 VITALS
TEMPERATURE: 97.5 F | OXYGEN SATURATION: 100 % | SYSTOLIC BLOOD PRESSURE: 125 MMHG | WEIGHT: 184.7 LBS | HEART RATE: 65 BPM | HEIGHT: 67 IN | BODY MASS INDEX: 28.99 KG/M2 | RESPIRATION RATE: 16 BRPM | DIASTOLIC BLOOD PRESSURE: 70 MMHG

## 2024-09-03 VITALS — DIASTOLIC BLOOD PRESSURE: 74 MMHG | SYSTOLIC BLOOD PRESSURE: 132 MMHG

## 2024-09-03 DIAGNOSIS — I73.9 PERIPHERAL VASCULAR DISEASE, UNSPECIFIED: ICD-10-CM

## 2024-09-03 DIAGNOSIS — R26.89 OTHER ABNORMALITIES OF GAIT AND MOBILITY: ICD-10-CM

## 2024-09-03 DIAGNOSIS — N18.32 CHRONIC KIDNEY DISEASE, STAGE 3B: ICD-10-CM

## 2024-09-03 DIAGNOSIS — I48.91 UNSPECIFIED ATRIAL FIBRILLATION: ICD-10-CM

## 2024-09-03 DIAGNOSIS — I25.10 ATHEROSCLEROTIC HEART DISEASE OF NATIVE CORONARY ARTERY W/OUT ANGINA PECTORIS: ICD-10-CM

## 2024-09-03 DIAGNOSIS — E66.9 OBESITY, UNSPECIFIED: ICD-10-CM

## 2024-09-03 DIAGNOSIS — R26.81 UNSTEADINESS ON FEET: ICD-10-CM

## 2024-09-03 PROCEDURE — 99214 OFFICE O/P EST MOD 30 MIN: CPT

## 2024-09-03 NOTE — HISTORY OF PRESENT ILLNESS
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

## 2024-09-03 NOTE — PHYSICAL EXAM
[Normal Sclera/Conjunctiva] : normal sclera/conjunctiva [Normal Outer Ear/Nose] : the outer ears and nose were normal in appearance [Normal Rate] : normal rate  [Normal S1, S2] : normal S1 and S2 [No Carotid Bruits] : no carotid bruits [No Abdominal Bruit] : a ~M bruit was not heard ~T in the abdomen [No Varicosities] : no varicosities [Pedal Pulses Present] : the pedal pulses are present [No Palpable Aorta] : no palpable aorta [No Extremity Clubbing/Cyanosis] : no extremity clubbing/cyanosis [Normal] : affect was normal and insight and judgment were intact [de-identified] : 2/6 systolic murmur [de-identified] : Bilateral edema feet look good no evidence of infection bilateral 1+ edema

## 2024-09-03 NOTE — ASSESSMENT
[facility]ORY ENCOUNTER  [facility] HISTORY AND PHYSICAL      Chief Complaint:   Chief Complaint   Patient presents with   • Physical   • UTI     Concerns for UTI; urine frequency, cloudy urine, odor. Denies burning with urination. Low back pain is not new.   • Imm/Inj     shingrix       HPI:  Navya is a 53 year old female presenting for annual physical. They would like to discuss the following concerns:    1. Physical - multiple concerns. Admits months of increased urine frequency, urgency, incontinence at night in particular. Cloudiness, foul odor present. Denies fishy smell or fruity smell. \"Old\" smell. Gradually getting worse. No new back pain, fevers, chills, groin rash, vaginal discharge, itching, new sexual partners. She thinks maybe kidney stone related, but no blood, new back pain.     Advanced Directive:  No      Over the last 2 weeks, how often have you been bothered by the following problems?          PHQ2 Score: 1  PHQ2 Score Interpretation: No further screening needed  1. Little interest or pleasure in activity?: 0  2. Feeling down, depressed, or hopeless?: 1       Generalized Anxiety Disorder (GAD7)    Over the last 2 weeks, how often have you been bothered by the following problems?   Score:  4    Score:   0-4 minimal symptoms 10-14 moderate symptoms   5-9 mild symptoms 15-21 severe symptoms      1.  Feeling nervous, anxious or on edge Several days   2.  Not being able to stop or control worrying Several days   3.  Worrying too much about different things Several days   4.  Trouble relaxing Not at all   5.  Being so restless that it's hard to sit still Not at all   6.  Becoming easily annoyed or irritable Several days   7.  Feeling afraid as if something awful might happen Not at all            If you checked off any problems, how difficult have these made it for you to do your work, take care of things at home, or get along with other people?             ROS:  Review of Systems  [FreeTextEntry1] : Patient is a 84-year-old male with history of coronary artery disease, atrial fibrillation history of SVT, moderate to severe AAS, obesity, fatty liver, peripheral vascular disease, spinal stenosis, myelodysplastic syndrome who presents for follow-up  1 balance/leg weakness Multifactorial including peripheral vascular disease edema spinal stenosis Referral for physical therapy  2.  Moderate to severe AAS Follow-up with cardiology  3.  Coronary artery disease Stable asymptomatic continue current therapy continue aspirin Plavix  4 gout Continue allopurinol 102 tabs daily next visit check uric acid  5.  Hypertension  continue amlodipine 5 mg  6 hyperlipidemia Continue Crestor 10 Check lipid profile  7.  Peripheral vascular disease Continue aspirin  8 ascending aortic dilatation Follow-up with cardiology  9 chronic edema Multifactorial continue elevation and furosemide 40 mg a day check electrolytes  10 chronic kidney disease Check BUN/creatinine Multifactorial observe  11.  Recurrent urinary tract infection Recent UA negative  12.  Mild dysplastic syndrome Continue chemo and observation as per oncology follow-up in 6 weeks    Constitutional: Negative.  Negative for chills, fever, malaise/fatigue and weight loss.   HENT: Negative.  Negative for congestion, ear discharge, ear pain, sinus pain, sore throat and tinnitus.    Eyes: Negative.  Negative for double vision, pain and discharge.   Respiratory: Negative.  Negative for cough, sputum production, shortness of breath and wheezing.    Cardiovascular: Negative.  Negative for chest pain, palpitations and leg swelling.   Gastrointestinal: Negative for abdominal pain, blood in stool, diarrhea, melena, nausea and vomiting.   Genitourinary:        HPI   Musculoskeletal: Negative for falls and myalgias.   Skin: Negative.  Negative for itching and rash.   Neurological: Negative for dizziness, tingling, tremors, sensory change, speech change, focal weakness and weakness.   Endo/Heme/Allergies: Negative.  Negative for polydipsia. Does not bruise/bleed easily.   Psychiatric/Behavioral: Negative for hallucinations, memory loss, substance abuse and suicidal ideas.       PMH:  Past Medical History:   Diagnosis Date   • Anxiety    • Bilateral ovarian cysts    • Chronic insomnia    • Chronic LBP    • Chronic pain    • Kidney stone on left side 5/2/2013   • Lumbar radiculopathy    • Melanoma (CMS/HCC) 1989    s/p excision NOS   • Migraine headaches    • Nephrolithiasis     about every 5 years   • Postlaminectomy syndrome    • Psoriasis    • Small bowel obstruction (CMS/HCC)    • Ureteropelvic junction calculus 12/12/2011    right       PSH:   Past Surgical History:   Procedure Laterality Date   • Abdomen surgery  9-    laparotomy with lysis of adhesions   • Back surgery      low back x 5 due to degenerative disc disease   • Colonoscopy  05/17/2007   • Cystourethroscopy  09/08/2004   • Extracorporeal shock wave lithotripsy  08/01/2012    left UPJ calculus, with stent   • Hysterectomy     • Radiofrequency ablation     • Service to gastroenterology     • Skin cancer excision  [id number]    resection  melanoma right hip   • Spinal cord stimulator implant      -removed   • Spinal cord stimulator removal  12/29/2017   • Total abdominal hysterectomy w/ bilateral salpingoophorectomy  1998       MEDICATIONS:  Current Outpatient Medications   Medication Sig   • Eszopiclone 3 MG tablet Take 1 tablet by mouth at bedtime as needed for Sleep.   • lamoTRIgine (LAMICTAL) 100 MG tablet TAKE 1 TABLET BY MOUTH EVERY DAY   • hydrOXYzine (ATARAX) 25 MG tablet TAKE 1/2 TO 1 TABLET BY MOUTH TWICE DAILY AS NEEDED ACUTE ANXIETY   • desvenlafaxine (PRISTIQ) 100 MG 24 hr tablet Take 100 mg by mouth daily.   • propranolol (INDERAL LA) 80 MG 24 hr capsule TAKE 1 CAPSULE BY MOUTH DAILY   • HYSINGLA ER 20 MG Tablet Extended Release 24 hour Abuse-Deterrent TK 1 T PO Q 24 H   • ibuprofen (MOTRIN) 200 MG tablet Take 200 mg by mouth every 6 hours as needed for Pain.   • Melatonin 10 MG Tab Take 10 mg by mouth daily.   • loratadine (CLARITIN) 10 MG tablet Take 10 mg by mouth daily.   • traMADOL (ULTRAM) 50 MG tablet Take 50 mg by mouth 4 times daily.   • SUMAtriptan (IMITREX) 50 MG tablet TAKE 1 TO 2 TABLETS BY MOUTH AT ONSET OF MIGRAINE. REPEAT DOSE AFTER 2 HOURS AS NEEDED. MAX OF 4 TABLETS IN 24 HOURS   • ALPRAZolam (XANAX) 0.5 MG tablet TAKE 1 TABLET BY MOUTH 4 TIMES DAILY AS NEEDED FOR ANXIETY.     No current facility-administered medications for this visit.        ALLERGIES:  ALLERGIES:   Allergen Reactions   • Latex   (Environmental) RASH and PRURITUS   • Toradol [Ketorolac Tromethamine] PRURITUS   • Varenicline HEADACHES     - migraine x4 days       FAMILY HISTORY:  Family History   Problem Relation Age of Onset   • Hypertension Mother    • Hypertension Father    • Diabetes Maternal Aunt    • Stroke Paternal Uncle    • Cancer Maternal Grandmother    • Cancer Paternal Aunt         colon       SOCIAL HISTORY:  Social History     Socioeconomic History   • Marital status:      Spouse name: Not on file   • Number of children: 2   •  Years of education: Not on file   • Highest education level: Not on file   Occupational History   • Not on file   Social Needs   • Financial resource strain: Not on file   • Food insecurity     Worry: Not on file     Inability: Not on file   • Transportation needs     Medical: Not on file     Non-medical: Not on file   Tobacco Use   • Smoking status: Current Every Day Smoker     Packs/day: 0.50     Years: 33.00     Pack years: 16.50     Types: Cigarettes     Start date: 1/1/1986   • Smokeless tobacco: Never Used   Substance and Sexual Activity   • Alcohol use: No     Alcohol/week: 0.0 standard drinks   • Drug use: No   • Sexual activity: Not Currently     Partners: Male   Lifestyle   • Physical activity     Days per week: Not on file     Minutes per session: Not on file   • Stress: Not on file   Relationships   • Social connections     Talks on phone: Not on file     Gets together: Not on file     Attends [employer] service: Not on file     Active member of club or organization: Not on file     Attends meetings of clubs or organizations: Not on file     Relationship status: Not on file   • Intimate partner violence     Fear of current or ex partner: Not on file     Emotionally abused: Not on file     Physically abused: Not on file     Forced sexual activity: Not on file   Other Topics Concern   • Not on file   Social History Narrative   • Not on file         PHYSICAL EXAM:  Visit Vitals  BP 90/68 (BP Location: LUE - Left upper extremity, Patient Position: Sitting, Cuff Size: Large Adult)   Pulse 60   Ht 5' 6\" (1.676 m)   Wt 77.1 kg   BMI 27.44 kg/m²     Physical Exam   Constitutional: She is oriented to person, place, and time and well-developed, well-nourished, and in no distress.   HENT:   Head: Normocephalic and atraumatic.   Right Ear: External ear normal.   Left Ear: External ear normal.   Nose: Nose normal.   Mouth/Throat: Oropharynx is clear and moist. No oropharyngeal exudate.   Eyes: Pupils are equal, round,  and reactive to light. Conjunctivae and EOM are normal. Right eye exhibits no discharge. Left eye exhibits no discharge.   Neck: Normal range of motion. Neck supple. No thyromegaly present.   Cardiovascular: Normal rate, regular rhythm, normal heart sounds and intact distal pulses.   No murmur heard.  Pulmonary/Chest: Effort normal and breath sounds normal. No respiratory distress. She has no wheezes. She has no rales.   Abdominal: Soft. Bowel sounds are normal. She exhibits no distension and no mass. There is no hepatosplenomegaly. There is no abdominal tenderness. There is no rebound and no guarding.   Genitourinary:    Vulva, vagina and cervix normal.   Rectum:      No external hemorrhoid.      No vaginal discharge, mucosa abnormailty or exudate.      No lesions in the vagina.      Genitourinary Comments: Medical assistant present during exam     Musculoskeletal: Normal range of motion.         General: No deformity or edema.   Lymphadenopathy:        Head (right side): No submental, no submandibular, no preauricular, no posterior auricular and no occipital adenopathy present.        Head (left side): No submental, no submandibular, no preauricular, no posterior auricular and no occipital adenopathy present.     She has no cervical adenopathy.        Right: No supraclavicular adenopathy present.        Left: No supraclavicular adenopathy present.   Neurological: She is alert and oriented to person, place, and time. She has normal sensation, normal strength, normal reflexes and intact cranial nerves. She displays no atrophy, no tremor and normal speech. She exhibits normal muscle tone. She has a normal Cerebellar Exam and a normal Tandem Gait Test. Gait normal. Coordination and gait normal.   Skin: Skin is warm and dry. She is not diaphoretic. No erythema.   Psychiatric: Mood, affect and judgment normal.         LABS:  All pertinent lab results were reviewed.    IMAGING:  -reviewed    ASSESSMENT / PLAN:  Navya was  seen today for physical, uti and imm/inj.    Diagnoses and all orders for this visit:    Annual physical exam  - Overall discussed general health and wellness, goal BMI/weight management, diet, exercise, safety, sleep hygiene, safe sexual practices, appropriate alcohol consumption to prevent liver disease/cirrhosis, tobacco and drug avoidance.  -     Cancel: CBC WITH DIFFERENTIAL; Future  -     Cancel: COMPREHENSIVE METABOLIC PANEL; Future  -     Cancel: THYROID STIMULATING HORMONE REFLEX; Future  -     THYROID STIMULATING HORMONE REFLEX; Future  -     COMPREHENSIVE METABOLIC PANEL; Future  -     CBC WITH DIFFERENTIAL; Future    Urine frequency  Abnormal urine odor  Vaginal odor  - she declined Gc/Chlamydia or additional testing; did agree to testing below  - no obvious cause on exam; results ultimately normal so referred to gynecology for symptoms and incontinence  - consider lumbar MRI if incontinence is getting worse or occurring during the day; unclear why only at night  - educated on less caffeine, less evening fluids etc  -discussed risks of neurological emergencies such as saddle anesthesia, signs symptoms cauda equina syndrome and she is able to teach back understanding  -     WET MOUNT; Future  -     WET MOUNT  -     TRICHOMONAS RAPID TEST  -     Cancel: POCT URINE DIP NON-AUTO  -     POCT URINE DIP NON-AUTO; Future  -     Cancel: URINALYSIS WITH MICRO & CULTURE IF INDICATED  -     URINALYSIS & REFLEX MICRO WITH CULTURE IF INDICATED; Future      Screening for breast cancer  -     MAMMO SCREENING BILATERAL; Future    Need for vaccination  -     ZOSTER SHINGLES RECOMBINANT ADJUVANTED IM(SHINGRIX)    Tobacco dependence  - not ready to quit    Anxiety  Other insomnia  - stable      History of lumbar fusion  - chronic back pain from this; intermittent sciatica      Health Maintenance Due   Topic Date Due   • Breast Cancer Screening  05/06/2020         Return if symptoms worsen or fail to improve.    Juan TOMLIN  Prince, DO

## 2024-09-03 NOTE — REVIEW OF SYSTEMS
[Chest Pain] : no chest pain [Palpitations] : no palpitations [Claudication] : no  leg claudication [Lower Ext Edema] : lower extremity edema [Paroxysmal Nocturnal Dyspnea] : no paroxysmal nocturnal dyspnea [Joint Pain] : no joint pain [Joint Stiffness] : no joint stiffness [Muscle Pain] : no muscle pain [Muscle Weakness] : muscle weakness [Back Pain] : no back pain [Joint Swelling] : no joint swelling [Negative] : Psychiatric

## 2024-09-04 ENCOUNTER — NON-APPOINTMENT (OUTPATIENT)
Age: 84
End: 2024-09-04

## 2024-09-04 ENCOUNTER — APPOINTMENT (OUTPATIENT)
Dept: CARDIOLOGY | Facility: CLINIC | Age: 84
End: 2024-09-04
Payer: MEDICARE

## 2024-09-04 VITALS
OXYGEN SATURATION: 100 % | WEIGHT: 184.7 LBS | HEIGHT: 67 IN | BODY MASS INDEX: 28.99 KG/M2 | SYSTOLIC BLOOD PRESSURE: 148 MMHG | DIASTOLIC BLOOD PRESSURE: 82 MMHG | HEART RATE: 79 BPM

## 2024-09-04 DIAGNOSIS — Z87.891 PERSONAL HISTORY OF NICOTINE DEPENDENCE: ICD-10-CM

## 2024-09-04 DIAGNOSIS — I35.0 NONRHEUMATIC AORTIC (VALVE) STENOSIS: ICD-10-CM

## 2024-09-04 PROCEDURE — 99214 OFFICE O/P EST MOD 30 MIN: CPT

## 2024-09-04 PROCEDURE — G2211 COMPLEX E/M VISIT ADD ON: CPT

## 2024-09-04 NOTE — REASON FOR VISIT
[Follow-Up - Clinic] : a clinic follow-up of [FreeTextEntry2] : PAD [FreeTextEntry1] : 9/4/2024  Since last visit patient reports no C/P or SOB. No wounds on the feet, all healed. Bilateral LE edema. Still on chemotherapy. R PICC line. Remains on Lasix.   TTE 6/2024:  1. 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  2. Left ventricular cavity is small. Left ventricular wall thickness is normal. Left ventricular systolic function is severely decreased. Regional wall motion abnormalities present.  3. Moderate to severe aortic stenosis.  4. There is moderate (grade 2) left ventricular diastolic dysfunction, with elevated filling pressure.  5. Normal right ventricular cavity size, with normal wall thickness, and reduced systolic function.  6. No pericardial effusion seen.  7. Compared to the transthoracic echocardiogram performed on 2/9/2024, The EF is slightly lower.. Findings were discussed with Dr. Ahmet Zhang on 6/11/2024.  8. Multiple segmental abnormalities exist.  5/24  Since last visit patient reports R medial foot near posterior heel with wound (reports began 2-3 weeks ago)  L 4th digit wound.  Healed R 2nd to 4th digit eschar. Healed L 3rd digit wound.    R PICC line for MDS treatment.   LE arterial duplex 4/2024: *  Significant disease below the knees with bilateral monophasic and tardus parvus waveforms. *  Collateral vessel formation is noted suggesting nondocumented occlusive disease.  Medications: Crestor 10 mg daily Gabapentin Omeprazole Acyclovir Norvasc 5 mg daily Furosemide 40 mg daily Allopurinol ASA  Plavix   3/27  Since last visit patient reports injured right shin last weekend.  Also has eschar to R 2-4 digits, and L 3rd digit. Denies fever / chills. LE arterial duplex ordered last visit, was not performed.   TTE 2/2024  1. Left ventricular wall thickness is normal. Left ventricular systolic function is mildly decreased with an ejection fraction of 46 % by Andersen's method of disks. Regional wall motion abnormalities present.  2. Basal and mid inferior wall, basal and mid inferolateral wall, and basal anterolateral segment are abnormal.  3. There is calcification of the aortic valve leaflets. There is moderate aortic stenosis. The peak transaortic velocity is 2.30 m/s, peak transaortic gradient is 21.2 mmHg and mean transaortic gradient is 12.0 mmHg with an LVOT/aortic valve VTI ratio of 0.31. The aortic valve area is estimated at 1.06 cm by the continuity equation. There is mild aortic regurgitation.  4. There is mild (grade 1) left ventricular diastolic dysfunction, with normal filling pressure.  5. Normal right ventricular cavity size, wall thickness, and normal systolic function.  6. Mild mitral regurgitation.  7. No pericardial effusion seen.  8. Compared to the transthoracic echocardiogram performed on 10/3/2022, The aortic stenosis is now moderate.  3/19 Patient reports feeling improved. Reports residual LE edema. Continue Lasix daily. Office visit next week. Will check labs during that visit.  3/6 BNP elevated. Last CXR with pulmonary congestion noted. Currently reports taking Lasix 40 mg three times weekly. Increase Lasix 40 mg daily.  12/13  Patient currently without C/P or SOB. No LE edema. R hallux and 3rd digit wounds. L hallux wound.   Cr. improved to 1.8. Platelet count currently 150. Last Hgb above 10.  Hospital Course 11/20-11/27  Patient admitted for acute hypoxic respiratory failure due to decompensated heart failure. Labs remarkable for pancytopenia (QBC 1.63, Hgb 8.1, Plt 100) and proBNP 6700. TTE showed newly reduced EF of 40%, moderate LV diastolic dysfunction, moderate AS, normal RV size and function, basal and mid anterolateral wall, basal and mid inferior wall, basal and mid inferolateral wall, and basal anteroseptal segment are abnormal. CTA C/A/P showed small bilateral pleural effusions with mild interlobular septal thickening which may reflect mild pulmonary interstitial edema and compressive bilateral lower lobe atelectasis  Patient initially required BIPAP for increased work of breathing and tachypnea. He was started on IV diuretics and was eventually weaned to room air. Cardiology consulted for ischemic evaluation due to newly reduced EF seen on echocardiogram. Patient developed JUAN RAMON on CKD so LHC was deferred. JUAN RAMON thought to be secondary to contrast induced nephropathy. Kidney and bladder US showed atrophic right kidney, no hydronephrosis. On discharge, serum creatinine was 2.24.  Patient had intermittent episodes of atrial tachycardia while hospitalized. Home metoprolol succinate was increased to 150 mg XL.  Heme was consulted for pancytopenia and recommended no inpatient chemo. Patient received 1 unit of pRBCs to maintain Hgb>8. He showed no signs or symptoms of bleeding.  PT evaluated patient and recommended NERI. Patient declined NERI and is being discharged home with home PT.   Medication Changes: -Increased metoprolol succinate to 150 mg XL daily. -Stopped cilostazol due to black box warning for heart failure.   Medications:  acyclovir 400 mg oral tablet: 1 tab(s) orally 2 times a day allopurinol 100 mg oral tablet: 1 tab(s) orally once a day amLODIPine 5 mg oral tablet: 1 tab(s) orally once a day aspirin 81 mg oral capsule: 1 cap(s) orally once a day cholecalciferol oral tablet: 2000 unit(s) orally once a day cyclobenzaprine 5 mg oral tablet: 1 tab(s) orally 3 times a day as needed for back pain fluconazole 200 mg oral tablet: 1 tab(s) orally once a day furosemide 40 mg oral tablet: 1 tab(s) orally once a day gabapentin 100 mg oral capsule: 3 cap(s) orally 3 times a day metoprolol succinate 50 mg oral tablet, extended release: 3 tab(s) orally once a day omeprazole 20 mg oral delayed release tablet: 1 tab(s) orally once a day Plavix 75 mg oral tablet: 1 tab(s) orally once a day rosuvastatin 10 mg oral tablet: 1 tab(s) orally once a day sertraline 25 mg oral tablet: 1 tab(s) orally once a day (at bedtime)   11/15  Patient with wounds to the toes. Recommend ER admission (patient refusing). Recommend LE arterial duplex and CHER this week. Podiatry evaluation this week.  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 Since last visit he was hospitalized for new onset Afib and anemia, course complicated acute gout flare, neutropenic fever, transaminitis, and JUAN RAMON on CKD.  Since his discharge from the hospital, he has had two vagal episodes with Afib RVR which is now being controlled by Metoprolol.  On Procrit for anemia.  Currently denies C/P or SOB, at rest or on ambulation.  Denies recent LOC. Reports history of LE edema which is improved with elevation. No lower extremity wounds. Uses a walker for ambulation.  Zio patch 2/17 showed: 8 runs SVT, longest 16 beats, frequent PVCs 5.5%, ventricular bigeminy and trigeminy.  LE venous duplex in 2/2023 showed no DVT.  TTE in 1/2023 showed: 1. Mitral annular calcification, otherwise normal mitral valve. Mild-moderate mitral regurgitation. 2. Calcified trileaflet aortic valve with decreased opening. Peak transaortic valve gradient equals 29 mm Hg, mean transaortic valve gradient equals 20 mm Hg, estimated aortic valve area equals 1.4 sqcm (by continuity equation), aortic valve velocity time integral equals 59 cm, consistent with moderate aortic stenosis. Minimal aortic regurgitation.  3. Normal left ventricular internal dimensions and wall thicknesses. 4. Normal left ventricular systolic function. No segmental wall motion abnormalities. 5. Normal right ventricular size and function.  Last Cr. 1.8 on 10/2 (improved from prior) Last Hgb 10.1  Reports portacath being placed tomorrow for treatment of MDS.  Medications: Patient unaware to verify medications   2/27/23  Daughter called and noted patient with a second syncopal episode while on the toilet. No head injury. Currently VSS. Currently asymptomatic. Recommend ER admission.   2/17/2023 He was having a blood transfusion with hematology end of jan Was noted to have a fast heart rate Was admitted to [facility], for 1 week Had 3-4 blood transfusions there. ? afib  Meds: Allopurinol calcitriol coreg 6.25mg BID Cilostazol 100mg BID Colchicine 0.6 Folic acid Protonix senna   Aspirin and plavix stopped norvasc stopped  crestor stopped   4/6/2022 Recent admission - vertigo  Labs with Dr. Reddy - Creatinine is  2.18, Sodium 140 Hemoglobin was 8 *** (was anemic in hospital as well) BNP was 772 Echo Dec 2021:  mild AS.  normal LV function .  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 Has a 24 hour aid name is Trinity. Balance is ok, walks with a walker No falls Eating ok, no more vomiting  No wounds on the feet or toes.  No blood in the stool or the urine. He does not take iron he remains on aspirin and plavix.   10/6/2021  He is going for accupuncture it is helping somewhat Seen by Dr. Medel - he has spinal dz L3-S1 Undergoing accupunture no PT - he declined.  Otherwise he has no chest pain Breathing ok . Diuretic every other day, helps with leg swelling no wounds on the feet or toes.  Mild edema of the legs  Labs :  hemoglobin 11.1 (previously 9.9) Creatinine 2.10 (previously 2.5)     6/30/2021 Complains of calf pain with walking R hip pains Pins and needs bottom of right foot most recent creatinine is 2.39 He remains on cilostazol 50mg BID He remains on plavix BP is well controlled The left leg is not as bad as the right Complains of some ankle pains No open wounds or tissue loss on the feet.  No chest pain  no chest pressure.    3/31/2021  He had R Radial cath  of the RCA otherwise 60% stenosis No chest pain  no angina he has mild to mod claudcation both legs, no rest pain.  not really active.  Will medically managae  Seen by Mohsen Cabral nephrology, all stable.    Medications: Amlodipine 5 mg Aspirin 81 Calcitriol  Coreg 2 5mg BID Plavix 75 mg daily  Colchicine 0.6 mg daily for gout.   Crestor 10 mg daily  Lasix 40mg every other day     BP at home is 160-170 at home.  His BP here is 148/75  He complains of leg pains, both, entire legs, with walking.     Furosemide 40 mg every other day    1/15/2021 Discussed arterial duplex results with patient, diffuse disease noted. [place] Class 3 claudication. Also reporting bilateral healed / healing sores. Plans for labs and office visit next week. Decision if to proceed with peripheral angiogram on office visit.  Call office if any worsening symptoms occurring.   Here for followup  12/2/2020  Had followed up with nephrology Dr. Cabral 2 weeks ago All good.  Had labwork done, states all is ok Needs tingling of the right leg, bottom of the foot Hard to move it  sometimes Not worse with walking he gets tired with walking.  The right is worse than the left.    Urinating no problems

## 2024-09-04 NOTE — REASON FOR VISIT
[Follow-Up - Clinic] : a clinic follow-up of [FreeTextEntry2] : PAD [FreeTextEntry1] : 9/4/2024  Since last visit patient reports no C/P or SOB. No wounds on the feet, all healed. Bilateral LE edema. Still on chemotherapy. R PICC line. Remains on Lasix.   TTE 6/2024:  1. 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  2. Left ventricular cavity is small. Left ventricular wall thickness is normal. Left ventricular systolic function is severely decreased. Regional wall motion abnormalities present.  3. Moderate to severe aortic stenosis.  4. There is moderate (grade 2) left ventricular diastolic dysfunction, with elevated filling pressure.  5. Normal right ventricular cavity size, with normal wall thickness, and reduced systolic function.  6. No pericardial effusion seen.  7. Compared to the transthoracic echocardiogram performed on 2/9/2024, The EF is slightly lower.. Findings were discussed with Dr. Ahmet Zhang on 6/11/2024.  8. Multiple segmental abnormalities exist.  5/24  Since last visit patient reports R medial foot near posterior heel with wound (reports began 2-3 weeks ago)  L 4th digit wound.  Healed R 2nd to 4th digit eschar. Healed L 3rd digit wound.    R PICC line for MDS treatment.   LE arterial duplex 4/2024: *  Significant disease below the knees with bilateral monophasic and tardus parvus waveforms. *  Collateral vessel formation is noted suggesting nondocumented occlusive disease.  Medications: Crestor 10 mg daily Gabapentin Omeprazole Acyclovir Norvasc 5 mg daily Furosemide 40 mg daily Allopurinol ASA  Plavix   3/27  Since last visit patient reports injured right shin last weekend.  Also has eschar to R 2-4 digits, and L 3rd digit. Denies fever / chills. LE arterial duplex ordered last visit, was not performed.   TTE 2/2024  1. Left ventricular wall thickness is normal. Left ventricular systolic function is mildly decreased with an ejection fraction of 46 % by Andersen's method of disks. Regional wall motion abnormalities present.  2. Basal and mid inferior wall, basal and mid inferolateral wall, and basal anterolateral segment are abnormal.  3. There is calcification of the aortic valve leaflets. There is moderate aortic stenosis. The peak transaortic velocity is 2.30 m/s, peak transaortic gradient is 21.2 mmHg and mean transaortic gradient is 12.0 mmHg with an LVOT/aortic valve VTI ratio of 0.31. The aortic valve area is estimated at 1.06 cm by the continuity equation. There is mild aortic regurgitation.  4. There is mild (grade 1) left ventricular diastolic dysfunction, with normal filling pressure.  5. Normal right ventricular cavity size, wall thickness, and normal systolic function.  6. Mild mitral regurgitation.  7. No pericardial effusion seen.  8. Compared to the transthoracic echocardiogram performed on 10/3/2022, The aortic stenosis is now moderate.  3/19 Patient reports feeling improved. Reports residual LE edema. Continue Lasix daily. Office visit next week. Will check labs during that visit.  3/6 BNP elevated. Last CXR with pulmonary congestion noted. Currently reports taking Lasix 40 mg three times weekly. Increase Lasix 40 mg daily.  12/13  Patient currently without C/P or SOB. No LE edema. R hallux and 3rd digit wounds. L hallux wound.   Cr. improved to 1.8. Platelet count currently 150. Last Hgb above 10.  Hospital Course 11/20-11/27  Patient admitted for acute hypoxic respiratory failure due to decompensated heart failure. Labs remarkable for pancytopenia (QBC 1.63, Hgb 8.1, Plt 100) and proBNP 6700. TTE showed newly reduced EF of 40%, moderate LV diastolic dysfunction, moderate AS, normal RV size and function, basal and mid anterolateral wall, basal and mid inferior wall, basal and mid inferolateral wall, and basal anteroseptal segment are abnormal. CTA C/A/P showed small bilateral pleural effusions with mild interlobular septal thickening which may reflect mild pulmonary interstitial edema and compressive bilateral lower lobe atelectasis  Patient initially required BIPAP for increased work of breathing and tachypnea. He was started on IV diuretics and was eventually weaned to room air. Cardiology consulted for ischemic evaluation due to newly reduced EF seen on echocardiogram. Patient developed JUAN RAMON on CKD so LHC was deferred. JUAN RAMON thought to be secondary to contrast induced nephropathy. Kidney and bladder US showed atrophic right kidney, no hydronephrosis. On discharge, serum creatinine was 2.24.  Patient had intermittent episodes of atrial tachycardia while hospitalized. Home metoprolol succinate was increased to 150 mg XL.  Heme was consulted for pancytopenia and recommended no inpatient chemo. Patient received 1 unit of pRBCs to maintain Hgb>8. He showed no signs or symptoms of bleeding.  PT evaluated patient and recommended NERI. Patient declined NERI and is being discharged home with home PT.   Medication Changes: -Increased metoprolol succinate to 150 mg XL daily. -Stopped cilostazol due to black box warning for heart failure.   Medications:  acyclovir 400 mg oral tablet: 1 tab(s) orally 2 times a day allopurinol 100 mg oral tablet: 1 tab(s) orally once a day amLODIPine 5 mg oral tablet: 1 tab(s) orally once a day aspirin 81 mg oral capsule: 1 cap(s) orally once a day cholecalciferol oral tablet: 2000 unit(s) orally once a day cyclobenzaprine 5 mg oral tablet: 1 tab(s) orally 3 times a day as needed for back pain fluconazole 200 mg oral tablet: 1 tab(s) orally once a day furosemide 40 mg oral tablet: 1 tab(s) orally once a day gabapentin 100 mg oral capsule: 3 cap(s) orally 3 times a day metoprolol succinate 50 mg oral tablet, extended release: 3 tab(s) orally once a day omeprazole 20 mg oral delayed release tablet: 1 tab(s) orally once a day Plavix 75 mg oral tablet: 1 tab(s) orally once a day rosuvastatin 10 mg oral tablet: 1 tab(s) orally once a day sertraline 25 mg oral tablet: 1 tab(s) orally once a day (at bedtime)   11/15  Patient with wounds to the toes. Recommend ER admission (patient refusing). Recommend LE arterial duplex and CHER this week. Podiatry evaluation this week.  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 Since last visit he was hospitalized for new onset Afib and anemia, course complicated acute gout flare, neutropenic fever, transaminitis, and JUAN RAMON on CKD.  Since his discharge from the hospital, he has had two vagal episodes with Afib RVR which is now being controlled by Metoprolol.  On Procrit for anemia.  Currently denies C/P or SOB, at rest or on ambulation.  Denies recent LOC. Reports history of LE edema which is improved with elevation. No lower extremity wounds. Uses a walker for ambulation.  Zio patch 2/17 showed: 8 runs SVT, longest 16 beats, frequent PVCs 5.5%, ventricular bigeminy and trigeminy.  LE venous duplex in 2/2023 showed no DVT.  TTE in 1/2023 showed: 1. Mitral annular calcification, otherwise normal mitral valve. Mild-moderate mitral regurgitation. 2. Calcified trileaflet aortic valve with decreased opening. Peak transaortic valve gradient equals 29 mm Hg, mean transaortic valve gradient equals 20 mm Hg, estimated aortic valve area equals 1.4 sqcm (by continuity equation), aortic valve velocity time integral equals 59 cm, consistent with moderate aortic stenosis. Minimal aortic regurgitation.  3. Normal left ventricular internal dimensions and wall thicknesses. 4. Normal left ventricular systolic function. No segmental wall motion abnormalities. 5. Normal right ventricular size and function.  Last Cr. 1.8 on 10/2 (improved from prior) Last Hgb 10.1  Reports portacath being placed tomorrow for treatment of MDS.  Medications: Patient unaware to verify medications   2/27/23  Daughter called and noted patient with a second syncopal episode while on the toilet. No head injury. Currently VSS. Currently asymptomatic. Recommend ER admission.   2/17/2023 He was having a blood transfusion with hematology end of jan Was noted to have a fast heart rate Was admitted to [facility], for 1 week Had 3-4 blood transfusions there. ? afib  Meds: Allopurinol calcitriol coreg 6.25mg BID Cilostazol 100mg BID Colchicine 0.6 Folic acid Protonix senna   Aspirin and plavix stopped norvasc stopped  crestor stopped   4/6/2022 Recent admission - vertigo  Labs with Dr. Reddy - Creatinine is  2.18, Sodium 140 Hemoglobin was 8 *** (was anemic in hospital as well) BNP was 772 Echo Dec 2021:  mild AS.  normal LV function .  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 Has a 24 hour aid name is Trinity. Balance is ok, walks with a walker No falls Eating ok, no more vomiting  No wounds on the feet or toes.  No blood in the stool or the urine. He does not take iron he remains on aspirin and plavix.   10/6/2021  He is going for accupuncture it is helping somewhat Seen by Dr. Medel - he has spinal dz L3-S1 Undergoing accupunture no PT - he declined.  Otherwise he has no chest pain Breathing ok . Diuretic every other day, helps with leg swelling no wounds on the feet or toes.  Mild edema of the legs  Labs :  hemoglobin 11.1 (previously 9.9) Creatinine 2.10 (previously 2.5)     6/30/2021 Complains of calf pain with walking R hip pains Pins and needs bottom of right foot most recent creatinine is 2.39 He remains on cilostazol 50mg BID He remains on plavix BP is well controlled The left leg is not as bad as the right Complains of some ankle pains No open wounds or tissue loss on the feet.  No chest pain  no chest pressure.    3/31/2021  He had R Radial cath  of the RCA otherwise 60% stenosis No chest pain  no angina he has mild to mod claudcation both legs, no rest pain.  not really active.  Will medically managae  Seen by Mohsen Cabral nephrology, all stable.    Medications: Amlodipine 5 mg Aspirin 81 Calcitriol  Coreg 2 5mg BID Plavix 75 mg daily  Colchicine 0.6 mg daily for gout.   Crestor 10 mg daily  Lasix 40mg every other day     BP at home is 160-170 at home.  His BP here is 148/75  He complains of leg pains, both, entire legs, with walking.     Furosemide 40 mg every other day    1/15/2021 Discussed arterial duplex results with patient, diffuse disease noted. [place] Class 3 claudication. Also reporting bilateral healed / healing sores. Plans for labs and office visit next week. Decision if to proceed with peripheral angiogram on office visit.  Call office if any worsening symptoms occurring.   Here for followup  12/2/2020  Had followed up with nephrology Dr. Cabral 2 weeks ago All good.  Had labwork done, states all is ok Needs tingling of the right leg, bottom of the foot Hard to move it  sometimes Not worse with walking he gets tired with walking.  The right is worse than the left.    Urinating no problems

## 2024-09-04 NOTE — PHYSICAL EXAM
[JVD] : no jugular venous distention  [Normal Breath Sounds] : Normal breath sounds [Normal Heart Sounds] : normal heart sounds [General Appearance - Well Developed] : well developed [Normal Appearance] : normal appearance [Well Groomed] : well groomed [General Appearance - Well Nourished] : well nourished [No Deformities] : no deformities [General Appearance - In No Acute Distress] : no acute distress [Normal Oral Mucosa] : normal oral mucosa [No Oral Pallor] : no oral pallor [No Oral Cyanosis] : no oral cyanosis [Normal Jugular Venous A Waves Present] : normal jugular venous A waves present [Normal Jugular Venous V Waves Present] : normal jugular venous V waves present [No Jugular Venous Stearns A Waves] : no jugular venous stearns A waves [Respiration, Rhythm And Depth] : normal respiratory rhythm and effort [Exaggerated Use Of Accessory Muscles For Inspiration] : no accessory muscle use [Auscultation Breath Sounds / Voice Sounds] : lungs were clear to auscultation bilaterally [Heart Rate And Rhythm] : heart rate and rhythm were normal [Heart Sounds] : normal S1 and S2 [Abdomen Soft] : soft [Abdomen Tenderness] : non-tender [] : no hepato-splenomegaly [Abdomen Mass (___ Cm)] : no abdominal mass palpated [Cyanosis, Localized] : no localized cyanosis [Oriented To Time, Place, And Person] : oriented to person, place, and time [Affect] : the affect was normal [Mood] : the mood was normal [No Anxiety] : not feeling anxious [FreeTextEntry1] : see above

## 2024-09-04 NOTE — ASSESSMENT
[FreeTextEntry1] : Assessment: 1. Renal artery stenosis -s/p L RA stent 2. HTN 3. CKD stage 3-4 4. PAD 5. CAD 6. History of Afib 7. MDS 8. h/o Lower extremity wounds - healed 9. H/o admission for HFrEF  Plan: 1. Moderate AS / HF Repeat TTE Continue Lasix  Leg elevation Low salt diet 2. Renal Artery Stenting Continue ASA/Plavix Continue Crestor 3. HTN Continue Norvasc 4. HLD Continue Crestor 5. PAD Appreciate care by Dr. Park.  Healed LE wounds. Avoid trauma to feet.  5. Continue care with Heme. 6. Due to wall motion abnormalities on TTE and reduced EF, will consider LHC later in time, will hold off for now due to CKD. If he needs a vascular procedure, we will reconsider cardiac evaluation. 7. Notify office if progression of lower extremity wounds. 8. Follow-up in 3 months. Call with any questions.   I, Dr. Gabriel Reddy, personally performed the evaluation and management (E/M) services for this established patient who presents today.  That E/M includes conducting the examination, assessing all new/exacerbated conditions, and establishing a new plan of care.  Today, my ACP, Sylvia Rincon, was here to observe my evaluation and management services for this new problem/exacerbated condition to be followed going forward.

## 2024-09-05 ENCOUNTER — RESULT REVIEW (OUTPATIENT)
Age: 84
End: 2024-09-05

## 2024-09-05 ENCOUNTER — APPOINTMENT (OUTPATIENT)
Dept: HEMATOLOGY ONCOLOGY | Facility: CLINIC | Age: 84
End: 2024-09-05

## 2024-09-05 ENCOUNTER — NON-APPOINTMENT (OUTPATIENT)
Age: 84
End: 2024-09-05

## 2024-09-05 ENCOUNTER — APPOINTMENT (OUTPATIENT)
Dept: INFUSION THERAPY | Facility: HOSPITAL | Age: 84
End: 2024-09-05

## 2024-09-05 DIAGNOSIS — Z00.00 ENCOUNTER FOR GENERAL ADULT MEDICAL EXAMINATION W/OUT ABNORMAL FINDINGS: ICD-10-CM

## 2024-09-05 LAB
ALBUMIN SERPL ELPH-MCNC: 4.2 G/DL — SIGNIFICANT CHANGE UP (ref 3.3–5)
ALBUMIN SERPL ELPH-MCNC: 4.4 G/DL
ALP BLD-CCNC: 92 U/L
ALP SERPL-CCNC: 90 U/L — SIGNIFICANT CHANGE UP (ref 40–120)
ALT FLD-CCNC: 13 U/L — SIGNIFICANT CHANGE UP (ref 10–45)
ALT SERPL-CCNC: 18 U/L
ANION GAP SERPL CALC-SCNC: 15 MMOL/L — SIGNIFICANT CHANGE UP (ref 5–17)
ANION GAP SERPL CALC-SCNC: 16 MMOL/L
ANISOCYTOSIS BLD QL: SIGNIFICANT CHANGE UP
AST SERPL-CCNC: 24 U/L
AST SERPL-CCNC: 31 U/L — SIGNIFICANT CHANGE UP (ref 10–40)
BASOPHILS # BLD AUTO: 0.17 K/UL — SIGNIFICANT CHANGE UP (ref 0–0.2)
BASOPHILS NFR BLD AUTO: 5 % — HIGH (ref 0–2)
BILIRUB SERPL-MCNC: 0.4 MG/DL
BILIRUB SERPL-MCNC: 0.4 MG/DL — SIGNIFICANT CHANGE UP (ref 0.2–1.2)
BUN SERPL-MCNC: 43 MG/DL
BUN SERPL-MCNC: 45 MG/DL — HIGH (ref 7–23)
CALCIUM SERPL-MCNC: 8.6 MG/DL
CALCIUM SERPL-MCNC: 9.1 MG/DL — SIGNIFICANT CHANGE UP (ref 8.4–10.5)
CHLORIDE SERPL-SCNC: 100 MMOL/L
CHLORIDE SERPL-SCNC: 101 MMOL/L — SIGNIFICANT CHANGE UP (ref 96–108)
CHOLEST SERPL-MCNC: 133 MG/DL
CO2 SERPL-SCNC: 22 MMOL/L
CO2 SERPL-SCNC: 23 MMOL/L — SIGNIFICANT CHANGE UP (ref 22–31)
CREAT SERPL-MCNC: 2.03 MG/DL — HIGH (ref 0.5–1.3)
CREAT SERPL-MCNC: 2.06 MG/DL
DACRYOCYTES BLD QL SMEAR: SLIGHT — SIGNIFICANT CHANGE UP
EGFR: 31 ML/MIN/1.73M2
EGFR: 32 ML/MIN/1.73M2 — LOW
ELLIPTOCYTES BLD QL SMEAR: SLIGHT — SIGNIFICANT CHANGE UP
EOSINOPHIL # BLD AUTO: 0 K/UL — SIGNIFICANT CHANGE UP (ref 0–0.5)
EOSINOPHIL NFR BLD AUTO: 0 % — SIGNIFICANT CHANGE UP (ref 0–6)
GLUCOSE SERPL-MCNC: 133 MG/DL
GLUCOSE SERPL-MCNC: 152 MG/DL — HIGH (ref 70–99)
HCT VFR BLD CALC: 31.8 % — LOW (ref 39–50)
HDLC SERPL-MCNC: 28 MG/DL
HGB BLD-MCNC: 11.1 G/DL — LOW (ref 13–17)
LDH SERPL L TO P-CCNC: 363 U/L — HIGH (ref 50–242)
LDLC SERPL CALC-MCNC: 46 MG/DL
LYMPHOCYTES # BLD AUTO: 0.97 K/UL — LOW (ref 1–3.3)
LYMPHOCYTES # BLD AUTO: 28 % — SIGNIFICANT CHANGE UP (ref 13–44)
MACROCYTES BLD QL: SIGNIFICANT CHANGE UP
MCHC RBC-ENTMCNC: 34.9 G/DL — SIGNIFICANT CHANGE UP (ref 32–36)
MCHC RBC-ENTMCNC: 39.5 PG — HIGH (ref 27–34)
MCV RBC AUTO: 113.2 FL — HIGH (ref 80–100)
METAMYELOCYTES # FLD: 1 % — HIGH (ref 0–0)
MONOCYTES # BLD AUTO: 0.24 K/UL — SIGNIFICANT CHANGE UP (ref 0–0.9)
MONOCYTES NFR BLD AUTO: 7 % — SIGNIFICANT CHANGE UP (ref 2–14)
MYELOCYTES NFR BLD: 1 % — HIGH (ref 0–0)
NEUTROPHILS # BLD AUTO: 2 K/UL — SIGNIFICANT CHANGE UP (ref 1.8–7.4)
NEUTROPHILS NFR BLD AUTO: 58 % — SIGNIFICANT CHANGE UP (ref 43–77)
NONHDLC SERPL-MCNC: 105 MG/DL
NRBC # BLD: 1 /100 WBCS — HIGH (ref 0–0)
NRBC # BLD: SIGNIFICANT CHANGE UP /100 WBCS (ref 0–0)
PLAT MORPH BLD: NORMAL — SIGNIFICANT CHANGE UP
PLATELET # BLD AUTO: 211 K/UL — SIGNIFICANT CHANGE UP (ref 150–400)
POIKILOCYTOSIS BLD QL AUTO: SIGNIFICANT CHANGE UP
POLYCHROMASIA BLD QL SMEAR: SLIGHT — SIGNIFICANT CHANGE UP
POTASSIUM SERPL-MCNC: 4 MMOL/L — SIGNIFICANT CHANGE UP (ref 3.5–5.3)
POTASSIUM SERPL-SCNC: 4 MMOL/L
POTASSIUM SERPL-SCNC: 4 MMOL/L — SIGNIFICANT CHANGE UP (ref 3.5–5.3)
PROT SERPL-MCNC: 7.2 G/DL
PROT SERPL-MCNC: 7.2 G/DL — SIGNIFICANT CHANGE UP (ref 6–8.3)
RBC # BLD: 2.81 M/UL — LOW (ref 4.2–5.8)
RBC # FLD: 18.9 % — HIGH (ref 10.3–14.5)
RBC BLD AUTO: ABNORMAL
SODIUM SERPL-SCNC: 138 MMOL/L
SODIUM SERPL-SCNC: 139 MMOL/L — SIGNIFICANT CHANGE UP (ref 135–145)
TRIGL SERPL-MCNC: 396 MG/DL
WBC # BLD: 3.45 K/UL — LOW (ref 3.8–10.5)
WBC # FLD AUTO: 3.45 K/UL — LOW (ref 3.8–10.5)

## 2024-09-06 ENCOUNTER — APPOINTMENT (OUTPATIENT)
Dept: INFUSION THERAPY | Facility: HOSPITAL | Age: 84
End: 2024-09-06

## 2024-09-07 ENCOUNTER — APPOINTMENT (OUTPATIENT)
Dept: INFUSION THERAPY | Facility: HOSPITAL | Age: 84
End: 2024-09-07

## 2024-09-09 ENCOUNTER — APPOINTMENT (OUTPATIENT)
Dept: INFUSION THERAPY | Facility: HOSPITAL | Age: 84
End: 2024-09-09

## 2024-09-09 ENCOUNTER — RESULT REVIEW (OUTPATIENT)
Age: 84
End: 2024-09-09

## 2024-09-09 ENCOUNTER — APPOINTMENT (OUTPATIENT)
Dept: HEMATOLOGY ONCOLOGY | Facility: CLINIC | Age: 84
End: 2024-09-09

## 2024-09-09 ENCOUNTER — NON-APPOINTMENT (OUTPATIENT)
Age: 84
End: 2024-09-09

## 2024-09-09 DIAGNOSIS — E86.0 DEHYDRATION: ICD-10-CM

## 2024-09-09 LAB
BASOPHILS # BLD AUTO: 0.12 K/UL — SIGNIFICANT CHANGE UP (ref 0–0.2)
BASOPHILS NFR BLD AUTO: 3.1 % — HIGH (ref 0–2)
EOSINOPHIL # BLD AUTO: 0.18 K/UL — SIGNIFICANT CHANGE UP (ref 0–0.5)
EOSINOPHIL NFR BLD AUTO: 4.6 % — SIGNIFICANT CHANGE UP (ref 0–6)
HCT VFR BLD CALC: 29.2 % — LOW (ref 39–50)
HGB BLD-MCNC: 10.3 G/DL — LOW (ref 13–17)
IMM GRANULOCYTES NFR BLD AUTO: 2.8 % — HIGH (ref 0–0.9)
LYMPHOCYTES # BLD AUTO: 0.95 K/UL — LOW (ref 1–3.3)
LYMPHOCYTES # BLD AUTO: 24.5 % — SIGNIFICANT CHANGE UP (ref 13–44)
MCHC RBC-ENTMCNC: 35.3 G/DL — SIGNIFICANT CHANGE UP (ref 32–36)
MCHC RBC-ENTMCNC: 39.3 PG — HIGH (ref 27–34)
MCV RBC AUTO: 111.5 FL — HIGH (ref 80–100)
MONOCYTES # BLD AUTO: 0.22 K/UL — SIGNIFICANT CHANGE UP (ref 0–0.9)
MONOCYTES NFR BLD AUTO: 5.7 % — SIGNIFICANT CHANGE UP (ref 2–14)
NEUTROPHILS # BLD AUTO: 2.3 K/UL — SIGNIFICANT CHANGE UP (ref 1.8–7.4)
NEUTROPHILS NFR BLD AUTO: 59.3 % — SIGNIFICANT CHANGE UP (ref 43–77)
NRBC # BLD: 0 /100 WBCS — SIGNIFICANT CHANGE UP (ref 0–0)
PLATELET # BLD AUTO: 186 K/UL — SIGNIFICANT CHANGE UP (ref 150–400)
RBC # BLD: 2.62 M/UL — LOW (ref 4.2–5.8)
RBC # FLD: 17.8 % — HIGH (ref 10.3–14.5)
WBC # BLD: 3.88 K/UL — SIGNIFICANT CHANGE UP (ref 3.8–10.5)
WBC # FLD AUTO: 3.88 K/UL — SIGNIFICANT CHANGE UP (ref 3.8–10.5)

## 2024-09-10 ENCOUNTER — RESULT REVIEW (OUTPATIENT)
Age: 84
End: 2024-09-10

## 2024-09-10 ENCOUNTER — APPOINTMENT (OUTPATIENT)
Dept: INFUSION THERAPY | Facility: HOSPITAL | Age: 84
End: 2024-09-10

## 2024-09-10 LAB
ALBUMIN SERPL ELPH-MCNC: 3.9 G/DL — SIGNIFICANT CHANGE UP (ref 3.3–5)
ALP SERPL-CCNC: 86 U/L — SIGNIFICANT CHANGE UP (ref 40–120)
ALT FLD-CCNC: 12 U/L — SIGNIFICANT CHANGE UP (ref 10–45)
ANION GAP SERPL CALC-SCNC: 14 MMOL/L — SIGNIFICANT CHANGE UP (ref 5–17)
AST SERPL-CCNC: 22 U/L — SIGNIFICANT CHANGE UP (ref 10–40)
BASOPHILS # BLD AUTO: 0.09 K/UL — SIGNIFICANT CHANGE UP (ref 0–0.2)
BASOPHILS NFR BLD AUTO: 2.4 % — HIGH (ref 0–2)
BILIRUB SERPL-MCNC: 0.5 MG/DL — SIGNIFICANT CHANGE UP (ref 0.2–1.2)
BUN SERPL-MCNC: 31 MG/DL — HIGH (ref 7–23)
CALCIUM SERPL-MCNC: 8.8 MG/DL — SIGNIFICANT CHANGE UP (ref 8.4–10.5)
CHLORIDE SERPL-SCNC: 102 MMOL/L — SIGNIFICANT CHANGE UP (ref 96–108)
CO2 SERPL-SCNC: 24 MMOL/L — SIGNIFICANT CHANGE UP (ref 22–31)
CREAT SERPL-MCNC: 2.03 MG/DL — HIGH (ref 0.5–1.3)
EGFR: 32 ML/MIN/1.73M2 — LOW
EOSINOPHIL # BLD AUTO: 0.21 K/UL — SIGNIFICANT CHANGE UP (ref 0–0.5)
EOSINOPHIL NFR BLD AUTO: 5.5 % — SIGNIFICANT CHANGE UP (ref 0–6)
GLUCOSE SERPL-MCNC: 158 MG/DL — HIGH (ref 70–99)
HCT VFR BLD CALC: 29.8 % — LOW (ref 39–50)
HGB BLD-MCNC: 10.2 G/DL — LOW (ref 13–17)
IMM GRANULOCYTES NFR BLD AUTO: 4.2 % — HIGH (ref 0–0.9)
LYMPHOCYTES # BLD AUTO: 0.93 K/UL — LOW (ref 1–3.3)
LYMPHOCYTES # BLD AUTO: 24.3 % — SIGNIFICANT CHANGE UP (ref 13–44)
MCHC RBC-ENTMCNC: 34.2 G/DL — SIGNIFICANT CHANGE UP (ref 32–36)
MCHC RBC-ENTMCNC: 38.9 PG — HIGH (ref 27–34)
MCV RBC AUTO: 113.7 FL — HIGH (ref 80–100)
MONOCYTES # BLD AUTO: 0.22 K/UL — SIGNIFICANT CHANGE UP (ref 0–0.9)
MONOCYTES NFR BLD AUTO: 5.8 % — SIGNIFICANT CHANGE UP (ref 2–14)
NEUTROPHILS # BLD AUTO: 2.21 K/UL — SIGNIFICANT CHANGE UP (ref 1.8–7.4)
NEUTROPHILS NFR BLD AUTO: 57.8 % — SIGNIFICANT CHANGE UP (ref 43–77)
NRBC # BLD: 0 /100 WBCS — SIGNIFICANT CHANGE UP (ref 0–0)
PLATELET # BLD AUTO: 196 K/UL — SIGNIFICANT CHANGE UP (ref 150–400)
POTASSIUM SERPL-MCNC: 3.9 MMOL/L — SIGNIFICANT CHANGE UP (ref 3.5–5.3)
POTASSIUM SERPL-SCNC: 3.9 MMOL/L — SIGNIFICANT CHANGE UP (ref 3.5–5.3)
PROT SERPL-MCNC: 6.9 G/DL — SIGNIFICANT CHANGE UP (ref 6–8.3)
RBC # BLD: 2.62 M/UL — LOW (ref 4.2–5.8)
RBC # FLD: 17.9 % — HIGH (ref 10.3–14.5)
SODIUM SERPL-SCNC: 140 MMOL/L — SIGNIFICANT CHANGE UP (ref 135–145)
WBC # BLD: 3.82 K/UL — SIGNIFICANT CHANGE UP (ref 3.8–10.5)
WBC # FLD AUTO: 3.82 K/UL — SIGNIFICANT CHANGE UP (ref 3.8–10.5)

## 2024-09-12 ENCOUNTER — APPOINTMENT (OUTPATIENT)
Dept: VASCULAR SURGERY | Facility: CLINIC | Age: 84
End: 2024-09-12
Payer: MEDICARE

## 2024-09-12 VITALS
BODY MASS INDEX: 28.88 KG/M2 | HEART RATE: 80 BPM | SYSTOLIC BLOOD PRESSURE: 164 MMHG | TEMPERATURE: 98 F | HEIGHT: 67 IN | WEIGHT: 184 LBS | DIASTOLIC BLOOD PRESSURE: 92 MMHG

## 2024-09-12 DIAGNOSIS — I73.9 PERIPHERAL VASCULAR DISEASE, UNSPECIFIED: ICD-10-CM

## 2024-09-12 PROCEDURE — 99214 OFFICE O/P EST MOD 30 MIN: CPT

## 2024-09-12 PROCEDURE — 93922 UPR/L XTREMITY ART 2 LEVELS: CPT

## 2024-09-12 NOTE — ASSESSMENT
[FreeTextEntry1] : 84-year-old with complex past medical history with left lower extremity swelling and toe ulcers.   Given patient complex past medical history I recommended to continue medical management with leg elevation, antiplatelet therapy.  Will do close follow-up.  Patient to follow-up in 6 months.  Bilateral lower extreme peripheral arterial disease Continue to play therapy Bilateral lower extremity swelling Leg elevation

## 2024-09-12 NOTE — HISTORY OF PRESENT ILLNESS
[FreeTextEntry1] : Patient with complex past medical history as listed above presented for left lower extremity peripheral arterial disease examination.  He has a left lower extremity swelling and also nonhealing toe ulcers.  Past medical history as listed.   The swelling and the toe ulcer improved and currently does not have any active wounds.  He lives at home and able to use walker.  He has caregiver for his medical needs.  9/12/2024: 84-year-old male with complex history and peripheral arterial disease.  His bilateral lower extremity toe ulcers are improved.  He has bilateral lower extremity swelling still.

## 2024-09-17 ENCOUNTER — RX RENEWAL (OUTPATIENT)
Age: 84
End: 2024-09-17

## 2024-09-17 ENCOUNTER — LABORATORY RESULT (OUTPATIENT)
Age: 84
End: 2024-09-17

## 2024-09-19 ENCOUNTER — OUTPATIENT (OUTPATIENT)
Dept: OUTPATIENT SERVICES | Facility: HOSPITAL | Age: 84
LOS: 1 days | Discharge: HOME | End: 2024-09-19

## 2024-09-19 DIAGNOSIS — Z98.890 OTHER SPECIFIED POSTPROCEDURAL STATES: Chronic | ICD-10-CM

## 2024-09-19 DIAGNOSIS — D46.9 MYELODYSPLASTIC SYNDROME, UNSPECIFIED: ICD-10-CM

## 2024-09-19 DIAGNOSIS — Z86.79 PERSONAL HISTORY OF OTHER DISEASES OF THE CIRCULATORY SYSTEM: Chronic | ICD-10-CM

## 2024-09-19 DIAGNOSIS — Z98.49 CATARACT EXTRACTION STATUS, UNSPECIFIED EYE: Chronic | ICD-10-CM

## 2024-09-19 DIAGNOSIS — D64.9 ANEMIA, UNSPECIFIED: ICD-10-CM

## 2024-09-19 DIAGNOSIS — Z90.79 ACQUIRED ABSENCE OF OTHER GENITAL ORGAN(S): Chronic | ICD-10-CM

## 2024-09-25 ENCOUNTER — APPOINTMENT (OUTPATIENT)
Dept: CARDIOLOGY | Facility: CLINIC | Age: 84
End: 2024-09-25
Payer: MEDICARE

## 2024-09-25 PROCEDURE — 93306 TTE W/DOPPLER COMPLETE: CPT

## 2024-09-26 ENCOUNTER — RESULT REVIEW (OUTPATIENT)
Age: 84
End: 2024-09-26

## 2024-09-26 ENCOUNTER — APPOINTMENT (OUTPATIENT)
Dept: HEMATOLOGY ONCOLOGY | Facility: CLINIC | Age: 84
End: 2024-09-26
Payer: MEDICARE

## 2024-09-26 VITALS
BODY MASS INDEX: 29.04 KG/M2 | HEART RATE: 88 BPM | RESPIRATION RATE: 16 BRPM | SYSTOLIC BLOOD PRESSURE: 136 MMHG | OXYGEN SATURATION: 97 % | TEMPERATURE: 97.2 F | WEIGHT: 185.41 LBS | DIASTOLIC BLOOD PRESSURE: 83 MMHG

## 2024-09-26 DIAGNOSIS — Z51.11 ENCOUNTER FOR ANTINEOPLASTIC CHEMOTHERAPY: ICD-10-CM

## 2024-09-26 DIAGNOSIS — D64.9 ANEMIA, UNSPECIFIED: ICD-10-CM

## 2024-09-26 DIAGNOSIS — D53.9 NUTRITIONAL ANEMIA, UNSPECIFIED: ICD-10-CM

## 2024-09-26 DIAGNOSIS — I48.91 UNSPECIFIED ATRIAL FIBRILLATION: ICD-10-CM

## 2024-09-26 DIAGNOSIS — D46.C MYELODYSPLASTIC SYNDROME WITH ISOLATED DEL(5Q) CHROMOSOMAL ABNORMALITY: ICD-10-CM

## 2024-09-26 DIAGNOSIS — D61.818 OTHER PANCYTOPENIA: ICD-10-CM

## 2024-09-26 DIAGNOSIS — N18.32 CHRONIC KIDNEY DISEASE, STAGE 3B: ICD-10-CM

## 2024-09-26 LAB
BASOPHILS # BLD AUTO: 0.02 K/UL — SIGNIFICANT CHANGE UP (ref 0–0.2)
BASOPHILS NFR BLD AUTO: 0.7 % — SIGNIFICANT CHANGE UP (ref 0–2)
EOSINOPHIL # BLD AUTO: 0.09 K/UL — SIGNIFICANT CHANGE UP (ref 0–0.5)
EOSINOPHIL NFR BLD AUTO: 3.2 % — SIGNIFICANT CHANGE UP (ref 0–6)
HCT VFR BLD CALC: 27.8 % — LOW (ref 39–50)
HGB BLD-MCNC: 9.8 G/DL — LOW (ref 13–17)
IMM GRANULOCYTES NFR BLD AUTO: 2.1 % — HIGH (ref 0–0.9)
LYMPHOCYTES # BLD AUTO: 1.28 K/UL — SIGNIFICANT CHANGE UP (ref 1–3.3)
LYMPHOCYTES # BLD AUTO: 45.1 % — HIGH (ref 13–44)
MCHC RBC-ENTMCNC: 35.3 G/DL — SIGNIFICANT CHANGE UP (ref 32–36)
MCHC RBC-ENTMCNC: 40 PG — HIGH (ref 27–34)
MCV RBC AUTO: 113.5 FL — HIGH (ref 80–100)
MONOCYTES # BLD AUTO: 0.24 K/UL — SIGNIFICANT CHANGE UP (ref 0–0.9)
MONOCYTES NFR BLD AUTO: 8.5 % — SIGNIFICANT CHANGE UP (ref 2–14)
NEUTROPHILS # BLD AUTO: 1.15 K/UL — LOW (ref 1.8–7.4)
NEUTROPHILS NFR BLD AUTO: 40.4 % — LOW (ref 43–77)
NRBC # BLD: 0 /100 WBCS — SIGNIFICANT CHANGE UP (ref 0–0)
PLATELET # BLD AUTO: 131 K/UL — LOW (ref 150–400)
RBC # BLD: 2.45 M/UL — LOW (ref 4.2–5.8)
RBC # FLD: 19.4 % — HIGH (ref 10.3–14.5)
WBC # BLD: 2.84 K/UL — LOW (ref 3.8–10.5)
WBC # FLD AUTO: 2.84 K/UL — LOW (ref 3.8–10.5)

## 2024-09-26 PROCEDURE — 99214 OFFICE O/P EST MOD 30 MIN: CPT

## 2024-09-26 NOTE — PHYSICAL EXAM
[Ambulatory and capable of all self care but unable to carry out any work activities] : Status 2- Ambulatory and capable of all self care but unable to carry out any work activities. Up and about more than 50% of waking hours [Normal] : affect appropriate [de-identified] : seen in wheelchair [de-identified] : Mild conjunctival pallor.  [de-identified] : afib rate controlled  [de-identified] : ambulates w/ walker

## 2024-09-26 NOTE — RESULTS/DATA
[FreeTextEntry1] : 9/26/2024 WBC 2.84 ANC 1.15 HGB 9.8    Last  (9/5/2024). Today's LDH pending  8/28/2024 WBC 2.38 Hgb 9.6 Platelet 132   //2/2024  HGB 9.7 WBC 1.81    6/28/2024 Labs from today pending; Hb 10.3->11.6 on June 7th and 10th. While hospitalized, Hb ~11 with MCV in 110s  MRCP IMPRESSION (06/2024): No choledocholithiasis.  Cholelithiasis with nonspecific edematous thickening of the gallbladder wall.  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 Follow-up MR/MRCP of contrast in 2 years is  recommended to assess for stability.    5/30/2024 WBC 2.62 Hgb 10.3 Platelet 156k  4/46/2024 Most recent labs from 4/22/2024 HGB 10.5 <10.2 <...11.1 WBC 4.26  < 180   CMP form  WNL except Cr 1.73   4/8/2024 WBC 3.69 Hgb 11.4 Platelet 301 Normal LDH CMP w/ Cr 1.9, eGFR 35   3/13/2024 WBC 3.05 Hgb 11.6 Platelets 265 Normal LDH and UA Cr 1.99, eGFR 33  2/12/2024  HGB 11.3   WBC 3.19  BONE MARROW  	 Patient:     MASOOD MACARIO   Accession:                             [id number]  Collected Date/Time:                   2/2/2024 11:08 EST Received Date/Time:                    2/3/2024 11:15 EST  Hematopathology Report - Auth (Verified)  Specimen(s) Submitted 1. Bone marrow biopsy MH 2. Bone marrow aspirate for Flow OP  Final Diagnosis 1, 2. Bone marrow biopsy and bone marrow aspirate      - Cellular bone marrow with   erythroid predominant  trilineage  hematopoiesis, reduced  myelopoiesis and  adequate  megakaryopoiesis See note and description.  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  Current biopsy shows cellular bone marrow with    erythroid predominant  trilineage  hematopoiesis, reduced  myelopoiesis and adequate  megakaryopoiesis  with few small  hypolobated forms. Aspirate smears show  dysplastic  features in erythroid  elements  including nuclear irregularities, budding and   megloblastoid  changes.  Suggest correlation with pending   cytogenetics , FISH and myeloid  NGS panel. Comprehensive report with results of pending ancillary studies to follow.  Ancillary studies Bone marrow aspirate iron stain:   Iron stores are increased; ring  sideroblasts are not increased. Flow  cytometry ([id number]):     -  The lymphocyte   immunophenotypic findings show no diagnostic abnormalities.     -  Myeloblasts (0.65% of cells), positive for   myeloperoxidase , HLA-DR, CD34, , CD33, CD13, partial dim CD7; negative for CD15, CD16, CD64, CD2, CD4.      - The  myeloid immunophenotypic  findings show normal  myeloid granularity, no increase in   myeloid immaturity, and normal  myeloid antigen maturation pattern, and the presence of    hematogones (which are reported to be low in   myelodysplasia). Immunohistochemical  stains:  Immunostains CD34, ,  Myeloperoxidase , CD15, CD71, E- cadherin , CD61, Factor VIII, p53 are performed on block 1A. CD34 stains less than 1% cells.  stains increased scattered mast cells.  Myeloperoxidase , CD15 highlight  myeloid elements. CD71 stains many clusters of   erythroid elements and show  erythroid predominance. E- cadherin  stains few pronormoblasts . CD61, factor VIII stains  megakaryocytes  and highlight small  hypolobated forms. p53 (dim) stains few scattered cells. Cytogenetics : Pending FISH:  Pending  Microscopic description: 1. Biopsy:  Sections of bone marrow biopsy and bone marrow fragments in clot show variable  cellularity  (5% -50% cellularity ) in a fragmented and hemorrhagic core biopsy,   erythroid predominant  trilineage hematopoiesis with maturation, reduced   myelopoiesis. M:E ratio is reduced.  Megakaryocytes  are normal in number with occasional small  hypolobated forms. Iron stores are increased.  2. Aspirate:  Spicular  and cellular; adequate for interpretation. Maturing and mature  myeloid and  erythroid elements are present.  Erythroid  elements are markedly increased.   Erythroid elements show progressive maturation with  dysplastic features including nuclear irregularities, budding and  megaloblastoid changes. M:E ratio (0.3:1) is reduced.    Megakaryocytes appear normal in number and occasional small forms are present. Many scattered mast cells are present.  	 Flow Cytometry Final Report ________________________________________________________________________ Specimen: Bone marrow aspirate Date Collected: 02/02/2024 Date Received: 02/02/2024 Date Processed: 02/02/2024 Date Reported: 02/07/2024 16:45 Accession #: [id number] ________________________________________________________________________ CLINICAL DATA: Clinical history of myelodysplastic syndrome, rule out acute myeloid leukemia  ________________________________________________________________________ CD45/Side Scatter Differential Granulocytes: 45 % ;    Lymphocytes: 29 % ;    Monocytes: 4 % ;    Dim CD45 and/or blast gate: 5 % ;    Erythroid/debris: 15 % ________________________________________________________________________ DIAGNOSIS: Bone marrow aspirate:     -  The lymphocyte immunophenotypic findings show no diagnostic abnormalities.     - Myeloblasts (0.65% of cells), positive for myeloperoxidase, HLA-DR, CD34, , CD33, CD13, partial dim CD7; negative for CD15, CD16, CD64, CD2, CD4.      - The myeloid immunophenotypic findings show normal myeloid granularity, no increase in myeloid immaturity, and normal myeloid antigen maturation pattern, and the presence of hematogones (which are reported to be low in myelodysplasia). Please see interpretation.  INTERPRETATION: MORPHOLOGY: Maturing trilineage hematopoiesis CYTOSPIN: Maturing and mature myeloid elements with no significant lymphocytosis or immaturity; pronormoblasts present.  IMMUNOPHENOTYPE: Lymphocytes (29% of cells): Heterogeneous population of T-cells (with normal CD4 to CD8 ratio, including CD57+ natural killer-like T-cells, gamma/delta T-cells), natural killer cells, and polytypic B-cells. The lymphocyte immunophenotypic findings show no diagnostic abnormalities.  Myeloblasts (0.65% of cells), positive for myeloperoxidase, HLA-DR, CD34, , CD33, CD13, partial dim, CD7; negative for CD15, CD16, CD64, CD2, CD4.  CD45/side scatter shows 0.65% myeloblast population and normal myeloid granularity. There is no increase in CD34, CD14/CD64 or  positive cells. Myeloid antigen pattern is normal with CD13, CD16, CD11b, CD15, and CD33. Granulocytes express CD56.  Hematogones are present. The myeloid immunophenotypic findings show normal myeloid granularity, no increase in myeloid immaturity, and normal myeloid antigen maturation pattern, and the presence of hematogones (which are reported to be low in myelodysplasia). _____________________________________________________________________  PATHOLOGY: MOLECULAR   [facility] Advanced NGS Myeloid Panel Final Report  RESULT SUMMARY: ABNORMAL  DETECTED GENOMIC ALTERATIONS:   Tier I: Variants of Strong Clinical Significance   SF3B1 p.[id number]   Tier II: Variants of Potential Clinical Significance   DNMT3A p.?   Tier III: Variants of Unknown Clinical Significance   KIT p.[id number]   12/7/2023 HGB 10.4 WBC 3.47    9/18/2023  (Before and after stopping Revlimid)  WBC 1.14 > 6.08 Hgb 7.3  > 8.8  > 210  8/9/2023 Most recent blood work from 8/9/2023 WBC 1.14  Hgb 7.3     7/2/2023 Hgb 9.9, .4 WBC platelet normal  Cr 2.3 eGFR 28  Hypokalemic on 6/28 3.3  5/22/2023 WBC and platelet WNL Hgb 11.1  .9 CMP from 5/4-  Cr 1.79, eGFR 38 Pending CMP LDH UA today  3/24/2023 WBC 6.28 Hgb 10.2 .7 Platelet 224 Last Cr (2/24/23) - 2.42 Pending repeat CBC, CMP, LDH, UA  2/24/2023 WBC 2.62 Hgb 8.8 Platelet 128 Pending CMP, LDH, UA  1/26/2023 Last Hgb 9.1, Cr 2.3 (12/16/2022)    12/16/2022 HGB on 10/31/2022; 8.3,  Bone marrow biopsy:  Patient:   MASOOD MACARIO   Accession:                             [id number]  Collected Date/Time:                   10/31/2022 16:49 EDT Received Date/Time:                    10/31/2022 16:50 EDT  Hematopathology Addendum Report - Auth (Verified)  Hematopathology Addendum Additional immunohistochemical stains (block 1A: p53, ) show  increased  positive interstitial mast cells without aggregates. TP53  shows less than 1% bright positive cells.  There is no change in the diagnosis.  Verified by: Brooklynn Erazo (Electronic Signature) Reported on: 11/17/22 09:24 [facility], [facility], [address] [place] [address] Phone: [phone]   Fax: [phone] _________________________________________________________________  Disclaimer Slide(s) with built in immunohistochemical study control(s) and negative  control associated with this case has/have been verified by the sign out  pathologist.  For slide(s) without built in controls positive control slides has/have  been reviewed and approved by immunohistochemistry lab  These immunohistochemical/ in-situ hybridization tests have been developed  and their performance characteristics determined by [facility], Department of Pathology, Division of Immunopathology,  [id number] [place] [address].  It has not been cleared  or approved by the U.S. Food and Drug Administration.  The FDA has  determined that such clearance or approval is not necessary.  This test  is used for clinical purposes.  The laboratory is certified under the  CLIA-88 as qualified to perform high complexity clinical testing. Hematopathology Addendum Report - Auth (Verified)  Hematopathology Addendum       Comprehensive Hematopathology Report  Final Diagnosis : 1, 2. 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 The bone marrow  shows hypercellularity with erythroid hyperplasia and increased ring  sideroblasts and dysplastic megakaryocytosis. Interstitial mast cells  are increased with normal morphology, few CD25 positive, negative CD30.  The findings show myelodysplastic syndrome with multilineage dysplasia  (hypolobulated megakaryocytes) and ring sideroblasts. Correlation with  cytogenetics, FISH, and somatic mutation analysis to evaluate for complex  karyotype, del(5q), -7, del(7q) SF3B1, TP53, other abnormalities is  done. Please note findings of an   abnormal male karyotype, 46,XY,del(5)(q15q33) [13]/46,XY[7],  a positive MDS FISH panel and [facility]  Myeloid Report showing   Tier I: Variants of Strong Clinical Significance:  SF3B1 p.Kot708Vsc (VAF: 39%),   Tier II: Variants of Potential Clinical   Significance: DNMT3A p.?  (VAF: 4%), Tier III: Variants of Unknown  Clinical Significance:   KIT p.[id number]  (VAF: 50%) . Based on the  additional findings, the MDS classification (ICC) is MDS with del(5q) and  with WHO is MDS with low blasts and 5q deletion.  Dr. Mcmahon was notified of the diagnosis on 11/07/2022.  Morphology: Microscopic description: 1. Biopsy:   Sections of bone marrow biopsy and bone marrow fragments in  clot show hypercellularity (50-85% cellularity), erythroid predominance  with maturation and increased pronormoblasts, maturing and mature  myeloid elements, megakaryocytes at least normal in number with abnormal  morphology (increased hypolobulated/small forms), increased interstitial  mast cells without aggregates, and iron stores increased.  2. Aspirate:   Cellular spicules are present, adequate for interpretation.   Maturing and mature myeloid and erythroid elements are present with  erythroid predominance (M:E ratio (1.16:1).  Megakaryocytes appear at  least normal in number with small/hypolobulated morphology. Mast cells  are increased in the spicules (round and normally granular).  Bone Marrow Aspirate Differential: (100 Cells). Type  % Normal* Blast  0% 0-3 Neutrophil and    Precursors   47% 33-63 Eosinophil  3% 1-5 Basophil  0% 0-1 Pronormoblast  5% 0-2 Normoblast  43% 15-25 Monocyte  0% 0-2 Lymphocyte  7% 10-15 Plasma cell  0% 0-1   *Adult Range  Comment Iron stain (examined to evaluate for iron stores; see microscopic  description) and Giemsa stain (shows appropriate staining pattern) are  performed and evaluated on block(s): 1A, 1B  Ancillary Studies: Bone marrow aspirate iron stain:   Iron stores are increased; numerous ring  sideroblasts are present (greater than 15%).  Flow cytometry:   The myeloid immunophenotypic findings show decreased  myeloid granularity, no increase in myeloid immaturity (myeloblasts,  0.32% of cells, with normal immunophenotype), normal myeloid antigen  maturation pattern, few mast cells, and the presence of hematogones  (which are reported to be low in myelodysplasia). The lymphocyte immunophenotypic findings show no diagnostic abnormalities.  Immunohistochemical stains   (block 1A: CD34, , myeloperoxidase,  CD71, E-cadherin, factor VIII, CD15, CD61, CD25, CD30, p53) show no  increase in CD34 positive cells (less than 3%), erythroid predominance  (positive with CD71), with clusters of pronormoblasts (positive with  E-cadherin); diminished myeloid elements with maturation (positive   with myeloperoxidase and CD15), and increased megakaryocyte number with  dysplastic, small/hypolobulated morphology (positive with factor VIII,  CD61). CD30 is negative in mast cells; a few show dim CD25 positivity.  Cytogenetics:  [id number] Date Collected:  10/31/2022 Result:  Abnormal male karyotype Karyotype: 46,XY,del(5)(q15q33)[13]/46,XY[7]  Fluorescence in situ Hybridization (FISH):    [id number] Result: ABNORMAL FISH MDS PANEL - 5q deletion detected (65%) Probe(s) and Location(s):   Y6Q198/ D5S23 (5p15.2), EGR1 (5q31), D7Z1  (7p11.1-q11.1), L0Q670 (7q31), CEP-8/D8Z2   ? (8p11.1-q11.1), [id number]  (20q12) ISCN Nomenclature:  nuc clif(U6O394/X3G92f7,EGR1x1)[130/200],  (D7Z1,U8L925)x2[200], (D8Z2,G19F360)x2[197/200], (TP53x2)[197/200]  Molecular Analyses: [facility] Myeloid Report Specimen ID:  [id number] Date Collected:  10/31/2022 Specimen Source:  Bone Marrow  RESULT SUMMARY:  ABNORMAL DETECTED GENOMIC ALTERATIONS: Tier I: Variants of Strong Clinical Significance SF3B1 p.Rjf198Rna Tier II: Variants of Potential Clinical Significance DNMT3A p.? Tier III: Variants of Unknown Clinical Significance KIT p.Zyx211Yav  PERTINENT NEGATIVE RESULTS: The following genes are NEGATIVE for clinically relevant mutations.  Mutational hotspots and surrounding exonic regions were interrogated for  DNA level point mutations and indels (fusions not assayed). ABL1, ANKRD26, ASXL1, ATRX, BCOR, BCORL1, BRAF, CALR, CBL, CCND2, CDKN2A,  CEBPA, CSF3R, CUX1, DDX41, ETNK1, ETV6, EZH2, FBXW7, FLT3, GATA2, HRAS,  IDH1, IDH2, JAK2, KDM6A, KMT2A, KRAS, MAP2K1, MPL, MYD88, NF1, NPM1,  NRAS, PDGFRA, PHF6, PTEN, PTPN11, RUNX1, SETBP1, SRSF2, STAG2, TET2,  TP53, U2AF1, WT1, ZRSR2 TECHNICAL SUMMARY : DNMT3A p.? c.2174-1G>A 4% allele frequency Exon 19 [id number]  SF3B1 p.Shi842Smr c.1998G>C 39% allele frequency Exon 14 [id number]_012433.3   KIT p.Jnp797Jsw c.1879C>T 50% allele frequency Exon 12 [id number]_[id number]  Clinical History/Data  : History of macrocytic anemia with CKD rule out primary bone marrow  disorder  CBC, 10/31/2022  Test Code       Result         Reference                  Range WBC             5.75 K/uL      3.80 - 10.50 RBC             2.34 M/uL L    4.20 - 5.80 HGB             8.3 g/dL L     13.0 - 17.0 HCT             25.4 % L       39.0 - 50.0 MCV             108.5 fl H     80.0 - 100.0 MCH             35.5 pg H      27.0 - 34.0 MCHC            32.7 g/dL      32.0 - 36.0 RDW             18.5 % H       10.3 - 14.5 PLT             236 K/uL       150 - 400 Auto NRBC       0 /100 WBCs    0 - 0 NEUT%           63.5 %         43.0 - 77.0  NEUT#           3.65 K/uL      1.80 - 7.40 LYMPH%          20.5 %         13.0 - 44.0 LYMPH#          1.18 K/uL      1.00 - 3.30 MONO%           7.0 %          2.0 - 14.0 MONO#           0.40 K/uL      0.00 - 0.90 EOS%            3.3 %          0.0 - 6.0 EOS#            0.19 K/uL      0.00 - 0.50 BASO%           1.4 %          0.0 - 2.0 BASO#           0.08 K/uL      0.00 - 0.20 BUN             28 mg/dL H     7 - 23 Creatinine      2.32 mg/dL H   0.50 - 1.30  Verified by: Brooklynn Erazo (Electronic Signature) Reported on: 11/09/22 16:40 [facility], [facility], [address] [place] [address] Phone: [phone]   Fax: [phone]  10/27/2022 (Labs on 10/18/2022): Hgb stable at 9.0, normal WBC, platelets Creatinine 2.32 eGFR 28   5/12/2022 Available labs reviewed.  Macrocytic anemia, no etiology could be determined.

## 2024-09-26 NOTE — HISTORY OF PRESENT ILLNESS
[Treatment Protocol] : Treatment Protocol [de-identified] : CHART REVIEW: 81-year-old Mr. MACARIO is seen in consult for macrocytic anemia (Hgb 9.5, MCV ~125). Patient has multiple medical conditions including stage-3 CKD. He has no symptoms of anemia and has no complaints.  [FreeTextEntry1] : Dacogen q28 days. C1: 10/2/23 - 10/6/23. C2: 10/30/23 - 11/3/23. C3:12/11/23 - 12/15/23 (delayed; cancelled). C4: 1/8/24-1/12/24. C5: 2/12/24-2/16/24. C6: 3/11/23-3/15/24. C7: 4/8/24-4/12/24. C8: 5/6-5/10. C9 (delayed by a month d/t fevers): 7/8-7/12. C10: 8/5-8/9.  [de-identified] : 05/12/22: 81 year-old Mr. MACARIO is seen in consult for macrocytic  anemia (Hgb 9.5, MCV ~125). Patient has multiple medical conditions including stage-3 CKD. He has no symptoms of anemia and has no complaints.   10/27/22: Patient presenting today for follow up for macrocytic anemia. Overall feels well. Endorses some fatigue and shortness of breath with activity. Denies headache, dizziness, Ambulates with walker. He is being followed by his nephrologist who manages his hypertension. Endorses taking diuretics every other day.    12/16/2022 Patient returns for a follow up. He endorses no change in his health status. 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 Patient has some symptoms of fatigue and tiredness.   1/26/2023 Patient returns for a follow up. He started taking Revlimid on 12/30/2022. About 2 weeks later he started developing side effects like-- loss  of appetite, body ache, constipation, pins and needle sensation. He also appears to have developed Jaundice.   2/24/2023 Patient seen in follow up, accompanied by Preeti ceja, at bedside. He had a recent hospitalization from 1/27 - 2/7 for new onset afib that was detected in the treatment room prior to his blood transfusion appointment. He was found to be in afib, anemic (Hgb 6.3) requiring 4 PRBCs throughout his stay. His course was c/b acute gout flare, neutropenic fever, transaminitis, and JUAN RAMON on CKD. 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 Had an episode of neutropenic fever started on Cefepime but stopped as per ID, most likely due to gout flare. UA + but asymptomatic, not treated. Bld cx neg x2. Syncopal episode while being on the commode yesterday 2/24. As per Preeti, EKG revealed rate controlled afib. Today, his HR was 127 in office. Denies dizziness, SOB, chest pain, palpitations. As per pt, was on a holter monitor that was submitted yesterday. He is f/u with Dr. Reddy (cards). He also have been having a dry cough for few days. + chills. No fever, body aches, night sweats. No sick contacts. Working with PT. Still feels weak, ambulating via wheelchair. Appetite is okay. Weight is stable.   3/24/2023 Patient seen in follow up. Accompanied by Preeti ceja at bedside. He has been responding well to 20k U Procrit weekly injections. Last Hgb 10.2, BP stable 111/58. Since his last visit, has been evaluated by cards for his afib (now rate controlled) currently on Metoprolol 25mg BID. He reports feeling much better since last encounter - he is now able to ambulate longer distances with a walker. Endorses unsteady gait (vertigo). Appetite is good, weight is stable.   5/22/2023 Patient seen in follow up. Accompanied by aide and daughter at bedside. Had a recent UTI, treated with Amoxicillin. He'll be following up with his PCP tomorrow. He endorses left flank pain that started after his UTI that is hindering him from ambulating. Describes the pain to be "shocking" and sharp in nature, pain reproduced by movements. His last MRI was in 2021 which revealed lumbar spondylosis with mild to moderate spinal canal narrowing. + peripheral neuropathy of his right lower ext. Currently on Gabapentin 200mg daily. His Hgb has been stable since April 27th. Last Hgb 11.1 last week. Otherwise, no complaints. Appetite is good and weight is stable.  7/5/2023 Patient seen in follow up. With aide and daughter at bedside. Interim, he's been feeling well. He completed first month of starting Revlimid without any side effects. His most recent CBC from last week 9.7, he plans on self administering Procrit today after the visit. He had MRI done 6/20 which showed degenerative vs inflammation of his lumbar spine. He continues to experience back pain and some scapular pain from pulled muscle last week. Recommended ortho / pain management for chronic back pain but pt denied at this time. He reports feeling weaker in his lower extremities but does not want to participate in PT at this time. Otherwise, feeling well. Appetite is good, weight is stable.   8/10/2023 Patient presents for a follow up. He appears pale. He feels weak and sleepy after taking the Lenalidomide pill. He is not doing any PT. He is not able to walk by himself yet. Hs anemia has become worse despite EUGENIA. Lenalidomide may not be helping to improve his disease but worsening the pancytopenia.    9/18/2023 Patient seen in follow up. He was taken off Revlimid in his last visit and his EUGENIA dose was increased to 40KU. His HGB increased to ~9g (from 7) and neutropenia and thrombocytopenia resolved. He looks and feels better. We discussed starting HMA.   12/7/2023 Patient presents for a follow up and evaluation for treatment. He completed 2 cycles of HMA (Dacogen). Before the 3rd cycle, he was admitted to the hospital for low O2Sat. He received IV abx for suspected sepsis. He was discharged a week ago. His physical condition has improved. His last transfusion (PRBC) was 3 weeks ago (11/2023). His Hgb is 10.4, platelets 175, WBC 3.47. He is continuing on Retacrit at home. He is holding his counts without transfusions. It appears that he is responding to the treatment.   3/11/2024 Patient seen the tx room for a follow up appt. Today is C6D1. He is accompanied by Preeti, his aide and his son at bedside. Interim, he's been in good health. He is now able to climb up the stairs. He is slowly regaining his strength. He has been giving himself the Procrit despite his Hgb being > 11, we had a lengthy discussion again today going over the parameters for Procrit injections (ONLY ADMINISTER WHEN HGB < 11). The patient, as well as his aide and his son verbalized full understanding  4/8/2024 Patient seen in the tx room for a f/u. Today is C7D1 of Dacogen. He is accompanied by Preeti, his aide. Interim, he's been in good health. He has stopped his Procrit since his Hgb has been > 11. He reports that his legs feel weak and cannot climb the stairs well without falling. He plans to resume PT to regain his strength.  4/26/2024  Patient presents for a follow up for MDS on HMA. He also has CHK (eGFR 39) He has been on Decitabine monthly. He responded well to the treatment. He became transfusion independent, his counts normalized, He is not on EUGENIA or GCSF or TPO-RA. He slowly gained strength and now able to ambulate with a walker. He is going for a vascular surgery evaluation for possible stent in the LE vessels. He has no complaints.   5/29/2024 Patient seen in follow up. Accompanied by his HHA. Interim, he's been in good health. Tolerated his C8 with no issues. Counts have been stable, receiving Procrit for Hgb <11  6/28/2024  Patient seen in follow up for MDS, accompanied by HHA. Recently hospitalized 6/11 for sepsis with Stenotrophomonas, MRSE bacteremia requiring PICC line be pulled and replaced. He completed a course of bactrim as of the June 27th. MRCP completed and significant for choledocholithiasis and 3mm pancreatic cyst, no inpatient intervention required. He endorses 1 R foot blister evaluated by wound care on regular basis without purulent drainage. Denies fever, chills, cough, dyspnea. Still maintains adequate physical status - ambulates with assistance of walker.    8/2/2024 Patient returns for a follow up. He has completed 10 cycles of HMA (Decitabine) and seems to be holding response. He has not needed any transfusion since C2 Tx. He has also taken off EUGENIA at C5. His HGB harbors at 10.x,, normal WBC and platelet count. However, his today's counts have dropped. The patient however has recurrent UTI. He just finished a course of ABX and now having symptoms again.   8/27/2024 Patient seen in follow up. He completed 10 cycles of Decitabine. Interim, he has been in good health. He is gaining weight. He feels well. Reports difficulty with balance and uses walker to ambulate. He was seen by ID recently and was prescribed Doxy for redness at the insertion site. He denies any pain, swelling of the extremity, pus formation. UTI symptoms resolved.  9/26/2024 Patient presents for a follow up for high risk MDS (DX: 10/2022). He failed Ernie and Luspatercept. He was transfusion dependent, was on Procrit. He has been on HMA (Decitabine) for a year now. He has responded well and holding his response. He has become transfusion independent since 06/2024. His physical health is stable and unchanged. He is also on PPx Abx. He is getting PT twice a week. He has no complaints.    *

## 2024-09-27 ENCOUNTER — APPOINTMENT (OUTPATIENT)
Dept: INFECTIOUS DISEASE | Facility: CLINIC | Age: 84
End: 2024-09-27

## 2024-10-07 ENCOUNTER — RESULT REVIEW (OUTPATIENT)
Age: 84
End: 2024-10-07

## 2024-10-07 ENCOUNTER — APPOINTMENT (OUTPATIENT)
Dept: INFUSION THERAPY | Facility: HOSPITAL | Age: 84
End: 2024-10-07

## 2024-10-07 ENCOUNTER — APPOINTMENT (OUTPATIENT)
Dept: HEMATOLOGY ONCOLOGY | Facility: CLINIC | Age: 84
End: 2024-10-07

## 2024-10-07 LAB
ALBUMIN SERPL ELPH-MCNC: 4.1 G/DL — SIGNIFICANT CHANGE UP (ref 3.3–5)
ALP SERPL-CCNC: 118 U/L — SIGNIFICANT CHANGE UP (ref 40–120)
ALT FLD-CCNC: 26 U/L — SIGNIFICANT CHANGE UP (ref 10–45)
ANION GAP SERPL CALC-SCNC: 16 MMOL/L — SIGNIFICANT CHANGE UP (ref 5–17)
AST SERPL-CCNC: 34 U/L — SIGNIFICANT CHANGE UP (ref 10–40)
BASOPHILS # BLD AUTO: 0.16 K/UL — SIGNIFICANT CHANGE UP (ref 0–0.2)
BASOPHILS NFR BLD AUTO: 4 % — HIGH (ref 0–2)
BILIRUB SERPL-MCNC: 0.5 MG/DL — SIGNIFICANT CHANGE UP (ref 0.2–1.2)
BUN SERPL-MCNC: 24 MG/DL — HIGH (ref 7–23)
CALCIUM SERPL-MCNC: 9.4 MG/DL — SIGNIFICANT CHANGE UP (ref 8.4–10.5)
CHLORIDE SERPL-SCNC: 103 MMOL/L — SIGNIFICANT CHANGE UP (ref 96–108)
CO2 SERPL-SCNC: 23 MMOL/L — SIGNIFICANT CHANGE UP (ref 22–31)
CREAT SERPL-MCNC: 2.03 MG/DL — HIGH (ref 0.5–1.3)
EGFR: 32 ML/MIN/1.73M2 — LOW
EOSINOPHIL # BLD AUTO: 0.08 K/UL — SIGNIFICANT CHANGE UP (ref 0–0.5)
EOSINOPHIL NFR BLD AUTO: 2 % — SIGNIFICANT CHANGE UP (ref 0–6)
GLUCOSE SERPL-MCNC: 129 MG/DL — HIGH (ref 70–99)
HCT VFR BLD CALC: 33 % — LOW (ref 39–50)
HGB BLD-MCNC: 11.4 G/DL — LOW (ref 13–17)
IMM GRANULOCYTES NFR BLD AUTO: 4 % — HIGH (ref 0–0.9)
LDH SERPL L TO P-CCNC: 322 U/L — HIGH (ref 50–242)
LYMPHOCYTES # BLD AUTO: 1.37 K/UL — SIGNIFICANT CHANGE UP (ref 1–3.3)
LYMPHOCYTES # BLD AUTO: 34.4 % — SIGNIFICANT CHANGE UP (ref 13–44)
MCHC RBC-ENTMCNC: 34.5 G/DL — SIGNIFICANT CHANGE UP (ref 32–36)
MCHC RBC-ENTMCNC: 38.5 PG — HIGH (ref 27–34)
MCV RBC AUTO: 111.5 FL — HIGH (ref 80–100)
MONOCYTES # BLD AUTO: 0.32 K/UL — SIGNIFICANT CHANGE UP (ref 0–0.9)
MONOCYTES NFR BLD AUTO: 8 % — SIGNIFICANT CHANGE UP (ref 2–14)
NEUTROPHILS # BLD AUTO: 1.89 K/UL — SIGNIFICANT CHANGE UP (ref 1.8–7.4)
NEUTROPHILS NFR BLD AUTO: 47.6 % — SIGNIFICANT CHANGE UP (ref 43–77)
NRBC # BLD: 0 /100 WBCS — SIGNIFICANT CHANGE UP (ref 0–0)
PLATELET # BLD AUTO: 267 K/UL — SIGNIFICANT CHANGE UP (ref 150–400)
POTASSIUM SERPL-MCNC: 3.7 MMOL/L — SIGNIFICANT CHANGE UP (ref 3.5–5.3)
POTASSIUM SERPL-SCNC: 3.7 MMOL/L — SIGNIFICANT CHANGE UP (ref 3.5–5.3)
PROT SERPL-MCNC: 7.3 G/DL — SIGNIFICANT CHANGE UP (ref 6–8.3)
RBC # BLD: 2.96 M/UL — LOW (ref 4.2–5.8)
RBC # FLD: 18.6 % — HIGH (ref 10.3–14.5)
SODIUM SERPL-SCNC: 142 MMOL/L — SIGNIFICANT CHANGE UP (ref 135–145)
WBC # BLD: 3.98 K/UL — SIGNIFICANT CHANGE UP (ref 3.8–10.5)
WBC # FLD AUTO: 3.98 K/UL — SIGNIFICANT CHANGE UP (ref 3.8–10.5)

## 2024-10-08 ENCOUNTER — APPOINTMENT (OUTPATIENT)
Dept: INFUSION THERAPY | Facility: HOSPITAL | Age: 84
End: 2024-10-08

## 2024-10-08 ENCOUNTER — LABORATORY RESULT (OUTPATIENT)
Age: 84
End: 2024-10-08

## 2024-10-08 ENCOUNTER — RESULT REVIEW (OUTPATIENT)
Age: 84
End: 2024-10-08

## 2024-10-08 DIAGNOSIS — R11.2 NAUSEA WITH VOMITING, UNSPECIFIED: ICD-10-CM

## 2024-10-08 DIAGNOSIS — Z51.11 ENCOUNTER FOR ANTINEOPLASTIC CHEMOTHERAPY: ICD-10-CM

## 2024-10-08 LAB
BASOPHILS # BLD AUTO: 0.13 K/UL — SIGNIFICANT CHANGE UP (ref 0–0.2)
BASOPHILS NFR BLD AUTO: 4 % — HIGH (ref 0–2)
EOSINOPHIL # BLD AUTO: 0.08 K/UL — SIGNIFICANT CHANGE UP (ref 0–0.5)
EOSINOPHIL NFR BLD AUTO: 2.4 % — SIGNIFICANT CHANGE UP (ref 0–6)
HCT VFR BLD CALC: 31.9 % — LOW (ref 39–50)
HGB BLD-MCNC: 11 G/DL — LOW (ref 13–17)
IMM GRANULOCYTES NFR BLD AUTO: 3.7 % — HIGH (ref 0–0.9)
LYMPHOCYTES # BLD AUTO: 1.1 K/UL — SIGNIFICANT CHANGE UP (ref 1–3.3)
LYMPHOCYTES # BLD AUTO: 33.5 % — SIGNIFICANT CHANGE UP (ref 13–44)
MCHC RBC-ENTMCNC: 34.5 G/DL — SIGNIFICANT CHANGE UP (ref 32–36)
MCHC RBC-ENTMCNC: 38.9 PG — HIGH (ref 27–34)
MCV RBC AUTO: 112.7 FL — HIGH (ref 80–100)
MONOCYTES # BLD AUTO: 0.27 K/UL — SIGNIFICANT CHANGE UP (ref 0–0.9)
MONOCYTES NFR BLD AUTO: 8.2 % — SIGNIFICANT CHANGE UP (ref 2–14)
NEUTROPHILS # BLD AUTO: 1.58 K/UL — LOW (ref 1.8–7.4)
NEUTROPHILS NFR BLD AUTO: 48.2 % — SIGNIFICANT CHANGE UP (ref 43–77)
NRBC # BLD: 0 /100 WBCS — SIGNIFICANT CHANGE UP (ref 0–0)
PLATELET # BLD AUTO: 257 K/UL — SIGNIFICANT CHANGE UP (ref 150–400)
RBC # BLD: 2.83 M/UL — LOW (ref 4.2–5.8)
RBC # FLD: 18.1 % — HIGH (ref 10.3–14.5)
WBC # BLD: 3.28 K/UL — LOW (ref 3.8–10.5)
WBC # FLD AUTO: 3.28 K/UL — LOW (ref 3.8–10.5)

## 2024-10-09 ENCOUNTER — LABORATORY RESULT (OUTPATIENT)
Age: 84
End: 2024-10-09

## 2024-10-09 ENCOUNTER — APPOINTMENT (OUTPATIENT)
Dept: INFUSION THERAPY | Facility: HOSPITAL | Age: 84
End: 2024-10-09

## 2024-10-10 ENCOUNTER — APPOINTMENT (OUTPATIENT)
Dept: INFUSION THERAPY | Facility: HOSPITAL | Age: 84
End: 2024-10-10

## 2024-10-10 ENCOUNTER — RESULT REVIEW (OUTPATIENT)
Age: 84
End: 2024-10-10

## 2024-10-10 ENCOUNTER — APPOINTMENT (OUTPATIENT)
Dept: HEMATOLOGY ONCOLOGY | Facility: CLINIC | Age: 84
End: 2024-10-10

## 2024-10-10 LAB
BASOPHILS # BLD AUTO: 0.15 K/UL — SIGNIFICANT CHANGE UP (ref 0–0.2)
BASOPHILS NFR BLD AUTO: 3.5 % — HIGH (ref 0–2)
EOSINOPHIL # BLD AUTO: 0.25 K/UL — SIGNIFICANT CHANGE UP (ref 0–0.5)
EOSINOPHIL NFR BLD AUTO: 5.9 % — SIGNIFICANT CHANGE UP (ref 0–6)
HCT VFR BLD CALC: 33.2 % — LOW (ref 39–50)
HGB BLD-MCNC: 11.6 G/DL — LOW (ref 13–17)
IMM GRANULOCYTES NFR BLD AUTO: 3.8 % — HIGH (ref 0–0.9)
LYMPHOCYTES # BLD AUTO: 1.48 K/UL — SIGNIFICANT CHANGE UP (ref 1–3.3)
LYMPHOCYTES # BLD AUTO: 34.9 % — SIGNIFICANT CHANGE UP (ref 13–44)
MCHC RBC-ENTMCNC: 34.9 G/DL — SIGNIFICANT CHANGE UP (ref 32–36)
MCHC RBC-ENTMCNC: 38.5 PG — HIGH (ref 27–34)
MCV RBC AUTO: 110.3 FL — HIGH (ref 80–100)
MONOCYTES # BLD AUTO: 0.28 K/UL — SIGNIFICANT CHANGE UP (ref 0–0.9)
MONOCYTES NFR BLD AUTO: 6.6 % — SIGNIFICANT CHANGE UP (ref 2–14)
NEUTROPHILS # BLD AUTO: 1.92 K/UL — SIGNIFICANT CHANGE UP (ref 1.8–7.4)
NEUTROPHILS NFR BLD AUTO: 45.3 % — SIGNIFICANT CHANGE UP (ref 43–77)
NRBC # BLD: 0 /100 WBCS — SIGNIFICANT CHANGE UP (ref 0–0)
PLATELET # BLD AUTO: 257 K/UL — SIGNIFICANT CHANGE UP (ref 150–400)
RBC # BLD: 3.01 M/UL — LOW (ref 4.2–5.8)
RBC # FLD: 17.8 % — HIGH (ref 10.3–14.5)
WBC # BLD: 4.24 K/UL — SIGNIFICANT CHANGE UP (ref 3.8–10.5)
WBC # FLD AUTO: 4.24 K/UL — SIGNIFICANT CHANGE UP (ref 3.8–10.5)

## 2024-10-11 ENCOUNTER — APPOINTMENT (OUTPATIENT)
Dept: INFUSION THERAPY | Facility: HOSPITAL | Age: 84
End: 2024-10-11

## 2024-10-15 ENCOUNTER — LABORATORY RESULT (OUTPATIENT)
Age: 84
End: 2024-10-15

## 2024-10-17 ENCOUNTER — APPOINTMENT (OUTPATIENT)
Dept: AFTER HOURS CARE | Facility: EMERGENCY ROOM | Age: 84
End: 2024-10-17

## 2024-10-22 ENCOUNTER — LABORATORY RESULT (OUTPATIENT)
Age: 84
End: 2024-10-22

## 2024-10-23 ENCOUNTER — APPOINTMENT (OUTPATIENT)
Dept: HEMATOLOGY ONCOLOGY | Facility: CLINIC | Age: 84
End: 2024-10-23
Payer: MEDICARE

## 2024-10-23 VITALS
DIASTOLIC BLOOD PRESSURE: 70 MMHG | SYSTOLIC BLOOD PRESSURE: 155 MMHG | OXYGEN SATURATION: 99 % | HEART RATE: 80 BPM | TEMPERATURE: 97.8 F | WEIGHT: 186.07 LBS | RESPIRATION RATE: 16 BRPM | BODY MASS INDEX: 29.14 KG/M2

## 2024-10-23 DIAGNOSIS — D46.C MYELODYSPLASTIC SYNDROME WITH ISOLATED DEL(5Q) CHROMOSOMAL ABNORMALITY: ICD-10-CM

## 2024-10-23 PROCEDURE — 99213 OFFICE O/P EST LOW 20 MIN: CPT

## 2024-10-24 ENCOUNTER — APPOINTMENT (OUTPATIENT)
Dept: INTERNAL MEDICINE | Facility: CLINIC | Age: 84
End: 2024-10-24

## 2024-10-29 ENCOUNTER — LABORATORY RESULT (OUTPATIENT)
Age: 84
End: 2024-10-29

## 2024-11-11 ENCOUNTER — APPOINTMENT (OUTPATIENT)
Dept: HEMATOLOGY ONCOLOGY | Facility: CLINIC | Age: 84
End: 2024-11-11

## 2024-11-11 ENCOUNTER — RESULT REVIEW (OUTPATIENT)
Age: 84
End: 2024-11-11

## 2024-11-11 ENCOUNTER — NON-APPOINTMENT (OUTPATIENT)
Age: 84
End: 2024-11-11

## 2024-11-11 ENCOUNTER — APPOINTMENT (OUTPATIENT)
Dept: INFUSION THERAPY | Facility: HOSPITAL | Age: 84
End: 2024-11-11

## 2024-11-11 LAB
ALBUMIN SERPL ELPH-MCNC: 3.8 G/DL — SIGNIFICANT CHANGE UP (ref 3.3–5)
ALP SERPL-CCNC: 86 U/L — SIGNIFICANT CHANGE UP (ref 40–120)
ALT FLD-CCNC: 19 U/L — SIGNIFICANT CHANGE UP (ref 10–45)
ANION GAP SERPL CALC-SCNC: 15 MMOL/L — SIGNIFICANT CHANGE UP (ref 5–17)
ANISOCYTOSIS BLD QL: SLIGHT — SIGNIFICANT CHANGE UP
AST SERPL-CCNC: 32 U/L — SIGNIFICANT CHANGE UP (ref 10–40)
BASOPHILS # BLD AUTO: 0.19 K/UL — SIGNIFICANT CHANGE UP (ref 0–0.2)
BASOPHILS NFR BLD AUTO: 4 % — HIGH (ref 0–2)
BILIRUB SERPL-MCNC: 0.4 MG/DL — SIGNIFICANT CHANGE UP (ref 0.2–1.2)
BUN SERPL-MCNC: 25 MG/DL — HIGH (ref 7–23)
CALCIUM SERPL-MCNC: 9.2 MG/DL — SIGNIFICANT CHANGE UP (ref 8.4–10.5)
CHLORIDE SERPL-SCNC: 102 MMOL/L — SIGNIFICANT CHANGE UP (ref 96–108)
CO2 SERPL-SCNC: 23 MMOL/L — SIGNIFICANT CHANGE UP (ref 22–31)
CREAT SERPL-MCNC: 1.86 MG/DL — HIGH (ref 0.5–1.3)
DACRYOCYTES BLD QL SMEAR: SLIGHT — SIGNIFICANT CHANGE UP
EGFR: 35 ML/MIN/1.73M2 — LOW
ELLIPTOCYTES BLD QL SMEAR: SLIGHT — SIGNIFICANT CHANGE UP
EOSINOPHIL # BLD AUTO: 0.05 K/UL — SIGNIFICANT CHANGE UP (ref 0–0.5)
EOSINOPHIL NFR BLD AUTO: 1 % — SIGNIFICANT CHANGE UP (ref 0–6)
GLUCOSE SERPL-MCNC: 185 MG/DL — HIGH (ref 70–99)
HCT VFR BLD CALC: 30.1 % — LOW (ref 39–50)
HGB BLD-MCNC: 10.3 G/DL — LOW (ref 13–17)
LDH SERPL L TO P-CCNC: 294 U/L — HIGH (ref 50–242)
LYMPHOCYTES # BLD AUTO: 1.47 K/UL — SIGNIFICANT CHANGE UP (ref 1–3.3)
LYMPHOCYTES # BLD AUTO: 31 % — SIGNIFICANT CHANGE UP (ref 13–44)
MCHC RBC-ENTMCNC: 34.2 G/DL — SIGNIFICANT CHANGE UP (ref 32–36)
MCHC RBC-ENTMCNC: 38.4 PG — HIGH (ref 27–34)
MCV RBC AUTO: 112.3 FL — HIGH (ref 80–100)
MONOCYTES # BLD AUTO: 0.14 K/UL — SIGNIFICANT CHANGE UP (ref 0–0.9)
MONOCYTES NFR BLD AUTO: 3 % — SIGNIFICANT CHANGE UP (ref 2–14)
MYELOCYTES NFR BLD: 3 % — HIGH (ref 0–0)
NEUTROPHILS # BLD AUTO: 2.74 K/UL — SIGNIFICANT CHANGE UP (ref 1.8–7.4)
NEUTROPHILS NFR BLD AUTO: 58 % — SIGNIFICANT CHANGE UP (ref 43–77)
NRBC # BLD: 0 /100 WBCS — SIGNIFICANT CHANGE UP (ref 0–0)
NRBC # BLD: SIGNIFICANT CHANGE UP /100 WBCS (ref 0–0)
PLAT MORPH BLD: NORMAL — SIGNIFICANT CHANGE UP
PLATELET # BLD AUTO: 200 K/UL — SIGNIFICANT CHANGE UP (ref 150–400)
POIKILOCYTOSIS BLD QL AUTO: SLIGHT — SIGNIFICANT CHANGE UP
POTASSIUM SERPL-MCNC: 3.9 MMOL/L — SIGNIFICANT CHANGE UP (ref 3.5–5.3)
POTASSIUM SERPL-SCNC: 3.9 MMOL/L — SIGNIFICANT CHANGE UP (ref 3.5–5.3)
PROT SERPL-MCNC: 6.7 G/DL — SIGNIFICANT CHANGE UP (ref 6–8.3)
RBC # BLD: 2.68 M/UL — LOW (ref 4.2–5.8)
RBC # FLD: 18.6 % — HIGH (ref 10.3–14.5)
RBC BLD AUTO: ABNORMAL
SODIUM SERPL-SCNC: 140 MMOL/L — SIGNIFICANT CHANGE UP (ref 135–145)
WBC # BLD: 4.73 K/UL — SIGNIFICANT CHANGE UP (ref 3.8–10.5)
WBC # FLD AUTO: 4.73 K/UL — SIGNIFICANT CHANGE UP (ref 3.8–10.5)

## 2024-11-12 ENCOUNTER — APPOINTMENT (OUTPATIENT)
Dept: INFUSION THERAPY | Facility: HOSPITAL | Age: 84
End: 2024-11-12

## 2024-11-13 ENCOUNTER — APPOINTMENT (OUTPATIENT)
Dept: INFUSION THERAPY | Facility: HOSPITAL | Age: 84
End: 2024-11-13

## 2024-11-14 ENCOUNTER — RESULT REVIEW (OUTPATIENT)
Age: 84
End: 2024-11-14

## 2024-11-14 ENCOUNTER — APPOINTMENT (OUTPATIENT)
Dept: HEMATOLOGY ONCOLOGY | Facility: CLINIC | Age: 84
End: 2024-11-14

## 2024-11-14 ENCOUNTER — APPOINTMENT (OUTPATIENT)
Dept: INFUSION THERAPY | Facility: HOSPITAL | Age: 84
End: 2024-11-14

## 2024-11-14 LAB
ANISOCYTOSIS BLD QL: SLIGHT — SIGNIFICANT CHANGE UP
BASO STIPL BLD QL SMEAR: PRESENT — SIGNIFICANT CHANGE UP
BASOPHILS # BLD AUTO: 0.07 K/UL — SIGNIFICANT CHANGE UP (ref 0–0.2)
BASOPHILS NFR BLD AUTO: 1 % — SIGNIFICANT CHANGE UP (ref 0–2)
DACRYOCYTES BLD QL SMEAR: SLIGHT — SIGNIFICANT CHANGE UP
ELLIPTOCYTES BLD QL SMEAR: SLIGHT — SIGNIFICANT CHANGE UP
EOSINOPHIL # BLD AUTO: 0.07 K/UL — SIGNIFICANT CHANGE UP (ref 0–0.5)
EOSINOPHIL NFR BLD AUTO: 1 % — SIGNIFICANT CHANGE UP (ref 0–6)
HCT VFR BLD CALC: 32.4 % — LOW (ref 39–50)
HGB BLD-MCNC: 11.2 G/DL — LOW (ref 13–17)
LG PLATELETS BLD QL AUTO: SLIGHT — SIGNIFICANT CHANGE UP
LYMPHOCYTES # BLD AUTO: 0.94 K/UL — LOW (ref 1–3.3)
LYMPHOCYTES # BLD AUTO: 13 % — SIGNIFICANT CHANGE UP (ref 13–44)
MCHC RBC-ENTMCNC: 34.6 G/DL — SIGNIFICANT CHANGE UP (ref 32–36)
MCHC RBC-ENTMCNC: 39 PG — HIGH (ref 27–34)
MCV RBC AUTO: 112.9 FL — HIGH (ref 80–100)
MONOCYTES # BLD AUTO: 0.65 K/UL — SIGNIFICANT CHANGE UP (ref 0–0.9)
MONOCYTES NFR BLD AUTO: 9 % — SIGNIFICANT CHANGE UP (ref 2–14)
MYELOCYTES NFR BLD: 5 % — HIGH (ref 0–0)
NEUTROPHILS # BLD AUTO: 5.15 K/UL — SIGNIFICANT CHANGE UP (ref 1.8–7.4)
NEUTROPHILS NFR BLD AUTO: 71 % — SIGNIFICANT CHANGE UP (ref 43–77)
NRBC # BLD: 0 /100 WBCS — SIGNIFICANT CHANGE UP (ref 0–0)
NRBC # BLD: SIGNIFICANT CHANGE UP /100 WBCS (ref 0–0)
PLAT MORPH BLD: ABNORMAL
PLATELET # BLD AUTO: 209 K/UL — SIGNIFICANT CHANGE UP (ref 150–400)
POIKILOCYTOSIS BLD QL AUTO: SLIGHT — SIGNIFICANT CHANGE UP
RBC # BLD: 2.87 M/UL — LOW (ref 4.2–5.8)
RBC # FLD: 18.3 % — HIGH (ref 10.3–14.5)
RBC BLD AUTO: ABNORMAL
VIT B12 SERPL-MCNC: 1659 PG/ML — HIGH (ref 232–1245)
WBC # BLD: 7.25 K/UL — SIGNIFICANT CHANGE UP (ref 3.8–10.5)
WBC # FLD AUTO: 7.25 K/UL — SIGNIFICANT CHANGE UP (ref 3.8–10.5)

## 2024-11-15 ENCOUNTER — APPOINTMENT (OUTPATIENT)
Dept: INFUSION THERAPY | Facility: HOSPITAL | Age: 84
End: 2024-11-15

## 2024-11-20 ENCOUNTER — OUTPATIENT (OUTPATIENT)
Dept: OUTPATIENT SERVICES | Facility: HOSPITAL | Age: 84
LOS: 1 days | Discharge: HOME | End: 2024-11-20

## 2024-11-20 DIAGNOSIS — Z86.79 PERSONAL HISTORY OF OTHER DISEASES OF THE CIRCULATORY SYSTEM: Chronic | ICD-10-CM

## 2024-11-20 DIAGNOSIS — Z90.79 ACQUIRED ABSENCE OF OTHER GENITAL ORGAN(S): Chronic | ICD-10-CM

## 2024-11-20 DIAGNOSIS — Z98.890 OTHER SPECIFIED POSTPROCEDURAL STATES: Chronic | ICD-10-CM

## 2024-11-20 DIAGNOSIS — D64.9 ANEMIA, UNSPECIFIED: ICD-10-CM

## 2024-11-20 DIAGNOSIS — D46.9 MYELODYSPLASTIC SYNDROME, UNSPECIFIED: ICD-10-CM

## 2024-11-20 DIAGNOSIS — Z98.49 CATARACT EXTRACTION STATUS, UNSPECIFIED EYE: Chronic | ICD-10-CM

## 2024-11-22 NOTE — PROGRESS NOTE ADULT - ASSESSMENT
82M w/ PMH HTN, CKD, renal artery stenosis s/p stent, CAD (LAD 60%, OM2 60%, RCA ), PAD, MDS who presented from outpatient rheumatology office for new onset Afib. He was also found to be anemic. Cardiology consulted for NSTEMI, which is likely type 2 secondary to anemia. Currently, he is mildly volume overloaded, but without symptoms and HDS.    Recs:  - tele  - echo; would assess for aortic valve stenosis as part of workup given prior mild AS and now with murmur showing significant signs of AS  - anemia workup per primary team  - would keep negative 500-1000cc; defer diuretics to renal  - no urgent indication for cath given anemia and lack of symptoms  - con't statin and coreg as above  - defer anticoagulation for stroke prevention decision to primary team given current anemia    Jean Pierre Humphrey MD  Chief Cardiology Fellow PGY-6  [facility]    Notes are not final until signed by attending  For all consults and questions:  [email or web address]   Login: [email or web address] Please update family info in the problem list for patient and sister 82M w/ PMH HTN, CKD, renal artery stenosis s/p stent, CAD (LAD 60%, OM2 60%, RCA ), PAD, MDS who presented from outpatient rheumatology office for arrhtymia. He was also found to be anemic. Cardiology consulted for NSTEMI, which is likely type 2 secondary to anemia. Currently, he is mildly volume overloaded, but without symptoms and HDS.    Recs:  - tele; showing sinus with periods of AT  - echo; would assess for aortic valve stenosis as part of workup given prior mild AS and now with murmur showing significant signs of AS  - anemia workup per primary team  - would keep negative 500-1000cc; defer diuretics to renal  - no urgent indication for cath given anemia and lack of symptoms  - con't statin and coreg as above    Jean Pierre Humphrey MD  Chief Cardiology Fellow PGY-6  [facility]    Notes are not final until signed by attending  For all consults and questions:  [email or web address]   Login: [email or web address]

## 2024-11-25 ENCOUNTER — TRANSCRIPTION ENCOUNTER (OUTPATIENT)
Age: 84
End: 2024-11-25

## 2024-11-26 ENCOUNTER — RESULT REVIEW (OUTPATIENT)
Age: 84
End: 2024-11-26

## 2024-11-26 ENCOUNTER — APPOINTMENT (OUTPATIENT)
Dept: HEMATOLOGY ONCOLOGY | Facility: CLINIC | Age: 84
End: 2024-11-26

## 2024-11-26 ENCOUNTER — LABORATORY RESULT (OUTPATIENT)
Age: 84
End: 2024-11-26

## 2024-11-26 ENCOUNTER — APPOINTMENT (OUTPATIENT)
Dept: HEMATOLOGY ONCOLOGY | Facility: CLINIC | Age: 84
End: 2024-11-26
Payer: MEDICARE

## 2024-11-26 VITALS
HEART RATE: 91 BPM | OXYGEN SATURATION: 98 % | WEIGHT: 177.47 LBS | DIASTOLIC BLOOD PRESSURE: 81 MMHG | TEMPERATURE: 96.4 F | SYSTOLIC BLOOD PRESSURE: 130 MMHG | RESPIRATION RATE: 16 BRPM | BODY MASS INDEX: 27.8 KG/M2

## 2024-11-26 DIAGNOSIS — D46.C MYELODYSPLASTIC SYNDROME WITH ISOLATED DEL(5Q) CHROMOSOMAL ABNORMALITY: ICD-10-CM

## 2024-11-26 LAB
APPEARANCE: ABNORMAL
BASOPHILS # BLD AUTO: 0.03 K/UL — SIGNIFICANT CHANGE UP (ref 0–0.2)
BASOPHILS NFR BLD AUTO: 0.8 % — SIGNIFICANT CHANGE UP (ref 0–2)
BILIRUBIN URINE: NEGATIVE
BLOOD URINE: ABNORMAL
COLOR: YELLOW
EOSINOPHIL # BLD AUTO: 0.14 K/UL — SIGNIFICANT CHANGE UP (ref 0–0.5)
EOSINOPHIL NFR BLD AUTO: 3.9 % — SIGNIFICANT CHANGE UP (ref 0–6)
GLUCOSE QUALITATIVE U: NEGATIVE
HCT VFR BLD CALC: 28.2 % — LOW (ref 39–50)
HGB BLD-MCNC: 9.5 G/DL — LOW (ref 13–17)
IMM GRANULOCYTES NFR BLD AUTO: 0.6 % — SIGNIFICANT CHANGE UP (ref 0–0.9)
KETONES URINE: NEGATIVE
LEUKOCYTE ESTERASE URINE: ABNORMAL
LYMPHOCYTES # BLD AUTO: 0.92 K/UL — LOW (ref 1–3.3)
LYMPHOCYTES # BLD AUTO: 25.8 % — SIGNIFICANT CHANGE UP (ref 13–44)
MCHC RBC-ENTMCNC: 33.7 G/DL — SIGNIFICANT CHANGE UP (ref 32–36)
MCHC RBC-ENTMCNC: 38.5 PG — HIGH (ref 27–34)
MCV RBC AUTO: 114.2 FL — HIGH (ref 80–100)
MONOCYTES # BLD AUTO: 0.25 K/UL — SIGNIFICANT CHANGE UP (ref 0–0.9)
MONOCYTES NFR BLD AUTO: 7 % — SIGNIFICANT CHANGE UP (ref 2–14)
NEUTROPHILS # BLD AUTO: 2.21 K/UL — SIGNIFICANT CHANGE UP (ref 1.8–7.4)
NEUTROPHILS NFR BLD AUTO: 61.9 % — SIGNIFICANT CHANGE UP (ref 43–77)
NITRITE URINE: NEGATIVE
NRBC # BLD: 0 /100 WBCS — SIGNIFICANT CHANGE UP (ref 0–0)
NRBC BLD-RTO: 0 /100 WBCS — SIGNIFICANT CHANGE UP (ref 0–0)
PH URINE: 6
PLATELET # BLD AUTO: 85 K/UL — LOW (ref 150–400)
PROTEIN URINE: ABNORMAL
RBC # BLD: 2.47 M/UL — LOW (ref 4.2–5.8)
RBC # FLD: 17.9 % — HIGH (ref 10.3–14.5)
SPECIFIC GRAVITY URINE: 1.02
UROBILINOGEN URINE: NORMAL
WBC # BLD: 3.57 K/UL — LOW (ref 3.8–10.5)
WBC # FLD AUTO: 3.57 K/UL — LOW (ref 3.8–10.5)

## 2024-11-26 PROCEDURE — 99213 OFFICE O/P EST LOW 20 MIN: CPT

## 2024-11-27 ENCOUNTER — APPOINTMENT (OUTPATIENT)
Dept: CARDIOLOGY | Facility: CLINIC | Age: 84
End: 2024-11-27
Payer: MEDICARE

## 2024-11-27 VITALS
DIASTOLIC BLOOD PRESSURE: 81 MMHG | SYSTOLIC BLOOD PRESSURE: 135 MMHG | BODY MASS INDEX: 28.25 KG/M2 | HEIGHT: 67 IN | HEART RATE: 75 BPM | OXYGEN SATURATION: 99 % | WEIGHT: 180 LBS

## 2024-11-27 DIAGNOSIS — I50.20 UNSPECIFIED SYSTOLIC (CONGESTIVE) HEART FAILURE: ICD-10-CM

## 2024-11-27 DIAGNOSIS — R35.0 FREQUENCY OF MICTURITION: ICD-10-CM

## 2024-11-27 DIAGNOSIS — I35.0 NONRHEUMATIC AORTIC (VALVE) STENOSIS: ICD-10-CM

## 2024-11-27 LAB
ALBUMIN SERPL ELPH-MCNC: 4 G/DL
ALP BLD-CCNC: 100 U/L
ALT SERPL-CCNC: 24 U/L
ANION GAP SERPL CALC-SCNC: 14 MMOL/L
AST SERPL-CCNC: 27 U/L
BILIRUB SERPL-MCNC: 0.6 MG/DL
BUN SERPL-MCNC: 43 MG/DL
CALCIUM SERPL-MCNC: 9.4 MG/DL
CHLORIDE SERPL-SCNC: 102 MMOL/L
CO2 SERPL-SCNC: 25 MMOL/L
CREAT SERPL-MCNC: 2.19 MG/DL
EGFR: 29 ML/MIN/1.73M2
GLUCOSE SERPL-MCNC: 156 MG/DL
LDH SERPL-CCNC: 223 U/L
POTASSIUM SERPL-SCNC: 4.9 MMOL/L
PROT SERPL-MCNC: 6.8 G/DL
SODIUM SERPL-SCNC: 141 MMOL/L

## 2024-11-27 PROCEDURE — G2211 COMPLEX E/M VISIT ADD ON: CPT

## 2024-11-27 PROCEDURE — 99214 OFFICE O/P EST MOD 30 MIN: CPT

## 2024-11-29 ENCOUNTER — NON-APPOINTMENT (OUTPATIENT)
Age: 84
End: 2024-11-29

## 2024-12-02 ENCOUNTER — APPOINTMENT (OUTPATIENT)
Dept: HEMATOLOGY ONCOLOGY | Facility: CLINIC | Age: 84
End: 2024-12-02

## 2024-12-02 ENCOUNTER — APPOINTMENT (OUTPATIENT)
Dept: INFUSION THERAPY | Facility: HOSPITAL | Age: 84
End: 2024-12-02

## 2024-12-03 ENCOUNTER — RX RENEWAL (OUTPATIENT)
Age: 84
End: 2024-12-03

## 2024-12-11 ENCOUNTER — APPOINTMENT (OUTPATIENT)
Dept: CARDIOLOGY | Facility: CLINIC | Age: 84
End: 2024-12-11
Payer: MEDICARE

## 2024-12-11 PROCEDURE — 93306 TTE W/DOPPLER COMPLETE: CPT

## 2024-12-16 ENCOUNTER — RESULT REVIEW (OUTPATIENT)
Age: 84
End: 2024-12-16

## 2024-12-16 ENCOUNTER — APPOINTMENT (OUTPATIENT)
Dept: HEMATOLOGY ONCOLOGY | Facility: CLINIC | Age: 84
End: 2024-12-16

## 2024-12-16 ENCOUNTER — APPOINTMENT (OUTPATIENT)
Dept: INFUSION THERAPY | Facility: HOSPITAL | Age: 84
End: 2024-12-16

## 2024-12-16 DIAGNOSIS — M25.472 EFFUSION, LEFT ANKLE: ICD-10-CM

## 2024-12-16 LAB
ALBUMIN SERPL ELPH-MCNC: 3.9 G/DL — SIGNIFICANT CHANGE UP (ref 3.3–5)
ALP SERPL-CCNC: 91 U/L — SIGNIFICANT CHANGE UP (ref 40–120)
ALT FLD-CCNC: 9 U/L — LOW (ref 10–45)
ANION GAP SERPL CALC-SCNC: 15 MMOL/L — SIGNIFICANT CHANGE UP (ref 5–17)
AST SERPL-CCNC: 17 U/L — SIGNIFICANT CHANGE UP (ref 10–40)
BASOPHILS # BLD AUTO: 0.11 K/UL — SIGNIFICANT CHANGE UP (ref 0–0.2)
BASOPHILS NFR BLD AUTO: 1.7 % — SIGNIFICANT CHANGE UP (ref 0–2)
BILIRUB SERPL-MCNC: 0.4 MG/DL — SIGNIFICANT CHANGE UP (ref 0.2–1.2)
BUN SERPL-MCNC: 25 MG/DL — HIGH (ref 7–23)
CALCIUM SERPL-MCNC: 8.9 MG/DL — SIGNIFICANT CHANGE UP (ref 8.4–10.5)
CHLORIDE SERPL-SCNC: 99 MMOL/L — SIGNIFICANT CHANGE UP (ref 96–108)
CO2 SERPL-SCNC: 24 MMOL/L — SIGNIFICANT CHANGE UP (ref 22–31)
CREAT SERPL-MCNC: 2.21 MG/DL — HIGH (ref 0.5–1.3)
EGFR: 29 ML/MIN/1.73M2 — LOW
EOSINOPHIL # BLD AUTO: 0.1 K/UL — SIGNIFICANT CHANGE UP (ref 0–0.5)
EOSINOPHIL NFR BLD AUTO: 1.6 % — SIGNIFICANT CHANGE UP (ref 0–6)
GLUCOSE SERPL-MCNC: 178 MG/DL — HIGH (ref 70–99)
HCT VFR BLD CALC: 30.3 % — LOW (ref 39–50)
HGB BLD-MCNC: 10.1 G/DL — LOW (ref 13–17)
IMM GRANULOCYTES NFR BLD AUTO: 3.8 % — HIGH (ref 0–0.9)
LDH SERPL L TO P-CCNC: 227 U/L — SIGNIFICANT CHANGE UP (ref 50–242)
LYMPHOCYTES # BLD AUTO: 1.11 K/UL — SIGNIFICANT CHANGE UP (ref 1–3.3)
LYMPHOCYTES # BLD AUTO: 17.6 % — SIGNIFICANT CHANGE UP (ref 13–44)
MCHC RBC-ENTMCNC: 33.3 G/DL — SIGNIFICANT CHANGE UP (ref 32–36)
MCHC RBC-ENTMCNC: 36.9 PG — HIGH (ref 27–34)
MCV RBC AUTO: 110.6 FL — HIGH (ref 80–100)
MONOCYTES # BLD AUTO: 0.59 K/UL — SIGNIFICANT CHANGE UP (ref 0–0.9)
MONOCYTES NFR BLD AUTO: 9.3 % — SIGNIFICANT CHANGE UP (ref 2–14)
NEUTROPHILS # BLD AUTO: 4.17 K/UL — SIGNIFICANT CHANGE UP (ref 1.8–7.4)
NEUTROPHILS NFR BLD AUTO: 66 % — SIGNIFICANT CHANGE UP (ref 43–77)
NRBC # BLD: 0 /100 WBCS — SIGNIFICANT CHANGE UP (ref 0–0)
NRBC BLD-RTO: 0 /100 WBCS — SIGNIFICANT CHANGE UP (ref 0–0)
PLATELET # BLD AUTO: 237 K/UL — SIGNIFICANT CHANGE UP (ref 150–400)
POTASSIUM SERPL-MCNC: 3.9 MMOL/L — SIGNIFICANT CHANGE UP (ref 3.5–5.3)
POTASSIUM SERPL-SCNC: 3.9 MMOL/L — SIGNIFICANT CHANGE UP (ref 3.5–5.3)
PROT SERPL-MCNC: 6.9 G/DL — SIGNIFICANT CHANGE UP (ref 6–8.3)
RBC # BLD: 2.74 M/UL — LOW (ref 4.2–5.8)
RBC # FLD: 18 % — HIGH (ref 10.3–14.5)
SODIUM SERPL-SCNC: 138 MMOL/L — SIGNIFICANT CHANGE UP (ref 135–145)
WBC # BLD: 6.32 K/UL — SIGNIFICANT CHANGE UP (ref 3.8–10.5)
WBC # FLD AUTO: 6.32 K/UL — SIGNIFICANT CHANGE UP (ref 3.8–10.5)

## 2024-12-17 ENCOUNTER — TRANSCRIPTION ENCOUNTER (OUTPATIENT)
Age: 84
End: 2024-12-17

## 2024-12-17 ENCOUNTER — NON-APPOINTMENT (OUTPATIENT)
Age: 84
End: 2024-12-17

## 2024-12-17 ENCOUNTER — APPOINTMENT (OUTPATIENT)
Dept: INFUSION THERAPY | Facility: HOSPITAL | Age: 84
End: 2024-12-17

## 2024-12-17 DIAGNOSIS — Z51.11 ENCOUNTER FOR ANTINEOPLASTIC CHEMOTHERAPY: ICD-10-CM

## 2024-12-17 DIAGNOSIS — R11.2 NAUSEA WITH VOMITING, UNSPECIFIED: ICD-10-CM

## 2024-12-18 ENCOUNTER — APPOINTMENT (OUTPATIENT)
Dept: INFUSION THERAPY | Facility: HOSPITAL | Age: 84
End: 2024-12-18

## 2024-12-18 ENCOUNTER — OUTPATIENT (OUTPATIENT)
Dept: OUTPATIENT SERVICES | Facility: HOSPITAL | Age: 84
LOS: 1 days | End: 2024-12-18
Payer: MEDICARE

## 2024-12-18 ENCOUNTER — RESULT REVIEW (OUTPATIENT)
Age: 84
End: 2024-12-18

## 2024-12-18 ENCOUNTER — APPOINTMENT (OUTPATIENT)
Dept: RADIOLOGY | Facility: IMAGING CENTER | Age: 84
End: 2024-12-18
Payer: MEDICARE

## 2024-12-18 DIAGNOSIS — M25.472 EFFUSION, LEFT ANKLE: ICD-10-CM

## 2024-12-18 DIAGNOSIS — Z98.890 OTHER SPECIFIED POSTPROCEDURAL STATES: Chronic | ICD-10-CM

## 2024-12-18 DIAGNOSIS — Z98.49 CATARACT EXTRACTION STATUS, UNSPECIFIED EYE: Chronic | ICD-10-CM

## 2024-12-18 PROCEDURE — 73610 X-RAY EXAM OF ANKLE: CPT

## 2024-12-18 PROCEDURE — 73610 X-RAY EXAM OF ANKLE: CPT | Mod: 26,LT

## 2024-12-19 ENCOUNTER — APPOINTMENT (OUTPATIENT)
Dept: INFUSION THERAPY | Facility: HOSPITAL | Age: 84
End: 2024-12-19

## 2024-12-19 ENCOUNTER — TRANSCRIPTION ENCOUNTER (OUTPATIENT)
Age: 84
End: 2024-12-19

## 2024-12-19 ENCOUNTER — APPOINTMENT (OUTPATIENT)
Dept: HEMATOLOGY ONCOLOGY | Facility: CLINIC | Age: 84
End: 2024-12-19

## 2024-12-19 ENCOUNTER — INPATIENT (INPATIENT)
Facility: HOSPITAL | Age: 84
LOS: 7 days | Discharge: NURSING, REHAB OR LONG-TERM CARE | DRG: 872 | End: 2024-12-27
Attending: STUDENT IN AN ORGANIZED HEALTH CARE EDUCATION/TRAINING PROGRAM | Admitting: STUDENT IN AN ORGANIZED HEALTH CARE EDUCATION/TRAINING PROGRAM
Payer: MEDICARE

## 2024-12-19 VITALS
DIASTOLIC BLOOD PRESSURE: 80 MMHG | HEIGHT: 66 IN | SYSTOLIC BLOOD PRESSURE: 141 MMHG | RESPIRATION RATE: 20 BRPM | WEIGHT: 184.09 LBS | OXYGEN SATURATION: 99 % | TEMPERATURE: 98 F | HEART RATE: 113 BPM

## 2024-12-19 DIAGNOSIS — N41.9 INFLAMMATORY DISEASE OF PROSTATE, UNSPECIFIED: ICD-10-CM

## 2024-12-19 DIAGNOSIS — N18.32 CHRONIC KIDNEY DISEASE, STAGE 3B: ICD-10-CM

## 2024-12-19 DIAGNOSIS — R78.81 BACTEREMIA: ICD-10-CM

## 2024-12-19 DIAGNOSIS — Z98.49 CATARACT EXTRACTION STATUS, UNSPECIFIED EYE: Chronic | ICD-10-CM

## 2024-12-19 DIAGNOSIS — Z90.79 ACQUIRED ABSENCE OF OTHER GENITAL ORGAN(S): Chronic | ICD-10-CM

## 2024-12-19 DIAGNOSIS — I25.10 ATHEROSCLEROTIC HEART DISEASE OF NATIVE CORONARY ARTERY WITHOUT ANGINA PECTORIS: ICD-10-CM

## 2024-12-19 DIAGNOSIS — I10 ESSENTIAL (PRIMARY) HYPERTENSION: ICD-10-CM

## 2024-12-19 DIAGNOSIS — M25.572 PAIN IN LEFT ANKLE AND JOINTS OF LEFT FOOT: ICD-10-CM

## 2024-12-19 DIAGNOSIS — M48.07 SPINAL STENOSIS, LUMBOSACRAL REGION: ICD-10-CM

## 2024-12-19 DIAGNOSIS — Z86.79 PERSONAL HISTORY OF OTHER DISEASES OF THE CIRCULATORY SYSTEM: Chronic | ICD-10-CM

## 2024-12-19 DIAGNOSIS — Z29.9 ENCOUNTER FOR PROPHYLACTIC MEASURES, UNSPECIFIED: ICD-10-CM

## 2024-12-19 DIAGNOSIS — D46.9 MYELODYSPLASTIC SYNDROME, UNSPECIFIED: ICD-10-CM

## 2024-12-19 DIAGNOSIS — N39.0 URINARY TRACT INFECTION, SITE NOT SPECIFIED: ICD-10-CM

## 2024-12-19 DIAGNOSIS — Z98.890 OTHER SPECIFIED POSTPROCEDURAL STATES: Chronic | ICD-10-CM

## 2024-12-19 LAB
-  STENOTROPHOMONAS MALTOPHILIA: SIGNIFICANT CHANGE UP
ALBUMIN SERPL ELPH-MCNC: 3.6 G/DL — SIGNIFICANT CHANGE UP (ref 3.3–5)
ALP SERPL-CCNC: 85 U/L — SIGNIFICANT CHANGE UP (ref 40–120)
ALT FLD-CCNC: 10 U/L — SIGNIFICANT CHANGE UP (ref 10–45)
ANION GAP SERPL CALC-SCNC: 16 MMOL/L — SIGNIFICANT CHANGE UP (ref 5–17)
ANISOCYTOSIS BLD QL: SLIGHT — SIGNIFICANT CHANGE UP
APPEARANCE UR: CLEAR — SIGNIFICANT CHANGE UP
APTT BLD: 28.4 SEC — SIGNIFICANT CHANGE UP (ref 24.5–35.6)
AST SERPL-CCNC: 14 U/L — SIGNIFICANT CHANGE UP (ref 10–40)
BACTERIA # UR AUTO: ABNORMAL /HPF
BASOPHILS # BLD AUTO: 0 K/UL — SIGNIFICANT CHANGE UP (ref 0–0.2)
BASOPHILS NFR BLD AUTO: 0 % — SIGNIFICANT CHANGE UP (ref 0–2)
BILIRUB SERPL-MCNC: 0.4 MG/DL — SIGNIFICANT CHANGE UP (ref 0.2–1.2)
BILIRUB UR-MCNC: NEGATIVE — SIGNIFICANT CHANGE UP
BUN SERPL-MCNC: 34 MG/DL — HIGH (ref 7–23)
CALCIUM SERPL-MCNC: 8.6 MG/DL — SIGNIFICANT CHANGE UP (ref 8.4–10.5)
CAST: 0 /LPF — SIGNIFICANT CHANGE UP (ref 0–4)
CHLORIDE SERPL-SCNC: 99 MMOL/L — SIGNIFICANT CHANGE UP (ref 96–108)
CO2 SERPL-SCNC: 22 MMOL/L — SIGNIFICANT CHANGE UP (ref 22–31)
COLOR SPEC: YELLOW — SIGNIFICANT CHANGE UP
CREAT SERPL-MCNC: 2.06 MG/DL — HIGH (ref 0.5–1.3)
DACRYOCYTES BLD QL SMEAR: SLIGHT — SIGNIFICANT CHANGE UP
DIFF PNL FLD: NEGATIVE — SIGNIFICANT CHANGE UP
EGFR: 31 ML/MIN/1.73M2 — LOW
ELLIPTOCYTES BLD QL SMEAR: SLIGHT — SIGNIFICANT CHANGE UP
EOSINOPHIL # BLD AUTO: 0.42 K/UL — SIGNIFICANT CHANGE UP (ref 0–0.5)
EOSINOPHIL NFR BLD AUTO: 6.9 % — HIGH (ref 0–6)
GAS PNL BLDV: SIGNIFICANT CHANGE UP
GLUCOSE SERPL-MCNC: 121 MG/DL — HIGH (ref 70–99)
GLUCOSE UR QL: NEGATIVE MG/DL — SIGNIFICANT CHANGE UP
GRAM STN FLD: ABNORMAL
GRAM STN FLD: ABNORMAL
HCT VFR BLD CALC: 29.8 % — LOW (ref 39–50)
HGB BLD-MCNC: 9.6 G/DL — LOW (ref 13–17)
INR BLD: 1.11 RATIO — SIGNIFICANT CHANGE UP (ref 0.85–1.16)
KETONES UR-MCNC: NEGATIVE MG/DL — SIGNIFICANT CHANGE UP
LACTATE SERPL-SCNC: 1.3 MMOL/L — SIGNIFICANT CHANGE UP (ref 0.5–2)
LEUKOCYTE ESTERASE UR-ACNC: ABNORMAL
LYMPHOCYTES # BLD AUTO: 0.36 K/UL — LOW (ref 1–3.3)
LYMPHOCYTES # BLD AUTO: 6 % — LOW (ref 13–44)
MACROCYTES BLD QL: SLIGHT — SIGNIFICANT CHANGE UP
MANUAL SMEAR VERIFICATION: SIGNIFICANT CHANGE UP
MCHC RBC-ENTMCNC: 32.2 G/DL — SIGNIFICANT CHANGE UP (ref 32–36)
MCHC RBC-ENTMCNC: 36 PG — HIGH (ref 27–34)
MCV RBC AUTO: 111.6 FL — HIGH (ref 80–100)
METHOD TYPE: SIGNIFICANT CHANGE UP
MONOCYTES # BLD AUTO: 0.36 K/UL — SIGNIFICANT CHANGE UP (ref 0–0.9)
MONOCYTES NFR BLD AUTO: 6 % — SIGNIFICANT CHANGE UP (ref 2–14)
MYELOCYTES NFR BLD: 0.9 % — HIGH (ref 0–0)
NEUTROPHILS # BLD AUTO: 4.88 K/UL — SIGNIFICANT CHANGE UP (ref 1.8–7.4)
NEUTROPHILS NFR BLD AUTO: 80.2 % — HIGH (ref 43–77)
NITRITE UR-MCNC: NEGATIVE — SIGNIFICANT CHANGE UP
PH UR: 6.5 — SIGNIFICANT CHANGE UP (ref 5–8)
PLAT MORPH BLD: NORMAL — SIGNIFICANT CHANGE UP
PLATELET # BLD AUTO: 206 K/UL — SIGNIFICANT CHANGE UP (ref 150–400)
POIKILOCYTOSIS BLD QL AUTO: SLIGHT — SIGNIFICANT CHANGE UP
POLYCHROMASIA BLD QL SMEAR: SLIGHT — SIGNIFICANT CHANGE UP
POTASSIUM SERPL-MCNC: 3.7 MMOL/L — SIGNIFICANT CHANGE UP (ref 3.5–5.3)
POTASSIUM SERPL-SCNC: 3.7 MMOL/L — SIGNIFICANT CHANGE UP (ref 3.5–5.3)
PROT SERPL-MCNC: 6.7 G/DL — SIGNIFICANT CHANGE UP (ref 6–8.3)
PROT UR-MCNC: 30 MG/DL
PROTHROM AB SERPL-ACNC: 12.7 SEC — SIGNIFICANT CHANGE UP (ref 9.9–13.4)
RBC # BLD: 2.67 M/UL — LOW (ref 4.2–5.8)
RBC # FLD: 17.9 % — HIGH (ref 10.3–14.5)
RBC BLD AUTO: ABNORMAL
RBC CASTS # UR COMP ASSIST: 0 /HPF — SIGNIFICANT CHANGE UP (ref 0–4)
SODIUM SERPL-SCNC: 137 MMOL/L — SIGNIFICANT CHANGE UP (ref 135–145)
SP GR SPEC: 1.01 — SIGNIFICANT CHANGE UP (ref 1–1.03)
SPECIMEN SOURCE: SIGNIFICANT CHANGE UP
SPECIMEN SOURCE: SIGNIFICANT CHANGE UP
SQUAMOUS # UR AUTO: 1 /HPF — SIGNIFICANT CHANGE UP (ref 0–5)
UROBILINOGEN FLD QL: 0.2 MG/DL — SIGNIFICANT CHANGE UP (ref 0.2–1)
WBC # BLD: 6.08 K/UL — SIGNIFICANT CHANGE UP (ref 3.8–10.5)
WBC # FLD AUTO: 6.08 K/UL — SIGNIFICANT CHANGE UP (ref 3.8–10.5)
WBC UR QL: 88 /HPF — HIGH (ref 0–5)

## 2024-12-19 PROCEDURE — 99285 EMERGENCY DEPT VISIT HI MDM: CPT

## 2024-12-19 PROCEDURE — 71046 X-RAY EXAM CHEST 2 VIEWS: CPT | Mod: 26

## 2024-12-19 PROCEDURE — 99222 1ST HOSP IP/OBS MODERATE 55: CPT | Mod: GC

## 2024-12-19 PROCEDURE — 99223 1ST HOSP IP/OBS HIGH 75: CPT | Mod: GC

## 2024-12-19 RX ORDER — FLUCONAZOLE 200 MG/1
200 TABLET ORAL DAILY
Refills: 0 | Status: DISCONTINUED | OUTPATIENT
Start: 2024-12-19 | End: 2024-12-27

## 2024-12-19 RX ORDER — SERTRALINE HYDROCHLORIDE 25 MG/1
25 TABLET ORAL DAILY
Refills: 0 | Status: DISCONTINUED | OUTPATIENT
Start: 2024-12-19 | End: 2024-12-27

## 2024-12-19 RX ORDER — ALLOPURINOL 100 MG/1
2 TABLET ORAL
Refills: 0 | DISCHARGE

## 2024-12-19 RX ORDER — MINOCYCLINE HYDROCHLORIDE 100 MG/1
200 CAPSULE ORAL ONCE
Refills: 0 | Status: COMPLETED | OUTPATIENT
Start: 2024-12-19 | End: 2024-12-19

## 2024-12-19 RX ORDER — SULFAMETHOXAZOLE/TRIMETHOPRIM 800-160 MG
1 TABLET ORAL ONCE
Refills: 0 | Status: COMPLETED | OUTPATIENT
Start: 2024-12-19 | End: 2024-12-19

## 2024-12-19 RX ORDER — GABAPENTIN 300 MG/1
300 CAPSULE ORAL THREE TIMES A DAY
Refills: 0 | Status: DISCONTINUED | OUTPATIENT
Start: 2024-12-19 | End: 2024-12-23

## 2024-12-19 RX ORDER — ROSUVASTATIN 40 MG/1
10 TABLET, FILM COATED ORAL AT BEDTIME
Refills: 0 | Status: DISCONTINUED | OUTPATIENT
Start: 2024-12-19 | End: 2024-12-27

## 2024-12-19 RX ORDER — CLOPIDOGREL BISULFATE 75 MG/1
75 TABLET, FILM COATED ORAL DAILY
Refills: 0 | Status: DISCONTINUED | OUTPATIENT
Start: 2024-12-19 | End: 2024-12-27

## 2024-12-19 RX ORDER — SODIUM CHLORIDE 9 MG/ML
1000 INJECTION, SOLUTION INTRAVENOUS ONCE
Refills: 0 | Status: COMPLETED | OUTPATIENT
Start: 2024-12-19 | End: 2024-12-19

## 2024-12-19 RX ORDER — INFLUENZA A VIRUS A/WISCONSIN/588/2019 (H1N1) RECOMBINANT HEMAGGLUTININ ANTIGEN, INFLUENZA A VIRUS A/DARWIN/6/2021 (H3N2) RECOMBINANT HEMAGGLUTININ ANTIGEN, INFLUENZA B VIRUS B/AUSTRIA/1359417/2021 RECOMBINANT HEMAGGLUTININ ANTIGEN, AND INFLUENZA B VIRUS B/PHUKET/3073/2013 RECOMBINANT HEMAGGLUTININ ANTIGEN 45; 45; 45; 45 UG/.5ML; UG/.5ML; UG/.5ML; UG/.5ML
0.5 INJECTION INTRAMUSCULAR ONCE
Refills: 0 | Status: DISCONTINUED | OUTPATIENT
Start: 2024-12-19 | End: 2024-12-27

## 2024-12-19 RX ORDER — SULFAMETHOXAZOLE/TRIMETHOPRIM 800-160 MG
200 TABLET ORAL EVERY 12 HOURS
Refills: 0 | Status: DISCONTINUED | OUTPATIENT
Start: 2024-12-19 | End: 2024-12-19

## 2024-12-19 RX ORDER — ALLOPURINOL 100 MG/1
200 TABLET ORAL DAILY
Refills: 0 | Status: DISCONTINUED | OUTPATIENT
Start: 2024-12-19 | End: 2024-12-27

## 2024-12-19 RX ORDER — SULFAMETHOXAZOLE/TRIMETHOPRIM 800-160 MG
330 TABLET ORAL EVERY 12 HOURS
Refills: 0 | Status: DISCONTINUED | OUTPATIENT
Start: 2024-12-19 | End: 2024-12-19

## 2024-12-19 RX ORDER — TIGECYCLINE 50 MG/1
200 INJECTION, POWDER, LYOPHILIZED, FOR SOLUTION INTRAVENOUS ONCE
Refills: 0 | Status: DISCONTINUED | OUTPATIENT
Start: 2024-12-19 | End: 2024-12-19

## 2024-12-19 RX ORDER — HEPARIN SODIUM 1000 [USP'U]/ML
5000 INJECTION, SOLUTION INTRAVENOUS; SUBCUTANEOUS EVERY 8 HOURS
Refills: 0 | Status: DISCONTINUED | OUTPATIENT
Start: 2024-12-19 | End: 2024-12-27

## 2024-12-19 RX ORDER — PANTOPRAZOLE 40 MG/1
40 TABLET, DELAYED RELEASE ORAL
Refills: 0 | Status: DISCONTINUED | OUTPATIENT
Start: 2024-12-19 | End: 2024-12-27

## 2024-12-19 RX ORDER — ASPIRIN 81 MG
81 TABLET, DELAYED RELEASE (ENTERIC COATED) ORAL DAILY
Refills: 0 | Status: DISCONTINUED | OUTPATIENT
Start: 2024-12-19 | End: 2024-12-27

## 2024-12-19 RX ORDER — SULFAMETHOXAZOLE/TRIMETHOPRIM 800-160 MG
330 TABLET ORAL EVERY 12 HOURS
Refills: 0 | Status: DISCONTINUED | OUTPATIENT
Start: 2024-12-19 | End: 2024-12-20

## 2024-12-19 RX ADMIN — GABAPENTIN 300 MILLIGRAM(S): 300 CAPSULE ORAL at 23:02

## 2024-12-19 RX ADMIN — HEPARIN SODIUM 5000 UNIT(S): 1000 INJECTION, SOLUTION INTRAVENOUS; SUBCUTANEOUS at 23:01

## 2024-12-19 RX ADMIN — Medication 1 TABLET(S): at 14:51

## 2024-12-19 RX ADMIN — SODIUM CHLORIDE 1000 MILLILITER(S): 9 INJECTION, SOLUTION INTRAVENOUS at 10:55

## 2024-12-19 RX ADMIN — MINOCYCLINE HYDROCHLORIDE 200 MILLIGRAM(S): 100 CAPSULE ORAL at 14:51

## 2024-12-19 RX ADMIN — Medication 400 MILLIGRAM(S): at 18:44

## 2024-12-19 RX ADMIN — Medication 1 TABLET(S): at 14:20

## 2024-12-19 RX ADMIN — ROSUVASTATIN 10 MILLIGRAM(S): 40 TABLET, FILM COATED ORAL at 23:02

## 2024-12-19 NOTE — ED PROVIDER NOTE - PHYSICAL EXAMINATION
INITIAL VITAL SIGNS: Reviewed by me.  GENERAL: In no acute distress.  HEAD: Normocephalic. Atraumatic.  EYES: EOMI. PERRL.  ENT: Moist mucous membranes. Oropharynx is clear.   NECK: Supple. No meningismus.   CV: Regular rate and rhythm. No murmurs, rubs, or gallops.  RESPIRATORY: Unlabored respirations. Clear to ascultation bilaterally.  ABDOMEN: Soft, non-distended, non-tender. No guarding or rebound.  BACK: No CVA tenderness.  EXTREMITIES: No deformities. No edema. Healing wounds to LLE. No surrounding erythema or fluctuance. (+) Mild left ankle tenderness. 2+ pulses. Neurovascularly intact.   SKIN: Warm and dry. No rashes or petechiae.   NEURO: Grossly non-focal.

## 2024-12-19 NOTE — H&P ADULT - ATTENDING COMMENTS
Stenotrophomonas maltophilia bacteremia- given oral minocycline and Bactrim in ED  Repeat cultures sent- start Bactrim 8 mg/kg/day IV divided Q12  PICC line likely source- pull  MDS- continue Acyclovir/fluconazole prophylaxis  CKD stage 3- renally dose medications  Formal ID eval in am. Stenotrophomonas maltophilia bacteremia- given oral minocycline and Bactrim in ED  Repeat cultures sent- start Bactrim 200 mg IVSS Q12  PICC line likely source- pull  MDS- continue Acyclovir/fluconazole prophylaxis  CKD stage 3- renally dose medications  Formal ID eval in am.

## 2024-12-19 NOTE — H&P ADULT - NSHPPHYSICALEXAM_GEN_ALL_CORE
LOS:     VITALS:   T(C): 36.3 (12-19-24 @ 13:00), Max: 36.6 (12-19-24 @ 09:36)  HR: 94 (12-19-24 @ 13:00) (94 - 113)  BP: 128/74 (12-19-24 @ 13:00) (128/74 - 141/80)  RR: 16 (12-19-24 @ 13:00) (16 - 20)  SpO2: 98% (12-19-24 @ 13:00) (97% - 99%)    GENERAL: NAD, lying in bed comfortably  HEAD:  Atraumatic, Normocephalic  EYES: EOMI, PERRLA, conjunctiva and sclera clear  ENT: Moist mucous membranes  NECK: Supple, No JVD  CHEST/LUNG: Clear to auscultation bilaterally; No rales, rhonchi, wheezing, or rubs. Unlabored respirations  HEART: Regular rate and rhythm; No murmurs, rubs, or gallops  ABDOMEN: BSx4; Soft, nontender, nondistended  EXTREMITIES:  2+ Peripheral Pulses, brisk capillary refill. No clubbing, cyanosis, or edema  NERVOUS SYSTEM:  A&Ox3, no focal deficits   SKIN: No rashes. Both feet have pressure ulcers. Left heel ulcer. LOS:     VITALS:   T(C): 36.3 (12-19-24 @ 13:00), Max: 36.6 (12-19-24 @ 09:36)  HR: 94 (12-19-24 @ 13:00) (94 - 113)  BP: 128/74 (12-19-24 @ 13:00) (128/74 - 141/80)  RR: 16 (12-19-24 @ 13:00) (16 - 20)  SpO2: 98% (12-19-24 @ 13:00) (97% - 99%)    GENERAL: NAD, lying in bed comfortably  HEAD:  Atraumatic, Normocephalic  EYES: EOMI, PERRLA, conjunctiva and sclera clear  ENT: Moist mucous membranes  NECK: Supple, No JVD  CHEST/LUNG: Clear to auscultation bilaterally; No rales, rhonchi, wheezing, or rubs. Unlabored respirations  HEART: Regular rate and rhythm; No murmurs, rubs, or gallops  ABDOMEN: BSx4; Soft, nontender, +distended  EXTREMITIES:  2+ Peripheral Pulses, brisk capillary refill. No clubbing, cyanosis, or edema  NERVOUS SYSTEM:  A&Ox3, no focal deficits   SKIN: No rashes. B/l LE pressure ulcers. Left heel ulcer.

## 2024-12-19 NOTE — H&P ADULT - PROBLEM SELECTOR PLAN 10
DVT: none indicated  Code Status: Full Code  Diet: no restrictions DVT: Heparin subQ  Code Status: Full Code  Diet: DASH diet w/ renal restrictions  Dispo: PT eval pending

## 2024-12-19 NOTE — CONSULT NOTE ADULT - SUBJECTIVE AND OBJECTIVE BOX
HEME/ONC INITIAL CONSULT NOTE    CHIEF COMPLAINT:  positive blood cultures    HPI:   84-year-old Male with a history of myelodysplastic syndrome on chemo (last session ), CAD, CKD, HTN, HLD, who presents with positive blood cultures for stenotrophomonas maltophilia on 24 and referred to the ED for further evaluation this afternoon. Patient reports having blood drawn prior to his chemo session yesterday as well as left ankle xray / to pain. The patient reports mild left ankle pain and believes it may be sprained. Currently on Levaquin for a UTI. Denies fever, chills, nausea, vomiting, chest pain, shortness of breath, cough, abdominal pain.    He had a previous positive culture for S. maltophilia and MRSE in 2024 where he was seen and treated by the in-house ID team and where he was given vancomycin --> zosyn --> minocyline --> bactrim. In addition, he also has had multiple infections in the past E.faeclis (3/2024, 2023) and E. Coli (2023, 2022, 2022).     In the ED, patient afebrile, hemodynamically stable, s/p 1L LR bolus, minocycline 200mg, bactrim SS 1x, DS 1x.  (19 Dec 2024 16:48)    Interval heme/onc history:  - pt reports bruising in his legs but otherwise feels well, no acute complaints    REVIEW OF SYSTEMS:  As per HPI    PAST MEDICAL & SURGICAL HISTORY:  HTN - Hypertension  Hypercholesterolemia  PC (prostate cancer) s/p surgical resection 3 years ago  Gout  Aneurysm, ascending aorta    H/O prostatectomy  H/O carotid endarterectomy  H/O renal artery stenosis s/p stent in Left RA  Status post cataract extraction, unspecified laterality      MEDICATIONS  (STANDING):  acyclovir   Oral Tab/Cap 400 milliGRAM(s) Oral two times a day  allopurinol 200 milliGRAM(s) Oral daily  aspirin  chewable 81 milliGRAM(s) Oral daily  clopidogrel Tablet 75 milliGRAM(s) Oral daily  fluconAZOLE   Tablet 200 milliGRAM(s) Oral daily  gabapentin 300 milliGRAM(s) Oral three times a day  heparin   Injectable 5000 Unit(s) SubCutaneous every 8 hours  influenza  Vaccine (HIGH DOSE) 0.5 milliLiter(s) IntraMuscular once  pantoprazole    Tablet 40 milliGRAM(s) Oral before breakfast  rosuvastatin 10 milliGRAM(s) Oral at bedtime  sertraline 25 milliGRAM(s) Oral daily  trimethoprim / sulfamethoxazole IVPB 330 milliGRAM(s) IV Intermittent every 12 hours    MEDICATIONS  (PRN):      Allergies    No Known Allergies    Intolerances          VITAL SIGNS:  Vital Signs Last 24 Hrs  T(C): 37.2 (19 Dec 2024 21:10), Max: 37.2 (19 Dec 2024 21:10)  T(F): 99 (19 Dec 2024 21:10), Max: 99 (19 Dec 2024 21:10)  HR: 104 (19 Dec 2024 21:10) (90 - 113)  BP: 124/78 (19 Dec 2024 21:10) (124/78 - 141/80)  BP(mean): --  RR: 18 (19 Dec 2024 21:10) (16 - 20)  SpO2: 96% (19 Dec 2024 21:10) (96% - 100%)    Parameters below as of 19 Dec 2024 21:10  Patient On (Oxygen Delivery Method): room air    PHYSICAL EXAMINATION:  Constitutional: resting comfortably in bed; NAD  HEENT: NC/AT, EOMI, anicteric sclera, no nasal discharge  Respiratory: breathing comfortably on room air  Gastrointestinal: abdomen soft, distended, no tenderness to palpation   Extremities: numerous bruises / skin cuts in both feet  Neurologic: alert and oriented, no focal deficits noted  Psychiatric: affect and characteristics of appearance, verbalizations, behaviors are appropriate    LABS:                        9.6    6.08  )-----------( 206      ( 19 Dec 2024 11:01 )             29.8     12-    137  |  99  |  34[H]  ----------------------------<  121[H]  3.7   |  22  |  2.06[H]    Ca    8.6      19 Dec 2024 11:01    TPro  6.7  /  Alb  3.6  /  TBili  0.4  /  DBili  x   /  AST  14  /  ALT  10  /  AlkPhos  85  12-19    PT/INR - ( 19 Dec 2024 11:01 )   PT: 12.7 sec;   INR: 1.11 ratio         PTT - ( 19 Dec 2024 11:01 )  PTT:28.4 sec  Urinalysis Basic - ( 19 Dec 2024 13:07 )    Color: Yellow / Appearance: Clear / S.012 / pH: x  Gluc: x / Ketone: Negative mg/dL  / Bili: Negative / Urobili: 0.2 mg/dL   Blood: x / Protein: 30 mg/dL / Nitrite: Negative   Leuk Esterase: Moderate / RBC: 0 /HPF / WBC 88 /HPF   Sq Epi: x / Non Sq Epi: 1 /HPF / Bacteria: Many /HPF        RADIOLOGY & ADDITIONAL STUDIES:      IMPRESSION & RECOMMENDATIONS:   HEME/ONC INITIAL CONSULT NOTE    CHIEF COMPLAINT:  positive blood cultures    HPI:   84-year-old Male with a history of myelodysplastic syndrome on chemo (last session ), CAD, CKD, HTN, HLD, who presents with positive blood cultures for stenotrophomonas maltophilia on 24 and referred to the ED for further evaluation this afternoon. Patient reports having blood drawn prior to his chemo session yesterday as well as left ankle xray 2/ to pain. The patient reports mild left ankle pain and believes it may be sprained. Currently on Levaquin for a UTI. Denies fever, chills, nausea, vomiting, chest pain, shortness of breath, cough, abdominal pain.    He had a previous positive culture for S. maltophilia and MRSE in 2024 where he was seen and treated by the in-house ID team and where he was given vancomycin --> zosyn --> minocyline --> bactrim. In addition, he also has had multiple infections in the past E.faeclis (3/2024, 2023) and E. Coli (2023, 2022, 2022).     In the ED, patient afebrile, hemodynamically stable, s/p 1L LR bolus, minocycline 200mg, bactrim SS 1x, DS 1x.  (19 Dec 2024 16:48)    Interval heme/onc history:  - follows with Dr[facility] Mc[facility] at [facility] for MDS with 5q deletion  Interview 24:  - pt reports bruising in his legs but otherwise feels well, no acute complaints    REVIEW OF SYSTEMS:  As per HPI    PAST MEDICAL & SURGICAL HISTORY:  HTN - Hypertension  Hypercholesterolemia  PC (prostate cancer) s/p surgical resection 3 years ago  Gout  Aneurysm, ascending aorta    H/O prostatectomy  H/O carotid endarterectomy  H/O renal artery stenosis s/p stent in Left RA  Status post cataract extraction, unspecified laterality      MEDICATIONS  (STANDING):  acyclovir   Oral Tab/Cap 400 milliGRAM(s) Oral two times a day  allopurinol 200 milliGRAM(s) Oral daily  aspirin  chewable 81 milliGRAM(s) Oral daily  clopidogrel Tablet 75 milliGRAM(s) Oral daily  fluconAZOLE   Tablet 200 milliGRAM(s) Oral daily  gabapentin 300 milliGRAM(s) Oral three times a day  heparin   Injectable 5000 Unit(s) SubCutaneous every 8 hours  influenza  Vaccine (HIGH DOSE) 0.5 milliLiter(s) IntraMuscular once  pantoprazole    Tablet 40 milliGRAM(s) Oral before breakfast  rosuvastatin 10 milliGRAM(s) Oral at bedtime  sertraline 25 milliGRAM(s) Oral daily  trimethoprim / sulfamethoxazole IVPB 330 milliGRAM(s) IV Intermittent every 12 hours    MEDICATIONS  (PRN):      Allergies    No Known Allergies    Intolerances          VITAL SIGNS:  Vital Signs Last 24 Hrs  T(C): 37.2 (19 Dec 2024 21:10), Max: 37.2 (19 Dec 2024 21:10)  T(F): 99 (19 Dec 2024 21:10), Max: 99 (19 Dec 2024 21:10)  HR: 104 (19 Dec 2024 21:10) (90 - 113)  BP: 124/78 (19 Dec 2024 21:10) (124/78 - 141/80)  BP(mean): --  RR: 18 (19 Dec 2024 21:10) (16 - 20)  SpO2: 96% (19 Dec 2024 21:10) (96% - 100%)    Parameters below as of 19 Dec 2024 21:10  Patient On (Oxygen Delivery Method): room air    PHYSICAL EXAMINATION:  Constitutional: resting comfortably in bed; NAD  HEENT: NC/AT, EOMI, anicteric sclera, no nasal discharge  Respiratory: breathing comfortably on room air  Gastrointestinal: abdomen soft, distended, no tenderness to palpation   Extremities: numerous bruises / skin cuts in both feet  Neurologic: alert and oriented, no focal deficits noted  Psychiatric: affect and characteristics of appearance, verbalizations, behaviors are appropriate    LABS:                        9.6    6.08  )-----------( 206      ( 19 Dec 2024 11:01 )             29.8     12-    137  |  99  |  34[H]  ----------------------------<  121[H]  3.7   |  22  |  2.06[H]    Ca    8.6      19 Dec 2024 11:01    TPro  6.7  /  Alb  3.6  /  TBili  0.4  /  DBili  x   /  AST  14  /  ALT  10  /  AlkPhos  85  12-19    PT/INR - ( 19 Dec 2024 11:01 )   PT: 12.7 sec;   INR: 1.11 ratio         PTT - ( 19 Dec 2024 11:01 )  PTT:28.4 sec  Urinalysis Basic - ( 19 Dec 2024 13:07 )    Color: Yellow / Appearance: Clear / S.012 / pH: x  Gluc: x / Ketone: Negative mg/dL  / Bili: Negative / Urobili: 0.2 mg/dL   Blood: x / Protein: 30 mg/dL / Nitrite: Negative   Leuk Esterase: Moderate / RBC: 0 /HPF / WBC 88 /HPF   Sq Epi: x / Non Sq Epi: 1 /HPF / Bacteria: Many /HPF        RADIOLOGY & ADDITIONAL STUDIES:      IMPRESSION & RECOMMENDATIONS:

## 2024-12-19 NOTE — ED ADULT TRIAGE NOTE - BIRTH SEX
Male Consent was obtained from the patient. The risks and benefits to therapy were discussed in detail. Specifically, the risks of infection, scarring, bleeding, prolonged wound healing, incomplete removal, allergy to anesthesia, nerve injury and recurrence were addressed. Prior to the procedure, the treatment site was clearly identified and confirmed by the patient. All components of Universal Protocol/PAUSE Rule completed.

## 2024-12-19 NOTE — H&P ADULT - NSHPLABSRESULTS_GEN_ALL_CORE
9.6    6.08  )-----------( 206      ( 19 Dec 2024 11:01 )             29.8       12    137  |  99  |  34[H]  ----------------------------<  121[H]  3.7   |  22  |  2.06[H]    Ca    8.6      19 Dec 2024 11:01    TPro  6.7  /  Alb  3.6  /  TBili  0.4  /  DBili  x   /  AST  14  /  ALT  10  /  AlkPhos  85            Urinalysis Basic - ( 19 Dec 2024 13:07 )    Color: Yellow / Appearance: Clear / S.012 / pH: x  Gluc: x / Ketone: Negative mg/dL  / Bili: Negative / Urobili: 0.2 mg/dL   Blood: x / Protein: 30 mg/dL / Nitrite: Negative   Leuk Esterase: Moderate / RBC: 0 /HPF / WBC 88 /HPF   Sq Epi: x / Non Sq Epi: 1 /HPF / Bacteria: Many /HPF      PT/INR - ( 19 Dec 2024 11:01 )   PT: 12.7 sec;   INR: 1.11 ratio         PTT - ( 19 Dec 2024 11:01 )  PTT:28.4 sec

## 2024-12-19 NOTE — PATIENT PROFILE ADULT - FALL HARM RISK - HARM RISK INTERVENTIONS

## 2024-12-19 NOTE — H&P ADULT - PROBLEM SELECTOR PLAN 3
Plan  - c/w home levaquin Pt with chronic UTI, found to have prior UCx positive for E. faecalis, E. Coli   - Abx as per above

## 2024-12-19 NOTE — ED CLERICAL - NS ED CLERK NOTE PRE-ARRIVAL INFORMATION; ADDITIONAL PRE-ARRIVAL INFORMATION
Hx MDS on day 2 of current dacogen cycle  monday started complaining of L foot swelling and inability to ambulate, XR yesterday negative  also complaining of "chills" after flushing PICC line so they mike BC yesterday which were positive for gram neg rods (sensitivity not back yet)  Please consult heme

## 2024-12-19 NOTE — ED PROVIDER NOTE - OBJECTIVE STATEMENT
84-year-old Male with a history of myelodysplastic syndrome on chemo (last session 12/18), CAD, CKD, HTN, HLD, who presents with positive blood cultures for stenotrophomonas maltophilia on 12/18/24 and referred to the ED for further evaluation this afternoon. Patient reports having blood drawn prior to his chemo session yesterday as well as left ankle xray 2/2 to pain. The patient reports mild left ankle pain and believes it may be sprained. Currently on Levaquin for a UTI. Denies fever, chills, nausea, vomiting, chest pain, shortness of breath, cough, abdominal pain. No respiratory distress. No stridor, Lungs sounds clear with good aeration bilaterally.

## 2024-12-19 NOTE — ED PROVIDER NOTE - PROGRESS NOTE DETAILS
Positive UTI. BUN and creatinine elevated at 32 and 2.06. lateral to 12/16 labs.   Spoke to IV pharmacist. Recommended minocycline PO instead of tigecycline as well as additional Bactrim for total of 240 trimethoprim.  Will require admission for further treatement and management.   Patient amendable to plan.

## 2024-12-19 NOTE — ED ADULT TRIAGE NOTE - CHIEF COMPLAINT QUOTE
Instructed to come to ED due to positive blood cultures; currently undergoing chemotherapy treatment for MVS.

## 2024-12-19 NOTE — ED ADULT NURSE NOTE - OBJECTIVE STATEMENT
83 yo male presents to the ED AAox4 presents to the ED AAox4 ambulatory with positive blood culutres. On chemo for MVS.  Pt Denies headache, dizziness, vision changes, chest pain, shortness of breath, abdominal pain, nausea, vomiting, diarrhea, fevers, chills, dysuria, hematuria, recent illness travel or fall. 1

## 2024-12-19 NOTE — CONSULT NOTE ADULT - ASSESSMENT
84M with myelodysplastic syndrome on chemo (last session 12/18), presents for positive blood cultures for stenotrophomonas maltophilia on 12/18/24     PMH: CAD, CKD, HTN, HLD  Labs/Imaging:  CBC (12/19/24): WBC 6.08, Hgb 9.6, Plt 206  CXR (12/19): clear lungs  PT/INR, PTT WNL (12/19)  UA (+) for UTI    #MDS  Recommend:  - hold dacogen while in hospital  - infectious w/u and treatment per primary team  - pt to f/u with outpatient hematologist Dr. Mcmahon after discharge- please notify our team prior to discharge    Hematology to sign off. Please call back with any questions, concerns, or changes in clinical status.    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 (last sessions 12/16, 12/18), presents for positive blood cultures for Stenotrophomonas maltophilia on 12/18/24     PMH: CAD, CKD, HTN, HLD  Labs/Imaging:  CBC (12/19/24): WBC 6.08, Hgb 9.6, Plt 206  CXR (12/19): clear lungs  PT/INR, PTT WNL (12/19)  UA (+) for UTI    #MDS  Recommend:  - hold dacogen while in hospital  - infectious w/u and treatment per primary team (labs with evidence of UTI here and pt also with frequent UTI symptoms as outpatient) - consider urology consult for recurrent UTIs in male  - please continue antifungal and antiviral prophylaxis (pt on fluconazole 200mg daily and acyclovir 400mg BID as outpatient); pt also normally on levofloxacin 500mg daily as outpatient- can hold while on broad spectrum antibiotics   - pt to f/u with outpatient hematologist Dr. Mcmahon after discharge- please notify our team prior to discharge    Hematology to sign off. Please call back with any questions, concerns, or changes in clinical status.    Recommendations preliminary until attending attestation   ***************************************************************  Kailash Clark MD  Hematology/Oncology Fellow, PGY5  MS TEAMS  After 5pm or on weekends please contact  to page on-call fellow   ***************************************************************

## 2024-12-19 NOTE — ED PROVIDER NOTE - ATTENDING CONTRIBUTION TO CARE
I performed a history and physical exam of the patient and discussed their management with the resident and /or advanced care provider. I reviewed the resident and /or ACP's note and agree with the documented findings and plan of care. My medical decision making and observations are found above.  Lungs clear, abd soft, ankle on lt warm and tender to move.

## 2024-12-19 NOTE — CONSULT NOTE ADULT - ATTENDING COMMENTS
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 (last sessions 12/16, 12/18), presents for positive blood cultures for Stenotrophomonas maltophilia on 12/18/24     PMH: CAD, CKD, HTN, HLD  Labs/Imaging:  CBC (12/19/24): WBC 6.08, Hgb 9.6, Plt 206  CXR (12/19): clear lungs  PT/INR, PTT WNL (12/19)  UA (+) for UTI    #MDS  Recommend:  - hold Dacogen while in hospital  - infectious w/u and treatment per primary team (labs with evidence of UTI here and pt also with frequent UTI symptoms as outpatient) - consider urology consult for recurrent UTIs in male  - please continue antifungal and antiviral prophylaxis (pt on fluconazole 200mg daily and acyclovir 400mg BID as outpatient); pt also normally on levofloxacin 500mg daily as outpatient- can hold while on broad spectrum antibiotics

## 2024-12-19 NOTE — H&P ADULT - PROBLEM SELECTOR PLAN 1
Pt had two separate blood cultures that were positive Stenotrophomonas maltophilia. He previously was tested positive for this in June 2024 and was also positive for MRSE. He was treated with vancomycin--> zosyn --> minocyline --> bactrim and was seen by ID at the time for treatment plan.    Plan  - f/u ID reccs  - start Bactrim   - continue to trend CBC and vital signs Pt had two separate blood cultures that were positive Stenotrophomonas maltophilia. He previously was tested positive for this in June 2024 and was also positive for MRSE. He was treated with vancomycin--> zosyn --> minocyline --> bactrim and was seen by ID at the time for treatment plan.    Plan  - f/u ID reccs  - start Bactrim   - continue to trend CBC and vital signs  - F/u BCx, sensitivities

## 2024-12-19 NOTE — H&P ADULT - ASSESSMENT
Mr. Moya is a 84-year-old Male with a history of myelodysplastic syndrome on chemo (last session 12/18), CAD, CKD, HTN, HLD, who presents with positive blood cultures for Stenotrophomonas maltophilia on 12/18/24 and referred to the ED for further evaluation this afternoon. He reports feeling well and has noted anything new. He denies any fever, chills, chest pain, shortness of breath.

## 2024-12-19 NOTE — H&P ADULT - PROBLEM SELECTOR PLAN 4
Plan  - continue with home meds Plan  - Consult heme/onc, f/u recs  -C/w prophylaxis with acyclovir, fluconazole

## 2024-12-19 NOTE — H&P ADULT - NSHPREVIEWOFSYSTEMS_GEN_ALL_CORE
Review of Systems:  General: no fever or weight loss  Skin: no rashes or itch.   HEENT: no trauma or HA. No drainage or bleeding. No changes in hearing. No lumps or stiffness.  CV: no chest pain or palpitations  Pulm: no SOB, cough, or wheezing  GI: no nausea, vomiting, or abd pain  Urinary: no incontinence or dysuria  Neuro: no confusion, seizure, or numbness  MSK: no weakness, stiffness, or swelling besides the right leg feels heavier than usual

## 2024-12-19 NOTE — H&P ADULT - PROBLEM SELECTOR PLAN 5
Plan  - continue with home meds  - avoid nephrotoxic foods and medications Plan  - Renally dose all medications  -Avoid nephrotoxins, NSAIDS

## 2024-12-19 NOTE — H&P ADULT - PROBLEM SELECTOR PLAN 2
Pt reports pain the left ankle. X-ray was completed and did not demonstrate fracture.    Plan  - c/w home Tylenol for pain control Pt reports pain the left ankle. X-ray was completed and did not demonstrate fracture.    Plan  - c/w home Tylenol for pain control  -Wound consult

## 2024-12-20 ENCOUNTER — APPOINTMENT (OUTPATIENT)
Dept: INFUSION THERAPY | Facility: HOSPITAL | Age: 84
End: 2024-12-20

## 2024-12-20 LAB
-  LEVOFLOXACIN: SIGNIFICANT CHANGE UP
-  TRIMETHOPRIM/SULFAMETHOXAZOLE: SIGNIFICANT CHANGE UP
ALBUMIN SERPL ELPH-MCNC: 3.3 G/DL — SIGNIFICANT CHANGE UP (ref 3.3–5)
ALP SERPL-CCNC: 74 U/L — SIGNIFICANT CHANGE UP (ref 40–120)
ALT FLD-CCNC: 10 U/L — SIGNIFICANT CHANGE UP (ref 10–45)
ANION GAP SERPL CALC-SCNC: 15 MMOL/L — SIGNIFICANT CHANGE UP (ref 5–17)
AST SERPL-CCNC: 14 U/L — SIGNIFICANT CHANGE UP (ref 10–40)
BASOPHILS # BLD AUTO: 0.06 K/UL — SIGNIFICANT CHANGE UP (ref 0–0.2)
BASOPHILS NFR BLD AUTO: 1 % — SIGNIFICANT CHANGE UP (ref 0–2)
BILIRUB SERPL-MCNC: 0.3 MG/DL — SIGNIFICANT CHANGE UP (ref 0.2–1.2)
BUN SERPL-MCNC: 31 MG/DL — HIGH (ref 7–23)
CALCIUM SERPL-MCNC: 8.4 MG/DL — SIGNIFICANT CHANGE UP (ref 8.4–10.5)
CHLORIDE SERPL-SCNC: 102 MMOL/L — SIGNIFICANT CHANGE UP (ref 96–108)
CO2 SERPL-SCNC: 22 MMOL/L — SIGNIFICANT CHANGE UP (ref 22–31)
CREAT SERPL-MCNC: 2.14 MG/DL — HIGH (ref 0.5–1.3)
EGFR: 30 ML/MIN/1.73M2 — LOW
EOSINOPHIL # BLD AUTO: 0.13 K/UL — SIGNIFICANT CHANGE UP (ref 0–0.5)
EOSINOPHIL NFR BLD AUTO: 2.1 % — SIGNIFICANT CHANGE UP (ref 0–6)
GLUCOSE SERPL-MCNC: 118 MG/DL — HIGH (ref 70–99)
GRAM STN FLD: ABNORMAL
GRAM STN FLD: ABNORMAL
HCT VFR BLD CALC: 28.3 % — LOW (ref 39–50)
HGB BLD-MCNC: 9.2 G/DL — LOW (ref 13–17)
IMM GRANULOCYTES NFR BLD AUTO: 3.2 % — HIGH (ref 0–0.9)
LYMPHOCYTES # BLD AUTO: 0.68 K/UL — LOW (ref 1–3.3)
LYMPHOCYTES # BLD AUTO: 11 % — LOW (ref 13–44)
MAGNESIUM SERPL-MCNC: 2.2 MG/DL — SIGNIFICANT CHANGE UP (ref 1.6–2.6)
MCHC RBC-ENTMCNC: 32.5 G/DL — SIGNIFICANT CHANGE UP (ref 32–36)
MCHC RBC-ENTMCNC: 36.4 PG — HIGH (ref 27–34)
MCV RBC AUTO: 111.9 FL — HIGH (ref 80–100)
METHOD TYPE: SIGNIFICANT CHANGE UP
MONOCYTES # BLD AUTO: 0.49 K/UL — SIGNIFICANT CHANGE UP (ref 0–0.9)
MONOCYTES NFR BLD AUTO: 7.9 % — SIGNIFICANT CHANGE UP (ref 2–14)
NEUTROPHILS # BLD AUTO: 4.63 K/UL — SIGNIFICANT CHANGE UP (ref 1.8–7.4)
NEUTROPHILS NFR BLD AUTO: 74.8 % — SIGNIFICANT CHANGE UP (ref 43–77)
NRBC # BLD: 0 /100 WBCS — SIGNIFICANT CHANGE UP (ref 0–0)
PHOSPHATE SERPL-MCNC: 3.6 MG/DL — SIGNIFICANT CHANGE UP (ref 2.5–4.5)
PLATELET # BLD AUTO: 190 K/UL — SIGNIFICANT CHANGE UP (ref 150–400)
POTASSIUM SERPL-MCNC: 3.8 MMOL/L — SIGNIFICANT CHANGE UP (ref 3.5–5.3)
POTASSIUM SERPL-SCNC: 3.8 MMOL/L — SIGNIFICANT CHANGE UP (ref 3.5–5.3)
PROT SERPL-MCNC: 6.1 G/DL — SIGNIFICANT CHANGE UP (ref 6–8.3)
RBC # BLD: 2.53 M/UL — LOW (ref 4.2–5.8)
RBC # FLD: 17.9 % — HIGH (ref 10.3–14.5)
SODIUM SERPL-SCNC: 139 MMOL/L — SIGNIFICANT CHANGE UP (ref 135–145)
SPECIMEN SOURCE: SIGNIFICANT CHANGE UP
WBC # BLD: 6.19 K/UL — SIGNIFICANT CHANGE UP (ref 3.8–10.5)
WBC # FLD AUTO: 6.19 K/UL — SIGNIFICANT CHANGE UP (ref 3.8–10.5)

## 2024-12-20 PROCEDURE — 99222 1ST HOSP IP/OBS MODERATE 55: CPT

## 2024-12-20 PROCEDURE — 99232 SBSQ HOSP IP/OBS MODERATE 35: CPT | Mod: GC

## 2024-12-20 RX ORDER — MINOCYCLINE HYDROCHLORIDE 100 MG/1
200 CAPSULE ORAL
Refills: 0 | Status: DISCONTINUED | OUTPATIENT
Start: 2024-12-20 | End: 2024-12-27

## 2024-12-20 RX ORDER — SULFAMETHOXAZOLE/TRIMETHOPRIM 800-160 MG
200 TABLET ORAL
Refills: 0 | Status: DISCONTINUED | OUTPATIENT
Start: 2024-12-20 | End: 2024-12-20

## 2024-12-20 RX ORDER — CHLORHEXIDINE GLUCONATE 1.2 MG/ML
1 RINSE ORAL DAILY
Refills: 0 | Status: DISCONTINUED | OUTPATIENT
Start: 2024-12-20 | End: 2024-12-27

## 2024-12-20 RX ORDER — SULFAMETHOXAZOLE/TRIMETHOPRIM 800-160 MG
200 TABLET ORAL EVERY 12 HOURS
Refills: 0 | Status: DISCONTINUED | OUTPATIENT
Start: 2024-12-20 | End: 2024-12-20

## 2024-12-20 RX ORDER — CADEXOMER IODINE 0.9 %
1 GEL (GRAM) TOPICAL DAILY
Refills: 0 | Status: DISCONTINUED | OUTPATIENT
Start: 2024-12-20 | End: 2024-12-27

## 2024-12-20 RX ORDER — SULFAMETHOXAZOLE/TRIMETHOPRIM 800-160 MG
3 TABLET ORAL THREE TIMES A DAY
Refills: 0 | Status: DISCONTINUED | OUTPATIENT
Start: 2024-12-20 | End: 2024-12-23

## 2024-12-20 RX ORDER — CADEXOMER IODINE 0.9 %
1 GEL (GRAM) TOPICAL DAILY
Refills: 0 | Status: DISCONTINUED | OUTPATIENT
Start: 2024-12-20 | End: 2024-12-20

## 2024-12-20 RX ADMIN — Medication 520.62 MILLIGRAM(S): at 05:53

## 2024-12-20 RX ADMIN — GABAPENTIN 300 MILLIGRAM(S): 300 CAPSULE ORAL at 05:53

## 2024-12-20 RX ADMIN — GABAPENTIN 300 MILLIGRAM(S): 300 CAPSULE ORAL at 21:40

## 2024-12-20 RX ADMIN — PANTOPRAZOLE 40 MILLIGRAM(S): 40 TABLET, DELAYED RELEASE ORAL at 12:24

## 2024-12-20 RX ADMIN — Medication 3 TABLET(S): at 18:46

## 2024-12-20 RX ADMIN — Medication 400 MILLIGRAM(S): at 05:53

## 2024-12-20 RX ADMIN — FLUCONAZOLE 200 MILLIGRAM(S): 200 TABLET ORAL at 12:23

## 2024-12-20 RX ADMIN — ALLOPURINOL 200 MILLIGRAM(S): 100 TABLET ORAL at 12:23

## 2024-12-20 RX ADMIN — CLOPIDOGREL BISULFATE 75 MILLIGRAM(S): 75 TABLET, FILM COATED ORAL at 12:23

## 2024-12-20 RX ADMIN — SERTRALINE HYDROCHLORIDE 25 MILLIGRAM(S): 25 TABLET ORAL at 12:23

## 2024-12-20 RX ADMIN — HEPARIN SODIUM 5000 UNIT(S): 1000 INJECTION, SOLUTION INTRAVENOUS; SUBCUTANEOUS at 21:40

## 2024-12-20 RX ADMIN — Medication 81 MILLIGRAM(S): at 12:17

## 2024-12-20 RX ADMIN — HEPARIN SODIUM 5000 UNIT(S): 1000 INJECTION, SOLUTION INTRAVENOUS; SUBCUTANEOUS at 15:05

## 2024-12-20 RX ADMIN — Medication 3 TABLET(S): at 21:44

## 2024-12-20 RX ADMIN — GABAPENTIN 300 MILLIGRAM(S): 300 CAPSULE ORAL at 15:05

## 2024-12-20 RX ADMIN — Medication 400 MILLIGRAM(S): at 18:44

## 2024-12-20 RX ADMIN — ROSUVASTATIN 10 MILLIGRAM(S): 40 TABLET, FILM COATED ORAL at 21:39

## 2024-12-20 RX ADMIN — HEPARIN SODIUM 5000 UNIT(S): 1000 INJECTION, SOLUTION INTRAVENOUS; SUBCUTANEOUS at 05:53

## 2024-12-20 RX ADMIN — MINOCYCLINE HYDROCHLORIDE 200 MILLIGRAM(S): 100 CAPSULE ORAL at 18:43

## 2024-12-20 NOTE — DISCHARGE NOTE PROVIDER - CARE PROVIDER_API CALL
Maksim Reddy Les  Internal Medicine  [address] [place] [address], [facility] [address]  [place] [address]  Phone: [phone]  Fax: [phone]  Established Patient  Follow Up Time: 1 week

## 2024-12-20 NOTE — DISCHARGE NOTE PROVIDER - NSDCCPTREATMENT_GEN_ALL_CORE_FT
PRINCIPAL PROCEDURE  Procedure: Chest xray, PA & lateral  Findings and Treatment:   ACC: [id number] EXAM:  XR CHEST PA LAT 2V   ORDERED BY: ADE CM   PROCEDURE DATE:  12/19/2024    INTERPRETATION:  EXAMINATION: XR CHEST PA AND LATERAL  CLINICAL INDICATION: Sepsis  TECHNIQUE: 2 views; Frontal and lateral views of the chest were obtained.  COMPARISON: Chest x-ray 6/17/2024.  FINDINGS:  Right upper extremity PICC terminates in the SVC. Surgical clips in the   neck are noted.  The heart is normal in size.  The lungs are clear.  There is no pneumothorax or pleural effusion.  No acute osseous abnormalities. Mild bilateral glenohumeral joint   arthrosis. Degenerative changes of the thoracic spine.  IMPRESSION:  Clear lungs.  --- End of Report ---  BELKIS ANGELES DO; Resident Radiologist  This document has been electronically signed.  NAHID KOVACS MD; Attending Radiologist  This document has been electronically signed. Dec 19 2024 11:29AM       PRINCIPAL PROCEDURE  Procedure: Chest xray, PA & lateral  Findings and Treatment:   ACC: [id number] EXAM:  XR CHEST PA LAT 2V   ORDERED BY: ADE CM   PROCEDURE DATE:  12/19/2024    INTERPRETATION:  EXAMINATION: XR CHEST PA AND LATERAL  CLINICAL INDICATION: Sepsis  TECHNIQUE: 2 views; Frontal and lateral views of the chest were obtained.  COMPARISON: Chest x-ray 6/17/2024.  FINDINGS:  Right upper extremity PICC terminates in the SVC. Surgical clips in the   neck are noted.  The heart is normal in size.  The lungs are clear.  There is no pneumothorax or pleural effusion.  No acute osseous abnormalities. Mild bilateral glenohumeral joint   arthrosis. Degenerative changes of the thoracic spine.  IMPRESSION:  Clear lungs.  --- End of Report ---  BELKIS ANGELES DO; Resident Radiologist  This document has been electronically signed.  NAHID KOVACS MD; Attending Radiologist  This document has been electronically signed. Dec 19 2024 11:29AM        SECONDARY PROCEDURE  Procedure: Complete transthoracic echocardiography (TTE)  Findings and Treatment:   Conclusions:  1. Mitral annular calcification, otherwise normal mitral  valve. Mild mitral regurgitation.  2. Mildly calcified trileaflet aortic valve with normal  opening.  A small linear strand is noted on the left  ventricular side of the aortic leaflets consistent with a  Lambi's excresence. No aortic valve regurgitation seen.  3. Normal aortic root diameter.  Severe ulcerated complex  plaque is visualized in the mid to distal aortic arch and  descending thoracic aorta measuring 0.5-0.8cm in thickness.   No mobile elements visualized.  4. Increased relative wall thickness with normal left  ventricular mass index, consistent with concentric left  ventricular remodeling.  5. Normal left ventricular systolic function. No segmental  wall motion abnormalities.  Ejection fraction is 55%.  6. Mild diastolic dysfunction (Stage I).  7. Estimated right ventricular systolic pressure equals 39  mm Hg, assuming right atrial pressure equals 10 mm Hg,  consistent with borderline pulmonary hypertension.  8. Agitated saline injection demonstrates no evidence of a  patent foramen ovale.

## 2024-12-20 NOTE — PHYSICAL THERAPY INITIAL EVALUATION ADULT - ADDITIONAL COMMENTS
pt lives alone but has a 24/7 HHA in a  with 6 PUSHPA +handrail. he sleeps on the first floor, but his bathroom is one the 2nd floor. there are 13 stesp +handrail to the 2nd floor. pt ambulates with a rw with supervision from [facility]. pt owns a rw, cane, shower chair and wc. pt lives alone but has a 24/7 HHA in a  with 6 PUSHPA +handrail. he sleeps on the first floor, but his bathroom is one the 2nd floor. there are 13 steps +handrail to the 2nd floor. pt ambulates with a rw with supervision from A. pt owns a rw, cane, shower chair and wc., hospital bed

## 2024-12-20 NOTE — PHYSICAL THERAPY INITIAL EVALUATION ADULT - BALANCE TRAINING, PT EVAL
GOAL: Patient will demonstrate an increase in static/dynamic balance in sitting/standing where deficient by at least 1 grade to facilitate greater independence during functional mobility and ADL's in 2 weeks GOAL: Patient will demonstrate an increase in static/dynamic balance in sitting/standing where deficient by at least 1 grade to facilitate greater independence during functional mobility and ADL's in 3-4 weeks

## 2024-12-20 NOTE — PHYSICAL THERAPY INITIAL EVALUATION ADULT - LEVEL OF INDEPENDENCE: SIT/STAND, REHAB EVAL
attempted, unable to perform due to pain attempted, unable to perform due to pain/moderate assist (50% patients effort)

## 2024-12-20 NOTE — CONSULT NOTE ADULT - SUBJECTIVE AND OBJECTIVE BOX
HPI: Mr. Moya is an 84 year-old man with history of multiple medical issues including hypertension, gout, congestive heart failure with reduced EF, moderate aortic stenosis, renal artery stenosis with associated stage 4 chronic kidney disease, and myelodysplastic syndrome on chemotherapy (last session 12/18). He is well-known to me from the outpatient setting. He presented yesterday to the [facility] ER after outpatient blood cultures returned positive for stenotrophomonas maltophilia. In the ER, Mr. Moya was afebrile and hemodynamically stable. He received a 1L LR bolus, minocycline 200mg, and Bactrim. ID was called; he is presently on standing IV Bactrim.        PMHX/PSHX:  HTN  HLD  Gout  HFrEF  Moderate aortic stenosis  Prostate CA - resection  Ascending aortic aneurysm  Carotid stenosis - CEA  Renal artery stenosis - L renal stent  CKD4  Cataract extraction  Myelodysplastic syndrome - on chemotherapy    Allergies  No Known Allergies    SOCIAL HISTORY:  Denies ETOh,Smoking,     FAMILY HISTORY:  No CKD    REVIEW OF SYSTEMS:  CONSTITUTIONAL: No weakness, fevers or chills  EYES/ENT: No visual changes;  No vertigo or throat pain   NECK: No pain or stiffness  RESPIRATORY: No cough, wheezing, hemoptysis; No shortness of breath  CARDIOVASCULAR: No chest pain or palpitations  GASTROINTESTINAL: No abdominal or epigastric pain. No nausea, vomiting, or hematemesis; No diarrhea or constipation. No melena or hematochezia.  GENITOURINARY: No dysuria, frequency or hematuria  NEUROLOGICAL: L ankle pain  SKIN: No itching, burning, rashes, or lesions   All other review of systems is negative unless indicated above.    VITAL:  T(C): , Max: 37.8 (12-20-24 @ 01:40)  T(F): , Max: 100.1 (12-20-24 @ 01:40)  HR: 88 (12-20-24 @ 11:48)  BP: 128/72 (12-20-24 @ 11:48)  RR: 18 (12-20-24 @ 11:48)  SpO2: 94% (12-20-24 @ 11:48)    PHYSICAL EXAM:  Constitutional: Frail; mildly confused  HEENT: NCAT, DMM  Neck: Supple, No JVD  Respiratory: CTA-b/l  Cardiovascular: RRR s1s2, (+)1/6 LESA  Gastrointestinal: BS+, soft, NT/ND  Extremities: No peripheral edema b/l  Neurological: reduced generalized strength  Back: no CVAT b/l  Skin: No rashes, no nevi      LABS:                        9.2    6.19  )-----------( 190      ( 20 Dec 2024 06:36 )             28.3     Na(139)/K(3.8)/Cl(102)/HCO3(22)/BUN(31)/Cr(2.14)Glu(118)/Ca(8.4)/Mg(2.2)/PO4(3.6)    12-20 @ 06:37  Na(137)/K(3.7)/Cl(99)/HCO3(22)/BUN(34)/Cr(2.06)Glu(121)/Ca(8.6)/Mg(--)/PO4(--)    12-19 @ 11:01    (10/7/24) - BUN 24, Cr 2.03    IMAGING:  < from: Xray Chest 2 Views PA/Lat (12.19.24 @ 11:19) >  Clear lungs.    < from: MR MRCP w/wo IV Cont (06.11.24 @ 23:15) >  No choledocholithiasis.  Cholelithiasis with nonspecific edematous thickening of the gallbladder wall.  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 Follow-up MR/MRCP of contrast in 2 years is   recommended to assess for stability.  KIDNEYS/URETERS: Atrophic right kidney. Bilateral renal cortical   scarring. Left renal cyst. No hydronephrosis.    < from: TTE W or WO Ultrasound Enhancing Agent (06.11.24 @ 12:09) >  LV Wall Scoring: There is diffuse hypokinesis.   Right Ventricle:  The right ventricular cavity is normal in size, with normal wall thickness and reduced systolic function. Tricuspid annular plane systolic excursion (TAPSE) is 1.6 cm (normal >=1.7 cm).  Left Atrium:  The left atrium is severely dilated with an indexed volume of 51.46 ml/m².   Right Atrium:  The right atrium is normal in size with an indexed volume of 22.82 ml/m².   Interatrial Septum:  The interatrial septum appears intact.   Aortic Valve:  The aortic valve anatomy cannot be determined with reduced systolic excursion. There is moderate to severe aortic stenosis. The peak transaortic velocity is 2.82 m/s, peak transaortic gradient is 31.8 mmHg andmean transaortic gradient is 19.0 mmHg with an LVOT/aortic valve VTI ratio of 0.29. The aortic valve area is estimated at 1.00 cm² by the continuity equation. There is trace aortic regurgitation.   Mitral Valve:  Structurally normal mitral valve with normal leaflet excursion. There is calcification of the mitral valve annulus. There is no mitral valve stenosis. There is mild to moderate mitral regurgitation.   Tricuspid Valve:  Structurally normal tricuspid valve with normal leaflet excursion. There is mild tricuspid regurgitation. Estimated pulmonary artery systolic pressure is 21 mmHg.   Pulmonic Valve:  Structurally normal pulmonic valve with normal leaflet excursion. There is trace pulmonic regurgitation.   Aorta:  The aortic rootappears normal in size. The ascending aorta diameter is dilated, measuring 4.10 cm (indexed 1.99 cm/m²).   Pericardium:  No pericardial effusion seen.   Systemic Veins:  The inferior vena cava is normal in size measuring 1.60 cm in diameter, (normal <2.1cm) with normal inspiratory collapse (normal >50%) consistent with normal right atrial pressure (~3, range 0-5mmHg).      IMPRESSION:  (1)Renal - CKD4 - USHA-associated - at/near baseline GFR  (2)Lytes - acceptable  (3)ID -stenotrophomonas bacteremia - ID on board - on IV Bactrim  (4)CV - acceptable BP/volume    RECOMMEND:  (1)Abx for GFR 20-30ml/min  (2)BMP q1-2 days    Thank you for involving [facility] in this patient's care.    With warm regards,    Edis Cabral MD   [facility]  Office: [phone]  Cell: [phone]                 HPI: Mr. Moya is an 84 year-old man with history of multiple medical issues including hypertension, gout, congestive heart failure with reduced EF, moderate aortic stenosis, renal artery stenosis with associated stage 4 chronic kidney disease, and myelodysplastic syndrome on chemotherapy (last session 12/18). He is well-known to me from the outpatient setting. He presented yesterday to the [facility] ER after outpatient blood cultures returned positive for stenotrophomonas maltophilia. In the ER, Mr. Moya was afebrile and hemodynamically stable. He received a 1L LR bolus, minocycline 200mg, and Bactrim. ID was called; he is presently on standing IV Bactrim.    Mr. Moya admits to (chronic) fatigue; he has no other complaints at present. He tells me that his RUE PICC has been removed since admission.    PMHX/PSHX:  HTN  HLD  Gout  HFrEF  Moderate aortic stenosis  Prostate CA - resection  Ascending aortic aneurysm  Carotid stenosis - CEA  Renal artery stenosis - L renal stent  CKD4  Cataract extraction  Myelodysplastic syndrome - on chemotherapy    Allergies  No Known Allergies    SOCIAL HISTORY:  Denies ETOh,Smoking,     FAMILY HISTORY:  No CKD    REVIEW OF SYSTEMS:  CONSTITUTIONAL: (+)fatigue; no fever or chills.  EYES/ENT: No visual changes;  No vertigo or throat pain   NECK: No pain or stiffness  RESPIRATORY: No cough, wheezing, hemoptysis; No shortness of breath  CARDIOVASCULAR: No chest pain or palpitations  GASTROINTESTINAL: No abdominal or epigastric pain. No nausea, vomiting, or hematemesis; No diarrhea or constipation. No melena or hematochezia.  GENITOURINARY: No dysuria, frequency or hematuria  NEUROLOGICAL: L ankle pain  SKIN: No itching, burning, rashes, or lesions   All other review of systems is negative unless indicated above.    VITAL:  T(C): , Max: 37.8 (12-20-24 @ 01:40)  T(F): , Max: 100.1 (12-20-24 @ 01:40)  HR: 88 (12-20-24 @ 11:48)  BP: 128/72 (12-20-24 @ 11:48)  RR: 18 (12-20-24 @ 11:48)  SpO2: 94% (12-20-24 @ 11:48)    PHYSICAL EXAM:  Constitutional: Frail; lethargic but alert, NAD  HEENT: NCAT, DMM  Neck: Supple, No JVD  Respiratory: CTA-b/l  Cardiovascular: RRR s1s2, (+)2/6 LESA  Gastrointestinal: BS+, soft, NT/ND  Extremities: No peripheral edema b/l  Neurological: reduced generalized strength  Back: no CVAT b/l  Skin: No rashes, no nevi      LABS:                        9.2    6.19  )-----------( 190      ( 20 Dec 2024 06:36 )             28.3     Na(139)/K(3.8)/Cl(102)/HCO3(22)/BUN(31)/Cr(2.14)Glu(118)/Ca(8.4)/Mg(2.2)/PO4(3.6)    12-20 @ 06:37  Na(137)/K(3.7)/Cl(99)/HCO3(22)/BUN(34)/Cr(2.06)Glu(121)/Ca(8.6)/Mg(--)/PO4(--)    12-19 @ 11:01    (10/7/24) - BUN 24, Cr 2.03    IMAGING:  < from: Xray Chest 2 Views PA/Lat (12.19.24 @ 11:19) >  Clear lungs.    < from: MR MRCP w/wo IV Cont (06.11.24 @ 23:15) >  No choledocholithiasis.  Cholelithiasis with nonspecific edematous thickening of the gallbladder wall.  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 Follow-up MR/MRCP of contrast in 2 years is   recommended to assess for stability.  KIDNEYS/URETERS: Atrophic right kidney. Bilateral renal cortical   scarring. Left renal cyst. No hydronephrosis.    < from: TTE W or WO Ultrasound Enhancing Agent (06.11.24 @ 12:09) >  LV Wall Scoring: There is diffuse hypokinesis.   Right Ventricle:  The right ventricular cavity is normal in size, with normal wall thickness and reduced systolic function. Tricuspid annular plane systolic excursion (TAPSE) is 1.6 cm (normal >=1.7 cm).  Left Atrium:  The left atrium is severely dilated with an indexed volume of 51.46 ml/m².   Right Atrium:  The right atrium is normal in size with an indexed volume of 22.82 ml/m².   Interatrial Septum:  The interatrial septum appears intact.   Aortic Valve:  The aortic valve anatomy cannot be determined with reduced systolic excursion. There is moderate to severe aortic stenosis. The peak transaortic velocity is 2.82 m/s, peak transaortic gradient is 31.8 mmHg andmean transaortic gradient is 19.0 mmHg with an LVOT/aortic valve VTI ratio of 0.29. The aortic valve area is estimated at 1.00 cm² by the continuity equation. There is trace aortic regurgitation.   Mitral Valve:  Structurally normal mitral valve with normal leaflet excursion. There is calcification of the mitral valve annulus. There is no mitral valve stenosis. There is mild to moderate mitral regurgitation.   Tricuspid Valve:  Structurally normal tricuspid valve with normal leaflet excursion. There is mild tricuspid regurgitation. Estimated pulmonary artery systolic pressure is 21 mmHg.   Pulmonic Valve:  Structurally normal pulmonic valve with normal leaflet excursion. There is trace pulmonic regurgitation.   Aorta:  The aortic rootappears normal in size. The ascending aorta diameter is dilated, measuring 4.10 cm (indexed 1.99 cm/m²).   Pericardium:  No pericardial effusion seen.   Systemic Veins:  The inferior vena cava is normal in size measuring 1.60 cm in diameter, (normal <2.1cm) with normal inspiratory collapse (normal >50%) consistent with normal right atrial pressure (~3, range 0-5mmHg).      IMPRESSION:  (1)Renal - CKD4 - USHA-associated - at/near baseline GFR  (2)Lytes - acceptable  (3)ID -stenotrophomonas bacteremia - ID on board - likely due to infected PICC - PICC removed - on IV Bactrim  (4)CV - acceptable BP/volume    RECOMMEND:  (1)Abx for GFR 20-30ml/min  (2)BMP q1-2 days    Thank you for involving [facility] in this patient's care.    With warm regards,    Edis Cabral MD   [facility]  Office: [phone]  Cell: [phone]

## 2024-12-20 NOTE — PROGRESS NOTE ADULT - SUBJECTIVE AND OBJECTIVE BOX
MASOOD MACARIO ([id number])- Patient is a 84y old  Male who presents with a chief complaint of abnormal lab finding (19 Dec 2024 21:19)      INTERVAL HPI/OVERNIGHT EVENTS:   Patient has no acute events overnight,     SUBJECTIVE: Pt seen at bedside; denies n/v, sob, chest pain.     PHYSICAL EXAM:  - GENERAL: Follows conversation appropriately. No acute distress.  - EYES: Tracks me in room.   - HENT: Moist mucous membranes. No scleral icterus.   - LUNGS: Clear to auscultation bilaterally. No accessory muscle use.  - CARDIOVASCULAR: Regular rate and rhythm. No murmur.  - ABDOMEN: Soft, non-tender and non-distended. No palpable masses.  - EXTREMITIES: No edema. Non-tender.  - SKIN: No rashes or lesions. Warm.  - NEUROLOGIC: No focal neurological deficits. CN II-XII grossly intact, but not individually tested.  - PSYCHIATRIC: Cooperative. Appropriate mood and affect.    Vital Signs Last 24 Hrs  T(C): 37.4 (20 Dec 2024 05:14), Max: 37.8 (20 Dec 2024 01:40)  T(F): 99.3 (20 Dec 2024 05:14), Max: 100.1 (20 Dec 2024 01:40)  HR: 94 (20 Dec 2024 05:14) (90 - 113)  BP: 127/71 (20 Dec 2024 05:14) (124/78 - 141/80)  BP(mean): --  RR: 18 (20 Dec 2024 05:14) (16 - 20)  SpO2: 90% (20 Dec 2024 05:14) (90% - 100%)    Parameters below as of 20 Dec 2024 05:14  Patient On (Oxygen Delivery Method): room air        LABS:    Culture - Blood (collected 12-18-24 @ 09:12)  Source: .Blood BLOOD  Gram Stain (12-19-24 @ 04:02):    Growth in aerobic bottle: Gram Negative Rods  Preliminary Report (12-19-24 @ 19:22):    Growth in aerobic bottle: Stenotrophomonas maltophilia    Direct identification is available within approximately 3-5    hours either by Blood Panel Multiplexed PCR or Direct    MALDI-TOF. Details: [email or web address]  Organism: Blood Culture PCR (12-19-24 @ 04:49)  Organism: Blood Culture PCR (12-19-24 @ 04:49)      Method Type: PCR      -  Stenotrophomonas maltophilia: Detec    Culture - Blood (collected 12-18-24 @ 09:11)  Source: .Blood BLOOD  Gram Stain (12-19-24 @ 12:12):    Growth in aerobic bottle: Gram Negative Rods  Preliminary Report (12-19-24 @ 12:12):    Growth in aerobic bottle: Gram Negative Rods                            9.6    6.08  )-----------( 206      ( 19 Dec 2024 11:01 )             29.8     12-20    139  |  102  |  31[H]  ----------------------------<  118[H]  3.8   |  22  |  2.14[H]    Ca    8.4      20 Dec 2024 06:37  Phos  3.6     12-20  Mg     2.2     12-20    TPro  6.1  /  Alb  3.3  /  TBili  0.3  /  DBili  x   /  AST  14  /  ALT  10  /  AlkPhos  74  12-20          Urinalysis Basic - ( 20 Dec 2024 06:37 )    Color: x / Appearance: x / SG: x / pH: x  Gluc: 118 mg/dL / Ketone: x  / Bili: x / Urobili: x   Blood: x / Protein: x / Nitrite: x   Leuk Esterase: x / RBC: x / WBC x   Sq Epi: x / Non Sq Epi: x / Bacteria: x      PT/INR - ( 19 Dec 2024 11:01 )   PT: 12.7 sec;   INR: 1.11 ratio         PTT - ( 19 Dec 2024 11:01 )  PTT:28.4 sec  Lactate Trend  12-19 @ 11:01 Lactate:1.3         CAPILLARY BLOOD GLUCOSE          Medications:  acyclovir   Oral Tab/Cap 400 milliGRAM(s) Oral two times a day  allopurinol 200 milliGRAM(s) Oral daily  aspirin  chewable 81 milliGRAM(s) Oral daily  clopidogrel Tablet 75 milliGRAM(s) Oral daily  fluconAZOLE   Tablet 200 milliGRAM(s) Oral daily  gabapentin 300 milliGRAM(s) Oral three times a day  heparin   Injectable 5000 Unit(s) SubCutaneous every 8 hours  influenza  Vaccine (HIGH DOSE) 0.5 milliLiter(s) IntraMuscular once  pantoprazole    Tablet 40 milliGRAM(s) Oral before breakfast  rosuvastatin 10 milliGRAM(s) Oral at bedtime  sertraline 25 milliGRAM(s) Oral daily  trimethoprim / sulfamethoxazole IVPB 330 milliGRAM(s) IV Intermittent every 12 hours      RADIOLOGY & ADDITIONAL TESTS were viewed by me personally.   EKG:

## 2024-12-20 NOTE — CONSULT NOTE ADULT - ASSESSMENT
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    CXR neg. Likely PICC as a source. Patient has a systolic murmur w/ recurrence of Stenotrophomonas bacteremia. Would recommend IE workup.    Cultures:  12/18 BCx Stenotrophomonas maltophilia (2 out of 4)  12/19 BCx GNR (1 out of 4)    Antimicrobials:  TMP/SMX 12/19-  Fluconazole ppx  Acyclovir ppx      #Stenotrophomonas bacteremia  #MDS on chemo  #Recurrent bacteremia  #Systolic murmurs    Recommendations:  - TTE. Possibly JEAN  - Continue with Bactrim 240 mg PO Q8H. Discussed with ID pharmacist re: dosing  - Add minocycline 200 mg PO Q12H  - Repeat BCx x2 sets until clears      Plan discussed with primary team house staff.  Thank you for this consult. Inpatient ID team will follow.    Bert Jordan MD, PhD  Attending Physician  Division of Infectious Diseases  Department of Medicine    Please contact through MS Teams message.  Office: [phone] (after 5 PM or weekend)   84-yo M w/ PMH of MDS on decitabine via R PICC, CAD, and recent admission (6/11-19) w/ sepsis 2/2 Stenotrophomonas maltophilia and MRSE bacteremia, presenting for outpatient lab positive with Stenotrophomonas bacteremia (12/18).    CXR neg. Likely PICC as a source. Patient has a systolic murmur w/ recurrence of Stenotrophomonas bacteremia. Would recommend IE workup. PICC line removed 12/20. Heel DTI management per podiatry, not considered a source at this time.    Cultures:  12/18 BCx Stenotrophomonas maltophilia (2 out of 4)  12/19 BCx GNR (1 out of 4)    Antimicrobials:  TMP/SMX 12/19-  Fluconazole ppx  Acyclovir ppx      #Stenotrophomonas bacteremia  #MDS on chemo  #Recurrent bacteremia  #Systolic murmurs    Recommendations:  - TTE. Possibly JEAN  - Continue with Bactrim 240 mg PO Q8H. Discussed with ID pharmacist re: dosing  - Add minocycline 200 mg PO Q12H  - Repeat BCx x2 sets until clears      Plan discussed with primary team house staff.  Thank you for this consult. Inpatient ID team will follow.    Bert Jordan MD, PhD  Attending Physician  Division of Infectious Diseases  Department of Medicine    Please contact through MS Teams message.  Office: [phone] (after 5 PM or weekend)

## 2024-12-20 NOTE — PHYSICAL THERAPY INITIAL EVALUATION ADULT - LEVEL OF INDEPENDENCE: GAIT, REHAB EVAL
max/mod of 1 and mod of another/moderate assist (50% patients effort)/maximum assist (25% patients effort)

## 2024-12-20 NOTE — PHYSICAL THERAPY INITIAL EVALUATION ADULT - PATIENT/FAMILY AGREES WITH PLAN
pt wish to think about [facility] vs return home with Home PT and assist all fxl activity and adl's , pt currently need 2 person max/mod assist to stand and take few steps at walker w poor balance

## 2024-12-20 NOTE — PHYSICAL THERAPY INITIAL EVALUATION ADULT - GAIT TRAINING, PT EVAL
GOAL: Patient will ambulate 300 feet supervision with RW in 2 weeks GOAL: Patient will ambulate 300 feet supervision with RW in 4-6 weeks

## 2024-12-20 NOTE — DISCHARGE NOTE PROVIDER - NSDCHHCONTACT_GEN_ALL_CORE_FT
As certified below, I, or a nurse practitioner or physician assistant working with me, had a face-to-face encounter that meets the physician face-to-face encounter requirements. Home

## 2024-12-20 NOTE — PROGRESS NOTE ADULT - PROBLEM SELECTOR PLAN 2
Pt reports pain the left ankle. X-ray was completed and did not demonstrate fracture.    Plan  - c/w home Tylenol for pain control  -Wound consult

## 2024-12-20 NOTE — CONSULT NOTE ADULT - SUBJECTIVE AND OBJECTIVE BOX
Patient is a 84y old  Male who presents with a chief complaint of abnormal lab finding (20 Dec 2024 07:20)      HPI:   84-year-old Male with a history of myelodysplastic syndrome on chemo (last session 12/18), CAD, CKD, HTN, HLD, who presents with positive blood cultures for stenotrophomonas maltophilia on 12/18/24 and referred to the ED for further evaluation this afternoon. Patient reports having blood drawn prior to his chemo session yesterday as well as left ankle xray 2/2 to pain. The patient reports mild left ankle pain and believes it may be sprained. Currently on Levaquin for a UTI. Denies fever, chills, nausea, vomiting, chest pain, shortness of breath, cough, abdominal pain.    He had a previous positive culture for S. maltophilia and MRSE in June 2024 where he was seen and treated by the in-house ID team and where he was given vancomycin --> zosyn --> minocyline --> bactrim. In addition, he also has had multiple infections in the past E.faeclis (3/2024, 9/2023) and E. Coli (5/2023, 9/2022, 5/2022).     In the ED, patient afebrile, hemodynamically stable, s/p 1L LR bolus, minocycline 200mg, bactrim SS 1x, DS 1x.  (19 Dec 2024 16:48)      prior hospital charts reviewed [  ]  primary team notes reviewed [  ]  other consultant notes reviewed [  ]    PAST MEDICAL & SURGICAL HISTORY:  HTN - Hypertension      Hypercholesterolemia      PC (prostate cancer)  s/p surgical resection 3 years ago      Gout      Aneurysm, ascending aorta      H/O prostatectomy      H/O carotid endarterectomy      H/O renal artery stenosis  s/p stent in Left RA      Status post cataract extraction, unspecified laterality          Allergies  No Known Allergies        ANTIMICROBIALS:  acyclovir   Oral Tab/Cap 400 two times a day  fluconAZOLE   Tablet 200 daily  trimethoprim / sulfamethoxazole IVPB 200 every 12 hours      OTHER MEDS: MEDICATIONS  (STANDING):  allopurinol 200 daily  aspirin  chewable 81 daily  clopidogrel Tablet 75 daily  gabapentin 300 three times a day  heparin   Injectable 5000 every 8 hours  influenza  Vaccine (HIGH DOSE) 0.5 once  pantoprazole    Tablet 40 before breakfast  rosuvastatin 10 at bedtime  sertraline 25 daily      SOCIAL HISTORY:   hx smoking  non-smoker    FAMILY HISTORY:      REVIEW OF SYSTEMS  [  ] ROS unobtainable because:    [  ] All other systems negative except as noted below:	    Constitutional:  [ ] fever [ ] chills  [ ] weight loss  [ ] weakness  Skin:  [ ] rash [ ] phlebitis	  Eyes: [ ] icterus [ ] pain  [ ] discharge	  ENMT: [ ] sore throat  [ ] thrush [ ] ulcers [ ] exudates  Respiratory: [ ] dyspnea [ ] hemoptysis [ ] cough [ ] sputum	  Cardiovascular:  [ ] chest pain [ ] palpitations [ ] edema	  Gastrointestinal:  [ ] nausea [ ] vomiting [ ] diarrhea [ ] constipation [ ] pain	  Genitourinary:  [ ] dysuria [ ] frequency [ ] hematuria [ ] discharge [ ] flank pain  [ ] incontinence  Musculoskeletal:  [ ] myalgias [ ] arthralgias [ ] arthritis  [ ] back pain  Neurological:  [ ] headache [ ] seizures  [ ] confusion/altered mental status  Psychiatric:  [ ] anxiety [ ] depression	  Hematology/Lymphatics:  [ ] lymphadenopathy  Endocrine:  [ ] adrenal [ ] thyroid  Allergic/Immunologic:	 [ ] transplant [ ] seasonal    Vital Signs Last 24 Hrs  T(F): 99.3 (12-20-24 @ 05:14), Max: 100.1 (12-20-24 @ 01:40)    Vital Signs Last 24 Hrs  HR: 94 (12-20-24 @ 05:14) (90 - 113)  BP: 127/71 (12-20-24 @ 05:14) (124/78 - 141/80)  RR: 18 (12-20-24 @ 05:14)  SpO2: 90% (12-20-24 @ 05:14) (90% - 100%)  Wt(kg): --    PHYSICAL EXAM:  Constitutional: non-toxic, no distress  HEAD/EYES: anicteric, no conjunctival injection  ENT:  supple, no thrush  Cardiovascular:   normal S1, S2, no murmur, no edema  Respiratory:  clear BS bilaterally, no wheezes, no rales  GI:  soft, non-tender, normal bowel sounds  :  no sandhu, no CVA tenderness  Musculoskeletal:  no synovitis, normal ROM  Neurologic: awake and alert, normal strength, no focal findings  Skin:  no rash, no erythema, no phlebitis  Heme/Onc: no lymphadenopathy   Psychiatric:  awake, alert, appropriate mood                                9.2    6.19  )-----------( 190      ( 20 Dec 2024 06:36 )             28.3       12-20    139  |  102  |  31[H]  ----------------------------<  118[H]  3.8   |  22  |  2.14[H]    Ca    8.4      20 Dec 2024 06:37  Phos  3.6     12-20  Mg     2.2     12-20    TPro  6.1  /  Alb  3.3  /  TBili  0.3  /  DBili  x   /  AST  14  /  ALT  10  /  AlkPhos  74  12-20      Urinalysis Basic - ( 20 Dec 2024 06:37 )    Color: x / Appearance: x / SG: x / pH: x  Gluc: 118 mg/dL / Ketone: x  / Bili: x / Urobili: x   Blood: x / Protein: x / Nitrite: x   Leuk Esterase: x / RBC: x / WBC x   Sq Epi: x / Non Sq Epi: x / Bacteria: x        MICROBIOLOGY:        Culture - Blood (collected 12-18-24 @ 09:12)  Source: .Blood BLOOD  Gram Stain (12-19-24 @ 04:02):    Growth in aerobic bottle: Gram Negative Rods  Preliminary Report (12-19-24 @ 19:22):    Growth in aerobic bottle: Stenotrophomonas maltophilia    Direct identification is available within approximately 3-5    hours either by Blood Panel Multiplexed PCR or Direct    MALDI-TOF. Details: [email or web address]  Organism: Blood Culture PCR (12-19-24 @ 04:49)  Organism: Blood Culture PCR (12-19-24 @ 04:49)      Method Type: PCR      -  Stenotrophomonas maltophilia: Detec    Culture - Blood (collected 12-18-24 @ 09:11)  Source: .Blood BLOOD  Gram Stain (12-19-24 @ 12:12):    Growth in aerobic bottle: Gram Negative Rods  Preliminary Report (12-19-24 @ 12:12):    Growth in aerobic bottle: Gram Negative Rods                    RADIOLOGY:  imaging below personally reviewed  < from: Xray Chest 2 Views PA/Lat (12.19.24 @ 11:19) >    ACC: [id number] EXAM:  XR CHEST PA LAT 2V   ORDERED BY: ADE CM     PROCEDURE DATE:  12/19/2024          INTERPRETATION:  EXAMINATION: XR CHEST PA AND LATERAL    CLINICAL INDICATION: Sepsis    TECHNIQUE: 2 views; Frontal and lateral views of the chest were obtained.    COMPARISON: Chest x-ray 6/17/2024.    FINDINGS:  Right upper extremity PICC terminates in the SVC. Surgical clips in the   neck are noted.  The heart is normal in size.  The lungs are clear.  There is no pneumothorax or pleural effusion.  No acute osseous abnormalities. Mild bilateral glenohumeral joint   arthrosis. Degenerative changes of the thoracic spine.    IMPRESSION:  Clear lungs.    --- End of Report ---          BELKIS ANGELES DO; Resident Radiologist  This document has been electronically signed.  NAHID KOVACS MD; Attending Radiologist  This document has been electronically signed. Dec 19 2024 11:29AM    < end of copied text >   Patient is a 84y old  Male who presents with a chief complaint of abnormal lab finding (20 Dec 2024 07:20)      HPI:   84-year-old Male with a history of myelodysplastic syndrome on chemo (last session 12/18), CAD, CKD, HTN, HLD, who presents with positive blood cultures for stenotrophomonas maltophilia on 12/18/24 and referred to the ED for further evaluation this afternoon. Patient reports having blood drawn prior to his chemo session yesterday as well as left ankle xray 2/2 to pain. The patient reports mild left ankle pain and believes it may be sprained. Currently on Levaquin for a UTI. Denies fever, chills, nausea, vomiting, chest pain, shortness of breath, cough, abdominal pain.    He had a previous positive culture for S. maltophilia and MRSE in June 2024 where he was seen and treated by the in-house ID team and where he was given vancomycin --> zosyn --> minocyline --> bactrim. In addition, he also has had multiple infections in the past E.faeclis (3/2024, 9/2023) and E. Coli (5/2023, 9/2022, 5/2022).     In the ED, patient afebrile, hemodynamically stable, s/p 1L LR bolus, minocycline 200mg, bactrim SS 1x, DS 1x.  (19 Dec 2024 16:48)    Above noted.  Tmax 100.1, WBC 6.08, SCr 2.06. BCx from 12/19 w/ GNR. UA w/ 88 WBC, many john, and +LE. CXR w/ clear lungs.       prior hospital charts reviewed [X]  primary team notes reviewed [X]  other consultant notes reviewed [X]    PAST MEDICAL & SURGICAL HISTORY:  HTN - Hypertension      Hypercholesterolemia      PC (prostate cancer)  s/p surgical resection 3 years ago      Gout      Aneurysm, ascending aorta      H/O prostatectomy      H/O carotid endarterectomy      H/O renal artery stenosis  s/p stent in Left RA      Status post cataract extraction, unspecified laterality          Allergies  No Known Allergies        ANTIMICROBIALS:  acyclovir   Oral Tab/Cap 400 two times a day  fluconAZOLE   Tablet 200 daily  trimethoprim / sulfamethoxazole IVPB 200 every 12 hours      OTHER MEDS: MEDICATIONS  (STANDING):  allopurinol 200 daily  aspirin  chewable 81 daily  clopidogrel Tablet 75 daily  gabapentin 300 three times a day  heparin   Injectable 5000 every 8 hours  influenza  Vaccine (HIGH DOSE) 0.5 once  pantoprazole    Tablet 40 before breakfast  rosuvastatin 10 at bedtime  sertraline 25 daily      SOCIAL HISTORY:  No tobacco or illicit drug use    FAMILY HISTORY:  No known pertinent family history in first degree relatives: endocarditis        REVIEW OF SYSTEMS  [  ] ROS unobtainable because:    [X] All other systems negative except as noted below:	    Constitutional:  [ ] fever [ ] chills  [ ] weight loss  [ ] weakness  Skin:  [ ] rash [ ] phlebitis	  Eyes: [ ] icterus [ ] pain  [ ] discharge	  ENMT: [ ] sore throat  [ ] thrush [ ] ulcers [ ] exudates  Respiratory: [ ] dyspnea [ ] hemoptysis [ ] cough [ ] sputum	  Cardiovascular:  [ ] chest pain [ ] palpitations [ ] edema	  Gastrointestinal:  [ ] nausea [ ] vomiting [ ] diarrhea [ ] constipation [ ] pain	  Genitourinary:  [ ] dysuria [ ] frequency [ ] hematuria [ ] discharge [ ] flank pain  [ ] incontinence  Musculoskeletal:  [ ] myalgias [ ] arthralgias [ ] arthritis  [ ] back pain  Neurological:  [ ] headache [ ] seizures  [ ] confusion/altered mental status  Psychiatric:  [ ] anxiety [ ] depression	  Hematology/Lymphatics:  [ ] lymphadenopathy  Endocrine:  [ ] adrenal [ ] thyroid  Allergic/Immunologic:	 [ ] transplant [ ] seasonal    Vital Signs Last 24 Hrs  T(F): 99.3 (12-20-24 @ 05:14), Max: 100.1 (12-20-24 @ 01:40)    Vital Signs Last 24 Hrs  HR: 94 (12-20-24 @ 05:14) (90 - 113)  BP: 127/71 (12-20-24 @ 05:14) (124/78 - 141/80)  RR: 18 (12-20-24 @ 05:14)  SpO2: 90% (12-20-24 @ 05:14) (90% - 100%)  Wt(kg): --    PHYSICAL EXAM:  Constitutional: non-toxic, no distress  HEAD/EYES: anicteric, no conjunctival injection  ENT:  supple  Cardiovascular:   normal S1, S2, RRR; +systolic murmur  Respiratory:  clear BS bilaterally, no wheezes, no rales  GI:  soft, non-tender  :  no sandhu, no CVA tenderness  Musculoskeletal:  no BUE/BLE edema  Neurologic: awake and alert  Skin:  RUE PICC s/p removal, no tenderness or purulence  Heme/Onc: no lymphadenopathy   Psychiatric:  awake, alert, appropriate mood                                9.2    6.19  )-----------( 190      ( 20 Dec 2024 06:36 )             28.3       12-20    139  |  102  |  31[H]  ----------------------------<  118[H]  3.8   |  22  |  2.14[H]    Ca    8.4      20 Dec 2024 06:37  Phos  3.6     12-20  Mg     2.2     12-20    TPro  6.1  /  Alb  3.3  /  TBili  0.3  /  DBili  x   /  AST  14  /  ALT  10  /  AlkPhos  74  12-20      Urinalysis Basic - ( 20 Dec 2024 06:37 )    Color: x / Appearance: x / SG: x / pH: x  Gluc: 118 mg/dL / Ketone: x  / Bili: x / Urobili: x   Blood: x / Protein: x / Nitrite: x   Leuk Esterase: x / RBC: x / WBC x   Sq Epi: x / Non Sq Epi: x / Bacteria: x        MICROBIOLOGY:        Culture - Blood (collected 12-18-24 @ 09:12)  Source: .Blood BLOOD  Gram Stain (12-19-24 @ 04:02):    Growth in aerobic bottle: Gram Negative Rods  Preliminary Report (12-19-24 @ 19:22):    Growth in aerobic bottle: Stenotrophomonas maltophilia    Direct identification is available within approximately 3-5    hours either by Blood Panel Multiplexed PCR or Direct    MALDI-TOF. Details: [email or web address]  Organism: Blood Culture PCR (12-19-24 @ 04:49)  Organism: Blood Culture PCR (12-19-24 @ 04:49)      Method Type: PCR      -  Stenotrophomonas maltophilia: Detec    Culture - Blood (collected 12-18-24 @ 09:11)  Source: .Blood BLOOD  Gram Stain (12-19-24 @ 12:12):    Growth in aerobic bottle: Gram Negative Rods  Preliminary Report (12-19-24 @ 12:12):    Growth in aerobic bottle: Gram Negative Rods                    RADIOLOGY:  imaging below personally reviewed  < from: Xray Chest 2 Views PA/Lat (12.19.24 @ 11:19) >    ACC: [id number] EXAM:  XR CHEST PA LAT 2V   ORDERED BY: ADE CM     PROCEDURE DATE:  12/19/2024          INTERPRETATION:  EXAMINATION: XR CHEST PA AND LATERAL    CLINICAL INDICATION: Sepsis    TECHNIQUE: 2 views; Frontal and lateral views of the chest were obtained.    COMPARISON: Chest x-ray 6/17/2024.    FINDINGS:  Right upper extremity PICC terminates in the SVC. Surgical clips in the   neck are noted.  The heart is normal in size.  The lungs are clear.  There is no pneumothorax or pleural effusion.  No acute osseous abnormalities. Mild bilateral glenohumeral joint   arthrosis. Degenerative changes of the thoracic spine.    IMPRESSION:  Clear lungs.    --- End of Report ---          BELKIS ANGELES DO; Resident Radiologist  This document has been electronically signed.  NAHID KOVACS MD; Attending Radiologist  This document has been electronically signed. Dec 19 2024 11:29AM    < end of copied text >   Patient is a 84y old  Male who presents with a chief complaint of abnormal lab finding (20 Dec 2024 07:20)      HPI:   84-year-old Male with a history of myelodysplastic syndrome on chemo (last session 12/18), CAD, CKD, HTN, HLD, who presents with positive blood cultures for stenotrophomonas maltophilia on 12/18/24 and referred to the ED for further evaluation this afternoon. Patient reports having blood drawn prior to his chemo session yesterday as well as left ankle xray 2/2 to pain. The patient reports mild left ankle pain and believes it may be sprained. Currently on Levaquin for a UTI. Denies fever, chills, nausea, vomiting, chest pain, shortness of breath, cough, abdominal pain.    He had a previous positive culture for S. maltophilia and MRSE in June 2024 where he was seen and treated by the in-house ID team and where he was given vancomycin --> zosyn --> minocyline --> bactrim. In addition, he also has had multiple infections in the past E.faeclis (3/2024, 9/2023) and E. Coli (5/2023, 9/2022, 5/2022).     In the ED, patient afebrile, hemodynamically stable, s/p 1L LR bolus, minocycline 200mg, bactrim SS 1x, DS 1x.  (19 Dec 2024 16:48)    Above noted.  Tmax 100.1, WBC 6.08, SCr 2.06. BCx from 12/19 w/ GNR. UA w/ 88 WBC, many john, and +LE. CXR w/ clear lungs.       prior hospital charts reviewed [X]  primary team notes reviewed [X]  other consultant notes reviewed [X]    PAST MEDICAL & SURGICAL HISTORY:  HTN - Hypertension      Hypercholesterolemia      PC (prostate cancer)  s/p surgical resection 3 years ago      Gout      Aneurysm, ascending aorta      H/O prostatectomy      H/O carotid endarterectomy      H/O renal artery stenosis  s/p stent in Left RA      Status post cataract extraction, unspecified laterality          Allergies  No Known Allergies        ANTIMICROBIALS:  acyclovir   Oral Tab/Cap 400 two times a day  fluconAZOLE   Tablet 200 daily  trimethoprim / sulfamethoxazole IVPB 200 every 12 hours      OTHER MEDS: MEDICATIONS  (STANDING):  allopurinol 200 daily  aspirin  chewable 81 daily  clopidogrel Tablet 75 daily  gabapentin 300 three times a day  heparin   Injectable 5000 every 8 hours  influenza  Vaccine (HIGH DOSE) 0.5 once  pantoprazole    Tablet 40 before breakfast  rosuvastatin 10 at bedtime  sertraline 25 daily      SOCIAL HISTORY:  No tobacco or illicit drug use    FAMILY HISTORY:  No known pertinent family history in first degree relatives: endocarditis        REVIEW OF SYSTEMS  [  ] ROS unobtainable because:    [X] All other systems negative except as noted below:	    Constitutional:  [ ] fever [ ] chills  [ ] weight loss  [ ] weakness  Skin:  [ ] rash [ ] phlebitis	  Eyes: [ ] icterus [ ] pain  [ ] discharge	  ENMT: [ ] sore throat  [ ] thrush [ ] ulcers [ ] exudates  Respiratory: [ ] dyspnea [ ] hemoptysis [ ] cough [ ] sputum	  Cardiovascular:  [ ] chest pain [ ] palpitations [ ] edema	  Gastrointestinal:  [ ] nausea [ ] vomiting [ ] diarrhea [ ] constipation [ ] pain	  Genitourinary:  [ ] dysuria [ ] frequency [ ] hematuria [ ] discharge [ ] flank pain  [ ] incontinence  Musculoskeletal:  [ ] myalgias [ ] arthralgias [ ] arthritis  [ ] back pain  Neurological:  [ ] headache [ ] seizures  [ ] confusion/altered mental status  Psychiatric:  [ ] anxiety [ ] depression	  Hematology/Lymphatics:  [ ] lymphadenopathy  Endocrine:  [ ] adrenal [ ] thyroid  Allergic/Immunologic:	 [ ] transplant [ ] seasonal    Vital Signs Last 24 Hrs  T(F): 99.3 (12-20-24 @ 05:14), Max: 100.1 (12-20-24 @ 01:40)    Vital Signs Last 24 Hrs  HR: 94 (12-20-24 @ 05:14) (90 - 113)  BP: 127/71 (12-20-24 @ 05:14) (124/78 - 141/80)  RR: 18 (12-20-24 @ 05:14)  SpO2: 90% (12-20-24 @ 05:14) (90% - 100%)  Wt(kg): --    PHYSICAL EXAM:  Constitutional: non-toxic, no distress  HEAD/EYES: anicteric, no conjunctival injection  ENT:  supple  Cardiovascular:   normal S1, S2, RRR; +systolic murmur  Respiratory:  clear BS bilaterally, no wheezes, no rales  GI:  soft, non-tender  :  no sandhu, no CVA tenderness  Musculoskeletal:  no BUE/BLE edema; L heel DTI w/o purulence  Neurologic: awake and alert  Skin:  RUE PICC s/p removal, no tenderness or purulence  Heme/Onc: no lymphadenopathy   Psychiatric:  awake, alert, appropriate mood                                9.2    6.19  )-----------( 190      ( 20 Dec 2024 06:36 )             28.3       12-20    139  |  102  |  31[H]  ----------------------------<  118[H]  3.8   |  22  |  2.14[H]    Ca    8.4      20 Dec 2024 06:37  Phos  3.6     12-20  Mg     2.2     12-20    TPro  6.1  /  Alb  3.3  /  TBili  0.3  /  DBili  x   /  AST  14  /  ALT  10  /  AlkPhos  74  12-20      Urinalysis Basic - ( 20 Dec 2024 06:37 )    Color: x / Appearance: x / SG: x / pH: x  Gluc: 118 mg/dL / Ketone: x  / Bili: x / Urobili: x   Blood: x / Protein: x / Nitrite: x   Leuk Esterase: x / RBC: x / WBC x   Sq Epi: x / Non Sq Epi: x / Bacteria: x        MICROBIOLOGY:        Culture - Blood (collected 12-18-24 @ 09:12)  Source: .Blood BLOOD  Gram Stain (12-19-24 @ 04:02):    Growth in aerobic bottle: Gram Negative Rods  Preliminary Report (12-19-24 @ 19:22):    Growth in aerobic bottle: Stenotrophomonas maltophilia    Direct identification is available within approximately 3-5    hours either by Blood Panel Multiplexed PCR or Direct    MALDI-TOF. Details: [email or web address]  Organism: Blood Culture PCR (12-19-24 @ 04:49)  Organism: Blood Culture PCR (12-19-24 @ 04:49)      Method Type: PCR      -  Stenotrophomonas maltophilia: Detec    Culture - Blood (collected 12-18-24 @ 09:11)  Source: .Blood BLOOD  Gram Stain (12-19-24 @ 12:12):    Growth in aerobic bottle: Gram Negative Rods  Preliminary Report (12-19-24 @ 12:12):    Growth in aerobic bottle: Gram Negative Rods                    RADIOLOGY:  imaging below personally reviewed  < from: Xray Chest 2 Views PA/Lat (12.19.24 @ 11:19) >    ACC: [id number] EXAM:  XR CHEST PA LAT 2V   ORDERED BY: ADE CM     PROCEDURE DATE:  12/19/2024          INTERPRETATION:  EXAMINATION: XR CHEST PA AND LATERAL    CLINICAL INDICATION: Sepsis    TECHNIQUE: 2 views; Frontal and lateral views of the chest were obtained.    COMPARISON: Chest x-ray 6/17/2024.    FINDINGS:  Right upper extremity PICC terminates in the SVC. Surgical clips in the   neck are noted.  The heart is normal in size.  The lungs are clear.  There is no pneumothorax or pleural effusion.  No acute osseous abnormalities. Mild bilateral glenohumeral joint   arthrosis. Degenerative changes of the thoracic spine.    IMPRESSION:  Clear lungs.    --- End of Report ---          BELKIS ANGELES DO; Resident Radiologist  This document has been electronically signed.  NAHID KOVACS MD; Attending Radiologist  This document has been electronically signed. Dec 19 2024 11:29AM    < end of copied text >

## 2024-12-20 NOTE — DISCHARGE NOTE PROVIDER - NSDCMRMEDTOKEN_GEN_ALL_CORE_FT
acyclovir 400 mg oral tablet: 1 tab(s) orally 2 times a day  allopurinol 100 mg oral tablet: 1 tab(s) orally once a day   allopurinol 100 mg oral tablet: 2 tab(s) orally once a day  amLODIPine 5 mg oral tablet: 1 tab(s) orally once a day  aspirin 81 mg oral capsule: 1 cap(s) orally once a day  Bactrim  mg-160 mg oral tablet: 1 tab(s) orally every 12 hours Last day includes 6.27.  cholecalciferol oral tablet: 2000 unit(s) orally once a day  cyclobenzaprine 5 mg oral tablet: 1 tab(s) orally 3 times a day as needed for  back pain  fluconazole 200 mg oral tablet: 1 tab(s) orally once a day  furosemide 40 mg oral tablet: 1 tab(s) orally once a day  gabapentin 100 mg oral capsule: 3 cap(s) orally 3 times a day  LEVOFLOXACIN 500MG TAB:   metoprolol succinate 50 mg oral tablet, extended release: 3 tab(s) orally once a day  omeprazole 20 mg oral delayed release tablet: 1 tab(s) orally once a day  Plavix 75 mg oral tablet: 1 tab(s) orally once a day  rosuvastatin 10 mg oral tablet: 1 tab(s) orally once a day  [facility]-electric hospital bed: Dx HF, PATRIZIA 99  sertraline 25 mg oral tablet: 1 tab(s) orally once a day (at bedtime)   acyclovir 400 mg oral tablet: 1 tab(s) orally 2 times a day  allopurinol 100 mg oral tablet: 1 tab(s) orally once a day   allopurinol 100 mg oral tablet: 2 tab(s) orally once a day  amLODIPine 5 mg oral tablet: 1 tab(s) orally once a day  aspirin 81 mg oral capsule: 1 cap(s) orally once a day  Bactrim  mg-160 mg oral tablet: 1 tab(s) orally every 12 hours Last day includes 6.27.  cholecalciferol oral tablet: 2000 unit(s) orally once a day  cyclobenzaprine 5 mg oral tablet: 1 tab(s) orally 3 times a day as needed for  back pain  fluconazole 200 mg oral tablet: 1 tab(s) orally once a day  furosemide 40 mg oral tablet: 1 tab(s) orally once a day  gabapentin 300 mg oral capsule: 1 cap(s) orally 2 times a day  LEVOFLOXACIN 500MG TAB:   metoprolol succinate 50 mg oral tablet, extended release: 3 tab(s) orally once a day  omeprazole 20 mg oral delayed release tablet: 1 tab(s) orally once a day  Plavix 75 mg oral tablet: 1 tab(s) orally once a day  rosuvastatin 10 mg oral tablet: 1 tab(s) orally once a day  [facility]-electric hospital bed: Dx HF, PATRIZIA 99  sertraline 25 mg oral tablet: 1 tab(s) orally once a day   acyclovir 400 mg oral tablet: 1 tab(s) orally 2 times a day  allopurinol 100 mg oral tablet: 1 tab(s) orally once a day   allopurinol 100 mg oral tablet: 2 tab(s) orally once a day  amLODIPine 5 mg oral tablet: 1 tab(s) orally once a day  aspirin 81 mg oral capsule: 1 cap(s) orally once a day  Bactrim  mg-160 mg oral tablet: 1 tab(s) orally 3 times a day Last dose 1/3/25  cholecalciferol oral tablet: 2000 unit(s) orally once a day  cyclobenzaprine 5 mg oral tablet: 1 tab(s) orally 3 times a day as needed for  back pain  fluconazole 200 mg oral tablet: 1 tab(s) orally once a day  furosemide 40 mg oral tablet: 1 tab(s) orally once a day  gabapentin 300 mg oral capsule: 1 cap(s) orally 2 times a day  metoprolol succinate 50 mg oral tablet, extended release: 3 tab(s) orally once a day  omeprazole 20 mg oral delayed release tablet: 1 tab(s) orally once a day  Plavix 75 mg oral tablet: 1 tab(s) orally once a day  rosuvastatin 10 mg oral tablet: 1 tab(s) orally once a day  [facility]electric hospital bed: Dx HF, PATRIZIA 99  sertraline 25 mg oral tablet: 1 tab(s) orally once a day  sevelamer carbonate 800 mg oral tablet: 1 tab(s) orally once a day  sodium bicarbonate 650 mg oral tablet: 1 tab(s) orally 3 times a day   acyclovir 400 mg oral tablet: 1 tab(s) orally 2 times a day  allopurinol 100 mg oral tablet: 1 tab(s) orally once a day   allopurinol 100 mg oral tablet: 2 tab(s) orally once a day  amLODIPine 5 mg oral tablet: 1 tab(s) orally once a day  aspirin 81 mg oral capsule: 1 cap(s) orally once a day  Bactrim  mg-160 mg oral tablet: 1 tab(s) orally 3 times a day Last dose 1/3/25  cholecalciferol oral tablet: 2000 unit(s) orally once a day  cyclobenzaprine 5 mg oral tablet: 1 tab(s) orally 3 times a day as needed for  back pain  fluconazole 200 mg oral tablet: 1 tab(s) orally once a day  furosemide 40 mg oral tablet: 1 tab(s) orally once a day  gabapentin 300 mg oral capsule: 1 cap(s) orally 2 times a day  metoprolol succinate 50 mg oral tablet, extended release: 3 tab(s) orally once a day  omeprazole 20 mg oral delayed release tablet: 1 tab(s) orally once a day  Plavix 75 mg oral tablet: 1 tab(s) orally once a day  rosuvastatin 10 mg oral tablet: 1 tab(s) orally once a day  Semi-electric hospital bed: Dx HF, PATRIZIA 99  sertraline 25 mg oral tablet: 1 tab(s) orally once a day  sevelamer carbonate 800 mg oral tablet: 1 tab(s) orally once a day  sevelamer carbonate 800 mg oral tablet: 1 tab(s) orally once a day  sodium bicarbonate 650 mg oral tablet: 1 tab(s) orally 3 times a day   acyclovir 400 mg oral tablet: 1 tab(s) orally 2 times a day  allopurinol 100 mg oral tablet: 1 tab(s) orally once a day   allopurinol 100 mg oral tablet: 2 tab(s) orally once a day  amLODIPine 5 mg oral tablet: 1 tab(s) orally once a day  aspirin 81 mg oral capsule: 1 cap(s) orally once a day  Bactrim  mg-160 mg oral tablet: 1 tab(s) orally 3 times a day Last dose 1/3/25  carvedilol 3.125 mg oral tablet: 1 tab(s) orally every 12 hours  cholecalciferol oral tablet: 2000 unit(s) orally once a day  cyclobenzaprine 5 mg oral tablet: 1 tab(s) orally 3 times a day as needed for  back pain  fluconazole 200 mg oral tablet: 1 tab(s) orally once a day  furosemide 40 mg oral tablet: 1 tab(s) orally once a day  gabapentin 300 mg oral capsule: 1 cap(s) orally 2 times a day  metoprolol succinate 50 mg oral tablet, extended release: 3 tab(s) orally once a day  omeprazole 20 mg oral delayed release tablet: 1 tab(s) orally once a day  Plavix 75 mg oral tablet: 1 tab(s) orally once a day  rosuvastatin 10 mg oral tablet: 1 tab(s) orally once a day  Semi-electric hospital bed: Dx HF, PATRIZIA 99  sertraline 25 mg oral tablet: 1 tab(s) orally once a day  sevelamer carbonate 800 mg oral tablet: 1 tab(s) orally once a day  sodium bicarbonate 650 mg oral tablet: 1 tab(s) orally 3 times a day   acyclovir 400 mg oral tablet: 1 tab(s) orally 2 times a day  allopurinol 100 mg oral tablet: 1 tab(s) orally once a day   allopurinol 100 mg oral tablet: 2 tab(s) orally once a day  amLODIPine 5 mg oral tablet: 1 tab(s) orally once a day  aspirin 81 mg oral capsule: 1 cap(s) orally once a day  Bactrim  mg-160 mg oral tablet: 1 tab(s) orally 3 times a day Last dose 1/3/25  carvedilol 3.125 mg oral tablet: 1 tab(s) orally every 12 hours  cholecalciferol oral tablet: 2000 unit(s) orally once a day  cyclobenzaprine 5 mg oral tablet: 1 tab(s) orally 3 times a day as needed for  back pain  fluconazole 200 mg oral tablet: 1 tab(s) orally once a day  furosemide 40 mg oral tablet: 1 tab(s) orally once a day  gabapentin 300 mg oral capsule: 1 cap(s) orally 2 times a day  Home Physical Therapy 2x a week for 4 weeks for muscle weakness (ICD 10: M62.81): Home Physical Therapy 2x a week for 4 weeks for muscle weakness (ICD 10: M62.81)  metoprolol succinate 50 mg oral tablet, extended release: 3 tab(s) orally once a day  omeprazole 20 mg oral delayed release tablet: 1 tab(s) orally once a day  Patient Lift Device for Muscle Weakness (ICD 10: M62.81): Patient Lift Device for Muscle Weakness (ICD 10: M62.81)  PICC line care: Resumption of PICC line care, can resume care through [facility]  Flush 1x per week each lumen with 10mL NS 0.9%  Plavix 75 mg oral tablet: 1 tab(s) orally once a day  rosuvastatin 10 mg oral tablet: 1 tab(s) orally once a day  [facility]-electric hospital bed: Dx HF, PATRIZIA 99  sertraline 25 mg oral tablet: 1 tab(s) orally once a day  sevelamer carbonate 800 mg oral tablet: 1 tab(s) orally once a day  sodium bicarbonate 650 mg oral tablet: 1 tab(s) orally 3 times a day   acyclovir 400 mg oral tablet: 1 tab(s) orally 2 times a day  allopurinol 100 mg oral tablet: 2 tab(s) orally once a day  amLODIPine 5 mg oral tablet: 1 tab(s) orally once a day  aspirin 81 mg oral capsule: 1 cap(s) orally once a day  Bactrim  mg-160 mg oral tablet: 1 tab(s) orally 3 times a day Last dose 1/3/25  cholecalciferol oral tablet: 2000 unit(s) orally once a day  cyclobenzaprine 5 mg oral tablet: 1 tab(s) orally 3 times a day as needed for  back pain  fluconazole 200 mg oral tablet: 1 tab(s) orally once a day  furosemide 40 mg oral tablet: 1 tab(s) orally once a day  gabapentin 300 mg oral capsule: 1 cap(s) orally 2 times a day  Home Physical Therapy 2x a week for 4 weeks for muscle weakness (ICD 10: M62.81): Home Physical Therapy 2x a week for 4 weeks for muscle weakness (ICD 10: M62.81)  metoprolol succinate 50 mg oral tablet, extended release: 3 tab(s) orally once a day  omeprazole 20 mg oral delayed release tablet: 1 tab(s) orally once a day  Patient Lift Device for Muscle Weakness (ICD 10: M62.81): Patient Lift Device for Muscle Weakness (ICD 10: M62.81)  PICC line care: Resumption of PICC line care, can resume care through [facility]  Flush 1x per week each lumen with 10mL NS 0.9%  Plavix 75 mg oral tablet: 1 tab(s) orally once a day  rosuvastatin 10 mg oral tablet: 1 tab(s) orally once a day  Semi-electric hospital bed: Dx HF, PATRIZIA 99  sertraline 25 mg oral tablet: 1 tab(s) orally once a day  sevelamer carbonate 800 mg oral tablet: 1 tab(s) orally once a day  sodium bicarbonate 650 mg oral tablet: 1 tab(s) orally 3 times a day   acyclovir 400 mg oral tablet: 1 tab(s) orally 2 times a day  allopurinol 100 mg oral tablet: 2 tab(s) orally once a day  amLODIPine 5 mg oral tablet: 1 tab(s) orally once a day  aspirin 81 mg oral capsule: 1 cap(s) orally once a day  Bactrim  mg-160 mg oral tablet: 1 tab(s) orally 3 times a day Last dose 1/3/25  cholecalciferol oral tablet: 2000 unit(s) orally once a day  cyclobenzaprine 5 mg oral tablet: 1 tab(s) orally 3 times a day as needed for  back pain  fluconazole 200 mg oral tablet: 1 tab(s) orally once a day  furosemide 40 mg oral tablet: 1 tab(s) orally once a day  gabapentin 300 mg oral capsule: 1 cap(s) orally 2 times a day  Home Physical Therapy 2x a week for 4 weeks for muscle weakness (ICD 10: M62.81): Home Physical Therapy 2x a week for 4 weeks for muscle weakness (ICD 10: M62.81)  metoprolol succinate 50 mg oral tablet, extended release: 3 tab(s) orally once a day  omeprazole 20 mg oral delayed release tablet: 1 tab(s) orally once a day  Patient Lift Device for Muscle Weakness (ICD 10: M62.81): Patient Lift Device for Muscle Weakness (ICD 10: M62.81)  PICC line care: PICC line care, can resume PICC line care through [facility]. Flush 1x per week each lumen with 10mL NS 0.9%, with weekly dressing changes for a duration of 1 year  Plavix 75 mg oral tablet: 1 tab(s) orally once a day  rosuvastatin 10 mg oral tablet: 1 tab(s) orally once a day  Semi-electric hospital bed: Dx HF, PATRIZIA 99  sertraline 25 mg oral tablet: 1 tab(s) orally once a day  sevelamer carbonate 800 mg oral tablet: 1 tab(s) orally once a day  sodium bicarbonate 650 mg oral tablet: 1 tab(s) orally 3 times a day

## 2024-12-20 NOTE — PROGRESS NOTE ADULT - PROBLEM SELECTOR PLAN 4
- hold dacogen while in hospital  - Cw antifungal and antiviral prophylaxis (pt on fluconazole 200mg daily and acyclovir 400mg BID as outpatient); pt also normally on levofloxacin 500mg daily as outpatient- can hold while on broad spectrum antibiotics   - pt to f/u with outpatient hematologist Dr. Mcmahon after discharge- notify Heme/onc prior to discharge  -appreciate heme onc recs

## 2024-12-20 NOTE — PHYSICAL THERAPY INITIAL EVALUATION ADULT - GENERAL OBSERVATIONS, REHAB EVAL
pt received in bed on 12/23/24 all siderails up HOB 35 degrees call bell,phone and table in reach bed alarm active bed in lowest position  on 12/23/24 ; pt

## 2024-12-20 NOTE — PHYSICAL THERAPY INITIAL EVALUATION ADULT - TRANSFER TRAINING, PT EVAL
Goal: Patient will perform transfers sit to stand supervision 2 weeks Goal: Patient will perform transfers sit to stand supervision 4-6 weeks

## 2024-12-20 NOTE — PHYSICAL THERAPY INITIAL EVALUATION ADULT - GAIT DEVIATIONS NOTED, PT EVAL
decreased yaniv/increased time in double stance/decreased step length/decreased stride length/decreased swing-to-stance ratio/decreased weight-shifting ability

## 2024-12-20 NOTE — DISCHARGE NOTE PROVIDER - NSDCCPCAREPLAN_GEN_ALL_CORE_FT
PRINCIPAL DISCHARGE DIAGNOSIS  Diagnosis: Bacteremia  Assessment and Plan of Treatment: You came to the hospital because you were found to have bacteria in your blood during an outpatient test, called S. maltophilia. Our infectious disease specialists saw you and recommended that we treat you with two antibiotics called bactrim and minocycline.......      SECONDARY DISCHARGE DIAGNOSES  Diagnosis: UTI (urinary tract infection)  Assessment and Plan of Treatment:      PRINCIPAL DISCHARGE DIAGNOSIS  Diagnosis: Bacteremia  Assessment and Plan of Treatment: You came to the hospital because you were found to have bacteria in your blood during an outpatient test, called S. maltophilia. Our infectious disease specialists saw you and recommended that we treat you with two antibiotics called bactrim and minocycline. An ultrasound scan of the heart (TTE and JEAN) did not show any infection in your heart. Please continue the antibiotics until __________. If you experience fevers, chills, nausea, vomiting, abdominal pain, or feel generally ill then please return to the hospital for further evaluation. Please see your primary care doctor within 1 week of discharge.      SECONDARY DISCHARGE DIAGNOSES  Diagnosis: UTI (urinary tract infection)  Assessment and Plan of Treatment: You were found to have a UTI for which you were treated with using antibiotics. If you experience fevers, chills, nausea, difficult urinating, pain with urination, or feel generally ill then please return to the hospital for further evaluation. Please see your primary care doctor within 1 week of discharge.     PRINCIPAL DISCHARGE DIAGNOSIS  Diagnosis: Bacteremia  Assessment and Plan of Treatment: You came to the hospital because you were found to have bacteria in your blood during an outpatient test, called S. maltophilia. Our infectious disease specialists saw you and recommended that we treat you with two antibiotics called bactrim and minocycline. An ultrasound scan of the heart (TTE and JEAN) did not show any infection in your heart. Please continue the Bactrim double-strength until 01/03/2025. If you experience fevers, chills, nausea, vomiting, abdominal pain, or feel generally ill then please return to the hospital for further evaluation. Please see your primary care doctor within 1 week of discharge. Please see your hematologist, Dr. Mcmahon, within 1 week of discharge.      SECONDARY DISCHARGE DIAGNOSES  Diagnosis: UTI (urinary tract infection)  Assessment and Plan of Treatment: You were found to have a UTI for which you were treated with using antibiotics. If you experience fevers, chills, nausea, difficult urinating, pain with urination, or feel generally ill then please return to the hospital for further evaluation. Please see your primary care doctor within 1 week of discharge.

## 2024-12-20 NOTE — PHYSICAL THERAPY INITIAL EVALUATION ADULT - LEVEL OF INDEPENDENCE: SIT/SUPINE, REHAB EVAL
moderate assist (50% patients effort) mod of 2 into bed on 12/23/24/moderate assist (50% patients effort)

## 2024-12-20 NOTE — CONSULT NOTE ADULT - SUBJECTIVE AND OBJECTIVE BOX
Patient is a 84y old  Male who presents with a chief complaint of abnormal lab finding (20 Dec 2024 12:17)      HPI:   84-year-old Male with a history of myelodysplastic syndrome on chemo (last session 12/18), CAD, CKD, HTN, HLD, who presents with positive blood cultures for stenotrophomonas maltophilia on 12/18/24 and referred to the ED for further evaluation this afternoon. Patient reports having blood drawn prior to his chemo session yesterday as well as left ankle xray 2/2 to pain. The patient reports mild left ankle pain and believes it may be sprained. Currently on Levaquin for a UTI. Denies fever, chills, nausea, vomiting, chest pain, shortness of breath, cough, abdominal pain.    He had a previous positive culture for S. maltophilia and MRSE in June 2024 where he was seen and treated by the in-house ID team and where he was given vancomycin --> zosyn --> minocyline --> bactrim. In addition, he also has had multiple infections in the past E.faeclis (3/2024, 9/2023) and E. Coli (5/2023, 9/2022, 5/2022).     In the ED, patient afebrile, hemodynamically stable, s/p 1L LR bolus, minocycline 200mg, bactrim SS 1x, DS 1x.  (19 Dec 2024 16:48)      PAST MEDICAL & SURGICAL HISTORY:  HTN - Hypertension      Hypercholesterolemia      PC (prostate cancer)  s/p surgical resection 3 years ago      Gout      Aneurysm, ascending aorta      H/O prostatectomy      H/O carotid endarterectomy      H/O renal artery stenosis  s/p stent in Left RA      Status post cataract extraction, unspecified laterality          MEDICATIONS  (STANDING):  acyclovir   Oral Tab/Cap 400 milliGRAM(s) Oral two times a day  allopurinol 200 milliGRAM(s) Oral daily  aspirin  chewable 81 milliGRAM(s) Oral daily  chlorhexidine 4% Liquid 1 Application(s) Topical daily  clopidogrel Tablet 75 milliGRAM(s) Oral daily  fluconAZOLE   Tablet 200 milliGRAM(s) Oral daily  gabapentin 300 milliGRAM(s) Oral three times a day  heparin   Injectable 5000 Unit(s) SubCutaneous every 8 hours  influenza  Vaccine (HIGH DOSE) 0.5 milliLiter(s) IntraMuscular once  minocycline 200 milliGRAM(s) Oral two times a day  pantoprazole    Tablet 40 milliGRAM(s) Oral before breakfast  rosuvastatin 10 milliGRAM(s) Oral at bedtime  sertraline 25 milliGRAM(s) Oral daily  trimethoprim   80 mG/sulfamethoxazole 400 mG 3 Tablet(s) Oral three times a day    MEDICATIONS  (PRN):      Allergies    No Known Allergies    Intolerances        VITALS:    Vital Signs Last 24 Hrs  T(C): 36.6 (20 Dec 2024 17:03), Max: 37.8 (20 Dec 2024 01:40)  T(F): 97.8 (20 Dec 2024 17:03), Max: 100.1 (20 Dec 2024 01:40)  HR: 76 (20 Dec 2024 17:03) (76 - 104)  BP: 122/69 (20 Dec 2024 17:03) (122/69 - 132/66)  BP(mean): --  RR: 16 (20 Dec 2024 17:03) (16 - 18)  SpO2: 94% (20 Dec 2024 17:03) (90% - 100%)    Parameters below as of 20 Dec 2024 17:03  Patient On (Oxygen Delivery Method): room air        LABS:                          9.2    6.19  )-----------( 190      ( 20 Dec 2024 06:36 )             28.3       12-20    139  |  102  |  31[H]  ----------------------------<  118[H]  3.8   |  22  |  2.14[H]    Ca    8.4      20 Dec 2024 06:37  Phos  3.6     12-20  Mg     2.2     12-20    TPro  6.1  /  Alb  3.3  /  TBili  0.3  /  DBili  x   /  AST  14  /  ALT  10  /  AlkPhos  74  12-20      CAPILLARY BLOOD GLUCOSE          PT/INR - ( 19 Dec 2024 11:01 )   PT: 12.7 sec;   INR: 1.11 ratio         PTT - ( 19 Dec 2024 11:01 )  PTT:28.4 sec    LOWER EXTREMITY PHYSICAL EXAM:  Vascular: DP/PT 1/4, B/L, CFT <3 seconds B/L, Temperature gradient warm to cool B/L.   Neuro: Epicritic sensation to level of digits   Musculoskeletal/Ortho: 1st MPJ HAV, lesser digit contractures B/L  Skin:R medial heel eschar, no acute signs of infection, L plantar 4th digi bulla with serosanguineous fluids, no acute signs of infection    RADIOLOGY & ADDITIONAL STUDIES:     Patient is a 84y old  Male who presents with a chief complaint of abnormal lab finding (20 Dec 2024 12:17)      HPI:   84-year-old Male with a history of myelodysplastic syndrome on chemo (last session 12/18), CAD, CKD, HTN, HLD, who presents with positive blood cultures for stenotrophomonas maltophilia on 12/18/24 and referred to the ED for further evaluation this afternoon. Patient reports having blood drawn prior to his chemo session yesterday as well as left ankle xray 2/2 to pain. The patient reports mild left ankle pain and believes it may be sprained. Currently on Levaquin for a UTI. Denies fever, chills, nausea, vomiting, chest pain, shortness of breath, cough, abdominal pain.    He had a previous positive culture for S. maltophilia and MRSE in June 2024 where he was seen and treated by the in-house ID team and where he was given vancomycin --> zosyn --> minocyline --> bactrim. In addition, he also has had multiple infections in the past E.faeclis (3/2024, 9/2023) and E. Coli (5/2023, 9/2022, 5/2022).     In the ED, patient afebrile, hemodynamically stable, s/p 1L LR bolus, minocycline 200mg, bactrim SS 1x, DS 1x.  (19 Dec 2024 16:48)      PAST MEDICAL & SURGICAL HISTORY:  HTN - Hypertension      Hypercholesterolemia      PC (prostate cancer)  s/p surgical resection 3 years ago      Gout      Aneurysm, ascending aorta      H/O prostatectomy      H/O carotid endarterectomy      H/O renal artery stenosis  s/p stent in Left RA      Status post cataract extraction, unspecified laterality          MEDICATIONS  (STANDING):  acyclovir   Oral Tab/Cap 400 milliGRAM(s) Oral two times a day  allopurinol 200 milliGRAM(s) Oral daily  aspirin  chewable 81 milliGRAM(s) Oral daily  chlorhexidine 4% Liquid 1 Application(s) Topical daily  clopidogrel Tablet 75 milliGRAM(s) Oral daily  fluconAZOLE   Tablet 200 milliGRAM(s) Oral daily  gabapentin 300 milliGRAM(s) Oral three times a day  heparin   Injectable 5000 Unit(s) SubCutaneous every 8 hours  influenza  Vaccine (HIGH DOSE) 0.5 milliLiter(s) IntraMuscular once  minocycline 200 milliGRAM(s) Oral two times a day  pantoprazole    Tablet 40 milliGRAM(s) Oral before breakfast  rosuvastatin 10 milliGRAM(s) Oral at bedtime  sertraline 25 milliGRAM(s) Oral daily  trimethoprim   80 mG/sulfamethoxazole 400 mG 3 Tablet(s) Oral three times a day    MEDICATIONS  (PRN):      Allergies    No Known Allergies    Intolerances        VITALS:    Vital Signs Last 24 Hrs  T(C): 36.6 (20 Dec 2024 17:03), Max: 37.8 (20 Dec 2024 01:40)  T(F): 97.8 (20 Dec 2024 17:03), Max: 100.1 (20 Dec 2024 01:40)  HR: 76 (20 Dec 2024 17:03) (76 - 104)  BP: 122/69 (20 Dec 2024 17:03) (122/69 - 132/66)  BP(mean): --  RR: 16 (20 Dec 2024 17:03) (16 - 18)  SpO2: 94% (20 Dec 2024 17:03) (90% - 100%)    Parameters below as of 20 Dec 2024 17:03  Patient On (Oxygen Delivery Method): room air        LABS:                          9.2    6.19  )-----------( 190      ( 20 Dec 2024 06:36 )             28.3       12-20    139  |  102  |  31[H]  ----------------------------<  118[H]  3.8   |  22  |  2.14[H]    Ca    8.4      20 Dec 2024 06:37  Phos  3.6     12-20  Mg     2.2     12-20    TPro  6.1  /  Alb  3.3  /  TBili  0.3  /  DBili  x   /  AST  14  /  ALT  10  /  AlkPhos  74  12-20      CAPILLARY BLOOD GLUCOSE          PT/INR - ( 19 Dec 2024 11:01 )   PT: 12.7 sec;   INR: 1.11 ratio         PTT - ( 19 Dec 2024 11:01 )  PTT:28.4 sec    LOWER EXTREMITY PHYSICAL EXAM:  Vascular: DP/PT 1/4, B/L, CFT <3 seconds B/L, Temperature gradient warm to cool B/L.   Neuro: Epicritic sensation to level of digits   Musculoskeletal/Ortho: 1st MPJ HAV, lesser digit contractures B/L  Skin: Left foot medial heel partial thickness eschar, no acute signs of infection. Right foot no open wound, no acute signs of infection.     RADIOLOGY & ADDITIONAL STUDIES:

## 2024-12-20 NOTE — DISCHARGE NOTE PROVIDER - NSFOLLOWUPCLINICS_GEN_ALL_ED_FT
Cardiology at [facility]  Cardiology  [address] [place] [address]  Phone: [phone]  Fax:   Follow Up Time: 1 week    [facility]  Nephrology  [address] [place] [address]  Phone: [phone]  Fax:   Follow Up Time: 1 week

## 2024-12-20 NOTE — CONSULT NOTE ADULT - ASSESSMENT
A 83 yo M Presented with R medial heel partial thickness eschar  - Pt seen and evaluated.  - Afebrile, WBC 6.19  - Right foot medial heel partial thickness eschar, no acute signs of infection. Left foot no open wound, no acute signs of infection.   - Left foot wound culture not obtained 2/2 absence sign of infection  - Recommended Iodosorb to be applied to the right foot eschar  - Wound care orders placed per nursing  - No podiatric surgical intervention secondary to no acute signs of infection  - No further podiatric intervention necessary stable for discharge from podiatry standpoint, please reconsult as needed.  - Wound care instructions and follow up info are listed in the provider discharge note.   - Discussed with Attending  A 83 yo M Presented with R medial heel partial thickness eschar  - Pt seen and evaluated.  - Afebrile, WBC 6.19  - Left foot medial heel partial thickness eschar, no acute signs of infection. Right foot no open wound, no acute signs of infection.   - Left foot wound culture not obtained 2/2 absence sign of infection  - Recommended Iodosorb to be applied to the right foot eschar  - Wound care orders placed per nursing  - No podiatric surgical intervention secondary to no acute signs of infection  - No further podiatric intervention necessary stable for discharge from podiatry standpoint, please reconsult as needed.  - Wound care instructions and follow up info are listed in the provider discharge note.   - Discussed with Attending

## 2024-12-20 NOTE — PHYSICAL THERAPY INITIAL EVALUATION ADULT - IMPAIRED TRANSFERS: SIT/STAND, REHAB EVAL
decrease endurance , forgetful at times , sit -stand at rolling walker mod of 2 , once stand at rolling waker max to mod of 1 and mod of PCA work on wt shift and balance in stand at walker , tendency to lean L vc to stand upright/impaired balance/impaired postural control/decreased strength

## 2024-12-20 NOTE — PHYSICAL THERAPY INITIAL EVALUATION ADULT - BED MOBILITY TRAINING, PT EVAL
GOAL: Patient will perform bed mobility independently in 2 weeks GOAL: Patient will perform bed mobility independently in  4 weeks

## 2024-12-20 NOTE — DISCHARGE NOTE PROVIDER - HOSPITAL COURSE
HPI:   84-year-old Male with a history of myelodysplastic syndrome on chemo (last session 12/18), CAD, CKD, HTN, HLD, who presents with positive blood cultures for stenotrophomonas maltophilia on 12/18/24 and referred to the ED for further evaluation this afternoon. Patient reports having blood drawn prior to his chemo session yesterday as well as left ankle xray 2/2 to pain. The patient reports mild left ankle pain and believes it may be sprained. Currently on Levaquin for a UTI. Denies fever, chills, nausea, vomiting, chest pain, shortness of breath, cough, abdominal pain.    He had a previous positive culture for S. maltophilia and MRSE in June 2024 where he was seen and treated by the in-house ID team and where he was given vancomycin --> zosyn --> minocyline --> bactrim. In addition, he also has had multiple infections in the past E.faeclis (3/2024, 9/2023) and E. Coli (5/2023, 9/2022, 5/2022).     In the ED, patient afebrile, hemodynamically stable, s/p 1L LR bolus, minocycline 200mg, bactrim SS 1x, DS 1x.  (19 Dec 2024 16:48)    Hospital Course: We consulted infectious disease who recommended to treat the S. maltophilia bacteremia with 240mg Bactrim TID and 200mg minocycline BID......    On day of discharge, patient is clinically stable with no new exam findings or acute symptoms compared to prior. The patient was seen by the attending physician on the date of discharge and deemed stable and acceptable for discharge.       Important Medication Changes and Reason:    Active or Pending Issues Requiring Follow-up:    Advanced Directives:   [ ] Full code  [ ] DNR  [ ] Hospice    Discharge Diagnoses:         HPI:   84-year-old Male with a history of myelodysplastic syndrome on chemo (last session 12/18), CAD, CKD, HTN, HLD, who presents with positive blood cultures for stenotrophomonas maltophilia on 12/18/24 and referred to the ED for further evaluation this afternoon. Patient reports having blood drawn prior to his chemo session yesterday as well as left ankle xray 2/2 to pain. The patient reports mild left ankle pain and believes it may be sprained. Currently on Levaquin for a UTI. Denies fever, chills, nausea, vomiting, chest pain, shortness of breath, cough, abdominal pain.    He had a previous positive culture for S. maltophilia and MRSE in June 2024 where he was seen and treated by the in-house ID team and where he was given vancomycin --> zosyn --> minocyline --> bactrim. In addition, he also has had multiple infections in the past E.faeclis (3/2024, 9/2023) and E. Coli (5/2023, 9/2022, 5/2022).     In the ED, patient afebrile, hemodynamically stable, s/p 1L LR bolus, minocycline 200mg, bactrim SS 1x, DS 1x.  (19 Dec 2024 16:48)    Hospital Course: We consulted infectious disease who recommended to treat the S. maltophilia bacteremia with Bactrim TID and minocycline BID. Repeat BCx at 72H was negative, TTE was performed without vegetations, and a JEAN was also performed without vegetations.    On day of discharge, patient is clinically stable with no new exam findings or acute symptoms compared to prior. The patient was seen by the attending physician on the date of discharge and deemed stable and acceptable for discharge.       Important Medication Changes and Reason:  Bactrim TID until ____  Minocycline BID until ____    Active or Pending Issues Requiring Follow-up:    Advanced Directives:   [X] Full code  [ ] DNR  [ ] Hospice    Discharge Diagnoses:  S. maltophilia bacteremia       HPI:   84-year-old Male with a history of myelodysplastic syndrome on chemo (last session 12/18), CAD, CKD, HTN, HLD, who presents with positive blood cultures for stenotrophomonas maltophilia on 12/18/24 and referred to the ED for further evaluation this afternoon. Patient reports having blood drawn prior to his chemo session yesterday as well as left ankle xray 2/2 to pain. The patient reports mild left ankle pain and believes it may be sprained. Currently on Levaquin for a UTI. Denies fever, chills, nausea, vomiting, chest pain, shortness of breath, cough, abdominal pain.    He had a previous positive culture for S. maltophilia and MRSE in June 2024 where he was seen and treated by the in-house ID team and where he was given vancomycin --> zosyn --> minocyline --> bactrim. In addition, he also has had multiple infections in the past E.faeclis (3/2024, 9/2023) and E. Coli (5/2023, 9/2022, 5/2022).     In the ED, patient afebrile, hemodynamically stable, s/p 1L LR bolus, minocycline 200mg, bactrim SS 1x, DS 1x.  (19 Dec 2024 16:48)    Hospital Course: We consulted infectious disease who recommended to treat the S. maltophilia bacteremia with Bactrim TID and minocycline BID. Repeat BCx at 72H was negative, TTE was performed without vegetations, and a JEAN was also performed without vegetations. PICC line was re-placed. Patient is to be discharged on Bactrim DS TID until 01/03/2025 per infectious disease team.    On day of discharge, patient is clinically stable with no new exam findings or acute symptoms compared to prior. The patient was seen by the attending physician on the date of discharge and deemed stable and acceptable for discharge.       Important Medication Changes and Reason:  Bactrim DS TID until 01/03/2025    Active or Pending Issues Requiring Follow-up:  Follow-up with primary care physician  Follow-up with     Advanced Directives:   [X] Full code  [ ] DNR  [ ] Hospice    Discharge Diagnoses:  S. maltophilia bacteremia       HPI:   84-year-old Male with a history of myelodysplastic syndrome on chemo (last session 12/18), CAD, CKD, HTN, HLD, who presents with positive blood cultures for stenotrophomonas maltophilia on 12/18/24 and referred to the ED for further evaluation this afternoon. Patient reports having blood drawn prior to his chemo session yesterday as well as left ankle xray 2/2 to pain. The patient reports mild left ankle pain and believes it may be sprained. Currently on Levaquin for a UTI. Denies fever, chills, nausea, vomiting, chest pain, shortness of breath, cough, abdominal pain.    He had a previous positive culture for S. maltophilia and MRSE in June 2024 where he was seen and treated by the in-house ID team and where he was given vancomycin --> zosyn --> minocyline --> bactrim. In addition, he also has had multiple infections in the past E.faeclis (3/2024, 9/2023) and E. Coli (5/2023, 9/2022, 5/2022).     In the ED, patient afebrile, hemodynamically stable, s/p 1L LR bolus, minocycline 200mg, bactrim SS 1x, DS 1x.  (19 Dec 2024 16:48)    Hospital Course: We consulted infectious disease who recommended to treat the S. maltophilia bacteremia with Bactrim TID and minocycline BID. Repeat BCx at 72H was negative, TTE was performed without vegetations, and a JEAN was also performed without vegetations. PICC line was re-placed. Patient is to be discharged on Bactrim DS TID until 01/03/2025 per infectious disease team.    On day of discharge, patient is clinically stable with no new exam findings or acute symptoms compared to prior. The patient was seen by the attending physician on the date of discharge and deemed stable and acceptable for discharge.       Important Medication Changes and Reason:  Bactrim DS TID until 01/03/2025    Active or Pending Issues Requiring Follow-up:  Follow-up with primary care physician  Follow-up with Cardiology  Follow-up with Nephrology     Advanced Directives:   [X] Full code  [ ] DNR  [ ] Hospice    Discharge Diagnoses:  S. maltophilia bacteremia

## 2024-12-20 NOTE — CONSULT NOTE ADULT - REASON FOR ADMISSION
abnormal lab finding
Statement Selected

## 2024-12-20 NOTE — PHYSICAL THERAPY INITIAL EVALUATION ADULT - LEVEL OF INDEPENDENCE: SUPINE/SIT, REHAB EVAL
moderate assist (50% patients effort) same on 12/23/24 mod of 1 supine -sit , sat x several min on EOB 12/23/24 need mod of 1 to support tendency to retropulse vc to lean forward and work on pull to sit x 7 reps mod of 1 , intermittent min of 1 , work on wt shift and sitting balance mod /min of1 mostly mod/moderate assist (50% patients effort)

## 2024-12-20 NOTE — PHYSICAL THERAPY INITIAL EVALUATION ADULT - BED MOBILITY LIMITATIONS, REHAB EVAL
decreased ability to use arms for pushing/pulling/decreased ability to use legs for bridging/pushing decreased ability to use arms for pushing/pulling/decreased ability to use legs for bridging/pushing/impaired ability to control trunk for mobility

## 2024-12-20 NOTE — DISCHARGE NOTE PROVIDER - NSDCFUADDAPPT_GEN_ALL_CORE_FT
Podiatry Discharge Instructions:  Follow up: Please follow up with Dr. Juan within 1 week of discharge from the hospital, please call [phone] for appointment and discuss that you recently were seen in the hospital.  Wound Care: Please apply iodosorb, 4x4 guaze, ABD, fernando  Weight bearing: Please wear z flows while resting in bed at all times to prewvent further decubitus ulcers.   Antibiotics: Please continue as instructed. Podiatry Discharge Instructions:  Follow up: Please follow up with Dr. Juan within 1 week of discharge from the hospital, please call [phone] for appointment and discuss that you recently were seen in the hospital.  Wound Care: Please apply iodosorb, 4x4 guaze, ABD, fernando  Weight bearing: Please wear z flows while resting in bed at all times to prewvent further decubitus ulcers.   Antibiotics: Please continue as instructed.    APPTS ARE READY TO BE MADE: [X] YES    Best Family or Patient Contact (if needed):    Additional Information about above appointments (if needed):    1: Primary Care Physician - Within 1 week of discharge     Other comments or requests:    Podiatry Discharge Instructions:  Follow up: Please follow up with Dr. Juan within 1 week of discharge from the hospital, please call [phone] for appointment and discuss that you recently were seen in the hospital.  Wound Care: Please apply iodosorb, 4x4 nilae, ABD, fernando  Weight bearing: Please wear z flows while resting in bed at all times to prewvent further decubitus ulcers.   Antibiotics: Please continue as instructed.    APPTS ARE READY TO BE MADE: [X] YES    Best Family or Patient Contact (if needed):    Additional Information about above appointments (if needed):    1: Primary Care Physician - Within 1 week of discharge   2: Hematologist - Dr. Mcmahon within 1 week of discharge    Other comments or requests:    Podiatry Discharge Instructions:  Follow up: Please follow up with Dr. Juan within 1 week of discharge from the hospital, please call [phone] for appointment and discuss that you recently were seen in the hospital.  Wound Care: Please apply iodosorb, 4x4 guaze, ABD, fernando  Weight bearing: Please wear z flows while resting in bed at all times to prewvent further decubitus ulcers.   Antibiotics: Please continue as instructed.    APPTS ARE READY TO BE MADE: [X] YES    Best Family or Patient Contact (if needed):    Additional Information about above appointments (if needed):    1: Primary Care Physician - Within 1 week of discharge   2: Hematologist - Dr. Mcmahon within 1 week of discharge  3: Cardiologist - within 1 week of discharge    Other comments or requests:    Podiatry Discharge Instructions:  Follow up: Please follow up with Dr. Juan within 1 week of discharge from the hospital, please call [phone] for appointment and discuss that you recently were seen in the hospital.  Wound Care: Please apply iodosorb, 4x4 guaze, ABD, fernando  Weight bearing: Please wear z flows while resting in bed at all times to prewvent further decubitus ulcers.   Antibiotics: Please continue as instructed.    APPTS ARE READY TO BE MADE: [X] YES    Best Family or Patient Contact (if needed):    Additional Information about above appointments (if needed):    1: Primary Care Physician - Within 1 week of discharge   2: Hematologist - Dr. Mcmahon within 1 week of discharge  3: Cardiologist - within 1 week of discharge  4: Nephrologist - within 1 week of discharge    Other comments or requests:    Podiatry Discharge Instructions:  Follow up: Please follow up with Dr. Juan within 1 week of discharge from the hospital, please call [phone] for appointment and discuss that you recently were seen in the hospital.  Wound Care: Please apply iodosorb, 4x4 guaze, ABD, fernando  Weight bearing: Please wear z flows while resting in bed at all times to prewvent further decubitus ulcers.   Antibiotics: Please continue as instructed.    APPTS ARE READY TO BE MADE: [X] YES    Best Family or Patient Contact (if needed):    Additional Information about above appointments (if needed):    1: Primary Care Physician - Within 1 week of discharge   2: Hematologist - Dr. Mcmahon within 1 week of discharge  3: Cardiologist - within 1 week of discharge  4: Nephrologist - within 1 week of discharge    Other comments or requests:     Patient is being discharged to [facility]. Caregiver will arrange follow up.

## 2024-12-20 NOTE — PROGRESS NOTE ADULT - PROBLEM SELECTOR PLAN 10
DVT: Heparin subQ  Code Status: Full Code  Diet: DASH diet w/ renal restrictions  Dispo: PT eval pending

## 2024-12-20 NOTE — PHYSICAL THERAPY INITIAL EVALUATION ADULT - IMPAIRMENTS CONTRIBUTING IMPAIRED BED MOBILITY, REHAB EVAL
impaired balance/narrow base of support/pain/decreased strength decrease endurance/impaired balance/narrow base of support/pain/decreased strength

## 2024-12-20 NOTE — PHYSICAL THERAPY INITIAL EVALUATION ADULT - IMPAIRMENTS CONTRIBUTING TO GAIT DEVIATIONS, PT EVAL
decrease endurance , balance mod unsteady/impaired balance/cognition/impaired postural control/decreased strength

## 2024-12-20 NOTE — PHYSICAL THERAPY INITIAL EVALUATION ADULT - NSPTDISCHREC_GEN_A_CORE
TBD upon functional assessment, anticipating NERI TBD upon functional assessment, anticipating NERI; 12/23/24 PT recommend Subacute rehab as pt is requiring 2 person assist at this tme to transfer and only taking a few steps with rolling walker 2 person assist w/poor balance ; pt would like to think about NERI vs Home with HPT and assist ;/Sub-acute Rehab

## 2024-12-20 NOTE — PROGRESS NOTE ADULT - PROBLEM SELECTOR PLAN 1
Pt had two separate blood cultures that were positive Stenotrophomonas maltophilia. He previously was tested positive for this in June 2024 and was also positive for MRSE. He was treated with vancomycin--> zosyn --> minocyline --> bactrim and was seen by ID at the time for treatment plan.    Plan  - f/u ID reccs  - start Bactrim 330  - continue to trend CBC and vital signs  - F/u BCx, sensitivities

## 2024-12-20 NOTE — PHYSICAL THERAPY INITIAL EVALUATION ADULT - IMPAIRMENTS FOUND, PT EVAL
gait, locomotion, and balance/muscle strength pt tired , came from echo short time ago but wanting to try to work , motivated/aerobic capacity/endurance/cognitive impairment/gait, locomotion, and balance/joint integrity and mobility/muscle strength

## 2024-12-20 NOTE — PHYSICAL THERAPY INITIAL EVALUATION ADULT - PERTINENT HX OF CURRENT PROBLEM, REHAB EVAL
84-year-old Male with a history of myelodysplastic syndrome on chemo (last session 12/18), CAD, CKD, HTN, HLD, who presents with positive blood cultures for stenotrophomonas maltophilia on 12/18/24 and referred to the ED for further evaluation this afternoon. Patient reports having blood drawn prior to his chemo session yesterday as well as left ankle xray 2/2 to pain. The patient reports mild left ankle pain and believes it may be sprained. Currently on Levaquin for a UTI. Denies fever, chills, nausea, vomiting, chest pain, shortness of breath, cough, abdominal pain.  ankle XR: No fractures or dislocations.  Chest XR: clear

## 2024-12-21 ENCOUNTER — APPOINTMENT (OUTPATIENT)
Dept: INFUSION THERAPY | Facility: HOSPITAL | Age: 84
End: 2024-12-21

## 2024-12-21 LAB
-  MINOCYCLINE: SIGNIFICANT CHANGE UP
ALBUMIN SERPL ELPH-MCNC: 3.4 G/DL — SIGNIFICANT CHANGE UP (ref 3.3–5)
ALP SERPL-CCNC: 76 U/L — SIGNIFICANT CHANGE UP (ref 40–120)
ALT FLD-CCNC: 10 U/L — SIGNIFICANT CHANGE UP (ref 10–45)
ANION GAP SERPL CALC-SCNC: 13 MMOL/L — SIGNIFICANT CHANGE UP (ref 5–17)
AST SERPL-CCNC: 12 U/L — SIGNIFICANT CHANGE UP (ref 10–40)
BASOPHILS # BLD AUTO: 0.07 K/UL — SIGNIFICANT CHANGE UP (ref 0–0.2)
BASOPHILS NFR BLD AUTO: 1.4 % — SIGNIFICANT CHANGE UP (ref 0–2)
BILIRUB SERPL-MCNC: 0.3 MG/DL — SIGNIFICANT CHANGE UP (ref 0.2–1.2)
BUN SERPL-MCNC: 27 MG/DL — HIGH (ref 7–23)
CALCIUM SERPL-MCNC: 8.4 MG/DL — SIGNIFICANT CHANGE UP (ref 8.4–10.5)
CHLORIDE SERPL-SCNC: 102 MMOL/L — SIGNIFICANT CHANGE UP (ref 96–108)
CO2 SERPL-SCNC: 22 MMOL/L — SIGNIFICANT CHANGE UP (ref 22–31)
CREAT SERPL-MCNC: 2.19 MG/DL — HIGH (ref 0.5–1.3)
CULTURE RESULTS: ABNORMAL
EGFR: 29 ML/MIN/1.73M2 — LOW
EOSINOPHIL # BLD AUTO: 0.12 K/UL — SIGNIFICANT CHANGE UP (ref 0–0.5)
EOSINOPHIL NFR BLD AUTO: 2.4 % — SIGNIFICANT CHANGE UP (ref 0–6)
GLUCOSE SERPL-MCNC: 99 MG/DL — SIGNIFICANT CHANGE UP (ref 70–99)
HCT VFR BLD CALC: 28.2 % — LOW (ref 39–50)
HGB BLD-MCNC: 9.1 G/DL — LOW (ref 13–17)
IMM GRANULOCYTES NFR BLD AUTO: 5 % — HIGH (ref 0–0.9)
LYMPHOCYTES # BLD AUTO: 0.69 K/UL — LOW (ref 1–3.3)
LYMPHOCYTES # BLD AUTO: 13.7 % — SIGNIFICANT CHANGE UP (ref 13–44)
MAGNESIUM SERPL-MCNC: 2.4 MG/DL — SIGNIFICANT CHANGE UP (ref 1.6–2.6)
MCHC RBC-ENTMCNC: 32.3 G/DL — SIGNIFICANT CHANGE UP (ref 32–36)
MCHC RBC-ENTMCNC: 35 PG — HIGH (ref 27–34)
MCV RBC AUTO: 108.5 FL — HIGH (ref 80–100)
METHOD TYPE: SIGNIFICANT CHANGE UP
MONOCYTES # BLD AUTO: 0.38 K/UL — SIGNIFICANT CHANGE UP (ref 0–0.9)
MONOCYTES NFR BLD AUTO: 7.6 % — SIGNIFICANT CHANGE UP (ref 2–14)
MRSA PCR RESULT.: SIGNIFICANT CHANGE UP
NEUTROPHILS # BLD AUTO: 3.52 K/UL — SIGNIFICANT CHANGE UP (ref 1.8–7.4)
NEUTROPHILS NFR BLD AUTO: 69.9 % — SIGNIFICANT CHANGE UP (ref 43–77)
NRBC # BLD: 0 /100 WBCS — SIGNIFICANT CHANGE UP (ref 0–0)
ORGANISM # SPEC MICROSCOPIC CNT: ABNORMAL
PHOSPHATE SERPL-MCNC: 3.8 MG/DL — SIGNIFICANT CHANGE UP (ref 2.5–4.5)
PLATELET # BLD AUTO: 178 K/UL — SIGNIFICANT CHANGE UP (ref 150–400)
POTASSIUM SERPL-MCNC: 4 MMOL/L — SIGNIFICANT CHANGE UP (ref 3.5–5.3)
POTASSIUM SERPL-SCNC: 4 MMOL/L — SIGNIFICANT CHANGE UP (ref 3.5–5.3)
PROT SERPL-MCNC: 6.2 G/DL — SIGNIFICANT CHANGE UP (ref 6–8.3)
RBC # BLD: 2.6 M/UL — LOW (ref 4.2–5.8)
RBC # FLD: 17.9 % — HIGH (ref 10.3–14.5)
S AUREUS DNA NOSE QL NAA+PROBE: SIGNIFICANT CHANGE UP
SODIUM SERPL-SCNC: 137 MMOL/L — SIGNIFICANT CHANGE UP (ref 135–145)
SPECIMEN SOURCE: SIGNIFICANT CHANGE UP
WBC # BLD: 5.03 K/UL — SIGNIFICANT CHANGE UP (ref 3.8–10.5)
WBC # FLD AUTO: 5.03 K/UL — SIGNIFICANT CHANGE UP (ref 3.8–10.5)

## 2024-12-21 PROCEDURE — 99232 SBSQ HOSP IP/OBS MODERATE 35: CPT | Mod: GC

## 2024-12-21 RX ORDER — POLYETHYLENE GLYCOL 3350 17 G/DOSE
17 POWDER (GRAM) ORAL ONCE
Refills: 0 | Status: COMPLETED | OUTPATIENT
Start: 2024-12-21 | End: 2024-12-21

## 2024-12-21 RX ORDER — SENNOSIDES 8.6 MG/1
2 TABLET, FILM COATED ORAL ONCE
Refills: 0 | Status: COMPLETED | OUTPATIENT
Start: 2024-12-21 | End: 2024-12-21

## 2024-12-21 RX ADMIN — FLUCONAZOLE 200 MILLIGRAM(S): 200 TABLET ORAL at 12:25

## 2024-12-21 RX ADMIN — GABAPENTIN 300 MILLIGRAM(S): 300 CAPSULE ORAL at 21:56

## 2024-12-21 RX ADMIN — MINOCYCLINE HYDROCHLORIDE 200 MILLIGRAM(S): 100 CAPSULE ORAL at 06:07

## 2024-12-21 RX ADMIN — ALLOPURINOL 200 MILLIGRAM(S): 100 TABLET ORAL at 12:25

## 2024-12-21 RX ADMIN — GABAPENTIN 300 MILLIGRAM(S): 300 CAPSULE ORAL at 06:06

## 2024-12-21 RX ADMIN — Medication 400 MILLIGRAM(S): at 17:24

## 2024-12-21 RX ADMIN — Medication 3 TABLET(S): at 21:56

## 2024-12-21 RX ADMIN — Medication 400 MILLIGRAM(S): at 06:06

## 2024-12-21 RX ADMIN — Medication 81 MILLIGRAM(S): at 12:25

## 2024-12-21 RX ADMIN — HEPARIN SODIUM 5000 UNIT(S): 1000 INJECTION, SOLUTION INTRAVENOUS; SUBCUTANEOUS at 13:45

## 2024-12-21 RX ADMIN — MINOCYCLINE HYDROCHLORIDE 200 MILLIGRAM(S): 100 CAPSULE ORAL at 17:24

## 2024-12-21 RX ADMIN — Medication 17 GRAM(S): at 21:55

## 2024-12-21 RX ADMIN — PANTOPRAZOLE 40 MILLIGRAM(S): 40 TABLET, DELAYED RELEASE ORAL at 06:06

## 2024-12-21 RX ADMIN — Medication 3 TABLET(S): at 06:06

## 2024-12-21 RX ADMIN — Medication 3 TABLET(S): at 13:46

## 2024-12-21 RX ADMIN — SERTRALINE HYDROCHLORIDE 25 MILLIGRAM(S): 25 TABLET ORAL at 12:25

## 2024-12-21 RX ADMIN — CLOPIDOGREL BISULFATE 75 MILLIGRAM(S): 75 TABLET, FILM COATED ORAL at 12:25

## 2024-12-21 RX ADMIN — Medication 1 APPLICATION(S): at 17:24

## 2024-12-21 RX ADMIN — GABAPENTIN 300 MILLIGRAM(S): 300 CAPSULE ORAL at 13:45

## 2024-12-21 RX ADMIN — ROSUVASTATIN 10 MILLIGRAM(S): 40 TABLET, FILM COATED ORAL at 21:56

## 2024-12-21 RX ADMIN — HEPARIN SODIUM 5000 UNIT(S): 1000 INJECTION, SOLUTION INTRAVENOUS; SUBCUTANEOUS at 21:56

## 2024-12-21 RX ADMIN — SENNOSIDES 2 TABLET(S): 8.6 TABLET, FILM COATED ORAL at 21:55

## 2024-12-21 RX ADMIN — HEPARIN SODIUM 5000 UNIT(S): 1000 INJECTION, SOLUTION INTRAVENOUS; SUBCUTANEOUS at 06:08

## 2024-12-21 NOTE — PROGRESS NOTE ADULT - PROBLEM SELECTOR PLAN 1
facial pain Pt had two separate blood cultures that were positive Stenotrophomonas maltophilia. He previously was tested positive for this in June 2024 and was also positive for MRSE. He was treated with vancomycin--> zosyn --> minocyline --> bactrim and was seen by ID at the time for treatment plan.    Plan  - C/w bactrim 240 tid and minocycline 200 bid per ID   - appreciate ID recs  - continue to trend CBC and vital signs  - F/u BCx, final sensitivities

## 2024-12-21 NOTE — PROGRESS NOTE ADULT - SUBJECTIVE AND OBJECTIVE BOX
MASOOD MACARIO ([id number])- Patient is a 84y old  Male who presents with a chief complaint of abnormal lab finding.    INTERVAL HPI/OVERNIGHT EVENTS:   Patient has no acute events overnight.    SUBJECTIVE: Pt seen at bedside; denies n/v, sob, chest pain.     PHYSICAL EXAM:  - GENERAL: Follows conversation appropriately. No acute distress.  - EYES: Tracks me in room.   - HENT: Moist mucous membranes. No scleral icterus.   - LUNGS: Clear to auscultation bilaterally. No accessory muscle use.  - CARDIOVASCULAR: Regular rate and rhythm. No murmur.  - ABDOMEN: Soft, non-tender and non-distended. No palpable masses.  - EXTREMITIES: No edema. Non-tender.  - SKIN: No rashes or lesions. Warm.  - NEUROLOGIC: No focal neurological deficits. CN II-XII grossly intact, but not individually tested.  - PSYCHIATRIC: Cooperative. Appropriate mood and affect.    VS  T(C): 36.8 (12-21-24 @ 04:00), Max: 37.1 (12-20-24 @ 20:43)  HR: 92 (12-21-24 @ 04:00) (76 - 92)  BP: 135/79 (12-21-24 @ 04:00) (114/76 - 135/79)  RR: 18 (12-21-24 @ 04:00) (16 - 18)  SpO2: 93% (12-21-24 @ 04:00) (92% - 94%)    LABS (available at time of writing 12-21-24 @ 07:47):                         9.1    5.03  )-----------( 178      ( 21 Dec 2024 06:34 )             28.2     12-21    137  |  102  |  27[H]  ----------------------------<  99  4.0   |  22  |  2.19[H]    Ca    8.4      21 Dec 2024 06:32  Phos  3.8     12-21  Mg     2.4     12-21    TPro  6.2  /  Alb  3.4  /  TBili  0.3  /  DBili  x   /  AST  12  /  ALT  10  /  AlkPhos  76  12-21    PT/INR - ( 19 Dec 2024 11:01 )   PT: 12.7 sec;   INR: 1.11 ratio         PTT - ( 19 Dec 2024 11:01 )  PTT:28.4 sec  Urinalysis Basic - ( 21 Dec 2024 06:32 )    Color: x / Appearance: x / SG: x / pH: x  Gluc: 99 mg/dL / Ketone: x  / Bili: x / Urobili: x   Blood: x / Protein: x / Nitrite: x   Leuk Esterase: x / RBC: x / WBC x   Sq Epi: x / Non Sq Epi: x / Bacteria: x          Culture - Blood (collected 12-19-24 @ 10:47)  Source: .Blood BLOOD  Gram Stain (12-20-24 @ 14:58):    Growth in aerobic bottle: Gram Negative Rods  Preliminary Report (12-20-24 @ 14:59):    Growth in aerobic bottle: Gram Negative Rods    Culture - Blood (collected 12-19-24 @ 10:30)  Source: .Blood BLOOD  Gram Stain (12-20-24 @ 19:34):    Growth in aerobic bottle: Gram Negative Rods  Preliminary Report (12-20-24 @ 19:34):    Growth in aerobic bottle: Gram Negative Rods    Culture - Blood (collected 12-18-24 @ 09:12)  Source: .Blood BLOOD  Gram Stain (12-19-24 @ 04:02):    Growth in aerobic bottle: Gram Negative Rods  Preliminary Report (12-19-24 @ 19:22):    Growth in aerobic bottle: Stenotrophomonas maltophilia    Direct identification is available within approximately 3-5    hours either by Blood Panel Multiplexed PCR or Direct    MALDI-TOF. Details: [email or web address]  Organism: Blood Culture PCR  Stenotrophomonas maltophilia (12-20-24 @ 11:55)  Organism: Stenotrophomonas maltophilia (12-20-24 @ 11:55)      Method Type: MARTHA      -  Levofloxacin: S 1      -  Trimethoprim/Sulfamethoxazole: S <=0.5/9.5  Organism: Blood Culture PCR (12-19-24 @ 04:49)      Method Type: PCR      -  Stenotrophomonas maltophilia: Detec    Culture - Blood (collected 12-18-24 @ 09:11)  Source: .Blood BLOOD  Gram Stain (12-19-24 @ 12:12):    Growth in aerobic bottle: Gram Negative Rods  Preliminary Report (12-20-24 @ 10:21):    Growth in aerobic bottle: Stenotrophomonas maltophilia    See previous culture [id number]        Medications:   acyclovir   Oral Tab/Cap 400 milliGRAM(s) Oral two times a day  allopurinol 200 milliGRAM(s) Oral daily  aspirin  chewable 81 milliGRAM(s) Oral daily  cadexomer iodine 0.9% Gel 1 Application(s) Topical daily  chlorhexidine 4% Liquid 1 Application(s) Topical daily  clopidogrel Tablet 75 milliGRAM(s) Oral daily  fluconAZOLE   Tablet 200 milliGRAM(s) Oral daily  gabapentin 300 milliGRAM(s) Oral three times a day  heparin   Injectable 5000 Unit(s) SubCutaneous every 8 hours  influenza  Vaccine (HIGH DOSE) 0.5 milliLiter(s) IntraMuscular once  minocycline 200 milliGRAM(s) Oral two times a day  pantoprazole    Tablet 40 milliGRAM(s) Oral before breakfast  rosuvastatin 10 milliGRAM(s) Oral at bedtime  sertraline 25 milliGRAM(s) Oral daily  trimethoprim   80 mG/sulfamethoxazole 400 mG 3 Tablet(s) Oral three times a day      RADIOLOGY, EKG & ADDITIONAL TESTS: Reviewed.

## 2024-12-21 NOTE — PROGRESS NOTE ADULT - PROBLEM SELECTOR PLAN 2
Pt reports pain the left ankle. X-ray was completed and did not demonstrate fracture.    Plan  - Per Podiatry, recommended Iodosorb to be applied to the right foot eschar  - Wound care orders placed per nursing  - No podiatric surgical intervention secondary to no acute signs of infection  - Appreciate podiatry recs

## 2024-12-21 NOTE — PROVIDER CONTACT NOTE (CRITICAL VALUE NOTIFICATION) - SITUATION
Blood culture on 12/19 positive for gm negative pal Aerobic bottle
BC, gram negative rods in aerobic bottle from 12/19

## 2024-12-21 NOTE — PROVIDER CONTACT NOTE (CRITICAL VALUE NOTIFICATION) - TEST AND RESULT REPORTED:
BC, gram negative rods in aerobic bottle from 12/19
Blood culture on 12/19 positive for gm negative Bari Aerobic bottle

## 2024-12-22 LAB
-  LEVOFLOXACIN: SIGNIFICANT CHANGE UP
-  MINOCYCLINE: SIGNIFICANT CHANGE UP
-  TRIMETHOPRIM/SULFAMETHOXAZOLE: SIGNIFICANT CHANGE UP
ALBUMIN SERPL ELPH-MCNC: 3.4 G/DL — SIGNIFICANT CHANGE UP (ref 3.3–5)
ALP SERPL-CCNC: 76 U/L — SIGNIFICANT CHANGE UP (ref 40–120)
ALT FLD-CCNC: 13 U/L — SIGNIFICANT CHANGE UP (ref 10–45)
ANION GAP SERPL CALC-SCNC: 14 MMOL/L — SIGNIFICANT CHANGE UP (ref 5–17)
AST SERPL-CCNC: 22 U/L — SIGNIFICANT CHANGE UP (ref 10–40)
BASOPHILS # BLD AUTO: 0.09 K/UL — SIGNIFICANT CHANGE UP (ref 0–0.2)
BASOPHILS NFR BLD AUTO: 1.5 % — SIGNIFICANT CHANGE UP (ref 0–2)
BILIRUB SERPL-MCNC: 0.2 MG/DL — SIGNIFICANT CHANGE UP (ref 0.2–1.2)
BUN SERPL-MCNC: 29 MG/DL — HIGH (ref 7–23)
CALCIUM SERPL-MCNC: 8.6 MG/DL — SIGNIFICANT CHANGE UP (ref 8.4–10.5)
CHLORIDE SERPL-SCNC: 101 MMOL/L — SIGNIFICANT CHANGE UP (ref 96–108)
CO2 SERPL-SCNC: 20 MMOL/L — LOW (ref 22–31)
CREAT SERPL-MCNC: 2.19 MG/DL — HIGH (ref 0.5–1.3)
CULTURE RESULTS: ABNORMAL
CULTURE RESULTS: ABNORMAL
EGFR: 29 ML/MIN/1.73M2 — LOW
EOSINOPHIL # BLD AUTO: 0.2 K/UL — SIGNIFICANT CHANGE UP (ref 0–0.5)
EOSINOPHIL NFR BLD AUTO: 3.4 % — SIGNIFICANT CHANGE UP (ref 0–6)
GLUCOSE SERPL-MCNC: 82 MG/DL — SIGNIFICANT CHANGE UP (ref 70–99)
HCT VFR BLD CALC: 29.5 % — LOW (ref 39–50)
HGB BLD-MCNC: 9.9 G/DL — LOW (ref 13–17)
IMM GRANULOCYTES NFR BLD AUTO: 5.5 % — HIGH (ref 0–0.9)
LYMPHOCYTES # BLD AUTO: 0.78 K/UL — LOW (ref 1–3.3)
LYMPHOCYTES # BLD AUTO: 13.1 % — SIGNIFICANT CHANGE UP (ref 13–44)
MAGNESIUM SERPL-MCNC: 2.4 MG/DL — SIGNIFICANT CHANGE UP (ref 1.6–2.6)
MCHC RBC-ENTMCNC: 33.6 G/DL — SIGNIFICANT CHANGE UP (ref 32–36)
MCHC RBC-ENTMCNC: 36.5 PG — HIGH (ref 27–34)
MCV RBC AUTO: 108.9 FL — HIGH (ref 80–100)
METHOD TYPE: SIGNIFICANT CHANGE UP
METHOD TYPE: SIGNIFICANT CHANGE UP
MONOCYTES # BLD AUTO: 0.42 K/UL — SIGNIFICANT CHANGE UP (ref 0–0.9)
MONOCYTES NFR BLD AUTO: 7 % — SIGNIFICANT CHANGE UP (ref 2–14)
NEUTROPHILS # BLD AUTO: 4.15 K/UL — SIGNIFICANT CHANGE UP (ref 1.8–7.4)
NEUTROPHILS NFR BLD AUTO: 69.5 % — SIGNIFICANT CHANGE UP (ref 43–77)
NRBC # BLD: 0 /100 WBCS — SIGNIFICANT CHANGE UP (ref 0–0)
ORGANISM # SPEC MICROSCOPIC CNT: ABNORMAL
PHOSPHATE SERPL-MCNC: 3.9 MG/DL — SIGNIFICANT CHANGE UP (ref 2.5–4.5)
PLATELET # BLD AUTO: 176 K/UL — SIGNIFICANT CHANGE UP (ref 150–400)
POTASSIUM SERPL-MCNC: 4.2 MMOL/L — SIGNIFICANT CHANGE UP (ref 3.5–5.3)
POTASSIUM SERPL-SCNC: 4.2 MMOL/L — SIGNIFICANT CHANGE UP (ref 3.5–5.3)
PROT SERPL-MCNC: 6.3 G/DL — SIGNIFICANT CHANGE UP (ref 6–8.3)
RBC # BLD: 2.71 M/UL — LOW (ref 4.2–5.8)
RBC # FLD: 17.8 % — HIGH (ref 10.3–14.5)
SODIUM SERPL-SCNC: 135 MMOL/L — SIGNIFICANT CHANGE UP (ref 135–145)
SPECIMEN SOURCE: SIGNIFICANT CHANGE UP
SPECIMEN SOURCE: SIGNIFICANT CHANGE UP
WBC # BLD: 5.97 K/UL — SIGNIFICANT CHANGE UP (ref 3.8–10.5)
WBC # FLD AUTO: 5.97 K/UL — SIGNIFICANT CHANGE UP (ref 3.8–10.5)

## 2024-12-22 PROCEDURE — 99232 SBSQ HOSP IP/OBS MODERATE 35: CPT | Mod: GC

## 2024-12-22 RX ORDER — POLYETHYLENE GLYCOL 3350 17 G/DOSE
17 POWDER (GRAM) ORAL
Refills: 0 | Status: DISCONTINUED | OUTPATIENT
Start: 2024-12-22 | End: 2024-12-25

## 2024-12-22 RX ORDER — SENNOSIDES 8.6 MG/1
2 TABLET, FILM COATED ORAL AT BEDTIME
Refills: 0 | Status: DISCONTINUED | OUTPATIENT
Start: 2024-12-22 | End: 2024-12-27

## 2024-12-22 RX ADMIN — MINOCYCLINE HYDROCHLORIDE 200 MILLIGRAM(S): 100 CAPSULE ORAL at 18:11

## 2024-12-22 RX ADMIN — MINOCYCLINE HYDROCHLORIDE 200 MILLIGRAM(S): 100 CAPSULE ORAL at 05:10

## 2024-12-22 RX ADMIN — Medication 400 MILLIGRAM(S): at 18:13

## 2024-12-22 RX ADMIN — CLOPIDOGREL BISULFATE 75 MILLIGRAM(S): 75 TABLET, FILM COATED ORAL at 11:39

## 2024-12-22 RX ADMIN — Medication 400 MILLIGRAM(S): at 05:10

## 2024-12-22 RX ADMIN — PANTOPRAZOLE 40 MILLIGRAM(S): 40 TABLET, DELAYED RELEASE ORAL at 07:12

## 2024-12-22 RX ADMIN — HEPARIN SODIUM 5000 UNIT(S): 1000 INJECTION, SOLUTION INTRAVENOUS; SUBCUTANEOUS at 16:00

## 2024-12-22 RX ADMIN — SERTRALINE HYDROCHLORIDE 25 MILLIGRAM(S): 25 TABLET ORAL at 11:40

## 2024-12-22 RX ADMIN — GABAPENTIN 300 MILLIGRAM(S): 300 CAPSULE ORAL at 16:01

## 2024-12-22 RX ADMIN — ROSUVASTATIN 10 MILLIGRAM(S): 40 TABLET, FILM COATED ORAL at 21:33

## 2024-12-22 RX ADMIN — Medication 81 MILLIGRAM(S): at 11:40

## 2024-12-22 RX ADMIN — Medication 3 TABLET(S): at 05:10

## 2024-12-22 RX ADMIN — GABAPENTIN 300 MILLIGRAM(S): 300 CAPSULE ORAL at 05:10

## 2024-12-22 RX ADMIN — HEPARIN SODIUM 5000 UNIT(S): 1000 INJECTION, SOLUTION INTRAVENOUS; SUBCUTANEOUS at 21:33

## 2024-12-22 RX ADMIN — GABAPENTIN 300 MILLIGRAM(S): 300 CAPSULE ORAL at 21:33

## 2024-12-22 RX ADMIN — ALLOPURINOL 200 MILLIGRAM(S): 100 TABLET ORAL at 11:40

## 2024-12-22 RX ADMIN — CHLORHEXIDINE GLUCONATE 1 APPLICATION(S): 1.2 RINSE ORAL at 11:44

## 2024-12-22 RX ADMIN — HEPARIN SODIUM 5000 UNIT(S): 1000 INJECTION, SOLUTION INTRAVENOUS; SUBCUTANEOUS at 05:12

## 2024-12-22 RX ADMIN — Medication 3 TABLET(S): at 21:33

## 2024-12-22 RX ADMIN — SENNOSIDES 2 TABLET(S): 8.6 TABLET, FILM COATED ORAL at 21:33

## 2024-12-22 RX ADMIN — Medication 1 APPLICATION(S): at 11:41

## 2024-12-22 RX ADMIN — Medication 3 TABLET(S): at 16:01

## 2024-12-22 RX ADMIN — FLUCONAZOLE 200 MILLIGRAM(S): 200 TABLET ORAL at 11:39

## 2024-12-22 NOTE — PROGRESS NOTE ADULT - SUBJECTIVE AND OBJECTIVE BOX
MASOOD MACARIO  84yMale  MRN: [id number]    Patient is a 84y old  Male who presents with a chief complaint of abnormal lab finding (21 Dec 2024 07:46)    Interval/Overnight Events: no events ON.   - last BM 12/20; if none by tmrw, pt amenable to enema  - otherwise status quo, denies any symptoms  - pending TTE / ?JEAN -- pt has known MR per pt  - repeat BCx x2 q72h until clearance    Subjective: Pt seen and examined at bedside. Denies fever, CP, SOB, abn pain, N/V, dysuria. Tolerating diet.      MEDICATIONS  (STANDING):  acyclovir   Oral Tab/Cap 400 milliGRAM(s) Oral two times a day  allopurinol 200 milliGRAM(s) Oral daily  aspirin  chewable 81 milliGRAM(s) Oral daily  cadexomer iodine 0.9% Gel 1 Application(s) Topical daily  chlorhexidine 4% Liquid 1 Application(s) Topical daily  clopidogrel Tablet 75 milliGRAM(s) Oral daily  fluconAZOLE   Tablet 200 milliGRAM(s) Oral daily  gabapentin 300 milliGRAM(s) Oral three times a day  heparin   Injectable 5000 Unit(s) SubCutaneous every 8 hours  influenza  Vaccine (HIGH DOSE) 0.5 milliLiter(s) IntraMuscular once  minocycline 200 milliGRAM(s) Oral two times a day  pantoprazole    Tablet 40 milliGRAM(s) Oral before breakfast  rosuvastatin 10 milliGRAM(s) Oral at bedtime  sertraline 25 milliGRAM(s) Oral daily  trimethoprim   80 mG/sulfamethoxazole 400 mG 3 Tablet(s) Oral three times a day    MEDICATIONS  (PRN):    Objective:  Vitals: Vital Signs Last 24 Hrs  T(C): 36.4 (12-22-24 @ 04:08), Max: 37.2 (12-21-24 @ 20:35)  T(F): 97.6 (12-22-24 @ 04:08), Max: 98.9 (12-21-24 @ 20:35)  HR: 99 (12-22-24 @ 04:08) (77 - 99)  BP: 130/81 (12-22-24 @ 04:08) (114/55 - 135/80)  BP(mean): --  RR: 18 (12-22-24 @ 04:08) (17 - 19)  SpO2: 93% (12-22-24 @ 04:08) (92% - 94%) on Room Air    GENERAL: NAD, lying comfortably in bed  HEENT: NCAT, EOMI, conjunctiva/sclera clear  HEART: RRR; no murmurs, rubs, gallops.   RESPIRATORY: CTA B/L, no W/R/R  ABDOMEN: Soft, Nontender, Nondistended, last BM 12/20 per pt  NEUROLOGY: grossly A&O, nonfocal, moving all extremities  EXTREMITIES: +lesions b/l LE, unchanged from prior exam. 2+ Peripheral Pulses, No clubbing, cyanosis, or edema  SKIN: warm, dry, normal color, no rash or abnormal lesions     I&O's Summary  21 Dec 2024 07:01  -  22 Dec 2024 07:00  --------------------------------------------------------  IN: 0 mL / OUT: 1850 mL / NET: -1850 mL    LABS:                      9.9    5.97  )-----------( 176      ( 22 Dec 2024 07:00 )             29.5                         9.1    5.03  )-----------( 178      ( 21 Dec 2024 06:34 )             28.2                         9.2    6.19  )-----------( 190      ( 20 Dec 2024 06:36 )             28.3     Hgb Trend: 9.9<--, 9.1<--, 9.2<--, 9.6<--, 10.1<--  12-22    135  |  101  |  29[H]  ----------------------------<  82  4.2   |  20[L]  |  2.19[H]  12-21    137  |  102  |  27[H]  ----------------------------<  99  4.0   |  22  |  2.19[H]  12-20    139  |  102  |  31[H]  ----------------------------<  118[H]  3.8   |  22  |  2.14[H]    Ca    8.6      22 Dec 2024 07:00  Ca    8.4      21 Dec 2024 06:32  Ca    8.4      20 Dec 2024 06:37  Phos  3.9     12-22  Mg     2.4     12-22    TPro  6.3  /  Alb  3.4  /  TBili  0.2  /  DBili  x   /  AST  22  /  ALT  13  /  AlkPhos  76  12-22  TPro  6.2  /  Alb  3.4  /  TBili  0.3  /  DBili  x   /  AST  12  /  ALT  10  /  AlkPhos  76  12-21  TPro  6.1  /  Alb  3.3  /  TBili  0.3  /  DBili  x   /  AST  14  /  ALT  10  /  AlkPhos  74  12-20    Creatinine Trend: 2.19<--, 2.19<--, 2.14<--, 2.06<--, 2.21<--    CAPILLARY BLOOD GLUCOSE    Urinalysis Basic - ( 22 Dec 2024 07:00 )  Color: x / Appearance: x / SG: x / pH: x  Gluc: 82 mg/dL / Ketone: x  / Bili: x / Urobili: x   Blood: x / Protein: x / Nitrite: x   Leuk Esterase: x / RBC: x / WBC x   Sq Epi: x / Non Sq Epi: x / Bacteria: x    RADIOLOGY & ADDITIONAL TESTS:

## 2024-12-22 NOTE — PROGRESS NOTE ADULT - PROBLEM SELECTOR PLAN 1
Pt had two separate blood cultures that were positive Stenotrophomonas maltophilia. He previously was tested positive for this in June 2024 and was also positive for MRSE. He was treated with vancomycin--> zosyn --> minocyline --> bactrim and was seen by ID at the time for treatment plan.    Plan  - C/w bactrim 240 tid and minocycline 200 bid per ID   - appreciate ID recs  - continue to trend CBC and vital signs  - pending TTE / ?JEAN -- pt has known MR per pt  - repeat BCx x2 q72h until clearance

## 2024-12-23 ENCOUNTER — RESULT REVIEW (OUTPATIENT)
Age: 84
End: 2024-12-23

## 2024-12-23 LAB
ALBUMIN SERPL ELPH-MCNC: 3.6 G/DL — SIGNIFICANT CHANGE UP (ref 3.3–5)
ALP SERPL-CCNC: 90 U/L — SIGNIFICANT CHANGE UP (ref 40–120)
ALT FLD-CCNC: 15 U/L — SIGNIFICANT CHANGE UP (ref 10–45)
ANION GAP SERPL CALC-SCNC: 13 MMOL/L — SIGNIFICANT CHANGE UP (ref 5–17)
AST SERPL-CCNC: 22 U/L — SIGNIFICANT CHANGE UP (ref 10–40)
BASOPHILS # BLD AUTO: 0.08 K/UL — SIGNIFICANT CHANGE UP (ref 0–0.2)
BASOPHILS NFR BLD AUTO: 1.1 % — SIGNIFICANT CHANGE UP (ref 0–2)
BILIRUB SERPL-MCNC: 0.2 MG/DL — SIGNIFICANT CHANGE UP (ref 0.2–1.2)
BUN SERPL-MCNC: 32 MG/DL — HIGH (ref 7–23)
CALCIUM SERPL-MCNC: 8.8 MG/DL — SIGNIFICANT CHANGE UP (ref 8.4–10.5)
CHLORIDE SERPL-SCNC: 103 MMOL/L — SIGNIFICANT CHANGE UP (ref 96–108)
CO2 SERPL-SCNC: 19 MMOL/L — LOW (ref 22–31)
CREAT SERPL-MCNC: 2.31 MG/DL — HIGH (ref 0.5–1.3)
EGFR: 27 ML/MIN/1.73M2 — LOW
EOSINOPHIL # BLD AUTO: 0.22 K/UL — SIGNIFICANT CHANGE UP (ref 0–0.5)
EOSINOPHIL NFR BLD AUTO: 3 % — SIGNIFICANT CHANGE UP (ref 0–6)
GLUCOSE SERPL-MCNC: 89 MG/DL — SIGNIFICANT CHANGE UP (ref 70–99)
HCT VFR BLD CALC: 30.5 % — LOW (ref 39–50)
HGB BLD-MCNC: 10.1 G/DL — LOW (ref 13–17)
IMM GRANULOCYTES NFR BLD AUTO: 3.4 % — HIGH (ref 0–0.9)
LYMPHOCYTES # BLD AUTO: 0.93 K/UL — LOW (ref 1–3.3)
LYMPHOCYTES # BLD AUTO: 12.7 % — LOW (ref 13–44)
MAGNESIUM SERPL-MCNC: 2.3 MG/DL — SIGNIFICANT CHANGE UP (ref 1.6–2.6)
MCHC RBC-ENTMCNC: 33.1 G/DL — SIGNIFICANT CHANGE UP (ref 32–36)
MCHC RBC-ENTMCNC: 36.6 PG — HIGH (ref 27–34)
MCV RBC AUTO: 110.5 FL — HIGH (ref 80–100)
MONOCYTES # BLD AUTO: 0.42 K/UL — SIGNIFICANT CHANGE UP (ref 0–0.9)
MONOCYTES NFR BLD AUTO: 5.7 % — SIGNIFICANT CHANGE UP (ref 2–14)
NEUTROPHILS # BLD AUTO: 5.44 K/UL — SIGNIFICANT CHANGE UP (ref 1.8–7.4)
NEUTROPHILS NFR BLD AUTO: 74.1 % — SIGNIFICANT CHANGE UP (ref 43–77)
NRBC # BLD: 0 /100 WBCS — SIGNIFICANT CHANGE UP (ref 0–0)
PHOSPHATE SERPL-MCNC: 4 MG/DL — SIGNIFICANT CHANGE UP (ref 2.5–4.5)
PLATELET # BLD AUTO: 183 K/UL — SIGNIFICANT CHANGE UP (ref 150–400)
POTASSIUM SERPL-MCNC: 4.4 MMOL/L — SIGNIFICANT CHANGE UP (ref 3.5–5.3)
POTASSIUM SERPL-SCNC: 4.4 MMOL/L — SIGNIFICANT CHANGE UP (ref 3.5–5.3)
PROT SERPL-MCNC: 6.6 G/DL — SIGNIFICANT CHANGE UP (ref 6–8.3)
RBC # BLD: 2.76 M/UL — LOW (ref 4.2–5.8)
RBC # FLD: 17.7 % — HIGH (ref 10.3–14.5)
SODIUM SERPL-SCNC: 135 MMOL/L — SIGNIFICANT CHANGE UP (ref 135–145)
WBC # BLD: 7.34 K/UL — SIGNIFICANT CHANGE UP (ref 3.8–10.5)
WBC # FLD AUTO: 7.34 K/UL — SIGNIFICANT CHANGE UP (ref 3.8–10.5)

## 2024-12-23 PROCEDURE — 99233 SBSQ HOSP IP/OBS HIGH 50: CPT | Mod: GC

## 2024-12-23 PROCEDURE — 93325 DOPPLER ECHO COLOR FLOW MAPG: CPT | Mod: 26

## 2024-12-23 PROCEDURE — 99232 SBSQ HOSP IP/OBS MODERATE 35: CPT

## 2024-12-23 PROCEDURE — 93308 TTE F-UP OR LMTD: CPT | Mod: 26

## 2024-12-23 RX ORDER — MINERAL OIL
133 OIL (ML) MISCELLANEOUS ONCE
Refills: 0 | Status: DISCONTINUED | OUTPATIENT
Start: 2024-12-23 | End: 2024-12-23

## 2024-12-23 RX ORDER — SULFAMETHOXAZOLE/TRIMETHOPRIM 800-160 MG
1 TABLET ORAL DAILY
Refills: 0 | Status: DISCONTINUED | OUTPATIENT
Start: 2024-12-23 | End: 2024-12-23

## 2024-12-23 RX ORDER — SULFAMETHOXAZOLE/TRIMETHOPRIM 800-160 MG
1 TABLET ORAL EVERY 8 HOURS
Refills: 0 | Status: DISCONTINUED | OUTPATIENT
Start: 2024-12-23 | End: 2024-12-27

## 2024-12-23 RX ORDER — GABAPENTIN 300 MG/1
300 CAPSULE ORAL
Refills: 0 | Status: DISCONTINUED | OUTPATIENT
Start: 2024-12-23 | End: 2024-12-27

## 2024-12-23 RX ORDER — SULFAMETHOXAZOLE/TRIMETHOPRIM 800-160 MG
1 TABLET ORAL THREE TIMES A DAY
Refills: 0 | Status: DISCONTINUED | OUTPATIENT
Start: 2024-12-23 | End: 2024-12-23

## 2024-12-23 RX ADMIN — HEPARIN SODIUM 5000 UNIT(S): 1000 INJECTION, SOLUTION INTRAVENOUS; SUBCUTANEOUS at 14:21

## 2024-12-23 RX ADMIN — Medication 400 MILLIGRAM(S): at 05:29

## 2024-12-23 RX ADMIN — HEPARIN SODIUM 5000 UNIT(S): 1000 INJECTION, SOLUTION INTRAVENOUS; SUBCUTANEOUS at 05:14

## 2024-12-23 RX ADMIN — MINOCYCLINE HYDROCHLORIDE 200 MILLIGRAM(S): 100 CAPSULE ORAL at 17:39

## 2024-12-23 RX ADMIN — SERTRALINE HYDROCHLORIDE 25 MILLIGRAM(S): 25 TABLET ORAL at 12:19

## 2024-12-23 RX ADMIN — Medication 3 TABLET(S): at 05:14

## 2024-12-23 RX ADMIN — FLUCONAZOLE 200 MILLIGRAM(S): 200 TABLET ORAL at 12:19

## 2024-12-23 RX ADMIN — ALLOPURINOL 200 MILLIGRAM(S): 100 TABLET ORAL at 12:19

## 2024-12-23 RX ADMIN — Medication 81 MILLIGRAM(S): at 12:19

## 2024-12-23 RX ADMIN — ROSUVASTATIN 10 MILLIGRAM(S): 40 TABLET, FILM COATED ORAL at 21:30

## 2024-12-23 RX ADMIN — MINOCYCLINE HYDROCHLORIDE 200 MILLIGRAM(S): 100 CAPSULE ORAL at 05:14

## 2024-12-23 RX ADMIN — Medication 1 TABLET(S): at 21:30

## 2024-12-23 RX ADMIN — CLOPIDOGREL BISULFATE 75 MILLIGRAM(S): 75 TABLET, FILM COATED ORAL at 12:19

## 2024-12-23 RX ADMIN — PANTOPRAZOLE 40 MILLIGRAM(S): 40 TABLET, DELAYED RELEASE ORAL at 05:14

## 2024-12-23 RX ADMIN — GABAPENTIN 300 MILLIGRAM(S): 300 CAPSULE ORAL at 05:14

## 2024-12-23 RX ADMIN — Medication 3 TABLET(S): at 14:21

## 2024-12-23 RX ADMIN — Medication 17 GRAM(S): at 17:40

## 2024-12-23 RX ADMIN — Medication 1 APPLICATION(S): at 12:24

## 2024-12-23 RX ADMIN — GABAPENTIN 300 MILLIGRAM(S): 300 CAPSULE ORAL at 14:20

## 2024-12-23 RX ADMIN — HEPARIN SODIUM 5000 UNIT(S): 1000 INJECTION, SOLUTION INTRAVENOUS; SUBCUTANEOUS at 21:31

## 2024-12-23 RX ADMIN — CHLORHEXIDINE GLUCONATE 1 APPLICATION(S): 1.2 RINSE ORAL at 12:23

## 2024-12-23 RX ADMIN — SENNOSIDES 2 TABLET(S): 8.6 TABLET, FILM COATED ORAL at 21:30

## 2024-12-23 RX ADMIN — Medication 400 MILLIGRAM(S): at 17:39

## 2024-12-23 NOTE — PROGRESS NOTE ADULT - PROBLEM SELECTOR PLAN 10
DVT: Heparin subQ  Code Status: Full Code  Diet: DASH diet w/ renal restrictions  Dispo: PT eval pending DVT: Heparin subQ  Code Status: Full Code  Diet: DASH diet w/ renal restrictions  Dispo: PT recommending NERI

## 2024-12-23 NOTE — PROGRESS NOTE ADULT - ASSESSMENT
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    CXR neg. Likely PICC as a source. Patient has a systolic murmur w/ recurrence of Stenotrophomonas bacteremia. Would recommend IE workup. PICC line removed 12/20. Heel DTI management per podiatry, not considered a source at this time.    Cultures:  12/18 BCx Stenotrophomonas maltophilia (2 out of 4)  12/19 BCx Stenotrophomonas maltophilia (2 out of 4)    Antimicrobials:  TMP/SMX 12/19-  Minocycline 12/20-  Fluconazole ppx  Acyclovir ppx      #Stenotrophomonas bacteremia  #MDS on chemo  #Recurrent bacteremia  #Systolic murmurs    Recommendations:  - TTE. Possibly JEAN  - Decrease Bactrim to DS 1 tab Q8H. Discussed with ID pharmacist re: dosing  - Continue with minocycline 200 mg PO Q12H  - Repeat BCx x2 sets until clears      Plan discussed with primary team house staff.  Thank you for this consult. Inpatient ID team will follow.    Bert Jordan MD, PhD  Attending Physician  Division of Infectious Diseases  Department of Medicine    Please contact through MS Teams message.  Office: [phone] (after 5 PM or weekend)

## 2024-12-23 NOTE — PROGRESS NOTE ADULT - SUBJECTIVE AND OBJECTIVE BOX
Overnight events noted      VITAL:  T(C): , Max: 36.8 (12-23-24 @ 10:45)  T(F): , Max: 98.2 (12-23-24 @ 10:45)  HR: 88 (12-23-24 @ 10:45)  BP: 139/62 (12-23-24 @ 10:45)  BP(mean): --  RR: 18 (12-23-24 @ 10:45)  SpO2: 94% (12-23-24 @ 10:45)  Wt(kg): --      PHYSICAL EXAM:  Constitutional: Frail; lethargic but alert, NAD  HEENT: NCAT, DMM  Neck: Supple, No JVD  Respiratory: CTA-b/l  Cardiovascular: RRR s1s2, (+)2/6 LESA  Gastrointestinal: BS+, soft, NT/ND  Extremities: No peripheral edema b/l  Neurological: reduced generalized strength  Back: no CVAT b/l  Skin: No rashes, no nevi    LABS:                        10.1   7.34  )-----------( 183      ( 23 Dec 2024 07:17 )             30.5     Na(135)/K(4.4)/Cl(103)/HCO3(19)/BUN(32)/Cr(2.31)Glu(89)/Ca(8.8)/Mg(2.3)/PO4(4.0)    12-23 @ 07:16  Na(135)/K(4.2)/Cl(101)/HCO3(20)/BUN(29)/Cr(2.19)Glu(82)/Ca(8.6)/Mg(2.4)/PO4(3.9)    12-22 @ 07:00  Na(137)/K(4.0)/Cl(102)/HCO3(22)/BUN(27)/Cr(2.19)Glu(99)/Ca(8.4)/Mg(2.4)/PO4(3.8)    12-21 @ 06:32        IMPRESSION: 84M w/ HTN, gout, HFrEF, AS, USHA, CKD4, and MDS-chemo, 12/19/24 p/w stenotrophomonas bacteremia    (1)Renal - CKD4 - USHA-associated - at/near baseline GFR  (2)Lytes - acceptable  (3)ID -stenotrophomonas bacteremia - ID on board - likely due to infected PICC - PICC removed - on PO Bactrim  (4)CV - acceptable BP/volume      RECOMMEND:  (1)Abx for GFR 20-30ml/min  (2)BMP q1-2 days          Edis Cabral MD  [facility]  Office/on call physician: [phone]  Cell (7a-7p): [phone]       Overnight events noted      VITAL:  T(C): , Max: 36.8 (12-23-24 @ 10:45)  T(F): , Max: 98.2 (12-23-24 @ 10:45)  HR: 88 (12-23-24 @ 10:45)  BP: 139/62 (12-23-24 @ 10:45)  BP(mean): --  RR: 18 (12-23-24 @ 10:45)  SpO2: 94% (12-23-24 @ 10:45)  Wt(kg): --      PHYSICAL EXAM:  Constitutional: Frail; lethargic but alert, NAD  HEENT: NCAT, DMM  Neck: Supple, No JVD  Respiratory: CTA-b/l  Cardiovascular: RRR s1s2, (+)2/6 LESA  Gastrointestinal: BS+, soft, NT/ND  Extremities: No peripheral edema b/l  Neurological: reduced generalized strength  Back: no CVAT b/l  Skin: No rashes, no nevi    LABS:                        10.1   7.34  )-----------( 183      ( 23 Dec 2024 07:17 )             30.5     Na(135)/K(4.4)/Cl(103)/HCO3(19)/BUN(32)/Cr(2.31)Glu(89)/Ca(8.8)/Mg(2.3)/PO4(4.0)    12-23 @ 07:16  Na(135)/K(4.2)/Cl(101)/HCO3(20)/BUN(29)/Cr(2.19)Glu(82)/Ca(8.6)/Mg(2.4)/PO4(3.9)    12-22 @ 07:00  Na(137)/K(4.0)/Cl(102)/HCO3(22)/BUN(27)/Cr(2.19)Glu(99)/Ca(8.4)/Mg(2.4)/PO4(3.8)    12-21 @ 06:32        IMPRESSION: 84M w/ HTN, gout, HFrEF, AS, USHA, CKD4, and MDS-chemo, 12/19/24 p/w stenotrophomonas bacteremia    (1)Renal - CKD4 - USHA-associated - at/near baseline GFR  (2)Lytes - acceptable  (3)ID -stenotrophomonas bacteremia - ID on board - likely due to infected PICC - PICC removed - on PO Bactrim  (4)CV - acceptable BP/volume      RECOMMEND:  (1)Abx for GFR 20-30ml/min  (2)BMP q1-2 days  (3)If for discharge, he can f/u at my office on Wed 3/26/25 at 10am as previously scheduled        Edis Cabral MD  [facility]  Office/on call physician: [phone]  Cell (7a-7p): [phone]       (+)fatigue, (+)intermittent lightheadedness      VITAL:  T(C): , Max: 36.8 (12-23-24 @ 10:45)  T(F): , Max: 98.2 (12-23-24 @ 10:45)  HR: 88 (12-23-24 @ 10:45)  BP: 139/62 (12-23-24 @ 10:45)  BP(mean): --  RR: 18 (12-23-24 @ 10:45)  SpO2: 94% (12-23-24 @ 10:45)  Wt(kg): --      PHYSICAL EXAM:  Constitutional: Frail; lethargic but alert, NAD  HEENT: NCAT, DMM  Neck: Supple, No JVD  Respiratory: CTA-b/l  Cardiovascular: RRR s1s2, (+)2/6 LESA  Gastrointestinal: BS+, soft, NT/ND  Extremities: No peripheral edema b/l  Neurological: reduced generalized strength  Back: no CVAT b/l  Skin: No rashes, no nevi    LABS:                        10.1   7.34  )-----------( 183      ( 23 Dec 2024 07:17 )             30.5     Na(135)/K(4.4)/Cl(103)/HCO3(19)/BUN(32)/Cr(2.31)Glu(89)/Ca(8.8)/Mg(2.3)/PO4(4.0)    12-23 @ 07:16  Na(135)/K(4.2)/Cl(101)/HCO3(20)/BUN(29)/Cr(2.19)Glu(82)/Ca(8.6)/Mg(2.4)/PO4(3.9)    12-22 @ 07:00  Na(137)/K(4.0)/Cl(102)/HCO3(22)/BUN(27)/Cr(2.19)Glu(99)/Ca(8.4)/Mg(2.4)/PO4(3.8)    12-21 @ 06:32        IMPRESSION: 84M w/ HTN, gout, HFrEF, AS, USHA, CKD4, and MDS-chemo, 12/19/24 p/w stenotrophomonas bacteremia    (1)Renal - CKD4 - USHA-associated - at/near baseline GFR  (2)Lytes - acceptable  (3)ID -stenotrophomonas bacteremia - ID on board - likely due to infected PICC - PICC removed - on PO Bactrim  (4)CV - acceptable BP/volume      RECOMMEND:  (1)Abx for GFR 20-30ml/min  (2)BMP q1-2 days  (3)If for discharge, he can f/u at my office on Wed 3/26/25 at 10am as previously scheduled        Edis Cabral MD  [facility]  Office/on call physician: [phone]  Cell (7a-7u): [phone]

## 2024-12-23 NOTE — PROGRESS NOTE ADULT - PROBLEM SELECTOR PLAN 1
Pt had two separate blood cultures that were positive Stenotrophomonas maltophilia. He previously was tested positive for this in June 2024 and was also positive for MRSE. He was treated with vancomycin--> zosyn --> minocyline --> bactrim and was seen by ID at the time for treatment plan.    Plan  - C/w bactrim 240 tid and minocycline 200 bid per ID   - appreciate ID recs  - continue to trend CBC and vital signs  - pending TTE / ?JEAN -- pt has known MR per pt  - repeat BCx x2 q72h until clearance Pt had two separate blood cultures that were positive Stenotrophomonas maltophilia. He previously was tested positive for this in June 2024 and was also positive for MRSE. He was treated with vancomycin--> zosyn --> minocyline --> bactrim and was seen by ID at the time for treatment plan.  Likely 2/2 PICC line which has now been removed    Plan  - c/w bactrim 240 tid and minocycline 200 bid per ID   - f/u ID recs  - pending TTE / JEAN  - consult cardiology for JEAN  - repeat BCx x2 q72h until clearance (last sent 12/23)

## 2024-12-23 NOTE — PROGRESS NOTE ADULT - SUBJECTIVE AND OBJECTIVE BOX
Follow Up:    Stenotrophomonas bacteremia    Interval History/ROS:  VSS ON. Patient was seen and examined at bedside. Denies fever. No chest pain.     Allergies  No Known Allergies        ANTIMICROBIALS:  acyclovir   Oral Tab/Cap 400 two times a day  fluconAZOLE   Tablet 200 daily  minocycline 200 two times a day  trimethoprim  160 mG/sulfamethoxazole 800 mG 1 every 8 hours      OTHER MEDS:  MEDICATIONS  (STANDING):  allopurinol 200 daily  aspirin  chewable 81 daily  clopidogrel Tablet 75 daily  gabapentin 300 three times a day  heparin   Injectable 5000 every 8 hours  influenza  Vaccine (HIGH DOSE) 0.5 once  mineral oil enema 133 once  pantoprazole    Tablet 40 before breakfast  polyethylene glycol 3350 17 two times a day PRN  rosuvastatin 10 at bedtime  senna 2 at bedtime  sertraline 25 daily      Vital Signs Last 24 Hrs  T(C): 36.8 (23 Dec 2024 11:40), Max: 36.8 (23 Dec 2024 10:45)  T(F): 98.3 (23 Dec 2024 11:40), Max: 98.3 (23 Dec 2024 11:40)  HR: 80 (23 Dec 2024 11:40) (80 - 91)  BP: 140/86 (23 Dec 2024 11:40) (128/75 - 140/89)  BP(mean): --  RR: 18 (23 Dec 2024 11:40) (18 - 18)  SpO2: 96% (23 Dec 2024 11:40) (93% - 96%)    Parameters below as of 23 Dec 2024 11:40  Patient On (Oxygen Delivery Method): room air          PHYSICAL EXAM:  Constitutional: non-toxic, no distress  ENT:  supple  Cardiovascular:   normal S1, S2, RRR; +systolic murmur  Respiratory:  clear BS bilaterally, no wheezes, no rales  Musculoskeletal:  no BUE/BLE edema; L heel DTI w/o purulence  Neurologic: awake and alert  Skin:  RUE PICC s/p removal, no tenderness or purulence                          10.1   7.34  )-----------( 183      ( 23 Dec 2024 07:17 )             30.5       12-23    135  |  103  |  32[H]  ----------------------------<  89  4.4   |  19[L]  |  2.31[H]    Ca    8.8      23 Dec 2024 07:16  Phos  4.0     12-23  Mg     2.3     12-23    TPro  6.6  /  Alb  3.6  /  TBili  0.2  /  DBili  x   /  AST  22  /  ALT  15  /  AlkPhos  90  12-23      Urinalysis Basic - ( 23 Dec 2024 07:16 )    Color: x / Appearance: x / SG: x / pH: x  Gluc: 89 mg/dL / Ketone: x  / Bili: x / Urobili: x   Blood: x / Protein: x / Nitrite: x   Leuk Esterase: x / RBC: x / WBC x   Sq Epi: x / Non Sq Epi: x / Bacteria: x        MICROBIOLOGY:  v  .Surgical Swab  12-20-24   No growth  --  --      Clean Catch Clean Catch (Midstream)  12-19-24   >100,000 CFU/ml Staphylococcus epidermidis "Susceptibilities not  performed"  --  --      .Blood BLOOD  12-19-24   Growth in aerobic bottle: Stenotrophomonas maltophilia  --  Stenotrophomonas maltophilia      .Blood BLOOD  12-19-24   Growth in aerobic bottle: Stenotrophomonas maltophilia  See previous culture [id number]  --    Growth in aerobic bottle: Gram Negative Rods      .Blood BLOOD  12-18-24   Growth in aerobic bottle: Stenotrophomonas maltophilia  Direct identification is available within approximately 3-5  hours either by Blood Panel Multiplexed PCR or Direct  MALDI-TOF. Details: [email or web address]  --  Blood Culture PCR  Stenotrophomonas maltophilia      .Blood BLOOD  12-18-24   Growth in aerobic bottle: Stenotrophomonas maltophilia  See previous culture [id number]  --    Growth in aerobic bottle: Gram Negative Rods                RADIOLOGY:  Imaging below independently reviewed.  < from: Xray Chest 2 Views PA/Lat (12.19.24 @ 11:19) >    ACC: [id number] EXAM:  XR CHEST PA LAT 2V   ORDERED BY: ADE CM     PROCEDURE DATE:  12/19/2024          INTERPRETATION:  EXAMINATION: XR CHEST PA AND LATERAL    CLINICAL INDICATION: Sepsis    TECHNIQUE: 2 views; Frontal and lateral views of the chest were obtained.    COMPARISON: Chest x-ray 6/17/2024.    FINDINGS:  Right upper extremity PICC terminates in the SVC. Surgical clips in the   neck are noted.  The heart is normal in size.  The lungs are clear.  There is no pneumothorax or pleural effusion.  No acute osseous abnormalities. Mild bilateral glenohumeral joint   arthrosis. Degenerative changes of the thoracic spine.    IMPRESSION:  Clear lungs.    --- End of Report ---          BELKIS ANGELES DO; Resident Radiologist  This document has been electronically signed.  NAHID KOVACS MD; Attending Radiologist  This document has been electronically signed. Dec 19 2024 11:29AM    < end of copied text >

## 2024-12-23 NOTE — PROGRESS NOTE ADULT - SUBJECTIVE AND OBJECTIVE BOX
SUBJECTIVE / OVERNIGHT EVENTS:  Pt seen and examined at bedside. SILVANO.    MEDICATIONS  (STANDING):  acyclovir   Oral Tab/Cap 400 milliGRAM(s) Oral two times a day  allopurinol 200 milliGRAM(s) Oral daily  aspirin  chewable 81 milliGRAM(s) Oral daily  cadexomer iodine 0.9% Gel 1 Application(s) Topical daily  chlorhexidine 4% Liquid 1 Application(s) Topical daily  clopidogrel Tablet 75 milliGRAM(s) Oral daily  fluconAZOLE   Tablet 200 milliGRAM(s) Oral daily  gabapentin 300 milliGRAM(s) Oral three times a day  heparin   Injectable 5000 Unit(s) SubCutaneous every 8 hours  influenza  Vaccine (HIGH DOSE) 0.5 milliLiter(s) IntraMuscular once  minocycline 200 milliGRAM(s) Oral two times a day  pantoprazole    Tablet 40 milliGRAM(s) Oral before breakfast  rosuvastatin 10 milliGRAM(s) Oral at bedtime  senna 2 Tablet(s) Oral at bedtime  sertraline 25 milliGRAM(s) Oral daily  trimethoprim   80 mG/sulfamethoxazole 400 mG 3 Tablet(s) Oral three times a day    MEDICATIONS  (PRN):  polyethylene glycol 3350 17 Gram(s) Oral two times a day PRN Constipation          PHYSICAL EXAM:  Vital Signs Last 24 Hrs  T(C): 36.3 (23 Dec 2024 04:00), Max: 36.4 (22 Dec 2024 20:36)  T(F): 97.3 (23 Dec 2024 04:00), Max: 97.6 (22 Dec 2024 20:36)  HR: 91 (23 Dec 2024 04:00) (89 - 91)  BP: 140/89 (23 Dec 2024 04:00) (128/75 - 140/89)  BP(mean): --  RR: 18 (23 Dec 2024 04:00) (18 - 18)  SpO2: 93% (23 Dec 2024 04:00) (93% - 95%)    Parameters below as of 23 Dec 2024 04:00  Patient On (Oxygen Delivery Method): room air        CAPILLARY BLOOD GLUCOSE        I&O's Summary    GENERAL: NAD, lying comfortably in bed  HEENT: NCAT, EOMI, conjunctiva/sclera clear  HEART: RRR; no murmurs, rubs, gallops.   RESPIRATORY: CTA B/L, no W/R/R  ABDOMEN: Soft, Nontender, Nondistended, last BM 12/20 per pt  NEUROLOGY: grossly A&O, nonfocal, moving all extremities  EXTREMITIES: +lesions b/l LE, unchanged from prior exam. 2+ Peripheral Pulses, No clubbing, cyanosis, or edema  SKIN: warm, dry, normal color, no rash or abnormal lesions     LABS:                        9.9    5.97  )-----------( 176      ( 22 Dec 2024 07:00 )             29.5     12-22    135  |  101  |  29[H]  ----------------------------<  82  4.2   |  20[L]  |  2.19[H]    Ca    8.6      22 Dec 2024 07:00  Phos  3.9     12-22  Mg     2.4     12-22    TPro  6.3  /  Alb  3.4  /  TBili  0.2  /  DBili  x   /  AST  22  /  ALT  13  /  AlkPhos  76  12-22          Urinalysis Basic - ( 22 Dec 2024 07:00 )    Color: x / Appearance: x / SG: x / pH: x  Gluc: 82 mg/dL / Ketone: x  / Bili: x / Urobili: x   Blood: x / Protein: x / Nitrite: x   Leuk Esterase: x / RBC: x / WBC x   Sq Epi: x / Non Sq Epi: x / Bacteria: x        Culture - Wound Aerobic/Anaerobic (collected 20 Dec 2024 07:51)  Source: .Surgical Swab  Preliminary Report (22 Dec 2024 14:06):    No growth        IMAGING:    [X] All pertinent imaging reviewed by me SUBJECTIVE / OVERNIGHT EVENTS:  Pt seen and examined at bedside. SILVANO. Feeling weak this morning, agreeable to enema as per discussion with team yesterday.    MEDICATIONS  (STANDING):  acyclovir   Oral Tab/Cap 400 milliGRAM(s) Oral two times a day  allopurinol 200 milliGRAM(s) Oral daily  aspirin  chewable 81 milliGRAM(s) Oral daily  cadexomer iodine 0.9% Gel 1 Application(s) Topical daily  chlorhexidine 4% Liquid 1 Application(s) Topical daily  clopidogrel Tablet 75 milliGRAM(s) Oral daily  fluconAZOLE   Tablet 200 milliGRAM(s) Oral daily  gabapentin 300 milliGRAM(s) Oral three times a day  heparin   Injectable 5000 Unit(s) SubCutaneous every 8 hours  influenza  Vaccine (HIGH DOSE) 0.5 milliLiter(s) IntraMuscular once  minocycline 200 milliGRAM(s) Oral two times a day  pantoprazole    Tablet 40 milliGRAM(s) Oral before breakfast  rosuvastatin 10 milliGRAM(s) Oral at bedtime  senna 2 Tablet(s) Oral at bedtime  sertraline 25 milliGRAM(s) Oral daily  trimethoprim   80 mG/sulfamethoxazole 400 mG 3 Tablet(s) Oral three times a day    MEDICATIONS  (PRN):  polyethylene glycol 3350 17 Gram(s) Oral two times a day PRN Constipation          PHYSICAL EXAM:  Vital Signs Last 24 Hrs  T(C): 36.3 (23 Dec 2024 04:00), Max: 36.4 (22 Dec 2024 20:36)  T(F): 97.3 (23 Dec 2024 04:00), Max: 97.6 (22 Dec 2024 20:36)  HR: 91 (23 Dec 2024 04:00) (89 - 91)  BP: 140/89 (23 Dec 2024 04:00) (128/75 - 140/89)  BP(mean): --  RR: 18 (23 Dec 2024 04:00) (18 - 18)  SpO2: 93% (23 Dec 2024 04:00) (93% - 95%)    Parameters below as of 23 Dec 2024 04:00  Patient On (Oxygen Delivery Method): room air        CAPILLARY BLOOD GLUCOSE        I&O's Summary    GENERAL: NAD, lying comfortably in bed  HEENT: NCAT, EOMI, conjunctiva/sclera clear  HEART: RRR; no murmurs, rubs, gallops.   RESPIRATORY: CTA B/L, no W/R/R  ABDOMEN: Soft, Nontender, Nondistended, last BM 12/20 per pt  NEUROLOGY: grossly A&O, nonfocal, moving all extremities  EXTREMITIES: +lesions b/l LE, unchanged from prior exam. 2+ Peripheral Pulses, No clubbing, cyanosis, or edema  SKIN: warm, dry, normal color, no rash or abnormal lesions     LABS:                        9.9    5.97  )-----------( 176      ( 22 Dec 2024 07:00 )             29.5     12-22    135  |  101  |  29[H]  ----------------------------<  82  4.2   |  20[L]  |  2.19[H]    Ca    8.6      22 Dec 2024 07:00  Phos  3.9     12-22  Mg     2.4     12-22    TPro  6.3  /  Alb  3.4  /  TBili  0.2  /  DBili  x   /  AST  22  /  ALT  13  /  AlkPhos  76  12-22          Urinalysis Basic - ( 22 Dec 2024 07:00 )    Color: x / Appearance: x / SG: x / pH: x  Gluc: 82 mg/dL / Ketone: x  / Bili: x / Urobili: x   Blood: x / Protein: x / Nitrite: x   Leuk Esterase: x / RBC: x / WBC x   Sq Epi: x / Non Sq Epi: x / Bacteria: x        Culture - Wound Aerobic/Anaerobic (collected 20 Dec 2024 07:51)  Source: .Surgical Swab  Preliminary Report (22 Dec 2024 14:06):    No growth        IMAGING:    [X] All pertinent imaging reviewed by me

## 2024-12-23 NOTE — PHARMACOTHERAPY INTERVENTION NOTE - COMMENTS
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    Patient's creatinine is bumping up since admission, CrCl <30 mL/min.    Recommendation(s):  Suggest decreasing bactrim dose to 160 mg TMP (1 double strength tab) PO Q8H, continue to monitor creatinine to assess for need of further dose adjustments.    With kind regards,  Shaheen Bran, PharmD, BCIDP  Infectious Diseases Clinical Pharmacist  Available on [employer] Teams  .

## 2024-12-23 NOTE — PROGRESS NOTE ADULT - PROBLEM SELECTOR PLAN 5
Plan  - Renally dose all medications  -Avoid nephrotoxins, NSAIDS Plan  - Renally dose all medications  - Avoid nephrotoxins, NSAIDS

## 2024-12-23 NOTE — PROGRESS NOTE ADULT - PROBLEM SELECTOR PLAN 4
- hold dacogen while in hospital  - Cw antifungal and antiviral prophylaxis (pt on fluconazole 200mg daily and acyclovir 400mg BID as outpatient); pt also normally on levofloxacin 500mg daily as outpatient- can hold while on broad spectrum antibiotics   - pt to f/u with outpatient hematologist Dr. Mcmahon after discharge- notify Heme/onc prior to discharge  -appreciate heme onc recs - hold dacogen while in hospital  - c/w antifungal and antiviral prophylaxis (pt on fluconazole 200mg daily and acyclovir 400mg BID as outpatient); pt also normally on levofloxacin 500mg daily as outpatient- can hold while on broad spectrum antibiotics   - pt to f/u with outpatient hematologist Dr. Mcmahon after discharge- notify Heme/onc prior to discharge  -appreciate heme onc recs

## 2024-12-24 LAB
ALBUMIN SERPL ELPH-MCNC: 3.6 G/DL — SIGNIFICANT CHANGE UP (ref 3.3–5)
ALP SERPL-CCNC: 99 U/L — SIGNIFICANT CHANGE UP (ref 40–120)
ALT FLD-CCNC: 19 U/L — SIGNIFICANT CHANGE UP (ref 10–45)
ANION GAP SERPL CALC-SCNC: 13 MMOL/L — SIGNIFICANT CHANGE UP (ref 5–17)
AST SERPL-CCNC: 21 U/L — SIGNIFICANT CHANGE UP (ref 10–40)
BASOPHILS # BLD AUTO: 0.06 K/UL — SIGNIFICANT CHANGE UP (ref 0–0.2)
BASOPHILS NFR BLD AUTO: 0.8 % — SIGNIFICANT CHANGE UP (ref 0–2)
BILIRUB SERPL-MCNC: 0.2 MG/DL — SIGNIFICANT CHANGE UP (ref 0.2–1.2)
BUN SERPL-MCNC: 33 MG/DL — HIGH (ref 7–23)
CALCIUM SERPL-MCNC: 8.6 MG/DL — SIGNIFICANT CHANGE UP (ref 8.4–10.5)
CHLORIDE SERPL-SCNC: 101 MMOL/L — SIGNIFICANT CHANGE UP (ref 96–108)
CO2 SERPL-SCNC: 20 MMOL/L — LOW (ref 22–31)
CREAT SERPL-MCNC: 2.21 MG/DL — HIGH (ref 0.5–1.3)
EGFR: 29 ML/MIN/1.73M2 — LOW
EOSINOPHIL # BLD AUTO: 0.24 K/UL — SIGNIFICANT CHANGE UP (ref 0–0.5)
EOSINOPHIL NFR BLD AUTO: 3.4 % — SIGNIFICANT CHANGE UP (ref 0–6)
GLUCOSE SERPL-MCNC: 70 MG/DL — SIGNIFICANT CHANGE UP (ref 70–99)
HCT VFR BLD CALC: 30.5 % — LOW (ref 39–50)
HGB BLD-MCNC: 10.2 G/DL — LOW (ref 13–17)
IMM GRANULOCYTES NFR BLD AUTO: 4.2 % — HIGH (ref 0–0.9)
LYMPHOCYTES # BLD AUTO: 0.92 K/UL — LOW (ref 1–3.3)
LYMPHOCYTES # BLD AUTO: 13 % — SIGNIFICANT CHANGE UP (ref 13–44)
MAGNESIUM SERPL-MCNC: 2.4 MG/DL — SIGNIFICANT CHANGE UP (ref 1.6–2.6)
MCHC RBC-ENTMCNC: 33.4 G/DL — SIGNIFICANT CHANGE UP (ref 32–36)
MCHC RBC-ENTMCNC: 36.8 PG — HIGH (ref 27–34)
MCV RBC AUTO: 110.1 FL — HIGH (ref 80–100)
MONOCYTES # BLD AUTO: 0.29 K/UL — SIGNIFICANT CHANGE UP (ref 0–0.9)
MONOCYTES NFR BLD AUTO: 4.1 % — SIGNIFICANT CHANGE UP (ref 2–14)
NEUTROPHILS # BLD AUTO: 5.28 K/UL — SIGNIFICANT CHANGE UP (ref 1.8–7.4)
NEUTROPHILS NFR BLD AUTO: 74.5 % — SIGNIFICANT CHANGE UP (ref 43–77)
NRBC # BLD: 0 /100 WBCS — SIGNIFICANT CHANGE UP (ref 0–0)
PHOSPHATE SERPL-MCNC: 4.5 MG/DL — SIGNIFICANT CHANGE UP (ref 2.5–4.5)
PLATELET # BLD AUTO: 177 K/UL — SIGNIFICANT CHANGE UP (ref 150–400)
POTASSIUM SERPL-MCNC: 4.7 MMOL/L — SIGNIFICANT CHANGE UP (ref 3.5–5.3)
POTASSIUM SERPL-SCNC: 4.7 MMOL/L — SIGNIFICANT CHANGE UP (ref 3.5–5.3)
PROT SERPL-MCNC: 6.7 G/DL — SIGNIFICANT CHANGE UP (ref 6–8.3)
RBC # BLD: 2.77 M/UL — LOW (ref 4.2–5.8)
RBC # FLD: 17.7 % — HIGH (ref 10.3–14.5)
SODIUM SERPL-SCNC: 134 MMOL/L — LOW (ref 135–145)
WBC # BLD: 7.09 K/UL — SIGNIFICANT CHANGE UP (ref 3.8–10.5)
WBC # FLD AUTO: 7.09 K/UL — SIGNIFICANT CHANGE UP (ref 3.8–10.5)

## 2024-12-24 PROCEDURE — 99232 SBSQ HOSP IP/OBS MODERATE 35: CPT | Mod: GC

## 2024-12-24 PROCEDURE — 99232 SBSQ HOSP IP/OBS MODERATE 35: CPT

## 2024-12-24 RX ORDER — MINERAL OIL
133 OIL (ML) MISCELLANEOUS ONCE
Refills: 0 | Status: COMPLETED | OUTPATIENT
Start: 2024-12-24 | End: 2024-12-24

## 2024-12-24 RX ADMIN — GABAPENTIN 300 MILLIGRAM(S): 300 CAPSULE ORAL at 17:26

## 2024-12-24 RX ADMIN — HEPARIN SODIUM 5000 UNIT(S): 1000 INJECTION, SOLUTION INTRAVENOUS; SUBCUTANEOUS at 13:33

## 2024-12-24 RX ADMIN — HEPARIN SODIUM 5000 UNIT(S): 1000 INJECTION, SOLUTION INTRAVENOUS; SUBCUTANEOUS at 22:34

## 2024-12-24 RX ADMIN — SENNOSIDES 2 TABLET(S): 8.6 TABLET, FILM COATED ORAL at 22:34

## 2024-12-24 RX ADMIN — PANTOPRAZOLE 40 MILLIGRAM(S): 40 TABLET, DELAYED RELEASE ORAL at 05:20

## 2024-12-24 RX ADMIN — FLUCONAZOLE 200 MILLIGRAM(S): 200 TABLET ORAL at 11:22

## 2024-12-24 RX ADMIN — ALLOPURINOL 200 MILLIGRAM(S): 100 TABLET ORAL at 11:23

## 2024-12-24 RX ADMIN — Medication 17 GRAM(S): at 05:26

## 2024-12-24 RX ADMIN — GABAPENTIN 300 MILLIGRAM(S): 300 CAPSULE ORAL at 05:23

## 2024-12-24 RX ADMIN — Medication 133 MILLILITER(S): at 14:17

## 2024-12-24 RX ADMIN — Medication 1 TABLET(S): at 22:33

## 2024-12-24 RX ADMIN — ROSUVASTATIN 10 MILLIGRAM(S): 40 TABLET, FILM COATED ORAL at 22:33

## 2024-12-24 RX ADMIN — SERTRALINE HYDROCHLORIDE 25 MILLIGRAM(S): 25 TABLET ORAL at 11:22

## 2024-12-24 RX ADMIN — Medication 400 MILLIGRAM(S): at 17:27

## 2024-12-24 RX ADMIN — Medication 1 TABLET(S): at 05:19

## 2024-12-24 RX ADMIN — MINOCYCLINE HYDROCHLORIDE 200 MILLIGRAM(S): 100 CAPSULE ORAL at 17:27

## 2024-12-24 RX ADMIN — Medication 1 APPLICATION(S): at 11:33

## 2024-12-24 RX ADMIN — HEPARIN SODIUM 5000 UNIT(S): 1000 INJECTION, SOLUTION INTRAVENOUS; SUBCUTANEOUS at 05:23

## 2024-12-24 RX ADMIN — MINOCYCLINE HYDROCHLORIDE 200 MILLIGRAM(S): 100 CAPSULE ORAL at 05:19

## 2024-12-24 RX ADMIN — CLOPIDOGREL BISULFATE 75 MILLIGRAM(S): 75 TABLET, FILM COATED ORAL at 11:22

## 2024-12-24 RX ADMIN — Medication 81 MILLIGRAM(S): at 11:22

## 2024-12-24 RX ADMIN — Medication 1 TABLET(S): at 13:34

## 2024-12-24 RX ADMIN — CHLORHEXIDINE GLUCONATE 1 APPLICATION(S): 1.2 RINSE ORAL at 11:29

## 2024-12-24 RX ADMIN — Medication 400 MILLIGRAM(S): at 05:20

## 2024-12-24 NOTE — PROGRESS NOTE ADULT - PROBLEM SELECTOR PLAN 1
Pt had two separate blood cultures that were positive Stenotrophomonas maltophilia. He previously was tested positive for this in June 2024 and was also positive for MRSE. He was treated with vancomycin--> zosyn --> minocyline --> bactrim and was seen by ID at the time for treatment plan.  Likely 2/2 PICC line which has now been removed    Plan  - c/w bactrim DS TID and minocycline 200 bid per ID   - f/u ID recs  - TTE: no vegetations, pending possible JEAN  - consult cardiology for JEAN  - repeat BCx x2 q72h until clearance (last sent 12/23)

## 2024-12-24 NOTE — PROGRESS NOTE ADULT - ASSESSMENT
Mr. Moya is a 84-year-old Male with a history of myelodysplastic syndrome on chemo (last session 12/18), CAD, CKD, HTN, HLD, who presents with positive blood cultures for Stenotrophomonas maltophilia on 12/18/24, pending bcx clearance and possible JEAN.

## 2024-12-24 NOTE — PROGRESS NOTE ADULT - PROBLEM SELECTOR PLAN 4
- hold dacogen while in hospital  - c/w antifungal and antiviral prophylaxis (pt on fluconazole 200mg daily and acyclovir 400mg BID as outpatient); pt also normally on levofloxacin 500mg daily as outpatient- can hold while on broad spectrum antibiotics   - pt to f/u with outpatient hematologist Dr. Mcmahon after discharge- notify Heme/onc prior to discharge  -appreciate heme onc recs

## 2024-12-24 NOTE — PROGRESS NOTE ADULT - SUBJECTIVE AND OBJECTIVE BOX
Follow Up:    Stenotrophomonas bacteremia    Interval History/ROS:  Tmax 99.9. Patient was seen and examined at bedside. No fever. No diarrhea.     Allergies  No Known Allergies        ANTIMICROBIALS:  acyclovir   Oral Tab/Cap 400 two times a day  fluconAZOLE   Tablet 200 daily  minocycline 200 two times a day  trimethoprim  160 mG/sulfamethoxazole 800 mG 1 every 8 hours      OTHER MEDS:  MEDICATIONS  (STANDING):  allopurinol 200 daily  aspirin  chewable 81 daily  clopidogrel Tablet 75 daily  gabapentin 300 two times a day  heparin   Injectable 5000 every 8 hours  influenza  Vaccine (HIGH DOSE) 0.5 once  mineral oil enema 133 once  pantoprazole    Tablet 40 before breakfast  polyethylene glycol 3350 17 two times a day PRN  rosuvastatin 10 at bedtime  senna 2 at bedtime  sertraline 25 daily      Vital Signs Last 24 Hrs  T(C): 36.5 (24 Dec 2024 11:52), Max: 37.7 (24 Dec 2024 04:00)  T(F): 97.7 (24 Dec 2024 11:52), Max: 99.9 (24 Dec 2024 04:00)  HR: 82 (24 Dec 2024 11:52) (77 - 100)  BP: 126/66 (24 Dec 2024 11:52) (126/66 - 149/86)  BP(mean): --  RR: 18 (24 Dec 2024 11:52) (16 - 18)  SpO2: 94% (24 Dec 2024 11:52) (92% - 97%)    Parameters below as of 24 Dec 2024 11:52  Patient On (Oxygen Delivery Method): room air      PHYSICAL EXAM:  Constitutional: non-toxic, no distress  ENT:  supple  Cardiovascular:   normal S1, S2, RRR; +systolic murmur  Respiratory:  clear BS bilaterally, no wheezes, no rales  Musculoskeletal:  no BUE/BLE edema; L heel DTI w/o purulence  Neurologic: awake and alert  Skin:  RUE PICC s/p removal, no tenderness or purulence                              10.2   7.09  )-----------( 177      ( 24 Dec 2024 06:57 )             30.5       12-24    134[L]  |  101  |  33[H]  ----------------------------<  70  4.7   |  20[L]  |  2.21[H]    Ca    8.6      24 Dec 2024 06:55  Phos  4.5     12-24  Mg     2.4     12-24    TPro  6.7  /  Alb  3.6  /  TBili  0.2  /  DBili  x   /  AST  21  /  ALT  19  /  AlkPhos  99  12-24      Urinalysis Basic - ( 24 Dec 2024 06:55 )    Color: x / Appearance: x / SG: x / pH: x  Gluc: 70 mg/dL / Ketone: x  / Bili: x / Urobili: x   Blood: x / Protein: x / Nitrite: x   Leuk Esterase: x / RBC: x / WBC x   Sq Epi: x / Non Sq Epi: x / Bacteria: x        MICROBIOLOGY:  v  .Blood BLOOD  12-23-24   No growth at 24 hours  --  --      .Surgical Swab  12-20-24   No growth  --  --      Clean Catch Clean Catch (Midstream)  12-19-24   >100,000 CFU/ml Staphylococcus epidermidis "Susceptibilities not  performed"  --  --      .Blood BLOOD  12-19-24   Growth in aerobic bottle: Stenotrophomonas maltophilia  --  Stenotrophomonas maltophilia      .Blood BLOOD  12-19-24   Growth in aerobic bottle: Stenotrophomonas maltophilia  See previous culture [id number]  --    Growth in aerobic bottle: Gram Negative Rods      .Blood BLOOD  12-18-24   Growth in aerobic bottle: Stenotrophomonas maltophilia  Direct identification is available within approximately 3-5  hours either by Blood Panel Multiplexed PCR or Direct  MALDI-TOF. Details: [email or web address]  --  Blood Culture PCR  Stenotrophomonas maltophilia      .Blood BLOOD  12-18-24   Growth in aerobic bottle: Stenotrophomonas maltophilia  See previous culture [id number]  --    Growth in aerobic bottle: Gram Negative Rods        TTE:  < from: TTE Limited W or WO Ultrasound Enhancing Agent (12.23.24 @ 09:44) >    TRANSTHORACIC ECHOCARDIOGRAM REPORT  ________________________________________________________________________________                                      _______       Pt. Name:       MASOOD MACARIO Study Date:    12/23/2024  MRN:            [id number]        YOB: 1940  Accession #:    [id number]         Age:           84 years  Account#:       [id number]      Gender:        M  Heart Rate:                       Height:        65.00 in (165.10 cm)  Rhythm:      Weight:        183.00 lb (83.01 kg)  Blood Pressure: 140/89 mmHg       BSA/BMI:       1.90 m² / 30.45 kg/m²  ________________________________________________________________________________________  Referring Physician:    [id number] Rosalino Correa  Interpreting Physician: Vj Cleevland M.D.  Primary Sonographer:    Kaur Alejandra RDCS    CPT:              DOPPL ECHO COLOR FLOW - 86450.m;ECHO TTE W/O CON F/U LTD -                    68275.m  Indication(s):    Bacteremia - R78.81  Procedure:    Limited transthoracic echocardiogram. Color Doppler                    performed.  Ordering          5MON  Location:  Admission Status: Inpatient  Study             Image quality for this study is fair.  Information:    _______________________________________________________________________________________     CONCLUSIONS:      1. No echocardiographic evidence of vegetations.   2. Moderate mitral regurgitation.   3. Mild aortic regurgitation.   4. Trace pulmonic regurgitation.   5. Trace tricuspid regurgitation.   6. Trileaflet aortic valve with reduced systolic excursion. There is calcification of the aortic valve leaflets.   7. Compared to the transthoracic echocardiogram performed on 6/11/2024, there have been no significant interval changes.    ________________________________________________________________________________________  FINDINGS:     Aortic Valve:  The aortic valve appears trileaflet with reduced systolic excursion. There is calcification of the aortic valve leaflets.There is mild aortic regurgitation.     Mitral Valve:  There is mild calcification of the mitral valve annulus. There is mild leaflet calcification. There is moderate mitral regurgitation.     Tricuspid Valve:  There is trace tricuspid regurgitation.     Pulmonic Valve:  The pulmonic valve was not well visualized. Structurally normal pulmonic valve with normal leaflet excursion. There is trace pulmonic regurgitation.  ________________________________________________________________________________________  Electronically signed on 12/23/2024 at 8:48:30 PM by Vj Cleveland M.D.         *** Final ***    < end of copied text >            RADIOLOGY:  Imaging below independently reviewed.  < from: Xray Chest 2 Views PA/Lat (12.19.24 @ 11:19) >    ACC: [id number] EXAM:  XR CHEST PA LAT 2V   ORDERED BY: ADE CM     PROCEDURE DATE:  12/19/2024          INTERPRETATION:  EXAMINATION: XR CHEST PA AND LATERAL    CLINICAL INDICATION: Sepsis    TECHNIQUE: 2 views; Frontal and lateral views of the chest were obtained.    COMPARISON: Chest x-ray 6/17/2024.    FINDINGS:  Right upper extremity PICC terminates in the SVC. Surgical clips in the   neck are noted.  The heart is normal in size.  The lungs are clear.  There is no pneumothorax or pleural effusion.  No acute osseous abnormalities. Mild bilateral glenohumeral joint   arthrosis. Degenerative changes of the thoracic spine.    IMPRESSION:  Clear lungs.    --- End of Report ---          BELKIS ANGELES DO; Resident Radiologist  This document has been electronically signed.  NAHID KOVACS MD; Attending Radiologist  This document has been electronically signed. Dec 19 2024 11:29AM    < end of copied text >

## 2024-12-24 NOTE — PROGRESS NOTE ADULT - SUBJECTIVE AND OBJECTIVE BOX
SUBJECTIVE / OVERNIGHT EVENTS:  Pt seen and examined at bedside. SILVANO.    MEDICATIONS  (STANDING):  acyclovir   Oral Tab/Cap 400 milliGRAM(s) Oral two times a day  allopurinol 200 milliGRAM(s) Oral daily  aspirin  chewable 81 milliGRAM(s) Oral daily  cadexomer iodine 0.9% Gel 1 Application(s) Topical daily  chlorhexidine 4% Liquid 1 Application(s) Topical daily  clopidogrel Tablet 75 milliGRAM(s) Oral daily  fluconAZOLE   Tablet 200 milliGRAM(s) Oral daily  gabapentin 300 milliGRAM(s) Oral two times a day  heparin   Injectable 5000 Unit(s) SubCutaneous every 8 hours  influenza  Vaccine (HIGH DOSE) 0.5 milliLiter(s) IntraMuscular once  minocycline 200 milliGRAM(s) Oral two times a day  pantoprazole    Tablet 40 milliGRAM(s) Oral before breakfast  rosuvastatin 10 milliGRAM(s) Oral at bedtime  senna 2 Tablet(s) Oral at bedtime  sertraline 25 milliGRAM(s) Oral daily  trimethoprim  160 mG/sulfamethoxazole 800 mG 1 Tablet(s) Oral every 8 hours    MEDICATIONS  (PRN):  polyethylene glycol 3350 17 Gram(s) Oral two times a day PRN Constipation        12-23-24 @ 07:01  -  12-24-24 @ 07:00  --------------------------------------------------------  IN: 300 mL / OUT: 0 mL / NET: 300 mL        PHYSICAL EXAM:  Vital Signs Last 24 Hrs  T(C): 37.7 (24 Dec 2024 04:00), Max: 37.7 (24 Dec 2024 04:00)  T(F): 99.9 (24 Dec 2024 04:00), Max: 99.9 (24 Dec 2024 04:00)  HR: 77 (24 Dec 2024 04:00) (77 - 100)  BP: 139/50 (24 Dec 2024 04:00) (139/50 - 149/86)  BP(mean): --  RR: 18 (24 Dec 2024 04:00) (16 - 18)  SpO2: 96% (24 Dec 2024 04:00) (92% - 97%)    Parameters below as of 24 Dec 2024 04:00  Patient On (Oxygen Delivery Method): room air        CAPILLARY BLOOD GLUCOSE        I&O's Summary    23 Dec 2024 07:01  -  24 Dec 2024 07:00  --------------------------------------------------------  IN: 300 mL / OUT: 0 mL / NET: 300 mL    GENERAL: NAD, lying comfortably in bed  HEENT: NCAT, EOMI, conjunctiva/sclera clear  HEART: RRR; no murmurs, rubs, gallops.   RESPIRATORY: CTA B/L, no W/R/R  ABDOMEN: Soft, Nontender, Nondistended, last BM 12/20 per pt  NEUROLOGY: grossly A&O, nonfocal, moving all extremities  EXTREMITIES: +lesions b/l LE, unchanged from prior exam. 2+ Peripheral Pulses, No clubbing, cyanosis, or edema  SKIN: warm, dry, normal color, no rash or abnormal lesions     LABS:                        10.2   7.09  )-----------( 177      ( 24 Dec 2024 06:57 )             30.5     12-23    135  |  103  |  32[H]  ----------------------------<  89  4.4   |  19[L]  |  2.31[H]    Ca    8.8      23 Dec 2024 07:16  Phos  4.0     12-23  Mg     2.3     12-23    TPro  6.6  /  Alb  3.6  /  TBili  0.2  /  DBili  x   /  AST  22  /  ALT  15  /  AlkPhos  90  12-23          Urinalysis Basic - ( 23 Dec 2024 07:16 )    Color: x / Appearance: x / SG: x / pH: x  Gluc: 89 mg/dL / Ketone: x  / Bili: x / Urobili: x   Blood: x / Protein: x / Nitrite: x   Leuk Esterase: x / RBC: x / WBC x   Sq Epi: x / Non Sq Epi: x / Bacteria: x          IMAGING:    [X] All pertinent imaging reviewed by me

## 2024-12-24 NOTE — PROGRESS NOTE ADULT - PROBLEM SELECTOR PLAN 10
DVT: Heparin subQ  Code Status: Full Code  Diet: DASH diet w/ renal restrictions  Dispo: PT recommending NERI

## 2024-12-24 NOTE — PROGRESS NOTE ADULT - ASSESSMENT
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    CXR neg. Likely PICC as a source. Patient has a systolic murmur w/ recurrence of Stenotrophomonas bacteremia. Would recommend IE workup. PICC line removed 12/20. Heel DTI management per podiatry, not considered a source at this time.    TTE not revealing a vegetation. However, given that this is a second admission with Stenotrophomonas bacteremia, would recommend JEAN.    Cultures:  12/18 BCx Stenotrophomonas maltophilia (2 out of 4)  12/19 BCx Stenotrophomonas maltophilia (2 out of 4)  12/19 UCx Staph epi  12/20 PICC tip culture neg  12/23 BCx neg      Antimicrobials:  TMP/SMX 12/19-  Minocycline 12/20-  Fluconazole ppx  Acyclovir ppx      #Stenotrophomonas bacteremia  #MDS on chemo  #Recurrent bacteremia  #Systolic murmurs    Recommendations:  - JEAN  - Decrease Bactrim to DS 1 tab Q8H. Discussed with ID pharmacist re: dosing  - Continue with minocycline 200 mg PO Q12H. To re-evaluate upon JEAN.    Plan discussed with primary team attending Dr. Gray and primary team house staff.  Thank you for this consult. Inpatient ID team will follow.    Bert Jordan MD, PhD  Attending Physician  Division of Infectious Diseases  Department of Medicine    Please contact through MS Teams message.  Office: [phone] (after 5 PM or weekend)

## 2024-12-25 LAB
ALBUMIN SERPL ELPH-MCNC: 3.7 G/DL — SIGNIFICANT CHANGE UP (ref 3.3–5)
ALP SERPL-CCNC: 113 U/L — SIGNIFICANT CHANGE UP (ref 40–120)
ALT FLD-CCNC: 20 U/L — SIGNIFICANT CHANGE UP (ref 10–45)
ANION GAP SERPL CALC-SCNC: 15 MMOL/L — SIGNIFICANT CHANGE UP (ref 5–17)
AST SERPL-CCNC: 22 U/L — SIGNIFICANT CHANGE UP (ref 10–40)
BASOPHILS # BLD AUTO: 0.08 K/UL — SIGNIFICANT CHANGE UP (ref 0–0.2)
BASOPHILS NFR BLD AUTO: 1.1 % — SIGNIFICANT CHANGE UP (ref 0–2)
BILIRUB SERPL-MCNC: 0.3 MG/DL — SIGNIFICANT CHANGE UP (ref 0.2–1.2)
BUN SERPL-MCNC: 35 MG/DL — HIGH (ref 7–23)
CALCIUM SERPL-MCNC: 8.7 MG/DL — SIGNIFICANT CHANGE UP (ref 8.4–10.5)
CHLORIDE SERPL-SCNC: 101 MMOL/L — SIGNIFICANT CHANGE UP (ref 96–108)
CO2 SERPL-SCNC: 18 MMOL/L — LOW (ref 22–31)
CREAT SERPL-MCNC: 2.22 MG/DL — HIGH (ref 0.5–1.3)
EGFR: 29 ML/MIN/1.73M2 — LOW
EOSINOPHIL # BLD AUTO: 0.27 K/UL — SIGNIFICANT CHANGE UP (ref 0–0.5)
EOSINOPHIL NFR BLD AUTO: 3.8 % — SIGNIFICANT CHANGE UP (ref 0–6)
GLUCOSE SERPL-MCNC: 78 MG/DL — SIGNIFICANT CHANGE UP (ref 70–99)
HCT VFR BLD CALC: 32.3 % — LOW (ref 39–50)
HGB BLD-MCNC: 10.7 G/DL — LOW (ref 13–17)
IMM GRANULOCYTES NFR BLD AUTO: 3.1 % — HIGH (ref 0–0.9)
LYMPHOCYTES # BLD AUTO: 0.98 K/UL — LOW (ref 1–3.3)
LYMPHOCYTES # BLD AUTO: 13.6 % — SIGNIFICANT CHANGE UP (ref 13–44)
MAGNESIUM SERPL-MCNC: 2.4 MG/DL — SIGNIFICANT CHANGE UP (ref 1.6–2.6)
MCHC RBC-ENTMCNC: 33.1 G/DL — SIGNIFICANT CHANGE UP (ref 32–36)
MCHC RBC-ENTMCNC: 36 PG — HIGH (ref 27–34)
MCV RBC AUTO: 108.8 FL — HIGH (ref 80–100)
MONOCYTES # BLD AUTO: 0.38 K/UL — SIGNIFICANT CHANGE UP (ref 0–0.9)
MONOCYTES NFR BLD AUTO: 5.3 % — SIGNIFICANT CHANGE UP (ref 2–14)
NEUTROPHILS # BLD AUTO: 5.26 K/UL — SIGNIFICANT CHANGE UP (ref 1.8–7.4)
NEUTROPHILS NFR BLD AUTO: 73.1 % — SIGNIFICANT CHANGE UP (ref 43–77)
NRBC # BLD: 0 /100 WBCS — SIGNIFICANT CHANGE UP (ref 0–0)
PHOSPHATE SERPL-MCNC: 4.6 MG/DL — HIGH (ref 2.5–4.5)
PLATELET # BLD AUTO: 170 K/UL — SIGNIFICANT CHANGE UP (ref 150–400)
POTASSIUM SERPL-MCNC: 4.8 MMOL/L — SIGNIFICANT CHANGE UP (ref 3.5–5.3)
POTASSIUM SERPL-SCNC: 4.8 MMOL/L — SIGNIFICANT CHANGE UP (ref 3.5–5.3)
PROT SERPL-MCNC: 6.8 G/DL — SIGNIFICANT CHANGE UP (ref 6–8.3)
RBC # BLD: 2.97 M/UL — LOW (ref 4.2–5.8)
RBC # FLD: 17.9 % — HIGH (ref 10.3–14.5)
SODIUM SERPL-SCNC: 134 MMOL/L — LOW (ref 135–145)
WBC # BLD: 7.19 K/UL — SIGNIFICANT CHANGE UP (ref 3.8–10.5)
WBC # FLD AUTO: 7.19 K/UL — SIGNIFICANT CHANGE UP (ref 3.8–10.5)

## 2024-12-25 PROCEDURE — 99232 SBSQ HOSP IP/OBS MODERATE 35: CPT | Mod: GC

## 2024-12-25 RX ORDER — POLYETHYLENE GLYCOL 3350 17 G/DOSE
17 POWDER (GRAM) ORAL
Refills: 0 | Status: DISCONTINUED | OUTPATIENT
Start: 2024-12-25 | End: 2024-12-27

## 2024-12-25 RX ADMIN — GABAPENTIN 300 MILLIGRAM(S): 300 CAPSULE ORAL at 17:04

## 2024-12-25 RX ADMIN — HEPARIN SODIUM 5000 UNIT(S): 1000 INJECTION, SOLUTION INTRAVENOUS; SUBCUTANEOUS at 06:36

## 2024-12-25 RX ADMIN — PANTOPRAZOLE 40 MILLIGRAM(S): 40 TABLET, DELAYED RELEASE ORAL at 06:37

## 2024-12-25 RX ADMIN — HEPARIN SODIUM 5000 UNIT(S): 1000 INJECTION, SOLUTION INTRAVENOUS; SUBCUTANEOUS at 14:10

## 2024-12-25 RX ADMIN — MINOCYCLINE HYDROCHLORIDE 200 MILLIGRAM(S): 100 CAPSULE ORAL at 06:36

## 2024-12-25 RX ADMIN — Medication 1 TABLET(S): at 21:12

## 2024-12-25 RX ADMIN — Medication 1 APPLICATION(S): at 11:17

## 2024-12-25 RX ADMIN — GABAPENTIN 300 MILLIGRAM(S): 300 CAPSULE ORAL at 06:36

## 2024-12-25 RX ADMIN — ROSUVASTATIN 10 MILLIGRAM(S): 40 TABLET, FILM COATED ORAL at 21:12

## 2024-12-25 RX ADMIN — SENNOSIDES 2 TABLET(S): 8.6 TABLET, FILM COATED ORAL at 21:12

## 2024-12-25 RX ADMIN — HEPARIN SODIUM 5000 UNIT(S): 1000 INJECTION, SOLUTION INTRAVENOUS; SUBCUTANEOUS at 21:12

## 2024-12-25 RX ADMIN — Medication 1 TABLET(S): at 06:37

## 2024-12-25 RX ADMIN — ALLOPURINOL 200 MILLIGRAM(S): 100 TABLET ORAL at 11:15

## 2024-12-25 RX ADMIN — Medication 1 TABLET(S): at 14:10

## 2024-12-25 RX ADMIN — SERTRALINE HYDROCHLORIDE 25 MILLIGRAM(S): 25 TABLET ORAL at 11:14

## 2024-12-25 RX ADMIN — Medication 81 MILLIGRAM(S): at 11:14

## 2024-12-25 RX ADMIN — Medication 400 MILLIGRAM(S): at 06:35

## 2024-12-25 RX ADMIN — MINOCYCLINE HYDROCHLORIDE 200 MILLIGRAM(S): 100 CAPSULE ORAL at 17:04

## 2024-12-25 RX ADMIN — CLOPIDOGREL BISULFATE 75 MILLIGRAM(S): 75 TABLET, FILM COATED ORAL at 11:15

## 2024-12-25 RX ADMIN — CHLORHEXIDINE GLUCONATE 1 APPLICATION(S): 1.2 RINSE ORAL at 11:14

## 2024-12-25 RX ADMIN — Medication 400 MILLIGRAM(S): at 17:03

## 2024-12-25 RX ADMIN — FLUCONAZOLE 200 MILLIGRAM(S): 200 TABLET ORAL at 11:15

## 2024-12-25 NOTE — PROGRESS NOTE ADULT - PROBLEM SELECTOR PLAN 10
DVT: Heparin subQ  Code Status: Full Code  Diet: DASH diet w/ renal restrictions; NPO at midnight  Dispo: PT recommending NERI

## 2024-12-25 NOTE — PROGRESS NOTE ADULT - ASSESSMENT
Mr. Moya is a 84-year-old Male with a history of myelodysplastic syndrome on chemo (last session 12/18), CAD, CKD, HTN, HLD, who presents with positive blood cultures for Stenotrophomonas maltophilia on 12/18/24, pending bcx clearance possible JEAN on Thursday 12/26.

## 2024-12-25 NOTE — PROGRESS NOTE ADULT - SUBJECTIVE AND OBJECTIVE BOX
SUBJECTIVE / OVERNIGHT EVENTS:  Pt seen and examined at bedside. SILVANO.    MEDICATIONS  (STANDING):  acyclovir   Oral Tab/Cap 400 milliGRAM(s) Oral two times a day  allopurinol 200 milliGRAM(s) Oral daily  aspirin  chewable 81 milliGRAM(s) Oral daily  cadexomer iodine 0.9% Gel 1 Application(s) Topical daily  chlorhexidine 4% Liquid 1 Application(s) Topical daily  clopidogrel Tablet 75 milliGRAM(s) Oral daily  fluconAZOLE   Tablet 200 milliGRAM(s) Oral daily  gabapentin 300 milliGRAM(s) Oral two times a day  heparin   Injectable 5000 Unit(s) SubCutaneous every 8 hours  influenza  Vaccine (HIGH DOSE) 0.5 milliLiter(s) IntraMuscular once  minocycline 200 milliGRAM(s) Oral two times a day  pantoprazole    Tablet 40 milliGRAM(s) Oral before breakfast  rosuvastatin 10 milliGRAM(s) Oral at bedtime  senna 2 Tablet(s) Oral at bedtime  sertraline 25 milliGRAM(s) Oral daily  trimethoprim  160 mG/sulfamethoxazole 800 mG 1 Tablet(s) Oral every 8 hours    MEDICATIONS  (PRN):  polyethylene glycol 3350 17 Gram(s) Oral two times a day PRN Constipation        12-24-24 @ 07:01  -  12-25-24 @ 07:00  --------------------------------------------------------  IN: 960 mL / OUT: 1625 mL / NET: -665 mL        PHYSICAL EXAM:  Vital Signs Last 24 Hrs  T(C): 36.4 (25 Dec 2024 05:12), Max: 36.6 (24 Dec 2024 08:21)  T(F): 97.6 (25 Dec 2024 05:12), Max: 97.9 (24 Dec 2024 21:43)  HR: 94 (25 Dec 2024 05:12) (75 - 98)  BP: 142/86 (25 Dec 2024 05:12) (126/66 - 158/96)  BP(mean): --  RR: 18 (25 Dec 2024 05:12) (17 - 18)  SpO2: 95% (25 Dec 2024 05:12) (94% - 95%)    Parameters below as of 25 Dec 2024 05:12  Patient On (Oxygen Delivery Method): room air        CAPILLARY BLOOD GLUCOSE        I&O's Summary    24 Dec 2024 07:01  -  25 Dec 2024 07:00  --------------------------------------------------------  IN: 960 mL / OUT: 1625 mL / NET: -665 mL    GENERAL: NAD, lying comfortably in bed  HEENT: NCAT, EOMI, conjunctiva/sclera clear  HEART: RRR; no murmurs, rubs, gallops.   RESPIRATORY: CTA B/L, no W/R/R  ABDOMEN: Soft, Nontender, Nondistended, last BM 12/20 per pt  NEUROLOGY: grossly A&O, nonfocal, moving all extremities  EXTREMITIES: +lesions b/l LE, unchanged from prior exam. 2+ Peripheral Pulses, No clubbing, cyanosis, or edema  SKIN: warm, dry, normal color, no rash or abnormal lesions     LABS:                        10.7   7.19  )-----------( 170      ( 25 Dec 2024 06:43 )             32.3     12-25    134[L]  |  101  |  35[H]  ----------------------------<  78  4.8   |  18[L]  |  2.22[H]    Ca    8.7      25 Dec 2024 06:41  Phos  4.6     12-25  Mg     2.4     12-25    TPro  6.8  /  Alb  3.7  /  TBili  0.3  /  DBili  x   /  AST  22  /  ALT  20  /  AlkPhos  113  12-25          Urinalysis Basic - ( 25 Dec 2024 06:41 )    Color: x / Appearance: x / SG: x / pH: x  Gluc: 78 mg/dL / Ketone: x  / Bili: x / Urobili: x   Blood: x / Protein: x / Nitrite: x   Leuk Esterase: x / RBC: x / WBC x   Sq Epi: x / Non Sq Epi: x / Bacteria: x        Culture - Blood (collected 23 Dec 2024 06:36)  Source: .Blood BLOOD  Preliminary Report (24 Dec 2024 10:01):    No growth at 24 hours    Culture - Blood (collected 23 Dec 2024 06:36)  Source: .Blood BLOOD  Preliminary Report (24 Dec 2024 10:01):    No growth at 24 hours        IMAGING:    [X] All pertinent imaging reviewed by me

## 2024-12-26 ENCOUNTER — RESULT REVIEW (OUTPATIENT)
Age: 84
End: 2024-12-26

## 2024-12-26 LAB
ALBUMIN SERPL ELPH-MCNC: 3.8 G/DL — SIGNIFICANT CHANGE UP (ref 3.3–5)
ALP SERPL-CCNC: 154 U/L — HIGH (ref 40–120)
ALT FLD-CCNC: 31 U/L — SIGNIFICANT CHANGE UP (ref 10–45)
ANION GAP SERPL CALC-SCNC: 19 MMOL/L — HIGH (ref 5–17)
APTT BLD: 32 SEC — SIGNIFICANT CHANGE UP (ref 24.5–35.6)
AST SERPL-CCNC: 37 U/L — SIGNIFICANT CHANGE UP (ref 10–40)
BASOPHILS # BLD AUTO: 0.07 K/UL — SIGNIFICANT CHANGE UP (ref 0–0.2)
BASOPHILS NFR BLD AUTO: 1 % — SIGNIFICANT CHANGE UP (ref 0–2)
BILIRUB SERPL-MCNC: 0.3 MG/DL — SIGNIFICANT CHANGE UP (ref 0.2–1.2)
BLD GP AB SCN SERPL QL: NEGATIVE — SIGNIFICANT CHANGE UP
BUN SERPL-MCNC: 41 MG/DL — HIGH (ref 7–23)
CALCIUM SERPL-MCNC: 9.1 MG/DL — SIGNIFICANT CHANGE UP (ref 8.4–10.5)
CHLORIDE SERPL-SCNC: 102 MMOL/L — SIGNIFICANT CHANGE UP (ref 96–108)
CO2 SERPL-SCNC: 16 MMOL/L — LOW (ref 22–31)
CREAT SERPL-MCNC: 2.31 MG/DL — HIGH (ref 0.5–1.3)
CULTURE RESULTS: SIGNIFICANT CHANGE UP
EGFR: 27 ML/MIN/1.73M2 — LOW
EOSINOPHIL # BLD AUTO: 0.33 K/UL — SIGNIFICANT CHANGE UP (ref 0–0.5)
EOSINOPHIL NFR BLD AUTO: 4.5 % — SIGNIFICANT CHANGE UP (ref 0–6)
GLUCOSE BLDC GLUCOMTR-MCNC: 97 MG/DL — SIGNIFICANT CHANGE UP (ref 70–99)
GLUCOSE SERPL-MCNC: 83 MG/DL — SIGNIFICANT CHANGE UP (ref 70–99)
HCT VFR BLD CALC: 32.2 % — LOW (ref 39–50)
HGB BLD-MCNC: 10.7 G/DL — LOW (ref 13–17)
IMM GRANULOCYTES NFR BLD AUTO: 1.9 % — HIGH (ref 0–0.9)
INR BLD: 1.1 RATIO — SIGNIFICANT CHANGE UP (ref 0.85–1.16)
LYMPHOCYTES # BLD AUTO: 1.05 K/UL — SIGNIFICANT CHANGE UP (ref 1–3.3)
LYMPHOCYTES # BLD AUTO: 14.4 % — SIGNIFICANT CHANGE UP (ref 13–44)
MAGNESIUM SERPL-MCNC: 2.4 MG/DL — SIGNIFICANT CHANGE UP (ref 1.6–2.6)
MCHC RBC-ENTMCNC: 33.2 G/DL — SIGNIFICANT CHANGE UP (ref 32–36)
MCHC RBC-ENTMCNC: 35.9 PG — HIGH (ref 27–34)
MCV RBC AUTO: 108.1 FL — HIGH (ref 80–100)
MONOCYTES # BLD AUTO: 0.29 K/UL — SIGNIFICANT CHANGE UP (ref 0–0.9)
MONOCYTES NFR BLD AUTO: 4 % — SIGNIFICANT CHANGE UP (ref 2–14)
NEUTROPHILS # BLD AUTO: 5.42 K/UL — SIGNIFICANT CHANGE UP (ref 1.8–7.4)
NEUTROPHILS NFR BLD AUTO: 74.2 % — SIGNIFICANT CHANGE UP (ref 43–77)
NRBC # BLD: 0 /100 WBCS — SIGNIFICANT CHANGE UP (ref 0–0)
PHOSPHATE SERPL-MCNC: 5.2 MG/DL — HIGH (ref 2.5–4.5)
PLATELET # BLD AUTO: 168 K/UL — SIGNIFICANT CHANGE UP (ref 150–400)
POTASSIUM SERPL-MCNC: 5 MMOL/L — SIGNIFICANT CHANGE UP (ref 3.5–5.3)
POTASSIUM SERPL-SCNC: 5 MMOL/L — SIGNIFICANT CHANGE UP (ref 3.5–5.3)
PROT SERPL-MCNC: 6.9 G/DL — SIGNIFICANT CHANGE UP (ref 6–8.3)
PROTHROM AB SERPL-ACNC: 12.6 SEC — SIGNIFICANT CHANGE UP (ref 9.9–13.4)
RBC # BLD: 2.98 M/UL — LOW (ref 4.2–5.8)
RBC # FLD: 17.9 % — HIGH (ref 10.3–14.5)
RH IG SCN BLD-IMP: NEGATIVE — SIGNIFICANT CHANGE UP
SODIUM SERPL-SCNC: 137 MMOL/L — SIGNIFICANT CHANGE UP (ref 135–145)
SPECIMEN SOURCE: SIGNIFICANT CHANGE UP
WBC # BLD: 7.3 K/UL — SIGNIFICANT CHANGE UP (ref 3.8–10.5)
WBC # FLD AUTO: 7.3 K/UL — SIGNIFICANT CHANGE UP (ref 3.8–10.5)

## 2024-12-26 PROCEDURE — 93325 DOPPLER ECHO COLOR FLOW MAPG: CPT | Mod: 26

## 2024-12-26 PROCEDURE — 76377 3D RENDER W/INTRP POSTPROCES: CPT | Mod: 26

## 2024-12-26 PROCEDURE — 71045 X-RAY EXAM CHEST 1 VIEW: CPT | Mod: 26

## 2024-12-26 PROCEDURE — 93312 ECHO TRANSESOPHAGEAL: CPT | Mod: 26

## 2024-12-26 PROCEDURE — 93320 DOPPLER ECHO COMPLETE: CPT | Mod: 26

## 2024-12-26 PROCEDURE — 99232 SBSQ HOSP IP/OBS MODERATE 35: CPT | Mod: GC

## 2024-12-26 PROCEDURE — 99232 SBSQ HOSP IP/OBS MODERATE 35: CPT

## 2024-12-26 RX ORDER — SULFAMETHOXAZOLE/TRIMETHOPRIM 800-160 MG
1 TABLET ORAL
Qty: 24 | Refills: 0
Start: 2024-12-26 | End: 2025-01-02

## 2024-12-26 RX ORDER — LEVOFLOXACIN 250 MG
0 TABLET ORAL
Qty: 0 | Refills: 0 | DISCHARGE

## 2024-12-26 RX ORDER — GABAPENTIN 300 MG/1
1 CAPSULE ORAL
Qty: 0 | Refills: 0 | DISCHARGE
Start: 2024-12-26

## 2024-12-26 RX ORDER — SERTRALINE HYDROCHLORIDE 25 MG/1
1 TABLET ORAL
Qty: 0 | Refills: 0 | DISCHARGE
Start: 2024-12-26

## 2024-12-26 RX ORDER — SEVELAMER CARBONATE 800 MG/1
800 TABLET, FILM COATED ORAL
Refills: 0 | Status: DISCONTINUED | OUTPATIENT
Start: 2024-12-26 | End: 2024-12-27

## 2024-12-26 RX ORDER — SEVELAMER CARBONATE 800 MG/1
1 TABLET, FILM COATED ORAL
Qty: 30 | Refills: 0
Start: 2024-12-26 | End: 2025-01-24

## 2024-12-26 RX ORDER — SODIUM BICARBONATE 84 MG/ML
1 INJECTION, SOLUTION INTRAVENOUS
Qty: 90 | Refills: 0
Start: 2024-12-26 | End: 2025-01-24

## 2024-12-26 RX ORDER — SODIUM BICARBONATE 84 MG/ML
650 INJECTION, SOLUTION INTRAVENOUS THREE TIMES A DAY
Refills: 0 | Status: DISCONTINUED | OUTPATIENT
Start: 2024-12-26 | End: 2024-12-27

## 2024-12-26 RX ADMIN — MINOCYCLINE HYDROCHLORIDE 200 MILLIGRAM(S): 100 CAPSULE ORAL at 05:13

## 2024-12-26 RX ADMIN — FLUCONAZOLE 200 MILLIGRAM(S): 200 TABLET ORAL at 13:26

## 2024-12-26 RX ADMIN — Medication 1 TABLET(S): at 13:26

## 2024-12-26 RX ADMIN — SODIUM BICARBONATE 650 MILLIGRAM(S): 84 INJECTION, SOLUTION INTRAVENOUS at 21:09

## 2024-12-26 RX ADMIN — PANTOPRAZOLE 40 MILLIGRAM(S): 40 TABLET, DELAYED RELEASE ORAL at 05:13

## 2024-12-26 RX ADMIN — Medication 1 TABLET(S): at 05:14

## 2024-12-26 RX ADMIN — SENNOSIDES 2 TABLET(S): 8.6 TABLET, FILM COATED ORAL at 21:09

## 2024-12-26 RX ADMIN — SERTRALINE HYDROCHLORIDE 25 MILLIGRAM(S): 25 TABLET ORAL at 13:26

## 2024-12-26 RX ADMIN — Medication 400 MILLIGRAM(S): at 05:13

## 2024-12-26 RX ADMIN — GABAPENTIN 300 MILLIGRAM(S): 300 CAPSULE ORAL at 17:38

## 2024-12-26 RX ADMIN — HEPARIN SODIUM 5000 UNIT(S): 1000 INJECTION, SOLUTION INTRAVENOUS; SUBCUTANEOUS at 05:13

## 2024-12-26 RX ADMIN — HEPARIN SODIUM 5000 UNIT(S): 1000 INJECTION, SOLUTION INTRAVENOUS; SUBCUTANEOUS at 13:28

## 2024-12-26 RX ADMIN — SODIUM BICARBONATE 650 MILLIGRAM(S): 84 INJECTION, SOLUTION INTRAVENOUS at 13:27

## 2024-12-26 RX ADMIN — CLOPIDOGREL BISULFATE 75 MILLIGRAM(S): 75 TABLET, FILM COATED ORAL at 13:27

## 2024-12-26 RX ADMIN — GABAPENTIN 300 MILLIGRAM(S): 300 CAPSULE ORAL at 05:13

## 2024-12-26 RX ADMIN — Medication 81 MILLIGRAM(S): at 13:26

## 2024-12-26 RX ADMIN — MINOCYCLINE HYDROCHLORIDE 200 MILLIGRAM(S): 100 CAPSULE ORAL at 17:38

## 2024-12-26 RX ADMIN — Medication 400 MILLIGRAM(S): at 17:38

## 2024-12-26 RX ADMIN — Medication 17 GRAM(S): at 17:38

## 2024-12-26 RX ADMIN — ALLOPURINOL 200 MILLIGRAM(S): 100 TABLET ORAL at 13:26

## 2024-12-26 RX ADMIN — SEVELAMER CARBONATE 800 MILLIGRAM(S): 800 TABLET, FILM COATED ORAL at 17:38

## 2024-12-26 RX ADMIN — HEPARIN SODIUM 5000 UNIT(S): 1000 INJECTION, SOLUTION INTRAVENOUS; SUBCUTANEOUS at 21:09

## 2024-12-26 RX ADMIN — ROSUVASTATIN 10 MILLIGRAM(S): 40 TABLET, FILM COATED ORAL at 21:09

## 2024-12-26 RX ADMIN — Medication 1 APPLICATION(S): at 13:27

## 2024-12-26 RX ADMIN — Medication 1 TABLET(S): at 21:09

## 2024-12-26 RX ADMIN — CHLORHEXIDINE GLUCONATE 1 APPLICATION(S): 1.2 RINSE ORAL at 13:28

## 2024-12-26 NOTE — PROGRESS NOTE ADULT - PROBLEM SELECTOR PLAN 1
Pt had two separate blood cultures that were positive Stenotrophomonas maltophilia. He previously was tested positive for this in June 2024 and was also positive for MRSE. He was treated with vancomycin--> zosyn --> minocyline --> bactrim and was seen by ID at the time for treatment plan.  Likely 2/2 PICC line which has now been removed    Plan  - c/w bactrim DS TID and minocycline 200 bid per ID   - f/u ID recs  - TTE: no vegetations, pending JEAN 12/26  - f/u cardiology recs  - repeat BCx x2 q72h until clearance (last sent 12/23)  - Re-place PICC line prior to d/c

## 2024-12-26 NOTE — PRE-ANESTHESIA EVALUATION ADULT - NSANTHADDINFOFT_GEN_ALL_CORE
The patient was seen and evaluated and the anesthetic plan discussed.  The patient conveyed understanding and all questions answered.

## 2024-12-26 NOTE — PRE-ANESTHESIA EVALUATION ADULT - NSANTHAPLANRD_GEN_ALL_CORE
A/P 32-year-old female who is healthy with no complaints. Is here for routine physical.  Is here to  a referral for ophthalmology for yearly eye exam.  Her only complaint is at times she feels her arms tremble but actually they are not moving.   Cleatrice Reasons monitored anesthesia care (MAC)

## 2024-12-26 NOTE — PROGRESS NOTE ADULT - TIME BILLING
- Reviewing, and interpreting labs and testing.  - Independently obtaining a review of systems and performing a physical exam  - Reviewing consultant documentation/recommendations in addition to discussing plan of care with consultants.  - Counselling and educating patient and family regarding interpretation of aforementioned items and plan of care.
- Reviewing, and interpreting labs and testing.  - Independently obtaining a review of systems and performing a physical exam  - Reviewing consultant documentation/recommendations in addition to discussing plan of care with consultants.  - Counselling and educating patient and family regarding interpretation of aforementioned items and plan of care.
The necessity of the time spent during the encounter on this date of service was due to:     - Ordering, reviewing, and interpreting labs, testing, and imaging.  - Independently obtaining a review of systems and performing a physical exam  - Reviewing prior hospitalization and where necessary, outpatient records.  - Counselling and educating patient and family regarding interpretation of aforementioned items and plan of care.
- Reviewing, and interpreting labs and testing.  - Independently obtaining a review of systems and performing a physical exam  - Reviewing consultant documentation/recommendations in addition to discussing plan of care with consultants.  - Counselling and educating patient and family regarding interpretation of aforementioned items and plan of care.
The necessity of the time spent during the encounter on this date of service was due to:     - Ordering, reviewing, and interpreting labs, testing, and imaging.  - Independently obtaining a review of systems and performing a physical exam  - Reviewing prior hospitalization and where necessary, outpatient records.  - Counselling and educating patient and family regarding interpretation of aforementioned items and plan of care.
Time-based billing (NON-critical care).     The necessity of the time spent during the encounter on this date of service was due to:     - Ordering, reviewing, and interpreting labs, testing, and imaging.  - Independently obtaining a review of systems and performing a physical exam  - Reviewing prior hospitalization and where necessary, outpatient records.  - Counselling and educating patient and/or family regarding interpretation of aforementioned items and plan of care.
- Reviewing, and interpreting labs and testing.  - Independently obtaining a review of systems and performing a physical exam  - Reviewing consultant documentation/recommendations in addition to discussing plan of care with consultants.  - Counselling and educating patient and family regarding interpretation of aforementioned items and plan of care.

## 2024-12-26 NOTE — PRE-ANESTHESIA EVALUATION ADULT - NSANTHPMHFT_GEN_ALL_CORE
84-year-old Male with a history of myelodysplastic syndrome on chemo (last session 12/18), CAD, CKD, HTN, HLD, who presents with positive blood cultures for Stenotrophomonas maltophilia on 12/18/24  Today for JEAN

## 2024-12-26 NOTE — ADVANCED PRACTICE NURSE CONSULT - ASSESSMENT
Picc Insertion Note  Patient or patient representative educated about central line associated blood stream infection prevention practices.  Catheter type: 4F,  DL Solo Picc  : Bard  Power injectable: Yes  Lot# [id number]    Informed consent obtained by covering floor team.  Procedure assisted by: NILAM Olvera RN  Time out was preformed, confirming the patient's first and last name, date of birth, MR#, procedure, and correct site prior to start of procedure.    Patient was placed with HOB 30 degrees. Patient placement site was prepped with chlorhexidine solution, then draped using maximum sterile barrier protection. The area was injected with 2 ml of 1% lidocaine. Using the Bard Site Rite 8, the catheter was placed using the Modified Seldinger Technique. Strict adherence to outline aseptic technique including handwashing, glove and gown, utilizing mask and cap, plus draping the patient with a sterile drape was observed, patient wore mask. Upon completion of line placement, the insertion site was covered with a sterile occlusive CHG dressing. Pt tolerated procedure well.    VS: WNL         All materials used for catheter insertion, including the intact guide wires, were accounted for at the end of the procedure.  Number of attempts: 1  Complications/Comments: Unable to advance last 20cm in right brachial, placed in left brachial    Emergency Placement:  No  Site: New   Anatomical Site of insertion: Left Brachial  Catheter size/length:  4F,   46cm  US guided Bard  double lumen Solo power picc placed    Post procedure verification with chest Xray as per orders.

## 2024-12-26 NOTE — PROGRESS NOTE ADULT - SUBJECTIVE AND OBJECTIVE BOX
SUBJECTIVE / OVERNIGHT EVENTS:  Pt seen and examined at bedside. SILVANO.    MEDICATIONS  (STANDING):  acyclovir   Oral Tab/Cap 400 milliGRAM(s) Oral two times a day  allopurinol 200 milliGRAM(s) Oral daily  aspirin  chewable 81 milliGRAM(s) Oral daily  cadexomer iodine 0.9% Gel 1 Application(s) Topical daily  chlorhexidine 4% Liquid 1 Application(s) Topical daily  clopidogrel Tablet 75 milliGRAM(s) Oral daily  fluconAZOLE   Tablet 200 milliGRAM(s) Oral daily  gabapentin 300 milliGRAM(s) Oral two times a day  heparin   Injectable 5000 Unit(s) SubCutaneous every 8 hours  influenza  Vaccine (HIGH DOSE) 0.5 milliLiter(s) IntraMuscular once  minocycline 200 milliGRAM(s) Oral two times a day  pantoprazole    Tablet 40 milliGRAM(s) Oral before breakfast  polyethylene glycol 3350 17 Gram(s) Oral two times a day  rosuvastatin 10 milliGRAM(s) Oral at bedtime  senna 2 Tablet(s) Oral at bedtime  sertraline 25 milliGRAM(s) Oral daily  trimethoprim  160 mG/sulfamethoxazole 800 mG 1 Tablet(s) Oral every 8 hours    MEDICATIONS  (PRN):        12-25-24 @ 07:01  -  12-26-24 @ 07:00  --------------------------------------------------------  IN: 680 mL / OUT: 0 mL / NET: 680 mL        PHYSICAL EXAM:  Vital Signs Last 24 Hrs  T(C): 36.6 (26 Dec 2024 05:05), Max: 36.6 (25 Dec 2024 07:38)  T(F): 97.9 (26 Dec 2024 05:05), Max: 97.9 (25 Dec 2024 20:17)  HR: 96 (26 Dec 2024 05:05) (81 - 106)  BP: 142/83 (26 Dec 2024 05:05) (128/77 - 153/95)  BP(mean): --  RR: 18 (26 Dec 2024 05:05) (15 - 18)  SpO2: 94% (26 Dec 2024 05:05) (94% - 97%)    Parameters below as of 26 Dec 2024 05:05  Patient On (Oxygen Delivery Method): room air        CAPILLARY BLOOD GLUCOSE        I&O's Summary    25 Dec 2024 07:01  -  26 Dec 2024 07:00  --------------------------------------------------------  IN: 680 mL / OUT: 0 mL / NET: 680 mL    GENERAL: NAD, lying comfortably in bed  HEENT: NCAT, EOMI, conjunctiva/sclera clear  HEART: RRR; no murmurs, rubs, gallops.   RESPIRATORY: CTA B/L, no W/R/R  ABDOMEN: Soft, Nontender, Nondistended, last BM 12/20 per pt  NEUROLOGY: grossly A&O, nonfocal, moving all extremities  EXTREMITIES: +lesions b/l LE, unchanged from prior exam. 2+ Peripheral Pulses, No clubbing, cyanosis, or edema  SKIN: warm, dry, normal color, no rash or abnormal lesions     LABS:                        10.7   7.30  )-----------( 168      ( 26 Dec 2024 04:16 )             32.2     12-26    137  |  102  |  41[H]  ----------------------------<  83  5.0   |  16[L]  |  2.31[H]    Ca    9.1      26 Dec 2024 04:16  Phos  5.2     12-26  Mg     2.4     12-26    TPro  6.9  /  Alb  3.8  /  TBili  0.3  /  DBili  x   /  AST  37  /  ALT  31  /  AlkPhos  154[H]  12-26    PT/INR - ( 26 Dec 2024 04:16 )   PT: 12.6 sec;   INR: 1.10 ratio         PTT - ( 26 Dec 2024 04:16 )  PTT:32.0 sec      Urinalysis Basic - ( 26 Dec 2024 04:16 )    Color: x / Appearance: x / SG: x / pH: x  Gluc: 83 mg/dL / Ketone: x  / Bili: x / Urobili: x   Blood: x / Protein: x / Nitrite: x   Leuk Esterase: x / RBC: x / WBC x   Sq Epi: x / Non Sq Epi: x / Bacteria: x          IMAGING:    [X] All pertinent imaging reviewed by me

## 2024-12-26 NOTE — PROGRESS NOTE ADULT - ASSESSMENT
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    CXR neg. Likely PICC as a source. Patient has a systolic murmur w/ recurrence of Stenotrophomonas bacteremia. Would recommend IE workup. PICC line removed 12/20. Heel DTI management per podiatry, not considered a source at this time.    TTE and JEAN w/o vegetation. Would treat for 2 weeks for Stenotrophomonas bacteremia.    Cultures:  12/18 BCx Stenotrophomonas maltophilia (2 out of 4)  12/19 BCx Stenotrophomonas maltophilia (2 out of 4)  12/19 UCx Staph epi  12/20 PICC tip culture neg  12/23 BCx neg      Antimicrobials:  TMP/SMX 12/19-  Minocycline 12/20-  Fluconazole ppx  Acyclovir ppx      #Stenotrophomonas bacteremia  #MDS on chemo  #Recurrent bacteremia  #Systolic murmurs    Recommendations:  - Can place a PICC line.  - Continue with Bactrim to DS 1 tab Q8H.  - D/c minocycline  - End date 2 weeks from PICC line removal (end 1/3/24).      Plan discussed with primary team house staff.  Thank you for this consult. Inpatient ID team will follow.    Bert Jordan MD, PhD  Attending Physician  Division of Infectious Diseases  Department of Medicine    Please contact through MS Teams message.  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    CXR neg. Likely PICC as a source. Patient has a systolic murmur w/ recurrence of Stenotrophomonas bacteremia. Would recommend IE workup. PICC line removed 12/20. Heel DTI management per podiatry, not considered a source at this time.    TTE and JEAN w/o vegetation. Would treat for 2 weeks for Stenotrophomonas bacteremia.    Cultures:  12/18 BCx Stenotrophomonas maltophilia (2 out of 4)  12/19 BCx Stenotrophomonas maltophilia (2 out of 4)  12/19 UCx Staph epi  12/20 PICC tip culture neg  12/23 BCx neg      Antimicrobials:  TMP/SMX 12/19-  Minocycline 12/20-  Fluconazole ppx  Acyclovir ppx      #Stenotrophomonas bacteremia  #MDS on chemo  #Recurrent bacteremia  #Systolic murmurs    Recommendations:  - Can place a PICC line as BCx from 12/23 was already negative x72h and PICC line was removed 12/20. VS and no leukocytosis. No need to wait for BCx result from this AM for this purpose.  - Continue with Bactrim to DS 1 tab Q8H.  - D/c minocycline  - End date 2 weeks from PICC line removal (end 1/3/24).      Plan discussed with primary team house staff.  Thank you for this consult. Inpatient ID team will follow.    Bert Jordan MD, PhD  Attending Physician  Division of Infectious Diseases  Department of Medicine    Please contact through MS Teams message.  Office: [phone] (after 5 PM or weekend)

## 2024-12-26 NOTE — PROGRESS NOTE ADULT - SUBJECTIVE AND OBJECTIVE BOX
Follow Up:    Stenotrophomonas bacteremia    Interval History/ROS:  VSS. Patient was seen and examined at bedside. Denies fever. JEAN w/o vegetation. No fever.     Allergies  No Known Allergies        ANTIMICROBIALS:  acyclovir   Oral Tab/Cap 400 two times a day  fluconAZOLE   Tablet 200 daily  minocycline 200 two times a day  trimethoprim  160 mG/sulfamethoxazole 800 mG 1 every 8 hours      OTHER MEDS:  MEDICATIONS  (STANDING):  allopurinol 200 daily  aspirin  chewable 81 daily  clopidogrel Tablet 75 daily  gabapentin 300 two times a day  heparin   Injectable 5000 every 8 hours  influenza  Vaccine (HIGH DOSE) 0.5 once  pantoprazole    Tablet 40 before breakfast  polyethylene glycol 3350 17 two times a day  rosuvastatin 10 at bedtime  senna 2 at bedtime  sertraline 25 daily      Vital Signs Last 24 Hrs  T(C): 36.4 (26 Dec 2024 17:07), Max: 36.9 (26 Dec 2024 09:00)  T(F): 97.6 (26 Dec 2024 17:07), Max: 98.4 (26 Dec 2024 09:00)  HR: 84 (26 Dec 2024 17:07) (83 - 106)  BP: 133/88 (26 Dec 2024 17:07) (121/75 - 153/95)  BP(mean): --  RR: 18 (26 Dec 2024 17:07) (14 - 18)  SpO2: 95% (26 Dec 2024 17:07) (94% - 99%)    Parameters below as of 26 Dec 2024 17:07  Patient On (Oxygen Delivery Method): room air      PHYSICAL EXAM:  Constitutional: non-toxic, no distress  ENT:  supple  Cardiovascular:   normal S1, S2, RRR; +systolic murmur  Respiratory:  clear BS bilaterally, no wheezes, no rales  Musculoskeletal:  no BUE/BLE edema; L heel DTI w/o purulence  Neurologic: awake and alert  Skin:  RUE PICC s/p removal, no tenderness or purulence                              10.7   7.30  )-----------( 168      ( 26 Dec 2024 04:16 )             32.2       12-26    137  |  102  |  41[H]  ----------------------------<  83  5.0   |  16[L]  |  2.31[H]    Ca    9.1      26 Dec 2024 04:16  Phos  5.2     12-26  Mg     2.4     12-26    TPro  6.9  /  Alb  3.8  /  TBili  0.3  /  DBili  x   /  AST  37  /  ALT  31  /  AlkPhos  154[H]  12-26      Urinalysis Basic - ( 26 Dec 2024 04:16 )    Color: x / Appearance: x / SG: x / pH: x  Gluc: 83 mg/dL / Ketone: x  / Bili: x / Urobili: x   Blood: x / Protein: x / Nitrite: x   Leuk Esterase: x / RBC: x / WBC x   Sq Epi: x / Non Sq Epi: x / Bacteria: x        MICROBIOLOGY:  v  .Blood BLOOD  12-23-24   No growth at 72 Hours  --  --      .Surgical Swab  12-20-24   No growth at 5 days  --  --      Clean Catch Clean Catch (Midstream)  12-19-24   >100,000 CFU/ml Staphylococcus epidermidis "Susceptibilities not  performed"  --  --      .Blood BLOOD  12-19-24   Growth in aerobic bottle: Stenotrophomonas maltophilia  --  Stenotrophomonas maltophilia      .Blood BLOOD  12-19-24   Growth in aerobic bottle: Stenotrophomonas maltophilia  See previous culture [id number]  --    Growth in aerobic bottle: Gram Negative Rods      .Blood BLOOD  12-18-24   Growth in aerobic bottle: Stenotrophomonas maltophilia  Direct identification is available within approximately 3-5  hours either by Blood Panel Multiplexed PCR or Direct  MALDI-TOF. Details: [email or web address]  --  Blood Culture PCR  Stenotrophomonas maltophilia      .Blood BLOOD  12-18-24   Growth in aerobic bottle: Stenotrophomonas maltophilia  See previous culture [id number]  --    Growth in aerobic bottle: Gram Negative Rods          JEAN:  < from: JEAN W or WO Ultrasound Enhancing Agent (12.26.24 @ 15:21) >    TRANSESOPHAGEAL ECHOCARDIOGRAM REPORT  ________________________________________________________________________________                                      _______       Pt. Name:       MASOOD MACARIO Study Date:    12/26/2024  MRN:            [id number]        YOB: 1940  Accession #:    [id number]         Age:           84 years  Account#:       [id number]      Gender:        M  Heart Rate:     100 bpm           Height:        65.75 in (167.00 cm)  Rhythm:        Weight:        182.98 lb (83.00 kg)  Blood Pressure: 139/84 mmHg       BSA/BMI:       1.92 m² / 29.76 kg/m²  ________________________________________________________________________________________  Referring Physician:    [id number] [facility]  Interpreting Physician: Patience Raman MD  Primary Sonographer:    Patience Raman MD    CPT:               ECHO 3D RECONSTRUCT W WORKSTATION - 13205.m;ECHO TRANSESOPH                     W/O CON - 13395.m;DOPPLER ECHO COMP W SPECT - 01095.m;DOPPL                     ECHO COLOR FLOW - 14775.m  Indication(s):     Endocarditis, valve unspecified - I38  Procedure:         Transesophageal echocardiogram performed with 2D, M-mode and                     complete spectral and color flow Doppler. Full volume                     3-dimensional reconstruction performed at the workstation.  Ordering Location: [facility]  Admission Status:  Inpatient  Study Information: Image quality for this study is adequate.    _______________________________________________________________________________________     CONCLUSIONS:      1. No echocardiographic evidence of vegetations.   2. Left ventricular systolic function is moderately to severely decreased. There are no regional wall motion abnormalities seen.   3.Normal right ventricular cavity size, with normal wall thickness, and normal right ventricular systolic function.   4. Mild aortic stenosis.   5. The peak transaortic velocity is 2.50 m/s, peak transaortic gradient is 25.0 mmHg and mean transaortic gradient is 14.0 mmHg with an LVOT/aortic valve VTI ratio of 0.38. The effective orifice area is estimated at 1.57 cm² by the continuity equation.   6. No pericardial effusion seen.   7. Compared to the transthoracic echocardiogram performed on 12/23/2024, there have been no significant interval changes.    ________________________________________________________________________________________  FINDINGS:     Left Ventricle:  Left ventricular wall thickness is normal. Left ventricular systolic function is moderately to severely decreased with a calculated ejection fraction of 35 % by 3D. There are no regional wall motion abnormalities seen. Unable to assess left ventricular diastolic function due to insufficient data.     Right Ventricle:  The right ventricular cavity is normal in size, with normal wall thickness and right ventricular systolic function is normal.     Right Atrium:  The right atrium is normal in size.     Interatrial Septum:  The interatrial septum appears intact.     AorticValve:  The aortic valve appears trileaflet with reduced systolic excursion. There is calcification of the aortic valve leaflets. There is mild aortic stenosis. The peak transaortic velocity is 2.50 m/s, peak transaortic gradient is 25.0 mmHg and mean transaortic gradient is 14.0 mmHg with an LVOT/aortic valve VTI ratio of 0.38. The aortic valve area is estimated at 1.57 cm² by the continuity equation. There is no evidence of aortic regurgitation.     Mitral Valve:  Structurally normal mitral valve with normal leaflet excursion. There is mitral valve thickening of the anterior and posterior leaflets. There is no mitral valve stenosis. There is trace mitral regurgitation.     Tricuspid Valve:  Structurally normal tricuspid valve with normal leaflet excursion. There is trace tricuspid regurgitation.     Pulmonic Valve:  Structurally normal pulmonic valve with normal leaflet excursion. There is trace pulmonic regurgitation.     Aorta:  The aortic root appears normal in size. There is mild atheroma in the visualized portions of the proximal ascending aorta. There is severe atheroma which measures up to 5.00 mm in the visualized portions of the transverse aortic arch. There is moderate atheroma which measures up to 4.00 mm in the visualized portions of the descending aorta.     Pericardium:  No pericardial effusion seen.     Systemic Veins:  The inferior vena cava is normal in size (normal <2.1cm) with normal inspiratory collapse (normal >50%) consistent with normal right atrial pressure (~3, range 0-5mmHg).  ____________________________________________________________________  QUANTITATIVE DATA:  Left Ventricle Measurements: (Indexed to BSA)     3D LV EF%: 35 %            LVOT / RVOT/ Qp/Qs Data: (Indexed to BSA)  LVOT Diameter: 2.30 cm  LVOT Area:     4.15 cm²  LVOT VTI:      17.00 cm  LVOT SV:       70.6 ml  36.77 ml/m²    Aortic Valve Measurements:  AV Vmax:                2.5 m/s  AV Peak Gradient:       25.0 mmHg  AV Mean Gradient:       14.0 mmHg  AV VTI:45.0 cm  AV VTI Ratio:           0.38  AoV EOA, Contin:        1.57 cm²  AoV EOA, Contin i:      0.82 cm²/m²  AoV Dimensionless Index 0.38       Tricuspid Valve Measurements:     RA Pressure: 3 mmHg       --------------------------------------------------------------------------------  Tom[employer]ec:  LV Analysis:  EF:            35 %  EDV:           103.53 ml  ESV:           66.79  SV:            36.75 ml  SV i BSA (/m2) 19.13 ml/m²  LV SDI:        8.85 %  LV Torsion:    0.39       ________________________________________________________________________________________  Electronically signed on 12/26/2024 at 4:18:13 PM by Patience Raman MD         *** Final ***    < end of copied text >        RADIOLOGY:  Imaging below independently reviewed.  < from: Xray Chest 2 Views PA/Lat (12.19.24 @ 11:19) >    ACC: [id number] EXAM:  XR CHEST PA LAT 2V   ORDERED BY: ADE CM     PROCEDURE DATE:  12/19/2024          INTERPRETATION:  EXAMINATION: XR CHEST PA AND LATERAL    CLINICAL INDICATION: Sepsis    TECHNIQUE: 2 views; Frontal and lateral views of the chest were obtained.    COMPARISON: Chest x-ray 6/17/2024.    FINDINGS:  Right upper extremity PICC terminates in the SVC. Surgical clips in the   neck are noted.  The heart is normal in size.  The lungs are clear.  There is no pneumothorax or pleural effusion.  No acute osseous abnormalities. Mild bilateral glenohumeral joint   arthrosis. Degenerative changes of the thoracic spine.    IMPRESSION:  Clear lungs.    --- End of Report ---          BELKIS ANGELES DO; Resident Radiologist  This document has been electronically signed.  NAHID KOVACS MD; Attending Radiologist  This document has been electronically signed. Dec 19 2024 11:29AM    < end of copied text >

## 2024-12-26 NOTE — PROGRESS NOTE ADULT - SUBJECTIVE AND OBJECTIVE BOX
Overnight events noted      VITAL:  T(C): , Max: 36.6 (12-25-24 @ 12:08)  T(F): , Max: 97.9 (12-25-24 @ 20:17)  HR: 96 (12-26-24 @ 05:05)  BP: 142/83 (12-26-24 @ 05:05)  BP(mean): --  RR: 18 (12-26-24 @ 05:05)  SpO2: 94% (12-26-24 @ 05:05)  Wt(kg): --      PHYSICAL EXAM:  Constitutional: Frail; lethargic but alert, NAD  HEENT: NCAT, DMM  Neck: Supple, No JVD  Respiratory: CTA-b/l  Cardiovascular: RRR s1s2, (+)2/6 LESA  Gastrointestinal: BS+, soft, NT/ND  Extremities: No peripheral edema b/l  Neurological: reduced generalized strength  Back: no CVAT b/l  Skin: No rashes, no nevi      LABS:                        10.7   7.30  )-----------( 168      ( 26 Dec 2024 04:16 )             32.2     Na(137)/K(5.0)/Cl(102)/HCO3(16)/BUN(41)/Cr(2.31)Glu(83)/Ca(9.1)/Mg(2.4)/PO4(5.2)    12-26 @ 04:16  Na(134)/K(4.8)/Cl(101)/HCO3(18)/BUN(35)/Cr(2.22)Glu(78)/Ca(8.7)/Mg(2.4)/PO4(4.6)    12-25 @ 06:41  Na(134)/K(4.7)/Cl(101)/HCO3(20)/BUN(33)/Cr(2.21)Glu(70)/Ca(8.6)/Mg(2.4)/PO4(4.5)    12-24 @ 06:55      IMPRESSION: 84M w/ HTN, gout, HFrEF, AS, USHA, CKD4, and MDS-chemo, 12/19/24 p/w stenotrophomonas bacteremia    (1)CKD - stage 4 - USHA-associated  (2)JUAN RAMON - mild - likely physiologic effect from Bactrim - mild/not overly concerning  (3)Metabolic acidosis - multifactorial from CKD and from Bactrim - now on PO NaHCO3  (4)Hyperphosphatemia - renally mediated - now on low-dose Renvela and renal diet  (5)Hyperkalemia - mild; on renal diet  (6)ID -stenotrophomonas bacteremia - ID on board - likely due to infected PICC - PICC removed - on PO Bactrim - awaiting JEAN today  (7)CV - acceptable BP/volume      RECOMMEND:  (1)PO NaHCO3 as ordered; increase from 650mg tid to 1300mg tid if HCO3 trends to 15 or lower  (2)PO Renvela as ordered for now  (3)F/U JEAN  (4)If for discharge, would have him f/u at my office in 1-4 weeks      Edis Cabral MD  [facility]  Office/on call physician: [phone]  Cell (7a-7p): [phone]       no pain, no sob      VITAL:  T(C): , Max: 36.6 (12-25-24 @ 12:08)  T(F): , Max: 97.9 (12-25-24 @ 20:17)  HR: 96 (12-26-24 @ 05:05)  BP: 142/83 (12-26-24 @ 05:05)  BP(mean): --  RR: 18 (12-26-24 @ 05:05)  SpO2: 94% (12-26-24 @ 05:05)  Wt(kg): --      PHYSICAL EXAM:  Constitutional: Frail; alert, NAD  HEENT: NCAT, DMM  Neck: Supple, No JVD  Respiratory: CTA-b/l  Cardiovascular: RRR s1s2, (+)2/6 LESA  Gastrointestinal: BS+, soft, NT/ND  Extremities: No peripheral edema b/l  Neurological: reduced generalized strength  Back: no CVAT b/l  Skin: No rashes, no nevi      LABS:                        10.7   7.30  )-----------( 168      ( 26 Dec 2024 04:16 )             32.2     Na(137)/K(5.0)/Cl(102)/HCO3(16)/BUN(41)/Cr(2.31)Glu(83)/Ca(9.1)/Mg(2.4)/PO4(5.2)    12-26 @ 04:16  Na(134)/K(4.8)/Cl(101)/HCO3(18)/BUN(35)/Cr(2.22)Glu(78)/Ca(8.7)/Mg(2.4)/PO4(4.6)    12-25 @ 06:41  Na(134)/K(4.7)/Cl(101)/HCO3(20)/BUN(33)/Cr(2.21)Glu(70)/Ca(8.6)/Mg(2.4)/PO4(4.5)    12-24 @ 06:55      IMPRESSION: 84M w/ HTN, gout, HFrEF, AS, USHA, CKD4, and MDS-chemo, 12/19/24 p/w stenotrophomonas bacteremia    (1)CKD - stage 4 - USHA-associated  (2)JUAN RAMON - mild - likely physiologic effect from Bactrim - mild/not overly concerning  (3)Metabolic acidosis - multifactorial from CKD and from Bactrim - now on PO NaHCO3  (4)Hyperphosphatemia - renally mediated - now on low-dose Renvela and renal diet  (5)Hyperkalemia - mild; on renal diet  (6)ID -stenotrophomonas bacteremia - ID on board - likely due to infected PICC - PICC removed - on PO Bactrim - awaiting JEAN today  (7)CV - acceptable BP/volume      RECOMMEND:  (1)PO NaHCO3 as ordered; increase from 650mg tid to 1300mg tid if HCO3 trends to 15 or lower  (2)PO Renvela as ordered for now  (3)F/U JEAN  (4)If for discharge, would have him f/u at my office in 1-4 weeks      Edis Cabral MD  [facility]  Office/on call physician: [phone]  Cell (7a-7p): [phone]

## 2024-12-26 NOTE — ADVANCED PRACTICE NURSE CONSULT - REASON FOR CONSULT
Vascular Access Team    Evaluation for: Bedside SL PICC placement  Requested by name: PeresChema  Date/Time: 12/26 @ 11:04    Indication:Bacteremia but cultures are now clear X5 Days  Pending JEAN today  Has MDS, receives chemotherapy  Likely d/c tonight if JEAN occurs and is negative    Allergy to CHG or Heparin or Lidocaine: no  Platelets(>20): 168  INR(<3): 1.10  eGFR(>40): 27  Blood cultures sent: 12/26  Blood culture negative in 48hrs: pending  Anticoagulants: asa 81mg, plavix, hep SQ  Arms DVT: no  Mastectomy: no  Fistula: no  PPM/Defib: no  IR or Nephrology or ID clearance needed: ID for pending bld cx sent 12/26    Consent obtained: yes  Pending: blood cultures    Plan: Bedside picc order evaluated.  
Bedside picc order placed.   Indication: DL picc placement for chemotherapy

## 2024-12-27 ENCOUNTER — TRANSCRIPTION ENCOUNTER (OUTPATIENT)
Age: 84
End: 2024-12-27

## 2024-12-27 VITALS
RESPIRATION RATE: 18 BRPM | HEART RATE: 81 BPM | DIASTOLIC BLOOD PRESSURE: 87 MMHG | SYSTOLIC BLOOD PRESSURE: 139 MMHG | OXYGEN SATURATION: 98 % | TEMPERATURE: 97 F

## 2024-12-27 DIAGNOSIS — I50.20 UNSPECIFIED SYSTOLIC (CONGESTIVE) HEART FAILURE: ICD-10-CM

## 2024-12-27 LAB
ALBUMIN SERPL ELPH-MCNC: 3.9 G/DL — SIGNIFICANT CHANGE UP (ref 3.3–5)
ALP SERPL-CCNC: 161 U/L — HIGH (ref 40–120)
ALT FLD-CCNC: 31 U/L — SIGNIFICANT CHANGE UP (ref 10–45)
ANION GAP SERPL CALC-SCNC: 15 MMOL/L — SIGNIFICANT CHANGE UP (ref 5–17)
AST SERPL-CCNC: 32 U/L — SIGNIFICANT CHANGE UP (ref 10–40)
BASOPHILS # BLD AUTO: 0.07 K/UL — SIGNIFICANT CHANGE UP (ref 0–0.2)
BASOPHILS NFR BLD AUTO: 0.9 % — SIGNIFICANT CHANGE UP (ref 0–2)
BILIRUB SERPL-MCNC: 0.3 MG/DL — SIGNIFICANT CHANGE UP (ref 0.2–1.2)
BUN SERPL-MCNC: 42 MG/DL — HIGH (ref 7–23)
CALCIUM SERPL-MCNC: 8.8 MG/DL — SIGNIFICANT CHANGE UP (ref 8.4–10.5)
CHLORIDE SERPL-SCNC: 102 MMOL/L — SIGNIFICANT CHANGE UP (ref 96–108)
CO2 SERPL-SCNC: 16 MMOL/L — LOW (ref 22–31)
CREAT SERPL-MCNC: 2.39 MG/DL — HIGH (ref 0.5–1.3)
EGFR: 26 ML/MIN/1.73M2 — LOW
EOSINOPHIL # BLD AUTO: 0.19 K/UL — SIGNIFICANT CHANGE UP (ref 0–0.5)
EOSINOPHIL NFR BLD AUTO: 2.4 % — SIGNIFICANT CHANGE UP (ref 0–6)
GLUCOSE SERPL-MCNC: 82 MG/DL — SIGNIFICANT CHANGE UP (ref 70–99)
HCT VFR BLD CALC: 34 % — LOW (ref 39–50)
HGB BLD-MCNC: 11.3 G/DL — LOW (ref 13–17)
IMM GRANULOCYTES NFR BLD AUTO: 1.9 % — HIGH (ref 0–0.9)
LYMPHOCYTES # BLD AUTO: 1.19 K/UL — SIGNIFICANT CHANGE UP (ref 1–3.3)
LYMPHOCYTES # BLD AUTO: 15.2 % — SIGNIFICANT CHANGE UP (ref 13–44)
MAGNESIUM SERPL-MCNC: 2.5 MG/DL — SIGNIFICANT CHANGE UP (ref 1.6–2.6)
MCHC RBC-ENTMCNC: 33.2 G/DL — SIGNIFICANT CHANGE UP (ref 32–36)
MCHC RBC-ENTMCNC: 35.5 PG — HIGH (ref 27–34)
MCV RBC AUTO: 106.9 FL — HIGH (ref 80–100)
MONOCYTES # BLD AUTO: 0.38 K/UL — SIGNIFICANT CHANGE UP (ref 0–0.9)
MONOCYTES NFR BLD AUTO: 4.9 % — SIGNIFICANT CHANGE UP (ref 2–14)
NEUTROPHILS # BLD AUTO: 5.84 K/UL — SIGNIFICANT CHANGE UP (ref 1.8–7.4)
NEUTROPHILS NFR BLD AUTO: 74.7 % — SIGNIFICANT CHANGE UP (ref 43–77)
NRBC # BLD: 0 /100 WBCS — SIGNIFICANT CHANGE UP (ref 0–0)
PHOSPHATE SERPL-MCNC: 4.9 MG/DL — HIGH (ref 2.5–4.5)
PLATELET # BLD AUTO: 160 K/UL — SIGNIFICANT CHANGE UP (ref 150–400)
POTASSIUM SERPL-MCNC: 5 MMOL/L — SIGNIFICANT CHANGE UP (ref 3.5–5.3)
POTASSIUM SERPL-SCNC: 5 MMOL/L — SIGNIFICANT CHANGE UP (ref 3.5–5.3)
PROT SERPL-MCNC: 6.8 G/DL — SIGNIFICANT CHANGE UP (ref 6–8.3)
RBC # BLD: 3.18 M/UL — LOW (ref 4.2–5.8)
RBC # FLD: 18.2 % — HIGH (ref 10.3–14.5)
SODIUM SERPL-SCNC: 133 MMOL/L — LOW (ref 135–145)
WBC # BLD: 7.82 K/UL — SIGNIFICANT CHANGE UP (ref 3.8–10.5)
WBC # FLD AUTO: 7.82 K/UL — SIGNIFICANT CHANGE UP (ref 3.8–10.5)

## 2024-12-27 PROCEDURE — 85730 THROMBOPLASTIN TIME PARTIAL: CPT

## 2024-12-27 PROCEDURE — 82330 ASSAY OF CALCIUM: CPT

## 2024-12-27 PROCEDURE — 86901 BLOOD TYPING SEROLOGIC RH(D): CPT

## 2024-12-27 PROCEDURE — 97110 THERAPEUTIC EXERCISES: CPT

## 2024-12-27 PROCEDURE — 81001 URINALYSIS AUTO W/SCOPE: CPT

## 2024-12-27 PROCEDURE — 85610 PROTHROMBIN TIME: CPT

## 2024-12-27 PROCEDURE — 99232 SBSQ HOSP IP/OBS MODERATE 35: CPT

## 2024-12-27 PROCEDURE — 82962 GLUCOSE BLOOD TEST: CPT

## 2024-12-27 PROCEDURE — 87040 BLOOD CULTURE FOR BACTERIA: CPT

## 2024-12-27 PROCEDURE — C1894: CPT

## 2024-12-27 PROCEDURE — 71046 X-RAY EXAM CHEST 2 VIEWS: CPT

## 2024-12-27 PROCEDURE — 76377 3D RENDER W/INTRP POSTPROCES: CPT

## 2024-12-27 PROCEDURE — C1751: CPT

## 2024-12-27 PROCEDURE — 36569 INSJ PICC 5 YR+ W/O IMAGING: CPT

## 2024-12-27 PROCEDURE — 87640 STAPH A DNA AMP PROBE: CPT

## 2024-12-27 PROCEDURE — 93312 ECHO TRANSESOPHAGEAL: CPT

## 2024-12-27 PROCEDURE — 87070 CULTURE OTHR SPECIMN AEROBIC: CPT

## 2024-12-27 PROCEDURE — 87184 SC STD DISK METHOD PER PLATE: CPT

## 2024-12-27 PROCEDURE — 99239 HOSP IP/OBS DSCHRG MGMT >30: CPT | Mod: GC

## 2024-12-27 PROCEDURE — 87086 URINE CULTURE/COLONY COUNT: CPT

## 2024-12-27 PROCEDURE — 36415 COLL VENOUS BLD VENIPUNCTURE: CPT

## 2024-12-27 PROCEDURE — 84295 ASSAY OF SERUM SODIUM: CPT

## 2024-12-27 PROCEDURE — 87186 SC STD MICRODIL/AGAR DIL: CPT

## 2024-12-27 PROCEDURE — 86900 BLOOD TYPING SEROLOGIC ABO: CPT

## 2024-12-27 PROCEDURE — 97530 THERAPEUTIC ACTIVITIES: CPT

## 2024-12-27 PROCEDURE — 85025 COMPLETE CBC W/AUTO DIFF WBC: CPT

## 2024-12-27 PROCEDURE — 82947 ASSAY GLUCOSE BLOOD QUANT: CPT

## 2024-12-27 PROCEDURE — 82803 BLOOD GASES ANY COMBINATION: CPT

## 2024-12-27 PROCEDURE — 71045 X-RAY EXAM CHEST 1 VIEW: CPT

## 2024-12-27 PROCEDURE — 97161 PT EVAL LOW COMPLEX 20 MIN: CPT

## 2024-12-27 PROCEDURE — 80053 COMPREHEN METABOLIC PANEL: CPT

## 2024-12-27 PROCEDURE — 99285 EMERGENCY DEPT VISIT HI MDM: CPT

## 2024-12-27 PROCEDURE — 87077 CULTURE AEROBIC IDENTIFY: CPT

## 2024-12-27 PROCEDURE — 93325 DOPPLER ECHO COLOR FLOW MAPG: CPT

## 2024-12-27 PROCEDURE — 93308 TTE F-UP OR LMTD: CPT

## 2024-12-27 PROCEDURE — 84100 ASSAY OF PHOSPHORUS: CPT

## 2024-12-27 PROCEDURE — 85018 HEMOGLOBIN: CPT

## 2024-12-27 PROCEDURE — 87075 CULTR BACTERIA EXCEPT BLOOD: CPT

## 2024-12-27 PROCEDURE — 85014 HEMATOCRIT: CPT

## 2024-12-27 PROCEDURE — 83735 ASSAY OF MAGNESIUM: CPT

## 2024-12-27 PROCEDURE — 84132 ASSAY OF SERUM POTASSIUM: CPT

## 2024-12-27 PROCEDURE — 82435 ASSAY OF BLOOD CHLORIDE: CPT

## 2024-12-27 PROCEDURE — 93320 DOPPLER ECHO COMPLETE: CPT

## 2024-12-27 PROCEDURE — 87641 MR-STAPH DNA AMP PROBE: CPT

## 2024-12-27 PROCEDURE — 83605 ASSAY OF LACTIC ACID: CPT

## 2024-12-27 PROCEDURE — 86850 RBC ANTIBODY SCREEN: CPT

## 2024-12-27 RX ORDER — SEVELAMER CARBONATE 800 MG/1
1 TABLET, FILM COATED ORAL
Qty: 30 | Refills: 0
Start: 2024-12-27 | End: 2025-01-25

## 2024-12-27 RX ORDER — SODIUM BICARBONATE 84 MG/ML
1 INJECTION, SOLUTION INTRAVENOUS
Qty: 90 | Refills: 0
Start: 2024-12-27 | End: 2025-01-25

## 2024-12-27 RX ORDER — CARVEDILOL 25 MG/1
1 TABLET, FILM COATED ORAL
Qty: 60 | Refills: 1
Start: 2024-12-27 | End: 2025-02-24

## 2024-12-27 RX ORDER — CARVEDILOL 25 MG/1
3.12 TABLET, FILM COATED ORAL EVERY 12 HOURS
Refills: 0 | Status: DISCONTINUED | OUTPATIENT
Start: 2024-12-27 | End: 2024-12-27

## 2024-12-27 RX ORDER — SULFAMETHOXAZOLE/TRIMETHOPRIM 800-160 MG
1 TABLET ORAL
Qty: 24 | Refills: 0
Start: 2024-12-27 | End: 2025-01-03

## 2024-12-27 RX ORDER — ACETAMINOPHEN 80 MG/.8ML
650 SOLUTION/ DROPS ORAL ONCE
Refills: 0 | Status: COMPLETED | OUTPATIENT
Start: 2024-12-27 | End: 2024-12-27

## 2024-12-27 RX ADMIN — Medication 17 GRAM(S): at 05:18

## 2024-12-27 RX ADMIN — Medication 1 TABLET(S): at 13:55

## 2024-12-27 RX ADMIN — SODIUM BICARBONATE 650 MILLIGRAM(S): 84 INJECTION, SOLUTION INTRAVENOUS at 13:55

## 2024-12-27 RX ADMIN — SEVELAMER CARBONATE 800 MILLIGRAM(S): 800 TABLET, FILM COATED ORAL at 16:35

## 2024-12-27 RX ADMIN — HEPARIN SODIUM 5000 UNIT(S): 1000 INJECTION, SOLUTION INTRAVENOUS; SUBCUTANEOUS at 13:55

## 2024-12-27 RX ADMIN — Medication 1 TABLET(S): at 05:17

## 2024-12-27 RX ADMIN — Medication 400 MILLIGRAM(S): at 16:35

## 2024-12-27 RX ADMIN — Medication 17 GRAM(S): at 16:35

## 2024-12-27 RX ADMIN — SODIUM BICARBONATE 650 MILLIGRAM(S): 84 INJECTION, SOLUTION INTRAVENOUS at 05:17

## 2024-12-27 RX ADMIN — ACETAMINOPHEN 650 MILLIGRAM(S): 80 SOLUTION/ DROPS ORAL at 12:14

## 2024-12-27 RX ADMIN — SERTRALINE HYDROCHLORIDE 25 MILLIGRAM(S): 25 TABLET ORAL at 12:10

## 2024-12-27 RX ADMIN — PANTOPRAZOLE 40 MILLIGRAM(S): 40 TABLET, DELAYED RELEASE ORAL at 05:17

## 2024-12-27 RX ADMIN — CHLORHEXIDINE GLUCONATE 1 APPLICATION(S): 1.2 RINSE ORAL at 12:10

## 2024-12-27 RX ADMIN — MINOCYCLINE HYDROCHLORIDE 200 MILLIGRAM(S): 100 CAPSULE ORAL at 05:18

## 2024-12-27 RX ADMIN — Medication 1 APPLICATION(S): at 12:11

## 2024-12-27 RX ADMIN — GABAPENTIN 300 MILLIGRAM(S): 300 CAPSULE ORAL at 16:35

## 2024-12-27 RX ADMIN — CLOPIDOGREL BISULFATE 75 MILLIGRAM(S): 75 TABLET, FILM COATED ORAL at 12:10

## 2024-12-27 RX ADMIN — Medication 400 MILLIGRAM(S): at 05:18

## 2024-12-27 RX ADMIN — FLUCONAZOLE 200 MILLIGRAM(S): 200 TABLET ORAL at 12:10

## 2024-12-27 RX ADMIN — GABAPENTIN 300 MILLIGRAM(S): 300 CAPSULE ORAL at 05:18

## 2024-12-27 RX ADMIN — HEPARIN SODIUM 5000 UNIT(S): 1000 INJECTION, SOLUTION INTRAVENOUS; SUBCUTANEOUS at 05:18

## 2024-12-27 RX ADMIN — Medication 81 MILLIGRAM(S): at 12:12

## 2024-12-27 RX ADMIN — ALLOPURINOL 200 MILLIGRAM(S): 100 TABLET ORAL at 12:10

## 2024-12-27 RX ADMIN — ACETAMINOPHEN 650 MILLIGRAM(S): 80 SOLUTION/ DROPS ORAL at 13:15

## 2024-12-27 NOTE — PROGRESS NOTE ADULT - SUBJECTIVE AND OBJECTIVE BOX
Overnight events noted      VITAL:  T(C): , Max: 36.9 (12-26-24 @ 09:00)  T(F): , Max: 98.4 (12-26-24 @ 09:00)  HR: 81 (12-27-24 @ 04:00)  BP: 147/92 (12-27-24 @ 04:00)  RR: 18 (12-27-24 @ 04:00)  SpO2: 98% (12-27-24 @ 04:00)      PHYSICAL EXAM:  Constitutional: Frail; alert, NAD  HEENT: NCAT, DMM  Neck: Supple, No JVD  Respiratory: CTA-b/l  Cardiovascular: RRR s1s2, (+)2/6 LESA  Gastrointestinal: BS+, soft, NT/ND  Extremities: No peripheral edema b/l  Neurological: reduced generalized strength  Back: no CVAT b/l  Skin: No rashes, no nevi    LABS:                        11.3   7.82  )-----------( 160      ( 27 Dec 2024 06:01 )             34.0     Na(133)/K(5.0)/Cl(102)/HCO3(16)/BUN(42)/Cr(2.39)Glu(82)/Ca(8.8)/Mg(2.5)/PO4(4.9)    12-27 @ 06:01  Na(137)/K(5.0)/Cl(102)/HCO3(16)/BUN(41)/Cr(2.31)Glu(83)/Ca(9.1)/Mg(2.4)/PO4(5.2)    12-26 @ 04:16  Na(134)/K(4.8)/Cl(101)/HCO3(18)/BUN(35)/Cr(2.22)Glu(78)/Ca(8.7)/Mg(2.4)/PO4(4.6)    12-25 @ 06:41      IMAGING:  < from: JEAN W or WO Ultrasound Enhancing Agent (12.26.24 @ 15:21) >   1. No echocardiographic evidence of vegetations.   2. Left ventricular systolic function is moderately to severely decreased. There are no regional wall motion abnormalities seen.   3.Normal right ventricular cavity size, with normal wall thickness, and normal right ventricular systolic function.   4. Mild aortic stenosis.   5. The peak transaortic velocity is 2.50 m/s, peak transaortic gradient is 25.0 mmHg and mean transaortic gradient is 14.0 mmHg with an LVOT/aortic valve VTI ratio of 0.38. The effective orifice area is estimated at 1.57 cm² by the continuity equation.   6. No pericardial effusion seen.   7. Compared to the transthoracic echocardiogram performed on 12/23/2024, there have been no significant interval changes.          IMPRESSION: 84M w/ HTN, gout, HFrEF, AS, USHA, CKD4, and MDS-chemo, 12/19/24 p/w stenotrophomonas bacteremia    (1)CKD - stage 4 - USHA-associated  (2)JUAN RAMON - mild - likely physiologic effect from Bactrim - numbers appear now to have plateaued  (3)Metabolic acidosis - multifactorial from CKD and from Bactrim - now on PO NaHCO3  (4)Hyperphosphatemia - renally mediated - now on low-dose Renvela and renal diet; PO4 improving  (5)Hyperkalemia - mild/plateaued, on renal diet  (6)ID -stenotrophomonas bacteremia - ID on board - likely due to infected PICC - PICC removed - on PO Bactrim - no vegetation noted on JEAN 12/26  (7)CV - acceptable BP/volume      RECOMMEND:  (1)PO NaHCO3 as ordered; increase from 650mg tid to 1300mg tid if HCO3 trends to 15 or lower  (2)PO Renvela as ordered for now  (3)If for discharge, would have him f/u at my office in 1-4 weeks          Edis Cabral MD  [facility]  Office/on call physician: [phone]  Cell (7a-7p): [phone]       no pain, no sob      VITAL:  T(C): , Max: 36.9 (12-26-24 @ 09:00)  T(F): , Max: 98.4 (12-26-24 @ 09:00)  HR: 81 (12-27-24 @ 04:00)  BP: 147/92 (12-27-24 @ 04:00)  RR: 18 (12-27-24 @ 04:00)  SpO2: 98% (12-27-24 @ 04:00)      PHYSICAL EXAM:  Constitutional: Frail; alert, NAD  HEENT: NCAT, DMM  Neck: Supple, No JVD  Respiratory: CTA-b/l  Cardiovascular: RRR s1s2, (+)2/6 LESA  Gastrointestinal: BS+, soft, NT/ND  Extremities: No peripheral edema b/l  Neurological: reduced generalized strength  Back: no CVAT b/l  Skin: No rashes, no nevi    LABS:                        11.3   7.82  )-----------( 160      ( 27 Dec 2024 06:01 )             34.0     Na(133)/K(5.0)/Cl(102)/HCO3(16)/BUN(42)/Cr(2.39)Glu(82)/Ca(8.8)/Mg(2.5)/PO4(4.9)    12-27 @ 06:01  Na(137)/K(5.0)/Cl(102)/HCO3(16)/BUN(41)/Cr(2.31)Glu(83)/Ca(9.1)/Mg(2.4)/PO4(5.2)    12-26 @ 04:16  Na(134)/K(4.8)/Cl(101)/HCO3(18)/BUN(35)/Cr(2.22)Glu(78)/Ca(8.7)/Mg(2.4)/PO4(4.6)    12-25 @ 06:41      IMAGING:  < from: JEAN W or WO Ultrasound Enhancing Agent (12.26.24 @ 15:21) >   1. No echocardiographic evidence of vegetations.   2. Left ventricular systolic function is moderately to severely decreased. There are no regional wall motion abnormalities seen.   3.Normal right ventricular cavity size, with normal wall thickness, and normal right ventricular systolic function.   4. Mild aortic stenosis.   5. The peak transaortic velocity is 2.50 m/s, peak transaortic gradient is 25.0 mmHg and mean transaortic gradient is 14.0 mmHg with an LVOT/aortic valve VTI ratio of 0.38. The effective orifice area is estimated at 1.57 cm² by the continuity equation.   6. No pericardial effusion seen.   7. Compared to the transthoracic echocardiogram performed on 12/23/2024, there have been no significant interval changes.          IMPRESSION: 84M w/ HTN, gout, HFrEF, AS, USHA, CKD4, and MDS-chemo, 12/19/24 p/w stenotrophomonas bacteremia    (1)CKD - stage 4 - USHA-associated  (2)JUAN RAMON - mild - likely physiologic effect from Bactrim - numbers appear now to have plateaued  (3)Metabolic acidosis - multifactorial from CKD and from Bactrim - now on PO NaHCO3  (4)Hyperphosphatemia - renally mediated - now on low-dose Renvela and renal diet; PO4 improving  (5)Hyperkalemia - mild/plateaued, on renal diet  (6)ID -stenotrophomonas bacteremia - ID on board - likely due to infected PICC - PICC removed - on PO Bactrim - no vegetation noted on JEAN 12/26  (7)CV - acceptable BP/volume      RECOMMEND:  (1)PO NaHCO3 as ordered; increase from 650mg tid to 1300mg tid if HCO3 trends to 15 or lower  (2)PO Renvela as ordered for now  (3)If for discharge, would have him f/u at my office in 1-4 weeks. Would have him undergo repeat labwork late next week. My office will set him up for a home blood draw Thursday 1/2/24.          Edis Cabral MD  [facility]  Office/on call physician: [phone]  Cell (7a-7p): [phone]

## 2024-12-27 NOTE — PROGRESS NOTE ADULT - PROBLEM SELECTOR PROBLEM 6
Spinal stenosis, lumbosacral region
Chronic kidney disease, stage 3b
Spinal stenosis, lumbosacral region

## 2024-12-27 NOTE — PROGRESS NOTE ADULT - PROBLEM SELECTOR PROBLEM 9
Inflammatory disease of prostate, unspecified
Essential (primary) hypertension

## 2024-12-27 NOTE — PROGRESS NOTE ADULT - ASSESSMENT
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    CXR neg. Likely PICC as a source. Patient has a systolic murmur w/ recurrence of Stenotrophomonas bacteremia. Would recommend IE workup. PICC line removed 12/20. Heel DTI management per podiatry, not considered a source at this time.    TTE and JEAN w/o vegetation. Would treat for 2 weeks for Stenotrophomonas bacteremia.  S/p PICC reinsertion 12/27.    Cultures:  12/18 BCx Stenotrophomonas maltophilia (2 out of 4)  12/19 BCx Stenotrophomonas maltophilia (2 out of 4)  12/19 UCx Staph epi  12/20 PICC tip culture neg  12/23 BCx neg  12/26 BCx neg      Antimicrobials:  TMP/SMX 12/19-  Minocycline 12/20-  Fluconazole ppx  Acyclovir ppx      #Stenotrophomonas bacteremia  #MDS on chemo  #Recurrent bacteremia  #Systolic murmurs    Recommendations:  - Continue with Bactrim to DS 1 tab Q8H.  - S/p minocycline  - End date 2 weeks from PICC line removal (end 1/3/24).      Plan discussed with primary team house staff.  Thank you for this consult.    Bert Jordan MD, PhD  Attending Physician  Division of Infectious Diseases  Department of Medicine    Please contact through [facility].  Office: [phone] (after 5 PM or weekend)

## 2024-12-27 NOTE — PROGRESS NOTE ADULT - PROBLEM SELECTOR PROBLEM 2
HFrEF (heart failure with reduced ejection fraction)
Left ankle pain

## 2024-12-27 NOTE — DISCHARGE NOTE NURSING/CASE MANAGEMENT/SOCIAL WORK - PATIENT PORTAL LINK FT
You can access the FollowMyHealth Patient Portal offered by [facility] by registering at the following website: [email or web address]. By joining [facility]’s FollowMyHealth portal, you will also be able to view your health information using other applications (apps) compatible with our system.

## 2024-12-27 NOTE — PROGRESS NOTE ADULT - PROBLEM SELECTOR PLAN 8
Plan  - continue with home meds
Plan  - continue with rosuvastatin, aspirin

## 2024-12-27 NOTE — DISCHARGE NOTE NURSING/CASE MANAGEMENT/SOCIAL WORK - NSDCFUADDAPPT_GEN_ALL_CORE_FT
Podiatry Discharge Instructions:  Follow up: Please follow up with Dr. Juan within 1 week of discharge from the hospital, please call [phone] for appointment and discuss that you recently were seen in the hospital.  Wound Care: Please apply iodosorb, 4x4 guaze, ABD, fernando  Weight bearing: Please wear z flows while resting in bed at all times to prewvent further decubitus ulcers.   Antibiotics: Please continue as instructed.    APPTS ARE READY TO BE MADE: [X] YES    Best Family or Patient Contact (if needed):    Additional Information about above appointments (if needed):    1: Primary Care Physician - Within 1 week of discharge   2: Hematologist - Dr. Mcmahon within 1 week of discharge  3: Cardiologist - within 1 week of discharge  4: Nephrologist - within 1 week of discharge    Other comments or requests:

## 2024-12-27 NOTE — CHART NOTE - NSCHARTNOTEFT_GEN_A_CORE
Patient requires a patient lift for transfers between bed and (chair, wheelchair, or commode). Without the use of a lift, the patient would be bed confined.    Chema Peres MD

## 2024-12-27 NOTE — PROGRESS NOTE ADULT - PROBLEM SELECTOR PROBLEM 4
Myelodysplastic syndrome
Myelodysplastic syndrome
Frequent UTI
Myelodysplastic syndrome

## 2024-12-27 NOTE — PROGRESS NOTE ADULT - PROVIDER SPECIALTY LIST ADULT
Infectious Disease
Infectious Disease
Internal Medicine
Nephrology
Nephrology
Infectious Disease
Nephrology
Infectious Disease
Internal Medicine

## 2024-12-27 NOTE — PROGRESS NOTE ADULT - ATTENDING COMMENTS
84M w/ MDS on chemo (last dose 12/18), CAD, CKD stg 3b, HTN, & HLD p/w S. maltophilia bacteremia on 12/18/24 .     Stenotrophomonas bacteremia: Bactrim PO Q8 and minocycline 200 mg  po q12 per ID. Repeat blood cultures until clears. TTE without evidence of infective endocarditis. F/u JEAN.  L ankle pain (no fx on XR), R foot eschar - Iodosorb rec'd by Podiatry  MDS: dacogen being held during hospitalization, but continue Acyclovir/fluconazole prophylaxis. Outpatient levofloxacin will be held given current broad-spectrum antibiotics.  Other chronic problems include spinal stenosis (managed with gabapentin), asymptomatic CAD (on rosuvastatin and aspirin), prostatitis (on home medications), HTN (on amlodipine) and CKD, medications will be renally dosed.
Stenotrophomonas maltophilia bacteremia- continue Bactrim 200 mg IVSS Q12- ID consult pending- PICC discontinued  MDS- continue Acyclovir/fluconazole prophylaxis  CKD stage 3- stable- renally dose medications
84M w/ MDS on chemo (last dose 12/18), CAD, CKD stg 3b, HTN, & HLD p/w S. maltophilia bacteremia on 12/18/24 .     Stenotrophomonas bacteremia: Bactrim PO Q8 and minocycline 200 mg  po q12 per ID. Repeat blood cultures until clears. TTE without evidence of infective endocarditis. F/u JEAN.  L ankle pain (no fx on XR), R foot eschar - Iodosorb rec'd by Podiatry  MDS: dacogen being held during hospitalization, but continue Acyclovir/fluconazole prophylaxis. Outpatient levofloxacin will be held given current broad-spectrum antibiotics.  Other chronic problems include spinal stenosis (managed with gabapentin), asymptomatic CAD (on rosuvastatin and aspirin), prostatitis (on home medications), HTN (on amlodipine) and CKD, medications will be renally dosed
84M w/ MDS on chemo (last dose 12/18), CAD, CKD stg 3b, HTN, & HLD p/w S. maltophilia bacteremia on 12/18/24 .     Stenotrophomonas bacteremia: Bactrim PO Q8 and minocycline 200 mg  po q12 per ID. Repeat blood cultures until clears. TTE without evidence of infective endocarditis. JEAN is pending.  L ankle pain (no fx on XR), R foot eschar - Iodosorb rec'd by Podiatry  MDS: dacogen being held during hospitalization, but continue Acyclovir/fluconazole prophylaxis. Outpatient levofloxacin will be held given current broad-spectrum antibiotics.  Other chronic problems include spinal stenosis (managed with gabapentin), asymptomatic CAD (on rosuvastatin and aspirin), prostatitis (on home medications), HTN (on amlodipine) and CKD, medications will be renally dosed
84M w/ MDS on chemo (last dose 12/18), CAD, CKD stg 3b, HTN, & HLD p/w S. maltophilia bacteremia on 12/18/24 .     Stenotrophomonas maltophilia bacteremia: Bactrim 240 mg PO Q8 and minocycline 200 mg  po q12 per ID   L ankle pain (no fx on XR), R foot eschar (Iodosorb rec'd by Podiatry  MDS: dacogen being held during hospitalization, but continue Acyclovir/fluconazole prophylaxis. Outpatient levofloxacin will be held given current broad-spectrum antibiotics.  Other chronic problems include spinal stenosis (managed with gabapentin), asymptomatic CAD (on rosuvastatin and aspirin), prostatitis (on home medications), HTN (on amlodipine) and CKD, medications will be renally dosed.
84M w/ MDS on chemo (last dose 12/18), CAD, CKD stg 3b, HTN, & HLD p/w S. maltophilia bacteremia on 12/18/24 .     Stenotrophomonas maltophilia bacteremia: Bactrim 240 mg PO Q8 and minocycline 200 mg  po q12 per ID   L ankle pain (no fx on XR), R foot eschar (Iodosorb rec'd by Podiatry  MDS: dacogen being held during hospitalization, but continue Acyclovir/fluconazole prophylaxis. Outpatient levofloxacin will be held given current broad-spectrum antibiotics.  Other chronic problems include spinal stenosis (managed with gabapentin), asymptomatic CAD (on rosuvastatin and aspirin), prostatitis (on home medications), HTN (on amlodipine) and CKD, medications will be renally dosed.
84M w/ MDS on chemo (last dose 12/18), CAD, CKD stg 3b, HTN, & HLD p/w S. maltophilia bacteremia on 12/18/24 .     Stenotrophomonas bacteremia: Bactrim PO Q8 per ID. TTE and JEAN without evidence of infective endocarditis. However LV function diminished, given renal function limited options for GDMT however will resume home metoprolol  L ankle pain (no fx on XR), R foot eschar - Iodosorb rec'd by Podiatry  MDS: dacogen being held during hospitalization, but continue Acyclovir/fluconazole prophylaxis. Outpatient levofloxacin will be held given current broad-spectrum antibiotics.  Other chronic problems include spinal stenosis (managed with gabapentin), asymptomatic CAD (on rosuvastatin and aspirin), prostatitis (on home medications), HTN (on amlodipine) and CKD, medications will be renally dosed .     Total time discharge planning 42 minutes
84M w/ MDS on chemo (last dose 12/18), CAD, CKD stg 3b, HTN, & HLD p/w S. maltophilia bacteremia on 12/18/24 .     Stenotrophomonas maltophilia bacteremia: Bactrim PO Q8 and minocycline 200 mg  po q12 per ID. Repeat blood cultures until clears. T/u TTE. May need JENA.  L ankle pain (no fx on XR), R foot eschar (Iodosorb rec'd by Podiatry  MDS: dacogen being held during hospitalization, but continue Acyclovir/fluconazole prophylaxis. Outpatient levofloxacin will be held given current broad-spectrum antibiotics.  Other chronic problems include spinal stenosis (managed with gabapentin), asymptomatic CAD (on rosuvastatin and aspirin), prostatitis (on home medications), HTN (on amlodipine) and CKD, medications will be renally dosed.

## 2024-12-27 NOTE — PROGRESS NOTE ADULT - PROBLEM SELECTOR PROBLEM 7
Atherosclerotic heart disease of native coronary artery without angina pectoris
Spinal stenosis, lumbosacral region
Atherosclerotic heart disease of native coronary artery without angina pectoris

## 2024-12-27 NOTE — PROGRESS NOTE ADULT - ASSESSMENT
Mr. Moya is a 84-year-old Male with a history of myelodysplastic syndrome on chemo (last session 12/18), CAD, CKD, HTN, HLD, who presents with positive blood cultures for Stenotrophomonas maltophilia on 12/18/24, s/p negative JEAN pending disposition  Mr. Moya is a 84-year-old Male with a history of myelodysplastic syndrome on chemo (last session 12/18), CAD, CKD, HTN, HLD, HFrEF who presents with positive blood cultures for Stenotrophomonas maltophilia on 12/18/24, s/p negative JEAN pending disposition

## 2024-12-27 NOTE — PROGRESS NOTE ADULT - PROBLEM SELECTOR PLAN 1
Pt had two separate blood cultures that were positive Stenotrophomonas maltophilia. He previously was tested positive for this in June 2024 and was also positive for MRSE. He was treated with vancomycin--> zosyn --> minocyline --> bactrim + minocycline and was seen by ID at the time for treatment plan.  Likely 2/2 PICC line which has now been removed    Plan  - c/w bactrim DS TID (minocycline d/ricardo on 12/27)  - f/u ID recs  - TTE/JEAN: no vegetations  - BCx negative at 72H  - PICC placed 12/26, f/u CXR to confirm placement

## 2024-12-27 NOTE — PROGRESS NOTE ADULT - PROBLEM SELECTOR PROBLEM 8
Inflammatory disease of prostate, unspecified
Inflammatory disease of prostate, unspecified
Atherosclerotic heart disease of native coronary artery without angina pectoris
Inflammatory disease of prostate, unspecified

## 2024-12-27 NOTE — PROGRESS NOTE ADULT - PROBLEM SELECTOR PROBLEM 5
Chronic kidney disease, stage 3b
Chronic kidney disease, stage 3b
Myelodysplastic syndrome
Chronic kidney disease, stage 3b

## 2024-12-27 NOTE — PROGRESS NOTE ADULT - PROBLEM SELECTOR PLAN 9
Plan  - C/w Amlodipine  - CTM
Plan  - continue with home meds
Plan  - C/w Amlodipine  - CTM

## 2024-12-27 NOTE — DISCHARGE NOTE NURSING/CASE MANAGEMENT/SOCIAL WORK - NSSCCARECORD_GEN_ALL_CORE
[facility]

## 2024-12-27 NOTE — PROGRESS NOTE ADULT - SUBJECTIVE AND OBJECTIVE BOX
SUBJECTIVE / OVERNIGHT EVENTS:  Pt seen and examined at bedside. SILVANO.    MEDICATIONS  (STANDING):  acyclovir   Oral Tab/Cap 400 milliGRAM(s) Oral two times a day  allopurinol 200 milliGRAM(s) Oral daily  aspirin  chewable 81 milliGRAM(s) Oral daily  cadexomer iodine 0.9% Gel 1 Application(s) Topical daily  chlorhexidine 4% Liquid 1 Application(s) Topical daily  clopidogrel Tablet 75 milliGRAM(s) Oral daily  fluconAZOLE   Tablet 200 milliGRAM(s) Oral daily  gabapentin 300 milliGRAM(s) Oral two times a day  heparin   Injectable 5000 Unit(s) SubCutaneous every 8 hours  influenza  Vaccine (HIGH DOSE) 0.5 milliLiter(s) IntraMuscular once  minocycline 200 milliGRAM(s) Oral two times a day  pantoprazole    Tablet 40 milliGRAM(s) Oral before breakfast  polyethylene glycol 3350 17 Gram(s) Oral two times a day  rosuvastatin 10 milliGRAM(s) Oral at bedtime  senna 2 Tablet(s) Oral at bedtime  sertraline 25 milliGRAM(s) Oral daily  sevelamer carbonate 800 milliGRAM(s) Oral <User Schedule>  sodium bicarbonate 650 milliGRAM(s) Oral three times a day  trimethoprim  160 mG/sulfamethoxazole 800 mG 1 Tablet(s) Oral every 8 hours    MEDICATIONS  (PRN):          PHYSICAL EXAM:  Vital Signs Last 24 Hrs  T(C): 36.8 (27 Dec 2024 04:00), Max: 36.9 (26 Dec 2024 09:00)  T(F): 98.2 (27 Dec 2024 04:00), Max: 98.4 (26 Dec 2024 09:00)  HR: 81 (27 Dec 2024 04:00) (81 - 100)  BP: 147/92 (27 Dec 2024 04:00) (121/75 - 155/97)  BP(mean): --  RR: 18 (27 Dec 2024 04:00) (14 - 18)  SpO2: 98% (27 Dec 2024 04:00) (95% - 99%)    Parameters below as of 27 Dec 2024 04:00  Patient On (Oxygen Delivery Method): room air        CAPILLARY BLOOD GLUCOSE      POCT Blood Glucose.: 97 mg/dL (26 Dec 2024 08:40)    I&O's Summary    GENERAL: NAD, lying comfortably in bed  HEENT: NCAT, EOMI, conjunctiva/sclera clear  HEART: RRR; no murmurs, rubs, gallops.   RESPIRATORY: CTA B/L, no W/R/R  ABDOMEN: Soft, Nontender, Nondistended, last BM 12/20 per pt  NEUROLOGY: grossly A&O, nonfocal, moving all extremities  EXTREMITIES: +lesions b/l LE, unchanged from prior exam. 2+ Peripheral Pulses, No clubbing, cyanosis, or edema  SKIN: warm, dry, normal color, no rash or abnormal lesions     LABS:                        11.3   7.82  )-----------( 160      ( 27 Dec 2024 06:01 )             34.0     12-27    133[L]  |  102  |  42[H]  ----------------------------<  82  5.0   |  16[L]  |  2.39[H]    Ca    8.8      27 Dec 2024 06:01  Phos  4.9     12-27  Mg     2.5     12-27    TPro  6.8  /  Alb  3.9  /  TBili  0.3  /  DBili  x   /  AST  32  /  ALT  31  /  AlkPhos  161[H]  12-27    PT/INR - ( 26 Dec 2024 04:16 )   PT: 12.6 sec;   INR: 1.10 ratio         PTT - ( 26 Dec 2024 04:16 )  PTT:32.0 sec      Urinalysis Basic - ( 27 Dec 2024 06:01 )    Color: x / Appearance: x / SG: x / pH: x  Gluc: 82 mg/dL / Ketone: x  / Bili: x / Urobili: x   Blood: x / Protein: x / Nitrite: x   Leuk Esterase: x / RBC: x / WBC x   Sq Epi: x / Non Sq Epi: x / Bacteria: x          IMAGING:    [X] All pertinent imaging reviewed by me SUBJECTIVE / OVERNIGHT EVENTS:  Pt seen and examined at bedside. CASSANDRAEON. Feels fine.    MEDICATIONS  (STANDING):  acyclovir   Oral Tab/Cap 400 milliGRAM(s) Oral two times a day  allopurinol 200 milliGRAM(s) Oral daily  aspirin  chewable 81 milliGRAM(s) Oral daily  cadexomer iodine 0.9% Gel 1 Application(s) Topical daily  chlorhexidine 4% Liquid 1 Application(s) Topical daily  clopidogrel Tablet 75 milliGRAM(s) Oral daily  fluconAZOLE   Tablet 200 milliGRAM(s) Oral daily  gabapentin 300 milliGRAM(s) Oral two times a day  heparin   Injectable 5000 Unit(s) SubCutaneous every 8 hours  influenza  Vaccine (HIGH DOSE) 0.5 milliLiter(s) IntraMuscular once  minocycline 200 milliGRAM(s) Oral two times a day  pantoprazole    Tablet 40 milliGRAM(s) Oral before breakfast  polyethylene glycol 3350 17 Gram(s) Oral two times a day  rosuvastatin 10 milliGRAM(s) Oral at bedtime  senna 2 Tablet(s) Oral at bedtime  sertraline 25 milliGRAM(s) Oral daily  sevelamer carbonate 800 milliGRAM(s) Oral <User Schedule>  sodium bicarbonate 650 milliGRAM(s) Oral three times a day  trimethoprim  160 mG/sulfamethoxazole 800 mG 1 Tablet(s) Oral every 8 hours    MEDICATIONS  (PRN):          PHYSICAL EXAM:  Vital Signs Last 24 Hrs  T(C): 36.8 (27 Dec 2024 04:00), Max: 36.9 (26 Dec 2024 09:00)  T(F): 98.2 (27 Dec 2024 04:00), Max: 98.4 (26 Dec 2024 09:00)  HR: 81 (27 Dec 2024 04:00) (81 - 100)  BP: 147/92 (27 Dec 2024 04:00) (121/75 - 155/97)  BP(mean): --  RR: 18 (27 Dec 2024 04:00) (14 - 18)  SpO2: 98% (27 Dec 2024 04:00) (95% - 99%)    Parameters below as of 27 Dec 2024 04:00  Patient On (Oxygen Delivery Method): room air        CAPILLARY BLOOD GLUCOSE      POCT Blood Glucose.: 97 mg/dL (26 Dec 2024 08:40)    I&O's Summary    GENERAL: NAD, lying comfortably in bed  HEENT: NCAT, EOMI, conjunctiva/sclera clear  HEART: RRR; no murmurs, rubs, gallops.   RESPIRATORY: CTA B/L, no W/R/R  ABDOMEN: Soft, Nontender, Nondistended, last BM 12/20 per pt  NEUROLOGY: grossly A&O, nonfocal, moving all extremities  EXTREMITIES: +lesions b/l LE, unchanged from prior exam. 2+ Peripheral Pulses, No clubbing, cyanosis, or edema  SKIN: warm, dry, normal color, no rash or abnormal lesions     LABS:                        11.3   7.82  )-----------( 160      ( 27 Dec 2024 06:01 )             34.0     12-27    133[L]  |  102  |  42[H]  ----------------------------<  82  5.0   |  16[L]  |  2.39[H]    Ca    8.8      27 Dec 2024 06:01  Phos  4.9     12-27  Mg     2.5     12-27    TPro  6.8  /  Alb  3.9  /  TBili  0.3  /  DBili  x   /  AST  32  /  ALT  31  /  AlkPhos  161[H]  12-27    PT/INR - ( 26 Dec 2024 04:16 )   PT: 12.6 sec;   INR: 1.10 ratio         PTT - ( 26 Dec 2024 04:16 )  PTT:32.0 sec      Urinalysis Basic - ( 27 Dec 2024 06:01 )    Color: x / Appearance: x / SG: x / pH: x  Gluc: 82 mg/dL / Ketone: x  / Bili: x / Urobili: x   Blood: x / Protein: x / Nitrite: x   Leuk Esterase: x / RBC: x / WBC x   Sq Epi: x / Non Sq Epi: x / Bacteria: x          IMAGING:    [X] All pertinent imaging reviewed by me

## 2024-12-27 NOTE — PROGRESS NOTE ADULT - PROBLEM SELECTOR PLAN 3
Pt with chronic UTI, found to have prior UCx positive for E. faecalis, E. Coli   - Abx as per above
Pt reports pain the left ankle. X-ray was completed and did not demonstrate fracture.    Plan  - Per Podiatry, recommended Iodosorb to be applied to the right foot eschar  - Wound care orders placed per nursing  - No podiatric surgical intervention secondary to no acute signs of infection  - Appreciate podiatry recs

## 2024-12-27 NOTE — PROGRESS NOTE ADULT - PROBLEM SELECTOR PLAN 7
Plan  - continue with rosuvastatin, aspirin
Plan  - c/w gabapentin 300mg TID
Plan  - continue with rosuvastatin, aspirin

## 2024-12-27 NOTE — PROGRESS NOTE ADULT - REASON FOR ADMISSION
abnormal lab finding

## 2024-12-27 NOTE — PROGRESS NOTE ADULT - PROBLEM SELECTOR PROBLEM 10
Prophylactic measure
Essential (primary) hypertension
Prophylactic measure

## 2024-12-27 NOTE — PROGRESS NOTE ADULT - PROBLEM SELECTOR PROBLEM 3
Frequent UTI
Frequent UTI
Left ankle pain
Frequent UTI

## 2024-12-27 NOTE — DISCHARGE NOTE NURSING/CASE MANAGEMENT/SOCIAL WORK - FINANCIAL ASSISTANCE
[facility] provides services at a reduced cost to those who are determined to be eligible through [facility]’s financial assistance program. Information regarding [facility]’s financial assistance program can be found by going to [email or web address] or by calling [phone].

## 2024-12-27 NOTE — PROGRESS NOTE ADULT - PROBLEM SELECTOR PLAN 6
Plan  - Renally dose all medications  - Avoid nephrotoxins, NSAIDS Plan  - Renally dose all medications  - Avoid nephrotoxins, NSAIDS  - c/w sodium bicarb, sevelamer (started 12/26)

## 2025-01-06 ENCOUNTER — APPOINTMENT (OUTPATIENT)
Dept: HEMATOLOGY ONCOLOGY | Facility: CLINIC | Age: 85
End: 2025-01-06

## 2025-01-09 ENCOUNTER — NON-APPOINTMENT (OUTPATIENT)
Age: 85
End: 2025-01-09

## 2025-01-10 ENCOUNTER — RESULT REVIEW (OUTPATIENT)
Age: 85
End: 2025-01-10

## 2025-01-10 ENCOUNTER — APPOINTMENT (OUTPATIENT)
Dept: HEMATOLOGY ONCOLOGY | Facility: CLINIC | Age: 85
End: 2025-01-10
Payer: MEDICARE

## 2025-01-10 ENCOUNTER — NON-APPOINTMENT (OUTPATIENT)
Age: 85
End: 2025-01-10

## 2025-01-10 VITALS
HEIGHT: 67 IN | SYSTOLIC BLOOD PRESSURE: 146 MMHG | BODY MASS INDEX: 28.88 KG/M2 | RESPIRATION RATE: 17 BRPM | HEART RATE: 84 BPM | OXYGEN SATURATION: 99 % | WEIGHT: 184 LBS | TEMPERATURE: 98.8 F | DIASTOLIC BLOOD PRESSURE: 76 MMHG

## 2025-01-10 DIAGNOSIS — D61.818 OTHER PANCYTOPENIA: ICD-10-CM

## 2025-01-10 DIAGNOSIS — D46.C MYELODYSPLASTIC SYNDROME WITH ISOLATED DEL(5Q) CHROMOSOMAL ABNORMALITY: ICD-10-CM

## 2025-01-10 DIAGNOSIS — D64.9 ANEMIA, UNSPECIFIED: ICD-10-CM

## 2025-01-10 DIAGNOSIS — D53.9 NUTRITIONAL ANEMIA, UNSPECIFIED: ICD-10-CM

## 2025-01-10 DIAGNOSIS — Z51.11 ENCOUNTER FOR ANTINEOPLASTIC CHEMOTHERAPY: ICD-10-CM

## 2025-01-10 LAB
ALBUMIN SERPL ELPH-MCNC: 3.9 G/DL
ALP BLD-CCNC: 139 U/L
ALT SERPL-CCNC: 27 U/L
ANION GAP SERPL CALC-SCNC: 13 MMOL/L
AST SERPL-CCNC: 19 U/L
BASOPHILS # BLD AUTO: 0.06 K/UL — SIGNIFICANT CHANGE UP (ref 0–0.2)
BASOPHILS NFR BLD AUTO: 1.3 % — SIGNIFICANT CHANGE UP (ref 0–2)
BILIRUB SERPL-MCNC: 0.4 MG/DL
BUN SERPL-MCNC: 19 MG/DL
CALCIUM SERPL-MCNC: 8.8 MG/DL
CHLORIDE SERPL-SCNC: 103 MMOL/L
CO2 SERPL-SCNC: 26 MMOL/L
CREAT SERPL-MCNC: 2.23 MG/DL
EGFR: 29 ML/MIN/1.73M2
EOSINOPHIL # BLD AUTO: 0.14 K/UL — SIGNIFICANT CHANGE UP (ref 0–0.5)
EOSINOPHIL NFR BLD AUTO: 3.1 % — SIGNIFICANT CHANGE UP (ref 0–6)
GLUCOSE SERPL-MCNC: 117 MG/DL
HCT VFR BLD CALC: 31.7 % — LOW (ref 39–50)
HGB BLD-MCNC: 10.4 G/DL — LOW (ref 13–17)
IMM GRANULOCYTES NFR BLD AUTO: 4.6 % — HIGH (ref 0–0.9)
LDH SERPL-CCNC: 224 U/L
LYMPHOCYTES # BLD AUTO: 1.16 K/UL — SIGNIFICANT CHANGE UP (ref 1–3.3)
LYMPHOCYTES # BLD AUTO: 25.4 % — SIGNIFICANT CHANGE UP (ref 13–44)
MCHC RBC-ENTMCNC: 32.8 G/DL — SIGNIFICANT CHANGE UP (ref 32–36)
MCHC RBC-ENTMCNC: 38.2 PG — HIGH (ref 27–34)
MCV RBC AUTO: 116.5 FL — HIGH (ref 80–100)
MONOCYTES # BLD AUTO: 0.32 K/UL — SIGNIFICANT CHANGE UP (ref 0–0.9)
MONOCYTES NFR BLD AUTO: 7 % — SIGNIFICANT CHANGE UP (ref 2–14)
NEUTROPHILS # BLD AUTO: 2.67 K/UL — SIGNIFICANT CHANGE UP (ref 1.8–7.4)
NEUTROPHILS NFR BLD AUTO: 58.6 % — SIGNIFICANT CHANGE UP (ref 43–77)
NRBC # BLD: 0 /100 WBCS — SIGNIFICANT CHANGE UP (ref 0–0)
NRBC BLD-RTO: 0 /100 WBCS — SIGNIFICANT CHANGE UP (ref 0–0)
PLATELET # BLD AUTO: 161 K/UL — SIGNIFICANT CHANGE UP (ref 150–400)
POTASSIUM SERPL-SCNC: 4.6 MMOL/L
PROT SERPL-MCNC: 7 G/DL
RBC # BLD: 2.72 M/UL — LOW (ref 4.2–5.8)
RBC # FLD: 20.3 % — HIGH (ref 10.3–14.5)
SODIUM SERPL-SCNC: 142 MMOL/L
URATE SERPL-MCNC: 5.7 MG/DL
WBC # BLD: 4.56 K/UL — SIGNIFICANT CHANGE UP (ref 3.8–10.5)
WBC # FLD AUTO: 4.56 K/UL — SIGNIFICANT CHANGE UP (ref 3.8–10.5)

## 2025-01-10 PROCEDURE — 99214 OFFICE O/P EST MOD 30 MIN: CPT

## 2025-01-13 ENCOUNTER — RESULT REVIEW (OUTPATIENT)
Age: 85
End: 2025-01-13

## 2025-01-13 ENCOUNTER — APPOINTMENT (OUTPATIENT)
Dept: HEMATOLOGY ONCOLOGY | Facility: CLINIC | Age: 85
End: 2025-01-13

## 2025-01-13 ENCOUNTER — TRANSCRIPTION ENCOUNTER (OUTPATIENT)
Age: 85
End: 2025-01-13

## 2025-01-13 ENCOUNTER — APPOINTMENT (OUTPATIENT)
Dept: INFUSION THERAPY | Facility: HOSPITAL | Age: 85
End: 2025-01-13

## 2025-01-13 LAB
ALBUMIN SERPL ELPH-MCNC: 3.7 G/DL — SIGNIFICANT CHANGE UP (ref 3.3–5)
ALP SERPL-CCNC: 121 U/L — HIGH (ref 40–120)
ALT FLD-CCNC: 20 U/L — SIGNIFICANT CHANGE UP (ref 10–45)
ANION GAP SERPL CALC-SCNC: 16 MMOL/L — SIGNIFICANT CHANGE UP (ref 5–17)
AST SERPL-CCNC: 30 U/L — SIGNIFICANT CHANGE UP (ref 10–40)
BASOPHILS # BLD AUTO: 0 K/UL — SIGNIFICANT CHANGE UP (ref 0–0.2)
BASOPHILS NFR BLD AUTO: 0 % — SIGNIFICANT CHANGE UP (ref 0–2)
BILIRUB SERPL-MCNC: 0.5 MG/DL — SIGNIFICANT CHANGE UP (ref 0.2–1.2)
BUN SERPL-MCNC: 22 MG/DL — SIGNIFICANT CHANGE UP (ref 7–23)
CALCIUM SERPL-MCNC: 8.7 MG/DL — SIGNIFICANT CHANGE UP (ref 8.4–10.5)
CHLORIDE SERPL-SCNC: 102 MMOL/L — SIGNIFICANT CHANGE UP (ref 96–108)
CO2 SERPL-SCNC: 22 MMOL/L — SIGNIFICANT CHANGE UP (ref 22–31)
CREAT SERPL-MCNC: 1.71 MG/DL — HIGH (ref 0.5–1.3)
EGFR: 39 ML/MIN/1.73M2 — LOW
EOSINOPHIL # BLD AUTO: 0.13 K/UL — SIGNIFICANT CHANGE UP (ref 0–0.5)
EOSINOPHIL NFR BLD AUTO: 2 % — SIGNIFICANT CHANGE UP (ref 0–6)
GLUCOSE SERPL-MCNC: 116 MG/DL — HIGH (ref 70–99)
HCT VFR BLD CALC: 30.2 % — LOW (ref 39–50)
HGB BLD-MCNC: 10 G/DL — LOW (ref 13–17)
LDH SERPL L TO P-CCNC: 300 U/L — HIGH (ref 50–242)
LYMPHOCYTES # BLD AUTO: 1.13 K/UL — SIGNIFICANT CHANGE UP (ref 1–3.3)
LYMPHOCYTES # BLD AUTO: 18 % — SIGNIFICANT CHANGE UP (ref 13–44)
MCHC RBC-ENTMCNC: 33.1 G/DL — SIGNIFICANT CHANGE UP (ref 32–36)
MCHC RBC-ENTMCNC: 37.6 PG — HIGH (ref 27–34)
MCV RBC AUTO: 113.5 FL — HIGH (ref 80–100)
MONOCYTES # BLD AUTO: 0.44 K/UL — SIGNIFICANT CHANGE UP (ref 0–0.9)
MONOCYTES NFR BLD AUTO: 7 % — SIGNIFICANT CHANGE UP (ref 2–14)
MYELOCYTES NFR BLD: 2 % — HIGH (ref 0–0)
NEUTROPHILS # BLD AUTO: 4.44 K/UL — SIGNIFICANT CHANGE UP (ref 1.8–7.4)
NEUTROPHILS NFR BLD AUTO: 71 % — SIGNIFICANT CHANGE UP (ref 43–77)
NRBC # BLD: 0 /100 WBCS — SIGNIFICANT CHANGE UP (ref 0–0)
NRBC # BLD: SIGNIFICANT CHANGE UP /100 WBCS (ref 0–0)
NRBC BLD-RTO: 0 /100 WBCS — SIGNIFICANT CHANGE UP (ref 0–0)
NRBC BLD-RTO: SIGNIFICANT CHANGE UP /100 WBCS (ref 0–0)
PLAT MORPH BLD: NORMAL — SIGNIFICANT CHANGE UP
PLATELET # BLD AUTO: 216 K/UL — SIGNIFICANT CHANGE UP (ref 150–400)
POTASSIUM SERPL-MCNC: 4.5 MMOL/L — SIGNIFICANT CHANGE UP (ref 3.5–5.3)
POTASSIUM SERPL-SCNC: 4.5 MMOL/L — SIGNIFICANT CHANGE UP (ref 3.5–5.3)
PROT SERPL-MCNC: 7.1 G/DL — SIGNIFICANT CHANGE UP (ref 6–8.3)
RBC # BLD: 2.66 M/UL — LOW (ref 4.2–5.8)
RBC # FLD: 19.9 % — HIGH (ref 10.3–14.5)
RBC BLD AUTO: SIGNIFICANT CHANGE UP
SODIUM SERPL-SCNC: 139 MMOL/L — SIGNIFICANT CHANGE UP (ref 135–145)
WBC # BLD: 6.25 K/UL — SIGNIFICANT CHANGE UP (ref 3.8–10.5)
WBC # FLD AUTO: 6.25 K/UL — SIGNIFICANT CHANGE UP (ref 3.8–10.5)

## 2025-01-14 ENCOUNTER — APPOINTMENT (OUTPATIENT)
Dept: INFUSION THERAPY | Facility: HOSPITAL | Age: 85
End: 2025-01-14

## 2025-01-14 ENCOUNTER — NON-APPOINTMENT (OUTPATIENT)
Age: 85
End: 2025-01-14

## 2025-01-15 ENCOUNTER — APPOINTMENT (OUTPATIENT)
Dept: INFUSION THERAPY | Facility: HOSPITAL | Age: 85
End: 2025-01-15

## 2025-01-16 ENCOUNTER — APPOINTMENT (OUTPATIENT)
Dept: INFUSION THERAPY | Facility: HOSPITAL | Age: 85
End: 2025-01-16

## 2025-01-17 ENCOUNTER — APPOINTMENT (OUTPATIENT)
Dept: MRI IMAGING | Facility: IMAGING CENTER | Age: 85
End: 2025-01-17

## 2025-01-17 ENCOUNTER — APPOINTMENT (OUTPATIENT)
Dept: INFUSION THERAPY | Facility: HOSPITAL | Age: 85
End: 2025-01-17

## 2025-01-17 ENCOUNTER — OUTPATIENT (OUTPATIENT)
Dept: OUTPATIENT SERVICES | Facility: HOSPITAL | Age: 85
LOS: 1 days | End: 2025-01-17
Payer: MEDICARE

## 2025-01-17 DIAGNOSIS — Z90.79 ACQUIRED ABSENCE OF OTHER GENITAL ORGAN(S): Chronic | ICD-10-CM

## 2025-01-17 DIAGNOSIS — Z98.890 OTHER SPECIFIED POSTPROCEDURAL STATES: Chronic | ICD-10-CM

## 2025-01-17 DIAGNOSIS — Z98.49 CATARACT EXTRACTION STATUS, UNSPECIFIED EYE: Chronic | ICD-10-CM

## 2025-01-17 DIAGNOSIS — Z86.79 PERSONAL HISTORY OF OTHER DISEASES OF THE CIRCULATORY SYSTEM: Chronic | ICD-10-CM

## 2025-01-17 DIAGNOSIS — Z00.8 ENCOUNTER FOR OTHER GENERAL EXAMINATION: ICD-10-CM

## 2025-01-17 PROCEDURE — 72148 MRI LUMBAR SPINE W/O DYE: CPT | Mod: 26

## 2025-01-17 PROCEDURE — 72148 MRI LUMBAR SPINE W/O DYE: CPT

## 2025-01-17 PROCEDURE — A9585: CPT

## 2025-01-21 ENCOUNTER — APPOINTMENT (OUTPATIENT)
Dept: HEMATOLOGY ONCOLOGY | Facility: CLINIC | Age: 85
End: 2025-01-21

## 2025-01-21 ENCOUNTER — APPOINTMENT (OUTPATIENT)
Dept: INFUSION THERAPY | Facility: HOSPITAL | Age: 85
End: 2025-01-21

## 2025-01-22 ENCOUNTER — APPOINTMENT (OUTPATIENT)
Dept: INFUSION THERAPY | Facility: HOSPITAL | Age: 85
End: 2025-01-22

## 2025-01-23 ENCOUNTER — APPOINTMENT (OUTPATIENT)
Dept: INFUSION THERAPY | Facility: HOSPITAL | Age: 85
End: 2025-01-23

## 2025-01-23 ENCOUNTER — APPOINTMENT (OUTPATIENT)
Dept: INTERNAL MEDICINE | Facility: CLINIC | Age: 85
End: 2025-01-23

## 2025-01-24 ENCOUNTER — APPOINTMENT (OUTPATIENT)
Dept: INFUSION THERAPY | Facility: HOSPITAL | Age: 85
End: 2025-01-24

## 2025-01-24 ENCOUNTER — APPOINTMENT (OUTPATIENT)
Dept: HEMATOLOGY ONCOLOGY | Facility: CLINIC | Age: 85
End: 2025-01-24

## 2025-01-25 ENCOUNTER — APPOINTMENT (OUTPATIENT)
Dept: INFUSION THERAPY | Facility: HOSPITAL | Age: 85
End: 2025-01-25

## 2025-01-27 ENCOUNTER — APPOINTMENT (OUTPATIENT)
Dept: INFUSION THERAPY | Facility: HOSPITAL | Age: 85
End: 2025-01-27

## 2025-02-07 ENCOUNTER — RX RENEWAL (OUTPATIENT)
Age: 85
End: 2025-02-07

## 2025-02-12 ENCOUNTER — RX RENEWAL (OUTPATIENT)
Age: 85
End: 2025-02-12

## 2025-02-24 ENCOUNTER — APPOINTMENT (OUTPATIENT)
Dept: CARDIOLOGY | Facility: CLINIC | Age: 85
End: 2025-02-24
Payer: MEDICARE

## 2025-02-24 ENCOUNTER — NON-APPOINTMENT (OUTPATIENT)
Age: 85
End: 2025-02-24

## 2025-02-24 VITALS
SYSTOLIC BLOOD PRESSURE: 120 MMHG | DIASTOLIC BLOOD PRESSURE: 82 MMHG | OXYGEN SATURATION: 100 % | BODY MASS INDEX: 28.82 KG/M2 | WEIGHT: 184 LBS | HEART RATE: 104 BPM

## 2025-02-24 DIAGNOSIS — D64.9 ANEMIA, UNSPECIFIED: ICD-10-CM

## 2025-02-24 DIAGNOSIS — I35.0 NONRHEUMATIC AORTIC (VALVE) STENOSIS: ICD-10-CM

## 2025-02-24 PROCEDURE — G2211 COMPLEX E/M VISIT ADD ON: CPT

## 2025-02-24 PROCEDURE — 99214 OFFICE O/P EST MOD 30 MIN: CPT

## 2025-02-25 ENCOUNTER — LABORATORY RESULT (OUTPATIENT)
Age: 85
End: 2025-02-25

## 2025-02-26 DIAGNOSIS — R30.0 DYSURIA: ICD-10-CM

## 2025-02-27 ENCOUNTER — LABORATORY RESULT (OUTPATIENT)
Age: 85
End: 2025-02-27

## 2025-02-27 ENCOUNTER — OUTPATIENT (OUTPATIENT)
Dept: OUTPATIENT SERVICES | Facility: HOSPITAL | Age: 85
LOS: 1 days | Discharge: HOME | End: 2025-02-27

## 2025-02-27 DIAGNOSIS — Z98.890 OTHER SPECIFIED POSTPROCEDURAL STATES: Chronic | ICD-10-CM

## 2025-02-27 DIAGNOSIS — Z98.49 CATARACT EXTRACTION STATUS, UNSPECIFIED EYE: Chronic | ICD-10-CM

## 2025-02-27 DIAGNOSIS — D46.9 MYELODYSPLASTIC SYNDROME, UNSPECIFIED: ICD-10-CM

## 2025-02-27 DIAGNOSIS — Z90.79 ACQUIRED ABSENCE OF OTHER GENITAL ORGAN(S): Chronic | ICD-10-CM

## 2025-02-27 DIAGNOSIS — D64.9 ANEMIA, UNSPECIFIED: ICD-10-CM

## 2025-03-03 ENCOUNTER — RESULT REVIEW (OUTPATIENT)
Age: 85
End: 2025-03-03

## 2025-03-03 ENCOUNTER — APPOINTMENT (OUTPATIENT)
Dept: HEMATOLOGY ONCOLOGY | Facility: CLINIC | Age: 85
End: 2025-03-03

## 2025-03-03 ENCOUNTER — APPOINTMENT (OUTPATIENT)
Dept: INFUSION THERAPY | Facility: HOSPITAL | Age: 85
End: 2025-03-03

## 2025-03-03 LAB
ALBUMIN SERPL ELPH-MCNC: 3.6 G/DL — SIGNIFICANT CHANGE UP (ref 3.3–5)
ALP SERPL-CCNC: 111 U/L — SIGNIFICANT CHANGE UP (ref 40–120)
ALT FLD-CCNC: 17 U/L — SIGNIFICANT CHANGE UP (ref 10–45)
ANION GAP SERPL CALC-SCNC: 16 MMOL/L — SIGNIFICANT CHANGE UP (ref 5–17)
AST SERPL-CCNC: 22 U/L — SIGNIFICANT CHANGE UP (ref 10–40)
BASOPHILS # BLD AUTO: 0.09 K/UL — SIGNIFICANT CHANGE UP (ref 0–0.2)
BASOPHILS NFR BLD AUTO: 0.9 % — SIGNIFICANT CHANGE UP (ref 0–2)
BILIRUB SERPL-MCNC: 0.3 MG/DL — SIGNIFICANT CHANGE UP (ref 0.2–1.2)
BUN SERPL-MCNC: 27 MG/DL — HIGH (ref 7–23)
CALCIUM SERPL-MCNC: 8.6 MG/DL — SIGNIFICANT CHANGE UP (ref 8.4–10.5)
CHLORIDE SERPL-SCNC: 103 MMOL/L — SIGNIFICANT CHANGE UP (ref 96–108)
CO2 SERPL-SCNC: 22 MMOL/L — SIGNIFICANT CHANGE UP (ref 22–31)
CREAT SERPL-MCNC: 1.75 MG/DL — HIGH (ref 0.5–1.3)
EGFR: 38 ML/MIN/1.73M2 — LOW
EGFR: 38 ML/MIN/1.73M2 — LOW
EOSINOPHIL # BLD AUTO: 0.29 K/UL — SIGNIFICANT CHANGE UP (ref 0–0.5)
EOSINOPHIL NFR BLD AUTO: 3 % — SIGNIFICANT CHANGE UP (ref 0–6)
GLUCOSE SERPL-MCNC: 249 MG/DL — HIGH (ref 70–99)
HCT VFR BLD CALC: 28.9 % — LOW (ref 39–50)
HGB BLD-MCNC: 10 G/DL — LOW (ref 13–17)
IMM GRANULOCYTES NFR BLD AUTO: 2 % — HIGH (ref 0–0.9)
LDH SERPL L TO P-CCNC: 314 U/L — HIGH (ref 50–242)
LYMPHOCYTES # BLD AUTO: 1.44 K/UL — SIGNIFICANT CHANGE UP (ref 1–3.3)
LYMPHOCYTES # BLD AUTO: 14.9 % — SIGNIFICANT CHANGE UP (ref 13–44)
MCHC RBC-ENTMCNC: 34.6 G/DL — SIGNIFICANT CHANGE UP (ref 32–36)
MCHC RBC-ENTMCNC: 37 PG — HIGH (ref 27–34)
MCV RBC AUTO: 107 FL — HIGH (ref 80–100)
MONOCYTES # BLD AUTO: 0.68 K/UL — SIGNIFICANT CHANGE UP (ref 0–0.9)
MONOCYTES NFR BLD AUTO: 7 % — SIGNIFICANT CHANGE UP (ref 2–14)
NEUTROPHILS # BLD AUTO: 6.98 K/UL — SIGNIFICANT CHANGE UP (ref 1.8–7.4)
NEUTROPHILS NFR BLD AUTO: 72.2 % — SIGNIFICANT CHANGE UP (ref 43–77)
NRBC BLD AUTO-RTO: 0 /100 WBCS — SIGNIFICANT CHANGE UP (ref 0–0)
PLATELET # BLD AUTO: 259 K/UL — SIGNIFICANT CHANGE UP (ref 150–400)
POTASSIUM SERPL-MCNC: 3.3 MMOL/L — LOW (ref 3.5–5.3)
POTASSIUM SERPL-SCNC: 3.3 MMOL/L — LOW (ref 3.5–5.3)
PROT SERPL-MCNC: 6.9 G/DL — SIGNIFICANT CHANGE UP (ref 6–8.3)
RBC # BLD: 2.7 M/UL — LOW (ref 4.2–5.8)
RBC # FLD: 17.4 % — HIGH (ref 10.3–14.5)
SODIUM SERPL-SCNC: 140 MMOL/L — SIGNIFICANT CHANGE UP (ref 135–145)
WBC # BLD: 9.67 K/UL — SIGNIFICANT CHANGE UP (ref 3.8–10.5)
WBC # FLD AUTO: 9.67 K/UL — SIGNIFICANT CHANGE UP (ref 3.8–10.5)

## 2025-03-04 ENCOUNTER — APPOINTMENT (OUTPATIENT)
Dept: INFUSION THERAPY | Facility: HOSPITAL | Age: 85
End: 2025-03-04

## 2025-03-04 DIAGNOSIS — R11.2 NAUSEA WITH VOMITING, UNSPECIFIED: ICD-10-CM

## 2025-03-04 DIAGNOSIS — Z51.11 ENCOUNTER FOR ANTINEOPLASTIC CHEMOTHERAPY: ICD-10-CM

## 2025-03-05 ENCOUNTER — APPOINTMENT (OUTPATIENT)
Dept: INFUSION THERAPY | Facility: HOSPITAL | Age: 85
End: 2025-03-05

## 2025-03-06 ENCOUNTER — APPOINTMENT (OUTPATIENT)
Dept: INFUSION THERAPY | Facility: HOSPITAL | Age: 85
End: 2025-03-06

## 2025-03-06 NOTE — CONSULT NOTE ADULT - ATTENDING COMMENTS
Patient seen at bedside. Case discussed with Dr Lawson. Plan as above.     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 cMmahon), Stage II diastolic dysfunction, HTN, HLD, Prostate cancer - s/p Prostatectomy, Carotid artery stenosis - s/p Endarterectomy, PAD, Renal artery stenosis, and CKD, and presented to the ED, after being sent for new onset atrial fibrillation.  Found to be anemic and FOBT+  Cardiology and GI on board  Hold Lenalidomide   Will follow Unknown

## 2025-03-07 ENCOUNTER — APPOINTMENT (OUTPATIENT)
Dept: INFUSION THERAPY | Facility: HOSPITAL | Age: 85
End: 2025-03-07

## 2025-03-12 ENCOUNTER — APPOINTMENT (OUTPATIENT)
Dept: INTERNAL MEDICINE | Facility: CLINIC | Age: 85
End: 2025-03-12

## 2025-03-12 ENCOUNTER — TRANSCRIPTION ENCOUNTER (OUTPATIENT)
Age: 85
End: 2025-03-12

## 2025-03-12 ENCOUNTER — NON-APPOINTMENT (OUTPATIENT)
Age: 85
End: 2025-03-12

## 2025-03-12 VITALS
WEIGHT: 184 LBS | HEART RATE: 80 BPM | HEIGHT: 67 IN | DIASTOLIC BLOOD PRESSURE: 72 MMHG | SYSTOLIC BLOOD PRESSURE: 134 MMHG | BODY MASS INDEX: 28.88 KG/M2 | OXYGEN SATURATION: 98 %

## 2025-03-12 DIAGNOSIS — N39.0 URINARY TRACT INFECTION, SITE NOT SPECIFIED: ICD-10-CM

## 2025-03-12 PROCEDURE — G2211 COMPLEX E/M VISIT ADD ON: CPT

## 2025-03-12 PROCEDURE — 99203 OFFICE O/P NEW LOW 30 MIN: CPT

## 2025-03-12 RX ORDER — AMOXICILLIN AND CLAVULANATE POTASSIUM 500; 125 MG/1; MG/1
500-125 TABLET, FILM COATED ORAL
Qty: 14 | Refills: 0 | Status: ACTIVE | COMMUNITY
Start: 2025-03-12 | End: 1900-01-01

## 2025-03-13 ENCOUNTER — APPOINTMENT (OUTPATIENT)
Dept: VASCULAR SURGERY | Facility: CLINIC | Age: 85
End: 2025-03-13
Payer: MEDICARE

## 2025-03-13 VITALS
HEART RATE: 112 BPM | HEIGHT: 67 IN | BODY MASS INDEX: 28.88 KG/M2 | TEMPERATURE: 97.5 F | WEIGHT: 184 LBS | SYSTOLIC BLOOD PRESSURE: 162 MMHG | DIASTOLIC BLOOD PRESSURE: 99 MMHG

## 2025-03-13 DIAGNOSIS — R60.0 LOCALIZED EDEMA: ICD-10-CM

## 2025-03-13 DIAGNOSIS — I73.9 PERIPHERAL VASCULAR DISEASE, UNSPECIFIED: ICD-10-CM

## 2025-03-13 PROCEDURE — 93922 UPR/L XTREMITY ART 2 LEVELS: CPT

## 2025-03-13 PROCEDURE — 99214 OFFICE O/P EST MOD 30 MIN: CPT

## 2025-03-17 ENCOUNTER — RX RENEWAL (OUTPATIENT)
Age: 85
End: 2025-03-17

## 2025-03-17 ENCOUNTER — TRANSCRIPTION ENCOUNTER (OUTPATIENT)
Age: 85
End: 2025-03-17

## 2025-03-25 DIAGNOSIS — R30.0 DYSURIA: ICD-10-CM

## 2025-03-26 ENCOUNTER — LABORATORY RESULT (OUTPATIENT)
Age: 85
End: 2025-03-26

## 2025-04-03 NOTE — H&P ADULT - ATTENDING SUPERVISION STATEMENT
Intubation    Date/Time: 4/3/2025 7:21 AM    Performed by: Adarsh Ledesma Jr., CRNA  Authorized by: Carlo Delaney MD    Intubation:     Induction:  Intravenous    Intubated:  Postinduction    Mask Ventilation:  Easy mask    Attempts:  1    Attempted By:  Student    Method of Intubation:  Video laryngoscopy    Blade:  Farias 3    Laryngeal View Grade: Grade I - full view of cords      Difficult Airway Encountered?: No      Complications:  None    Airway Device:  Direct and oral endotracheal tube    Airway Device Size:  7.5    Style/Cuff Inflation:  Cuffed (inflated to minimal occlusive pressure)    Inflation Amount (mL):  6    Tube secured:  22    Secured at:  The teeth    Placement Verified By:  Capnometry    Complicating Factors:  None    Findings Post-Intubation:  BS equal bilateral       Resident

## 2025-04-04 ENCOUNTER — TRANSCRIPTION ENCOUNTER (OUTPATIENT)
Age: 85
End: 2025-04-04

## 2025-04-04 RX ORDER — CIPROFLOXACIN HYDROCHLORIDE 500 MG/1
500 TABLET, FILM COATED ORAL
Qty: 28 | Refills: 0 | Status: ACTIVE | COMMUNITY
Start: 2025-04-04 | End: 1900-01-01

## 2025-04-10 ENCOUNTER — APPOINTMENT (OUTPATIENT)
Dept: UROLOGY | Facility: CLINIC | Age: 85
End: 2025-04-10
Payer: MEDICARE

## 2025-04-10 ENCOUNTER — RESULT REVIEW (OUTPATIENT)
Age: 85
End: 2025-04-10

## 2025-04-10 ENCOUNTER — RX RENEWAL (OUTPATIENT)
Age: 85
End: 2025-04-10

## 2025-04-10 ENCOUNTER — APPOINTMENT (OUTPATIENT)
Dept: HEMATOLOGY ONCOLOGY | Facility: CLINIC | Age: 85
End: 2025-04-10
Payer: MEDICARE

## 2025-04-10 VITALS
SYSTOLIC BLOOD PRESSURE: 156 MMHG | DIASTOLIC BLOOD PRESSURE: 85 MMHG | RESPIRATION RATE: 16 BRPM | TEMPERATURE: 98.2 F | HEART RATE: 84 BPM | OXYGEN SATURATION: 99 % | BODY MASS INDEX: 28.83 KG/M2 | WEIGHT: 184.09 LBS

## 2025-04-10 DIAGNOSIS — D64.9 ANEMIA, UNSPECIFIED: ICD-10-CM

## 2025-04-10 DIAGNOSIS — N39.0 URINARY TRACT INFECTION, SITE NOT SPECIFIED: ICD-10-CM

## 2025-04-10 DIAGNOSIS — D53.9 NUTRITIONAL ANEMIA, UNSPECIFIED: ICD-10-CM

## 2025-04-10 DIAGNOSIS — C61 MALIGNANT NEOPLASM OF PROSTATE: ICD-10-CM

## 2025-04-10 DIAGNOSIS — N18.30 CHRONIC KIDNEY DISEASE, STAGE 3 UNSPECIFIED: ICD-10-CM

## 2025-04-10 DIAGNOSIS — D46.C MYELODYSPLASTIC SYNDROME WITH ISOLATED DEL(5Q) CHROMOSOMAL ABNORMALITY: ICD-10-CM

## 2025-04-10 DIAGNOSIS — D61.818 OTHER PANCYTOPENIA: ICD-10-CM

## 2025-04-10 DIAGNOSIS — Z51.11 ENCOUNTER FOR ANTINEOPLASTIC CHEMOTHERAPY: ICD-10-CM

## 2025-04-10 DIAGNOSIS — N41.9 INFLAMMATORY DISEASE OF PROSTATE, UNSPECIFIED: ICD-10-CM

## 2025-04-10 LAB
ANISOCYTOSIS BLD QL: SLIGHT — SIGNIFICANT CHANGE UP
BASOPHILS # BLD AUTO: 0.07 K/UL — SIGNIFICANT CHANGE UP (ref 0–0.2)
BASOPHILS NFR BLD AUTO: 1 % — SIGNIFICANT CHANGE UP (ref 0–2)
DACRYOCYTES BLD QL SMEAR: SLIGHT — SIGNIFICANT CHANGE UP
ELLIPTOCYTES BLD QL SMEAR: SLIGHT — SIGNIFICANT CHANGE UP
EOSINOPHIL # BLD AUTO: 0.15 K/UL — SIGNIFICANT CHANGE UP (ref 0–0.5)
EOSINOPHIL NFR BLD AUTO: 2 % — SIGNIFICANT CHANGE UP (ref 0–6)
HCT VFR BLD CALC: 28.4 % — LOW (ref 39–50)
HGB BLD-MCNC: 9.4 G/DL — LOW (ref 13–17)
LYMPHOCYTES # BLD AUTO: 1.12 K/UL — SIGNIFICANT CHANGE UP (ref 1–3.3)
LYMPHOCYTES # BLD AUTO: 15 % — SIGNIFICANT CHANGE UP (ref 13–44)
MACROCYTES BLD QL: SIGNIFICANT CHANGE UP
MCHC RBC-ENTMCNC: 33.1 G/DL — SIGNIFICANT CHANGE UP (ref 32–36)
MCHC RBC-ENTMCNC: 35.2 PG — HIGH (ref 27–34)
MCV RBC AUTO: 106.4 FL — HIGH (ref 80–100)
METAMYELOCYTES # FLD: 0.5 % — HIGH (ref 0–0)
METAMYELOCYTES NFR BLD: 0.5 % — HIGH (ref 0–0)
MONOCYTES # BLD AUTO: 0.52 K/UL — SIGNIFICANT CHANGE UP (ref 0–0.9)
MONOCYTES NFR BLD AUTO: 7 % — SIGNIFICANT CHANGE UP (ref 2–14)
MYELOCYTES NFR BLD: 2.5 % — HIGH (ref 0–0)
NEUTROPHILS # BLD AUTO: 5.38 K/UL — SIGNIFICANT CHANGE UP (ref 1.8–7.4)
NEUTROPHILS NFR BLD AUTO: 72 % — SIGNIFICANT CHANGE UP (ref 43–77)
NRBC # BLD: 1 /100 WBCS — HIGH (ref 0–0)
NRBC BLD AUTO-RTO: SIGNIFICANT CHANGE UP /100 WBCS (ref 0–0)
NRBC BLD-RTO: 1 /100 WBCS — HIGH (ref 0–0)
PLAT MORPH BLD: NORMAL — SIGNIFICANT CHANGE UP
PLATELET # BLD AUTO: 307 K/UL — SIGNIFICANT CHANGE UP (ref 150–400)
POIKILOCYTOSIS BLD QL AUTO: SLIGHT — SIGNIFICANT CHANGE UP
RBC # BLD: 2.67 M/UL — LOW (ref 4.2–5.8)
RBC # FLD: 19.9 % — HIGH (ref 10.3–14.5)
RBC BLD AUTO: ABNORMAL
WBC # BLD: 7.47 K/UL — SIGNIFICANT CHANGE UP (ref 3.8–10.5)
WBC # FLD AUTO: 7.47 K/UL — SIGNIFICANT CHANGE UP (ref 3.8–10.5)

## 2025-04-10 PROCEDURE — 99214 OFFICE O/P EST MOD 30 MIN: CPT

## 2025-04-10 PROCEDURE — 51798 US URINE CAPACITY MEASURE: CPT

## 2025-04-10 PROCEDURE — 99204 OFFICE O/P NEW MOD 45 MIN: CPT | Mod: 25

## 2025-04-11 LAB
ANION GAP SERPL CALC-SCNC: 16 MMOL/L
APPEARANCE: ABNORMAL
BACTERIA: NEGATIVE /HPF
BILIRUBIN URINE: NEGATIVE
BLOOD URINE: NEGATIVE
BUN SERPL-MCNC: 26 MG/DL
CALCIUM SERPL-MCNC: 8.7 MG/DL
CAST: 14 /LPF
CHLORIDE SERPL-SCNC: 104 MMOL/L
CO2 SERPL-SCNC: 25 MMOL/L
COLOR: YELLOW
CREAT SERPL-MCNC: 2.18 MG/DL
EGFRCR SERPLBLD CKD-EPI 2021: 29 ML/MIN/1.73M2
EPITHELIAL CELLS: 1 /HPF
GLUCOSE QUALITATIVE U: NEGATIVE MG/DL
GLUCOSE SERPL-MCNC: 169 MG/DL
HCT VFR BLD CALC: 30.3 %
HGB BLD-MCNC: 9.8 G/DL
HYALINE CASTS: PRESENT
KETONES URINE: NEGATIVE MG/DL
LEUKOCYTE ESTERASE URINE: ABNORMAL
MCHC RBC-ENTMCNC: 32.3 G/DL
MCHC RBC-ENTMCNC: 34.6 PG
MCV RBC AUTO: 107.1 FL
MICROSCOPIC-UA: NORMAL
NITRITE URINE: NEGATIVE
PH URINE: 5
PLATELET # BLD AUTO: 362 K/UL
POTASSIUM SERPL-SCNC: 4.4 MMOL/L
PROTEIN URINE: 100 MG/DL
RBC # BLD: 2.83 M/UL
RBC # FLD: 20.2 %
RED BLOOD CELLS URINE: 0 /HPF
REVIEW: NORMAL
SODIUM SERPL-SCNC: 145 MMOL/L
SPECIFIC GRAVITY URINE: 1.02
UROBILINOGEN URINE: 0.2 MG/DL
WBC # FLD AUTO: 7.96 K/UL
WHITE BLOOD CELLS URINE: 149 /HPF

## 2025-04-13 LAB
BACTERIA UR CULT: NORMAL
PSA SERPL-MCNC: <0.01 NG/ML
URINE CYTOLOGY: NORMAL

## 2025-04-14 ENCOUNTER — APPOINTMENT (OUTPATIENT)
Dept: INFUSION THERAPY | Facility: HOSPITAL | Age: 85
End: 2025-04-14

## 2025-04-14 ENCOUNTER — RESULT REVIEW (OUTPATIENT)
Age: 85
End: 2025-04-14

## 2025-04-14 ENCOUNTER — APPOINTMENT (OUTPATIENT)
Dept: HEMATOLOGY ONCOLOGY | Facility: CLINIC | Age: 85
End: 2025-04-14

## 2025-04-14 LAB
ALBUMIN SERPL ELPH-MCNC: 3.7 G/DL — SIGNIFICANT CHANGE UP (ref 3.3–5)
ALP SERPL-CCNC: 110 U/L — SIGNIFICANT CHANGE UP (ref 40–120)
ALT FLD-CCNC: 19 U/L — SIGNIFICANT CHANGE UP (ref 10–45)
ANION GAP SERPL CALC-SCNC: 17 MMOL/L — SIGNIFICANT CHANGE UP (ref 5–17)
AST SERPL-CCNC: 24 U/L — SIGNIFICANT CHANGE UP (ref 10–40)
BASOPHILS # BLD AUTO: 0.1 K/UL — SIGNIFICANT CHANGE UP (ref 0–0.2)
BASOPHILS NFR BLD AUTO: 1.3 % — SIGNIFICANT CHANGE UP (ref 0–2)
BILIRUB SERPL-MCNC: 0.3 MG/DL — SIGNIFICANT CHANGE UP (ref 0.2–1.2)
BUN SERPL-MCNC: 25 MG/DL — HIGH (ref 7–23)
CALCIUM SERPL-MCNC: 8.7 MG/DL — SIGNIFICANT CHANGE UP (ref 8.4–10.5)
CHLORIDE SERPL-SCNC: 100 MMOL/L — SIGNIFICANT CHANGE UP (ref 96–108)
CO2 SERPL-SCNC: 21 MMOL/L — LOW (ref 22–31)
CREAT SERPL-MCNC: 2.09 MG/DL — HIGH (ref 0.5–1.3)
EGFR: 31 ML/MIN/1.73M2 — LOW
EGFR: 31 ML/MIN/1.73M2 — LOW
EOSINOPHIL # BLD AUTO: 0.2 K/UL — SIGNIFICANT CHANGE UP (ref 0–0.5)
EOSINOPHIL NFR BLD AUTO: 2.5 % — SIGNIFICANT CHANGE UP (ref 0–6)
GLUCOSE SERPL-MCNC: 246 MG/DL — HIGH (ref 70–99)
HCT VFR BLD CALC: 29.6 % — LOW (ref 39–50)
HGB BLD-MCNC: 9.9 G/DL — LOW (ref 13–17)
IMM GRANULOCYTES NFR BLD AUTO: 4.3 % — HIGH (ref 0–0.9)
LDH SERPL L TO P-CCNC: 257 U/L — HIGH (ref 50–242)
LYMPHOCYTES # BLD AUTO: 1.12 K/UL — SIGNIFICANT CHANGE UP (ref 1–3.3)
LYMPHOCYTES # BLD AUTO: 14.1 % — SIGNIFICANT CHANGE UP (ref 13–44)
MCHC RBC-ENTMCNC: 33.4 G/DL — SIGNIFICANT CHANGE UP (ref 32–36)
MCHC RBC-ENTMCNC: 35.2 PG — HIGH (ref 27–34)
MCV RBC AUTO: 105.3 FL — HIGH (ref 80–100)
MONOCYTES # BLD AUTO: 0.52 K/UL — SIGNIFICANT CHANGE UP (ref 0–0.9)
MONOCYTES NFR BLD AUTO: 6.6 % — SIGNIFICANT CHANGE UP (ref 2–14)
NEUTROPHILS # BLD AUTO: 5.65 K/UL — SIGNIFICANT CHANGE UP (ref 1.8–7.4)
NEUTROPHILS NFR BLD AUTO: 71.2 % — SIGNIFICANT CHANGE UP (ref 43–77)
NRBC BLD AUTO-RTO: 0 /100 WBCS — SIGNIFICANT CHANGE UP (ref 0–0)
PLATELET # BLD AUTO: 248 K/UL — SIGNIFICANT CHANGE UP (ref 150–400)
POTASSIUM SERPL-MCNC: 3.7 MMOL/L — SIGNIFICANT CHANGE UP (ref 3.5–5.3)
POTASSIUM SERPL-SCNC: 3.7 MMOL/L — SIGNIFICANT CHANGE UP (ref 3.5–5.3)
PROT SERPL-MCNC: 6.9 G/DL — SIGNIFICANT CHANGE UP (ref 6–8.3)
RBC # BLD: 2.81 M/UL — LOW (ref 4.2–5.8)
RBC # FLD: 19.7 % — HIGH (ref 10.3–14.5)
SODIUM SERPL-SCNC: 139 MMOL/L — SIGNIFICANT CHANGE UP (ref 135–145)
WBC # BLD: 7.93 K/UL — SIGNIFICANT CHANGE UP (ref 3.8–10.5)
WBC # FLD AUTO: 7.93 K/UL — SIGNIFICANT CHANGE UP (ref 3.8–10.5)

## 2025-04-15 ENCOUNTER — APPOINTMENT (OUTPATIENT)
Dept: INFUSION THERAPY | Facility: HOSPITAL | Age: 85
End: 2025-04-15

## 2025-04-16 ENCOUNTER — APPOINTMENT (OUTPATIENT)
Dept: INFUSION THERAPY | Facility: HOSPITAL | Age: 85
End: 2025-04-16

## 2025-04-17 ENCOUNTER — APPOINTMENT (OUTPATIENT)
Dept: INFUSION THERAPY | Facility: HOSPITAL | Age: 85
End: 2025-04-17

## 2025-04-18 ENCOUNTER — APPOINTMENT (OUTPATIENT)
Dept: INFUSION THERAPY | Facility: HOSPITAL | Age: 85
End: 2025-04-18

## 2025-04-18 ENCOUNTER — APPOINTMENT (OUTPATIENT)
Dept: CT IMAGING | Facility: IMAGING CENTER | Age: 85
End: 2025-04-18
Payer: MEDICARE

## 2025-04-18 ENCOUNTER — OUTPATIENT (OUTPATIENT)
Dept: OUTPATIENT SERVICES | Facility: HOSPITAL | Age: 85
LOS: 1 days | End: 2025-04-18
Payer: MEDICARE

## 2025-04-18 DIAGNOSIS — Z86.79 PERSONAL HISTORY OF OTHER DISEASES OF THE CIRCULATORY SYSTEM: Chronic | ICD-10-CM

## 2025-04-18 DIAGNOSIS — Z98.890 OTHER SPECIFIED POSTPROCEDURAL STATES: Chronic | ICD-10-CM

## 2025-04-18 DIAGNOSIS — Z98.49 CATARACT EXTRACTION STATUS, UNSPECIFIED EYE: Chronic | ICD-10-CM

## 2025-04-18 DIAGNOSIS — Z90.79 ACQUIRED ABSENCE OF OTHER GENITAL ORGAN(S): Chronic | ICD-10-CM

## 2025-04-18 DIAGNOSIS — N39.0 URINARY TRACT INFECTION, SITE NOT SPECIFIED: ICD-10-CM

## 2025-04-18 PROCEDURE — 74176 CT ABD & PELVIS W/O CONTRAST: CPT

## 2025-04-18 PROCEDURE — 74176 CT ABD & PELVIS W/O CONTRAST: CPT | Mod: 26

## 2025-04-21 ENCOUNTER — RX RENEWAL (OUTPATIENT)
Age: 85
End: 2025-04-21

## 2025-05-13 ENCOUNTER — TRANSCRIPTION ENCOUNTER (OUTPATIENT)
Age: 85
End: 2025-05-13

## 2025-05-13 DIAGNOSIS — I73.9 PERIPHERAL VASCULAR DISEASE, UNSPECIFIED: ICD-10-CM

## 2025-05-13 DIAGNOSIS — R26.89 OTHER ABNORMALITIES OF GAIT AND MOBILITY: ICD-10-CM

## 2025-05-13 DIAGNOSIS — M40.209 UNSPECIFIED KYPHOSIS, SITE UNSPECIFIED: ICD-10-CM

## 2025-05-13 DIAGNOSIS — R26.81 UNSTEADINESS ON FEET: ICD-10-CM

## 2025-05-13 DIAGNOSIS — M54.16 RADICULOPATHY, LUMBAR REGION: ICD-10-CM

## 2025-05-13 DIAGNOSIS — M54.9 DORSALGIA, UNSPECIFIED: ICD-10-CM

## 2025-05-20 ENCOUNTER — RESULT REVIEW (OUTPATIENT)
Age: 85
End: 2025-05-20

## 2025-05-20 ENCOUNTER — OUTPATIENT (OUTPATIENT)
Dept: OUTPATIENT SERVICES | Facility: HOSPITAL | Age: 85
LOS: 1 days | Discharge: HOME | End: 2025-05-20

## 2025-05-20 ENCOUNTER — APPOINTMENT (OUTPATIENT)
Dept: INFUSION THERAPY | Facility: HOSPITAL | Age: 85
End: 2025-05-20

## 2025-05-20 DIAGNOSIS — Z98.49 CATARACT EXTRACTION STATUS, UNSPECIFIED EYE: Chronic | ICD-10-CM

## 2025-05-20 DIAGNOSIS — Z98.890 OTHER SPECIFIED POSTPROCEDURAL STATES: Chronic | ICD-10-CM

## 2025-05-20 DIAGNOSIS — Z86.79 PERSONAL HISTORY OF OTHER DISEASES OF THE CIRCULATORY SYSTEM: Chronic | ICD-10-CM

## 2025-05-20 DIAGNOSIS — D46.9 MYELODYSPLASTIC SYNDROME, UNSPECIFIED: ICD-10-CM

## 2025-05-20 DIAGNOSIS — Z90.79 ACQUIRED ABSENCE OF OTHER GENITAL ORGAN(S): Chronic | ICD-10-CM

## 2025-05-20 DIAGNOSIS — D64.9 ANEMIA, UNSPECIFIED: ICD-10-CM

## 2025-05-20 LAB
ALBUMIN SERPL ELPH-MCNC: 4 G/DL — SIGNIFICANT CHANGE UP (ref 3.3–5)
ALP SERPL-CCNC: 111 U/L — SIGNIFICANT CHANGE UP (ref 40–120)
ALT FLD-CCNC: 10 U/L — SIGNIFICANT CHANGE UP (ref 10–45)
ANION GAP SERPL CALC-SCNC: 15 MMOL/L — SIGNIFICANT CHANGE UP (ref 5–17)
AST SERPL-CCNC: 19 U/L — SIGNIFICANT CHANGE UP (ref 10–40)
BASOPHILS # BLD AUTO: 0.05 K/UL — SIGNIFICANT CHANGE UP (ref 0–0.2)
BASOPHILS # BLD AUTO: 0.06 K/UL — SIGNIFICANT CHANGE UP (ref 0–0.2)
BASOPHILS NFR BLD AUTO: 1.7 % — SIGNIFICANT CHANGE UP (ref 0–2)
BASOPHILS NFR BLD AUTO: 2 % — SIGNIFICANT CHANGE UP (ref 0–2)
BILIRUB SERPL-MCNC: 0.5 MG/DL — SIGNIFICANT CHANGE UP (ref 0.2–1.2)
BUN SERPL-MCNC: 25 MG/DL — HIGH (ref 7–23)
CALCIUM SERPL-MCNC: 8.7 MG/DL — SIGNIFICANT CHANGE UP (ref 8.4–10.5)
CHLORIDE SERPL-SCNC: 101 MMOL/L — SIGNIFICANT CHANGE UP (ref 96–108)
CO2 SERPL-SCNC: 24 MMOL/L — SIGNIFICANT CHANGE UP (ref 22–31)
CREAT SERPL-MCNC: 1.88 MG/DL — HIGH (ref 0.5–1.3)
EGFR: 35 ML/MIN/1.73M2 — LOW
EGFR: 35 ML/MIN/1.73M2 — LOW
EOSINOPHIL # BLD AUTO: 0.14 K/UL — SIGNIFICANT CHANGE UP (ref 0–0.5)
EOSINOPHIL # BLD AUTO: 0.15 K/UL — SIGNIFICANT CHANGE UP (ref 0–0.5)
EOSINOPHIL NFR BLD AUTO: 4.7 % — SIGNIFICANT CHANGE UP (ref 0–6)
EOSINOPHIL NFR BLD AUTO: 5 % — SIGNIFICANT CHANGE UP (ref 0–6)
GLUCOSE SERPL-MCNC: 127 MG/DL — HIGH (ref 70–99)
HCT VFR BLD CALC: 29.1 % — LOW (ref 39–50)
HCT VFR BLD CALC: 29.5 % — LOW (ref 39–50)
HGB BLD-MCNC: 9.7 G/DL — LOW (ref 13–17)
HGB BLD-MCNC: 9.8 G/DL — LOW (ref 13–17)
IMM GRANULOCYTES NFR BLD AUTO: 1 % — HIGH (ref 0–0.9)
IMM GRANULOCYTES NFR BLD AUTO: 1.4 % — HIGH (ref 0–0.9)
LDH SERPL L TO P-CCNC: 205 U/L — SIGNIFICANT CHANGE UP (ref 50–242)
LYMPHOCYTES # BLD AUTO: 0.95 K/UL — LOW (ref 1–3.3)
LYMPHOCYTES # BLD AUTO: 0.97 K/UL — LOW (ref 1–3.3)
LYMPHOCYTES # BLD AUTO: 32 % — SIGNIFICANT CHANGE UP (ref 13–44)
LYMPHOCYTES # BLD AUTO: 32.1 % — SIGNIFICANT CHANGE UP (ref 13–44)
MCHC RBC-ENTMCNC: 33.2 G/DL — SIGNIFICANT CHANGE UP (ref 32–36)
MCHC RBC-ENTMCNC: 33.3 G/DL — SIGNIFICANT CHANGE UP (ref 32–36)
MCHC RBC-ENTMCNC: 34.9 PG — HIGH (ref 27–34)
MCHC RBC-ENTMCNC: 35.5 PG — HIGH (ref 27–34)
MCV RBC AUTO: 105 FL — HIGH (ref 80–100)
MCV RBC AUTO: 106.6 FL — HIGH (ref 80–100)
MONOCYTES # BLD AUTO: 0.31 K/UL — SIGNIFICANT CHANGE UP (ref 0–0.9)
MONOCYTES # BLD AUTO: 0.37 K/UL — SIGNIFICANT CHANGE UP (ref 0–0.9)
MONOCYTES NFR BLD AUTO: 10.5 % — SIGNIFICANT CHANGE UP (ref 2–14)
MONOCYTES NFR BLD AUTO: 12.2 % — SIGNIFICANT CHANGE UP (ref 2–14)
NEUTROPHILS # BLD AUTO: 1.45 K/UL — LOW (ref 1.8–7.4)
NEUTROPHILS # BLD AUTO: 1.47 K/UL — LOW (ref 1.8–7.4)
NEUTROPHILS NFR BLD AUTO: 47.8 % — SIGNIFICANT CHANGE UP (ref 43–77)
NEUTROPHILS NFR BLD AUTO: 49.6 % — SIGNIFICANT CHANGE UP (ref 43–77)
NRBC BLD AUTO-RTO: 0 /100 WBCS — SIGNIFICANT CHANGE UP (ref 0–0)
NRBC BLD AUTO-RTO: 0 /100 WBCS — SIGNIFICANT CHANGE UP (ref 0–0)
PLATELET # BLD AUTO: 172 K/UL — SIGNIFICANT CHANGE UP (ref 150–400)
PLATELET # BLD AUTO: 183 K/UL — SIGNIFICANT CHANGE UP (ref 150–400)
POTASSIUM SERPL-MCNC: 3.8 MMOL/L — SIGNIFICANT CHANGE UP (ref 3.5–5.3)
POTASSIUM SERPL-SCNC: 3.8 MMOL/L — SIGNIFICANT CHANGE UP (ref 3.5–5.3)
PROT SERPL-MCNC: 7 G/DL — SIGNIFICANT CHANGE UP (ref 6–8.3)
RBC # BLD: 2.73 M/UL — LOW (ref 4.2–5.8)
RBC # BLD: 2.81 M/UL — LOW (ref 4.2–5.8)
RBC # FLD: 20.5 % — HIGH (ref 10.3–14.5)
RBC # FLD: 20.7 % — HIGH (ref 10.3–14.5)
SODIUM SERPL-SCNC: 140 MMOL/L — SIGNIFICANT CHANGE UP (ref 135–145)
WBC # BLD: 2.96 K/UL — LOW (ref 3.8–10.5)
WBC # BLD: 3.03 K/UL — LOW (ref 3.8–10.5)
WBC # FLD AUTO: 2.96 K/UL — LOW (ref 3.8–10.5)
WBC # FLD AUTO: 3.03 K/UL — LOW (ref 3.8–10.5)

## 2025-05-21 ENCOUNTER — APPOINTMENT (OUTPATIENT)
Dept: INFUSION THERAPY | Facility: HOSPITAL | Age: 85
End: 2025-05-21

## 2025-05-21 DIAGNOSIS — R11.2 NAUSEA WITH VOMITING, UNSPECIFIED: ICD-10-CM

## 2025-05-21 DIAGNOSIS — Z51.11 ENCOUNTER FOR ANTINEOPLASTIC CHEMOTHERAPY: ICD-10-CM

## 2025-05-22 ENCOUNTER — APPOINTMENT (OUTPATIENT)
Dept: INFUSION THERAPY | Facility: HOSPITAL | Age: 85
End: 2025-05-22

## 2025-05-22 ENCOUNTER — APPOINTMENT (OUTPATIENT)
Dept: UROLOGY | Facility: CLINIC | Age: 85
End: 2025-05-22

## 2025-05-23 ENCOUNTER — APPOINTMENT (OUTPATIENT)
Dept: INFUSION THERAPY | Facility: HOSPITAL | Age: 85
End: 2025-05-23

## 2025-05-24 ENCOUNTER — RESULT REVIEW (OUTPATIENT)
Age: 85
End: 2025-05-24

## 2025-05-24 ENCOUNTER — APPOINTMENT (OUTPATIENT)
Dept: INFUSION THERAPY | Facility: HOSPITAL | Age: 85
End: 2025-05-24

## 2025-05-24 LAB
BASOPHILS # BLD AUTO: 0.04 K/UL — SIGNIFICANT CHANGE UP (ref 0–0.2)
BASOPHILS NFR BLD AUTO: 0.9 % — SIGNIFICANT CHANGE UP (ref 0–2)
EOSINOPHIL # BLD AUTO: 0.2 K/UL — SIGNIFICANT CHANGE UP (ref 0–0.5)
EOSINOPHIL NFR BLD AUTO: 4.4 % — SIGNIFICANT CHANGE UP (ref 0–6)
HCT VFR BLD CALC: 27.4 % — LOW (ref 39–50)
HGB BLD-MCNC: 9.2 G/DL — LOW (ref 13–17)
IMM GRANULOCYTES NFR BLD AUTO: 1.6 % — HIGH (ref 0–0.9)
LYMPHOCYTES # BLD AUTO: 0.99 K/UL — LOW (ref 1–3.3)
LYMPHOCYTES # BLD AUTO: 22 % — SIGNIFICANT CHANGE UP (ref 13–44)
MCHC RBC-ENTMCNC: 33.6 G/DL — SIGNIFICANT CHANGE UP (ref 32–36)
MCHC RBC-ENTMCNC: 35 PG — HIGH (ref 27–34)
MCV RBC AUTO: 104.2 FL — HIGH (ref 80–100)
MONOCYTES # BLD AUTO: 0.34 K/UL — SIGNIFICANT CHANGE UP (ref 0–0.9)
MONOCYTES NFR BLD AUTO: 7.5 % — SIGNIFICANT CHANGE UP (ref 2–14)
NEUTROPHILS # BLD AUTO: 2.87 K/UL — SIGNIFICANT CHANGE UP (ref 1.8–7.4)
NEUTROPHILS NFR BLD AUTO: 63.6 % — SIGNIFICANT CHANGE UP (ref 43–77)
NRBC BLD AUTO-RTO: 0 /100 WBCS — SIGNIFICANT CHANGE UP (ref 0–0)
PLATELET # BLD AUTO: 159 K/UL — SIGNIFICANT CHANGE UP (ref 150–400)
RBC # BLD: 2.63 M/UL — LOW (ref 4.2–5.8)
RBC # FLD: 19.9 % — HIGH (ref 10.3–14.5)
WBC # BLD: 4.51 K/UL — SIGNIFICANT CHANGE UP (ref 3.8–10.5)
WBC # FLD AUTO: 4.51 K/UL — SIGNIFICANT CHANGE UP (ref 3.8–10.5)

## 2025-05-29 PROBLEM — Z76.89 ENCOUNTER TO ESTABLISH CARE: Status: ACTIVE | Noted: 2025-05-29

## 2025-06-02 ENCOUNTER — INPATIENT (INPATIENT)
Facility: HOSPITAL | Age: 85
LOS: 10 days | Discharge: HOME HEALTH | End: 2025-06-13
Attending: INTERNAL MEDICINE | Admitting: INTERNAL MEDICINE
Payer: MEDICARE

## 2025-06-02 VITALS
HEART RATE: 61 BPM | RESPIRATION RATE: 24 BRPM | TEMPERATURE: 98 F | SYSTOLIC BLOOD PRESSURE: 141 MMHG | DIASTOLIC BLOOD PRESSURE: 93 MMHG | WEIGHT: 214.95 LBS | OXYGEN SATURATION: 95 %

## 2025-06-02 DIAGNOSIS — Z98.49 CATARACT EXTRACTION STATUS, UNSPECIFIED EYE: Chronic | ICD-10-CM

## 2025-06-02 DIAGNOSIS — Z98.890 OTHER SPECIFIED POSTPROCEDURAL STATES: Chronic | ICD-10-CM

## 2025-06-02 DIAGNOSIS — J96.01 ACUTE RESPIRATORY FAILURE WITH HYPOXIA: ICD-10-CM

## 2025-06-02 DIAGNOSIS — Z86.79 PERSONAL HISTORY OF OTHER DISEASES OF THE CIRCULATORY SYSTEM: Chronic | ICD-10-CM

## 2025-06-02 DIAGNOSIS — Z90.79 ACQUIRED ABSENCE OF OTHER GENITAL ORGAN(S): Chronic | ICD-10-CM

## 2025-06-02 LAB
ADD ON TEST-SPECIMEN IN LAB: SIGNIFICANT CHANGE UP
ALBUMIN SERPL ELPH-MCNC: 4 G/DL — SIGNIFICANT CHANGE UP (ref 3.3–5)
ALP SERPL-CCNC: 101 U/L — SIGNIFICANT CHANGE UP (ref 40–120)
ALT FLD-CCNC: 12 U/L — SIGNIFICANT CHANGE UP (ref 4–41)
ANION GAP SERPL CALC-SCNC: 17 MMOL/L — HIGH (ref 7–14)
APPEARANCE UR: CLEAR — SIGNIFICANT CHANGE UP
APTT BLD: 28.4 SEC — SIGNIFICANT CHANGE UP (ref 26.1–36.8)
AST SERPL-CCNC: 19 U/L — SIGNIFICANT CHANGE UP (ref 4–40)
BACTERIA # UR AUTO: NEGATIVE /HPF — SIGNIFICANT CHANGE UP
BASOPHILS # BLD AUTO: 0 K/UL — SIGNIFICANT CHANGE UP (ref 0–0.2)
BASOPHILS NFR BLD AUTO: 0 % — SIGNIFICANT CHANGE UP (ref 0–2)
BILIRUB SERPL-MCNC: 0.6 MG/DL — SIGNIFICANT CHANGE UP (ref 0.2–1.2)
BILIRUB UR-MCNC: NEGATIVE — SIGNIFICANT CHANGE UP
BLD GP AB SCN SERPL QL: NEGATIVE — SIGNIFICANT CHANGE UP
BLOOD GAS VENOUS COMPREHENSIVE RESULT: SIGNIFICANT CHANGE UP
BLOOD GAS VENOUS COMPREHENSIVE RESULT: SIGNIFICANT CHANGE UP
BUN SERPL-MCNC: 42 MG/DL — HIGH (ref 7–23)
CALCIUM SERPL-MCNC: 7.8 MG/DL — LOW (ref 8.4–10.5)
CAST: 1 /LPF — SIGNIFICANT CHANGE UP (ref 0–4)
CHLORIDE SERPL-SCNC: 103 MMOL/L — SIGNIFICANT CHANGE UP (ref 98–107)
CK MB BLD-MCNC: 9.1 % — HIGH (ref 0–2.5)
CK MB CFR SERPL CALC: 7.3 NG/ML — HIGH
CK SERPL-CCNC: 80 U/L — SIGNIFICANT CHANGE UP (ref 30–200)
CO2 SERPL-SCNC: 22 MMOL/L — SIGNIFICANT CHANGE UP (ref 22–31)
COLOR SPEC: YELLOW — SIGNIFICANT CHANGE UP
CREAT SERPL-MCNC: 2.45 MG/DL — HIGH (ref 0.5–1.3)
DIFF PNL FLD: NEGATIVE — SIGNIFICANT CHANGE UP
EGFR: 25 ML/MIN/1.73M2 — LOW
EGFR: 25 ML/MIN/1.73M2 — LOW
EOSINOPHIL # BLD AUTO: 0 K/UL — SIGNIFICANT CHANGE UP (ref 0–0.5)
EOSINOPHIL NFR BLD AUTO: 0 % — SIGNIFICANT CHANGE UP (ref 0–6)
FLUAV AG NPH QL: SIGNIFICANT CHANGE UP
FLUBV AG NPH QL: SIGNIFICANT CHANGE UP
GLUCOSE SERPL-MCNC: 160 MG/DL — HIGH (ref 70–99)
GLUCOSE UR QL: NEGATIVE MG/DL — SIGNIFICANT CHANGE UP
HCT VFR BLD CALC: 25.4 % — LOW (ref 39–50)
HGB BLD-MCNC: 8.5 G/DL — LOW (ref 13–17)
IANC: 2.98 K/UL — SIGNIFICANT CHANGE UP (ref 1.8–7.4)
INR BLD: 1.13 RATIO — SIGNIFICANT CHANGE UP (ref 0.85–1.16)
KETONES UR QL: NEGATIVE MG/DL — SIGNIFICANT CHANGE UP
LEUKOCYTE ESTERASE UR-ACNC: ABNORMAL
LYMPHOCYTES # BLD AUTO: 0.82 K/UL — LOW (ref 1–3.3)
LYMPHOCYTES # BLD AUTO: 19.5 % — SIGNIFICANT CHANGE UP (ref 13–44)
MCHC RBC-ENTMCNC: 33.5 G/DL — SIGNIFICANT CHANGE UP (ref 32–36)
MCHC RBC-ENTMCNC: 35.1 PG — HIGH (ref 27–34)
MCV RBC AUTO: 105 FL — HIGH (ref 80–100)
MONOCYTES # BLD AUTO: 0.08 K/UL — SIGNIFICANT CHANGE UP (ref 0–0.9)
MONOCYTES NFR BLD AUTO: 1.8 % — LOW (ref 2–14)
NEUTROPHILS # BLD AUTO: 3.15 K/UL — SIGNIFICANT CHANGE UP (ref 1.8–7.4)
NEUTROPHILS NFR BLD AUTO: 75.2 % — SIGNIFICANT CHANGE UP (ref 43–77)
NITRITE UR-MCNC: NEGATIVE — SIGNIFICANT CHANGE UP
PH UR: 6 — SIGNIFICANT CHANGE UP (ref 5–8)
PLATELET # BLD AUTO: 78 K/UL — LOW (ref 150–400)
POTASSIUM SERPL-MCNC: 4.2 MMOL/L — SIGNIFICANT CHANGE UP (ref 3.5–5.3)
POTASSIUM SERPL-SCNC: 4.2 MMOL/L — SIGNIFICANT CHANGE UP (ref 3.5–5.3)
PROT SERPL-MCNC: 6.9 G/DL — SIGNIFICANT CHANGE UP (ref 6–8.3)
PROT UR-MCNC: 100 MG/DL
PROTHROM AB SERPL-ACNC: 13.4 SEC — SIGNIFICANT CHANGE UP (ref 9.9–13.4)
RBC # BLD: 2.42 M/UL — LOW (ref 4.2–5.8)
RBC # FLD: 20.6 % — HIGH (ref 10.3–14.5)
RBC CASTS # UR COMP ASSIST: 1 /HPF — SIGNIFICANT CHANGE UP (ref 0–4)
RH IG SCN BLD-IMP: NEGATIVE — SIGNIFICANT CHANGE UP
RSV RNA NPH QL NAA+NON-PROBE: SIGNIFICANT CHANGE UP
SARS-COV-2 RNA SPEC QL NAA+PROBE: SIGNIFICANT CHANGE UP
SODIUM SERPL-SCNC: 142 MMOL/L — SIGNIFICANT CHANGE UP (ref 135–145)
SOURCE RESPIRATORY: SIGNIFICANT CHANGE UP
SP GR SPEC: 1.02 — SIGNIFICANT CHANGE UP (ref 1–1.03)
SQUAMOUS # UR AUTO: 2 /HPF — SIGNIFICANT CHANGE UP (ref 0–5)
TROPONIN T, HIGH SENSITIVITY RESULT: 166 NG/L — CRITICAL HIGH
TROPONIN T, HIGH SENSITIVITY RESULT: 171 NG/L — CRITICAL HIGH
UROBILINOGEN FLD QL: 0.2 MG/DL — SIGNIFICANT CHANGE UP (ref 0.2–1)
WBC # BLD: 4.19 K/UL — SIGNIFICANT CHANGE UP (ref 3.8–10.5)
WBC # FLD AUTO: 4.19 K/UL — SIGNIFICANT CHANGE UP (ref 3.8–10.5)
WBC UR QL: 44 /HPF — HIGH (ref 0–5)

## 2025-06-02 PROCEDURE — 71045 X-RAY EXAM CHEST 1 VIEW: CPT | Mod: 26

## 2025-06-02 PROCEDURE — 99291 CRITICAL CARE FIRST HOUR: CPT

## 2025-06-02 PROCEDURE — 99284 EMERGENCY DEPT VISIT MOD MDM: CPT | Mod: GC

## 2025-06-02 RX ORDER — FUROSEMIDE 10 MG/ML
40 INJECTION INTRAMUSCULAR; INTRAVENOUS ONCE
Refills: 0 | Status: COMPLETED | OUTPATIENT
Start: 2025-06-02 | End: 2025-06-02

## 2025-06-02 RX ADMIN — FUROSEMIDE 40 MILLIGRAM(S): 10 INJECTION INTRAMUSCULAR; INTRAVENOUS at 20:46

## 2025-06-02 NOTE — CONSULT NOTE ADULT - ATTENDING COMMENTS
pt is an 85 yo male with hx afib, ckd, gout prostate ca who presents  with increasing sob , in the er placed on bipap for hypoxemia.    ROS denies fever, chills , n, v, abdominal  pain, no headaches or melena.  pt alert and awake ,  bp 140/85 rr 25  o2 sat 100 on 80%  tv 550 ipap 10 /6    lungs dec bs bl heart s1s2   abdomen presacral edema  lower ext 3+ edema    labs    wbc 4 hgb 8 hct 25 bicarb  22 cr 2.45  bnp 6000    cxr reviewed bl pleural effusions    A/P   85 yo male with   sob pleural effusions and 3+ edema  txd for volume overload, on bipap for respiratory support.  -continue bipap 10/6 taper fio2 to 60% monitor o2 sat  -echo to evaluate lv function  -panculture, blood, urine, consider abx if fever elevated wbc  -check procalcitonin, cortisol, tsh  -dvt prophylaxis with sub q heparin  -monitor pulse ox on bipap  -continue lasix as tolerated, monitor urine output  -pt does not require icu level care

## 2025-06-02 NOTE — ED ADULT NURSE NOTE - NSFALLHARMRISKINTERV_ED_ALL_ED
Assistance OOB with selected safe patient handling equipment if applicable/Assistance with ambulation/Communicate risk of Fall with Harm to all staff, patient, and family/Monitor gait and stability/Provide visual cue: red socks, yellow wristband, yellow gown, etc/Reinforce activity limits and safety measures with patient and family/Bed in lowest position, wheels locked, appropriate side rails in place/Call bell, personal items and telephone in reach/Instruct patient to call for assistance before getting out of bed/chair/stretcher/Non-slip footwear applied when patient is off stretcher/[place] to call system/Physically safe environment - no spills, clutter or unnecessary equipment/Purposeful Proactive Rounding/Room/bathroom lighting operational, light cord in reach

## 2025-06-02 NOTE — ED PROVIDER NOTE - CLINICAL SUMMARY MEDICAL DECISION MAKING FREE TEXT BOX
85 Y/O M PMH MDS HTN CKD A Fib Anemia HTN Prostate CA gout obesity presents with 3 days of orthopnea, increasing shortness of breath and weight gain. Pt is unsure of how much weight he has lost but states his body feels swollen. Pt denies CP, fever, chills, nightsweats or any other sx or acute complaints. Pt states he has made the same amount of urine as usual over the past 2 days. Pt was hypoxic to 80% with EMS, denies h/o lung issues and denies h/o supplemental o2 use, denies prior h/o bipap use. Pt denies any other sx or acute complaints. Plan is a CXR to eval for consolidation, blood gas to eval for CO2 retention (pt already on an NRB on ED arrival), will obtain a CBC and CMP to eval for anemia or electrolyte disturbance, will obtain a troponin and BNP to eval a cardiogenic etiology of the patient's sx though a renal or mixed etiology is likely. Pt states he has never had dialysis before. Planning bipap, MICU/Pulm consult for 1st time BIPAP, admission for increased work of breathing, cxr ordered to eval for consolidation. 85 Y/O M PMH MDS HTN CKD A Fib Anemia HTN Prostate CA gout obesity presents with 3 days of orthopnea, increasing shortness of breath and weight gain. Pt is unsure of how much weight he has gained but states his body feels swollen. Pt denies CP, fever, chills, nightsweats or any other sx or acute complaints. Pt states he has made the same amount of urine as usual over the past 2 days. Pt was hypoxic to 80% with EMS, denies h/o lung issues and denies h/o supplemental o2 use, denies prior h/o bipap use. Pt denies any other sx or acute complaints. Plan is a CXR to eval for consolidation, blood gas to eval for CO2 retention (pt already on an NRB on ED arrival), will obtain a CBC and CMP to eval for anemia or electrolyte disturbance, will obtain a troponin and BNP to eval a cardiogenic etiology of the patient's sx though a renal or mixed etiology is likely. Pt states he has never had dialysis before. Planning bipap, MICU/Pulm consult for 1st time BIPAP, admission for increased work of breathing, cxr ordered to eval for consolidation.

## 2025-06-02 NOTE — CONSULT NOTE ADULT - ASSESSMENT
84M w/ prostate cancer, MDS, atrial fibrillation, CKD, gout, and hypertension who presented with respiratory distress — now MICU consulted for new BIPAP initiation.       #Respiratory distress  dx: Acute hf exacerbation vs.        Recommendations  84M w/ prostate cancer, MDS, atrial fibrillation, CKD, gout, and hypertension who presented with respiratory distress — now MICU consulted for new BIPAP initiation.       #Respiratory distress  dx: Acute hf exacerbation vs. PNA  PAtients symptoms likely secondary to new cardaic issue and patient had crackles and was edematious. elevated BUN as we.. Can also consider infection however no white count and no   fever.     Recommendations   [] Continue Bipap 10/6 60%  [] FU procal, cortisol tsh  [] TTE to evaluate cardiac function   [] Not a MICU candidate at this time    Recommendations

## 2025-06-02 NOTE — ED PROVIDER NOTE - HIV OFFER
Pt BIB [facility] for N/V from [facility]. Pt was seen at [facility] today for same and released. Pt got back to [facility] and called 911 to be brought to [facility] for 2nd opinion. Pt placed in lobby pending ER room.   
Previously Declined (within the last year)

## 2025-06-02 NOTE — ED PROVIDER NOTE - CRITICAL CARE ATTENDING CONTRIBUTION TO CARE
Hasmukh Patricio DO:  patient seen and evaluated with the PA.  I was present for key portions of the History & Physical, and I agree with the Impression & Plan. I have personally provided the amount of critical care time documented below, excluding time spent on separate procedures. 83 yo m pmh myelodysplastic syndrome on chemo (last session 12/18), CAD, CKD, HTN, HLD, pw sob. Reports several days of symptoms, SOB, worse with laying flat, mild FOX, fatigue, malaise.  Denies F/E, cough, congestion.  Reports leg swelling.  Denies CP.  Patient arrives HDS, appears tachypneic, hypoxic on room air to low 80s, improves with facemask.  Concern for CHF exacerbation.  Do not suspect PE.  Atypical for ACS.  Will check labs, imaging, given hypoxia and belly breathing, will start BiPAP. Hasmukh Patricio DO:  patient seen and evaluated with the PA.  I was present for key portions of the History & Physical, and I agree with the Impression & Plan. I have personally provided the amount of critical care time documented below, excluding time spent on separate procedures. 83 yo m pmh myelodysplastic syndrome on chemo (last session 12/18), CAD, CKD, HTN, HLD, pw sob. Reports several days of symptoms, SOB, worse with laying flat, mild FOX, fatigue, malaise.  Denies F/E, cough, congestion.  Reports leg swelling.  Denies CP.  Patient arrives HDS, appears tachypneic, hypoxic on room air to low 80s, improves with facemask.  Concern for CHF exacerbation.  Do not suspect PE.  Atypical for ACS.  Will check labs, imaging, given hypoxia and belly breathing, will start BiPAP..

## 2025-06-02 NOTE — CONSULT NOTE ADULT - SUBJECTIVE AND OBJECTIVE BOX
MICU Consult Note     -----Ascencion Bennett PGY-2-----  MICU Consulted for: respiratory distress -> New Bipap     Subjective    84M w/ prostate cancer, MDS, atrial fibrillation, CKD, gout, and hypertension who presented with respiratory distress — now MICU consulted for new BIPAP initiation.     Vitals: Afebrile, HR 60–80s, systolic BP 140s, tachypnea to 24 ? transitioned from nasal cannula to BIPAP   Labs: WBC 4, Hgb 8.5, Platelets 78, AG 17, BUN 42 / Cr 2.45 (baseline 1.8–2.2), Troponin 166, BNP 6,400   GAS: 7.30 / 50 / 25 / Lactate 2.5   Imaging CT chest: Cardiomegaly, mild bladder wall thickening CXR: Bilateral haziness and pleural effusions   TTE (12/2024): Moderate to severely reduced LVEF, mild aortic stenosis   Cultures: BCx 2024: Stenotrophomonas maltophilia, MRSE UCx 2024: Enterococcus faecalis     Home meds: Amlodipine, allopurinol, acyclovir, Bactrim, furosemide 50?mg, gabapentin 300?mg BID, cyclobenzaprine 5?mg TID, fluconazole 200?mg, clopidogrel, rosuvastatin, sertraline, sevelamer, sodium bicarbonate     Recommedations:     Review of system  [ ] Unable to assess 2/2     OBJECTIVE:  T(F): 97.9 (06-02-25 @ 19:14), Max: 97.9 (06-02-25 @ 19:14)  HR: 86 (06-02-25 @ 20:45) (61 - 86)  BP: 140/86 (06-02-25 @ 20:45) (140/86 - 141/93)  BP(mean): --  ABP: --  ABP(mean): --  RR: 20 (06-02-25 @ 20:45) (20 - 24)  SpO2: 100% (06-02-25 @ 20:45) (95% - 100%)  CVP(mm Hg): --    I/O Summary 24H    CAPILLARY BLOOD GLUCOSE    PHYSICAL EXAM:  GENERAL:   HEAD:    EYES:   NECK:   HEART:   LUNGS:   ABDOMEN:   EXTREMITIES:  NEURO:    PSYCH:   SKIN:     HOSPITAL MEDICATIONS:  MEDICATIONS  (STANDING):    MEDICATIONS  (PRN):      LABS:  CBC 06-02-25 @ 20:07                        8.5    4.19  )-----------( 78                    25.4     Hgb trend: 8.5 <--   WBC trend: 4.19 <--     CMP 06-02-25 @ 20:07    142  |  103  |  42[H]  ----------------------------<  160[H]  4.2   |  22  |  2.45[H]    Ca    7.8[L]      06-02-25 @ 20:07    TPro  6.9  /  Alb  4.0  /  TBili  0.6  /  DBili  x   /  AST  19  /  ALT  12  /  AlkPhos  101     06-02    Serum Cr (eGFR) trend: 2.45 (25) <--     PT/INR - ( 02 Jun 2025 20:07 )   PT: 13.4 sec;   INR: 1.13 ratio    PTT - ( 02 Jun 2025 20:07 ):28.4 sec  ABG Trend:       MICROBIOLOGY:     RADIOLOGY:  [ ] Reviewed and interpreted by me    Plan:           MICU Consult Note     -----Ascencion Bennett PGY-2-----  MICU Consulted for: respiratory distress -> New Bipap     Subjective    84M w/ prostate cancer, MDS, atrial fibrillation, CKD, gout, and hypertension who presented with respiratory distress — now MICU consulted for new BIPAP initiation.     Vitals: Afebrile, HR 60–80s, systolic BP 140s, tachypnea to 24 ? transitioned from nasal cannula to BIPAP   Labs: WBC 4, Hgb 8.5, Platelets 78, AG 17, BUN 42 / Cr 2.45 (baseline 1.8–2.2), Troponin 166, BNP 6,400   GAS: 7.30 / 50 / 25 / Lactate 2.5   Imaging CT chest: Cardiomegaly, mild bladder wall thickening CXR: Bilateral haziness and pleural effusions   TTE (12/2024): Moderate to severely reduced LVEF, mild aortic stenosis   Cultures: BCx 2024: Stenotrophomonas maltophilia, MRSE UCx 2024: Enterococcus faecalis     Home meds: Amlodipine, allopurinol, acyclovir, Bactrim, furosemide 50?mg, gabapentin 300?mg BID, cyclobenzaprine 5?mg TID, fluconazole 200?mg, clopidogrel, rosuvastatin, sertraline, sevelamer, sodium bicarbonate     Recommendations   [] Continue Bipap 10/6 60%  [] FU procal, cortisol tsh  [] TTE to evaluate cardiac function     Review of system: He endorsed shortness of breath. They denied fevers/chills, chest pain, abdomin pain, constipation/diarrehea, any issues with urination.      OBJECTIVE:  T(F): 97.9 (06-02-25 @ 19:14), Max: 97.9 (06-02-25 @ 19:14)  HR: 86 (06-02-25 @ 20:45) (61 - 86)  BP: 140/86 (06-02-25 @ 20:45) (140/86 - 141/93)  BP(mean): --  ABP: --  ABP(mean): --  RR: 20 (06-02-25 @ 20:45) (20 - 24)  SpO2: 100% (06-02-25 @ 20:45) (95% - 100%)  CVP(mm Hg): --    I/O Summary 24H    CAPILLARY BLOOD GLUCOSE    PHYSICAL EXAM:  GENERAL: Mild distress appearing  HEAD:  No acute deformations  EYES: Clear sclera, ocular movements intact  NECK: No JVD  HEART: regular , tachycardic  LUNGS: bilateral crackles  ABDOMEN: Non tender non distended  EXTREMITIES:+3 LE edema  NEURO:  Ao3  PSYCH: NA  SKIN: NA    HOSPITAL MEDICATIONS:  MEDICATIONS  (STANDING):    MEDICATIONS  (PRN):      LABS:  CBC 06-02-25 @ 20:07                        8.5    4.19  )-----------( 78                    25.4     Hgb trend: 8.5 <--   WBC trend: 4.19 <--     CMP 06-02-25 @ 20:07    142  |  103  |  42[H]  ----------------------------<  160[H]  4.2   |  22  |  2.45[H]    Ca    7.8[L]      06-02-25 @ 20:07    TPro  6.9  /  Alb  4.0  /  TBili  0.6  /  DBili  x   /  AST  19  /  ALT  12  /  AlkPhos  101     06-02    Serum Cr (eGFR) trend: 2.45 (25) <--     PT/INR - ( 02 Jun 2025 20:07 )   PT: 13.4 sec;   INR: 1.13 ratio    PTT - ( 02 Jun 2025 20:07 ):28.4 sec  ABG Trend:       MICROBIOLOGY:     RADIOLOGY:  [ ] Reviewed and interpreted by me    Plan:

## 2025-06-02 NOTE — ED PROVIDER NOTE - OBJECTIVE STATEMENT
85 Y/O M PMH MDS HTN CKD A Fib Anemia HTN Prostate CA gout obesity presents with 3 days of orthopnea, increasing shortness of breath and weight gain. Pt is unsure of how much weight he has lost but states his body feels swollen. Pt denies CP, fever, chills, nightsweats or any other sx or acute complaints. Pt states he has made the same amount of urine as usual over the past 2 days. Pt was hypoxic to 80% with EMS, denies h/o lung issues and denies h/o supplemental o2 use, denies prior h/o bipap use. Pt denies any other sx or acute complaints. 83 Y/O M PMH MDS HTN CKD A Fib Anemia HTN Prostate CA gout obesity presents with 3 days of orthopnea, increasing shortness of breath and weight gain. Pt is unsure of how much weight he has gained but states his body feels swollen. Pt denies CP, fever, chills, nightsweats or any other sx or acute complaints. Pt states he has made the same amount of urine as usual over the past 2 days. Pt was hypoxic to 80% with EMS, denies h/o lung issues and denies h/o supplemental o2 use, denies prior h/o bipap use. Pt denies any other sx or acute complaints.

## 2025-06-02 NOTE — ED PROVIDER NOTE - MUSCULOSKELETAL, MLM
Spine appears normal, range of motion is not limited, no muscle or joint tenderness. + 3+ edema symmetrically bilaterally.

## 2025-06-02 NOTE — ED ADULT NURSE NOTE - OBJECTIVE STATEMENT
Pt received to rm 25   , awake and alert, A&OX4, ambulatory with 2 assist . C/o SOB for the past few days. pt states he has noticed qalot of swelling on his body and his legs. lower legs noticed to be edematous. pt placed on BIPAP o2 100%. pt on cardiac monitor , NSR. pt denies any reccent fever or chills. pt skin clean dry and intact. pt noted to have a PICC line to the upper left arm.    Respirations even and labored. Denies CP,, N/V, HA, dizziness, palpitations, blurry vision. . Bed in lowest position, call bell within reach. Safety maintained.

## 2025-06-02 NOTE — ED ADULT NURSE REASSESSMENT NOTE - NS ED NURSE REASSESS COMMENT FT1
Report received from wayne Anne. Pt is resting in the stretcher. Respirations are equal and unlabored bilaterally. Pt is on bipap. pt is on continuous cardiac monitor. Stretcher is in the lowest position and safety maintained.

## 2025-06-03 ENCOUNTER — RX RENEWAL (OUTPATIENT)
Age: 85
End: 2025-06-03

## 2025-06-03 DIAGNOSIS — Z29.9 ENCOUNTER FOR PROPHYLACTIC MEASURES, UNSPECIFIED: ICD-10-CM

## 2025-06-03 DIAGNOSIS — Z79.899 OTHER LONG TERM (CURRENT) DRUG THERAPY: ICD-10-CM

## 2025-06-03 DIAGNOSIS — Z86.79 PERSONAL HISTORY OF OTHER DISEASES OF THE CIRCULATORY SYSTEM: ICD-10-CM

## 2025-06-03 DIAGNOSIS — N18.30 CHRONIC KIDNEY DISEASE, STAGE 3 UNSPECIFIED: ICD-10-CM

## 2025-06-03 DIAGNOSIS — I48.20 CHRONIC ATRIAL FIBRILLATION, UNSPECIFIED: ICD-10-CM

## 2025-06-03 DIAGNOSIS — C61 MALIGNANT NEOPLASM OF PROSTATE: ICD-10-CM

## 2025-06-03 DIAGNOSIS — I50.23 ACUTE ON CHRONIC SYSTOLIC (CONGESTIVE) HEART FAILURE: ICD-10-CM

## 2025-06-03 DIAGNOSIS — D46.9 MYELODYSPLASTIC SYNDROME, UNSPECIFIED: ICD-10-CM

## 2025-06-03 DIAGNOSIS — I50.9 HEART FAILURE, UNSPECIFIED: ICD-10-CM

## 2025-06-03 DIAGNOSIS — N17.9 ACUTE KIDNEY FAILURE, UNSPECIFIED: ICD-10-CM

## 2025-06-03 DIAGNOSIS — J96.01 ACUTE RESPIRATORY FAILURE WITH HYPOXIA: ICD-10-CM

## 2025-06-03 DIAGNOSIS — M10.9 GOUT, UNSPECIFIED: ICD-10-CM

## 2025-06-03 LAB
ADD ON TEST-SPECIMEN IN LAB: SIGNIFICANT CHANGE UP
ADD ON TEST-SPECIMEN IN LAB: SIGNIFICANT CHANGE UP
ALBUMIN SERPL ELPH-MCNC: 3.8 G/DL — SIGNIFICANT CHANGE UP (ref 3.3–5)
ALP SERPL-CCNC: 99 U/L — SIGNIFICANT CHANGE UP (ref 40–120)
ALT FLD-CCNC: 12 U/L — SIGNIFICANT CHANGE UP (ref 4–41)
ANION GAP SERPL CALC-SCNC: 15 MMOL/L — HIGH (ref 7–14)
AST SERPL-CCNC: 14 U/L — SIGNIFICANT CHANGE UP (ref 4–40)
B PERT DNA SPEC QL NAA+PROBE: SIGNIFICANT CHANGE UP
B PERT+PARAPERT DNA PNL SPEC NAA+PROBE: SIGNIFICANT CHANGE UP
BASOPHILS # BLD AUTO: 0.03 K/UL — SIGNIFICANT CHANGE UP (ref 0–0.2)
BASOPHILS NFR BLD AUTO: 0.8 % — SIGNIFICANT CHANGE UP (ref 0–2)
BILIRUB SERPL-MCNC: 0.6 MG/DL — SIGNIFICANT CHANGE UP (ref 0.2–1.2)
BLOOD GAS VENOUS COMPREHENSIVE RESULT: SIGNIFICANT CHANGE UP
BUN SERPL-MCNC: 41 MG/DL — HIGH (ref 7–23)
C PNEUM DNA SPEC QL NAA+PROBE: SIGNIFICANT CHANGE UP
CALCIUM SERPL-MCNC: 7.9 MG/DL — LOW (ref 8.4–10.5)
CHLORIDE SERPL-SCNC: 102 MMOL/L — SIGNIFICANT CHANGE UP (ref 98–107)
CO2 SERPL-SCNC: 23 MMOL/L — SIGNIFICANT CHANGE UP (ref 22–31)
CREAT SERPL-MCNC: 2.55 MG/DL — HIGH (ref 0.5–1.3)
EGFR: 24 ML/MIN/1.73M2 — LOW
EGFR: 24 ML/MIN/1.73M2 — LOW
EOSINOPHIL # BLD AUTO: 0.11 K/UL — SIGNIFICANT CHANGE UP (ref 0–0.5)
EOSINOPHIL NFR BLD AUTO: 2.9 % — SIGNIFICANT CHANGE UP (ref 0–6)
FLUAV SUBTYP SPEC NAA+PROBE: SIGNIFICANT CHANGE UP
FLUBV RNA SPEC QL NAA+PROBE: SIGNIFICANT CHANGE UP
GLUCOSE SERPL-MCNC: 162 MG/DL — HIGH (ref 70–99)
HADV DNA SPEC QL NAA+PROBE: SIGNIFICANT CHANGE UP
HCOV 229E RNA SPEC QL NAA+PROBE: SIGNIFICANT CHANGE UP
HCOV HKU1 RNA SPEC QL NAA+PROBE: SIGNIFICANT CHANGE UP
HCOV NL63 RNA SPEC QL NAA+PROBE: SIGNIFICANT CHANGE UP
HCOV OC43 RNA SPEC QL NAA+PROBE: SIGNIFICANT CHANGE UP
HCT VFR BLD CALC: 25 % — LOW (ref 39–50)
HGB BLD-MCNC: 8.3 G/DL — LOW (ref 13–17)
HMPV RNA SPEC QL NAA+PROBE: SIGNIFICANT CHANGE UP
HPIV1 RNA SPEC QL NAA+PROBE: SIGNIFICANT CHANGE UP
HPIV2 RNA SPEC QL NAA+PROBE: SIGNIFICANT CHANGE UP
HPIV3 RNA SPEC QL NAA+PROBE: SIGNIFICANT CHANGE UP
HPIV4 RNA SPEC QL NAA+PROBE: SIGNIFICANT CHANGE UP
IANC: 2.78 K/UL — SIGNIFICANT CHANGE UP (ref 1.8–7.4)
IMM GRANULOCYTES NFR BLD AUTO: 1.3 % — HIGH (ref 0–0.9)
LYMPHOCYTES # BLD AUTO: 0.64 K/UL — LOW (ref 1–3.3)
LYMPHOCYTES # BLD AUTO: 17 % — SIGNIFICANT CHANGE UP (ref 13–44)
M PNEUMO DNA SPEC QL NAA+PROBE: SIGNIFICANT CHANGE UP
MAGNESIUM SERPL-MCNC: 2.5 MG/DL — SIGNIFICANT CHANGE UP (ref 1.6–2.6)
MCHC RBC-ENTMCNC: 33.2 G/DL — SIGNIFICANT CHANGE UP (ref 32–36)
MCHC RBC-ENTMCNC: 35.2 PG — HIGH (ref 27–34)
MCV RBC AUTO: 105.9 FL — HIGH (ref 80–100)
MONOCYTES # BLD AUTO: 0.16 K/UL — SIGNIFICANT CHANGE UP (ref 0–0.9)
MONOCYTES NFR BLD AUTO: 4.2 % — SIGNIFICANT CHANGE UP (ref 2–14)
NEUTROPHILS # BLD AUTO: 2.78 K/UL — SIGNIFICANT CHANGE UP (ref 1.8–7.4)
NEUTROPHILS NFR BLD AUTO: 73.8 % — SIGNIFICANT CHANGE UP (ref 43–77)
NRBC # BLD AUTO: 0 K/UL — SIGNIFICANT CHANGE UP (ref 0–0)
NRBC # FLD: 0 K/UL — SIGNIFICANT CHANGE UP (ref 0–0)
NRBC BLD AUTO-RTO: 0 /100 WBCS — SIGNIFICANT CHANGE UP (ref 0–0)
OSMOLALITY SERPL: 319 MOSM/KG — HIGH (ref 275–295)
PHOSPHATE SERPL-MCNC: 5 MG/DL — HIGH (ref 2.5–4.5)
PLATELET # BLD AUTO: 68 K/UL — LOW (ref 150–400)
POTASSIUM SERPL-MCNC: 4.4 MMOL/L — SIGNIFICANT CHANGE UP (ref 3.5–5.3)
POTASSIUM SERPL-SCNC: 4.4 MMOL/L — SIGNIFICANT CHANGE UP (ref 3.5–5.3)
PROCALCITONIN SERPL-MCNC: 0.18 NG/ML — HIGH (ref 0.02–0.1)
PROT SERPL-MCNC: 6.6 G/DL — SIGNIFICANT CHANGE UP (ref 6–8.3)
RAPID RVP RESULT: SIGNIFICANT CHANGE UP
RBC # BLD: 2.36 M/UL — LOW (ref 4.2–5.8)
RBC # FLD: 20.9 % — HIGH (ref 10.3–14.5)
RSV RNA SPEC QL NAA+PROBE: SIGNIFICANT CHANGE UP
RV+EV RNA SPEC QL NAA+PROBE: SIGNIFICANT CHANGE UP
SARS-COV-2 RNA SPEC QL NAA+PROBE: SIGNIFICANT CHANGE UP
SODIUM SERPL-SCNC: 140 MMOL/L — SIGNIFICANT CHANGE UP (ref 135–145)
TSH SERPL-MCNC: 5.62 UIU/ML — HIGH (ref 0.27–4.2)
WBC # BLD: 3.77 K/UL — LOW (ref 3.8–10.5)
WBC # FLD AUTO: 3.77 K/UL — LOW (ref 3.8–10.5)

## 2025-06-03 PROCEDURE — 71045 X-RAY EXAM CHEST 1 VIEW: CPT | Mod: 26

## 2025-06-03 PROCEDURE — 99223 1ST HOSP IP/OBS HIGH 75: CPT

## 2025-06-03 RX ORDER — GABAPENTIN 400 MG/1
300 CAPSULE ORAL
Refills: 0 | Status: DISCONTINUED | OUTPATIENT
Start: 2025-06-03 | End: 2025-06-13

## 2025-06-03 RX ORDER — BUMETANIDE 1 MG/1
0.5 TABLET ORAL
Qty: 20 | Refills: 0 | Status: DISCONTINUED | OUTPATIENT
Start: 2025-06-03 | End: 2025-06-03

## 2025-06-03 RX ORDER — METOPROLOL SUCCINATE 50 MG/1
5 TABLET, EXTENDED RELEASE ORAL ONCE
Refills: 0 | Status: COMPLETED | OUTPATIENT
Start: 2025-06-03 | End: 2025-06-03

## 2025-06-03 RX ORDER — BUMETANIDE 1 MG/1
1 TABLET ORAL
Qty: 20 | Refills: 0 | Status: DISCONTINUED | OUTPATIENT
Start: 2025-06-03 | End: 2025-06-04

## 2025-06-03 RX ORDER — HEPARIN SODIUM 1000 [USP'U]/ML
5000 INJECTION INTRAVENOUS; SUBCUTANEOUS EVERY 8 HOURS
Refills: 0 | Status: DISCONTINUED | OUTPATIENT
Start: 2025-06-03 | End: 2025-06-04

## 2025-06-03 RX ORDER — ASPIRIN 325 MG
81 TABLET ORAL DAILY
Refills: 0 | Status: DISCONTINUED | OUTPATIENT
Start: 2025-06-03 | End: 2025-06-04

## 2025-06-03 RX ORDER — ACETAMINOPHEN 500 MG/5ML
650 LIQUID (ML) ORAL EVERY 6 HOURS
Refills: 0 | Status: DISCONTINUED | OUTPATIENT
Start: 2025-06-03 | End: 2025-06-03

## 2025-06-03 RX ORDER — BUMETANIDE 1 MG/1
2 TABLET ORAL EVERY 8 HOURS
Refills: 0 | Status: DISCONTINUED | OUTPATIENT
Start: 2025-06-03 | End: 2025-06-03

## 2025-06-03 RX ORDER — METOPROLOL SUCCINATE 50 MG/1
5 TABLET, EXTENDED RELEASE ORAL EVERY 6 HOURS
Refills: 0 | Status: DISCONTINUED | OUTPATIENT
Start: 2025-06-03 | End: 2025-06-04

## 2025-06-03 RX ORDER — CLOPIDOGREL BISULFATE 75 MG/1
75 TABLET, FILM COATED ORAL DAILY
Refills: 0 | Status: DISCONTINUED | OUTPATIENT
Start: 2025-06-03 | End: 2025-06-13

## 2025-06-03 RX ORDER — NITROGLYCERIN 20 MG/G
10 OINTMENT TOPICAL
Qty: 50 | Refills: 0 | Status: DISCONTINUED | OUTPATIENT
Start: 2025-06-03 | End: 2025-06-05

## 2025-06-03 RX ORDER — ROSUVASTATIN CALCIUM 20 MG/1
10 TABLET, FILM COATED ORAL AT BEDTIME
Refills: 0 | Status: DISCONTINUED | OUTPATIENT
Start: 2025-06-03 | End: 2025-06-05

## 2025-06-03 RX ORDER — MELATONIN 5 MG
3 TABLET ORAL AT BEDTIME
Refills: 0 | Status: DISCONTINUED | OUTPATIENT
Start: 2025-06-03 | End: 2025-06-13

## 2025-06-03 RX ORDER — FUROSEMIDE 10 MG/ML
40 INJECTION INTRAMUSCULAR; INTRAVENOUS
Refills: 0 | Status: DISCONTINUED | OUTPATIENT
Start: 2025-06-03 | End: 2025-06-03

## 2025-06-03 RX ADMIN — Medication 40 MILLIGRAM(S): at 06:01

## 2025-06-03 RX ADMIN — BUMETANIDE 5 MG/HR: 1 TABLET ORAL at 18:21

## 2025-06-03 RX ADMIN — FUROSEMIDE 40 MILLIGRAM(S): 10 INJECTION INTRAMUSCULAR; INTRAVENOUS at 06:00

## 2025-06-03 RX ADMIN — GABAPENTIN 300 MILLIGRAM(S): 400 CAPSULE ORAL at 06:01

## 2025-06-03 RX ADMIN — Medication 10 MILLIGRAM(S): at 21:15

## 2025-06-03 RX ADMIN — Medication 25 MILLIGRAM(S): at 06:01

## 2025-06-03 RX ADMIN — METOPROLOL SUCCINATE 5 MILLIGRAM(S): 50 TABLET, EXTENDED RELEASE ORAL at 02:13

## 2025-06-03 RX ADMIN — Medication 10 MILLIGRAM(S): at 14:20

## 2025-06-03 RX ADMIN — BUMETANIDE 2 MILLIGRAM(S): 1 TABLET ORAL at 14:21

## 2025-06-03 RX ADMIN — HEPARIN SODIUM 5000 UNIT(S): 1000 INJECTION INTRAVENOUS; SUBCUTANEOUS at 14:19

## 2025-06-03 RX ADMIN — HEPARIN SODIUM 5000 UNIT(S): 1000 INJECTION INTRAVENOUS; SUBCUTANEOUS at 21:14

## 2025-06-03 RX ADMIN — HEPARIN SODIUM 5000 UNIT(S): 1000 INJECTION INTRAVENOUS; SUBCUTANEOUS at 06:00

## 2025-06-03 NOTE — H&P ADULT - PROBLEM SELECTOR PLAN 10
DVT: HSQ  Diet: Dash / Renal   Dispo: Pending medical workup Patient unable to give medication list, unable to reach home aid  - Follow up with patient's aid to confirm medication list in morning

## 2025-06-03 NOTE — CONSULT NOTE ADULT - ASSESSMENT
83 y/o male with PMHX of CAD (LHC 3/18/21 mild LAD 60%, OM1 60%, Prox  ), HFrEF (JEAN 12/26/24 LVEF 35%, trace TR, MR), afib, anemia, renal artery stenosis s/p L renal stent, HTN, prstate CA/ MDS currently on chemotherapy via PICC line, last dose was in mid April.  Pt comes in with complaints of SOB and weight gain. He was found to be hypoxic in ED with O2 sat 80s, was placed on BIPAP. HF consult called on 6/3/25 to help manage care in this patient who is suspected to be in ADHF. Labs show proBNP 6482, lactate 2.5 now improved to 1.5, BUN/Cr 41/2.55, H/H 8.3/25, trop 171/166. CXR shows b/l pleural effusions. Pt still on BIPAP, pt's aide helped answer questions. Pt lives alone with live in aide. He admits to eating very salty meals- frozen dinners frequently. He is compliant with taking all HF medications, given to him by his aide.  83 y/o male with PMHX of CAD (LHC 3/18/21 mild LAD 60%, OM1 60%, Prox  ), HFrEF (JEAN 12/26/24 LVEF 35%, trace TR, MR), afib, anemia, renal artery stenosis s/p L renal stent, HTN, prstate CA/ MDS currently on chemotherapy via PICC line, last dose was in mid April.  Pt comes in with complaints of SOB and weight gain. He was found to be hypoxic in ED with O2 sat 80s, was placed on BIPAP. HF consult called on 6/3/25 to help manage care in this patient who is suspected to be in ADHF. Labs show proBNP 6482, lactate 2.5 now improved to 1.5, BUN/Cr 41/2.55, H/H 8.3/25, trop 171/166. CXR shows b/l pleural effusions. Pt still on BIPAP, pt's aide helped answer questions. Removing BIPAP sat dropped to 71%. Pt lives alone with live in aide. He admits to eating very salty meals- frozen dinners frequently. He is compliant with taking all HF medications, given to him by his aide. Overall stage C HF, NYHA class IV-severely hypervolemic and hypoxic requiring BIPAP.

## 2025-06-03 NOTE — H&P ADULT - PROBLEM SELECTOR PLAN 5
remote history of prostate cancer  - Last PSA negative  - F/u outpatient with urology remote history of prostate cancer  - Last PSA negative  - F/u outpatient with urology    #Recurrent UTI  - Was on chronic suppressive abx in past  - Follow up with aid in morning to confirm if on chronic cipro vs. 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

## 2025-06-03 NOTE — CHART NOTE - NSCHARTNOTEFT_GEN_A_CORE
Patient seen and examined this morning. Chart reviewed. 85 Y/O M PMH MDS, HTN, CKD 3, A Fib, Anemia, HTN, Prostate CA, gout, obesity admitted with acute hypoxic respiratory failure, requiring BiPAP 2/2 acute exacerbation of HFrEF.     HF, pulm and nephro consulted  Continue diuretics  Monitor O2 on BiPAP and wean as tolerated     ACTIVE PROBLEMS  #MDS  #Acute hypoxic respiratory failure  #ACUTE ON CHRONIC HFrEF  #CKD 3  #Afib w/RVR  #Anemia  #HTN  #Prostate CA  #Gout  #Obesity  #H/o prostate CA  #Hypercoagulable state 2/2 MDS

## 2025-06-03 NOTE — H&P ADULT - NSHPROSALLOTHERNEGRD_GEN_ALL_CORE
Advance Care Planning   Healthcare Decision Maker:    Primary Decision Maker: Bard Brewster spouse - [phone]    Click here to complete Healthcare Decision Makers including selection of the Healthcare Decision Maker Relationship (ie \"Primary\").
All other review of systems negative, except as noted in HPI

## 2025-06-03 NOTE — H&P ADULT - NSHPPHYSICALEXAM_GEN_ALL_CORE
T(C): 36.4 (06-02-25 @ 22:41), Max: 36.6 (06-02-25 @ 19:14)  HR: 90 (06-02-25 @ 22:41) (61 - 90)  BP: 127/93 (06-02-25 @ 22:41) (127/93 - 141/93)  RR: 20 (06-02-25 @ 22:41) (20 - 24)  SpO2: 98% (06-02-25 @ 22:41) (95% - 100%)    CONSTITUTIONAL: Well groomed, no apparent distress  EYES: PERRLA and symmetric, EOMI, No conjunctival or scleral injection, non-icteric  ENMT: Oral mucosa with moist membranes. Normal dentition; no pharyngeal injection or exudates             NECK: Supple, symmetric and without tracheal deviation   RESP: No respiratory distress, no use of accessory muscles; CTA b/l, no WRR  CV: RRR, +S1S2, no MRG; no JVD; no peripheral edema  GI: Soft, NT, ND, no rebound, no guarding; no palpable masses; no hepatosplenomegaly; no hernia palpated  LYMPH: No cervical LAD or tenderness; no axillary LAD or tenderness; no inguinal LAD or tenderness  MSK: Normal gait; No digital clubbing or cyanosis; examination of the (head/neck/spine/ribs/pelvis, RUE, LUE, RLE, LLE) without misalignment,            Normal ROM without pain, no spinal tenderness, normal muscle strength/tone  SKIN: No rashes or ulcers noted; no subcutaneous nodules or induration palpable  NEURO: CN II-XII intact; normal reflexes in upper and lower extremities, sensation intact in upper and lower extremities b/l to light touch   PSYCH: Appropriate insight/judgment; A+O x 3, mood and affect appropriate, recent/remote memory intact T(C): 36.4 (06-02-25 @ 22:41), Max: 36.6 (06-02-25 @ 19:14)  HR: 90 (06-02-25 @ 22:41) (61 - 90)  BP: 127/93 (06-02-25 @ 22:41) (127/93 - 141/93)  RR: 20 (06-02-25 @ 22:41) (20 - 24)  SpO2: 98% (06-02-25 @ 22:41) (95% - 100%)    CONSTITUTIONAL: On bipap, AO4  EYES: PERRLA and symmetric, EOMI, No conjunctival or scleral injection, non-icteric  ENMT: Oral mucosa with moist membranes. Normal dentition; no pharyngeal injection or exudates  NECK: Supple, symmetric and without tracheal deviation   RESP: B/l crackles at bases, otherwise CTA  CV: RRR, +S1S2, no MRG; no JVD; 4+ edema b/l   GI: Soft, NT, ND, no rebound, no guarding; no palpable masses; no hepatosplenomegaly; no hernia palpated  MSK: Grossly normal appearing   SKIN: No rashes or ulcers noted; no subcutaneous nodules or induration palpable  NEURO: Nonfocal exam  PSYCH: Appropriate insight/judgment; A+O x 3, mood and affect appropriate, recent/remote memory intact

## 2025-06-03 NOTE — CONSULT NOTE ADULT - SUBJECTIVE AND OBJECTIVE BOX
NEPHROLOGY - NSN    Patient seen and examined.    HPI:  83 Y/O M PMH MDS, HTN, CKD 3, A Fib, Anemia, HTN, Prostate CA, gout, obesity, presents with 3 days of orthopnea, increasing shortness of breath and weight gain. Pt is unsure of how much weight he has gained but states his body feels swollen. Complains of FOX, PND, inability to sleep or lay flat. Is on lasix 40mg daily and has not missed any doses. Admits to increased salty food intake. SOB progressed over the past 3 days now with increased WOB and decided to present for evaluation after his cardiologist recommending eval in the ED. Pt states he has made the same amount of urine as usual over the past 2 days. Pt was hypoxic to 80% with EMS, denies h/o lung issues and denies h/o supplemental o2 use, denies prior h/o bipap use. Pt denies any other sx or acute complaints. In ED, patient was hypoxic requiring O2 with mildly elevated lactate, belly breathing and was trialed on bipap with improvement. Admit for possible new CHF exacerbation    On interview the patient says his breathing has improved with bipap, feeling a bit better. Aside from SOB, FOX, PND, patient denies fever, chills, cough, headache, chest pain, palpitations, sputum production abd pain, n/v/d/c.  (03 Jun 2025 00:46)      PAST MEDICAL & SURGICAL HISTORY:  HTN - Hypertension      Hypercholesterolemia      PC (prostate cancer)  s/p surgical resection 3 years ago      Gout      Aneurysm, ascending aorta      H/O prostatectomy      H/O carotid endarterectomy      H/O renal artery stenosis  s/p stent in Left RA      Status post cataract extraction, unspecified laterality          MEDICATIONS  (STANDING):  allopurinol 200 milliGRAM(s) Oral daily  aspirin enteric coated 81 milliGRAM(s) Oral daily  clopidogrel Tablet 75 milliGRAM(s) Oral daily  furosemide   Injectable 40 milliGRAM(s) IV Push two times a day  gabapentin 300 milliGRAM(s) Oral two times a day  heparin   Injectable 5000 Unit(s) SubCutaneous every 8 hours  hydrALAZINE 25 milliGRAM(s) Oral every 8 hours  pantoprazole    Tablet 40 milliGRAM(s) Oral before breakfast  rosuvastatin 10 milliGRAM(s) Oral at bedtime      Allergies    No Known Allergies    Intolerances        SOCIAL HISTORY:  Denies alcohol abuse, drug abuse or tobacco usage.     FAMILY HISTORY:  No pertinent family history in first degree relatives        VITALS:  T(C): 36.3 (06-03-25 @ 12:04), Max: 36.6 (06-02-25 @ 19:14)  HR: 84 (06-03-25 @ 12:31) (61 - 90)  BP: 128/84 (06-03-25 @ 12:04) (117/62 - 141/93)  RR: 18 (06-03-25 @ 12:04) (17 - 24)  SpO2: 95% (06-03-25 @ 12:31) (92% - 100%)    REVIEW OF SYSTEMS:  Denies any nausea, vomiting, diarrhea, fever or chills. + SOB, focal weakness, hematuria or dysuria. +  fatigue or weakness. All other pertinent systems are reviewed and are negative.    PHYSICAL EXAM:  Constitutional: NAD  HEENT: EOMI  Neck:  No JVD, supple   Respiratory: course   Cardiovascular: S1 and S2, RRR  Gastrointestinal: + BS, soft, NT, ND  Extremities: No peripheral edema, + peripheral pulses  Neurological: A/O x 3, CN2-12 intact  Psychiatric: Normal mood, normal affect  : + Min  Skin: No rashes, C/D/I  Access: Not applicable    I and O's:    06-02 @ 07:01  -  06-03 @ 07:00  --------------------------------------------------------  IN: 0 mL / OUT: 100 mL / NET: -100 mL        Weight (kg): 97.5 (06-02 @ 19:14)    LABS:                        8.3    3.77  )-----------( 68       ( 03 Jun 2025 06:20 )             25.0     06-03    140  |  102  |  41[H]  ----------------------------<  162[H]  4.4   |  23  |  2.55[H]    Ca    7.9[L]      03 Jun 2025 06:20  Phos  5.0     06-03  Mg     2.50     06-03    TPro  6.6  /  Alb  3.8  /  TBili  0.6  /  DBili  x   /  AST  14  /  ALT  12  /  AlkPhos  99  06-03      URINE:  Urinalysis Basic - ( 03 Jun 2025 06:20 )    Color: x / Appearance: x / SG: x / pH: x  Gluc: 162 mg/dL / Ketone: x  / Bili: x / Urobili: x   Blood: x / Protein: x / Nitrite: x   Leuk Esterase: x / RBC: x / WBC x   Sq Epi: x / Non Sq Epi: x / Bacteria: x        RADIOLOGY & ADDITIONAL STUDIES:    < from: Xray Chest 1 View-PORTABLE IMMEDIATE (Xray Chest 1 View-PORTABLE IMMEDIATE .) (06.02.25 @ 19:54) >    ACC: [id number] EXAM:  XR CHEST PORTABLE IMMED 1V   ORDERED BY: HUMPHREY PARKER     PROCEDURE DATE:  06/02/2025          INTERPRETATION:  EXAMINATION: XR CHEST IMMEDIATE    CLINICAL INDICATION: hypoxia    TECHNIQUE: Single frontal portable view of thechest from 6/2/2025 7:54 PM    COMPARISON: Chest x-ray 12/26/2024.    FINDINGS:  Left upper terminate PICC terminates in the SVC.  The heart size is not accurately assessed on this projection.  Moderate bilateral pleural effusions with subjacent atelectasis.  No pneumothorax.    IMPRESSION:   Bilateral pleural effusions.    --- End of Report ---    < from: JEAN W or WO Ultrasound Enhancing Agent (12.26.24 @ 15:21) >    TRANSESOPHAGEAL ECHOCARDIOGRAM REPORT  ________________________________________________________________________________                                      _______       Pt. Name:       MASOOD MACARIO Study Date:    12/26/2024  MRN:            [id number]        YOB: 1940  Accession #:    [id number]         Age:           84 years  Account#:       [id number]      Gender:        M  Heart Rate:     100 bpm           Height:        65.75 in (167.00 cm)  Rhythm:        Weight:        182.98 lb (83.00 kg)  Blood Pressure: 139/84 mmHg       BSA/BMI:       1.92 m² / 29.76 kg/m²  ________________________________________________________________________________________  Referring Physician:    [id number] [facility]  Interpreting Physician: Patience Raman MD  Primary Sonographer:    Patience Raman MD    CPT:               ECHO 3D RECONSTRUCT W WORKSTATION - 11462.m;ECHO TRANSESOPH                     W/O CON - 66940.m;DOPPLER ECHO COMP W SPECT - 03508.m;DOPPL                     ECHO COLOR FLOW - 47000.m  Indication(s):     Endocarditis, valve unspecified - I38  Procedure:         Transesophageal echocardiogram performed with 2D, M-mode and                     complete spectral and color flow Doppler. Full volume                     3-dimensional reconstruction performed at the workstation.  Ordering Location: [facility]  Admission Status:  Inpatient  Study Information: Image quality for this study is adequate.    _______________________________________________________________________________________     CONCLUSIONS:      1. No echocardiographic evidence of vegetations.   2. Left ventricular systolic function is moderately to severely decreased. There are no regional wall motion abnormalities seen.   3.Normal right ventricular cavity size, with normal wall thickness, and normal right ventricular systolic function.   4. Mild aortic stenosis.   5. The peak transaortic velocity is 2.50 m/s, peak transaortic gradient is 25.0 mmHg and mean transaortic gradient is 14.0 mmHg with an LVOT/aortic valve VTI ratio of 0.38. The effective orifice area is estimated at 1.57 cm² by the continuity equation.   6. No pericardial effusion seen.   7. Compared to the transthoracic echocardiogram performed on 12/23/2024, there have been no significant interval changes.    ________________________________________________________________________________________  FINDINGS:     Left Ventricle:  Left ventricular wall thickness is normal. Left ventricular systolic function is moderately to severely decreased with a calculated ejection fraction of 35 % by 3D. There are no regional wall motion abnormalities seen. Unable to assess left ventricular diastolic function due to insufficient data.     Right Ventricle:  The right ventricular cavity is normal in size, with normal wall thickness and right ventricular systolic function is normal.     Right Atrium:  The right atrium is normal in size.     Interatrial Septum:  The interatrial septum appears intact.     AorticValve:  The aortic valve appears trileaflet with reduced systolic excursion. There is calcification of the aortic valve leaflets. There is mild aortic stenosis. The peak transaortic velocity is 2.50 m/s, peak transaortic gradient is 25.0 mmHg and mean transaortic gradient is 14.0 mmHg with an LVOT/aortic valve VTI ratio of 0.38. The aortic valve area is estimated at 1.57 cm² by the continuity equation. There is no evidence of aortic regurgitation.     Mitral Valve:  Structurally normal mitral valve with normal leaflet excursion. There is mitral valve thickening of the anterior and posterior leaflets. There is no mitral valve stenosis. There is trace mitral regurgitation.     Tricuspid Valve:  Structurally normal tricuspid valve with normal leaflet excursion. There is trace tricuspid regurgitation.     Pulmonic Valve:  Structurally normal pulmonic valve with normal leaflet excursion. There is trace pulmonic regurgitation.     Aorta:  The aortic root appears normal in size. There is mild atheroma in the visualized portions of the proximal ascending aorta. There is severe atheroma which measures up to 5.00 mm in the visualized portions of the transverse aortic arch. There is moderate atheroma which measures up to 4.00 mm in the visualized portions of the descending aorta.     Pericardium:  No pericardial effusion seen.     Systemic Veins:  The inferior vena cava is normal in size (normal <2.1cm) with normal inspiratory collapse (normal >50%) consistent with normal right atrial pressure (~3, range 0-5mmHg).  ____________________________________________________________________  QUANTITATIVE DATA:  Left Ventricle Measurements: (Indexed to BSA)     3D LV EF%: 35 %            LVOT / RVOT/ Qp/Qs Data: (Indexed to BSA)  LVOT Diameter: 2.30 cm  LVOT Area:     4.15 cm²  LVOT VTI:      17.00 cm  LVOT SV:       70.6 ml  36.77 ml/m²    Aortic Valve Measurements:  AV Vmax:                2.5 m/s  AV Peak Gradient:       25.0 mmHg  AV Mean Gradient:       14.0 mmHg  AV VTI:45.0 cm  AV VTI Ratio:           0.38  AoV EOA, Contin:        1.57 cm²  AoV EOA, Contin i:      0.82 cm²/m²  AoV Dimensionless Index 0.38       Tricuspid Valve Measurements:     RA Pressure: 3 mmHg       --------------------------------------------------------------------------------  [employer]:  LV Analysis:    < end of copied text >      < end of copied text >

## 2025-06-03 NOTE — H&P ADULT - PROBLEM SELECTOR PLAN 9
What Type Of Note Output Would You Prefer (Optional)?: Standard Output Is This A New Presentation, Or A Follow-Up?: Rash DVT: HSQ  Diet: Dash / Renal   Dispo: Pending medical workup Patient unable to give medication list, unable to reach home aid  - Follow up with patient's aid to confirm medication list in morning s/p L renal stent , follows with Dr. Reddy  - Cont home ASA and plavix

## 2025-06-03 NOTE — PATIENT PROFILE ADULT - NSPROPTRIGHTNOTIFY_GEN_A_NUR
Quality 226: Preventive Care And Screening: Tobacco Use: Screening And Cessation Intervention: Patient screened for tobacco use and is an ex/non-smoker
Quality 47: Advance Care Plan: Advance Care Planning discussed and documented; advance care plan or surrogate decision maker documented in the medical record.
Detail Level: Zone
Quality 130: Documentation Of Current Medications In The Medical Record: Current Medications Documented
declines

## 2025-06-03 NOTE — CONSULT NOTE ADULT - ASSESSMENT
85 y/o male with PMHX of CAD (LHC 3/18/21 mild LAD 60%, OM1 60%, Prox  ), HFrEF (JEAN 12/26/24 LVEF 35%, trace TR, MR), afib, anemia, renal artery stenosis s/p L renal stent, HTN, prstate CA/ MDS currently on chemotherapy via PICC line, last dose was in mid April.  Pt comes in with complaints of SOB and weight gain. He was found to be hypoxic in ED with O2 sat 80s, was placed on BIPAP. HF consult called on 6/3/25 to help manage care in this patient who is suspected to be in ADHF. Labs show proBNP 6482, lactate 2.5 now improved to 1.5, BUN/Cr 41/2.55, H/H 8.3/25, trop 171/166. CXR shows b/l pleural effusions. Pt still on BIPAP, pt's aide helped answer questions. Removing BIPAP sat dropped to 71%. Pt lives alone with live in aide. He admits to eating very salty meals- frozen dinners frequently. He is compliant with taking all HF medications, given to him by his aide. Overall stage C HF, NYHA class IV-severely hypervolemic and hypoxic requiring BIPAP.    #Acute on chronic systolic congestive heart failure.   ·  Recommendation: Stage C HF, NYHA class IV-severely hypervolemic and hypoxic requiring BIPAP.  Normotensive.   Move to CCU.   Start Bumex 1 mg/hr immediately.  In CCU should be put on NTG gtt at 10 mcg/min with rapid uptitration, as tolerated.  Keep K 4.0-5.0 and mag >2.0.   Daily standing weights.   Strict I/O.   Will add oral GDMT when able to take PO.   Informed primary team of the above.  Repeat echo to assess aortic stenosis.  Would ideal investigate coronary disease further, but pt has active CA and on chemo, with JUAN RAMON. Can consider ischemic eval.    83 Y/O M PMH MDS, HTN, CKD 3, A Fib, Anemia, HTN, Prostate CA, gout, obesity, presents with 3 days of orthopnea, increasing shortness of breath and weight gain concerning for heart failure exacerbation       Problem/Plan - 1:  ·  Problem: Acute hypoxic respiratory failure.  ·  Plan: -patient desaturated to 80's in field with EMS, noted to be hypoxic in ED and placed on Bipap  -MICU consulted, not candidate at this time, can continue on 10/6 with 60% FIO2.  -cultures obtained to r/o infectious etiology though given presentation more likely i/s/o CHF.  -will keep on Bipap overnight, monitor O2 status.    Problem/Plan - 2:  ·  Problem: Acute exacerbation of CHF (congestive heart failure).  ·  Plan: Patient presents with worsening volume overload, FOX, PND  - History of reduced ejection fraction on prior echos, last echo 12/16/24 with EF 35% mod reduced LV systolic function  - patient of Gabriel Reddy, home diuresis lasix 40 daily and low salt diet otherwise no GDMT  - Elevated proBNP to 6482, clinically overloaded on exam  - Start IV lasix 40 BID  - Hold home amlodipine  - Start hydral 25 TID for afterload reduction    - Strict I and O  - Daily standing weights   - Monitor on tele  - Repeat TTE  - Cards consult in morning for management of HFrEF.    Problem/Plan - 3:  ·  Problem: Acute kidney injury superimposed on CKD.  ·  Plan: Patient with history of USHA s/p stent, CKD 3 with creatinine baseline around 1.7  - Creatinine on presentation 2.45  - Suspect congestion vs. cardiorenal   - Diuresis as above  - Send urine studies   - Trend I and O  - Renally dose all medications   - one time bladder scan   - Trend CMP.    Problem/Plan - 4:  ·  Problem: Chronic atrial fibrillation.  ·  Plan: Patient with history of AF, managed with metoprolol prior   - CHADSVASc of 5  - Off AC currently   - No longer on rate control therapy  - Monitor on tele   - IV lopressor 5mg q6 for rate control until outpatient regimen confirmed in morning.    Problem/Plan - 5:  ·  Problem: MDS (myelodysplastic syndrome).  ·  Plan: 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    Problem/Plan - 6:  ·  Problem: Prostate cancer.  ·  Plan: remote history of prostate cancer  - Last PSA negative  - F/u outpatient with urology    #Recurrent UTI  - Was on chronic suppressive abx in past  - Follow up with aid in morning to confirm if on chronic cipro vs. levoflox   - F/u urine culture.    Problem/Plan - 7:  ·  Problem: Gout.  ·  Plan: - Cont home allopurinol.    Problem/Plan - 8:  ·  Problem: History of peripheral arterial disease.  ·  Plan: - Cont home crestor.    Problem/Plan - 9:  ·  Problem: H/O renal artery stenosis.  ·  Plan: s/p L renal stent , follows with Dr. Reddy  - Cont home ASA and plavix.    Problem/Plan - 10:  ·  Problem: Encounter for medication review.  ·  Plan; Patient unable to give medication list, unable to reach home aid  - Follow up with patient's aid to confirm medication list in morning.    Problem/Plan - 11:  ·  Problem: Prophylactic measure.   ·  Plan: DVT: HSQ  Diet: Dash / Renal   Dispo: Pending medical workup. 83 y/o male with PMHX of CAD (LHC 3/18/21 mild LAD 60%, OM1 60%, Prox  ), HFrEF (JEAN 12/26/24 LVEF 35%, trace TR, MR), afib, anemia, renal artery stenosis s/p L renal stent, HTN, prstate CA/ MDS currently on chemotherapy via PICC line, last dose was in mid April.  Pt comes in with complaints of SOB and weight gain. He was found to be hypoxic in ED with O2 sat 80s, was placed on BIPAP. HF consult called on 6/3/25 to help manage care in this patient who is suspected to be in ADHF. Labs show proBNP 6482, lactate 2.5 now improved to 1.5, BUN/Cr 41/2.55, H/H 8.3/25, trop 171/166. CXR shows b/l pleural effusions. Pt still on BIPAP, pt's aide helped answer questions. Removing BIPAP sat dropped to 71%. Pt lives alone with live in aide. He admits to eating very salty meals- frozen dinners frequently. He is compliant with taking all HF medications, given to him by his aide. Overall stage C HF, NYHA class IV-severely hypervolemic and hypoxic requiring BIPAP.      NEURO:  ALert and oriented x 3    RESPIRATORY:  SOB:  Continue BIPAP 16/6 FIO2 90%    CARDIAC:  #Acute on chronic systolic congestive heart failure.   Admit to CCU for bumex gtt  Stage C HF, NYHA class IV-severely hypervolemic and hypoxic requiring BIPAP.  Initiate  Bumex 1 mg/hr immediately.  In CCU should be put on NTG gtt at 10 mcg/min with rapid uptitration, as tolerated.  Keep K 4.0-5.0 and mag >2.0.   Daily standing weights.   Strict I/O.   Repeat echo ordered to assess aortic stenosis.  Consider ischemic eval if patient stabilizes  Creatinine elevated      Problem/Plan - 1:  ·  Problem: Acute hypoxic respiratory failure.  ·  Plan: -patient desaturated to 80's in field with EMS, noted to be hypoxic in ED and placed on Bipap  -MICU consulted, not candidate at this time, can continue on 10/6 with 60% FIO2.  -cultures obtained to r/o infectious etiology though given presentation more likely i/s/o CHF.  -will keep on Bipap overnight, monitor O2 status.    Problem/Plan - 2:  ·  Problem: Acute exacerbation of CHF (congestive heart failure).  ·  Plan: Patient presents with worsening volume overload, FOX, PND  - History of reduced ejection fraction on prior echos, last echo 12/16/24 with EF 35% mod reduced LV systolic function  - patient of Gabriel Reddy, home diuresis lasix 40 daily and low salt diet otherwise no GDMT  - Elevated proBNP to 6482, clinically overloaded on exam  - Start IV lasix 40 BID  - Hold home amlodipine  - Start hydral 25 TID for afterload reduction    - Strict I and O  - Daily standing weights   - Monitor on tele  - Repeat TTE  - Cards consult in morning for management of HFrEF.    Problem/Plan - 3:  ·  Problem: Acute kidney injury superimposed on CKD.  ·  Plan: Patient with history of USHA s/p stent, CKD 3 with creatinine baseline around 1.7  - Creatinine on presentation 2.45  - Suspect congestion vs. cardiorenal   - Diuresis as above  - Send urine studies   - Trend I and O  - Renally dose all medications   - one time bladder scan   - Trend CMP.    Problem/Plan - 4:  ·  Problem: Chronic atrial fibrillation.  ·  Plan: Patient with history of AF, managed with metoprolol prior   - CHADSVASc of 5  - Off AC currently   - No longer on rate control therapy  - Monitor on tele   - IV lopressor 5mg q6 for rate control until outpatient regimen confirmed in morning.    Problem/Plan - 5:  ·  Problem: MDS (myelodysplastic syndrome).  ·  Plan: 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    Problem/Plan - 6:  ·  Problem: Prostate cancer.  ·  Plan: remote history of prostate cancer  - Last PSA negative  - F/u outpatient with urology    #Recurrent UTI  - Was on chronic suppressive abx in past  - Follow up with aid in morning to confirm if on chronic cipro vs. levoflox   - F/u urine culture.    Problem/Plan - 7:  ·  Problem: Gout.  ·  Plan: - Cont home allopurinol.    Problem/Plan - 8:  ·  Problem: History of peripheral arterial disease.  ·  Plan: - Cont home crestor.    Problem/Plan - 9:  ·  Problem: H/O renal artery stenosis.  ·  Plan: s/p L renal stent , follows with Dr. Reddy  - Cont home ASA and plavix.    Problem/Plan - 10:  ·  Problem: Encounter for medication review.  ·  Plan; Patient unable to give medication list, unable to reach home aid  - Follow up with patient's aid to confirm medication list in morning.    Problem/Plan - 11:  ·  Problem: Prophylactic measure.   ·  Plan: DVT: HSQ  Diet: Dash / Renal   Dispo: Pending medical workup.

## 2025-06-03 NOTE — H&P ADULT - ASSESSMENT
83 Y/O M PMH MDS, HTN, CKD 3, A Fib, Anemia, HTN, Prostate CA, gout, obesity, presents with 3 days of orthopnea, increasing shortness of breath and weight gain concerning for heart failure exacerbation

## 2025-06-03 NOTE — H&P ADULT - ATTENDING COMMENTS
85 Y/O M PMH MDS, HTN, CKD 3, A Fib, Anemia, HTN, Prostate CA w/ recurrent UTI, gout, obesity, presents with 3 days of orthopnea, increasing shortness of breath and weight gain concerning for acute on chronic CHF exacerbation. History obtained from patient and aide at bedside who report that over the past 3-4 days patient has been experiencing worsening FOX, PND, and inability to sleep or lay flat. He is reportedly on lasix 40mg daily and has not missed any doses with adequate UOP but admits to increased salty food intake. Per aide, patient has also been having worsening edema over the past several months. Given his new respiratory symptoms his cardiologist recommending eval in the ED. Patient was noted to be hypoxic to 80s with EMS. CXR noting bilateral pleural effusions. MICU consulted, placed on Bipap with improvement in oxygenation, can keep on Bipap overnight. Given 40 lasix, BCx/UA/UCx collected though acute hypoxic resp failure more likely cardiac in nature vs. infectious givern overall presentation. Per previous chart review last TTE 12/24 noting 35% EF though patient only on lasix and low salt diet, unclear if patient was on coreg continuously or was stopped. BNP elevated to 6482. Agree with resident plan with Lasix 40 BID given patient's grossly overloaded status and hydralazine 25 TID. Obtain daily weights, monitor on telemetry, repeat TTE and would get cardiology consult in AM. Patient also with JUAN RAMON on CKD, agree with diuresis and urine studies and renally dosing medications. Unclear why patient off AC currently and not on rate control therapy - monitor on telemetry and can give IV lopressor for rate control for now until outpt regimen confirmed in AM. Patient also w/ history MDS - known anemia and thrombocytopenia, no need for transfusion at this time. Heme emailed. Patient also with hx prostate cancer w/ recurrent UTI - unclear if patient currently on ciprofloxacin vs. levofloxacin - can confirm with med rec in AM. Otherwise can f/u UCx. Continue with home allopurinol, crestor, aspirin/plavix. DVT PPX with heparin subq q8

## 2025-06-03 NOTE — H&P ADULT - HISTORY OF PRESENT ILLNESS
83 Y/O M PMH MDS, HTN, CKD, A Fib, Anemia, HTN, Prostate CA, gout, obesity, presents with 3 days of orthopnea, increasing shortness of breath and weight gain. Pt is unsure of how much weight he has gained but states his body feels swollen. Pt denies CP, fever, chills, nightsweats or any other sx or acute complaints. Pt states he has made the same amount of urine as usual over the past 2 days. Pt was hypoxic to 80% with EMS, denies h/o lung issues and denies h/o supplemental o2 use, denies prior h/o bipap use. Pt denies any other sx or acute complaints 85 Y/O M PMH MDS, HTN, CKD 3, A Fib, Anemia, HTN, Prostate CA, gout, obesity, presents with 3 days of orthopnea, increasing shortness of breath and weight gain. Pt is unsure of how much weight he has gained but states his body feels swollen. Complains of FOX, PND, inability to sleep or lay flat. Is on lasix 40mg daily and has not missed any doses. Admits to increased salty food intake. SOB progressed over the past 3 days now with increased WOB and decided to present for evaluation after his cardiologist recommending eval in the ED. Pt states he has made the same amount of urine as usual over the past 2 days. Pt was hypoxic to 80% with EMS, denies h/o lung issues and denies h/o supplemental o2 use, denies prior h/o bipap use. Pt denies any other sx or acute complaints. In ED, patient was hypoxic requiring O2 with hypercapnia, belly breathing and was trialed on bipap with improvement. Admit for possible new CHF exacerbation    On interview the patient says his breathing has improved with bipap, feeling a bit better. Aside from SOB, FOX, PND, patient denies fever, chills, cough, headache, chest pain, palpitations, sputum production abd pain, n/v/d/c.  83 Y/O M PMH MDS, HTN, CKD 3, A Fib, Anemia, HTN, Prostate CA, gout, obesity, presents with 3 days of orthopnea, increasing shortness of breath and weight gain. Pt is unsure of how much weight he has gained but states his body feels swollen. Complains of FOX, PND, inability to sleep or lay flat. Is on lasix 40mg daily and has not missed any doses. Admits to increased salty food intake. SOB progressed over the past 3 days now with increased WOB and decided to present for evaluation after his cardiologist recommending eval in the ED. Pt states he has made the same amount of urine as usual over the past 2 days. Pt was hypoxic to 80% with EMS, denies h/o lung issues and denies h/o supplemental o2 use, denies prior h/o bipap use. Pt denies any other sx or acute complaints. In ED, patient was hypoxic requiring O2 with mildly elevated lactate, belly breathing and was trialed on bipap with improvement. Admit for possible new CHF exacerbation    On interview the patient says his breathing has improved with bipap, feeling a bit better. Aside from SOB, FOX, PND, patient denies fever, chills, cough, headache, chest pain, palpitations, sputum production abd pain, n/v/d/c.

## 2025-06-03 NOTE — CONSULT NOTE ADULT - NS ATTEND OPT1A GEN_ALL_CORE
Ying Pedro (:  1972) is a 53 y.o. female,Established patient, here for evaluation of the following chief complaint(s): Cough (SINUS ISSUES STARTING NOVEMBER HAS GONE TO URGENT CARE TWICE SEVERAL NEG COVID TESTS, TAKING IBUPROFEN EVER 4 HOURS, HEAD PRESSURE RUNNY NOSE SORE THROAT FATIGUE LAST COVID TEST WAS 3 WEEKS AGO. NO SOB SHE HAS TAKEN AUGMENTIN AND ZPAK GIVEN NEITHER HELPED HER )        Bart Lobato was seen today for cough. Diagnoses and all orders for this visit:    Acute bacterial sinusitis  Persistent symptoms    -     levoFLOXacin (LEVAQUIN) 500 MG tablet; Take 1 tablet by mouth daily for 7 days  -     predniSONE (DELTASONE) 10 MG tablet; 4 tabs x 2 d 3 tabs x 2 d 2 tabs x 2 d 1  tab x 2 d in am with food  encouraged to continue treating the symptoms   Water: Drink 1 ounce of water for every 2 pounds of body weight for adults need 64 Ounces of water per day. This will loosen mucus in the head and chest & improve the weak feeling of dehydration, allow the body to get germ fighting resources to the infection. Half can be juice or sugar free Crystal Light. Don't count drinks with caffeine or carbonation. Infants can have Pedialyte liquid or freezer pops. Avoid salt if you have high Blood Pressure, swelling in the feet or ankles or have heart problems. Gargle: Gargle in the back of the throat with the head tilted back and to the sides with a strong mouthwash ( Listerine or Scope) after meals and at bedtime at least 4 -5 times a day. This helps kill bacteria and viruses in the back of the throat and will shorten the duration and decrease the severity of your symptoms: sore throat, cough, ear popping,/ear pain, and possibly dizziness. Zinc: Zinc lozenges such as Cold Filemon, or Basic will help shorten the duration and lessen symptoms such as sore throat, cough, nasal congestion, runny nose, and post nasal drip. Use 1 lozenge every 2-4 hours ( after meals if stomach is sensitive).  Children can use 10-15 mg. Or less 3-4 times a day or Zinc lollypops. In pregnancy limit to 50-60 mg. A day for 7 days as prenatals have Zinc also. With diarrhea use zinc pills 50 mg 1/2 to 1 pill 2x/day. Vitamins: Vitamin C 500 mg. With breakfast and dinner. Children and pregnant women should drink citrus juices. This speeds healing and strengthens immune system. SUBJECTIVE/OBJECTIVE:  HPI  things started as a cold- runny nose - cough slight sore throat no fevers - that would not get better in November  Was tested for COVID in 11/21 before 11/25   Had a cough was prescribed inhalers and this helped a bit  A few weeks later had bilateral ear pain and pressure   Persistent sore throat - was prescribed Augmentin   Ear was popping - nose running- tested for COVID again 1/2 in   taking OTC  Sinus pills has not helped much    Review of Systems   HENT: Positive for congestion and sore throat. No flowsheet data found.      Physical Exam    [INSTRUCTIONS:  \"[x]\" Indicates a positive item  \"[]\" Indicates a negative item  -- DELETE ALL ITEMS NOT EXAMINED]    Constitutional: [x] Appears well-developed and well-nourished [x] No apparent distress      [] Abnormal -     Mental status: [x] Alert and awake  [x] Oriented to person/place/time [x] Able to follow commands    [] Abnormal -     Eyes:   EOM    [x]  Normal    [] Abnormal -   Sclera  [x]  Normal    [] Abnormal -          Discharge [x]  None visible   [] Abnormal -     HENT: [x] Normocephalic, atraumatic  [] Abnormal -   [x] Mouth/Throat: Mucous membranes are moist    External Ears [x] Normal  [] Abnormal -    Neck: [x] No visualized mass [] Abnormal -     Pulmonary/Chest: [x] Respiratory effort normal   [x] No visualized signs of difficulty breathing or respiratory distress        [] Abnormal -      Musculoskeletal:   [x] Normal gait with no signs of ataxia         [x] Normal range of motion of neck        [] Abnormal -     Neurological:        [x] No Facial Asymmetry (Cranial nerve 7 motor function) (limited exam due to video visit)          [x] No gaze palsy        [] Abnormal -          Skin:        [x] No significant exanthematous lesions or discoloration noted on facial skin         [] Abnormal -            Psychiatric:       [x] Normal Affect [] Abnormal -        [x] No Hallucinations    Other pertinent observable physical exam findings:-                Dash Brooks, was evaluated through a synchronous (real-time) audio-video encounter. The patient (or guardian if applicable) is aware that this is a billable service. Verbal consent to proceed has been obtained within the past 12 months. The visit was conducted pursuant to the emergency declaration under the [facility], [address] [place] [address] authority and the [employer] Act. Patient identification was verified, and a caregiver was present when appropriate. The patient was located in a state where the provider was credentialed to provide care. An electronic signature was used to authenticate this note.     --Mary oJhnson, ROSSANA - CNP Medical decision making

## 2025-06-03 NOTE — CONSULT NOTE ADULT - SUBJECTIVE AND OBJECTIVE BOX
Date of Admission:    CHIEF COMPLAINT:    HISTORY OF PRESENT ILLNESS:          Allergies    No Known Allergies    Intolerances    	    MEDICATIONS:  aspirin enteric coated 81 milliGRAM(s) Oral daily  bumetanide Injectable 2 milliGRAM(s) IV Push every 8 hours  clopidogrel Tablet 75 milliGRAM(s) Oral daily  heparin   Injectable 5000 Unit(s) SubCutaneous every 8 hours  hydrALAZINE Injectable 10 milliGRAM(s) IV Push every 8 hours  metoprolol tartrate Injectable 5 milliGRAM(s) IV Push every 6 hours PRN        gabapentin 300 milliGRAM(s) Oral two times a day  melatonin 3 milliGRAM(s) Oral at bedtime PRN    pantoprazole    Tablet 40 milliGRAM(s) Oral before breakfast    allopurinol 200 milliGRAM(s) Oral daily  rosuvastatin 10 milliGRAM(s) Oral at bedtime        PAST MEDICAL & SURGICAL HISTORY:  HTN - Hypertension      Hypercholesterolemia      PC (prostate cancer)  s/p surgical resection 3 years ago      Gout      Aneurysm, ascending aorta      H/O prostatectomy      H/O carotid endarterectomy      H/O renal artery stenosis  s/p stent in Left RA      Status post cataract extraction, unspecified laterality          FAMILY HISTORY:  No pertinent family history in first degree relatives        SOCIAL HISTORY:    [ ] Non-smoker  [ ] Smoker  [ ] Alcohol      REVIEW OF SYSTEMS:  CONSTITUTIONAL: No fever, weight loss, or fatigue  EYES: No eye pain, visual disturbances, or discharge  ENMT:  No difficulty hearing, tinnitus, vertigo; No sinus or throat pain  NECK: No pain or stiffness  RESPIRATORY: No cough, wheezing, chills or hemoptysis; No Shortness of Breath  CARDIOVASCULAR: No chest pain, palpitations, passing out, dizziness, or leg swelling  GASTROINTESTINAL: No abdominal or epigastric pain. No nausea, vomiting, or hematemesis; No diarrhea or constipation. No melena or hematochezia.  GENITOURINARY: No dysuria, frequency, hematuria, or incontinence  NEUROLOGICAL: No headaches, memory loss, loss of strength, numbness, or tremors  SKIN: No itching, burning, rashes, or lesions   LYMPH Nodes: No enlarged glands  ENDOCRINE: No heat or cold intolerance; No hair loss  MUSCULOSKELETAL: No joint pain or swelling; No muscle, back, or extremity pain  PSYCHIATRIC: No depression, anxiety, mood swings, or difficulty sleeping  HEME/LYMPH: No easy bruising, or bleeding gums  ALLERY AND IMMUNOLOGIC: No hives or eczema	    [ ] All others negative	  [ ] Unable to obtain    PHYSICAL EXAM:  T(C): 36.3 (06-03-25 @ 12:04), Max: 36.6 (06-02-25 @ 19:14)  HR: 91 (06-03-25 @ 15:49) (61 - 91)  BP: 133/65 (06-03-25 @ 14:37) (117/62 - 141/93)  RR: 18 (06-03-25 @ 12:04) (17 - 24)  SpO2: 98% (06-03-25 @ 15:49) (92% - 100%)  Wt(kg): --  I&O's Summary    02 Jun 2025 07:01  -  03 Jun 2025 07:00  --------------------------------------------------------  IN: 0 mL / OUT: 100 mL / NET: -100 mL        Appearance: Normal	  HEENT:   Normal oral mucosa, PERRL, EOMI	  Lymphatic: No lymphadenopathy  Cardiovascular: Normal S1 S2, No JVD, No murmurs, No edema  Respiratory: Lungs clear to auscultation	  Psychiatry: A & O x 3, Mood & affect appropriate  Gastrointestinal:  Soft, Non-tender, + BS	  Skin: No rashes, No ecchymoses, No cyanosis	  Neurologic: Non-focal  Extremities: Normal range of motion, No clubbing, cyanosis or edema  Vascular: Peripheral pulses palpable 2+ bilaterally        LABS:	 	    CBC Full  -  ( 03 Jun 2025 06:20 )  WBC Count : 3.77 K/uL  Hemoglobin : 8.3 g/dL  Hematocrit : 25.0 %  Platelet Count - Automated : 68 K/uL  Mean Cell Volume : 105.9 fL  Mean Cell Hemoglobin : 35.2 pg  Mean Cell Hemoglobin Concentration : 33.2 g/dL  Auto Neutrophil # : x  Auto Lymphocyte # : x  Auto Monocyte # : x  Auto Eosinophil # : x  Auto Basophil # : x  Auto Neutrophil % : x  Auto Lymphocyte % : x  Auto Monocyte % : x  Auto Eosinophil % : x  Auto Basophil % : x    06-03    140  |  102  |  41[H]  ----------------------------<  162[H]  4.4   |  23  |  2.55[H]  06-02    142  |  103  |  42[H]  ----------------------------<  160[H]  4.2   |  22  |  2.45[H]    Ca    7.9[L]      03 Jun 2025 06:20  Ca    7.8[L]      02 Jun 2025 20:07  Phos  5.0     06-03  Mg     2.50     06-03    TPro  6.6  /  Alb  3.8  /  TBili  0.6  /  DBili  x   /  AST  14  /  ALT  12  /  AlkPhos  99  06-03  TPro  6.9  /  Alb  4.0  /  TBili  0.6  /  DBili  x   /  AST  19  /  ALT  12  /  AlkPhos  101  06-02      < from: JEAN W or WO Ultrasound Enhancing Agent (12.26.24 @ 15:21) >     CONCLUSIONS:      1. No echocardiographic evidence of vegetations.   2. Left ventricular systolic function is moderately to severely decreased. There are no regional wall motion abnormalities seen.   3.Normal right ventricular cavity size, with normal wall thickness, and normal right ventricular systolic function.   4. Mild aortic stenosis.   5. The peak transaortic velocity is 2.50 m/s, peak transaortic gradient is 25.0 mmHg and mean transaortic gradient is 14.0 mmHg with an LVOT/aortic valve VTI ratio of 0.38. The effective orifice area is estimated at 1.57 cm² by the continuity equation.   6. No pericardial effusion seen.   7. Compared to the transthoracic echocardiogram performed on 12/23/2024, there have been no significant interval changes.    ________________________________________________________________________________________  FINDINGS:     Left Ventricle:  Left ventricular wall thickness is normal. Left ventricular systolic function is moderately to severely decreased with a calculated ejection fraction of 35 % by 3D. There are no regional wall motion abnormalities seen. Unable to assess left ventricular diastolic function due to insufficient data.     Right Ventricle:  The right ventricular cavity is normal in size, with normal wall thickness and right ventricular systolic function is normal.     Right Atrium:  The right atrium is normal in size.     Interatrial Septum:  The interatrial septum appears intact.     AorticValve:  The aortic valve appears trileaflet with reduced systolic excursion. There is calcification of the aortic valve leaflets. There is mild aortic stenosis. The peak transaortic velocity is 2.50 m/s, peak transaortic gradient is 25.0 mmHg and mean transaortic gradient is 14.0 mmHg with an LVOT/aortic valve VTI ratio of 0.38. The aortic valve area is estimated at 1.57 cm² by the continuity equation. There is no evidence of aortic regurgitation.     Mitral Valve:  Structurally normal mitral valve with normal leaflet excursion. There is mitral valve thickening of the anterior and posterior leaflets. There is no mitral valve stenosis. There is trace mitral regurgitation.     Tricuspid Valve:  Structurally normal tricuspid valve with normal leaflet excursion. There is trace tricuspid regurgitation.     Pulmonic Valve:  Structurally normal pulmonic valve with normal leaflet excursion. There is trace pulmonic regurgitation.     Aorta:  The aortic root appears normal in size. There is mild atheroma in the visualized portions of the proximal ascending aorta. There is severe atheroma which measures up to 5.00 mm in the visualized portions of the transverse aortic arch. There is moderate atheroma which measures up to 4.00 mm in the visualized portions of the descending aorta.     Pericardium:  No pericardial effusion seen.     Systemic Veins:  The inferior vena cava is normal in size (normal <2.1cm) with normal inspiratory collapse (normal >50%) consistent with normal right atrial pressure (~3, range 0-5mmHg).  ____________________________________________________________________  QUANTITATIVE DATA:  Left Ventricle Measurements: (Indexed to BSA)     3D LV EF%: 35 %            LVOT / RVOT/ Qp/Qs Data: (Indexed to BSA)  LVOT Diameter: 2.30 cm  LVOT Area:     4.15 cm²  LVOT VTI:      17.00 cm  LVOT SV:       70.6 ml  36.77 ml/m²    Aortic Valve Measurements:  AV Vmax:                2.5 m/s  AV Peak Gradient:       25.0 mmHg  AV Mean Gradient:       14.0 mmHg  AV VTI:45.0 cm  AV VTI Ratio:           0.38  AoV EOA, Contin:        1.57 cm²  AoV EOA, Contin i:      0.82 cm²/m²  AoV Dimensionless Index 0.38       Tricuspid Valve Measurements:     RA Pressure: 3 mmHg    < end of copied text >    < from: Cardiac Cath Lab - Adult (03.18.21 @ 09:35) >  LM:   --  LM: Normal.  LAD:   --  Mid LAD: There was a 60 % stenosis.  CX:   --  OM1: There was a 60 % stenosis.  RCA:   --  Proximal RCA: There was a diffuse 100 % stenosis. 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  COMPLICATIONS: There were no complications.  DIAGNOSTIC RECOMMENDATIONS: Given the CKD, will defer  PCI until after  failure of medical therapy.    < end of copied text >             Date of Admission: 6/2/25    CHIEF COMPLAINT: SOB     HISTORY OF PRESENT ILLNESS:    8.3 y/o male with PMHX of CAD (LHC 3/18/21 mild LAD 60%, OM1 60%, Prox  ), HFrEF (JEAN 12/26/24 LVEF 35%, trace TR, MR), afib, anemia, renal artery stenosis s/p L renal stent, HTN, prstate CA/ MDS comes in with complaints of SOB and weight gain. He was found to be hypoxic in ED with O2 sat 80s, was placed on BIPAP. HF consult called on 6/3/25 to help manage care in this patient who is suspected to be in ADHF. Labs show proBNP 6482, lactate 2.5 now improved to 1.5, BUN/Cr 41/2.55, H/H 8.3/25, trop 171/166. CXR shows b/l pleural effusions.           Allergies    No Known Allergies    Intolerances    	    MEDICATIONS:  aspirin enteric coated 81 milliGRAM(s) Oral daily  bumetanide Injectable 2 milliGRAM(s) IV Push every 8 hours  clopidogrel Tablet 75 milliGRAM(s) Oral daily  heparin   Injectable 5000 Unit(s) SubCutaneous every 8 hours  hydrALAZINE Injectable 10 milliGRAM(s) IV Push every 8 hours  metoprolol tartrate Injectable 5 milliGRAM(s) IV Push every 6 hours PRN        gabapentin 300 milliGRAM(s) Oral two times a day  melatonin 3 milliGRAM(s) Oral at bedtime PRN    pantoprazole    Tablet 40 milliGRAM(s) Oral before breakfast    allopurinol 200 milliGRAM(s) Oral daily  rosuvastatin 10 milliGRAM(s) Oral at bedtime        PAST MEDICAL & SURGICAL HISTORY:  HTN - Hypertension      Hypercholesterolemia      PC (prostate cancer)  s/p surgical resection 3 years ago      Gout      Aneurysm, ascending aorta      H/O prostatectomy      H/O carotid endarterectomy      H/O renal artery stenosis  s/p stent in Left RA      Status post cataract extraction, unspecified laterality          FAMILY HISTORY:  No pertinent family history in first degree relatives        SOCIAL HISTORY:    [ ] Non-smoker  [ ] Smoker  [ ] Alcohol      REVIEW OF SYSTEMS:  CONSTITUTIONAL: No fever, weight loss, or fatigue  EYES: No eye pain, visual disturbances, or discharge  ENMT:  No difficulty hearing, tinnitus, vertigo; No sinus or throat pain  NECK: No pain or stiffness  RESPIRATORY: No cough, wheezing, chills or hemoptysis; No Shortness of Breath  CARDIOVASCULAR: No chest pain, palpitations, passing out, dizziness, or leg swelling  GASTROINTESTINAL: No abdominal or epigastric pain. No nausea, vomiting, or hematemesis; No diarrhea or constipation. No melena or hematochezia.  GENITOURINARY: No dysuria, frequency, hematuria, or incontinence  NEUROLOGICAL: No headaches, memory loss, loss of strength, numbness, or tremors  SKIN: No itching, burning, rashes, or lesions   LYMPH Nodes: No enlarged glands  ENDOCRINE: No heat or cold intolerance; No hair loss  MUSCULOSKELETAL: No joint pain or swelling; No muscle, back, or extremity pain  PSYCHIATRIC: No depression, anxiety, mood swings, or difficulty sleeping  HEME/LYMPH: No easy bruising, or bleeding gums  ALLERY AND IMMUNOLOGIC: No hives or eczema	    [ ] All others negative	  [ ] Unable to obtain    PHYSICAL EXAM:  T(C): 36.3 (06-03-25 @ 12:04), Max: 36.6 (06-02-25 @ 19:14)  HR: 91 (06-03-25 @ 15:49) (61 - 91)  BP: 133/65 (06-03-25 @ 14:37) (117/62 - 141/93)  RR: 18 (06-03-25 @ 12:04) (17 - 24)  SpO2: 98% (06-03-25 @ 15:49) (92% - 100%)  Wt(kg): --  I&O's Summary    02 Jun 2025 07:01  -  03 Jun 2025 07:00  --------------------------------------------------------  IN: 0 mL / OUT: 100 mL / NET: -100 mL        Appearance: Normal	  HEENT:   Normal oral mucosa, PERRL, EOMI	  Lymphatic: No lymphadenopathy  Cardiovascular: Normal S1 S2, No JVD, No murmurs, No edema  Respiratory: Lungs clear to auscultation	  Psychiatry: A & O x 3, Mood & affect appropriate  Gastrointestinal:  Soft, Non-tender, + BS	  Skin: No rashes, No ecchymoses, No cyanosis	  Neurologic: Non-focal  Extremities: Normal range of motion, No clubbing, cyanosis or edema  Vascular: Peripheral pulses palpable 2+ bilaterally        LABS:	 	    CBC Full  -  ( 03 Jun 2025 06:20 )  WBC Count : 3.77 K/uL  Hemoglobin : 8.3 g/dL  Hematocrit : 25.0 %  Platelet Count - Automated : 68 K/uL  Mean Cell Volume : 105.9 fL  Mean Cell Hemoglobin : 35.2 pg  Mean Cell Hemoglobin Concentration : 33.2 g/dL  Auto Neutrophil # : x  Auto Lymphocyte # : x  Auto Monocyte # : x  Auto Eosinophil # : x  Auto Basophil # : x  Auto Neutrophil % : x  Auto Lymphocyte % : x  Auto Monocyte % : x  Auto Eosinophil % : x  Auto Basophil % : x    06-03    140  |  102  |  41[H]  ----------------------------<  162[H]  4.4   |  23  |  2.55[H]  06-02    142  |  103  |  42[H]  ----------------------------<  160[H]  4.2   |  22  |  2.45[H]    Ca    7.9[L]      03 Jun 2025 06:20  Ca    7.8[L]      02 Jun 2025 20:07  Phos  5.0     06-03  Mg     2.50     06-03    TPro  6.6  /  Alb  3.8  /  TBili  0.6  /  DBili  x   /  AST  14  /  ALT  12  /  AlkPhos  99  06-03  TPro  6.9  /  Alb  4.0  /  TBili  0.6  /  DBili  x   /  AST  19  /  ALT  12  /  AlkPhos  101  06-02      < from: JEAN W or WO Ultrasound Enhancing Agent (12.26.24 @ 15:21) >     CONCLUSIONS:      1. No echocardiographic evidence of vegetations.   2. Left ventricular systolic function is moderately to severely decreased. There are no regional wall motion abnormalities seen.   3.Normal right ventricular cavity size, with normal wall thickness, and normal right ventricular systolic function.   4. Mild aortic stenosis.   5. The peak transaortic velocity is 2.50 m/s, peak transaortic gradient is 25.0 mmHg and mean transaortic gradient is 14.0 mmHg with an LVOT/aortic valve VTI ratio of 0.38. The effective orifice area is estimated at 1.57 cm² by the continuity equation.   6. No pericardial effusion seen.   7. Compared to the transthoracic echocardiogram performed on 12/23/2024, there have been no significant interval changes.    ________________________________________________________________________________________  FINDINGS:     Left Ventricle:  Left ventricular wall thickness is normal. Left ventricular systolic function is moderately to severely decreased with a calculated ejection fraction of 35 % by 3D. There are no regional wall motion abnormalities seen. Unable to assess left ventricular diastolic function due to insufficient data.     Right Ventricle:  The right ventricular cavity is normal in size, with normal wall thickness and right ventricular systolic function is normal.     Right Atrium:  The right atrium is normal in size.     Interatrial Septum:  The interatrial septum appears intact.     AorticValve:  The aortic valve appears trileaflet with reduced systolic excursion. There is calcification of the aortic valve leaflets. There is mild aortic stenosis. The peak transaortic velocity is 2.50 m/s, peak transaortic gradient is 25.0 mmHg and mean transaortic gradient is 14.0 mmHg with an LVOT/aortic valve VTI ratio of 0.38. The aortic valve area is estimated at 1.57 cm² by the continuity equation. There is no evidence of aortic regurgitation.     Mitral Valve:  Structurally normal mitral valve with normal leaflet excursion. There is mitral valve thickening of the anterior and posterior leaflets. There is no mitral valve stenosis. There is trace mitral regurgitation.     Tricuspid Valve:  Structurally normal tricuspid valve with normal leaflet excursion. There is trace tricuspid regurgitation.     Pulmonic Valve:  Structurally normal pulmonic valve with normal leaflet excursion. There is trace pulmonic regurgitation.     Aorta:  The aortic root appears normal in size. There is mild atheroma in the visualized portions of the proximal ascending aorta. There is severe atheroma which measures up to 5.00 mm in the visualized portions of the transverse aortic arch. There is moderate atheroma which measures up to 4.00 mm in the visualized portions of the descending aorta.     Pericardium:  No pericardial effusion seen.     Systemic Veins:  The inferior vena cava is normal in size (normal <2.1cm) with normal inspiratory collapse (normal >50%) consistent with normal right atrial pressure (~3, range 0-5mmHg).  ____________________________________________________________________  QUANTITATIVE DATA:  Left Ventricle Measurements: (Indexed to BSA)     3D LV EF%: 35 %            LVOT / RVOT/ Qp/Qs Data: (Indexed to BSA)  LVOT Diameter: 2.30 cm  LVOT Area:     4.15 cm²  LVOT VTI:      17.00 cm  LVOT SV:       70.6 ml  36.77 ml/m²    Aortic Valve Measurements:  AV Vmax:                2.5 m/s  AV Peak Gradient:       25.0 mmHg  AV Mean Gradient:       14.0 mmHg  AV VTI:45.0 cm  AV VTI Ratio:           0.38  AoV EOA, Contin:        1.57 cm²  AoV EOA, Contin i:      0.82 cm²/m²  AoV Dimensionless Index 0.38       Tricuspid Valve Measurements:     RA Pressure: 3 mmHg    < end of copied text >    < from: Cardiac Cath Lab - Adult (03.18.21 @ 09:35) >  LM:   --  LM: Normal.  LAD:   --  Mid LAD: There was a 60 % stenosis.  CX:   --  OM1: There was a 60 % stenosis.  RCA:   --  Proximal RCA: There was a diffuse 100 % stenosis. 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  COMPLICATIONS: There were no complications.  DIAGNOSTIC RECOMMENDATIONS: Given the CKD, will defer  PCI until after  failure of medical therapy.    < end of copied text >             Date of Admission: 6/2/25    CHIEF COMPLAINT: SOB     HISTORY OF PRESENT ILLNESS:    83 y/o male with PMHX of CAD (LHC 3/18/21 mild LAD 60%, OM1 60%, Prox  ), HFrEF (JEAN 12/26/24 LVEF 35%, trace TR, MR), afib, anemia, renal artery stenosis s/p L renal stent, HTN, prstate CA/ MDS currently on chemotherapy via PICC line, last dose was in mid April.  Pt comes in with complaints of SOB and weight gain. He was found to be hypoxic in ED with O2 sat 80s, was placed on BIPAP. HF consult called on 6/3/25 to help manage care in this patient who is suspected to be in ADHF. Labs show proBNP 6482, lactate 2.5 now improved to 1.5, BUN/Cr 41/2.55, H/H 8.3/25, trop 171/166. CXR shows b/l pleural effusions. Pt still on BIPAP, pt's aide helped answer questions. Pt lives alone with live in aide. Pt has been having orthopnea, PND and LE edema over the past 3 days. Denies PND, CP, palpitations, dizziness, abdominal pain. He admits to eating very salty meals- frozen dinners frequently. He is compliant with taking all HF medications, given to him by his aide.           Allergies    No Known Allergies    Intolerances    	    MEDICATIONS:  aspirin enteric coated 81 milliGRAM(s) Oral daily  bumetanide Injectable 2 milliGRAM(s) IV Push every 8 hours  clopidogrel Tablet 75 milliGRAM(s) Oral daily  heparin   Injectable 5000 Unit(s) SubCutaneous every 8 hours  hydrALAZINE Injectable 10 milliGRAM(s) IV Push every 8 hours  metoprolol tartrate Injectable 5 milliGRAM(s) IV Push every 6 hours PRN        gabapentin 300 milliGRAM(s) Oral two times a day  melatonin 3 milliGRAM(s) Oral at bedtime PRN    pantoprazole    Tablet 40 milliGRAM(s) Oral before breakfast    allopurinol 200 milliGRAM(s) Oral daily  rosuvastatin 10 milliGRAM(s) Oral at bedtime        PAST MEDICAL & SURGICAL HISTORY:  HTN - Hypertension      Hypercholesterolemia      PC (prostate cancer)  s/p surgical resection 3 years ago      Gout      Aneurysm, ascending aorta      H/O prostatectomy      H/O carotid endarterectomy      H/O renal artery stenosis  s/p stent in Left RA      Status post cataract extraction, unspecified laterality          FAMILY HISTORY:  No pertinent family history in first degree relatives        SOCIAL HISTORY:    [x ] Non-smoker  [ ] Smoker  [x ] No Alcohol      REVIEW OF SYSTEMS:  CONSTITUTIONAL: No fever, weight loss, or fatigue  EYES: No eye pain, visual disturbances, or discharge  ENMT:  No difficulty hearing, tinnitus, vertigo; No sinus or throat pain  NECK: No pain or stiffness  RESPIRATORY: No cough, wheezing, chills or hemoptysis; admits to Shortness of Breath  CARDIOVASCULAR: No chest pain, palpitations, passing out, dizziness, has leg swelling  GASTROINTESTINAL: No abdominal or epigastric pain. No nausea, vomiting, or hematemesis; No diarrhea or constipation. No melena or hematochezia.  GENITOURINARY: No dysuria, frequency, hematuria, or incontinence  NEUROLOGICAL: No headaches, memory loss, loss of strength, numbness, or tremors  SKIN: No itching, burning, rashes, or lesions   LYMPH Nodes: No enlarged glands  ENDOCRINE: No heat or cold intolerance; No hair loss  MUSCULOSKELETAL: No joint pain or swelling; No muscle, back, or extremity pain  PSYCHIATRIC: No depression, anxiety, mood swings, or difficulty sleeping  HEME/LYMPH: No easy bruising, or bleeding gums  ALLERY AND IMMUNOLOGIC: No hives or eczema	    [ ] All others negative	  [ ] Unable to obtain    PHYSICAL EXAM:  T(C): 36.3 (06-03-25 @ 12:04), Max: 36.6 (06-02-25 @ 19:14)  HR: 91 (06-03-25 @ 15:49) (61 - 91)  BP: 133/65 (06-03-25 @ 14:37) (117/62 - 141/93)  RR: 18 (06-03-25 @ 12:04) (17 - 24)  SpO2: 98% (06-03-25 @ 15:49) (92% - 100%)  Wt(kg): --  I&O's Summary    02 Jun 2025 07:01  -  03 Jun 2025 07:00  --------------------------------------------------------  IN: 0 mL / OUT: 100 mL / NET: -100 mL        Appearance: Normal, looks younger than stated age   HEENT:   Normal oral mucosa, PERRL, EOMI	  Lymphatic: No lymphadenopathy  Cardiovascular: Normal S1 S2, JVP elevated, No murmurs, 2-3+ b/l LE edema and is warm b/l.   Respiratory: Lungs clear to auscultation	-  Psychiatry: A & O x 3, Mood & affect appropriate  Gastrointestinal:  Soft, Non-tender, + BS	  Skin: No rashes, No ecchymoses, No cyanosis	  Neurologic: Non-focal  Extremities: Normal range of motion, No clubbing, cyanosis   Vascular: Peripheral pulses palpable 2+ bilaterally        LABS:	 	    CBC Full  -  ( 03 Jun 2025 06:20 )  WBC Count : 3.77 K/uL  Hemoglobin : 8.3 g/dL  Hematocrit : 25.0 %  Platelet Count - Automated : 68 K/uL  Mean Cell Volume : 105.9 fL  Mean Cell Hemoglobin : 35.2 pg  Mean Cell Hemoglobin Concentration : 33.2 g/dL  Auto Neutrophil # : x  Auto Lymphocyte # : x  Auto Monocyte # : x  Auto Eosinophil # : x  Auto Basophil # : x  Auto Neutrophil % : x  Auto Lymphocyte % : x  Auto Monocyte % : x  Auto Eosinophil % : x  Auto Basophil % : x    06-03    140  |  102  |  41[H]+ b  ----------------------------<  162[H]  4.4   |  23  |  2.55[H]  06-02    142  |  103  |  42[H]  ----------------------------<  160[H]  4.2   |  22  |  2.45[H]    Ca    7.9[L]      03 Jun 2025 06:20  Ca    7.8[L]      02 Jun 2025 20:07  Phos  5.0     06-03  Mg     2.50     06-03    TPro  6.6  /  Alb  3.8  /  TBili  0.6  /  DBili  x   /  AST  14  /  ALT  12  /  AlkPhos  99  06-03  TPro  6.9  /  Alb  4.0  /  TBili  0.6  /  DBili  x   /  AST  19  /  ALT  12  /  AlkPhos  101  06-02      < from: JEAN W or WO Ultrasound Enhancing Agent (12.26.24 @ 15:21) >     CONCLUSIONS:      1. No echocardiographic evidence of vegetations.   2. Left ventricular systolic function is moderately to severely decreased. There are no regional wall motion abnormalities seen.   3.Normal right ventricular cavity size, with normal wall thickness, and normal right ventricular systolic function.   4. Mild aortic stenosis.   5. The peak transaortic velocity is 2.50 m/s, peak transaortic gradient is 25.0 mmHg and mean transaortic gradient is 14.0 mmHg with an LVOT/aortic valve VTI ratio of 0.38. The effective orifice area is estimated at 1.57 cm² by the continuity equation.   6. No pericardial effusion seen.   7. Compared to the transthoracic echocardiogram performed on 12/23/2024, there have been no significant interval changes.    ________________________________________________________________________________________  FINDINGS:     Left Ventricle:  Left ventricular wall thickness is normal. Left ventricular systolic function is moderately to severely decreased with a calculated ejection fraction of 35 % by 3D. There are no regional wall motion abnormalities seen. Unable to assess left ventricular diastolic function due to insufficient data.     Right Ventricle:  The right ventricular cavity is normal in size, with normal wall thickness and right ventricular systolic function is normal.     Right Atrium:  The right atrium is normal in size.     Interatrial Septum:  The interatrial septum appears intact.     AorticValve:  The aortic valve appears trileaflet with reduced systolic excursion. There is calcification of the aortic valve leaflets. There is mild aortic stenosis. The peak transaortic velocity is 2.50 m/s, peak transaortic gradient is 25.0 mmHg and mean transaortic gradient is 14.0 mmHg with an LVOT/aortic valve VTI ratio of 0.38. The aortic valve area is estimated at 1.57 cm² by the continuity equation. There is no evidence of aortic regurgitation.     Mitral Valve:  Structurally normal mitral valve with normal leaflet excursion. There is mitral valve thickening of the anterior and posterior leaflets. There is no mitral valve stenosis. There is trace mitral regurgitation.     Tricuspid Valve:  Structurally normal tricuspid valve with normal leaflet excursion. There is trace tricuspid regurgitation.     Pulmonic Valve:  Structurally normal pulmonic valve with normal leaflet excursion. There is trace pulmonic regurgitation.     Aorta:  The aortic root appears normal in size. There is mild atheroma in the visualized portions of the proximal ascending aorta. There is severe atheroma which measures up to 5.00 mm in the visualized portions of the transverse aortic arch. There is moderate atheroma which measures up to 4.00 mm in the visualized portions of the descending aorta.     Pericardium:  No pericardial effusion seen.     Systemic Veins:  The inferior vena cava is normal in size (normal <2.1cm) with normal inspiratory collapse (normal >50%) consistent with normal right atrial pressure (~3, range 0-5mmHg).  ____________________________________________________________________  QUANTITATIVE DATA:  Left Ventricle Measurements: (Indexed to BSA)     3D LV EF%: 35 %            LVOT / RVOT/ Qp/Qs Data: (Indexed to BSA)  LVOT Diameter: 2.30 cm  LVOT Area:     4.15 cm²  LVOT VTI:      17.00 cm  LVOT SV:       70.6 ml  36.77 ml/m²    Aortic Valve Measurements:  AV Vmax:                2.5 m/s  AV Peak Gradient:       25.0 mmHg  AV Mean Gradient:       14.0 mmHg  AV VTI:45.0 cm  AV VTI Ratio:           0.38  AoV EOA, Contin:        1.57 cm²  AoV EOA, Contin i:      0.82 cm²/m²  AoV Dimensionless Index 0.38       Tricuspid Valve Measurements:     RA Pressure: 3 mmHg    < end of copied text >    < from: Cardiac Cath Lab - Adult (03.18.21 @ 09:35) >  LM:   --  LM: Normal.  LAD:   --  Mid LAD: There was a 60 % stenosis.  CX:   --  OM1: There was a 60 % stenosis.  RCA:   --  Proximal RCA: There was a diffuse 100 % stenosis. 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  COMPLICATIONS: There were no complications.  DIAGNOSTIC RECOMMENDATIONS: Given the CKD, will defer  PCI until after  failure of medical therapy.    < end of copied text >

## 2025-06-03 NOTE — CONSULT NOTE ADULT - SUBJECTIVE AND OBJECTIVE BOX
Date of Admission:  6/2/25    CHIEF COMPLAINT:  Shortness of Breath    HISTORY OF PRESENT ILLNESS:  Patient seen and examined. Patient is abdominal breathing with BIPAP mask on. He is short of breath. He complains of leg swelling    NKDA  	  MEDICATIONS:  aspirin enteric coated 81 milliGRAM(s) Oral daily  bumetanide Infusion 1 mG/Hr IV Continuous <Continuous>  clopidogrel Tablet 75 milliGRAM(s) Oral daily  heparin   Injectable 5000 Unit(s) SubCutaneous every 8 hours  hydrALAZINE Injectable 10 milliGRAM(s) IV Push every 8 hours  metoprolol tartrate Injectable 5 milliGRAM(s) IV Push every 6 hours PRN  gabapentin 300 milliGRAM(s) Oral two times a day  melatonin 3 milliGRAM(s) Oral at bedtime PRN  pantoprazole    Tablet 40 milliGRAM(s) Oral before breakfast  allopurinol 200 milliGRAM(s) Oral daily  rosuvastatin 10 milliGRAM(s) Oral at bedtime      PAST MEDICAL & SURGICAL HISTORY:  HTN - Hypertension  Hypercholesterolemia  PC (prostate cancer)  s/p surgical resection 3 years ago  Gout  Aneurysm, ascending aorta  H/O prostatectomy  H/O carotid endarterectomy  H/O renal artery stenosis  s/p stent in Left RA  Status post cataract extraction, unspecified laterality    FAMILY HISTORY:  No pertinent family history in first degree relatives    SOCIAL HISTORY:    [ ] Non-smoker  [ ] Smoker  [ ] Alcohol      REVIEW OF SYSTEMS:  CONSTITUTIONAL: endorses weakness  EYES/ENT: No visual changes;  No vertigo or throat pain   NECK: No pain or stiffness  RESPIRATORY: endorses shortness of breath  CARDIOVASCULAR: No chest pain or palpitations  GASTROINTESTINAL: +abdominal distension  GENITOURINARY: No dysuria, frequency or hematuria  NEUROLOGICAL: No numbness or weakness  SKIN: No itching, rashes        T(C): 36.3 (06-03-25 @ 12:04), Max: 36.6 (06-02-25 @ 19:14)  HR: 91 (06-03-25 @ 15:49) (61 - 91)  BP: 133/65 (06-03-25 @ 14:37) (117/62 - 141/93)  RR: 18 (06-03-25 @ 12:04) (17 - 24)  SpO2: 98% (06-03-25 @ 15:49) (92% - 100%)  Wt(kg): --  I&O's Summary    02 Jun 2025 07:01  -  03 Jun 2025 07:00  --------------------------------------------------------  IN: 0 mL / OUT: 100 mL / NET: -100 mL        Physical Exam:  General: NAD  Cardiovascular: Normal S1 S2, No JVD, No murmurs, No edema  Respiratory: Lungs clear to auscultation	  Gastrointestinal:  Soft, Non-tender, + BS	  Skin: warm and dry, No rashes, No ecchymoses, No cyanosis	  Extremities:  No clubbing, cyanosis or edema  Vascular: Peripheral pulses palpable 2+ bilaterally    LABS:	   	    CBC Full  -  ( 03 Jun 2025 06:20 )  WBC Count : 3.77 K/uL  Hemoglobin : 8.3 g/dL  Hematocrit : 25.0 %  Platelet Count - Automated : 68 K/uL  Mean Cell Volume : 105.9 fL  Mean Cell Hemoglobin : 35.2 pg  Mean Cell Hemoglobin Concentration : 33.2 g/dL  Auto Neutrophil # : x  Auto Lymphocyte # : x  Auto Monocyte # : x  Auto Eosinophil # : x  Auto Basophil # : x  Auto Neutrophil % : x  Auto Lymphocyte % : x  Auto Monocyte % : x  Auto Eosinophil % : x  Auto Basophil % : x    06-03    140  |  102  |  41[H]  ----------------------------<  162[H]  4.4   |  23  |  2.55[H]  06-02    142  |  103  |  42[H]  ----------------------------<  160[H]  4.2   |  22  |  2.45[H]    Ca    7.9[L]      03 Jun 2025 06:20  Ca    7.8[L]      02 Jun 2025 20:07  Phos  5.0     06-03  Mg     2.50     06-03    TPro  6.6  /  Alb  3.8  /  TBili  0.6  /  DBili  x   /  AST  14  /  ALT  12  /  AlkPhos  99  06-03  TPro  6.9  /  Alb  4.0  /  TBili  0.6  /  DBili  x   /  AST  19  /  ALT  12  /  AlkPhos  101  06-02      proBNP:   Lipid Profile:   HgA1c:   TSH: Thyroid Stimulating Hormone, Serum: 5.62 uIU/mL (06-03 @ 06:20)        CARDIAC MARKERS:            TELEMETRY: 	    ECG:  	  RADIOLOGY:  OTHER: 	    PREVIOUS DIAGNOSTIC TESTING:    [ ] Echocardiogram:  [ ]  Catheterization:  [ ] Stress Test:  	  	  ASSESSMENT/PLAN: 	    [place] [id number] Date of Admission:  6/2/25    CHIEF COMPLAINT:  Shortness of Breath    SUBJECTIVE/OBJECTIVE:  Patient seen and examined. Patient is abdominal breathing with BIPAP mask on. He is short of breath. He complains of leg swelling and worsening shortness of breath.    HPI:    85 Y/O M PMH MDS, HTN, CKD 3, A Fib, Anemia, HTN, Prostate CA, gout, obesity, presents with 3 days of orthopnea, increasing shortness of breath and weight gain. Pt is unsure of how much weight he has gained but states his body feels swollen. Complains of FOX, PND, inability to sleep or lay flat. Is on lasix 40mg daily and has not missed any doses. Admits to increased salty food intake. SOB progressed over the past 3 days now with increased WOB and decided to present for evaluation after his cardiologist recommending eval in the ED. Pt states he has made the same amount of urine as usual over the past 2 days. Pt was hypoxic to 80% with EMS, denies h/o lung issues and denies h/o supplemental o2 use, denies prior h/o bipap use. Pt denies any other sx or acute complaints. In ED, patient was hypoxic requiring O2 with mildly elevated lactate, belly breathing and was trialed on bipap with improvement. Admit for possible new CHF exacerbation    On interview the patient says his breathing has improved with bipap, feeling a bit better. Aside from SOB, FOX, PND, patient denies fever, chills, cough, headache, chest pain, palpitations, sputum production abd pain, n/v/d/c.   NKDA  	  MEDICATIONS:  aspirin enteric coated 81 milliGRAM(s) Oral daily  bumetanide Infusion 1 mG/Hr IV Continuous <Continuous>  clopidogrel Tablet 75 milliGRAM(s) Oral daily  heparin   Injectable 5000 Unit(s) SubCutaneous every 8 hours  hydrALAZINE Injectable 10 milliGRAM(s) IV Push every 8 hours  metoprolol tartrate Injectable 5 milliGRAM(s) IV Push every 6 hours PRN  gabapentin 300 milliGRAM(s) Oral two times a day  melatonin 3 milliGRAM(s) Oral at bedtime PRN  pantoprazole    Tablet 40 milliGRAM(s) Oral before breakfast  allopurinol 200 milliGRAM(s) Oral daily  rosuvastatin 10 milliGRAM(s) Oral at bedtime      PAST MEDICAL & SURGICAL HISTORY:  HTN - Hypertension  Hypercholesterolemia  PC (prostate cancer)  s/p surgical resection 3 years ago  Gout  Aneurysm, ascending aorta  H/O prostatectomy  H/O carotid endarterectomy  H/O renal artery stenosis  s/p stent in Left RA  Status post cataract extraction, unspecified laterality    FAMILY HISTORY:  No pertinent family history in first degree relatives    SOCIAL HISTORY:    [ ] Non-smoker  [ ] Smoker  [ ] Alcohol      REVIEW OF SYSTEMS:  CONSTITUTIONAL: endorses weakness  EYES/ENT: No visual changes;  No vertigo or throat pain   NECK: No pain or stiffness  RESPIRATORY: endorses shortness of breath  CARDIOVASCULAR: No chest pain or palpitations  GASTROINTESTINAL: +abdominal distension  GENITOURINARY: No dysuria, frequency or hematuria  NEUROLOGICAL: No numbness or weakness  SKIN: No itching, rashes        T(C): 36.3 (06-03-25 @ 12:04), Max: 36.6 (06-02-25 @ 19:14)  HR: 91 (06-03-25 @ 15:49) (61 - 91)  BP: 133/65 (06-03-25 @ 14:37) (117/62 - 141/93)  RR: 18 (06-03-25 @ 12:04) (17 - 24)  SpO2: 98% (06-03-25 @ 15:49) (92% - 100%)  Wt(kg): --  I&O's Summary    02 Jun 2025 07:01  -  03 Jun 2025 07:00  --------------------------------------------------------  IN: 0 mL / OUT: 100 mL / NET: -100 mL        Physical Exam:  General: NAD  Cardiovascular: Normal S1 S2, No JVD, No murmurs, No edema  Respiratory: Lungs clear to auscultation	  Gastrointestinal:  Soft, Non-tender, + BS	  Skin: warm and dry, No rashes, No ecchymoses, No cyanosis	  Extremities:  No clubbing, cyanosis or edema  Vascular: Peripheral pulses palpable 2+ bilaterally    LABS:	   	    CBC Full  -  ( 03 Jun 2025 06:20 )  WBC Count : 3.77 K/uL  Hemoglobin : 8.3 g/dL  Hematocrit : 25.0 %  Platelet Count - Automated : 68 K/uL  Mean Cell Volume : 105.9 fL  Mean Cell Hemoglobin : 35.2 pg  Mean Cell Hemoglobin Concentration : 33.2 g/dL  Auto Neutrophil # : x  Auto Lymphocyte # : x  Auto Monocyte # : x  Auto Eosinophil # : x  Auto Basophil # : x  Auto Neutrophil % : x  Auto Lymphocyte % : x  Auto Monocyte % : x  Auto Eosinophil % : x  Auto Basophil % : x    06-03    140  |  102  |  41[H]  ----------------------------<  162[H]  4.4   |  23  |  2.55[H]  06-02    142  |  103  |  42[H]  ----------------------------<  160[H]  4.2   |  22  |  2.45[H]    Ca    7.9[L]      03 Jun 2025 06:20  Ca    7.8[L]      02 Jun 2025 20:07  Phos  5.0     06-03  Mg     2.50     06-03    TPro  6.6  /  Alb  3.8  /  TBili  0.6  /  DBili  x   /  AST  14  /  ALT  12  /  AlkPhos  99  06-03  TPro  6.9  /  Alb  4.0  /  TBili  0.6  /  DBili  x   /  AST  19  /  ALT  12  /  AlkPhos  101  06-02      TSH: Thyroid Stimulating Hormone, Serum: 5.62 uIU/mL (06-03 @ 06:20)    < from: JEAN W or WO Ultrasound Enhancing Agent (12.26.24 @ 15:21) >     CONCLUSIONS:      1. No echocardiographic evidence of vegetations.   2. Left ventricular systolic function is moderately to severely decreased. There are no regional wall motion abnormalities seen.   3.Normal right ventricular cavity size, with normal wall thickness, and normal right ventricular systolic function.   4. Mild aortic stenosis.   5. The peak transaortic velocity is 2.50 m/s, peak transaortic gradient is 25.0 mmHg and mean transaortic gradient is 14.0 mmHg with an LVOT/aortic valve VTI ratio of 0.38. The effective orifice area is estimated at 1.57 cm² by the continuity equation.   6. No pericardial effusion seen.   7. Compared to the transthoracic echocardiogram performed on 12/23/2024, there have been no significant interval changes.    ________________________________________________________________________________________  FINDINGS:     Left Ventricle:  Left ventricular wall thickness is normal. Left ventricular systolic function is moderately to severely decreased with a calculated ejection fraction of 35 % by 3D. There are no regional wall motion abnormalities seen. Unable to assess left ventricular diastolic function due to insufficient data.     Right Ventricle:  The right ventricular cavity is normal in size, with normal wall thickness and right ventricular systolic function is normal.     Right Atrium:  The right atrium is normal in size.     Interatrial Septum:  The interatrial septum appears intact.     AorticValve:The aortic valve appears trileaflet with reduced systolic excursion. There is calcification of the aortic valve leaflets. There is mild aortic stenosis. The peak transaortic velocity is 2.50 m/s, peak transaortic gradient is 25.0 mmHg and mean transaortic gradient is 14.0 mmHg with an LVOT/aortic valve VTI ratio of 0.38. The aortic valve area is estimated at 1.57 cm² by the continuity equation. There is no evidence of aortic regurgitation.     Mitral Valve:  Structurally normal mitral valve with normal leaflet excursion. There is mitral valve thickening of the anterior and posterior leaflets. There is no mitral valve stenosis. There is trace mitral regurgitation.     Tricuspid Valve:  Structurally normal tricuspid valve with normal leaflet excursion. There is trace tricuspid regurgitation.     Pulmonic Valve:  Structurally normal pulmonic valve with normal leaflet excursion. There is trace pulmonic regurgitation.     Aorta:  The aortic root appears normal in size. There is mild atheroma in the visualized portions of the proximal ascending aorta. There is severe atheroma which measures up to 5.00 mm in the visualized portions of the transverse aortic arch. There is moderate atheroma which measures up to 4.00 mm in the visualized portions of the descending aorta.     Pericardium:  No pericardial effusion seen.     Systemic Veins:  The inferior vena cava is normal in size (normal <2.1cm) with normal inspiratory collapse (normal >50%) consistent with normal right atrial pressure (~3, range 0-5mmHg).  ____________________________________________________________________  QUANTITATIVE DATA:  Left Ventricle Measurements: (Indexed to BSA)     3D LV EF%: 35 %            LVOT / RVOT/ Qp/Qs Data: (Indexed to BSA)  LVOT Diameter: 2.30 cm  LVOT Area:     4.15 cm²  LVOT VTI:      17.00 cm  LVOT SV:       70.6 ml  36.77 ml/m²    Aortic Valve Measurements:  AV Vmax:                2.5 m/s  AV Peak Gradient:       25.0 mmHg  AV Mean Gradient:       14.0 mmHg  AV VTI:45.0 cm  AV VTI Ratio:           0.38  AoV EOA, Contin:        1.57 cm²  AoV EOA, Contin i:      0.82 cm²/m²  AoV Dimensionless Index 0.38       Tricuspid Valve Measurements:     RA Pressure: 3 mmHg    < end of copied text >    < from: Cardiac Cath Lab - Adult (03.18.21 @ 09:35) >  LM:   --  LM: Normal.  LAD:   --  Mid LAD: There was a 60 % stenosis.  CX:   --  OM1: There was a 60 % stenosis.  RCA:   --  Proximal RCA: There was a diffuse 100 % stenosis. 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  COMPLICATIONS: There were no complications.  DIAGNOSTIC RECOMMENDATIONS: Given the CKD, will defer  PCI until after  failure of medical therapy.    < end of copied text >

## 2025-06-03 NOTE — H&P ADULT - PROBLEM SELECTOR PLAN 2
Patient with history of USHA s/p stent, CKD 3 with creatinine baseline around 1.7  - Creatinine on presentation 2.45  - Suspect congestion vs. cardiorenal   - Diuresis as above  - Send urine studies   - Trend I and O  - Renally dose all medications   - one time bladder scan   - Trend CMP Patient presents with worsening volume overload, FOX, PND  - History of reduced ejection fraction on prior echos, last echo 12/16/24 with EF 35% mod reduced LV systolic function  - patient of Gabriel Reddy, home diuresis lasix 40 daily and low salt diet otherwise no GDMT  - Elevated proBNP to 6482, clinically overloaded on exam  - Start IV lasix 40 BID  - Hold home amlodipine  - Start hydral 25 TID for afterload reduction    - Strict I and O  - Daily standing weights   - Monitor on tele  - Repeat TTE  - Cards consult in morning for management of HFrEF

## 2025-06-03 NOTE — H&P ADULT - NSHPLABSRESULTS_GEN_ALL_CORE
LABS: When present labs, imaging, and ECG were personally reviewed                             8.5    4.19  )-----------( 78       ( 2025 20:07 )               25.4       06-02    142  |  103  |  42[H]  ----------------------------<  160[H]  4.2   |  22  |  2.45[H]    Ca    7.8[L]      2025 20:07    TPro  6.9  /  Alb  4.0  /  TBili  0.6  /  DBili  x   /  AST  19  /  ALT  12  /  AlkPhos  101  06-02       LIVER FUNCTIONS - ( 2025 20:07 )  Alb: 4.0 g/dL / Pro: 6.9 g/dL / ALK PHOS: 101 U/L / ALT: 12 U/L / AST: 19 U/L / GGT: x                    Urinalysis Basic - ( 2025 22:07 )    Color: Yellow / Appearance: Clear / S.017 / pH: x  Gluc: x / Ketone: x  / Bili: Negative / Urobili: 0.2 mg/dL   Blood: x / Protein: 100 mg/dL / Nitrite: Negative   Leuk Esterase: Moderate / RBC: 1 /HPF / WBC 44 /HPF   Sq Epi: x / Non Sq Epi: 2 /HPF / Bacteria: Negative /HPF        PT/INR - ( 2025 20:07 )   PT: 13.4 sec;   INR: 1.13 ratio         PTT - ( 2025 20:07 )  PTT:28.4 sec    Lactate Trend      CARDIAC MARKERS ( 2025 20:07 )  x     / x     / x     / x     / 7.3 ng/mL        CAPILLARY BLOOD GLUCOSE                RADIOLOGY & ADDITIONAL TESTS:

## 2025-06-03 NOTE — PATIENT PROFILE ADULT - FALL HARM RISK - HARM RISK INTERVENTIONS

## 2025-06-03 NOTE — H&P ADULT - PROBLEM SELECTOR PLAN 3
Patient with history of AF, managed with metoprolol prior   - CHADSVASc of 5  - Off AC currently   - No longer on rate control therapy  - Monitor on tele   - Low threshold to start metoprolol Patient with history of AF, managed with metoprolol prior   - CHADSVASc of 5  - Off AC currently   - No longer on rate control therapy  - Monitor on tele   - IV lopressor 5mg q6 for rate control until outpatient regimen confirmed in morning Patient with history of USHA s/p stent, CKD 3 with creatinine baseline around 1.7  - Creatinine on presentation 2.45  - Suspect congestion vs. cardiorenal   - Diuresis as above  - Send urine studies   - Trend I and O  - Renally dose all medications   - one time bladder scan   - Trend CMP

## 2025-06-03 NOTE — PATIENT PROFILE ADULT - FUNCTIONAL ASSESSMENT - DAILY ACTIVITY 2.
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    Anesthesia Type:   MAC     Note:    Oropharynx: oropharynx clear of all foreign objects and spontaneously breathing  Level of Consciousness: drowsy  Oxygen Supplementation: face mask  Level of Supplemental Oxygen (L/min / FiO2): 6  Independent Airway: airway patency satisfactory and stable  Dentition: dentition unchanged  Vital Signs Stable: post-procedure vital signs reviewed and stable  Report to RN Given: handoff report given  Patient transferred to: Phase II    Handoff Report: Identifed the Patient, Identified the Reponsible Provider, Reviewed the pertinent medical history, Discussed the surgical course, Reviewed Intra-OP anesthesia mangement and issues during anesthesia, Set expectations for post-procedure period and Allowed opportunity for questions and acknowledgement of understanding      Vitals:  Vitals Value Taken Time   BP 91/75 02/16/22 1317   Temp 37    Pulse 70 02/16/22 1317   Resp 10    SpO2 99 % 02/16/22 1321   Vitals shown include unvalidated device data.    Electronically Signed By: Josefina Snowden  February 16, 2022  1:23 PM   2 = A lot of assistance

## 2025-06-03 NOTE — CONSULT NOTE ADULT - PROBLEM SELECTOR RECOMMENDATION 9
Stage C HF, NYHA class IV-severely hypervolemic and hypoxic requiring BIPAP.  Normotensive.   Move to CCU.   Start Bumex 1 mg/hr immediately.  In CCU should be put on NTG gtt at 10 mcg/min with rapid uptitration, as tolerated.  Keep K 4.0-5.0 and mag >2.0.   Daily standing weights.   Strict I/O.   Will add oral GDMT when able to take PO.   Informed primary team of the above.  Repeat echo to assess aortic stenosis.  Would ideal investigate coronary disease further, but pt has active CA and on chemo, with JUAN RAMON. Can consider ischemic eval.

## 2025-06-03 NOTE — CONSULT NOTE ADULT - ASSESSMENT
83 Y/O M PMH MDS, HTN, CKD 3, A Fib, Anemia, HTN, Prostate CA, gout, obesity, presents with 3 days of orthopnea, increasing shortness of breath and weight gain  CHF   CKD stage 4   Bl effusion     1 Renal- Will dc lasix and try Bumex 2mg IVP tid and also add zaroxolyn to the mix   Trend serum creatinine and strict ins and outs   2 CVS- BP is stable and not on BBlockers at present  Recent echo with severely reduced EF  3 Pulm- On bipap and try aggressive fluid removal     [facility]   [phone]

## 2025-06-03 NOTE — H&P ADULT - PROBLEM SELECTOR PLAN 4
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

## 2025-06-03 NOTE — H&P ADULT - PROBLEM SELECTOR PLAN 6
- Cont home allopurinol remote history of prostate cancer  - Last PSA negative  - F/u outpatient with urology    #Recurrent UTI  - Was on chronic suppressive abx in past  - Follow up with aid in morning to confirm if on chronic cipro vs. levoflox   - F/u urine culture

## 2025-06-03 NOTE — H&P ADULT - PROBLEM SELECTOR PLAN 1
Patient presents with worsening volume overload, FOX, PND  - History of reduced ejection fraction on prior echos, last echo 12/16/24 with EF 35% mod reduced LV systolic function  - patient of Gabriel Reddy, home diuresis lasix 40 daily and low salt diet otherwise no GDMT  - Elevated proBNP to 6482, clinically overloaded on exam  - Start IV lasix 40 BID  - Hold home amlodipine  - Start hydral 25 TID for afterload reduction    - Strict I and O  - Daily standing weights   - Monitor on tele Patient presents with worsening volume overload, FOX, PND  - History of reduced ejection fraction on prior echos, last echo 12/16/24 with EF 35% mod reduced LV systolic function  - patient of Gabriel Reddy, home diuresis lasix 40 daily and low salt diet otherwise no GDMT  - Elevated proBNP to 6482, clinically overloaded on exam  - Start IV lasix 40 BID  - Hold home amlodipine  - Start hydral 25 TID for afterload reduction    - Strict I and O  - Daily standing weights   - Monitor on tele  - Repeat TTE  - Cards consult in morning for management of HFrEF -patient desaturated to 80's in field with EMS, noted to be hypoxic in ED and placed on Bipap  -MICU consulted, not candidate at this time, can continue on 10/6 with 60% FIO2.  -cultures obtained to r/o infectious etiology though given presentation more likely i/s/o CHF.  -will keep on Bipap overnight, monitor O2 status.

## 2025-06-04 ENCOUNTER — RESULT REVIEW (OUTPATIENT)
Age: 85
End: 2025-06-04

## 2025-06-04 LAB
A1C WITH ESTIMATED AVERAGE GLUCOSE RESULT: 5.5 % — SIGNIFICANT CHANGE UP (ref 4–5.6)
ALBUMIN SERPL ELPH-MCNC: 3.6 G/DL — SIGNIFICANT CHANGE UP (ref 3.3–5)
ALP SERPL-CCNC: 91 U/L — SIGNIFICANT CHANGE UP (ref 40–120)
ALT FLD-CCNC: 7 U/L — SIGNIFICANT CHANGE UP (ref 4–41)
ANION GAP SERPL CALC-SCNC: 14 MMOL/L — SIGNIFICANT CHANGE UP (ref 7–14)
ANION GAP SERPL CALC-SCNC: 15 MMOL/L — HIGH (ref 7–14)
APTT BLD: 28.1 SEC — SIGNIFICANT CHANGE UP (ref 26.1–36.8)
AST SERPL-CCNC: 13 U/L — SIGNIFICANT CHANGE UP (ref 4–40)
BASOPHILS # BLD AUTO: 0.01 K/UL — SIGNIFICANT CHANGE UP (ref 0–0.2)
BASOPHILS # BLD AUTO: 0.01 K/UL — SIGNIFICANT CHANGE UP (ref 0–0.2)
BASOPHILS NFR BLD AUTO: 0.4 % — SIGNIFICANT CHANGE UP (ref 0–2)
BASOPHILS NFR BLD AUTO: 0.4 % — SIGNIFICANT CHANGE UP (ref 0–2)
BILIRUB SERPL-MCNC: 0.6 MG/DL — SIGNIFICANT CHANGE UP (ref 0.2–1.2)
BLOOD GAS ARTERIAL COMPREHENSIVE RESULT: SIGNIFICANT CHANGE UP
BUN SERPL-MCNC: 40 MG/DL — HIGH (ref 7–23)
BUN SERPL-MCNC: 41 MG/DL — HIGH (ref 7–23)
CALCIUM SERPL-MCNC: 8 MG/DL — LOW (ref 8.4–10.5)
CALCIUM SERPL-MCNC: 8.1 MG/DL — LOW (ref 8.4–10.5)
CHLORIDE SERPL-SCNC: 105 MMOL/L — SIGNIFICANT CHANGE UP (ref 98–107)
CHLORIDE SERPL-SCNC: 105 MMOL/L — SIGNIFICANT CHANGE UP (ref 98–107)
CHOLEST SERPL-MCNC: 91 MG/DL — SIGNIFICANT CHANGE UP
CO2 SERPL-SCNC: 23 MMOL/L — SIGNIFICANT CHANGE UP (ref 22–31)
CO2 SERPL-SCNC: 24 MMOL/L — SIGNIFICANT CHANGE UP (ref 22–31)
CORTIS AM PEAK SERPL-MCNC: 11.9 UG/DL — SIGNIFICANT CHANGE UP (ref 6–18.4)
CREAT SERPL-MCNC: 2.51 MG/DL — HIGH (ref 0.5–1.3)
CREAT SERPL-MCNC: 2.54 MG/DL — HIGH (ref 0.5–1.3)
EGFR: 24 ML/MIN/1.73M2 — LOW
EGFR: 24 ML/MIN/1.73M2 — LOW
EGFR: 25 ML/MIN/1.73M2 — LOW
EGFR: 25 ML/MIN/1.73M2 — LOW
EOSINOPHIL # BLD AUTO: 0.08 K/UL — SIGNIFICANT CHANGE UP (ref 0–0.5)
EOSINOPHIL # BLD AUTO: 0.08 K/UL — SIGNIFICANT CHANGE UP (ref 0–0.5)
EOSINOPHIL NFR BLD AUTO: 2.9 % — SIGNIFICANT CHANGE UP (ref 0–6)
EOSINOPHIL NFR BLD AUTO: 3.2 % — SIGNIFICANT CHANGE UP (ref 0–6)
ESTIMATED AVERAGE GLUCOSE: 111 — SIGNIFICANT CHANGE UP
GLUCOSE SERPL-MCNC: 120 MG/DL — HIGH (ref 70–99)
GLUCOSE SERPL-MCNC: 129 MG/DL — HIGH (ref 70–99)
HCT VFR BLD CALC: 20.8 % — CRITICAL LOW (ref 39–50)
HCT VFR BLD CALC: 22 % — LOW (ref 39–50)
HDLC SERPL-MCNC: 26 MG/DL — LOW
HGB BLD-MCNC: 7.1 G/DL — LOW (ref 13–17)
HGB BLD-MCNC: 7.4 G/DL — LOW (ref 13–17)
IANC: 1.61 K/UL — LOW (ref 1.8–7.4)
IANC: 1.74 K/UL — LOW (ref 1.8–7.4)
IMM GRANULOCYTES NFR BLD AUTO: 1.1 % — HIGH (ref 0–0.9)
IMM GRANULOCYTES NFR BLD AUTO: 1.2 % — HIGH (ref 0–0.9)
INR BLD: 1.2 RATIO — HIGH (ref 0.85–1.16)
LDLC SERPL-MCNC: 34 MG/DL — SIGNIFICANT CHANGE UP
LIPID PNL WITH DIRECT LDL SERPL: 34 MG/DL — SIGNIFICANT CHANGE UP
LYMPHOCYTES # BLD AUTO: 0.68 K/UL — LOW (ref 1–3.3)
LYMPHOCYTES # BLD AUTO: 0.74 K/UL — LOW (ref 1–3.3)
LYMPHOCYTES # BLD AUTO: 27 % — SIGNIFICANT CHANGE UP (ref 13–44)
LYMPHOCYTES # BLD AUTO: 27.1 % — SIGNIFICANT CHANGE UP (ref 13–44)
MAGNESIUM SERPL-MCNC: 2.4 MG/DL — SIGNIFICANT CHANGE UP (ref 1.6–2.6)
MAGNESIUM SERPL-MCNC: 2.4 MG/DL — SIGNIFICANT CHANGE UP (ref 1.6–2.6)
MCHC RBC-ENTMCNC: 33.6 G/DL — SIGNIFICANT CHANGE UP (ref 32–36)
MCHC RBC-ENTMCNC: 34.1 G/DL — SIGNIFICANT CHANGE UP (ref 32–36)
MCHC RBC-ENTMCNC: 35.4 PG — HIGH (ref 27–34)
MCHC RBC-ENTMCNC: 35.7 PG — HIGH (ref 27–34)
MCV RBC AUTO: 104.5 FL — HIGH (ref 80–100)
MCV RBC AUTO: 105.3 FL — HIGH (ref 80–100)
MONOCYTES # BLD AUTO: 0.11 K/UL — SIGNIFICANT CHANGE UP (ref 0–0.9)
MONOCYTES # BLD AUTO: 0.13 K/UL — SIGNIFICANT CHANGE UP (ref 0–0.9)
MONOCYTES NFR BLD AUTO: 4.4 % — SIGNIFICANT CHANGE UP (ref 2–14)
MONOCYTES NFR BLD AUTO: 4.8 % — SIGNIFICANT CHANGE UP (ref 2–14)
MRSA PCR RESULT.: SIGNIFICANT CHANGE UP
NEUTROPHILS # BLD AUTO: 1.61 K/UL — LOW (ref 1.8–7.4)
NEUTROPHILS # BLD AUTO: 1.74 K/UL — LOW (ref 1.8–7.4)
NEUTROPHILS NFR BLD AUTO: 63.7 % — SIGNIFICANT CHANGE UP (ref 43–77)
NEUTROPHILS NFR BLD AUTO: 63.8 % — SIGNIFICANT CHANGE UP (ref 43–77)
NONHDLC SERPL-MCNC: 65 MG/DL — SIGNIFICANT CHANGE UP
NRBC # BLD AUTO: 0 K/UL — SIGNIFICANT CHANGE UP (ref 0–0)
NRBC # BLD AUTO: 0 K/UL — SIGNIFICANT CHANGE UP (ref 0–0)
NRBC # FLD: 0 K/UL — SIGNIFICANT CHANGE UP (ref 0–0)
NRBC # FLD: 0 K/UL — SIGNIFICANT CHANGE UP (ref 0–0)
NRBC BLD AUTO-RTO: 0 /100 WBCS — SIGNIFICANT CHANGE UP (ref 0–0)
NRBC BLD AUTO-RTO: 0 /100 WBCS — SIGNIFICANT CHANGE UP (ref 0–0)
PHOSPHATE SERPL-MCNC: 4.1 MG/DL — SIGNIFICANT CHANGE UP (ref 2.5–4.5)
PHOSPHATE SERPL-MCNC: 4.4 MG/DL — SIGNIFICANT CHANGE UP (ref 2.5–4.5)
PLATELET # BLD AUTO: 52 K/UL — LOW (ref 150–400)
PLATELET # BLD AUTO: 56 K/UL — LOW (ref 150–400)
POTASSIUM SERPL-MCNC: 3.6 MMOL/L — SIGNIFICANT CHANGE UP (ref 3.5–5.3)
POTASSIUM SERPL-MCNC: 3.7 MMOL/L — SIGNIFICANT CHANGE UP (ref 3.5–5.3)
POTASSIUM SERPL-SCNC: 3.6 MMOL/L — SIGNIFICANT CHANGE UP (ref 3.5–5.3)
POTASSIUM SERPL-SCNC: 3.7 MMOL/L — SIGNIFICANT CHANGE UP (ref 3.5–5.3)
PROT SERPL-MCNC: 6.2 G/DL — SIGNIFICANT CHANGE UP (ref 6–8.3)
PROTHROM AB SERPL-ACNC: 14.2 SEC — HIGH (ref 9.9–13.4)
RBC # BLD: 1.99 M/UL — LOW (ref 4.2–5.8)
RBC # BLD: 2.09 M/UL — LOW (ref 4.2–5.8)
RBC # FLD: 21.1 % — HIGH (ref 10.3–14.5)
RBC # FLD: 21.3 % — HIGH (ref 10.3–14.5)
S AUREUS DNA NOSE QL NAA+PROBE: SIGNIFICANT CHANGE UP
SODIUM SERPL-SCNC: 142 MMOL/L — SIGNIFICANT CHANGE UP (ref 135–145)
SODIUM SERPL-SCNC: 144 MMOL/L — SIGNIFICANT CHANGE UP (ref 135–145)
TRIGL SERPL-MCNC: 191 MG/DL — HIGH
WBC # BLD: 2.52 K/UL — LOW (ref 3.8–10.5)
WBC # BLD: 2.73 K/UL — LOW (ref 3.8–10.5)
WBC # FLD AUTO: 2.52 K/UL — LOW (ref 3.8–10.5)
WBC # FLD AUTO: 2.73 K/UL — LOW (ref 3.8–10.5)

## 2025-06-04 PROCEDURE — 99292 CRITICAL CARE ADDL 30 MIN: CPT

## 2025-06-04 PROCEDURE — 99291 CRITICAL CARE FIRST HOUR: CPT

## 2025-06-04 PROCEDURE — 71045 X-RAY EXAM CHEST 1 VIEW: CPT | Mod: 26

## 2025-06-04 PROCEDURE — 93306 TTE W/DOPPLER COMPLETE: CPT | Mod: 26

## 2025-06-04 PROCEDURE — 93010 ELECTROCARDIOGRAM REPORT: CPT

## 2025-06-04 PROCEDURE — 99233 SBSQ HOSP IP/OBS HIGH 50: CPT

## 2025-06-04 RX ORDER — GABAPENTIN 400 MG/1
300 CAPSULE ORAL THREE TIMES A DAY
Refills: 0 | Status: DISCONTINUED | OUTPATIENT
Start: 2025-06-04 | End: 2025-06-04

## 2025-06-04 RX ORDER — BUMETANIDE 1 MG/1
1 TABLET ORAL
Qty: 20 | Refills: 0 | Status: DISCONTINUED | OUTPATIENT
Start: 2025-06-04 | End: 2025-06-06

## 2025-06-04 RX ORDER — APIXABAN 2.5 MG/1
TABLET, FILM COATED ORAL
Refills: 0 | Status: DISCONTINUED | OUTPATIENT
Start: 2025-06-04 | End: 2025-06-04

## 2025-06-04 RX ORDER — APIXABAN 2.5 MG/1
TABLET, FILM COATED ORAL
Refills: 0 | Status: DISCONTINUED | OUTPATIENT
Start: 2025-06-04 | End: 2025-06-13

## 2025-06-04 RX ORDER — APIXABAN 2.5 MG/1
2.5 TABLET, FILM COATED ORAL
Refills: 0 | Status: DISCONTINUED | OUTPATIENT
Start: 2025-06-04 | End: 2025-06-13

## 2025-06-04 RX ORDER — APIXABAN 2.5 MG/1
2.5 TABLET, FILM COATED ORAL ONCE
Refills: 0 | Status: COMPLETED | OUTPATIENT
Start: 2025-06-04 | End: 2025-06-04

## 2025-06-04 RX ORDER — LEVOFLOXACIN 25 MG/ML
1 SOLUTION ORAL
Refills: 0 | DISCHARGE

## 2025-06-04 RX ADMIN — CLOPIDOGREL BISULFATE 75 MILLIGRAM(S): 75 TABLET, FILM COATED ORAL at 11:41

## 2025-06-04 RX ADMIN — Medication 10 MILLIGRAM(S): at 20:55

## 2025-06-04 RX ADMIN — HEPARIN SODIUM 5000 UNIT(S): 1000 INJECTION INTRAVENOUS; SUBCUTANEOUS at 05:51

## 2025-06-04 RX ADMIN — NITROGLYCERIN 3 MICROGRAM(S)/MIN: 20 OINTMENT TOPICAL at 07:42

## 2025-06-04 RX ADMIN — APIXABAN 2.5 MILLIGRAM(S): 2.5 TABLET, FILM COATED ORAL at 11:42

## 2025-06-04 RX ADMIN — Medication 1 APPLICATION(S): at 06:15

## 2025-06-04 RX ADMIN — NITROGLYCERIN 3 MICROGRAM(S)/MIN: 20 OINTMENT TOPICAL at 12:37

## 2025-06-04 RX ADMIN — APIXABAN 2.5 MILLIGRAM(S): 2.5 TABLET, FILM COATED ORAL at 17:58

## 2025-06-04 RX ADMIN — Medication 40 MILLIEQUIVALENT(S): at 17:59

## 2025-06-04 RX ADMIN — Medication 25 MILLIGRAM(S): at 17:58

## 2025-06-04 RX ADMIN — Medication 25 MILLIGRAM(S): at 09:37

## 2025-06-04 RX ADMIN — Medication 100 MILLIEQUIVALENT(S): at 05:51

## 2025-06-04 RX ADMIN — Medication 200 MILLIGRAM(S): at 11:41

## 2025-06-04 RX ADMIN — GABAPENTIN 300 MILLIGRAM(S): 400 CAPSULE ORAL at 17:58

## 2025-06-04 RX ADMIN — NITROGLYCERIN 3 MICROGRAM(S)/MIN: 20 OINTMENT TOPICAL at 20:54

## 2025-06-04 RX ADMIN — GABAPENTIN 300 MILLIGRAM(S): 400 CAPSULE ORAL at 09:37

## 2025-06-04 RX ADMIN — BUMETANIDE 5 MG/HR: 1 TABLET ORAL at 10:17

## 2025-06-04 RX ADMIN — BUMETANIDE 10 MG/HR: 1 TABLET ORAL at 20:54

## 2025-06-04 NOTE — PROGRESS NOTE ADULT - ASSESSMENT
83 Y/O M PMH MDS, HTN, CKD 3, A Fib, Anemia, HTN, Prostate CA, gout, obesity, presents with 3 days of orthopnea, increasing shortness of breath and weight gain  CHF   CKD stage 4   Bl effusion     1 Renal- renal fxn stable, now on Bumex gtt at 1mg/hr   Trend serum creatinine and strict ins and outs   A/w KCl 20 mEq IV x 1   2 CVS- BP is stable, on nitro gtt and not on BBlockers at present  Repeat echo noted  HF following   3 Pulm- On bipap and continue aggressive fluid removal     Sayed [facility]   [phone]

## 2025-06-04 NOTE — PROGRESS NOTE ADULT - ASSESSMENT
85 y/o male with PMHX of CAD (LHC 3/18/21 mild LAD 60%, OM1 60%, Prox  ), HFrEF (JEAN 12/26/24 LVEF 35%, trace TR, MR), afib, anemia, renal artery stenosis s/p L renal stent, HTN, prstate CA/ MDS currently on chemotherapy via PICC line, last dose was in mid April.  Pt comes in with complaints of SOB and weight gain. He was found to be hypoxic in ED with O2 sat 80s, was placed on BIPAP. HF consult called on 6/3/25 to help manage care in this patient who is suspected to be in ADHF. Labs show proBNP 6482, lactate 2.5 now improved to 1.5, BUN/Cr 41/2.55, H/H 8.3/25, trop 171/166. CXR shows b/l pleural effusions. Pt still on BIPAP, pt's aide helped answer questions. Removing BIPAP sat dropped to 71%. Pt lives alone with live in aide. He admits to eating very salty meals- frozen dinners frequently. He is compliant with taking all HF medications, given to him by his aide. Overall stage C HF, NYHA class IV-severely hypervolemic and hypoxic requiring BIPAP.      NEURO:  ALert and oriented x 3    RESPIRATORY:  SOB:  Continue BIPAP 16/6 FIO2 90%    CARDIAC:  #Acute on chronic systolic congestive heart failure.   Admit to CCU for bumex gtt  Stage C HF, NYHA class IV-severely hypervolemic and hypoxic requiring BIPAP.  Initiate  Bumex 1 mg/hr immediately.  In CCU should be put on NTG gtt at 10 mcg/min with rapid uptitration, as tolerated.  Keep K 4.0-5.0 and mag >2.0.   Daily standing weights.   Strict I/O.   Repeat echo ordered to assess aortic stenosis.  Consider ischemic eval if patient stabilizes  Creatinine elevated      Problem/Plan - 1:  ·  Problem: Acute hypoxic respiratory failure.  ·  Plan: -patient desaturated to 80's in field with EMS, noted to be hypoxic in ED and placed on Bipap  -MICU consulted, not candidate at this time, can continue on 10/6 with 60% FIO2.  -cultures obtained to r/o infectious etiology though given presentation more likely i/s/o CHF.  -will keep on Bipap overnight, monitor O2 status.    Problem/Plan - 2:  ·  Problem: Acute exacerbation of CHF (congestive heart failure).  ·  Plan: Patient presents with worsening volume overload, FOX, PND  - History of reduced ejection fraction on prior echos, last echo 12/16/24 with EF 35% mod reduced LV systolic function  - patient of Gabriel Reddy, home diuresis lasix 40 daily and low salt diet otherwise no GDMT  - Elevated proBNP to 6482, clinically overloaded on exam  - Start IV lasix 40 BID  - Hold home amlodipine  - Start hydral 25 TID for afterload reduction    - Strict I and O  - Daily standing weights   - Monitor on tele  - Repeat TTE  - Cards consult in morning for management of HFrEF.    Problem/Plan - 3:  ·  Problem: Acute kidney injury superimposed on CKD.  ·  Plan: Patient with history of USHA s/p stent, CKD 3 with creatinine baseline around 1.7  - Creatinine on presentation 2.45  - Suspect congestion vs. cardiorenal   - Diuresis as above  - Send urine studies   - Trend I and O  - Renally dose all medications   - one time bladder scan   - Trend CMP.    Problem/Plan - 4:  ·  Problem: Chronic atrial fibrillation.  ·  Plan: Patient with history of AF, managed with metoprolol prior   - CHADSVASc of 5  - Off AC currently   - No longer on rate control therapy  - Monitor on tele   - IV lopressor 5mg q6 for rate control until outpatient regimen confirmed in morning.    Problem/Plan - 5:  ·  Problem: MDS (myelodysplastic syndrome).  ·  Plan: 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    Problem/Plan - 6:  ·  Problem: Prostate cancer.  ·  Plan: remote history of prostate cancer  - Last PSA negative  - F/u outpatient with urology    #Recurrent UTI  - Was on chronic suppressive abx in past  - Follow up with aid in morning to confirm if on chronic cipro vs. levoflox   - F/u urine culture.    Problem/Plan - 7:  ·  Problem: Gout.  ·  Plan: - Cont home allopurinol.    Problem/Plan - 8:  ·  Problem: History of peripheral arterial disease.  ·  Plan: - Cont home crestor.    Problem/Plan - 9:  ·  Problem: H/O renal artery stenosis.  ·  Plan: s/p L renal stent , follows with Dr. Reddy  - Cont home ASA and plavix.    Problem/Plan - 10:  ·  Problem: Encounter for medication review.  ·  Plan; Patient unable to give medication list, unable to reach home aid  - Follow up with patient's aid to confirm medication list in morning.    Problem/Plan - 11:  ·  Problem: Prophylactic measure.   ·  Plan: DVT: HSQ  Diet: Dash / Renal   Dispo: Pending medical workup.   85 y/o male with PMHX of CAD (LHC 3/18/21 mild LAD 60%, OM1 60%, Prox  ), HFrEF (JEAN 12/26/24 LVEF 35%, trace TR, MR), afib, anemia, renal artery stenosis s/p L renal stent, HTN, prstate CA/ MDS currently on chemotherapy via PICC line, last dose was in mid April.  Pt comes in with complaints of SOB and weight gain. He was found to be hypoxic in ED with O2 sat 80s, was placed on BIPAP. HF consult called on 6/3/25 to help manage care in this patient who is suspected to be in ADHF. Labs show proBNP 6482, lactate 2.5 now improved to 1.5, BUN/Cr 41/2.55, H/H 8.3/25, trop 171/166. CXR shows b/l pleural effusions. Pt still on BIPAP, pt's aide helped answer questions. Removing BIPAP sat dropped to 71%. Pt lives alone with live in aide. He admits to eating very salty meals- frozen dinners frequently. He is compliant with taking all HF medications, given to him by his aide. Overall stage C HF, NYHA class IV-severely hypervolemic and hypoxic requiring BIPAP.    Neuro:  Alert and oriented x 3    RESPIRATORY:  #Acute Hypoxic Respiratory Failure on BIPAP  -ABG this am 7.46/37/75/26/96.8  -BIPAP setting changed after the blood gas to 12/6 FIO2 60%  -continue bipap today, patient desaturates quickly when BIPAP is removed  -titrate the BIPAP off as he diureses and try to transition to nasal cannula  -maintain SPO2 >90    CARDIAC:  #Aortic Stenosis  -ECHO reveals significant Aortic stenosis  -will need eventual JEAN once euvolemic and stabilized  -Left ventricular systolic function is mildly to moderately decreased with an ejection fraction visually estimated at 40 to 45%. There are regional wall motion abnormalities present. Hypokinesis of the inferior and inferolateral walls.  The aortic valve anatomy cannot be determined. There iscalcification of the aortic valve leaflets. The peak transaortic velocity is 3.51 m/s, peak transaortic gradient is 49.3 mmHg and mean transaortic gradient is 26.0 mmHg with an LVOT/aortic valve VTI ratio of 0.25. The gross appearance of the aortic valve is consistent with significant aortic stenosis. However, unable to accurately estimate the aortic valve area. There is mild aortic regurgitation.       83 y/o male with PMHX of CAD (LHC 3/18/21 mild LAD 60%, OM1 60%, Prox  ), HFrEF (JEAN 12/26/24 LVEF 35%, trace TR, MR), afib, anemia, renal artery stenosis s/p L renal stent, HTN, prstate CA/ MDS currently on chemotherapy via PICC line, last dose was in mid April.  Pt comes in with complaints of SOB and weight gain. He was found to be hypoxic in ED with O2 sat 80s, was placed on BIPAP. HF consult called on 6/3/25 to help manage care in this patient who is suspected to be in ADHF. Labs show proBNP 6482, lactate 2.5 now improved to 1.5, BUN/Cr 41/2.55, H/H 8.3/25, trop 171/166. CXR shows b/l pleural effusions. Pt still on BIPAP, pt's aide helped answer questions. Removing BIPAP sat dropped to 71%. Pt lives alone with live in aide. He admits to eating very salty meals- frozen dinners frequently. He is compliant with taking all HF medications, given to him by his aide. Overall stage C HF, NYHA class IV-severely hypervolemic and hypoxic requiring BIPAP.    Neuro:  Alert and oriented x 3    RESPIRATORY:  #Acute Hypoxic Respiratory Failure on BIPAP  -ABG this am 7.46/37/75/26/96.8  -BIPAP setting changed after the blood gas to 12/6 FIO2 60%  -continue bipap today, patient desaturates quickly when BIPAP is removed  -titrate the BIPAP off as he diureses and try to transition to nasal cannula  -maintain SPO2 >90    CARDIAC:  #Aortic Stenosis  -ECHO reveals significant Aortic stenosis  -will need eventual JEAN once euvolemic and stabilized  -Left ventricular systolic function is mildly to moderately decreased with an ejection fraction visually estimated at 40 to 45%. There are regional wall motion abnormalities present. Hypokinesis of the inferior and inferolateral walls.  The aortic valve anatomy cannot be determined. There iscalcification of the aortic valve leaflets. The peak transaortic velocity is 3.51 m/s, peak transaortic gradient is 49.3 mmHg and mean transaortic gradient is 26.0 mmHg with an LVOT/aortic valve VTI ratio of 0.25. The gross appearance of the aortic valve is consistent with significant aortic stenosis. However, unable to accurately estimate the aortic valve area. There is mild aortic regurgitation.      #CAD:  Left heart Cath is 2021  -Findings LM normal, LAD 60%, CX -OM1 with 60%, %   -discontinued aspirin, (for eliquis) and continue plavix    #Acute Decompensated Heart Failure Stage C NYHA class IV  -Volume Overloaded on Exam  -Heart Failure is following  -Continue bumex gtt at 1mg/hr  -net negative 2265cc  -Titrate nitro gtt  -continue hydralazine 25 tid  -strict intake and output  -daily weight  -will likely need an ischemic evaluation at some point if optimized (possibly LHC/RHC once optimized)  -will wait further HF recommendations    #Paroxysmal Atrial Fibrillation  -Does not take eliquis at home  -will confirm with EP this is paroxysmal atrial fibrillation, has hx of Atrial tachycardia  -discontinued aspirin and placed on eliquis 2.5 bid  -CHADSVASC is 7  -holding BB for now in setting of heart failure    GI:  Abdominal distension in setting of SOB and belly breathing  No further  issues  NPO while on the BIPAP    :  #Acute Kidney Injury superimposed on CKD stage 3  -Renal artery stenosis s/p stent  -Creatinine is 2.51 today  -continue plavix for USHA stent  -renally dose medications  -avoid nephrotoxic agents  -continue diuresis    #Urinary retention:  -baldder scan revealed >600  -indwelling catheter placed    HEME/ONC:  #Myelodysplastic Syndrome  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    #History of Prostate Cancer:  -last PSA negative  -follow up with urology    #Recurrent UTI  -will ask his Aide if he takes chronic antibiotics  -follow up with urine culture    RHEUMATOLOGY:  #Gout  -continue allopurinol    VASCULAR:  #Peripheral Artery Disease:  -continue crestor  #Renal Artery Stenosis  -Follows with Dr. Gabriel Reddy  -continue plavix    VTE PPX:  eliquis initiated    ****MEDICATION RECONCILLIATION IN PROGRESS, HAVE TO SPEAK TO HIS AIDE TODAY**   83 y/o male with PMHX of CAD (LHC 3/18/21 mild LAD 60%, OM1 60%, Prox  ), HFrEF (JEAN 12/26/24 LVEF 35%, trace TR, MR), afib, anemia, renal artery stenosis s/p L renal stent, HTN, prstate CA/ MDS currently on chemotherapy via PICC line, last dose was in mid April.  Pt comes in with complaints of SOB and weight gain. He was found to be hypoxic in ED with O2 sat 80s, was placed on BIPAP. HF consult called on 6/3/25 to help manage care in this patient who is suspected to be in ADHF. Labs show proBNP 6482, lactate 2.5 now improved to 1.5, BUN/Cr 41/2.55, H/H 8.3/25, trop 171/166. CXR shows b/l pleural effusions. Pt still on BIPAP, pt's aide helped answer questions. Removing BIPAP sat dropped to 71%. Pt lives alone with live in aide. He admits to eating very salty meals- frozen dinners frequently. He is compliant with taking all HF medications, given to him by his aide. Overall stage C HF, NYHA class IV-severely hypervolemic and hypoxic requiring BIPAP.    Neuro:  Alert and oriented x 3    RESPIRATORY:  #Acute Hypoxic Respiratory Failure on BIPAP  -ABG this am 7.46/37/75/26/96.8  -BIPAP setting changed after the blood gas to 12/6 FIO2 60%  -continue bipap today, patient desaturates quickly when BIPAP is removed  -titrate the BIPAP off as he diureses and try to transition to nasal cannula  -maintain SPO2 >90    CARDIAC:  #Aortic Stenosis  -ECHO reveals significant Aortic stenosis  -will need eventual JEAN once euvolemic and stabilized  -Left ventricular systolic function is mildly to moderately decreased with an ejection fraction visually estimated at 40 to 45%. There are regional wall motion abnormalities present. Hypokinesis of the inferior and inferolateral walls.  The aortic valve anatomy cannot be determined. There iscalcification of the aortic valve leaflets. The peak transaortic velocity is 3.51 m/s, peak transaortic gradient is 49.3 mmHg and mean transaortic gradient is 26.0 mmHg with an LVOT/aortic valve VTI ratio of 0.25. The gross appearance of the aortic valve is consistent with significant aortic stenosis. However, unable to accurately estimate the aortic valve area. There is mild aortic regurgitation.      #CAD:  Left heart Cath is 2021  -Findings LM normal, LAD 60%, CX -OM1 with 60%, %   -Discontinued aspirin, (for eliquis) and continue plavix    #Acute Decompensated Heart Failure Stage C NYHA class IV  -Volume Overloaded on Exam  -Heart Failure is following  -Continue bumex gtt at 1mg/hr  -Net negative 2265cc  -Titrate Nitro gtt  -continue hydralazine 25 tid  -strict intake and output  -daily weight  -will likely need an ischemic evaluation and TAVER evaluation (?JEAN/ LHC/RHC once optimized)  -will wait further HF recommendations    #Paroxysmal Atrial Fibrillation   -CHADSVASC is 7  -Patient does not take eliquis at home, while in the CCU, concern for AFIB on monitor, paroxysmal  -eliquis renally dosed 2.5 bid  -discontinued aspirin and placed on eliquis 2.5 bid  -holding BB for now in setting of heart failure    GI:  Abdominal distension in setting of SOB and belly breathing  No further  issues  NPO while on the BIPAP    :  #Acute Kidney Injury superimposed on CKD stage 3  -Renal artery stenosis s/p stent  -Creatinine is 2.51 today  -continue plavix for USHA stent  -renally dose medications  -avoid nephrotoxic agents  -continue diuresis    #Urinary retention:  -baldder scan revealed >600  -indwelling catheter placed  -strict intake and output    HEME/ONC:  #Myelodysplastic Syndrome  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) - Per outpatient chart anemia is also multifactorial -- AOCD/AORF  - Follow up outpatient with heme onc  - Hematology consulted for clarification of disease process  -also for clarification, patient takes levofloxacin for neutropenia    #History of Prostate Cancer:  -last PSA negative  -follow up with urology    #Recurrent UTI  -UA is negative    RHEUMATOLOGY:  #Gout  -continue allopurinol    VASCULAR:  #Peripheral Artery Disease:  -continue crestor  #Renal Artery Stenosis  -Follows with Dr. Gabriel Reddy  -continue plavix  -continue gabapentin 300tid  VTE PPX:  eliquis initiated    LINES:  LEFT FOREARM PICC LINE FOR CHEMO (came to hospital with the PICC Line and it gets serviced/dressing changed every Friday)  ****MEDICATION RECONCILLIATION CONFIRMED MEDS WITH HIS AIDE TODAY**   85 y/o male with PMHX of CAD (LHC 3/18/21 mild LAD 60%, OM1 60%, Prox  ), HFrEF (JEAN 12/26/24 LVEF 35%, trace TR, MR), afib, anemia, renal artery stenosis s/p L renal stent, HTN, prstate CA/ MDS currently on chemotherapy via PICC line, last dose was in mid April.  Pt comes in with complaints of SOB and weight gain. He was found to be hypoxic in ED with O2 sat 80s, was placed on BIPAP. HF consult called on 6/3/25 to help manage care in this patient who is suspected to be in ADHF. Labs show proBNP 6482, lactate 2.5 now improved to 1.5, BUN/Cr 41/2.55, H/H 8.3/25, trop 171/166. CXR shows b/l pleural effusions. Pt still on BIPAP, pt's aide helped answer questions. Removing BIPAP sat dropped to 71%. Pt lives alone with live in aide. He admits to eating very salty meals- frozen dinners frequently. He is compliant with taking all HF medications, given to him by his aide. Overall stage C HF, NYHA class IV-severely hypervolemic and hypoxic requiring BIPAP.    Neuro:  Alert and oriented x 3    RESPIRATORY:  #Acute Hypoxic Respiratory Failure on BIPAP  -ABG this am 7.46/37/75/26/96.8  -BIPAP setting changed after the blood gas to 12/6 FIO2 60%  -continue bipap today, patient desaturates quickly when BIPAP is removed  -titrate the BIPAP off as he diureses and try to transition to nasal cannula  -maintain SPO2 >90    CARDIAC:  #Aortic Stenosis  -ECHO reveals significant Aortic stenosis  -will need eventual JEAN once euvolemic and stabilized  -Left ventricular systolic function is mildly to moderately decreased with an ejection fraction visually estimated at 40 to 45%. There are regional wall motion abnormalities present. Hypokinesis of the inferior and inferolateral walls.  The aortic valve anatomy cannot be determined. There iscalcification of the aortic valve leaflets. The peak transaortic velocity is 3.51 m/s, peak transaortic gradient is 49.3 mmHg and mean transaortic gradient is 26.0 mmHg with an LVOT/aortic valve VTI ratio of 0.25. The gross appearance of the aortic valve is consistent with significant aortic stenosis. However, unable to accurately estimate the aortic valve area. There is mild aortic regurgitation.      #CAD:  Left heart Cath is 2021  -Findings LM normal, LAD 60%, CX -OM1 with 60%, %   -Discontinued aspirin, (for eliquis) and continue plavix    #Acute Decompensated Heart Failure Stage C NYHA class IV  -Volume Overloaded on Exam  -Heart Failure is following  -Continue bumex gtt at 1mg/hr  -Net negative 2265cc  -Titrate Nitro gtt  -continue hydralazine 25 tid  -strict intake and output  -daily weight  -will likely need an ischemic evaluation and TAVER evaluation (?JEAN/ LHC/RHC once optimized)  -will wait further HF recommendations    #Paroxysmal Atrial Fibrillation   -CHADSVASC is 7  -Patient does not take eliquis at home, while in the CCU, concern for AFIB on monitor, paroxysmal  -EP consulted, patient reports "all the doctors say I probably have afib"  -eliquis renally dosed 2.5 bid  -discontinued aspirin and placed on eliquis 2.5 bid  -holding BB for now in setting of heart failure    GI:  Abdominal distension in setting of SOB and belly breathing  No further  issues  NPO while on the BIPAP    :  #Acute Kidney Injury superimposed on CKD stage 3  -Renal artery stenosis s/p stent  -Creatinine is 2.51 today  -continue plavix for USHA stent  -renally dose medications  -avoid nephrotoxic agents  -continue diuresis    #Urinary retention:  -baldder scan revealed >600  -indwelling catheter placed  -strict intake and output    HEME/ONC:  #Myelodysplastic Syndrome  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) - Per outpatient chart anemia is also multifactorial -- AOCD/AORF  - Follow up outpatient with heme onc  - Hematology consulted for clarification of disease process  -also for clarification, patient takes levofloxacin for neutropenia    #History of Prostate Cancer:  -last PSA negative  -follow up with urology    #Recurrent UTI  -UA is negative  -in the past, chronic UTIs, was takiing amoxicillin, but no longer  RHEUMATOLOGY:  #Gout  -continue allopurinol    VASCULAR:  #Peripheral Artery Disease:  -continue crestor  #Renal Artery Stenosis  -Follows with Dr. Gabriel Reddy  -continue plavix  -continue gabapentin 300tid  VTE PPX:  eliquis initiated    LINES:  LEFT FOREARM PICC LINE FOR CHEMO (came to hospital with the PICC Line and it gets serviced/dressing changed every Friday)  ****MEDICATION RECONCILLIATION CONFIRMED MEDS WITH HIS AIDE TODAY**   85 y/o male with PMHX of CAD (LHC 3/18/21 mild LAD 60%, OM1 60%, Prox  ), HFrEF (JEAN 12/26/24 LVEF 35%, trace TR, MR), afib, anemia, renal artery stenosis s/p L renal stent, HTN, prstate CA/ MDS currently on chemotherapy via PICC line, last dose was in mid April.  Pt comes in with complaints of SOB and weight gain. He was found to be hypoxic in ED with O2 sat 80s, was placed on BIPAP. HF consult called on 6/3/25 to help manage care in this patient who is suspected to be in ADHF. Labs show proBNP 6482, lactate 2.5 now improved to 1.5, BUN/Cr 41/2.55, H/H 8.3/25, trop 171/166. CXR shows b/l pleural effusions. Pt still on BIPAP, pt's aide helped answer questions. Removing BIPAP sat dropped to 71%. Pt lives alone with live in aide. He admits to eating very salty meals- frozen dinners frequently. He is compliant with taking all HF medications, given to him by his aide. Overall stage C HF, NYHA class IV-severely hypervolemic and hypoxic requiring BIPAP.    Neuro:  Alert and oriented x 3    RESPIRATORY:  #Acute Hypoxic Respiratory Failure on BIPAP  -ABG this am 7.46/37/75/26/96.8  -BIPAP setting changed after the blood gas to 12/6 FIO2 60%  -continue bipap today, patient desaturates quickly when BIPAP is removed  -titrate the BIPAP off as he diureses and try to transition to nasal cannula  -maintain SPO2 >90    CARDIAC:  #Aortic Stenosis  -ECHO reveals significant Aortic stenosis  -will need eventual JEAN once euvolemic and stabilized  -Left ventricular systolic function is mildly to moderately decreased with an ejection fraction visually estimated at 40 to 45%. There are regional wall motion abnormalities present. Hypokinesis of the inferior and inferolateral walls.  The aortic valve anatomy cannot be determined. There iscalcification of the aortic valve leaflets. The peak transaortic velocity is 3.51 m/s, peak transaortic gradient is 49.3 mmHg and mean transaortic gradient is 26.0 mmHg with an LVOT/aortic valve VTI ratio of 0.25. The gross appearance of the aortic valve is consistent with significant aortic stenosis. However, unable to accurately estimate the aortic valve area. There is mild aortic regurgitation.      #CAD:  Left heart Cath is 2021  -Findings LM normal, LAD 60%, CX -OM1 with 60%, %   -Discontinued aspirin, (for eliquis) and continue plavix    #Acute Decompensated Heart Failure Stage C NYHA class IV  -Volume Overloaded on Exam  -Heart Failure is following  -Continue bumex gtt at 1mg/hr  -Net negative 2265cc  -Titrate Nitro gtt  -continue hydralazine 25 tid  -strict intake and output  -daily weight  -will likely need an ischemic evaluation and TAVER evaluation (?JEAN/ LHC/RHC once optimized)  -will wait further HF recommendations    #NSVT on monitor and frequent PVCs  -maintain K>4, mag>2  -will discuss further managment with EP    #Paroxysmal Atrial Fibrillation   -CHADSVASC is 7  -Patient does not take eliquis at home, while in the CCU, concern for AFIB on monitor, paroxysmal  -EP consulted, patient reports "all the doctors say I probably have afib"  -eliquis renally dosed 2.5 bid  -discontinued aspirin and placed on eliquis 2.5 bid  -holding BB for now in setting of heart failure    GI:  Abdominal distension in setting of SOB and belly breathing  No further  issues  NPO while on the BIPAP    :  #Acute Kidney Injury superimposed on CKD stage 3  -Renal artery stenosis s/p stent  -Creatinine is 2.51 today  -continue plavix for USHA stent  -renally dose medications  -avoid nephrotoxic agents  -continue diuresis    #Urinary retention:  -baldder scan revealed >600  -indwelling catheter placed  -strict intake and output    HEME/ONC:  #Myelodysplastic Syndrome  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) - Per outpatient chart anemia is also multifactorial -- AOCD/AORF  - Follow up outpatient with heme onc  - Hematology consulted for clarification of disease process  -also for clarification, patient takes levofloxacin for neutropenia    At 4pm: HGB7.1 HCT 20  1 unit of packed cells given      #History of Prostate Cancer:  -last PSA negative  -follow up with urology    #Recurrent UTI  -UA is negative  -in the past, chronic UTIs, was takiing amoxicillin, but no longer  RHEUMATOLOGY:  #Gout  -continue allopurinol    VASCULAR:  #Peripheral Artery Disease:  -continue crestor  #Renal Artery Stenosis  -Follows with Dr. Gabriel Reddy  -continue plavix  -continue gabapentin 300tid  VTE PPX:  eliquis initiated    LINES:  LEFT FOREARM PICC LINE FOR CHEMO (came to hospital with the PICC Line and it gets serviced/dressing changed every Friday)  ****MEDICATION RECONCILLIATION CONFIRMED MEDS WITH HIS AIDE TODAY**

## 2025-06-04 NOTE — CONSULT NOTE ADULT - NS ATTEND AMEND GEN_ALL_CORE FT
PAT, cont eliquis and rate control, will fu
Hx summarized above.  Chart reviewed, pt examined, case d/w CCU.  He has acute hypoxemic resp failure requiring PPV, with O2 desat to 70% off O2.  CXR shows significant pleural effusions.  Tx to CCU.  Start Bumex 1 mg/hr immediately.  In CCU should be put on NTG gtt at 10 mcg/min with rapid uptitration, as tolerated.  Repeat echo to assess aortic stenosis.

## 2025-06-04 NOTE — PROGRESS NOTE ADULT - CRITICAL CARE ATTENDING COMMENT
84 year old man with MDS, HTN HLD, USHA sp stent, AF who presented with progressive dyspnea and fluid overload. Placed on Bipap and started on both bumex and nitro gtt     TTE 6/4/25:   1. The left ventricular cavity is normal in size. Left ventricular wall thickness is normal. Left ventricular systolic function is mildly to moderately decreased with an ejection fraction visually estimated at 40 to 45%. There are regional wall motion abnormalities present. Hypokinesis of the inferior and inferolateral walls.   2. Normal right ventricular cavity size and probably normal right ventricular systolic function. Tricuspid annular plane systolic excursion (TAPSE) is 2.0 cm (normal >=1.7 cm).   3. Structurally normal mitral valve with normal leaflet excursion. There is calcification of the mitral valve annulus. There is mild leaflet calcification. There is mild to moderate mitral regurgitation.   4. The aortic valve anatomy cannot be determined. There is calcification of the aortic valve leaflets. The peak transaortic velocity is 3.51 m/s, peak transaortic gradient is 49.3 mmHg and mean transaortic gradient is 26.0 mmHg with an LVOT/aortic valve VTI ratio of 0.25. The gross appearance of the aortic valve is consistent with significant aortic stenosis. However, unable to accurately estimate the aortic valve area. There is mild aortic regurgitation.   5. The left atrium is moderately dilated with an indexed volume of 45.18 ml/m².   6. The tricuspid valve is structurally normal with normal leaflet excursion. There is mild tricuspid regurgitation. Estimated pulmonary artery systolic pressure is 42 mmHg, consistent with mild pulmonary hypertension.   7. Left pleural effusion noted.   8. No prior echocardiogram is available for comparison.    Meds:  Nitro gtt  Bumex gtt 1 mg/hr  ASA  Plavix  Hydral 25 mg TID  Crestor 10 mg daily  SQ heparin    #Neuro- No active issues  #Pulm- On BiPAP-wean as tolerates  #CV- HF with mild to moderately reduced EF (40-45%), mild to moderate MR, significant AS, AF  Continue Bumex gtt at 1 mg/hr  Nitro gtt-wean   Continue plavix/statin  Eventual workup for AS once stable  HF input appreciated  start eliquis  #Renal- CKD stage 3  Continue to monitor

## 2025-06-04 NOTE — PROGRESS NOTE ADULT - SUBJECTIVE AND OBJECTIVE BOX
NEPHROLOGY     Patient seen and examined on bipap    MEDICATIONS  (STANDING):  allopurinol 200 milliGRAM(s) Oral daily  apixaban      apixaban 2.5 milliGRAM(s) Oral once  apixaban 2.5 milliGRAM(s) Oral two times a day  bumetanide Infusion 1 mG/Hr (5 mL/Hr) IV Continuous <Continuous>  chlorhexidine 2% Cloths 1 Application(s) Topical <User Schedule>  clopidogrel Tablet 75 milliGRAM(s) Oral daily  gabapentin 300 milliGRAM(s) Oral two times a day  hydrALAZINE 25 milliGRAM(s) Oral every 8 hours  nitroglycerin  Infusion 10 MICROgram(s)/Min (3 mL/Hr) IV Continuous <Continuous>  pantoprazole    Tablet 40 milliGRAM(s) Oral before breakfast  rosuvastatin 10 milliGRAM(s) Oral at bedtime    VITALS:  T(C): , Max: 37.4 (06-03-25 @ 22:50)  T(F): , Max: 99.3 (06-03-25 @ 22:50)  HR: 88 (06-04-25 @ 11:00)  BP: 126/72 (06-04-25 @ 11:00)  BP(mean): 89 (06-04-25 @ 11:00)  RR: 22 (06-04-25 @ 11:00)  SpO2: 97% (06-04-25 @ 11:00)    I and O's:    06-03 @ 07:01  -  06-04 @ 07:00  --------------------------------------------------------  IN: 160 mL / OUT: 2425 mL / NET: -2265 mL      PHYSICAL EXAM:  Constitutional: NAD  HEENT: EOMI  Neck:  No JVD, supple   Respiratory: course   Cardiovascular: S1 and S2, RRR  Gastrointestinal: + BS, soft, NT, ND  Extremities: No peripheral edema, + peripheral pulses  Neurological: A/O x 3, CN2-12 intact  Psychiatric: Normal mood, normal affect  : + Min  Skin: No rashes, C/D/I    LABS:                        7.4    2.73  )-----------( 56       ( 04 Jun 2025 04:37 )             22.0     06-04    142  |  105  |  41[H]  ----------------------------<  129[H]  3.7   |  23  |  2.51[H]    Ca    8.0[L]      04 Jun 2025 04:37  Phos  4.4     06-04  Mg     2.40     06-04    TPro  6.2  /  Alb  3.6  /  TBili  0.6  /  DBili  x   /  AST  13  /  ALT  7   /  AlkPhos  91  06-04      RADIOLOGY & ADDITIONAL STUDIES:  rad    85 Y/O M PMH MDS, HTN, CKD 3, A Fib, Anemia, HTN, Prostate CA, gout, obesity, presents with 3 days of orthopnea, increasing shortness of breath and weight gain  CHF   CKD stage 4   Bl effusion    NEPHROLOGY     Patient seen and examined on bipap, on bumex and nitro gtts, reports feeling ok, breathing about the same, denies pain, in no acute distress.     MEDICATIONS  (STANDING):  allopurinol 200 milliGRAM(s) Oral daily  apixaban      apixaban 2.5 milliGRAM(s) Oral once  apixaban 2.5 milliGRAM(s) Oral two times a day  bumetanide Infusion 1 mG/Hr (5 mL/Hr) IV Continuous <Continuous>  chlorhexidine 2% Cloths 1 Application(s) Topical <User Schedule>  clopidogrel Tablet 75 milliGRAM(s) Oral daily  gabapentin 300 milliGRAM(s) Oral two times a day  hydrALAZINE 25 milliGRAM(s) Oral every 8 hours  nitroglycerin  Infusion 10 MICROgram(s)/Min (3 mL/Hr) IV Continuous <Continuous>  pantoprazole    Tablet 40 milliGRAM(s) Oral before breakfast  rosuvastatin 10 milliGRAM(s) Oral at bedtime    VITALS:  T(C): , Max: 37.4 (06-03-25 @ 22:50)  T(F): , Max: 99.3 (06-03-25 @ 22:50)  HR: 88 (06-04-25 @ 11:00)  BP: 126/72 (06-04-25 @ 11:00)  BP(mean): 89 (06-04-25 @ 11:00)  RR: 22 (06-04-25 @ 11:00)  SpO2: 97% (06-04-25 @ 11:00)    I and O's:    06-03 @ 07:01  -  06-04 @ 07:00  --------------------------------------------------------  IN: 160 mL / OUT: 2425 mL / NET: -2265 mL      PHYSICAL EXAM:  Constitutional: NAD  HEENT: EOMI  Neck:  No JVD, supple   Respiratory: course   Cardiovascular: S1 and S2, RRR  Gastrointestinal: + BS, soft, NT, ND  Extremities: + peripheral edema, + peripheral pulses  Neurological: A/O x 3, CN2-12 intact  Psychiatric: Normal mood, normal affect  : + Min  Skin: No rashes, C/D/I    LABS:                        7.4    2.73  )-----------( 56       ( 04 Jun 2025 04:37 )             22.0     06-04    142  |  105  |  41[H]  ----------------------------<  129[H]  3.7   |  23  |  2.51[H]    Ca    8.0[L]      04 Jun 2025 04:37  Phos  4.4     06-04  Mg     2.40     06-04    TPro  6.2  /  Alb  3.6  /  TBili  0.6  /  DBili  x   /  AST  13  /  ALT  7   /  AlkPhos  91  06-04      ________________________________________________________________________________________  Referring Physician:    [id number] Grupo Church  Interpreting Physician: Jhonatan Escobar M.D.  Primary Sonographer:    Brittney Harvey [facility]    CPT:               ECHO TTE WO CON COMP W DOPP - 66865.m  Indication(s):     Cardiomyopathy, unspecified - I42.9  Procedure:         Transthoracic echocardiogram with 2-D, M-mode and complete                     spectral and color flow Doppler.  Ordering Location: [facility]  Admission Status:  Inpatient  Study Information: Image quality for this study is adequate.    _______________________________________________________________________________________     CONCLUSIONS:      1. The left ventricular cavity is normal in size. Left ventricular wall thickness is normal. Left ventricular systolic function is mildly to moderately decreased with an ejection fraction visually estimated at 40 to 45%. There are regional wall motion abnormalities present. Hypokinesis of the inferior and inferolateral walls.   2. Normal right ventricular cavity size and probably normal right ventricular systolic function. Tricuspid annular plane systolic excursion (TAPSE) is 2.0 cm (normal >=1.7 cm).   3. Structurally normal mitral valve with normal leaflet excursion. There is calcification of the mitral valve annulus. There is mild leaflet calcification. There is mild to moderate mitral regurgitation.   4. The aortic valve anatomy cannot be determined. There is calcification of the aortic valve leaflets. The peak transaortic velocity is 3.51 m/s, peak transaortic gradient is 49.3 mmHg and mean transaortic gradient is 26.0 mmHg with an LVOT/aortic valve VTI ratio of 0.25. The gross appearance of the aortic valve is consistent with significant aortic stenosis. However, unable to accurately estimate the aortic valve area. There is mild aortic regurgitation.   5. The left atrium is moderately dilated with an indexed volume of 45.18 ml/m².   6. The tricuspid valve is structurally normal with normal leaflet excursion. There is mild tricuspid regurgitation. Estimated pulmonary artery systolic pressure is 42 mmHg, consistent with mild pulmonary hypertension.   7. Left pleural effusion noted.   8. No prior echocardiogram is available for comparison.    ____________________________________________________________________  Recommendations:  Consider JEAN for further evaluation of the aortic valve, if clinically indicated.     ________________________________________________________________________________________      85 Y/O M PMH MDS, HTN, CKD 3, A Fib, Anemia, HTN, Prostate CA, gout, obesity, presents with 3 days of orthopnea, increasing shortness of breath and weight gain  CHF   CKD stage 4   Bl effusion     1 Renal- renal fxn stable, now on Bumex gtt at 1mg/hr   Trend serum creatinine and strict ins and outs   A/w KCl 20 mEq IV x 1   2 CVS- BP is stable, on nitro gtt and not on BBlockers at present  Repeat echo noted  HF following   3 Pulm- On bipap and continue aggressive fluid removal      NEPHROLOGY     Patient seen and examined on bipap, on bumex and nitro gtts, reports feeling ok, breathing about the same, denies pain, in no acute distress.     MEDICATIONS  (STANDING):  allopurinol 200 milliGRAM(s) Oral daily.  apixaban      apixaban 2.5 milliGRAM(s) Oral once  apixaban 2.5 milliGRAM(s) Oral two times a day  bumetanide Infusion 1 mG/Hr (5 mL/Hr) IV Continuous <Continuous>  chlorhexidine 2% Cloths 1 Application(s) Topical <User Schedule>  clopidogrel Tablet 75 milliGRAM(s) Oral daily  gabapentin 300 milliGRAM(s) Oral two times a day  hydrALAZINE 25 milliGRAM(s) Oral every 8 hours  nitroglycerin  Infusion 10 MICROgram(s)/Min (3 mL/Hr) IV Continuous <Continuous>  pantoprazole    Tablet 40 milliGRAM(s) Oral before breakfast  rosuvastatin 10 milliGRAM(s) Oral at bedtime    VITALS:  T(C): , Max: 37.4 (06-03-25 @ 22:50)  T(F): , Max: 99.3 (06-03-25 @ 22:50)  HR: 88 (06-04-25 @ 11:00)  BP: 126/72 (06-04-25 @ 11:00)  BP(mean): 89 (06-04-25 @ 11:00)  RR: 22 (06-04-25 @ 11:00)  SpO2: 97% (06-04-25 @ 11:00)    I and O's:    06-03 @ 07:01  -  06-04 @ 07:00  --------------------------------------------------------  IN: 160 mL / OUT: 2425 mL / NET: -2265 mL      PHYSICAL EXAM:  Constitutional: NAD  HEENT: EOMI  Neck:  No JVD, supple   Respiratory: course   Cardiovascular: S1 and S2, RRR  Gastrointestinal: + BS, soft, NT, ND  Extremities: + peripheral edema, + peripheral pulses  Neurological: A/O x 3, CN2-12 intact  Psychiatric: Normal mood, normal affect  : + Min  Skin: No rashes, C/D/I.    LABS:                        7.4    2.73  )-----------( 56       ( 04 Jun 2025 04:37 )             22.0     06-04    142  |  105  |  41[H]  ----------------------------<  129[H]  3.7   |  23  |  2.51[H].    Ca    8.0[L]      04 Jun 2025 04:37  Phos  4.4     06-04  Mg     2.40     06-04    TPro  6.2  /  Alb  3.6  /  TBili  0.6  /  DBili  x   /  AST  13  /  ALT  7   /  AlkPhos  91  06-04      ________________________________________________________________________________________  Referring Physician:    [id number] Grupo Church  Interpreting Physician: Jhonatan Escobar M.D.  Primary Sonographer:    Brittney Harvey [facility]    CPT:               ECHO TTE WO CON COMP W DOPP - 20547.m  Indication(s):     Cardiomyopathy, unspecified - I42.9  Procedure:         Transthoracic echocardiogram with 2-D, M-mode and complete                     spectral and color flow Doppler.  Ordering Location: [facility]  Admission Status:  Inpatient  Study Information: Image quality for this study is adequate.    _______________________________________________________________________________________     CONCLUSIONS:      1. The left ventricular cavity is normal in size. Left ventricular wall thickness is normal. Left ventricular systolic function is mildly to moderately decreased with an ejection fraction visually estimated at 40 to 45%. There are regional wall motion abnormalities present. Hypokinesis of the inferior and inferolateral walls.   2. Normal right ventricular cavity size and probably normal right ventricular systolic function. Tricuspid annular plane systolic excursion (TAPSE) is 2.0 cm (normal >=1.7 cm).   3. Structurally normal mitral valve with normal leaflet excursion. There is calcification of the mitral valve annulus. There is mild leaflet calcification. There is mild to moderate mitral regurgitation.   4. The aortic valve anatomy cannot be determined. There is calcification of the aortic valve leaflets. The peak transaortic velocity is 3.51 m/s, peak transaortic gradient is 49.3 mmHg and mean transaortic gradient is 26.0 mmHg with an LVOT/aortic valve VTI ratio of 0.25. The gross appearance of the aortic valve is consistent with significant aortic stenosis. However, unable to accurately estimate the aortic valve area. There is mild aortic regurgitation.   5. The left atrium is moderately dilated with an indexed volume of 45.18 ml/m².   6. The tricuspid valve is structurally normal with normal leaflet excursion. There is mild tricuspid regurgitation. Estimated pulmonary artery systolic pressure is 42 mmHg, consistent with mild pulmonary hypertension.   7. Left pleural effusion noted.   8. No prior echocardiogram is available for comparison.    ____________________________________________________________________  Recommendations:  Consider JEAN for further evaluation of the aortic valve, if clinically indicated.     ________________________________________________________________________________________

## 2025-06-04 NOTE — PROGRESS NOTE ADULT - CRITICAL CARE ATTENDING COMMENT
A/w acute hypoxemic resp failure requiring PPV, with O2 desat to 70% off O2.  Now in CCU on Bumex and NTG drips.  Good diuresis. Pleural effusions have decreased. O2 requirement less.  Echo shows likely severe aortic stenosis. Will need structural heart eval once clinically stabilized.

## 2025-06-04 NOTE — PROGRESS NOTE ADULT - SUBJECTIVE AND OBJECTIVE BOX
Interval History:  Patient resting comfortably in bed   Denies CP/SOB/palpitations/dizziness.  No acute events overnight.      Medications:  allopurinol 200 milliGRAM(s) Oral daily  apixaban      bumetanide Infusion 1 mG/Hr IV Continuous <Continuous>  chlorhexidine 2% Cloths 1 Application(s) Topical <User Schedule>  clopidogrel Tablet 75 milliGRAM(s) Oral daily  gabapentin 300 milliGRAM(s) Oral two times a day  heparin   Injectable 5000 Unit(s) SubCutaneous every 8 hours  hydrALAZINE 25 milliGRAM(s) Oral every 8 hours  melatonin 3 milliGRAM(s) Oral at bedtime PRN  nitroglycerin  Infusion 10 MICROgram(s)/Min IV Continuous <Continuous>  pantoprazole    Tablet 40 milliGRAM(s) Oral before breakfast  rosuvastatin 10 milliGRAM(s) Oral at bedtime      Vitals:  T(C): 36.5 (25 @ 04:00), Max: 37.4 (25 @ 22:50)  HR: 86 (25 @ 09:00) (80 - 892)  BP: 130/68 (25 @ 09:00) (107/77 - 161/96)  BP(mean): 86 (25 @ 09:00) (79 - 110)  RR: 15 (25 @ 09:00) (14 - 23)  SpO2: 97% (25 @ 09:00) (93% - 100%)    Daily     Daily Weight in k.4 (2025 05:00)    Weight (kg): 97.5 ( @ 19:14)    I&O's Summary    2025 07:  -  2025 07:00  --------------------------------------------------------  IN: 160 mL / OUT: 2425 mL / NET: -2265 mL    2025 07:  -  2025 11:08  --------------------------------------------------------  IN: 60 mL / OUT: 550 mL / NET: -490 mL        Physical Exam:  Appearance: No Acute Distress  Neck: JVP  Cardiovascular: Normal S1 S2  Respiratory: Clear to auscultation bilaterally diminished LLL  Gastrointestinal: Soft, Non-tender	  Skin: No cyanosis	  Neurologic: Non-focal  Extremities: BLE edema      Labs:                        7.4    2.73  )-----------( 56       ( 2025 04:37 )             22.0     06-04    142  |  105  |  41[H]  ----------------------------<  129[H]  3.7   |  23  |  2.51[H]    Ca    8.0[L]      2025 04:37  Phos  4.4     06-04  Mg     2.40     06-04    TPro  6.2  /  Alb  3.6  /  TBili  0.6  /  DBili  x   /  AST  13  /  ALT  7   /  AlkPhos  91  06-04    PT/INR - ( 2025 04:37 )   PT: 14.2 sec;   INR: 1.20 ratio         PTT - ( 2025 04:37 )  PTT:28.1 sec  CARDIAC MARKERS ( 2025 20:07 )  x     / x     / x     / x     / 7.3 ng/mL        TELEMETRY:    Echocardiogram:  < from: TTE W or WO Ultrasound Enhancing Agent (25 @ 06:31) >  _______________________________________________________________________________________     CONCLUSIONS:      1. The left ventricular cavity is normal in size. Left ventricular wall thickness is normal. Left ventricular systolic function is mildly to moderately decreased with an ejection fraction visually estimated at 40 to 45%. There are regional wall motion abnormalities present. Hypokinesis of the inferior and inferolateral walls.   2. Normal right ventricular cavity size and probably normal right ventricular systolic function. Tricuspid annular plane systolic excursion (TAPSE) is 2.0 cm (normal >=1.7 cm).   3. Structurally normal mitral valve with normal leaflet excursion. There is calcification of the mitral valve annulus. There is mild leaflet calcification. There is mild to moderatemitral regurgitation.   4. The aortic valve anatomy cannot be determined. There is calcification of the aortic valve leaflets. The peak transaortic velocity is 3.51 m/s, peak transaortic gradient is 49.3 mmHg and mean transaortic gradient is 26.0 mmHg with an LVOT/aortic valve VTI ratio of 0.25. The gross appearance of the aortic valve is consistent with significant aortic stenosis. However, unable to accurately estimate the aortic valve area. There is mild aortic regurgitation.   5. The left atrium is moderately dilated with an indexed volume of 45.18 ml/m².   6. The tricuspid valve is structurally normal with normal leaflet excursion. There is mild tricuspid regurgitation. Estimated pulmonary artery systolic pressure is 42 mmHg, consistent with mild pulmonary hypertension.   7. Left pleural effusion noted.   8. No prior echocardiogram is available for comparison.    ____________________________________________________________________  Recommendations:  Consider JEAN for further evaluation of the aortic valve, if clinically indicated.     ________________________________________________________________________________________  FINDINGS:     Left Ventricle:  The left ventricular cavity is normal in size. Left ventricular wall thicknessis normal. Left ventricular systolic function is mildly to moderately decreased with an ejection fraction visually estimated at 40 to 45%. There are regional wall motion abnormalities present. Hypokinesis of the inferior and inferolateral walls.     Right Ventricle:  The right ventricular cavity is normal in size and right ventricular systolic function is probably normal. Tricuspid annular plane systolic excursion (TAPSE) is 2.0 cm (normal >=1.7 cm).     Left Atrium:  The left atrium is moderately dilated with an indexed volume of 45.18 ml/m².     Right Atrium:  The right atrium is normal in size with an indexed volume of 19.92 ml/m² and an indexed area of 8.16 cm²/m².     Aortic Valve:  The aortic valve anatomy cannot be determined. There iscalcification of the aortic valve leaflets. The peak transaortic velocity is 3.51 m/s, peak transaortic gradient is 49.3 mmHg and mean transaortic gradient is 26.0 mmHg with an LVOT/aortic valve VTI ratio of 0.25. The gross appearance of the aortic valve is consistent with significant aortic stenosis. However, unable to accurately estimate the aortic valve area. There is mild aortic regurgitation.     Mitral Valve:  Structurally normal mitral valve with normal leaflet excursion. There is calcification of the mitral valve annulus. There is mild leaflet calcification. There is mild to moderate mitral regurgitation.     Tricuspid Valve:  The tricuspid valve is structurally normal with normal leaflet excursion. There is mild tricuspid regurgitation. Estimated pulmonary artery systolic pressure is 42 mmHg, consistent with mild pulmonary hypertension.     Pulmonic Valve:  Structurally normal pulmonic valve with normal leaflet excursion. There is trace pulmonic regurgitation.     Aorta:  The aortic arch is not well visualized. The aortic root at the sinuses of Valsalva is normal in size, measuring 3.00 cm (indexed 1.46 cm/m²). The ascending aorta is normal in size.     Pericardium:  No pericardial effusion seen.     Pleura:  Left pleural effusion noted.     Systemic Veins:  The inferior vena cava is normal in size measuring 2.10 cm in diameter, (normal <2.1cm) with normal inspiratory collapse (normal >50%) consistent with normal right atrial pressure (~3, range 0-5mmHg).  ____________________________________________________________________  QUANTITATIVE DATA:  Left Ventricle Measurements: (Indexed to BSA)     IVSd (2D):   0.9 cm  LVPWd (2D):  0.9 cm  LVIDd (2D):  5.5 cm  LVIDs (2D):  4.7 cm  LV Mass:     183 g  88.9 g/m²  Visualized LV EF%: 40 to 45%     MV E Vmax:    1.37 m/s  MV A Vmax:    1.06 m/s  MV E/A:       1.29  e' lateral:   7.51 cm/s  e' medial:    7.29 cm/s  E/e' lateral: 18.24  E/e' medial:  18.79  E/e' Average: 18.51  MV DT:        118 msec    Aorta Measurements: (Normal range) (Indexed to BSA)     Ao Root d     3.00 cm (3.1 - 3.7 cm) 1.46 cm/m²  Ao Asc d, 2D: 2.50  Ao Asc prox:  2.50 cm                1.21 cm/m²            Left Atrium Measurements: (Indexed to BSA)  LA Diam 2D: 4.40 cm         Right Ventricle Measurements: Right Atrial Measurements:     TAPSE:            2.0 cm      RA Vol s, MOD A4C         41.0 ml  RV S' Vmax:       11.90 cm/s  RA Vol s, MOD A4C i BSA   19.92 ml/m²  RV Base (RVID1):  3.0 cm      RA Area s, MOD A4C        16.8 cm²  RV Mid (RVID2):   2.6 cm      RA Area s, MOD A4C, i BSA 8.16 cm²/m²  RV Major (RVID3): 5.1 cm       LVOT / RVOT/ Qp/Qs Data: (Indexed to BSA)  LVOT Vmax:      0.87 m/s  LVOT Vmn:       0.590 m/s  LVOT VTI:       18.90 cm  LVOT peak grad: 3 mmHg  LVOT mean grad: 1.5 mmHg    Aortic Valve Measurements:  AV Vmax:                3.5 m/s  AV Peak Gradient:       49.3 mmHg  AV Mean Gradient:       26.0 mmHg  AV VTI:                 75.3 cm  AV VTI Ratio:           0.25  AoV Dimensionless Index 0.25    Mitral Valve Measurements:     MV E Vmax: 1.4 m/s  MV A Vmax: 1.1 m/s  MV E/A:    1.3       Tricuspid Valve Measurements:     TR Vmax:          3.1 m/s  TR Peak Gradient: 38.7 mmHg  RA Pressure:      3 mmHg  PASP:             42 mmHg     Interval History:  Patient resting comfortably in bed   Denies CP/SOB/palpitations/dizziness.  No acute events overnight.      Medications:  allopurinol 200 milliGRAM(s) Oral daily  apixaban      bumetanide Infusion 1 mG/Hr IV Continuous <Continuous>  chlorhexidine 2% Cloths 1 Application(s) Topical <User Schedule>  clopidogrel Tablet 75 milliGRAM(s) Oral daily  gabapentin 300 milliGRAM(s) Oral two times a day  heparin   Injectable 5000 Unit(s) SubCutaneous every 8 hours  hydrALAZINE 25 milliGRAM(s) Oral every 8 hours  melatonin 3 milliGRAM(s) Oral at bedtime PRN  nitroglycerin  Infusion 10 MICROgram(s)/Min IV Continuous <Continuous>  pantoprazole    Tablet 40 milliGRAM(s) Oral before breakfast  rosuvastatin 10 milliGRAM(s) Oral at bedtime      Vitals:  T(C): 36.5 (25 @ 04:00), Max: 37.4 (25 @ 22:50)  HR: 86 (25 @ 09:00) (80 - 892)  BP: 130/68 (25 @ 09:00) (107/77 - 161/96)  BP(mean): 86 (25 @ 09:00) (79 - 110)  RR: 15 (25 @ 09:00) (14 - 23)  SpO2: 97% (25 @ 09:00) (93% - 100%)    Daily     Daily Weight in k.4 (2025 05:00)    Weight (kg): 97.5 ( @ 19:14)    I&O's Summary    2025 07:  -  2025 07:00  --------------------------------------------------------  IN: 160 mL / OUT: 2425 mL / NET: -2265 mL    2025 07:  -  2025 11:08  --------------------------------------------------------  IN: 60 mL / OUT: 550 mL / NET: -490 mL        Physical Exam:  Appearance: No Acute Distress  Neck: JVP 8-10  Cardiovascular: Normal S1 S2  Respiratory: Clear to auscultation bilaterally diminished LLL  Gastrointestinal: Soft, Non-tender	  Skin: No cyanosis	  Neurologic: Non-focal  Extremities: No BLE edema      Labs:                        7.4    2.73  )-----------( 56       ( 2025 04:37 )             22.0     06-04    142  |  105  |  41[H]  ----------------------------<  129[H]  3.7   |  23  |  2.51[H]    Ca    8.0[L]      2025 04:37  Phos  4.4     06-04  Mg     2.40     06-04    TPro  6.2  /  Alb  3.6  /  TBili  0.6  /  DBili  x   /  AST  13  /  ALT  7   /  AlkPhos  91  06-04    PT/INR - ( 2025 04:37 )   PT: 14.2 sec;   INR: 1.20 ratio         PTT - ( 2025 04:37 )  PTT:28.1 sec  CARDIAC MARKERS ( 2025 20:07 )  x     / x     / x     / x     / 7.3 ng/mL        TELEMETRY: Sinus Rhythm     Echocardiogram:  < from: TTE W or WO Ultrasound Enhancing Agent (25 @ 06:31) >  _______________________________________________________________________________________     CONCLUSIONS:      1. The left ventricular cavity is normal in size. Left ventricular wall thickness is normal. Left ventricular systolic function is mildly to moderately decreased with an ejection fraction visually estimated at 40 to 45%. There are regional wall motion abnormalities present. Hypokinesis of the inferior and inferolateral walls.   2. Normal right ventricular cavity size and probably normal right ventricular systolic function. Tricuspid annular plane systolic excursion (TAPSE) is 2.0 cm (normal >=1.7 cm).   3. Structurally normal mitral valve with normal leaflet excursion. There is calcification of the mitral valve annulus. There is mild leaflet calcification. There is mild to moderatemitral regurgitation.   4. The aortic valve anatomy cannot be determined. There is calcification of the aortic valve leaflets. The peak transaortic velocity is 3.51 m/s, peak transaortic gradient is 49.3 mmHg and mean transaortic gradient is 26.0 mmHg with an LVOT/aortic valve VTI ratio of 0.25. The gross appearance of the aortic valve is consistent with significant aortic stenosis. However, unable to accurately estimate the aortic valve area. There is mild aortic regurgitation.   5. The left atrium is moderately dilated with an indexed volume of 45.18 ml/m².   6. The tricuspid valve is structurally normal with normal leaflet excursion. There is mild tricuspid regurgitation. Estimated pulmonary artery systolic pressure is 42 mmHg, consistent with mild pulmonary hypertension.   7. Left pleural effusion noted.   8. No prior echocardiogram is available for comparison.    ____________________________________________________________________  Recommendations:  Consider JEAN for further evaluation of the aortic valve, if clinically indicated.     ________________________________________________________________________________________  FINDINGS:     Left Ventricle:  The left ventricular cavity is normal in size. Left ventricular wall thicknessis normal. Left ventricular systolic function is mildly to moderately decreased with an ejection fraction visually estimated at 40 to 45%. There are regional wall motion abnormalities present. Hypokinesis of the inferior and inferolateral walls.     Right Ventricle:  The right ventricular cavity is normal in size and right ventricular systolic function is probably normal. Tricuspid annular plane systolic excursion (TAPSE) is 2.0 cm (normal >=1.7 cm).     Left Atrium:  The left atrium is moderately dilated with an indexed volume of 45.18 ml/m².     Right Atrium:  The right atrium is normal in size with an indexed volume of 19.92 ml/m² and an indexed area of 8.16 cm²/m².     Aortic Valve:  The aortic valve anatomy cannot be determined. There iscalcification of the aortic valve leaflets. The peak transaortic velocity is 3.51 m/s, peak transaortic gradient is 49.3 mmHg and mean transaortic gradient is 26.0 mmHg with an LVOT/aortic valve VTI ratio of 0.25. The gross appearance of the aortic valve is consistent with significant aortic stenosis. However, unable to accurately estimate the aortic valve area. There is mild aortic regurgitation.     Mitral Valve:  Structurally normal mitral valve with normal leaflet excursion. There is calcification of the mitral valve annulus. There is mild leaflet calcification. There is mild to moderate mitral regurgitation.     Tricuspid Valve:  The tricuspid valve is structurally normal with normal leaflet excursion. There is mild tricuspid regurgitation. Estimated pulmonary artery systolic pressure is 42 mmHg, consistent with mild pulmonary hypertension.     Pulmonic Valve:  Structurally normal pulmonic valve with normal leaflet excursion. There is trace pulmonic regurgitation.     Aorta:  The aortic arch is not well visualized. The aortic root at the sinuses of Valsalva is normal in size, measuring 3.00 cm (indexed 1.46 cm/m²). The ascending aorta is normal in size.     Pericardium:  No pericardial effusion seen.     Pleura:  Left pleural effusion noted.     Systemic Veins:  The inferior vena cava is normal in size measuring 2.10 cm in diameter, (normal <2.1cm) with normal inspiratory collapse (normal >50%) consistent with normal right atrial pressure (~3, range 0-5mmHg).  ____________________________________________________________________  QUANTITATIVE DATA:  Left Ventricle Measurements: (Indexed to BSA)     IVSd (2D):   0.9 cm  LVPWd (2D):  0.9 cm  LVIDd (2D):  5.5 cm  LVIDs (2D):  4.7 cm  LV Mass:     183 g  88.9 g/m²  Visualized LV EF%: 40 to 45%     MV E Vmax:    1.37 m/s  MV A Vmax:    1.06 m/s  MV E/A:       1.29  e' lateral:   7.51 cm/s  e' medial:    7.29 cm/s  E/e' lateral: 18.24  E/e' medial:  18.79  E/e' Average: 18.51  MV DT:        118 msec    Aorta Measurements: (Normal range) (Indexed to BSA)     Ao Root d     3.00 cm (3.1 - 3.7 cm) 1.46 cm/m²  Ao Asc d, 2D: 2.50  Ao Asc prox:  2.50 cm                1.21 cm/m²            Left Atrium Measurements: (Indexed to BSA)  LA Diam 2D: 4.40 cm         Right Ventricle Measurements: Right Atrial Measurements:     TAPSE:            2.0 cm      RA Vol s, MOD A4C         41.0 ml  RV S' Vmax:       11.90 cm/s  RA Vol s, MOD A4C i BSA   19.92 ml/m²  RV Base (RVID1):  3.0 cm      RA Area s, MOD A4C        16.8 cm²  RV Mid (RVID2):   2.6 cm      RA Area s, MOD A4C, i BSA 8.16 cm²/m²  RV Major (RVID3): 5.1 cm       LVOT / RVOT/ Qp/Qs Data: (Indexed to BSA)  LVOT Vmax:      0.87 m/s  LVOT Vmn:       0.590 m/s  LVOT VTI:       18.90 cm  LVOT peak grad: 3 mmHg  LVOT mean grad: 1.5 mmHg    Aortic Valve Measurements:  AV Vmax:                3.5 m/s  AV Peak Gradient:       49.3 mmHg  AV Mean Gradient:       26.0 mmHg  AV VTI:                 75.3 cm  AV VTI Ratio:           0.25  AoV Dimensionless Index 0.25    Mitral Valve Measurements:     MV E Vmax: 1.4 m/s  MV A Vmax: 1.1 m/s  MV E/A:    1.3       Tricuspid Valve Measurements:     TR Vmax:          3.1 m/s  TR Peak Gradient: 38.7 mmHg  RA Pressure:      3 mmHg  PASP:             42 mmHg

## 2025-06-04 NOTE — CONSULT NOTE ADULT - SUBJECTIVE AND OBJECTIVE BOX
Source: patient and Chart    HPI:  Patient is a 84y old  Male with PMHX of CAD (LHC 3/18/21 mild LAD 60%, OM1 60%, Prox  ), HFrEF (JEAN 24 LVEF 35%, trace TR, MR), Afib, anemia, renal artery stenosis s/p L renal stent, HTN, prstate CA/ MDS currently on chemotherapy via PICC line, last dose was in mid April.  Pt comes in with complaints of SOB, weight gain and found to be hypoxic in ED with O2 sat 80s, was placed on BIPAP. Labs show proBNP 6482, lactate 2.5 now improved to 1.5, BUN/Cr 41/2.55, H/H 8.3/25, trop 171/166. CXR shows b/l pleural effusions. Patient is admitted for ADHF. HF consulted and pt. is on Bumex gtt. On Tele monitor, patient noted to have episodes of PAT and EP is consulted. Patient denies palpitation, CP, dizziness and remains asymptomatic. TTE shows EF 40-45%, inferior and inferolaeral wall hypokinesis. Mild to mod. MR, moderately dilated LA, severe AS.           PAST MEDICAL & SURGICAL HISTORY:  HTN - Hypertension      Hypercholesterolemia      PC (prostate cancer)  s/p surgical resection 3 years ago      Gout      Aneurysm, ascending aorta      H/O prostatectomy      H/O carotid endarterectomy      H/O renal artery stenosis  s/p stent in Left RA      Status post cataract extraction, unspecified laterality            MEDICATIONS  (STANDING):  allopurinol 200 milliGRAM(s) Oral daily  apixaban      apixaban 2.5 milliGRAM(s) Oral two times a day  bumetanide Infusion 2 mG/Hr (10 mL/Hr) IV Continuous <Continuous>  chlorhexidine 2% Cloths 1 Application(s) Topical <User Schedule>  clopidogrel Tablet 75 milliGRAM(s) Oral daily  gabapentin 300 milliGRAM(s) Oral two times a day  hydrALAZINE 25 milliGRAM(s) Oral every 8 hours  levoFLOXacin  Tablet 500 milliGRAM(s) Oral once  nitroglycerin  Infusion 10 MICROgram(s)/Min (3 mL/Hr) IV Continuous <Continuous>  pantoprazole    Tablet 40 milliGRAM(s) Oral before breakfast  potassium chloride    Tablet ER 40 milliEquivalent(s) Oral once  rosuvastatin 10 milliGRAM(s) Oral at bedtime    MEDICATIONS  (PRN):  melatonin 3 milliGRAM(s) Oral at bedtime PRN Insomnia      FAMILY HISTORY:  No pertinent family history in first degree relatives        SOCIAL HISTORY:    LIVING SITUATION:  CIGARETTES: Denied  ALCOHOL: denied   ILLICIT DRUG USES: denied    REVIEW OF SYSTEMS:  CONSTITUTIONAL: No fever, weight loss, chills, shakes, or fatigue, + SOB   EYES: No eye pain, visual disturbances, or discharge  ENMT:  No difficulty hearing, tinnitus, vertigo; No sinus or throat pain  NECK: No pain or stiffness  RESPIRATORY: No cough, wheezing, hemoptysis, or shortness of breath  CARDIOVASCULAR: No chest pain, dyspnea, palpitations, dizziness, syncope, paroxysmal nocturnal dyspnea, orthopnea, or arm or leg swelling  GASTROINTESTINAL: No abdominal  or epigastric pain, nausea, vomiting, hematemesis, diarrhea, constipation, melena or bright red blood.  GENITOURINARY: No dysuria, nocturia, hematuria, or urinary incontinence  NEUROLOGICAL: No headaches, memory loss, slurred speech, limb weakness, loss of strength, numbness, or tremors  MUSCULOSKELETAL: No joint pain or swelling, muscle, back, or extremity pain  PSYCHIATRIC: No depression, anxiety, or difficulty sleeping        Vital Signs Last 24 Hrs  T(C): 36.9 (2025 16:00), Max: 37.4 (2025 22:50)  T(F): 98.5 (2025 16:00), Max: 99.3 (2025 22:50)  HR: 114 (2025 16:41) (80 - 892)  BP: 130/59 (2025 16:00) (101/69 - 161/96)  BP(mean): 72 (2025 16:00) (72 - 110)  RR: 22 (2025 16:41) (13 - 23)  SpO2: 90% (2025 16:41) (90% - 100%)    Parameters below as of 2025 16:41  Patient On (Oxygen Delivery Method): nasal cannula  O2 Flow (L/min): 5      PHYSICAL EXAM:  GENERAL: Well appearing, speaking in full sentence, in NAD  HEAD:  Atraumatic, Normocephalic  EYES: EOMI, PERRLA, conjunctiva and sclera clear  ENMT: No tonsillar erythema, exudates, or enlargement; Moist mucous membranes, Good dentition, No lesions  NECK: Supple and normal thyroid.  No JVD or carotid bruit.  Carotid pulse is 2+ bilaterally.  HEART: S1S2 tachycardiac; systolic murmurs, no rubs, or gallops appreciated .  PULMONARY: CTABL, normal respiratory effort.    ABDOMEN: Bowel sounds present, soft, NDNT  EXTREMITIES:  Warm, well -perfused, distal pulses present, + trace edema B/l   NEUROLOGICAL:AOx3    INTERPRETATION OF TELEMETRY: SR at 80s with intermittent PAT at 110s, PVCs    ECG: ectopic atrial tachycardia at 121 bpm, PVC    I&O's Detail    2025 07:01  -  2025 07:00  --------------------------------------------------------  IN:    Bumetanide: 40 mL    Nitroglycerin: 120 mL  Total IN: 160 mL    OUT:    Indwelling Catheter - Urethral (mL): 1175 mL    Voided (mL): 1250 mL  Total OUT: 2425 mL    Total NET: -2265 mL      2025 07:01  -  2025 17:25  --------------------------------------------------------  IN:    Bumetanide: 50 mL    Nitroglycerin: 150 mL  Total IN: 200 mL    OUT:    Indwelling Catheter - Urethral (mL): 1320 mL  Total OUT: 1320 mL    Total NET: -1120 mL          LABS:                        7.1    2.52  )-----------( 52       ( 2025 15:45 )             20.8     06-04    144  |  105  |  40[H]  ----------------------------<  120[H]  3.6   |  24  |  2.54[H]    Ca    8.1[L]      2025 15:45  Phos  4.1     06-04  Mg     2.40     06-04    TPro  6.2  /  Alb  3.6  /  TBili  0.6  /  DBili  x   /  AST  13  /  ALT  7   /  AlkPhos  91  06-04    CARDIAC MARKERS ( 2025 20:07 )  x     / x     / x     / x     / 7.3 ng/mL      PT/INR - ( 2025 04:37 )   PT: 14.2 sec;   INR: 1.20 ratio         PTT - ( 2025 04:37 )  PTT:28.1 sec  Urinalysis Basic - ( 2025 15:45 )    Color: x / Appearance: x / SG: x / pH: x  Gluc: 120 mg/dL / Ketone: x  / Bili: x / Urobili: x   Blood: x / Protein: x / Nitrite: x   Leuk Esterase: x / RBC: x / WBC x   Sq Epi: x / Non Sq Epi: x / Bacteria: x      BNP  I&O's Detail    2025 07:01  -  2025 07:00  --------------------------------------------------------  IN:    Bumetanide: 40 mL    Nitroglycerin: 120 mL  Total IN: 160 mL    OUT:    Indwelling Catheter - Urethral (mL): 1175 mL    Voided (mL): 1250 mL  Total OUT: 2425 mL    Total NET: -2265 mL      2025 07:01  -  2025 17:25  --------------------------------------------------------  IN:    Bumetanide: 50 mL    Nitroglycerin: 150 mL  Total IN: 200 mL    OUT:    Indwelling Catheter - Urethral (mL): 1320 mL  Total OUT: 1320 mL    Total NET: -1120 mL        Daily     Daily Weight in k.4 (2025 05:00)    RADIOLOGY & ADDITIONAL STUDIES:  CONCLUSIONS:      1. The left ventricular cavity is normal in size. Left ventricular wall thickness is normal. Left ventricular systolic function is mildly to moderately decreased with an ejection fraction visually estimated at 40 to 45%. There are regional wall motion abnormalities present. Hypokinesis of the inferior and inferolateral walls.   2. Normal right ventricular cavity size and probably normal right ventricular systolic function. Tricuspid annular plane systolic excursion (TAPSE) is 2.0 cm (normal >=1.7 cm).   3. Structurally normal mitral valve with normal leaflet excursion. There is calcification of the mitral valve annulus. There is mild leaflet calcification. There is mild to moderatemitral regurgitation.   4. The aortic valve anatomy cannot be determined. There is calcification of the aortic valve leaflets. The peak transaortic velocity is 3.51 m/s, peak transaortic gradient is 49.3 mmHg and mean transaortic gradient is 26.0 mmHg with an LVOT/aortic valve VTI ratio of 0.25. The gross appearance of the aortic valve is consistent with significant aortic stenosis. However, unable to accurately estimate the aortic valve area. There is mild aortic regurgitation.   5. The left atrium is moderately dilated with an indexed volume of 45.18 ml/m².   6. The tricuspid valve is structurally normal with normal leaflet excursion. There is mild tricuspid regurgitation. Estimated pulmonary artery systolic pressure is 42 mmHg, consistent with mild pulmonary hypertension.   7. Left pleural effusion noted.   8. No prior echocardiogram is available for comparison.

## 2025-06-04 NOTE — PROGRESS NOTE ADULT - SUBJECTIVE AND OBJECTIVE BOX
FOLLOW UP:  Shortness of breath    SUBJECTIVE/OBSERVATIONS:  Patient seen and examined. Patient remains with BIPAP on. He remains short of breath.    INTERVAL EVENTS:  Transferred to the CCU last night for Bumex gtt and nitro gtt.    TELE EVENTS:   Normal sinus rhythm    Vital Signs Last 24 Hrs  T(C): 36.5 (04 Jun 2025 04:00), Max: 37.4 (03 Jun 2025 22:50)  T(F): 97.7 (04 Jun 2025 04:00), Max: 99.3 (03 Jun 2025 22:50)  HR: 86 (04 Jun 2025 06:00) (80 - 892)  BP: 117/75 (04 Jun 2025 06:00) (107/77 - 161/96)  BP(mean): 86 (04 Jun 2025 06:00) (79 - 110)  RR: 17 (04 Jun 2025 06:00) (14 - 23)  SpO2: 93% (04 Jun 2025 06:00) (93% - 100%)    Parameters below as of 04 Jun 2025 06:00  Patient On (Oxygen Delivery Method): BiPAP/CPAP    O2 Concentration (%): 60  I&O's Summary    03 Jun 2025 07:01  -  04 Jun 2025 07:00  --------------------------------------------------------  IN: 160 mL / OUT: 2425 mL / NET: -2265 mL        MEDICATIONS:  aspirin enteric coated 81 milliGRAM(s) Oral daily  bumetanide Infusion 1 mG/Hr IV Continuous <Continuous>  clopidogrel Tablet 75 milliGRAM(s) Oral daily  heparin   Injectable 5000 Unit(s) SubCutaneous every 8 hours  hydrALAZINE 25 milliGRAM(s) Oral every 8 hours  metoprolol tartrate Injectable 5 milliGRAM(s) IV Push every 6 hours PRN  nitroglycerin  Infusion 10 MICROgram(s)/Min IV Continuous <Continuous>  gabapentin 300 milliGRAM(s) Oral two times a day  melatonin 3 milliGRAM(s) Oral at bedtime PRN  pantoprazole    Tablet 40 milliGRAM(s) Oral before breakfast    allopurinol 200 milliGRAM(s) Oral daily  rosuvastatin 10 milliGRAM(s) Oral at bedtime    chlorhexidine 2% Cloths 1 Application(s) Topical <User Schedule>    REVIEW OF SYSTEMS:  CONSTITUTIONAL: endorses weakness  EYES/ENT: No visual changes;  No vertigo or throat pain   NECK: No pain or stiffness  RESPIRATORY: endorses shortness of breath  CARDIOVASCULAR: No chest pain or palpitations  GASTROINTESTINAL: No abdominal or epigastric pain. No nausea, vomiting, or hematemesis; No diarrhea or constipation. No melena or hematochezia.  GENITOURINARY: No dysuria, frequency or hematuria  NEUROLOGICAL: No numbness or weakness  SKIN: No itching, rashes    PHYSICAL EXAM:  General: sick and unstable  Cardiovascular: Normal S1 S2, No JVD, No murmurs, No edema  Respiratory: Lungs clear to auscultation	  Gastrointestinal:  Soft, Non-tender, + BS	  Skin: warm and dry, No rashes, No ecchymoses, No cyanosis	  Extremities: Normal range of motion, No clubbing, cyanosis or edema  Vascular: Peripheral pulses palpable 2+ bilaterally    LABS:	 	    CBC Full  -  ( 04 Jun 2025 04:37 )  WBC Count : 2.73 K/uL  Hemoglobin : 7.4 g/dL  Hematocrit : 22.0 %  Platelet Count - Automated : 56 K/uL  Mean Cell Volume : 105.3 fL  Mean Cell Hemoglobin : 35.4 pg  Mean Cell Hemoglobin Concentration : 33.6 g/dL  Auto Neutrophil # : x  Auto Lymphocyte # : x  Auto Monocyte # : x  Auto Eosinophil # : x  Auto Basophil # : x  Auto Neutrophil % : x  Auto Lymphocyte % : x  Auto Monocyte % : x  Auto Eosinophil % : x  Auto Basophil % : x    06-04    142  |  105  |  41[H]  ----------------------------<  129[H]  3.7   |  23  |  2.51[H]  06-03    140  |  102  |  41[H]  ----------------------------<  162[H]  4.4   |  23  |  2.55[H]    Ca    8.0[L]      04 Jun 2025 04:37  Ca    7.9[L]      03 Jun 2025 06:20  Phos  4.4     06-04  Phos  5.0     06-03  Mg     2.40     06-04  Mg     2.50     06-03    TPro  6.2  /  Alb  3.6  /  TBili  0.6  /  DBili  x   /  AST  13  /  ALT  7   /  AlkPhos  91  06-04  TPro  6.6  /  Alb  3.8  /  TBili  0.6  /  DBili  x   /  AST  14  /  ALT  12  /  AlkPhos  99  06-03          < from: TTE W or WO Ultrasound Enhancing Agent (06.04.25 @ 06:31) >   1. The left ventricular cavity is normal in size. Left ventricular wall thickness is normal. Left ventricular systolic function is mildly to moderately decreased with an ejection fraction visually estimated at 40 to 45%. There are regional wall motion abnormalities present. Hypokinesis of the inferior and inferolateral walls.   2. Normal right ventricular cavity size and probably normal right ventricular systolic function. Tricuspid annular plane systolic excursion (TAPSE) is 2.0 cm (normal >=1.7 cm).   3. Structurally normal mitral valve with normal leaflet excursion. There is calcification of the mitral valve annulus. There is mild leaflet calcification. There is mild to moderatemitral regurgitation.   4. The aortic valve anatomy cannot be determined. There is calcification of the aortic valve leaflets. The peak transaortic velocity is 3.51 m/s, peak transaortic gradient is 49.3 mmHg and mean transaortic gradient is 26.0 mmHg with an LVOT/aortic valve VTI ratio of 0.25. The gross appearance of the aortic valve is consistent with significant aortic stenosis. However, unable to accurately estimate the aortic valve area. There is mild aortic regurgitation.   5. The left atrium is moderately dilated with an indexed volume of 45.18 ml/m².   6. The tricuspid valve is structurally normal with normal leaflet excursion. There is mild tricuspid regurgitation. Estimated pulmonary artery systolic pressure is 42 mmHg, consistent with mild pulmonary hypertension.   7. Left pleural effusion noted.   8. No prior echocardiogram is available for comparison.    ____________________________________________________________________  Recommendations:  Consider JEAN for further evaluation of the aortic valve, if clinically indicated.     ________________________________________________________________________________________  FINDINGS:     Left Ventricle:  The left ventricular cavity is normal in size. Left ventricular wall thicknessis normal. Left ventricular systolic function is mildly to moderately decreased with an ejection fraction visually estimated at 40 to 45%. There are regional wall motion abnormalities present. Hypokinesis of the inferior and inferolateral walls.     Right Ventricle:  The right ventricular cavity is normal in size and right ventricular systolic function is probably normal. Tricuspid annular plane systolic excursion (TAPSE) is 2.0 cm (normal >=1.7 cm).     Left Atrium:  The left atrium is moderately dilated with an indexed volume of 45.18 ml/m².     Right Atrium:  The right atrium is normal in size with an indexed volume of 19.92 ml/m² and an indexed area of 8.16 cm²/m².     Aortic Valve:  The aortic valve anatomy cannot be determined. There iscalcification of the aortic valve leaflets. The peak transaortic velocity is 3.51 m/s, peak transaortic gradient is 49.3 mmHg and mean transaortic gradient is 26.0 mmHg with an LVOT/aortic valve VTI ratio of 0.25. The gross appearance of the aortic valve is consistent with significant aortic stenosis. However, unable to accurately estimate the aortic valve area. There is mild aortic regurgitation.     Mitral Valve:  Structurally normal mitral valve with normal leaflet excursion. There is calcification of the mitral valve annulus. There is mild leaflet calcification. There is mild to moderate mitral regurgitation.     Tricuspid Valve:  The tricuspid valve is structurally normal with normal leaflet excursion. There is mild tricuspid regurgitation. Estimated pulmonary artery systolic pressure is 42 mmHg, consistent with mild pulmonary hypertension.     Pulmonic Valve:  Structurally normal pulmonic valve with normal leaflet excursion. There is trace pulmonic regurgitation.     Aorta:  The aortic arch is not well visualized. The aortic root at the sinuses of Valsalva is normal in size, measuring 3.00 cm (indexed 1.46 cm/m²). The ascending aorta is normal in size.     Pericardium:  No pericardial effusion seen.     Pleura:  Left pleural effusion noted.     Systemic Veins:  The inferior vena cava is normal in size measuring 2.10 cm in diameter, (normal <2.1cm) with normal inspiratory collapse (normal >50%) consistent with normal right atrial pressure (~3, range 0-5mmHg).  ____________________________________________________________________  QUANTITATIVE DATA:  Left Ventricle Measurements: (Indexed to BSA)     IVSd (2D):   0.9 cm  LVPWd (2D):  0.9 cm  LVIDd (2D):  5.5 cm  LVIDs (2D):  4.7 cm  LV Mass:     183 g  88.9 g/m²  Visualized LV EF%: 40 to 45%     MV E Vmax:    1.37 m/s  MV A Vmax:    1.06 m/s  MV E/A:       1.29  e' lateral:   7.51 cm/s  e' medial:    7.29 cm/s  E/e' lateral: 18.24  E/e' medial:  18.79  E/e' Average: 18.51  MV DT:        118 msec    Aorta Measurements: (Normal range) (Indexed to BSA)     Ao Root d     3.00 cm (3.1 - 3.7 cm) 1.46 cm/m²  Ao Asc d, 2D: 2.50  Ao Asc prox:  2.50 cm                1.21 cm/m²            Left Atrium Measurements: (Indexed to BSA)  LA Diam 2D: 4.40 cm         Right Ventricle Measurements: Right Atrial Measurements:     TAPSE:            2.0 cm      RA Vol s, MOD A4C         41.0 ml  RV S' Vmax:       11.90 cm/s  RA Vol s, MOD A4C i BSA   19.92 ml/m²  RV Base (RVID1):  3.0 cm      RA Area s, MOD A4C        16.8 cm²  RV Mid (RVID2):   2.6 cm      RA Area s, MOD A4C, i BSA 8.16 cm²/m²  RV Major (RVID3): 5.1 cm       LVOT / RVOT/ Qp/Qs Data: (Indexed to BSA)  LVOT Vmax:      0.87 m/s  LVOT Vmn:       0.590 m/s  LVOT VTI:       18.90 cm  LVOT peak grad: 3 mmHg  LVOT mean grad: 1.5 mmHg    Aortic Valve Measurements:  AV Vmax:                3.5 m/s  AV Peak Gradient:       49.3 mmHg  AV Mean Gradient:       26.0 mmHg  AV VTI:                 75.3 cm  AV VTI Ratio:           0.25  AoV Dimensionless Index 0.25    Mitral Valve Measurements:     MV E Vmax: 1.4 m/s  MV A Vmax: 1.1 m/s  MV E/A:    1.3       Tricuspid Valve Measurements:     TR Vmax:          3.1 m/s  TR Peak Gradient: 38.7 mmHg  RA Pressure:      3 mmHg  PASP:             42 mmHg    ________________________________________________________________________________________  Electronically signed on 6/4/2025 at 8:06:44 AM by Jhonatan Escobar M.D.         *** Final ***    < end of copied text >    < from: CT Abdomen and Pelvis No Cont (04.18.25 @ 13:02) >  IMPRESSION:  Cholelithiasis. No pericholecystic inflammatory changes.    Stable atrophic changes right kidney compared to left. No hydronephrosis.   Tiny nonobstructing stone lower pole right kidney. No ureteral stones.   2.3 cm cyst upper pole left kidney.    Mild diffuse bladder wall thickening. Please correlate with urinalysis   given history of recurrent UTI.    Please refer to detailed findings otherwise described above.    --- End of Report ---      < end of copied text >  	   FOLLOW UP:  Shortness of breath    SUBJECTIVE/OBSERVATIONS:  Patient seen and examined. Patient remains with BIPAP on. He remains short of breath.    INTERVAL EVENTS:  Transferred to the CCU last night for Bumex gtt and nitro gtt.  -ECHO done late yesterday reveals significant Aortic Stenosis  -telemonitor suggests paroxysmal atrial fibrillation    TELE EVENTS:   Normal sinus rhythm with possible paroxysmal atrial fibrillation    Vital Signs Last 24 Hrs  T(C): 36.5 (04 Jun 2025 04:00), Max: 37.4 (03 Jun 2025 22:50)  T(F): 97.7 (04 Jun 2025 04:00), Max: 99.3 (03 Jun 2025 22:50)  HR: 86 (04 Jun 2025 06:00) (80 - 892)  BP: 117/75 (04 Jun 2025 06:00) (107/77 - 161/96)  BP(mean): 86 (04 Jun 2025 06:00) (79 - 110)  RR: 17 (04 Jun 2025 06:00) (14 - 23)  SpO2: 93% (04 Jun 2025 06:00) (93% - 100%)    Parameters below as of 04 Jun 2025 06:00  Patient On (Oxygen Delivery Method): BiPAP/CPAP    O2 Concentration (%): 60  I&O's Summary    03 Jun 2025 07:01  -  04 Jun 2025 07:00  --------------------------------------------------------  IN: 160 mL / OUT: 2425 mL / NET: -2265 mL      MEDICATIONS:  aspirin enteric coated 81 milliGRAM(s) Oral daily  bumetanide Infusion 1 mG/Hr IV Continuous <Continuous>  clopidogrel Tablet 75 milliGRAM(s) Oral daily  heparin   Injectable 5000 Unit(s) SubCutaneous every 8 hours  hydrALAZINE 25 milliGRAM(s) Oral every 8 hours  metoprolol tartrate Injectable 5 milliGRAM(s) IV Push every 6 hours PRN  nitroglycerin  Infusion 10 MICROgram(s)/Min IV Continuous <Continuous>  gabapentin 300 milliGRAM(s) Oral two times a day  melatonin 3 milliGRAM(s) Oral at bedtime PRN  pantoprazole    Tablet 40 milliGRAM(s) Oral before breakfast    allopurinol 200 milliGRAM(s) Oral daily  rosuvastatin 10 milliGRAM(s) Oral at bedtime    chlorhexidine 2% Cloths 1 Application(s) Topical <User Schedule>    REVIEW OF SYSTEMS:  CONSTITUTIONAL: endorses weakness  EYES/ENT: No visual changes;  No vertigo or throat pain   NECK: No pain or stiffness  RESPIRATORY: endorses shortness of breath  CARDIOVASCULAR: endorses chest pain  GASTROINTESTINAL: No abdominal or epigastric pain. No nausea, vomiting, or hematemesis; No diarrhea or constipation. No melena or hematochezia.  GENITOURINARY: No dysuria, frequency or hematuria  NEUROLOGICAL: No numbness or weakness  SKIN: No itching, rashes    PHYSICAL EXAM:  General: sick and unstable  Cardiovascular: Normal S1 S2, elevated JVD, III/VI systolic murmur  Respiratory: bibasilar crackles  Gastrointestinal: distended, hypoactive bowel sounds  Skin: warm and dry, No rashes, No ecchymoses, No cyanosis	  Extremities: Normal range of motion, No clubbing, cyanosis or edema  Vascular: Peripheral pulses palpable 2+ bilaterally    LABS:	 	    CBC Full  -  ( 04 Jun 2025 04:37 )  WBC Count : 2.73 K/uL  Hemoglobin : 7.4 g/dL  Hematocrit : 22.0 %  Platelet Count - Automated : 56 K/uL  Mean Cell Volume : 105.3 fL  Mean Cell Hemoglobin : 35.4 pg  Mean Cell Hemoglobin Concentration : 33.6 g/dL  Auto Neutrophil # : x  Auto Lymphocyte # : x  Auto Monocyte # : x  Auto Eosinophil # : x  Auto Basophil # : x  Auto Neutrophil % : x  Auto Lymphocyte % : x  Auto Monocyte % : x  Auto Eosinophil % : x  Auto Basophil % : x    06-04    142  |  105  |  41[H]  ----------------------------<  129[H]  3.7   |  23  |  2.51[H]  06-03    140  |  102  |  41[H]  ----------------------------<  162[H]  4.4   |  23  |  2.55[H]    Ca    8.0[L]      04 Jun 2025 04:37  Ca    7.9[L]      03 Jun 2025 06:20  Phos  4.4     06-04  Phos  5.0     06-03  Mg     2.40     06-04  Mg     2.50     06-03    TPro  6.2  /  Alb  3.6  /  TBili  0.6  /  DBili  x   /  AST  13  /  ALT  7   /  AlkPhos  91  06-04  TPro  6.6  /  Alb  3.8  /  TBili  0.6  /  DBili  x   /  AST  14  /  ALT  12  /  AlkPhos  99  06-03      < from: TTE W or WO Ultrasound Enhancing Agent (06.04.25 @ 06:31) >   1. The left ventricular cavity is normal in size. Left ventricular wall thickness is normal. Left ventricular systolic function is mildly to moderately decreased with an ejection fraction visually estimated at 40 to 45%. There are regional wall motion abnormalities present. Hypokinesis of the inferior and inferolateral walls.   2. Normal right ventricular cavity size and probably normal right ventricular systolic function. Tricuspid annular plane systolic excursion (TAPSE) is 2.0 cm (normal >=1.7 cm).   3. Structurally normal mitral valve with normal leaflet excursion. There is calcification of the mitral valve annulus. There is mild leaflet calcification. There is mild to moderatemitral regurgitation.   4. The aortic valve anatomy cannot be determined. There is calcification of the aortic valve leaflets. The peak transaortic velocity is 3.51 m/s, peak transaortic gradient is 49.3 mmHg and mean transaortic gradient is 26.0 mmHg with an LVOT/aortic valve VTI ratio of 0.25. The gross appearance of the aortic valve is consistent with significant aortic stenosis. However, unable to accurately estimate the aortic valve area. There is mild aortic regurgitation.   5. The left atrium is moderately dilated with an indexed volume of 45.18 ml/m².   6. The tricuspid valve is structurally normal with normal leaflet excursion. There is mild tricuspid regurgitation. Estimated pulmonary artery systolic pressure is 42 mmHg, consistent with mild pulmonary hypertension.   7. Left pleural effusion noted.   8. No prior echocardiogram is available for comparison.    ____________________________________________________________________  Recommendations:  Consider JEAN for further evaluation of the aortic valve, if clinically indicated.     ________________________________________________________________________________________  FINDINGS:     Left Ventricle:  The left ventricular cavity is normal in size. Left ventricular wall thicknessis normal. Left ventricular systolic function is mildly to moderately decreased with an ejection fraction visually estimated at 40 to 45%. There are regional wall motion abnormalities present. Hypokinesis of the inferior and inferolateral walls.     Right Ventricle:  The right ventricular cavity is normal in size and right ventricular systolic function is probably normal. Tricuspid annular plane systolic excursion (TAPSE) is 2.0 cm (normal >=1.7 cm).     Left Atrium:  The left atrium is moderately dilated with an indexed volume of 45.18 ml/m².     Right Atrium:  The right atrium is normal in size with an indexed volume of 19.92 ml/m² and an indexed area of 8.16 cm²/m².     Aortic Valve:  The aortic valve anatomy cannot be determined. There iscalcification of the aortic valve leaflets. The peak transaortic velocity is 3.51 m/s, peak transaortic gradient is 49.3 mmHg and mean transaortic gradient is 26.0 mmHg with an LVOT/aortic valve VTI ratio of 0.25. The gross appearance of the aortic valve is consistent with significant aortic stenosis. However, unable to accurately estimate the aortic valve area. There is mild aortic regurgitation.     Mitral Valve:  Structurally normal mitral valve with normal leaflet excursion. There is calcification of the mitral valve annulus. There is mild leaflet calcification. There is mild to moderate mitral regurgitation.     Tricuspid Valve:  The tricuspid valve is structurally normal with normal leaflet excursion. There is mild tricuspid regurgitation. Estimated pulmonary artery systolic pressure is 42 mmHg, consistent with mild pulmonary hypertension.     Pulmonic Valve:  Structurally normal pulmonic valve with normal leaflet excursion. There is trace pulmonic regurgitation.     Aorta:  The aortic arch is not well visualized. The aortic root at the sinuses of Valsalva is normal in size, measuring 3.00 cm (indexed 1.46 cm/m²). The ascending aorta is normal in size.     Pericardium:  No pericardial effusion seen.     Pleura:  Left pleural effusion noted.     Systemic Veins:  The inferior vena cava is normal in size measuring 2.10 cm in diameter, (normal <2.1cm) with normal inspiratory collapse (normal >50%) consistent with normal right atrial pressure (~3, range 0-5mmHg).  ____________________________________________________________________  QUANTITATIVE DATA:  Left Ventricle Measurements: (Indexed to BSA)     IVSd (2D):   0.9 cm  LVPWd (2D):  0.9 cm  LVIDd (2D):  5.5 cm  LVIDs (2D):  4.7 cm  LV Mass:     183 g  88.9 g/m²  Visualized LV EF%: 40 to 45%     MV E Vmax:    1.37 m/s  MV A Vmax:    1.06 m/s  MV E/A:       1.29  e' lateral:   7.51 cm/s  e' medial:    7.29 cm/s  E/e' lateral: 18.24  E/e' medial:  18.79  E/e' Average: 18.51  MV DT:        118 msec    Aorta Measurements: (Normal range) (Indexed to BSA)     Ao Root d     3.00 cm (3.1 - 3.7 cm) 1.46 cm/m²  Ao Asc d, 2D: 2.50  Ao Asc prox:  2.50 cm                1.21 cm/m²            Left Atrium Measurements: (Indexed to BSA)  LA Diam 2D: 4.40 cm         Right Ventricle Measurements: Right Atrial Measurements:     TAPSE:            2.0 cm      RA Vol s, MOD A4C         41.0 ml  RV S' Vmax:       11.90 cm/s  RA Vol s, MOD A4C i BSA   19.92 ml/m²  RV Base (RVID1):  3.0 cm      RA Area s, MOD A4C        16.8 cm²  RV Mid (RVID2):   2.6 cm      RA Area s, MOD A4C, i BSA 8.16 cm²/m²  RV Major (RVID3): 5.1 cm       LVOT / RVOT/ Qp/Qs Data: (Indexed to BSA)  LVOT Vmax:      0.87 m/s  LVOT Vmn:       0.590 m/s  LVOT VTI:       18.90 cm  LVOT peak grad: 3 mmHg  LVOT mean grad: 1.5 mmHg    Aortic Valve Measurements:  AV Vmax:                3.5 m/s  AV Peak Gradient:       49.3 mmHg  AV Mean Gradient:       26.0 mmHg  AV VTI:                 75.3 cm  AV VTI Ratio:           0.25  AoV Dimensionless Index 0.25    Mitral Valve Measurements:     MV E Vmax: 1.4 m/s  MV A Vmax: 1.1 m/s  MV E/A:    1.3       Tricuspid Valve Measurements:     TR Vmax:          3.1 m/s  TR Peak Gradient: 38.7 mmHg  RA Pressure:      3 mmHg  PASP:             42 mmHg    ________________________________________________________________________________________  Electronically signed on 6/4/2025 at 8:06:44 AM by Jhonatan Escobar M.D.         *** Final ***    < end of copied text >    < from: CT Abdomen and Pelvis No Cont (04.18.25 @ 13:02) >  IMPRESSION:  Cholelithiasis. No pericholecystic inflammatory changes.    Stable atrophic changes right kidney compared to left. No hydronephrosis.   Tiny nonobstructing stone lower pole right kidney. No ureteral stones.   2.3 cm cyst upper pole left kidney.    Mild diffuse bladder wall thickening. Please correlate with urinalysis   given history of recurrent UTI.    Please refer to detailed findings otherwise described above.    --- End of Report ---      < end of copied text >  	   FOLLOW UP:  Shortness of breath    SUBJECTIVE/OBSERVATIONS:  Patient seen and examined. Patient remains with BIPAP on. He remains short of breath.    INTERVAL EVENTS:  Transferred to the CCU last night for Bumex gtt and nitro gtt.  -ECHO done late yesterday reveals significant Aortic Stenosis  -telemonitor suggests paroxysmal atrial fibrillation    TELE EVENTS:   Normal sinus rhythm with possible paroxysmal atrial fibrillation    Vital Signs Last 24 Hrs  T(C): 36.5 (04 Jun 2025 04:00), Max: 37.4 (03 Jun 2025 22:50)  T(F): 97.7 (04 Jun 2025 04:00), Max: 99.3 (03 Jun 2025 22:50)  HR: 86 (04 Jun 2025 06:00) (80 - 892)  BP: 117/75 (04 Jun 2025 06:00) (107/77 - 161/96)  BP(mean): 86 (04 Jun 2025 06:00) (79 - 110)  RR: 17 (04 Jun 2025 06:00) (14 - 23)  SpO2: 93% (04 Jun 2025 06:00) (93% - 100%)    Parameters below as of 04 Jun 2025 06:00  Patient On (Oxygen Delivery Method): BiPAP/CPAP    O2 Concentration (%): 60  I&O's Summary    03 Jun 2025 07:01  -  04 Jun 2025 07:00  --------------------------------------------------------  IN: 160 mL / OUT: 2425 mL / NET: -2265 mL      MEDICATIONS:  aspirin enteric coated 81 milliGRAM(s) Oral daily  bumetanide Infusion 1 mG/Hr IV Continuous <Continuous>  clopidogrel Tablet 75 milliGRAM(s) Oral daily  heparin   Injectable 5000 Unit(s) SubCutaneous every 8 hours  hydrALAZINE 25 milliGRAM(s) Oral every 8 hours  metoprolol tartrate Injectable 5 milliGRAM(s) IV Push every 6 hours PRN  nitroglycerin  Infusion 10 MICROgram(s)/Min IV Continuous <Continuous>  gabapentin 300 milliGRAM(s) Oral two times a day  melatonin 3 milliGRAM(s) Oral at bedtime PRN  pantoprazole    Tablet 40 milliGRAM(s) Oral before breakfast    allopurinol 200 milliGRAM(s) Oral daily  rosuvastatin 10 milliGRAM(s) Oral at bedtime    chlorhexidine 2% Cloths 1 Application(s) Topical <User Schedule>    REVIEW OF SYSTEMS:  CONSTITUTIONAL: endorses weakness  EYES/ENT: No visual changes;  No vertigo or throat pain   NECK: No pain or stiffness  RESPIRATORY: endorses shortness of breath  CARDIOVASCULAR: endorses chest pain  GASTROINTESTINAL: No abdominal or epigastric pain. No nausea, vomiting, or hematemesis; No diarrhea or constipation. No melena or hematochezia.  GENITOURINARY: No dysuria, frequency or hematuria  NEUROLOGICAL: No numbness or weakness  SKIN: No itching, rashes    PHYSICAL EXAM:  General: sick and unstable  Cardiovascular: Normal S1 S2, elevated JVD, III/VI systolic murmur  Respiratory: bibasilar crackles  Gastrointestinal: distended, hypoactive bowel sounds  Skin: warm and dry, No rashes, No ecchymoses, No cyanosis	  Extremities: Normal range of motion, No clubbing, cyanosis or edema  Vascular: Peripheral pulses palpable 2+ bilaterally    LABS:	 	    CBC Full  -  ( 04 Jun 2025 04:37 )  WBC Count : 2.73 K/uL  Hemoglobin : 7.4 g/dL  Hematocrit : 22.0 %  Platelet Count - Automated : 56 K/uL  Mean Cell Volume : 105.3 fL  Mean Cell Hemoglobin : 35.4 pg  Mean Cell Hemoglobin Concentration : 33.6 g/dL  Auto Neutrophil # : x  Auto Lymphocyte # : x  Auto Monocyte # : x  Auto Eosinophil # : x  Auto Basophil # : x  Auto Neutrophil % : x  Auto Lymphocyte % : x  Auto Monocyte % : x  Auto Eosinophil % : x  Auto Basophil % : x    06-04    142  |  105  |  41[H]  ----------------------------<  129[H]  3.7   |  23  |  2.51[H]  06-03    140  |  102  |  41[H]  ----------------------------<  162[H]  4.4   |  23  |  2.55[H]    Ca    8.0[L]      04 Jun 2025 04:37  Ca    7.9[L]      03 Jun 2025 06:20  Phos  4.4     06-04  Phos  5.0     06-03  Mg     2.40     06-04  Mg     2.50     06-03    TPro  6.2  /  Alb  3.6  /  TBili  0.6  /  DBili  x   /  AST  13  /  ALT  7   /  AlkPhos  91  06-04  TPro  6.6  /  Alb  3.8  /  TBili  0.6  /  DBili  x   /  AST  14  /  ALT  12  /  AlkPhos  99  06-03  < end of copied text >    < from: Cardiac Cath Lab - Adult (03.18.21 @ 09:35) >  LM:   --  LM: Normal.  LAD:   --  Mid LAD: There was a 60 % stenosis.  CX:   --  OM1: There was a 60 % stenosis.  RCA:   --  Proximal RCA: There was a diffuse 100 % stenosis. 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  COMPLICATIONS: There were no complications.  DIAGNOSTIC RECOMMENDATIONS: Given the CKD, will defer  PCI until after  failure of medical therapy.    < end of copied text >    < from: TTE W or WO Ultrasound Enhancing Agent (06.04.25 @ 06:31) >   1. The left ventricular cavity is normal in size. Left ventricular wall thickness is normal. Left ventricular systolic function is mildly to moderately decreased with an ejection fraction visually estimated at 40 to 45%. There are regional wall motion abnormalities present. Hypokinesis of the inferior and inferolateral walls.   2. Normal right ventricular cavity size and probably normal right ventricular systolic function. Tricuspid annular plane systolic excursion (TAPSE) is 2.0 cm (normal >=1.7 cm).   3. Structurally normal mitral valve with normal leaflet excursion. There is calcification of the mitral valve annulus. There is mild leaflet calcification. There is mild to moderatemitral regurgitation.   4. The aortic valve anatomy cannot be determined. There is calcification of the aortic valve leaflets. The peak transaortic velocity is 3.51 m/s, peak transaortic gradient is 49.3 mmHg and mean transaortic gradient is 26.0 mmHg with an LVOT/aortic valve VTI ratio of 0.25. The gross appearance of the aortic valve is consistent with significant aortic stenosis. However, unable to accurately estimate the aortic valve area. There is mild aortic regurgitation.   5. The left atrium is moderately dilated with an indexed volume of 45.18 ml/m².   6. The tricuspid valve is structurally normal with normal leaflet excursion. There is mild tricuspid regurgitation. Estimated pulmonary artery systolic pressure is 42 mmHg, consistent with mild pulmonary hypertension.   7. Left pleural effusion noted.   8. No prior echocardiogram is available for comparison.    ____________________________________________________________________  Recommendations:  Consider JEAN for further evaluation of the aortic valve, if clinically indicated.     ________________________________________________________________________________________  FINDINGS:     Left Ventricle:  The left ventricular cavity is normal in size. Left ventricular wall thicknessis normal. Left ventricular systolic function is mildly to moderately decreased with an ejection fraction visually estimated at 40 to 45%. There are regional wall motion abnormalities present. Hypokinesis of the inferior and inferolateral walls.     Right Ventricle:  The right ventricular cavity is normal in size and right ventricular systolic function is probably normal. Tricuspid annular plane systolic excursion (TAPSE) is 2.0 cm (normal >=1.7 cm).     Left Atrium:  The left atrium is moderately dilated with an indexed volume of 45.18 ml/m².     Right Atrium:  The right atrium is normal in size with an indexed volume of 19.92 ml/m² and an indexed area of 8.16 cm²/m².     Aortic Valve:  The aortic valve anatomy cannot be determined. There iscalcification of the aortic valve leaflets. The peak transaortic velocity is 3.51 m/s, peak transaortic gradient is 49.3 mmHg and mean transaortic gradient is 26.0 mmHg with an LVOT/aortic valve VTI ratio of 0.25. The gross appearance of the aortic valve is consistent with significant aortic stenosis. However, unable to accurately estimate the aortic valve area. There is mild aortic regurgitation.     Mitral Valve:  Structurally normal mitral valve with normal leaflet excursion. There is calcification of the mitral valve annulus. There is mild leaflet calcification. There is mild to moderate mitral regurgitation.     Tricuspid Valve:  The tricuspid valve is structurally normal with normal leaflet excursion. There is mild tricuspid regurgitation. Estimated pulmonary artery systolic pressure is 42 mmHg, consistent with mild pulmonary hypertension.     Pulmonic Valve:  Structurally normal pulmonic valve with normal leaflet excursion. There is trace pulmonic regurgitation.     Aorta:  The aortic arch is not well visualized. The aortic root at the sinuses of Valsalva is normal in size, measuring 3.00 cm (indexed 1.46 cm/m²). The ascending aorta is normal in size.     Pericardium:  No pericardial effusion seen.     Pleura:  Left pleural effusion noted.     Systemic Veins:  The inferior vena cava is normal in size measuring 2.10 cm in diameter, (normal <2.1cm) with normal inspiratory collapse (normal >50%) consistent with normal right atrial pressure (~3, range 0-5mmHg).  ____________________________________________________________________  QUANTITATIVE DATA:  Left Ventricle Measurements: (Indexed to BSA)     IVSd (2D):   0.9 cm  LVPWd (2D):  0.9 cm  LVIDd (2D):  5.5 cm  LVIDs (2D):  4.7 cm  LV Mass:     183 g  88.9 g/m²  Visualized LV EF%: 40 to 45%     MV E Vmax:    1.37 m/s  MV A Vmax:    1.06 m/s  MV E/A:       1.29  e' lateral:   7.51 cm/s  e' medial:    7.29 cm/s  E/e' lateral: 18.24  E/e' medial:  18.79  E/e' Average: 18.51  MV DT:        118 msec    Aorta Measurements: (Normal range) (Indexed to BSA)     Ao Root d     3.00 cm (3.1 - 3.7 cm) 1.46 cm/m²  Ao Asc d, 2D: 2.50  Ao Asc prox:  2.50 cm                1.21 cm/m²            Left Atrium Measurements: (Indexed to BSA)  LA Diam 2D: 4.40 cm         Right Ventricle Measurements: Right Atrial Measurements:     TAPSE:            2.0 cm      RA Vol s, MOD A4C         41.0 ml  RV S' Vmax:       11.90 cm/s  RA Vol s, MOD A4C i BSA   19.92 ml/m²  RV Base (RVID1):  3.0 cm      RA Area s, MOD A4C        16.8 cm²  RV Mid (RVID2):   2.6 cm      RA Area s, MOD A4C, i BSA 8.16 cm²/m²  RV Major (RVID3): 5.1 cm       LVOT / RVOT/ Qp/Qs Data: (Indexed to BSA)  LVOT Vmax:      0.87 m/s  LVOT Vmn:       0.590 m/s  LVOT VTI:       18.90 cm  LVOT peak grad: 3 mmHg  LVOT mean grad: 1.5 mmHg    Aortic Valve Measurements:  AV Vmax:                3.5 m/s  AV Peak Gradient:       49.3 mmHg  AV Mean Gradient:       26.0 mmHg  AV VTI:                 75.3 cm  AV VTI Ratio:           0.25  AoV Dimensionless Index 0.25    Mitral Valve Measurements:     MV E Vmax: 1.4 m/s  MV A Vmax: 1.1 m/s  MV E/A:    1.3       Tricuspid Valve Measurements:     TR Vmax:          3.1 m/s  TR Peak Gradient: 38.7 mmHg  RA Pressure:      3 mmHg  PASP:             42 mmHg    ________________________________________________________________________________________  Electronically signed on 6/4/2025 at 8:06:44 AM by Jhonatan Escobar M.D.         *** Final ***    < end of copied text >    < from: CT Abdomen and Pelvis No Cont (04.18.25 @ 13:02) >  IMPRESSION:  Cholelithiasis. No pericholecystic inflammatory changes.    Stable atrophic changes right kidney compared to left. No hydronephrosis.   Tiny nonobstructing stone lower pole right kidney. No ureteral stones.   2.3 cm cyst upper pole left kidney.    Mild diffuse bladder wall thickening. Please correlate with urinalysis   given history of recurrent UTI.    Please refer to detailed findings otherwise described above.    --- End of Report ---      < end of copied text >

## 2025-06-04 NOTE — CONSULT NOTE ADULT - ASSESSMENT
Patient is a 84y old  Male with PMHX of CAD (LHC 3/18/21 mild LAD 60%, OM1 60%, Prox  ), HFrEF (JEAN 12/26/24 LVEF 35%, trace TR, MR), Afib, anemia, renal artery stenosis s/p L renal stent, HTN, prstate CA/ MDS currently on chemotherapy via PICC line, last dose was in mid April.  Pt comes in with complaints of SOB, weight gain and found to be hypoxic in ED with O2 sat 80s, was placed on BIPAP. Labs show proBNP 6482, lactate 2.5 now improved to 1.5, BUN/Cr 41/2.55, H/H 8.3/25, trop 171/166. CXR shows b/l pleural effusions. Patient is admitted for ADHF. HF consulted and pt. is on Bumex gtt. On Tele monitor, patient noted to have episodes of PAT and EP is consulted. Patient denies palpitation, CP, dizziness and remains asymptomatic. TTE shows EF 40-45%, inferior and inferolaeral wall hypokinesis. Mild to mod. MR, moderately dilated LA, severe AS. Cardiologist Dr. Gabriel Reddy    ## ADHF  ## Hx of CAD  ## AFib  ## PAT   ## AS       RECOMMENDATIONS:  - Continuous telemetric monitoring, overall in SR with transient episode of PAT   - Monitor electrolytes and replete K to 4 and Mg to 2  - Recommend Eliquis 2.5mg BID ( age, Cr. wgt) for thromboembolic ppx if no clinical contraindication, [id number] score 6  - Appreciate advanced HF rec, continue Bumex and strict I/O and daily weight  - No EP intervention at this time and EP will sign off , please call back if any questions   - Continue care per primary team    Patient to be staffed with attending. Please await attending addendum

## 2025-06-04 NOTE — PROGRESS NOTE ADULT - ASSESSMENT
85 y/o male with PMHX of CAD (LHC 3/18/21 mild LAD 60%, OM1 60%, Prox  ), HFrEF (JEAN 12/26/24 LVEF 35%, trace TR, MR), afib, anemia, renal artery stenosis s/p L renal stent, HTN, prstate CA/ MDS currently on chemotherapy via PICC line, last dose was in mid April.  Pt comes in with complaints of SOB and weight gain. He was found to be hypoxic in ED with O2 sat 80s, was placed on BIPAP. HF consult called on 6/3/25 to help manage care in this patient who is suspected to be in ADHF. Labs show proBNP 6482, lactate 2.5 now improved to 1.5, BUN/Cr 41/2.55, H/H 8.3/25, trop 171/166. CXR shows b/l pleural effusions. Pt still on BIPAP, pt's aide helped answer questions. Removing BIPAP sat dropped to 71%. Pt lives alone with live in aide. He admits to eating very salty meals- frozen dinners frequently. He is compliant with taking all HF medications, given to him by his aide. Overall stage C HF, NYHA class IV-severely hypervolemic and hypoxic requiring BIPAP.

## 2025-06-04 NOTE — PROGRESS NOTE ADULT - PROBLEM SELECTOR PLAN 1
Bumex 1mg/hr  Nitroglycerine 10mcg/min; Wean as tolerated   Hydralazine 25mg Q8H (hold SBP<100)  Strict I/O  Daily Standing Weights  Monitor Lytes Replete K>4.0andMg>2.0  Trend SCr  TTE revealed severe AS; Plan for TAVR evaluation this admission  Appreciate Nephrology recommendations   Management per CCU team  Pending Final Recommendations from HF Attending

## 2025-06-05 LAB
-  AMPICILLIN: SIGNIFICANT CHANGE UP
-  CIPROFLOXACIN: SIGNIFICANT CHANGE UP
-  LEVOFLOXACIN: SIGNIFICANT CHANGE UP
-  NITROFURANTOIN: SIGNIFICANT CHANGE UP
-  TETRACYCLINE: SIGNIFICANT CHANGE UP
-  VANCOMYCIN: SIGNIFICANT CHANGE UP
ALBUMIN SERPL ELPH-MCNC: 3.6 G/DL — SIGNIFICANT CHANGE UP (ref 3.3–5)
ALP SERPL-CCNC: 88 U/L — SIGNIFICANT CHANGE UP (ref 40–120)
ALT FLD-CCNC: 6 U/L — SIGNIFICANT CHANGE UP (ref 4–41)
ANION GAP SERPL CALC-SCNC: 16 MMOL/L — HIGH (ref 7–14)
AST SERPL-CCNC: 12 U/L — SIGNIFICANT CHANGE UP (ref 4–40)
BASOPHILS # BLD AUTO: 0.02 K/UL — SIGNIFICANT CHANGE UP (ref 0–0.2)
BASOPHILS NFR BLD AUTO: 0.8 % — SIGNIFICANT CHANGE UP (ref 0–2)
BILIRUB SERPL-MCNC: 1.3 MG/DL — HIGH (ref 0.2–1.2)
BLOOD GAS ARTERIAL COMPREHENSIVE RESULT: SIGNIFICANT CHANGE UP
BUN SERPL-MCNC: 42 MG/DL — HIGH (ref 7–23)
CALCIUM SERPL-MCNC: 8 MG/DL — LOW (ref 8.4–10.5)
CHLORIDE SERPL-SCNC: 106 MMOL/L — SIGNIFICANT CHANGE UP (ref 98–107)
CO2 SERPL-SCNC: 25 MMOL/L — SIGNIFICANT CHANGE UP (ref 22–31)
CREAT SERPL-MCNC: 2.76 MG/DL — HIGH (ref 0.5–1.3)
CULTURE RESULTS: ABNORMAL
EGFR: 22 ML/MIN/1.73M2 — LOW
EGFR: 22 ML/MIN/1.73M2 — LOW
EOSINOPHIL # BLD AUTO: 0.06 K/UL — SIGNIFICANT CHANGE UP (ref 0–0.5)
EOSINOPHIL NFR BLD AUTO: 2.4 % — SIGNIFICANT CHANGE UP (ref 0–6)
GLUCOSE SERPL-MCNC: 110 MG/DL — HIGH (ref 70–99)
HCT VFR BLD CALC: 23.6 % — LOW (ref 39–50)
HGB BLD-MCNC: 7.9 G/DL — LOW (ref 13–17)
IANC: 1.5 K/UL — LOW (ref 1.8–7.4)
IMM GRANULOCYTES NFR BLD AUTO: 0.8 % — SIGNIFICANT CHANGE UP (ref 0–0.9)
LYMPHOCYTES # BLD AUTO: 0.72 K/UL — LOW (ref 1–3.3)
LYMPHOCYTES # BLD AUTO: 29.1 % — SIGNIFICANT CHANGE UP (ref 13–44)
MAGNESIUM SERPL-MCNC: 2.3 MG/DL — SIGNIFICANT CHANGE UP (ref 1.6–2.6)
MCHC RBC-ENTMCNC: 33.5 G/DL — SIGNIFICANT CHANGE UP (ref 32–36)
MCHC RBC-ENTMCNC: 34.1 PG — HIGH (ref 27–34)
MCV RBC AUTO: 101.7 FL — HIGH (ref 80–100)
METHOD TYPE: SIGNIFICANT CHANGE UP
MONOCYTES # BLD AUTO: 0.15 K/UL — SIGNIFICANT CHANGE UP (ref 0–0.9)
MONOCYTES NFR BLD AUTO: 6.1 % — SIGNIFICANT CHANGE UP (ref 2–14)
NEUTROPHILS # BLD AUTO: 1.5 K/UL — LOW (ref 1.8–7.4)
NEUTROPHILS NFR BLD AUTO: 60.8 % — SIGNIFICANT CHANGE UP (ref 43–77)
NRBC # BLD AUTO: 0 K/UL — SIGNIFICANT CHANGE UP (ref 0–0)
NRBC # FLD: 0 K/UL — SIGNIFICANT CHANGE UP (ref 0–0)
NRBC BLD AUTO-RTO: 0 /100 WBCS — SIGNIFICANT CHANGE UP (ref 0–0)
ORGANISM # SPEC MICROSCOPIC CNT: ABNORMAL
ORGANISM # SPEC MICROSCOPIC CNT: ABNORMAL
PHOSPHATE SERPL-MCNC: 4 MG/DL — SIGNIFICANT CHANGE UP (ref 2.5–4.5)
PLATELET # BLD AUTO: 53 K/UL — LOW (ref 150–400)
POTASSIUM SERPL-MCNC: 3.7 MMOL/L — SIGNIFICANT CHANGE UP (ref 3.5–5.3)
POTASSIUM SERPL-SCNC: 3.7 MMOL/L — SIGNIFICANT CHANGE UP (ref 3.5–5.3)
PROT SERPL-MCNC: 6.2 G/DL — SIGNIFICANT CHANGE UP (ref 6–8.3)
RBC # BLD: 2.32 M/UL — LOW (ref 4.2–5.8)
RBC # FLD: 23.6 % — HIGH (ref 10.3–14.5)
SODIUM SERPL-SCNC: 147 MMOL/L — HIGH (ref 135–145)
SPECIMEN SOURCE: SIGNIFICANT CHANGE UP
WBC # BLD: 2.47 K/UL — LOW (ref 3.8–10.5)
WBC # FLD AUTO: 2.47 K/UL — LOW (ref 3.8–10.5)

## 2025-06-05 PROCEDURE — 99291 CRITICAL CARE FIRST HOUR: CPT

## 2025-06-05 PROCEDURE — 99223 1ST HOSP IP/OBS HIGH 75: CPT

## 2025-06-05 RX ORDER — ACETAMINOPHEN 500 MG/5ML
650 LIQUID (ML) ORAL ONCE
Refills: 0 | Status: COMPLETED | OUTPATIENT
Start: 2025-06-05 | End: 2025-06-05

## 2025-06-05 RX ORDER — ROSUVASTATIN CALCIUM 20 MG/1
20 TABLET, FILM COATED ORAL AT BEDTIME
Refills: 0 | Status: DISCONTINUED | OUTPATIENT
Start: 2025-06-05 | End: 2025-06-13

## 2025-06-05 RX ADMIN — APIXABAN 2.5 MILLIGRAM(S): 2.5 TABLET, FILM COATED ORAL at 05:37

## 2025-06-05 RX ADMIN — Medication 37.5 MILLIGRAM(S): at 14:09

## 2025-06-05 RX ADMIN — Medication 1 APPLICATION(S): at 05:57

## 2025-06-05 RX ADMIN — Medication 37.5 MILLIGRAM(S): at 22:02

## 2025-06-05 RX ADMIN — Medication 40 MILLIGRAM(S): at 05:37

## 2025-06-05 RX ADMIN — Medication 650 MILLIGRAM(S): at 23:58

## 2025-06-05 RX ADMIN — NITROGLYCERIN 3 MICROGRAM(S)/MIN: 20 OINTMENT TOPICAL at 07:23

## 2025-06-05 RX ADMIN — Medication 3 MILLIGRAM(S): at 22:01

## 2025-06-05 RX ADMIN — GABAPENTIN 300 MILLIGRAM(S): 400 CAPSULE ORAL at 05:37

## 2025-06-05 RX ADMIN — APIXABAN 2.5 MILLIGRAM(S): 2.5 TABLET, FILM COATED ORAL at 17:19

## 2025-06-05 RX ADMIN — ROSUVASTATIN CALCIUM 20 MILLIGRAM(S): 20 TABLET, FILM COATED ORAL at 22:01

## 2025-06-05 RX ADMIN — BUMETANIDE 10 MG/HR: 1 TABLET ORAL at 08:28

## 2025-06-05 RX ADMIN — GABAPENTIN 300 MILLIGRAM(S): 400 CAPSULE ORAL at 17:19

## 2025-06-05 RX ADMIN — Medication 40 MILLIEQUIVALENT(S): at 05:37

## 2025-06-05 RX ADMIN — CLOPIDOGREL BISULFATE 75 MILLIGRAM(S): 75 TABLET, FILM COATED ORAL at 11:51

## 2025-06-05 RX ADMIN — Medication 200 MILLIGRAM(S): at 11:51

## 2025-06-05 NOTE — PROGRESS NOTE ADULT - SUBJECTIVE AND OBJECTIVE BOX
PATIENT REQUESTING PAIN MEDICATION REFILL.   [facility]   Zheng Velasco NP  CCU Service  Cardiology   Spect: [id number] ([facility])     PATIENT: MASOOD MACARIO, MRN: [id number]    CHIEF COMPLAINT: Patient is a 84y old  Male who presents with a chief complaint of Fluid overload (2025 17:24)      INTERVAL HISTORY/OVERNIGHT EVENTS: Still with SOB on BiPAP. Denies abdominal pain, chest pain. Bedrest. Oriented to person, place, and time.    REVIEW OF SYSTEMS:    Constitutional:     [ ] negative [ ] fevers [ ] chills [ ] weight loss [ ] weight gain  HEENT:                  [ ] negative [ ] dry eyes [ ] eye irritation [ ] postnasal drip [ ] nasal congestion  CV:                         [ ] negative  [ ] chest pain [ ] orthopnea [ ] palpitations [ ] murmur  Resp:                     [ ] negative [ ] cough [x ] shortness of breath [ ] dyspnea [ ] wheezing [ ] sputum [ ] hemoptysis  GI:                          [ ] negative [ ] nausea [ ] vomiting [ ] diarrhea [ ] constipation [ ] abd pain [ ] dysphagia   :                        [ ] negative [ ] dysuria [ ] nocturia [ ] hematuria [ ] increased urinary frequency  Musculoskeletal: [ ] negative [ ] back pain [ ] myalgias [ ] arthralgias [ ] fracture  Skin:                       [ ] negative [ ] rash [ ] itch  Neurological:        [ ] negative [ ] headache [ ] dizziness [ ] syncope [ ] weakness [ ] numbness  Psychiatric:           [ ] negative [x] anxiety [ ] depression  Endocrine:            [ ] negative [ ] diabetes [ ] thyroid problem  Heme/Lymph:      [ ] negative [x ] anemia [ ] bleeding problem  Allergic/Immune: [ ] negative [ ] itchy eyes [ ] nasal discharge [ ] hives [ ] angioedema    [ x] All other systems negative  [ ] Unable to assess ROS because ________.    MEDICATIONS:  MEDICATIONS  (STANDING):  allopurinol 200 milliGRAM(s) Oral daily  apixaban      apixaban 2.5 milliGRAM(s) Oral two times a day  bumetanide Infusion 2 mG/Hr (10 mL/Hr) IV Continuous <Continuous>  chlorhexidine 2% Cloths 1 Application(s) Topical <User Schedule>  clopidogrel Tablet 75 milliGRAM(s) Oral daily  gabapentin 300 milliGRAM(s) Oral two times a day  hydrALAZINE 25 milliGRAM(s) Oral every 8 hours  levoFLOXacin  Tablet 250 milliGRAM(s) Oral every 24 hours  nitroglycerin  Infusion 10 MICROgram(s)/Min (3 mL/Hr) IV Continuous <Continuous>  pantoprazole    Tablet 40 milliGRAM(s) Oral before breakfast  rosuvastatin 10 milliGRAM(s) Oral at bedtime    MEDICATIONS  (PRN):  melatonin 3 milliGRAM(s) Oral at bedtime PRN Insomnia      ALLERGIES: Allergies    No Known Allergies    Intolerances        OBJECTIVE:  ICU Vital Signs Last 24 Hrs  T(C): 36.9 (2025 04:00), Max: 37.5 (2025 20:00)  T(F): 98.4 (2025 04:00), Max: 99.5 (2025 20:00)  HR: 94 (2025 07:00) (86 - 120)  BP: 110/67 (2025 07:00) (101/69 - 137/96)  BP(mean): 81 (2025 07:00) (70 - 107)  RR: 17 (2025 07:00) (13 - 23)  SpO2: 97% (2025 07:00) (88% - 109%)    O2 Parameters below as of 2025 06:00  Patient On (Oxygen Delivery Method): BiPAP/CPAP    O2 Concentration (%): 40      CAPILLARY BLOOD GLUCOSE        I&O's Summary    2025 07:01  -  2025 07:00  --------------------------------------------------------  IN: 835 mL / OUT: 3765 mL / NET: -2930 mL         Daily Weight in k (2025 04:00)    PHYSICAL EXAMINATION:  General: Cooperative with exam.  HEENT: Moist mucous membranes.  Respiratory: Crackles b/l.  CV: RRR, S1S2, + murmurs, rubs or gallops. Elevated JVD. Distal pulses intact.  Abdominal: Soft, nontender, +distended, no rebound or guarding, normal bowel sounds.  Neurology: AOx3, no focal neuro defects, RICHARDSON x 4.  Extremities: Trace pitting edema, + Peripheral pulses.  Cath site:   Tubes:    LABS:  ABG - ( 2025 03:26 )  pH, Arterial: 7.49  pH, Blood: x     /  pCO2: 37    /  pO2: 150   / HCO3: 28    / Base Excess: 4.6   /  SaO2: 98.9                                    7.9    2.47  )-----------( 53       ( 2025 03:26 )             23.6     06-05    147[H]  |  106  |  42[H]  ----------------------------<  110[H]  3.7   |  25  |  2.76[H]    Ca    8.0[L]      2025 03:26  Phos  4.0     06-  Mg     2.30     06-05    TPro  6.2  /  Alb  3.6  /  TBili  1.3[H]  /  DBili  x   /  AST  12  /  ALT  6   /  AlkPhos  88  06-05    LIVER FUNCTIONS - ( 2025 03:26 )  Alb: 3.6 g/dL / Pro: 6.2 g/dL / ALK PHOS: 88 U/L / ALT: 6 U/L / AST: 12 U/L / GGT: x           PT/INR - ( 2025 04:37 )   PT: 14.2 sec;   INR: 1.20 ratio         PTT - ( 2025 04:37 )  PTT:28.1 sec        Urinalysis Basic - ( 2025 03:26 )    Color: x / Appearance: x / SG: x / pH: x  Gluc: 110 mg/dL / Ketone: x  / Bili: x / Urobili: x   Blood: x / Protein: x / Nitrite: x   Leuk Esterase: x / RBC: x / WBC x   Sq Epi: x / Non Sq Epi: x / Bacteria: x        TELEMETRY: NSR w/ APCs/PVCs     EKG:     IMAGING:   TTE W or WO Ultrasound Enhancing Agent (25 @ 06:31)  CONCLUSIONS:      1. The left ventricular cavity is normal in size. Left ventricular wall thickness is normal. Left ventricular systolic function is mildly to moderately decreased with an ejection fraction visually estimated at 40 to 45%. There are regional wall motion abnormalities present. Hypokinesis of the inferior and inferolateral walls.   2. Normal right ventricular cavity size and probably normal right ventricular systolic function. Tricuspid annular plane systolic excursion (TAPSE) is 2.0 cm (normal >=1.7 cm).   3. Structurally normal mitral valve with normal leaflet excursion. There is calcification of the mitral valve annulus. There is mild leaflet calcification. There is mild to moderatemitral regurgitation.   4. The aortic valve anatomy cannot be determined. There is calcification of the aortic valve leaflets. The peak transaortic velocity is 3.51 m/s, peak transaortic gradient is 49.3 mmHg and mean transaortic gradient is 26.0 mmHg with an LVOT/aortic valve VTI ratio of 0.25. The gross appearance of the aortic valve is consistent with significant aortic stenosis. However, unable to accurately estimate the aortic valve area. There is mild aortic regurgitation.   5. The left atrium is moderately dilated with an indexed volume of 45.18 ml/m².   6. The tricuspid valve is structurally normal with normal leaflet excursion. There is mild tricuspid regurgitation. Estimated pulmonary artery systolic pressure is 42 mmHg, consistent with mild pulmonary hypertension.   7. Left pleural effusion noted.   8. No prior echocardiogram is available for comparison.

## 2025-06-05 NOTE — PROGRESS NOTE ADULT - CRITICAL CARE ATTENDING COMMENT
84 year old man with MDS, HTN HLD, USHA sp stent, AF who presented with progressive dyspnea and fluid overload. Placed on Bipap and started on both bumex and nitro gtt     TTE 6/4/25:   1. The left ventricular cavity is normal in size. Left ventricular wall thickness is normal. Left ventricular systolic function is mildly to moderately decreased with an ejection fraction visually estimated at 40 to 45%. There are regional wall motion abnormalities present. Hypokinesis of the inferior and inferolateral walls.   2. Normal right ventricular cavity size and probably normal right ventricular systolic function. Tricuspid annular plane systolic excursion (TAPSE) is 2.0 cm (normal >=1.7 cm).   3. Structurally normal mitral valve with normal leaflet excursion. There is calcification of the mitral valve annulus. There is mild leaflet calcification. There is mild to moderate mitral regurgitation.   4. The aortic valve anatomy cannot be determined. There is calcification of the aortic valve leaflets. The peak transaortic velocity is 3.51 m/s, peak transaortic gradient is 49.3 mmHg and mean transaortic gradient is 26.0 mmHg with an LVOT/aortic valve VTI ratio of 0.25. The gross appearance of the aortic valve is consistent with significant aortic stenosis. However, unable to accurately estimate the aortic valve area. There is mild aortic regurgitation.   5. The left atrium is moderately dilated with an indexed volume of 45.18 ml/m².   6. The tricuspid valve is structurally normal with normal leaflet excursion. There is mild tricuspid regurgitation. Estimated pulmonary artery systolic pressure is 42 mmHg, consistent with mild pulmonary hypertension.   7. Left pleural effusion noted.   8. No prior echocardiogram is available for comparison.    Meds:  Nitro gtt  Bumex gtt 2 mg/hr  ASA  Plavix  Hydral 25 mg TID  Crestor 10 mg daily  SQ heparin    #Neuro- No active issues  #Pulm- On BiPAP-wean as tolerates  #CV- HF with mild to moderately reduced EF (40-45%), mild to moderate MR, significant AS, AF  Continue Bumex gtt at 2 mg/hr  Nitro gtt-wean   Continue plavix/statin  Eventual workup for AS once stable  HF input appreciated  start eliquis  #Renal- CKD stage 3  Continue to monitor

## 2025-06-05 NOTE — PROGRESS NOTE ADULT - ASSESSMENT
85 y/o male with PMHx of CAD (LHC 3/18/21 mild LAD 60%, OM1 60%, Prox  ), HFrEF (JEAN 12/26/24 LVEF 35%, trace TR, MR), afib, anemia, renal artery stenosis s/p L renal stent, HTN, prstate CA/ MDS currently on chemotherapy via PICC line, last dose was in mid April.  Pt comes in with complaints of SOB and weight gain. He was found to be hypoxic in ED with O2 sat 80s, was placed on BIPAP. HF consult called on 6/3/25 to help manage care in this patient who is suspected to be in ADHF. Labs show proBNP 6482, lactate 2.5 now improved to 1.5, BUN/Cr 41/2.55, H/H 8.3/25, trop 171/166. CXR shows b/l pleural effusions. Pt still on BIPAP, pt's aide helped answer questions. Removing BIPAP sat dropped to 71%. Pt lives alone with live in aide. He admits to eating very salty meals- frozen dinners frequently. He is compliant with taking all HF medications, given to him by his aide. Overall stage C HF, NYHA class IV-severely hypervolemic and hypoxic requiring BIPAP.    Neuro:  -A&Ox3     Resp:  #Acute Hypoxic Respiratory Failure on BIPAP  -ABG this am 7.49/37/150/28/99%  -BIPAP setting 12/6 FIO2 40%  -Will titrate the BIPAP off as he diureses and try to transition to nasal cannula  -Maintain SPO2 >90    CARDIAC:  #Acute systolic heart failure   #Aortic Stenosis  -ECHO reveals significant Aortic stenosis  -Left ventricular systolic function is mildly to moderately decreased with an ejection fraction visually estimated at 40 to 45%. There are regional wall motion abnormalities present. Hypokinesis of the inferior and inferolateral walls.  The aortic valve anatomy cannot be determined. There iscalcification of the aortic valve leaflets. The peak transaortic velocity is 3.51 m/s, peak transaortic gradient is 49.3 mmHg and mean transaortic gradient is 26.0 mmHg with an LVOT/aortic valve VTI ratio of 0.25. The gross appearance of the aortic valve is consistent with significant aortic stenosis. However, unable to accurately estimate the aortic valve area. There is mild aortic regurgitation.  -Volume Overloaded on Exam  -Will need eventual JEAN once euvolemic and stabilized  -Continue Bumex gtt at 1mg/hr  -On Nitro 50mcg gtt  -Continue hydralazine 25 tid  -Strict intake and output  -Daily weight  -Likely need an ischemic evaluation and TAVER evaluation (?JEAN/ LHC/RHC once optimized)  -IN: 835 mL / OUT: 3765 mL / NET: -2930 mL      #CAD:  -Left heart Cath is 2021  -Findings LM normal, LAD 60%, CX -OM1 with 60%, %   -Discontinued aspirin, (for Eliquis and continue Plavix (USHA)      #NSVT  -Maintain K>4, mag>2    #Paroxysmal Atrial Fibrillation   -CHADSVASC is 7  -Patient does not take Eliquis at home, while in the CCU  -EP consulted, patient reports "all the doctors say I probably have afib"  -Eliquis renally dosed 2.5 bid  -Placed on Eliquis 2.5 bid  -Holding BB for now in setting of heart failure    GI:  -Abdominal distension in setting of SOB and belly breathing  -NPO while on the BIPAP    :  #Acute Kidney Injury superimposed on CKD stage 3  -Baseline Scr ~2  -Renal artery stenosis s/p stent  -Continue Plavix for USHA stent  -Continue diuresis    #Urinary retention:  -Bladder scan revealed >600  -indwelling catheter placed    HEME/ONC:  #Myelodysplastic Syndrome  -History of MDS, last chemo may 20th  -Bone marrow that reported as 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; 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 Gabriel Reddy  -Continue plavix  -Continue gabapentin 300tid    ID:   -Patient takes levofloxacin for neutropenia  -UA, RVP, BC neg  -UCx 10,000 - 49,000 CFU/mL Enterococcus faecalis     DVT PPx:  -On Eliquis initiated    LINES:  LEFT FOREARM PICC LINE FOR CHEMO (came to hospital with the PICC Line and it gets serviced/dressing changed every Friday)

## 2025-06-05 NOTE — PROGRESS NOTE ADULT - PROBLEM SELECTOR PLAN 1
Bumex 1mg/hr  Hydralazine 37.5mg Q8H (hold SBP<100)  Strict I/O  Daily Standing Weights  Monitor Lytes Replete K>4.0andMg>2.0  Trend SCr  TTE revealed severe AS; Plan for TAVR evaluation this admission  R/C this admission   Appreciate Nephrology recommendations   Appreciate Heme recommendations   Management per CCU team  Pending Final Recommendations from HF Attending Bumex 1mg/hr; Please switch to   Please discontinue Nitroglycerin drip   Hydralazine 37.5mg Q8H (hold SBP<100)  Strict I/O  Daily Standing Weights  Monitor Lytes Replete K>4.0andMg>2.0  Trend SCr  TTE revealed severe AS; Plan for TAVR evaluation this admission  R/LHC this admission   Appreciate Nephrology recommendations   Appreciate Heme recommendations   Management per CCU team  Pending Final Recommendations from HF Attending

## 2025-06-05 NOTE — CONSULT NOTE ADULT - SUBJECTIVE AND OBJECTIVE BOX
Incomplete    Patient seen and evaluated at bedside    Chief Complaint:    HPI:  83 Y/O M PMH MDS, HTN, CKD 3, A Fib, Anemia, HTN, Prostate CA, gout, obesity, presents with 3 days of orthopnea, increasing shortness of breath and weight gain. Pt is unsure of how much weight he has gained but states his body feels swollen. Complains of FOX, PND, inability to sleep or lay flat. Is on lasix 40mg daily and has not missed any doses. Admits to increased salty food intake. SOB progressed over the past 3 days now with increased WOB and decided to present for evaluation after his cardiologist recommending eval in the ED. Pt states he has made the same amount of urine as usual over the past 2 days. Pt was hypoxic to 80% with EMS, denies h/o lung issues and denies h/o supplemental o2 use, denies prior h/o bipap use. Pt denies any other sx or acute complaints. In ED, patient was hypoxic requiring O2 with mildly elevated lactate, belly breathing and was trialed on bipap with improvement. Admit for possible new CHF exacerbation    On interview the patient says his breathing has improved with bipap, feeling a bit better. Aside from SOB, FOX, PND, patient denies fever, chills, cough, headache, chest pain, palpitations, sputum production abd pain, n/v/d/c.  (2025 00:46)      PMHx:   HTN - Hypertension    Hypercholesterolemia    PC (prostate cancer)    Gout    Aneurysm, ascending aorta    PSHx:   H/O prostatectomy    H/O carotid endarterectomy    H/O renal artery stenosis    Status post cataract extraction, unspecified laterality    Allergies:  No Known Allergies    Home Meds:    Current Medications:   allopurinol 200 milliGRAM(s) Oral daily  apixaban      apixaban 2.5 milliGRAM(s) Oral two times a day  bumetanide Infusion 1 mG/Hr IV Continuous <Continuous>  chlorhexidine 2% Cloths 1 Application(s) Topical <User Schedule>  clopidogrel Tablet 75 milliGRAM(s) Oral daily  gabapentin 300 milliGRAM(s) Oral two times a day  hydrALAZINE 37.5 milliGRAM(s) Oral three times a day  levoFLOXacin  Tablet 250 milliGRAM(s) Oral every 24 hours  melatonin 3 milliGRAM(s) Oral at bedtime PRN  nitroglycerin  Infusion 10 MICROgram(s)/Min IV Continuous <Continuous>  pantoprazole    Tablet 40 milliGRAM(s) Oral before breakfast  rosuvastatin 20 milliGRAM(s) Oral at bedtime      FAMILY HISTORY:  No pertinent family history in first degree relatives    REVIEW OF SYSTEMS:  Constitutional:     [ ] negative [ ] fevers [ ] chills [ ] weight loss [ ] weight gain  HEENT:                  [ ] negative [ ] dry eyes [ ] eye irritation [ ] postnasal drip [ ] nasal congestion  CV:                         [ ] negative  [ ] chest pain [ ] orthopnea [ ] palpitations [ ] murmur  Resp:                     [ ] negative [ ] cough [ ] shortness of breath [ ] dyspnea [ ] wheezing [ ] sputum [ ]hemoptysis  GI:                          [ ] negative [ ] nausea [ ] vomiting [ ] diarrhea [ ] constipation [ ] abd pain [ ] dysphagia   :                        [ ] negative [ ] dysuria [ ] nocturia [ ] hematuria [ ] increased urinary frequency  Musculoskeletal: [ ] negative [ ] back pain [ ] myalgias [ ] arthralgias [ ] fracture  Skin:                       [ ] negative [ ] rash [ ] itch  Neurological:        [ ] negative [ ] headache [ ] dizziness [ ] syncope [ ] weakness [ ] numbness  Psychiatric:           [ ] negative [ ] anxiety [ ] depression  Endocrine:            [ ] negative [ ] diabetes [ ] thyroid problem  Heme/Lymph:      [ ] negative [ ] anemia [ ] bleeding problem  Allergic/Immune: [ ] negative [ ] itchy eyes [ ] nasal discharge [ ] hives [ ] angioedema    [ ] All other systems negative  [ ] Unable to assess ROS due to      Physical Exam:  T(F): 98.1 (-), Max: 99.5 ()  HR: 91 () (89 - 120)  BP: 118/75 () (107/61 - 137/96)  RR: 18 (-)  SpO2: 93% ()  GENERAL: No acute distress, well-developed  HEAD:  Atraumatic, Normocephalic  ENT: EOMI, PERRLA, conjunctiva and sclera clear, Neck supple, No JVD, moist mucosa  CHEST/LUNG: Clear to auscultation bilaterally; No wheeze, equal breath sounds bilaterally   BACK: No spinal tenderness  HEART: Regular rate and rhythm; No murmurs, rubs, or gallops  ABDOMEN: Soft, Nontender, Nondistended; Bowel sounds present  EXTREMITIES:  No clubbing, cyanosis, or edema  PSYCH: Nl behavior, nl affect  NEUROLOGY: AAOx3, non-focal, cranial nerves intact  SKIN: Normal color, No rashes or lesions  LINES:    Labs: Personally reviewed                        7.9    2.47  )-----------( 53       ( 2025 03:26 )             23.6     06-05    147[H]  |  106  |  42[H]  ----------------------------<  110[H]  3.7   |  25  |  2.76[H]    Ca    8.0[L]      2025 03:26  Phos  4.0     06-05  Mg     2.30     06-05    TPro  6.2  /  Alb  3.6  /  TBili  1.3[H]  /  DBili  x   /  AST  12  /  ALT  6   /  AlkPhos  88  06-05    PT/INR - ( 2025 04:37 )   PT: 14.2 sec;   INR: 1.20 ratio         PTT - ( 2025 04:37 )  PTT:28.1 sec    Total Cholesterol: 91  LDL: --  HDL: 26  T      Thyroid Stimulating Hormone, Serum: 5.62 uIU/mL ( @ 06:20)    Cardiac Imaging    < from: TTE W or WO Ultrasound Enhancing Agent (25 @ 06:31) >    CONCLUSIONS:      1. The left ventricular cavity is normal in size. Left ventricular wall thickness is normal. Left ventricular systolic function is mildly to moderately decreased with an ejection fraction visually estimated at 40 to 45%. There are regional wall motion abnormalities present. Hypokinesis of the inferior and inferolateral walls.   2. Normal right ventricular cavity size and probably normal right ventricular systolic function. Tricuspid annular plane systolic excursion (TAPSE) is 2.0 cm (normal >=1.7 cm).   3. Structurally normal mitral valve with normal leaflet excursion. There is calcification of the mitral valve annulus. There is mild leaflet calcification. There is mild to moderatemitral regurgitation.   4. The aortic valve anatomy cannot be determined. There is calcification of the aortic valve leaflets. The peak transaortic velocity is 3.51 m/s, peak transaortic gradient is 49.3 mmHg and mean transaortic gradient is 26.0 mmHg with an LVOT/aortic valve VTI ratio of 0.25. The gross appearance of the aortic valve is consistent with significant aortic stenosis. However, unable to accurately estimate the aortic valve area. There is mild aortic regurgitation.   5. The left atrium is moderately dilated with an indexed volume of 45.18 ml/m².   6. The tricuspid valve is structurally normal with normal leaflet excursion. There is mild tricuspid regurgitation. Estimated pulmonary artery systolic pressure is 42 mmHg, consistent with mild pulmonary hypertension.   7. Left pleural effusion noted.   8. No prior echocardiogram is available for comparison.    ____________________________________________________________________  Recommendations:  Consider JEAN for further evaluation of the aortic valve, if clinically indicated.     ==========================================================    < from: TTE Limited W or WO Ultrasound Enhancing Agent (12.23.24 @ 09:44) >     CONCLUSIONS:      1. No echocardiographic evidence of vegetations.   2. Moderate mitral regurgitation.   3. Mild aortic regurgitation.   4. Trace pulmonic regurgitation.   5. Trace tricuspid regurgitation.   6. Trileaflet aortic valve with reduced systolic excursion. There is calcification of the aortic valve leaflets.   7. Compared to the transthoracic echocardiogram performed on 2024, there have been no significant interval changes.    =================================================================      Telemetry:     Cardiac Interval History:  Pt seen and examined at bedside, reports SOB is improving, "feels much better today".  Patient reports that he doesn't walk too much a home, uses his walker to ambulate from bed to computer desk, denies significant dyspnea on exertion when he does.  Report SOB at rest has gotten progressive or the past month, worsening over the last several days, prompting him to come to the ED.   He currently denies active CP, palpitations, dizziness, abdominal pain, N/V/D/C, hematochezia, melena, dysuria, hematuria, fever, chills, LE swelling or any other complaints at this time. Reports he lives alone with 24h HHA.     Chief Complaint:    HPI:  85 Y/O M PMH MDS, HTN, CKD 3, A Fib, Anemia, HTN, Prostate CA, gout, obesity, presents with 3 days of orthopnea, increasing shortness of breath and weight gain. Pt is unsure of how much weight he has gained but states his body feels swollen. Complains of FOX, PND, inability to sleep or lay flat. Is on lasix 40mg daily and has not missed any doses. Admits to increased salty food intake. SOB progressed over the past 3 days now with increased WOB and decided to present for evaluation after his cardiologist recommending eval in the ED. Pt states he has made the same amount of urine as usual over the past 2 days. Pt was hypoxic to 80% with EMS, denies h/o lung issues and denies h/o supplemental o2 use, denies prior h/o bipap use. Pt denies any other sx or acute complaints. In ED, patient was hypoxic requiring O2 with mildly elevated lactate, belly breathing and was trialed on bipap with improvement. Admit for possible new CHF exacerbation    On interview the patient says his breathing has improved with bipap, feeling a bit better. Aside from SOB, FOX, PND, patient denies fever, chills, cough, headache, chest pain, palpitations, sputum production abd pain, n/v/d/c.  (2025 00:46)      PMHx:   HTN - Hypertension    Hypercholesterolemia    PC (prostate cancer)    Gout    Aneurysm, ascending aorta    PSHx:   H/O prostatectomy    H/O carotid endarterectomy    H/O renal artery stenosis    Status post cataract extraction, unspecified laterality    Allergies:  No Known Allergies    Home Meds:    Current Medications:   allopurinol 200 milliGRAM(s) Oral daily  apixaban      apixaban 2.5 milliGRAM(s) Oral two times a day  bumetanide Infusion 1 mG/Hr IV Continuous <Continuous>  chlorhexidine 2% Cloths 1 Application(s) Topical <User Schedule>  clopidogrel Tablet 75 milliGRAM(s) Oral daily  gabapentin 300 milliGRAM(s) Oral two times a day  hydrALAZINE 37.5 milliGRAM(s) Oral three times a day  levoFLOXacin  Tablet 250 milliGRAM(s) Oral every 24 hours  melatonin 3 milliGRAM(s) Oral at bedtime PRN  nitroglycerin  Infusion 10 MICROgram(s)/Min IV Continuous <Continuous>  pantoprazole    Tablet 40 milliGRAM(s) Oral before breakfast  rosuvastatin 20 milliGRAM(s) Oral at bedtime      FAMILY HISTORY:  No pertinent family history in first degree relatives    REVIEW OF SYSTEMS:  Constitutional:     [ ] negative [ ] fevers [ ] chills [ ] weight loss [ ] weight gain  HEENT:                  [ ] negative [ ] dry eyes [ ] eye irritation [ ] postnasal drip [ ] nasal congestion  CV:                         [ ] negative  [ ] chest pain [ ] orthopnea [ ] palpitations [ ] murmur  Resp:                     [ ] negative [ ] cough [ ] shortness of breath [ ] dyspnea [ ] wheezing [ ] sputum [ ]hemoptysis  GI:                          [ ] negative [ ] nausea [ ] vomiting [ ] diarrhea [ ] constipation [ ] abd pain [ ] dysphagia   :                        [ ] negative [ ] dysuria [ ] nocturia [ ] hematuria [ ] increased urinary frequency  Musculoskeletal: [ ] negative [ ] back pain [ ] myalgias [ ] arthralgias [ ] fracture  Skin:                       [ ] negative [ ] rash [ ] itch  Neurological:        [ ] negative [ ] headache [ ] dizziness [ ] syncope [ ] weakness [ ] numbness  Psychiatric:           [ ] negative [ ] anxiety [ ] depression  Endocrine:            [ ] negative [ ] diabetes [ ] thyroid problem  Heme/Lymph:      [ ] negative [ ] anemia [ ] bleeding problem  Allergic/Immune: [ ] negative [ ] itchy eyes [ ] nasal discharge [ ] hives [ ] angioedema    [ ] All other systems negative  [ ] Unable to assess ROS due to      Physical Exam:  T(F): 98.1 (-), Max: 99.5 (06-04)  HR: 91 () (89 - 120)  BP: 118/75 () (107/61 - 137/96)  RR: 18 ()  SpO2: 93% ()  GENERAL: No acute distress, well-developed  HEAD:  Atraumatic, Normocephalic  ENT: EOMI, PERRLA, conjunctiva and sclera clear, Neck supple, No JVD, moist mucosa  CHEST/LUNG: Clear to auscultation bilaterally; No wheeze, equal breath sounds bilaterally   BACK: No spinal tenderness  HEART: Regular rate and rhythm; No murmurs, rubs, or gallops  ABDOMEN: Soft, Nontender, Nondistended; Bowel sounds present  EXTREMITIES:  No clubbing, cyanosis, or edema  PSYCH: Nl behavior, nl affect  NEUROLOGY: AAOx3, non-focal, cranial nerves intact  SKIN: Normal color, No rashes or lesions  LINES:    Labs: Personally reviewed                        7.9    2.47  )-----------( 53       ( 2025 03:26 )             23.6     -    147[H]  |  106  |  42[H]  ----------------------------<  110[H]  3.7   |  25  |  2.76[H]    Ca    8.0[L]      2025 03:26  Phos  4.0     -  Mg     2.30         TPro  6.2  /  Alb  3.6  /  TBili  1.3[H]  /  DBili  x   /  AST  12  /  ALT  6   /  AlkPhos  88  -    PT/INR - ( 2025 04:37 )   PT: 14.2 sec;   INR: 1.20 ratio         PTT - ( 2025 04:37 )  PTT:28.1 sec    Total Cholesterol: 91  LDL: --  HDL: 26  T      Thyroid Stimulating Hormone, Serum: 5.62 uIU/mL ( @ 06:20)    Cardiac Imaging    < from: TTE W or WO Ultrasound Enhancing Agent (25 @ 06:31) >    CONCLUSIONS:      1. The left ventricular cavity is normal in size. Left ventricular wall thickness is normal. Left ventricular systolic function is mildly to moderately decreased with an ejection fraction visually estimated at 40 to 45%. There are regional wall motion abnormalities present. Hypokinesis of the inferior and inferolateral walls.   2. Normal right ventricular cavity size and probably normal right ventricular systolic function. Tricuspid annular plane systolic excursion (TAPSE) is 2.0 cm (normal >=1.7 cm).   3. Structurally normal mitral valve with normal leaflet excursion. There is calcification of the mitral valve annulus. There is mild leaflet calcification. There is mild to moderatemitral regurgitation.   4. The aortic valve anatomy cannot be determined. There is calcification of the aortic valve leaflets. The peak transaortic velocity is 3.51 m/s, peak transaortic gradient is 49.3 mmHg and mean transaortic gradient is 26.0 mmHg with an LVOT/aortic valve VTI ratio of 0.25. The gross appearance of the aortic valve is consistent with significant aortic stenosis. However, unable to accurately estimate the aortic valve area. There is mild aortic regurgitation.   5. The left atrium is moderately dilated with an indexed volume of 45.18 ml/m².   6. The tricuspid valve is structurally normal with normal leaflet excursion. There is mild tricuspid regurgitation. Estimated pulmonary artery systolic pressure is 42 mmHg, consistent with mild pulmonary hypertension.   7. Left pleural effusion noted.   8. No prior echocardiogram is available for comparison.    ____________________________________________________________________  Recommendations:  Consider JEAN for further evaluation of the aortic valve, if clinically indicated.     ==========================================================    < from: TTE Limited W or WO Ultrasound Enhancing Agent (24 @ 09:44) >     CONCLUSIONS:      1. No echocardiographic evidence of vegetations.   2. Moderate mitral regurgitation.   3. Mild aortic regurgitation.   4. Trace pulmonic regurgitation.   5. Trace tricuspid regurgitation.   6. Trileaflet aortic valve with reduced systolic excursion. There is calcification of the aortic valve leaflets.   7. Compared to the transthoracic echocardiogram performed on 2024, there have been no significant interval changes.    =================================================================    Telemetry:  Sinus Rhythm

## 2025-06-05 NOTE — PROGRESS NOTE ADULT - ASSESSMENT
83 Y/O M PMH MDS, HTN, CKD 3, A Fib, Anemia, HTN, Prostate CA, gout, obesity, presents with 3 days of orthopnea, increasing shortness of breath and weight gain  CHF   CKD stage 4   Anemia, thrombocytopenia, leukopenia - Pancytopenia   Severe AS     1 Renal- renal fxn slightly higher, on Bumex gtt at 2mg/hr - At this point not sure how much aggressive fluid removal will help--IVC is now NOT dilated  Trend serum creatinine and strict ins and outs   A/w KCl 40 mEq IV x 1;  WHen able allow him to drink   2 CVS- BP stable, on nitro gtt and Hydralazine increased to 37.5mg po tid and not on BBlockers at present  HF following   3 Pulm- On bipap and continue aggressive fluid removal;  Still desats on nasal canula   May need CT of the chest to assess the effusions and see if he would benefit from thoracentesis   4 Heme - s/p PRBC yesterday     Sayed [facility]   [phone]    83 Y/O M PMH MDS, HTN, CKD 3, A Fib, Anemia, HTN, Prostate CA, gout, obesity, presents with 3 days of orthopnea, increasing shortness of breath and weight gain  CHF   CKD stage 4   Anemia, thrombocytopenia, leukopenia - Pancytopenia   Severe AS     1 Renal- renal fxn slightly higher, on Bumex gtt at 2mg/hr - At this point not sure how much aggressive fluid removal will help--IVC is now NOT dilated  Trend serum creatinine and strict ins and outs   A/w KCl 40 mEq IV x 1;  WHen able allow him to drink   2 CVS- BP stable, on nitro gtt and Hydralazine increased to 37.5mg po tid and not on BBlockers at present  HF following   Right and left heart cath likely on Monday   3 Pulm- On bipap and continue aggressive fluid removal;  Still desats on nasal canula   May need CT of the chest to assess the effusions and see if he would benefit from thoracentesis   4 Heme - s/p PRBC yesterday     Sayed [facility]   [phone]

## 2025-06-05 NOTE — CONSULT NOTE ADULT - ASSESSMENT
83 y/o male, PMHx of CAD (LHC 3/18/21 mild LAD 60%, OM1 60%, Prox  ), HFrEF (JEAN 12/26/24 LVEF 35%, trace TR, MR), AFIB (on Eliquis), Renal Artery Stenosis, s/p left renal stent, HTN, Anemia, Prostate Cancer, MDS currently on chemotherapy via PICC line, last dose was in mid April. admitted for decompensated on chronic HFrEF and admitted to CCU for further management .  Subsequent repeat TTE done 6/4/25 showing significant aortic stenosis, possible severe. Interventional cardiology consulted for TAVR evaluation.       83 y/o male, PMHx of CAD (LHC 3/18/21 mild LAD 60%, OM1 60%, Prox  ), HFrEF (JEAN 12/26/24 LVEF 35%, trace TR, MR), AFIB (on Eliquis), Renal Artery Stenosis, s/p left renal stent, HTN, Anemia, Prostate Cancer, MDS currently on chemotherapy via PICC line, last dose was in mid April. admitted for decompensated on chronic HFrEF and admitted to CCU for further management.  Heart Failure team consulted, Overall stage C HF, NYHA class IV-severely hypervolemic and hypoxic requiring BIPAP.  Subsequent repeat TTE done 6/4/25 showing significant aortic stenosis, possible severe. Interventional cardiology consulted for TAVR evaluation.       83 y/o male, PMHx of CAD ([facility] 3/18/21 mild LAD 60%, OM1 60%, Prox  ), HFrEF (JEAN 12/26/24 LVEF 35%, trace TR, MR), AFIB (on Eliquis), Renal Artery Stenosis, s/p left renal stent, HTN, Anemia, Prostate Cancer, MDS currently on chemotherapy via PICC line, last dose was in mid April. admitted for decompensated on chronic HFrEF and admitted to CCU for further management.  Heart Failure team consulted, Overall stage C HF, NYHA class IV-severely hypervolemic and hypoxic requiring BIPAP.  Subsequent repeat TTE done 6/4/25 showing significant aortic stenosis, possible severe. Interventional cardiology consulted for TAVR evaluation.    # Aortic Stenosis  # Acute systolic heart failure     - appears volume overloaded on exam. On supplemental O2, 4L NC  - TTE 6/4:  EF 40-45%, RWMA present.  Hypokinesis of the inferior and inferolateral walls.  Moderate MR. pVel 3.51, pGrad 49.3, mGrad 26.0, LVOT/aortic valve VTI ratio of 0.25.  The gross appearance of the aortic valve is consistent with significant aortic stenosis. However, unable to accurately estimate the aortic valve area.  Mild to moderate AR.   - Interventional cards consulted.  Discussed with patient, details and indication for further TAVR evaluation with Right/Left Heart Cath, JEAN and CTA TAVR, to which he is amendable   - continue Bumex gtt 1mg/h   - continue Nitro 50mcg gtt  - continue Hydralazine 37.5mg TID  - HF following, recs appreciated  - monitor electrolytes, replete to keep K > 4, Mg > 2  - Strict I/Os. Daily weights, fluid restriction  - case discussed with interventionalist, Dr Mendoza, will monitor Cr over the weekend and possible R/[facility] Monday if Cr stabilizes and Hgb/Plts improves    # CAD  # USHA  - s/p Dx [facility]  2021:  LM normal, LAD 60%, OM1 60%, %   - s/p L Renal stent on DAPT  - continue Rosuvastatin 20mg daily at bedtime    # JUAN RAMON on CKD  - CKD stage 3, baseline Cr ~ 2  - Nephro following, recs appreciated  - Cr 2.75 (2.54 on 6/4).  Trend BUN/Cr daily. Avoid nephrotoxic agents  - continue Bumex gtt as prescribed  - h/o Renal artery stent, on DAPT  - Further plan per Nephro    # Paroxysmal Atrial Fibrillation   - noted NSR, HR 90s  - EP consulted, recs appreciated  - continue Eliquis 2.5mg BID (renally dosed)  - BB held iso heart failure exacerbation    # Anemia  # Myelodysplastic Syndrome (MDS)  - History of MDS, last chemo dose 5/20/25   - Follows with outpatient HemeOnc  - H/H (7.9/23.6), Plts 53  - On Levofloxacin for neutropenia  - Further plan per primary team               83 y/o male, PMHx of CAD ([facility] 3/18/21 mild LAD 60%, OM1 60%, Prox  ), HFrEF (JEAN 12/26/24 LVEF 35%, trace TR, MR), AFIB (on Eliquis), Renal Artery Stenosis, s/p left renal stent, HTN, Anemia, Prostate Cancer, MDS currently on chemotherapy via PICC line, last dose was in mid April. admitted for decompensated on chronic HFrEF and admitted to CCU for further management.  Heart Failure team consulted, Overall stage C HF, NYHA class IV-severely hypervolemic and hypoxic requiring BIPAP.  Subsequent repeat TTE done 6/4/25 showing significant aortic stenosis, possible severe. Interventional cardiology consulted for TAVR evaluation.    # Aortic Stenosis  # Acute systolic heart failure     - appears volume overloaded on exam. On supplemental O2, 4L NC  - TTE 6/4:  EF 40-45%, RWMA present.  Hypokinesis of the inferior and inferolateral walls.  Moderate MR. pVel 3.51, pGrad 49.3, mGrad 26.0, LVOT/aortic valve VTI ratio of 0.25.  The gross appearance of the aortic valve is consistent with significant aortic stenosis. However, unable to accurately estimate the aortic valve area.  Mild to moderate AR.   - Interventional cards consulted.  Discussed with patient, details and indication for further TAVR evaluation with Right/Left Heart Cath, JEAN and CTA TAVR, to which he is amendable   - continue Bumex gtt 1mg/h   - continue Nitro 50mcg gtt  - continue Hydralazine 37.5mg TID  - HF following, recs appreciated  - monitor electrolytes, replete to keep K > 4, Mg > 2  - Strict I/Os. Daily weights, fluid restriction  - case discussed with interventionalist, Dr Mendoza, will monitor Cr/CBC over the weekend and possible R/[facility] Monday if Cr stabilizes and Hgb/Plts improves    # CAD  # USHA  - s/p Dx [facility]  2021:  LM normal, LAD 60%, OM1 60%, %   - s/p L Renal stent on DAPT  - continue Rosuvastatin 20mg daily at bedtime    # JUAN RAMON on CKD  - CKD stage 3, baseline Cr ~ 2  - Nephro following, recs appreciated  - Cr 2.75 (2.54 on 6/4).  Trend BUN/Cr daily. Avoid nephrotoxic agents  - continue Bumex gtt as prescribed  - h/o Renal artery stent, on DAPT  - Further plan per Nephro    # Paroxysmal Atrial Fibrillation   - noted NSR, HR 90s  - EP consulted, recs appreciated  - continue Eliquis 2.5mg BID (renally dosed)  - BB held iso heart failure exacerbation    # Anemia  # Myelodysplastic Syndrome (MDS)  - History of MDS, last chemo dose 5/20/25   - Follows with outpatient HemeOnc  - H/H (7.9/23.6), Plts 53  - On Levofloxacin for neutropenia  - Further plan per primary team

## 2025-06-05 NOTE — PHYSICAL THERAPY INITIAL EVALUATION ADULT - ACTIVE RANGE OF MOTION EXAMINATION, REHAB EVAL
both hip and knee extension and flexion 10-90, ankle WFL/bilateral upper extremity Active ROM was WFL (within functional limits)

## 2025-06-05 NOTE — PROGRESS NOTE ADULT - ASSESSMENT
83 y/o male with PMHX of CAD (LHC 3/18/21 mild LAD 60%, OM1 60%, Prox  ), HFrEF (JEAN 12/26/24 LVEF 35%, trace TR, MR), afib, anemia, renal artery stenosis s/p L renal stent, HTN, prstate CA/ MDS currently on chemotherapy via PICC line, last dose was in mid April.  Pt comes in with complaints of SOB and weight gain. He was found to be hypoxic in ED with O2 sat 80s, was placed on BIPAP. HF consult called on 6/3/25 to help manage care in this patient who is suspected to be in ADHF. Labs show proBNP 6482, lactate 2.5 now improved to 1.5, BUN/Cr 41/2.55, H/H 8.3/25, trop 171/166. CXR shows b/l pleural effusions. Pt still on BIPAP, pt's aide helped answer questions. Removing BIPAP sat dropped to 71%. Pt lives alone with live in aide. He admits to eating very salty meals- frozen dinners frequently. He is compliant with taking all HF medications, given to him by his aide. Overall stage C HF, NYHA class IV-severely hypervolemic and hypoxic requiring BIPAP.

## 2025-06-05 NOTE — PHYSICAL THERAPY INITIAL EVALUATION ADULT - ADDITIONAL COMMENTS
Patient report lives alone house, 5 steps to enter, ambulated short distance with rollator and assistance form his home health.

## 2025-06-05 NOTE — PROGRESS NOTE ADULT - SUBJECTIVE AND OBJECTIVE BOX
NEPHROLOGY     Patient seen and examined reports still with mild sob, remains on bipap, noted placed on 4L NC earlier and was hypoxic, on bumex and nitro gtts,  denies pain, in no acute distress.     MEDICATIONS  (STANDING):  allopurinol 200 milliGRAM(s) Oral daily  apixaban      apixaban 2.5 milliGRAM(s) Oral two times a day  bumetanide Infusion 2 mG/Hr (10 mL/Hr) IV Continuous <Continuous>  chlorhexidine 2% Cloths 1 Application(s) Topical <User Schedule>  clopidogrel Tablet 75 milliGRAM(s) Oral daily  gabapentin 300 milliGRAM(s) Oral two times a day  hydrALAZINE 37.5 milliGRAM(s) Oral three times a day  levoFLOXacin  Tablet 250 milliGRAM(s) Oral every 24 hours  nitroglycerin  Infusion 10 MICROgram(s)/Min (3 mL/Hr) IV Continuous <Continuous>  pantoprazole    Tablet 40 milliGRAM(s) Oral before breakfast  rosuvastatin 10 milliGRAM(s) Oral at bedtime    VITALS:  T(C): , Max: 37.5 (06-04-25 @ 20:00)  T(F): , Max: 99.5 (06-04-25 @ 20:00)  HR: 94 (06-05-25 @ 09:00)  BP: 121/76 (06-05-25 @ 09:00)  BP(mean): 89 (06-05-25 @ 09:00)  RR: 19 (06-05-25 @ 09:00)  SpO2: 97% (06-05-25 @ 09:00)    I and O's:    06-04 @ 07:01  -  06-05 @ 07:00  --------------------------------------------------------  IN: 835 mL / OUT: 3765 mL / NET: -2930 mL    PHYSICAL EXAM:  Constitutional: NAD  HEENT: EOMI  Neck:  No JVD, supple   Respiratory: diminished   Cardiovascular: tachy  Gastrointestinal: + BS, soft, NT, ND  Extremities: + peripheral edema, + peripheral pulses  Neurological: A/O x 3, CN2-12 intact  Psychiatric: Normal mood, normal affect  : + Min  Skin: No rashes, C/D/I.    LABS:                        7.9    2.47  )-----------( 53       ( 05 Jun 2025 03:26 )             23.6     06-05    147[H]  |  106  |  42[H]  ----------------------------<  110[H]  3.7   |  25  |  2.76[H]    Ca    8.0[L]      05 Jun 2025 03:26  Phos  4.0     06-05  Mg     2.30     06-05    TPro  6.2  /  Alb  3.6  /  TBili  1.3[H]  /  DBili  x   /  AST  12  /  ALT  6   /  AlkPhos  88  06-05    RADIOLOGY & ADDITIONAL STUDIES:  rad< from: Xray Chest 1 View- PORTABLE-Routine (Xray Chest 1 View- PORTABLE-Routine in AM.) (06.04.25 @ 04:38) >      INTERPRETATION:  CLINICAL INFORMATION: CHF    TIME OF EXAMINATION: June 4 at 4:29 AM    EXAM: Portable chest    FINDINGS:    Right-sided effusions moderate on the right small on the left without   interval change.  No focal consolidations.  Heart size is stable and not obviously enlarged.  No pneumothorax.  Mild vascular congestion.    COMPARISON: [facility]    IMPRESSION: Follow-up bilateral effusions likely relating to CHF.    --- End of Report ---    MOUNA BLANCO MD; Attending Radiologist  This document has been electronically signed. Jun 4 2025 11:14AM    < end of copied text >        83 Y/O M PMH MDS, HTN, CKD 3, A Fib, Anemia, HTN, Prostate CA, gout, obesity, presents with 3 days of orthopnea, increasing shortness of breath and weight gain  CHF   CKD stage 4   Bl effusion   Anemia, thrombocytopenia, leukopenia   Hypernatremia     1 Renal- renal fxn slightly higher, on Bumex gtt at 2mg/hr   Trend serum creatinine and strict ins and outs   A/w KCl 40 mEq IV x 1   2 CVS- BP stable, on nitro gtt and Hydralazine increased to 37.5mg po tid and not on BBlockers at present  Repeat echo noted  HF following   3 Pulm- On bipap and continue aggressive fluid removal   4 Heme - s/p PRBC yesterday      NEPHROLOGY     Patient seen and examined reports still with mild sob, remains on bipap, noted placed on 4L NC earlier and was hypoxic, on bumex and nitro gtts,  denies pain, in no acute distress.     MEDICATIONS  (STANDING):  allopurinol 200 milliGRAM(s) Oral daily  apixaban      apixaban 2.5 milliGRAM(s) Oral two times a day  bumetanide Infusion 2 mG/Hr (10 mL/Hr) IV Continuous <Continuous>  chlorhexidine 2% Cloths 1 Application(s) Topical <User Schedule>  clopidogrel Tablet 75 milliGRAM(s) Oral daily  gabapentin 300 milliGRAM(s) Oral two times a day  hydrALAZINE 37.5 milliGRAM(s) Oral three times a day  levoFLOXacin  Tablet 250 milliGRAM(s) Oral every 24 hours  nitroglycerin  Infusion 10 MICROgram(s)/Min (3 mL/Hr) IV Continuous <Continuous>  pantoprazole    Tablet 40 milliGRAM(s) Oral before breakfast  rosuvastatin 10 milliGRAM(s) Oral at bedtime    VITALS:  T(C): , Max: 37.5 (06-04-25 @ 20:00)  T(F): , Max: 99.5 (06-04-25 @ 20:00)  HR: 94 (06-05-25 @ 09:00)  BP: 121/76 (06-05-25 @ 09:00)  BP(mean): 89 (06-05-25 @ 09:00)  RR: 19 (06-05-25 @ 09:00)  SpO2: 97% (06-05-25 @ 09:00)    I and O's:    06-04 @ 07:01  -  06-05 @ 07:00  --------------------------------------------------------  IN: 835 mL / OUT: 3765 mL / NET: -2930 mL    PHYSICAL EXAM:  Constitutional: NAD  HEENT: EOMI  Neck:  No JVD, supple   Respiratory: diminished   Cardiovascular: tachy  Gastrointestinal: + BS, soft, NT, ND  Extremities: + peripheral edema, + peripheral pulses  Neurological: A/O x 3, CN2-12 intact  Psychiatric: Normal mood, normal affect  : + Min  Skin: No rashes, C/D/I.    LABS:                        7.9    2.47  )-----------( 53       ( 05 Jun 2025 03:26 )             23.6     06-05    147[H]  |  106  |  42[H]  ----------------------------<  110[H]  3.7   |  25  |  2.76[H]    Ca    8.0[L]      05 Jun 2025 03:26  Phos  4.0     06-05  Mg     2.30     06-05    TPro  6.2  /  Alb  3.6  /  TBili  1.3[H]  /  DBili  x   /  AST  12  /  ALT  6   /  AlkPhos  88  06-05    RADIOLOGY & ADDITIONAL STUDIES:  rad< from: Xray Chest 1 View- PORTABLE-Routine (Xray Chest 1 View- PORTABLE-Routine in AM.) (06.04.25 @ 04:38) >      INTERPRETATION:  CLINICAL INFORMATION: CHF    TIME OF EXAMINATION: June 4 at 4:29 AM    EXAM: Portable chest    FINDINGS:    Right-sided effusions moderate on the right small on the left without   interval change.  No focal consolidations.  Heart size is stable and not obviously enlarged.  No pneumothorax.  Mild vascular congestion.    COMPARISON: [facility]    IMPRESSION: Follow-up bilateral effusions likely relating to CHF.    --- End of Report ---    MOUNA BLANCO MD; Attending Radiologist  This document has been electronically signed. Jun 4 2025 11:14AM    < end of copied text >      < from: TTE W or WO Ultrasound Enhancing Agent (06.04.25 @ 06:31) >    TRANSTHORACIC ECHOCARDIOGRAM REPORT  ________________________________________________________________________________                                      _______       Pt. Name:       MASOOD MACARIO Study Date:    6/4/2025  MRN:            [id number]         YOB: 1940  Accession #:    [id number]         Age:           84 years  Account#:       [id number]          Gender:        M  Heart Rate:                       Height:        66.00 in (167.64 cm)  Rhythm:    Weight:        214.00 lb (97.07 kg)  Blood Pressure: 128/84 mmHg       BSA/BMI:       2.06 m² / 34.54 kg/m²  ________________________________________________________________________________________  Referring Physician:    [id number] Grupo Church  Interpreting Physician: Jhonatan Escobar M.D.  Primary Sonographer:    Brittney Harvey RDCS    CPT:               ECHO TTE WO CON COMP W DOPP - 21524.m  Indication(s):     Cardiomyopathy, unspecified - I42.9  Procedure:         Transthoracic echocardiogramwith 2-D, M-mode and complete                     spectral and color flow Doppler.  Ordering Location: [facility]  Admission Status:  Inpatient  Study Information: Image quality for this study is adequate.    _______________________________________________________________________________________     CONCLUSIONS:      1. The left ventricular cavity is normal in size. Left ventricular wall thickness is normal. Left ventricular systolic function is mildly to moderately decreased with an ejection fraction visually estimated at 40 to 45%. There are regional wall motion abnormalities present. Hypokinesis of the inferior and inferolateral walls.   2. Normal right ventricular cavity size and probably normal right ventricular systolic function. Tricuspid annular plane systolic excursion (TAPSE) is 2.0 cm (normal >=1.7 cm).   3. Structurally normal mitral valve with normal leaflet excursion. There is calcification of the mitral valve annulus. There is mild leaflet calcification. There is mild to moderatemitral regurgitation.   4. The aortic valve anatomy cannot be determined. There is calcification of the aortic valve leaflets. The peak transaortic velocity is 3.51 m/s, peak transaortic gradient is 49.3 mmHg and mean transaortic gradient is 26.0 mmHg with an LVOT/aortic valve VTI ratio of 0.25. The gross appearance of the aortic valve is consistent with significant aortic stenosis. However, unable to accurately estimate the aortic valve area. There is mild aortic regurgitation.   5. The left atrium is moderately dilated with an indexed volume of 45.18 ml/m².   6. The tricuspid valve is structurally normal with normal leaflet excursion. There is mild tricuspid regurgitation. Estimated pulmonary artery systolic pressure is 42 mmHg, consistent with mild pulmonary hypertension.   7. Left pleural effusion noted.   8. No prior echocardiogram is available for comparison.    ____________________________________________________________________  Recommendations:  Consider JEAN for further evaluation of the aortic valve, if clinically indicated.     ________________________________________________________________________________________  FINDINGS:     Left Ventricle:  The left ventricular cavity is normal in size. Left ventricular wall thicknessis normal. Left ventricular systolic function is mildly to moderately decreased with an ejection fraction visually estimated at 40 to 45%. There are regional wall motion abnormalities present. Hypokinesis of the inferior and inferolateral walls.     Right Ventricle:  The right ventricular cavity is normal in size and right ventricular systolic function is probably normal. Tricuspid annular plane systolic excursion (TAPSE) is 2.0 cm (normal >=1.7 cm).     Left Atrium:  The left atrium is moderately dilated with an indexed volume of 45.18 ml/m².     Right Atrium:  The right atrium is normal in size with an indexed volume of 19.92 ml/m² and an indexed area of 8.16 cm²/m².     Aortic Valve:  The aortic valve anatomy cannot be determined. There iscalcification of the aortic valve leaflets. The peak transaortic velocity is 3.51 m/s, peak transaortic gradient is 49.3 mmHg and mean transaortic gradient is 26.0 mmHg with an LVOT/aortic valve VTI ratio of 0.25. The gross appearance of the aortic valve is consistent with significant aortic stenosis. However, unable to accurately estimate the aortic valve area. There is mild aortic regurgitation.     Mitral Valve:  Structurally normal mitral valve with normal leaflet excursion. There is calcification of the mitral valve annulus. There is mild leaflet calcification. There is mild to moderate mitral regurgitation.     Tricuspid Valve:  The tricuspid valve is structurally normal with normal leaflet excursion. There is mild tricuspid regurgitation. Estimated pulmonary artery systolic pressure is 42 mmHg, consistent with mild pulmonary hypertension.     Pulmonic Valve:  Structurally normal pulmonic valve with normal leaflet excursion. There is trace pulmonic regurgitation.     Aorta:  The aortic arch is not well visualized. The aortic root at the sinuses of Valsalva is normal in size, measuring 3.00 cm (indexed 1.46 cm/m²). The ascending aorta is normal in size.     Pericardium:  No pericardial effusion seen.     Pleura:  Left pleural effusion noted.     Systemic Veins:  The inferior vena cava is normal in size measuring 2.10 cm in diameter, (normal <2.1cm) with normal inspiratory collapse (normal >50%) consistent with normal right atrial pressure (~3, range 0-5mmHg).  ____________________________________________________________________  QUANTITATIVE DATA:  Left Ventricle Measurements: (Indexed to BSA)     IVSd (2D):   0.9 cm  LVPWd (2D):  0.9 cm  LVIDd (2D):  5.5 cm  LVIDs (2D):  4.7 cm  LV Mass:     183 g  88.9 g/m²  Visualized LV EF%: 40 to 45%     MV E Vmax:    1.37 m/s  MV A Vmax:    1.06 m/s  MV E/A:       1.29  e' lateral:   7.51 cm/s  e' medial:    7.29 cm/s  E/e' lateral: 18.24  E/e' medial:  18.79  E/e' Average: 18.51  MV DT:        118 msec    Aorta Measurements: (Normal range) (Indexed to BSA)     Ao Root d     3.00 cm (3.1 - 3.7 cm) 1.46 cm/m²  Ao Asc d, 2D: 2.50  Ao Asc prox:  2.50 cm                1.21 cm/m²            Left Atrium Measurements: (Indexed to BSA)  LA Diam 2D: 4.40 cm         Right Ventricle Measurements: Right Atrial Measurements:     TAPSE:            2.0 cm      RA Vol s, MOD A4C         41.0 ml  RV S' Vmax:       11.90 cm/s  RA Vol s, MOD A4C i BSA   19.92 ml/m²  RV Base (RVID1):  3.0 cm      RA Area s, MOD A4C        16.8 cm²  RV Mid (RVID2):   2.6 cm      RA Area s, MOD A4C, i BSA 8.16 cm²/m²  RV Major (RVID3): 5.1 cm       LVOT / RVOT/ Qp/Qs Data: (Indexed to BSA)  LVOT Vmax:      0.87 m/s  LVOT Vmn:       0.590 m/s  LVOT VTI:       18.90 cm  LVOT peak grad: 3 mmHg  LVOT mean grad: 1.5 mmHg    Aortic Valve Measurements:  AV Vmax:                3.5 m/s  AV Peak Gradient:       49.3 mmHg  AV Mean Gradient:       26.0 mmHg  AV VTI:                 75.3 cm  AV VTI Ratio:           0.25  AoV Dimensionless Index 0.25    Mitral Valve Measurements:     MV E Vmax: 1.4 m/s  MV A Vmax: 1.1 m/s  MV E/A:    1.3       Tricuspid Valve Measurements:     TR Vmax:          3.1 m/s  TR Peak Gradient: 38.7 mmHg  RA Pressure:      3 mmHg  PASP:             42 mmHg    ________________________________________________________________________________________  Electronically signed on 6/4/2025 at 8:06:44 AM by Jhonatan Escobar M.D.         *** Final ***    < end of copied text >

## 2025-06-05 NOTE — PROGRESS NOTE ADULT - CRITICAL CARE ATTENDING COMMENT
A/w acute hypoxemic resp failure requiring BiPAP 100%, with O2 desat to 70% off O2.  Now in CCU on Bumex and NTG drips.  Good diuresis. Pleural effusions have decreased. O2 requirement less. Now on n.c.  Wean off NTG gtt.  Likely metabolic alkalosis. SCr up to ~2.8.  Decrease Bumex gtt.  Echo shows likely severe aortic stenosis. Will need structural heart eval once clinically stabilized.

## 2025-06-05 NOTE — PHYSICAL THERAPY INITIAL EVALUATION ADULT - PERTINENT HX OF CURRENT PROBLEM, REHAB EVAL
Patient is 84 year old male with PMHx of CAD ([facility] 3/18/21 mild LAD 60%, OM1 60%, Prox  ), HFrEF (JEAN 12/26/24 LVEF 35%, trace TR, MR), afib, anemia, renal artery stenosis s/p L renal stent, HTN, prostate CA/ MDS currently on chemotherapy via PICC line, last dose was in mid April.  Pt comes in with complaints of SOB and weight gain.

## 2025-06-05 NOTE — CONSULT NOTE ADULT - CONSULT REASON
Acute Decompensated Heart Failure
Severe Aortic Stenosis
ADHF
CKD stage 4
atrial tachycardia
New Bipap ISO respiratory distress

## 2025-06-05 NOTE — PROGRESS NOTE ADULT - SUBJECTIVE AND OBJECTIVE BOX
Interval History:  Patient resting comfortably in bed   Denies CP/SOB/palpitations/dizziness.  No acute events overnight.      Medications:  allopurinol 200 milliGRAM(s) Oral daily  apixaban      apixaban 2.5 milliGRAM(s) Oral two times a day  bumetanide Infusion 1 mG/Hr IV Continuous <Continuous>  chlorhexidine 2% Cloths 1 Application(s) Topical <User Schedule>  clopidogrel Tablet 75 milliGRAM(s) Oral daily  gabapentin 300 milliGRAM(s) Oral two times a day  hydrALAZINE 37.5 milliGRAM(s) Oral three times a day  levoFLOXacin  Tablet 250 milliGRAM(s) Oral every 24 hours  melatonin 3 milliGRAM(s) Oral at bedtime PRN  nitroglycerin  Infusion 10 MICROgram(s)/Min IV Continuous <Continuous>  pantoprazole    Tablet 40 milliGRAM(s) Oral before breakfast  rosuvastatin 20 milliGRAM(s) Oral at bedtime      Vitals:  T(C): 36.7 (25 @ 08:00), Max: 37.5 (25 @ 20:00)  HR: 106 (25 @ 10:00) (87 - 120)  BP: 111/79 (25 @ 10:00) (104/72 - 137/96)  BP(mean): 89 (25 @ 10:00) (70 - 107)  RR: 18 (25 @ 10:00) (13 - 23)  SpO2: 100% (25 @ 10:00) (88% - 109%)    Daily     Daily Weight in k (2025 04:00)        I&O's Summary    2025 07:01  -  2025 07:00  --------------------------------------------------------  IN: 835 mL / OUT: 3765 mL / NET: -2930 mL    2025 07:  -  2025 11:54  --------------------------------------------------------  IN: 89 mL / OUT: 750 mL / NET: -661 mL        Physical Exam:  Appearance: No Acute Distress  Neck: JVP  Cardiovascular: Normal S1 S2  Respiratory: Clear to auscultation bilaterally  Gastrointestinal: Soft, Non-tender	  Skin: No cyanosis	  Neurologic: Non-focal  Extremities: BLE edema      Labs:                        7.9    2.47  )-----------( 53       ( 2025 03:26 )             23.6     06-05    147[H]  |  106  |  42[H]  ----------------------------<  110[H]  3.7   |  25  |  2.76[H]    Ca    8.0[L]      2025 03:26  Phos  4.0     06-05  Mg     2.30     06-05    TPro  6.2  /  Alb  3.6  /  TBili  1.3[H]  /  DBili  x   /  AST  12  /  ALT  6   /  AlkPhos  88  06-05    PT/INR - ( 2025 04:37 )   PT: 14.2 sec;   INR: 1.20 ratio         PTT - ( 2025 04:37 )  PTT:28.1 sec        TELEMETRY:    Echocardiogram:  < from: TTE W or WO Ultrasound Enhancing Agent (25 @ 06:31) >     CONCLUSIONS:      1. The left ventricular cavity is normal in size. Left ventricular wall thickness is normal. Left ventricular systolic function is mildly to moderately decreased with an ejection fraction visually estimated at 40 to 45%. There are regional wall motion abnormalities present. Hypokinesis of the inferior and inferolateral walls.   2. Normal right ventricular cavity size and probably normal right ventricular systolic function. Tricuspid annular plane systolic excursion (TAPSE) is 2.0 cm (normal >=1.7 cm).   3. Structurally normal mitral valve with normal leaflet excursion. There is calcification of the mitral valve annulus. There is mild leaflet calcification. There is mild to moderatemitral regurgitation.   4. The aortic valve anatomy cannot be determined. There is calcification of the aortic valve leaflets. The peak transaortic velocity is 3.51 m/s, peak transaortic gradient is 49.3 mmHg and mean transaortic gradient is 26.0 mmHg with an LVOT/aortic valve VTI ratio of 0.25. The gross appearance of the aortic valve is consistent with significant aortic stenosis. However, unable to accurately estimate the aortic valve area. There is mild aortic regurgitation.   5. The left atrium is moderately dilated with an indexed volume of 45.18 ml/m².   6. The tricuspid valve is structurally normal with normal leaflet excursion. There is mild tricuspid regurgitation. Estimated pulmonary artery systolic pressure is 42 mmHg, consistent with mild pulmonary hypertension.   7. Left pleural effusion noted.   8. No prior echocardiogram is available for comparison.    ____________________________________________________________________  Recommendations:  Consider JEAN for further evaluation of the aortic valve, if clinically indicated.     ________________________________________________________________________________________  FINDINGS:     Left Ventricle:  The left ventricular cavity is normal in size. Left ventricular wall thicknessis normal. Left ventricular systolic function is mildly to moderately decreased with an ejection fraction visually estimated at 40 to 45%. There are regional wall motion abnormalities present. Hypokinesis of the inferior and inferolateral walls.     Right Ventricle:  The right ventricular cavity is normal in size and right ventricular systolic function is probably normal. Tricuspid annular plane systolic excursion (TAPSE) is 2.0 cm (normal >=1.7 cm).     Left Atrium:  The left atrium is moderately dilated with an indexed volume of 45.18 ml/m².     Right Atrium:  The right atrium is normal in size with an indexed volume of 19.92 ml/m² and an indexed area of 8.16 cm²/m².     Aortic Valve:  The aortic valve anatomy cannot be determined. There iscalcification of the aortic valve leaflets. The peak transaortic velocity is 3.51 m/s, peak transaortic gradient is 49.3 mmHg and mean transaortic gradient is 26.0 mmHg with an LVOT/aortic valve VTI ratio of 0.25. The gross appearance of the aortic valve is consistent with significant aortic stenosis. However, unable to accurately estimate the aortic valve area. There is mild aortic regurgitation.     Mitral Valve:  Structurally normal mitral valve with normal leaflet excursion. There is calcification of the mitral valve annulus. There is mild leaflet calcification. There is mild to moderate mitral regurgitation.     Tricuspid Valve:  The tricuspid valve is structurally normal with normal leaflet excursion. There is mild tricuspid regurgitation. Estimated pulmonary artery systolic pressure is 42 mmHg, consistent with mild pulmonary hypertension.     Pulmonic Valve:  Structurally normal pulmonic valve with normal leaflet excursion. There is trace pulmonic regurgitation.     Aorta:  The aortic arch is not well visualized. The aortic root at the sinuses of Valsalva is normal in size, measuring 3.00 cm (indexed 1.46 cm/m²). The ascending aorta is normal in size.     Pericardium:  No pericardial effusion seen.     Pleura:  Left pleural effusion noted.     Systemic Veins:  The inferior vena cava is normal in size measuring 2.10 cm in diameter, (normal <2.1cm) with normal inspiratory collapse (normal >50%) consistent with normal right atrial pressure (~3, range 0-5mmHg).  ____________________________________________________________________  QUANTITATIVE DATA:  Left Ventricle Measurements: (Indexed to BSA)     IVSd (2D):   0.9 cm  LVPWd (2D):  0.9 cm  LVIDd (2D):  5.5 cm  LVIDs (2D):  4.7 cm  LV Mass:     183 g  88.9 g/m²  Visualized LV EF%: 40 to 45%     MV E Vmax:    1.37 m/s  MV A Vmax:    1.06 m/s  MV E/A:       1.29  e' lateral:   7.51 cm/s  e' medial:    7.29 cm/s  E/e' lateral: 18.24  E/e' medial:  18.79  E/e' Average: 18.51  MV DT:        118 msec    Aorta Measurements: (Normal range) (Indexed to BSA)     Ao Root d     3.00 cm (3.1 - 3.7 cm) 1.46 cm/m²  Ao Asc d, 2D: 2.50  Ao Asc prox:  2.50 cm                1.21 cm/m²            Left Atrium Measurements: (Indexed to BSA)  LA Diam 2D: 4.40 cm         Right Ventricle Measurements: Right Atrial Measurements:     TAPSE:            2.0 cm      RA Vol s, MOD A4C         41.0 ml  RV S' Vmax:       11.90 cm/s  RA Vol s, MOD A4C i BSA   19.92 ml/m²  RV Base (RVID1):  3.0 cm      RA Area s, MOD A4C        16.8 cm²  RV Mid (RVID2):   2.6 cm      RA Area s, MOD A4C, i BSA 8.16 cm²/m²  RV Major (RVID3): 5.1 cm       LVOT / RVOT/ Qp/Qs Data: (Indexed to BSA)  LVOT Vmax:      0.87 m/s  LVOT Vmn:       0.590 m/s  LVOT VTI:       18.90 cm  LVOT peak grad: 3 mmHg  LVOT mean grad: 1.5 mmHg    Aortic Valve Measurements:  AV Vmax:                3.5 m/s  AV Peak Gradient:       49.3 mmHg  AV Mean Gradient:       26.0 mmHg  AV VTI:                 75.3 cm  AV VTI Ratio:           0.25  AoV Dimensionless Index 0.25    Mitral Valve Measurements:     MV E Vmax: 1.4 m/s  MV A Vmax: 1.1 m/s  MV E/A:    1.3       Tricuspid Valve Measurements:     TR Vmax:          3.1 m/s  TR Peak Gradient: 38.7 mmHg  RA Pressure:      3 mmHg  PASP:             42 mmHg         Interval History:  Patient resting comfortably in bed   Denies CP/SOB/palpitations/dizziness.  No acute events overnight.      Medications:  allopurinol 200 milliGRAM(s) Oral daily  apixaban      apixaban 2.5 milliGRAM(s) Oral two times a day  bumetanide Infusion 1 mG/Hr IV Continuous <Continuous>  chlorhexidine 2% Cloths 1 Application(s) Topical <User Schedule>  clopidogrel Tablet 75 milliGRAM(s) Oral daily  gabapentin 300 milliGRAM(s) Oral two times a day  hydrALAZINE 37.5 milliGRAM(s) Oral three times a day  levoFLOXacin  Tablet 250 milliGRAM(s) Oral every 24 hours  melatonin 3 milliGRAM(s) Oral at bedtime PRN  nitroglycerin  Infusion 10 MICROgram(s)/Min IV Continuous <Continuous>  pantoprazole    Tablet 40 milliGRAM(s) Oral before breakfast  rosuvastatin 20 milliGRAM(s) Oral at bedtime      Vitals:  T(C): 36.7 (25 @ 08:00), Max: 37.5 (25 @ 20:00)  HR: 106 (25 @ 10:00) (87 - 120)  BP: 111/79 (25 @ 10:00) (104/72 - 137/96)  BP(mean): 89 (25 @ 10:00) (70 - 107)  RR: 18 (25 @ 10:00) (13 - 23)  SpO2: 100% (25 @ 10:00) (88% - 109%)    Daily     Daily Weight in k (2025 04:00)        I&O's Summary    2025 07:01  -  2025 07:00  --------------------------------------------------------  IN: 835 mL / OUT: 3765 mL / NET: -2930 mL    2025 07:  -  2025 11:54  --------------------------------------------------------  IN: 89 mL / OUT: 750 mL / NET: -661 mL        Physical Exam:  Appearance: No Acute Distress  Neck: JVP~6  Cardiovascular: Normal S1 S2  Respiratory: Clear to auscultation diminished  bilateral bases L>R  Gastrointestinal: Soft, Non-tender	  Skin: No cyanosis	  Neurologic: Non-focal  Extremities: No BLE edema; warm to touch      Labs:                        7.9    2.47  )-----------( 53       ( 2025 03:26 )             23.6     06-05    147[H]  |  106  |  42[H]  ----------------------------<  110[H]  3.7   |  25  |  2.76[H]    Ca    8.0[L]      2025 03:26  Phos  4.0     06-05  Mg     2.30     06-05    TPro  6.2  /  Alb  3.6  /  TBili  1.3[H]  /  DBili  x   /  AST  12  /  ALT  6   /  AlkPhos  88  06-05    PT/INR - ( 2025 04:37 )   PT: 14.2 sec;   INR: 1.20 ratio         PTT - ( 2025 04:37 )  PTT:28.1 sec        TELEMETRY: Sinus Rhythm/Tachycardia      Echocardiogram:  < from: TTE W or WO Ultrasound Enhancing Agent (25 @ 06:31) >     CONCLUSIONS:      1. The left ventricular cavity is normal in size. Left ventricular wall thickness is normal. Left ventricular systolic function is mildly to moderately decreased with an ejection fraction visually estimated at 40 to 45%. There are regional wall motion abnormalities present. Hypokinesis of the inferior and inferolateral walls.   2. Normal right ventricular cavity size and probably normal right ventricular systolic function. Tricuspid annular plane systolic excursion (TAPSE) is 2.0 cm (normal >=1.7 cm).   3. Structurally normal mitral valve with normal leaflet excursion. There is calcification of the mitral valve annulus. There is mild leaflet calcification. There is mild to moderatemitral regurgitation.   4. The aortic valve anatomy cannot be determined. There is calcification of the aortic valve leaflets. The peak transaortic velocity is 3.51 m/s, peak transaortic gradient is 49.3 mmHg and mean transaortic gradient is 26.0 mmHg with an LVOT/aortic valve VTI ratio of 0.25. The gross appearance of the aortic valve is consistent with significant aortic stenosis. However, unable to accurately estimate the aortic valve area. There is mild aortic regurgitation.   5. The left atrium is moderately dilated with an indexed volume of 45.18 ml/m².   6. The tricuspid valve is structurally normal with normal leaflet excursion. There is mild tricuspid regurgitation. Estimated pulmonary artery systolic pressure is 42 mmHg, consistent with mild pulmonary hypertension.   7. Left pleural effusion noted.   8. No prior echocardiogram is available for comparison.    ____________________________________________________________________  Recommendations:  Consider JEAN for further evaluation of the aortic valve, if clinically indicated.     ________________________________________________________________________________________  FINDINGS:     Left Ventricle:  The left ventricular cavity is normal in size. Left ventricular wall thicknessis normal. Left ventricular systolic function is mildly to moderately decreased with an ejection fraction visually estimated at 40 to 45%. There are regional wall motion abnormalities present. Hypokinesis of the inferior and inferolateral walls.     Right Ventricle:  The right ventricular cavity is normal in size and right ventricular systolic function is probably normal. Tricuspid annular plane systolic excursion (TAPSE) is 2.0 cm (normal >=1.7 cm).     Left Atrium:  The left atrium is moderately dilated with an indexed volume of 45.18 ml/m².     Right Atrium:  The right atrium is normal in size with an indexed volume of 19.92 ml/m² and an indexed area of 8.16 cm²/m².     Aortic Valve:  The aortic valve anatomy cannot be determined. There iscalcification of the aortic valve leaflets. The peak transaortic velocity is 3.51 m/s, peak transaortic gradient is 49.3 mmHg and mean transaortic gradient is 26.0 mmHg with an LVOT/aortic valve VTI ratio of 0.25. The gross appearance of the aortic valve is consistent with significant aortic stenosis. However, unable to accurately estimate the aortic valve area. There is mild aortic regurgitation.     Mitral Valve:  Structurally normal mitral valve with normal leaflet excursion. There is calcification of the mitral valve annulus. There is mild leaflet calcification. There is mild to moderate mitral regurgitation.     Tricuspid Valve:  The tricuspid valve is structurally normal with normal leaflet excursion. There is mild tricuspid regurgitation. Estimated pulmonary artery systolic pressure is 42 mmHg, consistent with mild pulmonary hypertension.     Pulmonic Valve:  Structurally normal pulmonic valve with normal leaflet excursion. There is trace pulmonic regurgitation.     Aorta:  The aortic arch is not well visualized. The aortic root at the sinuses of Valsalva is normal in size, measuring 3.00 cm (indexed 1.46 cm/m²). The ascending aorta is normal in size.     Pericardium:  No pericardial effusion seen.     Pleura:  Left pleural effusion noted.     Systemic Veins:  The inferior vena cava is normal in size measuring 2.10 cm in diameter, (normal <2.1cm) with normal inspiratory collapse (normal >50%) consistent with normal right atrial pressure (~3, range 0-5mmHg).  ____________________________________________________________________  QUANTITATIVE DATA:  Left Ventricle Measurements: (Indexed to BSA)     IVSd (2D):   0.9 cm  LVPWd (2D):  0.9 cm  LVIDd (2D):  5.5 cm  LVIDs (2D):  4.7 cm  LV Mass:     183 g  88.9 g/m²  Visualized LV EF%: 40 to 45%     MV E Vmax:    1.37 m/s  MV A Vmax:    1.06 m/s  MV E/A:       1.29  e' lateral:   7.51 cm/s  e' medial:    7.29 cm/s  E/e' lateral: 18.24  E/e' medial:  18.79  E/e' Average: 18.51  MV DT:        118 msec    Aorta Measurements: (Normal range) (Indexed to BSA)     Ao Root d     3.00 cm (3.1 - 3.7 cm) 1.46 cm/m²  Ao Asc d, 2D: 2.50  Ao Asc prox:  2.50 cm                1.21 cm/m²            Left Atrium Measurements: (Indexed to BSA)  LA Diam 2D: 4.40 cm         Right Ventricle Measurements: Right Atrial Measurements:     TAPSE:            2.0 cm      RA Vol s, MOD A4C         41.0 ml  RV S' Vmax:       11.90 cm/s  RA Vol s, MOD A4C i BSA   19.92 ml/m²  RV Base (RVID1):  3.0 cm      RA Area s, MOD A4C        16.8 cm²  RV Mid (RVID2):   2.6 cm      RA Area s, MOD A4C, i BSA 8.16 cm²/m²  RV Major (RVID3): 5.1 cm       LVOT / RVOT/ Qp/Qs Data: (Indexed to BSA)  LVOT Vmax:      0.87 m/s  LVOT Vmn:       0.590 m/s  LVOT VTI:       18.90 cm  LVOT peak grad: 3 mmHg  LVOT mean grad: 1.5 mmHg    Aortic Valve Measurements:  AV Vmax:                3.5 m/s  AV Peak Gradient:       49.3 mmHg  AV Mean Gradient:       26.0 mmHg  AV VTI:                 75.3 cm  AV VTI Ratio:           0.25  AoV Dimensionless Index 0.25    Mitral Valve Measurements:     MV E Vmax: 1.4 m/s  MV A Vmax: 1.1 m/s  MV E/A:    1.3       Tricuspid Valve Measurements:     TR Vmax:          3.1 m/s  TR Peak Gradient: 38.7 mmHg  RA Pressure:      3 mmHg  PASP:             42 mmHg

## 2025-06-06 ENCOUNTER — TRANSCRIPTION ENCOUNTER (OUTPATIENT)
Age: 85
End: 2025-06-06

## 2025-06-06 LAB
ALBUMIN SERPL ELPH-MCNC: 3.9 G/DL — SIGNIFICANT CHANGE UP (ref 3.3–5)
ALP SERPL-CCNC: 93 U/L — SIGNIFICANT CHANGE UP (ref 40–120)
ALT FLD-CCNC: 7 U/L — SIGNIFICANT CHANGE UP (ref 4–41)
ANION GAP SERPL CALC-SCNC: 18 MMOL/L — HIGH (ref 7–14)
ANISOCYTOSIS BLD QL: SLIGHT — SIGNIFICANT CHANGE UP
AST SERPL-CCNC: 14 U/L — SIGNIFICANT CHANGE UP (ref 4–40)
BASOPHILS # BLD AUTO: 0 K/UL — SIGNIFICANT CHANGE UP (ref 0–0.2)
BASOPHILS NFR BLD AUTO: 0 % — SIGNIFICANT CHANGE UP (ref 0–2)
BILIRUB SERPL-MCNC: 0.8 MG/DL — SIGNIFICANT CHANGE UP (ref 0.2–1.2)
BUN SERPL-MCNC: 50 MG/DL — HIGH (ref 7–23)
CALCIUM SERPL-MCNC: 8.3 MG/DL — LOW (ref 8.4–10.5)
CHLORIDE SERPL-SCNC: 103 MMOL/L — SIGNIFICANT CHANGE UP (ref 98–107)
CO2 SERPL-SCNC: 26 MMOL/L — SIGNIFICANT CHANGE UP (ref 22–31)
CREAT SERPL-MCNC: 2.97 MG/DL — HIGH (ref 0.5–1.3)
DACRYOCYTES BLD QL SMEAR: SLIGHT — SIGNIFICANT CHANGE UP
EGFR: 20 ML/MIN/1.73M2 — LOW
EGFR: 20 ML/MIN/1.73M2 — LOW
EOSINOPHIL # BLD AUTO: 0.04 K/UL — SIGNIFICANT CHANGE UP (ref 0–0.5)
EOSINOPHIL NFR BLD AUTO: 1.8 % — SIGNIFICANT CHANGE UP (ref 0–6)
GAS PNL BLDV: SIGNIFICANT CHANGE UP
GIANT PLATELETS BLD QL SMEAR: PRESENT — SIGNIFICANT CHANGE UP
GLUCOSE SERPL-MCNC: 148 MG/DL — HIGH (ref 70–99)
HCT VFR BLD CALC: 25.8 % — LOW (ref 39–50)
HGB BLD-MCNC: 8.6 G/DL — LOW (ref 13–17)
IANC: 1.23 K/UL — LOW (ref 1.8–7.4)
LYMPHOCYTES # BLD AUTO: 0.72 K/UL — LOW (ref 1–3.3)
LYMPHOCYTES # BLD AUTO: 30.1 % — SIGNIFICANT CHANGE UP (ref 13–44)
MACROCYTES BLD QL: SIGNIFICANT CHANGE UP
MAGNESIUM SERPL-MCNC: 2.3 MG/DL — SIGNIFICANT CHANGE UP (ref 1.6–2.6)
MANUAL SMEAR VERIFICATION: SIGNIFICANT CHANGE UP
MCHC RBC-ENTMCNC: 33.3 G/DL — SIGNIFICANT CHANGE UP (ref 32–36)
MCHC RBC-ENTMCNC: 34.1 PG — HIGH (ref 27–34)
MCV RBC AUTO: 102.4 FL — HIGH (ref 80–100)
MONOCYTES # BLD AUTO: 0.21 K/UL — SIGNIFICANT CHANGE UP (ref 0–0.9)
MONOCYTES NFR BLD AUTO: 8.9 % — SIGNIFICANT CHANGE UP (ref 2–14)
NEUTROPHILS # BLD AUTO: 1.06 K/UL — LOW (ref 1.8–7.4)
NEUTROPHILS NFR BLD AUTO: 44.2 % — SIGNIFICANT CHANGE UP (ref 43–77)
OVALOCYTES BLD QL SMEAR: SLIGHT — SIGNIFICANT CHANGE UP
PHOSPHATE SERPL-MCNC: 4.4 MG/DL — SIGNIFICANT CHANGE UP (ref 2.5–4.5)
PLAT MORPH BLD: NORMAL — SIGNIFICANT CHANGE UP
PLATELET # BLD AUTO: 58 K/UL — LOW (ref 150–400)
PLATELET COUNT - ESTIMATE: ABNORMAL
POIKILOCYTOSIS BLD QL AUTO: SLIGHT — SIGNIFICANT CHANGE UP
POLYCHROMASIA BLD QL SMEAR: SLIGHT — SIGNIFICANT CHANGE UP
POTASSIUM SERPL-MCNC: 3.6 MMOL/L — SIGNIFICANT CHANGE UP (ref 3.5–5.3)
POTASSIUM SERPL-SCNC: 3.6 MMOL/L — SIGNIFICANT CHANGE UP (ref 3.5–5.3)
PROT SERPL-MCNC: 6.7 G/DL — SIGNIFICANT CHANGE UP (ref 6–8.3)
RBC # BLD: 2.52 M/UL — LOW (ref 4.2–5.8)
RBC # FLD: 24.3 % — HIGH (ref 10.3–14.5)
RBC BLD AUTO: ABNORMAL
SODIUM SERPL-SCNC: 147 MMOL/L — HIGH (ref 135–145)
VARIANT LYMPHS # BLD: 15 % — HIGH (ref 0–6)
VARIANT LYMPHS NFR BLD MANUAL: 15 % — HIGH (ref 0–6)
WBC # BLD: 2.39 K/UL — LOW (ref 3.8–10.5)
WBC # FLD AUTO: 2.39 K/UL — LOW (ref 3.8–10.5)

## 2025-06-06 PROCEDURE — 99291 CRITICAL CARE FIRST HOUR: CPT

## 2025-06-06 PROCEDURE — 71045 X-RAY EXAM CHEST 1 VIEW: CPT | Mod: 26

## 2025-06-06 PROCEDURE — 99233 SBSQ HOSP IP/OBS HIGH 50: CPT

## 2025-06-06 PROCEDURE — 99232 SBSQ HOSP IP/OBS MODERATE 35: CPT

## 2025-06-06 RX ORDER — AMPICILLIN SODIUM 1 G/1
1 INJECTION, POWDER, FOR SOLUTION INTRAMUSCULAR; INTRAVENOUS ONCE
Refills: 0 | Status: COMPLETED | OUTPATIENT
Start: 2025-06-06 | End: 2025-06-06

## 2025-06-06 RX ORDER — BUMETANIDE 1 MG/1
3 TABLET ORAL
Refills: 0 | Status: DISCONTINUED | OUTPATIENT
Start: 2025-06-06 | End: 2025-06-06

## 2025-06-06 RX ORDER — PREDNISONE 20 MG/1
40 TABLET ORAL DAILY
Refills: 0 | Status: COMPLETED | OUTPATIENT
Start: 2025-06-06 | End: 2025-06-09

## 2025-06-06 RX ORDER — AMPICILLIN SODIUM 1 G/1
1 INJECTION, POWDER, FOR SOLUTION INTRAMUSCULAR; INTRAVENOUS EVERY 6 HOURS
Refills: 0 | Status: DISCONTINUED | OUTPATIENT
Start: 2025-06-06 | End: 2025-06-06

## 2025-06-06 RX ORDER — ISOSORBDIE DINITRATE 30 MG/1
10 TABLET ORAL THREE TIMES A DAY
Refills: 0 | Status: DISCONTINUED | OUTPATIENT
Start: 2025-06-06 | End: 2025-06-13

## 2025-06-06 RX ORDER — AMPICILLIN SODIUM 1 G/1
INJECTION, POWDER, FOR SOLUTION INTRAMUSCULAR; INTRAVENOUS
Refills: 0 | Status: DISCONTINUED | OUTPATIENT
Start: 2025-06-06 | End: 2025-06-06

## 2025-06-06 RX ADMIN — ISOSORBDIE DINITRATE 10 MILLIGRAM(S): 30 TABLET ORAL at 11:15

## 2025-06-06 RX ADMIN — AMPICILLIN SODIUM 100 GRAM(S): 1 INJECTION, POWDER, FOR SOLUTION INTRAMUSCULAR; INTRAVENOUS at 02:00

## 2025-06-06 RX ADMIN — GABAPENTIN 300 MILLIGRAM(S): 400 CAPSULE ORAL at 17:21

## 2025-06-06 RX ADMIN — ROSUVASTATIN CALCIUM 20 MILLIGRAM(S): 20 TABLET, FILM COATED ORAL at 23:05

## 2025-06-06 RX ADMIN — Medication 1 APPLICATION(S): at 05:19

## 2025-06-06 RX ADMIN — APIXABAN 2.5 MILLIGRAM(S): 2.5 TABLET, FILM COATED ORAL at 05:18

## 2025-06-06 RX ADMIN — Medication 37.5 MILLIGRAM(S): at 05:18

## 2025-06-06 RX ADMIN — Medication 37.5 MILLIGRAM(S): at 23:06

## 2025-06-06 RX ADMIN — Medication 37.5 MILLIGRAM(S): at 15:51

## 2025-06-06 RX ADMIN — PREDNISONE 40 MILLIGRAM(S): 20 TABLET ORAL at 11:48

## 2025-06-06 RX ADMIN — ISOSORBDIE DINITRATE 10 MILLIGRAM(S): 30 TABLET ORAL at 19:56

## 2025-06-06 RX ADMIN — Medication 40 MILLIGRAM(S): at 06:07

## 2025-06-06 RX ADMIN — AMPICILLIN SODIUM 100 GRAM(S): 1 INJECTION, POWDER, FOR SOLUTION INTRAMUSCULAR; INTRAVENOUS at 06:04

## 2025-06-06 RX ADMIN — Medication 650 MILLIGRAM(S): at 00:30

## 2025-06-06 RX ADMIN — CLOPIDOGREL BISULFATE 75 MILLIGRAM(S): 75 TABLET, FILM COATED ORAL at 11:15

## 2025-06-06 RX ADMIN — Medication 200 MILLIGRAM(S): at 11:15

## 2025-06-06 RX ADMIN — Medication 40 MILLIEQUIVALENT(S): at 08:00

## 2025-06-06 RX ADMIN — GABAPENTIN 300 MILLIGRAM(S): 400 CAPSULE ORAL at 05:19

## 2025-06-06 RX ADMIN — APIXABAN 2.5 MILLIGRAM(S): 2.5 TABLET, FILM COATED ORAL at 17:21

## 2025-06-06 NOTE — DISCHARGE NOTE PROVIDER - NSDCCPCAREPLAN_GEN_ALL_CORE_FT
PRINCIPAL DISCHARGE DIAGNOSIS  Diagnosis: Acute respiratory failure with hypoxia  Assessment and Plan of Treatment:       SECONDARY DISCHARGE DIAGNOSES  Diagnosis: Acute exacerbation of CHF (congestive heart failure)  Assessment and Plan of Treatment:      PRINCIPAL DISCHARGE DIAGNOSIS  Diagnosis: Acute respiratory failure with hypoxia  Assessment and Plan of Treatment: You presented with shortness of breath ad acute hypoxic respiratory failure in setting of heart failure exacerbation. Noted to by hypoxic to the 80s in ED, placed on bipap. Transfered to Cardiac care unit and completed bumex drip and nitro drip, now weaned to nasal cannula to room air. Per Heart failure team, you should resume your home oral lasix 40mg daily on 6/14.  Continue hydralazine, isordil, Toprol 12.5 daily. Echocardiogram with severe Aortic stenosis, however not candidate for R/L heart cath or TAVR per interventional Cards.      SECONDARY DISCHARGE DIAGNOSES  Diagnosis: Chronic atrial fibrillation  Assessment and Plan of Treatment: continue Eliquis (an anticoagulation) - used for stroke prevention in setting of afib history.    Diagnosis: MDS (myelodysplastic syndrome)  Assessment and Plan of Treatment: Follow up with Dr. Zita Mcmahon for ongoing MDS management. Also to schedule outpatient MRI to evaluate possible thoracic spine fx seen on CT chest on 6/12.    Diagnosis: Acute kidney injury superimposed on CKD  Assessment and Plan of Treatment: continue lasix. You presented with kidney injury which is now improving. Continue calcium acetate 667 po 3 times a day.    Diagnosis: Acute exacerbation of CHF (congestive heart failure)  Assessment and Plan of Treatment: continue Toprol, hydralazine, isordil, and lasix     PRINCIPAL DISCHARGE DIAGNOSIS  Diagnosis: Acute respiratory failure with hypoxia  Assessment and Plan of Treatment: You presented with shortness of breath ad acute hypoxic respiratory failure in setting of heart failure exacerbation. Noted to by hypoxic to the 80s in ED, placed on bipap. Transfered to Cardiac care unit and completed bumex drip and nitro drip, now weaned to nasal cannula to room air. Per Heart failure team, you should resume your home oral lasix 40mg daily on 6/14.  Continue hydralazine, isordil, Toprol 12.5 daily. Echocardiogram with severe Aortic stenosis, however not candidate for R/L heart cath or TAVR per interventional Cards.      SECONDARY DISCHARGE DIAGNOSES  Diagnosis: Chronic atrial fibrillation  Assessment and Plan of Treatment: continue Eliquis (an anticoagulation) - used for stroke prevention in setting of afib history.    Diagnosis: MDS (myelodysplastic syndrome)  Assessment and Plan of Treatment: Follow up with Dr. Zita Mcmahon for ongoing MDS management. Also to schedule outpatient MRI to evaluate possible thoracic spine fx seen on CT chest on 6/12.    Diagnosis: Acute kidney injury superimposed on CKD  Assessment and Plan of Treatment: continue lasix. You presented with kidney injury which is now improving. Continue calcium acetate 667 po 3 times a day.    Diagnosis: Acute exacerbation of CHF (congestive heart failure)  Assessment and Plan of Treatment: continue Toprol, hydralazine, isordil, and lasix    Diagnosis: Steroid-induced hyperglycemia  Assessment and Plan of Treatment: You experienced higher sugar levels than your baseline due to breif course of steroids this admission. You were given lantus 10 units at bedtime with improvement. Insulin discontinued at discharge given improvement of sugar levels once you completed steroids.  Hgb A1c 5.5% which is normal.

## 2025-06-06 NOTE — PROGRESS NOTE ADULT - CRITICAL CARE ATTENDING COMMENT
84 year old man with MDS, HTN HLD, USHA sp stent, AF who presented with progressive dyspnea and fluid overload. Placed on Bipap and started on both bumex and nitro gtt     TTE 6/4/25:   1. The left ventricular cavity is normal in size. Left ventricular wall thickness is normal. Left ventricular systolic function is mildly to moderately decreased with an ejection fraction visually estimated at 40 to 45%. There are regional wall motion abnormalities present. Hypokinesis of the inferior and inferolateral walls.   2. Normal right ventricular cavity size and probably normal right ventricular systolic function. Tricuspid annular plane systolic excursion (TAPSE) is 2.0 cm (normal >=1.7 cm).   3. Structurally normal mitral valve with normal leaflet excursion. There is calcification of the mitral valve annulus. There is mild leaflet calcification. There is mild to moderate mitral regurgitation.   4. The aortic valve anatomy cannot be determined. There is calcification of the aortic valve leaflets. The peak transaortic velocity is 3.51 m/s, peak transaortic gradient is 49.3 mmHg and mean transaortic gradient is 26.0 mmHg with an LVOT/aortic valve VTI ratio of 0.25. The gross appearance of the aortic valve is consistent with significant aortic stenosis. However, unable to accurately estimate the aortic valve area. There is mild aortic regurgitation.   5. The left atrium is moderately dilated with an indexed volume of 45.18 ml/m².   6. The tricuspid valve is structurally normal with normal leaflet excursion. There is mild tricuspid regurgitation. Estimated pulmonary artery systolic pressure is 42 mmHg, consistent with mild pulmonary hypertension.   7. Left pleural effusion noted.   8. No prior echocardiogram is available for comparison.    Meds:  Bumex 3 mg IV BID  ASA  Plavix  Hydral 37.5 mg TID  Isordil 10 mg TID  Crestor 10 mg daily  Eliquis 2.5 mg BID  Levaquin  Protonix 40 mg daily  Neurontin 300 mg BID    #Neuro- No active issues  #Pulm- On BiPAP-wean as tolerates  #CV- HF with mild to moderately reduced EF (40-45%), mild to moderate MR, significant AS, AF  Continue Bumex 3 mg IV BID  Added afterload reduction with hydral and isordil  Continue plavix/statin  Eventual workup for AS once stable  HF input appreciated  Continue Eliquis  #Renal- CKD stage 3  Continue to monitor 84 year old man with MDS, HTN HLD, USHA sp stent, AF who presented with progressive dyspnea and fluid overload. Placed on Bipap and started on both bumex and nitro gtt     TTE 6/4/25:   1. The left ventricular cavity is normal in size. Left ventricular wall thickness is normal. Left ventricular systolic function is mildly to moderately decreased with an ejection fraction visually estimated at 40 to 45%. There are regional wall motion abnormalities present. Hypokinesis of the inferior and inferolateral walls.   2. Normal right ventricular cavity size and probably normal right ventricular systolic function. Tricuspid annular plane systolic excursion (TAPSE) is 2.0 cm (normal >=1.7 cm).   3. Structurally normal mitral valve with normal leaflet excursion. There is calcification of the mitral valve annulus. There is mild leaflet calcification. There is mild to moderate mitral regurgitation.   4. The aortic valve anatomy cannot be determined. There is calcification of the aortic valve leaflets. The peak transaortic velocity is 3.51 m/s, peak transaortic gradient is 49.3 mmHg and mean transaortic gradient is 26.0 mmHg with an LVOT/aortic valve VTI ratio of 0.25. The gross appearance of the aortic valve is consistent with significant aortic stenosis. However, unable to accurately estimate the aortic valve area. There is mild aortic regurgitation.   5. The left atrium is moderately dilated with an indexed volume of 45.18 ml/m².   6. The tricuspid valve is structurally normal with normal leaflet excursion. There is mild tricuspid regurgitation. Estimated pulmonary artery systolic pressure is 42 mmHg, consistent with mild pulmonary hypertension.   7. Left pleural effusion noted.   8. No prior echocardiogram is available for comparison.    Meds:  Bumex 3 mg IV BID  ASA  Plavix  Hydral 37.5 mg TID  Isordil 10 mg TID  Crestor 10 mg daily  Eliquis 2.5 mg BID  Levaquin  Protonix 40 mg daily  Neurontin 300 mg BID    #Neuro- No active issues  #Pulm- On BiPAP-wean as tolerates  #CV- HF with mild to moderately reduced EF (40-45%), mild to moderate MR, significant AS, AF  Continue Bumex 3 mg IV BID  Added afterload reduction with hydral and isordil  Continue plavix/statin  Eventual workup for AS once stable  HF input appreciated  Continue Eliquis

## 2025-06-06 NOTE — PROGRESS NOTE ADULT - ASSESSMENT
85 y/o male, PMHx of CAD (C 3/18/21 mild LAD 60%, OM1 60%, Prox  ), HFrEF (JEAN 12/26/24 LVEF 35%, trace TR, MR), AFIB (on Eliquis), Renal Artery Stenosis, s/p left renal stent, HTN, Anemia, Prostate Cancer, MDS currently on chemotherapy via PICC line, last dose was in mid April. admitted for decompensated on chronic HFrEF and admitted to CCU for further management.  Heart Failure team consulted, Overall stage C HF, NYHA class IV-severely hypervolemic and hypoxic requiring BIPAP.  Subsequent repeat TTE done 6/4/25 showing significant aortic stenosis, possible severe. Interventional cardiology consulted for TAVR evaluation.    # Aortic Stenosis  # Acute systolic heart failure     - Euvolemic on exam, soft SBP 90s, on supplemental O2, 4L NC  - TTE 6/4:  EF 40-45%, RWMA present.  Hypokinesis of the inferior and inferolateral walls.  Moderate MR. pVel 3.51, pGrad 49.3, mGrad 26.0, LVOT/aortic valve VTI ratio of 0.25.  The gross appearance of the aortic valve is consistent with significant aortic stenosis. However, unable to accurately estimate the aortic valve area.  Mild to moderate AR.   - Bumex and Nitro gtt d/c'ed 6/6  - Interventional cards consulted.  Discussed with patient, details and indication for further TAVR evaluation with Right/Left Heart Cath, JEAN and CTA TAVR, to which he is amendable   - continue Hydralazine 37.5mg TID (hold SBP < 100)  - continue Isordil 10mg TID (hold SBP < 100)  - HF following, recs appreciated  - monitor electrolytes, replete to keep K > 4, Mg > 2  - Strict I/Os. Daily weights, fluid restriction  - case discussed with interventionalist, Dr Mendoza, will monitor Cr/CBC over the weekend and possible R/C Monday if Cr stabilizes and Hgb/Plts improves    # CAD  # USHA  - s/p Dx [facility]  2021:  LM normal, LAD 60%, OM1 60%, %   - s/p L Renal stent on DAPT  - continue Rosuvastatin 20mg daily at bedtime    # JUAN RAMON on CKD  - CKD stage 3, baseline Cr ~ 2  - Nephro following, recs appreciated  - Cr up-trending 2.97 today.  Trend BUN/Cr daily. Avoid nephrotoxic agents  - continue Bumex gtt as prescribed  - h/o Renal artery stent, on DAPT  - Further plan per Nephro    # Paroxysmal Atrial Fibrillation   - NSR, HR stable 80s  - EP consulted, recs appreciated  - continue Eliquis 2.5mg BID (renally dosed)  - BB held iso heart failure exacerbation    # Anemia  # Myelodysplastic Syndrome (MDS)  - History of MDS, last chemo dose 5/20/25   - Follows with outpatient HemeOnc  - H/H stable 8s, Plts 58  - On Levofloxacin for neutropenia  - Further plan per primary team     83 y/o male, PMHx of CAD (C 3/18/21 mild LAD 60%, OM1 60%, Prox  ), HFrEF (JEAN 12/26/24 LVEF 35%, trace TR, MR), AFIB (on Eliquis), Renal Artery Stenosis, s/p left renal stent, HTN, Anemia, Prostate Cancer, MDS currently on chemotherapy via PICC line, last dose was in mid April. admitted for decompensated on chronic HFrEF and admitted to CCU for further management.  Heart Failure team consulted, Overall stage C HF, NYHA class IV-severely hypervolemic and hypoxic requiring BIPAP.  Subsequent repeat TTE done 6/4/25 showing significant aortic stenosis, possible severe. Interventional cardiology consulted for TAVR evaluation.    # Aortic Stenosis  # Acute systolic heart failure     - Euvolemic on exam, soft SBP 90s, on supplemental O2, 4L NC  - TTE 6/4:  EF 40-45%, RWMA present.  Hypokinesis of the inferior and inferolateral walls.  Moderate MR. pVel 3.51, pGrad 49.3, mGrad 26.0, LVOT/aortic valve VTI ratio of 0.25.  The gross appearance of the aortic valve is consistent with significant aortic stenosis. However, unable to accurately estimate the aortic valve area.  Mild to moderate AR.   - Interventional cards consulted.  Discussed with patient, details and indication for further TAVR evaluation with Right/Left Heart Cath, JEAN and CTA TAVR, to which he is amendable   - Bumex gtt d/ricardo 6/6  - Nitro gtt d/c'ed 6/5  - continue Hydralazine 37.5mg TID and Isordil 10mg TID (hold SBP < 100).  HF following, recs appreciated  - monitor electrolytes, replete to keep K > 4, Mg > 2  - Strict I/Os. Daily weights, fluid restriction  - case discussed with interventionalist, Dr Mendoza, will monitor Cr/CBC over the weekend and possible R/C Monday if Cr stabilizes and Hgb/Plts improves    # CAD  # USHA  - s/p Dx [facility]  2021:  LM normal, LAD 60%, OM1 60%, %   - s/p L Renal stent on DAPT  - continue Rosuvastatin 20mg daily at bedtime    # JUAN RAMON on CKD  - CKD stage 3, baseline Cr ~ 2  - Nephro following, recs appreciated  - Cr up-trending 2.97 today.  Trend BUN/Cr daily. Avoid nephrotoxic agents  - continue Bumex gtt as prescribed  - h/o Renal artery stent, on DAPT  - Further plan per Nephro    # Paroxysmal Atrial Fibrillation   - NSR, HR stable 80s  - EP consulted, recs appreciated  - continue Eliquis 2.5mg BID (renally dosed)  - BB held iso heart failure exacerbation    # Anemia  # Myelodysplastic Syndrome (MDS)  - History of MDS, last chemo dose 5/20/25   - Follows with outpatient HemeOnc  - H/H stable 8s, Plts 58  - On Levofloxacin for neutropenia  - Further plan per primary team     83 y/o male, PMHx of CAD (C 3/18/21 mild LAD 60%, OM1 60%, Prox  ), HFrEF (JEAN 12/26/24 LVEF 35%, trace TR, MR), AFIB (on Eliquis), Renal Artery Stenosis, s/p left renal stent, HTN, Anemia, Prostate Cancer, MDS currently on chemotherapy via PICC line, last dose was in mid April. admitted for decompensated on chronic HFrEF and admitted to CCU for further management.  Heart Failure team consulted, Overall stage C HF, NYHA class IV-severely hypervolemic and hypoxic requiring BIPAP.  Subsequent repeat TTE done 6/4/25 showing significant aortic stenosis, possible severe. Interventional cardiology consulted for TAVR evaluation.    # Aortic Stenosis  # Acute systolic heart failure     - Euvolemic on exam, soft SBP 90s, on supplemental O2, 4L NC  - TTE 6/4:  EF 40-45%, RWMA present.  Hypokinesis of the inferior and inferolateral walls.  Moderate MR. pVel 3.51, pGrad 49.3, mGrad 26.0, LVOT/aortic valve VTI ratio of 0.25.  The gross appearance of the aortic valve is consistent with significant aortic stenosis. However, unable to accurately estimate the aortic valve area.  Mild to moderate AR.   - Interventional cards consulted.  Discussed with patient, details and indication for further TAVR evaluation with Right/Left Heart Cath, JEAN and CTA TAVR, to which he is amendable   - Bumex gtt d/ricardo 6/6  - Nitro gtt d/c'ed 6/5  - HF following, recs appreciated.  Gentle IVF on 50cc/hr X 5hours. Continue Hydralazine 37.5mg TID and Isordil 10mg TID (hold SBP < 100)  - monitor electrolytes, replete to keep K > 4, Mg > 2  - Strict I/Os. Daily weights, fluid restriction  - case discussed with interventionalist, Dr Mendoza, will monitor Cr/CBC over the weekend and possible R/[facility] Monday if Cr stabilizes and Hgb/Plts improves    # CAD  # USHA  - s/p Dx [facility]  2021:  LM normal, LAD 60%, OM1 60%, %   - s/p L Renal stent on DAPT  - continue Rosuvastatin 20mg daily at bedtime    # JUAN RAMON on CKD  - CKD stage 3, baseline Cr ~ 2  - Nephro following, recs appreciated  - Cr up-trending 2.97 today.  Trend BUN/Cr daily. Avoid nephrotoxic agents  - continue Bumex gtt as prescribed  - h/o Renal artery stent, on DAPT  - Further plan per Nephro    # Paroxysmal Atrial Fibrillation   - NSR, HR stable 80s  - EP consulted, recs appreciated  - continue Eliquis 2.5mg BID (renally dosed)  - BB held iso heart failure exacerbation    # Anemia  # Myelodysplastic Syndrome (MDS)  - History of MDS, last chemo dose 5/20/25   - Follows with outpatient HemeOnc  - H/H stable 8s, Plts 58  - On Levofloxacin for neutropenia  - Further plan per primary team     83 y/o male, PMHx of CAD (C 3/18/21 mild LAD 60%, OM1 60%, Prox  ), HFrEF (JEAN 12/26/24 LVEF 35%, trace TR, MR), AFIB (on Eliquis), Renal Artery Stenosis, s/p left renal stent, HTN, Anemia, Prostate Cancer, MDS currently on chemotherapy via PICC line, last dose was in mid April. admitted for decompensated on chronic HFrEF and admitted to CCU for further management.  Heart Failure team consulted, Overall stage C HF, NYHA class IV-severely hypervolemic and hypoxic requiring BIPAP.  Subsequent repeat TTE done 6/4/25 showing significant aortic stenosis, possible severe. Interventional cardiology consulted for TAVR evaluation.    # Aortic Stenosis  # Acute systolic heart failure     - Euvolemic on exam, soft SBP 90s, SpO2 96-97%, O2, 4L NC  - TTE 6/4:  EF 40-45%, RWMA present.  Hypokinesis of the inferior and inferolateral walls.  Moderate MR. pVel 3.51, pGrad 49.3, mGrad 26.0, LVOT/aortic valve VTI ratio of 0.25.  The gross appearance of the aortic valve is consistent with significant aortic stenosis. However, unable to accurately estimate the aortic valve area.  Mild to moderate AR.   - Interventional cards consulted.  Discussed with patient, details and indication for further TAVR evaluation with Right/Left Heart Cath, JEAN and CTA TAVR, to which he is amendable   - Bumex gtt d/ricardo 6/6  - Nitro gtt d/c'ed 6/5  - HF following, recs appreciated.  Gentle IVF on 50cc/hr X 5hours. Continue Hydralazine 37.5mg TID and Isordil 10mg TID (hold SBP < 100)  - monitor electrolytes, replete to keep K > 4, Mg > 2  - Strict I/Os. Daily weights, fluid restriction  - case discussed with interventionalist, Dr Mendoza, will monitor Cr/CBC over the weekend and possible R/[facility] Monday if Cr stabilizes and Hgb/Plts improves    # CAD  # USHA  - s/p Dx [facility]  2021:  LM normal, LAD 60%, OM1 60%, %   - s/p L Renal stent on DAPT  - continue Rosuvastatin 20mg daily at bedtime    # JUAN RAMON on CKD  - CKD stage 3, baseline Cr ~ 2  - Nephro following, recs appreciated  - Cr up-trending 2.97 today.  Trend BUN/Cr daily. Avoid nephrotoxic agents  - continue Bumex gtt as prescribed  - h/o Renal artery stent, on DAPT  - Further plan per Nephro    # Paroxysmal Atrial Fibrillation   - NSR, HR stable 80s  - EP consulted, recs appreciated  - continue Eliquis 2.5mg BID (renally dosed)  - BB held iso heart failure exacerbation    # Anemia  # Myelodysplastic Syndrome (MDS)  - History of MDS, last chemo dose 5/20/25   - Follows with outpatient HemeOnc  - H/H stable 8s, Plts 58  - On Levofloxacin for neutropenia  - Further plan per primary team     83 y/o male, PMHx of CAD (C 3/18/21 mild LAD 60%, OM1 60%, Prox  ), HFrEF (JEAN 12/26/24 LVEF 35%, trace TR, MR), AFIB (on Eliquis), Renal Artery Stenosis, s/p left renal stent, HTN, Anemia, Prostate Cancer, MDS currently on chemotherapy via PICC line, last dose was in mid April. admitted for decompensated on chronic HFrEF and admitted to CCU for further management.  Heart Failure team consulted, Overall stage C HF, NYHA class IV-severely hypervolemic and hypoxic requiring BIPAP.  Subsequent repeat TTE done 6/4/25 showing significant aortic stenosis, possible severe. Interventional cardiology consulted for TAVR evaluation.    # Aortic Stenosis  # Acute systolic heart failure     - Euvolemic on exam, soft SBP 90s, SpO2 96-97%, O2, 4L NC  - TTE 6/4:  EF 40-45%, RWMA present.  Hypokinesis of the inferior and inferolateral walls.  Moderate MR. pVel 3.51, pGrad 49.3, mGrad 26.0, LVOT/aortic valve VTI ratio of 0.25.  The gross appearance of the aortic valve is consistent with significant aortic stenosis. However, unable to accurately estimate the aortic valve area.  Mild to moderate AR.   - Interventional cards consulted.  Discussed with patient, details and indication for further TAVR evaluation with Right/Left Heart Cath, JEAN and CTA TAVR, to which he is amendable   - Bumex gtt d/ricardo 6/6  - Nitro gtt d/c'ed 6/5  - HF following, recs appreciated.  Gentle IVF on 50cc/hr X 5hours. Continue Hydralazine 37.5mg TID and Isordil 10mg TID (hold SBP < 100)  - monitor electrolytes, replete to keep K > 4, Mg > 2  - Strict I/Os. Daily weights, fluid restriction  - case discussed with interventionalist, Dr Mendoza, will monitor Cr/CBC over the weekend and possible R/[facility] Monday if Cr stabilizes and Hgb/Plts improves    # CAD  # USHA  - s/p Dx [facility]  2021:  LM normal, LAD 60%, OM1 60%, %   - s/p L Renal stent on DAPT  - continue Rosuvastatin 20mg daily at bedtime    # JUAN RAMON on CKD  - CKD stage 3, baseline Cr ~ 2  - Nephro following, recs appreciated  - Cr up-trending 2.97 today.  Trend BUN/Cr daily. Avoid nephrotoxic agents  - s/p Bumex gtt   - h/o Renal artery stent, on DAPT  - Further plan per Nephro    # Paroxysmal Atrial Fibrillation   - NSR, HR stable 80s  - EP consulted, recs appreciated  - continue Eliquis 2.5mg BID (renally dosed)  - BB held iso heart failure exacerbation    # Anemia  # Myelodysplastic Syndrome (MDS)  - History of MDS, last chemo dose 5/20/25   - Follows with outpatient HemeOnc  - H/H stable 8s, Plts 58  - On Levofloxacin for neutropenia  - Further plan per primary team

## 2025-06-06 NOTE — PROGRESS NOTE ADULT - SUBJECTIVE AND OBJECTIVE BOX
Interval History:  Patient resting comfortably in bed   Denies CP/SOB/palpitations/dizziness.  No acute events overnight.      Medications:  allopurinol 200 milliGRAM(s) Oral daily  apixaban      apixaban 2.5 milliGRAM(s) Oral two times a day  bumetanide IVPB 3 milliGRAM(s) IV Intermittent two times a day  chlorhexidine 2% Cloths 1 Application(s) Topical <User Schedule>  clopidogrel Tablet 75 milliGRAM(s) Oral daily  gabapentin 300 milliGRAM(s) Oral two times a day  hydrALAZINE 37.5 milliGRAM(s) Oral three times a day  isosorbide   dinitrate Tablet (ISORDIL) 10 milliGRAM(s) Oral three times a day  levoFLOXacin  Tablet 250 milliGRAM(s) Oral every 24 hours  melatonin 3 milliGRAM(s) Oral at bedtime PRN  pantoprazole    Tablet 40 milliGRAM(s) Oral before breakfast  rosuvastatin 20 milliGRAM(s) Oral at bedtime      Vitals:  T(C): 36.4 (25 @ 08:00), Max: 37.2 (25 @ 16:00)  HR: 80 (25 @ 09:00) (79 - 115)  BP: 111/66 (25 @ 09:00) (94/82 - 130/80)  BP(mean): 79 (25 @ 09:00) (67 - 117)  RR: 16 (25 @ 09:00) (14 - 23)  SpO2: 99% (25 @ 09:00) (89% - 99%)    Daily     Daily Weight in k.3 (2025 05:00)        I&O's Summary    2025 07:01  -  2025 07:00  --------------------------------------------------------  IN: 1035 mL / OUT: 3820 mL / NET: -2785 mL    2025 07:01  -  2025 10:01  --------------------------------------------------------  IN: 150 mL / OUT: 350 mL / NET: -200 mL        Physical Exam:  Appearance: No Acute Distress  Neck: JVP  Cardiovascular: Normal S1 S2  Respiratory: Clear to auscultation bilaterally  Gastrointestinal: Soft, Non-tender	  Skin: No cyanosis	  Neurologic: Non-focal  Extremities: LE edema      Labs:                        8.6    2.39  )-----------( 58       ( 2025 05:57 )             25.8     06-06    147[H]  |  103  |  50[H]  ----------------------------<  148[H]  3.6   |  26  |  2.97[H]    Ca    8.3[L]      2025 05:57  Phos  4.4     -  Mg     2.30     -    TPro  6.7  /  Alb  3.9  /  TBili  0.8  /  DBili  x   /  AST  14  /  ALT  7   /  AlkPhos  93  -      TELEMETRY:    Echocardiogram:  < from: TTE W or WO Ultrasound Enhancing Agent (25 @ 06:31) >       CONCLUSIONS:      1. The left ventricular cavity is normal in size. Left ventricular wall thickness is normal. Left ventricular systolic function is mildly to moderately decreased with an ejection fraction visually estimated at 40 to 45%. There are regional wall motion abnormalities present. Hypokinesis of the inferior and inferolateral walls.   2. Normal right ventricular cavity size and probably normal right ventricular systolic function. Tricuspid annular plane systolic excursion (TAPSE) is 2.0 cm (normal >=1.7 cm).   3. Structurally normal mitral valve with normal leaflet excursion. There is calcification of the mitral valve annulus. There is mild leaflet calcification. There is mild to moderatemitral regurgitation.   4. The aortic valve anatomy cannot be determined. There is calcification of the aortic valve leaflets. The peak transaortic velocity is 3.51 m/s, peak transaortic gradient is 49.3 mmHg and mean transaortic gradient is 26.0 mmHg with an LVOT/aortic valve VTI ratio of 0.25. The gross appearance of the aortic valve is consistent with significant aortic stenosis. However, unable to accurately estimate the aortic valve area. There is mild aortic regurgitation.   5. The left atrium is moderately dilated with an indexed volume of 45.18 ml/m².   6. The tricuspid valve is structurally normal with normal leaflet excursion. There is mild tricuspid regurgitation. Estimated pulmonary artery systolic pressure is 42 mmHg, consistent with mild pulmonary hypertension.   7. Left pleural effusion noted.   8. No prior echocardiogram is available for comparison.    ____________________________________________________________________  Recommendations:  Consider JEAN for further evaluation of the aortic valve, if clinically indicated.     ________________________________________________________________________________________  FINDINGS:     Left Ventricle:  The left ventricular cavity is normal in size. Left ventricular wall thicknessis normal. Left ventricular systolic function is mildly to moderately decreased with an ejection fraction visually estimated at 40 to 45%. There are regional wall motion abnormalities present. Hypokinesis of the inferior and inferolateral walls.     Right Ventricle:  The right ventricular cavity is normal in size and right ventricular systolic function is probably normal. Tricuspid annular plane systolic excursion (TAPSE) is 2.0 cm (normal >=1.7 cm).     Left Atrium:  The left atrium is moderately dilated with an indexed volume of 45.18 ml/m².     Right Atrium:  The right atrium is normal in size with an indexed volume of 19.92 ml/m² and an indexed area of 8.16 cm²/m².     Aortic Valve:  The aortic valve anatomy cannot be determined. There iscalcification of the aortic valve leaflets. The peak transaortic velocity is 3.51 m/s, peak transaortic gradient is 49.3 mmHg and mean transaortic gradient is 26.0 mmHg with an LVOT/aortic valve VTI ratio of 0.25. The gross appearance of the aortic valve is consistent with significant aortic stenosis. However, unable to accurately estimate the aortic valve area. There is mild aortic regurgitation.     Mitral Valve:  Structurally normal mitral valve with normal leaflet excursion. There is calcification of the mitral valve annulus. There is mild leaflet calcification. There is mild to moderate mitral regurgitation.     Tricuspid Valve:  The tricuspid valve is structurally normal with normal leaflet excursion. There is mild tricuspid regurgitation. Estimated pulmonary artery systolic pressure is 42 mmHg, consistent with mild pulmonary hypertension.     Pulmonic Valve:  Structurally normal pulmonic valve with normal leaflet excursion. There is trace pulmonic regurgitation.     Aorta:  The aortic arch is not well visualized. The aortic root at the sinuses of Valsalva is normal in size, measuring 3.00 cm (indexed 1.46 cm/m²). The ascending aorta is normal in size.     Pericardium:  No pericardial effusion seen.     Pleura:  Left pleural effusion noted.     Systemic Veins:  The inferior vena cava is normal in size measuring 2.10 cm in diameter, (normal <2.1cm) with normal inspiratory collapse (normal >50%) consistent with normal right atrial pressure (~3, range 0-5mmHg).  ____________________________________________________________________  QUANTITATIVE DATA:  Left Ventricle Measurements: (Indexed to BSA)     IVSd (2D):   0.9 cm  LVPWd (2D):  0.9 cm  LVIDd (2D):  5.5 cm  LVIDs (2D):  4.7 cm  LV Mass:     183 g  88.9 g/m²  Visualized LV EF%: 40 to 45%     MV E Vmax:    1.37 m/s  MV A Vmax:    1.06 m/s  MV E/A:       1.29  e' lateral:   7.51 cm/s  e' medial:    7.29 cm/s  E/e' lateral: 18.24  E/e' medial:  18.79  E/e' Average: 18.51  MV DT:        118 msec    Aorta Measurements: (Normal range) (Indexed to BSA)     Ao Root d     3.00 cm (3.1 - 3.7 cm) 1.46 cm/m²  Ao Asc d, 2D: 2.50  Ao Asc prox:  2.50 cm                1.21 cm/m²            Left Atrium Measurements: (Indexed to BSA)  LA Diam 2D: 4.40 cm         Right Ventricle Measurements: Right Atrial Measurements:     TAPSE:            2.0 cm      RA Vol s, MOD A4C         41.0 ml  RV S' Vmax:       11.90 cm/s  RA Vol s, MOD A4C i BSA   19.92 ml/m²  RV Base (RVID1):  3.0 cm      RA Area s, MOD A4C        16.8 cm²  RV Mid (RVID2):   2.6 cm      RA Area s, MOD A4C, i BSA 8.16 cm²/m²  RV Major (RVID3): 5.1 cm       LVOT / RVOT/ Qp/Qs Data: (Indexed to BSA)  LVOT Vmax:      0.87 m/s  LVOT Vmn:       0.590 m/s  LVOT VTI:       18.90 cm  LVOT peak grad: 3 mmHg  LVOT mean grad: 1.5 mmHg    Aortic Valve Measurements:  AV Vmax:                3.5 m/s  AV Peak Gradient:       49.3 mmHg  AV Mean Gradient:       26.0 mmHg  AV VTI:                 75.3 cm  AV VTI Ratio:           0.25  AoV Dimensionless Index 0.25    Mitral Valve Measurements:     MV E Vmax: 1.4 m/s  MV A Vmax: 1.1 m/s  MV E/A:    1.3       Tricuspid Valve Measurements:     TR Vmax:          3.1 m/s  TR Peak Gradient: 38.7 mmHg  RA Pressure:      3 mmHg  PASP:             42 mmHg       Interval History:  Patient resting comfortably in bed   Denies CP/SOB/palpitations/dizziness.  No acute events overnight.      Medications:  allopurinol 200 milliGRAM(s) Oral daily  apixaban      apixaban 2.5 milliGRAM(s) Oral two times a day  bumetanide IVPB 3 milliGRAM(s) IV Intermittent two times a day  chlorhexidine 2% Cloths 1 Application(s) Topical <User Schedule>  clopidogrel Tablet 75 milliGRAM(s) Oral daily  gabapentin 300 milliGRAM(s) Oral two times a day  hydrALAZINE 37.5 milliGRAM(s) Oral three times a day  isosorbide   dinitrate Tablet (ISORDIL) 10 milliGRAM(s) Oral three times a day  levoFLOXacin  Tablet 250 milliGRAM(s) Oral every 24 hours  melatonin 3 milliGRAM(s) Oral at bedtime PRN  pantoprazole    Tablet 40 milliGRAM(s) Oral before breakfast  rosuvastatin 20 milliGRAM(s) Oral at bedtime      Vitals:  T(C): 36.4 (25 @ 08:00), Max: 37.2 (25 @ 16:00)  HR: 80 (25 @ 09:00) (79 - 115)  BP: 111/66 (25 @ 09:00) (94/82 - 130/80)  BP(mean): 79 (25 @ 09:00) (67 - 117)  RR: 16 (25 @ 09:00) (14 - 23)  SpO2: 99% (25 @ 09:00) (89% - 99%)    Daily     Daily Weight in k.3 (2025 05:00)        I&O's Summary    2025 07:01  -  2025 07:00  --------------------------------------------------------  IN: 1035 mL / OUT: 3820 mL / NET: -2785 mL    2025 07:01  -  2025 10:01  --------------------------------------------------------  IN: 150 mL / OUT: 350 mL / NET: -200 mL        Physical Exam:  Appearance: No Acute Distress  Neck: JVP~6  Cardiovascular: Normal S1 S2  Respiratory: Clear to auscultation bilaterally  Gastrointestinal: Soft, Non-tender	  Skin: No cyanosis	  Neurologic: Non-focal  Extremities: No BLE edema; warm to touch and R knee pain/redness/swelling (history of gout)       Labs:                        8.6    2.39  )-----------( 58       ( 2025 05:57 )             25.8     -    147[H]  |  103  |  50[H]  ----------------------------<  148[H]  3.6   |  26  |  2.97[H]    Ca    8.3[L]      2025 05:57  Phos  4.4     -  Mg     2.30     -    TPro  6.7  /  Alb  3.9  /  TBili  0.8  /  DBili  x   /  AST  14  /  ALT  7   /  AlkPhos  93  -06      TELEMETRY:     Echocardiogram:  < from: TTE W or WO Ultrasound Enhancing Agent (25 @ 06:31) >       CONCLUSIONS:      1. The left ventricular cavity is normal in size. Left ventricular wall thickness is normal. Left ventricular systolic function is mildly to moderately decreased with an ejection fraction visually estimated at 40 to 45%. There are regional wall motion abnormalities present. Hypokinesis of the inferior and inferolateral walls.   2. Normal right ventricular cavity size and probably normal right ventricular systolic function. Tricuspid annular plane systolic excursion (TAPSE) is 2.0 cm (normal >=1.7 cm).   3. Structurally normal mitral valve with normal leaflet excursion. There is calcification of the mitral valve annulus. There is mild leaflet calcification. There is mild to moderatemitral regurgitation.   4. The aortic valve anatomy cannot be determined. There is calcification of the aortic valve leaflets. The peak transaortic velocity is 3.51 m/s, peak transaortic gradient is 49.3 mmHg and mean transaortic gradient is 26.0 mmHg with an LVOT/aortic valve VTI ratio of 0.25. The gross appearance of the aortic valve is consistent with significant aortic stenosis. However, unable to accurately estimate the aortic valve area. There is mild aortic regurgitation.   5. The left atrium is moderately dilated with an indexed volume of 45.18 ml/m².   6. The tricuspid valve is structurally normal with normal leaflet excursion. There is mild tricuspid regurgitation. Estimated pulmonary artery systolic pressure is 42 mmHg, consistent with mild pulmonary hypertension.   7. Left pleural effusion noted.   8. No prior echocardiogram is available for comparison.    ____________________________________________________________________  Recommendations:  Consider JEAN for further evaluation of the aortic valve, if clinically indicated.     ________________________________________________________________________________________  FINDINGS:     Left Ventricle:  The left ventricular cavity is normal in size. Left ventricular wall thicknessis normal. Left ventricular systolic function is mildly to moderately decreased with an ejection fraction visually estimated at 40 to 45%. There are regional wall motion abnormalities present. Hypokinesis of the inferior and inferolateral walls.     Right Ventricle:  The right ventricular cavity is normal in size and right ventricular systolic function is probably normal. Tricuspid annular plane systolic excursion (TAPSE) is 2.0 cm (normal >=1.7 cm).     Left Atrium:  The left atrium is moderately dilated with an indexed volume of 45.18 ml/m².     Right Atrium:  The right atrium is normal in size with an indexed volume of 19.92 ml/m² and an indexed area of 8.16 cm²/m².     Aortic Valve:  The aortic valve anatomy cannot be determined. There iscalcification of the aortic valve leaflets. The peak transaortic velocity is 3.51 m/s, peak transaortic gradient is 49.3 mmHg and mean transaortic gradient is 26.0 mmHg with an LVOT/aortic valve VTI ratio of 0.25. The gross appearance of the aortic valve is consistent with significant aortic stenosis. However, unable to accurately estimate the aortic valve area. There is mild aortic regurgitation.     Mitral Valve:  Structurally normal mitral valve with normal leaflet excursion. There is calcification of the mitral valve annulus. There is mild leaflet calcification. There is mild to moderate mitral regurgitation.     Tricuspid Valve:  The tricuspid valve is structurally normal with normal leaflet excursion. There is mild tricuspid regurgitation. Estimated pulmonary artery systolic pressure is 42 mmHg, consistent with mild pulmonary hypertension.     Pulmonic Valve:  Structurally normal pulmonic valve with normal leaflet excursion. There is trace pulmonic regurgitation.     Aorta:  The aortic arch is not well visualized. The aortic root at the sinuses of Valsalva is normal in size, measuring 3.00 cm (indexed 1.46 cm/m²). The ascending aorta is normal in size.     Pericardium:  No pericardial effusion seen.     Pleura:  Left pleural effusion noted.     Systemic Veins:  The inferior vena cava is normal in size measuring 2.10 cm in diameter, (normal <2.1cm) with normal inspiratory collapse (normal >50%) consistent with normal right atrial pressure (~3, range 0-5mmHg).  ____________________________________________________________________  QUANTITATIVE DATA:  Left Ventricle Measurements: (Indexed to BSA)     IVSd (2D):   0.9 cm  LVPWd (2D):  0.9 cm  LVIDd (2D):  5.5 cm  LVIDs (2D):  4.7 cm  LV Mass:     183 g  88.9 g/m²  Visualized LV EF%: 40 to 45%     MV E Vmax:    1.37 m/s  MV A Vmax:    1.06 m/s  MV E/A:       1.29  e' lateral:   7.51 cm/s  e' medial:    7.29 cm/s  E/e' lateral: 18.24  E/e' medial:  18.79  E/e' Average: 18.51  MV DT:        118 msec    Aorta Measurements: (Normal range) (Indexed to BSA)     Ao Root d     3.00 cm (3.1 - 3.7 cm) 1.46 cm/m²  Ao Asc d, 2D: 2.50  Ao Asc prox:  2.50 cm                1.21 cm/m²            Left Atrium Measurements: (Indexed to BSA)  LA Diam 2D: 4.40 cm         Right Ventricle Measurements: Right Atrial Measurements:     TAPSE:            2.0 cm      RA Vol s, MOD A4C         41.0 ml  RV S' Vmax:       11.90 cm/s  RA Vol s, MOD A4C i BSA   19.92 ml/m²  RV Base (RVID1):  3.0 cm      RA Area s, MOD A4C        16.8 cm²  RV Mid (RVID2):   2.6 cm      RA Area s, MOD A4C, i BSA 8.16 cm²/m²  RV Major (RVID3): 5.1 cm       LVOT / RVOT/ Qp/Qs Data: (Indexed to BSA)  LVOT Vmax:      0.87 m/s  LVOT Vmn:       0.590 m/s  LVOT VTI:       18.90 cm  LVOT peak grad: 3 mmHg  LVOT mean grad: 1.5 mmHg    Aortic Valve Measurements:  AV Vmax:                3.5 m/s  AV Peak Gradient:       49.3 mmHg  AV Mean Gradient:       26.0 mmHg  AV VTI:                 75.3 cm  AV VTI Ratio:           0.25  AoV Dimensionless Index 0.25    Mitral Valve Measurements:     MV E Vmax: 1.4 m/s  MV A Vmax: 1.1 m/s  MV E/A:    1.3       Tricuspid Valve Measurements:     TR Vmax:          3.1 m/s  TR Peak Gradient: 38.7 mmHg  RA Pressure:      3 mmHg  PASP:             42 mmHg

## 2025-06-06 NOTE — PROGRESS NOTE ADULT - SUBJECTIVE AND OBJECTIVE BOX
NEPHROLOGY     Patient seen and examined resting, breathing comfortably on 4L NC, reports breathing improving though complains of right knee pain, in no acute distress.     MEDICATIONS  (STANDING):  allopurinol 200 milliGRAM(s) Oral daily  apixaban      apixaban 2.5 milliGRAM(s) Oral two times a day  bumetanide IVPB 3 milliGRAM(s) IV Intermittent two times a day  chlorhexidine 2% Cloths 1 Application(s) Topical <User Schedule>  clopidogrel Tablet 75 milliGRAM(s) Oral daily  gabapentin 300 milliGRAM(s) Oral two times a day  hydrALAZINE 37.5 milliGRAM(s) Oral three times a day  isosorbide   dinitrate Tablet (ISORDIL) 10 milliGRAM(s) Oral three times a day  levoFLOXacin  Tablet 250 milliGRAM(s) Oral every 24 hours  pantoprazole    Tablet 40 milliGRAM(s) Oral before breakfast  rosuvastatin 20 milliGRAM(s) Oral at bedtime    VITALS:  T(C): , Max: 37.2 (06-05-25 @ 16:00)  T(F): , Max: 99 (06-05-25 @ 16:00)  HR: 79 (06-06-25 @ 08:00)  BP: 114/66 (06-06-25 @ 08:00)  BP(mean): 76 (06-06-25 @ 08:00)  RR: 17 (06-06-25 @ 08:00)  SpO2: 96% (06-06-25 @ 08:00)    I and O's:    06-05 @ 07:01  -  06-06 @ 07:00  --------------------------------------------------------  IN: 1030 mL / OUT: 3820 mL / NET: -2790 mL    PHYSICAL EXAM:  Constitutional: NAD  HEENT: EOMI  Neck:  No JVD, supple   Respiratory: diminished   Cardiovascular: RRR  Gastrointestinal: + BS, soft, NT, ND  Extremities:tr peripheral edema, + peripheral pulses  Neurological: A/O x 3, CN2-12 intact  Psychiatric: Normal mood, normal affect  : + Min  Skin: No rashes, C/D/I    LABS:                        8.6    2.39  )-----------( 58       ( 06 Jun 2025 05:57 )             25.8     06-06    147[H]  |  103  |  50[H]  ----------------------------<  148[H]  3.6   |  26  |  2.97[H]    Ca    8.3[L]      06 Jun 2025 05:57  Phos  4.4     06-06  Mg     2.30     06-06    TPro  6.7  /  Alb  3.9  /  TBili  0.8  /  DBili  x   /  AST  14  /  ALT  7   /  AlkPhos  93  06-06      85 Y/O M PMH MDS, HTN, CKD 3, A Fib, Anemia, HTN, Prostate CA, gout, obesity, presents with 3 days of orthopnea, increasing shortness of breath and weight gain  CHF   CKD stage 4   Anemia, thrombocytopenia, leukopenia - Pancytopenia   Severe AS   Hypernatremia     1 Renal- renal fxn trending higher, now off Bumex gtt at 2mg/hr and on Bumex 3mg IV bid   Trend serum creatinine and strict ins and outs   A/w KCl 40 mEq IV x 1;  Encourage po intake   2 CVS- BP stable, now off nitro gtt and not on BBlockers at present; on Hydralazine 37.5mg po tid and Isordil 10 mg po tid   HF following   Right and left heart cath likely on Monday   3 Pulm- now off bipap and on nasal cannula   May need CT of the chest to assess the effusions and see if he would benefit from thoracentesis   4 Heme - h/h stable, s/p PRBC        NEPHROLOGY     Patient seen and examined resting, breathing comfortably on 4L NC, reports breathing improving though complains of right knee pain, in no acute distress.     MEDICATIONS  (STANDING):  allopurinol 200 milliGRAM(s) Oral daily  apixaban      apixaban 2.5 milliGRAM(s) Oral two times a day  bumetanide IVPB 3 milliGRAM(s) IV Intermittent two times a day  chlorhexidine 2% Cloths 1 Application(s) Topical <User Schedule>  clopidogrel Tablet 75 milliGRAM(s) Oral daily  gabapentin 300 milliGRAM(s) Oral two times a day  hydrALAZINE 37.5 milliGRAM(s) Oral three times a day  isosorbide   dinitrate Tablet (ISORDIL) 10 milliGRAM(s) Oral three times a day  levoFLOXacin  Tablet 250 milliGRAM(s) Oral every 24 hours  pantoprazole    Tablet 40 milliGRAM(s) Oral before breakfast  rosuvastatin 20 milliGRAM(s) Oral at bedtime    VITALS:  T(C): , Max: 37.2 (06-05-25 @ 16:00)  T(F): , Max: 99 (06-05-25 @ 16:00)  HR: 79 (06-06-25 @ 08:00)  BP: 114/66 (06-06-25 @ 08:00)  BP(mean): 76 (06-06-25 @ 08:00)  RR: 17 (06-06-25 @ 08:00)  SpO2: 96% (06-06-25 @ 08:00)    I and O's:    06-05 @ 07:01  -  06-06 @ 07:00  --------------------------------------------------------  IN: 1030 mL / OUT: 3820 mL / NET: -2790 mL    PHYSICAL EXAM:  Constitutional: NAD  HEENT: EOMI  Neck:  No JVD, supple   Respiratory: diminished   Cardiovascular: RRR  Gastrointestinal: + BS, soft, NT, ND  Extremities:tr peripheral edema, + peripheral pulses  Neurological: A/O x 3, CN2-12 intact  Psychiatric: Normal mood, normal affect  : + Min  Skin: No rashes, C/D/I    LABS:                        8.6    2.39  )-----------( 58       ( 06 Jun 2025 05:57 )             25.8     06-06    147[H]  |  103  |  50[H]  ----------------------------<  148[H]  3.6   |  26  |  2.97[H]    Ca    8.3[L]      06 Jun 2025 05:57  Phos  4.4     06-06  Mg     2.30     06-06    TPro  6.7  /  Alb  3.9  /  TBili  0.8  /  DBili  x   /  AST  14  /  ALT  7   /  AlkPhos  93  06-06

## 2025-06-06 NOTE — CHART NOTE - NSCHARTNOTEFT_GEN_A_CORE
Transfer Note    84M with PMH CKD 3, A Fib, Anemia, HTN, Prostate CA, chronic UTI, gout, obesity, renal artery stenosis s/p L renal stent, remote hx of prostate cancer, and MDS s/p revlimid (ld 8/10/23), lenalidomide, and currently on C17 decitabine (ld 5/24/25). P/w 3 days of orthopnea, increasing shortness of breath and weight gain concerning for heart failure exacerbation. Noted to by hypoxic to the 80s in ED, placed on bipap. Now on bumex gtt per HF. Nephro following for JUAN RAMON.    Interval CCU events: Pt diuresed well in CCU on Bumex GTT and transitioned to IVPB. Off continuos BIPAP and currently on 4L NC. Started on PO GDMT and tolerating well in CCU. TTE w EF 40 to 45%. There are regional wall motion abnormalities present. Hypokinesis of the inferior and inferolateral walls.    Sigout given to   [facility] Dr Acevedo, Morales GOLDBERG    [ ] Pending LHC and RHC once Cr improves and likely Monday.   [ ] Hold Eliquis Sunday night.   [ ] HF followup  [ ] PT eval once euvolemic Transfer Note    84M with PMH CKD 3, A Fib, Anemia, HTN, Prostate CA, chronic UTI, gout, obesity, renal artery stenosis s/p L renal stent, remote hx of prostate cancer, and MDS s/p revlimid (ld 8/10/23), lenalidomide, and currently on C17 decitabine (ld 5/24/25). P/w 3 days of orthopnea, increasing shortness of breath and weight gain concerning for heart failure exacerbation. Noted to by hypoxic to the 80s in ED, placed on bipap. Now on bumex gtt per HF. Nephro following for JUAN RAMON.    Interval CCU events: Pt diuresed well in CCU on Bumex GTT and transitioned to IVPB. Off continuos BIPAP and currently on 4L NC. Off Nitro GTT and Started on PO GDMT and tolerating well in CCU. TTE w EF 40 to 45%. There are regional wall motion abnormalities present. Hypokinesis of the inferior and inferolateral walls.    Sigout given to   [facility] Dr Acevedo, [place]ales GOL[place]ERG    [ ] Pending LHC and RHC once Cr improves and likely Monday.   [ ] Hold Eliquis Sunday night.   [ ] HF followup  [ ] PT eval once euvolemic

## 2025-06-06 NOTE — PROGRESS NOTE ADULT - NS ATTEND AMEND GEN_ALL_CORE FT
A/w acute hypoxemic resp failure requiring BiPAP 100%, with O2 desat to 70% off O2.  Now in CCU. Off Bumex and NTG drips.  Good diuresis. SCr up to ~3.0.  Repeat CXR.  Bumex IVP to be started.  Increase hydralazine to 50 mg po tid.  Possible R/LHC on Monday. A/w acute hypoxemic resp failure requiring BiPAP 100%, with O2 desat to 70% off O2.  Now in CCU. Off Bumex and NTG drips.  Good diuresis. SCr up to ~3.0.  Repeat CXR.  Bumex IVP to be started.  Increase hydralazine to 50 mg po tid.  Possible R/LHC on Monday.    Addendum:  Given gouty flare and signs of intravascular volume depletion, consider holding Bumex for 1 day.  Would wait on any IV contrast studies until SCr closer to baseline.

## 2025-06-06 NOTE — PROGRESS NOTE ADULT - SUBJECTIVE AND OBJECTIVE BOX
Patient seen and examined at bedside. HDS.  Reporting LLE knee pain, currently denies any anginal symptoms of Pt currently denies CP, SOB, palpitations    - No events on telemetry overnight, remains in sinus rhythm      PERTINENT REVIEW OF SYSTEMS:  Constitutional:     [ ] negative [ ] fevers [ ] chills [ ] weight loss [ ] weight gain  CV:                         [ ] negative  [ ] chest pain [ ] orthopnea [ ] palpitations [ ] murmur  Resp:                     [ ] negative [ ] cough [ ] shortness of breath [ ] dyspnea [ ] wheezing [ ] sputum [ ]hemoptysis  GI:                          [ ] negative [ ] nausea [ ] vomiting [ ] diarrhea [ ] constipation [ ] abd pain [ ] dysphagia   Skin:                       [ ] negative [ ] rash [ ] itch  Neurological:        [ ] negative [ ] headache [ ] dizziness [ ] syncope [ ] weakness [ ] numbness  Psychiatric:           [ ] negative [ ] anxiety [ ] depression  Endocrine:            [ ] negative [ ] diabetes [ ] thyroid problem  Heme/Lymph:      [ ] negative [ ] anemia [ ] bleeding problem  Allergic/Immune: [ ] negative [ ] itchy eyes [ ] nasal discharge [ ] hives [ ] angioedema    [x] All other systems negative  [ ] Unable to assess ROS due to    Home Medications  Home Medications:  allopurinol 100 mg oral tablet: 2 tab(s) orally once a day (03 Jun 2025 01:31)  amLODIPine 5 mg oral tablet: 1 tab(s) orally once a day (03 Jun 2025 01:31)  aspirin 81 mg oral capsule: 1 cap(s) orally once a day (03 Jun 2025 01:31)  gabapentin 300 mg oral capsule: 1 cap(s) orally 3 times a day (04 Jun 2025 15:46)  levoFLOXacin 500 mg oral tablet: 1 tab(s) orally once a day (04 Jun 2025 15:47)  omeprazole 20 mg oral delayed release tablet: 1 tab(s) orally once a day (03 Jun 2025 01:31)  Plavix 75 mg oral tablet: 1 tab(s) orally once a day (03 Jun 2025 01:31)  rosuvastatin 10 mg oral tablet: 1 tab(s) orally once a day (03 Jun 2025 01:31)    Current Meds:  allopurinol 200 milliGRAM(s) Oral daily  apixaban      apixaban 2.5 milliGRAM(s) Oral two times a day  chlorhexidine 2% Cloths 1 Application(s) Topical <User Schedule>  clopidogrel Tablet 75 milliGRAM(s) Oral daily  gabapentin 300 milliGRAM(s) Oral two times a day  hydrALAZINE 37.5 milliGRAM(s) Oral three times a day  isosorbide   dinitrate Tablet (ISORDIL) 10 milliGRAM(s) Oral three times a day  levoFLOXacin  Tablet 250 milliGRAM(s) Oral every 24 hours  melatonin 3 milliGRAM(s) Oral at bedtime PRN  pantoprazole    Tablet 40 milliGRAM(s) Oral before breakfast  predniSONE   Tablet 40 milliGRAM(s) Oral daily  rosuvastatin 20 milliGRAM(s) Oral at bedtime    PAST MEDICAL & SURGICAL HISTORY:  HTN - Hypertension      Hypercholesterolemia      PC (prostate cancer)  s/p surgical resection 3 years ago      Gout      Aneurysm, ascending aorta      H/O prostatectomy      H/O carotid endarterectomy      H/O renal artery stenosis  s/p stent in Left RA      Status post cataract extraction, unspecified laterality    Vitals:  T(F): 97.7 (06-06), Max: 99 (06-05)  HR: 88 (06-06) (79 - 115)  BP: 92/68 (06-06) (92/68 - 130/80)  RR: 21 (06-06)  SpO2: 96% (06-06)  I&O's Summary    05 Jun 2025 07:01  -  06 Jun 2025 07:00  --------------------------------------------------------  IN: 1035 mL / OUT: 3820 mL / NET: -2785 mL    06 Jun 2025 07:01  -  06 Jun 2025 12:31  --------------------------------------------------------  IN: 300 mL / OUT: 900 mL / NET: -600 mL    Physical Exam:  Appearance: No acute distress; well appearing  Neck: Supple, no JVD B/L, no Carotid Bruit B/L  Cardiovascular: RRR, S1, S2, no murmurs, rubs, or gallops, no edema  Respiratory: Clear to auscultation bilaterally, no RRW B/L  Gastrointestinal: soft, non-tender, non-distended with normal bowel sounds  Neurologic: No focal or neuro deficits noted  Psychiatry: AAOx3, mood & affect appropriate  Extremities:   No LE swelling bilaterally, palpable pulses throughout                        8.6    2.39  )-----------( 58       ( 06 Jun 2025 05:57 )             25.8     06-06    147[H]  |  103  |  50[H]  ----------------------------<  148[H]  3.6   |  26  |  2.97[H]    Ca    8.3[L]      06 Jun 2025 05:57  Phos  4.4     06-06  Mg     2.30     06-06    TPro  6.7  /  Alb  3.9  /  TBili  0.8  /  DBili  x   /  AST  14  /  ALT  7   /  AlkPhos  93  06-06    Cardiac Imaging  ---------------------  < from: TTE W or WO Ultrasound Enhancing Agent (06.04.25 @ 06:31) >    CONCLUSIONS:      1. The left ventricular cavity is normal in size. Left ventricular wall thickness is normal. Left ventricular systolic function is mildly to moderately decreased with an ejection fraction visually estimated at 40 to 45%. There are regional wall motion abnormalities present. Hypokinesis of the inferior and inferolateral walls.   2. Normal right ventricular cavity size and probably normal right ventricular systolic function. Tricuspid annular plane systolic excursion (TAPSE) is 2.0 cm (normal >=1.7 cm).   3. Structurally normal mitral valve with normal leaflet excursion. There is calcification of the mitral valve annulus. There is mild leaflet calcification. There is mild to moderatemitral regurgitation.   4. The aortic valve anatomy cannot be determined. There is calcification of the aortic valve leaflets. The peak transaortic velocity is 3.51 m/s, peak transaortic gradient is 49.3 mmHg and mean transaortic gradient is 26.0 mmHg with an LVOT/aortic valve VTI ratio of 0.25. The gross appearance of the aortic valve is consistent with significant aortic stenosis. However, unable to accurately estimate the aortic valve area. There is mild aortic regurgitation.   5. The left atrium is moderately dilated with an indexed volume of 45.18 ml/m².   6. The tricuspid valve is structurally normal with normal leaflet excursion. There is mild tricuspid regurgitation. Estimated pulmonary artery systolic pressure is 42 mmHg, consistent with mild pulmonary hypertension.   7. Left pleural effusion noted.   8. No prior echocardiogram is available for comparison.    ____________________________________________________________________  Recommendations:  Consider JEAN for further evaluation of the aortic valve, if clinically indicated.     ==========================================================    < from: TTE Limited W or WO Ultrasound Enhancing Agent (12.23.24 @ 09:44) >     CONCLUSIONS:      1. No echocardiographic evidence of vegetations.   2. Moderate mitral regurgitation.   3. Mild aortic regurgitation.   4. Trace pulmonic regurgitation.   5. Trace tricuspid regurgitation.   6. Trileaflet aortic valve with reduced systolic excursion. There is calcification of the aortic valve leaflets.   7. Compared to the transthoracic echocardiogram performed on 6/11/2024, there have been no significant interval changes.    =================================================================    Telemetry:  Sinus Rhythm

## 2025-06-06 NOTE — DISCHARGE NOTE PROVIDER - CARE PROVIDER_API CALL
Zita Mcmahon  Hematology/Oncology  [address] [place] [address]  Phone: [phone]  Fax: [phone]  Follow Up Time: 1 week

## 2025-06-06 NOTE — DISCHARGE NOTE PROVIDER - NSDCFUSCHEDAPPT_GEN_ALL_CORE_FT
Gabriel Reddy Physician [facility]  CARDIOLOGY [address]  Scheduled Appointment: 08/27/2025

## 2025-06-06 NOTE — DISCHARGE NOTE PROVIDER - DISCHARGE SERVICE FOR PATIENT
Err   on the discharge service for the patient. I have reviewed and made amendments to the documentation where necessary.

## 2025-06-06 NOTE — PROGRESS NOTE ADULT - ASSESSMENT
Is Cyclosporine Contraindicated?: No Tremfya Dosing: 100 mg SC week 0 and week 4 then every 8 weeks thereafter Pregnancy And Lactation Warning Text: The risk during pregnancy and breastfeeding is uncertain with this medication.   83 Y/O M PMH MDS, HTN, CKD 3, A Fib, Anemia, HTN, Prostate CA, gout, obesity, presents with 3 days of orthopnea, increasing shortness of breath and weight gain  CHF   CKD stage 4   Anemia, thrombocytopenia, leukopenia - Pancytopenia   Severe AS   Hypernatremia     1 Renal- renal fxn trending higher, now off Bumex ;  Allow oral hydration(this will not lead to chf)   Trend serum creatinine and strict ins and outs   A/w KCl 40 mEq IV x 1;  Encourage po intake   2 CVS- BP stable, now off nitro gtt and not on BBlockers at present; on Hydralazine 37.5mg po tid and Isordil 10 mg po tid   HF following   Right and left heart cath likely on Monday - No renal objection   3 Pulm- now off bipap and on nasal cannula   May need CT of the chest to assess the effusions and see if he would benefit from thoracentesis   4 Heme - h/h stable, s/p PRBC     DW CCU      Sayed [facility]   [phone]    Diagnosis (Required): Psoriasis Tremfya Monitoring Guidelines: A yearly test for tuberculosis is required while taking Tremfya. Detail Level: Zone

## 2025-06-06 NOTE — PROGRESS NOTE ADULT - PROBLEM SELECTOR PLAN 1
IV Bumex  3mg twice daily; Please discontinue   Nitroglycerin drip discontinued   Hydralazine 37.5mg Q8H (hold SBP<100)  ISDN 10mg Q8H (hold SBP<100)  Strict I/O  Daily Standing Weights  Monitor Lytes Replete K>4.0andMg>2.0  Trend SCr  TTE revealed severe AS; Plan for TAVR evaluation this admission  R/C this admission   Appreciate Nephrology recommendations   Appreciate Heme recommendations   Management per CCU team  Pending Final Recommendations from HF Attending IV Bumex  3mg twice daily; Please discontinue now  IV hydration 50cc/hr X 5hours    Nitroglycerin drip discontinued   Hydralazine 37.5mg Q8H (hold SBP<100)  ISDN 10mg Q8H (hold SBP<100)  Strict I/O  Daily Standing Weights  Monitor Lytes Replete K>4.0andMg>2.0  Trend SCr  TTE revealed severe AS; Plan for TAVR evaluation this admission  R/LHC this admission   Appreciate Nephrology recommendations   Appreciate Heme recommendations   Management per CCU team  Pending Final Recommendations from HF Attending IV Bumex  3mg twice daily; Please discontinue now  IV hydration 50cc/hr X 5hours    Nitroglycerin drip discontinued   Hydralazine 37.5mg Q8H (hold SBP<100)  ISDN 10mg Q8H (hold SBP<100)  Strict I/O  Daily Standing Weights  Monitor Lytes Replete K>4.0andMg>2.0  Trend SCr  TTE revealed severe AS; Plan for TAVR evaluation this admission  R/LHC on Monday, 6/9 pending SCr    Appreciate Nephrology recommendations   Appreciate Heme recommendations   Management per CCU team  Pending Final Recommendations from HF Attending

## 2025-06-06 NOTE — PROGRESS NOTE ADULT - ASSESSMENT
85 y/o male with PMHx of CAD (LHC 3/18/21 mild LAD 60%, OM1 60%, Prox  ), HFrEF (JEAN 12/26/24 LVEF 35%, trace TR, MR), afib, anemia, renal artery stenosis s/p L renal stent, HTN, prstate CA/ MDS currently on chemotherapy via PICC line, last dose was in mid April.  Pt comes in with complaints of SOB and weight gain. He was found to be hypoxic in ED with O2 sat 80s, was placed on BIPAP. HF consult called on 6/3/25 to help manage care in this patient who is suspected to be in ADHF. Labs show proBNP 6482, lactate 2.5 now improved to 1.5, BUN/Cr 41/2.55, H/H 8.3/25, trop 171/166. CXR shows b/l pleural effusions. Pt still on BIPAP, pt's aide helped answer questions. Removing BIPAP sat dropped to 71%. Pt lives alone with live in aide. He admits to eating very salty meals- frozen dinners frequently. He is compliant with taking all HF medications, given to him by his aide. Overall stage C HF, NYHA class IV-severely hypervolemic and hypoxic requiring BIPAP.    Neuro:  -A&Ox3     Resp:  #Acute Hypoxic Respiratory Failure on BIPAP  -ABG this am 7.49/37/150/28/99%  -BIPAP setting 12/6 FIO2 40%  -Will titrate the BIPAP off as he diureses and try to transition to nasal cannula  -Maintain SPO2 >90    CARDIAC:  #Acute systolic heart failure   #Aortic Stenosis  -ECHO reveals significant Aortic stenosis  -Left ventricular systolic function is mildly to moderately decreased with an ejection fraction visually estimated at 40 to 45%. There are regional wall motion abnormalities present. Hypokinesis of the inferior and inferolateral walls.  The aortic valve anatomy cannot be determined. There iscalcification of the aortic valve leaflets. The peak transaortic velocity is 3.51 m/s, peak transaortic gradient is 49.3 mmHg and mean transaortic gradient is 26.0 mmHg with an LVOT/aortic valve VTI ratio of 0.25. The gross appearance of the aortic valve is consistent with significant aortic stenosis. However, unable to accurately estimate the aortic valve area. There is mild aortic regurgitation.  -Volume Overloaded on Exam  -Will need eventual JEAN once euvolemic and stabilized  -D/c Bumex gtt at 1mg/hr  -OFF Nitro 50mcg gtt 6/4  -Continue hydralazine 37.5 TID  -Add Isordil 10mg TID  -Strict intake and output  -Daily weight  -Likely need an ischemic evaluation and TAVER evaluation (?JEAN/ LHC/RHC once optimized)  -IN: 1030 mL / OUT: 3820 mL / NET: -2790 mL      #CAD:  -Left heart Cath is 2021  -Findings LM normal, LAD 60%, CX -OM1 with 60%, %   -Discontinued aspirin, (for Eliquis and continue Plavix (USHA)      #NSVT  -Maintain K>4, mag>2    #Paroxysmal Atrial Fibrillation   -CHADSVASC is 7  -Patient does not take Eliquis at home, while in the CCU  -EP consulted, patient reports "all the doctors say I probably have afib"  -Eliquis renally dosed 2.5 bid  -Placed on Eliquis 2.5 bid  -Holding BB for now in setting of heart failure    GI:  -Abdominal distension in setting of SOB and belly breathing  -NPO while on the BIPAP    :  #Acute Kidney Injury superimposed on CKD stage 3  -Baseline Scr ~2  -Renal artery stenosis s/p stent  -Continue Plavix for USHA stent  -Continue diuresis    #Urinary retention:  -Bladder scan revealed >600  -indwelling catheter placed    HEME/ONC:  #Myelodysplastic Syndrome  -History of MDS, last chemo may 20th  -Bone marrow that reported as 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; 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 Gabriel Reddy  -Continue plavix  -Continue gabapentin 300tid    ID:   -Patient takes levofloxacin for neutropenia  -UA, RVP, BC neg  -UCx 10,000 - 49,000 CFU/mL Enterococcus faecalis     DVT PPx:  -On Eliquis initiated    LINES:  LEFT FOREARM PICC LINE FOR CHEMO (came to hospital with the PICC Line and it gets serviced/dressing changed every Friday)     85 y/o male with PMHx of CAD (LHC 3/18/21 mild LAD 60%, OM1 60%, Prox  ), HFrEF (JEAN 12/26/24 LVEF 35%, trace TR, MR), afib, anemia, renal artery stenosis s/p L renal stent, HTN, prstate CA/ MDS currently on chemotherapy via PICC line, last dose was in mid April.  Pt comes in with complaints of SOB and weight gain. He was found to be hypoxic in ED with O2 sat 80s, was placed on BIPAP. HF consult called on 6/3/25 to help manage care in this patient who is suspected to be in ADHF. Labs show proBNP 6482, lactate 2.5 now improved to 1.5, BUN/Cr 41/2.55, H/H 8.3/25, trop 171/166. CXR shows b/l pleural effusions. Pt still on BIPAP, pt's aide helped answer questions. Removing BIPAP sat dropped to 71%. Pt lives alone with live in aide. He admits to eating very salty meals- frozen dinners frequently. He is compliant with taking all HF medications, given to him by his aide. Overall stage C HF, NYHA class IV-severely hypervolemic and hypoxic requiring BIPAP.    Neuro:  -A&Ox3     Resp:  #Acute Hypoxic Respiratory Failure on BIPAP  -ABG this am 7.49/37/150/28/99%  -BIPAP setting 12/6 FIO2 40%  -Will titrate the BIPAP off as he diureses and try to transition to nasal cannula  -Maintain SPO2 >90    CARDIAC:  #Acute systolic heart failure   #Aortic Stenosis  -ECHO reveals significant Aortic stenosis  -Left ventricular systolic function is mildly to moderately decreased with an ejection fraction visually estimated at 40 to 45%. There are regional wall motion abnormalities present. Hypokinesis of the inferior and inferolateral walls.  The aortic valve anatomy cannot be determined. There iscalcification of the aortic valve leaflets. The peak transaortic velocity is 3.51 m/s, peak transaortic gradient is 49.3 mmHg and mean transaortic gradient is 26.0 mmHg with an LVOT/aortic valve VTI ratio of 0.25. The gross appearance of the aortic valve is consistent with significant aortic stenosis. However, unable to accurately estimate the aortic valve area. There is mild aortic regurgitation.  -Volume Overloaded on Exam  -R/LHC Monday   -D/c Bumex gtt at 1mg/hr 6/6  -Started Bumex 3mg IVBP BID   -OFF Nitro 50mcg gtt 6/4  -Continue hydralazine 37.5 TID  -Add Isordil 10mg TID  -Strict intake and output  -Daily weight  -Likely need an ischemic evaluation and TAVER evaluation (?JEAN/ LHC/RHC once optimized)  -IN: 1030 mL / OUT: 3820 mL / NET: -2790 mL      #CAD:  -Left heart Cath is 2021  -Findings LM normal, LAD 60%, CX -OM1 with 60%, %   -Discontinued aspirin, (for Eliquis and continue Plavix (USHA)      #NSVT  -Maintain K>4, mag>2    #Paroxysmal Atrial Fibrillation   -CHADSVASC is 7  -Patient does not take Eliquis at home, while in the CCU  -EP consulted, patient reports "all the doctors say I probably have afib"  -Eliquis renally dosed 2.5 bid  -Placed on Eliquis 2.5 bid  -Holding BB for now in setting of heart failure    GI:  -Abdominal distension in setting of SOB and belly breathing  -NPO while on the BIPAP    :  #Acute Kidney Injury superimposed on CKD stage 3  -Baseline Scr ~2  -Renal artery stenosis s/p stent  -Continue Plavix for USHA stent  -Continue diuresis    #Urinary retention:  -Bladder scan revealed >600  -indwelling catheter placed    HEME/ONC:  #Myelodysplastic Syndrome  -History of MDS, last chemo may 20th  -Bone marrow that reported as 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; 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 Gabriel Reddy  -Continue plavix  -Continue gabapentin 300tid    ID:   -Patient takes levofloxacin for neutropenia  -UA, RVP, BC neg  -UCx 10,000 - 49,000 CFU/mL Enterococcus faecalis   -No ABX     DVT PPx:  -On Eliquis initiated    LINES:  LEFT FOREARM PICC LINE FOR CHEMO (came to hospital with the PICC Line and it gets serviced/dressing changed every Friday)     83 y/o male with PMHx of CAD (LHC 3/18/21 mild LAD 60%, OM1 60%, Prox  ), HFrEF (JEAN 12/26/24 LVEF 35%, trace TR, MR), afib, anemia, renal artery stenosis s/p L renal stent, HTN, prstate CA/ MDS currently on chemotherapy via PICC line, last dose was in mid April.  Pt comes in with complaints of SOB and weight gain. He was found to be hypoxic in ED with O2 sat 80s, was placed on BIPAP. HF consult called on 6/3/25 to help manage care in this patient who is suspected to be in ADHF. Labs show proBNP 6482, lactate 2.5 now improved to 1.5, BUN/Cr 41/2.55, H/H 8.3/25, trop 171/166. CXR shows b/l pleural effusions. Pt still on BIPAP, pt's aide helped answer questions. Removing BIPAP sat dropped to 71%. Pt lives alone with live in aide. He admits to eating very salty meals- frozen dinners frequently. He is compliant with taking all HF medications, given to him by his aide. Overall stage C HF, NYHA class IV-severely hypervolemic and hypoxic requiring BIPAP.    Neuro:  -A&Ox3     Resp:  #Acute Hypoxic Respiratory Failure on BIPAP  -ABG this am 7.49/37/150/28/99%  -BIPAP setting 12/6 FIO2 40%  -Will titrate the BIPAP off as he diureses and try to transition to nasal cannula  -Maintain SPO2 >90    CARDIAC:  #Acute systolic heart failure   #Aortic Stenosis  -ECHO reveals significant Aortic stenosis  -Left ventricular systolic function is mildly to moderately decreased with an ejection fraction visually estimated at 40 to 45%. There are regional wall motion abnormalities present. Hypokinesis of the inferior and inferolateral walls.  The aortic valve anatomy cannot be determined. There iscalcification of the aortic valve leaflets. The peak transaortic velocity is 3.51 m/s, peak transaortic gradient is 49.3 mmHg and mean transaortic gradient is 26.0 mmHg with an LVOT/aortic valve VTI ratio of 0.25. The gross appearance of the aortic valve is consistent with significant aortic stenosis. However, unable to accurately estimate the aortic valve area. There is mild aortic regurgitation.  -Volume Overloaded on Exam  -R/LHC Monday   -D/c Bumex gtt at 1mg/hr 6/6  -Started Bumex 3mg IVBP BID   -OFF Nitro 50mcg gtt 6/4  -Continue hydralazine 37.5 TID  -Add Isordil 10mg TID  -Strict intake and output  -Daily weight  -Likely need an ischemic evaluation and TAVER evaluation (?JEAN/ LHC/RHC once optimized)  -IN: 1030 mL / OUT: 3820 mL / NET: -2790 mL      #CAD:  -Left heart Cath is 2021  -Findings LM normal, LAD 60%, CX -OM1 with 60%, %   -Discontinued aspirin, (for Eliquis and continue Plavix (USHA)      #NSVT  -Maintain K>4, mag>2    #Paroxysmal Atrial Fibrillation   -CHADSVASC is 7  -Patient does not take Eliquis at home, while in the CCU  -EP consulted, patient reports "all the doctors say I probably have afib"  -Eliquis renally dosed 2.5 bid  -Placed on Eliquis 2.5 bid  -Holding BB for now in setting of heart failure    GI:  -Abdominal distension in setting of SOB and belly breathing  -Resume diet    :  #Acute Kidney Injury superimposed on CKD stage 3  -Baseline Scr ~2  -Renal artery stenosis s/p stent  -Continue Plavix for USHA stent  -Continue diuresis    #Urinary retention:  -Bladder scan revealed >600  -indwelling catheter placed    HEME/ONC:  #Myelodysplastic Syndrome  -History of MDS, last chemo may 20th  -Bone marrow that reported as 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; 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 Gabriel Reddy  -Continue plavix  -Continue gabapentin 300tid    ID:   -Patient takes levofloxacin for neutropenia  -UA, RVP, BC neg  -UCx 10,000 - 49,000 CFU/mL Enterococcus faecalis   -No ABX     DVT PPx:  -On Eliquis initiated    LINES:  LEFT FOREARM PICC LINE FOR CHEMO (came to hospital with the PICC Line and it gets serviced/dressing changed every Friday)

## 2025-06-06 NOTE — DISCHARGE NOTE PROVIDER - HOSPITAL COURSE
84M with PMH CKD 3, A Fib, Anemia, HTN, Prostate CA, chronic UTI, gout, obesity, renal artery stenosis s/p L renal stent, remote hx of prostate cancer, and MDS s/p revlimid (ld 8/10/23), lenalidomide, and currently on C17 decitabine (ld 5/24/25). P/w 3 days of orthopnea, increasing shortness of breath and weight gain concerning for heart failure exacerbation. Noted to by hypoxic to the 80s in ED, placed on bipap. Now on bumex gtt per HF. Nephro following for JUAN RAMON.    Interval CCU events: Pt diuresed well in CCU on Bumex GTT and transitioned to IVPB. Off BIPAP for >24h and currently on 4L NC. Started on PO GDMT and tolerating well in CCU. TTE w EF 40 to 45%. There are regional wall motion abnormalities present. Hypokinesis of the inferior and inferolateral walls.      84M with PMH CKD 3, A Fib, Anemia, HTN, Prostate CA, chronic UTI, gout, obesity, renal artery stenosis s/p L renal stent, remote hx of prostate cancer, and MDS s/p revlimid (ld 8/10/23), lenalidomide, and currently on C17 decitabine (ld 5/24/25). P/w 3 days of orthopnea, increasing shortness of breath and weight gain concerning for heart failure exacerbation. Noted to by hypoxic to the 80s in ED, placed on bipap. Now on bumex gtt per HF. Nephro following for JUAN RAMON.    Interval CCU events: Pt diuresed well in CCU on Bumex GTT and transitioned to IVPB. Off continous BIPAP and currently on 4L NC. Started on PO GDMT and tolerating well in CCU. TTE w EF 40 to 45%. There are regional wall motion abnormalities present. Hypokinesis of the inferior and inferolateral walls.      84M with PMH CKD 3, A Fib, Anemia, HTN, Prostate CA, chronic UTI, gout, obesity, renal artery stenosis s/p L renal stent, remote hx of prostate cancer, and MDS s/p revlimid (ld 8/10/23), lenalidomide, and currently on C17 decitabine (ld 5/24/25). P/w 3 days of orthopnea, increasing shortness of breath and weight gain concerning for heart failure exacerbation. Noted to by hypoxic to the 80s in ED, placed on bipap. Now on bumex gtt per HF. Nephro following for JUAN RAMON.    Interval CCU events: Pt diuresed well in CCU on Bumex GTT and transitioned to IVPB. Off continous BIPAP and currently on 4L NC. Off Nitro GTT and Started on PO GDMT and tolerating well in CCU. TTE w EF 40 to 45%. There are regional wall motion abnormalities present. Hypokinesis of the inferior and inferolateral walls.      84M with PMH CKD 3, A Fib, Anemia, HTN, chronic UTI, gout, obesity, renal artery stenosis s/p L renal stent on plavix, remote hx of prostate cancer, and MDS s/p revlimid (ld 8/10/23), lenalidomide, and currently on C17 decitabine (ld 5/24/25). P/w 3 days of orthopnea, increasing shortness of breath and weight gain concerning for heart failure exacerbation. Noted to by hypoxic to the 80s in ED, placed on bipap. Transfered to CCU s/p bumex gtt and nitro gtt, now weaned to NC and bumex IV. Now cont hydralazine, isordil, and bumex PO. TTE with severe AS, pt not candidate for R/L heart cath or TAVR per [employer]. C/c/b JUAN RAMON w/ urinary retention, nephro following, sandhu placed, passed TOV 6/11. C/c/b anemia/thrombocytopenia likely 2/2 recent chemo, s/p pRBC prn and cont home levofloxacin for neutropenia/chronic UTI. C/c/b steroid induced hyperglycemia, started lantus.       MDS: History of MDS;   Last chemotherapy on May 20th. 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 Per outpatient chart, anemia is also multifactorial (anemia of chronic disease/anemia of renal failure). Patient takes levofloxacin for neutropenia. Continue outpatient follow-up.    Acute on Chronic HFrEF/Acute Hypoxic Respiratory Failure  TTE revealed severe aortic stenosis (AS), repeat limited TTE 6/9 w/ normal RA pressure  C/w Hydralazine 75mg every 8 hours, C/w Isosorbide dinitrate (ISDN) 10mg every 8 hours  Apprec HF recs, holding bumex at this time, s/p IVF's 6/10 due to low JVP/hypovolemia  Per HF, plan to resume home PO lasix 40mg once a day at discharge.   C/w Toprol 12.5 mg po qd  CXR 6/10: Bilateral pleural effusions unchanged  Per Interventional Cards, case was d/w with interventionalist, Dr Mendoza, deemed patient is not a TAVR candidate, recommending medical therapy at this time given patient is high risk for bleeding and increased risk for complications  - No further TAVR work-up at this time  - Continue clopidogrel (Plavix), statin. Strict intake and output monitoring. Daily standing weights. Monitor electrolytes; replete potassium to > 4.0 mEq/L and magnesium to > 2.0 mg/dL  - Initially on NC, weaned to RA with good O2 Saturation, will not need O2 on discharge  - CT Chest per Renal recs 6/12: Small bilateral pleural effusions.  - Passed TOV    Probable fracture through the body of T9 vertebral body  Seen on CT chest done 6/12  - Plan for outpatient MRI spine to further evaluate thoracic MRI for further evaluation  - Asymptomatic at this time    Steroid induced hyperglycemia  Started on lantus 10U, discontinued at discharge as pt completed steroids.   FS much improved  Hgb A1c 5.5%    Acute on chronic CKD 3  Nephrology consulted. Recommendations appreciated  C/w calcium acetate 667 po tid   Cr improving off diuretics, 2.68 today  Monitor BMP    Hypercoagulable State/A Fib with RVR, pAFib  started apixaban (Eliquis)  Now on NSR     84M with PMH CKD 3, A Fib, Anemia, HTN, chronic UTI, gout, obesity, renal artery stenosis s/p L renal stent on plavix, remote hx of prostate cancer, and MDS s/p revlimid (ld 8/10/23), lenalidomide, and currently on C17 decitabine (ld 5/24/25). P/w 3 days of orthopnea, increasing shortness of breath and weight gain concerning for heart failure exacerbation. Noted to by hypoxic to the 80s in ED, placed on bipap. Transfered to CCU s/p bumex gtt and nitro gtt, now weaned to NC and bumex IV. Now cont hydralazine, isordil, and bumex PO. TTE with severe AS, pt not candidate for R/L heart cath or TAVR per [employer]. C/c/b JUAN RAMON w/ urinary retention, nephro following, sandhu placed, passed TOV 6/11. C/c/b anemia/thrombocytopenia likely 2/2 recent chemo, s/p pRBC prn and cont home levofloxacin for neutropenia/chronic UTI. C/c/b steroid induced hyperglycemia, started lantus.       MDS: History of MDS;   Last chemotherapy on May 20th. 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 Per outpatient chart, anemia is also multifactorial (anemia of chronic disease/anemia of renal failure). Patient takes levofloxacin for neutropenia. Continue outpatient follow-up.    Acute on Chronic HFrEF/Acute Hypoxic Respiratory Failure  TTE revealed severe aortic stenosis (AS), repeat limited TTE 6/9 w/ normal RA pressure  C/w Hydralazine 75mg every 8 hours, C/w Isosorbide dinitrate (ISDN) 10mg every 8 hours  Apprec HF recs, holding bumex at this time, s/p IVF's 6/10 due to low JVP/hypovolemia  Per HF, plan to resume home PO lasix 40mg once a day at discharge.   C/w Toprol 12.5 mg po qd  CXR 6/10: Bilateral pleural effusions unchanged  Per Interventional Cards, case was d/w with interventionalist, Dr Mendoza, deemed patient is not a TAVR candidate, recommending medical therapy at this time given patient is high risk for bleeding and increased risk for complications  - No further TAVR work-up at this time  - Continue clopidogrel (Plavix), statin. Strict intake and output monitoring. Daily standing weights. Monitor electrolytes; replete potassium to > 4.0 mEq/L and magnesium to > 2.0 mg/dL  - Initially on NC, weaned to RA with good O2 Saturation, will not need O2 on discharge  - CT Chest per Renal recs 6/12: Small bilateral pleural effusions.  - Passed TOV    Probable fracture through the body of T9 vertebral body  Seen on CT chest done 6/12  - Plan for outpatient MRI spine to further evaluate  - Asymptomatic at this time    Steroid induced hyperglycemia  Started on lantus 10U, discontinued at discharge as pt completed steroids.   FS much improved  Hgb A1c 5.5%    Acute on chronic CKD 3  Nephrology consulted. Recommendations appreciated  C/w calcium acetate 667 po tid   Cr improving off diuretics, 2.68 today  Monitor BMP    Hypercoagulable State/A Fib with RVR, pAFib  started apixaban (Eliquis)  Now on NSR    DISCHARGE DIAGNOSIS  Myelodysplastic Syndrome (MDS)  Acute on chronic heart failure with reduced ejection fraction (HFrEF)  Acute hypoxic respiratory failure  Severe Aortic Stenosis  Chronic kidney disease stage 3 (CKD 3)  Atrial fibrillation with rapid ventricular response (A Fib with RVR)/pAFib  Hypercoagulable state  Anemia  Hypertension (HTN)  Probable fracture through the body of T9 vertebral body  Prostate cancer  Gout  Steroid induced hyperglycemia  Obesity  Immunosuppressed status   84M with PMH CKD 3, A Fib, Anemia, HTN, chronic UTI, gout, obesity, renal artery stenosis s/p L renal stent on plavix, remote hx of prostate cancer, and MDS s/p revlimid (ld 8/10/23), lenalidomide, and currently on C17 decitabine (ld 5/24/25). P/w 3 days of orthopnea, increasing shortness of breath and weight gain concerning for heart failure exacerbation. Noted to by hypoxic to the 80s in ED, placed on bipap. Transfered to CCU s/p bumex gtt and nitro gtt, now weaned to NC and bumex IV >> Lasix PO resumed at discharge. Cont hydralazine, isordil, and toprol. TTE with severe AS, pt not candidate for R/L heart cath or TAVR per [employer]. C/c/b JUAN RAMON w/ urinary retention, nephro following, sandhu placed, passed TOV 6/11. C/c/b anemia/thrombocytopenia likely 2/2 recent chemo, s/p pRBC prn and cont home levofloxacin for neutropenia/chronic UTI. C/c/b steroid induced hyperglycemia, started lantus then discontinued at discharge as glucose improved.        MDS: History of MDS;   Last chemotherapy on May 20th. 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 Per outpatient chart, anemia is also multifactorial (anemia of chronic disease/anemia of renal failure). Patient takes levofloxacin for neutropenia. Continue outpatient follow-up.    Acute on Chronic HFrEF/Acute Hypoxic Respiratory Failure  TTE revealed severe aortic stenosis (AS), repeat limited TTE 6/9 w/ normal RA pressure  C/w Hydralazine 75mg every 8 hours, C/w Isosorbide dinitrate (ISDN) 10mg every 8 hours  Apprec HF recs, holding bumex at this time, s/p IVF's 6/10 due to low JVP/hypovolemia  Per HF, plan to resume home PO lasix 40mg once a day at discharge.   C/w Toprol 12.5 mg po qd  CXR 6/10: Bilateral pleural effusions unchanged  Per Interventional Cards, case was d/w with interventionalist, Dr Mendoza, deemed patient is not a TAVR candidate, recommending medical therapy at this time given patient is high risk for bleeding and increased risk for complications  - No further TAVR work-up at this time  - Continue clopidogrel (Plavix), statin. Strict intake and output monitoring. Daily standing weights. Monitor electrolytes; replete potassium to > 4.0 mEq/L and magnesium to > 2.0 mg/dL  - Initially on NC, weaned to RA with good O2 Saturation, will not need O2 on discharge  - CT Chest per Renal recs 6/12: Small bilateral pleural effusions.  - Passed TOV    Probable fracture through the body of T9 vertebral body  Seen on CT chest done 6/12  - Plan for outpatient MRI spine to further evaluate  - Asymptomatic at this time    Steroid induced hyperglycemia  Started on lantus 10U, discontinued at discharge as pt completed steroids.   FS much improved  Hgb A1c 5.5%    Acute on chronic CKD 3  Nephrology consulted. Recommendations appreciated  C/w calcium acetate 667 po tid   Cr improving off diuretics, 2.68 today  Monitor BMP    Hypercoagulable State/A Fib with RVR, pAFib  started apixaban (Eliquis)  Now on NSR    DISCHARGE DIAGNOSIS  Myelodysplastic Syndrome (MDS)  Acute on chronic heart failure with reduced ejection fraction (HFrEF)  Acute hypoxic respiratory failure  Severe Aortic Stenosis  Chronic kidney disease stage 3 (CKD 3)  Atrial fibrillation with rapid ventricular response (A Fib with RVR)/pAFib  Hypercoagulable state  Anemia  Hypertension (HTN)  Probable fracture through the body of T9 vertebral body  Prostate cancer  Gout  Steroid induced hyperglycemia  Obesity  Immunosuppressed status

## 2025-06-06 NOTE — DISCHARGE NOTE PROVIDER - NSDCFUADDAPPT_GEN_ALL_CORE_FT
APPTS ARE READY TO BE MADE: [x] YES    Best Family or Patient Contact (if needed):    Additional Information about above appointments (if needed):    1: Dr. Gabriel Reddy (Cardiology) in 2 weeks to follow up heart failure  2:   3:     Other comments or requests:    APPTS ARE READY TO BE MADE: [x] YES    Best Family or Patient Contact (if needed):    Additional Information about above appointments (if needed):    1: Dr. Gabriel Reddy (Cardiology) in 2 weeks to follow up heart failure  2:   3:     Other comments or requests:     Prior to outreaching the patient, it was visible that the patient has secured a follow up appointment which was not scheduled by our team. Patient is scheduled with Dr. Mcmahon 8/1 11:40am [address] [place] [address]

## 2025-06-06 NOTE — PROGRESS NOTE ADULT - SUBJECTIVE AND OBJECTIVE BOX
Zheng Velasco NP  CCU Service  Cardiology   Spect: [phone] ([facility])     PATIENT: MASOOD MACARIO, MRN: [id number]    CHIEF COMPLAINT: Patient is a 84y old  Male who presents with a chief complaint of Fluid overload (2025 13:26)      INTERVAL HISTORY/OVERNIGHT EVENTS: LLE pain. Denies abdominal pain, chest pain or SOB, cough. Oriented to person, place, and time. Breathing comfortably on O2 2L NC.    REVIEW OF SYSTEMS:    Constitutional:     [ ] negative [ ] fevers [ ] chills [ ] weight loss [ ] weight gain  HEENT:                  [ ] negative [ ] dry eyes [ ] eye irritation [ ] postnasal drip [ ] nasal congestion  CV:                         [ ] negative  [ ] chest pain [ ] orthopnea [ ] palpitations [ ] murmur  Resp:                     [ ] negative [ ] cough [ ] shortness of breath [ ] dyspnea [ ] wheezing [ ] sputum [ ] hemoptysis  GI:                          [ ] negative [ ] nausea [ ] vomiting [ ] diarrhea [ ] constipation [ ] abd pain [ ] dysphagia   :                        [ ] negative [ ] dysuria [ ] nocturia [ ] hematuria [ ] increased urinary frequency  Musculoskeletal: [ ] negative [ ] back pain [ ] myalgias [ ] arthralgias [ ] fracture  Skin:                       [ ] negative [ ] rash [ ] itch  Neurological:        [ ] negative [ ] headache [ ] dizziness [ ] syncope [x ] weakness [ ] numbness  Psychiatric:           [ ] negative [ ] anxiety [ ] depression  Endocrine:            [ ] negative [ ] diabetes [ ] thyroid problem  Heme/Lymph:      [ ] negative [x ] anemia [ ] bleeding problem  Allergic/Immune: [ ] negative [ ] itchy eyes [ ] nasal discharge [ ] hives [ ] angioedema    [x ] All other systems negative  [ ] Unable to assess ROS because ________.    MEDICATIONS:  MEDICATIONS  (STANDING):  allopurinol 200 milliGRAM(s) Oral daily  apixaban      apixaban 2.5 milliGRAM(s) Oral two times a day  bumetanide Infusion 1 mG/Hr (5 mL/Hr) IV Continuous <Continuous>  chlorhexidine 2% Cloths 1 Application(s) Topical <User Schedule>  clopidogrel Tablet 75 milliGRAM(s) Oral daily  gabapentin 300 milliGRAM(s) Oral two times a day  hydrALAZINE 37.5 milliGRAM(s) Oral three times a day  levoFLOXacin  Tablet 250 milliGRAM(s) Oral every 24 hours  pantoprazole    Tablet 40 milliGRAM(s) Oral before breakfast  rosuvastatin 20 milliGRAM(s) Oral at bedtime    MEDICATIONS  (PRN):  melatonin 3 milliGRAM(s) Oral at bedtime PRN Insomnia      ALLERGIES: Allergies    No Known Allergies    Intolerances        OBJECTIVE:  ICU Vital Signs Last 24 Hrs  T(C): 36.4 (2025 08:00), Max: 37.2 (2025 16:00)  T(F): 97.5 (2025 08:00), Max: 99 (2025 16:00)  HR: 84 (2025 06:00) (83 - 115)  BP: 108/60 (2025 06:00) (94/82 - 130/80)  BP(mean): 69 (2025 06:00) (67 - 117)  RR: 19 (2025 06:00) (16 - 23)  SpO2: 94% (2025 06:00) (89% - 100%)    O2 Parameters below as of 2025 06:00  Patient On (Oxygen Delivery Method): nasal cannula  O2 Flow (L/min): 4    CAPILLARY BLOOD GLUCOSE        I&O's Summary    2025 07:01  -  2025 07:00  --------------------------------------------------------  IN: 1030 mL / OUT: 3820 mL / NET: -2790 mL        Daily Weight in k.3 (2025 05:00)    PHYSICAL EXAMINATION:  General: Comfortable, no acute distress, cooperative with exam.  HEENT: Moist mucous membranes.  Respiratory: Crackles right .  CV: RRR, S1S2, no murmurs, rubs or gallops. Elevated JVD. Distal pulses intact.  Abdominal: Soft, nontender, nondistended, no rebound or guarding, normal bowel sounds.  Neurology: AOx3, no focal neuro defects, RICHARDSON x 4.  Extremities: Trace pitting edema, + Peripheral pulses.  Cath site:   Tubes:    LABS:  ABG - ( 2025 03:26 )  pH, Arterial: 7.49  pH, Blood: x     /  pCO2: 37    /  pO2: 150   / HCO3: 28    / Base Excess: 4.6   /  SaO2: 98.9                                    8.6    2.39  )-----------( 58       ( 2025 05:57 )             25.8     06-06    147[H]  |  103  |  50[H]  ----------------------------<  148[H]  3.6   |  26  |  2.97[H]    Ca    8.3[L]      2025 05:57  Phos  4.4     06-06  Mg     2.30     06-06    TPro  6.7  /  Alb  3.9  /  TBili  0.8  /  DBili  x   /  AST  14  /  ALT  7   /  AlkPhos  93  06-06    LIVER FUNCTIONS - ( 2025 05:57 )  Alb: 3.9 g/dL / Pro: 6.7 g/dL / ALK PHOS: 93 U/L / ALT: 7 U/L / AST: 14 U/L / GGT: x                   Urinalysis Basic - ( 2025 05:57 )    Color: x / Appearance: x / SG: x / pH: x  Gluc: 148 mg/dL / Ketone: x  / Bili: x / Urobili: x   Blood: x / Protein: x / Nitrite: x   Leuk Esterase: x / RBC: x / WBC x   Sq Epi: x / Non Sq Epi: x / Bacteria: x        TELEMETRY: NSR    EKG:     IMAGING:   TTE W or WO Ultrasound Enhancing Agent (25 @ 06:31)   1. The left ventricular cavity is normal in size. Left ventricular wall thickness is normal. Left ventricular systolic function is mildly to moderately decreased with an ejection fraction visually estimated at 40 to 45%. There are regional wall motion abnormalities present. Hypokinesis of the inferior and inferolateral walls.   2. Normal right ventricular cavity size and probably normal right ventricular systolic function. Tricuspid annular plane systolic excursion (TAPSE) is 2.0 cm (normal >=1.7 cm).   3. Structurally normal mitral valve with normal leaflet excursion. There is calcification of the mitral valve annulus. There is mild leaflet calcification. There is mild to moderatemitral regurgitation.   4. The aortic valve anatomy cannot be determined. There is calcification of the aortic valve leaflets. The peak transaortic velocity is 3.51 m/s, peak transaortic gradient is 49.3 mmHg and mean transaortic gradient is 26.0 mmHg with an LVOT/aortic valve VTI ratio of 0.25. The gross appearance of the aortic valve is consistent with significant aortic stenosis. However, unable to accurately estimate the aortic valve area. There is mild aortic regurgitation.   5. The left atrium is moderately dilated with an indexed volume of 45.18 ml/m².   6. The tricuspid valve is structurally normal with normal leaflet excursion. There is mild tricuspid regurgitation. Estimated pulmonary artery systolic pressure is 42 mmHg, consistent with mild pulmonary hypertension.   7. Left pleural effusion noted.   8. No prior echocardiogram is available for comparison.

## 2025-06-06 NOTE — DISCHARGE NOTE PROVIDER - NSDCMRMEDTOKEN_GEN_ALL_CORE_FT
allopurinol 100 mg oral tablet: 2 tab(s) orally once a day  amLODIPine 5 mg oral tablet: 1 tab(s) orally once a day  aspirin 81 mg oral capsule: 1 cap(s) orally once a day  furosemide 40 mg oral tablet: 1 tab(s) orally once a day  gabapentin 300 mg oral capsule: 1 cap(s) orally 3 times a day  levoFLOXacin 500 mg oral tablet: 1 tab(s) orally once a day  omeprazole 20 mg oral delayed release tablet: 1 tab(s) orally once a day  Plavix 75 mg oral tablet: 1 tab(s) orally once a day  rosuvastatin 10 mg oral tablet: 1 tab(s) orally once a day  Semi-electric hospital bed: Dx HF, PATRIZIA 99   allopurinol 100 mg oral tablet: 2 tab(s) orally once a day  amLODIPine 5 mg oral tablet: 1 tab(s) orally once a day  aspirin 81 mg oral capsule: 1 cap(s) orally once a day  furosemide 40 mg oral tablet: 1 tab(s) orally once a day  gabapentin 300 mg oral capsule: 1 cap(s) orally 3 times a day  levoFLOXacin 500 mg oral tablet: 1 tab(s) orally once a day  omeprazole 20 mg oral delayed release tablet: 1 tab(s) orally once a day  PICC care: PICC care once a week.  Plavix 75 mg oral tablet: 1 tab(s) orally once a day  rosuvastatin 10 mg oral tablet: 1 tab(s) orally once a day  Semi-electric hospital bed: Dx HF, PATRIZIA 99   acetaminophen 325 mg oral tablet: 2 tab(s) orally every 6 hours As needed Moderate Pain (4 - 6)  allopurinol 100 mg oral tablet: 2 tab(s) orally once a day  calcium acetate 667 mg oral capsule: 3 cap(s) orally 3 times a day (with meals)  Eliquis 2.5 mg oral tablet: 1 tab(s) orally every 12 hours for history of afib  furosemide 40 mg oral tablet: 1 tab(s) orally once a day  gabapentin 300 mg oral capsule: 1 cap(s) orally 3 times a day  hydrALAZINE 25 mg oral tablet: 3 tab(s) orally 3 times a day  isosorbide dinitrate 10 mg oral tablet: 1 tab(s) orally 3 times a day  levoFLOXacin 500 mg oral tablet: 1 tab(s) orally once a day  omeprazole 20 mg oral delayed release tablet: 1 tab(s) orally once a day  PICC care: PICC care once a week..  Plavix 75 mg oral tablet: 1 tab(s) orally once a day  polyethylene glycol 3350 oral powder for reconstitution: 17 gram(s) orally once a day  rosuvastatin 10 mg oral tablet: 1 tab(s) orally once a day  senna leaf extract oral tablet: 2 tab(s) orally once a day (at bedtime)  Toprol-XL 25 mg oral tablet, extended release: 0.5 tab(s) orally once a day   acetaminophen 325 mg oral tablet: 2 tab(s) orally every 6 hours As needed Moderate Pain (4 - 6)  allopurinol 100 mg oral tablet: 2 tab(s) orally once a day  calcium acetate 667 mg oral capsule: 3 cap(s) orally 3 times a day (with meals)  Eliquis 2.5 mg oral tablet: 1 tab(s) orally every 12 hours for history of afib  furosemide 40 mg oral tablet: 1 tab(s) orally once a day  gabapentin 300 mg oral capsule: 1 cap(s) orally 3 times a day  hydrALAZINE 25 mg oral tablet: 3 tab(s) orally 3 times a day  isosorbide dinitrate 10 mg oral tablet: 1 tab(s) orally 3 times a day  levoFLOXacin 500 mg oral tablet: 1 tab(s) orally once a day  Lovenox 100 mg/mL injectable solution: 100 milligram(s) subcutaneously once a day renally dosed anticoagulation for history of atrial fibrillation  omeprazole 20 mg oral delayed release tablet: 1 tab(s) orally once a day  PICC care: PICC care once a week..  Plavix 75 mg oral tablet: 1 tab(s) orally once a day  polyethylene glycol 3350 oral powder for reconstitution: 17 gram(s) orally once a day  rosuvastatin 10 mg oral tablet: 1 tab(s) orally once a day  senna leaf extract oral tablet: 2 tab(s) orally once a day (at bedtime)  Toprol-XL 25 mg oral tablet, extended release: 0.5 tab(s) orally once a day

## 2025-06-07 LAB
ALBUMIN SERPL ELPH-MCNC: 3.9 G/DL — SIGNIFICANT CHANGE UP (ref 3.3–5)
ALP SERPL-CCNC: 86 U/L — SIGNIFICANT CHANGE UP (ref 40–120)
ALT FLD-CCNC: 7 U/L — SIGNIFICANT CHANGE UP (ref 4–41)
ANION GAP SERPL CALC-SCNC: 17 MMOL/L — HIGH (ref 7–14)
AST SERPL-CCNC: 13 U/L — SIGNIFICANT CHANGE UP (ref 4–40)
BASOPHILS # BLD AUTO: 0 K/UL — SIGNIFICANT CHANGE UP (ref 0–0.2)
BASOPHILS NFR BLD AUTO: 0 % — SIGNIFICANT CHANGE UP (ref 0–2)
BILIRUB SERPL-MCNC: 0.7 MG/DL — SIGNIFICANT CHANGE UP (ref 0.2–1.2)
BLD GP AB SCN SERPL QL: NEGATIVE — SIGNIFICANT CHANGE UP
BUN SERPL-MCNC: 60 MG/DL — HIGH (ref 7–23)
CALCIUM SERPL-MCNC: 8.2 MG/DL — LOW (ref 8.4–10.5)
CHLORIDE SERPL-SCNC: 102 MMOL/L — SIGNIFICANT CHANGE UP (ref 98–107)
CO2 SERPL-SCNC: 24 MMOL/L — SIGNIFICANT CHANGE UP (ref 22–31)
CREAT SERPL-MCNC: 3.06 MG/DL — HIGH (ref 0.5–1.3)
EGFR: 20 ML/MIN/1.73M2 — LOW
EGFR: 20 ML/MIN/1.73M2 — LOW
EOSINOPHIL # BLD AUTO: 0 K/UL — SIGNIFICANT CHANGE UP (ref 0–0.5)
EOSINOPHIL NFR BLD AUTO: 0 % — SIGNIFICANT CHANGE UP (ref 0–6)
GLUCOSE SERPL-MCNC: 188 MG/DL — HIGH (ref 70–99)
HCT VFR BLD CALC: 26.4 % — LOW (ref 39–50)
HGB BLD-MCNC: 8.6 G/DL — LOW (ref 13–17)
IANC: 1.54 K/UL — LOW (ref 1.8–7.4)
IMM GRANULOCYTES NFR BLD AUTO: 0.5 % — SIGNIFICANT CHANGE UP (ref 0–0.9)
LYMPHOCYTES # BLD AUTO: 0.47 K/UL — LOW (ref 1–3.3)
LYMPHOCYTES # BLD AUTO: 21.5 % — SIGNIFICANT CHANGE UP (ref 13–44)
MAGNESIUM SERPL-MCNC: 2.4 MG/DL — SIGNIFICANT CHANGE UP (ref 1.6–2.6)
MCHC RBC-ENTMCNC: 32.6 G/DL — SIGNIFICANT CHANGE UP (ref 32–36)
MCHC RBC-ENTMCNC: 33.6 PG — SIGNIFICANT CHANGE UP (ref 27–34)
MCV RBC AUTO: 103.1 FL — HIGH (ref 80–100)
MONOCYTES # BLD AUTO: 0.17 K/UL — SIGNIFICANT CHANGE UP (ref 0–0.9)
MONOCYTES NFR BLD AUTO: 7.8 % — SIGNIFICANT CHANGE UP (ref 2–14)
NEUTROPHILS # BLD AUTO: 1.54 K/UL — LOW (ref 1.8–7.4)
NEUTROPHILS NFR BLD AUTO: 70.2 % — SIGNIFICANT CHANGE UP (ref 43–77)
NRBC # BLD AUTO: 0 K/UL — SIGNIFICANT CHANGE UP (ref 0–0)
NRBC # FLD: 0 K/UL — SIGNIFICANT CHANGE UP (ref 0–0)
NRBC BLD AUTO-RTO: 0 /100 WBCS — SIGNIFICANT CHANGE UP (ref 0–0)
PHOSPHATE SERPL-MCNC: 4.7 MG/DL — HIGH (ref 2.5–4.5)
PLATELET # BLD AUTO: 62 K/UL — LOW (ref 150–400)
POTASSIUM SERPL-MCNC: 4.1 MMOL/L — SIGNIFICANT CHANGE UP (ref 3.5–5.3)
POTASSIUM SERPL-SCNC: 4.1 MMOL/L — SIGNIFICANT CHANGE UP (ref 3.5–5.3)
PROT SERPL-MCNC: 6.6 G/DL — SIGNIFICANT CHANGE UP (ref 6–8.3)
RBC # BLD: 2.56 M/UL — LOW (ref 4.2–5.8)
RBC # FLD: 23.8 % — HIGH (ref 10.3–14.5)
RH IG SCN BLD-IMP: NEGATIVE — SIGNIFICANT CHANGE UP
SODIUM SERPL-SCNC: 143 MMOL/L — SIGNIFICANT CHANGE UP (ref 135–145)
WBC # BLD: 2.19 K/UL — LOW (ref 3.8–10.5)
WBC # FLD AUTO: 2.19 K/UL — LOW (ref 3.8–10.5)

## 2025-06-07 PROCEDURE — 99233 SBSQ HOSP IP/OBS HIGH 50: CPT

## 2025-06-07 PROCEDURE — 99232 SBSQ HOSP IP/OBS MODERATE 35: CPT

## 2025-06-07 RX ORDER — POLYETHYLENE GLYCOL 3350 17 G/17G
17 POWDER, FOR SOLUTION ORAL DAILY
Refills: 0 | Status: DISCONTINUED | OUTPATIENT
Start: 2025-06-07 | End: 2025-06-13

## 2025-06-07 RX ORDER — BUMETANIDE 1 MG/1
2 TABLET ORAL EVERY 12 HOURS
Refills: 0 | Status: DISCONTINUED | OUTPATIENT
Start: 2025-06-07 | End: 2025-06-10

## 2025-06-07 RX ORDER — ACETAMINOPHEN 500 MG/5ML
650 LIQUID (ML) ORAL EVERY 6 HOURS
Refills: 0 | Status: DISCONTINUED | OUTPATIENT
Start: 2025-06-07 | End: 2025-06-13

## 2025-06-07 RX ORDER — SENNA 187 MG
2 TABLET ORAL AT BEDTIME
Refills: 0 | Status: DISCONTINUED | OUTPATIENT
Start: 2025-06-07 | End: 2025-06-13

## 2025-06-07 RX ADMIN — ISOSORBDIE DINITRATE 10 MILLIGRAM(S): 30 TABLET ORAL at 05:30

## 2025-06-07 RX ADMIN — BUMETANIDE 2 MILLIGRAM(S): 1 TABLET ORAL at 17:56

## 2025-06-07 RX ADMIN — APIXABAN 2.5 MILLIGRAM(S): 2.5 TABLET, FILM COATED ORAL at 17:57

## 2025-06-07 RX ADMIN — Medication 1 APPLICATION(S): at 05:31

## 2025-06-07 RX ADMIN — Medication 650 MILLIGRAM(S): at 14:19

## 2025-06-07 RX ADMIN — GABAPENTIN 300 MILLIGRAM(S): 400 CAPSULE ORAL at 17:59

## 2025-06-07 RX ADMIN — PREDNISONE 40 MILLIGRAM(S): 20 TABLET ORAL at 05:30

## 2025-06-07 RX ADMIN — Medication 37.5 MILLIGRAM(S): at 13:19

## 2025-06-07 RX ADMIN — POLYETHYLENE GLYCOL 3350 17 GRAM(S): 17 POWDER, FOR SOLUTION ORAL at 13:19

## 2025-06-07 RX ADMIN — APIXABAN 2.5 MILLIGRAM(S): 2.5 TABLET, FILM COATED ORAL at 05:31

## 2025-06-07 RX ADMIN — Medication 40 MILLIGRAM(S): at 05:31

## 2025-06-07 RX ADMIN — CLOPIDOGREL BISULFATE 75 MILLIGRAM(S): 75 TABLET, FILM COATED ORAL at 11:25

## 2025-06-07 RX ADMIN — Medication 2 TABLET(S): at 21:46

## 2025-06-07 RX ADMIN — ISOSORBDIE DINITRATE 10 MILLIGRAM(S): 30 TABLET ORAL at 11:25

## 2025-06-07 RX ADMIN — Medication 200 MILLIGRAM(S): at 11:25

## 2025-06-07 RX ADMIN — Medication 37.5 MILLIGRAM(S): at 05:30

## 2025-06-07 RX ADMIN — ISOSORBDIE DINITRATE 10 MILLIGRAM(S): 30 TABLET ORAL at 17:56

## 2025-06-07 RX ADMIN — Medication 650 MILLIGRAM(S): at 13:19

## 2025-06-07 RX ADMIN — Medication 37.5 MILLIGRAM(S): at 21:44

## 2025-06-07 RX ADMIN — ROSUVASTATIN CALCIUM 20 MILLIGRAM(S): 20 TABLET, FILM COATED ORAL at 21:45

## 2025-06-07 RX ADMIN — GABAPENTIN 300 MILLIGRAM(S): 400 CAPSULE ORAL at 05:31

## 2025-06-07 NOTE — PROGRESS NOTE ADULT - ASSESSMENT
85 Y/O M PMH MDS, HTN, CKD 3, A Fib, Anemia, HTN, Prostate CA, gout, obesity, presents with 3 days of orthopnea, increasing shortness of breath and weight gain  CHF   CKD stage 4   Anemia, thrombocytopenia, leukopenia - Pancytopenia   Severe AS   Hypernatremia     Plan:    1 Renal- renal fxn trending higher, Bumex 2mg PO BID per HF rec's ;  Allow oral hydration(this will not lead to chf)   Trend serum creatinine and strict ins and outs   Encourage po intake   2 CVS- BP stable, now off nitro gtt and not on BBlockers at present; on Hydralazine 37.5mg po tid and Isordil 10 mg po tid   Mgmt per HF   Right and left heart cath likely on Monday - No renal objection   3 Pulm- now off bipap and on nasal cannula   May need CT of the chest to assess the effusions and see if he would benefit from possible thoracentesis   4 Heme - h/h stable, s/p PRBC       Digna Hand NP  [facility]   [phone]        83 Y/O M PMH MDS, HTN, CKD 3, A Fib, Anemia, HTN, Prostate CA, gout, obesity, presents with 3 days of orthopnea, increasing shortness of breath and weight gain  CHF   CKD stage 4   Anemia, thrombocytopenia, leukopenia - Pancytopenia   Severe AS   Hypernatremia     Plan:    1 Renal- renal fxn trending higher, Bumex 2mg PO BID per HF rec's ;  Allow oral hydration(this will not lead to chf)   Trend serum creatinine and strict ins and outs   Encourage po intake   2 CVS- BP stable, now off nitro gtt and not on BBlockers at present; on Hydralazine 37.5mg po tid and Isordil 10 mg po tid   Mgmt per HF   Right and left heart cath likely on Monday - No renal objection   Plan for TAVR evaluation this admission  3 Pulm- now off bipap and on nasal cannula   May need CT of the chest to assess the effusions and see if he would benefit from possible thoracentesis   4 Heme - h/h stable, s/p PRBC       Digna Hand NP  [facility]   [phone]        85 Y/O M PMH MDS, HTN, CKD 3, A Fib, Anemia, HTN, Prostate CA, gout, obesity, presents with 3 days of orthopnea, increasing shortness of breath and weight gain  CHF   CKD stage 4   Anemia, thrombocytopenia, leukopenia - Pancytopenia   Severe AS   Hypernatremia     Plan:    1 Renal- renal fxn trending higher, Bumex 2mg PO BID per HF rec's ;  Allow oral hydration(this will not lead to chf)   Trend serum creatinine and strict ins and outs   Encourage po intake   No IVF's  2 CVS- BP stable, now off nitro gtt and not on BBlockers at present; on Hydralazine 37.5mg po tid and Isordil 10 mg po tid   Mgmt per HF   Right and left heart cath likely on Monday - No renal objection   Plan for TAVR evaluation this admission  3 Pulm- now off bipap and on nasal cannula   May need CT of the chest to assess the effusions and see if he would benefit from possible thoracentesis   4 Heme - h/h stable, s/p PRBC       Digna Hand NP  [facility]   [phone]

## 2025-06-07 NOTE — PROGRESS NOTE ADULT - NS ATTEND AMEND GEN_ALL_CORE FT
84 year old man with MDS, HTN HLD, USHA sp stent, AF who presented with progressive dyspnea and fluid overload. Placed on Bipap and started on both bumex and nitro gtt. Transferred to floor - Cr increased    TTE 6/4/25:   1. The left ventricular cavity is normal in size. Left ventricular wall thickness is normal. Left ventricular systolic function is mildly to moderately decreased with an ejection fraction visually estimated at 40 to 45%. There are regional wall motion abnormalities present. Hypokinesis of the inferior and inferolateral walls.   2. Normal right ventricular cavity size and probably normal right ventricular systolic function. Tricuspid annular plane systolic excursion (TAPSE) is 2.0 cm (normal >=1.7 cm).   3. Structurally normal mitral valve with normal leaflet excursion. There is calcification of the mitral valve annulus. There is mild leaflet calcification. There is mild to moderate mitral regurgitation.   4. The aortic valve anatomy cannot be determined. There is calcification of the aortic valve leaflets. The peak transaortic velocity is 3.51 m/s, peak transaortic gradient is 49.3 mmHg and mean transaortic gradient is 26.0 mmHg with an LVOT/aortic valve VTI ratio of 0.25. The gross appearance of the aortic valve is consistent with significant aortic stenosis. However, unable to accurately estimate the aortic valve area. There is mild aortic regurgitation.   5. The left atrium is moderately dilated with an indexed volume of 45.18 ml/m².   6. The tricuspid valve is structurally normal with normal leaflet excursion. There is mild tricuspid regurgitation. Estimated pulmonary artery systolic pressure is 42 mmHg, consistent with mild pulmonary hypertension.   7. Left pleural effusion noted.   8. No prior echocardiogram is available for comparison.    Meds:  Bumex 2 mg PO BID  ASA  Plavix  Hydral 37.5 mg TID  Isordil 10 mg TID  Crestor 10 mg daily  Eliquis 2.5 mg BID  Levaquin  Protonix 40 mg daily  Neurontin 300 mg BID    #CV- HF with mild to moderately reduced EF (40-45%), mild to moderate MR, significant AS, AF  Continue Bumex PO  Added afterload reduction with hydral and isordil  Continue plavix/statin  Eventual workup for AS once CR stable  Continue Eliquis.

## 2025-06-07 NOTE — PROGRESS NOTE ADULT - SUBJECTIVE AND OBJECTIVE BOX
Interval History:  Patient resting comfortably in bed   Denies CP/SOB/palpitations/dizziness.  No acute events overnight.      Medications:  allopurinol 200 milliGRAM(s) Oral daily  apixaban      apixaban 2.5 milliGRAM(s) Oral two times a day  chlorhexidine 2% Cloths 1 Application(s) Topical <User Schedule>  clopidogrel Tablet 75 milliGRAM(s) Oral daily  gabapentin 300 milliGRAM(s) Oral two times a day  hydrALAZINE 37.5 milliGRAM(s) Oral three times a day  isosorbide   dinitrate Tablet (ISORDIL) 10 milliGRAM(s) Oral three times a day  levoFLOXacin  Tablet 250 milliGRAM(s) Oral every 24 hours  melatonin 3 milliGRAM(s) Oral at bedtime PRN  pantoprazole    Tablet 40 milliGRAM(s) Oral before breakfast  rosuvastatin 20 milliGRAM(s) Oral at bedtime      Vitals:  T(C): 36.4 (25 @ 08:00), Max: 37.2 (25 @ 16:00)  HR: 80 (25 @ 09:00) (79 - 115)  BP: 111/66 (25 @ 09:00) (94/82 - 130/80)  BP(mean): 79 (25 @ 09:00) (67 - 117)  RR: 16 (25 @ 09:00) (14 - 23)  SpO2: 99% (25 @ 09:00) (89% - 99%)    Daily     Daily Weight in k.3 (2025 05:00)        I&O's Summary    2025 07:01  -  2025 07:00  --------------------------------------------------------  IN: 1035 mL / OUT: 3820 mL / NET: -2785 mL    2025 07:01  -  2025 10:01  --------------------------------------------------------  IN: 150 mL / OUT: 350 mL / NET: -200 mL        Physical Exam:  Appearance: No Acute Distress  Neck: JVP~6  Cardiovascular: Normal S1 S2  Respiratory: Clear to auscultation bilaterally  Gastrointestinal: Soft, Non-tender	  Skin: No cyanosis	  Neurologic: Non-focal  Extremities: No BLE edema; warm to touch and R knee pain/redness/swelling (history of gout)       Labs:                        8.6    2.39  )-----------( 58       ( 2025 05:57 )             25.8     -    147[H]  |  103  |  50[H]  ----------------------------<  148[H]  3.6   |  26  |  2.97[H]    Ca    8.3[L]      2025 05:57  Phos  4.4     -  Mg     2.30     -    TPro  6.7  /  Alb  3.9  /  TBili  0.8  /  DBili  x   /  AST  14  /  ALT  7   /  AlkPhos  93  -      TELEMETRY:     Echocardiogram:  < from: TTE W or WO Ultrasound Enhancing Agent (25 @ 06:31) >       CONCLUSIONS:      1. The left ventricular cavity is normal in size. Left ventricular wall thickness is normal. Left ventricular systolic function is mildly to moderately decreased with an ejection fraction visually estimated at 40 to 45%. There are regional wall motion abnormalities present. Hypokinesis of the inferior and inferolateral walls.   2. Normal right ventricular cavity size and probably normal right ventricular systolic function. Tricuspid annular plane systolic excursion (TAPSE) is 2.0 cm (normal >=1.7 cm).   3. Structurally normal mitral valve with normal leaflet excursion. There is calcification of the mitral valve annulus. There is mild leaflet calcification. There is mild to moderatemitral regurgitation.   4. The aortic valve anatomy cannot be determined. There is calcification of the aortic valve leaflets. The peak transaortic velocity is 3.51 m/s, peak transaortic gradient is 49.3 mmHg and mean transaortic gradient is 26.0 mmHg with an LVOT/aortic valve VTI ratio of 0.25. The gross appearance of the aortic valve is consistent with significant aortic stenosis. However, unable to accurately estimate the aortic valve area. There is mild aortic regurgitation.   5. The left atrium is moderately dilated with an indexed volume of 45.18 ml/m².   6. The tricuspid valve is structurally normal with normal leaflet excursion. There is mild tricuspid regurgitation. Estimated pulmonary artery systolic pressure is 42 mmHg, consistent with mild pulmonary hypertension.   7. Left pleural effusion noted.   8. No prior echocardiogram is available for comparison.    ____________________________________________________________________  Recommendations:  Consider JEAN for further evaluation of the aortic valve, if clinically indicated.     ________________________________________________________________________________________  FINDINGS:     Left Ventricle:  The left ventricular cavity is normal in size. Left ventricular wall thicknessis normal. Left ventricular systolic function is mildly to moderately decreased with an ejection fraction visually estimated at 40 to 45%. There are regional wall motion abnormalities present. Hypokinesis of the inferior and inferolateral walls.     Right Ventricle:  The right ventricular cavity is normal in size and right ventricular systolic function is probably normal. Tricuspid annular plane systolic excursion (TAPSE) is 2.0 cm (normal >=1.7 cm).     Left Atrium:  The left atrium is moderately dilated with an indexed volume of 45.18 ml/m².     Right Atrium:  The right atrium is normal in size with an indexed volume of 19.92 ml/m² and an indexed area of 8.16 cm²/m².     Aortic Valve:  The aortic valve anatomy cannot be determined. There iscalcification of the aortic valve leaflets. The peak transaortic velocity is 3.51 m/s, peak transaortic gradient is 49.3 mmHg and mean transaortic gradient is 26.0 mmHg with an LVOT/aortic valve VTI ratio of 0.25. The gross appearance of the aortic valve is consistent with significant aortic stenosis. However, unable to accurately estimate the aortic valve area. There is mild aortic regurgitation.     Mitral Valve:  Structurally normal mitral valve with normal leaflet excursion. There is calcification of the mitral valve annulus. There is mild leaflet calcification. There is mild to moderate mitral regurgitation.     Tricuspid Valve:  The tricuspid valve is structurally normal with normal leaflet excursion. There is mild tricuspid regurgitation. Estimated pulmonary artery systolic pressure is 42 mmHg, consistent with mild pulmonary hypertension.     Pulmonic Valve:  Structurally normal pulmonic valve with normal leaflet excursion. There is trace pulmonic regurgitation.     Aorta:  The aortic arch is not well visualized. The aortic root at the sinuses of Valsalva is normal in size, measuring 3.00 cm (indexed 1.46 cm/m²). The ascending aorta is normal in size.     Pericardium:  No pericardial effusion seen.     Pleura:  Left pleural effusion noted.     Systemic Veins:  The inferior vena cava is normal in size measuring 2.10 cm in diameter, (normal <2.1cm) with normal inspiratory collapse (normal >50%) consistent with normal right atrial pressure (~3, range 0-5mmHg).  ____________________________________________________________________  QUANTITATIVE DATA:  Left Ventricle Measurements: (Indexed to BSA)     IVSd (2D):   0.9 cm  LVPWd (2D):  0.9 cm  LVIDd (2D):  5.5 cm  LVIDs (2D):  4.7 cm  LV Mass:     183 g  88.9 g/m²  Visualized LV EF%: 40 to 45%     MV E Vmax:    1.37 m/s  MV A Vmax:    1.06 m/s  MV E/A:       1.29  e' lateral:   7.51 cm/s  e' medial:    7.29 cm/s  E/e' lateral: 18.24  E/e' medial:  18.79  E/e' Average: 18.51  MV DT:        118 msec    Aorta Measurements: (Normal range) (Indexed to BSA)     Ao Root d     3.00 cm (3.1 - 3.7 cm) 1.46 cm/m²  Ao Asc d, 2D: 2.50  Ao Asc prox:  2.50 cm                1.21 cm/m²            Left Atrium Measurements: (Indexed to BSA)  LA Diam 2D: 4.40 cm         Right Ventricle Measurements: Right Atrial Measurements:     TAPSE:            2.0 cm      RA Vol s, MOD A4C         41.0 ml  RV S' Vmax:       11.90 cm/s  RA Vol s, MOD A4C i BSA   19.92 ml/m²  RV Base (RVID1):  3.0 cm      RA Area s, MOD A4C        16.8 cm²  RV Mid (RVID2):   2.6 cm      RA Area s, MOD A4C, i BSA 8.16 cm²/m²  RV Major (RVID3): 5.1 cm       LVOT / RVOT/ Qp/Qs Data: (Indexed to BSA)  LVOT Vmax:      0.87 m/s  LVOT Vmn:       0.590 m/s  LVOT VTI:       18.90 cm  LVOT peak grad: 3 mmHg  LVOT mean grad: 1.5 mmHg    Aortic Valve Measurements:  AV Vmax:                3.5 m/s  AV Peak Gradient:       49.3 mmHg  AV Mean Gradient:       26.0 mmHg  AV VTI:                 75.3 cm  AV VTI Ratio:           0.25  AoV Dimensionless Index 0.25    Mitral Valve Measurements:     MV E Vmax: 1.4 m/s  MV A Vmax: 1.1 m/s  MV E/A:    1.3       Tricuspid Valve Measurements:     TR Vmax:          3.1 m/s  TR Peak Gradient: 38.7 mmHg  RA Pressure:      3 mmHg  PASP:             42 mmHg

## 2025-06-07 NOTE — PROGRESS NOTE ADULT - PROBLEM SELECTOR PLAN 1
-Hydralazine 37.5mg Q8H (hold SBP<100)  -ISDN 10mg Q8H (hold SBP<100)  -Start Bumex 2mg PO BID  -R/LHC this admission pending SCr    - TTE revealed severe AS; Plan for TAVR evaluation this admission (will be limited by Cr)    Strict I/O  Daily Standing Weights  Monitor Lytes Replete K>4.0andMg>2.0  Trend SCr  Appreciate Nephrology recommendations   Appreciate Heme recommendations

## 2025-06-07 NOTE — PROGRESS NOTE ADULT - SUBJECTIVE AND OBJECTIVE BOX
Patient seen and examined at bedside. Resting in bed on nasal cannula Family at bedside        VITAL:  T(C): , Max: 37.1 (06-06-25 @ 16:00)  T(F): , Max: 98.8 (06-06-25 @ 16:00)  HR: 95 (06-07-25 @ 11:25)  BP: 125/66 (06-07-25 @ 11:25)  BP(mean): 86 (06-06-25 @ 21:00)  RR: 17 (06-07-25 @ 11:25)  SpO2: 97% (06-07-25 @ 11:25)  Wt(kg): --      PHYSICAL EXAM:  Constitutional: NAD  HEENT: EOMI  Neck:  No JVD, supple   Respiratory: diminished   Cardiovascular: RRR  Gastrointestinal: + BS, soft, NT, ND  Extremities:tr peripheral edema, + peripheral pulses  Neurological: A/O x 3, CN2-12 intact  Psychiatric: Normal mood, normal affect  : + Min  Skin: No rashes, C/D/I        LABS:                        8.6    2.19  )-----------( 62       ( 07 Jun 2025 07:25 )             26.4     Na(143)/K(4.1)/Cl(102)/HCO3(24)/BUN(60)/Cr(3.06)Glu(188)/Ca(8.2)/Mg(2.40)/PO4(4.7)    06-07 @ 07:25  Na(147)/K(3.6)/Cl(103)/HCO3(26)/BUN(50)/Cr(2.97)Glu(148)/Ca(8.3)/Mg(2.30)/PO4(4.4)    06-06 @ 05:57  Na(147)/K(3.7)/Cl(106)/HCO3(25)/BUN(42)/Cr(2.76)Glu(110)/Ca(8.0)/Mg(2.30)/PO4(4.0)    06-05 @ 03:26  Na(144)/K(3.6)/Cl(105)/HCO3(24)/BUN(40)/Cr(2.54)Glu(120)/Ca(8.1)/Mg(2.40)/PO4(4.1)    06-04 @ 15:45    Urinalysis Basic - ( 07 Jun 2025 07:25 )    Color: x / Appearance: x / SG: x / pH: x  Gluc: 188 mg/dL / Ketone: x  / Bili: x / Urobili: x   Blood: x / Protein: x / Nitrite: x   Leuk Esterase: x / RBC: x / WBC x   Sq Epi: x / Non Sq Epi: x / Bacteria: x

## 2025-06-07 NOTE — PROGRESS NOTE ADULT - SUBJECTIVE AND OBJECTIVE BOX
Grupo Church MD  [employer]  Pager [phone]  Email: [email or web address]  Available on [employer] Teams        PROGRESS NOTE:     Patient is a 84y old  Male who presents with a chief complaint of Fluid overload (07 Jun 2025 12:48)      SUBJECTIVE / OVERNIGHT EVENTS:  Patient seen and examined this morning. Feeling better, transferred out of the CCU.   ADDITIONAL REVIEW OF SYSTEMS:  Breathing improved. No f/c      MEDICATIONS  (STANDING):  allopurinol 200 milliGRAM(s) Oral daily  apixaban      apixaban 2.5 milliGRAM(s) Oral two times a day  chlorhexidine 2% Cloths 1 Application(s) Topical <User Schedule>  clopidogrel Tablet 75 milliGRAM(s) Oral daily  gabapentin 300 milliGRAM(s) Oral two times a day  hydrALAZINE 37.5 milliGRAM(s) Oral three times a day  isosorbide   dinitrate Tablet (ISORDIL) 10 milliGRAM(s) Oral three times a day  levoFLOXacin  Tablet 250 milliGRAM(s) Oral every 24 hours  pantoprazole    Tablet 40 milliGRAM(s) Oral before breakfast  polyethylene glycol 3350 17 Gram(s) Oral daily  predniSONE   Tablet 40 milliGRAM(s) Oral daily  rosuvastatin 20 milliGRAM(s) Oral at bedtime  senna 2 Tablet(s) Oral at bedtime    MEDICATIONS  (PRN):  acetaminophen     Tablet .. 650 milliGRAM(s) Oral every 6 hours PRN Moderate Pain (4 - 6)  melatonin 3 milliGRAM(s) Oral at bedtime PRN Insomnia      CAPILLARY BLOOD GLUCOSE        I&O's Summary    06 Jun 2025 07:01  -  07 Jun 2025 07:00  --------------------------------------------------------  IN: 1400 mL / OUT: 2400 mL / NET: -1000 mL        PHYSICAL EXAM:  Vital Signs Last 24 Hrs  T(C): 36.8 (07 Jun 2025 11:25), Max: 37.1 (06 Jun 2025 16:00)  T(F): 98.2 (07 Jun 2025 11:25), Max: 98.8 (06 Jun 2025 16:00)  HR: 95 (07 Jun 2025 13:19) (76 - 98)  BP: 124/61 (07 Jun 2025 13:19) (108/65 - 134/76)  BP(mean): 86 (06 Jun 2025 21:00) (80 - 98)  RR: 17 (07 Jun 2025 11:25) (12 - 20)  SpO2: 97% (07 Jun 2025 11:25) (94% - 100%)    Parameters below as of 07 Jun 2025 11:25  Patient On (Oxygen Delivery Method): nasal cannula  O2 Flow (L/min): 4      CONSTITUTIONAL: NAD, pleasant, on NC for supplemental O2  RESPIRATORY: Normal respiratory effort; no respiratory distress, CTAB  CARDIOVASCULAR: No visible JVD, No lower extremity edema; S1S2, no m,r,g  ABDOMEN: Not guarding, does not appear distended, BS+  MUSCLOSKELETAL: no clubbing or cyanosis of digits; no joint swelling   PSYCH: AOx3    LABS:                        8.6    2.19  )-----------( 62       ( 07 Jun 2025 07:25 )             26.4     06-07    143  |  102  |  60[H]  ----------------------------<  188[H]  4.1   |  24  |  3.06[H]    Ca    8.2[L]      07 Jun 2025 07:25  Phos  4.7     06-07  Mg     2.40     06-07    TPro  6.6  /  Alb  3.9  /  TBili  0.7  /  DBili  x   /  AST  13  /  ALT  7   /  AlkPhos  86  06-07          Urinalysis Basic - ( 07 Jun 2025 07:25 )    Color: x / Appearance: x / SG: x / pH: x  Gluc: 188 mg/dL / Ketone: x  / Bili: x / Urobili: x   Blood: x / Protein: x / Nitrite: x   Leuk Esterase: x / RBC: x / WBC x   Sq Epi: x / Non Sq Epi: x / Bacteria: x          RADIOLOGY & ADDITIONAL TESTS:  Results Reviewed:   Imaging Personally Reviewed:  Electrocardiogram Personally Reviewed:    COORDINATION OF CARE:  Care Discussed with Consultants/Other Providers [Y/N]:  Prior or Outpatient Records Reviewed [Y/N]:

## 2025-06-07 NOTE — PROGRESS NOTE ADULT - ASSESSMENT
84-year-old male with a past medical history of myelodysplastic syndrome (MDS), hypertension (HTN), chronic kidney disease stage 3 (CKD 3), atrial fibrillation (A Fib), anemia, prostate cancer, gout, and obesity, presented with acute hypoxic respiratory failure requiring BiPAP secondary to an acute exacerbation of heart failure with reduced ejection fraction (HFrEF). He was transferred to the coronary care unit (CCU) and diuresed. He is now stabilized and has been transferred back to the medical floor.    Active Problems:    Myelodysplastic Syndrome (MDS)  Acute on chronic heart failure with reduced ejection fraction (HFrEF)  Acute hypoxic respiratory failure  Chronic kidney disease stage 3 (CKD 3)  Atrial fibrillation with rapid ventricular response (A Fib with RVR)  Hypercoagulable state  Anemia  Hypertension (HTN)  Prostate cancer  Gout  Obesity  Problem/Plan:    MDS: History of MDS; last chemotherapy on May 20th. 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 Per outpatient chart, anemia is also multifactorial (anemia of chronic disease/anemia of renal failure).Patient takes levofloxacin for neutropenia. Continue outpatient follow-up.    Acute on Chronic HFrEF/Acute Hypoxic Respiratory Failure: Hydralazine 37.5mg every 8 hours (hold if systolic blood pressure < 100 mmHg). Isosorbide dinitrate (ISDN) 10mg every 8 hours (hold if systolic blood pressure < 100 mmHg). Start bumetanide (Bumex) 2mg PO twice daily. Left heart catheterization (LHC) pending this admission,. Transthoracic echocardiogram (TTE) revealed severe aortic stenosis (AS); plan for transcatheter aortic valve replacement (TAVR) evaluation this admission (will be limited by creatinine). Continue clopidogrel (Plavix), statin, hydralazine, and ISDN. Strict intake and output monitoring. Daily standing weights. Monitor electrolytes; replete potassium to > 4.0 mEq/L and magnesium to > 2.0 mg/dL. Trend SCr.    CKD 3: Nephrology consulted. Recommendations appreciated    Hypercoagulable State/A Fib with RVR: On apixaban (Eliquis).    Anemia: Monitor as above (under MDS).    Hypertension: Continue medications as above for heart failure.

## 2025-06-08 LAB
ALBUMIN SERPL ELPH-MCNC: 3.8 G/DL — SIGNIFICANT CHANGE UP (ref 3.3–5)
ALP SERPL-CCNC: 79 U/L — SIGNIFICANT CHANGE UP (ref 40–120)
ALT FLD-CCNC: 9 U/L — SIGNIFICANT CHANGE UP (ref 4–41)
ANION GAP SERPL CALC-SCNC: 15 MMOL/L — HIGH (ref 7–14)
AST SERPL-CCNC: 13 U/L — SIGNIFICANT CHANGE UP (ref 4–40)
BASOPHILS # BLD AUTO: 0 K/UL — SIGNIFICANT CHANGE UP (ref 0–0.2)
BASOPHILS NFR BLD AUTO: 0 % — SIGNIFICANT CHANGE UP (ref 0–2)
BILIRUB SERPL-MCNC: 0.5 MG/DL — SIGNIFICANT CHANGE UP (ref 0.2–1.2)
BUN SERPL-MCNC: 66 MG/DL — HIGH (ref 7–23)
CALCIUM SERPL-MCNC: 7.7 MG/DL — LOW (ref 8.4–10.5)
CHLORIDE SERPL-SCNC: 100 MMOL/L — SIGNIFICANT CHANGE UP (ref 98–107)
CO2 SERPL-SCNC: 26 MMOL/L — SIGNIFICANT CHANGE UP (ref 22–31)
CREAT SERPL-MCNC: 2.81 MG/DL — HIGH (ref 0.5–1.3)
CULTURE RESULTS: SIGNIFICANT CHANGE UP
CULTURE RESULTS: SIGNIFICANT CHANGE UP
EGFR: 22 ML/MIN/1.73M2 — LOW
EGFR: 22 ML/MIN/1.73M2 — LOW
EOSINOPHIL # BLD AUTO: 0 K/UL — SIGNIFICANT CHANGE UP (ref 0–0.5)
EOSINOPHIL NFR BLD AUTO: 0 % — SIGNIFICANT CHANGE UP (ref 0–6)
GLUCOSE BLDC GLUCOMTR-MCNC: 188 MG/DL — HIGH (ref 70–99)
GLUCOSE BLDC GLUCOMTR-MCNC: 229 MG/DL — HIGH (ref 70–99)
GLUCOSE BLDC GLUCOMTR-MCNC: 309 MG/DL — HIGH (ref 70–99)
GLUCOSE BLDC GLUCOMTR-MCNC: 348 MG/DL — HIGH (ref 70–99)
GLUCOSE BLDC GLUCOMTR-MCNC: 371 MG/DL — HIGH (ref 70–99)
GLUCOSE BLDC GLUCOMTR-MCNC: 392 MG/DL — HIGH (ref 70–99)
GLUCOSE SERPL-MCNC: 200 MG/DL — HIGH (ref 70–99)
HCT VFR BLD CALC: 23.5 % — LOW (ref 39–50)
HGB BLD-MCNC: 7.8 G/DL — LOW (ref 13–17)
IANC: 1.18 K/UL — LOW (ref 1.8–7.4)
IMM GRANULOCYTES NFR BLD AUTO: 1 % — HIGH (ref 0–0.9)
LYMPHOCYTES # BLD AUTO: 0.54 K/UL — LOW (ref 1–3.3)
LYMPHOCYTES # BLD AUTO: 27.7 % — SIGNIFICANT CHANGE UP (ref 13–44)
MAGNESIUM SERPL-MCNC: 2.3 MG/DL — SIGNIFICANT CHANGE UP (ref 1.6–2.6)
MCHC RBC-ENTMCNC: 33.2 G/DL — SIGNIFICANT CHANGE UP (ref 32–36)
MCHC RBC-ENTMCNC: 34.4 PG — HIGH (ref 27–34)
MCV RBC AUTO: 103.5 FL — HIGH (ref 80–100)
MONOCYTES # BLD AUTO: 0.21 K/UL — SIGNIFICANT CHANGE UP (ref 0–0.9)
MONOCYTES NFR BLD AUTO: 10.8 % — SIGNIFICANT CHANGE UP (ref 2–14)
NEUTROPHILS # BLD AUTO: 1.18 K/UL — LOW (ref 1.8–7.4)
NEUTROPHILS NFR BLD AUTO: 60.5 % — SIGNIFICANT CHANGE UP (ref 43–77)
NRBC # BLD AUTO: 0.02 K/UL — HIGH (ref 0–0)
NRBC # FLD: 0.02 K/UL — HIGH (ref 0–0)
NRBC BLD AUTO-RTO: 1 /100 WBCS — HIGH (ref 0–0)
PHOSPHATE SERPL-MCNC: 5.1 MG/DL — HIGH (ref 2.5–4.5)
PLATELET # BLD AUTO: 54 K/UL — LOW (ref 150–400)
POTASSIUM SERPL-MCNC: 4.2 MMOL/L — SIGNIFICANT CHANGE UP (ref 3.5–5.3)
POTASSIUM SERPL-SCNC: 4.2 MMOL/L — SIGNIFICANT CHANGE UP (ref 3.5–5.3)
PROT SERPL-MCNC: 6.1 G/DL — SIGNIFICANT CHANGE UP (ref 6–8.3)
RBC # BLD: 2.27 M/UL — LOW (ref 4.2–5.8)
RBC # FLD: 23.7 % — HIGH (ref 10.3–14.5)
SODIUM SERPL-SCNC: 141 MMOL/L — SIGNIFICANT CHANGE UP (ref 135–145)
SPECIMEN SOURCE: SIGNIFICANT CHANGE UP
SPECIMEN SOURCE: SIGNIFICANT CHANGE UP
WBC # BLD: 1.95 K/UL — LOW (ref 3.8–10.5)
WBC # FLD AUTO: 1.95 K/UL — LOW (ref 3.8–10.5)

## 2025-06-08 PROCEDURE — 99233 SBSQ HOSP IP/OBS HIGH 50: CPT

## 2025-06-08 RX ORDER — DEXTROSE 50 % IN WATER 50 %
12.5 SYRINGE (ML) INTRAVENOUS ONCE
Refills: 0 | Status: DISCONTINUED | OUTPATIENT
Start: 2025-06-08 | End: 2025-06-13

## 2025-06-08 RX ORDER — GLUCAGON 3 MG/1
1 POWDER NASAL ONCE
Refills: 0 | Status: DISCONTINUED | OUTPATIENT
Start: 2025-06-08 | End: 2025-06-13

## 2025-06-08 RX ORDER — DEXTROSE 50 % IN WATER 50 %
25 SYRINGE (ML) INTRAVENOUS ONCE
Refills: 0 | Status: DISCONTINUED | OUTPATIENT
Start: 2025-06-08 | End: 2025-06-13

## 2025-06-08 RX ORDER — LACTULOSE 10 G/15ML
200 SOLUTION ORAL ONCE
Refills: 0 | Status: COMPLETED | OUTPATIENT
Start: 2025-06-08 | End: 2025-06-08

## 2025-06-08 RX ORDER — SODIUM CHLORIDE 9 G/1000ML
1000 INJECTION, SOLUTION INTRAVENOUS
Refills: 0 | Status: DISCONTINUED | OUTPATIENT
Start: 2025-06-08 | End: 2025-06-13

## 2025-06-08 RX ORDER — INSULIN GLARGINE-YFGN 100 [IU]/ML
5 INJECTION, SOLUTION SUBCUTANEOUS ONCE
Refills: 0 | Status: COMPLETED | OUTPATIENT
Start: 2025-06-08 | End: 2025-06-08

## 2025-06-08 RX ORDER — INSULIN GLARGINE-YFGN 100 [IU]/ML
5 INJECTION, SOLUTION SUBCUTANEOUS AT BEDTIME
Refills: 0 | Status: DISCONTINUED | OUTPATIENT
Start: 2025-06-09 | End: 2025-06-09

## 2025-06-08 RX ORDER — INSULIN LISPRO 100 U/ML
INJECTION, SOLUTION INTRAVENOUS; SUBCUTANEOUS
Refills: 0 | Status: DISCONTINUED | OUTPATIENT
Start: 2025-06-08 | End: 2025-06-13

## 2025-06-08 RX ORDER — INSULIN LISPRO 100 U/ML
INJECTION, SOLUTION INTRAVENOUS; SUBCUTANEOUS AT BEDTIME
Refills: 0 | Status: DISCONTINUED | OUTPATIENT
Start: 2025-06-08 | End: 2025-06-13

## 2025-06-08 RX ORDER — DEXTROSE 50 % IN WATER 50 %
15 SYRINGE (ML) INTRAVENOUS ONCE
Refills: 0 | Status: DISCONTINUED | OUTPATIENT
Start: 2025-06-08 | End: 2025-06-13

## 2025-06-08 RX ADMIN — Medication 37.5 MILLIGRAM(S): at 13:18

## 2025-06-08 RX ADMIN — LACTULOSE 200 GRAM(S): 10 SOLUTION ORAL at 18:52

## 2025-06-08 RX ADMIN — POLYETHYLENE GLYCOL 3350 17 GRAM(S): 17 POWDER, FOR SOLUTION ORAL at 11:13

## 2025-06-08 RX ADMIN — ISOSORBDIE DINITRATE 10 MILLIGRAM(S): 30 TABLET ORAL at 11:14

## 2025-06-08 RX ADMIN — INSULIN LISPRO 4: 100 INJECTION, SOLUTION INTRAVENOUS; SUBCUTANEOUS at 18:01

## 2025-06-08 RX ADMIN — Medication 37.5 MILLIGRAM(S): at 05:53

## 2025-06-08 RX ADMIN — Medication 40 MILLIGRAM(S): at 05:54

## 2025-06-08 RX ADMIN — INSULIN GLARGINE-YFGN 5 UNIT(S): 100 INJECTION, SOLUTION SUBCUTANEOUS at 16:03

## 2025-06-08 RX ADMIN — ISOSORBDIE DINITRATE 10 MILLIGRAM(S): 30 TABLET ORAL at 05:54

## 2025-06-08 RX ADMIN — APIXABAN 2.5 MILLIGRAM(S): 2.5 TABLET, FILM COATED ORAL at 18:00

## 2025-06-08 RX ADMIN — Medication 37.5 MILLIGRAM(S): at 21:02

## 2025-06-08 RX ADMIN — GABAPENTIN 300 MILLIGRAM(S): 400 CAPSULE ORAL at 05:53

## 2025-06-08 RX ADMIN — PREDNISONE 40 MILLIGRAM(S): 20 TABLET ORAL at 05:53

## 2025-06-08 RX ADMIN — INSULIN LISPRO 1: 100 INJECTION, SOLUTION INTRAVENOUS; SUBCUTANEOUS at 09:27

## 2025-06-08 RX ADMIN — ROSUVASTATIN CALCIUM 20 MILLIGRAM(S): 20 TABLET, FILM COATED ORAL at 21:03

## 2025-06-08 RX ADMIN — BUMETANIDE 2 MILLIGRAM(S): 1 TABLET ORAL at 18:00

## 2025-06-08 RX ADMIN — ISOSORBDIE DINITRATE 10 MILLIGRAM(S): 30 TABLET ORAL at 16:03

## 2025-06-08 RX ADMIN — APIXABAN 2.5 MILLIGRAM(S): 2.5 TABLET, FILM COATED ORAL at 05:53

## 2025-06-08 RX ADMIN — INSULIN LISPRO 5: 100 INJECTION, SOLUTION INTRAVENOUS; SUBCUTANEOUS at 13:17

## 2025-06-08 RX ADMIN — Medication 200 MILLIGRAM(S): at 11:13

## 2025-06-08 RX ADMIN — BUMETANIDE 2 MILLIGRAM(S): 1 TABLET ORAL at 05:58

## 2025-06-08 RX ADMIN — CLOPIDOGREL BISULFATE 75 MILLIGRAM(S): 75 TABLET, FILM COATED ORAL at 11:13

## 2025-06-08 RX ADMIN — GABAPENTIN 300 MILLIGRAM(S): 400 CAPSULE ORAL at 18:00

## 2025-06-08 RX ADMIN — Medication 2 TABLET(S): at 21:03

## 2025-06-08 RX ADMIN — Medication 1 APPLICATION(S): at 05:54

## 2025-06-08 NOTE — PROGRESS NOTE ADULT - SUBJECTIVE AND OBJECTIVE BOX
Patient seen and examined at bedside.  Resting comfortably in bed. Remains on nasal cannula      VITAL:  T(C): , Max: 36.8 (06-07-25 @ 20:35)  T(F): , Max: 98.3 (06-07-25 @ 20:35)  HR: 86 (06-08-25 @ 11:13)  BP: 120/71 (06-08-25 @ 11:13)  BP(mean): --  RR: 18 (06-08-25 @ 11:13)  SpO2: 96% (06-08-25 @ 11:13)  Wt(kg): --      PHYSICAL EXAM:  Constitutional: NAD  HEENT: EOMI  Neck:  No JVD, supple   Respiratory: diminished   Cardiovascular: RRR  Gastrointestinal: + BS, soft, NT, ND  Extremities:tr peripheral edema, + peripheral pulses  Neurological: A/O x 3, CN2-12 intact  Psychiatric: Normal mood, normal affect  : + Min  Skin: No rashes, C/D/I      LABS:                        7.8    1.95  )-----------( 54       ( 08 Jun 2025 04:11 )             23.5     Na(141)/K(4.2)/Cl(100)/HCO3(26)/BUN(66)/Cr(2.81)Glu(200)/Ca(7.7)/Mg(2.30)/PO4(5.1)    06-08 @ 04:11  Na(143)/K(4.1)/Cl(102)/HCO3(24)/BUN(60)/Cr(3.06)Glu(188)/Ca(8.2)/Mg(2.40)/PO4(4.7)    06-07 @ 07:25  Na(147)/K(3.6)/Cl(103)/HCO3(26)/BUN(50)/Cr(2.97)Glu(148)/Ca(8.3)/Mg(2.30)/PO4(4.4)    06-06 @ 05:57    Urinalysis Basic - ( 08 Jun 2025 04:11 )    Color: x / Appearance: x / SG: x / pH: x  Gluc: 200 mg/dL / Ketone: x  / Bili: x / Urobili: x   Blood: x / Protein: x / Nitrite: x   Leuk Esterase: x / RBC: x / WBC x   Sq Epi: x / Non Sq Epi: x / Bacteria: x

## 2025-06-08 NOTE — PROGRESS NOTE ADULT - PROBLEM SELECTOR PLAN 1
-Hydralazine 37.5mg Q8H (hold SBP<100)  -ISDN 10mg Q8H (hold SBP<100)  -Bumex 2mg PO BID  -R/LHC this admission pending SCr    - TTE revealed severe AS; Plan for TAVR evaluation this admission (will be limited by Cr)    Strict I/O  Daily Standing Weights  Monitor Lytes Replete K>4.0andMg>2.0  Trend SCr  Appreciate Nephrology recommendations   Appreciate Heme recommendations

## 2025-06-08 NOTE — PROGRESS NOTE ADULT - ASSESSMENT
84-year-old male with a past medical history of myelodysplastic syndrome (MDS), hypertension (HTN), chronic kidney disease stage 3 (CKD 3), atrial fibrillation (A Fib), anemia, prostate cancer, gout, and obesity, presented with acute hypoxic respiratory failure requiring BiPAP secondary to an acute exacerbation of heart failure with reduced ejection fraction (HFrEF). He was transferred to the coronary care unit (CCU) and diuresed. He is now stabilized and has been transferred back to the medical floor.    Active Problems:    Myelodysplastic Syndrome (MDS)  Acute on chronic heart failure with reduced ejection fraction (HFrEF)  Acute hypoxic respiratory failure  Chronic kidney disease stage 3 (CKD 3)  Atrial fibrillation with rapid ventricular response (A Fib with RVR)  Hypercoagulable state  Anemia  Hypertension (HTN)  Prostate cancer  Gout  Steroid induced hyperglycemia  Obesity    MDS: History of MDS;   last chemotherapy on May 20th. 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 Per outpatient chart, anemia is also multifactorial (anemia of chronic disease/anemia of renal failure).Patient takes levofloxacin for neutropenia. Continue outpatient follow-up.    Acute on Chronic HFrEF/Acute Hypoxic Respiratory Failure:   Hydralazine 37.5mg every 8 hours (hold if systolic blood pressure < 100 mmHg). Isosorbide dinitrate (ISDN) 10mg every 8 hours (hold if systolic blood pressure < 100 mmHg). Start bumetanide (Bumex) 2mg PO twice daily. Left heart catheterization (LHC) pending this admission,. Transthoracic echocardiogram (TTE) revealed severe aortic stenosis (AS);   Pending improvement with kidney function -plan for transcatheter aortic valve replacement (TAVR) evaluation this admission (will be limited by creatinine). Continue clopidogrel (Plavix), statin, hydralazine, and ISDN. Strict intake and output monitoring. Daily standing weights. Monitor electrolytes; replete potassium to > 4.0 mEq/L and magnesium to > 2.0 mg/dL. Trend SCr.  Continue Bumex    Steroid induced hyperglycemia  FS as high as 392.  Patient placed on ISS.  with standing lantus for now continue to follow/monitor closely.    Acute on chronic CKD 3:   Nephrology consulted. Recommendations appreciated  Continue diuresis       Hypercoagulable State/A Fib with RVR:   On apixaban (Eliquis).    Anemia:   Monitor as above (under MDS).    Hypertension:  Continue medications as above for heart failure.

## 2025-06-08 NOTE — PROGRESS NOTE ADULT - ASSESSMENT
85 Y/O M PMH MDS, HTN, CKD 3, A Fib, Anemia, HTN, Prostate CA, gout, obesity, presents with 3 days of orthopnea, increasing shortness of breath and weight gain  CHF   CKD stage 4   Anemia, thrombocytopenia, leukopenia - Pancytopenia   Severe AS   Hypernatremia     Plan:    1 Renal- Cr 2.81, Bumex 2mg PO BID per HF rec's ;  Allow oral hydration(this will not lead to chf)   Trend serum creatinine and strict ins and outs   Encourage po intake   No IVF's  2 CVS- BP stable, now off nitro gtt and not on BBlockers at present; on Hydralazine 37.5mg po tid and Isordil 10 mg po tid   Mgmt per HF   Right and left heart cath likely on Monday - No renal objection   TTE revealed severe AS; Plan for TAVR evaluation this admission  3 Pulm- now off bipap and on nasal cannula   May need CT of the chest to assess the effusions and see if he would benefit from possible thoracentesis   4 Heme - Serial cbc's      Digna Hand NP  [facility]   [phone]

## 2025-06-08 NOTE — PROGRESS NOTE ADULT - SUBJECTIVE AND OBJECTIVE BOX
Grupo Church MD  [employer]  Pager [phone]  Email: [email or web address]  Available on [employer] Teams        PROGRESS NOTE:     Patient is a 84y old  Male who presents with a chief complaint of Fluid overload (08 Jun 2025 12:30)      SUBJECTIVE / OVERNIGHT EVENTS:  The patient was seen and examined this morning. He reports continued improvement, particularly in his breathing. However, he still requires supplemental oxygen via nasal cannula for hypoxia and dyspnea. Patient also with new steroid induced hyperglycemia    MEDICATIONS  (STANDING):  allopurinol 200 milliGRAM(s) Oral daily  apixaban      apixaban 2.5 milliGRAM(s) Oral two times a day  bumetanide 2 milliGRAM(s) Oral every 12 hours  chlorhexidine 2% Cloths 1 Application(s) Topical <User Schedule>  clopidogrel Tablet 75 milliGRAM(s) Oral daily  dextrose 5%. 1000 milliLiter(s) (50 mL/Hr) IV Continuous <Continuous>  dextrose 5%. 1000 milliLiter(s) (100 mL/Hr) IV Continuous <Continuous>  dextrose 50% Injectable 25 Gram(s) IV Push once  dextrose 50% Injectable 12.5 Gram(s) IV Push once  dextrose 50% Injectable 25 Gram(s) IV Push once  gabapentin 300 milliGRAM(s) Oral two times a day  glucagon  Injectable 1 milliGRAM(s) IntraMuscular once  hydrALAZINE 37.5 milliGRAM(s) Oral three times a day  insulin glargine Injectable (LANTUS) 5 Unit(s) SubCutaneous once  insulin lispro (ADMELOG) corrective regimen sliding scale   SubCutaneous three times a day before meals  insulin lispro (ADMELOG) corrective regimen sliding scale   SubCutaneous at bedtime  isosorbide   dinitrate Tablet (ISORDIL) 10 milliGRAM(s) Oral three times a day  lactulose Retention Enema 200 Gram(s) Rectal once  levoFLOXacin  Tablet 250 milliGRAM(s) Oral every 24 hours  pantoprazole    Tablet 40 milliGRAM(s) Oral before breakfast  polyethylene glycol 3350 17 Gram(s) Oral daily  predniSONE   Tablet 40 milliGRAM(s) Oral daily  rosuvastatin 20 milliGRAM(s) Oral at bedtime  senna 2 Tablet(s) Oral at bedtime    MEDICATIONS  (PRN):  acetaminophen     Tablet .. 650 milliGRAM(s) Oral every 6 hours PRN Moderate Pain (4 - 6)  dextrose Oral Gel 15 Gram(s) Oral once PRN Blood Glucose LESS THAN 70 milliGRAM(s)/deciliter  melatonin 3 milliGRAM(s) Oral at bedtime PRN Insomnia      CAPILLARY BLOOD GLUCOSE      POCT Blood Glucose.: 392 mg/dL (08 Jun 2025 12:45)  POCT Blood Glucose.: 348 mg/dL (08 Jun 2025 12:43)  POCT Blood Glucose.: 188 mg/dL (08 Jun 2025 08:38)    I&O's Summary    07 Jun 2025 07:01  -  08 Jun 2025 07:00  --------------------------------------------------------  IN: 600 mL / OUT: 500 mL / NET: 100 mL        PHYSICAL EXAM:  Vital Signs Last 24 Hrs  T(C): 36.7 (08 Jun 2025 13:17), Max: 36.8 (07 Jun 2025 20:35)  T(F): 98 (08 Jun 2025 13:17), Max: 98.3 (07 Jun 2025 20:35)  HR: 87 (08 Jun 2025 13:17) (72 - 91)  BP: 132/75 (08 Jun 2025 13:17) (102/54 - 132/75)  BP(mean): --  RR: 18 (08 Jun 2025 13:17) (17 - 18)  SpO2: 97% (08 Jun 2025 13:17) (95% - 99%)    Parameters below as of 08 Jun 2025 13:17  Patient On (Oxygen Delivery Method): nasal cannula  O2 Flow (L/min): 4    CONSTITUTIONAL: NAD, pleasant, on NC for supplemental O2  RESPIRATORY: Normal respiratory effort; no respiratory distress, CTAB  CARDIOVASCULAR: No visible JVD, No lower extremity edema; S1S2, no m,r,g  ABDOMEN: Not guarding, does not appear distended, BS+  MUSCLOSKELETAL: no clubbing or cyanosis of digits; no joint swelling   PSYCH: AOx3    LABS:                        7.8    1.95  )-----------( 54       ( 08 Jun 2025 04:11 )             23.5     06-08    141  |  100  |  66[H]  ----------------------------<  200[H]  4.2   |  26  |  2.81[H]    Ca    7.7[L]      08 Jun 2025 04:11  Phos  5.1     06-08  Mg     2.30     06-08    TPro  6.1  /  Alb  3.8  /  TBili  0.5  /  DBili  x   /  AST  13  /  ALT  9   /  AlkPhos  79  06-08          Urinalysis Basic - ( 08 Jun 2025 04:11 )    Color: x / Appearance: x / SG: x / pH: x  Gluc: 200 mg/dL / Ketone: x  / Bili: x / Urobili: x   Blood: x / Protein: x / Nitrite: x   Leuk Esterase: x / RBC: x / WBC x   Sq Epi: x / Non Sq Epi: x / Bacteria: x          RADIOLOGY & ADDITIONAL TESTS:  Results Reviewed:   Imaging Personally Reviewed:  Electrocardiogram Personally Reviewed:    COORDINATION OF CARE:  Care Discussed with Consultants/Other Providers [Y/N]:  Prior or Outpatient Records Reviewed [Y/N]:

## 2025-06-09 ENCOUNTER — RESULT REVIEW (OUTPATIENT)
Age: 85
End: 2025-06-09

## 2025-06-09 LAB
ALBUMIN SERPL ELPH-MCNC: 3.8 G/DL — SIGNIFICANT CHANGE UP (ref 3.3–5)
ALP SERPL-CCNC: 80 U/L — SIGNIFICANT CHANGE UP (ref 40–120)
ALT FLD-CCNC: 11 U/L — SIGNIFICANT CHANGE UP (ref 4–41)
ANION GAP SERPL CALC-SCNC: 17 MMOL/L — HIGH (ref 7–14)
AST SERPL-CCNC: 16 U/L — SIGNIFICANT CHANGE UP (ref 4–40)
BASOPHILS # BLD AUTO: 0 K/UL — SIGNIFICANT CHANGE UP (ref 0–0.2)
BASOPHILS NFR BLD AUTO: 0 % — SIGNIFICANT CHANGE UP (ref 0–2)
BILIRUB SERPL-MCNC: 0.7 MG/DL — SIGNIFICANT CHANGE UP (ref 0.2–1.2)
BUN SERPL-MCNC: 70 MG/DL — HIGH (ref 7–23)
CALCIUM SERPL-MCNC: 7.8 MG/DL — LOW (ref 8.4–10.5)
CHLORIDE SERPL-SCNC: 101 MMOL/L — SIGNIFICANT CHANGE UP (ref 98–107)
CO2 SERPL-SCNC: 27 MMOL/L — SIGNIFICANT CHANGE UP (ref 22–31)
CREAT SERPL-MCNC: 2.95 MG/DL — HIGH (ref 0.5–1.3)
EGFR: 20 ML/MIN/1.73M2 — LOW
EGFR: 20 ML/MIN/1.73M2 — LOW
EOSINOPHIL # BLD AUTO: 0.02 K/UL — SIGNIFICANT CHANGE UP (ref 0–0.5)
EOSINOPHIL NFR BLD AUTO: 0.8 % — SIGNIFICANT CHANGE UP (ref 0–6)
GLUCOSE BLDC GLUCOMTR-MCNC: 185 MG/DL — HIGH (ref 70–99)
GLUCOSE BLDC GLUCOMTR-MCNC: 279 MG/DL — HIGH (ref 70–99)
GLUCOSE BLDC GLUCOMTR-MCNC: 347 MG/DL — HIGH (ref 70–99)
GLUCOSE BLDC GLUCOMTR-MCNC: 361 MG/DL — HIGH (ref 70–99)
GLUCOSE SERPL-MCNC: 119 MG/DL — HIGH (ref 70–99)
HCT VFR BLD CALC: 27.1 % — LOW (ref 39–50)
HGB BLD-MCNC: 9 G/DL — LOW (ref 13–17)
IANC: 1.16 K/UL — LOW (ref 1.8–7.4)
IMM GRANULOCYTES NFR BLD AUTO: 2 % — HIGH (ref 0–0.9)
LYMPHOCYTES # BLD AUTO: 0.97 K/UL — LOW (ref 1–3.3)
LYMPHOCYTES # BLD AUTO: 39.8 % — SIGNIFICANT CHANGE UP (ref 13–44)
MAGNESIUM SERPL-MCNC: 2.4 MG/DL — SIGNIFICANT CHANGE UP (ref 1.6–2.6)
MCHC RBC-ENTMCNC: 33.2 G/DL — SIGNIFICANT CHANGE UP (ref 32–36)
MCHC RBC-ENTMCNC: 34.6 PG — HIGH (ref 27–34)
MCV RBC AUTO: 104.2 FL — HIGH (ref 80–100)
MONOCYTES # BLD AUTO: 0.24 K/UL — SIGNIFICANT CHANGE UP (ref 0–0.9)
MONOCYTES NFR BLD AUTO: 9.8 % — SIGNIFICANT CHANGE UP (ref 2–14)
NEUTROPHILS # BLD AUTO: 1.16 K/UL — LOW (ref 1.8–7.4)
NEUTROPHILS NFR BLD AUTO: 47.6 % — SIGNIFICANT CHANGE UP (ref 43–77)
NRBC # BLD AUTO: 0.03 K/UL — HIGH (ref 0–0)
NRBC # FLD: 0.03 K/UL — HIGH (ref 0–0)
NRBC BLD AUTO-RTO: 1 /100 WBCS — HIGH (ref 0–0)
PHOSPHATE SERPL-MCNC: 5.5 MG/DL — HIGH (ref 2.5–4.5)
PLATELET # BLD AUTO: 96 K/UL — LOW (ref 150–400)
POTASSIUM SERPL-MCNC: 4 MMOL/L — SIGNIFICANT CHANGE UP (ref 3.5–5.3)
POTASSIUM SERPL-SCNC: 4 MMOL/L — SIGNIFICANT CHANGE UP (ref 3.5–5.3)
PROT SERPL-MCNC: 6.6 G/DL — SIGNIFICANT CHANGE UP (ref 6–8.3)
RBC # BLD: 2.6 M/UL — LOW (ref 4.2–5.8)
RBC # FLD: 24.5 % — HIGH (ref 10.3–14.5)
SODIUM SERPL-SCNC: 145 MMOL/L — SIGNIFICANT CHANGE UP (ref 135–145)
WBC # BLD: 2.44 K/UL — LOW (ref 3.8–10.5)
WBC # FLD AUTO: 2.44 K/UL — LOW (ref 3.8–10.5)

## 2025-06-09 PROCEDURE — 99233 SBSQ HOSP IP/OBS HIGH 50: CPT

## 2025-06-09 PROCEDURE — 99232 SBSQ HOSP IP/OBS MODERATE 35: CPT

## 2025-06-09 PROCEDURE — 93308 TTE F-UP OR LMTD: CPT | Mod: 26,GC

## 2025-06-09 RX ORDER — METOPROLOL SUCCINATE 50 MG/1
12.5 TABLET, EXTENDED RELEASE ORAL DAILY
Refills: 0 | Status: DISCONTINUED | OUTPATIENT
Start: 2025-06-10 | End: 2025-06-13

## 2025-06-09 RX ORDER — INSULIN GLARGINE-YFGN 100 [IU]/ML
10 INJECTION, SOLUTION SUBCUTANEOUS AT BEDTIME
Refills: 0 | Status: DISCONTINUED | OUTPATIENT
Start: 2025-06-09 | End: 2025-06-13

## 2025-06-09 RX ADMIN — INSULIN LISPRO 4: 100 INJECTION, SOLUTION INTRAVENOUS; SUBCUTANEOUS at 12:53

## 2025-06-09 RX ADMIN — ROSUVASTATIN CALCIUM 20 MILLIGRAM(S): 20 TABLET, FILM COATED ORAL at 22:45

## 2025-06-09 RX ADMIN — POLYETHYLENE GLYCOL 3350 17 GRAM(S): 17 POWDER, FOR SOLUTION ORAL at 11:50

## 2025-06-09 RX ADMIN — ISOSORBDIE DINITRATE 10 MILLIGRAM(S): 30 TABLET ORAL at 16:19

## 2025-06-09 RX ADMIN — BUMETANIDE 2 MILLIGRAM(S): 1 TABLET ORAL at 17:38

## 2025-06-09 RX ADMIN — INSULIN LISPRO 1: 100 INJECTION, SOLUTION INTRAVENOUS; SUBCUTANEOUS at 09:26

## 2025-06-09 RX ADMIN — INSULIN GLARGINE-YFGN 10 UNIT(S): 100 INJECTION, SOLUTION SUBCUTANEOUS at 22:45

## 2025-06-09 RX ADMIN — GABAPENTIN 300 MILLIGRAM(S): 400 CAPSULE ORAL at 05:35

## 2025-06-09 RX ADMIN — Medication 40 MILLIGRAM(S): at 05:36

## 2025-06-09 RX ADMIN — Medication 37.5 MILLIGRAM(S): at 14:02

## 2025-06-09 RX ADMIN — ISOSORBDIE DINITRATE 10 MILLIGRAM(S): 30 TABLET ORAL at 05:37

## 2025-06-09 RX ADMIN — INSULIN LISPRO 5: 100 INJECTION, SOLUTION INTRAVENOUS; SUBCUTANEOUS at 17:36

## 2025-06-09 RX ADMIN — Medication 1 APPLICATION(S): at 05:37

## 2025-06-09 RX ADMIN — PREDNISONE 40 MILLIGRAM(S): 20 TABLET ORAL at 05:36

## 2025-06-09 RX ADMIN — APIXABAN 2.5 MILLIGRAM(S): 2.5 TABLET, FILM COATED ORAL at 17:37

## 2025-06-09 RX ADMIN — Medication 2 TABLET(S): at 22:45

## 2025-06-09 RX ADMIN — Medication 200 MILLIGRAM(S): at 11:51

## 2025-06-09 RX ADMIN — Medication 50 MILLIGRAM(S): at 22:44

## 2025-06-09 RX ADMIN — GABAPENTIN 300 MILLIGRAM(S): 400 CAPSULE ORAL at 17:37

## 2025-06-09 RX ADMIN — ISOSORBDIE DINITRATE 10 MILLIGRAM(S): 30 TABLET ORAL at 11:50

## 2025-06-09 RX ADMIN — APIXABAN 2.5 MILLIGRAM(S): 2.5 TABLET, FILM COATED ORAL at 05:36

## 2025-06-09 RX ADMIN — INSULIN LISPRO 1: 100 INJECTION, SOLUTION INTRAVENOUS; SUBCUTANEOUS at 22:45

## 2025-06-09 RX ADMIN — BUMETANIDE 2 MILLIGRAM(S): 1 TABLET ORAL at 05:35

## 2025-06-09 RX ADMIN — CLOPIDOGREL BISULFATE 75 MILLIGRAM(S): 75 TABLET, FILM COATED ORAL at 11:51

## 2025-06-09 RX ADMIN — Medication 37.5 MILLIGRAM(S): at 05:36

## 2025-06-09 NOTE — PROGRESS NOTE ADULT - SUBJECTIVE AND OBJECTIVE BOX
Patient seen and examined in bed.  No new complaints.      REVIEW OF SYSTEMS:  As per HPI, otherwise 8 full 10 ROS were unremarkable    MEDICATIONS  (STANDING):  allopurinol 200 milliGRAM(s) Oral daily  apixaban      apixaban 2.5 milliGRAM(s) Oral two times a day  bumetanide 2 milliGRAM(s) Oral every 12 hours  chlorhexidine 2% Cloths 1 Application(s) Topical <User Schedule>  clopidogrel Tablet 75 milliGRAM(s) Oral daily  dextrose 5%. 1000 milliLiter(s) (50 mL/Hr) IV Continuous <Continuous>  dextrose 5%. 1000 milliLiter(s) (100 mL/Hr) IV Continuous <Continuous>  dextrose 50% Injectable 25 Gram(s) IV Push once  dextrose 50% Injectable 12.5 Gram(s) IV Push once  dextrose 50% Injectable 25 Gram(s) IV Push once  gabapentin 300 milliGRAM(s) Oral two times a day  glucagon  Injectable 1 milliGRAM(s) IntraMuscular once  hydrALAZINE 37.5 milliGRAM(s) Oral three times a day  insulin glargine Injectable (LANTUS) 5 Unit(s) SubCutaneous at bedtime  insulin lispro (ADMELOG) corrective regimen sliding scale   SubCutaneous three times a day before meals  insulin lispro (ADMELOG) corrective regimen sliding scale   SubCutaneous at bedtime  isosorbide   dinitrate Tablet (ISORDIL) 10 milliGRAM(s) Oral three times a day  levoFLOXacin  Tablet 250 milliGRAM(s) Oral every 24 hours  pantoprazole    Tablet 40 milliGRAM(s) Oral before breakfast  polyethylene glycol 3350 17 Gram(s) Oral daily  rosuvastatin 20 milliGRAM(s) Oral at bedtime  senna 2 Tablet(s) Oral at bedtime      VITAL:  T(C): , Max: 36.8 (06-09-25 @ 00:40)  T(F): , Max: 98.2 (06-09-25 @ 00:40)  HR: 80 (06-09-25 @ 05:00)  BP: 135/78 (06-09-25 @ 05:00)  BP(mean): --  RR: 16 (06-09-25 @ 05:00)  SpO2: 96% (06-09-25 @ 05:00)  Wt(kg): --    I and O's:    06-08 @ 07:01  -  06-09 @ 07:00  --------------------------------------------------------  IN: 550 mL / OUT: 1850 mL / NET: -1300 mL          PHYSICAL EXAM:    Constitutional: NAD  HEENT: PERRLA, EOMI,  MMM  Neck: No LAD, No JVD  Respiratory: CTAB  Cardiovascular: S1 and S2  Gastrointestinal: BS+, soft, NT/ND  Extremities: No peripheral edema  Neurological: A/O x 3, no focal deficits  Psychiatric: Normal mood, normal affect  : No Min  Skin: No rashes  Access: Not applicable    LABS:                        9.0    2.44  )-----------( 96       ( 09 Jun 2025 06:50 )             27.1     06-09    145  |  101  |  70[H]  ----------------------------<  119[H]  4.0   |  27  |  2.95[H]    Ca    7.8[L]      09 Jun 2025 06:50  Phos  5.5     06-09  Mg     2.40     06-09    TPro  6.6  /  Alb  3.8  /  TBili  0.7  /  DBili  x   /  AST  16  /  ALT  11  /  AlkPhos  80  06-09      Urine Studies:  Urinalysis Basic - ( 09 Jun 2025 06:50 )    Color: x / Appearance: x / SG: x / pH: x  Gluc: 119 mg/dL / Ketone: x  / Bili: x / Urobili: x   Blood: x / Protein: x / Nitrite: x   Leuk Esterase: x / RBC: x / WBC x   Sq Epi: x / Non Sq Epi: x / Bacteria: x          Assessment and Plan:   · Assessment	  85 Y/O M PMH MDS, HTN, CKD 3, A Fib, Anemia, HTN, Prostate CA, gout, obesity, presents with 3 days of orthopnea, increasing shortness of breath and weight gain  CHF   CKD stage 4   Anemia, thrombocytopenia, leukopenia - Pancytopenia   Severe AS   Hypernatremia     Plan:    1 Renal- azotemia slightly higher though overall same/stable. Continue Bumex 2mg PO BID per HF rec's for now;  Allow oral hydration(this will not lead to chf)   Trend serum creatinine and strict ins and outs   Encourage po intake   No IVF's  2 CVS- BP stable, now off nitro gtt and not on BBlockers at present; on Hydralazine 37.5mg po tid and Isordil 10 mg po tid   Mgmt per HF   Right and left heart cath TBD- no renal objection if renal fxn same/better  TTE revealed severe AS; Plan for TAVR evaluation this admission  3 Pulm- now off bipap and on nasal cannula   May need CT of the chest to assess the effusions and see if he would benefit from possible thoracentesis   4 Heme - Serial cbc's; Hgb improved     Mayra Haywood NP  [facility]   [phone]    Patient seen and examined in bed.  No new complaints.      REVIEW OF SYSTEMS:  As per HPI, otherwise 8 full 10 ROS were unremarkable.    MEDICATIONS  (STANDING):  allopurinol 200 milliGRAM(s) Oral daily  apixaban      apixaban 2.5 milliGRAM(s) Oral two times a day  bumetanide 2 milliGRAM(s) Oral every 12 hours  chlorhexidine 2% Cloths 1 Application(s) Topical <User Schedule>  clopidogrel Tablet 75 milliGRAM(s) Oral daily  dextrose 5%. 1000 milliLiter(s) (50 mL/Hr) IV Continuous <Continuous>  dextrose 5%. 1000 milliLiter(s) (100 mL/Hr) IV Continuous <Continuous>  dextrose 50% Injectable 25 Gram(s) IV Push once  dextrose 50% Injectable 12.5 Gram(s) IV Push once  dextrose 50% Injectable 25 Gram(s) IV Push once  gabapentin 300 milliGRAM(s) Oral two times a day  glucagon  Injectable 1 milliGRAM(s) IntraMuscular once  hydrALAZINE 37.5 milliGRAM(s) Oral three times a day  insulin glargine Injectable (LANTUS) 5 Unit(s) SubCutaneous at bedtime  insulin lispro (ADMELOG) corrective regimen sliding scale   SubCutaneous three times a day before meals  insulin lispro (ADMELOG) corrective regimen sliding scale   SubCutaneous at bedtime  isosorbide   dinitrate Tablet (ISORDIL) 10 milliGRAM(s) Oral three times a day  levoFLOXacin  Tablet 250 milliGRAM(s) Oral every 24 hours  pantoprazole    Tablet 40 milliGRAM(s) Oral before breakfast  polyethylene glycol 3350 17 Gram(s) Oral daily  rosuvastatin 20 milliGRAM(s) Oral at bedtime  senna 2 Tablet(s) Oral at bedtime      VITAL:  T(C): , Max: 36.8 (06-09-25 @ 00:40)  T(F): , Max: 98.2 (06-09-25 @ 00:40)  HR: 80 (06-09-25 @ 05:00)  BP: 135/78 (06-09-25 @ 05:00)  BP(mean): --  RR: 16 (06-09-25 @ 05:00)  SpO2: 96% (06-09-25 @ 05:00)  Wt(kg): --    I and O's:    06-08 @ 07:01  -  06-09 @ 07:00  --------------------------------------------------------  IN: 550 mL / OUT: 1850 mL / NET: -1300 mL          PHYSICAL EXAM:    Constitutional: NAD  HEENT: PERRLA, EOMI,  MMM  Neck: No LAD, No JVD  Respiratory: CTAB  Cardiovascular: S1 and S2  Gastrointestinal: BS+, soft, NT/ND  Extremities: No peripheral edema  Neurological: A/O x 3, no focal deficits  Psychiatric: Normal mood, normal affect  : No Min  Skin: No rashes  Access: Not applicable    LABS:                        9.0    2.44  )-----------( 96       ( 09 Jun 2025 06:50 )             27.1     06-09    145  |  101  |  70[H]  ----------------------------<  119[H]  4.0   |  27  |  2.95[H]    Ca    7.8[L]      09 Jun 2025 06:50  Phos  5.5     06-09  Mg     2.40     06-09    TPro  6.6  /  Alb  3.8  /  TBili  0.7  /  DBili  x   /  AST  16  /  ALT  11  /  AlkPhos  80  06-09      Urine Studies:  Urinalysis Basic - ( 09 Jun 2025 06:50 )    Color: x / Appearance: x / SG: x / pH: x  Gluc: 119 mg/dL / Ketone: x  / Bili: x / Urobili: x   Blood: x / Protein: x / Nitrite: x   Leuk Esterase: x / RBC: x / WBC x   Sq Epi: x / Non Sq Epi: x / Bacteria: x          Mayra Haywood NP  [facility]   [phone]

## 2025-06-09 NOTE — PROGRESS NOTE ADULT - PROBLEM SELECTOR PLAN 1
Appears to have normal JVP.   BUN/Cr elevated.   Please check limited TTE to check IVC dialation to help guide further diuretics.   Increase hydralazine to 50 mg Q8H (hold SBP<90)  Continue ISDN 10mg Q8H (hold SBP<90)  Continue Bumex 2mg PO BID (hold SBP<90)  R/LHC this admission pending SCr    TTE revealed severe AS; Plan for TAVR evaluation this admission (will be limited by Cr)- If patient not a TAVR candidate will hold off on [facility].  Strict I/O  Daily Standing Weights  Monitor Lytes Replete K to keep 4.0-5.0 and Mg>2.0  THe above d/w primary med team.

## 2025-06-09 NOTE — DIETITIAN INITIAL EVALUATION ADULT - ADD RECOMMEND
1. Defer fluid management to medical team  2. Monitor weights, labs, BM's, skin integrity, PO intake   3. Please monitor % PO intake on flowsheets   4. Honor food preferences as able within therapeutic diet order  5. Please obtain height measurements for current admission

## 2025-06-09 NOTE — DIETITIAN INITIAL EVALUATION ADULT - PERTINENT MEDS FT
MEDICATIONS  (STANDING):  allopurinol 200 milliGRAM(s) Oral daily  apixaban      apixaban 2.5 milliGRAM(s) Oral two times a day  bumetanide 2 milliGRAM(s) Oral every 12 hours  chlorhexidine 2% Cloths 1 Application(s) Topical <User Schedule>  clopidogrel Tablet 75 milliGRAM(s) Oral daily  dextrose 5%. 1000 milliLiter(s) (50 mL/Hr) IV Continuous <Continuous>  dextrose 5%. 1000 milliLiter(s) (100 mL/Hr) IV Continuous <Continuous>  dextrose 50% Injectable 25 Gram(s) IV Push once  dextrose 50% Injectable 12.5 Gram(s) IV Push once  dextrose 50% Injectable 25 Gram(s) IV Push once  gabapentin 300 milliGRAM(s) Oral two times a day  glucagon  Injectable 1 milliGRAM(s) IntraMuscular once  hydrALAZINE 37.5 milliGRAM(s) Oral three times a day  insulin glargine Injectable (LANTUS) 5 Unit(s) SubCutaneous at bedtime  insulin lispro (ADMELOG) corrective regimen sliding scale   SubCutaneous three times a day before meals  insulin lispro (ADMELOG) corrective regimen sliding scale   SubCutaneous at bedtime  isosorbide   dinitrate Tablet (ISORDIL) 10 milliGRAM(s) Oral three times a day  levoFLOXacin  Tablet 250 milliGRAM(s) Oral every 24 hours  pantoprazole    Tablet 40 milliGRAM(s) Oral before breakfast  polyethylene glycol 3350 17 Gram(s) Oral daily  rosuvastatin 20 milliGRAM(s) Oral at bedtime  senna 2 Tablet(s) Oral at bedtime    MEDICATIONS  (PRN):  acetaminophen     Tablet .. 650 milliGRAM(s) Oral every 6 hours PRN Moderate Pain (4 - 6)  dextrose Oral Gel 15 Gram(s) Oral once PRN Blood Glucose LESS THAN 70 milliGRAM(s)/deciliter  melatonin 3 milliGRAM(s) Oral at bedtime PRN Insomnia

## 2025-06-09 NOTE — PROGRESS NOTE ADULT - SUBJECTIVE AND OBJECTIVE BOX
Patient seen and examined at bedside. HDS.  Pt currently denies CP, SOB, palpitations, diaphoresis, dizziness, Syncope, LE swelling, acute bleed or any other complaints at this time    - No events on telemetry overnight, remains in sinus rhythm      PERTINENT REVIEW OF SYSTEMS:  Constitutional:     [ ] negative [ ] fevers [ ] chills [ ] weight loss [ ] weight gain  CV:                         [ ] negative  [ ] chest pain [ ] orthopnea [ ] palpitations [ ] murmur  Resp:                     [ ] negative [ ] cough [ ] shortness of breath [ ] dyspnea [ ] wheezing [ ] sputum [ ]hemoptysis  GI:                          [ ] negative [ ] nausea [ ] vomiting [ ] diarrhea [ ] constipation [ ] abd pain [ ] dysphagia   Skin:                       [ ] negative [ ] rash [ ] itch  Neurological:        [ ] negative [ ] headache [ ] dizziness [ ] syncope [ ] weakness [ ] numbness  Psychiatric:           [ ] negative [ ] anxiety [ ] depression  Endocrine:            [ ] negative [ ] diabetes [ ] thyroid problem  Heme/Lymph:      [ ] negative [ ] anemia [ ] bleeding problem  Allergic/Immune: [ ] negative [ ] itchy eyes [ ] nasal discharge [ ] hives [ ] angioedema    [x] All other systems negative  [ ] Unable to assess ROS due to    Home Medications  Home Medications:  allopurinol 100 mg oral tablet: 2 tab(s) orally once a day (03 Jun 2025 01:31)  amLODIPine 5 mg oral tablet: 1 tab(s) orally once a day (03 Jun 2025 01:31)  aspirin 81 mg oral capsule: 1 cap(s) orally once a day (03 Jun 2025 01:31)  gabapentin 300 mg oral capsule: 1 cap(s) orally 3 times a day (04 Jun 2025 15:46)  levoFLOXacin 500 mg oral tablet: 1 tab(s) orally once a day (04 Jun 2025 15:47)  omeprazole 20 mg oral delayed release tablet: 1 tab(s) orally once a day (03 Jun 2025 01:31)  Plavix 75 mg oral tablet: 1 tab(s) orally once a day (03 Jun 2025 01:31)  rosuvastatin 10 mg oral tablet: 1 tab(s) orally once a day (03 Jun 2025 01:31)      Current Meds:  acetaminophen     Tablet .. 650 milliGRAM(s) Oral every 6 hours PRN  allopurinol 200 milliGRAM(s) Oral daily  apixaban      apixaban 2.5 milliGRAM(s) Oral two times a day  bumetanide 2 milliGRAM(s) Oral every 12 hours  chlorhexidine 2% Cloths 1 Application(s) Topical <User Schedule>  clopidogrel Tablet 75 milliGRAM(s) Oral daily  dextrose 5%. 1000 milliLiter(s) IV Continuous <Continuous>  dextrose 5%. 1000 milliLiter(s) IV Continuous <Continuous>  dextrose 50% Injectable 25 Gram(s) IV Push once  dextrose 50% Injectable 12.5 Gram(s) IV Push once  dextrose 50% Injectable 25 Gram(s) IV Push once  dextrose Oral Gel 15 Gram(s) Oral once PRN  gabapentin 300 milliGRAM(s) Oral two times a day  glucagon  Injectable 1 milliGRAM(s) IntraMuscular once  hydrALAZINE 37.5 milliGRAM(s) Oral three times a day  insulin glargine Injectable (LANTUS) 5 Unit(s) SubCutaneous at bedtime  insulin lispro (ADMELOG) corrective regimen sliding scale   SubCutaneous three times a day before meals  insulin lispro (ADMELOG) corrective regimen sliding scale   SubCutaneous at bedtime  isosorbide   dinitrate Tablet (ISORDIL) 10 milliGRAM(s) Oral three times a day  levoFLOXacin  Tablet 250 milliGRAM(s) Oral every 24 hours  melatonin 3 milliGRAM(s) Oral at bedtime PRN  pantoprazole    Tablet 40 milliGRAM(s) Oral before breakfast  polyethylene glycol 3350 17 Gram(s) Oral daily  rosuvastatin 20 milliGRAM(s) Oral at bedtime  senna 2 Tablet(s) Oral at bedtime      PAST MEDICAL & SURGICAL HISTORY:  HTN - Hypertension      Hypercholesterolemia      PC (prostate cancer)  s/p surgical resection 3 years ago      Gout      Aneurysm, ascending aorta      H/O prostatectomy      H/O carotid endarterectomy      H/O renal artery stenosis  s/p stent in Left RA      Status post cataract extraction, unspecified laterality          Vitals:  T(F): 97.6 (06-09), Max: 98.2 (06-09)  HR: 80 (06-09) (75 - 104)  BP: 135/78 (06-09) (120/71 - 135/78)  RR: 16 (06-09)  SpO2: 96% (06-09)  I&O's Summary    08 Jun 2025 07:01  -  09 Jun 2025 07:00  --------------------------------------------------------  IN: 550 mL / OUT: 1850 mL / NET: -1300 mL    Physical Exam:  Appearance: No acute distress; well appearing  Neck: Supple, no JVD B/L, no Carotid Bruit B/L  Cardiovascular: RRR, S1, S2, no murmurs, rubs, or gallops, no edema  Respiratory: Clear to auscultation bilaterally, no RRW B/L  Gastrointestinal: soft, non-tender, non-distended with normal bowel sounds  Neurologic: No focal or neuro deficits noted  Psychiatry: AAOx3, mood & affect appropriate  Extremities:   No LE swelling bilaterally, palpable pulses throughout                            9.0    2.44  )-----------( 96       ( 09 Jun 2025 06:50 )             27.1     06-09    145  |  101  |  70[H]  ----------------------------<  119[H]  4.0   |  27  |  2.95[H]    Ca    7.8[L]      09 Jun 2025 06:50  Phos  5.5     06-09  Mg     2.40     06-09    TPro  6.6  /  Alb  3.8  /  TBili  0.7  /  DBili  x   /  AST  16  /  ALT  11  /  AlkPhos  80  06-09    Cardiac Imaging  ---------------------  < from: TTE W or WO Ultrasound Enhancing Agent (06.04.25 @ 06:31) >    CONCLUSIONS:      1. The left ventricular cavity is normal in size. Left ventricular wall thickness is normal. Left ventricular systolic function is mildly to moderately decreased with an ejection fraction visually estimated at 40 to 45%. There are regional wall motion abnormalities present. Hypokinesis of the inferior and inferolateral walls.   2. Normal right ventricular cavity size and probably normal right ventricular systolic function. Tricuspid annular plane systolic excursion (TAPSE) is 2.0 cm (normal >=1.7 cm).   3. Structurally normal mitral valve with normal leaflet excursion. There is calcification of the mitral valve annulus. There is mild leaflet calcification. There is mild to moderatemitral regurgitation.   4. The aortic valve anatomy cannot be determined. There is calcification of the aortic valve leaflets. The peak transaortic velocity is 3.51 m/s, peak transaortic gradient is 49.3 mmHg and mean transaortic gradient is 26.0 mmHg with an LVOT/aortic valve VTI ratio of 0.25. The gross appearance of the aortic valve is consistent with significant aortic stenosis. However, unable to accurately estimate the aortic valve area. There is mild aortic regurgitation.   5. The left atrium is moderately dilated with an indexed volume of 45.18 ml/m².   6. The tricuspid valve is structurally normal with normal leaflet excursion. There is mild tricuspid regurgitation. Estimated pulmonary artery systolic pressure is 42 mmHg, consistent with mild pulmonary hypertension.   7. Left pleural effusion noted.   8. No prior echocardiogram is available for comparison.    ____________________________________________________________________  Recommendations:  Consider JEAN for further evaluation of the aortic valve, if clinically indicated.     ==========================================================    < from: TTE Limited W or WO Ultrasound Enhancing Agent (12.23.24 @ 09:44) >     CONCLUSIONS:      1. No echocardiographic evidence of vegetations.   2. Moderate mitral regurgitation.   3. Mild aortic regurgitation.   4. Trace pulmonic regurgitation.   5. Trace tricuspid regurgitation.   6. Trileaflet aortic valve with reduced systolic excursion. There is calcification of the aortic valve leaflets.   7. Compared to the transthoracic echocardiogram performed on 6/11/2024, there have been no significant interval changes.    =================================================================    Telemetry:  Sinus Rhythm

## 2025-06-09 NOTE — DIETITIAN INITIAL EVALUATION ADULT - REASON INDICATOR FOR ASSESSMENT
Nutrition Risk Screen for Length Of Stay     Per chart, patient is an 84y Male with PMH CKD3, Afib, anemia, HTN, CHF, chronic UTI, gout, obesity, renal artery stenosis s/p L renal stent, remote history of prostate cancer, and MDS who presented to [facility] for 3 days of orthopnea, increasing shortness of breath, and weight gain concerning for heart failure exacerbation. Course complicated by JUAN RAMON, anemia/thrombocytopenia, and steroid induced hyperglycemia.

## 2025-06-09 NOTE — DIETITIAN INITIAL EVALUATION ADULT - OTHER INFO
Patient is currently ordered for a PO diet. Patient reports a good appetite and PO intake at meals during course of admission. Documented on RN flowsheet as consuming % of meals. Patient denies any chewing or swallowing difficulty with current diet order. No report of GI distress (nausea, vomiting, diarrhea, constipation). Last BM 6/8/25 per RN flowsheet documentation. Noted to be on a bowel regimen.     Patient ordered for daily weights since 6/3, per RN flowsheet: 83.5kg (6/8), 95.3kg (6/6), 84kg (6/5), 86.4kg (6/4), 87.1kg (6/3)  Question accuracy of weight obtained 6/6. Excluding this, objective weight measurements suggest 3.6kg (4%) weight loss x5 days period of time, likely fluid related as patient was documented with moderate edema (6/3) in multiple areas, now with mild edema (6/9). Additionally, patient with [facility] CHF    Writer provided verbal education regarding current diet order and nutrition recommendations for after discharge. Patient verbalized understanding to the discussion.

## 2025-06-09 NOTE — PROGRESS NOTE ADULT - ASSESSMENT
85 y/o male, PMHx of CAD ([facility] 3/18/21 mild LAD 60%, OM1 60%, Prox  ), HFrEF (JEAN 12/26/24 LVEF 35%, trace TR, MR), AFIB (on Eliquis), Renal Artery Stenosis, s/p left renal stent, HTN, Anemia, Prostate Cancer, MDS currently on chemotherapy via PICC line, last dose was in mid April. admitted for decompensated on chronic HFrEF and admitted to CCU for further management.  Heart Failure team consulted, Overall stage C HF, NYHA class IV-severely hypervolemic and hypoxic requiring BIPAP.  Subsequent repeat TTE done 6/4/25 showing significant aortic stenosis, possible severe. Interventional cardiology consulted for TAVR evaluation.  Stepped down from CCU to regional floor for further management    # Aortic Stenosis  # Acute systolic heart failure     - Euvolemic on exam, SpO2 96-99%, O2, 4L NC  - TTE 6/4:  EF 40-45%, RWMA present.  Hypokinesis of the inferior and inferolateral walls.  Moderate MR. pVel 3.51, pGrad 49.3, mGrad 26.0, LVOT/aortic valve VTI ratio of 0.25.  The gross appearance of the aortic valve is consistent with significant aortic stenosis. However, unable to accurately estimate the aortic valve area.  Mild to moderate AR.   - Interventional cards consulted.  Discussed with patient, details and indication for further TAVR evaluation with Right/Left Heart Cath, JEAN and CTA TAVR, to which he is amendable   - Nitro gtt d/c'ed 6/5  - HF following, recs appreciated.  Bumex gtt d/ricardo 6/6 now transitioned to PO Bumex 2mg BID.  Continue Hydralazine 37.5mg TID and Isordil 10mg TID (hold SBP < 100)  - monitor electrolytes, replete to keep K > 4, Mg > 2  - Strict I/Os. Daily weights, fluid restriction  - case discussed with interventionalist, Dr Mendoza, R/[facility] postponed on 6/9/25 as patient is not medically optimized      # CAD  # USHA  - s/p Dx [facility]  2021:  LM normal, LAD 60%, OM1 60%, %   - s/p L Renal stent on DAPT  - continue Rosuvastatin 20mg daily at bedtime    # JUAN RAMON on CKD  - CKD stage 3, baseline Cr ~ 2  - Nephro following, recs appreciated  - Cr up-trending 2.95 today.  Trend BUN/Cr daily. Avoid nephrotoxic agents  - h/o Renal artery stent, on DAPT  - Further plan per Nephro    # Paroxysmal Atrial Fibrillation   - NSR, HR stable 80s  - EP consulted, recs appreciated  - continue Eliquis 2.5mg BID (renally dosed)  - BB held iso heart failure exacerbation    # Anemia  # Myelodysplastic Syndrome (MDS)  - History of MDS, last chemo dose 5/20/25   - Follows with outpatient HemeOnc  - H/H stable 9s, Plts improved 96  - On Levofloxacin for neutropenia  - Further plan per primary team     85 y/o male, PMHx of CAD ([facility] 3/18/21 mild LAD 60%, OM1 60%, Prox  ), HFrEF (JEAN 12/26/24 LVEF 35%, trace TR, MR), AFIB (on Eliquis), Renal Artery Stenosis, s/p left renal stent, HTN, Anemia, Prostate Cancer, MDS currently on chemotherapy via PICC line, last dose was in mid April. admitted for decompensated on chronic HFrEF and admitted to CCU for further management.  Heart Failure team consulted, Overall stage C HF, NYHA class IV-severely hypervolemic and hypoxic requiring BIPAP.  Subsequent repeat TTE done 6/4/25 showing significant aortic stenosis, possible severe. Interventional cardiology consulted for TAVR evaluation.  Stepped down from CCU to regional floor for further management    # Aortic Stenosis  # Acute systolic heart failure     - Euvolemic on exam, SpO2 96-99%, O2, 4L NC  - TTE 6/4:  EF 40-45%, RWMA present.  Hypokinesis of the inferior and inferolateral walls.  Moderate MR. pVel 3.51, pGrad 49.3, mGrad 26.0, LVOT/aortic valve VTI ratio of 0.25.  The gross appearance of the aortic valve is consistent with significant aortic stenosis. However, unable to accurately estimate the aortic valve area.  Mild to moderate AR.   - Interventional cards consulted.  Discussed with patient, details and indication for further TAVR evaluation with Right/Left Heart Cath, JEAN and CTA TAVR, to which he is amendable   - Nitro gtt d/c'ed 6/5  - HF following, recs appreciated.  Bumex gtt d/ricardo 6/6 now transitioned to PO Bumex 2mg BID.  Continue Hydralazine 37.5mg TID and Isordil 10mg TID (hold SBP < 100)  - monitor electrolytes, replete to keep K > 4, Mg > 2  - Strict I/Os. Daily weights, fluid restriction  - case discussed with interventionalist, Dr Mendoza, R/[facility] postponed on 6/9/25 as patient is not medically optimized      # CAD  # USHA  - s/p Dx [facility]  2021:  LM normal, LAD 60%, OM1 60%, %   - s/p L Renal stent on DAPT  - continue Rosuvastatin 20mg daily at bedtime    # JUAN RAMON on CKD  - CKD stage 3, baseline Cr ~ 2  - Nephro following, recs appreciated  - Cr up-trending 2.95 today.  Trend BUN/Cr daily. Avoid nephrotoxic agents  - h/o Renal artery stent, on DAPT  - Further plan per Nephro    # Paroxysmal Atrial Fibrillation   - NSR, HR stable 80s  - EP consulted, recs appreciated  - continue Eliquis 2.5mg BID (renally dosed)  - BB held iso heart failure exacerbation    # Anemia  # Myelodysplastic Syndrome (MDS)  - History of MDS, last chemo dose 5/20/25   - Follows with outpatient HemeOnc  - H/H stable 9s, Plts improved 96  - On Levofloxacin for neutropenia  - Further plan per primary team

## 2025-06-09 NOTE — PROGRESS NOTE ADULT - ASSESSMENT
83 Y/O M PMH MDS, HTN, CKD 3, A Fib, Anemia, HTN, Prostate CA, gout, obesity, presents with 3 days of orthopnea, increasing shortness of breath and weight gain  CHF   CKD stage 4   Anemia, thrombocytopenia, leukopenia - Pancytopenia   Severe AS        Plan:    1 Renal- azotemia slightly higher though overall same/stable. Continue Bumex 2mg PO BID per HF rec's for now;    Trend serum creatinine and strict ins and outs   Encourage po intake   No IVF's  2 CVS- BP stable, now off nitro gtt and not on BBlockers at present; on Hydralazine 37.5mg po tid and Isordil 10 mg po tid   Mgmt per HF   Right and left heart cath TBD- no renal objection (creatinine noted)   TTE revealed severe AS; Plan for TAVR evaluation this admission  3 Pulm- now off bipap and on nasal cannula   May need CT of the chest to assess the effusions and see if he would benefit from possible thoracentesis   4 Heme - Serial cbc's; Hgb improved     [facility]   [phone]

## 2025-06-09 NOTE — DIETITIAN INITIAL EVALUATION ADULT - ORAL INTAKE PTA/DIET HISTORY
Patient reports no known food allergies or food intolerances. Nutrition supplementation at home includes a multivitamin and Ensure on occasion. Patient has a 24/7 HHA. Endorses he maintains a good appetite at baseline with no recent changes.

## 2025-06-09 NOTE — PROGRESS NOTE ADULT - ASSESSMENT
83 y/o male with PMHX of CAD ([facility] 3/18/21 mild LAD 60%, OM1 60%, Prox  ), HFrEF (JEAN 12/26/24 LVEF 35%, trace TR, MR), afib, anemia, renal artery stenosis s/p L renal stent, HTN, prstate CA/ MDS currently on chemotherapy via PICC line, last dose was in mid April.  Pt comes in with complaints of SOB and weight gain. He was found to be hypoxic in ED with O2 sat 80s, was placed on BIPAP. HF consult called on 6/3/25 to help manage care in this patient who is suspected to be in ADHF. Labs show proBNP 6482, lactate 2.5, BUN/Cr 41/2.55, H/H 8.3/25, trop 171/166. CXR shows b/l pleural effusions. Pt was placed on BIPAP,  and was diuresed. He admits to eating very salty meals- frozen dinners frequently. He is compliant with taking all HF medications, given to him by his aide. Overall stage C HF, NYHA class IV-admitted with severe hypervolemia, but has diuresed will and is currently close to euvolemic.

## 2025-06-09 NOTE — PROGRESS NOTE ADULT - ASSESSMENT
84-year-old male with a past medical history of myelodysplastic syndrome (MDS), hypertension (HTN), chronic kidney disease stage 3 (CKD 3), atrial fibrillation (A Fib), anemia, prostate cancer, gout, and obesity, presented with acute hypoxic respiratory failure requiring BiPAP secondary to an acute exacerbation of heart failure with reduced ejection fraction (HFrEF). He was transferred to the coronary care unit (CCU) and diuresed. He is now stabilized and has been transferred back to the medical floor.    Active Problems:    Myelodysplastic Syndrome (MDS)  Acute on chronic heart failure with reduced ejection fraction (HFrEF)  Acute hypoxic respiratory failure  Severe Aortic Stenosis  Chronic kidney disease stage 3 (CKD 3)  Atrial fibrillation with rapid ventricular response (A Fib with RVR)/pAFib  Hypercoagulable state  Anemia  Hypertension (HTN)  Prostate cancer  Gout  Steroid induced hyperglycemia  Obesity  Immunosuppressed status    MDS: History of MDS;   Last chemotherapy on May 20th. 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 Per outpatient chart, anemia is also multifactorial (anemia of chronic disease/anemia of renal failure). Patient takes levofloxacin for neutropenia. Continue outpatient follow-up.    Acute on Chronic HFrEF/Acute Hypoxic Respiratory Failure:   Hydralazine increased to 50mg every 8 hours per HF recs. C/w Isosorbide dinitrate (ISDN) 10mg every 8 hours. C/w bumetanide (Bumex) 2mg PO twice daily. Left and Right heart catheterization this admission pending Cr. Transthoracic echocardiogram (TTE) revealed severe aortic stenosis (AS), repeat limited TTE 6/9 w/ normal RA pressure  Starting Toprol 12.5 mg po qd 6/10  Pending improvement with kidney function -plan for transcatheter aortic valve replacement (TAVR) evaluation this admission (will be limited by creatinine). Continue clopidogrel (Plavix), statin. Strict intake and output monitoring. Daily standing weights. Monitor electrolytes; replete potassium to > 4.0 mEq/L and magnesium to > 2.0 mg/dL  On 4L NC, being weaned to 3L today, continue to wean off O2 as tolerated    Steroid induced hyperglycemia  FS on 300 range  Increasing lantus to 10U qhs  C/w SAMIR for now, may need to change to MISS and add carb consistent diet    Acute on chronic CKD 3:   Nephrology consulted. Recommendations appreciated  Continue diuresis   No renal objection for LHC/RHC    Hypercoagulable State/A Fib with RVR, pAFib  On apixaban (Eliquis)  Now on NSR    Anemia:   Monitor as above (under MDS)    Hypertension:  Continue medications as above for heart failure    PT eval --> NERI but pt wants to go back home with continued 24/7 care, d/w SW and apprec assistance.     84-year-old male with a past medical history of myelodysplastic syndrome (MDS), hypertension (HTN), chronic kidney disease stage 3 (CKD 3), atrial fibrillation (A Fib), anemia, prostate cancer, gout, and obesity, presented with acute hypoxic respiratory failure requiring BiPAP secondary to an acute exacerbation of heart failure with reduced ejection fraction (HFrEF). He was transferred to the coronary care unit (CCU) and diuresed. He is now stabilized and has been transferred back to the medical floor.    Active Problems:    Myelodysplastic Syndrome (MDS)  Acute on chronic heart failure with reduced ejection fraction (HFrEF)  Acute hypoxic respiratory failure  Severe Aortic Stenosis  Chronic kidney disease stage 3 (CKD 3)  Atrial fibrillation with rapid ventricular response (A Fib with RVR)/pAFib  Hypercoagulable state  Anemia  Hypertension (HTN)  Prostate cancer  Gout  Steroid induced hyperglycemia  Obesity  Immunosuppressed status    MDS: History of MDS;   Last chemotherapy on May 20th. 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 Per outpatient chart, anemia is also multifactorial (anemia of chronic disease/anemia of renal failure). Patient takes levofloxacin for neutropenia. Continue outpatient follow-up.    Acute on Chronic HFrEF/Acute Hypoxic Respiratory Failure:   Hydralazine increased to 50mg every 8 hours per HF recs. C/w Isosorbide dinitrate (ISDN) 10mg every 8 hours. C/w bumetanide (Bumex) 2mg PO twice daily. Left and Right heart catheterization this admission pending Cr. Transthoracic echocardiogram (TTE) revealed severe aortic stenosis (AS), repeat limited TTE 6/9 w/ normal RA pressure  Starting Toprol 12.5 mg po qd 6/10  Pending improvement with kidney function -plan for transcatheter aortic valve replacement (TAVR) evaluation this admission (will be limited by creatinine). Continue clopidogrel (Plavix), statin. Strict intake and output monitoring. Daily standing weights. Monitor electrolytes; replete potassium to > 4.0 mEq/L and magnesium to > 2.0 mg/dL  On 4L NC, being weaned to 3L today, continue to wean off O2 as tolerated  TOV prior to discharge    Steroid induced hyperglycemia  FS on 300 range  Increasing lantus to 10U qhs  C/w SAMIR for now, may need to change to MISS and add carb consistent diet    Acute on chronic CKD 3:   Nephrology consulted. Recommendations appreciated  Continue diuresis   No renal objection for LHC/RHC    Hypercoagulable State/A Fib with RVR, pAFib  On apixaban (Eliquis)  Now on NSR    Anemia:   Monitor as above (under MDS)    Hypertension:  Continue medications as above for heart failure    PT eval --> NERI but pt wants to go back home with continued 24/7 care, d/w SW and apprec assistance.

## 2025-06-09 NOTE — PROGRESS NOTE ADULT - SUBJECTIVE AND OBJECTIVE BOX
Patient is a 84y old  Male who presents with a chief complaint of Fluid overload (09 Jun 2025 12:50)      INTERVAL HPI/OVERNIGHT EVENTS:  Seen by me this afternoon, sitting in chair, aide at bedside, feels well, on 4-5L NC. Tolerating PO. Denies pain, no SOB or chest pain.    Review of Systems: 12 point review of systems otherwise negative    MEDICATIONS  (STANDING):  allopurinol 200 milliGRAM(s) Oral daily  apixaban      apixaban 2.5 milliGRAM(s) Oral two times a day  bumetanide 2 milliGRAM(s) Oral every 12 hours  chlorhexidine 2% Cloths 1 Application(s) Topical <User Schedule>  clopidogrel Tablet 75 milliGRAM(s) Oral daily  dextrose 5%. 1000 milliLiter(s) (50 mL/Hr) IV Continuous <Continuous>  dextrose 5%. 1000 milliLiter(s) (100 mL/Hr) IV Continuous <Continuous>  dextrose 50% Injectable 25 Gram(s) IV Push once  dextrose 50% Injectable 12.5 Gram(s) IV Push once  dextrose 50% Injectable 25 Gram(s) IV Push once  gabapentin 300 milliGRAM(s) Oral two times a day  glucagon  Injectable 1 milliGRAM(s) IntraMuscular once  hydrALAZINE 50 milliGRAM(s) Oral three times a day  insulin glargine Injectable (LANTUS) 10 Unit(s) SubCutaneous at bedtime  insulin lispro (ADMELOG) corrective regimen sliding scale   SubCutaneous three times a day before meals  insulin lispro (ADMELOG) corrective regimen sliding scale   SubCutaneous at bedtime  isosorbide   dinitrate Tablet (ISORDIL) 10 milliGRAM(s) Oral three times a day  levoFLOXacin  Tablet 250 milliGRAM(s) Oral every 24 hours  pantoprazole    Tablet 40 milliGRAM(s) Oral before breakfast  polyethylene glycol 3350 17 Gram(s) Oral daily  rosuvastatin 20 milliGRAM(s) Oral at bedtime  senna 2 Tablet(s) Oral at bedtime    MEDICATIONS  (PRN):  acetaminophen     Tablet .. 650 milliGRAM(s) Oral every 6 hours PRN Moderate Pain (4 - 6)  dextrose Oral Gel 15 Gram(s) Oral once PRN Blood Glucose LESS THAN 70 milliGRAM(s)/deciliter  melatonin 3 milliGRAM(s) Oral at bedtime PRN Insomnia      Allergies    No Known Allergies    Intolerances          Vital Signs Last 24 Hrs  T(C): 36.5 (09 Jun 2025 14:35), Max: 36.8 (09 Jun 2025 00:40)  T(F): 97.7 (09 Jun 2025 14:35), Max: 98.2 (09 Jun 2025 00:40)  HR: 81 (09 Jun 2025 14:35) (75 - 104)  BP: 120/68 (09 Jun 2025 14:35) (120/68 - 139/73)  BP(mean): --  RR: 18 (09 Jun 2025 14:35) (16 - 18)  SpO2: 97% (09 Jun 2025 14:35) (93% - 99%)    Parameters below as of 09 Jun 2025 14:35    O2 Flow (L/min): 4    CAPILLARY BLOOD GLUCOSE      POCT Blood Glucose.: 347 mg/dL (09 Jun 2025 12:44)  POCT Blood Glucose.: 185 mg/dL (09 Jun 2025 08:41)  POCT Blood Glucose.: 229 mg/dL (08 Jun 2025 22:11)  POCT Blood Glucose.: 309 mg/dL (08 Jun 2025 17:51)      06-08 @ 07:01  -  06-09 @ 07:00  --------------------------------------------------------  IN: 550 mL / OUT: 1850 mL / NET: -1300 mL    06-09 @ 07:01  -  06-09 @ 16:52  --------------------------------------------------------  IN: 0 mL / OUT: 1500 mL / NET: -1500 mL        Physical Exam:    Daily     Daily   General:  Well appearing, NAD, not cachetic, on 3L NC  HEENT:  Nonicteric, PERRLA  CV:  RRR, no murmur, no JVD  Lungs:  Minimal crackles at bases  Abdomen:  Soft, non-tender, no distended, positive BS  Extremities:  2+ pulses, no c/c, no edema  Skin:  Warm and dry, no rashes  :  +DEE in place  Neuro:  AAOx3, non-focal, CN II-XII grossly intact  No Restraints    LABS:                        9.0    2.44  )-----------( 96       ( 09 Jun 2025 06:50 )             27.1     06-09    145  |  101  |  70[H]  ----------------------------<  119[H]  4.0   |  27  |  2.95[H]    Ca    7.8[L]      09 Jun 2025 06:50  Phos  5.5     06-09  Mg     2.40     06-09    TPro  6.6  /  Alb  3.8  /  TBili  0.7  /  DBili  x   /  AST  16  /  ALT  11  /  AlkPhos  80  06-09      Urinalysis Basic - ( 09 Jun 2025 06:50 )    Color: x / Appearance: x / SG: x / pH: x  Gluc: 119 mg/dL / Ketone: x  / Bili: x / Urobili: x   Blood: x / Protein: x / Nitrite: x   Leuk Esterase: x / RBC: x / WBC x   Sq Epi: x / Non Sq Epi: x / Bacteria: x          RADIOLOGY & ADDITIONAL TESTS:  Reviewed by me

## 2025-06-09 NOTE — PROGRESS NOTE ADULT - SUBJECTIVE AND OBJECTIVE BOX
Medications:  acetaminophen     Tablet .. 650 milliGRAM(s) Oral every 6 hours PRN  allopurinol 200 milliGRAM(s) Oral daily  apixaban      apixaban 2.5 milliGRAM(s) Oral two times a day  bumetanide 2 milliGRAM(s) Oral every 12 hours  chlorhexidine 2% Cloths 1 Application(s) Topical <User Schedule>  clopidogrel Tablet 75 milliGRAM(s) Oral daily  dextrose 5%. 1000 milliLiter(s) IV Continuous <Continuous>  dextrose 5%. 1000 milliLiter(s) IV Continuous <Continuous>  dextrose 50% Injectable 25 Gram(s) IV Push once  dextrose 50% Injectable 12.5 Gram(s) IV Push once  dextrose 50% Injectable 25 Gram(s) IV Push once  dextrose Oral Gel 15 Gram(s) Oral once PRN  gabapentin 300 milliGRAM(s) Oral two times a day  glucagon  Injectable 1 milliGRAM(s) IntraMuscular once  hydrALAZINE 37.5 milliGRAM(s) Oral three times a day  insulin glargine Injectable (LANTUS) 5 Unit(s) SubCutaneous at bedtime  insulin lispro (ADMELOG) corrective regimen sliding scale   SubCutaneous three times a day before meals  insulin lispro (ADMELOG) corrective regimen sliding scale   SubCutaneous at bedtime  isosorbide   dinitrate Tablet (ISORDIL) 10 milliGRAM(s) Oral three times a day  levoFLOXacin  Tablet 250 milliGRAM(s) Oral every 24 hours  melatonin 3 milliGRAM(s) Oral at bedtime PRN  pantoprazole    Tablet 40 milliGRAM(s) Oral before breakfast  polyethylene glycol 3350 17 Gram(s) Oral daily  rosuvastatin 20 milliGRAM(s) Oral at bedtime  senna 2 Tablet(s) Oral at bedtime      Vitals:  Vital Signs Last 24 Hrs  T(C): 36.4 (09 Jun 2025 05:00), Max: 36.8 (09 Jun 2025 00:40)  T(F): 97.6 (09 Jun 2025 05:00), Max: 98.2 (09 Jun 2025 00:40)  HR: 80 (09 Jun 2025 05:00) (75 - 104)  BP: 135/78 (09 Jun 2025 05:00) (120/71 - 135/78)  BP(mean): --  RR: 16 (09 Jun 2025 05:00) (16 - 18)  SpO2: 96% (09 Jun 2025 05:00) (93% - 99%)    Parameters below as of 09 Jun 2025 05:00  Patient On (Oxygen Delivery Method): nasal cannula  O2 Flow (L/min): 4      Daily     Daily     I&O's Detail    08 Jun 2025 07:01  -  09 Jun 2025 07:00  --------------------------------------------------------  IN:    Oral Fluid: 550 mL  Total IN: 550 mL    OUT:    Indwelling Catheter - Urethral (mL): 1850 mL  Total OUT: 1850 mL    Total NET: -1300 mL          Physical Exam:     General: No distress. Comfortable.  HEENT: EOM intact.  Neck: Neck supple. JVP not elevated. No masses  Chest: Clear to auscultation bilaterally  CV: Normal S1 and S2. No murmurs, rub, or gallops. Radial pulses normal.  Abdomen: Soft, non-distended, non-tender  Skin: No rashes or skin breakdown  Neurology: Alert and oriented times three. Sensation intact  Psych: Affect normal    Labs:                        9.0    2.44  )-----------( 96       ( 09 Jun 2025 06:50 )             27.1     06-09    145  |  101  |  70[H]  ----------------------------<  119[H]  4.0   |  27  |  2.95[H]    Ca    7.8[L]      09 Jun 2025 06:50  Phos  5.5     06-09  Mg     2.40     06-09    TPro  6.6  /  Alb  3.8  /  TBili  0.7  /  DBili  x   /  AST  16  /  ALT  11  /  AlkPhos  80  06-09           Patient seen and examined. He states he feels much better- denies SOB at rest, FOX, CP, palpitations, dizziness. Aide by bedside, and agrees he looks a lot better.   Cr remains elevated.       Medications:  acetaminophen     Tablet .. 650 milliGRAM(s) Oral every 6 hours PRN  allopurinol 200 milliGRAM(s) Oral daily  apixaban      apixaban 2.5 milliGRAM(s) Oral two times a day  bumetanide 2 milliGRAM(s) Oral every 12 hours  chlorhexidine 2% Cloths 1 Application(s) Topical <User Schedule>  clopidogrel Tablet 75 milliGRAM(s) Oral daily  dextrose 5%. 1000 milliLiter(s) IV Continuous <Continuous>  dextrose 5%. 1000 milliLiter(s) IV Continuous <Continuous>  dextrose 50% Injectable 25 Gram(s) IV Push once  dextrose 50% Injectable 12.5 Gram(s) IV Push once  dextrose 50% Injectable 25 Gram(s) IV Push once  dextrose Oral Gel 15 Gram(s) Oral once PRN  gabapentin 300 milliGRAM(s) Oral two times a day  glucagon  Injectable 1 milliGRAM(s) IntraMuscular once  hydrALAZINE 37.5 milliGRAM(s) Oral three times a day  insulin glargine Injectable (LANTUS) 5 Unit(s) SubCutaneous at bedtime  insulin lispro (ADMELOG) corrective regimen sliding scale   SubCutaneous three times a day before meals  insulin lispro (ADMELOG) corrective regimen sliding scale   SubCutaneous at bedtime  isosorbide   dinitrate Tablet (ISORDIL) 10 milliGRAM(s) Oral three times a day  levoFLOXacin  Tablet 250 milliGRAM(s) Oral every 24 hours  melatonin 3 milliGRAM(s) Oral at bedtime PRN  pantoprazole    Tablet 40 milliGRAM(s) Oral before breakfast  polyethylene glycol 3350 17 Gram(s) Oral daily  rosuvastatin 20 milliGRAM(s) Oral at bedtime  senna 2 Tablet(s) Oral at bedtime      Vitals:  Vital Signs Last 24 Hrs  T(C): 36.4 (09 Jun 2025 05:00), Max: 36.8 (09 Jun 2025 00:40)  T(F): 97.6 (09 Jun 2025 05:00), Max: 98.2 (09 Jun 2025 00:40)  HR: 80 (09 Jun 2025 05:00) (75 - 104)  BP: 135/78 (09 Jun 2025 05:00) (120/71 - 135/78)  BP(mean): --  RR: 16 (09 Jun 2025 05:00) (16 - 18)  SpO2: 96% (09 Jun 2025 05:00) (93% - 99%)    Parameters below as of 09 Jun 2025 05:00  Patient On (Oxygen Delivery Method): nasal cannula  O2 Flow (L/min): 4      Daily     Daily     I&O's Detail    08 Jun 2025 07:01  -  09 Jun 2025 07:00  --------------------------------------------------------  IN:    Oral Fluid: 550 mL  Total IN: 550 mL    OUT:    Indwelling Catheter - Urethral (mL): 1850 mL  Total OUT: 1850 mL    Total NET: -1300 mL          Physical Exam:     General: No distress. Comfortable.  HEENT: EOM intact.  Neck: Neck supple. JVP appears 6cm- not elevated. No masses  Chest: decreased on b/l bases   CV: Normal S1 and S2. No murmurs, rub, or gallops. Radial pulses normal. No LE edema and is warm b/l.   Abdomen: Soft, non-distended, non-tender  Skin: No rashes or skin breakdown  Neurology: Alert and oriented times three. Sensation intact  Psych: Affect normal    Labs:                        9.0    2.44  )-----------( 96       ( 09 Jun 2025 06:50 )             27.1     06-09    145  |  101  |  70[H]  ----------------------------<  119[H]  4.0   |  27  |  2.95[H]    Ca    7.8[L]      09 Jun 2025 06:50  Phos  5.5     06-09  Mg     2.40     06-09    TPro  6.6  /  Alb  3.8  /  TBili  0.7  /  DBili  x   /  AST  16  /  ALT  11  /  AlkPhos  80  06-09

## 2025-06-09 NOTE — DIETITIAN INITIAL EVALUATION ADULT - PERTINENT LABORATORY DATA
06-09    145  |  101  |  70[H]  ----------------------------<  119[H]  4.0   |  27  |  2.95[H]    Ca    7.8[L]      09 Jun 2025 06:50  Phos  5.5     06-09  Mg     2.40     06-09    TPro  6.6  /  Alb  3.8  /  TBili  0.7  /  DBili  x   /  AST  16  /  ALT  11  /  AlkPhos  80  06-09  A1C with Estimated Average Glucose Result: 5.5 % (06-04-25 @ 04:37)  A1C with Estimated Average Glucose Result: 5.3 % (06-12-24 @ 07:18)  POCT Blood Glucose.: 347 mg/dL (06-09-25 @ 12:44)  POCT Blood Glucose.: 185 mg/dL (06-09-25 @ 08:41)  POCT Blood Glucose.: 229 mg/dL (06-08-25 @ 22:11)  POCT Blood Glucose.: 309 mg/dL (06-08-25 @ 17:51)  POCT Blood Glucose.: 371 mg/dL (06-08-25 @ 16:02)  POCT Blood Glucose.: 392 mg/dL (06-08-25 @ 12:45)  POCT Blood Glucose.: 348 mg/dL (06-08-25 @ 12:43)  POCT Blood Glucose.: 188 mg/dL (06-08-25 @ 08:38)

## 2025-06-09 NOTE — DIETITIAN INITIAL EVALUATION ADULT - ORAL NUTRITION SUPPLEMENTS
Consider Glucerna Shake (provides 220kcal, 10gms protein per serving) 1x daily to support nutrition status

## 2025-06-09 NOTE — DIETITIAN INITIAL EVALUATION ADULT - OTHER CALCULATIONS
No height for current admission. Per patient report and Roque SANCHEZ, height 170.2cm (3/12/25) which is used for this assessment  IBW: 148 lb +/- 10%  Per [facility], noted the following weight history: 97.5kg (6/2), 83.5kg (4/18), 83.5kg (3/12), 83.5kg (12/26)  Objective weight measurements suggest weight maintenance until April 2025, suggests 14kg (14%) weight gain x2 months period of time. Accuracy? Patient currently documented with mild edema per RN flowsheet. Patient ordered for daily wts

## 2025-06-09 NOTE — PROGRESS NOTE ADULT - NS ATTEND AMEND GEN_ALL_CORE FT
A/w acute hypoxemic resp failure requiring BiPAP 100%, with O2 desat to 70% off O2.  Now in CCU. Off Bumex and NTG drips.  SCr remains ~3.0.  CXR shows persistent pleural effusions. Difficult to compare with previous b/o different degrees of inspiration.  Would d/c PO Bumex and give Bumex 3 mg iv x1 today. Check renal fnc in evening.  Increase hydralazine to 50 mg po tid.  Consider starting Toprol 12.5 mg po qd.  R/LHC on hold till SCr trends toward baseline. A/w acute hypoxemic resp failure requiring BiPAP 100%, with O2 desat to 70% off O2.  Now in CCU. Off Bumex and NTG drips.  SCr remains ~3.0.  CXR shows persistent pleural effusions. Difficult to compare with previous b/o different degrees of inspiration.  Increase hydralazine to 50 mg po tid.  Consider starting Toprol 12.5 mg po qd.  R/LHC on hold till SCr trends toward baseline.    PA addendum:  As d/w Dr. Carver, please check limited TTE to check IVC dilation to help guide further diuretics.

## 2025-06-09 NOTE — PROGRESS NOTE ADULT - NS ATTEST RISK PROBLEM GEN_ALL_CORE FT
Acute on chronic heart failure with reduced ejection fraction (HFrEF), Acute hypoxic respiratory failure, JUAN RAMON on Chronic kidney disease stage 3, steroid induced hyperglycemia, hypercoagulable state, immunosuppressed status

## 2025-06-10 LAB
ALBUMIN SERPL ELPH-MCNC: 3.8 G/DL — SIGNIFICANT CHANGE UP (ref 3.3–5)
ALP SERPL-CCNC: 79 U/L — SIGNIFICANT CHANGE UP (ref 40–120)
ALT FLD-CCNC: 12 U/L — SIGNIFICANT CHANGE UP (ref 4–41)
ANION GAP SERPL CALC-SCNC: 16 MMOL/L — HIGH (ref 7–14)
AST SERPL-CCNC: 15 U/L — SIGNIFICANT CHANGE UP (ref 4–40)
BASOPHILS # BLD AUTO: 0 K/UL — SIGNIFICANT CHANGE UP (ref 0–0.2)
BASOPHILS NFR BLD AUTO: 0 % — SIGNIFICANT CHANGE UP (ref 0–2)
BILIRUB SERPL-MCNC: 0.7 MG/DL — SIGNIFICANT CHANGE UP (ref 0.2–1.2)
BUN SERPL-MCNC: 74 MG/DL — HIGH (ref 7–23)
CALCIUM SERPL-MCNC: 7.2 MG/DL — LOW (ref 8.4–10.5)
CHLORIDE SERPL-SCNC: 97 MMOL/L — LOW (ref 98–107)
CO2 SERPL-SCNC: 26 MMOL/L — SIGNIFICANT CHANGE UP (ref 22–31)
CREAT SERPL-MCNC: 2.96 MG/DL — HIGH (ref 0.5–1.3)
EGFR: 20 ML/MIN/1.73M2 — LOW
EGFR: 20 ML/MIN/1.73M2 — LOW
EOSINOPHIL # BLD AUTO: 0 K/UL — SIGNIFICANT CHANGE UP (ref 0–0.5)
EOSINOPHIL NFR BLD AUTO: 0 % — SIGNIFICANT CHANGE UP (ref 0–6)
GLUCOSE BLDC GLUCOMTR-MCNC: 120 MG/DL — HIGH (ref 70–99)
GLUCOSE BLDC GLUCOMTR-MCNC: 130 MG/DL — HIGH (ref 70–99)
GLUCOSE BLDC GLUCOMTR-MCNC: 145 MG/DL — HIGH (ref 70–99)
GLUCOSE BLDC GLUCOMTR-MCNC: 209 MG/DL — HIGH (ref 70–99)
GLUCOSE SERPL-MCNC: 155 MG/DL — HIGH (ref 70–99)
HCT VFR BLD CALC: 27.8 % — LOW (ref 39–50)
HGB BLD-MCNC: 9.2 G/DL — LOW (ref 13–17)
IANC: 1.06 K/UL — LOW (ref 1.8–7.4)
IMM GRANULOCYTES NFR BLD AUTO: 1.4 % — HIGH (ref 0–0.9)
LYMPHOCYTES # BLD AUTO: 0.8 K/UL — LOW (ref 1–3.3)
LYMPHOCYTES # BLD AUTO: 38.6 % — SIGNIFICANT CHANGE UP (ref 13–44)
MAGNESIUM SERPL-MCNC: 2.3 MG/DL — SIGNIFICANT CHANGE UP (ref 1.6–2.6)
MCHC RBC-ENTMCNC: 33.1 G/DL — SIGNIFICANT CHANGE UP (ref 32–36)
MCHC RBC-ENTMCNC: 34.5 PG — HIGH (ref 27–34)
MCV RBC AUTO: 104.1 FL — HIGH (ref 80–100)
MONOCYTES # BLD AUTO: 0.18 K/UL — SIGNIFICANT CHANGE UP (ref 0–0.9)
MONOCYTES NFR BLD AUTO: 8.7 % — SIGNIFICANT CHANGE UP (ref 2–14)
NEUTROPHILS # BLD AUTO: 1.06 K/UL — LOW (ref 1.8–7.4)
NEUTROPHILS NFR BLD AUTO: 51.3 % — SIGNIFICANT CHANGE UP (ref 43–77)
NRBC # BLD AUTO: 0.02 K/UL — HIGH (ref 0–0)
NRBC # FLD: 0.02 K/UL — HIGH (ref 0–0)
NRBC BLD AUTO-RTO: 1 /100 WBCS — HIGH (ref 0–0)
PHOSPHATE SERPL-MCNC: 5.1 MG/DL — HIGH (ref 2.5–4.5)
PLATELET # BLD AUTO: 87 K/UL — LOW (ref 150–400)
POTASSIUM SERPL-MCNC: 3.6 MMOL/L — SIGNIFICANT CHANGE UP (ref 3.5–5.3)
POTASSIUM SERPL-SCNC: 3.6 MMOL/L — SIGNIFICANT CHANGE UP (ref 3.5–5.3)
PROT SERPL-MCNC: 6.3 G/DL — SIGNIFICANT CHANGE UP (ref 6–8.3)
RBC # BLD: 2.67 M/UL — LOW (ref 4.2–5.8)
RBC # FLD: 24.2 % — HIGH (ref 10.3–14.5)
SODIUM SERPL-SCNC: 139 MMOL/L — SIGNIFICANT CHANGE UP (ref 135–145)
WBC # BLD: 2.07 K/UL — LOW (ref 3.8–10.5)
WBC # FLD AUTO: 2.07 K/UL — LOW (ref 3.8–10.5)

## 2025-06-10 PROCEDURE — 99233 SBSQ HOSP IP/OBS HIGH 50: CPT

## 2025-06-10 PROCEDURE — 71045 X-RAY EXAM CHEST 1 VIEW: CPT | Mod: 26

## 2025-06-10 PROCEDURE — 99232 SBSQ HOSP IP/OBS MODERATE 35: CPT

## 2025-06-10 RX ADMIN — BUMETANIDE 2 MILLIGRAM(S): 1 TABLET ORAL at 05:54

## 2025-06-10 RX ADMIN — Medication 40 MILLIGRAM(S): at 06:41

## 2025-06-10 RX ADMIN — Medication 20 MILLIEQUIVALENT(S): at 12:59

## 2025-06-10 RX ADMIN — METOPROLOL SUCCINATE 12.5 MILLIGRAM(S): 50 TABLET, EXTENDED RELEASE ORAL at 05:59

## 2025-06-10 RX ADMIN — ISOSORBDIE DINITRATE 10 MILLIGRAM(S): 30 TABLET ORAL at 12:59

## 2025-06-10 RX ADMIN — GABAPENTIN 300 MILLIGRAM(S): 400 CAPSULE ORAL at 05:52

## 2025-06-10 RX ADMIN — INSULIN LISPRO 2: 100 INJECTION, SOLUTION INTRAVENOUS; SUBCUTANEOUS at 17:52

## 2025-06-10 RX ADMIN — Medication 75 MILLIGRAM(S): at 14:09

## 2025-06-10 RX ADMIN — Medication 1 APPLICATION(S): at 05:55

## 2025-06-10 RX ADMIN — APIXABAN 2.5 MILLIGRAM(S): 2.5 TABLET, FILM COATED ORAL at 17:53

## 2025-06-10 RX ADMIN — APIXABAN 2.5 MILLIGRAM(S): 2.5 TABLET, FILM COATED ORAL at 05:52

## 2025-06-10 RX ADMIN — ROSUVASTATIN CALCIUM 20 MILLIGRAM(S): 20 TABLET, FILM COATED ORAL at 22:37

## 2025-06-10 RX ADMIN — CLOPIDOGREL BISULFATE 75 MILLIGRAM(S): 75 TABLET, FILM COATED ORAL at 12:59

## 2025-06-10 RX ADMIN — Medication 50 MILLILITER(S): at 16:36

## 2025-06-10 RX ADMIN — ISOSORBDIE DINITRATE 10 MILLIGRAM(S): 30 TABLET ORAL at 05:53

## 2025-06-10 RX ADMIN — Medication 200 MILLIGRAM(S): at 12:59

## 2025-06-10 RX ADMIN — GABAPENTIN 300 MILLIGRAM(S): 400 CAPSULE ORAL at 17:53

## 2025-06-10 RX ADMIN — Medication 75 MILLIGRAM(S): at 22:39

## 2025-06-10 RX ADMIN — Medication 2 TABLET(S): at 22:37

## 2025-06-10 RX ADMIN — ISOSORBDIE DINITRATE 10 MILLIGRAM(S): 30 TABLET ORAL at 19:44

## 2025-06-10 RX ADMIN — Medication 50 MILLIGRAM(S): at 05:52

## 2025-06-10 RX ADMIN — INSULIN GLARGINE-YFGN 10 UNIT(S): 100 INJECTION, SOLUTION SUBCUTANEOUS at 22:33

## 2025-06-10 NOTE — PROGRESS NOTE ADULT - SUBJECTIVE AND OBJECTIVE BOX
Patient seen and examined    REVIEW OF SYSTEMS:  As per HPI, otherwise 8 full 10 ROS were unremarkable    MEDICATIONS  (STANDING):  allopurinol 200 milliGRAM(s) Oral daily  apixaban      apixaban 2.5 milliGRAM(s) Oral two times a day  bumetanide 2 milliGRAM(s) Oral every 12 hours  chlorhexidine 2% Cloths 1 Application(s) Topical <User Schedule>  clopidogrel Tablet 75 milliGRAM(s) Oral daily  dextrose 5%. 1000 milliLiter(s) (100 mL/Hr) IV Continuous <Continuous>  dextrose 5%. 1000 milliLiter(s) (50 mL/Hr) IV Continuous <Continuous>  dextrose 50% Injectable 25 Gram(s) IV Push once  dextrose 50% Injectable 12.5 Gram(s) IV Push once  dextrose 50% Injectable 25 Gram(s) IV Push once  gabapentin 300 milliGRAM(s) Oral two times a day  glucagon  Injectable 1 milliGRAM(s) IntraMuscular once  hydrALAZINE 50 milliGRAM(s) Oral three times a day  insulin glargine Injectable (LANTUS) 10 Unit(s) SubCutaneous at bedtime  insulin lispro (ADMELOG) corrective regimen sliding scale   SubCutaneous three times a day before meals  insulin lispro (ADMELOG) corrective regimen sliding scale   SubCutaneous at bedtime  isosorbide   dinitrate Tablet (ISORDIL) 10 milliGRAM(s) Oral three times a day  levoFLOXacin  Tablet 250 milliGRAM(s) Oral every 24 hours  metoprolol succinate ER 12.5 milliGRAM(s) Oral daily  pantoprazole    Tablet 40 milliGRAM(s) Oral before breakfast  polyethylene glycol 3350 17 Gram(s) Oral daily  rosuvastatin 20 milliGRAM(s) Oral at bedtime  senna 2 Tablet(s) Oral at bedtime      VITAL:  T(C): , Max: 36.7 (06-09-25 @ 16:18)  T(F): , Max: 98.1 (06-09-25 @ 16:18)  HR: 72 (06-10-25 @ 05:31)  BP: 138/69 (06-10-25 @ 05:31)  BP(mean): --  RR: 18 (06-10-25 @ 05:31)  SpO2: 96% (06-10-25 @ 05:31)  Wt(kg): --    I and O's:    06-09 @ 07:01  -  06-10 @ 07:00  --------------------------------------------------------  IN: 0 mL / OUT: 1500 mL / NET: -1500 mL    06-10 @ 07:01  -  06-10 @ 12:06  --------------------------------------------------------  IN: 0 mL / OUT: 900 mL / NET: -900 mL          PHYSICAL EXAM:    Constitutional: NAD  HEENT: PERRLA, EOMI,  MMM  Neck: No LAD, No JVD  Respiratory: diminished   Cardiovascular: S1 and S2  Gastrointestinal: BS+, soft, NT/ND  Extremities: No peripheral edema  Neurological: A/O x 3, no focal deficits  Psychiatric: Normal mood, normal affect  : No Min  Skin: No rashes  Access: Not applicable    LABS:                        9.2    2.07  )-----------( 87       ( 10 Koby 2025 06:00 )             27.8     06-10    139  |  97[L]  |  74[H]  ----------------------------<  155[H]  3.6   |  26  |  2.96[H]    Ca    7.2[L]      10 Koby 2025 06:00  Phos  5.1     06-10  Mg     2.30     06-10    TPro  6.3  /  Alb  3.8  /  TBili  0.7  /  DBili  x   /  AST  15  /  ALT  12  /  AlkPhos  79  06-10      Urine Studies:  Urinalysis Basic - ( 10 Koby 2025 06:00 )    Color: x / Appearance: x / SG: x / pH: x  Gluc: 155 mg/dL / Ketone: x  / Bili: x / Urobili: x   Blood: x / Protein: x / Nitrite: x   Leuk Esterase: x / RBC: x / WBC x   Sq Epi: x / Non Sq Epi: x / Bacteria: x        Assessment and Plan:   · Assessment	        83 Y/O M PMH MDS, HTN, CKD 3, A Fib, Anemia, HTN, Prostate CA, gout, obesity, presents with 3 days of orthopnea, increasing shortness of breath and weight gain  CHF   CKD stage 4   Anemia, thrombocytopenia, leukopenia - Pancytopenia   Severe AS        Plan:    1 Renal- creatinine remains the same- overall stable. Plan to d/c Bumex at per CHF recommendation and repeat CXR  Trend serum creatinine and strict ins and outs   Encourage po intake   No IVF's  Hypokalemia- borderline low likely dt diuresis; mild.  Give KCL 20 meq PO x 1 today   2 CVS- BP stable, now off nitro gtt and not on BBlockers at present; on Hydralazine 37.5mg po tid and Isordil 10 mg po tid; plan to increase Hydralazine to 75 mg TID as per CHF   Right and left heart cath TBD- no renal objection (creatinine noted)   TTE revealed severe AS; Plan for TAVR evaluation this admission  3 Pulm- now off bipap and on nasal cannula   May need CT of the chest to assess the effusions and see if he would benefit from possible thoracentesis   4 Heme - Serial cbc's; Hgb improved     Mayra Haywood NP  [facility]   [phone]      Patient seen and examined    REVIEW OF SYSTEMS:  As per HPI, otherwise 8 full 10 ROS were unremarkable    MEDICATIONS  (STANDING):  allopurinol 200 milliGRAM(s) Oral daily  apixaban      apixaban 2.5 milliGRAM(s) Oral two times a day  bumetanide 2 milliGRAM(s) Oral every 12 hours  chlorhexidine 2% Cloths 1 Application(s) Topical <User Schedule>  clopidogrel Tablet 75 milliGRAM(s) Oral daily  dextrose 5%. 1000 milliLiter(s) (100 mL/Hr) IV Continuous <Continuous>  dextrose 5%. 1000 milliLiter(s) (50 mL/Hr) IV Continuous <Continuous>  dextrose 50% Injectable 25 Gram(s) IV Push once  dextrose 50% Injectable 12.5 Gram(s) IV Push once  dextrose 50% Injectable 25 Gram(s) IV Push once  gabapentin 300 milliGRAM(s) Oral two times a day  glucagon  Injectable 1 milliGRAM(s) IntraMuscular once  hydrALAZINE 50 milliGRAM(s) Oral three times a day  insulin glargine Injectable (LANTUS) 10 Unit(s) SubCutaneous at bedtime  insulin lispro (ADMELOG) corrective regimen sliding scale   SubCutaneous three times a day before meals  insulin lispro (ADMELOG) corrective regimen sliding scale   SubCutaneous at bedtime  isosorbide   dinitrate Tablet (ISORDIL) 10 milliGRAM(s) Oral three times a day  levoFLOXacin  Tablet 250 milliGRAM(s) Oral every 24 hours  metoprolol succinate ER 12.5 milliGRAM(s) Oral daily  pantoprazole    Tablet 40 milliGRAM(s) Oral before breakfast  polyethylene glycol 3350 17 Gram(s) Oral daily  rosuvastatin 20 milliGRAM(s) Oral at bedtime  senna 2 Tablet(s) Oral at bedtime      VITAL:  T(C): , Max: 36.7 (06-09-25 @ 16:18)  T(F): , Max: 98.1 (06-09-25 @ 16:18)  HR: 72 (06-10-25 @ 05:31)  BP: 138/69 (06-10-25 @ 05:31)  BP(mean): --  RR: 18 (06-10-25 @ 05:31)  SpO2: 96% (06-10-25 @ 05:31)  Wt(kg): --    I and O's:    06-09 @ 07:01  -  06-10 @ 07:00  --------------------------------------------------------  IN: 0 mL / OUT: 1500 mL / NET: -1500 mL    06-10 @ 07:01  -  06-10 @ 12:06  --------------------------------------------------------  IN: 0 mL / OUT: 900 mL / NET: -900 mL          PHYSICAL EXAM:    Constitutional: NAD  HEENT: PERRLA, EOMI,  MMM  Neck: No LAD, No JVD  Respiratory: diminished   Cardiovascular: S1 and S2  Gastrointestinal: BS+, soft, NT/ND  Extremities: No peripheral edema  Neurological: A/O x 3, no focal deficits  Psychiatric: Normal mood, normal affect  : + Min  Skin: No rashes  Access: Not applicable    LABS:                        9.2    2.07  )-----------( 87       ( 10 Koby 2025 06:00 )             27.8     06-10    139  |  97[L]  |  74[H]  ----------------------------<  155[H]  3.6   |  26  |  2.96[H]    Ca    7.2[L]      10 Koby 2025 06:00  Phos  5.1     06-10  Mg     2.30     06-10    TPro  6.3  /  Alb  3.8  /  TBili  0.7  /  DBili  x   /  AST  15  /  ALT  12  /  AlkPhos  79  06-10      Urine Studies:  Urinalysis Basic - ( 10 Koby 2025 06:00 )    Color: x / Appearance: x / SG: x / pH: x  Gluc: 155 mg/dL / Ketone: x  / Bili: x / Urobili: x   Blood: x / Protein: x / Nitrite: x   Leuk Esterase: x / RBC: x / WBC x   Sq Epi: x / Non Sq Epi: x / Bacteria: x        Assessment and Plan:   · Assessment	        83 Y/O M PMH MDS, HTN, CKD 3, A Fib, Anemia, HTN, Prostate CA, gout, obesity, presents with 3 days of orthopnea, increasing shortness of breath and weight gain  CHF   CKD stage 4   Anemia, thrombocytopenia, leukopenia - Pancytopenia   Severe AS        Plan:    1 Renal- creatinine remains the same- overall stable. Plan to d/c Bumex at per CHF recommendation and repeat CXR  Trend serum creatinine and strict ins and outs   Encourage po intake   No IVF's  Hypokalemia- borderline low likely dt diuresis; mild.  Give KCL 20 meq PO x 1 today   2 CVS- BP stable, now off nitro gtt and not on BBlockers at present; on Hydralazine 37.5mg po tid and Isordil 10 mg po tid; plan to increase Hydralazine to 75 mg TID as per CHF   Right and left heart cath TBD- no renal objection (creatinine noted)   TTE revealed severe AS; Plan for TAVR evaluation this admission  3 Pulm- now off bipap and on nasal cannula   May need CT of the chest to assess the effusions and see if he would benefit from possible thoracentesis   4 Heme - Serial cbc's; Hgb improved     Mayra Haywood NP  [facility]   [phone]

## 2025-06-10 NOTE — PROGRESS NOTE ADULT - PROBLEM SELECTOR PLAN 1
Appears to have low JVP, confirmed by IVC pocus. Suggesting hypovolemia.   Hypertensive.   BUN/Cr remains elevated.   D/c Bumex. Give NS 50 ml/hr x 5 hrs.   Increase hydralazine to 75 mg Q8H (hold SBP<90)  Continue ISDN 10mg Q8H (hold SBP<90)  LHC - may not be possible due to elevated cr.   TTE revealed severe AS; Plan for TAVR evaluation this admission (will be limited by Cr)- If patient not a TAVR candidate will hold off on [facility]. Await TAVR team plan.   Strict I/O  Daily Standing Weights  Monitor Lytes Replete K to keep 4.0-5.0 and Mg>2.0  Get CXR.   Informed primary team of the above.

## 2025-06-10 NOTE — PROGRESS NOTE ADULT - SUBJECTIVE AND OBJECTIVE BOX
Patient seen and examined at bedside. HDS.  Pt currently denies CP, SOB, palpitations, diaphoresis, dizziness, Syncope, LE swelling, acute bleed or any other complaints at this time    - No events on telemetry overnight, remains in sinus rhythm      PERTINENT REVIEW OF SYSTEMS:  Constitutional:     [ ] negative [ ] fevers [ ] chills [ ] weight loss [ ] weight gain  CV:                         [ ] negative  [ ] chest pain [ ] orthopnea [ ] palpitations [ ] murmur  Resp:                     [ ] negative [ ] cough [ ] shortness of breath [ ] dyspnea [ ] wheezing [ ] sputum [ ]hemoptysis  GI:                          [ ] negative [ ] nausea [ ] vomiting [ ] diarrhea [ ] constipation [ ] abd pain [ ] dysphagia   Skin:                       [ ] negative [ ] rash [ ] itch  Neurological:        [ ] negative [ ] headache [ ] dizziness [ ] syncope [ ] weakness [ ] numbness  Psychiatric:           [ ] negative [ ] anxiety [ ] depression  Endocrine:            [ ] negative [ ] diabetes [ ] thyroid problem  Heme/Lymph:      [ ] negative [ ] anemia [ ] bleeding problem  Allergic/Immune: [ ] negative [ ] itchy eyes [ ] nasal discharge [ ] hives [ ] angioedema    [x] All other systems negative  [ ] Unable to assess ROS due to    Home Medications  Home Medications:  allopurinol 100 mg oral tablet: 2 tab(s) orally once a day (03 Jun 2025 01:31)  amLODIPine 5 mg oral tablet: 1 tab(s) orally once a day (03 Jun 2025 01:31)  aspirin 81 mg oral capsule: 1 cap(s) orally once a day (03 Jun 2025 01:31)  gabapentin 300 mg oral capsule: 1 cap(s) orally 3 times a day (04 Jun 2025 15:46)  levoFLOXacin 500 mg oral tablet: 1 tab(s) orally once a day (04 Jun 2025 15:47)  omeprazole 20 mg oral delayed release tablet: 1 tab(s) orally once a day (03 Jun 2025 01:31)  Plavix 75 mg oral tablet: 1 tab(s) orally once a day (03 Jun 2025 01:31)  rosuvastatin 10 mg oral tablet: 1 tab(s) orally once a day (03 Jun 2025 01:31)      Current Meds:  acetaminophen     Tablet .. 650 milliGRAM(s) Oral every 6 hours PRN  allopurinol 200 milliGRAM(s) Oral daily  apixaban      apixaban 2.5 milliGRAM(s) Oral two times a day  chlorhexidine 2% Cloths 1 Application(s) Topical <User Schedule>  clopidogrel Tablet 75 milliGRAM(s) Oral daily  dextrose 5%. 1000 milliLiter(s) IV Continuous <Continuous>  dextrose 5%. 1000 milliLiter(s) IV Continuous <Continuous>  dextrose 50% Injectable 25 Gram(s) IV Push once  dextrose 50% Injectable 12.5 Gram(s) IV Push once  dextrose 50% Injectable 25 Gram(s) IV Push once  dextrose Oral Gel 15 Gram(s) Oral once PRN  gabapentin 300 milliGRAM(s) Oral two times a day  glucagon  Injectable 1 milliGRAM(s) IntraMuscular once  hydrALAZINE 75 milliGRAM(s) Oral three times a day  insulin glargine Injectable (LANTUS) 10 Unit(s) SubCutaneous at bedtime  insulin lispro (ADMELOG) corrective regimen sliding scale   SubCutaneous three times a day before meals  insulin lispro (ADMELOG) corrective regimen sliding scale   SubCutaneous at bedtime  isosorbide   dinitrate Tablet (ISORDIL) 10 milliGRAM(s) Oral three times a day  levoFLOXacin  Tablet 250 milliGRAM(s) Oral every 24 hours  melatonin 3 milliGRAM(s) Oral at bedtime PRN  metoprolol succinate ER 12.5 milliGRAM(s) Oral daily  pantoprazole    Tablet 40 milliGRAM(s) Oral before breakfast  polyethylene glycol 3350 17 Gram(s) Oral daily  rosuvastatin 20 milliGRAM(s) Oral at bedtime  senna 2 Tablet(s) Oral at bedtime      PAST MEDICAL & SURGICAL HISTORY:  HTN - Hypertension      Hypercholesterolemia      PC (prostate cancer)  s/p surgical resection 3 years ago      Gout      Aneurysm, ascending aorta      H/O prostatectomy      H/O carotid endarterectomy      H/O renal artery stenosis  s/p stent in Left RA      Status post cataract extraction, unspecified laterality          Vitals:  T(F): 97.4 (06-10), Max: 98.1 (06-09)  HR: 80 (06-10) (68 - 89)  BP: 97/57 (06-10) (97/57 - 138/69)  RR: 16 (06-10)  SpO2: 99% (06-10)  I&O's Summary    09 Jun 2025 07:01  -  10 Koby 2025 07:00  --------------------------------------------------------  IN: 0 mL / OUT: 1500 mL / NET: -1500 mL    10 Koby 2025 07:01  -  10 Koby 2025 13:32  --------------------------------------------------------  IN: 0 mL / OUT: 900 mL / NET: -900 mL    Physical Exam:  Appearance: No acute distress; well appearing  Neck: Supple, no JVD B/L, no Carotid Bruit B/L  Cardiovascular: RRR, S1, S2, no murmurs, rubs, or gallops, no edema  Respiratory: Clear to auscultation bilaterally, no RRW B/L  Gastrointestinal: soft, non-tender, non-distended with normal bowel sounds  Neurologic: No focal or neuro deficits noted  Psychiatry: AAOx3, mood & affect appropriate  Extremities:   No LE swelling bilaterally, palpable pulses throughout                        9.2    2.07  )-----------( 87       ( 10 Koby 2025 06:00 )             27.8     06-10    139  |  97[L]  |  74[H]  ----------------------------<  155[H]  3.6   |  26  |  2.96[H]    Ca    7.2[L]      10 Koby 2025 06:00  Phos  5.1     06-10  Mg     2.30     06-10    TPro  6.3  /  Alb  3.8  /  TBili  0.7  /  DBili  x   /  AST  15  /  ALT  12  /  AlkPhos  79  06-10    Cardiac Imaging  ---------------------  < from: TTE W or WO Ultrasound Enhancing Agent (06.04.25 @ 06:31) >    CONCLUSIONS:      1. The left ventricular cavity is normal in size. Left ventricular wall thickness is normal. Left ventricular systolic function is mildly to moderately decreased with an ejection fraction visually estimated at 40 to 45%. There are regional wall motion abnormalities present. Hypokinesis of the inferior and inferolateral walls.   2. Normal right ventricular cavity size and probably normal right ventricular systolic function. Tricuspid annular plane systolic excursion (TAPSE) is 2.0 cm (normal >=1.7 cm).   3. Structurally normal mitral valve with normal leaflet excursion. There is calcification of the mitral valve annulus. There is mild leaflet calcification. There is mild to moderatemitral regurgitation.   4. The aortic valve anatomy cannot be determined. There is calcification of the aortic valve leaflets. The peak transaortic velocity is 3.51 m/s, peak transaortic gradient is 49.3 mmHg and mean transaortic gradient is 26.0 mmHg with an LVOT/aortic valve VTI ratio of 0.25. The gross appearance of the aortic valve is consistent with significant aortic stenosis. However, unable to accurately estimate the aortic valve area. There is mild aortic regurgitation.   5. The left atrium is moderately dilated with an indexed volume of 45.18 ml/m².   6. The tricuspid valve is structurally normal with normal leaflet excursion. There is mild tricuspid regurgitation. Estimated pulmonary artery systolic pressure is 42 mmHg, consistent with mild pulmonary hypertension.   7. Left pleural effusion noted.   8. No prior echocardiogram is available for comparison.    ____________________________________________________________________  Recommendations:  Consider JEAN for further evaluation of the aortic valve, if clinically indicated.     ==========================================================    < from: TTE Limited W or WO Ultrasound Enhancing Agent (12.23.24 @ 09:44) >     CONCLUSIONS:      1. No echocardiographic evidence of vegetations.   2. Moderate mitral regurgitation.   3. Mild aortic regurgitation.   4. Trace pulmonic regurgitation.   5. Trace tricuspid regurgitation.   6. Trileaflet aortic valve with reduced systolic excursion. There is calcification of the aortic valve leaflets.   7. Compared to the transthoracic echocardiogram performed on 6/11/2024, there have been no significant interval changes.    =================================================================    < from: TTE Limited W or WO Ultrasound Enhancing Agent (06.09.25 @ 16:38) >     CONCLUSIONS:      1. The inferior vena cava is normal in size measuring 1.73 cm in diameter, (normal <2.1cm) with normal inspiratory collapse (normal >50%) consistent with normal right atrial pressure (~3, range 0-5mmHg).    ________________________________________________________________________________________    Telemetry:  Sinus Rhythm

## 2025-06-10 NOTE — PROGRESS NOTE ADULT - ASSESSMENT
85 y/o male, PMHx of CAD (C 3/18/21 mild LAD 60%, OM1 60%, Prox  ), HFrEF (JEAN 12/26/24 LVEF 35%, trace TR, MR), AFIB (on Eliquis), Renal Artery Stenosis, s/p left renal stent, HTN, Anemia, Prostate Cancer, MDS currently on chemotherapy via PICC line, last dose was in mid April. admitted for decompensated on chronic HFrEF and admitted to CCU for further management.  Heart Failure team consulted, Overall stage C HF, NYHA class IV-severely hypervolemic and hypoxic requiring BIPAP.  Subsequent repeat TTE done 6/4/25 showing significant aortic stenosis, possible severe. Interventional cardiology consulted for TAVR evaluation.  Stepped down from CCU to regional floor for further management    # Aortic Stenosis  # Acute systolic heart failure     - Euvolemic on exam, SpO2 999-100%, O2, 2L NC, tolerating well  - TTE 6/4:  EF 40-45%, RWMA present.  Hypokinesis of the inferior and inferolateral walls.  Moderate MR. pVel 3.51, pGrad 49.3, mGrad 26.0, LVOT/aortic valve VTI ratio of 0.25.  The gross appearance of the aortic valve is consistent with significant aortic stenosis. However, unable to accurately estimate the aortic valve area.  Mild to moderate AR.   - Interventional cards consulted.  Discussed with patient, details and indication for further TAVR evaluation with Right/Left Heart Cath, JEAN and CTA TAVR, to which he is amendable   - Nitro gtt d/c'ed 6/5  - HF following, recs appreciated.  IV/PO Bumex d/ricardo.  Hydralazine increased to 75mg TID and Isordil 10mg TID (hold SBP < 100)  - monitor electrolytes, replete to keep K > 4, Mg > 2  - Strict I/Os. Daily weights, fluid restriction  - case discussed with interventionalist, Dr Mendoza. , R/LHC postponed until patient is medically optimized      # CAD  # USHA  - s/p Dx [facility]  2021:  LM normal, LAD 60%, OM1 60%, %   - s/p L Renal stent on DAPT  - continue Rosuvastatin 20mg daily at bedtime    # JUAN RAMON on CKD  - CKD stage 3, baseline Cr ~ 2  - Nephro following, recs appreciated  - Cr stable 2.96 today.  Trend BUN/Cr daily. Avoid nephrotoxic agents  - h/o Renal artery stent, on DAPT  - Further plan per Nephro    # Paroxysmal Atrial Fibrillation   - NSR, HR stable 80s  - EP consulted, recs appreciated  - continue Eliquis 2.5mg BID (renally dosed)  - started on Metoprolol Succ 12.5mg daily    # Anemia  # Myelodysplastic Syndrome (MDS)  - History of MDS, last chemo dose 5/20/25   - Follows with outpatient HemeOnc  - H/H stable 9s, Plts improved 87  - On Levofloxacin for neutropenia  - Further plan per primary team       85 y/o male, PMHx of CAD (C 3/18/21 mild LAD 60%, OM1 60%, Prox  ), HFrEF (JEAN 12/26/24 LVEF 35%, trace TR, MR), AFIB (on Eliquis), Renal Artery Stenosis, s/p left renal stent, HTN, Anemia, Prostate Cancer, MDS currently on chemotherapy via PICC line, last dose was in mid April. admitted for decompensated on chronic HFrEF and admitted to CCU for further management.  Heart Failure team consulted, Overall stage C HF, NYHA class IV-severely hypervolemic and hypoxic requiring BIPAP.  Subsequent repeat TTE done 6/4/25 showing significant aortic stenosis, possible severe. Interventional cardiology consulted for TAVR evaluation.  Stepped down from CCU to regional floor for further management    # Aortic Stenosis  # Acute systolic heart failure     - Euvolemic on exam, SpO2 %, O2, 2L NC, tolerating well  - TTE 6/4:  EF 40-45%, RWMA present.  Hypokinesis of the inferior and inferolateral walls.  Moderate MR. pVel 3.51, pGrad 49.3, mGrad 26.0, LVOT/aortic valve VTI ratio of 0.25.  The gross appearance of the aortic valve is consistent with significant aortic stenosis. However, unable to accurately estimate the aortic valve area.  Mild to moderate AR.   - Interventional cards consulted.  Discussed with patient, details and indication for further TAVR evaluation with Right/Left Heart Cath, JEAN and CTA TAVR, to which he is amendable   - Nitro gtt d/c'ed 6/5  - HF following, recs appreciated.  IV/PO Bumex d/ricardo.  Hydralazine increased to 75mg TID and Isordil 10mg TID (hold SBP < 100)  - monitor electrolytes, replete to keep K > 4, Mg > 2  - Strict I/Os. Daily weights, fluid restriction  - case discussed with interventionalist, Dr Mendoza. , R/LHC postponed until patient is medically optimized      # CAD  # USHA  - s/p Dx [facility]  2021:  LM normal, LAD 60%, OM1 60%, %   - s/p L Renal stent on DAPT  - continue Rosuvastatin 20mg daily at bedtime    # JUAN RAMON on CKD  - CKD stage 3, baseline Cr ~ 2  - Nephro following, recs appreciated  - Cr stable 2.96 today.  Trend BUN/Cr daily. Avoid nephrotoxic agents  - h/o Renal artery stent, on DAPT  - Further plan per Nephro    # Paroxysmal Atrial Fibrillation   - NSR, HR stable 80s  - EP consulted, recs appreciated  - continue Eliquis 2.5mg BID (renally dosed)  - started on Metoprolol Succ 12.5mg daily    # Anemia  # Myelodysplastic Syndrome (MDS)  - History of MDS, last chemo dose 5/20/25   - Follows with outpatient HemeOnc  - H/H stable 9s, Plts improved 87  - On Levofloxacin for neutropenia  - Further plan per primary team       83 y/o male, PMHx of CAD (C 3/18/21 mild LAD 60%, OM1 60%, Prox  ), HFrEF (JEAN 12/26/24 LVEF 35%, trace TR, MR), AFIB (on Eliquis), Renal Artery Stenosis, s/p left renal stent, HTN, Anemia, Prostate Cancer, MDS currently on chemotherapy via PICC line, last dose was in mid April. admitted for decompensated on chronic HFrEF and admitted to CCU for further management.  Heart Failure team consulted, Overall stage C HF, NYHA class IV-severely hypervolemic and hypoxic requiring BIPAP.  Subsequent repeat TTE done 6/4/25 showing significant aortic stenosis, possible severe. Interventional cardiology consulted for TAVR evaluation.  Stepped down from CCU to regional floor for further management    # Aortic Stenosis  # Acute systolic heart failure     - Euvolemic on exam, SpO2 %, O2, 2L NC, tolerating well  - TTE 6/4:  EF 40-45%, RWMA present.  Hypokinesis of the inferior and inferolateral walls.  Moderate MR. pVel 3.51, pGrad 49.3, mGrad 26.0, LVOT/aortic valve VTI ratio of 0.25.  The gross appearance of the aortic valve is consistent with significant aortic stenosis. However, unable to accurately estimate the aortic valve area.  Mild to moderate AR.   - Interventional cards consulted.  Discussed with patient, details and indication for further TAVR evaluation with Right/Left Heart Cath, JEAN and CTA TAVR, to which he is amendable   - Nitro gtt d/c'ed 6/5  - HF following, recs appreciated.  IV/PO Bumex d/ricardo.  Hydralazine increased to 75mg TID and Isordil 10mg TID (hold SBP < 100)  - monitor electrolytes, replete to keep K > 4, Mg > 2  - Strict I/Os. Daily weights, fluid restriction  - case discussed with interventionalist, Dr Mendoza.  R/[facility] postponed until patient is medically optimized      # CAD  # USHA  - s/p Dx [facility]  2021:  LM normal, LAD 60%, OM1 60%, %   - s/p L Renal stent on DAPT  - continue Rosuvastatin 20mg daily at bedtime    # JUAN RAMON on CKD  - CKD stage 3, baseline Cr ~ 2  - Nephro following, recs appreciated  - Cr stable 2.96 today.  Trend BUN/Cr daily. Avoid nephrotoxic agents  - h/o Renal artery stent, on DAPT  - Further plan per Nephro    # Paroxysmal Atrial Fibrillation   - NSR, HR stable 80s  - EP consulted, recs appreciated  - continue Eliquis 2.5mg BID (renally dosed)  - started on Metoprolol Succ 12.5mg daily    # Anemia  # Myelodysplastic Syndrome (MDS)  - History of MDS, last chemo dose 5/20/25   - Follows with outpatient HemeOnc  - H/H stable 9s, Plts improved 87  - On Levofloxacin for neutropenia  - Further plan per primary team

## 2025-06-10 NOTE — PROGRESS NOTE ADULT - NS ATTEND AMEND GEN_ALL_CORE FT
A/w acute hypoxemic resp failure i/s/o possible severe AS.    CXR on 6/5 showed persistent pleural effusions.  R/LHC on hold until determination made whether pt is a suitable TAVR candidate.  SCr remains ~3.0.  TTE yesterday showed likely nl CVP.    Would repeat CXR.  D/c Bumex.  Increase hydralazine to 75 mg po tid. A/w acute hypoxemic resp failure i/s/o possible severe AS.    CXR on 6/5 showed persistent pleural effusions.  R/LHC on hold until determination made whether pt is a suitable TAVR candidate.  SCr remains ~3.0.  TTE yesterday showed likely nl CVP.    Would repeat CXR.  D/c Bumex.  Increase hydralazine to 75 mg po tid.    PA addendum:  As d/w Dr. Carver, pt is hypovolemic, give NS 50 ml/hr x 5 hrs. A/w acute hypoxemic resp failure i/s/o possible severe AS.    CXR on 6/5 showed persistent pleural effusions.  R/LHC on hold until determination made whether pt is a suitable TAVR candidate.  SCr remains ~3.0.  TTE yesterday showed likely nl CVP.    Would repeat CXR.  D/c Bumex.  Increase hydralazine to 75 mg po tid.    PA addendum:  As d/w Dr. Carver, pt is hypovolemic, give NS 50 ml/hr x 5 hrs. Informed primary team of the above.

## 2025-06-10 NOTE — PROGRESS NOTE ADULT - SUBJECTIVE AND OBJECTIVE BOX
Medications:  acetaminophen     Tablet .. 650 milliGRAM(s) Oral every 6 hours PRN  allopurinol 200 milliGRAM(s) Oral daily  apixaban      apixaban 2.5 milliGRAM(s) Oral two times a day  bumetanide 2 milliGRAM(s) Oral every 12 hours  chlorhexidine 2% Cloths 1 Application(s) Topical <User Schedule>  clopidogrel Tablet 75 milliGRAM(s) Oral daily  dextrose 5%. 1000 milliLiter(s) IV Continuous <Continuous>  dextrose 5%. 1000 milliLiter(s) IV Continuous <Continuous>  dextrose 50% Injectable 25 Gram(s) IV Push once  dextrose 50% Injectable 12.5 Gram(s) IV Push once  dextrose 50% Injectable 25 Gram(s) IV Push once  dextrose Oral Gel 15 Gram(s) Oral once PRN  gabapentin 300 milliGRAM(s) Oral two times a day  glucagon  Injectable 1 milliGRAM(s) IntraMuscular once  hydrALAZINE 50 milliGRAM(s) Oral three times a day  insulin glargine Injectable (LANTUS) 10 Unit(s) SubCutaneous at bedtime  insulin lispro (ADMELOG) corrective regimen sliding scale   SubCutaneous three times a day before meals  insulin lispro (ADMELOG) corrective regimen sliding scale   SubCutaneous at bedtime  isosorbide   dinitrate Tablet (ISORDIL) 10 milliGRAM(s) Oral three times a day  levoFLOXacin  Tablet 250 milliGRAM(s) Oral every 24 hours  melatonin 3 milliGRAM(s) Oral at bedtime PRN  metoprolol succinate ER 12.5 milliGRAM(s) Oral daily  pantoprazole    Tablet 40 milliGRAM(s) Oral before breakfast  polyethylene glycol 3350 17 Gram(s) Oral daily  rosuvastatin 20 milliGRAM(s) Oral at bedtime  senna 2 Tablet(s) Oral at bedtime      Vitals:  Vital Signs Last 24 Hrs  T(C): 36.3 (10 Koby 2025 05:31), Max: 36.7 (2025 16:18)  T(F): 97.3 (10 Koby 2025 05:31), Max: 98.1 (2025 16:18)  HR: 72 (10 Koby 2025 05:31) (68 - 89)  BP: 138/69 (10 Koby 2025 05:31) (118/59 - 139/73)  BP(mean): --  RR: 18 (10 Koby 2025 05:31) (18 - 18)  SpO2: 96% (10 Koby 2025 05:31) (96% - 99%)    Parameters below as of 10 Koby 2025 05:31  Patient On (Oxygen Delivery Method): nasal cannula        Daily     Daily Weight in k.9 (10 Koby 2025 07:04)    I&O's Detail    2025 07:01  -  10 Koby 2025 07:00  --------------------------------------------------------  IN:  Total IN: 0 mL    OUT:    Indwelling Catheter - Urethral (mL): 1500 mL  Total OUT: 1500 mL    Total NET: -1500 mL      10 Koby 2025 07:01  -  10 Koby 2025 10:46  --------------------------------------------------------  IN:  Total IN: 0 mL    OUT:    Voided (mL): 900 mL  Total OUT: 900 mL    Total NET: -900 mL          Physical Exam:     General: No distress. Comfortable.  HEENT: EOM intact.  Neck: Neck supple. JVP not elevated. No masses  Chest: Clear to auscultation bilaterally  CV: Normal S1 and S2. No murmurs, rub, or gallops. Radial pulses normal.  Abdomen: Soft, non-distended, non-tender  Skin: No rashes or skin breakdown  Neurology: Alert and oriented times three. Sensation intact  Psych: Affect normal    Labs:                        9.2    2.07  )-----------( 87       ( 10 Koby 2025 06:00 )             27.8     06-10    139  |  97[L]  |  74[H]  ----------------------------<  155[H]  3.6   |  26  |  2.96[H]    Ca    7.2[L]      10 Koby 2025 06:00  Phos  5.1     06-10  Mg     2.30     06-10    TPro  6.3  /  Alb  3.8  /  TBili  0.7  /  DBili  x   /  AST  15  /  ALT  12  /  AlkPhos  79  06-10           Patient seen and examined. He states he feels well- no SOB at rest, FOX, CP, palpitations, dizziness.   Aide by bedside.       Medications:  acetaminophen     Tablet .. 650 milliGRAM(s) Oral every 6 hours PRN  allopurinol 200 milliGRAM(s) Oral daily  apixaban      apixaban 2.5 milliGRAM(s) Oral two times a day  bumetanide 2 milliGRAM(s) Oral every 12 hours  chlorhexidine 2% Cloths 1 Application(s) Topical <User Schedule>  clopidogrel Tablet 75 milliGRAM(s) Oral daily  dextrose 5%. 1000 milliLiter(s) IV Continuous <Continuous>  dextrose 5%. 1000 milliLiter(s) IV Continuous <Continuous>  dextrose 50% Injectable 25 Gram(s) IV Push once  dextrose 50% Injectable 12.5 Gram(s) IV Push once  dextrose 50% Injectable 25 Gram(s) IV Push once  dextrose Oral Gel 15 Gram(s) Oral once PRN  gabapentin 300 milliGRAM(s) Oral two times a day  glucagon  Injectable 1 milliGRAM(s) IntraMuscular once  hydrALAZINE 50 milliGRAM(s) Oral three times a day  insulin glargine Injectable (LANTUS) 10 Unit(s) SubCutaneous at bedtime  insulin lispro (ADMELOG) corrective regimen sliding scale   SubCutaneous three times a day before meals  insulin lispro (ADMELOG) corrective regimen sliding scale   SubCutaneous at bedtime  isosorbide   dinitrate Tablet (ISORDIL) 10 milliGRAM(s) Oral three times a day  levoFLOXacin  Tablet 250 milliGRAM(s) Oral every 24 hours  melatonin 3 milliGRAM(s) Oral at bedtime PRN  metoprolol succinate ER 12.5 milliGRAM(s) Oral daily  pantoprazole    Tablet 40 milliGRAM(s) Oral before breakfast  polyethylene glycol 3350 17 Gram(s) Oral daily  rosuvastatin 20 milliGRAM(s) Oral at bedtime  senna 2 Tablet(s) Oral at bedtime      Vitals:  Vital Signs Last 24 Hrs  T(C): 36.3 (10 Koby 2025 05:31), Max: 36.7 (2025 16:18)  T(F): 97.3 (10 Koby 2025 05:31), Max: 98.1 (2025 16:18)  HR: 72 (10 Koby 2025 05:31) (68 - 89)  BP: 138/69 (10 Koby 2025 05:31) (118/59 - 139/73)  BP(mean): --  RR: 18 (10 Koby 2025 05:) (18 - 18)  SpO2: 96% (10 Koby 2025 05:) (96% - 99%)    Parameters below as of 10 Koby 2025 05:31  Patient On (Oxygen Delivery Method): nasal cannula        Daily     Daily Weight in k.9 (10 Koby 2025 07:04)    I&O's Detail    2025 07:01  -  10 Koby 2025 07:00  --------------------------------------------------------  IN:  Total IN: 0 mL    OUT:    Indwelling Catheter - Urethral (mL): 1500 mL  Total OUT: 1500 mL    Total NET: -1500 mL      10 Koby 2025 07:01  -  10 Koby 2025 10:46  --------------------------------------------------------  IN:  Total IN: 0 mL    OUT:    Voided (mL): 900 mL  Total OUT: 900 mL    Total NET: -900 mL          Physical Exam:     General: No distress. Comfortable.  HEENT: EOM intact.  Neck: Neck supple. JVP not elevated 6cm. No masses  Chest: Clear to auscultation bilaterally  CV: Normal S1 and S2. No murmurs, rub, or gallops. Radial pulses normal. No LE edema and is warm b/l.   Abdomen: Soft, non-distended, non-tender  Skin: No rashes or skin breakdown  Neurology: Alert and oriented times three. Sensation intact  Psych: Affect normal    Labs:                        9.2    2.07  )-----------( 87       ( 10 Koby 2025 06:00 )             27.8     06-10    139  |  97[L]  |  74[H]  ----------------------------<  155[H]  3.6   |  26  |  2.96[H]    Ca    7.2[L]      10 Koby 2025 06:00  Phos  5.1     06-10  Mg     2.30     06-10    TPro  6.3  /  Alb  3.8  /  TBili  0.7  /  DBili  x   /  AST  15  /  ALT  12  /  AlkPhos  79  06-10

## 2025-06-10 NOTE — PROGRESS NOTE ADULT - SUBJECTIVE AND OBJECTIVE BOX
Patient is a 84y old  Male who presents with a chief complaint of Fluid overload (10 Koby 2025 13:32)      INTERVAL HPI/OVERNIGHT EVENTS:  Seen by me this afternoon, sitting in chair, Son Vance and aide at bedside. No SOB, tolerating PO. No pain. On NC 2L at time of visit.    Review of Systems: 12 point review of systems otherwise negative    MEDICATIONS  (STANDING):  allopurinol 200 milliGRAM(s) Oral daily  apixaban      apixaban 2.5 milliGRAM(s) Oral two times a day  chlorhexidine 2% Cloths 1 Application(s) Topical <User Schedule>  clopidogrel Tablet 75 milliGRAM(s) Oral daily  dextrose 5%. 1000 milliLiter(s) (100 mL/Hr) IV Continuous <Continuous>  dextrose 5%. 1000 milliLiter(s) (50 mL/Hr) IV Continuous <Continuous>  dextrose 50% Injectable 25 Gram(s) IV Push once  dextrose 50% Injectable 12.5 Gram(s) IV Push once  dextrose 50% Injectable 25 Gram(s) IV Push once  gabapentin 300 milliGRAM(s) Oral two times a day  glucagon  Injectable 1 milliGRAM(s) IntraMuscular once  hydrALAZINE 75 milliGRAM(s) Oral three times a day  insulin glargine Injectable (LANTUS) 10 Unit(s) SubCutaneous at bedtime  insulin lispro (ADMELOG) corrective regimen sliding scale   SubCutaneous three times a day before meals  insulin lispro (ADMELOG) corrective regimen sliding scale   SubCutaneous at bedtime  isosorbide   dinitrate Tablet (ISORDIL) 10 milliGRAM(s) Oral three times a day  levoFLOXacin  Tablet 250 milliGRAM(s) Oral every 24 hours  metoprolol succinate ER 12.5 milliGRAM(s) Oral daily  pantoprazole    Tablet 40 milliGRAM(s) Oral before breakfast  polyethylene glycol 3350 17 Gram(s) Oral daily  rosuvastatin 20 milliGRAM(s) Oral at bedtime  senna 2 Tablet(s) Oral at bedtime  sodium chloride 0.9%. 1000 milliLiter(s) (50 mL/Hr) IV Continuous <Continuous>    MEDICATIONS  (PRN):  acetaminophen     Tablet .. 650 milliGRAM(s) Oral every 6 hours PRN Moderate Pain (4 - 6)  dextrose Oral Gel 15 Gram(s) Oral once PRN Blood Glucose LESS THAN 70 milliGRAM(s)/deciliter  melatonin 3 milliGRAM(s) Oral at bedtime PRN Insomnia      Allergies    No Known Allergies    Intolerances          Vital Signs Last 24 Hrs  T(C): 36.6 (10 Koby 2025 22:30), Max: 36.6 (10 Koby 2025 22:30)  T(F): 97.8 (10 Koby 2025 22:30), Max: 97.8 (10 Koby 2025 22:30)  HR: 70 (10 Koby 2025 22:30) (68 - 89)  BP: 115/60 (10 Koby 2025 22:30) (97/57 - 138/69)  BP(mean): 74 (10 Koby 2025 22:30) (74 - 74)  RR: 18 (10 Koby 2025 22:30) (16 - 18)  SpO2: 95% (10 Koby 2025 22:30) (95% - 100%)    Parameters below as of 10 Koby 2025 22:30  Patient On (Oxygen Delivery Method): nasal cannula  O2 Flow (L/min): 2    CAPILLARY BLOOD GLUCOSE      POCT Blood Glucose.: 145 mg/dL (10 Koby 2025 22:31)  POCT Blood Glucose.: 209 mg/dL (10 Koby 2025 17:16)  POCT Blood Glucose.: 130 mg/dL (10 Koby 2025 12:24)  POCT Blood Glucose.: 120 mg/dL (10 Koby 2025 08:31)       @ 07:  -  10 @ 07:00  --------------------------------------------------------  IN: 0 mL / OUT: 1500 mL / NET: -1500 mL    10 @ 07:01  -  06-11 @ 00:17  --------------------------------------------------------  IN: 1000 mL / OUT: 900 mL / NET: 100 mL        Physical Exam:    Daily     Daily Weight in k.9 (10 Koby 2025 07:04)  General:  Well appearing, NAD, not cachetic, on NC 2L  HEENT:  Nonicteric, PERRLA  CV:  RRR, no murmur, no JVD  Lungs:  CTA B/L, no wheezes, rales, rhonchi  Abdomen:  Soft, non-tender, no distended, positive BS  Extremities:  2+ pulses, no c/c, no edema  Skin:  Warm and dry, no rashes  :  +Min in place  Neuro:  AAOx3, non-focal, CN II-XII grossly intact  No Restraints    LABS:                        9.2    2.07  )-----------( 87       ( 10 Koby 2025 06:00 )             27.8     06-10    139  |  97[L]  |  74[H]  ----------------------------<  155[H]  3.6   |  26  |  2.96[H]    Ca    7.2[L]      10 Koby 2025 06:00  Phos  5.1     06-10  Mg     2.30     06-10    TPro  6.3  /  Alb  3.8  /  TBili  0.7  /  DBili  x   /  AST  15  /  ALT  12  /  AlkPhos  79  06-10      Urinalysis Basic - ( 10 Koby 2025 06:00 )    Color: x / Appearance: x / SG: x / pH: x  Gluc: 155 mg/dL / Ketone: x  / Bili: x / Urobili: x   Blood: x / Protein: x / Nitrite: x   Leuk Esterase: x / RBC: x / WBC x   Sq Epi: x / Non Sq Epi: x / Bacteria: x          RADIOLOGY & ADDITIONAL TESTS:  Reviewed by me

## 2025-06-10 NOTE — PROGRESS NOTE ADULT - ASSESSMENT
84-year-old male with a past medical history of myelodysplastic syndrome (MDS), hypertension (HTN), chronic kidney disease stage 3 (CKD 3), atrial fibrillation (A Fib), anemia, prostate cancer, gout, and obesity, presented with acute hypoxic respiratory failure requiring BiPAP secondary to an acute exacerbation of heart failure with reduced ejection fraction (HFrEF). He was transferred to the coronary care unit (CCU) and diuresed. He is now stabilized and has been transferred back to the medical floor.    Active Problems:    Myelodysplastic Syndrome (MDS)  Acute on chronic heart failure with reduced ejection fraction (HFrEF)  Acute hypoxic respiratory failure  Severe Aortic Stenosis  Chronic kidney disease stage 3 (CKD 3)  Atrial fibrillation with rapid ventricular response (A Fib with RVR)/pAFib  Hypercoagulable state  Anemia  Hypertension (HTN)  Prostate cancer  Gout  Steroid induced hyperglycemia  Obesity  Immunosuppressed status    MDS: History of MDS;   Last chemotherapy on May 20th. 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 Per outpatient chart, anemia is also multifactorial (anemia of chronic disease/anemia of renal failure). Patient takes levofloxacin for neutropenia. Continue outpatient follow-up.    Acute on Chronic HFrEF/Acute Hypoxic Respiratory Failure  TTE revealed severe aortic stenosis (AS), repeat limited TTE 6/9 w/ normal RA pressure  Hydralazine increased to 70mg every 8 hours per HF recs. C/w Isosorbide dinitrate (ISDN) 10mg every 8 hours  Holding bumex at this time and giving NS 50 ml/hr x 5 hrs due to low JVP/hypovolemia  R/LHC on hold until determination made whether pt is a suitable TAVR candidate  Resumed Toprol 12.5 mg po qd 6/10  CXR 6/10: Bilateral pleural effusions unchanged  Pending improvement with kidney function -plan for transcatheter aortic valve replacement (TAVR) evaluation this admission (will be limited by creatinine). Continue clopidogrel (Plavix), statin. Strict intake and output monitoring. Daily standing weights. Monitor electrolytes; replete potassium to > 4.0 mEq/L and magnesium to > 2.0 mg/dL  On 2L NC, continue to wean off O2 as tolerated  TOV prior to discharge    Steroid induced hyperglycemia  FS much improved  C/w lantus 10U qhs  C/w SAMIR for now, will consider changing diet to Carb Consistent diet pending further FS's    Acute on chronic CKD 3  Nephrology consulted. Recommendations appreciated  No renal objection for LHC/RHC  Cr seems to be stable around 2.9, off bumex at this time  Monitor BMP in AM    Hypercoagulable State/A Fib with RVR, pAFib  On apixaban (Eliquis)  Now on NSR    Anemia  Monitor as above (under MDS)    Hypertension  Continue medications as above for heart failure    PT eval --> [facility] but pt wants to go back home with continued 24/7 care  Discussed with Son Vance at bedside on 6/10.

## 2025-06-10 NOTE — PROGRESS NOTE ADULT - ASSESSMENT
85 y/o male with PMHX of CAD (LHC 3/18/21 mild LAD 60%, OM1 60%, Prox  ), HFrEF (JEAN 12/26/24 LVEF 35%, trace TR, MR), afib, anemia, renal artery stenosis s/p L renal stent, HTN, prstate CA/ MDS currently on chemotherapy via PICC line, last dose was in mid April.  Pt comes in with complaints of SOB and weight gain. He was found to be hypoxic in ED with O2 sat 80s, was placed on BIPAP. HF consult called on 6/3/25 to help manage care in this patient who is suspected to be in ADHF. Labs show proBNP 6482, lactate 2.5, BUN/Cr 41/2.55, H/H 8.3/25, trop 171/166. CXR shows b/l pleural effusions. Pt was placed on BIPAP,  and was diuresed. He admits to eating very salty meals- frozen dinners frequently. He is compliant with taking all HF medications, given to him by his aide. Overall stage C HF, NYHA class IV-admitted with severe hypervolemia, but has diuresed well and is currently slightly hypovolemic.

## 2025-06-11 ENCOUNTER — TRANSCRIPTION ENCOUNTER (OUTPATIENT)
Age: 85
End: 2025-06-11

## 2025-06-11 LAB
ALBUMIN SERPL ELPH-MCNC: 3.7 G/DL — SIGNIFICANT CHANGE UP (ref 3.3–5)
ALP SERPL-CCNC: 74 U/L — SIGNIFICANT CHANGE UP (ref 40–120)
ALT FLD-CCNC: 14 U/L — SIGNIFICANT CHANGE UP (ref 4–41)
ANION GAP SERPL CALC-SCNC: 18 MMOL/L — HIGH (ref 7–14)
AST SERPL-CCNC: 50 U/L — HIGH (ref 4–40)
BASOPHILS # BLD AUTO: 0.01 K/UL — SIGNIFICANT CHANGE UP (ref 0–0.2)
BASOPHILS NFR BLD AUTO: 0.4 % — SIGNIFICANT CHANGE UP (ref 0–2)
BILIRUB SERPL-MCNC: 0.8 MG/DL — SIGNIFICANT CHANGE UP (ref 0.2–1.2)
BUN SERPL-MCNC: 75 MG/DL — HIGH (ref 7–23)
CALCIUM SERPL-MCNC: 7.4 MG/DL — LOW (ref 8.4–10.5)
CHLORIDE SERPL-SCNC: 101 MMOL/L — SIGNIFICANT CHANGE UP (ref 98–107)
CO2 SERPL-SCNC: 23 MMOL/L — SIGNIFICANT CHANGE UP (ref 22–31)
CREAT SERPL-MCNC: 2.88 MG/DL — HIGH (ref 0.5–1.3)
EGFR: 21 ML/MIN/1.73M2 — LOW
EGFR: 21 ML/MIN/1.73M2 — LOW
EOSINOPHIL # BLD AUTO: 0.06 K/UL — SIGNIFICANT CHANGE UP (ref 0–0.5)
EOSINOPHIL NFR BLD AUTO: 2.7 % — SIGNIFICANT CHANGE UP (ref 0–6)
GLUCOSE BLDC GLUCOMTR-MCNC: 113 MG/DL — HIGH (ref 70–99)
GLUCOSE BLDC GLUCOMTR-MCNC: 164 MG/DL — HIGH (ref 70–99)
GLUCOSE BLDC GLUCOMTR-MCNC: 182 MG/DL — HIGH (ref 70–99)
GLUCOSE BLDC GLUCOMTR-MCNC: 203 MG/DL — HIGH (ref 70–99)
GLUCOSE SERPL-MCNC: 97 MG/DL — SIGNIFICANT CHANGE UP (ref 70–99)
HCT VFR BLD CALC: 29.3 % — LOW (ref 39–50)
HGB BLD-MCNC: 9.8 G/DL — LOW (ref 13–17)
IANC: 1.17 K/UL — LOW (ref 1.8–7.4)
IMM GRANULOCYTES NFR BLD AUTO: 0.9 % — SIGNIFICANT CHANGE UP (ref 0–0.9)
LYMPHOCYTES # BLD AUTO: 0.85 K/UL — LOW (ref 1–3.3)
LYMPHOCYTES # BLD AUTO: 38.1 % — SIGNIFICANT CHANGE UP (ref 13–44)
MAGNESIUM SERPL-MCNC: 2.4 MG/DL — SIGNIFICANT CHANGE UP (ref 1.6–2.6)
MCHC RBC-ENTMCNC: 33.4 G/DL — SIGNIFICANT CHANGE UP (ref 32–36)
MCHC RBC-ENTMCNC: 35.4 PG — HIGH (ref 27–34)
MCV RBC AUTO: 105.8 FL — HIGH (ref 80–100)
MONOCYTES # BLD AUTO: 0.12 K/UL — SIGNIFICANT CHANGE UP (ref 0–0.9)
MONOCYTES NFR BLD AUTO: 5.4 % — SIGNIFICANT CHANGE UP (ref 2–14)
NEUTROPHILS # BLD AUTO: 1.17 K/UL — LOW (ref 1.8–7.4)
NEUTROPHILS NFR BLD AUTO: 52.5 % — SIGNIFICANT CHANGE UP (ref 43–77)
NRBC # BLD AUTO: 0.02 K/UL — HIGH (ref 0–0)
NRBC # FLD: 0.02 K/UL — HIGH (ref 0–0)
NRBC BLD AUTO-RTO: 0 /100 WBCS — SIGNIFICANT CHANGE UP (ref 0–0)
PHOSPHATE SERPL-MCNC: 5.7 MG/DL — HIGH (ref 2.5–4.5)
PLATELET # BLD AUTO: 113 K/UL — LOW (ref 150–400)
POTASSIUM SERPL-MCNC: 4.9 MMOL/L — SIGNIFICANT CHANGE UP (ref 3.5–5.3)
POTASSIUM SERPL-SCNC: 4.9 MMOL/L — SIGNIFICANT CHANGE UP (ref 3.5–5.3)
PROT SERPL-MCNC: 6.4 G/DL — SIGNIFICANT CHANGE UP (ref 6–8.3)
RBC # BLD: 2.77 M/UL — LOW (ref 4.2–5.8)
RBC # FLD: 24.7 % — HIGH (ref 10.3–14.5)
SODIUM SERPL-SCNC: 142 MMOL/L — SIGNIFICANT CHANGE UP (ref 135–145)
WBC # BLD: 2.23 K/UL — LOW (ref 3.8–10.5)
WBC # FLD AUTO: 2.23 K/UL — LOW (ref 3.8–10.5)

## 2025-06-11 PROCEDURE — 99233 SBSQ HOSP IP/OBS HIGH 50: CPT

## 2025-06-11 PROCEDURE — 99232 SBSQ HOSP IP/OBS MODERATE 35: CPT

## 2025-06-11 RX ADMIN — Medication 75 MILLIGRAM(S): at 13:11

## 2025-06-11 RX ADMIN — Medication 650 MILLIGRAM(S): at 22:35

## 2025-06-11 RX ADMIN — GABAPENTIN 300 MILLIGRAM(S): 400 CAPSULE ORAL at 08:27

## 2025-06-11 RX ADMIN — METOPROLOL SUCCINATE 12.5 MILLIGRAM(S): 50 TABLET, EXTENDED RELEASE ORAL at 08:29

## 2025-06-11 RX ADMIN — ISOSORBDIE DINITRATE 10 MILLIGRAM(S): 30 TABLET ORAL at 08:28

## 2025-06-11 RX ADMIN — INSULIN LISPRO 1: 100 INJECTION, SOLUTION INTRAVENOUS; SUBCUTANEOUS at 13:05

## 2025-06-11 RX ADMIN — CLOPIDOGREL BISULFATE 75 MILLIGRAM(S): 75 TABLET, FILM COATED ORAL at 11:46

## 2025-06-11 RX ADMIN — GABAPENTIN 300 MILLIGRAM(S): 400 CAPSULE ORAL at 17:29

## 2025-06-11 RX ADMIN — ISOSORBDIE DINITRATE 10 MILLIGRAM(S): 30 TABLET ORAL at 17:30

## 2025-06-11 RX ADMIN — INSULIN GLARGINE-YFGN 10 UNIT(S): 100 INJECTION, SOLUTION SUBCUTANEOUS at 22:29

## 2025-06-11 RX ADMIN — Medication 75 MILLIGRAM(S): at 22:35

## 2025-06-11 RX ADMIN — APIXABAN 2.5 MILLIGRAM(S): 2.5 TABLET, FILM COATED ORAL at 08:28

## 2025-06-11 RX ADMIN — INSULIN LISPRO 2: 100 INJECTION, SOLUTION INTRAVENOUS; SUBCUTANEOUS at 18:25

## 2025-06-11 RX ADMIN — ROSUVASTATIN CALCIUM 20 MILLIGRAM(S): 20 TABLET, FILM COATED ORAL at 22:32

## 2025-06-11 RX ADMIN — APIXABAN 2.5 MILLIGRAM(S): 2.5 TABLET, FILM COATED ORAL at 17:31

## 2025-06-11 RX ADMIN — Medication 667 MILLIGRAM(S): at 22:31

## 2025-06-11 RX ADMIN — Medication 667 MILLIGRAM(S): at 13:04

## 2025-06-11 RX ADMIN — POLYETHYLENE GLYCOL 3350 17 GRAM(S): 17 POWDER, FOR SOLUTION ORAL at 11:47

## 2025-06-11 RX ADMIN — Medication 40 MILLIGRAM(S): at 08:28

## 2025-06-11 RX ADMIN — Medication 1 APPLICATION(S): at 08:27

## 2025-06-11 RX ADMIN — Medication 75 MILLIGRAM(S): at 08:32

## 2025-06-11 RX ADMIN — Medication 2 TABLET(S): at 22:32

## 2025-06-11 RX ADMIN — Medication 200 MILLIGRAM(S): at 11:46

## 2025-06-11 RX ADMIN — Medication 650 MILLIGRAM(S): at 23:35

## 2025-06-11 NOTE — PROGRESS NOTE ADULT - SUBJECTIVE AND OBJECTIVE BOX
NEPHROLOGY     Patient seen and examined resting comfortably on 2L NC, no new complaints, in no acute distress.     MEDICATIONS  (STANDING):  allopurinol 200 milliGRAM(s) Oral daily  apixaban      apixaban 2.5 milliGRAM(s) Oral two times a day  chlorhexidine 2% Cloths 1 Application(s) Topical <User Schedule>  clopidogrel Tablet 75 milliGRAM(s) Oral daily  dextrose 5%. 1000 milliLiter(s) (50 mL/Hr) IV Continuous <Continuous>  dextrose 5%. 1000 milliLiter(s) (100 mL/Hr) IV Continuous <Continuous>  dextrose 50% Injectable 25 Gram(s) IV Push once  dextrose 50% Injectable 12.5 Gram(s) IV Push once  dextrose 50% Injectable 25 Gram(s) IV Push once  gabapentin 300 milliGRAM(s) Oral two times a day  glucagon  Injectable 1 milliGRAM(s) IntraMuscular once  hydrALAZINE 75 milliGRAM(s) Oral three times a day  insulin glargine Injectable (LANTUS) 10 Unit(s) SubCutaneous at bedtime  insulin lispro (ADMELOG) corrective regimen sliding scale   SubCutaneous three times a day before meals  insulin lispro (ADMELOG) corrective regimen sliding scale   SubCutaneous at bedtime  isosorbide   dinitrate Tablet (ISORDIL) 10 milliGRAM(s) Oral three times a day  levoFLOXacin  Tablet 250 milliGRAM(s) Oral every 24 hours  metoprolol succinate ER 12.5 milliGRAM(s) Oral daily  pantoprazole    Tablet 40 milliGRAM(s) Oral before breakfast  polyethylene glycol 3350 17 Gram(s) Oral daily  rosuvastatin 20 milliGRAM(s) Oral at bedtime  senna 2 Tablet(s) Oral at bedtime  sodium chloride 0.9%. 1000 milliLiter(s) (50 mL/Hr) IV Continuous <Continuous>    VITALS:  T(C): , Max: 36.8 (06-11-25 @ 05:47)  T(F): , Max: 98.2 (06-11-25 @ 05:47)  HR: 78 (06-11-25 @ 05:47)  BP: 119/60 (06-11-25 @ 05:47)  BP(mean): 74 (06-10-25 @ 22:30)  RR: 17 (06-11-25 @ 05:47)  SpO2: 97% (06-11-25 @ 05:47)    I and O's:    06-10 @ 07:01  -  06-11 @ 07:00  --------------------------------------------------------  IN: 1000 mL / OUT: 900 mL / NET: 100 mL    PHYSICAL EXAM:  Constitutional: NAD  HEENT: PERRLA, EOMI,  MMM  Neck: No LAD, No JVD  Respiratory: diminished   Cardiovascular: S1 and S2  Gastrointestinal: BS+, soft, NT/ND  Extremities: No peripheral edema  Neurological: A/O x 3, no focal deficits  Psychiatric: Normal mood, normal affect  : + Min  Skin: No rashes    LABS:                        9.8    2.23  )-----------( 113      ( 11 Jun 2025 05:44 )             29.3     06-11    142  |  101  |  75[H]  ----------------------------<  97  4.9   |  23  |  2.88[H]    Ca    7.4[L]      11 Jun 2025 05:44  Phos  5.7     06-11  Mg     2.40     06-11    TPro  6.4  /  Alb  3.7  /  TBili  0.8  /  DBili  x   /  AST  50[H]  /  ALT  14  /  AlkPhos  74  06-11    RADIOLOGY & ADDITIONAL STUDIES:  rad< from: Xray Chest 1 View- PORTABLE-Urgent (Xray Chest 1 View- PORTABLE-Urgent .) (06.10.25 @ 14:01) >  INTERPRETATION:  CLINICAL INFORMATION: Heart failure    TIME OF EXAMINATION: Irene 10 at 1:20 PM    EXAM: Portable chest    FINDINGS:    Left-sided PICC line with tip in the SVC unchanged.  Moderate sized pleural effusions unchanged from the last study.  No focal consolidations.  Heart size is stable.  No pneumothorax.      COMPARISON: June 6 with similar findings      IMPRESSION: Bilateral pleural effusions unchanged.    --- End of Report ---      MOUNA BLANCO MD; Attending Radiologist  This document has been electronically signed. Koby 10 2025  2:46PM    < end of copied text >      ASSESSMENT/PLAN:   83 Y/O M PMH MDS, HTN, CKD 3, A Fib, Anemia, HTN, Prostate CA, gout, obesity, presents with 3 days of orthopnea, increasing shortness of breath and weight gain  CHF   CKD stage 4   Anemia, thrombocytopenia, leukopenia - Pancytopenia   Severe AS   Hyperphosphatemia      1 Renal- creatinine stable, now on po bumex  Trend serum creatinine and strict ins and outs   Encourage po intake   2 CVS- BP stable, s/p nitro gtt, now on Toprol 12.5 mg po daily, Hydralazine 75mg po tid and Isordil 10 mg po tid   Right and left heart cath timing TBD- no renal objection (creatinine noted)   TTE revealed severe AS; Plan for TAVR evaluation this admission  3 Pulm- now off bipap and on nasal cannula   May need CT of the chest to assess the effusions and see if he would benefit from possible thoracentesis   4 Heme - Hgb stable, Serial cbc NEPHROLOGY     Patient seen and examined resting comfortably on 2L NC, no new complaints, in no acute distress.     MEDICATIONS  (STANDING):  allopurinol 200 milliGRAM(s) Oral daily  apixaban      apixaban 2.5 milliGRAM(s) Oral two times a day  chlorhexidine 2% Cloths 1 Application(s) Topical <User Schedule>  clopidogrel Tablet 75 milliGRAM(s) Oral daily  dextrose 5%. 1000 milliLiter(s) (50 mL/Hr) IV Continuous <Continuous>  dextrose 5%. 1000 milliLiter(s) (100 mL/Hr) IV Continuous <Continuous>  dextrose 50% Injectable 25 Gram(s) IV Push once  dextrose 50% Injectable 12.5 Gram(s) IV Push once  dextrose 50% Injectable 25 Gram(s) IV Push once  gabapentin 300 milliGRAM(s) Oral two times a day  glucagon  Injectable 1 milliGRAM(s) IntraMuscular once  hydrALAZINE 75 milliGRAM(s) Oral three times a day  insulin glargine Injectable (LANTUS) 10 Unit(s) SubCutaneous at bedtime  insulin lispro (ADMELOG) corrective regimen sliding scale   SubCutaneous three times a day before meals  insulin lispro (ADMELOG) corrective regimen sliding scale   SubCutaneous at bedtime  isosorbide   dinitrate Tablet (ISORDIL) 10 milliGRAM(s) Oral three times a day  levoFLOXacin  Tablet 250 milliGRAM(s) Oral every 24 hours  metoprolol succinate ER 12.5 milliGRAM(s) Oral daily  pantoprazole    Tablet 40 milliGRAM(s) Oral before breakfast  polyethylene glycol 3350 17 Gram(s) Oral daily  rosuvastatin 20 milliGRAM(s) Oral at bedtime  senna 2 Tablet(s) Oral at bedtime  sodium chloride 0.9%. 1000 milliLiter(s) (50 mL/Hr) IV Continuous <Continuous>    VITALS:  T(C): , Max: 36.8 (06-11-25 @ 05:47)  T(F): , Max: 98.2 (06-11-25 @ 05:47)  HR: 78 (06-11-25 @ 05:47)  BP: 119/60 (06-11-25 @ 05:47)  BP(mean): 74 (06-10-25 @ 22:30)  RR: 17 (06-11-25 @ 05:47)  SpO2: 97% (06-11-25 @ 05:47)    I and O's:    06-10 @ 07:01  -  06-11 @ 07:00  --------------------------------------------------------  IN: 1000 mL / OUT: 900 mL / NET: 100 mL    PHYSICAL EXAM:  Constitutional: NAD  HEENT: PERRLA, EOMI,  MMM  Neck: No LAD, No JVD  Respiratory: diminished   Cardiovascular: S1 and S2  Gastrointestinal: BS+, soft, NT/ND  Extremities: No peripheral edema  Neurological: A/O x 3, no focal deficits  Psychiatric: Normal mood, normal affect  : + Min  Skin: No rashes    LABS:                        9.8    2.23  )-----------( 113      ( 11 Jun 2025 05:44 )             29.3     06-11    142  |  101  |  75[H]  ----------------------------<  97  4.9   |  23  |  2.88[H]    Ca    7.4[L]      11 Jun 2025 05:44  Phos  5.7     06-11  Mg     2.40     06-11    TPro  6.4  /  Alb  3.7  /  TBili  0.8  /  DBili  x   /  AST  50[H]  /  ALT  14  /  AlkPhos  74  06-11    RADIOLOGY & ADDITIONAL STUDIES:  rad< from: Xray Chest 1 View- PORTABLE-Urgent (Xray Chest 1 View- PORTABLE-Urgent .) (06.10.25 @ 14:01) >  INTERPRETATION:  CLINICAL INFORMATION: Heart failure    TIME OF EXAMINATION: Irene 10 at 1:20 PM    EXAM: Portable chest    FINDINGS:    Left-sided PICC line with tip in the SVC unchanged.  Moderate sized pleural effusions unchanged from the last study.  No focal consolidations.  Heart size is stable.  No pneumothorax.      COMPARISON: June 6 with similar findings      IMPRESSION: Bilateral pleural effusions unchanged.    --- End of Report ---      MOUNA BLANCO MD; Attending Radiologist  This document has been electronically signed. Koby 10 2025  2:46PM    < end of copied text >      ASSESSMENT/PLAN:   85 Y/O M PMH MDS, HTN, CKD 3, A Fib, Anemia, HTN, Prostate CA, gout, obesity, presents with 3 days of orthopnea, increasing shortness of breath and weight gain  CHF   CKD stage 4   Anemia, thrombocytopenia, leukopenia - Pancytopenia   Severe AS   Hyperphosphatemia    Hypocalcemia     1 Renal- creatinine stable, now on po bumex  Trend serum creatinine and strict ins and outs   Encourage po intake   Start calcium acetate 667 po tid   2 CVS- BP stable, s/p nitro gtt, now on Toprol 12.5 mg po daily, Hydralazine 75mg po tid and Isordil 10 mg po tid   Right and left heart cath timing TBD- no renal objection (creatinine noted)   TTE revealed severe AS; Plan for TAVR evaluation this admission  3 Pulm- now off bipap and on nasal cannula   May need CT of the chest to assess the effusions and see if he would benefit from possible thoracentesis   4 Heme - Hgb stable, Serial cbc NEPHROLOGY     Patient seen and examined resting comfortably on 2L NC, no new complaints, in no acute distress.     MEDICATIONS  (STANDING):  allopurinol 200 milliGRAM(s) Oral daily  apixaban      apixaban 2.5 milliGRAM(s) Oral two times a day  chlorhexidine 2% Cloths 1 Application(s) Topical <User Schedule>  clopidogrel Tablet 75 milliGRAM(s) Oral daily  dextrose 5%. 1000 milliLiter(s) (50 mL/Hr) IV Continuous <Continuous>  dextrose 5%. 1000 milliLiter(s) (100 mL/Hr) IV Continuous <Continuous>.  dextrose 50% Injectable 25 Gram(s) IV Push once  dextrose 50% Injectable 12.5 Gram(s) IV Push once  dextrose 50% Injectable 25 Gram(s) IV Push once  gabapentin 300 milliGRAM(s) Oral two times a day  glucagon  Injectable 1 milliGRAM(s) IntraMuscular once  hydrALAZINE 75 milliGRAM(s) Oral three times a day  insulin glargine Injectable (LANTUS) 10 Unit(s) SubCutaneous at bedtime  insulin lispro (ADMELOG) corrective regimen sliding scale   SubCutaneous three times a day before meals  insulin lispro (ADMELOG) corrective regimen sliding scale   SubCutaneous at bedtime  isosorbide   dinitrate Tablet (ISORDIL) 10 milliGRAM(s) Oral three times a day  levoFLOXacin  Tablet 250 milliGRAM(s) Oral every 24 hours  metoprolol succinate ER 12.5 milliGRAM(s) Oral daily  pantoprazole    Tablet 40 milliGRAM(s) Oral before breakfast  polyethylene glycol 3350 17 Gram(s) Oral daily  rosuvastatin 20 milliGRAM(s) Oral at bedtime  senna 2 Tablet(s) Oral at bedtime  sodium chloride 0.9%. 1000 milliLiter(s) (50 mL/Hr) IV Continuous <Continuous>    VITALS:  T(C): , Max: 36.8 (06-11-25 @ 05:47)  T(F): , Max: 98.2 (06-11-25 @ 05:47)  HR: 78 (06-11-25 @ 05:47)  BP: 119/60 (06-11-25 @ 05:47)  BP(mean): 74 (06-10-25 @ 22:30)  RR: 17 (06-11-25 @ 05:47)  SpO2: 97% (06-11-25 @ 05:47)    I and O's:    06-10 @ 07:01  -  06-11 @ 07:00  --------------------------------------------------------  IN: 1000 mL / OUT: 900 mL / NET: 100 mL.    PHYSICAL EXAM:  Constitutional: NAD  HEENT: PERRLA, EOMI,  MMM  Neck: No LAD, No JVD  Respiratory: diminished   Cardiovascular: S1 and S2  Gastrointestinal: BS+, soft, NT/ND  Extremities: No peripheral edema  Neurological: A/O x 3, no focal deficits  Psychiatric: Normal mood, normal affect  : + Min  Skin: No rashes    LABS:                        9.8    2.23  )-----------( 113      ( 11 Jun 2025 05:44 )             29.3     06-11    142  |  101  |  75[H]  ----------------------------<  97  4.9   |  23  |  2.88[H]    Ca    7.4[L]      11 Jun 2025 05:44  Phos  5.7     06-11  Mg     2.40     06-11    TPro  6.4  /  Alb  3.7  /  TBili  0.8  /  DBili  x   /  AST  50[H]  /  ALT  14  /  AlkPhos  74  06-11    RADIOLOGY & ADDITIONAL STUDIES:  rad< from: Xray Chest 1 View- PORTABLE-Urgent (Xray Chest 1 View- PORTABLE-Urgent .) (06.10.25 @ 14:01) >  INTERPRETATION:  CLINICAL INFORMATION: Heart failure    TIME OF EXAMINATION: Irene 10 at 1:20 PM    EXAM: Portable chest    FINDINGS:    Left-sided PICC line with tip in the SVC unchanged.  Moderate sized pleural effusions unchanged from the last study.  No focal consolidations.  Heart size is stable.  No pneumothorax.      COMPARISON: June 6 with similar findings      IMPRESSION: Bilateral pleural effusions unchanged.    --- End of Report ---      MOUNA BLANCO MD; Attending Radiologist  This document has been electronically signed. Koby 10 2025  2:46PM    < end of copied text >

## 2025-06-11 NOTE — PROGRESS NOTE ADULT - ASSESSMENT
85 y/o male, PMHx of CAD ([facility] 3/18/21 mild LAD 60%, OM1 60%, Prox  ), HFrEF (JEAN 12/26/24 LVEF 35%, trace TR, MR), AFIB (on Eliquis), Renal Artery Stenosis, s/p left renal stent, HTN, Anemia, Prostate Cancer, MDS currently on chemotherapy via PICC line, last dose was in mid April. admitted for decompensated on chronic HFrEF and admitted to CCU for further management.  Heart Failure team consulted, Overall stage C HF, NYHA class IV-severely hypervolemic and hypoxic requiring BIPAP.  Subsequent repeat TTE done 6/4/25 showing significant aortic stenosis, possible severe. Interventional cardiology consulted for TAVR evaluation.  Stepped down from CCU to regional floor for further management    # Aortic Stenosis  # Acute systolic heart failure     - Euvolemic on exam, SpO2 96-97, O2, 2L NC, tolerating well  - TTE 6/4:  EF 40-45%, RWMA present.  Hypokinesis of the inferior and inferolateral walls.  Moderate MR. pVel 3.51, pGrad 49.3, mGrad 26.0, LVOT/aortic valve VTI ratio of 0.25.  The gross appearance of the aortic valve is consistent with significant aortic stenosis. However, unable to accurately estimate the aortic valve area.  Mild to moderate AR.   - Interventional cards consulted.  Discussed with patient, details and indication for further TAVR evaluation with Right/Left Heart Cath, JEAN and CTA TAVR, to which he is amendable   - Nitro gtt d/c'ed 6/5  - HF following, recs appreciated.  IV/PO Bumex d/ricardo.  Continue Hydralazine 75mg TID and Isordil 10mg TID (hold SBP < 100)  - monitor electrolytes, replete to keep K > 4, Mg > 2  - Strict I/Os. Daily weights, fluid restriction  - case discussed with interventionalist, Dr Mendoza, deemed patient is not a TAVR candidate, recommending medical therapy at this time given patient is high risk for bleeding and increased risk for complications.  No further TAVR work-up at this time    # CAD  # USHA  - s/p Dx [facility]  2021:  LM normal, LAD 60%, OM1 60%, %   - s/p L Renal stent on DAPT  - continue Rosuvastatin 20mg daily at bedtime    # JUAN RAMON on CKD  - CKD stage 3, baseline Cr ~ 2  - Nephro following, recs appreciated  - Cr stable 2.88 today.  Trend BUN/Cr daily. Avoid nephrotoxic agents  - h/o Renal artery stent, on DAPT  - Further plan per Nephro    # Paroxysmal Atrial Fibrillation   - NSR, HR stable 70s  - EP consulted, recs appreciated  - continue Eliquis 2.5mg BID (renally dosed)  - continue Metoprolol Succ 12.5mg daily    # Anemia  # Myelodysplastic Syndrome (MDS)  - History of MDS, last chemo dose 5/20/25   - Follows with outpatient HemeOnc  - H/H and Plts stable   - On Levofloxacin for neutropenia  - Further plan per primary team       83 y/o male, PMHx of CAD ([facility] 3/18/21 mild LAD 60%, OM1 60%, Prox  ), HFrEF (JEAN 12/26/24 LVEF 35%, trace TR, MR), AFIB (on Eliquis), Renal Artery Stenosis, s/p left renal stent, HTN, Anemia, Prostate Cancer, MDS currently on chemotherapy via PICC line, last dose was in mid April. admitted for decompensated on chronic HFrEF and admitted to CCU for further management.  Heart Failure team consulted, Overall stage C HF, NYHA class IV-severely hypervolemic and hypoxic requiring BIPAP.  Subsequent repeat TTE done 6/4/25 showing significant aortic stenosis, possible severe. Interventional cardiology consulted for TAVR evaluation.  Stepped down from CCU to regional floor for further management    # Aortic Stenosis  # Acute systolic heart failure     - Euvolemic on exam, SpO2 96-97%, O2, 2L NC, tolerating well  - TTE 6/4:  EF 40-45%, RWMA present.  Hypokinesis of the inferior and inferolateral walls.  Moderate MR. pVel 3.51, pGrad 49.3, mGrad 26.0, LVOT/aortic valve VTI ratio of 0.25.  The gross appearance of the aortic valve is consistent with significant aortic stenosis. However, unable to accurately estimate the aortic valve area.  Mild to moderate AR.   - Interventional cards consulted.  Discussed with patient, details and indication for further TAVR evaluation with Right/Left Heart Cath, JEAN and CTA TAVR, to which he is amendable   - Nitro gtt d/c'ed 6/5  - HF following, recs appreciated.  IV/PO Bumex d/ricardo.  Continue Hydralazine 75mg TID and Isordil 10mg TID (hold SBP < 100)  - monitor electrolytes, replete to keep K > 4, Mg > 2  - Strict I/Os. Daily weights, fluid restriction  - case discussed with interventionalist, Dr Mendoza, deemed patient is not a TAVR candidate, recommending medical therapy at this time given patient is high risk for bleeding and increased risk for complications.  No further TAVR work-up at this time    # CAD  # USHA  - s/p Dx [facility]  2021:  LM normal, LAD 60%, OM1 60%, %   - s/p L Renal stent on DAPT  - continue Rosuvastatin 20mg daily at bedtime    # JUAN RAMON on CKD  - CKD stage 3, baseline Cr ~ 2  - Nephro following, recs appreciated  - Cr stable 2.88 today.  Trend BUN/Cr daily. Avoid nephrotoxic agents  - h/o Renal artery stent, on DAPT  - Further plan per Nephro    # Paroxysmal Atrial Fibrillation   - NSR, HR stable 70s  - EP consulted, recs appreciated  - continue Eliquis 2.5mg BID (renally dosed)  - continue Metoprolol Succ 12.5mg daily    # Anemia  # Myelodysplastic Syndrome (MDS)  - History of MDS, last chemo dose 5/20/25   - Follows with outpatient HemeOnc  - H/H and Plts stable   - On Levofloxacin for neutropenia  - Further plan per primary team

## 2025-06-11 NOTE — PROGRESS NOTE ADULT - ASSESSMENT
85 Y/O M PMH MDS, HTN, CKD 3, A Fib, Anemia, HTN, Prostate CA, gout, obesity, presents with 3 days of orthopnea, increasing shortness of breath and weight gain  CHF   CKD stage 4   Anemia, thrombocytopenia, leukopenia - Pancytopenia   Severe AS   Hyperphosphatemia    Hypocalcemia     1 Renal- creatinine stable, now on po bumex  Trend serum creatinine and strict ins and outs   Encourage po intake   Start calcium acetate 667 po tid   2 CVS- BP stable, s/p nitro gtt, now on Toprol 12.5 mg po daily, Hydralazine 75mg po tid and Isordil 10 mg po tid   Right and left heart cath timing TBD- no renal objection (creatinine noted)   TTE revealed severe AS; Plan for TAVR evaluation this admission  3 Pulm- now off bipap and on nasal cannula   May need CT of the chest to assess the effusions and see if he would benefit from possible thoracentesis   4 Heme - Hgb stable, Serial cbc    DW Heart failure - Again no renal objections to proceed     Sayed [facility]   [phone]

## 2025-06-11 NOTE — PROGRESS NOTE ADULT - SUBJECTIVE AND OBJECTIVE BOX
Patient is a 84y old  Male who presents with a chief complaint of Fluid overload (11 Jun 2025 12:42)      INTERVAL HPI/OVERNIGHT EVENTS:  Seen by me this morning, sitting in chair, doing well, aide at bedside, remains on 2L NC, no SOB or chest pain.    Review of Systems: 12 point review of systems otherwise negative    MEDICATIONS  (STANDING):  allopurinol 200 milliGRAM(s) Oral daily  apixaban      apixaban 2.5 milliGRAM(s) Oral two times a day  calcium acetate 667 milliGRAM(s) Oral three times a day with meals  chlorhexidine 2% Cloths 1 Application(s) Topical <User Schedule>  clopidogrel Tablet 75 milliGRAM(s) Oral daily  dextrose 5%. 1000 milliLiter(s) (100 mL/Hr) IV Continuous <Continuous>  dextrose 5%. 1000 milliLiter(s) (50 mL/Hr) IV Continuous <Continuous>  dextrose 50% Injectable 25 Gram(s) IV Push once  dextrose 50% Injectable 12.5 Gram(s) IV Push once  dextrose 50% Injectable 25 Gram(s) IV Push once  gabapentin 300 milliGRAM(s) Oral two times a day  glucagon  Injectable 1 milliGRAM(s) IntraMuscular once  hydrALAZINE 75 milliGRAM(s) Oral three times a day  insulin glargine Injectable (LANTUS) 10 Unit(s) SubCutaneous at bedtime  insulin lispro (ADMELOG) corrective regimen sliding scale   SubCutaneous three times a day before meals  insulin lispro (ADMELOG) corrective regimen sliding scale   SubCutaneous at bedtime  isosorbide   dinitrate Tablet (ISORDIL) 10 milliGRAM(s) Oral three times a day  levoFLOXacin  Tablet 250 milliGRAM(s) Oral every 24 hours  metoprolol succinate ER 12.5 milliGRAM(s) Oral daily  pantoprazole    Tablet 40 milliGRAM(s) Oral before breakfast  polyethylene glycol 3350 17 Gram(s) Oral daily  rosuvastatin 20 milliGRAM(s) Oral at bedtime  senna 2 Tablet(s) Oral at bedtime  sodium chloride 0.9%. 1000 milliLiter(s) (50 mL/Hr) IV Continuous <Continuous>    MEDICATIONS  (PRN):  acetaminophen     Tablet .. 650 milliGRAM(s) Oral every 6 hours PRN Moderate Pain (4 - 6)  dextrose Oral Gel 15 Gram(s) Oral once PRN Blood Glucose LESS THAN 70 milliGRAM(s)/deciliter  melatonin 3 milliGRAM(s) Oral at bedtime PRN Insomnia      Allergies    No Known Allergies    Intolerances          Vital Signs Last 24 Hrs  T(C): 36.6 (11 Jun 2025 20:29), Max: 37.1 (11 Jun 2025 13:04)  T(F): 97.9 (11 Jun 2025 20:29), Max: 98.7 (11 Jun 2025 13:04)  HR: 81 (11 Jun 2025 20:29) (70 - 89)  BP: 109/56 (11 Jun 2025 20:29) (108/56 - 122/82)  BP(mean): 74 (10 Koby 2025 22:30) (74 - 74)  RR: 18 (11 Jun 2025 20:29) (17 - 18)  SpO2: 98% (11 Jun 2025 20:29) (94% - 99%)    Parameters below as of 11 Jun 2025 20:29  Patient On (Oxygen Delivery Method): nasal cannula  O2 Flow (L/min): 2    CAPILLARY BLOOD GLUCOSE      POCT Blood Glucose.: 203 mg/dL (11 Jun 2025 17:35)  POCT Blood Glucose.: 164 mg/dL (11 Jun 2025 12:15)  POCT Blood Glucose.: 113 mg/dL (11 Jun 2025 08:25)  POCT Blood Glucose.: 145 mg/dL (10 Koby 2025 22:31)      06-10 @ 07:01 - 06-11 @ 07:00  --------------------------------------------------------  IN: 1000 mL / OUT: 900 mL / NET: 100 mL    06-11 @ 07:01 - 06-11 @ 21:14  --------------------------------------------------------  IN: 120 mL / OUT: 275 mL / NET: -155 mL        Physical Exam:    Daily     Daily   General:  Well appearing, NAD, not cachetic  HEENT:  Nonicteric, PERRLA  CV:  S1S2 Ok  Lungs:  CTA B/L, no wheezes, rales, rhonchi  Abdomen:  Soft, non-tender, no distended, positive BS  Extremities:  2+ pulses, no c/c, no edema  Skin:  Warm and dry, no rashes  :  No sandhu  Neuro:  AAOx3, non-focal, CN II-XII grossly intact  No Restraints    LABS:                        9.8    2.23  )-----------( 113      ( 11 Jun 2025 05:44 )             29.3     06-11    142  |  101  |  75[H]  ----------------------------<  97  4.9   |  23  |  2.88[H]    Ca    7.4[L]      11 Jun 2025 05:44  Phos  5.7     06-11  Mg     2.40     06-11    TPro  6.4  /  Alb  3.7  /  TBili  0.8  /  DBili  x   /  AST  50[H]  /  ALT  14  /  AlkPhos  74  06-11      Urinalysis Basic - ( 11 Jun 2025 05:44 )    Color: x / Appearance: x / SG: x / pH: x  Gluc: 97 mg/dL / Ketone: x  / Bili: x / Urobili: x   Blood: x / Protein: x / Nitrite: x   Leuk Esterase: x / RBC: x / WBC x   Sq Epi: x / Non Sq Epi: x / Bacteria: x          RADIOLOGY & ADDITIONAL TESTS:  Reviewed by me

## 2025-06-11 NOTE — PROGRESS NOTE ADULT - ASSESSMENT
84-year-old male with a past medical history of myelodysplastic syndrome (MDS), hypertension (HTN), chronic kidney disease stage 3 (CKD 3), atrial fibrillation (A Fib), anemia, prostate cancer, gout, and obesity, presented with acute hypoxic respiratory failure requiring BiPAP secondary to an acute exacerbation of heart failure with reduced ejection fraction (HFrEF). He was transferred to the coronary care unit (CCU) and diuresed. He is now stabilized and has been transferred back to the medical floor.    Active Problems:    Myelodysplastic Syndrome (MDS)  Acute on chronic heart failure with reduced ejection fraction (HFrEF)  Acute hypoxic respiratory failure  Severe Aortic Stenosis  Chronic kidney disease stage 3 (CKD 3)  Atrial fibrillation with rapid ventricular response (A Fib with RVR)/pAFib  Hypercoagulable state  Anemia  Hypertension (HTN)  Prostate cancer  Gout  Steroid induced hyperglycemia  Obesity  Immunosuppressed status    MDS: History of MDS;   Last chemotherapy on May 20th. 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 Per outpatient chart, anemia is also multifactorial (anemia of chronic disease/anemia of renal failure). Patient takes levofloxacin for neutropenia. Continue outpatient follow-up.    Acute on Chronic HFrEF/Acute Hypoxic Respiratory Failure  TTE revealed severe aortic stenosis (AS), repeat limited TTE 6/9 w/ normal RA pressure  C/w Hydralazine 75mg every 8 hours, C/w Isosorbide dinitrate (ISDN) 10mg every 8 hours  Apprec HF recs, holding bumex at this time, s/p IVF's 6/10 due to low JVP/hypovolemia  C/w Toprol 12.5 mg po qd  CXR 6/10: Bilateral pleural effusions unchanged  Per Interventional Cards, case was d/w with interventionalist, Dr Mendoza, deemed patient is not a TAVR candidate, recommending medical therapy at this time given patient is high risk for bleeding and increased risk for complications  - No further TAVR work-up at this time  - Continue clopidogrel (Plavix), statin. Strict intake and output monitoring. Daily standing weights. Monitor electrolytes; replete potassium to > 4.0 mEq/L and magnesium to > 2.0 mg/dL  - On 2L NC, continue to wean off O2 as tolerated, will need Desaturation study prior to discharge  - TOV tonight    Steroid induced hyperglycemia  FS much improved  C/w lantus 10U qhs  C/w SAMIR for now    Acute on chronic CKD 3  Nephrology consulted. Recommendations appreciated  Start calcium acetate 667 po tid   No renal objection for LHC/RHC  Cr seems to be stable around 2.8, off bumex at this time  Monitor BMP    Hypercoagulable State/A Fib with RVR, pAFib  On apixaban (Eliquis)  Now on NSR    Anemia  Monitor as above (under MDS)    Hypertension  Continue medications as above for heart failure    PT eval --> NERI but pt wants to go back home with continued 24/7 care  Discussed with Son Vance at bedside on 6/10. Anticipate DC Home in 1-2 days as no further plans for TAVR work-up.

## 2025-06-11 NOTE — PROGRESS NOTE ADULT - SUBJECTIVE AND OBJECTIVE BOX
Patient seen and examined at bedside, sitting up in chair. HDS, on supplemental O2, tolerating well.  Pt currently denies CP, SOB, palpitations, diaphoresis, dizziness, Syncope, LE swelling, acute bleed or any other complaints at this time    - No events on telemetry overnight, remains in sinus rhythm      PERTINENT REVIEW OF SYSTEMS:  Constitutional:     [ ] negative [ ] fevers [ ] chills [ ] weight loss [ ] weight gain  CV:                         [ ] negative  [ ] chest pain [ ] orthopnea [ ] palpitations [ ] murmur  Resp:                     [ ] negative [ ] cough [ ] shortness of breath [ ] dyspnea [ ] wheezing [ ] sputum [ ]hemoptysis  GI:                          [ ] negative [ ] nausea [ ] vomiting [ ] diarrhea [ ] constipation [ ] abd pain [ ] dysphagia   Skin:                       [ ] negative [ ] rash [ ] itch  Neurological:        [ ] negative [ ] headache [ ] dizziness [ ] syncope [ ] weakness [ ] numbness  Psychiatric:           [ ] negative [ ] anxiety [ ] depression  Endocrine:            [ ] negative [ ] diabetes [ ] thyroid problem  Heme/Lymph:      [ ] negative [ ] anemia [ ] bleeding problem  Allergic/Immune: [ ] negative [ ] itchy eyes [ ] nasal discharge [ ] hives [ ] angioedema    [x] All other systems negative  [ ] Unable to assess ROS due to    Home Medications  Home Medications:  allopurinol 100 mg oral tablet: 2 tab(s) orally once a day (03 Jun 2025 01:31)  amLODIPine 5 mg oral tablet: 1 tab(s) orally once a day (03 Jun 2025 01:31)  aspirin 81 mg oral capsule: 1 cap(s) orally once a day (03 Jun 2025 01:31)  gabapentin 300 mg oral capsule: 1 cap(s) orally 3 times a day (04 Jun 2025 15:46)  levoFLOXacin 500 mg oral tablet: 1 tab(s) orally once a day (04 Jun 2025 15:47)  omeprazole 20 mg oral delayed release tablet: 1 tab(s) orally once a day (03 Jun 2025 01:31)  Plavix 75 mg oral tablet: 1 tab(s) orally once a day (03 Jun 2025 01:31)  rosuvastatin 10 mg oral tablet: 1 tab(s) orally once a day (03 Jun 2025 01:31)    Current Meds:  acetaminophen     Tablet .. 650 milliGRAM(s) Oral every 6 hours PRN  allopurinol 200 milliGRAM(s) Oral daily  apixaban      apixaban 2.5 milliGRAM(s) Oral two times a day  calcium acetate 667 milliGRAM(s) Oral three times a day with meals  chlorhexidine 2% Cloths 1 Application(s) Topical <User Schedule>  clopidogrel Tablet 75 milliGRAM(s) Oral daily  dextrose 5%. 1000 milliLiter(s) IV Continuous <Continuous>  dextrose 5%. 1000 milliLiter(s) IV Continuous <Continuous>  dextrose 50% Injectable 25 Gram(s) IV Push once  dextrose 50% Injectable 12.5 Gram(s) IV Push once  dextrose 50% Injectable 25 Gram(s) IV Push once  dextrose Oral Gel 15 Gram(s) Oral once PRN  gabapentin 300 milliGRAM(s) Oral two times a day  glucagon  Injectable 1 milliGRAM(s) IntraMuscular once  hydrALAZINE 75 milliGRAM(s) Oral three times a day  insulin glargine Injectable (LANTUS) 10 Unit(s) SubCutaneous at bedtime  insulin lispro (ADMELOG) corrective regimen sliding scale   SubCutaneous three times a day before meals  insulin lispro (ADMELOG) corrective regimen sliding scale   SubCutaneous at bedtime  isosorbide   dinitrate Tablet (ISORDIL) 10 milliGRAM(s) Oral three times a day  levoFLOXacin  Tablet 250 milliGRAM(s) Oral every 24 hours  melatonin 3 milliGRAM(s) Oral at bedtime PRN  metoprolol succinate ER 12.5 milliGRAM(s) Oral daily  pantoprazole    Tablet 40 milliGRAM(s) Oral before breakfast  polyethylene glycol 3350 17 Gram(s) Oral daily  rosuvastatin 20 milliGRAM(s) Oral at bedtime  senna 2 Tablet(s) Oral at bedtime  sodium chloride 0.9%. 1000 milliLiter(s) IV Continuous <Continuous>    PAST MEDICAL & SURGICAL HISTORY:  HTN - Hypertension    Hypercholesterolemia    PC (prostate cancer)  s/p surgical resection 3 years ago    Gout      Aneurysm, ascending aorta      H/O prostatectomy      H/O carotid endarterectomy      H/O renal artery stenosis  s/p stent in Left RA      Status post cataract extraction, unspecified laterality    Vitals:  T(F): 97.3 (06-11), Max: 98.2 (06-11)  HR: 78 (06-11) (70 - 89)  BP: 108/56 (06-11) (97/57 - 126/87)  RR: 18 (06-11)  SpO2: 97% (06-11)  I&O's Summary    10 Koby 2025 07:01  -  11 Jun 2025 07:00  --------------------------------------------------------  IN: 1000 mL / OUT: 900 mL / NET: 100 mL    11 Jun 2025 07:01  -  11 Jun 2025 12:42  --------------------------------------------------------  IN: 120 mL / OUT: 275 mL / NET: -155 mL    Physical Exam:  Appearance: No acute distress; well appearing  Neck: Supple, no JVD B/L, no Carotid Bruit B/L  Cardiovascular: + LESA, RRR, S1, S2  Respiratory: Clear to auscultation bilaterally, no RRW B/L  Gastrointestinal: soft, non-tender, non-distended with normal bowel sounds  Neurologic: No focal or neuro deficits noted  Psychiatry: AAOx3, mood & affect appropriate  Extremities:   No LE swelling bilaterally, palpable pulses throughout                        9.8    2.23  )-----------( 113      ( 11 Jun 2025 05:44 )             29.3     06-11    142  |  101  |  75[H]  ----------------------------<  97  4.9   |  23  |  2.88[H]    Ca    7.4[L]      11 Jun 2025 05:44  Phos  5.7     06-11  Mg     2.40     06-11    TPro  6.4  /  Alb  3.7  /  TBili  0.8  /  DBili  x   /  AST  50[H]  /  ALT  14  /  AlkPhos  74  06-11    Cardiac Imaging  ---------------------  < from: TTE W or WO Ultrasound Enhancing Agent (06.04.25 @ 06:31) >    CONCLUSIONS:      1. The left ventricular cavity is normal in size. Left ventricular wall thickness is normal. Left ventricular systolic function is mildly to moderately decreased with an ejection fraction visually estimated at 40 to 45%. There are regional wall motion abnormalities present. Hypokinesis of the inferior and inferolateral walls.   2. Normal right ventricular cavity size and probably normal right ventricular systolic function. Tricuspid annular plane systolic excursion (TAPSE) is 2.0 cm (normal >=1.7 cm).   3. Structurally normal mitral valve with normal leaflet excursion. There is calcification of the mitral valve annulus. There is mild leaflet calcification. There is mild to moderatemitral regurgitation.   4. The aortic valve anatomy cannot be determined. There is calcification of the aortic valve leaflets. The peak transaortic velocity is 3.51 m/s, peak transaortic gradient is 49.3 mmHg and mean transaortic gradient is 26.0 mmHg with an LVOT/aortic valve VTI ratio of 0.25. The gross appearance of the aortic valve is consistent with significant aortic stenosis. However, unable to accurately estimate the aortic valve area. There is mild aortic regurgitation.   5. The left atrium is moderately dilated with an indexed volume of 45.18 ml/m².   6. The tricuspid valve is structurally normal with normal leaflet excursion. There is mild tricuspid regurgitation. Estimated pulmonary artery systolic pressure is 42 mmHg, consistent with mild pulmonary hypertension.   7. Left pleural effusion noted.   8. No prior echocardiogram is available for comparison.    ____________________________________________________________________  Recommendations:  Consider JEAN for further evaluation of the aortic valve, if clinically indicated.     ==========================================================    < from: TTE Limited W or WO Ultrasound Enhancing Agent (12.23.24 @ 09:44) >     CONCLUSIONS:      1. No echocardiographic evidence of vegetations.   2. Moderate mitral regurgitation.   3. Mild aortic regurgitation.   4. Trace pulmonic regurgitation.   5. Trace tricuspid regurgitation.   6. Trileaflet aortic valve with reduced systolic excursion. There is calcification of the aortic valve leaflets.   7. Compared to the transthoracic echocardiogram performed on 6/11/2024, there have been no significant interval changes.    =================================================================    < from: TTE Limited W or WO Ultrasound Enhancing Agent (06.09.25 @ 16:38) >     CONCLUSIONS:      1. The inferior vena cava is normal in size measuring 1.73 cm in diameter, (normal <2.1cm) with normal inspiratory collapse (normal >50%) consistent with normal right atrial pressure (~3, range 0-5mmHg).    ________________________________________________________________________________________    Telemetry:  Sinus Rhythm

## 2025-06-12 LAB
ALBUMIN SERPL ELPH-MCNC: 3.4 G/DL — SIGNIFICANT CHANGE UP (ref 3.3–5)
ALP SERPL-CCNC: 74 U/L — SIGNIFICANT CHANGE UP (ref 40–120)
ALT FLD-CCNC: 9 U/L — SIGNIFICANT CHANGE UP (ref 4–41)
ANION GAP SERPL CALC-SCNC: 16 MMOL/L — HIGH (ref 7–14)
AST SERPL-CCNC: 16 U/L — SIGNIFICANT CHANGE UP (ref 4–40)
BASOPHILS # BLD AUTO: 0 K/UL — SIGNIFICANT CHANGE UP (ref 0–0.2)
BASOPHILS NFR BLD AUTO: 0 % — SIGNIFICANT CHANGE UP (ref 0–2)
BILIRUB SERPL-MCNC: 0.8 MG/DL — SIGNIFICANT CHANGE UP (ref 0.2–1.2)
BUN SERPL-MCNC: 72 MG/DL — HIGH (ref 7–23)
CALCIUM SERPL-MCNC: 7.6 MG/DL — LOW (ref 8.4–10.5)
CHLORIDE SERPL-SCNC: 102 MMOL/L — SIGNIFICANT CHANGE UP (ref 98–107)
CO2 SERPL-SCNC: 24 MMOL/L — SIGNIFICANT CHANGE UP (ref 22–31)
CREAT SERPL-MCNC: 2.69 MG/DL — HIGH (ref 0.5–1.3)
EGFR: 23 ML/MIN/1.73M2 — LOW
EGFR: 23 ML/MIN/1.73M2 — LOW
EOSINOPHIL # BLD AUTO: 0.03 K/UL — SIGNIFICANT CHANGE UP (ref 0–0.5)
EOSINOPHIL NFR BLD AUTO: 1.2 % — SIGNIFICANT CHANGE UP (ref 0–6)
GLUCOSE BLDC GLUCOMTR-MCNC: 131 MG/DL — HIGH (ref 70–99)
GLUCOSE BLDC GLUCOMTR-MCNC: 144 MG/DL — HIGH (ref 70–99)
GLUCOSE BLDC GLUCOMTR-MCNC: 156 MG/DL — HIGH (ref 70–99)
GLUCOSE BLDC GLUCOMTR-MCNC: 228 MG/DL — HIGH (ref 70–99)
GLUCOSE SERPL-MCNC: 150 MG/DL — HIGH (ref 70–99)
HCT VFR BLD CALC: 28.3 % — LOW (ref 39–50)
HGB BLD-MCNC: 9.1 G/DL — LOW (ref 13–17)
IANC: 1.57 K/UL — LOW (ref 1.8–7.4)
IMM GRANULOCYTES NFR BLD AUTO: 0.8 % — SIGNIFICANT CHANGE UP (ref 0–0.9)
LYMPHOCYTES # BLD AUTO: 0.74 K/UL — LOW (ref 1–3.3)
LYMPHOCYTES # BLD AUTO: 30.2 % — SIGNIFICANT CHANGE UP (ref 13–44)
MAGNESIUM SERPL-MCNC: 2.4 MG/DL — SIGNIFICANT CHANGE UP (ref 1.6–2.6)
MCHC RBC-ENTMCNC: 32.2 G/DL — SIGNIFICANT CHANGE UP (ref 32–36)
MCHC RBC-ENTMCNC: 34.6 PG — HIGH (ref 27–34)
MCV RBC AUTO: 107.6 FL — HIGH (ref 80–100)
MONOCYTES # BLD AUTO: 0.09 K/UL — SIGNIFICANT CHANGE UP (ref 0–0.9)
MONOCYTES NFR BLD AUTO: 3.7 % — SIGNIFICANT CHANGE UP (ref 2–14)
NEUTROPHILS # BLD AUTO: 1.57 K/UL — LOW (ref 1.8–7.4)
NEUTROPHILS NFR BLD AUTO: 64.1 % — SIGNIFICANT CHANGE UP (ref 43–77)
NRBC # BLD AUTO: 0 K/UL — SIGNIFICANT CHANGE UP (ref 0–0)
NRBC # FLD: 0 K/UL — SIGNIFICANT CHANGE UP (ref 0–0)
NRBC BLD AUTO-RTO: 0 /100 WBCS — SIGNIFICANT CHANGE UP (ref 0–0)
PHOSPHATE SERPL-MCNC: 5 MG/DL — HIGH (ref 2.5–4.5)
PLATELET # BLD AUTO: 125 K/UL — LOW (ref 150–400)
POTASSIUM SERPL-MCNC: 4.1 MMOL/L — SIGNIFICANT CHANGE UP (ref 3.5–5.3)
POTASSIUM SERPL-SCNC: 4.1 MMOL/L — SIGNIFICANT CHANGE UP (ref 3.5–5.3)
PROT SERPL-MCNC: 5.9 G/DL — LOW (ref 6–8.3)
RBC # BLD: 2.63 M/UL — LOW (ref 4.2–5.8)
RBC # FLD: 25 % — HIGH (ref 10.3–14.5)
SODIUM SERPL-SCNC: 142 MMOL/L — SIGNIFICANT CHANGE UP (ref 135–145)
WBC # BLD: 2.45 K/UL — LOW (ref 3.8–10.5)
WBC # FLD AUTO: 2.45 K/UL — LOW (ref 3.8–10.5)

## 2025-06-12 PROCEDURE — 99233 SBSQ HOSP IP/OBS HIGH 50: CPT

## 2025-06-12 PROCEDURE — 99232 SBSQ HOSP IP/OBS MODERATE 35: CPT

## 2025-06-12 PROCEDURE — 71250 CT THORAX DX C-: CPT | Mod: 26

## 2025-06-12 RX ADMIN — ISOSORBDIE DINITRATE 10 MILLIGRAM(S): 30 TABLET ORAL at 05:42

## 2025-06-12 RX ADMIN — APIXABAN 2.5 MILLIGRAM(S): 2.5 TABLET, FILM COATED ORAL at 18:00

## 2025-06-12 RX ADMIN — INSULIN GLARGINE-YFGN 10 UNIT(S): 100 INJECTION, SOLUTION SUBCUTANEOUS at 22:36

## 2025-06-12 RX ADMIN — INSULIN LISPRO 2: 100 INJECTION, SOLUTION INTRAVENOUS; SUBCUTANEOUS at 12:40

## 2025-06-12 RX ADMIN — Medication 40 MILLIGRAM(S): at 08:49

## 2025-06-12 RX ADMIN — ISOSORBDIE DINITRATE 10 MILLIGRAM(S): 30 TABLET ORAL at 12:44

## 2025-06-12 RX ADMIN — GABAPENTIN 300 MILLIGRAM(S): 400 CAPSULE ORAL at 05:40

## 2025-06-12 RX ADMIN — METOPROLOL SUCCINATE 12.5 MILLIGRAM(S): 50 TABLET, EXTENDED RELEASE ORAL at 05:41

## 2025-06-12 RX ADMIN — CLOPIDOGREL BISULFATE 75 MILLIGRAM(S): 75 TABLET, FILM COATED ORAL at 12:36

## 2025-06-12 RX ADMIN — ROSUVASTATIN CALCIUM 20 MILLIGRAM(S): 20 TABLET, FILM COATED ORAL at 21:31

## 2025-06-12 RX ADMIN — Medication 2 TABLET(S): at 21:32

## 2025-06-12 RX ADMIN — Medication 75 MILLIGRAM(S): at 05:40

## 2025-06-12 RX ADMIN — ISOSORBDIE DINITRATE 10 MILLIGRAM(S): 30 TABLET ORAL at 00:10

## 2025-06-12 RX ADMIN — ISOSORBDIE DINITRATE 10 MILLIGRAM(S): 30 TABLET ORAL at 18:01

## 2025-06-12 RX ADMIN — Medication 667 MILLIGRAM(S): at 08:49

## 2025-06-12 RX ADMIN — Medication 75 MILLIGRAM(S): at 14:00

## 2025-06-12 RX ADMIN — Medication 650 MILLIGRAM(S): at 09:20

## 2025-06-12 RX ADMIN — Medication 650 MILLIGRAM(S): at 08:49

## 2025-06-12 RX ADMIN — Medication 667 MILLIGRAM(S): at 18:00

## 2025-06-12 RX ADMIN — Medication 75 MILLIGRAM(S): at 21:31

## 2025-06-12 RX ADMIN — APIXABAN 2.5 MILLIGRAM(S): 2.5 TABLET, FILM COATED ORAL at 05:40

## 2025-06-12 RX ADMIN — GABAPENTIN 300 MILLIGRAM(S): 400 CAPSULE ORAL at 18:00

## 2025-06-12 RX ADMIN — INSULIN LISPRO 1: 100 INJECTION, SOLUTION INTRAVENOUS; SUBCUTANEOUS at 08:49

## 2025-06-12 RX ADMIN — Medication 1 APPLICATION(S): at 05:41

## 2025-06-12 RX ADMIN — Medication 200 MILLIGRAM(S): at 12:37

## 2025-06-12 RX ADMIN — Medication 667 MILLIGRAM(S): at 12:36

## 2025-06-12 NOTE — PROGRESS NOTE ADULT - ASSESSMENT
85 Y/O M PMH MDS, HTN, CKD 3, A Fib, Anemia, HTN, Prostate CA, gout, obesity, presents with 3 days of orthopnea, increasing shortness of breath and weight gain  CHF   CKD stage 4   Anemia, thrombocytopenia, leukopenia - Pancytopenia   Severe AS   Hyperphosphatemia  - improving   Hypocalcemia     1 Renal- creatinine stable, now on po bumexn creatinine improving   Trend serum creatinine and strict ins and outs   Encourage po intake   Continue calcium acetate 667 po tid - this will likely not be needed as outpt   2 CVS- BP stable, s/p nitro gtt, now on Toprol 12.5 mg po daily, Hydralazine 75mg po tid and Isordil 10 mg po tid   TTE revealed severe AS; no further TAVR workup as per cards not TAVR candidate  3 Pulm- now off bipap and on nasal cannula ;  Set up for home oxygen   May need CT of the chest to assess the effusions and see if he would benefit from possible thoracentesis   4 Heme - Hgb stable, Serial cbc    Sayed [facility]   [phone]

## 2025-06-12 NOTE — PROGRESS NOTE ADULT - SUBJECTIVE AND OBJECTIVE BOX
Patient is a 84y old  Male who presents with a chief complaint of Fluid overload (12 Jun 2025 14:13)      INTERVAL HPI/OVERNIGHT EVENTS:  Seen by me this afternoon, sitting in chair, doing well, on RA, Aide at bedside, passed TOV. Good appetite.    Review of Systems: 12 point review of systems otherwise negative    MEDICATIONS  (STANDING):  allopurinol 200 milliGRAM(s) Oral daily  apixaban      apixaban 2.5 milliGRAM(s) Oral two times a day  calcium acetate 667 milliGRAM(s) Oral three times a day with meals  chlorhexidine 2% Cloths 1 Application(s) Topical <User Schedule>  clopidogrel Tablet 75 milliGRAM(s) Oral daily  dextrose 5%. 1000 milliLiter(s) (50 mL/Hr) IV Continuous <Continuous>  dextrose 5%. 1000 milliLiter(s) (100 mL/Hr) IV Continuous <Continuous>  dextrose 50% Injectable 25 Gram(s) IV Push once  dextrose 50% Injectable 12.5 Gram(s) IV Push once  dextrose 50% Injectable 25 Gram(s) IV Push once  gabapentin 300 milliGRAM(s) Oral two times a day  glucagon  Injectable 1 milliGRAM(s) IntraMuscular once  hydrALAZINE 75 milliGRAM(s) Oral three times a day  insulin glargine Injectable (LANTUS) 10 Unit(s) SubCutaneous at bedtime  insulin lispro (ADMELOG) corrective regimen sliding scale   SubCutaneous three times a day before meals  insulin lispro (ADMELOG) corrective regimen sliding scale   SubCutaneous at bedtime  isosorbide   dinitrate Tablet (ISORDIL) 10 milliGRAM(s) Oral three times a day  levoFLOXacin  Tablet 250 milliGRAM(s) Oral every 24 hours  metoprolol succinate ER 12.5 milliGRAM(s) Oral daily  pantoprazole    Tablet 40 milliGRAM(s) Oral before breakfast  polyethylene glycol 3350 17 Gram(s) Oral daily  rosuvastatin 20 milliGRAM(s) Oral at bedtime  senna 2 Tablet(s) Oral at bedtime  sodium chloride 0.9%. 1000 milliLiter(s) (50 mL/Hr) IV Continuous <Continuous>    MEDICATIONS  (PRN):  acetaminophen     Tablet .. 650 milliGRAM(s) Oral every 6 hours PRN Moderate Pain (4 - 6)  dextrose Oral Gel 15 Gram(s) Oral once PRN Blood Glucose LESS THAN 70 milliGRAM(s)/deciliter  melatonin 3 milliGRAM(s) Oral at bedtime PRN Insomnia      Allergies    No Known Allergies    Intolerances          Vital Signs Last 24 Hrs  T(C): 36.3 (12 Jun 2025 20:36), Max: 36.7 (12 Jun 2025 05:11)  T(F): 97.4 (12 Jun 2025 20:36), Max: 98 (12 Jun 2025 05:11)  HR: 83 (12 Jun 2025 20:36) (80 - 83)  BP: 119/67 (12 Jun 2025 20:36) (105/61 - 119/67)  BP(mean): --  RR: 18 (12 Jun 2025 20:36) (17 - 18)  SpO2: 100% (12 Jun 2025 20:36) (96% - 100%)    Parameters below as of 12 Jun 2025 20:36  Patient On (Oxygen Delivery Method): room air      CAPILLARY BLOOD GLUCOSE      POCT Blood Glucose.: 144 mg/dL (12 Jun 2025 21:51)  POCT Blood Glucose.: 131 mg/dL (12 Jun 2025 17:38)  POCT Blood Glucose.: 228 mg/dL (12 Jun 2025 12:25)  POCT Blood Glucose.: 156 mg/dL (12 Jun 2025 08:15)      06-11 @ 07:01  -  06-12 @ 07:00  --------------------------------------------------------  IN: 120 mL / OUT: 1575 mL / NET: -1455 mL        Physical Exam:    Daily     Daily   General:  Well appearing, NAD, not cachetic  HEENT:  Nonicteric, PERRLA  CV:  S1S2 Ok  Lungs:  CTA B/L, no wheezes, rales, rhonchi  Abdomen:  Soft, non-tender, no distended, positive BS  Extremities:  2+ pulses, no c/c, no edema  Skin:  Warm and dry, no rashes  :  No sandhu  Neuro:  AAOx3, non-focal, CN II-XII grossly intact  No Restraints    LABS:                        9.1    2.45  )-----------( 125      ( 12 Jun 2025 06:20 )             28.3     06-12    142  |  102  |  72[H]  ----------------------------<  150[H]  4.1   |  24  |  2.69[H]    Ca    7.6[L]      12 Jun 2025 06:20  Phos  5.0     06-12  Mg     2.40     06-12    TPro  5.9[L]  /  Alb  3.4  /  TBili  0.8  /  DBili  x   /  AST  16  /  ALT  9   /  AlkPhos  74  06-12      Urinalysis Basic - ( 12 Jun 2025 06:20 )    Color: x / Appearance: x / SG: x / pH: x  Gluc: 150 mg/dL / Ketone: x  / Bili: x / Urobili: x   Blood: x / Protein: x / Nitrite: x   Leuk Esterase: x / RBC: x / WBC x   Sq Epi: x / Non Sq Epi: x / Bacteria: x          RADIOLOGY & ADDITIONAL TESTS:  Reviewed by me

## 2025-06-12 NOTE — PROGRESS NOTE ADULT - ASSESSMENT
83 y/o male with PMHX of CAD (LHC 3/18/21 mild LAD 60%, OM1 60%, Prox  ), HFrEF (JEAN 12/26/24 LVEF 35%, trace TR, MR), afib, anemia, renal artery stenosis s/p L renal stent, HTN, prstate CA/ MDS currently on chemotherapy via PICC line, last dose was in mid April.  Pt comes in with complaints of SOB and weight gain. He was found to be hypoxic in ED with O2 sat 80s, was placed on BIPAP. HF consult called on 6/3/25 to help manage care in this patient who is suspected to be in ADHF. Labs show proBNP 6482, lactate 2.5, BUN/Cr 41/2.55, H/H 8.3/25, trop 171/166. CXR shows b/l pleural effusions. Pt was placed on BIPAP,  and was diuresed. He admits to eating very salty meals- frozen dinners frequently. He is compliant with taking all HF medications, given to him by his aide. Overall stage C HF, NYHA class IV-admitted with severe hypervolemia, but has diuresed well and is currently hypovolemic.

## 2025-06-12 NOTE — PROGRESS NOTE ADULT - SUBJECTIVE AND OBJECTIVE BOX
NEPHROLOGY     Patient seen and examined resting on 2L NC, no sob, no new complaints, in no acute distress.     MEDICATIONS  (STANDING):  allopurinol 200 milliGRAM(s) Oral daily  apixaban      apixaban 2.5 milliGRAM(s) Oral two times a day  calcium acetate 667 milliGRAM(s) Oral three times a day with meals  chlorhexidine 2% Cloths 1 Application(s) Topical <User Schedule>  clopidogrel Tablet 75 milliGRAM(s) Oral daily  dextrose 5%. 1000 milliLiter(s) (50 mL/Hr) IV Continuous <Continuous>  dextrose 5%. 1000 milliLiter(s) (100 mL/Hr) IV Continuous <Continuous>  dextrose 50% Injectable 25 Gram(s) IV Push once  dextrose 50% Injectable 12.5 Gram(s) IV Push once  dextrose 50% Injectable 25 Gram(s) IV Push once  gabapentin 300 milliGRAM(s) Oral two times a day  glucagon  Injectable 1 milliGRAM(s) IntraMuscular once  hydrALAZINE 75 milliGRAM(s) Oral three times a day  insulin glargine Injectable (LANTUS) 10 Unit(s) SubCutaneous at bedtime  insulin lispro (ADMELOG) corrective regimen sliding scale   SubCutaneous three times a day before meals  insulin lispro (ADMELOG) corrective regimen sliding scale   SubCutaneous at bedtime  isosorbide   dinitrate Tablet (ISORDIL) 10 milliGRAM(s) Oral three times a day  levoFLOXacin  Tablet 250 milliGRAM(s) Oral every 24 hours  metoprolol succinate ER 12.5 milliGRAM(s) Oral daily  pantoprazole    Tablet 40 milliGRAM(s) Oral before breakfast  polyethylene glycol 3350 17 Gram(s) Oral daily  rosuvastatin 20 milliGRAM(s) Oral at bedtime  senna 2 Tablet(s) Oral at bedtime  sodium chloride 0.9%. 1000 milliLiter(s) (50 mL/Hr) IV Continuous <Continuous>    VITALS:  T(C): , Max: 37.1 (06-11-25 @ 13:04)  T(F): , Max: 98.7 (06-11-25 @ 13:04)  HR: 80 (06-12-25 @ 05:11)  BP: 118/65 (06-12-25 @ 05:11)  RR: 17 (06-12-25 @ 05:11)  SpO2: 96% (06-12-25 @ 05:11)    I and O's:    06-11 @ 07:01  -  06-12 @ 07:00  --------------------------------------------------------  IN: 120 mL / OUT: 1575 mL / NET: -1455 mL    PHYSICAL EXAM:  Constitutional: NAD  HEENT: PERRLA, EOMI,  MMM  Neck: No LAD, No JVD  Respiratory: diminished   Cardiovascular: S1 and S2  Gastrointestinal: BS+, soft, NT/ND  Extremities: No peripheral edema  Neurological: A/O x 3, no focal deficits  Psychiatric: Normal mood, normal affect  : no Min  Skin: No rashes    LABS:                        9.1    2.45  )-----------( 125      ( 12 Jun 2025 06:20 )             28.3     06-12    142  |  102  |  72[H]  ----------------------------<  150[H]  4.1   |  24  |  2.69[H]    Ca    7.6[L]      12 Jun 2025 06:20  Phos  5.0     06-12  Mg     2.40     06-12    TPro  5.9[L]  /  Alb  3.4  /  TBili  0.8  /  DBili  x   /  AST  16  /  ALT  9   /  AlkPhos  74  06-12    ASSESSMENT/PLAN:   85 Y/O M PMH MDS, HTN, CKD 3, A Fib, Anemia, HTN, Prostate CA, gout, obesity, presents with 3 days of orthopnea, increasing shortness of breath and weight gain  CHF   CKD stage 4   Anemia, thrombocytopenia, leukopenia - Pancytopenia   Severe AS   Hyperphosphatemia  - improving   Hypocalcemia     1 Renal- creatinine stable, now on po bumex  Trend serum creatinine and strict ins and outs   Encourage po intake   Continue calcium acetate 667 po tid   2 CVS- BP stable, s/p nitro gtt, now on Toprol 12.5 mg po daily, Hydralazine 75mg po tid and Isordil 10 mg po tid   TTE revealed severe AS; no further TAVR workup as per cards not TAVR candidate  3 Pulm- now off bipap and on nasal cannula   May need CT of the chest to assess the effusions and see if he would benefit from possible thoracentesis   4 Heme - Hgb stable, Serial cbc   NEPHROLOGY     Patient seen and examined resting on 2L NC, no sob, no new complaints, in no acute distress.     MEDICATIONS  (STANDING):  allopurinol 200 milliGRAM(s) Oral daily.  apixaban      apixaban 2.5 milliGRAM(s) Oral two times a day  calcium acetate 667 milliGRAM(s) Oral three times a day with meals  chlorhexidine 2% Cloths 1 Application(s) Topical <User Schedule>  clopidogrel Tablet 75 milliGRAM(s) Oral daily  dextrose 5%. 1000 milliLiter(s) (50 mL/Hr) IV Continuous <Continuous>  dextrose 5%. 1000 milliLiter(s) (100 mL/Hr) IV Continuous <Continuous>  dextrose 50% Injectable 25 Gram(s) IV Push once  dextrose 50% Injectable 12.5 Gram(s) IV Push once  dextrose 50% Injectable 25 Gram(s) IV Push once  gabapentin 300 milliGRAM(s) Oral two times a day  glucagon  Injectable 1 milliGRAM(s) IntraMuscular once  hydrALAZINE 75 milliGRAM(s) Oral three times a day  insulin glargine Injectable (LANTUS) 10 Unit(s) SubCutaneous at bedtime  insulin lispro (ADMELOG) corrective regimen sliding scale   SubCutaneous three times a day before meals  insulin lispro (ADMELOG) corrective regimen sliding scale   SubCutaneous at bedtime  isosorbide   dinitrate Tablet (ISORDIL) 10 milliGRAM(s) Oral three times a day  levoFLOXacin  Tablet 250 milliGRAM(s) Oral every 24 hours  metoprolol succinate ER 12.5 milliGRAM(s) Oral daily  pantoprazole    Tablet 40 milliGRAM(s) Oral before breakfast  polyethylene glycol 3350 17 Gram(s) Oral daily  rosuvastatin 20 milliGRAM(s) Oral at bedtime.  senna 2 Tablet(s) Oral at bedtime  sodium chloride 0.9%. 1000 milliLiter(s) (50 mL/Hr) IV Continuous <Continuous>    VITALS:  T(C): , Max: 37.1 (06-11-25 @ 13:04)  T(F): , Max: 98.7 (06-11-25 @ 13:04)  HR: 80 (06-12-25 @ 05:11).  BP: 118/65 (06-12-25 @ 05:11)  RR: 17 (06-12-25 @ 05:11)  SpO2: 96% (06-12-25 @ 05:11)    I and O's:    06-11 @ 07:01  -  06-12 @ 07:00  --------------------------------------------------------  IN: 120 mL / OUT: 1575 mL / NET: -1455 mL    PHYSICAL EXAM:  Constitutional: NAD  HEENT: PERRLA, EOMI,  MMM  Neck: No LAD, No JVD  Respiratory: diminished .  Cardiovascular: S1 and S2  Gastrointestinal: BS+, soft, NT/ND  Extremities: No peripheral edema  Neurological: A/O x 3, no focal deficits  Psychiatric: Normal mood, normal affect  : no Min  Skin: No rashes    LABS:                        9.1    2.45  )-----------( 125      ( 12 Jun 2025 06:20 )             28.3     06-12    142  |  102  |  72[H]  ----------------------------<  150[H]  4.1   |  24  |  2.69[H]    Ca    7.6[L]      12 Jun 2025 06:20  Phos  5.0     06-12  Mg     2.40     06-12    TPro  5.9[L]  /  Alb  3.4  /  TBili  0.8  /  DBili  x   /  AST  16  /  ALT  9   /  AlkPhos  74  06-12

## 2025-06-12 NOTE — PROGRESS NOTE ADULT - SUBJECTIVE AND OBJECTIVE BOX
Interval History:  Patient resting comfortably in bed   Denies CP/SOB/palpitations/dizziness.  No acute events overnight.    Medications:  acetaminophen     Tablet .. 650 milliGRAM(s) Oral every 6 hours PRN  allopurinol 200 milliGRAM(s) Oral daily  apixaban      apixaban 2.5 milliGRAM(s) Oral two times a day  calcium acetate 667 milliGRAM(s) Oral three times a day with meals  chlorhexidine 2% Cloths 1 Application(s) Topical <User Schedule>  clopidogrel Tablet 75 milliGRAM(s) Oral daily  dextrose 5%. 1000 milliLiter(s) IV Continuous <Continuous>  dextrose 5%. 1000 milliLiter(s) IV Continuous <Continuous>  dextrose 50% Injectable 25 Gram(s) IV Push once  dextrose 50% Injectable 12.5 Gram(s) IV Push once  dextrose 50% Injectable 25 Gram(s) IV Push once  dextrose Oral Gel 15 Gram(s) Oral once PRN  gabapentin 300 milliGRAM(s) Oral two times a day  glucagon  Injectable 1 milliGRAM(s) IntraMuscular once  hydrALAZINE 75 milliGRAM(s) Oral three times a day  insulin glargine Injectable (LANTUS) 10 Unit(s) SubCutaneous at bedtime  insulin lispro (ADMELOG) corrective regimen sliding scale   SubCutaneous three times a day before meals  insulin lispro (ADMELOG) corrective regimen sliding scale   SubCutaneous at bedtime  isosorbide   dinitrate Tablet (ISORDIL) 10 milliGRAM(s) Oral three times a day  levoFLOXacin  Tablet 250 milliGRAM(s) Oral every 24 hours  melatonin 3 milliGRAM(s) Oral at bedtime PRN  metoprolol succinate ER 12.5 milliGRAM(s) Oral daily  pantoprazole    Tablet 40 milliGRAM(s) Oral before breakfast  polyethylene glycol 3350 17 Gram(s) Oral daily  rosuvastatin 20 milliGRAM(s) Oral at bedtime  senna 2 Tablet(s) Oral at bedtime  sodium chloride 0.9%. 1000 milliLiter(s) IV Continuous <Continuous>      Vitals:  T(C): 36.7 (06-12-25 @ 05:11), Max: 37.1 (06-11-25 @ 13:04)  HR: 80 (06-12-25 @ 05:11) (78 - 89)  BP: 118/65 (06-12-25 @ 05:11) (106/48 - 121/59)  BP(mean): --  RR: 17 (06-12-25 @ 05:11) (17 - 18)  SpO2: 96% (06-12-25 @ 05:11) (94% - 99%)    Daily     Daily         I&O's Summary    11 Jun 2025 07:01  -  12 Jun 2025 07:00  --------------------------------------------------------  IN: 120 mL / OUT: 1575 mL / NET: -1455 mL        Physical Exam:  Appearance: No Acute Distress  Neck: No JVD  Cardiovascular: Normal S1 S2  Respiratory: Clear to auscultation bilaterally  Gastrointestinal: Soft, Non-tender	  Skin: No cyanosis	  Neurologic: Non-focal  Extremities: No LE edema    Labs:                        9.1    2.45  )-----------( 125      ( 12 Jun 2025 06:20 )             28.3     06-12    142  |  102  |  72[H]  ----------------------------<  150[H]  4.1   |  24  |  2.69[H]    Ca    7.6[L]      12 Jun 2025 06:20  Phos  5.0     06-12  Mg     2.40     06-12    TPro  5.9[L]  /  Alb  3.4  /  TBili  0.8  /  DBili  x   /  AST  16  /  ALT  9   /  AlkPhos  74  06-12        TELEMETRY:    Echocardiogram:  < from: TTE Limited W or WO Ultrasound Enhancing Agent (06.09.25 @ 16:38) >     CONCLUSIONS:      1. The inferior vena cava is normal in size measuring 1.73 cm in diameter, (normal <2.1cm) with normal inspiratory collapse (normal >50%) consistent with normal right atrial pressure (~3, range 0-5mmHg).    ________________________________________________________________________________________  FINDINGS:     Systemic Veins:  The inferior vena cava is normal in size measuring 1.73 cm in diameter, (normal <2.1cm) with normal inspiratory collapse (normal >50%) consistent with normal right atrial pressure (~3, range 0-5mmHg).  ____________________________________________________________________  QUANTITATIVE DATA:        Tricuspid Valve Measurements:     RA Pressure: 3 mmHg

## 2025-06-12 NOTE — PROGRESS NOTE ADULT - ASSESSMENT
84-year-old male with a past medical history of myelodysplastic syndrome (MDS), hypertension (HTN), chronic kidney disease stage 3 (CKD 3), atrial fibrillation (A Fib), anemia, prostate cancer, gout, and obesity, presented with acute hypoxic respiratory failure requiring BiPAP secondary to an acute exacerbation of heart failure with reduced ejection fraction (HFrEF). He was transferred to the coronary care unit (CCU) and diuresed. He is now stabilized and has been transferred back to the medical floor.    Active Problems:    Myelodysplastic Syndrome (MDS)  Acute on chronic heart failure with reduced ejection fraction (HFrEF)  Acute hypoxic respiratory failure  Severe Aortic Stenosis  Chronic kidney disease stage 3 (CKD 3)  Atrial fibrillation with rapid ventricular response (A Fib with RVR)/pAFib  Hypercoagulable state  Anemia  Hypertension (HTN)  Prostate cancer  Gout  Steroid induced hyperglycemia  Obesity  Immunosuppressed status    MDS: History of MDS;   Last chemotherapy on May 20th. 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 Per outpatient chart, anemia is also multifactorial (anemia of chronic disease/anemia of renal failure). Patient takes levofloxacin for neutropenia. Continue outpatient follow-up.    Acute on Chronic HFrEF/Acute Hypoxic Respiratory Failure  TTE revealed severe aortic stenosis (AS), repeat limited TTE 6/9 w/ normal RA pressure  C/w Hydralazine 75mg every 8 hours, C/w Isosorbide dinitrate (ISDN) 10mg every 8 hours  Apprec HF recs, holding bumex at this time, s/p IVF's 6/10 due to low JVP/hypovolemia  C/w Toprol 12.5 mg po qd  CXR 6/10: Bilateral pleural effusions unchanged  Per Interventional Cards, case was d/w with interventionalist, Dr Mendoza, deemed patient is not a TAVR candidate, recommending medical therapy at this time given patient is high risk for bleeding and increased risk for complications  - No further TAVR work-up at this time  - Continue clopidogrel (Plavix), statin. Strict intake and output monitoring. Daily standing weights. Monitor electrolytes; replete potassium to > 4.0 mEq/L and magnesium to > 2.0 mg/dL  - On RA today, will need Desaturation study prior to discharge  - CT Chest per Renal recs 6/12: Small bilateral pleural effusions.  - Passed TOV    Probable fracture through the body of T9 vertebral body  Seen on CT chest done 6/12  - Will obtain MRI for further evaluation  - Asymptomatic at this time    Steroid induced hyperglycemia  FS much improved  C/w lantus 10U qhs  C/w SAMIR for now    Acute on chronic CKD 3  Nephrology consulted. Recommendations appreciated  C/w calcium acetate 667 po tid   No renal objection for LHC/RHC  Cr improving off diuretics, 2.69 today  Monitor BMP    Hypercoagulable State/A Fib with RVR, pAFib  On apixaban (Eliquis)  Now on NSR    Anemia  Monitor as above (under MDS)    Hypertension  Continue medications as above for heart failure    PT eval --> NERI but pt wants to go back home with continued 24/7 care  Discussed with Son Vance at bedside on 6/10. Anticipate DC Home soon as no further plans for TAVR work-up.

## 2025-06-12 NOTE — PROGRESS NOTE ADULT - TIME-BASED
"2/9/2023       RE: Yaya Mg  [address] [place] [address]     Dear Colleague,    Thank you for referring your patient, Yaya Mg, to the [facility] UROLOGY [facility] [place] at [facility]. Please see a copy of my visit note below.    CC: Yaya Mg  is post-op from IPP, [facility], done on 1/3/22.  HPI: Patient is 5 wks post-op.  He has been doing well. No fevers or chills. Pain resolved.     Exam: BP (!) 151/83 (BP Location: Right arm, Patient Position: Sitting, Cuff Size: Adult Large)   Pulse 89   Ht 1.753 m (5' 9\")   Wt 106.6 kg (235 lb)   SpO2 100%   BMI 34.70 kg/m   . NAD.   Incision healing well. No discharge, erythema or fluctuence suggestive of infection.   Penis is deflated well.  Tips symmetric in the mid glans , pump is easily palpable in the anterior scrotum.  No signs of infection.  He's able to demonstrate how to cycle the device up and down.      Assessment  Erectile dysfunction secondary to DM type 1.    Post-op from IPP placement    PLAN:   Instructed him to cycle the device for the next several days.  Keep deflated as much as possible.  OK to deflate if needed.  OK for intercourse in a week.  OK for return to work without restrictions.  Return to clinic MARY Tenorio MD    Visit within post-op global.         "
56
[facility] UROLOGY CLINIC [address]  4TH FLO[address]  [facility] [address]  [phone]          February 9, 2023    RE:  Yaya Mg                                                                                                                                                       [facility] [facility] [address]            To whom it may concern:    Yaya Mg is under my professional care in urology clinic.  He underwent surgery Paul 3, 2023.    He is now medically cleared to return to work as of 2/13/2023, without any particular activity restrictions.  Please contact my office with any questions.      Sincerely,        Amandeep Hill MD    
50
50
59
50

## 2025-06-12 NOTE — PROGRESS NOTE ADULT - ASSESSMENT
83 y/o male, PMHx of CAD (C 3/18/21 mild LAD 60%, OM1 60%, Prox  ), HFrEF (JEAN 12/26/24 LVEF 35%, trace TR, MR), AFIB (on Eliquis), Renal Artery Stenosis, s/p left renal stent, HTN, Anemia, Prostate Cancer, MDS currently on chemotherapy via PICC line, last dose was in mid April. admitted for decompensated on chronic HFrEF and admitted to CCU for further management.  Heart Failure team consulted, Overall stage C HF, NYHA class IV-severely hypervolemic and hypoxic requiring BIPAP.  Subsequent repeat TTE done 6/4/25 showing significant aortic stenosis, possible severe. Interventional cardiology consulted for TAVR evaluation.  Stepped down from CCU to regional floor for further management    # Aortic Stenosis  # Acute systolic heart failure     - Euvolemic on exam, SpO2 96-98%, O2, 2L NC, tolerating well  - TTE 6/4:  EF 40-45%, RWMA present.  Hypokinesis of the inferior and inferolateral walls.  Moderate MR. pVel 3.51, pGrad 49.3, mGrad 26.0, LVOT/aortic valve VTI ratio of 0.25.  The gross appearance of the aortic valve is consistent with significant aortic stenosis. However, unable to accurately estimate the aortic valve area.  Mild to moderate AR.   - Interventional cards consulted.  Discussed with patient, details and indication for further TAVR evaluation with Right/Left Heart Cath, JEAN and CTA TAVR, to which he is amendable   - Nitro gtt d/c'ed 6/5  - HF following, recs appreciated.  IV/PO Bumex d/ricardo.  Continue Hydralazine 75mg TID and Isordil 10mg TID (hold SBP < 100)  - monitor electrolytes, replete to keep K > 4, Mg > 2  - Strict I/Os. Daily weights, fluid restriction  - case discussed with interventionalist, Dr Mendoza, deemed patient is not a TAVR candidate, recommending medical therapy at this time given patient is high risk for bleeding and increased risk for complications.  No further TAVR work-up at this time. Cardiology signing off, please [email or web address] [phone] to reconsult if needed    # CAD  # USHA  - s/p Dx [facility]  2021:  LM normal, LAD 60%, OM1 60%, %   - s/p L Renal stent on DAPT  - continue Rosuvastatin 20mg daily at bedtime    # JUAN RAMON on CKD  - CKD stage 3, baseline Cr ~ 2  - Nephro following, recs appreciated  - Cr stable 2.66 today.  Trend BUN/Cr daily. Avoid nephrotoxic agents  - h/o Renal artery stent, on DAPT  - Further plan per Nephro    # Paroxysmal Atrial Fibrillation   - NSR, HR stable 70s  - EP consulted, recs appreciated  - continue Eliquis 2.5mg BID (renally dosed)  - continue Metoprolol Succ 12.5mg daily    # Anemia  # Myelodysplastic Syndrome (MDS)  - History of MDS, last chemo dose 5/20/25   - Follows with outpatient HemeOnc  - H/H and Plts stable   - On Levofloxacin for neutropenia  - Further plan per primary team

## 2025-06-12 NOTE — PROGRESS NOTE ADULT - NS ATTEND AMEND GEN_ALL_CORE FT
A/w acute hypoxemic resp failure i/s/o possible severe AS.    CT chest on 6/12 showed small bilateral pleural effusions.  Pt deemed not a suitable TAVR candidate.  SCr downtrending off diuretics.  TTE showed likely nl CVP.    Continue to hold Bumex.  D/c planning. Would get ambulatory pulse oximetry to evaluate need for home O2.

## 2025-06-12 NOTE — PROGRESS NOTE ADULT - SUBJECTIVE AND OBJECTIVE BOX
Patient seen and examined at bedside. HDS.  Pt currently denies CP, SOB, palpitations, diaphoresis, dizziness, Syncope, LE swelling, acute bleed or any other complaints at this time    - No events on telemetry overnight, remains in sinus rhythm    PERTINENT REVIEW OF SYSTEMS:  Constitutional:     [ ] negative [ ] fevers [ ] chills [ ] weight loss [ ] weight gain  CV:                         [ ] negative  [ ] chest pain [ ] orthopnea [ ] palpitations [ ] murmur  Resp:                     [ ] negative [ ] cough [ ] shortness of breath [ ] dyspnea [ ] wheezing [ ] sputum [ ]hemoptysis  GI:                          [ ] negative [ ] nausea [ ] vomiting [ ] diarrhea [ ] constipation [ ] abd pain [ ] dysphagia   Skin:                       [ ] negative [ ] rash [ ] itch  Neurological:        [ ] negative [ ] headache [ ] dizziness [ ] syncope [ ] weakness [ ] numbness  Psychiatric:           [ ] negative [ ] anxiety [ ] depression  Endocrine:            [ ] negative [ ] diabetes [ ] thyroid problem  Heme/Lymph:      [ ] negative [ ] anemia [ ] bleeding problem  Allergic/Immune: [ ] negative [ ] itchy eyes [ ] nasal discharge [ ] hives [ ] angioedema    [x] All other systems negative  [ ] Unable to assess ROS due to    Home Medications  Home Medications:  allopurinol 100 mg oral tablet: 2 tab(s) orally once a day (03 Jun 2025 01:31)  amLODIPine 5 mg oral tablet: 1 tab(s) orally once a day (03 Jun 2025 01:31)  aspirin 81 mg oral capsule: 1 cap(s) orally once a day (03 Jun 2025 01:31)  gabapentin 300 mg oral capsule: 1 cap(s) orally 3 times a day (04 Jun 2025 15:46)  levoFLOXacin 500 mg oral tablet: 1 tab(s) orally once a day (04 Jun 2025 15:47)  omeprazole 20 mg oral delayed release tablet: 1 tab(s) orally once a day (03 Jun 2025 01:31)  Plavix 75 mg oral tablet: 1 tab(s) orally once a day (03 Jun 2025 01:31)  rosuvastatin 10 mg oral tablet: 1 tab(s) orally once a day (03 Jun 2025 01:31)    Current Meds:  acetaminophen     Tablet .. 650 milliGRAM(s) Oral every 6 hours PRN  allopurinol 200 milliGRAM(s) Oral daily  apixaban      apixaban 2.5 milliGRAM(s) Oral two times a day  calcium acetate 667 milliGRAM(s) Oral three times a day with meals  chlorhexidine 2% Cloths 1 Application(s) Topical <User Schedule>  clopidogrel Tablet 75 milliGRAM(s) Oral daily  dextrose 5%. 1000 milliLiter(s) IV Continuous <Continuous>  dextrose 5%. 1000 milliLiter(s) IV Continuous <Continuous>  dextrose 50% Injectable 25 Gram(s) IV Push once  dextrose 50% Injectable 12.5 Gram(s) IV Push once  dextrose 50% Injectable 25 Gram(s) IV Push once  dextrose Oral Gel 15 Gram(s) Oral once PRN  gabapentin 300 milliGRAM(s) Oral two times a day  glucagon  Injectable 1 milliGRAM(s) IntraMuscular once  hydrALAZINE 75 milliGRAM(s) Oral three times a day  insulin glargine Injectable (LANTUS) 10 Unit(s) SubCutaneous at bedtime  insulin lispro (ADMELOG) corrective regimen sliding scale   SubCutaneous three times a day before meals  insulin lispro (ADMELOG) corrective regimen sliding scale   SubCutaneous at bedtime  isosorbide   dinitrate Tablet (ISORDIL) 10 milliGRAM(s) Oral three times a day  levoFLOXacin  Tablet 250 milliGRAM(s) Oral every 24 hours  melatonin 3 milliGRAM(s) Oral at bedtime PRN  metoprolol succinate ER 12.5 milliGRAM(s) Oral daily  pantoprazole    Tablet 40 milliGRAM(s) Oral before breakfast  polyethylene glycol 3350 17 Gram(s) Oral daily  rosuvastatin 20 milliGRAM(s) Oral at bedtime  senna 2 Tablet(s) Oral at bedtime  sodium chloride 0.9%. 1000 milliLiter(s) IV Continuous <Continuous>    PAST MEDICAL & SURGICAL HISTORY:  HTN - Hypertension      Hypercholesterolemia      PC (prostate cancer)  s/p surgical resection 3 years ago      Gout      Aneurysm, ascending aorta      H/O prostatectomy      H/O carotid endarterectomy      H/O renal artery stenosis  s/p stent in Left RA      Status post cataract extraction, unspecified laterality    Vitals:  T(F): 97.5 (06-12), Max: 98 (06-12)  HR: 80 (06-12) (80 - 86)  BP: 105/61 (06-12) (105/61 - 121/59)  RR: 17 (06-12)  SpO2: 98% (06-12)  I&O's Summary    11 Jun 2025 07:01  -  12 Jun 2025 07:00  --------------------------------------------------------  IN: 120 mL / OUT: 1575 mL / NET: -1455 mL    Physical Exam:  Appearance: No acute distress; well appearing  Neck: Supple, no JVD B/L, no Carotid Bruit B/L  Cardiovascular: +LESA, RRR, S1, S2  Respiratory: Clear to auscultation bilaterally, no RRW B/L  Gastrointestinal: soft, non-tender, non-distended with normal bowel sounds  Neurologic: No focal or neuro deficits noted  Psychiatry: AAOx3, mood & affect appropriate  Extremities:   No LE swelling bilaterally, palpable pulses throughout                        9.1    2.45  )-----------( 125      ( 12 Jun 2025 06:20 )             28.3     06-12    142  |  102  |  72[H]  ----------------------------<  150[H]  4.1   |  24  |  2.69[H]    Ca    7.6[L]      12 Jun 2025 06:20  Phos  5.0     06-12  Mg     2.40     06-12    TPro  5.9[L]  /  Alb  3.4  /  TBili  0.8  /  DBili  x   /  AST  16  /  ALT  9   /  AlkPhos  74  06-12      Cardiac Imaging  ---------------------  < from: TTE W or WO Ultrasound Enhancing Agent (06.04.25 @ 06:31) >    CONCLUSIONS:      1. The left ventricular cavity is normal in size. Left ventricular wall thickness is normal. Left ventricular systolic function is mildly to moderately decreased with an ejection fraction visually estimated at 40 to 45%. There are regional wall motion abnormalities present. Hypokinesis of the inferior and inferolateral walls.   2. Normal right ventricular cavity size and probably normal right ventricular systolic function. Tricuspid annular plane systolic excursion (TAPSE) is 2.0 cm (normal >=1.7 cm).   3. Structurally normal mitral valve with normal leaflet excursion. There is calcification of the mitral valve annulus. There is mild leaflet calcification. There is mild to moderatemitral regurgitation.   4. The aortic valve anatomy cannot be determined. There is calcification of the aortic valve leaflets. The peak transaortic velocity is 3.51 m/s, peak transaortic gradient is 49.3 mmHg and mean transaortic gradient is 26.0 mmHg with an LVOT/aortic valve VTI ratio of 0.25. The gross appearance of the aortic valve is consistent with significant aortic stenosis. However, unable to accurately estimate the aortic valve area. There is mild aortic regurgitation.   5. The left atrium is moderately dilated with an indexed volume of 45.18 ml/m².   6. The tricuspid valve is structurally normal with normal leaflet excursion. There is mild tricuspid regurgitation. Estimated pulmonary artery systolic pressure is 42 mmHg, consistent with mild pulmonary hypertension.   7. Left pleural effusion noted.   8. No prior echocardiogram is available for comparison.    ____________________________________________________________________  Recommendations:  Consider JEAN for further evaluation of the aortic valve, if clinically indicated.     ==========================================================    < from: TTE Limited W or WO Ultrasound Enhancing Agent (12.23.24 @ 09:44) >     CONCLUSIONS:      1. No echocardiographic evidence of vegetations.   2. Moderate mitral regurgitation.   3. Mild aortic regurgitation.   4. Trace pulmonic regurgitation.   5. Trace tricuspid regurgitation.   6. Trileaflet aortic valve with reduced systolic excursion. There is calcification of the aortic valve leaflets.   7. Compared to the transthoracic echocardiogram performed on 6/11/2024, there have been no significant interval changes.    =================================================================    < from: TTE Limited W or WO Ultrasound Enhancing Agent (06.09.25 @ 16:38) >     CONCLUSIONS:      1. The inferior vena cava is normal in size measuring 1.73 cm in diameter, (normal <2.1cm) with normal inspiratory collapse (normal >50%) consistent with normal right atrial pressure (~3, range 0-5mmHg).    ________________________________________________________________________________________    Telemetry:  Sinus Rhythm

## 2025-06-12 NOTE — PROGRESS NOTE ADULT - NS ATTEND OPT1 GEN_ALL_CORE
I independently performed the documented:
I attest my time as attending is greater than 50% of the total combined time spent on qualifying patient care activities by the PA/NP and attending.
I attest my time as attending is greater than 50% of the total combined time spent on qualifying patient care activities by the PA/NP and attending.
I independently performed the documented:

## 2025-06-12 NOTE — PROGRESS NOTE ADULT - TIME BILLING
minutes spent on total encounter; more than 50% of the visit was spent counseling and / or coordinating care by the attending physician.  The necessity of the time spent during the encounter on this date of service was due to:     review of laboratory data, radiology results, consultants' recommendations, documentation in [place], discussion with patient/ACP and interdisciplinary staff (such as , social workers, etc). Interventions were performed as documented above.
- Ordering, reviewing, and interpreting labs, testing, and imaging  - Independently obtaining a review of systems and performing a physical exam  - Reviewing consultant documentation/recommendations in addition to discussing plan of care with consultants  - Counselling and educating patient and family regarding interpretation of aforementioned items and plan of care  - Documentation of encounter
minutes spent on total encounter; more than 50% of the visit was spent counseling and / or coordinating care by the attending physician.  The necessity of the time spent during the encounter on this date of service was due to:     review of laboratory data, radiology results, consultants' recommendations, documentation in [place], discussion with patient/ACP and interdisciplinary staff (such as , social workers, etc). Interventions were performed as documented above.
- Ordering, reviewing, and interpreting labs, testing, and imaging  - Independently obtaining a review of systems and performing a physical exam  - Reviewing consultant documentation/recommendations in addition to discussing plan of care with consultants  - Counselling and educating patient regarding interpretation of aforementioned items and plan of care  - Documentation of encounter
- Review of chart and consultation notes  - Exam and discussion with patient  - Review of laboratory and imaging   - Establishing a care plan for patient  - Communication with other providers
- Ordering, reviewing, and interpreting labs, testing, and imaging  - Independently obtaining a review of systems and performing a physical exam  - Reviewing consultant documentation/recommendations in addition to discussing plan of care with consultants  - Counselling and educating patient regarding interpretation of aforementioned items and plan of care  - Documentation of encounter
- Review of chart and consultation notes  - Exam and discussion with patient  - Review of laboratory and imaging   - Establishing a care plan for patient  - Communication with other providers
- Review of chart and consultation notes  - Exam and discussion with patient  - Review of laboratory and imaging   - Establishing a care plan for patient  - Communication with other providers

## 2025-06-12 NOTE — PROGRESS NOTE ADULT - PROBLEM SELECTOR PLAN 1
NS 50 ml/hr x 5 hrs   Toprol 12.5mg daily   Hydralazine 75 mg Q8H (hold SBP<90)  ISDN 10mg Q8H (hold SBP<90)  Strict I/O  Daily Standing Weights  Monitor Lytes Replete K>4.0 and Mg>2.0  Trend SCr (downtrending)  Management per Primary team  Appreciate Nephrology Recommendations  Plan for ongoing TAVR workup as OP  Pending Final Recommendations from HF Attending NS 50 ml/hr x 5 hrs   Toprol 12.5mg daily   Hydralazine 75 mg Q8H (hold SBP<90)  ISDN 10mg Q8H (hold SBP<90)  Strict I/O  Daily Standing Weights  Monitor Lytes Replete K>4.0 and Mg>2.0  Trend SCr (downtrending)  Management per Primary team  Appreciate Nephrology Recommendations  Heme/Onc consult   Plan for ongoing TAVR workup as OP  Pending Final Recommendations from HF Attending

## 2025-06-13 ENCOUNTER — TRANSCRIPTION ENCOUNTER (OUTPATIENT)
Age: 85
End: 2025-06-13

## 2025-06-13 VITALS
TEMPERATURE: 98 F | HEART RATE: 79 BPM | DIASTOLIC BLOOD PRESSURE: 59 MMHG | OXYGEN SATURATION: 94 % | SYSTOLIC BLOOD PRESSURE: 115 MMHG | RESPIRATION RATE: 18 BRPM

## 2025-06-13 LAB
ALBUMIN SERPL ELPH-MCNC: 3.4 G/DL — SIGNIFICANT CHANGE UP (ref 3.3–5)
ALP SERPL-CCNC: 71 U/L — SIGNIFICANT CHANGE UP (ref 40–120)
ALT FLD-CCNC: 9 U/L — SIGNIFICANT CHANGE UP (ref 4–41)
ANION GAP SERPL CALC-SCNC: 17 MMOL/L — HIGH (ref 7–14)
AST SERPL-CCNC: 14 U/L — SIGNIFICANT CHANGE UP (ref 4–40)
BASOPHILS # BLD AUTO: 0.01 K/UL — SIGNIFICANT CHANGE UP (ref 0–0.2)
BASOPHILS NFR BLD AUTO: 0.4 % — SIGNIFICANT CHANGE UP (ref 0–2)
BILIRUB SERPL-MCNC: 0.8 MG/DL — SIGNIFICANT CHANGE UP (ref 0.2–1.2)
BUN SERPL-MCNC: 68 MG/DL — HIGH (ref 7–23)
CALCIUM SERPL-MCNC: 8 MG/DL — LOW (ref 8.4–10.5)
CHLORIDE SERPL-SCNC: 102 MMOL/L — SIGNIFICANT CHANGE UP (ref 98–107)
CO2 SERPL-SCNC: 22 MMOL/L — SIGNIFICANT CHANGE UP (ref 22–31)
CREAT SERPL-MCNC: 2.68 MG/DL — HIGH (ref 0.5–1.3)
EGFR: 23 ML/MIN/1.73M2 — LOW
EGFR: 23 ML/MIN/1.73M2 — LOW
EOSINOPHIL # BLD AUTO: 0.05 K/UL — SIGNIFICANT CHANGE UP (ref 0–0.5)
EOSINOPHIL NFR BLD AUTO: 2.2 % — SIGNIFICANT CHANGE UP (ref 0–6)
GLUCOSE BLDC GLUCOMTR-MCNC: 127 MG/DL — HIGH (ref 70–99)
GLUCOSE BLDC GLUCOMTR-MCNC: 141 MG/DL — HIGH (ref 70–99)
GLUCOSE BLDC GLUCOMTR-MCNC: 150 MG/DL — HIGH (ref 70–99)
GLUCOSE SERPL-MCNC: 110 MG/DL — HIGH (ref 70–99)
HCT VFR BLD CALC: 26.4 % — LOW (ref 39–50)
HGB BLD-MCNC: 8.5 G/DL — LOW (ref 13–17)
IANC: 1.43 K/UL — LOW (ref 1.8–7.4)
IMM GRANULOCYTES NFR BLD AUTO: 0.4 % — SIGNIFICANT CHANGE UP (ref 0–0.9)
LYMPHOCYTES # BLD AUTO: 0.71 K/UL — LOW (ref 1–3.3)
LYMPHOCYTES # BLD AUTO: 31.1 % — SIGNIFICANT CHANGE UP (ref 13–44)
MAGNESIUM SERPL-MCNC: 2.5 MG/DL — SIGNIFICANT CHANGE UP (ref 1.6–2.6)
MCHC RBC-ENTMCNC: 32.2 G/DL — SIGNIFICANT CHANGE UP (ref 32–36)
MCHC RBC-ENTMCNC: 35 PG — HIGH (ref 27–34)
MCV RBC AUTO: 108.6 FL — HIGH (ref 80–100)
MONOCYTES # BLD AUTO: 0.07 K/UL — SIGNIFICANT CHANGE UP (ref 0–0.9)
MONOCYTES NFR BLD AUTO: 3.1 % — SIGNIFICANT CHANGE UP (ref 2–14)
NEUTROPHILS # BLD AUTO: 1.43 K/UL — LOW (ref 1.8–7.4)
NEUTROPHILS NFR BLD AUTO: 62.8 % — SIGNIFICANT CHANGE UP (ref 43–77)
NRBC # BLD AUTO: 0 K/UL — SIGNIFICANT CHANGE UP (ref 0–0)
NRBC # FLD: 0 K/UL — SIGNIFICANT CHANGE UP (ref 0–0)
NRBC BLD AUTO-RTO: 0 /100 WBCS — SIGNIFICANT CHANGE UP (ref 0–0)
PHOSPHATE SERPL-MCNC: 5.3 MG/DL — HIGH (ref 2.5–4.5)
PLATELET # BLD AUTO: 127 K/UL — LOW (ref 150–400)
POTASSIUM SERPL-MCNC: 3.7 MMOL/L — SIGNIFICANT CHANGE UP (ref 3.5–5.3)
POTASSIUM SERPL-SCNC: 3.7 MMOL/L — SIGNIFICANT CHANGE UP (ref 3.5–5.3)
PROT SERPL-MCNC: 5.9 G/DL — LOW (ref 6–8.3)
RBC # BLD: 2.43 M/UL — LOW (ref 4.2–5.8)
RBC # FLD: 24.7 % — HIGH (ref 10.3–14.5)
SODIUM SERPL-SCNC: 141 MMOL/L — SIGNIFICANT CHANGE UP (ref 135–145)
WBC # BLD: 2.28 K/UL — LOW (ref 3.8–10.5)
WBC # FLD AUTO: 2.28 K/UL — LOW (ref 3.8–10.5)

## 2025-06-13 PROCEDURE — 99239 HOSP IP/OBS DSCHRG MGMT >30: CPT

## 2025-06-13 RX ORDER — METOPROLOL SUCCINATE 50 MG/1
0.5 TABLET, EXTENDED RELEASE ORAL
Qty: 15 | Refills: 0
Start: 2025-06-13 | End: 2025-07-12

## 2025-06-13 RX ORDER — FUROSEMIDE 10 MG/ML
1 INJECTION INTRAMUSCULAR; INTRAVENOUS
Qty: 30 | Refills: 0
Start: 2025-06-13 | End: 2025-07-12

## 2025-06-13 RX ORDER — SENNA 187 MG
2 TABLET ORAL
Qty: 0 | Refills: 0 | DISCHARGE
Start: 2025-06-13

## 2025-06-13 RX ORDER — ISOSORBDIE DINITRATE 30 MG/1
1 TABLET ORAL
Qty: 90 | Refills: 0
Start: 2025-06-13 | End: 2025-07-12

## 2025-06-13 RX ORDER — EPOETIN ALFA 10000 [IU]/ML
10000 SOLUTION INTRAVENOUS; SUBCUTANEOUS ONCE
Refills: 0 | Status: COMPLETED | OUTPATIENT
Start: 2025-06-13 | End: 2025-06-13

## 2025-06-13 RX ORDER — APIXABAN 2.5 MG/1
1 TABLET, FILM COATED ORAL
Qty: 60 | Refills: 0
Start: 2025-06-13 | End: 2025-07-12

## 2025-06-13 RX ORDER — CLOPIDOGREL BISULFATE 75 MG/1
1 TABLET, FILM COATED ORAL
Qty: 30 | Refills: 0
Start: 2025-06-13 | End: 2025-07-12

## 2025-06-13 RX ORDER — GABAPENTIN 400 MG/1
1 CAPSULE ORAL
Qty: 90 | Refills: 0
Start: 2025-06-13 | End: 2025-07-12

## 2025-06-13 RX ORDER — POLYETHYLENE GLYCOL 3350 17 G/17G
17 POWDER, FOR SOLUTION ORAL
Qty: 0 | Refills: 0 | DISCHARGE
Start: 2025-06-13

## 2025-06-13 RX ORDER — ACETAMINOPHEN 500 MG/5ML
2 LIQUID (ML) ORAL
Qty: 0 | Refills: 0 | DISCHARGE
Start: 2025-06-13

## 2025-06-13 RX ADMIN — EPOETIN ALFA 10000 UNIT(S): 10000 SOLUTION INTRAVENOUS; SUBCUTANEOUS at 17:39

## 2025-06-13 RX ADMIN — ISOSORBDIE DINITRATE 10 MILLIGRAM(S): 30 TABLET ORAL at 06:14

## 2025-06-13 RX ADMIN — Medication 75 MILLIGRAM(S): at 14:37

## 2025-06-13 RX ADMIN — Medication 75 MILLIGRAM(S): at 06:12

## 2025-06-13 RX ADMIN — GABAPENTIN 300 MILLIGRAM(S): 400 CAPSULE ORAL at 17:38

## 2025-06-13 RX ADMIN — METOPROLOL SUCCINATE 12.5 MILLIGRAM(S): 50 TABLET, EXTENDED RELEASE ORAL at 06:13

## 2025-06-13 RX ADMIN — Medication 667 MILLIGRAM(S): at 17:38

## 2025-06-13 RX ADMIN — Medication 650 MILLIGRAM(S): at 06:12

## 2025-06-13 RX ADMIN — Medication 667 MILLIGRAM(S): at 12:50

## 2025-06-13 RX ADMIN — Medication 650 MILLIGRAM(S): at 07:12

## 2025-06-13 RX ADMIN — GABAPENTIN 300 MILLIGRAM(S): 400 CAPSULE ORAL at 06:12

## 2025-06-13 RX ADMIN — Medication 1 APPLICATION(S): at 06:13

## 2025-06-13 RX ADMIN — ISOSORBDIE DINITRATE 10 MILLIGRAM(S): 30 TABLET ORAL at 17:38

## 2025-06-13 RX ADMIN — Medication 40 MILLIGRAM(S): at 06:13

## 2025-06-13 RX ADMIN — APIXABAN 2.5 MILLIGRAM(S): 2.5 TABLET, FILM COATED ORAL at 06:13

## 2025-06-13 RX ADMIN — Medication 667 MILLIGRAM(S): at 08:59

## 2025-06-13 RX ADMIN — CLOPIDOGREL BISULFATE 75 MILLIGRAM(S): 75 TABLET, FILM COATED ORAL at 12:49

## 2025-06-13 RX ADMIN — APIXABAN 2.5 MILLIGRAM(S): 2.5 TABLET, FILM COATED ORAL at 17:39

## 2025-06-13 RX ADMIN — ISOSORBDIE DINITRATE 10 MILLIGRAM(S): 30 TABLET ORAL at 10:32

## 2025-06-13 RX ADMIN — Medication 200 MILLIGRAM(S): at 12:49

## 2025-06-13 NOTE — PROGRESS NOTE ADULT - SUBJECTIVE AND OBJECTIVE BOX
Ms Killian returns for follow-up.  In the interim she underwent upper GI endoscopy.  I reviewed the results of these studies with her.  She did also complete her Holter monitor which showed very brief nonsustained episodes of atrial tachycardia which were not associated with symptoms.  All in all she feels significantly improved with much less and dizziness symptoms than before.  We discussed about any further need for evaluation and decided on a watchful waiting approach at this juncture.  I gave her information materials on my card and encouraged her to call me if there are any recurrence of symptoms down the road at which time we can investigate further.  Discussed in detail.  Questions answered.   NEPHROLOGY     Patient seen and examined resting comfortably on room air, no new complaints, in no acute distress.     MEDICATIONS  (STANDING):  allopurinol 200 milliGRAM(s) Oral daily  apixaban      apixaban 2.5 milliGRAM(s) Oral two times a day  calcium acetate 667 milliGRAM(s) Oral three times a day with meals  chlorhexidine 2% Cloths 1 Application(s) Topical <User Schedule>  clopidogrel Tablet 75 milliGRAM(s) Oral daily  dextrose 5%. 1000 milliLiter(s) (100 mL/Hr) IV Continuous <Continuous>  dextrose 5%. 1000 milliLiter(s) (50 mL/Hr) IV Continuous <Continuous>  dextrose 50% Injectable 25 Gram(s) IV Push once  dextrose 50% Injectable 12.5 Gram(s) IV Push once  dextrose 50% Injectable 25 Gram(s) IV Push once  gabapentin 300 milliGRAM(s) Oral two times a day  glucagon  Injectable 1 milliGRAM(s) IntraMuscular once  hydrALAZINE 75 milliGRAM(s) Oral three times a day  insulin glargine Injectable (LANTUS) 10 Unit(s) SubCutaneous at bedtime  insulin lispro (ADMELOG) corrective regimen sliding scale   SubCutaneous three times a day before meals  insulin lispro (ADMELOG) corrective regimen sliding scale   SubCutaneous at bedtime  isosorbide   dinitrate Tablet (ISORDIL) 10 milliGRAM(s) Oral three times a day  levoFLOXacin  Tablet 250 milliGRAM(s) Oral every 24 hours  metoprolol succinate ER 12.5 milliGRAM(s) Oral daily  pantoprazole    Tablet 40 milliGRAM(s) Oral before breakfast  polyethylene glycol 3350 17 Gram(s) Oral daily  rosuvastatin 20 milliGRAM(s) Oral at bedtime  senna 2 Tablet(s) Oral at bedtime  sodium chloride 0.9%. 1000 milliLiter(s) (50 mL/Hr) IV Continuous <Continuous>    VITALS:  T(C): , Max: 36.8 (06-13-25 @ 05:56)  T(F): , Max: 98.3 (06-13-25 @ 05:56)  HR: 89 (06-13-25 @ 05:56)  BP: 124/58 (06-13-25 @ 05:56)  RR: 18 (06-13-25 @ 05:56)  SpO2: 98% (06-13-25 @ 05:56)    PHYSICAL EXAM:  Constitutional: NAD  HEENT: PERRLA, EOMI,  MMM  Neck: No LAD, No JVD  Respiratory: diminished .  Cardiovascular: S1 and S2  Gastrointestinal: BS+, soft, NT/ND  Extremities: No peripheral edema  Neurological: A/O x 3, no focal deficits  Psychiatric: Normal mood, normal affect  : no Min  Skin: No rashes    LABS:                        8.5    2.28  )-----------( 127      ( 13 Jun 2025 06:15 )             26.4     06-12    142  |  102  |  72[H]  ----------------------------<  150[H]  4.1   |  24  |  2.69[H]    Ca    7.6[L]      12 Jun 2025 06:20  Phos  5.0     06-12  Mg     2.40     06-12    TPro  5.9[L]  /  Alb  3.4  /  TBili  0.8  /  DBili  x   /  AST  16  /  ALT  9   /  AlkPhos  74  06-12    RADIOLOGY & ADDITIONAL STUDIES:  ad< from: CT Chest No Cont (06.12.25 @ 12:08) >    IMPRESSION: Small bilateral pleural effusions.    Probable fracture through the body of T9 vertebral body. This finding is   new when compared to previous exam. Further evaluation with MRI of the   thoracic spine is recommended.    --- End of Report ---    RACHELL MORRIS MD; Attending Radiologist  This document has been electronically signed. Jun 12 2025 12:30PM    < end of copied text >      ASSESSMENT/PLAN:   83 Y/O M PMH MDS, HTN, CKD 3, A Fib, Anemia, HTN, Prostate CA, gout, obesity, presents with 3 days of orthopnea, increasing shortness of breath and weight gain  CHF   CKD stage 4   Anemia, thrombocytopenia, leukopenia - Pancytopenia   Severe AS   Hyperphosphatemia  - improving   Hypocalcemia     1 Renal- creatinine improving/stable, now off bumex   Trend serum creatinine and strict ins and outs   Encourage po intake   Continue calcium acetate 667 po tid - this will likely not be needed as outpt   2 CVS- BP stable, s/p nitro gtt, now on Toprol 12.5 mg po daily, Hydralazine 75mg po tid and Isordil 10 mg po tid   TTE revealed severe AS; no further TAVR workup as per cards not TAVR candidate  3 Pulm- now off bipap and off nasal cannula   4 Heme - Hgb stable, Serial cbc  5 Ortho - Probable fracture through the body of T9 vertebral body, MRI spine pending      NEPHROLOGY     Patient seen and examined resting comfortably on room air, no new complaints, in no acute distress.     MEDICATIONS  (STANDING):  allopurinol 200 milliGRAM(s) Oral daily.  apixaban      apixaban 2.5 milliGRAM(s) Oral two times a day  calcium acetate 667 milliGRAM(s) Oral three times a day with meals  chlorhexidine 2% Cloths 1 Application(s) Topical <User Schedule>  clopidogrel Tablet 75 milliGRAM(s) Oral daily  dextrose 5%. 1000 milliLiter(s) (100 mL/Hr) IV Continuous <Continuous>  dextrose 5%. 1000 milliLiter(s) (50 mL/Hr) IV Continuous <Continuous>  dextrose 50% Injectable 25 Gram(s) IV Push once  dextrose 50% Injectable 12.5 Gram(s) IV Push once  dextrose 50% Injectable 25 Gram(s) IV Push once.  gabapentin 300 milliGRAM(s) Oral two times a day  glucagon  Injectable 1 milliGRAM(s) IntraMuscular once  hydrALAZINE 75 milliGRAM(s) Oral three times a day  insulin glargine Injectable (LANTUS) 10 Unit(s) SubCutaneous at bedtime  insulin lispro (ADMELOG) corrective regimen sliding scale   SubCutaneous three times a day before meals  insulin lispro (ADMELOG) corrective regimen sliding scale   SubCutaneous at bedtime  isosorbide   dinitrate Tablet (ISORDIL) 10 milliGRAM(s) Oral three times a day  levoFLOXacin  Tablet 250 milliGRAM(s) Oral every 24 hours  metoprolol succinate ER 12.5 milliGRAM(s) Oral daily  pantoprazole    Tablet 40 milliGRAM(s) Oral before breakfast  polyethylene glycol 3350 17 Gram(s) Oral daily  rosuvastatin 20 milliGRAM(s) Oral at bedtime  senna 2 Tablet(s) Oral at bedtime  sodium chloride 0.9%. 1000 milliLiter(s) (50 mL/Hr) IV Continuous <Continuous>    VITALS:  T(C): , Max: 36.8 (06-13-25 @ 05:56)  T(F): , Max: 98.3 (06-13-25 @ 05:56)  HR: 89 (06-13-25 @ 05:56)  BP: 124/58 (06-13-25 @ 05:56)  RR: 18 (06-13-25 @ 05:56)  SpO2: 98% (06-13-25 @ 05:56)    PHYSICAL EXAM:  Constitutional: NAD  HEENT: PERRLA, EOMI,  MMM  Neck: No LAD, No JVD  Respiratory: diminished .  Cardiovascular: S1 and S2  Gastrointestinal: BS+, soft, NT/ND  Extremities: No peripheral edema  Neurological: A/O x 3, no focal deficits  Psychiatric: Normal mood, normal affect  : no Min  Skin: No rashes    LABS:                        8.5    2.28  )-----------( 127      ( 13 Jun 2025 06:15 )             26.4     06-12    142  |  102  |  72[H]  ----------------------------<  150[H]  4.1   |  24  |  2.69[H]    Ca    7.6[L]      12 Jun 2025 06:20  Phos  5.0     06-12  Mg     2.40     06-12    TPro  5.9[L]  /  Alb  3.4  /  TBili  0.8  /  DBili  x   /  AST  16  /  ALT  9   /  AlkPhos  74  06-12    RADIOLOGY & ADDITIONAL STUDIES:  ad< from: CT Chest No Cont (06.12.25 @ 12:08) >    IMPRESSION: Small bilateral pleural effusions.    Probable fracture through the body of T9 vertebral body. This finding is   new when compared to previous exam. Further evaluation with MRI of the   thoracic spine is recommended.    --- End of Report ---    RACHELL MORRIS MD; Attending Radiologist  This document has been electronically signed. Jun 12 2025 12:30PM    < end of copied text >

## 2025-06-13 NOTE — PROGRESS NOTE ADULT - SUBJECTIVE AND OBJECTIVE BOX
Patient is a 84y old  Male who presents with a chief complaint of Fluid overload (13 Jun 2025 09:12)      INTERVAL HPI/OVERNIGHT EVENTS:  Seen by me this morning, laying in bed, comfortable, aide at bedside (Preeti), no SOB or chest pain, tolerating PO.    Review of Systems: 12 point review of systems otherwise negative    MEDICATIONS  (STANDING):  allopurinol 200 milliGRAM(s) Oral daily  apixaban      apixaban 2.5 milliGRAM(s) Oral two times a day  calcium acetate 667 milliGRAM(s) Oral three times a day with meals  chlorhexidine 2% Cloths 1 Application(s) Topical <User Schedule>  clopidogrel Tablet 75 milliGRAM(s) Oral daily  dextrose 5%. 1000 milliLiter(s) (100 mL/Hr) IV Continuous <Continuous>  dextrose 5%. 1000 milliLiter(s) (50 mL/Hr) IV Continuous <Continuous>  dextrose 50% Injectable 25 Gram(s) IV Push once  dextrose 50% Injectable 12.5 Gram(s) IV Push once  dextrose 50% Injectable 25 Gram(s) IV Push once  epoetin hany-epbx (RETACRIT) Injectable 65315 Unit(s) SubCutaneous once  gabapentin 300 milliGRAM(s) Oral two times a day  glucagon  Injectable 1 milliGRAM(s) IntraMuscular once  hydrALAZINE 75 milliGRAM(s) Oral three times a day  insulin glargine Injectable (LANTUS) 10 Unit(s) SubCutaneous at bedtime  insulin lispro (ADMELOG) corrective regimen sliding scale   SubCutaneous three times a day before meals  insulin lispro (ADMELOG) corrective regimen sliding scale   SubCutaneous at bedtime  isosorbide   dinitrate Tablet (ISORDIL) 10 milliGRAM(s) Oral three times a day  levoFLOXacin  Tablet 250 milliGRAM(s) Oral every 24 hours  metoprolol succinate ER 12.5 milliGRAM(s) Oral daily  pantoprazole    Tablet 40 milliGRAM(s) Oral before breakfast  polyethylene glycol 3350 17 Gram(s) Oral daily  rosuvastatin 20 milliGRAM(s) Oral at bedtime  senna 2 Tablet(s) Oral at bedtime  sodium chloride 0.9%. 1000 milliLiter(s) (50 mL/Hr) IV Continuous <Continuous>    MEDICATIONS  (PRN):  acetaminophen     Tablet .. 650 milliGRAM(s) Oral every 6 hours PRN Moderate Pain (4 - 6)  dextrose Oral Gel 15 Gram(s) Oral once PRN Blood Glucose LESS THAN 70 milliGRAM(s)/deciliter  melatonin 3 milliGRAM(s) Oral at bedtime PRN Insomnia      Allergies    No Known Allergies    Intolerances          Vital Signs Last 24 Hrs  T(C): 36.7 (13 Jun 2025 14:36), Max: 36.8 (13 Jun 2025 05:56)  T(F): 98.1 (13 Jun 2025 14:36), Max: 98.3 (13 Jun 2025 05:56)  HR: 69 (13 Jun 2025 14:36) (69 - 89)  BP: 127/63 (13 Jun 2025 14:36) (106/49 - 127/63)  BP(mean): --  RR: 16 (13 Jun 2025 14:36) (16 - 18)  SpO2: 98% (13 Jun 2025 14:36) (95% - 100%)    Parameters below as of 13 Jun 2025 14:36  Patient On (Oxygen Delivery Method): room air      CAPILLARY BLOOD GLUCOSE      POCT Blood Glucose.: 141 mg/dL (13 Jun 2025 12:40)  POCT Blood Glucose.: 127 mg/dL (13 Jun 2025 08:33)  POCT Blood Glucose.: 144 mg/dL (12 Jun 2025 21:51)  POCT Blood Glucose.: 131 mg/dL (12 Jun 2025 17:38)        Physical Exam:    Daily     Daily   General:  Well appearing, NAD, not cachetic  HEENT:  Nonicteric, PERRLA  CV:  RRR, no murmur, no JVD  Lungs:  CTA B/L, no wheezes, rales, rhonchi  Abdomen:  Soft, non-tender, no distended, positive BS  Extremities:  2+ pulses, no c/c, no edema  Skin:  Warm and dry, no rashes  :  No sandhu  Neuro:  AAOx3, non-focal, CN II-XII grossly intact  No Restraints    LABS:                        8.5    2.28  )-----------( 127      ( 13 Jun 2025 06:15 )             26.4     06-13    141  |  102  |  68[H]  ----------------------------<  110[H]  3.7   |  22  |  2.68[H]    Ca    8.0[L]      13 Jun 2025 06:15  Phos  5.3     06-13  Mg     2.50     06-13    TPro  5.9[L]  /  Alb  3.4  /  TBili  0.8  /  DBili  x   /  AST  14  /  ALT  9   /  AlkPhos  71  06-13      Urinalysis Basic - ( 13 Jun 2025 06:15 )    Color: x / Appearance: x / SG: x / pH: x  Gluc: 110 mg/dL / Ketone: x  / Bili: x / Urobili: x   Blood: x / Protein: x / Nitrite: x   Leuk Esterase: x / RBC: x / WBC x   Sq Epi: x / Non Sq Epi: x / Bacteria: x          RADIOLOGY & ADDITIONAL TESTS:  Reviewed by me

## 2025-06-13 NOTE — PROGRESS NOTE ADULT - PROVIDER SPECIALTY LIST ADULT
CCU
CCU
Heart Failure
Nephrology
Heart Failure
Heart Failure
Hospitalist
Hospitalist
Nephrology
Nephrology
CCU
Heart Failure
Heart Failure
Hospitalist
Intervent Cardiology
Intervent Cardiology
Nephrology
Intervent Cardiology
Heart Failure
Hospitalist
Hospitalist
Heart Failure
Heart Failure
Hospitalist
Hospitalist
detailed exam

## 2025-06-13 NOTE — DISCHARGE NOTE NURSING/CASE MANAGEMENT/SOCIAL WORK - FINANCIAL ASSISTANCE
[facility] provides services at a reduced cost to those who are determined to be eligible through [facility]’s financial assistance program. Information regarding [facility]’s financial assistance program can be found by going to [email or web address] or by calling [phone].

## 2025-06-13 NOTE — DISCHARGE NOTE NURSING/CASE MANAGEMENT/SOCIAL WORK - NSSCNAMETXT_GEN_ALL_CORE
[facility]

## 2025-06-13 NOTE — DISCHARGE NOTE NURSING/CASE MANAGEMENT/SOCIAL WORK - PATIENT PORTAL LINK FT
You can access the FollowMyHealth Patient Portal offered by [facility] by registering at the following website: [email or web address]. By joining [facility]’s FollowMyHealth portal, you will also be able to view your health information using other applications (apps) compatible with our system.
normal...

## 2025-06-13 NOTE — PROGRESS NOTE ADULT - ASSESSMENT
84-year-old male with a past medical history of myelodysplastic syndrome (MDS), hypertension (HTN), chronic kidney disease stage 3 (CKD 3), atrial fibrillation (A Fib), anemia, prostate cancer, gout, and obesity, presented with acute hypoxic respiratory failure requiring BiPAP secondary to an acute exacerbation of heart failure with reduced ejection fraction (HFrEF). He was transferred to the coronary care unit (CCU) and diuresed. He is now stabilized and has been transferred back to the medical floor.    Active Problems:    Myelodysplastic Syndrome (MDS)  Acute on chronic heart failure with reduced ejection fraction (HFrEF)  Acute hypoxic respiratory failure  Severe Aortic Stenosis  Chronic kidney disease stage 3 (CKD 3)  Atrial fibrillation with rapid ventricular response (A Fib with RVR)/pAFib  Hypercoagulable state  Anemia  Hypertension (HTN)  Prostate cancer  Gout  Steroid induced hyperglycemia  Obesity  Immunosuppressed status    MDS: History of MDS;   Last chemotherapy on May 20th. 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 Per outpatient chart, anemia is also multifactorial (anemia of chronic disease/anemia of renal failure). Patient takes levofloxacin for neutropenia. Continue outpatient follow-up.    Acute on Chronic HFrEF/Acute Hypoxic Respiratory Failure  TTE revealed severe aortic stenosis (AS), repeat limited TTE 6/9 w/ normal RA pressure  C/w Hydralazine 75mg every 8 hours, C/w Isosorbide dinitrate (ISDN) 10mg every 8 hours  Apprec HF recs, holding bumex at this time, s/p IVF's 6/10 due to low JVP/hypovolemia  Will ask HF/Renal diuretic recs for discharge (pt was on lasix 40mg QD as outpatient)  C/w Toprol 12.5 mg po qd  CXR 6/10: Bilateral pleural effusions unchanged  Per Interventional Cards, case was d/w with interventionalist, Dr Mendoza, deemed patient is not a TAVR candidate, recommending medical therapy at this time given patient is high risk for bleeding and increased risk for complications  - No further TAVR work-up at this time  - Continue clopidogrel (Plavix), statin. Strict intake and output monitoring. Daily standing weights. Monitor electrolytes; replete potassium to > 4.0 mEq/L and magnesium to > 2.0 mg/dL  - Initially on NC, weaned to RA with good O2 Saturation, will not need O2 on discharge  - CT Chest per Renal recs 6/12: Small bilateral pleural effusions.  - Passed TOV    Probable fracture through the body of T9 vertebral body  Seen on CT chest done 6/12  - F/up thoracic MRI for further evaluation  - Asymptomatic at this time    Steroid induced hyperglycemia  FS much improved  C/w lantus 10U qhs  C/w SAMIR for now    Acute on chronic CKD 3  Nephrology consulted. Recommendations appreciated  C/w calcium acetate 667 po tid   No renal objection for LHC/RHC  Cr improving off diuretics, 2.68 today  Monitor BMP    Hypercoagulable State/A Fib with RVR, pAFib  On apixaban (Eliquis)  Now on NSR    Anemia  Monitor as above (under MDS)    Hypertension  Continue medications as above for heart failure    PT eval --> NERI but pt wants to go back home with continued 24/7 care  Discussed with Dane Woodard at bedside on 6/10. Anticipate DC Home soon after Thoracic MRI has been done.

## 2025-06-13 NOTE — PROGRESS NOTE ADULT - ASSESSMENT
83 Y/O M PMH MDS, HTN, CKD 3, A Fib, Anemia, HTN, Prostate CA, gout, obesity, presents with 3 days of orthopnea, increasing shortness of breath and weight gain  CHF   CKD stage 4   Anemia, thrombocytopenia, leukopenia - Pancytopenia   Severe AS   Hyperphosphatemia  - improving   Hypocalcemia     1 Renal- creatinine improving/stable, now off bumex   Trend serum creatinine and strict ins and outs   Encourage po intake   Continue calcium acetate 667 po tid - Reduce to qd and only at dinner   2 CVS- BP stable, s/p nitro gtt, now on Toprol 12.5 mg po daily, Hydralazine 75mg po tid and Isordil 10 mg po tid   TTE revealed severe AS; no further TAVR workup as per cards not TAVR candidate  3 Pulm- now off bipap and off nasal cannula   4 Heme - Hgb stable,Retacrit 10000 x 1   5 Ortho - Probable fracture through the body of T9 vertebral body, MRI spine pending       Sayed He[employer] [facility]   [phone]

## 2025-06-13 NOTE — DISCHARGE NOTE NURSING/CASE MANAGEMENT/SOCIAL WORK - NSDCPEFALRISK_GEN_ALL_CORE
For information on Fall & Injury Prevention, visit: [email or web address] OR  [email or web address] OR  https://www.cdc.gov/steadi/patient.html

## 2025-06-13 NOTE — DISCHARGE NOTE NURSING/CASE MANAGEMENT/SOCIAL WORK - NSDCFUADDAPPT_GEN_ALL_CORE_FT
APPTS ARE READY TO BE MADE: [x] YES    Best Family or Patient Contact (if needed):    Additional Information about above appointments (if needed):    1: Dr. Gabriel Reddy (Cardiology) in 2 weeks to follow up heart failure  2:   3:     Other comments or requests:

## 2025-06-15 RX ORDER — ENOXAPARIN SODIUM 100 MG/ML
100 INJECTION SUBCUTANEOUS
Qty: 30 | Refills: 0
Start: 2025-06-15 | End: 2025-07-14

## 2025-06-19 ENCOUNTER — APPOINTMENT (OUTPATIENT)
Dept: CARDIOLOGY | Facility: CLINIC | Age: 85
End: 2025-06-19
Payer: MEDICARE

## 2025-06-19 VITALS — HEART RATE: 71 BPM | DIASTOLIC BLOOD PRESSURE: 74 MMHG | OXYGEN SATURATION: 97 % | SYSTOLIC BLOOD PRESSURE: 143 MMHG

## 2025-06-19 VITALS — DIASTOLIC BLOOD PRESSURE: 60 MMHG | SYSTOLIC BLOOD PRESSURE: 133 MMHG

## 2025-06-19 PROCEDURE — G2211 COMPLEX E/M VISIT ADD ON: CPT

## 2025-06-19 PROCEDURE — 99214 OFFICE O/P EST MOD 30 MIN: CPT

## 2025-06-19 RX ORDER — APIXABAN 2.5 MG/1
2.5 TABLET, FILM COATED ORAL
Qty: 60 | Refills: 0 | Status: ACTIVE | COMMUNITY
Start: 2025-06-16

## 2025-06-19 RX ORDER — ISOSORBIDE DINITRATE 10 MG/1
10 TABLET ORAL 3 TIMES DAILY
Qty: 270 | Refills: 1 | Status: ACTIVE | COMMUNITY
Start: 2025-06-19 | End: 1900-01-01

## 2025-06-19 RX ORDER — METOPROLOL SUCCINATE 25 MG/1
25 TABLET, EXTENDED RELEASE ORAL DAILY
Qty: 45 | Refills: 1 | Status: ACTIVE | COMMUNITY
Start: 2025-06-19 | End: 1900-01-01

## 2025-06-19 RX ORDER — CLOPIDOGREL BISULFATE 75 MG/1
75 TABLET, FILM COATED ORAL
Qty: 90 | Refills: 1 | Status: ACTIVE | COMMUNITY
Start: 2025-06-19 | End: 1900-01-01

## 2025-06-19 RX ORDER — HYDRALAZINE HYDROCHLORIDE 25 MG/1
25 TABLET ORAL 3 TIMES DAILY
Qty: 270 | Refills: 1 | Status: ACTIVE | COMMUNITY
Start: 2025-06-19 | End: 1900-01-01

## 2025-06-19 RX ORDER — ROSUVASTATIN CALCIUM 10 MG/1
10 TABLET, FILM COATED ORAL
Qty: 90 | Refills: 1 | Status: ACTIVE | COMMUNITY
Start: 2025-06-19 | End: 1900-01-01

## 2025-06-24 ENCOUNTER — APPOINTMENT (OUTPATIENT)
Dept: HEMATOLOGY ONCOLOGY | Facility: CLINIC | Age: 85
End: 2025-06-24
Payer: MEDICARE

## 2025-06-24 ENCOUNTER — RESULT REVIEW (OUTPATIENT)
Age: 85
End: 2025-06-24

## 2025-06-24 VITALS
RESPIRATION RATE: 16 BRPM | TEMPERATURE: 97.8 F | BODY MASS INDEX: 28.98 KG/M2 | HEART RATE: 73 BPM | DIASTOLIC BLOOD PRESSURE: 70 MMHG | WEIGHT: 185 LBS | OXYGEN SATURATION: 99 % | SYSTOLIC BLOOD PRESSURE: 149 MMHG

## 2025-06-24 LAB
BASOPHILS # BLD AUTO: 0.05 K/UL — SIGNIFICANT CHANGE UP (ref 0–0.2)
BASOPHILS NFR BLD AUTO: 1.3 % — SIGNIFICANT CHANGE UP (ref 0–2)
EOSINOPHIL # BLD AUTO: 0.08 K/UL — SIGNIFICANT CHANGE UP (ref 0–0.5)
EOSINOPHIL NFR BLD AUTO: 2.1 % — SIGNIFICANT CHANGE UP (ref 0–6)
HCT VFR BLD CALC: 28.4 % — LOW (ref 39–50)
HGB BLD-MCNC: 9.1 G/DL — LOW (ref 13–17)
IMM GRANULOCYTES NFR BLD AUTO: 1.6 % — HIGH (ref 0–0.9)
LYMPHOCYTES # BLD AUTO: 1 K/UL — SIGNIFICANT CHANGE UP (ref 1–3.3)
LYMPHOCYTES # BLD AUTO: 26.5 % — SIGNIFICANT CHANGE UP (ref 13–44)
MCHC RBC-ENTMCNC: 32 G/DL — SIGNIFICANT CHANGE UP (ref 32–36)
MCHC RBC-ENTMCNC: 33.6 PG — SIGNIFICANT CHANGE UP (ref 27–34)
MCV RBC AUTO: 104.8 FL — HIGH (ref 80–100)
MONOCYTES # BLD AUTO: 0.23 K/UL — SIGNIFICANT CHANGE UP (ref 0–0.9)
MONOCYTES NFR BLD AUTO: 6.1 % — SIGNIFICANT CHANGE UP (ref 2–14)
NEUTROPHILS # BLD AUTO: 2.36 K/UL — SIGNIFICANT CHANGE UP (ref 1.8–7.4)
NEUTROPHILS NFR BLD AUTO: 62.4 % — SIGNIFICANT CHANGE UP (ref 43–77)
NRBC BLD AUTO-RTO: 0 /100 WBCS — SIGNIFICANT CHANGE UP (ref 0–0)
PLATELET # BLD AUTO: 207 K/UL — SIGNIFICANT CHANGE UP (ref 150–400)
RBC # BLD: 2.71 M/UL — LOW (ref 4.2–5.8)
RBC # FLD: 23.1 % — HIGH (ref 10.3–14.5)
WBC # BLD: 3.78 K/UL — LOW (ref 3.8–10.5)
WBC # FLD AUTO: 3.78 K/UL — LOW (ref 3.8–10.5)

## 2025-06-24 PROCEDURE — 99214 OFFICE O/P EST MOD 30 MIN: CPT

## 2025-06-24 PROCEDURE — G2211 COMPLEX E/M VISIT ADD ON: CPT

## 2025-06-30 ENCOUNTER — RESULT REVIEW (OUTPATIENT)
Age: 85
End: 2025-06-30

## 2025-06-30 ENCOUNTER — APPOINTMENT (OUTPATIENT)
Dept: INFUSION THERAPY | Facility: HOSPITAL | Age: 85
End: 2025-06-30

## 2025-06-30 ENCOUNTER — APPOINTMENT (OUTPATIENT)
Dept: HEMATOLOGY ONCOLOGY | Facility: CLINIC | Age: 85
End: 2025-06-30

## 2025-06-30 LAB
ALBUMIN SERPL ELPH-MCNC: 3.9 G/DL — SIGNIFICANT CHANGE UP (ref 3.3–5)
ALP SERPL-CCNC: 97 U/L — SIGNIFICANT CHANGE UP (ref 40–120)
ALT FLD-CCNC: 7 U/L — LOW (ref 10–45)
ANION GAP SERPL CALC-SCNC: 15 MMOL/L — SIGNIFICANT CHANGE UP (ref 5–17)
AST SERPL-CCNC: 17 U/L — SIGNIFICANT CHANGE UP (ref 10–40)
BASOPHILS # BLD AUTO: 0.06 K/UL — SIGNIFICANT CHANGE UP (ref 0–0.2)
BASOPHILS NFR BLD AUTO: 1.4 % — SIGNIFICANT CHANGE UP (ref 0–2)
BILIRUB SERPL-MCNC: 0.5 MG/DL — SIGNIFICANT CHANGE UP (ref 0.2–1.2)
BUN SERPL-MCNC: 36 MG/DL — HIGH (ref 7–23)
CALCIUM SERPL-MCNC: 8.9 MG/DL — SIGNIFICANT CHANGE UP (ref 8.4–10.5)
CHLORIDE SERPL-SCNC: 100 MMOL/L — SIGNIFICANT CHANGE UP (ref 96–108)
CO2 SERPL-SCNC: 24 MMOL/L — SIGNIFICANT CHANGE UP (ref 22–31)
CREAT SERPL-MCNC: 2.66 MG/DL — HIGH (ref 0.5–1.3)
EGFR: 23 ML/MIN/1.73M2 — LOW
EGFR: 23 ML/MIN/1.73M2 — LOW
EOSINOPHIL # BLD AUTO: 0.23 K/UL — SIGNIFICANT CHANGE UP (ref 0–0.5)
EOSINOPHIL NFR BLD AUTO: 5.3 % — SIGNIFICANT CHANGE UP (ref 0–6)
GLUCOSE SERPL-MCNC: 116 MG/DL — HIGH (ref 70–99)
HCT VFR BLD CALC: 28.2 % — LOW (ref 39–50)
HGB BLD-MCNC: 9.5 G/DL — LOW (ref 13–17)
IMM GRANULOCYTES NFR BLD AUTO: 2.1 % — HIGH (ref 0–0.9)
LDH SERPL L TO P-CCNC: 187 U/L — SIGNIFICANT CHANGE UP (ref 50–242)
LYMPHOCYTES # BLD AUTO: 1.1 K/UL — SIGNIFICANT CHANGE UP (ref 1–3.3)
LYMPHOCYTES # BLD AUTO: 25.2 % — SIGNIFICANT CHANGE UP (ref 13–44)
MCHC RBC-ENTMCNC: 33.7 G/DL — SIGNIFICANT CHANGE UP (ref 32–36)
MCHC RBC-ENTMCNC: 34.9 PG — HIGH (ref 27–34)
MCV RBC AUTO: 103.7 FL — HIGH (ref 80–100)
MONOCYTES # BLD AUTO: 0.38 K/UL — SIGNIFICANT CHANGE UP (ref 0–0.9)
MONOCYTES NFR BLD AUTO: 8.7 % — SIGNIFICANT CHANGE UP (ref 2–14)
NEUTROPHILS # BLD AUTO: 2.51 K/UL — SIGNIFICANT CHANGE UP (ref 1.8–7.4)
NEUTROPHILS NFR BLD AUTO: 57.3 % — SIGNIFICANT CHANGE UP (ref 43–77)
NRBC BLD AUTO-RTO: 0 /100 WBCS — SIGNIFICANT CHANGE UP (ref 0–0)
PLATELET # BLD AUTO: 183 K/UL — SIGNIFICANT CHANGE UP (ref 150–400)
POTASSIUM SERPL-MCNC: 3.7 MMOL/L — SIGNIFICANT CHANGE UP (ref 3.5–5.3)
POTASSIUM SERPL-SCNC: 3.7 MMOL/L — SIGNIFICANT CHANGE UP (ref 3.5–5.3)
PROT SERPL-MCNC: 6.5 G/DL — SIGNIFICANT CHANGE UP (ref 6–8.3)
RBC # BLD: 2.72 M/UL — LOW (ref 4.2–5.8)
RBC # FLD: 22.4 % — HIGH (ref 10.3–14.5)
SODIUM SERPL-SCNC: 138 MMOL/L — SIGNIFICANT CHANGE UP (ref 135–145)
WBC # BLD: 4.37 K/UL — SIGNIFICANT CHANGE UP (ref 3.8–10.5)
WBC # FLD AUTO: 4.37 K/UL — SIGNIFICANT CHANGE UP (ref 3.8–10.5)

## 2025-07-01 ENCOUNTER — APPOINTMENT (OUTPATIENT)
Dept: INFUSION THERAPY | Facility: HOSPITAL | Age: 85
End: 2025-07-01

## 2025-07-01 ENCOUNTER — APPOINTMENT (OUTPATIENT)
Dept: HEMATOLOGY ONCOLOGY | Facility: CLINIC | Age: 85
End: 2025-07-01

## 2025-07-02 ENCOUNTER — RESULT REVIEW (OUTPATIENT)
Age: 85
End: 2025-07-02

## 2025-07-02 ENCOUNTER — APPOINTMENT (OUTPATIENT)
Dept: HEMATOLOGY ONCOLOGY | Facility: CLINIC | Age: 85
End: 2025-07-02

## 2025-07-02 ENCOUNTER — APPOINTMENT (OUTPATIENT)
Dept: INFUSION THERAPY | Facility: HOSPITAL | Age: 85
End: 2025-07-02

## 2025-07-02 LAB
BASOPHILS # BLD AUTO: 0.04 K/UL — SIGNIFICANT CHANGE UP (ref 0–0.2)
BASOPHILS NFR BLD AUTO: 0.8 % — SIGNIFICANT CHANGE UP (ref 0–2)
EOSINOPHIL # BLD AUTO: 0.23 K/UL — SIGNIFICANT CHANGE UP (ref 0–0.5)
EOSINOPHIL NFR BLD AUTO: 4.8 % — SIGNIFICANT CHANGE UP (ref 0–6)
HCT VFR BLD CALC: 26.1 % — LOW (ref 39–50)
HGB BLD-MCNC: 8.8 G/DL — LOW (ref 13–17)
IMM GRANULOCYTES NFR BLD AUTO: 2.3 % — HIGH (ref 0–0.9)
LYMPHOCYTES # BLD AUTO: 1.24 K/UL — SIGNIFICANT CHANGE UP (ref 1–3.3)
LYMPHOCYTES # BLD AUTO: 25.7 % — SIGNIFICANT CHANGE UP (ref 13–44)
MCHC RBC-ENTMCNC: 33.7 G/DL — SIGNIFICANT CHANGE UP (ref 32–36)
MCHC RBC-ENTMCNC: 35.1 PG — HIGH (ref 27–34)
MCV RBC AUTO: 104 FL — HIGH (ref 80–100)
MONOCYTES # BLD AUTO: 0.31 K/UL — SIGNIFICANT CHANGE UP (ref 0–0.9)
MONOCYTES NFR BLD AUTO: 6.4 % — SIGNIFICANT CHANGE UP (ref 2–14)
NEUTROPHILS # BLD AUTO: 2.9 K/UL — SIGNIFICANT CHANGE UP (ref 1.8–7.4)
NEUTROPHILS NFR BLD AUTO: 60 % — SIGNIFICANT CHANGE UP (ref 43–77)
NRBC BLD AUTO-RTO: 0 /100 WBCS — SIGNIFICANT CHANGE UP (ref 0–0)
PLATELET # BLD AUTO: 159 K/UL — SIGNIFICANT CHANGE UP (ref 150–400)
RBC # BLD: 2.51 M/UL — LOW (ref 4.2–5.8)
RBC # FLD: 22.3 % — HIGH (ref 10.3–14.5)
WBC # BLD: 4.83 K/UL — SIGNIFICANT CHANGE UP (ref 3.8–10.5)
WBC # FLD AUTO: 4.83 K/UL — SIGNIFICANT CHANGE UP (ref 3.8–10.5)

## 2025-07-03 ENCOUNTER — APPOINTMENT (OUTPATIENT)
Dept: HEMATOLOGY ONCOLOGY | Facility: CLINIC | Age: 85
End: 2025-07-03

## 2025-07-03 ENCOUNTER — APPOINTMENT (OUTPATIENT)
Dept: INFUSION THERAPY | Facility: HOSPITAL | Age: 85
End: 2025-07-03

## 2025-07-03 NOTE — ED PROVIDER NOTE - EKG ADDITIONAL QUESTION - PERFORMED INDEPENDENT VISUALIZATION
Alma Weir  : 1966  Primary: [employer] (Medicaid Managed)  Secondary:  [facility] [place] [address] [place] [address]  Phone: [phone]  Fax: [phone] Plan Frequency: 2x/wk    Plan of Care/Certification Expiration Date: 25        Plan of Care/Certification Expiration Date:  Plan of Care/Certification Expiration Date: 25    Frequency/Duration: Plan Frequency: 2x/wk      Time In/Out:      2552-0726    PT Visit Info:    Plan Frequency: 2x/wk  Progress Note Counter:       Visit Count:  7    OUTPATIENT PHYSICAL THERAPY:   Treatment Note 7/3/2025       Episode  (LBP)               Treatment Diagnosis:    Difficulty in walking  Other low back pain  Medical/Referring Diagnosis:    Chronic left-sided low back pain without sciatica [M54.50, G89.29]  Primary osteoarthritis of left hip [M16.12]      Referring Physician:  Rafael Clifton DO MD Orders:  PT Eval and Treat   Return MD Appt:    Date of Onset:  Onset Date:  (about a year ago)     Allergies:   Patient has no known allergies.  Restrictions/Precautions:   Fall risk      Interventions Planned (Treatment may consist of any combination of the following):     See Assessment Note    REASON FOR TREATMENT:  LBP with a flexion preference. MRI from  showed foraminal narrowing due to disc protrusions at multiple levels with no central canal stenosis. She has recurrent LBP about 3 years that worsened one month ago after a fall ascending steps. She has a flexion preference. Symptoms are usually isolated to her back, but she has tingling in her LLE at times with prolonged standing. Apart from left hip flexion, there was no significant weakness with MMT, but her 30 second chair rise was 2.5 due to reported weakness.     Functional limitations reported at initial Eval: pain/difficulty with prolonged standing, stooping, lifting, getting off the floor, and household chores.       Subjective  Yes

## 2025-07-05 ENCOUNTER — APPOINTMENT (OUTPATIENT)
Dept: HEMATOLOGY ONCOLOGY | Facility: CLINIC | Age: 85
End: 2025-07-05

## 2025-07-05 ENCOUNTER — APPOINTMENT (OUTPATIENT)
Dept: INFUSION THERAPY | Facility: HOSPITAL | Age: 85
End: 2025-07-05

## 2025-07-10 ENCOUNTER — RX RENEWAL (OUTPATIENT)
Age: 85
End: 2025-07-10

## 2025-07-18 ENCOUNTER — INPATIENT (INPATIENT)
Facility: HOSPITAL | Age: 85
LOS: 8 days | Discharge: HOME HEALTH | DRG: 204 | End: 2025-07-27
Attending: STUDENT IN AN ORGANIZED HEALTH CARE EDUCATION/TRAINING PROGRAM | Admitting: STUDENT IN AN ORGANIZED HEALTH CARE EDUCATION/TRAINING PROGRAM
Payer: MEDICARE

## 2025-07-18 VITALS
TEMPERATURE: 98 F | DIASTOLIC BLOOD PRESSURE: 82 MMHG | RESPIRATION RATE: 18 BRPM | OXYGEN SATURATION: 94 % | SYSTOLIC BLOOD PRESSURE: 147 MMHG | WEIGHT: 184.97 LBS | HEIGHT: 67 IN | HEART RATE: 99 BPM

## 2025-07-18 DIAGNOSIS — Z86.79 PERSONAL HISTORY OF OTHER DISEASES OF THE CIRCULATORY SYSTEM: Chronic | ICD-10-CM

## 2025-07-18 DIAGNOSIS — R06.02 SHORTNESS OF BREATH: ICD-10-CM

## 2025-07-18 DIAGNOSIS — R09.89 OTHER SPECIFIED SYMPTOMS AND SIGNS INVOLVING THE CIRCULATORY AND RESPIRATORY SYSTEMS: ICD-10-CM

## 2025-07-18 DIAGNOSIS — Z98.890 OTHER SPECIFIED POSTPROCEDURAL STATES: Chronic | ICD-10-CM

## 2025-07-18 DIAGNOSIS — Z90.79 ACQUIRED ABSENCE OF OTHER GENITAL ORGAN(S): Chronic | ICD-10-CM

## 2025-07-18 DIAGNOSIS — Z98.49 CATARACT EXTRACTION STATUS, UNSPECIFIED EYE: Chronic | ICD-10-CM

## 2025-07-18 LAB
ADD ON TEST-SPECIMEN IN LAB: SIGNIFICANT CHANGE UP
ALBUMIN SERPL ELPH-MCNC: 4.1 G/DL — SIGNIFICANT CHANGE UP (ref 3.3–5)
ALP SERPL-CCNC: 82 U/L — SIGNIFICANT CHANGE UP (ref 40–120)
ALT FLD-CCNC: 8 U/L — LOW (ref 10–45)
ANION GAP SERPL CALC-SCNC: 17 MMOL/L — SIGNIFICANT CHANGE UP (ref 5–17)
ANISOCYTOSIS BLD QL: SLIGHT — SIGNIFICANT CHANGE UP
APPEARANCE UR: CLEAR — SIGNIFICANT CHANGE UP
APTT BLD: 34.3 SEC — SIGNIFICANT CHANGE UP (ref 26.1–36.8)
AST SERPL-CCNC: 12 U/L — SIGNIFICANT CHANGE UP (ref 10–40)
BACTERIA # UR AUTO: NEGATIVE /HPF — SIGNIFICANT CHANGE UP
BASOPHILS # BLD AUTO: 0 K/UL — SIGNIFICANT CHANGE UP (ref 0–0.2)
BASOPHILS # BLD MANUAL: 0.03 K/UL — SIGNIFICANT CHANGE UP (ref 0–0.2)
BASOPHILS NFR BLD AUTO: 0 % — SIGNIFICANT CHANGE UP (ref 0–2)
BASOPHILS NFR BLD MANUAL: 1.7 % — SIGNIFICANT CHANGE UP (ref 0–2)
BILIRUB SERPL-MCNC: 1 MG/DL — SIGNIFICANT CHANGE UP (ref 0.2–1.2)
BILIRUB UR-MCNC: NEGATIVE — SIGNIFICANT CHANGE UP
BUN SERPL-MCNC: 33 MG/DL — HIGH (ref 7–23)
CALCIUM SERPL-MCNC: 8.4 MG/DL — SIGNIFICANT CHANGE UP (ref 8.4–10.5)
CAST: 1 /LPF — SIGNIFICANT CHANGE UP (ref 0–4)
CHLORIDE SERPL-SCNC: 102 MMOL/L — SIGNIFICANT CHANGE UP (ref 96–108)
CO2 SERPL-SCNC: 23 MMOL/L — SIGNIFICANT CHANGE UP (ref 22–31)
COLOR SPEC: YELLOW — SIGNIFICANT CHANGE UP
CREAT SERPL-MCNC: 2.37 MG/DL — HIGH (ref 0.5–1.3)
DACRYOCYTES BLD QL SMEAR: SLIGHT — SIGNIFICANT CHANGE UP
DIFF PNL FLD: NEGATIVE — SIGNIFICANT CHANGE UP
EGFR: 26 ML/MIN/1.73M2 — LOW
EGFR: 26 ML/MIN/1.73M2 — LOW
ELLIPTOCYTES BLD QL SMEAR: SLIGHT — SIGNIFICANT CHANGE UP
EOSINOPHIL # BLD AUTO: 0.04 K/UL — SIGNIFICANT CHANGE UP (ref 0–0.5)
EOSINOPHIL # BLD MANUAL: 0.06 K/UL — SIGNIFICANT CHANGE UP (ref 0–0.5)
EOSINOPHIL NFR BLD AUTO: 2.4 % — SIGNIFICANT CHANGE UP (ref 0–6)
EOSINOPHIL NFR BLD MANUAL: 3.4 % — SIGNIFICANT CHANGE UP (ref 0–6)
FLUAV AG NPH QL: SIGNIFICANT CHANGE UP
FLUBV AG NPH QL: SIGNIFICANT CHANGE UP
GAS PNL BLDV: SIGNIFICANT CHANGE UP
GLUCOSE SERPL-MCNC: 128 MG/DL — HIGH (ref 70–99)
GLUCOSE UR QL: NEGATIVE MG/DL — SIGNIFICANT CHANGE UP
HCT VFR BLD CALC: 25.4 % — LOW (ref 39–50)
HGB BLD-MCNC: 8.6 G/DL — LOW (ref 13–17)
IMM GRANULOCYTES # BLD AUTO: 0.01 K/UL — SIGNIFICANT CHANGE UP (ref 0–0.07)
IMM GRANULOCYTES NFR BLD AUTO: 0.6 % — SIGNIFICANT CHANGE UP (ref 0–0.9)
IMMATURE PLATELET FRACTION #: 1.2 K/UL — LOW (ref 3.9–12.5)
IMMATURE PLATELET FRACTION %: 3.2 % — SIGNIFICANT CHANGE UP (ref 1.6–7.1)
INR BLD: 1.64 RATIO — HIGH (ref 0.85–1.16)
KETONES UR QL: NEGATIVE MG/DL — SIGNIFICANT CHANGE UP
LEUKOCYTE ESTERASE UR-ACNC: ABNORMAL
LYMPHOCYTES # BLD AUTO: 0.65 K/UL — LOW (ref 1–3.3)
LYMPHOCYTES # BLD MANUAL: 0.75 K/UL — LOW (ref 1–3.3)
LYMPHOCYTES NFR BLD AUTO: 39.2 % — SIGNIFICANT CHANGE UP (ref 13–44)
LYMPHOCYTES NFR BLD MANUAL: 45.3 % — HIGH (ref 13–44)
MANUAL NRBC #: 0.03 K/UL — HIGH (ref 0–0)
MCHC RBC-ENTMCNC: 33.9 G/DL — SIGNIFICANT CHANGE UP (ref 32–36)
MCHC RBC-ENTMCNC: 35.4 PG — HIGH (ref 27–34)
MCV RBC AUTO: 104.5 FL — HIGH (ref 80–100)
MONOCYTES # BLD AUTO: 0.12 K/UL — SIGNIFICANT CHANGE UP (ref 0–0.9)
MONOCYTES # BLD MANUAL: 0.04 K/UL — SIGNIFICANT CHANGE UP (ref 0–0.9)
MONOCYTES NFR BLD AUTO: 7.2 % — SIGNIFICANT CHANGE UP (ref 2–14)
MONOCYTES NFR BLD MANUAL: 2.6 % — SIGNIFICANT CHANGE UP (ref 2–14)
NEUTROPHILS # BLD AUTO: 0.84 K/UL — LOW (ref 1.8–7.4)
NEUTROPHILS # BLD MANUAL: 0.78 K/UL — LOW (ref 1.8–7.4)
NEUTROPHILS NFR BLD AUTO: 50.6 % — SIGNIFICANT CHANGE UP (ref 43–77)
NEUTROPHILS NFR BLD MANUAL: 47 % — SIGNIFICANT CHANGE UP (ref 43–77)
NITRITE UR-MCNC: NEGATIVE — SIGNIFICANT CHANGE UP
NRBC # BLD AUTO: 0 K/UL — SIGNIFICANT CHANGE UP (ref 0–0)
NRBC # BLD: 2 /100 WBCS — HIGH (ref 0–0)
NRBC # FLD: 0 K/UL — SIGNIFICANT CHANGE UP (ref 0–0)
NRBC BLD AUTO-RTO: 0 /100 WBCS — SIGNIFICANT CHANGE UP (ref 0–0)
NRBC BLD-RTO: 2 /100 WBCS — HIGH (ref 0–0)
PH UR: 7 — SIGNIFICANT CHANGE UP (ref 5–8)
PLAT MORPH BLD: NORMAL — SIGNIFICANT CHANGE UP
PLATELET # BLD AUTO: 39 K/UL — LOW (ref 150–400)
PMV BLD: 12.1 FL — SIGNIFICANT CHANGE UP (ref 7–13)
POIKILOCYTOSIS BLD QL AUTO: ABNORMAL
POLYCHROMASIA BLD QL SMEAR: ABNORMAL
POTASSIUM SERPL-MCNC: 3.7 MMOL/L — SIGNIFICANT CHANGE UP (ref 3.5–5.3)
POTASSIUM SERPL-SCNC: 3.7 MMOL/L — SIGNIFICANT CHANGE UP (ref 3.5–5.3)
PROT SERPL-MCNC: 6.8 G/DL — SIGNIFICANT CHANGE UP (ref 6–8.3)
PROT UR-MCNC: 100 MG/DL
PROTHROM AB SERPL-ACNC: 18.6 SEC — HIGH (ref 9.9–13.4)
RBC # BLD: 2.43 M/UL — LOW (ref 4.2–5.8)
RBC # FLD: 23.1 % — HIGH (ref 10.3–14.5)
RBC BLD AUTO: ABNORMAL
RBC CASTS # UR COMP ASSIST: 1 /HPF — SIGNIFICANT CHANGE UP (ref 0–4)
RSV RNA NPH QL NAA+NON-PROBE: SIGNIFICANT CHANGE UP
SARS-COV-2 RNA SPEC QL NAA+PROBE: SIGNIFICANT CHANGE UP
SODIUM SERPL-SCNC: 142 MMOL/L — SIGNIFICANT CHANGE UP (ref 135–145)
SOURCE RESPIRATORY: SIGNIFICANT CHANGE UP
SP GR SPEC: 1.01 — SIGNIFICANT CHANGE UP (ref 1–1.03)
SQUAMOUS # UR AUTO: 1 /HPF — SIGNIFICANT CHANGE UP (ref 0–5)
TROPONIN T, HIGH SENSITIVITY RESULT: 112 NG/L — HIGH (ref 0–51)
TROPONIN T, HIGH SENSITIVITY RESULT: 119 NG/L — HIGH (ref 0–51)
UROBILINOGEN FLD QL: 0.2 MG/DL — SIGNIFICANT CHANGE UP (ref 0.2–1)
WBC # BLD: 1.66 K/UL — LOW (ref 3.8–10.5)
WBC # FLD AUTO: 1.66 K/UL — LOW (ref 3.8–10.5)
WBC UR QL: 4 /HPF — SIGNIFICANT CHANGE UP (ref 0–5)

## 2025-07-18 PROCEDURE — 99285 EMERGENCY DEPT VISIT HI MDM: CPT

## 2025-07-18 PROCEDURE — 93970 EXTREMITY STUDY: CPT

## 2025-07-18 PROCEDURE — 82435 ASSAY OF BLOOD CHLORIDE: CPT

## 2025-07-18 PROCEDURE — 71250 CT THORAX DX C-: CPT | Mod: 26

## 2025-07-18 PROCEDURE — 93970 EXTREMITY STUDY: CPT | Mod: 26

## 2025-07-18 PROCEDURE — 85014 HEMATOCRIT: CPT

## 2025-07-18 PROCEDURE — 99223 1ST HOSP IP/OBS HIGH 75: CPT

## 2025-07-18 PROCEDURE — 85018 HEMOGLOBIN: CPT

## 2025-07-18 PROCEDURE — 82803 BLOOD GASES ANY COMBINATION: CPT

## 2025-07-18 PROCEDURE — 85025 COMPLETE CBC W/AUTO DIFF WBC: CPT

## 2025-07-18 PROCEDURE — 84484 ASSAY OF TROPONIN QUANT: CPT

## 2025-07-18 PROCEDURE — 83880 ASSAY OF NATRIURETIC PEPTIDE: CPT

## 2025-07-18 PROCEDURE — 85730 THROMBOPLASTIN TIME PARTIAL: CPT

## 2025-07-18 PROCEDURE — 83605 ASSAY OF LACTIC ACID: CPT

## 2025-07-18 PROCEDURE — 81001 URINALYSIS AUTO W/SCOPE: CPT

## 2025-07-18 PROCEDURE — 71045 X-RAY EXAM CHEST 1 VIEW: CPT | Mod: 26

## 2025-07-18 PROCEDURE — 85610 PROTHROMBIN TIME: CPT

## 2025-07-18 PROCEDURE — 82947 ASSAY GLUCOSE BLOOD QUANT: CPT

## 2025-07-18 PROCEDURE — 87040 BLOOD CULTURE FOR BACTERIA: CPT

## 2025-07-18 PROCEDURE — 93010 ELECTROCARDIOGRAM REPORT: CPT

## 2025-07-18 PROCEDURE — 82330 ASSAY OF CALCIUM: CPT

## 2025-07-18 PROCEDURE — 80053 COMPREHEN METABOLIC PANEL: CPT

## 2025-07-18 PROCEDURE — 71045 X-RAY EXAM CHEST 1 VIEW: CPT

## 2025-07-18 PROCEDURE — 84132 ASSAY OF SERUM POTASSIUM: CPT

## 2025-07-18 PROCEDURE — 84295 ASSAY OF SERUM SODIUM: CPT

## 2025-07-18 PROCEDURE — 87637 SARSCOV2&INF A&B&RSV AMP PRB: CPT

## 2025-07-18 PROCEDURE — 36415 COLL VENOUS BLD VENIPUNCTURE: CPT

## 2025-07-18 PROCEDURE — 71250 CT THORAX DX C-: CPT

## 2025-07-18 RX ORDER — ACETAMINOPHEN 500 MG/5ML
650 LIQUID (ML) ORAL ONCE
Refills: 0 | Status: COMPLETED | OUTPATIENT
Start: 2025-07-18 | End: 2025-07-18

## 2025-07-18 RX ORDER — HEPARIN SODIUM 1000 [USP'U]/ML
INJECTION INTRAVENOUS; SUBCUTANEOUS
Qty: 25000 | Refills: 0 | Status: DISCONTINUED | OUTPATIENT
Start: 2025-07-18 | End: 2025-07-20

## 2025-07-18 RX ORDER — HEPARIN SODIUM 1000 [USP'U]/ML
7000 INJECTION INTRAVENOUS; SUBCUTANEOUS ONCE
Refills: 0 | Status: COMPLETED | OUTPATIENT
Start: 2025-07-18 | End: 2025-07-18

## 2025-07-18 RX ORDER — HEPARIN SODIUM 1000 [USP'U]/ML
7000 INJECTION INTRAVENOUS; SUBCUTANEOUS EVERY 6 HOURS
Refills: 0 | Status: DISCONTINUED | OUTPATIENT
Start: 2025-07-18 | End: 2025-07-20

## 2025-07-18 RX ORDER — ACETAMINOPHEN 500 MG/5ML
1000 LIQUID (ML) ORAL ONCE
Refills: 0 | Status: DISCONTINUED | OUTPATIENT
Start: 2025-07-18 | End: 2025-07-18

## 2025-07-18 RX ORDER — HEPARIN SODIUM 1000 [USP'U]/ML
3500 INJECTION INTRAVENOUS; SUBCUTANEOUS EVERY 6 HOURS
Refills: 0 | Status: DISCONTINUED | OUTPATIENT
Start: 2025-07-18 | End: 2025-07-20

## 2025-07-18 RX ADMIN — HEPARIN SODIUM 7000 UNIT(S): 1000 INJECTION INTRAVENOUS; SUBCUTANEOUS at 20:30

## 2025-07-18 RX ADMIN — Medication 650 MILLIGRAM(S): at 22:42

## 2025-07-18 RX ADMIN — HEPARIN SODIUM 1600 UNIT(S)/HR: 1000 INJECTION INTRAVENOUS; SUBCUTANEOUS at 20:31

## 2025-07-19 ENCOUNTER — RESULT REVIEW (OUTPATIENT)
Age: 85
End: 2025-07-19

## 2025-07-19 DIAGNOSIS — J96.01 ACUTE RESPIRATORY FAILURE WITH HYPOXIA: ICD-10-CM

## 2025-07-19 DIAGNOSIS — I48.0 PAROXYSMAL ATRIAL FIBRILLATION: ICD-10-CM

## 2025-07-19 DIAGNOSIS — Z29.9 ENCOUNTER FOR PROPHYLACTIC MEASURES, UNSPECIFIED: ICD-10-CM

## 2025-07-19 DIAGNOSIS — N17.9 ACUTE KIDNEY FAILURE, UNSPECIFIED: ICD-10-CM

## 2025-07-19 DIAGNOSIS — D61.818 OTHER PANCYTOPENIA: ICD-10-CM

## 2025-07-19 DIAGNOSIS — N18.4 CHRONIC KIDNEY DISEASE, STAGE 4 (SEVERE): ICD-10-CM

## 2025-07-19 DIAGNOSIS — I10 ESSENTIAL (PRIMARY) HYPERTENSION: ICD-10-CM

## 2025-07-19 DIAGNOSIS — I82.409 ACUTE EMBOLISM AND THROMBOSIS OF UNSPECIFIED DEEP VEINS OF UNSPECIFIED LOWER EXTREMITY: ICD-10-CM

## 2025-07-19 LAB
ADD ON TEST-SPECIMEN IN LAB: SIGNIFICANT CHANGE UP
APTT BLD: 61.2 SEC — HIGH (ref 26.1–36.8)
APTT BLD: 80.7 SEC — HIGH (ref 26.1–36.8)
HCT VFR BLD CALC: 24.5 % — LOW (ref 39–50)
HGB BLD-MCNC: 8.1 G/DL — LOW (ref 13–17)
IMMATURE PLATELET FRACTION #: 1.2 K/UL — LOW (ref 3.9–12.5)
IMMATURE PLATELET FRACTION %: 3.6 % — SIGNIFICANT CHANGE UP (ref 1.6–7.1)
MCHC RBC-ENTMCNC: 33.1 G/DL — SIGNIFICANT CHANGE UP (ref 32–36)
MCHC RBC-ENTMCNC: 34.8 PG — HIGH (ref 27–34)
MCV RBC AUTO: 105.2 FL — HIGH (ref 80–100)
NRBC # BLD AUTO: 0 K/UL — SIGNIFICANT CHANGE UP (ref 0–0)
NRBC # FLD: 0 K/UL — SIGNIFICANT CHANGE UP (ref 0–0)
NRBC BLD AUTO-RTO: 0 /100 WBCS — SIGNIFICANT CHANGE UP (ref 0–0)
PLATELET # BLD AUTO: 32 K/UL — LOW (ref 150–400)
PMV BLD: 10.5 FL — SIGNIFICANT CHANGE UP (ref 7–13)
RBC # BLD: 2.33 M/UL — LOW (ref 4.2–5.8)
RBC # FLD: 23 % — HIGH (ref 10.3–14.5)
WBC # BLD: 1.39 K/UL — LOW (ref 3.8–10.5)
WBC # FLD AUTO: 1.39 K/UL — LOW (ref 3.8–10.5)

## 2025-07-19 PROCEDURE — 84295 ASSAY OF SERUM SODIUM: CPT

## 2025-07-19 PROCEDURE — 83880 ASSAY OF NATRIURETIC PEPTIDE: CPT

## 2025-07-19 PROCEDURE — 83605 ASSAY OF LACTIC ACID: CPT

## 2025-07-19 PROCEDURE — 85730 THROMBOPLASTIN TIME PARTIAL: CPT

## 2025-07-19 PROCEDURE — 82330 ASSAY OF CALCIUM: CPT

## 2025-07-19 PROCEDURE — 85025 COMPLETE CBC W/AUTO DIFF WBC: CPT

## 2025-07-19 PROCEDURE — 84484 ASSAY OF TROPONIN QUANT: CPT

## 2025-07-19 PROCEDURE — 87637 SARSCOV2&INF A&B&RSV AMP PRB: CPT

## 2025-07-19 PROCEDURE — 71250 CT THORAX DX C-: CPT

## 2025-07-19 PROCEDURE — 82435 ASSAY OF BLOOD CHLORIDE: CPT

## 2025-07-19 PROCEDURE — 85027 COMPLETE CBC AUTOMATED: CPT

## 2025-07-19 PROCEDURE — 84132 ASSAY OF SERUM POTASSIUM: CPT

## 2025-07-19 PROCEDURE — 93970 EXTREMITY STUDY: CPT

## 2025-07-19 PROCEDURE — 82803 BLOOD GASES ANY COMBINATION: CPT

## 2025-07-19 PROCEDURE — 81001 URINALYSIS AUTO W/SCOPE: CPT

## 2025-07-19 PROCEDURE — 85018 HEMOGLOBIN: CPT

## 2025-07-19 PROCEDURE — 85610 PROTHROMBIN TIME: CPT

## 2025-07-19 PROCEDURE — 99233 SBSQ HOSP IP/OBS HIGH 50: CPT

## 2025-07-19 PROCEDURE — 36415 COLL VENOUS BLD VENIPUNCTURE: CPT

## 2025-07-19 PROCEDURE — 85014 HEMATOCRIT: CPT

## 2025-07-19 PROCEDURE — 82947 ASSAY GLUCOSE BLOOD QUANT: CPT

## 2025-07-19 PROCEDURE — 87040 BLOOD CULTURE FOR BACTERIA: CPT

## 2025-07-19 PROCEDURE — 93306 TTE W/DOPPLER COMPLETE: CPT | Mod: 26

## 2025-07-19 PROCEDURE — 71045 X-RAY EXAM CHEST 1 VIEW: CPT

## 2025-07-19 PROCEDURE — 80053 COMPREHEN METABOLIC PANEL: CPT

## 2025-07-19 RX ORDER — GABAPENTIN 400 MG/1
300 CAPSULE ORAL THREE TIMES A DAY
Refills: 0 | Status: DISCONTINUED | OUTPATIENT
Start: 2025-07-19 | End: 2025-07-27

## 2025-07-19 RX ORDER — METOPROLOL SUCCINATE 50 MG/1
12.5 TABLET, EXTENDED RELEASE ORAL DAILY
Refills: 0 | Status: DISCONTINUED | OUTPATIENT
Start: 2025-07-19 | End: 2025-07-20

## 2025-07-19 RX ORDER — POLYETHYLENE GLYCOL 3350 17 G/17G
17 POWDER, FOR SOLUTION ORAL DAILY
Refills: 0 | Status: DISCONTINUED | OUTPATIENT
Start: 2025-07-19 | End: 2025-07-27

## 2025-07-19 RX ORDER — SENNA 187 MG
2 TABLET ORAL AT BEDTIME
Refills: 0 | Status: DISCONTINUED | OUTPATIENT
Start: 2025-07-19 | End: 2025-07-27

## 2025-07-19 RX ORDER — FUROSEMIDE 10 MG/ML
80 INJECTION INTRAMUSCULAR; INTRAVENOUS EVERY 12 HOURS
Refills: 0 | Status: DISCONTINUED | OUTPATIENT
Start: 2025-07-19 | End: 2025-07-20

## 2025-07-19 RX ORDER — ISOSORBDIE DINITRATE 30 MG/1
10 TABLET ORAL THREE TIMES A DAY
Refills: 0 | Status: DISCONTINUED | OUTPATIENT
Start: 2025-07-19 | End: 2025-07-27

## 2025-07-19 RX ORDER — ROSUVASTATIN CALCIUM 20 MG/1
10 TABLET, FILM COATED ORAL AT BEDTIME
Refills: 0 | Status: DISCONTINUED | OUTPATIENT
Start: 2025-07-19 | End: 2025-07-27

## 2025-07-19 RX ORDER — FLUCONAZOLE 150 MG
100 TABLET ORAL DAILY
Refills: 0 | Status: DISCONTINUED | OUTPATIENT
Start: 2025-07-19 | End: 2025-07-27

## 2025-07-19 RX ORDER — CLOPIDOGREL BISULFATE 75 MG/1
75 TABLET, FILM COATED ORAL DAILY
Refills: 0 | Status: DISCONTINUED | OUTPATIENT
Start: 2025-07-19 | End: 2025-07-27

## 2025-07-19 RX ADMIN — Medication 100 MILLIGRAM(S): at 17:23

## 2025-07-19 RX ADMIN — Medication 667 MILLIGRAM(S): at 10:51

## 2025-07-19 RX ADMIN — ROSUVASTATIN CALCIUM 10 MILLIGRAM(S): 20 TABLET, FILM COATED ORAL at 21:10

## 2025-07-19 RX ADMIN — HEPARIN SODIUM 1600 UNIT(S)/HR: 1000 INJECTION INTRAVENOUS; SUBCUTANEOUS at 13:45

## 2025-07-19 RX ADMIN — ISOSORBDIE DINITRATE 10 MILLIGRAM(S): 30 TABLET ORAL at 15:32

## 2025-07-19 RX ADMIN — METOPROLOL SUCCINATE 12.5 MILLIGRAM(S): 50 TABLET, EXTENDED RELEASE ORAL at 05:18

## 2025-07-19 RX ADMIN — Medication 667 MILLIGRAM(S): at 18:06

## 2025-07-19 RX ADMIN — FUROSEMIDE 80 MILLIGRAM(S): 10 INJECTION INTRAMUSCULAR; INTRAVENOUS at 01:03

## 2025-07-19 RX ADMIN — HEPARIN SODIUM 1600 UNIT(S)/HR: 1000 INJECTION INTRAVENOUS; SUBCUTANEOUS at 03:06

## 2025-07-19 RX ADMIN — Medication 200 MILLIGRAM(S): at 12:58

## 2025-07-19 RX ADMIN — ISOSORBDIE DINITRATE 10 MILLIGRAM(S): 30 TABLET ORAL at 05:18

## 2025-07-19 RX ADMIN — Medication 75 MILLIGRAM(S): at 05:19

## 2025-07-19 RX ADMIN — ISOSORBDIE DINITRATE 10 MILLIGRAM(S): 30 TABLET ORAL at 10:50

## 2025-07-19 RX ADMIN — Medication 2 TABLET(S): at 21:11

## 2025-07-19 RX ADMIN — Medication 667 MILLIGRAM(S): at 12:57

## 2025-07-19 RX ADMIN — CLOPIDOGREL BISULFATE 75 MILLIGRAM(S): 75 TABLET, FILM COATED ORAL at 12:57

## 2025-07-19 RX ADMIN — GABAPENTIN 300 MILLIGRAM(S): 400 CAPSULE ORAL at 05:18

## 2025-07-19 RX ADMIN — Medication 1 APPLICATION(S): at 12:59

## 2025-07-19 RX ADMIN — Medication 40 MILLIGRAM(S): at 05:18

## 2025-07-19 RX ADMIN — Medication 75 MILLIGRAM(S): at 12:56

## 2025-07-19 RX ADMIN — POLYETHYLENE GLYCOL 3350 17 GRAM(S): 17 POWDER, FOR SOLUTION ORAL at 12:58

## 2025-07-19 RX ADMIN — GABAPENTIN 300 MILLIGRAM(S): 400 CAPSULE ORAL at 21:10

## 2025-07-19 RX ADMIN — FUROSEMIDE 80 MILLIGRAM(S): 10 INJECTION INTRAMUSCULAR; INTRAVENOUS at 17:24

## 2025-07-19 RX ADMIN — GABAPENTIN 300 MILLIGRAM(S): 400 CAPSULE ORAL at 12:58

## 2025-07-19 RX ADMIN — Medication 75 MILLIGRAM(S): at 21:11

## 2025-07-20 LAB
ALBUMIN SERPL ELPH-MCNC: 3.7 G/DL — SIGNIFICANT CHANGE UP (ref 3.3–5)
ALP SERPL-CCNC: 69 U/L — SIGNIFICANT CHANGE UP (ref 40–120)
ALT FLD-CCNC: 6 U/L — LOW (ref 10–45)
ANION GAP SERPL CALC-SCNC: 15 MMOL/L — SIGNIFICANT CHANGE UP (ref 5–17)
APTT BLD: 36.3 SEC — SIGNIFICANT CHANGE UP (ref 26.1–36.8)
APTT BLD: 59.2 SEC — HIGH (ref 26.1–36.8)
AST SERPL-CCNC: 10 U/L — SIGNIFICANT CHANGE UP (ref 10–40)
BASOPHILS # BLD AUTO: 0.01 K/UL — SIGNIFICANT CHANGE UP (ref 0–0.2)
BASOPHILS NFR BLD AUTO: 0.9 % — SIGNIFICANT CHANGE UP (ref 0–2)
BILIRUB SERPL-MCNC: 0.8 MG/DL — SIGNIFICANT CHANGE UP (ref 0.2–1.2)
BLD GP AB SCN SERPL QL: NEGATIVE — SIGNIFICANT CHANGE UP
BUN SERPL-MCNC: 36 MG/DL — HIGH (ref 7–23)
CALCIUM SERPL-MCNC: 8.4 MG/DL — SIGNIFICANT CHANGE UP (ref 8.4–10.5)
CHLORIDE SERPL-SCNC: 103 MMOL/L — SIGNIFICANT CHANGE UP (ref 96–108)
CK MB CFR SERPL CALC: 1.7 NG/ML — SIGNIFICANT CHANGE UP (ref 0–6.7)
CO2 SERPL-SCNC: 25 MMOL/L — SIGNIFICANT CHANGE UP (ref 22–31)
CREAT SERPL-MCNC: 2.8 MG/DL — HIGH (ref 0.5–1.3)
EGFR: 22 ML/MIN/1.73M2 — LOW
EGFR: 22 ML/MIN/1.73M2 — LOW
EOSINOPHIL # BLD AUTO: 0.03 K/UL — SIGNIFICANT CHANGE UP (ref 0–0.5)
EOSINOPHIL NFR BLD AUTO: 2.6 % — SIGNIFICANT CHANGE UP (ref 0–6)
GAS PNL BLDA: SIGNIFICANT CHANGE UP
GLUCOSE SERPL-MCNC: 153 MG/DL — HIGH (ref 70–99)
HCT VFR BLD CALC: 22.3 % — LOW (ref 39–50)
HCT VFR BLD CALC: 22.4 % — LOW (ref 39–50)
HGB BLD-MCNC: 7.1 G/DL — LOW (ref 13–17)
HGB BLD-MCNC: 7.4 G/DL — LOW (ref 13–17)
IMM GRANULOCYTES # BLD AUTO: 0.07 K/UL — SIGNIFICANT CHANGE UP (ref 0–0.07)
IMM GRANULOCYTES NFR BLD AUTO: 6 % — HIGH (ref 0–0.9)
IMMATURE PLATELET FRACTION #: 1.4 K/UL — LOW (ref 3.9–12.5)
IMMATURE PLATELET FRACTION #: 1.4 K/UL — LOW (ref 3.9–12.5)
IMMATURE PLATELET FRACTION %: 3.4 % — SIGNIFICANT CHANGE UP (ref 1.6–7.1)
IMMATURE PLATELET FRACTION %: 3.5 % — SIGNIFICANT CHANGE UP (ref 1.6–7.1)
LACTATE SERPL-SCNC: 1.4 MMOL/L — SIGNIFICANT CHANGE UP (ref 0.5–2)
LYMPHOCYTES # BLD AUTO: 0.68 K/UL — LOW (ref 1–3.3)
LYMPHOCYTES NFR BLD AUTO: 58.1 % — HIGH (ref 13–44)
MAGNESIUM SERPL-MCNC: 2.2 MG/DL — SIGNIFICANT CHANGE UP (ref 1.6–2.6)
MCHC RBC-ENTMCNC: 31.7 G/DL — LOW (ref 32–36)
MCHC RBC-ENTMCNC: 33.2 G/DL — SIGNIFICANT CHANGE UP (ref 32–36)
MCHC RBC-ENTMCNC: 34.1 PG — HIGH (ref 27–34)
MCHC RBC-ENTMCNC: 35.6 PG — HIGH (ref 27–34)
MCV RBC AUTO: 107.2 FL — HIGH (ref 80–100)
MCV RBC AUTO: 107.7 FL — HIGH (ref 80–100)
MONOCYTES # BLD AUTO: 0.09 K/UL — SIGNIFICANT CHANGE UP (ref 0–0.9)
MONOCYTES NFR BLD AUTO: 7.7 % — SIGNIFICANT CHANGE UP (ref 2–14)
NEUTROPHILS # BLD AUTO: 0.29 K/UL — LOW (ref 1.8–7.4)
NEUTROPHILS NFR BLD AUTO: 24.7 % — LOW (ref 43–77)
NRBC # BLD AUTO: 0 K/UL — SIGNIFICANT CHANGE UP (ref 0–0)
NRBC # BLD AUTO: 0.02 K/UL — HIGH (ref 0–0)
NRBC # FLD: 0 K/UL — SIGNIFICANT CHANGE UP (ref 0–0)
NRBC # FLD: 0.02 K/UL — HIGH (ref 0–0)
NRBC BLD AUTO-RTO: 0 /100 WBCS — SIGNIFICANT CHANGE UP (ref 0–0)
NRBC BLD AUTO-RTO: 2 /100 WBCS — HIGH (ref 0–0)
PHOSPHATE SERPL-MCNC: 3.8 MG/DL — SIGNIFICANT CHANGE UP (ref 2.5–4.5)
PLATELET # BLD AUTO: 40 K/UL — LOW (ref 150–400)
PLATELET # BLD AUTO: 41 K/UL — LOW (ref 150–400)
PMV BLD: 10 FL — SIGNIFICANT CHANGE UP (ref 7–13)
PMV BLD: 11 FL — SIGNIFICANT CHANGE UP (ref 7–13)
POTASSIUM SERPL-MCNC: 3.5 MMOL/L — SIGNIFICANT CHANGE UP (ref 3.5–5.3)
POTASSIUM SERPL-SCNC: 3.5 MMOL/L — SIGNIFICANT CHANGE UP (ref 3.5–5.3)
PROT SERPL-MCNC: 6.2 G/DL — SIGNIFICANT CHANGE UP (ref 6–8.3)
RBC # BLD: 2.08 M/UL — LOW (ref 4.2–5.8)
RBC # BLD: 2.08 M/UL — LOW (ref 4.2–5.8)
RBC # FLD: 24 % — HIGH (ref 10.3–14.5)
RBC # FLD: 24.1 % — HIGH (ref 10.3–14.5)
RH IG SCN BLD-IMP: NEGATIVE — SIGNIFICANT CHANGE UP
SODIUM SERPL-SCNC: 143 MMOL/L — SIGNIFICANT CHANGE UP (ref 135–145)
TROPONIN T, HIGH SENSITIVITY RESULT: 174 NG/L — HIGH (ref 0–51)
WBC # BLD: 1.16 K/UL — LOW (ref 3.8–10.5)
WBC # BLD: 1.17 K/UL — LOW (ref 3.8–10.5)
WBC # FLD AUTO: 1.16 K/UL — LOW (ref 3.8–10.5)
WBC # FLD AUTO: 1.17 K/UL — LOW (ref 3.8–10.5)

## 2025-07-20 PROCEDURE — 99233 SBSQ HOSP IP/OBS HIGH 50: CPT | Mod: GC

## 2025-07-20 RX ORDER — IPRATROPIUM BROMIDE AND ALBUTEROL SULFATE .5; 2.5 MG/3ML; MG/3ML
3 SOLUTION RESPIRATORY (INHALATION) ONCE
Refills: 0 | Status: COMPLETED | OUTPATIENT
Start: 2025-07-20 | End: 2025-07-20

## 2025-07-20 RX ORDER — HEPARIN SODIUM 1000 [USP'U]/ML
1800 INJECTION INTRAVENOUS; SUBCUTANEOUS
Qty: 25000 | Refills: 0 | Status: DISCONTINUED | OUTPATIENT
Start: 2025-07-20 | End: 2025-07-20

## 2025-07-20 RX ORDER — HEPARIN SODIUM 1000 [USP'U]/ML
18 INJECTION INTRAVENOUS; SUBCUTANEOUS
Qty: 25000 | Refills: 0 | Status: DISCONTINUED | OUTPATIENT
Start: 2025-07-20 | End: 2025-07-20

## 2025-07-20 RX ORDER — HEPARIN SODIUM 1000 [USP'U]/ML
1800 INJECTION INTRAVENOUS; SUBCUTANEOUS
Qty: 25000 | Refills: 0 | Status: DISCONTINUED | OUTPATIENT
Start: 2025-07-20 | End: 2025-07-22

## 2025-07-20 RX ORDER — HEPARIN SODIUM 1000 [USP'U]/ML
1600 INJECTION INTRAVENOUS; SUBCUTANEOUS
Qty: 25000 | Refills: 0 | Status: DISCONTINUED | OUTPATIENT
Start: 2025-07-20 | End: 2025-07-20

## 2025-07-20 RX ORDER — METOPROLOL SUCCINATE 50 MG/1
12.5 TABLET, EXTENDED RELEASE ORAL
Refills: 0 | Status: DISCONTINUED | OUTPATIENT
Start: 2025-07-20 | End: 2025-07-21

## 2025-07-20 RX ADMIN — Medication 200 MILLIGRAM(S): at 12:03

## 2025-07-20 RX ADMIN — HEPARIN SODIUM 16 UNIT(S)/HR: 1000 INJECTION INTRAVENOUS; SUBCUTANEOUS at 11:23

## 2025-07-20 RX ADMIN — Medication 2 TABLET(S): at 21:21

## 2025-07-20 RX ADMIN — METOPROLOL SUCCINATE 12.5 MILLIGRAM(S): 50 TABLET, EXTENDED RELEASE ORAL at 17:53

## 2025-07-20 RX ADMIN — METOPROLOL SUCCINATE 12.5 MILLIGRAM(S): 50 TABLET, EXTENDED RELEASE ORAL at 05:23

## 2025-07-20 RX ADMIN — Medication 75 MILLIGRAM(S): at 12:03

## 2025-07-20 RX ADMIN — ROSUVASTATIN CALCIUM 10 MILLIGRAM(S): 20 TABLET, FILM COATED ORAL at 21:22

## 2025-07-20 RX ADMIN — POLYETHYLENE GLYCOL 3350 17 GRAM(S): 17 POWDER, FOR SOLUTION ORAL at 12:04

## 2025-07-20 RX ADMIN — Medication 1 APPLICATION(S): at 12:01

## 2025-07-20 RX ADMIN — Medication 100 MILLIGRAM(S): at 12:03

## 2025-07-20 RX ADMIN — CLOPIDOGREL BISULFATE 75 MILLIGRAM(S): 75 TABLET, FILM COATED ORAL at 12:03

## 2025-07-20 RX ADMIN — ISOSORBDIE DINITRATE 10 MILLIGRAM(S): 30 TABLET ORAL at 05:23

## 2025-07-20 RX ADMIN — ISOSORBDIE DINITRATE 10 MILLIGRAM(S): 30 TABLET ORAL at 12:04

## 2025-07-20 RX ADMIN — Medication 667 MILLIGRAM(S): at 12:03

## 2025-07-20 RX ADMIN — Medication 75 MILLIGRAM(S): at 05:23

## 2025-07-20 RX ADMIN — METOPROLOL SUCCINATE 12.5 MILLIGRAM(S): 50 TABLET, EXTENDED RELEASE ORAL at 12:03

## 2025-07-20 RX ADMIN — GABAPENTIN 300 MILLIGRAM(S): 400 CAPSULE ORAL at 21:22

## 2025-07-20 RX ADMIN — IPRATROPIUM BROMIDE AND ALBUTEROL SULFATE 3 MILLILITER(S): .5; 2.5 SOLUTION RESPIRATORY (INHALATION) at 12:04

## 2025-07-20 RX ADMIN — GABAPENTIN 300 MILLIGRAM(S): 400 CAPSULE ORAL at 05:26

## 2025-07-20 RX ADMIN — Medication 40 MILLIGRAM(S): at 05:24

## 2025-07-20 RX ADMIN — FUROSEMIDE 80 MILLIGRAM(S): 10 INJECTION INTRAMUSCULAR; INTRAVENOUS at 05:23

## 2025-07-20 RX ADMIN — FUROSEMIDE 80 MILLIGRAM(S): 10 INJECTION INTRAMUSCULAR; INTRAVENOUS at 17:33

## 2025-07-20 RX ADMIN — ISOSORBDIE DINITRATE 10 MILLIGRAM(S): 30 TABLET ORAL at 17:33

## 2025-07-20 RX ADMIN — Medication 667 MILLIGRAM(S): at 17:32

## 2025-07-20 RX ADMIN — GABAPENTIN 300 MILLIGRAM(S): 400 CAPSULE ORAL at 12:02

## 2025-07-20 RX ADMIN — Medication 75 MILLIGRAM(S): at 21:22

## 2025-07-21 LAB
ADD ON TEST-SPECIMEN IN LAB: SIGNIFICANT CHANGE UP
ANION GAP SERPL CALC-SCNC: 15 MMOL/L — SIGNIFICANT CHANGE UP (ref 5–17)
APTT BLD: 57.1 SEC — HIGH (ref 26.1–36.8)
APTT BLD: 64.8 SEC — HIGH (ref 26.1–36.8)
BUN SERPL-MCNC: 41 MG/DL — HIGH (ref 7–23)
CALCIUM SERPL-MCNC: 8.4 MG/DL — SIGNIFICANT CHANGE UP (ref 8.4–10.5)
CHLORIDE SERPL-SCNC: 99 MMOL/L — SIGNIFICANT CHANGE UP (ref 96–108)
CO2 SERPL-SCNC: 27 MMOL/L — SIGNIFICANT CHANGE UP (ref 22–31)
CREAT SERPL-MCNC: 2.93 MG/DL — HIGH (ref 0.5–1.3)
EGFR: 20 ML/MIN/1.73M2 — LOW
EGFR: 20 ML/MIN/1.73M2 — LOW
FERRITIN SERPL-MCNC: 396 NG/ML — SIGNIFICANT CHANGE UP (ref 30–400)
GLUCOSE SERPL-MCNC: 167 MG/DL — HIGH (ref 70–99)
HCT VFR BLD CALC: 20.6 % — CRITICAL LOW (ref 39–50)
HCT VFR BLD CALC: 20.6 % — CRITICAL LOW (ref 39–50)
HCT VFR BLD CALC: 22.4 % — LOW (ref 39–50)
HGB BLD-MCNC: 6.7 G/DL — CRITICAL LOW (ref 13–17)
HGB BLD-MCNC: 6.8 G/DL — CRITICAL LOW (ref 13–17)
HGB BLD-MCNC: 7.3 G/DL — LOW (ref 13–17)
IMMATURE PLATELET FRACTION #: 1.2 K/UL — LOW (ref 3.9–12.5)
IMMATURE PLATELET FRACTION #: 1.4 K/UL — LOW (ref 3.9–12.5)
IMMATURE PLATELET FRACTION #: 1.5 K/UL — LOW (ref 3.9–12.5)
IMMATURE PLATELET FRACTION %: 2.4 % — SIGNIFICANT CHANGE UP (ref 1.6–7.1)
IMMATURE PLATELET FRACTION %: 3 % — SIGNIFICANT CHANGE UP (ref 1.6–7.1)
IMMATURE PLATELET FRACTION %: 3.2 % — SIGNIFICANT CHANGE UP (ref 1.6–7.1)
MCHC RBC-ENTMCNC: 32.5 G/DL — SIGNIFICANT CHANGE UP (ref 32–36)
MCHC RBC-ENTMCNC: 32.6 G/DL — SIGNIFICANT CHANGE UP (ref 32–36)
MCHC RBC-ENTMCNC: 33 G/DL — SIGNIFICANT CHANGE UP (ref 32–36)
MCHC RBC-ENTMCNC: 35.1 PG — HIGH (ref 27–34)
MCHC RBC-ENTMCNC: 35.3 PG — HIGH (ref 27–34)
MCHC RBC-ENTMCNC: 35.6 PG — HIGH (ref 27–34)
MCV RBC AUTO: 107.9 FL — HIGH (ref 80–100)
MCV RBC AUTO: 107.9 FL — HIGH (ref 80–100)
MCV RBC AUTO: 108.2 FL — HIGH (ref 80–100)
MRSA PCR RESULT.: SIGNIFICANT CHANGE UP
NRBC # BLD AUTO: 0.02 K/UL — HIGH (ref 0–0)
NRBC # BLD AUTO: 0.02 K/UL — HIGH (ref 0–0)
NRBC # BLD AUTO: 0.04 K/UL — HIGH (ref 0–0)
NRBC # FLD: 0.02 K/UL — HIGH (ref 0–0)
NRBC # FLD: 0.02 K/UL — HIGH (ref 0–0)
NRBC # FLD: 0.04 K/UL — HIGH (ref 0–0)
NRBC BLD AUTO-RTO: 2 /100 WBCS — HIGH (ref 0–0)
NRBC BLD AUTO-RTO: 2 /100 WBCS — HIGH (ref 0–0)
NRBC BLD AUTO-RTO: 3 /100 WBCS — HIGH (ref 0–0)
PLATELET # BLD AUTO: 46 K/UL — LOW (ref 150–400)
PLATELET # BLD AUTO: 46 K/UL — LOW (ref 150–400)
PLATELET # BLD AUTO: 48 K/UL — LOW (ref 150–400)
PMV BLD: 10 FL — SIGNIFICANT CHANGE UP (ref 7–13)
PMV BLD: 10.6 FL — SIGNIFICANT CHANGE UP (ref 7–13)
PMV BLD: 10.8 FL — SIGNIFICANT CHANGE UP (ref 7–13)
POTASSIUM SERPL-MCNC: 3.7 MMOL/L — SIGNIFICANT CHANGE UP (ref 3.5–5.3)
POTASSIUM SERPL-SCNC: 3.7 MMOL/L — SIGNIFICANT CHANGE UP (ref 3.5–5.3)
RBC # BLD: 1.91 M/UL — LOW (ref 4.2–5.8)
RBC # BLD: 1.91 M/UL — LOW (ref 4.2–5.8)
RBC # BLD: 2.07 M/UL — LOW (ref 4.2–5.8)
RBC # FLD: 24.1 % — HIGH (ref 10.3–14.5)
RBC # FLD: 24.4 % — HIGH (ref 10.3–14.5)
RBC # FLD: 24.6 % — HIGH (ref 10.3–14.5)
S AUREUS DNA NOSE QL NAA+PROBE: SIGNIFICANT CHANGE UP
SODIUM SERPL-SCNC: 141 MMOL/L — SIGNIFICANT CHANGE UP (ref 135–145)
TRANSFERRIN SERPL-MCNC: 159 MG/DL — LOW (ref 200–360)
TROPONIN T, HIGH SENSITIVITY RESULT: 196 NG/L — HIGH (ref 0–51)
WBC # BLD: 1.02 K/UL — LOW (ref 3.8–10.5)
WBC # BLD: 1.04 K/UL — LOW (ref 3.8–10.5)
WBC # BLD: 1.33 K/UL — LOW (ref 3.8–10.5)
WBC # FLD AUTO: 1.02 K/UL — LOW (ref 3.8–10.5)
WBC # FLD AUTO: 1.04 K/UL — LOW (ref 3.8–10.5)
WBC # FLD AUTO: 1.33 K/UL — LOW (ref 3.8–10.5)

## 2025-07-21 PROCEDURE — 82330 ASSAY OF CALCIUM: CPT

## 2025-07-21 PROCEDURE — 36600 WITHDRAWAL OF ARTERIAL BLOOD: CPT

## 2025-07-21 PROCEDURE — 82803 BLOOD GASES ANY COMBINATION: CPT

## 2025-07-21 PROCEDURE — 84295 ASSAY OF SERUM SODIUM: CPT

## 2025-07-21 PROCEDURE — 93306 TTE W/DOPPLER COMPLETE: CPT

## 2025-07-21 PROCEDURE — 93005 ELECTROCARDIOGRAM TRACING: CPT

## 2025-07-21 PROCEDURE — 84466 ASSAY OF TRANSFERRIN: CPT

## 2025-07-21 PROCEDURE — 80053 COMPREHEN METABOLIC PANEL: CPT

## 2025-07-21 PROCEDURE — 84100 ASSAY OF PHOSPHORUS: CPT

## 2025-07-21 PROCEDURE — 81001 URINALYSIS AUTO W/SCOPE: CPT

## 2025-07-21 PROCEDURE — 85610 PROTHROMBIN TIME: CPT

## 2025-07-21 PROCEDURE — 85027 COMPLETE CBC AUTOMATED: CPT

## 2025-07-21 PROCEDURE — 93970 EXTREMITY STUDY: CPT

## 2025-07-21 PROCEDURE — 93010 ELECTROCARDIOGRAM REPORT: CPT

## 2025-07-21 PROCEDURE — 86850 RBC ANTIBODY SCREEN: CPT

## 2025-07-21 PROCEDURE — 87640 STAPH A DNA AMP PROBE: CPT

## 2025-07-21 PROCEDURE — 82947 ASSAY GLUCOSE BLOOD QUANT: CPT

## 2025-07-21 PROCEDURE — 86900 BLOOD TYPING SEROLOGIC ABO: CPT

## 2025-07-21 PROCEDURE — 83880 ASSAY OF NATRIURETIC PEPTIDE: CPT

## 2025-07-21 PROCEDURE — 86901 BLOOD TYPING SEROLOGIC RH(D): CPT

## 2025-07-21 PROCEDURE — 87641 MR-STAPH DNA AMP PROBE: CPT

## 2025-07-21 PROCEDURE — 82435 ASSAY OF BLOOD CHLORIDE: CPT

## 2025-07-21 PROCEDURE — 36415 COLL VENOUS BLD VENIPUNCTURE: CPT

## 2025-07-21 PROCEDURE — 82553 CREATINE MB FRACTION: CPT

## 2025-07-21 PROCEDURE — 84484 ASSAY OF TROPONIN QUANT: CPT

## 2025-07-21 PROCEDURE — 87637 SARSCOV2&INF A&B&RSV AMP PRB: CPT

## 2025-07-21 PROCEDURE — 99233 SBSQ HOSP IP/OBS HIGH 50: CPT | Mod: GC

## 2025-07-21 PROCEDURE — 87040 BLOOD CULTURE FOR BACTERIA: CPT

## 2025-07-21 PROCEDURE — 85018 HEMOGLOBIN: CPT

## 2025-07-21 PROCEDURE — 82728 ASSAY OF FERRITIN: CPT

## 2025-07-21 PROCEDURE — 71250 CT THORAX DX C-: CPT

## 2025-07-21 PROCEDURE — 85014 HEMATOCRIT: CPT

## 2025-07-21 PROCEDURE — 85025 COMPLETE CBC W/AUTO DIFF WBC: CPT

## 2025-07-21 PROCEDURE — 80048 BASIC METABOLIC PNL TOTAL CA: CPT

## 2025-07-21 PROCEDURE — 83605 ASSAY OF LACTIC ACID: CPT

## 2025-07-21 PROCEDURE — 85730 THROMBOPLASTIN TIME PARTIAL: CPT

## 2025-07-21 PROCEDURE — 84132 ASSAY OF SERUM POTASSIUM: CPT

## 2025-07-21 PROCEDURE — 86923 COMPATIBILITY TEST ELECTRIC: CPT

## 2025-07-21 PROCEDURE — 71045 X-RAY EXAM CHEST 1 VIEW: CPT

## 2025-07-21 PROCEDURE — 83735 ASSAY OF MAGNESIUM: CPT

## 2025-07-21 PROCEDURE — 94640 AIRWAY INHALATION TREATMENT: CPT

## 2025-07-21 RX ORDER — IPRATROPIUM BROMIDE AND ALBUTEROL SULFATE .5; 2.5 MG/3ML; MG/3ML
3 SOLUTION RESPIRATORY (INHALATION) EVERY 6 HOURS
Refills: 0 | Status: DISCONTINUED | OUTPATIENT
Start: 2025-07-21 | End: 2025-07-22

## 2025-07-21 RX ORDER — METOPROLOL SUCCINATE 50 MG/1
25 TABLET, EXTENDED RELEASE ORAL
Refills: 0 | Status: DISCONTINUED | OUTPATIENT
Start: 2025-07-21 | End: 2025-07-25

## 2025-07-21 RX ORDER — FUROSEMIDE 10 MG/ML
80 INJECTION INTRAMUSCULAR; INTRAVENOUS EVERY 12 HOURS
Refills: 0 | Status: DISCONTINUED | OUTPATIENT
Start: 2025-07-21 | End: 2025-07-22

## 2025-07-21 RX ADMIN — GABAPENTIN 300 MILLIGRAM(S): 400 CAPSULE ORAL at 05:25

## 2025-07-21 RX ADMIN — Medication 100 MILLIGRAM(S): at 12:06

## 2025-07-21 RX ADMIN — Medication 667 MILLIGRAM(S): at 09:30

## 2025-07-21 RX ADMIN — ROSUVASTATIN CALCIUM 10 MILLIGRAM(S): 20 TABLET, FILM COATED ORAL at 21:41

## 2025-07-21 RX ADMIN — METOPROLOL SUCCINATE 25 MILLIGRAM(S): 50 TABLET, EXTENDED RELEASE ORAL at 17:08

## 2025-07-21 RX ADMIN — GABAPENTIN 300 MILLIGRAM(S): 400 CAPSULE ORAL at 12:12

## 2025-07-21 RX ADMIN — Medication 2 TABLET(S): at 21:41

## 2025-07-21 RX ADMIN — Medication 667 MILLIGRAM(S): at 17:09

## 2025-07-21 RX ADMIN — FUROSEMIDE 80 MILLIGRAM(S): 10 INJECTION INTRAMUSCULAR; INTRAVENOUS at 17:08

## 2025-07-21 RX ADMIN — CLOPIDOGREL BISULFATE 75 MILLIGRAM(S): 75 TABLET, FILM COATED ORAL at 12:05

## 2025-07-21 RX ADMIN — Medication 75 MILLIGRAM(S): at 12:06

## 2025-07-21 RX ADMIN — IPRATROPIUM BROMIDE AND ALBUTEROL SULFATE 3 MILLILITER(S): .5; 2.5 SOLUTION RESPIRATORY (INHALATION) at 12:12

## 2025-07-21 RX ADMIN — Medication 667 MILLIGRAM(S): at 12:05

## 2025-07-21 RX ADMIN — ISOSORBDIE DINITRATE 10 MILLIGRAM(S): 30 TABLET ORAL at 12:06

## 2025-07-21 RX ADMIN — ISOSORBDIE DINITRATE 10 MILLIGRAM(S): 30 TABLET ORAL at 05:22

## 2025-07-21 RX ADMIN — Medication 75 MILLIGRAM(S): at 05:23

## 2025-07-21 RX ADMIN — HEPARIN SODIUM 18 UNIT(S)/HR: 1000 INJECTION INTRAVENOUS; SUBCUTANEOUS at 00:04

## 2025-07-21 RX ADMIN — POLYETHYLENE GLYCOL 3350 17 GRAM(S): 17 POWDER, FOR SOLUTION ORAL at 21:41

## 2025-07-21 RX ADMIN — FUROSEMIDE 80 MILLIGRAM(S): 10 INJECTION INTRAMUSCULAR; INTRAVENOUS at 05:23

## 2025-07-21 RX ADMIN — Medication 40 MILLIGRAM(S): at 05:22

## 2025-07-21 RX ADMIN — METOPROLOL SUCCINATE 12.5 MILLIGRAM(S): 50 TABLET, EXTENDED RELEASE ORAL at 05:25

## 2025-07-21 RX ADMIN — Medication 75 MILLIGRAM(S): at 21:41

## 2025-07-21 RX ADMIN — ISOSORBDIE DINITRATE 10 MILLIGRAM(S): 30 TABLET ORAL at 17:09

## 2025-07-21 RX ADMIN — HEPARIN SODIUM 18 UNIT(S)/HR: 1000 INJECTION INTRAVENOUS; SUBCUTANEOUS at 05:20

## 2025-07-21 RX ADMIN — Medication 1 APPLICATION(S): at 07:35

## 2025-07-21 RX ADMIN — GABAPENTIN 300 MILLIGRAM(S): 400 CAPSULE ORAL at 21:48

## 2025-07-21 RX ADMIN — HEPARIN SODIUM 18 UNIT(S)/HR: 1000 INJECTION INTRAVENOUS; SUBCUTANEOUS at 13:16

## 2025-07-21 RX ADMIN — Medication 200 MILLIGRAM(S): at 12:06

## 2025-07-22 LAB
ALBUMIN SERPL ELPH-MCNC: 3.4 G/DL — SIGNIFICANT CHANGE UP (ref 3.3–5)
ALP SERPL-CCNC: 71 U/L — SIGNIFICANT CHANGE UP (ref 40–120)
ALT FLD-CCNC: 8 U/L — LOW (ref 10–45)
ANION GAP SERPL CALC-SCNC: 16 MMOL/L — SIGNIFICANT CHANGE UP (ref 5–17)
APTT BLD: 80.4 SEC — HIGH (ref 26.1–36.8)
AST SERPL-CCNC: 12 U/L — SIGNIFICANT CHANGE UP (ref 10–40)
BASOPHILS # BLD AUTO: 0.01 K/UL — SIGNIFICANT CHANGE UP (ref 0–0.2)
BASOPHILS NFR BLD AUTO: 0.9 % — SIGNIFICANT CHANGE UP (ref 0–2)
BILIRUB SERPL-MCNC: 1.5 MG/DL — HIGH (ref 0.2–1.2)
BUN SERPL-MCNC: 44 MG/DL — HIGH (ref 7–23)
CALCIUM SERPL-MCNC: 8.4 MG/DL — SIGNIFICANT CHANGE UP (ref 8.4–10.5)
CHLORIDE SERPL-SCNC: 100 MMOL/L — SIGNIFICANT CHANGE UP (ref 96–108)
CO2 SERPL-SCNC: 26 MMOL/L — SIGNIFICANT CHANGE UP (ref 22–31)
CREAT SERPL-MCNC: 3.09 MG/DL — HIGH (ref 0.5–1.3)
EGFR: 19 ML/MIN/1.73M2 — LOW
EGFR: 19 ML/MIN/1.73M2 — LOW
EOSINOPHIL # BLD AUTO: 0.02 K/UL — SIGNIFICANT CHANGE UP (ref 0–0.5)
EOSINOPHIL NFR BLD AUTO: 1.8 % — SIGNIFICANT CHANGE UP (ref 0–6)
GLUCOSE SERPL-MCNC: 122 MG/DL — HIGH (ref 70–99)
HCT VFR BLD CALC: 23.9 % — LOW (ref 39–50)
HGB BLD-MCNC: 7.8 G/DL — LOW (ref 13–17)
IMM GRANULOCYTES # BLD AUTO: 0 K/UL — SIGNIFICANT CHANGE UP (ref 0–0.07)
IMM GRANULOCYTES NFR BLD AUTO: 0 % — SIGNIFICANT CHANGE UP (ref 0–0.9)
IMMATURE PLATELET FRACTION #: 1.8 K/UL — LOW (ref 3.9–12.5)
IMMATURE PLATELET FRACTION %: 3.2 % — SIGNIFICANT CHANGE UP (ref 1.6–7.1)
IRON SATN MFR SERPL: 18 % — SIGNIFICANT CHANGE UP (ref 16–55)
IRON SATN MFR SERPL: 40 UG/DL — LOW (ref 45–165)
LYMPHOCYTES # BLD AUTO: 0.66 K/UL — LOW (ref 1–3.3)
LYMPHOCYTES NFR BLD AUTO: 58.4 % — HIGH (ref 13–44)
MAGNESIUM SERPL-MCNC: 2.2 MG/DL — SIGNIFICANT CHANGE UP (ref 1.6–2.6)
MCHC RBC-ENTMCNC: 32.6 G/DL — SIGNIFICANT CHANGE UP (ref 32–36)
MCHC RBC-ENTMCNC: 33.5 PG — SIGNIFICANT CHANGE UP (ref 27–34)
MCV RBC AUTO: 102.6 FL — HIGH (ref 80–100)
MONOCYTES # BLD AUTO: 0.08 K/UL — SIGNIFICANT CHANGE UP (ref 0–0.9)
MONOCYTES NFR BLD AUTO: 7.1 % — SIGNIFICANT CHANGE UP (ref 2–14)
NEUTROPHILS # BLD AUTO: 0.36 K/UL — LOW (ref 1.8–7.4)
NEUTROPHILS NFR BLD AUTO: 31.8 % — LOW (ref 43–77)
NRBC # BLD AUTO: 0.03 K/UL — HIGH (ref 0–0)
NRBC # FLD: 0.03 K/UL — HIGH (ref 0–0)
NRBC BLD AUTO-RTO: 3 /100 WBCS — HIGH (ref 0–0)
PHOSPHATE SERPL-MCNC: 4.6 MG/DL — HIGH (ref 2.5–4.5)
PLATELET # BLD AUTO: 57 K/UL — LOW (ref 150–400)
PMV BLD: 11.1 FL — SIGNIFICANT CHANGE UP (ref 7–13)
POTASSIUM SERPL-MCNC: 3.7 MMOL/L — SIGNIFICANT CHANGE UP (ref 3.5–5.3)
POTASSIUM SERPL-SCNC: 3.7 MMOL/L — SIGNIFICANT CHANGE UP (ref 3.5–5.3)
PROT SERPL-MCNC: 6.1 G/DL — SIGNIFICANT CHANGE UP (ref 6–8.3)
RBC # BLD: 2.33 M/UL — LOW (ref 4.2–5.8)
RBC # FLD: 26 % — HIGH (ref 10.3–14.5)
SODIUM SERPL-SCNC: 142 MMOL/L — SIGNIFICANT CHANGE UP (ref 135–145)
TIBC SERPL-MCNC: 218 UG/DL — LOW (ref 220–430)
UIBC SERPL-MCNC: 178 UG/DL — SIGNIFICANT CHANGE UP (ref 110–370)
WBC # BLD: 1.13 K/UL — LOW (ref 3.8–10.5)
WBC # FLD AUTO: 1.13 K/UL — LOW (ref 3.8–10.5)

## 2025-07-22 PROCEDURE — 99233 SBSQ HOSP IP/OBS HIGH 50: CPT | Mod: GC

## 2025-07-22 PROCEDURE — 71045 X-RAY EXAM CHEST 1 VIEW: CPT | Mod: 26

## 2025-07-22 RX ORDER — IPRATROPIUM BROMIDE AND ALBUTEROL SULFATE .5; 2.5 MG/3ML; MG/3ML
3 SOLUTION RESPIRATORY (INHALATION) EVERY 6 HOURS
Refills: 0 | Status: DISCONTINUED | OUTPATIENT
Start: 2025-07-22 | End: 2025-07-27

## 2025-07-22 RX ORDER — FUROSEMIDE 10 MG/ML
80 INJECTION INTRAMUSCULAR; INTRAVENOUS DAILY
Refills: 0 | Status: DISCONTINUED | OUTPATIENT
Start: 2025-07-23 | End: 2025-07-23

## 2025-07-22 RX ORDER — HEPARIN SODIUM 1000 [USP'U]/ML
INJECTION INTRAVENOUS; SUBCUTANEOUS
Qty: 25000 | Refills: 0 | Status: DISCONTINUED | OUTPATIENT
Start: 2025-07-22 | End: 2025-07-23

## 2025-07-22 RX ADMIN — Medication 75 MILLIGRAM(S): at 21:21

## 2025-07-22 RX ADMIN — METOPROLOL SUCCINATE 25 MILLIGRAM(S): 50 TABLET, EXTENDED RELEASE ORAL at 18:26

## 2025-07-22 RX ADMIN — METOPROLOL SUCCINATE 25 MILLIGRAM(S): 50 TABLET, EXTENDED RELEASE ORAL at 05:55

## 2025-07-22 RX ADMIN — Medication 1 APPLICATION(S): at 13:01

## 2025-07-22 RX ADMIN — HEPARIN SODIUM 1600 UNIT(S)/HR: 1000 INJECTION INTRAVENOUS; SUBCUTANEOUS at 20:07

## 2025-07-22 RX ADMIN — Medication 667 MILLIGRAM(S): at 12:57

## 2025-07-22 RX ADMIN — Medication 75 MILLIGRAM(S): at 05:51

## 2025-07-22 RX ADMIN — GABAPENTIN 300 MILLIGRAM(S): 400 CAPSULE ORAL at 21:24

## 2025-07-22 RX ADMIN — ISOSORBDIE DINITRATE 10 MILLIGRAM(S): 30 TABLET ORAL at 21:22

## 2025-07-22 RX ADMIN — GABAPENTIN 300 MILLIGRAM(S): 400 CAPSULE ORAL at 16:42

## 2025-07-22 RX ADMIN — IPRATROPIUM BROMIDE AND ALBUTEROL SULFATE 3 MILLILITER(S): .5; 2.5 SOLUTION RESPIRATORY (INHALATION) at 18:25

## 2025-07-22 RX ADMIN — CLOPIDOGREL BISULFATE 75 MILLIGRAM(S): 75 TABLET, FILM COATED ORAL at 12:58

## 2025-07-22 RX ADMIN — HEPARIN SODIUM 1600 UNIT(S)/HR: 1000 INJECTION INTRAVENOUS; SUBCUTANEOUS at 21:21

## 2025-07-22 RX ADMIN — GABAPENTIN 300 MILLIGRAM(S): 400 CAPSULE ORAL at 05:55

## 2025-07-22 RX ADMIN — ROSUVASTATIN CALCIUM 10 MILLIGRAM(S): 20 TABLET, FILM COATED ORAL at 21:22

## 2025-07-22 RX ADMIN — Medication 100 MILLIGRAM(S): at 12:57

## 2025-07-22 RX ADMIN — ISOSORBDIE DINITRATE 10 MILLIGRAM(S): 30 TABLET ORAL at 12:57

## 2025-07-22 RX ADMIN — Medication 40 MILLIGRAM(S): at 05:50

## 2025-07-22 RX ADMIN — Medication 667 MILLIGRAM(S): at 18:25

## 2025-07-22 RX ADMIN — ISOSORBDIE DINITRATE 10 MILLIGRAM(S): 30 TABLET ORAL at 05:51

## 2025-07-22 RX ADMIN — Medication 75 MILLIGRAM(S): at 16:42

## 2025-07-22 RX ADMIN — FUROSEMIDE 80 MILLIGRAM(S): 10 INJECTION INTRAMUSCULAR; INTRAVENOUS at 05:49

## 2025-07-22 RX ADMIN — POLYETHYLENE GLYCOL 3350 17 GRAM(S): 17 POWDER, FOR SOLUTION ORAL at 12:57

## 2025-07-22 RX ADMIN — Medication 2 TABLET(S): at 21:22

## 2025-07-22 RX ADMIN — Medication 200 MILLIGRAM(S): at 12:58

## 2025-07-23 LAB
ALBUMIN SERPL ELPH-MCNC: 3.4 G/DL — SIGNIFICANT CHANGE UP (ref 3.3–5)
ALP SERPL-CCNC: 84 U/L — SIGNIFICANT CHANGE UP (ref 40–120)
ALT FLD-CCNC: 11 U/L — SIGNIFICANT CHANGE UP (ref 10–45)
ANION GAP SERPL CALC-SCNC: 16 MMOL/L — SIGNIFICANT CHANGE UP (ref 5–17)
ANISOCYTOSIS BLD QL: SLIGHT — SIGNIFICANT CHANGE UP
APTT BLD: 61 SEC — HIGH (ref 26.1–36.8)
AST SERPL-CCNC: 15 U/L — SIGNIFICANT CHANGE UP (ref 10–40)
BASE EXCESS BLDA CALC-SCNC: 6.2 MMOL/L — HIGH (ref -2–3)
BASO STIPL BLD QL SMEAR: PRESENT
BASOPHILS # BLD AUTO: 0 K/UL — SIGNIFICANT CHANGE UP (ref 0–0.2)
BASOPHILS # BLD MANUAL: 0.01 K/UL — SIGNIFICANT CHANGE UP (ref 0–0.2)
BASOPHILS NFR BLD AUTO: 0 % — SIGNIFICANT CHANGE UP (ref 0–2)
BASOPHILS NFR BLD MANUAL: 0.8 % — SIGNIFICANT CHANGE UP (ref 0–2)
BILIRUB SERPL-MCNC: 0.8 MG/DL — SIGNIFICANT CHANGE UP (ref 0.2–1.2)
BUN SERPL-MCNC: 48 MG/DL — HIGH (ref 7–23)
CALCIUM SERPL-MCNC: 8.3 MG/DL — LOW (ref 8.4–10.5)
CHLORIDE SERPL-SCNC: 100 MMOL/L — SIGNIFICANT CHANGE UP (ref 96–108)
CO2 BLDA-SCNC: 31 MMOL/L — HIGH (ref 19–24)
CO2 SERPL-SCNC: 25 MMOL/L — SIGNIFICANT CHANGE UP (ref 22–31)
CREAT SERPL-MCNC: 3.31 MG/DL — HIGH (ref 0.5–1.3)
CULTURE RESULTS: SIGNIFICANT CHANGE UP
CULTURE RESULTS: SIGNIFICANT CHANGE UP
DACRYOCYTES BLD QL SMEAR: SLIGHT — SIGNIFICANT CHANGE UP
EGFR: 18 ML/MIN/1.73M2 — LOW
EGFR: 18 ML/MIN/1.73M2 — LOW
ELLIPTOCYTES BLD QL SMEAR: SLIGHT — SIGNIFICANT CHANGE UP
EOSINOPHIL # BLD AUTO: 0.02 K/UL — SIGNIFICANT CHANGE UP (ref 0–0.5)
EOSINOPHIL # BLD MANUAL: 0.02 K/UL — SIGNIFICANT CHANGE UP (ref 0–0.5)
EOSINOPHIL NFR BLD AUTO: 1.8 % — SIGNIFICANT CHANGE UP (ref 0–6)
EOSINOPHIL NFR BLD MANUAL: 1.7 % — SIGNIFICANT CHANGE UP (ref 0–6)
GAS PNL BLDA: SIGNIFICANT CHANGE UP
GIANT PLATELETS BLD QL SMEAR: PRESENT
GLUCOSE SERPL-MCNC: 125 MG/DL — HIGH (ref 70–99)
HCO3 BLDA-SCNC: 30 MMOL/L — HIGH (ref 21–28)
HCT VFR BLD CALC: 23.2 % — LOW (ref 39–50)
HCT VFR BLD CALC: 23.2 % — LOW (ref 39–50)
HGB BLD-MCNC: 7.7 G/DL — LOW (ref 13–17)
HGB BLD-MCNC: 7.8 G/DL — LOW (ref 13–17)
HOROWITZ INDEX BLDA+IHG-RTO: 32 — SIGNIFICANT CHANGE UP
IMM GRANULOCYTES # BLD AUTO: 0.01 K/UL — SIGNIFICANT CHANGE UP (ref 0–0.07)
IMM GRANULOCYTES NFR BLD AUTO: 0.9 % — SIGNIFICANT CHANGE UP (ref 0–0.9)
IMMATURE PLATELET FRACTION #: 1.8 K/UL — LOW (ref 3.9–12.5)
IMMATURE PLATELET FRACTION #: 2.3 K/UL — LOW (ref 3.9–12.5)
IMMATURE PLATELET FRACTION %: 2.3 % — SIGNIFICANT CHANGE UP (ref 1.6–7.1)
IMMATURE PLATELET FRACTION %: 2.9 % — SIGNIFICANT CHANGE UP (ref 1.6–7.1)
LYMPHOCYTES # BLD AUTO: 0.62 K/UL — LOW (ref 1–3.3)
LYMPHOCYTES # BLD MANUAL: 0.74 K/UL — LOW (ref 1–3.3)
LYMPHOCYTES NFR BLD AUTO: 56.9 % — HIGH (ref 13–44)
LYMPHOCYTES NFR BLD MANUAL: 67.9 % — HIGH (ref 13–44)
MAGNESIUM SERPL-MCNC: 2.3 MG/DL — SIGNIFICANT CHANGE UP (ref 1.6–2.6)
MANUAL NEUTROPHIL BANDS #: 0.03 K/UL — SIGNIFICANT CHANGE UP (ref 0–0.84)
MANUAL NRBC #: 0.02 K/UL — HIGH (ref 0–0)
MCHC RBC-ENTMCNC: 33.2 G/DL — SIGNIFICANT CHANGE UP (ref 32–36)
MCHC RBC-ENTMCNC: 33.6 G/DL — SIGNIFICANT CHANGE UP (ref 32–36)
MCHC RBC-ENTMCNC: 34.2 PG — HIGH (ref 27–34)
MCHC RBC-ENTMCNC: 35 PG — HIGH (ref 27–34)
MCV RBC AUTO: 103.1 FL — HIGH (ref 80–100)
MCV RBC AUTO: 104 FL — HIGH (ref 80–100)
MONOCYTES # BLD AUTO: 0.05 K/UL — SIGNIFICANT CHANGE UP (ref 0–0.9)
MONOCYTES # BLD MANUAL: 0.01 K/UL — SIGNIFICANT CHANGE UP (ref 0–0.9)
MONOCYTES NFR BLD AUTO: 4.6 % — SIGNIFICANT CHANGE UP (ref 2–14)
MONOCYTES NFR BLD MANUAL: 0.8 % — LOW (ref 2–14)
NEUTROPHILS # BLD AUTO: 0.39 K/UL — LOW (ref 1.8–7.4)
NEUTROPHILS # BLD MANUAL: 0.29 K/UL — LOW (ref 1.8–7.4)
NEUTROPHILS NFR BLD AUTO: 35.8 % — LOW (ref 43–77)
NEUTROPHILS NFR BLD MANUAL: 26.3 % — LOW (ref 43–77)
NEUTS BAND # BLD: 2.5 % — SIGNIFICANT CHANGE UP (ref 0–8)
NEUTS BAND NFR BLD: 2.5 % — SIGNIFICANT CHANGE UP (ref 0–8)
NRBC # BLD AUTO: 0.02 K/UL — HIGH (ref 0–0)
NRBC # BLD AUTO: 0.02 K/UL — HIGH (ref 0–0)
NRBC # BLD: 2 /100 WBCS — HIGH (ref 0–0)
NRBC # FLD: 0.02 K/UL — HIGH (ref 0–0)
NRBC # FLD: 0.02 K/UL — HIGH (ref 0–0)
NRBC BLD AUTO-RTO: 2 /100 WBCS — HIGH (ref 0–0)
NRBC BLD AUTO-RTO: 2 /100 WBCS — HIGH (ref 0–0)
NRBC BLD-RTO: 2 /100 WBCS — HIGH (ref 0–0)
PCO2 BLDA: 37 MMHG — SIGNIFICANT CHANGE UP (ref 35–48)
PH BLDA: 7.51 — HIGH (ref 7.35–7.45)
PHOSPHATE SERPL-MCNC: 4.6 MG/DL — HIGH (ref 2.5–4.5)
PLAT MORPH BLD: NORMAL — SIGNIFICANT CHANGE UP
PLATELET # BLD AUTO: 79 K/UL — LOW (ref 150–400)
PLATELET # BLD AUTO: 79 K/UL — LOW (ref 150–400)
PMV BLD: 10.6 FL — SIGNIFICANT CHANGE UP (ref 7–13)
PMV BLD: 9.8 FL — SIGNIFICANT CHANGE UP (ref 7–13)
PO2 BLDA: 89 MMHG — SIGNIFICANT CHANGE UP (ref 83–108)
POIKILOCYTOSIS BLD QL AUTO: SLIGHT — SIGNIFICANT CHANGE UP
POLYCHROMASIA BLD QL SMEAR: SLIGHT — SIGNIFICANT CHANGE UP
POTASSIUM SERPL-MCNC: 3.8 MMOL/L — SIGNIFICANT CHANGE UP (ref 3.5–5.3)
POTASSIUM SERPL-SCNC: 3.8 MMOL/L — SIGNIFICANT CHANGE UP (ref 3.5–5.3)
PROT SERPL-MCNC: 6.2 G/DL — SIGNIFICANT CHANGE UP (ref 6–8.3)
RBC # BLD: 2.23 M/UL — LOW (ref 4.2–5.8)
RBC # BLD: 2.25 M/UL — LOW (ref 4.2–5.8)
RBC # FLD: 25.4 % — HIGH (ref 10.3–14.5)
RBC # FLD: 25.6 % — HIGH (ref 10.3–14.5)
RBC BLD AUTO: ABNORMAL
SAO2 % BLDA: 97.3 % — SIGNIFICANT CHANGE UP (ref 94–98)
SODIUM SERPL-SCNC: 141 MMOL/L — SIGNIFICANT CHANGE UP (ref 135–145)
SPECIMEN SOURCE: SIGNIFICANT CHANGE UP
SPECIMEN SOURCE: SIGNIFICANT CHANGE UP
WBC # BLD: 1.09 K/UL — LOW (ref 3.8–10.5)
WBC # BLD: 1.13 K/UL — LOW (ref 3.8–10.5)
WBC # FLD AUTO: 1.09 K/UL — LOW (ref 3.8–10.5)
WBC # FLD AUTO: 1.13 K/UL — LOW (ref 3.8–10.5)

## 2025-07-23 PROCEDURE — 99232 SBSQ HOSP IP/OBS MODERATE 35: CPT | Mod: GC

## 2025-07-23 PROCEDURE — 99233 SBSQ HOSP IP/OBS HIGH 50: CPT | Mod: GC

## 2025-07-23 RX ORDER — FERROUS SULFATE 137(45) MG
325 TABLET, EXTENDED RELEASE ORAL
Refills: 0 | Status: DISCONTINUED | OUTPATIENT
Start: 2025-07-23 | End: 2025-07-27

## 2025-07-23 RX ORDER — APIXABAN 5 MG/1
5 TABLET, FILM COATED ORAL EVERY 12 HOURS
Refills: 0 | Status: DISCONTINUED | OUTPATIENT
Start: 2025-07-23 | End: 2025-07-27

## 2025-07-23 RX ADMIN — Medication 325 MILLIGRAM(S): at 12:34

## 2025-07-23 RX ADMIN — Medication 667 MILLIGRAM(S): at 12:30

## 2025-07-23 RX ADMIN — CLOPIDOGREL BISULFATE 75 MILLIGRAM(S): 75 TABLET, FILM COATED ORAL at 12:28

## 2025-07-23 RX ADMIN — POLYETHYLENE GLYCOL 3350 17 GRAM(S): 17 POWDER, FOR SOLUTION ORAL at 12:30

## 2025-07-23 RX ADMIN — IPRATROPIUM BROMIDE AND ALBUTEROL SULFATE 3 MILLILITER(S): .5; 2.5 SOLUTION RESPIRATORY (INHALATION) at 00:00

## 2025-07-23 RX ADMIN — GABAPENTIN 300 MILLIGRAM(S): 400 CAPSULE ORAL at 14:50

## 2025-07-23 RX ADMIN — Medication 500 MILLIGRAM(S): at 12:33

## 2025-07-23 RX ADMIN — Medication 1 APPLICATION(S): at 12:34

## 2025-07-23 RX ADMIN — IPRATROPIUM BROMIDE AND ALBUTEROL SULFATE 3 MILLILITER(S): .5; 2.5 SOLUTION RESPIRATORY (INHALATION) at 05:28

## 2025-07-23 RX ADMIN — Medication 667 MILLIGRAM(S): at 18:22

## 2025-07-23 RX ADMIN — Medication 200 MILLIGRAM(S): at 12:29

## 2025-07-23 RX ADMIN — FUROSEMIDE 80 MILLIGRAM(S): 10 INJECTION INTRAMUSCULAR; INTRAVENOUS at 05:32

## 2025-07-23 RX ADMIN — Medication 75 MILLIGRAM(S): at 05:28

## 2025-07-23 RX ADMIN — IPRATROPIUM BROMIDE AND ALBUTEROL SULFATE 3 MILLILITER(S): .5; 2.5 SOLUTION RESPIRATORY (INHALATION) at 12:30

## 2025-07-23 RX ADMIN — GABAPENTIN 300 MILLIGRAM(S): 400 CAPSULE ORAL at 05:32

## 2025-07-23 RX ADMIN — ROSUVASTATIN CALCIUM 10 MILLIGRAM(S): 20 TABLET, FILM COATED ORAL at 21:24

## 2025-07-23 RX ADMIN — GABAPENTIN 300 MILLIGRAM(S): 400 CAPSULE ORAL at 21:25

## 2025-07-23 RX ADMIN — HEPARIN SODIUM 1600 UNIT(S)/HR: 1000 INJECTION INTRAVENOUS; SUBCUTANEOUS at 04:06

## 2025-07-23 RX ADMIN — ISOSORBDIE DINITRATE 10 MILLIGRAM(S): 30 TABLET ORAL at 12:28

## 2025-07-23 RX ADMIN — Medication 75 MILLIGRAM(S): at 21:24

## 2025-07-23 RX ADMIN — Medication 40 MILLIGRAM(S): at 05:28

## 2025-07-23 RX ADMIN — HEPARIN SODIUM 1600 UNIT(S)/HR: 1000 INJECTION INTRAVENOUS; SUBCUTANEOUS at 09:43

## 2025-07-23 RX ADMIN — IPRATROPIUM BROMIDE AND ALBUTEROL SULFATE 3 MILLILITER(S): .5; 2.5 SOLUTION RESPIRATORY (INHALATION) at 23:58

## 2025-07-23 RX ADMIN — METOPROLOL SUCCINATE 25 MILLIGRAM(S): 50 TABLET, EXTENDED RELEASE ORAL at 05:28

## 2025-07-23 RX ADMIN — APIXABAN 5 MILLIGRAM(S): 5 TABLET, FILM COATED ORAL at 18:21

## 2025-07-23 RX ADMIN — Medication 667 MILLIGRAM(S): at 09:45

## 2025-07-23 RX ADMIN — ISOSORBDIE DINITRATE 10 MILLIGRAM(S): 30 TABLET ORAL at 05:27

## 2025-07-23 RX ADMIN — Medication 2 TABLET(S): at 21:24

## 2025-07-23 RX ADMIN — ISOSORBDIE DINITRATE 10 MILLIGRAM(S): 30 TABLET ORAL at 21:24

## 2025-07-23 RX ADMIN — METOPROLOL SUCCINATE 25 MILLIGRAM(S): 50 TABLET, EXTENDED RELEASE ORAL at 18:22

## 2025-07-23 RX ADMIN — Medication 75 MILLIGRAM(S): at 14:50

## 2025-07-23 RX ADMIN — Medication 100 MILLIGRAM(S): at 12:30

## 2025-07-23 RX ADMIN — IPRATROPIUM BROMIDE AND ALBUTEROL SULFATE 3 MILLILITER(S): .5; 2.5 SOLUTION RESPIRATORY (INHALATION) at 18:22

## 2025-07-24 LAB
ANION GAP SERPL CALC-SCNC: 16 MMOL/L — SIGNIFICANT CHANGE UP (ref 5–17)
BUN SERPL-MCNC: 51 MG/DL — HIGH (ref 7–23)
CALCIUM SERPL-MCNC: 8.4 MG/DL — SIGNIFICANT CHANGE UP (ref 8.4–10.5)
CHLORIDE SERPL-SCNC: 100 MMOL/L — SIGNIFICANT CHANGE UP (ref 96–108)
CO2 SERPL-SCNC: 26 MMOL/L — SIGNIFICANT CHANGE UP (ref 22–31)
CREAT SERPL-MCNC: 3.33 MG/DL — HIGH (ref 0.5–1.3)
EGFR: 18 ML/MIN/1.73M2 — LOW
EGFR: 18 ML/MIN/1.73M2 — LOW
GLUCOSE SERPL-MCNC: 127 MG/DL — HIGH (ref 70–99)
HCT VFR BLD CALC: 23.3 % — LOW (ref 39–50)
HGB BLD-MCNC: 7.6 G/DL — LOW (ref 13–17)
IMMATURE PLATELET FRACTION #: 2.7 K/UL — LOW (ref 3.9–12.5)
IMMATURE PLATELET FRACTION %: 2.5 % — SIGNIFICANT CHANGE UP (ref 1.6–7.1)
MAGNESIUM SERPL-MCNC: 2.5 MG/DL — SIGNIFICANT CHANGE UP (ref 1.6–2.6)
MCHC RBC-ENTMCNC: 32.6 G/DL — SIGNIFICANT CHANGE UP (ref 32–36)
MCHC RBC-ENTMCNC: 34.2 PG — HIGH (ref 27–34)
MCV RBC AUTO: 105 FL — HIGH (ref 80–100)
NRBC # BLD AUTO: 0 K/UL — SIGNIFICANT CHANGE UP (ref 0–0)
NRBC # FLD: 0 K/UL — SIGNIFICANT CHANGE UP (ref 0–0)
NRBC BLD AUTO-RTO: 0 /100 WBCS — SIGNIFICANT CHANGE UP (ref 0–0)
PHOSPHATE SERPL-MCNC: 5.7 MG/DL — HIGH (ref 2.5–4.5)
PLATELET # BLD AUTO: 108 K/UL — LOW (ref 150–400)
PMV BLD: 10.1 FL — SIGNIFICANT CHANGE UP (ref 7–13)
POTASSIUM SERPL-MCNC: 3.9 MMOL/L — SIGNIFICANT CHANGE UP (ref 3.5–5.3)
POTASSIUM SERPL-SCNC: 3.9 MMOL/L — SIGNIFICANT CHANGE UP (ref 3.5–5.3)
RBC # BLD: 2.22 M/UL — LOW (ref 4.2–5.8)
RBC # FLD: 24.6 % — HIGH (ref 10.3–14.5)
SODIUM SERPL-SCNC: 142 MMOL/L — SIGNIFICANT CHANGE UP (ref 135–145)
WBC # BLD: 1.07 K/UL — LOW (ref 3.8–10.5)
WBC # FLD AUTO: 1.07 K/UL — LOW (ref 3.8–10.5)

## 2025-07-24 PROCEDURE — 99232 SBSQ HOSP IP/OBS MODERATE 35: CPT | Mod: GC

## 2025-07-24 RX ORDER — FUROSEMIDE 10 MG/ML
80 INJECTION INTRAMUSCULAR; INTRAVENOUS DAILY
Refills: 0 | Status: DISCONTINUED | OUTPATIENT
Start: 2025-07-24 | End: 2025-07-27

## 2025-07-24 RX ADMIN — Medication 2 TABLET(S): at 21:22

## 2025-07-24 RX ADMIN — IPRATROPIUM BROMIDE AND ALBUTEROL SULFATE 3 MILLILITER(S): .5; 2.5 SOLUTION RESPIRATORY (INHALATION) at 05:31

## 2025-07-24 RX ADMIN — APIXABAN 5 MILLIGRAM(S): 5 TABLET, FILM COATED ORAL at 17:43

## 2025-07-24 RX ADMIN — CLOPIDOGREL BISULFATE 75 MILLIGRAM(S): 75 TABLET, FILM COATED ORAL at 11:21

## 2025-07-24 RX ADMIN — Medication 200 MILLIGRAM(S): at 11:21

## 2025-07-24 RX ADMIN — Medication 667 MILLIGRAM(S): at 02:41

## 2025-07-24 RX ADMIN — IPRATROPIUM BROMIDE AND ALBUTEROL SULFATE 3 MILLILITER(S): .5; 2.5 SOLUTION RESPIRATORY (INHALATION) at 11:21

## 2025-07-24 RX ADMIN — IPRATROPIUM BROMIDE AND ALBUTEROL SULFATE 3 MILLILITER(S): .5; 2.5 SOLUTION RESPIRATORY (INHALATION) at 17:42

## 2025-07-24 RX ADMIN — METOPROLOL SUCCINATE 25 MILLIGRAM(S): 50 TABLET, EXTENDED RELEASE ORAL at 05:31

## 2025-07-24 RX ADMIN — GABAPENTIN 300 MILLIGRAM(S): 400 CAPSULE ORAL at 21:26

## 2025-07-24 RX ADMIN — Medication 667 MILLIGRAM(S): at 18:48

## 2025-07-24 RX ADMIN — Medication 667 MILLIGRAM(S): at 10:12

## 2025-07-24 RX ADMIN — APIXABAN 5 MILLIGRAM(S): 5 TABLET, FILM COATED ORAL at 05:30

## 2025-07-24 RX ADMIN — Medication 100 MILLIGRAM(S): at 11:22

## 2025-07-24 RX ADMIN — Medication 1 APPLICATION(S): at 17:31

## 2025-07-24 RX ADMIN — Medication 40 MILLIGRAM(S): at 05:31

## 2025-07-24 RX ADMIN — METOPROLOL SUCCINATE 25 MILLIGRAM(S): 50 TABLET, EXTENDED RELEASE ORAL at 17:43

## 2025-07-24 RX ADMIN — ISOSORBDIE DINITRATE 10 MILLIGRAM(S): 30 TABLET ORAL at 05:31

## 2025-07-24 RX ADMIN — ISOSORBDIE DINITRATE 10 MILLIGRAM(S): 30 TABLET ORAL at 11:21

## 2025-07-24 RX ADMIN — ROSUVASTATIN CALCIUM 10 MILLIGRAM(S): 20 TABLET, FILM COATED ORAL at 21:22

## 2025-07-24 RX ADMIN — ISOSORBDIE DINITRATE 10 MILLIGRAM(S): 30 TABLET ORAL at 17:42

## 2025-07-24 RX ADMIN — Medication 75 MILLIGRAM(S): at 21:22

## 2025-07-24 RX ADMIN — Medication 75 MILLIGRAM(S): at 17:43

## 2025-07-24 RX ADMIN — Medication 500 MILLIGRAM(S): at 11:21

## 2025-07-24 RX ADMIN — Medication 75 MILLIGRAM(S): at 05:31

## 2025-07-24 RX ADMIN — GABAPENTIN 300 MILLIGRAM(S): 400 CAPSULE ORAL at 05:30

## 2025-07-24 RX ADMIN — GABAPENTIN 300 MILLIGRAM(S): 400 CAPSULE ORAL at 17:41

## 2025-07-25 LAB
ALBUMIN SERPL ELPH-MCNC: 2.2 G/DL — LOW (ref 3.3–5)
ALBUMIN SERPL ELPH-MCNC: 3.5 G/DL — SIGNIFICANT CHANGE UP (ref 3.3–5)
ALP SERPL-CCNC: 55 U/L — SIGNIFICANT CHANGE UP (ref 40–120)
ALP SERPL-CCNC: 93 U/L — SIGNIFICANT CHANGE UP (ref 40–120)
ALT FLD-CCNC: 15 U/L — SIGNIFICANT CHANGE UP (ref 10–45)
ALT FLD-CCNC: 7 U/L — LOW (ref 10–45)
ANION GAP SERPL CALC-SCNC: 12 MMOL/L — SIGNIFICANT CHANGE UP (ref 5–17)
ANION GAP SERPL CALC-SCNC: 13 MMOL/L — SIGNIFICANT CHANGE UP (ref 5–17)
AST SERPL-CCNC: 10 U/L — SIGNIFICANT CHANGE UP (ref 10–40)
AST SERPL-CCNC: 16 U/L — SIGNIFICANT CHANGE UP (ref 10–40)
BASOPHILS # BLD AUTO: 0.02 K/UL — SIGNIFICANT CHANGE UP (ref 0–0.2)
BASOPHILS NFR BLD AUTO: 1.4 % — SIGNIFICANT CHANGE UP (ref 0–2)
BILIRUB SERPL-MCNC: 0.4 MG/DL — SIGNIFICANT CHANGE UP (ref 0.2–1.2)
BILIRUB SERPL-MCNC: 0.6 MG/DL — SIGNIFICANT CHANGE UP (ref 0.2–1.2)
BUN SERPL-MCNC: 34 MG/DL — HIGH (ref 7–23)
BUN SERPL-MCNC: 54 MG/DL — HIGH (ref 7–23)
CALCIUM SERPL-MCNC: 5 MG/DL — CRITICAL LOW (ref 8.4–10.5)
CALCIUM SERPL-MCNC: 8.5 MG/DL — SIGNIFICANT CHANGE UP (ref 8.4–10.5)
CHLORIDE SERPL-SCNC: 100 MMOL/L — SIGNIFICANT CHANGE UP (ref 96–108)
CHLORIDE SERPL-SCNC: 119 MMOL/L — HIGH (ref 96–108)
CO2 SERPL-SCNC: 14 MMOL/L — LOW (ref 22–31)
CO2 SERPL-SCNC: 26 MMOL/L — SIGNIFICANT CHANGE UP (ref 22–31)
CREAT SERPL-MCNC: 1.92 MG/DL — HIGH (ref 0.5–1.3)
CREAT SERPL-MCNC: 3.07 MG/DL — HIGH (ref 0.5–1.3)
EGFR: 19 ML/MIN/1.73M2 — LOW
EGFR: 19 ML/MIN/1.73M2 — LOW
EGFR: 34 ML/MIN/1.73M2 — LOW
EGFR: 34 ML/MIN/1.73M2 — LOW
EOSINOPHIL # BLD AUTO: 0.01 K/UL — SIGNIFICANT CHANGE UP (ref 0–0.5)
EOSINOPHIL NFR BLD AUTO: 0.7 % — SIGNIFICANT CHANGE UP (ref 0–6)
GLUCOSE SERPL-MCNC: 158 MG/DL — HIGH (ref 70–99)
GLUCOSE SERPL-MCNC: 87 MG/DL — SIGNIFICANT CHANGE UP (ref 70–99)
HCT VFR BLD CALC: 23.8 % — LOW (ref 39–50)
HGB BLD-MCNC: 7.7 G/DL — LOW (ref 13–17)
IMM GRANULOCYTES # BLD AUTO: 0.01 K/UL — SIGNIFICANT CHANGE UP (ref 0–0.07)
IMM GRANULOCYTES NFR BLD AUTO: 0.7 % — SIGNIFICANT CHANGE UP (ref 0–0.9)
LYMPHOCYTES # BLD AUTO: 0.58 K/UL — LOW (ref 1–3.3)
LYMPHOCYTES NFR BLD AUTO: 41.4 % — SIGNIFICANT CHANGE UP (ref 13–44)
MAGNESIUM SERPL-MCNC: 1.5 MG/DL — LOW (ref 1.6–2.6)
MAGNESIUM SERPL-MCNC: 2.5 MG/DL — SIGNIFICANT CHANGE UP (ref 1.6–2.6)
MCHC RBC-ENTMCNC: 32.4 G/DL — SIGNIFICANT CHANGE UP (ref 32–36)
MCHC RBC-ENTMCNC: 34.2 PG — HIGH (ref 27–34)
MCV RBC AUTO: 105.8 FL — HIGH (ref 80–100)
MONOCYTES # BLD AUTO: 0.05 K/UL — SIGNIFICANT CHANGE UP (ref 0–0.9)
MONOCYTES NFR BLD AUTO: 3.6 % — SIGNIFICANT CHANGE UP (ref 2–14)
NEUTROPHILS # BLD AUTO: 0.73 K/UL — LOW (ref 1.8–7.4)
NEUTROPHILS NFR BLD AUTO: 52.2 % — SIGNIFICANT CHANGE UP (ref 43–77)
NRBC # BLD AUTO: 0 K/UL — SIGNIFICANT CHANGE UP (ref 0–0)
NRBC # FLD: 0 K/UL — SIGNIFICANT CHANGE UP (ref 0–0)
NRBC BLD AUTO-RTO: 0 /100 WBCS — SIGNIFICANT CHANGE UP (ref 0–0)
PHOSPHATE SERPL-MCNC: 2.9 MG/DL — SIGNIFICANT CHANGE UP (ref 2.5–4.5)
PHOSPHATE SERPL-MCNC: 4.9 MG/DL — HIGH (ref 2.5–4.5)
PLATELET # BLD AUTO: 129 K/UL — LOW (ref 150–400)
PMV BLD: 10.4 FL — SIGNIFICANT CHANGE UP (ref 7–13)
POTASSIUM SERPL-MCNC: 2.4 MMOL/L — CRITICAL LOW (ref 3.5–5.3)
POTASSIUM SERPL-MCNC: 3.9 MMOL/L — SIGNIFICANT CHANGE UP (ref 3.5–5.3)
POTASSIUM SERPL-SCNC: 2.4 MMOL/L — CRITICAL LOW (ref 3.5–5.3)
POTASSIUM SERPL-SCNC: 3.9 MMOL/L — SIGNIFICANT CHANGE UP (ref 3.5–5.3)
PROT SERPL-MCNC: 3.7 G/DL — LOW (ref 6–8.3)
PROT SERPL-MCNC: 6.2 G/DL — SIGNIFICANT CHANGE UP (ref 6–8.3)
RBC # BLD: 2.25 M/UL — LOW (ref 4.2–5.8)
RBC # FLD: 24.5 % — HIGH (ref 10.3–14.5)
SODIUM SERPL-SCNC: 139 MMOL/L — SIGNIFICANT CHANGE UP (ref 135–145)
SODIUM SERPL-SCNC: 145 MMOL/L — SIGNIFICANT CHANGE UP (ref 135–145)
WBC # BLD: 1.4 K/UL — LOW (ref 3.8–10.5)
WBC # FLD AUTO: 1.4 K/UL — LOW (ref 3.8–10.5)

## 2025-07-25 PROCEDURE — 99233 SBSQ HOSP IP/OBS HIGH 50: CPT | Mod: GC

## 2025-07-25 RX ORDER — METOPROLOL SUCCINATE 50 MG/1
37.5 TABLET, EXTENDED RELEASE ORAL
Refills: 0 | Status: DISCONTINUED | OUTPATIENT
Start: 2025-07-25 | End: 2025-07-27

## 2025-07-25 RX ADMIN — Medication 325 MILLIGRAM(S): at 12:58

## 2025-07-25 RX ADMIN — Medication 75 MILLIGRAM(S): at 21:46

## 2025-07-25 RX ADMIN — APIXABAN 5 MILLIGRAM(S): 5 TABLET, FILM COATED ORAL at 05:41

## 2025-07-25 RX ADMIN — METOPROLOL SUCCINATE 25 MILLIGRAM(S): 50 TABLET, EXTENDED RELEASE ORAL at 05:41

## 2025-07-25 RX ADMIN — FUROSEMIDE 80 MILLIGRAM(S): 10 INJECTION INTRAMUSCULAR; INTRAVENOUS at 05:41

## 2025-07-25 RX ADMIN — Medication 500 MILLIGRAM(S): at 12:59

## 2025-07-25 RX ADMIN — Medication 667 MILLIGRAM(S): at 12:59

## 2025-07-25 RX ADMIN — IPRATROPIUM BROMIDE AND ALBUTEROL SULFATE 3 MILLILITER(S): .5; 2.5 SOLUTION RESPIRATORY (INHALATION) at 17:35

## 2025-07-25 RX ADMIN — GABAPENTIN 300 MILLIGRAM(S): 400 CAPSULE ORAL at 15:20

## 2025-07-25 RX ADMIN — Medication 2 TABLET(S): at 21:46

## 2025-07-25 RX ADMIN — Medication 100 MILLIGRAM(S): at 12:58

## 2025-07-25 RX ADMIN — Medication 200 MILLIGRAM(S): at 12:58

## 2025-07-25 RX ADMIN — POLYETHYLENE GLYCOL 3350 17 GRAM(S): 17 POWDER, FOR SOLUTION ORAL at 12:57

## 2025-07-25 RX ADMIN — Medication 667 MILLIGRAM(S): at 10:06

## 2025-07-25 RX ADMIN — Medication 75 MILLIGRAM(S): at 15:21

## 2025-07-25 RX ADMIN — GABAPENTIN 300 MILLIGRAM(S): 400 CAPSULE ORAL at 05:40

## 2025-07-25 RX ADMIN — ISOSORBDIE DINITRATE 10 MILLIGRAM(S): 30 TABLET ORAL at 05:41

## 2025-07-25 RX ADMIN — Medication 40 MILLIGRAM(S): at 05:41

## 2025-07-25 RX ADMIN — APIXABAN 5 MILLIGRAM(S): 5 TABLET, FILM COATED ORAL at 17:35

## 2025-07-25 RX ADMIN — ROSUVASTATIN CALCIUM 10 MILLIGRAM(S): 20 TABLET, FILM COATED ORAL at 21:46

## 2025-07-25 RX ADMIN — Medication 75 MILLIGRAM(S): at 05:41

## 2025-07-25 RX ADMIN — Medication 1 APPLICATION(S): at 12:59

## 2025-07-25 RX ADMIN — GABAPENTIN 300 MILLIGRAM(S): 400 CAPSULE ORAL at 21:45

## 2025-07-25 RX ADMIN — IPRATROPIUM BROMIDE AND ALBUTEROL SULFATE 3 MILLILITER(S): .5; 2.5 SOLUTION RESPIRATORY (INHALATION) at 12:57

## 2025-07-25 RX ADMIN — ISOSORBDIE DINITRATE 10 MILLIGRAM(S): 30 TABLET ORAL at 12:57

## 2025-07-25 RX ADMIN — CLOPIDOGREL BISULFATE 75 MILLIGRAM(S): 75 TABLET, FILM COATED ORAL at 12:58

## 2025-07-25 RX ADMIN — Medication 667 MILLIGRAM(S): at 17:36

## 2025-07-25 RX ADMIN — METOPROLOL SUCCINATE 37.5 MILLIGRAM(S): 50 TABLET, EXTENDED RELEASE ORAL at 17:36

## 2025-07-25 RX ADMIN — IPRATROPIUM BROMIDE AND ALBUTEROL SULFATE 3 MILLILITER(S): .5; 2.5 SOLUTION RESPIRATORY (INHALATION) at 05:40

## 2025-07-25 RX ADMIN — ISOSORBDIE DINITRATE 10 MILLIGRAM(S): 30 TABLET ORAL at 21:46

## 2025-07-26 ENCOUNTER — TRANSCRIPTION ENCOUNTER (OUTPATIENT)
Age: 85
End: 2025-07-26

## 2025-07-26 LAB
ANION GAP SERPL CALC-SCNC: 16 MMOL/L — SIGNIFICANT CHANGE UP (ref 5–17)
BUN SERPL-MCNC: 56 MG/DL — HIGH (ref 7–23)
CALCIUM SERPL-MCNC: 8.8 MG/DL — SIGNIFICANT CHANGE UP (ref 8.4–10.5)
CHLORIDE SERPL-SCNC: 101 MMOL/L — SIGNIFICANT CHANGE UP (ref 96–108)
CO2 SERPL-SCNC: 25 MMOL/L — SIGNIFICANT CHANGE UP (ref 22–31)
CREAT SERPL-MCNC: 3.1 MG/DL — HIGH (ref 0.5–1.3)
EGFR: 19 ML/MIN/1.73M2 — LOW
EGFR: 19 ML/MIN/1.73M2 — LOW
GLUCOSE SERPL-MCNC: 139 MG/DL — HIGH (ref 70–99)
HCT VFR BLD CALC: 24.2 % — LOW (ref 39–50)
HGB BLD-MCNC: 7.6 G/DL — LOW (ref 13–17)
MAGNESIUM SERPL-MCNC: 2.7 MG/DL — HIGH (ref 1.6–2.6)
MCHC RBC-ENTMCNC: 31.4 G/DL — LOW (ref 32–36)
MCHC RBC-ENTMCNC: 33.5 PG — SIGNIFICANT CHANGE UP (ref 27–34)
MCV RBC AUTO: 106.6 FL — HIGH (ref 80–100)
NRBC # BLD AUTO: 0 K/UL — SIGNIFICANT CHANGE UP (ref 0–0)
NRBC # FLD: 0 K/UL — SIGNIFICANT CHANGE UP (ref 0–0)
NRBC BLD AUTO-RTO: 0 /100 WBCS — SIGNIFICANT CHANGE UP (ref 0–0)
PHOSPHATE SERPL-MCNC: 5.3 MG/DL — HIGH (ref 2.5–4.5)
PLATELET # BLD AUTO: 160 K/UL — SIGNIFICANT CHANGE UP (ref 150–400)
PMV BLD: 9.9 FL — SIGNIFICANT CHANGE UP (ref 7–13)
POTASSIUM SERPL-MCNC: 4 MMOL/L — SIGNIFICANT CHANGE UP (ref 3.5–5.3)
POTASSIUM SERPL-SCNC: 4 MMOL/L — SIGNIFICANT CHANGE UP (ref 3.5–5.3)
RBC # BLD: 2.27 M/UL — LOW (ref 4.2–5.8)
RBC # FLD: 24.4 % — HIGH (ref 10.3–14.5)
SODIUM SERPL-SCNC: 142 MMOL/L — SIGNIFICANT CHANGE UP (ref 135–145)
WBC # BLD: 1.62 K/UL — LOW (ref 3.8–10.5)
WBC # FLD AUTO: 1.62 K/UL — LOW (ref 3.8–10.5)

## 2025-07-26 PROCEDURE — 99233 SBSQ HOSP IP/OBS HIGH 50: CPT | Mod: GC

## 2025-07-26 PROCEDURE — 74230 X-RAY XM SWLNG FUNCJ C+: CPT | Mod: 26

## 2025-07-26 RX ORDER — FLUCONAZOLE 150 MG
1 TABLET ORAL
Qty: 30 | Refills: 0
Start: 2025-07-26 | End: 2025-08-24

## 2025-07-26 RX ORDER — APIXABAN 5 MG/1
1 TABLET, FILM COATED ORAL
Qty: 60 | Refills: 0
Start: 2025-07-26 | End: 2025-08-24

## 2025-07-26 RX ORDER — METOPROLOL SUCCINATE 50 MG/1
1 TABLET, EXTENDED RELEASE ORAL
Qty: 60 | Refills: 0
Start: 2025-07-26 | End: 2025-08-24

## 2025-07-26 RX ORDER — FUROSEMIDE 10 MG/ML
1 INJECTION INTRAMUSCULAR; INTRAVENOUS
Qty: 30 | Refills: 0
Start: 2025-07-26 | End: 2025-08-24

## 2025-07-26 RX ORDER — FERROUS SULFATE 137(45) MG
1 TABLET, EXTENDED RELEASE ORAL
Qty: 30 | Refills: 0
Start: 2025-07-26 | End: 2025-08-24

## 2025-07-26 RX ADMIN — GABAPENTIN 300 MILLIGRAM(S): 400 CAPSULE ORAL at 06:07

## 2025-07-26 RX ADMIN — GABAPENTIN 300 MILLIGRAM(S): 400 CAPSULE ORAL at 14:15

## 2025-07-26 RX ADMIN — Medication 75 MILLIGRAM(S): at 14:16

## 2025-07-26 RX ADMIN — Medication 100 MILLIGRAM(S): at 14:16

## 2025-07-26 RX ADMIN — ROSUVASTATIN CALCIUM 10 MILLIGRAM(S): 20 TABLET, FILM COATED ORAL at 21:37

## 2025-07-26 RX ADMIN — Medication 75 MILLIGRAM(S): at 21:37

## 2025-07-26 RX ADMIN — Medication 40 MILLIGRAM(S): at 06:04

## 2025-07-26 RX ADMIN — ISOSORBDIE DINITRATE 10 MILLIGRAM(S): 30 TABLET ORAL at 12:56

## 2025-07-26 RX ADMIN — Medication 1 APPLICATION(S): at 12:53

## 2025-07-26 RX ADMIN — ISOSORBDIE DINITRATE 10 MILLIGRAM(S): 30 TABLET ORAL at 06:05

## 2025-07-26 RX ADMIN — Medication 200 MILLIGRAM(S): at 12:54

## 2025-07-26 RX ADMIN — ISOSORBDIE DINITRATE 10 MILLIGRAM(S): 30 TABLET ORAL at 16:56

## 2025-07-26 RX ADMIN — Medication 2 TABLET(S): at 21:38

## 2025-07-26 RX ADMIN — Medication 667 MILLIGRAM(S): at 12:52

## 2025-07-26 RX ADMIN — Medication 75 MILLIGRAM(S): at 06:04

## 2025-07-26 RX ADMIN — IPRATROPIUM BROMIDE AND ALBUTEROL SULFATE 3 MILLILITER(S): .5; 2.5 SOLUTION RESPIRATORY (INHALATION) at 06:04

## 2025-07-26 RX ADMIN — Medication 667 MILLIGRAM(S): at 17:40

## 2025-07-26 RX ADMIN — APIXABAN 5 MILLIGRAM(S): 5 TABLET, FILM COATED ORAL at 06:05

## 2025-07-26 RX ADMIN — IPRATROPIUM BROMIDE AND ALBUTEROL SULFATE 3 MILLILITER(S): .5; 2.5 SOLUTION RESPIRATORY (INHALATION) at 01:23

## 2025-07-26 RX ADMIN — IPRATROPIUM BROMIDE AND ALBUTEROL SULFATE 3 MILLILITER(S): .5; 2.5 SOLUTION RESPIRATORY (INHALATION) at 17:40

## 2025-07-26 RX ADMIN — CLOPIDOGREL BISULFATE 75 MILLIGRAM(S): 75 TABLET, FILM COATED ORAL at 12:53

## 2025-07-26 RX ADMIN — IPRATROPIUM BROMIDE AND ALBUTEROL SULFATE 3 MILLILITER(S): .5; 2.5 SOLUTION RESPIRATORY (INHALATION) at 12:54

## 2025-07-26 RX ADMIN — GABAPENTIN 300 MILLIGRAM(S): 400 CAPSULE ORAL at 21:37

## 2025-07-26 RX ADMIN — Medication 500 MILLIGRAM(S): at 12:53

## 2025-07-26 RX ADMIN — METOPROLOL SUCCINATE 37.5 MILLIGRAM(S): 50 TABLET, EXTENDED RELEASE ORAL at 17:41

## 2025-07-26 RX ADMIN — METOPROLOL SUCCINATE 37.5 MILLIGRAM(S): 50 TABLET, EXTENDED RELEASE ORAL at 06:05

## 2025-07-26 RX ADMIN — IPRATROPIUM BROMIDE AND ALBUTEROL SULFATE 3 MILLILITER(S): .5; 2.5 SOLUTION RESPIRATORY (INHALATION) at 23:05

## 2025-07-26 RX ADMIN — APIXABAN 5 MILLIGRAM(S): 5 TABLET, FILM COATED ORAL at 17:41

## 2025-07-26 RX ADMIN — Medication 667 MILLIGRAM(S): at 09:10

## 2025-07-26 RX ADMIN — FUROSEMIDE 80 MILLIGRAM(S): 10 INJECTION INTRAMUSCULAR; INTRAVENOUS at 06:05

## 2025-07-27 VITALS
OXYGEN SATURATION: 96 % | DIASTOLIC BLOOD PRESSURE: 58 MMHG | RESPIRATION RATE: 18 BRPM | HEART RATE: 69 BPM | SYSTOLIC BLOOD PRESSURE: 118 MMHG | TEMPERATURE: 98 F

## 2025-07-27 LAB
ALBUMIN SERPL ELPH-MCNC: 3.2 G/DL — LOW (ref 3.3–5)
ALP SERPL-CCNC: 102 U/L — SIGNIFICANT CHANGE UP (ref 40–120)
ALT FLD-CCNC: 34 U/L — SIGNIFICANT CHANGE UP (ref 10–45)
ANION GAP SERPL CALC-SCNC: 17 MMOL/L — SIGNIFICANT CHANGE UP (ref 5–17)
AST SERPL-CCNC: 39 U/L — SIGNIFICANT CHANGE UP (ref 10–40)
BILIRUB SERPL-MCNC: 0.5 MG/DL — SIGNIFICANT CHANGE UP (ref 0.2–1.2)
BUN SERPL-MCNC: 62 MG/DL — HIGH (ref 7–23)
CALCIUM SERPL-MCNC: 8.6 MG/DL — SIGNIFICANT CHANGE UP (ref 8.4–10.5)
CHLORIDE SERPL-SCNC: 103 MMOL/L — SIGNIFICANT CHANGE UP (ref 96–108)
CO2 SERPL-SCNC: 23 MMOL/L — SIGNIFICANT CHANGE UP (ref 22–31)
CREAT SERPL-MCNC: 3.27 MG/DL — HIGH (ref 0.5–1.3)
EGFR: 18 ML/MIN/1.73M2 — LOW
EGFR: 18 ML/MIN/1.73M2 — LOW
GLUCOSE SERPL-MCNC: 125 MG/DL — HIGH (ref 70–99)
MAGNESIUM SERPL-MCNC: 2.7 MG/DL — HIGH (ref 1.6–2.6)
PHOSPHATE SERPL-MCNC: 5.3 MG/DL — HIGH (ref 2.5–4.5)
POTASSIUM SERPL-MCNC: 4 MMOL/L — SIGNIFICANT CHANGE UP (ref 3.5–5.3)
POTASSIUM SERPL-SCNC: 4 MMOL/L — SIGNIFICANT CHANGE UP (ref 3.5–5.3)
PROT SERPL-MCNC: 6.2 G/DL — SIGNIFICANT CHANGE UP (ref 6–8.3)
SODIUM SERPL-SCNC: 143 MMOL/L — SIGNIFICANT CHANGE UP (ref 135–145)

## 2025-07-27 PROCEDURE — 87640 STAPH A DNA AMP PROBE: CPT

## 2025-07-27 PROCEDURE — 83605 ASSAY OF LACTIC ACID: CPT

## 2025-07-27 PROCEDURE — 82803 BLOOD GASES ANY COMBINATION: CPT

## 2025-07-27 PROCEDURE — 87641 MR-STAPH DNA AMP PROBE: CPT

## 2025-07-27 PROCEDURE — 86985 SPLIT BLOOD OR PRODUCTS: CPT

## 2025-07-27 PROCEDURE — 82553 CREATINE MB FRACTION: CPT

## 2025-07-27 PROCEDURE — 84484 ASSAY OF TROPONIN QUANT: CPT

## 2025-07-27 PROCEDURE — 86850 RBC ANTIBODY SCREEN: CPT

## 2025-07-27 PROCEDURE — 74230 X-RAY XM SWLNG FUNCJ C+: CPT

## 2025-07-27 PROCEDURE — 92611 MOTION FLUOROSCOPY/SWALLOW: CPT

## 2025-07-27 PROCEDURE — 93306 TTE W/DOPPLER COMPLETE: CPT

## 2025-07-27 PROCEDURE — 82435 ASSAY OF BLOOD CHLORIDE: CPT

## 2025-07-27 PROCEDURE — 85014 HEMATOCRIT: CPT

## 2025-07-27 PROCEDURE — 71045 X-RAY EXAM CHEST 1 VIEW: CPT

## 2025-07-27 PROCEDURE — 92610 EVALUATE SWALLOWING FUNCTION: CPT

## 2025-07-27 PROCEDURE — 97530 THERAPEUTIC ACTIVITIES: CPT

## 2025-07-27 PROCEDURE — 84295 ASSAY OF SERUM SODIUM: CPT

## 2025-07-27 PROCEDURE — 36600 WITHDRAWAL OF ARTERIAL BLOOD: CPT

## 2025-07-27 PROCEDURE — 85025 COMPLETE CBC W/AUTO DIFF WBC: CPT

## 2025-07-27 PROCEDURE — 80048 BASIC METABOLIC PNL TOTAL CA: CPT

## 2025-07-27 PROCEDURE — 86901 BLOOD TYPING SEROLOGIC RH(D): CPT

## 2025-07-27 PROCEDURE — 82728 ASSAY OF FERRITIN: CPT

## 2025-07-27 PROCEDURE — 87637 SARSCOV2&INF A&B&RSV AMP PRB: CPT

## 2025-07-27 PROCEDURE — 36415 COLL VENOUS BLD VENIPUNCTURE: CPT

## 2025-07-27 PROCEDURE — 82330 ASSAY OF CALCIUM: CPT

## 2025-07-27 PROCEDURE — P9011: CPT

## 2025-07-27 PROCEDURE — 85027 COMPLETE CBC AUTOMATED: CPT

## 2025-07-27 PROCEDURE — 83550 IRON BINDING TEST: CPT

## 2025-07-27 PROCEDURE — 85018 HEMOGLOBIN: CPT

## 2025-07-27 PROCEDURE — 86900 BLOOD TYPING SEROLOGIC ABO: CPT

## 2025-07-27 PROCEDURE — 94640 AIRWAY INHALATION TREATMENT: CPT

## 2025-07-27 PROCEDURE — 97162 PT EVAL MOD COMPLEX 30 MIN: CPT

## 2025-07-27 PROCEDURE — 84466 ASSAY OF TRANSFERRIN: CPT

## 2025-07-27 PROCEDURE — 85730 THROMBOPLASTIN TIME PARTIAL: CPT

## 2025-07-27 PROCEDURE — 93005 ELECTROCARDIOGRAM TRACING: CPT

## 2025-07-27 PROCEDURE — 86923 COMPATIBILITY TEST ELECTRIC: CPT

## 2025-07-27 PROCEDURE — 84132 ASSAY OF SERUM POTASSIUM: CPT

## 2025-07-27 PROCEDURE — 83540 ASSAY OF IRON: CPT

## 2025-07-27 PROCEDURE — 83735 ASSAY OF MAGNESIUM: CPT

## 2025-07-27 PROCEDURE — 36430 TRANSFUSION BLD/BLD COMPNT: CPT

## 2025-07-27 PROCEDURE — 85610 PROTHROMBIN TIME: CPT

## 2025-07-27 PROCEDURE — 99239 HOSP IP/OBS DSCHRG MGMT >30: CPT | Mod: GC

## 2025-07-27 PROCEDURE — 82947 ASSAY GLUCOSE BLOOD QUANT: CPT

## 2025-07-27 PROCEDURE — 80053 COMPREHEN METABOLIC PANEL: CPT

## 2025-07-27 PROCEDURE — 81001 URINALYSIS AUTO W/SCOPE: CPT

## 2025-07-27 PROCEDURE — 83880 ASSAY OF NATRIURETIC PEPTIDE: CPT

## 2025-07-27 PROCEDURE — 96374 THER/PROPH/DIAG INJ IV PUSH: CPT

## 2025-07-27 PROCEDURE — 84100 ASSAY OF PHOSPHORUS: CPT

## 2025-07-27 PROCEDURE — 71250 CT THORAX DX C-: CPT

## 2025-07-27 PROCEDURE — 87040 BLOOD CULTURE FOR BACTERIA: CPT

## 2025-07-27 PROCEDURE — 93970 EXTREMITY STUDY: CPT

## 2025-07-27 PROCEDURE — 99285 EMERGENCY DEPT VISIT HI MDM: CPT | Mod: 25

## 2025-07-27 PROCEDURE — 97110 THERAPEUTIC EXERCISES: CPT

## 2025-07-27 RX ORDER — ACETAMINOPHEN 500 MG/5ML
1000 LIQUID (ML) ORAL ONCE
Refills: 0 | Status: COMPLETED | OUTPATIENT
Start: 2025-07-27 | End: 2025-07-27

## 2025-07-27 RX ADMIN — Medication 75 MILLIGRAM(S): at 05:50

## 2025-07-27 RX ADMIN — Medication 40 MILLIGRAM(S): at 05:08

## 2025-07-27 RX ADMIN — ISOSORBDIE DINITRATE 10 MILLIGRAM(S): 30 TABLET ORAL at 11:27

## 2025-07-27 RX ADMIN — Medication 500 MILLIGRAM(S): at 11:27

## 2025-07-27 RX ADMIN — Medication 667 MILLIGRAM(S): at 13:20

## 2025-07-27 RX ADMIN — Medication 1000 MILLIGRAM(S): at 06:10

## 2025-07-27 RX ADMIN — Medication 325 MILLIGRAM(S): at 11:28

## 2025-07-27 RX ADMIN — ISOSORBDIE DINITRATE 10 MILLIGRAM(S): 30 TABLET ORAL at 05:08

## 2025-07-27 RX ADMIN — METOPROLOL SUCCINATE 37.5 MILLIGRAM(S): 50 TABLET, EXTENDED RELEASE ORAL at 05:07

## 2025-07-27 RX ADMIN — POLYETHYLENE GLYCOL 3350 17 GRAM(S): 17 POWDER, FOR SOLUTION ORAL at 11:27

## 2025-07-27 RX ADMIN — GABAPENTIN 300 MILLIGRAM(S): 400 CAPSULE ORAL at 14:50

## 2025-07-27 RX ADMIN — IPRATROPIUM BROMIDE AND ALBUTEROL SULFATE 3 MILLILITER(S): .5; 2.5 SOLUTION RESPIRATORY (INHALATION) at 05:08

## 2025-07-27 RX ADMIN — Medication 667 MILLIGRAM(S): at 08:34

## 2025-07-27 RX ADMIN — FUROSEMIDE 80 MILLIGRAM(S): 10 INJECTION INTRAMUSCULAR; INTRAVENOUS at 05:07

## 2025-07-27 RX ADMIN — GABAPENTIN 300 MILLIGRAM(S): 400 CAPSULE ORAL at 05:07

## 2025-07-27 RX ADMIN — CLOPIDOGREL BISULFATE 75 MILLIGRAM(S): 75 TABLET, FILM COATED ORAL at 11:27

## 2025-07-27 RX ADMIN — APIXABAN 5 MILLIGRAM(S): 5 TABLET, FILM COATED ORAL at 05:09

## 2025-07-27 RX ADMIN — Medication 400 MILLIGRAM(S): at 05:50

## 2025-07-27 RX ADMIN — IPRATROPIUM BROMIDE AND ALBUTEROL SULFATE 3 MILLILITER(S): .5; 2.5 SOLUTION RESPIRATORY (INHALATION) at 11:28

## 2025-07-27 RX ADMIN — Medication 1 APPLICATION(S): at 11:28

## 2025-07-27 RX ADMIN — Medication 200 MILLIGRAM(S): at 11:27

## 2025-07-27 RX ADMIN — Medication 100 MILLIGRAM(S): at 11:27

## 2025-07-27 RX ADMIN — Medication 75 MILLIGRAM(S): at 14:50

## 2025-07-28 ENCOUNTER — APPOINTMENT (OUTPATIENT)
Dept: PULMONOLOGY | Facility: CLINIC | Age: 85
End: 2025-07-28
Payer: MEDICARE

## 2025-07-28 DIAGNOSIS — I50.9 HEART FAILURE, UNSPECIFIED: ICD-10-CM

## 2025-07-28 PROCEDURE — 99205 OFFICE O/P NEW HI 60 MIN: CPT | Mod: 93

## 2025-07-28 RX ORDER — LEVOFLOXACIN 25 MG/ML
1 SOLUTION ORAL
Qty: 30 | Refills: 0
Start: 2025-07-28 | End: 2025-08-26

## 2025-07-31 ENCOUNTER — TRANSCRIPTION ENCOUNTER (OUTPATIENT)
Age: 85
End: 2025-07-31

## 2025-08-01 ENCOUNTER — APPOINTMENT (OUTPATIENT)
Dept: HEMATOLOGY ONCOLOGY | Facility: CLINIC | Age: 85
End: 2025-08-01
Payer: MEDICARE

## 2025-08-01 DIAGNOSIS — Z51.11 ENCOUNTER FOR ANTINEOPLASTIC CHEMOTHERAPY: ICD-10-CM

## 2025-08-01 DIAGNOSIS — I82.4Z2 ACUTE EMBOLISM AND THROMBOSIS OF UNSPECIFIED DEEP VEINS OF LEFT DISTAL LOWER EXTREMITY: ICD-10-CM

## 2025-08-01 DIAGNOSIS — D61.818 OTHER PANCYTOPENIA: ICD-10-CM

## 2025-08-01 DIAGNOSIS — D53.9 NUTRITIONAL ANEMIA, UNSPECIFIED: ICD-10-CM

## 2025-08-01 PROCEDURE — 99214 OFFICE O/P EST MOD 30 MIN: CPT | Mod: 2W

## 2025-08-01 PROCEDURE — G2211 COMPLEX E/M VISIT ADD ON: CPT | Mod: 2W

## 2025-08-06 ENCOUNTER — APPOINTMENT (OUTPATIENT)
Dept: INTERNAL MEDICINE | Facility: CLINIC | Age: 85
End: 2025-08-06

## 2025-08-06 VITALS
DIASTOLIC BLOOD PRESSURE: 64 MMHG | HEIGHT: 67 IN | WEIGHT: 180 LBS | OXYGEN SATURATION: 97 % | BODY MASS INDEX: 28.25 KG/M2 | SYSTOLIC BLOOD PRESSURE: 101 MMHG | HEART RATE: 60 BPM

## 2025-08-06 DIAGNOSIS — I48.91 UNSPECIFIED ATRIAL FIBRILLATION: ICD-10-CM

## 2025-08-06 DIAGNOSIS — D46.C MYELODYSPLASTIC SYNDROME WITH ISOLATED DEL(5Q) CHROMOSOMAL ABNORMALITY: ICD-10-CM

## 2025-08-06 DIAGNOSIS — C61 MALIGNANT NEOPLASM OF PROSTATE: ICD-10-CM

## 2025-08-06 DIAGNOSIS — J96.01 ACUTE RESPIRATORY FAILURE WITH HYPOXIA: ICD-10-CM

## 2025-08-06 DIAGNOSIS — R09.82 POSTNASAL DRIP: ICD-10-CM

## 2025-08-06 DIAGNOSIS — N18.32 CHRONIC KIDNEY DISEASE, STAGE 3B: ICD-10-CM

## 2025-08-06 PROCEDURE — G2211 COMPLEX E/M VISIT ADD ON: CPT

## 2025-08-06 PROCEDURE — 99214 OFFICE O/P EST MOD 30 MIN: CPT

## 2025-08-09 ENCOUNTER — RX RENEWAL (OUTPATIENT)
Age: 85
End: 2025-08-09

## 2025-08-11 ENCOUNTER — RESULT REVIEW (OUTPATIENT)
Age: 85
End: 2025-08-11

## 2025-08-11 ENCOUNTER — APPOINTMENT (OUTPATIENT)
Dept: HEMATOLOGY ONCOLOGY | Facility: CLINIC | Age: 85
End: 2025-08-11

## 2025-08-11 ENCOUNTER — APPOINTMENT (OUTPATIENT)
Dept: INFUSION THERAPY | Facility: HOSPITAL | Age: 85
End: 2025-08-11

## 2025-08-12 ENCOUNTER — APPOINTMENT (OUTPATIENT)
Dept: INFUSION THERAPY | Facility: HOSPITAL | Age: 85
End: 2025-08-12

## 2025-08-12 PROBLEM — R09.82 POST-NASAL DRIP: Status: ACTIVE | Noted: 2025-08-06

## 2025-08-12 PROBLEM — J96.01 ACUTE RESPIRATORY FAILURE WITH HYPOXIA: Status: ACTIVE | Noted: 2025-07-28

## 2025-08-13 ENCOUNTER — APPOINTMENT (OUTPATIENT)
Dept: INFUSION THERAPY | Facility: HOSPITAL | Age: 85
End: 2025-08-13

## 2025-08-14 ENCOUNTER — APPOINTMENT (OUTPATIENT)
Dept: INFUSION THERAPY | Facility: HOSPITAL | Age: 85
End: 2025-08-14

## 2025-08-14 ENCOUNTER — RESULT REVIEW (OUTPATIENT)
Age: 85
End: 2025-08-14

## 2025-08-14 ENCOUNTER — APPOINTMENT (OUTPATIENT)
Dept: HEMATOLOGY ONCOLOGY | Facility: CLINIC | Age: 85
End: 2025-08-14

## 2025-08-14 DIAGNOSIS — D64.9 ANEMIA, UNSPECIFIED: ICD-10-CM

## 2025-08-14 RX ORDER — FLUTICASONE PROPIONATE 50 UG/1
50 SPRAY NASAL TWICE DAILY
Qty: 1 | Refills: 3 | Status: ACTIVE | COMMUNITY
Start: 2025-08-06 | End: 1900-01-01

## 2025-08-15 ENCOUNTER — APPOINTMENT (OUTPATIENT)
Dept: INFUSION THERAPY | Facility: HOSPITAL | Age: 85
End: 2025-08-15

## 2025-08-27 ENCOUNTER — APPOINTMENT (OUTPATIENT)
Dept: CARDIOLOGY | Facility: CLINIC | Age: 85
End: 2025-08-27
Payer: MEDICARE

## 2025-08-27 VITALS
DIASTOLIC BLOOD PRESSURE: 69 MMHG | BODY MASS INDEX: 28.25 KG/M2 | SYSTOLIC BLOOD PRESSURE: 131 MMHG | HEIGHT: 67 IN | OXYGEN SATURATION: 98 % | HEART RATE: 66 BPM | WEIGHT: 180 LBS

## 2025-08-27 DIAGNOSIS — I10 ESSENTIAL (PRIMARY) HYPERTENSION: ICD-10-CM

## 2025-08-27 DIAGNOSIS — I50.9 HEART FAILURE, UNSPECIFIED: ICD-10-CM

## 2025-08-27 PROCEDURE — G2211 COMPLEX E/M VISIT ADD ON: CPT

## 2025-08-27 PROCEDURE — 99214 OFFICE O/P EST MOD 30 MIN: CPT

## 2025-08-28 ENCOUNTER — RX RENEWAL (OUTPATIENT)
Age: 85
End: 2025-08-28

## 2025-08-28 RX ORDER — LEVOFLOXACIN 250 MG/1
250 TABLET, FILM COATED ORAL
Qty: 30 | Refills: 0 | Status: ACTIVE | COMMUNITY
Start: 2025-08-28 | End: 1900-01-01

## 2025-08-29 ENCOUNTER — NON-APPOINTMENT (OUTPATIENT)
Age: 85
End: 2025-08-29

## 2025-08-29 ENCOUNTER — TRANSCRIPTION ENCOUNTER (OUTPATIENT)
Age: 85
End: 2025-08-29

## 2025-09-03 ENCOUNTER — RX RENEWAL (OUTPATIENT)
Age: 85
End: 2025-09-03

## 2025-09-04 ENCOUNTER — APPOINTMENT (OUTPATIENT)
Dept: CARDIOLOGY | Facility: CLINIC | Age: 85
End: 2025-09-04

## 2025-09-12 ENCOUNTER — TRANSCRIPTION ENCOUNTER (OUTPATIENT)
Age: 85
End: 2025-09-12

## 2025-09-12 ENCOUNTER — APPOINTMENT (OUTPATIENT)
Dept: HEMATOLOGY ONCOLOGY | Facility: CLINIC | Age: 85
End: 2025-09-12

## 2025-09-12 DIAGNOSIS — D64.9 ANEMIA, UNSPECIFIED: ICD-10-CM

## 2025-09-12 DIAGNOSIS — I82.4Z2 ACUTE EMBOLISM AND THROMBOSIS OF UNSPECIFIED DEEP VEINS OF LEFT DISTAL LOWER EXTREMITY: ICD-10-CM

## 2025-09-12 DIAGNOSIS — D46.C MYELODYSPLASTIC SYNDROME WITH ISOLATED DEL(5Q) CHROMOSOMAL ABNORMALITY: ICD-10-CM

## 2025-09-12 DIAGNOSIS — D61.818 OTHER PANCYTOPENIA: ICD-10-CM

## 2025-09-12 DIAGNOSIS — D53.9 NUTRITIONAL ANEMIA, UNSPECIFIED: ICD-10-CM

## 2025-09-12 PROCEDURE — G2211 COMPLEX E/M VISIT ADD ON: CPT | Mod: 93

## 2025-09-12 PROCEDURE — 99213 OFFICE O/P EST LOW 20 MIN: CPT | Mod: 93

## 2025-09-13 ENCOUNTER — LABORATORY RESULT (OUTPATIENT)
Age: 85
End: 2025-09-13

## 2025-09-16 ENCOUNTER — NON-APPOINTMENT (OUTPATIENT)
Age: 85
End: 2025-09-16

## 2025-09-17 ENCOUNTER — APPOINTMENT (OUTPATIENT)
Dept: INTERNAL MEDICINE | Facility: CLINIC | Age: 85
End: 2025-09-17